# Patient Record
Sex: FEMALE | Race: WHITE | NOT HISPANIC OR LATINO | Employment: OTHER | URBAN - METROPOLITAN AREA
[De-identification: names, ages, dates, MRNs, and addresses within clinical notes are randomized per-mention and may not be internally consistent; named-entity substitution may affect disease eponyms.]

---

## 2017-03-29 ENCOUNTER — ALLSCRIPTS OFFICE VISIT (OUTPATIENT)
Dept: OTHER | Facility: OTHER | Age: 72
End: 2017-03-29

## 2017-09-19 ENCOUNTER — ALLSCRIPTS OFFICE VISIT (OUTPATIENT)
Dept: OTHER | Facility: OTHER | Age: 72
End: 2017-09-19

## 2018-01-14 VITALS
HEIGHT: 58 IN | DIASTOLIC BLOOD PRESSURE: 68 MMHG | WEIGHT: 161 LBS | BODY MASS INDEX: 33.8 KG/M2 | SYSTOLIC BLOOD PRESSURE: 138 MMHG

## 2018-01-14 VITALS
WEIGHT: 160 LBS | BODY MASS INDEX: 33.58 KG/M2 | SYSTOLIC BLOOD PRESSURE: 192 MMHG | HEIGHT: 58 IN | DIASTOLIC BLOOD PRESSURE: 80 MMHG | HEART RATE: 60 BPM

## 2018-09-19 ENCOUNTER — OFFICE VISIT (OUTPATIENT)
Dept: NEUROLOGY | Facility: CLINIC | Age: 73
End: 2018-09-19
Payer: COMMERCIAL

## 2018-09-19 VITALS
HEART RATE: 60 BPM | HEIGHT: 58 IN | BODY MASS INDEX: 32.32 KG/M2 | DIASTOLIC BLOOD PRESSURE: 78 MMHG | SYSTOLIC BLOOD PRESSURE: 170 MMHG | WEIGHT: 154 LBS

## 2018-09-19 DIAGNOSIS — Z86.73 HISTORY OF CVA (CEREBROVASCULAR ACCIDENT): ICD-10-CM

## 2018-09-19 DIAGNOSIS — M48.061 SPINAL STENOSIS OF LUMBAR REGION WITHOUT NEUROGENIC CLAUDICATION: Primary | ICD-10-CM

## 2018-09-19 PROBLEM — M48.00 SPINAL STENOSIS: Status: ACTIVE | Noted: 2018-09-19

## 2018-09-19 PROCEDURE — 99214 OFFICE O/P EST MOD 30 MIN: CPT | Performed by: PSYCHIATRY & NEUROLOGY

## 2018-09-19 RX ORDER — LOSARTAN POTASSIUM 50 MG/1
1 TABLET ORAL DAILY
COMMUNITY
Start: 2018-08-20 | End: 2020-05-12

## 2018-09-19 RX ORDER — FERROUS SULFATE 325(65) MG
325 TABLET ORAL DAILY
COMMUNITY
End: 2020-01-22

## 2018-09-19 RX ORDER — AMLODIPINE BESYLATE 5 MG/1
5 TABLET ORAL 2 TIMES DAILY
COMMUNITY
Start: 2018-09-06 | End: 2019-10-10 | Stop reason: SDUPTHER

## 2018-09-19 RX ORDER — BIOTIN 1 MG
1 TABLET ORAL EVERY OTHER DAY
COMMUNITY
End: 2020-02-07

## 2018-09-19 RX ORDER — CLOPIDOGREL BISULFATE 75 MG/1
1 TABLET ORAL DAILY
COMMUNITY
End: 2019-06-10 | Stop reason: SDUPTHER

## 2018-09-19 RX ORDER — TRAMADOL HYDROCHLORIDE 50 MG/1
50 TABLET ORAL 2 TIMES DAILY PRN
Qty: 60 TABLET | Refills: 1 | Status: SHIPPED | OUTPATIENT
Start: 2018-09-19 | End: 2019-01-30 | Stop reason: ALTCHOICE

## 2018-09-19 RX ORDER — TRAMADOL HYDROCHLORIDE 50 MG/1
1 TABLET ORAL 2 TIMES DAILY PRN
COMMUNITY
End: 2018-09-19 | Stop reason: SDUPTHER

## 2018-09-19 RX ORDER — UREA 10 %
800 LOTION (ML) TOPICAL DAILY
COMMUNITY
End: 2020-01-22

## 2018-09-19 RX ORDER — PSEUDOEPHEDRINE/ACETAMINOPHEN 30MG-500MG
TABLET ORAL 2 TIMES DAILY
COMMUNITY
End: 2020-01-22

## 2018-09-19 RX ORDER — LABETALOL 300 MG/1
600 TABLET, FILM COATED ORAL 3 TIMES DAILY
COMMUNITY
Start: 2018-05-07 | End: 2019-01-22

## 2018-09-19 NOTE — PROGRESS NOTES
Progress Note - Neurology   Emilee Camacho 72 y o  female MRN: [id number]  Unit/Bed#:  Encounter: [id number]      Subjective:   Patient is here for a follow-up visit with a history of CVA, hypertension, hyperlipidemia, and chronic low back pain secondary to lumbar spinal stenosis  She was last seen by us over 6 months ago, and since her last visit she had 1 episode of severe pain in both lower extremities with swelling in both lower extremities with gradually resolved after she discontinued the use of amlodipine  Patient denies any low back pain at this time, and uses tramadol only on a p r n  basis last refill 6 months ago  Patient is looking for another primary physician since she was restarted back by her primary physician on Norvasc and is extremely uncomfortable  Her blood pressure remains uncontrolled at this time  She denies any new neurological symptoms  ROS:   Review of Systems   Constitutional: Negative for appetite change and fever  HENT: Negative  Negative for hearing loss, tinnitus, trouble swallowing and voice change  Eyes: Negative  Negative for photophobia and pain  Respiratory: Positive for shortness of breath  Cardiovascular: Negative  Negative for palpitations  Gastrointestinal: Negative  Negative for nausea and vomiting  Endocrine: Negative  Negative for cold intolerance and heat intolerance  Genitourinary: Positive for enuresis  Negative for dysuria, frequency and urgency  Musculoskeletal: Positive for gait problem  Negative for back pain, myalgias and neck pain  Skin: Negative  Negative for rash  Neurological: Positive for speech difficulty, weakness and headaches  Negative for dizziness, tremors, seizures, syncope, facial asymmetry, light-headedness and numbness  Hematological: Negative  Does not bruise/bleed easily  Psychiatric/Behavioral: Negative  Negative for confusion, decreased concentration, hallucinations and sleep disturbance         Vitals: Vitals:    09/19/18 1122   BP: 170/78   Pulse: 60   ,Body mass index is 32 75 kg/m²  MEDS:      Current Outpatient Prescriptions:     amLODIPine (NORVASC) 5 mg tablet, Take 5 mg by mouth 2 (two) times a day, Disp: , Rfl:     Cholecalciferol (VITAMIN D3) 2000 units capsule, Take 1 capsule by mouth every morning, Disp: , Rfl:     clopidogrel (PLAVIX) 75 mg tablet, Take 1 tablet by mouth daily, Disp: , Rfl:     ferrous sulfate 325 (65 Fe) mg tablet, Take 325 mg by mouth daily, Disp: , Rfl:     folic acid (FOLVITE) 1 mg tablet, Take 1 mg by mouth daily, Disp: , Rfl:     labetalol (NORMODYNE) 300 mg tablet, Take 600 mg by mouth 3 (three) times a day, Disp: , Rfl:     losartan (COZAAR) 50 mg tablet, Take 1 tablet by mouth 2 (two) times a day, Disp: , Rfl:     metFORMIN (GLUCOPHAGE) 500 mg tablet, Take 1 tablet by mouth every 12 (twelve) hours, Disp: , Rfl:     Omega-3 Fatty Acids (SM FISH OIL) 1200 MG CAPS, Take by mouth 2 (two) times a day, Disp: , Rfl:     traMADol (ULTRAM) 50 mg tablet, Take 1 tablet (50 mg total) by mouth 2 (two) times a day as needed for moderate pain, Disp: 60 tablet, Rfl: 1  :    Physical Exam:  General appearance: alert, appears stated age and cooperative  Head: Normocephalic, without obvious abnormality, atraumatic    Neurologic:  On examination she has no evidence of any new cranial nerve, motor or sensory deficits in the upper lower extremities, there is no evidence of any calf tenderness or swelling in the lower extremities at this time, no bruits were appreciable in the neck, and her gait is normal based  Patient has no evidence of any lumbosacral tenderness  Lab Results: I have personally reviewed pertinent reports  Imaging Studies: I have personally reviewed pertinent reports  Assessment:  1  Lumbar spinal stenosis,  2  History of CVA  3  Hypertension, hyperlipidemia and diabetes mellitus      Plan:  Patient is advised to continue Plavix 75 mg daily, she may continue to use tramadol on a p r n  basis, home exercise program is encouraged, and will also refer her to Internal Medicine for further evaluation and treatment of her hypertension at the patient's request       9/19/2018,12:55 PM    Dictation voice to text software has been used in the creation of this document  Please consider this in light of any contextual or grammatical errors

## 2019-01-22 ENCOUNTER — OFFICE VISIT (OUTPATIENT)
Dept: INTERNAL MEDICINE CLINIC | Facility: CLINIC | Age: 74
End: 2019-01-22
Payer: COMMERCIAL

## 2019-01-22 VITALS
HEIGHT: 58 IN | TEMPERATURE: 98 F | OXYGEN SATURATION: 98 % | RESPIRATION RATE: 18 BRPM | HEART RATE: 98 BPM | DIASTOLIC BLOOD PRESSURE: 82 MMHG | WEIGHT: 147.8 LBS | BODY MASS INDEX: 31.02 KG/M2 | SYSTOLIC BLOOD PRESSURE: 180 MMHG

## 2019-01-22 DIAGNOSIS — Z78.9 STATIN INTOLERANCE: Primary | ICD-10-CM

## 2019-01-22 DIAGNOSIS — F17.200 TOBACCO USE DISORDER: ICD-10-CM

## 2019-01-22 DIAGNOSIS — G45.0 VERTEBROBASILAR ARTERY SYNDROME: ICD-10-CM

## 2019-01-22 DIAGNOSIS — M1A.3720 CHRONIC GOUT DUE TO RENAL IMPAIRMENT INVOLVING TOE OF LEFT FOOT WITHOUT TOPHUS: ICD-10-CM

## 2019-01-22 DIAGNOSIS — M79.605 PAIN OF LEFT LOWER EXTREMITY: ICD-10-CM

## 2019-01-22 DIAGNOSIS — I73.9 PERIPHERAL VASCULAR DISEASE (HCC): ICD-10-CM

## 2019-01-22 DIAGNOSIS — Z86.73 HISTORY OF CVA (CEREBROVASCULAR ACCIDENT): ICD-10-CM

## 2019-01-22 DIAGNOSIS — IMO0001 UNCONTROLLED TYPE 2 DIABETES MELLITUS WITHOUT COMPLICATION, WITHOUT LONG-TERM CURRENT USE OF INSULIN: ICD-10-CM

## 2019-01-22 DIAGNOSIS — E11.69 HYPERLIPIDEMIA ASSOCIATED WITH TYPE 2 DIABETES MELLITUS (HCC): ICD-10-CM

## 2019-01-22 DIAGNOSIS — I83.812 VARICOSE VEINS OF LEFT LOWER EXTREMITY WITH PAIN: ICD-10-CM

## 2019-01-22 DIAGNOSIS — I10 HYPERTENSION, UNSPECIFIED TYPE: ICD-10-CM

## 2019-01-22 DIAGNOSIS — M48.061 SPINAL STENOSIS OF LUMBAR REGION WITHOUT NEUROGENIC CLAUDICATION: ICD-10-CM

## 2019-01-22 DIAGNOSIS — G62.9 POLYNEUROPATHY: ICD-10-CM

## 2019-01-22 DIAGNOSIS — M51.26 LUMBAR DISC HERNIATION: ICD-10-CM

## 2019-01-22 DIAGNOSIS — I70.1 RENAL ARTERY STENOSIS (HCC): ICD-10-CM

## 2019-01-22 DIAGNOSIS — E78.5 HYPERLIPIDEMIA ASSOCIATED WITH TYPE 2 DIABETES MELLITUS (HCC): ICD-10-CM

## 2019-01-22 DIAGNOSIS — E55.9 VITAMIN D DEFICIENCY: ICD-10-CM

## 2019-01-22 DIAGNOSIS — I65.1 BASILAR ARTERY STENOSIS: ICD-10-CM

## 2019-01-22 DIAGNOSIS — R94.31 ABNORMAL EKG: ICD-10-CM

## 2019-01-22 PROBLEM — N63.0 BREAST MASS: Status: ACTIVE | Noted: 2017-12-04

## 2019-01-22 PROBLEM — F41.8 DEPRESSION WITH ANXIETY: Status: ACTIVE | Noted: 2017-12-04

## 2019-01-22 PROBLEM — N18.30 CKD (CHRONIC KIDNEY DISEASE), STAGE III (HCC): Status: ACTIVE | Noted: 2017-12-04

## 2019-01-22 PROBLEM — E11.59 HYPERTENSION ASSOCIATED WITH DIABETES (HCC): Status: ACTIVE | Noted: 2017-12-04

## 2019-01-22 PROBLEM — K21.9 CHRONIC GERD: Status: ACTIVE | Noted: 2017-12-04

## 2019-01-22 PROBLEM — M19.90 OSTEOARTHRITIS: Status: ACTIVE | Noted: 2017-12-04

## 2019-01-22 PROBLEM — N80.9 ENDOMETRIOSIS: Status: ACTIVE | Noted: 2017-03-29

## 2019-01-22 PROBLEM — M10.9 GOUT: Status: ACTIVE | Noted: 2017-12-04

## 2019-01-22 PROBLEM — I15.2 HYPERTENSION ASSOCIATED WITH DIABETES (HCC): Status: ACTIVE | Noted: 2017-12-04

## 2019-01-22 PROBLEM — E66.9 OBESITY (BMI 30-39.9): Status: ACTIVE | Noted: 2017-12-04

## 2019-01-22 PROCEDURE — 93000 ELECTROCARDIOGRAM COMPLETE: CPT | Performed by: INTERNAL MEDICINE

## 2019-01-22 PROCEDURE — 99205 OFFICE O/P NEW HI 60 MIN: CPT | Performed by: INTERNAL MEDICINE

## 2019-01-22 RX ORDER — LOSARTAN POTASSIUM 100 MG/1
100 TABLET ORAL EVERY MORNING
COMMUNITY
End: 2020-05-12

## 2019-01-22 RX ORDER — CHLORTHALIDONE 25 MG/1
25 TABLET ORAL DAILY
Qty: 90 TABLET | Refills: 2 | Status: SHIPPED | OUTPATIENT
Start: 2019-01-22 | End: 2019-04-02

## 2019-01-22 NOTE — PROGRESS NOTES
Assessment/Plan:    1  Abnormal EKG is asymptomatic question of old MI on EKG will get echocardiogram as well as refer to Cardiology  2  Patient with history of peripheral vascular disease will recheck vas studies  3 long history of tobacco abuse strongly recommend tobacco cessation to decrease her risk of stroke as well as other vascular problems including myocardial infarction and death not interested at this time  4  Hypertension is not controlled will add chlorthalidone 25 mg once a day check labs in 1 week  5  Hyperlipidemia is statin intolerant   6  History of peripheral vascular disease will get arterial studies is having left lower extremity pain  7  Chronic kidney disease continued follow-up with her nephrologist Dr Rossy Jessica  8  Type 2 diabetes will be getting A1c and laboratory work in 1 week will stop metformin secondary to chronic kidney disease   9  Chronic kidney disease to cyst avoid all nonsteroidal anti-inflammatories such as Motrin Aleve or ibuprofen           Diagnoses and all orders for this visit:    Statin intolerance  -     CBC and differential; Future  -     Comprehensive metabolic panel; Future  -     Hemoglobin A1C; Future  -     TSH, 3rd generation with Free T4 reflex; Future  -     Vitamin D 25 hydroxy; Future  -     Uric acid; Future  -     Vitamin B12; Future  -     Protein electrophoresis, serum; Future    History of CVA (cerebrovascular accident)  -     Ambulatory referral to Internal Medicine  -     POCT ECG; Future  -     CBC and differential; Future  -     Comprehensive metabolic panel; Future  -     Hemoglobin A1C; Future  -     TSH, 3rd generation with Free T4 reflex; Future  -     Vitamin D 25 hydroxy; Future  -     Uric acid; Future  -     Vitamin B12; Future  -     Protein electrophoresis, serum; Future    Vitamin D deficiency  -     CBC and differential; Future  -     Comprehensive metabolic panel;  Future  -     Hemoglobin A1C; Future  -     TSH, 3rd generation with Free T4 reflex; Future  -     Vitamin D 25 hydroxy; Future  -     Uric acid; Future  -     Vitamin B12; Future  -     Protein electrophoresis, serum; Future    Vertebrobasilar artery syndrome  -     CBC and differential; Future  -     Comprehensive metabolic panel; Future  -     Hemoglobin A1C; Future  -     TSH, 3rd generation with Free T4 reflex; Future  -     Vitamin D 25 hydroxy; Future  -     Uric acid; Future  -     Vitamin B12; Future  -     Protein electrophoresis, serum; Future    Uncontrolled type 2 diabetes mellitus without complication, without long-term current use of insulin (HCC)  -     CBC and differential; Future  -     Comprehensive metabolic panel; Future  -     Hemoglobin A1C; Future  -     TSH, 3rd generation with Free T4 reflex; Future  -     Vitamin D 25 hydroxy; Future  -     Uric acid; Future  -     Vitamin B12; Future  -     Protein electrophoresis, serum; Future    Tobacco use disorder  -     CBC and differential; Future  -     Comprehensive metabolic panel; Future  -     Hemoglobin A1C; Future  -     TSH, 3rd generation with Free T4 reflex; Future  -     Vitamin D 25 hydroxy; Future  -     Uric acid; Future  -     Vitamin B12; Future  -     Protein electrophoresis, serum; Future  -     CT lung screening program; Future    Renal artery stenosis (HCC)  -     CBC and differential; Future  -     Comprehensive metabolic panel; Future  -     Hemoglobin A1C; Future  -     TSH, 3rd generation with Free T4 reflex; Future  -     Vitamin D 25 hydroxy; Future  -     Uric acid; Future  -     Vitamin B12; Future  -     Protein electrophoresis, serum; Future    Polyneuropathy  -     CBC and differential; Future  -     Comprehensive metabolic panel; Future  -     Hemoglobin A1C; Future  -     TSH, 3rd generation with Free T4 reflex; Future  -     Vitamin D 25 hydroxy; Future  -     Uric acid; Future  -     Vitamin B12; Future  -     Protein electrophoresis, serum;  Future    Hyperlipidemia associated with type 2 diabetes mellitus (HCC)  -     CBC and differential; Future  -     Comprehensive metabolic panel; Future  -     Hemoglobin A1C; Future  -     TSH, 3rd generation with Free T4 reflex; Future  -     Vitamin D 25 hydroxy; Future  -     Uric acid; Future  -     Vitamin B12; Future  -     Protein electrophoresis, serum; Future    Hypertension, unspecified type  -     POCT ECG; Future  -     CBC and differential; Future  -     Comprehensive metabolic panel; Future  -     Hemoglobin A1C; Future  -     TSH, 3rd generation with Free T4 reflex; Future  -     Vitamin D 25 hydroxy; Future  -     Uric acid; Future  -     Vitamin B12; Future  -     Protein electrophoresis, serum; Future  -     chlorthalidone 25 mg tablet; Take 1 tablet (25 mg total) by mouth daily    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  -     CBC and differential; Future  -     Comprehensive metabolic panel; Future  -     Hemoglobin A1C; Future  -     TSH, 3rd generation with Free T4 reflex; Future  -     Vitamin D 25 hydroxy; Future  -     Uric acid; Future  -     Vitamin B12; Future  -     Protein electrophoresis, serum; Future    Basilar artery stenosis  -     CBC and differential; Future  -     Comprehensive metabolic panel; Future  -     Hemoglobin A1C; Future  -     TSH, 3rd generation with Free T4 reflex; Future  -     Vitamin D 25 hydroxy; Future  -     Uric acid; Future  -     Vitamin B12; Future  -     Protein electrophoresis, serum; Future    Spinal stenosis of lumbar region without neurogenic claudication  -     CBC and differential; Future  -     Comprehensive metabolic panel; Future  -     Hemoglobin A1C; Future  -     TSH, 3rd generation with Free T4 reflex; Future  -     Vitamin D 25 hydroxy; Future  -     Uric acid; Future  -     Vitamin B12; Future  -     Protein electrophoresis, serum; Future  -     Ambulatory referral to Physical Therapy;  Future  -     XR spine lumbar minimum 4 views non injury; Future    Varicose veins of left lower extremity with pain  -     CBC and differential; Future  -     Comprehensive metabolic panel; Future  -     Hemoglobin A1C; Future  -     TSH, 3rd generation with Free T4 reflex; Future  -     Vitamin D 25 hydroxy; Future  -     Uric acid; Future  -     Vitamin B12; Future  -     Protein electrophoresis, serum; Future    Pain of left lower extremity  -     CBC and differential; Future  -     Comprehensive metabolic panel; Future  -     Hemoglobin A1C; Future  -     TSH, 3rd generation with Free T4 reflex; Future  -     Vitamin D 25 hydroxy; Future  -     Uric acid; Future  -     Vitamin B12; Future  -     Protein electrophoresis, serum; Future  -     Ambulatory referral to Physical Therapy; Future  -     XR femur 1 vw left; Future  -     XR spine lumbar minimum 4 views non injury; Future    Lumbar disc herniation  -     CBC and differential; Future  -     Comprehensive metabolic panel; Future  -     Hemoglobin A1C; Future  -     TSH, 3rd generation with Free T4 reflex; Future  -     Vitamin D 25 hydroxy; Future  -     Uric acid; Future  -     Vitamin B12; Future  -     Protein electrophoresis, serum; Future  -     Ambulatory referral to Physical Therapy; Future  -     XR spine lumbar minimum 4 views non injury; Future    Abnormal EKG    Other orders  -     losartan (COZAAR) 100 MG tablet; Take 100 mg by mouth daily  -     Cancel: XR femur 1 vw left; Future        The patient was counseled regarding instructions for management, risk factor reductions, patient and family education,impressions, risks and benefits of treatment options, side effects of medications, importance of compliance with treatment  The treatment plan was reviewed with the patient/guardian and patient/guardian understands and agrees with the treatment plan              Current Outpatient Prescriptions:     amLODIPine (NORVASC) 5 mg tablet, Take 5 mg by mouth 2 (two) times a day, Disp: , Rfl:    Cholecalciferol (VITAMIN D3) 2000 units capsule, Take 1 capsule by mouth every other day  , Disp: , Rfl:     clopidogrel (PLAVIX) 75 mg tablet, Take 1 tablet by mouth daily, Disp: , Rfl:     ferrous sulfate 325 (65 Fe) mg tablet, Take 325 mg by mouth daily, Disp: , Rfl:     folic acid (FOLVITE) 1 mg tablet, Take 1 mg by mouth daily, Disp: , Rfl:     losartan (COZAAR) 100 MG tablet, Take 100 mg by mouth daily, Disp: , Rfl:     losartan (COZAAR) 50 mg tablet, Take 1 tablet by mouth daily  , Disp: , Rfl:     metFORMIN (GLUCOPHAGE) 500 mg tablet, Take 1 tablet by mouth every 12 (twelve) hours, Disp: , Rfl:     Omega-3 Fatty Acids (SM FISH OIL) 1200 MG CAPS, Take by mouth 2 (two) times a day, Disp: , Rfl:     traMADol (ULTRAM) 50 mg tablet, Take 1 tablet (50 mg total) by mouth 2 (two) times a day as needed for moderate pain, Disp: 60 tablet, Rfl: 1    chlorthalidone 25 mg tablet, Take 1 tablet (25 mg total) by mouth daily, Disp: 90 tablet, Rfl: 2    Subjective:      Patient ID: Debbie Aguayo is a 72 y o  female  Left leg pain fell 2 weeks ago pain left leg has been having a difficult time controlling her blood pressure stop the labetalol about a month ago did not have any symptoms        The following portions of the patient's history were reviewed and updated as appropriate:   She has a past medical history of Arthritis; Coronary artery disease; Diabetes mellitus ([address] ); Endometriosis; High cholesterol; Hypertension; Kidney disease, chronic, stage III (moderate, EGFR 30-59 ml/min) ([address] ); Lumbar disc herniation; Peripheral vascular disease ([address] ); Shoulder injury; Spinal stenosis; and Stroke ([address] )  ,   does not have any pertinent problems on file  ,   has a past surgical history that includes Rotator cuff repair (Left) and Tonsillectomy  ,  family history includes Arthritis in her brother; Heart defect in her father; Heart disease in her mother; Hyperlipidemia in her mother; Hypertension in her father and mother; Other in her brother; Stroke in her sister  ,   reports that she has been smoking Cigarettes  She has been smoking about 1 00 pack per day  She has never used smokeless tobacco  She reports that she does not drink alcohol or use drugs  ,  is allergic to atorvastatin; colesevelam; colestipol; ezetimibe; fenofibrate; pollen extract; rosuvastatin; and statins       Review of Systems   Constitutional: Negative for appetite change, chills, fatigue, fever and unexpected weight change  HENT: Negative for congestion, ear pain, facial swelling, hearing loss, mouth sores, nosebleeds, postnasal drip, rhinorrhea, sinus pain, sore throat, trouble swallowing and voice change  Eyes: Negative for pain, discharge, redness and visual disturbance  Respiratory: Negative for apnea, chest tightness, shortness of breath, wheezing and stridor  Cardiovascular: Negative for chest pain, palpitations and leg swelling  Gastrointestinal: Negative for abdominal distention, abdominal pain, blood in stool, constipation, diarrhea and vomiting  Endocrine: Negative for cold intolerance, heat intolerance, polydipsia, polyphagia and polyuria  Genitourinary: Negative for difficulty urinating, dysuria, flank pain, frequency, genital sores, hematuria and urgency  Musculoskeletal: Positive for arthralgias, back pain and gait problem  Skin: Negative for rash and wound  Allergic/Immunologic: Negative for environmental allergies, food allergies and immunocompromised state  Neurological: Negative for dizziness, tremors, seizures, syncope, facial asymmetry, speech difficulty, weakness, light-headedness, numbness and headaches  Hematological: Negative for adenopathy  Does not bruise/bleed easily  Psychiatric/Behavioral: Negative for agitation, behavioral problems, dysphoric mood, hallucinations, self-injury, sleep disturbance and suicidal ideas  The patient is not hyperactive            Objective:  BP (!) 180/82 (BP Location: Left arm, Patient Position: Sitting)   Pulse 98   Temp 98 °F (36 7 °C) (Tympanic)   Resp 18   Ht 4' 10" (1 473 m)   Wt 67 kg (147 lb 12 8 oz)   LMP  (LMP Unknown)   SpO2 98%   BMI 30 89 kg/m²     Lab Review  No visits with results within 6 Month(s) from this visit  Latest known visit with results is:   No results found for any previous visit  15 1 14 0   42 8 42 5   Hemoglobin   Hematocrit    CHEMISTRY ROUTINE  Component Name    Orders Only on 12/3/2018  [facility]")' href="[email or web address]  [id number] 13 401 2 7 8 [id number]/">12/3/2018   Orders Only on 9/4/2018  [facility]")' href="[email or web address]  [id number] 1 07 [phone] 4 4 2 [id number]/">0/01/8217   Orders Only on 4/11/2018  [facility]")' href="[email or web address]  2 [id number] 1 13 401 2 7 8 [id number]/">11/16/2017       12 7     14 11     5 6 5 7       56 4   23 21     1 64 1 55     31 33     99 108     4 5 4 6     10  5       101       23       141       7 7       286 294   194 185   43 45   244 321   49       Lower Extremity Arterial Doppler Segmental Pressure  Examination           Indication: Claudication          High resolution bilateral lower extremity arterial Doppler wave forms and          pressures were obtained and evaluated  Upper extremity arterial pressures          were obtained for calculation of the ankle brachial index calculation (LISA)          The study was technically adequate  No prior study available for comparison           Right Leg: Biphasic common femoral, and monophasic femoral, popliteal, and          tibial signals were noted  The thigh pressure was 200 mmHg, the calf          pressure 150 mmHg, and the highest ankle brachial index was 0 74 at the          posterior tibial (140 mm Hg)  The dorsalis pedis index was 0 68 (130)          Left Leg: Monophasic signals were noted throughout the left lower extremity            The thigh pressure was 122 mm Hg, the calf pressure 122 mm Hg, the posterior          tibial artery 110 mm Hg with an index of 0 58, and dorsalis pedis 122 Mm Hg          with an index is 0 64           IMPRESSION:          1  Moderate right lower extremity arterial compromise, with likely          femoral-popliteal artery occlusive disease          2  Moderate left lower extremity arterial compromise, with likely iliofemoral          artery occlusive disease          The moderate degree of occlusive disease described above is consistent with          vasculogenic claudication  Clinical correlation is recommended    Imaging: No results found  XR femur 1 vw left    (Results Pending)   XR spine lumbar minimum 4 views non injury    (Results Pending)   CT lung screening program    (Results Pending)     No results found for this or any previous visit  Physical Exam   Constitutional: She is oriented to person, place, and time  She appears well-developed  Over weight   HENT:   Right Ear: External ear normal    Left Ear: External ear normal    Eyes: Right eye exhibits no discharge  Left eye exhibits no discharge  No scleral icterus  Neck: Carotid bruit is not present  No tracheal deviation present  No thyroid mass and no thyromegaly present  Cardiovascular: Normal rate, regular rhythm, normal heart sounds and intact distal pulses  Exam reveals no gallop and no friction rub  No murmur heard  Pulmonary/Chest: No respiratory distress  She has no wheezes  She has no rales  Musculoskeletal: She exhibits no edema  Lymphadenopathy:     She has no cervical adenopathy  Neurological: She is alert and oriented to person, place, and time  Coordination normal    Psychiatric: She has a normal mood and affect  Her behavior is normal  Judgment and thought content normal    Nursing note and vitals reviewed

## 2019-01-22 NOTE — LETTER
January 22, 2019     Wai Evans MD  [place] [address]    Patient: Laurel Givens   YOB: 1945   Date of Visit: 1/22/2019       Dear Dr Jeremy Otto:    Thank you for referring Yesenia Mills to me for evaluation  Below are my notes for this consultation  If you have questions, please do not hesitate to call me  I look forward to following your patient along with you  Sincerely,        Syl Pettit, DO        CC: MD Syl Ellis, DO  1/22/2019 10:39 AM  Sign at close encounter  Assessment/Plan:    1  Abnormal EKG is asymptomatic question of old MI on EKG will get echocardiogram as well as refer to Cardiology  2  Patient with history of peripheral vascular disease will recheck vas studies  3 long history of tobacco abuse strongly recommend tobacco cessation to decrease her risk of stroke as well as other vascular problems including myocardial infarction and death not interested at this time  4  Hypertension is not controlled will add chlorthalidone 25 mg once a day check labs in 1 week  5  Hyperlipidemia is statin intolerant   6  History of peripheral vascular disease will get arterial studies is having left lower extremity pain  7  Chronic kidney disease continued follow-up with her nephrologist Dr Zoila Kline  8  Type 2 diabetes will be getting A1c and laboratory work in 1 week will stop metformin secondary to chronic kidney disease   9  Chronic kidney disease to cyst avoid all nonsteroidal anti-inflammatories such as Motrin Aleve or ibuprofen           Diagnoses and all orders for this visit:    Statin intolerance  -     CBC and differential; Future  -     Comprehensive metabolic panel; Future  -     Hemoglobin A1C; Future  -     TSH, 3rd generation with Free T4 reflex; Future  -     Vitamin D 25 hydroxy; Future  -     Uric acid; Future  -     Vitamin B12; Future  -     Protein electrophoresis, serum;  Future    History of CVA (cerebrovascular accident)  -     Ambulatory referral to Internal Medicine  -     POCT ECG; Future  -     CBC and differential; Future  -     Comprehensive metabolic panel; Future  -     Hemoglobin A1C; Future  -     TSH, 3rd generation with Free T4 reflex; Future  -     Vitamin D 25 hydroxy; Future  -     Uric acid; Future  -     Vitamin B12; Future  -     Protein electrophoresis, serum; Future    Vitamin D deficiency  -     CBC and differential; Future  -     Comprehensive metabolic panel; Future  -     Hemoglobin A1C; Future  -     TSH, 3rd generation with Free T4 reflex; Future  -     Vitamin D 25 hydroxy; Future  -     Uric acid; Future  -     Vitamin B12; Future  -     Protein electrophoresis, serum; Future    Vertebrobasilar artery syndrome  -     CBC and differential; Future  -     Comprehensive metabolic panel; Future  -     Hemoglobin A1C; Future  -     TSH, 3rd generation with Free T4 reflex; Future  -     Vitamin D 25 hydroxy; Future  -     Uric acid; Future  -     Vitamin B12; Future  -     Protein electrophoresis, serum; Future    Uncontrolled type 2 diabetes mellitus without complication, without long-term current use of insulin (HCC)  -     CBC and differential; Future  -     Comprehensive metabolic panel; Future  -     Hemoglobin A1C; Future  -     TSH, 3rd generation with Free T4 reflex; Future  -     Vitamin D 25 hydroxy; Future  -     Uric acid; Future  -     Vitamin B12; Future  -     Protein electrophoresis, serum; Future    Tobacco use disorder  -     CBC and differential; Future  -     Comprehensive metabolic panel; Future  -     Hemoglobin A1C; Future  -     TSH, 3rd generation with Free T4 reflex; Future  -     Vitamin D 25 hydroxy; Future  -     Uric acid; Future  -     Vitamin B12; Future  -     Protein electrophoresis, serum; Future  -     CT lung screening program; Future    Renal artery stenosis (HCC)  -     CBC and differential; Future  -     Comprehensive metabolic panel;  Future  -     Hemoglobin A1C; Future  -     TSH, 3rd generation with Free T4 reflex; Future  -     Vitamin D 25 hydroxy; Future  -     Uric acid; Future  -     Vitamin B12; Future  -     Protein electrophoresis, serum; Future    Polyneuropathy  -     CBC and differential; Future  -     Comprehensive metabolic panel; Future  -     Hemoglobin A1C; Future  -     TSH, 3rd generation with Free T4 reflex; Future  -     Vitamin D 25 hydroxy; Future  -     Uric acid; Future  -     Vitamin B12; Future  -     Protein electrophoresis, serum; Future    Hyperlipidemia associated with type 2 diabetes mellitus (HCC)  -     CBC and differential; Future  -     Comprehensive metabolic panel; Future  -     Hemoglobin A1C; Future  -     TSH, 3rd generation with Free T4 reflex; Future  -     Vitamin D 25 hydroxy; Future  -     Uric acid; Future  -     Vitamin B12; Future  -     Protein electrophoresis, serum; Future    Hypertension, unspecified type  -     POCT ECG; Future  -     CBC and differential; Future  -     Comprehensive metabolic panel; Future  -     Hemoglobin A1C; Future  -     TSH, 3rd generation with Free T4 reflex; Future  -     Vitamin D 25 hydroxy; Future  -     Uric acid; Future  -     Vitamin B12; Future  -     Protein electrophoresis, serum; Future  -     chlorthalidone 25 mg tablet; Take 1 tablet (25 mg total) by mouth daily    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  -     CBC and differential; Future  -     Comprehensive metabolic panel; Future  -     Hemoglobin A1C; Future  -     TSH, 3rd generation with Free T4 reflex; Future  -     Vitamin D 25 hydroxy; Future  -     Uric acid; Future  -     Vitamin B12; Future  -     Protein electrophoresis, serum; Future    Basilar artery stenosis  -     CBC and differential; Future  -     Comprehensive metabolic panel; Future  -     Hemoglobin A1C; Future  -     TSH, 3rd generation with Free T4 reflex; Future  -     Vitamin D 25 hydroxy;  Future  -     Uric acid; Future  -     Vitamin B12; Future  -     Protein electrophoresis, serum; Future    Spinal stenosis of lumbar region without neurogenic claudication  -     CBC and differential; Future  -     Comprehensive metabolic panel; Future  -     Hemoglobin A1C; Future  -     TSH, 3rd generation with Free T4 reflex; Future  -     Vitamin D 25 hydroxy; Future  -     Uric acid; Future  -     Vitamin B12; Future  -     Protein electrophoresis, serum; Future  -     Ambulatory referral to Physical Therapy; Future  -     XR spine lumbar minimum 4 views non injury; Future    Varicose veins of left lower extremity with pain  -     CBC and differential; Future  -     Comprehensive metabolic panel; Future  -     Hemoglobin A1C; Future  -     TSH, 3rd generation with Free T4 reflex; Future  -     Vitamin D 25 hydroxy; Future  -     Uric acid; Future  -     Vitamin B12; Future  -     Protein electrophoresis, serum; Future    Pain of left lower extremity  -     CBC and differential; Future  -     Comprehensive metabolic panel; Future  -     Hemoglobin A1C; Future  -     TSH, 3rd generation with Free T4 reflex; Future  -     Vitamin D 25 hydroxy; Future  -     Uric acid; Future  -     Vitamin B12; Future  -     Protein electrophoresis, serum; Future  -     Ambulatory referral to Physical Therapy; Future  -     XR femur 1 vw left; Future  -     XR spine lumbar minimum 4 views non injury; Future    Lumbar disc herniation  -     CBC and differential; Future  -     Comprehensive metabolic panel; Future  -     Hemoglobin A1C; Future  -     TSH, 3rd generation with Free T4 reflex; Future  -     Vitamin D 25 hydroxy; Future  -     Uric acid; Future  -     Vitamin B12; Future  -     Protein electrophoresis, serum; Future  -     Ambulatory referral to Physical Therapy; Future  -     XR spine lumbar minimum 4 views non injury; Future    Abnormal EKG    Other orders  -     losartan (COZAAR) 100 MG tablet;  Take 100 mg by mouth daily  -     Cancel: XR femur 1 vw left; Future        The patient was counseled regarding instructions for management, risk factor reductions, patient and family education,impressions, risks and benefits of treatment options, side effects of medications, importance of compliance with treatment  The treatment plan was reviewed with the patient/guardian and patient/guardian understands and agrees with the treatment plan  Current Outpatient Prescriptions:     amLODIPine (NORVASC) 5 mg tablet, Take 5 mg by mouth 2 (two) times a day, Disp: , Rfl:     Cholecalciferol (VITAMIN D3) 2000 units capsule, Take 1 capsule by mouth every other day  , Disp: , Rfl:     clopidogrel (PLAVIX) 75 mg tablet, Take 1 tablet by mouth daily, Disp: , Rfl:     ferrous sulfate 325 (65 Fe) mg tablet, Take 325 mg by mouth daily, Disp: , Rfl:     folic acid (FOLVITE) 1 mg tablet, Take 1 mg by mouth daily, Disp: , Rfl:     losartan (COZAAR) 100 MG tablet, Take 100 mg by mouth daily, Disp: , Rfl:     losartan (COZAAR) 50 mg tablet, Take 1 tablet by mouth daily  , Disp: , Rfl:     metFORMIN (GLUCOPHAGE) 500 mg tablet, Take 1 tablet by mouth every 12 (twelve) hours, Disp: , Rfl:     Omega-3 Fatty Acids (SM FISH OIL) 1200 MG CAPS, Take by mouth 2 (two) times a day, Disp: , Rfl:     traMADol (ULTRAM) 50 mg tablet, Take 1 tablet (50 mg total) by mouth 2 (two) times a day as needed for moderate pain, Disp: 60 tablet, Rfl: 1    chlorthalidone 25 mg tablet, Take 1 tablet (25 mg total) by mouth daily, Disp: 90 tablet, Rfl: 2    Subjective:      Patient ID: Gregg Schmitz is a 72 y o  female      Left leg pain fell 2 weeks ago pain left leg has been having a difficult time controlling her blood pressure stop the labetalol about a month ago did not have any symptoms        The following portions of the patient's history were reviewed and updated as appropriate:   She has a past medical history of Arthritis; Coronary artery disease; Diabetes mellitus ([address] ); Endometriosis; High cholesterol; Hypertension; Kidney disease, chronic, stage III (moderate, EGFR 30-59 ml/min) ([facility] ); Lumbar disc herniation; Peripheral vascular disease ([facility] ); Shoulder injury; Spinal stenosis; and Stroke ([facility] )  ,   does not have any pertinent problems on file  ,   has a past surgical history that includes Rotator cuff repair (Left) and Tonsillectomy  ,  family history includes Arthritis in her brother; Heart defect in her father; Heart disease in her mother; Hyperlipidemia in her mother; Hypertension in her father and mother; Other in her brother; Stroke in her sister  ,   reports that she has been smoking Cigarettes  She has been smoking about 1 00 pack per day  She has never used smokeless tobacco  She reports that she does not drink alcohol or use drugs  ,  is allergic to atorvastatin; colesevelam; colestipol; ezetimibe; fenofibrate; pollen extract; rosuvastatin; and statins       Review of Systems   Constitutional: Negative for appetite change, chills, fatigue, fever and unexpected weight change  HENT: Negative for congestion, ear pain, facial swelling, hearing loss, mouth sores, nosebleeds, postnasal drip, rhinorrhea, sinus pain, sore throat, trouble swallowing and voice change  Eyes: Negative for pain, discharge, redness and visual disturbance  Respiratory: Negative for apnea, chest tightness, shortness of breath, wheezing and stridor  Cardiovascular: Negative for chest pain, palpitations and leg swelling  Gastrointestinal: Negative for abdominal distention, abdominal pain, blood in stool, constipation, diarrhea and vomiting  Endocrine: Negative for cold intolerance, heat intolerance, polydipsia, polyphagia and polyuria  Genitourinary: Negative for difficulty urinating, dysuria, flank pain, frequency, genital sores, hematuria and urgency  Musculoskeletal: Positive for arthralgias, back pain and gait problem  Skin: Negative for rash and wound     Allergic/Immunologic: Negative for environmental allergies, food allergies and immunocompromised state  Neurological: Negative for dizziness, tremors, seizures, syncope, facial asymmetry, speech difficulty, weakness, light-headedness, numbness and headaches  Hematological: Negative for adenopathy  Does not bruise/bleed easily  Psychiatric/Behavioral: Negative for agitation, behavioral problems, dysphoric mood, hallucinations, self-injury, sleep disturbance and suicidal ideas  The patient is not hyperactive  Objective:  BP (!) 180/82 (BP Location: Left arm, Patient Position: Sitting)   Pulse 98   Temp 98 °F (36 7 °C) (Tympanic)   Resp 18   Ht 4' 10" (1 473 m)   Wt 67 kg (147 lb 12 8 oz)   LMP  (LMP Unknown)   SpO2 98%   BMI 30 89 kg/m²      Lab Review  No visits with results within 6 Month(s) from this visit  Latest known visit with results is:   No results found for any previous visit  15 1 14 0   42 8 42 5   Hemoglobin   Hematocrit    CHEMISTRY ROUTINE  Component Name    Orders Only on 12/3/2018  [address] [place] [address]")' href="[email or web address]  [id number] 1 13 401 2 7 8 [id number]/">12/3/2018   Orders Only on 9/4/2018  [address] [place] [address]")' href="[email or web address]  [id number] [phone] 4 4 2 [id number]/">2/64/5549   Orders Only on 4/11/2018  [address] [place] [address]")' href="[email or web address]  2 [id number] 1 13 401 2 7 8 [id number]/">11/16/2017       12 7     14 11     5 6 5 7       56 4   23 21     1 64 1 55     31 33     99 108     4 5 4 6     10  5       101       23       141       7 7       286 294   194 185   43 45   244 321   49       Lower Extremity Arterial Doppler Segmental Pressure  Examination           Indication: Claudication          High resolution bilateral lower extremity arterial Doppler wave forms and          pressures were obtained and evaluated   Upper extremity arterial pressures          were obtained for calculation of the ankle brachial index calculation (LISA)          The study was technically adequate  No prior study available for comparison           Right Leg: Biphasic common femoral, and monophasic femoral, popliteal, and          tibial signals were noted  The thigh pressure was 200 mmHg, the calf          pressure 150 mmHg, and the highest ankle brachial index was 0 74 at the          posterior tibial (140 mm Hg)  The dorsalis pedis index was 0 68 (130)          Left Leg: Monophasic signals were noted throughout the left lower extremity          The thigh pressure was 122 mm Hg, the calf pressure 122 mm Hg, the posterior          tibial artery 110 mm Hg with an index of 0 58, and dorsalis pedis 122 Mm Hg          with an index is 0 64           IMPRESSION:          1  Moderate right lower extremity arterial compromise, with likely          femoral-popliteal artery occlusive disease          2  Moderate left lower extremity arterial compromise, with likely iliofemoral          artery occlusive disease          The moderate degree of occlusive disease described above is consistent with          vasculogenic claudication  Clinical correlation is recommended    Imaging: No results found  XR femur 1 vw left    (Results Pending)   XR spine lumbar minimum 4 views non injury    (Results Pending)   CT lung screening program    (Results Pending)     No results found for this or any previous visit  Physical Exam   Constitutional: She is oriented to person, place, and time  She appears well-developed  Over weight   HENT:   Right Ear: External ear normal    Left Ear: External ear normal    Eyes: Right eye exhibits no discharge  Left eye exhibits no discharge  No scleral icterus  Neck: Carotid bruit is not present  No tracheal deviation present  No thyroid mass and no thyromegaly present  Cardiovascular: Normal rate, regular rhythm, normal heart sounds and intact distal pulses  Exam reveals no gallop and no friction rub      No murmur heard   Pulmonary/Chest: No respiratory distress  She has no wheezes  She has no rales  Musculoskeletal: She exhibits no edema  Lymphadenopathy:     She has no cervical adenopathy  Neurological: She is alert and oriented to person, place, and time  Coordination normal    Psychiatric: She has a normal mood and affect  Her behavior is normal  Judgment and thought content normal    Nursing note and vitals reviewed

## 2019-01-23 ENCOUNTER — PATIENT OUTREACH (OUTPATIENT)
Dept: FAMILY MEDICINE CLINIC | Facility: CLINIC | Age: 74
End: 2019-01-23

## 2019-01-23 DIAGNOSIS — Z71.89 COMPLEX CARE COORDINATION: Primary | ICD-10-CM

## 2019-01-28 ENCOUNTER — HOSPITAL ENCOUNTER (OUTPATIENT)
Dept: RADIOLOGY | Facility: HOSPITAL | Age: 74
Discharge: HOME | End: 2019-01-28
Attending: INTERNAL MEDICINE
Payer: COMMERCIAL

## 2019-01-28 DIAGNOSIS — M79.605 PAIN OF LEFT LOWER EXTREMITY: ICD-10-CM

## 2019-01-28 DIAGNOSIS — M48.061 SPINAL STENOSIS OF LUMBAR REGION WITHOUT NEUROGENIC CLAUDICATION: ICD-10-CM

## 2019-01-28 DIAGNOSIS — M51.26 LUMBAR DISC HERNIATION: ICD-10-CM

## 2019-01-28 PROCEDURE — 72110 X-RAY EXAM L-2 SPINE 4/>VWS: CPT

## 2019-01-28 PROCEDURE — 73552 X-RAY EXAM OF FEMUR 2/>: CPT

## 2019-01-29 ENCOUNTER — TELEPHONE (OUTPATIENT)
Dept: INTERNAL MEDICINE CLINIC | Facility: CLINIC | Age: 74
End: 2019-01-29

## 2019-01-29 PROBLEM — M47.816 SPONDYLOSIS OF LUMBAR REGION WITHOUT MYELOPATHY OR RADICULOPATHY: Status: ACTIVE | Noted: 2019-01-29

## 2019-01-29 NOTE — TELEPHONE ENCOUNTER
Pt informed about femur x ray results as per Dr Ksenia Mccauley  If pain persists she is to call us

## 2019-01-30 ENCOUNTER — HOSPITAL ENCOUNTER (EMERGENCY)
Facility: HOSPITAL | Age: 74
Discharge: HOME | End: 2019-01-30
Attending: EMERGENCY MEDICINE
Payer: COMMERCIAL

## 2019-01-30 ENCOUNTER — PATIENT OUTREACH (OUTPATIENT)
Dept: FAMILY MEDICINE CLINIC | Facility: CLINIC | Age: 74
End: 2019-01-30

## 2019-01-30 ENCOUNTER — TELEPHONE (OUTPATIENT)
Dept: INTERNAL MEDICINE CLINIC | Facility: CLINIC | Age: 74
End: 2019-01-30

## 2019-01-30 ENCOUNTER — APPOINTMENT (EMERGENCY)
Dept: ULTRASOUND IMAGING | Facility: HOSPITAL | Age: 74
End: 2019-01-30
Payer: COMMERCIAL

## 2019-01-30 ENCOUNTER — APPOINTMENT (EMERGENCY)
Dept: RADIOLOGY | Facility: HOSPITAL | Age: 74
End: 2019-01-30
Payer: COMMERCIAL

## 2019-01-30 VITALS
DIASTOLIC BLOOD PRESSURE: 108 MMHG | TEMPERATURE: 98.5 F | HEART RATE: 99 BPM | HEIGHT: 58 IN | BODY MASS INDEX: 30.77 KG/M2 | SYSTOLIC BLOOD PRESSURE: 207 MMHG | RESPIRATION RATE: 18 BRPM | OXYGEN SATURATION: 97 % | WEIGHT: 146.61 LBS

## 2019-01-30 DIAGNOSIS — M48.061 SPINAL STENOSIS OF LUMBAR REGION: ICD-10-CM

## 2019-01-30 DIAGNOSIS — M79.605 LEFT LEG PAIN: Primary | ICD-10-CM

## 2019-01-30 DIAGNOSIS — I73.9 PERIPHERAL ARTERIAL DISEASE (HCC): ICD-10-CM

## 2019-01-30 PROCEDURE — 93926 LOWER EXTREMITY STUDY: CPT

## 2019-01-30 PROCEDURE — 73564 X-RAY EXAM KNEE 4 OR MORE: CPT

## 2019-01-30 PROCEDURE — 93971 EXTREMITY STUDY: CPT | Performed by: SURGERY

## 2019-01-30 PROCEDURE — 99284 EMERGENCY DEPT VISIT MOD MDM: CPT

## 2019-01-30 PROCEDURE — 93922 UPR/L XTREMITY ART 2 LEVELS: CPT | Performed by: SURGERY

## 2019-01-30 PROCEDURE — 93926 LOWER EXTREMITY STUDY: CPT | Performed by: SURGERY

## 2019-01-30 PROCEDURE — 93971 EXTREMITY STUDY: CPT

## 2019-01-30 RX ORDER — OXYCODONE HYDROCHLORIDE AND ACETAMINOPHEN 5; 325 MG/1; MG/1
1 TABLET ORAL EVERY 6 HOURS PRN
Qty: 16 TABLET | Refills: 0 | Status: SHIPPED | OUTPATIENT
Start: 2019-01-30 | End: 2019-02-02

## 2019-01-30 RX ORDER — OXYCODONE HYDROCHLORIDE AND ACETAMINOPHEN 5; 325 MG/1; MG/1
1 TABLET ORAL ONCE
Status: COMPLETED | OUTPATIENT
Start: 2019-01-30 | End: 2019-01-30

## 2019-01-30 RX ORDER — ONDANSETRON 4 MG/1
4 TABLET, ORALLY DISINTEGRATING ORAL ONCE
Status: COMPLETED | OUTPATIENT
Start: 2019-01-30 | End: 2019-01-30

## 2019-01-30 RX ADMIN — ONDANSETRON 4 MG: 4 TABLET, ORALLY DISINTEGRATING ORAL at 12:39

## 2019-01-30 RX ADMIN — OXYCODONE HYDROCHLORIDE AND ACETAMINOPHEN 1 TABLET: 5; 325 TABLET ORAL at 12:39

## 2019-01-30 NOTE — ED PROVIDER NOTES
History  Chief Complaint   Patient presents with    Leg Pain     pt states to have had fall 5 weeks ago and was c/o pain in left leg  pt states to have been to PCP and stated "PCP thinks its pinched nerve" pt came to our hospital yesterday for Xrays of leg that were negative  pt states "the pain is so bad i can't take it anymore"      72year old female with past medical history significant for hypertension, stroke, CKD3 and peripheral artery disease presents to ed at recommendation of her PCP with chief complaint of left knee and thigh pain  Onset: patient reports she fell 5 weeks ago while walking dog and injured left leg  She reports pain since then which has been getting worse  Location of symptoms reported as left thigh  Quality is reported as sharp burning pain  Severity is reported as severe, listed as 9/10 on the pain scale  Associated symptoms:  Denies paralysis or weakness of leg  Positive for thigh pain  Denies rash or fevers  Modifiers: walking, weight bearing, movement exacerbates pain  Context: patient reports she was seen by her PCP for symptoms - he felt is was a pinched nerve, but when symptoms persisted ordered an xray of her femur which was reportedly negative  She reports pain was getting worsen and PCP ordered outpatient vascular arterial studies but patient was in too much pain to have them done  She called them today and they recommended she come to ED for further evaluation due to history of known significant PAD and worsening pain symptoms  Reviewed past visits via EPIC  No pror visits to this ed  Femur xray results on 1/28/2019 were reviewed, no fracture  History provided by:  Patient and relative   used: No        Prior to Admission Medications   Prescriptions Last Dose Informant Patient Reported? Taking?    Cholecalciferol (VITAMIN D3) 2000 units capsule  Self Yes No   Sig: Take 1 capsule by mouth every other day     Omega-3 Fatty Acids (SM FISH OIL) 1200 MG CAPS   Yes No   Sig: Take by mouth 2 (two) times a day   amLODIPine (NORVASC) 5 mg tablet   Yes No   Sig: Take 5 mg by mouth 2 (two) times a day   chlorthalidone 25 mg tablet   No No   Sig: Take 1 tablet (25 mg total) by mouth daily   clopidogrel (PLAVIX) 75 mg tablet   Yes No   Sig: Take 1 tablet by mouth daily   ferrous sulfate 325 (65 Fe) mg tablet   Yes No   Sig: Take 325 mg by mouth daily   folic acid (FOLVITE) 1 mg tablet   Yes No   Sig: Take 1 mg by mouth daily   losartan (COZAAR) 100 MG tablet  Self Yes No   Sig: Take 100 mg by mouth daily   losartan (COZAAR) 50 mg tablet  Self Yes No   Sig: Take 1 tablet by mouth daily     traMADol (ULTRAM) 50 mg tablet   No No   Sig: Take 1 tablet (50 mg total) by mouth 2 (two) times a day as needed for moderate pain      Facility-Administered Medications: None       Past Medical History:   Diagnosis Date    Arthritis     Coronary artery disease     Diabetes mellitus (HCC)     Endometriosis     High cholesterol     Hypertension     Kidney disease, chronic, stage III (moderate, EGFR 30-59 ml/min) ([facility])     Lumbar disc herniation     Peripheral vascular disease (HCC)     Shoulder injury     left    Spinal stenosis     Stroke [facility])        Past Surgical History:   Procedure Laterality Date    ROTATOR CUFF REPAIR Left     TONSILLECTOMY         Family History   Problem Relation Age of Onset    Hypertension Mother     Heart disease Mother         Valvular    Hyperlipidemia Mother     Hypertension Father     Heart defect Father         Cardiomegaly    Stroke Sister         Cerebrovascular Accident    Arthritis Brother     Other Brother         Back Disorder     I have reviewed and agree with the history as documented      Social History   Substance Use Topics    Smoking status: Current Every Day Smoker     Packs/day: 1 00     Types: Cigarettes    Smokeless tobacco: Never Used    Alcohol use No        Review of Systems   Constitutional: Negative for activity change, appetite change, chills, diaphoresis, fatigue and fever  HENT: Negative for congestion, dental problem, drooling, ear discharge, ear pain, facial swelling, hearing loss, mouth sores, nosebleeds, postnasal drip, rhinorrhea, sinus pain, sinus pressure, sneezing, sore throat, tinnitus, trouble swallowing and voice change  Eyes: Negative for photophobia, pain, discharge, redness and itching  Respiratory: Negative for apnea, cough, choking, chest tightness, shortness of breath, wheezing and stridor  Cardiovascular: Negative for chest pain, palpitations and leg swelling  Gastrointestinal: Negative for abdominal distention, abdominal pain, anal bleeding, blood in stool, constipation, diarrhea, nausea, rectal pain and vomiting  Endocrine: Negative for cold intolerance, heat intolerance, polydipsia, polyphagia and polyuria  Genitourinary: Negative for decreased urine volume, difficulty urinating, dysuria, flank pain, frequency, hematuria and urgency  Musculoskeletal: Positive for arthralgias and gait problem  Negative for back pain, joint swelling, myalgias, neck pain and neck stiffness  Skin: Negative for color change, pallor, rash and wound  Allergic/Immunologic: Negative for environmental allergies, food allergies and immunocompromised state  Neurological: Negative for dizziness, tremors, seizures, syncope, facial asymmetry, speech difficulty, weakness, light-headedness, numbness and headaches  Hematological: Negative for adenopathy  Does not bruise/bleed easily  Psychiatric/Behavioral: Negative for agitation, confusion, decreased concentration and hallucinations  The patient is not nervous/anxious  All other systems reviewed and are negative  Physical Exam  Physical Exam   Constitutional: She is oriented to person, place, and time  She appears well-developed and well-nourished  No distress     BP (!) 207/108 (BP Location: Right arm)   Pulse 99   Temp 98 5 °F (36 9 °C) (Oral)   Resp 18   Ht 4' 10" (1 473 m)   Wt 66 5 kg (146 lb 9 7 oz)   LMP  (LMP Unknown)   SpO2 97%   BMI 30 64 kg/m²    HENT:   Head: Normocephalic and atraumatic  Right Ear: External ear normal    Left Ear: External ear normal    Nose: Nose normal    Mouth/Throat: Oropharynx is clear and moist  No oropharyngeal exudate  Eyes: Pupils are equal, round, and reactive to light  Conjunctivae and EOM are normal  Right eye exhibits no discharge  Left eye exhibits no discharge  No scleral icterus  Neck: Normal range of motion  Neck supple  No JVD present  No tracheal deviation present  No thyromegaly present  Cardiovascular: Normal rate, regular rhythm and intact distal pulses  Pulmonary/Chest: Effort normal and breath sounds normal  No respiratory distress  She has no wheezes  She has no rales  She exhibits no tenderness  Abdominal: Soft  Bowel sounds are normal  She exhibits no distension and no mass  There is no tenderness  There is no rebound and no guarding  No hernia  Musculoskeletal: She exhibits tenderness  She exhibits no edema or deformity  Left lower extremity - normal inspection, no gross deformity, there is tenderness to palpation distal aspect of anteromedial and anterolateral thigh  Palpation of these area exacerbates pain  Left hip is normal to inspection, nontender to palpation with FROM  Left ankle is normal to inspection, nontender to palpation with FROM  No palpable cords  Or posterior calf pain  Left knee is normal to inspection,  Medial and lateral joint lines nontender to palpation  No obvious effusion  Distal neurovascular intact to left foot  Posterior tibial pulse 1/4 present  Cap refill 3 seconds  All compartments of left lower leg soft, warm, perfused  Lymphadenopathy:     She has no cervical adenopathy  Neurological: She is alert and oriented to person, place, and time  She displays normal reflexes  No cranial nerve deficit or sensory deficit   She exhibits normal muscle tone  Coordination normal    Skin: Skin is warm and dry  Capillary refill takes less than 2 seconds  No rash noted  She is not diaphoretic  No erythema  No pallor  Psychiatric: She has a normal mood and affect  Her behavior is normal  Judgment and thought content normal    Nursing note and vitals reviewed  Vital Signs  ED Triage Vitals [01/30/19 1125]   Temperature Pulse Respirations Blood Pressure SpO2   98 5 °F (36 9 °C) 99 18 (!) 207/108 97 %      Temp Source Heart Rate Source Patient Position - Orthostatic VS BP Location FiO2 (%)   Oral Monitor Sitting Right arm --      Pain Score       5           Vitals:    01/30/19 1125   BP: (!) 207/108   Pulse: 99   Patient Position - Orthostatic VS: Sitting       Visual Acuity      ED Medications  Medications   oxyCODONE-acetaminophen (PERCOCET) 5-325 mg per tablet 1 tablet (1 tablet Oral Given 1/30/19 1239)   ondansetron (ZOFRAN-ODT) dispersible tablet 4 mg (4 mg Oral Given 1/30/19 1239)       Diagnostic Studies  Results Reviewed     None                 VAS lower limb venous duplex study, unilateral/limited   Final Result by Jose Crum DO (01/30 1433)      XR knee 4+ views left injury   Final Result by Ronal Hsu MD (01/30 1317)      No acute osseous abnormality  Workstation performed: [id number]         VAS lower limb arterial duplex, limited, unilateral    (Results Pending)              Procedures  Procedures       Phone Contacts  ED Phone Contact    ED Course  ED Course as of Jan 30 1513   Wed Jan 30, 2019   1456 Patient feels improved after analgesics given in ed  Able to ambulate to bathroom in ED without difficulty  Does use cane at home but would benefit from walker for stability                                   MDM  Number of Diagnoses or Management Options  Left leg pain: new and requires workup  Peripheral arterial disease ([facility] [address] ): new and requires workup  Spinal stenosis of lumbar region: new and requires workup  Diagnosis management comments: ddx includes but is not limited to: fracture, hematoma, strain, muscle spasm, lumbar radiculopathy, DVT, claudication, PAD, ischemic limb, OA, RA, plan workup including arterial and venous studies of leg, add xrays knee and reassess  Arterial Studies done at [facility] on 5/7/2014: High resolution bilateral lower extremity arterial Doppler wave forms and          pressures were obtained and evaluated  Upper extremity arterial pressures          were obtained for calculation of the ankle brachial index calculation (LISA)          The study was technically adequate  No prior study available for comparison           Right Leg: Biphasic common femoral, and monophasic femoral, popliteal, and          tibial signals were noted  The thigh pressure was 200 mmHg, the calf          pressure 150 mmHg, and the highest ankle brachial index was 0 74 at the          posterior tibial (140 mm Hg)  The dorsalis pedis index was 0 68 (130)          Left Leg: Monophasic signals were noted throughout the left lower extremity          The thigh pressure was 122 mm Hg, the calf pressure 122 mm Hg, the posterior          tibial artery 110 mm Hg with an index of 0 58, and dorsalis pedis 122 Mm Hg          with an index is 0 64           IMPRESSION:          1  Moderate right lower extremity arterial compromise, with likely          femoral-popliteal artery occlusive disease          2  Moderate left lower extremity arterial compromise, with likely iliofemoral          artery occlusive disease          The moderate degree of occlusive disease described above is consistent with          vasculogenic claudication  Clinical correlation is recommended    Patient's arterial studies consistent with peripheral arterial disease  Will xray knee to rule out alternate etiology of thigh pain  If unremarkable consider severe claudication/arterial disease as source of leg pain symptoms    Will get arterial studies of left leg for comparison  Will add dvt studies due to recent fall/trauma and patient reported significant bruising to left thigh just after injury  Considered CTA of left leg but patient with CKD 3 GFR 31  Arterial duplex left leg results reviewed: FINDINGS:     Left                   Impression      PSV  EDV    Dist EIA               50-75%          241    8    Common Femoral Artery                  111    8    Prox Profunda                           58    2    Dist  Profunda         Not visualized              Prox SFA               50-75%          205    4    Mid SFA                                 79    4    Dist SFA                                41    2    Proximal Pop                            30    3    Distal Pop                              31    1    Prox Post Tibial                        30    4    Dist Post Tibial                        17    4    Dist  Ant  Tibial                       15    3    Dist Peroneal                           41    3             CONCLUSION:  RIGHT LOWER LIMB:  Ankle/Brachial index: 0 8 (Moderate), prior 0 7 (5/7/2014 - outside facility)  Metatarsal pressure of 148 mmHg  Great toe pressure of 144 mmHg, within healing range  PVR/ PPG tracings are dampened      LEFT LOWER LIMB:  Monophasic waveforms are noted throughout entire left lower extremity  suggestive of iliofemoral artery occlusive disease  There is a focal 50-75%  stenosis noted in the distal external iliac artery and a 50-70% focal stenosis  noted in the proximal superficial femoral artery  Ankle/Brachial index: 0 55 (Severe), prior 0 58 (5/7/2014 - outside facility)  Metatarsal pressure of 143 mmHg  Great toe pressure of 150 mmHg, within healing range  PVR/ PPG tracings are dampened      Technical findings given to Bacilio Aguilar PA-C at 1400 1/30/2019    Compared to previous study on 5/7/2014 (outside facility, report accessed  through "care everywhere", there is no significant change in the disease  process  Recommend repeat testing in 6 months as per protocol unless otherwise  Indicated  US LLE negative for DVt  Discussed all test results with patient at bedside  Patient is significantly improved after analgesics given in ed  Will upgrade analgesics  Discontinue tramadol, start oxycodone  Standard narcotic precautions given  Will prescribe walker for further stability  Patient agrees with same  Instructed to follow up with vascular surgery for further evaluation of symptoms in 1-2 days  Follow up with Dr Burleson January in 1-2 days for recheck  Follow up with neurology in 3-5 days for further evaluation of spinal stenosis  I discussed with patient I suspect that patients symptoms are multifactorial with contributions from claudication/peripheral arterial disease in addition to spinal stenosis/radiculitis as well as osteoarthritis  Discussed will treat with analgesics,  She is scheduled to start physical therapy for spinal stenosis  Compared to prior arterial studies, there is no significant change in PAD  No ischemia on evaluation today  Discussed signs and symptoms to watch for regarding claudication/peripheral arterial disease  Patient feels improved and comfortable with discharge home  Reviewed reasons to return to ed  Patient verbalized understanding of diagnosis and agreement with discharge plan of care as well as understanding of reasons to return to ed  bp is elevated however in patient with significant PAD will allow due to concern for limb ischemia if pressures were lower                     Amount and/or Complexity of Data Reviewed  Tests in the radiology section of CPT®: ordered and reviewed  Tests in the medicine section of CPT®: ordered and reviewed  Discussion of test results with the performing providers: yes  Obtain history from someone other than the patient: yes (Family member at bedside  )  Review and summarize past medical records: yes  Independent visualization of images, tracings, or specimens: yes    Patient Progress  Patient progress: improved      Disposition  Final diagnoses:   Left leg pain   Peripheral arterial disease ([facility] [address] )   Spinal stenosis of lumbar region     Time reflects when diagnosis was documented in both MDM as applicable and the Disposition within this note     Time User Action Codes Description Comment    1/30/2019  2:57 PM Donnise Blue Add [S70 283] Left leg pain     1/30/2019  2:57 PM Donnise Blue Add [I73 9] Peripheral arterial disease ([facility] [address] )     1/30/2019  2:57 PM Donnise Blue Add [T15 352] Spinal stenosis of lumbar region       ED Disposition     ED Disposition Condition Date/Time Comment    Discharge  Wed Jan 30, 2019  2:58 PM Parul Mark discharge to home/self care  Condition at discharge: Stable        Follow-up Information     Follow up With Specialties Details Why Contact Info Additional Skylar 163, DO Internal Medicine Call in 1 day for further evaluation of symptoms [address]  [address]  [facility] 34 Emergency Department Emergency Medicine Go to If symptoms worsen 34 [facility] [address]  [phone] [place] ED, [address] [place], [place], [address] [facility], MD Vascular Surgery, Radiology Call in 1 day for further evaluation of symptoms [facility] [address]  [address] [facility] [address] [facility]  [phone]             Patient's Medications   Discharge Prescriptions    OXYCODONE-ACETAMINOPHEN (PERCOCET) 5-325 MG PER TABLET    Take 1 tablet by mouth every 6 (six) hours as needed for severe pain (leg pain/PAD/radiculitis/initial rx ) for up to 3 days Label no driving no etoh  Initial rx  Dx: Max Daily Amount: 4 tablets       Start Date: 1/30/2019 End Date: 2/2/2019       Order Dose: 1 tablet       Quantity: 16 tablet    Refills: 0     No discharge procedures on file      ED Provider  Electronically Signed by           Galina Massey PA-C  01/30/19 8527

## 2019-01-30 NOTE — DISCHARGE INSTRUCTIONS
Leg Pain   WHAT YOU NEED TO KNOW:   Leg pain may be caused by a variety of health conditions  Your tests did not show any broken bones or blood clots  DISCHARGE INSTRUCTIONS:   Return to the emergency department if:   · You have a fever  · Your leg starts to swell  · Your leg pain gets worse  · You have numbness or tingling in your leg or toes  · You cannot put any weight on or move your leg  Contact your healthcare provider if:   · Your pain does not decrease, even after treatment  · You have questions or concerns about your condition or care  Medicines:   · NSAIDs , such as ibuprofen, help decrease swelling, pain, and fever  This medicine is available with or without a doctor's order  NSAIDs can cause stomach bleeding or kidney problems in certain people  If you take blood thinner medicine, always ask your healthcare provider if NSAIDs are safe for you  Always read the medicine label and follow directions  · Take your medicine as directed  Contact your healthcare provider if you think your medicine is not helping or if you have side effects  Tell him of her if you are allergic to any medicine  Keep a list of the medicines, vitamins, and herbs you take  Include the amounts, and when and why you take them  Bring the list or the pill bottles to follow-up visits  Carry your medicine list with you in case of an emergency  Follow up with your healthcare provider as directed: You may need more tests to find the cause of your leg pain  You may need to see an orthopedic specialist or a physical therapist  Write down your questions so you remember to ask them during your visits  Manage your leg pain:   · Rest  your injured leg so that it can heal  You may need an immobilizer, brace, or splint to limit the movement of your leg  You may need to avoid putting any weight on your leg for at least 48 hours  Return to normal activities as directed      · Ice  the injury for 20 minutes every 4 hours for up to 24 hours, or as directed  Use an ice pack, or put crushed ice in a plastic bag  Cover it with a towel to protect your skin  Ice helps prevent tissue damage and decreases swelling and pain  · Elevate  your injured leg above the level of your heart as often as you can  This will help decrease swelling and pain  If possible, prop your leg on pillows or blankets to keep the area elevated comfortably  · Use assistive devices as directed  You may need to use a cane or crutches  Assistive devices help decrease pain and pressure on your leg when you walk  Ask your healthcare provider for more information about assistive devices and how to use them correctly  · Maintain a healthy weight  Extra body weight can cause pressure and pain in your hip, knee, and ankle joints  Ask your healthcare provider how much you should weigh  Ask him to help you create a weight loss plan if you are overweight  © 2017 [employer] Information is for End User's use only and may not be sold, redistributed or otherwise used for commercial purposes  All illustrations and images included in CareNotes® are the copyrighted property of [employer]  or [employer]  The above information is an  only  It is not intended as medical advice for individual conditions or treatments  Talk to your doctor, nurse or pharmacist before following any medical regimen to see if it is safe and effective for you  Peripheral Artery Disease   WHAT YOU NEED TO KNOW:   Peripheral artery disease (PAD) is narrow, weak, or blocked arteries  It may affect any arteries outside of your heart and brain  PAD is usually the result of a buildup of fat and cholesterol, also called plaque, along your artery walls  Inflammation, a blood clot, or abnormal cell growth could also block your arteries  PAD prevents normal blood flow to your legs and arms   You are at risk of an amputation if poor blood flow keeps wounds from healing or causes gangrene (tissue death)  Without treatment, PAD can also cause a heart attack or stroke  DISCHARGE INSTRUCTIONS:   Call 911 for the following:   · You have any of the following signs of a heart attack:      ¨ Squeezing, pressure, or pain in your chest that lasts longer than 5 minutes or returns    ¨ Discomfort or pain in your back, neck, jaw, stomach, or arm     ¨ Trouble breathing    ¨ Nausea or vomiting    ¨ Lightheadedness or a sudden cold sweat, especially with chest pain or trouble breathing    · You have any of the following signs of a stroke:      ¨ Numbness or drooping on one side of your face     ¨ Weakness in an arm or leg    ¨ Confusion or difficulty speaking    ¨ Dizziness, a severe headache, or vision loss  Seek care immediately if:   · You have sores or wounds that will not heal      · You notice black or discolored skin on your arm or leg  · Your skin is cool to the touch  Contact your healthcare provider if:   · You have leg pain when you walk 1/8 mile (200 meters) or less, even with treatment  · Your legs are red, dry, or pale, even with treatment  · You have questions or concerns about your condition or care  Manage PAD:   · Walk for 30 to 60 minutes at least 4 times a week  Your healthcare provider may also refer you to an supervised exercise program  The program helps increase how far you can walk without pain  It also helps you stay active in normal daily activities and may prevent disability caused by PAD  · Do not smoke  Nicotine and other chemicals in cigarettes and cigars can worsen PAD  They can also increase your risk for a heart attack or stroke  Ask your healthcare provider for information if you currently smoke and need help to quit  E-cigarettes or smokeless tobacco still contain nicotine  Talk to your healthcare provider before you use these products  · Manage other health conditions    Take your medicines as directed and follow your healthcare provider's instructions if you have high blood pressure or high cholesterol  Perform foot care and check your blood sugar levels as directed if you have diabetes  · Eat heart healthy foods  Eat whole grains, fruits, and vegetables every day  Limit salt and high-fat foods  Ask your healthcare provider for more information on a heart healthy diet  Ask if you need to lose weight  Your healthcare provider can help you create a healthy weight-loss plan  Medicines:   · Antiplatelet medicine,  such as aspirin, helps prevent blood clots and reduces the risk of a heart attack or stroke  · Statin medicine  helps lower your cholesterol and prevents your PAD from getting worse  · Take your medicine as directed  Contact your healthcare provider if you think your medicine is not helping or if you have side effects  Tell him or her if you are allergic to any medicine  Keep a list of the medicines, vitamins, and herbs you take  Include the amounts, and when and why you take them  Bring the list or the pill bottles to follow-up visits  Carry your medicine list with you in case of an emergency  Follow up with your healthcare provider as directed:  Write down your questions so you remember to ask them during your visits  © 2017 [address] [facility] Information is for End User's use only and may not be sold, redistributed or otherwise used for commercial purposes  All illustrations and images included in CareNotes® are the copyrighted property of [employer] M [employer]  or [employer]  The above information is an  only  It is not intended as medical advice for individual conditions or treatments  Talk to your doctor, nurse or pharmacist before following any medical regimen to see if it is safe and effective for you  Lumbar Spinal Stenosis   WHAT YOU NEED TO KNOW:   Lumbar spinal stenosis is narrowing of the spinal canal in your lower back   Your spinal canal holds your spinal cord  The spinal cord controls your ability to move  When your spinal canal narrows, it may put pressure on your spinal cord  DISCHARGE INSTRUCTIONS:   Return to the emergency department if:   · You have pain in your leg that does not go away or gets worse  · You have trouble moving your legs  · You cannot control when you urinate or have a bowel movement  Contact your healthcare provider if:   · You have new or worsening symptoms  · Your symptoms keep you from doing your daily activities  · You have questions or concerns about your condition or care  Medicines:   · NSAIDs , such as ibuprofen, help decrease swelling, pain, and fever  NSAIDs can cause stomach bleeding or kidney problems in certain people  If you take blood thinner medicine, always ask your healthcare provider if NSAIDs are safe for you  Always read the medicine label and follow directions  · Acetaminophen  decreases pain and fever  It is available without a doctor's order  Ask how much to take and how often to take it  Follow directions  Read the labels of all other medicines you are using to see if they also contain acetaminophen, or ask your doctor or pharmacist  Acetaminophen can cause liver damage if not taken correctly  Do not use more than 4 grams (4,000 milligrams) total of acetaminophen in one day  · Prescription pain medicine  may be given  Ask how to take this medicine safely  · Muscle relaxers  help decrease pain and muscle spasms  · Take your medicine as directed  Contact your healthcare provider if you think your medicine is not helping or if you have side effects  Tell him or her if you are allergic to any medicine  Keep a list of the medicines, vitamins, and herbs you take  Include the amounts, and when and why you take them  Bring the list or the pill bottles to follow-up visits  Carry your medicine list with you in case of an emergency  Self-care:   · Rest  when you feel it is needed   Slowly start to do more each day  Return to your daily activities as directed  · Apply heat on your back for 20 to 30 minutes every 2 hours for as many days as directed  Heat helps decrease pain and muscle spasms  · Apply ice  on your back for 15 to 20 minutes every hour or as directed  Use an ice pack, or put crushed ice in a plastic bag  Cover it with a towel before you apply it to your skin  Ice helps prevent tissue damage and decreases swelling and pain  Go to physical and occupational therapy as directed:  A physical therapist teaches you exercises to help improve movement and strength, and to decrease pain  An occupational therapist teaches you skills to help with your daily activities  Follow up with your healthcare provider as directed:  Write down your questions so you remember to ask them during your visits  © 2017 [employer] [employer] Information is for End User's use only and may not be sold, redistributed or otherwise used for commercial purposes  All illustrations and images included in CareNotes® are the copyrighted property of [employer]  or [employer]  The above information is an  only  It is not intended as medical advice for individual conditions or treatments  Talk to your doctor, nurse or pharmacist before following any medical regimen to see if it is safe and effective for you

## 2019-01-30 NOTE — TELEPHONE ENCOUNTER
Pt states her left leg is so painful she is unable to walk,stand on it or sleep  Pt states she was not able to have her U/S of the leg the patient advised to to to ER for eval and Tx, F/u with Dr Ridley after  Pt is happy with Plan  Dr Ridley aware

## 2019-02-04 ENCOUNTER — OFFICE VISIT (OUTPATIENT)
Dept: INTERNAL MEDICINE CLINIC | Facility: CLINIC | Age: 74
End: 2019-02-04
Payer: COMMERCIAL

## 2019-02-04 VITALS
SYSTOLIC BLOOD PRESSURE: 162 MMHG | DIASTOLIC BLOOD PRESSURE: 80 MMHG | HEIGHT: 58 IN | BODY MASS INDEX: 30.23 KG/M2 | RESPIRATION RATE: 18 BRPM | HEART RATE: 85 BPM | TEMPERATURE: 97.8 F | WEIGHT: 144 LBS | OXYGEN SATURATION: 98 %

## 2019-02-04 DIAGNOSIS — M51.26 LUMBAR DISC HERNIATION: Primary | ICD-10-CM

## 2019-02-04 DIAGNOSIS — E11.9 TYPE 2 DIABETES MELLITUS WITHOUT COMPLICATION, WITHOUT LONG-TERM CURRENT USE OF INSULIN (HCC): ICD-10-CM

## 2019-02-04 DIAGNOSIS — I10 HYPERTENSION, UNSPECIFIED TYPE: ICD-10-CM

## 2019-02-04 DIAGNOSIS — I73.9 CLAUDICATION IN PERIPHERAL VASCULAR DISEASE (HCC): ICD-10-CM

## 2019-02-04 DIAGNOSIS — M48.061 SPINAL STENOSIS OF LUMBAR REGION WITHOUT NEUROGENIC CLAUDICATION: ICD-10-CM

## 2019-02-04 PROCEDURE — 99214 OFFICE O/P EST MOD 30 MIN: CPT | Performed by: NURSE PRACTITIONER

## 2019-02-04 PROCEDURE — 3008F BODY MASS INDEX DOCD: CPT | Performed by: NURSE PRACTITIONER

## 2019-02-04 RX ORDER — PREDNISONE 10 MG/1
TABLET ORAL
Qty: 30 TABLET | Refills: 0 | Status: SHIPPED | OUTPATIENT
Start: 2019-02-04 | End: 2019-02-25 | Stop reason: ALTCHOICE

## 2019-02-04 NOTE — PROGRESS NOTES
Assessment/Plan:    1  Lumbar radiculopathy- Start Prednisone  This medication will cause your blood sugars to temporarily rise  2  Spinal stenosis- Will be following up with Dr Brant Beard next month  3  Peripheral artery disease- Please make sure you keep your appointment with Dr Karina Peterson  4  Hypertension- Not at goal  Just started Chlorthalidone, will be checking labs soon  Follow up with me as needed and with Dr Quirino Justice as scheduled next month  Diagnoses and all orders for this visit:    Lumbar disc herniation  -     predniSONE 10 mg tablet; Take 4 tabs by mouth x 3 days, then 3 tabs by mouth x 3 days, then 2 tabs by mouth x 3 days, then 1 tab by mouth x 3 days    Spinal stenosis of lumbar region without neurogenic claudication  -     predniSONE 10 mg tablet; Take 4 tabs by mouth x 3 days, then 3 tabs by mouth x 3 days, then 2 tabs by mouth x 3 days, then 1 tab by mouth x 3 days    Claudication in peripheral vascular disease (HCC)  -     predniSONE 10 mg tablet; Take 4 tabs by mouth x 3 days, then 3 tabs by mouth x 3 days, then 2 tabs by mouth x 3 days, then 1 tab by mouth x 3 days    Type 2 diabetes mellitus without complication, without long-term current use of insulin (HCC)    Hypertension, unspecified type    The patient was counseled regarding instructions for management, risk factor reductions, patient and family education,impressions, risks and benefits of treatment options, side effects of medications, importance of compliance with treatment  The treatment plan was reviewed with the patient/guardian and patient/guardian understands and agrees with the treatment plan              Current Outpatient Prescriptions:     amLODIPine (NORVASC) 5 mg tablet, Take 5 mg by mouth 2 (two) times a day, Disp: , Rfl:     chlorthalidone 25 mg tablet, Take 1 tablet (25 mg total) by mouth daily, Disp: 90 tablet, Rfl: 2    Cholecalciferol (VITAMIN D3) 2000 units capsule, Take 1 capsule by mouth every other day  , Disp: , Rfl:     clopidogrel (PLAVIX) 75 mg tablet, Take 1 tablet by mouth daily, Disp: , Rfl:     ferrous sulfate 325 (65 Fe) mg tablet, Take 325 mg by mouth daily, Disp: , Rfl:     folic acid (FOLVITE) 1 mg tablet, Take 1 mg by mouth daily, Disp: , Rfl:     losartan (COZAAR) 100 MG tablet, Take 100 mg by mouth daily, Disp: , Rfl:     losartan (COZAAR) 50 mg tablet, Take 1 tablet by mouth daily  , Disp: , Rfl:     Omega-3 Fatty Acids (SM FISH OIL) 1200 MG CAPS, Take by mouth 2 (two) times a day, Disp: , Rfl:     predniSONE 10 mg tablet, Take 4 tabs by mouth x 3 days, then 3 tabs by mouth x 3 days, then 2 tabs by mouth x 3 days, then 1 tab by mouth x 3 days, Disp: 30 tablet, Rfl: 0    Subjective:      Patient ID: Dhruv Leslie is a 72 y o  female  Recent ER visit for LLE burning sensation and difficulty walking  Knee XR was (-)  Vascular studies revealed no evidence of DVT and no change in PAD from previous studies  She was discharged on Oxycodone, which she takes sparingly, and her pain level is a 6 now  The following portions of the patient's history were reviewed and updated as appropriate:   She has a past medical history of Arthritis; Coronary artery disease; Diabetes mellitus ([address] ); Endometriosis; High cholesterol; Hypertension; Kidney disease, chronic, stage III (moderate, EGFR 30-59 ml/min) ([address] ); Lumbar disc herniation; Peripheral vascular disease ([address] ); Shoulder injury; Spinal stenosis; and Stroke ([facility] [address] )  ,   does not have any pertinent problems on file  ,   has a past surgical history that includes Rotator cuff repair (Left) and Tonsillectomy  ,  family history includes Arthritis in her brother; Heart defect in her father; Heart disease in her mother; Hyperlipidemia in her mother; Hypertension in her father and mother; Other in her brother; Stroke in her sister  ,   reports that she has been smoking Cigarettes  She has been smoking about 1 00 pack per day   She has never used smokeless tobacco  She reports that she does not drink alcohol or use drugs  ,  is allergic to atorvastatin; colesevelam; colestipol; ezetimibe; fenofibrate; pollen extract; rosuvastatin; and statins       Review of Systems   Constitutional: Negative  Respiratory: Negative  Cardiovascular: Negative  Musculoskeletal: Positive for gait problem  LLE and L hip pain   Psychiatric/Behavioral: Negative  Objective:  /80   Pulse 85   Temp 97 8 °F (36 6 °C) (Tympanic)   Resp 18   Ht 4' 10" (1 473 m)   Wt 65 3 kg (144 lb)   LMP  (LMP Unknown)   SpO2 98%   BMI 30 10 kg/m²     Lab Review  No visits with results within 2 Month(s) from this visit  Latest known visit with results is:   No results found for any previous visit  Imaging: Xr Spine Lumbar Minimum 4 Views Non Injury    Result Date: 1/29/2019  Narrative: LUMBAR SPINE INDICATION:   M48 061: Spinal stenosis, lumbar region without neurogenic claudication M79 605: Pain in left leg M51 26: Other intervertebral disc displacement, lumbar region  COMPARISON:  None VIEWS:  XR SPINE LUMBAR MINIMUM 4 VIEWS NON INJURY Images: 5 FINDINGS: There is S-shaped scoliosis of the tracker lumbar spine There is no evidence of acute fracture or destructive osseous lesion  Anterolisthesis of L4 over 5 seen Disc space narrowing seen at L4-5, L5-S1 Multilevel facet degenerative changes at L4-5, L5-S1 and L3-4 Mineralized vasculature seen The pedicles appear intact  Soft tissues are unremarkable  Impression: Degenerative disc disease with disc space narrowing at L4-5 and L5-S1 Degenerative anterolisthesis of L4 over 5 with the multilevel facet joint disease Mild retrolisthesis of L1 over L2 No acute compression collapse of the vertebra seen Workstation performed: [id number]     Xr Femur 2 Vw Left    Result Date: 1/29/2019  Narrative: LEFT FEMUR INDICATION:   M79 605: Pain in left leg   COMPARISON:  None VIEWS:  XR FEMUR 2 VW LEFT Images: 2 FINDINGS: There is no fracture or dislocation  Mild degenerative changes seen within the left hip joint  A linear lucency seen in the mid shaft of the femur with sharp margins suggest a nutrient channel No lytic or blastic lesions are seen  Mineralized vasculature seen     Impression: Mild degenerative changes in the left hip joint No acute displaced fracture seen Workstation performed: [id number]     Xr Knee 4+ Views Left Injury    Result Date: 1/30/2019  Narrative: LEFT KNEE INDICATION:   injury  COMPARISON:  None VIEWS:  XR KNEE 4+ VW LEFT INJURY FINDINGS: There is no acute fracture or dislocation  There is no joint effusion  No significant degenerative changes  No lytic or blastic lesions are seen  Soft tissues are unremarkable  Prominent vascular calcifications  Impression: No acute osseous abnormality  Workstation performed: [id number]     Vas Lower Limb Arterial Duplex, Limited, Unilateral    Result Date: 1/30/2019  Narrative:  [facility] REPORT CLINICAL: Indications:  Left Atherosclerosis with Rest Pain [I70 229]  The patient presents with increasing left upper leg/thigh pain x approximately five weeks  She has known arterial disease  Operative History: left rotator cuff pinning right ankle Risk Factors The patient has history of  HTN, HLD and PAD  She is a current smoker  Clinical Right Pressure: 207/ mm Hg, Left Pressure: IV    FINDINGS:  Left                   Impression      PSV  EDV  Dist EIA               50-75%          241    8  Common Femoral Artery                  111    8  Prox Profunda                           58    2  Dist  Profunda         Not visualized            Prox SFA               50-75%          205    4  Mid SFA                                 67    4  Dist SFA                                41    2  Proximal Pop                            30    3  Distal Pop                              31    1  Prox Post Tibial                        30    4  Dist Post Tibial                        17    4 Dist  Ant  Tibial                       15    3  Dist Peroneal                           41    3     CONCLUSION: Impression: RIGHT LOWER LIMB: Ankle/Brachial index: 0 8 (Moderate), prior 0 7 (5/7/2014 - outside facility)  Metatarsal pressure of 148 mmHg Great toe pressure of 144 mmHg, within healing range  PVR/ PPG tracings are dampened  LEFT LOWER LIMB: Monophasic waveforms are noted throughout entire left lower extremity suggestive of iliofemoral artery occlusive disease  There is a focal 50-75% stenosis noted in the distal external iliac artery and a 50-70% focal stenosis noted in the proximal superficial femoral artery  Ankle/Brachial index: 0 55 (Severe), prior 0 58 (5/7/2014 - outside facility)  Metatarsal pressure of 143 mmHg Great toe pressure of 150 mmHg, within healing range  PVR/ PPG tracings are dampened  Technical findings given to Pita Otero PA-C at 1400 1/30/2019  Compared to previous study on 5/7/2014 (outside facility, report accessed through "care everywhere", there is no significant change in the disease process  Recommend repeat testing in 6 months as per protocol unless otherwise indicated  SIGNATURE: Electronically Signed by: Angelique Thompson MD, RPVI on 2019-01-30 03:23:40 PM    Vas Lower Limb Venous Duplex Study, Unilateral/limited    Result Date: 1/30/2019  Narrative:  [facility] REPORT CLINICAL: Indications: Left Limb Pain [M79 609]  The patient presents with constant left upper leg pain x approximately five weeks (since falling)  She has no leg swelling  Operative History: left rotator cuff pinning right ankle Risk Factors The patient has history of HTN, HLD and PAD  She has no history of DVT  FINDINGS:  Segment  Right            Left              Impression       Impression       CFV      Normal (Patent)  Normal (Patent)     CONCLUSION:  Impression: RIGHT LOWER LIMB LIMITED: Evaluation shows no evidence of thrombus in the common femoral vein   Doppler evaluation shows a normal response to augmentation maneuvers  LEFT LOWER LIMB: No evidence of acute or chronic deep vein thrombosis No evidence of superficial thrombophlebitis noted  Doppler evaluation shows a normal response to augmentation maneuvers  Popliteal, posterior tibial and peroneal arterial Doppler waveforms are monophasic  Technical findings were given to Al Villatoro PA-C at 1400 1/30/2019  SIGNATURE: Electronically Signed by: Maribel Daniels on 2019-01-30 02:33:38 PM         Physical Exam   Constitutional: She is oriented to person, place, and time  She appears well-developed and well-nourished  Cardiovascular: Normal rate, regular rhythm, normal heart sounds and intact distal pulses  Pulmonary/Chest: Effort normal and breath sounds normal    Musculoskeletal:   Ambulates with cane   Neurological: She is alert and oriented to person, place, and time  She has normal reflexes  Psychiatric: She has a normal mood and affect   Her behavior is normal  Judgment and thought content normal

## 2019-02-04 NOTE — PATIENT INSTRUCTIONS
1  Lumbar radiculopathy- Start Prednisone  This medication will cause your blood sugars to temporarily rise  2  Spinal stenosis- Will be following up with Dr Ev Kathleen next month  3  Peripheral artery disease- Please make sure you keep your appointment with Dr Caroline Coombs  4  Hypertension- Not at goal  Just started Chlorthalidone, will be checking labs soon  Follow up with me as needed and with Dr Devan Vera as scheduled next month

## 2019-02-07 ENCOUNTER — PATIENT OUTREACH (OUTPATIENT)
Dept: INTERNAL MEDICINE CLINIC | Facility: CLINIC | Age: 74
End: 2019-02-07

## 2019-02-18 ENCOUNTER — PATIENT OUTREACH (OUTPATIENT)
Dept: INTERNAL MEDICINE CLINIC | Facility: CLINIC | Age: 74
End: 2019-02-18

## 2019-02-18 NOTE — PROGRESS NOTES
Attempted to contact patient's spouse home phone since patient's listed cell phone number is not in service  No answer on home phone  Will attempt again

## 2019-02-19 ENCOUNTER — EVALUATION (OUTPATIENT)
Dept: PHYSICAL THERAPY | Facility: CLINIC | Age: 74
End: 2019-02-19
Payer: COMMERCIAL

## 2019-02-19 DIAGNOSIS — M51.26 LUMBAR DISC HERNIATION: Primary | ICD-10-CM

## 2019-02-19 DIAGNOSIS — M79.605 PAIN OF LEFT LOWER EXTREMITY: ICD-10-CM

## 2019-02-19 DIAGNOSIS — M48.061 SPINAL STENOSIS OF LUMBAR REGION WITHOUT NEUROGENIC CLAUDICATION: ICD-10-CM

## 2019-02-19 PROCEDURE — 97162 PT EVAL MOD COMPLEX 30 MIN: CPT | Performed by: PHYSICAL THERAPIST

## 2019-02-19 PROCEDURE — 97110 THERAPEUTIC EXERCISES: CPT | Performed by: PHYSICAL THERAPIST

## 2019-02-19 NOTE — PROGRESS NOTES
PT Evaluation     Today's date: 2019  Patient name: Rach Fang  : [id number]  MRN: [id number]  Referring provider: Saroj Farley DO  Dx:   Encounter Diagnosis     ICD-10-CM    1  Lumbar disc herniation M51 26 Ambulatory referral to Physical Therapy   2  Spinal stenosis of lumbar region without neurogenic claudication M48 061 Ambulatory referral to Physical Therapy   3  Pain of left lower extremity M79 605 Ambulatory referral to Physical Therapy       Start Time: 1273  Stop Time: 1645  Total time in clinic (min): 70 minutes    Assessment  Assessment details: Pt is a 73 y/o female presenting to physical therapy with chief complaint of LBP after a fall at the beginning of January  She presents with apprehension to movement and gait, however this improved following session  Pt presented with a flexion bias, as repeated flexion improved her pain and her gait pattern whereas extension did not change her pain or her gait pattern  Manual correction of lateral shift attempted, although too painful for pt to tolerate  Pt would benefit from physical therapy in order to improve posture, improve AROM, improve strength, improve gait, and return to PLOF painfree  Impairments: abnormal gait, abnormal or restricted ROM, activity intolerance, impaired balance, impaired physical strength, lacks appropriate home exercise program, pain with function and poor posture   Functional limitations: ambulation, ADLs  Symptom irritability: highUnderstanding of Dx/Px/POC: good   Prognosis: good    Goals  ST weeks  1  Pt will demonstrate independence with HEP  2  Pt will improve lumbar AROM by 10%  3  Pt will improve BLE strength by at least 1/2 grade  4  Pt will report pain no more than 5/10  5  Pt will generate proper TA contraction without cuing to show improved NM control    LT weeks  1  Pt will improve lumbar AROM to at least 80% in all directions  2  Pt will report pain no more than 2/10  3   Pt will be able to stand/sit for at least 30 minutes without pain to return to PLOF  4  Pt will be able to lift at least 10lbs from floor to waist without pain  Plan  Patient would benefit from: skilled physical therapy  Planned modality interventions: cryotherapy and thermotherapy: hydrocollator packs  Planned therapy interventions: therapeutic exercise, therapeutic activities, stretching, strengthening, patient education, neuromuscular re-education, massage, manual therapy, balance, gait training and home exercise program  Frequency: 2x week  Duration in weeks: 4  Treatment plan discussed with: patient and family        Subjective Evaluation    History of Present Illness  Mechanism of injury: trauma  Mechanism of injury: Pt was walking her dog at the beginning of January and was pulled and fell forward into the bushes  Then about 2 days later she slipped on ice and fell on her back  Pt has had LBP for a while, but since she fell the front of the thigh is bothering her the most  She describes the pain as a burning pain  Pt is currently walking with a RW since her fall and because of her pain  Pt reports she cannot vacuum, dust, or any household chores  She took a tramadol which helped the pain  Pt reports she cannot sleep and has difficulty lying down             Not a recurrent problem   Quality of life: fair    Pain  Current pain ratin  At best pain ratin  At worst pain rating: 10  Quality: burning and radiating  Relieving factors: medications and heat  Aggravating factors: lifting, stair climbing, walking, standing and sitting  Progression: no change    Patient Goals  Patient goals for therapy: decreased pain, independence with ADLs/IADLs, return to sport/leisure activities and increased motion          Objective     Postural Observations  Seated posture: poor  Standing posture: poor    Additional Postural Observation Details  R lateral shift noted in standing    Active Range of Motion     Lumbar   Flexion: WFL  Extension:  with pain Restriction level: moderate  Left lateral flexion:  with pain Restriction level: moderate  Right lateral flexion:  with pain Restriction level: moderate    Additional Active Range of Motion Details  L SG painful    Passive Range of Motion     Lumbar   Left lateral flexion:  Restriction level: moderate  Right lateral flexion:  Restriction level: moderate    Strength/Myotome Testing     Left Hip   Planes of Motion   Flexion: 4    Right Hip   Planes of Motion   Flexion: 4+    Left Knee   Flexion: 4  Extension: 4    Right Knee   Flexion: 4+  Extension: 4+    Left Ankle/Foot   Dorsiflexion: 4    Right Ankle/Foot   Dorsiflexion: 4+    Ambulation     Observational Gait     Additional Observational Gait Details  Initially: pt ambulated with RW, forward flexed posture, and step to gait pattern with decreased stance time on the L  After session: pt ambulated with no AD, step through gait pattern although step/stride length remained limited, and upright posture      General Comments:      Lumbar Comments  No pain with DKTC manual    No pain with prone lying, increased with FAISAL  Hips shifted to the R with FAISAL, no increase in pain - press ups on elbows increased thigh pain    Femoral nerve tension (-)    Hip joint mobs painful    DKTC manual improved pain and gait - step/stride length  Seated lumbar flexion further improved gait      Flowsheet Rows      Most Recent Value   PT/OT G-Codes   Current Score  20   Projected Score  47   FOTO information reviewed  Yes          Precautions: HTN, hx stroke, DM, HLD    Daily Treatment Diary     Manual  2/19            Lateral shift correct 5'                                                                    Exercise Diary  2/19            Bike             DKTC with PB             SKTC             Seated PB rollouts             Seated lumbar flexion             Child's pose stretch             PPT             TA contraction             DLS ball press Modalities

## 2019-02-21 ENCOUNTER — APPOINTMENT (OUTPATIENT)
Dept: PHYSICAL THERAPY | Facility: CLINIC | Age: 74
End: 2019-02-21
Payer: COMMERCIAL

## 2019-02-25 ENCOUNTER — CONSULT (OUTPATIENT)
Dept: VASCULAR SURGERY | Facility: CLINIC | Age: 74
End: 2019-02-25
Payer: COMMERCIAL

## 2019-02-25 VITALS
WEIGHT: 152 LBS | HEART RATE: 84 BPM | SYSTOLIC BLOOD PRESSURE: 186 MMHG | RESPIRATION RATE: 20 BRPM | HEIGHT: 58 IN | BODY MASS INDEX: 31.91 KG/M2 | DIASTOLIC BLOOD PRESSURE: 78 MMHG

## 2019-02-25 DIAGNOSIS — E11.21 TYPE 2 DIABETES MELLITUS WITH DIABETIC NEPHROPATHY, WITHOUT LONG-TERM CURRENT USE OF INSULIN (HCC): Primary | ICD-10-CM

## 2019-02-25 DIAGNOSIS — I70.0 AORTOILIAC STENOSIS, LEFT (HCC): ICD-10-CM

## 2019-02-25 DIAGNOSIS — N18.30 CKD (CHRONIC KIDNEY DISEASE), STAGE III (HCC): ICD-10-CM

## 2019-02-25 DIAGNOSIS — I73.9 CLAUDICATION IN PERIPHERAL VASCULAR DISEASE (HCC): ICD-10-CM

## 2019-02-25 DIAGNOSIS — I63.9 CEREBROVASCULAR ACCIDENT (CVA), UNSPECIFIED MECHANISM (HCC): Primary | ICD-10-CM

## 2019-02-25 PROCEDURE — 3066F NEPHROPATHY DOC TX: CPT | Performed by: SURGERY

## 2019-02-25 PROCEDURE — 99203 OFFICE O/P NEW LOW 30 MIN: CPT | Performed by: SURGERY

## 2019-02-25 RX ORDER — TRAMADOL HYDROCHLORIDE 50 MG/1
50 TABLET ORAL EVERY 6 HOURS PRN
COMMUNITY
End: 2019-03-20 | Stop reason: SDUPTHER

## 2019-02-25 NOTE — LETTER
February 25, 2019     Nathan Irby DO  [id number] [address]    Patient: Jass Painter   YOB: 1945   Date of Visit: 2/25/2019       Dear Dr Darwin Adorno: Thank you for referring Mike Roldan to me for evaluation  Below are my notes for this consultation  If you have questions, please do not hesitate to call me  I look forward to following your patient along with you  Sincerely,        Jeffy Augustin MD        CC: MD Jeffy Anglin Cha, MD  2/25/2019 10:41 PM  Sign at close encounter  Assessment/Plan:    CKD (chronic kidney disease), stage III ([facility] )  As symptoms are improving with PT and there is significant CKD, I will recommend to hold of any intervention at this time  Claudication in peripheral vascular disease [facility] )  Left thigh claudicaiton due to long standing iliac stenosis  I reviewed recent LEAD and compared to LISA performed at [facility] in 2014  The LISA is unchanged from prior at 0 56  She has significant improvement in symptoms after PT  She will continued aspirin  F/U in 3 months     Repeat LEAD and AOIL in 6 months  Diagnoses and all orders for this visit:    Cerebrovascular accident (CVA), unspecified mechanism ([facility] )  -     VAS carotid complete study; Future    CKD (chronic kidney disease), stage III ([facility])    Claudication in peripheral vascular disease ([facility])    Aortoiliac stenosis, left ([facility])  -     VAS abdominal aorta/iliacs; complete study; Future    Other orders  -     traMADol (ULTRAM) 50 mg tablet; Take 50 mg by mouth every 6 (six) hours as needed for moderate pain          Subjective:      Patient ID: Jass Painter is a 72 y o  female  Long standing left thigh claudication  She was having testing every 6 months but the numbers were stable  Left thigh pain more with walking short distances  It is burning in nature  Initially it was all the time after the fall   she could barely walk after the fall when she took her dog for a walk few weeks ago  She had gone to ED at that time  But over last couple of weeks it has improved  She has also started PT with improvement in symptoms  She continues to smoke 1 pack per day or more  She takes plavix daily  The following portions of the patient's history were reviewed and updated as appropriate: allergies, current medications, past family history, past medical history, past social history, past surgical history and problem list     Review of Systems   Constitutional: Negative  HENT: Negative  Eyes: Negative  Respiratory: Negative  Cardiovascular: Negative  Negative for leg swelling  Gastrointestinal: Negative  Endocrine: Negative  Genitourinary: Negative  Musculoskeletal: Positive for gait problem  Leg pain   Skin: Negative  Allergic/Immunologic: Negative  Hematological: Negative  Psychiatric/Behavioral: Negative  I have reviewed the ROS as entered and made changes as necessary  Objective:      BP (!) 186/78 (BP Location: Left arm, Patient Position: Sitting)   Pulse 84   Resp 20   Ht 4' 10" (1 473 m)   Wt 68 9 kg (152 lb)   LMP  (LMP Unknown)   BMI 31 77 kg/m²           Physical Exam   Constitutional: She is oriented to person, place, and time  She appears well-developed and well-nourished  No distress  obese   HENT:   Head: Normocephalic and atraumatic  Right Ear: External ear normal    Left Ear: External ear normal    Nose: Nose normal    Mouth/Throat: No oropharyngeal exudate  Eyes: Pupils are equal, round, and reactive to light  Conjunctivae are normal  Right eye exhibits no discharge  Left eye exhibits no discharge  No scleral icterus  Neck: Normal range of motion  Neck supple  No tracheal deviation present  No thyromegaly present  Cardiovascular: Normal rate, regular rhythm, S1 normal and S2 normal  Exam reveals no gallop and no friction rub  Murmur heard    Pulses:       Femoral pulses are 2+ on the right side, and 1+ on the left side  No palp distal pulses  But she has biphasic PT signals daily  Pulmonary/Chest: Effort normal and breath sounds normal  No respiratory distress  She has no wheezes  She has no rales  Abdominal: Soft  Bowel sounds are normal  She exhibits no distension, no abdominal bruit and no pulsatile midline mass  There is no tenderness  Musculoskeletal: Normal range of motion  She exhibits tenderness (left thigh)  She exhibits no edema or deformity  Neurological: She is alert and oriented to person, place, and time  Skin: Skin is warm and dry  She is not diaphoretic  Extensive bilateral varicosities scattered along bilateral legs  Psychiatric: She has a normal mood and affect  Her behavior is normal    Nursing note and vitals reviewed

## 2019-02-25 NOTE — PROGRESS NOTES
Assessment/Plan:    CKD (chronic kidney disease), stage III ([facility])  As symptoms are improving with PT and there is significant CKD, I will recommend to hold of any intervention at this time  Claudication in peripheral vascular disease ([facility] )  Left thigh claudicaiton due to long standing iliac stenosis  I reviewed recent LEAD and compared to LISA performed at [facility] in 2014  The LISA is unchanged from prior at 0 56  She has significant improvement in symptoms after PT  She will continued aspirin  F/U in 3 months     Repeat LEAD and AOIL in 6 months  Diagnoses and all orders for this visit:    Cerebrovascular accident (CVA), unspecified mechanism ([facility] )  -     VAS carotid complete study; Future    CKD (chronic kidney disease), stage III ([facility])    Claudication in peripheral vascular disease ([facility])    Aortoiliac stenosis, left ([facility])  -     VAS abdominal aorta/iliacs; complete study; Future    Other orders  -     traMADol (ULTRAM) 50 mg tablet; Take 50 mg by mouth every 6 (six) hours as needed for moderate pain          Subjective:      Patient ID: Rogers Multani is a 72 y o  female  Long standing left thigh claudication  She was having testing every 6 months but the numbers were stable  Left thigh pain more with walking short distances  It is burning in nature  Initially it was all the time after the fall  she could barely walk after the fall when she took her dog for a walk few weeks ago  She had gone to ED at that time  But over last couple of weeks it has improved  She has also started PT with improvement in symptoms  She continues to smoke 1 pack per day or more  She takes plavix daily  The following portions of the patient's history were reviewed and updated as appropriate: allergies, current medications, past family history, past medical history, past social history, past surgical history and problem list     Review of Systems   Constitutional: Negative  HENT: Negative      Eyes: Negative  Respiratory: Negative  Cardiovascular: Negative  Negative for leg swelling  Gastrointestinal: Negative  Endocrine: Negative  Genitourinary: Negative  Musculoskeletal: Positive for gait problem  Leg pain   Skin: Negative  Allergic/Immunologic: Negative  Hematological: Negative  Psychiatric/Behavioral: Negative  I have reviewed the ROS as entered and made changes as necessary  Objective:      BP (!) 186/78 (BP Location: Left arm, Patient Position: Sitting)   Pulse 84   Resp 20   Ht 4' 10" (1 473 m)   Wt 68 9 kg (152 lb)   LMP  (LMP Unknown)   BMI 31 77 kg/m²          Physical Exam   Constitutional: She is oriented to person, place, and time  She appears well-developed and well-nourished  No distress  obese   HENT:   Head: Normocephalic and atraumatic  Right Ear: External ear normal    Left Ear: External ear normal    Nose: Nose normal    Mouth/Throat: No oropharyngeal exudate  Eyes: Pupils are equal, round, and reactive to light  Conjunctivae are normal  Right eye exhibits no discharge  Left eye exhibits no discharge  No scleral icterus  Neck: Normal range of motion  Neck supple  No tracheal deviation present  No thyromegaly present  Cardiovascular: Normal rate, regular rhythm, S1 normal and S2 normal  Exam reveals no gallop and no friction rub  Murmur heard  Pulses:       Femoral pulses are 2+ on the right side, and 1+ on the left side  No palp distal pulses  But she has biphasic PT signals daily  Pulmonary/Chest: Effort normal and breath sounds normal  No respiratory distress  She has no wheezes  She has no rales  Abdominal: Soft  Bowel sounds are normal  She exhibits no distension, no abdominal bruit and no pulsatile midline mass  There is no tenderness  Musculoskeletal: Normal range of motion  She exhibits tenderness (left thigh)  She exhibits no edema or deformity  Neurological: She is alert and oriented to person, place, and time  Skin: Skin is warm and dry  She is not diaphoretic  Extensive bilateral varicosities scattered along bilateral legs  Psychiatric: She has a normal mood and affect  Her behavior is normal    Nursing note and vitals reviewed

## 2019-02-26 ENCOUNTER — OFFICE VISIT (OUTPATIENT)
Dept: PHYSICAL THERAPY | Facility: CLINIC | Age: 74
End: 2019-02-26
Payer: COMMERCIAL

## 2019-02-26 ENCOUNTER — PATIENT OUTREACH (OUTPATIENT)
Dept: INTERNAL MEDICINE CLINIC | Facility: CLINIC | Age: 74
End: 2019-02-26

## 2019-02-26 DIAGNOSIS — M79.605 PAIN OF LEFT LOWER EXTREMITY: ICD-10-CM

## 2019-02-26 DIAGNOSIS — M48.061 SPINAL STENOSIS OF LUMBAR REGION WITHOUT NEUROGENIC CLAUDICATION: Primary | ICD-10-CM

## 2019-02-26 DIAGNOSIS — M51.26 LUMBAR DISC HERNIATION: ICD-10-CM

## 2019-02-26 PROCEDURE — 97110 THERAPEUTIC EXERCISES: CPT

## 2019-02-26 NOTE — ASSESSMENT & PLAN NOTE
As symptoms are improving with PT and there is significant CKD, I will recommend to hold of any intervention at this time

## 2019-02-26 NOTE — ASSESSMENT & PLAN NOTE
Left thigh claudicaiton due to long standing iliac stenosis  I reviewed recent LEAD and compared to LISA performed at [facility] in 2014  The LISA is unchanged from prior at 0 56  She has significant improvement in symptoms after PT  She will continued aspirin  F/U in 3 months     Repeat LEAD and AOIL in 6 months

## 2019-02-26 NOTE — PROGRESS NOTES
Daily Note     Today's date: 2019  Patient name: Merrill Morgan  :   MRN: [id number]  Referring provider: Akshat Polanco DO  Dx:   Encounter Diagnosis     ICD-10-CM    1  Spinal stenosis of lumbar region without neurogenic claudication M48 061    2  Pain of left lower extremity M79 605    3  Lumbar disc herniation M51 26                   Subjective: Upon presentation patient repots burning pain in anterior portion of the L thigh  Denies back pain  Objective: See treatment diary below  Precautions: HTN, hx stroke, DM, HLD    Daily Treatment Diary     Manual             Lateral shift correct 5' np                                                                   Exercise Diary             Bike  8'           DKTC with PB  5"x20           SKTC  5"x20           Seated PB rollouts  5"x 20           Seated lumbar flexion  5" x20           Child's pose stretch  30'"x3           PPT  5" x10           TA contraction  5" x20           DLS ball press  8"N47                                                                                                                                                              Modalities                                                         Assessment: Patient demonstrating positive response to flexion based exercises  Pain reported in L glute following PPT, pain abolished with seated flexion and and pball roll out  Patient demonstrates good tolerance to TE, verbal cuing utilized to facilitate proper form and dosage  Patient denies pain and offers no complaints post session  Plan: Cont per POC

## 2019-02-27 LAB
ALBUMIN SERPL ELPH-MCNC: 3.1 G/DL (ref 2.9–4.4)
ALBUMIN/CREAT UR: 546.1 MG/G CREAT (ref 0–30)
ALBUMIN/GLOB SERPL: 1 {RATIO} (ref 0.7–1.7)
ALPHA1 GLOB SERPL ELPH-MCNC: 0.3 G/DL (ref 0–0.4)
ALPHA2 GLOB SERPL ELPH-MCNC: 1.1 G/DL (ref 0.4–1)
B-GLOBULIN SERPL ELPH-MCNC: 0.9 G/DL (ref 0.7–1.3)
CREAT UR-MCNC: 92.9 MG/DL
GAMMA GLOB SERPL ELPH-MCNC: 0.9 G/DL (ref 0.4–1.8)
GLOBULIN SER CALC-MCNC: 3.1 G/DL (ref 2.2–3.9)
LABORATORY COMMENT REPORT: ABNORMAL
M PROTEIN SERPL ELPH-MCNC: ABNORMAL G/DL
MICROALBUMIN UR-MCNC: 507.3 UG/ML
PROT SERPL-MCNC: 6.2 G/DL (ref 6–8.5)
TSH SERPL DL<=0.005 MIU/L-ACNC: 2.59 UIU/ML (ref 0.45–4.5)
URATE SERPL-MCNC: 7.5 MG/DL (ref 2.5–7.1)
VIT B12 SERPL-MCNC: 502 PG/ML (ref 232–1245)

## 2019-02-28 ENCOUNTER — TELEPHONE (OUTPATIENT)
Dept: INTERNAL MEDICINE CLINIC | Facility: CLINIC | Age: 74
End: 2019-02-28

## 2019-02-28 NOTE — TELEPHONE ENCOUNTER
----- Message from Jocy Penny DO sent at 2/28/2019  7:49 AM EST -----  Please call patient her protein in her urine is elevated with her diabetes I would like her to see Dr Felicia Perez, referral given

## 2019-03-01 ENCOUNTER — OFFICE VISIT (OUTPATIENT)
Dept: PHYSICAL THERAPY | Facility: CLINIC | Age: 74
End: 2019-03-01
Payer: COMMERCIAL

## 2019-03-01 DIAGNOSIS — M79.605 PAIN OF LEFT LOWER EXTREMITY: ICD-10-CM

## 2019-03-01 DIAGNOSIS — M51.26 LUMBAR DISC HERNIATION: ICD-10-CM

## 2019-03-01 DIAGNOSIS — M48.061 SPINAL STENOSIS OF LUMBAR REGION WITHOUT NEUROGENIC CLAUDICATION: Primary | ICD-10-CM

## 2019-03-01 PROCEDURE — 97110 THERAPEUTIC EXERCISES: CPT | Performed by: PHYSICAL THERAPIST

## 2019-03-01 PROCEDURE — 97112 NEUROMUSCULAR REEDUCATION: CPT | Performed by: PHYSICAL THERAPIST

## 2019-03-01 NOTE — PROGRESS NOTES
Daily Note     Today's date: 3/1/2019  Patient name: Benjy Catherine  : [birth year]  MRN: [id number]  Referring provider: Daniele Varela DO  Dx:   Encounter Diagnosis     ICD-10-CM    1  Spinal stenosis of lumbar region without neurogenic claudication M48 061    2  Pain of left lower extremity M79 605    3  Lumbar disc herniation M51 26        Start Time: 8587  Stop Time: 1100  Total time in clinic (min): 62 minutes    Subjective: Pt offers no new complaints today  Objective: See treatment diary below  Precautions: HTN, hx stroke, DM, HLD     Daily Treatment Diary      Manual  2/19 2/26 3/1                 Lateral shift correct 5' np np                                                                                                                       Exercise Diary  2/19 2/26 3/1                 Bike   8' 10'                 DKTC with PB   5"x20 10"x15                 SKTC   5"x20 10"x10                 Seated PB rollouts   5"x 20 20x                 Seated lumbar flexion   5" x20 5"x20                 Child's pose stretch   30'"x3 30"x3                 PPT   5" x10 5"x20                 TA contraction   5" x20 np                 DLS ball press   4"D76 5"x20                 STS     2x10                 Piriformis stretch     30"x3                 Figure 4 stretch     30"x3                                                                                                                                                                                                                       Modalities                                                                                                    Assessment: Pt able to tolerate new stretches without an increase in pain  Minimal increase in B quad soreness following session secondary to addition of STS  Improved LBP following visit  Plan: Progress as tolerated

## 2019-03-04 ENCOUNTER — APPOINTMENT (OUTPATIENT)
Dept: PHYSICAL THERAPY | Facility: CLINIC | Age: 74
End: 2019-03-04
Payer: COMMERCIAL

## 2019-03-05 ENCOUNTER — PATIENT OUTREACH (OUTPATIENT)
Dept: INTERNAL MEDICINE CLINIC | Facility: CLINIC | Age: 74
End: 2019-03-05

## 2019-03-06 ENCOUNTER — OFFICE VISIT (OUTPATIENT)
Dept: PHYSICAL THERAPY | Facility: CLINIC | Age: 74
End: 2019-03-06
Payer: COMMERCIAL

## 2019-03-06 DIAGNOSIS — M48.061 SPINAL STENOSIS OF LUMBAR REGION WITHOUT NEUROGENIC CLAUDICATION: Primary | ICD-10-CM

## 2019-03-06 DIAGNOSIS — M79.605 PAIN OF LEFT LOWER EXTREMITY: ICD-10-CM

## 2019-03-06 DIAGNOSIS — M51.26 LUMBAR DISC HERNIATION: ICD-10-CM

## 2019-03-06 PROCEDURE — 97112 NEUROMUSCULAR REEDUCATION: CPT

## 2019-03-06 PROCEDURE — 97110 THERAPEUTIC EXERCISES: CPT

## 2019-03-06 NOTE — PROGRESS NOTES
Daily Note     Today's date: 3/6/2019  Patient name: Veronica Mcnamara  : [birth year]  MRN: [id number]  Referring provider: Jocy Henao DO  Dx:   Encounter Diagnosis     ICD-10-CM    1  Spinal stenosis of lumbar region without neurogenic claudication M48 061    2  Pain of left lower extremity M79 605    3  Lumbar disc herniation M51 26        Start Time: 1028  Stop Time: 1126  Total time in clinic (min): 58 minutes    Subjective: Upon arrival to clinic, Patient reported feeling fine in L/s  Patient stated she has moderate discomfort in her anterior aspect of her L quad and L knee        Objective: See treatment diary below  Precautions: HTN, hx stroke, DM, HLD     Daily Treatment Diary      Manual  2/19 2/26 3/1  3               Lateral shift correct 5' np np  np                                                                                                                     Exercise Diary  2/19 2/26 3/1  3/6               Bike   8' 10'  10'               DKTC with PB   5"x20 10"x15  d/c               SKTC   5"x20 10"x10  d/c               Seated PB rollouts   5"x 20 20x  d/c               Seated lumbar flexion   5" x20 5"x20  d/c               Child's pose stretch   30'"x3 30"x3  d/c               PPT   5" x10 5"x20  d/c               TA contraction   5" x20 np  5"x20               DLS ball press   9"B00 5"x20  5"x20               STS     2x10  2x10               Piriformis stretch     30"x3  d/c               Figure 4 stretch     30"x3  30"x3                prone lying with pillows under abdomen        3'                prone lying        3'                FAISAL        2'                PPT        x10                bridges        x20                DLS row/ext        RTB x20                paloff press        RTBx20               Lateral walking    RTB 4 laps          standing three way        nv                     Modalities                                                                                                      Assessment: Patient showed positive response to all prone lying exercises, decreased pain symptoms in LLE during ambulation  Patient educated to perform extension based exercsies at home, stop if adverse sx occur- vc demonstrated  Patient challenged with eccentric quad control during STS  Good tolerance to progression of TE program noted  Patient denies pain post session  Patient would benefit from continued therapy to improve L/s and LLE reduction in pain symptoms to return to PLOF  Plan: Continue per plan of care

## 2019-03-07 ENCOUNTER — TELEPHONE (OUTPATIENT)
Dept: NEPHROLOGY | Facility: CLINIC | Age: 74
End: 2019-03-07

## 2019-03-07 ENCOUNTER — OFFICE VISIT (OUTPATIENT)
Dept: INTERNAL MEDICINE CLINIC | Facility: CLINIC | Age: 74
End: 2019-03-07
Payer: COMMERCIAL

## 2019-03-07 VITALS
HEART RATE: 89 BPM | HEIGHT: 58 IN | TEMPERATURE: 98.1 F | OXYGEN SATURATION: 98 % | WEIGHT: 148.4 LBS | DIASTOLIC BLOOD PRESSURE: 80 MMHG | BODY MASS INDEX: 31.15 KG/M2 | RESPIRATION RATE: 18 BRPM | SYSTOLIC BLOOD PRESSURE: 170 MMHG

## 2019-03-07 DIAGNOSIS — E87.6 HYPOKALEMIA: ICD-10-CM

## 2019-03-07 DIAGNOSIS — M51.26 LUMBAR DISC HERNIATION: ICD-10-CM

## 2019-03-07 DIAGNOSIS — M47.816 SPONDYLOSIS OF LUMBAR REGION WITHOUT MYELOPATHY OR RADICULOPATHY: Primary | ICD-10-CM

## 2019-03-07 DIAGNOSIS — I10 HYPERTENSION, UNSPECIFIED TYPE: ICD-10-CM

## 2019-03-07 DIAGNOSIS — I70.1 RENAL ARTERY STENOSIS (HCC): ICD-10-CM

## 2019-03-07 PROCEDURE — 99214 OFFICE O/P EST MOD 30 MIN: CPT | Performed by: INTERNAL MEDICINE

## 2019-03-07 RX ORDER — GABAPENTIN 300 MG/1
300 CAPSULE ORAL
Qty: 30 CAPSULE | Refills: 2 | Status: SHIPPED | OUTPATIENT
Start: 2019-03-07 | End: 2019-03-20 | Stop reason: SDDI

## 2019-03-07 RX ORDER — POTASSIUM CHLORIDE 750 MG/1
10 CAPSULE, EXTENDED RELEASE ORAL 2 TIMES DAILY
Qty: 90 CAPSULE | Refills: 2 | Status: SHIPPED | OUTPATIENT
Start: 2019-03-07 | End: 2019-04-02

## 2019-03-07 NOTE — PROGRESS NOTES
Assessment/Plan: see 3/20/19    1 patient with left upper thigh burning probably related to lumbar radiculopathy probably left L2 gabapentin order 300 mg to take at night to see if that decreases her pain will continue physical therapy  2  Hypertension difficult to control continued follow-up with Nephrology Dr Marlin Severino patient had seen Nephrology her potassium was low was told to decrease her chlorthalidone bite breaking in half which is been unable to do, will Rx potassium take 1 a day check laboratory work in 1 week  3  Hypokalemia as per history              There are no diagnoses linked to this encounter  The patient was counseled regarding instructions for management, risk factor reductions, patient and family education,impressions, risks and benefits of treatment options, side effects of medications, importance of compliance with treatment  The treatment plan was reviewed with the patient/guardian and patient/guardian understands and agrees with the treatment plan              Current Outpatient Medications:     amLODIPine (NORVASC) 5 mg tablet, Take 5 mg by mouth 2 (two) times a day, Disp: , Rfl:     chlorthalidone 25 mg tablet, Take 1 tablet (25 mg total) by mouth daily, Disp: 90 tablet, Rfl: 2    Cholecalciferol (VITAMIN D3) 2000 units capsule, Take 1 capsule by mouth every other day  , Disp: , Rfl:     clopidogrel (PLAVIX) 75 mg tablet, Take 1 tablet by mouth daily, Disp: , Rfl:     ferrous sulfate 325 (65 Fe) mg tablet, Take 325 mg by mouth daily, Disp: , Rfl:     folic acid (FOLVITE) 1 mg tablet, Take 1 mg by mouth daily, Disp: , Rfl:     losartan (COZAAR) 100 MG tablet, Take 100 mg by mouth daily, Disp: , Rfl:     losartan (COZAAR) 50 mg tablet, Take 1 tablet by mouth daily  , Disp: , Rfl:     Omega-3 Fatty Acids (SM FISH OIL) 1200 MG CAPS, Take by mouth 2 (two) times a day, Disp: , Rfl:     traMADol (ULTRAM) 50 mg tablet, Take 50 mg by mouth every 6 (six) hours as needed for moderate pain, Disp: , Rfl:     Subjective:      Patient ID: Porsche Bronson is a 72 y o  female  Left leg burning, has follow up with [facility]  2 months back much improved pain level 6      The following portions of the patient's history were reviewed and updated as appropriate:   She has a past medical history of Arthritis, Coronary artery disease, Diabetes mellitus ([facility] [address] ), Endometriosis, High cholesterol, Hypertension, Kidney disease, chronic, stage III (moderate, EGFR 30-59 ml/min) ([facility]), Lumbar disc herniation, Peripheral vascular disease ([facility] ), Shoulder injury, Spinal stenosis, and Stroke ([facility] )  ,  does not have any pertinent problems on file  ,   has a past surgical history that includes Rotator cuff repair (Left) and Tonsillectomy  ,  family history includes Arthritis in her brother; Heart defect in her father; Heart disease in her mother; Hyperlipidemia in her mother; Hypertension in her father and mother; Other in her brother; Stroke in her sister  ,   reports that she has been smoking cigarettes  She has been smoking about 1 00 pack per day  She has never used smokeless tobacco  She reports that she does not drink alcohol or use drugs  ,  is allergic to atorvastatin; colesevelam; colestipol; ezetimibe; fenofibrate; pollen extract; rosuvastatin; and statins       Review of Systems   Constitutional: Negative for appetite change, chills, fatigue, fever and unexpected weight change  HENT: Negative for congestion, ear pain, facial swelling, hearing loss, mouth sores, nosebleeds, postnasal drip, rhinorrhea, sinus pain, sore throat, trouble swallowing and voice change  Eyes: Negative for pain, discharge, redness and visual disturbance  Respiratory: Negative for apnea, chest tightness, shortness of breath, wheezing and stridor  Cardiovascular: Negative for chest pain, palpitations and leg swelling  Gastrointestinal: Negative for abdominal distention, abdominal pain, blood in stool, constipation, diarrhea and vomiting  Endocrine: Negative for cold intolerance, heat intolerance, polydipsia, polyphagia and polyuria  Genitourinary: Negative for difficulty urinating, dysuria, flank pain, frequency, genital sores, hematuria and urgency  Musculoskeletal: Negative for arthralgias and back pain  Skin: Negative for rash and wound  Allergic/Immunologic: Negative for environmental allergies, food allergies and immunocompromised state  Neurological: Negative for dizziness, tremors, seizures, syncope, facial asymmetry, speech difficulty, weakness, light-headedness, numbness and headaches  Hematological: Negative for adenopathy  Does not bruise/bleed easily  Psychiatric/Behavioral: Negative for agitation, behavioral problems, dysphoric mood, hallucinations, self-injury, sleep disturbance and suicidal ideas  The patient is not hyperactive  Objective:  Pulse 89   Temp 98 1 °F (36 7 °C) (Tympanic)   Resp 18   Ht 4' 10" (1 473 m)   Wt 67 3 kg (148 lb 6 4 oz)   LMP  (LMP Unknown)   SpO2 98%   BMI 31 02 kg/m²     Lab Review  Orders Only on 02/25/2019   Component Date Value    Protein, Total 02/25/2019 6 2     Albumin, Electrophoresis 02/25/2019 3 1     Alpha-1 Globulin 02/25/2019 0 3     Alpha-2 Globulin 02/25/2019 1 1*    Beta Globulin 02/25/2019 0 9     Gamma Globulin 02/25/2019 0 9     M-León 02/25/2019 Not Observed     Globulin 02/25/2019 3 1     Albumin/Globulin Ratio 02/25/2019 1 0     Please note 02/25/2019 Comment     Creatinine, Urine 02/25/2019 92 9     Microalbum  ,U,Random 02/25/2019 507 3     Microalb/Creat Ratio 02/25/2019 546 1*    TSH 02/25/2019 2 590     Uric Acid 02/25/2019 7 5*    Vitamin B-12 02/25/2019 502          Imaging  @[id number]@     No orders to display     No results found for this or any previous visit  Physical Exam   Constitutional: She is oriented to person, place, and time  She appears well-developed     HENT:   Right Ear: External ear normal    Left Ear: External ear normal    Eyes: Right eye exhibits no discharge  Left eye exhibits no discharge  No scleral icterus  Neck: Carotid bruit is not present  No tracheal deviation present  No thyroid mass and no thyromegaly present  Cardiovascular: Normal rate, regular rhythm, normal heart sounds and intact distal pulses  Exam reveals no gallop and no friction rub  No murmur heard  Pulmonary/Chest: No respiratory distress  She has no wheezes  She has no rales  Musculoskeletal: She exhibits no edema  No pain over the greater trochanters bilaterally   Lymphadenopathy:     She has no cervical adenopathy  Neurological: She is alert and oriented to person, place, and time  Coordination normal    Straight leg raising 45° bilaterally with some pain anterior left thigh   Psychiatric: She has a normal mood and affect  Her behavior is normal  Judgment and thought content normal    Nursing note and vitals reviewed

## 2019-03-07 NOTE — TELEPHONE ENCOUNTER
Vee from Dr Nicolasa Crawford office called stating that patient is already seeing a Nephrologist (Dr Calixto Reyes) and does not want to change

## 2019-03-07 NOTE — LETTER
March 7, 2019     Lindy Wen MD  [place] [address]    Patient: Parul Flores   YOB: 1945   Date of Visit: 3/7/2019       Dear Dr Leeanne Kaur:    Thank you for referring Steph Bass to me for evaluation  Below are my notes for this consultation  If you have questions, please do not hesitate to call me  I look forward to following your patient along with you  Sincerely,        Danny Lima DO        CC: MD Danny Amato DO  3/7/2019 12:14 PM  Sign at close encounter  Assessment/Plan: see 3/20/19    1 patient with left upper thigh burning probably related to lumbar radiculopathy probably left L2 gabapentin order 300 mg to take at night to see if that decreases her pain will continue physical therapy  2  Hypertension difficult to control continued follow-up with Nephrology Dr Cecily Closs patient had seen Nephrology her potassium was low was told to decrease her chlorthalidone bite breaking in half which is been unable to do, will Rx potassium take 1 a day check laboratory work in 1 week  3  Hypokalemia as per history              There are no diagnoses linked to this encounter  The patient was counseled regarding instructions for management, risk factor reductions, patient and family education,impressions, risks and benefits of treatment options, side effects of medications, importance of compliance with treatment  The treatment plan was reviewed with the patient/guardian and patient/guardian understands and agrees with the treatment plan              Current Outpatient Medications:     amLODIPine (NORVASC) 5 mg tablet, Take 5 mg by mouth 2 (two) times a day, Disp: , Rfl:     chlorthalidone 25 mg tablet, Take 1 tablet (25 mg total) by mouth daily, Disp: 90 tablet, Rfl: 2    Cholecalciferol (VITAMIN D3) 2000 units capsule, Take 1 capsule by mouth every other day  , Disp: , Rfl:     clopidogrel (PLAVIX) 75 mg tablet, Take 1 tablet by mouth daily, Disp: , Rfl:     ferrous sulfate 325 (65 Fe) mg tablet, Take 325 mg by mouth daily, Disp: , Rfl:     folic acid (FOLVITE) 1 mg tablet, Take 1 mg by mouth daily, Disp: , Rfl:     losartan (COZAAR) 100 MG tablet, Take 100 mg by mouth daily, Disp: , Rfl:     losartan (COZAAR) 50 mg tablet, Take 1 tablet by mouth daily  , Disp: , Rfl:     Omega-3 Fatty Acids (SM FISH OIL) 1200 MG CAPS, Take by mouth 2 (two) times a day, Disp: , Rfl:     traMADol (ULTRAM) 50 mg tablet, Take 50 mg by mouth every 6 (six) hours as needed for moderate pain, Disp: , Rfl:     Subjective:      Patient ID: Laurel Givens is a 72 y o  female  Left leg burning, has follow up with [facility]  2 months back much improved pain level 6      The following portions of the patient's history were reviewed and updated as appropriate:   She has a past medical history of Arthritis, Coronary artery disease, Diabetes mellitus ([facility] [address] ), Endometriosis, High cholesterol, Hypertension, Kidney disease, chronic, stage III (moderate, EGFR 30-59 ml/min) ([facility]), Lumbar disc herniation, Peripheral vascular disease ([address] ), Shoulder injury, Spinal stenosis, and Stroke ([facility] [address] )  ,  does not have any pertinent problems on file  ,   has a past surgical history that includes Rotator cuff repair (Left) and Tonsillectomy  ,  family history includes Arthritis in her brother; Heart defect in her father; Heart disease in her mother; Hyperlipidemia in her mother; Hypertension in her father and mother; Other in her brother; Stroke in her sister  ,   reports that she has been smoking cigarettes  She has been smoking about 1 00 pack per day  She has never used smokeless tobacco  She reports that she does not drink alcohol or use drugs  ,  is allergic to atorvastatin; colesevelam; colestipol; ezetimibe; fenofibrate; pollen extract; rosuvastatin; and statins       Review of Systems   Constitutional: Negative for appetite change, chills, fatigue, fever and unexpected weight change  HENT: Negative for congestion, ear pain, facial swelling, hearing loss, mouth sores, nosebleeds, postnasal drip, rhinorrhea, sinus pain, sore throat, trouble swallowing and voice change  Eyes: Negative for pain, discharge, redness and visual disturbance  Respiratory: Negative for apnea, chest tightness, shortness of breath, wheezing and stridor  Cardiovascular: Negative for chest pain, palpitations and leg swelling  Gastrointestinal: Negative for abdominal distention, abdominal pain, blood in stool, constipation, diarrhea and vomiting  Endocrine: Negative for cold intolerance, heat intolerance, polydipsia, polyphagia and polyuria  Genitourinary: Negative for difficulty urinating, dysuria, flank pain, frequency, genital sores, hematuria and urgency  Musculoskeletal: Negative for arthralgias and back pain  Skin: Negative for rash and wound  Allergic/Immunologic: Negative for environmental allergies, food allergies and immunocompromised state  Neurological: Negative for dizziness, tremors, seizures, syncope, facial asymmetry, speech difficulty, weakness, light-headedness, numbness and headaches  Hematological: Negative for adenopathy  Does not bruise/bleed easily  Psychiatric/Behavioral: Negative for agitation, behavioral problems, dysphoric mood, hallucinations, self-injury, sleep disturbance and suicidal ideas  The patient is not hyperactive            Objective:  Pulse 89   Temp 98 1 °F (36 7 °C) (Tympanic)   Resp 18   Ht 4' 10" (1 473 m)   Wt 67 3 kg (148 lb 6 4 oz)   LMP  (LMP Unknown)   SpO2 98%   BMI 31 02 kg/m²      Lab Review  Orders Only on 02/25/2019   Component Date Value    Protein, Total 02/25/2019 6 2     Albumin, Electrophoresis 02/25/2019 3 1     Alpha-1 Globulin 02/25/2019 0 3     Alpha-2 Globulin 02/25/2019 1 1*    Beta Globulin 02/25/2019 0 9     Gamma Globulin 02/25/2019 0 9     M-León 02/25/2019 Not Observed     Globulin 02/25/2019 3 1     Albumin/Globulin Ratio 02/25/2019 1 0     Please note 02/25/2019 Comment     Creatinine, Urine 02/25/2019 92 9     Microalbum  ,U,Random 02/25/2019 507 3     Microalb/Creat Ratio 02/25/2019 546 1*    TSH 02/25/2019 2 590     Uric Acid 02/25/2019 7 5*    Vitamin B-12 02/25/2019 502          Imaging  @[id number]@     No orders to display     No results found for this or any previous visit  Physical Exam   Constitutional: She is oriented to person, place, and time  She appears well-developed  HENT:   Right Ear: External ear normal    Left Ear: External ear normal    Eyes: Right eye exhibits no discharge  Left eye exhibits no discharge  No scleral icterus  Neck: Carotid bruit is not present  No tracheal deviation present  No thyroid mass and no thyromegaly present  Cardiovascular: Normal rate, regular rhythm, normal heart sounds and intact distal pulses  Exam reveals no gallop and no friction rub  No murmur heard  Pulmonary/Chest: No respiratory distress  She has no wheezes  She has no rales  Musculoskeletal: She exhibits no edema  No pain over the greater trochanters bilaterally   Lymphadenopathy:     She has no cervical adenopathy  Neurological: She is alert and oriented to person, place, and time  Coordination normal    Straight leg raising 45° bilaterally with some pain anterior left thigh   Psychiatric: She has a normal mood and affect  Her behavior is normal  Judgment and thought content normal    Nursing note and vitals reviewed

## 2019-03-11 ENCOUNTER — OFFICE VISIT (OUTPATIENT)
Dept: PHYSICAL THERAPY | Facility: CLINIC | Age: 74
End: 2019-03-11
Payer: COMMERCIAL

## 2019-03-11 DIAGNOSIS — M79.605 PAIN OF LEFT LOWER EXTREMITY: ICD-10-CM

## 2019-03-11 DIAGNOSIS — M48.061 SPINAL STENOSIS OF LUMBAR REGION WITHOUT NEUROGENIC CLAUDICATION: Primary | ICD-10-CM

## 2019-03-11 DIAGNOSIS — M51.26 LUMBAR DISC HERNIATION: ICD-10-CM

## 2019-03-11 PROCEDURE — 97112 NEUROMUSCULAR REEDUCATION: CPT | Performed by: PHYSICAL THERAPIST

## 2019-03-11 NOTE — PROGRESS NOTES
Daily Note     Today's date: 3/11/2019  Patient name: Marian Johnson  : [id number]  MRN: [id number]  Referring provider: Kathleen Hernandez DO  Dx:   Encounter Diagnosis     ICD-10-CM    1  Spinal stenosis of lumbar region without neurogenic claudication M48 061    2  Pain of left lower extremity M79 605    3  Lumbar disc herniation M51 26                   Subjective: Pt presents today noting that since Friday she has had signficant anterior thigh pain  She notes that the pain is so high that she is having trouble walking  She saw her MD who believes it is a "pinched nerve" and she is getting an MRI due to this  She feels that this was due to the side stepping exercise added last time  She denies any B&B changes or red flag symptoms  Objective: See treatment diary below      Assessment: Tolerated treatment well  Patient would benefit from continued PT and demonstrated a significant reduction in her symptoms post treatment  She began treatment with a 9/10 pain in her anterior thigh  AFter perofmring flexion based TE/NRE, she reported a 3/10  She was educated at length regarding SKC/DKC exercises for HEP and to avoid all other exercises until NV  Plan: Continue per plan of care  Assess response to today's visit         Precautions: HTN, hx stroke, DM, HLD     Daily Treatment Diary      Manual  2/19 2/26 3/1  3/6               Lateral shift correct 5' np np  np                                                                                                                     Exercise Diary  2/19 2/26 3/1  3/6  3/11             Bike   8' 10'  10'  held             DKTC with PB   5"x20 10"x15  d/c  10"x10 5 rounds             SKTC   5"x20 10"x10  d/c  10"x10             Seated PB rollouts   5"x 20 20x  d/c  10"x10             Seated lumbar flexion   5" x20 5"x20  d/c 10"x10             Child's pose stretch   30'"x3 30"x3  d/c  20"x4             PPT   5" x10 5"x20  d/c               TA contraction   5" x20 np  5"x20  held             DLS ball press   0"J92 5"x20  5"x20  held             STS     2x10  2x10  held             Piriformis stretch     30"x3  d/c               Figure 4 stretch     30"x3  30"x3  held              prone lying with pillows under abdomen        3'  held              prone lying        3'  held              FAISAL        2'  held              PPT        x10  held              bridges        x20  held              DLS row/ext        RTB x20  held              paloff press        RTBx20  held             Lateral walking    RTB 4 laps held         standing three way        nv  held                   Modalities

## 2019-03-12 ENCOUNTER — PATIENT OUTREACH (OUTPATIENT)
Dept: INTERNAL MEDICINE CLINIC | Facility: CLINIC | Age: 74
End: 2019-03-12

## 2019-03-12 NOTE — PROGRESS NOTES
Jay Nelson extremely pleasant and in good spirits  She stated that she lives with her  and has good emotional support with him as well as her adult son  Stated that she has PT for strengthening and it is going well  Is independent with ADLs  Does not utilize a cane or a walker  Introduced her to care management  She is in agreement  Stated that her blood sugars have been stable but blood pressure does fluctuate  She stated that its not a concern that it gets high  She stated that it runs in her family  Did discuss her meal choices with her  She stated that her goal is to eat healthier  Does binge at times with snacks  Did discuss an importance of diabetic diet as well as a heart healthy diet low in sodium, saturated fats and low cholesterol  She stated she is good at reading food labels but her problem is that she does go on food binges which she plans on no longer doing  Denies SOB, chest pain, edema  She stated that she is going to get an MRI of lumber spine due to left leg pain at times causing her to have difficulty walking at times  Order was given by PCP which she was seen in the office last week on 3/7/19  Reviewed over up coming appointment with neurologist on 3/14/19  She stated she will be attending and has no issues with transportation  She is tolerating her medications  Denied having issues obtaining medications and is not having financial hardships  Educated on good medication management and importance of having a good regime  She stated that she is good with remembering to take her medications  Has a good morning routine set  Stated she manages her own medications  Currently has no questions or concerns  Provided her with care management contact information if need be  Will f/u

## 2019-03-14 ENCOUNTER — OFFICE VISIT (OUTPATIENT)
Dept: PHYSICAL THERAPY | Facility: CLINIC | Age: 74
End: 2019-03-14
Payer: COMMERCIAL

## 2019-03-14 DIAGNOSIS — I70.0 AORTOILIAC STENOSIS, LEFT (HCC): ICD-10-CM

## 2019-03-14 PROCEDURE — 97112 NEUROMUSCULAR REEDUCATION: CPT

## 2019-03-14 NOTE — PROGRESS NOTES
Daily Note     Today's date: 3/14/2019  Patient name: Rogers Multani  : [id number]  MRN: [id number]  Referring provider: Leslie Lawson DO  Dx:   Encounter Diagnosis     ICD-10-CM    1  Aortoiliac stenosis, left (HCC) I70 0                   Subjective: Upon presentation patient reports mild pain in lateral L thigh  Objective: See treatment diary below    Precautions: HTN, hx stroke, DM, HLD     Daily Treatment Diary      Manual  2/19 2/26 3/1  3/6    3/14           Lateral shift correct 5' np np  np    EN 3'                                                                                                                 Exercise Diary  2/19 2/26 3/1  3/6  3/11  3/14           Bike   8' 10'  10'  held             DKTC with PB   5"x20 10"x15  d/c  10"x10 5 rounds  10"x10 2x           SKTC   5"x20 10"x10  d/c  10"x10  10"x10           Seated PB rollouts   5"x 20 20x  d/c  10"x10  10"x10           Seated lumbar flexion   5" x20 5"x20  d/c 10"x10  10"x10           Child's pose stretch   30'"x3 30"x3  d/c  20"x4             PPT   5" x10 5"x20  d/c               TA contraction   5" x20 np  5"x20  held  5"x20           DLS ball press   4"J77 5"x20  5"x20  held  5"x20           STS     2x10  2x10  held             Piriformis stretch     30"x3  d/c               Figure 4 stretch     30"x3  30"x3  held              prone lying with pillows under abdomen        3'  held [facility]            prone lying        3'  held  DC            FAISAL        2'  held  DC            PPT        x10  held  DC            bridges        x20  held  pain            DLS row/ext        RTB x20  held held            paloff press        RTBx20  held             Lateral walking    RTB 4 laps held         standing three way        nv  held               Assessment: Patient continues to demonstrate positive response to flexion based TE  Requires VC to maintain core contraction t/o TE  Patient educated on the importance of repeated extensions   Patient offers no complaints post session  Plan: Cont per POC, progress as able

## 2019-03-18 ENCOUNTER — OFFICE VISIT (OUTPATIENT)
Dept: PHYSICAL THERAPY | Facility: CLINIC | Age: 74
End: 2019-03-18
Payer: COMMERCIAL

## 2019-03-18 DIAGNOSIS — M48.061 SPINAL STENOSIS OF LUMBAR REGION WITHOUT NEUROGENIC CLAUDICATION: Primary | ICD-10-CM

## 2019-03-18 DIAGNOSIS — M51.26 LUMBAR DISC HERNIATION: ICD-10-CM

## 2019-03-18 DIAGNOSIS — I70.0 AORTOILIAC STENOSIS, LEFT (HCC): ICD-10-CM

## 2019-03-18 DIAGNOSIS — M79.605 PAIN OF LEFT LOWER EXTREMITY: ICD-10-CM

## 2019-03-18 PROCEDURE — 97110 THERAPEUTIC EXERCISES: CPT

## 2019-03-18 PROCEDURE — 97112 NEUROMUSCULAR REEDUCATION: CPT

## 2019-03-18 NOTE — PROGRESS NOTES
Daily Note     Today's date: 3/18/2019  Patient name: Porsche Bronson  : [id number]  MRN: [id number]  Referring provider: Kamaljit Mittal DO  Dx:   Encounter Diagnosis     ICD-10-CM    1  Spinal stenosis of lumbar region without neurogenic claudication M48 061    2  Aortoiliac stenosis, left (HCC) I70 0    3  Pain of left lower extremity M79 605    4  Lumbar disc herniation M51 26                   Subjective: Pt reports L thigh pain continues to occur when seated for prolonged period of time  Pt states flexion exercises help with pain    No L thigh pain reported upon arrival       Objective: See treatment diary below  Precautions: HTN, hx stroke, DM, HLD     Daily Treatment Diary      Manual  2/19 2/26 3/1  3/6    3/14  3/18         Lateral shift correct 5' np np  np    EN 3'                                                                                                                 Exercise Diary  2/19 2/26 3/1  3/6  3/11  3/14  3/18         Bike   8' 10'  10'  held             DKTC with PB   5"x20 10"x15  d/c  10"x10 5 rounds  10"x10 2x  10"x10         SKTC   5"x20 10"x10  d/c  10"x10  10"x10  10"x10         Seated PB rollouts   5"x 20 20x  d/c  10"x10  10"x10  5"x20         Seated lumbar flexion   5" x20 5"x20  d/c 10"x10  10"x10  5"x20 (x2)         Child's pose stretch   30'"x3 30"x3  d/c  20"x4             PPT   5" x10 5"x20  d/c               TA contraction   5" x20 np  5"x20  held  5"x20  5"x20         DLS ball press   4"Q23 5"x20  5"x20  held  5"x20  5"x20 stand         STS     2x10  2x10  held             Piriformis stretch     30"x3  d/c      30"x4         Figure 4 stretch     30"x3  30"x3  held              prone lying with pillows under abdomen        3'  held [facility]            prone lying        3'  held [facility]            FAISAL        2'  held [facility]            PPT        x10  held  DC            bridges        x20  held  pain            DLS row/ext        RTB x20  held held            paloff press        RTBx20  held             Lateral walking    RTB 4 laps held         standing three way        nv  held   abd inc pain in spine         L side glides       x10          Assessment: Pt experienced L medial thigh/groin "burning" sensation while lying supine which was relieved with seated flexion  Pt reported increased LBP after performing standing hip abduction  Patient instructed not to perform lateral trunk lean with with standing hip ABD, which did not help to resolve problem  L side glides performed followed by seated flexion which abolished pain  Will monitor response to treatment nv  Plan: Continue per plan of care

## 2019-03-20 ENCOUNTER — TELEPHONE (OUTPATIENT)
Dept: NEUROLOGY | Facility: CLINIC | Age: 74
End: 2019-03-20

## 2019-03-20 ENCOUNTER — OFFICE VISIT (OUTPATIENT)
Dept: NEUROLOGY | Facility: CLINIC | Age: 74
End: 2019-03-20
Payer: COMMERCIAL

## 2019-03-20 VITALS
WEIGHT: 147 LBS | SYSTOLIC BLOOD PRESSURE: 130 MMHG | HEART RATE: 78 BPM | HEIGHT: 58 IN | DIASTOLIC BLOOD PRESSURE: 84 MMHG | BODY MASS INDEX: 30.86 KG/M2

## 2019-03-20 DIAGNOSIS — M70.62 TROCHANTERIC BURSITIS OF LEFT HIP: ICD-10-CM

## 2019-03-20 DIAGNOSIS — M47.816 SPONDYLOSIS OF LUMBAR REGION WITHOUT MYELOPATHY OR RADICULOPATHY: Primary | ICD-10-CM

## 2019-03-20 DIAGNOSIS — G62.9 POLYNEUROPATHY: ICD-10-CM

## 2019-03-20 PROCEDURE — 99214 OFFICE O/P EST MOD 30 MIN: CPT | Performed by: PSYCHIATRY & NEUROLOGY

## 2019-03-20 RX ORDER — TRAMADOL HYDROCHLORIDE 50 MG/1
50 TABLET ORAL EVERY 8 HOURS PRN
Qty: 60 TABLET | Refills: 1 | Status: SHIPPED | OUTPATIENT
Start: 2019-03-20 | End: 2020-02-21 | Stop reason: ALTCHOICE

## 2019-03-20 NOTE — PROGRESS NOTES
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, and has been going for physical therapy on a regular basis with significant improvement however she continues to have pain along the lateral aspect of the left thigh especially when she sits for any prolonged period of time  Patient has been using tramadol on a p r n  Basis and has noted  improvement with her low back pain  She was also prescribed gabapentin which she has not used and denies any bladder bowel symptoms  X-ray of the left hip showed degenerative changes but no evidence of any fracture  ROS:   Review of Systems   Constitutional: Negative  Negative for appetite change and fever  HENT: Positive for trouble swallowing  Negative for hearing loss, tinnitus and voice change  Eyes: Negative  Negative for photophobia and pain  Respiratory: Positive for shortness of breath  Cardiovascular: Negative  Negative for chest pain and palpitations  Gastrointestinal: Negative  Negative for nausea and vomiting  Endocrine: Negative  Negative for cold intolerance and heat intolerance  Genitourinary: Negative  Negative for dysuria, frequency and urgency  Musculoskeletal: Positive for back pain and gait problem  Negative for myalgias and neck pain  Left leg pain   Skin: Negative  Negative for rash  Neurological: Positive for weakness  Negative for dizziness, tremors, seizures, syncope, facial asymmetry, speech difficulty, light-headedness, numbness and headaches          Vertigo when laying flat   Hematological: Negative  Does not bruise/bleed easily  Psychiatric/Behavioral: Positive for sleep disturbance  Negative for confusion and hallucinations  Vitals:   Vitals:    03/20/19 1109   BP: 130/84   BP Location: Left arm   Patient Position: Sitting   Cuff Size: Adult   Pulse: 78   Weight: 66 7 kg (147 lb)   Height: 4' 10 25" (1 48 m)   ,Body mass index is 30 46 kg/m²  MEDS:      Current Outpatient Medications:     amLODIPine (NORVASC) 5 mg tablet, Take 5 mg by mouth 2 (two) times a day, Disp: , Rfl:     chlorthalidone 25 mg tablet, Take 1 tablet (25 mg total) by mouth daily, Disp: 90 tablet, Rfl: 2    Cholecalciferol (VITAMIN D3) 2000 units capsule, Take 1 capsule by mouth every other day  , Disp: , Rfl:     clopidogrel (PLAVIX) 75 mg tablet, Take 1 tablet by mouth daily, Disp: , Rfl:     ferrous sulfate 325 (65 Fe) mg tablet, Take 325 mg by mouth daily, Disp: , Rfl:     folic acid (FOLVITE) 119 MCG tablet, Take 800 mcg by mouth daily , Disp: , Rfl:     losartan (COZAAR) 100 MG tablet, Take 100 mg by mouth every morning , Disp: , Rfl:     losartan (COZAAR) 50 mg tablet, Take 1 tablet by mouth daily  , Disp: , Rfl:     Omega-3 Fatty Acids (SM FISH OIL) 1200 MG CAPS, Take by mouth 2 (two) times a day, Disp: , Rfl:     potassium chloride (MICRO-K) 10 MEQ CR capsule, Take 1 capsule (10 mEq total) by mouth 2 (two) times a day, Disp: 90 capsule, Rfl: 2    traMADol (ULTRAM) 50 mg tablet, Take 50 mg by mouth every 6 (six) hours as needed for moderate pain, Disp: , Rfl:   :    Physical Exam:  General appearance: alert, appears stated age and cooperative  Head: Normocephalic, without obvious abnormality, atraumatic    On neurological examination patient has evidence of sensory loss affecting both feet, and legs with no evidence of any focal motor weakness, in the upper lower extremities, deep tendon reflexes are 1+ bilaterally, no evidence of any dysmetria and her gait is normal based    There is no evidence of any new cranial nerve deficit  Patient has evidence of tenderness along the greater trochanter of the left hip which is reproducible and there is no significant lumbosacral tenderness at this time  Lab Results: I have personally reviewed pertinent reports  Imaging Studies: I have personally reviewed pertinent reports  Assessment:  1  Lumbar spinal stenosis  2  Polyneuropathy  3  Left hip strain most likely secondary to bursitis  Plan:  Patient is to see vascular surgery in the near future, is also scheduled for an MRI of the lumbar spine would recommend that we do an MRI of the left hip instead since she has not had any real in spite of physical therapy, pain management was recommended but the patient does not wish to consider the same  She will continue tramadol on a p r n  Basis, continue with rehab and return back to see me in 3 months     3/20/2019,11:28 AM    Dictation voice to text software has been used in the creation of this document  Please consider this in light of any contextual or grammatical errors

## 2019-03-20 NOTE — TELEPHONE ENCOUNTER
Patient stated to MRI to cancel her MRI of the Lumbar Spine that you recommended the hip instead  Do you recommend doing both MRIs or just the Hip MRI?

## 2019-03-21 ENCOUNTER — APPOINTMENT (OUTPATIENT)
Dept: PHYSICAL THERAPY | Facility: CLINIC | Age: 74
End: 2019-03-21
Payer: COMMERCIAL

## 2019-03-21 LAB
ALBUMIN SERPL-MCNC: 4.5 G/DL (ref 3.5–4.8)
BUN SERPL-MCNC: 32 MG/DL (ref 8–27)
BUN/CREAT SERPL: 21 (ref 12–28)
CALCIUM SERPL-MCNC: 10.4 MG/DL (ref 8.7–10.3)
CHLORIDE SERPL-SCNC: 100 MMOL/L (ref 96–106)
CO2 SERPL-SCNC: 25 MMOL/L (ref 20–29)
CREAT SERPL-MCNC: 1.54 MG/DL (ref 0.57–1)
GLUCOSE SERPL-MCNC: 97 MG/DL (ref 65–99)
PHOSPHATE SERPL-MCNC: 3.4 MG/DL (ref 2.5–4.5)
POTASSIUM SERPL-SCNC: 4.4 MMOL/L (ref 3.5–5.2)
SL AMB EGFR AFRICAN AMERICAN: 39 ML/MIN/1.73
SL AMB EGFR NON AFRICAN AMERICAN: 34 ML/MIN/1.73
SODIUM SERPL-SCNC: 142 MMOL/L (ref 134–144)

## 2019-03-21 NOTE — TELEPHONE ENCOUNTER
Call patient to inform her to get the MRI of the hip done first and that I will send the order over to central scheduling so they can schedule the test  No answer  Left voicemail

## 2019-03-25 ENCOUNTER — OFFICE VISIT (OUTPATIENT)
Dept: PHYSICAL THERAPY | Facility: CLINIC | Age: 74
End: 2019-03-25
Payer: COMMERCIAL

## 2019-03-25 DIAGNOSIS — M48.061 SPINAL STENOSIS OF LUMBAR REGION WITHOUT NEUROGENIC CLAUDICATION: ICD-10-CM

## 2019-03-25 DIAGNOSIS — M51.26 LUMBAR DISC HERNIATION: ICD-10-CM

## 2019-03-25 DIAGNOSIS — M79.605 PAIN OF LEFT LOWER EXTREMITY: ICD-10-CM

## 2019-03-25 DIAGNOSIS — I70.0 AORTOILIAC STENOSIS, LEFT (HCC): Primary | ICD-10-CM

## 2019-03-25 PROCEDURE — 97110 THERAPEUTIC EXERCISES: CPT | Performed by: PHYSICAL THERAPIST

## 2019-03-25 PROCEDURE — 97140 MANUAL THERAPY 1/> REGIONS: CPT | Performed by: PHYSICAL THERAPIST

## 2019-03-25 NOTE — PROGRESS NOTES
Daily Note     Today's date: 3/25/2019  Patient name: Randall Gibson  :   MRN: [id number]  Referring provider: Annie Sevilla DO  Dx:   Encounter Diagnosis     ICD-10-CM    1  Aortoiliac stenosis, left (HCC) I70 0    2  Spinal stenosis of lumbar region without neurogenic claudication M48 061    3  Lumbar disc herniation M51 26    4  Pain of left lower extremity M79 605        Start Time: 1015  Stop Time: 1117  Total time in clinic (min): 62 minutes    Subjective: Pt reports she went to the doctor and he ordered an MRI for her hip        Objective: See treatment diary below  Precautions: HTN, hx stroke, DM, HLD     Daily Treatment Diary      Manual  2/19 2/26 3/1  3/6    3/14  3/18 3/25       Lateral shift correct 5' np np  np    EN 3'           Lumbar PA mobs               5'       L hip PA mobs               5'                                                             Exercise Diary  2/19 2/26 3/1  3/6  3/11  3/14  3/18 3/25       Bike   8' 10'  10'  held     10'       DKTC with PB   5"x20 10"x15  d/c  10"x10 5 rounds  10"x10 2x  10"x10 10"x10       SKTC   5"x20 10"x10  d/c 10"x10 10"x10 10"x10 10"x10       Seated PB rollouts   5"x 20 20x  d/c  10"x10  10"x10  5"x20 5"x20       Seated lumbar flexion   5" x20 5"x20  d/c 10"x10  10"x10  5"x20 (x2) 20x       Child's pose stretch   30'"x3 30"x3  d/c  20"x4             PPT   5" x10 5"x20  d/c               TA contraction   5" x20 np  5"x20  held  5"x20  5"x20         DLS ball press   9"H86 5"x20  5"x20  held  5"x20  5"x20 stand 5"x20 stand       STS     2x10  2x10  held             Piriformis stretch     30"x3  d/c      30"x4 30"x4       Figure 4 stretch     30"x3  30"x3  held              prone lying with pillows under abdomen        3'  held [facility]            prone lying        3'  held [facility]            FAISAL        2'  held [facility]            PPT        x10  held  DC            bridges        x20  held  pain            DLS row/ext        RTB x20  held held            paloff press        RTBx20  held             Lateral walking       RTB 4 laps held              standing three way        nv  held    abd inc pain in spine         L side glides             x10             Assessment: Pain with hip mobs initially, however no pain with hip mobs following lumbar mobs  No increased in pain following lumbar flexion program  Improved L hip pain following session  Plan: Progress as tolerated

## 2019-03-27 ENCOUNTER — TELEPHONE (OUTPATIENT)
Dept: NEUROLOGY | Facility: CLINIC | Age: 74
End: 2019-03-27

## 2019-03-27 ENCOUNTER — HOSPITAL ENCOUNTER (OUTPATIENT)
Dept: MRI IMAGING | Facility: CLINIC | Age: 74
Discharge: HOME | End: 2019-03-27
Payer: COMMERCIAL

## 2019-03-27 DIAGNOSIS — M70.62 TROCHANTERIC BURSITIS OF LEFT HIP: Primary | ICD-10-CM

## 2019-03-27 DIAGNOSIS — M70.62 TROCHANTERIC BURSITIS OF LEFT HIP: ICD-10-CM

## 2019-03-27 PROCEDURE — 73721 MRI JNT OF LWR EXTRE W/O DYE: CPT

## 2019-03-27 NOTE — TELEPHONE ENCOUNTER
Pt called stated she just missed a call from [facility] 75 reports that the person hung up right as she picked up, questioning if this was regarding her MRI today?

## 2019-03-28 ENCOUNTER — APPOINTMENT (OUTPATIENT)
Dept: PHYSICAL THERAPY | Facility: CLINIC | Age: 74
End: 2019-03-28
Payer: COMMERCIAL

## 2019-04-01 ENCOUNTER — PATIENT OUTREACH (OUTPATIENT)
Dept: INTERNAL MEDICINE CLINIC | Facility: CLINIC | Age: 74
End: 2019-04-01

## 2019-04-01 ENCOUNTER — APPOINTMENT (OUTPATIENT)
Dept: PHYSICAL THERAPY | Facility: CLINIC | Age: 74
End: 2019-04-01
Payer: COMMERCIAL

## 2019-04-01 ENCOUNTER — HOSPITAL ENCOUNTER (EMERGENCY)
Facility: HOSPITAL | Age: 74
Discharge: HOME | End: 2019-04-01
Attending: EMERGENCY MEDICINE | Admitting: EMERGENCY MEDICINE
Payer: COMMERCIAL

## 2019-04-01 ENCOUNTER — APPOINTMENT (EMERGENCY)
Dept: RADIOLOGY | Facility: HOSPITAL | Age: 74
End: 2019-04-01
Payer: COMMERCIAL

## 2019-04-01 VITALS
HEIGHT: 58 IN | RESPIRATION RATE: 18 BRPM | BODY MASS INDEX: 32.02 KG/M2 | TEMPERATURE: 98.4 F | DIASTOLIC BLOOD PRESSURE: 67 MMHG | OXYGEN SATURATION: 100 % | WEIGHT: 152.56 LBS | SYSTOLIC BLOOD PRESSURE: 148 MMHG | HEART RATE: 90 BPM

## 2019-04-01 DIAGNOSIS — M25.572 LEFT ANKLE PAIN: Primary | ICD-10-CM

## 2019-04-01 DIAGNOSIS — S93.402A LEFT ANKLE SPRAIN: ICD-10-CM

## 2019-04-01 DIAGNOSIS — M25.472 LEFT ANKLE SWELLING: ICD-10-CM

## 2019-04-01 PROCEDURE — 99283 EMERGENCY DEPT VISIT LOW MDM: CPT

## 2019-04-01 PROCEDURE — 73610 X-RAY EXAM OF ANKLE: CPT

## 2019-04-01 PROCEDURE — 99284 EMERGENCY DEPT VISIT MOD MDM: CPT | Performed by: EMERGENCY MEDICINE

## 2019-04-01 RX ORDER — LIDOCAINE 50 MG/G
OINTMENT TOPICAL AS NEEDED
Qty: 35.44 G | Refills: 0 | Status: SHIPPED | OUTPATIENT
Start: 2019-04-01 | End: 2019-07-09 | Stop reason: ALTCHOICE

## 2019-04-01 RX ORDER — LIDOCAINE 50 MG/G
1 PATCH TOPICAL ONCE
Status: DISCONTINUED | OUTPATIENT
Start: 2019-04-01 | End: 2019-04-01 | Stop reason: HOSPADM

## 2019-04-01 RX ORDER — ACETAMINOPHEN 325 MG/1
650 TABLET ORAL ONCE
Status: DISCONTINUED | OUTPATIENT
Start: 2019-04-01 | End: 2019-04-01 | Stop reason: HOSPADM

## 2019-04-01 RX ORDER — LIDOCAINE 50 MG/G
OINTMENT TOPICAL AS NEEDED
Qty: 35.44 G | Refills: 0 | Status: SHIPPED | OUTPATIENT
Start: 2019-04-01 | End: 2019-04-01 | Stop reason: SDUPTHER

## 2019-04-01 RX ADMIN — LIDOCAINE 1 PATCH: 50 PATCH TOPICAL at 14:32

## 2019-04-02 ENCOUNTER — OFFICE VISIT (OUTPATIENT)
Dept: INTERNAL MEDICINE CLINIC | Facility: CLINIC | Age: 74
End: 2019-04-02
Payer: COMMERCIAL

## 2019-04-02 VITALS
WEIGHT: 153.2 LBS | OXYGEN SATURATION: 98 % | HEIGHT: 58 IN | HEART RATE: 90 BPM | RESPIRATION RATE: 18 BRPM | TEMPERATURE: 98.1 F | BODY MASS INDEX: 32.16 KG/M2 | SYSTOLIC BLOOD PRESSURE: 130 MMHG | DIASTOLIC BLOOD PRESSURE: 70 MMHG

## 2019-04-02 DIAGNOSIS — I10 HYPERTENSION, UNSPECIFIED TYPE: ICD-10-CM

## 2019-04-02 DIAGNOSIS — M10.272 ACUTE DRUG-INDUCED GOUT OF LEFT FOOT: Primary | ICD-10-CM

## 2019-04-02 PROCEDURE — 3075F SYST BP GE 130 - 139MM HG: CPT | Performed by: INTERNAL MEDICINE

## 2019-04-02 PROCEDURE — 3078F DIAST BP <80 MM HG: CPT | Performed by: INTERNAL MEDICINE

## 2019-04-02 PROCEDURE — 99214 OFFICE O/P EST MOD 30 MIN: CPT | Performed by: INTERNAL MEDICINE

## 2019-04-02 RX ORDER — PREDNISONE 10 MG/1
TABLET ORAL
Qty: 27 TABLET | Refills: 0 | Status: SHIPPED | OUTPATIENT
Start: 2019-04-02 | End: 2019-04-12 | Stop reason: ALTCHOICE

## 2019-04-02 RX ORDER — LABETALOL 100 MG/1
100 TABLET, FILM COATED ORAL 2 TIMES DAILY
Qty: 60 TABLET | Refills: 2 | Status: SHIPPED | OUTPATIENT
Start: 2019-04-02 | End: 2019-07-02 | Stop reason: SDUPTHER

## 2019-04-03 ENCOUNTER — OFFICE VISIT (OUTPATIENT)
Dept: PHYSICAL THERAPY | Facility: CLINIC | Age: 74
End: 2019-04-03
Payer: COMMERCIAL

## 2019-04-03 DIAGNOSIS — M51.26 LUMBAR DISC HERNIATION: ICD-10-CM

## 2019-04-03 DIAGNOSIS — M48.061 SPINAL STENOSIS OF LUMBAR REGION WITHOUT NEUROGENIC CLAUDICATION: Primary | ICD-10-CM

## 2019-04-03 DIAGNOSIS — M79.605 PAIN OF LEFT LOWER EXTREMITY: ICD-10-CM

## 2019-04-03 PROCEDURE — 97112 NEUROMUSCULAR REEDUCATION: CPT | Performed by: PHYSICAL THERAPIST

## 2019-04-03 PROCEDURE — 97110 THERAPEUTIC EXERCISES: CPT | Performed by: PHYSICAL THERAPIST

## 2019-04-08 ENCOUNTER — OFFICE VISIT (OUTPATIENT)
Dept: PHYSICAL THERAPY | Facility: CLINIC | Age: 74
End: 2019-04-08
Payer: COMMERCIAL

## 2019-04-08 ENCOUNTER — PATIENT OUTREACH (OUTPATIENT)
Dept: INTERNAL MEDICINE CLINIC | Facility: CLINIC | Age: 74
End: 2019-04-08

## 2019-04-08 DIAGNOSIS — M48.061 SPINAL STENOSIS OF LUMBAR REGION WITHOUT NEUROGENIC CLAUDICATION: Primary | ICD-10-CM

## 2019-04-08 DIAGNOSIS — M51.26 LUMBAR DISC HERNIATION: ICD-10-CM

## 2019-04-08 DIAGNOSIS — I70.0 AORTOILIAC STENOSIS, LEFT (HCC): ICD-10-CM

## 2019-04-08 DIAGNOSIS — M79.605 PAIN OF LEFT LOWER EXTREMITY: ICD-10-CM

## 2019-04-08 PROCEDURE — 97110 THERAPEUTIC EXERCISES: CPT

## 2019-04-08 PROCEDURE — 97112 NEUROMUSCULAR REEDUCATION: CPT

## 2019-04-09 ENCOUNTER — HOSPITAL ENCOUNTER (OUTPATIENT)
Dept: MRI IMAGING | Facility: CLINIC | Age: 74
Discharge: HOME | End: 2019-04-09
Payer: COMMERCIAL

## 2019-04-09 DIAGNOSIS — M51.26 LUMBAR DISC HERNIATION: ICD-10-CM

## 2019-04-09 DIAGNOSIS — M47.816 SPONDYLOSIS OF LUMBAR REGION WITHOUT MYELOPATHY OR RADICULOPATHY: ICD-10-CM

## 2019-04-09 PROCEDURE — 72148 MRI LUMBAR SPINE W/O DYE: CPT

## 2019-04-11 ENCOUNTER — OFFICE VISIT (OUTPATIENT)
Dept: PHYSICAL THERAPY | Facility: CLINIC | Age: 74
End: 2019-04-11
Payer: COMMERCIAL

## 2019-04-11 DIAGNOSIS — M51.26 LUMBAR DISC HERNIATION: ICD-10-CM

## 2019-04-11 DIAGNOSIS — M48.061 SPINAL STENOSIS OF LUMBAR REGION WITHOUT NEUROGENIC CLAUDICATION: Primary | ICD-10-CM

## 2019-04-11 DIAGNOSIS — I70.0 AORTOILIAC STENOSIS, LEFT (HCC): ICD-10-CM

## 2019-04-11 DIAGNOSIS — M79.605 PAIN OF LEFT LOWER EXTREMITY: ICD-10-CM

## 2019-04-11 PROCEDURE — 97112 NEUROMUSCULAR REEDUCATION: CPT | Performed by: PHYSICAL THERAPIST

## 2019-04-11 PROCEDURE — 97110 THERAPEUTIC EXERCISES: CPT | Performed by: PHYSICAL THERAPIST

## 2019-04-12 ENCOUNTER — OFFICE VISIT (OUTPATIENT)
Dept: INTERNAL MEDICINE CLINIC | Facility: CLINIC | Age: 74
End: 2019-04-12
Payer: COMMERCIAL

## 2019-04-12 VITALS
RESPIRATION RATE: 18 BRPM | HEART RATE: 68 BPM | OXYGEN SATURATION: 98 % | TEMPERATURE: 97.1 F | SYSTOLIC BLOOD PRESSURE: 128 MMHG | WEIGHT: 153.8 LBS | HEIGHT: 58 IN | DIASTOLIC BLOOD PRESSURE: 62 MMHG | BODY MASS INDEX: 32.28 KG/M2

## 2019-04-12 DIAGNOSIS — E11.21 TYPE 2 DIABETES MELLITUS WITH DIABETIC NEPHROPATHY, WITHOUT LONG-TERM CURRENT USE OF INSULIN (HCC): ICD-10-CM

## 2019-04-12 DIAGNOSIS — M10.272 ACUTE DRUG-INDUCED GOUT OF LEFT FOOT: Primary | ICD-10-CM

## 2019-04-12 PROCEDURE — 99214 OFFICE O/P EST MOD 30 MIN: CPT | Performed by: NURSE PRACTITIONER

## 2019-04-15 ENCOUNTER — OFFICE VISIT (OUTPATIENT)
Dept: PHYSICAL THERAPY | Facility: CLINIC | Age: 74
End: 2019-04-15
Payer: COMMERCIAL

## 2019-04-15 ENCOUNTER — PATIENT OUTREACH (OUTPATIENT)
Dept: INTERNAL MEDICINE CLINIC | Facility: CLINIC | Age: 74
End: 2019-04-15

## 2019-04-15 DIAGNOSIS — I70.0 AORTOILIAC STENOSIS, LEFT (HCC): ICD-10-CM

## 2019-04-15 DIAGNOSIS — M79.605 PAIN OF LEFT LOWER EXTREMITY: ICD-10-CM

## 2019-04-15 DIAGNOSIS — M51.26 LUMBAR DISC HERNIATION: ICD-10-CM

## 2019-04-15 DIAGNOSIS — M48.061 SPINAL STENOSIS OF LUMBAR REGION WITHOUT NEUROGENIC CLAUDICATION: Primary | ICD-10-CM

## 2019-04-15 PROCEDURE — 97110 THERAPEUTIC EXERCISES: CPT

## 2019-04-15 PROCEDURE — 97112 NEUROMUSCULAR REEDUCATION: CPT

## 2019-04-18 ENCOUNTER — APPOINTMENT (OUTPATIENT)
Dept: PHYSICAL THERAPY | Facility: CLINIC | Age: 74
End: 2019-04-18
Payer: COMMERCIAL

## 2019-04-22 ENCOUNTER — APPOINTMENT (OUTPATIENT)
Dept: PHYSICAL THERAPY | Facility: CLINIC | Age: 74
End: 2019-04-22
Payer: COMMERCIAL

## 2019-04-25 ENCOUNTER — APPOINTMENT (OUTPATIENT)
Dept: PHYSICAL THERAPY | Facility: CLINIC | Age: 74
End: 2019-04-25
Payer: COMMERCIAL

## 2019-04-26 ENCOUNTER — APPOINTMENT (OUTPATIENT)
Dept: RADIOLOGY | Facility: CLINIC | Age: 74
End: 2019-04-26
Payer: COMMERCIAL

## 2019-04-26 ENCOUNTER — OFFICE VISIT (OUTPATIENT)
Dept: OBGYN CLINIC | Facility: CLINIC | Age: 74
End: 2019-04-26
Payer: COMMERCIAL

## 2019-04-26 VITALS
HEART RATE: 69 BPM | SYSTOLIC BLOOD PRESSURE: 176 MMHG | HEIGHT: 58 IN | WEIGHT: 159 LBS | DIASTOLIC BLOOD PRESSURE: 83 MMHG | BODY MASS INDEX: 33.37 KG/M2

## 2019-04-26 DIAGNOSIS — M25.552 LEFT HIP PAIN: Primary | ICD-10-CM

## 2019-04-26 DIAGNOSIS — M70.62 TROCHANTERIC BURSITIS OF LEFT HIP: ICD-10-CM

## 2019-04-26 DIAGNOSIS — M25.552 LEFT HIP PAIN: ICD-10-CM

## 2019-04-26 PROCEDURE — 73502 X-RAY EXAM HIP UNI 2-3 VIEWS: CPT

## 2019-04-26 PROCEDURE — 99203 OFFICE O/P NEW LOW 30 MIN: CPT | Performed by: ORTHOPAEDIC SURGERY

## 2019-04-29 ENCOUNTER — APPOINTMENT (OUTPATIENT)
Dept: PHYSICAL THERAPY | Facility: CLINIC | Age: 74
End: 2019-04-29
Payer: COMMERCIAL

## 2019-05-06 ENCOUNTER — PATIENT OUTREACH (OUTPATIENT)
Dept: INTERNAL MEDICINE CLINIC | Facility: CLINIC | Age: 74
End: 2019-05-06

## 2019-05-13 ENCOUNTER — OFFICE VISIT (OUTPATIENT)
Dept: INTERNAL MEDICINE CLINIC | Facility: CLINIC | Age: 74
End: 2019-05-13
Payer: COMMERCIAL

## 2019-05-13 VITALS
RESPIRATION RATE: 18 BRPM | OXYGEN SATURATION: 98 % | TEMPERATURE: 97.9 F | WEIGHT: 158.8 LBS | DIASTOLIC BLOOD PRESSURE: 70 MMHG | HEART RATE: 96 BPM | SYSTOLIC BLOOD PRESSURE: 130 MMHG | HEIGHT: 58 IN | BODY MASS INDEX: 33.33 KG/M2

## 2019-05-13 DIAGNOSIS — R05.9 COUGH: Primary | ICD-10-CM

## 2019-05-13 PROCEDURE — 99213 OFFICE O/P EST LOW 20 MIN: CPT | Performed by: NURSE PRACTITIONER

## 2019-05-13 PROCEDURE — 3725F SCREEN DEPRESSION PERFORMED: CPT | Performed by: NURSE PRACTITIONER

## 2019-05-13 PROCEDURE — 3008F BODY MASS INDEX DOCD: CPT | Performed by: NURSE PRACTITIONER

## 2019-05-13 PROCEDURE — 1160F RVW MEDS BY RX/DR IN RCRD: CPT | Performed by: NURSE PRACTITIONER

## 2019-05-13 RX ORDER — PREDNISONE 10 MG/1
10 TABLET ORAL DAILY
Qty: 5 TABLET | Refills: 0 | Status: SHIPPED | OUTPATIENT
Start: 2019-05-13 | End: 2019-07-09 | Stop reason: ALTCHOICE

## 2019-05-13 RX ORDER — GUAIFENESIN 600 MG
600 TABLET, EXTENDED RELEASE 12 HR ORAL EVERY 12 HOURS SCHEDULED
Qty: 10 TABLET | Refills: 0
Start: 2019-05-13 | End: 2019-07-09 | Stop reason: ALTCHOICE

## 2019-05-13 RX ORDER — BENZONATATE 100 MG/1
100 CAPSULE ORAL 3 TIMES DAILY PRN
Qty: 20 CAPSULE | Refills: 0 | Status: SHIPPED | OUTPATIENT
Start: 2019-05-13 | End: 2019-07-09 | Stop reason: ALTCHOICE

## 2019-05-15 ENCOUNTER — TELEPHONE (OUTPATIENT)
Dept: INTERNAL MEDICINE CLINIC | Facility: CLINIC | Age: 74
End: 2019-05-15

## 2019-05-15 DIAGNOSIS — J06.9 URI, ACUTE: Primary | ICD-10-CM

## 2019-05-15 LAB — HBA1C MFR BLD HPLC: 6 %

## 2019-05-15 RX ORDER — CEFUROXIME AXETIL 500 MG/1
500 TABLET ORAL EVERY 12 HOURS SCHEDULED
Qty: 20 TABLET | Refills: 0 | Status: SHIPPED | OUTPATIENT
Start: 2019-05-15 | End: 2019-05-25

## 2019-05-16 ENCOUNTER — PATIENT OUTREACH (OUTPATIENT)
Dept: INTERNAL MEDICINE CLINIC | Facility: CLINIC | Age: 74
End: 2019-05-16

## 2019-05-16 LAB
ALBUMIN SERPL-MCNC: 4.3 G/DL (ref 3.5–4.8)
ALBUMIN/GLOB SERPL: 1.7 {RATIO} (ref 1.2–2.2)
ALP SERPL-CCNC: 96 IU/L (ref 39–117)
ALT SERPL-CCNC: 13 IU/L (ref 0–32)
AST SERPL-CCNC: 14 IU/L (ref 0–40)
BASOPHILS # BLD AUTO: 0 X10E3/UL (ref 0–0.2)
BASOPHILS NFR BLD AUTO: 0 %
BILIRUB SERPL-MCNC: 0.3 MG/DL (ref 0–1.2)
BUN SERPL-MCNC: 24 MG/DL (ref 8–27)
BUN/CREAT SERPL: 17 (ref 12–28)
CALCIUM SERPL-MCNC: 9.4 MG/DL (ref 8.7–10.3)
CHLORIDE SERPL-SCNC: 103 MMOL/L (ref 96–106)
CHOLEST SERPL-MCNC: 316 MG/DL (ref 100–199)
CO2 SERPL-SCNC: 22 MMOL/L (ref 20–29)
CREAT SERPL-MCNC: 1.42 MG/DL (ref 0.57–1)
CYTOLOGY CMNT CVX/VAG CYTO-IMP: ABNORMAL
EOSINOPHIL # BLD AUTO: 0.2 X10E3/UL (ref 0–0.4)
EOSINOPHIL NFR BLD AUTO: 2 %
ERYTHROCYTE [DISTWIDTH] IN BLOOD BY AUTOMATED COUNT: 14.5 % (ref 12.3–15.4)
EST. AVERAGE GLUCOSE BLD GHB EST-MCNC: 126 MG/DL
GLOBULIN SER-MCNC: 2.5 G/DL (ref 1.5–4.5)
GLUCOSE SERPL-MCNC: 99 MG/DL (ref 65–99)
HBA1C MFR BLD: 6 % (ref 4.8–5.6)
HCT VFR BLD AUTO: 40.9 % (ref 34–46.6)
HDLC SERPL-MCNC: 52 MG/DL
HGB BLD-MCNC: 13.6 G/DL (ref 11.1–15.9)
IMM GRANULOCYTES # BLD: 0.1 X10E3/UL (ref 0–0.1)
IMM GRANULOCYTES NFR BLD: 1 %
LDLC SERPL CALC-MCNC: 231 MG/DL (ref 0–99)
LDLC SERPL DIRECT ASSAY-MCNC: 250 MG/DL (ref 0–99)
LYMPHOCYTES # BLD AUTO: 1.8 X10E3/UL (ref 0.7–3.1)
LYMPHOCYTES NFR BLD AUTO: 17 %
MCH RBC QN AUTO: 30 PG (ref 26.6–33)
MCHC RBC AUTO-ENTMCNC: 33.3 G/DL (ref 31.5–35.7)
MCV RBC AUTO: 90 FL (ref 79–97)
MONOCYTES # BLD AUTO: 1.1 X10E3/UL (ref 0.1–0.9)
MONOCYTES NFR BLD AUTO: 11 %
NEUTROPHILS # BLD AUTO: 7.1 X10E3/UL (ref 1.4–7)
NEUTROPHILS NFR BLD AUTO: 69 %
PLATELET # BLD AUTO: 225 X10E3/UL (ref 150–379)
POTASSIUM SERPL-SCNC: 4.1 MMOL/L (ref 3.5–5.2)
PROT SERPL-MCNC: 6.8 G/DL (ref 6–8.5)
RBC # BLD AUTO: 4.53 X10E6/UL (ref 3.77–5.28)
SL AMB EGFR AFRICAN AMERICAN: 43 ML/MIN/1.73
SL AMB EGFR NON AFRICAN AMERICAN: 37 ML/MIN/1.73
SL AMB VLDL CHOLESTEROL CALC: 33 MG/DL (ref 5–40)
SODIUM SERPL-SCNC: 142 MMOL/L (ref 134–144)
TRIGL SERPL-MCNC: 164 MG/DL (ref 0–149)
TSH SERPL DL<=0.005 MIU/L-ACNC: 3.98 UIU/ML (ref 0.45–4.5)
WBC # BLD AUTO: 10.2 X10E3/UL (ref 3.4–10.8)

## 2019-05-17 ENCOUNTER — TELEPHONE (OUTPATIENT)
Dept: INTERNAL MEDICINE CLINIC | Facility: CLINIC | Age: 74
End: 2019-05-17

## 2019-05-17 DIAGNOSIS — E11.69 HYPERLIPIDEMIA ASSOCIATED WITH TYPE 2 DIABETES MELLITUS (HCC): Primary | ICD-10-CM

## 2019-05-17 DIAGNOSIS — E78.5 HYPERLIPIDEMIA ASSOCIATED WITH TYPE 2 DIABETES MELLITUS (HCC): Primary | ICD-10-CM

## 2019-05-28 ENCOUNTER — PATIENT OUTREACH (OUTPATIENT)
Dept: INTERNAL MEDICINE CLINIC | Facility: CLINIC | Age: 74
End: 2019-05-28

## 2019-05-29 DIAGNOSIS — M47.816 SPONDYLOSIS OF LUMBAR REGION WITHOUT MYELOPATHY OR RADICULOPATHY: ICD-10-CM

## 2019-05-29 DIAGNOSIS — M51.26 LUMBAR DISC HERNIATION: ICD-10-CM

## 2019-05-29 RX ORDER — GABAPENTIN 300 MG/1
300 CAPSULE ORAL
Qty: 30 CAPSULE | Refills: 2 | Status: SHIPPED | OUTPATIENT
Start: 2019-05-29 | End: 2019-07-09 | Stop reason: ALTCHOICE

## 2019-06-10 DIAGNOSIS — I73.9 CLAUDICATION IN PERIPHERAL VASCULAR DISEASE (HCC): ICD-10-CM

## 2019-06-10 DIAGNOSIS — I65.1 BASILAR ARTERY STENOSIS: ICD-10-CM

## 2019-06-10 DIAGNOSIS — Z86.73 HISTORY OF CVA (CEREBROVASCULAR ACCIDENT): Primary | ICD-10-CM

## 2019-06-10 RX ORDER — CLOPIDOGREL BISULFATE 75 MG/1
75 TABLET ORAL DAILY
Qty: 90 TABLET | Refills: 1 | Status: SHIPPED | OUTPATIENT
Start: 2019-06-10 | End: 2019-10-10 | Stop reason: SDUPTHER

## 2019-06-29 DIAGNOSIS — M10.272 ACUTE DRUG-INDUCED GOUT OF LEFT FOOT: ICD-10-CM

## 2019-07-01 ENCOUNTER — HOSPITAL ENCOUNTER (OUTPATIENT)
Dept: NON INVASIVE DIAGNOSTICS | Facility: CLINIC | Age: 74
Discharge: HOME | End: 2019-07-01
Payer: COMMERCIAL

## 2019-07-01 DIAGNOSIS — I63.9 CEREBROVASCULAR ACCIDENT (CVA), UNSPECIFIED MECHANISM (HCC): ICD-10-CM

## 2019-07-01 PROCEDURE — 93880 EXTRACRANIAL BILAT STUDY: CPT

## 2019-07-02 DIAGNOSIS — M10.272 ACUTE DRUG-INDUCED GOUT OF LEFT FOOT: ICD-10-CM

## 2019-07-02 RX ORDER — LABETALOL 100 MG/1
100 TABLET, FILM COATED ORAL 2 TIMES DAILY
Qty: 60 TABLET | Refills: 2 | Status: SHIPPED | OUTPATIENT
Start: 2019-07-02 | End: 2019-07-09 | Stop reason: SDUPTHER

## 2019-07-03 RX ORDER — LABETALOL 100 MG/1
TABLET, FILM COATED ORAL
Qty: 180 TABLET | Refills: 0 | Status: SHIPPED | OUTPATIENT
Start: 2019-07-03 | End: 2019-10-10 | Stop reason: SDUPTHER

## 2019-07-04 PROCEDURE — 93880 EXTRACRANIAL BILAT STUDY: CPT | Performed by: SURGERY

## 2019-07-09 ENCOUNTER — OFFICE VISIT (OUTPATIENT)
Dept: NEUROLOGY | Facility: CLINIC | Age: 74
End: 2019-07-09
Payer: COMMERCIAL

## 2019-07-09 VITALS
HEART RATE: 62 BPM | WEIGHT: 156 LBS | SYSTOLIC BLOOD PRESSURE: 164 MMHG | HEIGHT: 58 IN | BODY MASS INDEX: 32.75 KG/M2 | DIASTOLIC BLOOD PRESSURE: 80 MMHG

## 2019-07-09 DIAGNOSIS — Z86.73 HISTORY OF CVA (CEREBROVASCULAR ACCIDENT): ICD-10-CM

## 2019-07-09 DIAGNOSIS — M51.26 LUMBAR DISC HERNIATION: Primary | ICD-10-CM

## 2019-07-09 DIAGNOSIS — M47.816 SPONDYLOSIS OF LUMBAR REGION WITHOUT MYELOPATHY OR RADICULOPATHY: ICD-10-CM

## 2019-07-09 PROCEDURE — 99213 OFFICE O/P EST LOW 20 MIN: CPT | Performed by: PSYCHIATRY & NEUROLOGY

## 2019-07-09 NOTE — PROGRESS NOTES
Progress Note - Neurology   Olga Wayne 69 y o  female MRN: [id number]  Unit/Bed#:  Encounter: [id number]      Subjective:   Patient is here for a follow-up visit with a history of CVA several years ago, remains on Plavix and blood pressure medications and has been stable  In the recent past she was also experiencing worsening low back pain and subsequently has had significant relief with the help of physical therapy and uses tramadol on a p r n  Basis  MRI of the lumbar spine showed multilevel degenerative disc disease, facet disease, neural foraminal stenosis, as well as an L1-L2 disc protrusion with possible impingement on the L2 nerve root  Patient has had significant relief of symptoms, and is on a home exercise program   She denies any new neurological symptoms  ROS:   Review of Systems   Constitutional: Negative  Negative for appetite change and fever  HENT: Positive for trouble swallowing  Negative for hearing loss, tinnitus and voice change  Eyes: Negative  Negative for photophobia and pain  Respiratory: Negative  Negative for shortness of breath  Cardiovascular: Negative  Negative for chest pain, palpitations and leg swelling  Gastrointestinal: Negative  Negative for constipation, nausea and vomiting  Endocrine: Negative  Negative for cold intolerance and heat intolerance  Genitourinary: Negative  Negative for dysuria, frequency and urgency  Musculoskeletal: Positive for back pain, gait problem and neck pain  Negative for myalgias  Leg pain   Skin: Negative  Negative for rash  Neurological: Positive for weakness and numbness  Negative for dizziness, tremors, seizures, syncope, facial asymmetry, speech difficulty, light-headedness and headaches  Hematological: Negative  Does not bruise/bleed easily  Psychiatric/Behavioral: Positive for decreased concentration  Negative for confusion, hallucinations and sleep disturbance         Vitals:   Vitals:    07/09/19 1023   BP: 164/80   BP Location: Left arm   Patient Position: Sitting   Cuff Size: Adult   Pulse: 62   Weight: 70 8 kg (156 lb)   Height: 4' 10" (1 473 m)   ,Body mass index is 32 6 kg/m²  MEDS:      Current Outpatient Medications:     amLODIPine (NORVASC) 5 mg tablet, Take 5 mg by mouth 2 (two) times a day, Disp: , Rfl:     Cholecalciferol (VITAMIN D3) 1000 units CAPS, Take 1 capsule by mouth every other day , Disp: , Rfl:     clopidogrel (PLAVIX) 75 mg tablet, Take 1 tablet (75 mg total) by mouth daily, Disp: 90 tablet, Rfl: 1    ferrous sulfate 325 (65 Fe) mg tablet, Take 325 mg by mouth daily, Disp: , Rfl:     folic acid (FOLVITE) 439 MCG tablet, Take 800 mcg by mouth daily , Disp: , Rfl:     labetalol (NORMODYNE) 100 mg tablet, TAKE 1 TABLET BY MOUTH TWICE A DAY, Disp: 180 tablet, Rfl: 0    losartan (COZAAR) 100 MG tablet, Take 100 mg by mouth every morning , Disp: , Rfl:     losartan (COZAAR) 50 mg tablet, Take 1 tablet by mouth daily  , Disp: , Rfl:     Omega-3 Fatty Acids (SM FISH OIL) 1200 MG CAPS, Take by mouth 2 (two) times a day, Disp: , Rfl:     traMADol (ULTRAM) 50 mg tablet, Take 1 tablet (50 mg total) by mouth every 8 (eight) hours as needed for moderate pain, Disp: 60 tablet, Rfl: 1  :    Physical Exam:  General appearance: alert, appears stated age and cooperative  Head: Normocephalic, without obvious abnormality, atraumatic    On examination there is no evidence of any significant hip or lumbosacral tenderness with no new deficits noted on cranial nerve, motor or sensory exam   Her gait is normal based  Lab Results: I have personally reviewed pertinent reports  Imaging Studies: I have personally reviewed pertinent reports  Assessment:  1  Chronic low back pain with lumbar degenerative disc disease  2  History of CVA  Plan:  Patient is advised to continue home exercise program, she may continue with tramadol which she uses very sparingly    She is advised to return back to see me in 6 months  Tight blood pressure control is recommended and she will continue with Plavix 75 mg daily  7/9/2019,10:35 AM    Dictation voice to text software has been used in the creation of this document  Please consider this in light of any contextual or grammatical errors

## 2019-08-26 ENCOUNTER — HOSPITAL ENCOUNTER (OUTPATIENT)
Dept: NON INVASIVE DIAGNOSTICS | Facility: CLINIC | Age: 74
Discharge: HOME | End: 2019-08-26
Payer: COMMERCIAL

## 2019-08-26 DIAGNOSIS — I70.0 AORTOILIAC STENOSIS, LEFT (HCC): ICD-10-CM

## 2019-08-26 PROCEDURE — 93979 VASCULAR STUDY: CPT

## 2019-08-26 PROCEDURE — 93923 UPR/LXTR ART STDY 3+ LVLS: CPT

## 2019-08-27 PROCEDURE — 93979 VASCULAR STUDY: CPT | Performed by: SURGERY

## 2019-09-25 DIAGNOSIS — M10.272 ACUTE DRUG-INDUCED GOUT OF LEFT FOOT: ICD-10-CM

## 2019-09-25 RX ORDER — LABETALOL 100 MG/1
TABLET, FILM COATED ORAL
Qty: 180 TABLET | Refills: 0 | Status: SHIPPED | OUTPATIENT
Start: 2019-09-25 | End: 2020-01-06

## 2019-10-10 DIAGNOSIS — I65.1 BASILAR ARTERY STENOSIS: ICD-10-CM

## 2019-10-10 DIAGNOSIS — I15.2 HYPERTENSION ASSOCIATED WITH DIABETES (HCC): Primary | ICD-10-CM

## 2019-10-10 DIAGNOSIS — I73.9 CLAUDICATION IN PERIPHERAL VASCULAR DISEASE (HCC): ICD-10-CM

## 2019-10-10 DIAGNOSIS — Z86.73 HISTORY OF CVA (CEREBROVASCULAR ACCIDENT): ICD-10-CM

## 2019-10-10 DIAGNOSIS — E11.59 HYPERTENSION ASSOCIATED WITH DIABETES (HCC): Primary | ICD-10-CM

## 2019-10-10 DIAGNOSIS — M10.272 ACUTE DRUG-INDUCED GOUT OF LEFT FOOT: ICD-10-CM

## 2019-10-10 RX ORDER — CLOPIDOGREL BISULFATE 75 MG/1
75 TABLET ORAL DAILY
Qty: 90 TABLET | Refills: 1 | Status: SHIPPED | OUTPATIENT
Start: 2019-10-10 | End: 2020-07-20 | Stop reason: SDUPTHER

## 2019-10-10 RX ORDER — AMLODIPINE BESYLATE 5 MG/1
5 TABLET ORAL 2 TIMES DAILY
Qty: 60 TABLET | Refills: 11 | Status: SHIPPED | OUTPATIENT
Start: 2019-10-10 | End: 2020-08-05 | Stop reason: SDUPTHER

## 2019-10-10 RX ORDER — LABETALOL 100 MG/1
100 TABLET, FILM COATED ORAL 2 TIMES DAILY
Qty: 180 TABLET | Refills: 0 | Status: SHIPPED | OUTPATIENT
Start: 2019-10-10 | End: 2019-10-22 | Stop reason: SDUPTHER

## 2019-10-22 ENCOUNTER — OFFICE VISIT (OUTPATIENT)
Dept: INTERNAL MEDICINE CLINIC | Facility: CLINIC | Age: 74
End: 2019-10-22
Payer: COMMERCIAL

## 2019-10-22 VITALS
DIASTOLIC BLOOD PRESSURE: 80 MMHG | HEART RATE: 85 BPM | OXYGEN SATURATION: 98 % | BODY MASS INDEX: 34.38 KG/M2 | TEMPERATURE: 97.5 F | RESPIRATION RATE: 16 BRPM | HEIGHT: 58 IN | SYSTOLIC BLOOD PRESSURE: 200 MMHG | WEIGHT: 163.8 LBS

## 2019-10-22 DIAGNOSIS — I10 HYPERTENSION, UNSPECIFIED TYPE: ICD-10-CM

## 2019-10-22 DIAGNOSIS — N18.30 CKD (CHRONIC KIDNEY DISEASE), STAGE III (HCC): ICD-10-CM

## 2019-10-22 DIAGNOSIS — F17.200 TOBACCO USE DISORDER: ICD-10-CM

## 2019-10-22 DIAGNOSIS — I73.9 CLAUDICATION IN PERIPHERAL VASCULAR DISEASE (HCC): ICD-10-CM

## 2019-10-22 DIAGNOSIS — R31.0 GROSS HEMATURIA: Primary | ICD-10-CM

## 2019-10-22 DIAGNOSIS — I65.1 BASILAR ARTERY STENOSIS: ICD-10-CM

## 2019-10-22 DIAGNOSIS — I15.2 HYPERTENSION ASSOCIATED WITH DIABETES (HCC): ICD-10-CM

## 2019-10-22 DIAGNOSIS — Z12.2 ENCOUNTER FOR SCREENING FOR LUNG CANCER: ICD-10-CM

## 2019-10-22 DIAGNOSIS — E11.59 HYPERTENSION ASSOCIATED WITH DIABETES (HCC): ICD-10-CM

## 2019-10-22 DIAGNOSIS — Z12.11 SCREEN FOR COLON CANCER: ICD-10-CM

## 2019-10-22 DIAGNOSIS — F41.8 DEPRESSION WITH ANXIETY: ICD-10-CM

## 2019-10-22 DIAGNOSIS — I70.1 ATHEROSCLEROSIS OF RENAL ARTERY (HCC): ICD-10-CM

## 2019-10-22 DIAGNOSIS — E55.9 VITAMIN D DEFICIENCY: ICD-10-CM

## 2019-10-22 DIAGNOSIS — I70.1 RENAL ARTERY STENOSIS (HCC): ICD-10-CM

## 2019-10-22 DIAGNOSIS — E87.6 HYPOKALEMIA: ICD-10-CM

## 2019-10-22 DIAGNOSIS — G62.9 POLYNEUROPATHY: ICD-10-CM

## 2019-10-22 DIAGNOSIS — M1A.3720 CHRONIC GOUT DUE TO RENAL IMPAIRMENT INVOLVING TOE OF LEFT FOOT WITHOUT TOPHUS: ICD-10-CM

## 2019-10-22 DIAGNOSIS — E11.21 TYPE 2 DIABETES MELLITUS WITH DIABETIC NEPHROPATHY, WITHOUT LONG-TERM CURRENT USE OF INSULIN (HCC): ICD-10-CM

## 2019-10-22 DIAGNOSIS — I70.0 AORTOILIAC STENOSIS, LEFT (HCC): ICD-10-CM

## 2019-10-22 DIAGNOSIS — Z86.73 HX-TIA (TRANSIENT ISCHEMIC ATTACK): ICD-10-CM

## 2019-10-22 DIAGNOSIS — M48.061 SPINAL STENOSIS OF LUMBAR REGION WITHOUT NEUROGENIC CLAUDICATION: ICD-10-CM

## 2019-10-22 DIAGNOSIS — R09.89 DECREASED PULSES IN FEET: ICD-10-CM

## 2019-10-22 DIAGNOSIS — Z28.21 REFUSED INFLUENZA VACCINE: ICD-10-CM

## 2019-10-22 PROBLEM — E83.52 HYPERCALCEMIA: Status: ACTIVE | Noted: 2019-06-25

## 2019-10-22 PROBLEM — R80.8 OTHER PROTEINURIA: Status: ACTIVE | Noted: 2019-07-01

## 2019-10-22 PROBLEM — E78.5 HYPERLIPIDEMIA, UNSPECIFIED: Status: ACTIVE | Noted: 2017-12-04

## 2019-10-22 PROBLEM — E66.9 OBESITY WITH BODY MASS INDEX 30 OR GREATER: Status: ACTIVE | Noted: 2017-12-04

## 2019-10-22 PROBLEM — M47.817 LUMBOSACRAL SPONDYLOSIS WITHOUT MYELOPATHY: Status: ACTIVE | Noted: 2019-01-29

## 2019-10-22 PROBLEM — L28.0 NEURODERMATITIS: Status: ACTIVE | Noted: 2017-12-04

## 2019-10-22 PROBLEM — M51.26 HERNIATED LUMBAR INTERVERTEBRAL DISC: Status: ACTIVE | Noted: 2019-01-22

## 2019-10-22 PROBLEM — R05.9 COUGH: Status: RESOLVED | Noted: 2019-05-13 | Resolved: 2019-10-22

## 2019-10-22 PROCEDURE — 99215 OFFICE O/P EST HI 40 MIN: CPT | Performed by: INTERNAL MEDICINE

## 2019-10-22 RX ORDER — CLONIDINE 0.2 MG/24H
1 PATCH, EXTENDED RELEASE TRANSDERMAL WEEKLY
Qty: 12 PATCH | Refills: 3 | Status: SHIPPED | OUTPATIENT
Start: 2019-10-22 | End: 2020-01-22

## 2019-10-22 NOTE — PROGRESS NOTES
Assessment/Plan:  Blood on tissue paper after urinating  1  Blood on tissue paper after urinating which is gross hematuria will refer to Urology for further evaluation will get ultrasound of the kidneys will also check urinalysis with reflex, evaluation is necessary even though she is on Plavix  2  Hypertension is not control has close follow-up with Dr [facility] will be seeing him next week will add Catapres patch once a week to her medications blood pressure goal less than 130/80 has been difficult to control for many years  3  Patient with tobacco abuse strongly recommend cessation  4  Patient with history of renal artery stenosis decreased pulses in her feet probable peripheral vascular disease will be getting vascular studies of her lower extremities renal arteries and aortic branch with known aortoiliac stenosis on the left  5  Type 2 diabetes will check A1c in 4 months last A1c was 6  6  Trochanteric bursitis not severe at this time has classic symptoms of she has more symptoms may return for injection to use ice at the end of the day  7  Had CT for lung cancer screening program ordered has not been done will reorder again encouraged have done             Diagnoses and all orders for this visit:    Gross hematuria on tissue paper after wiping  -     UA w Reflex to Microscopic w Reflex to Culture  -     Cancel: US kidney and bladder; Future  -     Ambulatory referral to Urology; Future  -     CBC and differential; Future  -     Comprehensive metabolic panel; Future  -     Hemoglobin A1C; Future  -     TSH, 3rd generation with Free T4 reflex; Future  -     Vitamin D 25 hydroxy; Future  -     Magnesium; Future  -     Uric acid; Future  -     Microalbumin / creatinine urine ratio; Future  -     UA w Reflex to Microscopic w Reflex to Culture; Future  -     US kidney and bladder; Future    Tobacco use disorder  -     CBC and differential; Future  -     Comprehensive metabolic panel;  Future  - Hemoglobin A1C; Future  -     TSH, 3rd generation with Free T4 reflex; Future  -     Vitamin D 25 hydroxy; Future  -     Magnesium; Future  -     Uric acid; Future  -     Microalbumin / creatinine urine ratio; Future  -     UA w Reflex to Microscopic w Reflex to Culture; Future    Vitamin D deficiency  -     CBC and differential; Future  -     Comprehensive metabolic panel; Future  -     Hemoglobin A1C; Future  -     TSH, 3rd generation with Free T4 reflex; Future  -     Vitamin D 25 hydroxy; Future  -     Magnesium; Future  -     Uric acid; Future  -     Microalbumin / creatinine urine ratio; Future  -     UA w Reflex to Microscopic w Reflex to Culture; Future    CKD (chronic kidney disease), stage III (HCC)  -     CBC and differential; Future  -     Comprehensive metabolic panel; Future  -     Hemoglobin A1C; Future  -     TSH, 3rd generation with Free T4 reflex; Future  -     Vitamin D 25 hydroxy; Future  -     Magnesium; Future  -     Uric acid; Future  -     Microalbumin / creatinine urine ratio; Future  -     UA w Reflex to Microscopic w Reflex to Culture; Future    Type 2 diabetes mellitus with diabetic nephropathy, without long-term current use of insulin (HCC)  -     CBC and differential; Future  -     Comprehensive metabolic panel; Future  -     Hemoglobin A1C; Future  -     TSH, 3rd generation with Free T4 reflex; Future  -     Vitamin D 25 hydroxy; Future  -     Magnesium; Future  -     Uric acid; Future  -     Microalbumin / creatinine urine ratio; Future  -     UA w Reflex to Microscopic w Reflex to Culture; Future    Hx-TIA (transient ischemic attack)  -     CBC and differential; Future  -     Comprehensive metabolic panel; Future  -     Hemoglobin A1C; Future  -     TSH, 3rd generation with Free T4 reflex; Future  -     Vitamin D 25 hydroxy; Future  -     Magnesium; Future  -     Uric acid; Future  -     Microalbumin / creatinine urine ratio;  Future  -     UA w Reflex to Microscopic w Reflex to Culture; Future    Hypertension, unspecified type  -     CBC and differential; Future  -     Comprehensive metabolic panel; Future  -     Hemoglobin A1C; Future  -     TSH, 3rd generation with Free T4 reflex; Future  -     Vitamin D 25 hydroxy; Future  -     Magnesium; Future  -     Uric acid; Future  -     Microalbumin / creatinine urine ratio; Future  -     UA w Reflex to Microscopic w Reflex to Culture; Future  -     cloNIDine (CATAPRES-TTS-2) 0 2 mg/24 hr; Place 1 patch (0 2 mg total) on the skin once a week    Hypertension associated with diabetes ([facility] [address] )  -     CBC and differential; Future  -     Comprehensive metabolic panel; Future  -     Hemoglobin A1C; Future  -     TSH, 3rd generation with Free T4 reflex; Future  -     Vitamin D 25 hydroxy; Future  -     Magnesium; Future  -     Uric acid; Future  -     Microalbumin / creatinine urine ratio; Future  -     UA w Reflex to Microscopic w Reflex to Culture; Future    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  -     CBC and differential; Future  -     Comprehensive metabolic panel; Future  -     Hemoglobin A1C; Future  -     TSH, 3rd generation with Free T4 reflex; Future  -     Vitamin D 25 hydroxy; Future  -     Magnesium; Future  -     Uric acid; Future  -     Microalbumin / creatinine urine ratio; Future  -     UA w Reflex to Microscopic w Reflex to Culture; Future    Depression with anxiety  -     CBC and differential; Future  -     Comprehensive metabolic panel; Future  -     Hemoglobin A1C; Future  -     TSH, 3rd generation with Free T4 reflex; Future  -     Vitamin D 25 hydroxy; Future  -     Magnesium; Future  -     Uric acid; Future  -     Microalbumin / creatinine urine ratio; Future  -     UA w Reflex to Microscopic w Reflex to Culture; Future    Spinal stenosis of lumbar region without neurogenic claudication  -     CBC and differential; Future  -     Comprehensive metabolic panel;  Future  -     Hemoglobin A1C; Future  -     TSH, 3rd generation with Free T4 reflex; Future  -     Vitamin D 25 hydroxy; Future  -     Magnesium; Future  -     Uric acid; Future  -     Microalbumin / creatinine urine ratio; Future  -     UA w Reflex to Microscopic w Reflex to Culture; Future    Basilar artery stenosis  -     CBC and differential; Future  -     Comprehensive metabolic panel; Future  -     Hemoglobin A1C; Future  -     TSH, 3rd generation with Free T4 reflex; Future  -     Vitamin D 25 hydroxy; Future  -     Magnesium; Future  -     Uric acid; Future  -     Microalbumin / creatinine urine ratio; Future  -     UA w Reflex to Microscopic w Reflex to Culture; Future    BMI 34 0-34 9,adult  -     CBC and differential; Future  -     Comprehensive metabolic panel; Future  -     Hemoglobin A1C; Future  -     TSH, 3rd generation with Free T4 reflex; Future  -     Vitamin D 25 hydroxy; Future  -     Magnesium; Future  -     Uric acid; Future  -     Microalbumin / creatinine urine ratio; Future  -     UA w Reflex to Microscopic w Reflex to Culture; Future    Hypokalemia  -     CBC and differential; Future  -     Comprehensive metabolic panel; Future  -     Hemoglobin A1C; Future  -     TSH, 3rd generation with Free T4 reflex; Future  -     Vitamin D 25 hydroxy; Future  -     Magnesium; Future  -     Uric acid; Future  -     Microalbumin / creatinine urine ratio; Future  -     UA w Reflex to Microscopic w Reflex to Culture; Future    Decreased pulses in feet  -     VAS lower limb arterial duplex, complete bilateral; Future    Screen for colon cancer  -     Cologuard; Future    Refused influenza vaccine    Aortoiliac stenosis, left (HCC)  -     VAS lower limb arterial duplex, complete bilateral; Future  -     US abdominal aorta; Future    Claudication in peripheral vascular disease (HCC)  -     VAS lower limb arterial duplex, complete bilateral; Future    Polyneuropathy    Renal artery stenosis ([facility] [address] )  -     US abdominal aorta;  Future  - VAS renal artery complete; Future    Atherosclerosis of renal artery ([facility] [address] )    Encounter for screening for lung cancer  -     CT lung screening program; Future      Patient's shoes and socks removed  Right Foot/Ankle   Right Foot Inspection  Skin Exam: skin normal and skin intact no dry skin, no warmth, no callus, no erythema, no maceration, no abnormal color, no pre-ulcer, no ulcer and no callus                          Toe Exam: no swelling, no tenderness, erythema and  no right toe deformity  Sensory   Vibration: intact  Proprioception: intact   Monofilament testing: intact  Vascular    The right DP pulse is 1+  The right PT pulse is 1+  Right Toe  - Comprehensive Exam  Ecchymosis: none  Swelling: none   Tenderness: none         Left Foot/Ankle  Left Foot Inspection  Skin Exam: skin normal and skin intactno dry skin, no warmth, no erythema, no maceration, normal color, no pre-ulcer, no ulcer and no callus                         Toe Exam: no swelling, no tenderness, no erythema and no left toe deformity                   Sensory   Vibration: intact  Proprioception: intact  Monofilament: intact  Vascular    The left DP pulse is 1+  The left PT pulse is 1+  Left Toe  - Comprehensive Exam  Ecchymosis: none  Swelling: none   Tenderness: none       Assign Risk Category:  No deformity present; Loss of protective sensation; Weak pulses       Risk: 0    The patient was counseled regarding instructions for management, risk factor reductions, patient and family education,impressions, risks and benefits of treatment options, side effects of medications, importance of compliance with treatment  The treatment plan was reviewed with the patient/guardian and patient/guardian understands and agrees with the treatment plan              Current Outpatient Medications:     amLODIPine (NORVASC) 5 mg tablet, Take 1 tablet (5 mg total) by mouth 2 (two) times a day, Disp: 60 tablet, Rfl: 11    Cholecalciferol (VITAMIN D3) 1000 units CAPS, Take 1 capsule by mouth every other day , Disp: , Rfl:     clopidogrel (PLAVIX) 75 mg tablet, Take 1 tablet (75 mg total) by mouth daily, Disp: 90 tablet, Rfl: 1    ferrous sulfate 325 (65 Fe) mg tablet, Take 325 mg by mouth daily, Disp: , Rfl:     folic acid (FOLVITE) 493 MCG tablet, Take 800 mcg by mouth daily , Disp: , Rfl:     labetalol (NORMODYNE) 100 mg tablet, TAKE 1 TABLET BY MOUTH TWICE A DAY, Disp: 180 tablet, Rfl: 0    losartan (COZAAR) 100 MG tablet, Take 100 mg by mouth every morning , Disp: , Rfl:     losartan (COZAAR) 50 mg tablet, Take 1 tablet by mouth daily  , Disp: , Rfl:     Omega-3 Fatty Acids (SM FISH OIL) 1200 MG CAPS, Take by mouth 2 (two) times a day, Disp: , Rfl:     traMADol (ULTRAM) 50 mg tablet, Take 1 tablet (50 mg total) by mouth every 8 (eight) hours as needed for moderate pain, Disp: 60 tablet, Rfl: 1    cloNIDine (CATAPRES-TTS-2) 0 2 mg/24 hr, Place 1 patch (0 2 mg total) on the skin once a week, Disp: 12 patch, Rfl: 3    Subjective:      Patient ID: Vane Torre is a 69 y o  female  Blood on tissue paper 1 month ago intermittent tolerating medications well has numbness on her feet      The following portions of the patient's history were reviewed and updated as appropriate:   She has a past medical history of Arthritis, Coronary artery disease, Diabetes mellitus ([address] ), Endometriosis, High cholesterol, Hypertension, Kidney disease, chronic, stage III (moderate, EGFR 30-59 ml/min) ([facility]), Lumbar disc herniation, Peripheral vascular disease ([address] ), Shoulder injury, Spinal stenosis, and Stroke ([facility] [address] )  ,  does not have any pertinent problems on file  ,   has a past surgical history that includes Rotator cuff repair (Left) and Tonsillectomy  ,  family history includes Arthritis in her brother; Heart defect in her father; Heart disease in her mother; Hyperlipidemia in her mother; Hypertension in her father and mother; Other in her brother; Stroke in her sister  ,   reports that she has been smoking cigarettes  She has been smoking about 1 00 pack per day  She has never used smokeless tobacco  She reports that she does not drink alcohol or use drugs  ,  is allergic to atorvastatin; colesevelam; colestipol; ezetimibe; fenofibrate; pollen extract; rosuvastatin; and statins       Review of Systems   Constitutional: Negative for appetite change, chills, fatigue, fever and unexpected weight change  HENT: Negative for congestion, ear pain, facial swelling, hearing loss, mouth sores, nosebleeds, postnasal drip, rhinorrhea, sinus pain, sore throat, trouble swallowing and voice change  Eyes: Negative for pain, discharge, redness and visual disturbance  Respiratory: Negative for apnea, chest tightness, shortness of breath, wheezing and stridor  Cardiovascular: Negative for chest pain, palpitations and leg swelling  Gastrointestinal: Negative for abdominal distention, abdominal pain, blood in stool, constipation, diarrhea and vomiting  Endocrine: Negative for cold intolerance, heat intolerance, polydipsia, polyphagia and polyuria  Genitourinary: Positive for hematuria  Negative for difficulty urinating, dysuria, flank pain, frequency, genital sores and urgency  Musculoskeletal: Negative for arthralgias and back pain  Skin: Negative for rash and wound  Allergic/Immunologic: Negative for environmental allergies, food allergies and immunocompromised state  Neurological: Negative for dizziness, tremors, seizures, syncope, facial asymmetry, speech difficulty, weakness, light-headedness, numbness and headaches  Hematological: Negative for adenopathy  Does not bruise/bleed easily  Psychiatric/Behavioral: Negative for agitation, behavioral problems, dysphoric mood, hallucinations, self-injury, sleep disturbance and suicidal ideas  The patient is not hyperactive            Objective:  BP (!) 200/80 (BP Location: Left arm, Patient Position: Sitting)   Pulse 85   Temp 97 5 °F (36 4 °C) (Tympanic)   Resp 16   Ht 4' 10" (1 473 m)   Wt 74 3 kg (163 lb 12 8 oz)   LMP  (LMP Unknown)   SpO2 98%   BMI 34 23 kg/m²     Lab Review  Orders Only on 05/15/2019   Component Date Value    White Blood Cell Count 05/15/2019 10 2     Red Blood Cell Count 05/15/2019 4 53     Hemoglobin 05/15/2019 13 6     HCT 05/15/2019 40 9     MCV 05/15/2019 90     MCH 05/15/2019 30 0     MCHC 05/15/2019 33 3     RDW 05/15/2019 14 5     Platelet Count 90/91/1985 225     Neutrophils 05/15/2019 69     Lymphocytes 05/15/2019 17     Monocytes 05/15/2019 11     Eosinophils 05/15/2019 2     Basophils PCT 05/15/2019 0     Neutrophils (Absolute) 05/15/2019 7 1*    Lymphocytes (Absolute) 05/15/2019 1 8     Monocytes (Absolute) 05/15/2019 1 1*    Eosinophils (Absolute) 05/15/2019 0 2     Basophils ABS 05/15/2019 0 0     Immature Granulocytes 05/15/2019 1     Immature Granulocytes (A* 05/15/2019 0 1     Glucose, Random 05/15/2019 99     BUN 05/15/2019 24     Creatinine 05/15/2019 1 42*    eGFR Non  05/15/2019 37*    eGFR  05/15/2019 43*    SL AMB BUN/CREATININE RA* 05/15/2019 17     Sodium 05/15/2019 142     Potassium 05/15/2019 4 1     Chloride 05/15/2019 103     CO2 05/15/2019 22     CALCIUM 05/15/2019 9 4     Protein, Total 05/15/2019 6 8     Albumin 05/15/2019 4 3     Globulin, Total 05/15/2019 2 5     Albumin/Globulin Ratio 05/15/2019 1 7     TOTAL BILIRUBIN 05/15/2019 0 3     Alk Phos Isoenzymes 05/15/2019 96     AST 05/15/2019 14     ALT 05/15/2019 13     Cholesterol, Total 05/15/2019 316*    Triglycerides 05/15/2019 164*    HDL 05/15/2019 52     VLDL Cholesterol Calcula* 05/15/2019 33     LDL Direct 05/15/2019 231*    Comment 05/15/2019 Comment     LDL Cholesterol 05/15/2019 250*    Hemoglobin A1C 05/15/2019 6 0*    Estimated Average Glucose 05/15/2019 126     TSH 05/15/2019 3 980    Orders Only on 05/15/2019   Component Date Value    Hemoglobin A1C 05/15/2019 6 0          Imaging  @[id number]@     VAS lower limb arterial duplex, complete bilateral    (Results Pending)   US abdominal aorta    (Results Pending)   US kidney and bladder    (Results Pending)   VAS renal artery complete    (Results Pending)   CT lung screening program    (Results Pending)     No results found for this or any previous visit  Physical Exam   Constitutional: She is oriented to person, place, and time  She appears well-developed  HENT:   Right Ear: External ear normal    Left Ear: External ear normal    Eyes: Right eye exhibits no discharge  Left eye exhibits no discharge  No scleral icterus  Neck: Carotid bruit is not present  No tracheal deviation present  No thyroid mass and no thyromegaly present  Cardiovascular: Normal rate, regular rhythm, normal heart sounds and intact distal pulses  Exam reveals no gallop and no friction rub  Pulses are weak pulses  No murmur heard  Pulses:       Dorsalis pedis pulses are 1+ on the right side, and 1+ on the left side  Posterior tibial pulses are 1+ on the right side, and 1+ on the left side  Pulmonary/Chest: No respiratory distress  She has no wheezes  She has no rales  Musculoskeletal: She exhibits no edema  Feet:   Right Foot:   Skin Integrity: Negative for ulcer, skin breakdown, erythema, warmth, callus or dry skin  Left Foot:   Skin Integrity: Negative for ulcer, skin breakdown, erythema, warmth, callus or dry skin  Lymphadenopathy:     She has no cervical adenopathy  Neurological: She is alert and oriented to person, place, and time  Coordination normal    Psychiatric: She has a normal mood and affect  Her behavior is normal  Judgment and thought content normal    Nursing note and vitals reviewed  BMI Counseling: Body mass index is 34 23 kg/m²   The BMI is above normal  Nutrition recommendations include reducing portion sizes, decreasing overall calorie intake, 3-5 servings of fruits/vegetables daily, reducing fast food intake, consuming healthier snacks, decreasing soda and/or juice intake, moderation in carbohydrate intake, increasing intake of lean protein and reducing intake of saturated fat and trans fat  Exercise recommendations include exercising 3-5 times per week

## 2019-10-22 NOTE — LETTER
October 22, 2019     Luis Cunha MD  [address] [facility]  [address]  [id number]    Patient: Kam Polanco   YOB: 1945   Date of Visit: 10/22/2019       Dear Dr Lucia Horvath: Thank you for referring Anne Ramirez to me for evaluation  Below are my notes for this consultation  If you have questions, please do not hesitate to call me  I look forward to following your patient along with you  Sincerely,        Lazaro Freitas DO        CC: No Recipients  Lazaro Freitas DO  10/22/2019 12:04 PM  Sign at close encounter  Assessment/Plan:  Blood on tissue paper after urinating  1  Blood on tissue paper after urinating which is gross hematuria will refer to Urology for further evaluation will get ultrasound of the kidneys will also check urinalysis with reflex, evaluation is necessary even though she is on Plavix  2  Hypertension is not control has close follow-up with Dr Lucia Horvath Nephrology will be seeing him next week will add Catapres patch once a week to her medications blood pressure goal less than 130/80 has been difficult to control for many years  3  Patient with tobacco abuse strongly recommend cessation  4  Patient with history of renal artery stenosis decreased pulses in her feet probable peripheral vascular disease will be getting vascular studies of her lower extremities renal arteries and aortic branch with 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             Diagnoses and all orders for this visit:    Gross hematuria on tissue paper after wiping  -     UA w Reflex to Microscopic w Reflex to Culture  -     Cancel: US kidney and bladder; Future  -     Ambulatory referral to Urology; Future  -     CBC and differential; Future  -     Comprehensive metabolic panel; Future  -     Hemoglobin A1C; Future  -     TSH, 3rd generation with Free T4 reflex; Future  -     Vitamin D 25 hydroxy; Future  -     Magnesium; Future  -     Uric acid; Future  -     Microalbumin / creatinine urine ratio; Future  -     UA w Reflex to Microscopic w Reflex to Culture; Future  -     US kidney and bladder; Future    Tobacco use disorder  -     CBC and differential; Future  -     Comprehensive metabolic panel; Future  -     Hemoglobin A1C; Future  -     TSH, 3rd generation with Free T4 reflex; Future  -     Vitamin D 25 hydroxy; Future  -     Magnesium; Future  -     Uric acid; Future  -     Microalbumin / creatinine urine ratio; Future  -     UA w Reflex to Microscopic w Reflex to Culture; Future    Vitamin D deficiency  -     CBC and differential; Future  -     Comprehensive metabolic panel; Future  -     Hemoglobin A1C; Future  -     TSH, 3rd generation with Free T4 reflex; Future  -     Vitamin D 25 hydroxy; Future  -     Magnesium; Future  -     Uric acid; Future  -     Microalbumin / creatinine urine ratio; Future  -     UA w Reflex to Microscopic w Reflex to Culture; Future    CKD (chronic kidney disease), stage III (HCC)  -     CBC and differential; Future  -     Comprehensive metabolic panel; Future  -     Hemoglobin A1C; Future  -     TSH, 3rd generation with Free T4 reflex; Future  -     Vitamin D 25 hydroxy; Future  -     Magnesium; Future  -     Uric acid; Future  -     Microalbumin / creatinine urine ratio; Future  -     UA w Reflex to Microscopic w Reflex to Culture; Future    Type 2 diabetes mellitus with diabetic nephropathy, without long-term current use of insulin (HCC)  -     CBC and differential; Future  -     Comprehensive metabolic panel; Future  -     Hemoglobin A1C; Future  -     TSH, 3rd generation with Free T4 reflex;  Future  -     Vitamin D 25 hydroxy; Future  -     Magnesium; Future  -     Uric acid; Future  -     Microalbumin / creatinine urine ratio; Future  -     UA w Reflex to Microscopic w Reflex to Culture; Future    Hx-TIA (transient ischemic attack)  -     CBC and differential; Future  -     Comprehensive metabolic panel; Future  -     Hemoglobin A1C; Future  -     TSH, 3rd generation with Free T4 reflex; Future  -     Vitamin D 25 hydroxy; Future  -     Magnesium; Future  -     Uric acid; Future  -     Microalbumin / creatinine urine ratio; Future  -     UA w Reflex to Microscopic w Reflex to Culture; Future    Hypertension, unspecified type  -     CBC and differential; Future  -     Comprehensive metabolic panel; Future  -     Hemoglobin A1C; Future  -     TSH, 3rd generation with Free T4 reflex; Future  -     Vitamin D 25 hydroxy; Future  -     Magnesium; Future  -     Uric acid; Future  -     Microalbumin / creatinine urine ratio; Future  -     UA w Reflex to Microscopic w Reflex to Culture; Future  -     cloNIDine (CATAPRES-TTS-2) 0 2 mg/24 hr; Place 1 patch (0 2 mg total) on the skin once a week    Hypertension associated with diabetes ([facility] [address] )  -     CBC and differential; Future  -     Comprehensive metabolic panel; Future  -     Hemoglobin A1C; Future  -     TSH, 3rd generation with Free T4 reflex; Future  -     Vitamin D 25 hydroxy; Future  -     Magnesium; Future  -     Uric acid; Future  -     Microalbumin / creatinine urine ratio; Future  -     UA w Reflex to Microscopic w Reflex to Culture; Future    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  -     CBC and differential; Future  -     Comprehensive metabolic panel; Future  -     Hemoglobin A1C; Future  -     TSH, 3rd generation with Free T4 reflex; Future  -     Vitamin D 25 hydroxy; Future  -     Magnesium; Future  -     Uric acid; Future  -     Microalbumin / creatinine urine ratio;  Future  -     UA w Reflex to Microscopic w Reflex to Culture; Future    Depression with anxiety  -     CBC and differential; Future  -     Comprehensive metabolic panel; Future  -     Hemoglobin A1C; Future  -     TSH, 3rd generation with Free T4 reflex; Future  -     Vitamin D 25 hydroxy; Future  -     Magnesium; Future  -     Uric acid; Future  -     Microalbumin / creatinine urine ratio; Future  -     UA w Reflex to Microscopic w Reflex to Culture; Future    Spinal stenosis of lumbar region without neurogenic claudication  -     CBC and differential; Future  -     Comprehensive metabolic panel; Future  -     Hemoglobin A1C; Future  -     TSH, 3rd generation with Free T4 reflex; Future  -     Vitamin D 25 hydroxy; Future  -     Magnesium; Future  -     Uric acid; Future  -     Microalbumin / creatinine urine ratio; Future  -     UA w Reflex to Microscopic w Reflex to Culture; Future    Basilar artery stenosis  -     CBC and differential; Future  -     Comprehensive metabolic panel; Future  -     Hemoglobin A1C; Future  -     TSH, 3rd generation with Free T4 reflex; Future  -     Vitamin D 25 hydroxy; Future  -     Magnesium; Future  -     Uric acid; Future  -     Microalbumin / creatinine urine ratio; Future  -     UA w Reflex to Microscopic w Reflex to Culture; Future    BMI 34 0-34 9,adult  -     CBC and differential; Future  -     Comprehensive metabolic panel; Future  -     Hemoglobin A1C; Future  -     TSH, 3rd generation with Free T4 reflex; Future  -     Vitamin D 25 hydroxy; Future  -     Magnesium; Future  -     Uric acid; Future  -     Microalbumin / creatinine urine ratio; Future  -     UA w Reflex to Microscopic w Reflex to Culture; Future    Hypokalemia  -     CBC and differential; Future  -     Comprehensive metabolic panel; Future  -     Hemoglobin A1C; Future  -     TSH, 3rd generation with Free T4 reflex; Future  -     Vitamin D 25 hydroxy; Future  -     Magnesium; Future  -     Uric acid; Future  -     Microalbumin / creatinine urine ratio;  Future  -     UA w Reflex to Microscopic w Reflex to Culture; Future    Decreased pulses in feet  -     VAS lower limb arterial duplex, complete bilateral; Future    Screen for colon cancer  -     Cologuard; Future    Refused influenza vaccine    Aortoiliac stenosis, left (HCC)  -     VAS lower limb arterial duplex, complete bilateral; Future  -     US abdominal aorta; Future    Claudication in peripheral vascular disease (HCC)  -     VAS lower limb arterial duplex, complete bilateral; Future    Polyneuropathy    Renal artery stenosis ([facility] [address] )  -     US abdominal aorta; Future  -     VAS renal artery complete; Future    Atherosclerosis of renal artery ([facility] [address] )    Encounter for screening for lung cancer  -     CT lung screening program; Future      Patient's shoes and socks removed  Right Foot/Ankle   Right Foot Inspection  Skin Exam: skin normal and skin intact no dry skin, no warmth, no callus, no erythema, no maceration, no abnormal color, no pre-ulcer, no ulcer and no callus                          Toe Exam: no swelling, no tenderness, erythema and  no right toe deformity  Sensory   Vibration: intact  Proprioception: intact   Monofilament testing: intact  Vascular    The right DP pulse is 1+  The right PT pulse is 1+  Right Toe  - Comprehensive Exam  Ecchymosis: none  Swelling: none   Tenderness: none         Left Foot/Ankle  Left Foot Inspection  Skin Exam: skin normal and skin intactno dry skin, no warmth, no erythema, no maceration, normal color, no pre-ulcer, no ulcer and no callus                         Toe Exam: no swelling, no tenderness, no erythema and no left toe deformity                   Sensory   Vibration: intact  Proprioception: intact  Monofilament: intact  Vascular    The left DP pulse is 1+  The left PT pulse is 1+  Left Toe  - Comprehensive Exam  Ecchymosis: none  Swelling: none   Tenderness: none       Assign Risk Category:  No deformity present;  Loss of protective sensation; Weak pulses       Risk: 0    The patient was counseled regarding instructions for management, risk factor reductions, patient and family education,impressions, risks and benefits of treatment options, side effects of medications, importance of compliance with treatment  The treatment plan was reviewed with the patient/guardian and patient/guardian understands and agrees with the treatment plan  Current Outpatient Medications:     amLODIPine (NORVASC) 5 mg tablet, Take 1 tablet (5 mg total) by mouth 2 (two) times a day, Disp: 60 tablet, Rfl: 11    Cholecalciferol (VITAMIN D3) 1000 units CAPS, Take 1 capsule by mouth every other day , Disp: , Rfl:     clopidogrel (PLAVIX) 75 mg tablet, Take 1 tablet (75 mg total) by mouth daily, Disp: 90 tablet, Rfl: 1    ferrous sulfate 325 (65 Fe) mg tablet, Take 325 mg by mouth daily, Disp: , Rfl:     folic acid (FOLVITE) 910 MCG tablet, Take 800 mcg by mouth daily , Disp: , Rfl:     labetalol (NORMODYNE) 100 mg tablet, TAKE 1 TABLET BY MOUTH TWICE A DAY, Disp: 180 tablet, Rfl: 0    losartan (COZAAR) 100 MG tablet, Take 100 mg by mouth every morning , Disp: , Rfl:     losartan (COZAAR) 50 mg tablet, Take 1 tablet by mouth daily  , Disp: , Rfl:     Omega-3 Fatty Acids (SM FISH OIL) 1200 MG CAPS, Take by mouth 2 (two) times a day, Disp: , Rfl:     traMADol (ULTRAM) 50 mg tablet, Take 1 tablet (50 mg total) by mouth every 8 (eight) hours as needed for moderate pain, Disp: 60 tablet, Rfl: 1    cloNIDine (CATAPRES-TTS-2) 0 2 mg/24 hr, Place 1 patch (0 2 mg total) on the skin once a week, Disp: 12 patch, Rfl: 3    Subjective:      Patient ID: Mercy Smith is a 69 y o  female      Blood on tissue paper 1 month ago intermittent tolerating medications well has numbness on her feet      The following portions of the patient's history were reviewed and updated as appropriate:   She has a past medical history of Arthritis, Coronary artery disease, Diabetes mellitus ([address] ), Endometriosis, High cholesterol, Hypertension, Kidney disease, chronic, stage III (moderate, EGFR 30-59 ml/min) ([facility]), Lumbar disc herniation, Peripheral vascular disease ([facility] [address] ), Shoulder injury, Spinal stenosis, and Stroke ([facility] [address] )  ,  does not have any pertinent problems on file  ,   has a past surgical history that includes Rotator cuff repair (Left) and Tonsillectomy  ,  family history includes Arthritis in her brother; Heart defect in her father; Heart disease in her mother; Hyperlipidemia in her mother; Hypertension in her father and mother; Other in her brother; Stroke in her sister  ,   reports that she has been smoking cigarettes  She has been smoking about 1 00 pack per day  She has never used smokeless tobacco  She reports that she does not drink alcohol or use drugs  ,  is allergic to atorvastatin; colesevelam; colestipol; ezetimibe; fenofibrate; pollen extract; rosuvastatin; and statins       Review of Systems   Constitutional: Negative for appetite change, chills, fatigue, fever and unexpected weight change  HENT: Negative for congestion, ear pain, facial swelling, hearing loss, mouth sores, nosebleeds, postnasal drip, rhinorrhea, sinus pain, sore throat, trouble swallowing and voice change  Eyes: Negative for pain, discharge, redness and visual disturbance  Respiratory: Negative for apnea, chest tightness, shortness of breath, wheezing and stridor  Cardiovascular: Negative for chest pain, palpitations and leg swelling  Gastrointestinal: Negative for abdominal distention, abdominal pain, blood in stool, constipation, diarrhea and vomiting  Endocrine: Negative for cold intolerance, heat intolerance, polydipsia, polyphagia and polyuria  Genitourinary: Positive for hematuria  Negative for difficulty urinating, dysuria, flank pain, frequency, genital sores and urgency  Musculoskeletal: Negative for arthralgias and back pain  Skin: Negative for rash and wound     Allergic/Immunologic: Negative for environmental allergies, food allergies and immunocompromised state  Neurological: Negative for dizziness, tremors, seizures, syncope, facial asymmetry, speech difficulty, weakness, light-headedness, numbness and headaches  Hematological: Negative for adenopathy  Does not bruise/bleed easily  Psychiatric/Behavioral: Negative for agitation, behavioral problems, dysphoric mood, hallucinations, self-injury, sleep disturbance and suicidal ideas  The patient is not hyperactive            Objective:  BP (!) 200/80 (BP Location: Left arm, Patient Position: Sitting)   Pulse 85   Temp 97 5 °F (36 4 °C) (Tympanic)   Resp 16   Ht 4' 10" (1 473 m)   Wt 74 3 kg (163 lb 12 8 oz)   LMP  (LMP Unknown)   SpO2 98%   BMI 34 23 kg/m²      Lab Review  Orders Only on 05/15/2019   Component Date Value    White Blood Cell Count 05/15/2019 10 2     Red Blood Cell Count 05/15/2019 4 53     Hemoglobin 05/15/2019 13 6     HCT 05/15/2019 40 9     MCV 05/15/2019 90     MCH 05/15/2019 30 0     MCHC 05/15/2019 33 3     RDW 05/15/2019 14 5     Platelet Count 65/16/7114 225     Neutrophils 05/15/2019 69     Lymphocytes 05/15/2019 17     Monocytes 05/15/2019 11     Eosinophils 05/15/2019 2     Basophils PCT 05/15/2019 0     Neutrophils (Absolute) 05/15/2019 7 1*    Lymphocytes (Absolute) 05/15/2019 1 8     Monocytes (Absolute) 05/15/2019 1 1*    Eosinophils (Absolute) 05/15/2019 0 2     Basophils ABS 05/15/2019 0 0     Immature Granulocytes 05/15/2019 1     Immature Granulocytes (A* 05/15/2019 0 1     Glucose, Random 05/15/2019 99     BUN 05/15/2019 24     Creatinine 05/15/2019 1 42*    eGFR Non  05/15/2019 37*    eGFR  05/15/2019 43*    SL AMB BUN/CREATININE RA* 05/15/2019 17     Sodium 05/15/2019 142     Potassium 05/15/2019 4 1     Chloride 05/15/2019 103     CO2 05/15/2019 22     CALCIUM 05/15/2019 9 4     Protein, Total 05/15/2019 6 8     Albumin 05/15/2019 4 3     Globulin, Total 05/15/2019 2 5     Albumin/Globulin Ratio 05/15/2019 1 7     TOTAL BILIRUBIN 05/15/2019 0 3     Alk Phos Isoenzymes 05/15/2019 96     AST 05/15/2019 14     ALT 05/15/2019 13     Cholesterol, Total 05/15/2019 316*    Triglycerides 05/15/2019 164*    HDL 05/15/2019 52     VLDL Cholesterol Calcula* 05/15/2019 33     LDL Direct 05/15/2019 231*    Comment 05/15/2019 Comment     LDL Cholesterol 05/15/2019 250*    Hemoglobin A1C 05/15/2019 6 0*    Estimated Average Glucose 05/15/2019 126     TSH 05/15/2019 3 980    Orders Only on 05/15/2019   Component Date Value    Hemoglobin A1C 05/15/2019 6 0          Imaging  @[id number]@     VAS lower limb arterial duplex, complete bilateral    (Results Pending)   US abdominal aorta    (Results Pending)   US kidney and bladder    (Results Pending)   VAS renal artery complete    (Results Pending)   CT lung screening program    (Results Pending)     No results found for this or any previous visit  Physical Exam   Constitutional: She is oriented to person, place, and time  She appears well-developed  HENT:   Right Ear: External ear normal    Left Ear: External ear normal    Eyes: Right eye exhibits no discharge  Left eye exhibits no discharge  No scleral icterus  Neck: Carotid bruit is not present  No tracheal deviation present  No thyroid mass and no thyromegaly present  Cardiovascular: Normal rate, regular rhythm, normal heart sounds and intact distal pulses  Exam reveals no gallop and no friction rub  Pulses are weak pulses  No murmur heard  Pulses:       Dorsalis pedis pulses are 1+ on the right side, and 1+ on the left side  Posterior tibial pulses are 1+ on the right side, and 1+ on the left side  Pulmonary/Chest: No respiratory distress  She has no wheezes  She has no rales  Musculoskeletal: She exhibits no edema  Feet:   Right Foot:   Skin Integrity: Negative for ulcer, skin breakdown, erythema, warmth, callus or dry skin     Left Foot:   Skin Integrity: Negative for ulcer, skin breakdown, erythema, warmth, callus or dry skin  Lymphadenopathy:     She has no cervical adenopathy  Neurological: She is alert and oriented to person, place, and time  Coordination normal    Psychiatric: She has a normal mood and affect  Her behavior is normal  Judgment and thought content normal    Nursing note and vitals reviewed  BMI Counseling: Body mass index is 34 23 kg/m²  The BMI is above normal  Nutrition recommendations include reducing portion sizes, decreasing overall calorie intake, 3-5 servings of fruits/vegetables daily, reducing fast food intake, consuming healthier snacks, decreasing soda and/or juice intake, moderation in carbohydrate intake, increasing intake of lean protein and reducing intake of saturated fat and trans fat  Exercise recommendations include exercising 3-5 times per week

## 2019-10-22 NOTE — PATIENT INSTRUCTIONS
Obesity   AMBULATORY CARE:   Obesity  is when your body mass index (BMI) is greater than 30  Your healthcare provider will use your height and weight to measure your BMI  The risks of obesity include  many health problems, such as injuries or physical disability  You may need tests to check for the following:  · Diabetes     · High blood pressure or high cholesterol     · Heart disease     · Gallbladder or liver disease     · Cancer of the colon, breast, prostate, liver, or kidney     · Sleep apnea     · Arthritis or gout  Seek care immediately if:   · You have a severe headache, confusion, or difficulty speaking  · You have weakness on one side of your body  · You have chest pain, sweating, or shortness of breath  Contact your healthcare provider if:   · You have symptoms of gallbladder or liver disease, such as pain in your upper abdomen  · You have knee or hip pain and discomfort while walking  · You have symptoms of diabetes, such as intense hunger and thirst, and frequent urination  · You have symptoms of sleep apnea, such as snoring or daytime sleepiness  · You have questions or concerns about your condition or care  Treatment for obesity  focuses on helping you lose weight to improve your health  Even a small decrease in BMI can reduce the risk for many health problems  Your healthcare provider will help you set a weight-loss goal   · Lifestyle changes  are the first step in treating obesity  These include making healthy food choices and getting regular physical activity  Your healthcare provider may suggest a weight-loss program that involves coaching, education, and therapy  · Medicine  may help you lose weight when it is used with a healthy diet and physical activity  · Surgery  can help you lose weight if you are very obese and have other health problems  There are several types of weight-loss surgery  Ask your healthcare provider for more information    Be successful losing weight:   · Set small, realistic goals  An example of a small goal is to walk for 20 minutes 5 days a week  Anther goal is to lose 5% of your body weight  · Tell friends, family members, and coworkers about your goals  and ask for their support  Ask a friend to lose weight with you, or join a weight-loss support group  · Identify foods or triggers that may cause you to overeat , and find ways to avoid them  Remove tempting high-calorie foods from your home and workplace  Place a bowl of fresh fruit on your kitchen counter  If stress causes you to eat, then find other ways to cope with stress  · Keep a diary to track what you eat and drink  Also write down how many minutes of physical activity you do each day  Weigh yourself once a week and record it in your diary  Eating changes: You will need to eat 500 to 1,000 fewer calories each day than you currently eat to lose 1 to 2 pounds a week  The following changes will help you cut calories:  · Eat smaller portions  Use small plates, no larger than 9 inches in diameter  Fill your plate half full of fruits and vegetables  Measure your food using measuring cups until you know what a serving size looks like  · Eat 3 meals and 1 or 2 snacks each day  Plan your meals in advance  Haley [place] and eat at home most of the time  Eat slowly  · Eat fruits and vegetables at every meal   They are low in calories and high in fiber, which makes you feel full  Do not add butter, margarine, or cream sauce to vegetables  Use herbs to season steamed vegetables  · Eat less fat and fewer fried foods  Eat more baked or grilled chicken and fish  These protein sources are lower in calories and fat than red meat  Limit fast food  Dress your salads with olive oil and vinegar instead of bottled dressing  · Limit the amount of sugar you eat  Do not drink sugary beverages  Limit alcohol  Activity changes:  Physical activity is good for your body in many ways   It helps you burn calories and build strong muscles  It decreases stress and depression, and improves your mood  It can also help you sleep better  Talk to your healthcare provider before you begin an exercise program   · Exercise for at least 30 minutes 5 days a week  Start slowly  Set aside time each day for physical activity that you enjoy and that is convenient for you  It is best to do both weight training and an activity that increases your heart rate, such as walking, bicycling, or swimming  · Find ways to be more active  Do yard work and housecleaning  Walk up the stairs instead of using elevators  Spend your leisure time going to events that require walking, such as outdoor festivals or fairs  This extra physical activity can help you lose weight and keep it off  Follow up with your healthcare provider as directed: You may need to meet with a dietitian  Write down your questions so you remember to ask them during your visits  © 2017 [facility] Information is for End User's use only and may not be sold, redistributed or otherwise used for commercial purposes  All illustrations and images included in CareNotes® are the copyrighted property of [employer]  or [employer]  The above information is an  only  It is not intended as medical advice for individual conditions or treatments  Talk to your doctor, nurse or pharmacist before following any medical regimen to see if it is safe and effective for you  Tina Dai Blood on tissue paper after urinating which is gross hematuria will refer to Urology for further evaluation will get ultrasound of the kidneys will also check urinalysis with reflex, evaluation is necessary even though she is on Plavix  2   Hypertension is not control has close follow-up with Dr Topher [facility] will be seeing him next week will add Catapres patch once a week to her medications blood pressure goal less than 130/80 has been difficult to control for many years  3  Patient with tobacco abuse strongly recommend cessation  4  Patient with history of renal artery stenosis decreased pulses in her feet probable peripheral vascular disease will be getting vascular studies of her lower extremities renal arteries and aortic branch with 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

## 2019-10-22 NOTE — LETTER
October 22, 2019     Trina Rivera MD  [place] [address]    Patient: Angelo Leon   YOB: 1945   Date of Visit: 10/22/2019       Dear Dr Tom Carvalho:    Thank you for referring Mundo Li to me for evaluation  Below are my notes for this consultation  If you have questions, please do not hesitate to call me  I look forward to following your patient along with you  Sincerely,        Pablo Hardin DO        CC: No Recipients  Pablo Hardin DO  10/22/2019 12:04 PM  Sign at close encounter  Assessment/Plan:  Blood on tissue paper after urinating  1  Blood on tissue paper after urinating which is gross hematuria will refer to Urology for further evaluation will get ultrasound of the kidneys will also check urinalysis with reflex, evaluation is necessary even though she is on Plavix  2  Hypertension is not control has close follow-up with Dr [facility] will be seeing him next week will add Catapres patch once a week to her medications blood pressure goal less than 130/80 has been difficult to control for many years  3  Patient with tobacco abuse strongly recommend cessation  4  Patient with history of renal artery stenosis decreased pulses in her feet probable peripheral vascular disease will be getting vascular studies of her lower extremities renal arteries and aortic branch with known aortoiliac stenosis on the left  5  Type 2 diabetes will check A1c in 4 months last A1c was 6  6  Trochanteric bursitis not severe at this time has classic symptoms of she has more symptoms may return for injection to use ice at the end of the day  7  Had CT for lung cancer screening program ordered has not been done will reorder again encouraged have done             Diagnoses and all orders for this visit:    Gross hematuria on tissue paper after wiping  -     UA w Reflex to Microscopic w Reflex to Culture  -     Cancel: US kidney and bladder;  Future  - Ambulatory referral to Urology; Future  -     CBC and differential; Future  -     Comprehensive metabolic panel; Future  -     Hemoglobin A1C; Future  -     TSH, 3rd generation with Free T4 reflex; Future  -     Vitamin D 25 hydroxy; Future  -     Magnesium; Future  -     Uric acid; Future  -     Microalbumin / creatinine urine ratio; Future  -     UA w Reflex to Microscopic w Reflex to Culture; Future  -     US kidney and bladder; Future    Tobacco use disorder  -     CBC and differential; Future  -     Comprehensive metabolic panel; Future  -     Hemoglobin A1C; Future  -     TSH, 3rd generation with Free T4 reflex; Future  -     Vitamin D 25 hydroxy; Future  -     Magnesium; Future  -     Uric acid; Future  -     Microalbumin / creatinine urine ratio; Future  -     UA w Reflex to Microscopic w Reflex to Culture; Future    Vitamin D deficiency  -     CBC and differential; Future  -     Comprehensive metabolic panel; Future  -     Hemoglobin A1C; Future  -     TSH, 3rd generation with Free T4 reflex; Future  -     Vitamin D 25 hydroxy; Future  -     Magnesium; Future  -     Uric acid; Future  -     Microalbumin / creatinine urine ratio; Future  -     UA w Reflex to Microscopic w Reflex to Culture; Future    CKD (chronic kidney disease), stage III (HCC)  -     CBC and differential; Future  -     Comprehensive metabolic panel; Future  -     Hemoglobin A1C; Future  -     TSH, 3rd generation with Free T4 reflex; Future  -     Vitamin D 25 hydroxy; Future  -     Magnesium; Future  -     Uric acid; Future  -     Microalbumin / creatinine urine ratio; Future  -     UA w Reflex to Microscopic w Reflex to Culture; Future    Type 2 diabetes mellitus with diabetic nephropathy, without long-term current use of insulin (HCC)  -     CBC and differential; Future  -     Comprehensive metabolic panel; Future  -     Hemoglobin A1C; Future  -     TSH, 3rd generation with Free T4 reflex; Future  -     Vitamin D 25 hydroxy;  Future  - Magnesium; Future  -     Uric acid; Future  -     Microalbumin / creatinine urine ratio; Future  -     UA w Reflex to Microscopic w Reflex to Culture; Future    Hx-TIA (transient ischemic attack)  -     CBC and differential; Future  -     Comprehensive metabolic panel; Future  -     Hemoglobin A1C; Future  -     TSH, 3rd generation with Free T4 reflex; Future  -     Vitamin D 25 hydroxy; Future  -     Magnesium; Future  -     Uric acid; Future  -     Microalbumin / creatinine urine ratio; Future  -     UA w Reflex to Microscopic w Reflex to Culture; Future    Hypertension, unspecified type  -     CBC and differential; Future  -     Comprehensive metabolic panel; Future  -     Hemoglobin A1C; Future  -     TSH, 3rd generation with Free T4 reflex; Future  -     Vitamin D 25 hydroxy; Future  -     Magnesium; Future  -     Uric acid; Future  -     Microalbumin / creatinine urine ratio; Future  -     UA w Reflex to Microscopic w Reflex to Culture; Future  -     cloNIDine (CATAPRES-TTS-2) 0 2 mg/24 hr; Place 1 patch (0 2 mg total) on the skin once a week    Hypertension associated with diabetes ([facility] [address] )  -     CBC and differential; Future  -     Comprehensive metabolic panel; Future  -     Hemoglobin A1C; Future  -     TSH, 3rd generation with Free T4 reflex; Future  -     Vitamin D 25 hydroxy; Future  -     Magnesium; Future  -     Uric acid; Future  -     Microalbumin / creatinine urine ratio; Future  -     UA w Reflex to Microscopic w Reflex to Culture; Future    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  -     CBC and differential; Future  -     Comprehensive metabolic panel; Future  -     Hemoglobin A1C; Future  -     TSH, 3rd generation with Free T4 reflex; Future  -     Vitamin D 25 hydroxy; Future  -     Magnesium; Future  -     Uric acid; Future  -     Microalbumin / creatinine urine ratio; Future  -     UA w Reflex to Microscopic w Reflex to Culture;  Future    Depression with anxiety  -     CBC and differential; Future  -     Comprehensive metabolic panel; Future  -     Hemoglobin A1C; Future  -     TSH, 3rd generation with Free T4 reflex; Future  -     Vitamin D 25 hydroxy; Future  -     Magnesium; Future  -     Uric acid; Future  -     Microalbumin / creatinine urine ratio; Future  -     UA w Reflex to Microscopic w Reflex to Culture; Future    Spinal stenosis of lumbar region without neurogenic claudication  -     CBC and differential; Future  -     Comprehensive metabolic panel; Future  -     Hemoglobin A1C; Future  -     TSH, 3rd generation with Free T4 reflex; Future  -     Vitamin D 25 hydroxy; Future  -     Magnesium; Future  -     Uric acid; Future  -     Microalbumin / creatinine urine ratio; Future  -     UA w Reflex to Microscopic w Reflex to Culture; Future    Basilar artery stenosis  -     CBC and differential; Future  -     Comprehensive metabolic panel; Future  -     Hemoglobin A1C; Future  -     TSH, 3rd generation with Free T4 reflex; Future  -     Vitamin D 25 hydroxy; Future  -     Magnesium; Future  -     Uric acid; Future  -     Microalbumin / creatinine urine ratio; Future  -     UA w Reflex to Microscopic w Reflex to Culture; Future    BMI 34 0-34 9,adult  -     CBC and differential; Future  -     Comprehensive metabolic panel; Future  -     Hemoglobin A1C; Future  -     TSH, 3rd generation with Free T4 reflex; Future  -     Vitamin D 25 hydroxy; Future  -     Magnesium; Future  -     Uric acid; Future  -     Microalbumin / creatinine urine ratio; Future  -     UA w Reflex to Microscopic w Reflex to Culture; Future    Hypokalemia  -     CBC and differential; Future  -     Comprehensive metabolic panel; Future  -     Hemoglobin A1C; Future  -     TSH, 3rd generation with Free T4 reflex; Future  -     Vitamin D 25 hydroxy; Future  -     Magnesium; Future  -     Uric acid; Future  -     Microalbumin / creatinine urine ratio;  Future  -     UA w Reflex to Microscopic w Reflex to Culture; Future    Decreased pulses in feet  -     VAS lower limb arterial duplex, complete bilateral; Future    Screen for colon cancer  -     Cologuard; Future    Refused influenza vaccine    Aortoiliac stenosis, left (HCC)  -     VAS lower limb arterial duplex, complete bilateral; Future  -     US abdominal aorta; Future    Claudication in peripheral vascular disease (HCC)  -     VAS lower limb arterial duplex, complete bilateral; Future    Polyneuropathy    Renal artery stenosis ([facility] [address] )  -     US abdominal aorta; Future  -     VAS renal artery complete; Future    Atherosclerosis of renal artery ([facility] [address] )    Encounter for screening for lung cancer  -     CT lung screening program; Future      Patient's shoes and socks removed  Right Foot/Ankle   Right Foot Inspection  Skin Exam: skin normal and skin intact no dry skin, no warmth, no callus, no erythema, no maceration, no abnormal color, no pre-ulcer, no ulcer and no callus                          Toe Exam: no swelling, no tenderness, erythema and  no right toe deformity  Sensory   Vibration: intact  Proprioception: intact   Monofilament testing: intact  Vascular    The right DP pulse is 1+  The right PT pulse is 1+  Right Toe  - Comprehensive Exam  Ecchymosis: none  Swelling: none   Tenderness: none         Left Foot/Ankle  Left Foot Inspection  Skin Exam: skin normal and skin intactno dry skin, no warmth, no erythema, no maceration, normal color, no pre-ulcer, no ulcer and no callus                         Toe Exam: no swelling, no tenderness, no erythema and no left toe deformity                   Sensory   Vibration: intact  Proprioception: intact  Monofilament: intact  Vascular    The left DP pulse is 1+  The left PT pulse is 1+  Left Toe  - Comprehensive Exam  Ecchymosis: none  Swelling: none   Tenderness: none       Assign Risk Category:  No deformity present;  Loss of protective sensation; Weak pulses       Risk: 0    The patient was counseled regarding instructions for management, risk factor reductions, patient and family education,impressions, risks and benefits of treatment options, side effects of medications, importance of compliance with treatment  The treatment plan was reviewed with the patient/guardian and patient/guardian understands and agrees with the treatment plan  Current Outpatient Medications:     amLODIPine (NORVASC) 5 mg tablet, Take 1 tablet (5 mg total) by mouth 2 (two) times a day, Disp: 60 tablet, Rfl: 11    Cholecalciferol (VITAMIN D3) 1000 units CAPS, Take 1 capsule by mouth every other day , Disp: , Rfl:     clopidogrel (PLAVIX) 75 mg tablet, Take 1 tablet (75 mg total) by mouth daily, Disp: 90 tablet, Rfl: 1    ferrous sulfate 325 (65 Fe) mg tablet, Take 325 mg by mouth daily, Disp: , Rfl:     folic acid (FOLVITE) 851 MCG tablet, Take 800 mcg by mouth daily , Disp: , Rfl:     labetalol (NORMODYNE) 100 mg tablet, TAKE 1 TABLET BY MOUTH TWICE A DAY, Disp: 180 tablet, Rfl: 0    losartan (COZAAR) 100 MG tablet, Take 100 mg by mouth every morning , Disp: , Rfl:     losartan (COZAAR) 50 mg tablet, Take 1 tablet by mouth daily  , Disp: , Rfl:     Omega-3 Fatty Acids (SM FISH OIL) 1200 MG CAPS, Take by mouth 2 (two) times a day, Disp: , Rfl:     traMADol (ULTRAM) 50 mg tablet, Take 1 tablet (50 mg total) by mouth every 8 (eight) hours as needed for moderate pain, Disp: 60 tablet, Rfl: 1    cloNIDine (CATAPRES-TTS-2) 0 2 mg/24 hr, Place 1 patch (0 2 mg total) on the skin once a week, Disp: 12 patch, Rfl: 3    Subjective:      Patient ID: Tammie Landers is a 69 y o  female      Blood on tissue paper 1 month ago intermittent tolerating medications well has numbness on her feet      The following portions of the patient's history were reviewed and updated as appropriate:   She has a past medical history of Arthritis, Coronary artery disease, Diabetes mellitus ([address] ), Endometriosis, High cholesterol, Hypertension, Kidney disease, chronic, stage III (moderate, EGFR 30-59 ml/min) ([facility]), Lumbar disc herniation, Peripheral vascular disease ([facility] [address] ), Shoulder injury, Spinal stenosis, and Stroke ([facility] [address] )  ,  does not have any pertinent problems on file  ,   has a past surgical history that includes Rotator cuff repair (Left) and Tonsillectomy  ,  family history includes Arthritis in her brother; Heart defect in her father; Heart disease in her mother; Hyperlipidemia in her mother; Hypertension in her father and mother; Other in her brother; Stroke in her sister  ,   reports that she has been smoking cigarettes  She has been smoking about 1 00 pack per day  She has never used smokeless tobacco  She reports that she does not drink alcohol or use drugs  ,  is allergic to atorvastatin; colesevelam; colestipol; ezetimibe; fenofibrate; pollen extract; rosuvastatin; and statins       Review of Systems   Constitutional: Negative for appetite change, chills, fatigue, fever and unexpected weight change  HENT: Negative for congestion, ear pain, facial swelling, hearing loss, mouth sores, nosebleeds, postnasal drip, rhinorrhea, sinus pain, sore throat, trouble swallowing and voice change  Eyes: Negative for pain, discharge, redness and visual disturbance  Respiratory: Negative for apnea, chest tightness, shortness of breath, wheezing and stridor  Cardiovascular: Negative for chest pain, palpitations and leg swelling  Gastrointestinal: Negative for abdominal distention, abdominal pain, blood in stool, constipation, diarrhea and vomiting  Endocrine: Negative for cold intolerance, heat intolerance, polydipsia, polyphagia and polyuria  Genitourinary: Positive for hematuria  Negative for difficulty urinating, dysuria, flank pain, frequency, genital sores and urgency  Musculoskeletal: Negative for arthralgias and back pain  Skin: Negative for rash and wound     Allergic/Immunologic: Negative for environmental allergies, food allergies and immunocompromised state  Neurological: Negative for dizziness, tremors, seizures, syncope, facial asymmetry, speech difficulty, weakness, light-headedness, numbness and headaches  Hematological: Negative for adenopathy  Does not bruise/bleed easily  Psychiatric/Behavioral: Negative for agitation, behavioral problems, dysphoric mood, hallucinations, self-injury, sleep disturbance and suicidal ideas  The patient is not hyperactive            Objective:  BP (!) 200/80 (BP Location: Left arm, Patient Position: Sitting)   Pulse 85   Temp 97 5 °F (36 4 °C) (Tympanic)   Resp 16   Ht 4' 10" (1 473 m)   Wt 74 3 kg (163 lb 12 8 oz)   LMP  (LMP Unknown)   SpO2 98%   BMI 34 23 kg/m²      Lab Review  Orders Only on 05/15/2019   Component Date Value    White Blood Cell Count 05/15/2019 10 2     Red Blood Cell Count 05/15/2019 4 53     Hemoglobin 05/15/2019 13 6     HCT 05/15/2019 40 9     MCV 05/15/2019 90     MCH 05/15/2019 30 0     MCHC 05/15/2019 33 3     RDW 05/15/2019 14 5     Platelet Count 95/13/3761 225     Neutrophils 05/15/2019 69     Lymphocytes 05/15/2019 17     Monocytes 05/15/2019 11     Eosinophils 05/15/2019 2     Basophils PCT 05/15/2019 0     Neutrophils (Absolute) 05/15/2019 7 1*    Lymphocytes (Absolute) 05/15/2019 1 8     Monocytes (Absolute) 05/15/2019 1 1*    Eosinophils (Absolute) 05/15/2019 0 2     Basophils ABS 05/15/2019 0 0     Immature Granulocytes 05/15/2019 1     Immature Granulocytes (A* 05/15/2019 0 1     Glucose, Random 05/15/2019 99     BUN 05/15/2019 24     Creatinine 05/15/2019 1 42*    eGFR Non  05/15/2019 37*    eGFR  05/15/2019 43*    SL AMB BUN/CREATININE RA* 05/15/2019 17     Sodium 05/15/2019 142     Potassium 05/15/2019 4 1     Chloride 05/15/2019 103     CO2 05/15/2019 22     CALCIUM 05/15/2019 9 4     Protein, Total 05/15/2019 6 8     Albumin 05/15/2019 4 3     Globulin, Total 05/15/2019 2 5     Albumin/Globulin Ratio 05/15/2019 1 7     TOTAL BILIRUBIN 05/15/2019 0 3     Alk Phos Isoenzymes 05/15/2019 96     AST 05/15/2019 14     ALT 05/15/2019 13     Cholesterol, Total 05/15/2019 316*    Triglycerides 05/15/2019 164*    HDL 05/15/2019 52     VLDL Cholesterol Calcula* 05/15/2019 33     LDL Direct 05/15/2019 231*    Comment 05/15/2019 Comment     LDL Cholesterol 05/15/2019 250*    Hemoglobin A1C 05/15/2019 6 0*    Estimated Average Glucose 05/15/2019 126     TSH 05/15/2019 3 980    Orders Only on 05/15/2019   Component Date Value    Hemoglobin A1C 05/15/2019 6 0          Imaging  @[id number]@     VAS lower limb arterial duplex, complete bilateral    (Results Pending)   US abdominal aorta    (Results Pending)   US kidney and bladder    (Results Pending)   VAS renal artery complete    (Results Pending)   CT lung screening program    (Results Pending)     No results found for this or any previous visit  Physical Exam   Constitutional: She is oriented to person, place, and time  She appears well-developed  HENT:   Right Ear: External ear normal    Left Ear: External ear normal    Eyes: Right eye exhibits no discharge  Left eye exhibits no discharge  No scleral icterus  Neck: Carotid bruit is not present  No tracheal deviation present  No thyroid mass and no thyromegaly present  Cardiovascular: Normal rate, regular rhythm, normal heart sounds and intact distal pulses  Exam reveals no gallop and no friction rub  Pulses are weak pulses  No murmur heard  Pulses:       Dorsalis pedis pulses are 1+ on the right side, and 1+ on the left side  Posterior tibial pulses are 1+ on the right side, and 1+ on the left side  Pulmonary/Chest: No respiratory distress  She has no wheezes  She has no rales  Musculoskeletal: She exhibits no edema  Feet:   Right Foot:   Skin Integrity: Negative for ulcer, skin breakdown, erythema, warmth, callus or dry skin     Left Foot:   Skin Integrity: Negative for ulcer, skin breakdown, erythema, warmth, callus or dry skin  Lymphadenopathy:     She has no cervical adenopathy  Neurological: She is alert and oriented to person, place, and time  Coordination normal    Psychiatric: She has a normal mood and affect  Her behavior is normal  Judgment and thought content normal    Nursing note and vitals reviewed  BMI Counseling: Body mass index is 34 23 kg/m²  The BMI is above normal  Nutrition recommendations include reducing portion sizes, decreasing overall calorie intake, 3-5 servings of fruits/vegetables daily, reducing fast food intake, consuming healthier snacks, decreasing soda and/or juice intake, moderation in carbohydrate intake, increasing intake of lean protein and reducing intake of saturated fat and trans fat  Exercise recommendations include exercising 3-5 times per week

## 2019-10-26 LAB
APPEARANCE UR: CLEAR
BACTERIA URNS QL MICRO: NORMAL
BILIRUB UR QL STRIP: NEGATIVE
COLOR UR: YELLOW
EPI CELLS #/AREA URNS HPF: NORMAL /HPF (ref 0–10)
GLUCOSE UR QL: NEGATIVE
HGB UR QL STRIP: ABNORMAL
KETONES UR QL STRIP: NEGATIVE
LEUKOCYTE ESTERASE UR QL STRIP: NEGATIVE
MICRO URNS: ABNORMAL
NITRITE UR QL STRIP: NEGATIVE
PH UR STRIP: 6 [PH] (ref 5–7.5)
PROT UR QL STRIP: ABNORMAL
RBC #/AREA URNS HPF: NORMAL /HPF (ref 0–2)
SL AMB URINALYSIS REFLEX: ABNORMAL
SP GR UR: 1.01 (ref 1–1.03)
UROBILINOGEN UR STRIP-ACNC: 0.2 MG/DL (ref 0.2–1)
WBC #/AREA URNS HPF: NORMAL /HPF (ref 0–5)

## 2019-10-29 ENCOUNTER — HOSPITAL ENCOUNTER (OUTPATIENT)
Dept: ULTRASOUND IMAGING | Facility: CLINIC | Age: 74
Discharge: HOME | End: 2019-10-29
Payer: COMMERCIAL

## 2019-10-29 ENCOUNTER — HOSPITAL ENCOUNTER (OUTPATIENT)
Dept: CT IMAGING | Facility: CLINIC | Age: 74
Discharge: HOME | End: 2019-10-29
Payer: COMMERCIAL

## 2019-10-29 DIAGNOSIS — Z12.2 ENCOUNTER FOR SCREENING FOR LUNG CANCER: ICD-10-CM

## 2019-10-29 DIAGNOSIS — R31.0 GROSS HEMATURIA: ICD-10-CM

## 2019-10-29 PROCEDURE — 76770 US EXAM ABDO BACK WALL COMP: CPT

## 2019-10-29 PROCEDURE — G0297 LDCT FOR LUNG CA SCREEN: HCPCS

## 2019-10-30 ENCOUNTER — TELEPHONE (OUTPATIENT)
Dept: UROLOGY | Facility: CLINIC | Age: 74
End: 2019-10-30

## 2019-10-30 NOTE — PROGRESS NOTES
Problem List Items Addressed This Visit        Genitourinary    Gross hematuria on tissue paper after wiping - Primary    Relevant Orders    POCT urine dip (Completed)    Urinalysis with microscopic    Cytology, urine    Case request operating room: TRANSURETHRAL RESECTION OF BLADDER TUMOR (TURBT), mitomycin C instillation, CYSTOSCOPY WITH RETROGRADE PYELOGRAM (Completed)    Bladder tumor    Relevant Orders    Case request operating room: TRANSURETHRAL RESECTION OF BLADDER TUMOR (TURBT), mitomycin C instillation, CYSTOSCOPY WITH RETROGRADE PYELOGRAM (Completed)            Discussion:      I had a nice visit with Latrice Elmore today, in this discussion we discussed the differential diagnosis of gross hematuria, as well as the contributing factor of her heavy smoking history  We talked about the fact that these carcinogens or filter by the kidneys and sit in the bladder causing changes in the DNA of the urothelial cells which can lead to dysplasia and tumors  I reviewed with her in real time her imaging studies which did show a bladder lesion, this is confirmed upon cystoscopy today to be a bladder tumor appearing low-grade in nature  This is at the right lateral wall/base of the bladder, she also has an area at the left lateral superior/dome portion of the bladder that appears to be carcinoma in situ versus a developing tumor  Spoke with her as well about the need for bilateral retrograde pyelography at the time of surgery, with potential ureteral stent placement and all indicated procedures, we also talked about the need for her to stop any blood thinners 1 week prior to her surgery and to check with her medical physician for fitness for surgery  She states that she has previously had a stroke without residual deficits, she also has had multiple issues with other health problems related to smoking  She denies particular chest pain, her functional status is otherwise good per her report      I then spoke with her about bladder cancer, the importance of depth of invasion and staging and differences between low-grade and high-grade bladder tumors and the potential need for repeat TURBT should her pathology show high-grade bladder cancer  We also talked about the need for stenting as above given that her tumor maybe over the area of the intramural ureter  We spoke with her about the risks of infection, bleeding, pain, damage to surrounding structures, need for additional procedures, risk of failure of the procedure, risk of anesthesia, risk of positioning complications including neurapraxia, chronic pain, and paralysis as well as risk of rhabdomyolysis and compartment syndrome  Risk of deep venous thrombosis and venous thromboembolism as well as pneumonia and other potential unpredictable complications were also discussed with the patient  I did tell her about the need for regular surveillance cystoscopy, and the fact that she will have higher than usual risks given her heavy smoking history and her comorbidities  She will undergo medical clearance and optimization as above, she provided informed consent for TURBT and all other indicated procedures as above      Assessment and plan:       Please see problem oriented charting for the assessment plan of today's urological complaints    Liv Pacheco MD      Chief Complaint     Gross hematuria  Heavy smoking history      History of Present Illness     Edison Weiner is a 69 y o  woman with a history of gross hematuria, with a potential bladder mass seen on renal bladder ultrasound, she is referred urgently by Dr Henry Bae for further urologic evaluation  A copy of today's consultation has been sent to the referring provider in the name of continuity of care  With regard to this complaint it is localized to the bladder, she has noted some blood on her toilet paper when wiping, this has been often on    The quality is described as relatively asymptomatic and the severity of this complaint is described as mild to moderate  These symptoms have been present for off and on 4 weeks to months and the timing is ongoing  Previous treatments include none and previous work-up includes evaluation by her primary care provider and appropriate referral to Urology on an urgent basis  The patient mentions potential blood thinners and nothing else in particular as aggravating and alleviating factors, respectively  The following associated signs and symptoms are mentioned:  None, apart from some anxiety regarding potential bladder cancer  The ultrasound was performed which shows a bladder lesion, and no hydronephrosis  In terms of other complaints today she has none  The following portions of the patient's history were reviewed and updated as appropriate: allergies, current medications, past family history, past medical history, past social history, past surgical history and problem list         Detailed Urologic History     - please refer to HPI    Review of Systems     Review of Systems   Constitutional: Negative  HENT: Negative  Eyes: Negative  Respiratory: Negative  Cardiovascular: Negative  Gastrointestinal: Negative  Endocrine: Negative  Genitourinary:        As per HPI   Musculoskeletal: Negative  Skin: Negative  Allergic/Immunologic: Negative  Neurological: Negative  Hematological: Negative  Psychiatric/Behavioral: Negative                Allergies     Allergies   Allergen Reactions    Atorvastatin Dizziness and Myalgia     Other reaction(s): Dizziness/Myalgia    Colesevelam      Other reaction(s): leg pains    Colestipol      Other reaction(s): Swelling, Itching    Ezetimibe      Other reaction(s): heartburn  Other reaction(s): Heart Burn    Fenofibrate      Other reaction(s): blood in urine  hx  Kidney Failure    Pollen Extract     Rosuvastatin      Other reaction(s): Leg Cramping    Statins Myalgia       Physical Exam Physical Exam   Constitutional: She is oriented to person, place, and time  She appears well-developed and well-nourished  No distress  HENT:   Head: Normocephalic and atraumatic  Nose: Nose normal    Mouth/Throat: Oropharynx is clear and moist  No oropharyngeal exudate  Eyes: Right eye exhibits no discharge  Left eye exhibits no discharge  Neck: No tracheal deviation present  Cardiovascular: Normal rate, regular rhythm and intact distal pulses  Pulmonary/Chest: Effort normal  No stridor  No respiratory distress  Abdominal: Soft  She exhibits no distension and no mass  There is no tenderness  There is no rebound and no guarding  No hernia  Genitourinary:   Genitourinary Comments: Vaginal mucosal atrophy is present, no vaginal lesions   Musculoskeletal: She exhibits no edema, tenderness or deformity  Neurological: She is alert and oriented to person, place, and time  No cranial nerve deficit  Coordination normal    Skin: Skin is warm and dry  No rash noted  She is not diaphoretic  No erythema  No pallor  Psychiatric: She has a normal mood and affect  Her behavior is normal  Judgment and thought content normal    Nursing note and vitals reviewed            Vital Signs  Vitals:    10/31/19 0804   BP: 136/70   BP Location: Left arm   Patient Position: Sitting   Cuff Size: Adult   Weight: 73 9 kg (163 lb)   Height: 4' 10" (1 473 m)         Current Medications       Current Outpatient Medications:     amLODIPine (NORVASC) 5 mg tablet, Take 1 tablet (5 mg total) by mouth 2 (two) times a day, Disp: 60 tablet, Rfl: 11    Cholecalciferol (VITAMIN D3) 1000 units CAPS, Take 1 capsule by mouth every other day , Disp: , Rfl:     cloNIDine (CATAPRES-TTS-2) 0 2 mg/24 hr, Place 1 patch (0 2 mg total) on the skin once a week, Disp: 12 patch, Rfl: 3    clopidogrel (PLAVIX) 75 mg tablet, Take 1 tablet (75 mg total) by mouth daily, Disp: 90 tablet, Rfl: 1    ferrous sulfate 325 (65 Fe) mg tablet, Take 325 mg by mouth daily, Disp: , Rfl:     folic acid (FOLVITE) 512 MCG tablet, Take 800 mcg by mouth daily , Disp: , Rfl:     labetalol (NORMODYNE) 100 mg tablet, TAKE 1 TABLET BY MOUTH TWICE A DAY, Disp: 180 tablet, Rfl: 0    losartan (COZAAR) 100 MG tablet, Take 100 mg by mouth every morning , Disp: , Rfl:     losartan (COZAAR) 50 mg tablet, Take 1 tablet by mouth daily  , Disp: , Rfl:     Omega-3 Fatty Acids (SM FISH OIL) 1200 MG CAPS, Take by mouth 2 (two) times a day, Disp: , Rfl:     traMADol (ULTRAM) 50 mg tablet, Take 1 tablet (50 mg total) by mouth every 8 (eight) hours as needed for moderate pain, Disp: 60 tablet, Rfl: 1      Active Problems     Patient Active Problem List   Diagnosis    Spinal stenosis    Basilar artery stenosis    Breast mass    C[facility]brovascular accident (CVA) ([facility]    Chronic GERD    CKD (chronic kidney disease), stage III (HCC)    Claudication in peripheral vascular disease ([facility]    Depression with anxiety    Type 2 diabetes mellitus with diabetic nephropathy (HCC)    Endometriosis    Gout    Hx-TIA (transient ischemic attack)    Hypercholesteremia    Hyperlipidemia associated with type 2 diabetes mellitus ([facility]    Hypertension    Hypertension associated with diabetes ([facility]    Osteoarthritis    Obesity (BMI 30-39  9)    Polyneuropathy    Renal artery stenosis (HCC)    Tobacco use disorder    Vertebrobasilar artery syndrome    Vitamin D deficiency    Statin intolerance    Lumbar disc herniation    Pain of left lower extremity    Abnormal EKG    Spondylosis of lumbar region without myelopathy or radiculopathy    Aortoiliac stenosis, left (HCC)    Hypokalemia    Acute drug-induced gout of left foot    Gross hematuria on tissue paper after wiping    Decreased pulses in feet    Refused influenza vaccine    Hypercalcemia    Lumbosacral spondylosis without myelopathy    Neurodermatitis    Other proteinuria    Obesity with body mass index 30 or greater    Hyperlipidemia, unspecified    Herniated lumbar intervertebral disc    Atherosclerosis of renal artery ([facility] [facility]    Bladder tumor         Past Medical History     Past Medical History:   Diagnosis Date    Arthritis     Coronary artery disease     Diabetes mellitus ([facility]     Endometriosis     High cholesterol     Hypertension     Kidney disease, chronic, stage III (moderate, EGFR 30-59 ml/min) ([facility])     Lumbar disc herniation     Peripheral vascular disease ([facility]     Shoulder injury     left    Spinal stenosis     Stroke [facility])          Surgical History     Past Surgical History:   Procedure Laterality Date    ROTATOR CUFF REPAIR Left     TONSILLECTOMY           Family History     Family History   Problem Relation Age of Onset    Hypertension Mother     Heart disease Mother         Valvular    Hyperlipidemia Mother     Hypertension Father     Heart defect Father         Cardiomegaly    Stroke Sister         Cerebrovascular Accident    Arthritis Brother     Other Brother         Back Disorder         Social History     Social History     Social History     Tobacco Use   Smoking Status Current Every Day Smoker    Packs/day: 1 00    Types: Cigarettes   Smokeless Tobacco Never Used         Pertinent Lab Values     Lab Results   Component Value Date    CREATININE 1 42 (H) 05/15/2019                 Pertinent Imaging       The patient's images were reviewed by me personally and also in real time with them in the examination room using our PACS imaging system  The imaging findings are significant for bladder lesion at the lateral/base of the bladder on the right

## 2019-10-30 NOTE — TELEPHONE ENCOUNTER
Called and spoke to patient  Patient accepted appointment for tomorrow at 8:00am for cystoscopy  Patient is aware to come with a comfortably full bladder  Patient is aware of office location  Patient denies any questions or concerns at this time

## 2019-10-30 NOTE — PATIENT INSTRUCTIONS

## 2019-10-31 ENCOUNTER — PROCEDURE VISIT (OUTPATIENT)
Dept: UROLOGY | Facility: CLINIC | Age: 74
End: 2019-10-31
Payer: COMMERCIAL

## 2019-10-31 ENCOUNTER — HOSPITAL ENCOUNTER (OUTPATIENT)
Facility: HOSPITAL | Age: 74
Setting detail: OUTPATIENT SURGERY
End: 2019-10-31
Attending: UROLOGY | Admitting: UROLOGY
Payer: COMMERCIAL

## 2019-10-31 VITALS
BODY MASS INDEX: 34.22 KG/M2 | SYSTOLIC BLOOD PRESSURE: 136 MMHG | HEIGHT: 58 IN | DIASTOLIC BLOOD PRESSURE: 70 MMHG | WEIGHT: 163 LBS

## 2019-10-31 DIAGNOSIS — R31.0 GROSS HEMATURIA: ICD-10-CM

## 2019-10-31 DIAGNOSIS — R80.9 PROTEINURIA, UNSPECIFIED TYPE: Primary | ICD-10-CM

## 2019-10-31 DIAGNOSIS — Z71.2 PERSON CONSULTING FOR EXPLANATION OF EXAMINATION OR TEST FINDING: ICD-10-CM

## 2019-10-31 DIAGNOSIS — D49.4 BLADDER TUMOR: Primary | ICD-10-CM

## 2019-10-31 LAB
BACTERIA UR QL AUTO: NORMAL /HPF
BILIRUB UR QL STRIP: NEGATIVE
CLARITY UR: CLEAR
COLOR UR: YELLOW
GLUCOSE UR STRIP-MCNC: NEGATIVE MG/DL
HGB UR QL STRIP.AUTO: ABNORMAL
HYALINE CASTS #/AREA URNS LPF: NORMAL /LPF
KETONES UR STRIP-MCNC: NEGATIVE MG/DL
LEUKOCYTE ESTERASE UR QL STRIP: NEGATIVE
NITRITE UR QL STRIP: NEGATIVE
NON-SQ EPI CELLS URNS QL MICRO: NORMAL /HPF
PH UR STRIP.AUTO: 6 [PH]
PROT UR STRIP-MCNC: ABNORMAL MG/DL
RBC #/AREA URNS AUTO: NORMAL /HPF
SL AMB  POCT GLUCOSE, UA: NORMAL
SL AMB LEUKOCYTE ESTERASE,UA: NORMAL
SL AMB POCT BILIRUBIN,UA: NORMAL
SL AMB POCT BLOOD,UA: NORMAL
SL AMB POCT CLARITY,UA: CLEAR
SL AMB POCT COLOR,UA: YELLOW
SL AMB POCT KETONES,UA: NORMAL
SL AMB POCT NITRITE,UA: NORMAL
SL AMB POCT PH,UA: 6
SL AMB POCT SPECIFIC GRAVITY,UA: 1.02
SL AMB POCT URINE PROTEIN: NORMAL
SL AMB POCT UROBILINOGEN: NORMAL
SP GR UR STRIP.AUTO: 1.02 (ref 1–1.03)
UROBILINOGEN UR QL STRIP.AUTO: 0.2 E.U./DL
WBC #/AREA URNS AUTO: NORMAL /HPF

## 2019-10-31 PROCEDURE — 52000 CYSTOURETHROSCOPY: CPT | Performed by: UROLOGY

## 2019-10-31 PROCEDURE — 88112 CYTOPATH CELL ENHANCE TECH: CPT | Performed by: PATHOLOGY

## 2019-10-31 PROCEDURE — 99204 OFFICE O/P NEW MOD 45 MIN: CPT | Performed by: UROLOGY

## 2019-10-31 PROCEDURE — 81001 URINALYSIS AUTO W/SCOPE: CPT | Performed by: UROLOGY

## 2019-10-31 PROCEDURE — 81002 URINALYSIS NONAUTO W/O SCOPE: CPT | Performed by: UROLOGY

## 2019-10-31 NOTE — PROGRESS NOTES
Staff was asked to Please call patient urinalysis shows protein as well as blood in the urine, repeat urinalysis ordered

## 2019-10-31 NOTE — LETTER
October 31, 2019     Antonio Green DO  [id number] [id number]    Patient: Luzma Edmonds   YOB: 1945   Date of Visit: 10/31/2019       Dear Dr Srinivasan President: Thank you for referring Padmaja Price to me for evaluation  Below are my notes for this consultation  If you have questions, please do not hesitate to call me  I look forward to following your patient along with you  Sincerely,        Karmen Leung MD        CC: No Recipients  Karmen Leung MD  10/31/2019  8:28 AM  Sign at close encounter  Office Cystoscopy Procedure Note    Indication:    Hematuria    Informed consent   The risks, benefits, complications, treatment options, and expected outcomes were discussed with the patient  The patient concurred with the proposed plan and provided informed consent  Anesthesia  Lidocaine jelly 2%    Antibiotic prophylaxis   None    Procedure  In the presence of a female nurse, the patient was placed in the supine frog-legged position, was prepped and draped in the usual manner using sterile technique, and 2% lidocaine jelly instilled into the urethra  Prior to this pelvic examination showed atrophic labia majora and minora, a small caliber vaginal introitus, moderate to poor quality vaginal mucosa, and showed No pelvic floor descensus and No urethral hypermobility  Upon stress maneuvers there was no stress incontinence  A 17 F flexible cystoscope was then inserted into the urethra and the urethra and bladder carefully examined    The following findings were noted:    Findings:  Urethra:  Normal  Bladder:  Abnormal, tumor measuring approximately 2 centimeters at the right lateral/base of the bladder, appearing mostly low-grade in nature, an additional area measuring approximately 1 centimeter at the left lateral dome of the bladder shows some changes concerning for potential carcinoma in situ versus developing papillary tumor  Ureteral orifices:  Normal  Other findings:  None , retroflexed view confirms    Specimens: None                 Complications:    None; patient tolerated the procedure well           Disposition: To home     Condition: Stable    Plan: Proceed to the operating room for TURBT and bilateral retrogrades of mitomycin, and all indicated procedures  Cystoscopy  Date/Time: 10/31/2019 8:28 AM  Performed by: Elisa Fairbanks MD  Authorized by: Elisa Fairbanks MD     Procedure details: cystoscopy    Patient tolerance: Patient tolerated the procedure well with no immediate complications    Additional Procedure Details: Office Cystoscopy Procedure Note    Indication:    Hematuria    Informed consent   The risks, benefits, complications, treatment options, and expected outcomes were discussed with the patient  The patient concurred with the proposed plan and provided informed consent  Anesthesia  Lidocaine jelly 2%    Antibiotic prophylaxis   None    Procedure  In the presence of a female nurse, the patient was placed in the supine frog-legged position, was prepped and draped in the usual manner using sterile technique, and 2% lidocaine jelly instilled into the urethra  Prior to this pelvic examination showed atrophic labia majora and minora, a small caliber vaginal introitus, moderate to poor quality vaginal mucosa, and showed No pelvic floor descensus and No urethral hypermobility  Upon stress maneuvers there was no stress incontinence  A 17 F flexible cystoscope was then inserted into the urethra and the urethra and bladder carefully examined    The following findings were noted:    Findings:  Urethra:  Normal  Bladder:  Abnormal, tumor measuring approximately 2 centimeters at the right lateral/base of the bladder, appearing mostly low-grade in nature, an additional area measuring approximately 1 centimeter at the left lateral dome of the bladder shows some changes concerning for potential carcinoma in situ versus developing papillary tumor  Ureteral orifices:  Normal  Other findings:  None , retroflexed view confirms    Specimens: None                 Complications:    None; patient tolerated the procedure well           Disposition: To home     Condition: Stable    Plan: Proceed to the operating room for TURBT and bilateral retrogrades of mitomycin, and all indicated procedures  Karmen Leung MD  10/31/2019  8:26 AM  Sign at close encounter       Problem List Items Addressed This Visit        Genitourinary    Gross hematuria on tissue paper after wiping - Primary    Relevant Orders    POCT urine dip (Completed)    Urinalysis with microscopic    Cytology, urine    Case request operating room: TRANSURETHRAL RESECTION OF BLADDER TUMOR (TURBT), mitomycin C instillation, CYSTOSCOPY WITH RETROGRADE PYELOGRAM (Completed)    Bladder tumor    Relevant Orders    Case request operating room: TRANSURETHRAL RESECTION OF BLADDER TUMOR (TURBT), mitomycin C instillation, CYSTOSCOPY WITH RETROGRADE PYELOGRAM (Completed)            Discussion:      I had a nice visit with Andrew Bernard today, in this discussion we discussed the differential diagnosis of gross hematuria, as well as the contributing factor of her heavy smoking history  We talked about the fact that these carcinogens or filter by the kidneys and sit in the bladder causing changes in the DNA of the urothelial cells which can lead to dysplasia and tumors  I reviewed with her in real time her imaging studies which did show a bladder lesion, this is confirmed upon cystoscopy today to be a bladder tumor appearing low-grade in nature  This is at the right lateral wall/base of the bladder, she also has an area at the left lateral superior/dome portion of the bladder that appears to be carcinoma in situ versus a developing tumor      Spoke with her as well about the need for bilateral retrograde pyelography at the time of surgery, with potential ureteral stent placement and all indicated procedures, we also talked about the need for her to stop any blood thinners 1 week prior to her surgery and to check with her medical physician for fitness for surgery  She states that she has previously had a stroke without residual deficits, she also has had multiple issues with other health problems related to smoking  She denies particular chest pain, her functional status is otherwise good per her report  I then spoke with her about bladder cancer, the importance of depth of invasion and staging and differences between low-grade and high-grade bladder tumors and the potential need for repeat TURBT should her pathology show high-grade bladder cancer  We also talked about the need for stenting as above given that her tumor maybe over the area of the intramural ureter  We spoke with her about the risks of infection, bleeding, pain, damage to surrounding structures, need for additional procedures, risk of failure of the procedure, risk of anesthesia, risk of positioning complications including neurapraxia, chronic pain, and paralysis as well as risk of rhabdomyolysis and compartment syndrome  Risk of deep venous thrombosis and venous thromboembolism as well as pneumonia and other potential unpredictable complications were also discussed with the patient  I did tell her about the need for regular surveillance cystoscopy, and the fact that she will have higher than usual risks given her heavy smoking history and her comorbidities      She will undergo medical clearance and optimization as above, she provided informed consent for TURBT and all other indicated procedures as above      Assessment and plan:       Please see problem oriented charting for the assessment plan of today's urological complaints    Patrice Khalil MD      Chief Complaint     Gross hematuria  Heavy smoking history      History of Present Illness     Giulia Aldridge is a 69 y o  woman with a history of gross hematuria, with a potential bladder mass seen on renal bladder ultrasound, she is referred urgently by Dr Alon Mckeon for further urologic evaluation  A copy of today's consultation has been sent to the referring provider in the name of continuity of care  With regard to this complaint it is localized to the bladder, she has noted some blood on her toilet paper when wiping, this has been often on  The quality is described as relatively asymptomatic and the severity of this complaint is described as mild to moderate  These symptoms have been present for off and on 4 weeks to months and the timing is ongoing  Previous treatments include none and previous work-up includes evaluation by her primary care provider and appropriate referral to Urology on an urgent basis  The patient mentions potential blood thinners and nothing else in particular as aggravating and alleviating factors, respectively  The following associated signs and symptoms are mentioned:  None, apart from some anxiety regarding potential bladder cancer  The ultrasound was performed which shows a bladder lesion, and no hydronephrosis  In terms of other complaints today she has none  The following portions of the patient's history were reviewed and updated as appropriate: allergies, current medications, past family history, past medical history, past social history, past surgical history and problem list         Detailed Urologic History     - please refer to HPI    Review of Systems     Review of Systems   Constitutional: Negative  HENT: Negative  Eyes: Negative  Respiratory: Negative  Cardiovascular: Negative  Gastrointestinal: Negative  Endocrine: Negative  Genitourinary:        As per HPI   Musculoskeletal: Negative  Skin: Negative  Allergic/Immunologic: Negative  Neurological: Negative  Hematological: Negative  Psychiatric/Behavioral: Negative                Allergies     Allergies   Allergen Reactions    Atorvastatin Dizziness and Myalgia Other reaction(s): Dizziness/Myalgia    Colesevelam      Other reaction(s): leg pains    Colestipol      Other reaction(s): Swelling, Itching    Ezetimibe      Other reaction(s): heartburn  Other reaction(s): Heart Burn    Fenofibrate      Other reaction(s): blood in urine  hx  Kidney Failure    Pollen Extract     Rosuvastatin      Other reaction(s): Leg Cramping    Statins Myalgia       Physical Exam     Physical Exam   Constitutional: She is oriented to person, place, and time  She appears well-developed and well-nourished  No distress  HENT:   Head: Normocephalic and atraumatic  Nose: Nose normal    Mouth/Throat: Oropharynx is clear and moist  No oropharyngeal exudate  Eyes: Right eye exhibits no discharge  Left eye exhibits no discharge  Neck: No tracheal deviation present  Cardiovascular: Normal rate, regular rhythm and intact distal pulses  Pulmonary/Chest: Effort normal  No stridor  No respiratory distress  Abdominal: Soft  She exhibits no distension and no mass  There is no tenderness  There is no rebound and no guarding  No hernia  Genitourinary:   Genitourinary Comments: Vaginal mucosal atrophy is present, no vaginal lesions   Musculoskeletal: She exhibits no edema, tenderness or deformity  Neurological: She is alert and oriented to person, place, and time  No cranial nerve deficit  Coordination normal    Skin: Skin is warm and dry  No rash noted  She is not diaphoretic  No erythema  No pallor  Psychiatric: She has a normal mood and affect  Her behavior is normal  Judgment and thought content normal    Nursing note and vitals reviewed            Vital Signs  Vitals:    10/31/19 0804   BP: 136/70   BP Location: Left arm   Patient Position: Sitting   Cuff Size: Adult   Weight: 73 9 kg (163 lb)   Height: 4' 10" (1 473 m)         Current Medications       Current Outpatient Medications:     amLODIPine (NORVASC) 5 mg tablet, Take 1 tablet (5 mg total) by mouth 2 (two) times a day, Disp: 60 tablet, Rfl: 11    Cholecalciferol (VITAMIN D3) 1000 units CAPS, Take 1 capsule by mouth every other day , Disp: , Rfl:     cloNIDine (CATAPRES-TTS-2) 0 2 mg/24 hr, Place 1 patch (0 2 mg total) on the skin once a week, Disp: 12 patch, Rfl: 3    clopidogrel (PLAVIX) 75 mg tablet, Take 1 tablet (75 mg total) by mouth daily, Disp: 90 tablet, Rfl: 1    ferrous sulfate 325 (65 Fe) mg tablet, Take 325 mg by mouth daily, Disp: , Rfl:     folic acid (FOLVITE) 852 MCG tablet, Take 800 mcg by mouth daily , Disp: , Rfl:     labetalol (NORMODYNE) 100 mg tablet, TAKE 1 TABLET BY MOUTH TWICE A DAY, Disp: 180 tablet, Rfl: 0    losartan (COZAAR) 100 MG tablet, Take 100 mg by mouth every morning , Disp: , Rfl:     losartan (COZAAR) 50 mg tablet, Take 1 tablet by mouth daily  , Disp: , Rfl:     Omega-3 Fatty Acids (SM FISH OIL) 1200 MG CAPS, Take by mouth 2 (two) times a day, Disp: , Rfl:     traMADol (ULTRAM) 50 mg tablet, Take 1 tablet (50 mg total) by mouth every 8 (eight) hours as needed for moderate pain, Disp: 60 tablet, Rfl: 1      Active Problems     Patient Active Problem List   Diagnosis    Spinal stenosis    Basilar artery stenosis    Breast mass    [facility]    Chronic GERD    CKD (chronic kidney disease), stage III (HCC)    Claudication in peripheral vascular disease ([facility] )    Depression with anxiety    Type 2 diabetes mellitus with diabetic nephropathy (HCC)    Endometriosis    Gout    Hx-TIA (transient ischemic attack)    Hypercholesteremia    Hyperlipidemia associated with type 2 diabetes mellitus ([facility]    Hypertension    Hypertension associated with diabetes ([facility]    Osteoarthritis    Obesity (BMI 30-39  9)    Polyneuropathy    Renal artery stenosis (HCC)    Tobacco use disorder    Vertebrobasilar artery syndrome    Vitamin D deficiency    Statin intolerance    Lumbar disc herniation    Pain of left lower extremity    Abnormal EKG    Spondylosis of lumbar region without myelopathy or radiculopathy    Aortoiliac stenosis, left (HCC)    Hypokalemia    Acute drug-induced gout of left foot    Gross hematuria on tissue paper after wiping    Decreased pulses in feet    Refused influenza vaccine    Hypercalcemia    Lumbosacral spondylosis without myelopathy    Neurodermatitis    Other proteinuria    Obesity with body mass index 30 or greater    Hyperlipidemia, unspecified    Herniated lumbar intervertebral disc    Atherosclerosis of renal artery ([facility])    Bladder tumor         Past Medical History     Past Medical History:   Diagnosis Date    Arthritis     Coronary artery disease     Diabetes mellitus ([facility]     Endometriosis     High cholesterol     Hypertension     Kidney disease, chronic, stage III (moderate, EGFR 30-59 ml/min) ([facility])     Lumbar disc herniation     Peripheral vascular disease ([facility]     Shoulder injury     left    Spinal stenosis     Stroke [facility])          Surgical History     Past Surgical History:   Procedure Laterality Date    ROTATOR CUFF REPAIR Left     TONSILLECTOMY           Family History     Family History   Problem Relation Age of Onset    Hypertension Mother     Heart disease Mother         Valvular    Hyperlipidemia Mother     Hypertension Father     Heart defect Father         Cardiomegaly    Stroke Sister         Cerebrovascular Accident    Arthritis Brother     Other Brother         Back Disorder         Social History     Social History     Social History     Tobacco Use   Smoking Status Current Every Day Smoker    Packs/day: 1 00    Types: Cigarettes   Smokeless Tobacco Never Used         Pertinent Lab Values     Lab Results   Component Value Date    CREATININE 1 42 (H) 05/15/2019                 Pertinent Imaging       The patient's images were reviewed by me personally and also in real time with them in the examination room using our PACS imaging system    The imaging findings are significant for bladder lesion at the lateral/base of the bladder on the right

## 2019-10-31 NOTE — PROGRESS NOTES
Office Cystoscopy Procedure Note    Indication:    Hematuria    Informed consent   The risks, benefits, complications, treatment options, and expected outcomes were discussed with the patient  The patient concurred with the proposed plan and provided informed consent  Anesthesia  Lidocaine jelly 2%    Antibiotic prophylaxis   None    Procedure  In the presence of a female nurse, the patient was placed in the supine frog-legged position, was prepped and draped in the usual manner using sterile technique, and 2% lidocaine jelly instilled into the urethra  Prior to this pelvic examination showed atrophic labia majora and minora, a small caliber vaginal introitus, moderate to poor quality vaginal mucosa, and showed No pelvic floor descensus and No urethral hypermobility  Upon stress maneuvers there was no stress incontinence  A 17 F flexible cystoscope was then inserted into the urethra and the urethra and bladder carefully examined  The following findings were noted:    Findings:  Urethra:  Normal  Bladder:  Abnormal, tumor measuring approximately 2 centimeters at the right lateral/base of the bladder, appearing mostly low-grade in nature, an additional area measuring approximately 1 centimeter at the left lateral dome of the bladder shows some changes concerning for potential carcinoma in situ versus developing papillary tumor  Ureteral orifices:  Normal  Other findings:  None , retroflexed view confirms    Specimens: None                 Complications:    None; patient tolerated the procedure well           Disposition: To home     Condition: Stable    Plan: Proceed to the operating room for TURBT and bilateral retrogrades of mitomycin, and all indicated procedures      Cystoscopy  Date/Time: 10/31/2019 8:28 AM  Performed by: Haven Tariq MD  Authorized by: Haven Tariq MD     Procedure details: cystoscopy    Patient tolerance: Patient tolerated the procedure well with no immediate complications Additional Procedure Details: Office Cystoscopy Procedure Note    Indication:    Hematuria    Informed consent   The risks, benefits, complications, treatment options, and expected outcomes were discussed with the patient  The patient concurred with the proposed plan and provided informed consent  Anesthesia  Lidocaine jelly 2%    Antibiotic prophylaxis   None    Procedure  In the presence of a female nurse, the patient was placed in the supine frog-legged position, was prepped and draped in the usual manner using sterile technique, and 2% lidocaine jelly instilled into the urethra  Prior to this pelvic examination showed atrophic labia majora and minora, a small caliber vaginal introitus, moderate to poor quality vaginal mucosa, and showed No pelvic floor descensus and No urethral hypermobility  Upon stress maneuvers there was no stress incontinence  A 17 F flexible cystoscope was then inserted into the urethra and the urethra and bladder carefully examined  The following findings were noted:    Findings:  Urethra:  Normal  Bladder:  Abnormal, tumor measuring approximately 2 centimeters at the right lateral/base of the bladder, appearing mostly low-grade in nature, an additional area measuring approximately 1 centimeter at the left lateral dome of the bladder shows some changes concerning for potential carcinoma in situ versus developing papillary tumor  Ureteral orifices:  Normal  Other findings:  None , retroflexed view confirms    Specimens: None                 Complications:    None; patient tolerated the procedure well           Disposition: To home     Condition: Stable    Plan: Proceed to the operating room for TURBT and bilateral retrogrades of mitomycin, and all indicated procedures

## 2019-11-01 ENCOUNTER — TELEPHONE (OUTPATIENT)
Dept: INTERNAL MEDICINE CLINIC | Facility: CLINIC | Age: 74
End: 2019-11-01

## 2019-11-04 ENCOUNTER — APPOINTMENT (OUTPATIENT)
Dept: LAB | Facility: HOSPITAL | Age: 74
End: 2019-11-04
Attending: UROLOGY
Payer: COMMERCIAL

## 2019-11-04 ENCOUNTER — OFFICE VISIT (OUTPATIENT)
Dept: LAB | Facility: HOSPITAL | Age: 74
End: 2019-11-04
Attending: UROLOGY
Payer: COMMERCIAL

## 2019-11-04 DIAGNOSIS — I10 HYPERTENSION, UNSPECIFIED TYPE: ICD-10-CM

## 2019-11-04 DIAGNOSIS — E55.9 VITAMIN D DEFICIENCY: ICD-10-CM

## 2019-11-04 DIAGNOSIS — D49.4 BLADDER TUMOR: ICD-10-CM

## 2019-11-04 DIAGNOSIS — F41.8 DEPRESSION WITH ANXIETY: ICD-10-CM

## 2019-11-04 DIAGNOSIS — F17.200 TOBACCO USE DISORDER: ICD-10-CM

## 2019-11-04 DIAGNOSIS — E11.59 HYPERTENSION ASSOCIATED WITH DIABETES (HCC): ICD-10-CM

## 2019-11-04 DIAGNOSIS — N18.30 CKD (CHRONIC KIDNEY DISEASE), STAGE III (HCC): ICD-10-CM

## 2019-11-04 DIAGNOSIS — Z86.73 HX-TIA (TRANSIENT ISCHEMIC ATTACK): ICD-10-CM

## 2019-11-04 DIAGNOSIS — I65.1 BASILAR ARTERY STENOSIS: ICD-10-CM

## 2019-11-04 DIAGNOSIS — R31.0 GROSS HEMATURIA: ICD-10-CM

## 2019-11-04 DIAGNOSIS — M1A.3720 CHRONIC GOUT DUE TO RENAL IMPAIRMENT INVOLVING TOE OF LEFT FOOT WITHOUT TOPHUS: ICD-10-CM

## 2019-11-04 DIAGNOSIS — E11.21 TYPE 2 DIABETES MELLITUS WITH DIABETIC NEPHROPATHY, WITHOUT LONG-TERM CURRENT USE OF INSULIN (HCC): ICD-10-CM

## 2019-11-04 DIAGNOSIS — M48.061 SPINAL STENOSIS OF LUMBAR REGION WITHOUT NEUROGENIC CLAUDICATION: ICD-10-CM

## 2019-11-04 DIAGNOSIS — E87.6 HYPOKALEMIA: ICD-10-CM

## 2019-11-04 DIAGNOSIS — I15.2 HYPERTENSION ASSOCIATED WITH DIABETES (HCC): ICD-10-CM

## 2019-11-04 LAB
ANION GAP SERPL CALCULATED.3IONS-SCNC: 8 MMOL/L (ref 4–13)
ATRIAL RATE: 63 BPM
BASOPHILS # BLD AUTO: 0.06 THOUSANDS/ΜL (ref 0–0.1)
BASOPHILS NFR BLD AUTO: 1 % (ref 0–1)
BUN SERPL-MCNC: 24 MG/DL (ref 5–25)
CALCIUM SERPL-MCNC: 9.9 MG/DL (ref 8.3–10.1)
CHLORIDE SERPL-SCNC: 105 MMOL/L (ref 100–108)
CO2 SERPL-SCNC: 30 MMOL/L (ref 21–32)
CREAT SERPL-MCNC: 1.56 MG/DL (ref 0.6–1.3)
CREAT UR-MCNC: 144 MG/DL
EOSINOPHIL # BLD AUTO: 0.26 THOUSAND/ΜL (ref 0–0.61)
EOSINOPHIL NFR BLD AUTO: 3 % (ref 0–6)
ERYTHROCYTE [DISTWIDTH] IN BLOOD BY AUTOMATED COUNT: 13.3 % (ref 11.6–15.1)
EST. AVERAGE GLUCOSE BLD GHB EST-MCNC: 117 MG/DL
GFR SERPL CREATININE-BSD FRML MDRD: 33 ML/MIN/1.73SQ M
GLUCOSE P FAST SERPL-MCNC: 171 MG/DL (ref 65–99)
HBA1C MFR BLD: 5.7 % (ref 4.2–6.3)
HCT VFR BLD AUTO: 46.4 % (ref 34.8–46.1)
HGB BLD-MCNC: 14.9 G/DL (ref 11.5–15.4)
IMM GRANULOCYTES # BLD AUTO: 0.05 THOUSAND/UL (ref 0–0.2)
IMM GRANULOCYTES NFR BLD AUTO: 1 % (ref 0–2)
LYMPHOCYTES # BLD AUTO: 1.86 THOUSANDS/ΜL (ref 0.6–4.47)
LYMPHOCYTES NFR BLD AUTO: 22 % (ref 14–44)
MCH RBC QN AUTO: 28.8 PG (ref 26.8–34.3)
MCHC RBC AUTO-ENTMCNC: 32.1 G/DL (ref 31.4–37.4)
MCV RBC AUTO: 90 FL (ref 82–98)
MICROALBUMIN UR-MCNC: 1140 MG/L (ref 0–20)
MICROALBUMIN/CREAT 24H UR: 792 MG/G CREATININE (ref 0–30)
MONOCYTES # BLD AUTO: 0.67 THOUSAND/ΜL (ref 0.17–1.22)
MONOCYTES NFR BLD AUTO: 8 % (ref 4–12)
NEUTROPHILS # BLD AUTO: 5.42 THOUSANDS/ΜL (ref 1.85–7.62)
NEUTS SEG NFR BLD AUTO: 65 % (ref 43–75)
NRBC BLD AUTO-RTO: 0 /100 WBCS
P AXIS: 64 DEGREES
PLATELET # BLD AUTO: 258 THOUSANDS/UL (ref 149–390)
PMV BLD AUTO: 10.7 FL (ref 8.9–12.7)
POTASSIUM SERPL-SCNC: 4.7 MMOL/L (ref 3.5–5.3)
PR INTERVAL: 174 MS
QRS AXIS: 2 DEGREES
QRSD INTERVAL: 84 MS
QT INTERVAL: 402 MS
QTC INTERVAL: 411 MS
RBC # BLD AUTO: 5.17 MILLION/UL (ref 3.81–5.12)
SODIUM SERPL-SCNC: 143 MMOL/L (ref 136–145)
T WAVE AXIS: 102 DEGREES
VENTRICULAR RATE: 63 BPM
WBC # BLD AUTO: 8.32 THOUSAND/UL (ref 4.31–10.16)

## 2019-11-04 PROCEDURE — 83036 HEMOGLOBIN GLYCOSYLATED A1C: CPT

## 2019-11-04 PROCEDURE — 36415 COLL VENOUS BLD VENIPUNCTURE: CPT

## 2019-11-04 PROCEDURE — 93005 ELECTROCARDIOGRAM TRACING: CPT

## 2019-11-04 PROCEDURE — 85025 COMPLETE CBC W/AUTO DIFF WBC: CPT

## 2019-11-04 PROCEDURE — 80048 BASIC METABOLIC PNL TOTAL CA: CPT

## 2019-11-04 PROCEDURE — 82043 UR ALBUMIN QUANTITATIVE: CPT

## 2019-11-04 PROCEDURE — 82570 ASSAY OF URINE CREATININE: CPT

## 2019-11-04 PROCEDURE — 93010 ELECTROCARDIOGRAM REPORT: CPT | Performed by: INTERNAL MEDICINE

## 2019-11-04 NOTE — PRE-PROCEDURE INSTRUCTIONS
Pre-Surgery Instructions:   Medication Instructions    amLODIPine (NORVASC) 5 mg tablet Instructed patient per Anesthesia Guidelines   Cholecalciferol (VITAMIN D3) 1000 units CAPS Instructed patient per Anesthesia Guidelines   cloNIDine (CATAPRES-TTS-2) 0 2 mg/24 hr Instructed patient per Anesthesia Guidelines   clopidogrel (PLAVIX) 75 mg tablet Pt instructed to hold one week prior to surgery per Dr Miguel Nettles folic acid (FOLVITE) 006 MCG tablet Instructed patient per Anesthesia Guidelines   labetalol (NORMODYNE) 100 mg tablet Instructed patient per Anesthesia Guidelines   losartan (COZAAR) 100 MG tablet Pt instructed to hold morning of surgery    losartan (COZAAR) 50 mg tablet Instructed patient per Anesthesia Guidelines   Omega-3 Fatty Acids (SM FISH OIL) 1200 MG CAPS Pt instructed to hold one week prior to surgery    traMADol (ULTRAM) 50 mg tablet Instructed patient per Anesthesia Guidelines

## 2019-11-05 PROBLEM — I77.1 CELIAC ARTERY STENOSIS (HCC): Status: ACTIVE | Noted: 2019-11-05

## 2019-11-07 ENCOUNTER — OFFICE VISIT (OUTPATIENT)
Dept: INTERNAL MEDICINE CLINIC | Facility: CLINIC | Age: 74
End: 2019-11-07
Payer: COMMERCIAL

## 2019-11-07 VITALS
DIASTOLIC BLOOD PRESSURE: 80 MMHG | BODY MASS INDEX: 34.22 KG/M2 | TEMPERATURE: 97.6 F | OXYGEN SATURATION: 99 % | HEIGHT: 58 IN | HEART RATE: 71 BPM | SYSTOLIC BLOOD PRESSURE: 170 MMHG | RESPIRATION RATE: 19 BRPM | WEIGHT: 163 LBS

## 2019-11-07 DIAGNOSIS — I65.1 BASILAR ARTERY STENOSIS: ICD-10-CM

## 2019-11-07 DIAGNOSIS — E11.21 TYPE 2 DIABETES MELLITUS WITH DIABETIC NEPHROPATHY, WITHOUT LONG-TERM CURRENT USE OF INSULIN (HCC): Primary | ICD-10-CM

## 2019-11-07 DIAGNOSIS — I70.1 RENAL ARTERY STENOSIS (HCC): ICD-10-CM

## 2019-11-07 DIAGNOSIS — R31.0 GROSS HEMATURIA: ICD-10-CM

## 2019-11-07 DIAGNOSIS — I77.1 CELIAC ARTERY STENOSIS (HCC): ICD-10-CM

## 2019-11-07 DIAGNOSIS — R94.31 ABNORMAL EKG: Primary | ICD-10-CM

## 2019-11-07 DIAGNOSIS — F17.200 TOBACCO USE DISORDER: ICD-10-CM

## 2019-11-07 DIAGNOSIS — R94.31 ABNORMAL EKG: ICD-10-CM

## 2019-11-07 DIAGNOSIS — D49.4 BLADDER TUMOR: ICD-10-CM

## 2019-11-07 DIAGNOSIS — R93.89 ABNORMAL CT OF THE CHEST: ICD-10-CM

## 2019-11-07 DIAGNOSIS — E55.9 VITAMIN D DEFICIENCY: ICD-10-CM

## 2019-11-07 DIAGNOSIS — M1A.3720 CHRONIC GOUT DUE TO RENAL IMPAIRMENT INVOLVING TOE OF LEFT FOOT WITHOUT TOPHUS: ICD-10-CM

## 2019-11-07 PROCEDURE — 99215 OFFICE O/P EST HI 40 MIN: CPT | Performed by: INTERNAL MEDICINE

## 2019-11-07 NOTE — LETTER
November 7, 2019     Rosie Benson MD  [address] [facility] [address]    Patient: Mal Cervantes   YOB: 1945   Date of Visit: 11/7/2019       Dear Dr Gross Army: Thank you for referring Lian Al to me for evaluation  Below are my notes for this consultation  If you have questions, please do not hesitate to call me  I look forward to following your patient along with you  Sincerely,        Namrata Grant DO        CC: MD Namrata Tidwell DO  11/7/2019  2:18 PM  Sign at close encounter   Subjective:      Mal Cervantes is a 69 y o  female who presents to the office today for a preoperative consultation at the request of surgeon Kathy Baires who plans on performing TURBT bladder tumor rescection possible stent on November 12  This consultation is requested for the specific conditions prompting preoperative evaluation-none  Planned anesthesia is general  The patient has the following known anesthesia issues: none  Patient has a bleeding risk of: no recent abnormal bleeding  Patient does not have objections to receiving blood products if needed  The following portions of the patient's history were reviewed and updated as appropriate: allergies, current medications, past family history, past medical history, past social history, past surgical history and problem list     Review of Systems  Review of Systems   Constitutional: Negative for appetite change, chills, fatigue, fever and unexpected weight change  HENT: Negative for congestion, ear pain, facial swelling, hearing loss, mouth sores, nosebleeds, postnasal drip, rhinorrhea, sinus pain, sore throat, trouble swallowing and voice change  Eyes: Negative for pain, discharge, redness and visual disturbance  Respiratory: Negative for apnea, chest tightness, shortness of breath, wheezing and stridor  Cardiovascular: Negative for chest pain, palpitations and leg swelling     Gastrointestinal: Negative for abdominal distention, abdominal pain, blood in stool, constipation, diarrhea and vomiting  Endocrine: Negative for cold intolerance, heat intolerance, polydipsia, polyphagia and polyuria  Genitourinary: Negative for difficulty urinating, dysuria, flank pain, frequency, genital sores, hematuria and urgency  Musculoskeletal: Negative for arthralgias and back pain  Skin: Negative for rash and wound  Allergic/Immunologic: Negative for environmental allergies, food allergies and immunocompromised state  Neurological: Negative for dizziness, tremors, seizures, syncope, facial asymmetry, speech difficulty, weakness, light-headedness, numbness and headaches  Hematological: Negative for adenopathy  Does not bruise/bleed easily  Psychiatric/Behavioral: Negative for agitation, behavioral problems, dysphoric mood, hallucinations, self-injury, sleep disturbance and suicidal ideas  The patient is not hyperactive              Objective:      Physical Exam  Pulse 71   Temp 97 6 °F (36 4 °C) (Tympanic)   Resp 19   Ht 4' 10" (1 473 m)   Wt 73 9 kg (163 lb)   LMP  (LMP Unknown)   SpO2 99%   BMI 34 07 kg/m²      General Appearance:    Alert, cooperative, no distress, appears stated age   Head:    Normocephalic, without obvious abnormality, atraumatic   Eyes:    PERRL, conjunctiva/corneas clear, EOM's intact, fundi     benign, both eyes   Ears:    Normal TM's and external ear canals, both ears   Nose:   Nares normal, septum midline, mucosa normal, no drainage    or sinus tenderness   Throat:   Lips, mucosa, and tongue normal; teeth and gums normal   Neck:   Supple, symmetrical, trachea midline, no adenopathy;     thyroid:  no enlargement/tenderness/nodules; no carotid    bruit or JVD   Back:     Symmetric, no curvature, ROM normal, no CVA tenderness   Lungs:     Clear to auscultation bilaterally, respirations unlabored   Chest Wall:    No tenderness or deformity    Heart:    Regular rate and rhythm, S1 and S2 normal, no murmur, rub   or gallop   Breast Exam:    No tenderness, masses, or nipple abnormality   Abdomen:     Soft, non-tender, bowel sounds active all four quadrants,     no masses, no organomegaly   Genitalia:     Rectal:     Extremities:   Extremities normal, atraumatic, no cyanosis or edema   Pulses:   Decreased in lower extremities   Skin:   Skin color, texture, turgor normal, no rashes or lesions   Lymph nodes:   Cervical, supraclavicular, and axillary nodes normal   Neurologic:   CNII-XII intact, normal strength, sensation and reflexes     throughout     Vitals:    19 1321   Pulse: 71   Resp: 19   Temp: 97 6 °F (36 4 °C)   TempSrc: Tympanic   SpO2: 99%   Weight: 73 9 kg (163 lb)   Height: 4' 10" (1 473 m)       Predictors of intubation difficulty:   Morbid obesity? no   Anatomically abnormal facies? no   Prominent incisors? no   Receding mandible? no   Short, thick neck? yes -      Neck range of motion: normal    Mouth openincm   Dentition: Missing teeth (yes)       Assessment:      69 y o  female with planned surgery as above  Known risk factors for perioperative complications: Undernutrition  tobacco abuse, difuse ASCVD    Difficulty with intubation is not anticipated  Cardiac Risk Estimation: per the Revised Cardiac Risk Index (Circ  100:1043, 1999), the patient's risk factors for cardiac complications include moderate risk  Current medications which may produce withdrawal symptoms if withheld perioperatively: fish oil, plavix iron  Plan:      1  Preoperative workup as follows cardiac stress testing to exclude undiagnosed coronary disease (  )   2  Change in medication regimen before surgery: To stop iron fish oil and Plavix before surgery which he is already stopped  3  Prophylaxis for cardiac events with perioperative beta-blockers: not indicated  4  Invasive hemodynamic monitoring perioperatively: not indicated    5  Deep vein thrombosis prophylaxis postoperatively:regimen to be chosen by surgical team   6  Other measures: Strongly recommend she stop smoking before surgery   Stopped fish oil plavix iron on 10/31/19  The patient is at No Risk and Moderate Risk for cardiac event during surgery  1 patient with diffuse atherosclerotic disease will do stress test preoperatively to rule out ischemic heart disease if she passes stress test should be cleared for surgery as long as  2  Long history of tobacco abuse will get pulmonary function test to assess pulmonary function as long as her FEV1 is greater than 1 patient be medically cleared for surgery  3  Patient with abnormal EKG will check echocardiogram to see if she had an old myocardial infarction she has no symptoms of ischemic heart disease at this time compared to her EKG from 6 months ago there was no major changes  4  Patient with bladder tumor will be medically cleared at the above are unremarkable  5  Patient with chronic hypertension chronic kidney disease which is stable for some time is followed closely by Nephrology for many years Dr Rosangela Bowen will continue medication, patient is renal artery stenosis likely strongly contributing to her difficult to control high blood pressure  6  Strongly recommend tobacco cessation patient understands that most of her medical problems are related to tobacco abuse is unwilling to consider smoking cessation at this time  7  Abnormal CT scan of the chest not strongly suspicious for tumor question of bronchiolitis will be referring patient to Pulmonary after surgery  8  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

## 2019-11-07 NOTE — PROGRESS NOTES
Subjective:      Kian Angel is a 69 y o  female who presents to the office today for a preoperative consultation at the request of surgeon Daniel Jones who plans on performing TURBT bladder tumor rescection possible stent on November 12  This consultation is requested for the specific conditions prompting preoperative evaluation-none  Planned anesthesia is general  The patient has the following known anesthesia issues: none  Patient has a bleeding risk of: no recent abnormal bleeding  Patient does not have objections to receiving blood products if needed  The following portions of the patient's history were reviewed and updated as appropriate: allergies, current medications, past family history, past medical history, past social history, past surgical history and problem list     Review of Systems  Review of Systems   Constitutional: Negative for appetite change, chills, fatigue, fever and unexpected weight change  HENT: Negative for congestion, ear pain, facial swelling, hearing loss, mouth sores, nosebleeds, postnasal drip, rhinorrhea, sinus pain, sore throat, trouble swallowing and voice change  Eyes: Negative for pain, discharge, redness and visual disturbance  Respiratory: Negative for apnea, chest tightness, shortness of breath, wheezing and stridor  Cardiovascular: Negative for chest pain, palpitations and leg swelling  Gastrointestinal: Negative for abdominal distention, abdominal pain, blood in stool, constipation, diarrhea and vomiting  Endocrine: Negative for cold intolerance, heat intolerance, polydipsia, polyphagia and polyuria  Genitourinary: Negative for difficulty urinating, dysuria, flank pain, frequency, genital sores, hematuria and urgency  Musculoskeletal: Negative for arthralgias and back pain  Skin: Negative for rash and wound  Allergic/Immunologic: Negative for environmental allergies, food allergies and immunocompromised state     Neurological: Negative for dizziness, tremors, seizures, syncope, facial asymmetry, speech difficulty, weakness, light-headedness, numbness and headaches  Hematological: Negative for adenopathy  Does not bruise/bleed easily  Psychiatric/Behavioral: Negative for agitation, behavioral problems, dysphoric mood, hallucinations, self-injury, sleep disturbance and suicidal ideas  The patient is not hyperactive              Objective:      Physical Exam  Pulse 71   Temp 97 6 °F (36 4 °C) (Tympanic)   Resp 19   Ht 4' 10" (1 473 m)   Wt 73 9 kg (163 lb)   LMP  (LMP Unknown)   SpO2 99%   BMI 34 07 kg/m²     General Appearance:    Alert, cooperative, no distress, appears stated age   Head:    Normocephalic, without obvious abnormality, atraumatic   Eyes:    PERRL, conjunctiva/corneas clear, EOM's intact, fundi     benign, both eyes   Ears:    Normal TM's and external ear canals, both ears   Nose:   Nares normal, septum midline, mucosa normal, no drainage    or sinus tenderness   Throat:   Lips, mucosa, and tongue normal; teeth and gums normal   Neck:   Supple, symmetrical, trachea midline, no adenopathy;     thyroid:  no enlargement/tenderness/nodules; no carotid    bruit or JVD   Back:     Symmetric, no curvature, ROM normal, no CVA tenderness   Lungs:     Clear to auscultation bilaterally, respirations unlabored   Chest Wall:    No tenderness or deformity    Heart:    Regular rate and rhythm, S1 and S2 normal, no murmur, rub   or gallop   Breast Exam:    No tenderness, masses, or nipple abnormality   Abdomen:     Soft, non-tender, bowel sounds active all four quadrants,     no masses, no organomegaly   Genitalia:     Rectal:     Extremities:   Extremities normal, atraumatic, no cyanosis or edema   Pulses:   Decreased in lower extremities   Skin:   Skin color, texture, turgor normal, no rashes or lesions   Lymph nodes:   Cervical, supraclavicular, and axillary nodes normal   Neurologic:   CNII-XII intact, normal strength, sensation and reflexes     throughout     Vitals:    19 1321   Pulse: 71   Resp: 19   Temp: 97 6 °F (36 4 °C)   TempSrc: Tympanic   SpO2: 99%   Weight: 73 9 kg (163 lb)   Height: 4' 10" (1 473 m)       Predictors of intubation difficulty:   Morbid obesity? no   Anatomically abnormal facies? no   Prominent incisors? no   Receding mandible? no   Short, thick neck? yes -      Neck range of motion: normal    Mouth openincm   Dentition: Missing teeth (yes)       Assessment:      69 y o  female with planned surgery as above  Known risk factors for perioperative complications: Undernutrition  tobacco abuse, difuse ASCVD    Difficulty with intubation is not anticipated  Cardiac Risk Estimation: per the Revised Cardiac Risk Index (Circ  [id number], 1999), the patient's risk factors for cardiac complications include moderate risk  Current medications which may produce withdrawal symptoms if withheld perioperatively: fish oil, plavix iron  Plan:      1  Preoperative workup as follows cardiac stress testing to exclude undiagnosed coronary disease (  )   2  Change in medication regimen before surgery: To stop iron fish oil and Plavix before surgery which he is already stopped  3  Prophylaxis for cardiac events with perioperative beta-blockers: not indicated  4  Invasive hemodynamic monitoring perioperatively: not indicated  5  Deep vein thrombosis prophylaxis postoperatively:regimen to be chosen by surgical team   6  Other measures: Strongly recommend she stop smoking before surgery   Stopped fish oil plavix iron on 10/31/19  The patient is at No Risk and Moderate Risk for cardiac event during surgery  1 patient with diffuse atherosclerotic disease will do stress test preoperatively to rule out ischemic heart disease if she passes stress test should be cleared for surgery as long as  2   Long history of tobacco abuse will get pulmonary function test to assess pulmonary function as long as her FEV1 is greater than 1 patient be medically cleared for surgery  3  Patient with abnormal EKG will check echocardiogram to see if she had an old myocardial infarction she has no symptoms of ischemic heart disease at this time compared to her EKG from 6 months ago there was no major changes  4  Patient with bladder tumor will be medically cleared at the above are unremarkable  5  Patient with chronic hypertension chronic kidney disease which is stable for some time is followed closely by Nephrology for many years Dr Nathalia Colby will continue medication, patient is renal artery stenosis likely strongly contributing to her difficult to control high blood pressure  6  Strongly recommend tobacco cessation patient understands that most of her medical problems are related to tobacco abuse is unwilling to consider smoking cessation at this time  7  Abnormal CT scan of the chest not strongly suspicious for tumor question of bronchiolitis will be referring patient to Pulmonary after surgery  8  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

## 2019-11-07 NOTE — PATIENT INSTRUCTIONS
1 patient with diffuse atherosclerotic disease will do stress test preoperatively to rule out ischemic heart disease if she passes stress test should be cleared for surgery as long as  2  Long history of tobacco abuse will get pulmonary function test to assess pulmonary function as long as her FEV1 is greater than 1 patient be medically cleared for surgery  3  Patient with abnormal EKG will check echocardiogram to see if she had an old myocardial infarction she has no symptoms of ischemic heart disease at this time compared to her EKG from 6 months ago there was no major changes  4  Patient with bladder tumor will be medically cleared at the above are unremarkable  5  Patient with chronic hypertension chronic kidney disease which is stable for some time is followed closely by Nephrology for many years Dr Jenn Brady will continue medication, patient is renal artery stenosis likely strongly contributing to her difficult to control high blood pressure  6  Strongly recommend tobacco cessation patient understands that most of her medical problems are related to tobacco abuse is unwilling to consider smoking cessation at this time  7  Abnormal CT scan of the chest not strongly suspicious for tumor question of bronchiolitis will be referring patient to Pulmonary after surgery  8  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

## 2019-11-08 ENCOUNTER — APPOINTMENT (OUTPATIENT)
Dept: LAB | Facility: HOSPITAL | Age: 74
End: 2019-11-08
Attending: UROLOGY
Payer: COMMERCIAL

## 2019-11-08 ENCOUNTER — HOSPITAL ENCOUNTER (OUTPATIENT)
Dept: NON INVASIVE DIAGNOSTICS | Facility: HOSPITAL | Age: 74
Discharge: HOME | End: 2019-11-08
Attending: INTERNAL MEDICINE
Payer: COMMERCIAL

## 2019-11-08 ENCOUNTER — HOSPITAL ENCOUNTER (OUTPATIENT)
Dept: NUCLEAR MEDICINE | Facility: HOSPITAL | Age: 74
Discharge: HOME | End: 2019-11-08
Attending: INTERNAL MEDICINE
Payer: COMMERCIAL

## 2019-11-08 ENCOUNTER — TRANSCRIBE ORDERS (OUTPATIENT)
Dept: LAB | Facility: HOSPITAL | Age: 74
End: 2019-11-08
Payer: COMMERCIAL

## 2019-11-08 DIAGNOSIS — I65.1 BASILAR ARTERY STENOSIS: ICD-10-CM

## 2019-11-08 DIAGNOSIS — R31.0 GROSS HEMATURIA: ICD-10-CM

## 2019-11-08 DIAGNOSIS — F17.200 TOBACCO USE DISORDER: ICD-10-CM

## 2019-11-08 DIAGNOSIS — I77.1 CELIAC ARTERY STENOSIS (HCC): ICD-10-CM

## 2019-11-08 DIAGNOSIS — R94.31 ABNORMAL EKG: ICD-10-CM

## 2019-11-08 DIAGNOSIS — I70.1 RENAL ARTERY STENOSIS (HCC): ICD-10-CM

## 2019-11-08 DIAGNOSIS — R31.0 GROSS HEMATURIA: Primary | ICD-10-CM

## 2019-11-08 DIAGNOSIS — E11.21 TYPE 2 DIABETES MELLITUS WITH DIABETIC NEPHROPATHY, WITHOUT LONG-TERM CURRENT USE OF INSULIN (HCC): ICD-10-CM

## 2019-11-08 LAB
BACTERIA UR QL AUTO: ABNORMAL /HPF
BILIRUB UR QL STRIP: NEGATIVE
CLARITY UR: ABNORMAL
COLOR UR: YELLOW
GLUCOSE UR STRIP-MCNC: NEGATIVE MG/DL
HGB UR QL STRIP.AUTO: ABNORMAL
KETONES UR STRIP-MCNC: NEGATIVE MG/DL
LEUKOCYTE ESTERASE UR QL STRIP: NEGATIVE
MAX DIASTOLIC BP: 84 MMHG
MAX HEART RATE: 84 BPM
MAX PREDICTED HEART RATE: 148 BPM
MAX. SYSTOLIC BP: 206 MMHG
NITRITE UR QL STRIP: NEGATIVE
NON-SQ EPI CELLS URNS QL MICRO: ABNORMAL /HPF
PH UR STRIP.AUTO: 6 [PH]
PROT UR STRIP-MCNC: ABNORMAL MG/DL
PROTOCOL NAME: NORMAL
RBC #/AREA URNS AUTO: ABNORMAL /HPF
REASON FOR TERMINATION: NORMAL
SP GR UR STRIP.AUTO: 1.01 (ref 1–1.03)
TARGET HR FORMULA: NORMAL
TEST INDICATION: NORMAL
TIME IN EXERCISE PHASE: NORMAL
UROBILINOGEN UR QL STRIP.AUTO: 0.2 E.U./DL
WBC #/AREA URNS AUTO: ABNORMAL /HPF

## 2019-11-08 PROCEDURE — 78452 HT MUSCLE IMAGE SPECT MULT: CPT

## 2019-11-08 PROCEDURE — 81001 URINALYSIS AUTO W/SCOPE: CPT | Performed by: UROLOGY

## 2019-11-08 PROCEDURE — 88112 CYTOPATH CELL ENHANCE TECH: CPT | Performed by: PATHOLOGY

## 2019-11-08 PROCEDURE — 93016 CV STRESS TEST SUPVJ ONLY: CPT

## 2019-11-08 PROCEDURE — A9502 TC99M TETROFOSMIN: HCPCS

## 2019-11-08 PROCEDURE — 87086 URINE CULTURE/COLONY COUNT: CPT

## 2019-11-08 PROCEDURE — 93018 CV STRESS TEST I&R ONLY: CPT

## 2019-11-08 PROCEDURE — 93306 TTE W/DOPPLER COMPLETE: CPT | Performed by: INTERNAL MEDICINE

## 2019-11-08 PROCEDURE — 93306 TTE W/DOPPLER COMPLETE: CPT

## 2019-11-08 PROCEDURE — 93017 CV STRESS TEST TRACING ONLY: CPT

## 2019-11-08 RX ADMIN — REGADENOSON 0.4 MG: 0.08 INJECTION, SOLUTION INTRAVENOUS at 10:30

## 2019-11-09 LAB — BACTERIA UR CULT: NORMAL

## 2019-11-11 ENCOUNTER — TELEPHONE (OUTPATIENT)
Dept: UROLOGY | Facility: MEDICAL CENTER | Age: 74
End: 2019-11-11

## 2019-11-11 NOTE — TELEPHONE ENCOUNTER
Patient of Dr Bella Palencia seen in [facility], from Dr Flor Perez office, calling to confirm that surgery has been cancelled      She can be reached at [phone]

## 2019-11-11 NOTE — TELEPHONE ENCOUNTER
Patient called Vee/Dr Laine Martinez office this AM stating she had a call (unknown from who) advising that her surgery for 11/12/19 was canceled  Patient stated she did not know her surgery was canceled or why  Vee and I looked over the chart, EKG and Stress test ordered by Dr Laine Martinez were completed 11/8/19 but the Pulmonary Function test is pending  Patient has an appointment with Pulmonary 12/4/19 to obtain testing and clearance  It appears the patient received the call from Pulmonary  Lolita Samaniego will call Pulmonary to confirm and then call patient to advise

## 2019-11-11 NOTE — TELEPHONE ENCOUNTER
Called and spoke to Radha Baugh and made her aware that surgery has been cancelled  Radha Baugh reports that she is aware that surgery was canceled she was calling for the reason why  Made her aware that message will be sent to surgery scheduler and we will have her call her back  Can you please call Radha Baugh to discuss why surgery was canceled

## 2019-11-12 ENCOUNTER — TELEPHONE (OUTPATIENT)
Dept: CARDIOLOGY CLINIC | Facility: CLINIC | Age: 74
End: 2019-11-12

## 2019-11-12 DIAGNOSIS — R94.39 POSITIVE CARDIAC STRESS TEST: Primary | ICD-10-CM

## 2019-11-12 NOTE — TELEPHONE ENCOUNTER
Spoke to Graham Godoy about the pt's surgery  She is aware that the surgery is canceled  She also said that the pt had a stress test and failed it with cardiology  She is scheduled to see cardiology and pulmonology and Graham Godoy will be discussing those appts with her  Once the pt is cleared by both cardiology and pulmonology I will reschedule her surgery

## 2019-11-12 NOTE — TELEPHONE ENCOUNTER
Alexandru Ge, called and stated that pt was suppose to have bladder tumor surgery and it was cancelled due to a positive nuclear stress test  Alexandru Ge would like to know if any of the providers are able to see pt ASAP  Please Advise   Pt will be a New PT

## 2019-11-13 ENCOUNTER — TELEPHONE (OUTPATIENT)
Dept: INTERNAL MEDICINE CLINIC | Facility: CLINIC | Age: 74
End: 2019-11-13

## 2019-11-13 NOTE — TELEPHONE ENCOUNTER
Spoke with Dr Dwaine Rueda  Left detailed message for patient ~ asked to call office back  Is she willing to go to [place] office or does she want to be seen in [place] office  [facility] Dr Dw[facility]ued[facility]  She can be added to his schedule for tomorrow  Can you please try to call patient again later today to offer appointment? Thanks

## 2019-11-13 NOTE — TELEPHONE ENCOUNTER
Dr Мария Breaux, can you see patient tomorrow? There are no other providers available tomorrow or Friday

## 2019-11-14 DIAGNOSIS — R93.89 ABNORMAL CT OF THE CHEST: Primary | ICD-10-CM

## 2019-11-18 ENCOUNTER — TELEPHONE (OUTPATIENT)
Dept: CARDIOLOGY CLINIC | Facility: CLINIC | Age: 74
End: 2019-11-18

## 2019-11-26 ENCOUNTER — CONSULT (OUTPATIENT)
Dept: CARDIOLOGY CLINIC | Facility: CLINIC | Age: 74
End: 2019-11-26
Payer: COMMERCIAL

## 2019-11-26 VITALS
SYSTOLIC BLOOD PRESSURE: 170 MMHG | DIASTOLIC BLOOD PRESSURE: 80 MMHG | HEIGHT: 58 IN | WEIGHT: 154 LBS | BODY MASS INDEX: 32.32 KG/M2 | OXYGEN SATURATION: 98 % | HEART RATE: 66 BPM

## 2019-11-26 DIAGNOSIS — R06.02 SHORTNESS OF BREATH: ICD-10-CM

## 2019-11-26 DIAGNOSIS — R94.39 POSITIVE CARDIAC STRESS TEST: ICD-10-CM

## 2019-11-26 DIAGNOSIS — N18.30 STAGE 3 CHRONIC KIDNEY DISEASE (HCC): ICD-10-CM

## 2019-11-26 DIAGNOSIS — I10 HYPERTENSION, ESSENTIAL: Primary | ICD-10-CM

## 2019-11-26 DIAGNOSIS — E78.2 HYPERLIPEMIA, MIXED: ICD-10-CM

## 2019-11-26 DIAGNOSIS — F17.200 SMOKING: ICD-10-CM

## 2019-11-26 PROCEDURE — 99204 OFFICE O/P NEW MOD 45 MIN: CPT | Performed by: INTERNAL MEDICINE

## 2019-11-26 NOTE — PROGRESS NOTES
[facility]  [address] [place] [address]  Cardiology Follow Up    Kam Polanco  1945  [id number]      1  Hypertension, essential  CBC and differential    Protime-INR   2  Positive cardiac stress test  Ambulatory referral to Cardiology    Protime-INR    Cardiac coronary angio/lhc/pci   3  Hyperlipemia, mixed     4  Smoking     5  Stage 3 chronic kidney disease (HCC)  Basic metabolic panel   6  Shortness of breath  Cardiac coronary angio/lhc/pci       Chief Complaint   Patient presents with    Consult     had stress test 11/8/19 ~ test was ordered as part of pre-op exam         Interval History:  Patient presents for cardiology evaluation and consultation  Patient had stress test as a part of preoperative cardiovascular risk assessment  Patient's stress test was abnormal   Patient does not have any history of coronary artery disease or MI in the past   Patient however does have significant risk factors for coronary artery disease including hypertension, hyperlipidemia, smoking, family history of coronary disease as well as peripheral vascular disease  Patient does have shortness of breath with exertion  Patient has not had any cardiac evaluation like cardiac catheterization in the past   Patient  does have peripheral vascular disease followed by vascular surgery  Patient also has history of chronic kidney disease followed by Dr Roberts  She states that she has been compliant with all her present medications  She has had history of uncontrolled hypertension  Patient was scheduled for a renal vascular ultrasound    Patient Active Problem List   Diagnosis    Spinal stenosis    Basilar artery stenosis    Breast mass    Cerebrovascular accident (CVA) ([address]    Chronic GERD    CKD (chronic kidney disease), stage III ([address] )    Claudication in peripheral vascular disease ([address] )    Depression with anxiety    Type 2 diabetes mellitus with diabetic nephropathy ([facility] )    Endometriosis    Gout    Hx-TIA (transient ischemic attack)    Hypercholesteremia    Hyperlipidemia associated with type 2 diabetes mellitus ([facility] )    Hypertension    Hypertension associated with diabetes ([facility] )    Osteoarthritis    Obesity (BMI 30-39  9)    Polyneuropathy    Renal artery stenosis (HCC)    Tobacco use disorder    Vertebrobasilar artery syndrome    Vitamin D deficiency    Statin intolerance    Lumbar disc herniation    Pain of left lower extremity    Abnormal EKG    Spondylosis of lumbar region without myelopathy or radiculopathy    Aortoiliac stenosis, left (HCC)    Hypokalemia    Acute drug-induced gout of left foot    Gross hematuria on tissue paper after wiping    Decreased pulses in feet    Refused influenza vaccine    Hypercalcemia    Lumbosacral spondylosis without myelopathy    Neurodermatitis    Other proteinuria    Obesity with body mass index 30 or greater    Hyperlipidemia, unspecified    Herniated lumbar intervertebral disc    Atherosclerosis of renal artery (HCC)    Bladder tumor    Celiac artery stenosis (HCC) >70% stenosis in the celiac trunk    Abnormal CT of the chest suspicious for an infectious or inflammatory bronchiolitis      Positive cardiac stress test  small, mildly severe, partially reversible myocardial perfusion defect of anterior and inferior wall      Past Medical History:   Diagnosis Date    Arthritis     Coronary artery disease     Diabetes mellitus (HCC)     Endometriosis     High cholesterol     Hypertension     Kidney disease, chronic, stage III (moderate, EGFR 30-59 ml/min) (HCC)     Lumbar disc herniation     Peripheral vascular disease (HCC)     Shoulder injury     left    Spinal stenosis     Stroke [facility])      Social History     Socioeconomic History    Marital status: /Civil Union     Spouse name: Not on file    Number of children: Not on file    Years of education: Not on file   Harshad Petit Highest education level: Not on file   Occupational History    Not on file   Social Needs    Financial resource strain: Not on file    Food insecurity:     Worry: Not on file     Inability: Not on file    Transportation needs:     Medical: Not on file     Non-medical: Not on file   Tobacco Use    Smoking status: Current Every Day Smoker     Packs/day: 1 00     Types: Cigarettes    Smokeless tobacco: Never Used   Substance and Sexual Activity    Alcohol use: No    Drug use: No    Sexual activity: Not Currently     Partners: Male   Lifestyle    Physical activity:     Days per week: Not on file     Minutes per session: Not on file    Stress: Not on file   Relationships    Social connections:     Talks on phone: Not on file     Gets together: Not on file     Attends Shinto service: Not on file     Active member of club or organization: Not on file     Attends meetings of clubs or organizations: Not on file     Relationship status: Not on file    Intimate partner violence:     Fear of current or ex partner: Not on file     Emotionally abused: Not on file     Physically abused: Not on file     Forced sexual activity: Not on file   Other Topics Concern    Not on file   Social History Narrative    Occasional Caffeine Consumption      Family History   Problem Relation Age of Onset    Hypertension Mother     Heart disease Mother         Valvular    Hyperlipidemia Mother     Hypertension Father     Heart defect Father         Cardiomegaly    Stroke Sister         Cerebrovascular Accident    Arthritis Brother     Other Brother         Back Disorder     Past Surgical History:   Procedure Laterality Date    FRACTURE SURGERY      OVARIAN CYST SURGERY      ROTATOR CUFF REPAIR Left     TONSILLECTOMY         Current Outpatient Medications:     amLODIPine (NORVASC) 5 mg tablet, Take 1 tablet (5 mg total) by mouth 2 (two) times a day, Disp: 60 tablet, Rfl: 11    cloNIDine (CATAPRES-TTS-2) 0 2 mg/24 hr, Place 1 patch (0 2 mg total) on the skin once a week, Disp: 12 patch, Rfl: 3    clopidogrel (PLAVIX) 75 mg tablet, Take 1 tablet (75 mg total) by mouth daily, Disp: 90 tablet, Rfl: 1    folic acid (FOLVITE) 274 MCG tablet, Take 800 mcg by mouth daily , Disp: , Rfl:     labetalol (NORMODYNE) 100 mg tablet, TAKE 1 TABLET BY MOUTH TWICE A DAY, Disp: 180 tablet, Rfl: 0    losartan (COZAAR) 100 MG tablet, Take 100 mg by mouth every morning , Disp: , Rfl:     losartan (COZAAR) 50 mg tablet, Take 1 tablet by mouth daily  , Disp: , Rfl:     traMADol (ULTRAM) 50 mg tablet, Take 1 tablet (50 mg total) by mouth every 8 (eight) hours as needed for moderate pain, Disp: 60 tablet, Rfl: 1    Cholecalciferol (VITAMIN D3) 1000 units CAPS, Take 1 capsule by mouth every other day , Disp: , Rfl:     ferrous sulfate 325 (65 Fe) mg tablet, Take 325 mg by mouth daily, Disp: , Rfl:     Omega-3 Fatty Acids (SM FISH OIL) 1200 MG CAPS, Take by mouth 2 (two) times a day, Disp: , Rfl:   Allergies   Allergen Reactions    Atorvastatin Dizziness and Myalgia     Other reaction(s): Dizziness/Myalgia    Colesevelam      Other reaction(s): leg pains    Colestipol      Other reaction(s): Swelling, Itching    Ezetimibe      Other reaction(s): heartburn  Other reaction(s): Heart Burn    Fenofibrate      Other reaction(s): blood in urine  hx  Kidney Failure    Pollen Extract     Rosuvastatin      Other reaction(s): Leg Cramping    Statins Myalgia       Labs:  Transcribe Orders on 11/08/2019   Component Date Value    Case Report 11/08/2019                      Value:Non-gynecologic Cytology                          Case: [id number]                                  Authorizing Provider:  Samantha Wyatt MD        Collected:           11/08/2019 1103              Ordering Location:     [address] [place] [address]      Received:            11/08/2019 1103                                     Laboratory Services Pathologist:           Annabelle Breaux MD                                                   Specimen:    Urine, Other                                                                               Final Diagnosis 11/08/2019                      Value: This result contains rich text formatting which cannot be displayed here  Corbyelyn Wheeler Gross Description 11/08/2019                      Value: This result contains rich text formatting which cannot be displayed here   Additional Information 11/08/2019                      Value: This result contains rich text formatting which cannot be displayed here  Hospital Outpatient Visit on 11/08/2019   Component Date Value    Protocol Name 11/08/2019 LEXISCAN-SIT     Time In Exercise Phase 11/08/2019 00:03:00     MAX   SYSTOLIC BP 97/06/7437 387     Max Diastolic Bp 17/32/3008 84     Max Heart Rate 11/08/2019 84     Max Predicted Heart Rate 11/08/2019 148     Reason for Termination 11/08/2019 Protocol Complete     Test Indication 11/08/2019 Abnormal ECG     Target Hr Formular 11/08/2019 (220 - Age)*85%    Office Visit on 11/04/2019   Component Date Value    Ventricular Rate 11/04/2019 63     Atrial Rate 11/04/2019 63     NJ Interval 11/04/2019 174     QRSD Interval 11/04/2019 84     QT Interval 11/04/2019 402     QTC Interval 11/04/2019 411     P Axis 11/04/2019 64     QRS Axis 11/04/2019 2     T Wave Salley 11/04/2019 102    Appointment on 11/04/2019   Component Date Value    Sodium 11/04/2019 143     Potassium 11/04/2019 4 7     Chloride 11/04/2019 105     CO2 11/04/2019 30     ANION GAP 11/04/2019 8     BUN 11/04/2019 24     Creatinine 11/04/2019 1 56*    Glucose, Fasting 11/04/2019 171*    Calcium 11/04/2019 9 9     eGFR 11/04/2019 33     WBC 11/04/2019 8 32     RBC 11/04/2019 5 17*    Hemoglobin 11/04/2019 14 9     Hematocrit 11/04/2019 46 4*    MCV 11/04/2019 90     MCH 11/04/2019 28 8     MCHC 11/04/2019 32 1     RDW 11/04/2019 13 3     MPV 11/04/2019 10 7     Platelets 39/54/2716 258     nRBC 11/04/2019 0     Neutrophils Relative 11/04/2019 65     Immat GRANS % 11/04/2019 1     Lymphocytes Relative 11/04/2019 22     Monocytes Relative 11/04/2019 8     Eosinophils Relative 11/04/2019 3     Basophils Relative 11/04/2019 1     Neutrophils Absolute 11/04/2019 5 42     Immature Grans Absolute 11/04/2019 0 05     Lymphocytes Absolute 11/04/2019 1 86     Monocytes Absolute 11/04/2019 0 67     Eosinophils Absolute 11/04/2019 0 26     Basophils Absolute 11/04/2019 0 06     Hemoglobin A1C 11/04/2019 5 7     EAG 11/04/2019 117     Urine Culture 11/08/2019 30,000-39,000 cfu/ml      Creatinine, Ur 11/04/2019 144 0     Microalbum  ,U,Random 11/04/2019 1,140 0*    Microalb Creat Ratio 11/04/2019 792*   Procedure visit on 10/31/2019   Component Date Value    LEUKOCYTE ESTERASE,UA 10/31/2019 NEG     NITRITE,UA 10/31/2019 NEG     SL AMB POCT UROBILINOGEN 10/31/2019 NEG     POCT URINE PROTEIN 10/31/2019 +++      PH,UA 10/31/2019 6     BLOOD,UA 10/31/2019 NEG     SPECIFIC GRAVITY,UA 10/31/2019 1 020     KETONES,UA 10/31/2019 Trace     BILIRUBIN,UA 10/31/2019 NEG     GLUCOSE, UA 10/31/2019 NEG      COLOR,UA 10/31/2019 Yellow     CLARITY,UA 10/31/2019 Clear     Clarity, UA 11/08/2019 Slightly Cloudy     Color, UA 11/08/2019 Yellow     Specific Gravity, UA 11/08/2019 1 010     pH, UA 11/08/2019 6 0     Glucose, UA 11/08/2019 Negative     Ketones, UA 11/08/2019 Negative     Blood, UA 11/08/2019 Large*    Protein, UA 11/08/2019 100 (2+)*    Nitrite, UA 11/08/2019 Negative     Bilirubin, UA 11/08/2019 Negative     Urobilinogen, UA 11/08/2019 0 2     Leukocytes, UA 11/08/2019 Negative     WBC, UA 11/08/2019 2-4*    RBC, UA 11/08/2019 30-50*    Bacteria, UA 11/08/2019 Occasional     Epithelial Cells 11/08/2019 Occasional     Color, UA 10/31/2019 Yellow     Clarity, UA 10/31/2019 Clear     Specific [place], UA 10/31/2019 1 020     pH, UA 10/31/2019 6 0     Leukocytes, UA 10/31/2019 Negative     Nitrite, UA 10/31/2019 Negative     Protein, UA 10/31/2019 100 (2+)*    Glucose, UA 10/31/2019 Negative     Ketones, UA 10/31/2019 Negative     Urobilinogen, UA 10/31/2019 0 2     Bilirubin, UA 10/31/2019 Negative     Blood, UA 10/31/2019 Moderate*    Case Report 10/31/2019                      Value:Non-gynecologic Cytology                          Case: [id number]                                  Authorizing Provider:  Corwin Kumar MD        Collected:           10/31/2019 1440              Ordering Location:     [facility]        Received:            10/31/2019 1440                                     [facility] [place]                                                         Pathologist:           Latesha Gill MD                                                                 Specimen:    Urine, Clean Catch                                                                         Final Diagnosis 10/31/2019                      Value: This result contains rich text formatting which cannot be displayed here  Nathan Claros Gross Description 10/31/2019                      Value: This result contains rich text formatting which cannot be displayed here   Additional Information 10/31/2019                      Value: This result contains rich text formatting which cannot be displayed here      RBC, UA 10/31/2019 None Seen     WBC, UA 10/31/2019 None Seen     Epithelial Cells 10/31/2019 None Seen     Bacteria, UA 10/31/2019 Occasional     Hyaline Casts, UA 10/31/2019 None Seen    Orders Only on 10/25/2019   Component Date Value    Specific Gravity 10/25/2019 1 013     Ph 10/25/2019 6 0     Color UA 10/25/2019 Yellow     Urine Appearance 10/25/2019 Clear     Leukocyte Esterase 10/25/2019 Negative     Protein 10/25/2019 2+*    Glucose, 24 HR Urine 10/25/2019 Negative     Ketone, Urine 10/25/2019 Negative  Blood, Urine 10/25/2019 1+*    Bilirubin, Urine 10/25/2019 Negative     Urobilinogen Urine 10/25/2019 0 2     SL AMB NITRITES URINE, Q* 10/25/2019 Negative     Microscopic Examination 10/25/2019 See below:     Urinalysis Reflex 10/25/2019 Comment     SL AMB WBC, URINE 10/25/2019 0-5     RBC, Urine 10/25/2019 0-2     Epithelial Cells (non re* 10/25/2019 0-10     Bacteria, Urine 10/25/2019 None seen      Imaging: Ct Lung Screening Program    Result Date: 11/1/2019  Narrative: CT CHEST LUNG CANCER SCREENING WITHOUT IV CONTRAST INDICATION:   Z12 2: Encounter for screening for malignant neoplasm of respiratory organs  COMPARISON: None  TECHNIQUE:  Unenhanced CT examination of the chest was performed utilizing a low dose protocol  Reformatted images were created in axial, sagittal, and coronal planes  Radiation dose length product (DLP) for this visit:  129 mGy-cm   This examination, like all CT scans performed in the [facility], was performed utilizing techniques to minimize radiation dose exposure, including the use of iterative reconstruction and automated exposure control  FINDINGS: LUNGS:  There are scattered tree-in-bud opacities noted throughout both upper and lower lobes suspicious for an infectious or inflammatory bronchiolitis  This limits evaluation for solitary pulmonary nodules  There is no focal infiltrate or consolidation  No pneumothorax or pleural effusion  There is no tracheal or endobronchial lesion  PLEURA:  Unremarkable  HEART/GREAT VESSELS:  Atherosclerotic changes are noted in thoracic aorta and coronary arteries  MEDIASTINUM AND JERMAIN:  Unremarkable  CHEST WALL AND LOWER NECK:   Unremarkable  VISUALIZED STRUCTURES IN THE UPPER ABDOMEN:  The partially visualized left kidney appears moderately atrophic  There is atherosclerotic calcification of the abdominal aorta and branches  OSSEOUS STRUCTURES:  No acute fracture or destructive osseous lesion       Impression: Diffuse upper and lower lobe scattered tree-in-bud opacities suspicious for an infectious or inflammatory bronchiolitis  This limits evaluation for solitary pulmonary nodules  Clinical correlation is recommended  A repeat CT chest in 6-8 weeks is recommended to assess for improvement/resolution and reassess for solitary pulmonary nodules  Lung-RADS:  Lung-RADS(s), clinically significant or potentially clinically significant findings (non lung cancer)  Workstation performed: [id number]     [facility] Kidney And Bladder    Result Date: 10/29/2019  Narrative: RENAL ULTRASOUND INDICATION:   R31 0: Gross hematuria  COMPARISON: None TECHNIQUE:   Ultrasound of the retroperitoneum was performed with a curvilinear transducer utilizing volumetric sweeps and still imaging techniques  FINDINGS: KIDNEYS: Symmetric and normal size  Right kidney:  10 5 cm  Left kidney:  6 5 cm  Right kidney Normal echogenicity and contour  No suspicious masses detected  No hydronephrosis  No shadowing calculi  No perinephric fluid collections  Left kidney The left kidney is echogenic and atrophic probably related to medical renal disease    No suspicious masses detected  No hydronephrosis  No shadowing calculi  No perinephric fluid collections  URETERS: Nonvisualized  BLADDER: Normally distended  There is focal thickening of the floor the bladder with a suggestion of a nodular appearance measuring up to 1 1 cm in size  Only the left ureteral jet is visualized  Impression: Nodular focus in the floor the bladder  Although inflammation is possible  A small neoplasm is also a possibility given the history  Atrophic left kidney  Cystoscopic correlation is advised   This was discussed with Dr Sukhjinder Page on 10/29/2019 Workstation performed: [id number]       Review of Systems:  Review of Systems   REVIEW OF SYSTEMS:  Constitutional:  Denies fever or chills   Eyes:  Denies change in visual acuity   HENT:  Denies nasal congestion or sore throat   Respiratory: shortness of breath   Cardiovascular:  Denies chest pain or edema   GI:  Denies abdominal pain, nausea, vomiting, bloody stools or diarrhea   :  Denies dysuria, frequency, difficulty in micturition and nocturia  Musculoskeletal:  Denies back pain or joint pain   Neurologic:  Denies headache, focal weakness or sensory changes   Endocrine:  Denies polyuria or polydipsia   Lymphatic:  Denies swollen glands   Psychiatric:  Denies depression or anxiety     Physical Exam:    /80   Pulse 66   Ht 4' 10" (1 473 m)   Wt 69 9 kg (154 lb)   LMP  (LMP Unknown)   SpO2 98%   BMI 32 19 kg/m²     Physical Exam   PHYSICAL EXAM:  General:  Patient is not in acute distress   Head: Normocephalic, Atraumatic  HEENT:  Both pupils normal-size atraumatic, normocephalic, nonicteric  Neck:  JVP not raised  Trachea central  No carotid bruit  Respiratory:  Decreased breath sounds bilateral  Cardiovascular:  Regular rate and rhythm no S3 no murmurs  GI:  Abdomen soft nontender  No organomegaly  Lymphatic:  No cervical or inguinal lymphadenopathy  Neurologic:  Patient is awake alert, oriented   Grossly nonfocal  Extremities trace edema    Discussion/Summary: This patient has multiple risk factors for coronary artery disease including hypertension, hyperlipidemia, smoking, family history of coronary artery disease as well as peripheral vascular disease  Patient had a pharmacological nuclear stress test as a part of preoperative risk assessment for bladder surgery  Patient did have reversible defects which were small  However patient has multiple risk factors for coronary artery disease  Lengthy discussion with patient and family regarding the results of the stress test as well as multiple significant risk factors for coronary artery disease which are impressive  Options of cardiac catheterization discussed with patient and family      Risks and benefits of cardiac catheterization and possible revascularization options including but not limited to risk of infection, bleeding, risk of myocardial infarction, stroke, and risk of death discussed at length with patient  Patient understands the risks and benefits and wishes to proceed  Cardiac catheterization will be scheduled   Preprocedure workup will be done  Further cardiac evaluation and management after the results of cardiac catheterization  Patient has been instructed to hold losartan the day of the procedure  Patient also will see her nephrologist to get further recommendations regarding her renal risk for cardiac catheterization  Patient will need IV hydration as per [facility] protocol  Patient is strongly counseled to quit smoking to prevent future cardiovascular events  If patient does not have any significant coronary artery disease like left main or multivessel disease, patient could be considered for her bladder surgery soon  Will wait for the results of cardiac catheterization  Patient and family had a lot of questions which were answered  Follow-up with primary care physician

## 2019-12-03 ENCOUNTER — TELEPHONE (OUTPATIENT)
Dept: CARDIOLOGY CLINIC | Facility: CLINIC | Age: 74
End: 2019-12-03

## 2019-12-03 NOTE — TELEPHONE ENCOUNTER
YAZ s/gregg pt in regards to procedure  Pt will be seeing her Nephrologist on 12/19/19 and will give us a call with her decision

## 2019-12-03 NOTE — TELEPHONE ENCOUNTER
----- Message from Mechelle De Souza MD sent at 11/26/2019  4:39 PM EST -----  [facility]  Please schedule this patient for cardiac catheterization at [facility], THE middle of December 2019  Diagnosis shortness of breath and abnormal stress test  Patient does have CKD and is going to see her nephrologist in the next 1 to 2 weeks to get his opinion  Patient to stop the losartan the day of the procedure  Patient getting blood work in the next few days  Patient will need hydration as per [facility] protocol

## 2019-12-04 ENCOUNTER — TELEPHONE (OUTPATIENT)
Dept: INTERNAL MEDICINE CLINIC | Facility: CLINIC | Age: 74
End: 2019-12-04

## 2019-12-04 ENCOUNTER — HOSPITAL ENCOUNTER (OUTPATIENT)
Dept: PULMONOLOGY | Facility: HOSPITAL | Age: 74
Discharge: HOME | End: 2019-12-04
Attending: INTERNAL MEDICINE
Payer: COMMERCIAL

## 2019-12-04 DIAGNOSIS — F17.200 TOBACCO USE DISORDER: ICD-10-CM

## 2019-12-04 PROCEDURE — 94060 EVALUATION OF WHEEZING: CPT

## 2019-12-04 PROCEDURE — 94729 DIFFUSING CAPACITY: CPT

## 2019-12-04 PROCEDURE — 94060 EVALUATION OF WHEEZING: CPT | Performed by: INTERNAL MEDICINE

## 2019-12-04 PROCEDURE — 94727 GAS DIL/WSHOT DETER LNG VOL: CPT | Performed by: INTERNAL MEDICINE

## 2019-12-04 PROCEDURE — 94729 DIFFUSING CAPACITY: CPT | Performed by: INTERNAL MEDICINE

## 2019-12-04 PROCEDURE — 94760 N-INVAS EAR/PLS OXIMETRY 1: CPT

## 2019-12-04 PROCEDURE — 94727 GAS DIL/WSHOT DETER LNG VOL: CPT

## 2019-12-04 RX ORDER — ALBUTEROL SULFATE 2.5 MG/3ML
2.5 SOLUTION RESPIRATORY (INHALATION) ONCE
Status: COMPLETED | OUTPATIENT
Start: 2019-12-04 | End: 2019-12-04

## 2019-12-04 RX ADMIN — ALBUTEROL SULFATE 2.5 MG: 2.5 SOLUTION RESPIRATORY (INHALATION) at 08:51

## 2019-12-04 NOTE — TELEPHONE ENCOUNTER
----- Message from Berto Luther DO sent at 12/3/2019 11:44 AM EST -----  Please call patient negative cologuard

## 2019-12-31 NOTE — TELEPHONE ENCOUNTER
Pt called back, stated that she can not see her nephrologist until 1/30/20 but she is leaning towards not having the procedure   INÉS

## 2020-01-01 NOTE — TELEPHONE ENCOUNTER
Please check with patient  She needs to see Nephrology before the procedure  If he wants to see any of the nephrologists in [facility], we can try to get an appointment

## 2020-01-02 NOTE — TELEPHONE ENCOUNTER
Hi Dr Agus Grace, when I spoke with the pt she stated that she was leaning towards not having the procedure whether she see's nephrology or not  I will make note to check with her again after her nephrology appt to see if she finalized her decision

## 2020-01-06 DIAGNOSIS — M10.272 ACUTE DRUG-INDUCED GOUT OF LEFT FOOT: ICD-10-CM

## 2020-01-06 RX ORDER — LABETALOL 100 MG/1
TABLET, FILM COATED ORAL
Qty: 180 TABLET | Refills: 0 | Status: SHIPPED | OUTPATIENT
Start: 2020-01-06 | End: 2020-01-22 | Stop reason: SDUPTHER

## 2020-01-07 ENCOUNTER — HOSPITAL ENCOUNTER (OUTPATIENT)
Dept: NON INVASIVE DIAGNOSTICS | Facility: CLINIC | Age: 75
Discharge: HOME | End: 2020-01-07
Payer: COMMERCIAL

## 2020-01-07 ENCOUNTER — TELEPHONE (OUTPATIENT)
Dept: INTERNAL MEDICINE CLINIC | Facility: CLINIC | Age: 75
End: 2020-01-07

## 2020-01-07 DIAGNOSIS — I70.1 RENAL ARTERY STENOSIS (HCC): ICD-10-CM

## 2020-01-07 DIAGNOSIS — I73.9 CLAUDICATION IN PERIPHERAL VASCULAR DISEASE (HCC): ICD-10-CM

## 2020-01-07 DIAGNOSIS — I70.0 AORTOILIAC STENOSIS, LEFT (HCC): ICD-10-CM

## 2020-01-07 DIAGNOSIS — R09.89 DECREASED PULSES IN FEET: ICD-10-CM

## 2020-01-07 PROCEDURE — 93925 LOWER EXTREMITY STUDY: CPT

## 2020-01-07 PROCEDURE — 93975 VASCULAR STUDY: CPT | Performed by: SURGERY

## 2020-01-07 PROCEDURE — 93922 UPR/L XTREMITY ART 2 LEVELS: CPT | Performed by: SURGERY

## 2020-01-07 PROCEDURE — 93975 VASCULAR STUDY: CPT

## 2020-01-07 PROCEDURE — 93925 LOWER EXTREMITY STUDY: CPT | Performed by: SURGERY

## 2020-01-07 PROCEDURE — 93923 UPR/LXTR ART STDY 3+ LVLS: CPT

## 2020-01-07 NOTE — TELEPHONE ENCOUNTER
Spoke with Vascular center they said patient didn't take the medicine, patient advised to take medicine and after an hour give our office a call   I also encourage ask patient to stop by our office to recheck BW

## 2020-01-14 ENCOUNTER — CONSULT (OUTPATIENT)
Dept: PULMONOLOGY | Facility: CLINIC | Age: 75
End: 2020-01-14
Payer: COMMERCIAL

## 2020-01-14 ENCOUNTER — TELEPHONE (OUTPATIENT)
Dept: INTERNAL MEDICINE CLINIC | Facility: CLINIC | Age: 75
End: 2020-01-14

## 2020-01-14 VITALS
DIASTOLIC BLOOD PRESSURE: 90 MMHG | HEART RATE: 62 BPM | OXYGEN SATURATION: 98 % | BODY MASS INDEX: 34.43 KG/M2 | HEIGHT: 58 IN | SYSTOLIC BLOOD PRESSURE: 158 MMHG | WEIGHT: 164 LBS

## 2020-01-14 DIAGNOSIS — I77.1 CELIAC ARTERY STENOSIS (HCC): ICD-10-CM

## 2020-01-14 DIAGNOSIS — F17.200 TOBACCO USE DISORDER: ICD-10-CM

## 2020-01-14 DIAGNOSIS — I73.9 CLAUDICATION IN PERIPHERAL VASCULAR DISEASE (HCC): ICD-10-CM

## 2020-01-14 DIAGNOSIS — R06.02 SOB (SHORTNESS OF BREATH): Primary | ICD-10-CM

## 2020-01-14 DIAGNOSIS — I70.201 FEMORAL ARTERY STENOSIS, RIGHT (HCC): ICD-10-CM

## 2020-01-14 DIAGNOSIS — I70.1 ATHEROSCLEROSIS OF RENAL ARTERY (HCC): ICD-10-CM

## 2020-01-14 DIAGNOSIS — R93.89 ABNORMAL CT OF THE CHEST: ICD-10-CM

## 2020-01-14 DIAGNOSIS — K55.1 SUPERIOR MESENTERIC ARTERY STENOSIS (HCC): Primary | ICD-10-CM

## 2020-01-14 DIAGNOSIS — R09.81 SINUS CONGESTION: ICD-10-CM

## 2020-01-14 PROCEDURE — 99204 OFFICE O/P NEW MOD 45 MIN: CPT | Performed by: INTERNAL MEDICINE

## 2020-01-14 NOTE — PROGRESS NOTES
Staff was informed to Please call patient she does has significant vascular disease to see Dr Andie Reid, if she develops any leg pain at rest to go to the emergency room immediately

## 2020-01-14 NOTE — PROGRESS NOTES
Assessment/Plan:     Diagnoses and all orders for this visit:    SOB (shortness of breath)  -     [facility] Allergy Panel, Adult; Future  -     Hypersensitivity pnuemonitis profile; Future    Abnormal CT of the chest  -     CT chest without contrast; Future    Tobacco use disorder    Sinus congestion  -     XR sinuses routine 3+ views; Future        Recent PFTs with no evidence of any obstructive lung disease FEV1 has been completely normal along with the DLCO also has been normal   Reviewed CT of the chest report and images with the patient  With diffuse tree-in-bud appearance is, likely infectious versus inflammatory etiology currently not having any fevers no evidence of any leukocytosis, likely all inflammation  Will also get respiratory allergy panel with IgE and hypersensitivity pneumonitis profile  Will repeat CT of the chest and follow-up  If tree-in-bud appearance still present, would need bronchoscopy and BAL cultures  Also given extensive sinus congestion for a long time, will order x-ray of the sinuses and follow-up  Long discussion with the patient with regards to smoking cessation does not want to quit currently  She has tried several times in the past and was not successful she states  Vaccinations up-to-date  Follow-up after the about testing  Return in about 4 weeks (around 2/11/2020)  All questions are answered to the patient's satisfaction and understanding  She verbalizes understanding  She is encouraged to call with any further questions or concerns  Portions of the record may have been created with voice recognition software  Occasional wrong word or "sound a like" substitutions may have occurred due to the inherent limitations of voice recognition software  Read the chart carefully and recognize, using context, where substitutions have occurred  a    Electronically Signed by Syl Lopez MD    ______________________________________________________________________    Chief Complaint:   Chief Complaint   Patient presents with    Abnormal Imaging Result        Patient ID: [facility] is a 69 y o  y o  female has a past medical history of Arthritis, Coronary artery disease, Diabetes mellitus ([address] ), Endometriosis, High cholesterol, Hypertension, Kidney disease, chronic, stage III (moderate, EGFR 30-59 ml/min) (HCC), Lumbar disc herniation, Peripheral vascular disease ([address] ), Shoulder injury, Spinal stenosis, and Stroke ([facility] [address] )  1/14/2020  Patient presents today for initial visit  Patient is a very pleasant 60-year-old lady, with extensive smoking history with greater than 60 pack year smoking history still smoking a pack a day, does not want to quit, she states she has tried several times in the past to quit and was not successful she states  She had a CT of the chest for lung cancer screening done in October of 2019 which demonstrated diffuse tree-in-bud appearance is bilaterally, for which she has been referred  She does complain of some cough mainly dry nonproductive, occasionally white mucoid sputum no hemoptysis  She does have some sinus congestion with the postnasal drip on a constant basis she states  Here for follow-up of the abnormal CT of the chest       Occupational/Exposure history:  Pets/Enviroment:dogs and cats  Travel history:  Review of Systems   Constitutional: Positive for fatigue  Negative for appetite change, chills, diaphoresis, fever and unexpected weight change  HENT: Positive for postnasal drip and sinus pressure  Negative for congestion, ear discharge, ear pain, nosebleeds, rhinorrhea, sinus pain, sore throat and voice change  Eyes: Negative for pain, discharge and visual disturbance  Respiratory: Positive for cough (Mild nonproductive cough) and shortness of breath ( shortness of breath and dyspnea on exertion especially on going up a flight of stairs not much limitation of her daily current activities she states  )   Negative for apnea, choking, chest tightness, wheezing and stridor  Cardiovascular: Negative for chest pain, palpitations and leg swelling  Gastrointestinal: Negative for abdominal pain, blood in stool, constipation, diarrhea and vomiting  Endocrine: Negative for cold intolerance, heat intolerance, polydipsia, polyphagia and polyuria  Genitourinary: Negative for difficulty urinating and dysuria  Musculoskeletal: Negative for arthralgias and neck pain  Skin: Negative for pallor and rash  Allergic/Immunologic: Negative for environmental allergies and food allergies  Neurological: Negative for dizziness, speech difficulty, weakness and light-headedness  Hematological: Negative for adenopathy  Does not bruise/bleed easily  Psychiatric/Behavioral: Negative for agitation, confusion and sleep disturbance  The patient is not nervous/anxious  Social history: She reports that she has been smoking cigarettes  She has been smoking about 1 00 pack per day  She has never used smokeless tobacco  She reports that she does not drink alcohol or use drugs      Past surgical history:   Past Surgical History:   Procedure Laterality Date    FRACTURE SURGERY      OVARIAN CYST SURGERY      ROTATOR CUFF REPAIR Left     TONSILLECTOMY       Family history:   Family History   Problem Relation Age of Onset    Hypertension Mother     Heart disease Mother         Valvular    Hyperlipidemia Mother     Hypertension Father     Heart defect Father         Cardiomegaly    Stroke Sister         Cerebrovascular Accident    Arthritis Brother     Other Brother         Back Disorder       Immunization History   Administered Date(s) Administered    Pneumococcal Conjugate 13-Valent 09/21/2016    Pneumococcal Polysaccharide PPV23 12/14/2011     Current Outpatient Medications   Medication Sig Dispense Refill    amLODIPine (NORVASC) 5 mg tablet Take 1 tablet (5 mg total) by mouth 2 (two) times a day 60 tablet 11    Cholecalciferol (VITAMIN D3) 1000 units CAPS Take 1 capsule by mouth every other day       cloNIDine (CATAPRES-TTS-2) 0 2 mg/24 hr Place 1 patch (0 2 mg total) on the skin once a week 12 patch 3    clopidogrel (PLAVIX) 75 mg tablet Take 1 tablet (75 mg total) by mouth daily 90 tablet 1    ferrous sulfate 325 (65 Fe) mg tablet Take 325 mg by mouth daily      folic acid (FOLVITE) 784 MCG tablet Take 800 mcg by mouth daily       labetalol (NORMODYNE) 100 mg tablet TAKE 1 TABLET BY MOUTH TWICE A  tablet 0    losartan (COZAAR) 100 MG tablet Take 100 mg by mouth every morning       losartan (COZAAR) 50 mg tablet Take 1 tablet by mouth daily        Omega-3 Fatty Acids (SM FISH OIL) 1200 MG CAPS Take by mouth 2 (two) times a day      traMADol (ULTRAM) 50 mg tablet Take 1 tablet (50 mg total) by mouth every 8 (eight) hours as needed for moderate pain 60 tablet 1     No current facility-administered medications for this visit  Allergies: Atorvastatin; Colesevelam; Colestipol; Ezetimibe; Fenofibrate; Pollen extract; Rosuvastatin; and Statins    Objective:  Vitals:    01/14/20 1011   BP: 158/90   Pulse: 62   SpO2: 98%   Weight: 74 4 kg (164 lb)   Height: 4' 10" (1 473 m)   Oxygen Therapy  SpO2: 98 %    Wt Readings from Last 3 Encounters:   01/14/20 74 4 kg (164 lb)   11/26/19 69 9 kg (154 lb)   11/07/19 73 9 kg (163 lb)     Body mass index is 34 28 kg/m²  Physical Exam   Constitutional: She is oriented to person, place, and time  She appears well-developed and well-nourished  HENT:   Head: Normocephalic and atraumatic  Eyes: Pupils are equal, round, and reactive to light  Conjunctivae are normal    Neck: Normal range of motion  Neck supple  No JVD present  No thyromegaly present  Cardiovascular: Normal rate, regular rhythm and normal heart sounds  Exam reveals no gallop and no friction rub  No murmur heard  Pulmonary/Chest: Effort normal and breath sounds normal  No respiratory distress  She has no wheezes  She has no rales  She exhibits no tenderness  Abdominal: Soft  Bowel sounds are normal    Musculoskeletal: Normal range of motion  She exhibits no edema, tenderness or deformity  Lymphadenopathy:     She has no cervical adenopathy  Neurological: She is alert and oriented to person, place, and time  Skin: Skin is warm and dry  Psychiatric: She has a normal mood and affect  Nursing note and vitals reviewed  Diagnostics:  I have personally reviewed pertinent reports

## 2020-01-14 NOTE — TELEPHONE ENCOUNTER
----- Message from Gwen Hunt DO sent at 1/14/2020 12:38 PM EST -----  Please call patient she does has significant vascular disease to see Dr Zain Thomas, if she develops any leg pain at rest to go to the emergency room immediately

## 2020-01-17 LAB
25(OH)D3+25(OH)D2 SERPL-MCNC: 30.9 NG/ML (ref 30–100)
ALBUMIN SERPL-MCNC: 4.5 G/DL (ref 3.5–4.8)
ALBUMIN/CREAT UR: 943.7 MG/G CREAT (ref 0–30)
ALBUMIN/GLOB SERPL: 1.8 {RATIO} (ref 1.2–2.2)
ALP SERPL-CCNC: 103 IU/L (ref 39–117)
ALT SERPL-CCNC: 16 IU/L (ref 0–32)
APPEARANCE UR: CLEAR
AST SERPL-CCNC: 16 IU/L (ref 0–40)
BACTERIA UR CULT: NORMAL
BACTERIA URNS QL MICRO: ABNORMAL
BASOPHILS # BLD AUTO: 0 X10E3/UL (ref 0–0.2)
BASOPHILS NFR BLD AUTO: 1 %
BILIRUB SERPL-MCNC: 0.3 MG/DL (ref 0–1.2)
BILIRUB UR QL STRIP: NEGATIVE
BUN SERPL-MCNC: 23 MG/DL (ref 8–27)
BUN/CREAT SERPL: 15 (ref 12–28)
CALCIUM SERPL-MCNC: 9.7 MG/DL (ref 8.7–10.3)
CHLORIDE SERPL-SCNC: 100 MMOL/L (ref 96–106)
CO2 SERPL-SCNC: 21 MMOL/L (ref 20–29)
COLOR UR: ABNORMAL
CREAT SERPL-MCNC: 1.55 MG/DL (ref 0.57–1)
CREAT UR-MCNC: 55.4 MG/DL
EOSINOPHIL # BLD AUTO: 0.3 X10E3/UL (ref 0–0.4)
EOSINOPHIL NFR BLD AUTO: 3 %
EPI CELLS #/AREA URNS HPF: ABNORMAL /HPF (ref 0–10)
ERYTHROCYTE [DISTWIDTH] IN BLOOD BY AUTOMATED COUNT: 14.1 % (ref 11.7–15.4)
GLOBULIN SER-MCNC: 2.5 G/DL (ref 1.5–4.5)
GLUCOSE SERPL-MCNC: 117 MG/DL (ref 65–99)
GLUCOSE UR QL: NEGATIVE
HBA1C MFR BLD: 5.9 % (ref 4.8–5.6)
HCT VFR BLD AUTO: 43.5 % (ref 34–46.6)
HGB BLD-MCNC: 15.2 G/DL (ref 11.1–15.9)
HGB UR QL STRIP: ABNORMAL
IMM GRANULOCYTES # BLD: 0 X10E3/UL (ref 0–0.1)
IMM GRANULOCYTES NFR BLD: 1 %
KETONES UR QL STRIP: NEGATIVE
LEUKOCYTE ESTERASE UR QL STRIP: ABNORMAL
LYMPHOCYTES # BLD AUTO: 1.9 X10E3/UL (ref 0.7–3.1)
LYMPHOCYTES NFR BLD AUTO: 24 %
Lab: NO GROWTH
MAGNESIUM SERPL-MCNC: 1.9 MG/DL (ref 1.6–2.3)
MCH RBC QN AUTO: 30 PG (ref 26.6–33)
MCHC RBC AUTO-ENTMCNC: 34.9 G/DL (ref 31.5–35.7)
MCV RBC AUTO: 86 FL (ref 79–97)
MICRO URNS: ABNORMAL
MICROALBUMIN UR-MCNC: 522.8 UG/ML
MONOCYTES # BLD AUTO: 0.7 X10E3/UL (ref 0.1–0.9)
MONOCYTES NFR BLD AUTO: 9 %
NEUTROPHILS # BLD AUTO: 5.1 X10E3/UL (ref 1.4–7)
NEUTROPHILS NFR BLD AUTO: 62 %
NITRITE UR QL STRIP: NEGATIVE
PH UR STRIP: 6.5 [PH] (ref 5–7.5)
PLATELET # BLD AUTO: 261 X10E3/UL (ref 150–450)
POTASSIUM SERPL-SCNC: 4.3 MMOL/L (ref 3.5–5.2)
PROT SERPL-MCNC: 7 G/DL (ref 6–8.5)
PROT UR QL STRIP: ABNORMAL
RBC # BLD AUTO: 5.06 X10E6/UL (ref 3.77–5.28)
RBC #/AREA URNS HPF: >30 /HPF (ref 0–2)
SL AMB EGFR AFRICAN AMERICAN: 38 ML/MIN/1.73
SL AMB EGFR NON AFRICAN AMERICAN: 33 ML/MIN/1.73
SL AMB URINALYSIS REFLEX: ABNORMAL
SODIUM SERPL-SCNC: 139 MMOL/L (ref 134–144)
SP GR UR: 1.01 (ref 1–1.03)
TSH SERPL DL<=0.005 MIU/L-ACNC: 2.88 UIU/ML (ref 0.45–4.5)
URATE SERPL-MCNC: 7.2 MG/DL (ref 2.5–7.1)
UROBILINOGEN UR STRIP-ACNC: 0.2 MG/DL (ref 0.2–1)
WBC # BLD AUTO: 8.1 X10E3/UL (ref 3.4–10.8)
WBC #/AREA URNS HPF: ABNORMAL /HPF (ref 0–5)

## 2020-01-21 LAB
A ALTERNATA IGE QN: <0.1 KU/L
A FUMIGATUS IGE QN: <0.1 KU/L
A FUMIGATUS1 AB SER QL ID: NEGATIVE
A FUMIGATUS2 AB SER QL ID: NEGATIVE
A FUMIGATUS3 AB SER QL ID: NEGATIVE
A FUMIGATUS6 AB SER QL ID: NEGATIVE
A PULLULANS AB SER QL: NEGATIVE
AMER ROACH IGE QN: <0.1 KU/L
BAHIA GRASS IGE QN: <0.1 KU/L
BERMUDA GRASS IGE QN: <0.1 KU/L
BOXELDER IGE QN: <0.1 KU/L
C HERBARUM IGE QN: <0.1 KU/L
CAT DANDER IGE QN: <0.1 KU/L
CMN PIGWEED IGE QN: <0.1 KU/L
COMMON RAGWEED IGE QN: <0.1 KU/L
D FARINAE IGE QN: <0.1 KU/L
D PTERONYSS IGE QN: <0.1 KU/L
DOG DANDER IGE QN: <0.1 KU/L
ENGL PLANTAIN IGE QN: <0.1 KU/L
JOHNSON GRASS IGE QN: <0.1 KU/L
KENT BLUE GRASS IGE QN: <0.1 KU/L
Lab: NORMAL
M RACEMOSUS IGE QN: <0.1 KU/L
MT JUNIPER IGE QN: <0.1 KU/L
NETTLE IGE QN: <0.1 KU/L
PENICILLIUM CHRYSOGENUM: <0.1 KU/L
PIGEON SERUM AB QL ID: NEGATIVE
S RECTIVIRGULA AB SER QL ID: NEGATIVE
S VIRIDIS AB SER-ACNC: NEGATIVE
SILVER BIRCH IGE QN: <0.1 KU/L
STEMPHYLIUM HERBARUM: <0.1 KU/L
T CANDIDUS AB SER QL: NEGATIVE
T VULGARIS AB SER QL ID: NEGATIVE
WHITE ELM IGE QN: <0.1 KU/L
WHITE HICKORY IGE QN: <0.1 KU/L
WHITE MULBERRY IGE QN: <0.1 KU/L
WHITE OAK IGE QN: <0.1 KU/L

## 2020-01-22 ENCOUNTER — OFFICE VISIT (OUTPATIENT)
Dept: INTERNAL MEDICINE CLINIC | Facility: CLINIC | Age: 75
End: 2020-01-22
Payer: COMMERCIAL

## 2020-01-22 VITALS
TEMPERATURE: 97.3 F | WEIGHT: 165 LBS | RESPIRATION RATE: 16 BRPM | SYSTOLIC BLOOD PRESSURE: 180 MMHG | OXYGEN SATURATION: 98 % | HEART RATE: 69 BPM | BODY MASS INDEX: 34.63 KG/M2 | HEIGHT: 58 IN | DIASTOLIC BLOOD PRESSURE: 95 MMHG

## 2020-01-22 DIAGNOSIS — F17.200 TOBACCO USE DISORDER: ICD-10-CM

## 2020-01-22 DIAGNOSIS — E78.00 HYPERCHOLESTEREMIA: ICD-10-CM

## 2020-01-22 DIAGNOSIS — I73.9 CLAUDICATION IN PERIPHERAL VASCULAR DISEASE (HCC): ICD-10-CM

## 2020-01-22 DIAGNOSIS — I63.9 CEREBROVASCULAR ACCIDENT (CVA), UNSPECIFIED MECHANISM (HCC): Primary | ICD-10-CM

## 2020-01-22 DIAGNOSIS — R09.89 DECREASED PULSES IN FEET: ICD-10-CM

## 2020-01-22 DIAGNOSIS — I15.2 HYPERTENSION ASSOCIATED WITH DIABETES (HCC): ICD-10-CM

## 2020-01-22 DIAGNOSIS — I10 HYPERTENSION, UNSPECIFIED TYPE: ICD-10-CM

## 2020-01-22 DIAGNOSIS — D49.4 BLADDER TUMOR: ICD-10-CM

## 2020-01-22 DIAGNOSIS — Z11.59 ENCOUNTER FOR HEPATITIS C SCREENING TEST FOR LOW RISK PATIENT: ICD-10-CM

## 2020-01-22 DIAGNOSIS — Z78.9 STATIN INTOLERANCE: ICD-10-CM

## 2020-01-22 DIAGNOSIS — I70.1 RIGHT RENAL ARTERY STENOSIS (HCC): ICD-10-CM

## 2020-01-22 DIAGNOSIS — N18.30 KIDNEY DISEASE, CHRONIC, STAGE III (MODERATE, EGFR 30-59 ML/MIN) (HCC): ICD-10-CM

## 2020-01-22 DIAGNOSIS — E55.9 VITAMIN D DEFICIENCY: ICD-10-CM

## 2020-01-22 DIAGNOSIS — E11.21 TYPE 2 DIABETES MELLITUS WITH DIABETIC NEPHROPATHY, WITHOUT LONG-TERM CURRENT USE OF INSULIN (HCC): ICD-10-CM

## 2020-01-22 DIAGNOSIS — I70.0 AORTOILIAC STENOSIS, LEFT (HCC): ICD-10-CM

## 2020-01-22 DIAGNOSIS — M10.272 ACUTE DRUG-INDUCED GOUT OF LEFT FOOT: ICD-10-CM

## 2020-01-22 DIAGNOSIS — E11.59 HYPERTENSION ASSOCIATED WITH DIABETES (HCC): ICD-10-CM

## 2020-01-22 DIAGNOSIS — R09.89 ABDOMINAL BRUIT: ICD-10-CM

## 2020-01-22 DIAGNOSIS — I70.1 ATHEROSCLEROSIS OF RENAL ARTERY (HCC): ICD-10-CM

## 2020-01-22 PROBLEM — Z28.21 IMMUNIZATION NOT CARRIED OUT BECAUSE OF PATIENT REFUSAL: Status: ACTIVE | Noted: 2019-10-22

## 2020-01-22 PROBLEM — I77.4 MEDIAN ARCUATE LIGAMENT SYNDROME (HCC): Status: ACTIVE | Noted: 2019-11-05

## 2020-01-22 PROCEDURE — 3008F BODY MASS INDEX DOCD: CPT | Performed by: INTERNAL MEDICINE

## 2020-01-22 PROCEDURE — 3066F NEPHROPATHY DOC TX: CPT | Performed by: INTERNAL MEDICINE

## 2020-01-22 PROCEDURE — 1160F RVW MEDS BY RX/DR IN RCRD: CPT | Performed by: INTERNAL MEDICINE

## 2020-01-22 PROCEDURE — 99215 OFFICE O/P EST HI 40 MIN: CPT | Performed by: INTERNAL MEDICINE

## 2020-01-22 RX ORDER — LABETALOL 200 MG/1
200 TABLET, FILM COATED ORAL 2 TIMES DAILY
Qty: 180 TABLET | Refills: 2 | Status: SHIPPED | OUTPATIENT
Start: 2020-01-22 | End: 2020-07-01 | Stop reason: SDUPTHER

## 2020-01-22 RX ORDER — ICOSAPENT ETHYL 1000 MG/1
CAPSULE ORAL
Qty: 360 CAPSULE | Refills: 2 | Status: SHIPPED | OUTPATIENT
Start: 2020-01-22 | End: 2020-10-27

## 2020-01-22 NOTE — LETTER
January 23, 2020     Rudy Leary MD  [address] [address]    Patient: Pascual Macdonald   YOB: 1945   Date of Visit: 1/22/2020       Dear Dr Travis Moy: Thank you for referring Rajinder Man to me for evaluation  Below are my notes for this consultation  If you have questions, please do not hesitate to call me  I look forward to following your patient along with you  Sincerely,        Nae Duarte DO        CC: No Recipients  Nae Duarte DO  1/23/2020  6:00 PM  Sign at close encounter  Assessment/Plan:  lupe  1 patient with abnormal stress test with high likelihood of significant coronary disease in light of her multiple risk factors cardiac catheterization is recommended patient has concerns of breath or chronic kidney disease which is mild to moderate did discuss with Dr Fela Houston highly recommends that she has cardiac catheterization so she can have treatment for any significant cardiac abnormalities prior to her surgery for bladder cancer  2  Chronic kidney disease will get hydration prior to cardiac catheterization as per Cardiology and Nephrology  3  Hypertension not at goal will increase labetalol to 200 mg 1 tablet twice a day  4  Patient with renal artery stenosis may have a lot to do with her difficult to control blood pressure may consider vascular intervention would has been refer to Dr Eulalia Oliver  5  Patient with type 2 diabetes is at goal  6  Tobacco abuse strongly recommend cessation  7  Severe coronary disease has statin intolerance may benefit from Repatha to be discuss with Cardiology she does have statin intolerance  8  Aortoiliac stenosis on the left will be referred to Dr Eulalia Oliver has no ulcers on her feet at this time  9  Bladder tumor if she is cleared by cardiology she can't have surgery  10   Patient with celiac artery stenosis has no food fear or no significant abdominal pain once again will refer to Dr Eulalia Oliver winter she develops abdominal pain to see emergency room as soon as possible to rule out ischemic bowel              Diagnoses and all orders for this visit:    Cerebrovascular accident (CVA), unspecified mechanism ([place] [address] )    Acute drug-induced gout of left foot  -     labetalol (NORMODYNE) 200 mg tablet; Take 1 tablet (200 mg total) by mouth 2 (two) times a day    Bladder tumor    Atherosclerosis of renal artery (HCC)    Claudication in peripheral vascular disease ([place] [address] )    Type 2 diabetes mellitus with diabetic nephropathy, without long-term current use of insulin (HCC)    Hypercholesteremia  -     Icosapent Ethyl (VASCEPA) 1 g CAPS; 2 cap bid with meals    Encounter for hepatitis C screening test for low risk patient  -     Hepatitis C antibody; Future    Hypertension, unspecified type    Hypertension associated with diabetes ([place] [address] )    Vitamin D deficiency    Statin intolerance    Aortoiliac stenosis, left (HCC)    Decreased pulses in feet    Abdominal bruit    Kidney disease, chronic, stage III (moderate, EGFR 30-59 ml/min) ([place] [address] )  -     Ambulatory referral to Nephrology; Future    Right renal artery stenosis (HCC)    Tobacco use disorder        The patient was counseled regarding instructions for management, risk factor reductions, patient and family education,impressions, risks and benefits of treatment options, side effects of medications, importance of compliance with treatment  The treatment plan was reviewed with the patient/guardian and patient/guardian understands and agrees with the treatment plan              Current Outpatient Medications:     amLODIPine (NORVASC) 5 mg tablet, Take 1 tablet (5 mg total) by mouth 2 (two) times a day, Disp: 60 tablet, Rfl: 11    Cholecalciferol (VITAMIN D3) 1000 units CAPS, Take 1 capsule by mouth every other day , Disp: , Rfl:     clopidogrel (PLAVIX) 75 mg tablet, Take 1 tablet (75 mg total) by mouth daily, Disp: 90 tablet, Rfl: 1    labetalol (NORMODYNE) 200 mg tablet, Take 1 tablet (200 mg total) by mouth 2 (two) times a day, Disp: 180 tablet, Rfl: 2    losartan (COZAAR) 100 MG tablet, Take 100 mg by mouth every morning , Disp: , Rfl:     losartan (COZAAR) 50 mg tablet, Take 1 tablet by mouth daily  , Disp: , Rfl:     traMADol (ULTRAM) 50 mg tablet, Take 1 tablet (50 mg total) by mouth every 8 (eight) hours as needed for moderate pain, Disp: 60 tablet, Rfl: 1    Icosapent Ethyl (VASCEPA) 1 g CAPS, 2 cap bid with meals, Disp: 360 capsule, Rfl: 2    Subjective:      Patient ID: Mercy Smith is a 69 y o  female  Denies any abdominal pain or chest pain or shortness of breath has been having significant blood in the urine      The following portions of the patient's history were reviewed and updated as appropriate:   She has a past medical history of Arthritis, Coronary artery disease, Diabetes mellitus ([facility] [address] ), Endometriosis, High cholesterol, Hypertension, Kidney disease, chronic, stage III (moderate, EGFR 30-59 ml/min) ([facility]), Lumbar disc herniation, Peripheral vascular disease ([facility] [address] ), Shoulder injury, Spinal stenosis, and Stroke ([facility] [address] )  ,  does not have any pertinent problems on file  ,   has a past surgical history that includes Rotator cuff repair (Left); Tonsillectomy; Ovarian cyst surgery; and Fracture surgery  ,  family history includes Arthritis in her brother; Heart defect in her father; Heart disease in her mother; Hyperlipidemia in her mother; Hypertension in her father and mother; Other in her brother; Stroke in her sister  ,   reports that she has been smoking cigarettes  She has been smoking about 1 00 pack per day  She has never used smokeless tobacco  She reports that she does not drink alcohol or use drugs  ,  is allergic to atorvastatin; colesevelam; colestipol; ezetimibe; fenofibrate; pollen extract; rosuvastatin; and statins       Review of Systems   Constitutional: Negative for appetite change, chills, fatigue, fever and unexpected weight change     HENT: Negative for congestion, ear pain, facial swelling, hearing loss, mouth sores, nosebleeds, postnasal drip, rhinorrhea, sinus pain, sore throat, trouble swallowing and voice change  Eyes: Negative for pain, discharge, redness and visual disturbance  Respiratory: Negative for apnea, chest tightness, shortness of breath, wheezing and stridor  Cardiovascular: Negative for chest pain, palpitations and leg swelling  Gastrointestinal: Negative for abdominal distention, abdominal pain, blood in stool, constipation, diarrhea and vomiting  Endocrine: Negative for cold intolerance, heat intolerance, polydipsia, polyphagia and polyuria  Genitourinary: Positive for hematuria  Negative for difficulty urinating, dysuria, flank pain, frequency, genital sores and urgency  Musculoskeletal: Negative for arthralgias and back pain  Skin: Negative for rash and wound  Allergic/Immunologic: Negative for environmental allergies, food allergies and immunocompromised state  Neurological: Negative for dizziness, tremors, seizures, syncope, facial asymmetry, speech difficulty, weakness, light-headedness, numbness and headaches  Hematological: Negative for adenopathy  Does not bruise/bleed easily  Psychiatric/Behavioral: Negative for agitation, behavioral problems, dysphoric mood, hallucinations, self-injury, sleep disturbance and suicidal ideas  The patient is not hyperactive  Objective:  BP (!) 180/95 (BP Location: Left arm, Patient Position: Sitting)   Pulse 69   Temp (!) 97 3 °F (36 3 °C)   Resp 16   Ht 4' 10" (1 473 m)   Wt 74 8 kg (165 lb)   LMP  (LMP Unknown)   SpO2 98%   BMI 34 49 kg/m²      Lab Review  Orders Only on 01/15/2020   Component Date Value    Class Description 01/15/2020 Comment     D  PTERONYSSINUS IGE 01/15/2020 <0 10     D   FARINAE 01/15/2020 <0 10     Cat Dander 01/15/2020 <0 10     DOG DANDER IGE 01/15/2020 <0 10     BERMUDA GRASS IGE 01/15/2020 <0 10     Blue Grass 01/15/2020 <0 10    Sanaz Emmanuel Grass 01/15/2020 <0 10     [address] [address] [place] 01/15/2020 <0 10     Q329-HxN Rina Crock* 01/15/2020 <0 10     PENICILLIUM CHRYSOGENUM 01/15/2020 <0 10     Cladosporium Herbarum 01/15/2020 <0 10     Aspergillus fumigatus 01/15/2020 <0 10     Mucor Racemosus 01/15/2020 <0 10     Alternaria Alternata 01/15/2020 <0 10     S  HERBARUM 01/15/2020 <0 10     Birch 01/15/2020 <0 10     T007 [place], White 01/15/2020 <0 10     [place] 01/15/2020 <0 10     MAPLE/BOX ELDER IGE 01/15/2020 <0 10     [place] 01/15/2020 <0 10     T070 White [place] 01/15/2020 <0 10     MOUNTAIN CEDAR 01/15/2020 <0 10     Short Ragwd(A jocy ) * 01/15/2020 <0 10     Plantain 01/15/2020 <0 10     COMMON PIGWEED 01/15/2020 <0 10     Nettle 01/15/2020 <0 10     Aspergillus fumigatus 3 * 01/15/2020 Negative     Aspergillus fumigatus 2 * 01/15/2020 Negative     Saccharomonospora Viridi* 01/15/2020 Negative     Thermoactinomyces Candid* 01/15/2020 Negative     A  Fumigatus #1 Abs 01/15/2020 Negative     Aspergillus fumigatus 6 * 01/15/2020 Negative     A  Pullulans Abs 01/15/2020 Negative     M  FAENI ABS 01/15/2020 Negative     [place] Serum Abs 01/15/2020 Negative     T VULGARIS#1 01/15/2020 Negative    Orders Only on 01/15/2020   Component Date Value    White Blood Cell Count 01/15/2020 8 1     Red Blood Cell Count 01/15/2020 5 06     Hemoglobin 01/15/2020 15 2     HCT 01/15/2020 43 5     MCV 01/15/2020 86     MCH 01/15/2020 30 0     MCHC 01/15/2020 34 9     RDW 01/15/2020 14 1     Platelet Count 23/52/2694 261     Neutrophils 01/15/2020 62     Lymphocytes 01/15/2020 24     Monocytes 01/15/2020 9     Eosinophils 01/15/2020 3     Basophils PCT 01/15/2020 1     Neutrophils (Absolute) 01/15/2020 5 1     Lymphocytes (Absolute) 01/15/2020 1 9     Monocytes (Absolute) 01/15/2020 0 7     Eosinophils (Absolute) 01/15/2020 0 3     Basophils ABS 01/15/2020 0 0     Immature Granulocytes 01/15/2020 1     Immature Granulocytes (A* 01/15/2020 0 0     Glucose, Random 01/15/2020 117*    BUN 01/15/2020 23     Creatinine 01/15/2020 1 55*    eGFR Non  01/15/2020 33*    eGFR  01/15/2020 38*    SL AMB BUN/CREATININE RA* 01/15/2020 15     Sodium 01/15/2020 139     Potassium 01/15/2020 4 3     Chloride 01/15/2020 100     CO2 01/15/2020 21     CALCIUM 01/15/2020 9 7     Protein, Total 01/15/2020 7 0     Albumin 01/15/2020 4 5     Globulin, Total 01/15/2020 2 5     Albumin/Globulin Ratio 01/15/2020 1 8     TOTAL BILIRUBIN 01/15/2020 0 3     Alk Phos Isoenzymes 01/15/2020 103     AST 01/15/2020 16     ALT 01/15/2020 16     Specific Gravity 01/15/2020 1 011     Ph 01/15/2020 6 5     Color UA 01/15/2020 Red*    Urine Appearance 01/15/2020 Clear     Leukocyte Esterase 01/15/2020 Trace*    Protein 01/15/2020 2+*    Glucose, 24 HR Urine 01/15/2020 Negative     Ketone, Urine 01/15/2020 Negative     Blood, Urine 01/15/2020 3+*    Bilirubin, Urine 01/15/2020 Negative     Urobilinogen Urine 01/15/2020 0 2     SL AMB NITRITES URINE, Q* 01/15/2020 Negative     Microscopic Examination 01/15/2020 See below:     Urinalysis Reflex 01/15/2020 Comment     SL AMB WBC, URINE 01/15/2020 0-5     RBC, Urine 01/15/2020 >30*    Epithelial Cells (non re* 01/15/2020 0-10     Bacteria, Urine 01/15/2020 Few     Urine Culture Result 01/15/2020 Final report     Result 1 01/15/2020 No growth     Creatinine, Urine 01/15/2020 55 4     Microalbum  ,U,Random 01/15/2020 522 8     Microalb/Creat Ratio 01/15/2020 943 7*    Hemoglobin A1C 01/15/2020 5 9*    25-HYDROXY VIT D 01/15/2020 30 9     TSH 01/15/2020 2 880     Uric Acid 01/15/2020 7 2*    Magnesium, Serum 01/15/2020 1 9    Transcribe Orders on 11/08/2019   Component Date Value    Case Report 11/08/2019                      Value:Non-gynecologic Cytology                          Case: [id number]                                  Authorizing Provider:  Renetta Samaniego MD        Collected:           11/08/2019 1103              Ordering Location:     [address] [place] [address]      Received:            11/08/2019 1103                                     Laboratory Services                                                          Pathologist:           Annabelle Breaux MD                                                   Specimen:    Urine, Other                                                                               Final Diagnosis 11/08/2019                      Value: This result contains rich text formatting which cannot be displayed here  [facility] Gross Description 11/08/2019                      Value: This result contains rich text formatting which cannot be displayed here   Additional Information 11/08/2019                      Value: This result contains rich text formatting which cannot be displayed here  Hospital Outpatient Visit on 11/08/2019   Component Date Value    Protocol Name 11/08/2019 LEXISCAN-SIT     Time In Exercise Phase 11/08/2019 00:03:00     MAX   SYSTOLIC BP 29/35/4168 022     Max Diastolic Bp 60/72/8006 84     Max Heart Rate 11/08/2019 84     Max Predicted Heart Rate 11/08/2019 148     Reason for Termination 11/08/2019 Protocol Complete     Test Indication 11/08/2019 Abnormal ECG     Target Hr Formular 11/08/2019 (220 - Age)*85%    Office Visit on 11/04/2019   Component Date Value    Ventricular Rate 11/04/2019 63     Atrial Rate 11/04/2019 63     AL Interval 11/04/2019 174     QRSD Interval 11/04/2019 84     QT Interval 11/04/2019 402     QTC Interval 11/04/2019 411     P Axis 11/04/2019 64     QRS Axis 11/04/2019 2     T Wave [place] 11/04/2019 102    Appointment on 11/04/2019   Component Date Value    Sodium 11/04/2019 143     Potassium 11/04/2019 4 7     Chloride 11/04/2019 105     CO2 11/04/2019 30     ANION GAP 11/04/2019 8     BUN 11/04/2019 24     Creatinine 11/04/2019 1 56*    Glucose, Fasting 11/04/2019 171*    Calcium 11/04/2019 9 9     eGFR 11/04/2019 33     WBC 11/04/2019 8 32     RBC 11/04/2019 5 17*    Hemoglobin 11/04/2019 14 9     Hematocrit 11/04/2019 46 4*    MCV 11/04/2019 90     MCH 11/04/2019 28 8     MCHC 11/04/2019 32 1     RDW 11/04/2019 13 3     MPV 11/04/2019 10 7     Platelets 16/85/3689 258     nRBC 11/04/2019 0     Neutrophils Relative 11/04/2019 65     Immat GRANS % 11/04/2019 1     Lymphocytes Relative 11/04/2019 22     Monocytes Relative 11/04/2019 8     Eosinophils Relative 11/04/2019 3     Basophils Relative 11/04/2019 1     Neutrophils Absolute 11/04/2019 5 42     Immature Grans Absolute 11/04/2019 0 05     Lymphocytes Absolute 11/04/2019 1 86     Monocytes Absolute 11/04/2019 0 67     Eosinophils Absolute 11/04/2019 0 26     Basophils Absolute 11/04/2019 0 06     Hemoglobin A1C 11/04/2019 5 7     EAG 11/04/2019 117     Urine Culture 11/08/2019 30,000-39,000 cfu/ml      Creatinine, Ur 11/04/2019 144 0     Microalbum  ,U,Random 11/04/2019 1,140 0*    Microalb Creat Ratio 11/04/2019 792*   Procedure visit on 10/31/2019   Component Date Value    LEUKOCYTE ESTERASE,UA 10/31/2019 NEG     NITRITE,UA 10/31/2019 NEG     SL AMB POCT UROBILINOGEN 10/31/2019 NEG     POCT URINE PROTEIN 10/31/2019 +++      PH,UA 10/31/2019 6     BLOOD,UA 10/31/2019 NEG     SPECIFIC GRAVITY,UA 10/31/2019 1 020     KETONES,UA 10/31/2019 Trace     BILIRUBIN,UA 10/31/2019 NEG     GLUCOSE, UA 10/31/2019 NEG      COLOR,UA 10/31/2019 Yellow     CLARITY,UA 10/31/2019 Clear     Clarity, UA 11/08/2019 Slightly Cloudy     Color, UA 11/08/2019 Yellow     Specific Gravity, UA 11/08/2019 1 010     pH, UA 11/08/2019 6 0     Glucose, UA 11/08/2019 Negative     Ketones, UA 11/08/2019 Negative     Blood, UA 11/08/2019 Large*    Protein, UA 11/08/2019 100 (2+)*    Nitrite, UA 11/08/2019 Negative     Bilirubin, UA 11/08/2019 Negative     Urobilinogen, UA 11/08/2019 0  2     Leukocytes, UA 11/08/2019 Negative     WBC, UA 11/08/2019 2-4*    RBC, UA 11/08/2019 30-50*    Bacteria, UA 11/08/2019 Occasional     Epithelial Cells 11/08/2019 Occasional     Color, UA 10/31/2019 Yellow     Clarity, UA 10/31/2019 Clear     Specific Gravity, UA 10/31/2019 1 020     pH, UA 10/31/2019 6 0     Leukocytes, UA 10/31/2019 Negative     Nitrite, UA 10/31/2019 Negative     Protein, UA 10/31/2019 100 (2+)*    Glucose, UA 10/31/2019 Negative     Ketones, UA 10/31/2019 Negative     Urobilinogen, UA 10/31/2019 0 2     Bilirubin, UA 10/31/2019 Negative     Blood, UA 10/31/2019 Moderate*    Case Report 10/31/2019                      Value:Non-gynecologic Cytology                          Case: [id number]                                  Authorizing Provider:  Laura Kaur MD        Collected:           10/31/2019 1440              Ordering Location:     [address] [place] [address] For        Received:            10/31/2019 1440                                     Urology [facility]                                                         Pathologist:           Cecille Mancilla MD                                                                 Specimen:    Urine, Clean Catch                                                                         Final Diagnosis 10/31/2019                      Value: This result contains rich text formatting which cannot be displayed here  [facility] Gross Description 10/31/2019                      Value: This result contains rich text formatting which cannot be displayed here   Additional Information 10/31/2019                      Value: This result contains rich text formatting which cannot be displayed here      RBC, UA 10/31/2019 None Seen     WBC, UA 10/31/2019 None Seen     Epithelial Cells 10/31/2019 None Seen     Bacteria, UA 10/31/2019 Occasional     Hyaline Casts, UA 10/31/2019 None Seen    Orders Only on 10/25/2019   Component Date Value    Specific Gravity 10/25/2019 1 013     Ph 10/25/2019 6 0     Color UA 10/25/2019 Yellow     Urine Appearance 10/25/2019 Clear     Leukocyte Esterase 10/25/2019 Negative     Protein 10/25/2019 2+*    Glucose, 24 HR Urine 10/25/2019 Negative     Ketone, Urine 10/25/2019 Negative     Blood, Urine 10/25/2019 1+*    Bilirubin, Urine 10/25/2019 Negative     Urobilinogen Urine 10/25/2019 0 2     SL AMB NITRITES URINE, Q* 10/25/2019 Negative     Microscopic Examination 10/25/2019 See below:     Urinalysis Reflex 10/25/2019 Comment     SL AMB WBC, URINE 10/25/2019 0-5     RBC, Urine 10/25/2019 0-2     Epithelial Cells (non re* 10/25/2019 0-10     Bacteria, Urine 10/25/2019 None seen          Imaging  @[email or web address]@  Recent Results (from the past 36053 hour(s))   NM myocardial perfusion spect (rx stress and/or rest)    [phone]    Rest/Stress Gated SPECT Myocardial Perfusion Imaging After Regadenoson    Patient: Chel Brennan  MR number: [id number]  Account number: [de-identified]  : 1945  Age: 69 years  Gender: Female  Status: Outpatient  Location: Stress lab  Height: 58 in  Weight: 163 lb  BP: 206/ 84 mmHg    Allergies: ATORVASTATIN, COLESEVELAM, COLESTIPOL, EZETIMIBE, FENOFIBRATE, POLLEN EXTRACT, ROSUVASTATIN, STATINS    Diagnosis: R94 31 - Abnormal electrocardiogram [ECG] [EKG]    Primary Physician:  Rachell Davila DO  Referring Physician:  Rachell Davila DO  Group:  [facility]  Other:  Neha Santos MS, CCT  Interpreting Physician:  Gama lAicia MD    INDICATIONS: Detection of coronary artery disease  Pre-operative risk assessment  HISTORY: The patient is a 69year old  female  Chest pain status: no chest pain  Coronary artery disease risk factors: dyslipidemia, hypertension, smoking, diabetes mellitus, and post-menopausal state   Cardiovascular history:  peripheral vascular occlusive disease, stroke, and transient ischemic attack  Co-morbidity: history of renal disease and obesity  GERD, renal and celiac stenosis  Medications: a beta blocker, a calcium channel blocker, an ACE  inhibitor/ARB, clopidogrel, and an antihypertensive agent  Previous test results: abnormal ECG and hyperlipidemia  PHYSICAL EXAM: Baseline physical exam screening: normal     REST ECG: Normal sinus rhythm  Nonspecific ST and T wave abnormalities were present  PROCEDURE: The study was performed in the the Stress lab  A regadenoson infusion pharmacologic stress test was performed  Gated SPECT myocardial perfusion imaging was performed after stress and at rest  Systolic blood pressure was 206  mmHg, at the start of the study  Diastolic blood pressure was 84 mmHg, at the start of the study  The heart rate was 63 bpm, at the start of the study  Regadenoson protocol:  HR bpm SBP mmHg DBP mmHg Symptoms Rhythm/conduct  Baseline 63 206 84 none NSR, no ectopy  Immediate 83 126 84 mild dyspnea, nausea NSR, no ectopy  1 min 76 -- -- none NSR, no ectopy  2 min 71 184 78 none NSR, no ectopy  Rest SpO2 98, Peak Stress SpO2 99    STRESS SUMMARY: Duration of pharmacologic stress was 3 min  Maximal heart rate during stress was 83 bpm  The rate-pressure product for the peak heart rate and blood pressure was 46288  There was no chest pain during stress  The stress test  was terminated due to protocol completion  The stress ECG was negative for ischemia and normal     ISOTOPE ADMINISTRATION:  Resting isotope administration Stress isotope administration  Agent Tetrofosmin Tetrofosmin  Dose 9 9 mCi 29 6 mCi  Date 11/08/2019 11/08/2019  Injection-image interval 30 min 30 min    The radiopharmaceutical was injected at the peak effect of pharmacologic stress  MYOCARDIAL PERFUSION IMAGING:  The image quality was good      PERFUSION DEFECTS:  -  There was a small, mildly severe, partially reversible myocardial perfusion defect of anterior and inferior wall  -  There was of the inferior and anterior wall  GATED SPECT:  The calculated left ventricular ejection fraction was 61 %  There was no left ventricular regional abnormality  SUMMARY:  -  Stress results: There was no chest pain during stress  -  ECG conclusions: The stress ECG was negative for ischemia and normal   -  Perfusion imaging: There was a small, mildly severe, partially reversible myocardial perfusion defect of anterior and inferior wall There was of the inferior and anterior wall  -  Gated SPECT: The calculated left ventricular ejection fraction was 61 %  There was no left ventricular regional abnormality  Prepared and signed by    Caitie Stark MD  Signed 2019 17:53:08     Echo complete with contrast if indicated    [address] [address]  [phone]    Transthoracic Echocardiogram  2D, M-mode, Doppler, and Color Doppler    Study date:  2019    Patient: Jonathan Ann  MR number: [id number]  Account number: [de-identified]  : 15-Juma-1945  Age: 69 years  Gender: Female  Status: Outpatient  Location: Echo lab  Height: 58 in  Weight: 163 lb  BP: 216/ 86 mmHg    Indications: Abnormal EKG, Assess left ventricular function  Diagnoses: R94 31 - Abnormal electrocardiogram [ECG] [EKG]    Sonographer:   [facility] Avani Bates  Referring Physician:  Junior Reis DO  Group:  [facility]  Interpreting Physician:  Caitie Stark MD    SUMMARY    LEFT VENTRICLE:  Systolic function was normal  Ejection fraction was estimated to be 60 %  There were no regional wall motion abnormalities  Wall thickness was mildly increased  Doppler parameters were consistent with abnormal left ventricular relaxation (grade 1 diastolic dysfunction)  Doppler parameters were consistent with elevated ventricular end-diastolic filling pressure      LEFT ATRIUM:  The atrium was mildly dilated  MITRAL VALVE:  There was mild annular calcification  There was mild regurgitation  TRICUSPID VALVE:  There was trace regurgitation  AORTA:  The root exhibited normal size and mild fibrocalcific change  HISTORY: Consistent with transient ischemic attack or stroke  PRIOR HISTORY: CKD, Risk factors: hypertension, diabetes, and hypercholesterolemia  PROCEDURE: The procedure was performed in the echo lab  This was a routine study  The transthoracic approach was used  The study included complete 2D imaging, M-mode, complete spectral Doppler, and color Doppler  The heart rate was 60 bpm,  at the start of the study  Images were obtained from the parasternal, apical, subcostal, and suprasternal notch acoustic windows  Image quality was adequate  LEFT VENTRICLE: Size was normal  Systolic function was normal  Ejection fraction was estimated to be 60 %  There were no regional wall motion abnormalities  Wall thickness was mildly increased  No evidence of apical thrombus  DOPPLER:  Doppler parameters were consistent with abnormal left ventricular relaxation (grade 1 diastolic dysfunction)  Doppler parameters were consistent with elevated ventricular end-diastolic filling pressure  RIGHT VENTRICLE: The size was normal  Systolic function was normal  Wall thickness was normal     LEFT ATRIUM: The atrium was mildly dilated  RIGHT ATRIUM: Size was normal     MITRAL VALVE: There was mild annular calcification  Valve structure was normal  There was mild-moderate calcification  There was mildly reduced leaflet separation  DOPPLER: The transmitral velocity was within the normal range  There was no  evidence for stenosis  There was mild regurgitation  AORTIC VALVE: The valve was trileaflet  Leaflets exhibited normal thickness and normal cuspal separation  DOPPLER: Transaortic velocity was within the normal range  There was no evidence for stenosis  There was no significant  regurgitation      TRICUSPID VALVE: The valve structure was normal  There was normal leaflet separation  DOPPLER: The transtricuspid velocity was within the normal range  There was no evidence for stenosis  There was trace regurgitation  PULMONIC VALVE: Leaflets exhibited normal thickness, no calcification, and normal cuspal separation  DOPPLER: The transpulmonic velocity was within the normal range  There was no significant regurgitation  PERICARDIUM: There was no pericardial effusion  The pericardium was normal in appearance  AORTA: The root exhibited normal size and mild fibrocalcific change  SYSTEMIC VEINS: IVC: The inferior vena cava was normal in size  SYSTEM MEASUREMENT TABLES    2D  %FS: 30 9 %  Ao Diam: 2 7 cm  EDV(Teich): 80 7 ml  EF(Teich): 58 9 %  ESV(Teich): 33 1 ml  IVSd: 1 1 cm  LA Area: 17 cm2  LA Diam: 3 9 cm  LVEDV MOD A4C: 115 5 ml  LVEF MOD A4C: 60 5 %  LVESV MOD A4C: 45 6 ml  LVIDd: 4 2 cm  LVIDs: 2 9 cm  LVLd A4C: 8 cm  LVLs A4C: 6 7 cm  LVPWd: 1 1 cm  RA Area: 15 1 cm2  RVIDd: 2 9 cm  SV MOD A4C: 69 9 ml  SV(Teich): 47 5 ml    MM  TAPSE: 2 1 cm    PW  E': 0 m/s  E/E': 27 9  MV A Carlos A: 1 5 m/s  MV Dec Titus: 3 4 m/s2  MV DecT: 293 8 ms  MV E Carlos A: 1 m/s  MV E/A Ratio: 0 7  MV PHT: 85 2 ms  MVA By PHT: 2 6 cm2    [facility]    Prepared and electronically signed by    Romero Edmond MD  Signed [id number] 17:32:58     ECG 12 lead   Result Value    Ventricular Rate 63    Atrial Rate 63    ID Interval 174    QRSD Interval 84    QT Interval 402    QTC Interval 411    P Axis 64    QRS Axis 2    T Wave Axis 102    Narrative    Normal sinus rhythm  Minimal voltage criteria for LVH, may be normal variant  Possible Inferior infarct , age undetermined  Anterior infarct , age undetermined  Confirmed by Sherry Nolasco ([id number]) on 11/4/2019 12:51:00 PM     No orders to display     No results found for this or any previous visit           Physical Exam   Constitutional: She is oriented to person, place, and time  She appears well-developed  HENT:   Right Ear: External ear normal    Left Ear: External ear normal    Eyes: Right eye exhibits no discharge  Left eye exhibits no discharge  No scleral icterus  Neck: Carotid bruit is not present  No tracheal deviation present  No thyroid mass and no thyromegaly present  Cardiovascular: Normal rate, regular rhythm, normal heart sounds and intact distal pulses  Exam reveals no gallop and no friction rub  No murmur heard  Pulmonary/Chest: No respiratory distress  She has no wheezes  She has no rales  Abdominal: Bowel sounds are normal  She exhibits abdominal bruit  She exhibits no distension, no pulsatile liver, no fluid wave, no ascites and no pulsatile midline mass  Musculoskeletal: She exhibits no edema  Lymphadenopathy:     She has no cervical adenopathy  Neurological: She is alert and oriented to person, place, and time  Coordination normal    Psychiatric: She has a normal mood and affect  Her behavior is normal  Judgment and thought content normal    Nursing note and vitals reviewed

## 2020-01-22 NOTE — LETTER
January 23, 2020     Severa Rondo, MD  [address]  [address]  [address]    Patient: Rubens Sears   YOB: 1945   Date of Visit: 1/22/2020       Dear Dr Fara Dickey: Thank you for referring Temo Jiang to me for evaluation  Below are my notes for this consultation  If you have questions, please do not hesitate to call me  I look forward to following your patient along with you  Sincerely,        Collette Yañez,         CC: MD Collette Rolle,   1/23/2020  6:00 PM  Sign at close encounter  Assessment/Plan:  lupe  1 patient with abnormal stress test with high likelihood of significant coronary disease in light of her multiple risk factors cardiac catheterization is recommended patient has concerns of breath or chronic kidney disease which is mild to moderate did discuss with Dr Fara Dickey highly recommends that she has cardiac catheterization so she can have treatment for any significant cardiac abnormalities prior to her surgery for bladder cancer  2  Chronic kidney disease will get hydration prior to cardiac catheterization as per Cardiology and Nephrology  3  Hypertension not at goal will increase labetalol to 200 mg 1 tablet twice a day  4  Patient with renal artery stenosis may have a lot to do with her difficult to control blood pressure may consider vascular intervention would has been refer to Dr Manny Murcia  5  Patient with type 2 diabetes is at goal  6  Tobacco abuse strongly recommend cessation  7  Severe coronary disease has statin intolerance may benefit from Repatha to be discuss with Cardiology she does have statin intolerance  8  Aortoiliac stenosis on the left will be referred to Dr Manny Murcia has no ulcers on her feet at this time  9  Bladder tumor if she is cleared by cardiology she can't have surgery  10   Patient with celiac artery stenosis has no food fear or no significant abdominal pain once again will refer to Dr Manny Murcia winter she develops abdominal pain to see emergency room as soon as possible to rule out ischemic bowel              Diagnoses and all orders for this visit:    Cerebrovascular accident (CVA), unspecified mechanism ([place] [address] )    Acute drug-induced gout of left foot  -     labetalol (NORMODYNE) 200 mg tablet; Take 1 tablet (200 mg total) by mouth 2 (two) times a day    Bladder tumor    Atherosclerosis of renal artery (HCC)    Claudication in peripheral vascular disease ([place] [address] )    Type 2 diabetes mellitus with diabetic nephropathy, without long-term current use of insulin (HCC)    Hypercholesteremia  -     Icosapent Ethyl (VASCEPA) 1 g CAPS; 2 cap bid with meals    Encounter for hepatitis C screening test for low risk patient  -     Hepatitis C antibody; Future    Hypertension, unspecified type    Hypertension associated with diabetes ([place] [address] )    Vitamin D deficiency    Statin intolerance    Aortoiliac stenosis, left (HCC)    Decreased pulses in feet    Abdominal bruit    Kidney disease, chronic, stage III (moderate, EGFR 30-59 ml/min) ([place] [address] )  -     Ambulatory referral to Nephrology; Future    Right renal artery stenosis (HCC)    Tobacco use disorder        The patient was counseled regarding instructions for management, risk factor reductions, patient and family education,impressions, risks and benefits of treatment options, side effects of medications, importance of compliance with treatment  The treatment plan was reviewed with the patient/guardian and patient/guardian understands and agrees with the treatment plan              Current Outpatient Medications:     amLODIPine (NORVASC) 5 mg tablet, Take 1 tablet (5 mg total) by mouth 2 (two) times a day, Disp: 60 tablet, Rfl: 11    Cholecalciferol (VITAMIN D3) 1000 units CAPS, Take 1 capsule by mouth every other day , Disp: , Rfl:     clopidogrel (PLAVIX) 75 mg tablet, Take 1 tablet (75 mg total) by mouth daily, Disp: 90 tablet, Rfl: 1    labetalol (NORMODYNE) 200 mg tablet, Take 1 tablet (200 mg total) by mouth 2 (two) times a day, Disp: 180 tablet, Rfl: 2    losartan (COZAAR) 100 MG tablet, Take 100 mg by mouth every morning , Disp: , Rfl:     losartan (COZAAR) 50 mg tablet, Take 1 tablet by mouth daily  , Disp: , Rfl:     traMADol (ULTRAM) 50 mg tablet, Take 1 tablet (50 mg total) by mouth every 8 (eight) hours as needed for moderate pain, Disp: 60 tablet, Rfl: 1    Icosapent Ethyl (VASCEPA) 1 g CAPS, 2 cap bid with meals, Disp: 360 capsule, Rfl: 2    Subjective:      Patient ID: Mercy Smith is a 69 y o  female  Denies any abdominal pain or chest pain or shortness of breath has been having significant blood in the urine      The following portions of the patient's history were reviewed and updated as appropriate:   She has a past medical history of Arthritis, Coronary artery disease, Diabetes mellitus ([facility] [address] ), Endometriosis, High cholesterol, Hypertension, Kidney disease, chronic, stage III (moderate, EGFR 30-59 ml/min) ([facility]), Lumbar disc herniation, Peripheral vascular disease ([facility] [address] ), Shoulder injury, Spinal stenosis, and Stroke ([facility] [address] )  ,  does not have any pertinent problems on file  ,   has a past surgical history that includes Rotator cuff repair (Left); Tonsillectomy; Ovarian cyst surgery; and Fracture surgery  ,  family history includes Arthritis in her brother; Heart defect in her father; Heart disease in her mother; Hyperlipidemia in her mother; Hypertension in her father and mother; Other in her brother; Stroke in her sister  ,   reports that she has been smoking cigarettes  She has been smoking about 1 00 pack per day  She has never used smokeless tobacco  She reports that she does not drink alcohol or use drugs  ,  is allergic to atorvastatin; colesevelam; colestipol; ezetimibe; fenofibrate; pollen extract; rosuvastatin; and statins       Review of Systems   Constitutional: Negative for appetite change, chills, fatigue, fever and unexpected weight change     HENT: Negative for congestion, ear pain, facial swelling, hearing loss, mouth sores, nosebleeds, postnasal drip, rhinorrhea, sinus pain, sore throat, trouble swallowing and voice change  Eyes: Negative for pain, discharge, redness and visual disturbance  Respiratory: Negative for apnea, chest tightness, shortness of breath, wheezing and stridor  Cardiovascular: Negative for chest pain, palpitations and leg swelling  Gastrointestinal: Negative for abdominal distention, abdominal pain, blood in stool, constipation, diarrhea and vomiting  Endocrine: Negative for cold intolerance, heat intolerance, polydipsia, polyphagia and polyuria  Genitourinary: Positive for hematuria  Negative for difficulty urinating, dysuria, flank pain, frequency, genital sores and urgency  Musculoskeletal: Negative for arthralgias and back pain  Skin: Negative for rash and wound  Allergic/Immunologic: Negative for environmental allergies, food allergies and immunocompromised state  Neurological: Negative for dizziness, tremors, seizures, syncope, facial asymmetry, speech difficulty, weakness, light-headedness, numbness and headaches  Hematological: Negative for adenopathy  Does not bruise/bleed easily  Psychiatric/Behavioral: Negative for agitation, behavioral problems, dysphoric mood, hallucinations, self-injury, sleep disturbance and suicidal ideas  The patient is not hyperactive  Objective:  BP (!) 180/95 (BP Location: Left arm, Patient Position: Sitting)   Pulse 69   Temp (!) 97 3 °F (36 3 °C)   Resp 16   Ht 4' 10" (1 473 m)   Wt 74 8 kg (165 lb)   LMP  (LMP Unknown)   SpO2 98%   BMI 34 49 kg/m²      Lab Review  Orders Only on 01/15/2020   Component Date Value    Class Description 01/15/2020 Comment     D  PTERONYSSINUS IGE 01/15/2020 <0 10     D   FARINAE 01/15/2020 <0 10     Cat Dander 01/15/2020 <0 10     DOG DANDER IGE 01/15/2020 <0 10     BERMUDA GRASS IGE 01/15/2020 <0 10     Blue Grass 01/15/2020 <0 10     Nima Grass 01/15/2020 <0 10     [place] [address] Grass 01/15/2020 <0 10     T148-YmP Kalyan Satya* 01/15/2020 <0 10     PENICILLIUM CHRYSOGENUM 01/15/2020 <0 10     Cladosporium Herbarum 01/15/2020 <0 10     Aspergillus fumigatus 01/15/2020 <0 10     Mucor Racemosus 01/15/2020 <0 10     Alternaria Alternata 01/15/2020 <0 10     S  HERBARUM 01/15/2020 <0 10     Birch 01/15/2020 <0 10     T007 Wilson, White 01/15/2020 <0 10     [place]m [place] 01/15/2020 <0 10     MAPLE/BOX ELDER IGE 01/15/2020 <0 10     [place] 01/15/2020 <0 10     T070 White [place] 01/15/2020 <0 10     MOUNTAIN CEDAR 01/15/2020 <0 10     Short Ragwd(A jocy ) * 01/15/2020 <0 10     Plantain 01/15/2020 <0 10     COMMON PIGWEED 01/15/2020 <0 10     Nettle 01/15/2020 <0 10     Aspergillus fumigatus 3 * 01/15/2020 Negative     Aspergillus fumigatus 2 * 01/15/2020 Negative     Saccharomonospora Viridi* 01/15/2020 Negative     Thermoactinomyces Candid* 01/15/2020 Negative     A  Fumigatus #1 Abs 01/15/2020 Negative     Aspergillus fumigatus 6 * 01/15/2020 Negative     A  Pullulans Abs 01/15/2020 Negative     M  FAENI ABS 01/15/2020 Negative     [place] Serum Abs 01/15/2020 Negative     T VULGARIS#1 01/15/2020 Negative    Orders Only on 01/15/2020   Component Date Value    White Blood Cell Count 01/15/2020 8 1     Red Blood Cell Count 01/15/2020 5 06     Hemoglobin 01/15/2020 15 2     HCT 01/15/2020 43 5     MCV 01/15/2020 86     MCH 01/15/2020 30 0     MCHC 01/15/2020 34 9     RDW 01/15/2020 14 1     Platelet Count 00/56/7068 261     Neutrophils 01/15/2020 62     Lymphocytes 01/15/2020 24     Monocytes 01/15/2020 9     Eosinophils 01/15/2020 3     Basophils PCT 01/15/2020 1     Neutrophils (Absolute) 01/15/2020 5 1     Lymphocytes (Absolute) 01/15/2020 1 9     Monocytes (Absolute) 01/15/2020 0 7     Eosinophils (Absolute) 01/15/2020 0 3     Basophils ABS 01/15/2020 0 0     Immature Granulocytes 01/15/2020 1  Immature Granulocytes (A* 01/15/2020 0 0     Glucose, Random 01/15/2020 117*    BUN 01/15/2020 23     Creatinine 01/15/2020 1 55*    eGFR Non  01/15/2020 33*    eGFR  01/15/2020 38*    SL AMB BUN/CREATININE RA* 01/15/2020 15     Sodium 01/15/2020 139     Potassium 01/15/2020 4 3     Chloride 01/15/2020 100     CO2 01/15/2020 21     CALCIUM 01/15/2020 9 7     Protein, Total 01/15/2020 7 0     Albumin 01/15/2020 4 5     Globulin, Total 01/15/2020 2 5     Albumin/Globulin Ratio 01/15/2020 1 8     TOTAL BILIRUBIN 01/15/2020 0 3     Alk Phos Isoenzymes 01/15/2020 103     AST 01/15/2020 16     ALT 01/15/2020 16     Specific Gravity 01/15/2020 1 011     Ph 01/15/2020 6 5     Color UA 01/15/2020 Red*    Urine Appearance 01/15/2020 Clear     Leukocyte Esterase 01/15/2020 Trace*    Protein 01/15/2020 2+*    Glucose, 24 HR Urine 01/15/2020 Negative     Ketone, Urine 01/15/2020 Negative     Blood, Urine 01/15/2020 3+*    Bilirubin, Urine 01/15/2020 Negative     Urobilinogen Urine 01/15/2020 0 2     SL AMB NITRITES URINE, Q* 01/15/2020 Negative     Microscopic Examination 01/15/2020 See below:     Urinalysis Reflex 01/15/2020 Comment     SL AMB WBC, URINE 01/15/2020 0-5     RBC, Urine 01/15/2020 >30*    Epithelial Cells (non re* 01/15/2020 0-10     Bacteria, Urine 01/15/2020 Few     Urine Culture Result 01/15/2020 Final report     Result 1 01/15/2020 No growth     Creatinine, Urine 01/15/2020 55 4     Microalbum  ,U,Random 01/15/2020 522 8     Microalb/Creat Ratio 01/15/2020 943 7*    Hemoglobin A1C 01/15/2020 5 9*    25-HYDROXY VIT D 01/15/2020 30 9     TSH 01/15/2020 2 880     Uric Acid 01/15/2020 7 2*    Magnesium, Serum 01/15/2020 1 9    Transcribe Orders on 11/08/2019   Component Date Value    Case Report 11/08/2019                      Value:Non-gynecologic Cytology                          Case: [id number] Authorizing Provider:  Pratik Oconnor MD        Collected:           11/08/2019 1103              Ordering Location:     [address] [place] [address]      Received:            11/08/2019 1103                                     Laboratory Services                                                          Pathologist:           Johnny Khan MD                                                   Specimen:    Urine, Other                                                                               Final Diagnosis 11/08/2019                      Value: This result contains rich text formatting which cannot be displayed here  Amy Crystal Gross Description 11/08/2019                      Value: This result contains rich text formatting which cannot be displayed here   Additional Information 11/08/2019                      Value: This result contains rich text formatting which cannot be displayed here  Hospital Outpatient Visit on 11/08/2019   Component Date Value    Protocol Name 11/08/2019 LEXISCAN-SIT     Time In Exercise Phase 11/08/2019 00:03:00     MAX   SYSTOLIC BP 68/27/3777 398     Max Diastolic Bp 27/62/5803 84     Max Heart Rate 11/08/2019 84     Max Predicted Heart Rate 11/08/2019 148     Reason for Termination 11/08/2019 Protocol Complete     Test Indication 11/08/2019 Abnormal ECG     Target Hr Formular 11/08/2019 (220 - Age)*85%    Office Visit on 11/04/2019   Component Date Value    Ventricular Rate 11/04/2019 63     Atrial Rate 11/04/2019 63     MO Interval 11/04/2019 174     QRSD Interval 11/04/2019 84     QT Interval 11/04/2019 402     QTC Interval 11/04/2019 411     P Axis 11/04/2019 64     QRS Axis 11/04/2019 2     T Wave [place] 11/04/2019 102    Appointment on 11/04/2019   Component Date Value    Sodium 11/04/2019 143     Potassium 11/04/2019 4 7     Chloride 11/04/2019 105     CO2 11/04/2019 30     ANION GAP 11/04/2019 8     BUN 11/04/2019 24     Creatinine 11/04/2019 1 56*    Glucose, Fasting 11/04/2019 171*    Calcium 11/04/2019 9 9     eGFR 11/04/2019 33     WBC 11/04/2019 8 32     RBC 11/04/2019 5 17*    Hemoglobin 11/04/2019 14 9     Hematocrit 11/04/2019 46 4*    MCV 11/04/2019 90     MCH 11/04/2019 28 8     MCHC 11/04/2019 32 1     RDW 11/04/2019 13 3     MPV 11/04/2019 10 7     Platelets 96/52/0601 258     nRBC 11/04/2019 0     Neutrophils Relative 11/04/2019 65     Immat GRANS % 11/04/2019 1     Lymphocytes Relative 11/04/2019 22     Monocytes Relative 11/04/2019 8     Eosinophils Relative 11/04/2019 3     Basophils Relative 11/04/2019 1     Neutrophils Absolute 11/04/2019 5 42     Immature Grans Absolute 11/04/2019 0 05     Lymphocytes Absolute 11/04/2019 1 86     Monocytes Absolute 11/04/2019 0 67     Eosinophils Absolute 11/04/2019 0 26     Basophils Absolute 11/04/2019 0 06     Hemoglobin A1C 11/04/2019 5 7     EAG 11/04/2019 117     Urine Culture 11/08/2019 30,000-39,000 cfu/ml      Creatinine, Ur 11/04/2019 144 0     Microalbum  ,U,Random 11/04/2019 1,140 0*    Microalb Creat Ratio 11/04/2019 792*   Procedure visit on 10/31/2019   Component Date Value    LEUKOCYTE ESTERASE,UA 10/31/2019 NEG     NITRITE,UA 10/31/2019 NEG     SL AMB POCT UROBILINOGEN 10/31/2019 NEG     POCT URINE PROTEIN 10/31/2019 +++      PH,UA 10/31/2019 6     BLOOD,UA 10/31/2019 NEG     SPECIFIC GRAVITY,UA 10/31/2019 1 020     KETONES,UA 10/31/2019 Trace     BILIRUBIN,UA 10/31/2019 NEG     GLUCOSE, UA 10/31/2019 NEG      COLOR,UA 10/31/2019 Yellow     CLARITY,UA 10/31/2019 Clear     Clarity, UA 11/08/2019 Slightly Cloudy     Color, UA 11/08/2019 Yellow     Specific Gravity, UA 11/08/2019 1 010     pH, UA 11/08/2019 6 0     Glucose, UA 11/08/2019 Negative     Ketones, UA 11/08/2019 Negative     Blood, UA 11/08/2019 Large*    Protein, UA 11/08/2019 100 (2+)*    Nitrite, UA 11/08/2019 Negative     Bilirubin, UA 11/08/2019 Negative     Urobilinogen, UA 11/08/2019 0 2     Leukocytes, UA 11/08/2019 Negative     WBC, UA 11/08/2019 2-4*    RBC, UA 11/08/2019 30-50*    Bacteria, UA 11/08/2019 Occasional     Epithelial Cells 11/08/2019 Occasional     Color, UA 10/31/2019 Yellow     Clarity, UA 10/31/2019 Clear     Specific Gravity, UA 10/31/2019 1 020     pH, UA 10/31/2019 6 0     Leukocytes, UA 10/31/2019 Negative     Nitrite, UA 10/31/2019 Negative     Protein, UA 10/31/2019 100 (2+)*    Glucose, UA 10/31/2019 Negative     Ketones, UA 10/31/2019 Negative     Urobilinogen, UA 10/31/2019 0 2     Bilirubin, UA 10/31/2019 Negative     Blood, UA 10/31/2019 Moderate*    Case Report 10/31/2019                      Value:Non-gynecologic Cytology                          Case: [id number]                                  Authorizing Provider:  Olga Jain MD        Collected:           10/31/2019 1440              Ordering Location:     [facility]        Received:            10/31/2019 1440                                     Urology [facility]                                                         Pathologist:           Abigail Wylie MD                                                                 Specimen:    Urine, Clean Catch                                                                         Final Diagnosis 10/31/2019                      Value: This result contains rich text formatting which cannot be displayed here  Garrison Chanel Gross Description 10/31/2019                      Value: This result contains rich text formatting which cannot be displayed here   Additional Information 10/31/2019                      Value: This result contains rich text formatting which cannot be displayed here      RBC, UA 10/31/2019 None Seen     WBC, UA 10/31/2019 None Seen     Epithelial Cells 10/31/2019 None Seen     Bacteria, UA 10/31/2019 Occasional     Hyaline Casts, UA 10/31/2019 None Seen    Orders Only on 10/25/2019   Component Date Value    Specific Gravity 10/25/2019 1 013     Ph 10/25/2019 6 0     Color UA 10/25/2019 Yellow     Urine Appearance 10/25/2019 Clear     Leukocyte Esterase 10/25/2019 Negative     Protein 10/25/2019 2+*    Glucose, 24 HR Urine 10/25/2019 Negative     Ketone, Urine 10/25/2019 Negative     Blood, Urine 10/25/2019 1+*    Bilirubin, Urine 10/25/2019 Negative     Urobilinogen Urine 10/25/2019 0 2     SL AMB NITRITES URINE, Q* 10/25/2019 Negative     Microscopic Examination 10/25/2019 See below:     Urinalysis Reflex 10/25/2019 Comment     SL AMB WBC, URINE 10/25/2019 0-5     RBC, Urine 10/25/2019 0-2     Epithelial Cells (non re* 10/25/2019 0-10     Bacteria, Urine 10/25/2019 None seen          Imaging  @[id number]@  Recent Results (from the past 63802 hour(s))   NM myocardial perfusion spect (rx stress and/or rest)    Narrative    [place] [address] [place] [address]  [phone]    Rest/Stress Gated SPECT Myocardial Perfusion Imaging After Regadenoson    Patient: Jesus Tejada  MR number: [id number]  Account number: [de-identified]  : 1945  Age: 69 years  Gender: Female  Status: Outpatient  Location: Stress lab  Height: 58 in  Weight: 163 lb  BP: 206/ 84 mmHg    Allergies: ATORVASTATIN, COLESEVELAM, COLESTIPOL, EZETIMIBE, FENOFIBRATE, POLLEN EXTRACT, ROSUVASTATIN, STATINS    Diagnosis: R94 31 - Abnormal electrocardiogram [ECG] [EKG]    Primary Physician:  Elenita Levine DO  Referring Physician:  Elenita Levine DO  Group:  [facility]  Other:  Columba Colunga MS, CCT  Interpreting Physician:  Romero Edmond MD    INDICATIONS: Detection of coronary artery disease  Pre-operative risk assessment  HISTORY: The patient is a 69year old  female  Chest pain status: no chest pain  Coronary artery disease risk factors: dyslipidemia, hypertension, smoking, diabetes mellitus, and post-menopausal state   Cardiovascular history:  peripheral vascular occlusive disease, stroke, and transient ischemic attack  Co-morbidity: history of renal disease and obesity  GERD, renal and celiac stenosis  Medications: a beta blocker, a calcium channel blocker, an ACE  inhibitor/ARB, clopidogrel, and an antihypertensive agent  Previous test results: abnormal ECG and hyperlipidemia  PHYSICAL EXAM: Baseline physical exam screening: normal     REST ECG: Normal sinus rhythm  Nonspecific ST and T wave abnormalities were present  PROCEDURE: The study was performed in the the Stress lab  A regadenoson infusion pharmacologic stress test was performed  Gated SPECT myocardial perfusion imaging was performed after stress and at rest  Systolic blood pressure was 206  mmHg, at the start of the study  Diastolic blood pressure was 84 mmHg, at the start of the study  The heart rate was 63 bpm, at the start of the study  Regadenoson protocol:  HR bpm SBP mmHg DBP mmHg Symptoms Rhythm/conduct  Baseline 63 206 84 none NSR, no ectopy  Immediate 83 126 84 mild dyspnea, nausea NSR, no ectopy  1 min 76 -- -- none NSR, no ectopy  2 min 71 184 78 none NSR, no ectopy  Rest SpO2 98, Peak Stress SpO2 99    STRESS SUMMARY: Duration of pharmacologic stress was 3 min  Maximal heart rate during stress was 83 bpm  The rate-pressure product for the peak heart rate and blood pressure was 07767  There was no chest pain during stress  The stress test  was terminated due to protocol completion  The stress ECG was negative for ischemia and normal     ISOTOPE ADMINISTRATION:  Resting isotope administration Stress isotope administration  Agent Tetrofosmin Tetrofosmin  Dose 9 9 mCi 29 6 mCi  Date 11/08/2019 11/08/2019  Injection-image interval 30 min 30 min    The radiopharmaceutical was injected at the peak effect of pharmacologic stress  MYOCARDIAL PERFUSION IMAGING:  The image quality was good      PERFUSION DEFECTS:  -  There was a small, mildly severe, partially reversible myocardial perfusion defect of anterior and inferior wall  -  There was of the inferior and anterior wall  GATED SPECT:  The calculated left ventricular ejection fraction was 61 %  There was no left ventricular regional abnormality  SUMMARY:  -  Stress results: There was no chest pain during stress  -  ECG conclusions: The stress ECG was negative for ischemia and normal   -  Perfusion imaging: There was a small, mildly severe, partially reversible myocardial perfusion defect of anterior and inferior wall There was of the inferior and anterior wall  -  Gated SPECT: The calculated left ventricular ejection fraction was 61 %  There was no left ventricular regional abnormality  Prepared and signed by    Flavio James MD  Signed 2019 17:53:08     Echo complete with contrast if indicated    Narrative    [address] [place], [place] [address] [phone]    Transthoracic Echocardiogram  2D, M-mode, Doppler, and Color Doppler    Study date:  2019    Patient: Tessie Andrews  MR number: [id number]  Account number: [de-identified]  : 15-Juma-1945  Age: 69 years  Gender: Female  Status: Outpatient  Location: Echo lab  Height: 58 in  Weight: 163 lb  BP: 216/ 86 mmHg    Indications: Abnormal EKG, Assess left ventricular function  Diagnoses: R94 31 - Abnormal electrocardiogram [ECG] [EKG]    Sonographer:   [facility] Avani Bates  Referring Physician:  Bill Burton DO  Group:  [facility]  Interpreting Physician:  Flavio James MD    SUMMARY    LEFT VENTRICLE:  Systolic function was normal  Ejection fraction was estimated to be 60 %  There were no regional wall motion abnormalities  Wall thickness was mildly increased  Doppler parameters were consistent with abnormal left ventricular relaxation (grade 1 diastolic dysfunction)  Doppler parameters were consistent with elevated ventricular end-diastolic filling pressure      LEFT ATRIUM:  The atrium was mildly dilated  MITRAL VALVE:  There was mild annular calcification  There was mild regurgitation  TRICUSPID VALVE:  There was trace regurgitation  AORTA:  The root exhibited normal size and mild fibrocalcific change  HISTORY: Consistent with transient ischemic attack or stroke  PRIOR HISTORY: CKD, Risk factors: hypertension, diabetes, and hypercholesterolemia  PROCEDURE: The procedure was performed in the echo lab  This was a routine study  The transthoracic approach was used  The study included complete 2D imaging, M-mode, complete spectral Doppler, and color Doppler  The heart rate was 60 bpm,  at the start of the study  Images were obtained from the parasternal, apical, subcostal, and suprasternal notch acoustic windows  Image quality was adequate  LEFT VENTRICLE: Size was normal  Systolic function was normal  Ejection fraction was estimated to be 60 %  There were no regional wall motion abnormalities  Wall thickness was mildly increased  No evidence of apical thrombus  DOPPLER:  Doppler parameters were consistent with abnormal left ventricular relaxation (grade 1 diastolic dysfunction)  Doppler parameters were consistent with elevated ventricular end-diastolic filling pressure  RIGHT VENTRICLE: The size was normal  Systolic function was normal  Wall thickness was normal     LEFT ATRIUM: The atrium was mildly dilated  RIGHT ATRIUM: Size was normal     MITRAL VALVE: There was mild annular calcification  Valve structure was normal  There was mild-moderate calcification  There was mildly reduced leaflet separation  DOPPLER: The transmitral velocity was within the normal range  There was no  evidence for stenosis  There was mild regurgitation  AORTIC VALVE: The valve was trileaflet  Leaflets exhibited normal thickness and normal cuspal separation  DOPPLER: Transaortic velocity was within the normal range  There was no evidence for stenosis   There was no significant  regurgitation  TRICUSPID VALVE: The valve structure was normal  There was normal leaflet separation  DOPPLER: The transtricuspid velocity was within the normal range  There was no evidence for stenosis  There was trace regurgitation  PULMONIC VALVE: Leaflets exhibited normal thickness, no calcification, and normal cuspal separation  DOPPLER: The transpulmonic velocity was within the normal range  There was no significant regurgitation  PERICARDIUM: There was no pericardial effusion  The pericardium was normal in appearance  AORTA: The root exhibited normal size and mild fibrocalcific change  SYSTEMIC VEINS: IVC: The inferior vena cava was normal in size      SYSTEM MEASUREMENT TABLES    2D  %FS: 30 9 %  Ao Diam: 2 7 cm  EDV(Teich): 80 7 ml  EF(Teich): 58 9 %  ESV(Teich): 33 1 ml  IVSd: 1 1 cm  LA Area: 17 cm2  LA Diam: 3 9 cm  LVEDV MOD A4C: 115 5 ml  LVEF MOD A4C: 60 5 %  LVESV MOD A4C: 45 6 ml  LVIDd: 4 2 cm  LVIDs: 2 9 cm  LVLd A4C: 8 cm  LVLs A4C: 6 7 cm  LVPWd: 1 1 cm  RA Area: 15 1 cm2  RVIDd: 2 9 cm  SV MOD A4C: 69 9 ml  SV(Teich): 47 5 ml    MM  TAPSE: 2 1 cm    PW  E': 0 m/s  E/E': 27 9  MV A Carlos A: 1 5 m/s  MV Dec [place]: 3 4 m/s2  MV DecT: 293 8 ms  MV E Carlos A: 1 m/s  MV E/A Ratio: 0 7  MV PHT: 85 2 ms  MVA By PHT: 2 6 cm2    [facility] Accredited [facility]ch[facility]    Prepared and electronically signed by    Araceli Harrison MD  Signed [id number] 17:32:58     ECG 12 lead   Result Value    Ventricular Rate 63    Atrial Rate 63    HI Interval 174    QRSD Interval 84    QT Interval 402    QTC Interval 411    P Axis 64    QRS Axis 2    T Wave Axis 102    Narrative    Normal sinus rhythm  Minimal voltage criteria for LVH, may be normal variant  Possible Inferior infarct , age undetermined  Anterior infarct , age undetermined  Confirmed by Jose Norris ([id number]) on 11/4/2019 12:51:00 PM     No orders to display     No results found for this or any previous visit          Physical Exam   Constitutional: She is oriented to person, place, and time  She appears well-developed  HENT:   Right Ear: External ear normal    Left Ear: External ear normal    Eyes: Right eye exhibits no discharge  Left eye exhibits no discharge  No scleral icterus  Neck: Carotid bruit is not present  No tracheal deviation present  No thyroid mass and no thyromegaly present  Cardiovascular: Normal rate, regular rhythm, normal heart sounds and intact distal pulses  Exam reveals no gallop and no friction rub  No murmur heard  Pulmonary/Chest: No respiratory distress  She has no wheezes  She has no rales  Abdominal: Bowel sounds are normal  She exhibits abdominal bruit  She exhibits no distension, no pulsatile liver, no fluid wave, no ascites and no pulsatile midline mass  Musculoskeletal: She exhibits no edema  Lymphadenopathy:     She has no cervical adenopathy  Neurological: She is alert and oriented to person, place, and time  Coordination normal    Psychiatric: She has a normal mood and affect  Her behavior is normal  Judgment and thought content normal    Nursing note and vitals reviewed

## 2020-01-22 NOTE — PROGRESS NOTES
Assessment/Plan:  lupe  1 patient with abnormal stress test with high likelihood of significant coronary disease in light of her multiple risk factors cardiac catheterization is recommended patient has concerns of breath or chronic kidney disease which is mild to moderate did discuss with Dr Fara Dickey highly recommends that she has cardiac catheterization so she can have treatment for any significant cardiac abnormalities prior to her surgery for bladder cancer  2  Chronic kidney disease will get hydration prior to cardiac catheterization as per Cardiology and Nephrology  3  Hypertension not at goal will increase labetalol to 200 mg 1 tablet twice a day  4  Patient with renal artery stenosis may have a lot to do with her difficult to control blood pressure may consider vascular intervention would has been refer to Dr Manny Murcia  5  Patient with type 2 diabetes is at goal  6  Tobacco abuse strongly recommend cessation  7  Severe coronary disease has statin intolerance may benefit from Repatha to be discuss with Cardiology she does have statin intolerance  8  Aortoiliac stenosis on the left will be referred to Dr aMnny Murcia has no ulcers on her feet at this time  9  Bladder tumor if she is cleared by cardiology she can't have surgery  10  Patient with celiac artery stenosis has no food fear or no significant abdominal pain once again will refer to Dr Manny Murcia winter she develops abdominal pain to see emergency room as soon as possible to rule out ischemic bowel              Diagnoses and all orders for this visit:    Cerebrovascular accident (CVA), unspecified mechanism ([facility] )    Acute drug-induced gout of left foot  -     labetalol (NORMODYNE) 200 mg tablet;  Take 1 tablet (200 mg total) by mouth 2 (two) times a day    Bladder tumor    Atherosclerosis of renal artery (HCC)    Claudication in peripheral vascular disease ([facility] )    Type 2 diabetes mellitus with diabetic nephropathy, without long-term current use of insulin ([place] [address] )    Hypercholesteremia  -     Icosapent Ethyl (VASCEPA) 1 g CAPS; 2 cap bid with meals    Encounter for hepatitis C screening test for low risk patient  -     Hepatitis C antibody; Future    Hypertension, unspecified type    Hypertension associated with diabetes ([place] [address] )    Vitamin D deficiency    Statin intolerance    Aortoiliac stenosis, left (HCC)    Decreased pulses in feet    Abdominal bruit    Kidney disease, chronic, stage III (moderate, EGFR 30-59 ml/min) ([place] [address] )  -     Ambulatory referral to Nephrology; Future    Right renal artery stenosis (HCC)    Tobacco use disorder        The patient was counseled regarding instructions for management, risk factor reductions, patient and family education,impressions, risks and benefits of treatment options, side effects of medications, importance of compliance with treatment  The treatment plan was reviewed with the patient/guardian and patient/guardian understands and agrees with the treatment plan  Current Outpatient Medications:     amLODIPine (NORVASC) 5 mg tablet, Take 1 tablet (5 mg total) by mouth 2 (two) times a day, Disp: 60 tablet, Rfl: 11    Cholecalciferol (VITAMIN D3) 1000 units CAPS, Take 1 capsule by mouth every other day , Disp: , Rfl:     clopidogrel (PLAVIX) 75 mg tablet, Take 1 tablet (75 mg total) by mouth daily, Disp: 90 tablet, Rfl: 1    labetalol (NORMODYNE) 200 mg tablet, Take 1 tablet (200 mg total) by mouth 2 (two) times a day, Disp: 180 tablet, Rfl: 2    losartan (COZAAR) 100 MG tablet, Take 100 mg by mouth every morning , Disp: , Rfl:     losartan (COZAAR) 50 mg tablet, Take 1 tablet by mouth daily  , Disp: , Rfl:     traMADol (ULTRAM) 50 mg tablet, Take 1 tablet (50 mg total) by mouth every 8 (eight) hours as needed for moderate pain, Disp: 60 tablet, Rfl: 1    Icosapent Ethyl (VASCEPA) 1 g CAPS, 2 cap bid with meals, Disp: 360 capsule, Rfl: 2    Subjective:      Patient ID: Pascual Macdonald is a 69 y o  female      Denies any abdominal pain or chest pain or shortness of breath has been having significant blood in the urine      The following portions of the patient's history were reviewed and updated as appropriate:   She has a past medical history of Arthritis, Coronary artery disease, Diabetes mellitus ([facility] ), Endometriosis, High cholesterol, Hypertension, Kidney disease, chronic, stage III (moderate, EGFR 30-59 ml/min) ([facility]), Lumbar disc herniation, Peripheral vascular disease ([facility] ), Shoulder injury, Spinal stenosis, and Stroke ([facility] )  ,  does not have any pertinent problems on file  ,   has a past surgical history that includes Rotator cuff repair (Left); Tonsillectomy; Ovarian cyst surgery; and Fracture surgery  ,  family history includes Arthritis in her brother; Heart defect in her father; Heart disease in her mother; Hyperlipidemia in her mother; Hypertension in her father and mother; Other in her brother; Stroke in her sister  ,   reports that she has been smoking cigarettes  She has been smoking about 1 00 pack per day  She has never used smokeless tobacco  She reports that she does not drink alcohol or use drugs  ,  is allergic to atorvastatin; colesevelam; colestipol; ezetimibe; fenofibrate; pollen extract; rosuvastatin; and statins       Review of Systems   Constitutional: Negative for appetite change, chills, fatigue, fever and unexpected weight change  HENT: Negative for congestion, ear pain, facial swelling, hearing loss, mouth sores, nosebleeds, postnasal drip, rhinorrhea, sinus pain, sore throat, trouble swallowing and voice change  Eyes: Negative for pain, discharge, redness and visual disturbance  Respiratory: Negative for apnea, chest tightness, shortness of breath, wheezing and stridor  Cardiovascular: Negative for chest pain, palpitations and leg swelling  Gastrointestinal: Negative for abdominal distention, abdominal pain, blood in stool, constipation, diarrhea and vomiting     Endocrine: Negative for cold intolerance, heat intolerance, polydipsia, polyphagia and polyuria  Genitourinary: Positive for hematuria  Negative for difficulty urinating, dysuria, flank pain, frequency, genital sores and urgency  Musculoskeletal: Negative for arthralgias and back pain  Skin: Negative for rash and wound  Allergic/Immunologic: Negative for environmental allergies, food allergies and immunocompromised state  Neurological: Negative for dizziness, tremors, seizures, syncope, facial asymmetry, speech difficulty, weakness, light-headedness, numbness and headaches  Hematological: Negative for adenopathy  Does not bruise/bleed easily  Psychiatric/Behavioral: Negative for agitation, behavioral problems, dysphoric mood, hallucinations, self-injury, sleep disturbance and suicidal ideas  The patient is not hyperactive  Objective:  BP (!) 180/95 (BP Location: Left arm, Patient Position: Sitting)   Pulse 69   Temp (!) 97 3 °F (36 3 °C)   Resp 16   Ht 4' 10" (1 473 m)   Wt 74 8 kg (165 lb)   LMP  (LMP Unknown)   SpO2 98%   BMI 34 49 kg/m²     Lab Review  Orders Only on 01/15/2020   Component Date Value    Class Description 01/15/2020 Comment     D  PTERONYSSINUS IGE 01/15/2020 <0 10     D  FARINAE 01/15/2020 <0 10     Cat Dander 01/15/2020 <0 10     DOG DANDER IGE 01/15/2020 <0 10     BERMUDA GRASS IGE 01/15/2020 <0 10     Blue Grass 01/15/2020 <0 10     Nima Grass 01/15/2020 <0 10     [place] [address] [place] [address] Grass 01/15/2020 <0 10     H124-CbV Noralyn Alexander* 01/15/2020 <0 10     PENICILLIUM CHRYSOGENUM 01/15/2020 <0 10     Cladosporium Herbarum 01/15/2020 <0 10     Aspergillus fumigatus 01/15/2020 <0 10     Mucor Racemosus 01/15/2020 <0 10     Alternaria Alternata 01/15/2020 <0 10     S  HERBARUM 01/15/2020 <0 10     Birch 01/15/2020 <0 10     T007 [place] 01/15/2020 <0 10     [place] 01/15/2020 <0 10     MAPLE/BOX ELDER IGE 01/15/2020 <0 10     [place] 01/15/2020 <0 10     T[address] [place] 01/15/2020 <0 10     MOUNTAIN CEDAR 01/15/2020 <0 10     Short Ragwd(A jocy ) * 01/15/2020 <0 10     Plantain 01/15/2020 <0 10     COMMON PIGWEED 01/15/2020 <0 10     Nettle 01/15/2020 <0 10     Aspergillus fumigatus 3 * 01/15/2020 Negative     Aspergillus fumigatus 2 * 01/15/2020 Negative     Saccharomonospora Viridi* 01/15/2020 Negative     Thermoactinomyces Candid* 01/15/2020 Negative     A  Fumigatus #1 Abs 01/15/2020 Negative     Aspergillus fumigatus 6 * 01/15/2020 Negative     A  Pullulans Abs 01/15/2020 Negative     M  FAENI ABS 01/15/2020 Negative     Ranson Serum Abs 01/15/2020 Negative     T VULGARIS#1 01/15/2020 Negative    Orders Only on 01/15/2020   Component Date Value    White Blood Cell Count 01/15/2020 8 1     Red Blood Cell Count 01/15/2020 5 06     Hemoglobin 01/15/2020 15 2     HCT 01/15/2020 43 5     MCV 01/15/2020 86     MCH 01/15/2020 30 0     MCHC 01/15/2020 34 9     RDW 01/15/2020 14 1     Platelet Count 67/65/1698 261     Neutrophils 01/15/2020 62     Lymphocytes 01/15/2020 24     Monocytes 01/15/2020 9     Eosinophils 01/15/2020 3     Basophils PCT 01/15/2020 1     Neutrophils (Absolute) 01/15/2020 5 1     Lymphocytes (Absolute) 01/15/2020 1 9     Monocytes (Absolute) 01/15/2020 0 7     Eosinophils (Absolute) 01/15/2020 0 3     Basophils ABS 01/15/2020 0 0     Immature Granulocytes 01/15/2020 1     Immature Granulocytes (A* 01/15/2020 0 0     Glucose, Random 01/15/2020 117*    BUN 01/15/2020 23     Creatinine 01/15/2020 1 55*    eGFR Non  01/15/2020 33*    eGFR  01/15/2020 38*    SL AMB BUN/CREATININE RA* 01/15/2020 15     Sodium 01/15/2020 139     Potassium 01/15/2020 4 3     Chloride 01/15/2020 100     CO2 01/15/2020 21     CALCIUM 01/15/2020 9 7     Protein, Total 01/15/2020 7 0     Albumin 01/15/2020 4 5     Globulin, Total 01/15/2020 2 5     Albumin/Globulin Ratio 01/15/2020 1 8     TOTAL BILIRUBIN 01/15/2020 0 3     Alk Phos Isoenzymes 01/15/2020 103     AST 01/15/2020 16     ALT 01/15/2020 16     Specific Gravity 01/15/2020 1 011     Ph 01/15/2020 6 5     Color UA 01/15/2020 Red*    Urine Appearance 01/15/2020 Clear     Leukocyte Esterase 01/15/2020 Trace*    Protein 01/15/2020 2+*    Glucose, 24 HR Urine 01/15/2020 Negative     Ketone, Urine 01/15/2020 Negative     Blood, Urine 01/15/2020 3+*    Bilirubin, Urine 01/15/2020 Negative     Urobilinogen Urine 01/15/2020 0 2     SL AMB NITRITES URINE, Q* 01/15/2020 Negative     Microscopic Examination 01/15/2020 See below:     Urinalysis Reflex 01/15/2020 Comment     SL AMB WBC, URINE 01/15/2020 0-5     RBC, Urine 01/15/2020 >30*    Epithelial Cells (non re* 01/15/2020 0-10     Bacteria, Urine 01/15/2020 Few     Urine Culture Result 01/15/2020 Final report     Result 1 01/15/2020 No growth     Creatinine, Urine 01/15/2020 55 4     Microalbum  ,U,Random 01/15/2020 522 8     Microalb/Creat Ratio 01/15/2020 943 7*    Hemoglobin A1C 01/15/2020 5 9*    25-HYDROXY VIT D 01/15/2020 30 9     TSH 01/15/2020 2 880     Uric Acid 01/15/2020 7 2*    Magnesium, Serum 01/15/2020 1 9    Transcribe Orders on 11/08/2019   Component Date Value    Case Report 11/08/2019                      Value:Non-gynecologic Cytology                          Case: [id number]                                  Authorizing Provider:  Angelo Gallardo MD        Collected:           11/08/2019 1103              Ordering Location:     [address] [place] [address] Road      Received:            11/08/2019 1103                                     Laboratory Services                                                          Pathologist:           Hemalatha Horan MD                                                   Specimen:    Urine, Other                                                                               Final Diagnosis 11/08/2019                      Value: This result contains rich text formatting which cannot be displayed here  Laith Ramirez Description 11/08/2019                      Value: This result contains rich text formatting which cannot be displayed here   Additional Information 11/08/2019                      Value: This result contains rich text formatting which cannot be displayed here  Hospital Outpatient Visit on 11/08/2019   Component Date Value    Protocol Name 11/08/2019 LEXISCAN-SIT     Time In Exercise Phase 11/08/2019 00:03:00     MAX   SYSTOLIC BP 34/46/9195 882     Max Diastolic Bp 07/17/2230 84     Max Heart Rate 11/08/2019 84     Max Predicted Heart Rate 11/08/2019 148     Reason for Termination 11/08/2019 Protocol Complete     Test Indication 11/08/2019 Abnormal ECG     Target Hr Formular 11/08/2019 (220 - Age)*85%    Office Visit on 11/04/2019   Component Date Value    Ventricular Rate 11/04/2019 63     Atrial Rate 11/04/2019 63     TN Interval 11/04/2019 174     QRSD Interval 11/04/2019 84     QT Interval 11/04/2019 402     QTC Interval 11/04/2019 411     P Axis 11/04/2019 64     QRS Axis 11/04/2019 2     T Wave [place] 11/04/2019 102    Appointment on 11/04/2019   Component Date Value    Sodium 11/04/2019 143     Potassium 11/04/2019 4 7     Chloride 11/04/2019 105     CO2 11/04/2019 30     ANION GAP 11/04/2019 8     BUN 11/04/2019 24     Creatinine 11/04/2019 1 56*    Glucose, Fasting 11/04/2019 171*    Calcium 11/04/2019 9 9     eGFR 11/04/2019 33     WBC 11/04/2019 8 32     RBC 11/04/2019 5 17*    Hemoglobin 11/04/2019 14 9     Hematocrit 11/04/2019 46 4*    MCV 11/04/2019 90     MCH 11/04/2019 28 8     MCHC 11/04/2019 32 1     RDW 11/04/2019 13 3     MPV 11/04/2019 10 7     Platelets 44/37/7139 258     nRBC 11/04/2019 0     Neutrophils Relative 11/04/2019 65     Immat GRANS % 11/04/2019 1     Lymphocytes Relative 11/04/2019 22     Monocytes Relative 11/04/2019 8     Eosinophils Relative 11/04/2019 3  Basophils Relative 11/04/2019 1     Neutrophils Absolute 11/04/2019 5 42     Immature Grans Absolute 11/04/2019 0 05     Lymphocytes Absolute 11/04/2019 1 86     Monocytes Absolute 11/04/2019 0 67     Eosinophils Absolute 11/04/2019 0 26     Basophils Absolute 11/04/2019 0 06     Hemoglobin A1C 11/04/2019 5 7     EAG 11/04/2019 117     Urine Culture 11/08/2019 30,000-39,000 cfu/ml      Creatinine, Ur 11/04/2019 144 0     Microalbum  ,U,Random 11/04/2019 1,140 0*    Microalb Creat Ratio 11/04/2019 792*   Procedure visit on 10/31/2019   Component Date Value    LEUKOCYTE ESTERASE,UA 10/31/2019 NEG     NITRITE,UA 10/31/2019 NEG     SL AMB POCT UROBILINOGEN 10/31/2019 NEG     POCT URINE PROTEIN 10/31/2019 +++      PH,UA 10/31/2019 6     BLOOD,UA 10/31/2019 NEG     SPECIFIC GRAVITY,UA 10/31/2019 1 020     KETONES,UA 10/31/2019 Trace     BILIRUBIN,UA 10/31/2019 NEG     GLUCOSE, UA 10/31/2019 NEG      COLOR,UA 10/31/2019 Yellow     CLARITY,UA 10/31/2019 Clear     Clarity, UA 11/08/2019 Slightly Cloudy     Color, UA 11/08/2019 Yellow     Specific Gravity, UA 11/08/2019 1 010     pH, UA 11/08/2019 6 0     Glucose, UA 11/08/2019 Negative     Ketones, UA 11/08/2019 Negative     Blood, UA 11/08/2019 Large*    Protein, UA 11/08/2019 100 (2+)*    Nitrite, UA 11/08/2019 Negative     Bilirubin, UA 11/08/2019 Negative     Urobilinogen, UA 11/08/2019 0 2     Leukocytes, UA 11/08/2019 Negative     WBC, UA 11/08/2019 2-4*    RBC, UA 11/08/2019 30-50*    Bacteria, UA 11/08/2019 Occasional     Epithelial Cells 11/08/2019 Occasional     Color, UA 10/31/2019 Yellow     Clarity, UA 10/31/2019 Clear     Specific Gravity, UA 10/31/2019 1 020     pH, UA 10/31/2019 6 0     Leukocytes, UA 10/31/2019 Negative     Nitrite, UA 10/31/2019 Negative     Protein, UA 10/31/2019 100 (2+)*    Glucose, UA 10/31/2019 Negative     Ketones, UA 10/31/2019 Negative     Urobilinogen, UA 10/31/2019 0 2     Bilirubin, UA 10/31/2019 Negative     Blood, UA 10/31/2019 Moderate*    Case Report 10/31/2019                      Value:Non-gynecologic Cytology                          Case: [id number]                                  Authorizing Provider:  Edwin Sosa MD        Collected:           10/31/2019 1440              Ordering Location:     [address] [place] [address] For        Received:            10/31/2019 1440                                     [facility]                                                         Pathologist:           Evelina Bermudez MD                                                                 Specimen:    Urine, Clean Catch                                                                         Final Diagnosis 10/31/2019                      Value: This result contains rich text formatting which cannot be displayed here  Alberts Gross Description 10/31/2019                      Value: This result contains rich text formatting which cannot be displayed here   Additional Information 10/31/2019                      Value: This result contains rich text formatting which cannot be displayed here      RBC, UA 10/31/2019 None Seen     WBC, UA 10/31/2019 None Seen     Epithelial Cells 10/31/2019 None Seen     Bacteria, UA 10/31/2019 Occasional     Hyaline Casts, UA 10/31/2019 None Seen    Orders Only on 10/25/2019   Component Date Value    Specific Gravity 10/25/2019 1 013     Ph 10/25/2019 6 0     Color UA 10/25/2019 Yellow     Urine Appearance 10/25/2019 Clear     Leukocyte Esterase 10/25/2019 Negative     Protein 10/25/2019 2+*    Glucose, 24 HR Urine 10/25/2019 Negative     Ketone, Urine 10/25/2019 Negative     Blood, Urine 10/25/2019 1+*    Bilirubin, Urine 10/25/2019 Negative     Urobilinogen Urine 10/25/2019 0 2     SL AMB NITRITES URINE, Q* 10/25/2019 Negative     Microscopic Examination 10/25/2019 See below:     Urinalysis Reflex 10/25/2019 Comment     SL AMB WBC, URINE 10/25/2019 0-5     RBC, Urine 10/25/2019 0-2     Epithelial Cells (non re* 10/25/2019 0-10     Bacteria, Urine 10/25/2019 None seen          Imaging  @[id number]@  Recent Results (from the past 75090 hour(s))   NM myocardial perfusion spect (rx stress and/or rest)    [address] [phone]    Rest/Stress Gated SPECT Myocardial Perfusion Imaging After Regadenoson    Patient: Yulia Hamilton  MR number: [id number]  Account number: [de-identified]  : 1945  Age: 69 years  Gender: Female  Status: Outpatient  Location: Stress lab  Height: 58 in  Weight: 163 lb  BP: 206/ 84 mmHg    Allergies: ATORVASTATIN, COLESEVELAM, COLESTIPOL, EZETIMIBE, FENOFIBRATE, POLLEN EXTRACT, ROSUVASTATIN, STATINS    Diagnosis: R94 31 - Abnormal electrocardiogram [ECG] [EKG]    Primary Physician:  Stacie Sheldon DO  Referring Physician:  Stacie Sheldon DO  Group:  [facility]  Other:  John Salinas MS, CCT  Interpreting Physician:  Munira Patino MD    INDICATIONS: Detection of coronary artery disease  Pre-operative risk assessment  HISTORY: The patient is a 69year old  female  Chest pain status: no chest pain  Coronary artery disease risk factors: dyslipidemia, hypertension, smoking, diabetes mellitus, and post-menopausal state  Cardiovascular history:  peripheral vascular occlusive disease, stroke, and transient ischemic attack  Co-morbidity: history of renal disease and obesity  GERD, renal and celiac stenosis  Medications: a beta blocker, a calcium channel blocker, an ACE  inhibitor/ARB, clopidogrel, and an antihypertensive agent  Previous test results: abnormal ECG and hyperlipidemia  PHYSICAL EXAM: Baseline physical exam screening: normal     REST ECG: Normal sinus rhythm  Nonspecific ST and T wave abnormalities were present  PROCEDURE: The study was performed in the the Stress lab   A regadenoson infusion pharmacologic stress test was performed  Gated SPECT myocardial perfusion imaging was performed after stress and at rest  Systolic blood pressure was 206  mmHg, at the start of the study  Diastolic blood pressure was 84 mmHg, at the start of the study  The heart rate was 63 bpm, at the start of the study  Regadenoson protocol:  HR bpm SBP mmHg DBP mmHg Symptoms Rhythm/conduct  Baseline 63 206 84 none NSR, no ectopy  Immediate 83 126 84 mild dyspnea, nausea NSR, no ectopy  1 min 76 -- -- none NSR, no ectopy  2 min 71 184 78 none NSR, no ectopy  Rest SpO2 98, Peak Stress SpO2 99    STRESS SUMMARY: Duration of pharmacologic stress was 3 min  Maximal heart rate during stress was 83 bpm  The rate-pressure product for the peak heart rate and blood pressure was 45329  There was no chest pain during stress  The stress test  was terminated due to protocol completion  The stress ECG was negative for ischemia and normal     ISOTOPE ADMINISTRATION:  Resting isotope administration Stress isotope administration  Agent Tetrofosmin Tetrofosmin  Dose 9 9 mCi 29 6 mCi  Date 11/08/2019 11/08/2019  Injection-image interval 30 min 30 min    The radiopharmaceutical was injected at the peak effect of pharmacologic stress  MYOCARDIAL PERFUSION IMAGING:  The image quality was good  PERFUSION DEFECTS:  -  There was a small, mildly severe, partially reversible myocardial perfusion defect of anterior and inferior wall  -  There was of the inferior and anterior wall  GATED SPECT:  The calculated left ventricular ejection fraction was 61 %  There was no left ventricular regional abnormality  SUMMARY:  -  Stress results: There was no chest pain during stress  -  ECG conclusions: The stress ECG was negative for ischemia and normal   -  Perfusion imaging: There was a small, mildly severe, partially reversible myocardial perfusion defect of anterior and inferior wall There was of the inferior and anterior wall    -  Gated SPECT: The calculated left ventricular ejection fraction was 61 %  There was no left ventricular regional abnormality  Prepared and signed by    Lalitha Lechuga MD  Signed 2019 17:53:08     Echo complete with contrast if indicated    Narrative    [address] [place] [id number]  [phone]    Transthoracic Echocardiogram  2D, M-mode, Doppler, and Color Doppler    Study date:  2019    Patient: Cee Zimmerman  MR number: [id number]  Account number: [de-identified]  : 15-Juma-1945  Age: 69 years  Gender: Female  Status: Outpatient  Location: Echo lab  Height: 58 in  Weight: 163 lb  BP: 216/ 86 mmHg    Indications: Abnormal EKG, Assess left ventricular function  Diagnoses: R94 31 - Abnormal electrocardiogram [ECG] [EKG]    Sonographer:   Medical Rolfe Avani Bates  Referring Physician:  Jw Nunez DO  Group:  [facility]  Interpreting Physician:  Lalitha Lechuga MD    SUMMARY    LEFT VENTRICLE:  Systolic function was normal  Ejection fraction was estimated to be 60 %  There were no regional wall motion abnormalities  Wall thickness was mildly increased  Doppler parameters were consistent with abnormal left ventricular relaxation (grade 1 diastolic dysfunction)  Doppler parameters were consistent with elevated ventricular end-diastolic filling pressure  LEFT ATRIUM:  The atrium was mildly dilated  MITRAL VALVE:  There was mild annular calcification  There was mild regurgitation  TRICUSPID VALVE:  There was trace regurgitation  AORTA:  The root exhibited normal size and mild fibrocalcific change  HISTORY: Consistent with transient ischemic attack or stroke  PRIOR HISTORY: CKD, Risk factors: hypertension, diabetes, and hypercholesterolemia  PROCEDURE: The procedure was performed in the echo lab  This was a routine study  The transthoracic approach was used   The study included complete 2D imaging, M-mode, complete spectral Doppler, and color Doppler  The heart rate was 60 bpm,  at the start of the study  Images were obtained from the parasternal, apical, subcostal, and suprasternal notch acoustic windows  Image quality was adequate  LEFT VENTRICLE: Size was normal  Systolic function was normal  Ejection fraction was estimated to be 60 %  There were no regional wall motion abnormalities  Wall thickness was mildly increased  No evidence of apical thrombus  DOPPLER:  Doppler parameters were consistent with abnormal left ventricular relaxation (grade 1 diastolic dysfunction)  Doppler parameters were consistent with elevated ventricular end-diastolic filling pressure  RIGHT VENTRICLE: The size was normal  Systolic function was normal  Wall thickness was normal     LEFT ATRIUM: The atrium was mildly dilated  RIGHT ATRIUM: Size was normal     MITRAL VALVE: There was mild annular calcification  Valve structure was normal  There was mild-moderate calcification  There was mildly reduced leaflet separation  DOPPLER: The transmitral velocity was within the normal range  There was no  evidence for stenosis  There was mild regurgitation  AORTIC VALVE: The valve was trileaflet  Leaflets exhibited normal thickness and normal cuspal separation  DOPPLER: Transaortic velocity was within the normal range  There was no evidence for stenosis  There was no significant  regurgitation  TRICUSPID VALVE: The valve structure was normal  There was normal leaflet separation  DOPPLER: The transtricuspid velocity was within the normal range  There was no evidence for stenosis  There was trace regurgitation  PULMONIC VALVE: Leaflets exhibited normal thickness, no calcification, and normal cuspal separation  DOPPLER: The transpulmonic velocity was within the normal range  There was no significant regurgitation  PERICARDIUM: There was no pericardial effusion  The pericardium was normal in appearance      AORTA: The root exhibited normal size and mild fibrocalcific change  SYSTEMIC VEINS: IVC: The inferior vena cava was normal in size  SYSTEM MEASUREMENT TABLES    2D  %FS: 30 9 %  Ao Diam: 2 7 cm  EDV(Teich): 80 7 ml  EF(Teich): 58 9 %  ESV(Teich): 33 1 ml  IVSd: 1 1 cm  LA Area: 17 cm2  LA Diam: 3 9 cm  LVEDV MOD A4C: 115 5 ml  LVEF MOD A4C: 60 5 %  LVESV MOD A4C: 45 6 ml  LVIDd: 4 2 cm  LVIDs: 2 9 cm  LVLd A4C: 8 cm  LVLs A4C: 6 7 cm  LVPWd: 1 1 cm  RA Area: 15 1 cm2  RVIDd: 2 9 cm  SV MOD A4C: 69 9 ml  SV(Teich): 47 5 ml    MM  TAPSE: 2 1 cm    PW  E': 0 m/s  E/E': 27 9  MV A Carlos A: 1 5 m/s  MV Dec Travis: 3 4 m/s2  MV DecT: 293 8 ms  MV E Carlos A: 1 m/s  MV E/A Ratio: 0 7  MV PHT: 85 2 ms  MVA By PHT: 2 6 cm2    [facility]    Prepared and electronically signed by    Franci Almaguer MD  Signed [id number] 17:32:58     ECG 12 lead   Result Value    Ventricular Rate 63    Atrial Rate 63    MN Interval 174    QRSD Interval 84    QT Interval 402    QTC Interval 411    P Axis 64    QRS Axis 2    T Wave Axis 102    Narrative    Normal sinus rhythm  Minimal voltage criteria for LVH, may be normal variant  Possible Inferior infarct , age undetermined  Anterior infarct , age undetermined  Confirmed by Natalie Salazar ([id number]) on 11/4/2019 12:51:00 PM     No orders to display     No results found for this or any previous visit  Physical Exam   Constitutional: She is oriented to person, place, and time  She appears well-developed  HENT:   Right Ear: External ear normal    Left Ear: External ear normal    Eyes: Right eye exhibits no discharge  Left eye exhibits no discharge  No scleral icterus  Neck: Carotid bruit is not present  No tracheal deviation present  No thyroid mass and no thyromegaly present  Cardiovascular: Normal rate, regular rhythm, normal heart sounds and intact distal pulses  Exam reveals no gallop and no friction rub  No murmur heard  Pulmonary/Chest: No respiratory distress  She has no wheezes  She has no rales  Abdominal: Bowel sounds are normal  She exhibits abdominal bruit  She exhibits no distension, no pulsatile liver, no fluid wave, no ascites and no pulsatile midline mass  Musculoskeletal: She exhibits no edema  Lymphadenopathy:     She has no cervical adenopathy  Neurological: She is alert and oriented to person, place, and time  Coordination normal    Psychiatric: She has a normal mood and affect  Her behavior is normal  Judgment and thought content normal    Nursing note and vitals reviewed

## 2020-01-25 ENCOUNTER — HOSPITAL ENCOUNTER (OUTPATIENT)
Dept: CT IMAGING | Facility: HOSPITAL | Age: 75
Discharge: HOME | End: 2020-01-25
Attending: INTERNAL MEDICINE
Payer: COMMERCIAL

## 2020-01-25 ENCOUNTER — HOSPITAL ENCOUNTER (OUTPATIENT)
Dept: RADIOLOGY | Facility: HOSPITAL | Age: 75
Discharge: HOME | End: 2020-01-25
Attending: INTERNAL MEDICINE
Payer: COMMERCIAL

## 2020-01-25 DIAGNOSIS — R09.81 SINUS CONGESTION: ICD-10-CM

## 2020-01-25 DIAGNOSIS — R93.89 ABNORMAL CT OF THE CHEST: ICD-10-CM

## 2020-01-25 PROCEDURE — 70220 X-RAY EXAM OF SINUSES: CPT

## 2020-01-25 PROCEDURE — 71250 CT THORAX DX C-: CPT

## 2020-01-30 ENCOUNTER — TELEPHONE (OUTPATIENT)
Dept: PULMONOLOGY | Facility: CLINIC | Age: 75
End: 2020-01-30

## 2020-02-04 ENCOUNTER — TELEPHONE (OUTPATIENT)
Dept: INTERNAL MEDICINE CLINIC | Facility: CLINIC | Age: 75
End: 2020-02-04

## 2020-02-04 NOTE — TELEPHONE ENCOUNTER
----- Message from Pablo Hardin DO sent at 1/23/2020  8:33 AM EST -----  Patient now agrees to cardiac catheterization she needs to be seen by Dr Alfie Amor for as soon as possible so she can get cardiac clearance for her bladder tumor

## 2020-02-05 ENCOUNTER — OFFICE VISIT (OUTPATIENT)
Dept: INTERNAL MEDICINE CLINIC | Facility: CLINIC | Age: 75
End: 2020-02-05
Payer: COMMERCIAL

## 2020-02-05 VITALS
WEIGHT: 168 LBS | BODY MASS INDEX: 35.26 KG/M2 | DIASTOLIC BLOOD PRESSURE: 80 MMHG | HEIGHT: 58 IN | RESPIRATION RATE: 16 BRPM | TEMPERATURE: 97.9 F | HEART RATE: 76 BPM | SYSTOLIC BLOOD PRESSURE: 160 MMHG | OXYGEN SATURATION: 96 %

## 2020-02-05 DIAGNOSIS — N18.30 KIDNEY DISEASE, CHRONIC, STAGE III (MODERATE, EGFR 30-59 ML/MIN) (HCC): ICD-10-CM

## 2020-02-05 DIAGNOSIS — I15.2 HYPERTENSION ASSOCIATED WITH DIABETES (HCC): ICD-10-CM

## 2020-02-05 DIAGNOSIS — D49.4 BLADDER TUMOR: ICD-10-CM

## 2020-02-05 DIAGNOSIS — I10 HYPERTENSION, UNSPECIFIED TYPE: ICD-10-CM

## 2020-02-05 DIAGNOSIS — I70.201 FEMORAL ARTERY STENOSIS, RIGHT (HCC): ICD-10-CM

## 2020-02-05 DIAGNOSIS — F17.200 TOBACCO USE DISORDER: ICD-10-CM

## 2020-02-05 DIAGNOSIS — Z00.00 INITIAL MEDICARE ANNUAL WELLNESS VISIT: Primary | ICD-10-CM

## 2020-02-05 DIAGNOSIS — E11.59 HYPERTENSION ASSOCIATED WITH DIABETES (HCC): ICD-10-CM

## 2020-02-05 DIAGNOSIS — K55.1 SUPERIOR MESENTERIC ARTERY STENOSIS (HCC): ICD-10-CM

## 2020-02-05 LAB
HCV AB S/CO SERPL IA: <0.1 S/CO RATIO (ref 0–0.9)
SL AMB INTERPRETATION: NORMAL

## 2020-02-05 PROCEDURE — 1125F AMNT PAIN NOTED PAIN PRSNT: CPT | Performed by: INTERNAL MEDICINE

## 2020-02-05 PROCEDURE — 1160F RVW MEDS BY RX/DR IN RCRD: CPT | Performed by: INTERNAL MEDICINE

## 2020-02-05 PROCEDURE — 3008F BODY MASS INDEX DOCD: CPT | Performed by: INTERNAL MEDICINE

## 2020-02-05 PROCEDURE — 3079F DIAST BP 80-89 MM HG: CPT | Performed by: INTERNAL MEDICINE

## 2020-02-05 PROCEDURE — 3077F SYST BP >= 140 MM HG: CPT | Performed by: INTERNAL MEDICINE

## 2020-02-05 PROCEDURE — G0438 PPPS, INITIAL VISIT: HCPCS | Performed by: INTERNAL MEDICINE

## 2020-02-05 PROCEDURE — 3066F NEPHROPATHY DOC TX: CPT | Performed by: INTERNAL MEDICINE

## 2020-02-05 PROCEDURE — 4040F PNEUMOC VAC/ADMIN/RCVD: CPT | Performed by: INTERNAL MEDICINE

## 2020-02-05 PROCEDURE — 1170F FXNL STATUS ASSESSED: CPT | Performed by: INTERNAL MEDICINE

## 2020-02-05 PROCEDURE — 99214 OFFICE O/P EST MOD 30 MIN: CPT | Performed by: INTERNAL MEDICINE

## 2020-02-05 NOTE — PROGRESS NOTES
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       Endocrine    Hypertension associated with diabetes [facility])       Cardiovascular and Mediastinum    Hypertension    Femoral artery stenosis, right (HCC) 50-75% stenosis in the common femoral artery  Genitourinary    Kidney disease, chronic, stage III (moderate, EGFR 30-59 ml/min) (HCC)    Bladder tumor       Other    Tobacco use disorder    Relevant Medications    nicotine (NICOTROL) 10 MG inhaler    Initial Medicare annual wellness visit - Primary           Preventive health issues were discussed with patient, and age appropriate screening tests were ordered as noted in patient's After Visit Summary  Personalized health advice and appropriate referrals for health education or preventive services given if needed, as noted in patient's After Visit Summary  History of Present Illness:     Patient presents for Medicare Annual Wellness visit    Patient Care Team:  Deandre Kenney DO as PCP - General (Internal Medicine)  Ronold Oppenheim, MD Lyna Galley as Nurse Practitioner (Internal Medicine)  Anisha Mari MD (Urology)     Problem List:     Patient Active Problem List   Diagnosis    Spinal stenosis    Basilar artery stenosis    Breast mass    Cerebrovascular accident (CVA) ([address] )    Chronic GERD    Kidney disease, chronic, stage III (moderate, EGFR 30-59 ml/min) ([address] )    Claudication in peripheral vascular disease ([address] )    Depression with anxiety    Type 2 diabetes mellitus with diabetic nephropathy ([address] )    Endometriosis    Gout    Hx-TIA (transient ischemic attack)    Hypercholesteremia    Hyperlipidemia associated with type 2 diabetes mellitus ([address] )    Hypertension    Hypertension associated with diabetes ([address] )    Osteoarthritis    Obesity (BMI 30-39  9)    Polyneuropathy    Right renal artery stenosis (HCC)    Tobacco use disorder    Vertebrobasilar artery syndrome    Vitamin D deficiency    Statin intolerance    Lumbar disc herniation    Pain of left lower extremity    Abnormal EKG    Spondylosis of lumbar region without myelopathy or radiculopathy    Aortoiliac stenosis, left (HCC)    Hypokalemia    Acute drug-induced gout of left foot    Gross hematuria on tissue paper after wiping    Decreased pulses in feet    Refused influenza vaccine    Hypercalcemia    Lumbosacral spondylosis without myelopathy    Neurodermatitis    Other proteinuria    Obesity with body mass index 30 or greater    Hyperlipidemia, unspecified    Herniated lumbar intervertebral disc    Atherosclerosis of renal artery ([facility])    Bladder tumor    Celiac artery stenosis ([facility]) >70% stenosis in the celiac trunk    Abnormal CT of the chest suspicious for an infectious or inflammatory bronchiolitis   Positive cardiac stress test  small, mildly severe, partially reversible myocardial perfusion defect of anterior and inferior wall     Femoral artery stenosis, right ([facility]) 50-75% stenosis in the common femoral artery      Superior mesenteric artery stenosis ([facility]) left    Immunization not carried out because of patient refusal    Median arcuate ligament syndrome ([address] )    Abdominal bruit    Initial Medicare annual wellness visit      Past Medical and Surgical History:     Past Medical History:   Diagnosis Date    Arthritis     Coronary artery disease     Diabetes mellitus ([address] )     Endometriosis     High cholesterol     Hypertension     Kidney disease, chronic, stage III (moderate, EGFR 30-59 ml/min) ([facility])     Lumbar disc herniation     Peripheral vascular disease ([address] )     Shoulder injury     left    Spinal stenosis     Stroke [facility])      Past Surgical History:   Procedure Laterality Date    FRACTURE SURGERY      OVARIAN CYST SURGERY      ROTATOR CUFF REPAIR Left     TONSILLECTOMY        Family History:     Family History   Problem Relation Age of Onset    Hypertension Mother     Heart disease Mother         Valvular    Hyperlipidemia Mother     Hypertension Father     Heart defect Father         Cardiomegaly    Stroke Sister         Cerebrovascular Accident    Arthritis Brother     Other Brother         Back Disorder      Social History:     Social History     Socioeconomic History    Marital status: /Civil Union     Spouse name: None    Number of children: None    Years of education: None    Highest education level: None   Occupational History    None   Social Needs    Financial resource strain: None    Food insecurity:     Worry: None     Inability: None    Transportation needs:     Medical: None     Non-medical: None   Tobacco Use    Smoking status: Current Every Day Smoker     Packs/day: 1 00     Types: Cigarettes    Smokeless tobacco: Never Used   Substance and Sexual Activity    Alcohol use: No    Drug use: No    Sexual activity: Not Currently     Partners: Male   Lifestyle    Physical activity:     Days per week: None     Minutes per session: None    Stress: None   Relationships    Social connections:     Talks on phone: None     Gets together: None     Attends Cheondoism service: None     Active member of club or organization: None     Attends meetings of clubs or organizations: None     Relationship status: None    Intimate partner violence:     Fear of current or ex partner: None     Emotionally abused: None     Physically abused: None     Forced sexual activity: None   Other Topics Concern    None   Social History Narrative    Occasional Caffeine Consumption       Medications and Allergies:     Current Outpatient Medications   Medication Sig Dispense Refill    amLODIPine (NORVASC) 5 mg tablet Take 1 tablet (5 mg total) by mouth 2 (two) times a day 60 tablet 11    Cholecalciferol (VITAMIN D3) 1000 units CAPS Take 1 capsule by mouth every other day       clopidogrel (PLAVIX) 75 mg tablet Take 1 tablet (75 mg total) by mouth daily 90 tablet 1    Icosapent Ethyl (VASCEPA) 1 g CAPS 2 cap bid with meals 360 capsule 2    labetalol (NORMODYNE) 200 mg tablet Take 1 tablet (200 mg total) by mouth 2 (two) times a day 180 tablet 2    losartan (COZAAR) 100 MG tablet Take 100 mg by mouth every morning       losartan (COZAAR) 50 mg tablet Take 1 tablet by mouth daily        traMADol (ULTRAM) 50 mg tablet Take 1 tablet (50 mg total) by mouth every 8 (eight) hours as needed for moderate pain 60 tablet 1    nicotine (NICOTROL) 10 MG inhaler Inhale 1 puff as needed for smoking cessation 42 each 5     No current facility-administered medications for this visit        Allergies   Allergen Reactions    Atorvastatin Dizziness and Myalgia     Other reaction(s): Dizziness/Myalgia    Colesevelam      Other reaction(s): leg pains    Colestipol      Other reaction(s): Swelling, Itching    Ezetimibe      Other reaction(s): heartburn  Other reaction(s): Heart Burn    Fenofibrate      Other reaction(s): blood in urine  hx  Kidney Failure    Pollen Extract     Rosuvastatin      Other reaction(s): Leg Cramping    Statins Myalgia      Immunizations:     Immunization History   Administered Date(s) Administered    Pneumococcal Conjugate 13-Valent 09/21/2016    Pneumococcal Polysaccharide PPV23 12/14/2011      Health Maintenance:         Topic Date Due    Hepatitis C Screening  1945    MAMMOGRAM  02/05/2021 (Originally 1945)    CRC Screening: Cologuard  11/19/2022         Topic Date Due    DTaP,Tdap,and Td Vaccines (1 - Tdap) 06/15/1958    Hepatitis B Vaccine (1 of 3 - Risk 3-dose series) 06/15/1966    Pneumococcal Vaccine: 65+ Years (2 of 2 - PPSV23) 09/21/2017      Medicare Health Risk Assessment:     /80 (BP Location: Right arm, Patient Position: Sitting)   Pulse 76   Temp 97 9 °F (36 6 °C)   Resp 16   Ht 4' 10" (1 473 m)   Wt 76 2 kg (168 lb)   LMP  (LMP Unknown)   SpO2 96%   BMI 35 11 kg/m²      Joe Zuleyma is here for her Initial Wellness visit  Last Medicare Wellness visit information reviewed, patient interviewed and updates made to the record today  Health Risk Assessment:   Patient rates overall health as good  Patient feels that their physical health rating is same  Eyesight was rated as same  Hearing was rated as same  Patient feels that their emotional and mental health rating is much better  Pain experienced in the last 7 days has been some  Patient's pain rating has been 6/10  Patient states that she has experienced no weight loss or gain in last 6 months  Depression Screening:   PHQ-2 Score: 6  PHQ-9 Score: 13      Fall Risk Screening: In the past year, patient has experienced: no history of falling in past year      Urinary Incontinence Screening:   Patient has not leaked urine accidently in the last six months  Home Safety:  Patient has trouble with stairs inside or outside of their home  Patient has working smoke alarms and has working carbon monoxide detector  Home safety hazards include: none  Nutrition:   Current diet is Regular  Medications:   Patient is currently taking over-the-counter supplements  OTC medications include: see medication list  Patient is able to manage medications  Activities of Daily Living (ADLs)/Instrumental Activities of Daily Living (IADLs):   Walk and transfer into and out of bed and chair?: Yes  Dress and groom yourself?: Yes    Bathe or shower yourself?: Yes    Feed yourself? Yes  Do your laundry/housekeeping?: Yes  Manage your money, pay your bills and track your expenses?: Yes  Make your own meals?: Yes    Do your own shopping?: Yes    Previous Hospitalizations:   Any hospitalizations or ED visits within the last 12 months?: No      Advance Care Planning:   Living will: Yes    Durable POA for healthcare:  Yes    Advanced directive: Yes    Advanced directive counseling given: Yes    Five wishes given: Yes    Patient declined ACP directive: No    End of Life Decisions reviewed with patient: Yes    Provider agrees with end of life decisions: Yes      Cognitive Screening:   Provider or family/friend/caregiver concerned regarding cognition?: No    PREVENTIVE SCREENINGS      Cardiovascular Screening:    General: Screening Not Indicated, History Lipid Disorder and Screening Current      Diabetes Screening:     General: Screening Not Indicated, History Diabetes and Screening Current      Colorectal Cancer Screening:     General: Screening Current      Breast Cancer Screening:     General: Patient Declines      Cervical Cancer Screening:    General: Screening Not Indicated      Osteoporosis Screening:    General: Patient Declines      Abdominal Aortic Aneurysm (AAA) Screening:        General: Screening Current      Lung Cancer Screening:     General: Screening Current      Hepatitis C Screening:    General: Patient Declines      Jayson Elias,

## 2020-02-05 NOTE — LETTER
February 5, 2020     Amita Brandon MD  [address]    Patient: Reji Thomas   YOB: 1945   Date of Visit: 2/5/2020       Dear Dr Jennifer Gibson: Thank you for referring Adrienne Rodarte to me for evaluation  Below are my notes for this consultation  If you have questions, please do not hesitate to call me  I look forward to following your patient along with you  Sincerely,        Isa Roman DO        CC: MD Isa Bo DO  2/5/2020  4:38 PM  Incomplete  Assessment/Plan:    1 patient with ground-glass nodule 7 mm in the right upper lobe has appoint with Dr Darryle Dasen to decide whether biopsy or follow-up is indicated  2  Patient with failed stress test is seeing Dr Kuldeep Lubin for hopefully to schedule catheterization and talk about PSK9 medications such as Repatha or Praluent  3  Tobacco abuse will use inhaler to take as needed and wean off of cigarettes and then wean off the inhaler  4  Chronic kidney disease is mild has good follow-up with Dr Jennifer Gibson  5  Diffuse vascular disease different blood pressure right left arm tobacco cessation is encouraged  6  Type 2 diabetes will see her back in 2 months  7  Chronic bronchitis continued follow-up with pulmonary       Diagnoses and all orders for this visit:    Initial Medicare annual wellness visit    Tobacco use disorder  -     nicotine (NICOTROL) 10 MG inhaler; Inhale 1 puff as needed for smoking cessation        The patient was counseled regarding instructions for management, risk factor reductions, patient and family education,impressions, risks and benefits of treatment options, side effects of medications, importance of compliance with treatment  The treatment plan was reviewed with the patient/guardian and patient/guardian understands and agrees with the treatment plan              Current Outpatient Medications:     amLODIPine (NORVASC) 5 mg tablet, Take 1 tablet (5 mg total) by mouth 2 (two) times a day, Disp: 60 tablet, Rfl: 11    Cholecalciferol (VITAMIN D3) 1000 units CAPS, Take 1 capsule by mouth every other day , Disp: , Rfl:     clopidogrel (PLAVIX) 75 mg tablet, Take 1 tablet (75 mg total) by mouth daily, Disp: 90 tablet, Rfl: 1    Icosapent Ethyl (VASCEPA) 1 g CAPS, 2 cap bid with meals, Disp: 360 capsule, Rfl: 2    labetalol (NORMODYNE) 200 mg tablet, Take 1 tablet (200 mg total) by mouth 2 (two) times a day, Disp: 180 tablet, Rfl: 2    losartan (COZAAR) 100 MG tablet, Take 100 mg by mouth every morning , Disp: , Rfl:     losartan (COZAAR) 50 mg tablet, Take 1 tablet by mouth daily  , Disp: , Rfl:     traMADol (ULTRAM) 50 mg tablet, Take 1 tablet (50 mg total) by mouth every 8 (eight) hours as needed for moderate pain, Disp: 60 tablet, Rfl: 1    nicotine (NICOTROL) 10 MG inhaler, Inhale 1 puff as needed for smoking cessation, Disp: 42 each, Rfl: 5    Subjective:      Patient ID: Mike Craig is a 69 y o  female  Chronic cough more than 5 years, sinus pressure      The following portions of the patient's history were reviewed and updated as appropriate:   She has a past medical history of Arthritis, Coronary artery disease, Diabetes mellitus ([address] ), Endometriosis, High cholesterol, Hypertension, Kidney disease, chronic, stage III (moderate, EGFR 30-59 ml/min) ([facility]), Lumbar disc herniation, Peripheral vascular disease ([address] ), Shoulder injury, Spinal stenosis, and Stroke ([address] )  ,  does not have any pertinent problems on file  ,   has a past surgical history that includes Rotator cuff repair (Left); Tonsillectomy; Ovarian cyst surgery; and Fracture surgery  ,  family history includes Arthritis in her brother; Heart defect in her father; Heart disease in her mother; Hyperlipidemia in her mother; Hypertension in her father and mother; Other in her brother; Stroke in her sister  ,   reports that she has been smoking cigarettes   She has been smoking about 1 00 pack per day  She has never used smokeless tobacco  She reports that she does not drink alcohol or use drugs  ,  is allergic to atorvastatin; colesevelam; colestipol; ezetimibe; fenofibrate; pollen extract; rosuvastatin; and statins       Review of Systems   Constitutional: Negative for appetite change, chills, fatigue, fever and unexpected weight change  HENT: Positive for postnasal drip and sinus pressure  Negative for congestion, ear pain, facial swelling, hearing loss, mouth sores, nosebleeds, rhinorrhea, sinus pain, sore throat, trouble swallowing and voice change  Eyes: Negative for pain, discharge, redness and visual disturbance  Respiratory: Positive for cough  Negative for apnea, chest tightness, shortness of breath, wheezing and stridor  Cardiovascular: Negative for chest pain, palpitations and leg swelling  Gastrointestinal: Negative for abdominal distention, abdominal pain, blood in stool, constipation, diarrhea and vomiting  Endocrine: Negative for cold intolerance, heat intolerance, polydipsia, polyphagia and polyuria  Genitourinary: Negative for difficulty urinating, dysuria, flank pain, frequency, genital sores, hematuria and urgency  Musculoskeletal: Negative for arthralgias and back pain  Skin: Negative for rash and wound  Allergic/Immunologic: Negative for environmental allergies, food allergies and immunocompromised state  Neurological: Negative for dizziness, tremors, seizures, syncope, facial asymmetry, speech difficulty, weakness, light-headedness, numbness and headaches  Hematological: Negative for adenopathy  Does not bruise/bleed easily  Psychiatric/Behavioral: Negative for agitation, behavioral problems, dysphoric mood, hallucinations, self-injury, sleep disturbance and suicidal ideas  The patient is not hyperactive            Objective:  /80 (BP Location: Right arm, Patient Position: Sitting)   Pulse 76   Temp 97 9 °F (36 6 °C)   Resp 16   Ht 4' 10" (1 473 m) Wt 76 2 kg (168 lb)   LMP  (LMP Unknown)   SpO2 96%   BMI 35 11 kg/m²      Lab Review  Orders Only on 01/15/2020   Component Date Value    Class Description 01/15/2020 Comment     D  PTERONYSSINUS IGE 01/15/2020 <0 10     D  FARINAE 01/15/2020 <0 10     Cat Dander 01/15/2020 <0 10     DOG DANDER IGE 01/15/2020 <0 10     BERMUDA GRASS IGE 01/15/2020 <0 10     Blue [place] 01/15/2020 <0 10     Nima Grass 01/15/2020 <0 10     [address] [place] 01/15/2020 <0 10     E690-TnW Aretta Hasting* 01/15/2020 <0 10     PENICILLIUM CHRYSOGENUM 01/15/2020 <0 10     Cladosporium Herbarum 01/15/2020 <0 10     Aspergillus fumigatus 01/15/2020 <0 10     Mucor Racemosus 01/15/2020 <0 10     Alternaria Alternata 01/15/2020 <0 10     S  HERBARUM 01/15/2020 <0 10     Birch 01/15/2020 <0 10     T007 [place] 01/15/2020 <0 10     [place] 01/15/2020 <0 10     MAPLE/BOX ELDER IGE 01/15/2020 <0 10     [place] 01/15/2020 <0 10     T070 [place] 01/15/2020 <0 10     MOUNTAIN CEDAR 01/15/2020 <0 10     Short Ragwd(A jocy ) * 01/15/2020 <0 10     Plantain 01/15/2020 <0 10     COMMON PIGWEED 01/15/2020 <0 10     Nettle 01/15/2020 <0 10     Aspergillus fumigatus 3 * 01/15/2020 Negative     Aspergillus fumigatus 2 * 01/15/2020 Negative     Saccharomonospora Viridi* 01/15/2020 Negative     Thermoactinomyces Candid* 01/15/2020 Negative     A  Fumigatus #1 Abs 01/15/2020 Negative     Aspergillus fumigatus 6 * 01/15/2020 Negative     A  Pullulans Abs 01/15/2020 Negative     M  FAENI ABS 01/15/2020 Negative     [place] Serum Abs 01/15/2020 Negative     T VULGARIS#1 01/15/2020 Negative    Orders Only on 01/15/2020   Component Date Value    White Blood Cell Count 01/15/2020 8 1     Red Blood Cell Count 01/15/2020 5 06     Hemoglobin 01/15/2020 15 2     HCT 01/15/2020 43 5     MCV 01/15/2020 86     MCH 01/15/2020 30 0     MCHC 01/15/2020 34 9     RDW 01/15/2020 14 1     Platelet Count 63/67/6971 261     Neutrophils 01/15/2020 62     Lymphocytes 01/15/2020 24     Monocytes 01/15/2020 9     Eosinophils 01/15/2020 3     Basophils PCT 01/15/2020 1     Neutrophils (Absolute) 01/15/2020 5 1     Lymphocytes (Absolute) 01/15/2020 1 9     Monocytes (Absolute) 01/15/2020 0 7     Eosinophils (Absolute) 01/15/2020 0 3     Basophils ABS 01/15/2020 0 0     Immature Granulocytes 01/15/2020 1     Immature Granulocytes (A* 01/15/2020 0 0     Glucose, Random 01/15/2020 117*    BUN 01/15/2020 23     Creatinine 01/15/2020 1 55*    eGFR Non  01/15/2020 33*    eGFR  01/15/2020 38*    SL AMB BUN/CREATININE RA* 01/15/2020 15     Sodium 01/15/2020 139     Potassium 01/15/2020 4 3     Chloride 01/15/2020 100     CO2 01/15/2020 21     CALCIUM 01/15/2020 9 7     Protein, Total 01/15/2020 7 0     Albumin 01/15/2020 4 5     Globulin, Total 01/15/2020 2 5     Albumin/Globulin Ratio 01/15/2020 1 8     TOTAL BILIRUBIN 01/15/2020 0 3     Alk Phos Isoenzymes 01/15/2020 103     AST 01/15/2020 16     ALT 01/15/2020 16     Specific Gravity 01/15/2020 1 011     Ph 01/15/2020 6 5     Color UA 01/15/2020 Red*    Urine Appearance 01/15/2020 Clear     Leukocyte Esterase 01/15/2020 Trace*    Protein 01/15/2020 2+*    Glucose, 24 HR Urine 01/15/2020 Negative     Ketone, Urine 01/15/2020 Negative     Blood, Urine 01/15/2020 3+*    Bilirubin, Urine 01/15/2020 Negative     Urobilinogen Urine 01/15/2020 0 2     SL AMB NITRITES URINE, Q* 01/15/2020 Negative     Microscopic Examination 01/15/2020 See below:     Urinalysis Reflex 01/15/2020 Comment     SL AMB WBC, URINE 01/15/2020 0-5     RBC, Urine 01/15/2020 >30*    Epithelial Cells (non re* 01/15/2020 0-10     Bacteria, Urine 01/15/2020 Few     Urine Culture Result 01/15/2020 Final report     Result 1 01/15/2020 No growth     Creatinine, Urine 01/15/2020 55 4     Microalbum  ,U,Random 01/15/2020 522 8     Microalb/Creat Ratio 01/15/2020 943 7*    Hemoglobin A1C 01/15/2020 5 9*    25-HYDROXY VIT D 01/15/2020 30 9     TSH 01/15/2020 2 880     Uric Acid 01/15/2020 7 2*    Magnesium, Serum 01/15/2020 1 9    Transcribe Orders on 11/08/2019   Component Date Value    Case Report 11/08/2019                      Value:Non-gynecologic Cytology                          Case: [id number]                                  Authorizing Provider:  Sylvie Trent MD        Collected:           11/08/2019 1103              Ordering Location:     [address] [place] [address]      Received:            11/08/2019 1103                                     Laboratory Services                                                          Pathologist:           Remington Torres MD                                                   Specimen:    Urine, Other                                                                               Final Diagnosis 11/08/2019                      Value: This result contains rich text formatting which cannot be displayed here  Ange Splinter Gross Description 11/08/2019                      Value: This result contains rich text formatting which cannot be displayed here   Additional Information 11/08/2019                      Value: This result contains rich text formatting which cannot be displayed here  Hospital Outpatient Visit on 11/08/2019   Component Date Value    Protocol Name 11/08/2019 LEXISCAN-SIT     Time In Exercise Phase 11/08/2019 00:03:00     MAX   SYSTOLIC BP 83/83/2758 017     Max Diastolic Bp 51/58/7661 84     Max Heart Rate 11/08/2019 84     Max Predicted Heart Rate 11/08/2019 148     Reason for Termination 11/08/2019 Protocol Complete     Test Indication 11/08/2019 Abnormal ECG     Target Hr Formular 11/08/2019 (220 - Age)*85%    Office Visit on 11/04/2019   Component Date Value    Ventricular Rate 11/04/2019 63     Atrial Rate 11/04/2019 63     AR Interval 11/04/2019 174     QRSD Interval 11/04/2019 84     QT Interval 11/04/2019 402     QTC Interval 11/04/2019 411     P Axis 11/04/2019 64     QRS Axis 11/04/2019 2     T [place] 11/04/2019 102    Appointment on 11/04/2019   Component Date Value    Sodium 11/04/2019 143     Potassium 11/04/2019 4 7     Chloride 11/04/2019 105     CO2 11/04/2019 30     ANION GAP 11/04/2019 8     BUN 11/04/2019 24     Creatinine 11/04/2019 1 56*    Glucose, Fasting 11/04/2019 171*    Calcium 11/04/2019 9 9     eGFR 11/04/2019 33     WBC 11/04/2019 8 32     RBC 11/04/2019 5 17*    Hemoglobin 11/04/2019 14 9     Hematocrit 11/04/2019 46 4*    MCV 11/04/2019 90     MCH 11/04/2019 28 8     MCHC 11/04/2019 32 1     RDW 11/04/2019 13 3     MPV 11/04/2019 10 7     Platelets 77/36/7260 258     nRBC 11/04/2019 0     Neutrophils Relative 11/04/2019 65     Immat GRANS % 11/04/2019 1     Lymphocytes Relative 11/04/2019 22     Monocytes Relative 11/04/2019 8     Eosinophils Relative 11/04/2019 3     Basophils Relative 11/04/2019 1     Neutrophils Absolute 11/04/2019 5 42     Immature Grans Absolute 11/04/2019 0 05     Lymphocytes Absolute 11/04/2019 1 86     Monocytes Absolute 11/04/2019 0 67     Eosinophils Absolute 11/04/2019 0 26     Basophils Absolute 11/04/2019 0 06     Hemoglobin A1C 11/04/2019 5 7     EAG 11/04/2019 117     Urine Culture 11/08/2019 30,000-39,000 cfu/ml      Creatinine, Ur 11/04/2019 144 0     Microalbum  ,U,Random 11/04/2019 1,140 0*    Microalb Creat Ratio 11/04/2019 792*   Procedure visit on 10/31/2019   Component Date Value    LEUKOCYTE ESTERASE,UA 10/31/2019 NEG     NITRITE,UA 10/31/2019 NEG     SL AMB POCT UROBILINOGEN 10/31/2019 NEG     POCT URINE PROTEIN 10/31/2019 +++      PH,UA 10/31/2019 6     BLOOD,UA 10/31/2019 NEG     SPECIFIC GRAVITY,UA 10/31/2019 1 020     KETONES,UA 10/31/2019 Trace     BILIRUBIN,UA 10/31/2019 NEG     GLUCOSE, UA 10/31/2019 NEG      COLOR,UA 10/31/2019 Yellow     CLARITY,UA 10/31/2019 Clear  Clarity, UA 11/08/2019 Slightly Cloudy     Color, UA 11/08/2019 Yellow     Specific Gravity, UA 11/08/2019 1 010     pH, UA 11/08/2019 6 0     Glucose, UA 11/08/2019 Negative     Ketones, UA 11/08/2019 Negative     Blood, UA 11/08/2019 Large*    Protein, UA 11/08/2019 100 (2+)*    Nitrite, UA 11/08/2019 Negative     Bilirubin, UA 11/08/2019 Negative     Urobilinogen, UA 11/08/2019 0 2     Leukocytes, UA 11/08/2019 Negative     WBC, UA 11/08/2019 2-4*    RBC, UA 11/08/2019 30-50*    Bacteria, UA 11/08/2019 Occasional     Epithelial Cells 11/08/2019 Occasional     Color, UA 10/31/2019 Yellow     Clarity, UA 10/31/2019 Clear     Specific Gravity, UA 10/31/2019 1 020     pH, UA 10/31/2019 6 0     Leukocytes, UA 10/31/2019 Negative     Nitrite, UA 10/31/2019 Negative     Protein, UA 10/31/2019 100 (2+)*    Glucose, UA 10/31/2019 Negative     Ketones, UA 10/31/2019 Negative     Urobilinogen, UA 10/31/2019 0 2     Bilirubin, UA 10/31/2019 Negative     Blood, UA 10/31/2019 Moderate*    Case Report 10/31/2019                      Value:Non-gynecologic Cytology                          Case: [id number]                                  Authorizing Provider:  Corwin Kumar MD        Collected:           10/31/2019 1440              Ordering Location:     [facility]        Received:            10/31/2019 1440                                     Urology [place]                                                         Pathologist:           Latesha Gill MD                                                                 Specimen:    Urine, Clean Catch                                                                         Final Diagnosis 10/31/2019                      Value: This result contains rich text formatting which cannot be displayed here  Nathan Flower Gross Description 10/31/2019                      Value: This result contains rich text formatting which cannot be displayed here      Additional Information 10/31/2019                      Value: This result contains rich text formatting which cannot be displayed here      RBC, UA 10/31/2019 None Seen     WBC, UA 10/31/2019 None Seen     Epithelial Cells 10/31/2019 None Seen     Bacteria, UA 10/31/2019 Occasional     Hyaline Casts, UA 10/31/2019 None Seen    Orders Only on 10/25/2019   Component Date Value    Specific Gravity 10/25/2019 1 013     Ph 10/25/2019 6 0     Color UA 10/25/2019 Yellow     Urine Appearance 10/25/2019 Clear     Leukocyte Esterase 10/25/2019 Negative     Protein 10/25/2019 2+*    Glucose, 24 HR Urine 10/25/2019 Negative     Ketone, Urine 10/25/2019 Negative     Blood, Urine 10/25/2019 1+*    Bilirubin, Urine 10/25/2019 Negative     Urobilinogen Urine 10/25/2019 0 2     SL AMB NITRITES URINE, Q* 10/25/2019 Negative     Microscopic Examination 10/25/2019 See below:     Urinalysis Reflex 10/25/2019 Comment     SL AMB WBC, URINE 10/25/2019 0-5     RBC, Urine 10/25/2019 0-2     Epithelial Cells (non re* 10/25/2019 0-10     Bacteria, Urine 10/25/2019 None seen          Imaging  @[id number]@  Recent Results (from the past 17316 hour(s))   NM myocardial perfusion spect (rx stress and/or rest)    [place] [id number]  [phone]    Rest/Stress Gated SPECT Myocardial Perfusion Imaging After Regadenoson    Patient: Chel Brennan  MR number: [id number]  Account number: [de-identified]  : 1945  Age: 69 years  Gender: Female  Status: Outpatient  Location: Stress lab  Height: 58 in  Weight: 163 lb  BP: 206/ 84 mmHg    Allergies: ATORVASTATIN, COLESEVELAM, COLESTIPOL, EZETIMIBE, FENOFIBRATE, POLLEN EXTRACT, ROSUVASTATIN, STATINS    Diagnosis: R94 31 - Abnormal electrocardiogram [ECG] [EKG]    Primary Physician:  Rachell Davila DO  Referring Physician:  Rachell Davila DO  Group:  [facility]  Other:  Sally Sport Sabrina Rain MS, CCT  Interpreting Physician:  Aftab Matamoros MD    INDICATIONS: Detection of coronary artery disease  Pre-operative risk assessment  HISTORY: The patient is a 69year old  female  Chest pain status: no chest pain  Coronary artery disease risk factors: dyslipidemia, hypertension, smoking, diabetes mellitus, and post-menopausal state  Cardiovascular history:  peripheral vascular occlusive disease, stroke, and transient ischemic attack  Co-morbidity: history of renal disease and obesity  GERD, renal and celiac stenosis  Medications: a beta blocker, a calcium channel blocker, an ACE  inhibitor/ARB, clopidogrel, and an antihypertensive agent  Previous test results: abnormal ECG and hyperlipidemia  PHYSICAL EXAM: Baseline physical exam screening: normal     REST ECG: Normal sinus rhythm  Nonspecific ST and T wave abnormalities were present  PROCEDURE: The study was performed in the the Stress lab  A regadenoson infusion pharmacologic stress test was performed  Gated SPECT myocardial perfusion imaging was performed after stress and at rest  Systolic blood pressure was 206  mmHg, at the start of the study  Diastolic blood pressure was 84 mmHg, at the start of the study  The heart rate was 63 bpm, at the start of the study  Regadenoson protocol:  HR bpm SBP mmHg DBP mmHg Symptoms Rhythm/conduct  Baseline 63 206 84 none NSR, no ectopy  Immediate 83 126 84 mild dyspnea, nausea NSR, no ectopy  1 min 76 -- -- none NSR, no ectopy  2 min 71 184 78 none NSR, no ectopy  Rest SpO2 98, Peak Stress SpO2 99    STRESS SUMMARY: Duration of pharmacologic stress was 3 min  Maximal heart rate during stress was 83 bpm  The rate-pressure product for the peak heart rate and blood pressure was 61363  There was no chest pain during stress  The stress test  was terminated due to protocol completion   The stress ECG was negative for ischemia and normal     ISOTOPE ADMINISTRATION:  Resting isotope administration Stress isotope administration  Agent Tetrofosmin Tetrofosmin  Dose 9 9 mCi 29 6 mCi  Date 2019  Injection-image interval 30 min 30 min    The radiopharmaceutical was injected at the peak effect of pharmacologic stress  MYOCARDIAL PERFUSION IMAGING:  The image quality was good  PERFUSION DEFECTS:  -  There was a small, mildly severe, partially reversible myocardial perfusion defect of anterior and inferior wall  -  There was of the inferior and anterior wall  GATED SPECT:  The calculated left ventricular ejection fraction was 61 %  There was no left ventricular regional abnormality  SUMMARY:  -  Stress results: There was no chest pain during stress  -  ECG conclusions: The stress ECG was negative for ischemia and normal   -  Perfusion imaging: There was a small, mildly severe, partially reversible myocardial perfusion defect of anterior and inferior wall There was of the inferior and anterior wall  -  Gated SPECT: The calculated left ventricular ejection fraction was 61 %  There was no left ventricular regional abnormality  Prepared and signed by    Randy Alves MD  Signed 2019 17:53:08     Echo complete with contrast if indicated    [place] [id number]  [phone]    Transthoracic Echocardiogram  2D, M-mode, Doppler, and Color Doppler    Study date:  2019    Patient: Christian Ortiz  MR number: [id number]  Account number: [de-identified]  : 15-Juma-1945  Age: 69 years  Gender: Female  Status: Outpatient  Location: Echo lab  Height: 58 in  Weight: 163 lb  BP: 216/ 86 mmHg    Indications: Abnormal EKG, Assess left ventricular function      Diagnoses: R94 31 - Abnormal electrocardiogram [ECG] [EKG]    Sonographer:   [facility] ALMAS Bates  Referring Physician:  Sandra Carty DO  Group:  [facility]  Interpreting Physician:  Randy Alves MD    SUMMARY    LEFT VENTRICLE:  Systolic function was normal  Ejection fraction was estimated to be 60 %  There were no regional wall motion abnormalities  Wall thickness was mildly increased  Doppler parameters were consistent with abnormal left ventricular relaxation (grade 1 diastolic dysfunction)  Doppler parameters were consistent with elevated ventricular end-diastolic filling pressure  LEFT ATRIUM:  The atrium was mildly dilated  MITRAL VALVE:  There was mild annular calcification  There was mild regurgitation  TRICUSPID VALVE:  There was trace regurgitation  AORTA:  The root exhibited normal size and mild fibrocalcific change  HISTORY: Consistent with transient ischemic attack or stroke  PRIOR HISTORY: CKD, Risk factors: hypertension, diabetes, and hypercholesterolemia  PROCEDURE: The procedure was performed in the echo lab  This was a routine study  The transthoracic approach was used  The study included complete 2D imaging, M-mode, complete spectral Doppler, and color Doppler  The heart rate was 60 bpm,  at the start of the study  Images were obtained from the parasternal, apical, subcostal, and suprasternal notch acoustic windows  Image quality was adequate  LEFT VENTRICLE: Size was normal  Systolic function was normal  Ejection fraction was estimated to be 60 %  There were no regional wall motion abnormalities  Wall thickness was mildly increased  No evidence of apical thrombus  DOPPLER:  Doppler parameters were consistent with abnormal left ventricular relaxation (grade 1 diastolic dysfunction)  Doppler parameters were consistent with elevated ventricular end-diastolic filling pressure  RIGHT VENTRICLE: The size was normal  Systolic function was normal  Wall thickness was normal     LEFT ATRIUM: The atrium was mildly dilated  RIGHT ATRIUM: Size was normal     MITRAL VALVE: There was mild annular calcification  Valve structure was normal  There was mild-moderate calcification   There was mildly reduced leaflet separation  DOPPLER: The transmitral velocity was within the normal range  There was no  evidence for stenosis  There was mild regurgitation  AORTIC VALVE: The valve was trileaflet  Leaflets exhibited normal thickness and normal cuspal separation  DOPPLER: Transaortic velocity was within the normal range  There was no evidence for stenosis  There was no significant  regurgitation  TRICUSPID VALVE: The valve structure was normal  There was normal leaflet separation  DOPPLER: The transtricuspid velocity was within the normal range  There was no evidence for stenosis  There was trace regurgitation  PULMONIC VALVE: Leaflets exhibited normal thickness, no calcification, and normal cuspal separation  DOPPLER: The transpulmonic velocity was within the normal range  There was no significant regurgitation  PERICARDIUM: There was no pericardial effusion  The pericardium was normal in appearance  AORTA: The root exhibited normal size and mild fibrocalcific change  SYSTEMIC VEINS: IVC: The inferior vena cava was normal in size      SYSTEM MEASUREMENT TABLES    2D  %FS: 30 9 %  Ao Diam: 2 7 cm  EDV(Teich): 80 7 ml  EF(Teich): 58 9 %  ESV(Teich): 33 1 ml  IVSd: 1 1 cm  LA Area: 17 cm2  LA Diam: 3 9 cm  LVEDV MOD A4C: 115 5 ml  LVEF MOD A4C: 60 5 %  LVESV MOD A4C: 45 6 ml  LVIDd: 4 2 cm  LVIDs: 2 9 cm  LVLd A4C: 8 cm  LVLs A4C: 6 7 cm  LVPWd: 1 1 cm  RA Area: 15 1 cm2  RVIDd: 2 9 cm  SV MOD A4C: 69 9 ml  SV(Teich): 47 5 ml    MM  TAPSE: 2 1 cm    PW  E': 0 m/s  E/E': 27 9  MV A Carlos A: 1 5 m/s  MV Dec [place]: 3 4 m/s2  MV DecT: 293 8 ms  MV E Carlos A: 1 m/s  MV E/A Ratio: 0 7  MV PHT: 85 2 ms  MVA By PHT: 2 6 cm2    [employer] Accredited Echocardiography Laboratory    Prepared and electronically signed by    Eliceo Post MD  Signed [id number] 17:32:58     ECG 12 lead   Result Value    Ventricular Rate 63    Atrial Rate 63    MS Interval 174    QRSD Interval 84    QT Interval 402    QTC Interval 411    P Axis 64    QRS Axis 2    T Wave Axis 102    Narrative    Normal sinus rhythm  Minimal voltage criteria for LVH, may be normal variant  Possible Inferior infarct , age undetermined  Anterior infarct , age undetermined  Confirmed by Flavio Rollins ([id number]) on 11/4/2019 12:51:00 PM     No orders to display     No results found for this or any previous visit  Physical Exam   Constitutional: She is oriented to person, place, and time  She appears well-developed  HENT:   Right Ear: External ear normal    Left Ear: External ear normal    Eyes: Right eye exhibits no discharge  Left eye exhibits no discharge  No scleral icterus  Neck: Carotid bruit is not present  No tracheal deviation present  No thyroid mass and no thyromegaly present  Cardiovascular: Normal rate, regular rhythm, normal heart sounds and intact distal pulses  Exam reveals no gallop and no friction rub  No murmur heard  Pulmonary/Chest: No respiratory distress  She has no wheezes  She has no rales  Musculoskeletal: She exhibits no edema  Lymphadenopathy:     She has no cervical adenopathy  Neurological: She is alert and oriented to person, place, and time  Coordination normal    Psychiatric: She has a normal mood and affect  Her behavior is normal  Judgment and thought content normal    Nursing note and vitals reviewed  Vaishali Branch DO  2/5/2020  4:37 PM  Sign at close encounter  Assessment/Plan:    1 patient with ground-glass nodule 7 mm in the right upper lobe has appoint with Dr Luis Rebollar to decide whether biopsy or follow-up is indicated  2  Patient with failed stress test is seeing Dr Ashanti Lockwood for hopefully to schedule catheterization and talk about PSK9 medications such as Repatha or Praluent  3  Tobacco abuse will use inhaler to take as needed and wean off of cigarettes and then wean off the inhaler  4  Chronic kidney disease is mild has good follow-up with Dr Mariano Cota  5   Diffuse vascular disease different blood pressure right left arm tobacco cessation is encouraged  6  Type 2 diabetes will see her back in 2 months  7  Chronic bronchitis continued follow-up with pulmonary       Diagnoses and all orders for this visit:    Initial Medicare annual wellness visit    Tobacco use disorder  -     nicotine (NICOTROL) 10 MG inhaler; Inhale 1 puff as needed for smoking cessation        The patient was counseled regarding instructions for management, risk factor reductions, patient and family education,impressions, risks and benefits of treatment options, side effects of medications, importance of compliance with treatment  The treatment plan was reviewed with the patient/guardian and patient/guardian understands and agrees with the treatment plan  Current Outpatient Medications:     amLODIPine (NORVASC) 5 mg tablet, Take 1 tablet (5 mg total) by mouth 2 (two) times a day, Disp: 60 tablet, Rfl: 11    Cholecalciferol (VITAMIN D3) 1000 units CAPS, Take 1 capsule by mouth every other day , Disp: , Rfl:     clopidogrel (PLAVIX) 75 mg tablet, Take 1 tablet (75 mg total) by mouth daily, Disp: 90 tablet, Rfl: 1    Icosapent Ethyl (VASCEPA) 1 g CAPS, 2 cap bid with meals, Disp: 360 capsule, Rfl: 2    labetalol (NORMODYNE) 200 mg tablet, Take 1 tablet (200 mg total) by mouth 2 (two) times a day, Disp: 180 tablet, Rfl: 2    losartan (COZAAR) 100 MG tablet, Take 100 mg by mouth every morning , Disp: , Rfl:     losartan (COZAAR) 50 mg tablet, Take 1 tablet by mouth daily  , Disp: , Rfl:     traMADol (ULTRAM) 50 mg tablet, Take 1 tablet (50 mg total) by mouth every 8 (eight) hours as needed for moderate pain, Disp: 60 tablet, Rfl: 1    nicotine (NICOTROL) 10 MG inhaler, Inhale 1 puff as needed for smoking cessation, Disp: 42 each, Rfl: 5    Subjective:      Patient ID: Tristan Tyler is a 69 y o  female      Chronic cough more than 5 years, sinus pressure      The following portions of the patient's history were reviewed and updated as appropriate:   She has a past medical history of Arthritis, Coronary artery disease, Diabetes mellitus ([facility] ), Endometriosis, High cholesterol, Hypertension, Kidney disease, chronic, stage III (moderate, EGFR 30-59 ml/min) ([facility]), Lumbar disc herniation, Peripheral vascular disease ([facility] ), Shoulder injury, Spinal stenosis, and Stroke [facility] )  ,  does not have any pertinent problems on file  ,   has a past surgical history that includes Rotator cuff repair (Left); Tonsillectomy; Ovarian cyst surgery; and Fracture surgery  ,  family history includes Arthritis in her brother; Heart defect in her father; Heart disease in her mother; Hyperlipidemia in her mother; Hypertension in her father and mother; Other in her brother; Stroke in her sister  ,   reports that she has been smoking cigarettes  She has been smoking about 1 00 pack per day  She has never used smokeless tobacco  She reports that she does not drink alcohol or use drugs  ,  is allergic to atorvastatin; colesevelam; colestipol; ezetimibe; fenofibrate; pollen extract; rosuvastatin; and statins       Review of Systems   Constitutional: Negative for appetite change, chills, fatigue, fever and unexpected weight change  HENT: Positive for postnasal drip and sinus pressure  Negative for congestion, ear pain, facial swelling, hearing loss, mouth sores, nosebleeds, rhinorrhea, sinus pain, sore throat, trouble swallowing and voice change  Eyes: Negative for pain, discharge, redness and visual disturbance  Respiratory: Positive for cough  Negative for apnea, chest tightness, shortness of breath, wheezing and stridor  Cardiovascular: Negative for chest pain, palpitations and leg swelling  Gastrointestinal: Negative for abdominal distention, abdominal pain, blood in stool, constipation, diarrhea and vomiting  Endocrine: Negative for cold intolerance, heat intolerance, polydipsia, polyphagia and polyuria     Genitourinary: Negative for difficulty urinating, dysuria, flank pain, frequency, genital sores, hematuria and urgency  Musculoskeletal: Negative for arthralgias and back pain  Skin: Negative for rash and wound  Allergic/Immunologic: Negative for environmental allergies, food allergies and immunocompromised state  Neurological: Negative for dizziness, tremors, seizures, syncope, facial asymmetry, speech difficulty, weakness, light-headedness, numbness and headaches  Hematological: Negative for adenopathy  Does not bruise/bleed easily  Psychiatric/Behavioral: Negative for agitation, behavioral problems, dysphoric mood, hallucinations, self-injury, sleep disturbance and suicidal ideas  The patient is not hyperactive  Objective:  /80 (BP Location: Right arm, Patient Position: Sitting)   Pulse 76   Temp 97 9 °F (36 6 °C)   Resp 16   Ht 4' 10" (1 473 m)   Wt 76 2 kg (168 lb)   LMP  (LMP Unknown)   SpO2 96%   BMI 35 11 kg/m²      Lab Review  Orders Only on 01/15/2020   Component Date Value    Class Description 01/15/2020 Comment     D  PTERONYSSINUS IGE 01/15/2020 <0 10     D  FARINAE 01/15/2020 <0 10     Cat Dander 01/15/2020 <0 10     DOG DANDER IGE 01/15/2020 <0 10     BERMUDA GRASS IGE 01/15/2020 <0 10     Blue [place] 01/15/2020 <0 10     Nima Grass 01/15/2020 <0 10     [place] [address] [place] [address] [place] 01/15/2020 <0 10     Z609-ZcM [place]* 01/15/2020 <0 10     PENICILLIUM CHRYSOGENUM 01/15/2020 <0 10     Cladosporium Herbarum 01/15/2020 <0 10     Aspergillus fumigatus 01/15/2020 <0 10     Mucor Racemosus 01/15/2020 <0 10     Alternaria Alternata 01/15/2020 <0 10     S  HERBARUM 01/15/2020 <0 10     Birch 01/15/2020 <0 10     T007 [place] 01/15/2020 <0 10     [place] 01/15/2020 <0 10     MAPLE/BOX ELDER IGE 01/15/2020 <0 10     [place] 01/15/2020 <0 10     T070 [place] 01/15/2020 <0 10     MOUNTAIN CEDAR 01/15/2020 <0 10     Short Ragwd(A jocy ) * 01/15/2020 <0 10     Plantain 01/15/2020 <0 10     COMMON PIGWEED 01/15/2020 <0 10     Nettle 01/15/2020 <0 10     Aspergillus fumigatus 3 * 01/15/2020 Negative     Aspergillus fumigatus 2 * 01/15/2020 Negative     Saccharomonospora Viridi* 01/15/2020 Negative     Thermoactinomyces Candid* 01/15/2020 Negative     A  Fumigatus #1 Abs 01/15/2020 Negative     Aspergillus fumigatus 6 * 01/15/2020 Negative     A  Pullulans Abs 01/15/2020 Negative     M  FAENI ABS 01/15/2020 Negative     [place] Serum Abs 01/15/2020 Negative     T VULGARIS#1 01/15/2020 Negative    Orders Only on 01/15/2020   Component Date Value    White Blood Cell Count 01/15/2020 8 1     Red Blood Cell Count 01/15/2020 5 06     Hemoglobin 01/15/2020 15 2     HCT 01/15/2020 43 5     MCV 01/15/2020 86     MCH 01/15/2020 30 0     MCHC 01/15/2020 34 9     RDW 01/15/2020 14 1     Platelet Count 04/82/5157 261     Neutrophils 01/15/2020 62     Lymphocytes 01/15/2020 24     Monocytes 01/15/2020 9     Eosinophils 01/15/2020 3     Basophils PCT 01/15/2020 1     Neutrophils (Absolute) 01/15/2020 5 1     Lymphocytes (Absolute) 01/15/2020 1 9     Monocytes (Absolute) 01/15/2020 0 7     Eosinophils (Absolute) 01/15/2020 0 3     Basophils ABS 01/15/2020 0 0     Immature Granulocytes 01/15/2020 1     Immature Granulocytes (A* 01/15/2020 0 0     Glucose, Random 01/15/2020 117*    BUN 01/15/2020 23     Creatinine 01/15/2020 1 55*    eGFR Non  01/15/2020 33*    eGFR  01/15/2020 38*    SL AMB BUN/CREATININE RA* 01/15/2020 15     Sodium 01/15/2020 139     Potassium 01/15/2020 4 3     Chloride 01/15/2020 100     CO2 01/15/2020 21     CALCIUM 01/15/2020 9 7     Protein, Total 01/15/2020 7 0     Albumin 01/15/2020 4 5     Globulin, Total 01/15/2020 2 5     Albumin/Globulin Ratio 01/15/2020 1 8     TOTAL BILIRUBIN 01/15/2020 0 3     Alk Phos Isoenzymes 01/15/2020 103     AST 01/15/2020 16     ALT 01/15/2020 16     Specific Gravity 01/15/2020 1 011     Ph 01/15/2020 6 5     Color UA 01/15/2020 Red*    Urine Appearance 01/15/2020 Clear     Leukocyte Esterase 01/15/2020 Trace*    Protein 01/15/2020 2+*    Glucose, 24 HR Urine 01/15/2020 Negative     Ketone, Urine 01/15/2020 Negative     Blood, Urine 01/15/2020 3+*    Bilirubin, Urine 01/15/2020 Negative     Urobilinogen Urine 01/15/2020 0 2     SL AMB NITRITES URINE, Q* 01/15/2020 Negative     Microscopic Examination 01/15/2020 See below:     Urinalysis Reflex 01/15/2020 Comment     SL AMB WBC, URINE 01/15/2020 0-5     RBC, Urine 01/15/2020 >30*    Epithelial Cells (non re* 01/15/2020 0-10     Bacteria, Urine 01/15/2020 Few     Urine Culture Result 01/15/2020 Final report     Result 1 01/15/2020 No growth     Creatinine, Urine 01/15/2020 55 4     Microalbum  ,U,Random 01/15/2020 522 8     Microalb/Creat Ratio 01/15/2020 943 7*    Hemoglobin A1C 01/15/2020 5 9*    25-HYDROXY VIT D 01/15/2020 30 9     TSH 01/15/2020 2 880     Uric Acid 01/15/2020 7 2*    Magnesium, Serum 01/15/2020 1 9    Transcribe Orders on 11/08/2019   Component Date Value    Case Report 11/08/2019                      Value:Non-gynecologic Cytology                          Case: [id number]                                  Authorizing Provider:  Jennifer Lopez MD        Collected:           11/08/2019 1103              Ordering Location:     [address] [place] [address]      Received:            11/08/2019 1103                                     Laboratory Services                                                          Pathologist:           Greg Mcarthur MD                                                   Specimen:    Urine, Other                                                                               Final Diagnosis 11/08/2019                      Value: This result contains rich text formatting which cannot be displayed here  Faisal Claros Gross Description 11/08/2019                      Value: This result contains rich text formatting which cannot be displayed here   Additional Information 11/08/2019                      Value: This result contains rich text formatting which cannot be displayed here  Hospital Outpatient Visit on 11/08/2019   Component Date Value    Protocol Name 11/08/2019 LEXISCAN-SIT     Time In Exercise Phase 11/08/2019 00:03:00     MAX   SYSTOLIC BP 11/34/0762 364     Max Diastolic Bp 27/94/6438 84     Max Heart Rate 11/08/2019 84     Max Predicted Heart Rate 11/08/2019 148     Reason for Termination 11/08/2019 Protocol Complete     Test Indication 11/08/2019 Abnormal ECG     Target Hr Formular 11/08/2019 (220 - Age)*85%    Office Visit on 11/04/2019   Component Date Value    Ventricular Rate 11/04/2019 63     Atrial Rate 11/04/2019 63     NH Interval 11/04/2019 174     QRSD Interval 11/04/2019 84     QT Interval 11/04/2019 402     QTC Interval 11/04/2019 411     P Axis 11/04/2019 64     QRS Axis 11/04/2019 2     T Wave [place] 11/04/2019 102    Appointment on 11/04/2019   Component Date Value    Sodium 11/04/2019 143     Potassium 11/04/2019 4 7     Chloride 11/04/2019 105     CO2 11/04/2019 30     ANION GAP 11/04/2019 8     BUN 11/04/2019 24     Creatinine 11/04/2019 1 56*    Glucose, Fasting 11/04/2019 171*    Calcium 11/04/2019 9 9     eGFR 11/04/2019 33     WBC 11/04/2019 8 32     RBC 11/04/2019 5 17*    Hemoglobin 11/04/2019 14 9     Hematocrit 11/04/2019 46 4*    MCV 11/04/2019 90     MCH 11/04/2019 28 8     MCHC 11/04/2019 32 1     RDW 11/04/2019 13 3     MPV 11/04/2019 10 7     Platelets 00/08/8845 258     nRBC 11/04/2019 0     Neutrophils Relative 11/04/2019 65     Immat GRANS % 11/04/2019 1     Lymphocytes Relative 11/04/2019 22     Monocytes Relative 11/04/2019 8     Eosinophils Relative 11/04/2019 3     Basophils Relative 11/04/2019 1     Neutrophils Absolute 11/04/2019 5 42     Immature Grans Absolute 11/04/2019 0 05     Lymphocytes Absolute 11/04/2019 1 86     Monocytes Absolute 11/04/2019 0 67     Eosinophils Absolute 11/04/2019 0 26     Basophils Absolute 11/04/2019 0 06     Hemoglobin A1C 11/04/2019 5 7     EAG 11/04/2019 117     Urine Culture 11/08/2019 30,000-39,000 cfu/ml      Creatinine, Ur 11/04/2019 144 0     Microalbum  ,U,Random 11/04/2019 1,140 0*    Microalb Creat Ratio 11/04/2019 792*   Procedure visit on 10/31/2019   Component Date Value    LEUKOCYTE ESTERASE,UA 10/31/2019 NEG     NITRITE,UA 10/31/2019 NEG     SL AMB POCT UROBILINOGEN 10/31/2019 NEG     POCT URINE PROTEIN 10/31/2019 +++      PH,UA 10/31/2019 6     BLOOD,UA 10/31/2019 NEG     SPECIFIC GRAVITY,UA 10/31/2019 1 020     KETONES,UA 10/31/2019 Trace     BILIRUBIN,UA 10/31/2019 NEG     GLUCOSE, UA 10/31/2019 NEG      COLOR,UA 10/31/2019 Yellow     CLARITY,UA 10/31/2019 Clear     Clarity, UA 11/08/2019 Slightly Cloudy     Color, UA 11/08/2019 Yellow     Specific Gravity, UA 11/08/2019 1 010     pH, UA 11/08/2019 6 0     Glucose, UA 11/08/2019 Negative     Ketones, UA 11/08/2019 Negative     Blood, UA 11/08/2019 Large*    Protein, UA 11/08/2019 100 (2+)*    Nitrite, UA 11/08/2019 Negative     Bilirubin, UA 11/08/2019 Negative     Urobilinogen, UA 11/08/2019 0 2     Leukocytes, UA 11/08/2019 Negative     WBC, UA 11/08/2019 2-4*    RBC, UA 11/08/2019 30-50*    Bacteria, UA 11/08/2019 Occasional     Epithelial Cells 11/08/2019 Occasional     Color, UA 10/31/2019 Yellow     Clarity, UA 10/31/2019 Clear     Specific Gravity, UA 10/31/2019 1 020     pH, UA 10/31/2019 6 0     Leukocytes, UA 10/31/2019 Negative     Nitrite, UA 10/31/2019 Negative     Protein, UA 10/31/2019 100 (2+)*    Glucose, UA 10/31/2019 Negative     Ketones, UA 10/31/2019 Negative     Urobilinogen, UA 10/31/2019 0 2     Bilirubin, UA 10/31/2019 Negative     Blood, UA 10/31/2019 Moderate*    Case Report 10/31/2019                      Value:Non-gynecologic Cytology Case: [id number]                                  Authorizing Provider:  Suellen Ortega MD        Collected:           10/31/2019 1440              Ordering Location[facility]     [facility]        Received:            10/31/2019 1440                                     [facility]                                                         Pathologist:           Debbie Diego MD                                                                 Specimen:    Urine, Clean Catch                                                                         Final Diagnosis 10/31/2019                      Value: This result contains rich text formatting which cannot be displayed here  Christos Pinto Gross Description 10/31/2019                      Value: This result contains rich text formatting which cannot be displayed here   Additional Information 10/31/2019                      Value: This result contains rich text formatting which cannot be displayed here      RBC, UA 10/31/2019 None Seen     WBC, UA 10/31/2019 None Seen     Epithelial Cells 10/31/2019 None Seen     Bacteria, UA 10/31/2019 Occasional     Hyaline Casts, UA 10/31/2019 None Seen    Orders Only on 10/25/2019   Component Date Value    Specific Gravity 10/25/2019 1 013     Ph 10/25/2019 6 0     Color UA 10/25/2019 Yellow     Urine Appearance 10/25/2019 Clear     Leukocyte Esterase 10/25/2019 Negative     Protein 10/25/2019 2+*    Glucose, 24 HR Urine 10/25/2019 Negative     Ketone, Urine 10/25/2019 Negative     Blood, Urine 10/25/2019 1+*    Bilirubin, Urine 10/25/2019 Negative     Urobilinogen Urine 10/25/2019 0 2     SL AMB NITRITES URINE, Q* 10/25/2019 Negative     Microscopic Examination 10/25/2019 See below:     Urinalysis Reflex 10/25/2019 Comment     SL AMB WBC, URINE 10/25/2019 0-5     RBC, Urine 10/25/2019 0-2     Epithelial Cells (non re* 10/25/2019 0-10     Bacteria, Urine 10/25/2019 None seen Imaging  @[email or web address]@  Recent Results (from the past 07355 hour(s))   NM myocardial perfusion spect (rx stress and/or rest)    [address] [phone]    Rest/Stress Gated SPECT Myocardial Perfusion Imaging After Regadenoson    Patient: Dalila Lundborg  MR number: [id number]  Account number: [de-identified]  : 1945  Age: 69 years  Gender: Female  Status: Outpatient  Location: Stress lab  Height: 58 in  Weight: 163 lb  BP: 206/ 84 mmHg    Allergies: ATORVASTATIN, COLESEVELAM, COLESTIPOL, EZETIMIBE, FENOFIBRATE, POLLEN EXTRACT, ROSUVASTATIN, STATINS    Diagnosis: R94 31 - Abnormal electrocardiogram [ECG] [EKG]    Primary Physician:  Yulisa Gomez DO  Referring Physician:  Yulisa Gomez DO  Group:  [facility]  Other:  Audrey Berkowitz MS, CCT  Interpreting Physician:  Lana Miles MD    INDICATIONS: Detection of coronary artery disease  Pre-operative risk assessment  HISTORY: The patient is a 69year old  female  Chest pain status: no chest pain  Coronary artery disease risk factors: dyslipidemia, hypertension, smoking, diabetes mellitus, and post-menopausal state  Cardiovascular history:  peripheral vascular occlusive disease, stroke, and transient ischemic attack  Co-morbidity: history of renal disease and obesity  GERD, renal and celiac stenosis  Medications: a beta blocker, a calcium channel blocker, an ACE  inhibitor/ARB, clopidogrel, and an antihypertensive agent  Previous test results: abnormal ECG and hyperlipidemia  PHYSICAL EXAM: Baseline physical exam screening: normal     REST ECG: Normal sinus rhythm  Nonspecific ST and T wave abnormalities were present  PROCEDURE: The study was performed in the the Stress lab  A regadenoson infusion pharmacologic stress test was performed   Gated SPECT myocardial perfusion imaging was performed after stress and at rest  Systolic blood pressure was 206  mmHg, at the start of the study  Diastolic blood pressure was 84 mmHg, at the start of the study  The heart rate was 63 bpm, at the start of the study  Regadenoson protocol:  HR bpm SBP mmHg DBP mmHg Symptoms Rhythm/conduct  Baseline 63 206 84 none NSR, no ectopy  Immediate 83 126 84 mild dyspnea, nausea NSR, no ectopy  1 min 76 -- -- none NSR, no ectopy  2 min 71 184 78 none NSR, no ectopy  Rest SpO2 98, Peak Stress SpO2 99    STRESS SUMMARY: Duration of pharmacologic stress was 3 min  Maximal heart rate during stress was 83 bpm  The rate-pressure product for the peak heart rate and blood pressure was 65110  There was no chest pain during stress  The stress test  was terminated due to protocol completion  The stress ECG was negative for ischemia and normal     ISOTOPE ADMINISTRATION:  Resting isotope administration Stress isotope administration  Agent Tetrofosmin Tetrofosmin  Dose 9 9 mCi 29 6 mCi  Date 11/08/2019 11/08/2019  Injection-image interval 30 min 30 min    The radiopharmaceutical was injected at the peak effect of pharmacologic stress  MYOCARDIAL PERFUSION IMAGING:  The image quality was good  PERFUSION DEFECTS:  -  There was a small, mildly severe, partially reversible myocardial perfusion defect of anterior and inferior wall  -  There was of the inferior and anterior wall  GATED SPECT:  The calculated left ventricular ejection fraction was 61 %  There was no left ventricular regional abnormality  SUMMARY:  -  Stress results: There was no chest pain during stress  -  ECG conclusions: The stress ECG was negative for ischemia and normal   -  Perfusion imaging: There was a small, mildly severe, partially reversible myocardial perfusion defect of anterior and inferior wall There was of the inferior and anterior wall  -  Gated SPECT: The calculated left ventricular ejection fraction was 61 %  There was no left ventricular regional abnormality      Prepared and signed by    Lalitha Lechuga MD  Signed 2019 17:53:08     Echo complete with contrast if indicated    Narrative    [address] [place] [address]  [phone]    Transthoracic Echocardiogram  2D, M-mode, Doppler, and Color Doppler    Study date:  2019    Patient: Cee Zimmerman  MR number: [id number]  Account number: [de-identified]  : 15-Juma-1945  Age: 69 years  Gender: Female  Status: Outpatient  Location: Echo lab  Height: 58 in  Weight: 163 lb  BP: 216/ 86 mmHg    Indications: Abnormal EKG, Assess left ventricular function  Diagnoses: R94 31 - Abnormal electrocardiogram [ECG] [EKG]    Sonographer:   [facility]  Referring Physician:  Marco Lomeli DO  Group:  [facility]  Interpreting Physician:  Lalitha Lechuga MD    SUMMARY    LEFT VENTRICLE:  Systolic function was normal  Ejection fraction was estimated to be 60 %  There were no regional wall motion abnormalities  Wall thickness was mildly increased  Doppler parameters were consistent with abnormal left ventricular relaxation (grade 1 diastolic dysfunction)  Doppler parameters were consistent with elevated ventricular end-diastolic filling pressure  LEFT ATRIUM:  The atrium was mildly dilated  MITRAL VALVE:  There was mild annular calcification  There was mild regurgitation  TRICUSPID VALVE:  There was trace regurgitation  AORTA:  The root exhibited normal size and mild fibrocalcific change  HISTORY: Consistent with transient ischemic attack or stroke  PRIOR HISTORY: CKD, Risk factors: hypertension, diabetes, and hypercholesterolemia  PROCEDURE: The procedure was performed in the echo lab  This was a routine study  The transthoracic approach was used  The study included complete 2D imaging, M-mode, complete spectral Doppler, and color Doppler  The heart rate was 60 bpm,  at the start of the study   Images were obtained from the parasternal, apical, subcostal, and suprasternal notch acoustic windows  Image quality was adequate  LEFT VENTRICLE: Size was normal  Systolic function was normal  Ejection fraction was estimated to be 60 %  There were no regional wall motion abnormalities  Wall thickness was mildly increased  No evidence of apical thrombus  DOPPLER:  Doppler parameters were consistent with abnormal left ventricular relaxation (grade 1 diastolic dysfunction)  Doppler parameters were consistent with elevated ventricular end-diastolic filling pressure  RIGHT VENTRICLE: The size was normal  Systolic function was normal  Wall thickness was normal     LEFT ATRIUM: The atrium was mildly dilated  RIGHT ATRIUM: Size was normal     MITRAL VALVE: There was mild annular calcification  Valve structure was normal  There was mild-moderate calcification  There was mildly reduced leaflet separation  DOPPLER: The transmitral velocity was within the normal range  There was no  evidence for stenosis  There was mild regurgitation  AORTIC VALVE: The valve was trileaflet  Leaflets exhibited normal thickness and normal cuspal separation  DOPPLER: Transaortic velocity was within the normal range  There was no evidence for stenosis  There was no significant  regurgitation  TRICUSPID VALVE: The valve structure was normal  There was normal leaflet separation  DOPPLER: The transtricuspid velocity was within the normal range  There was no evidence for stenosis  There was trace regurgitation  PULMONIC VALVE: Leaflets exhibited normal thickness, no calcification, and normal cuspal separation  DOPPLER: The transpulmonic velocity was within the normal range  There was no significant regurgitation  PERICARDIUM: There was no pericardial effusion  The pericardium was normal in appearance  AORTA: The root exhibited normal size and mild fibrocalcific change  SYSTEMIC VEINS: IVC: The inferior vena cava was normal in size      SYSTEM MEASUREMENT TABLES    2D  %FS: 30 9 %  Ao Diam: 2 7 cm  EDV(Teich): 80 7 ml  EF(Teich): 58 9 %  ESV(Teich): 33 1 ml  IVSd: 1 1 cm  LA Area: 17 cm2  LA Diam: 3 9 cm  LVEDV MOD A4C: 115 5 ml  LVEF MOD A4C: 60 5 %  LVESV MOD A4C: 45 6 ml  LVIDd: 4 2 cm  LVIDs: 2 9 cm  LVLd A4C: 8 cm  LVLs A4C: 6 7 cm  LVPWd: 1 1 cm  RA Area: 15 1 cm2  RVIDd: 2 9 cm  SV MOD A4C: 69 9 ml  SV(Teich): 47 5 ml    MM  TAPSE: 2 1 cm    PW  E': 0 m/s  E/E': 27 9  MV A Carlos A: 1 5 m/s  MV Dec [place]: 3 4 m/s2  MV DecT: 293 8 ms  MV E Carlos A: 1 m/s  MV E/A Ratio: 0 7  MV PHT: 85 2 ms  MVA By PHT: 2 6 cm2    [facility] Acc[facility]    Prepared and electronically signed by    Ciara Quick MD  Signed [id number] 17:32:58     ECG 12 lead   Result Value    Ventricular Rate 63    Atrial Rate 63    UT Interval 174    QRSD Interval 84    QT Interval 402    QTC Interval 411    P Axis 64    QRS Axis 2    T Wave Axis 102    Narrative    Normal sinus rhythm  Minimal voltage criteria for LVH, may be normal variant  Possible Inferior infarct , age undetermined  Anterior infarct , age undetermined  Confirmed by Flavio Rollins ([id number]) on 11/4/2019 12:51:00 PM     No orders to display     No results found for this or any previous visit  Physical Exam   Constitutional: She is oriented to person, place, and time  She appears well-developed  HENT:   Right Ear: External ear normal    Left Ear: External ear normal    Eyes: Right eye exhibits no discharge  Left eye exhibits no discharge  No scleral icterus  Neck: Carotid bruit is not present  No tracheal deviation present  No thyroid mass and no thyromegaly present  Cardiovascular: Normal rate, regular rhythm, normal heart sounds and intact distal pulses  Exam reveals no gallop and no friction rub  No murmur heard  Pulmonary/Chest: No respiratory distress  She has no wheezes  She has no rales  Musculoskeletal: She exhibits no edema  Lymphadenopathy:     She has no cervical adenopathy  Neurological: She is alert and oriented to person, place, and time  Coordination normal    Psychiatric: She has a normal mood and affect  Her behavior is normal  Judgment and thought content normal    Nursing note and vitals reviewed  Namrata Grant DO  2/5/2020  4:20 PM  Sign at close encounter   Assessment and Plan:     Problem List Items Addressed This Visit     None           Preventive health issues were discussed with patient, and age appropriate screening tests were ordered as noted in patient's After Visit Summary  Personalized health advice and appropriate referrals for health education or preventive services given if needed, as noted in patient's After Visit Summary  History of Present Illness:     Patient presents for Medicare Annual Wellness visit    Patient Care Team:  Namrata Grant DO as PCP - General (Internal Medicine)  MD Alexandro Tesfaye as Nurse Practitioner (Internal Medicine)  Apple Montemayor MD (Urology)     Problem List:     Patient Active Problem List   Diagnosis    Spinal stenosis    Basilar artery stenosis    Breast mass    Cerebrovascular accident (CVA) ([address] )    Chronic GERD    Kidney disease, chronic, stage III (moderate, EGFR 30-59 ml/min) ([address] )    Claudication in peripheral vascular disease ([address] )    Depression with anxiety    Type 2 diabetes mellitus with diabetic nephropathy ([address] )    Endometriosis    Gout    Hx-TIA (transient ischemic attack)    Hypercholesteremia    Hyperlipidemia associated with type 2 diabetes mellitus ([address] )    Hypertension    Hypertension associated with diabetes ([address] )    Osteoarthritis    Obesity (BMI 30-39  9)    Polyneuropathy    Right renal artery stenosis (HCC)    Tobacco use disorder    Vertebrobasilar artery syndrome    Vitamin D deficiency    Statin intolerance    Lumbar disc herniation    Pain of left lower extremity    Abnormal EKG    Spondylosis of lumbar region without myelopathy or radiculopathy    Aortoiliac stenosis, left (HCC)    Hypokalemia    Acute drug-induced gout of left foot    Gross hematuria on tissue paper after wiping    Decreased pulses in feet    Refused influenza vaccine    Hypercalcemia    Lumbosacral spondylosis without myelopathy    Neurodermatitis    Other proteinuria    Obesity with body mass index 30 or greater    Hyperlipidemia, unspecified    Herniated lumbar intervertebral disc    Atherosclerosis of renal artery (HCC)    Bladder tumor    Celiac artery stenosis (HCC) >70% stenosis in the celiac trunk    Abnormal CT of the chest suspicious for an infectious or inflammatory bronchiolitis   Positive cardiac stress test  small, mildly severe, partially reversible myocardial perfusion defect of anterior and inferior wall     Femoral artery stenosis, right (HCC) 50-75% stenosis in the common femoral artery      Superior mesenteric artery stenosis (HCC) left    Immunization not carried out because of patient refusal    Median arcuate ligament syndrome ([facility]    Abdominal bruit      Past Medical and Surgical History:     Past Medical History:   Diagnosis Date    Arthritis     Coronary artery disease     Diabetes mellitus ([facility]     Endometriosis     High cholesterol     Hypertension     Kidney disease, chronic, stage III (moderate, EGFR 30-59 ml/min) ([facility])     Lumbar disc herniation     Peripheral vascular disease ([facility])     Shoulder injury     left    Spinal stenosis     Stroke [facility])      Past Surgical History:   Procedure Laterality Date    FRACTURE SURGERY      OVARIAN CYST SURGERY      ROTATOR CUFF REPAIR Left     TONSILLECTOMY        Family History:     Family History   Problem Relation Age of Onset    Hypertension Mother     Heart disease Mother         Valvular    Hyperlipidemia Mother     Hypertension Father     Heart defect Father         Cardiomegaly    Stroke Sister         Cerebrovascular Accident    Arthritis Brother     Other Brother         Back Disorder      Social History:     Social History     Socioeconomic History    Marital status: /Civil Union     Spouse name: None    Number of children: None    Years of education: None    Highest education level: None   Occupational History    None   Social Needs    Financial resource strain: None    Food insecurity:     Worry: None     Inability: None    Transportation needs:     Medical: None     Non-medical: None   Tobacco Use    Smoking status: Current Every Day Smoker     Packs/day: 1 00     Types: Cigarettes    Smokeless tobacco: Never Used   Substance and Sexual Activity    Alcohol use: No    Drug use: No    Sexual activity: Not Currently     Partners: Male   Lifestyle    Physical activity:     Days per week: None     Minutes per session: None    Stress: None   Relationships    Social connections:     Talks on phone: None     Gets together: None     Attends Congregational service: None     Active member of club or organization: None     Attends meetings of clubs or organizations: None     Relationship status: None    Intimate partner violence:     Fear of current or ex partner: None     Emotionally abused: None     Physically abused: None     Forced sexual activity: None   Other Topics Concern    None   Social History Narrative    Occasional Caffeine Consumption       Medications and Allergies:     Current Outpatient Medications   Medication Sig Dispense Refill    amLODIPine (NORVASC) 5 mg tablet Take 1 tablet (5 mg total) by mouth 2 (two) times a day 60 tablet 11    Cholecalciferol (VITAMIN D3) 1000 units CAPS Take 1 capsule by mouth every other day       clopidogrel (PLAVIX) 75 mg tablet Take 1 tablet (75 mg total) by mouth daily 90 tablet 1    Icosapent Ethyl (VASCEPA) 1 g CAPS 2 cap bid with meals 360 capsule 2    labetalol (NORMODYNE) 200 mg tablet Take 1 tablet (200 mg total) by mouth 2 (two) times a day 180 tablet 2    losartan (COZAAR) 100 MG tablet Take 100 mg by mouth every morning       losartan (COZAAR) 50 mg tablet Take 1 tablet by mouth daily        traMADol (ULTRAM) 50 mg tablet Take 1 tablet (50 mg total) by mouth every 8 (eight) hours as needed for moderate pain 60 tablet 1     No current facility-administered medications for this visit  Allergies   Allergen Reactions    Atorvastatin Dizziness and Myalgia     Other reaction(s): Dizziness/Myalgia    Colesevelam      Other reaction(s): leg pains    Colestipol      Other reaction(s): Swelling, Itching    Ezetimibe      Other reaction(s): heartburn  Other reaction(s): Heart Burn    Fenofibrate      Other reaction(s): blood in urine  hx  Kidney Failure    Pollen Extract     Rosuvastatin      Other reaction(s): Leg Cramping    Statins Myalgia      Immunizations:     Immunization History   Administered Date(s) Administered    Pneumococcal Conjugate 13-Valent 09/21/2016    Pneumococcal Polysaccharide PPV23 12/14/2011      Health Maintenance:         Topic Date Due    Hepatitis C Screening  1945    MAMMOGRAM  02/05/2021 (Originally 1945)    CRC Screening: Cologuard  11/19/2022         Topic Date Due    DTaP,Tdap,and Td Vaccines (1 - Tdap) 06/15/1958    Hepatitis B Vaccine (1 of 3 - Risk 3-dose series) 06/15/1966    Pneumococcal Vaccine: 65+ Years (2 of 2 - PPSV23) 09/21/2017      Medicare Health Risk Assessment:     Pulse 76   Temp 97 9 °F (36 6 °C)   Resp 16   Ht 4' 10" (1 473 m)   Wt 76 2 kg (168 lb)   LMP  (LMP Unknown)   SpO2 96%   BMI 35 11 kg/m²       Estela Marsh is here for her Initial Wellness visit  Last Medicare Wellness visit information reviewed, patient interviewed and updates made to the record today  Health Risk Assessment:   Patient rates overall health as good  Patient feels that their physical health rating is same  Eyesight was rated as same  Hearing was rated as same  Patient feels that their emotional and mental health rating is much better   Pain experienced in the last 7 days has been some  Patient's pain rating has been 6/10  Patient states that she has experienced no weight loss or gain in last 6 months  Depression Screening:   PHQ-2 Score: 6  PHQ-9 Score: 13      Fall Risk Screening: In the past year, patient has experienced: no history of falling in past year      Urinary Incontinence Screening:   Patient has not leaked urine accidently in the last six months  Home Safety:  Patient has trouble with stairs inside or outside of their home  Patient has working smoke alarms and has working carbon monoxide detector  Home safety hazards include: none  Nutrition:   Current diet is Regular  Medications:   Patient is currently taking over-the-counter supplements  OTC medications include: see medication list  Patient is able to manage medications  Activities of Daily Living (ADLs)/Instrumental Activities of Daily Living (IADLs):   Walk and transfer into and out of bed and chair?: Yes  Dress and groom yourself?: Yes    Bathe or shower yourself?: Yes    Feed yourself? Yes  Do your laundry/housekeeping?: Yes  Manage your money, pay your bills and track your expenses?: Yes  Make your own meals?: Yes    Do your own shopping?: Yes    Previous Hospitalizations:   Any hospitalizations or ED visits within the last 12 months?: No      Advance Care Planning:   Living will: Yes    Durable POA for healthcare:  Yes    Advanced directive: Yes    Advanced directive counseling given: Yes    Five wishes given: Yes    Patient declined ACP directive: No    End of Life Decisions reviewed with patient: Yes    Provider agrees with end of life decisions: Yes      Cognitive Screening:   Provider or family/friend/caregiver concerned regarding cognition?: No    PREVENTIVE SCREENINGS      Cardiovascular Screening:    General: Screening Not Indicated, History Lipid Disorder and Screening Current      Diabetes Screening:     General: Screening Not Indicated, History Diabetes and Screening Current      Colorectal Cancer Screening:     General: Screening Current      Breast Cancer Screening:     General: Patient Declines      Cervical Cancer Screening:    General: Screening Not Indicated      Osteoporosis Screening:    General: Patient Declines      Abdominal Aortic Aneurysm (AAA) Screening:        General: Screening Current      Lung Cancer Screening:     General: Screening Current      Hepatitis C Screening:    General: Patient Declines      Tali Mejia DO

## 2020-02-05 NOTE — LETTER
Assessment/Plan:    1 patient with ground-glass nodule 7 mm in the right upper lobe has appoint with Dr Theodore Loaiza to decide whether biopsy or follow-up is indicated  2  Patient with failed stress test is seeing Dr Shaquille Gauthier for hopefully to schedule catheterization and talk about PSK9 medications such as Repatha or Praluent  3  Tobacco abuse will use inhaler to take as needed and wean off of cigarettes and then wean off the inhaler  4  Chronic kidney disease is mild has good follow-up with Dr Pedro Green  5  Diffuse vascular disease different blood pressure right left arm tobacco cessation is encouraged  6  Type 2 diabetes will see her back in 2 months  7  Chronic bronchitis continued follow-up with pulmonary       Diagnoses and all orders for this visit:    Initial Medicare annual wellness visit    Tobacco use disorder  -     nicotine (NICOTROL) 10 MG inhaler; Inhale 1 puff as needed for smoking cessation        The patient was counseled regarding instructions for management, risk factor reductions, patient and family education,impressions, risks and benefits of treatment options, side effects of medications, importance of compliance with treatment  The treatment plan was reviewed with the patient/guardian and patient/guardian understands and agrees with the treatment plan              Current Outpatient Medications:     amLODIPine (NORVASC) 5 mg tablet, Take 1 tablet (5 mg total) by mouth 2 (two) times a day, Disp: 60 tablet, Rfl: 11    Cholecalciferol (VITAMIN D3) 1000 units CAPS, Take 1 capsule by mouth every other day , Disp: , Rfl:     clopidogrel (PLAVIX) 75 mg tablet, Take 1 tablet (75 mg total) by mouth daily, Disp: 90 tablet, Rfl: 1    Icosapent Ethyl (VASCEPA) 1 g CAPS, 2 cap bid with meals, Disp: 360 capsule, Rfl: 2    labetalol (NORMODYNE) 200 mg tablet, Take 1 tablet (200 mg total) by mouth 2 (two) times a day, Disp: 180 tablet, Rfl: 2    losartan (COZAAR) 100 MG tablet, Take 100 mg by mouth every morning , Disp: , Rfl:     losartan (COZAAR) 50 mg tablet, Take 1 tablet by mouth daily  , Disp: , Rfl:     traMADol (ULTRAM) 50 mg tablet, Take 1 tablet (50 mg total) by mouth every 8 (eight) hours as needed for moderate pain, Disp: 60 tablet, Rfl: 1    nicotine (NICOTROL) 10 MG inhaler, Inhale 1 puff as needed for smoking cessation, Disp: 42 each, Rfl: 5    Subjective:      Patient ID: Mike Prader is a 69 y o  female  Chronic cough more than 5 years, sinus pressure      The following portions of the patient's history were reviewed and updated as appropriate:   She has a past medical history of Arthritis, Coronary artery disease, Diabetes mellitus ([facility] [address] ), Endometriosis, High cholesterol, Hypertension, Kidney disease, chronic, stage III (moderate, EGFR 30-59 ml/min) ([facility]), Lumbar disc herniation, Peripheral vascular disease ([facility] [address] ), Shoulder injury, Spinal stenosis, and Stroke ([facility] [address] )  ,  does not have any pertinent problems on file  ,   has a past surgical history that includes Rotator cuff repair (Left); Tonsillectomy; Ovarian cyst surgery; and Fracture surgery  ,  family history includes Arthritis in her brother; Heart defect in her father; Heart disease in her mother; Hyperlipidemia in her mother; Hypertension in her father and mother; Other in her brother; Stroke in her sister  ,   reports that she has been smoking cigarettes  She has been smoking about 1 00 pack per day  She has never used smokeless tobacco  She reports that she does not drink alcohol or use drugs  ,  is allergic to atorvastatin; colesevelam; colestipol; ezetimibe; fenofibrate; pollen extract; rosuvastatin; and statins       Review of Systems   Constitutional: Negative for appetite change, chills, fatigue, fever and unexpected weight change  HENT: Positive for postnasal drip and sinus pressure   Negative for congestion, ear pain, facial swelling, hearing loss, mouth sores, nosebleeds, rhinorrhea, sinus pain, sore throat, trouble swallowing and voice change  Eyes: Negative for pain, discharge, redness and visual disturbance  Respiratory: Positive for cough  Negative for apnea, chest tightness, shortness of breath, wheezing and stridor  Cardiovascular: Negative for chest pain, palpitations and leg swelling  Gastrointestinal: Negative for abdominal distention, abdominal pain, blood in stool, constipation, diarrhea and vomiting  Endocrine: Negative for cold intolerance, heat intolerance, polydipsia, polyphagia and polyuria  Genitourinary: Negative for difficulty urinating, dysuria, flank pain, frequency, genital sores, hematuria and urgency  Musculoskeletal: Negative for arthralgias and back pain  Skin: Negative for rash and wound  Allergic/Immunologic: Negative for environmental allergies, food allergies and immunocompromised state  Neurological: Negative for dizziness, tremors, seizures, syncope, facial asymmetry, speech difficulty, weakness, light-headedness, numbness and headaches  Hematological: Negative for adenopathy  Does not bruise/bleed easily  Psychiatric/Behavioral: Negative for agitation, behavioral problems, dysphoric mood, hallucinations, self-injury, sleep disturbance and suicidal ideas  The patient is not hyperactive  Objective:  /80 (BP Location: Right arm, Patient Position: Sitting)   Pulse 76   Temp 97 9 °F (36 6 °C)   Resp 16   Ht 4' 10" (1 473 m)   Wt 76 2 kg (168 lb)   LMP  (LMP Unknown)   SpO2 96%   BMI 35 11 kg/m²     Lab Review  Orders Only on 01/15/2020   Component Date Value    Class Description 01/15/2020 Comment     D  PTERONYSSINUS IGE 01/15/2020 <0 10     D   FARINAE 01/15/2020 <0 10     Cat Dander 01/15/2020 <0 10     DOG DANDER IGE 01/15/2020 <0 10     BERMUDA GRASS IGE 01/15/2020 <0 10     Blue Grass 01/15/2020 <0 10     Nima Grass 01/15/2020 <0 10     [address] [place] 01/15/2020 <0 10     D037-MhF Derrek [place]* 01/15/2020 <0 10     PENICILLIUM CHRYSOGENUM 01/15/2020 <0 10     Cladosporium Herbarum 01/15/2020 <0 10     Aspergillus fumigatus 01/15/2020 <0 10     Mucor Racemosus 01/15/2020 <0 10     Alternaria Alternata 01/15/2020 <0 10     S  HERBARUM 01/15/2020 <0 10     Birch 01/15/2020 <0 10     T007 [place], White 01/15/2020 <0 10     [place] 01/15/2020 <0 10     MAPLE/BOX ELDER IGE 01/15/2020 <0 10     [place] 01/15/2020 <0 10     T070 White [place] 01/15/2020 <0 10     MOUNTAIN CEDAR 01/15/2020 <0 10     Short Ragwd(A jocy ) * 01/15/2020 <0 10     Plantain 01/15/2020 <0 10     COMMON PIGWEED 01/15/2020 <0 10     Nettle 01/15/2020 <0 10     Aspergillus fumigatus 3 * 01/15/2020 Negative     Aspergillus fumigatus 2 * 01/15/2020 Negative     Saccharomonospora Viridi* 01/15/2020 Negative     Thermoactinomyces Candid* 01/15/2020 Negative     A  Fumigatus #1 Abs 01/15/2020 Negative     Aspergillus fumigatus 6 * 01/15/2020 Negative     A  Pullulans Abs 01/15/2020 Negative     M  FAENI ABS 01/15/2020 Negative     Hunter Serum Abs 01/15/2020 Negative     T VULGARIS#1 01/15/2020 Negative    Orders Only on 01/15/2020   Component Date Value    White Blood Cell Count 01/15/2020 8 1     Red Blood Cell Count 01/15/2020 5 06     Hemoglobin 01/15/2020 15 2     HCT 01/15/2020 43 5     MCV 01/15/2020 86     MCH 01/15/2020 30 0     MCHC 01/15/2020 34 9     RDW 01/15/2020 14 1     Platelet Count 88/95/6174 261     Neutrophils 01/15/2020 62     Lymphocytes 01/15/2020 24     Monocytes 01/15/2020 9     Eosinophils 01/15/2020 3     Basophils PCT 01/15/2020 1     Neutrophils (Absolute) 01/15/2020 5 1     Lymphocytes (Absolute) 01/15/2020 1 9     Monocytes (Absolute) 01/15/2020 0 7     Eosinophils (Absolute) 01/15/2020 0 3     Basophils ABS 01/15/2020 0 0     Immature Granulocytes 01/15/2020 1     Immature Granulocytes (A* 01/15/2020 0 0     Glucose, Random 01/15/2020 117*    BUN 01/15/2020 23     Creatinine 01/15/2020 1 55*    eGFR Non  01/15/2020 33*    eGFR  01/15/2020 38*    SL AMB BUN/CREATININE RA* 01/15/2020 15     Sodium 01/15/2020 139     Potassium 01/15/2020 4 3     Chloride 01/15/2020 100     CO2 01/15/2020 21     CALCIUM 01/15/2020 9 7     Protein, Total 01/15/2020 7 0     Albumin 01/15/2020 4 5     Globulin, Total 01/15/2020 2 5     Albumin/Globulin Ratio 01/15/2020 1 8     TOTAL BILIRUBIN 01/15/2020 0 3     Alk Phos Isoenzymes 01/15/2020 103     AST 01/15/2020 16     ALT 01/15/2020 16     Specific Gravity 01/15/2020 1 011     Ph 01/15/2020 6 5     Color UA 01/15/2020 Red*    Urine Appearance 01/15/2020 Clear     Leukocyte Esterase 01/15/2020 Trace*    Protein 01/15/2020 2+*    Glucose, 24 HR Urine 01/15/2020 Negative     Ketone, Urine 01/15/2020 Negative     Blood, Urine 01/15/2020 3+*    Bilirubin, Urine 01/15/2020 Negative     Urobilinogen Urine 01/15/2020 0 2     SL AMB NITRITES URINE, Q* 01/15/2020 Negative     Microscopic Examination 01/15/2020 See below:     Urinalysis Reflex 01/15/2020 Comment     SL AMB WBC, URINE 01/15/2020 0-5     RBC, Urine 01/15/2020 >30*    Epithelial Cells (non re* 01/15/2020 0-10     Bacteria, Urine 01/15/2020 Few     Urine Culture Result 01/15/2020 Final report     Result 1 01/15/2020 No growth     Creatinine, Urine 01/15/2020 55 4     Microalbum  ,U,Random 01/15/2020 522 8     Microalb/Creat Ratio 01/15/2020 943 7*    Hemoglobin A1C 01/15/2020 5 9*    25-HYDROXY VIT D 01/15/2020 30 9     TSH 01/15/2020 2 880     Uric Acid 01/15/2020 7 2*    Magnesium, Serum 01/15/2020 1 9    Transcribe Orders on 11/08/2019   Component Date Value    Case Report 11/08/2019                      Value:Non-gynecologic Cytology                          Case: [id number]                                  Authorizing Provider:  Samantha Wyatt MD        Collected:           11/08/2019 1103              Ordering Location:     [address] [place] [address]      Received: 11/08/2019 1103                                     Laboratory Services                                                          Pathologist:           Greg Mcarthur MD                                                   Specimen:    Urine, Other                                                                               Final Diagnosis 11/08/2019                      Value: This result contains rich text formatting which cannot be displayed here  aFisal Claros Gross Description 11/08/2019                      Value: This result contains rich text formatting which cannot be displayed here   Additional Information 11/08/2019                      Value: This result contains rich text formatting which cannot be displayed here  Hospital Outpatient Visit on 11/08/2019   Component Date Value    Protocol Name 11/08/2019 LEXISCAN-SIT     Time In Exercise Phase 11/08/2019 00:03:00     MAX   SYSTOLIC BP 66/36/7140 638     Max Diastolic Bp 43/56/8282 84     Max Heart Rate 11/08/2019 84     Max Predicted Heart Rate 11/08/2019 148     Reason for Termination 11/08/2019 Protocol Complete     Test Indication 11/08/2019 Abnormal ECG     Target Hr Formular 11/08/2019 (220 - Age)*85%    Office Visit on 11/04/2019   Component Date Value    Ventricular Rate 11/04/2019 63     Atrial Rate 11/04/2019 63     MN Interval 11/04/2019 174     QRSD Interval 11/04/2019 84     QT Interval 11/04/2019 402     QTC Interval 11/04/2019 411     P Axis 11/04/2019 64     QRS Axis 11/04/2019 2     T Wave [place] 11/04/2019 102    Appointment on 11/04/2019   Component Date Value    Sodium 11/04/2019 143     Potassium 11/04/2019 4 7     Chloride 11/04/2019 105     CO2 11/04/2019 30     ANION GAP 11/04/2019 8     BUN 11/04/2019 24     Creatinine 11/04/2019 1 56*    Glucose, Fasting 11/04/2019 171*    Calcium 11/04/2019 9 9     eGFR 11/04/2019 33     WBC 11/04/2019 8 32     RBC 11/04/2019 5 17*    Hemoglobin 11/04/2019 14 9     Hematocrit 11/04/2019 46 4*    MCV 11/04/2019 90     MCH 11/04/2019 28 8     MCHC 11/04/2019 32 1     RDW 11/04/2019 13 3     MPV 11/04/2019 10 7     Platelets 05/13/2477 258     nRBC 11/04/2019 0     Neutrophils Relative 11/04/2019 65     Immat GRANS % 11/04/2019 1     Lymphocytes Relative 11/04/2019 22     Monocytes Relative 11/04/2019 8     Eosinophils Relative 11/04/2019 3     Basophils Relative 11/04/2019 1     Neutrophils Absolute 11/04/2019 5 42     Immature Grans Absolute 11/04/2019 0 05     Lymphocytes Absolute 11/04/2019 1 86     Monocytes Absolute 11/04/2019 0 67     Eosinophils Absolute 11/04/2019 0 26     Basophils Absolute 11/04/2019 0 06     Hemoglobin A1C 11/04/2019 5 7     EAG 11/04/2019 117     Urine Culture 11/08/2019 30,000-39,000 cfu/ml      Creatinine, Ur 11/04/2019 144 0     Microalbum  ,U,Random 11/04/2019 1,140 0*    Microalb Creat Ratio 11/04/2019 792*   Procedure visit on 10/31/2019   Component Date Value    LEUKOCYTE ESTERASE,UA 10/31/2019 NEG     NITRITE,UA 10/31/2019 NEG     SL AMB POCT UROBILINOGEN 10/31/2019 NEG     POCT URINE PROTEIN 10/31/2019 +++      PH,UA 10/31/2019 6     BLOOD,UA 10/31/2019 NEG     SPECIFIC GRAVITY,UA 10/31/2019 1 020     KETONES,UA 10/31/2019 Trace     BILIRUBIN,UA 10/31/2019 NEG     GLUCOSE, UA 10/31/2019 NEG      COLOR,UA 10/31/2019 Yellow     CLARITY,UA 10/31/2019 Clear     Clarity, UA 11/08/2019 Slightly Cloudy     Color, UA 11/08/2019 Yellow     Specific Gravity, UA 11/08/2019 1 010     pH, UA 11/08/2019 6 0     Glucose, UA 11/08/2019 Negative     Ketones, UA 11/08/2019 Negative     Blood, UA 11/08/2019 Large*    Protein, UA 11/08/2019 100 (2+)*    Nitrite, UA 11/08/2019 Negative     Bilirubin, UA 11/08/2019 Negative     Urobilinogen, UA 11/08/2019 0 2     Leukocytes, UA 11/08/2019 Negative     WBC, UA 11/08/2019 2-4*    RBC, UA 11/08/2019 30-50*    Bacteria, UA 11/08/2019 Occasional     Epithelial Cells 11/08/2019 Occasional     Color, UA 10/31/2019 Yellow     Clarity, UA 10/31/2019 Clear     Specific Gravity, UA 10/31/2019 1 020     pH, UA 10/31/2019 6 0     Leukocytes, UA 10/31/2019 Negative     Nitrite, UA 10/31/2019 Negative     Protein, UA 10/31/2019 100 (2+)*    Glucose, UA 10/31/2019 Negative     Ketones, UA 10/31/2019 Negative     Urobilinogen, UA 10/31/2019 0 2     Bilirubin, UA 10/31/2019 Negative     Blood, UA 10/31/2019 Moderate*    Case Report 10/31/2019                      Value:Non-gynecologic Cytology                          Case: [id number]                                  Authorizing Provider:  Corrinne Scriver, MD        Collected:           10/31/2019 1440              Ordering Location:     [address] [place] [address] For        Received:            10/31/2019 1440                                     Urology [facility]                                                         Pathologist:           Ju Vega MD                                                                 Specimen:    Urine, Clean Catch                                                                         Final Diagnosis 10/31/2019                      Value: This result contains rich text formatting which cannot be displayed here  Emaline Folds Gross Description 10/31/2019                      Value: This result contains rich text formatting which cannot be displayed here   Additional Information 10/31/2019                      Value: This result contains rich text formatting which cannot be displayed here      RBC, UA 10/31/2019 None Seen     WBC, UA 10/31/2019 None Seen     Epithelial Cells 10/31/2019 None Seen     Bacteria, UA 10/31/2019 Occasional     Hyaline Casts, UA 10/31/2019 None Seen    Orders Only on 10/25/2019   Component Date Value    Specific Gravity 10/25/2019 1 013     Ph 10/25/2019 6 0     Color UA 10/25/2019 Yellow     Urine Appearance 10/25/2019 Clear     Leukocyte Esterase 10/25/2019 Negative     Protein 10/25/2019 2+*    Glucose, 24 HR Urine 10/25/2019 Negative     Ketone, Urine 10/25/2019 Negative     Blood, Urine 10/25/2019 1+*    Bilirubin, Urine 10/25/2019 Negative     Urobilinogen Urine 10/25/2019 0 2     SL AMB NITRITES URINE, Q* 10/25/2019 Negative     Microscopic Examination 10/25/2019 See below:     Urinalysis Reflex 10/25/2019 Comment     SL AMB WBC, URINE 10/25/2019 0-5     RBC, Urine 10/25/2019 0-2     Epithelial Cells (non re* 10/25/2019 0-10     Bacteria, Urine 10/25/2019 None seen          Imaging  @[id number]@  Recent Results (from the past 21979 hour(s))   NM myocardial perfusion spect (rx stress and/or rest)    Narrative    [address]  [facility]  [phone]    Rest/Stress Gated SPECT Myocardial Perfusion Imaging After Regadenoson    Patient: Chandu Traylor  MR number: [id number]  Account number: [de-identified]  : 1945  Age: 69 years  Gender: Female  Status: Outpatient  Location: Stress lab  Height: 58 in  Weight: 163 lb  BP: 206/ 84 mmHg    Allergies: ATORVASTATIN, COLESEVELAM, COLESTIPOL, EZETIMIBE, FENOFIBRATE, POLLEN EXTRACT, ROSUVASTATIN, STATINS    Diagnosis: R94 31 - Abnormal electrocardiogram [ECG] [EKG]    Primary Physician:  Kourtney Ortiz DO  Referring Physician:  Kourtney Ortiz DO  Group:  [facility]  Other:  Yessi Ramirez MS, CCT  Interpreting Physician:  Kala Solomon MD    INDICATIONS: Detection of coronary artery disease  Pre-operative risk assessment  HISTORY: The patient is a 69year old  female  Chest pain status: no chest pain  Coronary artery disease risk factors: dyslipidemia, hypertension, smoking, diabetes mellitus, and post-menopausal state  Cardiovascular history:  peripheral vascular occlusive disease, stroke, and transient ischemic attack  Co-morbidity: history of renal disease and obesity  GERD, renal and celiac stenosis   Medications: a beta blocker, a calcium channel blocker, an ACE  inhibitor/ARB, clopidogrel, and an antihypertensive agent  Previous test results: abnormal ECG and hyperlipidemia  PHYSICAL EXAM: Baseline physical exam screening: normal     REST ECG: Normal sinus rhythm  Nonspecific ST and T wave abnormalities were present  PROCEDURE: The study was performed in the the Stress lab  A regadenoson infusion pharmacologic stress test was performed  Gated SPECT myocardial perfusion imaging was performed after stress and at rest  Systolic blood pressure was 206  mmHg, at the start of the study  Diastolic blood pressure was 84 mmHg, at the start of the study  The heart rate was 63 bpm, at the start of the study  Regadenoson protocol:  HR bpm SBP mmHg DBP mmHg Symptoms Rhythm/conduct  Baseline 63 206 84 none NSR, no ectopy  Immediate 83 126 84 mild dyspnea, nausea NSR, no ectopy  1 min 76 -- -- none NSR, no ectopy  2 min 71 184 78 none NSR, no ectopy  Rest SpO2 98, Peak Stress SpO2 99    STRESS SUMMARY: Duration of pharmacologic stress was 3 min  Maximal heart rate during stress was 83 bpm  The rate-pressure product for the peak heart rate and blood pressure was 55076  There was no chest pain during stress  The stress test  was terminated due to protocol completion  The stress ECG was negative for ischemia and normal     ISOTOPE ADMINISTRATION:  Resting isotope administration Stress isotope administration  Agent Tetrofosmin Tetrofosmin  Dose 9 9 mCi 29 6 mCi  Date 11/08/2019 11/08/2019  Injection-image interval 30 min 30 min    The radiopharmaceutical was injected at the peak effect of pharmacologic stress  MYOCARDIAL PERFUSION IMAGING:  The image quality was good  PERFUSION DEFECTS:  -  There was a small, mildly severe, partially reversible myocardial perfusion defect of anterior and inferior wall  -  There was of the inferior and anterior wall  GATED SPECT:  The calculated left ventricular ejection fraction was 61 %   There was no left ventricular regional abnormality  SUMMARY:  -  Stress results: There was no chest pain during stress  -  ECG conclusions: The stress ECG was negative for ischemia and normal   -  Perfusion imaging: There was a small, mildly severe, partially reversible myocardial perfusion defect of anterior and inferior wall There was of the inferior and anterior wall  -  Gated SPECT: The calculated left ventricular ejection fraction was 61 %  There was no left ventricular regional abnormality  Prepared and signed by    Luz Elena Fermin MD  Signed 2019 17:53:08     Echo complete with contrast if indicated    Narrative    [address]  [place] [id number]  [phone]    Transthoracic Echocardiogram  2D, M-mode, Doppler, and Color Doppler    Study date:  2019    Patient: Freeman Lazar  MR number: [id number]  Account number: [de-identified]  : 15-Juma-1945  Age: 69 years  Gender: Female  Status: Outpatient  Location: Echo lab  Height: 58 in  Weight: 163 lb  BP: 216/ 86 mmHg    Indications: Abnormal EKG, Assess left ventricular function  Diagnoses: R94 31 - Abnormal electrocardiogram [ECG] [EKG]    Sonographer:   [facility] Avani Bates  Referring Physician:  Collette Yañez DO  Group:  [facility]  Interpreting Physician:  Luz Elena Fermin MD    SUMMARY    LEFT VENTRICLE:  Systolic function was normal  Ejection fraction was estimated to be 60 %  There were no regional wall motion abnormalities  Wall thickness was mildly increased  Doppler parameters were consistent with abnormal left ventricular relaxation (grade 1 diastolic dysfunction)  Doppler parameters were consistent with elevated ventricular end-diastolic filling pressure  LEFT ATRIUM:  The atrium was mildly dilated  MITRAL VALVE:  There was mild annular calcification  There was mild regurgitation  TRICUSPID VALVE:  There was trace regurgitation      AORTA:  The root exhibited normal size and mild fibrocalcific change  HISTORY: Consistent with transient ischemic attack or stroke  PRIOR HISTORY: CKD, Risk factors: hypertension, diabetes, and hypercholesterolemia  PROCEDURE: The procedure was performed in the echo lab  This was a routine study  The transthoracic approach was used  The study included complete 2D imaging, M-mode, complete spectral Doppler, and color Doppler  The heart rate was 60 bpm,  at the start of the study  Images were obtained from the parasternal, apical, subcostal, and suprasternal notch acoustic windows  Image quality was adequate  LEFT VENTRICLE: Size was normal  Systolic function was normal  Ejection fraction was estimated to be 60 %  There were no regional wall motion abnormalities  Wall thickness was mildly increased  No evidence of apical thrombus  DOPPLER:  Doppler parameters were consistent with abnormal left ventricular relaxation (grade 1 diastolic dysfunction)  Doppler parameters were consistent with elevated ventricular end-diastolic filling pressure  RIGHT VENTRICLE: The size was normal  Systolic function was normal  Wall thickness was normal     LEFT ATRIUM: The atrium was mildly dilated  RIGHT ATRIUM: Size was normal     MITRAL VALVE: There was mild annular calcification  Valve structure was normal  There was mild-moderate calcification  There was mildly reduced leaflet separation  DOPPLER: The transmitral velocity was within the normal range  There was no  evidence for stenosis  There was mild regurgitation  AORTIC VALVE: The valve was trileaflet  Leaflets exhibited normal thickness and normal cuspal separation  DOPPLER: Transaortic velocity was within the normal range  There was no evidence for stenosis  There was no significant  regurgitation  TRICUSPID VALVE: The valve structure was normal  There was normal leaflet separation  DOPPLER: The transtricuspid velocity was within the normal range   There was no evidence for stenosis  There was trace regurgitation  PULMONIC VALVE: Leaflets exhibited normal thickness, no calcification, and normal cuspal separation  DOPPLER: The transpulmonic velocity was within the normal range  There was no significant regurgitation  PERICARDIUM: There was no pericardial effusion  The pericardium was normal in appearance  AORTA: The root exhibited normal size and mild fibrocalcific change  SYSTEMIC VEINS: IVC: The inferior vena cava was normal in size  SYSTEM MEASUREMENT TABLES    2D  %FS: 30 9 %  Ao Diam: 2 7 cm  EDV(Teich): 80 7 ml  EF(Teich): 58 9 %  ESV(Teich): 33 1 ml  IVSd: 1 1 cm  LA Area: 17 cm2  LA Diam: 3 9 cm  LVEDV MOD A4C: 115 5 ml  LVEF MOD A4C: 60 5 %  LVESV MOD A4C: 45 6 ml  LVIDd: 4 2 cm  LVIDs: 2 9 cm  LVLd A4C: 8 cm  LVLs A4C: 6 7 cm  LVPWd: 1 1 cm  RA Area: 15 1 cm2  RVIDd: 2 9 cm  SV MOD A4C: 69 9 ml  SV(Teich): 47 5 ml    MM  TAPSE: 2 1 cm    PW  E': 0 m/s  E/E': 27 9  MV A Carlos A: 1 5 m/s  MV Dec Starr: 3 4 m/s2  MV DecT: 293 8 ms  MV E Carlos A: 1 m/s  MV E/A Ratio: 0 7  MV PHT: 85 2 ms  MVA By PHT: 2 6 cm2    [employer] [facility]redited [facility]    Prepared and electronically signed by    Munira Patino MD  Signed [id number] 17:32:58     ECG 12 lead   Result Value    Ventricular Rate 63    Atrial Rate 63    NV Interval 174    QRSD Interval 84    QT Interval 402    QTC Interval 411    P Axis 64    QRS Axis 2    T Wave Axis 102    Narrative    Normal sinus rhythm  Minimal voltage criteria for LVH, may be normal variant  Possible Inferior infarct , age undetermined  Anterior infarct , age undetermined  Confirmed by Dorthey Letters ([id number]) on 11/4/2019 12:51:00 PM     No orders to display     No results found for this or any previous visit  Physical Exam   Constitutional: She is oriented to person, place, and time  She appears well-developed     HENT:   Right Ear: External ear normal    Left Ear: External ear normal    Eyes: Right eye exhibits no discharge  Left eye exhibits no discharge  No scleral icterus  Neck: Carotid bruit is not present  No tracheal deviation present  No thyroid mass and no thyromegaly present  Cardiovascular: Normal rate, regular rhythm, normal heart sounds and intact distal pulses  Exam reveals no gallop and no friction rub  No murmur heard  Pulmonary/Chest: No respiratory distress  She has no wheezes  She has no rales  Musculoskeletal: She exhibits no edema  Lymphadenopathy:     She has no cervical adenopathy  Neurological: She is alert and oriented to person, place, and time  Coordination normal    Psychiatric: She has a normal mood and affect  Her behavior is normal  Judgment and thought content normal    Nursing note and vitals reviewed

## 2020-02-05 NOTE — PROGRESS NOTES
Assessment/Plan:    1 patient with ground-glass nodule 7 mm in the right upper lobe has appoint with Dr Flavio Shanks to decide whether biopsy or follow-up is indicated  2  Patient with failed stress test is seeing Dr Mardel Mortimer for hopefully to schedule catheterization and talk about PSK9 medications such as Repatha or Praluent  3  Tobacco abuse will use inhaler to take as needed and wean off of cigarettes and then wean off the inhaler  4  Chronic kidney disease is mild has good follow-up with Dr Monica Nelson  5  Diffuse vascular disease different blood pressure right left arm tobacco cessation is encouraged  6  Type 2 diabetes will see her back in 2 months  7  Chronic bronchitis continued follow-up with pulmonary       Diagnoses and all orders for this visit:    Initial Medicare annual wellness visit    Tobacco use disorder  -     nicotine (NICOTROL) 10 MG inhaler; Inhale 1 puff as needed for smoking cessation        The patient was counseled regarding instructions for management, risk factor reductions, patient and family education,impressions, risks and benefits of treatment options, side effects of medications, importance of compliance with treatment  The treatment plan was reviewed with the patient/guardian and patient/guardian understands and agrees with the treatment plan              Current Outpatient Medications:     amLODIPine (NORVASC) 5 mg tablet, Take 1 tablet (5 mg total) by mouth 2 (two) times a day, Disp: 60 tablet, Rfl: 11    Cholecalciferol (VITAMIN D3) 1000 units CAPS, Take 1 capsule by mouth every other day , Disp: , Rfl:     clopidogrel (PLAVIX) 75 mg tablet, Take 1 tablet (75 mg total) by mouth daily, Disp: 90 tablet, Rfl: 1    Icosapent Ethyl (VASCEPA) 1 g CAPS, 2 cap bid with meals, Disp: 360 capsule, Rfl: 2    labetalol (NORMODYNE) 200 mg tablet, Take 1 tablet (200 mg total) by mouth 2 (two) times a day, Disp: 180 tablet, Rfl: 2    losartan (COZAAR) 100 MG tablet, Take 100 mg by mouth every morning , Disp: , Rfl:     losartan (COZAAR) 50 mg tablet, Take 1 tablet by mouth daily  , Disp: , Rfl:     traMADol (ULTRAM) 50 mg tablet, Take 1 tablet (50 mg total) by mouth every 8 (eight) hours as needed for moderate pain, Disp: 60 tablet, Rfl: 1    nicotine (NICOTROL) 10 MG inhaler, Inhale 1 puff as needed for smoking cessation, Disp: 42 each, Rfl: 5    Subjective:      Patient ID: Luzma Edmonds is a 69 y o  female  Chronic cough more than 5 years, sinus pressure      The following portions of the patient's history were reviewed and updated as appropriate:   She has a past medical history of Arthritis, Coronary artery disease, Diabetes mellitus ([facility] [address] ), Endometriosis, High cholesterol, Hypertension, Kidney disease, chronic, stage III (moderate, EGFR 30-59 ml/min) ([facility]), Lumbar disc herniation, Peripheral vascular disease ([facility] [address] ), Shoulder injury, Spinal stenosis, and Stroke ([facility] [address] )  ,  does not have any pertinent problems on file  ,   has a past surgical history that includes Rotator cuff repair (Left); Tonsillectomy; Ovarian cyst surgery; and Fracture surgery  ,  family history includes Arthritis in her brother; Heart defect in her father; Heart disease in her mother; Hyperlipidemia in her mother; Hypertension in her father and mother; Other in her brother; Stroke in her sister  ,   reports that she has been smoking cigarettes  She has been smoking about 1 00 pack per day  She has never used smokeless tobacco  She reports that she does not drink alcohol or use drugs  ,  is allergic to atorvastatin; colesevelam; colestipol; ezetimibe; fenofibrate; pollen extract; rosuvastatin; and statins       Review of Systems   Constitutional: Negative for appetite change, chills, fatigue, fever and unexpected weight change  HENT: Positive for postnasal drip and sinus pressure   Negative for congestion, ear pain, facial swelling, hearing loss, mouth sores, nosebleeds, rhinorrhea, sinus pain, sore throat, trouble swallowing and voice change  Eyes: Negative for pain, discharge, redness and visual disturbance  Respiratory: Positive for cough  Negative for apnea, chest tightness, shortness of breath, wheezing and stridor  Cardiovascular: Negative for chest pain, palpitations and leg swelling  Gastrointestinal: Negative for abdominal distention, abdominal pain, blood in stool, constipation, diarrhea and vomiting  Endocrine: Negative for cold intolerance, heat intolerance, polydipsia, polyphagia and polyuria  Genitourinary: Negative for difficulty urinating, dysuria, flank pain, frequency, genital sores, hematuria and urgency  Musculoskeletal: Negative for arthralgias and back pain  Skin: Negative for rash and wound  Allergic/Immunologic: Negative for environmental allergies, food allergies and immunocompromised state  Neurological: Negative for dizziness, tremors, seizures, syncope, facial asymmetry, speech difficulty, weakness, light-headedness, numbness and headaches  Hematological: Negative for adenopathy  Does not bruise/bleed easily  Psychiatric/Behavioral: Negative for agitation, behavioral problems, dysphoric mood, hallucinations, self-injury, sleep disturbance and suicidal ideas  The patient is not hyperactive  Objective:  /80 (BP Location: Right arm, Patient Position: Sitting)   Pulse 76   Temp 97 9 °F (36 6 °C)   Resp 16   Ht 4' 10" (1 473 m)   Wt 76 2 kg (168 lb)   LMP  (LMP Unknown)   SpO2 96%   BMI 35 11 kg/m²     Lab Review  Orders Only on 01/15/2020   Component Date Value    Class Description 01/15/2020 Comment     D  PTERONYSSINUS IGE 01/15/2020 <0 10     D   FARINAE 01/15/2020 <0 10     Cat Dander 01/15/2020 <0 10     DOG DANDER IGE 01/15/2020 <0 10     BERMUDA GRASS IGE 01/15/2020 <0 10     Blue Grass 01/15/2020 <0 10     Nima Grass 01/15/2020 <0 10     [address] Grass 01/15/2020 <0 10     C752-JzG Vassie Cassie* 01/15/2020 <0 10     PENICILLIUM CHRYSOGENUM 01/15/2020 <0 10     Cladosporium Herbarum 01/15/2020 <0 10     Aspergillus fumigatus 01/15/2020 <0 10     Mucor Racemosus 01/15/2020 <0 10     Alternaria Alternata 01/15/2020 <0 10     S  HERBARUM 01/15/2020 <0 10     Birch 01/15/2020 <0 10     T007 [place] 01/15/2020 <0 10     [place] 01/15/2020 <0 10     MAPLE/BOX ELDER IGE 01/15/2020 <0 10     [place] 01/15/2020 <0 10     T070 [place] 01/15/2020 <0 10     MOUNTAIN CEDAR 01/15/2020 <0 10     Short Ragwd(A jocy ) * 01/15/2020 <0 10     Plantain 01/15/2020 <0 10     COMMON PIGWEED 01/15/2020 <0 10     Nettle 01/15/2020 <0 10     Aspergillus fumigatus 3 * 01/15/2020 Negative     Aspergillus fumigatus 2 * 01/15/2020 Negative     Saccharomonospora Viridi* 01/15/2020 Negative     Thermoactinomyces Candid* 01/15/2020 Negative     A  Fumigatus #1 Abs 01/15/2020 Negative     Aspergillus fumigatus 6 * 01/15/2020 Negative     A  Pullulans Abs 01/15/2020 Negative     M  FAENI ABS 01/15/2020 Negative     [place] Serum Abs 01/15/2020 Negative     T VULGARIS#1 01/15/2020 Negative    Orders Only on 01/15/2020   Component Date Value    White Blood Cell Count 01/15/2020 8 1     Red Blood Cell Count 01/15/2020 5 06     Hemoglobin 01/15/2020 15 2     HCT 01/15/2020 43 5     MCV 01/15/2020 86     MCH 01/15/2020 30 0     MCHC 01/15/2020 34 9     RDW 01/15/2020 14 1     Platelet Count 56/75/1615 261     Neutrophils 01/15/2020 62     Lymphocytes 01/15/2020 24     Monocytes 01/15/2020 9     Eosinophils 01/15/2020 3     Basophils PCT 01/15/2020 1     Neutrophils (Absolute) 01/15/2020 5 1     Lymphocytes (Absolute) 01/15/2020 1 9     Monocytes (Absolute) 01/15/2020 0 7     Eosinophils (Absolute) 01/15/2020 0 3     Basophils ABS 01/15/2020 0 0     Immature Granulocytes 01/15/2020 1     Immature Granulocytes (A* 01/15/2020 0 0     Glucose, Random 01/15/2020 117*    BUN 01/15/2020 23     Creatinine 01/15/2020 1 55*    eGFR Non  01/15/2020 33*    eGFR  01/15/2020 38*    SL AMB BUN/CREATININE RA* 01/15/2020 15     Sodium 01/15/2020 139     Potassium 01/15/2020 4 3     Chloride 01/15/2020 100     CO2 01/15/2020 21     CALCIUM 01/15/2020 9 7     Protein, Total 01/15/2020 7 0     Albumin 01/15/2020 4 5     Globulin, Total 01/15/2020 2 5     Albumin/Globulin Ratio 01/15/2020 1 8     TOTAL BILIRUBIN 01/15/2020 0 3     Alk Phos Isoenzymes 01/15/2020 103     AST 01/15/2020 16     ALT 01/15/2020 16     Specific Gravity 01/15/2020 1 011     Ph 01/15/2020 6 5     Color UA 01/15/2020 Red*    Urine Appearance 01/15/2020 Clear     Leukocyte Esterase 01/15/2020 Trace*    Protein 01/15/2020 2+*    Glucose, 24 HR Urine 01/15/2020 Negative     Ketone, Urine 01/15/2020 Negative     Blood, Urine 01/15/2020 3+*    Bilirubin, Urine 01/15/2020 Negative     Urobilinogen Urine 01/15/2020 0 2     SL AMB NITRITES URINE, Q* 01/15/2020 Negative     Microscopic Examination 01/15/2020 See below:     Urinalysis Reflex 01/15/2020 Comment     SL AMB WBC, URINE 01/15/2020 0-5     RBC, Urine 01/15/2020 >30*    Epithelial Cells (non re* 01/15/2020 0-10     Bacteria, Urine 01/15/2020 Few     Urine Culture Result 01/15/2020 Final report     Result 1 01/15/2020 No growth     Creatinine, Urine 01/15/2020 55 4     Microalbum  ,U,Random 01/15/2020 522 8     Microalb/Creat Ratio 01/15/2020 943 7*    Hemoglobin A1C 01/15/2020 5 9*    25-HYDROXY VIT D 01/15/2020 30 9     TSH 01/15/2020 2 880     Uric Acid 01/15/2020 7 2*    Magnesium, Serum 01/15/2020 1 9    Transcribe Orders on 11/08/2019   Component Date Value    Case Report 11/08/2019                      Value:Non-gynecologic Cytology                          Case: [id number]                                  Authorizing Provider:  Airam Thompson MD        Collected:           11/08/2019 1103              Ordering Location:     [address] [place] [address]      Received: 11/08/2019 1103                                     Laboratory Services                                                          Pathologist:           Michel Arroyo MD                                                   Specimen:    Urine, Other                                                                               Final Diagnosis 11/08/2019                      Value: This result contains rich text formatting which cannot be displayed here  [place] Gross Description 11/08/2019                      Value: This result contains rich text formatting which cannot be displayed here   Additional Information 11/08/2019                      Value: This result contains rich text formatting which cannot be displayed here  Hospital Outpatient Visit on 11/08/2019   Component Date Value    Protocol Name 11/08/2019 LEXISCAN-SIT     Time In Exercise Phase 11/08/2019 00:03:00     MAX   SYSTOLIC BP 54/16/2244 209     Max Diastolic Bp 47/34/5073 84     Max Heart Rate 11/08/2019 84     Max Predicted Heart Rate 11/08/2019 148     Reason for Termination 11/08/2019 Protocol Complete     Test Indication 11/08/2019 Abnormal ECG     Target Hr Formular 11/08/2019 (220 - Age)*85%    Office Visit on 11/04/2019   Component Date Value    Ventricular Rate 11/04/2019 63     Atrial Rate 11/04/2019 63     MI Interval 11/04/2019 174     QRSD Interval 11/04/2019 84     QT Interval 11/04/2019 402     QTC Interval 11/04/2019 411     P Axis 11/04/2019 64     QRS Axis 11/04/2019 2     T Wave [place] 11/04/2019 102    Appointment on 11/04/2019   Component Date Value    Sodium 11/04/2019 143     Potassium 11/04/2019 4 7     Chloride 11/04/2019 105     CO2 11/04/2019 30     ANION GAP 11/04/2019 8     BUN 11/04/2019 24     Creatinine 11/04/2019 1 56*    Glucose, Fasting 11/04/2019 171*    Calcium 11/04/2019 9 9     eGFR 11/04/2019 33     WBC 11/04/2019 8 32     RBC 11/04/2019 5 17*    Hemoglobin 11/04/2019 14 9     Hematocrit 11/04/2019 46 4*    MCV 11/04/2019 90     MCH 11/04/2019 28 8     MCHC 11/04/2019 32 1     RDW 11/04/2019 13 3     MPV 11/04/2019 10 7     Platelets 15/87/4851 258     nRBC 11/04/2019 0     Neutrophils Relative 11/04/2019 65     Immat GRANS % 11/04/2019 1     Lymphocytes Relative 11/04/2019 22     Monocytes Relative 11/04/2019 8     Eosinophils Relative 11/04/2019 3     Basophils Relative 11/04/2019 1     Neutrophils Absolute 11/04/2019 5 42     Immature Grans Absolute 11/04/2019 0 05     Lymphocytes Absolute 11/04/2019 1 86     Monocytes Absolute 11/04/2019 0 67     Eosinophils Absolute 11/04/2019 0 26     Basophils Absolute 11/04/2019 0 06     Hemoglobin A1C 11/04/2019 5 7     EAG 11/04/2019 117     Urine Culture 11/08/2019 30,000-39,000 cfu/ml      Creatinine, Ur 11/04/2019 144 0     Microalbum  ,U,Random 11/04/2019 1,140 0*    Microalb Creat Ratio 11/04/2019 792*   Procedure visit on 10/31/2019   Component Date Value    LEUKOCYTE ESTERASE,UA 10/31/2019 NEG     NITRITE,UA 10/31/2019 NEG     SL AMB POCT UROBILINOGEN 10/31/2019 NEG     POCT URINE PROTEIN 10/31/2019 +++      PH,UA 10/31/2019 6     BLOOD,UA 10/31/2019 NEG     SPECIFIC GRAVITY,UA 10/31/2019 1 020     KETONES,UA 10/31/2019 Trace     BILIRUBIN,UA 10/31/2019 NEG     GLUCOSE, UA 10/31/2019 NEG      COLOR,UA 10/31/2019 Yellow     CLARITY,UA 10/31/2019 Clear     Clarity, UA 11/08/2019 Slightly Cloudy     Color, UA 11/08/2019 Yellow     Specific Gravity, UA 11/08/2019 1 010     pH, UA 11/08/2019 6 0     Glucose, UA 11/08/2019 Negative     Ketones, UA 11/08/2019 Negative     Blood, UA 11/08/2019 Large*    Protein, UA 11/08/2019 100 (2+)*    Nitrite, UA 11/08/2019 Negative     Bilirubin, UA 11/08/2019 Negative     Urobilinogen, UA 11/08/2019 0 2     Leukocytes, UA 11/08/2019 Negative     WBC, UA 11/08/2019 2-4*    RBC, UA 11/08/2019 30-50*    Bacteria, UA 11/08/2019 Occasional     Epithelial Cells 11/08/2019 Occasional     Color, UA 10/31/2019 Yellow     Clarity, UA 10/31/2019 Clear     Specific Gravity, UA 10/31/2019 1 020     pH, UA 10/31/2019 6 0     Leukocytes, UA 10/31/2019 Negative     Nitrite, UA 10/31/2019 Negative     Protein, UA 10/31/2019 100 (2+)*    Glucose, UA 10/31/2019 Negative     Ketones, UA 10/31/2019 Negative     Urobilinogen, UA 10/31/2019 0 2     Bilirubin, UA 10/31/2019 Negative     Blood, UA 10/31/2019 Moderate*    Case Report 10/31/2019                      Value:Non-gynecologic Cytology                          Case: [id number]                                  Authorizing Provider:  Suellen Ortega MD        Collected:           10/31/2019 1440              Ordering Location:     [address] [place] [address] For        Received:            10/31/2019 1440                                     Urology [facility]                                                         Pathologist:           Debbie Diego MD                                                                 Specimen:    Urine, Clean Catch                                                                         Final Diagnosis 10/31/2019                      Value: This result contains rich text formatting which cannot be displayed here  Christos Pinto Gross Description 10/31/2019                      Value: This result contains rich text formatting which cannot be displayed here   Additional Information 10/31/2019                      Value: This result contains rich text formatting which cannot be displayed here      RBC, UA 10/31/2019 None Seen     WBC, UA 10/31/2019 None Seen     Epithelial Cells 10/31/2019 None Seen     Bacteria, UA 10/31/2019 Occasional     Hyaline Casts, UA 10/31/2019 None Seen    Orders Only on 10/25/2019   Component Date Value    Specific Gravity 10/25/2019 1 013     Ph 10/25/2019 6 0     Color UA 10/25/2019 Yellow     Urine Appearance 10/25/2019 Clear     Leukocyte Esterase 10/25/2019 Negative     Protein 10/25/2019 2+*    Glucose, 24 HR Urine 10/25/2019 Negative     Ketone, Urine 10/25/2019 Negative     Blood, Urine 10/25/2019 1+*    Bilirubin, Urine 10/25/2019 Negative     Urobilinogen Urine 10/25/2019 0 2     SL AMB NITRITES URINE, Q* 10/25/2019 Negative     Microscopic Examination 10/25/2019 See below:     Urinalysis Reflex 10/25/2019 Comment     SL AMB WBC, URINE 10/25/2019 0-5     RBC, Urine 10/25/2019 0-2     Epithelial Cells (non re* 10/25/2019 0-10     Bacteria, Urine 10/25/2019 None seen          Imaging  @[email or web address]@  Recent Results (from the past 20074 hour(s))   NM myocardial perfusion spect (rx stress and/or rest)    [place] [address]  [phone]    Rest/Stress Gated SPECT Myocardial Perfusion Imaging After Regadenoson    Patient: Natalie Barry  MR number: [id number]  Account number: [de-identified]  : 1945  Age: 69 years  Gender: Female  Status: Outpatient  Location: Stress lab  Height: 58 in  Weight: 163 lb  BP: 206/ 84 mmHg    Allergies: ATORVASTATIN, COLESEVELAM, COLESTIPOL, EZETIMIBE, FENOFIBRATE, POLLEN EXTRACT, ROSUVASTATIN, STATINS    Diagnosis: R94 31 - Abnormal electrocardiogram [ECG] [EKG]    Primary Physician:  Gwen Hunt DO  Referring Physician:  Gwen Hunt DO  Group:  [facility]  Other:  Claribel Hernandez MS, CCT  Interpreting Physician:  Phan Villafuerte MD    INDICATIONS: Detection of coronary artery disease  Pre-operative risk assessment  HISTORY: The patient is a 69year old  female  Chest pain status: no chest pain  Coronary artery disease risk factors: dyslipidemia, hypertension, smoking, diabetes mellitus, and post-menopausal state  Cardiovascular history:  peripheral vascular occlusive disease, stroke, and transient ischemic attack  Co-morbidity: history of renal disease and obesity  GERD, renal and celiac stenosis   Medications: a beta blocker, a calcium channel blocker, an ACE  inhibitor/ARB, clopidogrel, and an antihypertensive agent  Previous test results: abnormal ECG and hyperlipidemia  PHYSICAL EXAM: Baseline physical exam screening: normal     REST ECG: Normal sinus rhythm  Nonspecific ST and T wave abnormalities were present  PROCEDURE: The study was performed in the the Stress lab  A regadenoson infusion pharmacologic stress test was performed  Gated SPECT myocardial perfusion imaging was performed after stress and at rest  Systolic blood pressure was 206  mmHg, at the start of the study  Diastolic blood pressure was 84 mmHg, at the start of the study  The heart rate was 63 bpm, at the start of the study  Regadenoson protocol:  HR bpm SBP mmHg DBP mmHg Symptoms Rhythm/conduct  Baseline 63 206 84 none NSR, no ectopy  Immediate 83 126 84 mild dyspnea, nausea NSR, no ectopy  1 min 76 -- -- none NSR, no ectopy  2 min 71 184 78 none NSR, no ectopy  Rest SpO2 98, Peak Stress SpO2 99    STRESS SUMMARY: Duration of pharmacologic stress was 3 min  Maximal heart rate during stress was 83 bpm  The rate-pressure product for the peak heart rate and blood pressure was 66303  There was no chest pain during stress  The stress test  was terminated due to protocol completion  The stress ECG was negative for ischemia and normal     ISOTOPE ADMINISTRATION:  Resting isotope administration Stress isotope administration  Agent Tetrofosmin Tetrofosmin  Dose 9 9 mCi 29 6 mCi  Date 11/08/2019 11/08/2019  Injection-image interval 30 min 30 min    The radiopharmaceutical was injected at the peak effect of pharmacologic stress  MYOCARDIAL PERFUSION IMAGING:  The image quality was good  PERFUSION DEFECTS:  -  There was a small, mildly severe, partially reversible myocardial perfusion defect of anterior and inferior wall  -  There was of the inferior and anterior wall  GATED SPECT:  The calculated left ventricular ejection fraction was 61 %   There was no left ventricular regional abnormality  SUMMARY:  -  Stress results: There was no chest pain during stress  -  ECG conclusions: The stress ECG was negative for ischemia and normal   -  Perfusion imaging: There was a small, mildly severe, partially reversible myocardial perfusion defect of anterior and inferior wall There was of the inferior and anterior wall  -  Gated SPECT: The calculated left ventricular ejection fraction was 61 %  There was no left ventricular regional abnormality  Prepared and signed by    Munira Patino MD  Signed 2019 17:53:08     Echo complete with contrast if indicated    Narrative    [address]  [place], [place] [address]  [phone]    Transthoracic Echocardiogram  2D, M-mode, Doppler, and Color Doppler    Study date:  2019    Patient: Yulia Hamilton  MR number: [id number]  Account number: [de-identified]  : 15-Juma-1945  Age: 69 years  Gender: Female  Status: Outpatient  Location: Echo lab  Height: 58 in  Weight: 163 lb  BP: 216/ 86 mmHg    Indications: Abnormal EKG, Assess left ventricular function  Diagnoses: R94 31 - Abnormal electrocardiogram [ECG] [EKG]    Sonographer:   [facility] Avani Bates  Referring Physician:  Robbi Ross DO  Group:  [facility]  Interpreting Physician:  Munira Patino MD    SUMMARY    LEFT VENTRICLE:  Systolic function was normal  Ejection fraction was estimated to be 60 %  There were no regional wall motion abnormalities  Wall thickness was mildly increased  Doppler parameters were consistent with abnormal left ventricular relaxation (grade 1 diastolic dysfunction)  Doppler parameters were consistent with elevated ventricular end-diastolic filling pressure  LEFT ATRIUM:  The atrium was mildly dilated  MITRAL VALVE:  There was mild annular calcification  There was mild regurgitation  TRICUSPID VALVE:  There was trace regurgitation      AORTA:  The root exhibited normal size and mild fibrocalcific change  HISTORY: Consistent with transient ischemic attack or stroke  PRIOR HISTORY: CKD, Risk factors: hypertension, diabetes, and hypercholesterolemia  PROCEDURE: The procedure was performed in the echo lab  This was a routine study  The transthoracic approach was used  The study included complete 2D imaging, M-mode, complete spectral Doppler, and color Doppler  The heart rate was 60 bpm,  at the start of the study  Images were obtained from the parasternal, apical, subcostal, and suprasternal notch acoustic windows  Image quality was adequate  LEFT VENTRICLE: Size was normal  Systolic function was normal  Ejection fraction was estimated to be 60 %  There were no regional wall motion abnormalities  Wall thickness was mildly increased  No evidence of apical thrombus  DOPPLER:  Doppler parameters were consistent with abnormal left ventricular relaxation (grade 1 diastolic dysfunction)  Doppler parameters were consistent with elevated ventricular end-diastolic filling pressure  RIGHT VENTRICLE: The size was normal  Systolic function was normal  Wall thickness was normal     LEFT ATRIUM: The atrium was mildly dilated  RIGHT ATRIUM: Size was normal     MITRAL VALVE: There was mild annular calcification  Valve structure was normal  There was mild-moderate calcification  There was mildly reduced leaflet separation  DOPPLER: The transmitral velocity was within the normal range  There was no  evidence for stenosis  There was mild regurgitation  AORTIC VALVE: The valve was trileaflet  Leaflets exhibited normal thickness and normal cuspal separation  DOPPLER: Transaortic velocity was within the normal range  There was no evidence for stenosis  There was no significant  regurgitation  TRICUSPID VALVE: The valve structure was normal  There was normal leaflet separation  DOPPLER: The transtricuspid velocity was within the normal range   There was no evidence for stenosis  There was trace regurgitation  PULMONIC VALVE: Leaflets exhibited normal thickness, no calcification, and normal cuspal separation  DOPPLER: The transpulmonic velocity was within the normal range  There was no significant regurgitation  PERICARDIUM: There was no pericardial effusion  The pericardium was normal in appearance  AORTA: The root exhibited normal size and mild fibrocalcific change  SYSTEMIC VEINS: IVC: The inferior vena cava was normal in size  SYSTEM MEASUREMENT TABLES    2D  %FS: 30 9 %  Ao Diam: 2 7 cm  EDV(Teich): 80 7 ml  EF(Teich): 58 9 %  ESV(Teich): 33 1 ml  IVSd: 1 1 cm  LA Area: 17 cm2  LA Diam: 3 9 cm  LVEDV MOD A4C: 115 5 ml  LVEF MOD A4C: 60 5 %  LVESV MOD A4C: 45 6 ml  LVIDd: 4 2 cm  LVIDs: 2 9 cm  LVLd A4C: 8 cm  LVLs A4C: 6 7 cm  LVPWd: 1 1 cm  RA Area: 15 1 cm2  RVIDd: 2 9 cm  SV MOD A4C: 69 9 ml  SV(Teich): 47 5 ml    MM  TAPSE: 2 1 cm    PW  E': 0 m/s  E/E': 27 9  MV A Carlos A: 1 5 m/s  MV Dec Will: 3 4 m/s2  MV DecT: 293 8 ms  MV E Carlos A: 1 m/s  MV E/A Ratio: 0 7  MV PHT: 85 2 ms  MVA By PHT: 2 6 cm2    [employer] Accredited Echocardiography Laboratory    Prepared and electronically signed by    Gama Alicia MD  Signed [id number] 17:32:58     ECG 12 lead   Result Value    Ventricular Rate 63    Atrial Rate 63    FL Interval 174    QRSD Interval 84    QT Interval 402    QTC Interval 411    P Axis 64    QRS Axis 2    T Wave Axis 102    Narrative    Normal sinus rhythm  Minimal voltage criteria for LVH, may be normal variant  Possible Inferior infarct , age undetermined  Anterior infarct , age undetermined  Confirmed by Geovanny Epps ([id number]) on 11/4/2019 12:51:00 PM     No orders to display     No results found for this or any previous visit  Physical Exam   Constitutional: She is oriented to person, place, and time  She appears well-developed     HENT:   Right Ear: External ear normal    Left Ear: External ear normal    Eyes: Right eye exhibits no discharge  Left eye exhibits no discharge  No scleral icterus  Neck: Carotid bruit is not present  No tracheal deviation present  No thyroid mass and no thyromegaly present  Cardiovascular: Normal rate, regular rhythm, normal heart sounds and intact distal pulses  Exam reveals no gallop and no friction rub  No murmur heard  Pulmonary/Chest: No respiratory distress  She has no wheezes  She has no rales  Musculoskeletal: She exhibits no edema  Lymphadenopathy:     She has no cervical adenopathy  Neurological: She is alert and oriented to person, place, and time  Coordination normal    Psychiatric: She has a normal mood and affect  Her behavior is normal  Judgment and thought content normal    Nursing note and vitals reviewed

## 2020-02-05 NOTE — PATIENT INSTRUCTIONS
1 patient with ground-glass nodule 7 mm in the right upper lobe has appoint with Dr Calli Miller to decide whether biopsy or follow-up is indicated  2  Patient with failed stress test is seeing Dr Ed Lopez for hopefully to schedule catheterization and talk about PSK9 medications such as Repatha or Praluent  3  Tobacco abuse will use inhaler to take as needed and wean off of cigarettes and then wean off the inhaler  4  Chronic kidney disease is mild has good follow-up with Dr Reta Morrison  5  Diffuse vascular disease different blood pressure right left arm tobacco cessation is encouraged  6   Type 2 diabetes will see her back in 2 months  She can cancel her appointment Dr Corinne Hoar

## 2020-02-07 ENCOUNTER — TELEPHONE (OUTPATIENT)
Dept: CARDIOLOGY CLINIC | Facility: CLINIC | Age: 75
End: 2020-02-07

## 2020-02-07 ENCOUNTER — OFFICE VISIT (OUTPATIENT)
Dept: CARDIOLOGY CLINIC | Facility: CLINIC | Age: 75
End: 2020-02-07
Payer: COMMERCIAL

## 2020-02-07 VITALS
WEIGHT: 165 LBS | SYSTOLIC BLOOD PRESSURE: 142 MMHG | OXYGEN SATURATION: 98 % | BODY MASS INDEX: 34.63 KG/M2 | HEIGHT: 58 IN | HEART RATE: 68 BPM | DIASTOLIC BLOOD PRESSURE: 88 MMHG

## 2020-02-07 DIAGNOSIS — E78.2 HYPERLIPEMIA, MIXED: ICD-10-CM

## 2020-02-07 DIAGNOSIS — R06.02 SHORTNESS OF BREATH: ICD-10-CM

## 2020-02-07 DIAGNOSIS — F17.200 SMOKING: ICD-10-CM

## 2020-02-07 DIAGNOSIS — I10 HYPERTENSION, ESSENTIAL: Primary | ICD-10-CM

## 2020-02-07 DIAGNOSIS — N18.30 STAGE 3 CHRONIC KIDNEY DISEASE (HCC): ICD-10-CM

## 2020-02-07 PROCEDURE — 4040F PNEUMOC VAC/ADMIN/RCVD: CPT | Performed by: INTERNAL MEDICINE

## 2020-02-07 PROCEDURE — 1160F RVW MEDS BY RX/DR IN RCRD: CPT | Performed by: INTERNAL MEDICINE

## 2020-02-07 PROCEDURE — 99214 OFFICE O/P EST MOD 30 MIN: CPT | Performed by: INTERNAL MEDICINE

## 2020-02-07 PROCEDURE — 3079F DIAST BP 80-89 MM HG: CPT | Performed by: INTERNAL MEDICINE

## 2020-02-07 PROCEDURE — 3066F NEPHROPATHY DOC TX: CPT | Performed by: INTERNAL MEDICINE

## 2020-02-07 PROCEDURE — 3077F SYST BP >= 140 MM HG: CPT | Performed by: INTERNAL MEDICINE

## 2020-02-07 PROCEDURE — 1170F FXNL STATUS ASSESSED: CPT | Performed by: INTERNAL MEDICINE

## 2020-02-07 NOTE — TELEPHONE ENCOUNTER
----- Message from Dwaine Zazueta MD sent at 2/7/2020  1:25 PM EST -----  Regarding: Schedule cardiac catheterization at [facility]  Please schedule this patient for cardiac catheterization at [facility] in the next 1 to 2 weeks  She does have CKD and needs the hydration protocol  Patient should stop her losartan the night before as well as in the morning of the procedure  She is going to get repeat blood work early next week  Please let me know if you have any questions  Thank you

## 2020-02-07 NOTE — PROGRESS NOTES
[facility] [place] [id number] [place] [address]  Cardiology Follow Up    Sony Zhu  1945  [id number]      1  Hypertension, essential     2  Hyperlipemia, mixed     3  Smoking     4  Shortness of breath     5  Stage 3 chronic kidney disease [facility])         Chief Complaint   Patient presents with    Cardiac Risk Assessmnet     Bladder tumor removal w/ Dr James James No dates scheduled    Results     patient had EKG/echo/stress test 11/2019       Interval History:  Patient presents for follow-up visit  Patient was recommended to have cardiac catheterization in November of 2019  Patient wanted to see her nephrologist   She was seen by her nephrologist   Patient wishes to proceed with cardiac catheterization with clearly understanding the risks associated with the procedure including dye induced contrast nephropathy  Patient was seen by primary care physician recently  Unfortunately patient continues to smoke  Patient does have shortness of breath with exertion  Patient has multiple risk factors for coronary artery disease  She states that she has been compliant with all her present medications  Patient Active Problem List   Diagnosis    Spinal stenosis    Basilar artery stenosis    Breast mass    Cerebrovascular accident (CVA) ([address] )    Chronic GERD    Kidney disease, chronic, stage III (moderate, EGFR 30-59 ml/min) ([facility])    Claudication in peripheral vascular disease ([address] )    Depression with anxiety    Type 2 diabetes mellitus with diabetic nephropathy (HCC)    Endometriosis    Gout    Hx-TIA (transient ischemic attack)    Hypercholesteremia    Hyperlipidemia associated with type 2 diabetes mellitus ([address] )    Hypertension    Hypertension associated with diabetes ([address] )    Osteoarthritis    Obesity (BMI 30-39  9)    Polyneuropathy    Right renal artery stenosis (HCC)    Tobacco use disorder    Vertebrobasilar artery syndrome    Vitamin D deficiency    Statin intolerance    Lumbar disc herniation    Pain of left lower extremity    Abnormal EKG    Spondylosis of lumbar region without myelopathy or radiculopathy    Aortoiliac stenosis, left (HCC)    Hypokalemia    Acute drug-induced gout of left foot    Gross hematuria on tissue paper after wiping    Decreased pulses in feet    Refused influenza vaccine    Hypercalcemia    Lumbosacral spondylosis without myelopathy    Neurodermatitis    Other proteinuria    Obesity with body mass index 30 or greater    Hyperlipidemia, unspecified    Herniated lumbar intervertebral disc    Atherosclerosis of renal artery ([facility])    Bladder tumor    Celiac artery stenosis (HCC) >70% stenosis in the celiac trunk    Abnormal CT of the chest suspicious for an infectious or inflammatory bronchiolitis   Positive cardiac stress test  small, mildly severe, partially reversible myocardial perfusion defect of anterior and inferior wall     Femoral artery stenosis, right (HCC) 50-75% stenosis in the common femoral artery      Superior mesenteric artery stenosis (HCC) left    Immunization not carried out because of patient refusal    Median arcuate ligament syndrome ([facility]    Abdominal bruit    Initial Medicare annual wellness visit     Past Medical History:   Diagnosis Date    Arthritis     Coronary artery disease     Diabetes mellitus ([facility]     Endometriosis     High cholesterol     Hypertension     Kidney disease, chronic, stage III (moderate, EGFR 30-59 ml/min) ([facility])     Lumbar disc herniation     Peripheral vascular disease ([facility])     Shoulder injury     left    Spinal stenosis     Stroke [facility])      Social History     Socioeconomic History    Marital status: /Civil Union     Spouse name: Not on file    Number of children: Not on file    Years of education: Not on file    Highest education level: Not on file   Occupational History    Not on file   Social Needs    Financial resource strain: Not on file    Food insecurity:     Worry: Not on file     Inability: Not on file    Transportation needs:     Medical: Not on file     Non-medical: Not on file   Tobacco Use    Smoking status: Current Every Day Smoker     Packs/day: 1 00     Types: Cigarettes    Smokeless tobacco: Never Used   Substance and Sexual Activity    Alcohol use: No    Drug use: No    Sexual activity: Not Currently     Partners: Male   Lifestyle    Physical activity:     Days per week: Not on file     Minutes per session: Not on file    Stress: Not on file   Relationships    Social connections:     Talks on phone: Not on file     Gets together: Not on file     Attends [employer] service: Not on file     Active member of club or organization: Not on file     Attends meetings of clubs or organizations: Not on file     Relationship status: Not on file    Intimate partner violence:     Fear of current or ex partner: Not on file     Emotionally abused: Not on file     Physically abused: Not on file     Forced sexual activity: Not on file   Other Topics Concern    Not on file   Social History Narrative    Occasional Caffeine Consumption      Family History   Problem Relation Age of Onset    Hypertension Mother     Heart disease Mother         Valvular    Hyperlipidemia Mother     Hypertension Father     Heart defect Father         Cardiomegaly    Stroke Sister         Cerebrovascular Accident    Arthritis Brother     Other Brother         Back Disorder     Past Surgical History:   Procedure Laterality Date    FRACTURE SURGERY      OVARIAN CYST SURGERY      ROTATOR CUFF REPAIR Left     TONSILLECTOMY         Current Outpatient Medications:     amLODIPine (NORVASC) 5 mg tablet, Take 1 tablet (5 mg total) by mouth 2 (two) times a day, Disp: 60 tablet, Rfl: 11    clopidogrel (PLAVIX) 75 mg tablet, Take 1 tablet (75 mg total) by mouth daily, Disp: 90 tablet, Rfl: 1    Icosapent Ethyl (VASCEPA) 1 g CAPS, 2 cap bid with meals, Disp: 360 capsule, Rfl: 2    labetalol (NORMODYNE) 200 mg tablet, Take 1 tablet (200 mg total) by mouth 2 (two) times a day, Disp: 180 tablet, Rfl: 2    losartan (COZAAR) 100 MG tablet, Take 100 mg by mouth every morning , Disp: , Rfl:     losartan (COZAAR) 50 mg tablet, Take 1 tablet by mouth daily  , Disp: , Rfl:     nicotine (NICOTROL) 10 MG inhaler, Inhale 1 puff as needed for smoking cessation, Disp: 42 each, Rfl: 5    traMADol (ULTRAM) 50 mg tablet, Take 1 tablet (50 mg total) by mouth every 8 (eight) hours as needed for moderate pain, Disp: 60 tablet, Rfl: 1  Allergies   Allergen Reactions    Atorvastatin Dizziness and Myalgia     Other reaction(s): Dizziness/Myalgia    Colesevelam      Other reaction(s): leg pains    Colestipol      Other reaction(s): Swelling, Itching    Ezetimibe      Other reaction(s): heartburn  Other reaction(s): Heart Burn    Fenofibrate      Other reaction(s): blood in urine  hx  Kidney Failure    Pollen Extract     Rosuvastatin      Other reaction(s): Leg Cramping    Statins Myalgia       Labs:  Orders Only on 02/04/2020   Component Date Value    HEP C AB 02/04/2020 <0 1     Interpretation: 02/04/2020 Comment    Orders Only on 01/15/2020   Component Date Value    Class Description 01/15/2020 Comment     D  PTERONYSSINUS IGE 01/15/2020 <0 10     D  FARINAE 01/15/2020 <0 10     Cat Dander 01/15/2020 <0 10     DOG DANDER IGE 01/15/2020 <0 10     BERMUDA GRASS IGE 01/15/2020 <0 10     Blue [place] 01/15/2020 <0 10     Nima Grass 01/15/2020 <0 10     [address] [place] 01/15/2020 <0 10     M192-CvN Gabby Penning* 01/15/2020 <0 10     PENICILLIUM CHRYSOGENUM 01/15/2020 <0 10     Cladosporium Herbarum 01/15/2020 <0 10     Aspergillus fumigatus 01/15/2020 <0 10     Mucor Racemosus 01/15/2020 <0 10     Alternaria Alternata 01/15/2020 <0 10     S  HERBARUM 01/15/2020 <0 10    Cathren Nicks 01/15/2020 <0 10     T007 [place] 01/15/2020 <0 10     [place] 01/15/2020 <0 10  MAPLE/BOX ELDER IGE 01/15/2020 <0 10     [place] 01/15/2020 <0 10     T070 [place] 01/15/2020 <0 10     M[place] 01/15/2020 <0 10     Short Ragwd(A jocy ) * 01/15/2020 <0 10     Plantain 01/15/2020 <0 10     COMMON PIGWEED 01/15/2020 <0 10     [place] 01/15/2020 <0 10     Aspergillus fumigatus 3 * 01/15/2020 Negative     Aspergillus fumigatus 2 * 01/15/2020 Negative     Saccharomonospora Viridi* 01/15/2020 Negative     Thermoactinomyces Candid* 01/15/2020 Negative     A  Fumigatus #1 Abs 01/15/2020 Negative     Aspergillus fumigatus 6 * 01/15/2020 Negative     A  Pullulans Abs 01/15/2020 Negative     M  FAENI ABS 01/15/2020 Negative     [place] Serum Abs 01/15/2020 Negative     T VULGARIS#1 01/15/2020 Negative    Orders Only on 01/15/2020   Component Date Value    White Blood Cell Count 01/15/2020 8 1     Red Blood Cell Count 01/15/2020 5 06     Hemoglobin 01/15/2020 15 2     HCT 01/15/2020 43 5     MCV 01/15/2020 86     MCH 01/15/2020 30 0     MCHC 01/15/2020 34 9     RDW 01/15/2020 14 1     Platelet Count 21/80/2309 261     Neutrophils 01/15/2020 62     Lymphocytes 01/15/2020 24     Monocytes 01/15/2020 9     Eosinophils 01/15/2020 3     Basophils PCT 01/15/2020 1     Neutrophils (Absolute) 01/15/2020 5 1     Lymphocytes (Absolute) 01/15/2020 1 9     Monocytes (Absolute) 01/15/2020 0 7     Eosinophils (Absolute) 01/15/2020 0 3     Basophils ABS 01/15/2020 0 0     Immature Granulocytes 01/15/2020 1     Immature Granulocytes (A* 01/15/2020 0 0     Glucose, Random 01/15/2020 117*    BUN 01/15/2020 23     Creatinine 01/15/2020 1 55*    eGFR Non  01/15/2020 33*    eGFR  01/15/2020 38*    SL AMB BUN/CREATININE RA* 01/15/2020 15     Sodium 01/15/2020 139     Potassium 01/15/2020 4 3     Chloride 01/15/2020 100     CO2 01/15/2020 21     CALCIUM 01/15/2020 9 7     Protein, Total 01/15/2020 7 0     Albumin 01/15/2020 4 5     Globulin, Total 01/15/2020 2 5     Albumin/Globulin Ratio 01/15/2020 1 8     TOTAL BILIRUBIN 01/15/2020 0 3     Alk Phos Isoenzymes 01/15/2020 103     AST 01/15/2020 16     ALT 01/15/2020 16     Specific Gravity 01/15/2020 1 011     Ph 01/15/2020 6 5     Color UA 01/15/2020 Red*    Urine Appearance 01/15/2020 Clear     Leukocyte Esterase 01/15/2020 Trace*    Protein 01/15/2020 2+*    Glucose, 24 HR Urine 01/15/2020 Negative     Ketone, Urine 01/15/2020 Negative     Blood, Urine 01/15/2020 3+*    Bilirubin, Urine 01/15/2020 Negative     Urobilinogen Urine 01/15/2020 0 2     SL AMB NITRITES URINE, Q* 01/15/2020 Negative     Microscopic Examination 01/15/2020 See below:     Urinalysis Reflex 01/15/2020 Comment     SL AMB WBC, URINE 01/15/2020 0-5     RBC, Urine 01/15/2020 >30*    Epithelial Cells (non re* 01/15/2020 0-10     Bacteria, Urine 01/15/2020 Few     Urine Culture Result 01/15/2020 Final report     Result 1 01/15/2020 No growth     Creatinine, Urine 01/15/2020 55 4     Microalbum  ,U,Random 01/15/2020 522 8     Microalb/Creat Ratio 01/15/2020 943 7*    Hemoglobin A1C 01/15/2020 5 9*    25-HYDROXY VIT D 01/15/2020 30 9     TSH 01/15/2020 2 880     Uric Acid 01/15/2020 7 2*    Magnesium, Serum 01/15/2020 1 9      Imaging: Xr Sinuses Routine 3+ Views    Result Date: 2/1/2020  Narrative: PARANASAL SINUSES INDICATION:  R09 81: Nasal congestion  COMPARISON:  None VIEWS:  XR SINUSES ROUTINE 3+ VIEWS FINDINGS: No evidence of sinus opacification or air-fluid levels  No lytic or blastic lesions are identified  Impression: No evidence of sinusitis  Workstation performed: [id number]     Ct Chest Without Contrast    Result Date: 1/29/2020  Narrative: CT CHEST WITHOUT IV CONTRAST INDICATION:   R93 89: Abnormal findings on diagnostic imaging of other specified body structures  Extensive smoking history abnormal chest CT for lung cancer screening  Dry nonproductive cough    Intermittent sinus congestion  COMPARISON:  Chest CT from 10/29/2019  Abdomen CT from 2/10/2011 which includes the lower lungs  TECHNIQUE: CT examination of the chest was performed without intravenous contrast   Axial, sagittal, and coronal 2D reformatted images were created from the source data and submitted for interpretation  Radiation dose length product (DLP) for this visit:  195 mGy-cm   This examination, like all CT scans performed in the [facility], was performed utilizing techniques to minimize radiation dose exposure, including the use of iterative reconstruction and automated exposure control  FINDINGS: LUNGS:  Multiple 5 mm and smaller nodules within upper lung predominance, unchanged since 10/29/2019  The abdominal CT from February 2011 shows several nodules in the lower lungs  Indeterminate 7 mm groundglass opacity in the right apex, unchanged since October 2019 (3/22)  New 5 mm juxtapleural nodule in the left lower lobe since October 2019, likely a benign intrapulmonary lymph node (3/49)  6 mm juxtapleural paraspinal left lower lobe nodule, unchanged since February 2011 (3/71)  PLEURA:  Unremarkable  HEART/GREAT VESSELS:  Moderate coronary artery calcification indicating atherosclerotic heart disease  MEDIASTINUM AND JERMAIN:  Unremarkable  CHEST WALL AND LOWER NECK:   Unremarkable  VISUALIZED STRUCTURES IN THE UPPER ABDOMEN:  Unremarkable  OSSEOUS STRUCTURES:  Moderate degenerative disease in the spine  Impression: Multiple 5 mm and smaller nodules within upper lung predominance, unchanged since October 2019  Multiple nodules are visible in the mid and lower lungs on the abdominal CT from February 2011  This is likely a chronic inflammatory process  7 mm groundglass opacity in the right upper lobe, unchanged since October 2019  This could represent a slowly growing lung cancer    The guideline for groundglass nodules of this size is to continue annual low dose chest CT for lung cancer screening  The study was marked in EPIC for significant notification  Workstation performed: [id number]       Review of Systems:  Review of Systems   REVIEW OF SYSTEMS:  Constitutional:  Denies fever or chills   Eyes:  Denies change in visual acuity   HENT:  Denies nasal congestion or sore throat   Respiratory:   shortness of breath   Cardiovascular:  Denies chest pain or edema   GI:  Denies abdominal pain, nausea, vomiting, bloody stools or diarrhea   :  Denies dysuria, frequency, difficulty in micturition and nocturia  Musculoskeletal:  Denies back pain or joint pain   Neurologic:  Denies headache, focal weakness or sensory changes   Endocrine:  Denies polyuria or polydipsia   Lymphatic:  Denies swollen glands   Psychiatric:  Denies depression or anxiety     Physical Exam:    /88   Pulse 68   Ht 4' 10" (1 473 m)   Wt 74 8 kg (165 lb)   LMP  (LMP Unknown)   SpO2 98%   BMI 34 49 kg/m²     Physical Exam   PHYSICAL EXAM:  General:  Patient is not in acute distress   Head: Normocephalic, Atraumatic  HEENT:  Both pupils normal-size atraumatic, normocephalic, nonicteric  Neck:  JVP not raised  Trachea central  No carotid bruit  Respiratory:  Decreased breath sounds bilaterally  Cardiovascular:  Regular rate and rhythm no S3 no murmurs  GI:  Abdomen soft nontender  No organomegaly  Lymphatic:  No cervical or inguinal lymphadenopathy  Neurologic:  Patient is awake alert, oriented   Grossly nonfocal  Extremities no edema    Nuclear stress test    There was a small, mildly severe, partially reversible myocardial perfusion defect of anterior and inferior wall  -  There was of the inferior and anterior wall  Ejection fraction was 61%    Discussion/Summary: This patient has multiple risk factors for coronary artery disease  Patient has symptoms of shortness of breath and abnormal nuclear stress test   Patient has CKD    Patient was evaluated by her nephrologist who recommended her to proceed with cardiac catheterization with that and and risks  Risks and benefits of cardiac catheterization and possible revascularization options including but not limited to risk of infection, bleeding, risk of myocardial infarction, stroke, worsening kidney function requiring dialysis and risk of death discussed at length with patient  Patient understands the risks and benefits and wishes to proceed  Cardiac catheterization will be scheduled   Preprocedure workup will be done  Further cardiac evaluation and management after the results of cardiac catheterization  Patient will have the IV fluids prior to procedure as per the RICARDO protocol initiative  Patient instructed to hold losartan the night before and the day of the procedure  Patient and family had a lot of questions which were answered  Time 25 minutes  50% of time was spent in counseling coordination of care

## 2020-02-07 NOTE — H&P (VIEW-ONLY)
[facility] [place] [id number] [facility] [place] [id number]  Cardiology Follow Up    Airan Carney  1945  [id number]      1  Hypertension, essential     2  Hyperlipemia, mixed     3  Smoking     4  Shortness of breath     5  Stage 3 chronic kidney disease [facility])         Chief Complaint   Patient presents with    Cardiac Risk Assessmnet     Bladder tumor removal w/ Dr Steven Fernandez No dates scheduled    Results     patient had EKG/echo/stress test 11/2019       Interval History:  Patient presents for follow-up visit  Patient was recommended to have cardiac catheterization in November of 2019  Patient wanted to see her nephrologist   She was seen by her nephrologist   Patient wishes to proceed with cardiac catheterization with clearly understanding the risks associated with the procedure including dye induced contrast nephropathy  Patient was seen by primary care physician recently  Unfortunately patient continues to smoke  Patient does have shortness of breath with exertion  Patient has multiple risk factors for coronary artery disease  She states that she has been compliant with all her present medications  Patient Active Problem List   Diagnosis    Spinal stenosis    Basilar artery stenosis    Breast mass    Cerebrovascular accident (CVA) ([address] )    Chronic GERD    Kidney disease, chronic, stage III (moderate, EGFR 30-59 ml/min) ([facility])    Claudication in peripheral vascular disease ([address] )    Depression with anxiety    Type 2 diabetes mellitus with diabetic nephropathy (HCC)    Endometriosis    Gout    Hx-TIA (transient ischemic attack)    Hypercholesteremia    Hyperlipidemia associated with type 2 diabetes mellitus ([address] )    Hypertension    Hypertension associated with diabetes ([address] )    Osteoarthritis    Obesity (BMI 30-39  9)    Polyneuropathy    Right renal artery stenosis (HCC)    Tobacco use disorder    Vertebrobasilar artery syndrome    Vitamin D deficiency    Statin intolerance    Lumbar disc herniation    Pain of left lower extremity    Abnormal EKG    Spondylosis of lumbar region without myelopathy or radiculopathy    Aortoiliac stenosis, left (HCC)    Hypokalemia    Acute drug-induced gout of left foot    Gross hematuria on tissue paper after wiping    Decreased pulses in feet    Refused influenza vaccine    Hypercalcemia    Lumbosacral spondylosis without myelopathy    Neurodermatitis    Other proteinuria    Obesity with body mass index 30 or greater    Hyperlipidemia, unspecified    Herniated lumbar intervertebral disc    Atherosclerosis of renal artery ([facility])    Bladder tumor    Celiac artery stenosis (HCC) >70% stenosis in the celiac trunk    Abnormal CT of the chest suspicious for an infectious or inflammatory bronchiolitis   Positive cardiac stress test  small, mildly severe, partially reversible myocardial perfusion defect of anterior and inferior wall     Femoral artery stenosis, right (HCC) 50-75% stenosis in the common femoral artery      Superior mesenteric artery stenosis (HCC) left    Immunization not carried out because of patient refusal    Median arcuate ligament syndrome [facility]    Abdominal bruit    Initial Medicare annual wellness visit     Past Medical History:   Diagnosis Date    Arthritis     Coronary artery disease     Diabetes mellitus ([facility]     Endometriosis     High cholesterol     Hypertension     Kidney disease, chronic, stage III (moderate, EGFR 30-59 ml/min) ([facility])     Lumbar disc herniation     Peripheral vascular disease ([facility])     Shoulder injury     left    Spinal stenosis     Stroke [facility])      Social History     Socioeconomic History    Marital status: /Civil Union     Spouse name: Not on file    Number of children: Not on file    Years of education: Not on file    Highest education level: Not on file   Occupational History    Not on file   Social Needs    Financial resource strain: Not on file    Food insecurity:     Worry: Not on file     Inability: Not on file    Transportation needs:     Medical: Not on file     Non-medical: Not on file   Tobacco Use    Smoking status: Current Every Day Smoker     Packs/day: 1 00     Types: Cigarettes    Smokeless tobacco: Never Used   Substance and Sexual Activity    Alcohol use: No    Drug use: No    Sexual activity: Not Currently     Partners: Male   Lifestyle    Physical activity:     Days per week: Not on file     Minutes per session: Not on file    Stress: Not on file   Relationships    Social connections:     Talks on phone: Not on file     Gets together: Not on file     Attends [employer] service: Not on file     Active member of club or organization: Not on file     Attends meetings of clubs or organizations: Not on file     Relationship status: Not on file    Intimate partner violence:     Fear of current or ex partner: Not on file     Emotionally abused: Not on file     Physically abused: Not on file     Forced sexual activity: Not on file   Other Topics Concern    Not on file   Social History Narrative    Occasional Caffeine Consumption      Family History   Problem Relation Age of Onset    Hypertension Mother     Heart disease Mother         Valvular    Hyperlipidemia Mother     Hypertension Father     Heart defect Father         Cardiomegaly    Stroke Sister         Cerebrovascular Accident    Arthritis Brother     Other Brother         Back Disorder     Past Surgical History:   Procedure Laterality Date    FRACTURE SURGERY      OVARIAN CYST SURGERY      ROTATOR CUFF REPAIR Left     TONSILLECTOMY         Current Outpatient Medications:     amLODIPine (NORVASC) 5 mg tablet, Take 1 tablet (5 mg total) by mouth 2 (two) times a day, Disp: 60 tablet, Rfl: 11    clopidogrel (PLAVIX) 75 mg tablet, Take 1 tablet (75 mg total) by mouth daily, Disp: 90 tablet, Rfl: 1    Icosapent Ethyl (VASCEPA) 1 g CAPS, 2 cap bid with meals, Disp: 360 capsule, Rfl: 2    labetalol (NORMODYNE) 200 mg tablet, Take 1 tablet (200 mg total) by mouth 2 (two) times a day, Disp: 180 tablet, Rfl: 2    losartan (COZAAR) 100 MG tablet, Take 100 mg by mouth every morning , Disp: , Rfl:     losartan (COZAAR) 50 mg tablet, Take 1 tablet by mouth daily  , Disp: , Rfl:     nicotine (NICOTROL) 10 MG inhaler, Inhale 1 puff as needed for smoking cessation, Disp: 42 each, Rfl: 5    traMADol (ULTRAM) 50 mg tablet, Take 1 tablet (50 mg total) by mouth every 8 (eight) hours as needed for moderate pain, Disp: 60 tablet, Rfl: 1  Allergies   Allergen Reactions    Atorvastatin Dizziness and Myalgia     Other reaction(s): Dizziness/Myalgia    Colesevelam      Other reaction(s): leg pains    Colestipol      Other reaction(s): Swelling, Itching    Ezetimibe      Other reaction(s): heartburn  Other reaction(s): Heart Burn    Fenofibrate      Other reaction(s): blood in urine  hx  Kidney Failure    Pollen Extract     Rosuvastatin      Other reaction(s): Leg Cramping    Statins Myalgia       Labs:  Orders Only on 02/04/2020   Component Date Value    HEP C AB 02/04/2020 <0 1     Interpretation: 02/04/2020 Comment    Orders Only on 01/15/2020   Component Date Value    Class Description 01/15/2020 Comment     D  PTERONYSSINUS IGE 01/15/2020 <0 10     D  FARINAE 01/15/2020 <0 10     Cat Dander 01/15/2020 <0 10     DOG DANDER IGE 01/15/2020 <0 10     BERMUDA GRASS IGE 01/15/2020 <0 10     Blue Grass 01/15/2020 <0 10     Nima Grass 01/15/2020 <0 10     [address] [place] 01/15/2020 <0 10     R583-TpS [place]* 01/15/2020 <0 10     PENICILLIUM CHRYSOGENUM 01/15/2020 <0 10     Cladosporium Herbarum 01/15/2020 <0 10     Aspergillus fumigatus 01/15/2020 <0 10     Mucor Racemosus 01/15/2020 <0 10     Alternaria Alternata 01/15/2020 <0 10     S  HERBARUM 01/15/2020 <0 10    Berle Nicely 01/15/2020 <0 10     T007 [place], White 01/15/2020 <0 10     [place] 01/15/2020 <0 10  MAPLE/BOX ELDER IGE 01/15/2020 <0 10     [place] 01/15/2020 <0 10     T070 White [place] 01/15/2020 <0 10     M[place]AIN CEDAR 01/15/2020 <0 10     Short Ragwd(A jocy ) * 01/15/2020 <0 10     Plantain 01/15/2020 <0 10     COMMON PIGWEED 01/15/2020 <0 10     Nettle 01/15/2020 <0 10     Aspergillus fumigatus 3 * 01/15/2020 Negative     Aspergillus fumigatus 2 * 01/15/2020 Negative     Saccharomonospora Viridi* 01/15/2020 Negative     Thermoactinomyces Candid* 01/15/2020 Negative     A  Fumigatus #1 Abs 01/15/2020 Negative     Aspergillus fumigatus 6 * 01/15/2020 Negative     A  Pullulans Abs 01/15/2020 Negative     M  FAENI ABS 01/15/2020 Negative     [place] Serum Abs 01/15/2020 Negative     T VULGARIS#1 01/15/2020 Negative    Orders Only on 01/15/2020   Component Date Value    White Blood Cell Count 01/15/2020 8 1     Red Blood Cell Count 01/15/2020 5 06     Hemoglobin 01/15/2020 15 2     HCT 01/15/2020 43 5     MCV 01/15/2020 86     MCH 01/15/2020 30 0     MCHC 01/15/2020 34 9     RDW 01/15/2020 14 1     Platelet Count 26/26/0944 261     Neutrophils 01/15/2020 62     Lymphocytes 01/15/2020 24     Monocytes 01/15/2020 9     Eosinophils 01/15/2020 3     Basophils PCT 01/15/2020 1     Neutrophils (Absolute) 01/15/2020 5 1     Lymphocytes (Absolute) 01/15/2020 1 9     Monocytes (Absolute) 01/15/2020 0 7     Eosinophils (Absolute) 01/15/2020 0 3     Basophils ABS 01/15/2020 0 0     Immature Granulocytes 01/15/2020 1     Immature Granulocytes (A* 01/15/2020 0 0     Glucose, Random 01/15/2020 117*    BUN 01/15/2020 23     Creatinine 01/15/2020 1 55*    eGFR Non  01/15/2020 33*    eGFR  01/15/2020 38*    SL AMB BUN/CREATININE RA* 01/15/2020 15     Sodium 01/15/2020 139     Potassium 01/15/2020 4 3     Chloride 01/15/2020 100     CO2 01/15/2020 21     CALCIUM 01/15/2020 9 7     Protein, Total 01/15/2020 7 0     Albumin 01/15/2020 4 5     Globulin, Total 01/15/2020 2 5     Albumin/Globulin Ratio 01/15/2020 1 8     TOTAL BILIRUBIN 01/15/2020 0 3     Alk Phos Isoenzymes 01/15/2020 103     AST 01/15/2020 16     ALT 01/15/2020 16     Specific Gravity 01/15/2020 1 011     Ph 01/15/2020 6 5     Color UA 01/15/2020 Red*    Urine Appearance 01/15/2020 Clear     Leukocyte Esterase 01/15/2020 Trace*    Protein 01/15/2020 2+*    Glucose, 24 HR Urine 01/15/2020 Negative     Ketone, Urine 01/15/2020 Negative     Blood, Urine 01/15/2020 3+*    Bilirubin, Urine 01/15/2020 Negative     Urobilinogen Urine 01/15/2020 0 2     SL AMB NITRITES URINE, Q* 01/15/2020 Negative     Microscopic Examination 01/15/2020 See below:     Urinalysis Reflex 01/15/2020 Comment     SL AMB WBC, URINE 01/15/2020 0-5     RBC, Urine 01/15/2020 >30*    Epithelial Cells (non re* 01/15/2020 0-10     Bacteria, Urine 01/15/2020 Few     Urine Culture Result 01/15/2020 Final report     Result 1 01/15/2020 No growth     Creatinine, Urine 01/15/2020 55 4     Microalbum  ,U,Random 01/15/2020 522 8     Microalb/Creat Ratio 01/15/2020 943 7*    Hemoglobin A1C 01/15/2020 5 9*    25-HYDROXY VIT D 01/15/2020 30 9     TSH 01/15/2020 2 880     Uric Acid 01/15/2020 7 2*    Magnesium, Serum 01/15/2020 1 9      Imaging: Xr Sinuses Routine 3+ Views    Result Date: 2/1/2020  Narrative: PARANASAL SINUSES INDICATION:  R09 81: Nasal congestion  COMPARISON:  None VIEWS:  XR SINUSES ROUTINE 3+ VIEWS FINDINGS: No evidence of sinus opacification or air-fluid levels  No lytic or blastic lesions are identified  Impression: No evidence of sinusitis  Workstation performed: [id number]     Ct Chest Without Contrast    Result Date: 1/29/2020  Narrative: CT CHEST WITHOUT IV CONTRAST INDICATION:   R93 89: Abnormal findings on diagnostic imaging of other specified body structures  Extensive smoking history abnormal chest CT for lung cancer screening  Dry nonproductive cough    Intermittent sinus congestion  COMPARISON:  Chest CT from 10/29/2019  Abdomen CT from 2/10/2011 which includes the lower lungs  TECHNIQUE: CT examination of the chest was performed without intravenous contrast   Axial, sagittal, and coronal 2D reformatted images were created from the source data and submitted for interpretation  Radiation dose length product (DLP) for this visit:  195 mGy-cm   This examination, like all CT scans performed in the [facility], was performed utilizing techniques to minimize radiation dose exposure, including the use of iterative reconstruction and automated exposure control  FINDINGS: LUNGS:  Multiple 5 mm and smaller nodules within upper lung predominance, unchanged since 10/29/2019  The abdominal CT from February 2011 shows several nodules in the lower lungs  Indeterminate 7 mm groundglass opacity in the right apex, unchanged since October 2019 (3/22)  New 5 mm juxtapleural nodule in the left lower lobe since October 2019, likely a benign intrapulmonary lymph node (3/49)  6 mm juxtapleural paraspinal left lower lobe nodule, unchanged since February 2011 (3/71)  PLEURA:  Unremarkable  HEART/GREAT VESSELS:  Moderate coronary artery calcification indicating atherosclerotic heart disease  MEDIASTINUM AND JERMAIN:  Unremarkable  CHEST WALL AND LOWER NECK:   Unremarkable  VISUALIZED STRUCTURES IN THE UPPER ABDOMEN:  Unremarkable  OSSEOUS STRUCTURES:  Moderate degenerative disease in the spine  Impression: Multiple 5 mm and smaller nodules within upper lung predominance, unchanged since October 2019  Multiple nodules are visible in the mid and lower lungs on the abdominal CT from February 2011  This is likely a chronic inflammatory process  7 mm groundglass opacity in the right upper lobe, unchanged since October 2019  This could represent a slowly growing lung cancer    The guideline for groundglass nodules of this size is to continue annual low dose chest CT for lung cancer screening  The study was marked in EPIC for significant notification  Workstation performed: [id number]       Review of Systems:  Review of Systems   REVIEW OF SYSTEMS:  Constitutional:  Denies fever or chills   Eyes:  Denies change in visual acuity   HENT:  Denies nasal congestion or sore throat   Respiratory:   shortness of breath   Cardiovascular:  Denies chest pain or edema   GI:  Denies abdominal pain, nausea, vomiting, bloody stools or diarrhea   :  Denies dysuria, frequency, difficulty in micturition and nocturia  Musculoskeletal:  Denies back pain or joint pain   Neurologic:  Denies headache, focal weakness or sensory changes   Endocrine:  Denies polyuria or polydipsia   Lymphatic:  Denies swollen glands   Psychiatric:  Denies depression or anxiety     Physical Exam:    /88   Pulse 68   Ht 4' 10" (1 473 m)   Wt 74 8 kg (165 lb)   LMP  (LMP Unknown)   SpO2 98%   BMI 34 49 kg/m²     Physical Exam   PHYSICAL EXAM:  General:  Patient is not in acute distress   Head: Normocephalic, Atraumatic  HEENT:  Both pupils normal-size atraumatic, normocephalic, nonicteric  Neck:  JVP not raised  Trachea central  No carotid bruit  Respiratory:  Decreased breath sounds bilaterally  Cardiovascular:  Regular rate and rhythm no S3 no murmurs  GI:  Abdomen soft nontender  No organomegaly  Lymphatic:  No cervical or inguinal lymphadenopathy  Neurologic:  Patient is awake alert, oriented   Grossly nonfocal  Extremities no edema    Nuclear stress test    There was a small, mildly severe, partially reversible myocardial perfusion defect of anterior and inferior wall  -  There was of the inferior and anterior wall  Ejection fraction was 61%    Discussion/Summary: This patient has multiple risk factors for coronary artery disease  Patient has symptoms of shortness of breath and abnormal nuclear stress test   Patient has CKD    Patient was evaluated by her nephrologist who recommended her to proceed with cardiac catheterization with that and and risks  Risks and benefits of cardiac catheterization and possible revascularization options including but not limited to risk of infection, bleeding, risk of myocardial infarction, stroke, worsening kidney function requiring dialysis and risk of death discussed at length with patient  Patient understands the risks and benefits and wishes to proceed  Cardiac catheterization will be scheduled   Preprocedure workup will be done  Further cardiac evaluation and management after the results of cardiac catheterization  Patient will have the IV fluids prior to procedure as per the RICARDO protocol initiative  Patient instructed to hold losartan the night before and the day of the procedure  Patient and family had a lot of questions which were answered  Time 25 minutes  50% of time was spent in counseling coordination of care

## 2020-02-11 ENCOUNTER — TELEPHONE (OUTPATIENT)
Dept: INTERNAL MEDICINE CLINIC | Facility: CLINIC | Age: 75
End: 2020-02-11

## 2020-02-11 LAB
ALBUMIN SERPL-MCNC: 4.2 G/DL (ref 3.7–4.7)
ALBUMIN/GLOB SERPL: 1.8 {RATIO} (ref 1.2–2.2)
ALP SERPL-CCNC: 94 IU/L (ref 39–117)
ALT SERPL-CCNC: 12 IU/L (ref 0–32)
AST SERPL-CCNC: 13 IU/L (ref 0–40)
BASOPHILS # BLD AUTO: 0 X10E3/UL (ref 0–0.2)
BASOPHILS NFR BLD AUTO: 1 %
BILIRUB SERPL-MCNC: 0.3 MG/DL (ref 0–1.2)
BUN SERPL-MCNC: 26 MG/DL (ref 8–27)
BUN/CREAT SERPL: 17 (ref 12–28)
CALCIUM SERPL-MCNC: 9.7 MG/DL (ref 8.7–10.3)
CHLORIDE SERPL-SCNC: 101 MMOL/L (ref 96–106)
CO2 SERPL-SCNC: 21 MMOL/L (ref 20–29)
CREAT SERPL-MCNC: 1.5 MG/DL (ref 0.57–1)
EOSINOPHIL # BLD AUTO: 0.3 X10E3/UL (ref 0–0.4)
EOSINOPHIL NFR BLD AUTO: 4 %
ERYTHROCYTE [DISTWIDTH] IN BLOOD BY AUTOMATED COUNT: 13.8 % (ref 11.7–15.4)
GLOBULIN SER-MCNC: 2.4 G/DL (ref 1.5–4.5)
GLUCOSE SERPL-MCNC: 140 MG/DL (ref 65–99)
HCT VFR BLD AUTO: 42.6 % (ref 34–46.6)
HGB BLD-MCNC: 14.7 G/DL (ref 11.1–15.9)
IMM GRANULOCYTES # BLD: 0.1 X10E3/UL (ref 0–0.1)
IMM GRANULOCYTES NFR BLD: 1 %
INR PPP: 1 (ref 0.8–1.2)
LYMPHOCYTES # BLD AUTO: 1.9 X10E3/UL (ref 0.7–3.1)
LYMPHOCYTES NFR BLD AUTO: 23 %
MCH RBC QN AUTO: 29.8 PG (ref 26.6–33)
MCHC RBC AUTO-ENTMCNC: 34.5 G/DL (ref 31.5–35.7)
MCV RBC AUTO: 86 FL (ref 79–97)
MONOCYTES # BLD AUTO: 0.6 X10E3/UL (ref 0.1–0.9)
MONOCYTES NFR BLD AUTO: 7 %
NEUTROPHILS # BLD AUTO: 5.5 X10E3/UL (ref 1.4–7)
NEUTROPHILS NFR BLD AUTO: 64 %
PLATELET # BLD AUTO: 214 X10E3/UL (ref 150–450)
POTASSIUM SERPL-SCNC: 4.3 MMOL/L (ref 3.5–5.2)
PROT SERPL-MCNC: 6.6 G/DL (ref 6–8.5)
PROTHROMBIN TIME: 10.4 SEC (ref 9.1–12)
RBC # BLD AUTO: 4.94 X10E6/UL (ref 3.77–5.28)
SL AMB EGFR AFRICAN AMERICAN: 40 ML/MIN/1.73
SL AMB EGFR NON AFRICAN AMERICAN: 35 ML/MIN/1.73
SODIUM SERPL-SCNC: 139 MMOL/L (ref 134–144)
WBC # BLD AUTO: 8.5 X10E3/UL (ref 3.4–10.8)

## 2020-02-11 NOTE — TELEPHONE ENCOUNTER
----- Message from Noelle Mendoza, [address] sent at 2/10/2020 12:45 PM EST -----  Please call  I know she has a hx of bladder tumor  Is she being followed closely by urology? Her urine showed large blood   thanks

## 2020-02-13 NOTE — TELEPHONE ENCOUNTER
Pt is scheduled for a [facility] on 2/26/2020  Can we check for auth please?  Thank you

## 2020-02-21 ENCOUNTER — OFFICE VISIT (OUTPATIENT)
Dept: NEUROLOGY | Facility: CLINIC | Age: 75
End: 2020-02-21
Payer: COMMERCIAL

## 2020-02-21 VITALS
HEIGHT: 58 IN | WEIGHT: 164 LBS | DIASTOLIC BLOOD PRESSURE: 82 MMHG | SYSTOLIC BLOOD PRESSURE: 160 MMHG | HEART RATE: 78 BPM | BODY MASS INDEX: 34.43 KG/M2

## 2020-02-21 DIAGNOSIS — I63.9 CEREBROVASCULAR ACCIDENT (CVA), UNSPECIFIED MECHANISM (HCC): Primary | ICD-10-CM

## 2020-02-21 DIAGNOSIS — M47.816 SPONDYLOSIS OF LUMBAR REGION WITHOUT MYELOPATHY OR RADICULOPATHY: ICD-10-CM

## 2020-02-21 PROCEDURE — 3066F NEPHROPATHY DOC TX: CPT | Performed by: PSYCHIATRY & NEUROLOGY

## 2020-02-21 PROCEDURE — 3008F BODY MASS INDEX DOCD: CPT | Performed by: PSYCHIATRY & NEUROLOGY

## 2020-02-21 PROCEDURE — 1160F RVW MEDS BY RX/DR IN RCRD: CPT | Performed by: PSYCHIATRY & NEUROLOGY

## 2020-02-21 PROCEDURE — 3077F SYST BP >= 140 MM HG: CPT | Performed by: PSYCHIATRY & NEUROLOGY

## 2020-02-21 PROCEDURE — 99213 OFFICE O/P EST LOW 20 MIN: CPT | Performed by: PSYCHIATRY & NEUROLOGY

## 2020-02-21 PROCEDURE — 3079F DIAST BP 80-89 MM HG: CPT | Performed by: PSYCHIATRY & NEUROLOGY

## 2020-02-21 PROCEDURE — 1170F FXNL STATUS ASSESSED: CPT | Performed by: PSYCHIATRY & NEUROLOGY

## 2020-02-21 PROCEDURE — 4040F PNEUMOC VAC/ADMIN/RCVD: CPT | Performed by: PSYCHIATRY & NEUROLOGY

## 2020-02-21 RX ORDER — NICOTINE 10 MG
CARTRIDGE (EA) INHALATION
COMMUNITY
Start: 2020-02-05 | End: 2020-02-25 | Stop reason: SDUPTHER

## 2020-02-21 NOTE — PROGRESS NOTES
Progress Note - Neurology   Prince Willams 69 y o  female MRN: [id number]  Unit/Bed#:  Encounter: [id number]      Subjective:   Patient is here for a follow-up visit and since her last visit has been detected to have a bladder tumor suspected lung nodule, severe PAD, is to undergo cardiac clearance in the near future and subsequently bladder surgery  She also had a lacunar CVA in the past and suffers chronic low back pain  Her neurological symptoms have been relatively under good control, patient has stopped taking tramadol over a month ago and claims she has quit smoking  She denies any central neurological symptoms or any motor or sensory symptoms in the upper or lower extremities does experience claudicatory symptoms in both lower extremities  She is to see vascular surgery in the near future  Patient remains on Plavix 75 mg daily  ROS:   Review of Systems   Constitutional: Negative  Negative for appetite change and fever  HENT: Negative  Negative for hearing loss, tinnitus, trouble swallowing and voice change  Eyes: Negative  Negative for photophobia and pain  Respiratory: Negative  Negative for shortness of breath  Cardiovascular: Negative  Negative for palpitations  Gastrointestinal: Negative  Negative for nausea and vomiting  Endocrine: Negative  Negative for cold intolerance and heat intolerance  Genitourinary: Negative  Negative for dysuria, frequency and urgency  Musculoskeletal: Positive for arthralgias and gait problem  Negative for myalgias and neck pain  Spinal Stenosis  Pain in Legs  Falls   Skin: Negative  Negative for rash  Neurological: Negative for dizziness, tremors, seizures, syncope, facial asymmetry, speech difficulty, weakness, light-headedness, numbness and headaches  Hematological: Negative  Does not bruise/bleed easily  Psychiatric/Behavioral: Negative  Negative for confusion, hallucinations and sleep disturbance         Vitals:   Vitals: 02/21/20 1036   BP: 160/82   BP Location: Left arm   Patient Position: Sitting   Cuff Size: Adult   Pulse: 78   Weight: 74 4 kg (164 lb)   Height: 4' 10" (1 473 m)   ,Body mass index is 34 28 kg/m²  MEDS:      Current Outpatient Medications:     amLODIPine (NORVASC) 5 mg tablet, Take 1 tablet (5 mg total) by mouth 2 (two) times a day, Disp: 60 tablet, Rfl: 11    clopidogrel (PLAVIX) 75 mg tablet, Take 1 tablet (75 mg total) by mouth daily, Disp: 90 tablet, Rfl: 1    Icosapent Ethyl (VASCEPA) 1 g CAPS, 2 cap bid with meals, Disp: 360 capsule, Rfl: 2    labetalol (NORMODYNE) 200 mg tablet, Take 1 tablet (200 mg total) by mouth 2 (two) times a day, Disp: 180 tablet, Rfl: 2    losartan (COZAAR) 100 MG tablet, Take 100 mg by mouth every morning , Disp: , Rfl:     losartan (COZAAR) 50 mg tablet, Take 1 tablet by mouth daily  , Disp: , Rfl:     nicotine (NICOTROL) 10 MG inhaler, Inhale 1 puff as needed for smoking cessation, Disp: 42 each, Rfl: 5    traMADol (ULTRAM) 50 mg tablet, Take 1 tablet (50 mg total) by mouth every 8 (eight) hours as needed for moderate pain, Disp: 60 tablet, Rfl: 1    NICOTROL 10 MG inhaler, 1-2 PUFFS DAILY, Disp: , Rfl:   :    Physical Exam:  General appearance: alert, appears stated age and cooperative  Head: Normocephalic, without obvious abnormality, atraumatic    On examination patient is alert awake oriented, speech is fluent, cranial nerves 2-12 intact, and on motor and sensory exam there is no evidence of any focal weakness or sensory loss in the upper lower extremities, deep tendon reflexes are diminished at the left knee, no evidence of any dysmetria, patient ambulates with an antalgic pattern with the help of a cane and has mild lumbosacral tenderness  Lab Results: I have personally reviewed pertinent reports  Imaging Studies: I have personally reviewed pertinent reports  Assessment:  1  CVA with cerebrovascular disease    2  Chronic low back pain secondary to lumbar DJD  Plan:  Patient is advised to discontinue tramadol at this time, continue home exercise program continue Plavix 75 mg daily and will return back to see me in 6 months  She has multiple other comorbidities she is dealing with currently, and remains stable from the neurological standpoint  2/21/2020,10:43 AM    Dictation voice to text software has been used in the creation of this document  Please consider this in light of any contextual or grammatical errors

## 2020-02-24 ENCOUNTER — TELEPHONE (OUTPATIENT)
Dept: INTERVENTIONAL RADIOLOGY/VASCULAR | Facility: HOSPITAL | Age: 75
End: 2020-02-24

## 2020-02-24 ENCOUNTER — TELEPHONE (OUTPATIENT)
Dept: ADMINISTRATIVE | Facility: HOSPITAL | Age: 75
End: 2020-02-24

## 2020-02-24 RX ORDER — SODIUM CHLORIDE 9 MG/ML
223 INJECTION, SOLUTION INTRAVENOUS CONTINUOUS
Status: CANCELLED | OUTPATIENT
Start: 2020-02-24

## 2020-02-25 ENCOUNTER — OFFICE VISIT (OUTPATIENT)
Dept: PULMONOLOGY | Facility: CLINIC | Age: 75
End: 2020-02-25
Payer: COMMERCIAL

## 2020-02-25 ENCOUNTER — CONSULT (OUTPATIENT)
Dept: NEPHROLOGY | Facility: CLINIC | Age: 75
End: 2020-02-25
Payer: COMMERCIAL

## 2020-02-25 VITALS
HEART RATE: 69 BPM | DIASTOLIC BLOOD PRESSURE: 98 MMHG | HEIGHT: 59 IN | SYSTOLIC BLOOD PRESSURE: 174 MMHG | RESPIRATION RATE: 16 BRPM | BODY MASS INDEX: 33.22 KG/M2 | WEIGHT: 164.8 LBS | TEMPERATURE: 97.5 F

## 2020-02-25 VITALS
WEIGHT: 164 LBS | BODY MASS INDEX: 34.43 KG/M2 | HEART RATE: 66 BPM | HEIGHT: 58 IN | OXYGEN SATURATION: 98 % | DIASTOLIC BLOOD PRESSURE: 100 MMHG | SYSTOLIC BLOOD PRESSURE: 160 MMHG

## 2020-02-25 DIAGNOSIS — N18.30 HYPERTENSIVE KIDNEY DISEASE WITH STAGE 3 CHRONIC KIDNEY DISEASE (HCC): ICD-10-CM

## 2020-02-25 DIAGNOSIS — I12.9 HYPERTENSIVE KIDNEY DISEASE WITH STAGE 3 CHRONIC KIDNEY DISEASE (HCC): ICD-10-CM

## 2020-02-25 DIAGNOSIS — R91.8 LUNG NODULES: Primary | ICD-10-CM

## 2020-02-25 DIAGNOSIS — R80.8 OTHER PROTEINURIA: ICD-10-CM

## 2020-02-25 DIAGNOSIS — R93.89 ABNORMAL CT OF THE CHEST: ICD-10-CM

## 2020-02-25 DIAGNOSIS — R06.02 SOB (SHORTNESS OF BREATH): ICD-10-CM

## 2020-02-25 DIAGNOSIS — F17.200 TOBACCO USE DISORDER: ICD-10-CM

## 2020-02-25 DIAGNOSIS — N18.30 STAGE 3 CHRONIC KIDNEY DISEASE (HCC): Primary | ICD-10-CM

## 2020-02-25 PROCEDURE — 99204 OFFICE O/P NEW MOD 45 MIN: CPT | Performed by: INTERNAL MEDICINE

## 2020-02-25 PROCEDURE — 4040F PNEUMOC VAC/ADMIN/RCVD: CPT | Performed by: INTERNAL MEDICINE

## 2020-02-25 PROCEDURE — 3077F SYST BP >= 140 MM HG: CPT | Performed by: INTERNAL MEDICINE

## 2020-02-25 PROCEDURE — 1036F TOBACCO NON-USER: CPT | Performed by: INTERNAL MEDICINE

## 2020-02-25 PROCEDURE — 1160F RVW MEDS BY RX/DR IN RCRD: CPT | Performed by: INTERNAL MEDICINE

## 2020-02-25 PROCEDURE — 3066F NEPHROPATHY DOC TX: CPT | Performed by: INTERNAL MEDICINE

## 2020-02-25 PROCEDURE — 1170F FXNL STATUS ASSESSED: CPT | Performed by: INTERNAL MEDICINE

## 2020-02-25 PROCEDURE — 99214 OFFICE O/P EST MOD 30 MIN: CPT | Performed by: INTERNAL MEDICINE

## 2020-02-25 PROCEDURE — 3008F BODY MASS INDEX DOCD: CPT | Performed by: INTERNAL MEDICINE

## 2020-02-25 PROCEDURE — 3078F DIAST BP <80 MM HG: CPT | Performed by: INTERNAL MEDICINE

## 2020-02-25 NOTE — LETTER
February 25, 2020     Deandre Kenney DO  [id number] [facility] [address]    Patient: Kian Angel   YOB: 1945   Date of Visit: 2/25/2020       Dear Dr Gold Brian: Thank you for referring Dozier Boeck to me for evaluation  Below are my notes for this consultation  If you have questions, please do not hesitate to call me  I look forward to following your patient along with you  Sincerely,        Calixto Mccormack MD        CC: MD Calixto Alvarez MD  2/25/2020  4:01 PM  Sign at close encounter  [address] [place] [address] 69 y o  @SEX @ YOB: 1945 MRN: [id number]    Encounter: [id number] DATE: 2/25/2020    REASON FOR VISIT: Kian Angel is a 69 y  o female who was referred by Aron Bustillos for further management of chronic kidney disease /RICARDO risk reduction strategies  HPI:    This is 14-year-old female with past medical history significant for hypertension, hyperlipidemia, spinal stenosis, peripheral vascular disease, referred to Nephrology for further management of CKD stage 3/RICARDO risk reduction strategies in the light of undergoing cardiac catheterization soon  Patient's  was also present at the time of consultation and history was also obtained from him and all the questions were answered  Upon review of old medical records including records from [facility], patient is currently been followed by nephrologist at [address] [place] [address] and patient was seen by him almost 3 weeks back as a part of RICARDO risk reduction strategies  Upon review of old medical records, baseline creatinine seems to be fluctuating around 1 5-1 6 with last known creatinine of 1 5 with EGFR of 35  Patient earlier developed hematuria and as a part of workup she was also diagnosed with possible mass in urinary bladder suspicious for malignancy and needed cardiac cardiac clearance    As a part of cardiac evaluation, patient would need cardiac catheterization which is currently been schedule for tomorrow-2/26/2020  Patient has underlying hypertension and had underwent renal artery Doppler on 1/7/2020 and also found to have > 60% narrowing of right main renal artery  Left side was difficult to evaluate  Patient had underwent renal ultrasound on 10/29/2019 showed nodular density in the floor of urinary bladder  Left kidney was also atrophic  Patient said that her blood pressure is currently not under well control  Patient take amlodipine along with labetalol and losartan-100 mg in the morning and 50 at night  Patient has also quit smoking almost 2 weeks back  Patient is also currently been followed by neurologist for CVA-lacunar infarct  Patient takes all the medications as prescribed and denies use of NSAIDs  REVIEW OF SYSTEMS:    Review of Systems   Constitutional: Negative for chills and fever  HENT: Negative for nosebleeds and sore throat  Eyes: Negative for photophobia and pain  Respiratory: Negative for choking and wheezing  Cardiovascular: Negative for chest pain and palpitations  Gastrointestinal: Negative for abdominal pain and blood in stool  Endocrine: Negative for cold intolerance and heat intolerance  Genitourinary: Negative for flank pain and hematuria  Musculoskeletal: Negative for joint swelling and neck pain  Skin: Negative for color change and pallor  Allergic/Immunologic: Negative for environmental allergies and immunocompromised state  Neurological: Negative for seizures and syncope  Hematological: Negative for adenopathy  Does not bruise/bleed easily  Psychiatric/Behavioral: Negative for confusion and suicidal ideas           PAST MEDICAL HISTORY:  Past Medical History:   Diagnosis Date    Arthritis     Coronary artery disease     Diabetes mellitus ([facility] [address] )     Endometriosis     High cholesterol     Hypertension     Kidney disease, chronic, stage III (moderate, EGFR 30-59 ml/min) (HCC)     Lumbar disc herniation     Peripheral vascular disease (HCC)     Shoulder injury     left    Spinal stenosis     Stroke ([facility] [address] )        PAST SURGICAL HISTORY:  Past Surgical History:   Procedure Laterality Date    FRACTURE SURGERY      OVARIAN CYST SURGERY      ROTATOR CUFF REPAIR Left     TONSILLECTOMY         SOCIAL HISTORY:  Social History     Substance and Sexual Activity   Alcohol Use No     Social History     Substance and Sexual Activity   Drug Use No     Social History     Tobacco Use   Smoking Status Former Smoker    Packs/day: 1 00    Types: Cigarettes   Smokeless Tobacco Never Used       FAMILY HISTORY:  Family History   Problem Relation Age of Onset    Hypertension Mother     Heart disease Mother         Valvular    Hyperlipidemia Mother     Hypertension Father     Heart defect Father         Cardiomegaly    Stroke Sister         Cerebrovascular Accident    Arthritis Brother     Other Brother         Back Disorder       ALLERGY:  Allergies   Allergen Reactions    Atorvastatin Dizziness and Myalgia     Other reaction(s): Dizziness/Myalgia    Colesevelam      Other reaction(s): leg pains    Colestipol      Other reaction(s): Swelling, Itching    Ezetimibe      Other reaction(s): heartburn  Other reaction(s): Heart Burn    Fenofibrate      Other reaction(s): blood in urine  hx  Kidney Failure    Pollen Extract     Rosuvastatin      Other reaction(s): Leg Cramping    Statins Myalgia       MEDICATIONS:    Current Outpatient Medications:     amLODIPine (NORVASC) 5 mg tablet, Take 1 tablet (5 mg total) by mouth 2 (two) times a day, Disp: 60 tablet, Rfl: 11    clopidogrel (PLAVIX) 75 mg tablet, Take 1 tablet (75 mg total) by mouth daily, Disp: 90 tablet, Rfl: 1    Icosapent Ethyl (VASCEPA) 1 g CAPS, 2 cap bid with meals, Disp: 360 capsule, Rfl: 2    labetalol (NORMODYNE) 200 mg tablet, Take 1 tablet (200 mg total) by mouth 2 (two) times a day, Disp: 180 tablet, Rfl: 2    losartan (COZAAR) 100 MG tablet, Take 100 mg by mouth every morning , Disp: , Rfl:     losartan (COZAAR) 50 mg tablet, Take 1 tablet by mouth daily  , Disp: , Rfl:     nicotine (NICOTROL) 10 MG inhaler, Inhale 1 puff as needed for smoking cessation, Disp: 42 each, Rfl: 5    PHYSICAL EXAM:  Vitals:    02/25/20 1503   BP: (!) 174/98   BP Location: Right arm   Patient Position: Sitting   Cuff Size: Standard   Pulse: 69   Resp: 16   Temp: 97 5 °F (36 4 °C)   TempSrc: Tympanic   Weight: 74 8 kg (164 lb 12 8 oz)   Height: 4' 11" (1 499 m)     Body mass index is 33 29 kg/m²  Physical Exam   Constitutional: She appears well-nourished  No distress  HENT:   Head: Normocephalic and atraumatic  Eyes: No scleral icterus  Neck: Neck supple  No JVD present  Cardiovascular: Normal rate, S1 normal and S2 normal    Pulmonary/Chest: Effort normal  No accessory muscle usage  No respiratory distress  She has no wheezes  Abdominal: Soft  She exhibits no distension  Musculoskeletal: She exhibits no edema  Right ankle: She exhibits no swelling  Left ankle: She exhibits no swelling  Right hand: She exhibits no laceration  Left hand: She exhibits no laceration  Lymphadenopathy:        Right cervical: No superficial cervical adenopathy present  Left cervical: No superficial cervical adenopathy present  Neurological: She is alert  She is not disoriented  Skin: Skin is warm  No cyanosis  Psychiatric: She is not combative  She does not exhibit a depressed mood  She expresses no suicidal ideation         LAB RESULTS:  Results for orders placed or performed in visit on 02/10/20   CBC and differential   Result Value Ref Range    White Blood Cell Count 8 5 3 4 - 10 8 x10E3/uL    Red Blood Cell Count 4 94 3 77 - 5 28 x10E6/uL    Hemoglobin 14 7 11 1 - 15 9 g/dL    HCT 42 6 34 0 - 46 6 %    MCV 86 79 - 97 fL    MCH 29 8 26 6 - 33 0 pg    MCHC 34 5 31 5 - 35 7 g/dL RDW 13 8 11 7 - 15 4 %    Platelet Count 891 847 - 450 x10E3/uL    Neutrophils 64 Not Estab  %    Lymphocytes 23 Not Estab  %    Monocytes 7 Not Estab  %    Eosinophils 4 Not Estab  %    Basophils PCT 1 Not Estab  %    Neutrophils (Absolute) 5 5 1 4 - 7 0 x10E3/uL    Lymphocytes (Absolute) 1 9 0 7 - 3 1 x10E3/uL    Monocytes (Absolute) 0 6 0 1 - 0 9 x10E3/uL    Eosinophils (Absolute) 0 3 0 0 - 0 4 x10E3/uL    Basophils ABS 0 0 0 0 - 0 2 x10E3/uL    Immature Granulocytes 1 Not Estab  %    Immature Granulocytes (Absolute) 0 1 0 0 - 0 1 x10E3/uL   Comprehensive metabolic panel   Result Value Ref Range    Glucose, Random 140 (H) 65 - 99 mg/dL    BUN 26 8 - 27 mg/dL    Creatinine 1 50 (H) 0 57 - 1 00 mg/dL    eGFR Non African American 35 (L) >59 mL/min/1 73    eGFR  40 (L) >59 mL/min/1 73    SL AMB BUN/CREATININE RATIO 17 12 - 28    Sodium 139 134 - 144 mmol/L    Potassium 4 3 3 5 - 5 2 mmol/L    Chloride 101 96 - 106 mmol/L    CO2 21 20 - 29 mmol/L    CALCIUM 9 7 8 7 - 10 3 mg/dL    Protein, Total 6 6 6 0 - 8 5 g/dL    Albumin 4 2 3 7 - 4 7 g/dL    Globulin, Total 2 4 1 5 - 4 5 g/dL    Albumin/Globulin Ratio 1 8 1 2 - 2 2    TOTAL BILIRUBIN 0 3 0 0 - 1 2 mg/dL    Alk Phos Isoenzymes 94 39 - 117 IU/L    AST 13 0 - 40 IU/L    ALT 12 0 - 32 IU/L   Protime-INR   Result Value Ref Range    INR 1 0 0 8 - 1 2    Prothrombin Time 10 4 9 1 - 12 0 sec     Renal ultrasound done on 10/29/2019 showed nodular density in floor of urinary bladder suspicious for cancer/malignancy  Atrophic left kidney  Renal artery Doppler done on 1/7/2020 showed > 60% narrowing/stenosis of right main renal artery  Left side was difficult to evaluate  ASSESSMENT and PLAN:  Anupama Tyler was seen today for chronic kidney disease and consult      Diagnoses and all orders for this visit:    Stage 3 chronic kidney disease ([address] )  -     Ambulatory referral to Nephrology  -     CBC and differential; Future  -     Basic metabolic panel; Future  -     UA (URINE) with reflex to Scope; Future  -     Microalbumin / creatinine urine ratio; Future  -     Phosphorus; Future  -     Uric acid; Future  -     PTH, intact; Future  -     Vitamin D 25 hydroxy; Future    Hypertensive kidney disease with stage 3 chronic kidney disease (HCC)  -     Basic metabolic panel; Future  -     UA (URINE) with reflex to Scope; Future  -     Microalbumin / creatinine urine ratio; Future    Other proteinuria  -     UA (URINE) with reflex to Scope; Future  -     Microalbumin / creatinine urine ratio; Future      1  CKD stage 3/RICARDO risk reduction strategies/preoperative renal clearance  Patient has underlying chronic kidney disease was attributed due to hypertensive nephrosclerosis on top of atrophic left kidney and advanced age  Upon review of old medical records including records from [facility] everywhere, patient is currently been followed by nephrologist at [address] [place] [address], and was also found to have baseline creatinine between 1 5-1 6 with recent creatinine of 1 5 with EGFR of 35 which seems to be at baseline  Patient was found to have hematuria and as a part of workup was found to have possible bladder malignancy and would require cardiac clearance and scheduled to undergo cardiac catheterization tomorrow-2/26/2020  I have personally discussed the risks and benefits of the cardiac catheterization from a renal standpoint with the patient in depth, and advised the patient about the risk of RICARDO and the course of RICARDO if it occurs with the small probability of the need for renal replacement therapy in the worse case scenario  Patient voiced understanding   From a renal standpoint the patient is renally optimized  for surgery with the following recommendations:   Fluids to administer:  Patient is scheduled to received pre and post cardiac catheterization IV fluid per protocol     Medication Recommendations:   Minimize any IV contrast use (If IV contrast is used, please check BMP POD # 1)   Hold NSAIDs for at least 10 days prior to surgery   Plan to hold losartan today (since patient is scheduled to undergo procedure tomorrow)   Hemodynamic Recommendations:   Ideally, target MAPs greater than 65 mmHg in the perioperative period, and minimize operative time with MAPs < 65 mmHg   Avoid intraop hemodynamic instability to decrease risk for RICARDO occurrence   Other Recommendations:   Please check a BMP POD day # 1 and call if any questions or if Creatinine is rising or electrolyte abnormalities present      I have also discussed consideration of dialysis if GFR goes below 15 and develops uremic symptoms  Patient said that she knows family/friend were on dialysis and she absolutely does not want dialysis even though that would be life-saving procedure  Management of hypertension as mentioned below  Plan to avoid NSAIDs and recheck renal function before next visit  2  Hypertension in chronic kidney disease  Today's blood pressure was elevated and above the goal   According to patient her blood pressure is also staying little bit on the higher side lately  Advised patient to check blood pressure on regular basis and make a log for our review with the next visit  For time being monitor hypertension with amlodipine 5 mg PO BID along with labetalol 200 mg PO daily and losartan (patient is currently taking losartan 100 mg in the morning and 50 at night which is on conventional dosing and higher than recommended and in the future would consider decreasing losartan to 100 mg PO daily which is more recommended dose)  Patient had underwent renal artery Doppler on 1/7/2020 and also found to have > 60% stenosis of right main renal artery  Would recommend medical management prior to considering renal artery stenting  3  Proteinuria  Multifactorial with recent urine microalbumin to creatinine ratio of 943 mg (1/15/2020)    Need better control of hypertension in the future  For time being continue losartan  Recheck proteinuria before next visit  Returns to Nephrology office in 3 months  Future labs ordered  Portions of the record may have been created with voice recognition software  Occasional wrong word or "sound a like" substitutions may have occurred due to the inherent limitations of voice recognition software  Read the chart carefully and recognize, using context, where substitutions have occurred  If you have any questions, please contact the dictating provider

## 2020-02-25 NOTE — PROGRESS NOTES
Assessment/Plan:   Diagnoses and all orders for this visit:    Lung nodules  -     CT chest without contrast; Future    Abnormal CT of the chest    SOB (shortness of breath)    Tobacco use disorder        Recent PFTs with no evidence of any obstructive lung disease FEV1 has been completely normal along with the DLCO also has been normal   Reviewed CT of the chest report and images with the patient  With diffuse tree-in-bud appearance is, likely infectious versus inflammatory etiology currently not having any fevers no evidence of any leukocytosis, likely all inflammation  CT of the chest demonstrating multiple 5 mm and smaller nodules, and indeterminate 7 mm ground-glass opacity in the right apex which has been unchanged from the prior CT in October of 2019  Will repeat CT of the chest in in 6months  and follow-up  Long discussion with the patient with regards to smoking cessation does not want to quit currently  She has tried several times in the past and was not successful she states  Vaccinations up-to-date  Follow-up after the about testing  Return in about 6 months (around 8/25/2020)  All questions are answered to the patient's satisfaction and understanding  She verbalizes understanding  She is encouraged to call with any further questions or concerns  Portions of the record may have been created with voice recognition software  Occasional wrong word or "sound a like" substitutions may have occurred due to the inherent limitations of voice recognition software  Read the chart carefully and recognize, using context, where substitutions have occurred      Electronically Signed by Judge Elly MD    ______________________________________________________________________    Chief Complaint:   Chief Complaint   Patient presents with    Follow-up    Shortness of Breath       Patient ID: Milagros Lane is a 69 y o  y o  female has a past medical history of Arthritis, Coronary artery disease, Diabetes mellitus ([facility] [address] ), Endometriosis, High cholesterol, Hypertension, Kidney disease, chronic, stage III (moderate, EGFR 30-59 ml/min) (HCC), Lumbar disc herniation, Peripheral vascular disease ([address] ), Shoulder injury, Spinal stenosis, and Stroke ([facility] [address] )  2/25/2020  Patient presents today for follow-up visit  Patient is a very pleasant 47-year-old lady, with extensive smoking history with greater than 60 pack year smoking history still smoking a pack a day, does not want to quit, she states she has tried several times in the past to quit and was not successful she states  She had a CT of the chest for lung cancer screening done in October of 2019 which demonstrated diffuse tree-in-bud appearance is bilaterally, for which she has been referred  She does complain of some cough mainly dry nonproductive, occasionally white mucoid sputum no hemoptysis  She does have some sinus congestion with the postnasal drip on a constant basis she states  Here for follow-up of the abnormal CT of the chest      Review of Systems   Constitutional: Positive for fatigue  Negative for activity change, appetite change, chills, diaphoresis, fever and unexpected weight change  HENT: Negative for congestion, dental problem, drooling, nosebleeds, postnasal drip, rhinorrhea, sinus pressure, sore throat and voice change  Eyes: Negative for discharge, itching and visual disturbance  Respiratory: Positive for cough (clear sputum, no hemoptysis) and shortness of breath  Negative for wheezing  Cardiovascular: Negative for chest pain, palpitations and leg swelling  Endocrine: Negative for cold intolerance and heat intolerance  Allergic/Immunologic: Negative for food allergies and immunocompromised state  Neurological: Negative for dizziness, facial asymmetry, speech difficulty and weakness  Hematological: Negative for adenopathy  Psychiatric/Behavioral: Negative for agitation, confusion and sleep disturbance   The patient is not nervous/anxious  Smoking history: She reports that she has been smoking cigarettes  She has been smoking about 1 00 pack per day  She has never used smokeless tobacco     The following portions of the patient's history were reviewed and updated as appropriate: allergies, current medications, past family history, past medical history, past social history, past surgical history and problem list     Immunization History   Administered Date(s) Administered    Pneumococcal Conjugate 13-Valent 09/21/2016    Pneumococcal Polysaccharide PPV23 12/14/2011     Current Outpatient Medications   Medication Sig Dispense Refill    amLODIPine (NORVASC) 5 mg tablet Take 1 tablet (5 mg total) by mouth 2 (two) times a day 60 tablet 11    clopidogrel (PLAVIX) 75 mg tablet Take 1 tablet (75 mg total) by mouth daily 90 tablet 1    Icosapent Ethyl (VASCEPA) 1 g CAPS 2 cap bid with meals 360 capsule 2    labetalol (NORMODYNE) 200 mg tablet Take 1 tablet (200 mg total) by mouth 2 (two) times a day 180 tablet 2    losartan (COZAAR) 100 MG tablet Take 100 mg by mouth every morning       losartan (COZAAR) 50 mg tablet Take 1 tablet by mouth daily        nicotine (NICOTROL) 10 MG inhaler Inhale 1 puff as needed for smoking cessation 42 each 5     No current facility-administered medications for this visit  Facility-Administered Medications Ordered in Other Visits   Medication Dose Route Frequency Provider Last Rate Last Dose    acetaminophen (TYLENOL) tablet 650 mg  650 mg Oral Q8H PRN Yoni Flowers MD        ondansetron [facility]) injection 4 mg  4 mg Intravenous Q6H PRN Yoni Flowers MD   4 mg at 02/26/20 1205    sodium chloride 0 9 % infusion  223 mL/hr Intravenous Continuous Yoni Flowers  mL/hr at 02/26/20 0807 223 mL/hr at 02/26/20 0807     Allergies: Atorvastatin; Colesevelam; Colestipol; Ezetimibe; Fenofibrate; Pollen extract;  Rosuvastatin; and Statins    Objective:  Vitals:    02/25/20 1137   BP: 160/100   Pulse: 66   SpO2: 98%   Weight: 74 4 kg (164 lb)   Height: 4' 10" (1 473 m)   Oxygen Therapy  SpO2: 98 %    Wt Readings from Last 3 Encounters:   02/26/20 75 4 kg (166 lb 3 6 oz)   02/25/20 74 8 kg (164 lb 12 8 oz)   02/25/20 74 4 kg (164 lb)     Body mass index is 34 28 kg/m²  Physical Exam   Constitutional: She is oriented to person, place, and time  She appears well-developed and well-nourished  HENT:   Head: Normocephalic and atraumatic  Eyes: Pupils are equal, round, and reactive to light  Conjunctivae are normal    Neck: Normal range of motion  Neck supple  No JVD present  No thyromegaly present  Cardiovascular: Normal rate, regular rhythm and normal heart sounds  Exam reveals no gallop and no friction rub  No murmur heard  Pulmonary/Chest: Effort normal and breath sounds normal  No respiratory distress  She has no wheezes  She has no rales  She exhibits no tenderness  Abdominal: Soft  Bowel sounds are normal    Musculoskeletal: Normal range of motion  She exhibits no edema, tenderness or deformity  Lymphadenopathy:     She has no cervical adenopathy  Neurological: She is alert and oriented to person, place, and time  Skin: Skin is warm and dry  Psychiatric: She has a normal mood and affect  Nursing note and vitals reviewed  Diagnostics:  I have personally reviewed pertinent reports

## 2020-02-25 NOTE — PROGRESS NOTES
NEPHROLOGY OFFICE CONSULT  Lana Lane 69 y o  @SEX @ YOB: 1945 MRN: [id number]    Encounter: [id number] DATE: 2/25/2020    REASON FOR VISIT: Lana Lane is a 69 y  o female who was referred by Lindsay Calloway for further management of chronic kidney disease /RICARDO risk reduction strategies  HPI:    This is 74-year-old female with past medical history significant for hypertension, hyperlipidemia, spinal stenosis, peripheral vascular disease, referred to Nephrology for further management of CKD stage 3/RICARDO risk reduction strategies in the light of undergoing cardiac catheterization soon  Patient's  was also present at the time of consultation and history was also obtained from him and all the questions were answered  Upon review of old medical records including records from Care [facility], patient is currently been followed by nephrologist at [address] [place] [address] and patient was seen by him almost 3 weeks back as a part of RICARDO risk reduction strategies  Upon review of old medical records, baseline creatinine seems to be fluctuating around 1 5-1 6 with last known creatinine of 1 5 with EGFR of 35  Patient earlier developed hematuria and as a part of workup she was also diagnosed with possible mass in urinary bladder suspicious for malignancy and needed cardiac cardiac clearance  As a part of cardiac evaluation, patient would need cardiac catheterization which is currently been schedule for tomorrow-2/26/2020  Patient has underlying hypertension and had underwent renal artery Doppler on 1/7/2020 and also found to have > 60% narrowing of right main renal artery  Left side was difficult to evaluate  Patient had underwent renal ultrasound on 10/29/2019 showed nodular density in the floor of urinary bladder  Left kidney was also atrophic  Patient said that her blood pressure is currently not under well control    Patient take amlodipine along with labetalol and losartan-100 mg in the morning and 50 at night  Patient has also quit smoking almost 2 weeks back  Patient is also currently been followed by neurologist for CVA-lacunar infarct  Patient takes all the medications as prescribed and denies use of NSAIDs  REVIEW OF SYSTEMS:    Review of Systems   Constitutional: Negative for chills and fever  HENT: Negative for nosebleeds and sore throat  Eyes: Negative for photophobia and pain  Respiratory: Negative for choking and wheezing  Cardiovascular: Negative for chest pain and palpitations  Gastrointestinal: Negative for abdominal pain and blood in stool  Endocrine: Negative for cold intolerance and heat intolerance  Genitourinary: Negative for flank pain and hematuria  Musculoskeletal: Negative for joint swelling and neck pain  Skin: Negative for color change and pallor  Allergic/Immunologic: Negative for environmental allergies and immunocompromised state  Neurological: Negative for seizures and syncope  Hematological: Negative for adenopathy  Does not bruise/bleed easily  Psychiatric/Behavioral: Negative for confusion and suicidal ideas           PAST MEDICAL HISTORY:  Past Medical History:   Diagnosis Date    Arthritis     Coronary artery disease     Diabetes mellitus ([facility] )     Endometriosis     High cholesterol     Hypertension     Kidney disease, chronic, stage III (moderate, EGFR 30-59 ml/min) ([facility])     Lumbar disc herniation     Peripheral vascular disease ([facility])     Shoulder injury     left    Spinal stenosis     Stroke ([facility] [address] )        PAST SURGICAL HISTORY:  Past Surgical History:   Procedure Laterality Date    FRACTURE SURGERY      OVARIAN CYST SURGERY      ROTATOR CUFF REPAIR Left     TONSILLECTOMY         SOCIAL HISTORY:  Social History     Substance and Sexual Activity   Alcohol Use No     Social History     Substance and Sexual Activity   Drug Use No     Social History     Tobacco Use   Smoking Status Former Smoker    Packs/day: 1 00    Types: Cigarettes   Smokeless Tobacco Never Used       FAMILY HISTORY:  Family History   Problem Relation Age of Onset    Hypertension Mother     Heart disease Mother         Valvular    Hyperlipidemia Mother     Hypertension Father     Heart defect Father         Cardiomegaly    Stroke Sister         Cerebrovascular Accident    Arthritis Brother     Other Brother         Back Disorder       ALLERGY:  Allergies   Allergen Reactions    Atorvastatin Dizziness and Myalgia     Other reaction(s): Dizziness/Myalgia    Colesevelam      Other reaction(s): leg pains    Colestipol      Other reaction(s): Swelling, Itching    Ezetimibe      Other reaction(s): heartburn  Other reaction(s): Heart Burn    Fenofibrate      Other reaction(s): blood in urine  hx  Kidney Failure    Pollen Extract     Rosuvastatin      Other reaction(s): Leg Cramping    Statins Myalgia       MEDICATIONS:    Current Outpatient Medications:     amLODIPine (NORVASC) 5 mg tablet, Take 1 tablet (5 mg total) by mouth 2 (two) times a day, Disp: 60 tablet, Rfl: 11    clopidogrel (PLAVIX) 75 mg tablet, Take 1 tablet (75 mg total) by mouth daily, Disp: 90 tablet, Rfl: 1    Icosapent Ethyl (VASCEPA) 1 g CAPS, 2 cap bid with meals, Disp: 360 capsule, Rfl: 2    labetalol (NORMODYNE) 200 mg tablet, Take 1 tablet (200 mg total) by mouth 2 (two) times a day, Disp: 180 tablet, Rfl: 2    losartan (COZAAR) 100 MG tablet, Take 100 mg by mouth every morning , Disp: , Rfl:     losartan (COZAAR) 50 mg tablet, Take 1 tablet by mouth daily  , Disp: , Rfl:     nicotine (NICOTROL) 10 MG inhaler, Inhale 1 puff as needed for smoking cessation, Disp: 42 each, Rfl: 5    PHYSICAL EXAM:  Vitals:    02/25/20 1503   BP: (!) 174/98   BP Location: Right arm   Patient Position: Sitting   Cuff Size: Standard   Pulse: 69   Resp: 16   Temp: 97 5 °F (36 4 °C)   TempSrc: Tympanic   Weight: 74 8 kg (164 lb 12 8 oz)   Height: 4' 11" (1 499 m) Body mass index is 33 29 kg/m²  Physical Exam   Constitutional: She appears well-nourished  No distress  HENT:   Head: Normocephalic and atraumatic  Eyes: No scleral icterus  Neck: Neck supple  No JVD present  Cardiovascular: Normal rate, S1 normal and S2 normal    Pulmonary/Chest: Effort normal  No accessory muscle usage  No respiratory distress  She has no wheezes  Abdominal: Soft  She exhibits no distension  Musculoskeletal: She exhibits no edema  Right ankle: She exhibits no swelling  Left ankle: She exhibits no swelling  Right hand: She exhibits no laceration  Left hand: She exhibits no laceration  Lymphadenopathy:        Right cervical: No superficial cervical adenopathy present  Left cervical: No superficial cervical adenopathy present  Neurological: She is alert  She is not disoriented  Skin: Skin is warm  No cyanosis  Psychiatric: She is not combative  She does not exhibit a depressed mood  She expresses no suicidal ideation         LAB RESULTS:  Results for orders placed or performed in visit on 02/10/20   CBC and differential   Result Value Ref Range    White Blood Cell Count 8 5 3 4 - 10 8 x10E3/uL    Red Blood Cell Count 4 94 3 77 - 5 28 x10E6/uL    Hemoglobin 14 7 11 1 - 15 9 g/dL    HCT 42 6 34 0 - 46 6 %    MCV 86 79 - 97 fL    MCH 29 8 26 6 - 33 0 pg    MCHC 34 5 31 5 - 35 7 g/dL    RDW 13 8 11 7 - 15 4 %    Platelet Count 681 333 - 450 x10E3/uL    Neutrophils 64 Not Estab  %    Lymphocytes 23 Not Estab  %    Monocytes 7 Not Estab  %    Eosinophils 4 Not Estab  %    Basophils PCT 1 Not Estab  %    Neutrophils (Absolute) 5 5 1 4 - 7 0 x10E3/uL    Lymphocytes (Absolute) 1 9 0 7 - 3 1 x10E3/uL    Monocytes (Absolute) 0 6 0 1 - 0 9 x10E3/uL    Eosinophils (Absolute) 0 3 0 0 - 0 4 x10E3/uL    Basophils ABS 0 0 0 0 - 0 2 x10E3/uL    Immature Granulocytes 1 Not Estab  %    Immature Granulocytes (Absolute) 0 1 0 0 - 0 1 x10E3/uL   Comprehensive metabolic panel   Result Value Ref Range    Glucose, Random 140 (H) 65 - 99 mg/dL    BUN 26 8 - 27 mg/dL    Creatinine 1 50 (H) 0 57 - 1 00 mg/dL    eGFR Non African American 35 (L) >59 mL/min/1 73    eGFR  40 (L) >59 mL/min/1 73    SL AMB BUN/CREATININE RATIO 17 12 - 28    Sodium 139 134 - 144 mmol/L    Potassium 4 3 3 5 - 5 2 mmol/L    Chloride 101 96 - 106 mmol/L    CO2 21 20 - 29 mmol/L    CALCIUM 9 7 8 7 - 10 3 mg/dL    Protein, Total 6 6 6 0 - 8 5 g/dL    Albumin 4 2 3 7 - 4 7 g/dL    Globulin, Total 2 4 1 5 - 4 5 g/dL    Albumin/Globulin Ratio 1 8 1 2 - 2 2    TOTAL BILIRUBIN 0 3 0 0 - 1 2 mg/dL    Alk Phos Isoenzymes 94 39 - 117 IU/L    AST 13 0 - 40 IU/L    ALT 12 0 - 32 IU/L   Protime-INR   Result Value Ref Range    INR 1 0 0 8 - 1 2    Prothrombin Time 10 4 9 1 - 12 0 sec     Renal ultrasound done on 10/29/2019 showed nodular density in floor of urinary bladder suspicious for cancer/malignancy  Atrophic left kidney  Renal artery Doppler done on 1/7/2020 showed > 60% narrowing/stenosis of right main renal artery  Left side was difficult to evaluate  ASSESSMENT and PLAN:  Buddy Oviedo was seen today for chronic kidney disease and consult  Diagnoses and all orders for this visit:    Stage 3 chronic kidney disease ([address] )  -     Ambulatory referral to Nephrology  -     CBC and differential; Future  -     Basic metabolic panel; Future  -     UA (URINE) with reflex to Scope; Future  -     Microalbumin / creatinine urine ratio; Future  -     Phosphorus; Future  -     Uric acid; Future  -     PTH, intact; Future  -     Vitamin D 25 hydroxy; Future    Hypertensive kidney disease with stage 3 chronic kidney disease (HCC)  -     Basic metabolic panel; Future  -     UA (URINE) with reflex to Scope; Future  -     Microalbumin / creatinine urine ratio; Future    Other proteinuria  -     UA (URINE) with reflex to Scope; Future  -     Microalbumin / creatinine urine ratio; Future      1   CKD stage 3/RICARDO risk reduction strategies/preoperative renal clearance  Patient has underlying chronic kidney disease was attributed due to hypertensive nephrosclerosis on top of atrophic left kidney and advanced age  Upon review of old medical records including records from [facility] everywhere, patient is currently been followed by nephrologist at [address] [place] [address], and was also found to have baseline creatinine between 1 5-1 6 with recent creatinine of 1 5 with EGFR of 35 which seems to be at baseline  Patient was found to have hematuria and as a part of workup was found to have possible bladder malignancy and would require cardiac clearance and scheduled to undergo cardiac catheterization tomorrow-2/26/2020  I have personally discussed the risks and benefits of the cardiac catheterization from a renal standpoint with the patient in depth, and advised the patient about the risk of RICARDO and the course of RICARDO if it occurs with the small probability of the need for renal replacement therapy in the worse case scenario  Patient voiced understanding   From a renal standpoint the patient is renally optimized  for surgery with the following recommendations:   Fluids to administer:  Patient is scheduled to received pre and post cardiac catheterization IV fluid per protocol   Medication Recommendations:   Minimize any IV contrast use (If IV contrast is used, please check BMP POD # 1)   Hold NSAIDs for at least 10 days prior to surgery   Plan to hold losartan today (since patient is scheduled to undergo procedure tomorrow)   Hemodynamic Recommendations:   Ideally, target MAPs greater than 65 mmHg in the perioperative period, and minimize operative time with MAPs < 65 mmHg   Avoid intraop hemodynamic instability to decrease risk for RICARDO occurrence     Other Recommendations:   Please check a BMP POD day # 1 and call if any questions or if Creatinine is rising or electrolyte abnormalities present      I have also discussed consideration of dialysis if GFR goes below 15 and develops uremic symptoms  Patient said that she knows family/friend were on dialysis and she absolutely does not want dialysis even though that would be life-saving procedure  Management of hypertension as mentioned below  Plan to avoid NSAIDs and recheck renal function before next visit  2  Hypertension in chronic kidney disease  Today's blood pressure was elevated and above the goal   According to patient her blood pressure is also staying little bit on the higher side lately  Advised patient to check blood pressure on regular basis and make a log for our review with the next visit  For time being monitor hypertension with amlodipine 5 mg PO BID along with labetalol 200 mg PO daily and losartan (patient is currently taking losartan 100 mg in the morning and 50 at night which is on conventional dosing and higher than recommended and in the future would consider decreasing losartan to 100 mg PO daily which is more recommended dose)  Patient had underwent renal artery Doppler on 1/7/2020 and also found to have > 60% stenosis of right main renal artery  Would recommend medical management prior to considering renal artery stenting  3  Proteinuria  Multifactorial with recent urine microalbumin to creatinine ratio of 943 mg (1/15/2020)  Need better control of hypertension in the future  For time being continue losartan  Recheck proteinuria before next visit  Returns to Nephrology office in 3 months  Future labs ordered  Labs (done on 6/16/2020)  Creatinine 1 58 with EGFR of 32  Hemoglobin 13 8  Urine analysis showed dysuria consider antibiotic if patient is symptomatic  Urine microalbumin to creatinine ratio of 517 mg-> continue losartan  Uric acid 8 1-> monitor Normal PTH (86), sodium, potassium, bicarb, calcium and phosphorus      Portions of the record may have been created with voice recognition software  Occasional wrong word or "sound a like" substitutions may have occurred due to the inherent limitations of voice recognition software  Read the chart carefully and recognize, using context, where substitutions have occurred  If you have any questions, please contact the dictating provider

## 2020-02-26 ENCOUNTER — HOSPITAL ENCOUNTER (OUTPATIENT)
Dept: INTERVENTIONAL RADIOLOGY/VASCULAR | Facility: HOSPITAL | Age: 75
Discharge: HOME | End: 2020-02-26
Attending: INTERNAL MEDICINE | Admitting: INTERNAL MEDICINE
Payer: COMMERCIAL

## 2020-02-26 VITALS
HEIGHT: 58 IN | WEIGHT: 166.23 LBS | SYSTOLIC BLOOD PRESSURE: 152 MMHG | BODY MASS INDEX: 34.89 KG/M2 | HEART RATE: 71 BPM | RESPIRATION RATE: 18 BRPM | TEMPERATURE: 97.6 F | DIASTOLIC BLOOD PRESSURE: 69 MMHG | OXYGEN SATURATION: 99 %

## 2020-02-26 DIAGNOSIS — I15.2 HYPERTENSION ASSOCIATED WITH DIABETES (HCC): ICD-10-CM

## 2020-02-26 DIAGNOSIS — E11.59 HYPERTENSION ASSOCIATED WITH DIABETES (HCC): ICD-10-CM

## 2020-02-26 DIAGNOSIS — R94.39 POSITIVE CARDIAC STRESS TEST: ICD-10-CM

## 2020-02-26 DIAGNOSIS — R06.02 SHORTNESS OF BREATH: ICD-10-CM

## 2020-02-26 LAB
ANION GAP SERPL CALCULATED.3IONS-SCNC: 12 MMOL/L (ref 4–13)
BUN SERPL-MCNC: 35 MG/DL (ref 5–25)
CALCIUM SERPL-MCNC: 9.4 MG/DL (ref 8.3–10.1)
CHLORIDE SERPL-SCNC: 103 MMOL/L (ref 100–108)
CO2 SERPL-SCNC: 22 MMOL/L (ref 21–32)
CREAT SERPL-MCNC: 1.59 MG/DL (ref 0.6–1.3)
GFR SERPL CREATININE-BSD FRML MDRD: 32 ML/MIN/1.73SQ M
GLUCOSE P FAST SERPL-MCNC: 144 MG/DL (ref 65–99)
GLUCOSE SERPL-MCNC: 144 MG/DL (ref 65–140)
GLUCOSE SERPL-MCNC: 145 MG/DL (ref 65–140)
POTASSIUM SERPL-SCNC: 4.7 MMOL/L (ref 3.5–5.3)
SODIUM SERPL-SCNC: 137 MMOL/L (ref 136–145)

## 2020-02-26 PROCEDURE — C1769 GUIDE WIRE: HCPCS | Performed by: INTERNAL MEDICINE

## 2020-02-26 PROCEDURE — 80048 BASIC METABOLIC PNL TOTAL CA: CPT | Performed by: INTERNAL MEDICINE

## 2020-02-26 PROCEDURE — C1894 INTRO/SHEATH, NON-LASER: HCPCS | Performed by: INTERNAL MEDICINE

## 2020-02-26 PROCEDURE — 99152 MOD SED SAME PHYS/QHP 5/>YRS: CPT | Performed by: INTERNAL MEDICINE

## 2020-02-26 PROCEDURE — 93458 L HRT ARTERY/VENTRICLE ANGIO: CPT | Performed by: INTERNAL MEDICINE

## 2020-02-26 PROCEDURE — 82948 REAGENT STRIP/BLOOD GLUCOSE: CPT

## 2020-02-26 PROCEDURE — 99153 MOD SED SAME PHYS/QHP EA: CPT | Performed by: INTERNAL MEDICINE

## 2020-02-26 RX ORDER — FENTANYL CITRATE 50 UG/ML
INJECTION, SOLUTION INTRAMUSCULAR; INTRAVENOUS CODE/TRAUMA/SEDATION MEDICATION
Status: COMPLETED | OUTPATIENT
Start: 2020-02-26 | End: 2020-02-26

## 2020-02-26 RX ORDER — NITROGLYCERIN 20 MG/100ML
INJECTION INTRAVENOUS CODE/TRAUMA/SEDATION MEDICATION
Status: COMPLETED | OUTPATIENT
Start: 2020-02-26 | End: 2020-02-26

## 2020-02-26 RX ORDER — MIDAZOLAM HYDROCHLORIDE 2 MG/2ML
INJECTION, SOLUTION INTRAMUSCULAR; INTRAVENOUS CODE/TRAUMA/SEDATION MEDICATION
Status: COMPLETED | OUTPATIENT
Start: 2020-02-26 | End: 2020-02-26

## 2020-02-26 RX ORDER — HYDRALAZINE HYDROCHLORIDE 20 MG/ML
INJECTION INTRAMUSCULAR; INTRAVENOUS CODE/TRAUMA/SEDATION MEDICATION
Status: COMPLETED | OUTPATIENT
Start: 2020-02-26 | End: 2020-02-26

## 2020-02-26 RX ORDER — LIDOCAINE WITH 8.4% SOD BICARB 0.9%(10ML)
SYRINGE (ML) INJECTION CODE/TRAUMA/SEDATION MEDICATION
Status: COMPLETED | OUTPATIENT
Start: 2020-02-26 | End: 2020-02-26

## 2020-02-26 RX ORDER — SODIUM CHLORIDE 9 MG/ML
223 INJECTION, SOLUTION INTRAVENOUS CONTINUOUS
Status: DISCONTINUED | OUTPATIENT
Start: 2020-02-26 | End: 2020-03-01 | Stop reason: HOSPADM

## 2020-02-26 RX ORDER — SODIUM CHLORIDE 9 MG/ML
100 INJECTION, SOLUTION INTRAVENOUS CONTINUOUS
Status: DISPENSED | OUTPATIENT
Start: 2020-02-26 | End: 2020-02-26

## 2020-02-26 RX ORDER — ONDANSETRON 2 MG/ML
4 INJECTION INTRAMUSCULAR; INTRAVENOUS EVERY 6 HOURS PRN
Status: DISCONTINUED | OUTPATIENT
Start: 2020-02-26 | End: 2020-03-01 | Stop reason: HOSPADM

## 2020-02-26 RX ORDER — AMLODIPINE BESYLATE 5 MG/1
TABLET ORAL CODE/TRAUMA/SEDATION MEDICATION
Status: COMPLETED | OUTPATIENT
Start: 2020-02-26 | End: 2020-02-26

## 2020-02-26 RX ORDER — ACETAMINOPHEN 325 MG/1
650 TABLET ORAL EVERY 8 HOURS PRN
Status: DISCONTINUED | OUTPATIENT
Start: 2020-02-26 | End: 2020-03-01 | Stop reason: HOSPADM

## 2020-02-26 RX ADMIN — AMLODIPINE BESYLATE 5 MG: 5 TABLET ORAL at 11:27

## 2020-02-26 RX ADMIN — IODIXANOL 75 ML: 320 INJECTION, SOLUTION INTRAVASCULAR at 11:28

## 2020-02-26 RX ADMIN — NITROGLYCERIN 1 INCH: 20 OINTMENT TOPICAL at 11:11

## 2020-02-26 RX ADMIN — HYDRALAZINE HYDROCHLORIDE 10 MG: 20 INJECTION INTRAMUSCULAR; INTRAVENOUS at 11:01

## 2020-02-26 RX ADMIN — NITROGLYCERIN 200 MCG: 20 INJECTION INTRAVENOUS at 11:22

## 2020-02-26 RX ADMIN — Medication 2 ML: at 10:46

## 2020-02-26 RX ADMIN — MIDAZOLAM HYDROCHLORIDE 1 MG: 1 INJECTION, SOLUTION INTRAMUSCULAR; INTRAVENOUS at 10:45

## 2020-02-26 RX ADMIN — FENTANYL CITRATE 25 MCG: 50 INJECTION, SOLUTION INTRAMUSCULAR; INTRAVENOUS at 11:32

## 2020-02-26 RX ADMIN — FENTANYL CITRATE 25 MCG: 50 INJECTION, SOLUTION INTRAMUSCULAR; INTRAVENOUS at 10:45

## 2020-02-26 RX ADMIN — HYDRALAZINE HYDROCHLORIDE 10 MG: 20 INJECTION INTRAMUSCULAR; INTRAVENOUS at 11:07

## 2020-02-26 RX ADMIN — SODIUM CHLORIDE 223 ML/HR: 0.9 INJECTION, SOLUTION INTRAVENOUS at 08:07

## 2020-02-26 RX ADMIN — ONDANSETRON 4 MG: 2 INJECTION INTRAMUSCULAR; INTRAVENOUS at 12:05

## 2020-02-26 RX ADMIN — Medication 6 ML: at 10:51

## 2020-02-26 NOTE — PERIOPERATIVE NURSING NOTE
Patient nauseous and vomitng  Nitropaste removed and zofran IVP administered  Patient reports symptoms resolving

## 2020-02-26 NOTE — DISCHARGE INSTRUCTIONS
After Heart Catheterization   AMBULATORY CARE:   Call 911 for any of the following:   · You have any of the following signs of a heart attack:      ¨ Squeezing, pressure, or pain in your chest that lasts longer than 5 minutes or returns    ¨ Discomfort or pain in your back, neck, jaw, stomach, or arm     ¨ Trouble breathing    ¨ Nausea or vomiting    ¨ Lightheadedness or a sudden cold sweat, especially with chest pain or trouble breathing    · You have any of the following signs of a stroke:      ¨ Numbness or drooping on one side of your face     ¨ Weakness in an arm or leg    ¨ Confusion or difficulty speaking    ¨ Dizziness, a severe headache, or vision loss    · You feel lightheaded, short of breath, and have chest pain  · You cough up blood  · You have trouble breathing  · You cannot stop the bleeding from your wound even after you hold firm pressure for 10 minutes  Seek care immediately if:   · Blood soaks through your bandage  · Your stitches come apart  · Your arm or leg feels numb, cool, or looks pale  · Your wound gets swollen quickly  Contact your healthcare provider if:   · You have a fever or chills  · Your wound is red, swollen, or draining pus  · Your wound looks more bruised or you have new bruising on the side of your leg or arm  · You have nausea or are vomiting  · Your skin is itchy, swollen, or you have a rash  · You have questions or concerns about your condition or care  Medicines: You may need any of the following:  · Blood thinners    help prevent blood clots  Examples of blood thinners include heparin and warfarin  Clots can cause strokes, heart attacks, and death  The following are general safety guidelines to follow while you are taking a blood thinner:    ¨ Watch for bleeding and bruising while you take blood thinners  Watch for bleeding from your gums or nose  Watch for blood in your urine and bowel movements   Use a soft washcloth on your skin, and a soft toothbrush to brush your teeth  This can keep your skin and gums from bleeding  If you shave, use an electric shaver  Do not play contact sports  ¨ Tell your dentist and other healthcare providers that you take anticoagulants  Wear a bracelet or necklace that says you take this medicine  ¨ Do not start or stop any medicines unless your healthcare provider tells you to  Many medicines cannot be used with blood thinners  ¨ Tell your healthcare provider right away if you forget to take the medicine, or if you take too much  ¨ Warfarin  is a blood thinner that you may need to take  The following are things you should be aware of if you take warfarin  § Foods and medicines can affect the amount of warfarin in your blood  Do not make major changes to your diet while you take warfarin  Warfarin works best when you eat about the same amount of vitamin K every day  Vitamin K is found in green leafy vegetables and certain other foods  Ask for more information about what to eat when you are taking warfarin  § You will need to see your healthcare provider for follow-up visits when you are on warfarin  You will need regular blood tests  These tests are used to decide how much medicine you need  · Acetaminophen  helps decrease pain and fever  This medicine is available without a doctor's order  Ask how much medicine is safe to take, and how often to take it  Acetaminophen can cause liver damage if not taken correctly  · Take your medicine as directed  Contact your healthcare provider if you think your medicine is not helping or if you have side effects  Tell him or her if you are allergic to any medicine  Keep a list of the medicines, vitamins, and herbs you take  Include the amounts, and when and why you take them  Bring the list or the pill bottles to follow-up visits  Carry your medicine list with you in case of an emergency  Bathing:   You may be able to shower the day after your procedure  Remove your pressure bandage before you shower  Do not take baths or go in hot tubs or pools  Carefully wash the wound with soap and water  Pat the area dry  Care for your wound as directed:  Change your bandage when it gets wet or dirty  A small bandage can be placed on your wound after you remove the pressure bandage  Do not put powders, lotions, or creams on your wound  They may cause your wound to get infected  Monitor your wound every day for signs of infection, such as redness, swelling, or pus  Mild bruising is normal and expected  If bleeding from your wound occurs:  Apply firm, steady pressure to stop the bleeding  Apply pressure with a clean gauze or towel for 5 to 10 minutes  Call 911 if bleeding becomes heavy or does not stop  Activity:  Do not lift anything heavier than 5 pounds until directed by your healthcare provider  Heavy lifting can put stress on your wound and cause bleeding  Do not push or pull with the arm that was used for the procedure  Do not do vigorous activity for at least 48 hours  Vigorous activity may cause bleeding from your wound  Rest and do quiet activities  Short walks to the bathroom and around the house are okay  Limit your stair climbing to prevent bleeding  Ask your healthcare provider when you can return to your normal activities  Do not strain when you have a bowel movement:  Your wound may bleed if you strain to have a bowel movement  Keep your legs flat on the floor and your hips at a 90° angle  Talk to your healthcare provider if you are constipated  You may need medicine to make it easier for you to have a bowel movement and to prevent straining  Drink liquids as directed:  Liquids will help flush the contrast liquid from your body  Ask how much liquid to drink each day and which liquids are best for you  Driving:  Ask your healthcare provider when it is okay for you to drive   He may tell you to wait 48 hours before you drive to decrease your risk for bleeding  Returning to work: You may not be able to return to work for at least 2 days after your procedure if your job involves heavy lifting  Ask your healthcare provider when it is okay for you to return to work  © 2017 [employer] Information is for End User's use only and may not be sold, redistributed or otherwise used for commercial purposes  All illustrations and images included in CareNotes® are the copyrighted property of [employer]  or [employer]  The above information is an  only  It is not intended as medical advice for individual conditions or treatments  Talk to your doctor, nurse or pharmacist before following any medical regimen to see if it is safe and effective for you

## 2020-02-26 NOTE — INTERVAL H&P NOTE
Update: (This section must be completed if the H&P was completed greater than 24 hrs to procedure or admission)    H&P reviewed  After examining the patient, I find no changed to the H&P since it had been written  Patient re-evaluated  Accept as history and physical   Risks(including vascular injury, cardiac arrhythmia, heart attack and death), benefits and alternatives of cardiac catheterization including possible coronary intervention explained to the patient  Patient understands and agrees  Patient is aware about chronic kidney disease and risk of getting a KI a small risk of possible requiring hemodialysis post catheterization    She understands and willing to proceed  BP (!) 188/83   Pulse 60   Temp 97 6 °F (36 4 °C) (Oral)   Resp 18   Ht 4' 10" (1 473 m)   Wt 75 4 kg (166 lb 3 6 oz)   LMP  (LMP Unknown)   SpO2 98%   BMI 34 74 kg/m²   Chandu Devi MD/February 26, 2020/8:36 AM

## 2020-03-03 ENCOUNTER — OFFICE VISIT (OUTPATIENT)
Dept: VASCULAR SURGERY | Facility: CLINIC | Age: 75
End: 2020-03-03
Payer: COMMERCIAL

## 2020-03-03 VITALS
RESPIRATION RATE: 18 BRPM | BODY MASS INDEX: 35.26 KG/M2 | DIASTOLIC BLOOD PRESSURE: 74 MMHG | TEMPERATURE: 98.7 F | SYSTOLIC BLOOD PRESSURE: 158 MMHG | HEART RATE: 66 BPM | WEIGHT: 168 LBS | HEIGHT: 58 IN

## 2020-03-03 DIAGNOSIS — I70.1 ATHEROSCLEROSIS OF RENAL ARTERY (HCC): ICD-10-CM

## 2020-03-03 DIAGNOSIS — I70.213 ATHEROSCLEROSIS OF NATIVE ARTERIES OF EXTREMITIES WITH INTERMITTENT CLAUDICATION, BILATERAL LEGS (HCC): Primary | ICD-10-CM

## 2020-03-03 DIAGNOSIS — I73.9 CLAUDICATION IN PERIPHERAL VASCULAR DISEASE (HCC): ICD-10-CM

## 2020-03-03 DIAGNOSIS — N18.30 STAGE 3 CHRONIC KIDNEY DISEASE (HCC): ICD-10-CM

## 2020-03-03 DIAGNOSIS — E78.00 HYPERCHOLESTEREMIA: ICD-10-CM

## 2020-03-03 DIAGNOSIS — I77.1 CELIAC ARTERY STENOSIS (HCC): ICD-10-CM

## 2020-03-03 DIAGNOSIS — I70.1 RIGHT RENAL ARTERY STENOSIS (HCC): ICD-10-CM

## 2020-03-03 DIAGNOSIS — K55.1 SUPERIOR MESENTERIC ARTERY STENOSIS (HCC): ICD-10-CM

## 2020-03-03 DIAGNOSIS — I70.201 FEMORAL ARTERY STENOSIS, RIGHT (HCC): ICD-10-CM

## 2020-03-03 DIAGNOSIS — I65.23 ASYMPTOMATIC BILATERAL CAROTID ARTERY STENOSIS: ICD-10-CM

## 2020-03-03 PROCEDURE — 3078F DIAST BP <80 MM HG: CPT | Performed by: NURSE PRACTITIONER

## 2020-03-03 PROCEDURE — 3066F NEPHROPATHY DOC TX: CPT | Performed by: NURSE PRACTITIONER

## 2020-03-03 PROCEDURE — 4040F PNEUMOC VAC/ADMIN/RCVD: CPT | Performed by: NURSE PRACTITIONER

## 2020-03-03 PROCEDURE — 99214 OFFICE O/P EST MOD 30 MIN: CPT | Performed by: NURSE PRACTITIONER

## 2020-03-03 PROCEDURE — 3008F BODY MASS INDEX DOCD: CPT | Performed by: NURSE PRACTITIONER

## 2020-03-03 PROCEDURE — 1170F FXNL STATUS ASSESSED: CPT | Performed by: NURSE PRACTITIONER

## 2020-03-03 PROCEDURE — 1160F RVW MEDS BY RX/DR IN RCRD: CPT | Performed by: NURSE PRACTITIONER

## 2020-03-03 PROCEDURE — 3077F SYST BP >= 140 MM HG: CPT | Performed by: NURSE PRACTITIONER

## 2020-03-03 NOTE — ASSESSMENT & PLAN NOTE
-Labs reviewed, CKD III appears stable with Cr 1 59/GFR 32, baseline per nephro 1 5-1 6/GFR 35  -Avoid nephrotoxic agents  -Would proceed to contrast based imaging with caution

## 2020-03-03 NOTE — PATIENT INSTRUCTIONS
Peripheral arterial disease  -I reviewed the results of your recent lower extremity arterial duplex  You have peripheral arterial disease to both legs  This is what is causing the bilateral calf cramping when you're walking  -continue to remain as active as possible  You are enhancing collateral flow by remaining active  If pain were to become worse and lifestyle limiting we can always proceed with an arteriogram  -at this time you would like to pursue medical management  Continue Plavix  You are unable to tolerate statin medications, so continue diet modifications  -we will repeat an arterial duplex of the legs in 6 months  Follow up with me after repeat imaging to recheck symptoms    Renal artery stenosis and chronic kidney disease  -I reviewed the results of your recent renal artery duplex  You have right renal artery stenosis and an atrophic left kidney  Sometimes renal artery stenosis can contribute to high blood pressure  -your blood pressure in office today was acceptable  Continue all your current blood pressure medications  -continue follow-up with Dr Meaghan Leung as scheduled   -if your blood pressure becomes uncontrolled despite being on blood pressure medication notify our office    At that time we would need to consider stenting your renal arteries    Carotid artery stenosis  -this was mild on duplex imaging in July 2019  -we will plan for repeat imaging in 6 months

## 2020-03-03 NOTE — ASSESSMENT & PLAN NOTE
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

## 2020-03-03 NOTE — PROGRESS NOTES
Assessment/Plan:    68 y/o F with HTN, HLD, CKD III, DM, hx lacunar CVA, CAD s/p recent cath with multivessel disease, PAD, bladder mass pending surgical removal, and renal artery stenosis, seen for vascular evaluation  Atherosclerosis of native arteries of extremities with intermittent claudication, bilateral legs (HCC)  -LEAD-  R CFA 50-75% stenosis, SFA disease, R LISA 0 66/118/87; L prox SFA occlusion, patent AT/peroneal vessels, L LISA 0 64/116/77  -Experiencing claudication at approximately 100 feet  Due to short distance nature of symptoms recommended arteriogram   However, she does not wish to pursue intervention at this time  In the absence of rest pain or tissue loss this is ok  If her pain worsens she will reconsider intervention at that time  -Will monitor with repeat LEAD in 6 months  -Followup after repeat imaging for recheck or sooner if any change in symptoms    Mesenteric artery stenosis ([address] )  -AOIL (8/2019)- with SMA >70% stenosis, celiac patent  Prior imaging with celiac stenosis >70%  -Patient is asymptomatic  Denies any postprandial abdominal pain or weight loss  Obese with BMI 35 11  -Continue aspirin  -Will monitor with routine duplex imaging (imaged on renal duplex)  -No vascular intervention unless symptomatic    Right renal artery stenosis (HCC)  -Renal duplex (1/7/2020)- Right renal artery with >60% stenosis, left renal poorly visualized and known to be atrophic on duplex from 10/29/19  -Her blood pressure is poorly controlled with SBP >200 at time of duplex  On review of medical record appears to run SBP 170s; today /74  -She is on 3 antihypertensive agents- amlodipine, labetalol and losartan  -CKD III with stable renal function on lab review  -Recently evaluated by Dr Saeid Alvarez, nephrology  -Recommend maximizing antihypertensive regimen for better BP control    If BP remains uncontrolled can revisit possible renal intervention at that time  -Advised patient to keep a BP log at home   She states she had a BP cuff at home, but it broke recently, she will consider replacing    Stage 3 chronic kidney disease ([address] )  -Labs reviewed, CKD III appears stable with Cr 1 59/GFR 32, baseline per nephro 1 5-1 6/GFR 35  -Avoid nephrotoxic agents  -Would proceed to contrast based imaging with caution    Hypercholesteremia  -Statin intolerant  -Discussed diet and lifestyle modifications to optimize lipid management    Asymptomatic bilateral carotid artery stenosis  -CV duplex (7/2019)- B ICA less than 50% stenosis  -Continue aspirin  -She will enter our surveillance monitoring program with annual CV duplex       Diagnoses and all orders for this visit:    Atherosclerosis of native arteries of extremities with intermittent claudication, bilateral legs ([address] )    Right renal artery stenosis ([facility] [address] )    Superior mesenteric artery stenosis ([address] ) left  -     Ambulatory referral to Vascular Surgery  -     Cancel: VAS abdominal aorta/iliacs; complete study; Future    Stage 3 chronic kidney disease (HCC)    Asymptomatic bilateral carotid artery stenosis  -     VAS carotid complete study; Future    Hypercholesteremia    Celiac artery stenosis (HCC) >70% stenosis in the celiac trunk  -     Ambulatory referral to Vascular Surgery  -     Cancel: VAS abdominal aorta/iliacs; complete study; Future    Claudication in peripheral vascular disease ([address] )  -     Ambulatory referral to Vascular Surgery  -     VAS lower limb arterial duplex, complete bilateral; Future  -     Cancel: VAS abdominal aorta/iliacs; complete study; Future    Atherosclerosis of renal artery ([facility] [address] )  -     Ambulatory referral to Vascular Surgery  -     VAS renal artery complete; Future    Femoral artery stenosis, right (HCC) 50-75% stenosis in the common femoral artery  -     Ambulatory referral to Vascular Surgery          Subjective:      Patient ID: Carie Perdomo is a 69 y o  female  Patient referred by her PCP for evaluation of vascular status  She was seen by Dr Valene Homans for PAD in Feb 2019  She had renal duplex and LEAD done 1/7/2020  She complains of bilateral lower extremity calf claudication when ambulating more than 100 feet  The pain is worse in the right leg  Pain resolves relatively quickly with rest   She denies any rest pain symptoms  No tissue loss  She states that she can walk well on flat surface, but hills/inclines are challenging  She walks her dog daily, some days without pain and other with pain  She has HTN  Her BP is 158/74 today and she states it has been high for years  She states that she had a BP cuff at home that she used to monitor her BP with, but it broke, so now she only gets it checked at doctor appts  She reports that she has CKD and had been established with nephrologist at [facility], but recently saw Dr Felicita Lux, [facility] nephrology, prior to planned cardiac catheterization, and plans to remain with him as she wants all her care to be at [facility]  Chart hx of stroke  Patient denies any TIA/CVA symptoms; denies amaurosis fugax, lateralizing weakness or numbness, slurred speech or facial droop  She recently quit smoking  PT is here to review her TEA and Renal Artery Complete of 1/7/2020  Pt can walk at most 100 feet before her leg hurt and she has to rest   Pt then rests 1-2 minutes for relief  She denies any abdominal, back or flank pain  She denies any nausea, vomiting or bloating after eating  Pt advised her blood pressure is not totally under control  Pt was last seen on 2/25/2020 for lower extremity claudication and left Aortoiliac Artery Stenosis  Pt has a Hx of Mesenteric Artery Stenosis, Celiac Artery Stenosis, Renal Artery Stenting, DM Type 2, HTN, CKD Stage 3 and HLD  Pt is currently taking Plavix          The following portions of the patient's history were reviewed and updated as appropriate: allergies, current medications, past family history, past medical history, past social history, past surgical history and problem list     Review of Systems   Constitutional: Negative  HENT: Negative  Eyes: Negative  Respiratory: Negative  Cardiovascular: Negative  Gastrointestinal: Negative  Endocrine: Negative  Genitourinary: Negative  Musculoskeletal: Positive for gait problem  Leg pain w/walking   Skin: Negative  Allergic/Immunologic: Positive for environmental allergies  Neurological: Negative for dizziness, light-headedness and headaches  Hematological: Bruises/bleeds easily  Psychiatric/Behavioral: Negative  Objective:       Physical Exam   Constitutional: She is oriented to person, place, and time  No distress  HENT:   Head: Normocephalic and atraumatic  Eyes: No scleral icterus  Neck: Neck supple  No JVD present  Bilateral carotid bruits   Cardiovascular: Normal rate and regular rhythm  Pulses:       Femoral pulses are 1+ on the right side, and 1+ on the left side  Popliteal pulses are 0 on the right side, and 0 on the left side  Dorsalis pedis pulses are 0 on the right side, and 0 on the left side  Posterior tibial pulses are 0 on the right side, and 0 on the left side  Pulmonary/Chest: Effort normal and breath sounds normal    Abdominal: Soft  Bowel sounds are normal  She exhibits no distension and no mass  There is no tenderness  There is no guarding  Musculoskeletal: She exhibits no edema  Neurological: She is alert and oriented to person, place, and time  Skin: Skin is warm and dry  Psychiatric: She has a normal mood and affect  I have reviewed and made appropriate changes to the review of systems input by the medical assistant      Vitals:    03/03/20 0942   BP: 158/74   BP Location: Left arm   Patient Position: Sitting   Cuff Size: Adult   Pulse: 66   Resp: 18   Temp: 98 7 °F (37 1 °C)   TempSrc: Tympanic   Weight: 76 2 kg (168 lb)   Height: 4' 10" (1 473 m)       Patient Active Problem List   Diagnosis    Spinal stenosis    Basilar artery stenosis    Breast mass    [facility]vascular accident ([facility]A) [facility]    Chronic GERD    Stage 3 chronic kidney disease (HCC)    Claudication in peripheral vascular disease ([facility] )    Depression with anxiety    Type 2 diabetes mellitus with diabetic nephropathy (HCC)    Endometriosis    Gout    Hx-TIA (transient ischemic attack)    Hypercholesteremia    Hyperlipidemia associated with type 2 diabetes mellitus ([facility] )    Hypertension    Hypertension associated with diabetes ([facility] )    Osteoarthritis    Obesity (BMI 30-39  9)    Polyneuropathy    Right renal artery stenosis (HCC)    Tobacco use disorder    Vertebrobasilar artery syndrome    Vitamin D deficiency    Statin intolerance    Lumbar disc herniation    Pain of left lower extremity    Abnormal EKG    Spondylosis of lumbar region without myelopathy or radiculopathy    Aortoiliac stenosis, left (HCC)    Hypokalemia    Acute drug-induced gout of left foot    Gross hematuria on tissue paper after wiping    Decreased pulses in feet    Refused influenza vaccine    Hypercalcemia    Lumbosacral spondylosis without myelopathy    Neurodermatitis    Other proteinuria    Obesity with body mass index 30 or greater    Hyperlipidemia, unspecified    Herniated lumbar intervertebral disc    Atherosclerosis of renal artery (HCC)    Bladder tumor    Celiac artery stenosis (HCC) >70% stenosis in the celiac trunk    Abnormal CT of the chest suspicious for an infectious or inflammatory bronchiolitis   Positive cardiac stress test  small, mildly severe, partially reversible myocardial perfusion defect of anterior and inferior wall     Femoral artery stenosis, right (HCC) 50-75% stenosis in the common femoral artery      Mesenteric artery stenosis ([facility] )    Immunization not carried out because of patient refusal    Median arcuate ligament syndrome ([facility] )    Abdominal bruit    Initial Medicare annual wellness visit    Hypertensive kidney disease with stage 3 chronic kidney disease ([facility] [address] )    Atherosclerosis of native arteries of extremities with intermittent claudication, bilateral legs (HCC)    Asymptomatic bilateral carotid artery stenosis       Past Surgical History:   Procedure Laterality Date    FRACTURE SURGERY      OVARIAN CYST SURGERY      ROTATOR CUFF REPAIR Left     TONSILLECTOMY         Family History   Problem Relation Age of Onset    Hypertension Mother     Heart disease Mother         Valvular    Hyperlipidemia Mother     Hypertension Father     Heart defect Father         Cardiomegaly    Stroke Sister         Cerebrovascular Accident    Arthritis Brother     Other Brother         Back Disorder       Social History     Socioeconomic History    Marital status: /Civil Union     Spouse name: Not on file    Number of children: Not on file    Years of education: Not on file    Highest education level: Not on file   Occupational History    Not on file   Social Needs    Financial resource strain: Not on file    Food insecurity:     Worry: Not on file     Inability: Not on file    Transportation needs:     Medical: Not on file     Non-medical: Not on file   Tobacco Use    Smoking status: Light Tobacco Smoker     Packs/day: 1 00     Types: Cigarettes    Smokeless tobacco: Never Used   Substance and Sexual Activity    Alcohol use: No    Drug use: No    Sexual activity: Not Currently     Partners: Male   Lifestyle    Physical activity:     Days per week: Not on file     Minutes per session: Not on file    Stress: Not on file   Relationships    Social connections:     Talks on phone: Not on file     Gets together: Not on file     Attends Confucianist service: Not on file     Active member of club or organization: Not on file     Attends meetings of clubs or organizations: Not on file     Relationship status: Not on file    Intimate partner violence:     Fear of current or ex partner: Not on file Emotionally abused: Not on file     Physically abused: Not on file     Forced sexual activity: Not on file   Other Topics Concern    Not on file   Social History Narrative    Occasional Caffeine Consumption       Allergies   Allergen Reactions    Atorvastatin Dizziness and Myalgia     Other reaction(s): Dizziness/Myalgia    Colesevelam      Other reaction(s): leg pains    Colestipol      Other reaction(s): Swelling, Itching    Ezetimibe      Other reaction(s): heartburn  Other reaction(s): Heart Burn    Fenofibrate      Other reaction(s): blood in urine  hx  Kidney Failure    Pollen Extract     Rosuvastatin      Other reaction(s): Leg Cramping    Statins Myalgia         Current Outpatient Medications:     amLODIPine (NORVASC) 5 mg tablet, Take 1 tablet (5 mg total) by mouth 2 (two) times a day, Disp: 60 tablet, Rfl: 11    clopidogrel (PLAVIX) 75 mg tablet, Take 1 tablet (75 mg total) by mouth daily, Disp: 90 tablet, Rfl: 1    Icosapent Ethyl (VASCEPA) 1 g CAPS, 2 cap bid with meals, Disp: 360 capsule, Rfl: 2    labetalol (NORMODYNE) 200 mg tablet, Take 1 tablet (200 mg total) by mouth 2 (two) times a day, Disp: 180 tablet, Rfl: 2    losartan (COZAAR) 100 MG tablet, Take 100 mg by mouth every morning , Disp: , Rfl:     losartan (COZAAR) 50 mg tablet, Take 1 tablet by mouth daily  , Disp: , Rfl:     nicotine (NICOTROL) 10 MG inhaler, Inhale 1 puff as needed for smoking cessation, Disp: 42 each, Rfl: 5

## 2020-03-03 NOTE — ASSESSMENT & PLAN NOTE
-LEAD-  R CFA 50-75% stenosis, SFA disease, R LISA 0 66/118/87; L prox SFA occlusion, patent AT/peroneal vessels, L LISA 0 64/116/77  -Experiencing claudication at approximately 100 feet  Due to short distance nature of symptoms recommended arteriogram   However, she does not wish to pursue intervention at this time  In the absence of rest pain or tissue loss this is ok    If her pain worsens she will reconsider intervention at that time  -Will monitor with repeat LEAD in 6 months  -Followup after repeat imaging for recheck or sooner if any change in symptoms CC:parent

## 2020-03-03 NOTE — ASSESSMENT & PLAN NOTE
-CV duplex (7/2019)- B ICA less than 50% stenosis  -Continue aspirin  -She will enter our surveillance monitoring program with annual CV duplex

## 2020-03-03 NOTE — ASSESSMENT & PLAN NOTE
-Renal duplex (1/7/2020)- Right renal artery with >60% stenosis, left renal poorly visualized and known to be atrophic on duplex from 10/29/19  -Her blood pressure is poorly controlled with SBP >200 at time of duplex  On review of medical record appears to run SBP 170s; today /74  -She is on 3 antihypertensive agents- amlodipine, labetalol and losartan  -CKD III with stable renal function on lab review  -Recently evaluated by Dr Brien Preston, nephrology  -Recommend maximizing antihypertensive regimen for better BP control  If BP remains uncontrolled can revisit possible renal intervention at that time  -Advised patient to keep a BP log at home    She states she had a BP cuff at home, but it broke recently, she will consider replacing

## 2020-03-19 ENCOUNTER — TELEPHONE (OUTPATIENT)
Dept: UROLOGY | Facility: MEDICAL CENTER | Age: 75
End: 2020-03-19

## 2020-03-19 ENCOUNTER — PREP FOR PROCEDURE (OUTPATIENT)
Dept: UROLOGY | Facility: CLINIC | Age: 75
End: 2020-03-19

## 2020-03-19 ENCOUNTER — TELEPHONE (OUTPATIENT)
Dept: INTERNAL MEDICINE CLINIC | Facility: CLINIC | Age: 75
End: 2020-03-19

## 2020-03-19 DIAGNOSIS — R91.1 PULMONARY NODULE: Primary | ICD-10-CM

## 2020-03-19 DIAGNOSIS — D49.4 BLADDER TUMOR: Primary | ICD-10-CM

## 2020-03-19 RX ORDER — ACETAMINOPHEN 325 MG/1
975 TABLET ORAL ONCE
Status: CANCELLED | OUTPATIENT
Start: 2020-03-19 | End: 2020-03-19

## 2020-03-19 RX ORDER — GABAPENTIN 100 MG/1
300 CAPSULE ORAL ONCE
Status: CANCELLED | OUTPATIENT
Start: 2020-03-19 | End: 2020-03-19

## 2020-03-19 NOTE — PROGRESS NOTES
Staff was asked to call patient and inform her S she does have a pulmonary nodule in the right upper lobe unchanged since October 2019 but still needs follow-up with Pulmonary

## 2020-03-19 NOTE — TELEPHONE ENCOUNTER
Please see note  Patient was cancelled November 2019 per PCP request   Call PCP and ask Dr Odilia Bruno if patient can be rescheduled  Thanks

## 2020-03-19 NOTE — TELEPHONE ENCOUNTER
Spoke with Quincy Chong ( nurse ) from PCPs office , I will be faxing Medical Clearance form to be filled out to [phone]  Advised I am holding date of 4/9/20 at [facility]  I left a message for the PT to pls call in regards to confirming this date would work for her

## 2020-03-19 NOTE — TELEPHONE ENCOUNTER
----- Message from Pablo Hardin DO sent at 3/19/2020 10:17 AM EDT -----  Please call patient and ask when her surgery for her bladder tumor is scheduled and if she had her cardiac catheterization

## 2020-03-20 PROBLEM — Z78.9 HEPATITIS C ANTIBODY TEST NEGATIVE: Status: ACTIVE | Noted: 2020-03-20

## 2020-03-23 NOTE — TELEPHONE ENCOUNTER
I have spoken with patient and we have confirmed for 4/9/20 at [address] w DV for TURBT  She is aware to stop any blood thinners, aspirin containing products and multivitamins 7 days prior to procedure  Advised her PAT orders have been placed and to please go over to our [facility] to have all testing done  I have mailed her surgical packet and is aware to contact me with any questions or concerns  Please assist in scheduling patient for post op  She does live in [facility] but due to COVID-19 not sure if office hours will be reopened at this location at that time so she may have to travel to [facility] to be seen in [place] for results  Thank you

## 2020-03-24 ENCOUNTER — OFFICE VISIT (OUTPATIENT)
Dept: LAB | Facility: HOSPITAL | Age: 75
End: 2020-03-24
Attending: UROLOGY
Payer: COMMERCIAL

## 2020-03-24 ENCOUNTER — APPOINTMENT (OUTPATIENT)
Dept: LAB | Facility: HOSPITAL | Age: 75
End: 2020-03-24
Attending: UROLOGY
Payer: COMMERCIAL

## 2020-03-24 DIAGNOSIS — R31.0 GROSS HEMATURIA: ICD-10-CM

## 2020-03-24 LAB
ABO GROUP BLD: NORMAL
ANION GAP SERPL CALCULATED.3IONS-SCNC: 10 MMOL/L (ref 4–13)
APTT PPP: 34 SECONDS (ref 23–37)
ATRIAL RATE: 63 BPM
BASOPHILS # BLD AUTO: 0.06 THOUSANDS/ΜL (ref 0–0.1)
BASOPHILS NFR BLD AUTO: 1 % (ref 0–1)
BLD GP AB SCN SERPL QL: NEGATIVE
BUN SERPL-MCNC: 30 MG/DL (ref 5–25)
CALCIUM SERPL-MCNC: 9.7 MG/DL (ref 8.3–10.1)
CHLORIDE SERPL-SCNC: 106 MMOL/L (ref 100–108)
CO2 SERPL-SCNC: 27 MMOL/L (ref 21–32)
CREAT SERPL-MCNC: 1.71 MG/DL (ref 0.6–1.3)
EOSINOPHIL # BLD AUTO: 0.34 THOUSAND/ΜL (ref 0–0.61)
EOSINOPHIL NFR BLD AUTO: 5 % (ref 0–6)
ERYTHROCYTE [DISTWIDTH] IN BLOOD BY AUTOMATED COUNT: 13.2 % (ref 11.6–15.1)
GFR SERPL CREATININE-BSD FRML MDRD: 29 ML/MIN/1.73SQ M
GLUCOSE P FAST SERPL-MCNC: 148 MG/DL (ref 65–99)
HCT VFR BLD AUTO: 44.7 % (ref 34.8–46.1)
HGB BLD-MCNC: 14.1 G/DL (ref 11.5–15.4)
IMM GRANULOCYTES # BLD AUTO: 0.07 THOUSAND/UL (ref 0–0.2)
IMM GRANULOCYTES NFR BLD AUTO: 1 % (ref 0–2)
INR PPP: 0.96 (ref 0.84–1.19)
LYMPHOCYTES # BLD AUTO: 1.48 THOUSANDS/ΜL (ref 0.6–4.47)
LYMPHOCYTES NFR BLD AUTO: 21 % (ref 14–44)
MCH RBC QN AUTO: 28.5 PG (ref 26.8–34.3)
MCHC RBC AUTO-ENTMCNC: 31.5 G/DL (ref 31.4–37.4)
MCV RBC AUTO: 91 FL (ref 82–98)
MONOCYTES # BLD AUTO: 0.58 THOUSAND/ΜL (ref 0.17–1.22)
MONOCYTES NFR BLD AUTO: 8 % (ref 4–12)
NEUTROPHILS # BLD AUTO: 4.58 THOUSANDS/ΜL (ref 1.85–7.62)
NEUTS SEG NFR BLD AUTO: 64 % (ref 43–75)
NRBC BLD AUTO-RTO: 0 /100 WBCS
P AXIS: 53 DEGREES
PLATELET # BLD AUTO: 210 THOUSANDS/UL (ref 149–390)
PMV BLD AUTO: 10.4 FL (ref 8.9–12.7)
POTASSIUM SERPL-SCNC: 4.6 MMOL/L (ref 3.5–5.3)
PR INTERVAL: 170 MS
PROTHROMBIN TIME: 12.7 SECONDS (ref 11.6–14.5)
QRS AXIS: -10 DEGREES
QRSD INTERVAL: 80 MS
QT INTERVAL: 422 MS
QTC INTERVAL: 431 MS
RBC # BLD AUTO: 4.94 MILLION/UL (ref 3.81–5.12)
RH BLD: NEGATIVE
SODIUM SERPL-SCNC: 143 MMOL/L (ref 136–145)
SPECIMEN EXPIRATION DATE: NORMAL
T WAVE AXIS: 108 DEGREES
VENTRICULAR RATE: 63 BPM
WBC # BLD AUTO: 7.11 THOUSAND/UL (ref 4.31–10.16)

## 2020-03-24 PROCEDURE — 87086 URINE CULTURE/COLONY COUNT: CPT

## 2020-03-24 PROCEDURE — 93010 ELECTROCARDIOGRAM REPORT: CPT | Performed by: INTERNAL MEDICINE

## 2020-03-24 PROCEDURE — 85025 COMPLETE CBC W/AUTO DIFF WBC: CPT

## 2020-03-24 PROCEDURE — 86901 BLOOD TYPING SEROLOGIC RH(D): CPT

## 2020-03-24 PROCEDURE — 85610 PROTHROMBIN TIME: CPT

## 2020-03-24 PROCEDURE — 86850 RBC ANTIBODY SCREEN: CPT

## 2020-03-24 PROCEDURE — 93005 ELECTROCARDIOGRAM TRACING: CPT

## 2020-03-24 PROCEDURE — 86900 BLOOD TYPING SEROLOGIC ABO: CPT

## 2020-03-24 PROCEDURE — 87186 SC STD MICRODIL/AGAR DIL: CPT

## 2020-03-24 PROCEDURE — 80048 BASIC METABOLIC PNL TOTAL CA: CPT

## 2020-03-24 PROCEDURE — 87077 CULTURE AEROBIC IDENTIFY: CPT

## 2020-03-24 PROCEDURE — 36415 COLL VENOUS BLD VENIPUNCTURE: CPT

## 2020-03-24 PROCEDURE — 85730 THROMBOPLASTIN TIME PARTIAL: CPT

## 2020-03-24 NOTE — TELEPHONE ENCOUNTER
Patient's post op TURBT appointment scheduled for 4/24/20 at 1130am in the [facility] office with Dr Dieter Tyler  Please call and confirm with patient closer to surgery date

## 2020-03-25 ENCOUNTER — TELEPHONE (OUTPATIENT)
Dept: UROLOGY | Facility: CLINIC | Age: 75
End: 2020-03-25

## 2020-03-25 ENCOUNTER — ANESTHESIA EVENT (OUTPATIENT)
Dept: PERIOP | Facility: HOSPITAL | Age: 75
End: 2020-03-25
Payer: COMMERCIAL

## 2020-03-25 NOTE — TELEPHONE ENCOUNTER
Called and spoke with [employer]  She reports as order for sensitivities was generated this morning the results will be available tomorrow morning

## 2020-03-25 NOTE — TELEPHONE ENCOUNTER
Patient with insignificant bacteriuria, however given the plan for disruption of the mucosa of the bladder, we will have the microbiology lab provide identification and sensitivities for bacteriuria

## 2020-03-26 ENCOUNTER — TELEPHONE (OUTPATIENT)
Dept: UROLOGY | Facility: CLINIC | Age: 75
End: 2020-03-26

## 2020-03-26 DIAGNOSIS — R82.71 BACTERIURIA: Primary | ICD-10-CM

## 2020-03-26 LAB — BACTERIA UR CULT: ABNORMAL

## 2020-03-26 RX ORDER — CIPROFLOXACIN 250 MG/1
250 TABLET, FILM COATED ORAL EVERY 12 HOURS SCHEDULED
Qty: 10 TABLET | Refills: 0 | Status: SHIPPED | OUTPATIENT
Start: 2020-03-26 | End: 2020-03-31

## 2020-03-26 NOTE — TELEPHONE ENCOUNTER
Called and spoke to patient and informed her that Cipro was sent to her preferred pharmacy and per Dr Kenan Biggs she is instructed to take this five days prior to her surgery  Patient verbalized understanding

## 2020-03-30 ENCOUNTER — TELEPHONE (OUTPATIENT)
Dept: UROLOGY | Facility: AMBULATORY SURGERY CENTER | Age: 75
End: 2020-03-30

## 2020-03-30 DIAGNOSIS — Z91.89 AT RISK FOR OBSTRUCTIVE SLEEP APNEA: Primary | ICD-10-CM

## 2020-03-30 NOTE — TELEPHONE ENCOUNTER
Called and spoke with patient at this time  Informed her this is the only antibiotic she can take based on her kidney function  Patient amenable to taking cipro if it is necessary  Patient also reports her surgery date was changed to 4/6/20 at the [facility]  Advised she should then take medication starting tomorrow so it is 5 days prior to her surgery  Patient verbalized understanding

## 2020-03-30 NOTE — TELEPHONE ENCOUNTER
Spoke w PT and due to high rise of COVID in [facility] area we will now be performing surgery on 4/6/20 at [facility] w DV  Post op date will remain the same

## 2020-03-30 NOTE — TELEPHONE ENCOUNTER
Patient managed by Awais Andrews is calling to say she is high risk and was given antibiotic Cipro  She has surgery coming up on 4/09/20  Would like a call back [phone]    Called and spoke with patient at this time  She reports she does not want to take cipro related to side effects listed on container, most specifically related to age and rheumatoid arthritis  Patient requesting different antibiotic to be sent to her preferred pharmacy to treat bacteruria  Please review urine results and send different antibiotic

## 2020-04-01 ENCOUNTER — OFFICE VISIT (OUTPATIENT)
Dept: INTERNAL MEDICINE CLINIC | Facility: CLINIC | Age: 75
End: 2020-04-01
Payer: COMMERCIAL

## 2020-04-01 VITALS
BODY MASS INDEX: 35.26 KG/M2 | HEIGHT: 58 IN | TEMPERATURE: 97.5 F | DIASTOLIC BLOOD PRESSURE: 75 MMHG | WEIGHT: 168 LBS | OXYGEN SATURATION: 98 % | HEART RATE: 74 BPM | SYSTOLIC BLOOD PRESSURE: 135 MMHG

## 2020-04-01 DIAGNOSIS — I70.201 FEMORAL ARTERY STENOSIS, RIGHT (HCC): ICD-10-CM

## 2020-04-01 DIAGNOSIS — D49.4 BLADDER TUMOR: ICD-10-CM

## 2020-04-01 DIAGNOSIS — I77.1 CELIAC ARTERY STENOSIS (HCC): ICD-10-CM

## 2020-04-01 DIAGNOSIS — E78.5 HYPERLIPIDEMIA, UNSPECIFIED HYPERLIPIDEMIA TYPE: ICD-10-CM

## 2020-04-01 DIAGNOSIS — R91.1 PULMONARY NODULE: ICD-10-CM

## 2020-04-01 DIAGNOSIS — I25.10 STENOSIS OF LEFT ANTERIOR DESCENDING ARTERY: Primary | ICD-10-CM

## 2020-04-01 DIAGNOSIS — I10 HYPERTENSION, UNSPECIFIED TYPE: ICD-10-CM

## 2020-04-01 DIAGNOSIS — I24.0 RIGHT CORONARY ARTERY OCCLUSION (HCC): ICD-10-CM

## 2020-04-01 PROCEDURE — 3078F DIAST BP <80 MM HG: CPT | Performed by: INTERNAL MEDICINE

## 2020-04-01 PROCEDURE — 4040F PNEUMOC VAC/ADMIN/RCVD: CPT | Performed by: INTERNAL MEDICINE

## 2020-04-01 PROCEDURE — 99214 OFFICE O/P EST MOD 30 MIN: CPT | Performed by: INTERNAL MEDICINE

## 2020-04-01 PROCEDURE — 3008F BODY MASS INDEX DOCD: CPT | Performed by: INTERNAL MEDICINE

## 2020-04-01 PROCEDURE — 3066F NEPHROPATHY DOC TX: CPT | Performed by: INTERNAL MEDICINE

## 2020-04-01 PROCEDURE — 1160F RVW MEDS BY RX/DR IN RCRD: CPT | Performed by: INTERNAL MEDICINE

## 2020-04-01 PROCEDURE — 3075F SYST BP GE 130 - 139MM HG: CPT | Performed by: INTERNAL MEDICINE

## 2020-04-01 RX ORDER — MEDICAL SUPPLY, MISCELLANEOUS
EACH MISCELLANEOUS DAILY
Qty: 1 EACH | Refills: 1 | Status: SHIPPED | OUTPATIENT
Start: 2020-04-01 | End: 2020-05-12 | Stop reason: ALTCHOICE

## 2020-04-02 DIAGNOSIS — M10.272 ACUTE DRUG-INDUCED GOUT OF LEFT FOOT: ICD-10-CM

## 2020-04-02 RX ORDER — LABETALOL 100 MG/1
TABLET, FILM COATED ORAL
Qty: 180 TABLET | Refills: 0 | Status: SHIPPED | OUTPATIENT
Start: 2020-04-02 | End: 2020-04-15

## 2020-04-03 ENCOUNTER — TELEPHONE (OUTPATIENT)
Dept: CARDIOLOGY CLINIC | Facility: CLINIC | Age: 75
End: 2020-04-03

## 2020-04-06 ENCOUNTER — APPOINTMENT (OUTPATIENT)
Dept: RADIOLOGY | Facility: HOSPITAL | Age: 75
End: 2020-04-06
Payer: COMMERCIAL

## 2020-04-06 ENCOUNTER — HOSPITAL ENCOUNTER (OUTPATIENT)
Facility: HOSPITAL | Age: 75
Setting detail: OUTPATIENT SURGERY
Discharge: HOME | End: 2020-04-06
Attending: UROLOGY | Admitting: UROLOGY
Payer: COMMERCIAL

## 2020-04-06 ENCOUNTER — TELEPHONE (OUTPATIENT)
Dept: UROLOGY | Facility: CLINIC | Age: 75
End: 2020-04-06

## 2020-04-06 VITALS
BODY MASS INDEX: 35.26 KG/M2 | RESPIRATION RATE: 18 BRPM | DIASTOLIC BLOOD PRESSURE: 82 MMHG | WEIGHT: 168 LBS | OXYGEN SATURATION: 95 % | SYSTOLIC BLOOD PRESSURE: 191 MMHG | HEIGHT: 58 IN | HEART RATE: 64 BPM | TEMPERATURE: 97 F

## 2020-04-06 DIAGNOSIS — D49.4 BLADDER TUMOR: Primary | ICD-10-CM

## 2020-04-06 PROCEDURE — NC001 PR NO CHARGE: Performed by: UROLOGY

## 2020-04-06 PROCEDURE — 74420 UROGRAPHY RTRGR +-KUB: CPT

## 2020-04-06 PROCEDURE — 88307 TISSUE EXAM BY PATHOLOGIST: CPT | Performed by: PATHOLOGY

## 2020-04-06 PROCEDURE — 52332 CYSTOSCOPY AND TREATMENT: CPT | Performed by: UROLOGY

## 2020-04-06 PROCEDURE — C2617 STENT, NON-COR, TEM W/O DEL: HCPCS | Performed by: UROLOGY

## 2020-04-06 PROCEDURE — C1769 GUIDE WIRE: HCPCS | Performed by: UROLOGY

## 2020-04-06 PROCEDURE — 52235 CYSTOSCOPY AND TREATMENT: CPT | Performed by: UROLOGY

## 2020-04-06 DEVICE — STENT URETERAL 6FR 22CM INLAY OPTIMA W/NITINOL GDWR: Type: IMPLANTABLE DEVICE | Site: URETER | Status: FUNCTIONAL

## 2020-04-06 RX ORDER — ACETAMINOPHEN 325 MG/1
975 TABLET ORAL ONCE
Status: COMPLETED | OUTPATIENT
Start: 2020-04-06 | End: 2020-04-06

## 2020-04-06 RX ORDER — CEFAZOLIN SODIUM 1 G/50ML
1000 SOLUTION INTRAVENOUS ONCE
Status: COMPLETED | OUTPATIENT
Start: 2020-04-06 | End: 2020-04-06

## 2020-04-06 RX ORDER — FENTANYL CITRATE/PF 50 MCG/ML
50 SYRINGE (ML) INJECTION
Status: DISCONTINUED | OUTPATIENT
Start: 2020-04-06 | End: 2020-04-06 | Stop reason: HOSPADM

## 2020-04-06 RX ORDER — MIDAZOLAM HYDROCHLORIDE 2 MG/2ML
INJECTION, SOLUTION INTRAMUSCULAR; INTRAVENOUS AS NEEDED
Status: DISCONTINUED | OUTPATIENT
Start: 2020-04-06 | End: 2020-04-06 | Stop reason: SURG

## 2020-04-06 RX ORDER — DEXAMETHASONE SODIUM PHOSPHATE 4 MG/ML
INJECTION, SOLUTION INTRA-ARTICULAR; INTRALESIONAL; INTRAMUSCULAR; INTRAVENOUS; SOFT TISSUE AS NEEDED
Status: DISCONTINUED | OUTPATIENT
Start: 2020-04-06 | End: 2020-04-06 | Stop reason: SURG

## 2020-04-06 RX ORDER — DOCUSATE SODIUM 100 MG/1
100 CAPSULE, LIQUID FILLED ORAL 3 TIMES DAILY
Qty: 21 CAPSULE | Refills: 0 | Status: SHIPPED | OUTPATIENT
Start: 2020-04-06 | End: 2020-04-24

## 2020-04-06 RX ORDER — ONDANSETRON 2 MG/ML
4 INJECTION INTRAMUSCULAR; INTRAVENOUS ONCE AS NEEDED
Status: DISCONTINUED | OUTPATIENT
Start: 2020-04-06 | End: 2020-04-06 | Stop reason: HOSPADM

## 2020-04-06 RX ORDER — CEPHALEXIN 500 MG/1
500 CAPSULE ORAL EVERY 6 HOURS SCHEDULED
Qty: 12 CAPSULE | Refills: 0 | Status: SHIPPED | OUTPATIENT
Start: 2020-04-06 | End: 2020-04-10

## 2020-04-06 RX ORDER — SUCCINYLCHOLINE/SOD CL,ISO/PF 100 MG/5ML
SYRINGE (ML) INTRAVENOUS AS NEEDED
Status: DISCONTINUED | OUTPATIENT
Start: 2020-04-06 | End: 2020-04-06 | Stop reason: SURG

## 2020-04-06 RX ORDER — OXYCODONE HYDROCHLORIDE AND ACETAMINOPHEN 5; 325 MG/1; MG/1
2 TABLET ORAL EVERY 6 HOURS PRN
Status: DISCONTINUED | OUTPATIENT
Start: 2020-04-06 | End: 2020-04-06 | Stop reason: HOSPADM

## 2020-04-06 RX ORDER — ONDANSETRON 2 MG/ML
INJECTION INTRAMUSCULAR; INTRAVENOUS AS NEEDED
Status: DISCONTINUED | OUTPATIENT
Start: 2020-04-06 | End: 2020-04-06 | Stop reason: SURG

## 2020-04-06 RX ORDER — OXYCODONE HYDROCHLORIDE AND ACETAMINOPHEN 5; 325 MG/1; MG/1
1 TABLET ORAL EVERY 6 HOURS PRN
Qty: 8 TABLET | Refills: 0 | Status: SHIPPED | OUTPATIENT
Start: 2020-04-06 | End: 2020-04-11

## 2020-04-06 RX ORDER — ONDANSETRON 2 MG/ML
4 INJECTION INTRAMUSCULAR; INTRAVENOUS EVERY 6 HOURS PRN
Status: DISCONTINUED | OUTPATIENT
Start: 2020-04-06 | End: 2020-04-06 | Stop reason: HOSPADM

## 2020-04-06 RX ORDER — ACETAMINOPHEN 325 MG/1
650 TABLET ORAL EVERY 6 HOURS PRN
Status: DISCONTINUED | OUTPATIENT
Start: 2020-04-06 | End: 2020-04-06 | Stop reason: HOSPADM

## 2020-04-06 RX ORDER — PROPOFOL 10 MG/ML
INJECTION, EMULSION INTRAVENOUS AS NEEDED
Status: DISCONTINUED | OUTPATIENT
Start: 2020-04-06 | End: 2020-04-06 | Stop reason: SURG

## 2020-04-06 RX ORDER — GABAPENTIN 300 MG/1
300 CAPSULE ORAL ONCE
Status: COMPLETED | OUTPATIENT
Start: 2020-04-06 | End: 2020-04-06

## 2020-04-06 RX ORDER — FENTANYL CITRATE 50 UG/ML
INJECTION, SOLUTION INTRAMUSCULAR; INTRAVENOUS AS NEEDED
Status: DISCONTINUED | OUTPATIENT
Start: 2020-04-06 | End: 2020-04-06 | Stop reason: SURG

## 2020-04-06 RX ORDER — ROCURONIUM BROMIDE 10 MG/ML
INJECTION, SOLUTION INTRAVENOUS AS NEEDED
Status: DISCONTINUED | OUTPATIENT
Start: 2020-04-06 | End: 2020-04-06 | Stop reason: SURG

## 2020-04-06 RX ORDER — SODIUM CHLORIDE, SODIUM LACTATE, POTASSIUM CHLORIDE, CALCIUM CHLORIDE 600; 310; 30; 20 MG/100ML; MG/100ML; MG/100ML; MG/100ML
50 INJECTION, SOLUTION INTRAVENOUS CONTINUOUS
Status: DISCONTINUED | OUTPATIENT
Start: 2020-04-06 | End: 2020-04-06 | Stop reason: HOSPADM

## 2020-04-06 RX ORDER — DIPHENHYDRAMINE HCL 25 MG
25 TABLET ORAL EVERY 6 HOURS PRN
Status: DISCONTINUED | OUTPATIENT
Start: 2020-04-06 | End: 2020-04-06 | Stop reason: HOSPADM

## 2020-04-06 RX ORDER — LIDOCAINE HYDROCHLORIDE 10 MG/ML
INJECTION, SOLUTION EPIDURAL; INFILTRATION; INTRACAUDAL; PERINEURAL AS NEEDED
Status: DISCONTINUED | OUTPATIENT
Start: 2020-04-06 | End: 2020-04-06 | Stop reason: SURG

## 2020-04-06 RX ORDER — MAGNESIUM HYDROXIDE 1200 MG/15ML
LIQUID ORAL AS NEEDED
Status: DISCONTINUED | OUTPATIENT
Start: 2020-04-06 | End: 2020-04-06 | Stop reason: HOSPADM

## 2020-04-06 RX ADMIN — FENTANYL CITRATE 25 MCG: 50 INJECTION INTRAMUSCULAR; INTRAVENOUS at 08:53

## 2020-04-06 RX ADMIN — DEXAMETHASONE SODIUM PHOSPHATE 4 MG: 4 INJECTION, SOLUTION INTRAMUSCULAR; INTRAVENOUS at 08:48

## 2020-04-06 RX ADMIN — CEFAZOLIN SODIUM 1000 MG: 1 SOLUTION INTRAVENOUS at 08:25

## 2020-04-06 RX ADMIN — ONDANSETRON 4 MG: 2 INJECTION INTRAMUSCULAR; INTRAVENOUS at 08:25

## 2020-04-06 RX ADMIN — ACETAMINOPHEN 975 MG: 325 TABLET, FILM COATED ORAL at 07:38

## 2020-04-06 RX ADMIN — Medication 100 MG: at 08:30

## 2020-04-06 RX ADMIN — FENTANYL CITRATE 25 MCG: 50 INJECTION INTRAMUSCULAR; INTRAVENOUS at 08:30

## 2020-04-06 RX ADMIN — GABAPENTIN 300 MG: 300 CAPSULE ORAL at 07:38

## 2020-04-06 RX ADMIN — SODIUM CHLORIDE, SODIUM LACTATE, POTASSIUM CHLORIDE, AND CALCIUM CHLORIDE: .6; .31; .03; .02 INJECTION, SOLUTION INTRAVENOUS at 08:25

## 2020-04-06 RX ADMIN — MIDAZOLAM HYDROCHLORIDE 1 MG: 1 INJECTION, SOLUTION INTRAMUSCULAR; INTRAVENOUS at 08:25

## 2020-04-06 RX ADMIN — ROCURONIUM BROMIDE 5 MG: 10 SOLUTION INTRAVENOUS at 08:30

## 2020-04-06 RX ADMIN — PROPOFOL 150 MG: 10 INJECTION, EMULSION INTRAVENOUS at 08:30

## 2020-04-06 RX ADMIN — LIDOCAINE HYDROCHLORIDE 50 MG: 10 INJECTION, SOLUTION EPIDURAL; INFILTRATION; INTRACAUDAL; PERINEURAL at 08:30

## 2020-04-07 ENCOUNTER — TELEPHONE (OUTPATIENT)
Dept: INTERNAL MEDICINE CLINIC | Facility: CLINIC | Age: 75
End: 2020-04-07

## 2020-04-07 DIAGNOSIS — I73.9 CLAUDICATION IN PERIPHERAL VASCULAR DISEASE (HCC): ICD-10-CM

## 2020-04-07 DIAGNOSIS — E11.21 TYPE 2 DIABETES MELLITUS WITH DIABETIC NEPHROPATHY, WITHOUT LONG-TERM CURRENT USE OF INSULIN (HCC): ICD-10-CM

## 2020-04-07 DIAGNOSIS — I63.9 CEREBROVASCULAR ACCIDENT (CVA), UNSPECIFIED MECHANISM (HCC): ICD-10-CM

## 2020-04-07 DIAGNOSIS — E11.59 HYPERTENSION ASSOCIATED WITH DIABETES (HCC): Primary | ICD-10-CM

## 2020-04-07 DIAGNOSIS — I15.2 HYPERTENSION ASSOCIATED WITH DIABETES (HCC): Primary | ICD-10-CM

## 2020-04-07 DIAGNOSIS — I25.10 STENOSIS OF LEFT ANTERIOR DESCENDING ARTERY: Primary | ICD-10-CM

## 2020-04-07 RX ORDER — BLOOD PRESSURE TEST KIT-MEDIUM
KIT MISCELLANEOUS DAILY
Qty: 1 EACH | Refills: 1 | Status: SHIPPED | OUTPATIENT
Start: 2020-04-07 | End: 2020-05-12 | Stop reason: SDUPTHER

## 2020-04-09 ENCOUNTER — ANESTHESIA (OUTPATIENT)
Dept: PERIOP | Facility: HOSPITAL | Age: 75
End: 2020-04-09
Payer: COMMERCIAL

## 2020-04-10 ENCOUNTER — PROCEDURE VISIT (OUTPATIENT)
Dept: UROLOGY | Facility: CLINIC | Age: 75
End: 2020-04-10
Payer: COMMERCIAL

## 2020-04-10 VITALS — BODY MASS INDEX: 35.11 KG/M2 | HEIGHT: 58 IN | SYSTOLIC BLOOD PRESSURE: 162 MMHG | DIASTOLIC BLOOD PRESSURE: 66 MMHG

## 2020-04-10 DIAGNOSIS — D49.4 BLADDER TUMOR: Primary | ICD-10-CM

## 2020-04-10 PROCEDURE — 3078F DIAST BP <80 MM HG: CPT

## 2020-04-10 PROCEDURE — 99211 OFF/OP EST MAY X REQ PHY/QHP: CPT

## 2020-04-10 PROCEDURE — 1160F RVW MEDS BY RX/DR IN RCRD: CPT

## 2020-04-10 PROCEDURE — 3077F SYST BP >= 140 MM HG: CPT

## 2020-04-10 PROCEDURE — 4040F PNEUMOC VAC/ADMIN/RCVD: CPT

## 2020-04-10 PROCEDURE — 3066F NEPHROPATHY DOC TX: CPT

## 2020-04-15 ENCOUNTER — TELEPHONE (OUTPATIENT)
Dept: CARDIOLOGY CLINIC | Facility: CLINIC | Age: 75
End: 2020-04-15

## 2020-04-15 ENCOUNTER — TELEMEDICINE (OUTPATIENT)
Dept: INTERNAL MEDICINE CLINIC | Facility: CLINIC | Age: 75
End: 2020-04-15
Payer: COMMERCIAL

## 2020-04-15 DIAGNOSIS — I70.201 FEMORAL ARTERY STENOSIS, RIGHT (HCC): ICD-10-CM

## 2020-04-15 DIAGNOSIS — I25.10 STENOSIS OF LEFT ANTERIOR DESCENDING ARTERY: ICD-10-CM

## 2020-04-15 DIAGNOSIS — I70.0 AORTOILIAC STENOSIS, LEFT (HCC): ICD-10-CM

## 2020-04-15 DIAGNOSIS — E78.5 HYPERLIPIDEMIA, UNSPECIFIED HYPERLIPIDEMIA TYPE: ICD-10-CM

## 2020-04-15 DIAGNOSIS — E11.59 HYPERTENSION ASSOCIATED WITH DIABETES (HCC): Primary | ICD-10-CM

## 2020-04-15 DIAGNOSIS — M10.272 ACUTE DRUG-INDUCED GOUT OF LEFT FOOT: ICD-10-CM

## 2020-04-15 DIAGNOSIS — I70.1 ATHEROSCLEROSIS OF RENAL ARTERY (HCC): ICD-10-CM

## 2020-04-15 DIAGNOSIS — R94.39 POSITIVE CARDIAC STRESS TEST: ICD-10-CM

## 2020-04-15 DIAGNOSIS — R93.89 ABNORMAL CT OF THE CHEST: ICD-10-CM

## 2020-04-15 DIAGNOSIS — K55.1 MESENTERIC ARTERY STENOSIS (HCC): ICD-10-CM

## 2020-04-15 DIAGNOSIS — F17.200 TOBACCO USE DISORDER: ICD-10-CM

## 2020-04-15 DIAGNOSIS — I77.1 CELIAC ARTERY STENOSIS (HCC): ICD-10-CM

## 2020-04-15 DIAGNOSIS — R35.0 URINARY FREQUENCY: ICD-10-CM

## 2020-04-15 DIAGNOSIS — R91.1 PULMONARY NODULE: ICD-10-CM

## 2020-04-15 DIAGNOSIS — I65.23 ASYMPTOMATIC BILATERAL CAROTID ARTERY STENOSIS: ICD-10-CM

## 2020-04-15 DIAGNOSIS — E87.6 HYPOKALEMIA: ICD-10-CM

## 2020-04-15 DIAGNOSIS — I15.2 HYPERTENSION ASSOCIATED WITH DIABETES (HCC): Primary | ICD-10-CM

## 2020-04-15 DIAGNOSIS — I24.0 RIGHT CORONARY ARTERY OCCLUSION (HCC): ICD-10-CM

## 2020-04-15 PROBLEM — Z78.9 HEPATITIS C ANTIBODY TEST NEGATIVE: Status: RESOLVED | Noted: 2020-03-20 | Resolved: 2020-04-15

## 2020-04-15 PROCEDURE — G2012 BRIEF CHECK IN BY MD/QHP: HCPCS | Performed by: INTERNAL MEDICINE

## 2020-04-15 RX ORDER — CLONIDINE 0.2 MG/24H
1 PATCH, EXTENDED RELEASE TRANSDERMAL WEEKLY
Qty: 12 PATCH | Refills: 2 | Status: SHIPPED | OUTPATIENT
Start: 2020-04-15 | End: 2020-07-01

## 2020-04-24 ENCOUNTER — TELEMEDICINE (OUTPATIENT)
Dept: UROLOGY | Facility: CLINIC | Age: 75
End: 2020-04-24
Payer: COMMERCIAL

## 2020-04-24 DIAGNOSIS — C67.8 MALIGNANT NEOPLASM OF OVERLAPPING SITES OF BLADDER (HCC): Primary | ICD-10-CM

## 2020-04-24 PROBLEM — D49.4 BLADDER TUMOR: Status: RESOLVED | Noted: 2019-10-31 | Resolved: 2020-04-24

## 2020-04-24 PROBLEM — R31.0 GROSS HEMATURIA: Status: RESOLVED | Noted: 2019-10-22 | Resolved: 2020-04-24

## 2020-04-24 PROCEDURE — 1160F RVW MEDS BY RX/DR IN RCRD: CPT | Performed by: UROLOGY

## 2020-04-24 PROCEDURE — 3066F NEPHROPATHY DOC TX: CPT | Performed by: UROLOGY

## 2020-04-24 PROCEDURE — 3077F SYST BP >= 140 MM HG: CPT | Performed by: UROLOGY

## 2020-04-24 PROCEDURE — 3078F DIAST BP <80 MM HG: CPT | Performed by: UROLOGY

## 2020-04-24 PROCEDURE — 99442 PR PHYS/QHP TELEPHONE EVALUATION 11-20 MIN: CPT | Performed by: UROLOGY

## 2020-04-24 PROCEDURE — 4040F PNEUMOC VAC/ADMIN/RCVD: CPT | Performed by: UROLOGY

## 2020-05-01 ENCOUNTER — TELEPHONE (OUTPATIENT)
Dept: UROLOGY | Facility: CLINIC | Age: 75
End: 2020-05-01

## 2020-05-07 PROBLEM — Z46.6 ENCOUNTER FOR REMOVAL OF URETERAL STENT: Status: ACTIVE | Noted: 2020-05-07

## 2020-05-08 ENCOUNTER — PROCEDURE VISIT (OUTPATIENT)
Dept: UROLOGY | Facility: CLINIC | Age: 75
End: 2020-05-08
Payer: COMMERCIAL

## 2020-05-08 VITALS
WEIGHT: 175 LBS | BODY MASS INDEX: 36.73 KG/M2 | HEART RATE: 71 BPM | SYSTOLIC BLOOD PRESSURE: 158 MMHG | HEIGHT: 58 IN | DIASTOLIC BLOOD PRESSURE: 90 MMHG

## 2020-05-08 DIAGNOSIS — Z46.6 ENCOUNTER FOR REMOVAL OF URETERAL STENT: Primary | ICD-10-CM

## 2020-05-08 PROCEDURE — 52310 CYSTOSCOPY AND TREATMENT: CPT | Performed by: UROLOGY

## 2020-05-12 ENCOUNTER — OFFICE VISIT (OUTPATIENT)
Dept: INTERNAL MEDICINE CLINIC | Facility: CLINIC | Age: 75
End: 2020-05-12
Payer: COMMERCIAL

## 2020-05-12 VITALS
RESPIRATION RATE: 20 BRPM | OXYGEN SATURATION: 98 % | TEMPERATURE: 98 F | BODY MASS INDEX: 36.78 KG/M2 | WEIGHT: 175.2 LBS | DIASTOLIC BLOOD PRESSURE: 80 MMHG | HEART RATE: 75 BPM | HEIGHT: 58 IN | SYSTOLIC BLOOD PRESSURE: 170 MMHG

## 2020-05-12 DIAGNOSIS — I15.2 HYPERTENSION ASSOCIATED WITH DIABETES (HCC): Primary | ICD-10-CM

## 2020-05-12 DIAGNOSIS — M25.512 ACUTE PAIN OF LEFT SHOULDER: ICD-10-CM

## 2020-05-12 DIAGNOSIS — E11.21 TYPE 2 DIABETES MELLITUS WITH DIABETIC NEPHROPATHY, WITHOUT LONG-TERM CURRENT USE OF INSULIN (HCC): ICD-10-CM

## 2020-05-12 DIAGNOSIS — E11.59 HYPERTENSION ASSOCIATED WITH DIABETES (HCC): Primary | ICD-10-CM

## 2020-05-12 DIAGNOSIS — N18.30 STAGE 3 CHRONIC KIDNEY DISEASE (HCC): ICD-10-CM

## 2020-05-12 DIAGNOSIS — M54.12 CERVICAL RADICULOPATHY: ICD-10-CM

## 2020-05-12 PROCEDURE — 4010F ACE/ARB THERAPY RXD/TAKEN: CPT | Performed by: INTERNAL MEDICINE

## 2020-05-12 PROCEDURE — 1160F RVW MEDS BY RX/DR IN RCRD: CPT | Performed by: INTERNAL MEDICINE

## 2020-05-12 PROCEDURE — 3079F DIAST BP 80-89 MM HG: CPT | Performed by: INTERNAL MEDICINE

## 2020-05-12 PROCEDURE — 3008F BODY MASS INDEX DOCD: CPT | Performed by: INTERNAL MEDICINE

## 2020-05-12 PROCEDURE — 4040F PNEUMOC VAC/ADMIN/RCVD: CPT | Performed by: INTERNAL MEDICINE

## 2020-05-12 PROCEDURE — 99214 OFFICE O/P EST MOD 30 MIN: CPT | Performed by: INTERNAL MEDICINE

## 2020-05-12 PROCEDURE — 3077F SYST BP >= 140 MM HG: CPT | Performed by: INTERNAL MEDICINE

## 2020-05-12 PROCEDURE — 3066F NEPHROPATHY DOC TX: CPT | Performed by: INTERNAL MEDICINE

## 2020-05-12 RX ORDER — LOSARTAN POTASSIUM 50 MG/1
50 TABLET ORAL DAILY
Qty: 90 TABLET | Refills: 2 | Status: SHIPPED | OUTPATIENT
Start: 2020-05-12 | End: 2020-07-01 | Stop reason: DRUGHIGH

## 2020-05-12 RX ORDER — LOSARTAN POTASSIUM 100 MG/1
100 TABLET ORAL EVERY MORNING
Qty: 90 TABLET | Refills: 2 | Status: SHIPPED | OUTPATIENT
Start: 2020-05-12 | End: 2020-11-11

## 2020-05-12 RX ORDER — CLONIDINE 0.3 MG/24H
1 PATCH, EXTENDED RELEASE TRANSDERMAL WEEKLY
Qty: 12 PATCH | Refills: 2 | Status: SHIPPED | OUTPATIENT
Start: 2020-05-12 | End: 2021-01-20 | Stop reason: SDUPTHER

## 2020-05-13 DIAGNOSIS — I15.2 HYPERTENSION ASSOCIATED WITH DIABETES (HCC): ICD-10-CM

## 2020-05-13 DIAGNOSIS — E11.59 HYPERTENSION ASSOCIATED WITH DIABETES (HCC): ICD-10-CM

## 2020-05-14 ENCOUNTER — TELEPHONE (OUTPATIENT)
Dept: INTERNAL MEDICINE CLINIC | Facility: CLINIC | Age: 75
End: 2020-05-14

## 2020-05-14 ENCOUNTER — EVALUATION (OUTPATIENT)
Dept: PHYSICAL THERAPY | Facility: CLINIC | Age: 75
End: 2020-05-14
Payer: COMMERCIAL

## 2020-05-14 DIAGNOSIS — M25.511 ACUTE PAIN OF RIGHT SHOULDER: Primary | ICD-10-CM

## 2020-05-14 DIAGNOSIS — M54.2 CERVICAL PAIN (NECK): ICD-10-CM

## 2020-05-14 PROCEDURE — 97110 THERAPEUTIC EXERCISES: CPT

## 2020-05-18 ENCOUNTER — OFFICE VISIT (OUTPATIENT)
Dept: PHYSICAL THERAPY | Facility: CLINIC | Age: 75
End: 2020-05-18
Payer: COMMERCIAL

## 2020-05-18 DIAGNOSIS — M25.511 ACUTE PAIN OF RIGHT SHOULDER: Primary | ICD-10-CM

## 2020-05-18 DIAGNOSIS — M54.2 CERVICAL PAIN (NECK): ICD-10-CM

## 2020-05-18 PROCEDURE — 97110 THERAPEUTIC EXERCISES: CPT

## 2020-05-18 PROCEDURE — 97140 MANUAL THERAPY 1/> REGIONS: CPT

## 2020-05-19 PROCEDURE — 4010F ACE/ARB THERAPY RXD/TAKEN: CPT | Performed by: INTERNAL MEDICINE

## 2020-05-19 RX ORDER — TELMISARTAN 80 MG/1
TABLET ORAL
Refills: 0 | OUTPATIENT
Start: 2020-05-19

## 2020-05-21 ENCOUNTER — OFFICE VISIT (OUTPATIENT)
Dept: PHYSICAL THERAPY | Facility: CLINIC | Age: 75
End: 2020-05-21
Payer: COMMERCIAL

## 2020-05-21 DIAGNOSIS — M25.511 ACUTE PAIN OF RIGHT SHOULDER: Primary | ICD-10-CM

## 2020-05-21 DIAGNOSIS — M54.2 CERVICAL PAIN (NECK): ICD-10-CM

## 2020-05-21 PROCEDURE — 97110 THERAPEUTIC EXERCISES: CPT

## 2020-05-26 ENCOUNTER — OFFICE VISIT (OUTPATIENT)
Dept: PHYSICAL THERAPY | Facility: CLINIC | Age: 75
End: 2020-05-26
Payer: COMMERCIAL

## 2020-05-26 DIAGNOSIS — M54.2 CERVICAL PAIN (NECK): ICD-10-CM

## 2020-05-26 DIAGNOSIS — M25.511 ACUTE PAIN OF RIGHT SHOULDER: Primary | ICD-10-CM

## 2020-05-26 PROCEDURE — 97110 THERAPEUTIC EXERCISES: CPT

## 2020-05-26 PROCEDURE — 97112 NEUROMUSCULAR REEDUCATION: CPT

## 2020-05-28 ENCOUNTER — APPOINTMENT (OUTPATIENT)
Dept: PHYSICAL THERAPY | Facility: CLINIC | Age: 75
End: 2020-05-28
Payer: COMMERCIAL

## 2020-06-17 LAB
ALBUMIN SERPL-MCNC: 4.4 G/DL (ref 3.7–4.7)
ALBUMIN/CREAT UR: 488 MG/G CREAT (ref 0–29)
ALBUMIN/GLOB SERPL: 1.7 {RATIO} (ref 1.2–2.2)
ALP SERPL-CCNC: 87 IU/L
ALT SERPL-CCNC: 17 IU/L
AST SERPL-CCNC: 17 IU/L (ref 0–40)
BASOPHILS # BLD AUTO: 0.1 X10E3/UL
BASOPHILS NFR BLD AUTO: 1 %
BILIRUB SERPL-MCNC: 0.3 MG/DL (ref 0–1.2)
BUN SERPL-MCNC: 28 MG/DL
BUN/CREAT SERPL: 17
CALCIUM SERPL-MCNC: 9.6 MG/DL
CHLORIDE SERPL-SCNC: 101 MMOL/L (ref 96–106)
CHOLEST SERPL-MCNC: 299 MG/DL
CO2 SERPL-SCNC: 21 MMOL/L (ref 20–29)
CREAT SERPL-MCNC: 1.61 MG/DL
CREAT UR-MCNC: 97.1 MG/DL
CYTOLOGY CMNT CVX/VAG CYTO-IMP: ABNORMAL
EOSINOPHIL # BLD AUTO: 0.3 X10E3/UL
EOSINOPHIL NFR BLD AUTO: 4 %
ERYTHROCYTE [DISTWIDTH] IN BLOOD BY AUTOMATED COUNT: 13.2 %
GLOBULIN SER-MCNC: 2.6 G/DL (ref 1.5–4.5)
GLUCOSE SERPL-MCNC: 145 MG/DL (ref 65–99)
HCT VFR BLD AUTO: 39.6 %
HDLC SERPL-MCNC: 42 MG/DL
HGB BLD-MCNC: 13.8 G/DL
IMM GRANULOCYTES # BLD: 0.1 X10E3/UL
IMM GRANULOCYTES NFR BLD: 1 %
LDLC SERPL CALC-MCNC: 201 MG/DL
LDLC SERPL DIRECT ASSAY-MCNC: 198 MG/DL
LYMPHOCYTES # BLD AUTO: 1.8 X10E3/UL
LYMPHOCYTES NFR BLD AUTO: 26 %
MAGNESIUM SERPL-MCNC: 2 MG/DL (ref 1.6–2.3)
MCH RBC QN AUTO: 30 PG
MCHC RBC AUTO-ENTMCNC: 34.8 G/DL
MCV RBC AUTO: 86 FL
MICROALBUMIN UR-MCNC: 473.4 UG/ML
MONOCYTES # BLD AUTO: 0.6 X10E3/UL
MONOCYTES NFR BLD AUTO: 9 %
NEUTROPHILS # BLD AUTO: 4.1 X10E3/UL
NEUTROPHILS NFR BLD AUTO: 59 %
PLATELET # BLD AUTO: 224 X10E3/UL (ref 150–450)
POTASSIUM SERPL-SCNC: 4.3 MMOL/L (ref 3.5–5.2)
PROT SERPL-MCNC: 7 G/DL (ref 6–8.5)
RBC # BLD AUTO: 4.6 X10E6/UL
SL AMB EGFR AFRICAN AMERICAN: ABNORMAL ML/MIN/1.73
SL AMB EGFR NON AFRICAN AMERICAN: ABNORMAL ML/MIN/1.73
SL AMB VLDL CHOLESTEROL CALC: 56 MG/DL (ref 5–40)
SODIUM SERPL-SCNC: 140 MMOL/L (ref 134–144)
TRIGL SERPL-MCNC: 281 MG/DL
URATE SERPL-MCNC: 8.1 MG/DL
WBC # BLD AUTO: 7 X10E3/UL (ref 3.4–10.8)

## 2020-06-19 LAB
ALBUMIN/CREAT UR: 517 MG/G CREAT (ref 0–29)
APPEARANCE UR: ABNORMAL
BACTERIA URNS QL MICRO: ABNORMAL
BASOPHILS # BLD AUTO: 0.1 X10E3/UL (ref 0–0.2)
BASOPHILS NFR BLD AUTO: 1 %
BILIRUB UR QL STRIP: NEGATIVE
BUN SERPL-MCNC: 29 MG/DL (ref 8–27)
BUN/CREAT SERPL: 18 (ref 12–28)
CALCIUM SERPL-MCNC: 9.8 MG/DL (ref 8.7–10.3)
CHLORIDE SERPL-SCNC: 102 MMOL/L (ref 96–106)
CO2 SERPL-SCNC: 22 MMOL/L (ref 20–29)
COLOR UR: YELLOW
CREAT SERPL-MCNC: 1.58 MG/DL (ref 0.57–1)
CREAT UR-MCNC: 91.8 MG/DL
EOSINOPHIL # BLD AUTO: 0.3 X10E3/UL (ref 0–0.4)
EOSINOPHIL NFR BLD AUTO: 4 %
EPI CELLS #/AREA URNS HPF: ABNORMAL /HPF (ref 0–10)
ERYTHROCYTE [DISTWIDTH] IN BLOOD BY AUTOMATED COUNT: 13 % (ref 11.7–15.4)
GLUCOSE SERPL-MCNC: 146 MG/DL (ref 65–99)
GLUCOSE UR QL: NEGATIVE
HCT VFR BLD AUTO: 41.5 % (ref 34–46.6)
HGB BLD-MCNC: 13.8 G/DL (ref 11.1–15.9)
HGB UR QL STRIP: NEGATIVE
IMM GRANULOCYTES # BLD: 0.1 X10E3/UL (ref 0–0.1)
IMM GRANULOCYTES NFR BLD: 1 %
KETONES UR QL STRIP: NEGATIVE
LEUKOCYTE ESTERASE UR QL STRIP: ABNORMAL
LYMPHOCYTES # BLD AUTO: 1.8 X10E3/UL (ref 0.7–3.1)
LYMPHOCYTES NFR BLD AUTO: 25 %
MCH RBC QN AUTO: 28.9 PG (ref 26.6–33)
MCHC RBC AUTO-ENTMCNC: 33.3 G/DL (ref 31.5–35.7)
MCV RBC AUTO: 87 FL (ref 79–97)
MICRO URNS: ABNORMAL
MICROALBUMIN UR-MCNC: 474.3 UG/ML
MONOCYTES # BLD AUTO: 0.7 X10E3/UL (ref 0.1–0.9)
MONOCYTES NFR BLD AUTO: 10 %
NEUTROPHILS # BLD AUTO: 4.3 X10E3/UL (ref 1.4–7)
NEUTROPHILS NFR BLD AUTO: 59 %
NITRITE UR QL STRIP: POSITIVE
PH UR STRIP: 6 [PH] (ref 5–7.5)
PHOSPHATE SERPL-MCNC: 3.7 MG/DL (ref 3–4.3)
PLATELET # BLD AUTO: 247 X10E3/UL (ref 150–450)
POTASSIUM SERPL-SCNC: 4.7 MMOL/L (ref 3.5–5.2)
PROT UR QL STRIP: ABNORMAL
PTH-INTACT SERPL-MCNC: 86 PG/ML (ref 15–65)
RBC # BLD AUTO: 4.77 X10E6/UL (ref 3.77–5.28)
RBC #/AREA URNS HPF: ABNORMAL /HPF (ref 0–2)
SL AMB EGFR AFRICAN AMERICAN: 37 ML/MIN/1.73
SL AMB EGFR NON AFRICAN AMERICAN: 32 ML/MIN/1.73
SODIUM SERPL-SCNC: 140 MMOL/L (ref 134–144)
SP GR UR: 1.02 (ref 1–1.03)
URATE SERPL-MCNC: 8.1 MG/DL (ref 2.5–7.1)
UROBILINOGEN UR STRIP-ACNC: 0.2 MG/DL (ref 0.2–1)
WBC # BLD AUTO: 7.2 X10E3/UL (ref 3.4–10.8)
WBC #/AREA URNS HPF: ABNORMAL /HPF (ref 0–5)

## 2020-06-29 ENCOUNTER — TELEPHONE (OUTPATIENT)
Dept: NEPHROLOGY | Facility: CLINIC | Age: 75
End: 2020-06-29

## 2020-06-30 ENCOUNTER — TELEPHONE (OUTPATIENT)
Dept: NEPHROLOGY | Facility: CLINIC | Age: 75
End: 2020-06-30

## 2020-07-01 ENCOUNTER — HOSPITAL ENCOUNTER (OUTPATIENT)
Dept: ULTRASOUND IMAGING | Facility: HOSPITAL | Age: 75
Discharge: HOME | End: 2020-07-01
Attending: INTERNAL MEDICINE
Payer: COMMERCIAL

## 2020-07-01 ENCOUNTER — OFFICE VISIT (OUTPATIENT)
Dept: NEPHROLOGY | Facility: CLINIC | Age: 75
End: 2020-07-01
Payer: COMMERCIAL

## 2020-07-01 ENCOUNTER — OFFICE VISIT (OUTPATIENT)
Dept: INTERNAL MEDICINE CLINIC | Facility: CLINIC | Age: 75
End: 2020-07-01
Payer: COMMERCIAL

## 2020-07-01 ENCOUNTER — TELEPHONE (OUTPATIENT)
Dept: OTHER | Facility: OTHER | Age: 75
End: 2020-07-01

## 2020-07-01 ENCOUNTER — TELEPHONE (OUTPATIENT)
Dept: INTERNAL MEDICINE CLINIC | Facility: CLINIC | Age: 75
End: 2020-07-01

## 2020-07-01 VITALS
HEIGHT: 71 IN | SYSTOLIC BLOOD PRESSURE: 160 MMHG | RESPIRATION RATE: 18 BRPM | OXYGEN SATURATION: 98 % | WEIGHT: 179 LBS | HEART RATE: 80 BPM | TEMPERATURE: 97.8 F | DIASTOLIC BLOOD PRESSURE: 70 MMHG | BODY MASS INDEX: 25.06 KG/M2

## 2020-07-01 VITALS
SYSTOLIC BLOOD PRESSURE: 158 MMHG | RESPIRATION RATE: 16 BRPM | TEMPERATURE: 97.5 F | BODY MASS INDEX: 24.92 KG/M2 | HEIGHT: 71 IN | HEART RATE: 65 BPM | WEIGHT: 178 LBS | DIASTOLIC BLOOD PRESSURE: 88 MMHG

## 2020-07-01 DIAGNOSIS — M25.552 ACUTE HIP PAIN, LEFT: ICD-10-CM

## 2020-07-01 DIAGNOSIS — R60.0 EDEMA OF LEFT LOWER EXTREMITY: ICD-10-CM

## 2020-07-01 DIAGNOSIS — R80.8 OTHER PROTEINURIA: ICD-10-CM

## 2020-07-01 DIAGNOSIS — M79.605 PAIN OF LEFT LOWER EXTREMITY: ICD-10-CM

## 2020-07-01 DIAGNOSIS — I12.9 HYPERTENSIVE KIDNEY DISEASE WITH STAGE 3 CHRONIC KIDNEY DISEASE (HCC): ICD-10-CM

## 2020-07-01 DIAGNOSIS — M10.272 ACUTE DRUG-INDUCED GOUT OF LEFT FOOT: ICD-10-CM

## 2020-07-01 DIAGNOSIS — N18.30 STAGE 3 CHRONIC KIDNEY DISEASE (HCC): Primary | ICD-10-CM

## 2020-07-01 DIAGNOSIS — I83.892 VARICOSE VEINS OF LEFT LOWER EXTREMITY WITH EDEMA: ICD-10-CM

## 2020-07-01 DIAGNOSIS — N18.30 HYPERTENSIVE KIDNEY DISEASE WITH STAGE 3 CHRONIC KIDNEY DISEASE (HCC): ICD-10-CM

## 2020-07-01 DIAGNOSIS — I83.892 VARICOSE VEINS OF LEFT LOWER EXTREMITY WITH EDEMA: Primary | ICD-10-CM

## 2020-07-01 PROCEDURE — 3066F NEPHROPATHY DOC TX: CPT | Performed by: INTERNAL MEDICINE

## 2020-07-01 PROCEDURE — 3008F BODY MASS INDEX DOCD: CPT | Performed by: INTERNAL MEDICINE

## 2020-07-01 PROCEDURE — 3077F SYST BP >= 140 MM HG: CPT | Performed by: INTERNAL MEDICINE

## 2020-07-01 PROCEDURE — 3079F DIAST BP 80-89 MM HG: CPT | Performed by: INTERNAL MEDICINE

## 2020-07-01 PROCEDURE — 99214 OFFICE O/P EST MOD 30 MIN: CPT | Performed by: INTERNAL MEDICINE

## 2020-07-01 PROCEDURE — 4040F PNEUMOC VAC/ADMIN/RCVD: CPT | Performed by: INTERNAL MEDICINE

## 2020-07-01 PROCEDURE — 1160F RVW MEDS BY RX/DR IN RCRD: CPT | Performed by: INTERNAL MEDICINE

## 2020-07-01 PROCEDURE — 3078F DIAST BP <80 MM HG: CPT | Performed by: INTERNAL MEDICINE

## 2020-07-01 PROCEDURE — 93971 EXTREMITY STUDY: CPT | Performed by: SURGERY

## 2020-07-01 PROCEDURE — 93971 EXTREMITY STUDY: CPT

## 2020-07-01 RX ORDER — LABETALOL 300 MG/1
300 TABLET, FILM COATED ORAL 2 TIMES DAILY
Qty: 270 TABLET | Refills: 2 | Status: SHIPPED | OUTPATIENT
Start: 2020-07-01 | End: 2021-07-09

## 2020-07-01 NOTE — LETTER
July 1, 2020     Len Montana DO  [id number] [id number]    Patient: Ephraim Pacheco   YOB: 1945   Date of Visit: 7/1/2020       Dear Dr Elijah Fernandez: Thank you for referring Ashu Linn to me for evaluation  Below are my notes for this consultation  If you have questions, please do not hesitate to call me  I look forward to following your patient along with you  Sincerely,        Snehal Garcia MD        CC: No Recipients  Snehal Garcia MD  7/1/2020  9:17 AM  Sign at close encounter  [address] 70 y o  @[facility] @ YOB: 1945 MRN: [id number]    Encounter: [id number] DATE: 7/1/2020    REASON FOR VISIT: Ephraim Pacheco is a 70 y  o female returns to Nephrology office for further management of chronic kidney disease  HPI:    This is 30-year-old female with past medical history significant for hypertension, hyperlipidemia, spinal stenosis, peripheral vascular disease, bladder cancer, returns to Nephrology office for further management of CKD stage 3  Upon review of old medical records, baseline creatinine seems to be fluctuating around 1 5-1 6  Patient was also found to have bladder cancer and now been followed by urologist-Dr Terrell Louie and in the interim has underwent cystoscopy  Patient has underlying hypertension and had underwent renal artery Doppler on 1/7/2020 and also found to have > 60% narrowing of right main renal artery  Left side was difficult to evaluate  Patient said that she has been checking blood pressure at home which has been fluctuating  Patient is a ex-smoker  Patient also complains of left lower extremity swelling only for last few weeks  Patient also said that she also have trouble with her left hip  Patient is also currently been followed by neurologist for CVA-lacunar infarct  Patient takes all the medications as prescribed and denies use of NSAIDs        REVIEW OF SYSTEMS:    Review of Systems   Constitutional: Negative for chills and fever  HENT: Negative for nosebleeds and sore throat  Eyes: Negative for photophobia and pain  Respiratory: Negative for choking and wheezing  Cardiovascular: Positive for leg swelling  Negative for chest pain and palpitations  Gastrointestinal: Negative for abdominal pain and blood in stool  Endocrine: Negative for cold intolerance and heat intolerance  Genitourinary: Negative for flank pain and hematuria  Musculoskeletal: Negative for joint swelling and neck pain  Skin: Negative for color change and pallor  Allergic/Immunologic: Negative for environmental allergies and immunocompromised state  Neurological: Negative for seizures, syncope and speech difficulty  Hematological: Negative for adenopathy  Does not bruise/bleed easily  Psychiatric/Behavioral: Negative for confusion and suicidal ideas           PAST MEDICAL HISTORY:  Past Medical History:   Diagnosis Date    Arthritis     Chronic kidney disease     Coronary artery disease     Diabetes mellitus ([facility] [address] )     Patient states she is not Diabetic    Endometriosis     High cholesterol     Hypertension     Kidney disease, chronic, stage III (moderate, EGFR 30-59 ml/min) ([facility])     Lumbar disc herniation     Neuropathy     Peripheral vascular disease ([facility])     Pneumonia     Shoulder injury     left    Spinal stenosis     Stroke ([facility] [address] ) 2015    Memory loss       PAST SURGICAL HISTORY:  Past Surgical History:   Procedure Laterality Date    CARDIAC CATHETERIZATION      COLONOSCOPY      FL RETROGRADE PYELOGRAM  4/6/2020    FRACTURE SURGERY Right     ankle    OVARIAN CYST SURGERY      [place] CYSTOSCOPY,INSERT URETERAL STENT Right 4/6/2020    Procedure: INSERTION STENT URETERAL;  Surgeon: Candie Echeverria MD;  [facility];  Service: Urology    [place] CYSTOURETHROSCOPY,FULGUR 0 5-2 CM AYSE N/A 4/6/2020    Procedure: TRANSURETHRAL RESECTION OF BLADDER TUMOR (TURBT);   Surgeon: Flynn Fields MD;  [facility];  Service: Urology    CO CYSTOURETHROSCOPY,URETER CATHETER Bilateral 4/6/2020    Procedure: CYSTOSCOPY WITH RETROGRADE PYELOGRAM;  Surgeon: Flynn Fields MD;  [facility];  Service: Urology    ROTATOR CUFF REPAIR Left     TONSILLECTOMY         SOCIAL HISTORY:  Social History     Substance and Sexual Activity   Alcohol Use No     Social History     Substance and Sexual Activity   Drug Use No     Social History     Tobacco Use   Smoking Status Current Some Day Smoker    Packs/day: 0 00    Types: Cigarettes   Smokeless Tobacco Never Used   Tobacco Comment    3 cigarettes per week       FAMILY HISTORY:  Family History   Problem Relation Age of Onset    Hypertension Mother     Heart disease Mother         Valvular    Hyperlipidemia Mother     Hypertension Father     Heart defect Father         Cardiomegaly    Stroke Sister         Cerebrovascular Accident    Arthritis Brother     Other Brother         Back Disorder       ALLERGY:  Allergies   Allergen Reactions    Fenofibrate Other (See Comments)      blood in urine  hx  Kidney Failure    Atorvastatin Myalgia and Dizziness    Colesevelam Other (See Comments)      leg pains    Colestipol Itching and Other (See Comments)      Swelling lower legs    Ezetimibe GI Intolerance    Pollen Extract Allergic Rhinitis    Rosuvastatin Myalgia    Statins Myalgia       MEDICATIONS:    Current Outpatient Medications:     amLODIPine (NORVASC) 5 mg tablet, Take 1 tablet (5 mg total) by mouth 2 (two) times a day, Disp: 60 tablet, Rfl: 11    cloNIDine (CATAPRES-TTS-3) 0 3 mg/24 hr, Place 1 patch (0 3 mg total) on the skin once a week, Disp: 12 patch, Rfl: 2    clopidogrel (PLAVIX) 75 mg tablet, Take 1 tablet (75 mg total) by mouth daily, Disp: 90 tablet, Rfl: 1    Icosapent Ethyl (VASCEPA) 1 g CAPS, 2 cap bid with meals, Disp: 360 capsule, Rfl: 2    labetalol (NORMODYNE) 300 mg tablet, Take 1 tablet (300 mg total) by mouth 2 (two) times a day, Disp: 270 tablet, Rfl: 2    losartan (COZAAR) 100 MG tablet, Take 1 tablet (100 mg total) by mouth every morning, Disp: 90 tablet, Rfl: 2    cloNIDine (CATAPRES-TTS-2) 0 2 mg/24 hr, Place 1 patch (0 2 mg total) on the skin once a week (Patient not taking: Reported on 7/1/2020), Disp: 12 patch, Rfl: 2    nicotine (NICOTROL) 10 MG inhaler, Inhale 1 puff as needed for smoking cessation (Patient not taking: Reported on 7/1/2020), Disp: 42 each, Rfl: 2    PHYSICAL EXAM:  Vitals:    07/01/20 0857   BP: 158/88   BP Location: Left arm   Patient Position: Sitting   Cuff Size: Standard   Pulse: 65   Resp: 16   Temp: 97 5 °F (36 4 °C)   TempSrc: Tympanic   Weight: 80 7 kg (178 lb)   Height: 5' 11" (1 803 m)     Body mass index is 24 83 kg/m²  Physical Exam   Constitutional: She appears well-nourished  No distress  HENT:   Head: Normocephalic and atraumatic  Eyes: Conjunctivae are normal  No scleral icterus  Right eye exhibits normal extraocular motion  Left eye exhibits normal extraocular motion  Neck: Neck supple  No JVD present  Cardiovascular: Normal rate, S1 normal and S2 normal    Pulmonary/Chest: No accessory muscle usage  No respiratory distress  She has no wheezes  Abdominal: Soft  She exhibits no distension  Musculoskeletal:        Right ankle: She exhibits no swelling  Left ankle: She exhibits swelling  Right hand: She exhibits no laceration  Left hand: She exhibits no laceration  Lymphadenopathy:        Right cervical: No superficial cervical adenopathy present  Left cervical: No superficial cervical adenopathy present  Neurological: She is alert  She is not disoriented  Skin: Skin is warm  No cyanosis  Psychiatric: She is not combative  She does not exhibit a depressed mood  She is communicative         LAB RESULTS:  Results for orders placed or performed in visit on 06/17/20   CBC and differential   Result Value Ref Range    White Blood Cell Count 7 0 3 4 - 10 8 x10E3/uL    Red Blood Cell Count 4 60 x10E6/uL    Hemoglobin 13 8 g/dL    HCT 39 6 %    MCV 86 fL    MCH 30 0 pg    MCHC 34 8 g/dL    RDW 13 2 %    Platelet Count 272 427 - 450 x10E3/uL    Neutrophils 59 Not Estab  %    Lymphocytes 26 Not Estab  %    Monocytes 9 Not Estab  %    Eosinophils 4 Not Estab  %    Basophils PCT 1 Not Estab  %    Neutrophils (Absolute) 4 1 x10E3/uL    Lymphocytes (Absolute) 1 8 x10E3/uL    Monocytes (Absolute) 0 6 x10E3/uL    Eosinophils (Absolute) 0 3 x10E3/uL    Basophils ABS 0 1 x10E3/uL    Immature Granulocytes 1 Not Estab  %    Immature Granulocytes (Absolute) 0 1 x10E3/uL   Comprehensive metabolic panel   Result Value Ref Range    Glucose, Random 145 (H) 65 - 99 mg/dL    BUN 28 mg/dL    Creatinine 1 61 mg/dL    eGFR Non  CANCELED mL/min/1 73    eGFR  CANCELED mL/min/1 73    SL AMB BUN/CREATININE RATIO 17     Sodium 140 134 - 144 mmol/L    Potassium 4 3 3 5 - 5 2 mmol/L    Chloride 101 96 - 106 mmol/L    CO2 21 20 - 29 mmol/L    CALCIUM 9 6 mg/dL    Protein, Total 7 0 6 0 - 8 5 g/dL    Albumin 4 4 3 7 - 4 7 g/dL    Globulin, Total 2 6 1 5 - 4 5 g/dL    Albumin/Globulin Ratio 1 7 1 2 - 2 2    TOTAL BILIRUBIN 0 3 0 0 - 1 2 mg/dL    Alk Phos Isoenzymes 87 IU/L    AST 17 0 - 40 IU/L    ALT 17 IU/L   Lipid Panel with Direct LDL reflex   Result Value Ref Range    Cholesterol, Total 299 mg/dL    Triglycerides 281 mg/dL    HDL 42 >39 mg/dL    VLDL Cholesterol Calculated 56 (H) 5 - 40 mg/dL    LDL Calculated 201 mg/dL    Comment Comment    Microalbumin / creatinine urine ratio   Result Value Ref Range    Creatinine, Urine 97 1 Not Estab  mg/dL    Microalbum  ,U,Random 473 4 Not Estab  ug/mL    Microalb/Creat Ratio 488 (H) 0 - 29 mg/g creat   LDL cholesterol, direct   Result Value Ref Range    LDL Direct 198 mg/dL   Uric acid   Result Value Ref Range    Uric Acid 8 1 mg/dL   Magnesium   Result Value Ref Range Magnesium, Serum 2 0 1 6 - 2 3 mg/dL     Renal ultrasound done on 10/29/2019 showed nodular density in floor of urinary bladder suspicious for cancer/malignancy  Atrophic left kidney  Renal artery Doppler done on 1/7/2020 showed > 60% narrowing/stenosis of right main renal artery  Left side was difficult to evaluate  ASSESSMENT and PLAN:  Donna Ford was seen today for chronic kidney disease and follow-up  Diagnoses and all orders for this visit:    Stage 3 chronic kidney disease ([address] )  -     CBC and differential; Future  -     Basic metabolic panel; Future  -     Urinalysis with microscopic; Future  -     Microalbumin / creatinine urine ratio; Future  -     Phosphorus; Future  -     Uric acid; Future  -     PTH, intact; Future  -     Vitamin D 25 hydroxy; Future    Hypertensive kidney disease with stage 3 chronic kidney disease (HCC)  -     Basic metabolic panel; Future  -     Urinalysis with microscopic; Future  -     Microalbumin / creatinine urine ratio; Future    Other proteinuria  -     Urinalysis with microscopic; Future  -     Microalbumin / creatinine urine ratio; Future    Acute drug-induced gout of left foot  -     labetalol (NORMODYNE) 300 mg tablet; Take 1 tablet (300 mg total) by mouth 2 (two) times a day      1  CKD stage 3  Multifactorial and was suspected due to underlying hypertensive nephrosclerosis on top of atrophic left kidney and advanced age  Upon review of old medical records, previously known baseline creatinine is around 1 5-1 6 and in the interim renal function has remained stable with current creatinine of 1 58 with EGFR of 32  Patient was also recently diagnosed with bladder cancer and now also been followed by Abran Capps urologist and had underwent cystoscopy in the interim  Management of hypertension as mentioned below   Patient also have only left lower extremity selling which may be related to hip issue  Would not consider use of diuretics at this point    Patient was advised to talk to PCP for further evaluation of hip pain  Plan to avoid NSAIDs and recheck renal function before next visit  2  Hypertension in chronic kidney disease  Today's blood pressure was slightly on the higher side  Currently patient is taking multiple antihypertensive medications including losartan 100 mg in the morning and 50 at night which is unconventionally high dose and will plan to decrease losartan to 100 mg PO daily today  I would plan to increase labetalol to 300 mg PO BID and will plan to continue amlodipine 5 mg PO BID along with clonidine patch 0 3 mg per week  Patient was also advised to make a log of blood pressure reading for our review with the next visit  Patient had underwent renal artery Doppler on 1/7/2020 and also found to have > 60% stenosis of right main renal artery  Would recommend medical management prior to considering renal artery stenting  3  Proteinuria  Multifactorial, current urine microalbumin to creatinine ratio is 517 mg  Continue losartan  Monitor proteinuria  4  Hyperuricemia  Multifactorial, suspected due to underlying chronic kidney disease on top gout  Current uric acid is 8 1  Monitor uric acid level for time being without use of allopurinol  Returns to Nephrology office in 4 months  Future labs ordered  Portions of the record may have been created with voice recognition software  Occasional wrong word or "sound a like" substitutions may have occurred due to the inherent limitations of voice recognition software  Read the chart carefully and recognize, using context, where substitutions have occurred  If you have any questions, please contact the dictating provider

## 2020-07-01 NOTE — TELEPHONE ENCOUNTER
Ms Parnell called today with concerns of LT Hip and Leg swelling  Patient states as per Venkatesh Prado (Neprology) it is imperative she follows up with Primary  As she could possibly have DVT  Patient believes swelling is due to Hip injection not received for some time         Please advise     Tel# [phone]

## 2020-07-01 NOTE — PROGRESS NOTES
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  -     VAS lower limb venous duplex study, unilateral/limited; Future    Acute hip pain, left  -     CT lower extremity wo contrast left; Future    Pain of left lower extremity    Edema of left lower extremity        The patient was counseled regarding instructions for management, risk factor reductions, patient and family education,impressions, risks and benefits of treatment options, side effects of medications, importance of compliance with treatment  The treatment plan was reviewed with the patient/guardian and patient/guardian understands and agrees with the treatment plan              Current Outpatient Medications:     amLODIPine (NORVASC) 5 mg tablet, Take 1 tablet (5 mg total) by mouth 2 (two) times a day, Disp: 60 tablet, Rfl: 11    cloNIDine (CATAPRES-TTS-3) 0 3 mg/24 hr, Place 1 patch (0 3 mg total) on the skin once a week, Disp: 12 patch, Rfl: 2    clopidogrel (PLAVIX) 75 mg tablet, Take 1 tablet (75 mg total) by mouth daily, Disp: 90 tablet, Rfl: 1    Icosapent Ethyl (VASCEPA) 1 g CAPS, 2 cap bid with meals, Disp: 360 capsule, Rfl: 2    labetalol (NORMODYNE) 300 mg tablet, Take 1 tablet (300 mg total) by mouth 2 (two) times a day, Disp: 270 tablet, Rfl: 2    losartan (COZAAR) 100 MG tablet, Take 1 tablet (100 mg total) by mouth every morning, Disp: 90 tablet, Rfl: 2    nicotine (NICOTROL) 10 MG inhaler, Inhale 1 puff as needed for smoking cessation (Patient not taking: Reported on 7/1/2020), Disp: 42 each, Rfl: 2    Subjective:      Patient ID: Dianna Alicia is a 70 y o  female  Pain left hip edema left lower extremity 3 weeks      The following portions of the patient's history were reviewed and updated as appropriate:   She has a past medical history of Arthritis, Chronic kidney disease, Coronary artery disease, Diabetes mellitus ([facility] [address] ), Endometriosis, High cholesterol, Hypertension, Kidney disease, chronic, stage III (moderate, EGFR 30-59 ml/min) (HCC), Lumbar disc herniation, Neuropathy, Peripheral vascular disease ([facility] [address] ), Pneumonia, Shoulder injury, Spinal stenosis, and Stroke ([facility] [address] ) (2015)  ,  does not have any pertinent problems on file  ,   has a past surgical history that includes Rotator cuff repair (Left); Tonsillectomy; Ovarian cyst surgery; Fracture surgery (Right); Colonoscopy; Cardiac catheterization; [place] retrograde pyelogram (4/6/2020); pr cystourethroscopy,fulgur 0 5-2 cm lesn (N/A, 4/6/2020); pr cystourethroscopy,ureter catheter (Bilateral, 4/6/2020); and pr cystoscopy,insert ureteral stent (Right, 4/6/2020)  ,  family history includes Arthritis in her brother; Heart defect in her father; Heart disease in her mother; Hyperlipidemia in her mother; Hypertension in her father and mother; Other in her brother; Stroke in her sister  ,   reports that she has been smoking cigarettes  She has been smoking about 0 00 packs per day  She has never used smokeless tobacco  She reports that she does not drink alcohol or use drugs  ,  is allergic to fenofibrate; atorvastatin; colesevelam; colestipol; ezetimibe; pollen extract; rosuvastatin; and statins       Review of Systems   Constitutional: Negative for appetite change, chills, fatigue, fever and unexpected weight change  HENT: Negative for congestion, ear pain, facial swelling, hearing loss, mouth sores, nosebleeds, postnasal drip, rhinorrhea, sinus pain, sore throat, trouble swallowing and voice change  Eyes: Negative for pain, discharge, redness and visual disturbance     Respiratory: Negative for apnea, chest tightness, shortness of breath, wheezing and stridor  Cardiovascular: Positive for leg swelling  Negative for chest pain and palpitations  Gastrointestinal: Negative for abdominal distention, abdominal pain, blood in stool, constipation, diarrhea and vomiting  Endocrine: Negative for cold intolerance, heat intolerance, polydipsia, polyphagia and polyuria  Genitourinary: Negative for difficulty urinating, dysuria, flank pain, frequency, genital sores, hematuria and urgency  Musculoskeletal: Positive for gait problem  Negative for arthralgias and back pain  Skin: Negative for rash and wound  Allergic/Immunologic: Negative for environmental allergies, food allergies and immunocompromised state  Neurological: Negative for dizziness, tremors, seizures, syncope, facial asymmetry, speech difficulty, weakness, light-headedness, numbness and headaches  Hematological: Negative for adenopathy  Does not bruise/bleed easily  Psychiatric/Behavioral: Negative for agitation, behavioral problems, dysphoric mood, hallucinations, self-injury, sleep disturbance and suicidal ideas  The patient is not hyperactive            Objective:  /70 (BP Location: Right arm, Patient Position: Sitting)   Pulse 80   Temp 97 8 °F (36 6 °C) (Tympanic)   Resp 18   Ht 5' 11" (1 803 m)   Wt 81 2 kg (179 lb)   LMP  (LMP Unknown)   SpO2 98%   BMI 24 97 kg/m²     Lab Review  Orders Only on 06/17/2020   Component Date Value    White Blood Cell Count 06/17/2020 7 0     Red Blood Cell Count 06/17/2020 4 60     Hemoglobin 06/17/2020 13 8     HCT 06/17/2020 39 6     MCV 06/17/2020 86     MCH 06/17/2020 30 0     MCHC 06/17/2020 34 8     RDW 06/17/2020 13 2     Platelet Count 21/24/0617 224     Neutrophils 06/17/2020 59     Lymphocytes 06/17/2020 26     Monocytes 06/17/2020 9     Eosinophils 06/17/2020 4     Basophils PCT 06/17/2020 1     Neutrophils (Absolute) 06/17/2020 4 1     Lymphocytes (Absolute) 06/17/2020 1  8     Monocytes (Absolute) 06/17/2020 0 6     Eosinophils (Absolute) 06/17/2020 0 3     Basophils ABS 06/17/2020 0 1     Immature Granulocytes 06/17/2020 1     Immature Granulocytes (A* 06/17/2020 0 1     Glucose, Random 06/17/2020 145*    BUN 06/17/2020 28     Creatinine 06/17/2020 1 61     eGFR Non  06/17/2020 CANCELED     eGFR  06/17/2020 CANCELED     SL AMB BUN/CREATININE RA* 06/17/2020 17     Sodium 06/17/2020 140     Potassium 06/17/2020 4 3     Chloride 06/17/2020 101     CO2 06/17/2020 21     CALCIUM 06/17/2020 9 6     Protein, Total 06/17/2020 7 0     Albumin 06/17/2020 4 4     Globulin, Total 06/17/2020 2 6     Albumin/Globulin Ratio 06/17/2020 1 7     TOTAL BILIRUBIN 06/17/2020 0 3     Alk Phos Isoenzymes 06/17/2020 87     AST 06/17/2020 17     ALT 06/17/2020 17     Cholesterol, Total 06/17/2020 299     Triglycerides 06/17/2020 281     HDL 06/17/2020 42     VLDL Cholesterol Calcula* 06/17/2020 56*    LDL Calculated 06/17/2020 201     Comment 06/17/2020 Comment     Creatinine, Urine 06/17/2020 97 1     Microalbum  ,U,Random 06/17/2020 473 4     Microalb/Creat Ratio 06/17/2020 488*    LDL Direct 06/17/2020 198     Uric Acid 06/17/2020 8 1     Magnesium, Serum 06/17/2020 2 0    Orders Only on 06/16/2020   Component Date Value    PTH, Intact 06/16/2020 86*   Orders Only on 06/16/2020   Component Date Value    White Blood Cell Count 06/16/2020 7 2     Red Blood Cell Count 06/16/2020 4 77     Hemoglobin 06/16/2020 13 8     HCT 06/16/2020 41 5     MCV 06/16/2020 87     MCH 06/16/2020 28 9     MCHC 06/16/2020 33 3     RDW 06/16/2020 13 0     Platelet Count 14/36/2029 247     Neutrophils 06/16/2020 59     Lymphocytes 06/16/2020 25     Monocytes 06/16/2020 10     Eosinophils 06/16/2020 4     Basophils PCT 06/16/2020 1     Neutrophils (Absolute) 06/16/2020 4 3     Lymphocytes (Absolute) 06/16/2020 1 8     Monocytes (Absolute) 06/16/2020 0 7     Eosinophils (Absolute) 06/16/2020 0 3     Basophils ABS 06/16/2020 0 1     Immature Granulocytes 06/16/2020 1     Immature Granulocytes (A* 06/16/2020 0 1     Specific Gravity 06/16/2020 1 016     Ph 06/16/2020 6 0     Color UA 06/16/2020 Yellow     Urine Appearance 06/16/2020 Cloudy*    Leukocyte Esterase 06/16/2020 2+*    Protein 06/16/2020 2+*    Glucose, 24 HR Urine 06/16/2020 Negative     Ketone, Urine 06/16/2020 Negative     Blood, Urine 06/16/2020 Negative     Bilirubin, Urine 06/16/2020 Negative     Urobilinogen Urine 06/16/2020 0 2     SL AMB NITRITES URINE, Q* 06/16/2020 Positive*    Microscopic Examination 06/16/2020 See below:     SL AMB WBC, URINE 06/16/2020 11-30*    RBC, Urine 06/16/2020 0-2     Epithelial Cells (non re* 06/16/2020 0-10     Bacteria, Urine 06/16/2020 Few     Glucose, Random 06/16/2020 146*    BUN 06/16/2020 29*    Creatinine 06/16/2020 1 58*    eGFR Non  06/16/2020 32*    eGFR  06/16/2020 37*    SL AMB BUN/CREATININE RA* 06/16/2020 18     Sodium 06/16/2020 140     Potassium 06/16/2020 4 7     Chloride 06/16/2020 102     CO2 06/16/2020 22     CALCIUM 06/16/2020 9 8     Creatinine, Urine 06/16/2020 91 8     Microalbum  ,U,Random 06/16/2020 474 3     Microalb/Creat Ratio 06/16/2020 517*    Uric Acid 06/16/2020 8 1*    Phosphorus, Serum 06/16/2020 3 7    Admission on 04/06/2020, Discharged on 04/06/2020   Component Date Value    Case Report 04/06/2020                      Value:Surgical Pathology Report                         Case: [id number]                                   Authorizing Provider:  Magdi Meza MD        Collected:           04/06/2020 0847              Ordering Location:     [facility]        Received:            04/06/2020 [address] [employer] [facility]                                                      Pathologist:           Andreas Medina Esteban Francois MD                                                        Specimen:    Urinary Bladder, bladder tumor right trigonal                                              Final Diagnosis 04/06/2020                      Value: This result contains rich text formatting which cannot be displayed here   Additional Information 04/06/2020                      Value: This result contains rich text formatting which cannot be displayed here  [employer] Description 04/06/2020                      Value: This result contains rich text formatting which cannot be displayed here     Office Visit on 03/24/2020   Component Date Value    Ventricular Rate 03/24/2020 63     Atrial Rate 03/24/2020 63     CO Interval 03/24/2020 170     QRSD Interval 03/24/2020 80     QT Interval 03/24/2020 422     QTC Interval 03/24/2020 431     P Axis 03/24/2020 53     QRS Axis 03/24/2020 -10     T Wave Axis 03/24/2020 108    Appointment on 03/24/2020   Component Date Value    ABO Grouping 03/24/2020 A     Rh Factor 03/24/2020 Negative     Antibody Screen 03/24/2020 Negative     Specimen Expiration Date 03/24/2020 83158157     PTT 03/24/2020 34     Protime 03/24/2020 12 7     INR 03/24/2020 0 96     WBC 03/24/2020 7 11     RBC 03/24/2020 4 94     Hemoglobin 03/24/2020 14 1     Hematocrit 03/24/2020 44 7     MCV 03/24/2020 91     MCH 03/24/2020 28 5     MCHC 03/24/2020 31 5     RDW 03/24/2020 13 2     MPV 03/24/2020 10 4     Platelets 90/26/7945 210     nRBC 03/24/2020 0     Neutrophils Relative 03/24/2020 64     Immat GRANS % 03/24/2020 1     Lymphocytes Relative 03/24/2020 21     Monocytes Relative 03/24/2020 8     Eosinophils Relative 03/24/2020 5     Basophils Relative 03/24/2020 1     Neutrophils Absolute 03/24/2020 4 58     Immature Grans Absolute 03/24/2020 0 07     Lymphocytes Absolute 03/24/2020 1 48     Monocytes Absolute 03/24/2020 0 58     Eosinophils Absolute 03/24/2020 0 34     Basophils Absolute 03/24/2020 0 06     Sodium 03/24/2020 143     Potassium 03/24/2020 4 6     Chloride 03/24/2020 106     CO2 03/24/2020 27     ANION GAP 03/24/2020 10     BUN 03/24/2020 30*    Creatinine 03/24/2020 1 71*    Glucose, Fasting 03/24/2020 148*    Calcium 03/24/2020 9 7     eGFR 03/24/2020 29     Urine Culture 03/24/2020 50,000-59,000 cfu/ml [facility]   Component Date Value    Sodium 02/26/2020 137     Potassium 02/26/2020 4 7     Chloride 02/26/2020 103     CO2 02/26/2020 22     ANION GAP 02/26/2020 12     BUN 02/26/2020 35*    Creatinine 02/26/2020 1 59*    Glucose 02/26/2020 144*    Glucose, Fasting 02/26/2020 144*    Calcium 02/26/2020 9 4     eGFR 02/26/2020 32     POC Glucose 02/26/2020 145*   Orders Only on 02/10/2020   Component Date Value    White Blood Cell Count 02/10/2020 8 5     Red Blood Cell Count 02/10/2020 4 94     Hemoglobin 02/10/2020 14 7     HCT 02/10/2020 42 6     MCV 02/10/2020 86     MCH 02/10/2020 29 8     MCHC 02/10/2020 34 5     RDW 02/10/2020 13 8     Platelet Count 61/03/9632 214     Neutrophils 02/10/2020 64     Lymphocytes 02/10/2020 23     Monocytes 02/10/2020 7     Eosinophils 02/10/2020 4     Basophils PCT 02/10/2020 1     Neutrophils (Absolute) 02/10/2020 5 5     Lymphocytes (Absolute) 02/10/2020 1 9     Monocytes (Absolute) 02/10/2020 0 6     Eosinophils (Absolute) 02/10/2020 0 3     Basophils ABS 02/10/2020 0 0     Immature Granulocytes 02/10/2020 1     Immature Granulocytes (A* 02/10/2020 0 1     Glucose, Random 02/10/2020 140*    BUN 02/10/2020 26     Creatinine 02/10/2020 1 50*    eGFR Non African American 02/10/2020 35*    eGFR  02/10/2020 40*    SL AMB BUN/CREATININE RA* 02/10/2020 17     Sodium 02/10/2020 139     Potassium 02/10/2020 4 3     Chloride 02/10/2020 101     CO2 02/10/2020 21     CALCIUM 02/10/2020 9 7     Protein, Total 02/10/2020 6 6     Albumin 02/10/2020 4 2     Globulin, Total 02/10/2020 2 4     Albumin/Globulin Ratio 02/10/2020 1 8     TOTAL BILIRUBIN 02/10/2020 0 3     Alk Phos Isoenzymes 02/10/2020 94     AST 02/10/2020 13     ALT 02/10/2020 12     INR 02/10/2020 1 0     Prothrombin Time 02/10/2020 10 4    Orders Only on 02/04/2020   Component Date Value    HEP C AB 02/04/2020 <0 1     Interpretation: 02/04/2020 Comment    Orders Only on 01/15/2020   Component Date Value    Class Description 01/15/2020 Comment     D  PTERONYSSINUS IGE 01/15/2020 <0 10     D  FARINAE 01/15/2020 <0 10     Cat Dander 01/15/2020 <0 10     DOG DANDER IGE 01/15/2020 <0 10     BERMUDA GRASS IGE 01/15/2020 <0 10     Blue [place] 01/15/2020 <0 10     Nima Grass 01/15/2020 <0 10     [address] Grass 01/15/2020 <0 10     N306-BmA Phylliss [place]* 01/15/2020 <0 10     PENICILLIUM CHRYSOGENUM 01/15/2020 <0 10     Cladosporium Herbarum 01/15/2020 <0 10     Aspergillus fumigatus 01/15/2020 <0 10     Mucor Racemosus 01/15/2020 <0 10     Alternaria Alternata 01/15/2020 <0 10     S  HERBARUM 01/15/2020 <0 10     Birch 01/15/2020 <0 10     T007 [place] 01/15/2020 <0 10     [place] 01/15/2020 <0 10     MAPLE/BOX ELDER IGE 01/15/2020 <0 10     [place] 01/15/2020 <0 10     T070 [place] 01/15/2020 <0 10     MOUNTAIN CEDAR 01/15/2020 <0 10     Short Ragwd(A jocy ) * 01/15/2020 <0 10     Plantain 01/15/2020 <0 10     COMMON PIGWEED 01/15/2020 <0 10     Nettle 01/15/2020 <0 10     Aspergillus fumigatus 3 * 01/15/2020 Negative     Aspergillus fumigatus 2 * 01/15/2020 Negative     Saccharomonospora Viridi* 01/15/2020 Negative     Thermoactinomyces Candid* 01/15/2020 Negative     A  Fumigatus #1 Abs 01/15/2020 Negative     Aspergillus fumigatus 6 * 01/15/2020 Negative     A  Pullulans Abs 01/15/2020 Negative     M  FAENI ABS 01/15/2020 Negative     [place] Serum Abs 01/15/2020 Negative     T VULGARIS#1 01/15/2020 Negative    There may be more visits with results that are not included  Imaging  @[id number]@  Recent Results (from the past 21939 hour(s))   ECG 12 lead   Result Value    Ventricular Rate 63    Atrial Rate 63    FL Interval 170    QRSD Interval 80    QT Interval 422    QTC Interval 431    P Axis 53    QRS Axis -10    T Wave Axis 108    Narrative    Normal sinus rhythm  Left ventricular hypertrophy with repolarization abnormality  Anteroseptal infarct (cited on or before 2019)  Cannot rule out Inferior infarct  When compared with ECG of 2019 10:49,  No significant change was found  Confirmed by Oni Leonard ([id number]) on 3/24/2020 1:04:57 PM   NM myocardial perfusion spect (rx stress and/or rest)    Narrative    [address]  [place] [address]  [phone]    Rest/Stress Gated SPECT Myocardial Perfusion Imaging After Regadenoson    Patient: Darnell Brito  MR number: [id number]  Account number: [de-identified]  : 1945  Age: 69 years  Gender: Female  Status: Outpatient  Location: Stress lab  Height: 58 in  Weight: 163 lb  BP: 206/ 84 mmHg    Allergies: ATORVASTATIN, COLESEVELAM, COLESTIPOL, EZETIMIBE, FENOFIBRATE, POLLEN EXTRACT, ROSUVASTATIN, STATINS    Diagnosis: R94 31 - Abnormal electrocardiogram [ECG] [EKG]    Primary Physician:  Tabitha Benton DO  Referring Physician:  Tabitha Benton DO  Group:  [facility]  Other:  Rodolfo Aceves MS, CCT  Interpreting Physician:  Herminio Chong MD    INDICATIONS: Detection of coronary artery disease  Pre-operative risk assessment  HISTORY: The patient is a 69year old  female  Chest pain status: no chest pain  Coronary artery disease risk factors: dyslipidemia, hypertension, smoking, diabetes mellitus, and post-menopausal state  Cardiovascular history:  peripheral vascular occlusive disease, stroke, and transient ischemic attack  Co-morbidity: history of renal disease and obesity  GERD, renal and celiac stenosis  Medications: a beta blocker, a calcium channel blocker, an ACE  inhibitor/ARB, clopidogrel, and an antihypertensive agent  Previous test results: abnormal ECG and hyperlipidemia  PHYSICAL EXAM: Baseline physical exam screening: normal     REST ECG: Normal sinus rhythm  Nonspecific ST and T wave abnormalities were present  PROCEDURE: The study was performed in the the Stress lab  A regadenoson infusion pharmacologic stress test was performed  Gated SPECT myocardial perfusion imaging was performed after stress and at rest  Systolic blood pressure was 206  mmHg, at the start of the study  Diastolic blood pressure was 84 mmHg, at the start of the study  The heart rate was 63 bpm, at the start of the study  Regadenoson protocol:  HR bpm SBP mmHg DBP mmHg Symptoms Rhythm/conduct  Baseline 63 206 84 none NSR, no ectopy  Immediate 83 126 84 mild dyspnea, nausea NSR, no ectopy  1 min 76 -- -- none NSR, no ectopy  2 min 71 184 78 none NSR, no ectopy  Rest SpO2 98, Peak Stress SpO2 99    STRESS SUMMARY: Duration of pharmacologic stress was 3 min  Maximal heart rate during stress was 83 bpm  The rate-pressure product for the peak heart rate and blood pressure was 33699  There was no chest pain during stress  The stress test  was terminated due to protocol completion  The stress ECG was negative for ischemia and normal     ISOTOPE ADMINISTRATION:  Resting isotope administration Stress isotope administration  Agent Tetrofosmin Tetrofosmin  Dose 9 9 mCi 29 6 mCi  Date 11/08/2019 11/08/2019  Injection-image interval 30 min 30 min    The radiopharmaceutical was injected at the peak effect of pharmacologic stress  MYOCARDIAL PERFUSION IMAGING:  The image quality was good  PERFUSION DEFECTS:  -  There was a small, mildly severe, partially reversible myocardial perfusion defect of anterior and inferior wall  -  There was of the inferior and anterior wall      GATED SPECT:  The calculated left ventricular ejection fraction was 61 %  There was no left ventricular regional abnormality  SUMMARY:  -  Stress results: There was no chest pain during stress  -  ECG conclusions: The stress ECG was negative for ischemia and normal   -  Perfusion imaging: There was a small, mildly severe, partially reversible myocardial perfusion defect of anterior and inferior wall There was of the inferior and anterior wall  -  Gated SPECT: The calculated left ventricular ejection fraction was 61 %  There was no left ventricular regional abnormality  Prepared and signed by    Hannah Solis MD  Signed 2019 17:53:08     Echo complete with contrast if indicated    Narrative    [address] [place] [address]  [phone]    Transthoracic Echocardiogram  2D, M-mode, Doppler, and Color Doppler    Study date:  2019    Patient: Rahul Santos  MR number: [id number]  Account number: [de-identified]  : 15-Juma-1945  Age: 69 years  Gender: Female  Status: Outpatient  Location: Echo lab  Height: 58 in  Weight: 163 lb  BP: 216/ 86 mmHg    Indications: Abnormal EKG, Assess left ventricular function  Diagnoses: R94 31 - Abnormal electrocardiogram [ECG] [EKG]    Sonographer:   [facility] Avani Bates  Referring Physician:  Pari Elias DO  Group:  [facility]  Interpreting Physician:  Hannah Solis MD    SUMMARY    LEFT VENTRICLE:  Systolic function was normal  Ejection fraction was estimated to be 60 %  There were no regional wall motion abnormalities  Wall thickness was mildly increased  Doppler parameters were consistent with abnormal left ventricular relaxation (grade 1 diastolic dysfunction)  Doppler parameters were consistent with elevated ventricular end-diastolic filling pressure  LEFT ATRIUM:  The atrium was mildly dilated  MITRAL VALVE:  There was mild annular calcification  There was mild regurgitation      TRICUSPID VALVE:  There was trace regurgitation  AORTA:  The root exhibited normal size and mild fibrocalcific change  HISTORY: Consistent with transient ischemic attack or stroke  PRIOR HISTORY: CKD, Risk factors: hypertension, diabetes, and hypercholesterolemia  PROCEDURE: The procedure was performed in the echo lab  This was a routine study  The transthoracic approach was used  The study included complete 2D imaging, M-mode, complete spectral Doppler, and color Doppler  The heart rate was 60 bpm,  at the start of the study  Images were obtained from the parasternal, apical, subcostal, and suprasternal notch acoustic windows  Image quality was adequate  LEFT VENTRICLE: Size was normal  Systolic function was normal  Ejection fraction was estimated to be 60 %  There were no regional wall motion abnormalities  Wall thickness was mildly increased  No evidence of apical thrombus  DOPPLER:  Doppler parameters were consistent with abnormal left ventricular relaxation (grade 1 diastolic dysfunction)  Doppler parameters were consistent with elevated ventricular end-diastolic filling pressure  RIGHT VENTRICLE: The size was normal  Systolic function was normal  Wall thickness was normal     LEFT ATRIUM: The atrium was mildly dilated  RIGHT ATRIUM: Size was normal     MITRAL VALVE: There was mild annular calcification  Valve structure was normal  There was mild-moderate calcification  There was mildly reduced leaflet separation  DOPPLER: The transmitral velocity was within the normal range  There was no  evidence for stenosis  There was mild regurgitation  AORTIC VALVE: The valve was trileaflet  Leaflets exhibited normal thickness and normal cuspal separation  DOPPLER: Transaortic velocity was within the normal range  There was no evidence for stenosis  There was no significant  regurgitation  TRICUSPID VALVE: The valve structure was normal  There was normal leaflet separation   DOPPLER: The transtricuspid velocity was within the normal range  There was no evidence for stenosis  There was trace regurgitation  PULMONIC VALVE: Leaflets exhibited normal thickness, no calcification, and normal cuspal separation  DOPPLER: The transpulmonic velocity was within the normal range  There was no significant regurgitation  PERICARDIUM: There was no pericardial effusion  The pericardium was normal in appearance  AORTA: The root exhibited normal size and mild fibrocalcific change  SYSTEMIC VEINS: IVC: The inferior vena cava was normal in size  SYSTEM MEASUREMENT TABLES    2D  %FS: 30 9 %  Ao Diam: 2 7 cm  EDV(Teich): 80 7 ml  EF(Teich): 58 9 %  ESV(Teich): 33 1 ml  IVSd: 1 1 cm  LA Area: 17 cm2  LA Diam: 3 9 cm  LVEDV MOD A4C: 115 5 ml  LVEF MOD A4C: 60 5 %  LVESV MOD A4C: 45 6 ml  LVIDd: 4 2 cm  LVIDs: 2 9 cm  LVLd A4C: 8 cm  LVLs A4C: 6 7 cm  LVPWd: 1 1 cm  RA Area: 15 1 cm2  RVIDd: 2 9 cm  SV MOD A4C: 69 9 ml  SV(Teich): 47 5 ml    MM  TAPSE: 2 1 cm    PW  E': 0 m/s  E/E': 27 9  MV A Carlos A: 1 5 m/s  MV Dec [place]: 3 4 m/s2  MV DecT: 293 8 ms  MV E Carlos A: 1 m/s  MV E/A Ratio: 0 7  MV PHT: 85 2 ms  MVA By PHT: 2 6 cm2    [facility] Accredited Echocardiography Laboratory    Prepared and electronically signed by    Cesario Alexandre MD  Signed [id number] 17:32:58       VAS lower limb venous duplex study, unilateral/limited    (Results Pending)   CT lower extremity wo contrast left    (Results Pending)     No results found for this or any previous visit  Physical Exam   Constitutional: She is oriented to person, place, and time  She appears well-developed  HENT:   Right Ear: External ear normal    Left Ear: External ear normal    Eyes: Right eye exhibits no discharge  Left eye exhibits no discharge  No scleral icterus  Neck: Carotid bruit is not present  No tracheal deviation present  No thyroid mass and no thyromegaly present     Cardiovascular: Normal rate, regular rhythm, normal heart sounds and intact distal pulses  Exam reveals no gallop and no friction rub  No murmur heard  Pulmonary/Chest: No respiratory distress  She has no wheezes  She has no rales  Musculoskeletal: She exhibits edema  2 plus left   Lymphadenopathy:     She has no cervical adenopathy  Neurological: She is alert and oriented to person, place, and time  Coordination normal    Psychiatric: She has a normal mood and affect  Her behavior is normal  Judgment and thought content normal    Nursing note and vitals reviewed

## 2020-07-01 NOTE — PATIENT INSTRUCTIONS
1 patient with acute left lower extremity edema over the past few weeks will get ultrasound left lower extremity to rule out DVT  2   Patient with acute left hip pain x-ray of left hip showed just mild arthritis will get CT scan of the left hip and pelvis to rule out other etiologies will be seeing her back in August

## 2020-07-01 NOTE — PROGRESS NOTES
NEPHROLOGY OFFICE FOLLOW-UP  Rach Fang 70 y o  @SEX @ YOB: 1945 MRN: [id number]    Encounter: [id number] DATE: 7/1/2020    REASON FOR VISIT: Rach Fang is a 70 y  o female returns to Nephrology office for further management of chronic kidney disease  HPI:    This is 46-year-old female with past medical history significant for hypertension, hyperlipidemia, spinal stenosis, peripheral vascular disease, bladder cancer, returns to Nephrology office for further management of CKD stage 3  Upon review of old medical records, baseline creatinine seems to be fluctuating around 1 5-1 6  Patient was also found to have bladder cancer and now been followed by urologist-Dr Darwin Lim and in the interim has underwent cystoscopy  Patient has underlying hypertension and had underwent renal artery Doppler on 1/7/2020 and also found to have > 60% narrowing of right main renal artery  Left side was difficult to evaluate  Patient said that she has been checking blood pressure at home which has been fluctuating  Patient is a ex-smoker  Patient also complains of left lower extremity swelling only for last few weeks  Patient also said that she also have trouble with her left hip  Patient is also currently been followed by neurologist for CVA-lacunar infarct  Patient takes all the medications as prescribed and denies use of NSAIDs  REVIEW OF SYSTEMS:    Review of Systems   Constitutional: Negative for chills and fever  HENT: Negative for nosebleeds and sore throat  Eyes: Negative for photophobia and pain  Respiratory: Negative for choking and wheezing  Cardiovascular: Positive for leg swelling  Negative for chest pain and palpitations  Gastrointestinal: Negative for abdominal pain and blood in stool  Endocrine: Negative for cold intolerance and heat intolerance  Genitourinary: Negative for flank pain and hematuria     Musculoskeletal: Negative for joint swelling and neck pain    Skin: Negative for color change and pallor  Allergic/Immunologic: Negative for environmental allergies and immunocompromised state  Neurological: Negative for seizures, syncope and speech difficulty  Hematological: Negative for adenopathy  Does not bruise/bleed easily  Psychiatric/Behavioral: Negative for confusion and suicidal ideas  PAST MEDICAL HISTORY:  Past Medical History:   Diagnosis Date    Arthritis     Chronic kidney disease     Coronary artery disease     Diabetes mellitus ([facility] [address] )     Patient states she is not Diabetic    Endometriosis     High cholesterol     Hypertension     Kidney disease, chronic, stage III (moderate, EGFR 30-59 ml/min) ([facility])     Lumbar disc herniation     Neuropathy     Peripheral vascular disease ([facility])     Pneumonia     Shoulder injury     left    Spinal stenosis     [facility]    Memory loss       PAST SURGICAL HISTORY:  Past Surgical History:   Procedure Laterality Date    CARDIAC CATHETERIZATION      COLONOSCOPY      FL RETROGRADE PYELOGRAM  4/6/2020    FRACTURE SURGERY Right     ankle    OVARIAN CYST SURGERY      AZ CYSTOSCOPY,INSERT URETERAL STENT Right 4/6/2020    Procedure: INSERTION STENT URETERAL;  Surgeon: Florina Gutierrez MD;  [facility];  Service: Urology    AZ CYSTOURETHROSCOPY,FULGUR 0 5-2 CM LESN N/A 4/6/2020    Procedure: TRANSURETHRAL RESECTION OF BLADDER TUMOR (TURBT);   Surgeon: Florina Gutierrez MD;  [facility];  Service: Urology    AZ CYSTOURETHROSCOPY,URETER CATHETER Bilateral 4/6/2020    Procedure: CYSTOSCOPY WITH RETROGRADE PYELOGRAM;  Surgeon: Florina Gutierrez MD;  [facility];  Service: Urology    ROTATOR CUFF REPAIR Left     TONSILLECTOMY         SOCIAL HISTORY:  Social History     Substance and Sexual Activity   Alcohol Use No     Social History     Substance and Sexual Activity   Drug Use No     Social History     Tobacco Use   Smoking Status Current Some Day Smoker    Packs/day: 0 00    Types: Cigarettes   Smokeless Tobacco Never Used   Tobacco Comment    3 cigarettes per week       FAMILY HISTORY:  Family History   Problem Relation Age of Onset    Hypertension Mother     Heart disease Mother         Valvular    Hyperlipidemia Mother     Hypertension Father     Heart defect Father         Cardiomegaly    Stroke Sister         Cerebrovascular Accident    Arthritis Brother     Other Brother         Back Disorder       ALLERGY:  Allergies   Allergen Reactions    Fenofibrate Other (See Comments)      blood in urine  hx  Kidney Failure    Atorvastatin Myalgia and Dizziness    Colesevelam Other (See Comments)      leg pains    Colestipol Itching and Other (See Comments)      Swelling lower legs    Ezetimibe GI Intolerance    Pollen Extract Allergic Rhinitis    Rosuvastatin Myalgia    Statins Myalgia       MEDICATIONS:    Current Outpatient Medications:     amLODIPine (NORVASC) 5 mg tablet, Take 1 tablet (5 mg total) by mouth 2 (two) times a day, Disp: 60 tablet, Rfl: 11    cloNIDine (CATAPRES-TTS-3) 0 3 mg/24 hr, Place 1 patch (0 3 mg total) on the skin once a week, Disp: 12 patch, Rfl: 2    clopidogrel (PLAVIX) 75 mg tablet, Take 1 tablet (75 mg total) by mouth daily, Disp: 90 tablet, Rfl: 1    Icosapent Ethyl (VASCEPA) 1 g CAPS, 2 cap bid with meals, Disp: 360 capsule, Rfl: 2    labetalol (NORMODYNE) 300 mg tablet, Take 1 tablet (300 mg total) by mouth 2 (two) times a day, Disp: 270 tablet, Rfl: 2    losartan (COZAAR) 100 MG tablet, Take 1 tablet (100 mg total) by mouth every morning, Disp: 90 tablet, Rfl: 2    cloNIDine (CATAPRES-TTS-2) 0 2 mg/24 hr, Place 1 patch (0 2 mg total) on the skin once a week (Patient not taking: Reported on 7/1/2020), Disp: 12 patch, Rfl: 2    nicotine (NICOTROL) 10 MG inhaler, Inhale 1 puff as needed for smoking cessation (Patient not taking: Reported on 7/1/2020), Disp: 42 each, Rfl: 2    PHYSICAL EXAM:  Vitals:    07/01/20 0857 BP: 158/88   BP Location: Left arm   Patient Position: Sitting   Cuff Size: Standard   Pulse: 65   Resp: 16   Temp: 97 5 °F (36 4 °C)   TempSrc: Tympanic   Weight: 80 7 kg (178 lb)   Height: 5' 11" (1 803 m)     Body mass index is 24 83 kg/m²  Physical Exam   Constitutional: She appears well-nourished  No distress  HENT:   Head: Normocephalic and atraumatic  Eyes: Conjunctivae are normal  No scleral icterus  Right eye exhibits normal extraocular motion  Left eye exhibits normal extraocular motion  Neck: Neck supple  No JVD present  Cardiovascular: Normal rate, S1 normal and S2 normal    Pulmonary/Chest: No accessory muscle usage  No respiratory distress  She has no wheezes  Abdominal: Soft  She exhibits no distension  Musculoskeletal:        Right ankle: She exhibits no swelling  Left ankle: She exhibits swelling  Right hand: She exhibits no laceration  Left hand: She exhibits no laceration  Lymphadenopathy:        Right cervical: No superficial cervical adenopathy present  Left cervical: No superficial cervical adenopathy present  Neurological: She is alert  She is not disoriented  Skin: Skin is warm  No cyanosis  Psychiatric: She is not combative  She does not exhibit a depressed mood  She is communicative         LAB RESULTS:  Results for orders placed or performed in visit on 06/17/20   CBC and differential   Result Value Ref Range    White Blood Cell Count 7 0 3 4 - 10 8 x10E3/uL    Red Blood Cell Count 4 60 x10E6/uL    Hemoglobin 13 8 g/dL    HCT 39 6 %    MCV 86 fL    MCH 30 0 pg    MCHC 34 8 g/dL    RDW 13 2 %    Platelet Count 534 992 - 450 x10E3/uL    Neutrophils 59 Not Estab  %    Lymphocytes 26 Not Estab  %    Monocytes 9 Not Estab  %    Eosinophils 4 Not Estab  %    Basophils PCT 1 Not Estab  %    Neutrophils (Absolute) 4 1 x10E3/uL    Lymphocytes (Absolute) 1 8 x10E3/uL    Monocytes (Absolute) 0 6 x10E3/uL    Eosinophils (Absolute) 0 3 x10E3/uL Basophils ABS 0 1 x10E3/uL    Immature Granulocytes 1 Not Estab  %    Immature Granulocytes (Absolute) 0 1 x10E3/uL   Comprehensive metabolic panel   Result Value Ref Range    Glucose, Random 145 (H) 65 - 99 mg/dL    BUN 28 mg/dL    Creatinine 1 61 mg/dL    eGFR Non  CANCELED mL/min/1 73    eGFR  CANCELED mL/min/1 73    SL AMB BUN/CREATININE RATIO 17     Sodium 140 134 - 144 mmol/L    Potassium 4 3 3 5 - 5 2 mmol/L    Chloride 101 96 - 106 mmol/L    CO2 21 20 - 29 mmol/L    CALCIUM 9 6 mg/dL    Protein, Total 7 0 6 0 - 8 5 g/dL    Albumin 4 4 3 7 - 4 7 g/dL    Globulin, Total 2 6 1 5 - 4 5 g/dL    Albumin/Globulin Ratio 1 7 1 2 - 2 2    TOTAL BILIRUBIN 0 3 0 0 - 1 2 mg/dL    Alk Phos Isoenzymes 87 IU/L    AST 17 0 - 40 IU/L    ALT 17 IU/L   Lipid Panel with Direct LDL reflex   Result Value Ref Range    Cholesterol, Total 299 mg/dL    Triglycerides 281 mg/dL    HDL 42 >39 mg/dL    VLDL Cholesterol Calculated 56 (H) 5 - 40 mg/dL    LDL Calculated 201 mg/dL    Comment Comment    Microalbumin / creatinine urine ratio   Result Value Ref Range    Creatinine, Urine 97 1 Not Estab  mg/dL    Microalbum  ,U,Random 473 4 Not Estab  ug/mL    Microalb/Creat Ratio 488 (H) 0 - 29 mg/g creat   LDL cholesterol, direct   Result Value Ref Range    LDL Direct 198 mg/dL   Uric acid   Result Value Ref Range    Uric Acid 8 1 mg/dL   Magnesium   Result Value Ref Range    Magnesium, Serum 2 0 1 6 - 2 3 mg/dL     Renal ultrasound done on 10/29/2019 showed nodular density in floor of urinary bladder suspicious for cancer/malignancy  Atrophic left kidney  Renal artery Doppler done on 1/7/2020 showed > 60% narrowing/stenosis of right main renal artery  Left side was difficult to evaluate  ASSESSMENT and PLAN:  Anna Hart was seen today for chronic kidney disease and follow-up      Diagnoses and all orders for this visit:    Stage 3 chronic kidney disease ([facility] [address] [facility]  -     CBC and differential; Future  -     Basic metabolic panel; Future  -     Urinalysis with microscopic; Future  -     Microalbumin / creatinine urine ratio; Future  -     Phosphorus; Future  -     Uric acid; Future  -     PTH, intact; Future  -     Vitamin D 25 hydroxy; Future    Hypertensive kidney disease with stage 3 chronic kidney disease (HCC)  -     Basic metabolic panel; Future  -     Urinalysis with microscopic; Future  -     Microalbumin / creatinine urine ratio; Future    Other proteinuria  -     Urinalysis with microscopic; Future  -     Microalbumin / creatinine urine ratio; Future    Acute drug-induced gout of left foot  -     labetalol (NORMODYNE) 300 mg tablet; Take 1 tablet (300 mg total) by mouth 2 (two) times a day      1  CKD stage 3  Multifactorial and was suspected due to underlying hypertensive nephrosclerosis on top of atrophic left kidney and advanced age  Upon review of old medical records, previously known baseline creatinine is around 1 5-1 6 and in the interim renal function has remained stable with current creatinine of 1 58 with EGFR of 32  Patient was also recently diagnosed with bladder cancer and now also been followed by Abran Capps urologist and had underwent cystoscopy in the interim  Management of hypertension as mentioned below   Patient also have only left lower extremity selling which may be related to hip issue  Would not consider use of diuretics at this point  Patient was advised to talk to PCP for further evaluation of hip pain  Plan to avoid NSAIDs and recheck renal function before next visit  2  Hypertension in chronic kidney disease  Today's blood pressure was slightly on the higher side  Currently patient is taking multiple antihypertensive medications including losartan 100 mg in the morning and 50 at night which is unconventionally high dose and will plan to decrease losartan to 100 mg PO daily today    I would plan to increase labetalol to 300 mg PO BID and will plan to continue amlodipine 5 mg PO BID along with clonidine patch 0 3 mg per week  Patient was also advised to make a log of blood pressure reading for our review with the next visit  Patient had underwent renal artery Doppler on 1/7/2020 and also found to have > 60% stenosis of right main renal artery  Would recommend medical management prior to considering renal artery stenting  3  Proteinuria  Multifactorial, current urine microalbumin to creatinine ratio is 517 mg  Continue losartan  Monitor proteinuria  4  Hyperuricemia  Multifactorial, suspected due to underlying chronic kidney disease on top gout  Current uric acid is 8 1  Monitor uric acid level for time being without use of allopurinol  Returns to Nephrology office in 4 months  Future labs ordered  Labs (done on 9/8/2020)  Creatinine 2 02 with EGFR of 24  Hemoglobin 13 9  Urine analysis showed dysuria and urine culture showing Citrobacter  Urine microalbumin to creatinine ratio 231 mg  Hb A1c 7 3%  Normal sodium, potassium, bicarb and calcium  Labs (reviewed on 10/22/2020)  Creatinine 2 24 with EGFR of 21-> plan to refer patient to Kidney Smart class + consider stopping losartan  Hemoglobin 13 0  Urine analysis showed no dysuria  Urine microalbumin to creatinine ratio of 27 mg -> consider stopping losartan in the light of worsening renal function  Vitamin-D 22-> start cholecalciferol 2000 Units PO daily  Uric acid 10 9-> start allopurinol 100 mg PO daily  Normal PTH (48), sodium, potassium, bicarb, calcium and phosphorus  Portions of the record may have been created with voice recognition software  Occasional wrong word or "sound a like" substitutions may have occurred due to the inherent limitations of voice recognition software  Read the chart carefully and recognize, using context, where substitutions have occurred  If you have any questions, please contact the dictating provider

## 2020-07-08 ENCOUNTER — TELEPHONE (OUTPATIENT)
Dept: INTERNAL MEDICINE CLINIC | Facility: CLINIC | Age: 75
End: 2020-07-08

## 2020-07-08 ENCOUNTER — HOSPITAL ENCOUNTER (OUTPATIENT)
Dept: CT IMAGING | Facility: CLINIC | Age: 75
Discharge: HOME | End: 2020-07-08
Payer: COMMERCIAL

## 2020-07-08 DIAGNOSIS — M25.552 ACUTE HIP PAIN, LEFT: Primary | ICD-10-CM

## 2020-07-08 DIAGNOSIS — M25.552 ACUTE HIP PAIN, LEFT: ICD-10-CM

## 2020-07-08 PROCEDURE — 73700 CT LOWER EXTREMITY W/O DYE: CPT

## 2020-07-08 NOTE — TELEPHONE ENCOUNTER
----- Message from Debbie Parry DO sent at 7/8/2020 12:43 PM EDT -----  Please call patient CT scan of left hip did not show any significant pathology only minimal arthritis

## 2020-07-13 ENCOUNTER — HOSPITAL ENCOUNTER (EMERGENCY)
Facility: HOSPITAL | Age: 75
Discharge: HOME | End: 2020-07-13
Attending: EMERGENCY MEDICINE | Admitting: EMERGENCY MEDICINE
Payer: COMMERCIAL

## 2020-07-13 ENCOUNTER — TELEPHONE (OUTPATIENT)
Dept: INTERNAL MEDICINE CLINIC | Facility: CLINIC | Age: 75
End: 2020-07-13

## 2020-07-13 VITALS
TEMPERATURE: 98.7 F | SYSTOLIC BLOOD PRESSURE: 190 MMHG | DIASTOLIC BLOOD PRESSURE: 78 MMHG | RESPIRATION RATE: 17 BRPM | OXYGEN SATURATION: 100 % | BODY MASS INDEX: 26.32 KG/M2 | HEART RATE: 78 BPM | WEIGHT: 188.71 LBS

## 2020-07-13 DIAGNOSIS — M10.9 ACUTE GOUT OF RIGHT FOOT: Primary | ICD-10-CM

## 2020-07-13 PROCEDURE — 99284 EMERGENCY DEPT VISIT MOD MDM: CPT | Performed by: EMERGENCY MEDICINE

## 2020-07-13 PROCEDURE — 99283 EMERGENCY DEPT VISIT LOW MDM: CPT

## 2020-07-13 RX ORDER — TRAMADOL HYDROCHLORIDE 50 MG/1
50 TABLET ORAL EVERY 6 HOURS PRN
Qty: 15 TABLET | Refills: 0 | Status: SHIPPED | OUTPATIENT
Start: 2020-07-13 | End: 2020-07-23

## 2020-07-13 RX ORDER — TRAMADOL HYDROCHLORIDE 50 MG/1
50 TABLET ORAL ONCE
Status: COMPLETED | OUTPATIENT
Start: 2020-07-13 | End: 2020-07-13

## 2020-07-13 RX ADMIN — TRAMADOL HYDROCHLORIDE 50 MG: 50 TABLET, FILM COATED ORAL at 13:17

## 2020-07-13 NOTE — ED PROVIDER NOTES
History  Chief Complaint   Patient presents with    Leg Pain     Pt reports b/l leg pain and swelling x2 days  History provided by:  Patient and spouse  Leg Pain   Location:  Foot  Time since incident:  2 days  Injury: no    Foot location:  R foot  Pain details:     Quality:  Aching    Radiates to:  Does not radiate    Severity:  Moderate    Onset quality:  Gradual    Duration:  2 days    Timing:  Constant    Progression:  Worsening  Chronicity:  New  Foreign body present:  No foreign bodies  Tetanus status:  Up to date  Prior injury to area:  No (Has had prior pain similar to this was turned out to be gout)  Relieved by:  Nothing  Worsened by: Activity  Ineffective treatments:  None tried  Associated symptoms: no fever and no neck pain    Associated symptoms comment:  Difficulty ambulating on due to pain  , states she has swelling over bilateral lower extremities but this is chronic and unchanged from baseline  Has history neuropathy but this feels different  States this feels very similar to previous gout attacks      Prior to Admission Medications   Prescriptions Last Dose Informant Patient Reported? Taking?    Icosapent Ethyl (VASCEPA) 1 g CAPS  Self No No   Si cap bid with meals   amLODIPine (NORVASC) 5 mg tablet  Self No No   Sig: Take 1 tablet (5 mg total) by mouth 2 (two) times a day   cloNIDine (CATAPRES-TTS-3) 0 3 mg/24 hr  Self No No   Sig: Place 1 patch (0 3 mg total) on the skin once a week   clopidogrel (PLAVIX) 75 mg tablet  Self No No   Sig: Take 1 tablet (75 mg total) by mouth daily   labetalol (NORMODYNE) 300 mg tablet   No No   Sig: Take 1 tablet (300 mg total) by mouth 2 (two) times a day   losartan (COZAAR) 100 MG tablet  Self No No   Sig: Take 1 tablet (100 mg total) by mouth every morning   nicotine (NICOTROL) 10 MG inhaler  Self No No   Sig: Inhale 1 puff as needed for smoking cessation   Patient not taking: Reported on 2020      Facility-Administered Medications: None Past Medical History:   Diagnosis Date    Arthritis     Chronic kidney disease     Coronary artery disease     Diabetes mellitus ([facility] [address] )     Patient states she is not Diabetic    Endometriosis     High cholesterol     Hypertension     Kidney disease, chronic, stage III (moderate, EGFR 30-59 ml/min) ([facility])     Lumbar disc herniation     Neuropathy     Peripheral vascular disease ([facility])     Pneumonia     Shoulder injury     left    Spinal stenosis     Stroke ([facility] [address] ) 2015    Memory loss       Past Surgical History:   Procedure Laterality Date    CARDIAC CATHETERIZATION      COLONOSCOPY      FL RETROGRADE PYELOGRAM  4/6/2020    FRACTURE SURGERY Right     ankle    OVARIAN CYST SURGERY      AZ CYSTOSCOPY,INSERT URETERAL STENT Right 4/6/2020    Procedure: INSERTION STENT URETERAL;  Surgeon: Yu Carter MD;  [facility];  Service: Urology    AZ CYSTOURETHROSCOPY,FULGUR 0 5-2 CM LESN N/A 4/6/2020    Procedure: TRANSURETHRAL RESECTION OF BLADDER TUMOR (TURBT); Surgeon: Yu Carter MD;  [facility];  Service: Urology    AZ CYSTOURETHROSCOPY,URETER CATHETER Bilateral 4/6/2020    Procedure: CYSTOSCOPY WITH RETROGRADE PYELOGRAM;  Surgeon: Yu Carter MD;  [facility];  Service: Urology    ROTATOR CUFF REPAIR Left     TONSILLECTOMY         Family History   Problem Relation Age of Onset    Hypertension Mother     Heart disease Mother         Valvular    Hyperlipidemia Mother     Hypertension Father     Heart defect Father         Cardiomegaly    Stroke Sister         Cerebrovascular Accident    Arthritis Brother     Other Brother         Back Disorder     I have reviewed and agree with the history as documented      E-Cigarette/Vaping    E-Cigarette Use Never User      E-Cigarette/Vaping Substances    THC No     CBD No      Social History     Tobacco Use    Smoking status: Current Some Day Smoker     Packs/day: 0 00     Types: Cigarettes    Smokeless tobacco: Never Used    Tobacco comment: 3 cigarettes per week   Substance Use Topics    Alcohol use: No    Drug use: No       Review of Systems   Constitutional: Negative for activity change, chills, diaphoresis and fever  HENT: Negative for congestion, sinus pressure and sore throat  Eyes: Negative for pain and visual disturbance  Respiratory: Negative for cough, chest tightness, shortness of breath, wheezing and stridor  Cardiovascular: Negative for chest pain and palpitations  Gastrointestinal: Negative for abdominal distention, abdominal pain, constipation, diarrhea, nausea and vomiting  Genitourinary: Negative for dysuria and frequency  Musculoskeletal: Negative for neck pain and neck stiffness  Skin: Negative for rash  Neurological: Negative for dizziness, speech difficulty, light-headedness, numbness and headaches  Physical Exam  Physical Exam   Constitutional: She is oriented to person, place, and time  She appears well-developed  No distress  HENT:   Head: Normocephalic and atraumatic  Eyes: Pupils are equal, round, and reactive to light  Neck: Normal range of motion  Neck supple  No tracheal deviation present  Cardiovascular: Normal rate, regular rhythm, normal heart sounds and intact distal pulses  No murmur heard  Pulmonary/Chest: Effort normal and breath sounds normal  No stridor  No respiratory distress  Abdominal: Soft  She exhibits no distension  There is no tenderness  There is no rebound and no guarding  Musculoskeletal: Normal range of motion  She exhibits edema  Bilateral pitting edema of her bilateral lower extremities  Right foot:, tender to palpation with compression or with weight-bearing, tender mainly over the metatarsal region, she points mainly 2nd 3rd and 4th metatarsals  There is no overlying skin changes  There are no wounds  No injury or foreign bodies on the underside of the foot or between the toes     Neurological: She is alert and oriented to person, place, and time  Skin: Skin is warm and dry  She is not diaphoretic  No erythema  No pallor  Psychiatric: She has a normal mood and affect  Vitals reviewed  Vital Signs  ED Triage Vitals   Temperature Pulse Respirations Blood Pressure SpO2   07/13/20 1301 07/13/20 1301 07/13/20 1301 07/13/20 1301 07/13/20 1301   98 7 °F (37 1 °C) 77 18 145/79 99 %      Temp src Heart Rate Source Patient Position - Orthostatic VS BP Location FiO2 (%)   -- 07/13/20 1301 07/13/20 1317 07/13/20 1301 --    Monitor Sitting Right arm       Pain Score       07/13/20 1305       1           Vitals:    07/13/20 1301 07/13/20 1317   BP: 145/79 (!) 190/78   Pulse: 77 78   Patient Position - Orthostatic VS:  Sitting         Visual Acuity      ED Medications  Medications   traMADol (ULTRAM) tablet 50 mg (50 mg Oral Given 7/13/20 1317)       Diagnostic Studies  Results Reviewed     None                 No orders to display              Procedures  Procedures         ED Course       US AUDIT      Most Recent Value   Initial Alcohol Screen: US AUDIT-C    1  How often do you have a drink containing alcohol? 1 Filed at: 07/13/2020 1305   2  How many drinks containing alcohol do you have on a typical day you are drinking? 1 Filed at: 07/13/2020 1305   3b  FEMALE Any Age, or MALE 65+: How often do you have 4 or more drinks on one occassion? 0 Filed at: 07/13/2020 1305   Audit-C Score  2 Filed at: 07/13/2020 1305                  DALE/DAST-10      Most Recent Value   How many times in the past year have you    Used an illegal drug or used a prescription medication for non-medical reasons?   Never Filed at: 07/13/2020 1305                                MDM  Number of Diagnoses or Management Options  Acute gout of right foot: new and requires workup  Diagnosis management comments: 72-year-old female atraumatic right foot pain states feels similar to gout, likely gout, could be neuropathy but as is significantly worse with palpation or compression the gout is more likely  She does have swollen bilateral legs but this is unchanged from baseline as per patient  No difficulty breathing satting 100% on room air  , will discharge with tramadol she states this has worked well for her in the past   Patient cannot take anti-inflammatories due to kidney disease, do not want put on steroids due to borderline diabetes  Atraumatic so no indication for x-ray       Amount and/or Complexity of Data Reviewed  Decide to obtain previous medical records or to obtain history from someone other than the patient: yes  Obtain history from someone other than the patient: yes  Review and summarize past medical records: yes          Disposition  Final diagnoses:   Acute gout of right foot     Time reflects when diagnosis was documented in both MDM as applicable and the Disposition within this note     Time User Action Codes Description Comment    7/13/2020  1:11 PM Yanni Huynh Add [M10 9] Acute gout of right foot       ED Disposition     ED Disposition Condition Date/Time Comment    Discharge Stable Mon Jul 13, 2020  1:11 PM Liss Aburto discharge to home/self care              Follow-up Information     Follow up With Specialties Details Why [facility],  Internal Medicine Call in 1 day To arrange for the next available appointment, For repeat evaluation and  to ensure resolution [address]  [address] [address]  [place] [address]  [phone]            Discharge Medication List as of 7/13/2020  1:13 PM      START taking these medications    Details   traMADol (ULTRAM) 50 mg tablet Take 1 tablet (50 mg total) by mouth every 6 (six) hours as needed for moderate pain for up to 10 days Label No driving, Starting Mon 7/13/2020, Until Thu 7/23/2020, Normal         CONTINUE these medications which have NOT CHANGED    Details   amLODIPine (NORVASC) 5 mg tablet Take 1 tablet (5 mg total) by mouth 2 (two) times a day, Starting Thu 10/10/2019, Until Sun 10/4/2020, Normal      cloNIDine (CATAPRES-TTS-3) 0 3 mg/24 hr Place 1 patch (0 3 mg total) on the skin once a week, Starting Tue 5/12/2020, Normal      clopidogrel (PLAVIX) 75 mg tablet Take 1 tablet (75 mg total) by mouth daily, Starting Thu 10/10/2019, Normal      Icosapent Ethyl (VASCEPA) 1 g CAPS 2 cap bid with meals, Normal      labetalol (NORMODYNE) 300 mg tablet Take 1 tablet (300 mg total) by mouth 2 (two) times a day, Starting Wed 7/1/2020, Until Tue 9/29/2020, Normal      losartan (COZAAR) 100 MG tablet Take 1 tablet (100 mg total) by mouth every morning, Starting Tue 5/12/2020, Normal      nicotine (NICOTROL) 10 MG inhaler Inhale 1 puff as needed for smoking cessation, Starting Wed 4/15/2020, No Print           No discharge procedures on file      PDMP Review       Value Time User    PDMP Reviewed  Yes 4/6/2020  8:10 AM Laura Goss MD          ED Provider  Electronically Signed by           Esperanza Moreno DO  07/13/20 7808

## 2020-07-13 NOTE — TELEPHONE ENCOUNTER
S/w Pt advised to go to ER to eval and treat as Per [place]  Pt asked If I could order a wheelchair as she can not walk to leave house  Pt advised to call EMS to transport  Pt stated she would call them and go  Call [facility]

## 2020-07-13 NOTE — TELEPHONE ENCOUNTER
[facility] called with questions regarding Prior auth for Ct Scan of Pelvis  Scan was approved however CPT code is incorrect  Current code is 35039: Ct Lower extremity  CPT should be 16547: Ct Pelvic w/o con    Hailey Barroso is asking for call before 4pm with prior auth change  Patient's appt  Is tomorrow morning       Tel [phone]

## 2020-07-14 NOTE — TELEPHONE ENCOUNTER
Pt has w/c ,not treated for urine,tramadol is helping pain still not able to tolerated much weight bearing or movement  Not treated for gout  Has f/u apt with Dr Ridley 08/05/2020 due we need to schedule sooner apt to address ?

## 2020-07-15 ENCOUNTER — OFFICE VISIT (OUTPATIENT)
Dept: INTERNAL MEDICINE CLINIC | Facility: CLINIC | Age: 75
End: 2020-07-15
Payer: COMMERCIAL

## 2020-07-15 VITALS
HEART RATE: 69 BPM | DIASTOLIC BLOOD PRESSURE: 74 MMHG | SYSTOLIC BLOOD PRESSURE: 136 MMHG | OXYGEN SATURATION: 98 % | TEMPERATURE: 98.4 F

## 2020-07-15 DIAGNOSIS — E11.21 TYPE 2 DIABETES MELLITUS WITH DIABETIC NEPHROPATHY, WITHOUT LONG-TERM CURRENT USE OF INSULIN (HCC): ICD-10-CM

## 2020-07-15 DIAGNOSIS — R60.0 LOCALIZED EDEMA: ICD-10-CM

## 2020-07-15 DIAGNOSIS — M1A.3721 CHRONIC GOUT DUE TO RENAL IMPAIRMENT INVOLVING TOE OF LEFT FOOT WITH TOPHUS: Primary | ICD-10-CM

## 2020-07-15 DIAGNOSIS — M1A.3720 CHRONIC GOUT DUE TO RENAL IMPAIRMENT INVOLVING TOE OF LEFT FOOT WITHOUT TOPHUS: ICD-10-CM

## 2020-07-15 DIAGNOSIS — R82.998 DARK URINE: Primary | ICD-10-CM

## 2020-07-15 LAB
SL AMB  POCT GLUCOSE, UA: ABNORMAL
SL AMB LEUKOCYTE ESTERASE,UA: ABNORMAL
SL AMB POCT BILIRUBIN,UA: ABNORMAL
SL AMB POCT BLOOD,UA: ABNORMAL
SL AMB POCT CLARITY,UA: ABNORMAL
SL AMB POCT COLOR,UA: ABNORMAL
SL AMB POCT HEMOGLOBIN AIC: 6.8 (ref ?–6.5)
SL AMB POCT KETONES,UA: ABNORMAL
SL AMB POCT NITRITE,UA: ABNORMAL
SL AMB POCT PH,UA: 5
SL AMB POCT SPECIFIC GRAVITY,UA: 1.03
SL AMB POCT URINE PROTEIN: ABNORMAL
SL AMB POCT UROBILINOGEN: 0.2

## 2020-07-15 PROCEDURE — 3075F SYST BP GE 130 - 139MM HG: CPT | Performed by: NURSE PRACTITIONER

## 2020-07-15 PROCEDURE — 81001 URINALYSIS AUTO W/SCOPE: CPT | Performed by: NURSE PRACTITIONER

## 2020-07-15 PROCEDURE — 87086 URINE CULTURE/COLONY COUNT: CPT | Performed by: NURSE PRACTITIONER

## 2020-07-15 PROCEDURE — 99214 OFFICE O/P EST MOD 30 MIN: CPT | Performed by: NURSE PRACTITIONER

## 2020-07-15 PROCEDURE — 83036 HEMOGLOBIN GLYCOSYLATED A1C: CPT | Performed by: NURSE PRACTITIONER

## 2020-07-15 PROCEDURE — 3044F HG A1C LEVEL LT 7.0%: CPT | Performed by: INTERNAL MEDICINE

## 2020-07-15 PROCEDURE — 4040F PNEUMOC VAC/ADMIN/RCVD: CPT | Performed by: NURSE PRACTITIONER

## 2020-07-15 PROCEDURE — 87077 CULTURE AEROBIC IDENTIFY: CPT | Performed by: NURSE PRACTITIONER

## 2020-07-15 PROCEDURE — 87186 SC STD MICRODIL/AGAR DIL: CPT | Performed by: NURSE PRACTITIONER

## 2020-07-15 PROCEDURE — 3078F DIAST BP <80 MM HG: CPT | Performed by: NURSE PRACTITIONER

## 2020-07-15 PROCEDURE — 1160F RVW MEDS BY RX/DR IN RCRD: CPT | Performed by: NURSE PRACTITIONER

## 2020-07-15 PROCEDURE — 81002 URINALYSIS NONAUTO W/O SCOPE: CPT | Performed by: NURSE PRACTITIONER

## 2020-07-15 PROCEDURE — 3066F NEPHROPATHY DOC TX: CPT | Performed by: NURSE PRACTITIONER

## 2020-07-15 RX ORDER — PREDNISONE 10 MG/1
TABLET ORAL
Qty: 30 TABLET | Refills: 0 | Status: SHIPPED | OUTPATIENT
Start: 2020-07-15 | End: 2020-08-05

## 2020-07-15 NOTE — PROGRESS NOTES
BMI Counseling: There is no height or weight on file to calculate BMI  The BMI is above normal  Nutrition recommendations include decreasing portion sizes, encouraging healthy choices of fruits and vegetables, decreasing fast food intake, consuming healthier snacks, limiting drinks that contain sugar, moderation in carbohydrate intake, increasing intake of lean protein, reducing intake of saturated and trans fat and reducing intake of cholesterol  Exercise recommendations include moderate physical activity 150 minutes/week  No pharmacotherapy was ordered  Patient referred to PCP due to patient being overweight  Assessment/Plan:    1  B/L LE edema- Will apply compression stockings every morning, remove prior to bedtime  2  Gout of right ankle- Start tapering doses of Prednisone  This may affect your blood sugars  3  Urine is dark yellow, was previously treated for UTI, will send urine out for culture  4  Diabetes- A1c in office today was A1c 6 8- Remove all ice cream and eclairs etc from your home  Limit white potatoes, pasta, rice, bread  5  Hypertension at goal     Follow up with me as needed and with Dr Ann Hernandez as scheduled next month, labs prior  Diagnoses and all orders for this visit:    Dark urine  -     UA/M w/rflx Culture, Comp  -     predniSONE 10 mg tablet; Take 4 tabs by mouth x 3 days, then 3 tabs by mouth x 3 days, then 2 tabs by mouth x 3 days, then 1 tab by mouth x 3 days  -     Compression Stocking    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  -     UA/M w/rflx Culture, Comp  -     predniSONE 10 mg tablet; Take 4 tabs by mouth x 3 days, then 3 tabs by mouth x 3 days, then 2 tabs by mouth x 3 days, then 1 tab by mouth x 3 days  -     Compression Stocking    Localized edema  -     UA/M w/rflx Culture, Comp  -     predniSONE 10 mg tablet;  Take 4 tabs by mouth x 3 days, then 3 tabs by mouth x 3 days, then 2 tabs by mouth x 3 days, then 1 tab by mouth x 3 days  -     Compression Stocking        The patient was counseled regarding instructions for management, risk factor reductions, patient and family education,impressions, risks and benefits of treatment options, side effects of medications, importance of compliance with treatment  The treatment plan was reviewed with the patient/guardian and patient/guardian understands and agrees with the treatment plan  Current Outpatient Medications:     amLODIPine (NORVASC) 5 mg tablet, Take 1 tablet (5 mg total) by mouth 2 (two) times a day, Disp: 60 tablet, Rfl: 11    cloNIDine (CATAPRES-TTS-3) 0 3 mg/24 hr, Place 1 patch (0 3 mg total) on the skin once a week, Disp: 12 patch, Rfl: 2    clopidogrel (PLAVIX) 75 mg tablet, Take 1 tablet (75 mg total) by mouth daily, Disp: 90 tablet, Rfl: 1    Icosapent Ethyl (VASCEPA) 1 g CAPS, 2 cap bid with meals, Disp: 360 capsule, Rfl: 2    labetalol (NORMODYNE) 300 mg tablet, Take 1 tablet (300 mg total) by mouth 2 (two) times a day, Disp: 270 tablet, Rfl: 2    losartan (COZAAR) 100 MG tablet, Take 1 tablet (100 mg total) by mouth every morning, Disp: 90 tablet, Rfl: 2    traMADol (ULTRAM) 50 mg tablet, Take 1 tablet (50 mg total) by mouth every 6 (six) hours as needed for moderate pain for up to 10 days Label No driving, Disp: 15 tablet, Rfl: 0    nicotine (NICOTROL) 10 MG inhaler, Inhale 1 puff as needed for smoking cessation (Patient not taking: Reported on 7/15/2020), Disp: 42 each, Rfl: 2    predniSONE 10 mg tablet, Take 4 tabs by mouth x 3 days, then 3 tabs by mouth x 3 days, then 2 tabs by mouth x 3 days, then 1 tab by mouth x 3 days, Disp: 30 tablet, Rfl: 0    Subjective:      Patient ID: Randall Gibson is a 70 y o  female  About one week ago, woke up with pain in right ankle  Area is now red and warm  She continues to have swelling B/L feet and right thigh discomfort        The following portions of the patient's history were reviewed and updated as appropriate:   She has a past medical history of Arthritis, Chronic kidney disease, Coronary artery disease, Diabetes mellitus ([facility] [address] ), Endometriosis, High cholesterol, Hypertension, Kidney disease, chronic, stage III (moderate, EGFR 30-59 ml/min) (HCC), Lumbar disc herniation, Neuropathy, Peripheral vascular disease ([facility] [address] ), Pneumonia, Shoulder injury, Spinal stenosis, and [facility] (2015)  ,  does not have any pertinent problems on file  ,   has a past surgical history that includes Rotator cuff repair (Left); Tonsillectomy; Ovarian cyst surgery; Fracture surgery (Right); Colonoscopy; Cardiac catheterization; [place] retrograde pyelogram (4/6/2020); pr cystourethroscopy,fulgur 0 5-2 cm lesn (N/A, 4/6/2020); pr cystourethroscopy,ureter catheter (Bilateral, 4/6/2020); and pr cystoscopy,insert ureteral stent (Right, 4/6/2020)  ,  family history includes Arthritis in her brother; Heart defect in her father; Heart disease in her mother; Hyperlipidemia in her mother; Hypertension in her father and mother; Other in her brother; Stroke in her sister  ,   reports that she has been smoking cigarettes  She has been smoking about 0 00 packs per day  She has never used smokeless tobacco  She reports that she does not drink alcohol or use drugs  ,  is allergic to fenofibrate; atorvastatin; colesevelam; colestipol; ezetimibe; pollen extract; rosuvastatin; and statins       Review of Systems   Constitutional: Negative  Respiratory: Negative  Cardiovascular: Positive for leg swelling  Musculoskeletal: Negative  Skin: Positive for color change  Psychiatric/Behavioral: Negative            Objective:  /74 (BP Location: Left arm, Patient Position: Sitting, Cuff Size: Large)   Pulse 69   Temp 98 4 °F (36 9 °C)   LMP  (LMP Unknown)   SpO2 98%     Lab Review  Orders Only on 06/17/2020   Component Date Value    White Blood Cell Count 06/17/2020 7 0     Red Blood Cell Count 06/17/2020 4 60     Hemoglobin 06/17/2020 13 8     HCT 06/17/2020 39 6     MCV 06/17/2020 86     MCH 06/17/2020 30 0     MCHC 06/17/2020 34 8     RDW 06/17/2020 13 2     Platelet Count 33/53/9910 224     Neutrophils 06/17/2020 59     Lymphocytes 06/17/2020 26     Monocytes 06/17/2020 9     Eosinophils 06/17/2020 4     Basophils PCT 06/17/2020 1     Neutrophils (Absolute) 06/17/2020 4 1     Lymphocytes (Absolute) 06/17/2020 1 8     Monocytes (Absolute) 06/17/2020 0 6     Eosinophils (Absolute) 06/17/2020 0 3     Basophils ABS 06/17/2020 0 1     Immature Granulocytes 06/17/2020 1     Immature Granulocytes (A* 06/17/2020 0 1     Glucose, Random 06/17/2020 145*    BUN 06/17/2020 28     Creatinine 06/17/2020 1 61     eGFR Non  06/17/2020 CANCELED     eGFR  06/17/2020 CANCELED     SL AMB BUN/CREATININE RA* 06/17/2020 17     Sodium 06/17/2020 140     Potassium 06/17/2020 4 3     Chloride 06/17/2020 101     CO2 06/17/2020 21     CALCIUM 06/17/2020 9 6     Protein, Total 06/17/2020 7 0     Albumin 06/17/2020 4 4     Globulin, Total 06/17/2020 2 6     Albumin/Globulin Ratio 06/17/2020 1 7     TOTAL BILIRUBIN 06/17/2020 0 3     Alk Phos Isoenzymes 06/17/2020 87     AST 06/17/2020 17     ALT 06/17/2020 17     Cholesterol, Total 06/17/2020 299     Triglycerides 06/17/2020 281     HDL 06/17/2020 42     VLDL Cholesterol Calcula* 06/17/2020 56*    LDL Calculated 06/17/2020 201     Comment 06/17/2020 Comment     Creatinine, Urine 06/17/2020 97 1     Microalbum  ,U,Random 06/17/2020 473 4     Microalb/Creat Ratio 06/17/2020 488*    LDL Direct 06/17/2020 198     Uric Acid 06/17/2020 8 1     Magnesium, Serum 06/17/2020 2 0    Orders Only on 06/16/2020   Component Date Value    PTH, Intact 06/16/2020 86*   Orders Only on 06/16/2020   Component Date Value    White Blood Cell Count 06/16/2020 7 2     Red Blood Cell Count 06/16/2020 4 77     Hemoglobin 06/16/2020 13 8     HCT 06/16/2020 41 5     MCV 06/16/2020 87     MCH 06/16/2020 28 9     MCHC 06/16/2020 33 3     RDW 06/16/2020 13 0     Platelet Count 00/15/2281 247     Neutrophils 06/16/2020 59     Lymphocytes 06/16/2020 25     Monocytes 06/16/2020 10     Eosinophils 06/16/2020 4     Basophils PCT 06/16/2020 1     Neutrophils (Absolute) 06/16/2020 4 3     Lymphocytes (Absolute) 06/16/2020 1 8     Monocytes (Absolute) 06/16/2020 0 7     Eosinophils (Absolute) 06/16/2020 0 3     Basophils ABS 06/16/2020 0 1     Immature Granulocytes 06/16/2020 1     Immature Granulocytes (A* 06/16/2020 0 1     Specific Gravity 06/16/2020 1 016     Ph 06/16/2020 6 0     Color UA 06/16/2020 Yellow     Urine Appearance 06/16/2020 Cloudy*    Leukocyte Esterase 06/16/2020 2+*    Protein 06/16/2020 2+*    Glucose, 24 HR Urine 06/16/2020 Negative     Ketone, Urine 06/16/2020 Negative     Blood, Urine 06/16/2020 Negative     Bilirubin, Urine 06/16/2020 Negative     Urobilinogen Urine 06/16/2020 0 2     SL AMB NITRITES URINE, Q* 06/16/2020 Positive*    Microscopic Examination 06/16/2020 See below:     SL AMB WBC, URINE 06/16/2020 11-30*    RBC, Urine 06/16/2020 0-2     Epithelial Cells (non re* 06/16/2020 0-10     Bacteria, Urine 06/16/2020 Few     Glucose, Random 06/16/2020 146*    BUN 06/16/2020 29*    Creatinine 06/16/2020 1 58*    eGFR Non  06/16/2020 32*    eGFR  06/16/2020 37*    SL AMB BUN/CREATININE RA* 06/16/2020 18     Sodium 06/16/2020 140     Potassium 06/16/2020 4 7     Chloride 06/16/2020 102     CO2 06/16/2020 22     CALCIUM 06/16/2020 9 8     Creatinine, Urine 06/16/2020 91 8     Microalbum  ,U,Random 06/16/2020 474 3     Microalb/Creat Ratio 06/16/2020 517*    Uric Acid 06/16/2020 8 1*    Phosphorus, Serum 06/16/2020 3 7         Imaging: Vas Lower Limb Venous Duplex Study, Unilateral/limited    Result Date: 7/1/2020  Narrative:  [facility] REPORT [facility]INICAL: Indications: Left Swelling of Limb [R22 4]  Left Limb Pain [M79 609] x 2 weeks  Patient denies recent travel  Patient is on Plavix  Operative History: 2 colon polyps adhesions left rotator cuff ovarian cyst pinning right ankle  Risk Factors The patient has history of HTN, HLD and PAD  She has no history of DVT or Recent Trauma  FINDINGS:  Segment  Right            Left              Impression       Impression       CFV      Normal (Patent)  Normal (Patent)     CONCLUSION:  Impression: RIGHT LOWER LIMB LIMITED: Evaluation shows no evidence of thrombus in the common femoral vein  Doppler evaluation shows a normal response to augmentation maneuvers  LEFT LOWER LIMB: No evidence of acute or chronic deep vein thrombosis No evidence of superficial thrombophlebitis noted  Doppler evaluation shows a normal response to augmentation maneuvers  Popliteal, posterior tibial and anterior tibial arterial Doppler waveforms are monophasic  (Patient has known arterial disease )  Tech Note: There is an echogenic structure located in the inguinal region  This structure measures approximately 2 3 cm and is consistent with enlarged lymph node and channels  Technical findings were given to Dr Britt Cobb answering service at 5:35pm   [employer]: Electronically Signed by: Samuel Rodriguez on 2020-07-01 08:26:51 PM    Ct Lower Extremity Wo Contrast Left    Result Date: 7/8/2020  Narrative: CT left hip without IV contrast INDICATION: M25 552: Pain in left hip  COMPARISON: Left hip radiograph 1/20/2019, left hip radiograph 4/26/2019, left hip MRI 3/27/2019  CT abdomen pelvis 2/10/2011 TECHNIQUE: CT examination of the left hip was performed  This examination, like all CT scans performed in the [facility], was performed utilizing techniques to minimize radiation dose exposure, including the use of iterative reconstruction and automated exposure control software    Sagittal and coronal two dimensional reconstructed images were also submitted for interpretation  Rad dose  421 mGy-cm FINDINGS: OSSEOUS STRUCTURES:  No fracture, dislocation or destructive osseous lesion  10 x 16 mm sclerotic lesion in the posterior left acetabulum unchanged from 2011 CT and likely represents a bone island  Mild degenerative changes of the left hip and pubic symphysis  VISUALIZED MUSCULATURE:  Unremarkable  SOFT TISSUES:  Scattered left inguinal lymph nodes measuring up to 11 mm short axis diameter  OTHER PERTINENT FINDINGS:  Incidental note of colonic diverticulosis  There is a calcified fibroid present within the uterus  Impression: No acute osseous abnormality  Mild degenerative changes left hip  Workstation performed: [id number]      Physical Exam   Constitutional: She is oriented to person, place, and time  She appears well-developed and well-nourished  Cardiovascular: Normal rate, regular rhythm, normal heart sounds and intact distal pulses  Pulmonary/Chest: Effort normal and breath sounds normal    Musculoskeletal: She exhibits edema and tenderness  Neurological: She is alert and oriented to person, place, and time  She has normal reflexes  Skin: Skin is warm  There is erythema  medial aspect of R ankle hot to touch, erythematous, swollen  Psychiatric: She has a normal mood and affect   Her behavior is normal  Judgment and thought content normal

## 2020-07-15 NOTE — PATIENT INSTRUCTIONS
1  B/L LE edema- Will apply compression stockings every morning, remove prior to bedtime  2  Gout of right ankle- Start tapering doses of Prednisone  This may affect your blood sugars  3  Urine is dark yellow, was previously treated for UTI, will send urine out for culture  4  Diabetes- A1c in office today was A1c 6 8- Remove all ice cream and eclairs etc from your home  Limit white potatoes, pasta, rice, bread  5  Hypertension at goal     Follow up with me as needed and with Dr Ksenia Mccauley as scheduled next month, labs prior

## 2020-07-16 LAB
AMORPH URATE CRY URNS QL MICRO: ABNORMAL /HPF
BACTERIA UR QL AUTO: ABNORMAL /HPF
BILIRUB UR QL STRIP: ABNORMAL
CLARITY UR: ABNORMAL
COLOR UR: ABNORMAL
GLUCOSE UR STRIP-MCNC: NEGATIVE MG/DL
HGB UR QL STRIP.AUTO: NEGATIVE
KETONES UR STRIP-MCNC: NEGATIVE MG/DL
LEUKOCYTE ESTERASE UR QL STRIP: ABNORMAL
NITRITE UR QL STRIP: NEGATIVE
NON-SQ EPI CELLS URNS QL MICRO: ABNORMAL /HPF
PH UR STRIP.AUTO: 5.5 [PH]
PROT UR STRIP-MCNC: ABNORMAL MG/DL
RBC #/AREA URNS AUTO: ABNORMAL /HPF
SP GR UR STRIP.AUTO: 1.02 (ref 1–1.03)
UROBILINOGEN UR QL STRIP.AUTO: 0.2 E.U./DL
WBC #/AREA URNS AUTO: ABNORMAL /HPF

## 2020-07-18 DIAGNOSIS — I73.9 CLAUDICATION IN PERIPHERAL VASCULAR DISEASE (HCC): ICD-10-CM

## 2020-07-18 DIAGNOSIS — I65.1 BASILAR ARTERY STENOSIS: ICD-10-CM

## 2020-07-18 DIAGNOSIS — Z86.73 HISTORY OF CVA (CEREBROVASCULAR ACCIDENT): ICD-10-CM

## 2020-07-18 LAB — BACTERIA UR CULT: ABNORMAL

## 2020-07-20 ENCOUNTER — TELEPHONE (OUTPATIENT)
Dept: INTERNAL MEDICINE CLINIC | Facility: CLINIC | Age: 75
End: 2020-07-20

## 2020-07-20 DIAGNOSIS — N39.0 URINARY TRACT INFECTION WITH HEMATURIA, SITE UNSPECIFIED: Primary | ICD-10-CM

## 2020-07-20 DIAGNOSIS — R31.9 URINARY TRACT INFECTION WITH HEMATURIA, SITE UNSPECIFIED: Primary | ICD-10-CM

## 2020-07-20 DIAGNOSIS — N39.0 URINARY TRACT INFECTION WITHOUT HEMATURIA, SITE UNSPECIFIED: ICD-10-CM

## 2020-07-20 RX ORDER — NITROFURANTOIN 25; 75 MG/1; MG/1
100 CAPSULE ORAL 2 TIMES DAILY
Qty: 10 CAPSULE | Refills: 0 | Status: SHIPPED | OUTPATIENT
Start: 2020-07-20 | End: 2020-07-25

## 2020-07-20 RX ORDER — CLOPIDOGREL BISULFATE 75 MG/1
TABLET ORAL
Qty: 90 TABLET | Refills: 1 | Status: SHIPPED | OUTPATIENT
Start: 2020-07-20 | End: 2021-01-20 | Stop reason: SDUPTHER

## 2020-07-20 NOTE — TELEPHONE ENCOUNTER
Pt aware          ----- Message from [place], [place] sent at 7/17/2020  9:51 AM EDT -----  Please call lab   Need sensitivity thanks

## 2020-07-21 DIAGNOSIS — N30.00 ACUTE CYSTITIS WITHOUT HEMATURIA: Primary | ICD-10-CM

## 2020-07-21 NOTE — PROGRESS NOTES
Lab Results   Component Value Date    WBC 7 0 06/17/2020    HGB 13 8 06/17/2020    HCT 39 6 06/17/2020     06/17/2020    TRIG 281 06/17/2020    HDL 42 06/17/2020    LDLDIRECT 198 06/17/2020    ALT 17 06/17/2020    AST 17 06/17/2020    K 4 3 06/17/2020     06/17/2020    CREATININE 1 61 06/17/2020    BUN 28 06/17/2020    CO2 21 06/17/2020    TSH 2 880 01/15/2020    INR 0 96 03/24/2020    GLUF 148 (H) 03/24/2020    HGBA1C 6 8 (A) 07/15/2020

## 2020-08-05 ENCOUNTER — OFFICE VISIT (OUTPATIENT)
Dept: INTERNAL MEDICINE CLINIC | Facility: CLINIC | Age: 75
End: 2020-08-05
Payer: COMMERCIAL

## 2020-08-05 VITALS
BODY MASS INDEX: 35.68 KG/M2 | RESPIRATION RATE: 16 BRPM | DIASTOLIC BLOOD PRESSURE: 80 MMHG | SYSTOLIC BLOOD PRESSURE: 160 MMHG | HEIGHT: 59 IN | OXYGEN SATURATION: 99 % | HEART RATE: 67 BPM | TEMPERATURE: 98.6 F | WEIGHT: 177 LBS

## 2020-08-05 DIAGNOSIS — E11.69 HYPERLIPIDEMIA ASSOCIATED WITH TYPE 2 DIABETES MELLITUS (HCC): ICD-10-CM

## 2020-08-05 DIAGNOSIS — E78.5 HYPERLIPIDEMIA ASSOCIATED WITH TYPE 2 DIABETES MELLITUS (HCC): ICD-10-CM

## 2020-08-05 DIAGNOSIS — I15.2 HYPERTENSION ASSOCIATED WITH DIABETES (HCC): ICD-10-CM

## 2020-08-05 DIAGNOSIS — I70.213 ATHEROSCLEROSIS OF NATIVE ARTERIES OF EXTREMITIES WITH INTERMITTENT CLAUDICATION, BILATERAL LEGS (HCC): ICD-10-CM

## 2020-08-05 DIAGNOSIS — E11.21 TYPE 2 DIABETES MELLITUS WITH DIABETIC NEPHROPATHY, WITHOUT LONG-TERM CURRENT USE OF INSULIN (HCC): Primary | ICD-10-CM

## 2020-08-05 DIAGNOSIS — F41.8 DEPRESSION WITH ANXIETY: ICD-10-CM

## 2020-08-05 DIAGNOSIS — E11.59 HYPERTENSION ASSOCIATED WITH DIABETES (HCC): ICD-10-CM

## 2020-08-05 DIAGNOSIS — Z72.0 TOBACCO ABUSE: ICD-10-CM

## 2020-08-05 DIAGNOSIS — I12.9 HYPERTENSIVE KIDNEY DISEASE WITH STAGE 3 CHRONIC KIDNEY DISEASE (HCC): ICD-10-CM

## 2020-08-05 DIAGNOSIS — N18.30 HYPERTENSIVE KIDNEY DISEASE WITH STAGE 3 CHRONIC KIDNEY DISEASE (HCC): ICD-10-CM

## 2020-08-05 DIAGNOSIS — I10 HYPERTENSION, UNSPECIFIED TYPE: ICD-10-CM

## 2020-08-05 DIAGNOSIS — R60.9 DEPENDENT EDEMA: ICD-10-CM

## 2020-08-05 DIAGNOSIS — K55.1 MESENTERIC ARTERY STENOSIS (HCC): ICD-10-CM

## 2020-08-05 DIAGNOSIS — N18.30 STAGE 3 CHRONIC KIDNEY DISEASE (HCC): ICD-10-CM

## 2020-08-05 DIAGNOSIS — I63.9 CEREBROVASCULAR ACCIDENT (CVA), UNSPECIFIED MECHANISM (HCC): ICD-10-CM

## 2020-08-05 PROCEDURE — 3008F BODY MASS INDEX DOCD: CPT | Performed by: INTERNAL MEDICINE

## 2020-08-05 PROCEDURE — 3044F HG A1C LEVEL LT 7.0%: CPT | Performed by: INTERNAL MEDICINE

## 2020-08-05 PROCEDURE — 4040F PNEUMOC VAC/ADMIN/RCVD: CPT | Performed by: INTERNAL MEDICINE

## 2020-08-05 PROCEDURE — 4010F ACE/ARB THERAPY RXD/TAKEN: CPT | Performed by: INTERNAL MEDICINE

## 2020-08-05 PROCEDURE — 3077F SYST BP >= 140 MM HG: CPT | Performed by: INTERNAL MEDICINE

## 2020-08-05 PROCEDURE — 3066F NEPHROPATHY DOC TX: CPT | Performed by: INTERNAL MEDICINE

## 2020-08-05 PROCEDURE — 1036F TOBACCO NON-USER: CPT | Performed by: INTERNAL MEDICINE

## 2020-08-05 PROCEDURE — 99215 OFFICE O/P EST HI 40 MIN: CPT | Performed by: INTERNAL MEDICINE

## 2020-08-05 PROCEDURE — 3079F DIAST BP 80-89 MM HG: CPT | Performed by: INTERNAL MEDICINE

## 2020-08-05 PROCEDURE — 1160F RVW MEDS BY RX/DR IN RCRD: CPT | Performed by: INTERNAL MEDICINE

## 2020-08-05 RX ORDER — BUPROPION HYDROCHLORIDE 75 MG/1
75 TABLET ORAL 2 TIMES DAILY
Qty: 90 TABLET | Refills: 2 | Status: SHIPPED | OUTPATIENT
Start: 2020-08-05 | End: 2020-09-14 | Stop reason: ALTCHOICE

## 2020-08-05 RX ORDER — CHLORTHALIDONE 25 MG/1
25 TABLET ORAL DAILY
Qty: 90 TABLET | Refills: 2 | Status: SHIPPED | OUTPATIENT
Start: 2020-08-05 | End: 2021-01-20 | Stop reason: SDUPTHER

## 2020-08-05 RX ORDER — CILOSTAZOL 50 MG/1
50 TABLET ORAL 2 TIMES DAILY
Qty: 60 TABLET | Refills: 5 | Status: SHIPPED | OUTPATIENT
Start: 2020-08-05 | End: 2020-09-01 | Stop reason: ALTCHOICE

## 2020-08-05 RX ORDER — AMLODIPINE BESYLATE 2.5 MG/1
2.5 TABLET ORAL DAILY
Qty: 90 TABLET | Refills: 2 | Status: SHIPPED | OUTPATIENT
Start: 2020-08-05 | End: 2021-01-20 | Stop reason: SDUPTHER

## 2020-08-05 NOTE — PATIENT INSTRUCTIONS
1 type 2 diabetes is at goal  2  Patient with dependent edema denies any shortness of breath or dyspnea on exertion which has changed will decrease amlodipine to 2 5 mg once a day add chlorthalidone 25 mg a day check renal panel in 1 week will see her back in 1 month  2  Hypertension decreased blood pressure on the left higher blood pressure on the right related to vascular disease strongly recommend tobacco cessation will Rx Wellbutrin to stop smoking in 2 weeks after on Wellbutrin can you continue to use her Nicotrol inhaler as needed  3  Chronic kidney disease has improved continue following with Dr Yamile Donovan will see her back in 1 month  4  Claudication which has been chronic can only walk approximately a block will Rx Pletal continue follow-up with vascular surgery  5   History of CVA will continue Plavix

## 2020-08-05 NOTE — PROGRESS NOTES
Assessment/Plan:      1 type 2 diabetes is at goal  2  Patient with dependent edema denies any shortness of breath or dyspnea on exertion which has changed will decrease amlodipine to 2 5 mg once a day add chlorthalidone 25 mg a day check renal panel in 1 week will see her back in 1 month  2  Hypertension decreased blood pressure on the left higher blood pressure on the right related to vascular disease strongly recommend tobacco cessation will Rx Wellbutrin to stop smoking in 2 weeks after on Wellbutrin can you continue to use her Nicotrol inhaler as needed  3  Chronic kidney disease has improved continue following with Dr Marisol Schneider will see her back in 1 month  4  Claudication which has been chronic can only walk approximately a block will Rx Pletal continue follow-up with vascular surgery  5  History of CVA will continue Plavix            Diagnoses and all orders for this visit:    Type 2 diabetes mellitus with diabetic nephropathy, without long-term current use of insulin (HCC)  -     CBC and differential; Future  -     Comprehensive metabolic panel; Future  -     LDL cholesterol, direct; Future  -     Lipid Panel with Direct LDL reflex; Future  -     Magnesium; Future  -     Microalbumin / creatinine urine ratio; Future  -     Protein / creatinine ratio, urine; Future  -     UA w Reflex to Microscopic w Reflex to Culture; Future  -     TSH, 3rd generation with Free T4 reflex; Future  -     Hemoglobin A1C; Future  -     Renal function panel; Future    Stage 3 chronic kidney disease (HCC)  -     CBC and differential; Future  -     Comprehensive metabolic panel; Future  -     LDL cholesterol, direct; Future  -     Lipid Panel with Direct LDL reflex; Future  -     Magnesium; Future  -     Microalbumin / creatinine urine ratio; Future  -     Protein / creatinine ratio, urine; Future  -     UA w Reflex to Microscopic w Reflex to Culture; Future  -     TSH, 3rd generation with Free T4 reflex;  Future  -     Hemoglobin A1C; Future  -     Renal function panel; Future    Hyperlipidemia associated with type 2 diabetes mellitus (HCC)  -     CBC and differential; Future  -     Comprehensive metabolic panel; Future  -     LDL cholesterol, direct; Future  -     Lipid Panel with Direct LDL reflex; Future  -     Magnesium; Future  -     Microalbumin / creatinine urine ratio; Future  -     Protein / creatinine ratio, urine; Future  -     UA w Reflex to Microscopic w Reflex to Culture; Future  -     TSH, 3rd generation with Free T4 reflex; Future  -     Hemoglobin A1C; Future  -     Renal function panel; Future    Hypertension, unspecified type  -     CBC and differential; Future  -     Comprehensive metabolic panel; Future  -     LDL cholesterol, direct; Future  -     Lipid Panel with Direct LDL reflex; Future  -     Magnesium; Future  -     Microalbumin / creatinine urine ratio; Future  -     Protein / creatinine ratio, urine; Future  -     UA w Reflex to Microscopic w Reflex to Culture; Future  -     TSH, 3rd generation with Free T4 reflex; Future  -     Hemoglobin A1C; Future  -     chlorthalidone 25 mg tablet; Take 1 tablet (25 mg total) by mouth daily  -     Renal function panel; Future    Depression with anxiety  -     buPROPion (WELLBUTRIN) 75 mg tablet; Take 1 tablet (75 mg total) by mouth 2 (two) times a day  -     CBC and differential; Future  -     Comprehensive metabolic panel; Future  -     LDL cholesterol, direct; Future  -     Lipid Panel with Direct LDL reflex; Future  -     Magnesium; Future  -     Microalbumin / creatinine urine ratio; Future  -     Protein / creatinine ratio, urine; Future  -     UA w Reflex to Microscopic w Reflex to Culture; Future  -     TSH, 3rd generation with Free T4 reflex; Future  -     Hemoglobin A1C; Future  -     Renal function panel; Future    Tobacco abuse  -     buPROPion (WELLBUTRIN) 75 mg tablet;  Take 1 tablet (75 mg total) by mouth 2 (two) times a day  -     CBC and differential; Future  -     Comprehensive metabolic panel; Future  -     LDL cholesterol, direct; Future  -     Lipid Panel with Direct LDL reflex; Future  -     Magnesium; Future  -     Microalbumin / creatinine urine ratio; Future  -     Protein / creatinine ratio, urine; Future  -     UA w Reflex to Microscopic w Reflex to Culture; Future  -     TSH, 3rd generation with Free T4 reflex; Future  -     Hemoglobin A1C; Future  -     Renal function panel; Future    Mesenteric artery stenosis (HCC)  -     CBC and differential; Future  -     Comprehensive metabolic panel; Future  -     LDL cholesterol, direct; Future  -     Lipid Panel with Direct LDL reflex; Future  -     Magnesium; Future  -     Microalbumin / creatinine urine ratio; Future  -     Protein / creatinine ratio, urine; Future  -     UA w Reflex to Microscopic w Reflex to Culture; Future  -     TSH, 3rd generation with Free T4 reflex; Future  -     Hemoglobin A1C; Future  -     Renal function panel; Future  -     Ambulatory referral to Vascular Surgery; Future    Hypertensive kidney disease with stage 3 chronic kidney disease (HCC)  -     CBC and differential; Future  -     Comprehensive metabolic panel; Future  -     LDL cholesterol, direct; Future  -     Lipid Panel with Direct LDL reflex; Future  -     Magnesium; Future  -     Microalbumin / creatinine urine ratio; Future  -     Protein / creatinine ratio, urine; Future  -     UA w Reflex to Microscopic w Reflex to Culture; Future  -     TSH, 3rd generation with Free T4 reflex; Future  -     Hemoglobin A1C; Future  -     Renal function panel; Future    Hypertension associated with diabetes ([facility] [address] )  -     CBC and differential; Future  -     Comprehensive metabolic panel; Future  -     LDL cholesterol, direct; Future  -     Lipid Panel with Direct LDL reflex; Future  -     Magnesium; Future  -     Microalbumin / creatinine urine ratio; Future  -     Protein / creatinine ratio, urine;  Future  -     UA w Reflex to Microscopic w Reflex to Culture; Future  -     TSH, 3rd generation with Free T4 reflex; Future  -     Hemoglobin A1C; Future  -     amLODIPine (NORVASC) 2 5 mg tablet; Take 1 tablet (2 5 mg total) by mouth daily  -     Renal function panel; Future    Atherosclerosis of native arteries of extremities with intermittent claudication, bilateral legs (HCC)  -     CBC and differential; Future  -     Comprehensive metabolic panel; Future  -     LDL cholesterol, direct; Future  -     Lipid Panel with Direct LDL reflex; Future  -     Magnesium; Future  -     Microalbumin / creatinine urine ratio; Future  -     Protein / creatinine ratio, urine; Future  -     UA w Reflex to Microscopic w Reflex to Culture; Future  -     TSH, 3rd generation with Free T4 reflex; Future  -     Hemoglobin A1C; Future  -     Renal function panel; Future  -     cilostazol (PLETAL) 50 mg tablet; Take 1 tablet (50 mg total) by mouth 2 (two) times a day  -     Ambulatory referral to Vascular Surgery; Future    Dependent edema  -     CBC and differential; Future  -     Comprehensive metabolic panel; Future  -     LDL cholesterol, direct; Future  -     Lipid Panel with Direct LDL reflex; Future  -     Magnesium; Future  -     Microalbumin / creatinine urine ratio; Future  -     Protein / creatinine ratio, urine; Future  -     UA w Reflex to Microscopic w Reflex to Culture; Future  -     TSH, 3rd generation with Free T4 reflex; Future  -     Hemoglobin A1C; Future  -     chlorthalidone 25 mg tablet; Take 1 tablet (25 mg total) by mouth daily  -     Renal function panel; Future    Cerebrovascular accident (CVA), unspecified mechanism ([facility] [address] )        The patient was counseled regarding instructions for management, risk factor reductions, patient and family education,impressions, risks and benefits of treatment options, side effects of medications, importance of compliance with treatment   The treatment plan was reviewed with the patient/guardian and patient/guardian understands and agrees with the treatment plan  Patient's shoes and socks removed  Right Foot/Ankle   Right Foot Inspection  Skin Exam: skin normal and skin intact no dry skin, no warmth, no callus, no erythema, no maceration, no abnormal color, no pre-ulcer, no ulcer and no callus                          Toe Exam: no swelling, no tenderness, erythema and  no right toe deformity  Sensory   Vibration: intact  Proprioception: intact   Monofilament testing: intact  Vascular    The right DP pulse is 1+  The right PT pulse is 1+  Right Toe  - Comprehensive Exam  Ecchymosis: none  Swelling: none   Tenderness: none         Left Foot/Ankle  Left Foot Inspection  Skin Exam: skin normal and skin intactno dry skin, no warmth, no erythema, no maceration, normal color, no pre-ulcer, no ulcer and no callus                         Toe Exam: no swelling, no tenderness, no erythema and no left toe deformity                   Sensory   Vibration: diminished  Proprioception: intact  Monofilament: diminished  Vascular    The left DP pulse is 1+  The left PT pulse is 1+  Left Toe  - Comprehensive Exam  Ecchymosis: none  Swelling: none   Tenderness: none       Assign Risk Category:  No deformity present;  No loss of protective sensation; Weak pulses       Risk: 1        Current Outpatient Medications:     amLODIPine (NORVASC) 2 5 mg tablet, Take 1 tablet (2 5 mg total) by mouth daily, Disp: 90 tablet, Rfl: 2    cloNIDine (CATAPRES-TTS-3) 0 3 mg/24 hr, Place 1 patch (0 3 mg total) on the skin once a week, Disp: 12 patch, Rfl: 2    clopidogrel (PLAVIX) 75 mg tablet, TAKE 1 TABLET BY MOUTH EVERY DAY, Disp: 90 tablet, Rfl: 1    Icosapent Ethyl (VASCEPA) 1 g CAPS, 2 cap bid with meals, Disp: 360 capsule, Rfl: 2    labetalol (NORMODYNE) 300 mg tablet, Take 1 tablet (300 mg total) by mouth 2 (two) times a day, Disp: 270 tablet, Rfl: 2    losartan (COZAAR) 100 MG tablet, Take 1 tablet (100 mg total) by mouth every morning, Disp: 90 tablet, Rfl: 2    buPROPion (WELLBUTRIN) 75 mg tablet, Take 1 tablet (75 mg total) by mouth 2 (two) times a day, Disp: 90 tablet, Rfl: 2    chlorthalidone 25 mg tablet, Take 1 tablet (25 mg total) by mouth daily, Disp: 90 tablet, Rfl: 2    cilostazol (PLETAL) 50 mg tablet, Take 1 tablet (50 mg total) by mouth 2 (two) times a day, Disp: 60 tablet, Rfl: 5    Subjective:      Patient ID: Melanie Saab is a 70 y o  female  HPI    The following portions of the patient's history were reviewed and updated as appropriate:   She has a past medical history of Arthritis, Chronic kidney disease, Coronary artery disease, Diabetes mellitus ([address] ), Endometriosis, High cholesterol, Hypertension, Kidney disease, chronic, stage III (moderate, EGFR 30-59 ml/min) ([address] ), Lumbar disc herniation, Neuropathy, Peripheral vascular disease ([address] ), Pneumonia, Shoulder injury, Spinal stenosis, and Stroke ([facility] [address] ) (2015)  ,  does not have any pertinent problems on file  ,   has a past surgical history that includes Rotator cuff repair (Left); Tonsillectomy; Ovarian cyst surgery; Fracture surgery (Right); Colonoscopy; Cardiac catheterization; [place] retrograde pyelogram (4/6/2020); pr cystourethroscopy,fulgur 0 5-2 cm lesn (N/A, 4/6/2020); pr cystourethroscopy,ureter catheter (Bilateral, 4/6/2020); and pr cystoscopy,insert ureteral stent (Right, 4/6/2020)  ,  family history includes Arthritis in her brother; Heart defect in her father; Heart disease in her mother; Hyperlipidemia in her mother; Hypertension in her father and mother; Other in her brother; Stroke in her sister  ,   reports that she has been smoking cigarettes  She has been smoking about 0 00 packs per day  She has never used smokeless tobacco  She reports that she does not drink alcohol or use drugs  ,  is allergic to fenofibrate; atorvastatin; colesevelam; colestipol; ezetimibe; pollen extract; rosuvastatin; and statins       Review of Systems   Constitutional: Negative for appetite change, chills, fatigue, fever and unexpected weight change  HENT: Negative for congestion, ear pain, facial swelling, hearing loss, mouth sores, nosebleeds, postnasal drip, rhinorrhea, sinus pain, sore throat, trouble swallowing and voice change  Eyes: Negative for pain, discharge, redness and visual disturbance  Respiratory: Negative for apnea, chest tightness, shortness of breath, wheezing and stridor  Cardiovascular: Negative for chest pain, palpitations and leg swelling  Gastrointestinal: Negative for abdominal distention, abdominal pain, blood in stool, constipation, diarrhea and vomiting  Endocrine: Negative for cold intolerance, heat intolerance, polydipsia, polyphagia and polyuria  Genitourinary: Negative for difficulty urinating, dysuria, flank pain, frequency, genital sores, hematuria and urgency  Musculoskeletal: Negative for arthralgias and back pain  Skin: Negative for rash and wound  Allergic/Immunologic: Negative for environmental allergies, food allergies and immunocompromised state  Neurological: Negative for dizziness, tremors, seizures, syncope, facial asymmetry, speech difficulty, weakness, light-headedness, numbness and headaches  Hematological: Negative for adenopathy  Does not bruise/bleed easily  Psychiatric/Behavioral: Negative for agitation, behavioral problems, dysphoric mood, hallucinations, self-injury, sleep disturbance and suicidal ideas  The patient is not hyperactive            Objective:  /80 (BP Location: Right arm)   Pulse 67   Temp 98 6 °F (37 °C)   Resp 16   Ht 4' 11"   Wt 80 3 kg (177 lb)   LMP  (LMP Unknown)   SpO2 99%   BMI 35 75 kg/m²     Lab Review  Billing Encounter on 07/15/2020   Component Date Value    Color, UA 07/15/2020 Dk Yellow     Clarity, UA 07/15/2020 Cloudy     Specific Gravity, UA 07/15/2020 1 024     pH, UA 07/15/2020 5 5     Leukocytes, UA 07/15/2020 Large*    Nitrite, UA 07/15/2020 Negative     Protein, UA 07/15/2020 100 (2+)*    Glucose, UA 07/15/2020 Negative     Ketones, UA 07/15/2020 Negative     Urobilinogen, UA 07/15/2020 0 2     Bilirubin, UA 07/15/2020 Interference- unable to analyze*    Blood, UA 07/15/2020 Negative     RBC, UA 07/15/2020 None Seen     WBC, UA 07/15/2020 30-50*    Epithelial Cells 07/15/2020 Occasional     Bacteria, UA 07/15/2020 Occasional     AMORPH URATES 07/15/2020 Occasional     Urine Culture 07/15/2020 70,000-79,000 cfu/ml Citrobacter freundii*   Office Visit on 07/15/2020   Component Date Value    LEUKOCYTE ESTERASE,UA 07/15/2020 2+     NITRITE,UA 07/15/2020 nit+     SL AMB POCT UROBILINOGEN 07/15/2020 0 2     POCT URINE PROTEIN 07/15/2020 neg      PH,UA 07/15/2020 5 0     BLOOD,UA 07/15/2020 neg     SPECIFIC GRAVITY,UA 07/15/2020 1 030     KETONES,UA 07/15/2020 neg     BILIRUBIN,UA 07/15/2020 neg     GLUCOSE, UA 07/15/2020 neg      COLOR,UA 07/15/2020 dark yellow     CLARITY,UA 07/15/2020 cloudy     Hemoglobin A1C 07/15/2020 6 8*   Orders Only on 06/17/2020   Component Date Value    White Blood Cell Count 06/17/2020 7 0     Red Blood Cell Count 06/17/2020 4 60     Hemoglobin 06/17/2020 13 8     HCT 06/17/2020 39 6     MCV 06/17/2020 86     MCH 06/17/2020 30 0     MCHC 06/17/2020 34 8     RDW 06/17/2020 13 2     Platelet Count 37/86/4391 224     Neutrophils 06/17/2020 59     Lymphocytes 06/17/2020 26     Monocytes 06/17/2020 9     Eosinophils 06/17/2020 4     Basophils PCT 06/17/2020 1     Neutrophils (Absolute) 06/17/2020 4 1     Lymphocytes (Absolute) 06/17/2020 1 8     Monocytes (Absolute) 06/17/2020 0 6     Eosinophils (Absolute) 06/17/2020 0 3     Basophils ABS 06/17/2020 0 1     Immature Granulocytes 06/17/2020 1     Immature Granulocytes (A* 06/17/2020 0 1     Glucose, Random 06/17/2020 145*    BUN 06/17/2020 28     Creatinine 06/17/2020 1 61     eGFR Non  06/17/2020 CANCELED     eGFR  06/17/2020 CANCELED     SL AMB BUN/CREATININE RA* 06/17/2020 17     Sodium 06/17/2020 140     Potassium 06/17/2020 4 3     Chloride 06/17/2020 101     CO2 06/17/2020 21     CALCIUM 06/17/2020 9 6     Protein, Total 06/17/2020 7 0     Albumin 06/17/2020 4 4     Globulin, Total 06/17/2020 2 6     Albumin/Globulin Ratio 06/17/2020 1 7     TOTAL BILIRUBIN 06/17/2020 0 3     Alk Phos Isoenzymes 06/17/2020 87     AST 06/17/2020 17     ALT 06/17/2020 17     Cholesterol, Total 06/17/2020 299     Triglycerides 06/17/2020 281     HDL 06/17/2020 42     VLDL Cholesterol Calcula* 06/17/2020 56*    LDL Calculated 06/17/2020 201     Comment 06/17/2020 Comment     Creatinine, Urine 06/17/2020 97 1     Microalbum  ,U,Random 06/17/2020 473 4     Microalb/Creat Ratio 06/17/2020 488*    LDL Direct 06/17/2020 198     Uric Acid 06/17/2020 8 1     Magnesium, Serum 06/17/2020 2 0    Orders Only on 06/16/2020   Component Date Value    PTH, Intact 06/16/2020 86*   Orders Only on 06/16/2020   Component Date Value    White Blood Cell Count 06/16/2020 7 2     Red Blood Cell Count 06/16/2020 4 77     Hemoglobin 06/16/2020 13 8     HCT 06/16/2020 41 5     MCV 06/16/2020 87     MCH 06/16/2020 28 9     MCHC 06/16/2020 33 3     RDW 06/16/2020 13 0     Platelet Count 75/19/6953 247     Neutrophils 06/16/2020 59     Lymphocytes 06/16/2020 25     Monocytes 06/16/2020 10     Eosinophils 06/16/2020 4     Basophils PCT 06/16/2020 1     Neutrophils (Absolute) 06/16/2020 4 3     Lymphocytes (Absolute) 06/16/2020 1 8     Monocytes (Absolute) 06/16/2020 0 7     Eosinophils (Absolute) 06/16/2020 0 3     Basophils ABS 06/16/2020 0 1     Immature Granulocytes 06/16/2020 1     Immature Granulocytes (A* 06/16/2020 0 1     Specific Gravity 06/16/2020 1 016     Ph 06/16/2020 6 0     Color UA 06/16/2020 Yellow     Urine Appearance 06/16/2020 Cloudy*    Leukocyte Esterase 06/16/2020 2+*    Protein 06/16/2020 2+*    Glucose, 24 HR Urine 06/16/2020 Negative     Ketone, Urine 06/16/2020 Negative     Blood, Urine 06/16/2020 Negative     Bilirubin, Urine 06/16/2020 Negative     Urobilinogen Urine 06/16/2020 0 2     SL AMB NITRITES URINE, Q* 06/16/2020 Positive*    Microscopic Examination 06/16/2020 See below:     SL AMB WBC, URINE 06/16/2020 11-30*    RBC, Urine 06/16/2020 0-2     Epithelial Cells (non re* 06/16/2020 0-10     Bacteria, Urine 06/16/2020 Few     Glucose, Random 06/16/2020 146*    BUN 06/16/2020 29*    Creatinine 06/16/2020 1 58*    eGFR Non  06/16/2020 32*    eGFR  06/16/2020 37*    SL AMB BUN/CREATININE RA* 06/16/2020 18     Sodium 06/16/2020 140     Potassium 06/16/2020 4 7     Chloride 06/16/2020 102     CO2 06/16/2020 22     CALCIUM 06/16/2020 9 8     Creatinine, Urine 06/16/2020 91 8     Microalbum  ,U,Random 06/16/2020 474 3     Microalb/Creat Ratio 06/16/2020 517*    Uric Acid 06/16/2020 8 1*    Phosphorus, Serum 06/16/2020 3 7    Admission on 04/06/2020, Discharged on 04/06/2020   Component Date Value    Case Report 04/06/2020                      Value:Surgical Pathology Report                         Case: [id number]                                   Authorizing Provider:  Volodymyr Cesar MD        Collected:           04/06/2020 0847              Ordering Location:     [facility]        Received:            04/06/2020 [id number] [facility]                                                      Pathologist:           Landon Pelaez MD                                                        Specimen:    Urinary Bladder, bladder tumor right trigonal                                              Final Diagnosis 04/06/2020                      Value: This result contains rich text formatting which cannot be displayed here      Additional Information 04/06/2020 Value:This result contains rich text formatting which cannot be displayed here  [facility] Description 04/06/2020                      Value: This result contains rich text formatting which cannot be displayed here     Office Visit on 03/24/2020   Component Date Value    Ventricular Rate 03/24/2020 63     Atrial Rate 03/24/2020 63     MA Interval 03/24/2020 170     QRSD Interval 03/24/2020 80     QT Interval 03/24/2020 422     QTC Interval 03/24/2020 431     P Axis 03/24/2020 53     QRS Axis 03/24/2020 -10     T Wave Axis 03/24/2020 108    Appointment on 03/24/2020   Component Date Value    ABO Grouping 03/24/2020 A     Rh Factor 03/24/2020 Negative     Antibody Screen 03/24/2020 Negative     Specimen Expiration Date 03/24/2020 [id number]     PTT 03/24/2020 34     Protime 03/24/2020 12 7     INR 03/24/2020 0 96     WBC 03/24/2020 7 11     RBC 03/24/2020 4 94     Hemoglobin 03/24/2020 14 1     Hematocrit 03/24/2020 44 7     MCV 03/24/2020 91     MCH 03/24/2020 28 5     MCHC 03/24/2020 31 5     RDW 03/24/2020 13 2     MPV 03/24/2020 10 4     Platelets 95/00/5678 210     nRBC 03/24/2020 0     Neutrophils Relative 03/24/2020 64     Immat GRANS % 03/24/2020 1     Lymphocytes Relative 03/24/2020 21     Monocytes Relative 03/24/2020 8     Eosinophils Relative 03/24/2020 5     Basophils Relative 03/24/2020 1     Neutrophils Absolute 03/24/2020 4 58     Immature Grans Absolute 03/24/2020 0 07     Lymphocytes Absolute 03/24/2020 1 48     Monocytes Absolute 03/24/2020 0 58     Eosinophils Absolute 03/24/2020 0 34     Basophils Absolute 03/24/2020 0 06     Sodium 03/24/2020 143     Potassium 03/24/2020 4 6     Chloride 03/24/2020 106     CO2 03/24/2020 27     ANION GAP 03/24/2020 10     BUN 03/24/2020 30*    Creatinine 03/24/2020 1 71*    Glucose, Fasting 03/24/2020 148*    Calcium 03/24/2020 9 7     eGFR 03/24/2020 29     Urine Culture 03/24/2020 50,000-59,000 cfu/ml Escherichia [facility] Outpatient Visit on 02/26/2020   Component Date Value    Sodium 02/26/2020 137     Potassium 02/26/2020 4 7     Chloride 02/26/2020 103     CO2 02/26/2020 22     ANION GAP 02/26/2020 12     BUN 02/26/2020 35*    Creatinine 02/26/2020 1 59*    Glucose 02/26/2020 144*    Glucose, Fasting 02/26/2020 144*    Calcium 02/26/2020 9 4     eGFR 02/26/2020 32     POC Glucose 02/26/2020 145*   Orders Only on 02/10/2020   Component Date Value    White Blood Cell Count 02/10/2020 8 5     Red Blood Cell Count 02/10/2020 4 94     Hemoglobin 02/10/2020 14 7     HCT 02/10/2020 42 6     MCV 02/10/2020 86     MCH 02/10/2020 29 8     MCHC 02/10/2020 34 5     RDW 02/10/2020 13 8     Platelet Count 67/79/0369 214     Neutrophils 02/10/2020 64     Lymphocytes 02/10/2020 23     Monocytes 02/10/2020 7     Eosinophils 02/10/2020 4     Basophils PCT 02/10/2020 1     Neutrophils (Absolute) 02/10/2020 5 5     Lymphocytes (Absolute) 02/10/2020 1 9     Monocytes (Absolute) 02/10/2020 0 6     Eosinophils (Absolute) 02/10/2020 0 3     Basophils ABS 02/10/2020 0 0     Immature Granulocytes 02/10/2020 1     Immature Granulocytes (A* 02/10/2020 0 1     Glucose, Random 02/10/2020 140*    BUN 02/10/2020 26     Creatinine 02/10/2020 1 50*    eGFR Non African American 02/10/2020 35*    eGFR  02/10/2020 40*    SL AMB BUN/CREATININE RA* 02/10/2020 17     Sodium 02/10/2020 139     Potassium 02/10/2020 4 3     Chloride 02/10/2020 101     CO2 02/10/2020 21     CALCIUM 02/10/2020 9 7     Protein, Total 02/10/2020 6 6     Albumin 02/10/2020 4 2     Globulin, Total 02/10/2020 2 4     Albumin/Globulin Ratio 02/10/2020 1 8     TOTAL BILIRUBIN 02/10/2020 0 3     Alk Phos Isoenzymes 02/10/2020 94     AST 02/10/2020 13     ALT 02/10/2020 12     INR 02/10/2020 1 0     Prothrombin Time 02/10/2020 10 4    There may be more visits with results that are not included  Imaging  @[id number]@  Recent Results (from the past 81117 hour(s))   ECG 12 lead   Result Value    Ventricular Rate 63    Atrial Rate 63    TX Interval 170    QRSD Interval 80    QT Interval 422    QTC Interval 431    P Axis 53    QRS Axis -10    T Wave Axis 108    Narrative    Normal sinus rhythm  Left ventricular hypertrophy with repolarization abnormality  Anteroseptal infarct (cited on or before 2019)  Cannot rule out Inferior infarct  When compared with ECG of 2019 10:49,  No significant change was found  Confirmed by Patricia Harley ([id number]) on 3/24/2020 1:04:57 PM   NM myocardial perfusion spect (rx stress and/or rest)    Narrative    [address] [place] [address] 89 [phone]    Rest/Stress Gated SPECT Myocardial Perfusion Imaging After Regadenoson    Patient: Heena Thompson  MR number: [id number]  Account number: [de-identified]  : 1945  Age: 69 years  Gender: Female  Status: Outpatient  Location: Stress lab  Height: 58 in  Weight: 163 lb  BP: 206/ 84 mmHg    Allergies: ATORVASTATIN, COLESEVELAM, COLESTIPOL, EZETIMIBE, FENOFIBRATE, POLLEN EXTRACT, ROSUVASTATIN, STATINS    Diagnosis: R94 31 - Abnormal electrocardiogram [ECG] [EKG]    Primary Physician:  Hodan Fitzgerald DO  Referring Physician:  Hodan Fitzgerald DO  Group:  [facility]  Other:  Tiffany Henriquez MS, CCT  Interpreting Physician:  Pa Saleh MD    INDICATIONS: Detection of coronary artery disease  Pre-operative risk assessment  HISTORY: The patient is a 69year old  female  Chest pain status: no chest pain  Coronary artery disease risk factors: dyslipidemia, hypertension, smoking, diabetes mellitus, and post-menopausal state  Cardiovascular history:  peripheral vascular occlusive disease, stroke, and transient ischemic attack  Co-morbidity: history of renal disease and obesity  GERD, renal and celiac stenosis   Medications: a beta blocker, a calcium channel blocker, an ACE  inhibitor/ARB, clopidogrel, and an antihypertensive agent  Previous test results: abnormal ECG and hyperlipidemia  PHYSICAL EXAM: Baseline physical exam screening: normal     REST ECG: Normal sinus rhythm  Nonspecific ST and T wave abnormalities were present  PROCEDURE: The study was performed in the the Stress lab  A regadenoson infusion pharmacologic stress test was performed  Gated SPECT myocardial perfusion imaging was performed after stress and at rest  Systolic blood pressure was 206  mmHg, at the start of the study  Diastolic blood pressure was 84 mmHg, at the start of the study  The heart rate was 63 bpm, at the start of the study  Regadenoson protocol:  HR bpm SBP mmHg DBP mmHg Symptoms Rhythm/conduct  Baseline 63 206 84 none NSR, no ectopy  Immediate 83 126 84 mild dyspnea, nausea NSR, no ectopy  1 min 76 -- -- none NSR, no ectopy  2 min 71 184 78 none NSR, no ectopy  Rest SpO2 98, Peak Stress SpO2 99    STRESS SUMMARY: Duration of pharmacologic stress was 3 min  Maximal heart rate during stress was 83 bpm  The rate-pressure product for the peak heart rate and blood pressure was 50100  There was no chest pain during stress  The stress test  was terminated due to protocol completion  The stress ECG was negative for ischemia and normal     ISOTOPE ADMINISTRATION:  Resting isotope administration Stress isotope administration  Agent Tetrofosmin Tetrofosmin  Dose 9 9 mCi 29 6 mCi  Date 11/08/2019 11/08/2019  Injection-image interval 30 min 30 min    The radiopharmaceutical was injected at the peak effect of pharmacologic stress  MYOCARDIAL PERFUSION IMAGING:  The image quality was good  PERFUSION DEFECTS:  -  There was a small, mildly severe, partially reversible myocardial perfusion defect of anterior and inferior wall  -  There was of the inferior and anterior wall  GATED SPECT:  The calculated left ventricular ejection fraction was 61 %   There was no left ventricular regional abnormality  SUMMARY:  -  Stress results: There was no chest pain during stress  -  ECG conclusions: The stress ECG was negative for ischemia and normal   -  Perfusion imaging: There was a small, mildly severe, partially reversible myocardial perfusion defect of anterior and inferior wall There was of the inferior and anterior wall  -  Gated SPECT: The calculated left ventricular ejection fraction was 61 %  There was no left ventricular regional abnormality  Prepared and signed by    Tanvir Pace MD  Signed 2019 17:53:08     Echo complete with contrast if indicated    Narrative    [address]  [place], [place] [address]  [phone]    Transthoracic Echocardiogram  2D, M-mode, Doppler, and Color Doppler    Study date:  2019    Patient: Milena Pat  MR number: [id number]  Account number: [de-identified]  : 15-Juma-1945  Age: 69 years  Gender: Female  Status: Outpatient  Location: Echo lab  Height: 58 in  Weight: 163 lb  BP: 216/ 86 mmHg    Indications: Abnormal EKG, Assess left ventricular function  Diagnoses: R94 31 - Abnormal electrocardiogram [ECG] [EKG]    Sonographer:   [facility] Avani Bates  Referring Physician:  Lona Martin DO  Group:  [facility]  Interpreting Physician:  Tanvir Pace MD    SUMMARY    LEFT VENTRICLE:  Systolic function was normal  Ejection fraction was estimated to be 60 %  There were no regional wall motion abnormalities  Wall thickness was mildly increased  Doppler parameters were consistent with abnormal left ventricular relaxation (grade 1 diastolic dysfunction)  Doppler parameters were consistent with elevated ventricular end-diastolic filling pressure  LEFT ATRIUM:  The atrium was mildly dilated  MITRAL VALVE:  There was mild annular calcification  There was mild regurgitation  TRICUSPID VALVE:  There was trace regurgitation      AORTA:  The root exhibited normal size and mild fibrocalcific change  HISTORY: Consistent with transient ischemic attack or stroke  PRIOR HISTORY: CKD, Risk factors: hypertension, diabetes, and hypercholesterolemia  PROCEDURE: The procedure was performed in the echo lab  This was a routine study  The transthoracic approach was used  The study included complete 2D imaging, M-mode, complete spectral Doppler, and color Doppler  The heart rate was 60 bpm,  at the start of the study  Images were obtained from the parasternal, apical, subcostal, and suprasternal notch acoustic windows  Image quality was adequate  LEFT VENTRICLE: Size was normal  Systolic function was normal  Ejection fraction was estimated to be 60 %  There were no regional wall motion abnormalities  Wall thickness was mildly increased  No evidence of apical thrombus  DOPPLER:  Doppler parameters were consistent with abnormal left ventricular relaxation (grade 1 diastolic dysfunction)  Doppler parameters were consistent with elevated ventricular end-diastolic filling pressure  RIGHT VENTRICLE: The size was normal  Systolic function was normal  Wall thickness was normal     LEFT ATRIUM: The atrium was mildly dilated  RIGHT ATRIUM: Size was normal     MITRAL VALVE: There was mild annular calcification  Valve structure was normal  There was mild-moderate calcification  There was mildly reduced leaflet separation  DOPPLER: The transmitral velocity was within the normal range  There was no  evidence for stenosis  There was mild regurgitation  AORTIC VALVE: The valve was trileaflet  Leaflets exhibited normal thickness and normal cuspal separation  DOPPLER: Transaortic velocity was within the normal range  There was no evidence for stenosis  There was no significant  regurgitation  TRICUSPID VALVE: The valve structure was normal  There was normal leaflet separation  DOPPLER: The transtricuspid velocity was within the normal range   There was no evidence for stenosis  There was trace regurgitation  PULMONIC VALVE: Leaflets exhibited normal thickness, no calcification, and normal cuspal separation  DOPPLER: The transpulmonic velocity was within the normal range  There was no significant regurgitation  PERICARDIUM: There was no pericardial effusion  The pericardium was normal in appearance  AORTA: The root exhibited normal size and mild fibrocalcific change  SYSTEMIC VEINS: IVC: The inferior vena cava was normal in size  SYSTEM MEASUREMENT TABLES    2D  %FS: 30 9 %  Ao Diam: 2 7 cm  EDV(Teich): 80 7 ml  EF(Teich): 58 9 %  ESV(Teich): 33 1 ml  IVSd: 1 1 cm  LA Area: 17 cm2  LA Diam: 3 9 cm  LVEDV MOD A4C: 115 5 ml  LVEF MOD A4C: 60 5 %  LVESV MOD A4C: 45 6 ml  LVIDd: 4 2 cm  LVIDs: 2 9 cm  LVLd A4C: 8 cm  LVLs A4C: 6 7 cm  LVPWd: 1 1 cm  RA Area: 15 1 cm2  RVIDd: 2 9 cm  SV MOD A4C: 69 9 ml  SV(Teich): 47 5 ml    MM  TAPSE: 2 1 cm    PW  E': 0 m/s  E/E': 27 9  MV A Carlos A: 1 5 m/s  MV Dec [place]: 3 4 m/s2  MV DecT: 293 8 ms  MV E Carlos A: 1 m/s  MV E/A Ratio: 0 7  MV PHT: 85 2 ms  MVA By PHT: 2 6 cm2    [employer] Accredited Echocardi[facility]    Prepared and electronically signed by    So Spaulding MD  Signed [id number] 17:32:58       No orders to display     No results found for this or any previous visit  Physical Exam   Constitutional: She is oriented to person, place, and time  She appears well-developed  HENT:   Right Ear: External ear normal    Left Ear: External ear normal    Eyes: Right eye exhibits no discharge  Left eye exhibits no discharge  No scleral icterus  Neck: Carotid bruit is not present  No tracheal deviation present  No thyroid mass and no thyromegaly present  Cardiovascular: Normal rate, regular rhythm and normal heart sounds  Exam reveals no gallop and no friction rub  Pulses are weak pulses  No murmur heard  Pulses:       Dorsalis pedis pulses are 1+ on the right side and 1+ on the left side  Posterior tibial pulses are 1+ on the right side and 1+ on the left side  Pulmonary/Chest: No respiratory distress  She has no wheezes  She has no rales  Musculoskeletal:      Right lower leg: Edema present  Left lower leg: Edema present  Feet:   Right Foot:   Skin Integrity: Negative for ulcer, skin breakdown, erythema, warmth, callus or dry skin  Left Foot:   Skin Integrity: Negative for ulcer, skin breakdown, erythema, warmth, callus or dry skin  Lymphadenopathy:     She has no cervical adenopathy  Neurological: She is alert and oriented to person, place, and time  Coordination normal    Psychiatric: Her behavior is normal  Judgment and thought content normal    Nursing note and vitals reviewed

## 2020-08-05 NOTE — LETTER
August 5, 2020     Paulden Radha, [address] [facility]    Patient: Ephraim Pacheco   YOB: 1945   Date of Visit: 8/5/2020       Dear Dr Osmani Canchola: Thank you for referring Ashu Linn to me for evaluation  Below are my notes for this consultation  If you have questions, please do not hesitate to call me  I look forward to following your patient along with you  Sincerely,        Roberto Castro DO        CC: No Recipients  Roberto Castro DO  8/5/2020  9:45 AM  Sign when Signing Visit  Assessment/Plan:      1 type 2 diabetes is at goal  2  Patient with dependent edema denies any shortness of breath or dyspnea on exertion which has changed will decrease amlodipine to 2 5 mg once a day add chlorthalidone 25 mg a day check renal panel in 1 week will see her back in 1 month  2  Hypertension decreased blood pressure on the left higher blood pressure on the right related to vascular disease strongly recommend tobacco cessation will Rx Wellbutrin to stop smoking in 2 weeks after on Wellbutrin can you continue to use her Nicotrol inhaler as needed  3  Chronic kidney disease has improved continue following with Dr Osmani Canchola will see her back in 1 month  4  Claudication which has been chronic can only walk approximately a block will Rx Pletal continue follow-up with vascular surgery  5  History of CVA will continue Plavix            Diagnoses and all orders for this visit:    Type 2 diabetes mellitus with diabetic nephropathy, without long-term current use of insulin (HCC)  -     CBC and differential; Future  -     Comprehensive metabolic panel; Future  -     LDL cholesterol, direct; Future  -     Lipid Panel with Direct LDL reflex; Future  -     Magnesium; Future  -     Microalbumin / creatinine urine ratio; Future  -     Protein / creatinine ratio, urine; Future  -     UA w Reflex to Microscopic w Reflex to Culture;  Future  -     TSH, 3rd generation with Free T4 reflex; Future  -     Hemoglobin A1C; Future  -     Renal function panel; Future    Stage 3 chronic kidney disease (HCC)  -     CBC and differential; Future  -     Comprehensive metabolic panel; Future  -     LDL cholesterol, direct; Future  -     Lipid Panel with Direct LDL reflex; Future  -     Magnesium; Future  -     Microalbumin / creatinine urine ratio; Future  -     Protein / creatinine ratio, urine; Future  -     UA w Reflex to Microscopic w Reflex to Culture; Future  -     TSH, 3rd generation with Free T4 reflex; Future  -     Hemoglobin A1C; Future  -     Renal function panel; Future    Hyperlipidemia associated with type 2 diabetes mellitus (HCC)  -     CBC and differential; Future  -     Comprehensive metabolic panel; Future  -     LDL cholesterol, direct; Future  -     Lipid Panel with Direct LDL reflex; Future  -     Magnesium; Future  -     Microalbumin / creatinine urine ratio; Future  -     Protein / creatinine ratio, urine; Future  -     UA w Reflex to Microscopic w Reflex to Culture; Future  -     TSH, 3rd generation with Free T4 reflex; Future  -     Hemoglobin A1C; Future  -     Renal function panel; Future    Hypertension, unspecified type  -     CBC and differential; Future  -     Comprehensive metabolic panel; Future  -     LDL cholesterol, direct; Future  -     Lipid Panel with Direct LDL reflex; Future  -     Magnesium; Future  -     Microalbumin / creatinine urine ratio; Future  -     Protein / creatinine ratio, urine; Future  -     UA w Reflex to Microscopic w Reflex to Culture; Future  -     TSH, 3rd generation with Free T4 reflex; Future  -     Hemoglobin A1C; Future  -     chlorthalidone 25 mg tablet; Take 1 tablet (25 mg total) by mouth daily  -     Renal function panel; Future    Depression with anxiety  -     buPROPion (WELLBUTRIN) 75 mg tablet; Take 1 tablet (75 mg total) by mouth 2 (two) times a day  -     CBC and differential; Future  -     Comprehensive metabolic panel;  Future  - LDL cholesterol, direct; Future  -     Lipid Panel with Direct LDL reflex; Future  -     Magnesium; Future  -     Microalbumin / creatinine urine ratio; Future  -     Protein / creatinine ratio, urine; Future  -     UA w Reflex to Microscopic w Reflex to Culture; Future  -     TSH, 3rd generation with Free T4 reflex; Future  -     Hemoglobin A1C; Future  -     Renal function panel; Future    Tobacco abuse  -     buPROPion (WELLBUTRIN) 75 mg tablet; Take 1 tablet (75 mg total) by mouth 2 (two) times a day  -     CBC and differential; Future  -     Comprehensive metabolic panel; Future  -     LDL cholesterol, direct; Future  -     Lipid Panel with Direct LDL reflex; Future  -     Magnesium; Future  -     Microalbumin / creatinine urine ratio; Future  -     Protein / creatinine ratio, urine; Future  -     UA w Reflex to Microscopic w Reflex to Culture; Future  -     TSH, 3rd generation with Free T4 reflex; Future  -     Hemoglobin A1C; Future  -     Renal function panel; Future    Mesenteric artery stenosis (HCC)  -     CBC and differential; Future  -     Comprehensive metabolic panel; Future  -     LDL cholesterol, direct; Future  -     Lipid Panel with Direct LDL reflex; Future  -     Magnesium; Future  -     Microalbumin / creatinine urine ratio; Future  -     Protein / creatinine ratio, urine; Future  -     UA w Reflex to Microscopic w Reflex to Culture; Future  -     TSH, 3rd generation with Free T4 reflex; Future  -     Hemoglobin A1C; Future  -     Renal function panel; Future  -     Ambulatory referral to Vascular Surgery; Future    Hypertensive kidney disease with stage 3 chronic kidney disease (HCC)  -     CBC and differential; Future  -     Comprehensive metabolic panel; Future  -     LDL cholesterol, direct; Future  -     Lipid Panel with Direct LDL reflex; Future  -     Magnesium; Future  -     Microalbumin / creatinine urine ratio; Future  -     Protein / creatinine ratio, urine;  Future  -     UA w Reflex to Microscopic w Reflex to Culture; Future  -     TSH, 3rd generation with Free T4 reflex; Future  -     Hemoglobin A1C; Future  -     Renal function panel; Future    Hypertension associated with diabetes ([facility] [address] )  -     CBC and differential; Future  -     Comprehensive metabolic panel; Future  -     LDL cholesterol, direct; Future  -     Lipid Panel with Direct LDL reflex; Future  -     Magnesium; Future  -     Microalbumin / creatinine urine ratio; Future  -     Protein / creatinine ratio, urine; Future  -     UA w Reflex to Microscopic w Reflex to Culture; Future  -     TSH, 3rd generation with Free T4 reflex; Future  -     Hemoglobin A1C; Future  -     amLODIPine (NORVASC) 2 5 mg tablet; Take 1 tablet (2 5 mg total) by mouth daily  -     Renal function panel; Future    Atherosclerosis of native arteries of extremities with intermittent claudication, bilateral legs (HCC)  -     CBC and differential; Future  -     Comprehensive metabolic panel; Future  -     LDL cholesterol, direct; Future  -     Lipid Panel with Direct LDL reflex; Future  -     Magnesium; Future  -     Microalbumin / creatinine urine ratio; Future  -     Protein / creatinine ratio, urine; Future  -     UA w Reflex to Microscopic w Reflex to Culture; Future  -     TSH, 3rd generation with Free T4 reflex; Future  -     Hemoglobin A1C; Future  -     Renal function panel; Future  -     cilostazol (PLETAL) 50 mg tablet; Take 1 tablet (50 mg total) by mouth 2 (two) times a day  -     Ambulatory referral to Vascular Surgery; Future    Dependent edema  -     CBC and differential; Future  -     Comprehensive metabolic panel; Future  -     LDL cholesterol, direct; Future  -     Lipid Panel with Direct LDL reflex; Future  -     Magnesium; Future  -     Microalbumin / creatinine urine ratio; Future  -     Protein / creatinine ratio, urine; Future  -     UA w Reflex to Microscopic w Reflex to Culture;  Future  -     TSH, 3rd generation with Free T4 reflex; Future  -     Hemoglobin A1C; Future  -     chlorthalidone 25 mg tablet; Take 1 tablet (25 mg total) by mouth daily  -     Renal function panel; Future    Cerebrovascular accident (CVA), unspecified mechanism ([facility] [address] )        The patient was counseled regarding instructions for management, risk factor reductions, patient and family education,impressions, risks and benefits of treatment options, side effects of medications, importance of compliance with treatment  The treatment plan was reviewed with the patient/guardian and patient/guardian understands and agrees with the treatment plan  Patient's shoes and socks removed  Right Foot/Ankle   Right Foot Inspection  Skin Exam: skin normal and skin intact no dry skin, no warmth, no callus, no erythema, no maceration, no abnormal color, no pre-ulcer, no ulcer and no callus                          Toe Exam: no swelling, no tenderness, erythema and  no right toe deformity  Sensory   Vibration: intact  Proprioception: intact   Monofilament testing: intact  Vascular    The right DP pulse is 1+  The right PT pulse is 1+  Right Toe  - Comprehensive Exam  Ecchymosis: none  Swelling: none   Tenderness: none         Left Foot/Ankle  Left Foot Inspection  Skin Exam: skin normal and skin intactno dry skin, no warmth, no erythema, no maceration, normal color, no pre-ulcer, no ulcer and no callus                         Toe Exam: no swelling, no tenderness, no erythema and no left toe deformity                   Sensory   Vibration: diminished  Proprioception: intact  Monofilament: diminished  Vascular    The left DP pulse is 1+  The left PT pulse is 1+  Left Toe  - Comprehensive Exam  Ecchymosis: none  Swelling: none   Tenderness: none       Assign Risk Category:  No deformity present;  No loss of protective sensation; Weak pulses       Risk: 1        Current Outpatient Medications:     amLODIPine (NORVASC) 2 5 mg tablet, Take 1 tablet (2 5 mg total) by mouth daily, Disp: 90 tablet, Rfl: 2    cloNIDine (CATAPRES-TTS-3) 0 3 mg/24 hr, Place 1 patch (0 3 mg total) on the skin once a week, Disp: 12 patch, Rfl: 2    clopidogrel (PLAVIX) 75 mg tablet, TAKE 1 TABLET BY MOUTH EVERY DAY, Disp: 90 tablet, Rfl: 1    Icosapent Ethyl (VASCEPA) 1 g CAPS, 2 cap bid with meals, Disp: 360 capsule, Rfl: 2    labetalol (NORMODYNE) 300 mg tablet, Take 1 tablet (300 mg total) by mouth 2 (two) times a day, Disp: 270 tablet, Rfl: 2    losartan (COZAAR) 100 MG tablet, Take 1 tablet (100 mg total) by mouth every morning, Disp: 90 tablet, Rfl: 2    buPROPion (WELLBUTRIN) 75 mg tablet, Take 1 tablet (75 mg total) by mouth 2 (two) times a day, Disp: 90 tablet, Rfl: 2    chlorthalidone 25 mg tablet, Take 1 tablet (25 mg total) by mouth daily, Disp: 90 tablet, Rfl: 2    cilostazol (PLETAL) 50 mg tablet, Take 1 tablet (50 mg total) by mouth 2 (two) times a day, Disp: 60 tablet, Rfl: 5    Subjective:      Patient ID: Veronica Mcnamara is a 70 y o  female  HPI    The following portions of the patient's history were reviewed and updated as appropriate:   She has a past medical history of Arthritis, Chronic kidney disease, Coronary artery disease, Diabetes mellitus ([address] ), Endometriosis, High cholesterol, Hypertension, Kidney disease, chronic, stage III (moderate, EGFR 30-59 ml/min) ([address] ), Lumbar disc herniation, Neuropathy, Peripheral vascular disease ([address] ), Pneumonia, Shoulder injury, Spinal stenosis, and Stroke ([address] ) (2015)  ,  does not have any pertinent problems on file  ,   has a past surgical history that includes Rotator cuff repair (Left); Tonsillectomy; Ovarian cyst surgery; Fracture surgery (Right); Colonoscopy; Cardiac catheterization; [place] retrograde pyelogram (4/6/2020); pr cystourethroscopy,fulgur 0 5-2 cm lesn (N/A, 4/6/2020); pr cystourethroscopy,ureter catheter (Bilateral, 4/6/2020); and pr cystoscopy,insert ureteral stent (Right, 4/6/2020)  ,  family history includes Arthritis in her brother; Heart defect in her father; Heart disease in her mother; Hyperlipidemia in her mother; Hypertension in her father and mother; Other in her brother; Stroke in her sister  ,   reports that she has been smoking cigarettes  She has been smoking about 0 00 packs per day  She has never used smokeless tobacco  She reports that she does not drink alcohol or use drugs  ,  is allergic to fenofibrate; atorvastatin; colesevelam; colestipol; ezetimibe; pollen extract; rosuvastatin; and statins       Review of Systems   Constitutional: Negative for appetite change, chills, fatigue, fever and unexpected weight change  HENT: Negative for congestion, ear pain, facial swelling, hearing loss, mouth sores, nosebleeds, postnasal drip, rhinorrhea, sinus pain, sore throat, trouble swallowing and voice change  Eyes: Negative for pain, discharge, redness and visual disturbance  Respiratory: Negative for apnea, chest tightness, shortness of breath, wheezing and stridor  Cardiovascular: Negative for chest pain, palpitations and leg swelling  Gastrointestinal: Negative for abdominal distention, abdominal pain, blood in stool, constipation, diarrhea and vomiting  Endocrine: Negative for cold intolerance, heat intolerance, polydipsia, polyphagia and polyuria  Genitourinary: Negative for difficulty urinating, dysuria, flank pain, frequency, genital sores, hematuria and urgency  Musculoskeletal: Negative for arthralgias and back pain  Skin: Negative for rash and wound  Allergic/Immunologic: Negative for environmental allergies, food allergies and immunocompromised state  Neurological: Negative for dizziness, tremors, seizures, syncope, facial asymmetry, speech difficulty, weakness, light-headedness, numbness and headaches  Hematological: Negative for adenopathy  Does not bruise/bleed easily     Psychiatric/Behavioral: Negative for agitation, behavioral problems, dysphoric mood, hallucinations, self-injury, sleep disturbance and suicidal ideas  The patient is not hyperactive            Objective:  /80 (BP Location: Right arm)   Pulse 67   Temp 98 6 °F (37 °C)   Resp 16   Ht 4' 11"   Wt 80 3 kg (177 lb)   LMP  (LMP Unknown)   SpO2 99%   BMI 35 75 kg/m²     Lab Review  Billing Encounter on 07/15/2020   Component Date Value    Color, UA 07/15/2020 Dk Yellow     Clarity, UA 07/15/2020 Cloudy     Specific Gravity, UA 07/15/2020 1 024     pH, UA 07/15/2020 5 5     Leukocytes, UA 07/15/2020 Large*    Nitrite, UA 07/15/2020 Negative     Protein, UA 07/15/2020 100 (2+)*    Glucose, UA 07/15/2020 Negative     Ketones, UA 07/15/2020 Negative     Urobilinogen, UA 07/15/2020 0 2     Bilirubin, UA 07/15/2020 Interference- unable to analyze*    Blood, UA 07/15/2020 Negative     RBC, UA 07/15/2020 None Seen     WBC, UA 07/15/2020 30-50*    Epithelial Cells 07/15/2020 Occasional     Bacteria, UA 07/15/2020 Occasional     AMORPH URATES 07/15/2020 Occasional     Urine Culture 07/15/2020 70,000-79,000 cfu/ml Citrobacter freundii*   Office Visit on 07/15/2020   Component Date Value    LEUKOCYTE ESTERASE,UA 07/15/2020 2+     NITRITE,UA 07/15/2020 nit+     SL AMB POCT UROBILINOGEN 07/15/2020 0 2     POCT URINE PROTEIN 07/15/2020 neg      PH,UA 07/15/2020 5 0     BLOOD,UA 07/15/2020 neg     SPECIFIC GRAVITY,UA 07/15/2020 1 030     KETONES,UA 07/15/2020 neg     BILIRUBIN,UA 07/15/2020 neg     GLUCOSE, UA 07/15/2020 neg      COLOR,UA 07/15/2020 dark yellow     CLARITY,UA 07/15/2020 cloudy     Hemoglobin A1C 07/15/2020 6 8*   Orders Only on 06/17/2020   Component Date Value    White Blood Cell Count 06/17/2020 7 0     Red Blood Cell Count 06/17/2020 4 60     Hemoglobin 06/17/2020 13 8     HCT 06/17/2020 39 6     MCV 06/17/2020 86     MCH 06/17/2020 30 0     MCHC 06/17/2020 34 8     RDW 06/17/2020 13 2     Platelet Count 08/40/8761 224     Neutrophils 06/17/2020 59     Lymphocytes 06/17/2020 26     Monocytes 06/17/2020 9     Eosinophils 06/17/2020 4     Basophils PCT 06/17/2020 1     Neutrophils (Absolute) 06/17/2020 4 1     Lymphocytes (Absolute) 06/17/2020 1 8     Monocytes (Absolute) 06/17/2020 0 6     Eosinophils (Absolute) 06/17/2020 0 3     Basophils ABS 06/17/2020 0 1     Immature Granulocytes 06/17/2020 1     Immature Granulocytes (A* 06/17/2020 0 1     Glucose, Random 06/17/2020 145*    BUN 06/17/2020 28     Creatinine 06/17/2020 1 61     eGFR Non  06/17/2020 CANCELED     eGFR  06/17/2020 CANCELED     SL AMB BUN/CREATININE RA* 06/17/2020 17     Sodium 06/17/2020 140     Potassium 06/17/2020 4 3     Chloride 06/17/2020 101     CO2 06/17/2020 21     CALCIUM 06/17/2020 9 6     Protein, Total 06/17/2020 7 0     Albumin 06/17/2020 4 4     Globulin, Total 06/17/2020 2 6     Albumin/Globulin Ratio 06/17/2020 1 7     TOTAL BILIRUBIN 06/17/2020 0 3     Alk Phos Isoenzymes 06/17/2020 87     AST 06/17/2020 17     ALT 06/17/2020 17     Cholesterol, Total 06/17/2020 299     Triglycerides 06/17/2020 281     HDL 06/17/2020 42     VLDL Cholesterol Calcula* 06/17/2020 56*    LDL Calculated 06/17/2020 201     Comment 06/17/2020 Comment     Creatinine, Urine 06/17/2020 97 1     Microalbum  ,U,Random 06/17/2020 473 4     Microalb/Creat Ratio 06/17/2020 488*    LDL Direct 06/17/2020 198     Uric Acid 06/17/2020 8 1     Magnesium, Serum 06/17/2020 2 0    Orders Only on 06/16/2020   Component Date Value    PTH, Intact 06/16/2020 86*   Orders Only on 06/16/2020   Component Date Value    White Blood Cell Count 06/16/2020 7 2     Red Blood Cell Count 06/16/2020 4 77     Hemoglobin 06/16/2020 13 8     HCT 06/16/2020 41 5     MCV 06/16/2020 87     MCH 06/16/2020 28 9     MCHC 06/16/2020 33 3     RDW 06/16/2020 13 0     Platelet Count 41/49/7700 247     Neutrophils 06/16/2020 59     Lymphocytes 06/16/2020 25     Monocytes 06/16/2020 10     Eosinophils 06/16/2020 4     Basophils PCT 06/16/2020 1     Neutrophils (Absolute) 06/16/2020 4 3     Lymphocytes (Absolute) 06/16/2020 1 8     Monocytes (Absolute) 06/16/2020 0 7     Eosinophils (Absolute) 06/16/2020 0 3     Basophils ABS 06/16/2020 0 1     Immature Granulocytes 06/16/2020 1     Immature Granulocytes (A* 06/16/2020 0 1     Specific Gravity 06/16/2020 1 016     Ph 06/16/2020 6 0     Color UA 06/16/2020 Yellow     Urine Appearance 06/16/2020 Cloudy*    Leukocyte Esterase 06/16/2020 2+*    Protein 06/16/2020 2+*    Glucose, 24 HR Urine 06/16/2020 Negative     Ketone, Urine 06/16/2020 Negative     Blood, Urine 06/16/2020 Negative     Bilirubin, Urine 06/16/2020 Negative     Urobilinogen Urine 06/16/2020 0 2     SL AMB NITRITES URINE, Q* 06/16/2020 Positive*    Microscopic Examination 06/16/2020 See below:     SL AMB WBC, URINE 06/16/2020 11-30*    RBC, Urine 06/16/2020 0-2     Epithelial Cells (non re* 06/16/2020 0-10     Bacteria, Urine 06/16/2020 Few     Glucose, Random 06/16/2020 146*    BUN 06/16/2020 29*    Creatinine 06/16/2020 1 58*    eGFR Non  06/16/2020 32*    eGFR  06/16/2020 37*    SL AMB BUN/CREATININE RA* 06/16/2020 18     Sodium 06/16/2020 140     Potassium 06/16/2020 4 7     Chloride 06/16/2020 102     CO2 06/16/2020 22     CALCIUM 06/16/2020 9 8     Creatinine, Urine 06/16/2020 91 8     Microalbum  ,U,Random 06/16/2020 474 3     Microalb/Creat Ratio 06/16/2020 517*    Uric Acid 06/16/2020 8 1*    Phosphorus, Serum 06/16/2020 3 7    Admission on 04/06/2020, Discharged on 04/06/2020   Component Date Value    Case Report 04/06/2020                      Value:Surgical Pathology Report                         Case: [id number]                                   Authorizing Provider:  Nati Michele MD        Collected:           04/06/2020 0847              Ordering Location:     [facility]        Received: 04/06/2020 [address] [facility]                                                      Pathologist:           Jolie Rashid MD                                                        Specimen:    Urinary Bladder, bladder tumor right trigonal                                              Final Diagnosis 04/06/2020                      Value: This result contains rich text formatting which cannot be displayed here   Additional Information 04/06/2020                      Value: This result contains rich text formatting which cannot be displayed here  Amy Mitchell Gross Description 04/06/2020                      Value: This result contains rich text formatting which cannot be displayed here     Office Visit on 03/24/2020   Component Date Value    Ventricular Rate 03/24/2020 63     Atrial Rate 03/24/2020 63     W[place] Interval 03/24/2020 170     QRSD Interval 03/24/2020 80     QT Interval 03/24/2020 422     QTC Interval 03/24/2020 431     P Axis 03/24/2020 53     QRS Axis 03/24/2020 -10     T Wave Axis 03/24/2020 108    Appointment on 03/24/2020   Component Date Value    ABO Grouping 03/24/2020 A     Rh Factor 03/24/2020 Negative     Antibody Screen 03/24/2020 Negative     Specimen Expiration Date 03/24/2020 [id number]     PTT 03/24/2020 34     Protime 03/24/2020 12 7     INR 03/24/2020 0 96     WBC 03/24/2020 7 11     RBC 03/24/2020 4 94     Hemoglobin 03/24/2020 14 1     Hematocrit 03/24/2020 44 7     MCV 03/24/2020 91     MCH 03/24/2020 28 5     MCHC 03/24/2020 31 5     RDW 03/24/2020 13 2     MPV 03/24/2020 10 4     Platelets 88/36/0690 210     nRBC 03/24/2020 0     Neutrophils Relative 03/24/2020 64     Immat GRANS % 03/24/2020 1     Lymphocytes Relative 03/24/2020 21     Monocytes Relative 03/24/2020 8     Eosinophils Relative 03/24/2020 5     Basophils Relative 03/24/2020 1     Neutrophils Absolute 03/24/2020 4 58     Immature Grans Absolute 03/24/2020 0 07     Lymphocytes Absolute 03/24/2020 1 48     Monocytes Absolute 03/24/2020 0 58     Eosinophils Absolute 03/24/2020 0 34     Basophils Absolute 03/24/2020 0 06     Sodium 03/24/2020 143     Potassium 03/24/2020 4 6     Chloride 03/24/2020 106     CO2 03/24/2020 27     ANION GAP 03/24/2020 10     BUN 03/24/2020 30*    Creatinine 03/24/2020 1 71*    Glucose, Fasting 03/24/2020 148*    Calcium 03/24/2020 9 7     eGFR 03/24/2020 29     Urine Culture 03/24/2020 50,000-59,000 cfu/ml [facility]   Component Date Value    Sodium 02/26/2020 137     Potassium 02/26/2020 4 7     Chloride 02/26/2020 103     CO2 02/26/2020 22     ANION GAP 02/26/2020 12     BUN 02/26/2020 35*    Creatinine 02/26/2020 1 59*    Glucose 02/26/2020 144*    Glucose, Fasting 02/26/2020 144*    Calcium 02/26/2020 9 4     eGFR 02/26/2020 32     POC Glucose 02/26/2020 145*   Orders Only on 02/10/2020   Component Date Value    White Blood Cell Count 02/10/2020 8 5     Red Blood Cell Count 02/10/2020 4 94     Hemoglobin 02/10/2020 14 7     HCT 02/10/2020 42 6     MCV 02/10/2020 86     MCH 02/10/2020 29 8     MCHC 02/10/2020 34 5     RDW 02/10/2020 13 8     Platelet Count 97/38/8081 214     Neutrophils 02/10/2020 64     Lymphocytes 02/10/2020 23     Monocytes 02/10/2020 7     Eosinophils 02/10/2020 4     Basophils PCT 02/10/2020 1     Neutrophils (Absolute) 02/10/2020 5 5     Lymphocytes (Absolute) 02/10/2020 1 9     Monocytes (Absolute) 02/10/2020 0 6     Eosinophils (Absolute) 02/10/2020 0 3     Basophils ABS 02/10/2020 0 0     Immature Granulocytes 02/10/2020 1     Immature Granulocytes (A* 02/10/2020 0 1     Glucose, Random 02/10/2020 140*    BUN 02/10/2020 26     Creatinine 02/10/2020 1 50*    eGFR Non African American 02/10/2020 35*    eGFR  02/10/2020 40*    SL AMB BUN/CREATININE RA* 02/10/2020 17     Sodium 02/10/2020 139     Potassium 02/10/2020 4 3     Chloride 02/10/2020 101     CO2 02/10/2020 21     CALCIUM 02/10/2020 9 7     Protein, Total 02/10/2020 6 6     Albumin 02/10/2020 4 2     Globulin, Total 02/10/2020 2 4     Albumin/Globulin Ratio 02/10/2020 1 8     TOTAL BILIRUBIN 02/10/2020 0 3     Alk Phos Isoenzymes 02/10/2020 94     AST 02/10/2020 13     ALT 02/10/2020 12     INR 02/10/2020 1 0     Prothrombin Time 02/10/2020 10 4    There may be more visits with results that are not included           Imaging  @[email or web address]  Recent Results (from the past [id number] hour(s))   ECG 12 lead   Result Value    Ventricular Rate 63    Atrial Rate 63    MT Interval 170    QRSD Interval 80    QT Interval 422    QTC Interval 431    P Axis 53    QRS Axis -10    T Wave Axis 108    Narrative    Normal sinus rhythm  Left ventricular hypertrophy with repolarization abnormality  Anteroseptal infarct (cited on or before 2019)  Cannot rule out Inferior infarct  When compared with ECG of 2019 10:49,  No significant change was found  Confirmed by Brandon Jraamillo ([id number]) on 3/24/2020 1:04:57 PM   NM myocardial perfusion spect (rx stress and/or rest)    [address] [phone]    Rest/Stress Gated SPECT Myocardial Perfusion Imaging After Regadenoson    Patient: Jennifer Dutta  MR number: [id number]  Account number: [de-identified]  : 1945  Age: 69 years  Gender: Female  Status: Outpatient  Location: Stress lab  Height: 58 in  Weight: 163 lb  BP: 206/ 84 mmHg    Allergies: ATORVASTATIN, COLESEVELAM, COLESTIPOL, EZETIMIBE, FENOFIBRATE, POLLEN EXTRACT, ROSUVASTATIN, STATINS    Diagnosis: R94 31 - Abnormal electrocardiogram [ECG] [EKG]    Primary Physician:  Leah Morrissey DO  Referring Physician:  Leah Morrissey DO  Group:  [facility]  Other:  Marv Pretty, MS, CCT  Interpreting Physician: Ruddy Zeng MD    INDICATIONS: Detection of coronary artery disease  Pre-operative risk assessment  HISTORY: The patient is a 69year old  female  Chest pain status: no chest pain  Coronary artery disease risk factors: dyslipidemia, hypertension, smoking, diabetes mellitus, and post-menopausal state  Cardiovascular history:  peripheral vascular occlusive disease, stroke, and transient ischemic attack  Co-morbidity: history of renal disease and obesity  GERD, renal and celiac stenosis  Medications: a beta blocker, a calcium channel blocker, an ACE  inhibitor/ARB, clopidogrel, and an antihypertensive agent  Previous test results: abnormal ECG and hyperlipidemia  PHYSICAL EXAM: Baseline physical exam screening: normal     REST ECG: Normal sinus rhythm  Nonspecific ST and T wave abnormalities were present  PROCEDURE: The study was performed in the the Stress lab  A regadenoson infusion pharmacologic stress test was performed  Gated SPECT myocardial perfusion imaging was performed after stress and at rest  Systolic blood pressure was 206  mmHg, at the start of the study  Diastolic blood pressure was 84 mmHg, at the start of the study  The heart rate was 63 bpm, at the start of the study  Regadenoson protocol:  HR bpm SBP mmHg DBP mmHg Symptoms Rhythm/conduct  Baseline 63 206 84 none NSR, no ectopy  Immediate 83 126 84 mild dyspnea, nausea NSR, no ectopy  1 min 76 -- -- none NSR, no ectopy  2 min 71 184 78 none NSR, no ectopy  Rest SpO2 98, Peak Stress SpO2 99    STRESS SUMMARY: Duration of pharmacologic stress was 3 min  Maximal heart rate during stress was 83 bpm  The rate-pressure product for the peak heart rate and blood pressure was 03936  There was no chest pain during stress  The stress test  was terminated due to protocol completion   The stress ECG was negative for ischemia and normal     ISOTOPE ADMINISTRATION:  Resting isotope administration Stress isotope administration  Agent Tetrofosmin Tetrofosmin  Dose 9 9 mCi 29 6 mCi  Date 2019  Injection-image interval 30 min 30 min    The radiopharmaceutical was injected at the peak effect of pharmacologic stress  MYOCARDIAL PERFUSION IMAGING:  The image quality was good  PERFUSION DEFECTS:  -  There was a small, mildly severe, partially reversible myocardial perfusion defect of anterior and inferior wall  -  There was of the inferior and anterior wall  GATED SPECT:  The calculated left ventricular ejection fraction was 61 %  There was no left ventricular regional abnormality  SUMMARY:  -  Stress results: There was no chest pain during stress  -  ECG conclusions: The stress ECG was negative for ischemia and normal   -  Perfusion imaging: There was a small, mildly severe, partially reversible myocardial perfusion defect of anterior and inferior wall There was of the inferior and anterior wall  -  Gated SPECT: The calculated left ventricular ejection fraction was 61 %  There was no left ventricular regional abnormality  Prepared and signed by    Chet Sarah MD  Signed 2019 17:53:08     Echo complete with contrast if indicated    [place] [id number]  [phone]    Transthoracic Echocardiogram  2D, M-mode, Doppler, and Color Doppler    Study date:  2019    Patient: Olman Garcia  MR number: [id number]  Account number: [de-identified]  : 15-Juma-1945  Age: 69 years  Gender: Female  Status: Outpatient  Location: Echo lab  Height: 58 in  Weight: 163 lb  BP: 216/ 86 mmHg    Indications: Abnormal EKG, Assess left ventricular function      Diagnoses: R94 31 - Abnormal electrocardiogram [ECG] [EKG]    Sonographer:   Medical [place] , RDCS  Referring Physician:  Uyen Doyle DO  Group:  [facility]  Interpreting Physician:  Chet [place] MD    SUMMARY    LEFT VENTRICLE:  Systolic function was normal  Ejection fraction was estimated to be 60 %  There were no regional wall motion abnormalities  Wall thickness was mildly increased  Doppler parameters were consistent with abnormal left ventricular relaxation (grade 1 diastolic dysfunction)  Doppler parameters were consistent with elevated ventricular end-diastolic filling pressure  LEFT ATRIUM:  The atrium was mildly dilated  MITRAL VALVE:  There was mild annular calcification  There was mild regurgitation  TRICUSPID VALVE:  There was trace regurgitation  AORTA:  The root exhibited normal size and mild fibrocalcific change  HISTORY: Consistent with transient ischemic attack or stroke  PRIOR HISTORY: CKD, Risk factors: hypertension, diabetes, and hypercholesterolemia  PROCEDURE: The procedure was performed in the echo lab  This was a routine study  The transthoracic approach was used  The study included complete 2D imaging, M-mode, complete spectral Doppler, and color Doppler  The heart rate was 60 bpm,  at the start of the study  Images were obtained from the parasternal, apical, subcostal, and suprasternal notch acoustic windows  Image quality was adequate  LEFT VENTRICLE: Size was normal  Systolic function was normal  Ejection fraction was estimated to be 60 %  There were no regional wall motion abnormalities  Wall thickness was mildly increased  No evidence of apical thrombus  DOPPLER:  Doppler parameters were consistent with abnormal left ventricular relaxation (grade 1 diastolic dysfunction)  Doppler parameters were consistent with elevated ventricular end-diastolic filling pressure  RIGHT VENTRICLE: The size was normal  Systolic function was normal  Wall thickness was normal     LEFT ATRIUM: The atrium was mildly dilated  RIGHT ATRIUM: Size was normal     MITRAL VALVE: There was mild annular calcification  Valve structure was normal  There was mild-moderate calcification  There was mildly reduced leaflet separation   DOPPLER: The transmitral velocity was within the normal range  There was no  evidence for stenosis  There was mild regurgitation  AORTIC VALVE: The valve was trileaflet  Leaflets exhibited normal thickness and normal cuspal separation  DOPPLER: Transaortic velocity was within the normal range  There was no evidence for stenosis  There was no significant  regurgitation  TRICUSPID VALVE: The valve structure was normal  There was normal leaflet separation  DOPPLER: The transtricuspid velocity was within the normal range  There was no evidence for stenosis  There was trace regurgitation  PULMONIC VALVE: Leaflets exhibited normal thickness, no calcification, and normal cuspal separation  DOPPLER: The transpulmonic velocity was within the normal range  There was no significant regurgitation  PERICARDIUM: There was no pericardial effusion  The pericardium was normal in appearance  AORTA: The root exhibited normal size and mild fibrocalcific change  SYSTEMIC VEINS: IVC: The inferior vena cava was normal in size  SYSTEM MEASUREMENT TABLES    2D  %FS: 30 9 %  Ao Diam: 2 7 cm  EDV(Teich): 80 7 ml  EF(Teich): 58 9 %  ESV(Teich): 33 1 ml  IVSd: 1 1 cm  LA Area: 17 cm2  LA Diam: 3 9 cm  LVEDV MOD A4C: 115 5 ml  LVEF MOD A4C: 60 5 %  LVESV MOD A4C: 45 6 ml  LVIDd: 4 2 cm  LVIDs: 2 9 cm  LVLd A4C: 8 cm  LVLs A4C: 6 7 cm  LVPWd: 1 1 cm  RA Area: 15 1 cm2  RVIDd: 2 9 cm  SV MOD A4C: 69 9 ml  SV(Teich): 47 5 ml    MM  TAPSE: 2 1 cm    PW  E': 0 m/s  E/E': 27 9  MV A Carlos A: 1 5 m/s  MV Dec [place]: 3 4 m/s2  MV DecT: 293 8 ms  MV E Carlos A: 1 m/s  MV E/A Ratio: 0 7  MV PHT: 85 2 ms  MVA By PHT: 2 6 cm2    [employer] Accredited Echocardiography Laboratory    Prepared and electronically signed by    Katelynn Napoles MD  Signed [id number] 17:32:58       No orders to display     No results found for this or any previous visit  Physical Exam   Constitutional: She is oriented to person, place, and time  She appears well-developed     HENT: Right Ear: External ear normal    Left Ear: External ear normal    Eyes: Right eye exhibits no discharge  Left eye exhibits no discharge  No scleral icterus  Neck: Carotid bruit is not present  No tracheal deviation present  No thyroid mass and no thyromegaly present  Cardiovascular: Normal rate, regular rhythm and normal heart sounds  Exam reveals no gallop and no friction rub  Pulses are weak pulses  No murmur heard  Pulses:       Dorsalis pedis pulses are 1+ on the right side and 1+ on the left side  Posterior tibial pulses are 1+ on the right side and 1+ on the left side  Pulmonary/Chest: No respiratory distress  She has no wheezes  She has no rales  Musculoskeletal:      Right lower leg: Edema present  Left lower leg: Edema present  Feet:   Right Foot:   Skin Integrity: Negative for ulcer, skin breakdown, erythema, warmth, callus or dry skin  Left Foot:   Skin Integrity: Negative for ulcer, skin breakdown, erythema, warmth, callus or dry skin  Lymphadenopathy:     She has no cervical adenopathy  Neurological: She is alert and oriented to person, place, and time  Coordination normal    Psychiatric: Her behavior is normal  Judgment and thought content normal    Nursing note and vitals reviewed

## 2020-08-12 NOTE — PROGRESS NOTES
Office Cystoscopy Procedure Note    Indication:    Urothelial carcinoma of the bladder, noninvasive, low-grade bladder cancer diagnosed in April 2020    Informed consent   The risks, benefits, complications, treatment options, and expected outcomes were discussed with the patient  The patient concurred with the proposed plan and provided informed consent  Anesthesia  Lidocaine jelly 2%    Antibiotic prophylaxis   None    Procedure  In the presence of a female nurse, the patient was placed in the supine frog-legged position, was prepped and draped in the usual manner using sterile technique, and 2% lidocaine jelly instilled into the urethra  Prior to this pelvic examination showed normal labia majora and minora, a small caliber vaginal introitus, moderate quality vaginal mucosa, and showed no pelvic floor descensus and no urethral hypermobility  Upon stress maneuvers there was no stress incontinence  A 17 F flexible cystoscope was then inserted into the urethra and the urethra and bladder carefully examined    The following findings were noted:    Findings:  Urethra:  Normal, no diverticulum, no urothelial lesions  Bladder:  Normal, evidence of resection of the right ureteral orifice has healed well, no stricture here, no tumors, lesions, defects, stones, or carcinoma in situ are noted, negative surveillance cystoscopy  Ureteral orifices:  Normal, apart from previous resection of the right ureteral orifice  Other findings:  None, retroflexed view confirms    Specimens: None                 Complications:    None; patient tolerated the procedure well           Disposition: To home            Condition: Stable    Plan: Cystoscopy today shows no recurrence    Patient is due for upper tract imaging in April 2022, will be due for surveillance cystoscopies every 3 months until that time    Cystoscopy    Date/Time: 8/14/2020 9:49 AM  Performed by: Héctor Irizarry MD  Authorized by: Héctor Irizarry MD     Procedure details: cystoscopy    Patient tolerance: Patient tolerated the procedure well with no immediate complications    Additional Procedure Details: Office Cystoscopy Procedure Note    Indication:    Urothelial carcinoma of the bladder, noninvasive, low-grade bladder cancer diagnosed in April 2020    Informed consent   The risks, benefits, complications, treatment options, and expected outcomes were discussed with the patient  The patient concurred with the proposed plan and provided informed consent  Anesthesia  Lidocaine jelly 2%    Antibiotic prophylaxis   None    Procedure  In the presence of a female nurse, the patient was placed in the supine frog-legged position, was prepped and draped in the usual manner using sterile technique, and 2% lidocaine jelly instilled into the urethra  Prior to this pelvic examination showed normal labia majora and minora, a small caliber vaginal introitus, moderate quality vaginal mucosa, and showed no pelvic floor descensus and no urethral hypermobility  Upon stress maneuvers there was no stress incontinence  A 17 F flexible cystoscope was then inserted into the urethra and the urethra and bladder carefully examined    The following findings were noted:    Findings:  Urethra:  Normal, no diverticulum, no urothelial lesions  Bladder:  Normal, evidence of resection of the right ureteral orifice has healed well, no stricture here, no tumors, lesions, defects, stones, or carcinoma in situ are noted, negative surveillance cystoscopy  Ureteral orifices:  Normal, apart from previous resection of the right ureteral orifice  Other findings:  None, retroflexed view confirms    Specimens: None                 Complications:    None; patient tolerated the procedure well           Disposition: To home            Condition: Stable    Plan: Cystoscopy today shows no recurrence    Patient is due for upper tract imaging in April 2022, will be due for surveillance cystoscopies every 3 months until that time

## 2020-08-12 NOTE — PATIENT INSTRUCTIONS
Bladder Cancer   WHAT YOU NEED TO KNOW:   What is bladder cancer? Bladder cancer starts in the cells that line your bladder  What increases my risk for bladder cancer? · Smoking cigarettes    · Age older than 60    · Exposure to certain chemicals found in paint, dyes, rubber, plastic, metal, and automobile exhaust    · Exposure to arsenic in drinking water or food    · A family history of bladder cancer    · Chronic bladder irritation or inflammation from urinary catheters or frequent urinary tract infections    · Eating large amounts of high-fat foods or red meats  What are the signs and symptoms of bladder cancer? · Blood in your urine or urine that is pink or orange    · A sudden need to urinate, or urinating more often than usual    · Trouble starting the stream of urine or urinating very little    · Pain or burning when you urinate    · Pain in your abdomen or pelvis     · Unexplained weight loss    · Feeling tired or weak  How is bladder cancer diagnosed? Your healthcare provider will examine you  He may insert a gloved finger into your rectum and feel your bladder  You may need any of the following:  · A urine sample  is checked for blood, an infection, or abnormal cells  · X-ray, ultrasound, CT, or MRI  pictures may show the tumor size and location  The pictures may also show if the cancer has spread to other places in your body  You may be given contrast liquid to help the bladder, kidneys, and ureters show up better in pictures  Tell the healthcare provider if you have ever had an allergic reaction to contrast liquid  Do not enter the MRI room with anything metal  Metal can cause serious injury  Tell the healthcare provider if you have any metal in or on your body  · Cystoscopy  is a procedure used to look inside the bladder  · A biopsy  is a procedure to remove a small piece of tissue from your bladder  The tissue is sent to the lab and tested for cancer    How is bladder cancer treated? · Transurethral resection of bladder tumor (TURBT)  is a procedure used to remove the tumor  Bladder muscle near the tumor may also be removed  This procedure is done by inserting tools through your urethra and into your bladder  · Immunotherapy  is medicine given to help your immune system kill cancer cells  It is injected into your vein or directly into your bladder  · Chemotherapy  is medicine given to kill cancer cells  It may be given to you as a pill or an injection into your vein or muscle  It may also be injected directly into your bladder  · Radiation therapy  uses high-energy x-ray beams to kill cancer cells  · Surgery  may be needed to remove your bladder  Surrounding organs and lymph nodes may also be removed  What can I do to care for myself? · Do not smoke  Nicotine can damage blood vessels and make it hard to manage your bladder cancer  Smoking also increases your risk for new or returning cancer  Ask your healthcare provider for information if you currently smoke and need help to quit  E-cigarettes or smokeless tobacco still contain nicotine  Talk to your healthcare provider before you use these products  · Limit or do not drink alcohol  Alcohol may cause you to become dehydrated  Ask your oncologist if it is safe for you to drink alcohol, and how much is safe to drink  · Eat healthy foods  Healthy foods include fruit, vegetables, whole-grain breads, low-fat dairy products, beans, lean meats, and fish  Your healthcare provider may recommend that you eat less red meat  You need to eat enough calories to help prevent weight loss and increase your energy level  You also need protein to give you strength  If you do not feel hungry, eat small amounts often instead of large meals  · Drink liquids as directed  You may need to drink more liquids than usual to prevent dehydration  Ask how much liquid to drink each day and which liquids are best for you  · Exercise as directed  Exercise may help increase your energy level and appetite  Ask your healthcare provider how much exercise you need and which exercises are best for you  Where can I find more information and support? It may be difficult for you and your family to go through cancer and cancer treatments  Join a support group or talk with others who have gone through treatment  · [address]  [place] [address]  Phone: [phone]  Web Address: http://[employer]/  [employer]  · [address] [place] [address], [address] [place] [address]  Phone: [phone]  Web Address: http://[employer]/  gov  Call 911 for any of the following:   · You suddenly feel lightheaded and short of breath  · You cough up blood  When should I seek immediate care? · Your arm or leg feels warm, tender, and painful  It may look swollen and red  · You are unable to urinate  When should I contact my healthcare provider? · You have a fever  · You vomit and cannot keep any liquids or food down  · You have new or worsening pain  · Your pain gets worse or does not go away after you take pain medicine  · You have questions or concerns about your condition or care  CARE AGREEMENT:   You have the right to help plan your care  Learn about your health condition and how it may be treated  Discuss treatment options with your caregivers to decide what care you want to receive  You always have the right to refuse treatment  The above information is an  only  It is not intended as medical advice for individual conditions or treatments  Talk to your doctor, nurse or pharmacist before following any medical regimen to see if it is safe and effective for you  © 2017 [address] Information is for End User's use only and may not be sold, redistributed or otherwise used for commercial purposes   All illustrations and images included in [facility] are the copyrighted property of [employer]  or [employer]

## 2020-08-14 ENCOUNTER — PROCEDURE VISIT (OUTPATIENT)
Dept: UROLOGY | Facility: CLINIC | Age: 75
End: 2020-08-14
Payer: COMMERCIAL

## 2020-08-14 VITALS
WEIGHT: 175.2 LBS | DIASTOLIC BLOOD PRESSURE: 68 MMHG | BODY MASS INDEX: 35.32 KG/M2 | TEMPERATURE: 97.6 F | HEIGHT: 59 IN | SYSTOLIC BLOOD PRESSURE: 126 MMHG

## 2020-08-14 DIAGNOSIS — Z08 ENCOUNTER FOR FOLLOW-UP SURVEILLANCE OF UROTHELIAL CARCINOMA OF LOWER URINARY TRACT: ICD-10-CM

## 2020-08-14 DIAGNOSIS — D49.4 BLADDER TUMOR: Primary | ICD-10-CM

## 2020-08-14 DIAGNOSIS — Z85.50 ENCOUNTER FOR FOLLOW-UP SURVEILLANCE OF UROTHELIAL CARCINOMA OF LOWER URINARY TRACT: ICD-10-CM

## 2020-08-14 LAB
SL AMB  POCT GLUCOSE, UA: NORMAL
SL AMB LEUKOCYTE ESTERASE,UA: NORMAL
SL AMB POCT BILIRUBIN,UA: NORMAL
SL AMB POCT BLOOD,UA: NORMAL
SL AMB POCT CLARITY,UA: YELLOW
SL AMB POCT COLOR,UA: CLEAR
SL AMB POCT KETONES,UA: NORMAL
SL AMB POCT NITRITE,UA: NORMAL
SL AMB POCT PH,UA: 5
SL AMB POCT SPECIFIC GRAVITY,UA: 1.02
SL AMB POCT URINE PROTEIN: 30
SL AMB POCT UROBILINOGEN: NORMAL

## 2020-08-14 PROCEDURE — 81002 URINALYSIS NONAUTO W/O SCOPE: CPT | Performed by: UROLOGY

## 2020-08-14 PROCEDURE — 52000 CYSTOURETHROSCOPY: CPT | Performed by: UROLOGY

## 2020-08-14 PROCEDURE — 3061F NEG MICROALBUMINURIA REV: CPT | Performed by: INTERNAL MEDICINE

## 2020-08-14 NOTE — LETTER
August 14, 2020     Hailee Conrad,   [id number] [employer] [id number]    Patient: Virgil Rowe   YOB: 1945   Date of Visit: 8/14/2020       Dear Dr Sebas Padilla: Thank you for referring Cinda Cruz to me for evaluation  Below are my notes for this consultation  If you have questions, please do not hesitate to call me  I look forward to following your patient along with you  Sincerely,        Nati Michele MD        CC: No Recipients  Nati Michele MD  8/14/2020  9:50 AM  Sign when Signing Visit  Office Cystoscopy Procedure Note    Indication:    Urothelial carcinoma of the bladder, noninvasive, low-grade bladder cancer diagnosed in April 2020    Informed consent   The risks, benefits, complications, treatment options, and expected outcomes were discussed with the patient  The patient concurred with the proposed plan and provided informed consent  Anesthesia  Lidocaine jelly 2%    Antibiotic prophylaxis   None    Procedure  In the presence of a female nurse, the patient was placed in the supine frog-legged position, was prepped and draped in the usual manner using sterile technique, and 2% lidocaine jelly instilled into the urethra  Prior to this pelvic examination showed normal labia majora and minora, a small caliber vaginal introitus, moderate quality vaginal mucosa, and showed no pelvic floor descensus and no urethral hypermobility  Upon stress maneuvers there was no stress incontinence  A 17 F flexible cystoscope was then inserted into the urethra and the urethra and bladder carefully examined    The following findings were noted:    Findings:  Urethra:  Normal, no diverticulum, no urothelial lesions  Bladder:  Normal, evidence of resection of the right ureteral orifice has healed well, no stricture here, no tumors, lesions, defects, stones, or carcinoma in situ are noted, negative surveillance cystoscopy  Ureteral orifices:  Normal, apart from previous resection of the right ureteral orifice  Other findings:  None, retroflexed view confirms    Specimens: None                 Complications:    None; patient tolerated the procedure well           Disposition: To home            Condition: Stable    Plan: Cystoscopy today shows no recurrence    Patient is due for upper tract imaging in April 2022, will be due for surveillance cystoscopies every 3 months until that time    Cystoscopy    Date/Time: 8/14/2020 9:49 AM  Performed by: Vernon Goltz, MD  Authorized by: Vernon Goltz, MD     Procedure details: cystoscopy    Patient tolerance: Patient tolerated the procedure well with no immediate complications    Additional Procedure Details: Office Cystoscopy Procedure Note    Indication:    Urothelial carcinoma of the bladder, noninvasive, low-grade bladder cancer diagnosed in April 2020    Informed consent   The risks, benefits, complications, treatment options, and expected outcomes were discussed with the patient  The patient concurred with the proposed plan and provided informed consent  Anesthesia  Lidocaine jelly 2%    Antibiotic prophylaxis   None    Procedure  In the presence of a female nurse, the patient was placed in the supine frog-legged position, was prepped and draped in the usual manner using sterile technique, and 2% lidocaine jelly instilled into the urethra  Prior to this pelvic examination showed normal labia majora and minora, a small caliber vaginal introitus, moderate quality vaginal mucosa, and showed no pelvic floor descensus and no urethral hypermobility  Upon stress maneuvers there was no stress incontinence  A 17 F flexible cystoscope was then inserted into the urethra and the urethra and bladder carefully examined    The following findings were noted:    Findings:  Urethra:  Normal, no diverticulum, no urothelial lesions  Bladder:  Normal, evidence of resection of the right ureteral orifice has healed well, no stricture here, no tumors, lesions, defects, stones, or carcinoma in situ are noted, negative surveillance cystoscopy  Ureteral orifices:  Normal, apart from previous resection of the right ureteral orifice  Other findings:  None, retroflexed view confirms    Specimens: None                 Complications:    None; patient tolerated the procedure well           Disposition: To home            Condition: Stable    Plan: Cystoscopy today shows no recurrence    Patient is due for upper tract imaging in April 2022, will be due for surveillance cystoscopies every 3 months until that time

## 2020-08-24 ENCOUNTER — HOSPITAL ENCOUNTER (OUTPATIENT)
Dept: CT IMAGING | Facility: CLINIC | Age: 75
Discharge: HOME | End: 2020-08-24
Payer: COMMERCIAL

## 2020-08-24 DIAGNOSIS — R91.8 LUNG NODULES: ICD-10-CM

## 2020-08-24 PROCEDURE — 71250 CT THORAX DX C-: CPT

## 2020-08-27 ENCOUNTER — OFFICE VISIT (OUTPATIENT)
Dept: PULMONOLOGY | Facility: CLINIC | Age: 75
End: 2020-08-27
Payer: COMMERCIAL

## 2020-08-27 VITALS
BODY MASS INDEX: 35.28 KG/M2 | TEMPERATURE: 97.6 F | SYSTOLIC BLOOD PRESSURE: 124 MMHG | WEIGHT: 175 LBS | OXYGEN SATURATION: 97 % | HEIGHT: 59 IN | DIASTOLIC BLOOD PRESSURE: 80 MMHG | HEART RATE: 84 BPM

## 2020-08-27 DIAGNOSIS — F17.200 TOBACCO USE DISORDER: ICD-10-CM

## 2020-08-27 DIAGNOSIS — R91.8 LUNG NODULES: Primary | ICD-10-CM

## 2020-08-27 DIAGNOSIS — R93.89 ABNORMAL CT OF THE CHEST: ICD-10-CM

## 2020-08-27 DIAGNOSIS — Z12.11 SCREENING FOR COLON CANCER: ICD-10-CM

## 2020-08-27 PROCEDURE — 4040F PNEUMOC VAC/ADMIN/RCVD: CPT | Performed by: INTERNAL MEDICINE

## 2020-08-27 PROCEDURE — 3008F BODY MASS INDEX DOCD: CPT | Performed by: INTERNAL MEDICINE

## 2020-08-27 PROCEDURE — 1036F TOBACCO NON-USER: CPT | Performed by: INTERNAL MEDICINE

## 2020-08-27 PROCEDURE — 99214 OFFICE O/P EST MOD 30 MIN: CPT | Performed by: INTERNAL MEDICINE

## 2020-08-27 PROCEDURE — 1160F RVW MEDS BY RX/DR IN RCRD: CPT | Performed by: INTERNAL MEDICINE

## 2020-08-27 PROCEDURE — 3066F NEPHROPATHY DOC TX: CPT | Performed by: INTERNAL MEDICINE

## 2020-08-27 PROCEDURE — 3079F DIAST BP 80-89 MM HG: CPT | Performed by: INTERNAL MEDICINE

## 2020-08-27 PROCEDURE — 3074F SYST BP LT 130 MM HG: CPT | Performed by: INTERNAL MEDICINE

## 2020-08-27 PROCEDURE — 3044F HG A1C LEVEL LT 7.0%: CPT | Performed by: INTERNAL MEDICINE

## 2020-08-27 NOTE — PROGRESS NOTES
Assessment/Plan:   Diagnoses and all orders for this visit:    Lung nodules  -     CT chest without contrast; Future    Abnormal CT of the chest    Screening for colon cancer  -     Ambulatory referral to Gastroenterology; Future    Tobacco use disorder        Recent PFTs with no evidence of any obstructive lung disease FEV1 has been completely normal along with the DLCO also has been normal   Reviewed CT of the chest report and images with the patient   With diffuse tree-in-bud appearance is, likely infectious versus inflammatory etiology currently not having any fevers no evidence of any leukocytosis, likely all inflammation  CT of the chest demonstrating multiple 5 mm and smaller nodules, and indeterminate 7 mm ground-glass opacity in the right apex which has been unchanged from the prior CT in October of 2019  Repeat CT of the chest in August of 2020 the 7 mm ground-glass opacity has been stable  The multiple small lung nodules have remained stable  Repeat CT of the chest in 1 year for stability  Congratulated the patient on significantly cutting down the smoking in the past 4 weeks and having not smoked for the past 2 weeks  Encouraged the patient to continue to quit smoking  She also states that her  is a chain smoker and has been difficult for her to cut down the smoking but she has been trying her best she states  I have offered her the the nicotine replacement with gum as well as patch if she still has the urge which she has currently declined  Discussed regarding flu shot in the fall of 2020  I will see her back in 1 year with the CT of the chest or p r n  Earlier as needed  Gerard Cabrera in about 1 year (around 8/27/2021)  All questions are answered to the patient's satisfaction and understanding  She verbalizes understanding  She is encouraged to call with any further questions or concerns  Portions of the record may have been created with voice recognition software    Occasional wrong word or "sound a like" substitutions may have occurred due to the inherent limitations of voice recognition software  Read the chart carefully and recognize, using context, where substitutions have occurred  Electronically Signed by Luca Quintanilla MD    ______________________________________________________________________    Chief Complaint:   Chief Complaint   Patient presents with    Follow-up       Patient ID: [facility] is a 70 y o  y o  female has a past medical history of Arthritis, Chronic kidney disease, Coronary artery disease, Diabetes mellitus ([address] ), Endometriosis, High cholesterol, Hypertension, Kidney disease, chronic, stage III (moderate, EGFR 30-59 ml/min) ([address] ), Lumbar disc herniation, Neuropathy, Peripheral vascular disease ([address] ), Pneumonia, Shoulder injury, Spinal stenosis, and Stroke ([address] ) (2015)  8/27/2020  Patient presents today for follow-up visit  Patient is a very pleasant 19-year-old lady, with extensive smoking history with greater than 60 pack year smoking history still smoking a pack a day, until recently she states for the past 4 weeks has significantly cut down and for the past 2 weeks has not smoked  She had a CT of the chest for lung cancer screening done in October of 2019 which demonstrated diffuse tree-in-bud appearance is bilaterally, for which she has been referred  She does complain of some cough mainly dry nonproductive, occasionally white mucoid sputum no hemoptysis  She does have some sinus congestion with the postnasal drip on a constant basis she states  Here for follow-up of the abnormal CT of the chest      Review of Systems   Constitutional: Negative  HENT: Negative  Eyes: Negative  Respiratory: Positive for cough and shortness of breath (at baseline)  Cardiovascular: Negative  Gastrointestinal: Negative  Endocrine: Negative  Genitourinary: Negative  Musculoskeletal: Negative  Allergic/Immunologic: Negative  Neurological: Negative  Hematological: Negative  Psychiatric/Behavioral: Negative  Smoking history: She reports that she quit smoking about 4 weeks ago  Her smoking use included cigarettes  She smoked 0 00 packs per day  She has never used smokeless tobacco     The following portions of the patient's history were reviewed and updated as appropriate: allergies, current medications, past family history, past medical history, past social history, past surgical history and problem list     Immunization History   Administered Date(s) Administered    Pneumococcal Conjugate 13-Valent 09/21/2016    Pneumococcal Polysaccharide PPV23 12/14/2011     Current Outpatient Medications   Medication Sig Dispense Refill    amLODIPine (NORVASC) 2 5 mg tablet Take 1 tablet (2 5 mg total) by mouth daily 90 tablet 2    buPROPion (WELLBUTRIN) 75 mg tablet Take 1 tablet (75 mg total) by mouth 2 (two) times a day 90 tablet 2    chlorthalidone 25 mg tablet Take 1 tablet (25 mg total) by mouth daily 90 tablet 2    cilostazol (PLETAL) 50 mg tablet Take 1 tablet (50 mg total) by mouth 2 (two) times a day 60 tablet 5    cloNIDine (CATAPRES-TTS-3) 0 3 mg/24 hr Place 1 patch (0 3 mg total) on the skin once a week 12 patch 2    clopidogrel (PLAVIX) 75 mg tablet TAKE 1 TABLET BY MOUTH EVERY DAY 90 tablet 1    Icosapent Ethyl (VASCEPA) 1 g CAPS 2 cap bid with meals 360 capsule 2    labetalol (NORMODYNE) 300 mg tablet Take 1 tablet (300 mg total) by mouth 2 (two) times a day 270 tablet 2    losartan (COZAAR) 100 MG tablet Take 1 tablet (100 mg total) by mouth every morning 90 tablet 2     No current facility-administered medications for this visit  Allergies: Fenofibrate; Colesevelam; Colestipol; Ezetimibe; Pollen extract; and Statins    Objective:  Vitals:    08/27/20 1345   BP: 124/80   Pulse: 84   Temp: 97 6 °F (36 4 °C)   SpO2: 97%   Weight: 79 4 kg (175 lb)   Height: 4' 11" (1 499 m)   Oxygen Therapy  SpO2: 97 %      Wt Readings from Last 3 Encounters:   08/27/20 79 4 kg (175 lb)   08/14/20 79 5 kg (175 lb 3 2 oz)   08/05/20 80 3 kg (177 lb)     Body mass index is 35 35 kg/m²  Physical Exam  Vitals signs and nursing note reviewed  Constitutional:       Appearance: She is well-developed  HENT:      Head: Normocephalic and atraumatic  Eyes:      Conjunctiva/sclera: Conjunctivae normal       Pupils: Pupils are equal, round, and reactive to light  Neck:      Musculoskeletal: Normal range of motion and neck supple  Thyroid: No thyromegaly  Vascular: No JVD  Cardiovascular:      Rate and Rhythm: Normal rate and regular rhythm  Heart sounds: Normal heart sounds  No murmur  No friction rub  No gallop  Pulmonary:      Effort: Pulmonary effort is normal  No respiratory distress  Breath sounds: Normal breath sounds  No wheezing or rales  Chest:      Chest wall: No tenderness  Musculoskeletal: Normal range of motion  General: No tenderness or deformity  Lymphadenopathy:      Cervical: No cervical adenopathy  Skin:     General: Skin is warm and dry  Neurological:      Mental Status: She is alert and oriented to person, place, and time  Diagnostics:  I have personally reviewed pertinent films in PACS  Ct Chest Without Contrast    Result Date: 8/26/2020  Narrative: CT CHEST WITHOUT IV CONTRAST INDICATION:   R91 8: Other nonspecific abnormal finding of lung field  COMPARISON:  CT chest 1/25/2020 and 10/29/2019 TECHNIQUE: CT examination of the chest was performed without intravenous contrast   Axial, sagittal, and coronal 2D reformatted images were created from the source data and submitted for interpretation  Radiation dose length product (DLP) for this visit:  127 mGy-cm   This examination, like all CT scans performed in the [facility], was performed utilizing techniques to minimize radiation dose exposure, including the use of iterative reconstruction and automated exposure control  FINDINGS: LUNGS:  7 mm groundglass nodular opacity right upper lobe image 26 series 205, unchanged since October 2019  6 mm subpleural nodule left lower lobe image 85 series 205, unchanged since January 2020  Prior 5 mm left lower lobe subpleural nodule has resolved  Multiple additional 5 mm and smaller nodules predominantly in the upper lobes, unchanged since October 2019  No new suspicious pulmonary nodule  No airspace consolidation  Central airways are clear  PLEURA:  Unremarkable  HEART/GREAT VESSELS:  Heart is not enlarged  Minimal fluid pooling in the anterior pericardial sac  Aortic and coronary artery calcification  MEDIASTINUM AND JERMAIN:  Unremarkable  CHEST WALL AND LOWER NECK:   Unremarkable  VISUALIZED STRUCTURES IN THE UPPER ABDOMEN:  Unremarkable  OSSEOUS STRUCTURES:  No acute fracture or osseous destructive lesion identified  Degenerative changes of the spine  Multilevel thoracic spondylosis in a pattern suggestive of diffuse idiopathic skeletal hyperostosis  Impression: 7 mm groundglass nodular opacity in the right upper lobe unchanged since October 2019  Advise noncontrast chest CT follow-up in 12 months  5 mm left lower lobe subpleural nodule noted previously has resolved  Remainder of the pulmonary nodules, 6 mm and smaller unchanged since October 2019  This study demonstrates a finding requiring imaging follow-up and was documented as such in Epic   Workstation performed: [id number]

## 2020-09-01 ENCOUNTER — OFFICE VISIT (OUTPATIENT)
Dept: CARDIOLOGY CLINIC | Facility: CLINIC | Age: 75
End: 2020-09-01
Payer: COMMERCIAL

## 2020-09-01 VITALS
WEIGHT: 176.6 LBS | OXYGEN SATURATION: 98 % | BODY MASS INDEX: 35.6 KG/M2 | HEART RATE: 71 BPM | DIASTOLIC BLOOD PRESSURE: 80 MMHG | SYSTOLIC BLOOD PRESSURE: 148 MMHG | HEIGHT: 59 IN

## 2020-09-01 DIAGNOSIS — I10 HYPERTENSION, ESSENTIAL: Primary | ICD-10-CM

## 2020-09-01 DIAGNOSIS — N18.30 STAGE 3 CHRONIC KIDNEY DISEASE (HCC): ICD-10-CM

## 2020-09-01 DIAGNOSIS — E78.2 HYPERLIPEMIA, MIXED: ICD-10-CM

## 2020-09-01 DIAGNOSIS — F17.200 SMOKING: ICD-10-CM

## 2020-09-01 PROCEDURE — 99214 OFFICE O/P EST MOD 30 MIN: CPT | Performed by: INTERNAL MEDICINE

## 2020-09-01 NOTE — PROGRESS NOTES
[facility] [place]  [address] [place] [address]  Cardiology Follow Up    Rach Fang  1945  [id number]      1  Hypertension, essential     2  Hyperlipemia, mixed     3  Smoking     4  Stage 3 chronic kidney disease [facility])         Chief Complaint   Patient presents with    Follow-up     6 month follow up       Interval History:  Patient presents for follow-up visit  Patient denies any chest pain  Patient does have some shortness of breath with exertion which is stable  Patient has peripheral vascular disease and supposed to see vascular surgery soon  Patient is on Plavix as well as pletal     Patient does have history of hyperlipidemia and unable to tolerate statins  Patient did have cardiac catheterization which showed moderate LAD disease and occluded RCA with collaterals  Medical management was recommended  Patient also has CKD  Patient Active Problem List   Diagnosis    Spinal stenosis    Basilar artery stenosis    Breast mass    Cerebrovascular accident (CVA) ([address]    Chronic GERD    Stage 3 chronic kidney disease ([address] )    Claudication in peripheral vascular disease ([address] )    Depression with anxiety    Type 2 diabetes mellitus with diabetic nephropathy ([address] )    Endometriosis    Gout    Hx-TIA (transient ischemic attack)    Hypercholesteremia    Hyperlipidemia associated with type 2 diabetes mellitus ([address] )    Hypertension    Hypertension associated with diabetes ([address] )    Osteoarthritis    Obesity (BMI 30-39  9)    Polyneuropathy    Right renal artery stenosis (HCC)    Tobacco use disorder    Vertebrobasilar artery syndrome    Vitamin D deficiency    Statin intolerance    Lumbar disc herniation    Pain of left lower extremity    Abnormal EKG    Spondylosis of lumbar region without myelopathy or radiculopathy    Aortoiliac stenosis, left (HCC)    Hypokalemia    Acute drug-induced gout of left foot    Decreased pulses in feet  Refused influenza vaccine    Hypercalcemia    Lumbosacral spondylosis without myelopathy    Neurodermatitis    Other proteinuria    Obesity with body mass index 30 or greater    Hyperlipidemia, unspecified    Herniated lumbar intervertebral disc    Atherosclerosis of renal artery (HCC)    Celiac artery stenosis (HCC) >70% stenosis in the celiac trunk    Abnormal CT of the chest suspicious for an infectious or inflammatory bronchiolitis   Positive cardiac stress test  small, mildly severe, partially reversible myocardial perfusion defect of anterior and inferior wall     Femoral artery stenosis, right (HCC) 50-75% stenosis in the common femoral artery      Mesenteric artery stenosis (HCC)    Immunization not carried out because of patient refusal    Median arcuate ligament syndrome ([facility]    Abdominal bruit    Initial Medicare annual wellness visit    Hypertensive kidney disease with stage 3 chronic kidney disease ([address] )    Atherosclerosis of native arteries of extremities with intermittent claudication, bilateral legs (HCC)    Asymptomatic bilateral carotid artery stenosis    Pulmonary nodule 7 mm groundglass opacity in the right upper lobe, unchanged since October 2019    Stenosis of left anterior descending artery Mid LAD 50-60% stenosis    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    Urinary frequency    Malignant neoplasm of overlapping sites of bladder ([address]    Encounter for removal of ureteral stent    Cervical radiculopathy    Acute pain of left shoulder    Edema of left lower extremity    Dark urine    Localized edema     Past Medical History:   Diagnosis Date    Arthritis     Chronic kidney disease     Coronary artery disease     Diabetes mellitus ([address] )     Patient states she is not Diabetic    Endometriosis     High cholesterol     Hypertension     Kidney disease, chronic, stage III (moderate, EGFR 30-59 ml/min) ([address] )  Lumbar disc herniation     Neuropathy     Peripheral vascular disease (HCC)     Pneumonia     Shoulder injury     left    Spinal stenosis     Stroke ([facility] ) 2015    Memory loss     Social History     Socioeconomic History    Marital status: /Civil Union     Spouse name: Not on file    Number of children: Not on file    Years of education: Not on file    Highest education level: Not on file   Occupational History    Not on file   Social Needs    Financial resource strain: Not on file    Food insecurity     Worry: Not on file     Inability: Not on file   [employer] needs     Medical: Not on file     Non-medical: Not on file   Tobacco Use    Smoking status: Former Smoker     Packs/day: 0 00     Types: Cigarettes     Last attempt to quit: 2020     Years since quittin 0    Smokeless tobacco: Never Used    Tobacco comment: 3 cigarettes per week   Substance and Sexual Activity    Alcohol use: No    Drug use: No    Sexual activity: Not Currently     Partners: Male   Lifestyle    Physical activity     Days per week: Not on file     Minutes per session: Not on file    Stress: Not on file   Relationships    Social connections     Talks on phone: Not on file     Gets together: Not on file     Attends Orthodox service: Not on file     Active member of club or organization: Not on file     Attends meetings of clubs or organizations: Not on file     Relationship status: Not on file    Intimate partner violence     Fear of current or ex partner: Not on file     Emotionally abused: Not on file     Physically abused: Not on file     Forced sexual activity: Not on file   Other Topics Concern    Not on file   Social History Narrative    Occasional Caffeine Consumption      Family History   Problem Relation Age of Onset    Hypertension Mother     Heart disease Mother         Valvular    Hyperlipidemia Mother     Hypertension Father     Heart defect Father         Cardiomegaly    Stroke Sister         Cerebrovascular Accident    Arthritis Brother     Other Brother         Back Disorder     Past Surgical History:   Procedure Laterality Date    CARDIAC CATHETERIZATION      COLONOSCOPY      FL RETROGRADE PYELOGRAM  4/6/2020    FRACTURE SURGERY Right     ankle    OVARIAN CYST SURGERY      WY CYSTOSCOPY,INSERT URETERAL STENT Right 4/6/2020    Procedure: INSERTION STENT URETERAL;  Surgeon: Susanne Felix MD;  [facility];  Service: Urology    WY CYSTOURETHROSCOPY,FULGUR 0 5-2 CM LESN N/A 4/6/2020    Procedure: TRANSURETHRAL RESECTION OF BLADDER TUMOR (TURBT);   Surgeon: Susanne Felix MD;  [facility];  Service: Urology    WY CYSTOURETHROSCOPY,URETER CATHETER Bilateral 4/6/2020    Procedure: CYSTOSCOPY WITH RETROGRADE PYELOGRAM;  Surgeon: Susanne Felix MD;  [facility];  Service: Urology    ROTATOR CUFF REPAIR Left     TONSILLECTOMY         Current Outpatient Medications:     amLODIPine (NORVASC) 2 5 mg tablet, Take 1 tablet (2 5 mg total) by mouth daily, Disp: 90 tablet, Rfl: 2    chlorthalidone 25 mg tablet, Take 1 tablet (25 mg total) by mouth daily, Disp: 90 tablet, Rfl: 2    cilostazol (PLETAL) 50 mg tablet, Take 1 tablet (50 mg total) by mouth 2 (two) times a day, Disp: 60 tablet, Rfl: 5    cloNIDine (CATAPRES-TTS-3) 0 3 mg/24 hr, Place 1 patch (0 3 mg total) on the skin once a week, Disp: 12 patch, Rfl: 2    clopidogrel (PLAVIX) 75 mg tablet, TAKE 1 TABLET BY MOUTH EVERY DAY, Disp: 90 tablet, Rfl: 1    Icosapent Ethyl (VASCEPA) 1 g CAPS, 2 cap bid with meals, Disp: 360 capsule, Rfl: 2    labetalol (NORMODYNE) 300 mg tablet, Take 1 tablet (300 mg total) by mouth 2 (two) times a day, Disp: 270 tablet, Rfl: 2    losartan (COZAAR) 100 MG tablet, Take 1 tablet (100 mg total) by mouth every morning, Disp: 90 tablet, Rfl: 2    buPROPion (WELLBUTRIN) 75 mg tablet, Take 1 tablet (75 mg total) by mouth 2 (two) times a day (Patient not taking: Reported on 9/1/2020), Disp: 90 tablet, Rfl: 2  Allergies   Allergen Reactions    Fenofibrate Other (See Comments)      blood in urine  hx  Kidney Failure    Colesevelam Other (See Comments)      leg pains    Colestipol Itching and Other (See Comments)      Swelling lower legs    Ezetimibe GI Intolerance    Pollen Extract Allergic Rhinitis    Statins Myalgia       Labs:  Procedure visit on 08/14/2020   Component Date Value    LEUKOCYTE ESTERASE,UA 08/14/2020 NEG     NITRITE,UA 08/14/2020 NEG     SL AMB POCT UROBILINOGEN 08/14/2020 NEG     POCT URINE PROTEIN 08/14/2020 30      PH,UA 08/14/2020 5 0     BLOOD,UA 08/14/2020 NEG     SPECIFIC GRAVITY,UA 08/14/2020 1 025     KETONES,UA 08/14/2020 NEG     BILIRUBIN,UA 08/14/2020 NEG     GLUCOSE, UA 08/14/2020 NEG      COLOR,UA 08/14/2020 Clear     CLARITY,UA 08/14/2020 Yellow    Billing Encounter on 07/15/2020   Component Date Value    Color, UA 07/15/2020 Dk Yellow     Clarity, UA 07/15/2020 Cloudy     Specific Gravity, UA 07/15/2020 1 024     pH, UA 07/15/2020 5 5     Leukocytes, UA 07/15/2020 Large*    Nitrite, UA 07/15/2020 Negative     Protein, UA 07/15/2020 100 (2+)*    Glucose, UA 07/15/2020 Negative     Ketones, UA 07/15/2020 Negative     Urobilinogen, UA 07/15/2020 0 2     Bilirubin, UA 07/15/2020 Interference- unable to analyze*    Blood, UA 07/15/2020 Negative     RBC, UA 07/15/2020 None Seen     WBC, UA 07/15/2020 30-50*    Epithelial Cells 07/15/2020 Occasional     Bacteria, UA 07/15/2020 Occasional     AMORPH URATES 07/15/2020 Occasional     Urine Culture 07/15/2020 70,000-79,000 cfu/ml Citrobacter freundii*   Office Visit on 07/15/2020   Component Date Value    LEUKOCYTE ESTERASE,UA 07/15/2020 2+     NITRITE,UA 07/15/2020 nit+     SL AMB POCT UROBILINOGEN 07/15/2020 0 2     POCT URINE PROTEIN 07/15/2020 neg      PH,UA 07/15/2020 5 0     BLOOD,UA 07/15/2020 neg     SPECIFIC GRAVITY,UA 07/15/2020 1 030     Laila Doll 07/15/2020 neg     BILIRUBIN,UA 07/15/2020 neg     GLUCOSE, UA 07/15/2020 neg      COLOR,UA 07/15/2020 dark yellow     CLARITY,UA 07/15/2020 cloudy     Hemoglobin A1C 07/15/2020 6 8*     Imaging: Ct Chest Without Contrast    Result Date: 8/26/2020  Narrative: CT CHEST WITHOUT IV CONTRAST INDICATION:   R91 8: Other nonspecific abnormal finding of lung field  COMPARISON:  CT chest 1/25/2020 and 10/29/2019 TECHNIQUE: CT examination of the chest was performed without intravenous contrast   Axial, sagittal, and coronal 2D reformatted images were created from the source data and submitted for interpretation  Radiation dose length product (DLP) for this visit:  127 mGy-cm   This examination, like all CT scans performed in the [facility], was performed utilizing techniques to minimize radiation dose exposure, including the use of iterative reconstruction and automated exposure control  FINDINGS: LUNGS:  7 mm groundglass nodular opacity right upper lobe image 26 series 205, unchanged since October 2019  6 mm subpleural nodule left lower lobe image 85 series 205, unchanged since January 2020  Prior 5 mm left lower lobe subpleural nodule has resolved  Multiple additional 5 mm and smaller nodules predominantly in the upper lobes, unchanged since October 2019  No new suspicious pulmonary nodule  No airspace consolidation  Central airways are clear  PLEURA:  Unremarkable  HEART/GREAT VESSELS:  Heart is not enlarged  Minimal fluid pooling in the anterior pericardial sac  Aortic and coronary artery calcification  MEDIASTINUM AND JERMAIN:  Unremarkable  CHEST WALL AND LOWER NECK:   Unremarkable  VISUALIZED STRUCTURES IN THE UPPER ABDOMEN:  Unremarkable  OSSEOUS STRUCTURES:  No acute fracture or osseous destructive lesion identified  Degenerative changes of the spine  Multilevel thoracic spondylosis in a pattern suggestive of diffuse idiopathic skeletal hyperostosis       Impression: 7 mm groundglass nodular opacity in the right upper lobe unchanged since October 2019  Advise noncontrast chest CT follow-up in 12 months  5 mm left lower lobe subpleural nodule noted previously has resolved  Remainder of the pulmonary nodules, 6 mm and smaller unchanged since October 2019  This study demonstrates a finding requiring imaging follow-up and was documented as such in Epic  Workstation performed: [id number]       Review of Systems:  Review of Systems   REVIEW OF SYSTEMS:  Constitutional:  Denies fever or chills   Eyes:  Denies change in visual acuity   HENT:  Denies nasal congestion or sore throat   Respiratory:   shortness of breath   Cardiovascular:  Denies chest pain or edema   GI:  Denies abdominal pain, nausea, vomiting, bloody stools or diarrhea   :  Denies dysuria, frequency, difficulty in micturition and nocturia  Musculoskeletal:  Denies back pain or joint pain   Neurologic:  Denies headache, focal weakness or sensory changes   Endocrine:  Denies polyuria or polydipsia   Lymphatic:  Denies swollen glands   Psychiatric:  Denies depression or anxiety     Physical Exam:    /80   Pulse 71   Ht 4' 11" (1 499 m)   Wt 80 1 kg (176 lb 9 6 oz)   LMP  (LMP Unknown)   SpO2 98%   BMI 35 67 kg/m²     Physical Exam   PHYSICAL EXAM:  General:  Patient is not in acute distress   Head: Normocephalic, Atraumatic  HEENT:  Both pupils normal-size atraumatic, normocephalic, nonicteric  Neck:  JVP not raised  Trachea central  No carotid bruit  Respiratory:  normal breath sounds no crackles  no rhonchi  Cardiovascular:  Regular rate and rhythm no S3 no murmurs  GI:  Abdomen soft nontender  No organomegaly  Lymphatic:  No cervical or inguinal lymphadenopathy  Neurologic:  Patient is awake alert, oriented   Grossly nonfocal  Trace edema      Discussion/Summary:  Patient with multiple medical problems who seems to be doing reasonably well from cardiac standpoint  Previous studies reviewed with patient   Medications reviewed and possible side effects discussed  concepts of cardiovascular disease , signs and symptoms of heart disease  Dietary and risk factor modification reinforced  All questions answered  Safety measures reviewed  Patient advised to report any problems prompting medical attention  Patient will follow-up with vascular surgery  Patient will continue Plavix  At this time will discontinue Pletal till evaluated by vascular surgery as the combination is not recommended  Results of cardiac catheterization reviewed with the patient  Patient is not interested in PCSK9 inhibitors injectable  Follow-up in 6 months  Follow-up with primary care physician and vascular surgery  Patient is agreeable with the plan of care

## 2020-09-03 ENCOUNTER — HOSPITAL ENCOUNTER (OUTPATIENT)
Dept: NON INVASIVE DIAGNOSTICS | Facility: CLINIC | Age: 75
Discharge: HOME | End: 2020-09-03
Payer: COMMERCIAL

## 2020-09-03 DIAGNOSIS — I70.1 ATHEROSCLEROSIS OF RENAL ARTERY (HCC): ICD-10-CM

## 2020-09-03 DIAGNOSIS — I65.23 ASYMPTOMATIC BILATERAL CAROTID ARTERY STENOSIS: ICD-10-CM

## 2020-09-03 PROCEDURE — 93975 VASCULAR STUDY: CPT | Performed by: SURGERY

## 2020-09-03 PROCEDURE — 93880 EXTRACRANIAL BILAT STUDY: CPT

## 2020-09-03 PROCEDURE — 93880 EXTRACRANIAL BILAT STUDY: CPT | Performed by: SURGERY

## 2020-09-03 PROCEDURE — 93975 VASCULAR STUDY: CPT

## 2020-09-08 ENCOUNTER — HOSPITAL ENCOUNTER (OUTPATIENT)
Dept: NON INVASIVE DIAGNOSTICS | Facility: CLINIC | Age: 75
Discharge: HOME | End: 2020-09-08
Payer: COMMERCIAL

## 2020-09-08 DIAGNOSIS — I73.9 CLAUDICATION IN PERIPHERAL VASCULAR DISEASE (HCC): ICD-10-CM

## 2020-09-08 PROCEDURE — 93925 LOWER EXTREMITY STUDY: CPT

## 2020-09-08 PROCEDURE — 93925 LOWER EXTREMITY STUDY: CPT | Performed by: SURGERY

## 2020-09-08 PROCEDURE — 93922 UPR/L XTREMITY ART 2 LEVELS: CPT | Performed by: SURGERY

## 2020-09-08 PROCEDURE — 93923 UPR/LXTR ART STDY 3+ LVLS: CPT

## 2020-09-09 ENCOUNTER — TELEPHONE (OUTPATIENT)
Dept: INTERNAL MEDICINE CLINIC | Facility: CLINIC | Age: 75
End: 2020-09-09

## 2020-09-09 NOTE — TELEPHONE ENCOUNTER
----- Message from Kenia Martin DO sent at 9/8/2020  2:29 PM EDT -----  Please call patient she does have significant arterial disease of her right and left arteries be sure she has follow-up with vascular surgery

## 2020-09-09 NOTE — TELEPHONE ENCOUNTER
Pt aware and is scheduled for f/u with vascular will discuss at that time  scheduled for Monday 09/14/2020

## 2020-09-09 NOTE — TELEPHONE ENCOUNTER
----- Message from Angelito Irby DO sent at 9/8/2020  2:29 PM EDT -----  Please call patient she does have significant arterial disease of her right and left arteries be sure she has follow-up with vascular surgery

## 2020-09-10 ENCOUNTER — OFFICE VISIT (OUTPATIENT)
Dept: NEUROLOGY | Facility: CLINIC | Age: 75
End: 2020-09-10
Payer: COMMERCIAL

## 2020-09-10 VITALS
SYSTOLIC BLOOD PRESSURE: 114 MMHG | HEART RATE: 74 BPM | WEIGHT: 172.8 LBS | BODY MASS INDEX: 34.84 KG/M2 | DIASTOLIC BLOOD PRESSURE: 86 MMHG | TEMPERATURE: 98.5 F | HEIGHT: 59 IN

## 2020-09-10 DIAGNOSIS — M48.062 SPINAL STENOSIS OF LUMBAR REGION WITH NEUROGENIC CLAUDICATION: Primary | ICD-10-CM

## 2020-09-10 DIAGNOSIS — M47.816 LUMBAR FACET JOINT SYNDROME: ICD-10-CM

## 2020-09-10 DIAGNOSIS — I73.9 PAD (PERIPHERAL ARTERY DISEASE) (HCC): ICD-10-CM

## 2020-09-10 PROCEDURE — 99213 OFFICE O/P EST LOW 20 MIN: CPT | Performed by: PSYCHIATRY & NEUROLOGY

## 2020-09-10 NOTE — PROGRESS NOTES
Progress Note - Neurology   Keyona Blakely 70 y o  female MRN: [id number]  Unit/Bed#:  Encounter: [id number]      Subjective:   Patient is here for a follow-up visit with back pain radiating to the lower extremities, as well as pain in both her legs especially when she ambulates for any period of time  Patient is to see vascular surgery next week, for peripheral vascular disease has multiple occlusions bilaterally in both lower extremities with stenosis and remains on Plavix 75 mg daily  She denies any bladder bowel symptoms  Patient does complain of weakness in both her legs and generally has to sit for any relief of symptoms  Patient has quit smoking 1 month ago  ROS:   Review of Systems   Constitutional: Positive for activity change  Negative for appetite change and fever  HENT: Negative  Negative for hearing loss, tinnitus, trouble swallowing and voice change  Eyes: Negative  Negative for photophobia and pain  Respiratory: Negative  Negative for shortness of breath  Cardiovascular: Positive for leg swelling  Negative for palpitations  Gastrointestinal: Negative  Negative for nausea and vomiting  Endocrine: Negative  Negative for cold intolerance  Genitourinary: Negative  Negative for dysuria, frequency and urgency  Musculoskeletal: Positive for back pain and gait problem  Negative for myalgias and neck pain  Skin: Negative  Negative for rash  Allergic/Immunologic: Negative  Neurological: Positive for weakness  Negative for dizziness, tremors, seizures, syncope, facial asymmetry, speech difficulty, light-headedness, numbness and headaches  Hematological: Negative  Does not bruise/bleed easily  Psychiatric/Behavioral: Negative  Negative for confusion, hallucinations and sleep disturbance  MA review of systems was reviewed by myself  Vitals:   Frances Fox   Vitals:    09/10/20 0842   BP: 114/86   BP Location: Left arm   Patient Position: Sitting   Cuff Size: Adult   Pulse: 74 Temp: 98 5 °F (36 9 °C)   Weight: 78 4 kg (172 lb 12 8 oz)   Height: 4' 11" (1 499 m)       MEDS:      Current Outpatient Medications:     amLODIPine (NORVASC) 2 5 mg tablet, Take 1 tablet (2 5 mg total) by mouth daily, Disp: 90 tablet, Rfl: 2    buPROPion (WELLBUTRIN) 75 mg tablet, Take 1 tablet (75 mg total) by mouth 2 (two) times a day (Patient not taking: Reported on 9/1/2020), Disp: 90 tablet, Rfl: 2    chlorthalidone 25 mg tablet, Take 1 tablet (25 mg total) by mouth daily, Disp: 90 tablet, Rfl: 2    cloNIDine (CATAPRES-TTS-3) 0 3 mg/24 hr, Place 1 patch (0 3 mg total) on the skin once a week, Disp: 12 patch, Rfl: 2    clopidogrel (PLAVIX) 75 mg tablet, TAKE 1 TABLET BY MOUTH EVERY DAY, Disp: 90 tablet, Rfl: 1    Icosapent Ethyl (VASCEPA) 1 g CAPS, 2 cap bid with meals, Disp: 360 capsule, Rfl: 2    labetalol (NORMODYNE) 300 mg tablet, Take 1 tablet (300 mg total) by mouth 2 (two) times a day, Disp: 270 tablet, Rfl: 2    losartan (COZAAR) 100 MG tablet, Take 1 tablet (100 mg total) by mouth every morning, Disp: 90 tablet, Rfl: 2  :    Physical Exam:  General appearance: alert, appears stated age and cooperative  Head: Normocephalic, without obvious abnormality, atraumatic    On neurological examination patient is alert awake oriented, speech is fluent, cranial nerves 2-12 intact, and on motor and sensory exam there is no evidence of any focal weakness in the upper or lower extremities, she has sensory loss in the left lower extremity L4-L5 and L5-S1 dermatomal distributions to pinprick and light touch, deep tendon reflexes are diminished bilaterally, no significant lumbosacral tenderness was noted, and patient ambulates with the help of a cane  No evidence of any finger-to-nose dysmetria was noted  Lab Results: I have personally reviewed pertinent reports  Imaging Studies: I have personally reviewed pertinent reports  Assessment:  1   Lumbar facet syndrome with spinal stenosis  2  Peripheral vascular disease  3  Gait dysfunction with claudication most likely as a result of peripheral vascular disease and spinal stenosis  Plan:  Patient has an appointment next week with vascular surgery, she was also offered pain management at this time but she declines, lumbar support brace will be prescribed, and patient will return back to see me in 3 months  Patient will continue Plavix with a history CVA  9/10/2020,8:42 AM    Dictation voice to text software has been used in the creation of this document  Please consider this in light of any contextual or grammatical errors

## 2020-09-11 ENCOUNTER — TELEPHONE (OUTPATIENT)
Dept: VASCULAR SURGERY | Facility: CLINIC | Age: 75
End: 2020-09-11

## 2020-09-11 LAB
ALBUMIN SERPL-MCNC: 4.5 G/DL (ref 3.7–4.7)
ALBUMIN/CREAT UR: 231 MG/G CREAT (ref 0–29)
ALBUMIN/GLOB SERPL: 1.6 {RATIO} (ref 1.2–2.2)
ALP SERPL-CCNC: 107 IU/L (ref 39–117)
ALT SERPL-CCNC: 13 IU/L (ref 0–32)
APPEARANCE UR: CLEAR
AST SERPL-CCNC: 14 IU/L (ref 0–40)
BACTERIA UR CULT: ABNORMAL
BACTERIA URNS QL MICRO: ABNORMAL
BASOPHILS # BLD AUTO: 0.1 X10E3/UL (ref 0–0.2)
BASOPHILS NFR BLD AUTO: 1 %
BILIRUB SERPL-MCNC: 0.5 MG/DL (ref 0–1.2)
BILIRUB UR QL STRIP: NEGATIVE
BUN SERPL-MCNC: 42 MG/DL (ref 8–27)
BUN/CREAT SERPL: 21 (ref 12–28)
CALCIUM SERPL-MCNC: 9.9 MG/DL (ref 8.7–10.3)
CHLORIDE SERPL-SCNC: 97 MMOL/L (ref 96–106)
CHOLEST SERPL-MCNC: 311 MG/DL (ref 100–199)
CO2 SERPL-SCNC: 24 MMOL/L (ref 20–29)
COLOR UR: YELLOW
CREAT SERPL-MCNC: 2.02 MG/DL (ref 0.57–1)
CREAT UR-MCNC: 119.2 MG/DL
CYTOLOGY CMNT CVX/VAG CYTO-IMP: ABNORMAL
EOSINOPHIL # BLD AUTO: 0.4 X10E3/UL (ref 0–0.4)
EOSINOPHIL NFR BLD AUTO: 4 %
EPI CELLS #/AREA URNS HPF: ABNORMAL /HPF (ref 0–10)
ERYTHROCYTE [DISTWIDTH] IN BLOOD BY AUTOMATED COUNT: 13.8 % (ref 11.7–15.4)
GLOBULIN SER-MCNC: 2.8 G/DL (ref 1.5–4.5)
GLUCOSE SERPL-MCNC: 179 MG/DL (ref 65–99)
GLUCOSE UR QL: NEGATIVE
HBA1C MFR BLD: 7.3 % (ref 4.8–5.6)
HCT VFR BLD AUTO: 41 % (ref 34–46.6)
HDLC SERPL-MCNC: 36 MG/DL
HGB BLD-MCNC: 13.9 G/DL (ref 11.1–15.9)
HGB UR QL STRIP: NEGATIVE
IMM GRANULOCYTES # BLD: 0.1 X10E3/UL (ref 0–0.1)
IMM GRANULOCYTES NFR BLD: 1 %
KETONES UR QL STRIP: NEGATIVE
LDLC SERPL CALC-MCNC: 205 MG/DL (ref 0–99)
LDLC SERPL DIRECT ASSAY-MCNC: 211 MG/DL (ref 0–99)
LEUKOCYTE ESTERASE UR QL STRIP: ABNORMAL
LYMPHOCYTES # BLD AUTO: 1.5 X10E3/UL (ref 0.7–3.1)
LYMPHOCYTES NFR BLD AUTO: 18 %
Lab: ABNORMAL
MAGNESIUM SERPL-MCNC: 2.3 MG/DL (ref 1.6–2.3)
MCH RBC QN AUTO: 28.7 PG (ref 26.6–33)
MCHC RBC AUTO-ENTMCNC: 33.9 G/DL (ref 31.5–35.7)
MCV RBC AUTO: 85 FL (ref 79–97)
MICRO URNS: ABNORMAL
MICROALBUMIN UR-MCNC: 274.8 UG/ML
MONOCYTES # BLD AUTO: 0.8 X10E3/UL (ref 0.1–0.9)
MONOCYTES NFR BLD AUTO: 9 %
NEUTROPHILS # BLD AUTO: 5.7 X10E3/UL (ref 1.4–7)
NEUTROPHILS NFR BLD AUTO: 67 %
NITRITE UR QL STRIP: NEGATIVE
PH UR STRIP: 6.5 [PH] (ref 5–7.5)
PHOSPHATE SERPL-MCNC: 3.9 MG/DL (ref 3–4.3)
PLATELET # BLD AUTO: 227 X10E3/UL (ref 150–450)
POTASSIUM SERPL-SCNC: 4 MMOL/L (ref 3.5–5.2)
PROT SERPL-MCNC: 7.3 G/DL (ref 6–8.5)
PROT UR QL STRIP: ABNORMAL
RBC # BLD AUTO: 4.84 X10E6/UL (ref 3.77–5.28)
RBC #/AREA URNS HPF: ABNORMAL /HPF (ref 0–2)
SL AMB ANTIMICROBIAL SUSCEPTIBILITY: ABNORMAL
SL AMB EGFR AFRICAN AMERICAN: 28 ML/MIN/1.73
SL AMB EGFR NON AFRICAN AMERICAN: 24 ML/MIN/1.73
SL AMB URINALYSIS REFLEX: ABNORMAL
SODIUM SERPL-SCNC: 138 MMOL/L (ref 134–144)
SP GR UR: 1.02 (ref 1–1.03)
TRIGL SERPL-MCNC: 344 MG/DL (ref 0–149)
TSH SERPL DL<=0.005 MIU/L-ACNC: 3.32 UIU/ML (ref 0.45–4.5)
UROBILINOGEN UR STRIP-ACNC: 0.2 MG/DL (ref 0.2–1)
WBC # BLD AUTO: 8.5 X10E3/UL (ref 3.4–10.8)
WBC #/AREA URNS HPF: ABNORMAL /HPF (ref 0–5)

## 2020-09-11 PROCEDURE — 3051F HG A1C>EQUAL 7.0%<8.0%: CPT | Performed by: NURSE PRACTITIONER

## 2020-09-14 ENCOUNTER — CONSULT (OUTPATIENT)
Dept: VASCULAR SURGERY | Facility: CLINIC | Age: 75
End: 2020-09-14
Payer: COMMERCIAL

## 2020-09-14 VITALS
DIASTOLIC BLOOD PRESSURE: 79 MMHG | TEMPERATURE: 98.9 F | BODY MASS INDEX: 35.08 KG/M2 | RESPIRATION RATE: 18 BRPM | HEIGHT: 59 IN | HEART RATE: 66 BPM | SYSTOLIC BLOOD PRESSURE: 195 MMHG | WEIGHT: 174 LBS

## 2020-09-14 DIAGNOSIS — K55.1 MESENTERIC ARTERY STENOSIS (HCC): ICD-10-CM

## 2020-09-14 DIAGNOSIS — I70.1 ATHEROSCLEROSIS OF RENAL ARTERY (HCC): ICD-10-CM

## 2020-09-14 DIAGNOSIS — I70.201 FEMORAL ARTERY STENOSIS, RIGHT (HCC): ICD-10-CM

## 2020-09-14 DIAGNOSIS — I77.1 CELIAC ARTERY STENOSIS (HCC): ICD-10-CM

## 2020-09-14 DIAGNOSIS — I65.23 ASYMPTOMATIC BILATERAL CAROTID ARTERY STENOSIS: ICD-10-CM

## 2020-09-14 DIAGNOSIS — E11.21 TYPE 2 DIABETES MELLITUS WITH DIABETIC NEPHROPATHY, WITHOUT LONG-TERM CURRENT USE OF INSULIN (HCC): ICD-10-CM

## 2020-09-14 DIAGNOSIS — I73.9 CLAUDICATION IN PERIPHERAL VASCULAR DISEASE (HCC): ICD-10-CM

## 2020-09-14 DIAGNOSIS — I70.213 ATHEROSCLEROSIS OF NATIVE ARTERIES OF EXTREMITIES WITH INTERMITTENT CLAUDICATION, BILATERAL LEGS (HCC): ICD-10-CM

## 2020-09-14 DIAGNOSIS — I24.0 RIGHT CORONARY ARTERY OCCLUSION (HCC): Primary | ICD-10-CM

## 2020-09-14 PROCEDURE — 3066F NEPHROPATHY DOC TX: CPT | Performed by: NURSE PRACTITIONER

## 2020-09-14 PROCEDURE — 99215 OFFICE O/P EST HI 40 MIN: CPT | Performed by: PHYSICIAN ASSISTANT

## 2020-09-14 NOTE — LETTER
September 18, 2020     Jono Padilla DO  [id number] [address]    Patient: Benjy Catherine   YOB: 1945   Date of Visit: 9/14/2020     Dear Dr Ann Hernandez      Thank you for referring Maryjane Mariscal to me for evaluation  Below are the relevant portions of my assessment and plan of care  If you have questions, please do not hesitate to call me  I look forward to following Maria Teresa Garcia along with you  Sincerely,        Terrell Richmond PA-C        CC: No Recipients    Progress Notes:      Assessment/Plan:    Peripheral arterial disease with intermittent claudication  - short-distance claudication  -no wounds or rest pain  -LDL significantly elevated at 205; goal less than 70  -statin intolerant  -consider injectable cholesterol medication  -continue with Plavix  -regular walking program  -follow up TEA cardozo per protocol  -follow-up in 3 months, or sooner if wounds, increased pain with walking, rest pain or discoloration  *RIGHT renal artery stenosis with left small kidney and hypertension  -currently on 5 medicines for blood pressure (labetalol 300 bid, losartan 100, clonidine 0 3 patch, amlodipine 2 5, chlorthalidone 25)  -blood pressure in the office today in excess of 200 systolically (thinks she's nervous)  -journal blood pressures at home and bring him into the office to Nephrology  -BUN/Cr 42/2 02 9/8/2020  (28/1 61)  - ?  Consider R renal angiogram  -follow up with nephrology and Dr Preeti Louis    Celiac artery stenosis  - asymptomatic   - pt education  - follow up AOIL in 6 months      Carotid artery stenosis  -asymptomatic  -CV du 9/3/2020 R 50-69 (128/32) L 50-69%(134/32)  -reviewed symptoms of stroke for which she called 911  -continue with TLC and Plavix   -follow up carotid du in 6 months    Tobacco addictions  -recent quit in past weeks  - the importance of smoking cessation was discussed

## 2020-09-14 NOTE — PROGRESS NOTES
Assessment/Plan:    Peripheral arterial disease with intermittent claudication  - short-distance claudication  -no wounds or rest pain  -LDL significantly elevated at 205; goal less than 70  -statin intolerant  -consider injectable cholesterol medication  -continue with Plavix  -regular walking program  -follow up TEA cardozo per protocol  -follow-up in 3 months, or sooner if wounds, increased pain with walking, rest pain or discoloration  *RIGHT renal artery stenosis with left small kidney and hypertension  -currently on 5 medicines for blood pressure (labetalol 300 bid, losartan 100, clonidine 0 3 patch, amlodipine 2 5, chlorthalidone 25)  -blood pressure in the office today in excess of 319 systolically (thinks she's nervous)  -journal blood pressures at home and bring him into the office to Nephrology  -BUN/Cr 42/2 02 9/8/2020  (28/1 61)  - ? Consider R renal angiogram  -follow up with nephrology and Dr Anastasiya Braun    Celiac artery stenosis  - asymptomatic   - pt education  - follow up AOIL in 6 months      Carotid artery stenosis  -asymptomatic  -CV du 9/3/2020 R 50-69 (128/32) L 50-69%(134/32)  -reviewed symptoms of stroke for which she called 911  -continue with TLC and Plavix   -follow up carotid du in 6 months    Tobacco addictions  -recent quit in past weeks  - the importance of smoking cessation was discussed        No problem-specific Assessment & Plan notes found for this encounter  Diagnoses and all orders for this visit:    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  -     VAS carotid complete study; Future  -     VAS renal artery complete; Future  -     VAS abdominal aorta/iliacs; complete study; Future    Mesenteric artery stenosis (HCC)  -     Ambulatory referral to Vascular Surgery  -     VAS carotid complete study; Future  -     VAS renal artery complete;  Future  -     VAS abdominal aorta/iliacs; complete study; Future    Atherosclerosis of native arteries of extremities with intermittent claudication, bilateral legs (HCC)  -     Ambulatory referral to Vascular Surgery  -     VAS carotid complete study; Future  -     VAS renal artery complete; Future  -     VAS abdominal aorta/iliacs; complete study; Future    Asymptomatic bilateral carotid artery stenosis  -     VAS carotid complete study; Future  -     VAS renal artery complete; Future  -     VAS abdominal aorta/iliacs; complete study; Future    Claudication in peripheral vascular disease (HCC)  -     VAS carotid complete study; Future  -     VAS renal artery complete; Future  -     VAS abdominal aorta/iliacs; complete study; Future    Type 2 diabetes mellitus with diabetic nephropathy, without long-term current use of insulin (HCC)  -     VAS carotid complete study; Future  -     VAS renal artery complete; Future  -     VAS abdominal aorta/iliacs; complete study; Future    Atherosclerosis of renal artery (HCC)  -     VAS carotid complete study; Future  -     VAS renal artery complete; Future  -     VAS abdominal aorta/iliacs; complete study; Future    Celiac artery stenosis (HCC) >70% stenosis in the celiac trunk  -     VAS carotid complete study; Future  -     VAS renal artery complete; Future  -     VAS abdominal aorta/iliacs; complete study; Future    Femoral artery stenosis, right (HCC) 50-75% stenosis in the common femoral artery  -     VAS carotid complete study; Future  -     VAS renal artery complete; Future  -     VAS abdominal aorta/iliacs; complete study; Future        Subjective:      Patient ID: Winthrop Boas is a 70 y o  female  Patient presents in office to review the results of her renal duplex and CV done on 9/3 and her TEA done on 9/8  Patient reports not being able to walk more than a block  Patient reports she gets swelling in the at times and when she elevates her legs it goes away  Patient denies any wound or ulcers   Patient denies any TIA/CVA type symptoms  Patient is taking plavix  HPI    Rogers Multani is a 70 y o  female with HTN, HLD, CKD III, DM, hx lacunar CVA, multivessel CAD, PAD, bladder mass s/p TURBT '20, renal artery stenosis, mesenteric stenosis, carotid artery disease and continued tobacco addiction who presents for vascular follow up and to review imaging studies  Patient reports that she can't walk much  On top of short distance claudication she reports that she has "a lot of arthritis and spinal stenosis" so she can't get around much  Her claudication has been unchanged for the past months  Sometimes her anterior lower legs bother her just changing the sheets or other ADLs  She has been sleeping in a chair because she legs right leg pain if she turns a certain way  We reviewed her studies  There is some progression in her lower extremity arterial duplex study but the disease process is similar  She has not been interested in lower extremity angiogram and tells me that she doesn't want to be on dialysis  She is going to seek an order for a motor scooter from her PCP so she can get around better  We reviewed the renal duplex which shows R FÉLIX and the left kidney is small  The patient knew about these findings from previously  Her blood pressure is elevated and she is on 5 BP meds with Scr increased since we last saw her  She has no sx of mesenteric ischemia  Her carotid du is stable  She has no symptoms of TIA/stroke  She denies transient visual loss, burry vision, difficulty speaking, facial numbness/weakness and arm/leg weakness  Lipids are not at goal   with   She can't take statin so I asked her to check injectable with cardiology  Her A1C is also elevated at 7 3  Back pain to lower extremities    TEA 9/8/2020  R 0 5/96/69; diffuse fem-pop, mid SFA occlusion; 50-75% PF; occlusion of the AT and peroneal   L 0 63/68/60; diffuse fem-pop; occlusion prox to distal SFA   Tibial disease  Renal du 9/3/2020: R > 60%; 10 86 cm; L No FÉLIX 6 28 cm     > 70% celiac; SMA is patent  The following portions of the patient's history were reviewed and updated as appropriate: allergies, current medications, past family history, past medical history, past social history, past surgical history and problem list     Review of Systems   Constitutional: Negative  Negative for chills and fever  HENT: Negative  Negative for sinus pain, sneezing, sore throat and trouble swallowing  Eyes: Negative  Negative for visual disturbance  Respiratory: Positive for shortness of breath  Cardiovascular: Positive for leg swelling  Gastrointestinal: Negative  Negative for nausea and vomiting  Endocrine: Negative  Genitourinary: Negative  Musculoskeletal: Positive for back pain (spinal stenosis)  Leg pain- artery disease   Skin: Negative  Negative for wound  Allergic/Immunologic: Positive for environmental allergies  Neurological: Positive for weakness (legs)  Negative for headaches  Hematological: Negative  Psychiatric/Behavioral: Negative  Negative for self-injury  Objective:      BP (!) 195/79 (BP Location: Left arm, Patient Position: Sitting, Cuff Size: Adult)   Pulse 66   Temp 98 9 °F (37 2 °C) (Tympanic)   Resp 18   Ht 4' 11" (1 499 m)   Wt 78 9 kg (174 lb)   LMP  (LMP Unknown)   BMI 35 14 kg/m²          Physical Exam  Vitals signs and nursing note reviewed  Constitutional:       Appearance: She is well-developed  HENT:      Head: Normocephalic and atraumatic  Eyes:      Pupils: Pupils are equal, round, and reactive to light  Neck:      Musculoskeletal: Neck supple  Thyroid: No thyromegaly  Vascular: No JVD  Trachea: Trachea normal    Cardiovascular:      Rate and Rhythm: Normal rate and regular rhythm  Pulses:           Carotid pulses are 2+ on the right side and 2+ on the left side         Radial pulses are 2+ on the right side and 2+ on the left side  Femoral pulses are 2+ on the right side and 0 on the left side  Dorsalis pedis pulses are 0 on the right side and 0 on the left side  Posterior tibial pulses are 0 on the right side and 0 on the left side  Heart sounds: Normal heart sounds, S1 normal and S2 normal  No murmur  No friction rub  No gallop  Pulmonary:      Effort: Pulmonary effort is normal  No accessory muscle usage or respiratory distress  Breath sounds: Normal breath sounds  No wheezing or rales  Abdominal:      General: Bowel sounds are normal  There is no distension  Palpations: Abdomen is soft  Tenderness: There is no abdominal tenderness  Musculoskeletal: Normal range of motion  General: No deformity  Skin:     General: Skin is warm and dry  Findings: No lesion or rash  Nails: There is no clubbing  Neurological:      Mental Status: She is alert and oriented to person, place, and time  Comments: Grossly normal    Psychiatric:         Behavior: Behavior is cooperative  I have reviewed and made appropriate changes to the review of systems input by the medical assistant      Vitals:    09/14/20 0838   BP: (!) 195/79   BP Location: Left arm   Patient Position: Sitting   Cuff Size: Adult   Pulse: 66   Resp: 18   Temp: 98 9 °F (37 2 °C)   TempSrc: Tympanic   Weight: 78 9 kg (174 lb)   Height: 4' 11" (1 499 m)       Patient Active Problem List   Diagnosis    Spinal stenosis    Basilar artery stenosis    Breast mass    C[facility]vascular [facility] (CVA) [facility] [address] [facility]    Chronic GERD    Stage 3 chronic kidney disease ([facility] [address] [facility]    Claudication in peripheral vascular disease ([facility] [address] [facility]    Depression with anxiety    Type 2 diabetes mellitus with diabetic nephropathy (HCC)    Endometriosis    Gout    Hx-TIA (transient ischemic attack)    Hypercholesteremia    Hyperlipidemia associated with type 2 diabetes mellitus ([facility] [address] [facility]    Hypertension    Hypertension associated with diabetes ([facility] [address] [facility]    Osteoarthritis    Obesity (BMI 30-39  9)    Polyneuropathy    Right renal artery stenosis (HCC)    Tobacco use disorder    Vertebrobasilar artery syndrome    Vitamin D deficiency    Statin intolerance    Lumbar disc herniation    Pain of left lower extremity    Abnormal EKG    Spondylosis of lumbar region without myelopathy or radiculopathy    Aortoiliac stenosis, left (HCC)    Hypokalemia    Acute drug-induced gout of left foot    Decreased pulses in feet    Refused influenza vaccine    Hypercalcemia    Lumbosacral spondylosis without myelopathy    Neurodermatitis    Other proteinuria    Obesity with body mass index 30 or greater    Hyperlipidemia, unspecified    Herniated lumbar intervertebral disc    Atherosclerosis of renal artery (HCC)    Celiac artery stenosis (HCC) >70% stenosis in the celiac trunk    Abnormal CT of the chest suspicious for an infectious or inflammatory bronchiolitis   Positive cardiac stress test  small, mildly severe, partially reversible myocardial perfusion defect of anterior and inferior wall     Femoral artery stenosis, right (HCC) 50-75% stenosis in the common femoral artery      Mesenteric artery stenosis (HCC)    Immunization not carried out because of patient refusal    Median arcuate ligament syndrome ([facility]    Abdominal bruit    Initial Medicare annual wellness visit    Hypertensive kidney disease with stage 3 chronic kidney disease ([facility] [address] [facility]    Atherosclerosis of native arteries of extremities with intermittent claudication, bilateral legs (HCC)    Asymptomatic bilateral carotid artery stenosis    Pulmonary nodule 7 mm groundglass opacity in the right upper lobe, unchanged since October 2019    Stenosis of left anterior descending artery Mid LAD 50-60% stenosis    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    Urinary frequency    Malignant neoplasm of overlapping sites of bladder ([facility] [address] [facility]    Encounter for removal of ureteral stent    Cervical radiculopathy    Acute pain of left shoulder    Edema of left lower extremity    Dark urine    Localized edema       Past Surgical History:   Procedure Laterality Date    CARDIAC CATHETERIZATION      COLONOSCOPY      FL RETROGRADE PYELOGRAM  4/6/2020    FRACTURE SURGERY Right     ankle    OVARIAN CYST SURGERY      OK CYSTOSCOPY,INSERT URETERAL STENT Right 4/6/2020    Procedure: INSERTION STENT URETERAL;  Surgeon: Lazaro Pham MD;  [facility];  Service: Urology    OK CYSTOURETHROSCOPY,FULGUR 0 5-2 CM LESN N/A 4/6/2020    Procedure: TRANSURETHRAL RESECTION OF BLADDER TUMOR (TURBT);   Surgeon: Lazaro Pham MD;  [facility];  Service: Urology    OK CYSTOURETHROSCOPY,URETER CATHETER Bilateral 4/6/2020    Procedure: CYSTOSCOPY WITH RETROGRADE PYELOGRAM;  Surgeon: Lazaro Pham MD;  [facility];  Service: Urology    ROTATOR CUFF REPAIR Left     TONSILLECTOMY         Family History   Problem Relation Age of Onset    Hypertension Mother     Heart disease Mother         Valvular    Hyperlipidemia Mother     Hypertension Father     Heart defect Father         Cardiomegaly    Stroke Sister         Cerebrovascular Accident    Arthritis Brother     Other Brother         Back Disorder       Social History     Socioeconomic History    Marital status: /Civil Union     Spouse name: Not on file    Number of children: Not on file    Years of education: Not on file    Highest education level: Not on file   Occupational History    Not on file   Social Needs    Financial resource strain: Not on file    Food insecurity     Worry: Not on file     Inability: Not on file    Transportation needs     Medical: Not on file     Non-medical: Not on file   Tobacco Use    Smoking status: Former Smoker     Packs/day: 0 00     Types: Cigarettes     Last attempt to quit: 7/27/2020 Years since quittin 1    Smokeless tobacco: Never Used    Tobacco comment: 3 cigarettes per week   Substance and Sexual Activity    Alcohol use: No    Drug use: No    Sexual activity: Not Currently     Partners: Male   Lifestyle    Physical activity     Days per week: Not on file     Minutes per session: Not on file    Stress: Not on file   Relationships    Social connections     Talks on phone: Not on file     Gets together: Not on file     Attends Restoration service: Not on file     Active member of club or organization: Not on file     Attends meetings of clubs or organizations: Not on file     Relationship status: Not on file    Intimate partner violence     Fear of current or ex partner: Not on file     Emotionally abused: Not on file     Physically abused: Not on file     Forced sexual activity: Not on file   Other Topics Concern    Not on file   Social History Narrative    Occasional Caffeine Consumption       Allergies   Allergen Reactions    Fenofibrate Other (See Comments)      blood in urine  hx  Kidney Failure    Colesevelam Other (See Comments)      leg pains    Colestipol Itching and Other (See Comments)      Swelling lower legs    Ezetimibe GI Intolerance    Pollen Extract Allergic Rhinitis    Statins Myalgia         Current Outpatient Medications:     amLODIPine (NORVASC) 2 5 mg tablet, Take 1 tablet (2 5 mg total) by mouth daily, Disp: 90 tablet, Rfl: 2    chlorthalidone 25 mg tablet, Take 1 tablet (25 mg total) by mouth daily, Disp: 90 tablet, Rfl: 2    cloNIDine (CATAPRES-TTS-3) 0 3 mg/24 hr, Place 1 patch (0 3 mg total) on the skin once a week, Disp: 12 patch, Rfl: 2    clopidogrel (PLAVIX) 75 mg tablet, TAKE 1 TABLET BY MOUTH EVERY DAY, Disp: 90 tablet, Rfl: 1    Icosapent Ethyl (VASCEPA) 1 g CAPS, 2 cap bid with meals, Disp: 360 capsule, Rfl: 2    labetalol (NORMODYNE) 300 mg tablet, Take 1 tablet (300 mg total) by mouth 2 (two) times a day, Disp: 270 tablet, Rfl: 2   losartan (COZAAR) 100 MG tablet, Take 1 tablet (100 mg total) by mouth every morning, Disp: 90 tablet, Rfl: 2

## 2020-09-14 NOTE — PATIENT INSTRUCTIONS
Peripheral arterial disease with intermittent claudication  -no wounds or rest pain  -LDL significantly elevated at 205; goal less than 70  -consider injectable cholesterol medication  -continue with Plavix  -regular walking program  -follow-up in 3 months or sooner if wounds or increased pain      RIGHT renal artery stenosis with left small kidney and hypertension  -currently on 5 medicines for blood pressure  -blood pressure in the office today in excess of 446 systolically (thinks she's nervous)  -journal blood pressures at home and bring him into the office to Nephrology  - ? Consider R renal angiogram    Carotid artery stenosis  -asymptomatic  -reviewed symptoms of stroke for which she called 911        Symptoms of stroke:  - Unable to speak or understand speech  - Unable to move one side of the body (arm or leg)  - Visual changes  - Call 911 for any symptoms of stroke      No smoking            Peripheral Artery Disease   AMBULATORY CARE:   Peripheral artery disease (PAD)  is narrow, weak, or blocked arteries  It may affect any arteries outside of your heart and brain  PAD is usually the result of a buildup of fat and cholesterol, also called plaque, along your artery walls  Inflammation, a blood clot, or abnormal cell growth could also block your arteries  PAD prevents normal blood flow to your legs and arms  You are at risk of an amputation if poor blood flow keeps wounds from healing or causes gangrene (tissue death)  Without treatment, PAD can also cause a heart attack or stroke  Common symptoms include:  Mild PAD usually does not cause symptoms   As the disease worsens over time, you may have the following:  · Pain or cramps in your leg or hip while you walk     · A numb, weak, or heavy feeling in your legs     · Dry, scaly, red, or pale skin on your legs     · Thick or brittle nails, or hair loss on your arms and legs     · Foot sores that will not heal     · Burning or aching in your feet and toes while resting (this may be worse when you lie down)  Call 911 for the following:   · You have any of the following signs of a heart attack:      ¨ Squeezing, pressure, or pain in your chest that lasts longer than 5 minutes or returns    ¨ Discomfort or pain in your back, neck, jaw, stomach, or arm     ¨ Trouble breathing    ¨ Nausea or vomiting    ¨ Lightheadedness or a sudden cold sweat, especially with chest pain or trouble breathing    · You have any of the following signs of a stroke:      ¨ Numbness or drooping on one side of your face     ¨ Weakness in an arm or leg    ¨ Confusion or difficulty speaking    ¨ Dizziness, a severe headache, or vision loss  Seek care immediately if:   · You have sores or wounds that will not heal      · You notice black or discolored skin on your arm or leg  · Your skin is cool to the touch  Contact your healthcare provider if:   · You have leg pain when you walk 1/8 mile (200 meters) or less, even with treatment  · Your legs are red, dry, or pale, even with treatment  · You have questions or concerns about your condition or care  Treatment for PAD  can help reduce your risk of a heart attack, stroke, or amputation  You may need more than one of the following:  · Medicines  may be given to prevent blood clots and reduce the risk of a heart attack or stroke  You may be given medicine to help prevent your PAD from getting worse  · A supervised exercise program  helps you stay active in normal daily activities and may prevent disability  Healthcare providers will help you safely walk or do strength training exercises 3 times a week for 30 to 60 minutes  You will do this for several months, then transition to walking on your own  · Angioplasty  is a procedure to open your artery so blood can flow through normally  A thin tube called a catheter is used to insert a small balloon into your artery  The balloon is inflated to open your blocked artery, and then removed   A tube called a stent may be placed in your artery to hold it open  · Bypass surgery  is used to make a new connection to your artery with a vein from another part of your body, or an artificial graft  The vein or graft is attached to your artery above and below your blockage  This allows blood to flow around the blocked portion of your artery  Manage and prevent PAD:   · Walk for 30 to 60 minutes at least 4 times a week  Your healthcare provider may also refer you to an supervised exercise program  The program helps increase how far you can walk without pain  It also helps you stay active in normal daily activities and may prevent disability caused by PAD  · Do not smoke  Nicotine and other chemicals in cigarettes and cigars can worsen PAD  They can also increase your risk for a heart attack or stroke  Ask your healthcare provider for information if you currently smoke and need help to quit  E-cigarettes or smokeless tobacco still contain nicotine  Talk to your healthcare provider before you use these products  · Manage other health conditions  Take your medicines as directed and follow your healthcare provider's instructions if you have high blood pressure or high cholesterol  Perform foot care and check your blood sugar levels as directed if you have diabetes  · Eat heart healthy foods  Eat whole grains, fruits, and vegetables every day  Limit salt and high-fat foods  Ask your healthcare provider for more information on a heart healthy diet  Ask if you need to lose weight  Your healthcare provider can help you create a healthy weight-loss plan  Follow up with your healthcare provider as directed:  Write down your questions so you remember to ask them during your visits  © 2017 [employer] Information is for End User's use only and may not be sold, redistributed or otherwise used for commercial purposes   All illustrations and images included in CareNotes® are the copyrighted property of [employer]  or [employer]  The above information is an  only  It is not intended as medical advice for individual conditions or treatments  Talk to your doctor, nurse or pharmacist before following any medical regimen to see if it is safe and effective for you

## 2020-09-16 ENCOUNTER — OFFICE VISIT (OUTPATIENT)
Dept: INTERNAL MEDICINE CLINIC | Facility: CLINIC | Age: 75
End: 2020-09-16
Payer: COMMERCIAL

## 2020-09-16 VITALS
SYSTOLIC BLOOD PRESSURE: 140 MMHG | HEIGHT: 59 IN | DIASTOLIC BLOOD PRESSURE: 60 MMHG | BODY MASS INDEX: 34.88 KG/M2 | WEIGHT: 173 LBS | RESPIRATION RATE: 18 BRPM | TEMPERATURE: 97.6 F | HEART RATE: 75 BPM | OXYGEN SATURATION: 98 %

## 2020-09-16 DIAGNOSIS — I70.0 AORTOILIAC STENOSIS, LEFT (HCC): Primary | ICD-10-CM

## 2020-09-16 DIAGNOSIS — N63.0 BREAST MASS: ICD-10-CM

## 2020-09-16 DIAGNOSIS — N18.30 STAGE 3 CHRONIC KIDNEY DISEASE (HCC): ICD-10-CM

## 2020-09-16 DIAGNOSIS — E11.69 HYPERLIPIDEMIA ASSOCIATED WITH TYPE 2 DIABETES MELLITUS (HCC): ICD-10-CM

## 2020-09-16 DIAGNOSIS — E55.9 VITAMIN D DEFICIENCY: ICD-10-CM

## 2020-09-16 DIAGNOSIS — E78.5 HYPERLIPIDEMIA ASSOCIATED WITH TYPE 2 DIABETES MELLITUS (HCC): ICD-10-CM

## 2020-09-16 DIAGNOSIS — E11.21 TYPE 2 DIABETES MELLITUS WITH DIABETIC NEPHROPATHY, WITHOUT LONG-TERM CURRENT USE OF INSULIN (HCC): ICD-10-CM

## 2020-09-16 PROCEDURE — 99214 OFFICE O/P EST MOD 30 MIN: CPT | Performed by: INTERNAL MEDICINE

## 2020-09-16 RX ORDER — CILOSTAZOL 100 MG/1
100 TABLET ORAL 2 TIMES DAILY
Qty: 60 TABLET | Refills: 5 | Status: SHIPPED | OUTPATIENT
Start: 2020-09-16 | End: 2021-01-20 | Stop reason: SDUPTHER

## 2020-09-16 NOTE — PATIENT INSTRUCTIONS
1 patient with severe aortoiliac disease however she is able to walk 100 yd if she walks slowly without pain will place or implantable 100 mg twice a day patient will be getting at home exercise bike and will refer to physical therapy to see if we can increase exercise tolerance so she can avoid surgery especially since she has chronic kidney disease and this could adversely impact her kidney function, will also refer to podiatry for surveillance to decrease the risk of lower extremity wounds she should have follow-up in 3 months  2  Type 2 diabetes A1c is near goal will need follow-up in 3 months A1c at that time patient will be decreasing her milk shakes if A1c remains above 7 in 3 months she should be started on medication for her diabetes  3  Hypertension which has been difficult to control for many years is near goal  4  Patient with statin intolerance severe vascular disease she is willing to take a P SK 9 inhibitor she will be contacting her cardiologist Dr Ahmet Goldsmith for  5  Renovascular hypertension   Strongly recommend she stop smoking  6   Patient with history of breast mass will refer Dr Shayy Lunsford for further follow-up

## 2020-09-16 NOTE — LETTER
September 16, 2020     Adela Wilkes, [address] [facility]  [address]  [address]  [facility]    Patient: Rogers Multani   YOB: 1945   Date of Visit: 9/16/2020       Dear Dr Loreli Denver:    Thank you for referring Nusrat Auguste to me for evaluation  Below are my notes for this consultation  If you have questions, please do not hesitate to call me  I look forward to following your patient along with you  Sincerely,        Uyen Doyle,         CC: MD Maria G Winchester MD Charmian Main, MD Delaine Gayer,   9/16/2020  9:49 AM  Sign when Signing Visit  Assessment/Plan:  1 patient with severe aortoiliac disease however she is able to walk 100 yd if she walks slowly without pain will place or implantable 100 mg twice a day patient will be getting at home exercise bike and will refer to physical therapy to see if we can increase exercise tolerance so she can avoid surgery especially since she has chronic kidney disease and this could adversely impact her kidney function, will also refer to podiatry for surveillance to decrease the risk of lower extremity wounds she should have follow-up in 3 months  2  Type 2 diabetes A1c is near goal will need follow-up in 3 months A1c at that time patient will be decreasing her milk shakes if A1c remains above 7 in 3 months she should be started on medication for her diabetes  3  Hypertension which has been difficult to control for many years is near goal  4  Patient with statin intolerance severe vascular disease she is willing to take a P SK 9 inhibitor she will be contacting her cardiologist Dr Munir Lopez for  5  Renovascular hypertension   Strongly recommend she stop smoking  6  Patient with history of breast mass will refer Dr Griselda Bloom for further follow-up  Strongly recommend she stop smoking         Diagnoses and all orders for this visit:    Aortoiliac stenosis, left (HCC)  -     cilostazol (PLETAL) 100 mg tablet;  Take 1 tablet (100 mg total) by mouth 2 (two) times a day  -     Ambulatory referral to Physical Therapy; Future  -     Ambulatory referral to Vascular Surgery; Future  -     Ambulatory referral to Podiatry; Future    Hyperlipidemia associated with type 2 diabetes mellitus ([place] [address] )    Vitamin D deficiency    Type 2 diabetes mellitus with diabetic nephropathy, without long-term current use of insulin ([place] [address] )  -     Ambulatory referral to Podiatry; Future    Stage 3 chronic kidney disease ([place] [address] )    Breast mass  -     Ambulatory referral to Surgical Oncology; Future        The patient was counseled regarding instructions for management, risk factor reductions, patient and family education,impressions, risks and benefits of treatment options, side effects of medications, importance of compliance with treatment  The treatment plan was reviewed with the patient/guardian and patient/guardian understands and agrees with the treatment plan  Current Outpatient Medications:     amLODIPine (NORVASC) 2 5 mg tablet, Take 1 tablet (2 5 mg total) by mouth daily, Disp: 90 tablet, Rfl: 2    chlorthalidone 25 mg tablet, Take 1 tablet (25 mg total) by mouth daily, Disp: 90 tablet, Rfl: 2    cloNIDine (CATAPRES-TTS-3) 0 3 mg/24 hr, Place 1 patch (0 3 mg total) on the skin once a week, Disp: 12 patch, Rfl: 2    clopidogrel (PLAVIX) 75 mg tablet, TAKE 1 TABLET BY MOUTH EVERY DAY, Disp: 90 tablet, Rfl: 1    Icosapent Ethyl (VASCEPA) 1 g CAPS, 2 cap bid with meals, Disp: 360 capsule, Rfl: 2    labetalol (NORMODYNE) 300 mg tablet, Take 1 tablet (300 mg total) by mouth 2 (two) times a day, Disp: 270 tablet, Rfl: 2    losartan (COZAAR) 100 MG tablet, Take 1 tablet (100 mg total) by mouth every morning, Disp: 90 tablet, Rfl: 2    cilostazol (PLETAL) 100 mg tablet, Take 1 tablet (100 mg total) by mouth 2 (two) times a day, Disp: 60 tablet, Rfl: 5    Subjective:      Patient ID: Bhakti Joyner is a 70 y o  female      HPI    The following portions of the patient's history were reviewed and updated as appropriate:   She has a past medical history of Arthritis, Chronic kidney disease, Coronary artery disease, Diabetes mellitus ([facility] [address] ), Endometriosis, High cholesterol, Hypertension, Kidney disease, chronic, stage III (moderate, EGFR 30-59 ml/min) (HCC), Lumbar disc herniation, Neuropathy, Peripheral vascular disease ([facility] [address] ), Pneumonia, Shoulder injury, Spinal stenosis, and Stroke ([facility] [address] ) (2015)  ,  does not have any pertinent problems on file  ,   has a past surgical history that includes Rotator cuff repair (Left); Tonsillectomy; Ovarian cyst surgery; Fracture surgery (Right); Colonoscopy; Cardiac catheterization; [place] retrograde pyelogram (4/6/2020); pr cystourethroscopy,fulgur 0 5-2 cm lesn (N/A, 4/6/2020); pr cystourethroscopy,ureter catheter (Bilateral, 4/6/2020); and pr cystoscopy,insert ureteral stent (Right, 4/6/2020)  ,  family history includes Arthritis in her brother; Heart defect in her father; Heart disease in her mother; Hyperlipidemia in her mother; Hypertension in her father and mother; Other in her brother; Stroke in her sister  ,   reports that she quit smoking about 7 weeks ago  Her smoking use included cigarettes  She smoked 0 00 packs per day  She has never used smokeless tobacco  She reports that she does not drink alcohol or use drugs  ,  is allergic to fenofibrate; colesevelam; colestipol; ezetimibe; pollen extract; and statins       Review of Systems   Constitutional: Negative for appetite change, chills, fatigue, fever and unexpected weight change  HENT: Negative for congestion, ear pain, facial swelling, hearing loss, mouth sores, nosebleeds, postnasal drip, rhinorrhea, sinus pain, sore throat, trouble swallowing and voice change  Eyes: Negative for pain, discharge, redness and visual disturbance  Respiratory: Negative for apnea, chest tightness, shortness of breath, wheezing and stridor      Cardiovascular: Negative for chest pain, palpitations and leg swelling  Gastrointestinal: Negative for abdominal distention, abdominal pain, blood in stool, constipation, diarrhea and vomiting  Endocrine: Negative for cold intolerance, heat intolerance, polydipsia, polyphagia and polyuria  Genitourinary: Negative for difficulty urinating, dysuria, flank pain, frequency, genital sores, hematuria and urgency  Musculoskeletal: Negative for arthralgias and back pain  Skin: Negative for rash and wound  Allergic/Immunologic: Negative for environmental allergies, food allergies and immunocompromised state  Neurological: Negative for dizziness, tremors, seizures, syncope, facial asymmetry, speech difficulty, weakness, light-headedness, numbness and headaches  Hematological: Negative for adenopathy  Does not bruise/bleed easily  Psychiatric/Behavioral: Negative for agitation, behavioral problems, dysphoric mood, hallucinations, self-injury, sleep disturbance and suicidal ideas  The patient is not hyperactive            Objective:  /60 (BP Location: Left arm, Patient Position: Sitting)   Pulse 75   Temp 97 6 °F (36 4 °C)   Resp 18   Ht 4' 11" (1 499 m)   Wt 78 5 kg (173 lb)   LMP  (LMP Unknown)   SpO2 98%   BMI 34 94 kg/m²     Lab Review  Orders Only on 09/08/2020   Component Date Value    White Blood Cell Count 09/08/2020 8 5     Red Blood Cell Count 09/08/2020 4 84     Hemoglobin 09/08/2020 13 9     HCT 09/08/2020 41 0     MCV 09/08/2020 85     MCH 09/08/2020 28 7     MCHC 09/08/2020 33 9     RDW 09/08/2020 13 8     Platelet Count 16/73/1696 227     Neutrophils 09/08/2020 67     Lymphocytes 09/08/2020 18     Monocytes 09/08/2020 9     Eosinophils 09/08/2020 4     Basophils PCT 09/08/2020 1     Neutrophils (Absolute) 09/08/2020 5 7     Lymphocytes (Absolute) 09/08/2020 1 5     Monocytes (Absolute) 09/08/2020 0 8     Eosinophils (Absolute) 09/08/2020 0 4     Basophils ABS 09/08/2020 0 1     Immature Granulocytes 09/08/2020 1     Immature Granulocytes (A* 09/08/2020 0 1     Glucose, Random 09/08/2020 179*    BUN 09/08/2020 42*    Creatinine 09/08/2020 2 02*    eGFR Non  09/08/2020 24*    eGFR  09/08/2020 28*    SL AMB BUN/CREATININE RA* 09/08/2020 21     Sodium 09/08/2020 138     Potassium 09/08/2020 4 0     Chloride 09/08/2020 97     CO2 09/08/2020 24     CALCIUM 09/08/2020 9 9     Protein, Total 09/08/2020 7 3     Albumin 09/08/2020 4 5     Globulin, Total 09/08/2020 2 8     Albumin/Globulin Ratio 09/08/2020 1 6     TOTAL BILIRUBIN 09/08/2020 0 5     Alk Phos Isoenzymes 09/08/2020 107     AST 09/08/2020 14     ALT 09/08/2020 13     Specific Gravity 09/08/2020 1 020     Ph 09/08/2020 6 5     Color UA 09/08/2020 Yellow     Urine Appearance 09/08/2020 Clear     Leukocyte Esterase 09/08/2020 1+*    Protein 09/08/2020 1+*    Glucose, 24 HR Urine 09/08/2020 Negative     Ketone, Urine 09/08/2020 Negative     Blood, Urine 09/08/2020 Negative     Bilirubin, Urine 09/08/2020 Negative     Urobilinogen Urine 09/08/2020 0 2     SL AMB NITRITES URINE, Q* 09/08/2020 Negative     Microscopic Examination 09/08/2020 See below:     Urinalysis Reflex 09/08/2020 Comment     SL AMB WBC, URINE 09/08/2020 11-30*    RBC, Urine 09/08/2020 0-2     Epithelial Cells (non re* 09/08/2020 0-10     Bacteria, Urine 09/08/2020 Few     Urine Culture Result 09/08/2020 Final report*    Result 1 09/08/2020 Citrobacter freundii*    SL AMB ANTIMICROBIAL JOS* 09/08/2020 Comment     Phosphorus, Serum 09/08/2020 3 9     Cholesterol, Total 09/08/2020 311*    Triglycerides 09/08/2020 344*    HDL 09/08/2020 36*    LDL Calculated 09/08/2020 205*    Comment 09/08/2020 Comment     LDL Direct 09/08/2020 211*    Creatinine, Urine 09/08/2020 119 2     Microalbum  ,U,Random 09/08/2020 274 8     Microalb/Creat Ratio 09/08/2020 231*    Hemoglobin A1C 09/08/2020 7 3*    TSH 09/08/2020 3 320     Magnesium, Serum 09/08/2020 2 3    Procedure visit on 08/14/2020   Component Date Value    LEUKOCYTE ESTERASE,UA 08/14/2020 NEG     NITRITE,UA 08/14/2020 NEG     SL AMB POCT UROBILINOGEN 08/14/2020 NEG     POCT URINE PROTEIN 08/14/2020 30      PH,UA 08/14/2020 5 0     BLOOD,UA 08/14/2020 NEG     SPECIFIC GRAVITY,UA 08/14/2020 1 025     KETONES,UA 08/14/2020 NEG     BILIRUBIN,UA 08/14/2020 NEG     GLUCOSE, UA 08/14/2020 NEG      COLOR,UA 08/14/2020 Clear     CLARITY,UA 08/14/2020 Yellow    Billing Encounter on 07/15/2020   Component Date Value    Color, UA 07/15/2020 Dk Yellow     Clarity, UA 07/15/2020 Cloudy     Specific Gravity, UA 07/15/2020 1 024     pH, UA 07/15/2020 5 5     Leukocytes, UA 07/15/2020 Large*    Nitrite, UA 07/15/2020 Negative     Protein, UA 07/15/2020 100 (2+)*    Glucose, UA 07/15/2020 Negative     Ketones, UA 07/15/2020 Negative     Urobilinogen, UA 07/15/2020 0 2     Bilirubin, UA 07/15/2020 Interference- unable to analyze*    Blood, UA 07/15/2020 Negative     RBC, UA 07/15/2020 None Seen     WBC, UA 07/15/2020 30-50*    Epithelial Cells 07/15/2020 Occasional     Bacteria, UA 07/15/2020 Occasional     AMORPH URATES 07/15/2020 Occasional     Urine Culture 07/15/2020 70,000-79,000 cfu/ml Citrobacter freundii*   Office Visit on 07/15/2020   Component Date Value    LEUKOCYTE ESTERASE,UA 07/15/2020 2+     NITRITE,UA 07/15/2020 nit+     SL AMB POCT UROBILINOGEN 07/15/2020 0 2     POCT URINE PROTEIN 07/15/2020 neg      PH,UA 07/15/2020 5 0     BLOOD,UA 07/15/2020 neg     SPECIFIC GRAVITY,UA 07/15/2020 1 030     KETONES,UA 07/15/2020 neg     BILIRUBIN,UA 07/15/2020 neg     GLUCOSE, UA 07/15/2020 neg      COLOR,UA 07/15/2020 dark yellow     CLARITY,UA 07/15/2020 cloudy     Hemoglobin A1C 07/15/2020 6 8*   Orders Only on 06/17/2020   Component Date Value    White Blood Cell Count 06/17/2020 7 0     Red Blood Cell Count 06/17/2020 4 60     Hemoglobin 06/17/2020 13 8     HCT 06/17/2020 39 6     MCV 06/17/2020 86     MCH 06/17/2020 30 0     MCHC 06/17/2020 34 8     RDW 06/17/2020 13 2     Platelet Count 50/84/0923 224     Neutrophils 06/17/2020 59     Lymphocytes 06/17/2020 26     Monocytes 06/17/2020 9     Eosinophils 06/17/2020 4     Basophils PCT 06/17/2020 1     Neutrophils (Absolute) 06/17/2020 4 1     Lymphocytes (Absolute) 06/17/2020 1 8     Monocytes (Absolute) 06/17/2020 0 6     Eosinophils (Absolute) 06/17/2020 0 3     Basophils ABS 06/17/2020 0 1     Immature Granulocytes 06/17/2020 1     Immature Granulocytes (A* 06/17/2020 0 1     Glucose, Random 06/17/2020 145*    BUN 06/17/2020 28     Creatinine 06/17/2020 1 61     eGFR Non  06/17/2020 CANCELED     eGFR  06/17/2020 CANCELED     SL AMB BUN/CREATININE RA* 06/17/2020 17     Sodium 06/17/2020 140     Potassium 06/17/2020 4 3     Chloride 06/17/2020 101     CO2 06/17/2020 21     CALCIUM 06/17/2020 9 6     Protein, Total 06/17/2020 7 0     Albumin 06/17/2020 4 4     Globulin, Total 06/17/2020 2 6     Albumin/Globulin Ratio 06/17/2020 1 7     TOTAL BILIRUBIN 06/17/2020 0 3     Alk Phos Isoenzymes 06/17/2020 87     AST 06/17/2020 17     ALT 06/17/2020 17     Cholesterol, Total 06/17/2020 299     Triglycerides 06/17/2020 281     HDL 06/17/2020 42     VLDL Cholesterol Calcula* 06/17/2020 56*    LDL Calculated 06/17/2020 201     Comment 06/17/2020 Comment     Creatinine, Urine 06/17/2020 97 1     Microalbum  ,U,Random 06/17/2020 473 4     Microalb/Creat Ratio 06/17/2020 488*    LDL Direct 06/17/2020 198     Uric Acid 06/17/2020 8 1     Magnesium, Serum 06/17/2020 2 0    Orders Only on 06/16/2020   Component Date Value    PTH, Intact 06/16/2020 86*   Orders Only on 06/16/2020   Component Date Value    White Blood Cell Count 06/16/2020 7 2     Red Blood Cell Count 06/16/2020 4 77     Hemoglobin 06/16/2020 13 8     HCT 06/16/2020 41 5     MCV 06/16/2020 87     MCH 06/16/2020 28 9     MCHC 06/16/2020 33 3     RDW 06/16/2020 13 0     Platelet Count 86/77/3167 247     Neutrophils 06/16/2020 59     Lymphocytes 06/16/2020 25     Monocytes 06/16/2020 10     Eosinophils 06/16/2020 4     Basophils PCT 06/16/2020 1     Neutrophils (Absolute) 06/16/2020 4 3     Lymphocytes (Absolute) 06/16/2020 1 8     Monocytes (Absolute) 06/16/2020 0 7     Eosinophils (Absolute) 06/16/2020 0 3     Basophils ABS 06/16/2020 0 1     Immature Granulocytes 06/16/2020 1     Immature Granulocytes (A* 06/16/2020 0 1     Specific Gravity 06/16/2020 1 016     Ph 06/16/2020 6 0     Color UA 06/16/2020 Yellow     Urine Appearance 06/16/2020 Cloudy*    Leukocyte Esterase 06/16/2020 2+*    Protein 06/16/2020 2+*    Glucose, 24 HR Urine 06/16/2020 Negative     Ketone, Urine 06/16/2020 Negative     Blood, Urine 06/16/2020 Negative     Bilirubin, Urine 06/16/2020 Negative     Urobilinogen Urine 06/16/2020 0 2     SL AMB NITRITES URINE, Q* 06/16/2020 Positive*    Microscopic Examination 06/16/2020 See below:     SL AMB WBC, URINE 06/16/2020 11-30*    RBC, Urine 06/16/2020 0-2     Epithelial Cells (non re* 06/16/2020 0-10     Bacteria, Urine 06/16/2020 Few     Glucose, Random 06/16/2020 146*    BUN 06/16/2020 29*    Creatinine 06/16/2020 1 58*    eGFR Non  06/16/2020 32*    eGFR  06/16/2020 37*    SL AMB BUN/CREATININE RA* 06/16/2020 18     Sodium 06/16/2020 140     Potassium 06/16/2020 4 7     Chloride 06/16/2020 102     CO2 06/16/2020 22     CALCIUM 06/16/2020 9 8     Creatinine, Urine 06/16/2020 91 8     Microalbum  ,U,Random 06/16/2020 474 3     Microalb/Creat Ratio 06/16/2020 517*    Uric Acid 06/16/2020 8 1*    Phosphorus, Serum 06/16/2020 3 7    Admission on 04/06/2020, Discharged on 04/06/2020   Component Date Value    Case Report 04/06/2020                      Value:Surgical Pathology Report Case: [id number]                                   Authorizing Provider:  Marta Penn MD        Collected:           04/06/2020 0847              Ordering Location:     [facility]        Received:            04/06/2020 [address] [address]                                                      Pathologist:           [id number] Indra Cummins MD                                                        Specimen:    Urinary Bladder, bladder tumor right trigonal                                              Final Diagnosis 04/06/2020                      Value: This result contains rich text formatting which cannot be displayed here   Additional Information 04/06/2020                      Value: This result contains rich text formatting which cannot be displayed here  Addy Ratliff Gross Description 04/06/2020                      Value: This result contains rich text formatting which cannot be displayed here     Office Visit on 03/24/2020   Component Date Value    Ventricular Rate 03/24/2020 63     Atrial Rate 03/24/2020 63     OH Interval 03/24/2020 170     QRSD Interval 03/24/2020 80     QT Interval 03/24/2020 422     QTC Interval 03/24/2020 431     P Axis 03/24/2020 53     QRS Axis 03/24/2020 -10     T Wave Axis 03/24/2020 108    Appointment on 03/24/2020   Component Date Value    ABO Grouping 03/24/2020 A     Rh Factor 03/24/2020 Negative     Antibody Screen 03/24/2020 Negative     Specimen Expiration Date 03/24/2020 85616730     PTT 03/24/2020 34     Protime 03/24/2020 12 7     INR 03/24/2020 0 96     WBC 03/24/2020 7 11     RBC 03/24/2020 4 94     Hemoglobin 03/24/2020 14 1     Hematocrit 03/24/2020 44 7     MCV 03/24/2020 91     MCH 03/24/2020 28 5     MCHC 03/24/2020 31 5     RDW 03/24/2020 13 2     MPV 03/24/2020 10 4     Platelets 84/79/9956 210     nRBC 03/24/2020 0     Neutrophils Relative 03/24/2020 64     Immat GRANS % 03/24/2020 1  Lymphocytes Relative 2020 21     Monocytes Relative 2020 8     Eosinophils Relative 2020 5     Basophils Relative 2020 1     Neutrophils Absolute 2020 4 58     Immature Grans Absolute 2020 0 07     Lymphocytes Absolute 2020 1 48     Monocytes Absolute 2020 0 58     Eosinophils Absolute 2020 0 34     Basophils Absolute 2020 0 06     Sodium 2020 143     Potassium 2020 4 6     Chloride 2020 106     CO2 2020 27     ANION GAP 2020 10     BUN 2020 30*    Creatinine 2020 1 71*    Glucose, Fasting 2020 148*    Calcium 2020 9 7     eGFR 2020 29     Urine Culture 2020 50,000-59,000 cfu/ml Escherichia coli*   There may be more visits with results that are not included           Imaging  @[email or web address]  Recent Results (from the past 66985 hour(s))   ECG 12 lead   Result Value    Ventricular Rate 63    Atrial Rate 63    GA Interval 170    QRSD Interval 80    QT Interval 422    QTC Interval 431    P Axis 53    QRS Axis -10    T Wave Axis 108    Narrative    Normal sinus rhythm  Left ventricular hypertrophy with repolarization abnormality  Anteroseptal infarct (cited on or before 2019)  Cannot rule out Inferior infarct  When compared with ECG of 2019 10:49,  No significant change was found  Confirmed by Jose Monroy ([id number]) on 3/24/2020 1:04:57 PM   NM myocardial perfusion spect (rx stress and/or rest)    [address] [address] [phone]    Rest/Stress Gated SPECT Myocardial Perfusion Imaging After Regadenoson    Patient: Parag Jain  MR number: [id number]  Account number: [de-identified]  : 1945  Age: 69 years  Gender: Female  Status: Outpatient  Location: Stress lab  Height: 58 in  Weight: 163 lb  BP: 206/ 84 mmHg    Allergies: ATORVASTATIN, COLESEVELAM, COLESTIPOL, EZETIMIBE, FENOFIBRATE, POLLEN EXTRACT, ROSUVASTATIN, STATINS    Diagnosis: R94 31 - Abnormal electrocardiogram [ECG] [EKG]    Primary Physician:  Peace Colindres DO  Referring Physician:  Peace Colindres DO  Group:  [facility]  Other:  Evan Sam MS, CCT  Interpreting Physician:  Vanita Garnett MD    INDICATIONS: Detection of coronary artery disease  Pre-operative risk assessment  HISTORY: The patient is a 69year old  female  Chest pain status: no chest pain  Coronary artery disease risk factors: dyslipidemia, hypertension, smoking, diabetes mellitus, and post-menopausal state  Cardiovascular history:  peripheral vascular occlusive disease, stroke, and transient ischemic attack  Co-morbidity: history of renal disease and obesity  GERD, renal and celiac stenosis  Medications: a beta blocker, a calcium channel blocker, an ACE  inhibitor/ARB, clopidogrel, and an antihypertensive agent  Previous test results: abnormal ECG and hyperlipidemia  PHYSICAL EXAM: Baseline physical exam screening: normal     REST ECG: Normal sinus rhythm  Nonspecific ST and T wave abnormalities were present  PROCEDURE: The study was performed in the the Stress lab  A regadenoson infusion pharmacologic stress test was performed  Gated SPECT myocardial perfusion imaging was performed after stress and at rest  Systolic blood pressure was 206  mmHg, at the start of the study  Diastolic blood pressure was 84 mmHg, at the start of the study  The heart rate was 63 bpm, at the start of the study  Regadenoson protocol:  HR bpm SBP mmHg DBP mmHg Symptoms Rhythm/conduct  Baseline 63 206 84 none NSR, no ectopy  Immediate 83 126 84 mild dyspnea, nausea NSR, no ectopy  1 min 76 -- -- none NSR, no ectopy  2 min 71 184 78 none NSR, no ectopy  Rest SpO2 98, Peak Stress SpO2 99    STRESS SUMMARY: Duration of pharmacologic stress was 3 min   Maximal heart rate during stress was 83 bpm  The rate-pressure product for the peak heart rate and blood pressure was 36039  There was no chest pain during stress  The stress test  was terminated due to protocol completion  The stress ECG was negative for ischemia and normal     ISOTOPE ADMINISTRATION:  Resting isotope administration Stress isotope administration  Agent Tetrofosmin Tetrofosmin  Dose 9 9 mCi 29 6 mCi  Date 2019  Injection-image interval 30 min 30 min    The radiopharmaceutical was injected at the peak effect of pharmacologic stress  MYOCARDIAL PERFUSION IMAGING:  The image quality was good  PERFUSION DEFECTS:  -  There was a small, mildly severe, partially reversible myocardial perfusion defect of anterior and inferior wall  -  There was of the inferior and anterior wall  GATED SPECT:  The calculated left ventricular ejection fraction was 61 %  There was no left ventricular regional abnormality  SUMMARY:  -  Stress results: There was no chest pain during stress  -  ECG conclusions: The stress ECG was negative for ischemia and normal   -  Perfusion imaging: There was a small, mildly severe, partially reversible myocardial perfusion defect of anterior and inferior wall There was of the inferior and anterior wall  -  Gated SPECT: The calculated left ventricular ejection fraction was 61 %  There was no left ventricular regional abnormality  Prepared and signed by    Christos Wesley MD  Signed 2019 17:53:08     Echo complete with contrast if indicated    Narrative    [address]  [place], [place] [id number] [phone]    Transthoracic Echocardiogram  2D, M-mode, Doppler, and Color Doppler    Study date:  2019    Patient: Jorge Hernandez  MR number: [id number]  Account number: [de-identified]  : 15-Juma-1945  Age: 69 years  Gender: Female  Status: Outpatient  Location: Echo lab  Height: 58 in  Weight: 163 lb  BP: 216/ 86 mmHg    Indications: Abnormal EKG, Assess left ventricular function      Diagnoses: R94 31 - Abnormal electrocardiogram [ECG] [EKG]    Sonographer:  April [address] [facility] [address] [facility]  Referring Physician:  Miguel Bryan DO  Group:  [facility]  Interpreting Physician:  Vanita Garnett MD    SUMMARY    LEFT VENTRICLE:  Systolic function was normal  Ejection fraction was estimated to be 60 %  There were no regional wall motion abnormalities  Wall thickness was mildly increased  Doppler parameters were consistent with abnormal left ventricular relaxation (grade 1 diastolic dysfunction)  Doppler parameters were consistent with elevated ventricular end-diastolic filling pressure  LEFT ATRIUM:  The atrium was mildly dilated  MITRAL VALVE:  There was mild annular calcification  There was mild regurgitation  TRICUSPID VALVE:  There was trace regurgitation  AORTA:  The root exhibited normal size and mild fibrocalcific change  HISTORY: Consistent with transient ischemic attack or stroke  PRIOR HISTORY: CKD, Risk factors: hypertension, diabetes, and hypercholesterolemia  PROCEDURE: The procedure was performed in the echo lab  This was a routine study  The transthoracic approach was used  The study included complete 2D imaging, M-mode, complete spectral Doppler, and color Doppler  The heart rate was 60 bpm,  at the start of the study  Images were obtained from the parasternal, apical, subcostal, and suprasternal notch acoustic windows  Image quality was adequate  LEFT VENTRICLE: Size was normal  Systolic function was normal  Ejection fraction was estimated to be 60 %  There were no regional wall motion abnormalities  Wall thickness was mildly increased  No evidence of apical thrombus  DOPPLER:  Doppler parameters were consistent with abnormal left ventricular relaxation (grade 1 diastolic dysfunction)  Doppler parameters were consistent with elevated ventricular end-diastolic filling pressure      RIGHT VENTRICLE: The size was normal  Systolic function was normal  Wall thickness was normal     LEFT ATRIUM: The atrium was mildly dilated  RIGHT ATRIUM: Size was normal     MITRAL VALVE: There was mild annular calcification  Valve structure was normal  There was mild-moderate calcification  There was mildly reduced leaflet separation  DOPPLER: The transmitral velocity was within the normal range  There was no  evidence for stenosis  There was mild regurgitation  AORTIC VALVE: The valve was trileaflet  Leaflets exhibited normal thickness and normal cuspal separation  DOPPLER: Transaortic velocity was within the normal range  There was no evidence for stenosis  There was no significant  regurgitation  TRICUSPID VALVE: The valve structure was normal  There was normal leaflet separation  DOPPLER: The transtricuspid velocity was within the normal range  There was no evidence for stenosis  There was trace regurgitation  PULMONIC VALVE: Leaflets exhibited normal thickness, no calcification, and normal cuspal separation  DOPPLER: The transpulmonic velocity was within the normal range  There was no significant regurgitation  PERICARDIUM: There was no pericardial effusion  The pericardium was normal in appearance  AORTA: The root exhibited normal size and mild fibrocalcific change  SYSTEMIC VEINS: IVC: The inferior vena cava was normal in size      SYSTEM MEASUREMENT TABLES    2D  %FS: 30 9 %  Ao Diam: 2 7 cm  EDV(Teich): 80 7 ml  EF(Teich): 58 9 %  ESV(Teich): 33 1 ml  IVSd: 1 1 cm  LA Area: 17 cm2  LA Diam: 3 9 cm  LVEDV MOD A4C: 115 5 ml  LVEF MOD A4C: 60 5 %  LVESV MOD A4C: 45 6 ml  LVIDd: 4 2 cm  LVIDs: 2 9 cm  LVLd A4C: 8 cm  LVLs A4C: 6 7 cm  LVPWd: 1 1 cm  RA Area: 15 1 cm2  RVIDd: 2 9 cm  SV MOD A4C: 69 9 ml  SV(Teich): 47 5 ml    MM  TAPSE: 2 1 cm    PW  E': 0 m/s  E/E': 27 9  MV A Carlos A: 1 5 m/s  MV Dec Avery: 3 4 m/s2  MV DecT: 293 8 ms  MV E Carlos A: 1 m/s  MV E/A Ratio: 0 7  MV PHT: 85 2 ms  MVA By PHT: 2 6 cm2    [facility]    Prepared and electronically signed by    Allyson Alvarez MD  Signed [id number] 17:32:58       No orders to display     No results found for this or any previous visit           Physical Exam

## 2020-09-16 NOTE — PROGRESS NOTES
Assessment/Plan:  1 patient with severe aortoiliac disease however she is able to walk 100 yd if she walks slowly without pain will place or implantable 100 mg twice a day patient will be getting at home exercise bike and will refer to physical therapy to see if we can increase exercise tolerance so she can avoid surgery especially since she has chronic kidney disease and this could adversely impact her kidney function, will also refer to podiatry for surveillance to decrease the risk of lower extremity wounds she should have follow-up in 3 months  2  Type 2 diabetes A1c is near goal will need follow-up in 3 months A1c at that time patient will be decreasing her milk shakes if A1c remains above 7 in 3 months she should be started on medication for her diabetes  3  Hypertension which has been difficult to control for many years is near goal  4  Patient with statin intolerance severe vascular disease she is willing to take a P SK 9 inhibitor she will be contacting her cardiologist Dr Austin Campoverde for  5  Renovascular hypertension   Strongly recommend she stop smoking  6  Patient with history of breast mass will refer Dr Araseli Howard for further follow-up  Strongly recommend she stop smoking         Diagnoses and all orders for this visit:    Aortoiliac stenosis, left (HCC)  -     cilostazol (PLETAL) 100 mg tablet; Take 1 tablet (100 mg total) by mouth 2 (two) times a day  -     Ambulatory referral to Physical Therapy; Future  -     Ambulatory referral to Vascular Surgery; Future  -     Ambulatory referral to Podiatry; Future    Hyperlipidemia associated with type 2 diabetes mellitus ([facility] )    Vitamin D deficiency    Type 2 diabetes mellitus with diabetic nephropathy, without long-term current use of insulin ([facility] )  -     Ambulatory referral to Podiatry; Future    Stage 3 chronic kidney disease ([facility] )    Breast mass  -     Ambulatory referral to Surgical Oncology;  Future        The patient was counseled regarding instructions for management, risk factor reductions, patient and family education,impressions, risks and benefits of treatment options, side effects of medications, importance of compliance with treatment  The treatment plan was reviewed with the patient/guardian and patient/guardian understands and agrees with the treatment plan  Current Outpatient Medications:     amLODIPine (NORVASC) 2 5 mg tablet, Take 1 tablet (2 5 mg total) by mouth daily, Disp: 90 tablet, Rfl: 2    chlorthalidone 25 mg tablet, Take 1 tablet (25 mg total) by mouth daily, Disp: 90 tablet, Rfl: 2    cloNIDine (CATAPRES-TTS-3) 0 3 mg/24 hr, Place 1 patch (0 3 mg total) on the skin once a week, Disp: 12 patch, Rfl: 2    clopidogrel (PLAVIX) 75 mg tablet, TAKE 1 TABLET BY MOUTH EVERY DAY, Disp: 90 tablet, Rfl: 1    Icosapent Ethyl (VASCEPA) 1 g CAPS, 2 cap bid with meals, Disp: 360 capsule, Rfl: 2    labetalol (NORMODYNE) 300 mg tablet, Take 1 tablet (300 mg total) by mouth 2 (two) times a day, Disp: 270 tablet, Rfl: 2    losartan (COZAAR) 100 MG tablet, Take 1 tablet (100 mg total) by mouth every morning, Disp: 90 tablet, Rfl: 2    cilostazol (PLETAL) 100 mg tablet, Take 1 tablet (100 mg total) by mouth 2 (two) times a day, Disp: 60 tablet, Rfl: 5    Subjective:      Patient ID: Keyona Blakely is a 70 y o  female  HPI    The following portions of the patient's history were reviewed and updated as appropriate:   She has a past medical history of Arthritis, Chronic kidney disease, Coronary artery disease, Diabetes mellitus ([address] ), Endometriosis, High cholesterol, Hypertension, Kidney disease, chronic, stage III (moderate, EGFR 30-59 ml/min) ([address] ), Lumbar disc herniation, Neuropathy, Peripheral vascular disease ([address] ), Pneumonia, Shoulder injury, Spinal stenosis, and Stroke ([address] ) (2015)  ,  does not have any pertinent problems on file  ,   has a past surgical history that includes Rotator cuff repair (Left); Tonsillectomy;  Ovarian cyst surgery; Fracture surgery (Right); Colonoscopy; Cardiac catheterization; [place] retrograde pyelogram (4/6/2020); pr cystourethroscopy,fulgur 0 5-2 cm lesn (N/A, 4/6/2020); pr cystourethroscopy,ureter catheter (Bilateral, 4/6/2020); and pr cystoscopy,insert ureteral stent (Right, 4/6/2020)  ,  family history includes Arthritis in her brother; Heart defect in her father; Heart disease in her mother; Hyperlipidemia in her mother; Hypertension in her father and mother; Other in her brother; Stroke in her sister  ,   reports that she quit smoking about 7 weeks ago  Her smoking use included cigarettes  She smoked 0 00 packs per day  She has never used smokeless tobacco  She reports that she does not drink alcohol or use drugs  ,  is allergic to fenofibrate; colesevelam; colestipol; ezetimibe; pollen extract; and statins       Review of Systems   Constitutional: Negative for appetite change, chills, fatigue, fever and unexpected weight change  HENT: Negative for congestion, ear pain, facial swelling, hearing loss, mouth sores, nosebleeds, postnasal drip, rhinorrhea, sinus pain, sore throat, trouble swallowing and voice change  Eyes: Negative for pain, discharge, redness and visual disturbance  Respiratory: Negative for apnea, chest tightness, shortness of breath, wheezing and stridor  Cardiovascular: Negative for chest pain, palpitations and leg swelling  Gastrointestinal: Negative for abdominal distention, abdominal pain, blood in stool, constipation, diarrhea and vomiting  Endocrine: Negative for cold intolerance, heat intolerance, polydipsia, polyphagia and polyuria  Genitourinary: Negative for difficulty urinating, dysuria, flank pain, frequency, genital sores, hematuria and urgency  Musculoskeletal: Negative for arthralgias and back pain  Skin: Negative for rash and wound  Allergic/Immunologic: Negative for environmental allergies, food allergies and immunocompromised state     Neurological: Negative for dizziness, tremors, seizures, syncope, facial asymmetry, speech difficulty, weakness, light-headedness, numbness and headaches  Hematological: Negative for adenopathy  Does not bruise/bleed easily  Psychiatric/Behavioral: Negative for agitation, behavioral problems, dysphoric mood, hallucinations, self-injury, sleep disturbance and suicidal ideas  The patient is not hyperactive            Objective:  /60 (BP Location: Left arm, Patient Position: Sitting)   Pulse 75   Temp 97 6 °F (36 4 °C)   Resp 18   Ht 4' 11" (1 499 m)   Wt 78 5 kg (173 lb)   LMP  (LMP Unknown)   SpO2 98%   BMI 34 94 kg/m²     Lab Review  Orders Only on 09/08/2020   Component Date Value    White Blood Cell Count 09/08/2020 8 5     Red Blood Cell Count 09/08/2020 4 84     Hemoglobin 09/08/2020 13 9     HCT 09/08/2020 41 0     MCV 09/08/2020 85     MCH 09/08/2020 28 7     MCHC 09/08/2020 33 9     RDW 09/08/2020 13 8     Platelet Count 56/80/1201 227     Neutrophils 09/08/2020 67     Lymphocytes 09/08/2020 18     Monocytes 09/08/2020 9     Eosinophils 09/08/2020 4     Basophils PCT 09/08/2020 1     Neutrophils (Absolute) 09/08/2020 5 7     Lymphocytes (Absolute) 09/08/2020 1 5     Monocytes (Absolute) 09/08/2020 0 8     Eosinophils (Absolute) 09/08/2020 0 4     Basophils ABS 09/08/2020 0 1     Immature Granulocytes 09/08/2020 1     Immature Granulocytes (A* 09/08/2020 0 1     Glucose, Random 09/08/2020 179*    BUN 09/08/2020 42*    Creatinine 09/08/2020 2 02*    eGFR Non  09/08/2020 24*    eGFR  09/08/2020 28*    SL AMB BUN/CREATININE RA* 09/08/2020 21     Sodium 09/08/2020 138     Potassium 09/08/2020 4 0     Chloride 09/08/2020 97     CO2 09/08/2020 24     CALCIUM 09/08/2020 9 9     Protein, Total 09/08/2020 7 3     Albumin 09/08/2020 4 5     Globulin, Total 09/08/2020 2 8     Albumin/Globulin Ratio 09/08/2020 1 6     TOTAL BILIRUBIN 09/08/2020 0 5     Alk Phos Isoenzymes 09/08/2020 107     AST 09/08/2020 14     ALT 09/08/2020 13     Specific Gravity 09/08/2020 1 020     Ph 09/08/2020 6 5     Color UA 09/08/2020 Yellow     Urine Appearance 09/08/2020 Clear     Leukocyte Esterase 09/08/2020 1+*    Protein 09/08/2020 1+*    Glucose, 24 HR Urine 09/08/2020 Negative     Ketone, Urine 09/08/2020 Negative     Blood, Urine 09/08/2020 Negative     Bilirubin, Urine 09/08/2020 Negative     Urobilinogen Urine 09/08/2020 0 2     SL AMB NITRITES URINE, Q* 09/08/2020 Negative     Microscopic Examination 09/08/2020 See below:     Urinalysis Reflex 09/08/2020 Comment     SL AMB WBC, URINE 09/08/2020 11-30*    RBC, Urine 09/08/2020 0-2     Epithelial Cells (non re* 09/08/2020 0-10     Bacteria, Urine 09/08/2020 Few     Urine Culture Result 09/08/2020 Final report*    Result 1 09/08/2020 Citrobacter freundii*    SL AMB ANTIMICROBIAL JOS* 09/08/2020 Comment     Phosphorus, Serum 09/08/2020 3 9     Cholesterol, Total 09/08/2020 311*    Triglycerides 09/08/2020 344*    HDL 09/08/2020 36*    LDL Calculated 09/08/2020 205*    Comment 09/08/2020 Comment     LDL Direct 09/08/2020 211*    Creatinine, Urine 09/08/2020 119 2     Microalbum  ,U,Random 09/08/2020 274 8     Microalb/Creat Ratio 09/08/2020 231*    Hemoglobin A1C 09/08/2020 7 3*    TSH 09/08/2020 3 320     Magnesium, Serum 09/08/2020 2 3    Procedure visit on 08/14/2020   Component Date Value    LEUKOCYTE ESTERASE,UA 08/14/2020 NEG     NITRITE,UA 08/14/2020 NEG     SL AMB POCT UROBILINOGEN 08/14/2020 NEG     POCT URINE PROTEIN 08/14/2020 30      PH,UA 08/14/2020 5 0     BLOOD,UA 08/14/2020 NEG     SPECIFIC GRAVITY,UA 08/14/2020 1 025     KETONES,UA 08/14/2020 NEG     BILIRUBIN,UA 08/14/2020 NEG     GLUCOSE, UA 08/14/2020 NEG      COLOR,UA 08/14/2020 Clear     CLARITY,UA 08/14/2020 Yellow    Billing Encounter on 07/15/2020   Component Date Value    Color, UA 07/15/2020 Omid Yellow     Clarity, UA 07/15/2020 Cloudy     Specific Gravity, UA 07/15/2020 1 024     pH, UA 07/15/2020 5 5     Leukocytes, UA 07/15/2020 Large*    Nitrite, UA 07/15/2020 Negative     Protein, UA 07/15/2020 100 (2+)*    Glucose, UA 07/15/2020 Negative     Ketones, UA 07/15/2020 Negative     Urobilinogen, UA 07/15/2020 0 2     Bilirubin, UA 07/15/2020 Interference- unable to analyze*    Blood, UA 07/15/2020 Negative     RBC, UA 07/15/2020 None Seen     WBC, UA 07/15/2020 30-50*    Epithelial Cells 07/15/2020 Occasional     Bacteria, UA 07/15/2020 Occasional     AMORPH URATES 07/15/2020 Occasional     Urine Culture 07/15/2020 70,000-79,000 cfu/ml Citrobacter freundii*   Office Visit on 07/15/2020   Component Date Value    LEUKOCYTE ESTERASE,UA 07/15/2020 2+     NITRITE,UA 07/15/2020 nit+     SL AMB POCT UROBILINOGEN 07/15/2020 0 2     POCT URINE PROTEIN 07/15/2020 neg      PH,UA 07/15/2020 5 0     BLOOD,UA 07/15/2020 neg     SPECIFIC GRAVITY,UA 07/15/2020 1 030     KETONES,UA 07/15/2020 neg     BILIRUBIN,UA 07/15/2020 neg     GLUCOSE, UA 07/15/2020 neg      COLOR,UA 07/15/2020 dark yellow     CLARITY,UA 07/15/2020 cloudy     Hemoglobin A1C 07/15/2020 6 8*   Orders Only on 06/17/2020   Component Date Value    White Blood Cell Count 06/17/2020 7 0     Red Blood Cell Count 06/17/2020 4 60     Hemoglobin 06/17/2020 13 8     HCT 06/17/2020 39 6     MCV 06/17/2020 86     MCH 06/17/2020 30 0     MCHC 06/17/2020 34 8     RDW 06/17/2020 13 2     Platelet Count 38/86/1902 224     Neutrophils 06/17/2020 59     Lymphocytes 06/17/2020 26     Monocytes 06/17/2020 9     Eosinophils 06/17/2020 4     Basophils PCT 06/17/2020 1     Neutrophils (Absolute) 06/17/2020 4 1     Lymphocytes (Absolute) 06/17/2020 1 8     Monocytes (Absolute) 06/17/2020 0 6     Eosinophils (Absolute) 06/17/2020 0 3     Basophils ABS 06/17/2020 0 1     Immature Granulocytes 06/17/2020 1     Immature Granulocytes (A* 06/17/2020 0 1  Glucose, Random 06/17/2020 145*    BUN 06/17/2020 28     Creatinine 06/17/2020 1 61     eGFR Non  06/17/2020 CANCELED     eGFR  06/17/2020 CANCELED     SL AMB BUN/CREATININE RA* 06/17/2020 17     Sodium 06/17/2020 140     Potassium 06/17/2020 4 3     Chloride 06/17/2020 101     CO2 06/17/2020 21     CALCIUM 06/17/2020 9 6     Protein, Total 06/17/2020 7 0     Albumin 06/17/2020 4 4     Globulin, Total 06/17/2020 2 6     Albumin/Globulin Ratio 06/17/2020 1 7     TOTAL BILIRUBIN 06/17/2020 0 3     Alk Phos Isoenzymes 06/17/2020 87     AST 06/17/2020 17     ALT 06/17/2020 17     Cholesterol, Total 06/17/2020 299     Triglycerides 06/17/2020 281     HDL 06/17/2020 42     VLDL Cholesterol Calcula* 06/17/2020 56*    LDL Calculated 06/17/2020 201     Comment 06/17/2020 Comment     Creatinine, Urine 06/17/2020 97 1     Microalbum  ,U,Random 06/17/2020 473 4     Microalb/Creat Ratio 06/17/2020 488*    LDL Direct 06/17/2020 198     Uric Acid 06/17/2020 8 1     Magnesium, Serum 06/17/2020 2 0    Orders Only on 06/16/2020   Component Date Value    PTH, Intact 06/16/2020 86*   Orders Only on 06/16/2020   Component Date Value    White Blood Cell Count 06/16/2020 7 2     Red Blood Cell Count 06/16/2020 4 77     Hemoglobin 06/16/2020 13 8     HCT 06/16/2020 41 5     MCV 06/16/2020 87     MCH 06/16/2020 28 9     MCHC 06/16/2020 33 3     RDW 06/16/2020 13 0     Platelet Count 38/52/5862 247     Neutrophils 06/16/2020 59     Lymphocytes 06/16/2020 25     Monocytes 06/16/2020 10     Eosinophils 06/16/2020 4     Basophils PCT 06/16/2020 1     Neutrophils (Absolute) 06/16/2020 4 3     Lymphocytes (Absolute) 06/16/2020 1 8     Monocytes (Absolute) 06/16/2020 0 7     Eosinophils (Absolute) 06/16/2020 0 3     Basophils ABS 06/16/2020 0 1     Immature Granulocytes 06/16/2020 1     Immature Granulocytes (A* 06/16/2020 0 1     Specific [place] 06/16/2020 1 016     Ph 06/16/2020 6 0     Color UA 06/16/2020 Yellow     Urine Appearance 06/16/2020 Cloudy*    Leukocyte Esterase 06/16/2020 2+*    Protein 06/16/2020 2+*    Glucose, 24 HR Urine 06/16/2020 Negative     Ketone, Urine 06/16/2020 Negative     Blood, Urine 06/16/2020 Negative     Bilirubin, Urine 06/16/2020 Negative     Urobilinogen Urine 06/16/2020 0 2     SL AMB NITRITES URINE, Q* 06/16/2020 Positive*    Microscopic Examination 06/16/2020 See below:     SL AMB WBC, URINE 06/16/2020 11-30*    RBC, Urine 06/16/2020 0-2     Epithelial Cells (non re* 06/16/2020 0-10     Bacteria, Urine 06/16/2020 Few     Glucose, Random 06/16/2020 146*    BUN 06/16/2020 29*    Creatinine 06/16/2020 1 58*    eGFR Non  06/16/2020 32*    eGFR  06/16/2020 37*    SL AMB BUN/CREATININE RA* 06/16/2020 18     Sodium 06/16/2020 140     Potassium 06/16/2020 4 7     Chloride 06/16/2020 102     CO2 06/16/2020 22     CALCIUM 06/16/2020 9 8     Creatinine, Urine 06/16/2020 91 8     Microalbum  ,U,Random 06/16/2020 474 3     Microalb/Creat Ratio 06/16/2020 517*    Uric Acid 06/16/2020 8 1*    Phosphorus, Serum 06/16/2020 3 7    Admission on 04/06/2020, Discharged on 04/06/2020   Component Date Value    Case Report 04/06/2020                      Value:Surgical Pathology Report                         Case: [id number]                                   Authorizing Provider:  Thi Eric MD        Collected:           04/06/2020 0847              Ordering Location:     [facility]        Received:            04/06/2020 [address] [address] [place] [facility]                                                      Pathologist:           Genevieve Benton MD                                                        Specimen:    Urinary Bladder, bladder tumor right trigonal                                              Final Diagnosis 04/06/2020 Value:This result contains rich text formatting which cannot be displayed here   Additional Information 04/06/2020                      Value: This result contains rich text formatting which cannot be displayed here  [employer] Description 04/06/2020                      Value: This result contains rich text formatting which cannot be displayed here     Office Visit on 03/24/2020   Component Date Value    Ventricular Rate 03/24/2020 63     Atrial Rate 03/24/2020 63     AR Interval 03/24/2020 170     QRSD Interval 03/24/2020 80     QT Interval 03/24/2020 422     QTC Interval 03/24/2020 431     P Axis 03/24/2020 53     QRS Axis 03/24/2020 -10     T Wave Axis 03/24/2020 108    Appointment on 03/24/2020   Component Date Value    ABO Grouping 03/24/2020 A     Rh Factor 03/24/2020 Negative     Antibody Screen 03/24/2020 Negative     Specimen Expiration Date 03/24/2020 [id number]     PTT 03/24/2020 34     Protime 03/24/2020 12 7     INR 03/24/2020 0 96     WBC 03/24/2020 7 11     RBC 03/24/2020 4 94     Hemoglobin 03/24/2020 14 1     Hematocrit 03/24/2020 44 7     MCV 03/24/2020 91     MCH 03/24/2020 28 5     MCHC 03/24/2020 31 5     RDW 03/24/2020 13 2     MPV 03/24/2020 10 4     Platelets 06/06/0138 210     nRBC 03/24/2020 0     Neutrophils Relative 03/24/2020 64     Immat GRANS % 03/24/2020 1     Lymphocytes Relative 03/24/2020 21     Monocytes Relative 03/24/2020 8     Eosinophils Relative 03/24/2020 5     Basophils Relative 03/24/2020 1     Neutrophils Absolute 03/24/2020 4 58     Immature Grans Absolute 03/24/2020 0 07     Lymphocytes Absolute 03/24/2020 1 48     Monocytes Absolute 03/24/2020 0 58     Eosinophils Absolute 03/24/2020 0 34     Basophils Absolute 03/24/2020 0 06     Sodium 03/24/2020 143     Potassium 03/24/2020 4 6     Chloride 03/24/2020 106     CO2 03/24/2020 27     ANION GAP 03/24/2020 10     BUN 03/24/2020 30*    Creatinine 03/24/2020 1 71*    Glucose, Fasting 2020 148*    Calcium 2020 9 7     eGFR 2020 29     Urine Culture 2020 50,000-59,000 cfu/ml Escherichia coli*   There may be more visits with results that are not included  Imaging  [email or web address]  Recent Results (from the past 04175 hour(s))   ECG 12 lead   Result Value    Ventricular Rate 63    Atrial Rate 63    CO Interval 170    QRSD Interval 80    QT Interval 422    QTC Interval 431    P Axis 53    QRS Axis -10    T Wave Axis 108    Narrative    Normal sinus rhythm  Left ventricular hypertrophy with repolarization abnormality  Anteroseptal infarct (cited on or before 2019)  Cannot rule out Inferior infarct  When compared with ECG of 2019 10:49,  No significant change was found  Confirmed by Isabel Gunter ([id number]) on 3/24/2020 1:04:57 PM   NM myocardial perfusion spect (rx stress and/or rest)    Narrative    [address] [place] [address] 89 [phone]    Rest/Stress Gated SPECT Myocardial Perfusion Imaging After Regadenoson    Patient: Jamel Blood  MR number: [id number]  Account number: [de-identified]  : 1945  Age: 69 years  Gender: Female  Status: Outpatient  Location: Stress lab  Height: 58 in  Weight: 163 lb  BP: 206/ 84 mmHg    Allergies: ATORVASTATIN, COLESEVELAM, COLESTIPOL, EZETIMIBE, FENOFIBRATE, POLLEN EXTRACT, ROSUVASTATIN, STATINS    Diagnosis: R94 31 - Abnormal electrocardiogram [ECG] [EKG]    Primary Physician:  Miguel Bryan DO  Referring Physician:  Miguel Bryan DO  Group:  [facility]  Other:  Evan Sam MS, CCT  Interpreting Physician:  Vanita Garnett MD    INDICATIONS: Detection of coronary artery disease  Pre-operative risk assessment  HISTORY: The patient is a 69year old  female  Chest pain status: no chest pain   Coronary artery disease risk factors: dyslipidemia, hypertension, smoking, diabetes mellitus, and post-menopausal state  Cardiovascular history:  peripheral vascular occlusive disease, stroke, and transient ischemic attack  Co-morbidity: history of renal disease and obesity  GERD, renal and celiac stenosis  Medications: a beta blocker, a calcium channel blocker, an ACE  inhibitor/ARB, clopidogrel, and an antihypertensive agent  Previous test results: abnormal ECG and hyperlipidemia  PHYSICAL EXAM: Baseline physical exam screening: normal     REST ECG: Normal sinus rhythm  Nonspecific ST and T wave abnormalities were present  PROCEDURE: The study was performed in the the Stress lab  A regadenoson infusion pharmacologic stress test was performed  Gated SPECT myocardial perfusion imaging was performed after stress and at rest  Systolic blood pressure was 206  mmHg, at the start of the study  Diastolic blood pressure was 84 mmHg, at the start of the study  The heart rate was 63 bpm, at the start of the study  Regadenoson protocol:  HR bpm SBP mmHg DBP mmHg Symptoms Rhythm/conduct  Baseline 63 206 84 none NSR, no ectopy  Immediate 83 126 84 mild dyspnea, nausea NSR, no ectopy  1 min 76 -- -- none NSR, no ectopy  2 min 71 184 78 none NSR, no ectopy  Rest SpO2 98, Peak Stress SpO2 99    STRESS SUMMARY: Duration of pharmacologic stress was 3 min  Maximal heart rate during stress was 83 bpm  The rate-pressure product for the peak heart rate and blood pressure was 19300  There was no chest pain during stress  The stress test  was terminated due to protocol completion  The stress ECG was negative for ischemia and normal     ISOTOPE ADMINISTRATION:  Resting isotope administration Stress isotope administration  Agent Tetrofosmin Tetrofosmin  Dose 9 9 mCi 29 6 mCi  Date 11/08/2019 11/08/2019  Injection-image interval 30 min 30 min    The radiopharmaceutical was injected at the peak effect of pharmacologic stress  MYOCARDIAL PERFUSION IMAGING:  The image quality was good      PERFUSION DEFECTS:  -  There was a small, mildly severe, partially reversible myocardial perfusion defect of anterior and inferior wall  -  There was of the inferior and anterior wall  GATED SPECT:  The calculated left ventricular ejection fraction was 61 %  There was no left ventricular regional abnormality  SUMMARY:  -  Stress results: There was no chest pain during stress  -  ECG conclusions: The stress ECG was negative for ischemia and normal   -  Perfusion imaging: There was a small, mildly severe, partially reversible myocardial perfusion defect of anterior and inferior wall There was of the inferior and anterior wall  -  Gated SPECT: The calculated left ventricular ejection fraction was 61 %  There was no left ventricular regional abnormality  Prepared and signed by    Linda Fallon MD  Signed 2019 17:53:08     Echo complete with contrast if indicated    Narrative    [address]  [address] [address]  [phone]    Transthoracic Echocardiogram  2D, M-mode, Doppler, and Color Doppler    Study date:  2019    Patient: Parag Jain  MR number: [id number]  Account number: [de-identified]  : 15-Juma-1945  Age: 69 years  Gender: Female  Status: Outpatient  Location: Echo lab  Height: 58 in  Weight: 163 lb  BP: 216/ 86 mmHg    Indications: Abnormal EKG, Assess left ventricular function  Diagnoses: R94 31 - Abnormal electrocardiogram [ECG] [EKG]    Sonographer:  Avani Morrison  Referring Physician:  Brett Lyons DO  Group:  [facility]  Interpreting Physician:  Linda Fallon MD    SUMMARY    LEFT VENTRICLE:  Systolic function was normal  Ejection fraction was estimated to be 60 %  There were no regional wall motion abnormalities  Wall thickness was mildly increased  Doppler parameters were consistent with abnormal left ventricular relaxation (grade 1 diastolic dysfunction)    Doppler parameters were consistent with elevated ventricular end-diastolic filling pressure  LEFT ATRIUM:  The atrium was mildly dilated  MITRAL VALVE:  There was mild annular calcification  There was mild regurgitation  TRICUSPID VALVE:  There was trace regurgitation  AORTA:  The root exhibited normal size and mild fibrocalcific change  HISTORY: Consistent with transient ischemic attack or stroke  PRIOR HISTORY: CKD, Risk factors: hypertension, diabetes, and hypercholesterolemia  PROCEDURE: The procedure was performed in the echo lab  This was a routine study  The transthoracic approach was used  The study included complete 2D imaging, M-mode, complete spectral Doppler, and color Doppler  The heart rate was 60 bpm,  at the start of the study  Images were obtained from the parasternal, apical, subcostal, and suprasternal notch acoustic windows  Image quality was adequate  LEFT VENTRICLE: Size was normal  Systolic function was normal  Ejection fraction was estimated to be 60 %  There were no regional wall motion abnormalities  Wall thickness was mildly increased  No evidence of apical thrombus  DOPPLER:  Doppler parameters were consistent with abnormal left ventricular relaxation (grade 1 diastolic dysfunction)  Doppler parameters were consistent with elevated ventricular end-diastolic filling pressure  RIGHT VENTRICLE: The size was normal  Systolic function was normal  Wall thickness was normal     LEFT ATRIUM: The atrium was mildly dilated  RIGHT ATRIUM: Size was normal     MITRAL VALVE: There was mild annular calcification  Valve structure was normal  There was mild-moderate calcification  There was mildly reduced leaflet separation  DOPPLER: The transmitral velocity was within the normal range  There was no  evidence for stenosis  There was mild regurgitation  AORTIC VALVE: The valve was trileaflet  Leaflets exhibited normal thickness and normal cuspal separation  DOPPLER: Transaortic velocity was within the normal range   There was no evidence for stenosis  There was no significant  regurgitation  TRICUSPID VALVE: The valve structure was normal  There was normal leaflet separation  DOPPLER: The transtricuspid velocity was within the normal range  There was no evidence for stenosis  There was trace regurgitation  PULMONIC VALVE: Leaflets exhibited normal thickness, no calcification, and normal cuspal separation  DOPPLER: The transpulmonic velocity was within the normal range  There was no significant regurgitation  PERICARDIUM: There was no pericardial effusion  The pericardium was normal in appearance  AORTA: The root exhibited normal size and mild fibrocalcific change  SYSTEMIC VEINS: IVC: The inferior vena cava was normal in size  SYSTEM MEASUREMENT TABLES    2D  %FS: 30 9 %  Ao Diam: 2 7 cm  EDV(Teich): 80 7 ml  EF(Teich): 58 9 %  ESV(Teich): 33 1 ml  IVSd: 1 1 cm  LA Area: 17 cm2  LA Diam: 3 9 cm  LVEDV MOD A4C: 115 5 ml  LVEF MOD A4C: 60 5 %  LVESV MOD A4C: 45 6 ml  LVIDd: 4 2 cm  LVIDs: 2 9 cm  LVLd A4C: 8 cm  LVLs A4C: 6 7 cm  LVPWd: 1 1 cm  RA Area: 15 1 cm2  RVIDd: 2 9 cm  SV MOD A4C: 69 9 ml  SV(Teich): 47 5 ml    MM  TAPSE: 2 1 cm    PW  E': 0 m/s  E/E': 27 9  MV A Carlos A: 1 5 m/s  MV Dec De Baca: 3 4 m/s2  MV DecT: 293 8 ms  MV E Carlos A: 1 m/s  MV E/A Ratio: 0 7  MV PHT: 85 2 ms  MVA By PHT: 2 6 cm2    [facility] Accredited Echocardiography Laboratory    Prepared and electronically signed by    Cesario Alexandre MD  Signed [id number] 17:32:58       No orders to display     No results found for this or any previous visit           Physical Exam

## 2020-09-18 ENCOUNTER — TELEPHONE (OUTPATIENT)
Dept: INTERNAL MEDICINE CLINIC | Facility: CLINIC | Age: 75
End: 2020-09-18

## 2020-09-18 NOTE — TELEPHONE ENCOUNTER
Spoke with pt    She stated 50 years ago she had a breast lumps but they have been gone she refuses mamograms , breast exam and to see Dr Victorino Dixon           ----- Message from Roberto Castro DO sent at 9/16/2020  9:47 AM EDT -----  Patient has a problem S of breast mass please track down how that has been handled

## 2020-09-21 ENCOUNTER — TELEPHONE (OUTPATIENT)
Dept: INTERNAL MEDICINE CLINIC | Facility: CLINIC | Age: 75
End: 2020-09-21

## 2020-09-21 DIAGNOSIS — N39.0 URINARY TRACT INFECTION WITHOUT HEMATURIA, SITE UNSPECIFIED: Primary | ICD-10-CM

## 2020-09-21 RX ORDER — CIPROFLOXACIN 250 MG/1
250 TABLET, FILM COATED ORAL EVERY 12 HOURS SCHEDULED
Qty: 6 TABLET | Refills: 0 | Status: SHIPPED | OUTPATIENT
Start: 2020-09-21 | End: 2020-09-24

## 2020-09-21 NOTE — TELEPHONE ENCOUNTER
----- Message from [facility],  Lani  sent at 9/21/2020 12:54 PM EDT -----  Urinalysis shows (+) UTI   I sent rx for Cipro BID x 3 days to her pharmacy thanks

## 2020-09-28 ENCOUNTER — OFFICE VISIT (OUTPATIENT)
Dept: INTERNAL MEDICINE CLINIC | Facility: CLINIC | Age: 75
End: 2020-09-28
Payer: COMMERCIAL

## 2020-09-28 VITALS
HEIGHT: 59 IN | RESPIRATION RATE: 16 BRPM | BODY MASS INDEX: 35.08 KG/M2 | DIASTOLIC BLOOD PRESSURE: 80 MMHG | OXYGEN SATURATION: 97 % | HEART RATE: 78 BPM | SYSTOLIC BLOOD PRESSURE: 138 MMHG | TEMPERATURE: 96.5 F | WEIGHT: 174 LBS

## 2020-09-28 DIAGNOSIS — L89.311 PRESSURE INJURY OF RIGHT BUTTOCK, STAGE 1: Primary | ICD-10-CM

## 2020-09-28 PROBLEM — L89.301 PRESSURE INJURY OF BUTTOCK, STAGE 1: Status: ACTIVE | Noted: 2020-09-28

## 2020-09-28 PROCEDURE — 3075F SYST BP GE 130 - 139MM HG: CPT | Performed by: NURSE PRACTITIONER

## 2020-09-28 PROCEDURE — 3079F DIAST BP 80-89 MM HG: CPT | Performed by: NURSE PRACTITIONER

## 2020-09-28 PROCEDURE — 1160F RVW MEDS BY RX/DR IN RCRD: CPT | Performed by: NURSE PRACTITIONER

## 2020-09-28 PROCEDURE — 1036F TOBACCO NON-USER: CPT | Performed by: NURSE PRACTITIONER

## 2020-09-28 PROCEDURE — 99213 OFFICE O/P EST LOW 20 MIN: CPT | Performed by: NURSE PRACTITIONER

## 2020-09-28 NOTE — PROGRESS NOTES
Assessment/Plan:    Pressure ulcer- Continue peroxide and Vaseline to area and cover with pad as you have been doing  Please make sure you are up and about during the day  Try sleeping on the sofa or in bed if tolerated and reposition yourself as I demonstrated from left to right side every hour  Follow up as scheduled  Diagnoses and all orders for this visit:    Pressure injury of right buttock, stage 1    The patient was counseled regarding instructions for management, risk factor reductions, patient and family education,impressions, risks and benefits of treatment options, side effects of medications, importance of compliance with treatment  The treatment plan was reviewed with the patient/guardian and patient/guardian understands and agrees with the treatment plan  Current Outpatient Medications:     amLODIPine (NORVASC) 2 5 mg tablet, Take 1 tablet (2 5 mg total) by mouth daily, Disp: 90 tablet, Rfl: 2    chlorthalidone 25 mg tablet, Take 1 tablet (25 mg total) by mouth daily, Disp: 90 tablet, Rfl: 2    cilostazol (PLETAL) 100 mg tablet, Take 1 tablet (100 mg total) by mouth 2 (two) times a day, Disp: 60 tablet, Rfl: 5    cloNIDine (CATAPRES-TTS-3) 0 3 mg/24 hr, Place 1 patch (0 3 mg total) on the skin once a week, Disp: 12 patch, Rfl: 2    clopidogrel (PLAVIX) 75 mg tablet, TAKE 1 TABLET BY MOUTH EVERY DAY, Disp: 90 tablet, Rfl: 1    Icosapent Ethyl (VASCEPA) 1 g CAPS, 2 cap bid with meals, Disp: 360 capsule, Rfl: 2    labetalol (NORMODYNE) 300 mg tablet, Take 1 tablet (300 mg total) by mouth 2 (two) times a day, Disp: 270 tablet, Rfl: 2    losartan (COZAAR) 100 MG tablet, Take 1 tablet (100 mg total) by mouth every morning, Disp: 90 tablet, Rfl: 2    Subjective:      Patient ID: Rogers Multani is a 70 y o  female  Has pressure ulcer on buttocks  Has been sleeping in a chair for the past three years and spends time in the same chair during the day   She has been applying peroxide and vaseline with a cushioned pad on top  Has been sleeping on a donut without relief  The following portions of the patient's history were reviewed and updated as appropriate:   She has a past medical history of Arthritis, Chronic kidney disease, Coronary artery disease, Diabetes mellitus ([facility] [address] ), Endometriosis, High cholesterol, Hypertension, Kidney disease, chronic, stage III (moderate, EGFR 30-59 ml/min) (HCC), Lumbar disc herniation, Neuropathy, Peripheral vascular disease ([facility] [address] ), Pneumonia, Shoulder injury, Spinal stenosis, and Stroke ([facility] [address] ) (2015)  ,  does not have any pertinent problems on file  ,   has a past surgical history that includes Rotator cuff repair (Left); Tonsillectomy; Ovarian cyst surgery; Fracture surgery (Right); Colonoscopy; Cardiac catheterization; [place] retrograde pyelogram (4/6/2020); pr cystourethroscopy,fulgur 0 5-2 cm lesn (N/A, 4/6/2020); pr cystourethroscopy,ureter catheter (Bilateral, 4/6/2020); and pr cystoscopy,insert ureteral stent (Right, 4/6/2020)  ,  family history includes Arthritis in her brother; Heart defect in her father; Heart disease in her mother; Hyperlipidemia in her mother; Hypertension in her father and mother; Other in her brother; Stroke in her sister  ,   reports that she quit smoking about 2 months ago  Her smoking use included cigarettes  She smoked 0 00 packs per day  She has never used smokeless tobacco  She reports that she does not drink alcohol or use drugs  ,  is allergic to fenofibrate; colesevelam; colestipol; ezetimibe; pollen extract; and statins       Review of Systems   Constitutional: Negative  Respiratory: Negative  Cardiovascular: Negative  Musculoskeletal: Positive for gait problem  Skin: Positive for wound  Psychiatric/Behavioral: Negative            Objective:  /80 (BP Location: Left arm)   Pulse 78   Temp (!) 96 5 °F (35 8 °C)   Resp 16   Ht 4' 11" (1 499 m)   Wt 78 9 kg (174 lb)   LMP  (LMP Unknown)   SpO2 97%   BMI 35 14 kg/m²     Lab Review  Orders Only on 09/08/2020   Component Date Value    White Blood Cell Count 09/08/2020 8 5     Red Blood Cell Count 09/08/2020 4 84     Hemoglobin 09/08/2020 13 9     HCT 09/08/2020 41 0     MCV 09/08/2020 85     MCH 09/08/2020 28 7     MCHC 09/08/2020 33 9     RDW 09/08/2020 13 8     Platelet Count 99/80/4887 227     Neutrophils 09/08/2020 67     Lymphocytes 09/08/2020 18     Monocytes 09/08/2020 9     Eosinophils 09/08/2020 4     Basophils PCT 09/08/2020 1     Neutrophils (Absolute) 09/08/2020 5 7     Lymphocytes (Absolute) 09/08/2020 1 5     Monocytes (Absolute) 09/08/2020 0 8     Eosinophils (Absolute) 09/08/2020 0 4     Basophils ABS 09/08/2020 0 1     Immature Granulocytes 09/08/2020 1     Immature Granulocytes (A* 09/08/2020 0 1     Glucose, Random 09/08/2020 179*    BUN 09/08/2020 42*    Creatinine 09/08/2020 2 02*    eGFR Non  09/08/2020 24*    eGFR  09/08/2020 28*    SL AMB BUN/CREATININE RA* 09/08/2020 21     Sodium 09/08/2020 138     Potassium 09/08/2020 4 0     Chloride 09/08/2020 97     CO2 09/08/2020 24     CALCIUM 09/08/2020 9 9     Protein, Total 09/08/2020 7 3     Albumin 09/08/2020 4 5     Globulin, Total 09/08/2020 2 8     Albumin/Globulin Ratio 09/08/2020 1 6     TOTAL BILIRUBIN 09/08/2020 0 5     Alk Phos Isoenzymes 09/08/2020 107     AST 09/08/2020 14     ALT 09/08/2020 13     Specific Gravity 09/08/2020 1 020     Ph 09/08/2020 6 5     Color UA 09/08/2020 Yellow     Urine Appearance 09/08/2020 Clear     Leukocyte Esterase 09/08/2020 1+*    Protein 09/08/2020 1+*    Glucose, 24 HR Urine 09/08/2020 Negative     Ketone, Urine 09/08/2020 Negative     Blood, Urine 09/08/2020 Negative     Bilirubin, Urine 09/08/2020 Negative     Urobilinogen Urine 09/08/2020 0 2     SL AMB NITRITES URINE, Q* 09/08/2020 Negative     Microscopic Examination 09/08/2020 See below:     Urinalysis Reflex 09/08/2020 Comment     SL AMB WBC, URINE 09/08/2020 11-30*    RBC, Urine 09/08/2020 0-2     Epithelial Cells (non re* 09/08/2020 0-10     Bacteria, Urine 09/08/2020 Few     Urine Culture Result 09/08/2020 Final report*    Result 1 09/08/2020 Citrobacter freundii*    SL AMB ANTIMICROBIAL JOS* 09/08/2020 Comment     Phosphorus, Serum 09/08/2020 3 9     Cholesterol, Total 09/08/2020 311*    Triglycerides 09/08/2020 344*    HDL 09/08/2020 36*    LDL Calculated 09/08/2020 205*    Comment 09/08/2020 Comment     LDL Direct 09/08/2020 211*    Creatinine, Urine 09/08/2020 119 2     Microalbum  ,U,Random 09/08/2020 274 8     Microalb/Creat Ratio 09/08/2020 231*    Hemoglobin A1C 09/08/2020 7 3*    TSH 09/08/2020 3 320     Magnesium, Serum 09/08/2020 2 3    Procedure visit on 08/14/2020   Component Date Value    LEUKOCYTE ESTERASE,UA 08/14/2020 NEG     NITRITE,UA 08/14/2020 NEG     SL AMB POCT UROBILINOGEN 08/14/2020 NEG     POCT URINE PROTEIN 08/14/2020 30      PH,UA 08/14/2020 5 0     BLOOD,UA 08/14/2020 NEG     SPECIFIC GRAVITY,UA 08/14/2020 1 025     KETONES,UA 08/14/2020 NEG     BILIRUBIN,UA 08/14/2020 NEG     GLUCOSE, UA 08/14/2020 NEG      COLOR,UA 08/14/2020 Clear     CLARITY,UA 08/14/2020 Yellow         Imaging: Vas Carotid Complete Study    Result Date: 9/3/2020  Narrative:  [facility]PORT [facility]IN[facility]: Indications:  Yearly surveillance of carotid artery disease  Patient is asymptomatic at this time  Operative History: 2 colon polyps adhesions left rotator cuff ovarian cyst pinning right ankle Risk Factors The patient has history of Obesity, HTN, HLD, CKD, PAD, quit smoking one month ago, basilar artery stenosis, celiac stenosis, mesenteric stenosis, right renal artery stenosis, and CAD  Clinical Right Pressure:  148/80 mm Hg, Left Pressure:  148/82 mm Hg    FINDINGS:  Right        Impression  PSV  EDV (cm/s)  Direction of Flow  Ratio  Dist  ICA                 29           9 0 34  Mid  ICA                  89          22                      1 06  Prox  ICA    50 - 69%    128          32                      1 52  Dist CCA                  79          14                            Mid CCA                   84          14                      1 19  Prox CCA                  71           9                            Ext Carotid              143          27                      1 70  Prox Vert                 19           0  Antegrade                 Subclavian               141           0                             Left         Impression  PSV  EDV (cm/s)  Direction of Flow  Ratio  Dist  ICA                 40           8                      0 44  Mid  ICA                  51           9                      0 57  Prox  ICA    50 - 69%    134          32                      1 48  Dist CCA                  69          13                            Mid CCA                   91           6                      0 85  Prox CCA                 106          12                            Ext Carotid              175          14                      1 93  Prox Vert                 54           9  Antegrade                 Subclavian               172           0                               CONCLUSION:  Impression RIGHT: There is 50-69% stenosis noted in the internal carotid artery  Plaque is heterogenous and irregular  Vertebral artery flow is antegrade, however is high resistive  There is no significant subclavian artery disease  LEFT: There is 50-69% stenosis noted in the internal carotid artery  Plaque is heterogenous and irregular  Vertebral artery flow is antegrade  There is no significant subclavian artery disease  Tech note: Technically limited exam due to patient's erratic breathing  Compared to previous study on 07/01/2019, there is no significant change    Internal carotid artery stenosis determination by consensus criteria from: Alberto Oliver , et al  Carotid Artery Stenosis: Gray-Scale and Doppler US Diagnosis - Society of Radiologists in [address], Radiology 2003; [phone]  SIGNATURE: Electronically Signed by: Adam Mcdonnell on 2020-09-03 11:42:57 AM    Vas Lower Limb Arterial Duplex, Complete Bilateral    Result Date: 9/8/2020  Narrative:  [facility] REPORT CLINICAL: Indications:  PVD, Unspecified [I73 9]  6 month surveillance for progression of disease  The patient is able to walk less than a  block before getting pain  Operative History: 2 colon polyps adhesions left rotator cuff ovarian cyst pinning right ankle Risk Factors: Obesity, HTN, HLD, CKD, PAD, CAD and previous smoking (quit <1yr ago)  Clinical Right Pressure:  177/ mm Hg, Left Pressure:  154/ mm Hg  FINDINGS:  Segment                Right                        Left                                          Impression  PSV (cm/s)  EDV  Impression  PSV (cm/s)  EDV  Common Femoral Artery                     208   30                     104    0  Prox Profunda          50-75%             334   26  50-75%             369   46  Prox SFA               Occluded           103   21  Occluded             0    0  Mid SFA                Occluded             0    0  Occluded             0    0  Dist SFA                                   24    0  Occluded             0    0  Proximal Pop                               55   19                      32   10  Distal Pop                                 53   13                      50   12  Tibioperoneal                              60   16                      50   12  Dist Post Tibial                           68   21                      16    0  Dist  Ant   Tibial      Occluded             0    0                      36   11  Dist Peroneal          Occluded             0                           44   12     CONCLUSION:  Impression: RIGHT LOWER LIMB: Diffuse atherosclerotic disease in the femoral and popliteal arteries with occlusion of the proximal and mid superficial femoral artery  There is a 50-75% stenosis of the profunda femoris artery  There is an occlusion of the distal anterior tibial and peroneal arteries  Ankle/Brachial index: 0 5 which is in the ischemic disease category (Prior 0 66 ) PVR/ PPG tracings are dampened  Metatarsal pressure of 96mmHg Great toe pressure of 69mmHg, within the healing range, Prior 87mmHg  There is a well defined hypoechoic non-vascularized cystic-type structure noted in the popliteal fossa  LEFT LOWER LIMB: Diffuse atherosclerotic disease in the femoral and popliteal arteries with an occlusion of the proximal to distal superficial femoral artery  There is evidence of tibial disease  Ankle/Brachial index: 0 63 which is in the severe disease category (Prior 0 64 ) PVR/ PPG tracings are dampened  Metatarsal pressure of 68mmHg Great toe pressure of 60mmHg, within the healing range, Prior 77mmHg  There is a well defined hypoechoic non-vascularized cystic-type structure noted in the popliteal fossa  Compared to previous study on 01/07/2020, there is progression of disease bilaterally  SIGNATURE: Electronically Signed by: India Lawler on 2020-09-08 11:24:34 AM    Vas Renal Artery Complete    Result Date: 9/3/2020  Narrative:  [facility] REPORT CLINICAL: Indications:  6month surveillance of renal arteries to evaluate for progression of disease  Patient has no complaints at this time  Operative History: 2 colon polyps adhesions left rotator cuff ovarian cyst pinning right ankle Risk Factors: Obesity, HTN, HLD, CKD, PAD, quit smoking one month ago, basilar artery stenosis, celiac stenosis, mesenteric stenosis, right renal artery stenosis, and CAD  FINDINGS:  Unilateral       Impression  PSV (cm/s)  EDV (cm/s)    RI  Sup-Liz Ao                          142          20  0 86  Sup Renal Aorta                      67                    Celiac           >70%               245          51        Prox   SMA 196          22         Right          Impression  PSV (cm/s)  EDV (cm/s)   RAR    RI  Kidney (cm)  Ostial Renal                      486         128                           Prox Renal     60 - 99%           745         155  5 26  0 82               Mid Renal                          65          17  0 46  0 75               Dist Renal                        118          30  0 83  0 75               Celiac Artery                      21           8        0 60               Kidney         Normal                                                10 86  Hilum 3                            30           9        0 70               Renal          Patent                                                       Hilum                              14           6        0 56                Left           Impression  PSV (cm/s)  EDV (cm/s)   RAR    RI  Kidney (cm)  Prox Renal     Normal             173          59  1 22  0 66               Mid Renal                         108          30  0 76  0 72               Dist Renal                         65          18  0 46  0 72               Celiac Artery                      12           8        0 40               Kidney         Atrophic                                               6 28  Renal          Patent                                                       Hilum                              12           8        0 36                  CONCLUSION:  Impression The abdominal aorta appears to be patent and of normal caliber  RIGHT RENAL: There is a >60% stenosis in the main renal artery  Patent renal vein Renovascular resistive index of 0 6  Renal/Aorta Ratio: 5 26   The Kidney measures 10 86cm, Prior 11 4cm LEFT RENAL: No evidence of significant arterial occlusive disease in the main renal artery  Patent renal vein Renovascular resistive index of 0 4  Renal/Aorta Ratio: 1 22    The Kidney measures 6 28cm, Prior 7 2cm MESENTERIC: There is 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  Superior mesenteric artery is patent  Tech note: This exam was technically limited due to patient's body habitus, overlying bowel gas, and patient's inability to remain in a right lateral decubitus position for extending period of time  Compared to previous study on 01/07/2020, there is no significant change noted  SIGNATURE: Electronically Signed by: Jacque Song on 2020-09-03 11:41:45 AM         Physical Exam  Constitutional:       Appearance: She is well-developed  Cardiovascular:      Rate and Rhythm: Normal rate and regular rhythm  Heart sounds: Normal heart sounds  Pulmonary:      Effort: Pulmonary effort is normal       Breath sounds: Normal breath sounds  Musculoskeletal: Normal range of motion  Skin:     Comments: There are two stage 1 pressure sores < 1 cm in diameter on the right buttock  Neurological:      Mental Status: She is alert and oriented to person, place, and time  Deep Tendon Reflexes: Reflexes are normal and symmetric  Psychiatric:         Behavior: Behavior normal          Thought Content:  Thought content normal          Judgment: Judgment normal

## 2020-09-28 NOTE — PATIENT INSTRUCTIONS
Pressure ulcer- Continue peroxide and Vaseline to area and cover with pad as you have been doing  Please make sure you are up and about during the day  Try sleeping on the sofa or in bed if tolerated and reposition yourself as I demonstrated from left to right side every hour  Follow up as scheduled

## 2020-09-30 ENCOUNTER — TELEPHONE (OUTPATIENT)
Dept: CARDIOLOGY CLINIC | Facility: CLINIC | Age: 75
End: 2020-09-30

## 2020-09-30 DIAGNOSIS — E78.2 HYPERLIPEMIA, MIXED: Primary | ICD-10-CM

## 2020-09-30 RX ORDER — EVOLOCUMAB 140 MG/ML
140 INJECTION, SOLUTION SUBCUTANEOUS
Qty: 6 PEN | Refills: 3 | Status: CANCELLED | OUTPATIENT
Start: 2020-09-30

## 2020-09-30 RX ORDER — EVOLOCUMAB 140 MG/ML
INJECTION, SOLUTION SUBCUTANEOUS
Qty: 6 PEN | Refills: 1 | Status: SHIPPED | OUTPATIENT
Start: 2020-09-30 | End: 2020-11-10

## 2020-10-02 ENCOUNTER — TELEPHONE (OUTPATIENT)
Dept: VASCULAR SURGERY | Facility: CLINIC | Age: 75
End: 2020-10-02

## 2020-10-08 DIAGNOSIS — E11.59 HYPERTENSION ASSOCIATED WITH DIABETES (HCC): ICD-10-CM

## 2020-10-08 DIAGNOSIS — I15.2 HYPERTENSION ASSOCIATED WITH DIABETES (HCC): ICD-10-CM

## 2020-10-08 RX ORDER — AMLODIPINE BESYLATE 2.5 MG/1
2.5 TABLET ORAL DAILY
Qty: 90 TABLET | Refills: 2 | Status: SHIPPED | OUTPATIENT
Start: 2020-10-08 | End: 2020-11-10

## 2020-10-22 LAB
25(OH)D3+25(OH)D2 SERPL-MCNC: 22.4 NG/ML (ref 30–100)
ALBUMIN/CREAT UR: 97 MG/G CREAT (ref 0–29)
APPEARANCE UR: CLEAR
BACTERIA URNS QL MICRO: ABNORMAL
BASOPHILS # BLD AUTO: 0.1 X10E3/UL (ref 0–0.2)
BASOPHILS NFR BLD AUTO: 1 %
BILIRUB UR QL STRIP: NEGATIVE
BUN SERPL-MCNC: 45 MG/DL (ref 8–27)
BUN/CREAT SERPL: 20 (ref 12–28)
CALCIUM SERPL-MCNC: 10.3 MG/DL (ref 8.7–10.3)
CASTS URNS MICRO: ABNORMAL
CASTS URNS QL MICRO: PRESENT /LPF
CHLORIDE SERPL-SCNC: 98 MMOL/L (ref 96–106)
CO2 SERPL-SCNC: 23 MMOL/L (ref 20–29)
COLOR UR: YELLOW
CREAT SERPL-MCNC: 2.24 MG/DL (ref 0.57–1)
CREAT UR-MCNC: 211.7 MG/DL
EOSINOPHIL # BLD AUTO: 0.3 X10E3/UL (ref 0–0.4)
EOSINOPHIL NFR BLD AUTO: 4 %
EPI CELLS #/AREA URNS HPF: ABNORMAL /HPF (ref 0–10)
ERYTHROCYTE [DISTWIDTH] IN BLOOD BY AUTOMATED COUNT: 13.7 % (ref 11.7–15.4)
GLUCOSE SERPL-MCNC: 253 MG/DL (ref 65–99)
GLUCOSE UR QL: NEGATIVE
HCT VFR BLD AUTO: 38.9 % (ref 34–46.6)
HGB BLD-MCNC: 13 G/DL (ref 11.1–15.9)
HGB UR QL STRIP: NEGATIVE
IMM GRANULOCYTES # BLD: 0.1 X10E3/UL (ref 0–0.1)
IMM GRANULOCYTES NFR BLD: 1 %
KETONES UR QL STRIP: NEGATIVE
LEUKOCYTE ESTERASE UR QL STRIP: ABNORMAL
LYMPHOCYTES # BLD AUTO: 1.3 X10E3/UL (ref 0.7–3.1)
LYMPHOCYTES NFR BLD AUTO: 19 %
MCH RBC QN AUTO: 28.9 PG (ref 26.6–33)
MCHC RBC AUTO-ENTMCNC: 33.4 G/DL (ref 31.5–35.7)
MCV RBC AUTO: 86 FL (ref 79–97)
MICRO URNS: ABNORMAL
MICROALBUMIN UR-MCNC: 204.7 UG/ML
MONOCYTES # BLD AUTO: 0.7 X10E3/UL (ref 0.1–0.9)
MONOCYTES NFR BLD AUTO: 10 %
NEUTROPHILS # BLD AUTO: 4.6 X10E3/UL (ref 1.4–7)
NEUTROPHILS NFR BLD AUTO: 65 %
NITRITE UR QL STRIP: NEGATIVE
PH UR STRIP: 6 [PH] (ref 5–7.5)
PHOSPHATE SERPL-MCNC: 4.3 MG/DL (ref 3–4.3)
PLATELET # BLD AUTO: 237 X10E3/UL (ref 150–450)
POTASSIUM SERPL-SCNC: 3.8 MMOL/L (ref 3.5–5.2)
PROT UR QL STRIP: ABNORMAL
PTH-INTACT SERPL-MCNC: 48 PG/ML (ref 15–65)
RBC # BLD AUTO: 4.5 X10E6/UL (ref 3.77–5.28)
RBC #/AREA URNS HPF: ABNORMAL /HPF (ref 0–2)
SL AMB EGFR AFRICAN AMERICAN: 24 ML/MIN/1.73
SL AMB EGFR NON AFRICAN AMERICAN: 21 ML/MIN/1.73
SODIUM SERPL-SCNC: 138 MMOL/L (ref 134–144)
SP GR UR: 1.02 (ref 1–1.03)
URATE SERPL-MCNC: 10.9 MG/DL (ref 2.5–7.1)
UROBILINOGEN UR STRIP-ACNC: 0.2 MG/DL (ref 0.2–1)
WBC # BLD AUTO: 7 X10E3/UL (ref 3.4–10.8)
WBC #/AREA URNS HPF: ABNORMAL /HPF (ref 0–5)

## 2020-10-22 PROCEDURE — 3060F POS MICROALBUMINURIA REV: CPT | Performed by: NURSE PRACTITIONER

## 2020-10-25 DIAGNOSIS — E78.00 HYPERCHOLESTEREMIA: ICD-10-CM

## 2020-10-27 RX ORDER — ICOSAPENT ETHYL 1000 MG/1
CAPSULE ORAL
Qty: 360 CAPSULE | Refills: 2 | Status: SHIPPED | OUTPATIENT
Start: 2020-10-27 | End: 2021-01-20

## 2020-11-03 ENCOUNTER — OFFICE VISIT (OUTPATIENT)
Dept: NEPHROLOGY | Facility: CLINIC | Age: 75
End: 2020-11-03
Payer: COMMERCIAL

## 2020-11-03 VITALS
BODY MASS INDEX: 35.32 KG/M2 | RESPIRATION RATE: 16 BRPM | DIASTOLIC BLOOD PRESSURE: 84 MMHG | HEART RATE: 73 BPM | WEIGHT: 175.2 LBS | SYSTOLIC BLOOD PRESSURE: 164 MMHG | TEMPERATURE: 97.3 F | HEIGHT: 59 IN

## 2020-11-03 DIAGNOSIS — N18.4 HYPERTENSIVE KIDNEY DISEASE WITH STAGE 4 CHRONIC KIDNEY DISEASE (HCC): ICD-10-CM

## 2020-11-03 DIAGNOSIS — I12.9 HYPERTENSIVE KIDNEY DISEASE WITH STAGE 4 CHRONIC KIDNEY DISEASE (HCC): ICD-10-CM

## 2020-11-03 DIAGNOSIS — N18.4 STAGE 4 CHRONIC KIDNEY DISEASE (HCC): Primary | ICD-10-CM

## 2020-11-03 DIAGNOSIS — E79.0 HYPERURICEMIA: ICD-10-CM

## 2020-11-03 DIAGNOSIS — R80.8 OTHER PROTEINURIA: ICD-10-CM

## 2020-11-03 DIAGNOSIS — E55.9 VITAMIN D DEFICIENCY: ICD-10-CM

## 2020-11-03 PROCEDURE — 3066F NEPHROPATHY DOC TX: CPT | Performed by: INTERNAL MEDICINE

## 2020-11-03 PROCEDURE — 1036F TOBACCO NON-USER: CPT | Performed by: INTERNAL MEDICINE

## 2020-11-03 PROCEDURE — 1160F RVW MEDS BY RX/DR IN RCRD: CPT | Performed by: INTERNAL MEDICINE

## 2020-11-03 PROCEDURE — 99214 OFFICE O/P EST MOD 30 MIN: CPT | Performed by: INTERNAL MEDICINE

## 2020-11-03 RX ORDER — ALLOPURINOL 100 MG/1
100 TABLET ORAL DAILY
Qty: 90 TABLET | Refills: 2 | Status: SHIPPED | OUTPATIENT
Start: 2020-11-03 | End: 2021-07-19

## 2020-11-10 DIAGNOSIS — E11.59 HYPERTENSION ASSOCIATED WITH DIABETES (HCC): ICD-10-CM

## 2020-11-10 DIAGNOSIS — I15.2 HYPERTENSION ASSOCIATED WITH DIABETES (HCC): ICD-10-CM

## 2020-11-11 PROCEDURE — 4010F ACE/ARB THERAPY RXD/TAKEN: CPT | Performed by: INTERNAL MEDICINE

## 2020-11-11 RX ORDER — LOSARTAN POTASSIUM 100 MG/1
TABLET ORAL
Qty: 135 TABLET | Refills: 1 | Status: SHIPPED | OUTPATIENT
Start: 2020-11-11 | End: 2021-01-20 | Stop reason: SDUPTHER

## 2020-11-12 ENCOUNTER — VBI (OUTPATIENT)
Dept: ADMINISTRATIVE | Facility: OTHER | Age: 75
End: 2020-11-12

## 2020-11-13 ENCOUNTER — PROCEDURE VISIT (OUTPATIENT)
Dept: UROLOGY | Facility: CLINIC | Age: 75
End: 2020-11-13
Payer: COMMERCIAL

## 2020-11-13 VITALS
HEIGHT: 59 IN | DIASTOLIC BLOOD PRESSURE: 70 MMHG | SYSTOLIC BLOOD PRESSURE: 130 MMHG | TEMPERATURE: 97.1 F | BODY MASS INDEX: 35.28 KG/M2 | HEART RATE: 76 BPM | WEIGHT: 175 LBS

## 2020-11-13 DIAGNOSIS — C67.9 MALIGNANT NEOPLASM OF URINARY BLADDER, UNSPECIFIED SITE (HCC): Primary | ICD-10-CM

## 2020-11-13 PROCEDURE — 3008F BODY MASS INDEX DOCD: CPT | Performed by: INTERNAL MEDICINE

## 2020-11-13 PROCEDURE — 52000 CYSTOURETHROSCOPY: CPT | Performed by: UROLOGY

## 2020-12-11 ENCOUNTER — TELEMEDICINE (OUTPATIENT)
Dept: VASCULAR SURGERY | Facility: CLINIC | Age: 75
End: 2020-12-11
Payer: COMMERCIAL

## 2020-12-11 VITALS
BODY MASS INDEX: 35.48 KG/M2 | WEIGHT: 176 LBS | HEART RATE: 75 BPM | TEMPERATURE: 97.6 F | SYSTOLIC BLOOD PRESSURE: 140 MMHG | DIASTOLIC BLOOD PRESSURE: 70 MMHG | HEIGHT: 59 IN

## 2020-12-11 DIAGNOSIS — I70.213 ATHEROSCLEROSIS OF NATIVE ARTERIES OF EXTREMITIES WITH INTERMITTENT CLAUDICATION, BILATERAL LEGS (HCC): Primary | ICD-10-CM

## 2020-12-11 DIAGNOSIS — I70.1 ATHEROSCLEROSIS OF RENAL ARTERY (HCC): ICD-10-CM

## 2020-12-11 DIAGNOSIS — I65.23 ASYMPTOMATIC BILATERAL CAROTID ARTERY STENOSIS: ICD-10-CM

## 2020-12-11 DIAGNOSIS — I70.0 AORTOILIAC STENOSIS, LEFT (HCC): ICD-10-CM

## 2020-12-11 PROCEDURE — 3008F BODY MASS INDEX DOCD: CPT | Performed by: INTERNAL MEDICINE

## 2020-12-11 PROCEDURE — 99442 PR PHYS/QHP TELEPHONE EVALUATION 11-20 MIN: CPT | Performed by: SURGERY

## 2020-12-30 ENCOUNTER — LAB (OUTPATIENT)
Dept: LAB | Facility: HOSPITAL | Age: 75
End: 2020-12-30
Attending: INTERNAL MEDICINE
Payer: COMMERCIAL

## 2020-12-30 DIAGNOSIS — I12.9 HYPERTENSIVE KIDNEY DISEASE WITH STAGE 4 CHRONIC KIDNEY DISEASE (HCC): ICD-10-CM

## 2020-12-30 DIAGNOSIS — N18.4 STAGE 4 CHRONIC KIDNEY DISEASE (HCC): ICD-10-CM

## 2020-12-30 DIAGNOSIS — E79.0 HYPERURICEMIA: ICD-10-CM

## 2020-12-30 DIAGNOSIS — N18.4 HYPERTENSIVE KIDNEY DISEASE WITH STAGE 4 CHRONIC KIDNEY DISEASE (HCC): ICD-10-CM

## 2020-12-30 DIAGNOSIS — E55.9 VITAMIN D DEFICIENCY: ICD-10-CM

## 2020-12-30 DIAGNOSIS — R80.8 OTHER PROTEINURIA: ICD-10-CM

## 2020-12-30 LAB
25(OH)D3 SERPL-MCNC: 25.3 NG/ML (ref 30–100)
ANION GAP SERPL CALCULATED.3IONS-SCNC: 13 MMOL/L (ref 4–13)
BASOPHILS # BLD AUTO: 0.04 THOUSANDS/ΜL (ref 0–0.1)
BASOPHILS NFR BLD AUTO: 1 % (ref 0–1)
BUN SERPL-MCNC: 44 MG/DL (ref 5–25)
CALCIUM SERPL-MCNC: 9.9 MG/DL (ref 8.3–10.1)
CHLORIDE SERPL-SCNC: 103 MMOL/L (ref 100–108)
CO2 SERPL-SCNC: 24 MMOL/L (ref 21–32)
CREAT SERPL-MCNC: 2.14 MG/DL (ref 0.6–1.3)
EOSINOPHIL # BLD AUTO: 0.31 THOUSAND/ΜL (ref 0–0.61)
EOSINOPHIL NFR BLD AUTO: 5 % (ref 0–6)
ERYTHROCYTE [DISTWIDTH] IN BLOOD BY AUTOMATED COUNT: 14 % (ref 11.6–15.1)
GFR SERPL CREATININE-BSD FRML MDRD: 22 ML/MIN/1.73SQ M
GLUCOSE P FAST SERPL-MCNC: 222 MG/DL (ref 65–99)
HCT VFR BLD AUTO: 39.9 % (ref 34.8–46.1)
HGB BLD-MCNC: 13.3 G/DL (ref 11.5–15.4)
IMM GRANULOCYTES # BLD AUTO: 0.03 THOUSAND/UL (ref 0–0.2)
IMM GRANULOCYTES NFR BLD AUTO: 1 % (ref 0–2)
LYMPHOCYTES # BLD AUTO: 1.27 THOUSANDS/ΜL (ref 0.6–4.47)
LYMPHOCYTES NFR BLD AUTO: 19 % (ref 14–44)
MCH RBC QN AUTO: 29.3 PG (ref 26.8–34.3)
MCHC RBC AUTO-ENTMCNC: 33.3 G/DL (ref 31.4–37.4)
MCV RBC AUTO: 88 FL (ref 82–98)
MONOCYTES # BLD AUTO: 0.56 THOUSAND/ΜL (ref 0.17–1.22)
MONOCYTES NFR BLD AUTO: 9 % (ref 4–12)
NEUTROPHILS # BLD AUTO: 4.37 THOUSANDS/ΜL (ref 1.85–7.62)
NEUTS SEG NFR BLD AUTO: 65 % (ref 43–75)
NRBC BLD AUTO-RTO: 0 /100 WBCS
PHOSPHATE SERPL-MCNC: 3.5 MG/DL (ref 2.3–4.1)
PLATELET # BLD AUTO: 219 THOUSANDS/UL (ref 149–390)
PMV BLD AUTO: 10.2 FL (ref 8.9–12.7)
POTASSIUM SERPL-SCNC: 3.5 MMOL/L (ref 3.5–5.3)
PTH-INTACT SERPL-MCNC: 88 PG/ML (ref 18.4–80.1)
RBC # BLD AUTO: 4.54 MILLION/UL (ref 3.81–5.12)
SODIUM SERPL-SCNC: 140 MMOL/L (ref 136–145)
URATE SERPL-MCNC: 8.7 MG/DL (ref 2–6.8)
WBC # BLD AUTO: 6.58 THOUSAND/UL (ref 4.31–10.16)

## 2020-12-30 PROCEDURE — 3066F NEPHROPATHY DOC TX: CPT | Performed by: INTERNAL MEDICINE

## 2020-12-30 PROCEDURE — 80048 BASIC METABOLIC PNL TOTAL CA: CPT

## 2020-12-30 PROCEDURE — 82306 VITAMIN D 25 HYDROXY: CPT

## 2020-12-30 PROCEDURE — 84550 ASSAY OF BLOOD/URIC ACID: CPT

## 2020-12-30 PROCEDURE — 81001 URINALYSIS AUTO W/SCOPE: CPT

## 2020-12-30 PROCEDURE — 82570 ASSAY OF URINE CREATININE: CPT

## 2020-12-30 PROCEDURE — 84100 ASSAY OF PHOSPHORUS: CPT

## 2020-12-30 PROCEDURE — 83970 ASSAY OF PARATHORMONE: CPT

## 2020-12-30 PROCEDURE — 36415 COLL VENOUS BLD VENIPUNCTURE: CPT

## 2020-12-30 PROCEDURE — 82043 UR ALBUMIN QUANTITATIVE: CPT

## 2020-12-30 PROCEDURE — 85025 COMPLETE CBC W/AUTO DIFF WBC: CPT

## 2020-12-31 LAB
BACTERIA UR QL AUTO: ABNORMAL /HPF
BILIRUB UR QL STRIP: NEGATIVE
CLARITY UR: CLEAR
COLOR UR: YELLOW
CREAT UR-MCNC: 145 MG/DL
GLUCOSE UR STRIP-MCNC: NEGATIVE MG/DL
HGB UR QL STRIP.AUTO: NEGATIVE
KETONES UR STRIP-MCNC: NEGATIVE MG/DL
LEUKOCYTE ESTERASE UR QL STRIP: ABNORMAL
MICROALBUMIN UR-MCNC: 256 MG/L (ref 0–20)
MICROALBUMIN/CREAT 24H UR: 177 MG/G CREATININE (ref 0–30)
NITRITE UR QL STRIP: NEGATIVE
NON-SQ EPI CELLS URNS QL MICRO: ABNORMAL /HPF
PH UR STRIP.AUTO: 6 [PH]
PROT UR STRIP-MCNC: ABNORMAL MG/DL
RBC #/AREA URNS AUTO: ABNORMAL /HPF
SP GR UR STRIP.AUTO: 1.02 (ref 1–1.03)
UROBILINOGEN UR QL STRIP.AUTO: 0.2 E.U./DL
WBC #/AREA URNS AUTO: ABNORMAL /HPF

## 2020-12-31 PROCEDURE — 3060F POS MICROALBUMINURIA REV: CPT | Performed by: INTERNAL MEDICINE

## 2021-01-11 ENCOUNTER — TELEPHONE (OUTPATIENT)
Dept: NEPHROLOGY | Facility: CLINIC | Age: 76
End: 2021-01-11

## 2021-01-12 ENCOUNTER — OFFICE VISIT (OUTPATIENT)
Dept: NEPHROLOGY | Facility: CLINIC | Age: 76
End: 2021-01-12
Payer: COMMERCIAL

## 2021-01-12 VITALS
RESPIRATION RATE: 16 BRPM | HEIGHT: 59 IN | HEART RATE: 86 BPM | SYSTOLIC BLOOD PRESSURE: 130 MMHG | DIASTOLIC BLOOD PRESSURE: 74 MMHG | TEMPERATURE: 97.3 F | WEIGHT: 171.2 LBS | BODY MASS INDEX: 34.51 KG/M2

## 2021-01-12 DIAGNOSIS — N18.4 HYPERTENSIVE KIDNEY DISEASE WITH STAGE 4 CHRONIC KIDNEY DISEASE (HCC): ICD-10-CM

## 2021-01-12 DIAGNOSIS — I12.9 HYPERTENSIVE KIDNEY DISEASE WITH STAGE 4 CHRONIC KIDNEY DISEASE (HCC): ICD-10-CM

## 2021-01-12 DIAGNOSIS — E79.0 HYPERURICEMIA: ICD-10-CM

## 2021-01-12 DIAGNOSIS — N18.4 STAGE 4 CHRONIC KIDNEY DISEASE (HCC): Primary | ICD-10-CM

## 2021-01-12 DIAGNOSIS — R80.8 OTHER PROTEINURIA: ICD-10-CM

## 2021-01-12 DIAGNOSIS — E55.9 VITAMIN D DEFICIENCY: ICD-10-CM

## 2021-01-12 PROCEDURE — 3066F NEPHROPATHY DOC TX: CPT | Performed by: NURSE PRACTITIONER

## 2021-01-12 PROCEDURE — 99214 OFFICE O/P EST MOD 30 MIN: CPT | Performed by: INTERNAL MEDICINE

## 2021-01-12 NOTE — PROGRESS NOTES
NEPHROLOGY OFFICE FOLLOW-UP  Reji Thomas 70 y o  @SEX @ YOB: 1945 MRN: [id number]    Encounter: [id number] DATE: 1/12/2021    REASON FOR VISIT: Reji Thomas is a 70 y  o female returns to Nephrology office for further management of chronic kidney disease  HPI:    This is 70-year-old female with past medical history significant for hypertension, hyperlipidemia, spinal stenosis, peripheral vascular disease, bladder cancer, returns to Nephrology office for further management of CKD stage 4  Upon review of old medical records, new baseline creatinine seems to be fluctuating around 2 1-2 2  Dialysis been discussed in the past and patient has refused dialysis even though it would be life-saving procedure  Patient was also found to have bladder cancer and now been followed by urologist-Dr Milady Cabezas and had underwent cystoscopy in the past     Patient has underlying hypertension and had underwent renal artery Doppler on 1/7/2020 and also found to have > 60% narrowing of right main renal artery  Left side was difficult to evaluate  Patient said that she has been checking blood pressure at home which has been fluctuating  Patient has quit smoking in the past     Patient also have lower extremity swelling and in the interim amlodipine dose was reduced to 2 5 mg PO daily by PCP and according to patient her leg swelling has also significantly improved since that time  Patient is also currently been followed by neurologist for CVA-lacunar infarct  Patient takes all the medications as prescribed and denies use of NSAIDs  REVIEW OF SYSTEMS:    Review of Systems   Constitutional: Negative for chills and fever  HENT: Negative for nosebleeds and sore throat  Eyes: Negative for photophobia and pain  Respiratory: Negative for choking and wheezing  Cardiovascular: Negative for chest pain and palpitations  Gastrointestinal: Negative for abdominal pain and blood in stool  Endocrine: Negative for heat intolerance  Genitourinary: Negative for flank pain and hematuria  Musculoskeletal: Negative for joint swelling and neck pain  Skin: Negative for color change and pallor  Neurological: Negative for seizures and syncope  Psychiatric/Behavioral: Negative for confusion and suicidal ideas  PAST MEDICAL HISTORY:  Past Medical History:   Diagnosis Date    Arthritis     Chronic kidney disease     Coronary artery disease     Diabetes mellitus ([facility] )     Patient states she is not Diabetic    Endometriosis     High cholesterol     Hypertension     Kidney disease, chronic, stage III (moderate, EGFR 30-59 ml/min)     Lumbar disc herniation     Neuropathy     Peripheral vascular disease (HCC)     Pneumonia     Shoulder injury     left    Spinal stenosis     Stroke ([facility] ) 2015    Memory loss       PAST SURGICAL HISTORY:  Past Surgical History:   Procedure Laterality Date    CARDIAC CATHETERIZATION      COLONOSCOPY      FL RETROGRADE PYELOGRAM  4/6/2020    FRACTURE SURGERY Right     ankle    OVARIAN CYST SURGERY      MN CYSTOSCOPY,INSERT URETERAL STENT Right 4/6/2020    Procedure: INSERTION STENT URETERAL;  Surgeon: Angelo Gallardo MD;  [facility];  Service: Urology    MN CYSTOURETHROSCOPY,FULGUR 0 5-2 CM LESN N/A 4/6/2020    Procedure: TRANSURETHRAL RESECTION OF BLADDER TUMOR (TURBT);   Surgeon: Angelo Gallardo MD;  [facility];  Service: Urology    MN CYSTOURETHROSCOPY,URETER CATHETER Bilateral 4/6/2020    Procedure: CYSTOSCOPY WITH RETROGRADE PYELOGRAM;  Surgeon: Angelo Gallardo MD;  [facility];  Service: Urology    ROTATOR CUFF REPAIR Left     TONSILLECTOMY         SOCIAL HISTORY:  Social History     Substance and Sexual Activity   Alcohol Use No     Social History     Substance and Sexual Activity   Drug Use No     Social History     Tobacco Use   Smoking Status Former Smoker    Packs/day: 0 00    Types: Cigarettes    Quit date: 2020    Years since quittin 4   Smokeless Tobacco Never Used   Tobacco Comment    3 cigarettes per week       FAMILY HISTORY:  Family History   Problem Relation Age of Onset    Hypertension Mother     Heart disease Mother         Valvular    Hyperlipidemia Mother     Hypertension Father     Heart defect Father         Cardiomegaly    Stroke Sister         Cerebrovascular Accident    Arthritis Brother     Other Brother         Back Disorder       ALLERGY:  Allergies   Allergen Reactions    Fenofibrate Other (See Comments)      blood in urine  hx  Kidney Failure    Colesevelam Other (See Comments)      leg pains    Colestipol Itching and Other (See Comments)      Swelling lower legs    Ezetimibe GI Intolerance    Pollen Extract Allergic Rhinitis    Statins Myalgia       MEDICATIONS:    Current Outpatient Medications:     allopurinol (ZYLOPRIM) 100 mg tablet, Take 1 tablet (100 mg total) by mouth daily, Disp: 90 tablet, Rfl: 2    amLODIPine (NORVASC) 2 5 mg tablet, Take 1 tablet (2 5 mg total) by mouth daily, Disp: 90 tablet, Rfl: 2    chlorthalidone 25 mg tablet, Take 1 tablet (25 mg total) by mouth daily, Disp: 90 tablet, Rfl: 2    Cholecalciferol 50 MCG (2000 UT) TABS, Take 1 tablet (2,000 Units total) by mouth daily, Disp: , Rfl:     cilostazol (PLETAL) 100 mg tablet, Take 1 tablet (100 mg total) by mouth 2 (two) times a day, Disp: 60 tablet, Rfl: 5    cloNIDine (CATAPRES-TTS-3) 0 3 mg/24 hr, Place 1 patch (0 3 mg total) on the skin once a week, Disp: 12 patch, Rfl: 2    clopidogrel (PLAVIX) 75 mg tablet, TAKE 1 TABLET BY MOUTH EVERY DAY, Disp: 90 tablet, Rfl: 1    Icosapent Ethyl (Vascepa) 1 g CAPS, TAKE 2 CAPSULES BY MOUTH TWICE A DAY WITH MEALS, Disp: 360 capsule, Rfl: 2    labetalol (NORMODYNE) 300 mg tablet, Take 1 tablet (300 mg total) by mouth 2 (two) times a day, Disp: 270 tablet, Rfl: 2    losartan (COZAAR) 100 MG tablet, TAKE 1 & 1/2 TABLETS DAILY, Disp: 135 tablet, Rfl: 1    PHYSICAL EXAM:  Vitals:    01/12/21 0857   BP: 130/74   BP Location: Left arm   Patient Position: Sitting   Cuff Size: Large   Pulse: 86   Resp: 16   Temp: (!) 97 3 °F (36 3 °C)   TempSrc: Temporal   Weight: 77 7 kg (171 lb 3 2 oz)   Height: 4' 10 5" (1 486 m)     Body mass index is 35 17 kg/m²  Physical Exam  Constitutional:       General: She is not in acute distress  HENT:      Head: Normocephalic and atraumatic  Eyes:      General: No scleral icterus  Neck:      Musculoskeletal: Neck supple  Vascular: No JVD  Cardiovascular:      Rate and Rhythm: Normal rate  Heart sounds: S1 normal and S2 normal    Pulmonary:      Effort: Pulmonary effort is normal  No accessory muscle usage or respiratory distress  Breath sounds: No wheezing  Abdominal:      General: There is no distension  Palpations: Abdomen is soft  Musculoskeletal:      Right ankle: She exhibits no swelling  Left ankle: She exhibits no swelling  Comments: Moving all extremities   Skin:     General: Skin is warm  Comments: No jaundice    Neurological:      Mental Status: She is alert  She is not disoriented  Comments: Facial symmetry  Speech is clear   Psychiatric:         Speech: She is communicative  Behavior: Behavior is not combative           LAB RESULTS:  Results for orders placed or performed in visit on 12/30/20   CBC and differential   Result Value Ref Range    WBC 6 58 4 31 - 10 16 Thousand/uL    RBC 4 54 3 81 - 5 12 Million/uL    Hemoglobin 13 3 11 5 - 15 4 g/dL    Hematocrit 39 9 34 8 - 46 1 %    MCV 88 82 - 98 fL    MCH 29 3 26 8 - 34 3 pg    MCHC 33 3 31 4 - 37 4 g/dL    RDW 14 0 11 6 - 15 1 %    MPV 10 2 8 9 - 12 7 fL    Platelets 753 720 - 596 Thousands/uL    nRBC 0 /100 WBCs    Neutrophils Relative 65 43 - 75 %    Immat GRANS % 1 0 - 2 %    Lymphocytes Relative 19 14 - 44 %    Monocytes Relative 9 4 - 12 %    Eosinophils Relative 5 0 - 6 %    Basophils Relative 1 0 - 1 %    Neutrophils Absolute 4 37 1 85 - 7 62 Thousands/µL    Immature Grans Absolute 0 03 0 00 - 0 20 Thousand/uL    Lymphocytes Absolute 1 27 0 60 - 4 47 Thousands/µL    Monocytes Absolute 0 56 0 17 - 1 22 Thousand/µL    Eosinophils Absolute 0 31 0 00 - 0 61 Thousand/µL    Basophils Absolute 0 04 0 00 - 0 10 Thousands/µL   Basic metabolic panel   Result Value Ref Range    Sodium 140 136 - 145 mmol/L    Potassium 3 5 3 5 - 5 3 mmol/L    Chloride 103 100 - 108 mmol/L    CO2 24 21 - 32 mmol/L    ANION GAP 13 4 - 13 mmol/L    BUN 44 (H) 5 - 25 mg/dL    Creatinine 2 14 (H) 0 60 - 1 30 mg/dL    Glucose, Fasting 222 (H) 65 - 99 mg/dL    Calcium 9 9 8 3 - 10 1 mg/dL    eGFR 22 ml/min/1 73sq m   UA (URINE) with reflex to Scope   Result Value Ref Range    Color, UA Yellow     Clarity, UA Clear     Specific Gravity, UA 1 025 1 003 - 1 030    pH, UA 6 0 4 5, 5 0, 5 5, 6 0, 6 5, 7 0, 7 5, 8 0    Leukocytes, UA Small (A) Negative    Nitrite, UA Negative Negative    Protein, UA 30 (1+) (A) Negative mg/dl    Glucose, UA Negative Negative mg/dl    Ketones, UA Negative Negative mg/dl    Urobilinogen, UA 0 2 0 2, 1 0 E U /dl E U /dl    Bilirubin, UA Negative Negative    Blood, UA Negative Negative   Microalbumin / creatinine urine ratio   Result Value Ref Range    Creatinine, Ur 145 0 mg/dL    Microalbum  ,U,Random 256 0 (H) 0 0 - 20 0 mg/L    Microalb Creat Ratio 177 (H) 0 - 30 mg/g creatinine   Phosphorus   Result Value Ref Range    Phosphorus 3 5 2 3 - 4 1 mg/dL   Uric acid   Result Value Ref Range    Uric Acid 8 7 (H) 2 0 - 6 8 mg/dL   PTH, intact   Result Value Ref Range    PTH 88 0 (H) 18 4 - 80 1 pg/mL   Vitamin D 25 hydroxy   Result Value Ref Range    Vit D, 25-Hydroxy 25 3 (L) 30 0 - 100 0 ng/mL   Urine Microscopic   Result Value Ref Range    RBC, UA None Seen None Seen, 0-1, 1-2, 2-4, 0-5 /hpf    WBC, UA 4-10 (A) None Seen, 0-1, 1-2, 0-5, 2-4 /hpf    Epithelial Cells Moderate (A) None Seen, Occasional /hpf    Bacteria, UA Moderate (A) None Seen, Occasional /hpf     Renal ultrasound done on 10/29/2019 showed nodular density in floor of urinary bladder suspicious for cancer/malignancy  Atrophic left kidney  Renal artery Doppler done on 1/7/2020 showed > 60% narrowing/stenosis of right main renal artery  Left side was difficult to evaluate  ASSESSMENT and PLAN:  Gypsy Astorga was seen today for chronic kidney disease and follow-up  Diagnoses and all orders for this visit:    Stage 4 chronic kidney disease ([address] )  -     CBC and differential; Future  -     Basic metabolic panel; Future  -     UA (URINE) with reflex to Scope; Future  -     Microalbumin / creatinine urine ratio; Future  -     Phosphorus; Future  -     Uric acid; Future  -     PTH, intact; Future  -     Vitamin D 25 hydroxy; Future    Hypertensive kidney disease with stage 4 chronic kidney disease (HCC)  -     Basic metabolic panel; Future  -     UA (URINE) with reflex to Scope; Future  -     Microalbumin / creatinine urine ratio; Future    Other proteinuria  -     UA (URINE) with reflex to Scope; Future  -     Microalbumin / creatinine urine ratio; Future    Hyperuricemia  -     Uric acid; Future    Vitamin D deficiency  -     Vitamin D 25 hydroxy; Future  -     Cholecalciferol 50 MCG (2000 UT) TABS; Take 2 tablets (4,000 Units total) by mouth daily      1  CKD stage 4  Multifactorial and was suspected due to underlying hypertensive nephrosclerosis on top of atrophic left kidney and advanced age  Upon review of old medical records, new baseline creatinine seems to be fluctuating around 2 1-2 2 and in the interim renal function has remained relatively stable with current creatinine of 2 14 with EGFR of 22  Dialysis been discussed in the past and patient has opted against dialysis even though it would be life-saving procedure      Patient was also diagnosed with bladder cancer earlier and defer further management to urologist      Management of hypertension as mentioned below   Plan to avoid NSAIDs and recheck renal function before next visit  2  Hypertension in chronic kidney disease  Today's blood pressure was under well control and plan to monitor hypertension with losartan 100 mg PO daily, labetalol to 300 mg PO BID and will plan to continue amlodipine 2 5 mg PO daily, chlorthalidone 25 mg PO daily and clonidine patch 0 3 mg per week  Patient was also advised to make a log of blood pressure reading for our review with the next visit  Patient had underwent renal artery Doppler on 1/7/2020 and also found to have > 60% stenosis of right main renal artery  Would recommend medical management prior to considering renal artery stenting  3  Proteinuria  Multifactorial, current urine microalbumin to creatinine ratio is 177 mg  Continue losartan 100 mg PO daily  4  Hyperuricemia  Multifactorial, suspected due to underlying chronic kidney disease on top of gout  Now patient is taking allopurinol 100 mg PO daily and in the interim uric acid level has improved to 8 7  Plan to continue allopurinol at current dose and recheck uric acid level with the next visit  5  Vitamin-D deficiency  Currently patient is taking cholecalciferol 2000 Units PO daily with current vitamin-D level of 25 with PTH of 88  Plan to increase cholecalciferol to 4000 Units PO daily  Recheck vitamin-D and PTH level with the next visit  Currently calcium level is at goal     Returns to Nephrology office in 3 months  Future labs ordered  Labs ( reviewed on 4/13/2021)   Creatinine 1 9 with EGFR of 26 -> patient has opted against dialysis  Hemoglobin 13  7  hemoglobin A1c 7 5%  urine analysis showed no dysuria  Urine microalbumin : creatinine ratio of 212 mg -> on losartan   PTH 81 -> monitor   Vitamin- D 43 -> continue cholecalciferol   Uric acid 8 4 -> continue allopurinol   Normal sodium, potassium, bicarb, calcium and phosphorus         Portions of the record may have been created with voice recognition software  Occasional wrong word or "sound a like" substitutions may have occurred due to the inherent limitations of voice recognition software  Read the chart carefully and recognize, using context, where substitutions have occurred  If you have any questions, please contact the dictating provider

## 2021-01-12 NOTE — LETTER
January 12, 2021     Townsend Roche, [address] [facility] [address]  [facility] [id number]    Patient: Tristan Tyler   YOB: 1945   Date of Visit: 1/12/2021       Dear Dr HERNANDEZ [facility] CARE: Thank you for referring Lawrence Newell to me for evaluation  Below are my notes for this consultation  If you have questions, please do not hesitate to call me  I look forward to following your patient along with you  Sincerely,        Moses Abdul MD        CC: No Recipients  Moses Abdul MD  1/12/2021  9:13 AM  Sign when Signing Visit  NEPHROLOGY OFFICE FOLLOW-UP  Tristan Tyler 70 y o  @SEX @ YOB: 1945 MRN: [id number]    Encounter: [id number] DATE: 1/12/2021    REASON FOR VISIT: Tristan Tyler is a 70 y  o female returns to Nephrology office for further management of chronic kidney disease  HPI:    This is 51-year-old female with past medical history significant for hypertension, hyperlipidemia, spinal stenosis, peripheral vascular disease, bladder cancer, returns to Nephrology office for further management of CKD stage 4  Upon review of old medical records, new baseline creatinine seems to be fluctuating around 2 1-2 2  Dialysis been discussed in the past and patient has refused dialysis even though it would be life-saving procedure  Patient was also found to have bladder cancer and now been followed by urologist-Dr Alma Palumbo and had underwent cystoscopy in the past     Patient has underlying hypertension and had underwent renal artery Doppler on 1/7/2020 and also found to have > 60% narrowing of right main renal artery  Left side was difficult to evaluate  Patient said that she has been checking blood pressure at home which has been fluctuating      Patient has quit smoking in the past     Patient also have lower extremity swelling and in the interim amlodipine dose was reduced to 2 5 mg PO daily by PCP and according to patient her leg swelling has also significantly improved since that time     Patient is also currently been followed by neurologist for CVA-lacunar infarct  Patient takes all the medications as prescribed and denies use of NSAIDs  REVIEW OF SYSTEMS:    Review of Systems   Constitutional: Negative for chills and fever  HENT: Negative for nosebleeds and sore throat  Eyes: Negative for photophobia and pain  Respiratory: Negative for choking and wheezing  Cardiovascular: Negative for chest pain and palpitations  Gastrointestinal: Negative for abdominal pain and blood in stool  Endocrine: Negative for heat intolerance  Genitourinary: Negative for flank pain and hematuria  Musculoskeletal: Negative for joint swelling and neck pain  Skin: Negative for color change and pallor  Neurological: Negative for seizures and syncope  Psychiatric/Behavioral: Negative for confusion and suicidal ideas  PAST MEDICAL HISTORY:  Past Medical History:   Diagnosis Date    Arthritis     Chronic kidney disease     Coronary artery disease     Diabetes mellitus ([facility] [address] )     Patient states she is not Diabetic    Endometriosis     High cholesterol     Hypertension     Kidney disease, chronic, stage III (moderate, EGFR 30-59 ml/min)     Lumbar disc herniation     Neuropathy     Peripheral vascular disease (HCC)     Pneumonia     Shoulder injury     left    Spinal stenosis     Stroke ([facility] [address] ) 2015    Memory loss       PAST SURGICAL HISTORY:  Past Surgical History:   Procedure Laterality Date    CARDIAC CATHETERIZATION      COLONOSCOPY      FL RETROGRADE PYELOGRAM  4/6/2020    FRACTURE SURGERY Right     ankle    OVARIAN CYST SURGERY      CA CYSTOSCOPY,INSERT URETERAL STENT Right 4/6/2020    Procedure: INSERTION STENT URETERAL;  Surgeon: Apple Montemayor MD;  [facility];  Service: Urology    CA CYSTOURETHROSCOPY,FULGUR 0 5-2 CM LESN N/A 4/6/2020    Procedure: TRANSURETHRAL RESECTION OF BLADDER TUMOR (TURBT);   Surgeon: Apple Montemayor MD;  [facility];  Service: Urology    KY CYSTOURETHROSCOPY,URETER CATHETER Bilateral 2020    Procedure: CYSTOSCOPY WITH RETROGRADE PYELOGRAM;  Surgeon: Kelsey Lim MD;  [facility];  Service: Urology    ROTATOR CUFF REPAIR Left     TONSILLECTOMY         SOCIAL HISTORY:  Social History     Substance and Sexual Activity   Alcohol Use No     Social History     Substance and Sexual Activity   Drug Use No     Social History     Tobacco Use   Smoking Status Former Smoker    Packs/day: 0 00    Types: Cigarettes    Quit date: 2020    Years since quittin 4   Smokeless Tobacco Never Used   Tobacco Comment    3 cigarettes per week       FAMILY HISTORY:  Family History   Problem Relation Age of Onset    Hypertension Mother     Heart disease Mother         Valvular    Hyperlipidemia Mother     Hypertension Father     Heart defect Father         Cardiomegaly    Stroke Sister         Cerebrovascular Accident    Arthritis Brother     Other Brother         Back Disorder       ALLERGY:  Allergies   Allergen Reactions    Fenofibrate Other (See Comments)      blood in urine  hx  Kidney Failure    Colesevelam Other (See Comments)      leg pains    Colestipol Itching and Other (See Comments)      Swelling lower legs    Ezetimibe GI Intolerance    Pollen Extract Allergic Rhinitis    Statins Myalgia       MEDICATIONS:    Current Outpatient Medications:     allopurinol (ZYLOPRIM) 100 mg tablet, Take 1 tablet (100 mg total) by mouth daily, Disp: 90 tablet, Rfl: 2    amLODIPine (NORVASC) 2 5 mg tablet, Take 1 tablet (2 5 mg total) by mouth daily, Disp: 90 tablet, Rfl: 2    chlorthalidone 25 mg tablet, Take 1 tablet (25 mg total) by mouth daily, Disp: 90 tablet, Rfl: 2    Cholecalciferol 50 MCG (2000 UT) TABS, Take 1 tablet (2,000 Units total) by mouth daily, Disp: , Rfl:     cilostazol (PLETAL) 100 mg tablet, Take 1 tablet (100 mg total) by mouth 2 (two) times a day, Disp: 60 tablet, Rfl: 5   cloNIDine (CATAPRES-TTS-3) 0 3 mg/24 hr, Place 1 patch (0 3 mg total) on the skin once a week, Disp: 12 patch, Rfl: 2    clopidogrel (PLAVIX) 75 mg tablet, TAKE 1 TABLET BY MOUTH EVERY DAY, Disp: 90 tablet, Rfl: 1    Icosapent Ethyl (Vascepa) 1 g CAPS, TAKE 2 CAPSULES BY MOUTH TWICE A DAY WITH MEALS, Disp: 360 capsule, Rfl: 2    labetalol (NORMODYNE) 300 mg tablet, Take 1 tablet (300 mg total) by mouth 2 (two) times a day, Disp: 270 tablet, Rfl: 2    losartan (COZAAR) 100 MG tablet, TAKE 1 & 1/2 TABLETS DAILY, Disp: 135 tablet, Rfl: 1    PHYSICAL EXAM:  Vitals:    01/12/21 0857   BP: 130/74   BP Location: Left arm   Patient Position: Sitting   Cuff Size: Large   Pulse: 86   Resp: 16   Temp: (!) 97 3 °F (36 3 °C)   TempSrc: Temporal   Weight: 77 7 kg (171 lb 3 2 oz)   Height: 4' 10 5" (1 486 m)     Body mass index is 35 17 kg/m²  Physical Exam  Constitutional:       General: She is not in acute distress  HENT:      Head: Normocephalic and atraumatic  Eyes:      General: No scleral icterus  Neck:      Musculoskeletal: Neck supple  Vascular: No JVD  Cardiovascular:      Rate and Rhythm: Normal rate  Heart sounds: S1 normal and S2 normal    Pulmonary:      Effort: Pulmonary effort is normal  No accessory muscle usage or respiratory distress  Breath sounds: No wheezing  Abdominal:      General: There is no distension  Palpations: Abdomen is soft  Musculoskeletal:      Right ankle: She exhibits no swelling  Left ankle: She exhibits no swelling  Comments: Moving all extremities   Skin:     General: Skin is warm  Comments: No jaundice    Neurological:      Mental Status: She is alert  She is not disoriented  Comments: Facial symmetry  Speech is clear   Psychiatric:         Speech: She is communicative  Behavior: Behavior is not combative           LAB RESULTS:  Results for orders placed or performed in visit on 12/30/20   CBC and differential   Result Value Ref Range    WBC 6 58 4 31 - 10 16 Thousand/uL    RBC 4 54 3 81 - 5 12 Million/uL    Hemoglobin 13 3 11 5 - 15 4 g/dL    Hematocrit 39 9 34 8 - 46 1 %    MCV 88 82 - 98 fL    MCH 29 3 26 8 - 34 3 pg    MCHC 33 3 31 4 - 37 4 g/dL    RDW 14 0 11 6 - 15 1 %    MPV 10 2 8 9 - 12 7 fL    Platelets 810 688 - 784 Thousands/uL    nRBC 0 /100 WBCs    Neutrophils Relative 65 43 - 75 %    Immat GRANS % 1 0 - 2 %    Lymphocytes Relative 19 14 - 44 %    Monocytes Relative 9 4 - 12 %    Eosinophils Relative 5 0 - 6 %    Basophils Relative 1 0 - 1 %    Neutrophils Absolute 4 37 1 85 - 7 62 Thousands/µL    Immature Grans Absolute 0 03 0 00 - 0 20 Thousand/uL    Lymphocytes Absolute 1 27 0 60 - 4 47 Thousands/µL    Monocytes Absolute 0 56 0 17 - 1 22 Thousand/µL    Eosinophils Absolute 0 31 0 00 - 0 61 Thousand/µL    Basophils Absolute 0 04 0 00 - 0 10 Thousands/µL   Basic metabolic panel   Result Value Ref Range    Sodium 140 136 - 145 mmol/L    Potassium 3 5 3 5 - 5 3 mmol/L    Chloride 103 100 - 108 mmol/L    CO2 24 21 - 32 mmol/L    ANION GAP 13 4 - 13 mmol/L    BUN 44 (H) 5 - 25 mg/dL    Creatinine 2 14 (H) 0 60 - 1 30 mg/dL    Glucose, Fasting 222 (H) 65 - 99 mg/dL    Calcium 9 9 8 3 - 10 1 mg/dL    eGFR 22 ml/min/1 73sq m   UA (URINE) with reflex to Scope   Result Value Ref Range    Color, UA Yellow     Clarity, UA Clear     Specific Gravity, UA 1 025 1 003 - 1 030    pH, UA 6 0 4 5, 5 0, 5 5, 6 0, 6 5, 7 0, 7 5, 8 0    Leukocytes, UA Small (A) Negative    Nitrite, UA Negative Negative    Protein, UA 30 (1+) (A) Negative mg/dl    Glucose, UA Negative Negative mg/dl    Ketones, UA Negative Negative mg/dl    Urobilinogen, UA 0 2 0 2, 1 0 E U /dl E U /dl    Bilirubin, UA Negative Negative    Blood, UA Negative Negative   Microalbumin / creatinine urine ratio   Result Value Ref Range    Creatinine, Ur 145 0 mg/dL    Microalbum  ,U,Random 256 0 (H) 0 0 - 20 0 mg/L    Microalb Creat Ratio 177 (H) 0 - 30 mg/g creatinine   Phosphorus Result Value Ref Range    Phosphorus 3 5 2 3 - 4 1 mg/dL   Uric acid   Result Value Ref Range    Uric Acid 8 7 (H) 2 0 - 6 8 mg/dL   PTH, intact   Result Value Ref Range    PTH 88 0 (H) 18 4 - 80 1 pg/mL   Vitamin D 25 hydroxy   Result Value Ref Range    Vit D, 25-Hydroxy 25 3 (L) 30 0 - 100 0 ng/mL   Urine Microscopic   Result Value Ref Range    RBC, UA None Seen None Seen, 0-1, 1-2, 2-4, 0-5 /hpf    WBC, UA 4-10 (A) None Seen, 0-1, 1-2, 0-5, 2-4 /hpf    Epithelial Cells Moderate (A) None Seen, Occasional /hpf    Bacteria, UA Moderate (A) None Seen, Occasional /hpf     Renal ultrasound done on 10/29/2019 showed nodular density in floor of urinary bladder suspicious for cancer/malignancy  Atrophic left kidney  Renal artery Doppler done on 1/7/2020 showed > 60% narrowing/stenosis of right main renal artery  Left side was difficult to evaluate  ASSESSMENT and PLAN:  Jessica Porras was seen today for chronic kidney disease and follow-up  Diagnoses and all orders for this visit:    Stage 4 chronic kidney disease ([facility] [address] )  -     CBC and differential; Future  -     Basic metabolic panel; Future  -     UA (URINE) with reflex to Scope; Future  -     Microalbumin / creatinine urine ratio; Future  -     Phosphorus; Future  -     Uric acid; Future  -     PTH, intact; Future  -     Vitamin D 25 hydroxy; Future    Hypertensive kidney disease with stage 4 chronic kidney disease (HCC)  -     Basic metabolic panel; Future  -     UA (URINE) with reflex to Scope; Future  -     Microalbumin / creatinine urine ratio; Future    Other proteinuria  -     UA (URINE) with reflex to Scope; Future  -     Microalbumin / creatinine urine ratio; Future    Hyperuricemia  -     Uric acid; Future    Vitamin D deficiency  -     Vitamin D 25 hydroxy; Future  -     Cholecalciferol 50 MCG (2000 UT) TABS; Take 2 tablets (4,000 Units total) by mouth daily      1  CKD stage 4   Multifactorial and was suspected due to underlying hypertensive nephrosclerosis on top of atrophic left kidney and advanced age  Upon review of old medical records, new baseline creatinine seems to be fluctuating around 2 1-2 2 and in the interim renal function has remained relatively stable with current creatinine of 2 14 with EGFR of 22  Dialysis been discussed in the past and patient has opted against dialysis even though it would be life-saving procedure  Patient was also diagnosed with bladder cancer earlier and defer further management to urologist      Management of hypertension as mentioned below   Plan to avoid NSAIDs and recheck renal function before next visit  2  Hypertension in chronic kidney disease  Today's blood pressure was under well control and plan to monitor hypertension with losartan 100 mg PO daily, labetalol to 300 mg PO BID and will plan to continue amlodipine 2 5 mg PO daily, chlorthalidone 25 mg PO daily and clonidine patch 0 3 mg per week  Patient was also advised to make a log of blood pressure reading for our review with the next visit  Patient had underwent renal artery Doppler on 1/7/2020 and also found to have > 60% stenosis of right main renal artery  Would recommend medical management prior to considering renal artery stenting  3  Proteinuria  Multifactorial, current urine microalbumin to creatinine ratio is 177 mg  Continue losartan 100 mg PO daily  4  Hyperuricemia  Multifactorial, suspected due to underlying chronic kidney disease on top of gout  Now patient is taking allopurinol 100 mg PO daily and in the interim uric acid level has improved to 8 7  Plan to continue allopurinol at current dose and recheck uric acid level with the next visit  5  Vitamin-D deficiency  Currently patient is taking cholecalciferol 2000 Units PO daily with current vitamin-D level of 25 with PTH of 88  Plan to increase cholecalciferol to 4000 Units PO daily  Recheck vitamin-D and PTH level with the next visit    Currently calcium level is at goal     Returns to Nephrology office in 3 months  Future labs ordered  Portions of the record may have been created with voice recognition software  Occasional wrong word or "sound a like" substitutions may have occurred due to the inherent limitations of voice recognition software  Read the chart carefully and recognize, using context, where substitutions have occurred  If you have any questions, please contact the dictating provider

## 2021-01-20 ENCOUNTER — OFFICE VISIT (OUTPATIENT)
Dept: INTERNAL MEDICINE CLINIC | Facility: CLINIC | Age: 76
End: 2021-01-20
Payer: COMMERCIAL

## 2021-01-20 VITALS
RESPIRATION RATE: 16 BRPM | OXYGEN SATURATION: 97 % | HEIGHT: 59 IN | WEIGHT: 172 LBS | DIASTOLIC BLOOD PRESSURE: 70 MMHG | SYSTOLIC BLOOD PRESSURE: 120 MMHG | TEMPERATURE: 96.8 F | BODY MASS INDEX: 34.68 KG/M2 | HEART RATE: 89 BPM

## 2021-01-20 DIAGNOSIS — I10 HYPERTENSION, UNSPECIFIED TYPE: ICD-10-CM

## 2021-01-20 DIAGNOSIS — E78.5 HYPERLIPIDEMIA ASSOCIATED WITH TYPE 2 DIABETES MELLITUS (HCC): ICD-10-CM

## 2021-01-20 DIAGNOSIS — I73.9 CLAUDICATION IN PERIPHERAL VASCULAR DISEASE (HCC): ICD-10-CM

## 2021-01-20 DIAGNOSIS — Z86.73 HISTORY OF CVA (CEREBROVASCULAR ACCIDENT): ICD-10-CM

## 2021-01-20 DIAGNOSIS — I70.0 AORTOILIAC STENOSIS, LEFT (HCC): ICD-10-CM

## 2021-01-20 DIAGNOSIS — R21 RASH: Primary | ICD-10-CM

## 2021-01-20 DIAGNOSIS — I65.1 BASILAR ARTERY STENOSIS: ICD-10-CM

## 2021-01-20 DIAGNOSIS — I15.2 HYPERTENSION ASSOCIATED WITH DIABETES (HCC): ICD-10-CM

## 2021-01-20 DIAGNOSIS — R60.9 DEPENDENT EDEMA: ICD-10-CM

## 2021-01-20 DIAGNOSIS — F33.9 DEPRESSION, RECURRENT (HCC): ICD-10-CM

## 2021-01-20 DIAGNOSIS — E66.01 OBESITY, MORBID (HCC): ICD-10-CM

## 2021-01-20 DIAGNOSIS — E11.59 HYPERTENSION ASSOCIATED WITH DIABETES (HCC): ICD-10-CM

## 2021-01-20 DIAGNOSIS — C67.8 MALIGNANT NEOPLASM OF OVERLAPPING SITES OF BLADDER (HCC): ICD-10-CM

## 2021-01-20 DIAGNOSIS — E11.69 HYPERLIPIDEMIA ASSOCIATED WITH TYPE 2 DIABETES MELLITUS (HCC): ICD-10-CM

## 2021-01-20 DIAGNOSIS — I74.3 EMBOLISM AND THROMBOSIS OF ARTERIES OF THE LOWER EXTREMITIES (HCC): ICD-10-CM

## 2021-01-20 PROCEDURE — 3074F SYST BP LT 130 MM HG: CPT | Performed by: NURSE PRACTITIONER

## 2021-01-20 PROCEDURE — 3078F DIAST BP <80 MM HG: CPT | Performed by: NURSE PRACTITIONER

## 2021-01-20 PROCEDURE — 1036F TOBACCO NON-USER: CPT | Performed by: NURSE PRACTITIONER

## 2021-01-20 PROCEDURE — 1160F RVW MEDS BY RX/DR IN RCRD: CPT | Performed by: NURSE PRACTITIONER

## 2021-01-20 PROCEDURE — 3008F BODY MASS INDEX DOCD: CPT | Performed by: NURSE PRACTITIONER

## 2021-01-20 PROCEDURE — 4010F ACE/ARB THERAPY RXD/TAKEN: CPT | Performed by: NURSE PRACTITIONER

## 2021-01-20 PROCEDURE — 99213 OFFICE O/P EST LOW 20 MIN: CPT | Performed by: NURSE PRACTITIONER

## 2021-01-20 RX ORDER — CHLORTHALIDONE 25 MG/1
25 TABLET ORAL DAILY
Qty: 90 TABLET | Refills: 2 | Status: SHIPPED | OUTPATIENT
Start: 2021-01-20 | End: 2021-11-18 | Stop reason: SDUPTHER

## 2021-01-20 RX ORDER — PREDNISONE 10 MG/1
TABLET ORAL
Qty: 30 TABLET | Refills: 0 | Status: SHIPPED | OUTPATIENT
Start: 2021-01-20 | End: 2021-08-13 | Stop reason: ALTCHOICE

## 2021-01-20 RX ORDER — CILOSTAZOL 100 MG/1
100 TABLET ORAL 2 TIMES DAILY
Qty: 60 TABLET | Refills: 5 | Status: SHIPPED | OUTPATIENT
Start: 2021-01-20 | End: 2021-08-10

## 2021-01-20 RX ORDER — AMLODIPINE BESYLATE 2.5 MG/1
2.5 TABLET ORAL DAILY
Qty: 90 TABLET | Refills: 2 | Status: SHIPPED | OUTPATIENT
Start: 2021-01-20 | End: 2022-02-08

## 2021-01-20 RX ORDER — OMEGA-3-ACID ETHYL ESTERS 1 G/1
2 CAPSULE, LIQUID FILLED ORAL 2 TIMES DAILY
Qty: 360 CAPSULE | Refills: 2 | Status: SHIPPED | OUTPATIENT
Start: 2021-01-20 | End: 2021-02-08 | Stop reason: CLARIF

## 2021-01-20 RX ORDER — CLOPIDOGREL BISULFATE 75 MG/1
75 TABLET ORAL DAILY
Qty: 90 TABLET | Refills: 1 | Status: SHIPPED | OUTPATIENT
Start: 2021-01-20 | End: 2021-07-12

## 2021-01-20 RX ORDER — CLOPIDOGREL BISULFATE 75 MG/1
TABLET ORAL
Qty: 90 TABLET | Refills: 1 | OUTPATIENT
Start: 2021-01-20

## 2021-01-20 RX ORDER — CLONIDINE 0.3 MG/24H
1 PATCH, EXTENDED RELEASE TRANSDERMAL WEEKLY
Qty: 12 PATCH | Refills: 2 | Status: SHIPPED | OUTPATIENT
Start: 2021-01-20 | End: 2021-12-06

## 2021-01-20 RX ORDER — LOSARTAN POTASSIUM 100 MG/1
TABLET ORAL
Qty: 135 TABLET | Refills: 1 | Status: SHIPPED | OUTPATIENT
Start: 2021-01-20 | End: 2021-08-02

## 2021-01-20 NOTE — PATIENT INSTRUCTIONS
Rash- Start tapering doses of Prednisone and to apply Hydrocortisone cream in trouble areas  You can take Benadryl at bedtime  Please call if not better  Please have your blood work completed and we will call you with results  Follow up in three months

## 2021-01-20 NOTE — PROGRESS NOTES
BMI Counseling: Body mass index is 35 34 kg/m²  The BMI is above normal  Nutrition recommendations include decreasing portion sizes, encouraging healthy choices of fruits and vegetables, decreasing fast food intake, consuming healthier snacks, limiting drinks that contain sugar, moderation in carbohydrate intake, increasing intake of lean protein, reducing intake of saturated and trans fat and reducing intake of cholesterol  Exercise recommendations include exercising 3-5 times per week  No pharmacotherapy was ordered  Patient referred to PCP due to patient being overweight  Assessment/Plan:    Rash- Start tapering doses of Prednisone and to apply Hydrocortisone cream in trouble areas  You can take Benadryl at bedtime  Please call if not better  Please have your blood work completed and we will call you with results  Follow up in three months  Diagnoses and all orders for this visit:    Rash  -     predniSONE 10 mg tablet; Take 4 tabs po QD x 3 days then 3 tabs po QD x 3 days then 2 tabs po QD x 3 days then 1 tab po QD x 3 days then stop  -     hydrocortisone 2 5 % cream; Apply topically 2 (two) times a day  -     amLODIPine (NORVASC) 2 5 mg tablet; Take 1 tablet (2 5 mg total) by mouth daily  -     chlorthalidone 25 mg tablet; Take 1 tablet (25 mg total) by mouth daily  -     cilostazol (PLETAL) 100 mg tablet; Take 1 tablet (100 mg total) by mouth 2 (two) times a day  -     cloNIDine (CATAPRES-TTS-3) 0 3 mg/24 hr; Place 1 patch (0 3 mg total) on the skin once a week  -     clopidogrel (PLAVIX) 75 mg tablet; Take 1 tablet (75 mg total) by mouth daily  -     losartan (COZAAR) 100 MG tablet; Take 1 5 tabs daily  -     omega-3-acid ethyl esters (LOVAZA) 1 g capsule; Take 2 capsules (2 g total) by mouth 2 (two) times a day  -     CBC and differential; Future  -     Comprehensive metabolic panel; Future  -     HEMOGLOBIN A1C W/ EAG ESTIMATION; Future  -     Lipid panel;  Future  -     TSH, 3rd generation with Free T4 reflex; Future    Hypertension associated with diabetes ([address] )  -     predniSONE 10 mg tablet; Take 4 tabs po QD x 3 days then 3 tabs po QD x 3 days then 2 tabs po QD x 3 days then 1 tab po QD x 3 days then stop  -     hydrocortisone 2 5 % cream; Apply topically 2 (two) times a day  -     amLODIPine (NORVASC) 2 5 mg tablet; Take 1 tablet (2 5 mg total) by mouth daily  -     chlorthalidone 25 mg tablet; Take 1 tablet (25 mg total) by mouth daily  -     cilostazol (PLETAL) 100 mg tablet; Take 1 tablet (100 mg total) by mouth 2 (two) times a day  -     cloNIDine (CATAPRES-TTS-3) 0 3 mg/24 hr; Place 1 patch (0 3 mg total) on the skin once a week  -     clopidogrel (PLAVIX) 75 mg tablet; Take 1 tablet (75 mg total) by mouth daily  -     losartan (COZAAR) 100 MG tablet; Take 1 5 tabs daily  -     omega-3-acid ethyl esters (LOVAZA) 1 g capsule; Take 2 capsules (2 g total) by mouth 2 (two) times a day  -     CBC and differential; Future  -     Comprehensive metabolic panel; Future  -     HEMOGLOBIN A1C W/ EAG ESTIMATION; Future  -     Lipid panel; Future  -     TSH, 3rd generation with Free T4 reflex; Future    Hypertension, unspecified type  -     predniSONE 10 mg tablet; Take 4 tabs po QD x 3 days then 3 tabs po QD x 3 days then 2 tabs po QD x 3 days then 1 tab po QD x 3 days then stop  -     hydrocortisone 2 5 % cream; Apply topically 2 (two) times a day  -     amLODIPine (NORVASC) 2 5 mg tablet; Take 1 tablet (2 5 mg total) by mouth daily  -     chlorthalidone 25 mg tablet; Take 1 tablet (25 mg total) by mouth daily  -     cilostazol (PLETAL) 100 mg tablet; Take 1 tablet (100 mg total) by mouth 2 (two) times a day  -     cloNIDine (CATAPRES-TTS-3) 0 3 mg/24 hr; Place 1 patch (0 3 mg total) on the skin once a week  -     clopidogrel (PLAVIX) 75 mg tablet; Take 1 tablet (75 mg total) by mouth daily  -     losartan (COZAAR) 100 MG tablet;  Take 1 5 tabs daily  -     omega-3-acid ethyl esters (Gem Isaac) 1 g capsule; Take 2 capsules (2 g total) by mouth 2 (two) times a day  -     CBC and differential; Future  -     Comprehensive metabolic panel; Future  -     HEMOGLOBIN A1C W/ EAG ESTIMATION; Future  -     Lipid panel; Future  -     TSH, 3rd generation with Free T4 reflex; Future    Dependent edema  -     predniSONE 10 mg tablet; Take 4 tabs po QD x 3 days then 3 tabs po QD x 3 days then 2 tabs po QD x 3 days then 1 tab po QD x 3 days then stop  -     hydrocortisone 2 5 % cream; Apply topically 2 (two) times a day  -     amLODIPine (NORVASC) 2 5 mg tablet; Take 1 tablet (2 5 mg total) by mouth daily  -     chlorthalidone 25 mg tablet; Take 1 tablet (25 mg total) by mouth daily  -     cilostazol (PLETAL) 100 mg tablet; Take 1 tablet (100 mg total) by mouth 2 (two) times a day  -     cloNIDine (CATAPRES-TTS-3) 0 3 mg/24 hr; Place 1 patch (0 3 mg total) on the skin once a week  -     clopidogrel (PLAVIX) 75 mg tablet; Take 1 tablet (75 mg total) by mouth daily  -     losartan (COZAAR) 100 MG tablet; Take 1 5 tabs daily  -     omega-3-acid ethyl esters (LOVAZA) 1 g capsule; Take 2 capsules (2 g total) by mouth 2 (two) times a day  -     CBC and differential; Future  -     Comprehensive metabolic panel; Future  -     HEMOGLOBIN A1C W/ EAG ESTIMATION; Future  -     Lipid panel; Future  -     TSH, 3rd generation with Free T4 reflex; Future    Aortoiliac stenosis, left (HCC)  -     predniSONE 10 mg tablet; Take 4 tabs po QD x 3 days then 3 tabs po QD x 3 days then 2 tabs po QD x 3 days then 1 tab po QD x 3 days then stop  -     hydrocortisone 2 5 % cream; Apply topically 2 (two) times a day  -     amLODIPine (NORVASC) 2 5 mg tablet; Take 1 tablet (2 5 mg total) by mouth daily  -     chlorthalidone 25 mg tablet; Take 1 tablet (25 mg total) by mouth daily  -     cilostazol (PLETAL) 100 mg tablet;  Take 1 tablet (100 mg total) by mouth 2 (two) times a day  -     cloNIDine (CATAPRES-TTS-3) 0 3 mg/24 hr; Place 1 patch (0 3 mg total) on the skin once a week  -     clopidogrel (PLAVIX) 75 mg tablet; Take 1 tablet (75 mg total) by mouth daily  -     losartan (COZAAR) 100 MG tablet; Take 1 5 tabs daily  -     omega-3-acid ethyl esters (LOVAZA) 1 g capsule; Take 2 capsules (2 g total) by mouth 2 (two) times a day  -     CBC and differential; Future  -     Comprehensive metabolic panel; Future  -     HEMOGLOBIN A1C W/ EAG ESTIMATION; Future  -     Lipid panel; Future  -     TSH, 3rd generation with Free T4 reflex; Future    History of CVA (cerebrovascular accident)  -     predniSONE 10 mg tablet; Take 4 tabs po QD x 3 days then 3 tabs po QD x 3 days then 2 tabs po QD x 3 days then 1 tab po QD x 3 days then stop  -     hydrocortisone 2 5 % cream; Apply topically 2 (two) times a day  -     amLODIPine (NORVASC) 2 5 mg tablet; Take 1 tablet (2 5 mg total) by mouth daily  -     chlorthalidone 25 mg tablet; Take 1 tablet (25 mg total) by mouth daily  -     cilostazol (PLETAL) 100 mg tablet; Take 1 tablet (100 mg total) by mouth 2 (two) times a day  -     cloNIDine (CATAPRES-TTS-3) 0 3 mg/24 hr; Place 1 patch (0 3 mg total) on the skin once a week  -     clopidogrel (PLAVIX) 75 mg tablet; Take 1 tablet (75 mg total) by mouth daily  -     losartan (COZAAR) 100 MG tablet; Take 1 5 tabs daily  -     omega-3-acid ethyl esters (LOVAZA) 1 g capsule; Take 2 capsules (2 g total) by mouth 2 (two) times a day  -     CBC and differential; Future  -     Comprehensive metabolic panel; Future  -     HEMOGLOBIN A1C W/ EAG ESTIMATION; Future  -     Lipid panel; Future  -     TSH, 3rd generation with Free T4 reflex; Future    Claudication in peripheral vascular disease (HCC)  -     predniSONE 10 mg tablet; Take 4 tabs po QD x 3 days then 3 tabs po QD x 3 days then 2 tabs po QD x 3 days then 1 tab po QD x 3 days then stop  -     hydrocortisone 2 5 % cream; Apply topically 2 (two) times a day  -     amLODIPine (NORVASC) 2 5 mg tablet;  Take 1 tablet (2 5 mg total) by mouth daily  -     chlorthalidone 25 mg tablet; Take 1 tablet (25 mg total) by mouth daily  -     cilostazol (PLETAL) 100 mg tablet; Take 1 tablet (100 mg total) by mouth 2 (two) times a day  -     cloNIDine (CATAPRES-TTS-3) 0 3 mg/24 hr; Place 1 patch (0 3 mg total) on the skin once a week  -     clopidogrel (PLAVIX) 75 mg tablet; Take 1 tablet (75 mg total) by mouth daily  -     losartan (COZAAR) 100 MG tablet; Take 1 5 tabs daily  -     omega-3-acid ethyl esters (LOVAZA) 1 g capsule; Take 2 capsules (2 g total) by mouth 2 (two) times a day  -     CBC and differential; Future  -     Comprehensive metabolic panel; Future  -     HEMOGLOBIN A1C W/ EAG ESTIMATION; Future  -     Lipid panel; Future  -     TSH, 3rd generation with Free T4 reflex; Future    Basilar artery stenosis  -     predniSONE 10 mg tablet; Take 4 tabs po QD x 3 days then 3 tabs po QD x 3 days then 2 tabs po QD x 3 days then 1 tab po QD x 3 days then stop  -     hydrocortisone 2 5 % cream; Apply topically 2 (two) times a day  -     amLODIPine (NORVASC) 2 5 mg tablet; Take 1 tablet (2 5 mg total) by mouth daily  -     chlorthalidone 25 mg tablet; Take 1 tablet (25 mg total) by mouth daily  -     cilostazol (PLETAL) 100 mg tablet; Take 1 tablet (100 mg total) by mouth 2 (two) times a day  -     cloNIDine (CATAPRES-TTS-3) 0 3 mg/24 hr; Place 1 patch (0 3 mg total) on the skin once a week  -     clopidogrel (PLAVIX) 75 mg tablet; Take 1 tablet (75 mg total) by mouth daily  -     losartan (COZAAR) 100 MG tablet; Take 1 5 tabs daily  -     omega-3-acid ethyl esters (LOVAZA) 1 g capsule; Take 2 capsules (2 g total) by mouth 2 (two) times a day  -     CBC and differential; Future  -     Comprehensive metabolic panel; Future  -     HEMOGLOBIN A1C W/ EAG ESTIMATION; Future  -     Lipid panel; Future  -     TSH, 3rd generation with Free T4 reflex; Future    Hyperlipidemia associated with type 2 diabetes mellitus (HCC)  -     predniSONE 10 mg tablet;  Take 4 tabs po QD x 3 days then 3 tabs po QD x 3 days then 2 tabs po QD x 3 days then 1 tab po QD x 3 days then stop  -     hydrocortisone 2 5 % cream; Apply topically 2 (two) times a day  -     amLODIPine (NORVASC) 2 5 mg tablet; Take 1 tablet (2 5 mg total) by mouth daily  -     chlorthalidone 25 mg tablet; Take 1 tablet (25 mg total) by mouth daily  -     cilostazol (PLETAL) 100 mg tablet; Take 1 tablet (100 mg total) by mouth 2 (two) times a day  -     cloNIDine (CATAPRES-TTS-3) 0 3 mg/24 hr; Place 1 patch (0 3 mg total) on the skin once a week  -     clopidogrel (PLAVIX) 75 mg tablet; Take 1 tablet (75 mg total) by mouth daily  -     losartan (COZAAR) 100 MG tablet; Take 1 5 tabs daily  -     omega-3-acid ethyl esters (LOVAZA) 1 g capsule; Take 2 capsules (2 g total) by mouth 2 (two) times a day  -     CBC and differential; Future  -     Comprehensive metabolic panel; Future  -     HEMOGLOBIN A1C W/ EAG ESTIMATION; Future  -     Lipid panel; Future  -     TSH, 3rd generation with Free T4 reflex; Future    Embolism and 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)    Depression, recurrent ([facility] [address] )    Malignant neoplasm of overlapping sites of bladder ([facility] [address] )    Obesity, morbid ([facility] [address] )        The patient was counseled regarding instructions for management, risk factor reductions, patient and family education,impressions, risks and benefits of treatment options, side effects of medications, importance of compliance with treatment  The treatment plan was reviewed with the patient/guardian and patient/guardian understands and agrees with the treatment plan          Current Outpatient Medications:     allopurinol (ZYLOPRIM) 100 mg tablet, Take 1 tablet (100 mg total) by mouth daily, Disp: 90 tablet, Rfl: 2    amLODIPine (NORVASC) 2 5 mg tablet, Take 1 tablet (2 5 mg total) by mouth daily, Disp: 90 tablet, Rfl: 2    chlorthalidone 25 mg tablet, Take 1 tablet (25 mg total) by mouth daily, Disp: 90 tablet, Rfl: 2   Cholecalciferol 50 MCG (2000 UT) TABS, Take 2 tablets (4,000 Units total) by mouth daily, Disp: , Rfl:     cilostazol (PLETAL) 100 mg tablet, Take 1 tablet (100 mg total) by mouth 2 (two) times a day, Disp: 60 tablet, Rfl: 5    cloNIDine (CATAPRES-TTS-3) 0 3 mg/24 hr, Place 1 patch (0 3 mg total) on the skin once a week, Disp: 12 patch, Rfl: 2    clopidogrel (PLAVIX) 75 mg tablet, Take 1 tablet (75 mg total) by mouth daily, Disp: 90 tablet, Rfl: 1    losartan (COZAAR) 100 MG tablet, Take 1 5 tabs daily, Disp: 135 tablet, Rfl: 1    hydrocortisone 2 5 % cream, Apply topically 2 (two) times a day, Disp: 30 g, Rfl: 1    labetalol (NORMODYNE) 300 mg tablet, Take 1 tablet (300 mg total) by mouth 2 (two) times a day, Disp: 270 tablet, Rfl: 2    omega-3-acid ethyl esters (LOVAZA) 1 g capsule, Take 2 capsules (2 g total) by mouth 2 (two) times a day, Disp: 360 capsule, Rfl: 2    predniSONE 10 mg tablet, Take 4 tabs po QD x 3 days then 3 tabs po QD x 3 days then 2 tabs po QD x 3 days then 1 tab po QD x 3 days then stop, Disp: 30 tablet, Rfl: 0    Subjective:      Patient ID: Phoenix Vicente is a 70 y o  female  Here today with rash on low back, thighs, forearms, abdomen for the past three weeks  The following portions of the patient's history were reviewed and updated as appropriate:   She has a past medical history of Arthritis, Chronic kidney disease, Coronary artery disease, Diabetes mellitus ([address] ), Endometriosis, High cholesterol, Hypertension, Kidney disease, chronic, stage III (moderate, EGFR 30-59 ml/min), Lumbar disc herniation, Neuropathy, Peripheral vascular disease ([address] ), Pneumonia, Shoulder injury, Spinal stenosis, and Stroke ([address] ) (2015)  ,  does not have any pertinent problems on file  ,   has a past surgical history that includes Rotator cuff repair (Left); Tonsillectomy; Ovarian cyst surgery; Fracture surgery (Right);  Colonoscopy; Cardiac catheterization; [place] retrograde pyelogram (4/6/2020); pr cystourethroscopy,fulgur 0 5-2 cm lesn (N/A, 4/6/2020); pr cystourethroscopy,ureter catheter (Bilateral, 4/6/2020); and pr cystoscopy,insert ureteral stent (Right, 4/6/2020)  ,  family history includes Arthritis in her brother; Heart defect in her father; Heart disease in her mother; Hyperlipidemia in her mother; Hypertension in her father and mother; Other in her brother; Stroke in her sister  ,   reports that she quit smoking about 5 months ago  Her smoking use included cigarettes  She smoked 0 00 packs per day  She has never used smokeless tobacco  She reports that she does not drink alcohol or use drugs  ,  is allergic to fenofibrate; colesevelam; colestipol; ezetimibe; pollen extract; and statins       Review of Systems   Constitutional: Negative  Respiratory: Negative  Cardiovascular: Negative  Musculoskeletal: Negative  Skin: Positive for rash  Psychiatric/Behavioral: Negative            Objective:  /70 (BP Location: Left arm)   Pulse 89   Temp (!) 96 8 °F (36 °C)   Resp 16   Ht 4' 10 5" (1 486 m)   Wt 78 kg (172 lb)   LMP  (LMP Unknown)   SpO2 97%   BMI 35 34 kg/m²     Lab Review  Lab on 12/30/2020   Component Date Value    WBC 12/30/2020 6 58     RBC 12/30/2020 4 54     Hemoglobin 12/30/2020 13 3     Hematocrit 12/30/2020 39 9     MCV 12/30/2020 88     MCH 12/30/2020 29 3     MCHC 12/30/2020 33 3     RDW 12/30/2020 14 0     MPV 12/30/2020 10 2     Platelets 98/70/3283 219     nRBC 12/30/2020 0     Neutrophils Relative 12/30/2020 65     Immat GRANS % 12/30/2020 1     Lymphocytes Relative 12/30/2020 19     Monocytes Relative 12/30/2020 9     Eosinophils Relative 12/30/2020 5     Basophils Relative 12/30/2020 1     Neutrophils Absolute 12/30/2020 4 37     Immature Grans Absolute 12/30/2020 0 03     Lymphocytes Absolute 12/30/2020 1 27     Monocytes Absolute 12/30/2020 0 56     Eosinophils Absolute 12/30/2020 0 31     Basophils Absolute 12/30/2020 0 04     Sodium 12/30/2020 140     Potassium 12/30/2020 3 5     Chloride 12/30/2020 103     CO2 12/30/2020 24     ANION GAP 12/30/2020 13     BUN 12/30/2020 44*    Creatinine 12/30/2020 2 14*    Glucose, Fasting 12/30/2020 222*    Calcium 12/30/2020 9 9     eGFR 12/30/2020 22     Color, UA 12/31/2020 Yellow     Clarity, UA 12/31/2020 Clear     Specific Gravity, UA 12/31/2020 1 025     pH, UA 12/31/2020 6 0     Leukocytes, UA 12/31/2020 Small*    Nitrite, UA 12/31/2020 Negative     Protein, UA 12/31/2020 30 (1+)*    Glucose, UA 12/31/2020 Negative     Ketones, UA 12/31/2020 Negative     Urobilinogen, UA 12/31/2020 0 2     Bilirubin, UA 12/31/2020 Negative     Blood, UA 12/31/2020 Negative     Creatinine, Ur 12/31/2020 145 0     Microalbum  ,U,Random 12/31/2020 256 0*    Microalb Creat Ratio 12/31/2020 177*    Phosphorus 12/30/2020 3 5     Uric Acid 12/30/2020 8 7*    PTH 12/30/2020 88 0*    Vit D, 25-Hydroxy 12/30/2020 25 3*    RBC, UA 12/31/2020 None Seen     WBC, UA 12/31/2020 4-10*    Epithelial Cells 12/31/2020 Moderate*    Bacteria, UA 12/31/2020 Moderate*        Imaging: No results found  Physical Exam  Constitutional:       Appearance: She is well-developed  Cardiovascular:      Rate and Rhythm: Normal rate and regular rhythm  Heart sounds: Normal heart sounds  Pulmonary:      Effort: Pulmonary effort is normal       Breath sounds: Normal breath sounds  Musculoskeletal: Normal range of motion  Skin:     Findings: Bruising, erythema and rash present  Comments: (+) maculopapular rash on B/ LE, forearms, abdomen and lower back  Few open areas from scratching  B/L thighs are ecchymotic from scratching  Neurological:      Mental Status: She is alert and oriented to person, place, and time  Deep Tendon Reflexes: Reflexes are normal and symmetric  Psychiatric:         Behavior: Behavior normal          Thought Content:  Thought content normal          Judgment: Judgment normal

## 2021-02-05 ENCOUNTER — TELEPHONE (OUTPATIENT)
Dept: INTERNAL MEDICINE CLINIC | Facility: CLINIC | Age: 76
End: 2021-02-05

## 2021-02-08 DIAGNOSIS — E11.69 HYPERLIPIDEMIA ASSOCIATED WITH TYPE 2 DIABETES MELLITUS (HCC): Primary | ICD-10-CM

## 2021-02-08 DIAGNOSIS — E78.5 HYPERLIPIDEMIA ASSOCIATED WITH TYPE 2 DIABETES MELLITUS (HCC): Primary | ICD-10-CM

## 2021-02-08 RX ORDER — ICOSAPENT ETHYL 1000 MG/1
CAPSULE ORAL
Qty: 360 CAPSULE | Refills: 2 | Status: SHIPPED | OUTPATIENT
Start: 2021-02-08 | End: 2021-11-09

## 2021-02-08 NOTE — TELEPHONE ENCOUNTER
She stated her insurance company wouldn't cover the [place] but now they will  She wanted to know if this could be [facility] for [facility]

## 2021-03-03 ENCOUNTER — HOSPITAL ENCOUNTER (OUTPATIENT)
Dept: VASCULAR ULTRASOUND | Facility: HOSPITAL | Age: 76
Discharge: HOME | End: 2021-03-03
Payer: COMMERCIAL

## 2021-03-03 DIAGNOSIS — I70.201 FEMORAL ARTERY STENOSIS, RIGHT (HCC): ICD-10-CM

## 2021-03-03 DIAGNOSIS — I77.1 CELIAC ARTERY STENOSIS (HCC): ICD-10-CM

## 2021-03-03 DIAGNOSIS — I73.9 CLAUDICATION IN PERIPHERAL VASCULAR DISEASE (HCC): ICD-10-CM

## 2021-03-03 DIAGNOSIS — E11.21 TYPE 2 DIABETES MELLITUS WITH DIABETIC NEPHROPATHY, WITHOUT LONG-TERM CURRENT USE OF INSULIN (HCC): ICD-10-CM

## 2021-03-03 DIAGNOSIS — K55.1 MESENTERIC ARTERY STENOSIS (HCC): ICD-10-CM

## 2021-03-03 DIAGNOSIS — I70.213 ATHEROSCLEROSIS OF NATIVE ARTERIES OF EXTREMITIES WITH INTERMITTENT CLAUDICATION, BILATERAL LEGS (HCC): ICD-10-CM

## 2021-03-03 DIAGNOSIS — I70.1 ATHEROSCLEROSIS OF RENAL ARTERY (HCC): ICD-10-CM

## 2021-03-03 DIAGNOSIS — I24.0 RIGHT CORONARY ARTERY OCCLUSION (HCC): ICD-10-CM

## 2021-03-03 DIAGNOSIS — I65.23 ASYMPTOMATIC BILATERAL CAROTID ARTERY STENOSIS: ICD-10-CM

## 2021-03-03 PROCEDURE — 93978 VASCULAR STUDY: CPT

## 2021-03-03 PROCEDURE — 93880 EXTRACRANIAL BILAT STUDY: CPT

## 2021-03-03 PROCEDURE — 93975 VASCULAR STUDY: CPT

## 2021-03-03 PROCEDURE — 93923 UPR/LXTR ART STDY 3+ LVLS: CPT

## 2021-03-03 PROCEDURE — 3066F NEPHROPATHY DOC TX: CPT | Performed by: SURGERY

## 2021-03-04 PROCEDURE — 93880 EXTRACRANIAL BILAT STUDY: CPT | Performed by: SURGERY

## 2021-03-04 PROCEDURE — 93978 VASCULAR STUDY: CPT | Performed by: SURGERY

## 2021-03-04 PROCEDURE — 93975 VASCULAR STUDY: CPT | Performed by: SURGERY

## 2021-03-15 ENCOUNTER — TELEPHONE (OUTPATIENT)
Dept: ADMINISTRATIVE | Facility: HOSPITAL | Age: 76
End: 2021-03-15

## 2021-03-15 ENCOUNTER — TELEMEDICINE (OUTPATIENT)
Dept: VASCULAR SURGERY | Facility: CLINIC | Age: 76
End: 2021-03-15
Payer: COMMERCIAL

## 2021-03-15 VITALS
HEIGHT: 59 IN | DIASTOLIC BLOOD PRESSURE: 72 MMHG | SYSTOLIC BLOOD PRESSURE: 136 MMHG | HEART RATE: 80 BPM | BODY MASS INDEX: 35.88 KG/M2 | WEIGHT: 178 LBS

## 2021-03-15 DIAGNOSIS — I70.213 ATHEROSCLEROSIS OF NATIVE ARTERIES OF EXTREMITIES WITH INTERMITTENT CLAUDICATION, BILATERAL LEGS (HCC): Primary | ICD-10-CM

## 2021-03-15 DIAGNOSIS — I70.201 FEMORAL ARTERY STENOSIS, RIGHT (HCC): ICD-10-CM

## 2021-03-15 PROCEDURE — 1036F TOBACCO NON-USER: CPT | Performed by: SURGERY

## 2021-03-15 PROCEDURE — 1160F RVW MEDS BY RX/DR IN RCRD: CPT | Performed by: SURGERY

## 2021-03-15 PROCEDURE — 3078F DIAST BP <80 MM HG: CPT | Performed by: SURGERY

## 2021-03-15 PROCEDURE — 3008F BODY MASS INDEX DOCD: CPT | Performed by: SURGERY

## 2021-03-15 PROCEDURE — 99215 OFFICE O/P EST HI 40 MIN: CPT | Performed by: SURGERY

## 2021-03-15 PROCEDURE — 3075F SYST BP GE 130 - 139MM HG: CPT | Performed by: SURGERY

## 2021-03-15 NOTE — ASSESSMENT & PLAN NOTE
81yo Female with CKD4, HTN, HLD   Presents after repeat Ultrasound studies  LEAD-  R CFA 50-75% stenosis, SFA disease, R LISA 0 51 (0 66),  L prox SFA occlusion, patent AT/peroneal vessels, L LISA 0 50 (0 61)     -Experiencing disabling claudication at approximately 50 feet L>R  Has been worsening since three months ago  Mainly in the hip/thigh/ Also has underlying spinal stenosis and chronic back pain  Given drop in LISA and symptoms consistent with disabling PAD will proceed with angiography  AOIL was limited given body habitus and limited visualizing of iliacs  Considered work-up with CTA however given ckd4 and previous worsening of renal functioning with contrasted study will proceed directly to angiography  Will obtain nephrology reccs for periprocedural hydration  Had an extensive risks/benefits alternatives discussion  Including risk of bleeding, clotting, worsening blood flow, worsening renal function  and the patient consented to proceed    I have spent 30 minutes with Patient  today in which greater than 50% of this time was spent in counseling/coordination of care regarding Diagnostic results, Prognosis, Risks and benefits of tx options, Intructions for management, Patient and family education, Importance of tx compliance, Risk factor reductions and Impressions

## 2021-03-15 NOTE — TELEPHONE ENCOUNTER
Check out staff: Tra Cordero: Under Reason For Call, comments MUST be formatted as:   (Surgeon's Initials) / (Procedure)    PHYSICIAN / HOSPITAL: [facility] (NPI: [id number]) / [facility] (Tax: [id number] / NPI: [id number])    PROCEDURE: AORTAGRAM WITH RUN-OFF    LEVEL: 2    REP: No    ASSISTANT SURGEON: No    ALLERGIES: Fenofibrate, Colesevelam, Colestipol, Ezetimibe, Pollen extract, and Statins    INSTRUCTIONS GIVEN: AGRAM INSTRUCTIONS    BLOOD THINNERS / MED HOLD: Do not hold Rx - Plavix (Clopidogrel)   HOLD PLETAL 3 DAYS    HYDRATION REQUIRED: Yes, PER NEPHROLOGY-DR ZHU  DIALYSIS: Patient is not on dialysis  *Please ROUTE this encounter to [facility] Surgery Coordinator   AND   Send COMPLETE CONSENT to [facility] Surgery Schedulers e-mail group*    Consent will be signed day of procedure  *Please ROUTE this encounter to [facility] Clearance Pool   AND   Send CLEARANCE FORM(S) to [facility] Nursing e-mail group*    Patient requires Nephrology clearance  Spoke withANN, at [address]  Patient's appointment with DR Candido Grubbs  has been scheduled for   at   [facility] Office contact information P: [phone] - F: [phone]     PATIENT WAS SEEN IN January  ANN WILL SEE IF PATIENT CAN BE CLEARED OFF OF THAT VISIT

## 2021-03-15 NOTE — PROGRESS NOTES
Virtual Regular Visit      Assessment/Plan:    Problem List Items Addressed This Visit        Cardiovascular and Mediastinum    Femoral artery stenosis, right (HCC) 50-75% stenosis in the common femoral artery  Atherosclerosis of native arteries of extremities with intermittent claudication, bilateral legs ([address] ) - Primary     79yo Female with CKD4, HTN, HLD   Presents after repeat Ultrasound studies  LEAD-  R CFA 50-75% stenosis, SFA disease, R LISA 0 51 (0 66),  L prox SFA occlusion, patent AT/peroneal vessels, L LISA 0 50 (0 61)     -Experiencing disabling claudication at approximately 50 feet L>R  Has been worsening since three months ago  Mainly in the hip/thigh/ Also has underlying spinal stenosis and chronic back pain  Given drop in LISA and symptoms consistent with disabling PAD will proceed with angiography  AOIL was limited given body habitus and limited visualizing of iliacs  Considered work-up with CTA however given ckd4 and previous worsening of renal functioning with contrasted study will proceed directly to angiography  Will obtain nephrology reccs for periprocedural hydration  Had an extensive risks/benefits alternatives discussion  Including risk of bleeding, clotting, worsening blood flow, worsening renal function  and the patient consented to proceed    I have spent 30 minutes with Patient  today in which greater than 50% of this time was spent in counseling/coordination of care regarding Diagnostic results, Prognosis, Risks and benefits of tx options, Intructions for management, Patient and family education, Importance of tx compliance, Risk factor reductions and Impressions                   Relevant Orders    IR aortagram with run-off    Ambulatory referral to Nephrology               Reason for visit is   Chief Complaint   Patient presents with    Virtual Regular Visit    Virtual Regular Visit        Encounter provider Trish Hanley MD    Provider located at [address] [place] [address] [address] [place] [address] [address]  [address]  [facility] [address]  [phone]      Recent Visits  No visits were found meeting these conditions  Showing recent visits within past 7 days and meeting all other requirements     Today's Visits  Date Type Provider Dept   03/15/21 Telemedicine Massimo Laboy MD [facility] [facility] today's visits and meeting all other requirements     Future Appointments  No visits were found meeting these conditions  Showing future appointments within next 150 days and meeting all other requirements        The patient was identified by name and date of birth  Maxi Lui was informed that this is a telemedicine visit and that the visit is being conducted through [employer] and patient was informed that this is a secure, HIPAA-compliant platform  She agrees to proceed     My office door was closed  No one else was in the room  She acknowledged consent and understanding of privacy and security of the video platform  The patient has agreed to participate and understands they can discontinue the visit at any time  Patient is aware this is a billable service       Subjective  Maxi Lui is a 70 y o  female with multiple comorbidity and worsening symptomatic PAD        Past Medical History:   Diagnosis Date    Arthritis     Chronic kidney disease     Coronary artery disease     Diabetes mellitus ([address] )     Patient states she is not Diabetic    Endometriosis     High cholesterol     Hypertension     Kidney disease, chronic, stage III (moderate, EGFR 30-59 ml/min)     Lumbar disc herniation     Neuropathy     Peripheral vascular disease ([address] )     Pneumonia     Shoulder injury     left    Spinal stenosis     Stroke ([address] ) 2015    Memory loss       Past Surgical History:   Procedure Laterality Date    CARDIAC CATHETERIZATION      COLONOSCOPY      FL RETROGRADE PYELOGRAM  4/6/2020    FRACTURE SURGERY Right     ankle    OVARIAN CYST SURGERY      AK CYSTOSCOPY,INSERT URETERAL STENT Right 4/6/2020    Procedure: INSERTION STENT URETERAL;  Surgeon: Corrinne Scriver, MD;  [facility];  Service: Urology    AK CYSTOURETHROSCOPY,FULGUR 0 5-2 CM LESN N/A 4/6/2020    Procedure: TRANSURETHRAL RESECTION OF BLADDER TUMOR (TURBT); Surgeon: Corrinne Scriver, MD;  [facility];  Service: Urology    AK CYSTOURETHROSCOPY,URETER CATHETER Bilateral 4/6/2020    Procedure: CYSTOSCOPY WITH RETROGRADE PYELOGRAM;  Surgeon: Corrinne Scriver, MD;  [facility];  Service: Urology    ROTATOR CUFF REPAIR Left     TONSILLECTOMY         Current Outpatient Medications   Medication Sig Dispense Refill    allopurinol (ZYLOPRIM) 100 mg tablet Take 1 tablet (100 mg total) by mouth daily 90 tablet 2    amLODIPine (NORVASC) 2 5 mg tablet Take 1 tablet (2 5 mg total) by mouth daily 90 tablet 2    chlorthalidone 25 mg tablet Take 1 tablet (25 mg total) by mouth daily 90 tablet 2    Cholecalciferol 50 MCG (2000 UT) TABS Take 2 tablets (4,000 Units total) by mouth daily      cilostazol (PLETAL) 100 mg tablet Take 1 tablet (100 mg total) by mouth 2 (two) times a day 60 tablet 5    cloNIDine (CATAPRES-TTS-3) 0 3 mg/24 hr Place 1 patch (0 3 mg total) on the skin once a week 12 patch 2    clopidogrel (PLAVIX) 75 mg tablet Take 1 tablet (75 mg total) by mouth daily 90 tablet 1    hydrocortisone 2 5 % cream Apply topically 2 (two) times a day 30 g 1    Icosapent Ethyl 1 g CAPS 2 cap twice a day with food 360 capsule 2    labetalol (NORMODYNE) 300 mg tablet Take 1 tablet (300 mg total) by mouth 2 (two) times a day 270 tablet 2    losartan (COZAAR) 100 MG tablet Take 1 5 tabs daily 135 tablet 1    predniSONE 10 mg tablet Take 4 tabs po QD x 3 days then 3 tabs po QD x 3 days then 2 tabs po QD x 3 days then 1 tab po QD x 3 days then stop 30 tablet 0     No current facility-administered medications for this visit           Allergies   Allergen Reactions    Fenofibrate Other (See Comments)      blood in urine  hx  Kidney Failure    Colesevelam Other (See Comments)      leg pains    Colestipol Itching and Other (See Comments)      Swelling lower legs    Ezetimibe GI Intolerance    Pollen Extract Allergic Rhinitis    Statins Myalgia       Review of Systems   Constitutional: Negative  Eyes: Negative  Respiratory: Negative  Cardiovascular: Negative  Musculoskeletal: Positive for arthralgias and back pain  Skin: Negative  Neurological: Positive for seizures, weakness, light-headedness and numbness  Hematological: Negative  Psychiatric/Behavioral: Negative  Video Exam    Vitals:    03/15/21 1311   BP: 136/72   BP Location: Right arm   Patient Position: Sitting   Pulse: 80   Weight: 80 7 kg (178 lb)   Height: 4' 10 5" (1 486 m)       Physical Exam  Constitutional:       Appearance: Normal appearance  Neurological:      General: No focal deficit present  Mental Status: She is alert and oriented to person, place, and time  Psychiatric:         Mood and Affect: Mood normal          Behavior: Behavior normal          Thought Content: Thought content normal          Judgment: Judgment normal           I spent 30 minutes directly with the patient during this visit      [address] acknowledges that she has consented to an online visit or consultation  She understands that the online visit is based solely on information provided by her, and that, in the absence of a face-to-face physical evaluation by the physician, the diagnosis she receives is both limited and provisional in terms of accuracy and completeness  This is not intended to replace a full medical face-to-face evaluation by the physician  Kian Angel understands and accepts these terms

## 2021-03-18 NOTE — TELEPHONE ENCOUNTER
Called nephrolog s/w Sneha De Oliveira, Dr Leighton Aguayo is not in the office this week, when he returns next week she will ask him to complete clearance form

## 2021-03-22 ENCOUNTER — DOCUMENTATION (OUTPATIENT)
Dept: NEPHROLOGY | Facility: CLINIC | Age: 76
End: 2021-03-22

## 2021-03-22 NOTE — PROGRESS NOTES
Patient was earlier seen by vascular surgeon for has order CTA of lower extremity  I have received pre procedural renal clearance from vascular surgeon- Dr Moni Tejeda  I have called and discussed RICARDO risk with patient today  Patient told me today that she does not want to proceed to CTA and does not want to do dialysis either   Patient told me that she will live with the pain but absolutely does not want to proceed to CTA     I have discussed RICARDO risk reduction strategies with patient but patient is still refusing to proceed to CTA   Will contact vascular surgeon regarding patient's wishes       Faith Jc MD  Nephrology  3/22/2021

## 2021-03-22 NOTE — TELEPHONE ENCOUNTER
I spoke to patient and she had hydration for her cardiac cath and she still wnet from Stage III to Stage IV renal failure and she refuses dialysis so she will put up with the claudication and not have aortogram   Sent message to dr Mir Eric

## 2021-03-22 NOTE — TELEPHONE ENCOUNTER
Received clearance form from nephrology and Dr Gisell Leon did not approve CTA , comments were pateint is refusing to go for CTA  Will task dr Chandrakant Matthews

## 2021-04-10 ENCOUNTER — IMMUNIZATIONS (OUTPATIENT)
Dept: FAMILY MEDICINE CLINIC | Facility: HOSPITAL | Age: 76
End: 2021-04-10

## 2021-04-10 DIAGNOSIS — Z23 ENCOUNTER FOR IMMUNIZATION: Primary | ICD-10-CM

## 2021-04-10 PROCEDURE — 0001A SARS-COV-2 / COVID-19 MRNA VACCINE (PFIZER-BIONTECH) 30 MCG: CPT

## 2021-04-10 PROCEDURE — 91300 SARS-COV-2 / COVID-19 MRNA VACCINE (PFIZER-BIONTECH) 30 MCG: CPT

## 2021-04-12 ENCOUNTER — APPOINTMENT (OUTPATIENT)
Dept: LAB | Facility: HOSPITAL | Age: 76
End: 2021-04-12
Attending: INTERNAL MEDICINE
Payer: COMMERCIAL

## 2021-04-12 DIAGNOSIS — N18.4 HYPERTENSIVE KIDNEY DISEASE WITH STAGE 4 CHRONIC KIDNEY DISEASE (HCC): ICD-10-CM

## 2021-04-12 DIAGNOSIS — I70.0 AORTOILIAC STENOSIS, LEFT (HCC): ICD-10-CM

## 2021-04-12 DIAGNOSIS — N18.4 STAGE 4 CHRONIC KIDNEY DISEASE (HCC): ICD-10-CM

## 2021-04-12 DIAGNOSIS — I10 HYPERTENSION, UNSPECIFIED TYPE: ICD-10-CM

## 2021-04-12 DIAGNOSIS — I73.9 CLAUDICATION IN PERIPHERAL VASCULAR DISEASE (HCC): ICD-10-CM

## 2021-04-12 DIAGNOSIS — E11.69 HYPERLIPIDEMIA ASSOCIATED WITH TYPE 2 DIABETES MELLITUS (HCC): ICD-10-CM

## 2021-04-12 DIAGNOSIS — E11.59 HYPERTENSION ASSOCIATED WITH DIABETES (HCC): ICD-10-CM

## 2021-04-12 DIAGNOSIS — R21 RASH: ICD-10-CM

## 2021-04-12 DIAGNOSIS — I12.9 HYPERTENSIVE KIDNEY DISEASE WITH STAGE 4 CHRONIC KIDNEY DISEASE (HCC): ICD-10-CM

## 2021-04-12 DIAGNOSIS — E79.0 HYPERURICEMIA: ICD-10-CM

## 2021-04-12 DIAGNOSIS — E55.9 VITAMIN D DEFICIENCY: ICD-10-CM

## 2021-04-12 DIAGNOSIS — Z86.73 HISTORY OF CVA (CEREBROVASCULAR ACCIDENT): ICD-10-CM

## 2021-04-12 DIAGNOSIS — R80.8 OTHER PROTEINURIA: ICD-10-CM

## 2021-04-12 DIAGNOSIS — I15.2 HYPERTENSION ASSOCIATED WITH DIABETES (HCC): ICD-10-CM

## 2021-04-12 DIAGNOSIS — E78.5 HYPERLIPIDEMIA ASSOCIATED WITH TYPE 2 DIABETES MELLITUS (HCC): ICD-10-CM

## 2021-04-12 DIAGNOSIS — R60.9 DEPENDENT EDEMA: ICD-10-CM

## 2021-04-12 DIAGNOSIS — I65.1 BASILAR ARTERY STENOSIS: ICD-10-CM

## 2021-04-12 LAB
25(OH)D3 SERPL-MCNC: 43.1 NG/ML (ref 30–100)
ALBUMIN SERPL BCP-MCNC: 3.9 G/DL (ref 3.5–5)
ALP SERPL-CCNC: 88 U/L (ref 46–116)
ALT SERPL W P-5'-P-CCNC: 24 U/L (ref 12–78)
ANION GAP SERPL CALCULATED.3IONS-SCNC: 12 MMOL/L (ref 4–13)
AST SERPL W P-5'-P-CCNC: 13 U/L (ref 5–45)
BACTERIA UR QL AUTO: ABNORMAL /HPF
BASOPHILS # BLD AUTO: 0.04 THOUSANDS/ΜL (ref 0–0.1)
BASOPHILS NFR BLD AUTO: 1 % (ref 0–1)
BILIRUB SERPL-MCNC: 0.69 MG/DL (ref 0.2–1)
BILIRUB UR QL STRIP: NEGATIVE
BUN SERPL-MCNC: 35 MG/DL (ref 5–25)
CALCIUM SERPL-MCNC: 9.3 MG/DL (ref 8.3–10.1)
CHLORIDE SERPL-SCNC: 103 MMOL/L (ref 100–108)
CHOLEST SERPL-MCNC: 317 MG/DL (ref 50–200)
CLARITY UR: CLEAR
CO2 SERPL-SCNC: 24 MMOL/L (ref 21–32)
COLOR UR: YELLOW
CREAT SERPL-MCNC: 1.9 MG/DL (ref 0.6–1.3)
CREAT UR-MCNC: 118 MG/DL
EOSINOPHIL # BLD AUTO: 0.31 THOUSAND/ΜL (ref 0–0.61)
EOSINOPHIL NFR BLD AUTO: 5 % (ref 0–6)
ERYTHROCYTE [DISTWIDTH] IN BLOOD BY AUTOMATED COUNT: 13.4 % (ref 11.6–15.1)
EST. AVERAGE GLUCOSE BLD GHB EST-MCNC: 169 MG/DL
GFR SERPL CREATININE-BSD FRML MDRD: 26 ML/MIN/1.73SQ M
GLUCOSE P FAST SERPL-MCNC: 190 MG/DL (ref 65–99)
GLUCOSE UR STRIP-MCNC: NEGATIVE MG/DL
HBA1C MFR BLD: 7.5 %
HCT VFR BLD AUTO: 41.1 % (ref 34.8–46.1)
HDLC SERPL-MCNC: 46 MG/DL
HGB BLD-MCNC: 13.7 G/DL (ref 11.5–15.4)
HGB UR QL STRIP.AUTO: NEGATIVE
HYALINE CASTS #/AREA URNS LPF: ABNORMAL /LPF
IMM GRANULOCYTES # BLD AUTO: 0.04 THOUSAND/UL (ref 0–0.2)
IMM GRANULOCYTES NFR BLD AUTO: 1 % (ref 0–2)
KETONES UR STRIP-MCNC: NEGATIVE MG/DL
LDLC SERPL CALC-MCNC: 229 MG/DL (ref 0–100)
LEUKOCYTE ESTERASE UR QL STRIP: NEGATIVE
LYMPHOCYTES # BLD AUTO: 1.38 THOUSANDS/ΜL (ref 0.6–4.47)
LYMPHOCYTES NFR BLD AUTO: 22 % (ref 14–44)
MCH RBC QN AUTO: 29.7 PG (ref 26.8–34.3)
MCHC RBC AUTO-ENTMCNC: 33.3 G/DL (ref 31.4–37.4)
MCV RBC AUTO: 89 FL (ref 82–98)
MICROALBUMIN UR-MCNC: 250 MG/L (ref 0–20)
MICROALBUMIN/CREAT 24H UR: 212 MG/G CREATININE (ref 0–30)
MONOCYTES # BLD AUTO: 0.61 THOUSAND/ΜL (ref 0.17–1.22)
MONOCYTES NFR BLD AUTO: 10 % (ref 4–12)
NEUTROPHILS # BLD AUTO: 3.78 THOUSANDS/ΜL (ref 1.85–7.62)
NEUTS SEG NFR BLD AUTO: 61 % (ref 43–75)
NITRITE UR QL STRIP: NEGATIVE
NON-SQ EPI CELLS URNS QL MICRO: ABNORMAL /HPF
NONHDLC SERPL-MCNC: 271 MG/DL
NRBC BLD AUTO-RTO: 0 /100 WBCS
PH UR STRIP.AUTO: 5.5 [PH]
PHOSPHATE SERPL-MCNC: 3.3 MG/DL (ref 2.3–4.1)
PLATELET # BLD AUTO: 211 THOUSANDS/UL (ref 149–390)
PMV BLD AUTO: 10.1 FL (ref 8.9–12.7)
POTASSIUM SERPL-SCNC: 3.6 MMOL/L (ref 3.5–5.3)
PROT SERPL-MCNC: 7.4 G/DL (ref 6.4–8.2)
PROT UR STRIP-MCNC: ABNORMAL MG/DL
PTH-INTACT SERPL-MCNC: 81.3 PG/ML (ref 18.4–80.1)
RBC # BLD AUTO: 4.61 MILLION/UL (ref 3.81–5.12)
RBC #/AREA URNS AUTO: ABNORMAL /HPF
SODIUM SERPL-SCNC: 139 MMOL/L (ref 136–145)
SP GR UR STRIP.AUTO: 1.02 (ref 1–1.03)
T4 FREE SERPL-MCNC: 1.07 NG/DL (ref 0.76–1.46)
TRIGL SERPL-MCNC: 211 MG/DL
TSH SERPL DL<=0.05 MIU/L-ACNC: 3.86 UIU/ML (ref 0.36–3.74)
URATE SERPL-MCNC: 8.4 MG/DL (ref 2–6.8)
UROBILINOGEN UR QL STRIP.AUTO: 0.2 E.U./DL
WBC # BLD AUTO: 6.16 THOUSAND/UL (ref 4.31–10.16)
WBC #/AREA URNS AUTO: ABNORMAL /HPF

## 2021-04-12 PROCEDURE — 84100 ASSAY OF PHOSPHORUS: CPT

## 2021-04-12 PROCEDURE — 84439 ASSAY OF FREE THYROXINE: CPT

## 2021-04-12 PROCEDURE — 84550 ASSAY OF BLOOD/URIC ACID: CPT

## 2021-04-12 PROCEDURE — 82043 UR ALBUMIN QUANTITATIVE: CPT

## 2021-04-12 PROCEDURE — 82570 ASSAY OF URINE CREATININE: CPT

## 2021-04-12 PROCEDURE — 80053 COMPREHEN METABOLIC PANEL: CPT

## 2021-04-12 PROCEDURE — 3066F NEPHROPATHY DOC TX: CPT | Performed by: NURSE PRACTITIONER

## 2021-04-12 PROCEDURE — 84443 ASSAY THYROID STIM HORMONE: CPT

## 2021-04-12 PROCEDURE — 83036 HEMOGLOBIN GLYCOSYLATED A1C: CPT

## 2021-04-12 PROCEDURE — 81001 URINALYSIS AUTO W/SCOPE: CPT

## 2021-04-12 PROCEDURE — 80061 LIPID PANEL: CPT

## 2021-04-12 PROCEDURE — 85025 COMPLETE CBC W/AUTO DIFF WBC: CPT

## 2021-04-12 PROCEDURE — 83970 ASSAY OF PARATHORMONE: CPT

## 2021-04-12 PROCEDURE — 36415 COLL VENOUS BLD VENIPUNCTURE: CPT

## 2021-04-12 PROCEDURE — 3060F POS MICROALBUMINURIA REV: CPT | Performed by: NURSE PRACTITIONER

## 2021-04-12 PROCEDURE — 82306 VITAMIN D 25 HYDROXY: CPT

## 2021-04-12 PROCEDURE — 3051F HG A1C>EQUAL 7.0%<8.0%: CPT | Performed by: NURSE PRACTITIONER

## 2021-04-13 NOTE — PROGRESS NOTES
Problem List Items Addressed This Visit        Genitourinary    Malignant neoplasm of overlapping sites of bladder [facility]) - Primary       Other    Person consulting for explanation of examination or test finding            Discussion:    Carlitos Sotelo Is doing well, ECOG performance status of 0, no lower urinary tract symptoms at this time, other than mild urinary frequency, her surveillance cystoscopy today is negative, as per the updated NCCN guidelines for low risk bladder cancer she does not require a surveillance cystoscopy for 1 year, she will return in 1 year for surveillance purposes unless otherwise for cause prior to that      Assessment and plan:     Please see problem oriented charting for the assessment plan of today's urological complaints      Ninoska Ni MD      Chief Complaint     Chief Complaint   Patient presents with    Malignant neoplasm of urinary bladder, unspecified site [facility]    Cystoscopy         History of Present Illness     Rajiv Balbuena is a 70 y o  woman with history of low grade bladder cancer, surveillance cystoscopy up to this point  Cystoscopy today performed and shows  No evidence of recurrence, please see separate procedure note for details    New complaints include  No new urologic complaints, she did have some severe for right is some weeks ago, and some small bruising on the legs from excessive scratching, this is resolving, had this worked up with her primary care provider, unclear cause    The following portions of the patient's history were reviewed and updated as appropriate: allergies, current medications, past family history, past medical history, past social history, past surgical history and problem list     Detailed Urologic History     - please refer to HPI    Review of Systems     Review of Systems   Constitutional: Negative  HENT: Negative  Eyes: Negative  Respiratory: Negative  Cardiovascular: Negative  Gastrointestinal: Negative      Endocrine: Negative  Genitourinary: Negative  Musculoskeletal: Negative  Skin: Negative  Allergic/Immunologic: Negative  Neurological: Negative  Hematological: Negative  Psychiatric/Behavioral: Negative  Allergies     Allergies   Allergen Reactions    Fenofibrate Other (See Comments)      blood in urine  hx  Kidney Failure    Colesevelam Other (See Comments)      leg pains    Colestipol Itching and Other (See Comments)      Swelling lower legs    Ezetimibe GI Intolerance    Statins Myalgia       Physical Exam     Physical Exam  Vitals signs reviewed  Constitutional:       General: She is not in acute distress  Appearance: Normal appearance  She is obese  She is not ill-appearing, toxic-appearing or diaphoretic  HENT:      Head: Normocephalic and atraumatic  Eyes:      General: No scleral icterus  Right eye: No discharge  Left eye: No discharge  Cardiovascular:      Pulses: Normal pulses  Pulmonary:      Effort: Pulmonary effort is normal    Abdominal:      General: There is no distension  Palpations: There is no mass  Genitourinary:     Comments: Chaperone present for examination, normal, age-related changes to the vulva, no vaginal lesions, patent urethral meatus, no urethral lesions  Musculoskeletal:         General: No tenderness  Skin:     General: Skin is warm  Neurological:      General: No focal deficit present  Mental Status: She is alert and oriented to person, place, and time  Cranial Nerves: No cranial nerve deficit  Psychiatric:         Mood and Affect: Mood normal          Behavior: Behavior normal          Thought Content:  Thought content normal          Judgment: Judgment normal              Vital Signs  Vitals:    04/14/21 1024   BP: 130/84   Pulse: 83   Weight: 80 3 kg (177 lb)   Height: 4' 10" (1 473 m)         Current Medications       Current Outpatient Medications:     amLODIPine (NORVASC) 2 5 mg tablet, Take 1 tablet (2 5 mg total) by mouth daily, Disp: 90 tablet, Rfl: 2    chlorthalidone 25 mg tablet, Take 1 tablet (25 mg total) by mouth daily, Disp: 90 tablet, Rfl: 2    Cholecalciferol 50 MCG (2000 UT) TABS, Take 2 tablets (4,000 Units total) by mouth daily, Disp: , Rfl:     cilostazol (PLETAL) 100 mg tablet, Take 1 tablet (100 mg total) by mouth 2 (two) times a day, Disp: 60 tablet, Rfl: 5    cloNIDine (CATAPRES-TTS-3) 0 3 mg/24 hr, Place 1 patch (0 3 mg total) on the skin once a week, Disp: 12 patch, Rfl: 2    clopidogrel (PLAVIX) 75 mg tablet, Take 1 tablet (75 mg total) by mouth daily, Disp: 90 tablet, Rfl: 1    hydrocortisone 2 5 % cream, Apply topically 2 (two) times a day, Disp: 30 g, Rfl: 1    Icosapent Ethyl 1 g CAPS, 2 cap twice a day with food, Disp: 360 capsule, Rfl: 2    losartan (COZAAR) 100 MG tablet, Take 1 5 tabs daily, Disp: 135 tablet, Rfl: 1    predniSONE 10 mg tablet, Take 4 tabs po QD x 3 days then 3 tabs po QD x 3 days then 2 tabs po QD x 3 days then 1 tab po QD x 3 days then stop, Disp: 30 tablet, Rfl: 0    allopurinol (ZYLOPRIM) 100 mg tablet, Take 1 tablet (100 mg total) by mouth daily, Disp: 90 tablet, Rfl: 2    labetalol (NORMODYNE) 300 mg tablet, Take 1 tablet (300 mg total) by mouth 2 (two) times a day, Disp: 270 tablet, Rfl: 2      Active Problems     Patient Active Problem List   Diagnosis    Spinal stenosis    Basilar artery stenosis    Breast mass    [facility]vascular accident ([facility]    Chronic GERD    Stage 3 chronic kidney disease    Claudication in peripheral vascular disease ([facility] )    Type 2 diabetes mellitus with diabetic nephropathy (HCC)    Endometriosis    Gout    Hx-TIA (transient ischemic attack)    Hypercholesteremia    Hyperlipidemia associated with type 2 diabetes mellitus ([facility] )    Hypertension    Hypertension associated with diabetes ([facility] )    Osteoarthritis    Obesity (BMI 30-39  9)    Polyneuropathy    Right renal artery stenosis (HCC)    Tobacco use disorder    Vertebrobasilar artery syndrome    Vitamin D deficiency    Statin intolerance    Lumbar disc herniation    Pain of left lower extremity    Abnormal EKG    Spondylosis of lumbar region without myelopathy or radiculopathy    Aortoiliac stenosis, left (HCC)    Hypokalemia    Acute drug-induced gout of left foot    Decreased pulses in feet    Refused influenza vaccine    Hypercalcemia    Lumbosacral spondylosis without myelopathy    Neurodermatitis    Other proteinuria    Obesity with body mass index 30 or greater    Hyperlipidemia, unspecified    Herniated lumbar intervertebral disc    Atherosclerosis of renal artery (HCC)    Celiac artery stenosis (HCC) >70% stenosis in the celiac trunk    Abnormal CT of the chest suspicious for an infectious or inflammatory bronchiolitis   Positive cardiac stress test  small, mildly severe, partially reversible myocardial perfusion defect of anterior and inferior wall     Femoral artery stenosis, right (HCC) 50-75% stenosis in the common femoral artery      Mesenteric artery stenosis (HCC)    Immunization not carried out because of patient refusal    Median arcuate ligament syndrome [facility]    Abdominal bruit    Initial Medicare annual wellness visit    Hypertensive kidney disease with stage 3 chronic kidney disease    Atherosclerosis of native arteries of extremities with intermittent claudication, bilateral legs (HCC)    Asymptomatic bilateral carotid artery stenosis    Pulmonary nodule 7 mm groundglass opacity in the right upper lobe, unchanged since October 2019    Stenosis of left anterior descending artery Mid LAD 50-60% stenosis    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    Urinary frequency    Malignant neoplasm of overlapping sites of bladder ([facility] )    Encounter for removal of ureteral stent    Cervical radiculopathy    Acute pain of left shoulder    Edema of left lower extremity    Dark urine    Localized edema    Pressure injury of buttock, stage 1    Stage 4 chronic kidney disease (HCC)    Hypertensive kidney disease with stage 4 chronic kidney disease (HCC)    Hyperuricemia    Rash    Embolism and 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)    Depression, recurrent (HCC)    Obesity, morbid [facility] )    Person consulting for explanation of examination or test finding         Past Medical History     Past Medical History:   Diagnosis Date    Arthritis     Chronic kidney disease     Endometriosis     High cholesterol     Hypertension     Kidney disease, chronic, stage III (moderate, EGFR 30-59 ml/min)     Lumbar disc herniation     Neuropathy     Peripheral vascular disease ([address] )     Pneumonia     Shoulder injury     left    Spinal stenosis     Stroke ([facility] ) 2015    Memory loss         Surgical History     Past Surgical History:   Procedure Laterality Date    CARDIAC CATHETERIZATION      COLONOSCOPY      FL RETROGRADE PYELOGRAM  4/6/2020    FRACTURE SURGERY Right     ankle    OVARIAN CYST SURGERY      NE CYSTOSCOPY,INSERT URETERAL STENT Right 4/6/2020    Procedure: INSERTION STENT URETERAL;  Surgeon: Grayson King MD;  [facility];  Service: Urology    NE CYSTOURETHROSCOPY,FULGUR 0 5-2 CM LESN N/A 4/6/2020    Procedure: TRANSURETHRAL RESECTION OF BLADDER TUMOR (TURBT);   Surgeon: Grayson King MD;  [facility];  Service: Urology    NE CYSTOURETHROSCOPY,URETER CATHETER Bilateral 4/6/2020    Procedure: CYSTOSCOPY WITH RETROGRADE PYELOGRAM;  Surgeon: Grayson King MD;  [facility];  Service: Urology    ROTATOR CUFF REPAIR Left     TONSILLECTOMY           Family History     Family History   Problem Relation Age of Onset    Hypertension Mother     Heart disease Mother         Valvular    Hyperlipidemia Mother     Hypertension Father     Heart defect Father         Cardiomegaly    Stroke Sister Cerebrovascular Accident    Arthritis Brother     Other Brother         Back Disorder         Social History     Social History     Social History     Tobacco Use   Smoking Status Former Smoker    Packs/day: 0 00    Types: Cigarettes    Quit date: 2020    Years since quittin 7   Smokeless Tobacco Never Used         Pertinent Lab Values     Lab Results   Component Value Date    CREATININE 1 90 (H) 2021       Final Diagnosis   A  Urinary Bladder, right trigone, TURBT:  - Noninvasive low-grade papillary urothelial carcinoma  - No lamina propria invasion is identified   - Muscularis propria, negative for carcinoma     Electronically signed by Sherryle Sessions, MD on 2020 at  3:06 PM           Pertinent Imaging       No new imaging for my review

## 2021-04-14 ENCOUNTER — PROCEDURE VISIT (OUTPATIENT)
Dept: UROLOGY | Facility: CLINIC | Age: 76
End: 2021-04-14
Payer: COMMERCIAL

## 2021-04-14 VITALS
HEIGHT: 58 IN | SYSTOLIC BLOOD PRESSURE: 130 MMHG | HEART RATE: 83 BPM | BODY MASS INDEX: 37.16 KG/M2 | DIASTOLIC BLOOD PRESSURE: 84 MMHG | WEIGHT: 177 LBS

## 2021-04-14 DIAGNOSIS — C67.8 MALIGNANT NEOPLASM OF OVERLAPPING SITES OF BLADDER (HCC): Primary | ICD-10-CM

## 2021-04-14 DIAGNOSIS — Z71.2 PERSON CONSULTING FOR EXPLANATION OF EXAMINATION OR TEST FINDING: ICD-10-CM

## 2021-04-14 PROCEDURE — 3079F DIAST BP 80-89 MM HG: CPT | Performed by: UROLOGY

## 2021-04-14 PROCEDURE — 99213 OFFICE O/P EST LOW 20 MIN: CPT | Performed by: UROLOGY

## 2021-04-14 PROCEDURE — 3075F SYST BP GE 130 - 139MM HG: CPT | Performed by: UROLOGY

## 2021-04-14 NOTE — PROGRESS NOTES
Office Cystoscopy Procedure Note    Indication:    Urothelial carcinoma of the bladder low risk, surveillance    Informed consent   The risks, benefits, complications, treatment options, and expected outcomes were discussed with the patient  The patient concurred with the proposed plan and provided informed consent  Anesthesia  Lidocaine jelly 2%    Antibiotic prophylaxis   None    Procedure  In the presence of a female nurse, the patient was placed in the supine frog-legged position, was prepped and draped in the usual manner using sterile technique, and 2% lidocaine jelly instilled into the urethra  Prior to this pelvic examination showed  normal labia majora and minora, a  Normal caliber vaginal introitus,  Moderate to poor quality vaginal mucosa, and showed  Slight anterior wall pelvic floor descensus and  no urethral hypermobility  Upon stress maneuvers there was  no stress incontinence  A 17 F flexible cystoscope was then inserted into the urethra and the urethra and bladder carefully examined    The following findings were noted:    Findings:  Urethra:  Normal  Bladder:  Normal, no evidence of recurrence or papillary tumors or CIS  Ureteral orifices:  Normal  Other findings:  None     Specimens: None                 Complications:    None; patient tolerated the procedure well           Disposition: To home       Condition: Stable    Plan: Negative surveillance cystoscopy, return in 1 year given low risk disease category

## 2021-04-16 ENCOUNTER — VBI (OUTPATIENT)
Dept: ADMINISTRATIVE | Facility: OTHER | Age: 76
End: 2021-04-16

## 2021-04-22 ENCOUNTER — RA CDI HCC (OUTPATIENT)
Dept: OTHER | Facility: HOSPITAL | Age: 76
End: 2021-04-22

## 2021-04-22 NOTE — PROGRESS NOTES
[facility] coding opportunities             Chart reviewed, (number of) suggestions sent to provider: 2     Problem listed updated  Provider Accepted, (number of) suggestions accepted: 2   DX E11 51 used     Patients insurance company: [address]  (Medicare Advantage and [employer])     Visit status: Patient arrived for their scheduled appointment        [facility] coding opportunities             Chart reviewed, (number of) suggestions sent to provider: 2     Problem listed updated   Provider Accepted, (number of) suggestions accepted: 2        Patients insurance company: [address]  (Medicare Advantage and [employer])           [facility] coding opportunities             Chart reviewed, (number of) suggestions sent to provider: 2      DX; E11 22 Type 2 diabetes mellitus with diabetic chronic kidney disease  DX: E11 51 Type 2 diabetes mellitus with diabetic peripheral angiopathy without gangrene       Patients insurance company: [address]  (Medicare Advantage and [employer])

## 2021-04-27 PROBLEM — E11.22 TYPE 2 DIABETES MELLITUS WITH CHRONIC KIDNEY DISEASE (HCC): Status: ACTIVE | Noted: 2021-04-27

## 2021-04-27 PROBLEM — E11.51 TYPE 2 DIABETES MELLITUS WITH DIABETIC PERIPHERAL ANGIOPATHY WITHOUT GANGRENE (HCC): Status: ACTIVE | Noted: 2021-04-27

## 2021-04-28 ENCOUNTER — OFFICE VISIT (OUTPATIENT)
Dept: INTERNAL MEDICINE CLINIC | Facility: CLINIC | Age: 76
End: 2021-04-28
Payer: COMMERCIAL

## 2021-04-28 VITALS
OXYGEN SATURATION: 99 % | TEMPERATURE: 98 F | BODY MASS INDEX: 37.36 KG/M2 | DIASTOLIC BLOOD PRESSURE: 80 MMHG | RESPIRATION RATE: 16 BRPM | HEIGHT: 58 IN | SYSTOLIC BLOOD PRESSURE: 138 MMHG | WEIGHT: 178 LBS | HEART RATE: 86 BPM

## 2021-04-28 DIAGNOSIS — E11.69 HYPERLIPIDEMIA ASSOCIATED WITH TYPE 2 DIABETES MELLITUS (HCC): ICD-10-CM

## 2021-04-28 DIAGNOSIS — I70.213 ATHEROSCLEROSIS OF NATIVE ARTERIES OF EXTREMITIES WITH INTERMITTENT CLAUDICATION, BILATERAL LEGS (HCC): ICD-10-CM

## 2021-04-28 DIAGNOSIS — I10 HYPERTENSION, UNSPECIFIED TYPE: ICD-10-CM

## 2021-04-28 DIAGNOSIS — F33.9 DEPRESSION, RECURRENT (HCC): ICD-10-CM

## 2021-04-28 DIAGNOSIS — M1A.3720 CHRONIC GOUT DUE TO RENAL IMPAIRMENT INVOLVING TOE OF LEFT FOOT WITHOUT TOPHUS: ICD-10-CM

## 2021-04-28 DIAGNOSIS — K21.9 CHRONIC GERD: Primary | ICD-10-CM

## 2021-04-28 DIAGNOSIS — E78.5 HYPERLIPIDEMIA ASSOCIATED WITH TYPE 2 DIABETES MELLITUS (HCC): ICD-10-CM

## 2021-04-28 DIAGNOSIS — F17.200 TOBACCO USE DISORDER: ICD-10-CM

## 2021-04-28 DIAGNOSIS — E11.51 TYPE 2 DIABETES MELLITUS WITH DIABETIC PERIPHERAL ANGIOPATHY WITHOUT GANGRENE, WITHOUT LONG-TERM CURRENT USE OF INSULIN (HCC): ICD-10-CM

## 2021-04-28 DIAGNOSIS — N18.4 STAGE 4 CHRONIC KIDNEY DISEASE (HCC): ICD-10-CM

## 2021-04-28 PROBLEM — L89.301 PRESSURE INJURY OF BUTTOCK, STAGE 1: Status: RESOLVED | Noted: 2020-09-28 | Resolved: 2021-04-28

## 2021-04-28 PROBLEM — R21 RASH: Status: RESOLVED | Noted: 2021-01-20 | Resolved: 2021-04-28

## 2021-04-28 PROCEDURE — 3288F FALL RISK ASSESSMENT DOCD: CPT | Performed by: NURSE PRACTITIONER

## 2021-04-28 PROCEDURE — 1170F FXNL STATUS ASSESSED: CPT | Performed by: NURSE PRACTITIONER

## 2021-04-28 PROCEDURE — G0439 PPPS, SUBSEQ VISIT: HCPCS | Performed by: NURSE PRACTITIONER

## 2021-04-28 PROCEDURE — 99214 OFFICE O/P EST MOD 30 MIN: CPT | Performed by: NURSE PRACTITIONER

## 2021-04-28 PROCEDURE — 1160F RVW MEDS BY RX/DR IN RCRD: CPT | Performed by: NURSE PRACTITIONER

## 2021-04-28 PROCEDURE — 3725F SCREEN DEPRESSION PERFORMED: CPT | Performed by: NURSE PRACTITIONER

## 2021-04-28 PROCEDURE — 1125F AMNT PAIN NOTED PAIN PRSNT: CPT | Performed by: NURSE PRACTITIONER

## 2021-04-28 PROCEDURE — 1036F TOBACCO NON-USER: CPT | Performed by: NURSE PRACTITIONER

## 2021-04-28 PROCEDURE — 3008F BODY MASS INDEX DOCD: CPT | Performed by: NURSE PRACTITIONER

## 2021-04-28 NOTE — PROGRESS NOTES
Assessment/Plan:    1  Medicare wellness completed- Declines mammogram   2  Hypertension- At gfoal  Continue present medicaiotn  3  Hyperlipidemia- Not at goal  LDL markedly elevated  Followed by cardiology, who recommended Repatha, patient not interested at this time  4  Peripheral artery disease- Followed by Dr Josi Kohler, vascular surgeon  5  Chronic Kidney Disease- Followed by nephrology  6  Gout- Stable on Allopurinol  7  Abnormal CT chest in the past- Followed by pulmonology  8  Diabetes- Not at goal  A1c 7 5  Medications discussed, she is not interested at this time  Limit white pasta/bread/rice/potatoes and sweets such as milkshakes and ice cream and recheck A1c in four months  If A1c remains elevated, advise initiation of medication  Follow up in four months, labs prior  Diagnoses and all orders for this visit:    Chronic GERD    Type 2 diabetes mellitus with diabetic peripheral angiopathy without gangrene, without long-term current use of insulin (HCC)  -     HEMOGLOBIN A1C W/ EAG ESTIMATION; Future    Hypertension, unspecified type  -     CBC and differential; Future  -     Comprehensive metabolic panel; Future  -     TSH, 3rd generation with Free T4 reflex; Future  -     Magnesium; Future  -     Phosphorus; Future    Stage 4 chronic kidney disease (HCC)    Tobacco use disorder    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  -     Uric acid; Future    Depression, recurrent ([address] )    Atherosclerosis of native arteries of extremities with intermittent claudication, bilateral legs (HCC)    Hyperlipidemia associated with type 2 diabetes mellitus ([address] )  -     Lipid panel; Future        The patient was counseled regarding instructions for management, risk factor reductions, patient and family education,impressions, risks and benefits of treatment options, side effects of medications, importance of compliance with treatment   The treatment plan was reviewed with the patient/guardian and patient/guardian understands and agrees with the treatment plan  Current Outpatient Medications:     allopurinol (ZYLOPRIM) 100 mg tablet, Take 1 tablet (100 mg total) by mouth daily, Disp: 90 tablet, Rfl: 2    amLODIPine (NORVASC) 2 5 mg tablet, Take 1 tablet (2 5 mg total) by mouth daily, Disp: 90 tablet, Rfl: 2    chlorthalidone 25 mg tablet, Take 1 tablet (25 mg total) by mouth daily, Disp: 90 tablet, Rfl: 2    Cholecalciferol 50 MCG (2000 UT) TABS, Take 2 tablets (4,000 Units total) by mouth daily, Disp: , Rfl:     cilostazol (PLETAL) 100 mg tablet, Take 1 tablet (100 mg total) by mouth 2 (two) times a day, Disp: 60 tablet, Rfl: 5    cloNIDine (CATAPRES-TTS-3) 0 3 mg/24 hr, Place 1 patch (0 3 mg total) on the skin once a week, Disp: 12 patch, Rfl: 2    clopidogrel (PLAVIX) 75 mg tablet, Take 1 tablet (75 mg total) by mouth daily, Disp: 90 tablet, Rfl: 1    hydrocortisone 2 5 % cream, Apply topically 2 (two) times a day, Disp: 30 g, Rfl: 1    Icosapent Ethyl 1 g CAPS, 2 cap twice a day with food, Disp: 360 capsule, Rfl: 2    losartan (COZAAR) 100 MG tablet, Take 1 5 tabs daily, Disp: 135 tablet, Rfl: 1    predniSONE 10 mg tablet, Take 4 tabs po QD x 3 days then 3 tabs po QD x 3 days then 2 tabs po QD x 3 days then 1 tab po QD x 3 days then stop, Disp: 30 tablet, Rfl: 0    labetalol (NORMODYNE) 300 mg tablet, Take 1 tablet (300 mg total) by mouth 2 (two) times a day, Disp: 270 tablet, Rfl: 2    Subjective:      Patient ID: Jass Painter is a 70 y o  female  Here for follow up  No complaints today  Frustrated with number of doctor appointments and tests she has been having  Had 1st Covid vaccine, second is scheduled for Saturday this week        The following portions of the patient's history were reviewed and updated as appropriate:   She has a past medical history of Arthritis, Chronic kidney disease, Endometriosis, High cholesterol, Hypertension, Kidney disease, chronic, stage III (moderate, EGFR 30-59 ml/min) (HCC), Lumbar disc herniation, Neuropathy, Peripheral vascular disease ([address] ), Pneumonia, Shoulder injury, Spinal stenosis, and Stroke ([facility] ) (2015)  ,  does not have any pertinent problems on file  ,   has a past surgical history that includes Rotator cuff repair (Left); Tonsillectomy; Ovarian cyst surgery; Fracture surgery (Right); Colonoscopy; Cardiac catheterization; FL retrograde pyelogram (4/6/2020); pr cystourethroscopy,fulgur 0 5-2 cm lesn (N/A, 4/6/2020); pr cystourethroscopy,ureter catheter (Bilateral, 4/6/2020); and pr cystoscopy,insert ureteral stent (Right, 4/6/2020)  ,  family history includes Arthritis in her brother; Heart defect in her father; Heart disease in her mother; Hyperlipidemia in her mother; Hypertension in her father and mother; Other in her brother; Stroke in her sister  ,   reports that she quit smoking about 9 months ago  Her smoking use included cigarettes  She smoked 0 00 packs per day  She has never used smokeless tobacco  She reports that she does not drink alcohol or use drugs  ,  is allergic to fenofibrate; colesevelam; colestipol; ezetimibe; and statins       Review of Systems   Constitutional: Negative  Respiratory: Negative  Cardiovascular: Negative  Musculoskeletal: Positive for gait problem  Psychiatric/Behavioral: Negative            Objective:  /80   Pulse 86   Temp 98 °F (36 7 °C) (Tympanic)   Resp 16   Ht 4' 10" (1 473 m)   Wt 80 7 kg (178 lb)   LMP  (LMP Unknown)   SpO2 99%   BMI 37 20 kg/m²     Lab Review  Appointment on 04/12/2021   Component Date Value    WBC 04/12/2021 6 16     RBC 04/12/2021 4 61     Hemoglobin 04/12/2021 13 7     Hematocrit 04/12/2021 41 1     MCV 04/12/2021 89     MCH 04/12/2021 29 7     MCHC 04/12/2021 33 3     RDW 04/12/2021 13 4     MPV 04/12/2021 10 1     Platelets 71/88/3746 211     nRBC 04/12/2021 0     Neutrophils Relative 04/12/2021 61     Immat GRANS % 04/12/2021 1     Lymphocytes Relative 04/12/2021 22     Monocytes Relative 04/12/2021 10     Eosinophils Relative 04/12/2021 5     Basophils Relative 04/12/2021 1     Neutrophils Absolute 04/12/2021 3 78     Immature Grans Absolute 04/12/2021 0 04     Lymphocytes Absolute 04/12/2021 1 38     Monocytes Absolute 04/12/2021 0 61     Eosinophils Absolute 04/12/2021 0 31     Basophils Absolute 04/12/2021 0 04     Color, UA 04/12/2021 Yellow     Clarity, UA 04/12/2021 Clear     Specific Gravity, UA 04/12/2021 1 025     pH, UA 04/12/2021 5 5     Leukocytes, UA 04/12/2021 Negative     Nitrite, UA 04/12/2021 Negative     Protein, UA 04/12/2021 Trace*    Glucose, UA 04/12/2021 Negative     Ketones, UA 04/12/2021 Negative     Urobilinogen, UA 04/12/2021 0 2     Bilirubin, UA 04/12/2021 Negative     Blood, UA 04/12/2021 Negative     Creatinine, Ur 04/12/2021 118 0     Microalbum  ,U,Random 04/12/2021 250 0*    Microalb Creat Ratio 04/12/2021 212*    Phosphorus 04/12/2021 3 3     Uric Acid 04/12/2021 8 4*    PTH 04/12/2021 81 3*    Vit D, 25-Hydroxy 04/12/2021 43 1     Sodium 04/12/2021 139     Potassium 04/12/2021 3 6     Chloride 04/12/2021 103     CO2 04/12/2021 24     ANION GAP 04/12/2021 12     BUN 04/12/2021 35*    Creatinine 04/12/2021 1 90*    Glucose, Fasting 04/12/2021 190*    Calcium 04/12/2021 9 3     AST 04/12/2021 13     ALT 04/12/2021 24     Alkaline Phosphatase 04/12/2021 88     Total Protein 04/12/2021 7 4     Albumin 04/12/2021 3 9     Total Bilirubin 04/12/2021 0 69     eGFR 04/12/2021 26     Hemoglobin A1C 04/12/2021 7 5*    EAG 04/12/2021 169     Cholesterol 04/12/2021 317*    Triglycerides 04/12/2021 211*    HDL, Direct 04/12/2021 46     LDL Calculated 04/12/2021 229*    Non-HDL-Chol (CHOL-HDL) 04/12/2021 271     TSH 3RD GENERATON 04/12/2021 3 856*    RBC, UA 04/12/2021 None Seen     WBC, UA 04/12/2021 None Seen     Epithelial Cells 04/12/2021 Occasional     Bacteria, UA 04/12/2021 Occasional     Hyaline Casts, UA 04/12/2021 0-1*    Free T4 04/12/2021 1 07         Imaging: No results found  Physical Exam  Constitutional:       Appearance: She is well-developed  Cardiovascular:      Rate and Rhythm: Normal rate and regular rhythm  Heart sounds: Normal heart sounds  Pulmonary:      Effort: Pulmonary effort is normal       Breath sounds: Normal breath sounds  Musculoskeletal:      Comments: Ambulates with a can   Neurological:      Mental Status: She is alert and oriented to person, place, and time  Deep Tendon Reflexes: Reflexes are normal and symmetric  Psychiatric:         Behavior: Behavior normal          Thought Content:  Thought content normal          Judgment: Judgment normal

## 2021-04-28 NOTE — PROGRESS NOTES
Assessment and Plan:     Problem List Items Addressed This Visit     None           Preventive health issues were discussed with patient, and age appropriate screening tests were ordered as noted in patient's After Visit Summary  Personalized health advice and appropriate referrals for health education or preventive services given if needed, as noted in patient's After Visit Summary  History of Present Illness:     Patient presents for Medicare Annual Wellness visit    Patient Care Team:  Carolina Ramirez as PCP - General (Internal Medicine)  Felisha Lyman MD as Neurologist  Carolina Ramirez as Nurse Practitioner (Internal Medicine)  Ginna Harman MD (Urology)  Judith Serra MD as Cardiologist (Cardiology)     Problem List:     Patient Active Problem List   Diagnosis    Spinal stenosis    Basilar artery stenosis    Breast mass    Cerebrovascular accident (CVA) [facility] )    Chronic GERD    Stage 3 chronic kidney disease ([facility] )    Claudication in peripheral vascular disease ([facility] )    Type 2 diabetes mellitus with diabetic nephropathy ([facility] )    Endometriosis    Gout    Hx-TIA (transient ischemic attack)    Hypercholesteremia    Hyperlipidemia associated with type 2 diabetes mellitus ([facility] )    Hypertension    Hypertension associated with diabetes ([facility] )    Osteoarthritis    Obesity (BMI 30-39  9)    Polyneuropathy    Right renal artery stenosis (HCC)    Tobacco use disorder    Vertebrobasilar artery syndrome    Vitamin D deficiency    Statin intolerance    Lumbar disc herniation    Pain of left lower extremity    Abnormal EKG    Spondylosis of lumbar region without myelopathy or radiculopathy    Aortoiliac stenosis, left (HCC)    Hypokalemia    Acute drug-induced gout of left foot    Decreased pulses in feet    Refused influenza vaccine    Hypercalcemia    Lumbosacral spondylosis without myelopathy    Neurodermatitis    Other proteinuria    Obesity with body mass index 30 or greater    Hyperlipidemia, unspecified    Herniated lumbar intervertebral disc    Atherosclerosis of renal artery (HCC)    Celiac artery stenosis (HCC) >70% stenosis in the celiac trunk    Abnormal CT of the chest suspicious for an infectious or inflammatory bronchiolitis   Positive cardiac stress test  small, mildly severe, partially reversible myocardial perfusion defect of anterior and inferior wall     Femoral artery stenosis, right (HCC) 50-75% stenosis in the common femoral artery      Mesenteric artery stenosis (HCC)    Immunization not carried out because of patient refusal    Median arcuate ligament syndrome ([facility] [address] )    Abdominal bruit    Initial Medicare annual wellness visit    Hypertensive kidney disease with stage 3 chronic kidney disease ([address] )    Atherosclerosis of native arteries of extremities with intermittent claudication, bilateral legs (HCC)    Asymptomatic bilateral carotid artery stenosis    Pulmonary nodule 7 mm groundglass opacity in the right upper lobe, unchanged since October 2019    Stenosis of left anterior descending artery Mid LAD 50-60% stenosis    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    Urinary frequency    Malignant neoplasm of overlapping sites of bladder ([address] )    Encounter for removal of ureteral stent    Cervical radiculopathy    Acute pain of left shoulder    Edema of left lower extremity    Dark urine    Localized edema    Pressure injury of buttock, stage 1    Stage 4 chronic kidney disease ([address] )    Hypertensive kidney disease with stage 4 chronic kidney disease (HCC)    Hyperuricemia    Rash    Embolism and 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)    Depression, recurrent (HCC)    Obesity, morbid ([address] )   [facility] Person consulting for explanation of examination or test finding    Type 2 diabetes mellitus with chronic kidney disease ([address] )    Type 2 diabetes mellitus with diabetic peripheral angiopathy without gangrene [facility])      Past Medical and Surgical History:     Past Medical History:   Diagnosis Date    Arthritis     Chronic kidney disease     Endometriosis     High cholesterol     Hypertension     Kidney disease, chronic, stage III (moderate, EGFR 30-59 ml/min) (HCC)     Lumbar disc herniation     Neuropathy     Peripheral vascular disease (HCC)     Pneumonia     Shoulder injury     left    Spinal stenosis     Stroke ([facility] [address] ) 2015    Memory loss     Past Surgical History:   Procedure Laterality Date    CARDIAC CATHETERIZATION      COLONOSCOPY      FL RETROGRADE PYELOGRAM  4/6/2020    FRACTURE SURGERY Right     ankle    OVARIAN CYST SURGERY      OH CYSTOSCOPY,INSERT URETERAL STENT Right 4/6/2020    Procedure: INSERTION STENT URETERAL;  Surgeon: Lazaro Pham MD;  [facility];  Service: Urology    OH CYSTOURETHROSCOPY,FULGUR 0 5-2 CM LESN N/A 4/6/2020    Procedure: TRANSURETHRAL RESECTION OF BLADDER TUMOR (TURBT);   Surgeon: Lazaro Pham MD;  [facility];  Service: Urology    OH CYSTOURETHROSCOPY,URETER CATHETER Bilateral 4/6/2020    Procedure: CYSTOSCOPY WITH RETROGRADE PYELOGRAM;  Surgeon: Lazaro Pham MD;  [facility];  Service: Urology    ROTATOR CUFF REPAIR Left     TONSILLECTOMY        Family History:     Family History   Problem Relation Age of Onset    Hypertension Mother     Heart disease Mother         Valvular    Hyperlipidemia Mother     Hypertension Father     Heart defect Father         Cardiomegaly    Stroke Sister         Cerebrovascular Accident    Arthritis Brother     Other Brother         Back Disorder      Social History:     E-Cigarette/Vaping    E-Cigarette Use Never User      E-Cigarette/Vaping Substances    Nicotine No     THC No     CBD No     Flavoring No     Other No     Unknown No      Social History     Socioeconomic History    Marital status: /Civil Union     Spouse name: None  Number of children: None    Years of education: None    Highest education level: None   Occupational History    None   Social Needs    Financial resource strain: None    Food insecurity     Worry: None     Inability: None    Transportation needs     Medical: None     Non-medical: None   Tobacco Use    Smoking status: Former Smoker     Packs/day: 0 00     Types: Cigarettes     Quit date: 2020     Years since quittin 7    Smokeless tobacco: Never Used   Substance and Sexual Activity    Alcohol use: No    Drug use: No    Sexual activity: Not Currently     Partners: Male   Lifestyle    Physical activity     Days per week: None     Minutes per session: None    Stress: None   Relationships    Social connections     Talks on phone: None     Gets together: None     Attends Gnosticist service: None     Active member of club or organization: None     Attends meetings of clubs or organizations: None     Relationship status: None    Intimate partner violence     Fear of current or ex partner: None     Emotionally abused: None     Physically abused: None     Forced sexual activity: None   Other Topics Concern    None   Social History Narrative    Occasional Caffeine Consumption      Medications and Allergies:     Current Outpatient Medications   Medication Sig Dispense Refill    allopurinol (ZYLOPRIM) 100 mg tablet Take 1 tablet (100 mg total) by mouth daily 90 tablet 2    amLODIPine (NORVASC) 2 5 mg tablet Take 1 tablet (2 5 mg total) by mouth daily 90 tablet 2    chlorthalidone 25 mg tablet Take 1 tablet (25 mg total) by mouth daily 90 tablet 2    Cholecalciferol 50 MCG (2000 UT) TABS Take 2 tablets (4,000 Units total) by mouth daily      cilostazol (PLETAL) 100 mg tablet Take 1 tablet (100 mg total) by mouth 2 (two) times a day 60 tablet 5    cloNIDine (CATAPRES-TTS-3) 0 3 mg/24 hr Place 1 patch (0 3 mg total) on the skin once a week 12 patch 2    clopidogrel (PLAVIX) 75 mg tablet Take 1 tablet (75 mg total) by mouth daily 90 tablet 1    hydrocortisone 2 5 % cream Apply topically 2 (two) times a day 30 g 1    Icosapent Ethyl 1 g CAPS 2 cap twice a day with food 360 capsule 2    losartan (COZAAR) 100 MG tablet Take 1 5 tabs daily 135 tablet 1    predniSONE 10 mg tablet Take 4 tabs po QD x 3 days then 3 tabs po QD x 3 days then 2 tabs po QD x 3 days then 1 tab po QD x 3 days then stop 30 tablet 0    labetalol (NORMODYNE) 300 mg tablet Take 1 tablet (300 mg total) by mouth 2 (two) times a day 270 tablet 2     No current facility-administered medications for this visit  Allergies   Allergen Reactions    Fenofibrate Other (See Comments)      blood in urine  hx  Kidney Failure    Colesevelam Other (See Comments)      leg pains    Colestipol Itching and Other (See Comments)      Swelling lower legs    Ezetimibe GI Intolerance    Statins Myalgia      Immunizations:     Immunization History   Administered Date(s) Administered    Pneumococcal Conjugate 13-Valent 09/21/2016    Pneumococcal Polysaccharide PPV23 12/14/2011    SARS-CoV-2 / COVID-19 mRNA IM (Pfizer-V Wave) 04/10/2021      Health Maintenance:         Topic Date Due    MAMMOGRAM  07/15[birth year]21 (Originally 1945)    Colorectal Cancer Screening  11/19/2022    Hepatitis C Screening  Completed         Topic Date Due    DTaP,Tdap,and Td Vaccines (1 - Tdap) Never done    Pneumococcal Vaccine: 65+ Years (2 of 2 - PPSV23) 09/21/2017    COVID-19 Vaccine (2 - Pfizer 2-dose series) 05/01/2021      Medicare Health Risk Assessment:     Pulse 86   Temp 98 °F (36 7 °C) (Tympanic)   Resp 16   Ht 4' 10" (1 473 m)   Wt 80 7 kg (178 lb)   LMP  (LMP Unknown)   SpO2 99%   BMI 37 20 kg/m²      Truman is here for her Subsequent Wellness visit  Health Risk Assessment:   Patient rates overall health as poor  Patient feels that their physical health rating is slightly worse  Patient is dissatisfied with their life   Eyesight was rated as same  Hearing was rated as same  Patient feels that their emotional and mental health rating is slightly worse  Patients states they are never, rarely angry  Patient states they are sometimes unusually tired/fatigued  Pain experienced in the last 7 days has been none  Patient states that she has experienced no weight loss or gain in last 6 months  Depression Screening:   PHQ-2 Score: 2  PHQ-9 Score: 2      Fall Risk Screening: In the past year, patient has experienced: no history of falling in past year      Urinary Incontinence Screening:   Patient has not leaked urine accidently in the last six months  Home Safety:  Patient does not have trouble with stairs inside or outside of their home  Patient has working smoke alarms and has working carbon monoxide detector  Home safety hazards include: none  Nutrition:   Current diet is Regular  Medications:   Patient is currently taking over-the-counter supplements  OTC medications include: see medication list  Patient is able to manage medications  Activities of Daily Living (ADLs)/Instrumental Activities of Daily Living (IADLs):   Walk and transfer into and out of bed and chair?: Yes  Dress and groom yourself?: Yes    Bathe or shower yourself?: Yes    Feed yourself? Yes  Do your laundry/housekeeping?: Yes  Manage your money, pay your bills and track your expenses?: Yes  Make your own meals?: Yes    Do your own shopping?: Yes    Previous Hospitalizations:   Any hospitalizations or ED visits within the last 12 months?: No      Advance Care Planning:   Living will: Yes    Durable POA for healthcare:  Yes    Advanced directive: Yes      PREVENTIVE SCREENINGS      Cardiovascular Screening:    General: History Lipid Disorder      Diabetes Screening:     General: Screening Not Indicated and History Diabetes      Colorectal Cancer Screening:     General: Screening Current      Breast Cancer Screening:     General: Risks and Benefits Discussed      Cervical Cancer Screening:    General: Screening Not Indicated      Osteoporosis Screening:    General: Risks and Benefits Discussed      Abdominal Aortic Aneurysm (AAA) Screening:        General: Risks and Benefits Discussed      Lung Cancer Screening:     General: Screening Not Indicated      Hepatitis C Screening:    General: Screening Current    Screening, Brief Intervention, and Referral to Treatment (SBIRT)    Screening      Single Item Drug Screening:  How often have you used an illegal drug (including marijuana) or a prescription medication for non-medical reasons in the past year? never    Single Item Drug Screen Score: 0  Interpretation: Negative screen for possible drug use disorder      GRACIELA Martinez

## 2021-04-28 NOTE — PATIENT INSTRUCTIONS
Medicare Preventive Visit Patient Instructions  Thank you for completing your Welcome to Medicare Visit or Medicare Annual Wellness Visit today  Your next wellness visit will be due in one year (4/29/2022)  The screening/preventive services that you may require over the next 5-10 years are detailed below  Some tests may not apply to you based off risk factors and/or age  Screening tests ordered at today's visit but not completed yet may show as past due  Also, please note that scanned in results may not display below  Preventive Screenings:  Service Recommendations Previous Testing/Comments   Colorectal Cancer Screening  * Colonoscopy    * Fecal Occult Blood Test (FOBT)/Fecal Immunochemical Test (FIT)  * Fecal DNA/Cologuard Test  * Flexible Sigmoidoscopy Age: 54-65 years old   Colonoscopy: every 10 years (may be performed more frequently if at higher risk)  OR  FOBT/FIT: every 1 year  OR  Cologuard: every 3 years  OR  Sigmoidoscopy: every 5 years  Screening may be recommended earlier than age 48 if at higher risk for colorectal cancer  Also, an individualized decision between you and your healthcare provider will decide whether screening between the ages of 74-80 would be appropriate  Colonoscopy: Not on file  FOBT/FIT: Not on file  Cologuard: 11/19/2019  Sigmoidoscopy: Not on file    Screening Current     Breast Cancer Screening Age: 36 years old  Frequency: every 1-2 years  Not required if history of left and right mastectomy Mammogram: Not on file        Cervical Cancer Screening Between the ages of 21-29, pap smear recommended once every 3 years  Between the ages of 33-67, can perform pap smear with HPV co-testing every 5 years     Recommendations may differ for women with a history of total hysterectomy, cervical cancer, or abnormal pap smears in past  Pap Smear: Not on file    Screening Not Indicated   Hepatitis C Screening Once for adults born between 1945 and 1965  More frequently in patients at high risk for Hepatitis C Hep C Antibody: 02/04/2020    Screening Current   Diabetes Screening 1-2 times per year if you're at risk for diabetes or have pre-diabetes Fasting glucose: 190 mg/dL   A1C: 7 5 %    Screening Not Indicated  History Diabetes   Cholesterol Screening Once every 5 years if you don't have a lipid disorder  May order more often based on risk factors  Lipid panel: 04/12/2021    Screening Not Indicated  History Lipid Disorder     Other Preventive Screenings Covered by Medicare:  1  Abdominal Aortic Aneurysm (AAA) Screening: covered once if your at risk  You're considered to be at risk if you have a family history of AAA  2  Lung Cancer Screening: covers low dose CT scan once per year if you meet all of the following conditions: (1) Age 50-69; (2) No signs or symptoms of lung cancer; (3) Current smoker or have quit smoking within the last 15 years; (4) You have a tobacco smoking history of at least 30 pack years (packs per day multiplied by number of years you smoked); (5) You get a written order from a healthcare provider  3  Glaucoma Screening: covered annually if you're considered high risk: (1) You have diabetes OR (2) Family history of glaucoma OR (3)  aged 48 and older OR (3)  American aged 72 and older  3  Osteoporosis Screening: covered every 2 years if you meet one of the following conditions: (1) You're estrogen deficient and at risk for osteoporosis based off medical history and other findings; (2) Have a vertebral abnormality; (3) On glucocorticoid therapy for more than 3 months; (4) Have primary hyperparathyroidism; (5) On osteoporosis medications and need to assess response to drug therapy  · Last bone density test (DXA Scan): Not on file  5  HIV Screening: covered annually if you're between the age of 12-76  Also covered annually if you are younger than 13 and older than 72 with risk factors for HIV infection   For pregnant patients, it is covered up to 3 times per pregnancy  Immunizations:  Immunization Recommendations   Influenza Vaccine Annual influenza vaccination during flu season is recommended for all persons aged >= 6 months who do not have contraindications   Pneumococcal Vaccine (Prevnar and Pneumovax)  * Prevnar = PCV13  * Pneumovax = PPSV23   Adults 25-60 years old: 1-3 doses may be recommended based on certain risk factors  Adults 72 years old: Prevnar (PCV13) vaccine recommended followed by Pneumovax (PPSV23) vaccine  If already received PPSV23 since turning 65, then PCV13 recommended at least one year after PPSV23 dose  Hepatitis B Vaccine 3 dose series if at intermediate or high risk (ex: diabetes, end stage renal disease, liver disease)   Tetanus (Td) Vaccine - COST NOT COVERED BY MEDICARE PART B Following completion of primary series, a booster dose should be given every 10 years to maintain immunity against tetanus  Td may also be given as tetanus wound prophylaxis  Tdap Vaccine - COST NOT COVERED BY MEDICARE PART B Recommended at least once for all adults  For pregnant patients, recommended with each pregnancy  Shingles Vaccine (Shingrix) - COST NOT COVERED BY MEDICARE PART B  2 shot series recommended in those aged 48 and above     Health Maintenance Due:      Topic Date Due    MAMMOGRAM  07/15/2021 (Originally 1945)    Colorectal Cancer Screening  11/19/2022    Hepatitis C Screening  Completed     Immunizations Due:      Topic Date Due    DTaP,Tdap,and Td Vaccines (1 - Tdap) Never done    Pneumococcal Vaccine: 65+ Years (2 of 2 - PPSV23) 09/21/2017    COVID-19 Vaccine (2 - Pfizer 2-dose series) 05/01/2021     Advance Directives   What are advance directives? Advance directives are legal documents that state your wishes and plans for medical care  These plans are made ahead of time in case you lose your ability to make decisions for yourself   Advance directives can apply to any medical decision, such as the treatments you want, and if you want to donate organs  What are the types of advance directives? There are many types of advance directives, and each state has rules about how to use them  You may choose a combination of any of the following:  · Living will: This is a written record of the treatment you want  You can also choose which treatments you do not want, which to limit, and which to stop at a certain time  This includes surgery, medicine, IV fluid, and tube feedings  · Durable power of  for healthcare [facility]): This is a written record that states who you want to make healthcare choices for you when you are unable to make them for yourself  This person, called a proxy, is usually a family member or a friend  You may choose more than 1 proxy  · Do not resuscitate (DNR) order:  A DNR order is used in case your heart stops beating or you stop breathing  It is a request not to have certain forms of treatment, such as CPR  A DNR order may be included in other types of advance directives  · Medical directive: This covers the care that you want if you are in a coma, near death, or unable to make decisions for yourself  You can list the treatments you want for each condition  Treatment may include pain medicine, surgery, blood transfusions, dialysis, IV or tube feedings, and a ventilator (breathing machine)  · Values history: This document has questions about your views, beliefs, and how you feel and think about life  This information can help others choose the care that you would choose  Why are advance directives important? An advance directive helps you control your care  Although spoken wishes may be used, it is better to have your wishes written down  Spoken wishes can be misunderstood, or not followed  Treatments may be given even if you do not want them  An advance directive may make it easier for your family to make difficult choices about your care     Weight Management   Why it is important to manage your weight:  Being overweight increases your risk of health conditions such as heart disease, high blood pressure, type 2 diabetes, and certain types of cancer  It can also increase your risk for osteoarthritis, sleep apnea, and other respiratory problems  Aim for a slow, steady weight loss  Even a small amount of weight loss can lower your risk of health problems  How to lose weight safely:  A safe and healthy way to lose weight is to eat fewer calories and get regular exercise  You can lose up about 1 pound a week by decreasing the number of calories you eat by 500 calories each day  Healthy meal plan for weight management:  A healthy meal plan includes a variety of foods, contains fewer calories, and helps you stay healthy  A healthy meal plan includes the following:  · Eat whole-grain foods more often  A healthy meal plan should contain fiber  Fiber is the part of grains, fruits, and vegetables that is not broken down by your body  Whole-grain foods are healthy and provide extra fiber in your diet  Some examples of whole-grain foods are whole-wheat breads and pastas, oatmeal, brown rice, and bulgur  · Eat a variety of vegetables every day  Include dark, leafy greens such as spinach, kale, harrison greens, and mustard greens  Eat yellow and orange vegetables such as carrots, sweet potatoes, and winter squash  · Eat a variety of fruits every day  Choose fresh or canned fruit (canned in its own juice or light syrup) instead of juice  Fruit juice has very little or no fiber  · Eat low-fat dairy foods  Drink fat-free (skim) milk or 1% milk  Eat fat-free yogurt and low-fat cottage cheese  Try low-fat cheeses such as mozzarella and other reduced-fat cheeses  · Choose meat and other protein foods that are low in fat  Choose beans or other legumes such as split peas or lentils  Choose fish, skinless poultry (chicken or turkey), or lean cuts of red meat (beef or pork)   Before you cook meat or poultry, cut off any visible fat  · Use less fat and oil  Try baking foods instead of frying them  Add less fat, such as margarine, sour cream, regular salad dressing and mayonnaise to foods  Eat fewer high-fat foods  Some examples of high-fat foods include french fries, doughnuts, ice cream, and cakes  · Eat fewer sweets  Limit foods and drinks that are high in sugar  This includes candy, cookies, regular soda, and sweetened drinks  Exercise:  Exercise at least 30 minutes per day on most days of the week  Some examples of exercise include walking, biking, dancing, and swimming  You can also fit in more physical activity by taking the stairs instead of the elevator or parking farther away from stores  Ask your healthcare provider about the best exercise plan for you  © Copyright [employer] 2018 Information is for End User's use only and may not be sold, redistributed or otherwise used for commercial purposes  All illustrations and images included in CareNotes® are the copyrighted property of [employer]  or [employer]     1  Medicare wellness completed- Declines mammogram   2  Hypertension- At gfoal  Continue present medicaiotn  3  Hyperlipidemia- Not at goal  LDL markedly elevated  Followed by cardiology, who recommended Repatha, patient not interested at this time  4  Peripheral artery disease- Followed by Dr Kimberly Urias, vascular surgeon  5  Chronic Kidney Disease- Followed by nephrology  6  Gout- Stable on Allopurinol  7  Abnormal CT chest in the past- Followed by pulmonology  8  Diabetes- Not at goal  A1c 7 5  Medications discussed, she is not interested at this time  Limit white pasta/bread/rice/potatoes and sweets such as milkshakes and ice cream and recheck A1c in four months  If A1c remains elevated, advise initiation of medication  Follow up in four months, labs prior

## 2021-04-29 ENCOUNTER — VBI (OUTPATIENT)
Dept: ADMINISTRATIVE | Facility: OTHER | Age: 76
End: 2021-04-29

## 2021-05-01 ENCOUNTER — IMMUNIZATIONS (OUTPATIENT)
Dept: FAMILY MEDICINE CLINIC | Facility: HOSPITAL | Age: 76
End: 2021-05-01

## 2021-05-01 DIAGNOSIS — Z23 ENCOUNTER FOR IMMUNIZATION: Primary | ICD-10-CM

## 2021-05-01 PROCEDURE — 91300 SARS-COV-2 / COVID-19 MRNA VACCINE (PFIZER-BIONTECH) 30 MCG: CPT

## 2021-05-01 PROCEDURE — 0002A SARS-COV-2 / COVID-19 MRNA VACCINE (PFIZER-BIONTECH) 30 MCG: CPT

## 2021-06-04 ENCOUNTER — TELEPHONE (OUTPATIENT)
Dept: NEPHROLOGY | Facility: CLINIC | Age: 76
End: 2021-06-04

## 2021-06-04 NOTE — TELEPHONE ENCOUNTER
Spoke with the pt about doing labs prior to 06/09/21 appt  Pt's last labs are from 04/12  Pt was not eager of doing more labs  Informed pt that I will ask Dr Anthony Rogers if more labs required for her appt and will call back  Pt agreed to it  Please advise if more labs needed

## 2021-06-04 NOTE — TELEPHONE ENCOUNTER
Called pt to inform her that no more blood work is required for her next appt  Pt understood and was ok with it

## 2021-06-09 ENCOUNTER — OFFICE VISIT (OUTPATIENT)
Dept: NEPHROLOGY | Facility: CLINIC | Age: 76
End: 2021-06-09
Payer: COMMERCIAL

## 2021-06-09 VITALS
SYSTOLIC BLOOD PRESSURE: 160 MMHG | HEIGHT: 59 IN | HEART RATE: 91 BPM | RESPIRATION RATE: 16 BRPM | BODY MASS INDEX: 35.68 KG/M2 | TEMPERATURE: 97.6 F | DIASTOLIC BLOOD PRESSURE: 94 MMHG | WEIGHT: 177 LBS

## 2021-06-09 DIAGNOSIS — N18.4 HYPERTENSIVE KIDNEY DISEASE WITH STAGE 4 CHRONIC KIDNEY DISEASE (HCC): ICD-10-CM

## 2021-06-09 DIAGNOSIS — I12.9 HYPERTENSIVE KIDNEY DISEASE WITH STAGE 4 CHRONIC KIDNEY DISEASE (HCC): ICD-10-CM

## 2021-06-09 DIAGNOSIS — E55.9 VITAMIN D DEFICIENCY: ICD-10-CM

## 2021-06-09 DIAGNOSIS — E79.0 HYPERURICEMIA: ICD-10-CM

## 2021-06-09 DIAGNOSIS — N18.4 STAGE 4 CHRONIC KIDNEY DISEASE (HCC): Primary | ICD-10-CM

## 2021-06-09 PROCEDURE — 3077F SYST BP >= 140 MM HG: CPT | Performed by: INTERNAL MEDICINE

## 2021-06-09 PROCEDURE — 3080F DIAST BP >= 90 MM HG: CPT | Performed by: INTERNAL MEDICINE

## 2021-06-09 PROCEDURE — 1036F TOBACCO NON-USER: CPT | Performed by: INTERNAL MEDICINE

## 2021-06-09 PROCEDURE — 3008F BODY MASS INDEX DOCD: CPT | Performed by: INTERNAL MEDICINE

## 2021-06-09 PROCEDURE — 99214 OFFICE O/P EST MOD 30 MIN: CPT | Performed by: INTERNAL MEDICINE

## 2021-06-09 PROCEDURE — 1160F RVW MEDS BY RX/DR IN RCRD: CPT | Performed by: INTERNAL MEDICINE

## 2021-06-09 PROCEDURE — 3066F NEPHROPATHY DOC TX: CPT | Performed by: INTERNAL MEDICINE

## 2021-06-09 RX ORDER — DIPHENHYDRAMINE HCL 25 MG
25 TABLET ORAL AS NEEDED
COMMUNITY
End: 2022-01-10 | Stop reason: ALTCHOICE

## 2021-06-09 NOTE — PROGRESS NOTES
NEPHROLOGY OFFICE FOLLOW-UP  Rogers Multani 70 y o  Female YOB: 1945 MRN: [id number]    Encounter: [id number] DATE: 6/9/2021    REASON FOR VISIT: Rogers Multani is a 70 y  o female returns to Nephrology office for further management of chronic kidney disease  HPI:    This is 72-year-old female with past medical history significant for hypertension, hyperlipidemia, spinal stenosis, peripheral vascular disease, bladder cancer, returns to Nephrology office for further management of CKD stage 4  Upon review of old medical records, new baseline creatinine seems to be fluctuating around 2 1-2 2  Dialysis been discussed in the past and patient has refused dialysis even though it would be life-saving procedure  Patient was also found to have bladder cancer and now been followed by urologist-Dr Saira Mcfarlane and had underwent cystoscopy in the past     Patient has underlying hypertension and had underwent renal artery Doppler on 1/7/2020 and also found to have > 60% narrowing of right main renal artery  Left side was difficult to evaluate  Patient said that she has been checking blood pressure at home which has been fluctuating  Patient has quit smoking in the past     Patient also have lower extremity swelling  Amlodipine dose was reduced to 2 5 mg PO daily in the past and leg swelling has also improved   Patient is also currently been followed by neurologist for CVA-lacunar infarct  Patient also have underlying vitamin- D deficiency and currently also taking cholecalciferol  Patient also have underlying hyperuricemia and currently also tolerating allopurinol   Patient takes all the medications as prescribed and denies use of NSAIDs  REVIEW OF SYSTEMS:    Review of Systems   Constitutional: Negative for chills and fever  HENT: Negative for nosebleeds and sore throat  Eyes: Negative for photophobia and pain  Respiratory: Negative for choking and wheezing      Cardiovascular: Negative for chest pain and palpitations  Gastrointestinal: Negative for abdominal pain and blood in stool  Endocrine: Negative for heat intolerance  Genitourinary: Negative for flank pain and hematuria  Musculoskeletal: Negative for joint swelling and neck pain  Skin: Negative for color change and pallor  Neurological: Negative for seizures and syncope  Psychiatric/Behavioral: Negative for confusion and suicidal ideas  PAST MEDICAL HISTORY:  Past Medical History:   Diagnosis Date    Arthritis     Chronic kidney disease     Endometriosis     High cholesterol     Hypertension     Kidney disease, chronic, stage III (moderate, EGFR 30-59 ml/min) ([facility])     Lumbar disc herniation     Neuropathy     Peripheral vascular disease (HCC)     Pneumonia     Shoulder injury     left    Spinal stenosis     Stroke ([facility] [address] ) 2015    Memory loss       PAST SURGICAL HISTORY:  Past Surgical History:   Procedure Laterality Date    CARDIAC CATHETERIZATION      COLONOSCOPY      FL RETROGRADE PYELOGRAM  4/6/2020    FRACTURE SURGERY Right     ankle    OVARIAN CYST SURGERY      WA CYSTOSCOPY,INSERT URETERAL STENT Right 4/6/2020    Procedure: INSERTION STENT URETERAL;  Surgeon: Thi Eric MD;  [facility];  Service: Urology    WA CYSTOURETHROSCOPY,FULGUR 0 5-2 CM LESN N/A 4/6/2020    Procedure: TRANSURETHRAL RESECTION OF BLADDER TUMOR (TURBT);   Surgeon: Thi Eric MD;  [facility];  Service: Urology    WA CYSTOURETHROSCOPY,URETER CATHETER Bilateral 4/6/2020    Procedure: CYSTOSCOPY WITH RETROGRADE PYELOGRAM;  Surgeon: Thi Eric MD;  [facility];  Service: Urology    ROTATOR CUFF REPAIR Left     TONSILLECTOMY         SOCIAL HISTORY:  Social History     Substance and Sexual Activity   Alcohol Use No     Social History     Substance and Sexual Activity   Drug Use No     Social History     Tobacco Use   Smoking Status Former Smoker    Packs/day: 0 00    Types: Cigarettes    Quit date: 2020    Years since quittin 8   Smokeless Tobacco Never Used       FAMILY HISTORY:  Family History   Problem Relation Age of Onset    Hypertension Mother     Heart disease Mother         Valvular    Hyperlipidemia Mother     Hypertension Father     Heart defect Father         Cardiomegaly    Stroke Sister         Cerebrovascular Accident    Arthritis Brother     Other Brother         Back Disorder       ALLERGY:  Allergies   Allergen Reactions    Fenofibrate Other (See Comments)      blood in urine  hx  Kidney Failure    Colesevelam Other (See Comments)      leg pains    Colestipol Itching and Other (See Comments)      Swelling lower legs    Ezetimibe GI Intolerance    Statins Myalgia       MEDICATIONS:    Current Outpatient Medications:     allopurinol (ZYLOPRIM) 100 mg tablet, Take 1 tablet (100 mg total) by mouth daily, Disp: 90 tablet, Rfl: 2    amLODIPine (NORVASC) 2 5 mg tablet, Take 1 tablet (2 5 mg total) by mouth daily, Disp: 90 tablet, Rfl: 2    chlorthalidone 25 mg tablet, Take 1 tablet (25 mg total) by mouth daily, Disp: 90 tablet, Rfl: 2    Cholecalciferol 50 MCG (2000 UT) TABS, Take 2 tablets (4,000 Units total) by mouth daily, Disp: , Rfl:     cilostazol (PLETAL) 100 mg tablet, Take 1 tablet (100 mg total) by mouth 2 (two) times a day, Disp: 60 tablet, Rfl: 5    cloNIDine (CATAPRES-TTS-3) 0 3 mg/24 hr, Place 1 patch (0 3 mg total) on the skin once a week, Disp: 12 patch, Rfl: 2    clopidogrel (PLAVIX) 75 mg tablet, Take 1 tablet (75 mg total) by mouth daily, Disp: 90 tablet, Rfl: 1    diphenhydrAMINE (BENADRYL) 25 mg tablet, Take 25 mg by mouth as needed 2 pills as needed, Disp: , Rfl:     hydrocortisone 2 5 % cream, Apply topically 2 (two) times a day, Disp: 30 g, Rfl: 1    Icosapent Ethyl 1 g CAPS, 2 cap twice a day with food, Disp: 360 capsule, Rfl: 2    labetalol (NORMODYNE) 300 mg tablet, Take 1 tablet (300 mg total) by mouth 2 (two) times a day, Disp: 270 tablet, Rfl: 2    losartan (COZAAR) 100 MG tablet, Take 1 5 tabs daily, Disp: 135 tablet, Rfl: 1    predniSONE 10 mg tablet, Take 4 tabs po QD x 3 days then 3 tabs po QD x 3 days then 2 tabs po QD x 3 days then 1 tab po QD x 3 days then stop (Patient not taking: Reported on 6/9/2021), Disp: 30 tablet, Rfl: 0    PHYSICAL EXAM:  Vitals:    06/09/21 1114   BP: 160/94   BP Location: Left arm   Patient Position: Sitting   Cuff Size: Standard   Pulse: 91   Resp: 16   Temp: 97 6 °F (36 4 °C)   TempSrc: Temporal   Weight: 80 3 kg (177 lb)   Height: 4' 11" (1 499 m)     Body mass index is 35 75 kg/m²  Physical Exam  Constitutional:       General: She is not in acute distress  HENT:      Head: Normocephalic and atraumatic  Eyes:      General: No scleral icterus  Neck:      Musculoskeletal: Neck supple  Vascular: No JVD  Cardiovascular:      Rate and Rhythm: Normal rate  Pulmonary:      Effort: No accessory muscle usage or respiratory distress  Abdominal:      General: There is no distension  Palpations: Abdomen is soft  Musculoskeletal:      Right hand: She exhibits no laceration  Left hand: She exhibits no laceration  Skin:     General: Skin is warm  Comments: No Jaundice    Psychiatric:         Behavior: Behavior is not combative           LAB RESULTS:  Results for orders placed or performed in visit on 04/12/21   CBC and differential   Result Value Ref Range    WBC 6 16 4 31 - 10 16 Thousand/uL    RBC 4 61 3 81 - 5 12 Million/uL    Hemoglobin 13 7 11 5 - 15 4 g/dL    Hematocrit 41 1 34 8 - 46 1 %    MCV 89 82 - 98 fL    MCH 29 7 26 8 - 34 3 pg    MCHC 33 3 31 4 - 37 4 g/dL    RDW 13 4 11 6 - 15 1 %    MPV 10 1 8 9 - 12 7 fL    Platelets 829 698 - 471 Thousands/uL    nRBC 0 /100 WBCs    Neutrophils Relative 61 43 - 75 %    Immat GRANS % 1 0 - 2 %    Lymphocytes Relative 22 14 - 44 %    Monocytes Relative 10 4 - 12 %    Eosinophils Relative 5 0 - 6 % Basophils Relative 1 0 - 1 %    Neutrophils Absolute 3 78 1 85 - 7 62 Thousands/µL    Immature Grans Absolute 0 04 0 00 - 0 20 Thousand/uL    Lymphocytes Absolute 1 38 0 60 - 4 47 Thousands/µL    Monocytes Absolute 0 61 0 17 - 1 22 Thousand/µL    Eosinophils Absolute 0 31 0 00 - 0 61 Thousand/µL    Basophils Absolute 0 04 0 00 - 0 10 Thousands/µL   UA (URINE) with reflex to Scope   Result Value Ref Range    Color, UA Yellow     Clarity, UA Clear     Specific Gravity, UA 1 025 1 003 - 1 030    pH, UA 5 5 4 5, 5 0, 5 5, 6 0, 6 5, 7 0, 7 5, 8 0    Leukocytes, UA Negative Negative    Nitrite, UA Negative Negative    Protein, UA Trace (A) Negative mg/dl    Glucose, UA Negative Negative mg/dl    Ketones, UA Negative Negative mg/dl    Urobilinogen, UA 0 2 0 2, 1 0 E U /dl E U /dl    Bilirubin, UA Negative Negative    Blood, UA Negative Negative   Microalbumin / creatinine urine ratio   Result Value Ref Range    Creatinine, Ur 118 0 mg/dL    Microalbum  ,U,Random 250 0 (H) 0 0 - 20 0 mg/L    Microalb Creat Ratio 212 (H) 0 - 30 mg/g creatinine   Phosphorus   Result Value Ref Range    Phosphorus 3 3 2 3 - 4 1 mg/dL   Uric acid   Result Value Ref Range    Uric Acid 8 4 (H) 2 0 - 6 8 mg/dL   PTH, intact   Result Value Ref Range    PTH 81 3 (H) 18 4 - 80 1 pg/mL   Vitamin D 25 hydroxy   Result Value Ref Range    Vit D, 25-Hydroxy 43 1 30 0 - 100 0 ng/mL   Comprehensive metabolic panel   Result Value Ref Range    Sodium 139 136 - 145 mmol/L    Potassium 3 6 3 5 - 5 3 mmol/L    Chloride 103 100 - 108 mmol/L    CO2 24 21 - 32 mmol/L    ANION GAP 12 4 - 13 mmol/L    BUN 35 (H) 5 - 25 mg/dL    Creatinine 1 90 (H) 0 60 - 1 30 mg/dL    Glucose, Fasting 190 (H) 65 - 99 mg/dL    Calcium 9 3 8 3 - 10 1 mg/dL    AST 13 5 - 45 U/L    ALT 24 12 - 78 U/L    Alkaline Phosphatase 88 46 - 116 U/L    Total Protein 7 4 6 4 - 8 2 g/dL    Albumin 3 9 3 5 - 5 0 g/dL    Total Bilirubin 0 69 0 20 - 1 00 mg/dL    eGFR 26 ml/min/1 73sq m   HEMOGLOBIN A1C W/ EAG ESTIMATION   Result Value Ref Range    Hemoglobin A1C 7 5 (H) Normal 3 8-5 6%; PreDiabetic 5 7-6 4%; Diabetic >=6 5%; Glycemic control for adults with diabetes <7 0% %     mg/dl   Lipid panel   Result Value Ref Range    Cholesterol 317 (H) 50 - 200 mg/dL    Triglycerides 211 (H) <=150 mg/dL    HDL, Direct 46 >=40 mg/dL    LDL Calculated 229 (H) 0 - 100 mg/dL    Non-HDL-Chol (CHOL-HDL) 271 mg/dl   TSH, 3rd generation with Free T4 reflex   Result Value Ref Range    TSH 3RD GENERATON 3 856 (H) 0 358 - 3 740 uIU/mL   Urine Microscopic   Result Value Ref Range    RBC, UA None Seen None Seen, 0-1, 1-2, 2-4, 0-5 /hpf    WBC, UA None Seen None Seen, 0-1, 1-2, 0-5, 2-4 /hpf    Epithelial Cells Occasional None Seen, Occasional /hpf    Bacteria, UA Occasional None Seen, Occasional /hpf    Hyaline Casts, UA 0-1 (A) (none) /lpf   T4, free   Result Value Ref Range    Free T4 1 07 0 76 - 1 46 ng/dL     Renal ultrasound done on 10/29/2019 showed nodular density in floor of urinary bladder suspicious for cancer/malignancy  Atrophic left kidney  Renal artery Doppler done on 1/7/2020 showed > 60% narrowing/stenosis of right main renal artery  Left side was difficult to evaluate  ASSESSMENT and PLAN:  Majo Sidhu was seen today for chronic kidney disease and follow-up  Diagnoses and all orders for this visit:    Stage 4 chronic kidney disease ([address] )  -     CBC and differential; Future  -     Basic metabolic panel; Future  -     Urinalysis with microscopic; Future  -     Microalbumin / creatinine urine ratio; Future  -     Phosphorus; Future  -     Uric acid; Future  -     PTH, intact; Future  -     Vitamin D 25 hydroxy; Future    Hypertensive kidney disease with stage 4 chronic kidney disease (HCC)  -     Basic metabolic panel; Future  -     Urinalysis with microscopic; Future  -     Microalbumin / creatinine urine ratio; Future    Vitamin D deficiency  -     Vitamin D 25 hydroxy;  Future    Hyperuricemia  -     Uric acid; Future      1  CKD stage 4  Multifactorial and was suspected due to underlying hypertensive nephrosclerosis on top of atrophic left kidney and advanced age  Upon review of old medical records, new baseline creatinine seems to be fluctuating around 2 1-2 2  And in the interim renal function has remained stable with current creatinine of 1 9 with EGFR of 26   Dialysis been discussed in the past and patient has opted against dialysis even though it would be life-saving procedure  Patient was also diagnosed with bladder cancer earlier and defer further management to urologist      Management of hypertension as mentioned below   Plan to avoid NSAIDs and recheck renal function before next visit  2  Hypertension in chronic kidney disease  Today's blood pressure was slightly on the higher side  Patient also complains of left leg pain which can be the contributing factor for high blood pressure  According to the patient, her blood pressure is under well control at home and for the time being monitor hypertension with losartan 100 mg PO daily, labetalol to 300 mg PO BID and will plan to continue amlodipine 2 5 mg PO daily, chlorthalidone 25 mg PO daily and clonidine patch 0 3 mg per week  Patient was also advised to make a log of blood pressure reading for our review with the next visit  Patient had underwent renal artery Doppler on 1/7/2020 and also found to have > 60% stenosis of right main renal artery  Would recommend medical management prior to considering renal artery stenting  3  Proteinuria  Multifactorial, current urine microalbumin to creatinine ratio is 212 mg  Continue losartan 100 mg PO daily  4  Hyperuricemia  Multifactorial, suspected due to underlying chronic kidney disease on top of gout  Currently patient is taking allopurinol 100 mg PO daily   Current uric acid level is 8 4 which is better than before    Plan to continue allopurinol at current dose and recheck uric acid level with the next visit   5  Vitamin-D deficiency  Currently patient is taking cholecalciferol 4000 Units PO daily  Current vitamin-D level is 43   Calcium level is at goal   Plan to continue cholecalciferol at current dose   Plan to recheck vitamin- D level with the next visit   6  Secondary hyperparathyroidism of renal origin   Current PTH level is 81   Calcium level is at goal  Monitor PTH level without initiation of calcitriol for time being   Returns to Nephrology office in 4 months  Future labs ordered  Labs ( done on 11/22/2021 )   Creatinine 1 94 with EGFR of 25   Hemoglobin 15  1  hemoglobin A1c 7 6%   Urine analysis showed dysuria -> been prescribed nitrofurantoin by PCP   Urine microalbumin : creatinine ratio of 248 mg  Corrected calcium 10 4+ vitamin- D 52 -> decrease the dose of cholecalciferol   Normal PTH ( 65 ), uric acid ( 7 8 ), sodium, potassium, bicarb and phosphorus   Portions of the record may have been created with voice recognition software  Occasional wrong word or "sound a like" substitutions may have occurred due to the inherent limitations of voice recognition software  Read the chart carefully and recognize, using context, where substitutions have occurred  If you have any questions, please contact the dictating provider

## 2021-06-09 NOTE — LETTER
June 9, 2021     Shantel De Leon, [address] [facility] [address]  [id number]    Patient: Parul Flores   YOB: 1945   Date of Visit: 6/9/2021       Dear Dr Myrna Hu: Thank you for referring Steph Bass to me for evaluation  Below are my notes for this consultation  If you have questions, please do not hesitate to call me  I look forward to following your patient along with you  Sincerely,        Nedra Cerrato MD        CC: No Recipients  Nedra Cerrato MD  6/9/2021 11:28 AM  Sign when Signing Visit  NEPHROLOGY OFFICE FOLLOW-UP  Parul Flores 70 y o  @SEX @ YOB: 1945 MRN: [id number]    Encounter: [id number] DATE: 6/9/2021    REASON FOR VISIT: Parul Flores is a 70 y  o female returns to Nephrology office for further management of chronic kidney disease  HPI:    This is 14-year-old female with past medical history significant for hypertension, hyperlipidemia, spinal stenosis, peripheral vascular disease, bladder cancer, returns to Nephrology office for further management of CKD stage 4  Upon review of old medical records, new baseline creatinine seems to be fluctuating around 2 1-2 2  Dialysis been discussed in the past and patient has refused dialysis even though it would be life-saving procedure  Patient was also found to have bladder cancer and now been followed by urologist-Dr Bird Forde and had underwent cystoscopy in the past     Patient has underlying hypertension and had underwent renal artery Doppler on 1/7/2020 and also found to have > 60% narrowing of right main renal artery  Left side was difficult to evaluate  Patient said that she has been checking blood pressure at home which has been fluctuating  Patient has quit smoking in the past     Patient also have lower extremity swelling  Amlodipine dose was reduced to 2 5 mg PO daily in the past and leg swelling has also improved       Patient is also currently been followed by neurologist for CVA-lacunar infarct  Patient also have underlying vitamin- D deficiency and currently also taking cholecalciferol  Patient also have underlying hyperuricemia and currently also tolerating allopurinol   Patient takes all the medications as prescribed and denies use of NSAIDs  REVIEW OF SYSTEMS:    Review of Systems   Constitutional: Negative for chills and fever  HENT: Negative for nosebleeds and sore throat  Eyes: Negative for photophobia and pain  Respiratory: Negative for choking and wheezing  Cardiovascular: Negative for chest pain and palpitations  Gastrointestinal: Negative for abdominal pain and blood in stool  Endocrine: Negative for heat intolerance  Genitourinary: Negative for flank pain and hematuria  Musculoskeletal: Negative for joint swelling and neck pain  Skin: Negative for color change and pallor  Neurological: Negative for seizures and syncope  Psychiatric/Behavioral: Negative for confusion and suicidal ideas  PAST MEDICAL HISTORY:  Past Medical History:   Diagnosis Date    Arthritis     Chronic kidney disease     Endometriosis     High cholesterol     Hypertension     Kidney disease, chronic, stage III (moderate, EGFR 30-59 ml/min) (HCC)     Lumbar disc herniation     Neuropathy     [facility] vascular disease ([facility]     Pneumonia     Shoulder injury     left    Spinal stenosis     Stroke ([facility] [address] ) 2015    Memory loss       PAST SURGICAL HISTORY:  Past Surgical History:   Procedure Laterality Date    CARDIAC CATHETERIZATION      COLONOSCOPY      FL RETROGRADE PYELOGRAM  4/6/2020    FRACTURE SURGERY Right     ankle    OVARIAN CYST SURGERY      IN CYSTOSCOPY,INSERT URETERAL STENT Right 4/6/2020    Procedure: INSERTION STENT URETERAL;  Surgeon: Em Gonsalves MD;  [facility];  Service: Urology    IN CYSTOURETHROSCOPY,FULGUR 0 5-2 CM LESN N/A 4/6/2020    Procedure: TRANSURETHRAL RESECTION OF BLADDER TUMOR (TURBT);   Surgeon: Samantha Brody Lola Osuna MD;  [facility];  Service: Urology    MO CYSTOURETHROSCOPY,URETER CATHETER Bilateral 2020    Procedure: CYSTOSCOPY WITH RETROGRADE PYELOGRAM;  Surgeon: Bony Flores MD;  [facility];  Service: Urology    ROTATOR CUFF REPAIR Left     TONSILLECTOMY         SOCIAL HISTORY:  Social History     Substance and Sexual Activity   Alcohol Use No     Social History     Substance and Sexual Activity   Drug Use No     Social History     Tobacco Use   Smoking Status Former Smoker    Packs/day: 0 00    Types: Cigarettes    Quit date: 2020    Years since quittin 8   Smokeless Tobacco Never Used       FAMILY HISTORY:  Family History   Problem Relation Age of Onset    Hypertension Mother     Heart disease Mother         Valvular    Hyperlipidemia Mother     Hypertension Father     Heart defect Father         Cardiomegaly    Stroke Sister         Cerebrovascular Accident    Arthritis Brother     Other Brother         Back Disorder       ALLERGY:  Allergies   Allergen Reactions    Fenofibrate Other (See Comments)      blood in urine  hx  Kidney Failure    Colesevelam Other (See Comments)      leg pains    Colestipol Itching and Other (See Comments)      Swelling lower legs    Ezetimibe GI Intolerance    Statins Myalgia       MEDICATIONS:    Current Outpatient Medications:     allopurinol (ZYLOPRIM) 100 mg tablet, Take 1 tablet (100 mg total) by mouth daily, Disp: 90 tablet, Rfl: 2    amLODIPine (NORVASC) 2 5 mg tablet, Take 1 tablet (2 5 mg total) by mouth daily, Disp: 90 tablet, Rfl: 2    chlorthalidone 25 mg tablet, Take 1 tablet (25 mg total) by mouth daily, Disp: 90 tablet, Rfl: 2    Cholecalciferol 50 MCG (2000 UT) TABS, Take 2 tablets (4,000 Units total) by mouth daily, Disp: , Rfl:     cilostazol (PLETAL) 100 mg tablet, Take 1 tablet (100 mg total) by mouth 2 (two) times a day, Disp: 60 tablet, Rfl: 5    cloNIDine (CATAPRES-TTS-3) 0 3 mg/24 hr, Place 1 patch (0 3 mg total) on the skin once a week, Disp: 12 patch, Rfl: 2    clopidogrel (PLAVIX) 75 mg tablet, Take 1 tablet (75 mg total) by mouth daily, Disp: 90 tablet, Rfl: 1    diphenhydrAMINE (BENADRYL) 25 mg tablet, Take 25 mg by mouth as needed 2 pills as needed, Disp: , Rfl:     hydrocortisone 2 5 % cream, Apply topically 2 (two) times a day, Disp: 30 g, Rfl: 1    Icosapent Ethyl 1 g CAPS, 2 cap twice a day with food, Disp: 360 capsule, Rfl: 2    labetalol (NORMODYNE) 300 mg tablet, Take 1 tablet (300 mg total) by mouth 2 (two) times a day, Disp: 270 tablet, Rfl: 2    losartan (COZAAR) 100 MG tablet, Take 1 5 tabs daily, Disp: 135 tablet, Rfl: 1    predniSONE 10 mg tablet, Take 4 tabs po QD x 3 days then 3 tabs po QD x 3 days then 2 tabs po QD x 3 days then 1 tab po QD x 3 days then stop (Patient not taking: Reported on 6/9/2021), Disp: 30 tablet, Rfl: 0    PHYSICAL EXAM:  Vitals:    06/09/21 1114   BP: 160/94   BP Location: Left arm   Patient Position: Sitting   Cuff Size: Standard   Pulse: 91   Resp: 16   Temp: 97 6 °F (36 4 °C)   TempSrc: Temporal   Weight: 80 3 kg (177 lb)   Height: 4' 11" (1 499 m)     Body mass index is 35 75 kg/m²  Physical Exam  Constitutional:       General: She is not in acute distress  HENT:      Head: Normocephalic and atraumatic  Eyes:      General: No scleral icterus  Neck:      Musculoskeletal: Neck supple  Vascular: No JVD  Cardiovascular:      Rate and Rhythm: Normal rate  Pulmonary:      Effort: No accessory muscle usage or respiratory distress  Abdominal:      General: There is no distension  Palpations: Abdomen is soft  Musculoskeletal:      Right hand: She exhibits no laceration  Left hand: She exhibits no laceration  Skin:     General: Skin is warm  Comments: No Jaundice    Psychiatric:         Behavior: Behavior is not combative           LAB RESULTS:  Results for orders placed or performed in visit on 04/12/21   CBC and differential Result Value Ref Range    WBC 6 16 4 31 - 10 16 Thousand/uL    RBC 4 61 3 81 - 5 12 Million/uL    Hemoglobin 13 7 11 5 - 15 4 g/dL    Hematocrit 41 1 34 8 - 46 1 %    MCV 89 82 - 98 fL    MCH 29 7 26 8 - 34 3 pg    MCHC 33 3 31 4 - 37 4 g/dL    RDW 13 4 11 6 - 15 1 %    MPV 10 1 8 9 - 12 7 fL    Platelets 478 879 - 890 Thousands/uL    nRBC 0 /100 WBCs    Neutrophils Relative 61 43 - 75 %    Immat GRANS % 1 0 - 2 %    Lymphocytes Relative 22 14 - 44 %    Monocytes Relative 10 4 - 12 %    Eosinophils Relative 5 0 - 6 %    Basophils Relative 1 0 - 1 %    Neutrophils Absolute 3 78 1 85 - 7 62 Thousands/µL    Immature Grans Absolute 0 04 0 00 - 0 20 Thousand/uL    Lymphocytes Absolute 1 38 0 60 - 4 47 Thousands/µL    Monocytes Absolute 0 61 0 17 - 1 22 Thousand/µL    Eosinophils Absolute 0 31 0 00 - 0 61 Thousand/µL    Basophils Absolute 0 04 0 00 - 0 10 Thousands/µL   UA (URINE) with reflex to Scope   Result Value Ref Range    Color, UA Yellow     Clarity, UA Clear     Specific Gravity, UA 1 025 1 003 - 1 030    pH, UA 5 5 4 5, 5 0, 5 5, 6 0, 6 5, 7 0, 7 5, 8 0    Leukocytes, UA Negative Negative    Nitrite, UA Negative Negative    Protein, UA Trace (A) Negative mg/dl    Glucose, UA Negative Negative mg/dl    Ketones, UA Negative Negative mg/dl    Urobilinogen, UA 0 2 0 2, 1 0 E U /dl E U /dl    Bilirubin, UA Negative Negative    Blood, UA Negative Negative   Microalbumin / creatinine urine ratio   Result Value Ref Range    Creatinine, Ur 118 0 mg/dL    Microalbum  ,U,Random 250 0 (H) 0 0 - 20 0 mg/L    Microalb Creat Ratio 212 (H) 0 - 30 mg/g creatinine   Phosphorus   Result Value Ref Range    Phosphorus 3 3 2 3 - 4 1 mg/dL   Uric acid   Result Value Ref Range    Uric Acid 8 4 (H) 2 0 - 6 8 mg/dL   PTH, intact   Result Value Ref Range    PTH 81 3 (H) 18 4 - 80 1 pg/mL   Vitamin D 25 hydroxy   Result Value Ref Range    Vit D, 25-Hydroxy 43 1 30 0 - 100 0 ng/mL   Comprehensive metabolic panel   Result Value Ref Range Sodium 139 136 - 145 mmol/L    Potassium 3 6 3 5 - 5 3 mmol/L    Chloride 103 100 - 108 mmol/L    CO2 24 21 - 32 mmol/L    ANION GAP 12 4 - 13 mmol/L    BUN 35 (H) 5 - 25 mg/dL    Creatinine 1 90 (H) 0 60 - 1 30 mg/dL    Glucose, Fasting 190 (H) 65 - 99 mg/dL    Calcium 9 3 8 3 - 10 1 mg/dL    AST 13 5 - 45 U/L    ALT 24 12 - 78 U/L    Alkaline Phosphatase 88 46 - 116 U/L    Total Protein 7 4 6 4 - 8 2 g/dL    Albumin 3 9 3 5 - 5 0 g/dL    Total Bilirubin 0 69 0 20 - 1 00 mg/dL    eGFR 26 ml/min/1 73sq m   HEMOGLOBIN A1C W/ EAG ESTIMATION   Result Value Ref Range    Hemoglobin A1C 7 5 (H) Normal 3 8-5 6%; PreDiabetic 5 7-6 4%; Diabetic >=6 5%; Glycemic control for adults with diabetes <7 0% %     mg/dl   Lipid panel   Result Value Ref Range    Cholesterol 317 (H) 50 - 200 mg/dL    Triglycerides 211 (H) <=150 mg/dL    HDL, Direct 46 >=40 mg/dL    LDL Calculated 229 (H) 0 - 100 mg/dL    Non-HDL-Chol (CHOL-HDL) 271 mg/dl   TSH, 3rd generation with Free T4 reflex   Result Value Ref Range    TSH 3RD GENERATON 3 856 (H) 0 358 - 3 740 uIU/mL   Urine Microscopic   Result Value Ref Range    RBC, UA None Seen None Seen, 0-1, 1-2, 2-4, 0-5 /hpf    WBC, UA None Seen None Seen, 0-1, 1-2, 0-5, 2-4 /hpf    Epithelial Cells Occasional None Seen, Occasional /hpf    Bacteria, UA Occasional None Seen, Occasional /hpf    Hyaline Casts, UA 0-1 (A) (none) /lpf   T4, free   Result Value Ref Range    Free T4 1 07 0 76 - 1 46 ng/dL     Renal ultrasound done on 10/29/2019 showed nodular density in floor of urinary bladder suspicious for cancer/malignancy  Atrophic left kidney  Renal artery Doppler done on 1/7/2020 showed > 60% narrowing/stenosis of right main renal artery  Left side was difficult to evaluate  ASSESSMENT and PLAN:  Adela Collins was seen today for chronic kidney disease and follow-up      Diagnoses and all orders for this visit:    Stage 4 chronic kidney disease ([facility] [place] [address] )  -     CBC and differential; Future  -     Basic metabolic panel; Future  -     Urinalysis with microscopic; Future  -     Microalbumin / creatinine urine ratio; Future  -     Phosphorus; Future  -     Uric acid; Future  -     PTH, intact; Future  -     Vitamin D 25 hydroxy; Future    Hypertensive kidney disease with stage 4 chronic kidney disease (HCC)  -     Basic metabolic panel; Future  -     Urinalysis with microscopic; Future  -     Microalbumin / creatinine urine ratio; Future    Vitamin D deficiency  -     Vitamin D 25 hydroxy; Future    Hyperuricemia  -     Uric acid; Future      1  CKD stage 4  Multifactorial and was suspected due to underlying hypertensive nephrosclerosis on top of atrophic left kidney and advanced age  Upon review of old medical records, new baseline creatinine seems to be fluctuating around 2 1-2 2  And in the interim renal function has remained stable with current creatinine of 1 9 with EGFR of 26   Dialysis been discussed in the past and patient has opted against dialysis even though it would be life-saving procedure  Patient was also diagnosed with bladder cancer earlier and defer further management to urologist      Management of hypertension as mentioned below   Plan to avoid NSAIDs and recheck renal function before next visit  2  Hypertension in chronic kidney disease  Today's blood pressure was slightly on the higher side  Patient also complains of left leg pain which can be the contributing factor for high blood pressure  According to the patient, her blood pressure is under well control at home and for the time being monitor hypertension with losartan 100 mg PO daily, labetalol to 300 mg PO BID and will plan to continue amlodipine 2 5 mg PO daily, chlorthalidone 25 mg PO daily and clonidine patch 0 3 mg per week  Patient was also advised to make a log of blood pressure reading for our review with the next visit      Patient had underwent renal artery Doppler on 1/7/2020 and also found to have > 60% stenosis of right main renal artery  Would recommend medical management prior to considering renal artery stenting  3  Proteinuria  Multifactorial, current urine microalbumin to creatinine ratio is 212 mg  Continue losartan 100 mg PO daily  4  Hyperuricemia  Multifactorial, suspected due to underlying chronic kidney disease on top of gout  Currently patient is taking allopurinol 100 mg PO daily   Current uric acid level is 8 4 which is better than before   Plan to continue allopurinol at current dose and recheck uric acid level with the next visit   5  Vitamin-D deficiency  Currently patient is taking cholecalciferol 4000 Units PO daily  Current vitamin-D level is 43   Calcium level is at goal   Plan to continue cholecalciferol at current dose   Plan to recheck vitamin- D level with the next visit   6  Secondary hyperparathyroidism of renal origin   Current PTH level is 81   Calcium level is at goal  Monitor PTH level without initiation of calcitriol for time being   Returns to Nephrology office in 4 months  Future labs ordered  Portions of the record may have been created with voice recognition software  Occasional wrong word or "sound a like" substitutions may have occurred due to the inherent limitations of voice recognition software  Read the chart carefully and recognize, using context, where substitutions have occurred  If you have any questions, please contact the dictating provider

## 2021-06-11 ENCOUNTER — VBI (OUTPATIENT)
Dept: ADMINISTRATIVE | Facility: OTHER | Age: 76
End: 2021-06-11

## 2021-07-09 DIAGNOSIS — M10.272 ACUTE DRUG-INDUCED GOUT OF LEFT FOOT: ICD-10-CM

## 2021-07-09 RX ORDER — LABETALOL 300 MG/1
TABLET, FILM COATED ORAL
Qty: 180 TABLET | Refills: 4 | Status: SHIPPED | OUTPATIENT
Start: 2021-07-09

## 2021-07-12 DIAGNOSIS — I10 HYPERTENSION, UNSPECIFIED TYPE: ICD-10-CM

## 2021-07-12 DIAGNOSIS — E78.5 HYPERLIPIDEMIA ASSOCIATED WITH TYPE 2 DIABETES MELLITUS (HCC): ICD-10-CM

## 2021-07-12 DIAGNOSIS — E11.59 HYPERTENSION ASSOCIATED WITH DIABETES (HCC): ICD-10-CM

## 2021-07-12 DIAGNOSIS — I65.1 BASILAR ARTERY STENOSIS: ICD-10-CM

## 2021-07-12 DIAGNOSIS — Z86.73 HISTORY OF CVA (CEREBROVASCULAR ACCIDENT): ICD-10-CM

## 2021-07-12 DIAGNOSIS — R21 RASH: ICD-10-CM

## 2021-07-12 DIAGNOSIS — E11.69 HYPERLIPIDEMIA ASSOCIATED WITH TYPE 2 DIABETES MELLITUS (HCC): ICD-10-CM

## 2021-07-12 DIAGNOSIS — I73.9 CLAUDICATION IN PERIPHERAL VASCULAR DISEASE (HCC): ICD-10-CM

## 2021-07-12 DIAGNOSIS — R60.9 DEPENDENT EDEMA: ICD-10-CM

## 2021-07-12 DIAGNOSIS — I15.2 HYPERTENSION ASSOCIATED WITH DIABETES (HCC): ICD-10-CM

## 2021-07-12 DIAGNOSIS — I70.0 AORTOILIAC STENOSIS, LEFT (HCC): ICD-10-CM

## 2021-07-12 RX ORDER — CLOPIDOGREL BISULFATE 75 MG/1
TABLET ORAL
Qty: 90 TABLET | Refills: 1 | Status: SHIPPED | OUTPATIENT
Start: 2021-07-12 | End: 2022-01-04

## 2021-07-13 ENCOUNTER — VBI (OUTPATIENT)
Dept: ADMINISTRATIVE | Facility: OTHER | Age: 76
End: 2021-07-13

## 2021-07-15 ENCOUNTER — HOSPITAL ENCOUNTER (EMERGENCY)
Facility: HOSPITAL | Age: 76
Discharge: HOME | End: 2021-07-15
Attending: EMERGENCY MEDICINE | Admitting: EMERGENCY MEDICINE
Payer: COMMERCIAL

## 2021-07-15 ENCOUNTER — APPOINTMENT (EMERGENCY)
Dept: CT IMAGING | Facility: HOSPITAL | Age: 76
End: 2021-07-15
Payer: COMMERCIAL

## 2021-07-15 VITALS
HEART RATE: 74 BPM | SYSTOLIC BLOOD PRESSURE: 179 MMHG | TEMPERATURE: 98.1 F | BODY MASS INDEX: 35.76 KG/M2 | RESPIRATION RATE: 17 BRPM | OXYGEN SATURATION: 96 % | WEIGHT: 177.03 LBS | DIASTOLIC BLOOD PRESSURE: 79 MMHG

## 2021-07-15 DIAGNOSIS — R22.31 LOCALIZED SWELLING ON RIGHT HAND: Primary | ICD-10-CM

## 2021-07-15 LAB
ALBUMIN SERPL BCP-MCNC: 3.6 G/DL (ref 3.5–5)
ALP SERPL-CCNC: 95 U/L (ref 46–116)
ALT SERPL W P-5'-P-CCNC: 17 U/L (ref 12–78)
ANION GAP SERPL CALCULATED.3IONS-SCNC: 13 MMOL/L (ref 4–13)
AST SERPL W P-5'-P-CCNC: 10 U/L (ref 5–45)
BASOPHILS # BLD AUTO: 0.04 THOUSANDS/ΜL (ref 0–0.1)
BASOPHILS NFR BLD AUTO: 0 % (ref 0–1)
BILIRUB SERPL-MCNC: 0.75 MG/DL (ref 0.2–1)
BUN SERPL-MCNC: 55 MG/DL (ref 5–25)
CALCIUM SERPL-MCNC: 9.9 MG/DL (ref 8.3–10.1)
CHLORIDE SERPL-SCNC: 102 MMOL/L (ref 100–108)
CO2 SERPL-SCNC: 25 MMOL/L (ref 21–32)
CREAT SERPL-MCNC: 2.45 MG/DL (ref 0.6–1.3)
EOSINOPHIL # BLD AUTO: 0.14 THOUSAND/ΜL (ref 0–0.61)
EOSINOPHIL NFR BLD AUTO: 1 % (ref 0–6)
ERYTHROCYTE [DISTWIDTH] IN BLOOD BY AUTOMATED COUNT: 13.5 % (ref 11.6–15.1)
GFR SERPL CREATININE-BSD FRML MDRD: 19 ML/MIN/1.73SQ M
GLUCOSE SERPL-MCNC: 334 MG/DL (ref 65–140)
HCT VFR BLD AUTO: 38.8 % (ref 34.8–46.1)
HGB BLD-MCNC: 13.1 G/DL (ref 11.5–15.4)
IMM GRANULOCYTES # BLD AUTO: 0.08 THOUSAND/UL (ref 0–0.2)
IMM GRANULOCYTES NFR BLD AUTO: 1 % (ref 0–2)
LACTATE SERPL-SCNC: 1.9 MMOL/L (ref 0.5–2)
LYMPHOCYTES # BLD AUTO: 1.09 THOUSANDS/ΜL (ref 0.6–4.47)
LYMPHOCYTES NFR BLD AUTO: 11 % (ref 14–44)
MCH RBC QN AUTO: 29.9 PG (ref 26.8–34.3)
MCHC RBC AUTO-ENTMCNC: 33.8 G/DL (ref 31.4–37.4)
MCV RBC AUTO: 89 FL (ref 82–98)
MONOCYTES # BLD AUTO: 0.93 THOUSAND/ΜL (ref 0.17–1.22)
MONOCYTES NFR BLD AUTO: 9 % (ref 4–12)
NEUTROPHILS # BLD AUTO: 7.94 THOUSANDS/ΜL (ref 1.85–7.62)
NEUTS SEG NFR BLD AUTO: 78 % (ref 43–75)
NRBC BLD AUTO-RTO: 0 /100 WBCS
PLATELET # BLD AUTO: 210 THOUSANDS/UL (ref 149–390)
PMV BLD AUTO: 10.5 FL (ref 8.9–12.7)
POTASSIUM SERPL-SCNC: 3.8 MMOL/L (ref 3.5–5.3)
PROT SERPL-MCNC: 7.8 G/DL (ref 6.4–8.2)
RBC # BLD AUTO: 4.38 MILLION/UL (ref 3.81–5.12)
SODIUM SERPL-SCNC: 140 MMOL/L (ref 136–145)
URATE SERPL-MCNC: 8.8 MG/DL (ref 2–6.8)
WBC # BLD AUTO: 10.22 THOUSAND/UL (ref 4.31–10.16)

## 2021-07-15 PROCEDURE — 99284 EMERGENCY DEPT VISIT MOD MDM: CPT

## 2021-07-15 PROCEDURE — 80053 COMPREHEN METABOLIC PANEL: CPT | Performed by: PHYSICIAN ASSISTANT

## 2021-07-15 PROCEDURE — 83605 ASSAY OF LACTIC ACID: CPT | Performed by: PHYSICIAN ASSISTANT

## 2021-07-15 PROCEDURE — 84550 ASSAY OF BLOOD/URIC ACID: CPT | Performed by: PHYSICIAN ASSISTANT

## 2021-07-15 PROCEDURE — 85025 COMPLETE CBC W/AUTO DIFF WBC: CPT | Performed by: PHYSICIAN ASSISTANT

## 2021-07-15 PROCEDURE — 36415 COLL VENOUS BLD VENIPUNCTURE: CPT | Performed by: PHYSICIAN ASSISTANT

## 2021-07-15 PROCEDURE — 99284 EMERGENCY DEPT VISIT MOD MDM: CPT | Performed by: PHYSICIAN ASSISTANT

## 2021-07-15 PROCEDURE — 73200 CT UPPER EXTREMITY W/O DYE: CPT

## 2021-07-15 RX ORDER — PREDNISONE 20 MG/1
40 TABLET ORAL DAILY
Qty: 8 TABLET | Refills: 0 | Status: SHIPPED | OUTPATIENT
Start: 2021-07-16 | End: 2021-07-20

## 2021-07-15 RX ORDER — CEPHALEXIN 250 MG/1
500 CAPSULE ORAL ONCE
Status: COMPLETED | OUTPATIENT
Start: 2021-07-15 | End: 2021-07-15

## 2021-07-15 RX ORDER — CEPHALEXIN 500 MG/1
500 CAPSULE ORAL EVERY 6 HOURS SCHEDULED
Qty: 27 CAPSULE | Refills: 0 | Status: SHIPPED | OUTPATIENT
Start: 2021-07-15 | End: 2021-07-22

## 2021-07-15 RX ORDER — PREDNISONE 20 MG/1
40 TABLET ORAL ONCE
Status: COMPLETED | OUTPATIENT
Start: 2021-07-15 | End: 2021-07-15

## 2021-07-15 RX ADMIN — PREDNISONE 40 MG: 20 TABLET ORAL at 09:32

## 2021-07-15 RX ADMIN — CEPHALEXIN 500 MG: 250 CAPSULE ORAL at 09:32

## 2021-07-15 NOTE — ED PROVIDER NOTES
History  Chief Complaint   Patient presents with    Wrist Pain     pt c/o right wrist pain for the past few days  denies any acute injury but believes that she may have twisted it whilevusing her cane     68yo female with a history of CKD, hypertension, hyperlipidemia, and peripheral vascular disease presenting for evaluation of right wrist pain over the past several days  She denies any known trauma to the area  Pain started shortly after gardening outside but she does not believe that she had any poison ivy exposure  Patient reports pain in the dorsum of the right hand with associated erythema and swelling  Patient has a history of gout but has only ever had episodes involving her great toe  Patient has been wrapping her hand in Ace wrap and applying ice for comfort  Her swelling has gradually been worsening and she decided to seek medical attention  She is otherwise asymptomatic and denies any fevers, chills, paresthesias, weakness  No prior history of MRSA  History provided by:  Patient   used: No    Hand Pain  Location:  Dorsum of right hand  Quality:  Throbbing  Severity:  Moderate  Onset quality:  Gradual  Timing:  Constant  Progression:  Worsening  Chronicity:  New  Context:  No trauma  Started after gardening  Hx of gout  Relieved by:  Nothing  Worsened by: Movement  Associated symptoms: no abdominal pain, no chest pain, no diarrhea, no fatigue, no fever, no loss of consciousness, no nausea, no rash, no shortness of breath, no sore throat and no vomiting        Prior to Admission Medications   Prescriptions Last Dose Informant Patient Reported? Taking?    Cholecalciferol 50 MCG (2000 UT) TABS  Self No No   Sig: Take 2 tablets (4,000 Units total) by mouth daily   Icosapent Ethyl 1 g CAPS  Self No No   Si cap twice a day with food   allopurinol (ZYLOPRIM) 100 mg tablet  Self No No   Sig: Take 1 tablet (100 mg total) by mouth daily   amLODIPine (NORVASC) 2 5 mg tablet  Self No No Sig: Take 1 tablet (2 5 mg total) by mouth daily   chlorthalidone 25 mg tablet  Self No No   Sig: Take 1 tablet (25 mg total) by mouth daily   cilostazol (PLETAL) 100 mg tablet  Self No No   Sig: Take 1 tablet (100 mg total) by mouth 2 (two) times a day   cloNIDine (CATAPRES-TTS-3) 0 3 mg/24 hr  Self No No   Sig: Place 1 patch (0 3 mg total) on the skin once a week   clopidogrel (PLAVIX) 75 mg tablet   No No   Sig: TAKE 1 TABLET BY MOUTH EVERY DAY   diphenhydrAMINE (BENADRYL) 25 mg tablet  Self Yes No   Sig: Take 25 mg by mouth as needed 2 pills as needed   hydrocortisone 2 5 % cream  Self No No   Sig: Apply topically 2 (two) times a day   labetalol (NORMODYNE) 300 mg tablet   No No   Sig: TAKE 1 TABLET BY MOUTH TWICE A DAY   losartan (COZAAR) 100 MG tablet  Self No No   Sig: Take 1 5 tabs daily   predniSONE 10 mg tablet  Self No No   Sig: Take 4 tabs po QD x 3 days then 3 tabs po QD x 3 days then 2 tabs po QD x 3 days then 1 tab po QD x 3 days then stop   Patient not taking: Reported on 6/9/2021      Facility-Administered Medications: None       Past Medical History:   Diagnosis Date    Arthritis     Chronic kidney disease     Endometriosis     High cholesterol     Hypertension     Kidney disease, chronic, stage III (moderate, EGFR 30-59 ml/min) ([facility])     Lumbar disc herniation     Neuropathy     Peripheral vascular disease ([facility])     Pneumonia     Shoulder injury     left    Spinal stenosis     Stroke ([facility] [address] ) 2015    Memory loss       Past Surgical History:   Procedure Laterality Date    CARDIAC CATHETERIZATION      COLONOSCOPY      FL RETROGRADE PYELOGRAM  4/6/2020    FRACTURE SURGERY Right     ankle    OVARIAN CYST SURGERY      MT CYSTOSCOPY,INSERT URETERAL STENT Right 4/6/2020    Procedure: INSERTION STENT URETERAL;  Surgeon: Gold Patterson MD;  [facility];  Service: Urology    MT CYSTOURETHROSCOPY,FULGUR 0 5-2 CM AYSE N/A 4/6/2020    Procedure: TRANSURETHRAL RESECTION OF BLADDER TUMOR (TURBT); Surgeon: Yamile Menard MD;  [facility];  Service: Urology    NH CYSTOURETHROSCOPY,URETER CATHETER Bilateral 2020    Procedure: CYSTOSCOPY WITH RETROGRADE PYELOGRAM;  Surgeon: Yamile Menard MD;  [facility];  Service: Urology    ROTATOR CUFF REPAIR Left     TONSILLECTOMY         Family History   Problem Relation Age of Onset    Hypertension Mother     Heart disease Mother         Valvular    Hyperlipidemia Mother     Hypertension Father     Heart defect Father         Cardiomegaly    Stroke Sister         Cerebrovascular Accident    Arthritis Brother     Other Brother         Back Disorder     I have reviewed and agree with the history as documented  E-Cigarette/Vaping    E-Cigarette Use Never User      E-Cigarette/Vaping Substances    Nicotine No     THC No     CBD No     Flavoring No     Other No     Unknown No      Social History     Tobacco Use    Smoking status: Former Smoker     Packs/day: 0 00     Types: Cigarettes     Quit date: 2020     Years since quittin 9    Smokeless tobacco: Never Used   Vaping Use    Vaping Use: Never used   Substance Use Topics    Alcohol use: No    Drug use: No       Review of Systems   Constitutional: Negative for chills, fatigue and fever  HENT: Negative for drooling, sore throat and voice change  Eyes: Negative for discharge and redness  Respiratory: Negative for shortness of breath and stridor  Cardiovascular: Negative for chest pain and leg swelling  Gastrointestinal: Negative for abdominal pain, diarrhea, nausea and vomiting  Musculoskeletal: Positive for arthralgias and joint swelling  Negative for neck pain and neck stiffness  Skin: Positive for color change  Negative for pallor, rash and wound  Neurological: Negative for seizures, loss of consciousness and syncope  Psychiatric/Behavioral: Negative for confusion  The patient is not nervous/anxious      All other systems reviewed and are negative  Physical Exam  Physical Exam  Vitals and nursing note reviewed  Constitutional:       General: She is not in acute distress  Appearance: She is well-developed  She is not diaphoretic  Comments: Non-toxic appearing   HENT:      Head: Normocephalic and atraumatic  Right Ear: External ear normal       Left Ear: External ear normal       Nose: Nose normal    Eyes:      General: No scleral icterus  Right eye: No discharge  Left eye: No discharge  Conjunctiva/sclera: Conjunctivae normal    Cardiovascular:      Rate and Rhythm: Normal rate and regular rhythm  Heart sounds: Normal heart sounds  No murmur heard  Pulmonary:      Effort: Pulmonary effort is normal  No respiratory distress  Breath sounds: Normal breath sounds  No stridor  No wheezing or rales  Musculoskeletal:         General: No deformity  Right hand: Swelling and tenderness present  Decreased range of motion  Cervical back: Normal range of motion and neck supple  Comments: Right hand: There is a moderate amount of erythema, swelling, and warmth at the dorsum of the hand  No open wounds  She has significant pain with palpation and ROM  There is some swelling present to the 4th PIP joint as well  No fusiform swelling of digit  No pain with passive extension  Lymphadenopathy:      Cervical: No cervical adenopathy  Skin:     General: Skin is warm and dry  Neurological:      General: No focal deficit present  Mental Status: She is alert  She is not disoriented  GCS: GCS eye subscore is 4  GCS verbal subscore is 5  GCS motor subscore is 6     Psychiatric:         Behavior: Behavior normal          Vital Signs  ED Triage Vitals [07/15/21 0734]   Temperature Pulse Respirations Blood Pressure SpO2   98 1 °F (36 7 °C) 80 19 (!) 181/76 97 %      Temp Source Heart Rate Source Patient Position - Orthostatic VS BP Location FiO2 (%)   Oral Monitor Sitting Left arm -- Pain Score       Worst Possible Pain           Vitals:    07/15/21 0734 07/15/21 0932   BP: (!) 181/76 (!) 179/79   Pulse: 80 74   Patient Position - Orthostatic VS: Sitting Sitting         Visual Acuity      ED Medications  Medications   predniSONE tablet 40 mg (40 mg Oral Given 7/15/21 0932)   cephalexin (KEFLEX) capsule 500 mg (500 mg Oral Given 7/15/21 0932)       Diagnostic Studies  Results Reviewed     Procedure Component Value Units Date/Time    Lactic acid [[id number]]  (Normal) Collected: 07/15/21 0825    Lab Status: Final result Specimen: Blood from Arm, Left Updated: 07/15/21 0908     LACTIC ACID 1 9 mmol/L     Narrative:      Result may be elevated if tourniquet was used during collection      Comprehensive metabolic panel [[id number]]  (Abnormal) Collected: 07/15/21 0825    Lab Status: Final result Specimen: Blood from Arm, Left Updated: 07/15/21 0859     Sodium 140 mmol/L      Potassium 3 8 mmol/L      Chloride 102 mmol/L      CO2 25 mmol/L      ANION GAP 13 mmol/L      BUN 55 mg/dL      Creatinine 2 45 mg/dL      Glucose 334 mg/dL      Calcium 9 9 mg/dL      AST 10 U/L      ALT 17 U/L      Alkaline Phosphatase 95 U/L      Total Protein 7 8 g/dL      Albumin 3 6 g/dL      Total Bilirubin 0 75 mg/dL      eGFR 19 ml/min/1 73sq m     Narrative:      National Kidney Disease Foundation guidelines for Chronic Kidney Disease (CKD):     Stage 1 with normal or high GFR (GFR > 90 mL/min/1 73 square meters)    Stage 2 Mild CKD (GFR = 60-89 mL/min/1 73 square meters)    Stage 3A Moderate CKD (GFR = 45-59 mL/min/1 73 square meters)    Stage 3B Moderate CKD (GFR = 30-44 mL/min/1 73 square meters)    Stage 4 Severe CKD (GFR = 15-29 mL/min/1 73 square meters)    Stage 5 End Stage CKD (GFR <15 mL/min/1 73 square meters)  Note: GFR calculation is accurate only with a steady state creatinine    Uric acid [[id number]]  (Abnormal) Collected: 07/15/21 0825    Lab Status: Final result Specimen: Blood from Arm, Left Updated: 07/15/21 0853     Uric Acid 8 8 mg/dL     CBC and differential [[id number]]  (Abnormal) Collected: 07/15/21 0825    Lab Status: Final result Specimen: Blood from Arm, Left Updated: 07/15/21 0832     WBC 10 22 Thousand/uL      RBC 4 38 Million/uL      Hemoglobin 13 1 g/dL      Hematocrit 38 8 %      MCV 89 fL      MCH 29 9 pg      MCHC 33 8 g/dL      RDW 13 5 %      MPV 10 5 fL      Platelets 859 Thousands/uL      nRBC 0 /100 WBCs      Neutrophils Relative 78 %      Immat GRANS % 1 %      Lymphocytes Relative 11 %      Monocytes Relative 9 %      Eosinophils Relative 1 %      Basophils Relative 0 %      Neutrophils Absolute 7 94 Thousands/µL      Immature Grans Absolute 0 08 Thousand/uL      Lymphocytes Absolute 1 09 Thousands/µL      Monocytes Absolute 0 93 Thousand/µL      Eosinophils Absolute 0 14 Thousand/µL      Basophils Absolute 0 04 Thousands/µL                  CT upper extremity wo contrast right   Final Result by Nuvia Neff MD (07/15 0845)      1  No evidence of deep infection on this unenhanced CT  No evidence of osteomyelitis  2   Degenerative changes throughout the wrist and fingers  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 Yes Filed at: 07/15/2021 0739   Initial Alcohol Screen: US AUDIT-C    1  How often do you have a drink containing alcohol?   1 Filed at: 07/15/2021 0739   2  How many drinks containing alcohol do you have on a typical day you are drinking? 0 Filed at: 07/15/2021 0739   3b  FEMALE Any Age, or MALE 65+: How often do you have 4 or more drinks on one occassion? 0 Filed at: 07/15/2021 0739   Audit-C Score  1 Filed at: 07/15/2021 4158   DALE: How many times in the past year have you    Used an illegal drug or used a prescription medication for non-medical reasons? Never Filed at: 07/15/2021 0739                    MDM  Number of Diagnoses or Management Options  Localized swelling on right hand: new and requires workup  Diagnosis management comments: 70yo female presenting for right hand pain and swelling for the past several days  Started after gardening  No known trauma  No f/c  Patient is afebrile and hemodynamically stable  On exam, she has a moderate amount of erythema/warmth/swelling to the dorsum of the hand and the 4th PIP joint  No fusiform swelling of digit or pain with passive extension  No clinical signs of flexor tenosynovitis  Patient has significant tenderness with ROM and palpation  Differential diagnosis includes but is not limited to: cellulitis, gout, arthritis, fracture, osteomyelitis, deep infection    Initial ED plan: Check CBC, CMP, uric acid, lactate, and CT upper extremity  Unable to perform contrast study given underlying CKD  Final assessment: Labs reveal a very mild leukocytosis with a WBC of 10 2  Lactate normal, no signs of sepsis  Uric acid level elevated  Creatinine 2 45 which is slightly above her baseline of 2 1-2 2  CT upper extremity is negative for osteomyelitis or evidence of deep infection  No fractures  There is evidence of degenerative changes consistent with CPPD arthropathy  No indication for admission at this time  Difficult to ascertain whether erythema/swelling represents gout or cellulitis  Will cover with both Keflex and prednisone  Advised close PCP and hand surgery follow-up   Strict ED return precautions discussed including fevers, chills, spreading erythema  Patient expressed understanding and is agreeable to plan  Patient discharged in stable condition  Amount and/or Complexity of Data Reviewed  Clinical lab tests: ordered and reviewed  Tests in the radiology section of CPT®: ordered and reviewed  Review and summarize past medical records: yes  Independent visualization of images, tracings, or specimens: yes    Risk of Complications, Morbidity, and/or Mortality  Presenting problems: moderate  Diagnostic procedures: moderate  Management options: moderate    Patient Progress  Patient progress: stable      Disposition  Final diagnoses:   Localized swelling on right hand     Time reflects when diagnosis was documented in both MDM as applicable and the Disposition within this note     Time User Action Codes Description Comment    7/15/2021  9:29 AM Scarlett Piper Add [R22 31] Localized swelling on right hand       ED Disposition     ED Disposition Condition Date/Time Comment    Discharge Stable u Jul 15, 2021  9:29 AM Shellye List discharge to home/self care              Follow-up Information     Follow up With Specialties Details Why Contact Info Additional [address], 10 [facility] Internal Medicine, Nurse Practitioner Schedule an appointment as soon as possible for a visit   [facility] [phone]       Lawyer Manuel MD Orthopedic Surgery, Hand Surgery, Orthopedics Schedule an appointment as soon as possible for a visit   [address] [place] [address] [phone]       [address] [facility] Emergency Department Emergency Medicine  If symptoms worsen 34 [facility] 109 [facility] Emergency Department, [address] [place] [address], [id number]          Discharge Medication List as of 7/15/2021  9:34 AM      START taking these medications    Details   cephalexin (KEFLEX) 500 mg capsule Take 1 capsule (500 mg total) by mouth every 6 (six) hours for 7 days, Starting Thu 7/15/2021, Until Thu 7/22/2021, Normal      !! predniSONE 20 mg tablet Take 2 tablets (40 mg total) by mouth daily for 4 days, Starting Fri 7/16/2021, Until Tue 7/20/2021, Normal       !! - Potential duplicate medications found  Please discuss with provider  CONTINUE these medications which have NOT CHANGED    Details   allopurinol (ZYLOPRIM) 100 mg tablet Take 1 tablet (100 mg total) by mouth daily, Starting Tue 11/3/2020, Until Wed 6/9/2021, Normal      amLODIPine (NORVASC) 2 5 mg tablet Take 1 tablet (2 5 mg total) by mouth daily, Starting Wed 1/20/2021, Until Sat 1/15/2022, Normal      chlorthalidone 25 mg tablet Take 1 tablet (25 mg total) by mouth daily, Starting Wed 1/20/2021, Normal      Cholecalciferol 50 MCG (2000 UT) TABS Take 2 tablets (4,000 Units total) by mouth daily, Starting Tue 1/12/2021, No Print      cilostazol (PLETAL) 100 mg tablet Take 1 tablet (100 mg total) by mouth 2 (two) times a day, Starting Wed 1/20/2021, Normal      cloNIDine (CATAPRES-TTS-3) 0 3 mg/24 hr Place 1 patch (0 3 mg total) on the skin once a week, Starting Wed 1/20/2021, Normal      clopidogrel (PLAVIX) 75 mg tablet TAKE 1 TABLET BY MOUTH EVERY DAY, Normal      diphenhydrAMINE (BENADRYL) 25 mg tablet Take 25 mg by mouth as needed 2 pills as needed, Historical Med      hydrocortisone 2 5 % cream Apply topically 2 (two) times a day, Starting Wed 1/20/2021, Normal      Icosapent Ethyl 1 g CAPS 2 cap twice a day with food, Normal      labetalol (NORMODYNE) 300 mg tablet TAKE 1 TABLET BY MOUTH TWICE A DAY, Normal      losartan (COZAAR) 100 MG tablet Take 1 5 tabs daily, Normal      !! predniSONE 10 mg tablet Take 4 tabs po QD x 3 days then 3 tabs po QD x 3 days then 2 tabs po QD x 3 days then 1 tab po QD x 3 days then stop, Normal       !! - Potential duplicate medications found  Please discuss with provider          No discharge procedures on file      PDMP Review       Value Time User    PDMP Reviewed  Yes 4/6/2020  8:10 AM Kelin Soto MD          ED Provider  Electronically Signed by           Dima Best PA-C  07/16/21 2173

## 2021-07-15 NOTE — DISCHARGE INSTRUCTIONS
Take antibiotic and prednisone as prescribed  Take Tylenol 650mg every 6 hours as needed for pain  Elevate your arm and apply ice  Please follow-up with your family doctor in the next 1-2 days  You should also follow-up with the hand specialist next week  Return to the ER with any worsening symptoms, spreading redness, fevers, chills

## 2021-07-16 ENCOUNTER — TELEPHONE (OUTPATIENT)
Dept: FAMILY MEDICINE CLINIC | Facility: CLINIC | Age: 76
End: 2021-07-16

## 2021-07-19 ENCOUNTER — OFFICE VISIT (OUTPATIENT)
Dept: INTERNAL MEDICINE CLINIC | Facility: CLINIC | Age: 76
End: 2021-07-19
Payer: COMMERCIAL

## 2021-07-19 VITALS
BODY MASS INDEX: 35.28 KG/M2 | WEIGHT: 175 LBS | DIASTOLIC BLOOD PRESSURE: 74 MMHG | TEMPERATURE: 97.8 F | HEART RATE: 84 BPM | SYSTOLIC BLOOD PRESSURE: 132 MMHG | OXYGEN SATURATION: 97 % | HEIGHT: 59 IN

## 2021-07-19 DIAGNOSIS — E79.0 HYPERURICEMIA: ICD-10-CM

## 2021-07-19 DIAGNOSIS — M79.641 RIGHT HAND PAIN: Primary | ICD-10-CM

## 2021-07-19 PROCEDURE — 3066F NEPHROPATHY DOC TX: CPT | Performed by: NURSE PRACTITIONER

## 2021-07-19 PROCEDURE — 1036F TOBACCO NON-USER: CPT | Performed by: NURSE PRACTITIONER

## 2021-07-19 PROCEDURE — 1160F RVW MEDS BY RX/DR IN RCRD: CPT | Performed by: NURSE PRACTITIONER

## 2021-07-19 PROCEDURE — 99213 OFFICE O/P EST LOW 20 MIN: CPT | Performed by: NURSE PRACTITIONER

## 2021-07-19 PROCEDURE — 3078F DIAST BP <80 MM HG: CPT | Performed by: NURSE PRACTITIONER

## 2021-07-19 PROCEDURE — 3008F BODY MASS INDEX DOCD: CPT | Performed by: NURSE PRACTITIONER

## 2021-07-19 PROCEDURE — 3075F SYST BP GE 130 - 139MM HG: CPT | Performed by: NURSE PRACTITIONER

## 2021-07-19 RX ORDER — ALLOPURINOL 100 MG/1
TABLET ORAL
Qty: 90 TABLET | Refills: 2 | Status: SHIPPED | OUTPATIENT
Start: 2021-07-19 | End: 2022-04-22

## 2021-07-19 NOTE — PROGRESS NOTES
Assessment/Plan:    Right hand swelling- Right ring finger swollen, improved per patient  Pain has resolved  Continue antibiotic  If pain returns, will refer to hand specialist     Follow up in September as scheduled  Diagnoses and all orders for this visit:    Right hand pain        The patient was counseled regarding instructions for management, risk factor reductions, patient and family education,impressions, risks and benefits of treatment options, side effects of medications, importance of compliance with treatment  The treatment plan was reviewed with the patient/guardian and patient/guardian understands and agrees with the treatment plan              Current Outpatient Medications:     allopurinol (ZYLOPRIM) 100 mg tablet, TAKE 1 TABLET BY MOUTH EVERY DAY, Disp: 90 tablet, Rfl: 2    amLODIPine (NORVASC) 2 5 mg tablet, Take 1 tablet (2 5 mg total) by mouth daily, Disp: 90 tablet, Rfl: 2    cephalexin (KEFLEX) 500 mg capsule, Take 1 capsule (500 mg total) by mouth every 6 (six) hours for 7 days, Disp: 27 capsule, Rfl: 0    chlorthalidone 25 mg tablet, Take 1 tablet (25 mg total) by mouth daily, Disp: 90 tablet, Rfl: 2    Cholecalciferol 50 MCG (2000 UT) TABS, Take 2 tablets (4,000 Units total) by mouth daily, Disp: , Rfl:     cilostazol (PLETAL) 100 mg tablet, Take 1 tablet (100 mg total) by mouth 2 (two) times a day, Disp: 60 tablet, Rfl: 5    cloNIDine (CATAPRES-TTS-3) 0 3 mg/24 hr, Place 1 patch (0 3 mg total) on the skin once a week, Disp: 12 patch, Rfl: 2    clopidogrel (PLAVIX) 75 mg tablet, TAKE 1 TABLET BY MOUTH EVERY DAY, Disp: 90 tablet, Rfl: 1    diphenhydrAMINE (BENADRYL) 25 mg tablet, Take 25 mg by mouth as needed 2 pills as needed, Disp: , Rfl:     hydrocortisone 2 5 % cream, Apply topically 2 (two) times a day, Disp: 30 g, Rfl: 1    Icosapent Ethyl 1 g CAPS, 2 cap twice a day with food, Disp: 360 capsule, Rfl: 2    labetalol (NORMODYNE) 300 mg tablet, TAKE 1 TABLET BY MOUTH TWICE A DAY, Disp: 180 tablet, Rfl: 4    losartan (COZAAR) 100 MG tablet, Take 1 5 tabs daily, Disp: 135 tablet, Rfl: 1    predniSONE 10 mg tablet, Take 4 tabs po QD x 3 days then 3 tabs po QD x 3 days then 2 tabs po QD x 3 days then 1 tab po QD x 3 days then stop (Patient not taking: Reported on 6/9/2021), Disp: 30 tablet, Rfl: 0    predniSONE 20 mg tablet, Take 2 tablets (40 mg total) by mouth daily for 4 days (Patient not taking: Reported on 7/19/2021), Disp: 8 tablet, Rfl: 0    Subjective:      Patient ID: Maral Torres is a 78 y o  female  ER follow up for right hand pain, was prescribed antibiotic and steroid  CT right hand showed degenerative changes, no sign of infection noted  She is feeling better now, denies pain today  The following portions of the patient's history were reviewed and updated as appropriate:   She has a past medical history of Arthritis, Chronic kidney disease, Endometriosis, High cholesterol, Hypertension, Kidney disease, chronic, stage III (moderate, EGFR 30-59 ml/min) (HCC), Lumbar disc herniation, Neuropathy, Peripheral vascular disease ([facility] [address] ), Pneumonia, Shoulder injury, Spinal stenosis, and Stroke ([facility] [address] ) (2015)  ,  does not have any pertinent problems on file  ,   has a past surgical history that includes Rotator cuff repair (Left); Tonsillectomy; Ovarian cyst surgery; Fracture surgery (Right); Colonoscopy; Cardiac catheterization; [place] retrograde pyelogram (4/6/2020); pr cystourethroscopy,fulgur 0 5-2 cm lesn (N/A, 4/6/2020); pr cystourethroscopy,ureter catheter (Bilateral, 4/6/2020); and pr cystoscopy,insert ureteral stent (Right, 4/6/2020)  ,  family history includes Arthritis in her brother; Heart defect in her father; Heart disease in her mother; Hyperlipidemia in her mother; Hypertension in her father and mother; Other in her brother; Stroke in her sister  ,   reports that she quit smoking about a year ago  Her smoking use included cigarettes   She smoked 0 00 packs per day  She has never used smokeless tobacco  She reports that she does not drink alcohol and does not use drugs  ,  is allergic to fenofibrate, colesevelam, colestipol, ezetimibe, and statins       Review of Systems   Constitutional: Negative  Respiratory: Negative  Cardiovascular: Negative  Musculoskeletal: Negative  Psychiatric/Behavioral: Negative            Objective:  /74   Pulse 84   Temp 97 8 °F (36 6 °C) (Temporal)   Ht 4' 11" (1 499 m)   Wt 79 4 kg (175 lb)   LMP  (LMP Unknown)   SpO2 97%   BMI 35 35 kg/m²     Lab Review  Admission on 07/15/2021, Discharged on 07/15/2021   Component Date Value    WBC 07/15/2021 10 22*    RBC 07/15/2021 4 38     Hemoglobin 07/15/2021 13 1     Hematocrit 07/15/2021 38 8     MCV 07/15/2021 89     MCH 07/15/2021 29 9     MCHC 07/15/2021 33 8     RDW 07/15/2021 13 5     MPV 07/15/2021 10 5     Platelets 21/80/2072 210     nRBC 07/15/2021 0     Neutrophils Relative 07/15/2021 78*    Immat GRANS % 07/15/2021 1     Lymphocytes Relative 07/15/2021 11*    Monocytes Relative 07/15/2021 9     Eosinophils Relative 07/15/2021 1     Basophils Relative 07/15/2021 0     Neutrophils Absolute 07/15/2021 7 94*    Immature Grans Absolute 07/15/2021 0 08     Lymphocytes Absolute 07/15/2021 1 09     Monocytes Absolute 07/15/2021 0 93     Eosinophils Absolute 07/15/2021 0 14     Basophils Absolute 07/15/2021 0 04     Sodium 07/15/2021 140     Potassium 07/15/2021 3 8     Chloride 07/15/2021 102     CO2 07/15/2021 25     ANION GAP 07/15/2021 13     BUN 07/15/2021 55*    Creatinine 07/15/2021 2 45*    Glucose 07/15/2021 334*    Calcium 07/15/2021 9 9     AST 07/15/2021 10     ALT 07/15/2021 17     Alkaline Phosphatase 07/15/2021 95     Total Protein 07/15/2021 7 8     Albumin 07/15/2021 3 6     Total Bilirubin 07/15/2021 0 75     eGFR 07/15/2021 19     LACTIC ACID 07/15/2021 1 9     Uric Acid 07/15/2021 8 8*        Imaging: CT upper extremity wo contrast right    Result Date: 7/15/2021  Narrative: CT right hand/wrist without IV contrast INDICATION: Pain, swelling over dorsum of right wrist and hand, eval for deep infection  Possible injury while using cane COMPARISON: None  TECHNIQUE: CT examination of the right hand/wrist was performed  This examination, like all CT scans performed in the [facility], was performed utilizing techniques to minimize radiation dose exposure, including the use of iterative reconstruction and automated exposure control software  Sagittal and coronal two dimensional reconstructed images were also submitted for interpretation  Rad dose  691 mGy-cm FINDINGS: OSSEOUS STRUCTURES:  Moderate/severe triscaphe, and base of thumb osteoarthritis Osteochondral bodies present adjacent to the base of thumb Scattered degenerative changes throughout the interphalangeal joints, with soft tissue mineralization surrounding the 4th PIP Joint space narrowing in the 3rd and 4th MCPs Subcortical cyst within the ulnar styloid No irregular osseous erosion Effusion within the DRUJ and wrist VISUALIZED MUSCULATURE:  Unremarkable  SOFT TISSUES:  Chondrocalcinosis is present throughout the wrist OTHER PERTINENT FINDINGS:  None  Impression: 1  No evidence of deep infection on this unenhanced CT  No evidence of osteomyelitis  2   Degenerative changes throughout the wrist and fingers  Degenerative changes in the wrist are consistent with CPPD arthropathy  Workstation performed: [id number]      Physical Exam  Constitutional:       Appearance: She is well-developed  Cardiovascular:      Rate and Rhythm: Normal rate and regular rhythm  Heart sounds: Normal heart sounds  Pulmonary:      Effort: Pulmonary effort is normal       Breath sounds: Normal breath sounds  Musculoskeletal:         General: Swelling and tenderness present  Comments: Right ring finer- swelling to MCP joint     Neurological:      Mental Status: She is alert and oriented to person, place, and time  Deep Tendon Reflexes: Reflexes are normal and symmetric  Psychiatric:         Behavior: Behavior normal          Thought Content:  Thought content normal          Judgment: Judgment normal

## 2021-07-19 NOTE — PATIENT INSTRUCTIONS
Right hand swelling- Right ring finger swollen, improved per patient  Pain has resolved  Continue antibiotic  If pain returns, will refer to hand specialist     Follow up in September as scheduled

## 2021-08-02 DIAGNOSIS — I70.0 AORTOILIAC STENOSIS, LEFT (HCC): ICD-10-CM

## 2021-08-02 DIAGNOSIS — E11.59 HYPERTENSION ASSOCIATED WITH DIABETES (HCC): ICD-10-CM

## 2021-08-02 DIAGNOSIS — R21 RASH: ICD-10-CM

## 2021-08-02 DIAGNOSIS — R60.9 DEPENDENT EDEMA: ICD-10-CM

## 2021-08-02 DIAGNOSIS — E78.5 HYPERLIPIDEMIA ASSOCIATED WITH TYPE 2 DIABETES MELLITUS (HCC): ICD-10-CM

## 2021-08-02 DIAGNOSIS — I10 HYPERTENSION, UNSPECIFIED TYPE: ICD-10-CM

## 2021-08-02 DIAGNOSIS — E11.69 HYPERLIPIDEMIA ASSOCIATED WITH TYPE 2 DIABETES MELLITUS (HCC): ICD-10-CM

## 2021-08-02 DIAGNOSIS — I65.1 BASILAR ARTERY STENOSIS: ICD-10-CM

## 2021-08-02 DIAGNOSIS — I15.2 HYPERTENSION ASSOCIATED WITH DIABETES (HCC): ICD-10-CM

## 2021-08-02 DIAGNOSIS — Z86.73 HISTORY OF CVA (CEREBROVASCULAR ACCIDENT): ICD-10-CM

## 2021-08-02 DIAGNOSIS — I73.9 CLAUDICATION IN PERIPHERAL VASCULAR DISEASE (HCC): ICD-10-CM

## 2021-08-02 PROCEDURE — 4010F ACE/ARB THERAPY RXD/TAKEN: CPT | Performed by: NURSE PRACTITIONER

## 2021-08-02 RX ORDER — LOSARTAN POTASSIUM 100 MG/1
TABLET ORAL
Qty: 135 TABLET | Refills: 1 | Status: SHIPPED | OUTPATIENT
Start: 2021-08-02 | End: 2022-01-25 | Stop reason: HOSPADM

## 2021-08-10 DIAGNOSIS — R21 RASH: ICD-10-CM

## 2021-08-10 DIAGNOSIS — E78.5 HYPERLIPIDEMIA ASSOCIATED WITH TYPE 2 DIABETES MELLITUS (HCC): ICD-10-CM

## 2021-08-10 DIAGNOSIS — I73.9 CLAUDICATION IN PERIPHERAL VASCULAR DISEASE (HCC): ICD-10-CM

## 2021-08-10 DIAGNOSIS — I70.0 AORTOILIAC STENOSIS, LEFT (HCC): ICD-10-CM

## 2021-08-10 DIAGNOSIS — E11.69 HYPERLIPIDEMIA ASSOCIATED WITH TYPE 2 DIABETES MELLITUS (HCC): ICD-10-CM

## 2021-08-10 DIAGNOSIS — I15.2 HYPERTENSION ASSOCIATED WITH DIABETES (HCC): ICD-10-CM

## 2021-08-10 DIAGNOSIS — I10 HYPERTENSION, UNSPECIFIED TYPE: ICD-10-CM

## 2021-08-10 DIAGNOSIS — I65.1 BASILAR ARTERY STENOSIS: ICD-10-CM

## 2021-08-10 DIAGNOSIS — R60.9 DEPENDENT EDEMA: ICD-10-CM

## 2021-08-10 DIAGNOSIS — Z86.73 HISTORY OF CVA (CEREBROVASCULAR ACCIDENT): ICD-10-CM

## 2021-08-10 DIAGNOSIS — E11.59 HYPERTENSION ASSOCIATED WITH DIABETES (HCC): ICD-10-CM

## 2021-08-10 RX ORDER — CILOSTAZOL 100 MG/1
TABLET ORAL
Qty: 180 TABLET | Refills: 1 | Status: SHIPPED | OUTPATIENT
Start: 2021-08-10 | End: 2022-01-10 | Stop reason: ALTCHOICE

## 2021-08-13 ENCOUNTER — OFFICE VISIT (OUTPATIENT)
Dept: NEUROLOGY | Facility: CLINIC | Age: 76
End: 2021-08-13
Payer: COMMERCIAL

## 2021-08-13 VITALS
RESPIRATION RATE: 16 BRPM | BODY MASS INDEX: 35.28 KG/M2 | SYSTOLIC BLOOD PRESSURE: 138 MMHG | HEART RATE: 78 BPM | HEIGHT: 59 IN | TEMPERATURE: 97.4 F | DIASTOLIC BLOOD PRESSURE: 84 MMHG | WEIGHT: 175 LBS

## 2021-08-13 DIAGNOSIS — M47.816 SPONDYLOSIS OF LUMBAR REGION WITHOUT MYELOPATHY OR RADICULOPATHY: Primary | ICD-10-CM

## 2021-08-13 DIAGNOSIS — R26.9 NEUROLOGIC GAIT DYSFUNCTION: ICD-10-CM

## 2021-08-13 DIAGNOSIS — Z86.73 HISTORY OF CVA (CEREBROVASCULAR ACCIDENT): ICD-10-CM

## 2021-08-13 PROCEDURE — 3008F BODY MASS INDEX DOCD: CPT | Performed by: PSYCHIATRY & NEUROLOGY

## 2021-08-13 PROCEDURE — 1160F RVW MEDS BY RX/DR IN RCRD: CPT | Performed by: PSYCHIATRY & NEUROLOGY

## 2021-08-13 PROCEDURE — 3075F SYST BP GE 130 - 139MM HG: CPT | Performed by: PSYCHIATRY & NEUROLOGY

## 2021-08-13 PROCEDURE — 1036F TOBACCO NON-USER: CPT | Performed by: PSYCHIATRY & NEUROLOGY

## 2021-08-13 PROCEDURE — 3079F DIAST BP 80-89 MM HG: CPT | Performed by: PSYCHIATRY & NEUROLOGY

## 2021-08-13 PROCEDURE — 99214 OFFICE O/P EST MOD 30 MIN: CPT | Performed by: PSYCHIATRY & NEUROLOGY

## 2021-08-13 RX ORDER — LIDOCAINE 50 MG/G
1 PATCH TOPICAL DAILY
Qty: 30 PATCH | Refills: 6 | Status: SHIPPED | OUTPATIENT
Start: 2021-08-13 | End: 2021-09-02

## 2021-08-13 NOTE — PROGRESS NOTES
Progress Note - Neurology   Damian Posadas 78 y o  female MRN: [id number]  Unit/Bed#:  Encounter: [id number]      Subjective:    patient is here for a follow-up visit having suffered a CVA in the past, chronic low back pain, peripheral arterial disease, cerebrovascular disease with gait dysfunction  She describes claudicatory symptoms in the lower extremities worsening in the recent past, with difficulty ambulating for any period of time  She also describes left hip pain, left shoulder pain, and has CKD, refused hemodialysis  Recently also treated for bladder cancer  Patient's blood sugars are not under complete control yet, and also suffers from hypertension  Her major symptoms  described today are worsening gait dysfunction and pain in her hip and her shoulder  ROS:   Review of Systems   Constitutional: Negative  Negative for appetite change and fever  HENT: Negative  Negative for hearing loss, tinnitus, trouble swallowing and voice change  Eyes: Negative  Negative for photophobia and pain  Respiratory: Negative  Negative for shortness of breath  Cardiovascular: Negative  Negative for palpitations  Gastrointestinal: Negative  Negative for nausea and vomiting  Endocrine: Negative  Negative for cold intolerance  Genitourinary: Negative  Negative for dysuria, frequency and urgency  Musculoskeletal: Positive for back pain and gait problem  Negative for myalgias and neck pain  Skin: Negative  Negative for rash  Neurological: Positive for weakness  Negative for dizziness, tremors, seizures, syncope, facial asymmetry, speech difficulty, light-headedness, numbness and headaches  Hematological: Negative  Does not bruise/bleed easily  Psychiatric/Behavioral: Negative  Negative for confusion, hallucinations and sleep disturbance  MA review of systems was reviewed by myself      Vitals:   Vitals:    08/13/21 1030   BP: 138/84   BP Location: Left arm   Patient Position: Sitting Cuff Size: Standard   Pulse: 78   Resp: 16   Temp: (!) 97 4 °F (36 3 °C)   TempSrc: Temporal   Weight: 79 4 kg (175 lb)   Height: 4' 11" (1 499 m)   ,Body mass index is 35 35 kg/m²      MEDS:      Current Outpatient Medications:     allopurinol (ZYLOPRIM) 100 mg tablet, TAKE 1 TABLET BY MOUTH EVERY DAY, Disp: 90 tablet, Rfl: 2    amLODIPine (NORVASC) 2 5 mg tablet, Take 1 tablet (2 5 mg total) by mouth daily, Disp: 90 tablet, Rfl: 2    chlorthalidone 25 mg tablet, Take 1 tablet (25 mg total) by mouth daily, Disp: 90 tablet, Rfl: 2    Cholecalciferol 50 MCG (2000 UT) TABS, Take 2 tablets (4,000 Units total) by mouth daily, Disp: , Rfl:     cilostazol (PLETAL) 100 mg tablet, TAKE 1 TABLET BY MOUTH TWICE A DAY, Disp: 180 tablet, Rfl: 1    cloNIDine (CATAPRES-TTS-3) 0 3 mg/24 hr, Place 1 patch (0 3 mg total) on the skin once a week, Disp: 12 patch, Rfl: 2    clopidogrel (PLAVIX) 75 mg tablet, TAKE 1 TABLET BY MOUTH EVERY DAY, Disp: 90 tablet, Rfl: 1    diphenhydrAMINE (BENADRYL) 25 mg tablet, Take 25 mg by mouth as needed 2 pills as needed, Disp: , Rfl:     hydrocortisone 2 5 % cream, Apply topically 2 (two) times a day, Disp: 30 g, Rfl: 1    Icosapent Ethyl 1 g CAPS, 2 cap twice a day with food, Disp: 360 capsule, Rfl: 2    labetalol (NORMODYNE) 300 mg tablet, TAKE 1 TABLET BY MOUTH TWICE A DAY, Disp: 180 tablet, Rfl: 4    losartan (COZAAR) 100 MG tablet, TAKE 1 5 TABLETS BY MOUTH DAILY, Disp: 135 tablet, Rfl: 1  :    Physical Exam:  General appearance: alert, appears stated age and cooperative  Head: Normocephalic, without obvious abnormality, atraumatic      On neurological examination patient is alert awake oriented, speech is fluent, cranial nerves 2-12 intact, and on motor and sensory exam there is no evidence of any focal weakness or proximal muscle tenderness, deep tendon reflexes absent bilaterally, she has sensory loss in both feet, no evidence of any dysmetria was noted and patient ambulates with the help of a walker  Patient has lumbosacral tenderness as well as left hip tenderness  Lab Results: I have personally reviewed pertinent reports  Imaging Studies: I have personally reviewed pertinent reports  Assessment:  1  Worsening gait dysfunction, multifactorial, with chronic low back pain, lumbar spondylosis, peripheral arterial disease with  Claudication  2  History of CVA  Plan:    Patient is advised tight blood pressure and blood sugar control, she refuses hemodialysis at this time, and will prescribe her Lidoderm patches for her chronic low back pain and hip pain, at this time she will also discuss with her PCP regarding possibly using a wheelchair when she is out doors due to her worsening gait difficulty  Patient will return back to see me in 6 months    Counseling / Coordination of Care  Total time spent today 25 minutes  Greater than 50% of total time was spent with the patient and / or family counseling and / or coordination of care  8/13/2021,10:33 AM    Dictation voice to text software has been used in the creation of this document  Please consider this in light of any contextual or grammatical errors

## 2021-08-25 ENCOUNTER — HOSPITAL ENCOUNTER (OUTPATIENT)
Dept: CT IMAGING | Facility: CLINIC | Age: 76
Discharge: HOME | End: 2021-08-25
Payer: COMMERCIAL

## 2021-08-25 DIAGNOSIS — R91.8 LUNG NODULES: ICD-10-CM

## 2021-08-25 PROCEDURE — 71250 CT THORAX DX C-: CPT

## 2021-08-25 PROCEDURE — G1004 CDSM NDSC: HCPCS

## 2021-08-31 ENCOUNTER — DOCUMENTATION (OUTPATIENT)
Dept: INTERNAL MEDICINE CLINIC | Facility: CLINIC | Age: 76
End: 2021-08-31

## 2021-08-31 ENCOUNTER — TELEPHONE (OUTPATIENT)
Dept: PULMONOLOGY | Facility: CLINIC | Age: 76
End: 2021-08-31

## 2021-08-31 ENCOUNTER — TELEPHONE (OUTPATIENT)
Dept: INTERNAL MEDICINE CLINIC | Facility: CLINIC | Age: 76
End: 2021-08-31

## 2021-08-31 NOTE — PROGRESS NOTES
I spoke with Dr Carlito Alcala in regards to CT chest findings  There is a 2 9 x 2 4 x 3 1 cm nodular soft tissue density along the lesser curvature of the stomach ? Possibility of gastric ulcer or mass such as GIST tumor is raised  It is recommended that she have a CT A/P with oral and IV contrast  Patient does have h/o CKD stage 4  I have reached out to radiology to determine if there is another study that can be preformed  If not I will speak to nephrology to come up with a plan  I spoke with Dr Marry Steve from radiology who recommended an EUS given CKD  Radiology had recommended EUS  GI consult placed  Will have staff call the patient

## 2021-08-31 NOTE — TELEPHONE ENCOUNTER
----- Message from Nola Harvey MD sent at 8/31/2021  1:30 PM EDT -----  Anamaria's imaging revealed a soft mass on the lining of her stomach that has been there since 2019  The radiologist has recommended additional testing  She should see GI for a possible EUS  Consult has been placed

## 2021-09-01 ENCOUNTER — TELEPHONE (OUTPATIENT)
Dept: INTERNAL MEDICINE CLINIC | Facility: CLINIC | Age: 76
End: 2021-09-01

## 2021-09-01 NOTE — TELEPHONE ENCOUNTER
----- Message from Laurence Angel MD sent at 8/31/2021  8:08 AM EDT -----  Imaging revealing soft tissue structure of stomach lining that needs more dedicated testing  Patient cannot have IV contrast given kidney function  Tigist can discuss this study with the patient and nephrologist at her next visit on 9/13

## 2021-09-02 DIAGNOSIS — M47.816 SPONDYLOSIS OF LUMBAR REGION WITHOUT MYELOPATHY OR RADICULOPATHY: ICD-10-CM

## 2021-09-02 RX ORDER — LIDOCAINE 50 MG/G
1 PATCH TOPICAL DAILY
Qty: 30 PATCH | Refills: 6 | Status: SHIPPED | OUTPATIENT
Start: 2021-09-02

## 2021-10-12 ENCOUNTER — VBI (OUTPATIENT)
Dept: ADMINISTRATIVE | Facility: OTHER | Age: 76
End: 2021-10-12

## 2021-10-27 ENCOUNTER — TELEPHONE (OUTPATIENT)
Dept: INTERNAL MEDICINE CLINIC | Facility: CLINIC | Age: 76
End: 2021-10-27

## 2021-11-09 DIAGNOSIS — E78.5 HYPERLIPIDEMIA ASSOCIATED WITH TYPE 2 DIABETES MELLITUS (HCC): ICD-10-CM

## 2021-11-09 DIAGNOSIS — E11.69 HYPERLIPIDEMIA ASSOCIATED WITH TYPE 2 DIABETES MELLITUS (HCC): ICD-10-CM

## 2021-11-09 RX ORDER — ICOSAPENT ETHYL 1000 MG/1
CAPSULE ORAL
Qty: 360 CAPSULE | Refills: 2 | Status: SHIPPED | OUTPATIENT
Start: 2021-11-09 | End: 2022-01-31 | Stop reason: SDUPTHER

## 2021-11-18 DIAGNOSIS — E11.69 HYPERLIPIDEMIA ASSOCIATED WITH TYPE 2 DIABETES MELLITUS (HCC): ICD-10-CM

## 2021-11-18 DIAGNOSIS — I73.9 CLAUDICATION IN PERIPHERAL VASCULAR DISEASE (HCC): ICD-10-CM

## 2021-11-18 DIAGNOSIS — I10 HYPERTENSION, UNSPECIFIED TYPE: ICD-10-CM

## 2021-11-18 DIAGNOSIS — E78.5 HYPERLIPIDEMIA ASSOCIATED WITH TYPE 2 DIABETES MELLITUS (HCC): ICD-10-CM

## 2021-11-18 DIAGNOSIS — E11.59 HYPERTENSION ASSOCIATED WITH DIABETES (HCC): ICD-10-CM

## 2021-11-18 DIAGNOSIS — R60.9 DEPENDENT EDEMA: ICD-10-CM

## 2021-11-18 DIAGNOSIS — I70.0 AORTOILIAC STENOSIS, LEFT (HCC): ICD-10-CM

## 2021-11-18 DIAGNOSIS — I65.1 BASILAR ARTERY STENOSIS: ICD-10-CM

## 2021-11-18 DIAGNOSIS — R21 RASH: ICD-10-CM

## 2021-11-18 DIAGNOSIS — Z86.73 HISTORY OF CVA (CEREBROVASCULAR ACCIDENT): ICD-10-CM

## 2021-11-18 DIAGNOSIS — I15.2 HYPERTENSION ASSOCIATED WITH DIABETES (HCC): ICD-10-CM

## 2021-11-18 RX ORDER — CHLORTHALIDONE 25 MG/1
25 TABLET ORAL DAILY
Qty: 90 TABLET | Refills: 1 | Status: SHIPPED | OUTPATIENT
Start: 2021-11-18 | End: 2022-01-25 | Stop reason: HOSPADM

## 2021-11-22 ENCOUNTER — APPOINTMENT (OUTPATIENT)
Dept: LAB | Facility: HOSPITAL | Age: 76
End: 2021-11-22
Payer: COMMERCIAL

## 2021-11-22 DIAGNOSIS — N18.4 HYPERTENSIVE KIDNEY DISEASE WITH STAGE 4 CHRONIC KIDNEY DISEASE (HCC): ICD-10-CM

## 2021-11-22 DIAGNOSIS — N18.4 STAGE 4 CHRONIC KIDNEY DISEASE (HCC): ICD-10-CM

## 2021-11-22 DIAGNOSIS — E79.0 HYPERURICEMIA: ICD-10-CM

## 2021-11-22 DIAGNOSIS — E11.51 TYPE 2 DIABETES MELLITUS WITH DIABETIC PERIPHERAL ANGIOPATHY WITHOUT GANGRENE, WITHOUT LONG-TERM CURRENT USE OF INSULIN (HCC): ICD-10-CM

## 2021-11-22 DIAGNOSIS — I10 HYPERTENSION, UNSPECIFIED TYPE: ICD-10-CM

## 2021-11-22 DIAGNOSIS — M1A.3720 CHRONIC GOUT DUE TO RENAL IMPAIRMENT INVOLVING TOE OF LEFT FOOT WITHOUT TOPHUS: ICD-10-CM

## 2021-11-22 DIAGNOSIS — E11.69 HYPERLIPIDEMIA ASSOCIATED WITH TYPE 2 DIABETES MELLITUS (HCC): ICD-10-CM

## 2021-11-22 DIAGNOSIS — E78.5 HYPERLIPIDEMIA ASSOCIATED WITH TYPE 2 DIABETES MELLITUS (HCC): ICD-10-CM

## 2021-11-22 DIAGNOSIS — I12.9 HYPERTENSIVE KIDNEY DISEASE WITH STAGE 4 CHRONIC KIDNEY DISEASE (HCC): ICD-10-CM

## 2021-11-22 DIAGNOSIS — E55.9 VITAMIN D DEFICIENCY: ICD-10-CM

## 2021-11-22 LAB
25(OH)D3 SERPL-MCNC: 52.9 NG/ML (ref 30–100)
ALBUMIN SERPL BCP-MCNC: 4.2 G/DL (ref 3.5–5)
ALP SERPL-CCNC: 110 U/L (ref 46–116)
ALT SERPL W P-5'-P-CCNC: 31 U/L (ref 12–78)
ANION GAP SERPL CALCULATED.3IONS-SCNC: 11 MMOL/L (ref 4–13)
AST SERPL W P-5'-P-CCNC: 18 U/L (ref 5–45)
BACTERIA UR QL AUTO: ABNORMAL /HPF
BASOPHILS # BLD AUTO: 0.08 THOUSANDS/ΜL (ref 0–0.1)
BASOPHILS NFR BLD AUTO: 1 % (ref 0–1)
BILIRUB SERPL-MCNC: 0.52 MG/DL (ref 0.2–1)
BILIRUB UR QL STRIP: NEGATIVE
BUN SERPL-MCNC: 27 MG/DL (ref 5–25)
CALCIUM SERPL-MCNC: 10.4 MG/DL (ref 8.3–10.1)
CHLORIDE SERPL-SCNC: 102 MMOL/L (ref 100–108)
CHOLEST SERPL-MCNC: 362 MG/DL
CLARITY UR: CLEAR
CO2 SERPL-SCNC: 28 MMOL/L (ref 21–32)
COLOR UR: YELLOW
CREAT SERPL-MCNC: 1.94 MG/DL (ref 0.6–1.3)
CREAT UR-MCNC: 67.7 MG/DL
EOSINOPHIL # BLD AUTO: 0.28 THOUSAND/ΜL (ref 0–0.61)
EOSINOPHIL NFR BLD AUTO: 4 % (ref 0–6)
ERYTHROCYTE [DISTWIDTH] IN BLOOD BY AUTOMATED COUNT: 13.5 % (ref 11.6–15.1)
EST. AVERAGE GLUCOSE BLD GHB EST-MCNC: 171 MG/DL
GFR SERPL CREATININE-BSD FRML MDRD: 25 ML/MIN/1.73SQ M
GLUCOSE P FAST SERPL-MCNC: 198 MG/DL (ref 65–99)
GLUCOSE UR STRIP-MCNC: NEGATIVE MG/DL
HBA1C MFR BLD: 7.6 %
HCT VFR BLD AUTO: 45.8 % (ref 34.8–46.1)
HDLC SERPL-MCNC: 46 MG/DL
HGB BLD-MCNC: 15.1 G/DL (ref 11.5–15.4)
HGB UR QL STRIP.AUTO: NEGATIVE
IMM GRANULOCYTES # BLD AUTO: 0.1 THOUSAND/UL (ref 0–0.2)
IMM GRANULOCYTES NFR BLD AUTO: 1 % (ref 0–2)
KETONES UR STRIP-MCNC: NEGATIVE MG/DL
LDLC SERPL CALC-MCNC: 258 MG/DL (ref 0–100)
LEUKOCYTE ESTERASE UR QL STRIP: ABNORMAL
LYMPHOCYTES # BLD AUTO: 1.68 THOUSANDS/ΜL (ref 0.6–4.47)
LYMPHOCYTES NFR BLD AUTO: 21 % (ref 14–44)
MAGNESIUM SERPL-MCNC: 1.7 MG/DL (ref 1.6–2.6)
MCH RBC QN AUTO: 29.7 PG (ref 26.8–34.3)
MCHC RBC AUTO-ENTMCNC: 33 G/DL (ref 31.4–37.4)
MCV RBC AUTO: 90 FL (ref 82–98)
MICROALBUMIN UR-MCNC: 168 MG/L (ref 0–20)
MICROALBUMIN/CREAT 24H UR: 248 MG/G CREATININE (ref 0–30)
MONOCYTES # BLD AUTO: 0.64 THOUSAND/ΜL (ref 0.17–1.22)
MONOCYTES NFR BLD AUTO: 8 % (ref 4–12)
NEUTROPHILS # BLD AUTO: 5.13 THOUSANDS/ΜL (ref 1.85–7.62)
NEUTS SEG NFR BLD AUTO: 65 % (ref 43–75)
NITRITE UR QL STRIP: POSITIVE
NON-SQ EPI CELLS URNS QL MICRO: ABNORMAL /HPF
NONHDLC SERPL-MCNC: 316 MG/DL
NRBC BLD AUTO-RTO: 0 /100 WBCS
PH UR STRIP.AUTO: 5.5 [PH]
PHOSPHATE SERPL-MCNC: 3.8 MG/DL (ref 2.3–4.1)
PLATELET # BLD AUTO: 264 THOUSANDS/UL (ref 149–390)
PMV BLD AUTO: 10.1 FL (ref 8.9–12.7)
POTASSIUM SERPL-SCNC: 4.7 MMOL/L (ref 3.5–5.3)
PROT SERPL-MCNC: 8.3 G/DL (ref 6.4–8.2)
PROT UR STRIP-MCNC: NEGATIVE MG/DL
PTH-INTACT SERPL-MCNC: 65.9 PG/ML (ref 18.4–80.1)
RBC # BLD AUTO: 5.08 MILLION/UL (ref 3.81–5.12)
RBC #/AREA URNS AUTO: ABNORMAL /HPF
SODIUM SERPL-SCNC: 141 MMOL/L (ref 136–145)
SP GR UR STRIP.AUTO: 1.02 (ref 1–1.03)
TRIGL SERPL-MCNC: 291 MG/DL
TSH SERPL DL<=0.05 MIU/L-ACNC: 3.43 UIU/ML (ref 0.36–3.74)
URATE SERPL-MCNC: 7.8 MG/DL (ref 2–6.8)
UROBILINOGEN UR QL STRIP.AUTO: 0.2 E.U./DL
WBC # BLD AUTO: 7.91 THOUSAND/UL (ref 4.31–10.16)
WBC #/AREA URNS AUTO: ABNORMAL /HPF
WBC CLUMPS # UR AUTO: PRESENT /UL

## 2021-11-22 PROCEDURE — 84100 ASSAY OF PHOSPHORUS: CPT

## 2021-11-22 PROCEDURE — 36415 COLL VENOUS BLD VENIPUNCTURE: CPT

## 2021-11-22 PROCEDURE — 82570 ASSAY OF URINE CREATININE: CPT

## 2021-11-22 PROCEDURE — 83735 ASSAY OF MAGNESIUM: CPT

## 2021-11-22 PROCEDURE — 82043 UR ALBUMIN QUANTITATIVE: CPT

## 2021-11-22 PROCEDURE — 85025 COMPLETE CBC W/AUTO DIFF WBC: CPT

## 2021-11-22 PROCEDURE — 80061 LIPID PANEL: CPT

## 2021-11-22 PROCEDURE — 84443 ASSAY THYROID STIM HORMONE: CPT

## 2021-11-22 PROCEDURE — 83970 ASSAY OF PARATHORMONE: CPT

## 2021-11-22 PROCEDURE — 84550 ASSAY OF BLOOD/URIC ACID: CPT

## 2021-11-22 PROCEDURE — 80053 COMPREHEN METABOLIC PANEL: CPT

## 2021-11-22 PROCEDURE — 82306 VITAMIN D 25 HYDROXY: CPT

## 2021-11-22 PROCEDURE — 81001 URINALYSIS AUTO W/SCOPE: CPT

## 2021-11-22 PROCEDURE — 83036 HEMOGLOBIN GLYCOSYLATED A1C: CPT

## 2021-11-29 ENCOUNTER — OFFICE VISIT (OUTPATIENT)
Dept: PULMONOLOGY | Facility: CLINIC | Age: 76
End: 2021-11-29
Payer: COMMERCIAL

## 2021-11-29 VITALS
DIASTOLIC BLOOD PRESSURE: 84 MMHG | TEMPERATURE: 96.3 F | HEART RATE: 71 BPM | OXYGEN SATURATION: 97 % | HEIGHT: 58 IN | SYSTOLIC BLOOD PRESSURE: 120 MMHG | WEIGHT: 174 LBS | BODY MASS INDEX: 36.53 KG/M2

## 2021-11-29 DIAGNOSIS — R93.3 ABNORMAL FINDING ON GI TRACT IMAGING: ICD-10-CM

## 2021-11-29 DIAGNOSIS — F17.200 TOBACCO USE DISORDER: ICD-10-CM

## 2021-11-29 DIAGNOSIS — R91.8 LUNG NODULES: Primary | ICD-10-CM

## 2021-11-29 DIAGNOSIS — R93.89 ABNORMAL CT OF THE CHEST: ICD-10-CM

## 2021-11-29 PROCEDURE — 99214 OFFICE O/P EST MOD 30 MIN: CPT | Performed by: INTERNAL MEDICINE

## 2021-12-01 ENCOUNTER — OFFICE VISIT (OUTPATIENT)
Dept: INTERNAL MEDICINE CLINIC | Facility: CLINIC | Age: 76
End: 2021-12-01
Payer: COMMERCIAL

## 2021-12-01 VITALS
HEIGHT: 58 IN | BODY MASS INDEX: 36.53 KG/M2 | WEIGHT: 174 LBS | OXYGEN SATURATION: 99 % | DIASTOLIC BLOOD PRESSURE: 80 MMHG | SYSTOLIC BLOOD PRESSURE: 132 MMHG | HEART RATE: 73 BPM

## 2021-12-01 DIAGNOSIS — K21.9 CHRONIC GERD: Primary | ICD-10-CM

## 2021-12-01 DIAGNOSIS — I10 HYPERTENSION, UNSPECIFIED TYPE: ICD-10-CM

## 2021-12-01 DIAGNOSIS — E78.5 HYPERLIPIDEMIA, UNSPECIFIED HYPERLIPIDEMIA TYPE: ICD-10-CM

## 2021-12-01 DIAGNOSIS — I24.0 RIGHT CORONARY ARTERY OCCLUSION (HCC): ICD-10-CM

## 2021-12-01 DIAGNOSIS — F17.200 TOBACCO USE DISORDER: ICD-10-CM

## 2021-12-01 DIAGNOSIS — E11.21 TYPE 2 DIABETES MELLITUS WITH DIABETIC NEPHROPATHY, WITHOUT LONG-TERM CURRENT USE OF INSULIN (HCC): ICD-10-CM

## 2021-12-01 PROBLEM — R82.998 DARK URINE: Status: RESOLVED | Noted: 2020-07-15 | Resolved: 2021-12-01

## 2021-12-01 PROBLEM — M79.641 RIGHT HAND PAIN: Status: RESOLVED | Noted: 2021-07-19 | Resolved: 2021-12-01

## 2021-12-01 PROBLEM — R60.0 LOCALIZED EDEMA: Status: RESOLVED | Noted: 2020-07-15 | Resolved: 2021-12-01

## 2021-12-01 PROBLEM — M25.512 ACUTE PAIN OF LEFT SHOULDER: Status: RESOLVED | Noted: 2020-05-12 | Resolved: 2021-12-01

## 2021-12-01 PROCEDURE — 3066F NEPHROPATHY DOC TX: CPT | Performed by: INTERNAL MEDICINE

## 2021-12-01 PROCEDURE — 99214 OFFICE O/P EST MOD 30 MIN: CPT | Performed by: NURSE PRACTITIONER

## 2021-12-01 PROCEDURE — 3075F SYST BP GE 130 - 139MM HG: CPT | Performed by: NURSE PRACTITIONER

## 2021-12-01 PROCEDURE — 3079F DIAST BP 80-89 MM HG: CPT | Performed by: NURSE PRACTITIONER

## 2021-12-06 ENCOUNTER — TELEPHONE (OUTPATIENT)
Dept: NEPHROLOGY | Facility: CLINIC | Age: 76
End: 2021-12-06

## 2021-12-06 DIAGNOSIS — I65.1 BASILAR ARTERY STENOSIS: ICD-10-CM

## 2021-12-06 DIAGNOSIS — E11.69 HYPERLIPIDEMIA ASSOCIATED WITH TYPE 2 DIABETES MELLITUS (HCC): ICD-10-CM

## 2021-12-06 DIAGNOSIS — I73.9 CLAUDICATION IN PERIPHERAL VASCULAR DISEASE (HCC): ICD-10-CM

## 2021-12-06 DIAGNOSIS — I70.0 AORTOILIAC STENOSIS, LEFT (HCC): ICD-10-CM

## 2021-12-06 DIAGNOSIS — E11.59 HYPERTENSION ASSOCIATED WITH DIABETES (HCC): ICD-10-CM

## 2021-12-06 DIAGNOSIS — R21 RASH: ICD-10-CM

## 2021-12-06 DIAGNOSIS — R60.9 DEPENDENT EDEMA: ICD-10-CM

## 2021-12-06 DIAGNOSIS — E78.5 HYPERLIPIDEMIA ASSOCIATED WITH TYPE 2 DIABETES MELLITUS (HCC): ICD-10-CM

## 2021-12-06 DIAGNOSIS — I10 HYPERTENSION, UNSPECIFIED TYPE: ICD-10-CM

## 2021-12-06 DIAGNOSIS — Z86.73 HISTORY OF CVA (CEREBROVASCULAR ACCIDENT): ICD-10-CM

## 2021-12-06 DIAGNOSIS — I15.2 HYPERTENSION ASSOCIATED WITH DIABETES (HCC): ICD-10-CM

## 2021-12-06 RX ORDER — CLONIDINE 0.3 MG/24H
1 PATCH, EXTENDED RELEASE TRANSDERMAL WEEKLY
Qty: 12 PATCH | Refills: 2 | Status: SHIPPED | OUTPATIENT
Start: 2021-12-06

## 2021-12-08 ENCOUNTER — OFFICE VISIT (OUTPATIENT)
Dept: NEPHROLOGY | Facility: CLINIC | Age: 76
End: 2021-12-08
Payer: COMMERCIAL

## 2021-12-08 VITALS
RESPIRATION RATE: 16 BRPM | HEIGHT: 59 IN | TEMPERATURE: 96.4 F | SYSTOLIC BLOOD PRESSURE: 134 MMHG | HEART RATE: 73 BPM | WEIGHT: 173.8 LBS | DIASTOLIC BLOOD PRESSURE: 70 MMHG | BODY MASS INDEX: 35.04 KG/M2

## 2021-12-08 DIAGNOSIS — I12.9 HYPERTENSIVE KIDNEY DISEASE WITH STAGE 4 CHRONIC KIDNEY DISEASE (HCC): ICD-10-CM

## 2021-12-08 DIAGNOSIS — R80.8 OTHER PROTEINURIA: ICD-10-CM

## 2021-12-08 DIAGNOSIS — E55.9 VITAMIN D DEFICIENCY: ICD-10-CM

## 2021-12-08 DIAGNOSIS — N18.4 STAGE 4 CHRONIC KIDNEY DISEASE (HCC): Primary | ICD-10-CM

## 2021-12-08 DIAGNOSIS — N18.4 HYPERTENSIVE KIDNEY DISEASE WITH STAGE 4 CHRONIC KIDNEY DISEASE (HCC): ICD-10-CM

## 2021-12-08 PROCEDURE — 99214 OFFICE O/P EST MOD 30 MIN: CPT | Performed by: INTERNAL MEDICINE

## 2021-12-08 PROCEDURE — 1160F RVW MEDS BY RX/DR IN RCRD: CPT | Performed by: INTERNAL MEDICINE

## 2021-12-08 PROCEDURE — 3008F BODY MASS INDEX DOCD: CPT | Performed by: INTERNAL MEDICINE

## 2021-12-08 PROCEDURE — 1036F TOBACCO NON-USER: CPT | Performed by: INTERNAL MEDICINE

## 2022-01-04 DIAGNOSIS — R60.9 DEPENDENT EDEMA: ICD-10-CM

## 2022-01-04 DIAGNOSIS — E11.59 HYPERTENSION ASSOCIATED WITH DIABETES (HCC): ICD-10-CM

## 2022-01-04 DIAGNOSIS — I65.1 BASILAR ARTERY STENOSIS: ICD-10-CM

## 2022-01-04 DIAGNOSIS — E78.5 HYPERLIPIDEMIA ASSOCIATED WITH TYPE 2 DIABETES MELLITUS (HCC): ICD-10-CM

## 2022-01-04 DIAGNOSIS — Z86.73 HISTORY OF CVA (CEREBROVASCULAR ACCIDENT): ICD-10-CM

## 2022-01-04 DIAGNOSIS — I10 HYPERTENSION, UNSPECIFIED TYPE: ICD-10-CM

## 2022-01-04 DIAGNOSIS — I73.9 CLAUDICATION IN PERIPHERAL VASCULAR DISEASE (HCC): ICD-10-CM

## 2022-01-04 DIAGNOSIS — I70.0 AORTOILIAC STENOSIS, LEFT (HCC): ICD-10-CM

## 2022-01-04 DIAGNOSIS — R21 RASH: ICD-10-CM

## 2022-01-04 DIAGNOSIS — E11.69 HYPERLIPIDEMIA ASSOCIATED WITH TYPE 2 DIABETES MELLITUS (HCC): ICD-10-CM

## 2022-01-04 DIAGNOSIS — I15.2 HYPERTENSION ASSOCIATED WITH DIABETES (HCC): ICD-10-CM

## 2022-01-04 RX ORDER — CLOPIDOGREL BISULFATE 75 MG/1
TABLET ORAL
Qty: 90 TABLET | Refills: 1 | Status: SHIPPED | OUTPATIENT
Start: 2022-01-04 | End: 2022-08-01 | Stop reason: SDUPTHER

## 2022-01-10 ENCOUNTER — OFFICE VISIT (OUTPATIENT)
Dept: CARDIOLOGY CLINIC | Facility: CLINIC | Age: 77
End: 2022-01-10
Payer: COMMERCIAL

## 2022-01-10 VITALS
DIASTOLIC BLOOD PRESSURE: 78 MMHG | HEART RATE: 68 BPM | SYSTOLIC BLOOD PRESSURE: 138 MMHG | WEIGHT: 174 LBS | HEIGHT: 59 IN | OXYGEN SATURATION: 98 % | RESPIRATION RATE: 16 BRPM | BODY MASS INDEX: 35.08 KG/M2

## 2022-01-10 DIAGNOSIS — E78.5 HYPERLIPIDEMIA ASSOCIATED WITH TYPE 2 DIABETES MELLITUS (HCC): ICD-10-CM

## 2022-01-10 DIAGNOSIS — E11.69 HYPERLIPIDEMIA ASSOCIATED WITH TYPE 2 DIABETES MELLITUS (HCC): ICD-10-CM

## 2022-01-10 DIAGNOSIS — I15.2 HYPERTENSION ASSOCIATED WITH DIABETES (HCC): ICD-10-CM

## 2022-01-10 DIAGNOSIS — I24.0 RIGHT CORONARY ARTERY OCCLUSION (HCC): Primary | ICD-10-CM

## 2022-01-10 DIAGNOSIS — E11.59 HYPERTENSION ASSOCIATED WITH DIABETES (HCC): ICD-10-CM

## 2022-01-10 DIAGNOSIS — N18.4 STAGE 4 CHRONIC KIDNEY DISEASE (HCC): ICD-10-CM

## 2022-01-10 PROCEDURE — 1160F RVW MEDS BY RX/DR IN RCRD: CPT | Performed by: INTERNAL MEDICINE

## 2022-01-10 PROCEDURE — 1036F TOBACCO NON-USER: CPT | Performed by: INTERNAL MEDICINE

## 2022-01-10 PROCEDURE — 3066F NEPHROPATHY DOC TX: CPT | Performed by: INTERNAL MEDICINE

## 2022-01-10 PROCEDURE — 99214 OFFICE O/P EST MOD 30 MIN: CPT | Performed by: INTERNAL MEDICINE

## 2022-01-10 NOTE — PROGRESS NOTES
[facility] [address] [place] [address]  Cardiology Office Note    Dianna Alicia 78 y o  female MRN: [id number]    01/10/22          Assessment/Plan:  1  CAD  - Patient denies chest pain  - Cardiac catheterization 02/26/2020 showed 50-60% stenosis of mid LAD after origin of D2, 90% stenosis of proximal D2 and chronic total occlusion of proximal RCA  - continue Plavix, labetalol 300 mg daily  - patient unable to tolerate statins, had hematuria with fewer fibrate, PCSK9 inhibitor injections discussed with patient  Patient does not want initiate this therapy at this time however will look into medication and call office if she changes mind  2  Hypertension  - BP well controlled 138/78  -continue amlodipine 2 5 mg daily, chlorthalidone 25 mg daily, clonidine 0 3 mg patch weekly, losartan 100 mg daily and labetalol 300 mg daily  3  Hyperlipidemia  - patient unable to tolerate statins, had hematuria with fewer fibrate, PCSK9 inhibitor injections discussed with patient  Patient does not want initiate this therapy at this time however will look into medication and call office if she changes mind  4  CKD stage 4  - creatinine 1 94, patient deferring any procedures requiring contrast at this time  5  Diabetes type 2  - hemoglobin A1c 7 6    6  Atherosclerosis bilateral lower extremities  - patient on Plavix, patient follows with vascular surgery    7  Atherosclerosis of the renal artery  - patient on Plavix, monitor renal function  Follow up: 1 year or sooner as needed    Recommend aggressive risk factor modification and therapeutic lifestyle changes  Low-salt, low-calorie, low-fat, low-cholesterol diet with regular exercise and to optimize weight  Discussed concepts of atherosclerosis, including signs and symptoms of cardiac disease  Previous studies were reviewed  Safety measures were reviewed    All questions and concerns addressed  Patient was advised to report any problems requiring medical attention  1  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  Ambulatory referral to Cardiology   2  Hyperlipidemia associated with type 2 diabetes mellitus ([facility] )     3  Hypertension associated with diabetes ([facility] )     4  Stage 4 chronic kidney disease (HCC)         HPI: Shen Breen is a 78y o  year old female with PMH of CAD, hypertension, hyperlipidemia , stage III CKD, atherosclerosis of renal artery, atherosclerosis of lower extremities with intermittent claudication, type 2 diabetes, tobacco use who presents for follow-up  Patient states that she is overall feeling well  Patient denies any acute complaints at this time  She has chronic intermittent claudication  She was evaluated by vascular surgery but he does not want any surgery at this time due to contrast and her impaired renal function  Patient is on Plavix  BP appears to be well controlled  Some chest discomfort when eating  Discomfort correlates with meals and does not occur during exertion  No chest pain or heaviness or shortness of breath  Patient denies any lightheadedness, dizziness, or syncopal/presyncopal episodes  Patient endorses a balanced diet  She uses Splenda and sugar substitutes when being  She also denies adding salt when cooking or eating food  Patient denies any regular exercise due to intermittent claudication  Patient's lipid panel remains elevated  Discussed PCSK9 inhibitor injections since she is unable to tolerate statins and had hematuria with fenofibrate  Patient does not want to start the process of medication approval at this time however she states she will think about it and will let the office know if she changes her mind       The patient denies chest pain, dyspnea on exertion or rest, lower extremity edema, or palpitations and was instructed to call  the office or seek medical attention if any such symptoms develop  All medications reviewed and patient is tolerating medications without side effects  Family History: Mother- HTN, valvular heart disease, hyperlipidemia  Father- HTN, cardiomegaly  Sister-CVA  Brother- Arthritis    Social history: Former smoker, stopped 3 years ago  No ETOH  EKG- 3/24/20 Normal sinus rhythm  Left ventricular hypertrophy with repolarization abnormality  Anteroseptal infarct (cited on or before 04-NOV-2019)    Cardiac catheterization 02/26/2020  CORONARY CIRCULATION:  The coronary circulation is right dominant  Left main: The vessel was large sized  Angiography showed minor luminal irregularities  LAD: The vessel was large sized  Angiography showed moderate atherosclerosis  Mid LAD has 50-60% stenosis after origin of D2  D1 is a large caliber long vessel with moderate atherosclerosis without significant stenosis  D2 is small vessel with proximal 90% stenosis  Circumflex: The vessel was medium sized tortous and mildly calcified  Angiography showed moderate atherosclerosis without significant stenosis  OM1 is large caliber tortous vessel with luminal irregularities  RCA: There is chronic total occlusion of proximal RCA   Faint visualization of distal RCA filling seen from right to right and left to right collaterals  Patient Active Problem List   Diagnosis    Spinal stenosis    Basilar artery stenosis    Breast mass    Cerebrovascular accident (CVA) ([address] )    Chronic GERD    Stage 3 chronic kidney disease ([address] )    Claudication in peripheral vascular disease ([address] )    Type 2 diabetes mellitus with diabetic nephropathy ([address] )    Endometriosis    Gout    Hx-TIA (transient ischemic attack)    Hypercholesteremia    Hyperlipidemia associated with type 2 diabetes mellitus ([address] )    Hypertension    Hypertension associated with diabetes ([address] )    Osteoarthritis    Obesity (BMI 30-39  9)    Polyneuropathy    Right renal artery stenosis ([address] )    Tobacco use disorder    Vertebrobasilar artery syndrome    Vitamin D deficiency    Statin intolerance    Lumbar disc herniation    Pain of left lower extremity    Abnormal EKG    Spondylosis of lumbar region without myelopathy or radiculopathy    Aortoiliac stenosis, left (HCC)    Hypokalemia    Acute drug-induced gout of left foot    Decreased pulses in feet    Refused influenza vaccine    Hypercalcemia    Lumbosacral spondylosis without myelopathy    Neurodermatitis    Other proteinuria    Obesity with body mass index 30 or greater    Hyperlipidemia, unspecified    Herniated lumbar intervertebral disc    Atherosclerosis of renal artery (HCC)    Celiac artery stenosis (HCC) >70% stenosis in the celiac trunk    Abnormal CT of the chest suspicious for an infectious or inflammatory bronchiolitis   Positive cardiac stress test  small, mildly severe, partially reversible myocardial perfusion defect of anterior and inferior wall     Femoral artery stenosis, right (HCC) 50-75% stenosis in the common femoral artery      Mesenteric artery stenosis (HCC)    Immunization not carried out because of patient refusal    Median arcuate ligament syndrome ([facility] )    Abdominal bruit    Initial Medicare annual wellness visit    Hypertensive kidney disease with stage 3 chronic kidney disease ([address] )    Atherosclerosis of native arteries of extremities with intermittent claudication, bilateral legs (HCC)    Asymptomatic bilateral carotid artery stenosis    Pulmonary nodule 7 mm groundglass opacity in the right upper lobe, unchanged since October 2019    Stenosis of left anterior descending artery Mid LAD 50-60% stenosis    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    Urinary frequency    Malignant neoplasm of overlapping sites of bladder ([address] )    Encounter for removal of ureteral stent    Cervical radiculopathy    Edema of left lower extremity    Stage 4 chronic kidney disease ([facility] )    Hypertensive kidney disease with stage 4 chronic kidney disease (HCC)    Hyperuricemia    Embolism and 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 ([facility])    Depression, recurrent ([place] [address] )    Obesity, morbid ([place] [address] )   [facility] Person consulting for explanation of examination or test finding    Type 2 diabetes mellitus with chronic kidney disease ([facility] )    Type 2 diabetes mellitus with diabetic peripheral angiopathy without gangrene ([facility])       Allergies   Allergen Reactions    Fenofibrate Other (See Comments)      blood in urine  hx  Kidney Failure    Colesevelam Other (See Comments)      leg pains    Colestipol Itching and Other (See Comments)      Swelling lower legs    Ezetimibe GI Intolerance    Statins Myalgia         Current Outpatient Medications:     allopurinol (ZYLOPRIM) 100 mg tablet, TAKE 1 TABLET BY MOUTH EVERY DAY, Disp: 90 tablet, Rfl: 2    amLODIPine (NORVASC) 2 5 mg tablet, Take 1 tablet (2 5 mg total) by mouth daily, Disp: 90 tablet, Rfl: 2    chlorthalidone 25 mg tablet, Take 1 tablet (25 mg total) by mouth daily, Disp: 90 tablet, Rfl: 1    Cholecalciferol 50 MCG (2000 UT) TABS, Take 1 tablet (2,000 Units total) by mouth daily, Disp: , Rfl:     cloNIDine (CATAPRES-TTS-3) 0 3 mg/24 hr, PLACE 1 PATCH (0 3 MG TOTAL) ON THE SKIN ONCE A WEEK, Disp: 12 patch, Rfl: 2    clopidogrel (PLAVIX) 75 mg tablet, TAKE 1 TABLET BY MOUTH EVERY DAY, Disp: 90 tablet, Rfl: 1    Icosapent Ethyl (Vascepa) 1 g CAPS, 2 CAP TWICE A DAY WITH FOOD, Disp: 360 capsule, Rfl: 2    labetalol (NORMODYNE) 300 mg tablet, TAKE 1 TABLET BY MOUTH TWICE A DAY, Disp: 180 tablet, Rfl: 4    lidocaine (LIDODERM) 5 %, APPLY 1 PATCH TOPICALLY DAILY REMOVE & DISCARD PATCH WITHIN 12 HOURS OR AS DIRECTED BY MD, Disp: 30 patch, Rfl: 6    losartan (COZAAR) 100 MG tablet, TAKE 1 5 TABLETS BY MOUTH DAILY, Disp: 135 tablet, Rfl: 1    Past Medical History:   Diagnosis Date    Arthritis  Chronic kidney disease     Endometriosis     High cholesterol     Hypertension     Kidney disease, chronic, stage III (moderate, EGFR 30-59 ml/min) ([facility])     Lumbar disc herniation     Neuropathy     Peripheral vascular disease ([facility])     Pneumonia     Shoulder injury     left    Spinal stenosis     Stroke ([facility] [facility] ) 2015    Memory loss       Family History   Problem Relation Age of Onset    Hypertension Mother     Heart disease Mother         Valvular    Hyperlipidemia Mother     Hypertension Father     Heart defect Father         Cardiomegaly    Stroke Sister         Cerebrovascular Accident    Arthritis Brother     Other Brother         Back Disorder       Past Surgical History:   Procedure Laterality Date    CARDIAC CATHETERIZATION      COLONOSCOPY      FL RETROGRADE PYELOGRAM  4/6/2020    FRACTURE SURGERY Right     ankle    OVARIAN CYST SURGERY      KS CYSTOSCOPY,INSERT URETERAL STENT Right 4/6/2020    Procedure: INSERTION STENT URETERAL;  Surgeon: Laura Goss MD;  [facility];  Service: Urology    KS CYSTOURETHROSCOPY,FULGUR 0 5-2 CM LESN N/A 4/6/2020    Procedure: TRANSURETHRAL RESECTION OF BLADDER TUMOR (TURBT);   Surgeon: Laura Goss MD;  [facility];  Service: Urology    KS CYSTOURETHROSCOPY,URETER CATHETER Bilateral 4/6/2020    Procedure: CYSTOSCOPY WITH RETROGRADE PYELOGRAM;  Surgeon: Laura Goss MD;  [facility];  Service: Urology    ROTATOR CUFF REPAIR Left     TONSILLECTOMY         Social History     Socioeconomic History    Marital status: /Civil Union     Spouse name: Not on file    Number of children: Not on file    Years of education: Not on file    Highest education level: Not on file   Occupational History    Occupation: retired   Tobacco Use    Smoking status: Former Smoker     Packs/day: 2 00     Years: 40 00     Pack years: 80 00     Types: Cigarettes     Start date: 1966     Quit date: 7/27/2020     Years since quittin 4    Smokeless tobacco: Never Used   Vaping Use    Vaping Use: Never used   Substance and Sexual Activity    Alcohol use: No    Drug use: No    Sexual activity: Not Currently     Partners: Male   Other Topics Concern    Not on file   Social History Narrative    Occasional Caffeine Consumption     Social Determinants of Health     Financial Resource Strain: Not on file   Food Insecurity: Not on file   Transportation Needs: Not on file   Physical Activity: Not on file   Stress: Not on file   Social Connections: Not on file   Intimate Partner Violence: Not on file   Housing Stability: Not on file       Review of symptoms:   Review of Systems   Constitutional: Negative for chills, diaphoresis and fever  Respiratory: Negative for cough, chest tightness and shortness of breath  Cardiovascular: Negative for chest pain, palpitations and leg swelling  Gastrointestinal: Negative for abdominal distention, blood in stool, nausea and vomiting  Genitourinary: Negative for difficulty urinating  Musculoskeletal: Positive for arthralgias  Negative for back pain  Neurological: Negative for dizziness, syncope, light-headedness and headaches  Psychiatric/Behavioral: Negative for agitation and confusion  The patient is not nervous/anxious  Vitals: /78 (BP Location: Left arm, Patient Position: Sitting, Cuff Size: Standard)   Pulse 68   Resp 16   Ht 4' 11" (1 499 m)   Wt 78 9 kg (174 lb)   LMP  (LMP Unknown)   SpO2 98%   BMI 35 14 kg/m²         Physical Exam:     Physical Exam  Vitals and nursing note reviewed  Constitutional:       General: She is not in acute distress  Appearance: She is well-developed  She is obese  HENT:      Head: Normocephalic and atraumatic  Eyes:      Conjunctiva/sclera: Conjunctivae normal    Cardiovascular:      Rate and Rhythm: Normal rate and regular rhythm        Heart sounds: Normal heart sounds, S1 normal and S2 normal  No murmur heard       Pulmonary:      Effort: Pulmonary effort is normal  No respiratory distress  Breath sounds: Normal breath sounds  Abdominal:      Palpations: Abdomen is soft  Tenderness: There is no abdominal tenderness  Musculoskeletal:      Cervical back: Neck supple  Right lower leg: No edema  Left lower leg: No edema  Skin:     General: Skin is warm and dry  Coloration: Skin is pale  Neurological:      Mental Status: She is alert and oriented to person, place, and time  Gait: Gait abnormal       Comments: Uses cane to ambulate   Psychiatric:         Mood and Affect: Mood normal          Behavior: Behavior normal             Thank you for allowing me to participate in the care and evaluation of your patient  Should you have any questions, please feel free to contact me  I personally spent over half of a total 35 minutes in counseling and discussion with the patient and coordination of care as described above

## 2022-01-17 ENCOUNTER — RA CDI HCC (OUTPATIENT)
Dept: OTHER | Facility: HOSPITAL | Age: 77
End: 2022-01-17

## 2022-01-17 ENCOUNTER — TELEMEDICINE (OUTPATIENT)
Dept: INTERNAL MEDICINE CLINIC | Facility: CLINIC | Age: 77
End: 2022-01-17
Payer: COMMERCIAL

## 2022-01-17 DIAGNOSIS — B34.9 VIRAL INFECTION, UNSPECIFIED: Primary | ICD-10-CM

## 2022-01-17 PROCEDURE — 99442 PR PHYS/QHP TELEPHONE EVALUATION 11-20 MIN: CPT | Performed by: NURSE PRACTITIONER

## 2022-01-17 NOTE — PATIENT INSTRUCTIONS
Viral symptoms- Come to office tomorrow for Covid test  Ok to manage symptoms with OTC medication such as Tylenol  We will call you with results

## 2022-01-17 NOTE — PROGRESS NOTES
COVID-19 Outpatient Progress Note    Assessment/Plan:    Problem List Items Addressed This Visit     None      Visit Diagnoses     Viral infection, unspecified    -  Primary    Relevant Orders    COVID Only - Office Collect         Viral symptoms- Come to office tomorrow for Covid test  Ok to manage symptoms with OTC medication such as Tylenol  We will call you with results  Disposition:     Recommended patient to come to the office to test for COVID-19  Patient is fully vaccinated and I recommended self quarantine for 5 days followed by strict mask use for an additional 5 days  If patient were to develop symptoms, they should immediately self isolate and call our office for further guidance  I have spent 15 minutes directly with the patient  Greater than 50% of this time was spent in counseling/coordination of care regarding: instructions for management, patient and family education and impressions  Encounter provider Chrissy Del Toro, [facility]    Provider located at [address] [place] [address] [address] [place] [address] 2-3  [address] [facility] [id number] [phone]    Recent Visits  No visits were found meeting these conditions  Showing recent visits within past 7 days and meeting all other requirements  Today's Visits  Date Type Provider Dept   01/17/22 Telemedicine Chrissy Del Toro, [facility] [address] today's visits and meeting all other requirements  Future Appointments  No visits were found meeting these conditions  Showing future appointments within next 150 days and meeting all other requirements     This virtual check-in was done via telephone and she agrees to proceed  Patient agrees to participate in a virtual check in via telephone or video visit instead of presenting to the office to address urgent/immediate medical needs  Patient is aware this is a billable service      After connecting through Telephone, the patient was identified by name and date of birth  Elisabeth Hull was informed that this was a telemedicine visit and that the exam was being conducted confidentially over secure lines  My office door was closed  No one else was in the room  Elisabeth Hull acknowledged consent and understanding of privacy and security of the telemedicine visit  I informed the patient that I have reviewed her record in Epic and presented the opportunity for her to ask any questions regarding the visit today  The patient agreed to participate  It was my intent to perform this visit via video technology but the patient was not able to do a video connection so the visit was completed via audio telephone only  Verification of patient location:  Patient is located in the following state in which I hold an active license: [place]    Subjective:   Elisabeth Hull is a 78 y o  female who is concerned about COVID-19  Patient's symptoms include fatigue, nasal congestion, rhinorrhea, cough, chest tightness, diarrhea and myalgias       - Date of symptom onset: 1/10/2022      COVID-19 vaccination status: Fully vaccinated (primary series)    Exposure:   Contact with a person who is under investigation (PUI) for or who is positive for COVID-19 within the last 14 days?: No    Hospitalized recently for fever and/or lower respiratory symptoms?: No      Currently a healthcare worker that is involved in direct patient care?: No      Works in a special setting where the risk of COVID-19 transmission may be high? (this may include long-term care, correctional and FPC facilities; homeless shelters; assisted-living facilities and group homes ): No      Resident in a special setting where the risk of COVID-19 transmission may be high? (this may include long-term care, correctional and FPC facilities; homeless shelters; assisted-living facilities and group homes ): No      No results found for: [address] [facility], [facility], [address] [facility], [facility] 116, 350 [facility]  Past Medical History: Diagnosis Date    Arthritis     Chronic kidney disease     Endometriosis     High cholesterol     Hypertension     Kidney disease, chronic, stage III (moderate, EGFR 30-59 ml/min) (HCC)     Lumbar disc herniation     Neuropathy     Peripheral vascular disease (HCC)     Pneumonia     Shoulder injury     left    Spinal stenosis     St[facility] ([facility] ) 2015    Memory loss     Past Surgical History:   Procedure Laterality Date    CARDIAC CATHETERIZATION      COLONOSCOPY      FL RETROGRADE PYELOGRAM  4/6/2020    FRACTURE SURGERY Right     ankle    OVARIAN CYST SURGERY      IL CYSTOSCOPY,INSERT URETERAL STENT Right 4/6/2020    Procedure: INSERTION STENT URETERAL;  Surgeon: Marta Penn MD;  [facility];  Service: Urology    IL CYSTOURETHROSCOPY,FULGUR 0 5-2 CM LESN N/A 4/6/2020    Procedure: TRANSURETHRAL RESECTION OF BLADDER TUMOR (TURBT);   Surgeon: Marta Penn MD;  [facility];  Service: Urology    IL CYSTOURETHROSCOPY,URETER CATHETER Bilateral 4/6/2020    Procedure: CYSTOSCOPY WITH RETROGRADE PYELOGRAM;  Surgeon: Marta Penn MD;  [facility];  Service: Urology    ROTATOR CUFF REPAIR Left     TONSILLECTOMY       Current Outpatient Medications   Medication Sig Dispense Refill    allopurinol (ZYLOPRIM) 100 mg tablet TAKE 1 TABLET BY MOUTH EVERY DAY 90 tablet 2    amLODIPine (NORVASC) 2 5 mg tablet Take 1 tablet (2 5 mg total) by mouth daily 90 tablet 2    chlorthalidone 25 mg tablet Take 1 tablet (25 mg total) by mouth daily 90 tablet 1    Cholecalciferol 50 MCG (2000 UT) TABS Take 1 tablet (2,000 Units total) by mouth daily      cloNIDine (CATAPRES-TTS-3) 0 3 mg/24 hr PLACE 1 PATCH (0 3 MG TOTAL) ON THE SKIN ONCE A WEEK 12 patch 2    clopidogrel (PLAVIX) 75 mg tablet TAKE 1 TABLET BY MOUTH EVERY DAY 90 tablet 1    Icosapent Ethyl (Vascepa) 1 g CAPS 2 CAP TWICE A DAY WITH FOOD 360 capsule 2    labetalol (NORMODYNE) 300 mg tablet TAKE 1 TABLET BY MOUTH TWICE A  tablet 4    lidocaine (LIDODERM) 5 % APPLY 1 PATCH TOPICALLY DAILY REMOVE & DISCARD PATCH WITHIN 12 HOURS OR AS DIRECTED BY MD 30 patch 6    losartan (COZAAR) 100 MG tablet TAKE 1 5 TABLETS BY MOUTH DAILY 135 tablet 1     No current facility-administered medications for this visit  Allergies   Allergen Reactions    Fenofibrate Other (See Comments)      blood in urine  hx  Kidney Failure    Colesevelam Other (See Comments)      leg pains    Colestipol Itching and Other (See Comments)      Swelling lower legs    Ezetimibe GI Intolerance    Statins Myalgia       Review of Systems   Constitutional: Positive for fatigue  HENT: Positive for congestion and rhinorrhea  Respiratory: Positive for cough and chest tightness  Cardiovascular: Negative  Gastrointestinal: Positive for diarrhea  Musculoskeletal: Positive for myalgias  Psychiatric/Behavioral: Negative  Objective: There were no vitals filed for this visit  Physical Exam  Constitutional:       Appearance: Normal appearance  Neurological:      Mental Status: She is alert and oriented to person, place, and time  Psychiatric:         Behavior: Behavior normal          VIRTUAL VISIT [address] verbally agrees to participate in [employer]  Pt is aware that [employer] could be limited without vital signs or the ability to perform a full hands-on physical Miles Brow understands she or the provider may request at any time to terminate the video visit and request the patient to seek care or treatment in person

## 2022-01-17 NOTE — PROGRESS NOTES
[facility]  coding opportunities        [address] 22, [address] 51, [address] 9 and [address] 81     Chart Reviewed * (Number of) Inbasket suggestions sent to Provider: 4                  Patients insurance company: [address]  (Medicare Advantage and Commercial)

## 2022-01-18 PROCEDURE — U0005 INFEC AGEN DETEC AMPLI PROBE: HCPCS | Performed by: NURSE PRACTITIONER

## 2022-01-18 PROCEDURE — U0003 INFECTIOUS AGENT DETECTION BY NUCLEIC ACID (DNA OR RNA); SEVERE ACUTE RESPIRATORY SYNDROME CORONAVIRUS 2 (SARS-COV-2) (CORONAVIRUS DISEASE [COVID-19]), AMPLIFIED PROBE TECHNIQUE, MAKING USE OF HIGH THROUGHPUT TECHNOLOGIES AS DESCRIBED BY CMS-2020-01-R: HCPCS | Performed by: NURSE PRACTITIONER

## 2022-01-19 ENCOUNTER — TELEPHONE (OUTPATIENT)
Dept: INTERNAL MEDICINE CLINIC | Facility: CLINIC | Age: 77
End: 2022-01-19

## 2022-01-19 ENCOUNTER — TELEMEDICINE (OUTPATIENT)
Dept: INTERNAL MEDICINE CLINIC | Facility: CLINIC | Age: 77
End: 2022-01-19
Payer: COMMERCIAL

## 2022-01-19 VITALS — HEIGHT: 59 IN | BODY MASS INDEX: 35.08 KG/M2 | WEIGHT: 174 LBS

## 2022-01-19 DIAGNOSIS — U07.1 COVID: Primary | ICD-10-CM

## 2022-01-19 LAB — SARS-COV-2 RNA RESP QL NAA+PROBE: POSITIVE

## 2022-01-19 PROCEDURE — 99441 PR PHYS/QHP TELEPHONE EVALUATION 5-10 MIN: CPT | Performed by: INTERNAL MEDICINE

## 2022-01-19 NOTE — TELEPHONE ENCOUNTER
[place] [facility] called -  [id number]      [facility]    They wanted to confirm we received 11 pages of stuff from them on this pt    Also they wanted a call back from a nurse to discuss things needed in notes for her to get durable medical equipment at an appnt that was cancelled for now ( due to covid)

## 2022-01-19 NOTE — PROGRESS NOTES
COVID-19 Outpatient Progress Note    Assessment/Plan:    Pt is Covid +; + test yesterday with s/s beginning on 1/10  She states her symptoms have improved but still poor appetite, not drinking enough  Trying to drink water  Her son is worried  No fever, chills  No cough  No need for ABT  Recommended Gatorade or Pedialyte  Soups and jello's  Plenty of rest     Problem List Items Addressed This Visit     None      Visit Diagnoses     COVID    -  Primary         Disposition:     Patient has COVID-19 infection  Based off CDC guidelines, they were recommended to isolate for 5 days from the date of the positive test  If they remain asymptomatic, isolation may be ended followed by 5 days of wearing a mask when around othes to minimize risk of infecting others  If they have a fever, continue to stay home until fever resolves for at least 24 hours  I recommended continued isolation until at least 24 hours have passed since recovery defined as resolution of fever without the use of fever-reducing medications AND improvement in COVID symptoms AND 10 days have passed since onset of symptoms (or 10 days have passed since date of first positive viral diagnostic test for asymptomatic patients)  I have spent 10 minutes directly with the patient  Greater than 50% of this time was spent in counseling/coordination of care regarding: instructions for management        Encounter provider Andreas Santana MD    Provider located at [address] [place] [address]  [address]  [address] [place] [id number] [phone]    Recent Visits  Date Type Provider Dept   01/17/22 Telemedicine Andrade Abad, [address] [place] [address] [facility] Med [facility]   Showing recent visits within past 7 days and meeting all other requirements  Today's Visits  Date Type Provider Dept   01/19/22 Telemedicine Andreas Santana MD  [facility]   01/19/22 Telephone [id number] [facility] Internal Med [facility] Showing today's visits and meeting all other requirements  Future Appointments  No visits were found meeting these conditions  Showing future appointments within next 150 days and meeting all other requirements     This virtual check-in was done via telephone and she agrees to proceed  Patient agrees to participate in a virtual check in via telephone or video visit instead of presenting to the office to address urgent/immediate medical needs  Patient is aware this is a billable service  After connecting through Telephone, the patient was identified by name and date of birth  Liss Aburto was informed that this was a telemedicine visit and that the exam was being conducted confidentially over secure lines  My office door was closed  No one else was in the room  Liss Aburto acknowledged consent and understanding of privacy and security of the telemedicine visit  I informed the patient that I have reviewed her record in Epic and presented the opportunity for her to ask any questions regarding the visit today  The patient agreed to participate  It was my intent to perform this visit via video technology but the patient was not able to do a video connection so the visit was completed via audio telephone only  Verification of patient location:  Patient is located in the following state in which I hold an active license: [place]    Subjective:   Liss Aburto is a 78 y o  female who has been screened for COVID-19  Symptom change since last report: improving  Patient's symptoms include fatigue, malaise, nasal congestion and cough  - Date of symptom onset: 1/10/2022  - Date of positive COVID-19 test: 1/18/2022  Type of test: PCR  COVID-19 vaccination status: Fully vaccinated (primary series)    Marco Santos has been staying home and has isolated themselves in her home   She is taking care to not share personal items and is cleaning all surfaces that are touched often, like counters, tabletops, and doorknobs using household cleaning sprays or wipes  She is wearing a mask when she leaves her room  Lab Results   Component Value Date    SARSCOV2 Positive (A) 01/18/2022     Past Medical History:   Diagnosis Date    Arthritis     Chronic kidney disease     Endometriosis     High cholesterol     Hypertension     Kidney disease, chronic, stage III (moderate, EGFR 30-59 ml/min) (HCC)     Lumbar disc herniation     Neuropathy     Peripheral vascular disease (HCC)     Pneumonia     Shoulder injury     left    Spinal stenosis     Stroke ([facility] [address] ) 2015    Memory loss     Past Surgical History:   Procedure Laterality Date    CARDIAC CATHETERIZATION      COLONOSCOPY      FL RETROGRADE PYELOGRAM  4/6/2020    FRACTURE SURGERY Right     ankle    OVARIAN CYST SURGERY      NV CYSTOSCOPY,INSERT URETERAL STENT Right 4/6/2020    Procedure: INSERTION STENT URETERAL;  Surgeon: Vernon Goltz, MD;  [facility];  Service: Urology    NV CYSTOURETHROSCOPY,FULGUR 0 5-2 CM LESN N/A 4/6/2020    Procedure: TRANSURETHRAL RESECTION OF BLADDER TUMOR (TURBT);   Surgeon: Vernon Goltz, MD;  [facility];  Service: Urology    NV CYSTOURETHROSCOPY,URETER CATHETER Bilateral 4/6/2020    Procedure: CYSTOSCOPY WITH RETROGRADE PYELOGRAM;  Surgeon: Vernon Goltz, MD;  [facility];  Service: Urology    ROTATOR CUFF REPAIR Left     TONSILLECTOMY       Current Outpatient Medications   Medication Sig Dispense Refill    allopurinol (ZYLOPRIM) 100 mg tablet TAKE 1 TABLET BY MOUTH EVERY DAY 90 tablet 2    amLODIPine (NORVASC) 2 5 mg tablet Take 1 tablet (2 5 mg total) by mouth daily 90 tablet 2    chlorthalidone 25 mg tablet Take 1 tablet (25 mg total) by mouth daily 90 tablet 1    Cholecalciferol 50 MCG (2000 UT) TABS Take 1 tablet (2,000 Units total) by mouth daily      cloNIDine (CATAPRES-TTS-3) 0 3 mg/24 hr PLACE 1 PATCH (0 3 MG TOTAL) ON THE SKIN ONCE A WEEK 12 patch 2    clopidogrel (PLAVIX) 75 mg tablet TAKE 1 TABLET BY MOUTH EVERY DAY 90 tablet 1    Icosapent Ethyl (Vascepa) 1 g CAPS 2 CAP TWICE A DAY WITH FOOD 360 capsule 2    labetalol (NORMODYNE) 300 mg tablet TAKE 1 TABLET BY MOUTH TWICE A  tablet 4    lidocaine (LIDODERM) 5 % APPLY 1 PATCH TOPICALLY DAILY REMOVE & DISCARD PATCH WITHIN 12 HOURS OR AS DIRECTED BY MD 30 patch 6    losartan (COZAAR) 100 MG tablet TAKE 1 5 TABLETS BY MOUTH DAILY 135 tablet 1     No current facility-administered medications for this visit  Allergies   Allergen Reactions    Fenofibrate Other (See Comments)      blood in urine  hx  Kidney Failure    Colesevelam Other (See Comments)      leg pains    Colestipol Itching and Other (See Comments)      Swelling lower legs    Ezetimibe GI Intolerance    Statins Myalgia       Review of Systems   Constitutional: Positive for appetite change and fatigue  Decreased appetite   HENT: Positive for congestion  Respiratory: Positive for cough  All other systems reviewed and are negative  Objective:    Vitals:    01/19/22 1555   Weight: 78 9 kg (174 lb)   Height: 4' 11" (1 499 m)       Physical Exam  Vitals reviewed  Pulmonary:      Effort: Pulmonary effort is normal    Neurological:      Mental Status: She is alert and oriented to person, place, and time  Psychiatric:         Mood and Affect: Mood normal          VIRTUAL VISIT DISCLAIMER    Liss Aburto verbally agrees to participate in [employer]  Pt is aware that [employer] could be limited without vital signs or the ability to perform a full hands-on physical Wyatt Ashford understands she or the provider may request at any time to terminate the video visit and request the patient to seek care or treatment in person

## 2022-01-20 ENCOUNTER — TRANSCRIBE ORDERS (OUTPATIENT)
Dept: VASCULAR SURGERY | Facility: CLINIC | Age: 77
End: 2022-01-20

## 2022-01-20 ENCOUNTER — TELEPHONE (OUTPATIENT)
Dept: VASCULAR SURGERY | Facility: CLINIC | Age: 77
End: 2022-01-20

## 2022-01-20 DIAGNOSIS — I65.23 ASYMPTOMATIC BILATERAL CAROTID ARTERY STENOSIS: Primary | ICD-10-CM

## 2022-01-20 NOTE — TELEPHONE ENCOUNTER
Attempted to contact patient to schedule appointment(s) listed below  Requested patient call [phone] option 3 to schedule appointment(s)  Patient's appointment(s) are past due, expected ASAP      Dopplers  [] Abdominal Aorta Iliac (AOIL)  [x] Carotid (CV)   [] Celiac and/or Mesenteric  [] Endovascular Aortic Repair (EVAR)   [] Hemodialysis Access (HD)   [] Lower Limb Arterial (TEA)  [] Lower Limb Venous Duplex with Reflux (LEVDR)  [] Renal Artery  [] Upper Limb (UEA)    Advanced Imaging   [] CTA abdomen    [] CTA abdomen & pelvis    [] CT abdomen with/ without contrast  [] CT abdomen with contrast  [] CT abdomen without contrast    [] CT abdomen & pelvis with/ without contrast  [] CT abdomen & pelvis with contrast  [] CT abdomen & pelvis without contrast    Office Visit   [] New patient, patient last seen over 3 years ago  [] Risk factor modification (RFM)   [] Follow up

## 2022-01-21 ENCOUNTER — TELEPHONE (OUTPATIENT)
Dept: INTERNAL MEDICINE CLINIC | Facility: CLINIC | Age: 77
End: 2022-01-21

## 2022-01-21 NOTE — TELEPHONE ENCOUNTER
Pt has a lot of bed sores but being covid bed ridden lately    Using aloe and saline solution and a cushion to help but she has open sore bed sores and is in pain from this? Amaya Darden - son      Is a physical appt to be made for her at this point?   She should be off covid quarentine by now they state    [facility]

## 2022-01-22 ENCOUNTER — APPOINTMENT (EMERGENCY)
Dept: RADIOLOGY | Facility: HOSPITAL | Age: 77
DRG: 682 | End: 2022-01-22
Payer: COMMERCIAL

## 2022-01-22 ENCOUNTER — HOSPITAL ENCOUNTER (INPATIENT)
Facility: HOSPITAL | Age: 77
LOS: 3 days | Discharge: HOME HEALTH | DRG: 682 | End: 2022-01-25
Attending: EMERGENCY MEDICINE | Admitting: INTERNAL MEDICINE
Payer: COMMERCIAL

## 2022-01-22 ENCOUNTER — APPOINTMENT (EMERGENCY)
Dept: CT IMAGING | Facility: HOSPITAL | Age: 77
DRG: 682 | End: 2022-01-22
Payer: COMMERCIAL

## 2022-01-22 DIAGNOSIS — N17.9 AKI (ACUTE KIDNEY INJURY) (HCC): ICD-10-CM

## 2022-01-22 DIAGNOSIS — R62.7 FAILURE TO THRIVE IN ADULT: ICD-10-CM

## 2022-01-22 DIAGNOSIS — K31.89 GASTRIC MASS: ICD-10-CM

## 2022-01-22 DIAGNOSIS — C49.A0 GASTROINTESTINAL STROMAL TUMOR (GIST) (HCC): ICD-10-CM

## 2022-01-22 DIAGNOSIS — L89.309 DECUBITUS ULCER OF BUTTOCK: Primary | ICD-10-CM

## 2022-01-22 PROBLEM — L98.429 SACRAL ULCER (HCC): Status: ACTIVE | Noted: 2022-01-22

## 2022-01-22 PROBLEM — N39.0 UTI (URINARY TRACT INFECTION): Status: ACTIVE | Noted: 2022-01-22

## 2022-01-22 PROBLEM — U07.1 COVID-19: Status: ACTIVE | Noted: 2022-01-22

## 2022-01-22 LAB
2HR DELTA HS TROPONIN: -1 NG/L
4HR DELTA HS TROPONIN: -1 NG/L
ALBUMIN SERPL BCP-MCNC: 2.9 G/DL (ref 3.5–5)
ALP SERPL-CCNC: 103 U/L (ref 46–116)
ALT SERPL W P-5'-P-CCNC: 15 U/L (ref 12–78)
ANION GAP SERPL CALCULATED.3IONS-SCNC: 14 MMOL/L (ref 4–13)
APTT PPP: 29 SECONDS (ref 23–37)
AST SERPL W P-5'-P-CCNC: 22 U/L (ref 5–45)
ATRIAL RATE: 61 BPM
BASE EX.OXY STD BLDV CALC-SCNC: 88.5 % (ref 60–80)
BASE EXCESS BLDV CALC-SCNC: -3.2 MMOL/L
BASOPHILS # BLD AUTO: 0.03 THOUSANDS/ΜL (ref 0–0.1)
BASOPHILS NFR BLD AUTO: 0 % (ref 0–1)
BILIRUB SERPL-MCNC: 0.54 MG/DL (ref 0.2–1)
BUN SERPL-MCNC: 132 MG/DL (ref 5–25)
CALCIUM ALBUM COR SERPL-MCNC: 10.1 MG/DL (ref 8.3–10.1)
CALCIUM SERPL-MCNC: 9.2 MG/DL (ref 8.3–10.1)
CARDIAC TROPONIN I PNL SERPL HS: 12 NG/L
CARDIAC TROPONIN I PNL SERPL HS: 12 NG/L
CARDIAC TROPONIN I PNL SERPL HS: 13 NG/L
CHLORIDE SERPL-SCNC: 99 MMOL/L (ref 100–108)
CO2 SERPL-SCNC: 22 MMOL/L (ref 21–32)
CREAT SERPL-MCNC: 2.84 MG/DL (ref 0.6–1.3)
EOSINOPHIL # BLD AUTO: 0.06 THOUSAND/ΜL (ref 0–0.61)
EOSINOPHIL NFR BLD AUTO: 1 % (ref 0–6)
ERYTHROCYTE [DISTWIDTH] IN BLOOD BY AUTOMATED COUNT: 13.1 % (ref 11.6–15.1)
GFR SERPL CREATININE-BSD FRML MDRD: 15 ML/MIN/1.73SQ M
GLUCOSE SERPL-MCNC: 261 MG/DL (ref 65–140)
GLUCOSE SERPL-MCNC: 360 MG/DL (ref 65–140)
HCO3 BLDV-SCNC: 21 MMOL/L (ref 24–30)
HCT VFR BLD AUTO: 27.8 % (ref 34.8–46.1)
HGB BLD-MCNC: 9.6 G/DL (ref 11.5–15.4)
IMM GRANULOCYTES # BLD AUTO: 0.12 THOUSAND/UL (ref 0–0.2)
IMM GRANULOCYTES NFR BLD AUTO: 2 % (ref 0–2)
INR PPP: 1.07 (ref 0.84–1.19)
LACTATE SERPL-SCNC: 1.6 MMOL/L (ref 0.5–2)
LIPASE SERPL-CCNC: 118 U/L (ref 73–393)
LYMPHOCYTES # BLD AUTO: 0.81 THOUSANDS/ΜL (ref 0.6–4.47)
LYMPHOCYTES NFR BLD AUTO: 12 % (ref 14–44)
MAGNESIUM SERPL-MCNC: 2.2 MG/DL (ref 1.6–2.6)
MCH RBC QN AUTO: 29.8 PG (ref 26.8–34.3)
MCHC RBC AUTO-ENTMCNC: 34.5 G/DL (ref 31.4–37.4)
MCV RBC AUTO: 86 FL (ref 82–98)
MONOCYTES # BLD AUTO: 0.8 THOUSAND/ΜL (ref 0.17–1.22)
MONOCYTES NFR BLD AUTO: 12 % (ref 4–12)
NEUTROPHILS # BLD AUTO: 4.93 THOUSANDS/ΜL (ref 1.85–7.62)
NEUTS SEG NFR BLD AUTO: 73 % (ref 43–75)
NRBC BLD AUTO-RTO: 0 /100 WBCS
NT-PROBNP SERPL-MCNC: 1302 PG/ML
O2 CT BLDV-SCNC: 13.1 ML/DL
P AXIS: 84 DEGREES
PCO2 BLDV: 34.6 MM HG (ref 42–50)
PH BLDV: 7.4 [PH] (ref 7.3–7.4)
PLATELET # BLD AUTO: 237 THOUSANDS/UL (ref 149–390)
PMV BLD AUTO: 10.8 FL (ref 8.9–12.7)
PO2 BLDV: 56.9 MM HG (ref 35–45)
POTASSIUM SERPL-SCNC: 3.9 MMOL/L (ref 3.5–5.3)
PR INTERVAL: 228 MS
PROCALCITONIN SERPL-MCNC: 0.59 NG/ML
PROT SERPL-MCNC: 7.1 G/DL (ref 6.4–8.2)
PROTHROMBIN TIME: 13.5 SECONDS (ref 11.6–14.5)
QRS AXIS: -14 DEGREES
QRSD INTERVAL: 98 MS
QT INTERVAL: 452 MS
QTC INTERVAL: 455 MS
RBC # BLD AUTO: 3.22 MILLION/UL (ref 3.81–5.12)
SODIUM SERPL-SCNC: 135 MMOL/L (ref 136–145)
T WAVE AXIS: 88 DEGREES
VENTRICULAR RATE: 61 BPM
WBC # BLD AUTO: 6.75 THOUSAND/UL (ref 4.31–10.16)

## 2022-01-22 PROCEDURE — 85730 THROMBOPLASTIN TIME PARTIAL: CPT | Performed by: EMERGENCY MEDICINE

## 2022-01-22 PROCEDURE — 80053 COMPREHEN METABOLIC PANEL: CPT | Performed by: EMERGENCY MEDICINE

## 2022-01-22 PROCEDURE — 82948 REAGENT STRIP/BLOOD GLUCOSE: CPT

## 2022-01-22 PROCEDURE — 87040 BLOOD CULTURE FOR BACTERIA: CPT | Performed by: EMERGENCY MEDICINE

## 2022-01-22 PROCEDURE — 74176 CT ABD & PELVIS W/O CONTRAST: CPT

## 2022-01-22 PROCEDURE — 99285 EMERGENCY DEPT VISIT HI MDM: CPT

## 2022-01-22 PROCEDURE — 36415 COLL VENOUS BLD VENIPUNCTURE: CPT | Performed by: EMERGENCY MEDICINE

## 2022-01-22 PROCEDURE — 84484 ASSAY OF TROPONIN QUANT: CPT | Performed by: EMERGENCY MEDICINE

## 2022-01-22 PROCEDURE — 85610 PROTHROMBIN TIME: CPT | Performed by: EMERGENCY MEDICINE

## 2022-01-22 PROCEDURE — 83690 ASSAY OF LIPASE: CPT | Performed by: EMERGENCY MEDICINE

## 2022-01-22 PROCEDURE — 71045 X-RAY EXAM CHEST 1 VIEW: CPT

## 2022-01-22 PROCEDURE — 93005 ELECTROCARDIOGRAM TRACING: CPT

## 2022-01-22 PROCEDURE — 82805 BLOOD GASES W/O2 SATURATION: CPT | Performed by: EMERGENCY MEDICINE

## 2022-01-22 PROCEDURE — 85025 COMPLETE CBC W/AUTO DIFF WBC: CPT | Performed by: EMERGENCY MEDICINE

## 2022-01-22 PROCEDURE — 99285 EMERGENCY DEPT VISIT HI MDM: CPT | Performed by: EMERGENCY MEDICINE

## 2022-01-22 PROCEDURE — 99223 1ST HOSP IP/OBS HIGH 75: CPT | Performed by: INTERNAL MEDICINE

## 2022-01-22 PROCEDURE — 83880 ASSAY OF NATRIURETIC PEPTIDE: CPT | Performed by: EMERGENCY MEDICINE

## 2022-01-22 PROCEDURE — 83735 ASSAY OF MAGNESIUM: CPT | Performed by: EMERGENCY MEDICINE

## 2022-01-22 PROCEDURE — 93010 ELECTROCARDIOGRAM REPORT: CPT | Performed by: INTERNAL MEDICINE

## 2022-01-22 PROCEDURE — G1004 CDSM NDSC: HCPCS

## 2022-01-22 PROCEDURE — 83605 ASSAY OF LACTIC ACID: CPT | Performed by: EMERGENCY MEDICINE

## 2022-01-22 PROCEDURE — 84145 PROCALCITONIN (PCT): CPT | Performed by: EMERGENCY MEDICINE

## 2022-01-22 RX ORDER — CLOPIDOGREL BISULFATE 75 MG/1
75 TABLET ORAL DAILY
Status: DISCONTINUED | OUTPATIENT
Start: 2022-01-23 | End: 2022-01-25 | Stop reason: HOSPADM

## 2022-01-22 RX ORDER — ONDANSETRON 2 MG/ML
4 INJECTION INTRAMUSCULAR; INTRAVENOUS EVERY 6 HOURS PRN
Status: DISCONTINUED | OUTPATIENT
Start: 2022-01-22 | End: 2022-01-25 | Stop reason: HOSPADM

## 2022-01-22 RX ORDER — PANTOPRAZOLE SODIUM 40 MG/1
40 TABLET, DELAYED RELEASE ORAL
Status: DISCONTINUED | OUTPATIENT
Start: 2022-01-23 | End: 2022-01-25 | Stop reason: HOSPADM

## 2022-01-22 RX ORDER — HEPARIN SODIUM 5000 [USP'U]/ML
5000 INJECTION, SOLUTION INTRAVENOUS; SUBCUTANEOUS EVERY 8 HOURS SCHEDULED
Status: DISCONTINUED | OUTPATIENT
Start: 2022-01-22 | End: 2022-01-25 | Stop reason: HOSPADM

## 2022-01-22 RX ORDER — LOSARTAN POTASSIUM 50 MG/1
100 TABLET ORAL DAILY
Status: DISCONTINUED | OUTPATIENT
Start: 2022-01-23 | End: 2022-01-22

## 2022-01-22 RX ORDER — MAGNESIUM HYDROXIDE/ALUMINUM HYDROXICE/SIMETHICONE 120; 1200; 1200 MG/30ML; MG/30ML; MG/30ML
30 SUSPENSION ORAL EVERY 6 HOURS PRN
Status: DISCONTINUED | OUTPATIENT
Start: 2022-01-22 | End: 2022-01-25 | Stop reason: HOSPADM

## 2022-01-22 RX ORDER — CLONIDINE 0.3 MG/24H
1 PATCH, EXTENDED RELEASE TRANSDERMAL WEEKLY
Status: DISCONTINUED | OUTPATIENT
Start: 2022-01-22 | End: 2022-01-25 | Stop reason: HOSPADM

## 2022-01-22 RX ORDER — POLYETHYLENE GLYCOL 3350 17 G/17G
17 POWDER, FOR SOLUTION ORAL DAILY PRN
Status: DISCONTINUED | OUTPATIENT
Start: 2022-01-22 | End: 2022-01-25 | Stop reason: HOSPADM

## 2022-01-22 RX ORDER — SIMETHICONE 80 MG
80 TABLET,CHEWABLE ORAL 4 TIMES DAILY PRN
Status: DISCONTINUED | OUTPATIENT
Start: 2022-01-22 | End: 2022-01-25 | Stop reason: HOSPADM

## 2022-01-22 RX ORDER — CHLORTHALIDONE 25 MG/1
25 TABLET ORAL DAILY
Status: DISCONTINUED | OUTPATIENT
Start: 2022-01-23 | End: 2022-01-24

## 2022-01-22 RX ORDER — ALLOPURINOL 100 MG/1
100 TABLET ORAL DAILY
Status: DISCONTINUED | OUTPATIENT
Start: 2022-01-23 | End: 2022-01-25 | Stop reason: HOSPADM

## 2022-01-22 RX ORDER — LABETALOL 100 MG/1
300 TABLET, FILM COATED ORAL 2 TIMES DAILY
Status: DISCONTINUED | OUTPATIENT
Start: 2022-01-22 | End: 2022-01-25 | Stop reason: HOSPADM

## 2022-01-22 RX ORDER — AMLODIPINE BESYLATE 2.5 MG/1
2.5 TABLET ORAL DAILY
Status: DISCONTINUED | OUTPATIENT
Start: 2022-01-23 | End: 2022-01-25 | Stop reason: HOSPADM

## 2022-01-22 RX ORDER — SODIUM CHLORIDE 9 MG/ML
50 INJECTION, SOLUTION INTRAVENOUS ONCE
Status: COMPLETED | OUTPATIENT
Start: 2022-01-22 | End: 2022-01-22

## 2022-01-22 RX ADMIN — SODIUM CHLORIDE 50 ML/HR: 9 INJECTION, SOLUTION INTRAVENOUS at 22:51

## 2022-01-22 RX ADMIN — LABETALOL HYDROCHLORIDE 300 MG: 100 TABLET, FILM COATED ORAL at 22:59

## 2022-01-22 RX ADMIN — CLONIDINE 0.3 MG: 0.3 PATCH TRANSDERMAL at 22:53

## 2022-01-22 NOTE — H&P
[facility]  1945, 78 y o  female MRN: [id number]  Unit/Bed#: ED 26 Encounter: [id number]  Primary Care Provider: Nadine Linn   Date and time admitted to hospital: 1/22/2022  1:42 PM    Failure to thrive in adult  Assessment & Plan  · Likely due to previous COVID infection, and possibly UTI  · Will get PT OT consult    UTI (urinary tract infection)  Assessment & Plan  · Positive UA, patient complaining malaise and fatigue  · Will start ceftriaxone  · Follow-up urine cultures    Sacral ulcer ([place] [address] )  Assessment & Plan  · From patient being hospitalized and prolonged bedrest  · Son is providing local wound care, does not look infected  · Will get wound care eval    RICARDO (acute kidney injury) ([place] [address] )  Assessment & Plan  · Creatinine 2 8  · Hold home losartan  · Encourage p o  Intake  · IV hydration  · Monitor BMP    Gastrointestinal stromal tumor (GIST) ([place] [address] )  Assessment & Plan  · Found on outpatient imaging  · Has appointment with outpatient GI for follow-up  · Family requesting GI evaluation here  · Will get GI consult    COVID-19  Assessment & Plan  · Positive for COVID-19 on 01/18  · Asymptomatic, now requiring supplemental oxygen  Hemodynamically stable  · Will not treat this time, continue to observe    VTE Pharmacologic Prophylaxis: VTE Score: 2 Moderate Risk (Score 3-4) - Pharmacological DVT Prophylaxis Ordered: heparin  Code Status: Level 1 - Full Code   Discussion with family: Updated  (son) at bedside  Anticipated Length of Stay: Patient will be admitted on an inpatient basis with an anticipated length of stay of greater than 2 midnights secondary to Failure to thrive  Total Time for Visit, including Counseling / Coordination of Care:     Greater than 50% of this total time spent on direct patient counseling and coordination of care      Chief Complaint:  Weakness    History of Present Illness:  Gregg Schmitz is a 78 y o  female with a PMH of multiple comorbidities and recent COVID infection who presents with weakness  Family patient states that since she had COVID, she has been very weak and bedbound  They state that she has difficulty going to bed and is always fatigued  Patient also complaining of pain of her sacral ulcer by her buttocks that her son has been taking care of  The son states he cleans it with saline  Son is concerned that when he goes back to work he will not be able to help his mother  Patient denies any fever, chills, cough, shortness of breath, leg swelling, nausea vomiting diarrhea  Review of Systems:  Review of Systems   Constitutional: Positive for activity change and fatigue  Negative for chills and fever  HENT: Negative for ear pain and sore throat  Eyes: Negative for pain and visual disturbance  Respiratory: Negative for cough and shortness of breath  Cardiovascular: Negative for chest pain and palpitations  Gastrointestinal: Negative for abdominal pain and vomiting  Genitourinary: Negative for dysuria and hematuria  Musculoskeletal: Positive for arthralgias, back pain, gait problem and myalgias  Skin: Negative for color change and rash  Neurological: Negative for seizures and syncope  All other systems reviewed and are negative        Past Medical and Surgical History:   Past Medical History:   Diagnosis Date    Arthritis     Chronic kidney disease     Endometriosis     High cholesterol     Hypertension     Kidney disease, chronic, stage III (moderate, EGFR 30-59 ml/min) ([facility])     Lumbar disc herniation     Neuropathy     Peripheral vascular disease ([facility])     Pneumonia     Shoulder injury     left    Spinal stenosis     Stroke ([facility] [address] ) 2015    Memory loss       Past Surgical History:   Procedure Laterality Date    CARDIAC CATHETERIZATION      COLONOSCOPY      FL RETROGRADE PYELOGRAM  4/6/2020    FRACTURE SURGERY Right     ankle    OVARIAN CYST SURGERY      CO CYSTOSCOPY,INSERT URETERAL STENT Right 4/6/2020    Procedure: INSERTION STENT URETERAL;  Surgeon: Leigh Ledezma MD;  [facility];  Service: Urology    GA CYSTOURETHROSCOPY,FULGUR 0 5-2 CM LESN N/A 4/6/2020    Procedure: TRANSURETHRAL RESECTION OF BLADDER TUMOR (TURBT); Surgeon: Leigh Ledezma MD;  [facility];  Service: Urology    GA CYSTOURETHROSCOPY,URETER CATHETER Bilateral 4/6/2020    Procedure: CYSTOSCOPY WITH RETROGRADE PYELOGRAM;  Surgeon: Leigh Ledezma MD;  [facility];  Service: Urology    ROTATOR CUFF REPAIR Left     TONSILLECTOMY         Meds/Allergies:  Prior to Admission medications    Medication Sig Start Date End Date Taking? Authorizing Provider   allopurinol (ZYLOPRIM) 100 mg tablet TAKE 1 TABLET BY MOUTH EVERY DAY 7/19/21   Wai Miller MD   amLODIPine (NORVASC) 2 5 mg tablet Take 1 tablet (2 5 mg total) by mouth daily 1/20/21 1/19/22  GRACIELA Fang   chlorthalidone 25 mg tablet Take 1 tablet (25 mg total) by mouth daily 11/18/21   GRACIELA Fang   Cholecalciferol 50 MCG (2000 UT) TABS Take 1 tablet (2,000 Units total) by mouth daily 12/8/21   Wai Miller MD   cloNIDine (CATAPRES-TTS-3) 0 3 mg/24 hr PLACE 1 PATCH (0 3 MG TOTAL) ON THE SKIN ONCE A WEEK 12/6/21   GRACIELA Fang   clopidogrel (PLAVIX) 75 mg tablet TAKE 1 TABLET BY MOUTH EVERY DAY 1/4/22   Erica Valero MD   Icosapent Ethyl (Vascepa) 1 g CAPS 2 CAP TWICE A DAY WITH FOOD 11/9/21   Erica Valero MD   labetalol (NORMODYNE) 300 mg tablet TAKE 1 TABLET BY MOUTH TWICE A DAY 7/9/21   Wai Miller MD   lidocaine (LIDODERM) 5 % APPLY 1 PATCH TOPICALLY DAILY REMOVE & DISCARD PATCH WITHIN 12 HOURS OR AS DIRECTED BY MD 9/2/21   Tuan Robison MD   losartan (COZAAR) 100 MG tablet TAKE 1 5 TABLETS BY MOUTH DAILY 8/2/21   GRACIELA Fang     I have reviewed home medications using recent Epic encounter  Allergies:    Allergies   Allergen Reactions    Fenofibrate Other (See Comments)      blood in urine  hx  Kidney Failure    Colesevelam Other (See Comments)      leg pains    Colestipol Itching and Other (See Comments)      Swelling lower legs    Ezetimibe GI Intolerance    Statins Myalgia       Social History:  Marital Status: /Civil Union   Occupation:   Patient Pre-hospital Living Situation: Home  Patient Pre-hospital Level of Mobility: unable to be assessed at time of evaluation  Patient Pre-hospital Diet Restrictions:   Substance Use History:   Social History     Substance and Sexual Activity   Alcohol Use No     Social History     Tobacco Use   Smoking Status Former Smoker    Packs/day: 2 00    Years: 40 00    Pack years: 80 00    Types: Cigarettes    Start date:     Quit date: 2020    Years since quittin 4   Smokeless Tobacco Never Used     Social History     Substance and Sexual Activity   Drug Use No       Family History:  Family History   Problem Relation Age of Onset    Hypertension Mother     Heart disease Mother         Valvular    Hyperlipidemia Mother     Hypertension Father     Heart defect Father         Cardiomegaly    Stroke Sister         Cerebrovascular Accident    Arthritis Brother     Other Brother         Back Disorder       Physical Exam:     Vitals:   Blood Pressure: 154/63 (22 181)  Pulse: 63 (22)  Temperature: 97 7 °F (36 5 °C) (22 1351)  Temp Source: Oral (22 1351)  Respirations: 16 (22)  SpO2: 95 % (22)    Physical Exam  Vitals and nursing note reviewed  Constitutional:       General: She is not in acute distress  Appearance: She is well-developed  HENT:      Head: Normocephalic and atraumatic  Eyes:      Conjunctiva/sclera: Conjunctivae normal    Cardiovascular:      Rate and Rhythm: Normal rate and regular rhythm  Heart sounds: No murmur heard  Pulmonary:      Effort: Pulmonary effort is normal  No respiratory distress        Breath sounds: Normal breath sounds  Abdominal:      Palpations: Abdomen is soft  Tenderness: There is no abdominal tenderness  Musculoskeletal:      Cervical back: Neck supple  Comments: Sacral ulcer, does not appear infected   Skin:     General: Skin is warm and dry  Neurological:      Mental Status: She is alert  Additional Data:     Lab Results:  Results from last 7 days   Lab Units 01/22/22  1358   WBC Thousand/uL 6 75   HEMOGLOBIN g/dL 9 6*   HEMATOCRIT % 27 8*   PLATELETS Thousands/uL 237   NEUTROS PCT % 73   LYMPHS PCT % 12*   MONOS PCT % 12   EOS PCT % 1     Results from last 7 days   Lab Units 01/22/22  1358   SODIUM mmol/L 135*   POTASSIUM mmol/L 3 9   CHLORIDE mmol/L 99*   CO2 mmol/L 22   BUN mg/dL 132*   CREATININE mg/dL 2 84*   ANION GAP mmol/L 14*   CALCIUM mg/dL 9 2   ALBUMIN g/dL 2 9*   TOTAL BILIRUBIN mg/dL 0 54   ALK PHOS U/L 103   ALT U/L 15   AST U/L 22   GLUCOSE RANDOM mg/dL 261*     Results from last 7 days   Lab Units 01/22/22  1358   INR  1 07             Results from last 7 days   Lab Units 01/22/22  1406 01/22/22  1358   LACTIC ACID mmol/L  --  1 6   PROCALCITONIN ng/ml 0 59*  --        Imaging: Reviewed radiology reports from this admission including: chest xray  CT abdomen pelvis wo contrast   Final Result by Naresh Mcfarlane MD (01/22 1857)      I do not see any abnormality in the area of clinical concern regarding buttocks ulceration  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  Left renal atrophy  Mild Covid pneumonia      Mild fatty infiltration of liver      Uncomplicated small fat-containing umbilical hernia      Calcified uterine leiomyoma      The study was marked in EPIC for immediate notification  Workstation performed: [id number]         XR chest portable    (Results Pending)       EKG and Other Studies Reviewed on Admission:   · EKG:        ** Please Note: This note has been constructed using a voice recognition system   **

## 2022-01-22 NOTE — ASSESSMENT & PLAN NOTE
· Positive for COVID-19 on 01/18  · Asymptomatic, now requiring supplemental oxygen    Hemodynamically stable  · Will not treat this time, continue to observe

## 2022-01-22 NOTE — ASSESSMENT & PLAN NOTE
· Positive UA, patient complaining malaise and fatigue  · Will start ceftriaxone  · Follow-up urine cultures

## 2022-01-22 NOTE — ASSESSMENT & PLAN NOTE
· From patient being hospitalized and prolonged bedrest  · Son is providing local wound care, does not look infected  · Will get wound care eval

## 2022-01-22 NOTE — ASSESSMENT & PLAN NOTE
· Found on outpatient imaging  · Has appointment with outpatient GI for follow-up  · Family requesting GI evaluation here  · Will get GI consult

## 2022-01-22 NOTE — ED NOTES
Labs drawn by SHILA Luna RN, assessment completed by same, documented under RG in error        Justyna Luna, ZULEYMA  01/22/22 6338

## 2022-01-23 LAB
ANION GAP SERPL CALCULATED.3IONS-SCNC: 12 MMOL/L (ref 4–13)
BASOPHILS # BLD AUTO: 0.01 THOUSANDS/ΜL (ref 0–0.1)
BASOPHILS NFR BLD AUTO: 0 % (ref 0–1)
BUN SERPL-MCNC: 107 MG/DL (ref 5–25)
CALCIUM SERPL-MCNC: 9 MG/DL (ref 8.3–10.1)
CHLORIDE SERPL-SCNC: 102 MMOL/L (ref 100–108)
CO2 SERPL-SCNC: 24 MMOL/L (ref 21–32)
CREAT SERPL-MCNC: 2.34 MG/DL (ref 0.6–1.3)
EOSINOPHIL # BLD AUTO: 0.1 THOUSAND/ΜL (ref 0–0.61)
EOSINOPHIL NFR BLD AUTO: 2 % (ref 0–6)
ERYTHROCYTE [DISTWIDTH] IN BLOOD BY AUTOMATED COUNT: 13 % (ref 11.6–15.1)
GFR SERPL CREATININE-BSD FRML MDRD: 19 ML/MIN/1.73SQ M
GLUCOSE SERPL-MCNC: 242 MG/DL (ref 65–140)
GLUCOSE SERPL-MCNC: 243 MG/DL (ref 65–140)
GLUCOSE SERPL-MCNC: 246 MG/DL (ref 65–140)
GLUCOSE SERPL-MCNC: 251 MG/DL (ref 65–140)
GLUCOSE SERPL-MCNC: 263 MG/DL (ref 65–140)
HCT VFR BLD AUTO: 26.8 % (ref 34.8–46.1)
HGB BLD-MCNC: 9.3 G/DL (ref 11.5–15.4)
IMM GRANULOCYTES # BLD AUTO: 0.12 THOUSAND/UL (ref 0–0.2)
IMM GRANULOCYTES NFR BLD AUTO: 2 % (ref 0–2)
LYMPHOCYTES # BLD AUTO: 0.78 THOUSANDS/ΜL (ref 0.6–4.47)
LYMPHOCYTES NFR BLD AUTO: 12 % (ref 14–44)
MAGNESIUM SERPL-MCNC: 2.2 MG/DL (ref 1.6–2.6)
MCH RBC QN AUTO: 30.2 PG (ref 26.8–34.3)
MCHC RBC AUTO-ENTMCNC: 34.7 G/DL (ref 31.4–37.4)
MCV RBC AUTO: 87 FL (ref 82–98)
MONOCYTES # BLD AUTO: 0.79 THOUSAND/ΜL (ref 0.17–1.22)
MONOCYTES NFR BLD AUTO: 12 % (ref 4–12)
NEUTROPHILS # BLD AUTO: 4.82 THOUSANDS/ΜL (ref 1.85–7.62)
NEUTS SEG NFR BLD AUTO: 72 % (ref 43–75)
NRBC BLD AUTO-RTO: 0 /100 WBCS
PHOSPHATE SERPL-MCNC: 3.2 MG/DL (ref 2.3–4.1)
PLATELET # BLD AUTO: 254 THOUSANDS/UL (ref 149–390)
PMV BLD AUTO: 10.8 FL (ref 8.9–12.7)
POTASSIUM SERPL-SCNC: 3.4 MMOL/L (ref 3.5–5.3)
PROCALCITONIN SERPL-MCNC: 0.42 NG/ML
RBC # BLD AUTO: 3.08 MILLION/UL (ref 3.81–5.12)
SODIUM SERPL-SCNC: 138 MMOL/L (ref 136–145)
WBC # BLD AUTO: 6.62 THOUSAND/UL (ref 4.31–10.16)

## 2022-01-23 PROCEDURE — 99223 1ST HOSP IP/OBS HIGH 75: CPT | Performed by: INTERNAL MEDICINE

## 2022-01-23 PROCEDURE — 84100 ASSAY OF PHOSPHORUS: CPT | Performed by: INTERNAL MEDICINE

## 2022-01-23 PROCEDURE — 80048 BASIC METABOLIC PNL TOTAL CA: CPT | Performed by: INTERNAL MEDICINE

## 2022-01-23 PROCEDURE — 99232 SBSQ HOSP IP/OBS MODERATE 35: CPT | Performed by: INTERNAL MEDICINE

## 2022-01-23 PROCEDURE — 85025 COMPLETE CBC W/AUTO DIFF WBC: CPT | Performed by: INTERNAL MEDICINE

## 2022-01-23 PROCEDURE — 84145 PROCALCITONIN (PCT): CPT | Performed by: INTERNAL MEDICINE

## 2022-01-23 PROCEDURE — 83735 ASSAY OF MAGNESIUM: CPT | Performed by: INTERNAL MEDICINE

## 2022-01-23 PROCEDURE — 82948 REAGENT STRIP/BLOOD GLUCOSE: CPT

## 2022-01-23 RX ORDER — SODIUM CHLORIDE 9 MG/ML
100 INJECTION, SOLUTION INTRAVENOUS CONTINUOUS
Status: DISCONTINUED | OUTPATIENT
Start: 2022-01-23 | End: 2022-01-25 | Stop reason: HOSPADM

## 2022-01-23 RX ADMIN — CHLORTHALIDONE 25 MG: 25 TABLET ORAL at 13:10

## 2022-01-23 RX ADMIN — CEFTRIAXONE SODIUM 1000 MG: 10 INJECTION, POWDER, FOR SOLUTION INTRAVENOUS at 23:02

## 2022-01-23 RX ADMIN — HEPARIN SODIUM 5000 UNITS: 5000 INJECTION INTRAVENOUS; SUBCUTANEOUS at 23:01

## 2022-01-23 RX ADMIN — LABETALOL HYDROCHLORIDE 300 MG: 100 TABLET, FILM COATED ORAL at 07:38

## 2022-01-23 RX ADMIN — ALLOPURINOL 100 MG: 100 TABLET ORAL at 07:38

## 2022-01-23 RX ADMIN — SODIUM CHLORIDE 75 ML/HR: 0.9 INJECTION, SOLUTION INTRAVENOUS at 18:45

## 2022-01-23 RX ADMIN — LABETALOL HYDROCHLORIDE 300 MG: 100 TABLET, FILM COATED ORAL at 17:21

## 2022-01-23 RX ADMIN — AMLODIPINE BESYLATE 2.5 MG: 2.5 TABLET ORAL at 07:38

## 2022-01-23 RX ADMIN — CLOPIDOGREL BISULFATE 75 MG: 75 TABLET ORAL at 07:38

## 2022-01-23 RX ADMIN — INSULIN LISPRO 3 UNITS: 100 INJECTION, SOLUTION INTRAVENOUS; SUBCUTANEOUS at 07:39

## 2022-01-23 RX ADMIN — INSULIN LISPRO 3 UNITS: 100 INJECTION, SOLUTION INTRAVENOUS; SUBCUTANEOUS at 17:22

## 2022-01-23 RX ADMIN — PANTOPRAZOLE SODIUM 40 MG: 40 TABLET, DELAYED RELEASE ORAL at 06:14

## 2022-01-23 RX ADMIN — INSULIN LISPRO 2 UNITS: 100 INJECTION, SOLUTION INTRAVENOUS; SUBCUTANEOUS at 23:02

## 2022-01-23 RX ADMIN — INSULIN LISPRO 3 UNITS: 100 INJECTION, SOLUTION INTRAVENOUS; SUBCUTANEOUS at 13:10

## 2022-01-23 RX ADMIN — CEFTRIAXONE SODIUM 1000 MG: 10 INJECTION, POWDER, FOR SOLUTION INTRAVENOUS at 00:07

## 2022-01-23 NOTE — PLAN OF CARE
Problem: Potential for Falls  Goal: Patient will remain free of falls  Description: INTERVENTIONS:  - Educate patient/family on patient safety including physical limitations  - Instruct patient to call for assistance with activity   - Consult OT/PT to assist with strengthening/mobility   - Keep Call bell within reach  - Keep bed low and locked with side rails adjusted as appropriate  - Keep care items and personal belongings within reach  - Initiate and maintain comfort rounds  - Make Fall Risk Sign visible to staff  - Offer Toileting every  Hours, in advance of need  - Initiate/Maintain alarm  - Obtain necessary fall risk management equipment:   - Apply yellow socks and bracelet for high fall risk patients  - Consider moving patient to room near nurses station  Outcome: Progressing     Problem: MOBILITY - ADULT  Goal: Maintain or return to baseline ADL function  Description: INTERVENTIONS:  -  Assess patient's ability to carry out ADLs; assess patient's baseline for ADL function and identify physical deficits which impact ability to perform ADLs (bathing, care of mouth/teeth, toileting, grooming, dressing, etc )  - Assess/evaluate cause of self-care deficits   - Assess range of motion  - Assess patient's mobility; develop plan if impaired  - Assess patient's need for assistive devices and provide as appropriate  - Encourage maximum independence but intervene and supervise when necessary  - Involve family in performance of ADLs  - Assess for home care needs following discharge   - Consider OT consult to assist with ADL evaluation and planning for discharge  - Provide patient education as appropriate  Outcome: Progressing  Goal: Maintains/Returns to pre admission functional level  Description: INTERVENTIONS:  - Perform BMAT or MOVE assessment daily    - Set and communicate daily mobility goal to care team and patient/family/caregiver     - Collaborate with rehabilitation services on mobility goals if consulted  - Perform Range of Motion  times a day  - Reposition patient every  hours    - Dangle patient  times a day  - Stand pat times a day  - Ambulate patient  times a day  - Out of bed to chair  times a day   - Out of bed for meals  times a day  - Out of bed for toileting  - Record patient progress and toleration of activity level   Outcome: Progressing     Problem: Prexisting or High Potential for Compromised Skin Integrity  Goal: Skin integrity is maintained or improved  Description: INTERVENTIONS:  - Identify patients at risk for skin breakdown  - Assess and monitor skin integrity  - Assess and monitor nutrition and hydration status  - Monitor labs   - Assess for incontinence   - Turn and reposition patient  - Assist with mobility/ambulation  - Relieve pressure over bony prominences  - Avoid friction and shearing  - Provide appropriate hygiene as needed including keeping skin clean and dry  - Evaluate need for skin moisturizer/barrier cream  - Collaborate with interdisciplinary team   - Patient/family teaching  - Consider wound care consult   Outcome: Progressing

## 2022-01-23 NOTE — ASSESSMENT & PLAN NOTE
· From patient being hospitalized and prolonged bedrest  · Son is providing local wound care, does not look infected  · Wound consult

## 2022-01-23 NOTE — ASSESSMENT & PLAN NOTE
· Positive UA, patient complaining malaise and fatigue; elevated procal  · Cont IV ceftriaxone  · Follow-up urine culture unfortunately was not obtained prior to abx

## 2022-01-23 NOTE — UTILIZATION REVIEW
Initial Clinical Review    Admission: Date/Time/Statement:   Admission Orders (From admission, onward)     Ordered        01/22/22 1635  Inpatient Admission  Once                      Orders Placed This Encounter   Procedures    Inpatient Admission     Standing Status:   Standing     Number of Occurrences:   1     Order Specific Question:   Level of Care     Answer:   Med Surg [16]     Order Specific Question:   Estimated length of stay     Answer:   More than 2 Midnights     Order Specific Question:   Certification     Answer:   I certify that inpatient services are medically necessary for this patient for a duration of greater than two midnights  See H&P and MD Progress Notes for additional information about the patient's course of treatment  ED Arrival Information     Expected Arrival Acuity    - 1/22/2022 13:42 Urgent         Means of arrival Escorted by Service Admission type    Ambulance [address] Urgent         Arrival complaint    SOB Chest pain        Chief Complaint   Patient presents with    Weakness - Generalized     Pt arrives to the ED, generalized/increase in weakness and diarrhea today  Initial Presentation: 79 yo female to ED from home via EMS  w/ weakness  Tested + COVID   C/o pain sacral ulcer by her buttock son has been taking care of it   Admitted IP status for UTI , RICARDO and failure to thrive  Plan to start ceftriaxone, f/u ua cx , PT OT eval   Cr 2 8 hold home losartan , enc po , IVF and monitor BMP   GI stromal tumor get GI consult     Date:  1/23  Day 2: c/o sore throat   Cont IV abx for UTI   F/u ua cx  Wearing 2 l O2 for comfort only   1/23  GI Consult   Abnormal CT suggesting GIST  Plan for EGD in 4 week   Will likely require EUS   Plan as OP        ED Triage Vitals   Temperature Pulse Respirations Blood Pressure SpO2   01/22/22 1351 01/22/22 1345 01/22/22 1345 01/22/22 1345 01/22/22 1345   97 7 °F (36 5 °C) 57 20 (!) 180/68 95 %      Temp Source Heart Rate Source Patient Position - Orthostatic VS BP Location FiO2 (%)   01/22/22 1351 01/22/22 1345 01/22/22 1345 01/22/22 1345 --   Oral Monitor Lying Right arm       Pain Score       01/22/22 1415       10 - Worst Possible Pain          Wt Readings from Last 1 Encounters:   01/22/22 63 9 kg (140 lb 14 oz)     Additional Vital Signs:   01/23/22 07:16:58 97 7 °F (36 5 °C) 64 20 184/63 Abnormal  103 94 % 28 2 L/min Nasal cannula --   01/23/22 0014 97 7 °F (36 5 °C) 60 18 163/68 108 94 % -- -- -- --   01/22/22 2230 -- -- -- -- -- 94 % -- -- None (Room air) --   01/22/22 20:13:35 97 8 °F (36 6 °C) 67 16 165/89 114 94 % -- -- -- Sitting   01/22/22 1819 -- 63 16 154/63 -- 95 % -- -- None (Room air) Sitting   01/22/22 1715 -- 62 18 161/72 104 94 % -- -- None (Room air) Sitting   01/22/22 1630 -- 63 17 178/75 Abnormal  108 96 % -- -- -- --   01/22/22 1523 -- 60 20 147/63 -- 94 % -- -- None (Room air) Sitting   01/22/22 1415 -- 60 16 176/71 Abnormal  -- 94 % -- -- None (Room air) Sitting   01/22/22 1351 97 7 °F (36 5 °C) -- -- -- -- -- --          Pertinent Labs/Diagnostic Test Results:   1/22 CT abd   I do not see any abnormality in the area of clinical concern regarding buttocks ulceration        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        Left renal atrophy        Mild Covid pneumonia       Mild fatty infiltration of liver       Uncomplicated small fat-containing umbilical hernia       Calcified uterine leiomyoma   1/23 EKG Sinus rhythm with 1st degree A-V block  Left ventricular hypertrophy with repolarization abnormality    Results from last 7 days   Lab Units 01/18/22  1357   SARS-COV-2  Positive*     Results from last 7 days   Lab Units 01/23/22  0518 01/22/22  1358   WBC Thousand/uL 6 62 6 75   HEMOGLOBIN g/dL 9 3* 9 6*   HEMATOCRIT % 26 8* 27 8*   PLATELETS Thousands/uL 254 237   NEUTROS ABS Thousands/µL 4 82 4 93     Results from last 7 days   Lab Units 01/23/22  0518 01/22/22  1358   SODIUM mmol/L 138 135*   POTASSIUM mmol/L 3 4* 3 9   CHLORIDE mmol/L 102 99*   CO2 mmol/L 24 22   ANION GAP mmol/L 12 14*   BUN mg/dL 107* 132*   CREATININE mg/dL 2 34* 2 84*   EGFR ml/min/1 73sq m 19 15   CALCIUM mg/dL 9 0 9 2   MAGNESIUM mg/dL 2 2 2 2   PHOSPHORUS mg/dL 3 2  --      Results from last 7 days   Lab Units 01/22/22  1358   AST U/L 22   ALT U/L 15   ALK PHOS U/L 103   TOTAL PROTEIN g/dL 7 1   ALBUMIN g/dL 2 9*   TOTAL BILIRUBIN mg/dL 0 54     Results from last 7 days   Lab Units 01/23/22  1126 01/23/22  0607 01/22/22  2154   POC GLUCOSE mg/dl 251* 242* 360*     Results from last 7 days   Lab Units 01/23/22  0518 01/22/22  1358   GLUCOSE RANDOM mg/dL 243* 261*     Results from last 7 days   Lab Units 01/22/22  1406   PH PAM  7 402*   PCO2 PAM mm Hg 34 6*   PO2 PAM mm Hg 56 9*   HCO3 PAM mmol/L 21 0*   BASE EXC PAM mmol/L -3 2   O2 CONTENT PAM ml/dL 13 1   O2 HGB, VENOUS % 88 5*     Results from last 7 days   Lab Units 01/22/22  1819 01/22/22  1633 01/22/22  1358   HS TNI 0HR ng/L  --   --  13   HS TNI 2HR ng/L  --  12  --    HSTNI D2 ng/L  --  -1  --    HS TNI 4HR ng/L 12  --   --    HSTNI D4 ng/L -1  --   --      Results from last 7 days   Lab Units 01/22/22  1358   PROTIME seconds 13 5   INR  1 07   PTT seconds 29     Results from last 7 days   Lab Units 01/23/22  0518 01/22/22  1406   PROCALCITONIN ng/ml 0 42* 0 59*     Results from last 7 days   Lab Units 01/22/22  1358   LACTIC ACID mmol/L 1 6     Results from last 7 days   Lab Units 01/22/22  1358   NT-PRO BNP pg/mL 1,302*     Results from last 7 days   Lab Units 01/22/22  1358   LIPASE u/L 118       Results from last 7 days   Lab Units 01/22/22  1413 01/22/22  1358   BLOOD CULTURE  Received in Microbiology Lab  Culture in Progress  Received in Microbiology Lab  Culture in Progress       ED Treatment:   Medication Administration from 01/22/2022 1342 to 01/22/2022 2005     None        Past Medical History:   Diagnosis Date    Arthritis     Chronic kidney disease     Endometriosis     High cholesterol     Hypertension     Kidney disease, chronic, stage III (moderate, EGFR 30-59 ml/min) (HCC)     Lumbar disc herniation     Neuropathy     Peripheral vascular disease (HCC)     Pneumonia     Shoulder injury     left    Spinal stenosis     Stroke ([facility] [address] ) 2015    Memory loss     Present on Admission:  **None**      Admitting Diagnosis: SOB (shortness of breath) [R06 02]  Decubitus ulcer of buttock [L89 309]  Failure to thrive in adult [R62 7]  Gastric mass [K31 89]  Age/Sex: 78 y o  female  Admission Orders:  Scheduled Medications:  allopurinol, 100 mg, Oral, Daily  amLODIPine, 2 5 mg, Oral, Daily  cefTRIAXone, 1,000 mg, Intravenous, Q24H  chlorthalidone, 25 mg, Oral, Daily  cloNIDine, 1 patch, Transdermal, Weekly  clopidogrel, 75 mg, Oral, Daily  heparin (porcine), 5,000 Units, Subcutaneous, Q8H JAVIER  insulin lispro, 1-5 Units, Subcutaneous, HS  insulin lispro, 1-6 Units, Subcutaneous, TID AC  labetalol, 300 mg, Oral, BID  pantoprazole, 40 mg, Oral, Early Morning      Continuous IV Infusions:     PRN Meds:  aluminum-magnesium hydroxide-simethicone, 30 mL, Oral, Q6H PRN  ondansetron, 4 mg, Intravenous, Q6H PRN  polyethylene glycol, 17 g, Oral, Daily PRN  simethicone, 80 mg, Oral, 4x Daily PRN    Fingerstick ac and hs   PT OT eval   Reg diet     IP CONSULT TO GASTROENTEROLOGY  IP CONSULT TO NUTRITION SERVICES    Network Utilization Review Department  ATTENTION: Please call with any questions or concerns to [phone] and carefully listen to the prompts so that you are directed to the right person  All voicemails are confidential   [facility] all requests for admission clinical reviews, approved or denied determinations and any other requests to dedicated fax number below belonging to the campus where the patient is receiving treatment   List of dedicated fax numbers for the Facilities:  FACILITY NAME UR FAX NUMBER   ADMISSION DENIALS (Administrative/Medical Necessity) [phone]   [address] (Maternity/NICU/Pediatrics) 261 [facility] [facility] 40 125 [facility]  [phone]   Stacy Martin [address] [place] [address] [id number] [place] [address] [address] [facility] [address] [facility] [phone]

## 2022-01-23 NOTE — ASSESSMENT & PLAN NOTE
· Positive for COVID-19 on 01/18  · CXR with b/l infiltrates  · POX stable, O2 independent, only placed for comfort and per pt's preference  · Will not treat this time, continue to observe

## 2022-01-23 NOTE — PLAN OF CARE
Problem: Potential for Falls  Goal: Patient will remain free of falls  Description: INTERVENTIONS:  - Educate patient/family on patient safety including physical limitations  - Instruct patient to call for assistance with activity   - Consult OT/PT to assist with strengthening/mobility   - Keep Call bell within reach  - Keep bed low and locked with side rails adjusted as appropriate  - Keep care items and personal belongings within reach  - Initiate and maintain comfort rounds  - Make Fall Risk Sign visible to staff  - Apply yellow socks and bracelet for high fall risk patients  - Consider moving patient to room near nurses station  Outcome: Progressing     Problem: MOBILITY - ADULT  Goal: Maintain or return to baseline ADL function  Description: INTERVENTIONS:  -  Assess patient's ability to carry out ADLs; assess patient's baseline for ADL function and identify physical deficits which impact ability to perform ADLs (bathing, care of mouth/teeth, toileting, grooming, dressing, etc )  - Assess/evaluate cause of self-care deficits   - Assess range of motion  - Assess patient's mobility; develop plan if impaired  - Assess patient's need for assistive devices and provide as appropriate  - Encourage maximum independence but intervene and supervise when necessary  - Involve family in performance of ADLs  - Assess for home care needs following discharge   - Consider OT consult to assist with ADL evaluation and planning for discharge  - Provide patient education as appropriate  Outcome: Progressing

## 2022-01-23 NOTE — ASSESSMENT & PLAN NOTE
· Found on outpatient imaging was planned on outpt eval however pt's family insistent on GI consult while admitted  · S/p GI consult, plans on egd/eus upon resolve of covid

## 2022-01-23 NOTE — PROGRESS NOTES
[address] [facility]  Progress Note - Han Salinas 1945, 78 y o  female MRN: [id number]  Unit/Bed#: -01 Encounter: [id number]  Primary Care Provider: GRACIELA Melo   Date and time admitted to hospital: 1/22/2022  1:42 PM    * COVID-19  Assessment & Plan  · Positive for COVID-19 on 01/18  · CXR with b/l infiltrates  · POX stable, O2 independent, only placed for comfort and per pt's preference  · Will not treat this time, continue to observe    Failure to thrive in adult  Assessment & Plan  · Likely due to previous COVID infection, and possibly UTI  · PT/OT    RICARDO (acute kidney injury) ([facility] )  Assessment & Plan  · On CKD stg III   · Hold home losartan  · Encourage p o  Intake  · Cont IVF hydration  · Cont to monitor    UTI (urinary tract infection)  Assessment & Plan  · Positive UA, patient complaining malaise and fatigue; elevated procal  · Cont IV ceftriaxone  · Follow-up urine culture unfortunately was not obtained prior to abx    Gastrointestinal stromal tumor (GIST) ([facility] )  Assessment & Plan  · Found on outpatient imaging was planned on outpt eval however pt's family insistent on GI consult while admitted  · S/p GI consult, plans on egd/eus upon resolve of covid    Sacral ulcer ([facility] )  Assessment & Plan  · From patient being hospitalized and prolonged bedrest  · Son is providing local wound care, does not look infected  · Wound consult      VTE Pharmacologic Prophylaxis:   Pharmacologic: Heparin  Mechanical VTE Prophylaxis in Place: No    Patient Centered Rounds: I have performed bedside rounds with nursing staff today  Discussions with Specialists or Other Care Team Provider:     Education and Discussions with Family / Patient: Patient;called pt's , no answer left msg    Time Spent for Care: 30 minutes  More than 50% of total time spent on counseling and coordination of care as described above      Current Length of Stay: 1 day(s)    Current Patient Status: Inpatient Certification Statement: The patient will continue to require additional inpatient hospital stay due to acute illness    Discharge Plan:     Code Status: Level 1 - Full Code      Subjective:   No acute events  Only c/o sorethroat  Denies dyspnea  Only has o2 for comfort    Objective:     Vitals:   Temp (24hrs), Av 7 °F (36 5 °C), Min:97 7 °F (36 5 °C), Max:97 8 °F (36 6 °C)    Temp:  [97 7 °F (36 5 °C)-97 8 °F (36 6 °C)] 97 7 °F (36 5 °C)  HR:  [60-67] 65  Resp:  [16-20] 17  BP: (148-184)/(63-97) 148/97  SpO2:  [94 %-97 %] 97 %  Body mass index is 28 45 kg/m²  Input and Output Summary (last 24 hours): Intake/Output Summary (Last 24 hours) at 2022 1756  Last data filed at 2022 1727  Gross per 24 hour   Intake 1030 ml   Output 1450 ml   Net -420 ml       Physical Exam:     Physical Exam  Cardiovascular:      Rate and Rhythm: Normal rate and regular rhythm  Pulses: Normal pulses  Heart sounds: Normal heart sounds  No murmur heard  Pulmonary:      Effort: Pulmonary effort is normal  No respiratory distress  Breath sounds: Normal breath sounds  No wheezing or rales  Abdominal:      General: Abdomen is flat  Bowel sounds are normal  There is no distension  Palpations: Abdomen is soft  Tenderness: There is no abdominal tenderness  There is no guarding  Musculoskeletal:         General: Normal range of motion  Cervical back: Normal range of motion and neck supple  Right lower leg: No edema  Left lower leg: No edema  Skin:     General: Skin is warm and dry  Neurological:      General: No focal deficit present  Mental Status: She is alert  Mental status is at baseline  Cranial Nerves: No cranial nerve deficit  Motor: No weakness             Additional Data:     Labs:    Results from last 7 days   Lab Units 22  0518   WBC Thousand/uL 6 62   HEMOGLOBIN g/dL 9 3*   HEMATOCRIT % 26 8*   PLATELETS Thousands/uL 254   NEUTROS PCT % 72 LYMPHS PCT % 12*   MONOS PCT % 12   EOS PCT % 2     Results from last 7 days   Lab Units 01/23/22  0518 01/22/22  1358 01/22/22  1358   SODIUM mmol/L 138   < > 135*   POTASSIUM mmol/L 3 4*   < > 3 9   CHLORIDE mmol/L 102   < > 99*   CO2 mmol/L 24   < > 22   BUN mg/dL 107*   < > 132*   CREATININE mg/dL 2 34*   < > 2 84*   ANION GAP mmol/L 12   < > 14*   CALCIUM mg/dL 9 0   < > 9 2   ALBUMIN g/dL  --   --  2 9*   TOTAL BILIRUBIN mg/dL  --   --  0 54   ALK PHOS U/L  --   --  103   ALT U/L  --   --  15   AST U/L  --   --  22   GLUCOSE RANDOM mg/dL 243*   < > 261*    < > = values in this interval not displayed  Results from last 7 days   Lab Units 01/22/22  1358   INR  1 07     Results from last 7 days   Lab Units 01/23/22  1530 01/23/22  1126 01/23/22  0607 01/22/22  2154   POC GLUCOSE mg/dl 263* 251* 242* 360*         Results from last 7 days   Lab Units 01/23/22  0518 01/22/22  1406 01/22/22  1358   LACTIC ACID mmol/L  --   --  1 6   PROCALCITONIN ng/ml 0 42* 0 59*  --            * I Have Reviewed All Lab Data Listed Above  * Additional Pertinent Lab Tests Reviewed: All Labs Within Last 24 Hours Reviewed    Imaging:    Imaging Reports Reviewed Today Include:   Imaging Personally Reviewed by Myself Includes:      Recent Cultures (last 7 days):     Results from last 7 days   Lab Units 01/22/22  1413 01/22/22  1358   BLOOD CULTURE  Received in Microbiology Lab  Culture in Progress  Received in Microbiology Lab  Culture in Progress         Last 24 Hours Medication List:   Current Facility-Administered Medications   Medication Dose Route Frequency Provider Last Rate    allopurinol  100 mg Oral Daily Adele Odonnell MD      aluminum-magnesium hydroxide-simethicone  30 mL Oral Q6H PRN Adele Odonnell MD      amLODIPine  2 5 mg Oral Daily Adele Odonnell MD      cefTRIAXone  1,000 mg Intravenous V32S Adele Odonnell MD 1,504 mg (01/23/22 0007)    chlorthalidone  25 mg Oral Daily Felix Pendleton MD      cloNIDine  1 patch Transdermal Weekly Felix Pendleton MD      clopidogrel  75 mg Oral Daily Felix Pendleton MD      heparin (porcine)  5,000 Units Subcutaneous [facility] Felix Pendleton MD      insulin lispro  1-5 Units Subcutaneous HS Felix Pendleton MD      insulin lispro  1-6 Units Subcutaneous TID [facility] Felix Pendleton MD      labetalol  300 mg Oral BID Felix Pendleton MD      ondansetron  4 mg Intravenous Q6H PRN Felix Pendleton MD      pantoprazole  40 mg Oral Early Morning Felix Pendleton MD      polyethylene glycol  17 g Oral Daily PRN Feilx Pendleton MD      simethicone  80 mg Oral 4x Daily PRN Felix Pendleton MD      sodium chloride  75 mL/hr Intravenous Continuous Kaylee Contreras DO          Today, Patient Was Seen By: Kaylee Contreras DO    ** Please Note: Dictation voice to text software may have been used in the creation of this document   **

## 2022-01-23 NOTE — ASSESSMENT & PLAN NOTE
· On CKD stg III   · Hold home losartan  · Encourage p o   Intake  · Cont IVF hydration  · Cont to monitor

## 2022-01-23 NOTE — CONSULTS
Consultation - [facility]  Rj Steele 78 y o  female MRN: [id number]  Unit/Bed#: -01 Encounter: [id number]        Consults    Reason for Consult / Principal Problem: Abnormal CT suggesting GIST of stomach    HPI:  61-year-old female diagnosed with COVID 23 on January 18th he was brought to [facility] by her family secondary to severe fatigue and weakness  She was admitted with adult failure to thrive  She is noted to be in acute renal failure  She also has a known sacral ulcer  In August of this past year she had a CT of the chest which commented on a 2 9 x 2 4 x 3 1 cm nodular soft tissue density in the lesser curvature of the stomach  There is a comment made that it was unchanged from previous chest CT  Although additional comment is made that it was not clearly evident on abdominal CT in February 2011  The possibility of gastric ulcer or mass such as a gist tumor was raised  She had an outpatient GI consult scheduled for January 27th for further evaluation of this  Given her admission the family has asked that we evaluate her while here  She had a CT of the abdomen and pelvis without contrast on January 22nd of this year  This showed a subserosal mostly exophytic mass arising from the lesser curvature of the stomach measuring 2 5 x 3 4 cm  The patient denies any abdominal pain  She denies nausea or vomiting  She has not been eating well over the past few days presumably due to her COVID infection  She denies any recent unexpected weight loss  She has had no hematemesis or melena  She does not believe she has ever had an upper endoscopy in the past         REVIEW OF SYSTEMS:    CONSTITUTIONAL: Denies any fever, chills, or rigors  Good appetite, and no recent weight loss  HEENT: No earache or tinnitus  Denies hearing loss or visual disturbances  CARDIOVASCULAR: No chest pain or palpitations     RESPIRATORY: Denies any cough, hemoptysis, shortness of breath or dyspnea on exertion  GASTROINTESTINAL: As noted in the History of Present Illness  GENITOURINARY: No problems with urination  Denies any hematuria or dysuria  NEUROLOGIC: No dizziness or vertigo, denies headaches  MUSCULOSKELETAL: Denies any muscle or joint pain  SKIN: Denies skin rashes or itching  ENDOCRINE: Denies excessive thirst  Denies intolerance to heat or cold  PSYCHOSOCIAL: Denies depression or anxiety  Denies any recent memory loss  Historical Information   Past Medical History:   Diagnosis Date    Arthritis     Chronic kidney disease     Endometriosis     High cholesterol     Hypertension     Kidney disease, chronic, stage III (moderate, EGFR 30-59 ml/min) ([facility])     Lumbar disc herniation     Neuropathy     Peripheral vascular disease ([facility])     Pneumonia     Shoulder injury     left    Spinal stenosis     Stroke ([facility] [address] ) 2015    Memory loss     Past Surgical History:   Procedure Laterality Date    CARDIAC CATHETERIZATION      COLONOSCOPY      FL RETROGRADE PYELOGRAM  4/6/2020    FRACTURE SURGERY Right     ankle    OVARIAN CYST SURGERY      DE CYSTOSCOPY,INSERT URETERAL STENT Right 4/6/2020    Procedure: INSERTION STENT URETERAL;  Surgeon: Ninoska Ni MD;  [facility];  Service: Urology    DE CYSTOURETHROSCOPY,FULGUR 0 5-2 CM LESN N/A 4/6/2020    Procedure: TRANSURETHRAL RESECTION OF BLADDER TUMOR (TURBT);   Surgeon: Ninoska Ni MD;  [facility];  Service: Urology    DE CYSTOURETHROSCOPY,URETER CATHETER Bilateral 4/6/2020    Procedure: Dorota Williamson;  Surgeon: Ninoska Ni MD;  [facility];  Service: Urology    ROTATOR CUFF REPAIR Left     TONSILLECTOMY       Social History   Social History     Substance and Sexual Activity   Alcohol Use Never     Social History     Substance and Sexual Activity   Drug Use Never     Social History     Tobacco Use   Smoking Status Former Smoker    Packs/day: 2 00    Years: 40 00    Pack years: 80 00    Types: Cigarettes    Start date: 1    Quit date: 2020    Years since quittin 4   Smokeless Tobacco Never Used     Family History   Problem Relation Age of Onset    Hypertension Mother     Heart disease Mother         Valvular    Hyperlipidemia Mother     Hypertension Father     Heart defect Father         Cardiomegaly    Stroke Sister         Cerebrovascular Accident    Arthritis Brother     Other Brother         Back Disorder       Meds/Allergies     Medications Prior to Admission   Medication    allopurinol (ZYLOPRIM) 100 mg tablet    chlorthalidone 25 mg tablet    Cholecalciferol 50 MCG ( UT) TABS    cloNIDine (CATAPRES-TTS-3) 0 3 mg/24 hr    clopidogrel (PLAVIX) 75 mg tablet    Icosapent Ethyl (Vascepa) 1 g CAPS    labetalol (NORMODYNE) 300 mg tablet    losartan (COZAAR) 100 MG tablet    amLODIPine (NORVASC) 2 5 mg tablet    lidocaine (LIDODERM) 5 %     Current Facility-Administered Medications   Medication Dose Route Frequency    allopurinol (ZYLOPRIM) tablet 100 mg  100 mg Oral Daily    aluminum-magnesium hydroxide-simethicone (MYLANTA) oral suspension 30 mL  30 mL Oral Q6H PRN    amLODIPine (NORVASC) tablet 2 5 mg  2 5 mg Oral Daily    ceftriaxone (ROCEPHIN) 1 g/50 mL in dextrose IVPB  1,000 mg Intravenous Q24H    chlorthalidone tablet 25 mg  25 mg Oral Daily    cloNIDine (CATAPRES-TTS-3) 0 3 mg/24 hr TD weekly patch  1 patch Transdermal Weekly    clopidogrel (PLAVIX) tablet 75 mg  75 mg Oral Daily    heparin (porcine) subcutaneous injection 5,000 Units  5,000 Units Subcutaneous Q8H [facility]    insulin lispro (HumaLOG) 100 units/mL subcutaneous injection 1-5 Units  1-5 Units Subcutaneous HS    insulin lispro (HumaLOG) 100 units/mL subcutaneous injection 1-6 Units  1-6 Units Subcutaneous TID AC    labetalol (NORMODYNE) tablet 300 mg  300 mg Oral BID    ondansetron (ZOFRAN) injection 4 mg  4 mg Intravenous Q6H PRN    pantoprazole (PROTONIX) EC tablet 40 mg  40 mg Oral Early Morning    polyethylene glycol (MIRALAX) packet 17 g  17 g Oral Daily PRN    simethicone (MYLICON) chewable tablet 80 mg  80 mg Oral 4x Daily PRN       Allergies   Allergen Reactions    Fenofibrate Other (See Comments)      blood in urine  hx  Kidney Failure    Colesevelam Other (See Comments)      leg pains    Colestipol Itching and Other (See Comments)      Swelling lower legs    Ezetimibe GI Intolerance    Statins Myalgia           Objective     Blood pressure (!) 184/63, pulse 64, temperature 97 7 °F (36 5 °C), resp  rate 20, height 4' 11" (1 499 m), weight 63 9 kg (140 lb 14 oz), SpO2 94 %  Intake/Output Summary (Last 24 hours) at 1/23/2022 8102  Last data filed at 1/23/2022 0849  Gross per 24 hour   Intake 250 ml   Output 650 ml   Net -400 ml         PHYSICAL EXAM:      General Appearance:   Alert, cooperative, no distress, appears stated age    HEENT:   Normocephalic, atraumatic, anicteric      Neck:  Supple, symmetrical, trachea midline, no adenopathy;    thyroid: no enlargement/tenderness/nodules; no carotid  bruit or JVD    Lungs:   Clear to auscultation bilaterally; no rales, rhonchi or wheezing; respirations unlabored    Heart[de-identified]   S1 and S2 normal; regular rate and rhythm; no murmur, rub, or gallop     Abdomen:   Soft, non-tender, non-distended; normal bowel sounds; no masses, no organomegaly    Genitalia:   Deferred    Rectal:   Deferred    Extremities:  No cyanosis, clubbing or edema    Pulses:  2+ and symmetric all extremities    Skin:  Skin color, texture, turgor normal, no rashes or lesions    Lymph nodes:  No palpable cervical, axillary or inguinal lymphadenopathy        Lab Results:   Results from last 7 days   Lab Units 01/23/22  0518   WBC Thousand/uL 6 62   HEMOGLOBIN g/dL 9 3*   HEMATOCRIT % 26 8*   PLATELETS Thousands/uL 254   NEUTROS PCT % 72   LYMPHS PCT % 12*   MONOS PCT % 12   EOS PCT % 2     Results from last 7 days   Lab Units 01/23/22  0518 01/22/22  1358 01/22/22  1358   POTASSIUM mmol/L 3 4*   < > 3 9   CHLORIDE mmol/L 102   < > 99*   CO2 mmol/L 24   < > 22   BUN mg/dL 107*   < > 132*   CREATININE mg/dL 2 34*   < > 2 84*   CALCIUM mg/dL 9 0   < > 9 2   ALK PHOS U/L  --   --  103   ALT U/L  --   --  15   AST U/L  --   --  22    < > = values in this interval not displayed  Results from last 7 days   Lab Units 01/22/22  1358   INR  1 07     Results from last 7 days   Lab Units 01/22/22  1358   LIPASE u/L 118       Imaging Studies: I have personally reviewed pertinent imaging studies  CT abdomen pelvis wo contrast    Result Date: 1/22/2022  Impression: I do not see any abnormality in the area of clinical concern regarding buttocks ulceration  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  Left renal atrophy  Mild Covid pneumonia Mild fatty infiltration of liver Uncomplicated small fat-containing umbilical hernia Calcified uterine leiomyoma The study was marked in EPIC for immediate notification  Workstation performed: [id number]       ASSESSMENT and PLAN:      Abnormal CT suggesting GIST    - First noted incidentally on CT Chest 8/2021 with similar appearance on CT abdomen 1/22/22   - Patient is currently asymptomatic with an active COVID-19 infection   - Will plan routine EGD in approximately 4 weeks for further evaluation      - She will likely require EUS   - Will sign off and f/u patient as planned in the outpatient setting     - I left a message for patient's  to discuss the plan      Patient was seen and examined by Dr Nola Boothe  All jennings medical decisions were made by Dr Nola Boothe  Thank you for allowing us to participate in the care of this present patient  We will follow-up with you closely

## 2022-01-24 ENCOUNTER — APPOINTMENT (INPATIENT)
Dept: VASCULAR ULTRASOUND | Facility: HOSPITAL | Age: 77
DRG: 682 | End: 2022-01-24
Payer: COMMERCIAL

## 2022-01-24 PROBLEM — L89.302 PRESSURE INJURY OF BUTTOCK, STAGE 2 (HCC): Status: ACTIVE | Noted: 2022-01-24

## 2022-01-24 LAB
ANION GAP SERPL CALCULATED.3IONS-SCNC: 12 MMOL/L (ref 4–13)
BUN SERPL-MCNC: 87 MG/DL (ref 5–25)
CALCIUM SERPL-MCNC: 9.4 MG/DL (ref 8.3–10.1)
CHLORIDE SERPL-SCNC: 103 MMOL/L (ref 100–108)
CO2 SERPL-SCNC: 25 MMOL/L (ref 21–32)
CREAT SERPL-MCNC: 2.15 MG/DL (ref 0.6–1.3)
D DIMER PPP FEU-MCNC: 3.23 UG/ML FEU
ERYTHROCYTE [DISTWIDTH] IN BLOOD BY AUTOMATED COUNT: 13.1 % (ref 11.6–15.1)
GFR SERPL CREATININE-BSD FRML MDRD: 22 ML/MIN/1.73SQ M
GLUCOSE SERPL-MCNC: 196 MG/DL (ref 65–140)
GLUCOSE SERPL-MCNC: 221 MG/DL (ref 65–140)
GLUCOSE SERPL-MCNC: 259 MG/DL (ref 65–140)
GLUCOSE SERPL-MCNC: 328 MG/DL (ref 65–140)
HCT VFR BLD AUTO: 27.5 % (ref 34.8–46.1)
HGB BLD-MCNC: 9.4 G/DL (ref 11.5–15.4)
MCH RBC QN AUTO: 30 PG (ref 26.8–34.3)
MCHC RBC AUTO-ENTMCNC: 34.2 G/DL (ref 31.4–37.4)
MCV RBC AUTO: 88 FL (ref 82–98)
PLATELET # BLD AUTO: 310 THOUSANDS/UL (ref 149–390)
PMV BLD AUTO: 10.1 FL (ref 8.9–12.7)
POTASSIUM SERPL-SCNC: 3.6 MMOL/L (ref 3.5–5.3)
RBC # BLD AUTO: 3.13 MILLION/UL (ref 3.81–5.12)
SODIUM SERPL-SCNC: 140 MMOL/L (ref 136–145)
WBC # BLD AUTO: 7.41 THOUSAND/UL (ref 4.31–10.16)

## 2022-01-24 PROCEDURE — 97166 OT EVAL MOD COMPLEX 45 MIN: CPT

## 2022-01-24 PROCEDURE — 85027 COMPLETE CBC AUTOMATED: CPT | Performed by: INTERNAL MEDICINE

## 2022-01-24 PROCEDURE — 85379 FIBRIN DEGRADATION QUANT: CPT | Performed by: INTERNAL MEDICINE

## 2022-01-24 PROCEDURE — 82948 REAGENT STRIP/BLOOD GLUCOSE: CPT

## 2022-01-24 PROCEDURE — 97163 PT EVAL HIGH COMPLEX 45 MIN: CPT

## 2022-01-24 PROCEDURE — 97110 THERAPEUTIC EXERCISES: CPT

## 2022-01-24 PROCEDURE — 80048 BASIC METABOLIC PNL TOTAL CA: CPT | Performed by: INTERNAL MEDICINE

## 2022-01-24 PROCEDURE — 93970 EXTREMITY STUDY: CPT

## 2022-01-24 PROCEDURE — 99232 SBSQ HOSP IP/OBS MODERATE 35: CPT | Performed by: INTERNAL MEDICINE

## 2022-01-24 RX ADMIN — AMLODIPINE BESYLATE 2.5 MG: 2.5 TABLET ORAL at 09:31

## 2022-01-24 RX ADMIN — CHLORTHALIDONE 25 MG: 25 TABLET ORAL at 09:31

## 2022-01-24 RX ADMIN — LABETALOL HYDROCHLORIDE 300 MG: 100 TABLET, FILM COATED ORAL at 17:16

## 2022-01-24 RX ADMIN — SODIUM CHLORIDE 75 ML/HR: 0.9 INJECTION, SOLUTION INTRAVENOUS at 09:29

## 2022-01-24 RX ADMIN — LABETALOL HYDROCHLORIDE 300 MG: 100 TABLET, FILM COATED ORAL at 09:30

## 2022-01-24 RX ADMIN — INSULIN LISPRO 5 UNITS: 100 INJECTION, SOLUTION INTRAVENOUS; SUBCUTANEOUS at 07:42

## 2022-01-24 RX ADMIN — CEFTRIAXONE SODIUM 1000 MG: 10 INJECTION, POWDER, FOR SOLUTION INTRAVENOUS at 23:33

## 2022-01-24 RX ADMIN — INSULIN LISPRO 2 UNITS: 100 INJECTION, SOLUTION INTRAVENOUS; SUBCUTANEOUS at 17:15

## 2022-01-24 RX ADMIN — INSULIN LISPRO 2 UNITS: 100 INJECTION, SOLUTION INTRAVENOUS; SUBCUTANEOUS at 11:59

## 2022-01-24 RX ADMIN — HEPARIN SODIUM 5000 UNITS: 5000 INJECTION INTRAVENOUS; SUBCUTANEOUS at 23:19

## 2022-01-24 RX ADMIN — INSULIN LISPRO 1 UNITS: 100 INJECTION, SOLUTION INTRAVENOUS; SUBCUTANEOUS at 23:19

## 2022-01-24 RX ADMIN — ALLOPURINOL 100 MG: 100 TABLET ORAL at 09:29

## 2022-01-24 RX ADMIN — HEPARIN SODIUM 5000 UNITS: 5000 INJECTION INTRAVENOUS; SUBCUTANEOUS at 14:39

## 2022-01-24 RX ADMIN — PANTOPRAZOLE SODIUM 40 MG: 40 TABLET, DELAYED RELEASE ORAL at 05:10

## 2022-01-24 RX ADMIN — HEPARIN SODIUM 5000 UNITS: 5000 INJECTION INTRAVENOUS; SUBCUTANEOUS at 05:10

## 2022-01-24 RX ADMIN — CLOPIDOGREL BISULFATE 75 MG: 75 TABLET ORAL at 09:29

## 2022-01-24 NOTE — ED PROVIDER NOTES
History  Chief Complaint   Patient presents with    Weakness - Generalized     Pt arrives to the ED, generalized/increase in weakness and diarrhea today  78year old female patient presents emergency department for evaluation of generalized malaise, fatigue, buttock pain which is progressively worsening  On physical exam the patient does have a decubitus ulcer on the buttocks, she states that she is normally up and around been able to care for herself but currently she is not able to do so plan of the family is very caring and helpful they were unable to get her up and out of bed because she is too weak  The patient is known to be COVID positive, she has no immediate respiratory complaints just generalized malaise fatigue and decubitus ulcer  History provided by:  Patient   used: No    Medical Problem  Severity:  Mild  Onset quality:  Gradual  Timing:  Constant  Progression:  Worsening  Chronicity:  New  Associated symptoms: no congestion, no diarrhea, no ear pain and no fever        Prior to Admission Medications   Prescriptions Last Dose Informant Patient Reported? Taking?    Cholecalciferol 50 MCG (2000 UT) TABS Past Week at Unknown time Self No Yes   Sig: Take 1 tablet (2,000 Units total) by mouth daily   Icosapent Ethyl (Vascepa) 1 g CAPS Past Week at Unknown time Self No Yes   Si CAP TWICE A DAY WITH FOOD   allopurinol (ZYLOPRIM) 100 mg tablet Past Week at Unknown time Self No Yes   Sig: TAKE 1 TABLET BY MOUTH EVERY DAY   amLODIPine (NORVASC) 2 5 mg tablet  Self No No   Sig: Take 1 tablet (2 5 mg total) by mouth daily   chlorthalidone 25 mg tablet Past Week at Unknown time Self No Yes   Sig: Take 1 tablet (25 mg total) by mouth daily   cloNIDine (CATAPRES-TTS-3) 0 3 mg/24 hr 2022 at Unknown time Self No Yes   Sig: PLACE 1 PATCH (0 3 MG TOTAL) ON THE SKIN ONCE A WEEK   clopidogrel (PLAVIX) 75 mg tablet Past Week at Unknown time Self No Yes   Sig: TAKE 1 TABLET BY MOUTH EVERY DAY   labetalol (NORMODYNE) 300 mg tablet 1/22/2022 at Unknown time Self No Yes   Sig: TAKE 1 TABLET BY MOUTH TWICE A DAY   lidocaine (LIDODERM) 5 % Unknown at Unknown time Self No No   Sig: APPLY 1 PATCH TOPICALLY DAILY REMOVE & DISCARD PATCH WITHIN 12 HOURS OR AS DIRECTED BY MD   losartan (COZAAR) 100 MG tablet Past Week at Unknown time Self No Yes   Sig: TAKE 1 5 TABLETS BY MOUTH DAILY      Facility-Administered Medications: None       Past Medical History:   Diagnosis Date    Arthritis     Chronic kidney disease     Endometriosis     High cholesterol     Hypertension     Kidney disease, chronic, stage III (moderate, EGFR 30-59 ml/min) ([facility])     Lumbar disc herniation     Neuropathy     Peripheral vascular disease ([facility])     Pneumonia     Shoulder injury     left    Spinal stenosis     Stroke ([facility] ) 2015    Memory loss       Past Surgical History:   Procedure Laterality Date    CARDIAC CATHETERIZATION      COLONOSCOPY      FL RETROGRADE PYELOGRAM  4/6/2020    FRACTURE SURGERY Right     ankle    OVARIAN CYST SURGERY      NH CYSTOSCOPY,INSERT URETERAL STENT Right 4/6/2020    Procedure: INSERTION STENT URETERAL;  Surgeon: Vernon Goltz, MD;  [facility];  Service: Urology    NH CYSTOURETHROSCOPY,FULGUR 0 5-2 CM LESN N/A 4/6/2020    Procedure: TRANSURETHRAL RESECTION OF BLADDER TUMOR (TURBT);   Surgeon: Vernon Goltz, MD;  [facility];  Service: Urology    NH CYSTOURETHROSCOPY,URETER CATHETER Bilateral 4/6/2020    Procedure: CYSTOSCOPY WITH RETROGRADE PYELOGRAM;  Surgeon: Vernon Goltz, MD;  [facility];  Service: Urology    ROTATOR CUFF REPAIR Left     TONSILLECTOMY         Family History   Problem Relation Age of Onset    Hypertension Mother     Heart disease Mother         Valvular    Hyperlipidemia Mother     Hypertension Father     Heart defect Father         Cardiomegaly    Stroke Sister         Cerebrovascular Accident    Arthritis Brother     Other Brother         Back Disorder     I have reviewed and agree with the history as documented  E-Cigarette/Vaping    E-Cigarette Use Never User      E-Cigarette/Vaping Substances    Nicotine No     THC No     CBD No     Flavoring No     Other No     Unknown No      Social History     Tobacco Use    Smoking status: Former Smoker     Packs/day: 2 00     Years: 40 00     Pack years: 80 00     Types: Cigarettes     Start date:      Quit date: 2020     Years since quittin 4    Smokeless tobacco: Never Used   Vaping Use    Vaping Use: Never used   Substance Use Topics    Alcohol use: Never    Drug use: Never       Review of Systems   Constitutional: Negative for fever  HENT: Negative for congestion and ear pain  Gastrointestinal: Negative for diarrhea  All other systems reviewed and are negative  Physical Exam  Physical Exam  Vitals and nursing note reviewed  Constitutional:       Appearance: She is well-developed  She is ill-appearing  HENT:      Head: Normocephalic and atraumatic  Right Ear: External ear normal       Left Ear: External ear normal    Eyes:      Conjunctiva/sclera: Conjunctivae normal    Neck:      Thyroid: No thyromegaly  Vascular: No JVD  Trachea: No tracheal deviation  Cardiovascular:      Rate and Rhythm: Normal rate  Pulmonary:      Effort: Pulmonary effort is normal       Breath sounds: Normal breath sounds  No stridor  Abdominal:      General: There is no distension  Palpations: Abdomen is soft  There is no mass  Tenderness: There is no abdominal tenderness  There is no guarding  Hernia: No hernia is present  Musculoskeletal:         General: No tenderness or deformity  Normal range of motion  Lymphadenopathy:      Cervical: No cervical adenopathy  Skin:     General: Skin is warm  Coloration: Skin is not pale  Findings: No erythema or rash            Neurological:      Mental Status: She is alert and oriented to person, place, and time     Psychiatric:         Behavior: Behavior normal          Vital Signs  ED Triage Vitals   Temperature Pulse Respirations Blood Pressure SpO2   01/22/22 1351 01/22/22 1345 01/22/22 1345 01/22/22 1345 01/22/22 1345   97 7 °F (36 5 °C) 57 20 (!) 180/68 95 %      Temp Source Heart Rate Source Patient Position - Orthostatic VS BP Location FiO2 (%)   01/22/22 1351 01/22/22 1345 01/22/22 1345 01/22/22 1345 --   Oral Monitor Lying Right arm       Pain Score       01/22/22 1415       10 - Worst Possible Pain           Vitals:    01/24/22 0718 01/24/22 0930 01/24/22 0935 01/24/22 1516   BP: (!) 191/52 (!) 137/103 (!) 137/103 152/86   Pulse: 71 76 75 72   Patient Position - Orthostatic VS:             Visual Acuity      ED Medications  Medications   simethicone (MYLICON) chewable tablet 80 mg (has no administration in time range)   aluminum-magnesium hydroxide-simethicone (MYLANTA) oral suspension 30 mL (has no administration in time range)   ondansetron (ZOFRAN) injection 4 mg (has no administration in time range)   polyethylene glycol (MIRALAX) packet 17 g (has no administration in time range)   insulin lispro (HumaLOG) 100 units/mL subcutaneous injection 1-6 Units (2 Units Subcutaneous Given 1/24/22 1159)   insulin lispro (HumaLOG) 100 units/mL subcutaneous injection 1-5 Units (2 Units Subcutaneous Given 1/23/22 2302)   ceftriaxone (ROCEPHIN) 1 g/50 mL in dextrose IVPB (1,000 mg Intravenous New Bag 1/23/22 2302)   allopurinol (ZYLOPRIM) tablet 100 mg (100 mg Oral Given 1/24/22 0929)   amLODIPine (NORVASC) tablet 2 5 mg (2 5 mg Oral Given 1/24/22 0931)   chlorthalidone tablet 25 mg (25 mg Oral Given 1/24/22 0931)   cloNIDine (CATAPRES-TTS-3) 0 3 mg/24 hr TD weekly patch (0 3 mg Transdermal Medication Applied 1/22/22 2253)   clopidogrel (PLAVIX) tablet 75 mg (75 mg Oral Given 1/24/22 2981)   labetalol (NORMODYNE) tablet 300 mg (300 mg Oral Given 1/24/22 0098)   pantoprazole (PROTONIX) EC tablet 40 mg (40 mg Oral Given 1/24/22 0510)   heparin (porcine) subcutaneous injection 5,000 Units (5,000 Units Subcutaneous Given 1/24/22 1439)   sodium chloride 0 9 % infusion (75 mL/hr Intravenous New Bag 1/24/22 0929)   sodium chloride 0 9 % infusion (50 mL/hr Intravenous New Bag 1/22/22 2251)       Diagnostic Studies  Results Reviewed     Procedure Component Value Units Date/Time    Blood culture [[id number]] Collected: 01/22/22 1358    Lab Status: Preliminary result Specimen: Blood from Arm, Left Updated: 01/24/22 0101     Blood Culture No Growth at 24 hrs  Blood culture [[id number]] Collected: 01/22/22 1413    Lab Status: Preliminary result Specimen: Blood from Arm, Left Updated: 01/24/22 0101     Blood Culture No Growth at 24 hrs      Procalcitonin Reflex [[id number]]  (Abnormal) Collected: 01/23/22 0518    Lab Status: Final result Specimen: Blood from Arm, Right Updated: 01/23/22 0722     Procalcitonin 0 42 ng/ml     Basic metabolic panel [[id number]]  (Abnormal) Collected: 01/23/22 0518    Lab Status: Final result Specimen: Blood from Arm, Right Updated: 01/23/22 0604     Sodium 138 mmol/L      Potassium 3 4 mmol/L      Chloride 102 mmol/L      CO2 24 mmol/L      ANION GAP 12 mmol/L       mg/dL      Creatinine 2 34 mg/dL      Glucose 243 mg/dL      Calcium 9 0 mg/dL      eGFR 19 ml/min/1 73sq m     Narrative:      [place] guidelines for Chronic Kidney Disease (CKD):     Stage 1 with normal or high GFR (GFR > 90 mL/min/1 73 square meters)    Stage 2 Mild CKD (GFR = 60-89 mL/min/1 73 square meters)    Stage 3A Moderate CKD (GFR = 45-59 mL/min/1 73 square meters)    Stage 3B Moderate CKD (GFR = 30-44 mL/min/1 73 square meters)    Stage 4 Severe CKD (GFR = 15-29 mL/min/1 73 square meters)    Stage 5 End Stage CKD (GFR <15 mL/min/1 73 square meters)  Note: GFR calculation is accurate only with a steady state creatinine    Magnesium [[id number]]  (Normal) Collected: 01/23/22 0518    Lab Status: Final result Specimen: Blood from Arm, Right Updated: 01/23/22 0604     Magnesium 2 2 mg/dL     Phosphorus [[id number]]  (Normal) Collected: 01/23/22 0518    Lab Status: Final result Specimen: Blood from Arm, Right Updated: 01/23/22 0604     Phosphorus 3 2 mg/dL     CBC and differential [[id number]]  (Abnormal) Collected: 01/23/22 0518    Lab Status: Final result Specimen: Blood from Arm, Right Updated: 01/23/22 0540     WBC 6 62 Thousand/uL      RBC 3 08 Million/uL      Hemoglobin 9 3 g/dL      Hematocrit 26 8 %      MCV 87 fL      MCH 30 2 pg      MCHC 34 7 g/dL      RDW 13 0 %      MPV 10 8 fL      Platelets 275 Thousands/uL      nRBC 0 /100 WBCs      Neutrophils Relative 72 %      Immat GRANS % 2 %      Lymphocytes Relative 12 %      Monocytes Relative 12 %      Eosinophils Relative 2 %      Basophils Relative 0 %      Neutrophils Absolute 4 82 Thousands/µL      Immature Grans Absolute 0 12 Thousand/uL      Lymphocytes Absolute 0 78 Thousands/µL      Monocytes Absolute 0 79 Thousand/µL      Eosinophils Absolute 0 10 Thousand/µL      Basophils Absolute 0 01 Thousands/µL     HS Troponin I 4hr [[id number]] Collected: 01/22/22 1819    Lab Status: Final result Specimen: Blood from Arm, Left Updated: 01/22/22 1852     hs TnI 4hr 12 ng/L      Delta 4hr hsTnI -1 ng/L     Platelet count [[id number]]     Lab Status: No result Specimen: Blood     HS Troponin I 2hr [[id number]] Collected: 01/22/22 1633    Lab Status: Final result Specimen: Blood from Arm, Left Updated: 01/22/22 1701     hs TnI 2hr 12 ng/L      Delta 2hr hsTnI -1 ng/L     Procalcitonin [[id number]]  (Abnormal) Collected: 01/22/22 1406    Lab Status: Final result Specimen: Blood from Arm, Left Updated: 01/22/22 1446     Procalcitonin 0 59 ng/ml     HS Troponin 0hr (reflex protocol) [[id number]] Collected: 01/22/22 1358    Lab Status: Final result Specimen: Blood from Arm, Left Updated: 01/22/22 1445     hs TnI 0hr 13 ng/L     B-type natriuretic peptide [[id number]]  (Abnormal) Collected: 01/22/22 1358    Lab Status: Final result Specimen: Blood from Arm, Left Updated: 01/22/22 1440     NT-proBNP 1,302 pg/mL     Lipase [[id number]]  (Normal) Collected: 01/22/22 1358    Lab Status: Final result Specimen: Blood from Arm, Left Updated: 01/22/22 1440     Lipase 118 u/L     Magnesium [[id number]]  (Normal) Collected: 01/22/22 1358    Lab Status: Final result Specimen: Blood from Arm, Left Updated: 01/22/22 1440     Magnesium 2 2 mg/dL     Lactate Blood [[id number]]  (Normal) Collected: 01/22/22 1358    Lab Status: Final result Specimen: Blood from Arm, Left Updated: 01/22/22 1437     LACTIC ACID 1 6 mmol/L     Narrative:      Result may be elevated if tourniquet was used during collection      Comprehensive metabolic panel [[id number]]  (Abnormal) Collected: 01/22/22 1358    Lab Status: Final result Specimen: Blood from Arm, Left Updated: 01/22/22 1432     Sodium 135 mmol/L      Potassium 3 9 mmol/L      Chloride 99 mmol/L      CO2 22 mmol/L      ANION GAP 14 mmol/L       mg/dL      Creatinine 2 84 mg/dL      Glucose 261 mg/dL      Calcium 9 2 mg/dL      Corrected Calcium 10 1 mg/dL      AST 22 U/L      ALT 15 U/L      Alkaline Phosphatase 103 U/L      Total Protein 7 1 g/dL      Albumin 2 9 g/dL      Total Bilirubin 0 54 mg/dL      eGFR 15 ml/min/1 73sq m     Narrative:      [employer] guidelines for Chronic Kidney Disease (CKD):     Stage 1 with normal or high GFR (GFR > 90 mL/min/1 73 square meters)    Stage 2 Mild CKD (GFR = 60-89 mL/min/1 73 square meters)    Stage 3A Moderate CKD (GFR = 45-59 mL/min/1 73 square meters)    Stage 3B Moderate CKD (GFR = 30-44 mL/min/1 73 square meters)    Stage 4 Severe CKD (GFR = 15-29 mL/min/1 73 square meters)    Stage 5 End Stage CKD (GFR <15 mL/min/1 73 square meters)  Note: GFR calculation is accurate only with a steady state creatinine    Protime-INR [[id number]]  (Normal) Collected: 01/22/22 1358    Lab Status: Final result Specimen: Blood from Arm, Left Updated: 01/22/22 1430     Protime 13 5 seconds      INR 1 07    APTT [[id number]]  (Normal) Collected: 01/22/22 1358    Lab Status: Final result Specimen: Blood from Arm, Left Updated: 01/22/22 1430     PTT 29 seconds     Blood gas, venous [[id number]]  (Abnormal) Collected: 01/22/22 1406    Lab Status: Final result Specimen: Blood from Arm, Left Updated: 01/22/22 1420     pH, Fei 7 402     pCO2, Fei 34 6 mm Hg      pO2, Fei 56 9 mm Hg      HCO3, Fei 21 0 mmol/L      Base Excess, Fei -3 2 mmol/L      O2 Content, Fei 13 1 ml/dL      O2 HGB, VENOUS 88 5 %     CBC and differential [[id number]]  (Abnormal) Collected: 01/22/22 1358    Lab Status: Final result Specimen: Blood from Arm, Left Updated: 01/22/22 1419     WBC 6 75 Thousand/uL      RBC 3 22 Million/uL      Hemoglobin 9 6 g/dL      Hematocrit 27 8 %      MCV 86 fL      MCH 29 8 pg      MCHC 34 5 g/dL      RDW 13 1 %      MPV 10 8 fL      Platelets 836 Thousands/uL      nRBC 0 /100 WBCs      Neutrophils Relative 73 %      Immat GRANS % 2 %      Lymphocytes Relative 12 %      Monocytes Relative 12 %      Eosinophils Relative 1 %      Basophils Relative 0 %      Neutrophils Absolute 4 93 Thousands/µL      Immature Grans Absolute 0 12 Thousand/uL      Lymphocytes Absolute 0 81 Thousands/µL      Monocytes Absolute 0 80 Thousand/µL      Eosinophils Absolute 0 06 Thousand/µL      Basophils Absolute 0 03 Thousands/µL     Urinalysis with culture and sensitivity reflex [[id number]]     Lab Status: No result Specimen: Urine                  CT abdomen pelvis wo contrast   Final Result by Brandy Cortes MD (01/22 4517)      I do not see any abnormality in the area of clinical concern regarding buttocks ulceration  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  Left renal atrophy        Mild Covid pneumonia      Mild fatty infiltration of liver Uncomplicated small fat-containing umbilical hernia      Calcified uterine leiomyoma      The study was marked in EPIC for immediate notification  Workstation performed: [id number]         XR chest portable   Final Result by Rachel Hale MD (01/23 8319)      Bilateral infiltrates compatible with confirmed COVID 19 infection  Workstation performed: [id number]                    Procedures  Procedures         ED Course                                             MDM  Number of Diagnoses or Management Options  Decubitus ulcer of buttock: new and requires workup  Gastric mass: new and requires workup     Amount and/or Complexity of Data Reviewed  Clinical lab tests: ordered and reviewed  Tests in the radiology section of CPT®: ordered and reviewed  Decide to obtain previous medical records or to obtain history from someone other than the patient: yes  Review and summarize past medical records: yes    Patient Progress  Patient progress: stable      Disposition  Final diagnoses:   Decubitus ulcer of buttock   Gastric mass     Time reflects when diagnosis was documented in both MDM as applicable and the Disposition within this note     Time User Action Codes Description Comment    1/22/2022  4:34 PM Tessa German Add [X16 [id number]] Decubitus ulcer of buttock     1/22/2022  4:34 PM Tessa German Add [K31 89] Gastric mass     3/28/6014  4:83 PM Reshma Sampson Add [H09 9] Failure to thrive in adult       ED Disposition     ED Disposition Condition Date/Time Comment    Admit Stable Sat Jan 22, 2022  4:34 PM Case was discussed with Dr Bryan Torres and the patient's admission status was agreed to be Admission Status: inpatient status to the service of Dr Bryan Torres           Follow-up Information    None         Current Discharge Medication List      CONTINUE these medications which have NOT CHANGED    Details   allopurinol (ZYLOPRIM) 100 mg tablet TAKE 1 TABLET BY MOUTH EVERY DAY  Qty: 90 tablet, Refills: 2    Associated Diagnoses: Hyperuricemia      chlorthalidone 25 mg tablet Take 1 tablet (25 mg total) by mouth daily  Qty: 90 tablet, Refills: 1    Associated Diagnoses: Hypertension, unspecified type; Dependent edema; Rash; Hypertension associated with diabetes ([facility] ); [facility]iliac stenosis, [facility] ([facility] ); History of CVA (cerebrovascular accident); Claudication in peripheral vascular disease ([facility] ); Basilar artery stenosis; Hyperlipidemia associated with type 2 diabetes mellitus ([facility])      Cholecalciferol 50 MCG (2000 UT) TABS Take 1 tablet (2,000 Units total) by mouth daily    Associated Diagnoses: Vitamin D deficiency      cloNIDine (CATAPRES-TTS-3) 0 3 mg/24 hr PLACE 1 PATCH (0 3 MG TOTAL) ON THE SKIN ONCE A WEEK  Qty: 12 patch, Refills: 2    Associated Diagnoses: Hypertension associated with diabetes ([facility] ); Rash; Hypertension, unspecified type; Dependent edema; Aortoiliac stenosis, left ([facility] ); History of CVA (cerebrovascular accident); Claudication in peripheral vascular disease ([facility] ); Basilar artery stenosis; Hyperlipidemia associated with type 2 diabetes mellitus ([facility])      clopidogrel (PLAVIX) 75 mg tablet TAKE 1 TABLET BY MOUTH EVERY DAY  Qty: 90 tablet, Refills: 1    Associated Diagnoses: History of CVA (cerebrovascular accident); Claudication in peripheral vascular disease ([facility] ); Basilar artery stenosis; Rash; Hypertension associated with diabetes ([facility] ); Hypertension, unspecified type; Dependent edema; Aortoiliac stenosis, left ([facility] );  Hyperlipidemia associated with type 2 diabetes mellitus ([facility])      Icosapent Ethyl (Vascepa) 1 g CAPS 2 CAP TWICE A DAY WITH FOOD  Qty: 360 capsule, Refills: 2    Associated Diagnoses: Hyperlipidemia associated with type 2 diabetes mellitus ([facility])      labetalol (NORMODYNE) 300 mg tablet TAKE 1 TABLET BY MOUTH TWICE A DAY  Qty: 180 tablet, Refills: 4    Associated Diagnoses: Acute drug-induced gout of left foot      losartan (COZAAR) 100 MG tablet TAKE 1 5 TABLETS BY MOUTH DAILY  Qty: 135 tablet, Refills: 1    Associated Diagnoses: Hypertension associated with diabetes ([facility] ); Rash; Hypertension, unspecified type; Dependent edema; Aortoiliac stenosis, left ([facility] ); History of CVA (cerebrovascular accident); Claudication in peripheral vascular disease ([facility] ); Basilar artery stenosis; Hyperlipidemia associated with type 2 diabetes mellitus ([facility])      amLODIPine (NORVASC) 2 5 mg tablet Take 1 tablet (2 5 mg total) by mouth daily  Qty: 90 tablet, Refills: 2    Associated Diagnoses: Hypertension associated with diabetes ([facility] ); Rash; Hypertension, unspecified type; Dependent edema; Aortoiliac stenosis, left ([facility] ); History of CVA (cerebrovascular accident); Claudication in peripheral vascular disease ([facility] ); Basilar artery stenosis; Hyperlipidemia associated with type 2 diabetes mellitus ([facility])      lidocaine (LIDODERM) 5 % APPLY 1 PATCH TOPICALLY DAILY REMOVE & DISCARD PATCH WITHIN 12 HOURS OR AS DIRECTED BY MD  Qty: 30 patch, Refills: 6    Associated Diagnoses: Spondylosis of lumbar region without myelopathy or radiculopathy             No discharge procedures on file      PDMP Review       Value Time User    PDMP Reviewed  Yes 4/6/2020  8:10 AM Charisma Kathleen MD          ED Provider  Electronically Signed by           Rachelle Otero DO  01/24/22 7656

## 2022-01-24 NOTE — ASSESSMENT & PLAN NOTE
· Positive for COVID-19 on 01/18  · CXR with b/l infiltrates  · POX stable, Remains O2 independent  · No indication for treatment  · Elevated d-dimer in setting of covid pna and possible underlying malignancy  No sign of hypoxemia  Pt not candidate for CTA chest given CKD  Will obtain VAS LE doppler

## 2022-01-24 NOTE — PROGRESS NOTES
[facility]  Progress Note - Dianna Alicia 1945, 78 y o  female MRN: [id number]  Unit/Bed#: -01 Encounter: [id number]  Primary Care Provider: GRACIELA Blancas   Date and time admitted to hospital: 1/22/2022  1:42 PM    * COVID-19  Assessment & Plan  · Positive for COVID-19 on 01/18  · CXR with b/l infiltrates  · POX stable, Remains O2 independent  · No indication for treatment  · Elevated d-dimer in setting of covid pna and possible underlying malignancy  No sign of hypoxemia  Pt not candidate for CTA chest given CKD  Will obtain VAS LE doppler  Failure to thrive in adult  Assessment & Plan  · Likely due to previous COVID infection, and possibly UTI  · S/p PT/OT recommending [facility]    RICARDO (acute kidney injury) ([facility] )  Assessment & Plan  · On CKD stg III; baseline cr of 1 5-1 9  · Hold home losartan, chlorthalidone  · Cont IVF hydration  · Cont to monitor    UTI (urinary tract infection)  Assessment & Plan  · Positive UA, patient complaining malaise and fatigue; elevated procal  · Cont IV ceftriaxone  · Urine culture unfortunately was not obtained prior to abx    Gastrointestinal stromal tumor (GIST) ([facility] )  Assessment & Plan  · Found on outpatient imaging was planned on outpt eval however pt's family insistent on GI consult while admitted  · S/p GI consult, plans on egd/eus upon resolve of covid    Sacral ulcer ([facility] )  Assessment & Plan  · POA, no sign of acute infection  · Wound consult; cont wound care    VTE Pharmacologic Prophylaxis:   Pharmacologic: Heparin  Mechanical VTE Prophylaxis in Place: No    Patient Centered Rounds: I have performed bedside rounds with nursing staff today  Discussions with Specialists or Other Care Team Provider:     Education and Discussions with Family / Patient:  Patient  In called her son Pablo Gipson    Time Spent for Care: 30 minutes  More than 50% of total time spent on counseling and coordination of care as described above      Current Length of Stay: 2 day(s)    Current Patient Status: Inpatient   Certification Statement: The patient will continue to require additional inpatient hospital stay due to [address]    Discharge Plan:  DC planning to home with home care services possibly tomorrow    Code Status: Level 1 - Full Code      Subjective:   Sitting up in chair  No sign of resp distress  Remains oxygen-dependent  Denies dyspnea or chest pain  Afebrile, nontoxic appearing    Objective:     Vitals:   Temp (24hrs), Av 1 °F (36 7 °C), Min:98 °F (36 7 °C), Max:98 1 °F (36 7 °C)    Temp:  [98 °F (36 7 °C)-98 1 °F (36 7 °C)] 98 1 °F (36 7 °C)  HR:  [65-76] 67  Resp:  [16-19] 16  BP: (137-191)/() 163/68  SpO2:  [94 %-96 %] 96 %  Body mass index is 28 45 kg/m²  Input and Output Summary (last 24 hours): Intake/Output Summary (Last 24 hours) at 2022 1742  Last data filed at 2022 1300  Gross per 24 hour   Intake 540 ml   Output --   Net 540 ml       Physical Exam:     Physical Exam  Cardiovascular:      Rate and Rhythm: Normal rate and regular rhythm  Pulses: Normal pulses  Heart sounds: Normal heart sounds  No murmur heard  Pulmonary:      Effort: Pulmonary effort is normal  No respiratory distress  Breath sounds: Normal breath sounds  No wheezing or rales  Abdominal:      General: Abdomen is flat  Bowel sounds are normal  There is no distension  Palpations: Abdomen is soft  Tenderness: There is no abdominal tenderness  There is no guarding  Musculoskeletal:         General: Normal range of motion  Cervical back: Normal range of motion and neck supple  Right lower leg: No edema  Left lower leg: No edema  Skin:     General: Skin is warm and dry  Neurological:      General: No focal deficit present  Mental Status: She is alert and oriented to person, place, and time  Mental status is at baseline  Cranial Nerves: No cranial nerve deficit  Motor: No weakness  Additional Data:     Labs:    Results from last 7 days   Lab Units 01/24/22  0510 01/23/22  0518 01/23/22  0518   WBC Thousand/uL 7 41   < > 6 62   HEMOGLOBIN g/dL 9 4*   < > 9 3*   HEMATOCRIT % 27 5*   < > 26 8*   PLATELETS Thousands/uL 310   < > 254   NEUTROS PCT %  --   --  72   LYMPHS PCT %  --   --  12*   MONOS PCT %  --   --  12   EOS PCT %  --   --  2    < > = values in this interval not displayed  Results from last 7 days   Lab Units 01/24/22  0510 01/23/22  0518 01/22/22  1358   SODIUM mmol/L 140   < > 135*   POTASSIUM mmol/L 3 6   < > 3 9   CHLORIDE mmol/L 103   < > 99*   CO2 mmol/L 25   < > 22   BUN mg/dL 87*   < > 132*   CREATININE mg/dL 2 15*   < > 2 84*   ANION GAP mmol/L 12   < > 14*   CALCIUM mg/dL 9 4   < > 9 2   ALBUMIN g/dL  --   --  2 9*   TOTAL BILIRUBIN mg/dL  --   --  0 54   ALK PHOS U/L  --   --  103   ALT U/L  --   --  15   AST U/L  --   --  22   GLUCOSE RANDOM mg/dL 259*   < > 261*    < > = values in this interval not displayed  Results from last 7 days   Lab Units 01/22/22  1358   INR  1 07     Results from last 7 days   Lab Units 01/24/22  1110 01/24/22  0630 01/23/22  2045 01/23/22  1530 01/23/22  1126 01/23/22  0607 01/22/22  2154   POC GLUCOSE mg/dl 221* 328* 246* 263* 251* 242* 360*         Results from last 7 days   Lab Units 01/23/22  0518 01/22/22  1406 01/22/22  1358   LACTIC ACID mmol/L  --   --  1 6   PROCALCITONIN ng/ml 0 42* 0 59*  --            * I Have Reviewed All Lab Data Listed Above  * Additional Pertinent Lab Tests Reviewed: All Labs Within Last 24 Hours Reviewed    Imaging:    Imaging Reports Reviewed Today Include:   Imaging Personally Reviewed by Myself Includes:      Recent Cultures (last 7 days):     Results from last 7 days   Lab Units 01/22/22  1413 01/22/22  1358   BLOOD CULTURE  No Growth at 24 hrs  No Growth at 24 hrs         Last 24 Hours Medication List:   Current Facility-Administered Medications   Medication Dose Route Frequency Provider Last Rate    allopurinol  100 mg Oral Daily Sriram Chen MD      aluminum-magnesium hydroxide-simethicone  30 mL Oral Q6H PRN Sriram Chen MD      amLODIPine  2 5 mg Oral Daily Sriram Chen MD      cefTRIAXone  1,000 mg Intravenous H70W Sriram Chen MD 6,611 mg (01/23/22 2309)    chlorthalidone  25 mg Oral Daily Sriram Chen MD      cloNIDine  1 patch Transdermal Weekly Sriram Chen MD      clopidogrel  75 mg Oral Daily Sriram Chen MD      heparin (porcine)  5,000 Units Subcutaneous [facility] Sriram Chen MD      insulin lispro  1-5 Units Subcutaneous HS Sriram Chen MD      insulin lispro  1-6 Units Subcutaneous TID [facility] Sriram Chen MD      labetalol  300 mg Oral BID Sriram Chen MD      ondansetron  4 mg Intravenous Q6H PRN Sriram Chen MD      pantoprazole  40 mg Oral Early Morning Sriram Chen MD      polyethylene glycol  17 g Oral Daily PRN Sriram Chen MD      simethicone  80 mg Oral 4x Daily PRN Sriram Chen MD      sodium chloride  75 mL/hr Intravenous Continuous Brennan Suh DO 75 mL/hr (01/24/22 2872)        Today, Patient Was Seen By: Brennan Suh DO    ** Please Note: Dictation voice to text software may have been used in the creation of this document   **

## 2022-01-24 NOTE — OCCUPATIONAL THERAPY NOTE
Occupational Therapy Evaluation Note        Patient Name: Liss Aburto  FFFLQ'D Date: 1/24/2022 01/24/22 0840   OT Last Visit   OT Visit Date 01/24/22   Note Type   Note type Evaluation   Restrictions/Precautions   Weight Bearing Precautions Per Order No   Braces or Orthoses Other (Comment)  (none per pt)   Other Precautions Contact/isolation; Airborne/isolation; Chair Alarm; Bed Alarm;Multiple lines; Fall Risk  (COVID-19 confirmed)   Pain Assessment   Pain Assessment Tool 0-10   Pain Score   (none currently, just "sore")   Pain Location/Orientation Location: Buttocks   Pain Onset/Description Descriptor: Sore   Home Living   Type of [address] [place] [address] One level;Performs ADLs on one level;Ramped entrance   Bathroom Shower/Tub Walk-in shower   Bathroom Toilet Standard   Bathroom Equipment Shower chair   [facility]; Wheelchair-manual;Quad cane   Additional Comments patient ambulatory with QC at baseline   Prior Function   Level of Carter Independent with ADLs and functional mobility   Lives With Spouse; Son   Ricky Help From Family   ADL Assistance Independent   IADLs Independent   Falls in the last 6 months 0   Vocational Retired   Comments (-) drives, son provides transportation   Lifestyle   Autonomy Per patient report, she lives with her spouse and son in a one-story home with a ramped entrance  At baseline, patient reports that she is independent in both ADLs and IADLs and is ambulatory with a quad cane  Reciprocal Relationships Supportive family, son works during the night   Service to Others Retired- , Emy   Intrinsic Gratification Gardening, outdoor work   Psychosocial   Psychosocial ([address] [employer] [facility]) [address]  6  Modified independent   [address] [place] [address] 5  Supervision/Setup   Grooming Deficit Increased time to complete;Supervision/safety; Teeth care  (Standing at sink level)   UB Bathing Assistance 5  Supervision/Setup LB Bathing Assistance 4  Minimal Assistance   UB Dressing Assistance 5  Supervision/Setup   LB Dressing Assistance 4  Minimal Assistance   Toileting Assistance  5  Supervision/Setup   Functional Assistance 4  Minimal Assistance   Additional Comments ADL assist levels based on pt's functional performance during OT evaluation   Bed Mobility   Additional Comments Patient received seated OOB to recliner chair upon OT arrival; at end of session: pt returned seated to recliner chair left in care of PT   Transfers   Sit to Stand 4  Minimal assistance   Additional items Assist x 1; Increased time required;Verbal cues;Armrests   Stand to Sit 4  Minimal assistance   Additional items Assist x 1; Armrests; Increased time required;Verbal cues   Toilet transfer 4  Minimal assistance   Additional items Assist x 1; Increased time required;Verbal cues; Commode  (BSC frame over standard toilet)   Additional Comments Performed functional transfers using RW   Functional Mobility   Functional Mobility 4  Minimal assistance   Additional Comments CGA x1; Ambulation to and from bathroom with no overt LOB   Additional items Rolling walker   Balance   Static Sitting Good   Dynamic Sitting Fair +   Static Standing Fair   Dynamic Standing Fair -   Ambulatory Fair -   Activity Tolerance   Activity Tolerance Patient limited by fatigue   Medical Staff Made Aware PT Cori Mckenzie   Nurse Made Aware RN Franklyn Lau confirmed pt appropriate for therapy   RUE Assessment   RUE Assessment X  (AROM WFL; strength grossly 4-/5 proximally, 3+/5 distally)   LUE Assessment   LUE Assessment X  (AROM WFL; strength grossly 4-/5 proximally, 3+/5 distally)   Hand Function   Gross Motor Coordination Functional   Fine Motor Coordination Functional   Sensation   Light Touch No apparent deficits  (BUEs)   Vision-Basic Assessment   Current Vision Wears glasses all the time  ("most of the time")   Cognition   Overall Cognitive Status [facility]   Arousal/Participation Alert; Responsive; Cooperative   Attention Within functional limits   Orientation Level Oriented X4   Memory Within functional limits   Following Commands Follows all commands and directions without difficulty   Comments Patient agreeable to OT evaluation   Assessment   Limitation Decreased ADL status; Decreased UE strength;Decreased endurance;Decreased self-care trans;Decreased high-level ADLs   Prognosis Good   Assessment Patient is a 78 y o  female seen for OT evaluation s/p admit to [address] on 1/22/2022 w/COVID-19  Commorbidities affecting patient's functional performance at time of assessment include: failure to thrive in adult, RICARDO, UTI, GIST, and sacral ulcer  Patient  has a past medical history of Arthritis, Chronic kidney disease, Endometriosis, High cholesterol, Hypertension, Kidney disease, chronic, stage III (moderate, EGFR 30-59 ml/min) (HCC), Lumbar disc herniation, Neuropathy, Peripheral vascular disease ([facility] ), Pneumonia, Shoulder injury, Spinal stenosis, and Stroke ([facility] ) (2015)  Orders placed for OT evaluation and treatment  Performed at least two patient identifiers during session including name and wristband  Prior to admission, patient was living with her spouse and son in a one-story home with a ramped entrance  At baseline, patient reports that she is independent in both ADLs and IADLs and is ambulatory with a quad cane  Personal factors affecting patient at time of initial evaluation include: difficulty performing ADLs and difficulty performing IADLs  Upon evaluation, patient requires supervision and set up assist for UB ADLs, minimal  assist for LB ADLs, transfers and functional ambulation in room and bathroom with contact guard and minimal  assist, with the use of [address]  Patient is alert and oriented x 4   Occupational performance is affected by the following deficits: decreased muscle strength, dynamic sit/ stand balance deficit with poor standing tolerance time for self care and functional mobility and decreased activity tolerance, and decreased physical endurance and stamina  Patient to benefit from continued Occupational Therapy treatment while in the hospital to address deficits as defined above and maximize level of functional independence with ADLs and functional mobility  Occupational Performance areas to address include: grooming , bathing/ shower, dressing, toilet hygiene, transfer to all surfaces, functional mobility, health maintenance, IADLS: Household maintenance, IADLs: safety procedures and Leisure Participation  From OT standpoint, recommendation at time of d/c would be Home with family support and Home OT  Goals   Patient Goals to get stronger; get back to doing yardwork   Plan   Treatment Interventions ADL retraining;Functional transfer training;UE strengthening/ROM; Endurance training;Patient/family training; Compensatory technique education; Energy conservation; Activityengagement;Continued evaluation   Goal Expiration Date 02/03/22   OT Treatment Day 0   OT Frequency 2-3x/wk   Recommendation   OT Discharge Recommendation Home with home health rehabilitation   Additional Comments  The patient's raw score on the AM-PAC Daily Activity inpatient short form is 19, standardized score is 40 22, greater than 39 4  Patients at this level are likely to benefit from discharge to home  Please refer to the recommendation of the Occupational Therapist for safe discharge planning      AM-PAC Daily Activity Inpatient   Lower Body Dressing 3   Bathing 3   Toileting 3   Upper Body Dressing 3   Grooming 3   Eating 4   Daily Activity Raw Score 19   Daily Activity Standardized Score (Calc for Raw Score >=11) 40 22   AM-[facility] Applied Cognition Inpatient   Following a Speech/Presentation 4   Understanding Ordinary Conversation 4   Taking Medications 4   Remembering Where Things Are Placed or Put Away 4   Remembering List of 4-5 Errands 4   Taking Care of Complicated Tasks 4   Applied Cognition Raw Score 24   Applied Cognition Standardized Score 62 21   Barthel Index   Feeding 10   Bathing 0   Grooming Score 5   Dressing Score 5   Bladder Score 10   Bowels Score 10   Toilet Use Score 5   Transfers (Bed/Chair) Score 10   Mobility (Level Surface) Score 0   Stairs Score 0   Barthel Index Score 55   Modified Morrison Scale   Modified Maggy Scale 4     Occupational Therapy Goals to be completed in 7-10 Days:    1 - Patient will verbalize and demonstrate use of energy conservation/ deep breathing technique and work simplification skills during functional activity with no verbal cues  2 - Patient will verbalize and demonstrate good body mechanics and joint protection techniques during  ADLs/ IADLs with no verbal cues  3 - Patient will increase OOB/ sitting tolerance to 4-6 hours per day for increased participation in self care and leisure tasks with no s/s of exertion  4 - Patient will increase standing tolerance time to 10 minutes with unilateral UE support to complete sink level ADLs@ mod I level  5 - Patient will increase sitting tolerance at edge of bed to 30 minutes to complete UB ADLs @ set up assist level  6 - Patient will transfer bed to Chair / toilet at Mod I assist level with AD as indicated  7 - Patient will complete UB ADLs with Mod I assist      8 - Patient will complete LB ADLs with supervision/setup assist with the use of adaptive equipment as indicated  9 - Patient will complete toileting hygiene with Mod I assist/ supervision for thoroughness  8 - Patient/ Family will demonstrate competency with UE Home Exercise Program      11- Patient will identify s/s of exertion during ADL and functional mobility with no verbal cues  12- Patient will verbalize/ demonstrate compensatory strategies to recover from exertion with no verbal cues       [place] Dose, OTR/L

## 2022-01-24 NOTE — DISCHARGE INSTR - OTHER ORDERS
Skin care Plan:  1-Cleanse sacro-buttocks with soap and water  Apply layer of Xeroform over open wound beds and cover with Allevyn foam  Fabián with T for treatment  Change every other day and PRN  Peel back and check skin daily  2-Turn/reposition q2h for pressure re-distribution on skin   3-Elevate heels to offload pressure  4-Moisturize skin daily with skin nourishing cream  5-Ehob cushion in chair when out of bed  6-Hydraguard to bilateral heels BID and PRN

## 2022-01-24 NOTE — PLAN OF CARE
Problem: OCCUPATIONAL THERAPY ADULT  Goal: Performs self-care activities at highest level of function for planned discharge setting  See evaluation for individualized goals  Description: Treatment Interventions: ADL retraining,Functional transfer training,UE strengthening/ROM,Endurance training,Patient/family training,Compensatory technique education,Energy conservation,Activityengagement,Continued evaluation          See flowsheet documentation for full assessment, interventions and recommendations  Note: Limitation: Decreased ADL status,Decreased UE strength,Decreased endurance,Decreased self-care trans,Decreased high-level ADLs  Prognosis: Good  Assessment: Patient is a 78 y o  female seen for OT evaluation s/p admit to [address] [place] [address] on 1/22/2022 w/COVID-19  Commorbidities affecting patient's functional performance at time of assessment include: failure to thrive in adult, RICARDO, UTI, GIST, and sacral ulcer  Patient  has a past medical history of Arthritis, Chronic kidney disease, Endometriosis, High cholesterol, Hypertension, Kidney disease, chronic, stage III (moderate, EGFR 30-59 ml/min) (HCC), Lumbar disc herniation, Neuropathy, Peripheral vascular disease ([facility] ), Pneumonia, Shoulder injury, Spinal stenosis, and Stroke ([facility] ) (2015)  Orders placed for OT evaluation and treatment  Performed at least two patient identifiers during session including name and wristband  Prior to admission, patient was living with her spouse and son in a one-story home with a ramped entrance  At baseline, patient reports that she is independent in both ADLs and IADLs and is ambulatory with a quad cane  Personal factors affecting patient at time of initial evaluation include: difficulty performing ADLs and difficulty performing IADLs   Upon evaluation, patient requires supervision and set up assist for UB ADLs, minimal  assist for LB ADLs, transfers and functional ambulation in room and bathroom with contact guard and minimal  assist, with the use of Rolling Walker  Patient is alert and oriented x 4  Occupational performance is affected by the following deficits: decreased muscle strength, dynamic sit/ stand balance deficit with poor standing tolerance time for self care and functional mobility and decreased activity tolerance, and decreased physical endurance and stamina  Patient to benefit from continued Occupational Therapy treatment while in the hospital to address deficits as defined above and maximize level of functional independence with ADLs and functional mobility  Occupational Performance areas to address include: grooming , bathing/ shower, dressing, toilet hygiene, transfer to all surfaces, functional mobility, health maintenance, IADLS: Household maintenance, IADLs: safety procedures and Leisure Participation  From OT standpoint, recommendation at time of d/c would be Home with family support and Home OT         OT Discharge Recommendation: Home with home health rehabilitation

## 2022-01-24 NOTE — PHYSICAL THERAPY NOTE
Physical Therapy Evaluation   Time in: 813  Time out: 835  Total evaluation time: 22 minutes    Patient's Name: Parul Flores    Admitting Diagnosis  SOB (shortness of breath) [R06 02]  Decubitus ulcer of buttock [L89 309]  Failure to thrive in adult [R62 7]  Gastric mass [K31 89]    Problem List  Patient Active Problem List   Diagnosis    Spinal stenosis    Basilar artery stenosis    Breast mass    Cerebrovascular accident (CVA) ([facility] )    Chronic GERD    Stage 3 chronic kidney disease ([facility] )    Claudication in peripheral vascular disease ([facility] )    Type 2 diabetes mellitus with diabetic nephropathy ([facility] )    Endometriosis    Gout    Hx-TIA (transient ischemic attack)    Hypercholesteremia    Hyperlipidemia associated with type 2 diabetes mellitus ([facility] )    Hypertension    Hypertension associated with diabetes ([facility] )    Osteoarthritis    Obesity (BMI 30-39  9)    Polyneuropathy    Right renal artery stenosis (HCC)    Tobacco use disorder    Vertebrobasilar artery syndrome    Vitamin D deficiency    Statin intolerance    Lumbar disc herniation    Pain of left lower extremity    Abnormal EKG    Spondylosis of lumbar region without myelopathy or radiculopathy    Aortoiliac stenosis, left (HCC)    Hypokalemia    Acute drug-induced gout of left foot    Decreased pulses in feet    Refused influenza vaccine    Hypercalcemia    Lumbosacral spondylosis without myelopathy    Neurodermatitis    Other proteinuria    Obesity with body mass index 30 or greater    Hyperlipidemia, unspecified    Herniated lumbar intervertebral disc    Atherosclerosis of renal artery (HCC)    Celiac artery stenosis (HCC) >70% stenosis in the celiac trunk    Abnormal CT of the chest suspicious for an infectious or inflammatory bronchiolitis      Positive cardiac stress test  small, mildly severe, partially reversible myocardial perfusion defect of anterior and inferior wall     Femoral artery stenosis, right ([facility] ) 50-75% stenosis in the common femoral artery      Mesenteric artery stenosis (HCC)    Immunization not carried out because of patient refusal    Median arcuate ligament syndrome ([facility] )    Abdominal bruit    Initial Medicare annual wellness visit    Hypertensive kidney disease with stage 3 chronic kidney disease ([facility] )    Atherosclerosis of native arteries of extremities with intermittent claudication, bilateral legs (HCC)    Asymptomatic bilateral carotid artery stenosis    Pulmonary nodule 7 mm groundglass opacity in the right upper lobe, unchanged since October 2019    Stenosis of left anterior descending artery Mid LAD 50-60% stenosis    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    Urinary frequency    Malignant neoplasm of overlapping sites of bladder ([facility] )    Encounter for removal of ureteral stent    Cervical radiculopathy    Edema of left lower extremity    Stage 4 chronic kidney disease ([facility] )    Hypertensive kidney disease with stage 4 chronic kidney disease ([facility] )    Hyperuricemia    Embolism and 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 ([facility])    Depression, recurrent ([facility] )    Obesity, morbid ([facility] )   Jarett Rdz Person consulting for explanation of examination or test finding    Type 2 diabetes mellitus with chronic kidney disease ([facility] )    Type 2 diabetes mellitus with diabetic peripheral angiopathy without gangrene ([facility] )    Failure to thrive in adult    COVID-19    Sacral ulcer ([facility] )    UTI (urinary tract infection)    RICARDO (acute kidney injury) ([facility] )    Gastrointestinal stromal tumor (GIST) ([facility] )       Past Medical History  Past Medical History:   Diagnosis Date    Arthritis     Chronic kidney disease     Endometriosis     High cholesterol     Hypertension     Kidney disease, chronic, stage III (moderate, EGFR 30-59 ml/min) ([facility])     Lumbar disc herniation     Neuropathy     Peripheral vascular disease ([address] )     Pneumonia     Shoulder injury     left    Spinal stenosis     Stroke [facility]) 2015    Memory loss       Past Surgical History  Past Surgical History:   Procedure Laterality Date    CARDIAC CATHETERIZATION      COLONOSCOPY      FL RETROGRADE PYELOGRAM  4/6/2020    FRACTURE SURGERY Right     ankle    OVARIAN CYST SURGERY      [place] CYSTOSCOPY,INSERT URETERAL STENT Right 4/6/2020    Procedure: INSERTION STENT URETERAL;  Surgeon: Tarun Ellington MD;  [facility];  Service: Urology    IL CYSTOURETHROSCOPY,FULGUR 0 5-2 CM LESN N/A 4/6/2020    Procedure: TRANSURETHRAL RESECTION OF BLADDER TUMOR (TURBT); Surgeon: Tarun Ellington MD;  [facility];  Service: Urology    IL CYSTOURETHROSCOPY,URETER CATHETER Bilateral 4/6/2020    Procedure: CYSTOSCOPY WITH RETROGRADE PYELOGRAM;  Surgeon: Tarun Ellington MD;  [facility];  Service: Urology    ROTATOR CUFF REPAIR Left     TONSILLECTOMY         PT performed at least 2 patient identifiers during session: Name and wristband  01/24/22 0813   PT Last Visit   PT Visit Date 01/24/22   Note Type   Note type Evaluation   Pain Assessment   Pain Assessment Tool 0-10   Pain Score   (none currently, just "sore")   Pain Location/Orientation Location: Buttocks   Pain Onset/Description Descriptor: Sore   Restrictions/Precautions   Weight Bearing Precautions Per Order No   Braces or Orthoses   (none per pt)   Other Precautions Contact/isolation; Airborne/isolation; Chair Alarm; Bed Alarm;O2;Fall Risk;Multiple lines  (+COVID19)   Home Living   Type of [address] [place] [address] One level;Ramped entrance   Bathroom Shower/Tub Walk-in shower   [address] [place] [address] Dr chair   [address] cane;Walker; Wheelchair-manual   Additional Comments pt uses QC at baseline   Prior Function   Level of [place] Independent with ADLs and functional mobility   Lives With Spouse; Son   Moni Gains Help From Family   ADL Assistance Independent  (per pt)   IADLs Independent   Falls in the last 6 months 0   Vocational Retired  (, [address])   Comments (-) , son A c transportation   General   Family/Caregiver Present No   Cognition   Overall Cognitive Status WFL   Arousal/Participation Alert   Orientation Level Oriented X4   Memory Within functional limits   Following Commands Follows all commands and directions without difficulty   Comments pt agreeable to PT eval   RUE Assessment   RUE Assessment   (defer to OT eval for comments)   LUE Assessment   LUE Assessment   (defer to OT eval for comments)   Vision-Basic Assessment   Current Vision Wears glasses all the time  ("most of the time")   Coordination   Sensation [facility]   Light Touch   RLE Light Touch Grossly intact   LLE Light Touch Grossly intact   Wound 01/22/22 Buttocks Medial;Inner   Date First Assessed/Time First Assessed: 01/22/22 1420   Location: Buttocks  Wound Location Orientation: Medial;Inner   Wound Description MARIS   Bed Mobility   Additional Comments pt received OOB to recliner upon arrival   Transfers   Sit to Stand 4  Minimal assistance   Additional items Assist x 1; Armrests; Increased time required;Verbal cues   Stand to Sit 4  Minimal assistance   Additional items Assist x 1; Armrests; Increased time required;Verbal cues   Ambulation/Elevation   Gait pattern Decreased foot clearance; Excessively slow; Short stride; Step to   Gait Assistance 4  Minimal assist   Additional items Assist x 1;Verbal cues   Assistive Device Rolling walker   Distance 18' x 2   Balance   Static Sitting Good   Dynamic Sitting Fair +   Static Standing Fair   Dynamic Standing Fair -   Ambulatory Fair -   Endurance Deficit   Endurance Deficit Yes   Activity Tolerance   Activity Tolerance Patient limited by fatigue   Medical Staff Made Aware care coordination with OT Mak Gomez   Nurse Made Aware ZULEYMA Huizar   Assessment   Prognosis Good   Problem List Decreased strength;Decreased endurance; Impaired balance;Decreased mobility;Pain;Decreased skin integrity   Assessment Pt is 78 y o  female seen for high-complexity PT evaluation on 1/24/2022 s/p admit to [facility] on 1/22/2022 w/ COVID-19  PT was consulted to assess pt's functional mobility and d/c needs  Order placed for PT eval and tx, w/ up w/ A order  PTA, pt resides c spouse and son in [facility] c ramped entrance, typically ambulatory with QC  At time of eval, pt performing all phases of mobility at min A level c use of RW for household distance gait trial  Upon evaluation, pt presenting with impaired functional mobility d/t decreased strength, decreased endurance, impaired balance, decreased mobility, pain and activity intolerance  Pertinent PMHx and current co-morbidities affecting pt's physical performance at time of assessment include: COVID19, failure to thrive in adult, sacral ulcer, UTI, RICARDO, GIST, spinal stenosis, basilar artery stenosis, CVA, chronic GERD, gout, HTN, OA, obesity, polyneuropathy, tobacco use disorder, lumbar disc herniation, hypercalcemia, neurodermatitis, cervical radiculopathy  Personal factors affecting pt at time of eval include: ambulating w/ assistive device and inability to navigate community distances  The following objective measures performed on IE also reveal limitations: Barthel Index: 50/100, Modified [place]: 3 (moderate disability) and AM-PAC 6-Clicks: 45/72  Pt's clinical presentation is currently unstable/unpredictable seen in pt's presentation of abnormal lab value(s), need for input for task focus and mobility technique and ongoing medical assessment  Overall, pt's rehab potential and prognosis to return to PLOF is good as impacted by objective findings, warranting pt to receive further skilled PT interventions to address identified impairments, activity limitation(s), and participation restriction(s)   Pt to benefit from continued PT tx to address deficits as defined above and maximize level of functional independent mobility and consistency  From PT/mobility standpoint, recommendation at time of d/c would be home with home health rehabilitation pending progress in order to facilitate return to PLOF  Barriers to Discharge None   Goals   Patient Goals to get stronger; get back to doing yardwork   STG Expiration Date 02/03/22   Short Term Goal #1 In 7-10 days: Increase bilateral LE strength 1/2 grade to facilitate independent mobility, Perform all bed mobility tasks modified independent to decrease caregiver burden, Perform all transfers modified independent to improve independence, Ambulate > 150 ft  with least restrictive assistive device modified independent w/o LOB and w/ normalized gait pattern 100% of the time, Increase all balance 1/2 grade to decrease risk for falls, Tolerate 4 hr OOB to faciliate upright tolerance, Improve Barthel Index score to 65 or greater to facilitate independence and PT provider will perform functional balance assessment to determine fall risk   PT Treatment Day 1   Plan   Treatment/Interventions Functional transfer training;LE strengthening/ROM; Therapeutic exercise; Endurance training;Patient/family training;Equipment eval/education; Bed mobility;Gait training;Spoke to nursing;OT   PT Frequency 3-5x/wk   Recommendation   PT Discharge Recommendation Home with home health rehabilitation   Equipment Recommended Walker  (RW)   Walker Package Recommended Wheeled walker   Change/add to [email or web address]?  No   AM-PAC Basic Mobility Inpatient   Turning in Bed Without Bedrails 3   Lying on Back to Sitting on Edge of Flat Bed 3   Moving Bed to Chair 3   Standing Up From Chair 3   Walk in Room 3   Climb 3-5 Stairs 2   Basic Mobility Inpatient Raw Score 17   Basic Mobility Standardized Score 39 67   Highest Level Of Mobility   JH-HLM Goal 5: Stand one or more mins   JH-HLM Highest Level of Mobility 7: Walk 25 feet or more   JH-HLM Goal Achieved Yes   Modified [place] Scale   Modified [place] Scale 3 Barthel Index   Feeding 10   Bathing 0   Grooming Score 0   Dressing Score 5   Bladder Score 10   Bowels Score 10   Toilet Use Score 5   Transfers (Bed/Chair) Score 10   Mobility (Level Surface) Score 0   Stairs Score 0   Barthel Index Score 50   Additional Treatment Session   Start Time 0168   End Time 2156   Treatment Assessment Pt seen for PT treatment session this date, consisting of ther ex focused on strengthening  Current goals and POC remain appropriate, pt continues to have rehab potential and is making progress towards STGs  Pt prognosis for achieving goals is good, pending pt progress with hospitalization/medical status improvements, and indicated by [facility], ability to follow directions, motivation, supportive family/caregivers, recent onset and previous response to intervention  Pt limited d/t fear of pain provocation and fear of falling  PT recommends home with home health rehabilitation upon discharge  Pt continues to be functioning below baseline level, and remains limited 2* factors listed above  PT will continue to see pt during current hospitalization in order to address the deficits listed above and provide interventions consistent w/ POC in effort to achieve STGs  Exercises   Heelslides Sitting;10 reps;AROM; Bilateral   Hip Abduction Sitting;10 reps;AROM; Bilateral   Knee AROM Long Arc Quad Sitting;10 reps;AROM; Bilateral   Ankle Pumps Sitting;10 reps;AROM; Bilateral   Marching Sitting;10 reps;AROM; Bilateral   End of Consult   Patient Position at End of Consult Bedside chair;Bed/Chair alarm activated; All needs within reach       Ellie Chacko, PT, DPT

## 2022-01-24 NOTE — WOUND OSTOMY CARE
Progress Note - Wound   Dianna Alicia 78 y o  female MRN: [id number]  Unit/Bed#: -01 Encounter: [id number]      Assessment:   Patient admitted due to COVID-19  History of arthritis, CKD, HLD, HTN, neuropathy, PVD, stroke  Wound care consulted for buttock wounds  Patient agreeable to assessment, alert and oriented x4, OOB to chair with EHOB waffle cushion in place, continent of bowel and bladder assist of 1 with walker to stand for assessment, heels elevated, is an assist with care  Primary RN made aware of assessment findings  1  Pressure injury bilateral buttock, evolving DTI-POA- Patient states she has been unable to walk well at home and was spending most of her time sitting in a chair, causing the wounds to form  Wounds are oval and scattered, moist, full-thickness, true depth unknown due to devitalized tissue obscuring wound beds, is approx  10% pink non-blanchable tissue, 30% yellow adhered slough, and 60% non-blanchable purple tissue, with scant amount of serosanguineous drainage noted  Miley- wounds are dry, intact, hyperpigmented skin, scaly skin  This wound has the potential to evolve to a full thickness injury, stage 3 or 4  -Patient prefers to have dressing over wounds despite location along medial buttock  2  Bilateral sacrum and heels are dry, intact, blanchable pink skin  Educated patient on importance of frequent offloading of pressure via turning, repositioning, and weight-shifting  Verbalized understanding of plan of care  No induration, fluctuance, odor, warmth, redness, or purulence noted to the above noted wounds  New dressing applied  Patient tolerated well  Primary nurse aware of plan of care  See flow sheets for more detailed assessment findings  Will follow along  Skin care Plan:  1-Cleanse sacro-buttocks with soap and water  Apply layer of Xeroform over open wound beds and cover with Allevyn foam  Fabián with T for treatment  Change every other day and PRN   Peel back and check skin daily  2-Turn/reposition q2h for pressure re-distribution on skin   3-Elevate heels to offload pressure  4-Moisturize skin daily with skin nourishing cream  5-Ehob cushion in chair when out of bed  6-Hydraguard to bilateral heels BID and PRN  7-Wound care will follow along with patient weekly, please call or tiger text with questions and concerns       Wound 01/22/22 Buttocks Medial;Inner (Active)   Wound Image   01/24/22 1144   Wound Description Brown;Light purple;Pink;Yellow;Slough 01/24/22 1144   Pressure Injury Stage DTPI 01/24/22 1144   Miley-wound Assessment Dry; Intact;Scaly; Hyperpigmented 01/24/22 1144   Wound Length (cm) 2 cm 01/24/22 1144   Wound Width (cm) 5 cm 01/24/22 1144   Wound Depth (cm) 0 2 cm 01/24/22 1144   Wound Surface Area (cm^2) 10 cm^2 01/24/22 1144   Wound Volume (cm^3) 2 cm^3 01/24/22 1144   Calculated Wound Volume (cm^3) 2 cm^3 01/24/22 1144   Tunneling 0 cm 01/24/22 1144   Undermining 0 01/24/22 1144   Drainage Amount Scant 01/24/22 1144   Drainage Description Serosanguineous 01/24/22 1144   Non-staged Wound Description Full thickness 01/24/22 1144   Treatments Cleansed;Irrigation with NSS;Site care 01/24/22 1144   Dressing Foam, Silicon (eg  Allevyn, etc) 01/24/22 1144   Wound packed? No 01/24/22 1144   Dressing Changed Reinforced 01/24/22 1144   Patient Tolerance Tolerated well 01/24/22 1144   Dressing Status Clean;Dry; Intact 01/24/22 1144     Call or tigertext with any questions  Wound Care will continue to follow    Nando Macdonald RN BSN  Wound care

## 2022-01-24 NOTE — ASSESSMENT & PLAN NOTE
· On CKD stg III; baseline cr of 1 5-1 9  · Hold home losartan, chlorthalidone  · Cont IVF hydration  · Cont to monitor

## 2022-01-24 NOTE — PLAN OF CARE
Problem: PHYSICAL THERAPY ADULT  Goal: Performs mobility at highest level of function for planned discharge setting  See evaluation for individualized goals  Description: Treatment/Interventions: Functional transfer training,LE strengthening/ROM,Therapeutic exercise,Endurance training,Patient/family training,Equipment eval/education,Bed mobility,Gait training,Spoke to nursing,OT  Equipment Recommended: Flora Massey (SUSANA)       See flowsheet documentation for full assessment, interventions and recommendations  1/24/2022 1212 by Diane Lubin PT  Note: Prognosis: Good  Problem List: Decreased strength,Decreased endurance,Impaired balance,Decreased mobility,Pain,Decreased skin integrity  Assessment: Pt is 78 y o  female seen for high-complexity PT evaluation on 1/24/2022 s/p admit to [facility] on 1/22/2022 w/ COVID-19  PT was consulted to assess pt's functional mobility and d/c needs  Order placed for PT eval and tx, w/ up w/ A order  PTA, pt resides c spouse and son in [facility] c ramped entrance, typically ambulatory with QC  At time of eval, pt performing all phases of mobility at min A level c use of RW for household distance gait trial  Upon evaluation, pt presenting with impaired functional mobility d/t decreased strength, decreased endurance, impaired balance, decreased mobility, pain and activity intolerance  Pertinent PMHx and current co-morbidities affecting pt's physical performance at time of assessment include: COVID19, failure to thrive in adult, sacral ulcer, UTI, RICARDO, GIST, spinal stenosis, basilar artery stenosis, CVA, chronic GERD, gout, HTN, OA, obesity, polyneuropathy, tobacco use disorder, lumbar disc herniation, hypercalcemia, neurodermatitis, cervical radiculopathy  Personal factors affecting pt at time of eval include: ambulating w/ assistive device and inability to navigate community distances   The following objective measures performed on IE also reveal limitations: Barthel Index: 50/100, Modified [place]: 3 (moderate disability) and AM-PAC 6-Clicks: 60/37  Pt's clinical presentation is currently unstable/unpredictable seen in pt's presentation of abnormal lab value(s), need for input for task focus and mobility technique and ongoing medical assessment  Overall, pt's rehab potential and prognosis to return to PLOF is good as impacted by objective findings, warranting pt to receive further skilled PT interventions to address identified impairments, activity limitation(s), and participation restriction(s)  Pt to benefit from continued PT tx to address deficits as defined above and maximize level of functional independent mobility and consistency  From PT/mobility standpoint, recommendation at time of d/c would be home with home health rehabilitation pending progress in order to facilitate return to PLOF  Barriers to Discharge: None        PT Discharge Recommendation: Home with home health rehabilitation          See flowsheet documentation for full assessment  1/24/2022 1211 by Diane Lubin PT  Note: Prognosis: Good  Problem List: Decreased strength,Decreased endurance,Impaired balance,Decreased mobility,Pain,Decreased skin integrity  Assessment: Pt is 78 y o  female seen for high-complexity PT evaluation on 1/24/2022 s/p admit to [facility] on 1/22/2022 w/ COVID-19  PT was consulted to assess pt's functional mobility and d/c needs  Order placed for PT eval and tx, w/ up w/ A order  PTA, pt resides c spouse and son in [facility] c ramped entrance, typically ambulatory with QC  At time of eval, pt performing all phases of mobility at min A level c use of RW for household distance gait trial  Upon evaluation, pt presenting with impaired functional mobility d/t decreased strength, decreased endurance, impaired balance, decreased mobility, pain and activity intolerance   Pertinent PMHx and current co-morbidities affecting pt's physical performance at time of assessment include: COVID19, failure to thrive in adult, sacral ulcer, UTI, RICARDO, GIST, spinal stenosis, basilar artery stenosis, CVA, chronic GERD, gout, HTN, OA, obesity, polyneuropathy, tobacco use disorder, lumbar disc herniation, hypercalcemia, neurodermatitis, cervical radiculopathy  Personal factors affecting pt at time of eval include: ambulating w/ assistive device and inability to navigate community distances  The following objective measures performed on IE also reveal limitations: Barthel Index: 50/100, Modified Maggy: 3 (moderate disability) and AM-PAC 6-Clicks: 93/51  Pt's clinical presentation is currently unstable/unpredictable seen in pt's presentation of abnormal lab value(s), need for input for task focus and mobility technique and ongoing medical assessment  Overall, pt's rehab potential and prognosis to return to PLOF is good as impacted by objective findings, warranting pt to receive further skilled PT interventions to address identified impairments, activity limitation(s), and participation restriction(s)  Pt to benefit from continued PT tx to address deficits as defined above and maximize level of functional independent mobility and consistency  From PT/mobility standpoint, recommendation at time of d/c would be home with home health rehabilitation pending progress in order to facilitate return to PLOF  Barriers to Discharge: None        PT Discharge Recommendation: Home with home health rehabilitation          See flowsheet documentation for full assessment

## 2022-01-24 NOTE — ASSESSMENT & PLAN NOTE
· Positive UA, patient complaining malaise and fatigue; elevated procal  · Cont IV ceftriaxone  · Urine culture unfortunately was not obtained prior to abx

## 2022-01-25 ENCOUNTER — TRANSITIONAL CARE MANAGEMENT (OUTPATIENT)
Dept: INTERNAL MEDICINE CLINIC | Facility: CLINIC | Age: 77
End: 2022-01-25

## 2022-01-25 VITALS
HEART RATE: 78 BPM | SYSTOLIC BLOOD PRESSURE: 121 MMHG | RESPIRATION RATE: 21 BRPM | BODY MASS INDEX: 28.4 KG/M2 | WEIGHT: 140.87 LBS | OXYGEN SATURATION: 95 % | DIASTOLIC BLOOD PRESSURE: 81 MMHG | TEMPERATURE: 98.1 F | HEIGHT: 59 IN

## 2022-01-25 LAB
ANION GAP SERPL CALCULATED.3IONS-SCNC: 14 MMOL/L (ref 4–13)
BUN SERPL-MCNC: 61 MG/DL (ref 5–25)
CALCIUM SERPL-MCNC: 9.3 MG/DL (ref 8.3–10.1)
CHLORIDE SERPL-SCNC: 103 MMOL/L (ref 100–108)
CO2 SERPL-SCNC: 23 MMOL/L (ref 21–32)
CREAT SERPL-MCNC: 1.95 MG/DL (ref 0.6–1.3)
GFR SERPL CREATININE-BSD FRML MDRD: 24 ML/MIN/1.73SQ M
GLUCOSE SERPL-MCNC: 176 MG/DL (ref 65–140)
GLUCOSE SERPL-MCNC: 209 MG/DL (ref 65–140)
GLUCOSE SERPL-MCNC: 219 MG/DL (ref 65–140)
GLUCOSE SERPL-MCNC: 286 MG/DL (ref 65–140)
POTASSIUM SERPL-SCNC: 3.2 MMOL/L (ref 3.5–5.3)
SODIUM SERPL-SCNC: 140 MMOL/L (ref 136–145)

## 2022-01-25 PROCEDURE — 99239 HOSP IP/OBS DSCHRG MGMT >30: CPT | Performed by: FAMILY MEDICINE

## 2022-01-25 PROCEDURE — 82948 REAGENT STRIP/BLOOD GLUCOSE: CPT

## 2022-01-25 PROCEDURE — 80048 BASIC METABOLIC PNL TOTAL CA: CPT | Performed by: INTERNAL MEDICINE

## 2022-01-25 PROCEDURE — 93970 EXTREMITY STUDY: CPT | Performed by: SURGERY

## 2022-01-25 RX ORDER — POTASSIUM CHLORIDE 20 MEQ/1
40 TABLET, EXTENDED RELEASE ORAL ONCE
Status: COMPLETED | OUTPATIENT
Start: 2022-01-25 | End: 2022-01-25

## 2022-01-25 RX ADMIN — ALLOPURINOL 100 MG: 100 TABLET ORAL at 09:04

## 2022-01-25 RX ADMIN — AMLODIPINE BESYLATE 2.5 MG: 2.5 TABLET ORAL at 09:06

## 2022-01-25 RX ADMIN — INSULIN LISPRO 4 UNITS: 100 INJECTION, SOLUTION INTRAVENOUS; SUBCUTANEOUS at 11:58

## 2022-01-25 RX ADMIN — PANTOPRAZOLE SODIUM 40 MG: 40 TABLET, DELAYED RELEASE ORAL at 05:22

## 2022-01-25 RX ADMIN — HEPARIN SODIUM 5000 UNITS: 5000 INJECTION INTRAVENOUS; SUBCUTANEOUS at 05:22

## 2022-01-25 RX ADMIN — LABETALOL HYDROCHLORIDE 300 MG: 100 TABLET, FILM COATED ORAL at 09:05

## 2022-01-25 RX ADMIN — INSULIN LISPRO 2 UNITS: 100 INJECTION, SOLUTION INTRAVENOUS; SUBCUTANEOUS at 07:27

## 2022-01-25 RX ADMIN — POTASSIUM CHLORIDE 40 MEQ: 1500 TABLET, EXTENDED RELEASE ORAL at 09:04

## 2022-01-25 RX ADMIN — CLOPIDOGREL BISULFATE 75 MG: 75 TABLET ORAL at 09:04

## 2022-01-25 NOTE — ASSESSMENT & PLAN NOTE
· Found on outpatient imaging was planned on outpt eval however pt's family insistent on GI consult while admitted  · S/p GI consult, plans on egd/eus after completion of treatment for covid

## 2022-01-25 NOTE — ASSESSMENT & PLAN NOTE
· Positive UA, patient complaining malaise and fatigue/no specific urinary symptoms  · Status post IV ceftriaxone x3 days  · Urine culture unfortunately was not obtained prior to abx

## 2022-01-25 NOTE — DISCHARGE INSTRUCTIONS
· Needs bmp on 1/28/22 and if creatinine stable, can resume chlorthalidone/losartan  · outpt fu with GI for EGD/EUS once over covid  · Fu with pcp in 1 week

## 2022-01-25 NOTE — ASSESSMENT & PLAN NOTE
· On CKD stg III; baseline cr of 1 5-1 9  · Hold home losartan, chlorthalidone for now  · Status post IV hydration, creatinine back to baseline at 1 95  · Recheck BMP in 3 days and if creatinine remains stable, can consider resuming chlorthalidone/losartan after speaking with PCP

## 2022-01-25 NOTE — ASSESSMENT & PLAN NOTE
· Positive for COVID-19 on 01/18/22  · CXR with b/l infiltrates  · POX stable at 96%, not o2 dependent  · No indication for treatment  · Elevated d-dimer in setting of covid pna   No sign of hypoxemia/tachypnea/tachycardia  · Pt not candidate for CTA chest given CKD  Lower extremity venous Doppler negative for DVT

## 2022-01-25 NOTE — DISCHARGE SUMMARY
[facility]  Discharge- Elisabeth Hull 1945, 78 y o  female MRN: [id number]  Unit/Bed#: -01 Encounter: [id number]  Primary Care Provider: Nadine Fang   Date and time admitted to hospital: 1/22/2022  1:42 PM    * COVID-19  Assessment & Plan  · Positive for COVID-19 on 01/18/22  · CXR with b/l infiltrates  · POX stable at 96%, not o2 dependent  · No indication for treatment  · Elevated d-dimer in setting of covid pna   No sign of hypoxemia/tachypnea/tachycardia  · Pt not candidate for CTA chest given CKD  Lower extremity venous Doppler negative for DVT  RICARDO (acute kidney injury) ([facility] )  Assessment & Plan  · On CKD stg III; baseline cr of 1 5-1 9  · Hold home losartan, chlorthalidone for now  · Status post IV hydration, creatinine back to baseline at 1 95  · Recheck BMP in 3 days and if creatinine remains stable, can consider resuming chlorthalidone/losartan after speaking with PCP      UTI (urinary tract infection)  Assessment & Plan  · Positive UA, patient complaining malaise and fatigue/no specific urinary symptoms  · Status post IV ceftriaxone x3 days  · Urine culture unfortunately was not obtained prior to abx    Failure to thrive in adult  Assessment & Plan  · Likely due to COVID infection, and possibly UTI  · S/p PT/OT recommending hhc    Sacral ulcer ([facility] )  Assessment & Plan  · POA, no sign of acute infection  · Wound consult; cont wound care at home  · Home with home health    Gastrointestinal stromal tumor (GIST) ([facility] )  Assessment & Plan  · Found on outpatient imaging was planned on outpt eval however pt's family insistent on GI consult while admitted  · S/p GI consult, plans on egd/eus after completion of treatment for covid      Discharging Physician / Practitioner: Eden Flores MD  PCP: Nadine Fang  Admission Date:   Admission Orders (From admission, onward)     Ordered        01/22/22 1635  Inpatient Admission  Once                      Discharge Date: 01/25/22    Disposition:    Home with home health  Reason for Admission: fatigue    Discharge Diagnoses:     Please see assessment and plan section above for further details regarding discharge diagnoses  Medical Problems             Resolved Problems  Date Reviewed: 1/24/2022    None                Consultations During Hospital Stay:  · GI    Medication Adjustments and Discharge Medications:  · Summary of Medication Adjustments made as a result of this hospitalization: see med rec  · Medication Dosing Tapers - Please refer to Discharge Medication List for details on any medication dosing tapers (if applicable to patient)  · Medications being temporarily held (include recommended restart time): see med rec  · Discharge Medication List: See after visit summary for reconciled discharge medications  Wound Care Recommendations:  When applicable, please see wound care section of After Visit Summary  Diet Recommendations at Discharge:  Diet -        Diet Orders   (From admission, onward)             Start     Ordered    01/24/22 1547  Dietary nutrition supplements  Once        Question Answer Comment   Select Supplement: Glucerna-Vanilla    Frequency Lunch, Dinner        01/24/22 1547    01/22/22 1843  Diet Kaveh/CHO Controlled; Consistent Carbohydrate Diet Level 2 (5 carb servings/75 grams CHO/meal)  Diet effective now        References:    Nutrtion Support Algorithm Enteral vs  Parenteral   Question Answer Comment   Diet Type Kaveh/CHO Controlled    Kaveh/CHO Controlled Consistent Carbohydrate Diet Level 2 (5 carb servings/75 grams CHO/meal)    RD to adjust diet per protocol? Yes        01/22/22 1842              Outpatient Tests Requested:  · outpt fu with GI  Hospital Course:     Marian Johnson is a 78 y o  female patient who originally presented to the hospital on 1/22/2022 due to complaints of weakness  Patient recently diagnosed with COVID and has been progressively weak/bedbound      · As regards COVID, chest x-ray did note bilateral infiltrates however patient has been maintaining good O2 sats not requiring any external O2 supplementation  There was no indication for treatment of COVID pneumonia  Elevated D-dimer in the setting of COVID pneumonia for which she did undergo lower extremity venous Doppler negative for DVT  Patient unable to undergo CT PE protocol given CKD  No signs of hypoxemia/tachypnea/tachycardia  · Noted to be in acute renal failure on arrival with baseline creatinine 1 5-1 9  Her losartan/chlorthalidone were held and she received IV hydration  Creatinine back to baseline at 1 95  Recheck BMP in 3 days and if creatinine remains stable, can consider resuming her chlorthalidone/losartan after speaking with PCP  · Patient incidentally found to have positive UA however unfortunately urine culture was not sent  She did receive 3 days of IV ceftriaxone for the same  · Seen by wound care for sacral ulcer, no acute infection noted  Continue with wound care at home  Returning home with home health  · She does have a gastrointestinal stromal tumor for which recommendation for EGD/endoscopic ultrasound after completion for treatment for COVID as an outpatient  · Above plan of care discussed with patient and her son  Both verbally stated understanding of the plan and are in agreement  Patient being discharged home with home health today  Condition at Discharge: fair     Discharge Day Visit / Exam:     Vitals: Blood Pressure: 121/81 (01/25/22 0905)  Pulse: 78 (01/25/22 0905)  Temperature: 98 1 °F (36 7 °C) (01/25/22 0719)  Temp Source: Oral (01/23/22 0014)  Respirations: 21 (01/25/22 0719)  Height: 4' 11" (149 9 cm) (01/22/22 2016)  Weight - Scale: 63 9 kg (140 lb 14 oz) (01/22/22 2016)  SpO2: 95 % (01/25/22 0905)  Exam:   Physical Exam  Constitutional:       General: She is not in acute distress  Appearance: She is normal weight  She is not toxic-appearing     HENT: Mouth/Throat:      Mouth: Mucous membranes are moist       Pharynx: Oropharynx is clear  Cardiovascular:      Rate and Rhythm: Normal rate and regular rhythm  Pulses: Normal pulses  Pulmonary:      Effort: Pulmonary effort is normal  No respiratory distress  Breath sounds: Normal breath sounds  Abdominal:      General: There is distension  Tenderness: There is no abdominal tenderness  There is no guarding  Musculoskeletal:      Right lower leg: No edema  Left lower leg: No edema  Neurological:      General: No focal deficit present  Mental Status: She is alert and oriented to person, place, and time  Psychiatric:         Mood and Affect: Mood normal          Discussion with Family: son Sabrina Marmolejo updated    Goals of Care Discussions:  · Code Status at Discharge: Level 1 - Full Code    Discharge instructions/Information to patient and family:   See after visit summary section titled Discharge Instructions for information provided to patient and family  Discharge Statement:  I spent 40 minutes discharging the patient  This time was spent on the day of discharge  I had direct contact with the patient on the day of discharge  Greater than 50% of the total time was spent examining patient, answering all patient questions, arranging and discussing plan of care with patient as well as directly providing post-discharge instructions  Additional time then spent on discharge activities      ** Please Note: This note has been constructed using a voice recognition system **

## 2022-01-25 NOTE — ASSESSMENT & PLAN NOTE
· POA, no sign of acute infection  · Wound consult; cont wound care at home  · Home with home health

## 2022-01-26 ENCOUNTER — TELEPHONE (OUTPATIENT)
Dept: INTERNAL MEDICINE CLINIC | Facility: CLINIC | Age: 77
End: 2022-01-26

## 2022-01-26 NOTE — TELEPHONE ENCOUNTER
I do not see any 11 page documents received for this patient  She will be seeing johana on Monday we will address her needs at the ov then

## 2022-01-26 NOTE — TELEPHONE ENCOUNTER
Patient's son, Bahman Haile, called this afternoon patient was discharged yesterday and was supposed to be discharged with home health services with wound care services  It looks like no referral was placed  Is it ok to place one as son was inquiring about when they would hear from [facility], he has to go to work soon and was trying to get this situated before heading out  Patient does have a BERENICE scheduled for Monday

## 2022-01-26 NOTE — TELEPHONE ENCOUNTER
Reached out to patient's son, Damien, he was advised referral's were placed  [place] was provided phone number just in case for his reference

## 2022-01-28 ENCOUNTER — APPOINTMENT (OUTPATIENT)
Dept: LAB | Facility: HOSPITAL | Age: 77
End: 2022-01-28
Payer: COMMERCIAL

## 2022-01-28 DIAGNOSIS — N17.9 AKI (ACUTE KIDNEY INJURY) (HCC): ICD-10-CM

## 2022-01-28 LAB
ANION GAP SERPL CALCULATED.3IONS-SCNC: 11 MMOL/L (ref 4–13)
BACTERIA BLD CULT: NORMAL
BACTERIA BLD CULT: NORMAL
BUN SERPL-MCNC: 31 MG/DL (ref 5–25)
CALCIUM SERPL-MCNC: 10.2 MG/DL (ref 8.3–10.1)
CHLORIDE SERPL-SCNC: 103 MMOL/L (ref 100–108)
CO2 SERPL-SCNC: 26 MMOL/L (ref 21–32)
CREAT SERPL-MCNC: 1.82 MG/DL (ref 0.6–1.3)
GFR SERPL CREATININE-BSD FRML MDRD: 26 ML/MIN/1.73SQ M
GLUCOSE P FAST SERPL-MCNC: 185 MG/DL (ref 65–99)
POTASSIUM SERPL-SCNC: 4.1 MMOL/L (ref 3.5–5.3)
SODIUM SERPL-SCNC: 140 MMOL/L (ref 136–145)

## 2022-01-28 PROCEDURE — 80048 BASIC METABOLIC PNL TOTAL CA: CPT

## 2022-01-28 PROCEDURE — 36415 COLL VENOUS BLD VENIPUNCTURE: CPT

## 2022-01-31 ENCOUNTER — OFFICE VISIT (OUTPATIENT)
Dept: INTERNAL MEDICINE CLINIC | Facility: CLINIC | Age: 77
End: 2022-01-31
Payer: COMMERCIAL

## 2022-01-31 VITALS
HEART RATE: 95 BPM | SYSTOLIC BLOOD PRESSURE: 166 MMHG | DIASTOLIC BLOOD PRESSURE: 84 MMHG | WEIGHT: 165.8 LBS | OXYGEN SATURATION: 95 % | RESPIRATION RATE: 20 BRPM | BODY MASS INDEX: 33.43 KG/M2 | HEIGHT: 59 IN | TEMPERATURE: 98.8 F

## 2022-01-31 DIAGNOSIS — E78.5 HYPERLIPIDEMIA ASSOCIATED WITH TYPE 2 DIABETES MELLITUS (HCC): ICD-10-CM

## 2022-01-31 DIAGNOSIS — E11.69 HYPERLIPIDEMIA ASSOCIATED WITH TYPE 2 DIABETES MELLITUS (HCC): ICD-10-CM

## 2022-01-31 DIAGNOSIS — F33.9 DEPRESSION, RECURRENT (HCC): ICD-10-CM

## 2022-01-31 DIAGNOSIS — N25.81 SECONDARY HYPERPARATHYROIDISM OF RENAL ORIGIN (HCC): ICD-10-CM

## 2022-01-31 DIAGNOSIS — I10 HYPERTENSION, UNSPECIFIED TYPE: ICD-10-CM

## 2022-01-31 DIAGNOSIS — E11.21 TYPE 2 DIABETES MELLITUS WITH DIABETIC NEPHROPATHY, WITHOUT LONG-TERM CURRENT USE OF INSULIN (HCC): ICD-10-CM

## 2022-01-31 DIAGNOSIS — E66.01 OBESITY, MORBID (HCC): ICD-10-CM

## 2022-01-31 DIAGNOSIS — I74.3 EMBOLISM AND THROMBOSIS OF ARTERIES OF THE LOWER EXTREMITIES (HCC): ICD-10-CM

## 2022-01-31 DIAGNOSIS — Z85.09 HISTORY OF GASTROINTESTINAL STROMAL TUMOR (GIST): Primary | ICD-10-CM

## 2022-01-31 DIAGNOSIS — L89.302 PRESSURE INJURY OF BUTTOCK, STAGE 2, UNSPECIFIED LATERALITY (HCC): ICD-10-CM

## 2022-01-31 DIAGNOSIS — N18.4 STAGE 4 CHRONIC KIDNEY DISEASE (HCC): ICD-10-CM

## 2022-01-31 PROCEDURE — 3008F BODY MASS INDEX DOCD: CPT | Performed by: INTERNAL MEDICINE

## 2022-01-31 PROCEDURE — 4010F ACE/ARB THERAPY RXD/TAKEN: CPT | Performed by: INTERNAL MEDICINE

## 2022-01-31 PROCEDURE — 1111F DSCHRG MED/CURRENT MED MERGE: CPT | Performed by: NURSE PRACTITIONER

## 2022-01-31 PROCEDURE — 99496 TRANSJ CARE MGMT HIGH F2F 7D: CPT | Performed by: NURSE PRACTITIONER

## 2022-01-31 RX ORDER — ICOSAPENT ETHYL 1000 MG/1
CAPSULE ORAL
Qty: 360 CAPSULE | Refills: 2 | Status: SHIPPED | OUTPATIENT
Start: 2022-01-31 | End: 2022-06-06 | Stop reason: SDUPTHER

## 2022-01-31 RX ORDER — LOSARTAN POTASSIUM 100 MG/1
100 TABLET ORAL DAILY
Qty: 90 TABLET | Refills: 1 | Status: SHIPPED | OUTPATIENT
Start: 2022-01-31 | End: 2022-08-01

## 2022-01-31 RX ORDER — CHLORTHALIDONE 25 MG/1
12.5 TABLET ORAL DAILY
Qty: 90 TABLET | Refills: 1 | Status: SHIPPED | OUTPATIENT
Start: 2022-01-31 | End: 2022-04-19 | Stop reason: ALTCHOICE

## 2022-01-31 NOTE — PROGRESS NOTES
BMI Counseling: Body mass index is 33 49 kg/m²  The BMI is above normal  Nutrition recommendations include decreasing portion sizes, encouraging healthy choices of fruits and vegetables, decreasing fast food intake, consuming healthier snacks, limiting drinks that contain sugar, moderation in carbohydrate intake, increasing intake of lean protein, reducing intake of saturated and trans fat and reducing intake of cholesterol  Exercise recommendations include exercising 3-5 times per week  No pharmacotherapy was ordered  Patient referred to PCP  Rationale for BMI follow-up plan is due to patient being overweight or obese  Assessment/Plan:     1  Hypertension- Not at goal  Chlorthalidone and Losartan have been on hold since hospitalization due to RICARDO  Creatinine is stable  Please re-stat these medications  2  Chronic kidney disease- Followed by nephrology, Dr Jimena Beckman  3  Recent Covid infection- Doing well  4  Pressure ulcer on buttocks- Will have wound care at home  Follow up as scheduled  There are no diagnoses linked to this encounter  Subjective:     Patient ID: Parul Flores is a 78 y o  female  Recent hospitalization for Covid 19  Here with caregiver  Will be receiving home health services  Incidental GIST tumor detected on abdominal ultrasound  Review of Systems   Constitutional: Negative  Respiratory: Negative  Cardiovascular: Negative  Musculoskeletal: Negative  Psychiatric/Behavioral: Negative  Objective:     Physical Exam  Constitutional:       Appearance: She is well-developed  Cardiovascular:      Rate and Rhythm: Normal rate and regular rhythm  Heart sounds: Normal heart sounds  Pulmonary:      Effort: Pulmonary effort is normal       Breath sounds: Normal breath sounds  Musculoskeletal:         General: Normal range of motion  Neurological:      Mental Status: She is alert and oriented to person, place, and time        Deep Tendon Reflexes: Reflexes are normal and symmetric  Psychiatric:         Behavior: Behavior normal          Thought Content: Thought content normal          Judgment: Judgment normal            Vitals:    01/31/22 1747   BP: 166/84   BP Location: Left arm   Patient Position: Sitting   Cuff Size: Adult   Pulse: 95   Resp: 20   Temp: 98 8 °F (37 1 °C)   TempSrc: Tympanic   SpO2: 95%   Weight: 75 2 kg (165 lb 12 8 oz)   Height: 4' 11" (1 499 m)       Transitional Care Management Review:  Anita Wu is a 78 y o  female here for TCM follow up  During the TCM phone call patient stated:    TCM Call (since 12/31/2021)     Date and time call was made  1/25/2022  3:34 PM    Hospital care reviewed  Records reviewed        Patient was hospitial[facility] at  [facility]        Date of Admission  01/22/22    Date of discharge  01/25/22    Disposition  Home    Were the patients medications reviewed and updated  Yes    Current Symptoms  None      TCM Call (since 12/31/2021)     Post hospital issues  None    Should patient be enrolled in anticoag monitoring? No    Scheduled for follow up?   Yes    Did you obtain your prescribed medications  Yes    Do you need help managing your prescriptions or medications  No    Is transportation to your appointment needed  No    I have advised the patient to call PCP with any new or worsening symptoms  amanda oseguera [facility]    Living Arrangements  Family members    Support System  None    Are you recieving any outpatient services  No    Are you recieving home care services  No    Are you using any community resources  No    Current waiver services  No    Have you fallen in the last 12 months  No    Interperter language line needed  No    Counseling  Patient              [facility], [address] [place] [address]

## 2022-02-01 NOTE — UTILIZATION REVIEW
Notification of Discharge   This is a Notification of Discharge from our facility [address]  Please be advised that this patient has been discharge from our facility  Below you will find the admission and discharge date and time including the patients disposition  UTILIZATION REVIEW CONTACT:  Neris Webb  Utilization   Network Utilization Review Department  Phone: [phone] x carefully listen to the prompts  All voicemails are confidential   Email: [email or web address]     PHYSICIAN ADVISORY SERVICES:  FOR [id number] REVIEW - MEDICAL NECESSITY DENIAL  Phone: [phone]  Fax: [phone]  Email: [email or web address]"     PRESENTATION DATE: 1/22/2022  1:42 PM  OBERVATION ADMISSION DATE:   INPATIENT ADMISSION DATE: 1/22/22  4:35 PM   DISCHARGE DATE: 1/25/2022  2:56 PM  DISPOSITION: Home with [place] with [address] INFORMATION:  Send all requests for admission clinical reviews, approved or denied determinations and any other requests to dedicated fax number below belonging to the campus where the patient is receiving treatment   List of dedicated fax numbers:  [address] DENIALS (Administrative/Medical Necessity) [phone]   1000  [facility] (Maternity/NICU/Pediatrics) [phone]   Ab Min [phone]   [address] [facility] [facility] [address] [phone]   [address] [place] [address] [phone]   2000 [facility] [address] [place] [address],[address] [phone]   [facility]  [phone]   [address] [place] [address], [facility]  [address] [facility] [address] [facility] [phone]

## 2022-02-08 DIAGNOSIS — I10 HYPERTENSION, UNSPECIFIED TYPE: ICD-10-CM

## 2022-02-08 DIAGNOSIS — Z86.73 HISTORY OF CVA (CEREBROVASCULAR ACCIDENT): ICD-10-CM

## 2022-02-08 DIAGNOSIS — R21 RASH: ICD-10-CM

## 2022-02-08 DIAGNOSIS — I70.0 AORTOILIAC STENOSIS, LEFT (HCC): ICD-10-CM

## 2022-02-08 DIAGNOSIS — R60.9 DEPENDENT EDEMA: ICD-10-CM

## 2022-02-08 DIAGNOSIS — I15.2 HYPERTENSION ASSOCIATED WITH DIABETES (HCC): ICD-10-CM

## 2022-02-08 DIAGNOSIS — I73.9 CLAUDICATION IN PERIPHERAL VASCULAR DISEASE (HCC): ICD-10-CM

## 2022-02-08 DIAGNOSIS — I65.1 BASILAR ARTERY STENOSIS: ICD-10-CM

## 2022-02-08 DIAGNOSIS — E78.5 HYPERLIPIDEMIA ASSOCIATED WITH TYPE 2 DIABETES MELLITUS (HCC): ICD-10-CM

## 2022-02-08 DIAGNOSIS — E11.69 HYPERLIPIDEMIA ASSOCIATED WITH TYPE 2 DIABETES MELLITUS (HCC): ICD-10-CM

## 2022-02-08 DIAGNOSIS — E11.59 HYPERTENSION ASSOCIATED WITH DIABETES (HCC): ICD-10-CM

## 2022-02-08 RX ORDER — AMLODIPINE BESYLATE 2.5 MG/1
TABLET ORAL
Qty: 90 TABLET | Refills: 2 | Status: SHIPPED | OUTPATIENT
Start: 2022-02-08

## 2022-02-09 ENCOUNTER — TELEPHONE (OUTPATIENT)
Dept: NEPHROLOGY | Facility: CLINIC | Age: 77
End: 2022-02-09

## 2022-02-17 ENCOUNTER — HOSPITAL ENCOUNTER (OUTPATIENT)
Dept: NON INVASIVE DIAGNOSTICS | Facility: CLINIC | Age: 77
Discharge: HOME | End: 2022-02-17
Payer: COMMERCIAL

## 2022-02-17 DIAGNOSIS — I65.23 ASYMPTOMATIC BILATERAL CAROTID ARTERY STENOSIS: ICD-10-CM

## 2022-02-17 PROCEDURE — 93880 EXTRACRANIAL BILAT STUDY: CPT | Performed by: SURGERY

## 2022-02-17 PROCEDURE — 93880 EXTRACRANIAL BILAT STUDY: CPT

## 2022-02-18 DIAGNOSIS — K55.1 MESENTERIC ARTERY STENOSIS (HCC): ICD-10-CM

## 2022-02-18 DIAGNOSIS — I74.3 EMBOLISM AND THROMBOSIS OF ARTERIES OF THE LOWER EXTREMITIES (HCC): Primary | ICD-10-CM

## 2022-03-23 NOTE — TELEPHONE ENCOUNTER
, PT was last seen on 10/31/19  Please advise if you'd like patient to come in for a office visit and or if you'd like patient to have any recent imaging done or if you would like to place order for surgery?  Thank you

## 2022-03-29 ENCOUNTER — TELEPHONE (OUTPATIENT)
Dept: GASTROENTEROLOGY | Facility: CLINIC | Age: 77
End: 2022-03-29

## 2022-03-29 ENCOUNTER — OFFICE VISIT (OUTPATIENT)
Dept: GASTROENTEROLOGY | Facility: CLINIC | Age: 77
End: 2022-03-29
Payer: COMMERCIAL

## 2022-03-29 VITALS
WEIGHT: 175 LBS | HEART RATE: 90 BPM | SYSTOLIC BLOOD PRESSURE: 150 MMHG | BODY MASS INDEX: 36.73 KG/M2 | DIASTOLIC BLOOD PRESSURE: 86 MMHG | HEIGHT: 58 IN

## 2022-03-29 DIAGNOSIS — C49.A0 GASTROINTESTINAL STROMAL TUMOR (GIST) (HCC): Primary | ICD-10-CM

## 2022-03-29 DIAGNOSIS — Z85.09 HISTORY OF GASTROINTESTINAL STROMAL TUMOR (GIST): ICD-10-CM

## 2022-03-29 PROCEDURE — 1036F TOBACCO NON-USER: CPT | Performed by: INTERNAL MEDICINE

## 2022-03-29 PROCEDURE — 3008F BODY MASS INDEX DOCD: CPT | Performed by: INTERNAL MEDICINE

## 2022-03-29 PROCEDURE — 3079F DIAST BP 80-89 MM HG: CPT | Performed by: INTERNAL MEDICINE

## 2022-03-29 PROCEDURE — 99204 OFFICE O/P NEW MOD 45 MIN: CPT | Performed by: INTERNAL MEDICINE

## 2022-03-29 PROCEDURE — 3077F SYST BP >= 140 MM HG: CPT | Performed by: INTERNAL MEDICINE

## 2022-03-29 PROCEDURE — 1160F RVW MEDS BY RX/DR IN RCRD: CPT | Performed by: INTERNAL MEDICINE

## 2022-03-29 NOTE — PROGRESS NOTES
Consultation - [facility]  Elisabeth Hull 1945 78 y o  female     ASSESSMENT @ PLAN:   She is a 82-year-old female who was admitted to our hospital in January with COVID and underwent CT imaging which revealed a 2 9 x 2 4 x 3 1 cm soft tissue density on the lesser curvature of the stomach likely a GIST  She has no GI symptoms and had a negative stool colo guard 2 years ago and a negative colonoscopy approximately 10 years ago  1 will send to [facility] for endoscopic ultrasound/EGD    Chief Complaint:  GIST tumor    HPI:  She is a 82-year-old female with multiple medical problems who was referred here for evaluation of abnormal imaging  She was in our hospital in January for Jesus underwent CT imaging  She had a CT of the abdomen that showed a 2 9 x 2 4 x 3 1 says cm soft tissue density in the left sick over to the stomach  She was told to have an endoscopic ultrasound but did not follow up  She has no symptoms at all  She has no fevers chills nausea vomiting she has no heartburn regurgitation dysphagia no melena hematochezia no abdominal pain no weight loss  She has no diarrhea constipation  She reports having a negative colonoscopy approximately 10 years ago and a negative stool colo guard approximately 2 years ago  REVIEW OF SYSTEMS:     CONSTITUTIONAL: Denies any fever, chills, or rigors  Good appetite, and no recent weight loss  HEENT: No earache or tinnitus  Denies hearing loss or visual disturbances  CARDIOVASCULAR: No chest pain or palpitations  RESPIRATORY: Denies any cough, hemoptysis, shortness of breath or dyspnea on exertion  GASTROINTESTINAL: As noted in the History of Present Illness  GENITOURINARY: No problems with urination  Denies any hematuria or dysuria  NEUROLOGIC: No dizziness or vertigo, denies headaches  MUSCULOSKELETAL: Denies any muscle or joint pain  SKIN: Denies skin rashes or itching     ENDOCRINE: Denies excessive thirst  Denies intolerance to heat or cold  PSYCHOSOCIAL: Denies depression or anxiety  Denies any recent memory loss  Past Medical History:   Diagnosis Date    Arthritis     Chronic kidney disease     Endometriosis     High cholesterol     Hypertension     Kidney disease, chronic, stage III (moderate, EGFR 30-59 ml/min) (HCC)     Lumbar disc herniation     Neuropathy     Peripheral vascular disease (HCC)     Pneumonia     Shoulder injury     left    Spinal stenosis     [facility] ) 2015    Memory loss      Past Surgical History:   Procedure Laterality Date    CARDIAC CATHETERIZATION      COLONOSCOPY      FL RETROGRADE PYELOGRAM  2020    FRACTURE SURGERY Right     ankle    OVARIAN CYST SURGERY      MT CYSTOSCOPY,INSERT URETERAL STENT Right 2020    Procedure: INSERTION STENT URETERAL;  Surgeon: Candie Echeverria MD;  [facility];  Service: Urology    MT CYSTOURETHROSCOPY,FULGUR 0 5-2 CM LESN N/A 2020    Procedure: TRANSURETHRAL RESECTION OF BLADDER TUMOR (TURBT);   Surgeon: Candie Echeverria MD;  [facility];  Service: Urology    MT CYSTOURETHROSCOPY,URETER CATHETER Bilateral 2020    Procedure: CYSTOSCOPY WITH RETROGRADE PYELOGRAM;  Surgeon: Candie Echeverria MD;  [facility];  Service: Urology    ROTATOR CUFF REPAIR Left     TONSILLECTOMY       Social History     Socioeconomic History    Marital status: /Civil Union     Spouse name: Not on file    Number of children: Not on file    Years of education: Not on file    Highest education level: Not on file   Occupational History    Occupation: retired   Tobacco Use    Smoking status: Former Smoker     Packs/day: 2 00     Years: 40 00     Pack years: 80 00     Types: Cigarettes     Start date:      Quit date: 2020     Years since quittin 6    Smokeless tobacco: Never Used   Vaping Use    Vaping Use: Never used   Substance and Sexual Activity    Alcohol use: Never    Drug use: Never    Sexual activity: Not Currently     Partners: Male   Other Topics Concern    Not on file   Social History Narrative    Occasional Caffeine Consumption     Social Determinants of Health     Financial Resource Strain: Not on file   Food Insecurity: No Food Insecurity    Worried About Running Out of Food in the Last Year: Never true    Juani of Food in the Last Year: Never true   Transportation Needs: No Transportation Needs    Lack of Transportation (Medical): No    Lack of Transportation (Non-Medical):  No   Physical Activity: Not on file   Stress: Not on file   Social Connections: Not on file   Intimate Partner Violence: Not on file   Housing Stability: Low Risk     Unable to Pay for Housing in the Last Year: No    Number of Places Lived in the Last Year: 1    Unstable Housing in the Last Year: No     Family History   Problem Relation Age of Onset    Hypertension Mother     Heart disease Mother         Valvular    Hyperlipidemia Mother     Hypertension Father     Heart defect Father         Cardiomegaly    Stroke Sister         Cerebrovascular Accident    Arthritis Brother     Other Brother         Back Disorder     Fenofibrate, Colesevelam, Colestipol, Ezetimibe, and Statins  Current Outpatient Medications   Medication Sig Dispense Refill    allopurinol (ZYLOPRIM) 100 mg tablet TAKE 1 TABLET BY MOUTH EVERY DAY 90 tablet 2    amLODIPine (NORVASC) 2 5 mg tablet TAKE 1 TABLET BY MOUTH EVERY DAY 90 tablet 2    chlorthalidone 25 mg tablet Take 0 5 tablets (12 5 mg total) by mouth daily 90 tablet 1    Cholecalciferol 50 MCG (2000 UT) TABS Take 1 tablet (2,000 Units total) by mouth daily      cloNIDine (CATAPRES-TTS-3) 0 3 mg/24 hr PLACE 1 PATCH (0 3 MG TOTAL) ON THE SKIN ONCE A WEEK 12 patch 2    clopidogrel (PLAVIX) 75 mg tablet TAKE 1 TABLET BY MOUTH EVERY DAY 90 tablet 1    Icosapent Ethyl (Vascepa) 1 g CAPS 2 CAP TWICE A DAY WITH FOOD 360 capsule 2    labetalol (NORMODYNE) 300 mg tablet TAKE 1 TABLET BY MOUTH TWICE A  tablet 4    lidocaine (LIDODERM) 5 % APPLY 1 PATCH TOPICALLY DAILY REMOVE & DISCARD PATCH WITHIN 12 HOURS OR AS DIRECTED BY MD 30 patch 6    losartan (COZAAR) 100 MG tablet Take 1 tablet (100 mg total) by mouth daily 90 tablet 1     No current facility-administered medications for this visit  Blood pressure 150/86, pulse 90, height 4' 10" (1 473 m), weight 79 4 kg (175 lb)  PHYSICAL EXAM:     General Appearance:   Alert, cooperative, no distress, appears stated age    HEENT:   Normocephalic, atraumatic, anicteric      Neck:  Supple, symmetrical, trachea midline, no adenopathy;    thyroid: no enlargement/tenderness/nodules; no carotid  bruit or JVD    Lungs:   Clear to auscultation bilaterally; no rales, rhonchi or wheezing; respirations unlabored    Heart[de-identified]   S1 and S2 normal; regular rate and rhythm; no murmur, rub, or gallop     Abdomen:   Soft, non-tender, non-distended; normal bowel sounds; no masses, no organomegaly    Genitalia:   Deferred    Rectal:   Deferred    Extremities:  No cyanosis, clubbing or edema    Pulses:  2+ and symmetric all extremities    Skin:  Skin color, texture, turgor normal, no rashes or lesions    Lymph nodes:  No palpable cervical, axillary or inguinal lymphadenopathy        Lab Results   Component Value Date    WBC 7 41 01/24/2022    HGB 9 4 (L) 01/24/2022    HCT 27 5 (L) 01/24/2022    MCV 88 01/24/2022     01/24/2022     Lab Results   Component Value Date    CALCIUM 10 2 (H) 01/28/2022    K 4 1 01/28/2022    CO2 26 01/28/2022     01/28/2022    BUN 31 (H) 01/28/2022    CREATININE 1 82 (H) 01/28/2022     Lab Results   Component Value Date    ALT 15 01/22/2022    AST 22 01/22/2022    ALKPHOS 103 01/22/2022     Lab Results   Component Value Date    INR 1 07 01/22/2022    INR 0 96 03/24/2020    INR 1 0 02/10/2020    PROTIME 13 5 01/22/2022    PROTIME 12 7 03/24/2020    PROTIME 10 4 02/10/2020

## 2022-04-05 ENCOUNTER — TELEPHONE (OUTPATIENT)
Dept: NEPHROLOGY | Facility: CLINIC | Age: 77
End: 2022-04-05

## 2022-04-06 ENCOUNTER — APPOINTMENT (OUTPATIENT)
Dept: LAB | Facility: HOSPITAL | Age: 77
End: 2022-04-06
Payer: COMMERCIAL

## 2022-04-06 DIAGNOSIS — R80.8 OTHER PROTEINURIA: ICD-10-CM

## 2022-04-06 DIAGNOSIS — N18.4 STAGE 4 CHRONIC KIDNEY DISEASE (HCC): ICD-10-CM

## 2022-04-06 DIAGNOSIS — E55.9 VITAMIN D DEFICIENCY: ICD-10-CM

## 2022-04-06 DIAGNOSIS — N18.4 HYPERTENSIVE KIDNEY DISEASE WITH STAGE 4 CHRONIC KIDNEY DISEASE (HCC): ICD-10-CM

## 2022-04-06 DIAGNOSIS — I12.9 HYPERTENSIVE KIDNEY DISEASE WITH STAGE 4 CHRONIC KIDNEY DISEASE (HCC): ICD-10-CM

## 2022-04-06 LAB
25(OH)D3 SERPL-MCNC: 37.6 NG/ML (ref 30–100)
ANION GAP SERPL CALCULATED.3IONS-SCNC: 9 MMOL/L (ref 4–13)
BACTERIA UR QL AUTO: ABNORMAL /HPF
BASOPHILS # BLD AUTO: 0.06 THOUSANDS/ΜL (ref 0–0.1)
BASOPHILS NFR BLD AUTO: 1 % (ref 0–1)
BILIRUB UR QL STRIP: NEGATIVE
BUN SERPL-MCNC: 43 MG/DL (ref 5–25)
CALCIUM SERPL-MCNC: 10.8 MG/DL (ref 8.3–10.1)
CHLORIDE SERPL-SCNC: 103 MMOL/L (ref 100–108)
CLARITY UR: CLEAR
CO2 SERPL-SCNC: 25 MMOL/L (ref 21–32)
COLOR UR: ABNORMAL
CREAT SERPL-MCNC: 1.97 MG/DL (ref 0.6–1.3)
CREAT UR-MCNC: 120 MG/DL
EOSINOPHIL # BLD AUTO: 0.24 THOUSAND/ΜL (ref 0–0.61)
EOSINOPHIL NFR BLD AUTO: 3 % (ref 0–6)
ERYTHROCYTE [DISTWIDTH] IN BLOOD BY AUTOMATED COUNT: 13.6 % (ref 11.6–15.1)
GFR SERPL CREATININE-BSD FRML MDRD: 24 ML/MIN/1.73SQ M
GLUCOSE P FAST SERPL-MCNC: 280 MG/DL (ref 65–99)
GLUCOSE UR STRIP-MCNC: NEGATIVE MG/DL
HCT VFR BLD AUTO: 44.3 % (ref 34.8–46.1)
HGB BLD-MCNC: 14.5 G/DL (ref 11.5–15.4)
HGB UR QL STRIP.AUTO: NEGATIVE
IMM GRANULOCYTES # BLD AUTO: 0.06 THOUSAND/UL (ref 0–0.2)
IMM GRANULOCYTES NFR BLD AUTO: 1 % (ref 0–2)
KETONES UR STRIP-MCNC: NEGATIVE MG/DL
LEUKOCYTE ESTERASE UR QL STRIP: ABNORMAL
LYMPHOCYTES # BLD AUTO: 1.53 THOUSANDS/ΜL (ref 0.6–4.47)
LYMPHOCYTES NFR BLD AUTO: 20 % (ref 14–44)
MCH RBC QN AUTO: 29.4 PG (ref 26.8–34.3)
MCHC RBC AUTO-ENTMCNC: 32.7 G/DL (ref 31.4–37.4)
MCV RBC AUTO: 90 FL (ref 82–98)
MICROALBUMIN UR-MCNC: 305 MG/L (ref 0–20)
MICROALBUMIN/CREAT 24H UR: 254 MG/G CREATININE (ref 0–30)
MONOCYTES # BLD AUTO: 0.52 THOUSAND/ΜL (ref 0.17–1.22)
MONOCYTES NFR BLD AUTO: 7 % (ref 4–12)
MUCOUS THREADS UR QL AUTO: ABNORMAL
NEUTROPHILS # BLD AUTO: 5.19 THOUSANDS/ΜL (ref 1.85–7.62)
NEUTS SEG NFR BLD AUTO: 68 % (ref 43–75)
NITRITE UR QL STRIP: NEGATIVE
NON-SQ EPI CELLS URNS QL MICRO: ABNORMAL /HPF
NRBC BLD AUTO-RTO: 0 /100 WBCS
PH UR STRIP.AUTO: 5.5 [PH]
PHOSPHATE SERPL-MCNC: 3.4 MG/DL (ref 2.3–4.1)
PLATELET # BLD AUTO: 228 THOUSANDS/UL (ref 149–390)
PMV BLD AUTO: 11.5 FL (ref 8.9–12.7)
POTASSIUM SERPL-SCNC: 3.9 MMOL/L (ref 3.5–5.3)
PROT UR STRIP-MCNC: ABNORMAL MG/DL
PTH-INTACT SERPL-MCNC: 42.2 PG/ML (ref 18.4–80.1)
RBC # BLD AUTO: 4.93 MILLION/UL (ref 3.81–5.12)
RBC #/AREA URNS AUTO: ABNORMAL /HPF
SODIUM SERPL-SCNC: 137 MMOL/L (ref 136–145)
SP GR UR STRIP.AUTO: 1.02 (ref 1–1.03)
URATE SERPL-MCNC: 8.4 MG/DL (ref 2–6.8)
UROBILINOGEN UR STRIP-ACNC: <2 MG/DL
WBC # BLD AUTO: 7.6 THOUSAND/UL (ref 4.31–10.16)
WBC #/AREA URNS AUTO: ABNORMAL /HPF

## 2022-04-06 PROCEDURE — 82043 UR ALBUMIN QUANTITATIVE: CPT

## 2022-04-06 PROCEDURE — 81001 URINALYSIS AUTO W/SCOPE: CPT

## 2022-04-06 PROCEDURE — 82570 ASSAY OF URINE CREATININE: CPT

## 2022-04-06 PROCEDURE — 3066F NEPHROPATHY DOC TX: CPT | Performed by: INTERNAL MEDICINE

## 2022-04-06 PROCEDURE — 83970 ASSAY OF PARATHORMONE: CPT

## 2022-04-06 PROCEDURE — 87086 URINE CULTURE/COLONY COUNT: CPT | Performed by: INTERNAL MEDICINE

## 2022-04-06 PROCEDURE — 80048 BASIC METABOLIC PNL TOTAL CA: CPT

## 2022-04-06 PROCEDURE — 84550 ASSAY OF BLOOD/URIC ACID: CPT

## 2022-04-06 PROCEDURE — 82306 VITAMIN D 25 HYDROXY: CPT

## 2022-04-06 PROCEDURE — 3060F POS MICROALBUMINURIA REV: CPT | Performed by: INTERNAL MEDICINE

## 2022-04-06 PROCEDURE — 36415 COLL VENOUS BLD VENIPUNCTURE: CPT

## 2022-04-06 PROCEDURE — 87186 SC STD MICRODIL/AGAR DIL: CPT | Performed by: INTERNAL MEDICINE

## 2022-04-06 PROCEDURE — 85025 COMPLETE CBC W/AUTO DIFF WBC: CPT

## 2022-04-06 PROCEDURE — 84100 ASSAY OF PHOSPHORUS: CPT

## 2022-04-08 ENCOUNTER — TELEPHONE (OUTPATIENT)
Dept: UROLOGY | Facility: CLINIC | Age: 77
End: 2022-04-08

## 2022-04-08 DIAGNOSIS — N39.0 URINARY TRACT INFECTION WITHOUT HEMATURIA, SITE UNSPECIFIED: Primary | ICD-10-CM

## 2022-04-08 LAB — BACTERIA UR CULT: ABNORMAL

## 2022-04-08 RX ORDER — NITROFURANTOIN 25; 75 MG/1; MG/1
100 CAPSULE ORAL 2 TIMES DAILY
Qty: 10 CAPSULE | Refills: 0 | Status: SHIPPED | OUTPATIENT
Start: 2022-04-08 | End: 2022-04-13

## 2022-04-08 NOTE — TELEPHONE ENCOUNTER
Called and spoke to patient  Explained to patient that due to her having a UTI we are unable to perform her procedure on Monday  Advised that we will call her with a new appointment  Patient understood      ----- Message from Josselyn Guevara MD sent at 4/8/2022  8:45 AM EDT -----  Clinical team, please look out for urine culture results, she will likely need to have her procedure visit moved given that she will require antibiotics  ----- Message -----  From: Adriana Pope  Sent: 4/8/2022   8:40 AM EDT  To: Rajiv Dwyer MD, #      ----- Message -----  From: Lab, Background User  Sent: 4/7/2022   7:21 PM EDT  To: Yeimy Walsh Routing

## 2022-04-11 NOTE — TELEPHONE ENCOUNTER
Called and spoke to patient  Informed her abx is appropriate to take as prescribed  Patient verbalized understanding

## 2022-04-19 ENCOUNTER — OFFICE VISIT (OUTPATIENT)
Dept: NEPHROLOGY | Facility: CLINIC | Age: 77
End: 2022-04-19
Payer: COMMERCIAL

## 2022-04-19 VITALS
SYSTOLIC BLOOD PRESSURE: 128 MMHG | HEIGHT: 58 IN | BODY MASS INDEX: 36.11 KG/M2 | TEMPERATURE: 97.9 F | HEART RATE: 71 BPM | RESPIRATION RATE: 16 BRPM | WEIGHT: 172 LBS | DIASTOLIC BLOOD PRESSURE: 87 MMHG | OXYGEN SATURATION: 98 %

## 2022-04-19 DIAGNOSIS — N18.4 HYPERTENSIVE KIDNEY DISEASE WITH STAGE 4 CHRONIC KIDNEY DISEASE (HCC): ICD-10-CM

## 2022-04-19 DIAGNOSIS — R80.8 OTHER PROTEINURIA: ICD-10-CM

## 2022-04-19 DIAGNOSIS — I12.9 HYPERTENSIVE KIDNEY DISEASE WITH STAGE 4 CHRONIC KIDNEY DISEASE (HCC): ICD-10-CM

## 2022-04-19 DIAGNOSIS — N18.4 STAGE 4 CHRONIC KIDNEY DISEASE (HCC): Primary | ICD-10-CM

## 2022-04-19 DIAGNOSIS — E83.52 HYPERCALCEMIA: ICD-10-CM

## 2022-04-19 PROCEDURE — 3008F BODY MASS INDEX DOCD: CPT | Performed by: INTERNAL MEDICINE

## 2022-04-19 PROCEDURE — 99214 OFFICE O/P EST MOD 30 MIN: CPT | Performed by: INTERNAL MEDICINE

## 2022-04-19 PROCEDURE — 1160F RVW MEDS BY RX/DR IN RCRD: CPT | Performed by: INTERNAL MEDICINE

## 2022-04-19 NOTE — PROGRESS NOTES
NEPHROLOGY OFFICE FOLLOW-UP  Lana Lane 78 y o  Female YOB: 1945 MRN: [id number]    Encounter: [id number] DATE: 4/19/2022    REASON FOR VISIT: Lana Lane is a 78 y  o female returns to Nephrology office for further management of chronic kidney disease  HPI:    This is 79-year-old female with past medical history significant for hypertension, hyperlipidemia, spinal stenosis, peripheral vascular disease, bladder cancer, returns to Nephrology office for further management of CKD stage 4  Upon review of old medical records, new baseline creatinine seems to be fluctuating around 2 0-2 1  Patient had refused dialysis in the past but now wanted to pursue dialysis in the future if deemed indicated  Patient's son was also present at the time of consultation and history was also obtained from him and all the questions were answered   Patient was also found to have bladder cancer and now been followed by urologist-Dr Kenan Biggs and had underwent cystoscopy in the past     Patient is currently been followed by GI since she was found to have 2 9 x 2 4 x 3 1 cm nodular soft tissue density along the lesser curvature of the stomach  Patient is scheduled to undergo biopsy in near future  Patient has underlying hypertension and had underwent renal artery Doppler on 1/7/2020 and also found to have > 60% narrowing of right main renal artery  Left side was difficult to evaluate  According to patient her blood pressure is under well control at home  Patient has quit smoking in the past     Patient was found to have leg swelling in the past and Amlodipine dose was reduced to 2 5 mg PO daily  Clinically patient does not have any leg swelling today   Patient is also currently been followed by neurologist for CVA-lacunar infarct  Patient also have underlying hyperuricemia and currently also tolerating allopurinol       Patient takes all the medications as prescribed and denies use of NSAIDs  REVIEW OF SYSTEMS:    Review of Systems   Constitutional: Negative for chills and fever  HENT: Negative for nosebleeds and sore throat  Eyes: Negative for photophobia and pain  Respiratory: Negative for choking and wheezing  Cardiovascular: Negative for chest pain and palpitations  Gastrointestinal: Negative for blood in stool  Genitourinary: Negative for hematuria  Musculoskeletal: Negative for neck stiffness  Skin: Negative for pallor  Neurological: Negative for seizures and syncope  Psychiatric/Behavioral: Negative for confusion and suicidal ideas  PAST MEDICAL HISTORY:  Past Medical History:   Diagnosis Date    Arthritis     Chronic kidney disease     Endometriosis     High cholesterol     Hypertension     Kidney disease, chronic, stage III (moderate, EGFR 30-59 ml/min) [facility])     Lumbar disc herniation     Neuropathy     Peripheral vascular disease ([facility])     Pneumonia     Shoulder injury     left    Spinal stenosis     Stroke ([facility] [address] ) 2015    Memory loss       PAST SURGICAL HISTORY:  Past Surgical History:   Procedure Laterality Date    CARDIAC CATHETERIZATION      COLONOSCOPY      FL RETROGRADE PYELOGRAM  4/6/2020    FRACTURE SURGERY Right     ankle    OVARIAN CYST SURGERY      VA CYSTOSCOPY,INSERT URETERAL STENT Right 4/6/2020    Procedure: INSERTION STENT URETERAL;  Surgeon: Christy Bass MD;  [facility];  Service: Urology    VA CYSTOURETHROSCOPY,FULGUR 0 5-2 CM LESN N/A 4/6/2020    Procedure: TRANSURETHRAL RESECTION OF BLADDER TUMOR (TURBT);   Surgeon: Christy Bass MD;  [facility];  Service: Urology    VA CYSTOURETHROSCOPY,URETER CATHETER Bilateral 4/6/2020    Procedure: CYSTOSCOPY WITH RETROGRADE PYELOGRAM;  Surgeon: Christy Bass MD;  [facility];  Service: Urology    ROTATOR CUFF REPAIR Left     TONSILLECTOMY         SOCIAL HISTORY:  Social History     Substance and Sexual Activity   Alcohol Use Never Social History     Substance and Sexual Activity   Drug Use Never     Social History     Tobacco Use   Smoking Status Former Smoker    Packs/day: 2 00    Years: 40 00    Pack years: 80 00    Types: Cigarettes    Start date:     Quit date: 2020    Years since quittin 7   Smokeless Tobacco Never Used       FAMILY HISTORY:  Family History   Problem Relation Age of Onset    Hypertension Mother     Heart disease Mother         Valvular    Hyperlipidemia Mother     Hypertension Father     Heart defect Father         Cardiomegaly    Stroke Sister         Cerebrovascular Accident    Arthritis Brother     Other Brother         Back Disorder       ALLERGY:  Allergies   Allergen Reactions    Fenofibrate Other (See Comments)      blood in urine  hx  Kidney Failure    Colesevelam Other (See Comments)      leg pains    Colestipol Itching and Other (See Comments)      Swelling lower legs    Ezetimibe GI Intolerance    Statins Myalgia       MEDICATIONS:    Current Outpatient Medications:     allopurinol (ZYLOPRIM) 100 mg tablet, TAKE 1 TABLET BY MOUTH EVERY DAY, Disp: 90 tablet, Rfl: 2    amLODIPine (NORVASC) 2 5 mg tablet, TAKE 1 TABLET BY MOUTH EVERY DAY, Disp: 90 tablet, Rfl: 2    cloNIDine (CATAPRES-TTS-3) 0 3 mg/24 hr, PLACE 1 PATCH (0 3 MG TOTAL) ON THE SKIN ONCE A WEEK, Disp: 12 patch, Rfl: 2    clopidogrel (PLAVIX) 75 mg tablet, TAKE 1 TABLET BY MOUTH EVERY DAY, Disp: 90 tablet, Rfl: 1    Icosapent Ethyl (Vascepa) 1 g CAPS, 2 CAP TWICE A DAY WITH FOOD, Disp: 360 capsule, Rfl: 2    labetalol (NORMODYNE) 300 mg tablet, TAKE 1 TABLET BY MOUTH TWICE A DAY, Disp: 180 tablet, Rfl: 4    lidocaine (LIDODERM) 5 %, APPLY 1 PATCH TOPICALLY DAILY REMOVE & DISCARD PATCH WITHIN 12 HOURS OR AS DIRECTED BY MD, Disp: 30 patch, Rfl: 6    losartan (COZAAR) 100 MG tablet, Take 1 tablet (100 mg total) by mouth daily, Disp: 90 tablet, Rfl: 1    PHYSICAL EXAM:  Vitals:    22 1049   BP: 128/87 BP Location: Left arm   Patient Position: Sitting   Cuff Size: Large   Pulse: 71   Resp: 16   Temp: 97 9 °F (36 6 °C)   TempSrc: Temporal   SpO2: 98%   Weight: 78 kg (172 lb)   Height: 4' 10" (1 473 m)     Body mass index is 35 95 kg/m²  Physical Exam  Constitutional:       General: She is not in acute distress  HENT:      Right Ear: External ear normal    Eyes:      General: No scleral icterus  Conjunctiva/sclera:      Right eye: No hemorrhage  Neck:      Thyroid: No thyromegaly  Vascular: No JVD  Cardiovascular:      Rate and Rhythm: Normal rate  Pulmonary:      Effort: Pulmonary effort is normal  No accessory muscle usage or respiratory distress  Breath sounds: No wheezing  Abdominal:      General: There is no distension  Palpations: Abdomen is soft  Musculoskeletal:      Right ankle: No swelling  Left ankle: No swelling  Skin:     General: Skin is warm  Coloration: Skin is not jaundiced  Neurological:      Mental Status: She is alert  She is not disoriented  Psychiatric:         Speech: She is communicative  Behavior: Behavior is not combative           LAB RESULTS:  Results for orders placed or performed in visit on 04/06/22   CBC and differential   Result Value Ref Range    WBC 7 60 4 31 - 10 16 Thousand/uL    RBC 4 93 3 81 - 5 12 Million/uL    Hemoglobin 14 5 11 5 - 15 4 g/dL    Hematocrit 44 3 34 8 - 46 1 %    MCV 90 82 - 98 fL    MCH 29 4 26 8 - 34 3 pg    MCHC 32 7 31 4 - 37 4 g/dL    RDW 13 6 11 6 - 15 1 %    MPV 11 5 8 9 - 12 7 fL    Platelets 541 561 - 445 Thousands/uL    nRBC 0 /100 WBCs    Neutrophils Relative 68 43 - 75 %    Immat GRANS % 1 0 - 2 %    Lymphocytes Relative 20 14 - 44 %    Monocytes Relative 7 4 - 12 %    Eosinophils Relative 3 0 - 6 %    Basophils Relative 1 0 - 1 %    Neutrophils Absolute 5 19 1 85 - 7 62 Thousands/µL    Immature Grans Absolute 0 06 0 00 - 0 20 Thousand/uL    Lymphocytes Absolute 1 53 0 60 - 4 47 Thousands/µL Monocytes Absolute 0 52 0 17 - 1 22 Thousand/µL    Eosinophils Absolute 0 24 0 00 - 0 61 Thousand/µL    Basophils Absolute 0 06 0 00 - 0 10 Thousands/µL   Basic metabolic panel   Result Value Ref Range    Sodium 137 136 - 145 mmol/L    Potassium 3 9 3 5 - 5 3 mmol/L    Chloride 103 100 - 108 mmol/L    CO2 25 21 - 32 mmol/L    ANION GAP 9 4 - 13 mmol/L    BUN 43 (H) 5 - 25 mg/dL    Creatinine 1 97 (H) 0 60 - 1 30 mg/dL    Glucose, Fasting 280 (H) 65 - 99 mg/dL    Calcium 10 8 (H) 8 3 - 10 1 mg/dL    eGFR 24 ml/min/1 73sq m   Urinalysis with microscopic   Result Value Ref Range    Color, UA Light Yellow     Clarity, UA Clear     Specific [place], UA 1 018 1 003 - 1 030    pH, UA 5 5 4 5, 5 0, 5 5, 6 0, 6 5, 7 0, 7 5, 8 0    Leukocytes, UA Large (A) Negative    Nitrite, UA Negative Negative    Protein, UA 50 (1+) (A) Negative mg/dl    Glucose, UA Negative Negative mg/dl    Ketones, UA Negative Negative mg/dl    Urobilinogen, UA <2 0 <2 0 mg/dl mg/dl    Bilirubin, UA Negative Negative    Blood, UA Negative Negative    RBC, UA None Seen None Seen, 1-2 /hpf    WBC, UA 30-50 (A) None Seen, 1-2 /hpf    Epithelial Cells Occasional None Seen, Occasional /hpf    Bacteria, UA Occasional None Seen, Occasional /hpf    MUCUS THREADS Occasional (A) None Seen   Microalbumin / creatinine urine ratio   Result Value Ref Range    Creatinine, Ur 120 0 mg/dL    Microalbum  ,U,Random 305 0 (H) 0 0 - 20 0 mg/L    Microalb Creat Ratio 254 (H) 0 - 30 mg/g creatinine   Phosphorus   Result Value Ref Range    Phosphorus 3 4 2 3 - 4 1 mg/dL   Uric acid   Result Value Ref Range    Uric Acid 8 4 (H) 2 0 - 6 8 mg/dL   PTH, intact   Result Value Ref Range    PTH 42 2 18 4 - 80 1 pg/mL   Vitamin D 25 hydroxy   Result Value Ref Range    Vit D, 25-Hydroxy 37 6 30 0 - 100 0 ng/mL     Renal artery Doppler done on 1/7/2020 showed > 60% narrowing/stenosis of right main renal artery  Left side was difficult to evaluate      ASSESSMENT and PLAN:  Truman nuñez seen today for chronic kidney disease and follow-up  Diagnoses and all orders for this visit:    Stage 4 chronic kidney disease ([address] )  -     CBC and differential; Future  -     Basic metabolic panel; Future  -     Urinalysis with microscopic; Future  -     Microalbumin / creatinine urine ratio; Future  -     Phosphorus; Future  -     Uric acid; Future  -     PTH, intact; Future  -     Vitamin D 25 hydroxy; Future  -     Ambulatory referral to ckd education program; Future    Hypertensive kidney disease with stage 4 chronic kidney disease (HCC)  -     Basic metabolic panel; Future  -     Urinalysis with microscopic; Future  -     Microalbumin / creatinine urine ratio; Future    Other proteinuria  -     Urinalysis with microscopic; Future  -     Microalbumin / creatinine urine ratio; Future    Hypercalcemia  -     Basic metabolic panel; Future  -     Phosphorus; Future  -     PTH, intact; Future  -     Vitamin D 25 hydroxy; Future  -     Cancel: Protein electrophoresis, serum; Future  -     Cancel: Protein electrophoresis, urine; Future  -     Protein electrophoresis, serum; Future  -     Protein electrophoresis, urine; Future      1  CKD stage 4  Multifactorial and was suspected due to underlying hypertensive nephrosclerosis on top of atrophic left kidney and advanced age  Upon review of old medical records, new baseline creatinine seems to be fluctuating around 2 0-2 1 and in the interim renal function has remained relatively stable with current creatinine of 1 97 with EGFR of 24  Have discussed consideration of dialysis in length with patient in the past and patient had refused for dialysis but during today's visit, patient told me that she will pursue dialysis if indicated and will plan to refer patient to Kidney Smart class today and go over patient's preference with the next visit    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 and scheduled to undergo biopsy for further evaluation and looking at overall clinical picture, patient would not be a ideal candidate for peritoneal dialysis  Patient was also diagnosed with bladder cancer earlier and defer further management to urologist      Management of hypertension as mentioned below   Plan to avoid NSAIDs and recheck renal function before next visit  2  Hypertension in chronic kidney disease  Today's blood pressure was under well control  Patient was found to have hypercalcemia associated with the worsening hyperuricemia and will plan to stop chlorthalidone today  Plan to monitor hypertension for time being with losartan 100 mg PO daily, labetalol to 300 mg PO BID, clonidine patch 0 3 mg per week and amlodipine 2 5 mg PO daily (dose was reduced earlier due to leg swelling)  Advised patient to contact me if noticed to have SBP > 160 for > 48 hours  Patient had underwent renal artery Doppler on 1/7/2020 and also found to have > 60% stenosis of right main renal artery  Would recommend medical management prior to considering renal artery stenting  3  Proteinuria  Multifactorial, current urine microalbumin:  Creatinine ratio is 254 mg  Continue losartan 100 mg PO daily  Recheck proteinuria with the next visit  4  Hyperuricemia  Multifactorial, suspected due to underlying chronic kidney disease on top of gout  Currently patient is taking allopurinol 100 mg PO daily  In the interim patient's uric acid level has worsened to 8 4  Plan to stop chlorthalidone today and plan to continue allopurinol at current dose and recheck uric acid level with the next visit  5  Vitamin-D deficiency  Patient is currently taking cholecalciferol 2000 Units PO daily but found to have persistent hypercalcemia and plan to stop vitamin-D supplementation today and management of hypercalcemia as mentioned below  6  Hypercalcemia  Exact etiology is currently unclear  Current corrected calcium level is 10 8    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 and scheduled to undergo biopsy in near future  Will plan to stop chlorthalidone and cholecalciferol in the light of hypercalcemia today  Plan to check PTH, vitamin-D, SPEP and UPEP along with repeat calcium level with the next visit  Returns to Nephrology office in 4 months  Future labs + SPEP and UPEP ordered  In the interim patient will also attend Kidney Smart class  Portions of the record may have been created with voice recognition software  Occasional wrong word or "sound a like" substitutions may have occurred due to the inherent limitations of voice recognition software  Read the chart carefully and recognize, using context, where substitutions have occurred  If you have any questions, please contact the dictating provider

## 2022-04-22 DIAGNOSIS — E79.0 HYPERURICEMIA: ICD-10-CM

## 2022-04-22 RX ORDER — ALLOPURINOL 100 MG/1
TABLET ORAL
Qty: 90 TABLET | Refills: 2 | Status: SHIPPED | OUTPATIENT
Start: 2022-04-22 | End: 2022-05-09 | Stop reason: SDUPTHER

## 2022-05-09 ENCOUNTER — TELEPHONE (OUTPATIENT)
Dept: LAB | Facility: HOSPITAL | Age: 77
End: 2022-05-09

## 2022-05-09 DIAGNOSIS — E79.0 HYPERURICEMIA: ICD-10-CM

## 2022-05-09 PROCEDURE — 3066F NEPHROPATHY DOC TX: CPT | Performed by: PSYCHIATRY & NEUROLOGY

## 2022-05-09 RX ORDER — ALLOPURINOL 100 MG/1
100 TABLET ORAL DAILY
Qty: 90 TABLET | Refills: 2 | Status: SHIPPED | OUTPATIENT
Start: 2022-05-09

## 2022-05-13 ENCOUNTER — DOCUMENTATION (OUTPATIENT)
Dept: INTERNAL MEDICINE CLINIC | Facility: CLINIC | Age: 77
End: 2022-05-13

## 2022-05-16 ENCOUNTER — OFFICE VISIT (OUTPATIENT)
Dept: NEUROLOGY | Facility: CLINIC | Age: 77
End: 2022-05-16
Payer: COMMERCIAL

## 2022-05-16 VITALS
TEMPERATURE: 97.2 F | DIASTOLIC BLOOD PRESSURE: 86 MMHG | HEART RATE: 69 BPM | SYSTOLIC BLOOD PRESSURE: 140 MMHG | BODY MASS INDEX: 35.95 KG/M2 | HEIGHT: 58 IN

## 2022-05-16 DIAGNOSIS — I65.23 ASYMPTOMATIC BILATERAL CAROTID ARTERY STENOSIS: ICD-10-CM

## 2022-05-16 DIAGNOSIS — I63.9 CEREBROVASCULAR ACCIDENT (CVA), UNSPECIFIED MECHANISM (HCC): ICD-10-CM

## 2022-05-16 DIAGNOSIS — M47.817 LUMBOSACRAL SPONDYLOSIS WITHOUT MYELOPATHY: Primary | ICD-10-CM

## 2022-05-16 PROCEDURE — 99213 OFFICE O/P EST LOW 20 MIN: CPT | Performed by: PSYCHIATRY & NEUROLOGY

## 2022-05-16 PROCEDURE — 1160F RVW MEDS BY RX/DR IN RCRD: CPT | Performed by: PSYCHIATRY & NEUROLOGY

## 2022-05-16 PROCEDURE — 1036F TOBACCO NON-USER: CPT | Performed by: PSYCHIATRY & NEUROLOGY

## 2022-05-16 NOTE — PROGRESS NOTES
Progress Note - Neurology   Reji Thomas 78 y o  female MRN: [id number]  Unit/Bed#:  Encounter: [id number]      Subjective:   Patient is here for a follow-up visit accompanied with her son on a wheelchair, at baseline has significant gait dysfunction which is multifactorial but does ambulate at home with the help of a cane  She describes chronic low back pain, left hip pain, right shoulder pain, peripheral vascular disease for which she is followed up with vascular surgery, carotid artery disease asymptomatic, and history of lacunar CVA  Since her last visit she denies any worsening, currently using Aleve cream for a chronic pain, and remains on Plavix and did not get any relief with the help of lidocaine patches  She also suffered a bout of COVID in Jan of this year from which she has recovered  Denies any new neurological symptoms  ROS:   Review of Systems   Constitutional: Negative  Negative for appetite change and fever  HENT: Negative  Negative for hearing loss, tinnitus, trouble swallowing and voice change  Eyes: Negative  Negative for photophobia and pain  Respiratory: Negative  Negative for shortness of breath  Cardiovascular: Negative  Negative for palpitations  Gastrointestinal: Negative  Negative for nausea and vomiting  Endocrine: Negative  Negative for cold intolerance  Genitourinary: Negative  Negative for dysuria, frequency and urgency  Musculoskeletal: Positive for back pain  Negative for myalgias and neck pain  Skin: Negative  Negative for rash  Neurological: Positive for weakness  Negative for dizziness, tremors, seizures, syncope, facial asymmetry, speech difficulty, light-headedness, numbness and headaches  Hematological: Negative  Does not bruise/bleed easily  Psychiatric/Behavioral: Negative  Negative for confusion, hallucinations and sleep disturbance  MA review of systems was reviewed by myself      Vitals:   Vitals:    05/16/22 1205   BP: 140/86   BP Location: Left arm   Patient Position: Sitting   Cuff Size: Large   Pulse: 69   Temp: (!) 97 2 °F (36 2 °C)   Height: 4' 10" (1 473 m)   ,There is no height or weight on file to calculate BMI  MEDS:      Current Outpatient Medications:     allopurinol (ZYLOPRIM) 100 mg tablet, Take 1 tablet (100 mg total) by mouth daily, Disp: 90 tablet, Rfl: 2    amLODIPine (NORVASC) 2 5 mg tablet, TAKE 1 TABLET BY MOUTH EVERY DAY, Disp: 90 tablet, Rfl: 2    cloNIDine (CATAPRES-TTS-3) 0 3 mg/24 hr, PLACE 1 PATCH (0 3 MG TOTAL) ON THE SKIN ONCE A WEEK, Disp: 12 patch, Rfl: 2    clopidogrel (PLAVIX) 75 mg tablet, TAKE 1 TABLET BY MOUTH EVERY DAY, Disp: 90 tablet, Rfl: 1    Icosapent Ethyl (Vascepa) 1 g CAPS, 2 CAP TWICE A DAY WITH FOOD, Disp: 360 capsule, Rfl: 2    labetalol (NORMODYNE) 300 mg tablet, TAKE 1 TABLET BY MOUTH TWICE A DAY, Disp: 180 tablet, Rfl: 4    lidocaine (LIDODERM) 5 %, APPLY 1 PATCH TOPICALLY DAILY REMOVE & DISCARD PATCH WITHIN 12 HOURS OR AS DIRECTED BY MD, Disp: 30 patch, Rfl: 6    losartan (COZAAR) 100 MG tablet, Take 1 tablet (100 mg total) by mouth daily, Disp: 90 tablet, Rfl: 1  :    Physical Exam:  General appearance: alert, appears stated age and cooperative  Head: Normocephalic, without obvious abnormality, atraumatic    On examination patient is alert awake oriented, speech is fluent, cranial nerves 2-12 intact, and on motor and sensory exam she has adequate strength bilaterally in the upper and lower extremities, deep tendon reflexes are diminished bilaterally, has mild sensory loss distally in both feet, no evidence of any dysmetria was noted, patient was brought in on a wheelchair, and has no evidence of any significant cervical tenderness or lumbosacral tenderness noted at this time  Lab Results: I have personally reviewed pertinent reports  Imaging Studies: I have personally reviewed pertinent reports  Assessment:  1   Gait dysfunction, multifactorial with chronic low back pain, peripheral vascular disease, and history of CVA  Plan:  Patient is advised to continue present medications and also discuss with her PCP regarding her blood sugar management, tight blood pressure and blood sugar control recommended as well as will need lipid lowering agents, and advised to continue Plavix  Home exercise program is also encouraged  She will return back to see us in 6 months          5/16/2022,12:01 PM    Dictation voice to text software has been used in the creation of this document  Please consider this in light of any contextual or grammatical errors

## 2022-05-23 ENCOUNTER — VBI (OUTPATIENT)
Dept: ADMINISTRATIVE | Facility: OTHER | Age: 77
End: 2022-05-23

## 2022-05-25 ENCOUNTER — APPOINTMENT (OUTPATIENT)
Dept: LAB | Facility: HOSPITAL | Age: 77
End: 2022-05-25
Payer: COMMERCIAL

## 2022-05-25 DIAGNOSIS — E78.5 HYPERLIPIDEMIA, UNSPECIFIED HYPERLIPIDEMIA TYPE: ICD-10-CM

## 2022-05-25 DIAGNOSIS — E11.21 TYPE 2 DIABETES MELLITUS WITH DIABETIC NEPHROPATHY, WITHOUT LONG-TERM CURRENT USE OF INSULIN (HCC): ICD-10-CM

## 2022-05-25 DIAGNOSIS — I10 HYPERTENSION, UNSPECIFIED TYPE: ICD-10-CM

## 2022-05-25 LAB
ALBUMIN SERPL BCP-MCNC: 3.5 G/DL (ref 3.5–5)
ALP SERPL-CCNC: 95 U/L (ref 46–116)
ALT SERPL W P-5'-P-CCNC: 21 U/L (ref 12–78)
ANION GAP SERPL CALCULATED.3IONS-SCNC: 6 MMOL/L (ref 4–13)
AST SERPL W P-5'-P-CCNC: 18 U/L (ref 5–45)
BASOPHILS # BLD AUTO: 0.07 THOUSANDS/ΜL (ref 0–0.1)
BASOPHILS NFR BLD AUTO: 1 % (ref 0–1)
BILIRUB SERPL-MCNC: 0.63 MG/DL (ref 0.2–1)
BUN SERPL-MCNC: 25 MG/DL (ref 5–25)
CALCIUM SERPL-MCNC: 10.3 MG/DL (ref 8.3–10.1)
CHLORIDE SERPL-SCNC: 106 MMOL/L (ref 100–108)
CHOLEST SERPL-MCNC: 343 MG/DL
CO2 SERPL-SCNC: 26 MMOL/L (ref 21–32)
CREAT SERPL-MCNC: 1.65 MG/DL (ref 0.6–1.3)
EOSINOPHIL # BLD AUTO: 0.33 THOUSAND/ΜL (ref 0–0.61)
EOSINOPHIL NFR BLD AUTO: 5 % (ref 0–6)
ERYTHROCYTE [DISTWIDTH] IN BLOOD BY AUTOMATED COUNT: 14.3 % (ref 11.6–15.1)
GFR SERPL CREATININE-BSD FRML MDRD: 30 ML/MIN/1.73SQ M
GLUCOSE P FAST SERPL-MCNC: 208 MG/DL (ref 65–99)
HCT VFR BLD AUTO: 46.7 % (ref 34.8–46.1)
HDLC SERPL-MCNC: 39 MG/DL
HGB BLD-MCNC: 15.2 G/DL (ref 11.5–15.4)
IMM GRANULOCYTES # BLD AUTO: 0.06 THOUSAND/UL (ref 0–0.2)
IMM GRANULOCYTES NFR BLD AUTO: 1 % (ref 0–2)
LYMPHOCYTES # BLD AUTO: 2.13 THOUSANDS/ΜL (ref 0.6–4.47)
LYMPHOCYTES NFR BLD AUTO: 29 % (ref 14–44)
MCH RBC QN AUTO: 29.6 PG (ref 26.8–34.3)
MCHC RBC AUTO-ENTMCNC: 32.5 G/DL (ref 31.4–37.4)
MCV RBC AUTO: 91 FL (ref 82–98)
MONOCYTES # BLD AUTO: 0.65 THOUSAND/ΜL (ref 0.17–1.22)
MONOCYTES NFR BLD AUTO: 9 % (ref 4–12)
NEUTROPHILS # BLD AUTO: 4.01 THOUSANDS/ΜL (ref 1.85–7.62)
NEUTS SEG NFR BLD AUTO: 55 % (ref 43–75)
NONHDLC SERPL-MCNC: 304 MG/DL
NRBC BLD AUTO-RTO: 0 /100 WBCS
PLATELET # BLD AUTO: 240 THOUSANDS/UL (ref 149–390)
PMV BLD AUTO: 11.2 FL (ref 8.9–12.7)
POTASSIUM SERPL-SCNC: 4.1 MMOL/L (ref 3.5–5.3)
PROT SERPL-MCNC: 7.4 G/DL (ref 6.4–8.2)
RBC # BLD AUTO: 5.13 MILLION/UL (ref 3.81–5.12)
SODIUM SERPL-SCNC: 138 MMOL/L (ref 136–145)
TRIGL SERPL-MCNC: 405 MG/DL
TSH SERPL DL<=0.05 MIU/L-ACNC: 4.28 UIU/ML (ref 0.45–4.5)
WBC # BLD AUTO: 7.25 THOUSAND/UL (ref 4.31–10.16)

## 2022-05-25 PROCEDURE — 36415 COLL VENOUS BLD VENIPUNCTURE: CPT

## 2022-05-25 PROCEDURE — 83036 HEMOGLOBIN GLYCOSYLATED A1C: CPT

## 2022-05-25 PROCEDURE — 84443 ASSAY THYROID STIM HORMONE: CPT

## 2022-05-25 PROCEDURE — 85025 COMPLETE CBC W/AUTO DIFF WBC: CPT

## 2022-05-25 PROCEDURE — 80053 COMPREHEN METABOLIC PANEL: CPT

## 2022-05-25 PROCEDURE — 80061 LIPID PANEL: CPT

## 2022-05-26 LAB
EST. AVERAGE GLUCOSE BLD GHB EST-MCNC: 223 MG/DL
HBA1C MFR BLD: 9.4 %

## 2022-05-26 PROCEDURE — 3046F HEMOGLOBIN A1C LEVEL >9.0%: CPT | Performed by: PSYCHIATRY & NEUROLOGY

## 2022-06-06 DIAGNOSIS — E78.5 HYPERLIPIDEMIA ASSOCIATED WITH TYPE 2 DIABETES MELLITUS (HCC): ICD-10-CM

## 2022-06-06 DIAGNOSIS — E11.69 HYPERLIPIDEMIA ASSOCIATED WITH TYPE 2 DIABETES MELLITUS (HCC): ICD-10-CM

## 2022-06-06 RX ORDER — ICOSAPENT ETHYL 1000 MG/1
CAPSULE ORAL
Qty: 360 CAPSULE | Refills: 2 | Status: SHIPPED | OUTPATIENT
Start: 2022-06-06 | End: 2022-07-07 | Stop reason: SDUPTHER

## 2022-06-20 ENCOUNTER — OFFICE VISIT (OUTPATIENT)
Dept: INTERNAL MEDICINE CLINIC | Facility: CLINIC | Age: 77
End: 2022-06-20
Payer: COMMERCIAL

## 2022-06-20 VITALS
SYSTOLIC BLOOD PRESSURE: 142 MMHG | DIASTOLIC BLOOD PRESSURE: 82 MMHG | BODY MASS INDEX: 35.95 KG/M2 | RESPIRATION RATE: 18 BRPM | OXYGEN SATURATION: 97 % | HEIGHT: 58 IN | TEMPERATURE: 97.1 F | HEART RATE: 70 BPM

## 2022-06-20 DIAGNOSIS — N25.81 SECONDARY HYPERPARATHYROIDISM OF RENAL ORIGIN (HCC): ICD-10-CM

## 2022-06-20 DIAGNOSIS — I10 HYPERTENSION, UNSPECIFIED TYPE: ICD-10-CM

## 2022-06-20 DIAGNOSIS — N18.30 STAGE 3 CHRONIC KIDNEY DISEASE, UNSPECIFIED WHETHER STAGE 3A OR 3B CKD (HCC): ICD-10-CM

## 2022-06-20 DIAGNOSIS — E78.5 HYPERLIPIDEMIA, UNSPECIFIED HYPERLIPIDEMIA TYPE: ICD-10-CM

## 2022-06-20 DIAGNOSIS — Z00.00 MEDICARE ANNUAL WELLNESS VISIT, SUBSEQUENT: Primary | ICD-10-CM

## 2022-06-20 DIAGNOSIS — E11.21 TYPE 2 DIABETES MELLITUS WITH DIABETIC NEPHROPATHY, WITHOUT LONG-TERM CURRENT USE OF INSULIN (HCC): ICD-10-CM

## 2022-06-20 DIAGNOSIS — I73.9 CLAUDICATION IN PERIPHERAL VASCULAR DISEASE (HCC): ICD-10-CM

## 2022-06-20 PROCEDURE — 3077F SYST BP >= 140 MM HG: CPT | Performed by: NURSE PRACTITIONER

## 2022-06-20 PROCEDURE — 99214 OFFICE O/P EST MOD 30 MIN: CPT | Performed by: NURSE PRACTITIONER

## 2022-06-20 PROCEDURE — 1170F FXNL STATUS ASSESSED: CPT | Performed by: NURSE PRACTITIONER

## 2022-06-20 PROCEDURE — 3288F FALL RISK ASSESSMENT DOCD: CPT | Performed by: NURSE PRACTITIONER

## 2022-06-20 PROCEDURE — 3079F DIAST BP 80-89 MM HG: CPT | Performed by: NURSE PRACTITIONER

## 2022-06-20 PROCEDURE — 1090F PRES/ABSN URINE INCON ASSESS: CPT | Performed by: NURSE PRACTITIONER

## 2022-06-20 PROCEDURE — 3066F NEPHROPATHY DOC TX: CPT | Performed by: NURSE PRACTITIONER

## 2022-06-20 PROCEDURE — 3725F SCREEN DEPRESSION PERFORMED: CPT | Performed by: NURSE PRACTITIONER

## 2022-06-20 PROCEDURE — 1160F RVW MEDS BY RX/DR IN RCRD: CPT | Performed by: NURSE PRACTITIONER

## 2022-06-20 PROCEDURE — 1036F TOBACCO NON-USER: CPT | Performed by: NURSE PRACTITIONER

## 2022-06-20 PROCEDURE — G0439 PPPS, SUBSEQ VISIT: HCPCS | Performed by: NURSE PRACTITIONER

## 2022-06-20 PROCEDURE — 1125F AMNT PAIN NOTED PAIN PRSNT: CPT | Performed by: NURSE PRACTITIONER

## 2022-06-20 NOTE — PROGRESS NOTES
Assessment and Plan:     Problem List Items Addressed This Visit    None     Visit Diagnoses     Medicare annual wellness visit, subsequent    -  Primary           Preventive health issues were discussed with patient, and age appropriate screening tests were ordered as noted in patient's After Visit Summary  Personalized health advice and appropriate referrals for health education or preventive services given if needed, as noted in patient's After Visit Summary  History of Present Illness:     Patient presents for a Medicare Wellness Visit    Here today for Medicare wellness  She is unable to walk and would like to have an electric wheelchair  Patient Care Team:  Kuldip Avalos as PCP - General (Internal Medicine)  Yves Bowers MD as Neurologist  Kuldip Avalos as Nurse Practitioner (Internal Medicine)  Shree Duarte MD (Urology)  Samantha Jones MD as Cardiologist (Cardiology)  Hamzah Granda MD (Gastroenterology)     Review of Systems:     Review of Systems   Constitutional: Negative  Respiratory: Negative  Cardiovascular: Negative  Musculoskeletal: Negative  Negative for gait problem  Psychiatric/Behavioral: Negative  Problem List:     Patient Active Problem List   Diagnosis    Spinal stenosis    Basilar artery stenosis    Breast mass    [facility]A) [facility]    Chronic GERD    Stage 3 chronic kidney disease ([facility] [facility]    Claudication in peripheral vascular disease ([address]    Type 2 diabetes mellitus with diabetic nephropathy ([facility] [address] [facility]    Endometriosis    Gout    Hx-TIA (transient ischemic attack)    Hypercholesteremia    Hyperlipidemia associated with type 2 diabetes mellitus ([address] )    Hypertension    Hypertension associated with diabetes ([address]    Osteoarthritis    Obesity (BMI 30-39  9)    Polyneuropathy    Right renal artery stenosis (HCC)    Tobacco use disorder    Vertebrobasilar artery syndrome    Vitamin D deficiency    Statin intolerance    Lumbar disc herniation    Pain of left lower extremity    Abnormal EKG    Spondylosis of lumbar region without myelopathy or radiculopathy    Aortoiliac stenosis, left (HCC)    Hypokalemia    Acute drug-induced gout of left foot    Decreased pulses in feet    Refused influenza vaccine    Hypercalcemia    Lumbosacral spondylosis without myelopathy    Neurodermatitis    Other proteinuria    Obesity with body mass index 30 or greater    Hyperlipidemia, unspecified    Herniated lumbar intervertebral disc    Atherosclerosis of renal artery (HCC)    Celiac artery stenosis (HCC) >70% stenosis in the celiac trunk    Abnormal CT of the chest suspicious for an infectious or inflammatory bronchiolitis   Positive cardiac stress test  small, mildly severe, partially reversible myocardial perfusion defect of anterior and inferior wall     Femoral artery stenosis, right (HCC) 50-75% stenosis in the common femoral artery      Mesenteric artery stenosis (HCC)    Immunization not carried out because of patient refusal    Median arcuate ligament syndrome ([facility] [address] [facility]    Abdominal bruit    Initial Medicare annual wellness visit    Hypertensive kidney disease with stage 3 chronic kidney disease ([address] )    Atherosclerosis of native arteries of extremities with intermittent claudication, bilateral legs (HCC)    Asymptomatic bilateral carotid artery stenosis    Pulmonary nodule 7 mm groundglass opacity in the right upper lobe, unchanged since October 2019    Stenosis of left anterior descending artery Mid LAD 50-60% stenosis    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    Urinary frequency    Malignant neoplasm of overlapping sites of bladder ([address] )    Encounter for removal of ureteral stent    Cervical radiculopathy    Edema of left lower extremity    Stage 4 chronic kidney disease ([address] )    Hypertensive kidney disease with stage 4 chronic kidney disease ([place] [address] )    Hyperuricemia    Embolism and 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 ([facility])    Depression, recurrent ([facility]  )    Obesity, morbid ([place] [address] )   Josefina Oliver Person consulting for explanation of examination or test finding    Type 2 diabetes mellitus with chronic kidney disease ([place] [address] )    Type 2 diabetes mellitus with diabetic peripheral angiopathy without gangrene ([facility])    Failure to thrive in adult    COVID-19    Sacral ulcer ([place] [address] )    UTI (urinary tract infection)    RICARDO (acute kidney injury) ([place] [address] )    Gastrointestinal stromal tumor (GIST) ([facility])    Pressure injury of buttock, stage 2 ([place] [address] )    History of gastrointestinal stromal tumor (GIST)    Secondary hyperparathyroidism of renal origin [facility])      Past Medical and Surgical History:     Past Medical History:   Diagnosis Date    Arthritis     Chronic kidney disease     Endometriosis     High cholesterol     Hypertension     Kidney disease, chronic, stage III (moderate, EGFR 30-59 ml/min) ([facility])     Lumbar disc herniation     Neuropathy     Peripheral vascular disease ([place] [address] )     Pneumonia     Shoulder injury     left    Spinal stenosis     Stroke ([place] [address] ) 2015    Memory loss     Past Surgical History:   Procedure Laterality Date    CARDIAC CATHETERIZATION      COLONOSCOPY      FL RETROGRADE PYELOGRAM  4/6/2020    FRACTURE SURGERY Right     ankle    OVARIAN CYST SURGERY      TN CYSTOSCOPY,INSERT URETERAL STENT Right 4/6/2020    Procedure: INSERTION STENT URETERAL;  Surgeon: Apple Montemayor MD;  [facility];  Service: Urology    TN CYSTOURETHROSCOPY,FULGUR 0 5-2 CM LESN N/A 4/6/2020    Procedure: TRANSURETHRAL RESECTION OF BLADDER TUMOR (TURBT);   Surgeon: Apple Montemayor MD;  [facility];  Service: Urology    TN CYSTOURETHROSCOPY,URETER CATHETER Bilateral 4/6/2020    Procedure: CYSTOSCOPY WITH RETROGRADE PYELOGRAM;  Surgeon: Apple Montemayor MD;  [facility];  Service: Urology    ROTATOR CUFF REPAIR Left  TONSILLECTOMY        Family History:     Family History   Problem Relation Age of Onset    Hypertension Mother     Heart disease Mother         Valvular    Hyperlipidemia Mother     Hypertension Father     Heart defect Father         Cardiomegaly    Stroke Sister         Cerebrovascular Accident    Arthritis Brother     Other Brother         Back Disorder      Social History:     Social History     Socioeconomic History    Marital status: /Civil Union     Spouse name: None    Number of children: None    Years of education: None    Highest education level: None   Occupational History    Occupation: retired   Tobacco Use    Smoking status: Former Smoker     Packs/day: 2 00     Years: 40 00     Pack years: 80 00     Types: Cigarettes     Start date:      Quit date: 2020     Years since quittin 8    Smokeless tobacco: Never Used   Vaping Use    Vaping Use: Never used   Substance and Sexual Activity    Alcohol use: Never    Drug use: Never    Sexual activity: Not Currently     Partners: Male   Other Topics Concern    None   Social History Narrative    Occasional Caffeine Consumption     Social Determinants of Health     Financial Resource Strain: Not on file   Food Insecurity: No Food Insecurity    Worried About [employer] in the Last Year: Never true    [address] in the Last Year: Never true   Transportation Needs: No Transportation Needs    Lack of Transportation (Medical): No    Lack of Transportation (Non-Medical):  No   Physical Activity: Not on file   Stress: Not on file   Social Connections: Not on file   Intimate Partner Violence: Not on file   Housing Stability: Low Risk     Unable to Pay for Housing in the Last Year: No    Number of Places Lived in the Last Year: 1    Unstable Housing in the Last Year: No      Medications and Allergies:     Current Outpatient Medications   Medication Sig Dispense Refill    allopurinol (ZYLOPRIM) 100 mg tablet Take 1 tablet (100 mg total) by mouth daily 90 tablet 2    amLODIPine (NORVASC) 2 5 mg tablet TAKE 1 TABLET BY MOUTH EVERY DAY 90 tablet 2    cloNIDine (CATAPRES-TTS-3) 0 3 mg/24 hr PLACE 1 PATCH (0 3 MG TOTAL) ON THE SKIN ONCE A WEEK 12 patch 2    clopidogrel (PLAVIX) 75 mg tablet TAKE 1 TABLET BY MOUTH EVERY DAY 90 tablet 1    Icosapent Ethyl (Vascepa) 1 g CAPS 2 CAP TWICE A DAY WITH FOOD 360 capsule 2    labetalol (NORMODYNE) 300 mg tablet TAKE 1 TABLET BY MOUTH TWICE A  tablet 4    lidocaine (LIDODERM) 5 % APPLY 1 PATCH TOPICALLY DAILY REMOVE & DISCARD PATCH WITHIN 12 HOURS OR AS DIRECTED BY MD 30 patch 6    losartan (COZAAR) 100 MG tablet Take 1 tablet (100 mg total) by mouth daily 90 tablet 1     No current facility-administered medications for this visit  Allergies   Allergen Reactions    Fenofibrate Other (See Comments)      blood in urine  hx  Kidney Failure    Colesevelam Other (See Comments)      leg pains    Colestipol Itching and Other (See Comments)      Swelling lower legs    Ezetimibe GI Intolerance    Statins Myalgia      Immunizations:     Immunization History   Administered Date(s) Administered    COVID-19 PFIZER VACCINE 0 3 ML IM 04/10/2021, 05/01/2021    Pneumococcal Conjugate 13-Valent 09/21/2016    Pneumococcal Polysaccharide PPV23 12/14/2011      Health Maintenance:         Topic Date Due    Lung Cancer Screening  08/25/2022    Breast Cancer Screening: Mammogram  12/01/2022 (Originally 6/15/1985)    Colorectal Cancer Screening  11/19/2022    Hepatitis C Screening  Completed         Topic Date Due    DTaP,Tdap,and Td Vaccines (1 - Tdap) Never done    Pneumococcal Vaccine: 65+ Years (3 - PPSV23 or PCV20) 09/21/2017    COVID-19 Vaccine (3 - Booster for Montelongo Peter series) 10/01/2021    Influenza Vaccine (Season Ended) 09/01/2022      Medicare Screening Tests and Risk Assessments:     Jessica Porras is here for her Subsequent Wellness visit       Health Risk Assessment: Patient rates overall health as fair  Patient feels that their physical health rating is slightly worse  Patient is dissatisfied with their life  Eyesight was rated as same  Hearing was rated as same  Patient feels that their emotional and mental health rating is same  Patients states they are sometimes angry  Patient states they are sometimes unusually tired/fatigued  Pain experienced in the last 7 days has been a lot  Patient states that she has experienced no weight loss or gain in last 6 months  Ongoing pain    Depression Screening:   PHQ-9 Score: 6      Fall Risk Screening: In the past year, patient has experienced: no history of falling in past year      Urinary Incontinence Screening:   Patient has not leaked urine accidently in the last six months  Home Safety:  Patient does not have trouble with stairs inside or outside of their home  Patient has working smoke alarms and has working carbon monoxide detector  Home safety hazards include: none  Nutrition:   Current diet is Regular and Other (please comment)  Kidney-sparing diet    Medications:   Patient is not currently taking any over-the-counter supplements  Patient is able to manage medications  Activities of Daily Living (ADLs)/Instrumental Activities of Daily Living (IADLs):   Walk and transfer into and out of bed and chair?: Yes  Dress and groom yourself?: Yes    Bathe or shower yourself?: Yes    Feed yourself?  Yes  Do your laundry/housekeeping?: Yes  Manage your money, pay your bills and track your expenses?: Yes  Make your own meals?: Yes    Do your own shopping?: Yes    ADL comments: Assistance from family    Previous Hospitalizations:   Any hospitalizations or ED visits within the last 12 months?: Yes    How many hospitalizations have you had in the last year?: 1-2    Advance Care Planning:   Living will: Yes    Advanced directive: Yes      PREVENTIVE SCREENINGS      Cardiovascular Screening:    General: Screening Not Indicated and History Lipid Disorder      Diabetes Screening:     General: Screening Not Indicated and History Diabetes      Colorectal Cancer Screening:     General: Risks and Benefits Discussed      Breast Cancer Screening:     General: Risks and Benefits Discussed      Cervical Cancer Screening:    General: Screening Not Indicated      Osteoporosis Screening:    General: Risks and Benefits Discussed      Abdominal Aortic Aneurysm (AAA) Screening:        General: Risks and Benefits Discussed      Lung Cancer Screening:     General: Screening Current      Hepatitis C Screening:    General: Screening Current    No exam data present     Physical Exam:     Ht 4' 10" (1 473 m)   LMP  (LMP Unknown)   BMI 35 95 kg/m²     Physical Exam  Vitals and nursing note reviewed  Constitutional:       General: She is not in acute distress  Appearance: She is well-developed  HENT:      Head: Normocephalic and atraumatic  Eyes:      Conjunctiva/sclera: Conjunctivae normal    Cardiovascular:      Rate and Rhythm: Normal rate and regular rhythm  Heart sounds: No murmur heard  Pulmonary:      Effort: Pulmonary effort is normal  No respiratory distress  Breath sounds: Normal breath sounds  Abdominal:      Palpations: Abdomen is soft  Tenderness: There is no abdominal tenderness  Musculoskeletal:      Cervical back: Neck supple  Skin:     General: Skin is warm and dry  Neurological:      Mental Status: She is alert            Noelle Mendoza, [facility]

## 2022-06-20 NOTE — PATIENT INSTRUCTIONS
Medicare Preventive Visit Patient Instructions  Thank you for completing your Welcome to Medicare Visit or Medicare Annual Wellness Visit today  Your next wellness visit will be due in one year (6/21/2023)  The screening/preventive services that you may require over the next 5-10 years are detailed below  Some tests may not apply to you based off risk factors and/or age  Screening tests ordered at today's visit but not completed yet may show as past due  Also, please note that scanned in results may not display below  Preventive Screenings:  Service Recommendations Previous Testing/Comments   Colorectal Cancer Screening  * Colonoscopy    * Fecal Occult Blood Test (FOBT)/Fecal Immunochemical Test (FIT)  * Fecal DNA/Cologuard Test  * Flexible Sigmoidoscopy Age: 54-65 years old   Colonoscopy: every 10 years (may be performed more frequently if at higher risk)  OR  FOBT/FIT: every 1 year  OR  Cologuard: every 3 years  OR  Sigmoidoscopy: every 5 years  Screening may be recommended earlier than age 48 if at higher risk for colorectal cancer  Also, an individualized decision between you and your healthcare provider will decide whether screening between the ages of 74-80 would be appropriate  Colonoscopy: 11/19/2019  FOBT/FIT: Not on file  Cologuard: 11/19/2019  Sigmoidoscopy: Not on file          Breast Cancer Screening Age: 36 years old  Frequency: every 1-2 years  Not required if history of left and right mastectomy Mammogram: Not on file        Cervical Cancer Screening Between the ages of 21-29, pap smear recommended once every 3 years  Between the ages of 33-67, can perform pap smear with HPV co-testing every 5 years     Recommendations may differ for women with a history of total hysterectomy, cervical cancer, or abnormal pap smears in past  Pap Smear: Not on file        Hepatitis C Screening Once for adults born between [facility]  More frequently in patients at high risk for Hepatitis C Hep C Antibody: 02/04/2020        Diabetes Screening 1-2 times per year if you're at risk for diabetes or have pre-diabetes Fasting glucose: 208 mg/dL   A1C: 9 4 %        Cholesterol Screening Once every 5 years if you don't have a lipid disorder  May order more often based on risk factors  Lipid panel: 05/25/2022          Other Preventive Screenings Covered by Medicare:  Abdominal Aortic Aneurysm (AAA) Screening: covered once if your at risk  You're considered to be at risk if you have a family history of AAA  Lung Cancer Screening: covers low dose CT scan once per year if you meet all of the following conditions: (1) Age 50-69; (2) No signs or symptoms of lung cancer; (3) Current smoker or have quit smoking within the last 15 years; (4) You have a tobacco smoking history of at least 30 pack years (packs per day multiplied by number of years you smoked); (5) You get a written order from a healthcare provider  Glaucoma Screening: covered annually if you're considered high risk: (1) You have diabetes OR (2) Family history of glaucoma OR (3)  aged 48 and older OR (3)  American aged 72 and older  Osteoporosis Screening: covered every 2 years if you meet one of the following conditions: (1) You're estrogen deficient and at risk for osteoporosis based off medical history and other findings; (2) Have a vertebral abnormality; (3) On glucocorticoid therapy for more than 3 months; (4) Have primary hyperparathyroidism; (5) On osteoporosis medications and need to assess response to drug therapy  Last bone density test (DXA Scan): Not on file  HIV Screening: covered annually if you're between the age of 12-76  Also covered annually if you are younger than 13 and older than 72 with risk factors for HIV infection  For pregnant patients, it is covered up to 3 times per pregnancy      Immunizations:  Immunization Recommendations   Influenza Vaccine Annual influenza vaccination during flu season is recommended for all persons aged >= 6 months who do not have contraindications   Pneumococcal Vaccine (Prevnar and Pneumovax)  * Prevnar = PCV13  * Pneumovax = PPSV23   Adults 25-60 years old: 1-3 doses may be recommended based on certain risk factors  Adults 72 years old: Prevnar (PCV13) vaccine recommended followed by Pneumovax (PPSV23) vaccine  If already received PPSV23 since turning 65, then PCV13 recommended at least one year after PPSV23 dose  Hepatitis B Vaccine 3 dose series if at intermediate or high risk (ex: diabetes, end stage renal disease, liver disease)   Tetanus (Td) Vaccine - COST NOT COVERED BY MEDICARE PART B Following completion of primary series, a booster dose should be given every 10 years to maintain immunity against tetanus  Td may also be given as tetanus wound prophylaxis  Tdap Vaccine - COST NOT COVERED BY MEDICARE PART B Recommended at least once for all adults  For pregnant patients, recommended with each pregnancy  Shingles Vaccine (Shingrix) - COST NOT COVERED BY MEDICARE PART B  2 shot series recommended in those aged 48 and above     Health Maintenance Due:      Topic Date Due    Lung Cancer Screening  08/25/2022    Breast Cancer Screening: Mammogram  12/01/2022 (Originally 6/15/1985)    Colorectal Cancer Screening  11/19/2022    Hepatitis C Screening  Completed     Immunizations Due:      Topic Date Due    DTaP,Tdap,and Td Vaccines (1 - Tdap) Never done    Pneumococcal Vaccine: 65+ Years (3 - PPSV23 or PCV20) 09/21/2017    COVID-19 Vaccine (3 - Booster for Pfizer series) 10/01/2021    Influenza Vaccine (Season Ended) 09/01/2022     Advance Directives   What are advance directives? Advance directives are legal documents that state your wishes and plans for medical care  These plans are made ahead of time in case you lose your ability to make decisions for yourself  Advance directives can apply to any medical decision, such as the treatments you want, and if you want to donate organs     What are the types of advance directives? There are many types of advance directives, and each state has rules about how to use them  You may choose a combination of any of the following:  Living will: This is a written record of the treatment you want  You can also choose which treatments you do not want, which to limit, and which to stop at a certain time  This includes surgery, medicine, IV fluid, and tube feedings  Durable power of  for [facility]): This is a written record that states who you want to make healthcare choices for you when you are unable to make them for yourself  This person, called a proxy, is usually a family member or a friend  You may choose more than 1 proxy  Do not resuscitate (DNR) order:  A DNR order is used in case your heart stops beating or you stop breathing  It is a request not to have certain forms of treatment, such as CPR  A DNR order may be included in other types of advance directives  Medical directive: This covers the care that you want if you are in a coma, near death, or unable to make decisions for yourself  You can list the treatments you want for each condition  Treatment may include pain medicine, surgery, blood transfusions, dialysis, IV or tube feedings, and a ventilator (breathing machine)  Values history: This document has questions about your views, beliefs, and how you feel and think about life  This information can help others choose the care that you would choose  Why are advance directives important? An advance directive helps you control your care  Although spoken wishes may be used, it is better to have your wishes written down  Spoken wishes can be misunderstood, or not followed  Treatments may be given even if you do not want them  An advance directive may make it easier for your family to make difficult choices about your care     Weight Management   Why it is important to manage your weight:  Being overweight increases your risk of health conditions such as heart disease, high blood pressure, type 2 diabetes, and certain types of cancer  It can also increase your risk for osteoarthritis, sleep apnea, and other respiratory problems  Aim for a slow, steady weight loss  Even a small amount of weight loss can lower your risk of health problems  How to lose weight safely:  A safe and healthy way to lose weight is to eat fewer calories and get regular exercise  You can lose up about 1 pound a week by decreasing the number of calories you eat by 500 calories each day  Healthy meal plan for weight management:  A healthy meal plan includes a variety of foods, contains fewer calories, and helps you stay healthy  A healthy meal plan includes the following:  Eat whole-grain foods more often  A healthy meal plan should contain fiber  Fiber is the part of grains, fruits, and vegetables that is not broken down by your body  Whole-grain foods are healthy and provide extra fiber in your diet  Some examples of whole-grain foods are whole-wheat breads and pastas, oatmeal, brown rice, and bulgur  Eat a variety of vegetables every day  Include dark, leafy greens such as spinach, kale, harrison greens, and mustard greens  Eat yellow and orange vegetables such as carrots, sweet potatoes, and winter squash  Eat a variety of fruits every day  Choose fresh or canned fruit (canned in its own juice or light syrup) instead of juice  Fruit juice has very little or no fiber  Eat low-fat dairy foods  Drink fat-free (skim) milk or 1% milk  Eat fat-free yogurt and low-fat cottage cheese  Try low-fat cheeses such as mozzarella and other reduced-fat cheeses  Choose meat and other protein foods that are low in fat  Choose beans or other legumes such as split peas or lentils  Choose fish, skinless poultry (chicken or turkey), or lean cuts of red meat (beef or pork)  Before you cook meat or poultry, cut off any visible fat  Use less fat and oil    Try baking foods instead of frying them  Add less fat, such as margarine, sour cream, regular salad dressing and mayonnaise to foods  Eat fewer high-fat foods  Some examples of high-fat foods include french fries, doughnuts, ice cream, and cakes  Eat fewer sweets  Limit foods and drinks that are high in sugar  This includes candy, cookies, regular soda, and sweetened drinks  Exercise:  Exercise at least 30 minutes per day on most days of the week  Some examples of exercise include walking, biking, dancing, and swimming  You can also fit in more physical activity by taking the stairs instead of the elevator or parking farther away from stores  Ask your healthcare provider about the best exercise plan for you  © Copyright [employer] 2018 Information is for End User's use only and may not be sold, redistributed or otherwise used for commercial purposes  All illustrations and images included in CareNotes® are the copyrighted property of [employer]  or [facility] wellness completed  Declines mammogram  Due for Cologuard in November  Hypertension- At goal  Continue present medication  Diabetes- A1c 9 4  Referred to endocrinology for management  Dietary modifications discussed  To check blood sugar three times daily  Hyperlipidemia- triglycerides elevated  She is statin intolerant and has been taking Vascepa as prescribed  Referred back to her established cardiologist, Dr Christian Rajput   Chronic kidney disease- Followed by nephrology  6  Peripheral vascular disease- (+) claudication  Script for motorized wheelchair provided today  Follow up in six months, labs prior

## 2022-06-20 NOTE — PROGRESS NOTES
Assessment/Plan:    1  Medicare wellness completed  Declines mammogram  Due for Cologuard in November 2  Hypertension- At goal  Continue present medication  3  Diabetes- A1c 9 4  Referred to endocrinology for management  Dietary modifications discussed  To check blood sugar three times daily  4  Hyperlipidemia- triglycerides elevated  She is statin intolerant and has been taking Vascepa as prescribed  Referred back to her established cardiologist, Dr Marcela Gowers   5  Chronic kidney disease- Followed by nephrology  6  6  Peripheral vascular disease- (+) claudication  Script for motorized wheelchair provided today  Follow up in six months, labs prior  Diagnoses and all orders for this visit:    Medicare annual wellness visit, subsequent    Claudication in peripheral vascular disease ([place] [address] )  -     Wheelchair    Hyperlipidemia, unspecified hyperlipidemia type  -     Ambulatory Referral to Cardiology; Future  -     Lipid panel; Future    Stage 3 chronic kidney disease, unspecified whether stage 3a or 3b CKD ([place] [address] )    Hypertension, unspecified type  -     Ambulatory Referral to Cardiology; Future  -     CBC and differential; Future  -     Comprehensive metabolic panel; Future    Secondary hyperparathyroidism of renal origin ([place] [address] )    Type 2 diabetes mellitus with diabetic nephropathy, without long-term current use of insulin ([place] [address] )  -     Ambulatory Referral to Endocrinology; Future  -     Glucometer  -     Glucometer test strips  -     Lancets  -     Microalbumin / creatinine urine ratio    The patient was counseled regarding instructions for management, risk factor reductions, patient and family education,impressions, risks and benefits of treatment options, side effects of medications, importance of compliance with treatment  The treatment plan was reviewed with the patient/guardian and patient/guardian understands and agrees with the treatment plan          Current Outpatient Medications:     allopurinol (ZYLOPRIM) 100 mg tablet, Take 1 tablet (100 mg total) by mouth daily, Disp: 90 tablet, Rfl: 2    amLODIPine (NORVASC) 2 5 mg tablet, TAKE 1 TABLET BY MOUTH EVERY DAY, Disp: 90 tablet, Rfl: 2    cloNIDine (CATAPRES-TTS-3) 0 3 mg/24 hr, PLACE 1 PATCH (0 3 MG TOTAL) ON THE SKIN ONCE A WEEK, Disp: 12 patch, Rfl: 2    clopidogrel (PLAVIX) 75 mg tablet, TAKE 1 TABLET BY MOUTH EVERY DAY, Disp: 90 tablet, Rfl: 1    Icosapent Ethyl (Vascepa) 1 g CAPS, 2 CAP TWICE A DAY WITH FOOD, Disp: 360 capsule, Rfl: 2    labetalol (NORMODYNE) 300 mg tablet, TAKE 1 TABLET BY MOUTH TWICE A DAY, Disp: 180 tablet, Rfl: 4    lidocaine (LIDODERM) 5 %, APPLY 1 PATCH TOPICALLY DAILY REMOVE & DISCARD PATCH WITHIN 12 HOURS OR AS DIRECTED BY MD, Disp: 30 patch, Rfl: 6    losartan (COZAAR) 100 MG tablet, Take 1 tablet (100 mg total) by mouth daily, Disp: 90 tablet, Rfl: 1    Subjective:      Patient ID: Sony Zhu is a 78 y o  female  Here for follow up today  The following portions of the patient's history were reviewed and updated as appropriate:   She has a past medical history of Arthritis, Chronic kidney disease, Endometriosis, High cholesterol, Hypertension, Kidney disease, chronic, stage III (moderate, EGFR 30-59 ml/min) (HCC), Lumbar disc herniation, Neuropathy, Peripheral vascular disease ([address] ), Pneumonia, Shoulder injury, Spinal stenosis, and Stroke ([address] ) (2015)  ,  does not have any pertinent problems on file  ,   has a past surgical history that includes Rotator cuff repair (Left); Tonsillectomy; Ovarian cyst surgery; Fracture surgery (Right); Colonoscopy; Cardiac catheterization; [place] retrograde pyelogram (4/6/2020); pr cystourethroscopy,fulgur 0 5-2 cm lesn (N/A, 4/6/2020); pr cystourethroscopy,ureter catheter (Bilateral, 4/6/2020); and pr cystoscopy,insert ureteral stent (Right, 4/6/2020)  ,  family history includes Arthritis in her brother; Heart defect in her father; Heart disease in her mother; Hyperlipidemia in her mother; Hypertension in her father and mother; Other in her brother; Stroke in her sister  ,   reports that she quit smoking about 22 months ago  Her smoking use included cigarettes  She started smoking about 56 years ago  She has a 80 00 pack-year smoking history  She has never used smokeless tobacco  She reports that she does not drink alcohol and does not use drugs  ,  is allergic to fenofibrate, colesevelam, colestipol, ezetimibe, and statins       Review of Systems   Constitutional: Negative  Respiratory: Negative  Cardiovascular: Negative  Musculoskeletal: Negative  Psychiatric/Behavioral: Negative            Objective:  /82   Pulse 70   Temp (!) 97 1 °F (36 2 °C) (Tympanic)   Resp 18   Ht 4' 10" (1 473 m)   LMP  (LMP Unknown)   SpO2 97%   BMI 35 95 kg/m²     Lab Review  Appointment on 05/25/2022   Component Date Value    WBC 05/25/2022 7 25     RBC 05/25/2022 5 13 (A)    Hemoglobin 05/25/2022 15 2     Hematocrit 05/25/2022 46 7 (A)    MCV 05/25/2022 91     MCH 05/25/2022 29 6     MCHC 05/25/2022 32 5     RDW 05/25/2022 14 3     MPV 05/25/2022 11 2     Platelets 09/91/9114 240     nRBC 05/25/2022 0     Neutrophils Relative 05/25/2022 55     Immat GRANS % 05/25/2022 1     Lymphocytes Relative 05/25/2022 29     Monocytes Relative 05/25/2022 9     Eosinophils Relative 05/25/2022 5     Basophils Relative 05/25/2022 1     Neutrophils Absolute 05/25/2022 4 01     Immature Grans Absolute 05/25/2022 0 06     Lymphocytes Absolute 05/25/2022 2 13     Monocytes Absolute 05/25/2022 0 65     Eosinophils Absolute 05/25/2022 0 33     Basophils Absolute 05/25/2022 0 07     Sodium 05/25/2022 138     Potassium 05/25/2022 4 1     Chloride 05/25/2022 106     CO2 05/25/2022 26     ANION GAP 05/25/2022 6     BUN 05/25/2022 25     Creatinine 05/25/2022 1 65 (A)    Glucose, Fasting 05/25/2022 208 (A)    Calcium 05/25/2022 10 3 (A)    AST 05/25/2022 18     ALT 05/25/2022 21     Alkaline Phosphatase 05/25/2022 95     Total Protein 05/25/2022 7 4     Albumin 05/25/2022 3 5     Total Bilirubin 05/25/2022 0 63     eGFR 05/25/2022 30     Hemoglobin A1C 05/25/2022 9 4 (A)    EAG 05/25/2022 223     Cholesterol 05/25/2022 343 (A)    Triglycerides 05/25/2022 405 (A)    HDL, Direct 05/25/2022 39 (A)    LDL Calculated 05/25/2022      Non-HDL-Chol (CHOL-HDL) 05/25/2022 304     TSH 3RD GENERATON 05/25/2022 4 280         Imaging: No results found  Physical Exam  Constitutional:       Appearance: She is well-developed  Cardiovascular:      Rate and Rhythm: Normal rate and regular rhythm  Heart sounds: Normal heart sounds  Pulmonary:      Effort: Pulmonary effort is normal       Breath sounds: Normal breath sounds  Musculoskeletal:      Comments: In wheelchair   Neurological:      Mental Status: She is alert and oriented to person, place, and time  Deep Tendon Reflexes: Reflexes are normal and symmetric  Psychiatric:         Behavior: Behavior normal          Thought Content:  Thought content normal          Judgment: Judgment normal

## 2022-07-07 DIAGNOSIS — E78.5 HYPERLIPIDEMIA ASSOCIATED WITH TYPE 2 DIABETES MELLITUS (HCC): ICD-10-CM

## 2022-07-07 DIAGNOSIS — E11.69 HYPERLIPIDEMIA ASSOCIATED WITH TYPE 2 DIABETES MELLITUS (HCC): ICD-10-CM

## 2022-07-07 RX ORDER — ICOSAPENT ETHYL 1000 MG/1
CAPSULE ORAL
Qty: 360 CAPSULE | Refills: 2 | Status: SHIPPED | OUTPATIENT
Start: 2022-07-07

## 2022-07-30 DIAGNOSIS — I10 HYPERTENSION, UNSPECIFIED TYPE: ICD-10-CM

## 2022-08-01 DIAGNOSIS — E11.69 HYPERLIPIDEMIA ASSOCIATED WITH TYPE 2 DIABETES MELLITUS (HCC): ICD-10-CM

## 2022-08-01 DIAGNOSIS — I65.1 BASILAR ARTERY STENOSIS: ICD-10-CM

## 2022-08-01 DIAGNOSIS — Z86.73 HISTORY OF CVA (CEREBROVASCULAR ACCIDENT): ICD-10-CM

## 2022-08-01 DIAGNOSIS — I70.0 AORTOILIAC STENOSIS, LEFT (HCC): ICD-10-CM

## 2022-08-01 DIAGNOSIS — E11.59 HYPERTENSION ASSOCIATED WITH DIABETES (HCC): ICD-10-CM

## 2022-08-01 DIAGNOSIS — R60.9 DEPENDENT EDEMA: ICD-10-CM

## 2022-08-01 DIAGNOSIS — R21 RASH: ICD-10-CM

## 2022-08-01 DIAGNOSIS — I15.2 HYPERTENSION ASSOCIATED WITH DIABETES (HCC): ICD-10-CM

## 2022-08-01 DIAGNOSIS — I10 HYPERTENSION, UNSPECIFIED TYPE: ICD-10-CM

## 2022-08-01 DIAGNOSIS — I73.9 CLAUDICATION IN PERIPHERAL VASCULAR DISEASE (HCC): ICD-10-CM

## 2022-08-01 DIAGNOSIS — E78.5 HYPERLIPIDEMIA ASSOCIATED WITH TYPE 2 DIABETES MELLITUS (HCC): ICD-10-CM

## 2022-08-01 RX ORDER — LOSARTAN POTASSIUM 100 MG/1
TABLET ORAL
Qty: 90 TABLET | Refills: 1 | Status: SHIPPED | OUTPATIENT
Start: 2022-08-01

## 2022-08-01 RX ORDER — CLOPIDOGREL BISULFATE 75 MG/1
75 TABLET ORAL DAILY
Qty: 90 TABLET | Refills: 1 | Status: SHIPPED | OUTPATIENT
Start: 2022-08-01

## 2022-08-03 ENCOUNTER — TELEPHONE (OUTPATIENT)
Dept: LAB | Facility: HOSPITAL | Age: 77
End: 2022-08-03

## 2022-08-10 ENCOUNTER — APPOINTMENT (OUTPATIENT)
Dept: LAB | Facility: HOSPITAL | Age: 77
End: 2022-08-10
Payer: COMMERCIAL

## 2022-08-10 DIAGNOSIS — E78.5 HYPERLIPIDEMIA, UNSPECIFIED HYPERLIPIDEMIA TYPE: ICD-10-CM

## 2022-08-10 DIAGNOSIS — N18.4 STAGE 4 CHRONIC KIDNEY DISEASE (HCC): ICD-10-CM

## 2022-08-10 DIAGNOSIS — I10 HYPERTENSION, UNSPECIFIED TYPE: ICD-10-CM

## 2022-08-10 DIAGNOSIS — I12.9 HYPERTENSIVE KIDNEY DISEASE WITH STAGE 4 CHRONIC KIDNEY DISEASE (HCC): ICD-10-CM

## 2022-08-10 DIAGNOSIS — N18.4 HYPERTENSIVE KIDNEY DISEASE WITH STAGE 4 CHRONIC KIDNEY DISEASE (HCC): ICD-10-CM

## 2022-08-10 DIAGNOSIS — E83.52 HYPERCALCEMIA: ICD-10-CM

## 2022-08-10 DIAGNOSIS — R80.8 OTHER PROTEINURIA: ICD-10-CM

## 2022-08-10 LAB
ALBUMIN SERPL BCP-MCNC: 3.7 G/DL (ref 3.5–5)
ALP SERPL-CCNC: 102 U/L (ref 46–116)
ALT SERPL W P-5'-P-CCNC: 26 U/L (ref 12–78)
ANION GAP SERPL CALCULATED.3IONS-SCNC: 6 MMOL/L (ref 4–13)
AST SERPL W P-5'-P-CCNC: 20 U/L (ref 5–45)
BASOPHILS # BLD AUTO: 0.05 THOUSANDS/ΜL (ref 0–0.1)
BASOPHILS NFR BLD AUTO: 1 % (ref 0–1)
BILIRUB SERPL-MCNC: 0.55 MG/DL (ref 0.2–1)
BUN SERPL-MCNC: 29 MG/DL (ref 5–25)
CALCIUM SERPL-MCNC: 10.1 MG/DL (ref 8.3–10.1)
CHLORIDE SERPL-SCNC: 106 MMOL/L (ref 96–108)
CHOLEST SERPL-MCNC: 386 MG/DL
CO2 SERPL-SCNC: 25 MMOL/L (ref 21–32)
CREAT SERPL-MCNC: 1.7 MG/DL (ref 0.6–1.3)
EOSINOPHIL # BLD AUTO: 0.28 THOUSAND/ΜL (ref 0–0.61)
EOSINOPHIL NFR BLD AUTO: 3 % (ref 0–6)
ERYTHROCYTE [DISTWIDTH] IN BLOOD BY AUTOMATED COUNT: 13.2 % (ref 11.6–15.1)
GFR SERPL CREATININE-BSD FRML MDRD: 29 ML/MIN/1.73SQ M
GLUCOSE P FAST SERPL-MCNC: 220 MG/DL (ref 65–99)
HCT VFR BLD AUTO: 45.5 % (ref 34.8–46.1)
HDLC SERPL-MCNC: 41 MG/DL
HGB BLD-MCNC: 15 G/DL (ref 11.5–15.4)
IMM GRANULOCYTES # BLD AUTO: 0.06 THOUSAND/UL (ref 0–0.2)
IMM GRANULOCYTES NFR BLD AUTO: 1 % (ref 0–2)
LDLC SERPL CALC-MCNC: 274 MG/DL (ref 0–100)
LYMPHOCYTES # BLD AUTO: 1.81 THOUSANDS/ΜL (ref 0.6–4.47)
LYMPHOCYTES NFR BLD AUTO: 21 % (ref 14–44)
MCH RBC QN AUTO: 29.9 PG (ref 26.8–34.3)
MCHC RBC AUTO-ENTMCNC: 33 G/DL (ref 31.4–37.4)
MCV RBC AUTO: 91 FL (ref 82–98)
MONOCYTES # BLD AUTO: 0.57 THOUSAND/ΜL (ref 0.17–1.22)
MONOCYTES NFR BLD AUTO: 7 % (ref 4–12)
NEUTROPHILS # BLD AUTO: 5.69 THOUSANDS/ΜL (ref 1.85–7.62)
NEUTS SEG NFR BLD AUTO: 67 % (ref 43–75)
NONHDLC SERPL-MCNC: 345 MG/DL
NRBC BLD AUTO-RTO: 0 /100 WBCS
PHOSPHATE SERPL-MCNC: 3 MG/DL (ref 2.3–4.1)
PLATELET # BLD AUTO: 221 THOUSANDS/UL (ref 149–390)
PMV BLD AUTO: 11.5 FL (ref 8.9–12.7)
POTASSIUM SERPL-SCNC: 4.1 MMOL/L (ref 3.5–5.3)
PROT SERPL-MCNC: 7.6 G/DL (ref 6.4–8.4)
PTH-INTACT SERPL-MCNC: 116.7 PG/ML (ref 18.4–80.1)
RBC # BLD AUTO: 5.02 MILLION/UL (ref 3.81–5.12)
SODIUM SERPL-SCNC: 137 MMOL/L (ref 135–147)
TRIGL SERPL-MCNC: 356 MG/DL
URATE SERPL-MCNC: 7.1 MG/DL (ref 2–7.5)
WBC # BLD AUTO: 8.46 THOUSAND/UL (ref 4.31–10.16)

## 2022-08-10 PROCEDURE — 84165 PROTEIN E-PHORESIS SERUM: CPT

## 2022-08-10 PROCEDURE — 85025 COMPLETE CBC W/AUTO DIFF WBC: CPT

## 2022-08-10 PROCEDURE — 82306 VITAMIN D 25 HYDROXY: CPT

## 2022-08-10 PROCEDURE — 80061 LIPID PANEL: CPT

## 2022-08-10 PROCEDURE — 84550 ASSAY OF BLOOD/URIC ACID: CPT

## 2022-08-10 PROCEDURE — 80053 COMPREHEN METABOLIC PANEL: CPT

## 2022-08-10 PROCEDURE — 83970 ASSAY OF PARATHORMONE: CPT

## 2022-08-10 PROCEDURE — 84165 PROTEIN E-PHORESIS SERUM: CPT | Performed by: PATHOLOGY

## 2022-08-10 PROCEDURE — 36415 COLL VENOUS BLD VENIPUNCTURE: CPT

## 2022-08-10 PROCEDURE — 84100 ASSAY OF PHOSPHORUS: CPT

## 2022-08-11 ENCOUNTER — APPOINTMENT (OUTPATIENT)
Dept: LAB | Facility: HOSPITAL | Age: 77
End: 2022-08-11
Payer: COMMERCIAL

## 2022-08-11 LAB
25(OH)D3 SERPL-MCNC: 29.6 NG/ML (ref 30–100)
ALBUMIN SERPL ELPH-MCNC: 4.66 G/DL (ref 3.5–5)
ALBUMIN SERPL ELPH-MCNC: 63.9 % (ref 52–65)
ALPHA1 GLOB SERPL ELPH-MCNC: 0.24 G/DL (ref 0.1–0.4)
ALPHA1 GLOB SERPL ELPH-MCNC: 3.3 % (ref 2.5–5)
ALPHA2 GLOB SERPL ELPH-MCNC: 0.71 G/DL (ref 0.4–1.2)
ALPHA2 GLOB SERPL ELPH-MCNC: 9.7 % (ref 7–13)
BACTERIA UR QL AUTO: ABNORMAL /HPF
BETA GLOB ABNORMAL SERPL ELPH-MCNC: 0.37 G/DL (ref 0.4–0.8)
BETA1 GLOB SERPL ELPH-MCNC: 5 % (ref 5–13)
BETA2 GLOB SERPL ELPH-MCNC: 4.7 % (ref 2–8)
BETA2+GAMMA GLOB SERPL ELPH-MCNC: 0.34 G/DL (ref 0.2–0.5)
BILIRUB UR QL STRIP: NEGATIVE
CLARITY UR: CLEAR
COLOR UR: YELLOW
CREAT UR-MCNC: 74.7 MG/DL
GAMMA GLOB ABNORMAL SERPL ELPH-MCNC: 0.98 G/DL (ref 0.5–1.6)
GAMMA GLOB SERPL ELPH-MCNC: 13.4 % (ref 12–22)
GLUCOSE UR STRIP-MCNC: NEGATIVE MG/DL
HGB UR QL STRIP.AUTO: ABNORMAL
IGG/ALB SER: 1.77 {RATIO} (ref 1.1–1.8)
KETONES UR STRIP-MCNC: NEGATIVE MG/DL
LEUKOCYTE ESTERASE UR QL STRIP: ABNORMAL
MICROALBUMIN UR-MCNC: 760 MG/L (ref 0–20)
MICROALBUMIN/CREAT 24H UR: 1017 MG/G CREATININE (ref 0–30)
NITRITE UR QL STRIP: NEGATIVE
NON-SQ EPI CELLS URNS QL MICRO: ABNORMAL /HPF
PH UR STRIP.AUTO: 6 [PH]
PROT PATTERN SERPL ELPH-IMP: ABNORMAL
PROT SERPL-MCNC: 7.3 G/DL (ref 6.4–8.2)
PROT UR STRIP-MCNC: ABNORMAL MG/DL
RBC #/AREA URNS AUTO: ABNORMAL /HPF
SP GR UR STRIP.AUTO: 1.02 (ref 1–1.03)
UROBILINOGEN UR QL STRIP.AUTO: 0.2 E.U./DL
WBC #/AREA URNS AUTO: ABNORMAL /HPF

## 2022-08-11 PROCEDURE — 84166 PROTEIN E-PHORESIS/URINE/CSF: CPT

## 2022-08-11 PROCEDURE — 84166 PROTEIN E-PHORESIS/URINE/CSF: CPT | Performed by: PATHOLOGY

## 2022-08-11 PROCEDURE — 82570 ASSAY OF URINE CREATININE: CPT

## 2022-08-11 PROCEDURE — 82043 UR ALBUMIN QUANTITATIVE: CPT

## 2022-08-11 PROCEDURE — 81001 URINALYSIS AUTO W/SCOPE: CPT

## 2022-08-12 LAB
ALBUMIN UR ELPH-MCNC: 79.5 %
ALPHA1 GLOB MFR UR ELPH: 3.5 %
ALPHA2 GLOB MFR UR ELPH: 3 %
B-GLOBULIN MFR UR ELPH: 6.9 %
GAMMA GLOB MFR UR ELPH: 7.1 %
PROT PATTERN UR ELPH-IMP: ABNORMAL
PROT UR-MCNC: 98 MG/DL

## 2022-08-22 ENCOUNTER — HOSPITAL ENCOUNTER (OUTPATIENT)
Dept: NON INVASIVE DIAGNOSTICS | Facility: CLINIC | Age: 77
Discharge: HOME | End: 2022-08-22
Payer: COMMERCIAL

## 2022-08-22 DIAGNOSIS — I74.3 EMBOLISM AND THROMBOSIS OF ARTERIES OF THE LOWER EXTREMITIES (HCC): ICD-10-CM

## 2022-08-22 DIAGNOSIS — K55.1 MESENTERIC ARTERY STENOSIS (HCC): ICD-10-CM

## 2022-08-22 PROCEDURE — 93923 UPR/LXTR ART STDY 3+ LVLS: CPT

## 2022-08-22 PROCEDURE — 93978 VASCULAR STUDY: CPT

## 2022-08-22 PROCEDURE — 93925 LOWER EXTREMITY STUDY: CPT | Performed by: SURGERY

## 2022-08-22 PROCEDURE — 93925 LOWER EXTREMITY STUDY: CPT

## 2022-08-22 PROCEDURE — 93978 VASCULAR STUDY: CPT | Performed by: SURGERY

## 2022-08-22 PROCEDURE — 93922 UPR/L XTREMITY ART 2 LEVELS: CPT | Performed by: SURGERY

## 2022-08-23 ENCOUNTER — TRANSCRIBE ORDERS (OUTPATIENT)
Dept: VASCULAR SURGERY | Facility: CLINIC | Age: 77
End: 2022-08-23

## 2022-08-23 ENCOUNTER — TELEPHONE (OUTPATIENT)
Dept: NEPHROLOGY | Facility: CLINIC | Age: 77
End: 2022-08-23

## 2022-08-23 DIAGNOSIS — K55.1 MESENTERIC ARTERY STENOSIS (HCC): Primary | ICD-10-CM

## 2022-08-23 DIAGNOSIS — I74.3 EMBOLISM AND THROMBOSIS OF ARTERIES OF THE LOWER EXTREMITIES (HCC): Primary | ICD-10-CM

## 2022-08-23 DIAGNOSIS — I70.0 AORTOILIAC STENOSIS, LEFT (HCC): ICD-10-CM

## 2022-08-23 DIAGNOSIS — I70.1 ATHEROSCLEROSIS OF RENAL ARTERY (HCC): ICD-10-CM

## 2022-08-23 DIAGNOSIS — I70.0 AORTOILIAC STENOSIS, LEFT (HCC): Primary | ICD-10-CM

## 2022-08-23 DIAGNOSIS — I70.213 ATHEROSCLEROSIS OF NATIVE ARTERIES OF EXTREMITIES WITH INTERMITTENT CLAUDICATION, BILATERAL LEGS (HCC): Primary | ICD-10-CM

## 2022-09-08 ENCOUNTER — TELEPHONE (OUTPATIENT)
Dept: NEPHROLOGY | Facility: CLINIC | Age: 77
End: 2022-09-08

## 2022-09-08 NOTE — TELEPHONE ENCOUNTER
Patient is on the recall list  Called patient to schedule a 4 months Nephrology appointment with Dr Yamile Donovan as per patient she is unable to schedule appointment due to her  has dementia and she doesn't drive  Patient will call back when she is available   Remove patient from recall list

## 2022-09-12 PROBLEM — Z71.2 ENCOUNTER TO DISCUSS TEST RESULTS: Status: ACTIVE | Noted: 2022-09-12

## 2022-09-12 NOTE — PROGRESS NOTES
Problem List Items Addressed This Visit        Genitourinary    Malignant neoplasm of overlapping sites of bladder ([facility] [address] ) - Primary       Other    Encounter to discuss test results            Discussion:    Kami Li has been going through various stressors at home including worsening lower extremity discomfort and pain, her  has required a stay in various rehabilitation institutions as well and his dementia is worsening  From a bladder cancer perspective she has no recurrence on cystoscopy today  She will continue yearly surveillance cystoscopy going forward  She does mention having stomach cancer, 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  She states that in terms of goals of care she does not want aggressive management there of at this time  All questions and concerns answered and addressed  I will see her back in 1 year for her surveillance cystoscopy for history of low risk bladder cancer    Assessment and plan:       Please see problem oriented charting for the assessment plan of today's urological complaints      Papi Hsu MD      Chief Complaint     Chief Complaint   Patient presents with    Cystoscopy         History of Present Illness     Tony Shown is a 78 y o  woman with bladder cancer resected in 2020, showing noninvasive low-grade cancer  She is here today for surveillance cystoscopy  Her E[employer] performance status is 0 new complaints include worsening leg pain and stresses regarding looking after her  as his dementia worsens      The following portions of the patient's history were reviewed and updated as appropriate: allergies, current medications, past family history, past medical history, past social history, past surgical history and problem list        Detailed Urologic History     - please refer to HPI    Review of Systems     Review of Systems   Constitutional: Negative  HENT: Negative  Eyes: Negative  Respiratory: Negative  Cardiovascular: Negative  Gastrointestinal: Negative  Endocrine: Negative  Genitourinary: Negative  Musculoskeletal: Positive for arthralgias, back pain and myalgias  Skin: Negative  Allergic/Immunologic: Negative  Neurological: Negative  Hematological: Negative  Psychiatric/Behavioral: Negative  Allergies     Allergies   Allergen Reactions    Fenofibrate Other (See Comments)      blood in urine  hx  Kidney Failure    Colesevelam Other (See Comments)      leg pains    Colestipol Itching and Other (See Comments)      Swelling lower legs    Ezetimibe GI Intolerance    Statins Myalgia       Physical Exam     Physical Exam  Vitals reviewed  Exam conducted with a chaperone present  Constitutional:       General: She is not in acute distress  Appearance: Normal appearance  She is not ill-appearing, toxic-appearing or diaphoretic  HENT:      Head: Normocephalic and atraumatic  Eyes:      General: No scleral icterus  Right eye: No discharge  Left eye: No discharge  Cardiovascular:      Pulses: Normal pulses  Pulmonary:      Effort: Pulmonary effort is normal    Abdominal:      General: There is no distension  Palpations: There is no mass  Genitourinary:     General: Normal vulva  Comments: Age related vaginal changes present    Musculoskeletal:         General: No swelling  Skin:     General: Skin is warm  Neurological:      General: No focal deficit present  Mental Status: She is alert and oriented to person, place, and time  Cranial Nerves: No cranial nerve deficit  Psychiatric:         Mood and Affect: Mood normal          Behavior: Behavior normal          Thought Content:  Thought content normal          Judgment: Judgment normal              Vital Signs  Vitals:    09/13/22 1052   BP: 130/70   BP Location: Left arm   Patient Position: Sitting   Cuff Size: Large   Pulse: 78   Resp: 20   SpO2: 99%   Weight: 77 kg (169 lb 12 8 oz)   Height: 4' 10" (1 473 m)         Current Medications       Current Outpatient Medications:     allopurinol (ZYLOPRIM) 100 mg tablet, Take 1 tablet (100 mg total) by mouth daily, Disp: 90 tablet, Rfl: 2    amLODIPine (NORVASC) 2 5 mg tablet, TAKE 1 TABLET BY MOUTH EVERY DAY, Disp: 90 tablet, Rfl: 2    cloNIDine (CATAPRES-TTS-3) 0 3 mg/24 hr, PLACE 1 PATCH (0 3 MG TOTAL) ON THE SKIN ONCE A WEEK, Disp: 12 patch, Rfl: 2    clopidogrel (PLAVIX) 75 mg tablet, Take 1 tablet (75 mg total) by mouth daily, Disp: 90 tablet, Rfl: 1    Icosapent Ethyl (Vascepa) 1 g CAPS, 2 CAP TWICE A DAY WITH FOOD, Disp: 360 capsule, Rfl: 2    labetalol (NORMODYNE) 300 mg tablet, TAKE 1 TABLET BY MOUTH TWICE A DAY, Disp: 180 tablet, Rfl: 4    lidocaine (LIDODERM) 5 %, APPLY 1 PATCH TOPICALLY DAILY REMOVE & DISCARD PATCH WITHIN 12 HOURS OR AS DIRECTED BY MD, Disp: 30 patch, Rfl: 6    losartan (COZAAR) 100 MG tablet, TAKE 1 TABLET BY MOUTH EVERY DAY, Disp: 90 tablet, Rfl: 1      Active Problems     Patient Active Problem List   Diagnosis    Spinal stenosis    Basilar artery stenosis    Breast mass    Cerebrovascular accident (CVA) ([facility] [address] )    Chronic GERD    Stage 3 chronic kidney disease ([facility] [address] )    Claudication in peripheral vascular disease ([facility] [address] )    Type 2 diabetes mellitus with diabetic nephropathy (HCC)    Endometriosis    Gout    Hx-TIA (transient ischemic attack)    Hypercholesteremia    Hyperlipidemia associated with type 2 diabetes mellitus ([facility] [address] )    Hypertension    Hypertension associated with diabetes (HCC)    Osteoarthritis    Obesity (BMI 30-39  9)    Polyneuropathy    Right renal artery stenosis (HCC)    Vertebrobasilar artery syndrome    Vitamin D deficiency    Statin intolerance    Lumbar disc herniation    Pain of left lower extremity    Abnormal EKG    Spondylosis of lumbar region without myelopathy or radiculopathy    Aortoiliac stenosis, left (HCC)    Hypokalemia    Acute drug-induced gout of left foot    Decreased pulses in feet    Refused influenza vaccine    Hypercalcemia    Lumbosacral spondylosis without myelopathy    Neurodermatitis    Other proteinuria    Obesity with body mass index 30 or greater    Hyperlipidemia, unspecified    Herniated lumbar intervertebral disc    Atherosclerosis of renal artery (HCC)    Celiac artery stenosis (HCC) >70% stenosis in the celiac trunk    Abnormal CT of the chest suspicious for an infectious or inflammatory bronchiolitis   Positive cardiac stress test  small, mildly severe, partially reversible myocardial perfusion defect of anterior and inferior wall     Femoral artery stenosis, right (HCC) 50-75% stenosis in the common femoral artery      Mesenteric artery stenosis (HCC)    Immunization not carried out because of patient refusal    Median arcuate ligament syndrome ([facility] [address] )    Abdominal bruit    Initial Medicare annual wellness visit    Hypertensive kidney disease with stage 3 chronic kidney disease ([address] )    Atherosclerosis of native arteries of extremities with intermittent claudication, bilateral legs (HCC)    Asymptomatic bilateral carotid artery stenosis    Pulmonary nodule 7 mm groundglass opacity in the right upper lobe, unchanged since October 2019    Stenosis of left anterior descending artery Mid LAD 50-60% stenosis    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    Urinary frequency    Malignant neoplasm of overlapping sites of bladder ([address] )    Cervical radiculopathy    Edema of left lower extremity    Stage 4 chronic kidney disease ([address]    Hypertensive kidney disease with stage 4 chronic kidney disease (HCC)    Hyperuricemia    Embolism and 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)    Depression, recurrent (HCC)    Obesity, morbid ([address]    Type 2 diabetes mellitus with chronic kidney disease ([address] )    Type 2 diabetes mellitus with diabetic peripheral angiopathy without gangrene (HCC)    Failure to thrive in adult    COVID-19    Sacral ulcer ([facility] [address] )    RICARDO (acute kidney injury) ([facility] [address] )    Gastrointestinal stromal tumor (GIST) (HCC)    Pressure injury of buttock, stage 2 ([facility] [address] )    History of gastrointestinal stromal tumor (GIST)    Secondary hyperparathyroidism of renal origin ([facility] [address] )    Encounter to discuss test results         Past Medical History     Past Medical History:   Diagnosis Date    Arthritis     Chronic kidney disease     Endometriosis     High cholesterol     Hypertension     Kidney disease, chronic, stage III (moderate, EGFR 30-59 ml/min) ([facility])     Lumbar disc herniation     Neuropathy     Peripheral vascular disease ([facility] [address] )     Pneumonia     Shoulder injury     left    Spinal stenosis     Stroke ([facility] [address] ) 2015    Memory loss         Surgical History     Past Surgical History:   Procedure Laterality Date    CARDIAC CATHETERIZATION      COLONOSCOPY      FL RETROGRADE PYELOGRAM  4/6/2020    FRACTURE SURGERY Right     ankle    OVARIAN CYST SURGERY      AZ CYSTOSCOPY,INSERT URETERAL STENT Right 4/6/2020    Procedure: INSERTION STENT URETERAL;  Surgeon: Vernon Goltz, MD;  [facility];  Service: Urology    AZ CYSTOURETHROSCOPY,FULGUR 0 5-2 CM LESN N/A 4/6/2020    Procedure: TRANSURETHRAL RESECTION OF BLADDER TUMOR (TURBT);   Surgeon: Vernon Goltz, MD;  [facility];  Service: Urology    AZ CYSTOURETHROSCOPY,URETER CATHETER Bilateral 4/6/2020    Procedure: CYSTOSCOPY WITH RETROGRADE PYELOGRAM;  Surgeon: Vernon Goltz, MD;  [facility];  Service: Urology    ROTATOR CUFF REPAIR Left     TONSILLECTOMY           Family History     Family History   Problem Relation Age of Onset    Hypertension Mother     Heart disease Mother         Valvular    Hyperlipidemia Mother     Hypertension Father     Heart defect Father         Cardiomegaly    Stroke Sister         Cerebrovascular Accident    Arthritis Brother  Other Brother         Back Disorder         Social History     Social History     Social History     Tobacco Use   Smoking Status Former Smoker    Packs/day: 2 00    Years: 40 00    Pack years: 80 00    Types: Cigarettes    Start date:     Quit date: 2020    Years since quittin 1   Smokeless Tobacco Never Used         Pertinent Lab Values     Lab Results   Component Value Date    CREATININE 1 70 (H) 08/10/2022                 Pertinent Imaging      No recent urologic imaging for my review

## 2022-09-13 ENCOUNTER — PROCEDURE VISIT (OUTPATIENT)
Dept: UROLOGY | Facility: CLINIC | Age: 77
End: 2022-09-13
Payer: COMMERCIAL

## 2022-09-13 VITALS
DIASTOLIC BLOOD PRESSURE: 70 MMHG | WEIGHT: 169.8 LBS | OXYGEN SATURATION: 99 % | HEART RATE: 78 BPM | BODY MASS INDEX: 35.64 KG/M2 | SYSTOLIC BLOOD PRESSURE: 130 MMHG | HEIGHT: 58 IN | RESPIRATION RATE: 20 BRPM

## 2022-09-13 DIAGNOSIS — Z71.2 ENCOUNTER TO DISCUSS TEST RESULTS: ICD-10-CM

## 2022-09-13 DIAGNOSIS — C67.8 MALIGNANT NEOPLASM OF OVERLAPPING SITES OF BLADDER (HCC): Primary | ICD-10-CM

## 2022-09-13 PROCEDURE — 3075F SYST BP GE 130 - 139MM HG: CPT | Performed by: UROLOGY

## 2022-09-13 PROCEDURE — 3078F DIAST BP <80 MM HG: CPT | Performed by: UROLOGY

## 2022-09-13 PROCEDURE — 99213 OFFICE O/P EST LOW 20 MIN: CPT | Performed by: UROLOGY

## 2022-09-13 PROCEDURE — 1160F RVW MEDS BY RX/DR IN RCRD: CPT | Performed by: UROLOGY

## 2022-09-13 PROCEDURE — 52000 CYSTOURETHROSCOPY: CPT | Performed by: UROLOGY

## 2022-09-13 NOTE — LETTER
September 13, 2022     Shantel De Leon, [address] [facility] [address]    Patient: Parul Flores   YOB: 1945   Date of Visit: 9/13/2022       Dear Dr Myrna Hu: Thank you for referring Steph Bass to me for evaluation  Below are my notes for this consultation  If you have questions, please do not hesitate to call me  I look forward to following your patient along with you  Sincerely,        Marta Penn MD        CC: No Recipients  Marta Penn MD  9/13/2022 11:16 AM  Sign when Signing Visit  Office Cystoscopy Procedure Note    Indication:    Urothelial carcinoma of the bladder, surveillance      Informed consent   The risks, benefits, complications, treatment options, and expected outcomes were discussed with the patient  The patient concurred with the proposed plan and provided informed consent  Anesthesia  Lidocaine jelly 2%    Antibiotic prophylaxis   None    Procedure  In the presence of a female nurse, the patient was placed in the supine frog-legged position, was prepped and draped in the usual manner using sterile technique, and 2% lidocaine jelly instilled into the urethra  Prior to this pelvic examination showed atrophic labia majora and minora, a normal vaginal introitus, moderate quality vaginal mucosa, and showed no pelvic floor descensus and no urethral hypermobility  Upon stress maneuvers there was no stress incontinence  A 17 F flexible cystoscope was then inserted into the urethra and the urethra and bladder carefully examined    The following findings were noted:    Findings:  Urethra:  Normal  Bladder:  Normal  Ureteral orifices:  Normal  Other findings:  None, a retroflex view confirms    Specimens: None                 Complications:    None; patient tolerated the procedure well           Disposition: To home            Condition: Stable    Plan: Negative for recurrence of low risk bladder cancer       Cystoscopy     Date/Time 9/13/2022 11:15 AM Performed by  Zia Huerta MD     Authorized by Zia Huerta MD      Universal Protocol:  Consent: Verbal consent obtained  Written consent obtained  Risks and benefits: risks, benefits and alternatives were discussed  Consent given by: patient  Patient understanding: patient states understanding of the procedure being performed  Patient consent: the patient's understanding of the procedure matches consent given  Procedure consent: procedure consent matches procedure scheduled  Relevant documents: relevant documents present and verified  Test results: test results available and properly labeled  Site marked: the operative site was not marked  Radiology Images displayed and confirmed  If images not available, report reviewed: imaging studies available  Required items: required blood products, implants, devices, and special equipment available  Patient identity confirmed: verbally with patient and provided demographic data        Procedure Details:  Procedure type: cystoscopy    Patient tolerance: Patient tolerated the procedure well with no immediate complications    Additional Procedure Details: Office Cystoscopy Procedure Note    Indication:    Urothelial carcinoma of the bladder, surveillance      Informed consent   The risks, benefits, complications, treatment options, and expected outcomes were discussed with the patient  The patient concurred with the proposed plan and provided informed consent  Anesthesia  Lidocaine jelly 2%    Antibiotic prophylaxis   None    Procedure  In the presence of a female nurse, the patient was placed in the supine frog-legged position, was prepped and draped in the usual manner using sterile technique, and 2% lidocaine jelly instilled into the urethra  Prior to this pelvic examination showed atrophic labia majora and minora, a normal vaginal introitus, moderate quality vaginal mucosa, and showed no pelvic floor descensus and no urethral hypermobility   Upon stress maneuvers there was no stress incontinence  A 17 F flexible cystoscope was then inserted into the urethra and the urethra and bladder carefully examined  The following findings were noted:    Findings:  Urethra:  Normal  Bladder:  Normal  Ureteral orifices:  Normal  Other findings:  None, a retroflex view confirms    Specimens: None                 Complications:    None; patient tolerated the procedure well           Disposition: To home            Condition: Stable    Plan: Negative for recurrence of low risk bladder cancer            Imtiaz Roberts MD  9/13/2022 11:14 AM  Sign when Signing Visit       Problem List Items Addressed This Visit        Genitourinary    Malignant neoplasm of overlapping sites of bladder ([facility] [address] ) - Primary       Other    Encounter to discuss test results            Discussion:    Martha Shoemaker has been going through various stressors at home including worsening lower extremity discomfort and pain, her  has required a stay in various rehabilitation institutions as well and his dementia is worsening  From a bladder cancer perspective she has no recurrence on cystoscopy today  She will continue yearly surveillance cystoscopy going forward  She does mention having stomach cancer, 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  She states that in terms of goals of care she does not want aggressive management there of at this time  All questions and concerns answered and addressed  I will see her back in 1 year for her surveillance cystoscopy for history of low risk bladder cancer    Assessment and plan:       Please see problem oriented charting for the assessment plan of today's urological complaints      Imtiaz Roberts MD      Chief Complaint     Chief Complaint   Patient presents with    Cystoscopy         History of Present Illness     Dhruv Leslie is a 78 y o  woman with bladder cancer resected in 2020, showing noninvasive low-grade cancer    She is here today for surveillance cystoscopy  Her ECOG performance status is 0 new complaints include worsening leg pain and stresses regarding looking after her  as his dementia worsens      The following portions of the patient's history were reviewed and updated as appropriate: allergies, current medications, past family history, past medical history, past social history, past surgical history and problem list        Detailed Urologic History     - please refer to HPI    Review of Systems     Review of Systems   Constitutional: Negative  HENT: Negative  Eyes: Negative  Respiratory: Negative  Cardiovascular: Negative  Gastrointestinal: Negative  Endocrine: Negative  Genitourinary: Negative  Musculoskeletal: Positive for arthralgias, back pain and myalgias  Skin: Negative  Allergic/Immunologic: Negative  Neurological: Negative  Hematological: Negative  Psychiatric/Behavioral: Negative  Allergies     Allergies   Allergen Reactions    Fenofibrate Other (See Comments)      blood in urine  hx  Kidney Failure    Colesevelam Other (See Comments)      leg pains    Colestipol Itching and Other (See Comments)      Swelling lower legs    Ezetimibe GI Intolerance    Statins Myalgia       Physical Exam     Physical Exam  Vitals reviewed  Exam conducted with a chaperone present  Constitutional:       General: She is not in acute distress  Appearance: Normal appearance  She is not ill-appearing, toxic-appearing or diaphoretic  HENT:      Head: Normocephalic and atraumatic  Eyes:      General: No scleral icterus  Right eye: No discharge  Left eye: No discharge  Cardiovascular:      Pulses: Normal pulses  Pulmonary:      Effort: Pulmonary effort is normal    Abdominal:      General: There is no distension  Palpations: There is no mass  Genitourinary:     General: Normal vulva        Comments: Age related vaginal changes present    Musculoskeletal: General: No swelling  Skin:     General: Skin is warm  Neurological:      General: No focal deficit present  Mental Status: She is alert and oriented to person, place, and time  Cranial Nerves: No cranial nerve deficit  Psychiatric:         Mood and Affect: Mood normal          Behavior: Behavior normal          Thought Content:  Thought content normal          Judgment: Judgment normal              Vital Signs  Vitals:    09/13/22 1052   BP: 130/70   BP Location: Left arm   Patient Position: Sitting   Cuff Size: Large   Pulse: 78   Resp: 20   SpO2: 99%   Weight: 77 kg (169 lb 12 8 oz)   Height: 4' 10" (1 473 m)         Current Medications       Current Outpatient Medications:     allopurinol (ZYLOPRIM) 100 mg tablet, Take 1 tablet (100 mg total) by mouth daily, Disp: 90 tablet, Rfl: 2    amLODIPine (NORVASC) 2 5 mg tablet, TAKE 1 TABLET BY MOUTH EVERY DAY, Disp: 90 tablet, Rfl: 2    cloNIDine (CATAPRES-TTS-3) 0 3 mg/24 hr, PLACE 1 PATCH (0 3 MG TOTAL) ON THE SKIN ONCE A WEEK, Disp: 12 patch, Rfl: 2    clopidogrel (PLAVIX) 75 mg tablet, Take 1 tablet (75 mg total) by mouth daily, Disp: 90 tablet, Rfl: 1    Icosapent Ethyl (Vascepa) 1 g CAPS, 2 CAP TWICE A DAY WITH FOOD, Disp: 360 capsule, Rfl: 2    labetalol (NORMODYNE) 300 mg tablet, TAKE 1 TABLET BY MOUTH TWICE A DAY, Disp: 180 tablet, Rfl: 4    lidocaine (LIDODERM) 5 %, APPLY 1 PATCH TOPICALLY DAILY REMOVE & DISCARD PATCH WITHIN 12 HOURS OR AS DIRECTED BY MD, Disp: 30 patch, Rfl: 6    losartan (COZAAR) 100 MG tablet, TAKE 1 TABLET BY MOUTH EVERY DAY, Disp: 90 tablet, Rfl: 1      Active Problems     Patient Active Problem List   Diagnosis    Spinal stenosis    Basilar artery stenosis    Breast mass    C[facility]vascular accident (CVA) ([facility] [address] )    Chronic GERD    Stage 3 chronic kidney disease ([address] )    Claudication in peripheral vascular disease (HCC)    Type 2 diabetes mellitus with diabetic nephropathy (HCC)    Endometriosis    Gout  Hx-TIA (transient ischemic attack)    Hypercholesteremia    Hyperlipidemia associated with type 2 diabetes mellitus ([address] )    Hypertension    Hypertension associated with diabetes ([address] )    Osteoarthritis    Obesity (BMI 30-39  9)    Polyneuropathy    Right renal artery stenosis (HCC)    Vertebrobasilar artery syndrome    Vitamin D deficiency    Statin intolerance    Lumbar disc herniation    Pain of left lower extremity    Abnormal EKG    Spondylosis of lumbar region without myelopathy or radiculopathy    Aortoiliac stenosis, left (HCC)    Hypokalemia    Acute drug-induced gout of left foot    Decreased pulses in feet    Refused influenza vaccine    Hypercalcemia    Lumbosacral spondylosis without myelopathy    Neurodermatitis    Other proteinuria    Obesity with body mass index 30 or greater    Hyperlipidemia, unspecified    Herniated lumbar intervertebral disc    Atherosclerosis of renal artery (HCC)    Celiac artery stenosis (HCC) >70% stenosis in the celiac trunk    Abnormal CT of the chest suspicious for an infectious or inflammatory bronchiolitis   Positive cardiac stress test  small, mildly severe, partially reversible myocardial perfusion defect of anterior and inferior wall     Femoral artery stenosis, right (HCC) 50-75% stenosis in the common femoral artery      Mesenteric artery stenosis (HCC)    Immunization not carried out because of patient refusal    Median arcuate ligament syndrome ([facility] [address] )    Abdominal bruit    Initial Medicare annual wellness visit    Hypertensive kidney disease with stage 3 chronic kidney disease ([address] )    Atherosclerosis of native arteries of extremities with intermittent claudication, bilateral legs (HCC)    Asymptomatic bilateral carotid artery stenosis    Pulmonary nodule 7 mm groundglass opacity in the right upper lobe, unchanged since October 2019    Stenosis of left anterior descending artery Mid LAD 50-60% stenosis    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    Urinary frequency    Malignant neoplasm of overlapping sites of bladder (HCC)    Cervical radiculopathy    Edema of left lower extremity    Stage 4 chronic kidney disease (HCC)    Hypertensive kidney disease with stage 4 chronic kidney disease (HCC)    Hyperuricemia    Embolism and 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)    Depression, recurrent (HCC)    Obesity, morbid ([address] )    Type 2 diabetes mellitus with chronic kidney disease (HCC)    Type 2 diabetes mellitus with diabetic peripheral angiopathy without gangrene (HCC)    Failure to thrive in adult    COVID-19    Sacral ulcer ([facility] [address] )    RICARDO (acute kidney injury) ([facility] [address] )    Gastrointestinal stromal tumor (GIST) ([facility] [address] )    Pressure injury of buttock, stage 2 ([address] )    History of gastrointestinal stromal tumor (GIST)    Secondary hyperparathyroidism of renal origin ([facility] [address] )    Encounter to discuss test results         Past Medical History     Past Medical History:   Diagnosis Date    Arthritis     Chronic kidney disease     Endometriosis     High cholesterol     Hypertension     Kidney disease, chronic, stage III (moderate, EGFR 30-59 ml/min) (HCC)     Lumbar disc herniation     Neuropathy     Peripheral vascular disease ([address] )     Pneumonia     Shoulder injury     left    Spinal stenosis     Stroke ([address] ) 2015    Memory loss         Surgical History     Past Surgical History:   Procedure Laterality Date    CARDIAC CATHETERIZATION      COLONOSCOPY      FL RETROGRADE PYELOGRAM  4/6/2020    FRACTURE SURGERY Right     ankle    OVARIAN CYST SURGERY      OK CYSTOSCOPY,INSERT URETERAL STENT Right 4/6/2020    Procedure: INSERTION STENT URETERAL;  Surgeon: Papi Hsu MD;  [facility];  Service: Urology    OK CYSTOURETHROSCOPY,FULGUR 0 5-2 CM AYSE N/A 4/6/2020    Procedure: TRANSURETHRAL RESECTION OF BLADDER TUMOR (TURBT);   Surgeon: Arthur Porras MD;  [facility];  Service: Urology    MT CYSTOURETHROSCOPY,URETER CATHETER Bilateral 2020    Procedure: CYSTOSCOPY WITH RETROGRADE PYELOGRAM;  Surgeon: Arthur Porras MD;  [facility];  Service: Urology    ROTATOR CUFF REPAIR Left     TONSILLECTOMY           Family History     Family History   Problem Relation Age of Onset    Hypertension Mother     Heart disease Mother         Valvular    Hyperlipidemia Mother     Hypertension Father     Heart defect Father         Cardiomegaly    Stroke Sister         Cerebrovascular Accident    Arthritis Brother     Other Brother         Back Disorder         Social History     Social History     Social History     Tobacco Use   Smoking Status Former Smoker    Packs/day: 2 00    Years: 40 00    Pack years: 80 00    Types: Cigarettes    Start date:     Quit date: 2020    Years since quittin 1   Smokeless Tobacco Never Used         Pertinent Lab Values     Lab Results   Component Value Date    CREATININE 1 70 (H) 08/10/2022                 Pertinent Imaging      No recent urologic imaging for my review

## 2022-09-13 NOTE — PROGRESS NOTES
Office Cystoscopy Procedure Note    Indication:    Urothelial carcinoma of the bladder, surveillance      Informed consent   The risks, benefits, complications, treatment options, and expected outcomes were discussed with the patient  The patient concurred with the proposed plan and provided informed consent  Anesthesia  Lidocaine jelly 2%    Antibiotic prophylaxis   None    Procedure  In the presence of a female nurse, the patient was placed in the supine frog-legged position, was prepped and draped in the usual manner using sterile technique, and 2% lidocaine jelly instilled into the urethra  Prior to this pelvic examination showed atrophic labia majora and minora, a normal vaginal introitus, moderate quality vaginal mucosa, and showed no pelvic floor descensus and no urethral hypermobility  Upon stress maneuvers there was no stress incontinence  A 17 F flexible cystoscope was then inserted into the urethra and the urethra and bladder carefully examined  The following findings were noted:    Findings:  Urethra:  Normal  Bladder:  Normal  Ureteral orifices:  Normal  Other findings:  None, a retroflex view confirms    Specimens: None                 Complications:    None; patient tolerated the procedure well           Disposition: To home            Condition: Stable    Plan: Negative for recurrence of low risk bladder cancer       Cystoscopy     Date/Time 9/13/2022 11:15 AM     Performed by  Vernon Goltz, MD     Authorized by Vernon Goltz, MD      Universal Protocol:  Consent: Verbal consent obtained  Written consent obtained    Risks and benefits: risks, benefits and alternatives were discussed  Consent given by: patient  Patient understanding: patient states understanding of the procedure being performed  Patient consent: the patient's understanding of the procedure matches consent given  Procedure consent: procedure consent matches procedure scheduled  Relevant documents: relevant documents present and verified  Test results: test results available and properly labeled  Site marked: the operative site was not marked  Radiology Images displayed and confirmed  If images not available, report reviewed: imaging studies available  Required items: required blood products, implants, devices, and special equipment available  Patient identity confirmed: verbally with patient and provided demographic data        Procedure Details:  Procedure type: cystoscopy    Patient tolerance: Patient tolerated the procedure well with no immediate complications    Additional Procedure Details: Office Cystoscopy Procedure Note    Indication:    Urothelial carcinoma of the bladder, surveillance      Informed consent   The risks, benefits, complications, treatment options, and expected outcomes were discussed with the patient  The patient concurred with the proposed plan and provided informed consent  Anesthesia  Lidocaine jelly 2%    Antibiotic prophylaxis   None    Procedure  In the presence of a female nurse, the patient was placed in the supine frog-legged position, was prepped and draped in the usual manner using sterile technique, and 2% lidocaine jelly instilled into the urethra  Prior to this pelvic examination showed atrophic labia majora and minora, a normal vaginal introitus, moderate quality vaginal mucosa, and showed no pelvic floor descensus and no urethral hypermobility  Upon stress maneuvers there was no stress incontinence  A 17 F flexible cystoscope was then inserted into the urethra and the urethra and bladder carefully examined    The following findings were noted:    Findings:  Urethra:  Normal  Bladder:  Normal  Ureteral orifices:  Normal  Other findings:  None, a retroflex view confirms    Specimens: None                 Complications:    None; patient tolerated the procedure well           Disposition: To home            Condition: Stable    Plan: Negative for recurrence of low risk bladder cancer

## 2022-09-26 PROCEDURE — 3066F NEPHROPATHY DOC TX: CPT | Performed by: UROLOGY

## 2022-10-04 ENCOUNTER — HOSPITAL ENCOUNTER (OUTPATIENT)
Dept: NON INVASIVE DIAGNOSTICS | Facility: CLINIC | Age: 77
Discharge: HOME | End: 2022-10-04
Payer: COMMERCIAL

## 2022-10-04 DIAGNOSIS — K55.1 MESENTERIC ARTERY STENOSIS (HCC): ICD-10-CM

## 2022-10-04 DIAGNOSIS — I70.1 ATHEROSCLEROSIS OF RENAL ARTERY (HCC): ICD-10-CM

## 2022-10-04 PROCEDURE — 93975 VASCULAR STUDY: CPT

## 2022-10-06 PROCEDURE — 93975 VASCULAR STUDY: CPT | Performed by: SURGERY

## 2022-10-11 ENCOUNTER — OFFICE VISIT (OUTPATIENT)
Dept: VASCULAR SURGERY | Facility: CLINIC | Age: 77
End: 2022-10-11
Payer: COMMERCIAL

## 2022-10-11 VITALS
TEMPERATURE: 98.2 F | BODY MASS INDEX: 35.48 KG/M2 | SYSTOLIC BLOOD PRESSURE: 164 MMHG | WEIGHT: 169 LBS | HEIGHT: 58 IN | HEART RATE: 72 BPM | DIASTOLIC BLOOD PRESSURE: 104 MMHG

## 2022-10-11 DIAGNOSIS — I65.23 ASYMPTOMATIC BILATERAL CAROTID ARTERY STENOSIS: ICD-10-CM

## 2022-10-11 DIAGNOSIS — I70.213 ATHEROSCLEROSIS OF NATIVE ARTERIES OF EXTREMITIES WITH INTERMITTENT CLAUDICATION, BILATERAL LEGS (HCC): ICD-10-CM

## 2022-10-11 DIAGNOSIS — N18.4 HYPERTENSIVE KIDNEY DISEASE WITH STAGE 4 CHRONIC KIDNEY DISEASE (HCC): ICD-10-CM

## 2022-10-11 DIAGNOSIS — I74.09 AORTOILIAC OCCLUSIVE DISEASE (HCC): ICD-10-CM

## 2022-10-11 DIAGNOSIS — E78.5 HYPERLIPIDEMIA, UNSPECIFIED HYPERLIPIDEMIA TYPE: ICD-10-CM

## 2022-10-11 DIAGNOSIS — I12.9 HYPERTENSIVE KIDNEY DISEASE WITH STAGE 4 CHRONIC KIDNEY DISEASE (HCC): ICD-10-CM

## 2022-10-11 DIAGNOSIS — E11.21 TYPE 2 DIABETES MELLITUS WITH DIABETIC NEPHROPATHY, WITHOUT LONG-TERM CURRENT USE OF INSULIN (HCC): Primary | ICD-10-CM

## 2022-10-11 DIAGNOSIS — I77.1 CELIAC ARTERY STENOSIS (HCC): ICD-10-CM

## 2022-10-11 DIAGNOSIS — I70.1 ATHEROSCLEROSIS OF RENAL ARTERY (HCC): ICD-10-CM

## 2022-10-11 DIAGNOSIS — I10 HYPERTENSION, UNSPECIFIED TYPE: ICD-10-CM

## 2022-10-11 PROCEDURE — 99214 OFFICE O/P EST MOD 30 MIN: CPT | Performed by: PHYSICIAN ASSISTANT

## 2022-10-11 NOTE — PROGRESS NOTES
Assessment/Plan: Aortoiliac occlusive disease  Atherosclerosis of native arteries of extremities with intermittent claudication, bilateral legs (HCC)  -chronic leg claudication with mixed spinal stenosis  -mostly L>R Hip pain  -no ischemic rest pain or wounds  -severe B PAD  -offered LE angiogram by Dr Ge Trevizo but declined study    -AOIL/ TEA  8/22/22:    R 0 4/75/45; severe, diffuse fem-popltieal disease with occlusion mid to distal SFA; 50-75% CFA  Atresia of the CFA and SFA  Anterior tibial occluded  L 0 51/108/54; severe, diffuse fem-popl disease with occlusion mid to distal SFA; > 75% PFA  Atresia of the CFA and SFA  Distal PTA occluded  Plan:  -Severe bilateral fem-popliteal disease with SFA occlusions and CFA disease  -No accelerating symptoms, ischemic rest pain or wounds  -Walking as tolerated; try to build up to walking 20-30 minutes a day  -Continue with clopidogrel 75 mg daily  -Unable to tolerate statin therapy and was unable to obtain injectable cholesterol medicine  -Talk to PCP regarding diabetes and blood pressure control  -Continue with smoking cessation  -Where good protective footwear at all times  -Check feet daily for any wounds or changes  -Follow-up in 3 months for re-evaluation      Renal artery stenosis  Hypertensive kidney disease with stage 4 chronic kidney disease (HCC)  -     VAS renal artery complete; Future  -Hypertension and no flow to the L kidney    -Renal duplex 10/4/22:     R > 60% renal artery stenosis; kidney 10 99 cm (10 9 cm)    L renal not visualized   No flow into the L kidney; kidney 6 9 cm    Plan:  -BP elevated in the office today, but didn't take BP pills  -BUN/creat 29/1 70 (107/2 34 earlier this year)  -follow up with neprology  -continue to optimize blood pressure control    Asymptomatic bilateral carotid artery stenosis  -Bilateral 50-69% carotid stenosis (R 171/37, ratio 1 82; L 198/41, ratio 1 43)  -CV duplex q 6 months  -patient education regarding sx of stroke and reasons to go to ED    Uncontrolled diabetes (A1c 9 4)  -we discussed the importance of diabetes control  -follow up with PCP regarding diabetes management       Additional diagnoses:    Celiac artery stenosis (HCC) >70% stenosis in the celiac trunk    Hypertension, unspecified type    Hyperlipidemia, unspecified hyperlipidemia type      Subjective:      Patient ID: Debbie Aguayo is a 78 y o  female  Patient presents today to review TEA, AOIL and renal artery duplex  HPI    Debbie Aguayo is a 78 y o  female with HTN, HLD, CKD III, DM, hx lacunar CVA, multivessel CAD, PAD, bladder mass s/p TURBT '20, renal artery stenosis, mesenteric stenosis, carotid artery disease and continued tobacco addiction followed by vascular for multiple vascular problems  Patient with chronic, stable short distance claudication, as well as "a lot of arthritis and spinal stenosis" so she can't get around much  She can have pain with ADL's (such as housework) for which she has to rest  She also describes R leg pain related to position when in bed  She was offered angiogram in the past but declined since she ultimately does not want to go on dialysis  She has no sx of mesenteric ischemia  No symptoms of TIA/stroke  She is going to seek an order for a motor scooter from her PCP so she can get around better  She also has known R FÉLIX and small left kidney  Lipids are not at goal   with   She was asked to check with cardiology regarding possibility of injectable cholesterol Rx  Her A1C is also elevated at 7 3      10/11/22: Patient presents for annual office visit  She was last seen by Dr Luis Daniel Phan in '20 and '21  Prior to that she was seen by me in 09/2020  She was offered LE angiogram and potential intervention by Dr Luis Daniel Phan but declined in part due to renal function since she will not go on dialysis if her renal function further deteriorates       Patient reports daily symptoms of bilateral leg pain mostly in the L >R hips after walking 5-10 minutes for which she has to rest for up to 5 minutes and then can walk more  Pain has not been worsening  She reports that the legs are good in the morning but as the day goes on they get more tired  She used to be able to do more work, but now it takes her longer to get things done  She isn't particularly troubled by this by her functional status  She has obtained a wheelchair  She has stable diabetic neuropathy  No ischemic rest pain or wounds  Fortunately, she quit smoking a couple of years ago  She was encouraged to continue with smoking cessation for her overall health  She has no sx of TIA/stroke  BP elevated today but she states that she didn't take her medications  She is advised to check BP at home and review with PCP or nephrology  She has declined statins, insulin, angiogram, and in her chart it is noted that she declined EGD to evaluate gastric tumor  We reviewed her recent labs and testing   Tg 356 H 41      -L>R  Hip pain  -Back/ hip pain / spinal stenosis           AOIL 8/22/22:  Operative History:  2020-02-26 Cardiac catheterization  Risk Factors  The patient has history of Obesity, HTN, Diabetes (NIDDM (Diet),  Hyperlipidemia, CKD, PAD, CAD and previous smoking (quit <1yr ago)  Clinical  Right Pressure:  198/ mm Hg, Left Pressure:  191/ mm Hg  FINDINGS:     Unilateral     Impression      PSV (cm/s)  EDV (cm/s)    Sup-Liz Ao                             49           0    Px Inf-David Ao  Not visualized                            Ds Inf-David Ao                         123          30    Celiac         >70%                   276          58    Prox  SMA                              76          13       Right       Impression      PSV (cm/s)  EDV (cm/s)    Prox JACI    Not visualized                            Dist JACI    Not visualized                            Prox   EIA                          131          23    Dist EIA 176          36    Prox Renal  Not Visualized                               Left        Impression      PSV (cm/s)  EDV (cm/s)    Prox JACI                           103          20    Dist JACI                            84          17    Prox  EIA                          130          17    Dist EIA                            95          11    Prox Renal  Not Visualized                                     CONCLUSION:  Impression  The visualized portions of the abdominal aorta are patent and normal in caliber  with diffuse disease  Findings suggestive of a >70% stenosis in the celiac trunk  The right common iliac, superior mesenteric, and bilateral renal arteries were  not visualized  The external iliac arteries are patent and normal in caliber bilaterally with  diffuse disease visualized throughout  RIGHT LOWER LIMB:  Ankle/Brachial index: 0 4 which is in the ischemic disease category (Prior  0 51)  PVR/ PPG tracings are dampened  Metatarsal pressure of 75mmHg  Great toe pressure of 45mmHg, within the healing range, Prior 74mmHg  LEFT LOWER LIMB:  Ankle/Brachial index: 0 51 which is in the severe disease category (Prior 0 50)  PVR/ PPG tracings are dampened  Metatarsal pressure of 108mmHg  Great toe pressure of 54mmHg, within the healing range, Prior 66mmHg  Technically difficult/limited study due to overlying bowel gas and patient body  habitus  Compared to previous study on 03/03/2021, there is no significant change noted        TEA 8/22/22  FINDINGS:     Segment                Right                            Left                                            Impression      PSV (cm/s)  EDV  Impression  PSV (cm/s)    Common Femoral Artery  50-75%                 238                           67    Prox Profunda          Not visualized                   >75%               429    Prox SFA                                       62    6  Occluded             0    Mid SFA Occluded                 0       Occluded             0    Dist SFA               Occluded                 0       Occluded             0    Proximal Pop                                   43                           32    Distal Pop                                     37                           33    Prox Post Tibial                                                            36    Dist Post Tibial                               33       Occluded             0    Dist  Ant  Tibial      Occluded                 0                           22    Dist Peroneal                                                               31             CONCLUSION:  Impression:  RIGHT LOWER LIMB:  Severe diffuse disease in the femoral and popliteal arteries with an occlusion  of the mid to distal superficial femoral artery  There is a 50-75% stenosis of the common femoral artery  There is arterial atresia noted throughout the common and superficial femoral  arteries  The anterior tibial artery is occluded  Ankle/Brachial index: 0 4 which is in the ischemic disease category (Prior  0 51)  PVR/ PPG tracings are dampened  Metatarsal pressure of 75mmHg  Great toe pressure of 45mmHg, within the healing range, Prior 74mmHg  LEFT LOWER LIMB:  Severe diffuse disease in the femoral and popliteal arteries with an occlusion  of the proximal to distal superficial femoral artery  There is a >75% stenosis of the profunda femoris artery  There is arterial atresia noted throughout the common and superficial femoral  arteries  The distal posterior tibial artery is occluded  Ankle/Brachial index: 0 51 which is in the severe disease category (Prior 0 50)  PVR/ PPG tracings are dampened  Metatarsal pressure of 108mmHg  Great toe pressure of 54mmHg, within the healing range, Prior 66mmHg  Compared to previous study on 09/08/2020, there is an occlusion of the right  distal SFA              Operative History:  2020-02-26 Cardiac catheterization  Risk Factors  The patient has history of Obesity, HTN, Diabetes (NIDDM (Diet)),  Hyperlipidemia, CKD, PAD, CAD and previous smoking (quit <1yr ago)  Clinical  Right Pressure:  142/82 mm Hg, Left Pressure:  142/82 mm Hg  FINDINGS:     Unilateral     Impression  PSV (cm/s)  EDV (cm/s)    RI    Sup-Liz Ao                         68           0  1 00    Celiac         >70%               240         125  0 48    Px Inf-David Ao                      70           0  1 00    Ds Inf-Daivd Ao                     101          30  0 70       Right          Impression  PSV (cm/s)  EDV (cm/s)   RAR    RI  Kidney (cm)    Ostial Renal   60 - 99%           216          39  3 18  0 82                 Prox Renal                        182          42  2 68  0 82                 Mid Renal                         135          23  1 98  0 83                 Dist Renal     60 - 99%           494          56  7 27  0 89                 Celiac Artery                      49          12        0 75                 Kidney                                                               10 90       Left           Kidney (cm)    Kidney                6 90             CONCLUSION:     Impression  The abdominal aorta is widely patent and normal caliber in the visualized  segment    Diffuse disease is visualized throughout  RIGHT RENAL:  There is a >60% stenosis of the renal artery  Patent renal vein  Renovascular resistive index of 0 75  Renal/Aorta Ratio: 7 27  The Kidney measures 10 99cm, Prior 10 9 m     LEFT RENAL:  The main renal artery was not visualized  No flow noted in the left kidney  Renal/Aorta Ratio: main renal artery not visualized   The Kidney measures  6 9cm, Prior 6 8cm  MESENTERIC:  >70% stenosis noted in the celiac artery  The superior mesenteric artery not visualized  Technically difficult/limited study due to body habitus and heavy overlying  bowel gas       Compared to previous study on 03/03/2021, there is no significant change noted  The following portions of the patient's history were reviewed and updated as appropriate: allergies, current medications, past family history, past medical history, past social history, past surgical history and problem list     Review of Systems   Constitutional: Negative  HENT: Negative  Eyes: Negative  Respiratory: Positive for shortness of breath  Cardiovascular: Negative  Gastrointestinal: Negative  Endocrine: Negative  Genitourinary: Negative  Musculoskeletal: Positive for arthralgias, back pain and gait problem  Skin: Negative  Allergic/Immunologic: Negative  Neurological: Positive for weakness  Hematological: Negative  Psychiatric/Behavioral: Negative  Objective:    BP (!) 164/104 (BP Location: Right arm, Patient Position: Sitting)   Pulse 72   Temp 98 2 °F (36 8 °C) (Tympanic)   Ht 4' 10" (1 473 m)   Wt 76 7 kg (169 lb)   LMP  (LMP Unknown)   BMI 35 32 kg/m²     Morbid obesity    Abdomen is obese  No bruits appreciated  Non-tender, non-palpable aorta    No leg edema  No palpable pulses in the feet which are otherwise warm and free of wounds  Cap refill is about 2 seconds  Physical Exam  Vitals and nursing note reviewed  Constitutional:       Appearance: She is well-developed  She is obese  HENT:      Head: Normocephalic and atraumatic  Eyes:      Pupils: Pupils are equal, round, and reactive to light  Neck:      Thyroid: No thyromegaly  Vascular: No JVD  Trachea: Trachea normal    Cardiovascular:      Rate and Rhythm: Normal rate and regular rhythm  Pulses:           Carotid pulses are 2+ on the right side and 2+ on the left side  Radial pulses are 2+ on the right side and 2+ on the left side  Dorsalis pedis pulses are detected w/ Doppler on the right side and detected w/ Doppler on the left side        Heart sounds: Normal heart sounds, S1 normal and S2 normal  No murmur heard  No friction rub  No gallop  Comments: Unable to appreciate femoral pulses but difficult exam due to body habitus  Pulmonary:      Effort: Pulmonary effort is normal  No accessory muscle usage or respiratory distress  Breath sounds: Normal breath sounds  No wheezing or rales  Abdominal:      General: Bowel sounds are normal  There is no distension  Palpations: Abdomen is soft  Tenderness: There is no abdominal tenderness  Musculoskeletal:         General: No deformity  Normal range of motion  Cervical back: Neck supple  Right lower leg: No edema  Left lower leg: No edema  Skin:     General: Skin is warm and dry  Capillary Refill: Capillary refill takes less than 2 seconds  Findings: No lesion or rash  Nails: There is no clubbing  Neurological:      Mental Status: She is alert and oriented to person, place, and time  Comments: Grossly normal    Psychiatric:         Behavior: Behavior is cooperative  I have reviewed and made appropriate changes to the review of systems input by the medical assistant  Vitals:    10/11/22 1112   BP: (!) 164/104   BP Location: Right arm   Patient Position: Sitting   Pulse: 72   Temp: 98 2 °F (36 8 °C)   TempSrc: Tympanic   Weight: 76 7 kg (169 lb)   Height: 4' 10" (1 473 m)       Patient Active Problem List   Diagnosis   • Spinal stenosis   • Basilar artery stenosis   • Breast mass   • Cerebrovascular accident (CVA) ([facility] [address] )   • Chronic GERD   • Stage 3 chronic kidney disease (HCC)   • Claudication in peripheral vascular disease (HCC)   • Type 2 diabetes mellitus with diabetic nephropathy (HCC)   • Endometriosis   • Gout   • Hx-TIA (transient ischemic attack)   • Hypercholesteremia   • Hyperlipidemia associated with type 2 diabetes mellitus ([address] )   • Hypertension   • Hypertension associated with diabetes ([address] )   • Osteoarthritis   • Obesity (BMI 30-39  9)   • Polyneuropathy   • Right renal artery stenosis (HCC)   • Vertebrobasilar artery syndrome   • Vitamin D deficiency   • Statin intolerance   • Lumbar disc herniation   • Pain of left lower extremity   • Abnormal EKG   • Spondylosis of lumbar region without myelopathy or radiculopathy   • Aortoiliac stenosis, left (HCC)   • Hypokalemia   • Acute drug-induced gout of left foot   • Decreased pulses in feet   • Refused influenza vaccine   • Hypercalcemia   • Lumbosacral spondylosis without myelopathy   • Neurodermatitis   • Other proteinuria   • Obesity with body mass index 30 or greater   • Hyperlipidemia, unspecified   • Herniated lumbar intervertebral disc   • Atherosclerosis of renal artery (HCC)   • Celiac artery stenosis (HCC) >70% stenosis in the celiac trunk   • Abnormal CT of the chest suspicious for an infectious or inflammatory bronchiolitis  • Positive cardiac stress test  small, mildly severe, partially reversible myocardial perfusion defect of anterior and inferior wall    • Femoral artery stenosis, right (HCC) 50-75% stenosis in the common femoral artery     • Mesenteric artery stenosis (HCC)   • Immunization not carried out because of patient refusal   • Median arcuate ligament syndrome (HCC)   • Abdominal bruit   • Initial Medicare annual wellness visit   • Hypertensive kidney disease with stage 3 chronic kidney disease ([facility] [address] )   • Atherosclerosis of native arteries of extremities with intermittent claudication, bilateral legs (HCC)   • Asymptomatic bilateral carotid artery stenosis   • Pulmonary nodule 7 mm groundglass opacity in the right upper lobe, unchanged since October 2019   • Stenosis of left anterior descending artery Mid LAD 50-60% stenosis   • 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   • Urinary frequency   • Malignant neoplasm of overlapping sites of bladder [facility])   • Cervical radiculopathy   • Edema of left lower extremity   • Stage 4 chronic kidney disease ([facility] [address] )   • Hypertensive kidney disease with stage 4 chronic kidney disease (HCC)   • Hyperuricemia   • Embolism and 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)   • Depression, recurrent (HCC)   • Obesity, morbid ([facility] [address] )   • Type 2 diabetes mellitus with chronic kidney disease (HCC)   • Type 2 diabetes mellitus with diabetic peripheral angiopathy without gangrene ([facility] [address] )   • Failure to thrive in adult   • COVID-19   • Sacral ulcer (HCC)   • RICARDO (acute kidney injury) ([facility] [address] )   • Gastrointestinal stromal tumor (GIST) (HCC)   • Pressure injury of buttock, stage 2 (HCC)   • History of gastrointestinal stromal tumor (GIST)   • Secondary hyperparathyroidism of renal origin ([facility] [address] )   • Encounter to discuss test results       Past Surgical History:   Procedure Laterality Date   • CARDIAC CATHETERIZATION     • COLONOSCOPY     • FL RETROGRADE PYELOGRAM  4/6/2020   • FRACTURE SURGERY Right     ankle   • OVARIAN CYST SURGERY     • GA CYSTOSCOPY,INSERT URETERAL STENT Right 4/6/2020    Procedure: INSERTION STENT URETERAL;  Surgeon: Saji Hayes MD;  [facility];  Service: Urology   • GA CYSTOURETHROSCOPY,FULGUR 0 5-2 CM LESN N/A 4/6/2020    Procedure: TRANSURETHRAL RESECTION OF BLADDER TUMOR (TURBT);   Surgeon: Saji Hayes MD;  [facility];  Service: Urology   • GA CYSTOURETHROSCOPY,URETER CATHETER Bilateral 4/6/2020    Procedure: CYSTOSCOPY WITH RETROGRADE PYELOGRAM;  Surgeon: Saji Hayes MD;  [facility];  Service: Urology   • ROTATOR CUFF REPAIR Left    • TONSILLECTOMY         Family History   Problem Relation Age of Onset   • Hypertension Mother    • Heart disease Mother         Valvular   • Hyperlipidemia Mother    • Hypertension Father    • Heart defect Father         Cardiomegaly   • Stroke Sister         Cerebrovascular Accident   • Arthritis Brother    • Other Brother         Back Disorder       Social History     Socioeconomic History   • Marital status: /Civil Union     Spouse name: Not on file   • Number of children: Not on file   • Years of education: Not on file   • Highest education level: Not on file   Occupational History   • Occupation: retired   Tobacco Use   • Smoking status: Former Smoker     Packs/day: 2 00     Years: 40 00     Pack years: 80 00     Types: Cigarettes     Start date:      Quit date: 2020     Years since quittin 2   • Smokeless tobacco: Never Used   Vaping Use   • Vaping Use: Never used   Substance and Sexual Activity   • Alcohol use: Never   • Drug use: Never   • Sexual activity: Not Currently     Partners: Male   Other Topics Concern   • Not on file   Social History Narrative    Occasional Caffeine Consumption     Social Determinants of Health     Financial Resource Strain: Not on file   Food Insecurity: No Food Insecurity   • Worried About Running Out of Food in the Last Year: Never true   • Ran Out of Food in the Last Year: Never true   Transportation Needs: No Transportation Needs   • Lack of Transportation (Medical): No   • Lack of Transportation (Non-Medical):  No   Physical Activity: Not on file   Stress: Not on file   Social Connections: Not on file   Intimate Partner Violence: Not on file   Housing Stability: Low Risk    • Unable to Pay for Housing in the Last Year: No   • Number of Places Lived in the Last Year: 1   • Unstable Housing in the Last Year: No       Allergies   Allergen Reactions   • Fenofibrate Other (See Comments)      blood in urine  hx  Kidney Failure   • Colesevelam Other (See Comments)      leg pains   • Colestipol Itching and Other (See Comments)      Swelling lower legs   • Ezetimibe GI Intolerance   • Statins Myalgia         Current Outpatient Medications:   •  allopurinol (ZYLOPRIM) 100 mg tablet, Take 1 tablet (100 mg total) by mouth daily, Disp: 90 tablet, Rfl: 2  •  amLODIPine (NORVASC) 2 5 mg tablet, TAKE 1 TABLET BY MOUTH EVERY DAY, Disp: 90 tablet, Rfl: 2  •  cloNIDine (CATAPRES-TTS-3) 0 3 mg/24 hr, PLACE 1 PATCH (0 3 MG TOTAL) ON THE SKIN ONCE A WEEK, Disp: 12 patch, Rfl: 2  •  clopidogrel (PLAVIX) 75 mg tablet, Take 1 tablet (75 mg total) by mouth daily, Disp: 90 tablet, Rfl: 1  •  Icosapent Ethyl (Vascepa) 1 g CAPS, 2 CAP TWICE A DAY WITH FOOD, Disp: 360 capsule, Rfl: 2  •  labetalol (NORMODYNE) 300 mg tablet, TAKE 1 TABLET BY MOUTH TWICE A DAY, Disp: 180 tablet, Rfl: 2  •  losartan (COZAAR) 100 MG tablet, TAKE 1 TABLET BY MOUTH EVERY DAY, Disp: 90 tablet, Rfl: 1  •  lidocaine (LIDODERM) 5 %, APPLY 1 PATCH TOPICALLY DAILY REMOVE & DISCARD PATCH WITHIN 12 HOURS OR AS DIRECTED BY MD (Patient not taking: Reported on 10/11/2022), Disp: 30 patch, Rfl: 6

## 2022-10-11 NOTE — PATIENT INSTRUCTIONS
Aortoiliac occlusive disease  Severe peripheral arterial disease  High cholesterol   Uncontrolled diabetes (A1c 9 4)  Renal artery stenosis      Plan:  -continue with smoking cessation  -optimize blood pressure, cholesterol and glucose control  -talk to PCP regarding diabetes and blood pressure  -walking as tolerated   -try to build up to walking 20-30 minutes a day  -continue with clopidogrel 75 mg daily  -unable to tolerate statin therapy and was unable to obtain injectable cholesterol medicine  -where good protective footwear at all times  -check feet daily for any wounds or changes  -follow-up in 3 months for re-evaluation

## 2022-10-13 ENCOUNTER — TELEPHONE (OUTPATIENT)
Dept: NEUROLOGY | Facility: CLINIC | Age: 77
End: 2022-10-13

## 2022-10-13 NOTE — TELEPHONE ENCOUNTER
Daniel Chris had an appt on 11/21/22 with [facility] at the [place] office but cancelled due to having to many thing going on right and son being her transportation   She asked to just cancel and shew ill call back to reschedule at another time        Thank you,     Christine Goldsmith Stable from renal perspective  Nephrology to see in formal follow up tomorrow  Next HD Tues, 11/9  Patient is now ESRD

## 2022-11-21 ENCOUNTER — VBI (OUTPATIENT)
Dept: ADMINISTRATIVE | Facility: OTHER | Age: 77
End: 2022-11-21

## 2022-11-21 NOTE — TELEPHONE ENCOUNTER
11/21/22 2:19 PM     [employer] SmartForm used to document caregap status      [facility] Aleksandra

## 2022-12-17 DIAGNOSIS — E11.59 HYPERTENSION ASSOCIATED WITH DIABETES (HCC): ICD-10-CM

## 2022-12-17 DIAGNOSIS — E78.5 HYPERLIPIDEMIA ASSOCIATED WITH TYPE 2 DIABETES MELLITUS (HCC): ICD-10-CM

## 2022-12-17 DIAGNOSIS — I65.1 BASILAR ARTERY STENOSIS: ICD-10-CM

## 2022-12-17 DIAGNOSIS — I73.9 CLAUDICATION IN PERIPHERAL VASCULAR DISEASE (HCC): ICD-10-CM

## 2022-12-17 DIAGNOSIS — I70.0 AORTOILIAC STENOSIS, LEFT (HCC): ICD-10-CM

## 2022-12-17 DIAGNOSIS — I15.2 HYPERTENSION ASSOCIATED WITH DIABETES (HCC): ICD-10-CM

## 2022-12-17 DIAGNOSIS — E11.69 HYPERLIPIDEMIA ASSOCIATED WITH TYPE 2 DIABETES MELLITUS (HCC): ICD-10-CM

## 2022-12-17 DIAGNOSIS — R21 RASH: ICD-10-CM

## 2022-12-17 DIAGNOSIS — Z86.73 HISTORY OF CVA (CEREBROVASCULAR ACCIDENT): ICD-10-CM

## 2022-12-17 DIAGNOSIS — R60.9 DEPENDENT EDEMA: ICD-10-CM

## 2022-12-17 DIAGNOSIS — I10 HYPERTENSION, UNSPECIFIED TYPE: ICD-10-CM

## 2022-12-19 RX ORDER — CLONIDINE 0.3 MG/24H
1 PATCH, EXTENDED RELEASE TRANSDERMAL WEEKLY
Qty: 12 PATCH | Refills: 2 | Status: SHIPPED | OUTPATIENT
Start: 2022-12-19

## 2023-01-17 DIAGNOSIS — I65.1 BASILAR ARTERY STENOSIS: ICD-10-CM

## 2023-01-17 DIAGNOSIS — E11.69 HYPERLIPIDEMIA ASSOCIATED WITH TYPE 2 DIABETES MELLITUS (HCC): ICD-10-CM

## 2023-01-17 DIAGNOSIS — R21 RASH: ICD-10-CM

## 2023-01-17 DIAGNOSIS — I10 HYPERTENSION, UNSPECIFIED TYPE: ICD-10-CM

## 2023-01-17 DIAGNOSIS — E11.59 HYPERTENSION ASSOCIATED WITH DIABETES (HCC): ICD-10-CM

## 2023-01-17 DIAGNOSIS — I73.9 CLAUDICATION IN PERIPHERAL VASCULAR DISEASE (HCC): ICD-10-CM

## 2023-01-17 DIAGNOSIS — I15.2 HYPERTENSION ASSOCIATED WITH DIABETES (HCC): ICD-10-CM

## 2023-01-17 DIAGNOSIS — I70.0 AORTOILIAC STENOSIS, LEFT (HCC): ICD-10-CM

## 2023-01-17 DIAGNOSIS — Z86.73 HISTORY OF CVA (CEREBROVASCULAR ACCIDENT): ICD-10-CM

## 2023-01-17 DIAGNOSIS — R60.9 DEPENDENT EDEMA: ICD-10-CM

## 2023-01-17 DIAGNOSIS — E78.5 HYPERLIPIDEMIA ASSOCIATED WITH TYPE 2 DIABETES MELLITUS (HCC): ICD-10-CM

## 2023-01-18 RX ORDER — AMLODIPINE BESYLATE 2.5 MG/1
TABLET ORAL
Qty: 90 TABLET | Refills: 0 | Status: SHIPPED | OUTPATIENT
Start: 2023-01-18

## 2023-01-18 RX ORDER — LOSARTAN POTASSIUM 100 MG/1
TABLET ORAL
Qty: 90 TABLET | Refills: 0 | Status: SHIPPED | OUTPATIENT
Start: 2023-01-18

## 2023-01-21 DIAGNOSIS — E79.0 HYPERURICEMIA: ICD-10-CM

## 2023-01-23 RX ORDER — ALLOPURINOL 100 MG/1
TABLET ORAL
Qty: 90 TABLET | Refills: 2 | Status: SHIPPED | OUTPATIENT
Start: 2023-01-23

## 2023-01-30 ENCOUNTER — OFFICE VISIT (OUTPATIENT)
Dept: INTERNAL MEDICINE CLINIC | Facility: CLINIC | Age: 78
End: 2023-01-30

## 2023-01-30 VITALS
RESPIRATION RATE: 16 BRPM | SYSTOLIC BLOOD PRESSURE: 160 MMHG | BODY MASS INDEX: 35.89 KG/M2 | DIASTOLIC BLOOD PRESSURE: 80 MMHG | HEIGHT: 58 IN | WEIGHT: 171 LBS

## 2023-01-30 DIAGNOSIS — E11.21 TYPE 2 DIABETES MELLITUS WITH DIABETIC NEPHROPATHY, WITHOUT LONG-TERM CURRENT USE OF INSULIN (HCC): Primary | ICD-10-CM

## 2023-01-30 DIAGNOSIS — I12.9 HYPERTENSIVE KIDNEY DISEASE WITH STAGE 4 CHRONIC KIDNEY DISEASE (HCC): ICD-10-CM

## 2023-01-30 DIAGNOSIS — E11.69 HYPERLIPIDEMIA ASSOCIATED WITH TYPE 2 DIABETES MELLITUS (HCC): ICD-10-CM

## 2023-01-30 DIAGNOSIS — F33.9 DEPRESSION, RECURRENT (HCC): ICD-10-CM

## 2023-01-30 DIAGNOSIS — N18.4 HYPERTENSIVE KIDNEY DISEASE WITH STAGE 4 CHRONIC KIDNEY DISEASE (HCC): ICD-10-CM

## 2023-01-30 DIAGNOSIS — I65.1 BASILAR ARTERY STENOSIS: ICD-10-CM

## 2023-01-30 DIAGNOSIS — I73.9 CLAUDICATION IN PERIPHERAL VASCULAR DISEASE (HCC): ICD-10-CM

## 2023-01-30 DIAGNOSIS — N25.81 SECONDARY HYPERPARATHYROIDISM OF RENAL ORIGIN (HCC): ICD-10-CM

## 2023-01-30 DIAGNOSIS — E78.5 HYPERLIPIDEMIA ASSOCIATED WITH TYPE 2 DIABETES MELLITUS (HCC): ICD-10-CM

## 2023-01-30 DIAGNOSIS — E11.59 HYPERTENSION ASSOCIATED WITH DIABETES (HCC): ICD-10-CM

## 2023-01-30 DIAGNOSIS — I74.09 AORTOILIAC OCCLUSIVE DISEASE (HCC): ICD-10-CM

## 2023-01-30 DIAGNOSIS — I70.0 AORTOILIAC STENOSIS, LEFT (HCC): ICD-10-CM

## 2023-01-30 DIAGNOSIS — I15.2 HYPERTENSION ASSOCIATED WITH DIABETES (HCC): ICD-10-CM

## 2023-01-30 PROBLEM — L98.429 SACRAL ULCER (HCC): Status: RESOLVED | Noted: 2022-01-22 | Resolved: 2023-01-30

## 2023-01-30 PROBLEM — R62.7 FAILURE TO THRIVE IN ADULT: Status: RESOLVED | Noted: 2022-01-22 | Resolved: 2023-01-30

## 2023-01-30 PROBLEM — E87.6 HYPOKALEMIA: Status: RESOLVED | Noted: 2019-03-07 | Resolved: 2023-01-30

## 2023-01-30 PROBLEM — L89.302 PRESSURE INJURY OF BUTTOCK, STAGE 2 (HCC): Status: RESOLVED | Noted: 2022-01-24 | Resolved: 2023-01-30

## 2023-01-30 PROBLEM — E79.0 HYPERURICEMIA: Status: RESOLVED | Noted: 2020-11-03 | Resolved: 2023-01-30

## 2023-01-30 PROBLEM — M10.272 ACUTE DRUG-INDUCED GOUT OF LEFT FOOT: Status: RESOLVED | Noted: 2019-04-02 | Resolved: 2023-01-30

## 2023-01-30 PROBLEM — R60.0 EDEMA OF LEFT LOWER EXTREMITY: Status: RESOLVED | Noted: 2020-07-01 | Resolved: 2023-01-30

## 2023-01-30 PROBLEM — U07.1 COVID-19: Status: RESOLVED | Noted: 2022-01-22 | Resolved: 2023-01-30

## 2023-01-30 PROBLEM — Z28.21 REFUSED INFLUENZA VACCINE: Status: RESOLVED | Noted: 2019-10-22 | Resolved: 2023-01-30

## 2023-01-30 PROBLEM — R35.0 URINARY FREQUENCY: Status: RESOLVED | Noted: 2020-04-15 | Resolved: 2023-01-30

## 2023-01-30 PROBLEM — E83.52 HYPERCALCEMIA: Status: RESOLVED | Noted: 2019-06-25 | Resolved: 2023-01-30

## 2023-01-30 LAB — SL AMB POCT HEMOGLOBIN AIC: 10.1 (ref ?–6.5)

## 2023-01-30 RX ORDER — AMLODIPINE BESYLATE 5 MG/1
5 TABLET ORAL DAILY
Qty: 90 TABLET | Refills: 0 | Status: SHIPPED | OUTPATIENT
Start: 2023-01-30 | End: 2023-01-30

## 2023-01-30 RX ORDER — AMLODIPINE BESYLATE 5 MG/1
5 TABLET ORAL DAILY
Qty: 90 TABLET | Refills: 3 | Status: SHIPPED | OUTPATIENT
Start: 2023-01-30 | End: 2023-04-30

## 2023-01-30 NOTE — PROGRESS NOTES
Assessment/Plan:     [facility] is here to establish care; former johana pt; she has a PMH for vascular disease including FÉLIX, PVD, h/o uncontrolled DM, uncontrolled HTN, uncontrolled HLD  H/o CVA on Plavix  a1c is 10 1; refusing insulin; will send in [address] and see if it is covered; it is kidney protective; if GFR < 30 will not give this medication; she has not seen endocrine despite referrals  HTN is not controlled; on clonidine and labetalol; on small dose of amlodipine; will increase to 5 mg  She cannot tolerate statin; she is on vascepa;    Need updated labs  Quality Measures:     BMI Counseling: There is no height or weight on file to calculate BMI  The BMI is above normal  Nutrition recommendations include decreasing portion sizes and encouraging healthy choices of fruits and vegetables  Exercise recommendations include moderate physical activity 150 minutes/week  Rationale for BMI follow-up plan is due to patient being overweight or obese  Return in about 3 months (around 4/30/2023) for regular and medicare  No problem-specific Assessment & Plan notes found for this encounter  Diagnoses and all orders for this visit:    Type 2 diabetes mellitus with diabetic nephropathy, without long-term current use of insulin (HCC)  -     POCT hemoglobin A1c  -     CBC and differential; Future  -     Comprehensive metabolic panel; Future  -     dapagliflozin (Farxiga) 10 MG tablet; Take 1 tablet (10 mg total) by mouth in the morning    Aortoiliac occlusive disease (HCC)    Depression, recurrent (HCC)    Hypertensive kidney disease with stage 4 chronic kidney disease (HCC)  -     TSH, 3rd generation with Free T4 reflex; Future  -     Lipid Panel with Direct LDL reflex; Future    Secondary hyperparathyroidism of renal origin ([facility] )    Hypertension associated with diabetes ([facility] )  -     Discontinue: amLODIPine (NORVASC) 5 mg tablet;  Take 1 tablet (5 mg total) by mouth daily  -     amLODIPine (NORVASC) 5 mg tablet; Take 1 tablet (5 mg total) by mouth daily    Aortoiliac stenosis, left (HCC)  -     Discontinue: amLODIPine (NORVASC) 5 mg tablet; Take 1 tablet (5 mg total) by mouth daily  -     amLODIPine (NORVASC) 5 mg tablet; Take 1 tablet (5 mg total) by mouth daily    Claudication in peripheral vascular disease (HCC)  -     Discontinue: amLODIPine (NORVASC) 5 mg tablet; Take 1 tablet (5 mg total) by mouth daily  -     amLODIPine (NORVASC) 5 mg tablet; Take 1 tablet (5 mg total) by mouth daily    Basilar artery stenosis  -     Discontinue: amLODIPine (NORVASC) 5 mg tablet; Take 1 tablet (5 mg total) by mouth daily  -     amLODIPine (NORVASC) 5 mg tablet; Take 1 tablet (5 mg total) by mouth daily    Hyperlipidemia associated with type 2 diabetes mellitus ([facility] )  -     Discontinue: amLODIPine (NORVASC) 5 mg tablet; Take 1 tablet (5 mg total) by mouth daily  -     amLODIPine (NORVASC) 5 mg tablet; Take 1 tablet (5 mg total) by mouth daily          Subjective:      Patient ID: Trina Ochoa is a 78 y o  female  Here to establish care; former [facility] pt        ALLERGIES:  Allergies   Allergen Reactions   • Fenofibrate Other (See Comments)      blood in urine  hx  Kidney Failure   • Colesevelam Other (See Comments)      leg pains   • Colestipol Itching and Other (See Comments)      Swelling lower legs   • Ezetimibe GI Intolerance   • Statins Myalgia       CURRENT MEDICATIONS:    Current Outpatient Medications:   •  allopurinol (ZYLOPRIM) 100 mg tablet, TAKE 1 TABLET BY MOUTH EVERY DAY, Disp: 90 tablet, Rfl: 2  •  amLODIPine (NORVASC) 5 mg tablet, Take 1 tablet (5 mg total) by mouth daily, Disp: 90 tablet, Rfl: 3  •  cloNIDine (CATAPRES-TTS-3) 0 3 mg/24 hr, PLACE 1 PATCH (0 3 MG TOTAL) ON THE SKIN ONCE A WEEK, Disp: 12 patch, Rfl: 2  •  clopidogrel (PLAVIX) 75 mg tablet, Take 1 tablet (75 mg total) by mouth daily, Disp: 90 tablet, Rfl: 1  •  dapagliflozin (Farxiga) 10 MG tablet, Take 1 tablet (10 mg total) by mouth in the morning, Disp: 90 tablet, Rfl: 1  •  Icosapent Ethyl (Vascepa) 1 g CAPS, 2 CAP TWICE A DAY WITH FOOD, Disp: 360 capsule, Rfl: 2  •  labetalol (NORMODYNE) 300 mg tablet, TAKE 1 TABLET BY MOUTH TWICE A DAY, Disp: 180 tablet, Rfl: 2  •  losartan (COZAAR) 100 MG tablet, TAKE 1 TABLET BY MOUTH EVERY DAY, Disp: 90 tablet, Rfl: 0    ACTIVE PROBLEM LIST:  Patient Active Problem List   Diagnosis   • Spinal stenosis   • Basilar artery stenosis   • Breast mass   • Cerebrovascular accident (CVA) ([facility] )   • Chronic GERD   • Stage 3 chronic kidney disease (HCC)   • Claudication in peripheral vascular disease (HCC)   • Type 2 diabetes mellitus with diabetic nephropathy (HCC)   • Endometriosis   • Gout   • Hx-TIA (transient ischemic attack)   • Hypercholesteremia   • Hyperlipidemia associated with type 2 diabetes mellitus ([facility] )   • Hypertension   • Hypertension associated with diabetes ([place] [address] )   • Osteoarthritis   • Obesity (BMI 30-39  9)   • Polyneuropathy   • Right renal artery stenosis (HCC)   • Vertebrobasilar artery syndrome   • Vitamin D deficiency   • Statin intolerance   • Lumbar disc herniation   • Pain of left lower extremity   • Abnormal EKG   • Spondylosis of lumbar region without myelopathy or radiculopathy   • Aortoiliac stenosis, left (HCC)   • Decreased pulses in feet   • Lumbosacral spondylosis without myelopathy   • Neurodermatitis   • Other proteinuria   • Obesity with body mass index 30 or greater   • Hyperlipidemia, unspecified   • Herniated lumbar intervertebral disc   • Atherosclerosis of renal artery (HCC)   • Celiac artery stenosis (HCC) >70% stenosis in the celiac trunk   • Abnormal CT of the chest suspicious for an infectious or inflammatory bronchiolitis  • Positive cardiac stress test  small, mildly severe, partially reversible myocardial perfusion defect of anterior and inferior wall    • Femoral artery stenosis, right (HCC) 50-75% stenosis in the common femoral artery     • Mesenteric artery stenosis ([facility] )   • Immunization not carried out because of patient refusal   • Median arcuate ligament syndrome ([facility] )   • Abdominal bruit   • Initial Medicare annual wellness visit   • Hypertensive kidney disease with stage 3 chronic kidney disease ([facility] )   • Atherosclerosis of native arteries of extremities with intermittent claudication, bilateral legs ([facility])   • Asymptomatic bilateral carotid artery stenosis   • Pulmonary nodule 7 mm groundglass opacity in the right upper lobe, unchanged since October 2019   • Stenosis of left anterior descending artery Mid LAD 50-60% stenosis   • Right coronary artery occlusion ([facility])total occlusion of proximal RCA with collateral circ   • Malignant neoplasm of overlapping sites of bladder [facility])   • Cervical radiculopathy   • Stage 4 chronic kidney disease ([facility])   • Hypertensive kidney disease with stage 4 chronic kidney disease ([facility])   • Embolism and 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 ([facility])   • Depression, recurrent ([facility])   • Obesity, morbid ([place] [address] )   • Type 2 diabetes mellitus with chronic kidney disease ([facility] )   • Type 2 diabetes mellitus with diabetic peripheral angiopathy without gangrene ([facility] )   • RICARDO (acute kidney injury) ([facility] )   • Gastrointestinal stromal tumor (GIST) ([place] [address] )   • History of gastrointestinal stromal tumor (GIST)   • Secondary hyperparathyroidism of renal origin ([facility] )   • Encounter to discuss test results   • Aortoiliac occlusive disease ([facility] )       PAST MEDICAL HISTORY:  Past Medical History:   Diagnosis Date   • Arthritis    • Chronic kidney disease    • Endometriosis    • High cholesterol    • Hypertension    • Kidney disease, chronic, stage III (moderate, EGFR 30-59 ml/min) ([facility])    • Lumbar disc herniation    • Neuropathy    • Peripheral vascular disease ([facility] )    • Pneumonia    • Shoulder injury     left   • Spinal stenosis    • Stroke ([facility] ) 2015    Memory loss       PAST SURGICAL HISTORY:  Past Surgical History:   Procedure Laterality Date   • CARDIAC CATHETERIZATION     • COLONOSCOPY     • FL RETROGRADE PYELOGRAM  2020   • FRACTURE SURGERY Right     ankle   • OVARIAN CYST SURGERY     • FL CYSTO BLADDER W/URETERAL CATHETERIZATION Bilateral 2020    Procedure: CYSTOSCOPY WITH RETROGRADE PYELOGRAM;  Surgeon: Macey Moreno MD;  [facility];  Service: Urology   • FL CYSTO W/INSERT URETERAL STENT Right 2020    Procedure: INSERTION STENT URETERAL;  Surgeon: Macey Moreno MD;  [facility];  Service: Urology   • FL CYSTO W/REMOVAL OF TUMORS SMALL N/A 2020    Procedure: TRANSURETHRAL RESECTION OF BLADDER TUMOR (TURBT);   Surgeon: Macey Moreno MD;  [facility];  Service: Urology   • ROTATOR CUFF REPAIR Left    • TONSILLECTOMY         FAMILY HISTORY:  Family History   Problem Relation Age of Onset   • Hypertension Mother    • Heart disease Mother         Valvular   • Hyperlipidemia Mother    • Hypertension Father    • Heart defect Father         Cardiomegaly   • Stroke Sister         Cerebrovascular Accident   • Arthritis Brother    • Other Brother         Back Disorder       SOCIAL HISTORY:  Social History     Socioeconomic History   • Marital status: /Civil Union     Spouse name: Not on file   • Number of children: Not on file   • Years of education: Not on file   • Highest education level: Not on file   Occupational History   • Occupation: retired   Tobacco Use   • Smoking status: Former     Packs/day: 2 00     Years: 40 00     Pack years: 80 00     Types: Cigarettes     Start date:      Quit date: 2020     Years since quittin 5   • Smokeless tobacco: Never   Vaping Use   • Vaping Use: Never used   Substance and Sexual Activity   • Alcohol use: Never   • Drug use: Never   • Sexual activity: Not Currently     Partners: Male   Other Topics Concern   • Not on file   Social History Narrative    Occasional Caffeine Consumption     Social Determinants of Health     Financial Resource Strain: Not on file   Food Insecurity: Not on file   Transportation Needs: Not on file   Physical Activity: Not on file   Stress: Not on file   Social Connections: Not on file   Intimate Partner Violence: Not on file   Housing Stability: Not on file       Review of Systems   Musculoskeletal: Positive for arthralgias and gait problem  All other systems reviewed and are negative  Objective:  Vitals:    01/30/23 1510 01/30/23 1525   BP: (!) 184/100 160/80   BP Location: Left arm    Patient Position: Sitting    Resp: 16    Weight: 77 6 kg (171 lb)    Height: 4' 10" (1 473 m)      Body mass index is 35 74 kg/m²  Physical Exam  Vitals and nursing note reviewed  Constitutional:       Appearance: Normal appearance  She is obese  HENT:      Head: Normocephalic and atraumatic  Cardiovascular:      Rate and Rhythm: Normal rate and regular rhythm  Heart sounds: Normal heart sounds  Pulmonary:      Effort: Pulmonary effort is normal       Breath sounds: Normal breath sounds  Musculoskeletal:         General: Normal range of motion  Cervical back: Normal range of motion  Right lower leg: No edema  Left lower leg: No edema  Lymphadenopathy:      Cervical: No cervical adenopathy  Skin:     General: Skin is warm and dry  Neurological:      General: No focal deficit present  Mental Status: She is alert and oriented to person, place, and time  Mental status is at baseline  Gait: Gait abnormal    Psychiatric:         Mood and Affect: Mood normal            RESULTS:    Recent Results (from the past 1008 hour(s))   POCT hemoglobin A1c    Collection Time: 01/30/23  4:12 PM   Result Value Ref Range    Hemoglobin A1C 10 1 (A) 6 5       This note was created with voice recognition software  Phonic, grammatical and spelling errors may be present within the note as a result

## 2023-02-06 ENCOUNTER — TELEPHONE (OUTPATIENT)
Dept: LAB | Facility: HOSPITAL | Age: 78
End: 2023-02-06

## 2023-02-06 DIAGNOSIS — I15.2 HYPERTENSION ASSOCIATED WITH DIABETES (HCC): ICD-10-CM

## 2023-02-06 DIAGNOSIS — E11.69 HYPERLIPIDEMIA ASSOCIATED WITH TYPE 2 DIABETES MELLITUS (HCC): ICD-10-CM

## 2023-02-06 DIAGNOSIS — R60.9 DEPENDENT EDEMA: ICD-10-CM

## 2023-02-06 DIAGNOSIS — E78.5 HYPERLIPIDEMIA ASSOCIATED WITH TYPE 2 DIABETES MELLITUS (HCC): ICD-10-CM

## 2023-02-06 DIAGNOSIS — I73.9 CLAUDICATION IN PERIPHERAL VASCULAR DISEASE (HCC): ICD-10-CM

## 2023-02-06 DIAGNOSIS — E11.59 HYPERTENSION ASSOCIATED WITH DIABETES (HCC): ICD-10-CM

## 2023-02-06 DIAGNOSIS — R21 RASH: ICD-10-CM

## 2023-02-06 DIAGNOSIS — I10 HYPERTENSION, UNSPECIFIED TYPE: ICD-10-CM

## 2023-02-06 DIAGNOSIS — I70.0 AORTOILIAC STENOSIS, LEFT (HCC): ICD-10-CM

## 2023-02-06 DIAGNOSIS — I65.1 BASILAR ARTERY STENOSIS: ICD-10-CM

## 2023-02-06 DIAGNOSIS — Z86.73 HISTORY OF CVA (CEREBROVASCULAR ACCIDENT): ICD-10-CM

## 2023-02-06 RX ORDER — CLOPIDOGREL BISULFATE 75 MG/1
TABLET ORAL
Qty: 90 TABLET | Refills: 1 | Status: SHIPPED | OUTPATIENT
Start: 2023-02-06

## 2023-02-10 ENCOUNTER — VBI (OUTPATIENT)
Dept: ADMINISTRATIVE | Facility: OTHER | Age: 78
End: 2023-02-10

## 2023-02-15 ENCOUNTER — APPOINTMENT (OUTPATIENT)
Dept: LAB | Facility: HOSPITAL | Age: 78
End: 2023-02-15

## 2023-02-15 DIAGNOSIS — I12.9 HYPERTENSIVE KIDNEY DISEASE WITH STAGE 4 CHRONIC KIDNEY DISEASE (HCC): ICD-10-CM

## 2023-02-15 DIAGNOSIS — E11.21 TYPE 2 DIABETES MELLITUS WITH DIABETIC NEPHROPATHY, WITHOUT LONG-TERM CURRENT USE OF INSULIN (HCC): ICD-10-CM

## 2023-02-15 DIAGNOSIS — N18.4 HYPERTENSIVE KIDNEY DISEASE WITH STAGE 4 CHRONIC KIDNEY DISEASE (HCC): ICD-10-CM

## 2023-02-15 LAB
ALBUMIN SERPL BCP-MCNC: 3.7 G/DL (ref 3.5–5)
ALP SERPL-CCNC: 129 U/L (ref 46–116)
ALT SERPL W P-5'-P-CCNC: 20 U/L (ref 12–78)
ANION GAP SERPL CALCULATED.3IONS-SCNC: 7 MMOL/L (ref 4–13)
AST SERPL W P-5'-P-CCNC: 12 U/L (ref 5–45)
BASOPHILS # BLD AUTO: 0.08 THOUSANDS/ÂΜL (ref 0–0.1)
BASOPHILS NFR BLD AUTO: 1 % (ref 0–1)
BILIRUB SERPL-MCNC: 0.62 MG/DL (ref 0.2–1)
BUN SERPL-MCNC: 27 MG/DL (ref 5–25)
CALCIUM SERPL-MCNC: 10.2 MG/DL (ref 8.3–10.1)
CHLORIDE SERPL-SCNC: 103 MMOL/L (ref 96–108)
CHOLEST SERPL-MCNC: 368 MG/DL
CO2 SERPL-SCNC: 25 MMOL/L (ref 21–32)
CREAT SERPL-MCNC: 1.82 MG/DL (ref 0.6–1.3)
EOSINOPHIL # BLD AUTO: 0.29 THOUSAND/ÂΜL (ref 0–0.61)
EOSINOPHIL NFR BLD AUTO: 4 % (ref 0–6)
ERYTHROCYTE [DISTWIDTH] IN BLOOD BY AUTOMATED COUNT: 12.9 % (ref 11.6–15.1)
GFR SERPL CREATININE-BSD FRML MDRD: 26 ML/MIN/1.73SQ M
GLUCOSE P FAST SERPL-MCNC: 301 MG/DL (ref 65–99)
HCT VFR BLD AUTO: 44.4 % (ref 34.8–46.1)
HDLC SERPL-MCNC: 43 MG/DL
HGB BLD-MCNC: 15.3 G/DL (ref 11.5–15.4)
IMM GRANULOCYTES # BLD AUTO: 0.08 THOUSAND/UL (ref 0–0.2)
IMM GRANULOCYTES NFR BLD AUTO: 1 % (ref 0–2)
LDLC SERPL CALC-MCNC: 257 MG/DL (ref 0–100)
LYMPHOCYTES # BLD AUTO: 2.06 THOUSANDS/ÂΜL (ref 0.6–4.47)
LYMPHOCYTES NFR BLD AUTO: 28 % (ref 14–44)
MCH RBC QN AUTO: 30.4 PG (ref 26.8–34.3)
MCHC RBC AUTO-ENTMCNC: 34.5 G/DL (ref 31.4–37.4)
MCV RBC AUTO: 88 FL (ref 82–98)
MONOCYTES # BLD AUTO: 0.63 THOUSAND/ÂΜL (ref 0.17–1.22)
MONOCYTES NFR BLD AUTO: 9 % (ref 4–12)
NEUTROPHILS # BLD AUTO: 4.13 THOUSANDS/ÂΜL (ref 1.85–7.62)
NEUTS SEG NFR BLD AUTO: 57 % (ref 43–75)
NRBC BLD AUTO-RTO: 0 /100 WBCS
PLATELET # BLD AUTO: 238 THOUSANDS/UL (ref 149–390)
PMV BLD AUTO: 11 FL (ref 8.9–12.7)
POTASSIUM SERPL-SCNC: 4 MMOL/L (ref 3.5–5.3)
PROT SERPL-MCNC: 7.6 G/DL (ref 6.4–8.4)
RBC # BLD AUTO: 5.04 MILLION/UL (ref 3.81–5.12)
SODIUM SERPL-SCNC: 135 MMOL/L (ref 135–147)
TRIGL SERPL-MCNC: 338 MG/DL
TSH SERPL DL<=0.05 MIU/L-ACNC: 2.69 UIU/ML (ref 0.45–4.5)
WBC # BLD AUTO: 7.27 THOUSAND/UL (ref 4.31–10.16)

## 2023-03-05 DIAGNOSIS — E78.5 HYPERLIPIDEMIA ASSOCIATED WITH TYPE 2 DIABETES MELLITUS (HCC): ICD-10-CM

## 2023-03-05 DIAGNOSIS — I73.9 CLAUDICATION IN PERIPHERAL VASCULAR DISEASE (HCC): ICD-10-CM

## 2023-03-05 DIAGNOSIS — Z86.73 HISTORY OF CVA (CEREBROVASCULAR ACCIDENT): ICD-10-CM

## 2023-03-05 DIAGNOSIS — I65.1 BASILAR ARTERY STENOSIS: ICD-10-CM

## 2023-03-05 DIAGNOSIS — R60.9 DEPENDENT EDEMA: ICD-10-CM

## 2023-03-05 DIAGNOSIS — R21 RASH: ICD-10-CM

## 2023-03-05 DIAGNOSIS — E11.69 HYPERLIPIDEMIA ASSOCIATED WITH TYPE 2 DIABETES MELLITUS (HCC): ICD-10-CM

## 2023-03-05 DIAGNOSIS — I10 HYPERTENSION, UNSPECIFIED TYPE: ICD-10-CM

## 2023-03-05 DIAGNOSIS — I15.2 HYPERTENSION ASSOCIATED WITH DIABETES (HCC): ICD-10-CM

## 2023-03-05 DIAGNOSIS — I70.0 AORTOILIAC STENOSIS, LEFT (HCC): ICD-10-CM

## 2023-03-05 DIAGNOSIS — E11.59 HYPERTENSION ASSOCIATED WITH DIABETES (HCC): ICD-10-CM

## 2023-03-06 RX ORDER — CLONIDINE 0.3 MG/24H
1 PATCH, EXTENDED RELEASE TRANSDERMAL WEEKLY
Qty: 12 PATCH | Refills: 3 | Status: SHIPPED | OUTPATIENT
Start: 2023-03-06

## 2023-04-18 PROBLEM — Z71.2 ENCOUNTER TO DISCUSS TEST RESULTS: Status: RESOLVED | Noted: 2022-09-12 | Resolved: 2023-04-18

## 2023-04-18 PROBLEM — R94.31 ABNORMAL EKG: Status: RESOLVED | Noted: 2019-01-22 | Resolved: 2023-04-18

## 2023-04-30 DIAGNOSIS — I15.2 HYPERTENSION ASSOCIATED WITH DIABETES (HCC): ICD-10-CM

## 2023-04-30 DIAGNOSIS — R21 RASH: ICD-10-CM

## 2023-04-30 DIAGNOSIS — I73.9 CLAUDICATION IN PERIPHERAL VASCULAR DISEASE (HCC): ICD-10-CM

## 2023-04-30 DIAGNOSIS — I10 HYPERTENSION, UNSPECIFIED TYPE: ICD-10-CM

## 2023-04-30 DIAGNOSIS — E78.5 HYPERLIPIDEMIA ASSOCIATED WITH TYPE 2 DIABETES MELLITUS (HCC): ICD-10-CM

## 2023-04-30 DIAGNOSIS — E11.69 HYPERLIPIDEMIA ASSOCIATED WITH TYPE 2 DIABETES MELLITUS (HCC): ICD-10-CM

## 2023-04-30 DIAGNOSIS — Z86.73 HISTORY OF CVA (CEREBROVASCULAR ACCIDENT): ICD-10-CM

## 2023-04-30 DIAGNOSIS — E11.59 HYPERTENSION ASSOCIATED WITH DIABETES (HCC): ICD-10-CM

## 2023-04-30 DIAGNOSIS — I70.0 AORTOILIAC STENOSIS, LEFT (HCC): ICD-10-CM

## 2023-04-30 DIAGNOSIS — R60.9 DEPENDENT EDEMA: ICD-10-CM

## 2023-04-30 DIAGNOSIS — I65.1 BASILAR ARTERY STENOSIS: ICD-10-CM

## 2023-05-01 RX ORDER — CLONIDINE 0.3 MG/24H
1 PATCH, EXTENDED RELEASE TRANSDERMAL WEEKLY
Qty: 12 PATCH | Refills: 1 | Status: SHIPPED | OUTPATIENT
Start: 2023-05-01

## 2023-05-04 DIAGNOSIS — I70.0 AORTOILIAC STENOSIS, LEFT (HCC): ICD-10-CM

## 2023-05-04 DIAGNOSIS — I73.9 CLAUDICATION IN PERIPHERAL VASCULAR DISEASE (HCC): ICD-10-CM

## 2023-05-04 DIAGNOSIS — E11.59 HYPERTENSION ASSOCIATED WITH DIABETES (HCC): ICD-10-CM

## 2023-05-04 DIAGNOSIS — E11.69 HYPERLIPIDEMIA ASSOCIATED WITH TYPE 2 DIABETES MELLITUS (HCC): ICD-10-CM

## 2023-05-04 DIAGNOSIS — I65.1 BASILAR ARTERY STENOSIS: ICD-10-CM

## 2023-05-04 DIAGNOSIS — I15.2 HYPERTENSION ASSOCIATED WITH DIABETES (HCC): ICD-10-CM

## 2023-05-04 DIAGNOSIS — E78.5 HYPERLIPIDEMIA ASSOCIATED WITH TYPE 2 DIABETES MELLITUS (HCC): ICD-10-CM

## 2023-05-04 RX ORDER — AMLODIPINE BESYLATE 10 MG/1
10 TABLET ORAL DAILY
Qty: 90 TABLET | Refills: 1 | Status: SHIPPED | OUTPATIENT
Start: 2023-05-04 | End: 2023-08-02

## 2023-05-08 ENCOUNTER — TELEPHONE (OUTPATIENT)
Dept: WOUND CARE | Facility: HOSPITAL | Age: 78
End: 2023-05-08

## 2023-05-08 NOTE — TELEPHONE ENCOUNTER
Patient arrived for wounds on b/l buttocks, upon assessment the patient has no open wounds  Recommend use of Remedy Hydraguard and obtain a gel cushion online  The patient and family verbalized understanding and thanks

## 2023-05-09 NOTE — PATIENT INSTRUCTIONS
1  Hypertension- Not at goal  Chlorthalidone and Losartan have been on hold since hospitalization due to RICARDO  Creatinine is stable  Please re-stat these medications  2  Chronic kidney disease- Followed by nephrology, Dr Javon Lake  3  Recent Covid infection- Doing well  4  Pressure ulcer on buttocks- Will have wound care at home  Follow up as scheduled  No excercise/No heavy lifting/No sports/gym

## 2023-05-15 DIAGNOSIS — I73.9 CLAUDICATION IN PERIPHERAL VASCULAR DISEASE (HCC): ICD-10-CM

## 2023-05-15 DIAGNOSIS — E11.69 HYPERLIPIDEMIA ASSOCIATED WITH TYPE 2 DIABETES MELLITUS (HCC): ICD-10-CM

## 2023-05-15 DIAGNOSIS — E78.5 HYPERLIPIDEMIA ASSOCIATED WITH TYPE 2 DIABETES MELLITUS (HCC): ICD-10-CM

## 2023-05-15 DIAGNOSIS — I15.2 HYPERTENSION ASSOCIATED WITH DIABETES (HCC): ICD-10-CM

## 2023-05-15 DIAGNOSIS — I70.0 AORTOILIAC STENOSIS, LEFT (HCC): ICD-10-CM

## 2023-05-15 DIAGNOSIS — I65.1 BASILAR ARTERY STENOSIS: ICD-10-CM

## 2023-05-15 DIAGNOSIS — E11.59 HYPERTENSION ASSOCIATED WITH DIABETES (HCC): ICD-10-CM

## 2023-05-15 DIAGNOSIS — M10.272 ACUTE DRUG-INDUCED GOUT OF LEFT FOOT: ICD-10-CM

## 2023-05-15 RX ORDER — LABETALOL 300 MG/1
300 TABLET, FILM COATED ORAL 2 TIMES DAILY
Qty: 180 TABLET | Refills: 1 | Status: SHIPPED | OUTPATIENT
Start: 2023-05-15

## 2023-05-15 RX ORDER — AMLODIPINE BESYLATE 10 MG/1
10 TABLET ORAL DAILY
Qty: 90 TABLET | Refills: 1 | Status: SHIPPED | OUTPATIENT
Start: 2023-05-15

## 2023-05-30 ENCOUNTER — TELEPHONE (OUTPATIENT)
Dept: LAB | Facility: HOSPITAL | Age: 78
End: 2023-05-30

## 2023-06-14 ENCOUNTER — APPOINTMENT (OUTPATIENT)
Dept: LAB | Facility: HOSPITAL | Age: 78
End: 2023-06-14
Payer: COMMERCIAL

## 2023-06-14 DIAGNOSIS — E78.5 HYPERLIPIDEMIA ASSOCIATED WITH TYPE 2 DIABETES MELLITUS (HCC): ICD-10-CM

## 2023-06-14 DIAGNOSIS — E11.21 TYPE 2 DIABETES MELLITUS WITH DIABETIC NEPHROPATHY, WITHOUT LONG-TERM CURRENT USE OF INSULIN (HCC): ICD-10-CM

## 2023-06-14 DIAGNOSIS — I15.2 HYPERTENSION ASSOCIATED WITH DIABETES (HCC): ICD-10-CM

## 2023-06-14 DIAGNOSIS — E11.59 HYPERTENSION ASSOCIATED WITH DIABETES (HCC): ICD-10-CM

## 2023-06-14 DIAGNOSIS — E11.69 HYPERLIPIDEMIA ASSOCIATED WITH TYPE 2 DIABETES MELLITUS (HCC): ICD-10-CM

## 2023-06-14 LAB
ALBUMIN SERPL BCP-MCNC: 3.5 G/DL (ref 3.5–5)
ALP SERPL-CCNC: 98 U/L (ref 46–116)
ALT SERPL W P-5'-P-CCNC: 21 U/L (ref 12–78)
ANION GAP SERPL CALCULATED.3IONS-SCNC: 4 MMOL/L (ref 4–13)
AST SERPL W P-5'-P-CCNC: 11 U/L (ref 5–45)
BASOPHILS # BLD AUTO: 0.05 THOUSANDS/ÂΜL (ref 0–0.1)
BASOPHILS NFR BLD AUTO: 1 % (ref 0–1)
BILIRUB SERPL-MCNC: 0.46 MG/DL (ref 0.2–1)
BUN SERPL-MCNC: 29 MG/DL (ref 5–25)
CALCIUM SERPL-MCNC: 9.9 MG/DL (ref 8.3–10.1)
CHLORIDE SERPL-SCNC: 109 MMOL/L (ref 96–108)
CHOLEST SERPL-MCNC: 345 MG/DL
CO2 SERPL-SCNC: 26 MMOL/L (ref 21–32)
CREAT SERPL-MCNC: 1.69 MG/DL (ref 0.6–1.3)
EOSINOPHIL # BLD AUTO: 0.3 THOUSAND/ÂΜL (ref 0–0.61)
EOSINOPHIL NFR BLD AUTO: 5 % (ref 0–6)
ERYTHROCYTE [DISTWIDTH] IN BLOOD BY AUTOMATED COUNT: 13.1 % (ref 11.6–15.1)
GFR SERPL CREATININE-BSD FRML MDRD: 29 ML/MIN/1.73SQ M
GLUCOSE P FAST SERPL-MCNC: 236 MG/DL (ref 65–99)
HCT VFR BLD AUTO: 44.3 % (ref 34.8–46.1)
HDLC SERPL-MCNC: 39 MG/DL
HGB BLD-MCNC: 14.6 G/DL (ref 11.5–15.4)
IMM GRANULOCYTES # BLD AUTO: 0.06 THOUSAND/UL (ref 0–0.2)
IMM GRANULOCYTES NFR BLD AUTO: 1 % (ref 0–2)
LDLC SERPL CALC-MCNC: 248 MG/DL (ref 0–100)
LYMPHOCYTES # BLD AUTO: 1.73 THOUSANDS/ÂΜL (ref 0.6–4.47)
LYMPHOCYTES NFR BLD AUTO: 27 % (ref 14–44)
MCH RBC QN AUTO: 29.4 PG (ref 26.8–34.3)
MCHC RBC AUTO-ENTMCNC: 33 G/DL (ref 31.4–37.4)
MCV RBC AUTO: 89 FL (ref 82–98)
MONOCYTES # BLD AUTO: 0.6 THOUSAND/ÂΜL (ref 0.17–1.22)
MONOCYTES NFR BLD AUTO: 9 % (ref 4–12)
NEUTROPHILS # BLD AUTO: 3.65 THOUSANDS/ÂΜL (ref 1.85–7.62)
NEUTS SEG NFR BLD AUTO: 57 % (ref 43–75)
NRBC BLD AUTO-RTO: 0 /100 WBCS
PLATELET # BLD AUTO: 226 THOUSANDS/UL (ref 149–390)
PMV BLD AUTO: 11.1 FL (ref 8.9–12.7)
POTASSIUM SERPL-SCNC: 4 MMOL/L (ref 3.5–5.3)
PROT SERPL-MCNC: 7.2 G/DL (ref 6.4–8.4)
RBC # BLD AUTO: 4.96 MILLION/UL (ref 3.81–5.12)
SODIUM SERPL-SCNC: 139 MMOL/L (ref 135–147)
TRIGL SERPL-MCNC: 291 MG/DL
TSH SERPL DL<=0.05 MIU/L-ACNC: 3.16 UIU/ML (ref 0.45–4.5)
WBC # BLD AUTO: 6.39 THOUSAND/UL (ref 4.31–10.16)

## 2023-06-14 PROCEDURE — 83036 HEMOGLOBIN GLYCOSYLATED A1C: CPT

## 2023-06-14 PROCEDURE — 80061 LIPID PANEL: CPT

## 2023-06-14 PROCEDURE — 84443 ASSAY THYROID STIM HORMONE: CPT

## 2023-06-14 PROCEDURE — 80053 COMPREHEN METABOLIC PANEL: CPT

## 2023-06-14 PROCEDURE — 36415 COLL VENOUS BLD VENIPUNCTURE: CPT

## 2023-06-14 PROCEDURE — 85025 COMPLETE CBC W/AUTO DIFF WBC: CPT

## 2023-06-15 LAB
EST. AVERAGE GLUCOSE BLD GHB EST-MCNC: 249 MG/DL
HBA1C MFR BLD: 10.3 %

## 2023-06-26 ENCOUNTER — OFFICE VISIT (OUTPATIENT)
Dept: INTERNAL MEDICINE CLINIC | Facility: CLINIC | Age: 78
End: 2023-06-26
Payer: COMMERCIAL

## 2023-06-26 VITALS
WEIGHT: 165.8 LBS | TEMPERATURE: 99.2 F | RESPIRATION RATE: 16 BRPM | SYSTOLIC BLOOD PRESSURE: 144 MMHG | HEART RATE: 75 BPM | DIASTOLIC BLOOD PRESSURE: 86 MMHG | BODY MASS INDEX: 34.8 KG/M2 | HEIGHT: 58 IN | OXYGEN SATURATION: 98 %

## 2023-06-26 DIAGNOSIS — M51.26 LUMBAR DISC HERNIATION: ICD-10-CM

## 2023-06-26 DIAGNOSIS — I24.0 RIGHT CORONARY ARTERY OCCLUSION (HCC): ICD-10-CM

## 2023-06-26 DIAGNOSIS — I74.09 AORTOILIAC OCCLUSIVE DISEASE (HCC): ICD-10-CM

## 2023-06-26 DIAGNOSIS — E11.69 HYPERLIPIDEMIA ASSOCIATED WITH TYPE 2 DIABETES MELLITUS (HCC): ICD-10-CM

## 2023-06-26 DIAGNOSIS — I25.10 STENOSIS OF LEFT ANTERIOR DESCENDING ARTERY: ICD-10-CM

## 2023-06-26 DIAGNOSIS — Z00.00 MEDICARE ANNUAL WELLNESS VISIT, SUBSEQUENT: Primary | ICD-10-CM

## 2023-06-26 DIAGNOSIS — E78.5 HYPERLIPIDEMIA ASSOCIATED WITH TYPE 2 DIABETES MELLITUS (HCC): ICD-10-CM

## 2023-06-26 DIAGNOSIS — E11.21 TYPE 2 DIABETES MELLITUS WITH DIABETIC NEPHROPATHY, WITHOUT LONG-TERM CURRENT USE OF INSULIN (HCC): ICD-10-CM

## 2023-06-26 DIAGNOSIS — K55.1 MESENTERIC ARTERY STENOSIS (HCC): ICD-10-CM

## 2023-06-26 DIAGNOSIS — I10 PRIMARY HYPERTENSION: ICD-10-CM

## 2023-06-26 DIAGNOSIS — I63.9 CEREBROVASCULAR ACCIDENT (CVA), UNSPECIFIED MECHANISM (HCC): ICD-10-CM

## 2023-06-26 DIAGNOSIS — K21.9 CHRONIC GERD: ICD-10-CM

## 2023-06-26 DIAGNOSIS — S31.809A WOUND OF GLUTEAL CLEFT, UNSPECIFIED LATERALITY, INITIAL ENCOUNTER: ICD-10-CM

## 2023-06-26 DIAGNOSIS — C67.8 MALIGNANT NEOPLASM OF OVERLAPPING SITES OF BLADDER (HCC): ICD-10-CM

## 2023-06-26 DIAGNOSIS — C49.A0 GASTROINTESTINAL STROMAL TUMOR (GIST) (HCC): ICD-10-CM

## 2023-06-26 PROBLEM — R09.89 ABDOMINAL BRUIT: Status: RESOLVED | Noted: 2020-01-22 | Resolved: 2023-06-26

## 2023-06-26 PROBLEM — N80.9 ENDOMETRIOSIS: Status: RESOLVED | Noted: 2017-03-29 | Resolved: 2023-06-26

## 2023-06-26 PROBLEM — Z28.21 IMMUNIZATION NOT CARRIED OUT BECAUSE OF PATIENT REFUSAL: Status: RESOLVED | Noted: 2019-10-22 | Resolved: 2023-06-26

## 2023-06-26 PROBLEM — N17.9 AKI (ACUTE KIDNEY INJURY) (HCC): Status: RESOLVED | Noted: 2022-01-22 | Resolved: 2023-06-26

## 2023-06-26 PROBLEM — R80.8 OTHER PROTEINURIA: Status: RESOLVED | Noted: 2019-07-01 | Resolved: 2023-06-26

## 2023-06-26 PROCEDURE — G0439 PPPS, SUBSEQ VISIT: HCPCS | Performed by: INTERNAL MEDICINE

## 2023-06-26 PROCEDURE — 99214 OFFICE O/P EST MOD 30 MIN: CPT | Performed by: INTERNAL MEDICINE

## 2023-06-26 RX ORDER — ZINC OXIDE 20 %
OINTMENT (GRAM) TOPICAL AS NEEDED
Qty: 56.7 G | Refills: 0 | Status: SHIPPED | OUTPATIENT
Start: 2023-06-26

## 2023-06-26 NOTE — PATIENT INSTRUCTIONS
Medicare Preventive Visit Patient Instructions  Thank you for completing your Welcome to Medicare Visit or Medicare Annual Wellness Visit today  Your next wellness visit will be due in one year (6/26/2024)  The screening/preventive services that you may require over the next 5-10 years are detailed below  Some tests may not apply to you based off risk factors and/or age  Screening tests ordered at today's visit but not completed yet may show as past due  Also, please note that scanned in results may not display below  Preventive Screenings:  Service Recommendations Previous Testing/Comments   Colorectal Cancer Screening  * Colonoscopy    * Fecal Occult Blood Test (FOBT)/Fecal Immunochemical Test (FIT)  * Fecal DNA/Cologuard Test  * Flexible Sigmoidoscopy Age: 39-70 years old   Colonoscopy: every 10 years (may be performed more frequently if at higher risk)  OR  FOBT/FIT: every 1 year  OR  Cologuard: every 3 years  OR  Sigmoidoscopy: every 5 years  Screening may be recommended earlier than age 39 if at higher risk for colorectal cancer  Also, an individualized decision between you and your healthcare provider will decide whether screening between the ages of 74-80 would be appropriate  Colonoscopy: 11/19/2019  FOBT/FIT: Not on file  Cologuard: Not on file  Sigmoidoscopy: Not on file          Breast Cancer Screening Age: 36 years old  Frequency: every 1-2 years  Not required if history of left and right mastectomy Mammogram: Not on file        Cervical Cancer Screening Between the ages of 21-29, pap smear recommended once every 3 years  Between the ages of 33-67, can perform pap smear with HPV co-testing every 5 years     Recommendations may differ for women with a history of total hysterectomy, cervical cancer, or abnormal pap smears in past  Pap Smear: Not on file    Screening Not Indicated   Hepatitis C Screening Once for adults born between [facility]  More frequently in patients at high risk for Hepatitis C Hep C Antibody: 02/04/2020    Screening Current   Diabetes Screening 1-2 times per year if you're at risk for diabetes or have pre-diabetes Fasting glucose: 236 mg/dL (6/14/2023)  A1C: 10 3 % (6/14/2023)  Screening Not Indicated  History Diabetes   Cholesterol Screening Once every 5 years if you don't have a lipid disorder  May order more often based on risk factors  Lipid panel: 06/14/2023    Screening Not Indicated  History Lipid Disorder     Other Preventive Screenings Covered by Medicare:  1  Abdominal Aortic Aneurysm (AAA) Screening: covered once if your at risk  You're considered to be at risk if you have a family history of AAA  2  Lung Cancer Screening: covers low dose CT scan once per year if you meet all of the following conditions: (1) Age 50-69; (2) No signs or symptoms of lung cancer; (3) Current smoker or have quit smoking within the last 15 years; (4) You have a tobacco smoking history of at least 20 pack years (packs per day multiplied by number of years you smoked); (5) You get a written order from a healthcare provider  3  Glaucoma Screening: covered annually if you're considered high risk: (1) You have diabetes OR (2) Family history of glaucoma OR (3)  aged 48 and older OR (3)  American aged 72 and older  3  Osteoporosis Screening: covered every 2 years if you meet one of the following conditions: (1) You're estrogen deficient and at risk for osteoporosis based off medical history and other findings; (2) Have a vertebral abnormality; (3) On glucocorticoid therapy for more than 3 months; (4) Have primary hyperparathyroidism; (5) On osteoporosis medications and need to assess response to drug therapy  · Last bone density test (DXA Scan): Not on file  5  HIV Screening: covered annually if you're between the age of 12-76  Also covered annually if you are younger than 13 and older than 72 with risk factors for HIV infection   For pregnant patients, it is covered up to 3 times per pregnancy  Immunizations:  Immunization Recommendations   Influenza Vaccine Annual influenza vaccination during flu season is recommended for all persons aged >= 6 months who do not have contraindications   Pneumococcal Vaccine   * Pneumococcal conjugate vaccine = PCV13 (Prevnar 13), PCV15 (Vaxneuvance), PCV20 (Prevnar 20)  * Pneumococcal polysaccharide vaccine = PPSV23 (Pneumovax) Adults 25-60 years old: 1-3 doses may be recommended based on certain risk factors  Adults 72 years old: 1-2 doses may be recommended based off what pneumonia vaccine you previously received   Hepatitis B Vaccine 3 dose series if at intermediate or high risk (ex: diabetes, end stage renal disease, liver disease)   Tetanus (Td) Vaccine - COST NOT COVERED BY MEDICARE PART B Following completion of primary series, a booster dose should be given every 10 years to maintain immunity against tetanus  Td may also be given as tetanus wound prophylaxis  Tdap Vaccine - COST NOT COVERED BY MEDICARE PART B Recommended at least once for all adults  For pregnant patients, recommended with each pregnancy  Shingles Vaccine (Shingrix) - COST NOT COVERED BY MEDICARE PART B  2 shot series recommended in those aged 48 and above     Health Maintenance Due:      Topic Date Due   • Lung Cancer Screening  08/25/2022   • Breast Cancer Screening: Mammogram  01/27/2024 (Originally 6/15/1985)   • Hepatitis C Screening  Completed   • Colorectal Cancer Screening  Discontinued     Immunizations Due:      Topic Date Due   • COVID-19 Vaccine (3 - Pfizer series) 06/26/2021   • Influenza Vaccine (Season Ended) 09/01/2023     Advance Directives   What are advance directives? Advance directives are legal documents that state your wishes and plans for medical care  These plans are made ahead of time in case you lose your ability to make decisions for yourself   Advance directives can apply to any medical decision, such as the treatments you want, and if you want to donate organs  What are the types of advance directives? There are many types of advance directives, and each state has rules about how to use them  You may choose a combination of any of the following:  · Living will: This is a written record of the treatment you want  You can also choose which treatments you do not want, which to limit, and which to stop at a certain time  This includes surgery, medicine, IV fluid, and tube feedings  · Durable power of  for healthcare [facility]): This is a written record that states who you want to make healthcare choices for you when you are unable to make them for yourself  This person, called a proxy, is usually a family member or a friend  You may choose more than 1 proxy  · Do not resuscitate (DNR) order:  A DNR order is used in case your heart stops beating or you stop breathing  It is a request not to have certain forms of treatment, such as CPR  A DNR order may be included in other types of advance directives  · Medical directive: This covers the care that you want if you are in a coma, near death, or unable to make decisions for yourself  You can list the treatments you want for each condition  Treatment may include pain medicine, surgery, blood transfusions, dialysis, IV or tube feedings, and a ventilator (breathing machine)  · Values history: This document has questions about your views, beliefs, and how you feel and think about life  This information can help others choose the care that you would choose  Why are advance directives important? An advance directive helps you control your care  Although spoken wishes may be used, it is better to have your wishes written down  Spoken wishes can be misunderstood, or not followed  Treatments may be given even if you do not want them  An advance directive may make it easier for your family to make difficult choices about your care     Weight Management   Why it is important to manage your weight:  Being overweight increases your risk of health conditions such as heart disease, high blood pressure, type 2 diabetes, and certain types of cancer  It can also increase your risk for osteoarthritis, sleep apnea, and other respiratory problems  Aim for a slow, steady weight loss  Even a small amount of weight loss can lower your risk of health problems  How to lose weight safely:  A safe and healthy way to lose weight is to eat fewer calories and get regular exercise  You can lose up about 1 pound a week by decreasing the number of calories you eat by 500 calories each day  Healthy meal plan for weight management:  A healthy meal plan includes a variety of foods, contains fewer calories, and helps you stay healthy  A healthy meal plan includes the following:  · Eat whole-grain foods more often  A healthy meal plan should contain fiber  Fiber is the part of grains, fruits, and vegetables that is not broken down by your body  Whole-grain foods are healthy and provide extra fiber in your diet  Some examples of whole-grain foods are whole-wheat breads and pastas, oatmeal, brown rice, and bulgur  · Eat a variety of vegetables every day  Include dark, leafy greens such as spinach, kale, harrison greens, and mustard greens  Eat yellow and orange vegetables such as carrots, sweet potatoes, and winter squash  · Eat a variety of fruits every day  Choose fresh or canned fruit (canned in its own juice or light syrup) instead of juice  Fruit juice has very little or no fiber  · Eat low-fat dairy foods  Drink fat-free (skim) milk or 1% milk  Eat fat-free yogurt and low-fat cottage cheese  Try low-fat cheeses such as mozzarella and other reduced-fat cheeses  · Choose meat and other protein foods that are low in fat  Choose beans or other legumes such as split peas or lentils  Choose fish, skinless poultry (chicken or turkey), or lean cuts of red meat (beef or pork)   Before you cook meat or poultry, cut off any visible fat  · Use less fat and oil  Try baking foods instead of frying them  Add less fat, such as margarine, sour cream, regular salad dressing and mayonnaise to foods  Eat fewer high-fat foods  Some examples of high-fat foods include french fries, doughnuts, ice cream, and cakes  · Eat fewer sweets  Limit foods and drinks that are high in sugar  This includes candy, cookies, regular soda, and sweetened drinks  Exercise:  Exercise at least 30 minutes per day on most days of the week  Some examples of exercise include walking, biking, dancing, and swimming  You can also fit in more physical activity by taking the stairs instead of the elevator or parking farther away from stores  Ask your healthcare provider about the best exercise plan for you  © Copyright [employer] 2018 Information is for End User's use only and may not be sold, redistributed or otherwise used for commercial purposes   All illustrations and images included in CareNotes® are the copyrighted property of [employer]  or [address] [place] [address]

## 2023-06-26 NOTE — PROGRESS NOTES
Assessment and Plan:     Problem List Items Addressed This Visit        Digestive    Chronic GERD    Mesenteric artery stenosis (HCC)    Gastrointestinal stromal tumor (GIST) ([facility] )       Endocrine    Type 2 diabetes mellitus with diabetic nephropathy (HCC)    Relevant Orders    CBC and differential    Albumin / creatinine urine ratio    Comprehensive metabolic panel    Hemoglobin A1C    Hyperlipidemia associated with type 2 diabetes mellitus ([facility] )    Relevant Orders    Lipid Panel with Direct LDL reflex       Cardiovascular and Mediastinum    Cerebrovascular accident (CVA) ([facility] )    Hypertension    Relevant Orders    Comprehensive metabolic panel    Hemoglobin A1C    Stenosis of left anterior descending artery Mid LAD 50-60% stenosis    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    Aortoiliac occlusive disease (HCC)       Musculoskeletal and Integument    Lumbar disc herniation       Genitourinary    Malignant neoplasm of overlapping sites of bladder ([facility] )   Other Visit Diagnoses     Medicare annual wellness visit, subsequent    -  Primary    Wound of gluteal cleft, unspecified laterality, initial encounter        Relevant Medications    zinc oxide 20 % ointment        BMI Counseling: Body mass index is 34 65 kg/m²  The BMI is above normal  Nutrition recommendations include decreasing portion sizes and encouraging healthy choices of fruits and vegetables  Exercise recommendations include moderate physical activity 150 minutes/week  Rationale for BMI follow-up plan is due to patient being overweight or obese  Preventive health issues were discussed with patient, and age appropriate screening tests were ordered as noted in patient's After Visit Summary  Personalized health advice and appropriate referrals for health education or preventive services given if needed, as noted in patient's After Visit Summary       History of Present Illness:     Patient presents for a Medicare Wellness Visit    Vishnu Youngblood is here with her son for AWV; reports she is in a lot of pain r/t stenoses, does not want to take insulin or try any PO med's for her DM; says she is 70 and ready to die when she is ready to; not interested in nightly lantus; is oriented and understands that she is increasing her risks; she is taking her other medication for her BP; taking Plavix; Will plan on seeing each other in 3 months; refusing endocrinology  Patient Care Team:  Ezequiel Mckeon MD as PCP - General (Internal Medicine)  Rod Hyde MD as Neurologist  Boone Plata MD (Urology)  Kyler Aden MD as Cardiologist (Cardiology)  Adele Etienne MD (Gastroenterology)     Review of Systems:     Review of Systems   Constitutional: Negative for chills and fever  HENT: Negative for ear pain and sore throat  Eyes: Negative for pain and visual disturbance  Respiratory: Negative for cough and shortness of breath  Cardiovascular: Negative for chest pain and palpitations  Gastrointestinal: Negative for abdominal pain and vomiting  Genitourinary: Negative for dysuria and hematuria  Musculoskeletal: Positive for arthralgias, back pain, gait problem and myalgias  Skin: Negative for color change and rash  Neurological: Negative for seizures and syncope  Psychiatric/Behavioral: Positive for decreased concentration and dysphoric mood  The patient is nervous/anxious  All other systems reviewed and are negative         Problem List:     Patient Active Problem List   Diagnosis   • Spinal stenosis   • Basilar artery stenosis   • Breast mass   • Cerebrovascular accident (CVA) ([facility] )   • Chronic GERD   • Stage 3 chronic kidney disease ([facility] )   • Claudication in peripheral vascular disease ([facility] )   • Type 2 diabetes mellitus with diabetic nephropathy (HCC)   • Gout   • Hx-TIA (transient ischemic attack)   • Hypercholesteremia   • Hyperlipidemia associated with type 2 diabetes mellitus ([facility] ) • Hypertension   • Hypertension associated with diabetes ([facility] )   • Osteoarthritis   • Obesity (BMI 30-39  9)   • Polyneuropathy   • Right renal artery stenosis (HCC)   • Vertebrobasilar artery syndrome   • Vitamin D deficiency   • Statin intolerance   • Lumbar disc herniation   • Pain of left lower extremity   • Spondylosis of lumbar region without myelopathy or radiculopathy   • Aortoiliac stenosis, left (HCC)   • Decreased pulses in feet   • Lumbosacral spondylosis without myelopathy   • Neurodermatitis   • Obesity with body mass index 30 or greater   • Hyperlipidemia, unspecified   • Herniated lumbar intervertebral disc   • Atherosclerosis of renal artery (HCC)   • Celiac artery stenosis (HCC) >70% stenosis in the celiac trunk   • Abnormal CT of the chest suspicious for an infectious or inflammatory bronchiolitis  • Positive cardiac stress test  small, mildly severe, partially reversible myocardial perfusion defect of anterior and inferior wall    • Femoral artery stenosis, right (HCC) 50-75% stenosis in the common femoral artery     • Mesenteric artery stenosis (HCC)   • Median arcuate ligament syndrome (HCC)   • Hypertensive kidney disease with stage 3 chronic kidney disease (HCC)   • Atherosclerosis of native arteries of extremities with intermittent claudication, bilateral legs (HCC)   • Asymptomatic bilateral carotid artery stenosis   • Pulmonary nodule 7 mm groundglass opacity in the right upper lobe, unchanged since October 2019   • Stenosis of left anterior descending artery Mid LAD 50-60% stenosis   • 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   • Malignant neoplasm of overlapping sites of bladder [facility])   • Cervical radiculopathy   • Stage 4 chronic kidney disease (HCC)   • Hypertensive kidney disease with stage 4 chronic kidney disease (HCC)   • Embolism and 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)   • Depression, recurrent (HCC)   • Obesity, morbid ([place] [address] )   • Type 2 diabetes mellitus with chronic kidney disease ([place] [address] )   • Type 2 diabetes mellitus with diabetic peripheral angiopathy without gangrene ([place] [address] )   • Gastrointestinal stromal tumor (GIST) ([place] [address] )   • History of gastrointestinal stromal tumor (GIST)   • Secondary hyperparathyroidism of renal origin [facility])   • Aortoiliac occlusive disease ([place] [address] )      Past Medical and Surgical History:     Past Medical History:   Diagnosis Date   • Arthritis    • Chronic kidney disease    • Endometriosis    • High cholesterol    • Hypertension    • Kidney disease, chronic, stage III (moderate, EGFR 30-59 ml/min) ([facility])    • Lumbar disc herniation    • Neuropathy    • Peripheral vascular disease ([facility])    • Pneumonia    • Shoulder injury     left   • Spinal stenosis    • Stroke ([place] [address] ) 2015    Memory loss     Past Surgical History:   Procedure Laterality Date   • CARDIAC CATHETERIZATION     • COLONOSCOPY     • FL RETROGRADE PYELOGRAM  4/6/2020   • FRACTURE SURGERY Right     ankle   • OVARIAN CYST SURGERY     • GA CYSTO BLADDER W/URETERAL CATHETERIZATION Bilateral 4/6/2020    Procedure: CYSTOSCOPY WITH RETROGRADE PYELOGRAM;  Surgeon: Julia Gee MD;  [facility];  Service: Urology   • GA CYSTO W/INSERT URETERAL STENT Right 4/6/2020    Procedure: INSERTION STENT URETERAL;  Surgeon: Julia Gee MD;  [facility];  Service: Urology   • GA CYSTO W/REMOVAL OF TUMORS SMALL N/A 4/6/2020    Procedure: TRANSURETHRAL RESECTION OF BLADDER TUMOR (TURBT);   Surgeon: Julia Gee MD;  [facility];  Service: Urology   • ROTATOR CUFF REPAIR Left    • TONSILLECTOMY        Family History:     Family History   Problem Relation Age of Onset   • Hypertension Mother    • Heart disease Mother         Valvular   • Hyperlipidemia Mother    • Hypertension Father    • Heart defect Father         Cardiomegaly   • Stroke Sister         Cerebrovascular Accident   • Arthritis Brother    • Other Brother         Back Disorder      Social History:     Social History     Socioeconomic History   • Marital status: /Civil Union     Spouse name: None   • Number of children: None   • Years of education: None   • Highest education level: None   Occupational History   • Occupation: retired   Tobacco Use   • Smoking status: Former     Packs/day: 2 00     Years: 40 00     Total pack years: 80 00     Types: Cigarettes     Start date:      Quit date: 2020     Years since quittin 9   • Smokeless tobacco: Never   Vaping Use   • Vaping Use: Never used   Substance and Sexual Activity   • Alcohol use: Never   • Drug use: Never   • Sexual activity: Not Currently     Partners: Male   Other Topics Concern   • None   Social History Narrative    Occasional Caffeine Consumption     Social Determinants of Health     Financial Resource Strain: Not on file   Food Insecurity: No Food Insecurity (2022)    Hunger Vital Sign    • Worried About Running Out of Food in the Last Year: Never true    • Ran Out of Food in the Last Year: Never true   Transportation Needs: No Transportation Needs (2022)    PRAPARE - Transportation    • Lack of Transportation (Medical): No    • Lack of Transportation (Non-Medical):  No   Physical Activity: Not on file   Stress: Not on file   Social Connections: Not on file   Intimate Partner Violence: Not on file   Housing Stability: Low Risk  (2022)    Housing Stability Vital Sign    • Unable to Pay for Housing in the Last Year: No    • Number of Places Lived in the Last Year: 1    • Unstable Housing in the Last Year: No      Medications and Allergies:     Current Outpatient Medications   Medication Sig Dispense Refill   • allopurinol (ZYLOPRIM) 100 mg tablet TAKE 1 TABLET BY MOUTH EVERY DAY 90 tablet 2   • amLODIPine (NORVASC) 10 mg tablet Take 1 tablet (10 mg total) by mouth daily 90 tablet 1   • cloNIDine (CATAPRES-TTS-3) 0 3 mg/24 hr PLACE 1 PATCH (0 3 MG TOTAL) ON THE SKIN ONCE A WEEK 12 patch 1 • clopidogrel (PLAVIX) 75 mg tablet TAKE 1 TABLET BY MOUTH EVERY DAY 90 tablet 1   • Icosapent Ethyl (Vascepa) 1 g CAPS TAKE 2 CAPS BY MOUTH TWICE DAILY WITH FOOD 360 capsule 2   • labetalol (NORMODYNE) 300 mg tablet Take 1 tablet (300 mg total) by mouth 2 (two) times a day 180 tablet 1   • losartan (COZAAR) 100 MG tablet Take 1 tablet (100 mg total) by mouth daily 90 tablet 0   • zinc oxide 20 % ointment Apply topically as needed for irritation 56 7 g 0     No current facility-administered medications for this visit  Allergies   Allergen Reactions   • Fenofibrate Other (See Comments)      blood in urine  hx  Kidney Failure   • Colesevelam Other (See Comments)      leg pains   • Colestipol Itching and Other (See Comments)      Swelling lower legs   • Ezetimibe GI Intolerance   • Statins Myalgia      Immunizations:     Immunization History   Administered Date(s) Administered   • COVID-19 PFIZER VACCINE 0 3 ML IM 04/10/2021, 05/01/2021   • Pneumococcal Conjugate 13-Valent 09/21/2016   • Pneumococcal Polysaccharide PPV23 12/14/2011      Health Maintenance:         Topic Date Due   • Lung Cancer Screening  08/25/2022   • Breast Cancer Screening: Mammogram  01/27/2024 (Originally 6/15/1985)   • Hepatitis C Screening  Completed   • Colorectal Cancer Screening  Discontinued         Topic Date Due   • COVID-19 Vaccine (3 - Pfizer series) 06/26/2021   • Influenza Vaccine (Season Ended) 09/01/2023      Medicare Screening Tests and Risk Assessments:     Gena Leonard is here for her Subsequent Wellness visit  Last Medicare Wellness visit information reviewed, patient interviewed and updates made to the record today  Health Risk Assessment:   Patient rates overall health as fair  Patient feels that their physical health rating is slightly worse  Patient is dissatisfied with their life  Eyesight was rated as same  Hearing was rated as same  Patient feels that their emotional and mental health rating is same   Patients states they are sometimes angry  Patient states they are sometimes unusually tired/fatigued  Pain experienced in the last 7 days has been a lot  Patient's pain rating has been 6/10  Depression Screening:   PHQ-9 Score: 0      Fall Risk Screening: In the past year, patient has experienced: no history of falling in past year      Urinary Incontinence Screening:   Patient has not leaked urine accidently in the last six months  Home Safety:  Patient has trouble with stairs inside or outside of their home  Patient has working smoke alarms and has working carbon monoxide detector  Home safety hazards include: none  Nutrition:   Current diet is No Added Salt, Limited junk food, Regular and Low Saturated Fat  Medications:   Patient is currently taking over-the-counter supplements  OTC medications include: see medication list  Patient is able to manage medications  Activities of Daily Living (ADLs)/Instrumental Activities of Daily Living (IADLs):   Walk and transfer into and out of bed and chair?: Yes  Dress and groom yourself?: Yes    Bathe or shower yourself?: Yes    Feed yourself? Yes  Do your laundry/housekeeping?: Yes  Manage your money, pay your bills and track your expenses?: Yes  Make your own meals?: Yes    Do your own shopping?: Yes    ADL comments: Pt gets help from son and daughter in law       Previous Hospitalizations:   Any hospitalizations or ED visits within the last 12 months?: No      Advance Care Planning:   Living will: Yes    Advanced directive: Yes      Cognitive Screening:   Provider or family/friend/caregiver concerned regarding cognition?: No    PREVENTIVE SCREENINGS      Cardiovascular Screening:    General: History Lipid Disorder and Screening Current      Diabetes Screening:     General: History Diabetes and Screening Current      Colorectal Cancer Screening:     General: Screening Current and Screening Not Indicated      Breast Cancer Screening:     General: Screening Not "Indicated      Cervical Cancer Screening:    General: Screening Not Indicated      Osteoporosis Screening:    General: Screening Not Indicated      Abdominal Aortic Aneurysm (AAA) Screening:        General: Screening Not Indicated      Lung Cancer Screening:     General: Risks and Benefits Discussed, Screening Current and Patient Declines      Hepatitis C Screening:    General: Screening Current    Screening, Brief Intervention, and Referral to Treatment (SBIRT)    Screening  Typical number of drinks in a day: 0  Typical number of drinks in a week: 0  Interpretation: Low risk drinking behavior  AUDIT-C Screenin) How often did you have a drink containing alcohol in the past year? never  2) How many drinks did you have on a typical day when you were drinking in the past year? 0  3) How often did you have 6 or more drinks on one occasion in the past year? never    AUDIT-C Score: 0  Interpretation: Score 0-2 (female): Negative screen for alcohol misuse    Single Item Drug Screening:  How often have you used an illegal drug (including marijuana) or a prescription medication for non-medical reasons in the past year? never    Single Item Drug Screen Score: 0  Interpretation: Negative screen for possible drug use disorder    Brief Intervention  Alcohol & drug use screenings were reviewed  No concerns regarding substance use disorder identified  Other Counseling Topics:   Car/seat belt/driving safety, skin self-exam, sunscreen and calcium and vitamin D intake and regular weightbearing exercise  No results found  Physical Exam:     /86 (BP Location: Left arm, Patient Position: Sitting, Cuff Size: Adult)   Pulse 75   Temp 99 2 °F (37 3 °C) (Tympanic)   Resp 16   Ht 4' 10\" (1 473 m)   Wt 75 2 kg (165 lb 12 8 oz)   LMP  (LMP Unknown)   SpO2 98%   BMI 34 65 kg/m²     Physical Exam  Vitals and nursing note reviewed  Constitutional:       General: She is not in acute distress       Appearance: " Normal appearance  She is well-developed  She is obese  HENT:      Head: Normocephalic and atraumatic  Eyes:      Conjunctiva/sclera: Conjunctivae normal       Comments: Wearing glasses   Cardiovascular:      Rate and Rhythm: Normal rate and regular rhythm  Heart sounds: Normal heart sounds  No murmur heard  Pulmonary:      Effort: Pulmonary effort is normal  No respiratory distress  Breath sounds: Normal breath sounds  Abdominal:      Palpations: Abdomen is soft  Tenderness: There is no abdominal tenderness  Musculoskeletal:         General: No swelling  Normal range of motion  Cervical back: Normal range of motion and neck supple  Right lower leg: No edema  Left lower leg: No edema  Lymphadenopathy:      Cervical: No cervical adenopathy  Skin:     General: Skin is warm and dry  Capillary Refill: Capillary refill takes less than 2 seconds  Neurological:      General: No focal deficit present  Mental Status: She is alert and oriented to person, place, and time  Mental status is at baseline  Gait: Abnormal gait: using a walker     Psychiatric:         Mood and Affect: Mood normal           Denisha Palacio MD

## 2023-07-17 DIAGNOSIS — I10 HYPERTENSION, UNSPECIFIED TYPE: ICD-10-CM

## 2023-07-17 RX ORDER — LOSARTAN POTASSIUM 100 MG/1
100 TABLET ORAL DAILY
Qty: 90 TABLET | Refills: 1 | Status: SHIPPED | OUTPATIENT
Start: 2023-07-17

## 2023-07-18 ENCOUNTER — TELEPHONE (OUTPATIENT)
Dept: NEPHROLOGY | Facility: CLINIC | Age: 78
End: 2023-07-18

## 2023-07-18 NOTE — TELEPHONE ENCOUNTER
I called and left a message on machine for patient to return our call about rescheduling her 10/3/2023 nephrology follow up appointment with Dr. Jean Pierre Haley, because of Dr. Jean Pierre Haley on hospital call.

## 2023-07-18 NOTE — TELEPHONE ENCOUNTER
Birth Control Pills Counseling: Birth Control Pill Counseling: I discussed with the patient the potential side effects of OCPs including but not limited to increased risk of stroke, heart attack, thrombophlebitis, deep venous thrombosis, hepatic adenomas, breast changes, GI upset, headaches, and depression.  The patient verbalized understanding of the proper use and possible adverse effects of OCPs. All of the patient's questions and concerns were addressed. Patient of Dr Salcedo Agent seen at [facility], practice administrator at patients PCP, calling to advise patient has been cleared for surgery  Would like to discuss      Korin Gamezts can be reached at [phone] opt 1 Sarecycline Counseling: Patient advised regarding possible photosensitivity and discoloration of the teeth, skin, lips, tongue and gums.  Patient instructed to avoid sunlight, if possible.  When exposed to sunlight, patients should wear protective clothing, sunglasses, and sunscreen.  The patient was instructed to call the office immediately if the following severe adverse effects occur:  hearing changes, easy bruising/bleeding, severe headache, or vision changes.  The patient verbalized understanding of the proper use and possible adverse effects of sarecycline.  All of the patient's questions and concerns were addressed. Isotretinoin Pregnancy And Lactation Text: This medication is Pregnancy Category X and is considered extremely dangerous during pregnancy. It is unknown if it is excreted in breast milk. Topical Retinoid Pregnancy And Lactation Text: This medication is Pregnancy Category C. It is unknown if this medication is excreted in breast milk. Azithromycin Counseling:  I discussed with the patient the risks of azithromycin including but not limited to GI upset, allergic reaction, drug rash, diarrhea, and yeast infections. Doxycycline Pregnancy And Lactation Text: This medication is Pregnancy Category D and not consider safe during pregnancy. It is also excreted in breast milk but is considered safe for shorter treatment courses. Tetracycline Pregnancy And Lactation Text: This medication is Pregnancy Category D and not consider safe during pregnancy. It is also excreted in breast milk. Detail Level: Zone Topical Sulfur Applications Counseling: Topical Sulfur Counseling: Patient counseled that this medication may cause skin irritation or allergic reactions.  In the event of skin irritation, the patient was advised to reduce the amount of the drug applied or use it less frequently.   The patient verbalized understanding of the proper use and possible adverse effects of topical sulfur application.  All of the patient's questions and concerns were addressed. High Dose Vitamin A Counseling: Side effects reviewed, pt to contact office should one occur. Tazorac Counseling:  Patient advised that medication is irritating and drying.  Patient may need to apply sparingly and wash off after an hour before eventually leaving it on overnight.  The patient verbalized understanding of the proper use and possible adverse effects of tazorac.  All of the patient's questions and concerns were addressed. Erythromycin Counseling:  I discussed with the patient the risks of erythromycin including but not limited to GI upset, allergic reaction, drug rash, diarrhea, increase in liver enzymes, and yeast infections. Topical Sulfur Applications Pregnancy And Lactation Text: This medication is Pregnancy Category C and has an unknown safety profile during pregnancy. It is unknown if this topical medication is excreted in breast milk. Birth Control Pills Pregnancy And Lactation Text: This medication should be avoided if pregnant and for the first 30 days post-partum. Azithromycin Pregnancy And Lactation Text: This medication is considered safe during pregnancy and is also secreted in breast milk. High Dose Vitamin A Pregnancy And Lactation Text: High dose vitamin A therapy is contraindicated during pregnancy and breast feeding. Benzoyl Peroxide Counseling: Patient counseled that medicine may cause skin irritation and bleach clothing.  In the event of skin irritation, the patient was advised to reduce the amount of the drug applied or use it less frequently.   The patient verbalized understanding of the proper use and possible adverse effects of benzoyl peroxide.  All of the patient's questions and concerns were addressed. Patient Specific Counseling (Will Not Stick From Patient To Patient): Patient not a candidate for oral Spironolactone due to being prone to kidney stones. Patient does not wish to do Accutane. Dapsone Counseling: I discussed with the patient the risks of dapsone including but not limited to hemolytic anemia, agranulocytosis, rashes, methemoglobinemia, kidney failure, peripheral neuropathy, headaches, GI upset, and liver toxicity.  Patients who start dapsone require monitoring including baseline LFTs and weekly CBCs for the first month, then every month thereafter.  The patient verbalized understanding of the proper use and possible adverse effects of dapsone.  All of the patient's questions and concerns were addressed. Spironolactone Counseling: Patient advised regarding risks of diarrhea, abdominal pain, hyperkalemia, birth defects (for female patients), liver toxicity and renal toxicity. The patient may need blood work to monitor liver and kidney function and potassium levels while on therapy. The patient verbalized understanding of the proper use and possible adverse effects of spironolactone.  All of the patient's questions and concerns were addressed. Tazorac Pregnancy And Lactation Text: This medication is not safe during pregnancy. It is unknown if this medication is excreted in breast milk. Erythromycin Pregnancy And Lactation Text: This medication is Pregnancy Category B and is considered safe during pregnancy. It is also excreted in breast milk. Benzoyl Peroxide Pregnancy And Lactation Text: This medication is Pregnancy Category C. It is unknown if benzoyl peroxide is excreted in breast milk. Dapsone Pregnancy And Lactation Text: This medication is Pregnancy Category C and is not considered safe during pregnancy or breast feeding. Bactrim Counseling:  I discussed with the patient the risks of sulfa antibiotics including but not limited to GI upset, allergic reaction, drug rash, diarrhea, dizziness, photosensitivity, and yeast infections.  Rarely, more serious reactions can occur including but not limited to aplastic anemia, agranulocytosis, methemoglobinemia, blood dyscrasias, liver or kidney failure, lung infiltrates or desquamative/blistering drug rashes. Minocycline Counseling: Patient advised regarding possible photosensitivity and discoloration of the teeth, skin, lips, tongue and gums.  Patient instructed to avoid sunlight, if possible.  When exposed to sunlight, patients should wear protective clothing, sunglasses, and sunscreen.  The patient was instructed to call the office immediately if the following severe adverse effects occur:  hearing changes, easy bruising/bleeding, severe headache, or vision changes.  The patient verbalized understanding of the proper use and possible adverse effects of minocycline.  All of the patient's questions and concerns were addressed. Topical Clindamycin Counseling: Patient counseled that this medication may cause skin irritation or allergic reactions.  In the event of skin irritation, the patient was advised to reduce the amount of the drug applied or use it less frequently.   The patient verbalized understanding of the proper use and possible adverse effects of clindamycin.  All of the patient's questions and concerns were addressed. Isotretinoin Counseling: Patient should get monthly blood tests, not donate blood, not drive at night if vision affected, not share medication, and not undergo elective surgery for 6 months after tx completed. Side effects reviewed, pt to contact office should one occur. Topical Retinoid counseling:  Patient advised to apply a pea-sized amount only at bedtime and wait 30 minutes after washing their face before applying.  If too drying, patient may add a non-comedogenic moisturizer. The patient verbalized understanding of the proper use and possible adverse effects of retinoids.  All of the patient's questions and concerns were addressed. Spironolactone Pregnancy And Lactation Text: This medication can cause feminization of the male fetus and should be avoided during pregnancy. The active metabolite is also found in breast milk. Doxycycline Counseling:  Patient counseled regarding possible photosensitivity and increased risk for sunburn.  Patient instructed to avoid sunlight, if possible.  When exposed to sunlight, patients should wear protective clothing, sunglasses, and sunscreen.  The patient was instructed to call the office immediately if the following severe adverse effects occur:  hearing changes, easy bruising/bleeding, severe headache, or vision changes.  The patient verbalized understanding of the proper use and possible adverse effects of doxycycline.  All of the patient's questions and concerns were addressed. Bactrim Pregnancy And Lactation Text: This medication is Pregnancy Category D and is known to cause fetal risk.  It is also excreted in breast milk. Topical Clindamycin Pregnancy And Lactation Text: This medication is Pregnancy Category B and is considered safe during pregnancy. It is unknown if it is excreted in breast milk. Use Enhanced Medication Counseling?: No Tetracycline Counseling: Patient counseled regarding possible photosensitivity and increased risk for sunburn.  Patient instructed to avoid sunlight, if possible.  When exposed to sunlight, patients should wear protective clothing, sunglasses, and sunscreen.  The patient was instructed to call the office immediately if the following severe adverse effects occur:  hearing changes, easy bruising/bleeding, severe headache, or vision changes.  The patient verbalized understanding of the proper use and possible adverse effects of tetracycline.  All of the patient's questions and concerns were addressed. Patient understands to avoid pregnancy while on therapy due to potential birth defects. Azelaic Acid Counseling: Patient counseled that medicine may cause skin irritation and to avoid applying near the eyes.  In the event of skin irritation, the patient was advised to reduce the amount of the drug applied or use it less frequently.   The patient verbalized understanding of the proper use and possible adverse effects of azelaic acid.  All of the patient's questions and concerns were addressed. Winlevi Counseling:  I discussed with the patient the risks of topical clascoterone including but not limited to erythema, scaling, itching, and stinging. Patient voiced their understanding. Aklief counseling:  Patient advised to apply a pea-sized amount only at bedtime and wait 30 minutes after washing their face before applying.  If too drying, patient may add a non-comedogenic moisturizer.  The most commonly reported side effects including irritation, redness, scaling, dryness, stinging, burning, itching, and increased risk of sunburn.  The patient verbalized understanding of the proper use and possible adverse effects of retinoids.  All of the patient's questions and concerns were addressed. Aklief Pregnancy And Lactation Text: It is unknown if this medication is safe to use during pregnancy.  It is unknown if this medication is excreted in breast milk.  Breastfeeding women should use the topical cream on the smallest area of the skin for the shortest time needed while breastfeeding.  Do not apply to nipple and areola. Azelaic Acid Pregnancy And Lactation Text: This medication is considered safe during pregnancy and breast feeding. Winlevi Pregnancy And Lactation Text: This medication is considered safe during pregnancy and breastfeeding.

## 2023-07-27 ENCOUNTER — APPOINTMENT (EMERGENCY)
Dept: CT IMAGING | Facility: HOSPITAL | Age: 78
DRG: 066 | End: 2023-07-27
Payer: COMMERCIAL

## 2023-07-27 ENCOUNTER — HOSPITAL ENCOUNTER (INPATIENT)
Facility: HOSPITAL | Age: 78
LOS: 2 days | Discharge: HOME HEALTH | DRG: 066 | End: 2023-07-29
Attending: EMERGENCY MEDICINE | Admitting: STUDENT IN AN ORGANIZED HEALTH CARE EDUCATION/TRAINING PROGRAM
Payer: COMMERCIAL

## 2023-07-27 ENCOUNTER — APPOINTMENT (INPATIENT)
Dept: MRI IMAGING | Facility: HOSPITAL | Age: 78
DRG: 066 | End: 2023-07-27
Payer: COMMERCIAL

## 2023-07-27 DIAGNOSIS — R47.1 DYSARTHRIA: ICD-10-CM

## 2023-07-27 DIAGNOSIS — E11.21 TYPE 2 DIABETES MELLITUS WITH DIABETIC NEPHROPATHY, WITHOUT LONG-TERM CURRENT USE OF INSULIN (HCC): ICD-10-CM

## 2023-07-27 DIAGNOSIS — Z01.810 PREOP CARDIOVASCULAR EXAM: ICD-10-CM

## 2023-07-27 DIAGNOSIS — E78.5 HYPERLIPIDEMIA ASSOCIATED WITH TYPE 2 DIABETES MELLITUS (HCC): ICD-10-CM

## 2023-07-27 DIAGNOSIS — R29.898 WEAKNESS OF RIGHT ARM: ICD-10-CM

## 2023-07-27 DIAGNOSIS — I63.9 CVA (CEREBRAL VASCULAR ACCIDENT) (HCC): Primary | ICD-10-CM

## 2023-07-27 DIAGNOSIS — I65.22 SYMPTOMATIC CAROTID ARTERY STENOSIS, LEFT: ICD-10-CM

## 2023-07-27 DIAGNOSIS — I24.0 RIGHT CORONARY ARTERY OCCLUSION (HCC): ICD-10-CM

## 2023-07-27 DIAGNOSIS — R47.01 APHASIA: ICD-10-CM

## 2023-07-27 DIAGNOSIS — R20.8 DECREASED SENSATION OF HAND AND ARM: ICD-10-CM

## 2023-07-27 DIAGNOSIS — E11.69 HYPERLIPIDEMIA ASSOCIATED WITH TYPE 2 DIABETES MELLITUS (HCC): ICD-10-CM

## 2023-07-27 PROBLEM — E11.9 DIABETES (HCC): Status: ACTIVE | Noted: 2021-04-27

## 2023-07-27 PROBLEM — I16.0 HYPERTENSIVE URGENCY: Status: ACTIVE | Noted: 2023-07-27

## 2023-07-27 LAB
2HR DELTA HS TROPONIN: 1 NG/L
4HR DELTA HS TROPONIN: 0 NG/L
ANION GAP SERPL CALCULATED.3IONS-SCNC: 10 MMOL/L
BASOPHILS # BLD AUTO: 0.05 THOUSANDS/ÂΜL (ref 0–0.1)
BASOPHILS NFR BLD AUTO: 1 % (ref 0–1)
BUN SERPL-MCNC: 19 MG/DL (ref 5–25)
CALCIUM SERPL-MCNC: 10.7 MG/DL (ref 8.4–10.2)
CARDIAC TROPONIN I PNL SERPL HS: 7 NG/L
CARDIAC TROPONIN I PNL SERPL HS: 7 NG/L
CARDIAC TROPONIN I PNL SERPL HS: 8 NG/L
CHLORIDE SERPL-SCNC: 103 MMOL/L (ref 96–108)
CHOLEST SERPL-MCNC: 369 MG/DL
CO2 SERPL-SCNC: 25 MMOL/L (ref 21–32)
CREAT SERPL-MCNC: 1.54 MG/DL (ref 0.6–1.3)
EOSINOPHIL # BLD AUTO: 0.26 THOUSAND/ÂΜL (ref 0–0.61)
EOSINOPHIL NFR BLD AUTO: 3 % (ref 0–6)
ERYTHROCYTE [DISTWIDTH] IN BLOOD BY AUTOMATED COUNT: 12.8 % (ref 11.6–15.1)
EST. AVERAGE GLUCOSE BLD GHB EST-MCNC: 214 MG/DL
GFR SERPL CREATININE-BSD FRML MDRD: 32 ML/MIN/1.73SQ M
GLUCOSE SERPL-MCNC: 215 MG/DL (ref 65–140)
GLUCOSE SERPL-MCNC: 228 MG/DL (ref 65–140)
GLUCOSE SERPL-MCNC: 267 MG/DL (ref 65–140)
HBA1C MFR BLD: 9.1 %
HCT VFR BLD AUTO: 42.4 % (ref 34.8–46.1)
HDLC SERPL-MCNC: 44 MG/DL
HGB BLD-MCNC: 14.9 G/DL (ref 11.5–15.4)
IMM GRANULOCYTES # BLD AUTO: 0.05 THOUSAND/UL (ref 0–0.2)
IMM GRANULOCYTES NFR BLD AUTO: 1 % (ref 0–2)
LDLC SERPL CALC-MCNC: 258 MG/DL (ref 0–100)
LYMPHOCYTES # BLD AUTO: 1.66 THOUSANDS/ÂΜL (ref 0.6–4.47)
LYMPHOCYTES NFR BLD AUTO: 20 % (ref 14–44)
MCH RBC QN AUTO: 30.1 PG (ref 26.8–34.3)
MCHC RBC AUTO-ENTMCNC: 35.1 G/DL (ref 31.4–37.4)
MCV RBC AUTO: 86 FL (ref 82–98)
MONOCYTES # BLD AUTO: 0.57 THOUSAND/ÂΜL (ref 0.17–1.22)
MONOCYTES NFR BLD AUTO: 7 % (ref 4–12)
NEUTROPHILS # BLD AUTO: 5.93 THOUSANDS/ÂΜL (ref 1.85–7.62)
NEUTS SEG NFR BLD AUTO: 68 % (ref 43–75)
NRBC BLD AUTO-RTO: 0 /100 WBCS
PLATELET # BLD AUTO: 249 THOUSANDS/UL (ref 149–390)
PMV BLD AUTO: 9.7 FL (ref 8.9–12.7)
POTASSIUM SERPL-SCNC: 3.9 MMOL/L (ref 3.5–5.3)
RBC # BLD AUTO: 4.95 MILLION/UL (ref 3.81–5.12)
SODIUM SERPL-SCNC: 138 MMOL/L (ref 135–147)
TRIGL SERPL-MCNC: 334 MG/DL
WBC # BLD AUTO: 8.52 THOUSAND/UL (ref 4.31–10.16)

## 2023-07-27 PROCEDURE — G1004 CDSM NDSC: HCPCS

## 2023-07-27 PROCEDURE — 84484 ASSAY OF TROPONIN QUANT: CPT | Performed by: EMERGENCY MEDICINE

## 2023-07-27 PROCEDURE — 83036 HEMOGLOBIN GLYCOSYLATED A1C: CPT | Performed by: EMERGENCY MEDICINE

## 2023-07-27 PROCEDURE — 80048 BASIC METABOLIC PNL TOTAL CA: CPT | Performed by: EMERGENCY MEDICINE

## 2023-07-27 PROCEDURE — 70498 CT ANGIOGRAPHY NECK: CPT

## 2023-07-27 PROCEDURE — 99223 1ST HOSP IP/OBS HIGH 75: CPT | Performed by: STUDENT IN AN ORGANIZED HEALTH CARE EDUCATION/TRAINING PROGRAM

## 2023-07-27 PROCEDURE — 70551 MRI BRAIN STEM W/O DYE: CPT

## 2023-07-27 PROCEDURE — 82948 REAGENT STRIP/BLOOD GLUCOSE: CPT

## 2023-07-27 PROCEDURE — 93005 ELECTROCARDIOGRAM TRACING: CPT

## 2023-07-27 PROCEDURE — 80061 LIPID PANEL: CPT | Performed by: EMERGENCY MEDICINE

## 2023-07-27 PROCEDURE — 99285 EMERGENCY DEPT VISIT HI MDM: CPT | Performed by: EMERGENCY MEDICINE

## 2023-07-27 PROCEDURE — 83036 HEMOGLOBIN GLYCOSYLATED A1C: CPT | Performed by: STUDENT IN AN ORGANIZED HEALTH CARE EDUCATION/TRAINING PROGRAM

## 2023-07-27 PROCEDURE — 99285 EMERGENCY DEPT VISIT HI MDM: CPT

## 2023-07-27 PROCEDURE — 85025 COMPLETE CBC W/AUTO DIFF WBC: CPT | Performed by: EMERGENCY MEDICINE

## 2023-07-27 PROCEDURE — 36415 COLL VENOUS BLD VENIPUNCTURE: CPT | Performed by: EMERGENCY MEDICINE

## 2023-07-27 PROCEDURE — 70496 CT ANGIOGRAPHY HEAD: CPT

## 2023-07-27 RX ORDER — ASPIRIN 81 MG/1
81 TABLET, CHEWABLE ORAL DAILY
Status: DISCONTINUED | OUTPATIENT
Start: 2023-07-28 | End: 2023-07-27

## 2023-07-27 RX ORDER — CLOPIDOGREL BISULFATE 75 MG/1
75 TABLET ORAL DAILY
Status: DISCONTINUED | OUTPATIENT
Start: 2023-07-28 | End: 2023-07-28

## 2023-07-27 RX ORDER — LOSARTAN POTASSIUM 50 MG/1
100 TABLET ORAL DAILY
Status: DISCONTINUED | OUTPATIENT
Start: 2023-07-27 | End: 2023-07-29 | Stop reason: HOSPADM

## 2023-07-27 RX ORDER — AMLODIPINE BESYLATE 10 MG/1
10 TABLET ORAL DAILY
Status: DISCONTINUED | OUTPATIENT
Start: 2023-07-28 | End: 2023-07-29 | Stop reason: HOSPADM

## 2023-07-27 RX ORDER — LABETALOL HYDROCHLORIDE 5 MG/ML
10 INJECTION, SOLUTION INTRAVENOUS EVERY 6 HOURS PRN
Status: DISCONTINUED | OUTPATIENT
Start: 2023-07-27 | End: 2023-07-29 | Stop reason: HOSPADM

## 2023-07-27 RX ORDER — INSULIN LISPRO 100 [IU]/ML
2-12 INJECTION, SOLUTION INTRAVENOUS; SUBCUTANEOUS
Status: DISCONTINUED | OUTPATIENT
Start: 2023-07-27 | End: 2023-07-29 | Stop reason: HOSPADM

## 2023-07-27 RX ORDER — LOSARTAN POTASSIUM 50 MG/1
100 TABLET ORAL DAILY
Status: DISCONTINUED | OUTPATIENT
Start: 2023-07-28 | End: 2023-07-27

## 2023-07-27 RX ORDER — HEPARIN SODIUM 5000 [USP'U]/ML
5000 INJECTION, SOLUTION INTRAVENOUS; SUBCUTANEOUS EVERY 8 HOURS SCHEDULED
Status: DISCONTINUED | OUTPATIENT
Start: 2023-07-27 | End: 2023-07-29 | Stop reason: HOSPADM

## 2023-07-27 RX ORDER — LABETALOL 100 MG/1
300 TABLET, FILM COATED ORAL 2 TIMES DAILY
Status: DISCONTINUED | OUTPATIENT
Start: 2023-07-27 | End: 2023-07-29 | Stop reason: HOSPADM

## 2023-07-27 RX ORDER — CHLORAL HYDRATE 500 MG
1000 CAPSULE ORAL DAILY
Status: DISCONTINUED | OUTPATIENT
Start: 2023-07-27 | End: 2023-07-29 | Stop reason: HOSPADM

## 2023-07-27 RX ORDER — CLONIDINE 0.3 MG/24H
1 PATCH, EXTENDED RELEASE TRANSDERMAL WEEKLY
Status: DISCONTINUED | OUTPATIENT
Start: 2023-07-27 | End: 2023-07-29 | Stop reason: HOSPADM

## 2023-07-27 RX ADMIN — LOSARTAN POTASSIUM 100 MG: 50 TABLET, FILM COATED ORAL at 17:39

## 2023-07-27 RX ADMIN — INSULIN LISPRO 4 UNITS: 100 INJECTION, SOLUTION INTRAVENOUS; SUBCUTANEOUS at 21:47

## 2023-07-27 RX ADMIN — HEPARIN SODIUM 5000 UNITS: 5000 INJECTION INTRAVENOUS; SUBCUTANEOUS at 17:42

## 2023-07-27 RX ADMIN — LABETALOL HYDROCHLORIDE 300 MG: 100 TABLET, FILM COATED ORAL at 17:40

## 2023-07-27 RX ADMIN — Medication 10 MG: at 22:04

## 2023-07-27 RX ADMIN — INSULIN LISPRO 4 UNITS: 100 INJECTION, SOLUTION INTRAVENOUS; SUBCUTANEOUS at 17:44

## 2023-07-27 RX ADMIN — IOHEXOL 85 ML: 350 INJECTION, SOLUTION INTRAVENOUS at 13:00

## 2023-07-27 RX ADMIN — HEPARIN SODIUM 5000 UNITS: 5000 INJECTION INTRAVENOUS; SUBCUTANEOUS at 21:47

## 2023-07-27 NOTE — ASSESSMENT & PLAN NOTE
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

## 2023-07-27 NOTE — ED PROVIDER NOTES
History  Chief Complaint   Patient presents with   • Speech Problem     Speech problem since sat; family noticed aphasia and garbled speech, also R arm numbness x days; headaches, pt voices that she is a DNR      44-year-old female history of diabetes, hypertension on Plavix, and stroke presenting with speech difficulty and right arm difficulty. Patient reports symptoms began on Saturday beginning with speech difficulty including slurred speech and word find difficulty. Patient also reports decreased sensation to the dorsal aspect of her right arm as well as right hand clumsiness. Denies any chest pain shortness of breath. Reports intermittent left-sided headache. Denies abdominal pain nausea vomiting diarrhea. Denies any other complaints. Chart reviewed. Past Medical History:  No date: Arthritis  No date: Chronic kidney disease  No date: Endometriosis  No date: High cholesterol  No date: Hypertension  No date: Kidney disease, chronic, stage III (moderate, EGFR 30-59 ml/  min) ([facility])  No date: Lumbar disc herniation  No date: Neuropathy  No date: Peripheral vascular disease ([facility])  No date: Pneumonia  No date: Shoulder injury      Comment:  left  No date: Spinal stenosis  2015: Stroke ([address])      Comment:  Memory loss  Family History: non-contributory  Social History            Prior to Admission Medications   Prescriptions Last Dose Informant Patient Reported? Taking?    Icosapent Ethyl (Vascepa) 1 g CAPS   No No   Sig: TAKE 2 CAPS BY MOUTH TWICE DAILY WITH FOOD   allopurinol (ZYLOPRIM) 100 mg tablet   No No   Sig: TAKE 1 TABLET BY MOUTH EVERY DAY   amLODIPine (NORVASC) 10 mg tablet   No No   Sig: Take 1 tablet (10 mg total) by mouth daily   cloNIDine (CATAPRES-TTS-3) 0.3 mg/24 hr   No No   Sig: PLACE 1 PATCH (0.3 MG TOTAL) ON THE SKIN ONCE A WEEK   clopidogrel (PLAVIX) 75 mg tablet   No No   Sig: TAKE 1 TABLET BY MOUTH EVERY DAY   labetalol (NORMODYNE) 300 mg tablet   No No   Sig: Take 1 tablet (300 mg total) by mouth 2 (two) times a day   losartan (COZAAR) 100 MG tablet   No No   Sig: Take 1 tablet (100 mg total) by mouth daily   zinc oxide 20 % ointment   No No   Sig: Apply topically as needed for irritation      Facility-Administered Medications: None       Past Medical History:   Diagnosis Date   • Arthritis    • Chronic kidney disease    • Endometriosis    • High cholesterol    • Hypertension    • Kidney disease, chronic, stage III (moderate, EGFR 30-59 ml/min) ([facility])    • Lumbar disc herniation    • Neuropathy    • Peripheral vascular disease ([facility])    • Pneumonia    • Shoulder injury     left   • Spinal stenosis    • Stroke ([address] ) 2015    Memory loss       Past Surgical History:   Procedure Laterality Date   • CARDIAC CATHETERIZATION     • COLONOSCOPY     • FL RETROGRADE PYELOGRAM  4/6/2020   • FRACTURE SURGERY Right     ankle   • OVARIAN CYST SURGERY     • HI CYSTO BLADDER W/URETERAL CATHETERIZATION Bilateral 4/6/2020    Procedure: CYSTOSCOPY WITH RETROGRADE PYELOGRAM;  Surgeon: Can Braden MD;  [facility];  Service: Urology   • HI CYSTO W/INSERT URETERAL STENT Right 4/6/2020    Procedure: INSERTION STENT URETERAL;  Surgeon: Can Braden MD;  [facility];  Service: Urology   • HI CYSTO W/REMOVAL OF TUMORS SMALL N/A 4/6/2020    Procedure: TRANSURETHRAL RESECTION OF BLADDER TUMOR (TURBT); Surgeon: Can Braden MD;  [facility];  Service: Urology   • ROTATOR CUFF REPAIR Left    • TONSILLECTOMY         Family History   Problem Relation Age of Onset   • Hypertension Mother    • Heart disease Mother         Valvular   • Hyperlipidemia Mother    • Hypertension Father    • Heart defect Father         Cardiomegaly   • Stroke Sister         Cerebrovascular Accident   • Arthritis Brother    • Other Brother         Back Disorder     I have reviewed and agree with the history as documented.     E-Cigarette/Vaping   • E-Cigarette Use Never User      E-Cigarette/Vaping Substances   • Nicotine No    • THC No    • CBD No    • Flavoring No    • Other No    • Unknown No      Social History     Tobacco Use   • Smoking status: Former     Packs/day: 2.00     Years: 40.00     Total pack years: 80.00     Types: Cigarettes     Start date: 1966     Quit date: 7/27/2020     Years since quitting: 3.0   • Smokeless tobacco: Never   Vaping Use   • Vaping Use: Never used   Substance Use Topics   • Alcohol use: Never   • Drug use: Never       Review of Systems   Constitutional: Negative for appetite change, chills, diaphoresis, fever and unexpected weight change. HENT: Negative for congestion and rhinorrhea. Eyes: Negative for photophobia and visual disturbance. Respiratory: Negative for cough, chest tightness and shortness of breath. Cardiovascular: Negative for chest pain, palpitations and leg swelling. Gastrointestinal: Negative for abdominal distention, abdominal pain, blood in stool, constipation, diarrhea, nausea and vomiting. Genitourinary: Negative for dysuria and hematuria. Musculoskeletal: Negative for back pain, joint swelling, neck pain and neck stiffness. Skin: Negative for color change, pallor, rash and wound. Neurological: Positive for speech difficulty, numbness and headaches. Negative for dizziness, syncope, weakness and light-headedness. Psychiatric/Behavioral: Negative for agitation. All other systems reviewed and are negative. Physical Exam  Physical Exam  Vitals and nursing note reviewed. Constitutional:       General: She is not in acute distress. Appearance: Normal appearance. She is well-developed. She is not ill-appearing, toxic-appearing or diaphoretic. HENT:      Head: Normocephalic and atraumatic. Nose: Nose normal. No congestion or rhinorrhea. Mouth/Throat:      Mouth: Mucous membranes are moist.      Pharynx: Oropharynx is clear. No oropharyngeal exudate or posterior oropharyngeal erythema. Eyes:      General: No scleral icterus. Right eye: No discharge. Left eye: No discharge. Extraocular Movements: Extraocular movements intact. Conjunctiva/sclera: Conjunctivae normal.      Pupils: Pupils are equal, round, and reactive to light. Neck:      Vascular: No JVD. Trachea: No tracheal deviation. Comments: Supple. Normal range of motion. Cardiovascular:      Rate and Rhythm: Normal rate and regular rhythm. Heart sounds: Normal heart sounds. No murmur heard. No friction rub. No gallop. Comments: Normal rate and regular rhythm  Pulmonary:      Effort: Pulmonary effort is normal. No respiratory distress. Breath sounds: Normal breath sounds. No stridor. No wheezing or rales. Comments: Clear to auscultation bilaterally  Chest:      Chest wall: No tenderness. Abdominal:      General: Bowel sounds are normal. There is no distension. Palpations: Abdomen is soft. Tenderness: There is no abdominal tenderness. There is no right CVA tenderness, left CVA tenderness, guarding or rebound. Comments: Soft, nontender, nondistended. Normal bowel sounds throughout   Musculoskeletal:         General: No swelling, tenderness, deformity or signs of injury. Normal range of motion. Cervical back: Normal range of motion and neck supple. No rigidity. No muscular tenderness. Right lower leg: No edema. Left lower leg: No edema. Lymphadenopathy:      Cervical: No cervical adenopathy. Skin:     General: Skin is warm and dry. Coloration: Skin is not pale. Findings: No erythema or rash. Neurological:      Mental Status: She is alert and oriented to person, place, and time. Cranial Nerves: Cranial nerve deficit present. Sensory: Sensory deficit present. Motor: No weakness or abnormal muscle tone. Coordination: Coordination abnormal.      Gait: Gait normal.      Comments: A&Ox3 to person, place, and time. Dysarthria. CN 2-12 otherwise intact.   Decreased  strength and 4 out of 5 strength right upper extremity. Decree sensation dorsal aspect right arm. Strength otherwise 5/5 throughout. Sensation otherwise intact throughout. Cerebellar exam including gait intact. Psychiatric:         Behavior: Behavior normal.         Thought Content: Thought content normal.         Vital Signs  ED Triage Vitals [07/27/23 1137]   Temperature Pulse Respirations Blood Pressure SpO2   98 °F (36.7 °C) 74 16 (!) 195/80 97 %      Temp src Heart Rate Source Patient Position - Orthostatic VS BP Location FiO2 (%)   -- -- -- -- --      Pain Score       --           Vitals:    07/27/23 1200 07/27/23 1230 07/27/23 1315 07/27/23 1345   BP: 156/65 153/77 (!) 198/77 (!) 172/77   Pulse: 70 75 76 73         Visual Acuity  Visual Acuity    Flowsheet Row Most Recent Value   L Pupil Size (mm) 3   R Pupil Size (mm) 3          ED Medications  Medications   iohexol (OMNIPAQUE) 350 MG/ML injection (SINGLE-DOSE) 85 mL (85 mL Intravenous Given 7/27/23 1300)       Diagnostic Studies  Results Reviewed     Procedure Component Value Units Date/Time    HS Troponin I 2hr [[id number]] Collected: 07/27/23 1515    Lab Status:  In process Specimen: Blood from Arm, Right Updated: 07/27/23 1518    HS Troponin I 4hr [[id number]]     Lab Status: No result Specimen: Blood     HS Troponin 0hr (reflex protocol) [[id number]]  (Normal) Collected: 07/27/23 1217    Lab Status: Final result Specimen: Blood from Arm, Right Updated: 07/27/23 1246     hs TnI 0hr 7 ng/L     Lipid Panel with Direct LDL reflex [[id number]]  (Abnormal) Collected: 07/27/23 1217    Lab Status: Final result Specimen: Blood from Arm, Right Updated: 07/27/23 1238     Cholesterol 369 mg/dL      Triglycerides 334 mg/dL      HDL, Direct 44 mg/dL      LDL Calculated 258 mg/dL     Basic metabolic panel [[id number]]  (Abnormal) Collected: 07/27/23 1217    Lab Status: Final result Specimen: Blood from Arm, Right Updated: 07/27/23 1238     Sodium 138 mmol/L Potassium 3.9 mmol/L      Chloride 103 mmol/L      CO2 25 mmol/L      ANION GAP 10 mmol/L      BUN 19 mg/dL      Creatinine 1.54 mg/dL      Glucose 267 mg/dL      Calcium 10.7 mg/dL      eGFR 32 ml/min/1.73sq m     Narrative:      [facility] guidelines for Chronic Kidney Disease (CKD):   •  Stage 1 with normal or high GFR (GFR > 90 mL/min/1.73 square meters)  •  Stage 2 Mild CKD (GFR = 60-89 mL/min/1.73 square meters)  •  Stage 3A Moderate CKD (GFR = 45-59 mL/min/1.73 square meters)  •  Stage 3B Moderate CKD (GFR = 30-44 mL/min/1.73 square meters)  •  Stage 4 Severe CKD (GFR = 15-29 mL/min/1.73 square meters)  •  Stage 5 End Stage CKD (GFR <15 mL/min/1.73 square meters)  Note: GFR calculation is accurate only with a steady state creatinine    CBC and differential [[id number]] Collected: 07/27/23 1217    Lab Status: Final result Specimen: Blood from Arm, Right Updated: 07/27/23 1224     WBC 8.52 Thousand/uL      RBC 4.95 Million/uL      Hemoglobin 14.9 g/dL      Hematocrit 42.4 %      MCV 86 fL      MCH 30.1 pg      MCHC 35.1 g/dL      RDW 12.8 %      MPV 9.7 fL      Platelets 837 Thousands/uL      nRBC 0 /100 WBCs      Neutrophils Relative 68 %      Immat GRANS % 1 %      Lymphocytes Relative 20 %      Monocytes Relative 7 %      Eosinophils Relative 3 %      Basophils Relative 1 %      Neutrophils Absolute 5.93 Thousands/µL      Immature Grans Absolute 0.05 Thousand/uL      Lymphocytes Absolute 1.66 Thousands/µL      Monocytes Absolute 0.57 Thousand/µL      Eosinophils Absolute 0.26 Thousand/µL      Basophils Absolute 0.05 Thousands/µL     Hemoglobin A1C [[id number]] Collected: 07/27/23 1217    Lab Status: In process Specimen: Blood from Arm, Right Updated: 07/27/23 1221                 CTA head and neck with and without contrast   Final Result by Betty Yoon MD (07/27 1442)      1.   Hypodensity and loss of gray-white matter differentiation involving left occipital and inferior parietal lobe concerning for evolving acute or subacute infarct. 2.  Chronic microangiopathic change. 3.  High-grade stenosis of the left PCA at proximal P2 segment. Occluded versus hypoplastic left P1 segment with patent P-comm perfusing left PCA. 4.  Severe left and moderate right bilateral supraclinoid ICA stenosis. 5.  Severe stenosis of the left vertebral artery origin. Patent narrow caliber left vertebral artery in the cervical and pre-PICA intracranial segments with multifocal severe stenosis. Post PICA segment is occluded. 6.  Multifocal high-grade stenosis of the right intracranial vertebral artery. 7.  Advanced atherosclerotic change of bilateral cervical carotid arteries. Estimated 85-90% left and 80% right proximal ICA stenosis. 8.  Atherosclerotic disease of the aortic arch with linear defect at the origin of left subclavian artery. Differentials include ulcerated plaque versus focal dissection flap (likely chronic). I personally discussed this study with Loraine Collazo on 7/27/2023 2:22PM.            Workstation performed: [id number]                    Procedures  Procedures         ED Course             HEART Risk Score    Flowsheet Row Most Recent Value   Heart Score Risk Calculator    History 0 Filed at: 07/27/2023 1300   ECG 1 Filed at: 07/27/2023 1300   Age 2 Filed at: 07/27/2023 1300   Risk Factors 2 Filed at: 07/27/2023 1300   Troponin 0 Filed at: 07/27/2023 1300   HEART Score 5 Filed at: 07/27/2023 1300        Identification of Seniors at [facility] Most Recent Value   (ISAR) Identification of Seniors at Risk    Before the illness or injury that brought you to the Emergency, did you need someone to help you on a regular basis? 0 Filed at: 07/27/2023 1138   In the last 24 hours, have you needed more help than usual? 0 Filed at: 07/27/2023 1138   Have you been hospitalized for one or more nights during the past 6 months?  0 Filed at: 07/27/2023 1138   In general, do you see well? 0 Filed at: 07/27/2023 1138   In general, do you have serious problems with your memory? 0 Filed at: 07/27/2023 1138   Do you take more than three different medications every day? 0 Filed at: 07/27/2023 1138   ISAR Score 0 Filed at: 07/27/2023 1138                      SBIRT 20yo+    Flowsheet Row Most Recent Value   Initial Alcohol Screen: US AUDIT-C     1. How often do you have a drink containing alcohol? 0 Filed at: 07/27/2023 1138   2. How many drinks containing alcohol do you have on a typical day you are drinking? 0 Filed at: 07/27/2023 1138   3a. Male UNDER 65: How often do you have five or more drinks on one occasion? 0 Filed at: 07/27/2023 1138   3b. FEMALE Any Age, or MALE 65+: How often do you have 4 or more drinks on one occassion? 0 Filed at: 07/27/2023 1138   Audit-C Score 0 Filed at: 07/27/2023 1138   DALE: How many times in the past year have you. .. Used an illegal drug or used a prescription medication for non-medical reasons? Never Filed at: 07/27/2023 1138                    Medical Decision Making  72-year-old female history of diabetes, hypertension on Plavix, and stroke presenting with speech difficulty and right arm difficulty. Symptoms concerning for stroke. Patient outside window given symptoms began on Saturday. Plan for CT imaging and labs plus cardiac evaluation including EKG and troponin. Basic labs. Reassess. EKG interpreted by me with normal sinus rhythm and nonspecific ST abnormality. Labs interpreted by me notable for elevated cholesterol. Imaging notable for subacute infarct and high-grade stenosis. Plan for admission for stroke pathway. Admitted. Amount and/or Complexity of Data Reviewed  Labs: ordered. Radiology: ordered. Risk  Prescription drug management. Decision regarding hospitalization.           Disposition  Final diagnoses:   CVA (cerebral vascular accident) ([address])   Dysarthria   Aphasia   Weakness of right arm   Decreased sensation of hand and arm     Time reflects when diagnosis was documented in both MDM as applicable and the Disposition within this note     Time User Action Codes Description Comment    7/27/2023  3:22 PM Jerrald Millers Add [I63.9] CVA (cerebral vascular accident) ([address])     7/27/2023  3:22 PM Jerrald Millers Add [R47.1] Dysarthria     7/27/2023  3:22 PM Jerrald Millers Add [R47.01] Aphasia     7/27/2023  3:29 PM Jerrald Millers Add [R29.898] Weakness of right arm     7/27/2023  3:29 PM Jerrald Millers Add [R20.8] Decreased sensation of hand and arm       ED Disposition     ED Disposition   Admit    Condition   Stable    Date/Time   Thu Jul 27, 2023  3:25 PM    Comment   Case was discussed with NICOLLE and the patient's admission status was agreed to be Admission Status: observation status to the service of Dr. Carlos Lujan . Follow-up Information    None         Patient's Medications   Discharge Prescriptions    No medications on file       No discharge procedures on file.     PDMP Review       Value Time User    PDMP Reviewed  Yes 4/6/2020  8:10 AM Nima Andrews MD          ED Provider  Electronically Signed by           Sathya Adair MD  07/27/23 0119

## 2023-07-27 NOTE — ASSESSMENT & PLAN NOTE
Lab Results   Component Value Date    HGBA1C 10.3 (H) 06/14/2023       No results for input(s): "POCGLU" in the last 72 hours.     Blood Sugar Average: Last 72 hrs:  · Poorly controlled diabetes  · Start sliding scale algorithm 4  · Monitor glucose levels  · Titrate as tolerated

## 2023-07-27 NOTE — ASSESSMENT & PLAN NOTE
· Present on admission  · Out of TNK window due to last known well  · Out of permissive hypertension window  · Resume home meds  · Add labetalol PRN IV   · Monitor blood pressure

## 2023-07-27 NOTE — H&P
[address]  H&P  Name: Suzi Alfredo 70 y.o. female I MRN: [id number]  Unit/Bed#: ED 29 I Date of Admission: 7/27/2023   Date of Service: 7/27/2023 I Hospital Day: 0      Assessment/Plan   * CVA (cerebral vascular accident) [facility])  Assessment & Plan  · CTA head/neck showing acute to subacute infarct  · Likely from atherosclerotic disease/microvascular disease  · On plavix, allergic to statin, start fish oil   · Start stroke pathway, consult neurology  · Monitor on telemetry     Hypertensive urgency  Assessment & Plan  · Present on admission  · Out of TNK window due to last known well  · Out of permissive hypertension window  · Resume home meds  · Add labetalol PRN IV   · Monitor blood pressure     Diabetes (HCC)  Assessment & Plan  Lab Results   Component Value Date    HGBA1C 10.3 (H) 06/14/2023       No results for input(s): "POCGLU" in the last 72 hours. Blood Sugar Average: Last 72 hrs:  · Poorly controlled diabetes  · Start sliding scale algorithm 4  · Monitor glucose levels  · Titrate as tolerated        VTE Pharmacologic Prophylaxis:   Moderate Risk (Score 3-4) - Pharmacological DVT Prophylaxis Ordered: heparin. Code Status: Level 1 - Full Code   Discussion with family: Updated  (son) at bedside. Anticipated Length of Stay: Patient will be admitted on an inpatient basis with an anticipated length of stay of greater than 2 midnights secondary to stroke. Total Time Spent on Date of Encounter in care of patient: 40 minutes This time was spent on one or more of the following: performing physical exam; counseling and coordination of care; obtaining or reviewing history; documenting in the medical record; reviewing/ordering tests, medications or procedures; communicating with other healthcare professionals and discussing with patient's family/caregivers.     Chief Complaint: stroke like symptoms     History of Present Illness:  Suzi Alfredo is a 70 y.o. female with a PMH of htn, hld, dm who presents with constellation of symptoms including dysarthria, weakness, reduced short term memory that's been progressing since symptoms were noticed on Saturday. Her symptoms were not improving so she came to the ED for further evaluation. In the ED, she was found to have an acute to subacute infarct and admitted to [facility] for further management. Review of Systems:  Review of Systems   Constitutional: Negative for chills and fever. HENT: Negative for ear pain and sore throat. Eyes: Negative for pain and visual disturbance. Respiratory: Negative for cough and shortness of breath. Cardiovascular: Negative for chest pain and palpitations. Gastrointestinal: Negative for abdominal pain and vomiting. Genitourinary: Negative for dysuria and hematuria. Musculoskeletal: Negative for arthralgias and back pain. Skin: Negative for color change and rash. Neurological: Positive for speech difficulty and weakness. Negative for seizures and syncope. All other systems reviewed and are negative.       Past Medical and Surgical History:   Past Medical History:   Diagnosis Date   • Arthritis    • Chronic kidney disease    • Endometriosis    • High cholesterol    • Hypertension    • Kidney disease, chronic, stage III (moderate, EGFR 30-59 ml/min) ([facility])    • Lumbar disc herniation    • Neuropathy    • Peripheral vascular disease ([facility])    • Pneumonia    • Shoulder injury     left   • Spinal stenosis    • Stroke ([address]) 2015    Memory loss       Past Surgical History:   Procedure Laterality Date   • CARDIAC CATHETERIZATION     • COLONOSCOPY     • FL RETROGRADE PYELOGRAM  4/6/2020   • FRACTURE SURGERY Right     ankle   • OVARIAN CYST SURGERY     • OK CYSTO BLADDER W/URETERAL CATHETERIZATION Bilateral 4/6/2020    Procedure: CYSTOSCOPY WITH RETROGRADE PYELOGRAM;  Surgeon: Maria R James MD;  [facility];  Service: Urology   • OK CYSTO W/INSERT URETERAL STENT Right 4/6/2020 Procedure: INSERTION STENT URETERAL;  Surgeon: Mary Oh MD;  [facility];  Service: Urology   • WI CYSTO W/REMOVAL OF TUMORS SMALL N/A 4/6/2020    Procedure: TRANSURETHRAL RESECTION OF BLADDER TUMOR (TURBT); Surgeon: Mary Oh MD;  [facility];  Service: Urology   • ROTATOR CUFF REPAIR Left    • TONSILLECTOMY         Meds/Allergies:  Prior to Admission medications    Medication Sig Start Date End Date Taking? Authorizing Provider   allopurinol (ZYLOPRIM) 100 mg tablet TAKE 1 TABLET BY MOUTH EVERY DAY 1/23/23   Vanna Woodruff MD   amLODIPine (NORVASC) 10 mg tablet Take 1 tablet (10 mg total) by mouth daily 5/15/23   Ovidio Pepper MD   cloNIDine (CATAPRES-TTS-3) 0.3 mg/24 hr PLACE 1 PATCH (0.3 MG TOTAL) ON THE SKIN ONCE A WEEK 5/1/23   Ovidio Pepper MD   clopidogrel (PLAVIX) 75 mg tablet TAKE 1 TABLET BY MOUTH EVERY DAY 2/6/23   Ovidio Pepper MD   Icosapent Ethyl (Vascepa) 1 g CAPS TAKE 2 CAPS BY MOUTH TWICE DAILY WITH FOOD 4/12/23   Ovidio Pepper MD   labetalol (NORMODYNE) 300 mg tablet Take 1 tablet (300 mg total) by mouth 2 (two) times a day 5/15/23   Ovidio Pepper MD   losartan (COZAAR) 100 MG tablet Take 1 tablet (100 mg total) by mouth daily 7/17/23   Ovidio Pepper MD   zinc oxide 20 % ointment Apply topically as needed for irritation 6/26/23   Ovidio Pepper MD     I have reviewed home medications using recent Epic encounter. Allergies:    Allergies   Allergen Reactions   • Fenofibrate Other (See Comments)      blood in urine  hx  Kidney Failure   • Colesevelam Other (See Comments)      leg pains   • Colestipol Itching and Other (See Comments)      Swelling lower legs   • Ezetimibe GI Intolerance   • Statins Myalgia       Social History:  Marital Status: /Civil Union   Occupation: na  Patient Pre-hospital Living Situation: Home  Patient Pre-hospital Level of Mobility: walks  Patient Pre-hospital Diet Restrictions: na  Substance Use History:   Social History     Substance and Sexual Activity   Alcohol Use Never     Social History     Tobacco Use   Smoking Status Former   • Packs/day: 2.00   • Years: 40.00   • Total pack years: 80.00   • Types: Cigarettes   • Start date: 1966   • Quit date: 7/27/2020   • Years since quitting: 3.0   Smokeless Tobacco Never     Social History     Substance and Sexual Activity   Drug Use Never       Family History:  Family History   Problem Relation Age of Onset   • Hypertension Mother    • Heart disease Mother         Valvular   • Hyperlipidemia Mother    • Hypertension Father    • Heart defect Father         Cardiomegaly   • Stroke Sister         Cerebrovascular Accident   • Arthritis Brother    • Other Brother         Back Disorder       Physical Exam:     Vitals:   Blood Pressure: (!) 200/84 (07/27/23 1700)  Pulse: 65 (07/27/23 1700)  Temperature: 98 °F (36.7 °C) (07/27/23 1137)  Respirations: 18 (07/27/23 1700)  SpO2: 98 % (07/27/23 1700)    Physical Exam  Constitutional:       General: She is not in acute distress. Appearance: Normal appearance. She is not toxic-appearing. Cardiovascular:      Rate and Rhythm: Normal rate and regular rhythm. Heart sounds: Normal heart sounds. No murmur heard. Pulmonary:      Effort: Pulmonary effort is normal. No respiratory distress. Breath sounds: Normal breath sounds. No wheezing. Abdominal:      General: Abdomen is flat. There is no distension. Palpations: Abdomen is soft. Tenderness: There is no abdominal tenderness. Neurological:      General: No focal deficit present. Mental Status: She is alert and oriented to person, place, and time. Motor: No weakness.           Additional Data:     Lab Results:  Results from last 7 days   Lab Units 07/27/23  1217   WBC Thousand/uL 8.52   HEMOGLOBIN g/dL 14.9   HEMATOCRIT % 42.4   PLATELETS Thousands/uL 249   NEUTROS PCT % 68   LYMPHS PCT % 20   MONOS PCT % 7   EOS PCT % 3     Results from last 7 days   Lab Units 07/27/23  1217   SODIUM mmol/L 138   POTASSIUM mmol/L 3.9   CHLORIDE mmol/L 103   CO2 mmol/L 25   BUN mg/dL 19   CREATININE mg/dL 1.54*   ANION GAP mmol/L 10   CALCIUM mg/dL 10.7*   GLUCOSE RANDOM mg/dL 267*                       Lines/Drains:  Invasive Devices     Peripheral Intravenous Line  Duration           Peripheral IV 07/27/23 Right Antecubital <1 day                    Imaging: Reviewed radiology reports from this admission including: CT head  CTA head and neck with and without contrast   Final Result by Graham Calderon MD (07/27 1442)      1. Hypodensity and loss of gray-white matter differentiation involving left occipital and inferior parietal lobe concerning for evolving acute or subacute infarct. 2.  Chronic microangiopathic change. 3.  High-grade stenosis of the left PCA at proximal P2 segment. Occluded versus hypoplastic left P1 segment with patent P-comm perfusing left PCA. 4.  Severe left and moderate right bilateral supraclinoid ICA stenosis. 5.  Severe stenosis of the left vertebral artery origin. Patent narrow caliber left vertebral artery in the cervical and pre-PICA intracranial segments with multifocal severe stenosis. Post PICA segment is occluded. 6.  Multifocal high-grade stenosis of the right intracranial vertebral artery. 7.  Advanced atherosclerotic change of bilateral cervical carotid arteries. Estimated 85-90% left and 80% right proximal ICA stenosis. 8.  Atherosclerotic disease of the aortic arch with linear defect at the origin of left subclavian artery. Differentials include ulcerated plaque versus focal dissection flap (likely chronic). I personally discussed this study with Ana Laura Davidson on 7/27/2023 2:22PM.            Workstation performed: [id number]         MRI Inpatient Order    (Results Pending)       EKG and Other Studies Reviewed on Admission:   · EKG: pending scan into epic chart.     ** Please Note: This note has been constructed using a voice recognition system.  **

## 2023-07-28 ENCOUNTER — TELEPHONE (OUTPATIENT)
Dept: INTERNAL MEDICINE CLINIC | Facility: CLINIC | Age: 78
End: 2023-07-28

## 2023-07-28 ENCOUNTER — APPOINTMENT (INPATIENT)
Dept: NON INVASIVE DIAGNOSTICS | Facility: HOSPITAL | Age: 78
DRG: 066 | End: 2023-07-28
Payer: COMMERCIAL

## 2023-07-28 ENCOUNTER — APPOINTMENT (INPATIENT)
Dept: VASCULAR ULTRASOUND | Facility: HOSPITAL | Age: 78
DRG: 066 | End: 2023-07-28
Payer: COMMERCIAL

## 2023-07-28 ENCOUNTER — TELEPHONE (OUTPATIENT)
Dept: CARDIOLOGY CLINIC | Facility: CLINIC | Age: 78
End: 2023-07-28

## 2023-07-28 ENCOUNTER — TELEPHONE (OUTPATIENT)
Dept: VASCULAR SURGERY | Facility: CLINIC | Age: 78
End: 2023-07-28

## 2023-07-28 PROBLEM — I65.22 SYMPTOMATIC CAROTID ARTERY STENOSIS, LEFT: Status: ACTIVE | Noted: 2020-03-03

## 2023-07-28 LAB
ANION GAP SERPL CALCULATED.3IONS-SCNC: 9 MMOL/L
ATRIAL RATE: 77 BPM
BUN SERPL-MCNC: 18 MG/DL (ref 5–25)
CALCIUM SERPL-MCNC: 10.6 MG/DL (ref 8.4–10.2)
CHLORIDE SERPL-SCNC: 105 MMOL/L (ref 96–108)
CHOLEST SERPL-MCNC: 368 MG/DL
CO2 SERPL-SCNC: 24 MMOL/L (ref 21–32)
CREAT SERPL-MCNC: 1.41 MG/DL (ref 0.6–1.3)
ERYTHROCYTE [DISTWIDTH] IN BLOOD BY AUTOMATED COUNT: 12.8 % (ref 11.6–15.1)
EST. AVERAGE GLUCOSE BLD GHB EST-MCNC: 217 MG/DL
GFR SERPL CREATININE-BSD FRML MDRD: 36 ML/MIN/1.73SQ M
GLUCOSE SERPL-MCNC: 146 MG/DL (ref 65–140)
GLUCOSE SERPL-MCNC: 173 MG/DL (ref 65–140)
GLUCOSE SERPL-MCNC: 174 MG/DL (ref 65–140)
GLUCOSE SERPL-MCNC: 234 MG/DL (ref 65–140)
GLUCOSE SERPL-MCNC: 263 MG/DL (ref 65–140)
HBA1C MFR BLD: 9.2 %
HCT VFR BLD AUTO: 43.2 % (ref 34.8–46.1)
HDLC SERPL-MCNC: 42 MG/DL
HGB BLD-MCNC: 15.2 G/DL (ref 11.5–15.4)
LDLC SERPL CALC-MCNC: 260 MG/DL (ref 0–100)
MAGNESIUM SERPL-MCNC: 1.8 MG/DL (ref 1.9–2.7)
MCH RBC QN AUTO: 30 PG (ref 26.8–34.3)
MCHC RBC AUTO-ENTMCNC: 35.2 G/DL (ref 31.4–37.4)
MCV RBC AUTO: 85 FL (ref 82–98)
P AXIS: 66 DEGREES
PLATELET # BLD AUTO: 251 THOUSANDS/UL (ref 149–390)
PMV BLD AUTO: 9.8 FL (ref 8.9–12.7)
POTASSIUM SERPL-SCNC: 3.6 MMOL/L (ref 3.5–5.3)
PR INTERVAL: 196 MS
QRS AXIS: -34 DEGREES
QRSD INTERVAL: 82 MS
QT INTERVAL: 392 MS
QTC INTERVAL: 443 MS
RBC # BLD AUTO: 5.06 MILLION/UL (ref 3.81–5.12)
SODIUM SERPL-SCNC: 138 MMOL/L (ref 135–147)
T WAVE AXIS: 97 DEGREES
TRIGL SERPL-MCNC: 329 MG/DL
VENTRICULAR RATE: 77 BPM
WBC # BLD AUTO: 9.46 THOUSAND/UL (ref 4.31–10.16)

## 2023-07-28 PROCEDURE — 93880 EXTRACRANIAL BILAT STUDY: CPT | Performed by: SURGERY

## 2023-07-28 PROCEDURE — 82948 REAGENT STRIP/BLOOD GLUCOSE: CPT

## 2023-07-28 PROCEDURE — 83735 ASSAY OF MAGNESIUM: CPT | Performed by: STUDENT IN AN ORGANIZED HEALTH CARE EDUCATION/TRAINING PROGRAM

## 2023-07-28 PROCEDURE — 99223 1ST HOSP IP/OBS HIGH 75: CPT | Performed by: PSYCHIATRY & NEUROLOGY

## 2023-07-28 PROCEDURE — 99232 SBSQ HOSP IP/OBS MODERATE 35: CPT | Performed by: NURSE PRACTITIONER

## 2023-07-28 PROCEDURE — 99223 1ST HOSP IP/OBS HIGH 75: CPT | Performed by: SURGERY

## 2023-07-28 PROCEDURE — 80048 BASIC METABOLIC PNL TOTAL CA: CPT | Performed by: STUDENT IN AN ORGANIZED HEALTH CARE EDUCATION/TRAINING PROGRAM

## 2023-07-28 PROCEDURE — 97163 PT EVAL HIGH COMPLEX 45 MIN: CPT

## 2023-07-28 PROCEDURE — 85027 COMPLETE CBC AUTOMATED: CPT | Performed by: STUDENT IN AN ORGANIZED HEALTH CARE EDUCATION/TRAINING PROGRAM

## 2023-07-28 PROCEDURE — 80061 LIPID PANEL: CPT | Performed by: STUDENT IN AN ORGANIZED HEALTH CARE EDUCATION/TRAINING PROGRAM

## 2023-07-28 PROCEDURE — 93010 ELECTROCARDIOGRAM REPORT: CPT | Performed by: INTERNAL MEDICINE

## 2023-07-28 PROCEDURE — 99223 1ST HOSP IP/OBS HIGH 75: CPT | Performed by: INTERNAL MEDICINE

## 2023-07-28 PROCEDURE — 93880 EXTRACRANIAL BILAT STUDY: CPT

## 2023-07-28 PROCEDURE — 92610 EVALUATE SWALLOWING FUNCTION: CPT

## 2023-07-28 PROCEDURE — 97167 OT EVAL HIGH COMPLEX 60 MIN: CPT

## 2023-07-28 PROCEDURE — 3046F HEMOGLOBIN A1C LEVEL >9.0%: CPT | Performed by: INTERNAL MEDICINE

## 2023-07-28 RX ORDER — LABETALOL HYDROCHLORIDE 5 MG/ML
20 INJECTION, SOLUTION INTRAVENOUS ONCE
Status: COMPLETED | OUTPATIENT
Start: 2023-07-28 | End: 2023-07-28

## 2023-07-28 RX ORDER — ASPIRIN 81 MG/1
81 TABLET, CHEWABLE ORAL DAILY
Status: DISCONTINUED | OUTPATIENT
Start: 2023-07-29 | End: 2023-07-29 | Stop reason: HOSPADM

## 2023-07-28 RX ORDER — PRAVASTATIN SODIUM 80 MG/1
80 TABLET ORAL
Status: DISCONTINUED | OUTPATIENT
Start: 2023-07-28 | End: 2023-07-29 | Stop reason: HOSPADM

## 2023-07-28 RX ORDER — ASPIRIN 325 MG
325 TABLET ORAL ONCE
Status: COMPLETED | OUTPATIENT
Start: 2023-07-28 | End: 2023-07-28

## 2023-07-28 RX ADMIN — TICAGRELOR 90 MG: 90 TABLET ORAL at 21:16

## 2023-07-28 RX ADMIN — INSULIN LISPRO 4 UNITS: 100 INJECTION, SOLUTION INTRAVENOUS; SUBCUTANEOUS at 21:16

## 2023-07-28 RX ADMIN — HEPARIN SODIUM 5000 UNITS: 5000 INJECTION INTRAVENOUS; SUBCUTANEOUS at 15:00

## 2023-07-28 RX ADMIN — LABETALOL HYDROCHLORIDE 300 MG: 100 TABLET, FILM COATED ORAL at 09:40

## 2023-07-28 RX ADMIN — CLOPIDOGREL BISULFATE 75 MG: 75 TABLET ORAL at 09:40

## 2023-07-28 RX ADMIN — INSULIN LISPRO 6 UNITS: 100 INJECTION, SOLUTION INTRAVENOUS; SUBCUTANEOUS at 12:50

## 2023-07-28 RX ADMIN — AMLODIPINE BESYLATE 10 MG: 10 TABLET ORAL at 09:40

## 2023-07-28 RX ADMIN — LOSARTAN POTASSIUM 100 MG: 50 TABLET, FILM COATED ORAL at 09:40

## 2023-07-28 RX ADMIN — OMEGA-3 FATTY ACIDS CAP 1000 MG 1000 MG: 1000 CAP at 09:40

## 2023-07-28 RX ADMIN — ASPIRIN 325 MG ORAL TABLET 325 MG: 325 PILL ORAL at 10:39

## 2023-07-28 RX ADMIN — HEPARIN SODIUM 5000 UNITS: 5000 INJECTION INTRAVENOUS; SUBCUTANEOUS at 05:55

## 2023-07-28 RX ADMIN — TICAGRELOR 180 MG: 90 TABLET ORAL at 10:40

## 2023-07-28 RX ADMIN — Medication 20 MG: at 02:02

## 2023-07-28 RX ADMIN — PRAVASTATIN SODIUM 80 MG: 80 TABLET ORAL at 17:00

## 2023-07-28 RX ADMIN — HEPARIN SODIUM 5000 UNITS: 5000 INJECTION INTRAVENOUS; SUBCUTANEOUS at 21:16

## 2023-07-28 RX ADMIN — LABETALOL HYDROCHLORIDE 300 MG: 100 TABLET, FILM COATED ORAL at 18:18

## 2023-07-28 NOTE — TELEPHONE ENCOUNTER
----- Message from Mukul Logan, Jose [facility] sent at 7/28/2023  2:47 PM EDT -----  Regarding: Hospital Follow-up  Cardiology Follow-up:    Patient clinical visit in 2 week (next available) at the [address] location: [facility] office. .    Schedule visit with Cardio [facility] Providers: Devan Webb. Type of Visit: VISIT TYPE: in-person office visit. Test ordered: Cardiac tests: stress test.    Additional Details: Preoperative evaluation for TCAR. Outpatient pharmacologic MPI ordered.

## 2023-07-28 NOTE — ASSESSMENT & PLAN NOTE
Lab Results   Component Value Date    HGBA1C 9.1 (H) 07/27/2023       Recent Labs     07/27/23  1734 07/27/23 2054 07/28/23  0701   POCGLU 215* 228* 173*       Blood Sugar Average: Last 72 hrs:  · (P) [id number] controlled diabetes  · Start sliding scale algorithm 4  · Monitor glucose levels  · Titrate as tolerated

## 2023-07-28 NOTE — PLAN OF CARE
Problem: PHYSICAL THERAPY ADULT  Goal: Performs mobility at highest level of function for planned discharge setting. See evaluation for individualized goals. Description: Treatment/Interventions: LE strengthening/ROM, Functional transfer training, Endurance training, Cognitive reorientation, Equipment eval/education, Bed mobility, Gait training, Spoke to nursing, Therapeutic exercise, Patient/family training  Equipment Recommended: Sheree Beard (continue RW use)       See flowsheet documentation for full assessment, interventions and recommendations. Note: Prognosis: Good  Problem List: Decreased strength, Impaired balance, Decreased endurance, Decreased mobility, Impaired sensation, Decreased cognition  Assessment: Pt is a 70year old presenting [facility] with CVA. PT orders were received for eval and treat. Current co-morbidities consist of: diabetes, HTN, spinal stenosis, basilar artery stenosis, stage 3 CKD, gout, and recurrent depression. Personal factors affecting subsequent therapy include: advanced age, chronic pre-existing disease, decreased caregiver support, and h/o depression. PTA, pt was I with ADLs and functional mobility. Pt with increased frustation with aphasia and was unable to verbalize comprehensive home set up and PLOF. Available information obtained from previous PT note on 1/24/2023. Please follow up with CM for current information. Impairments at time of eval consist of decreased strength, decreased endurance, impaired balance, decreased mobility, decreased cognition, and impaired sensation. Impairments listed require Min Ax1 for transfers, and MinAx1 for ambulation of 80 ft with RW and formal MMT showing symmetrical functional strength in LEs. Values can be referenced in the flowsheet. Objective measures taken at this time include AM-PAC score =17/24, JH-HLM=7/8, Modified Maggy=4, and Barthel Index=50/100. Upon arrival pt was pleasant and seated EOB.  Pt would benefit from continued skilled PT to address the above impairments to maximize functional independence and to facilitate return to PLOF. From a PT/mobility standpoint, d/c recommendation at this time would be to post acute rehab services. Barriers to Discharge: Decreased caregiver support     PT Discharge Recommendation: Post acute rehabilitation services    See flowsheet documentation for full assessment.

## 2023-07-28 NOTE — UTILIZATION REVIEW
Initial Clinical Review    Admission: Date/Time/Statement:   Admission Orders (From admission, onward)     Ordered        07/27/23 1555  Inpatient Admission  Once                      Orders Placed This Encounter   Procedures   • Inpatient Admission     Standing Status:   Standing     Number of Occurrences:   1     Order Specific Question:   Level of Care     Answer:   Level 1 Stepdown [13]     Order Specific Question:   Estimated length of stay     Answer:   More than 2 Midnights     Order Specific Question:   Certification     Answer:   I certify that inpatient services are medically necessary for this patient for a duration of greater than two midnights. See H&P and MD Progress Notes for additional information about the patient's course of treatment. ED Arrival Information     Expected   -    Arrival   7/27/2023 11:32    Acuity   Emergent            Means of arrival   Wheelchair    Escorted by   Family Member    Service   Hospitalist    Admission type   Emergency            Arrival complaint   AMS           Chief Complaint   Patient presents with   • Speech Problem     Speech problem since sat; family noticed aphasia and garbled speech, also R arm numbness x days; headaches, pt voices that she is a DNR        Initial Presentation: 70 y.o. female to ED from home w/ dysarthria, weakness, reduced short term memory that's been progressing since symptoms were noticed on Saturday. PMH of htn, hld, dm . IN Ed found to have acute to subacute infarct . Admitted IP status for CVA plan for neuro consult , plavix, tele . Out of TNK window , out of permissive HTN window. Resume home meds for HTN , add labetalol prn and monitor. DM SSI and monitor . 7/28 Neuro consult   MRI completed . Vascular consult p ending . DC plavix and start brilinta . Cont asa. freq neuro checks. Cont tele .      7/28 Vascular Consult   Severe bilateral ICA stenosis with large acute to subacute left PCA and small acute to subacute left frontal and parietal/temporal infarct. Continues with mild word finding difficulties, improving. Has significant improvement in right upper extremity weakness. witched to [facility] [address] . Cont asa. Started on pravachol . would benefit from left carotid revascularization, L TCAR. Echo pending . Cardiology consulted . Date: 7/28  Day 2: awaiting clearances for OP vascular surgery . Cardiology consulted for clearance for OP sx. Resume home BP meds and monitor .      ED Triage Vitals   Temperature Pulse Respirations Blood Pressure SpO2   07/27/23 1137 07/27/23 1137 07/27/23 1137 07/27/23 1137 07/27/23 1137   98 °F (36.7 °C) 74 16 (!) 195/80 97 %      Temp Source Heart Rate Source Patient Position - Orthostatic VS BP Location FiO2 (%)   07/27/23 1930 07/27/23 1732 07/27/23 1732 07/27/23 1732 --   Oral Monitor Lying Right arm       Pain Score       07/27/23 1732       No Pain          Wt Readings from Last 1 Encounters:   07/27/23 74.8 kg (165 lb)     Additional Vital Signs:   07/28/23 07:01:33 97.7 °F (36.5 °C) 80 -- 165/80 108 97 % -- --   07/28/23 0500 -- 84 -- 164/81 109 96 % -- --   07/28/23 04:56:57 -- 84 18 164/81 109 95 % -- --   07/28/23 0300 -- 63 18 153/81 105 96 % None (Room air) Lying   07/28/23 0100 -- 67 18 172/90 Abnormal  117 92 % None (Room air) Lying   07/27/23 2300 -- 64 18 187/95 Abnormal  126 95 % None (Room air) Lying   07/27/23 2130 97.9 °F (36.6 °C) 64 18 193/89 Abnormal  124 97 % -- Lying   07/27/23 2030 98.1 °F (36.7 °C) 66 18 166/93 117 97 % None (Room air) Lying   07/27/23 1930 97.8 °F (36.6 °C) 75 18 166/86 113 98 % -- Sitting   07/27/23 18:37:33 98 °F (36.7 °C) 72 -- 143/93 110 97 % -- --   07/27/23 1732 -- 69 18 207/81 Abnormal  116 97 % None (Room air) Lying   07/27/23 1700 -- 65 18 200/84 Abnormal  120 98 % -- --   07/27/23 1530 -- 75 18 190/77 Abnormal  110 99 % -- --   07/27/23 1345 -- 73 18 172/77 Abnormal  110 99 % -- --   07/27/23 1315 -- 76 19 198/77 Abnormal  110 98 % -- --   07/27/23 1230 -- 75 18 153/77 103 99 % -- --   07/27/23 1200 -- 70 18 156/65 93 98 % -- --       Pertinent Labs/Diagnostic Test Results:   7/28 Carotids RIGHT:  There is 70+% stenosis noted in the internal carotid artery. Plaque is  heterogenous/homogenous and irregular/smooth. Vertebral artery flow is antegrade. There is no significant subclavian artery  disease. LEFT:  There is 70+% stenosis noted in the internal carotid artery. Plaque is  heterogenous/homogenous and irregular/smooth. Vertebral artery flow is antegrade. There is no significant subclavian artery  disease. 7/27 Echo pending   7/27 EKG   Ventricular Rate BPM 77  61  63   63    Atrial Rate BPM 77  61  63   63    NH Interval ms 196  228  170  00:03:00 R  174    QRSD Interval ms 82  98  80   84    QT Interval ms 392  452  422   402    QTC Interval ms 443  455  431   411    P Axis degrees 66  84  53   64    QRS [place] degrees -34  -14  -10   2    T Wave Axis degrees 97  88  108   102        MRI brain wo contrast   Final Result by Ly Ozuna MD (07/27 1363)         1. Large area of acute to subacute ischemia left PCA distribution occipital lobe. 2. Small foci of acute to subacute ischemia in the left frontal and left parietal/temporal lobes MCA distribution. Findings were marked as "immediate"in Epic and will now be related to the ordering physician or covering clinical team by the radiology liaison. Workstation performed: [id number]         CTA head and neck with and without contrast   Final Result by Luisa Woods MD (07/27 3534)      1. Hypodensity and loss of gray-white matter differentiation involving left occipital and inferior parietal lobe concerning for evolving acute or subacute infarct. 2.  Chronic microangiopathic change. 3.  High-grade stenosis of the left PCA at proximal P2 segment. Occluded versus hypoplastic left P1 segment with patent P-comm perfusing left PCA.    4.  Severe left and moderate right bilateral supraclinoid ICA stenosis. 5.  Severe stenosis of the left vertebral artery origin. Patent narrow caliber left vertebral artery in the cervical and pre-PICA intracranial segments with multifocal severe stenosis. Post PICA segment is occluded. 6.  Multifocal high-grade stenosis of the right intracranial vertebral artery. 7.  Advanced atherosclerotic change of bilateral cervical carotid arteries. Estimated 85-90% left and 80% right proximal ICA stenosis. 8.  Atherosclerotic disease of the aortic arch with linear defect at the origin of left subclavian artery. Differentials include ulcerated plaque versus focal dissection flap (likely chronic).                            I personally discussed this study with Kingsley Mandujano on 7/27/2023 2:22PM.            Workstation performed: [id number]               Results from last 7 days   Lab Units 07/28/23  0455 07/27/23  1217   WBC Thousand/uL 9.46 8.52   HEMOGLOBIN g/dL 15.2 14.9   HEMATOCRIT % 43.2 42.4   PLATELETS Thousands/uL 251 249   NEUTROS ABS Thousands/µL  --  5.93     Results from last 7 days   Lab Units 07/28/23  0455 07/27/23  1217   SODIUM mmol/L 138 138   POTASSIUM mmol/L 3.6 3.9   CHLORIDE mmol/L 105 103   CO2 mmol/L 24 25   ANION GAP mmol/L 9 10   BUN mg/dL 18 19   CREATININE mg/dL 1.41* 1.54*   EGFR ml/min/1.73sq m 36 32   CALCIUM mg/dL 10.6* 10.7*   MAGNESIUM mg/dL 1.8*  --      Results from last 7 days   Lab Units 07/28/23  0701 07/27/23  2054 07/27/23  1734   POC GLUCOSE mg/dl 173* 228* 215*     Results from last 7 days   Lab Units 07/28/23  0455 07/27/23  1217   GLUCOSE RANDOM mg/dL 174* 267*     Results from last 7 days   Lab Units 07/27/23  1217   HEMOGLOBIN A1C % 9.1*   EAG mg/dl 214       Results from last 7 days   Lab Units 07/27/23  1739 07/27/23  1515 07/27/23  1217   HS TNI 0HR ng/L  --   --  7   HS TNI 2HR ng/L  --  8  --    HSTNI D2 ng/L  --  1  --    HS TNI 4HR ng/L 7  --   --    HSTNI D4 ng/L 0  --   --      ED Treatment:   Medication Administration from 07/27/2023 1132 to 07/27/2023 1831       Date/Time Order Dose Route Action     07/27/2023 1740 EDT labetalol (NORMODYNE) tablet 300 mg 300 mg Oral Given     07/27/2023 1744 EDT insulin lispro (HumaLOG) 100 units/mL subcutaneous injection 2-12 Units 4 Units Subcutaneous Given     07/27/2023 1739 EDT losartan (COZAAR) tablet 100 mg 100 mg Oral Given     07/27/2023 1742 EDT heparin (porcine) subcutaneous injection 5,000 Units 5,000 Units Subcutaneous Given        Past Medical History:   Diagnosis Date   • Arthritis    • Chronic kidney disease    • Endometriosis    • High cholesterol    • Hypertension    • Kidney disease, chronic, stage III (moderate, EGFR 30-59 ml/min) ([facility])    • Lumbar disc herniation    • Neuropathy    • Peripheral vascular disease ([facility])    • Pneumonia    • Shoulder injury     left   • Spinal stenosis    • Stroke ([address]) 2015    Memory loss     Present on Admission:  • Hypertensive urgency  • Diabetes ([address])      Admitting Diagnosis: Aphasia [R47.01]  CVA (cerebral vascular accident) ([address]) [I63.9]  Weakness of right arm [R29.898]  Dysarthria [R47.1]  Decreased sensation of hand and arm [R20.8]  AMS (altered mental status) [R41.82]  Age/Sex: 70 y.o. female  Admission Orders:  Scheduled Medications:  amLODIPine, 10 mg, Oral, Daily  cloNIDine, 1 patch, Transdermal, Weekly  clopidogrel, 75 mg, Oral, Daily  fish oil, 1,000 mg, Oral, Daily  heparin (porcine), 5,000 Units, Subcutaneous, Q8H [address]  insulin lispro, 2-12 Units, Subcutaneous, TID With Meals  insulin lispro, 2-12 Units, Subcutaneous, HS  labetalol, 300 mg, Oral, BID  losartan, 100 mg, Oral, Daily      Continuous IV Infusions:     PRN Meds:  labetalol, 10 mg, Intravenous, Q6H PRN  7/27 x1    Fingerstick ac and hs   PT OT ST   Tele   Neuro checks   Up and OOB     IP CONSULT TO NEUROLOGY  IP CONSULT TO CASE MANAGEMENT  IP CONSULT TO NUTRITION SERVICES    Network Utilization Review Department  ATTENTION: Please call with any questions or concerns to [phone] and carefully listen to the prompts so that you are directed to the right person. All voicemails are confidential.  Heidi Kirby all requests for admission clinical reviews, approved or denied determinations and any other requests to dedicated fax number below belonging to the campus where the patient is receiving treatment.  List of dedicated fax numbers for the Facilities:  [place] DENIALS (Administrative/Medical Necessity) [phone] [address] [facility] (Maternity/NICU/Pediatrics) [phone]   [address] [place] [address] [phone]   [facility] [address] [facility] [phone]   [facility] [address] [place] [address] [phone]   [address] [address] [facility] [address] [phone]

## 2023-07-28 NOTE — PLAN OF CARE
Problem: Potential for Falls  Goal: Patient will remain free of falls  Description: INTERVENTIONS:  - Educate patient/family on patient safety including physical limitations  - Instruct patient to call for assistance with activity   - Consult OT/PT to assist with strengthening/mobility   - Keep Call bell within reach  - Keep bed low and locked with side rails adjusted as appropriate  - Keep care items and personal belongings within reach  - Initiate and maintain comfort rounds  - Make Fall Risk Sign visible to staff  - Offer Toileting every    Hours, in advance of need  - Initiate/Maintain   alarm  - Obtain necessary fall risk management equipment:     - Apply yellow socks and bracelet for high fall risk patients  - Consider moving patient to room near nurses station  Outcome: Progressing     Problem: MOBILITY - ADULT  Goal: Maintain or return to baseline ADL function  Description: INTERVENTIONS:  -  Assess patient's ability to carry out ADLs; assess patient's baseline for ADL function and identify physical deficits which impact ability to perform ADLs (bathing, care of mouth/teeth, toileting, grooming, dressing, etc.)  - Assess/evaluate cause of self-care deficits   - Assess range of motion  - Assess patient's mobility; develop plan if impaired  - Assess patient's need for assistive devices and provide as appropriate  - Encourage maximum independence but intervene and supervise when necessary  - Involve family in performance of ADLs  - Assess for home care needs following discharge   - Consider OT consult to assist with ADL evaluation and planning for discharge  - Provide patient education as appropriate  Outcome: Progressing  Goal: Maintains/Returns to pre admission functional level  Description: INTERVENTIONS:  - Perform BMAT or MOVE assessment daily.   - Set and communicate daily mobility goal to care team and patient/family/caregiver.    - Collaborate with rehabilitation services on mobility goals if consulted  - Perform Range of Motion    times a day. - Reposition patient every    hours. - Dangle patient    times a day  - Stand patient    times a day  - Ambulate patient    times a day  - Out of bed to chair    times a day   - Out of bed for meals  times a day  - Out of bed for toileting  - Record patient progress and toleration of activity level   Outcome: Progressing     Problem: Nutrition/Hydration-ADULT  Goal: Nutrient/Hydration intake appropriate for improving, restoring or maintaining nutritional needs  Description: Monitor and assess patient's nutrition/hydration status for malnutrition. Collaborate with interdisciplinary team and initiate plan and interventions as ordered. Monitor patient's weight and dietary intake as ordered or per policy. Utilize nutrition screening tool and intervene as necessary. Determine patient's food preferences and provide high-protein, high-caloric foods as appropriate.      INTERVENTIONS:  - Monitor oral intake, urinary output, labs, and treatment plans  - Assess nutrition and hydration status and recommend course of action  - Evaluate amount of meals eaten  - Assist patient with eating if necessary   - Allow adequate time for meals  - Recommend/ encourage appropriate diets, oral nutritional supplements, and vitamin/mineral supplements  - Order, calculate, and assess calorie counts as needed  - Recommend, monitor, and adjust tube feedings and TPN/PPN based on assessed needs  - Assess need for intravenous fluids  - Provide specific nutrition/hydration education as appropriate  - Include patient/family/caregiver in decisions related to nutrition  Outcome: Progressing

## 2023-07-28 NOTE — ASSESSMENT & PLAN NOTE
Lab Results   Component Value Date    HGBA1C 9.1 (H) 07/27/2023    HGBA1C 9.2 (H) 07/27/2023       Recent Labs     07/27/23  1734 07/27/23 2054 07/28/23  0701 07/28/23  1121   POCGLU 215* 228* 173* 263*       Blood Sugar Average: Last 72 hrs:  (P) 219.75       -Optimize glucose control  -Per SLIM

## 2023-07-28 NOTE — ASSESSMENT & PLAN NOTE
70 y.o. right handed female with HLD, HTN, diabetes, GIST, obesity, aortoiliac stenosis, PVD, basilar artery stenosis, prior stroke who presents with speech difficulty, RUE decreased sensation and R hand clumsiness since 7/22/23. BP on arrival 195/80. CTA head and neck without contrast in ED with severe multivessel stenosis, occlusion versus hypoplastic left P1 segment, and hypodensity involving left occipital and left parietal. MRI brain wo contrast confirmed stroke. Pt on Plavix PTA. Suspect L MCA/PCA infarcts due to symptomatic L ICA disease. Imaging  - MRI brain wo contrast 7/27/23:  "1. Large area of acute to subacute ischemia left PCA distribution occipital lobe. 2. Small foci of acute to subacute ischemia in the left frontal and left parietal/temporal lobes MCA distribution. "  - CTA H/N wwo contrast 7/27/23:  "1. Hypodensity and loss of gray-white matter differentiation involving left occipital and inferior parietal lobe concerning for evolving acute or subacute infarct. 2.  Chronic microangiopathic change. 3.  High-grade stenosis of the left PCA at proximal P2 segment. Occluded versus hypoplastic left P1 segment with patent P-comm perfusing left PCA. 4.  Severe left and moderate right bilateral supraclinoid ICA stenosis. 5.  Severe stenosis of the left vertebral artery origin. Patent narrow caliber left vertebral artery in the cervical and pre-PICA intracranial segments with multifocal severe stenosis. Post PICA segment is occluded. 6.  Multifocal high-grade stenosis of the right intracranial vertebral artery. 7.  Advanced atherosclerotic change of bilateral cervical carotid arteries. Estimated 85-90% left and 80% right proximal ICA stenosis. 8.  Atherosclerotic disease of the aortic arch with linear defect at the origin of left subclavian artery. Differentials include ulcerated plaque versus focal dissection flap (likely chronic). "    Plan  - Stroke pathway  • MRI brain completed, see above • Echo as outpatient as per Cards  • Carotid ultrasound with >70% stenosis bilaterally; progressed from last CUS  • Vascular surgery consult - plan for TCAR in 2-4 weeks; discussed personally with Dr. Kristin Mendoza  • Labs  o Lipid Panel: Total cholesterol 368, triglycerides 329,   o Hemoglobin A1c 9.1  • D/c'd home Plavix 7/28/23  • Given  mg load x 1 and Brilinta 180 mg load x 1 on 7/28/23  • Start Brilinta 90 mg BID at 2100 on 7/28/23  • Start ASA 81 mg daily on 7/29/23  • Statins listed as allergy for patient (side effect of myalgia). Counseled pt at length with improtance of statin or initiation of PCSK9 inhibitor. Pt willing to try pravastatin 80 mg daily on 7/28/23 however agree with Vascular Surgery that a PCSK9 inhibitor would be ideal  o Per primary team, Ambrosio Sandifer is covered but requires prior auth - ok to continue current statin until she is able to obtain this as an outpatient  • Goal normotension as symptoms greater than 24 hours from onset  • Euglycemic, normothermic goal  • Continue telemetry  • PT/OT/ST  • Stroke education  • Frequent neuro checks. Continue to monitor and notify neurology with any changes. • STAT CT head for any acute change in neuro exam  • No further inpatient recommendations at this time; call with questions - will follow in stroke clinic as outpatient  - Medical management and supportive care per primary team. Correction of any metabolic or infectious disturbances.

## 2023-07-28 NOTE — PLAN OF CARE
Problem: OCCUPATIONAL THERAPY ADULT  Goal: Performs self-care activities at highest level of function for planned discharge setting. See evaluation for individualized goals. Description: Treatment Interventions: ADL retraining, Visual perceptual retraining, Functional transfer training, UE strengthening/ROM, Endurance training, Patient/family training          See flowsheet documentation for full assessment, interventions and recommendations. Outcome: Progressing  Note: Limitation: Decreased endurance, Visual deficit, Decreased sensation, Decreased fine motor control, Decreased self-care trans, Decreased high-level ADLs     Assessment: Pt is a 70 y.o. female seen for OT evaluation s/p admit to [facility] [place]27/[facility]23 w/ CVA (cerebral vascular accident) ([address]). Comorbidities affecting pt's functional performance at time of assessment include: DM, HTN, obesity, limited vision, previous surgery and CKD, expressive aphasia, hx of CVA. Personal factors affecting pt at time of IE include:limited home support, difficulty performing ADLS, difficulty performing IADLS , decreased initiation and engagement , health management , environment and inability to call for assisance in an emergency. Pt with difficulty describing PLOF due to expressive aphasia, but reports she was using a RW vs a w/c for mobility, and is primarily sedentary. Able to perform ADLs with independence. Upon evaluation: Pt requires supervision during ADLs and functional mobility with RW 2* the following deficits impacting occupational performance: decreased balance, decreased tolerance, impaired sensation, impaired memory, impaired problem solving and impaired vision. Pt to benefit from continued skilled OT tx while in the hospital to address deficits as defined above and maximize level of functional independence w ADL's and functional mobility.  Occupational Performance areas to address include: grooming, bathing/shower, toilet hygiene, dressing, health maintenance, functional mobility and community mobility. From OT standpoint, recommendation at time of d/c would be inpatient rehab vs home with [place] [facility] and nearly 24/7 supervision. Safety is a concern due to the pt's expressive aphasia/visual impairment and is unable to use phone at this time.      OT Discharge Recommendation: Post acute rehabilitation services (vs home with [place] [facility] and nearly 24/7 supervision)        Killian Hernandez OTR/L

## 2023-07-28 NOTE — CONSULTS
Consultation - Cardiology   Gilbert Lu 70 y.o. female MRN: [id number]  Unit/Bed#: -01 Encounter: [id number]  07/28/23  1:52 PM    Assessment/ Plan:  1. Preop risk assessment  • Patient being evaluated for possible TCAR  • Patient had acute CVA this admission. • Patient denies any chest pain or anginal equivalent however she notes she is not very mobile. • Patient has multivessel CAD with known  of RCA and moderate mid LAD stenosis. • Echo ordered. • Plan for outpatient pharmacologic MPI. • Further risk assessment once echo and pharmacologic MPI are completed. • Will arrange follow-up with primary cardiologist    2. CAD with known  RCA and moderate mid LAD stenosis  • Patient denies chest pain or anginal equivalent. • Continue aspirin, Brilinta, labetalol. • Patient started on pravastatin per vascular surgery. Patient has history of intolerance to statins. • Patient offered PCSK9 inhibitor in the past and has a history of hematuria on fenofibrate. 3. Diabetes  • A1c 9.2    4. Hypertension  • Continue amlodipine, clonidine, labetalol, and losartan    5. Hyperlipidemia  • Restarted on pravastatin 80 mg daily per vascular surgery    6. CVA  · Likely from atherosclerotic disease  · Vascular surgery following for eventual TCAR. History of Present Illness   Physician Requesting Consult: Darío Pascual MD    Reason for Consult / Principal Problem: CVA,    HPI: Gilbert Lu is a 70y.o. year old female who presented with CVA. Patient presented with speech difficulty and right arm weakness. Patient also reports decreased sensation to the dorsal aspect of her right arm as well as right hand clumsiness. She has been seen by vascular in the past and did not want interventions for claudication due to concern with contrast nephropathy     High-sensitivity troponins negative. Patient denies any chest pain or anginal:. Patient is not very active. Patient uses scooter to ambulate. Patient was tearful during conversation as she said that she used to be more mobile and able to care for self but she is very frustrated right now with her inability to express what she wants to. PMH:  CAD, HTN, HLD, DM, CKD, PAD, Tobacco use    - Cardiac catheterization 02/26/2020 showed 50-60% stenosis of mid LAD after origin of D2, 90% stenosis of proximal D2 and chronic total occlusion of proximal RCA. - MRI brain wo contrast 7/27/23:  "1. Large area of acute to subacute ischemia left PCA distribution occipital lobe. 2. Small foci of acute to subacute ischemia in the left frontal and left parietal/temporal lobes MCA distribution. "  - CTA H/N wwo contrast 7/27/23:  "1.  Hypodensity and loss of gray-white matter differentiation involving left occipital and inferior parietal lobe concerning for evolving acute or subacute infarct. 2.  Chronic microangiopathic change. 3.  High-grade stenosis of the left PCA at proximal P2 segment. Occluded versus hypoplastic left P1 segment with patent P-comm perfusing left PCA. 4.  Severe left and moderate right bilateral supraclinoid ICA stenosis. 5.  Severe stenosis of the left vertebral artery origin. Patent narrow caliber left vertebral artery in the cervical and pre-PICA intracranial segments with multifocal severe stenosis. Post PICA segment is occluded. 6.  Multifocal high-grade stenosis of the right intracranial vertebral artery. 7.  Advanced atherosclerotic change of bilateral cervical carotid arteries. Estimated 85-90% left and 80% right proximal ICA stenosis. 8.  Atherosclerotic disease of the aortic arch with linear defect at the origin of left subclavian artery. Differentials include ulcerated plaque versus focal dissection flap (likely chronic). "      Inpatient consult to Cardiology  Consult performed by:  GRACIELA Maldonado  Consult ordered by: GRACIELA Robertson          EKG: Normal sinus rhythm, left axis deviation, left ventricular hypertrophy with repolarization abnormality, inferior infarct, anterior infarct    Family History: Mother- HTN, valvular heart disease, hyperlipidemia  Father- HTN, cardiomegaly  Sister-CVA  Brother- Arthritis     Social history: Former smoker, stopped 4 years ago. No ETOH    Review of Systems   Constitutional: Negative for chills, diaphoresis and fever. Respiratory: Negative for cough, chest tightness and shortness of breath. Cardiovascular: Negative for chest pain, palpitations and leg swelling. Gastrointestinal: Negative for abdominal distention, blood in stool, nausea and vomiting. Genitourinary: Negative for difficulty urinating. Musculoskeletal: Positive for gait problem. Negative for arthralgias and back pain. Neurological: Positive for speech difficulty. Negative for dizziness, syncope, light-headedness and headaches. Psychiatric/Behavioral: Negative for agitation and confusion. The patient is not nervous/anxious.         Historical Information   Past Medical History:   Diagnosis Date   • Arthritis    • Chronic kidney disease    • Endometriosis    • High cholesterol    • Hypertension    • Kidney disease, chronic, stage III (moderate, EGFR 30-59 ml/min) ([facility])    • Lumbar disc herniation    • Neuropathy    • Peripheral vascular disease ([facility])    • Pneumonia    • Shoulder injury     left   • Spinal stenosis    • Stroke ([address]) 2015    Memory loss     Past Surgical History:   Procedure Laterality Date   • CARDIAC CATHETERIZATION     • COLONOSCOPY     • FL RETROGRADE PYELOGRAM  4/6/2020   • FRACTURE SURGERY Right     ankle   • OVARIAN CYST SURGERY     • WY CYSTO BLADDER W/URETERAL CATHETERIZATION Bilateral 4/6/2020    Procedure: CYSTOSCOPY WITH RETROGRADE PYELOGRAM;  Surgeon: Can Braden MD;  [facility];  Service: Urology   • WY CYSTO W/INSERT URETERAL STENT Right 4/6/2020    Procedure: INSERTION STENT URETERAL;  Surgeon: Can Braden MD;  [facility];  Service: Urology   • WY CYSTO W/REMOVAL OF TUMORS SMALL N/A 4/6/2020    Procedure: TRANSURETHRAL RESECTION OF BLADDER TUMOR (TURBT);   Surgeon: Irena Burnham MD;  [facility];  Service: Urology   • ROTATOR CUFF REPAIR Left    • TONSILLECTOMY       Social History     Substance and Sexual Activity   Alcohol Use Never     Social History     Substance and Sexual Activity   Drug Use Never     Social History     Tobacco Use   Smoking Status Former   • Packs/day: 2.00   • Years: 40.00   • Total pack years: 80.00   • Types: Cigarettes   • Start date: 1   • Quit date: 7/27/2020   • Years since quitting: 3.0   Smokeless Tobacco Never       Family History:   Family History   Problem Relation Age of Onset   • Hypertension Mother    • Heart disease Mother         Valvular   • Hyperlipidemia Mother    • Hypertension Father    • Heart defect Father         Cardiomegaly   • Stroke Sister         Cerebrovascular Accident   • Arthritis Brother    • Other Brother         Back Disorder       Meds/Allergies   all current active meds have been reviewed and current meds:   Current Facility-Administered Medications   Medication Dose Route Frequency   • amLODIPine (NORVASC) tablet 10 mg  10 mg Oral Daily   • [START ON 7/29/2023] aspirin chewable tablet 81 mg  81 mg Oral Daily   • cloNIDine (CATAPRES-TTS-3) 0.3 mg/24 hr TD weekly patch  1 patch Transdermal Weekly   • fish oil capsule 1,000 mg  1,000 mg Oral Daily   • heparin (porcine) subcutaneous injection 5,000 Units  5,000 Units Subcutaneous Q8H [facility]   • insulin lispro (HumaLOG) 100 units/mL subcutaneous injection 2-12 Units  2-12 Units Subcutaneous TID With Meals   • insulin lispro (HumaLOG) 100 units/mL subcutaneous injection 2-12 Units  2-12 Units Subcutaneous HS   • labetalol (NORMODYNE) injection 10 mg  10 mg Intravenous Q6H PRN   • labetalol (NORMODYNE) tablet 300 mg  300 mg Oral BID   • losartan (COZAAR) tablet 100 mg  100 mg Oral Daily   • pravastatin (PRAVACHOL) tablet 80 mg  80 mg Oral Daily With Dinner   • ticagrelor (BRILINTA) tablet 90 mg  90 mg Oral Q12H [address]     Allergies   Allergen Reactions   • Fenofibrate Other (See Comments)      blood in urine  hx  Kidney Failure   • Colesevelam Other (See Comments)      leg pains   • Colestipol Itching and Other (See Comments)      Swelling lower legs   • Ezetimibe GI Intolerance   • Statins Myalgia       Objective   Vitals: Blood pressure 157/58, pulse 68, temperature 97.6 °F (36.4 °C), resp. rate 18, SpO2 97 %. , There is no height or weight on file to calculate BMI.,   Orthostatic Blood Pressures    Flowsheet Row Most Recent Value   Blood Pressure 157/58 filed at 07/28/2023 1121   Patient Position - Orthostatic VS Lying filed at 07/28/2023 0161          Systolic (71JLR), FGS:410 , Min:143 , KCB:213     Diastolic (78TCI), QHV:13, Min:58, Max:95      No intake or output data in the 24 hours ending 07/28/23 1352    Invasive Devices     Peripheral Intravenous Line  Duration           Peripheral IV 07/27/23 Right Antecubital 1 day                    Physical Exam:  Physical Exam  Vitals and nursing note reviewed. Constitutional:       General: She is not in acute distress. Appearance: She is well-developed. HENT:      Head: Normocephalic and atraumatic. Eyes:      Conjunctiva/sclera: Conjunctivae normal.   Neck:      Vascular: No carotid bruit. Cardiovascular:      Rate and Rhythm: Normal rate and regular rhythm. Heart sounds: Normal heart sounds. No murmur heard. Pulmonary:      Effort: Pulmonary effort is normal. No respiratory distress. Breath sounds: Normal breath sounds. Abdominal:      Palpations: Abdomen is soft. Tenderness: There is no abdominal tenderness. Musculoskeletal:         General: No swelling. Cervical back: Neck supple. Right lower leg: No edema. Left lower leg: No edema. Skin:     General: Skin is warm and dry. Capillary Refill: Capillary refill takes less than 2 seconds.    Neurological: Mental Status: She is alert and oriented to person, place, and time. Psychiatric:         Mood and Affect: Mood normal. Affect is tearful.           Lab Results:     Cardiac Profile:   Results from last 7 days   Lab Units 07/27/23  1739 07/27/23  1515 07/27/23  1217   HS TNI 0HR ng/L  --   --  7   HS TNI 2HR ng/L  --  8  --    HSTNI D2 ng/L  --  1  --    HS TNI 4HR ng/L 7  --   --    HSTNI D4 ng/L 0  --   --        CBC with diff:   Results from last 7 days   Lab Units 07/28/23  0455 07/27/23  1217   WBC Thousand/uL 9.46 8.52   HEMOGLOBIN g/dL 15.2 14.9   HEMATOCRIT % 43.2 42.4   MCV fL 85 86   PLATELETS Thousands/uL 251 249   RBC Million/uL 5.06 4.95   MCH pg 30.0 30.1   MCHC g/dL 35.2 35.1   RDW % 12.8 12.8   MPV fL 9.8 9.7   NRBC AUTO /100 WBCs  --  0         CMP:   Results from last 7 days   Lab Units 07/28/23  0455 07/27/23  1217   POTASSIUM mmol/L 3.6 3.9   CHLORIDE mmol/L 105 103   CO2 mmol/L 24 25   BUN mg/dL 18 19   CREATININE mg/dL 1.41* 1.54*   CALCIUM mg/dL 10.6* 10.7*   EGFR ml/min/1.73sq m 36 32

## 2023-07-28 NOTE — ASSESSMENT & PLAN NOTE
-Presented with word finding difficulties, right-sided weakness  -Stroke pathway  -MRI of the brain with a large area of acute/subacute ischemia left PCA distribution occipital lobe and left acute/subacute frontal and parietal infarct  -Started on Brilinta  -Intolerant to statins. Due to severe myalgias. Started on pravastatin  -Neurology following   -Significant bilateral carotid stenosis.   Planning left TCAR

## 2023-07-28 NOTE — ASSESSMENT & PLAN NOTE
· Present on admission  · Resumed home meds  · Still requiring multiple doses of IV PRNs overnight 7/27/2023  · Monitor blood pressure

## 2023-07-28 NOTE — CONSULTS
Consultation - Neurology   Myriam Winter 70 y.o. female MRN: [id number]  Unit/Bed#: -01 Encounter: [id number]      Assessment/Plan     * CVA (cerebral vascular accident) [facility])  Assessment & Plan  70 y.o. right handed female with HLD, HTN, diabetes, GIST, obesity, aortoiliac stenosis, PVD, basilar artery stenosis, prior stroke who presents with speech difficulty, RUE decreased sensation and R hand clumsiness since 7/22/23. BP on arrival 195/80. CTA head and neck without contrast in ED with severe multivessel stenosis, occlusion versus hypoplastic left P1 segment, and hypodensity involving left occipital and left parietal. MRI brain wo contrast confirmed stroke. Pt on Plavix PTA. Imaging  - MRI brain wo contrast 7/27/23:  "1. Large area of acute to subacute ischemia left PCA distribution occipital lobe. 2. Small foci of acute to subacute ischemia in the left frontal and left parietal/temporal lobes MCA distribution. "  - CTA H/N wwo contrast 7/27/23:  "1. Hypodensity and loss of gray-white matter differentiation involving left occipital and inferior parietal lobe concerning for evolving acute or subacute infarct. 2.  Chronic microangiopathic change. 3.  High-grade stenosis of the left PCA at proximal P2 segment. Occluded versus hypoplastic left P1 segment with patent P-comm perfusing left PCA. 4.  Severe left and moderate right bilateral supraclinoid ICA stenosis. 5.  Severe stenosis of the left vertebral artery origin. Patent narrow caliber left vertebral artery in the cervical and pre-PICA intracranial segments with multifocal severe stenosis. Post PICA segment is occluded. 6.  Multifocal high-grade stenosis of the right intracranial vertebral artery. 7.  Advanced atherosclerotic change of bilateral cervical carotid arteries. Estimated 85-90% left and 80% right proximal ICA stenosis. 8.  Atherosclerotic disease of the aortic arch with linear defect at the origin of left subclavian artery.  Differentials include ulcerated plaque versus focal dissection flap (likely chronic). "    Plan  - Stroke pathway  • MRI brain completed, see above   • Echo pending   • Carotid ultrasound pending  • Vascular surgery consult pending  • Labs  o Lipid Panel: Total cholesterol 368, triglycerides 329,   o Hemoglobin A1c 9.1  • D/c'd home Plavix 7/28/23  • Given  mg load x 1 and Brilinta 180 mg load x 1 on 7/28/23  • Start Brilinta 90 mg BID at 2100 on 7/28/23  • Start ASA 81 mg daily on 7/29/23  • Statins listed as allergy for patient (side effect of myalgia). Counseled pt at length with improtance of statin or initiation of PCSK9 inhibitor. Pt willing to try pravastatin 80 mg daily on 7/28/23  • Goal normotension as symptoms greater than 24 hours from onset  • Euglycemic, normothermic goal  • Continue telemetry  • PT/OT/ST  • Stroke education  • Frequent neuro checks. Continue to monitor and notify neurology with any changes. • STAT CT head for any acute change in neuro exam  - Medical management and supportive care per primary team. Correction of any metabolic or infectious disturbances. Kin Johnson will need follow up in in 6 weeks with neurovascular attending or advance practitioner. She will not require outpatient neurological testing. Case and plan discussed with attending neurologist.  Please see attending attestation for any further recommendations and/or changes to plan. History of Present Illness     Reason for Consult / Principal Problem: stroke  Hx and PE limited by: N/A; pt with expressive aphasia but pt's son in room and able to supplement history  HPI: Kin Johnson is a 70 y.o. right handed female with HLD, HTN, diabetes, GIST, obesity, aortoiliac stenosis, PVD, basilar artery stenosis, prior stroke who presents with speech difficulty, RUE decreased sensation and R hand clumsiness since 7/22/23. BP on arrival 195/80.   CTA head and neck without contrast in ED with severe multivessel stenosis, occlusion versus hypoplastic left P1 segment, and hypodensity involving left occipital and left parietal. MRI brain wo contrast confirmed stroke. Pt on Plavix PTA. Patient and patient's unable to provide history. Patient's son reports he noticed patient with difficulty with speech and right handed clumsiness starting on 7/22/2023. Today, patient reports continuing with expressive aphasia. Patient also reports some difficulty with using right hand. Patient notes she was able to feed herself toast for breakfast with her right hand. Patient reports she has been able to ambulate to the restroom in the room with rolling walker. Pt denies headache. Pt denies SOB. Pt denies chest pain. Patient lives with her son at home. Patient manages her own medications. When patient asked if she has missed any doses of home Plavix, patient states "not on purpose."  When tried to ask further details regarding patient's answer, patient with difficulty explaining any missed doses of Plavix due to expressive aphasia. Patient reports she tried taking statins multiple years ago, estimated about 20 years ago. Patient reports when she took statins in the past, she had muscle aches and pains. Patient reports she is not taking statin since. At baseline, patient able to ambulate without assistive device. Patient does not drive. Inpatient consult to Neurology  Consult performed by: Katie Morelos PA-C  Consult ordered by: Maritza Perales MD          Review of Systems   Reason unable to perform ROS: somewhat limited due to expressive aphasia    HENT: Negative for congestion and trouble swallowing. Eyes: Positive for visual disturbance (per pt's son ). Negative for photophobia. Respiratory: Negative for shortness of breath. Cardiovascular: Negative for chest pain. Musculoskeletal: Positive for gait problem (ambulating to and from bathroom in room with RW).    Neurological: Positive for speech difficulty and weakness (difficulty with dexterity in R hand ). Negative for headaches. Historical Information   Past Medical History:   Diagnosis Date   • Arthritis    • Chronic kidney disease    • Endometriosis    • High cholesterol    • Hypertension    • Kidney disease, chronic, stage III (moderate, EGFR 30-59 ml/min) ([facility])    • Lumbar disc herniation    • Neuropathy    • Peripheral vascular disease ([facility])    • Pneumonia    • Shoulder injury     left   • Spinal stenosis    • Stroke ([address]) 2015    Memory loss     Past Surgical History:   Procedure Laterality Date   • CARDIAC CATHETERIZATION     • COLONOSCOPY     • FL RETROGRADE PYELOGRAM  4/6/2020   • FRACTURE SURGERY Right     ankle   • OVARIAN CYST SURGERY     • OR CYSTO BLADDER W/URETERAL CATHETERIZATION Bilateral 4/6/2020    Procedure: CYSTOSCOPY WITH RETROGRADE PYELOGRAM;  Surgeon: Ismael Dent MD;  [facility];  Service: Urology   • OR CYSTO W/INSERT URETERAL STENT Right 4/6/2020    Procedure: INSERTION STENT URETERAL;  Surgeon: Ismael Dent MD;  [facility];  Service: Urology   • OR CYSTO W/REMOVAL OF TUMORS SMALL N/A 4/6/2020    Procedure: TRANSURETHRAL RESECTION OF BLADDER TUMOR (TURBT);   Surgeon: Ismael Dent MD;  [facility];  Service: Urology   • ROTATOR CUFF REPAIR Left    • TONSILLECTOMY       Social History   Social History     Substance and Sexual Activity   Alcohol Use Never     Social History     Substance and Sexual Activity   Drug Use Never     E-Cigarette/Vaping   • E-Cigarette Use Never User      E-Cigarette/Vaping Substances   • Nicotine No    • THC No    • CBD No    • Flavoring No    • Other No    • Unknown No      Social History     Tobacco Use   Smoking Status Former   • Packs/day: 2.00   • Years: 40.00   • Total pack years: 80.00   • Types: Cigarettes   • Start date: 1966   • Quit date: 7/27/2020   • Years since quitting: 3.0   Smokeless Tobacco Never     Family History:   Family History Problem Relation Age of Onset   • Hypertension Mother    • Heart disease Mother         Valvular   • Hyperlipidemia Mother    • Hypertension Father    • Heart defect Father         Cardiomegaly   • Stroke Sister         Cerebrovascular Accident   • Arthritis Brother    • Other Brother         Back Disorder       Review of previous medical records was  completed. Meds/Allergies   all current active meds have been reviewed, current meds:   Current Facility-Administered Medications   Medication Dose Route Frequency   • amLODIPine (NORVASC) tablet 10 mg  10 mg Oral Daily   • [START ON 7/29/2023] aspirin chewable tablet 81 mg  81 mg Oral Daily   • cloNIDine (CATAPRES-TTS-3) 0.3 mg/24 hr TD weekly patch  1 patch Transdermal Weekly   • fish oil capsule 1,000 mg  1,000 mg Oral Daily   • heparin (porcine) subcutaneous injection 5,000 Units  5,000 Units Subcutaneous Q8H [address]   • insulin lispro (HumaLOG) 100 units/mL subcutaneous injection 2-12 Units  2-12 Units Subcutaneous TID With Meals   • insulin lispro (HumaLOG) 100 units/mL subcutaneous injection 2-12 Units  2-12 Units Subcutaneous HS   • labetalol (NORMODYNE) injection 10 mg  10 mg Intravenous Q6H PRN   • labetalol (NORMODYNE) tablet 300 mg  300 mg Oral BID   • losartan (COZAAR) tablet 100 mg  100 mg Oral Daily   • pravastatin (PRAVACHOL) tablet 80 mg  80 mg Oral Daily With Dinner   • ticagrelor (BRILINTA) tablet 90 mg  90 mg Oral Q12H [address]    and PTA meds:   Prior to Admission Medications   Prescriptions Last Dose Informant Patient Reported? Taking?    Icosapent Ethyl (Vascepa) 1 g CAPS   No No   Sig: TAKE 2 CAPS BY MOUTH TWICE DAILY WITH FOOD   allopurinol (ZYLOPRIM) 100 mg tablet   No No   Sig: TAKE 1 TABLET BY MOUTH EVERY DAY   amLODIPine (NORVASC) 10 mg tablet   No No   Sig: Take 1 tablet (10 mg total) by mouth daily   cloNIDine (CATAPRES-TTS-3) 0.3 mg/24 hr   No No   Sig: PLACE 1 PATCH (0.3 MG TOTAL) ON THE SKIN ONCE A WEEK   clopidogrel (PLAVIX) 75 mg tablet No No   Sig: TAKE 1 TABLET BY MOUTH EVERY DAY   labetalol (NORMODYNE) 300 mg tablet   No No   Sig: Take 1 tablet (300 mg total) by mouth 2 (two) times a day   losartan (COZAAR) 100 MG tablet   No No   Sig: Take 1 tablet (100 mg total) by mouth daily   zinc oxide 20 % ointment   No No   Sig: Apply topically as needed for irritation      Facility-Administered Medications: None       Allergies   Allergen Reactions   • Fenofibrate Other (See Comments)      blood in urine  hx  Kidney Failure   • Colesevelam Other (See Comments)      leg pains   • Colestipol Itching and Other (See Comments)      Swelling lower legs   • Ezetimibe GI Intolerance   • Statins Myalgia       Objective   Vitals:Blood pressure 157/58, pulse 68, temperature 97.6 °F (36.4 °C), resp. rate 18, SpO2 97 %. ,There is no height or weight on file to calculate BMI. No intake or output data in the 24 hours ending 07/28/23 1228    Invasive Devices: Invasive Devices     Peripheral Intravenous Line  Duration           Peripheral IV 07/27/23 Right Antecubital 1 day                Physical Exam  Vitals and nursing note reviewed. Exam conducted with a chaperone present (pt's son). Constitutional:       General: She is not in acute distress. Appearance: She is not diaphoretic. HENT:      Head: Normocephalic and atraumatic. Mouth/Throat:      Mouth: Mucous membranes are moist.   Eyes:      General: No scleral icterus. Right eye: No discharge. Left eye: No discharge. Extraocular Movements: Extraocular movements intact and EOM normal.      Conjunctiva/sclera: Conjunctivae normal.   Cardiovascular:      Rate and Rhythm: Normal rate. Pulmonary:      Effort: Pulmonary effort is normal.   Neurological:      Mental Status: She is alert.       Coordination: Finger-Nose-Finger Test normal.   Psychiatric:      Comments: Pleasant and cooperative during exam        Neurologic Exam     Mental Status   Follows 1 step (unable to follow 2 step cross body commands ) commands. Attention: appropriately attends to provider  Concentration: no redirection required during examination    Speech: (No dysarthria appreciated on exam. Expressive aphasia appreciated on exam )  Level of consciousness: alert  Unable to name object: able to name gloves, glasses, unable to identify watch  Able to read. Unable to repeat: able to repeat "They heard him speak on the radio last night." Unable to repeat "traffic conditions are expected to be heavy today."  Abnormal comprehension. -oriented to self   -oriented to month, day of week, and year   -Oriented to place "[facility]"     Cranial Nerves     CN III, IV, VI   Extraocular motions are normal.   Nystagmus: none   Conjugate gaze: present    CN V   Facial sensation intact. Right facial sensation deficit: none  Left facial sensation deficit: none    CN VII   Facial expression full, symmetric.    Right facial weakness: none  Left facial weakness: none    CN VIII   Hearing impaired: grossly intact     CN XI   Right trapezius strength: normal  Left trapezius strength: normal    CN XII   CN XII normal.   Tongue: not atrophic  Fasciculations: absent  Tongue deviation: none  - R homonomous hemianopsia      Motor Exam   Muscle bulk: normal  Overall muscle tone: normal Strength to confrontation testing:  -Bilateral hand  5/5   -bilateral elbow flexion and extension 5/5   -Bilateral shoulder abduction 5/5   -Bilateral plantar flexion and dorsiflexion 5/5   -bilateral knee flexion and extension 5/5   -L hip flexion 4+/5  - R hip flexion 5-/5     Sensory Exam   Light touch normal.   Right arm light touch: normal  Left arm light touch: normal  Right leg light touch: normal  Left leg light touch: normal  Pinprick normal.   Right arm pinprick: normal  Left arm pinprick: normal  Right leg pinprick: normal  Left leg pinprick: normal    Gait, Coordination, and Reflexes     Gait  Gait: (deferred for patient safety)    Coordination   Finger to nose coordination: normal      Lab Results:   I have personally reviewed pertinent reports. , CBC:   Results from last 7 days   Lab Units 07/28/23  0455 07/27/23  1217   WBC Thousand/uL 9.46 8.52   RBC Million/uL 5.06 4.95   HEMOGLOBIN g/dL 15.2 14.9   HEMATOCRIT % 43.2 42.4   MCV fL 85 86   PLATELETS Thousands/uL 251 249   , BMP/CMP:   Results from last 7 days   Lab Units 07/28/23  0455 07/27/23  1217   SODIUM mmol/L 138 138   POTASSIUM mmol/L 3.6 3.9   CHLORIDE mmol/L 105 103   CO2 mmol/L 24 25   BUN mg/dL 18 19   CREATININE mg/dL 1.41* 1.54*   CALCIUM mg/dL 10.6* 10.7*   EGFR ml/min/1.73sq m 36 32   , Vitamin B12:   , HgBA1C:   Results from last 7 days   Lab Units 07/27/23  1217   HEMOGLOBIN A1C % 9.2*  9.1*   , TSH:   , Coagulation:   , Lipid Profile:   Results from last 7 days   Lab Units 07/28/23  0455   HDL mg/dL 42*   LDL CALC mg/dL 260*   TRIGLYCERIDES mg/dL 329*   , Ammonia:   , Urinalysis:       Invalid input(s): "URIBILINOGEN", Drug Screen:   , Medication Drug Levels:       Invalid input(s): "CARBAMAZEPINE", "LACOSAMIDE", "OXCARBAZEPINE"  Imaging Studies: I have personally reviewed pertinent reports. and I have personally reviewed pertinent films in PACS MRI brain without contrast 7/27/2023, CTA contrast with and without contrast 7/27/2023  EKG, Pathology, and Other Studies: I have personally reviewed pertinent reports.     VTE Prophylaxis: Heparin    Code Status: Level 1 - Full Code  Advance Directive and Living Will:      Power of :    POLST:      Counseling / Coordination of Care  I have spent a total time of 60 minutes on 07/28/23 in caring for this patient including Diagnostic results, Prognosis, Risks and benefits of tx options, Instructions for management, Patient and family education, Importance of tx compliance, Risk factor reductions, Impressions, Counseling / Coordination of care, Documenting in the medical record, Reviewing / ordering tests, medicine, procedures  , Obtaining or reviewing history   and Communicating with other healthcare professionals . This note was completed in part utilizing [address] [facility]. Grammatical errors, random word insertions, spelling mistakes, and incomplete sentences may be an occasional consequence of this system secondary to software limitations, ambient noise, and hardware issues. If you have any questions or concerns about the content, text, or information contained within the body of this dictation, please contact the provider for clarification.

## 2023-07-28 NOTE — TELEPHONE ENCOUNTER
From: Veronica Adames   Sent: Friday, July 28, 2023 2:34 PM  To: Theresa Began <Braxton [email or web address]; Chung Abad <Darryn [email or web address]; Nely Murillo <Jen [email or web address]; Kayla Alvarado <Perry [email or web address]  Cc: Brenda SIM <Mayur [email or web address]  Subject: RE: [facility] follow up and surgical planning     Surgery being held for Dr. Larae Ahumada 8/17/23. Ruben Laboy      From: Theresa Began <Braxton [email or web address]   Sent: Friday, July 28, 2023 1:26 PM  To: Chung Abad <Darryn [email or web address]; Nely Murillo <Jen [email or web address]; Kayla Alvarado <Perry [email or web address]  Cc: Veronica Adames <[email or web address]; Brenda SIM <Mayur [email or web address]  Subject: [facility] follow up and surgical planning     Hello,     I saw a consult on Nathaly Almonte 6/15/47 for symptomatic L ICA stenosis. Recommended L  TCAR in the next 4-6 weeks. Can you set patient up with an appointment with a vascular surgeon in the next 2 weeks and possibly hold a surgical date in the next 4-6 weeks. She will see cardiology inpatient for clearance.      Thank you,   Olman Frazier

## 2023-07-28 NOTE — OCCUPATIONAL THERAPY NOTE
Occupational Therapy Evaluation     Patient Name: Imtiaz Auguste  TJFSU'D Date: 7/28/2023      Problem List  Principal Problem:    CVA (cerebral vascular accident) [facility])  Active Problems:    Symptomatic carotid artery stenosis, left    Diabetes ([address] )    Hypertensive urgency    Past Medical History  Past Medical History:   Diagnosis Date    Arthritis     Chronic kidney disease     Endometriosis     High cholesterol     Hypertension     Kidney disease, chronic, stage III (moderate, EGFR 30-59 ml/min) ([facility])     Lumbar disc herniation     Neuropathy     Peripheral vascular disease ([facility])     Pneumonia     Shoulder injury     left    Spinal stenosis     Stroke ([address]) 2015    Memory loss     Past Surgical History  Past Surgical History:   Procedure Laterality Date    CARDIAC CATHETERIZATION      COLONOSCOPY      FL RETROGRADE PYELOGRAM  4/6/2020    FRACTURE SURGERY Right     ankle    OVARIAN CYST SURGERY      AL CYSTO BLADDER W/URETERAL CATHETERIZATION Bilateral 4/6/2020    Procedure: CYSTOSCOPY WITH RETROGRADE PYELOGRAM;  Surgeon: Ab Bianchi MD;  [facility];  Service: Urology    AL CYSTO W/INSERT URETERAL STENT Right 4/6/2020    Procedure: INSERTION STENT URETERAL;  Surgeon: Ab Bianchi MD;  [facility];  Service: Urology    AL CYSTO W/REMOVAL OF TUMORS SMALL N/A 4/6/2020    Procedure: TRANSURETHRAL RESECTION OF BLADDER TUMOR (TURBT); Surgeon: Ab Bianchi MD;  [facility];  Service: Urology    ROTATOR CUFF REPAIR Left     TONSILLECTOMY           07/28/23 1425   OT Last Visit   OT Visit Date 07/28/23   Note Type   Note type Evaluation   Pain Assessment   Pain Assessment Tool 0-10   Pain Score No Pain   Restrictions/Precautions   Weight Bearing Precautions Per Order No   Other Precautions Chair Alarm; Bed Alarm; Fall Risk;Visual impairment   Home Living   Type of [address] [place] [address] Dr One level;Ramped entrance  (?)   Bathroom Shower/Tub Walk-in shower   Bathroom Toilet Standard   Bathroom Equipment Toilet raiser   Bathroom Accessibility Accessible   Home Equipment Walker;Cane;Wheelchair-manual  (difficult to determine which device pt primarily uses, appears to be RW)   Additional Comments Pt with difficulty verbalizing PLOF 2/2 expressive aphasia. Would be helpful to have confirmation from pt's son for PLOF, home set up, and assistance available   Prior Function   Level of [place] Independent with ADLs; Independent with functional mobility   Lives With Sushant Brown Help From Family   IADLs Family/Friend/Other provides transportation; Family/Friend/Other provides meals; Family/Friend/Other provides medication management  (son assists as needed per pt. Pt reports indep at times, and then states she needs assistance with all of the above)   Falls in the last 6 months 0  (pt denies)   Vocational Retired   Lifestyle   Reciprocal Relationships Pt reports have a strong relationship with her son   Intrinsic Gratification Pt unable to identify at this time   General   Family/Caregiver Present No   Subjective   Subjective "I feel like I dummy. I can't see, and what I'm thinking doesn't come out." -pt very frustrated with expressive aphasia   ADL   Where Assessed Other (Comment)  (Assist levels for some self care tasks are based on functional assessment of performance skills and deficits observed during session.)   Eating Assistance 5  Supervision/Setup   Eating Deficit Setup   Grooming Assistance 5  Supervision/Setup   Grooming Deficit Setup;Supervision/safety; Increased time to complete;Standing with assistive device   LB Dressing Assistance 5  Supervision/Setup   LB Dressing Deficit Setup;Supervision/safety; Increased time to complete; Don/doff R shoe;Don/doff L shoe  (leaning forward at EOB)   Toileting Assistance  5  Supervision/Setup   Toileting Deficit Setup;Supervison/safety; Increased time to complete   Bed Mobility   Supine to Sit 6  Modified independent   Additional items HOB elevated; Bedrails   Transfers   Sit to Stand 5  Supervision   Additional items Bedrails  (with RW)   Stand to Sit 5  Supervision   Additional items Armrests   Stand pivot 5  Supervision   Additional items   (with RW)   Toilet transfer 5  Supervision   Additional items Increased time required;Standard toilet  (grab bar)   Functional Mobility   Functional Mobility 5  Supervision   Additional Comments assist x1   Additional items Rolling walker   Activity Tolerance   Activity Tolerance Patient limited by fatigue   Medical Staff Made Aware Yesenia Gutierrez PT   RUE Assessment   RUE Assessment WFL  (grossly 4/5 throughout, equal to LUE. Pt reports feeling "funny" in RUE)   LUE Assessment   LUE Assessment   (Grossly 4/5 throughout)   Hand Function   Gross Motor Coordination   (mildly impaired RUE, dysmetria noted with FTN testing)   Fine Motor Coordination   (impaired R hand)   Sensation   Light Touch Partial deficits in the RUE  (pt unable to describe, but reports RUE "feels funny")   Vision-Basic Assessment   Current Vision Wears glasses only for reading; pt able to read clock on the wall, able to read white board, unable to read phone   Visual History   (pt reports hx of visual impairment (possibly was describing a R visual field cut?), unsure if this is new with onset of recent symptoms or from previous CVA)   Patient Visual Report   (Pt reports difficulty seeing objects on R side during environmental navigation (hitting right door jamb, difficulty managing RW on the R). Able to self correct with increased time. Unable to see cell phone)   Psychosocial   Psychosocial (WDL) X   Patient Behaviors/Mood Depressed; Tearful  (Pt became very frustrated with expressive aphasia. Became tearful when speaking with MD, stating she feels "like a dummy" and "I don't want to live this way." Pt did also expressed that she continues on for her son, and plans on future procedure.  Pt would likely benefit from pastoral or counseling services vs stroke support group for additional emotional support)   Cognition   Overall Cognitive Status WFL   Arousal/Participation Alert; Cooperative   Attention Within functional limits   Orientation Level Oriented to person;Oriented to place;Oriented to situation  (pt reports she does not keep track of the date, did not ask month 2/2 frustration with aphasia)   Memory Decreased recall of biographical information   Following Commands Follows multistep commands without difficulty  (pt followed 2 step commands readily)   Comments Cognition appears grossly WFL, pt able to indicate that her speech and her vision are her primary deficits at this point. Able to relate some of her medical hx. However much difficulty describing home set up. Will continue to assess. Assessment   Limitation Decreased endurance;Visual deficit; Decreased sensation;Decreased fine motor control;Decreased self-care trans;Decreased high-level ADLs   Assessment Pt is a 70 y.o. female seen for OT evaluation s/p admit to [facility] on 7/27/2023 w/ CVA (cerebral vascular accident) ([address] Comorbidities affecting pt's functional performance at time of assessment include: DM, HTN, obesity, limited vision, previous surgery and CKD, expressive aphasia, hx of CVA. Personal factors affecting pt at time of IE include:limited home support, difficulty performing ADLS, difficulty performing IADLS , decreased initiation and engagement , health management , environment and inability to call for assisance in an emergency. Pt with difficulty describing PLOF due to expressive aphasia, but reports she was using a RW vs a w/c for mobility, and is primarily sedentary. Able to perform ADLs with independence. Upon evaluation: Pt requires supervision during ADLs and functional mobility with RW 2* the following deficits impacting occupational performance: decreased balance, decreased tolerance, impaired sensation, impaired memory, impaired problem solving and impaired vision.  Pt to benefit from continued skilled OT tx while in the hospital to address deficits as defined above and maximize level of functional independence w ADL's and functional mobility. Occupational Performance areas to address include: grooming, bathing/shower, toilet hygiene, dressing, health maintenance, functional mobility and community mobility. From OT standpoint, recommendation at time of d/c would be inpatient rehab vs home with [facility] and nearly 24/7 supervision. Safety is a concern due to the pt's expressive aphasia/visual impairment and is unable to use phone at this time. Plan   Treatment Interventions ADL retraining;Visual perceptual retraining;Functional transfer training;UE strengthening/ROM; Endurance training;Patient/family training   Goal Expiration Date 08/11/23   OT Treatment Day 0   OT Frequency 3-5x/wk   Recommendation   OT Discharge Recommendation Post acute rehabilitation services  (vs home with [facility] and nearly 24/7 supervision)   Additional Comments  The patient's raw score on the AM-PAC Daily Activity Inpatient Short Form is 21. A raw score of greater than or equal to 19 suggests the patient may benefit from discharge to home. Please refer to the recommendation of the Occupational Therapist for safe discharge planning.    AM-PAC Daily Activity Inpatient   Lower Body Dressing 3   Bathing 3   Toileting 3   Upper Body Dressing 4   Grooming 4   Eating 4   Daily Activity Raw Score 21   Daily Activity Standardized Score (Calc for Raw Score >=11) 44.27   AM-PAC Applied Cognition Inpatient   Following a Speech/Presentation 3   Understanding Ordinary Conversation 3   Taking Medications 1   Remembering Where Things Are Placed or Put Away 2   Remembering List of 4-5 Errands 2   Taking Care of Complicated Tasks 2   Applied Cognition Raw Score 13   Applied Cognition Standardized Score 30.46       Goals: to be met by 8/11/23    Patient will perform functional transfers with modified independence using LRAD in preparation for ADL tasks, with good safety awareness  Patient will perform UB dressing task with modified independence while seated, with set up  Patient will perform LB dressing task with .modified independence  Patient will perform toilet transfer with modified independence  Patient will perform toileting with modified independence, including hygiene and clothing management   Patient will increase BUE strength by 1 MM grade in preparation to increase ability to participate in ADL tasks  Patient will tolerate HEP fine motor activity to increase gross and fine motor control in preparation for ADL tasks.   Patient will improve activity tolerance by participating in 30 minutes of session at a time in preparation for participation in ADL tasks  Patient will identify 3 potential fall hazards and identify compensatory techniques to decrease fall risk in the home environment  Patient will attend to 100% of cognitive task during session          Joe Duggan, OTR/L

## 2023-07-28 NOTE — ASSESSMENT & PLAN NOTE
· CTA head/neck showing acute to subacute infarct  · MRI-Large area of acute to subacute ischemia left PCA distribution occipital lobe.    · Likely from atherosclerotic disease/microvascular disease  · Neurology evaluated  · Incision from Plavix to Brilinta  · Continue fish oil  · Noted on pravastatin  · PT/OT consulted  · Awaiting recommendations  · Vascular surgery consulted for significant ICA disease  · Recommending [facility] in 2 to 4 weeks outpatient setting up appointments for this now  · Also recommending cardiac work-up for clearance given significant history  · Cardiology consulted  · Awaiting recommendations for clearance for outpatient surgery

## 2023-07-28 NOTE — PHYSICAL THERAPY NOTE
Physical Therapy Evaluation     07/28/23 0759   PT Last Visit   PT Visit Date 07/28/23   Note Type   Note type Evaluation  (pt received seated EOB with no complaints of pain)   Pain Assessment   Pain Assessment Tool 0-10   Pain Score No Pain   Restrictions/Precautions   Weight Bearing Precautions Per Order No   Other Precautions Chair Alarm; Bed Alarm; Fall Risk;Cognitive;Telemetry   Home Living   Type of [facility] One level;Ramped entrance  (ramp to get onto deck to enter home)   Bathroom Shower/Tub Walk-in shower   [facility] Other (Comment)  (none reported at baseline)   [address] Walker;Cane;Wheelchair-manual  (pt reports owning all 3 devices; unable to determine at this time what pt uses at baseline)   Additional Comments Pt unable to verbalize home set up and PLOF at this time secondary to aphasia. Information obtained from PT eval 1/24/2022. Please follow up with CM for updated home set up and PLOF   Prior Function   Level of [place] Independent with ADLs; Independent with functional mobility   Lives With [facility] Help From Family   IADLs Family/Friend/Other provides transportation; Family/Friend/Other provides medication management; Family/Friend/Other provides meals  (son helps per pt at time of eval)   Falls in the last 6 months 0   Vocational Retired   General   Family/Caregiver Present No   Cognition   Overall Cognitive Status Impaired   Arousal/Participation Alert   Orientation Level Oriented to person;Oriented to situation;Disoriented to time;Oriented to place  ("i don't know the date, I do not care what it is so I dont keep track")   Memory Decreased recall of recent events;Decreased short term memory   Following Commands Follows one step commands with increased time or repetition   Comments pt agreeable to PT eval   Subjective   Subjective "The right side is still feeling a little goofy"   RLE Assessment RLE Assessment X   Strength RLE   R Hip Flexion 4-/5   R Knee Extension 4/5   R Ankle Dorsiflexion 5/5   LLE Assessment   LLE Assessment X   Strength LLE   L Hip Flexion 4-/5   L Knee Extension 4/5   L Ankle Dorsiflexion 4+/5   Vision-Basic Assessment   Current Vision   (pt had glasses in the room but did not wear them at time of eval)   Coordination   Sensation X  (continues to report R side is still numb, but better than when sx came on)   Bed Mobility   Additional Comments pt received seated EOB   Transfers   Sit to Stand 4  Minimal assistance   Additional items Assist x 1;Verbal cues   Stand to Sit 4  Minimal assistance   Additional items Assist x 1;Verbal cues   Additional Comments pt denied dizziness/lightheadedness upon standing   Ambulation/Elevation   Gait pattern Improper Weight shift;Decreased foot clearance; Short stride; Excessively slow;Decreased hip extension;Decreased heel strike;Decreased toe off;Shuffling   Gait Assistance 4  Minimal assist   Additional items Assist x 1;Verbal cues   Assistive Device Rolling walker   Distance 80 feet   Stair Management Assistance Not tested   Balance   Static Sitting Fair   Dynamic Sitting Fair -   Static Standing Fair -   Dynamic Standing Poor +   Ambulatory Poor +   Endurance Deficit   Endurance Deficit Yes   Endurance Deficit Description decreased activity tolerance   Activity Tolerance   Activity Tolerance Patient limited by fatigue   Medical Staff Made Aware PT [place]   Assessment   Prognosis Good   Problem List Decreased strength; Impaired balance;Decreased endurance;Decreased mobility; Impaired sensation;Decreased cognition   Assessment Pt is a 70year old presenting to [facility] with CVA. PT orders were received for eval and treat. Current co-morbidities consist of: diabetes, HTN, spinal stenosis, basilar artery stenosis, stage 3 CKD, gout, and recurrent depression.  Personal factors affecting subsequent therapy include: advanced age, chronic pre-existing disease, decreased caregiver support, and h/o depression. PTA, pt was I with ADLs and functional mobility. Pt with increased frustation with aphasia and was unable to verbalize comprehensive home set up and PLOF. Available information obtained from previous PT note on 1/24/2023. Please follow up with CM for current information. Impairments at time of eval consist of decreased strength, decreased endurance, impaired balance, decreased mobility, decreased cognition, and impaired sensation. Impairments listed require Min Ax1 for transfers, and MinAx1 for ambulation of 80 ft with RW and formal MMT showing symmetrical functional strength in LEs. Values can be referenced in the flowsheet. Objective measures taken at this time include AM-PAC score =17/24, JH-HLM=7/8, Modified Maggy=4, and Barthel Index=50/100. Upon arrival pt was pleasant and seated EOB. Pt would benefit from continued skilled PT to address the above impairments to maximize functional independence and to facilitate return to PLOF. From a PT/mobility standpoint, d/c recommendation at this time would be to post acute rehab services. Barriers to Discharge Decreased caregiver support   Goals   Patient Goals to feel better   [facility] Expiration Date 08/07/23   Short Term Goal #1 In 7-10 days, pt will improve strength by 1/2 to facilitate functional strength, demonstrate I for bed mobility, I for transfers, Mod I for ambulation with the least restrictive device for 250' to promote functional mobility consistent with PLOF and decrease caregiver burden, and improve balance score by 1/2 to reduce risk of falls. Stair negotiation goal to be made pending further mobility assessment. PT Treatment Day 0   Plan   Treatment/Interventions LE strengthening/ROM; Functional transfer training; Endurance training;Cognitive reorientation;Equipment eval/education; Bed mobility;Gait training;Spoke to nursing; Therapeutic exercise;Patient/family training   PT Frequency 3-5x/wk   Recommendation   PT Discharge Recommendation Post acute rehabilitation services   Equipment Recommended Walker  (continue RW use)   AM-PAC Basic Mobility Inpatient   Turning in Flat Bed Without Bedrails 3   Lying on Back to Sitting on Edge of Flat Bed Without Bedrails 3   Moving Bed to Chair 3   Standing Up From Chair Using Arms 3   Walk in Room 3   Climb 3-5 Stairs With Railing 2   Basic Mobility Inpatient Raw Score 17   Basic Mobility Standardized Score 39.67   Highest Level Of Mobility   -HLM Goal 5: Stand one or more mins   -HLM Achieved 7: Walk 25 feet or more   Modified Maggy Scale   Modified Jasper Scale 4   Barthel Index   Feeding 10   Bathing 0   Grooming Score 0   Dressing Score 5   Bladder Score 10   Bowels Score 10   Toilet Use Score 5   Transfers (Bed/Chair) Score 10   Mobility (Level Surface) Score 0   Stairs Score 0   Barthel Index Score 50   End of Consult   Patient Position at End of Consult Bedside chair;Bed/Chair alarm activated; All needs within reach   Signed by Sonja Blackwell, SPT

## 2023-07-28 NOTE — CASE MANAGEMENT
CASE MANAGEMENT ASSESSMENT AND DISCHARGE Plan  Advance Directives  Does patient have a Health Care POA? Yes   Does patient have Advance Directives? Yes   Advance Directives Living will   Primary Contact inder mcfarland (Son) [phone] (Mobile) inder mcfarland (Son)   ASSESSMENT   27 Day Readmission No    Patient Information  Admitted from: Home   Mental Status Alert    During Assessment patient was accompanied by Not accompanied during assessment Assessment information provided by: Patient   Primary [address] of Residence [address] [place] [address] do you live in? [place], [place]   Home entry access options. Select all that apply. Ramp    Type of Current Residence [place]  In the last 12 months, was there a time when you were not able to pay the mortgage or rent on time? No  In the last 12 months, how many places have you lived? 1   In the last 12 months, was there a time when you did not have a steady place to sleep or slept in a shelter (including now)? No Homeless/housing insecurity resource given? N/A   Living Arrangements Lives w/ Son Lives w/ Son  Is patient a ? No   Activities of Daily Living Prior to Admission  Functional Status Independent    Completes ADLs independently? Yes   Ambulates independently? Yes    Does patient use assisted devices? Yes   Assisted Devices (DME) used [employer]/danish   Does patient currently own DME? Yes    What DME does the patient currently own? Kerrie Conrad; Bedside Commode; Shower Chair   Does patient have a history of Outpatient Therapy (PT/OT)? Yes   Does the patient have a history of Short-Term Rehab? No   Does patient have a history of HHC? Yes    Does patient currently have [address]? No    Patient Information Continued  Income Source Other (Comment)  The comment is SSI plus pension. Does patient have prescription coverage?  Yes Within the past 12 months, you worried that your food would run out before you got the money to buy more. Never true   Within the past 12 months, the food you bought just didn't last and you didn't have money to get more. Never true Never true  Food insecurity resource given? N/A    Does patient receive dialysis treatments? No   Does patient have a history of substance abuse? No  Current Status:  Does patient have a history of Mental Health Diagnosis? No   The comment is pt says she is feeling depressed over current s/s, but has not been dx'd in the past and does not take meds or have counseling. Taken on 7/28/23 0062   PHQ 2/9 Screening   Reviewed PHQ 2/9 Depression Screening Score? -- --   Means of Transportation  Means of Transport to Appts: Family transport Family transport   In the past 12 months, has lack of transportation kept you from medical appointments or from getting medications? No In the past 12 months, has lack of transportation kept you from meetings, work, or from getting things needed for daily living? No   Was application for public transport provided? N/A   Discharge planning  Discharge planning discussed with:pt  Freedom of ChoiceYes  Comments - Freedom of Choice  Met w pt, introduced self and explained role of CM, including arranging DME, STR, home health, out pt tx. Advised pt that CM will make appropriate referrals based on treatment team recommendations and MD orders, and she can consider recommended plan of care and choose from available vendors. Discussed tx team recommendation for STR; pt declined to consider. Reports she would be agreeable to home health. [place] referrals made; awaiting responses. CM contacted family/caregiver? No- see comments  Comments:  alert and oriented adult  Were Treatment Team discharge recommendations reviewed with patient/caregiver? Yes  Did patient/caregiver verbalize understanding of patient care needs? Yes  Were patient/caregiver advised of the risks associated with not following Treatment Team discharge recommendations?  Yes  Patient Contacts inder mcfarland (Son) [phone] (Mobile)  Relationship to Patient:  [address]      Is the patient interested in [address] at discharge? Yes  Home Health Discipline requested:  Nursing; Occupational Therapy; Physical Therapy; Speech Language PathologySelect the discipline needed. Home Health Agency Name: (No Value)TBD  Home Health Follow-Up Provider: PCP  Home Health Services Needed:  Evaluate Functional Status and Safety; Gait/ADL Training; Strengthening/Theraputic Exercises to Improve Function; Other (comment)speech therapy  Homebound Criteria Met:  Uses an Assist Device (i.e. cane, walker, etc); Requires the Assistance of Another Person for Safe Ambulation or to Leave the Home  Supporting Clincal Findings:  Fatigues [place] in [employer]; Limited Endurance  Referral made for DME? No  Referral Comments  Tx team recommending STR placement for pt; AM-PAC at 21. Pt declines STR; agreeable to [facility]. [place] referrals made; awaiting responses.   Discharge Destination & Transportation Plan     Treatment Team Recommendation  Short Term Rehab  Discharge Destination Plan:  Home with [address] at Discharge   Family

## 2023-07-28 NOTE — PROGRESS NOTES
[address]  Progress Note  Name: Neris Emmanuel  MRN: [id number]  Unit/Bed#: -01 I Date of Admission: 7/27/2023   Date of Service: 7/28/2023 I Hospital Day: 1    Assessment/Plan   * CVA (cerebral vascular accident) [facility])  Assessment & Plan  · CTA head/neck showing acute to subacute infarct  · MRI-Large area of acute to subacute ischemia left PCA distribution occipital lobe. · Likely from atherosclerotic disease/microvascular disease  · Neurology evaluated  · Incision from Plavix to Brilinta  · Continue fish oil  · Noted on pravastatin  · PT/OT consulted  · Awaiting recommendations  · Vascular surgery consulted for significant ICA disease  · Recommending TCAR in 2 to 4 weeks outpatient setting up appointments for this now  · Also recommending cardiac work-up for clearance given significant history  · Cardiology consulted  · Awaiting recommendations for clearance for outpatient surgery     Hypertensive urgency  Assessment & Plan  · Present on admission  · Resumed home meds  · Still requiring multiple doses of IV PRNs overnight 7/27/2023  · Monitor blood pressure     Diabetes ([address])  Assessment & Plan  Lab Results   Component Value Date    HGBA1C 9.1 (H) 07/27/2023       Recent Labs     07/27/23  1734 07/27/23  2054 07/28/23  0701   POCGLU 215* 228* 173*       Blood Sugar Average: Last 72 hrs:  · (P) [id number] controlled diabetes  · Start sliding scale algorithm 4  · Monitor glucose levels  · Titrate as tolerated     Symptomatic carotid artery stenosis, left  Assessment & Plan  Noted on CTA head and neck  Recommending TCAR in 2 to 4 weeks  Awaiting cardiac clearance           VTE Pharmacologic Prophylaxis:   Moderate Risk (Score 3-4) - Pharmacological DVT Prophylaxis Ordered: heparin. Patient Centered Rounds: I performed bedside rounds with nursing staff today.   Discussions with Specialists or Other Care Team Provider: Reviewed previous providers notes discussed with vascular discussed with neurology    Education and Discussions with Family / Patient: Discussed with patient and family    Total Time Spent on Date of Encounter in care of patient: 35 minutes This time was spent on one or more of the following: performing physical exam; counseling and coordination of care; obtaining or reviewing history; documenting in the medical record; reviewing/ordering tests, medications or procedures; communicating with other healthcare professionals and discussing with patient's family/caregivers. Current Length of Stay: 1 day(s)  Current Patient Status: Inpatient   Certification Statement: The patient will continue to require additional inpatient hospital stay due to Already had clearance for outpatient vascular surgery  Discharge Plan: Anticipate discharge tomorrow to home. Code Status: Level 1 - Full Code    Subjective:   Patient feels well denies any numbness or weakness is ambulating in her room denies any speech difficulties is eating without difficulty understands that she will need outpatient vascular procedure and is agreeable denies any chest pain chest tightness shortness of breath or difficulty breathing    Objective:     Vitals:   Temp (24hrs), Av.9 °F (36.6 °C), Min:97.6 °F (36.4 °C), Max:98.1 °F (36.7 °C)    Temp:  [97.6 °F (36.4 °C)-98.1 °F (36.7 °C)] 97.6 °F (36.4 °C)  HR:  [63-84] 68  Resp:  [18] 18  BP: (143-207)/(58-95) 157/58  SpO2:  [92 %-99 %] 97 %  There is no height or weight on file to calculate BMI. Input and Output Summary (last 24 hours):   No intake or output data in the 24 hours ending 23 1349    Physical Exam:   Physical Exam  Vitals reviewed. Constitutional:       General: She is not in acute distress. Appearance: She is obese. She is not ill-appearing. Cardiovascular:      Rate and Rhythm: Normal rate. Pulmonary:      Effort: No respiratory distress. Abdominal:      Palpations: Abdomen is soft.    Skin:     General: Skin is warm.      Coloration: Skin is pale. Neurological:      Mental Status: She is alert. Mental status is at baseline.    Psychiatric:         Mood and Affect: Mood normal.        Additional Data:     Labs:  Results from last 7 days   Lab Units 07/28/23  0455 07/27/23  1217   WBC Thousand/uL 9.46 8.52   HEMOGLOBIN g/dL 15.2 14.9   HEMATOCRIT % 43.2 42.4   PLATELETS Thousands/uL 251 249   NEUTROS PCT %  --  68   LYMPHS PCT %  --  20   MONOS PCT %  --  7   EOS PCT %  --  3     Results from last 7 days   Lab Units 07/28/23  0455   SODIUM mmol/L 138   POTASSIUM mmol/L 3.6   CHLORIDE mmol/L 105   CO2 mmol/L 24   BUN mg/dL 18   CREATININE mg/dL 1.41*   ANION GAP mmol/L 9   CALCIUM mg/dL 10.6*   GLUCOSE RANDOM mg/dL 174*         Results from last 7 days   Lab Units 07/28/23  1121 07/28/23  0701 07/27/23  2054 07/27/23  1734   POC GLUCOSE mg/dl 263* 173* 228* 215*     Results from last 7 days   Lab Units 07/27/23  1217   HEMOGLOBIN A1C % 9.2*  9.1*           Lines/Drains:  Invasive Devices     Peripheral Intravenous Line  Duration           Peripheral IV 07/27/23 Right Antecubital 1 day                  Recent Cultures (last 7 days):         Last 24 Hours Medication List:   Current Facility-Administered Medications   Medication Dose Route Frequency Provider Last Rate   • amLODIPine  10 mg Oral Daily Monet Macdonald MD     • [START ON 7/29/2023] aspirin  81 mg Oral Daily Maria Antonia Bautista PA-C     • cloNIDine  1 patch Transdermal Weekly Monet Macdonald MD     • fish oil  1,000 mg Oral Daily Monet Macdonald MD     • heparin (porcine)  5,000 Units Subcutaneous [facility] Monet Macdonald MD     • insulin lispro  2-12 Units Subcutaneous TID With Meals Monet Macdonald MD     • insulin lispro  2-12 Units Subcutaneous HS Monet Macdonald MD     • labetalol  10 mg Intravenous Q6H PRN Monet Macdonald MD     • labetalol  300 mg Oral BID Monet Macdonald MD     • losartan  100 mg Oral Daily Monet Macdonald MD     • pravastatin  80 mg Oral Daily With [employer], [place]     • ticagrelor  90 mg Oral Q12H [address] [facility] [place], [place]          Today, Patient Was Seen By: GRACIELA Gipson    **Please Note: This note may have been constructed using a voice recognition system. **

## 2023-07-28 NOTE — TELEPHONE ENCOUNTER
Beny Arguelles dropped off [place] papers to be filled out for him to help to take care of his mother. Did discuss this with Dr. Louann Coppola at last visit. Call Beny Arguelles when complete and he will  at [phone].     Placed in MA's bin/In big yellow envelope

## 2023-07-28 NOTE — SPEECH THERAPY NOTE
Speech-Language Pathology Bedside Swallow Evaluation        Patient Name: Prem Carter    CXCSX'M Date: 7/28/2023     Problem List  Principal Problem:    CVA (cerebral vascular accident) [facility])  Active Problems:    Diabetes ([address])    Hypertensive urgency         Past Medical History  Past Medical History:   Diagnosis Date   • Arthritis    • Chronic kidney disease    • Endometriosis    • High cholesterol    • Hypertension    • Kidney disease, chronic, stage III (moderate, EGFR 30-59 ml/min) ([facility])    • Lumbar disc herniation    • Neuropathy    • Peripheral vascular disease ([facility])    • Pneumonia    • Shoulder injury     left   • Spinal stenosis    • Stroke ([address]) 2015    Memory loss       Past Surgical History  Past Surgical History:   Procedure Laterality Date   • CARDIAC CATHETERIZATION     • COLONOSCOPY     • FL RETROGRADE PYELOGRAM  4/6/2020   • FRACTURE SURGERY Right     ankle   • OVARIAN CYST SURGERY     • UT CYSTO BLADDER W/URETERAL CATHETERIZATION Bilateral 4/6/2020    Procedure: CYSTOSCOPY WITH RETROGRADE PYELOGRAM;  Surgeon: Cameron Holder MD;  [facility];  Service: Urology   • UT CYSTO W/INSERT URETERAL STENT Right 4/6/2020    Procedure: INSERTION STENT URETERAL;  Surgeon: Cameron Holder MD;  [facility];  Service: Urology   • UT CYSTO W/REMOVAL OF TUMORS SMALL N/A 4/6/2020    Procedure: TRANSURETHRAL RESECTION OF BLADDER TUMOR (TURBT); Surgeon: Cameron Holder MD;  [facility];  Service: Urology   • ROTATOR CUFF REPAIR Left    • TONSILLECTOMY         Summary/Impressions:   Bedside observations support grossly intact oropharyngeal swallow function across all consistencies tested. Self-fed solid and liquid trials with no s/s of dysphagia or distress. Patient denies difficulties or changes from baseline function. Informal evaluation suggestive of expressive aphasia; pt exhibits a fair amount of frustration as a result of this problem. Onset unclear.   May benefit from home health services following discharge. Will plan to follow up for further evaluation as patient status and scheduling permits while in the acute care setting. Recommendations:   Diet: regular diet and thin liquids   Meds: whole with liquid   Feeding assistance: independent   Frequent Oral care: 2-4x/day  Aspiration precautions and compensatory swallowing strategies: upright posture  Other Recommendations/ considerations: No further ST follow up re: swallow function; pt may benefit from language evaluation as signs of aphasia noted in conversational speech      Current Medical Status  Pt is a 70 y.o. female who presented to [facility] with constellation of symptoms including dysarthria, weakness, reduced short term memory that's been progressing since symptoms were noticed on Saturday. Her symptoms were not improving so she came to the ED for further evaluation. In the ED, she was found to have an acute to subacute infarct and admitted to [facility] for further management. Past medical history:  Please see H&P for details    Special Studies:  MRI brain 7/27/23:  1. Large area of acute to subacute ischemia left PCA distribution occipital lobe. 2. Small foci of acute to subacute ischemia in the left frontal and left parietal/temporal lobes MCA distribution.     Findings were marked as "immediate"in Epic and will now be related to the ordering physician or covering clinical team by the radiology liaison.     Social/Education/Vocational Hx:  Pt lives with family    Swallow Information   Current Risks for Dysphagia & Aspiration: CVA  Current Symptoms/Concerns: CVA  Current Diet: regular diet and thin liquids   Baseline Diet: regular diet and thin liquids    Baseline Assessment   Behavior/Cognition: alert  Speech/Language Status: able to participate in basic conversation  Patient Positioning: upright in bed    Swallow Mechanism Exam   Facial: symmetrical  Labial: decreased ROM right side  Lingual: WFL  Velum: symmetrical  Mandible: adequate ROM  Dentition: adequate  Vocal quality:clear/adequate   Volitional Cough: strong/productive   Respiratory: RA    Consistencies Assessed and Performance   Consistencies Administered: thin liquids, puree and hard solids    Oral Stage: WFL. Patient presents with adequate bolus acceptance, containment and manipulation. Mastication judged to be efficient and complete. No oral residue. Pharyngeal Stage: Appears functional.  Laryngeal rise noted upon palpation. Swallow initiation appears timely. No overt s/s of aspiration or distress. Vocal quality remains clear and dry. Esophageal Concerns: none reported     Plan  No additional follow-up at this time re swallow function. Please re-order should pt exhibit change in status or concerns arise. Will plan for follow up re: expressive aphasia ? receptive     Results Reviewed with: RN and MD       Speech Therapy Prognosis   Prognosis: fair  Prognosis Considerations: therapeutic potential      Vu Record, [address] [facility], [facility] [address] [facility]  Speech-Language Pathologist  [place] #[id number]  [place] #[id number]

## 2023-07-28 NOTE — ASSESSMENT & PLAN NOTE
60-year-old female, right-hand-dominant with HTN, HLD, DM, CKD, history of lacunar CVA, former smoker, multivessel CAD, AIOD/PAD w/ short distance claudication, renal artery stenosis, mesenteric stenosis, bladder mass s/p TURBT '20, bilateral carotid stenosis presents with right hand weakness, speech difficulties. Stroke pathway. CTA of the neck showed significant bilateral ICA stenosis, intracranial disease, vertebral artery stenosis. Vascular consulted for concern for intracranial extracranial symptomatic rotted stenosis    Diagnostics:  -CTA head and neck 7/27/23 : Hypodensity and loss of gray-white matter differentiation involving left occipital and inferior parietal lobe concerning for evolving acute or subacute infarct. Chronic microangiopathic change. High-grade stenosis of the left PCA at proximal P2 segment. Occluded versus hypoplastic left P1 segment with patent P-comm perfusing left PCA. Severe left and moderate right bilateral supraclinoid ICA stenosis. Severe stenosis of the left vertebral artery origin. Patent narrow caliber left vertebral artery in the cervical and pre-PICA intracranial segments with multifocal severe stenosis. Post PICA segment is occluded. Multifocal high-grade stenosis of the right intracranial vertebral artery. Advanced atherosclerotic change of bilateral cervical carotid arteries. Estimated 85-90% left and 80% right proximal ICA stenosis. Atherosclerotic disease of the aortic arch with linear defect at the origin of left subclavian artery. Differentials include ulcerated plaque versus focal dissection flap (likely chronic). -MRI of the brain 7/27/23 : Large area of acute to subacute ischemia left PCA distribution occipital lobe.  Small foci of acute to subacute ischemia in the left frontal and left parietal/temporal lobes MCA distribution   -CV duplex 7/28/23 : Bilateral ICA greater than 70% stenosis, right ICA velocities 301/88, ratio 4.9 and left ICA velocities 571/152, ratio 8.68  -Echo pending to be done 7/28/23    Recommendations   -Severe bilateral ICA stenosis with large acute to subacute left PCA and small acute to subacute left frontal and parietal/temporal infarct  -Continues with mild word finding difficulties, improving. Has significant improvement in right upper extremity weakness  -Patient would benefit from left carotid revascularization, L TCAR  -Echo pending. Will consult cardiology for preoperative risk stratification. History of multivessel CAD  -Would appreciate neurology input for timing of carotid revascularization. Considering 4-6 weeks for surgery to allow for recovery from current stroke  -Was on Plavix prior to admission. Switched to Danial Schein. Also on aspirin 81 mg  -Intolerant to statin therapy, on fish oil.   Started on Pravachol  -Will arrange for outpatient follow up with vascular surgeon in 2 weeks of discharge to discuss TCAR   -Discussed with SLIM   -Discussed with Dr Carol Moura

## 2023-07-29 VITALS
SYSTOLIC BLOOD PRESSURE: 127 MMHG | OXYGEN SATURATION: 97 % | DIASTOLIC BLOOD PRESSURE: 83 MMHG | HEART RATE: 71 BPM | RESPIRATION RATE: 19 BRPM | TEMPERATURE: 97.8 F

## 2023-07-29 LAB
GLUCOSE SERPL-MCNC: 208 MG/DL (ref 65–140)
GLUCOSE SERPL-MCNC: 226 MG/DL (ref 65–140)

## 2023-07-29 PROCEDURE — 82948 REAGENT STRIP/BLOOD GLUCOSE: CPT

## 2023-07-29 PROCEDURE — 99232 SBSQ HOSP IP/OBS MODERATE 35: CPT | Performed by: PSYCHIATRY & NEUROLOGY

## 2023-07-29 PROCEDURE — 99239 HOSP IP/OBS DSCHRG MGMT >30: CPT | Performed by: NURSE PRACTITIONER

## 2023-07-29 RX ORDER — LANCETS 33 GAUGE
EACH MISCELLANEOUS
Qty: 200 EACH | Refills: 0 | Status: SHIPPED | OUTPATIENT
Start: 2023-07-29

## 2023-07-29 RX ORDER — CHLORAL HYDRATE 500 MG
1000 CAPSULE ORAL DAILY
Qty: 30 CAPSULE | Refills: 0 | Status: SHIPPED | OUTPATIENT
Start: 2023-07-30 | End: 2023-08-29

## 2023-07-29 RX ORDER — ASPIRIN 81 MG/1
81 TABLET, CHEWABLE ORAL DAILY
Qty: 30 TABLET | Refills: 0 | Status: ON HOLD | OUTPATIENT
Start: 2023-07-30 | End: 2023-08-19 | Stop reason: SDUPTHER

## 2023-07-29 RX ORDER — INSULIN GLARGINE 100 [IU]/ML
5 INJECTION, SOLUTION SUBCUTANEOUS
Status: DISCONTINUED | OUTPATIENT
Start: 2023-07-29 | End: 2023-07-29 | Stop reason: HOSPADM

## 2023-07-29 RX ORDER — GLUCOSAMINE HCL/CHONDROITIN SU 500-400 MG
CAPSULE ORAL
Qty: 200 EACH | Refills: 0 | Status: SHIPPED | OUTPATIENT
Start: 2023-07-29

## 2023-07-29 RX ORDER — PRAVASTATIN SODIUM 80 MG/1
80 TABLET ORAL
Qty: 14 TABLET | Refills: 0 | Status: SHIPPED | OUTPATIENT
Start: 2023-07-29 | End: 2023-08-01 | Stop reason: SDUPTHER

## 2023-07-29 RX ORDER — BLOOD-GLUCOSE METER
KIT MISCELLANEOUS
Qty: 1 KIT | Refills: 0 | Status: SHIPPED | OUTPATIENT
Start: 2023-07-29

## 2023-07-29 RX ORDER — ACETAMINOPHEN 325 MG/1
650 TABLET ORAL EVERY 6 HOURS PRN
Status: DISCONTINUED | OUTPATIENT
Start: 2023-07-29 | End: 2023-07-29 | Stop reason: HOSPADM

## 2023-07-29 RX ORDER — BLOOD SUGAR DIAGNOSTIC
STRIP MISCELLANEOUS
Qty: 200 EACH | Refills: 0 | Status: SHIPPED | OUTPATIENT
Start: 2023-07-29

## 2023-07-29 RX ADMIN — LABETALOL HYDROCHLORIDE 300 MG: 100 TABLET, FILM COATED ORAL at 09:37

## 2023-07-29 RX ADMIN — ACETAMINOPHEN 650 MG: 325 TABLET, FILM COATED ORAL at 03:17

## 2023-07-29 RX ADMIN — INSULIN LISPRO 4 UNITS: 100 INJECTION, SOLUTION INTRAVENOUS; SUBCUTANEOUS at 08:00

## 2023-07-29 RX ADMIN — HEPARIN SODIUM 5000 UNITS: 5000 INJECTION INTRAVENOUS; SUBCUTANEOUS at 05:07

## 2023-07-29 RX ADMIN — AMLODIPINE BESYLATE 10 MG: 10 TABLET ORAL at 09:36

## 2023-07-29 RX ADMIN — OMEGA-3 FATTY ACIDS CAP 1000 MG 1000 MG: 1000 CAP at 09:36

## 2023-07-29 RX ADMIN — LOSARTAN POTASSIUM 100 MG: 50 TABLET, FILM COATED ORAL at 09:37

## 2023-07-29 RX ADMIN — ASPIRIN 81 MG: 81 TABLET, CHEWABLE ORAL at 09:36

## 2023-07-29 RX ADMIN — TICAGRELOR 90 MG: 90 TABLET ORAL at 09:36

## 2023-07-29 RX ADMIN — INSULIN LISPRO 4 UNITS: 100 INJECTION, SOLUTION INTRAVENOUS; SUBCUTANEOUS at 12:42

## 2023-07-29 NOTE — ASSESSMENT & PLAN NOTE
· Patient presented to the ED with dysarthria, weakness, reduced short memory which had been progressing x 5 days  · CTA Head/Neck (7/27/23): Acute to subacute infarct  · Neurology consult, appreciate input  · MRI Brain (7/27/23): 1. Large area of acute to subacute ischemia left PCA distribution occipital lobe. 2. Small foci of acute to subacute ischemia in the left frontal and left parietal/temporal lobes MCA distribution. · She is at high risk for recurrence of stroke  · Cotinue Aspirin, Brilinta [newly started]  · Intolerance to statins in the past; however, PCSK9 inhibitors non-formulary; agreeable to starting Pravastatin. · PT/OT recommending STR; patient refusing, wants to go home. · Patient with worsening carotid stenosis; has worsened from 50% to >80% in approx 1 year. · Vascular surgery consult, appreciate input  · Will need to start medication such as Repatha or Praluent  · Will eventually need to wait approx.  4 wks before carotid intervention  · Recommending Cardiac clearance  · Noted to be at a moderate risk; planning for an outpatient stress test/echocardiogram within the next week

## 2023-07-29 NOTE — DISCHARGE INSTR - AVS FIRST PAGE
Praluent has been ordered to lower high cholesterol levels.   It has been ordered, but will need prior auth and may not be available to you right away  Continue Pravastatin until medication is secured  Follow up with PCP within 1 week  Follow up with Cardiology for echocardiogram/stress test - schedule these asap  Vascular follow-up

## 2023-07-29 NOTE — ASSESSMENT & PLAN NOTE
Lab Results   Component Value Date    HGBA1C 9.1 (H) 07/27/2023    HGBA1C 9.2 (H) 07/27/2023       Recent Labs     07/28/23  1121 07/28/23  1659 07/28/23 2059 07/29/23  0837   POCGLU 263* 146* 234* 208*       • Chronic, uncontrolled, as evidenced by a hemoglobin A1C of 9.1  • Continue patient on insulin sliding scale; will add low dose lantus at bedtime; titrate as needed  • Diabetes supplies sent home with patient; instructed to check blood glucose levels AC/HS and keep a log  • Follow up with PCP regarding oral medication vs insulin  • [facility] diet

## 2023-07-29 NOTE — UTILIZATION REVIEW
7/29/23  Day 3: Has surpassed a 2nd midnight with active treatments and services, which included Cardiology consult for pre-op risk assessment. See initial review completed by Nba Andarde RN on 7/28/23.

## 2023-07-29 NOTE — PROGRESS NOTES
Progress Note - Neurology   [facility] 70 y.[facility]. female MRN: [id number]  Unit/Bed#: -01 Encounter: [id number]      Assessment/Plan     * CVA (cerebral vascular accident) [facility])  Assessment & Plan  70 y.o. right handed female with HLD, HTN, diabetes, GIST, obesity, aortoiliac stenosis, PVD, basilar artery stenosis, prior stroke who presents with speech difficulty, RUE decreased sensation and R hand clumsiness since 7/22/23. BP on arrival 195/80. CTA head and neck without contrast in ED with severe multivessel stenosis, occlusion versus hypoplastic left P1 segment, and hypodensity involving left occipital and left parietal. MRI brain wo contrast confirmed stroke. Pt on Plavix PTA. Suspect L MCA/PCA infarcts due to symptomatic L ICA disease. Imaging  - MRI brain wo contrast 7/27/23:  "1. Large area of acute to subacute ischemia left PCA distribution occipital lobe. 2. Small foci of acute to subacute ischemia in the left frontal and left parietal/temporal lobes MCA distribution. "  - CTA H/N wwo contrast 7/27/23:  "1. Hypodensity and loss of gray-white matter differentiation involving left occipital and inferior parietal lobe concerning for evolving acute or subacute infarct. 2.  Chronic microangiopathic change. 3.  High-grade stenosis of the left PCA at proximal P2 segment. Occluded versus hypoplastic left P1 segment with patent P-comm perfusing left PCA. 4.  Severe left and moderate right bilateral supraclinoid ICA stenosis. 5.  Severe stenosis of the left vertebral artery origin. Patent narrow caliber left vertebral artery in the cervical and pre-PICA intracranial segments with multifocal severe stenosis. Post PICA segment is occluded. 6.  Multifocal high-grade stenosis of the right intracranial vertebral artery. 7.  Advanced atherosclerotic change of bilateral cervical carotid arteries. Estimated 85-90% left and 80% right proximal ICA stenosis.  8.  Atherosclerotic disease of the aortic arch with linear defect at the origin of left subclavian artery. Differentials include ulcerated plaque versus focal dissection flap (likely chronic). "    Plan  - Stroke pathway  • MRI brain completed, see above   • Echo as outpatient as per Cards  • Carotid ultrasound with >70% stenosis bilaterally; progressed from last CUS  • Vascular surgery consult - plan for TCAR in 2-4 weeks; discussed personally with Dr. Marci Katz  • Labs  o Lipid Panel: Total cholesterol 368, triglycerides 329,   o Hemoglobin A1c 9.1  • D/c'd home Plavix 7/28/23  • Given  mg load x 1 and Brilinta 180 mg load x 1 on 7/28/23  • Start Brilinta 90 mg BID at 2100 on 7/28/23  • Start ASA 81 mg daily on 7/29/23  • Statins listed as allergy for patient (side effect of myalgia). Counseled pt at length with improtance of statin or initiation of PCSK9 inhibitor. Pt willing to try pravastatin 80 mg daily on 7/28/23 however agree with Vascular Surgery that a PCSK9 inhibitor would be ideal  o Per primary team, Kailyn Fagan is covered but requires prior auth - ok to continue current statin until she is able to obtain this as an outpatient  • Goal normotension as symptoms greater than 24 hours from onset  • Euglycemic, normothermic goal  • Continue telemetry  • PT/OT/ST  • Stroke education  • Frequent neuro checks. Continue to monitor and notify neurology with any changes. • STAT CT head for any acute change in neuro exam  • No further inpatient recommendations at this time; call with questions - will follow in stroke clinic as outpatient  - Medical management and supportive care per primary team. Correction of any metabolic or infectious disturbances. [place] will need follow up in in 6 weeks with neurovascular attending. She will not require outpatient neurological testing. Subjective:   No new complaints. Is doing well. Feels that her speech is much better today.  Vascular Surgery and Cardiology evaluated yesterday - planning for carotid revascularization within 2-4 weeks and Cardiology clearance as outpatient this next week. ROS:  Visual changes, speech changes, otherwise negative per review of all systems. Vitals: Blood pressure 124/80, pulse 71, temperature 97.5 °F (36.4 °C), temperature source Oral, resp. rate 19, SpO2 97 %. ,There is no height or weight on file to calculate BMI. Physical Exam: /80 (BP Location: Right arm)   Pulse 71   Temp 97.5 °F (36.4 °C) (Oral)   Resp 19   LMP  (LMP Unknown)   SpO2 97%   General appearance: alert and oriented, in no acute distress  Head: Normocephalic, without obvious abnormality, atraumatic  Eyes: conjunctivae/corneas clear. PERRL, EOM's intact. Fundi benign. Neck: no adenopathy, no carotid bruit, no JVD and supple, symmetrical, trachea midline  Lungs: clear to auscultation bilaterally  Heart: regular rate and rhythm, S1, S2 normal, no murmur, click, rub or gallop  Abdomen: soft, non-tender; bowel sounds normal; no masses,  no organomegaly  Extremities: extremities normal, warm and well-perfused; no cyanosis, clubbing, or edema     Neurologic exam:  Awake and alert with appropriate mental status and language function. No evidence of aphasia on exam today. No EOM or facial deficit. R homonymous hemianopsia. Motor testing reveals 5/5 strength in the bilateral upper and lower extremities with no drift. Sensation is intact to light touch in the bilateral upper and lower extremities. Coordination testing is unremarkable.     Lab, Imaging and other studies:   CBC:   Results from last 7 days   Lab Units 07/28/23  0455 07/27/23  1217   WBC Thousand/uL 9.46 8.52   RBC Million/uL 5.06 4.95   HEMOGLOBIN g/dL 15.2 14.9   HEMATOCRIT % 43.2 42.4   MCV fL 85 86   PLATELETS Thousands/uL 251 249   , BMP/CMP:   Results from last 7 days   Lab Units 07/28/23  0455 07/27/23  1217   SODIUM mmol/L 138 138   POTASSIUM mmol/L 3.6 3.9   CHLORIDE mmol/L 105 103   CO2 mmol/L 24 25   BUN mg/dL 18 19   CREATININE mg/dL 1.41* 1.54*   CALCIUM mg/dL 10.6* 10.7*   EGFR ml/min/1.73sq m 36 32   , HgBA1C:   Results from last 7 days   Lab Units 07/27/23  1217   HEMOGLOBIN A1C % 9.2*  9.1*   , Coagulation:   , Lipid Profile:   Results from last 7 days   Lab Units 07/28/23  0455   HDL mg/dL 42*   LDL CALC mg/dL 260*   TRIGLYCERIDES mg/dL 329*     VTE Prophylaxis: Heparin

## 2023-07-29 NOTE — DISCHARGE SUMMARY
[address]  Discharge- Cynthia Gonzalez 1945, 70 y.o. female MRN: [id number]  Unit/Bed#: [facility] Encounter: [id number]  Primary Care Provider: Krystyna Anaya MD   Date and time admitted to hospital: 7/27/2023 11:34 AM    * CVA (cerebral vascular accident) [facility])  Assessment & Plan  · Patient presented to the ED with dysarthria, weakness, reduced short memory which had been progressing x 5 days  · CTA Head/Neck (7/27/23): Acute to subacute infarct  · Neurology consult, appreciate input  · MRI Brain (7/27/23): 1. Large area of acute to subacute ischemia left PCA distribution occipital lobe. 2. Small foci of acute to subacute ischemia in the left frontal and left parietal/temporal lobes MCA distribution. · She is at high risk for recurrence of stroke  · Cotinue Aspirin, Brilinta [newly started]  · Intolerance to statins in the past; however, PCSK9 inhibitors non-formulary; agreeable to starting Pravastatin. · PT/OT recommending STR; patient refusing, wants to go home. · Patient with worsening carotid stenosis; has worsened from 50% to >80% in approx 1 year. · Vascular surgery consult, appreciate input  · Will need to start medication such as Repatha or Praluent  · Will eventually need to wait approx.  4 wks before carotid intervention  · Recommending Cardiac clearance  · Noted to be at a moderate risk; planning for an outpatient stress test/echocardiogram within the next week    Hypertensive urgency  Assessment & Plan  · Present on admission  · Resumed home meds  · Has resolved; 159/83    Diabetes [facility])  Assessment & Plan  Lab Results   Component Value Date    HGBA1C 9.1 (H) 07/27/2023    HGBA1C 9.2 (H) 07/27/2023       Recent Labs     07/28/23  1121 07/28/23  1659 07/28/23  2059 07/29/23  0837   POCGLU 263* 146* 234* 208*       • Chronic, uncontrolled, as evidenced by a hemoglobin A1C of 9.1  • Continue patient on insulin sliding scale; will add low dose lantus at bedtime; titrate as needed  • Diabetes supplies sent home with patient; instructed to check blood glucose levels AC/HS and keep a log  • Follow up with PCP regarding oral medication vs insulin  • [facility] diet    Symptomatic carotid artery stenosis, left  Assessment & Plan  · Noted on CTA head and neck  · Recommending TCAR in 4 weeks  · See full note above    Medical Problems     Resolved Problems  Date Reviewed: 7/29/2023   None       Discharging Physician / Practitioner: GRACIELA Cheung  PCP: Grayling Cushing, MD  Admission Date:   Admission Orders (From admission, onward)     Ordered        07/27/23 1555  Inpatient Admission  Once                      Discharge Date: 07/29/23    Consultations During Hospital Stay:  [address]  IP CONSULT TO CASE MANAGEMENT  IP CONSULT TO NUTRITION SERVICES  IP CONSULT TO VASCULAR SURGERY  IP CONSULT TO CARDIOLOGY    Procedures Performed:   · None    Significant Findings / Test Results:   · CTA Head/Neck (7/27/23): 1. Hypodensity and loss of gray-white matter differentiation involving left occipital and inferior parietal lobe concerning for evolving acute or subacute infarct. 2.  Chronic microangiopathic change. 3.  High-grade stenosis of the left PCA at proximal P2 segment. Occluded versus hypoplastic left P1 segment with patent P-comm perfusing left PCA. 4.  Severe left and moderate right bilateral supraclinoid ICA stenosis. 5.  Severe stenosis of the left vertebral artery origin. Patent narrow caliber left vertebral artery in the cervical and pre-PICA intracranial segments with multifocal severe stenosis. Post PICA segment is occluded. 6.  Multifocal high-grade stenosis of the right intracranial vertebral artery. 7. Advanced atherosclerotic change of bilateral cervical carotid arteries. Estimated 85-90% left and 80% right proximal ICA stenosis. 8.  Atherosclerotic disease of the aortic arch with linear defect at the origin of left subclavian artery.  Differentials include ulcerated plaque versus focal dissection flap (likely chronic). · MRI Brain (7/27/23): 1. Large area of acute to subacute ischemia left PCA distribution occipital lobe. 2. Small foci of acute to subacute ischemia in the left frontal and left parietal/temporal lobes MCA distribution. · VAS Carotid Duplex (7/28/23): RIGHT: There is 70+% stenosis noted in the internal carotid artery. Plaque is heterogenous/homogenous and irregular/smooth. Vertebral artery flow is antegrade. There is no significant subclavian artery disease. LEFT: There is 70+% stenosis noted in the internal carotid artery. Plaque is heterogenous/homogenous and irregular/smooth. Vertebral artery flow is antegrade. There is no significant subclavian artery disease. Incidental Findings:   · None    Test Results Pending at Discharge (will require follow up): · None     Outpatient Tests Requested:  · Follow up with PCP within 1 week  · Follow up with Cardiology for echo / stress test  · Follow up with Vascular surgery for TCAR work - up  · Follow up with Neurology    Complications:  None    Reason for Admission: CVA    Hospital Course:   Augusto Toure is a 70 y.o. female patient with past medical history of hypertension, hyperlipidemia, diabetes mellitus who originally presented to the hospital on 7/27/2023 due to dysarthria, weakness and reduced short term memory for approx 5 days before coming into the hospital.  [place] completed on admission which was concerning for a CVA and an MRI was completed. Neurology consulted. She was maintained on the stroke pathway, lipid panel completed which was still uncontrolled, however, patient has been unable to tolerate statins in the past.  She had an US of her carotid arteries which were also concerning for worsening build up and vascular was consulted. They are recommending patient to go home with PCSK9, which required prior auth.   She will continue to take Pravastatin daily until she is able to get the Praluent, she is okay with this plan and Neurology okay'ed plan as well. Vital signs have improved. She will plan to go for TCAR in approx 4 weeks but will require echo and stress test for cardiac clearance first.  She has been cleared for discharge. She had been given a STR recommendation but is refusing at this time. Please see above list of diagnoses and related plan for additional information. Condition at Discharge: stable    Discharge Day Visit / Exam:   Subjective:  Patient resting in bed, son at bedside. Denies complaints of chest pain, shortness of breath, lightheadedness or worsening visual changes. Vitals: Blood Pressure: 124/80 (07/29/23 0845)  Pulse: 71 (07/29/23 0845)  Temperature: 97.5 °F (36.4 °C) (07/29/23 0845)  Temp Source: Oral (07/29/23 0845)  Respirations: 19 (07/29/23 0845)  SpO2: 97 % (07/29/23 0845)     Exam:   Physical Exam  Vitals and nursing note reviewed. Constitutional:       General: She is not in acute distress. Appearance: She is well-developed. She is obese. She is ill-appearing. Eyes:      General: Visual field deficit present. Cardiovascular:      Rate and Rhythm: Normal rate and regular rhythm. Pulmonary:      Effort: Pulmonary effort is normal.      Breath sounds: Normal breath sounds. Abdominal:      Palpations: Abdomen is soft. Musculoskeletal:         General: No swelling. Skin:     General: Skin is warm and dry. Coloration: Skin is pale. Neurological:      Mental Status: She is alert and oriented to person, place, and time. Cranial Nerves: No dysarthria. Psychiatric:         Mood and Affect: Mood normal.          Discussion with Family: Updated  (son) at bedside. Discharge instructions/Information to patient and family:   See after visit summary for information provided to patient and family.       Provisions for Follow-Up Care:  See after visit summary for information related to follow-up care and any pertinent home health orders. Disposition:   Home with VNA Services (Reminder: Complete face to face encounter)    Planned Readmission: None     Discharge Statement:  I spent 35 minutes discharging the patient. This time was spent on the day of discharge. I had direct contact with the patient on the day of discharge. Greater than 50% of the total time was spent examining patient, answering all patient questions, arranging and discussing plan of care with patient as well as directly providing post-discharge instructions. Additional time then spent on discharge activities. Discharge Medications:  See after visit summary for reconciled discharge medications provided to patient and/or family.       **Please Note: This note may have been constructed using a voice recognition system**

## 2023-07-29 NOTE — CASE MANAGEMENT
Case Management Discharge Planning Note    Patient name Anne Rey  Location /-96 MRN [id number]  : 1945 Date 2023       Current Admission Date: 2023  Current Admission Diagnosis:CVA (cerebral vascular accident) [facility])   Patient Active Problem List    Diagnosis Date Noted   • Hypertensive urgency 2023   • Aortoiliac occlusive disease ([address]) 10/11/2022   • History of gastrointestinal stromal tumor (GIST) 2022   • Secondary hyperparathyroidism of renal origin ([address]) 2022   • 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   • Diabetes ([address]) 2021   • Type 2 diabetes mellitus with diabetic peripheral angiopathy without gangrene ([address]) 2021   • Embolism and 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 ([address]) 2021   • Depression, recurrent ([address]) 2021   • Obesity, morbid ([address]) 2021   • Stage 4 chronic kidney disease ([address]) 2020   • Hypertensive kidney disease with stage 4 chronic kidney disease ([address]) 2020   • Cervical radiculopathy 2020   • Malignant neoplasm of overlapping sites of bladder ([address]) 2020   • Stenosis of left anterior descending artery Mid LAD 50-60% stenosis 2020   • 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 2020   • Pulmonary nodule 7 mm groundglass opacity in the right upper lobe, unchanged since 2020   • Atherosclerosis of native arteries of extremities with intermittent claudication, bilateral legs ([address]) 2020   • Symptomatic carotid artery stenosis, left 2020   • Hypertensive kidney disease with stage 3 chronic kidney disease ([address]) 2020   • Femoral artery stenosis, right (HCC) 50-75% stenosis in the common femoral artery.  2020   • Mesenteric artery stenosis (HCC) 2020   • Positive cardiac stress test  small, mildly severe, partially reversible myocardial perfusion defect of anterior and inferior wall  11/12/2019   • Abnormal CT of the chest suspicious for an infectious or inflammatory bronchiolitis.  11/07/2019   • Celiac artery stenosis (HCC) >70% stenosis in the celiac trunk 11/05/2019   • Median arcuate ligament syndrome ([address]) 11/05/2019   • Decreased pulses in feet 10/22/2019   • Atherosclerosis of renal artery ([address]) 07/01/2019   • Aortoiliac stenosis, left ([address]) 02/25/2019   • Spondylosis of lumbar region without myelopathy or radiculopathy 01/29/2019   • Lumbosacral spondylosis without myelopathy 01/29/2019   • Statin intolerance 01/22/2019   • Lumbar disc herniation 01/22/2019   • Pain of left lower extremity 01/22/2019   • Herniated lumbar intervertebral disc 01/22/2019   • Spinal stenosis 09/19/2018   • Breast mass 12/04/2017   • Chronic GERD 12/04/2017   • Stage 3 chronic kidney disease ([address]) 12/04/2017   • Claudication in peripheral vascular disease ([address]) 12/04/2017   • Gout 12/04/2017   • Hx-TIA (transient ischemic attack) 12/04/2017   • Hyperlipidemia associated with type 2 diabetes mellitus ([address]) 12/04/2017   • Hypertension associated with diabetes ([address]) 12/04/2017   • Osteoarthritis 12/04/2017   • Obesity (BMI 30-39.9) 12/04/2017   • Right renal artery stenosis ([address]) 12/04/2017   • 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   • Neurodermatitis 12/04/2017   • Obesity with body mass index 30 or greater 12/04/2017   • Hyperlipidemia, unspecified 12/04/2017   • Vitamin D deficiency 09/08/2016   • Basilar artery stenosis 06/22/2016   • Type 2 diabetes mellitus with diabetic nephropathy ([address]) 12/19/2015   • Hypercholesteremia 12/19/2015   • Hypertension 12/19/2015   • Polyneuropathy 12/19/2015   • CVA (cerebral vascular accident) ([address]) 11/09/2015      LOS (days): 2  Geometric Mean LOS (GMLOS) (days): 2.90  Days to GMLOS:1.1     OBJECTIVE:  Risk of Unplanned Readmission Score: 13.32         Current admission status: Inpatient   Preferred Pharmacy:   [address], [address], [place] - [address]  [address]  [address] [address]  Phone: [phone] Fax: [phone]    [facility]/pharmacy [id number] - [address] [facility] [address] [place] [address]  Phone: [phone] Fax: [phone]    Primary Care Provider: Ezequiel Torres MD    Primary Insurance: [facility]  Secondary Insurance:     DISCHARGE DETAILS:    Discharge planning discussed with[de-identified] patient and son at bedside  Freedom of Choice: Yes  Comments - Freedom of Choice: [facility]  CM contacted family/caregiver?: Yes  Were Treatment Team discharge recommendations reviewed with patient/caregiver?: Yes  Did patient/caregiver verbalize understanding of patient care needs?: Yes  Were patient/caregiver advised of the risks associated with not following Treatment Team discharge recommendations?: Yes    Contacts  Patient Contacts: Chandrika Hampton (son)  Relationship to Patient[de-identified] Family  Contact Method: In Person  Reason/Outcome: Continuity of Care, Referral, Emergency Contact, Discharge [facility]         Is the patient interested in [facility] at discharge?: Yes  [facility] requested[de-identified] Occupational Therapy, Nursing, Physical [address] [facility] Name[de-identified] [address]  Provider[de-identified] PCP  Home Health Services Needed[de-identified] Evaluate Functional Status and Safety, Gait/ADL Training, Strengthening/Theraputic Exercises to Improve Function  Homebound Criteria Met[de-identified] Uses an Assist Device (i.e. cane, walker, etc), Requires the Assistance of Another Person for Safe Ambulation or to Leave the Home  Supporting Clincal Findings[de-identified] Fatigues Easliy in [employer]    DME Referral Provided  Referral made for DME?: No    Other Referral/Resources/Interventions Provided:  Interventions: [facility]  Referral Comments: CM met with patient and son at bedside to review only accepting [address].  Patient and son both agreeable- reserved in [address] and added to follow up providers. Both aware agency will be in touch in 24-48hrs to schedule initial appointment. CM to upload AVS and [address] orders to [address] as able. CM also provided patient with Brilinta discount card. No further CM needs anticipated at this time.     Would you like to participate in our [facility] service program?  : No - Declined    Treatment Team Recommendation: Short Term Rehab  Discharge Destination Plan[de-identified] Home with [facility] at Discharge : Family      IMM Given (Date):: 07/28/23  IMM Given to[de-identified] Patient

## 2023-07-31 ENCOUNTER — TRANSITIONAL CARE MANAGEMENT (OUTPATIENT)
Dept: INTERNAL MEDICINE CLINIC | Facility: CLINIC | Age: 78
End: 2023-07-31

## 2023-07-31 NOTE — UTILIZATION REVIEW
NOTIFICATION OF ADMISSION DISCHARGE   This is a Notification of Discharge from [address] [place] [address]. Please be advised that this patient has been discharge from our facility. Below you will find the admission and discharge date and time including the patient’s disposition. UTILIZATION REVIEW CONTACT:  Silvano Busch  Utilization   Network Utilization Review Department  Phone: [phone] x carefully listen to the prompts. All voicemails are confidential.  Email: [email or web address]     ADMISSION INFORMATION  PRESENTATION DATE: 7/27/2023 11:34 AM  OBERVATION ADMISSION DATE:   INPATIENT ADMISSION DATE: 7/27/23  3:55 PM   DISCHARGE DATE: 7/29/2023  1:06 PM   DISPOSITION:Home with Home Health Care    IMPORTANT INFORMATION:  Send all requests for admission clinical reviews, approved or denied determinations and any other requests to dedicated fax number below belonging to the campus where the patient is receiving treatment.  List of dedicated fax numbers:  [place] DENIALS (Administrative/Medical Necessity) [phone] [facility] (Maternity/NICU/Pediatrics) [phone]   [facility] [phone]   [facility] [phone] [facility] [phone] [facility] [phone]   [facility] [phone]   [facility] [phone]   [address] [facility] [phone] 3[facility] [phone] [facility] [address] [facility] [phone]

## 2023-08-01 ENCOUNTER — OFFICE VISIT (OUTPATIENT)
Dept: VASCULAR SURGERY | Facility: CLINIC | Age: 78
End: 2023-08-01
Payer: COMMERCIAL

## 2023-08-01 ENCOUNTER — TELEPHONE (OUTPATIENT)
Dept: VASCULAR SURGERY | Facility: CLINIC | Age: 78
End: 2023-08-01

## 2023-08-01 ENCOUNTER — TELEPHONE (OUTPATIENT)
Dept: NEPHROLOGY | Facility: CLINIC | Age: 78
End: 2023-08-01

## 2023-08-01 VITALS
HEIGHT: 58 IN | HEART RATE: 68 BPM | SYSTOLIC BLOOD PRESSURE: 128 MMHG | DIASTOLIC BLOOD PRESSURE: 80 MMHG | BODY MASS INDEX: 34.49 KG/M2

## 2023-08-01 DIAGNOSIS — K55.1 MESENTERIC ARTERY STENOSIS (HCC): ICD-10-CM

## 2023-08-01 DIAGNOSIS — N18.4 STAGE 4 CHRONIC KIDNEY DISEASE (HCC): ICD-10-CM

## 2023-08-01 DIAGNOSIS — E78.5 HYPERLIPIDEMIA ASSOCIATED WITH TYPE 2 DIABETES MELLITUS (HCC): ICD-10-CM

## 2023-08-01 DIAGNOSIS — Z78.9 STATIN INTOLERANCE: ICD-10-CM

## 2023-08-01 DIAGNOSIS — E78.2 MIXED HYPERLIPIDEMIA: ICD-10-CM

## 2023-08-01 DIAGNOSIS — I73.9 CLAUDICATION IN PERIPHERAL VASCULAR DISEASE (HCC): ICD-10-CM

## 2023-08-01 DIAGNOSIS — I65.22 SYMPTOMATIC CAROTID ARTERY STENOSIS, LEFT: Primary | ICD-10-CM

## 2023-08-01 DIAGNOSIS — I77.1 CELIAC ARTERY STENOSIS (HCC): ICD-10-CM

## 2023-08-01 DIAGNOSIS — I63.9 CVA (CEREBRAL VASCULAR ACCIDENT) (HCC): ICD-10-CM

## 2023-08-01 DIAGNOSIS — E11.69 HYPERLIPIDEMIA ASSOCIATED WITH TYPE 2 DIABETES MELLITUS (HCC): ICD-10-CM

## 2023-08-01 PROCEDURE — 99215 OFFICE O/P EST HI 40 MIN: CPT | Performed by: SURGERY

## 2023-08-01 RX ORDER — PRAVASTATIN SODIUM 80 MG/1
80 TABLET ORAL
Qty: 90 TABLET | Refills: 1 | Status: SHIPPED | OUTPATIENT
Start: 2023-08-01 | End: 2023-08-15

## 2023-08-01 RX ORDER — CHLORHEXIDINE GLUCONATE 0.12 MG/ML
15 RINSE ORAL ONCE
OUTPATIENT
Start: 2023-08-01 | End: 2023-08-01

## 2023-08-01 RX ORDER — SODIUM CHLORIDE 9 MG/ML
125 INJECTION, SOLUTION INTRAVENOUS CONTINUOUS
OUTPATIENT
Start: 2023-08-01 | End: 2023-08-01

## 2023-08-01 NOTE — LETTER
23    Re: Nephrology Clearance    Patient Name: Augusto Toure   Patient : 1945   Patient MRN: [id number]   Patient Phone: [phone]     Dr. Dmitriy Valdovinos MD is requesting clearance for above patient, prior to proceeding with transcarotid artery revascularization (TCAR) . Patient's procedure will be scheduled at [address] [place] [address] once clearance has been obtained. Patient will be given  TBD . We spoke with your office today regarding clearance request.  PLEASE CALL PATIENT AND SCHEDULE ASAP. Please fax your recommendations, including any medication recommendations, to [phone], attention nursing. Or route your recommendations to [facility] pool [facility] Clearance Pool [[id number]]. Please reach out with any questions or concerns.     Sincerely,    [facility]

## 2023-08-01 NOTE — TELEPHONE ENCOUNTER
Good Afternoon      Dr. Bony Colby from [facility] called the office in regards of patient.  As per Liz Reyes patient will be in need of surgery  Soon and is going to needs an appointment for pre op clearance    If you need any more information please contact Liz Reyes at [phone]    Thank you

## 2023-08-01 NOTE — PROGRESS NOTES
Assessment/Plan:    Symptomatic carotid artery stenosis, left  High grade left ICA stenosis. Lesion is at C1 - C2 level and she is very high risk for carotid endarterectomy due to multivessel CAD and CKD4. Best option would be TCAR. There is a curvature in the ICA beyond lesion so that could pose a challenge. Risks and benefits discussed with patient and son today. Risks of stroke discussed. Continue brillanta and aspirin. She is now tolerating pravastatin     Operative Scheduling Information:    Hospital:  [facility]    Physician:  Merrick Alexander    Surgery: Left TCAR    Urgency:  Standard    Level:  Level 3: Outpatients to be scheduled for elective surgery than can be delayed up to 4 weeks without reasonable expectation of detriment to patient    Case Length:  Normal    Post-op Bed:  Stepdown    OR Table:  Discovery    Equipment Needs:  Rep: [address]    Medication Instructions:  Aspirin:   Continue (do not hold)  Other: Brillanta  Continue (do not hold)    Hydration:  100 cc/hr for 2 hour prior and 4 hours after procedure    Contrast Allergy:  no      Statin intolerance  Tolerating statin therapy with pravachol, continue with this one. Stage 4 chronic kidney disease [facility])  Lab Results   Component Value Date    EGFR 36 07/28/2023    EGFR 32 07/27/2023    EGFR 29 06/14/2023    CREATININE 1.41 (H) 07/28/2023    CREATININE 1.54 (H) 07/27/2023    CREATININE 1.69 (H) 06/14/2023     Will give iv hydration prior to surgery and contrast    Celiac artery stenosis (HCC) >70% stenosis in the celiac trunk  No abdominal pain. No follow up at this time. Diagnoses and all orders for this visit:    Symptomatic carotid artery stenosis, left  -     Case request operating room: LEFT TCAR; Standing  -     Ambulatory referral to Cardiology;  Future  -     XR chest pa & lateral; Future  -     Case request operating room: LEFT TCAR    Mesenteric artery stenosis ([address])  -     Ambulatory Referral to Vascular Surgery    Mixed hyperlipidemia    Claudication in peripheral vascular disease (HCC)    Statin intolerance    Stage 4 chronic kidney disease ([address])  -     Case request operating room: LEFT TCAR; Standing  -     Case request operating room: LEFT TCAR    Celiac artery stenosis (HCC) >70% stenosis in the celiac trunk    Other orders  -     Diet NPO; Sips with meds; Standing  -     Void on call to OR; Standing  -     Insert peripheral IV; Standing  -     Nursing Communication CHG bath, have staff wash entire body (neck down) per pre op bathing protocol. Routine, evening prior to, and day of surgery.; Standing  -     Nursing Communication Swab both nares with Povidone-Iodine solution, EXCLUDE if patient has shellfish/Iodine allergy, and replace with nasal alcohol swabstick. Routine, day of surgery, on call to OR.; Standing  -     chlorhexidine (PERIDEX) 0.12 % oral rinse 15 mL  -     Shower/scrub; Standing  -     Place sequential compression device; Standing  -     sodium chloride 0.9 % infusion          Subjective:      Patient ID: Jacy Saha is a 70 y.o. female. New patient, presents as an ED follow-up to discuss TCAR. Patient reports residual effects since CVA. Patient reports R sided- weakness. HPI  Right arm weakness, speech and vision abnormalities persist but improved. She is walking and is now taking shower on her own. History obtained from patient and son. The following portions of the patient's history were reviewed and updated as appropriate: allergies, current medications, past family history, past medical history, past social history, past surgical history and problem list.    Review of Systems   Constitutional: Negative. HENT: Negative. Eyes: Positive for visual disturbance. Respiratory: Negative. Cardiovascular: Negative. Gastrointestinal: Negative. Endocrine: Negative. Genitourinary: Negative. Musculoskeletal: Positive for gait problem. Skin: Negative. Allergic/Immunologic: Negative. Neurological: Positive for weakness. Hematological: Negative. Psychiatric/Behavioral: Negative. I have reviewed the ROS as entered and made changes as necessary. Objective:      /80 (BP Location: Left arm, Patient Position: Sitting, Cuff Size: Standard)   Pulse 68   Ht 4' 10" (1.473 m)   LMP  (LMP Unknown)   BMI 34.49 kg/m²          Physical Exam  Vitals and nursing note reviewed. Constitutional:       Appearance: Normal appearance. HENT:      Head: Normocephalic and atraumatic. Neck:      Vascular: Carotid bruit present. Cardiovascular:      Rate and Rhythm: Normal rate and regular rhythm. Heart sounds: Normal heart sounds. Pulmonary:      Effort: Pulmonary effort is normal.      Breath sounds: Normal breath sounds. Abdominal:      Palpations: Abdomen is soft. Musculoskeletal:      Cervical back: Normal range of motion and neck supple. No tenderness. Right lower leg: Edema present. Left lower leg: Edema present. Skin:     Capillary Refill: Capillary refill takes less than 2 seconds. Neurological:      General: No focal deficit present. Mental Status: She is alert and oriented to person, place, and time.    Psychiatric:         Mood and Affect: Mood normal.         Behavior: Behavior normal.

## 2023-08-01 NOTE — PATIENT INSTRUCTIONS
Plan for left carotid stent procedure with neck cutdown. TCAR. Continue aspirin and brillanta and pravachol.

## 2023-08-01 NOTE — TELEPHONE ENCOUNTER
REMINDER: Under Reason For Call, comments MUST be formatted as:   (Surgeon's Initials) / (Procedure)      Special Instructions / FYI:     Procedure: Left TCAR    Level: 3 - Route clearance(s) to [facility] Clearance Pool     Allergies: Fenofibrate, Colesevelam, Colestipol, Ezetimibe, and Statins    Instructions Given: NO Bowel Prep General Instructions     Dialysis: Patient is not on dialysis. Return Visit Required Prior to Procedure: No.    Consent: I certify that patient has signed, printed, timed, and dated their surgery consent. I certify that the patient's LEGAL NAME and DATE OF BIRTH are written in the upper left corner on BOTH sides of the consent. I certify that BOTH sides of the completed surgery consent have been scanned into the patient's Epic chart by myself on 8/1/2023. Yes, I have LABELED the consent in Epic as Consent for Vascular Procedure. For Surgical Clearances     Levels   1-3   ROUTE this encounter to [facility] Clearance Pool (AND)   [facility] Surgery Coordinator Pool     Level   4   ROUTE this encounter to [facility] Surgery Coordinator Pool       HYDRATION CLEARANCES   ONLY ROUTE TO  [facility] Clearance Pool     (2) TWO CLEARANCES NEEDED - Cardiology  Clearance // Clearing Provider's Name (Ankit Kerr): DR [id number] //  Spoke with: Angélica Fontenot  //  Office Contact Information P: [phone] - F: EPIC POOL  (AND)   Nephrology Clearance // Clearing Provider's Name (Ankit Kerr): DR Bryan Monroe //  Spoke with: LEFT MESSAGE  //  Office Contact Information P: [phone] - F: EPIC POOL    Yes, I have ROUTED this encounter to [facility] Surgery Coordinator and/or [facility] Clearance Pool.

## 2023-08-01 NOTE — LETTER
23    Re: Cardiology  Clearance    Patient Name: Emiliano Elmore   Patient : 1945   Patient MRN: [id number]   Patient Phone: [phone]     DR ENNIS ,    . Alvarado Jerez MD is requesting clearance for above patient, prior to proceeding with transcarotid artery revascularization (TCAR) . Patient's procedure will be scheduled at [address] [place] [address] once clearance has been obtained. Patient will be given  TBD . We spoke with your office today regarding clearance request.   Dottie Pham has scheduled patient's clearance appointment for 23 at 11:20  AM in your [facility] office. Please fax your recommendations, including any medication recommendations, to [phone], attention nursing. Or route your recommendations to [facility] pool [facility] Clearance Pool [[id number]]. Please reach out with any questions or concerns.     Sincerely,    [facility]

## 2023-08-02 ENCOUNTER — TELEPHONE (OUTPATIENT)
Dept: LAB | Facility: HOSPITAL | Age: 78
End: 2023-08-02

## 2023-08-02 ENCOUNTER — TELEPHONE (OUTPATIENT)
Dept: NEPHROLOGY | Facility: CLINIC | Age: 78
End: 2023-08-02

## 2023-08-02 DIAGNOSIS — N18.4 STAGE 4 CHRONIC KIDNEY DISEASE (HCC): ICD-10-CM

## 2023-08-02 DIAGNOSIS — E79.0 HYPERURICEMIA: Primary | ICD-10-CM

## 2023-08-03 ENCOUNTER — APPOINTMENT (OUTPATIENT)
Dept: LAB | Facility: HOSPITAL | Age: 78
End: 2023-08-03
Attending: INTERNAL MEDICINE
Payer: COMMERCIAL

## 2023-08-03 ENCOUNTER — TELEPHONE (OUTPATIENT)
Dept: INTERNAL MEDICINE CLINIC | Facility: CLINIC | Age: 78
End: 2023-08-03

## 2023-08-03 DIAGNOSIS — N18.4 STAGE 4 CHRONIC KIDNEY DISEASE (HCC): ICD-10-CM

## 2023-08-03 DIAGNOSIS — E79.0 HYPERURICEMIA: ICD-10-CM

## 2023-08-03 LAB
25(OH)D3 SERPL-MCNC: 24.5 NG/ML (ref 30–100)
ANION GAP SERPL CALCULATED.3IONS-SCNC: 10 MMOL/L
BASOPHILS # BLD AUTO: 0.05 THOUSANDS/ÂΜL (ref 0–0.1)
BASOPHILS NFR BLD AUTO: 1 % (ref 0–1)
BUN SERPL-MCNC: 21 MG/DL (ref 5–25)
CALCIUM SERPL-MCNC: 10.5 MG/DL (ref 8.4–10.2)
CHLORIDE SERPL-SCNC: 103 MMOL/L (ref 96–108)
CO2 SERPL-SCNC: 23 MMOL/L (ref 21–32)
CREAT SERPL-MCNC: 1.8 MG/DL (ref 0.6–1.3)
EOSINOPHIL # BLD AUTO: 0.3 THOUSAND/ÂΜL (ref 0–0.61)
EOSINOPHIL NFR BLD AUTO: 5 % (ref 0–6)
ERYTHROCYTE [DISTWIDTH] IN BLOOD BY AUTOMATED COUNT: 13.1 % (ref 11.6–15.1)
GFR SERPL CREATININE-BSD FRML MDRD: 26 ML/MIN/1.73SQ M
GLUCOSE P FAST SERPL-MCNC: 271 MG/DL (ref 65–99)
HCT VFR BLD AUTO: 40.4 % (ref 34.8–46.1)
HGB BLD-MCNC: 13.9 G/DL (ref 11.5–15.4)
IMM GRANULOCYTES # BLD AUTO: 0.06 THOUSAND/UL (ref 0–0.2)
IMM GRANULOCYTES NFR BLD AUTO: 1 % (ref 0–2)
LYMPHOCYTES # BLD AUTO: 1.12 THOUSANDS/ÂΜL (ref 0.6–4.47)
LYMPHOCYTES NFR BLD AUTO: 19 % (ref 14–44)
MCH RBC QN AUTO: 29.8 PG (ref 26.8–34.3)
MCHC RBC AUTO-ENTMCNC: 34.4 G/DL (ref 31.4–37.4)
MCV RBC AUTO: 87 FL (ref 82–98)
MONOCYTES # BLD AUTO: 0.62 THOUSAND/ÂΜL (ref 0.17–1.22)
MONOCYTES NFR BLD AUTO: 10 % (ref 4–12)
NEUTROPHILS # BLD AUTO: 3.89 THOUSANDS/ÂΜL (ref 1.85–7.62)
NEUTS SEG NFR BLD AUTO: 64 % (ref 43–75)
NRBC BLD AUTO-RTO: 0 /100 WBCS
PHOSPHATE SERPL-MCNC: 3.3 MG/DL (ref 2.3–4.1)
PLATELET # BLD AUTO: 259 THOUSANDS/UL (ref 149–390)
PMV BLD AUTO: 10.5 FL (ref 8.9–12.7)
POTASSIUM SERPL-SCNC: 4.2 MMOL/L (ref 3.5–5.3)
PTH-INTACT SERPL-MCNC: 111.9 PG/ML (ref 12–88)
RBC # BLD AUTO: 4.66 MILLION/UL (ref 3.81–5.12)
SODIUM SERPL-SCNC: 136 MMOL/L (ref 135–147)
URATE SERPL-MCNC: 6.2 MG/DL (ref 2–7.5)
WBC # BLD AUTO: 6.04 THOUSAND/UL (ref 4.31–10.16)

## 2023-08-03 PROCEDURE — 84100 ASSAY OF PHOSPHORUS: CPT

## 2023-08-03 PROCEDURE — 84550 ASSAY OF BLOOD/URIC ACID: CPT

## 2023-08-03 PROCEDURE — 80048 BASIC METABOLIC PNL TOTAL CA: CPT

## 2023-08-03 PROCEDURE — 83970 ASSAY OF PARATHORMONE: CPT

## 2023-08-03 PROCEDURE — 85025 COMPLETE CBC W/AUTO DIFF WBC: CPT

## 2023-08-03 PROCEDURE — 84165 PROTEIN E-PHORESIS SERUM: CPT

## 2023-08-03 PROCEDURE — 36415 COLL VENOUS BLD VENIPUNCTURE: CPT

## 2023-08-03 PROCEDURE — 82306 VITAMIN D 25 HYDROXY: CPT

## 2023-08-03 PROCEDURE — 84165 PROTEIN E-PHORESIS SERUM: CPT | Performed by: STUDENT IN AN ORGANIZED HEALTH CARE EDUCATION/TRAINING PROGRAM

## 2023-08-03 NOTE — TELEPHONE ENCOUNTER
FYI: Home health evaluation yesterday. Right sided intention. Patient will be seen twice a weeks for five weeks and  Once a week for three weeks.

## 2023-08-04 ENCOUNTER — TELEPHONE (OUTPATIENT)
Dept: NEPHROLOGY | Facility: CLINIC | Age: 78
End: 2023-08-04

## 2023-08-04 ENCOUNTER — APPOINTMENT (OUTPATIENT)
Dept: LAB | Facility: HOSPITAL | Age: 78
End: 2023-08-04

## 2023-08-04 NOTE — TELEPHONE ENCOUNTER
I called and left message on patients answering machine confirming appt for Monday 08/07/2023 with Dr Damien Martínez

## 2023-08-05 ENCOUNTER — APPOINTMENT (OUTPATIENT)
Dept: LAB | Facility: HOSPITAL | Age: 78
End: 2023-08-05
Payer: COMMERCIAL

## 2023-08-05 LAB
ALBUMIN SERPL ELPH-MCNC: 4.16 G/DL (ref 3.2–5.1)
ALBUMIN SERPL ELPH-MCNC: 58.6 % (ref 48–70)
ALPHA1 GLOB SERPL ELPH-MCNC: 0.33 G/DL (ref 0.15–0.47)
ALPHA1 GLOB SERPL ELPH-MCNC: 4.6 % (ref 1.8–7)
ALPHA2 GLOB SERPL ELPH-MCNC: 0.76 G/DL (ref 0.42–1.04)
ALPHA2 GLOB SERPL ELPH-MCNC: 10.7 % (ref 5.9–14.9)
BACTERIA UR QL AUTO: ABNORMAL /HPF
BETA GLOB ABNORMAL SERPL ELPH-MCNC: 0.38 G/DL (ref 0.31–0.57)
BETA1 GLOB SERPL ELPH-MCNC: 5.4 % (ref 4.7–7.7)
BETA2 GLOB SERPL ELPH-MCNC: 6.2 % (ref 3.1–7.9)
BETA2+GAMMA GLOB SERPL ELPH-MCNC: 0.44 G/DL (ref 0.2–0.58)
BILIRUB UR QL STRIP: NEGATIVE
CLARITY UR: ABNORMAL
COLOR UR: YELLOW
GAMMA GLOB ABNORMAL SERPL ELPH-MCNC: 1.03 G/DL (ref 0.4–1.66)
GAMMA GLOB SERPL ELPH-MCNC: 14.5 % (ref 6.9–22.3)
GLUCOSE UR STRIP-MCNC: NEGATIVE MG/DL
HGB UR QL STRIP.AUTO: NEGATIVE
IGG/ALB SER: 1.42 {RATIO} (ref 1.1–1.8)
KETONES UR STRIP-MCNC: NEGATIVE MG/DL
LEUKOCYTE ESTERASE UR QL STRIP: ABNORMAL
MUCOUS THREADS UR QL AUTO: ABNORMAL
NITRITE UR QL STRIP: NEGATIVE
NON-SQ EPI CELLS URNS QL MICRO: ABNORMAL /HPF
PH UR STRIP.AUTO: 5.5 [PH]
PROT PATTERN SERPL ELPH-IMP: NORMAL
PROT SERPL-MCNC: 7.1 G/DL (ref 6.4–8.2)
PROT UR STRIP-MCNC: ABNORMAL MG/DL
RBC #/AREA URNS AUTO: ABNORMAL /HPF
SP GR UR STRIP.AUTO: 1.02 (ref 1–1.03)
UROBILINOGEN UR STRIP-ACNC: <2 MG/DL
WBC #/AREA URNS AUTO: ABNORMAL /HPF

## 2023-08-05 PROCEDURE — 81001 URINALYSIS AUTO W/SCOPE: CPT

## 2023-08-05 PROCEDURE — 86335 IMMUNFIX E-PHORSIS/URINE/CSF: CPT

## 2023-08-05 PROCEDURE — 82043 UR ALBUMIN QUANTITATIVE: CPT

## 2023-08-05 PROCEDURE — 82570 ASSAY OF URINE CREATININE: CPT

## 2023-08-05 PROCEDURE — 84166 PROTEIN E-PHORESIS/URINE/CSF: CPT

## 2023-08-07 ENCOUNTER — OFFICE VISIT (OUTPATIENT)
Dept: NEPHROLOGY | Facility: CLINIC | Age: 78
End: 2023-08-07

## 2023-08-07 VITALS
WEIGHT: 160 LBS | HEART RATE: 71 BPM | HEIGHT: 58 IN | OXYGEN SATURATION: 99 % | DIASTOLIC BLOOD PRESSURE: 66 MMHG | RESPIRATION RATE: 16 BRPM | SYSTOLIC BLOOD PRESSURE: 110 MMHG | TEMPERATURE: 97.5 F | BODY MASS INDEX: 33.58 KG/M2

## 2023-08-07 DIAGNOSIS — N18.4 HYPERTENSIVE KIDNEY DISEASE WITH STAGE 4 CHRONIC KIDNEY DISEASE (HCC): ICD-10-CM

## 2023-08-07 DIAGNOSIS — I12.9 HYPERTENSIVE KIDNEY DISEASE WITH STAGE 4 CHRONIC KIDNEY DISEASE (HCC): ICD-10-CM

## 2023-08-07 DIAGNOSIS — N18.4 STAGE 4 CHRONIC KIDNEY DISEASE (HCC): Primary | ICD-10-CM

## 2023-08-07 DIAGNOSIS — R77.8 ABNORMAL SERUM PROTEIN ELECTROPHORESIS: ICD-10-CM

## 2023-08-07 DIAGNOSIS — N25.81 SECONDARY HYPERPARATHYROIDISM OF RENAL ORIGIN (HCC): ICD-10-CM

## 2023-08-07 DIAGNOSIS — E83.52 HYPERCALCEMIA: ICD-10-CM

## 2023-08-07 RX ORDER — CINACALCET 30 MG/1
30 TABLET, FILM COATED ORAL EVERY OTHER DAY
Qty: 45 TABLET | Refills: 2 | Status: SHIPPED | OUTPATIENT
Start: 2023-08-07 | End: 2024-05-03

## 2023-08-07 RX ORDER — CALCITRIOL 0.25 UG/1
0.25 CAPSULE, LIQUID FILLED ORAL EVERY OTHER DAY
Qty: 45 CAPSULE | Refills: 2 | Status: SHIPPED | OUTPATIENT
Start: 2023-08-07 | End: 2023-08-07 | Stop reason: ALTCHOICE

## 2023-08-07 NOTE — PROGRESS NOTES
NEPHROLOGY OFFICE FOLLOW-UP  Teresita Tracy 70 y.o. Female YOB: 1945 MRN: [id number]    Encounter: [id number] DATE: 8/7/2023    REASON FOR VISIT: Teresita Tracy is a 70 y. o.female returns to Nephrology office for further management of chronic kidney disease. HPI:    This is 70-year-old female with past medical history significant for hypertension, hyperlipidemia, spinal stenosis, CVA, peripheral vascular disease, bladder cancer, returns to Nephrology office for further management of CKD stage 4. Patient was last seen in nephrology office on 4/19/2022. Upon review of old medical record from picture review, patient's new baseline creatinine seems to me fluctuating around 1.8. Patient told me during office visit today that she does not want to do dialysis even though it can be life-saving procedure in the future. Patient's son was also present at the time of consultation and history was also obtained from him and all the questions were answered . Patient was also found to have bladder cancer and was seen by urologist-Dr. Marie Garcia and had underwent cystoscopy in the past.    Patient is currently been followed by GI since she was found to have 2.9 x 2.4 x 3.1 cm nodular soft tissue density along the lesser curvature of the stomach. Patient has underlying hypertension and had underwent renal artery Doppler on 1/7/2020 and also found to have > 60% narrowing of right main renal artery. Left side was difficult to evaluate. According to patient her blood pressure is under well control at home. In July 2023, patient was admitted to [facility] with CVA and was diagnosed with severe left carotid artery stenosis. Patient was seen by vascular surgical team on 8/1/2023 and was deemed high-grade left internal carotid artery stenosis and would need TCAR near future.     Patient has quit smoking in the past.    Patient also have underlying hyperuricemia and currently also tolerating allopurinol . Patient was found to have hypercalcemia in the past and no longer taking chlorthalidone or cholecalciferol. Patient takes all the medications as prescribed and denies use of NSAIDs. REVIEW OF SYSTEMS:    Review of Systems   Constitutional: Negative for chills and fever. HENT: Negative for nosebleeds and sore throat. Eyes: Negative for photophobia and pain. Respiratory: Negative for choking and wheezing. Cardiovascular: Negative for chest pain and palpitations. Gastrointestinal: Negative for blood in stool. Genitourinary: Negative for hematuria. Musculoskeletal: Negative for neck stiffness. Skin: Negative for pallor. Neurological: Negative for seizures and syncope. Psychiatric/Behavioral: Negative for confusion and suicidal ideas. PAST MEDICAL HISTORY:  Past Medical History:   Diagnosis Date   • Arthritis    • Chronic kidney disease    • Endometriosis    • High cholesterol    • Hypertension    • Kidney disease, chronic, stage III (moderate, EGFR 30-59 ml/min) ([facility])    • Lumbar disc herniation    • Neuropathy    • Peripheral vascular disease ([facility])    • Pneumonia    • Shoulder injury     left   • Spinal stenosis    • Stroke ([address]) 2015    Memory loss       PAST SURGICAL HISTORY:  Past Surgical History:   Procedure Laterality Date   • CARDIAC CATHETERIZATION     • COLONOSCOPY     • FL RETROGRADE PYELOGRAM  4/6/2020   • FRACTURE SURGERY Right     ankle   • OVARIAN CYST SURGERY     • CT CYSTO BLADDER W/URETERAL CATHETERIZATION Bilateral 4/6/2020    Procedure: CYSTOSCOPY WITH RETROGRADE PYELOGRAM;  Surgeon: Rajni Luong MD;  [facility];  Service: Urology   • CT CYSTO W/INSERT URETERAL STENT Right 4/6/2020    Procedure: INSERTION STENT URETERAL;  Surgeon: Rajni Luong MD;  [facility];  Service: Urology   • CT CYSTO W/REMOVAL OF TUMORS SMALL N/A 4/6/2020    Procedure: TRANSURETHRAL RESECTION OF BLADDER TUMOR (TURBT);   Surgeon: Rajni Luong MD;  [facility];  Service: Urology   • ROTATOR CUFF REPAIR Left    • TONSILLECTOMY         SOCIAL HISTORY:  Social History     Substance and Sexual Activity   Alcohol Use Never     Social History     Substance and Sexual Activity   Drug Use Never     Social History     Tobacco Use   Smoking Status Former   • Packs/day: 2.00   • Years: 40.00   • Total pack years: 80.00   • Types: Cigarettes   • Start date: 1966   • Quit date: 7/27/2020   • Years since quitting: 3.0   Smokeless Tobacco Never       FAMILY HISTORY:  Family History   Problem Relation Age of Onset   • Hypertension Mother    • Heart disease Mother         Valvular   • Hyperlipidemia Mother    • Hypertension Father    • Heart defect Father         Cardiomegaly   • Stroke Sister         Cerebrovascular Accident   • Arthritis Brother    • Other Brother         Back Disorder       ALLERGY:  Allergies   Allergen Reactions   • Fenofibrate Other (See Comments)      blood in urine  hx  Kidney Failure   • Colesevelam Other (See Comments)      leg pains   • Colestipol Itching and Other (See Comments)      Swelling lower legs   • Ezetimibe GI Intolerance   • Statins Myalgia       MEDICATIONS:    Current Outpatient Medications:   •  Alcohol Swabs 70 % PADS, May substitute brand based on insurance coverage. Check glucose ACHS. , Disp: 200 each, Rfl: 0  •  Alirocumab 75 MG/ML SOAJ, Inject 75 mg under the skin every 14 (fourteen) days, Disp: 1 mL, Rfl: 0  •  allopurinol (ZYLOPRIM) 100 mg tablet, TAKE 1 TABLET BY MOUTH EVERY DAY, Disp: 90 tablet, Rfl: 2  •  amLODIPine (NORVASC) 10 mg tablet, Take 1 tablet (10 mg total) by mouth daily, Disp: 90 tablet, Rfl: 1  •  aspirin 81 mg chewable tablet, Chew 1 tablet (81 mg total) daily Do not start before July 30, 2023., Disp: 30 tablet, Rfl: 0  •  Blood Glucose Monitoring Suppl (OneTouch Verio Reflect) w/Device KIT, May substitute brand based on insurance coverage. Check glucose ACHS. , Disp: 1 kit, Rfl: 0  •  cinacalcet (SENSIPAR) 30 mg tablet, Take 1 tablet (30 mg total) by mouth every other day, Disp: 45 tablet, Rfl: 2  •  cloNIDine (CATAPRES-TTS-3) 0.3 mg/24 hr, PLACE 1 PATCH (0.3 MG TOTAL) ON THE SKIN ONCE A WEEK, Disp: 12 patch, Rfl: 1  •  glucose blood (OneTouch Verio) test strip, May substitute brand based on insurance coverage. Check glucose ACHS. , Disp: 200 each, Rfl: 0  •  labetalol (NORMODYNE) 300 mg tablet, Take 1 tablet (300 mg total) by mouth 2 (two) times a day, Disp: 180 tablet, Rfl: 1  •  losartan (COZAAR) 100 MG tablet, Take 1 tablet (100 mg total) by mouth daily, Disp: 90 tablet, Rfl: 1  •  Omega-3 Fatty Acids (fish oil) 1,000 mg, Take 1 capsule (1,000 mg total) by mouth daily Do not start before July 30, 2023., Disp: 30 capsule, Rfl: 0  •  OneTouch Delica Lancets 55U MISC, May substitute brand based on insurance coverage. Check glucose ACHS. , Disp: 200 each, Rfl: 0  •  pravastatin (PRAVACHOL) 80 mg tablet, Take 1 tablet (80 mg total) by mouth daily with dinner for 14 days, Disp: 90 tablet, Rfl: 1  •  ticagrelor (BRILINTA) 90 MG, Take 1 tablet (90 mg total) by mouth every 12 (twelve) hours, Disp: 60 tablet, Rfl: 1  •  zinc oxide 20 % ointment, Apply topically as needed for irritation, Disp: 56.7 g, Rfl: 0    PHYSICAL EXAM:  Vitals:    08/07/23 1352   BP: 110/66   BP Location: Left arm   Patient Position: Sitting   Cuff Size: Standard   Pulse: 71   Resp: 16   Temp: 97.5 °F (36.4 °C)   TempSrc: Temporal   SpO2: 99%   Weight: 72.6 kg (160 lb)   Height: 4' 10" (1.473 m)     Body mass index is 33.44 kg/m². Physical Exam  Constitutional:       General: She is not in acute distress. HENT:      Right Ear: External ear normal.   Eyes:      Conjunctiva/sclera:      Right eye: No hemorrhage. Neck:      Thyroid: No thyromegaly. Pulmonary:      Effort: No accessory muscle usage or respiratory distress. Abdominal:      General: There is no distension. Skin:     General: Skin is warm.       Coloration: Skin is not jaundiced. Psychiatric:         Behavior: Behavior is not combative.          LAB RESULTS:  Results for orders placed or performed in visit on 30/22/40   Basic metabolic panel   Result Value Ref Range    Sodium 136 135 - 147 mmol/L    Potassium 4.2 3.5 - 5.3 mmol/L    Chloride 103 96 - 108 mmol/L    CO2 23 21 - 32 mmol/L    ANION GAP 10 mmol/L    BUN 21 5 - 25 mg/dL    Creatinine 1.80 (H) 0.60 - 1.30 mg/dL    Glucose, Fasting 271 (H) 65 - 99 mg/dL    Calcium 10.5 (H) 8.4 - 10.2 mg/dL    eGFR 26 ml/min/1.73sq m   CBC and differential   Result Value Ref Range    WBC 6.04 4.31 - 10.16 Thousand/uL    RBC 4.66 3.81 - 5.12 Million/uL    Hemoglobin 13.9 11.5 - 15.4 g/dL    Hematocrit 40.4 34.8 - 46.1 %    MCV 87 82 - 98 fL    MCH 29.8 26.8 - 34.3 pg    MCHC 34.4 31.4 - 37.4 g/dL    RDW 13.1 11.6 - 15.1 %    MPV 10.5 8.9 - 12.7 fL    Platelets 141 305 - 541 Thousands/uL    nRBC 0 /100 WBCs    Neutrophils Relative 64 43 - 75 %    Immat GRANS % 1 0 - 2 %    Lymphocytes Relative 19 14 - 44 %    Monocytes Relative 10 4 - 12 %    Eosinophils Relative 5 0 - 6 %    Basophils Relative 1 0 - 1 %    Neutrophils Absolute 3.89 1.85 - 7.62 Thousands/µL    Immature Grans Absolute 0.06 0.00 - 0.20 Thousand/uL    Lymphocytes Absolute 1.12 0.60 - 4.47 Thousands/µL    Monocytes Absolute 0.62 0.17 - 1.22 Thousand/µL    Eosinophils Absolute 0.30 0.00 - 0.61 Thousand/µL    Basophils Absolute 0.05 0.00 - 0.10 Thousands/µL   Phosphorus   Result Value Ref Range    Phosphorus 3.3 2.3 - 4.1 mg/dL   PTH, intact   Result Value Ref Range    .9 (H) 12.0 - 88.0 pg/mL   Uric acid   Result Value Ref Range    Uric Acid 6.2 2.0 - 7.5 mg/dL   Vitamin D 25 hydroxy   Result Value Ref Range    Vit D, 25-Hydroxy 24.5 (L) 30.0 - 100.0 ng/mL   Protein electrophoresis, serum   Result Value Ref Range    A/G Ratio 1.42 1.10 - 1.80    Albumin Electrophoresis 58.6 48.0 - 70.0 %    Albumin CONC 4.16 3.20 - 5.10 g/dl    Alpha 1 4.6 1.8 - 7.0 %    ALPHA 1 CONC 0.33 0.15 - 0.47 g/dL    Alpha 2 10.7 5.9 - 14.9 %    ALPHA 2 CONC 0.76 0.42 - 1.04 g/dL    Beta-1 5.4 4.7 - 7.7 %    BETA 1 CONC 0.38 0.31 - 0.57 g/dL    Beta-2 6.2 3.1 - 7.9 %    BETA 2 CONC 0.44 0.20 - 0.58 g/dL    Gamma Globulin 14.5 6.9 - 22.3 %    GAMMA CONC 1.03 0.40 - 1.66 g/dL    Total Protein 7.1 6.4 - 8.2 g/dL    SPEP Interpretation See Comment      Renal artery Doppler done on 1/7/2020 showed > 60% narrowing/stenosis of right main renal artery. Left side was difficult to evaluate. ASSESSMENT and PLAN:  Talat Bailey was seen today for chronic kidney disease and follow-up. Diagnoses and all orders for this visit:    Stage 4 chronic kidney disease ([address])  -     CBC and differential; Future  -     Basic metabolic panel; Future  -     UA (URINE) with reflex to Scope; Future  -     Albumin / creatinine urine ratio; Future  -     Phosphorus; Future  -     Uric acid; Future  -     PTH, intact; Future  -     Vitamin D 25 hydroxy; Future  -     Discontinue: calcitriol (ROCALTROL) 0.25 mcg capsule; Take 1 capsule (0.25 mcg total) by mouth every other day  -     cinacalcet (SENSIPAR) 30 mg tablet; Take 1 tablet (30 mg total) by mouth every other day    Hypertensive kidney disease with stage 4 chronic kidney disease ([address])  -     Basic metabolic panel; Future  -     UA (URINE) with reflex to Scope; Future  -     Albumin / creatinine urine ratio; Future    Secondary hyperparathyroidism of renal origin [facility])  -     Basic metabolic panel; Future  -     Phosphorus; Future  -     PTH, intact; Future  -     Vitamin D 25 hydroxy; Future  -     Discontinue: calcitriol (ROCALTROL) 0.25 mcg capsule; Take 1 capsule (0.25 mcg total) by mouth every other day  -     cinacalcet (SENSIPAR) 30 mg tablet; Take 1 tablet (30 mg total) by mouth every other day    Hypercalcemia  -     Basic metabolic panel; Future  -     Phosphorus; Future  -     PTH, intact; Future  -     Vitamin D 25 hydroxy;  Future  -     Discontinue: calcitriol (ROCALTROL) 0.25 mcg capsule; Take 1 capsule (0.25 mcg total) by mouth every other day  -     cinacalcet (SENSIPAR) 30 mg tablet; Take 1 tablet (30 mg total) by mouth every other day    Abnormal serum protein electrophoresis  -     Ambulatory Referral to Hematology / Oncology; Future      1. CKD stage 4. Multifactorial and was suspected due to underlying hypertensive nephrosclerosis on top of atrophic left kidney and advanced age. Upon review of old medical records, new baseline creatinine seems to be fluctuating around 1.8 with current creatinine of 1.8 with a EGFR of 26    Dialysis been discussed in length with patient today. Patient told me today that she does not like to pursue dialysis even though it can be life-saving procedure. Patient's son was also present during office visit today. Patient also told me today that she is DNR and would like to be comfortable without doing dialysis if her renal function worsen in the future. Advance care planning was also discussed with patient and patient told me that she has living will and she does not want aggressive management in the future. 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    Patient was also diagnosed with bladder cancer earlier and defer further management to urologist.     Recent urine analysis showed dysuric picture but patient denies any symptomatic dysuria and would not consider use of antibiotic at this point. Management of hypertension as mentioned below . Plan to avoid NSAIDs and recheck renal function before next visit. 2. Hypertension in chronic kidney disease. Today's blood pressures under well control and plan to monitor hypertension with amlodipine 10 mg p.o. daily, 0.3 mg clonidine patch, labetalol 300 mg p.o. twice daily and losartan 100 mg p.o. daily today. Also encourage patient to follow low-salt diet.     Patient had underwent renal artery Doppler on 1/7/2020 and also found to have > 60% stenosis of right main renal artery. Would recommend medical management prior to considering renal artery stenting. 3. Proteinuria. Multifactorial, urine analysis showed 2+ proteinuria and pending urine microalbumin to creatinine ratio. Continue losartan 100 mg p.o. daily for time being and recheck proteinuria with the next visit    4. Hyperuricemia. Multifactorial, suspected due to underlying chronic kidney disease on top of gout. Currently patient is taking allopurinol 100 mg PO daily. Current uric acid level is 7.1 which is at goal and plan to continue allopurinol at current dose and recheck uric acid level with the next visit    5. Secondary hyperparathyroidism of renal origin + hypercalcemia. In the past due to hypercalcemia, chlorthalidone and cholecalciferol was discontinued. Patient's current PTH level is 111 with vitamin D level of 24 and calcium level of 10.5. Cholecalciferol and chlorthalidone was discontinued in the past due to hypercalcemia  Patient has calcitriol on the medication list but according to patient's son, she is not taking at this point. Discussed management of hypercalcemia in length with patient today which included proceeding to nuclear medicine parathyroid scan with SPECT but patient does not want to pursue imaging at this point. In current clinical setting, we will plan to start patient on Sensipar 30 mg p.o. every other day. Plan to check calcium and PTH level with the next visit. SPEP showed monoclonal band and pending UPEP and immunofixation. Discussed overall management in length with patient and son and they are agreeable with the referring patient to hematologist for further evaluation of abnormal SPEP. 6.  Preoperative renal clearance/renal optimization strategy prior to undergoing surgical procedure.     Patient was recently admitted to [facility] with CVA and now diagnosed with high-grade right internal carotid artery stenosis and seen by vascular surgeon on 8/1/2023 and would need to undergo TCAR near future. I have personally discussed the risks and benefits of the surgery from a renal standpoint with the patient in depth, and advised the patient about the risk of RICARDO and the course of RICARDO if it occurs with the small probability of the need for renal replacement therapy in the worse case scenario. Patient voiced understanding. • From a renal standpoint the patient is renally optimized for surgery with the following recommendations:  • Fluids to administer: Normal Saline 500 cc total over 2 hours   • Medication Recommendations:  • Minimize any IV contrast use (If IV contrast is used, please check BMP POD # 1)  • Hold NSAIDs for at least 10 days prior to surgery  • Plan to hold losartan 24 hours prior to surgical procedure  • Hemodynamic Recommendations:  • Ideally, target MAPs greater than 65 mmHg in the perioperative period, and minimize operative time with MAPs < 65 mmHg. • Avoid intraop hemodynamic instability to decrease risk for RICARDO occurrence. • Other Recommendations:  • Please consult the Nephrology team when the patient is admitted and Please check a BMP POD day # 1 and call if any questions or if Creatinine is rising or electrolyte abnormalities present    Returns to Nephrology office in 3 months with [place]. Future labs ordered. Portions of the record may have been created with voice recognition software. Occasional wrong word or "sound a like" substitutions may have occurred due to the inherent limitations of voice recognition software. Read the chart carefully and recognize, using context, where substitutions have occurred. If you have any questions, please contact the dictating provider.

## 2023-08-07 NOTE — TELEPHONE ENCOUNTER
Received nephrology recommendations. See 8/7/23 OV note from Dr. Jayce Alexander:    6. Preoperative renal clearance/renal optimization strategy prior to undergoing surgical procedure.     Patient was recently admitted to [facility] with CVA and now diagnosed with high-grade right internal carotid artery stenosis and seen by vascular surgeon on 8/1/2023 and would need to undergo TCAR near future.     I have personally discussed the risks and benefits of the surgery from a renal standpoint with the patient in depth, and advised the patient about the risk of RICARDO and the course of RICARDO if it occurs with the small probability of the need for renal replacement therapy in the worse case scenario. Patient voiced understanding.     • From a renal standpoint the patient is renally optimized for surgery with the following recommendations:  • Fluids to administer: Normal Saline 500 cc total over 2 hours   • Medication Recommendations:  • Minimize any IV contrast use (If IV contrast is used, please check BMP POD # 1)  • Hold NSAIDs for at least 10 days prior to surgery  • Plan to hold losartan 24 hours prior to surgical procedure  • Hemodynamic Recommendations:  • Ideally, target MAPs greater than 65 mmHg in the perioperative period, and minimize operative time with MAPs < 65 mmHg. • Avoid intraop hemodynamic instability to decrease risk for RICARDO occurrence.   • Other Recommendations:  • Please consult the Nephrology team when the patient is admitted and Please check a BMP POD day # 1 and call if any questions or if Creatinine is rising or electrolyte abnormalities present

## 2023-08-07 NOTE — LETTER
August 7, 2023     Cayetano Holly MD  [address]  [address] [place] [id number]    Patient: Kelly Ozuna   YOB: 1945   Date of Visit: 8/7/2023       Dear Dr. Mena Marrow: Thank you for referring Lisa Rashid to me for evaluation. Below are my notes for this consultation. If you have questions, please do not hesitate to call me. I look forward to following your patient along with you. Sincerely,        Jerod Arriaga MD        CC: No Recipients    Jerod Arriaga MD  8/7/2023  4:03 PM  Sign when Signing Visit  NEPHROLOGY OFFICE FOLLOW-UP  Kelly Ozuna 70 y.o. Female YOB: 1945 MRN: [id number]    Encounter: [id number] DATE: 8/7/2023    REASON FOR VISIT: Kelly Ozuna is a 70 y. o.female returns to Nephrology office for further management of chronic kidney disease. HPI:    This is 65-year-old female with past medical history significant for hypertension, hyperlipidemia, spinal stenosis, CVA, peripheral vascular disease, bladder cancer, returns to Nephrology office for further management of CKD stage 4. Patient was last seen in nephrology office on 4/19/2022. Upon review of old medical record from picture review, patient's new baseline creatinine seems to me fluctuating around 1.8. Patient told me during office visit today that she does not want to do dialysis even though it can be life-saving procedure in the future. Patient's son was also present at the time of consultation and history was also obtained from him and all the questions were answered . Patient was also found to have bladder cancer and was seen by urologist-Dr. Maxime Benavidez and had underwent cystoscopy in the past.    Patient is currently been followed by GI since she was found to have 2.9 x 2.4 x 3.1 cm nodular soft tissue density along the lesser curvature of the stomach.       Patient has underlying hypertension and had underwent renal artery Doppler on 1/7/2020 and also found to have > 60% narrowing of right main renal artery. Left side was difficult to evaluate. According to patient her blood pressure is under well control at home. In July 2023, patient was admitted to [facility] with CVA and was diagnosed with severe left carotid artery stenosis. Patient was seen by vascular surgical team on 8/1/2023 and was deemed high-grade left internal carotid artery stenosis and would need TCAR near future. Patient has quit smoking in the past.    Patient also have underlying hyperuricemia and currently also tolerating allopurinol . Patient was found to have hypercalcemia in the past and no longer taking chlorthalidone or cholecalciferol. Patient takes all the medications as prescribed and denies use of NSAIDs. REVIEW OF SYSTEMS:    Review of Systems   Constitutional: Negative for chills and fever. HENT: Negative for nosebleeds and sore throat. Eyes: Negative for photophobia and pain. Respiratory: Negative for choking and wheezing. Cardiovascular: Negative for chest pain and palpitations. Gastrointestinal: Negative for blood in stool. Genitourinary: Negative for hematuria. Musculoskeletal: Negative for neck stiffness. Skin: Negative for pallor. Neurological: Negative for seizures and syncope. Psychiatric/Behavioral: Negative for confusion and suicidal ideas.          PAST MEDICAL HISTORY:  Past Medical History:   Diagnosis Date   • Arthritis    • Chronic kidney disease    • Endometriosis    • High cholesterol    • Hypertension    • Kidney disease, chronic, stage III (moderate, EGFR 30-59 ml/min) ([facility])    • Lumbar disc herniation    • Neuropathy    • Peripheral vascular disease ([address])    • Pneumonia    • Shoulder injury     left   • Spinal stenosis    • Stroke ([address]) 2015    Memory loss       PAST SURGICAL HISTORY:  Past Surgical History:   Procedure Laterality Date   • CARDIAC CATHETERIZATION     • COLONOSCOPY     • FL RETROGRADE PYELOGRAM  4/6/2020 • FRACTURE SURGERY Right     ankle   • OVARIAN CYST SURGERY     • RI CYSTO BLADDER W/URETERAL CATHETERIZATION Bilateral 4/6/2020    Procedure: CYSTOSCOPY WITH RETROGRADE PYELOGRAM;  Surgeon: Macey Thomas MD;  [facility];  Service: Urology   • RI CYSTO W/INSERT URETERAL STENT Right 4/6/2020    Procedure: INSERTION STENT URETERAL;  Surgeon: Macey Thomas MD;  [facility];  Service: Urology   • RI CYSTO W/REMOVAL OF TUMORS SMALL N/A 4/6/2020    Procedure: TRANSURETHRAL RESECTION OF BLADDER TUMOR (TURBT); Surgeon: Macey Thomas MD;  [facility];  Service: Urology   • ROTATOR CUFF REPAIR Left    • TONSILLECTOMY         SOCIAL HISTORY:  Social History     Substance and Sexual Activity   Alcohol Use Never     Social History     Substance and Sexual Activity   Drug Use Never     Social History     Tobacco Use   Smoking Status Former   • Packs/day: 2.00   • Years: 40.00   • Total pack years: 80.00   • Types: Cigarettes   • Start date: 1966   • Quit date: 7/27/2020   • Years since quitting: 3.0   Smokeless Tobacco Never       FAMILY HISTORY:  Family History   Problem Relation Age of Onset   • Hypertension Mother    • Heart disease Mother         Valvular   • Hyperlipidemia Mother    • Hypertension Father    • Heart defect Father         Cardiomegaly   • Stroke Sister         Cerebrovascular Accident   • Arthritis Brother    • Other Brother         Back Disorder       ALLERGY:  Allergies   Allergen Reactions   • Fenofibrate Other (See Comments)      blood in urine  hx  Kidney Failure   • Colesevelam Other (See Comments)      leg pains   • Colestipol Itching and Other (See Comments)      Swelling lower legs   • Ezetimibe GI Intolerance   • Statins Myalgia       MEDICATIONS:    Current Outpatient Medications:   •  Alcohol Swabs 70 % PADS, May substitute brand based on insurance coverage. Check glucose ACHS. , Disp: 200 each, Rfl: 0  •  Alirocumab 75 MG/ML SOAJ, Inject 75 mg under the skin every 14 (fourteen) days, Disp: 1 mL, Rfl: 0  •  allopurinol (ZYLOPRIM) 100 mg tablet, TAKE 1 TABLET BY MOUTH EVERY DAY, Disp: 90 tablet, Rfl: 2  •  amLODIPine (NORVASC) 10 mg tablet, Take 1 tablet (10 mg total) by mouth daily, Disp: 90 tablet, Rfl: 1  •  aspirin 81 mg chewable tablet, Chew 1 tablet (81 mg total) daily Do not start before July 30, 2023., Disp: 30 tablet, Rfl: 0  •  Blood Glucose Monitoring Suppl (OneTouch Verio Reflect) w/Device KIT, May substitute brand based on insurance coverage. Check glucose ACHS. , Disp: 1 kit, Rfl: 0  •  cinacalcet (SENSIPAR) 30 mg tablet, Take 1 tablet (30 mg total) by mouth every other day, Disp: 45 tablet, Rfl: 2  •  cloNIDine (CATAPRES-TTS-3) 0.3 mg/24 hr, PLACE 1 PATCH (0.3 MG TOTAL) ON THE SKIN ONCE A WEEK, Disp: 12 patch, Rfl: 1  •  glucose blood (OneTouch Verio) test strip, May substitute brand based on insurance coverage. Check glucose ACHS. , Disp: 200 each, Rfl: 0  •  labetalol (NORMODYNE) 300 mg tablet, Take 1 tablet (300 mg total) by mouth 2 (two) times a day, Disp: 180 tablet, Rfl: 1  •  losartan (COZAAR) 100 MG tablet, Take 1 tablet (100 mg total) by mouth daily, Disp: 90 tablet, Rfl: 1  •  Omega-3 Fatty Acids (fish oil) 1,000 mg, Take 1 capsule (1,000 mg total) by mouth daily Do not start before July 30, 2023., Disp: 30 capsule, Rfl: 0  •  OneTouch Delica Lancets 93D MISC, May substitute brand based on insurance coverage. Check glucose ACHS. , Disp: 200 each, Rfl: 0  •  pravastatin (PRAVACHOL) 80 mg tablet, Take 1 tablet (80 mg total) by mouth daily with dinner for 14 days, Disp: 90 tablet, Rfl: 1  •  ticagrelor (BRILINTA) 90 MG, Take 1 tablet (90 mg total) by mouth every 12 (twelve) hours, Disp: 60 tablet, Rfl: 1  •  zinc oxide 20 % ointment, Apply topically as needed for irritation, Disp: 56.7 g, Rfl: 0    PHYSICAL EXAM:  Vitals:    08/07/23 1352   BP: 110/66   BP Location: Left arm   Patient Position: Sitting   Cuff Size: Standard   Pulse: 71   Resp: 16 Temp: 97.5 °F (36.4 °C)   TempSrc: Temporal   SpO2: 99%   Weight: 72.6 kg (160 lb)   Height: 4' 10" (1.473 m)     Body mass index is 33.44 kg/m². Physical Exam  Constitutional:       General: She is not in acute distress. HENT:      Right Ear: External ear normal.   Eyes:      Conjunctiva/sclera:      Right eye: No hemorrhage. Neck:      Thyroid: No thyromegaly. Pulmonary:      Effort: No accessory muscle usage or respiratory distress. Abdominal:      General: There is no distension. Skin:     General: Skin is warm. Coloration: Skin is not jaundiced. Psychiatric:         Behavior: Behavior is not combative.          LAB RESULTS:  Results for orders placed or performed in visit on 85/80/31   Basic metabolic panel   Result Value Ref Range    Sodium 136 135 - 147 mmol/L    Potassium 4.2 3.5 - 5.3 mmol/L    Chloride 103 96 - 108 mmol/L    CO2 23 21 - 32 mmol/L    ANION GAP 10 mmol/L    BUN 21 5 - 25 mg/dL    Creatinine 1.80 (H) 0.60 - 1.30 mg/dL    Glucose, Fasting 271 (H) 65 - 99 mg/dL    Calcium 10.5 (H) 8.4 - 10.2 mg/dL    eGFR 26 ml/min/1.73sq m   CBC and differential   Result Value Ref Range    WBC 6.04 4.31 - 10.16 Thousand/uL    RBC 4.66 3.81 - 5.12 Million/uL    Hemoglobin 13.9 11.5 - 15.4 g/dL    Hematocrit 40.4 34.8 - 46.1 %    MCV 87 82 - 98 fL    MCH 29.8 26.8 - 34.3 pg    MCHC 34.4 31.4 - 37.4 g/dL    RDW 13.1 11.6 - 15.1 %    MPV 10.5 8.9 - 12.7 fL    Platelets 615 144 - 778 Thousands/uL    nRBC 0 /100 WBCs    Neutrophils Relative 64 43 - 75 %    Immat GRANS % 1 0 - 2 %    Lymphocytes Relative 19 14 - 44 %    Monocytes Relative 10 4 - 12 %    Eosinophils Relative 5 0 - 6 %    Basophils Relative 1 0 - 1 %    Neutrophils Absolute 3.89 1.85 - 7.62 Thousands/µL    Immature Grans Absolute 0.06 0.00 - 0.20 Thousand/uL    Lymphocytes Absolute 1.12 0.60 - 4.47 Thousands/µL    Monocytes Absolute 0.62 0.17 - 1.22 Thousand/µL    Eosinophils Absolute 0.30 0.00 - 0.61 Thousand/µL    Basophils Absolute 0.05 0.00 - 0.10 Thousands/µL   Phosphorus   Result Value Ref Range    Phosphorus 3.3 2.3 - 4.1 mg/dL   PTH, intact   Result Value Ref Range    .9 (H) 12.0 - 88.0 pg/mL   Uric acid   Result Value Ref Range    Uric Acid 6.2 2.0 - 7.5 mg/dL   Vitamin D 25 hydroxy   Result Value Ref Range    Vit D, 25-Hydroxy 24.5 (L) 30.0 - 100.0 ng/mL   Protein electrophoresis, serum   Result Value Ref Range    A/G Ratio 1.42 1.10 - 1.80    Albumin Electrophoresis 58.6 48.0 - 70.0 %    Albumin CONC 4.16 3.20 - 5.10 g/dl    Alpha 1 4.6 1.8 - 7.0 %    ALPHA 1 CONC 0.33 0.15 - 0.47 g/dL    Alpha 2 10.7 5.9 - 14.9 %    ALPHA 2 CONC 0.76 0.42 - 1.04 g/dL    Beta-1 5.4 4.7 - 7.7 %    BETA 1 CONC 0.38 0.31 - 0.57 g/dL    Beta-2 6.2 3.1 - 7.9 %    BETA 2 CONC 0.44 0.20 - 0.58 g/dL    Gamma Globulin 14.5 6.9 - 22.3 %    GAMMA CONC 1.03 0.40 - 1.66 g/dL    Total Protein 7.1 6.4 - 8.2 g/dL    SPEP Interpretation See Comment      Renal artery Doppler done on 1/7/2020 showed > 60% narrowing/stenosis of right main renal artery. Left side was difficult to evaluate. ASSESSMENT and PLAN:  Vani Reyes was seen today for chronic kidney disease and follow-up. Diagnoses and all orders for this visit:    Stage 4 chronic kidney disease ([address])  -     CBC and differential; Future  -     Basic metabolic panel; Future  -     UA (URINE) with reflex to Scope; Future  -     Albumin / creatinine urine ratio; Future  -     Phosphorus; Future  -     Uric acid; Future  -     PTH, intact; Future  -     Vitamin D 25 hydroxy; Future  -     Discontinue: calcitriol (ROCALTROL) 0.25 mcg capsule; Take 1 capsule (0.25 mcg total) by mouth every other day  -     cinacalcet (SENSIPAR) 30 mg tablet; Take 1 tablet (30 mg total) by mouth every other day    Hypertensive kidney disease with stage 4 chronic kidney disease ([address])  -     Basic metabolic panel; Future  -     UA (URINE) with reflex to Scope;  Future  -     Albumin / creatinine urine ratio; Future    Secondary hyperparathyroidism of renal origin [facility])  -     Basic metabolic panel; Future  -     Phosphorus; Future  -     PTH, intact; Future  -     Vitamin D 25 hydroxy; Future  -     Discontinue: calcitriol (ROCALTROL) 0.25 mcg capsule; Take 1 capsule (0.25 mcg total) by mouth every other day  -     cinacalcet (SENSIPAR) 30 mg tablet; Take 1 tablet (30 mg total) by mouth every other day    Hypercalcemia  -     Basic metabolic panel; Future  -     Phosphorus; Future  -     PTH, intact; Future  -     Vitamin D 25 hydroxy; Future  -     Discontinue: calcitriol (ROCALTROL) 0.25 mcg capsule; Take 1 capsule (0.25 mcg total) by mouth every other day  -     cinacalcet (SENSIPAR) 30 mg tablet; Take 1 tablet (30 mg total) by mouth every other day    Abnormal serum protein electrophoresis  -     Ambulatory Referral to Hematology / Oncology; Future      1. CKD stage 4. Multifactorial and was suspected due to underlying hypertensive nephrosclerosis on top of atrophic left kidney and advanced age. Upon review of old medical records, new baseline creatinine seems to be fluctuating around 1.8 with current creatinine of 1.8 with a EGFR of 26    Dialysis been discussed in length with patient today. Patient told me today that she does not like to pursue dialysis even though it can be life-saving procedure. Patient's son was also present during office visit today. Patient also told me today that she is DNR and would like to be comfortable without doing dialysis if her renal function worsen in the future. Advance care planning was also discussed with patient and patient told me that she has living will and she does not want aggressive management in the future.     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    Patient was also diagnosed with bladder cancer earlier and defer further management to urologist.     Recent urine analysis showed dysuric picture but patient denies any symptomatic dysuria and would not consider use of antibiotic at this point. Management of hypertension as mentioned below . Plan to avoid NSAIDs and recheck renal function before next visit. 2. Hypertension in chronic kidney disease. Today's blood pressures under well control and plan to monitor hypertension with amlodipine 10 mg p.o. daily, 0.3 mg clonidine patch, labetalol 300 mg p.o. twice daily and losartan 100 mg p.o. daily today. Also encourage patient to follow low-salt diet. Patient had underwent renal artery Doppler on 1/7/2020 and also found to have > 60% stenosis of right main renal artery. Would recommend medical management prior to considering renal artery stenting. 3. Proteinuria. Multifactorial, urine analysis showed 2+ proteinuria and pending urine microalbumin to creatinine ratio. Continue losartan 100 mg p.o. daily for time being and recheck proteinuria with the next visit    4. Hyperuricemia. Multifactorial, suspected due to underlying chronic kidney disease on top of gout. Currently patient is taking allopurinol 100 mg PO daily. Current uric acid level is 7.1 which is at goal and plan to continue allopurinol at current dose and recheck uric acid level with the next visit    5. Secondary hyperparathyroidism of renal origin + hypercalcemia. In the past due to hypercalcemia, chlorthalidone and cholecalciferol was discontinued. Patient's current PTH level is 111 with vitamin D level of 24 and calcium level of 10.5. Cholecalciferol and chlorthalidone was discontinued in the past due to hypercalcemia  Patient has calcitriol on the medication list but according to patient's son, she is not taking at this point. Discussed management of hypercalcemia in length with patient today which included proceeding to nuclear medicine parathyroid scan with SPECT but patient does not want to pursue imaging at this point.   In current clinical setting, we will plan to start patient on Sensipar 30 mg p.o. every other day. Plan to check calcium and PTH level with the next visit. SPEP showed monoclonal band and pending UPEP and immunofixation. Discussed overall management in length with patient and son and they are agreeable with the referring patient to hematologist for further evaluation of abnormal SPEP. 6.  Preoperative renal clearance/renal optimization strategy prior to undergoing surgical procedure. Patient was recently admitted to [facility] with CVA and now diagnosed with high-grade right internal carotid artery stenosis and seen by vascular surgeon on 8/1/2023 and would need to undergo TCAR near future. I have personally discussed the risks and benefits of the surgery from a renal standpoint with the patient in depth, and advised the patient about the risk of RICARDO and the course of RICARDO if it occurs with the small probability of the need for renal replacement therapy in the worse case scenario. Patient voiced understanding. From a renal standpoint the patient is renally optimized for surgery with the following recommendations:  Fluids to administer: Normal Saline 500 cc total over 2 hours   Medication Recommendations:  Minimize any IV contrast use (If IV contrast is used, please check BMP POD # 1)  Hold NSAIDs for at least 10 days prior to surgery  Plan to hold losartan 24 hours prior to surgical procedure  Hemodynamic Recommendations:  Ideally, target MAPs greater than 65 mmHg in the perioperative period, and minimize operative time with MAPs < 65 mmHg. Avoid intraop hemodynamic instability to decrease risk for RICARDO occurrence. Other Recommendations:  Please consult the Nephrology team when the patient is admitted and Please check a BMP POD day # 1 and call if any questions or if Creatinine is rising or electrolyte abnormalities present    Returns to Nephrology office in 3 months with [place].   Future labs ordered. Portions of the record may have been created with voice recognition software. Occasional wrong word or "sound a like" substitutions may have occurred due to the inherent limitations of voice recognition software. Read the chart carefully and recognize, using context, where substitutions have occurred. If you have any questions, please contact the dictating provider.

## 2023-08-08 ENCOUNTER — TELEPHONE (OUTPATIENT)
Dept: CARDIOLOGY CLINIC | Facility: CLINIC | Age: 78
End: 2023-08-08

## 2023-08-08 LAB
ALBUMIN UR ELPH-MCNC: 81.5 %
ALPHA1 GLOB MFR UR ELPH: 2.7 %
ALPHA2 GLOB MFR UR ELPH: 3 %
B-GLOBULIN MFR UR ELPH: 5.7 %
GAMMA GLOB MFR UR ELPH: 7.1 %
INTERPRETATION UR IFE-IMP: NORMAL
PROT PATTERN UR ELPH-IMP: NORMAL
PROT UR-MCNC: 171 MG/DL

## 2023-08-08 PROCEDURE — 84166 PROTEIN E-PHORESIS/URINE/CSF: CPT | Performed by: STUDENT IN AN ORGANIZED HEALTH CARE EDUCATION/TRAINING PROGRAM

## 2023-08-08 PROCEDURE — 86335 IMMUNFIX E-PHORSIS/URINE/CSF: CPT | Performed by: STUDENT IN AN ORGANIZED HEALTH CARE EDUCATION/TRAINING PROGRAM

## 2023-08-08 NOTE — TELEPHONE ENCOUNTER
Pt is pamela on 8/17/23 to see Dr Yulisa Medel  Pt's testing is pamela on 8/17/23  Pt is having sx but no date as of yet  When can pt f/up w/ Dr Yulisa Medel for the results & for clearance?

## 2023-08-08 NOTE — TELEPHONE ENCOUNTER
Called patient and [facility] central scheduling   number so she can get her appt for echo and stress test

## 2023-08-09 ENCOUNTER — TELEPHONE (OUTPATIENT)
Dept: INTERNAL MEDICINE CLINIC | Facility: CLINIC | Age: 78
End: 2023-08-09

## 2023-08-09 ENCOUNTER — OFFICE VISIT (OUTPATIENT)
Dept: INTERNAL MEDICINE CLINIC | Facility: CLINIC | Age: 78
End: 2023-08-09
Payer: COMMERCIAL

## 2023-08-09 VITALS
OXYGEN SATURATION: 96 % | BODY MASS INDEX: 33.58 KG/M2 | HEART RATE: 79 BPM | DIASTOLIC BLOOD PRESSURE: 78 MMHG | SYSTOLIC BLOOD PRESSURE: 118 MMHG | WEIGHT: 160 LBS | HEIGHT: 58 IN

## 2023-08-09 DIAGNOSIS — G45.0 VERTEBROBASILAR ARTERY SYNDROME: ICD-10-CM

## 2023-08-09 DIAGNOSIS — E11.21 TYPE 2 DIABETES MELLITUS WITH DIABETIC NEPHROPATHY, WITHOUT LONG-TERM CURRENT USE OF INSULIN (HCC): ICD-10-CM

## 2023-08-09 DIAGNOSIS — E66.01 OBESITY, MORBID (HCC): ICD-10-CM

## 2023-08-09 DIAGNOSIS — Z86.73 HX-TIA (TRANSIENT ISCHEMIC ATTACK): ICD-10-CM

## 2023-08-09 DIAGNOSIS — C49.A0 GASTROINTESTINAL STROMAL TUMOR (GIST) (HCC): Primary | ICD-10-CM

## 2023-08-09 DIAGNOSIS — I63.9 CEREBROVASCULAR ACCIDENT (CVA), UNSPECIFIED MECHANISM (HCC): ICD-10-CM

## 2023-08-09 DIAGNOSIS — C67.8 MALIGNANT NEOPLASM OF OVERLAPPING SITES OF BLADDER (HCC): ICD-10-CM

## 2023-08-09 DIAGNOSIS — I10 PRIMARY HYPERTENSION: ICD-10-CM

## 2023-08-09 DIAGNOSIS — R47.01 APHASIA: ICD-10-CM

## 2023-08-09 DIAGNOSIS — I74.09 AORTOILIAC OCCLUSIVE DISEASE (HCC): ICD-10-CM

## 2023-08-09 PROBLEM — E83.52 HYPERCALCEMIA: Status: RESOLVED | Noted: 2023-08-07 | Resolved: 2023-08-09

## 2023-08-09 PROBLEM — M79.605 PAIN OF LEFT LOWER EXTREMITY: Status: RESOLVED | Noted: 2019-01-22 | Resolved: 2023-08-09

## 2023-08-09 PROBLEM — I12.9 HYPERTENSIVE KIDNEY DISEASE WITH STAGE 3 CHRONIC KIDNEY DISEASE (HCC): Status: RESOLVED | Noted: 2020-02-25 | Resolved: 2023-08-09

## 2023-08-09 PROBLEM — L28.0 NEURODERMATITIS: Status: RESOLVED | Noted: 2017-12-04 | Resolved: 2023-08-09

## 2023-08-09 PROBLEM — E66.9 OBESITY WITH BODY MASS INDEX 30 OR GREATER: Status: RESOLVED | Noted: 2017-12-04 | Resolved: 2023-08-09

## 2023-08-09 PROBLEM — N63.0 BREAST MASS: Status: RESOLVED | Noted: 2017-12-04 | Resolved: 2023-08-09

## 2023-08-09 PROBLEM — N18.30 HYPERTENSIVE KIDNEY DISEASE WITH STAGE 3 CHRONIC KIDNEY DISEASE (HCC): Status: RESOLVED | Noted: 2020-02-25 | Resolved: 2023-08-09

## 2023-08-09 PROBLEM — M48.00 SPINAL STENOSIS: Status: RESOLVED | Noted: 2018-09-19 | Resolved: 2023-08-09

## 2023-08-09 PROBLEM — N18.30 STAGE 3 CHRONIC KIDNEY DISEASE (HCC): Status: RESOLVED | Noted: 2017-12-04 | Resolved: 2023-08-09

## 2023-08-09 PROBLEM — R09.89 DECREASED PULSES IN FEET: Status: RESOLVED | Noted: 2019-10-22 | Resolved: 2023-08-09

## 2023-08-09 PROCEDURE — 99496 TRANSJ CARE MGMT HIGH F2F 7D: CPT | Performed by: INTERNAL MEDICINE

## 2023-08-09 RX ORDER — GLIPIZIDE 5 MG/1
5 TABLET ORAL
Qty: 30 TABLET | Refills: 5 | Status: SHIPPED | OUTPATIENT
Start: 2023-08-09

## 2023-08-09 NOTE — TELEPHONE ENCOUNTER
Jesica Leblanc called to say they were not able to go in today. .. Couldn't reach pt    occupational therapy.      Hoping to do next week

## 2023-08-09 NOTE — PROGRESS NOTES
Assessment & Plan     1. Gastrointestinal stromal tumor (GIST) ([address])    2. Cerebrovascular accident (CVA), unspecified mechanism ([address])    3. Primary hypertension    4. Vertebrobasilar artery syndrome    5. Aortoiliac occlusive disease ([address])    6. Malignant neoplasm of overlapping sites of bladder ([address])    7. Hx-TIA (transient ischemic attack)    8. Type 2 diabetes mellitus with diabetic nephropathy, without long-term current use of insulin (HCC)  -     glipiZIDE (GLUCOTROL) 5 mg tablet; Take 1 tablet (5 mg total) by mouth daily with breakfast  -     Ambulatory Referral to Endocrinology; Future    9. Obesity, morbid ([address])    10. Aphasia      BMI Counseling: There is no height or weight on file to calculate BMI. The BMI is above normal. Nutrition recommendations include decreasing portion sizes and encouraging healthy choices of fruits and vegetables. Exercise recommendations include moderate physical activity 150 minutes/week. Rationale for BMI follow-up plan is due to patient being overweight or obese. Subjective     Transitional Care Management Review:   Myriam Winter is a 70 y.o. female here for TCM follow up. During the TCM phone call patient stated:  TCM Call     Date and time call was made  7/31/2023  7:41 AM    Hospital care reviewed  Records reviewed    Patient was hospitialized at  [facility]    Date of Admission  07/27/23    Date of discharge  07/29/23    Diagnosis  CVA    Disposition  Home    Were the patients medications reviewed and updated  Yes    Current Symptoms  None      TCM Call     Post hospital issues  None    Should patient be enrolled in anticoag monitoring? No    Scheduled for follow up?   Yes    Did you obtain your prescribed medications  Yes    Do you need help managing your prescriptions or medications  No    Is transportation to your appointment needed  No    I have advised the patient to call PCP with any new or worsening symptoms  Doc Flavors, Practice Coordinator    Living Arrangements  Family members    Support System  None    Are you recieving any outpatient services  No    Are you recieving home care services  No    Are you using any community resources  No    Current waiver services  No    Have you fallen in the last 12 months  No    Interperter language line needed  No    Counseling  Patient        Evlyn Dakins is here for [facility] visit; we discussed recent hospitalization, reviewed dc summary and reviewed dc instructions;    Briefly, Evlyn Dakins presented to the hospital with dysarthria, weakness and reduced short term memory for approx 5 days before coming into the hospital.  Sheridan Martínez completed on admission was concerning for a CVA and an MRI did reveal acute to subacute CVA to occipital/parietal/frontal lobes;  US of her carotid arteries was also concerning for worsening disease; vascular and neurology consulted; now on pravastatin; planning for TCAR in approx 4 weeks but will require echo and stress test from cardiology first; she did refused rehab placement;    Patient is now willing to go on medication therapy for her diabetes. She agreed to start glipizide. Refusing insulin. Will also like her to see endocrine. She is tolerating statin okay. She has difficulty with her aphasia. He is frustrated with this. She has her son here who is helping her. She has all her follow-up scheduled. Also will be seeing hematology for her GIST dx. Review of Systems   Constitutional: Negative for chills and fever. HENT: Negative for ear pain and sore throat. Eyes: Positive for visual disturbance. Negative for pain. Respiratory: Negative for cough and shortness of breath. Cardiovascular: Negative for chest pain and palpitations. Gastrointestinal: Negative for abdominal pain and vomiting. Genitourinary: Negative for dysuria and hematuria. Musculoskeletal: Positive for arthralgias, back pain and gait problem. Skin: Negative for color change and rash. Neurological: Negative for seizures and syncope. All other systems reviewed and are negative. Objective     /78 (BP Location: Left arm, Patient Position: Sitting, Cuff Size: Adult)   Pulse 79   Ht 4' 10" (1.473 m)   Wt 72.6 kg (160 lb)   LMP  (LMP Unknown)   SpO2 96%   BMI 33.44 kg/m²      Physical Exam  Vitals and nursing note reviewed. Constitutional:       General: She is not in acute distress. Appearance: She is well-developed. HENT:      Head: Normocephalic and atraumatic. Eyes:      Conjunctiva/sclera: Conjunctivae normal.   Cardiovascular:      Rate and Rhythm: Normal rate and regular rhythm. Heart sounds: No murmur heard. Pulmonary:      Effort: Pulmonary effort is normal. No respiratory distress. Breath sounds: Normal breath sounds. Abdominal:      Palpations: Abdomen is soft. Tenderness: There is no abdominal tenderness. Musculoskeletal:         General: No swelling. Cervical back: Neck supple. Skin:     General: Skin is warm and dry. Capillary Refill: Capillary refill takes less than 2 seconds. Neurological:      Mental Status: She is alert.    Psychiatric:         Mood and Affect: Mood normal.       Medications have been reviewed by provider in current encounter    Loy Manrique MD

## 2023-08-09 NOTE — TELEPHONE ENCOUNTER
Please reschedule this patient's office appointment to August 24.    That will help to have the results of her cardiac testing available (scheduled for August 17th)

## 2023-08-15 ENCOUNTER — HOSPITAL ENCOUNTER (INPATIENT)
Facility: HOSPITAL | Age: 78
LOS: 3 days | DRG: 065 | End: 2023-08-19
Attending: EMERGENCY MEDICINE | Admitting: INTERNAL MEDICINE
Payer: COMMERCIAL

## 2023-08-15 ENCOUNTER — APPOINTMENT (EMERGENCY)
Dept: CT IMAGING | Facility: HOSPITAL | Age: 78
DRG: 065 | End: 2023-08-15
Payer: COMMERCIAL

## 2023-08-15 ENCOUNTER — TELEPHONE (OUTPATIENT)
Dept: HEMATOLOGY ONCOLOGY | Facility: CLINIC | Age: 78
End: 2023-08-15

## 2023-08-15 ENCOUNTER — TELEPHONE (OUTPATIENT)
Dept: VASCULAR SURGERY | Facility: CLINIC | Age: 78
End: 2023-08-15

## 2023-08-15 DIAGNOSIS — E78.5 HYPERLIPIDEMIA ASSOCIATED WITH TYPE 2 DIABETES MELLITUS (HCC): ICD-10-CM

## 2023-08-15 DIAGNOSIS — R47.01 APHASIA: Primary | ICD-10-CM

## 2023-08-15 DIAGNOSIS — E11.51 TYPE 2 DIABETES MELLITUS WITH DIABETIC PERIPHERAL ANGIOPATHY WITHOUT GANGRENE, WITHOUT LONG-TERM CURRENT USE OF INSULIN (HCC): ICD-10-CM

## 2023-08-15 DIAGNOSIS — R29.90 STROKE-LIKE SYMPTOMS: ICD-10-CM

## 2023-08-15 DIAGNOSIS — I65.29 CAROTID STENOSIS: ICD-10-CM

## 2023-08-15 DIAGNOSIS — I63.9 CVA (CEREBRAL VASCULAR ACCIDENT) (HCC): ICD-10-CM

## 2023-08-15 DIAGNOSIS — F33.9 DEPRESSION, RECURRENT (HCC): ICD-10-CM

## 2023-08-15 DIAGNOSIS — E11.69 HYPERLIPIDEMIA ASSOCIATED WITH TYPE 2 DIABETES MELLITUS (HCC): ICD-10-CM

## 2023-08-15 DIAGNOSIS — R47.1 DYSARTHRIA: ICD-10-CM

## 2023-08-15 LAB
2HR DELTA HS TROPONIN: 0 NG/L
4HR DELTA HS TROPONIN: -1 NG/L
ALBUMIN SERPL BCP-MCNC: 4.3 G/DL (ref 3.5–5)
ALP SERPL-CCNC: 87 U/L (ref 34–104)
ALT SERPL W P-5'-P-CCNC: 15 U/L (ref 7–52)
ANION GAP SERPL CALCULATED.3IONS-SCNC: 11 MMOL/L
AST SERPL W P-5'-P-CCNC: 14 U/L (ref 13–39)
BASOPHILS # BLD AUTO: 0.07 THOUSANDS/ÂΜL (ref 0–0.1)
BASOPHILS NFR BLD AUTO: 1 % (ref 0–1)
BILIRUB SERPL-MCNC: 0.71 MG/DL (ref 0.2–1)
BUN SERPL-MCNC: 26 MG/DL (ref 5–25)
CALCIUM SERPL-MCNC: 9.8 MG/DL (ref 8.4–10.2)
CARDIAC TROPONIN I PNL SERPL HS: 4 NG/L
CARDIAC TROPONIN I PNL SERPL HS: 5 NG/L
CARDIAC TROPONIN I PNL SERPL HS: 5 NG/L
CHLORIDE SERPL-SCNC: 106 MMOL/L (ref 96–108)
CO2 SERPL-SCNC: 21 MMOL/L (ref 21–32)
CREAT SERPL-MCNC: 1.89 MG/DL (ref 0.6–1.3)
EOSINOPHIL # BLD AUTO: 0.32 THOUSAND/ÂΜL (ref 0–0.61)
EOSINOPHIL NFR BLD AUTO: 4 % (ref 0–6)
ERYTHROCYTE [DISTWIDTH] IN BLOOD BY AUTOMATED COUNT: 14.1 % (ref 11.6–15.1)
GFR SERPL CREATININE-BSD FRML MDRD: 25 ML/MIN/1.73SQ M
GLUCOSE SERPL-MCNC: 138 MG/DL (ref 65–140)
GLUCOSE SERPL-MCNC: 230 MG/DL (ref 65–140)
GLUCOSE SERPL-MCNC: 263 MG/DL (ref 65–140)
GLUCOSE SERPL-MCNC: 267 MG/DL (ref 65–140)
HCT VFR BLD AUTO: 40.8 % (ref 34.8–46.1)
HGB BLD-MCNC: 14.4 G/DL (ref 11.5–15.4)
IMM GRANULOCYTES # BLD AUTO: 0.08 THOUSAND/UL (ref 0–0.2)
IMM GRANULOCYTES NFR BLD AUTO: 1 % (ref 0–2)
LYMPHOCYTES # BLD AUTO: 1.33 THOUSANDS/ÂΜL (ref 0.6–4.47)
LYMPHOCYTES NFR BLD AUTO: 15 % (ref 14–44)
MCH RBC QN AUTO: 30.9 PG (ref 26.8–34.3)
MCHC RBC AUTO-ENTMCNC: 35.3 G/DL (ref 31.4–37.4)
MCV RBC AUTO: 88 FL (ref 82–98)
MONOCYTES # BLD AUTO: 0.63 THOUSAND/ÂΜL (ref 0.17–1.22)
MONOCYTES NFR BLD AUTO: 7 % (ref 4–12)
NEUTROPHILS # BLD AUTO: 6.55 THOUSANDS/ÂΜL (ref 1.85–7.62)
NEUTS SEG NFR BLD AUTO: 72 % (ref 43–75)
NRBC BLD AUTO-RTO: 0 /100 WBCS
PLATELET # BLD AUTO: 242 THOUSANDS/UL (ref 149–390)
PLATELET # BLD AUTO: 286 THOUSANDS/UL (ref 149–390)
PMV BLD AUTO: 10.1 FL (ref 8.9–12.7)
PMV BLD AUTO: 10.2 FL (ref 8.9–12.7)
POTASSIUM SERPL-SCNC: 3.6 MMOL/L (ref 3.5–5.3)
PROT SERPL-MCNC: 7.5 G/DL (ref 6.4–8.4)
RBC # BLD AUTO: 4.66 MILLION/UL (ref 3.81–5.12)
SODIUM SERPL-SCNC: 138 MMOL/L (ref 135–147)
WBC # BLD AUTO: 8.98 THOUSAND/UL (ref 4.31–10.16)

## 2023-08-15 PROCEDURE — 82948 REAGENT STRIP/BLOOD GLUCOSE: CPT

## 2023-08-15 PROCEDURE — 99223 1ST HOSP IP/OBS HIGH 75: CPT | Performed by: FAMILY MEDICINE

## 2023-08-15 PROCEDURE — 80053 COMPREHEN METABOLIC PANEL: CPT | Performed by: EMERGENCY MEDICINE

## 2023-08-15 PROCEDURE — 99285 EMERGENCY DEPT VISIT HI MDM: CPT | Performed by: EMERGENCY MEDICINE

## 2023-08-15 PROCEDURE — G1004 CDSM NDSC: HCPCS

## 2023-08-15 PROCEDURE — 84484 ASSAY OF TROPONIN QUANT: CPT | Performed by: EMERGENCY MEDICINE

## 2023-08-15 PROCEDURE — 36415 COLL VENOUS BLD VENIPUNCTURE: CPT | Performed by: EMERGENCY MEDICINE

## 2023-08-15 PROCEDURE — 85025 COMPLETE CBC W/AUTO DIFF WBC: CPT | Performed by: EMERGENCY MEDICINE

## 2023-08-15 PROCEDURE — 93005 ELECTROCARDIOGRAM TRACING: CPT

## 2023-08-15 PROCEDURE — 99285 EMERGENCY DEPT VISIT HI MDM: CPT

## 2023-08-15 PROCEDURE — 85049 AUTOMATED PLATELET COUNT: CPT | Performed by: FAMILY MEDICINE

## 2023-08-15 PROCEDURE — 70450 CT HEAD/BRAIN W/O DYE: CPT

## 2023-08-15 PROCEDURE — 84484 ASSAY OF TROPONIN QUANT: CPT | Performed by: FAMILY MEDICINE

## 2023-08-15 RX ORDER — HEPARIN SODIUM 5000 [USP'U]/ML
5000 INJECTION, SOLUTION INTRAVENOUS; SUBCUTANEOUS EVERY 8 HOURS SCHEDULED
Status: DISCONTINUED | OUTPATIENT
Start: 2023-08-15 | End: 2023-08-19 | Stop reason: HOSPADM

## 2023-08-15 RX ORDER — CLONIDINE 0.3 MG/24H
1 PATCH, EXTENDED RELEASE TRANSDERMAL WEEKLY
Status: DISCONTINUED | OUTPATIENT
Start: 2023-08-15 | End: 2023-08-19 | Stop reason: HOSPADM

## 2023-08-15 RX ORDER — INSULIN LISPRO 100 [IU]/ML
1-5 INJECTION, SOLUTION INTRAVENOUS; SUBCUTANEOUS
Status: DISCONTINUED | OUTPATIENT
Start: 2023-08-15 | End: 2023-08-19 | Stop reason: HOSPADM

## 2023-08-15 RX ORDER — LABETALOL 100 MG/1
300 TABLET, FILM COATED ORAL 2 TIMES DAILY
Status: DISCONTINUED | OUTPATIENT
Start: 2023-08-15 | End: 2023-08-19 | Stop reason: HOSPADM

## 2023-08-15 RX ORDER — AMLODIPINE BESYLATE 10 MG/1
10 TABLET ORAL DAILY
Status: DISCONTINUED | OUTPATIENT
Start: 2023-08-15 | End: 2023-08-19 | Stop reason: HOSPADM

## 2023-08-15 RX ORDER — ASPIRIN 81 MG/1
81 TABLET, CHEWABLE ORAL DAILY
Status: DISCONTINUED | OUTPATIENT
Start: 2023-08-15 | End: 2023-08-19 | Stop reason: HOSPADM

## 2023-08-15 RX ORDER — CHLORAL HYDRATE 500 MG
1000 CAPSULE ORAL DAILY
Status: DISCONTINUED | OUTPATIENT
Start: 2023-08-15 | End: 2023-08-17

## 2023-08-15 RX ORDER — CINACALCET 30 MG/1
30 TABLET, FILM COATED ORAL EVERY OTHER DAY
Status: DISCONTINUED | OUTPATIENT
Start: 2023-08-15 | End: 2023-08-19 | Stop reason: HOSPADM

## 2023-08-15 RX ORDER — HYDRALAZINE HYDROCHLORIDE 20 MG/ML
5 INJECTION INTRAMUSCULAR; INTRAVENOUS EVERY 6 HOURS PRN
Status: DISCONTINUED | OUTPATIENT
Start: 2023-08-15 | End: 2023-08-19 | Stop reason: HOSPADM

## 2023-08-15 RX ORDER — PRAVASTATIN SODIUM 80 MG/1
80 TABLET ORAL
Status: DISCONTINUED | OUTPATIENT
Start: 2023-08-15 | End: 2023-08-19 | Stop reason: HOSPADM

## 2023-08-15 RX ORDER — ALLOPURINOL 100 MG/1
100 TABLET ORAL DAILY
Status: DISCONTINUED | OUTPATIENT
Start: 2023-08-15 | End: 2023-08-19 | Stop reason: HOSPADM

## 2023-08-15 RX ADMIN — ALLOPURINOL 100 MG: 100 TABLET ORAL at 17:36

## 2023-08-15 RX ADMIN — OMEGA-3 FATTY ACIDS CAP 1000 MG 1000 MG: 1000 CAP at 17:35

## 2023-08-15 RX ADMIN — AMLODIPINE BESYLATE 10 MG: 10 TABLET ORAL at 17:36

## 2023-08-15 RX ADMIN — HYDRALAZINE HYDROCHLORIDE 5 MG: 20 INJECTION INTRAMUSCULAR; INTRAVENOUS at 18:59

## 2023-08-15 RX ADMIN — LABETALOL HYDROCHLORIDE 300 MG: 100 TABLET, FILM COATED ORAL at 17:36

## 2023-08-15 RX ADMIN — TICAGRELOR 90 MG: 90 TABLET ORAL at 22:23

## 2023-08-15 RX ADMIN — ASPIRIN 81 MG: 81 TABLET, CHEWABLE ORAL at 17:36

## 2023-08-15 RX ADMIN — CINACALCET 30 MG: 30 TABLET, FILM COATED ORAL at 17:36

## 2023-08-15 RX ADMIN — PRAVASTATIN SODIUM 80 MG: 80 TABLET ORAL at 17:35

## 2023-08-15 RX ADMIN — INSULIN LISPRO 2 UNITS: 100 INJECTION, SOLUTION INTRAVENOUS; SUBCUTANEOUS at 18:08

## 2023-08-15 RX ADMIN — HEPARIN SODIUM 5000 UNITS: 5000 INJECTION INTRAVENOUS; SUBCUTANEOUS at 17:36

## 2023-08-15 RX ADMIN — HEPARIN SODIUM 5000 UNITS: 5000 INJECTION INTRAVENOUS; SUBCUTANEOUS at 22:23

## 2023-08-15 RX ADMIN — CLONIDINE 0.3 MG: 0.3 PATCH TRANSDERMAL at 18:08

## 2023-08-15 NOTE — TELEPHONE ENCOUNTER
Pt's son Carito Bethea called. He is concerned his mother may have had another stroke. He feels the past few days "her legs and speech have gone backwards". She had had slight improvements after d/c but feels now her symptoms are much worse. Pt currently on clearance board pending cardiac clearance for L TCAR w/ Dr. Sameera Mahoney. She is scheduled for stress and echo 8/17 and f/u cardiology visit 8/24. Carito Bethea states he wanted to take pt to hospital last evening but she refused. PT and speech therapy have been seeing pt in home. Speech was there once last week and are coming back tomorrow. Pt has difficulty finding words to communicate and this is now worse. At times she has difficulty ambulating and standing from sofa. Her speech is not slurred. Carito Bethea states since her stroke she has difficulty seeing out of R eye, this is unchanged. 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 Her speech is not slurred and he denies she has facial drooping. He requested I s/w his mother, pt had difficulty communicating, she states she can see the words in her mind but cannot say them. She states her L side is ok however she continues to have numbness in her R arm and leg. Advised pt and son if they feel symptoms are worsening she go to ED for eval.  Pt declined. Advised I would send message to our provider for recommendations and we would get back to them.

## 2023-08-15 NOTE — ED PROVIDER NOTES
History  Chief Complaint   Patient presents with   • Aphasia     Aphasia x 3 days, recent stroke      79-year-old female history of hypertension, diabetes, recent stroke on Brilinta presenting with new aphasia. Patient recently seen and evaluated a few weeks ago for right upper extremity motor deficits and hand clumsiness well as dysphagia. Was admitted for stroke and recommended short-term rehab but patient declined and went home. Patient reports new aphasia beginning a few days ago. Initially gradual but has been constant for the past 1 to 2 days. Patient able to say some words normally but overall having difficulty with word finding. Denies any new motor or sensory deficits. Denies any chest pain shortness of breath. Denies abdominal pain nausea vomiting diarrhea. Denies any falls or head strike. Denies any other complaints. Chart reviewed. Past Medical History:  No date: Arthritis  No date: Chronic kidney disease  No date: Endometriosis  No date: High cholesterol  No date: Hypertension  No date: Kidney disease, chronic, stage III (moderate, EGFR 30-59 ml/  min) (Formerly [facility])  No date: Lumbar disc herniation  No date: Neuropathy  No date: Peripheral vascular disease (Formerly [facility])  No date: Pneumonia  No date: Shoulder injury      Comment:  left  No date: Spinal stenosis  2015: Stroke ([address])      Comment:  Memory loss  Family History: non-contributory  Social History            Prior to Admission Medications   Prescriptions Last Dose Informant Patient Reported? Taking? Alcohol Swabs 70 % PADS Unknown Self, Child No No   Sig: May substitute brand based on insurance coverage. Check glucose ACHS. Blood Glucose Monitoring Suppl (OneTouch Verio Reflect) w/Device KIT Unknown Self, Child No No   Sig: May substitute brand based on insurance coverage. Check glucose ACHS.    Omega-3 Fatty Acids (fish oil) 1,000 mg 8/15/2023 Self, Child No Yes   Sig: Take 1 capsule (1,000 mg total) by mouth daily Do not start before July 30, 4152Patrisha Aminta Collins 43T MISC Unknown Self, Child No No   Sig: May substitute brand based on insurance coverage. Check glucose ACHS. allopurinol (ZYLOPRIM) 100 mg tablet 8/15/2023 Self, Child No Yes   Sig: TAKE 1 TABLET BY MOUTH EVERY DAY   amLODIPine (NORVASC) 10 mg tablet 8/15/2023 Self, Child No Yes   Sig: Take 1 tablet (10 mg total) by mouth daily   aspirin 81 mg chewable tablet 8/15/2023 Self, Child No Yes   Sig: Chew 1 tablet (81 mg total) daily Do not start before July 30, 2023. cinacalcet (SENSIPAR) 30 mg tablet 8/15/2023  No Yes   Sig: Take 1 tablet (30 mg total) by mouth every other day   cloNIDine (CATAPRES-TTS-3) 0.3 mg/24 hr 8/15/2023 Self, Child No Yes   Sig: PLACE 1 PATCH (0.3 MG TOTAL) ON THE SKIN ONCE A WEEK   glipiZIDE (GLUCOTROL) 5 mg tablet Unknown  No No   Sig: Take 1 tablet (5 mg total) by mouth daily with breakfast   glucose blood (OneTouch Verio) test strip Unknown Self, Child No No   Sig: May substitute brand based on insurance coverage. Check glucose ACHS.    labetalol (NORMODYNE) 300 mg tablet 8/15/2023 Self, Child No Yes   Sig: Take 1 tablet (300 mg total) by mouth 2 (two) times a day   losartan (COZAAR) 100 MG tablet Unknown Self, Child No No   Sig: Take 1 tablet (100 mg total) by mouth daily   pravastatin (PRAVACHOL) 80 mg tablet 8/15/2023 Self, Child No Yes   Sig: Take 1 tablet (80 mg total) by mouth daily with dinner for 14 days   ticagrelor (BRILINTA) 90 MG 8/14/2023 Self, Child No Yes   Sig: Take 1 tablet (90 mg total) by mouth every 12 (twelve) hours   zinc oxide 20 % ointment Unknown Self, Child No No   Sig: Apply topically as needed for irritation      Facility-Administered Medications: None       Past Medical History:   Diagnosis Date   • Arthritis    • Chronic kidney disease    • Endometriosis    • High cholesterol    • Hypertension    • Kidney disease, chronic, stage III (moderate, EGFR 30-59 ml/min) (Formerly [facility])    • Lumbar disc herniation    • Neuropathy    • Peripheral vascular disease ([address] )    • Pneumonia    • Shoulder injury     left   • Spinal stenosis    • Stroke [facility]) 2015    Memory loss       Past Surgical History:   Procedure Laterality Date   • CARDIAC CATHETERIZATION     • COLONOSCOPY     • FL RETROGRADE PYELOGRAM  4/6/2020   • FRACTURE SURGERY Right     ankle   • OVARIAN CYST SURGERY     • LA CYSTO BLADDER W/URETERAL CATHETERIZATION Bilateral 4/6/2020    Procedure: CYSTOSCOPY WITH RETROGRADE PYELOGRAM;  Surgeon: Nahum Arizmendi MD;  [facility]: [facility];  Service: Urology   • LA CYSTO W/INSERT URETERAL STENT Right 4/6/2020    Procedure: INSERTION STENT URETERAL;  Surgeon: Nahum Arizmendi MD;  [facility];  Service: Urology   • LA CYSTO W/REMOVAL OF TUMORS SMALL N/A 4/6/2020    Procedure: TRANSURETHRAL RESECTION OF BLADDER TUMOR (TURBT); Surgeon: Nahum Arizmendi MD;  Location[facility];  Service: Urology   • ROTATOR CUFF REPAIR Left    • TONSILLECTOMY         Family History   Problem Relation Age of Onset   • Hypertension Mother    • Heart disease Mother         Valvular   • Hyperlipidemia Mother    • Hypertension Father    • Heart defect Father         Cardiomegaly   • Stroke Sister         Cerebrovascular Accident   • Arthritis Brother    • Other Brother         Back Disorder     I have reviewed and agree with the history as documented.     E-Cigarette/Vaping   • E-Cigarette Use Never User      E-Cigarette/Vaping Substances   • Nicotine No    • THC No    • CBD No    • Flavoring No    • Other No    • Unknown No      Social History     Tobacco Use   • Smoking status: Former     Packs/day: 2.00     Years: 40.00     Total pack years: 80.00     Types: Cigarettes     Start date: 1966     Quit date: 7/27/2020     Years since quitting: 3.0   • Smokeless tobacco: Never   Vaping Use   • Vaping Use: Never used   Substance Use Topics   • Alcohol use: Never   • Drug use: Never       Review of Systems   Constitutional: Negative for appetite change, chills, diaphoresis, fever and unexpected weight change. HENT: Negative for congestion and rhinorrhea. Eyes: Negative for photophobia and visual disturbance. Respiratory: Negative for cough, chest tightness and shortness of breath. Cardiovascular: Negative for chest pain, palpitations and leg swelling. Gastrointestinal: Negative for abdominal distention, abdominal pain, blood in stool, constipation, diarrhea, nausea and vomiting. Genitourinary: Negative for dysuria and hematuria. Musculoskeletal: Negative for back pain, joint swelling, neck pain and neck stiffness. Skin: Negative for color change, pallor, rash and wound. Neurological: Positive for speech difficulty. Negative for dizziness, syncope, weakness, light-headedness and headaches. Psychiatric/Behavioral: Negative for agitation. All other systems reviewed and are negative. Physical Exam  Physical Exam  Vitals and nursing note reviewed. Constitutional:       General: She is not in acute distress. Appearance: Normal appearance. She is well-developed. She is not ill-appearing, toxic-appearing or diaphoretic. HENT:      Head: Normocephalic and atraumatic. Nose: Nose normal. No congestion or rhinorrhea. Mouth/Throat:      Mouth: Mucous membranes are moist.      Pharynx: Oropharynx is clear. No oropharyngeal exudate or posterior oropharyngeal erythema. Eyes:      General: No scleral icterus. Right eye: No discharge. Left eye: No discharge. Extraocular Movements: Extraocular movements intact. Conjunctiva/sclera: Conjunctivae normal.      Pupils: Pupils are equal, round, and reactive to light. Neck:      Vascular: No JVD. Trachea: No tracheal deviation. Comments: Supple. Normal range of motion. Cardiovascular:      Rate and Rhythm: Normal rate and regular rhythm. Heart sounds: Normal heart sounds. No murmur heard. No friction rub. No gallop.       Comments: Normal rate and regular rhythm  Pulmonary:      Effort: Pulmonary effort is normal. No respiratory distress. Breath sounds: Normal breath sounds. No stridor. No wheezing or rales. Comments: Clear to auscultation bilaterally  Chest:      Chest wall: No tenderness. Abdominal:      General: Bowel sounds are normal. There is no distension. Palpations: Abdomen is soft. Tenderness: There is no abdominal tenderness. There is no right CVA tenderness, left CVA tenderness, guarding or rebound. Comments: Soft, nontender, nondistended. Normal bowel sounds throughout   Musculoskeletal:         General: No swelling, tenderness, deformity or signs of injury. Normal range of motion. Cervical back: Normal range of motion and neck supple. No rigidity. No muscular tenderness. Right lower leg: No edema. Left lower leg: No edema. Lymphadenopathy:      Cervical: No cervical adenopathy. Skin:     General: Skin is warm and dry. Coloration: Skin is not pale. Findings: No erythema or rash. Neurological:      Mental Status: She is alert and oriented to person, place, and time. Cranial Nerves: Cranial nerve deficit present. Sensory: No sensory deficit. Motor: Weakness present. No abnormal muscle tone. Coordination: Coordination normal.      Gait: Gait normal.      Comments: A&Ox3 to person, place, and time. Moderate aphasia. CN 2-12 otherwise intact. Mildly decreased right  strength. Strength otherwise 5/5 throughout. Sensation intact throughout. Cerebellar exam including gait intact. Psychiatric:         Behavior: Behavior normal.         Thought Content:  Thought content normal.         Vital Signs  ED Triage Vitals [08/15/23 1110]   Temperature Pulse Respirations Blood Pressure SpO2   97.7 °F (36.5 °C) 67 16 135/55 98 %      Temp Source Heart Rate Source Patient Position - Orthostatic VS BP Location FiO2 (%)   Oral Monitor Lying Right arm --      Pain Score       No Pain           Vitals:    08/15/23 1330 08/15/23 1430 08/15/23 1730 08/15/23 1830   BP: 135/77 164/66 (!) 180/67 (!) 173/74   Pulse: 70 65 65 63   Patient Position - Orthostatic VS: Lying Lying Sitting Sitting         Visual Acuity  Visual Acuity    Flowsheet Row Most Recent Value   L Pupil Size (mm) 3   R Pupil Size (mm) 2   L Pupil Shape Round   R Pupil Shape Round          ED Medications  Medications   aspirin chewable tablet 81 mg (81 mg Oral Given 8/15/23 1736)   cinacalcet (SENSIPAR) tablet 30 mg (30 mg Oral Given 8/15/23 1736)   amLODIPine (NORVASC) tablet 10 mg (10 mg Oral Given 8/15/23 1736)   allopurinol (ZYLOPRIM) tablet 100 mg (100 mg Oral Given 8/15/23 1736)   cloNIDine (CATAPRES-TTS-3) 0.3 mg/24 hr TD weekly patch (0.3 mg Transdermal Medication Applied 8/15/23 1808)   labetalol (NORMODYNE) tablet 300 mg (300 mg Oral Given 8/15/23 1736)   fish oil capsule 1,000 mg (1,000 mg Oral Given 8/15/23 1735)   pravastatin (PRAVACHOL) tablet 80 mg (80 mg Oral Given 8/15/23 1735)   ticagrelor (BRILINTA) tablet 90 mg (has no administration in time range)   heparin (porcine) subcutaneous injection 5,000 Units (5,000 Units Subcutaneous Given 8/15/23 1736)   insulin lispro (HumaLOG) 100 units/mL subcutaneous injection 1-5 Units (2 Units Subcutaneous Given 8/15/23 1808)   insulin lispro (HumaLOG) 100 units/mL subcutaneous injection 1-5 Units (has no administration in time range)   hydrALAZINE (APRESOLINE) injection 5 mg (5 mg Intravenous Given 8/15/23 1859)       Diagnostic Studies  Results Reviewed     Procedure Component Value Units Date/Time    Fingerstick Glucose (POCT) [[id number]]  (Abnormal) Collected: 08/15/23 1657    Lab Status: Final result Updated: 08/15/23 1659     POC Glucose 230 mg/dl     HS Troponin I 4hr [[id number]]  (Normal) Collected: 08/15/23 1526    Lab Status: Final result Specimen: Blood from Arm, Left Updated: 08/15/23 1634     hs TnI 4hr 4 ng/L      Delta 4hr hsTnI -1 ng/L     Platelet count [[id number]]  (Normal) Collected: 08/15/23 1557    Lab Status: Final result Specimen: Blood from Arm, Left Updated: 08/15/23 1602     Platelets 058 Thousands/uL      MPV 10.1 fL     HS Troponin I 2hr [[id number]]  (Normal) Collected: 08/15/23 1326    Lab Status: Final result Specimen: Blood from Arm, Left Updated: 08/15/23 1402     hs TnI 2hr 5 ng/L      Delta 2hr hsTnI 0 ng/L     Comprehensive metabolic panel [[id number]]  (Abnormal) Collected: 08/15/23 1126    Lab Status: Final result Specimen: Blood from Arm, Left Updated: 08/15/23 1158     Sodium 138 mmol/L      Potassium 3.6 mmol/L      Chloride 106 mmol/L      CO2 21 mmol/L      ANION GAP 11 mmol/L      BUN 26 mg/dL      Creatinine 1.89 mg/dL      Glucose 267 mg/dL      Calcium 9.8 mg/dL      AST 14 U/L      ALT 15 U/L      Alkaline Phosphatase 87 U/L      Total Protein 7.5 g/dL      Albumin 4.3 g/dL      Total Bilirubin 0.71 mg/dL      eGFR 25 ml/min/1.73sq m     Narrative:      [place] guidelines for Chronic Kidney Disease (CKD):   •  Stage 1 with normal or high GFR (GFR > 90 mL/min/1.73 square meters)  •  Stage 2 Mild CKD (GFR = 60-89 mL/min/1.73 square meters)  •  Stage 3A Moderate CKD (GFR = 45-59 mL/min/1.73 square meters)  •  Stage 3B Moderate CKD (GFR = 30-44 mL/min/1.73 square meters)  •  Stage 4 Severe CKD (GFR = 15-29 mL/min/1.73 square meters)  •  Stage 5 End Stage CKD (GFR <15 mL/min/1.73 square meters)  Note: GFR calculation is accurate only with a steady state creatinine    HS Troponin 0hr (reflex protocol) [[id number]]  (Normal) Collected: 08/15/23 1126    Lab Status: Final result Specimen: Blood from Arm, Left Updated: 08/15/23 1157     hs TnI 0hr 5 ng/L     CBC and differential [[id number]] Collected: 08/15/23 1126    Lab Status: Final result Specimen: Blood from Arm, Left Updated: 08/15/23 1136     WBC 8.98 Thousand/uL      RBC 4.66 Million/uL      Hemoglobin 14.4 g/dL      Hematocrit 40.8 %      MCV 88 fL      MCH 30.9 pg      MCHC 35.3 g/dL      RDW 14.1 %      MPV 10.2 fL      Platelets 394 Thousands/uL      nRBC 0 /100 WBCs      Neutrophils Relative 72 %      Immat GRANS % 1 %      Lymphocytes Relative 15 %      Monocytes Relative 7 %      Eosinophils Relative 4 %      Basophils Relative 1 %      Neutrophils Absolute 6.55 Thousands/µL      Immature Grans Absolute 0.08 Thousand/uL      Lymphocytes Absolute 1.33 Thousands/µL      Monocytes Absolute 0.63 Thousand/µL      Eosinophils Absolute 0.32 Thousand/µL      Basophils Absolute 0.07 Thousands/µL     Fingerstick Glucose (POCT) [[id number]]  (Abnormal) Collected: 08/15/23 1102    Lab Status: Final result Updated: 08/15/23 1103     POC Glucose 263 mg/dl                  CT head wo contrast   Final Result by Gisselle Ortiz MD (08/15 1313)      Evolving subacute left nonhemorrhagic posterior cerebral artery distribution infarction.          Workstation performed: [id number]                    Procedures  Procedures         ED Course             HEART Risk Score    Flowsheet Row Most Recent Value   Heart Score Risk Calculator    History 0 Filed at: 08/15/2023 1300   ECG 1 Filed at: 08/15/2023 1300   Age 2 Filed at: 08/15/2023 1300   Risk Factors 2 Filed at: 08/15/2023 1300   Troponin 0 Filed at: 08/15/2023 1300   HEART Score 5 Filed at: 08/15/2023 1300           Stroke Assessment     Row Name 08/15/23 1119             NIH Stroke Scale    Interval Baseline      Level of Consciousness (1a.) 0      LOC Questions (1b.) 0      LOC Commands (1c.) 0      Best Gaze (2.) 0      Visual (3.) 0      Facial Palsy (4.) 0      Motor Arm, Left (5a.) 0      Motor Arm, Right (5b.) 1      Motor Leg, Left (6a.) 0      Motor Leg, Right (6b.) 0      Limb Ataxia (7.) 0      Sensory (8.) 0      Best Language (9.) 1      Dysarthria (10.) 0      Extinction and Inattention (11.) (Formerly Neglect) 0      Total 2              Flowsheet Row Most Recent Value   Thrombolytic Decision Options    Thrombolytic Decision Patient not a candidate. Patient is not a candidate options Unclear time of onset outside appropriate time window. SBIRT 22yo+    Flowsheet Row Most Recent Value   Initial Alcohol Screen: US AUDIT-C     1. How often do you have a drink containing alcohol? 0 Filed at: 08/15/2023 1102   2. How many drinks containing alcohol do you have on a typical day you are drinking? 0 Filed at: 08/15/2023 1102   3a. Male UNDER 65: How often do you have five or more drinks on one occasion? 0 Filed at: 08/15/2023 1102   3b. FEMALE Any Age, or MALE 65+: How often do you have 4 or more drinks on one occassion? 0 Filed at: 08/15/2023 1102   Audit-C Score 0 Filed at: 08/15/2023 1102   DALE: How many times in the past year have you. .. Used an illegal drug or used a prescription medication for non-medical reasons? Never Filed at: 08/15/2023 1102                    Medical Decision Making  68-year-old female history of hypertension, diabetes, recent stroke on Brilinta presenting with new aphasia. No aphasia in setting of recent stroke concerning for possible recurrent stroke or hemorrhagic conversion. Patient outside window. Plan for CT imaging. Also plan for basic labs. Cardiac evaluation with EKG and troponin. Reassess. EKG interpreted by me with normal sinus rhythm and nonspecific ST abnormality. Labs interpreted by me notable for creatinine of 1.89 which is slightly elevated from baseline per chart review. Also notable for troponin of 5 with normal delta. Unable to scan with contrast given worsening kidney function. Her symptoms remain unchanged. CT with progression of subacute infarct. Patient amenable to short-term rehab. Admitted. Amount and/or Complexity of Data Reviewed  Labs: ordered. Radiology: ordered. Risk  Decision regarding hospitalization.           Disposition  Final diagnoses:   Aphasia   Stroke-like symptoms     Time reflects when diagnosis was documented in both MDM as applicable and the Disposition within this note     Time User Action Codes Description Comment    8/15/2023  1:49 PM Yoan Rios Add [R47.01] Aphasia     8/15/2023  1:49 PM Yoan Rios Add [R29.90] Stroke-like symptoms     8/15/2023  3:27 PM Pool Carlita N Add [R47.1] Dysarthria     8/15/2023  3:27 PM Des Gibbons Add [E11.51] Type 2 diabetes mellitus with diabetic peripheral angiopathy without gangrene, without long-term current use of insulin ([address])     8/15/2023  5:47 PM Oneita Butts Add [I65.29] Carotid stenosis       ED Disposition     ED Disposition   Admit    Condition   Stable    Date/Time   Tue Aug 15, 2023  1:49 PM    Comment   Case was discussed with NICOLLE and the patient's admission status was agreed to be Admission Status: observation status to the service of Dr. Xiao Mendoza . Follow-up Information    None         Current Discharge Medication List      CONTINUE these medications which have NOT CHANGED    Details   allopurinol (ZYLOPRIM) 100 mg tablet TAKE 1 TABLET BY MOUTH EVERY DAY  Qty: 90 tablet, Refills: 2    Comments: DX Code Needed  . Associated Diagnoses: Hyperuricemia      amLODIPine (NORVASC) 10 mg tablet Take 1 tablet (10 mg total) by mouth daily  Qty: 90 tablet, Refills: 1    Comments: Hypertension associated with diabetes (HCC) (E11.59 , I15.2)  Associated Diagnoses: Hyperlipidemia associated with type 2 diabetes mellitus ([address]); Hypertension associated with diabetes ([address]); Aortoiliac stenosis, left ([address]); Claudication in peripheral vascular disease ([address]); Basilar artery stenosis      aspirin 81 mg chewable tablet Chew 1 tablet (81 mg total) daily Do not start before July 30, 2023. Qty: 30 tablet, Refills: 0    Associated Diagnoses: CVA (cerebral vascular accident) ([address]);  Hyperlipidemia associated with type 2 diabetes mellitus (HCC)      cinacalcet (SENSIPAR) 30 mg tablet Take 1 tablet (30 mg total) by mouth every other day  Qty: 45 tablet, Refills: 2    Associated Diagnoses: Stage 4 chronic kidney disease ([address]); Secondary hyperparathyroidism of renal origin ([address]); Hypercalcemia      cloNIDine (CATAPRES-TTS-3) 0.3 mg/24 hr PLACE 1 PATCH (0.3 MG TOTAL) ON THE SKIN ONCE A WEEK  Qty: 12 patch, Refills: 1    Comments: Hypertension associated with diabetes ([address]) (E11.59 , I15.2)  Associated Diagnoses: Hypertension associated with diabetes ([address]); Rash; Hypertension, unspecified type; Dependent edema; Aortoiliac stenosis, left ([address]); History of CVA (cerebrovascular accident); Claudication in peripheral vascular disease ([address]); Basilar artery stenosis; Hyperlipidemia associated with type 2 diabetes mellitus (HCC)      labetalol (NORMODYNE) 300 mg tablet Take 1 tablet (300 mg total) by mouth 2 (two) times a day  Qty: 180 tablet, Refills: 1    Comments: Acute drug-induced gout of left foot (B28.314)  Associated Diagnoses: Acute drug-induced gout of left foot      Omega-3 Fatty Acids (fish oil) 1,000 mg Take 1 capsule (1,000 mg total) by mouth daily Do not start before July 30, 2023. Qty: 30 capsule, Refills: 0    Associated Diagnoses: CVA (cerebral vascular accident) ([address]); Hyperlipidemia associated with type 2 diabetes mellitus (HCC)      pravastatin (PRAVACHOL) 80 mg tablet Take 1 tablet (80 mg total) by mouth daily with dinner for 14 days  Qty: 90 tablet, Refills: 1    Associated Diagnoses: Hyperlipidemia associated with type 2 diabetes mellitus (HCC)      ticagrelor (BRILINTA) 90 MG Take 1 tablet (90 mg total) by mouth every 12 (twelve) hours  Qty: 60 tablet, Refills: 1    Associated Diagnoses: CVA (cerebral vascular accident) ([address])      Alcohol Swabs 70 % PADS May substitute brand based on insurance coverage. Check glucose ACHS.   Qty: 200 each, Refills: 0    Associated Diagnoses: Type 2 diabetes mellitus with diabetic nephropathy, without long-term current use of insulin (HCC)      Blood Glucose Monitoring Suppl (OneTouch Verio Reflect) w/Device KIT May substitute brand based on insurance coverage. Check glucose ACHS. Qty: 1 kit, Refills: 0    Associated Diagnoses: Type 2 diabetes mellitus with diabetic nephropathy, without long-term current use of insulin ([facility])      glipiZIDE (GLUCOTROL) 5 mg tablet Take 1 tablet (5 mg total) by mouth daily with breakfast  Qty: 30 tablet, Refills: 5    Associated Diagnoses: Type 2 diabetes mellitus with diabetic nephropathy, without long-term current use of insulin ([facility])      glucose blood (OneTouch Verio) test strip May substitute brand based on insurance coverage. Check glucose ACHS. Qty: 200 each, Refills: 0    Associated Diagnoses: Type 2 diabetes mellitus with diabetic nephropathy, without long-term current use of insulin ([facility])      losartan (COZAAR) 100 MG tablet Take 1 tablet (100 mg total) by mouth daily  Qty: 90 tablet, Refills: 1    Comments: Hypertension, unspecified type [I10]  Associated Diagnoses: Hypertension, unspecified type      OneTouch Delica Lancets 37L MISC May substitute brand based on insurance coverage. Check glucose ACHS. Qty: 200 each, Refills: 0    Associated Diagnoses: Type 2 diabetes mellitus with diabetic nephropathy, without long-term current use of insulin (HCC)      zinc oxide 20 % ointment Apply topically as needed for irritation  Qty: 56.7 g, Refills: 0    Associated Diagnoses: Wound of gluteal cleft, unspecified laterality, initial encounter             No discharge procedures on file.     PDMP Review       Value Time User    PDMP Reviewed  Yes 4/6/2020  8:10 AM Bossman Moreno MD          ED Provider  Electronically Signed by           Marv Hernandez MD  08/15/23 [id number]

## 2023-08-15 NOTE — PLAN OF CARE
Problem: MOBILITY - ADULT  Goal: Maintain or return to baseline ADL function  Description: INTERVENTIONS:  -  Assess patient's ability to carry out ADLs; assess patient's baseline for ADL function and identify physical deficits which impact ability to perform ADLs (bathing, care of mouth/teeth, toileting, grooming, dressing, etc.)  - Assess/evaluate cause of self-care deficits   - Assess range of motion  - Assess patient's mobility; develop plan if impaired  - Assess patient's need for assistive devices and provide as appropriate  - Encourage maximum independence but intervene and supervise when necessary  - Involve family in performance of ADLs  - Assess for home care needs following discharge   - Consider OT consult to assist with ADL evaluation and planning for discharge  - Provide patient education as appropriate  Outcome: Progressing  Goal: Maintains/Returns to pre admission functional level  Description: INTERVENTIONS:  - Perform BMAT or MOVE assessment daily.   - Set and communicate daily mobility goal to care team and patient/family/caregiver. - Collaborate with rehabilitation services on mobility goals if consulted  - Perform Range of Motion 3 times a day. - Reposition patient every 2 hours.   - Dangle patient 3 times a day  - Stand patient 3 times a day  - Ambulate patient 3 times a day  - Out of bed to chair 3 times a day   - Out of bed for meals 3 times a day  - Out of bed for toileting  - Record patient progress and toleration of activity level   Outcome: Progressing     Problem: PAIN - ADULT  Goal: Verbalizes/displays adequate comfort level or baseline comfort level  Description: Interventions:  - Encourage patient to monitor pain and request assistance  - Assess pain using appropriate pain scale  - Administer analgesics based on type and severity of pain and evaluate response  - Implement non-pharmacological measures as appropriate and evaluate response  - Consider cultural and social influences on pain and pain management  - Notify physician/advanced practitioner if interventions unsuccessful or patient reports new pain  Outcome: Progressing     Problem: INFECTION - ADULT  Goal: Absence or prevention of progression during hospitalization  Description: INTERVENTIONS:  - Assess and monitor for signs and symptoms of infection  - Monitor lab/diagnostic results  - Monitor all insertion sites, i.e. indwelling lines, tubes, and drains  - Monitor endotracheal if appropriate and nasal secretions for changes in amount and color  - [place] appropriate cooling/warming therapies per order  - Administer medications as ordered  - Instruct and encourage patient and family to use good hand hygiene technique  - Identify and instruct in appropriate isolation precautions for identified infection/condition  Outcome: Progressing  Goal: Absence of fever/infection during neutropenic period  Description: INTERVENTIONS:  - Monitor WBC    Outcome: Progressing     Problem: SAFETY ADULT  Goal: Maintain or return to baseline ADL function  Description: INTERVENTIONS:  -  Assess patient's ability to carry out ADLs; assess patient's baseline for ADL function and identify physical deficits which impact ability to perform ADLs (bathing, care of mouth/teeth, toileting, grooming, dressing, etc.)  - Assess/evaluate cause of self-care deficits   - Assess range of motion  - Assess patient's mobility; develop plan if impaired  - Assess patient's need for assistive devices and provide as appropriate  - Encourage maximum independence but intervene and supervise when necessary  - Involve family in performance of ADLs  - Assess for home care needs following discharge   - Consider OT consult to assist with ADL evaluation and planning for discharge  - Provide patient education as appropriate  Outcome: Progressing  Goal: Maintains/Returns to pre admission functional level  Description: INTERVENTIONS:  - Perform BMAT or MOVE assessment daily.   - Set and communicate daily mobility goal to care team and patient/family/caregiver. - Collaborate with rehabilitation services on mobility goals if consulted  - Perform Range of Motion 3 times a day. - Reposition patient every 2 hours.   - Dangle patient 3 times a day  - Stand patient 3 times a day  - Ambulate patient 3 times a day  - Out of bed to chair 3 times a day   - Out of bed for meals 3 times a day  - Out of bed for toileting  - Record patient progress and toleration of activity level   Outcome: Progressing  Goal: Patient will remain free of falls  Description: INTERVENTIONS:  - Educate patient/family on patient safety including physical limitations  - Instruct patient to call for assistance with activity   - Consult OT/PT to assist with strengthening/mobility   - Keep Call bell within reach  - Keep bed low and locked with side rails adjusted as appropriate  - Keep care items and personal belongings within reach  - Initiate and maintain comfort rounds  - Make Fall Risk Sign visible to staff  - Offer Toileting every 2 Hours, in advance of need  - Initiate/Maintain bed alarm  - Obtain necessary fall risk management equipment  - Apply yellow socks and bracelet for high fall risk patients  - Consider moving patient to room near nurses station  Outcome: Progressing     Problem: DISCHARGE PLANNING  Goal: Discharge to home or other facility with appropriate resources  Description: INTERVENTIONS:  - Identify barriers to discharge w/patient and caregiver  - Arrange for needed discharge resources and transportation as appropriate  - Identify discharge learning needs (meds, wound care, etc.)  - Arrange for interpretive services to assist at discharge as needed  - Refer to Case Management Department for coordinating discharge planning if the patient needs post-hospital services based on physician/advanced practitioner order or complex needs related to functional status, cognitive ability, or social support system  Outcome: Progressing     Problem: Knowledge Deficit  Goal: Patient/family/caregiver demonstrates understanding of disease process, treatment plan, medications, and discharge instructions  Description: Complete learning assessment and assess knowledge base.   Interventions:  - Provide teaching at level of understanding  - Provide teaching via preferred learning methods  Outcome: Progressing

## 2023-08-15 NOTE — ASSESSMENT & PLAN NOTE
Lab Results   Component Value Date    EGFR 25 08/15/2023    EGFR 26 08/03/2023    EGFR 36 07/28/2023    CREATININE 1.89 (H) 08/15/2023    CREATININE 1.80 (H) 08/03/2023    CREATININE 1.41 (H) 07/28/2023   · Stable.   The patient has been anywhere from 1.4-1.8

## 2023-08-15 NOTE — TELEPHONE ENCOUNTER
Spoke with Huang Ashton and made him aware of provider's response. Huang Ashton stated that pt is now agreeable to being evaluated in the ED. Huang Ashton will be taking her shortly.

## 2023-08-15 NOTE — H&P
[address]  H&P  Name: Rosio Lawson 70 y.o. female I MRN: [id number]  Unit/Bed#: ED 15 I Date of Admission: 8/15/2023   Date of Service: 8/15/2023 I Hospital Day: 0      Assessment/Plan   Chronic kidney disease  Assessment & Plan  Lab Results   Component Value Date    EGFR 25 08/15/2023    EGFR 26 08/03/2023    EGFR 36 07/28/2023    CREATININE 1.89 (H) 08/15/2023    CREATININE 1.80 (H) 08/03/2023    CREATININE 1.41 (H) 07/28/2023   · Stable. The patient has been anywhere from 1.4-1.8    Hypertension  Assessment & Plan  · Avoid hypotension    Claudication in peripheral vascular disease ([address])  Assessment & Plan  · Continue home medications    Chronic GERD  Assessment & Plan  · Continue PPI    CVA (cerebral vascular accident) ([address])  Assessment & Plan  · Continue aspirin and Brilinta  · We will consult neurology with the recent large stroke and the worsening symptoms    Basilar artery stenosis  Assessment & Plan  · Continue antithrombotics    * Dysarthria  Assessment & Plan  · Patient with worsening dysarthria with recent large stroke. · Likely just evolution of her previous stroke  · Continue with the Brilinta. · I will put the patient on neurochecks. · Given worsening dysarthria and the stroke I need to put the patient on the pathway although I do not think any further imaging at this point is necessary unless neurology feels we may need to repeat an MRI  · To consider vascular surgery consultation          VTE Prophylaxis: Heparin  / sequential compression device   Code Status: Full code  POLST: POLST is not applicable to this patient  Discussion with family: I left a message for the son over the phone    Anticipated Length of Stay:  Patient will be admitted on an Observation basis with an anticipated length of stay of less than 2 midnights.    Justification for Hospital Stay: Denies pain since 2 midnights but would have a low threshold of changing the patient to inpatient tomorrow the patient needs further hospitalization with ongoing symptoms    Total Time for Visit, including Counseling / Coordination of Care: 60 minutes. Greater than 50% of this total time spent on direct patient counseling and coordination of care. Chief Complaint: Difficulty getting words out    History of Present Illness:    Keyona Segura is a 70 y.o. female who presents with difficulty getting words out. The patient had a stroke a few weeks ago and was sent home on Brilinta and aspirin. At that time did the did offer short-term rehab but the patient was adamant about going home. The patient comes in because she is worsening at home. Patient has been having difficulty getting her words out and states her left side is good but her right side is no good. According to the notes from last time the patient had some clumsiness in the right extremity as well as difficulty with word finding. The patient's word finding ability has worsened but the patient currently is a poor historian and has difficulty explaining why she is here. I did try to call the son for more information and left a voicemail for him. Patient denies any other symptoms. Denies any chest pain or shortness of breath. .    Review of Systems:    Review of Systems   Constitutional: Positive for activity change. Neurological: Positive for dizziness, speech difficulty and weakness. All other systems reviewed and are negative.       Past Medical and Surgical History:     Past Medical History:   Diagnosis Date   • Arthritis    • Chronic kidney disease    • Endometriosis    • High cholesterol    • Hypertension    • Kidney disease, chronic, stage III (moderate, EGFR 30-59 ml/min) ([facility])    • Lumbar disc herniation    • Neuropathy    • Peripheral vascular disease ([address])    • Pneumonia    • Shoulder injury     left   • Spinal stenosis    • Stroke ([address]) 2015    Memory loss       Past Surgical History:   Procedure Laterality Date   • CARDIAC CATHETERIZATION • COLONOSCOPY     • FL RETROGRADE PYELOGRAM  4/6/2020   • FRACTURE SURGERY Right     ankle   • OVARIAN CYST SURGERY     • NC CYSTO BLADDER W/URETERAL CATHETERIZATION Bilateral 4/6/2020    Procedure: CYSTOSCOPY WITH RETROGRADE PYELOGRAM;  Surgeon: Ji Ceja MD;  [facility];  Service: Urology   • NC CYSTO W/INSERT URETERAL STENT Right 4/6/2020    Procedure: INSERTION STENT URETERAL;  Surgeon: Ji Ceja MD;  [facility];  Service: Urology   • NC CYSTO W/REMOVAL OF TUMORS SMALL N/A 4/6/2020    Procedure: TRANSURETHRAL RESECTION OF BLADDER TUMOR (TURBT); Surgeon: Ji Ceja MD;  Location[facility];  Service: Urology   • ROTATOR CUFF REPAIR Left    • TONSILLECTOMY         Meds/Allergies:    Prior to Admission medications    Medication Sig Start Date End Date Taking? Authorizing Provider   Alcohol Swabs 70 % PADS May substitute brand based on insurance coverage. Check glucose ACHS. 7/29/23   GRACIELA Ponce   allopurinol (ZYLOPRIM) 100 mg tablet TAKE 1 TABLET BY MOUTH EVERY DAY 1/23/23   Hailey Atkinson MD   amLODIPine (NORVASC) 10 mg tablet Take 1 tablet (10 mg total) by mouth daily 5/15/23   Cody Mon MD   aspirin 81 mg chewable tablet Chew 1 tablet (81 mg total) daily Do not start before July 30, 2023. 7/30/23 8/29/23  GRACIELA Ponce   Blood Glucose Monitoring Suppl (OneTouch Verio Reflect) w/Device KIT May substitute brand based on insurance coverage. Check glucose ACHS.  7/29/23   GRACIELA Ponce   cinacalcet (SENSIPAR) 30 mg tablet Take 1 tablet (30 mg total) by mouth every other day 8/7/23 5/3/24  Hailey Atkinson MD   cloNIDine (CATAPRES-TTS-3) 0.3 mg/24 hr PLACE 1 PATCH (0.3 MG TOTAL) ON THE SKIN ONCE A WEEK 5/1/23   Cody Mon MD   glipiZIDE (GLUCOTROL) 5 mg tablet Take 1 tablet (5 mg total) by mouth daily with breakfast 8/9/23   Cody Mon MD   glucose blood (OneTouch Verio) test strip May substitute brand based on insurance coverage. Check glucose ACHS. 7/29/23   Meredeth Sandifer, CRNP   labetalol (NORMODYNE) 300 mg tablet Take 1 tablet (300 mg total) by mouth 2 (two) times a day 5/15/23   David Ron MD   losartan (COZAAR) 100 MG tablet Take 1 tablet (100 mg total) by mouth daily 7/17/23   David Ron MD   Omega-3 Fatty Acids (fish oil) 1,000 mg Take 1 capsule (1,000 mg total) by mouth daily Do not start before July 30, 2023. 7/30/23 8/29/23  Meredeth Sandifer, CRNP   OneTouch Delica Lancets 40A MISC May substitute brand based on insurance coverage. Check glucose ACHS. 7/29/23   Meredeth Sandifer, CRNP   pravastatin (PRAVACHOL) 80 mg tablet Take 1 tablet (80 mg total) by mouth daily with dinner for 14 days 8/1/23 8/15/23  Anabela Edmondson MD   ticagrelor (BRILINTA) 90 MG Take 1 tablet (90 mg total) by mouth every 12 (twelve) hours 8/1/23 8/31/23  Anabela Edmondson MD   zinc oxide 20 % ointment Apply topically as needed for irritation 6/26/23   David Ron MD     I have reviewed home medications with a medical source (PCP, Pharmacy, other). Allergies:    Allergies   Allergen Reactions   • Fenofibrate Other (See Comments)      blood in urine  hx  Kidney Failure   • Colesevelam Other (See Comments)      leg pains   • Colestipol Itching and Other (See Comments)      Swelling lower legs   • Ezetimibe GI Intolerance   • Statins Myalgia       Social History:     Marital Status: /Civil Union     Patient Pre-hospital Living Situation: Lives with her son  Patient Pre-hospital Level of Mobility: Independent  Patient Pre-hospital Diet Restrictions: None  Substance Use History:   Social History     Substance and Sexual Activity   Alcohol Use Never     Social History     Tobacco Use   Smoking Status Former   • Packs/day: 2.00   • Years: 40.00   • Total pack years: 80.00   • Types: Cigarettes   • Start date: 1966   • Quit date: 7/27/2020   • Years since quitting: 3.0   Smokeless Tobacco Never Social History     Substance and Sexual Activity   Drug Use Never       Family History:    Family History   Problem Relation Age of Onset   • Hypertension Mother    • Heart disease Mother         Valvular   • Hyperlipidemia Mother    • Hypertension Father    • Heart defect Father         Cardiomegaly   • Stroke Sister         Cerebrovascular Accident   • Arthritis Brother    • Other Brother         Back Disorder       Physical Exam:     Vitals:   Blood Pressure: 135/77 (08/15/23 1330)  Pulse: 70 (08/15/23 1330)  Temperature: 97.7 °F (36.5 °C) (08/15/23 1110)  Temp Source: Oral (08/15/23 1110)  Respirations: 14 (08/15/23 1330)  SpO2: 100 % (08/15/23 1330)    Physical Exam    (   General Appearance:    Alert, cooperative, no distress, appears stated age   Head:    Normocephalic, without obvious abnormality, atraumatic   Eyes:    PERRL, conjunctiva/corneas clear, EOM's intact,             Nose:   Nares normal, septum midline, mucosa normal   Throat:   Lips, mucosa, and tongue normal; teeth and gums normal   Neck:   Supple, symmetrical, no adenopathy;        thyroid:  No enlargement/tenderness/nodules; no carotid    bruit or JVD   Back:     Symmetric, no curvature, ROM normal, no CVA tenderness   Lungs:     Clear to auscultation bilaterally, respirations unlabored       Heart:    Regular rate and rhythm, S1 and S2 normal, no murmur, rub    or gallop   Abdomen:     Soft, non-tender, bowel sounds active all four quadrants,     no masses, no organomegaly           Extremities:   Extremities normal, atraumatic, no cyanosis or edema   Pulses:   2+ and symmetric all extremities   Skin:   Skin color, texture, turgor normal, no rashes or lesions   Lymph nodes:   Cervical, supraclavicular, and axillary nodes normal   Neurologic:  Mild right upper extremity weakness         Additional Data:     Lab Results: I have personally reviewed pertinent reports.       Results from last 7 days   Lab Units 08/15/23  1126   WBC Thousand/uL 8.98   HEMOGLOBIN g/dL 14.4   HEMATOCRIT % 40.8   PLATELETS Thousands/uL 286   NEUTROS PCT % 72   LYMPHS PCT % 15   MONOS PCT % 7   EOS PCT % 4     Results from last 7 days   Lab Units 08/15/23  1126   SODIUM mmol/L 138   POTASSIUM mmol/L 3.6   CHLORIDE mmol/L 106   CO2 mmol/L 21   BUN mg/dL 26*   CREATININE mg/dL 1.89*   ANION GAP mmol/L 11   CALCIUM mg/dL 9.8   ALBUMIN g/dL 4.3   TOTAL BILIRUBIN mg/dL 0.71   ALK PHOS U/L 87   ALT U/L 15   AST U/L 14   GLUCOSE RANDOM mg/dL 267*         Results from last 7 days   Lab Units 08/15/23  1102   POC GLUCOSE mg/dl 263*               Imaging: I have personally reviewed pertinent reports. CT head wo contrast   Final Result by Vani Mclaughlin MD (08/15 1313)      Evolving subacute left nonhemorrhagic posterior cerebral artery distribution infarction. Workstation performed: [id number]             Allscripts / Epic Records Reviewed: Yes     ** Please Note: This note has been constructed using a voice recognition system.  **

## 2023-08-15 NOTE — ASSESSMENT & PLAN NOTE
· Patient with worsening dysarthria with recent large stroke. · Likely just evolution of her previous stroke  · Continue with the Brilinta. · I will put the patient on neurochecks.   · Given worsening dysarthria and the stroke I need to put the patient on the pathway although I do not think any further imaging at this point is necessary unless neurology feels we may need to repeat an MRI  · To consider vascular surgery consultation

## 2023-08-15 NOTE — ASSESSMENT & PLAN NOTE
· Continue aspirin and Brilinta  · We will consult neurology with the recent large stroke and the worsening symptoms

## 2023-08-15 NOTE — CONSULTS
Consultation - Neurology   Hipolito Farley 70 y.o. female MRN: [id number]  Unit/Bed#: -01 Encounter: [id number]      Assessment/Plan     Aphasia as late effect of cerebrovascular accident (CVA)  Assessment & Plan  Hipolito Farley is a 70 y.o. right handed female with DM,HTN, HLD, CKD3, PVD, basilar artery stenosis and recent left PCA/MCA territory stroke on asa/brilinta/statin who presented to the ED 8/15/23 with reported worsening aphasia. Initial /55, . [facility] without acute findings. She was admitted for further work-up with neurology consult    Work-up:  Imagin/15/23 CTH: Evolving subacute left nonhemorrhagic posterior cerebral artery distribution infarction. Prior imagin23 carotid US: Impression  RIGHT:  There is 70+% stenosis noted in the internal carotid artery. Plaque is  heterogenous/homogenous and irregular/smooth. Vertebral artery flow is antegrade. There is no significant subclavian artery  disease. LEFT:  There is 70+% stenosis noted in the internal carotid artery. Plaque is  heterogenous/homogenous and irregular/smooth. Vertebral artery flow is antegrade. There is no significant subclavian artery  disease. Compared to previous study on 22 , there is an increase in disease in the  bilateral ICAs  23 MRI: 1. Large area of acute to subacute ischemia left PCA distribution occipital lobe. 2. Small foci of acute to subacute ischemia in the left frontal and left parietal/temporal lobes MCA distribution. 23 CTA head and neck:    1. Hypodensity and loss of gray-white matter differentiation involving left occipital and inferior parietal lobe concerning for evolving acute or subacute infarct. 2.  Chronic microangiopathic change. 3.  High-grade stenosis of the left PCA at proximal P2 segment. Occluded versus hypoplastic left P1 segment with patent P-comm perfusing left PCA. 4.  Severe left and moderate right bilateral supraclinoid ICA stenosis.   5.  Severe stenosis of the left vertebral artery origin. Patent narrow caliber left vertebral artery in the cervical and pre-PICA intracranial segments with multifocal severe stenosis. Post PICA segment is occluded. 6.  Multifocal high-grade stenosis of the right intracranial vertebral artery. 7.  Advanced atherosclerotic change of bilateral cervical carotid arteries. Estimated 85-90% left and 80% right proximal ICA stenosis. 8.  Atherosclerotic disease of the aortic arch with linear defect at the origin of left subclavian artery. Differentials include ulcerated plaque versus focal dissection flap (likely chronic). Pertinent labs:  8/16/23 Lipid panel: Total cholesterol 200, triglycerides 378, LDL 79  7/28/23 Lipid Panel: Total cholesterol 368, triglycerides 329,   7/27/23 A1c 9.1  Relevant home meds:  Asa 81mg daily started 7/29/23  brilinta 90mg bid started 7/28/23  Pravastatin 80mg daily (Statins listed as allergy for patient (side effect of myalgia). Pt was agreeable to start pravastatin 80 mg daily on 7/28/23 with eventual consideration to switch to a PCSK9 inhibitor   Recommendations: Worsening aphasia; etiology unclear. Possible recrudescence secondary to dehydration/infection/underlying cognitive dysfunction  - Stroke pathway  • MRI brain ordered  • Echo   • Hemoglobin A1c pending  • Continue home Aspirin 81 mg daily and brilinta 90mg bid   • Continue home Pravastatin 80 mg daily  • Normotension, avoid hypotension  • Euglycemic, normothermic goal  • Continue telemetry  • PT/OT/ST  • Stroke education  • Continue to monitor and notify neurology with any changes. • STAT CT head for any acute change in neuro exam  - Medical management and correction of any metabolic or infectious disturbances per primary service. Angelito Todd will need follow up in in 6 weeks with neurovascular attending.  She will not require outpatient neurological testing.      Reason for Consult / Principal Problem: "aphasia"  Hx and PE limited by: aphasia  HPI: Dariana Medrano is a 70 y.o. right handed female with DM,HTN, HLD, CKD3, PVD, basilar artery stenosis and recent left PCA/MCA territory stroke on asa/brilinta/statin who presented to the ED 8/15/23 with reported worsening aphasia. Initial /55, . [address] without acute findings. She was admitted for further work-up with neurology consult    Of note, she was recently hospitalized 7/27/23-7/29/23 with speech difficulty, RUE decreased sensation and right hand clumsiness since 7/22/23. CTA head and neck showed severe multivessel stenosis, occlusion versus hypoplastic left P1 segment, and hypodensity involving left occipital and left parietal. MRI brain wo contrast confirmed stroke. Pt on Plavix PTA therefore she was loaded with asa 325mg and brilinta 180mg x1 7/28/23 and home plavix d/cd. Suspected etiology of L MCA/PCA infarcts felt to be symptomatic L ICA disease. Vascular surgery planned for [facility] in 2-4 weeks at that time. Rehab was recommended at the time of discharge, however patient declined. According to her son, prior to her stroke in July she was having headaches and occasional forgetfulness. Following her stroke her headaches resolved and she had some improvement of her right sided visual deficit, rue and rle motor deficit, and difficulty with her speech. Three days ago she developed worsening word finding difficulty with no new weakness. Her BP has been running high and he reports Increased stressors.      Inpatient consult to Neurology  Consult performed by: GRACIELA Wagoner  Consult ordered by: Gisell Linn MD        Review of Systems   See HPI    Historical Information   Past Medical History:   Diagnosis Date   • Arthritis    • Chronic kidney disease    • Endometriosis    • High cholesterol    • Hypertension    • Kidney disease, chronic, stage III (moderate, EGFR 30-59 ml/min) ([facility])    • Lumbar disc herniation    • Neuropathy    • Peripheral vascular disease ([address] [facility])    • Pneumonia    • Shoulder injury     left   • Spinal stenosis    • Stroke [facility]) 2015    Memory loss     Past Surgical History:   Procedure Laterality Date   • CARDIAC CATHETERIZATION     • COLONOSCOPY     • FL RETROGRADE PYELOGRAM  4/6/2020   • FRACTURE SURGERY Right     ankle   • OVARIAN CYST SURGERY     • CO CYSTO BLADDER W/URETERAL CATHETERIZATION Bilateral 4/6/2020    Procedure: CYSTOSCOPY WITH RETROGRADE PYELOGRAM;  Surgeon: Umang Llanos MD;  [facility];  Service: Urology   • CO CYSTO W/INSERT URETERAL STENT Right 4/6/2020    Procedure: INSERTION STENT URETERAL;  Surgeon: Umang Llanos MD;  [facility];  Service: Urology   • CO CYSTO W/REMOVAL OF TUMORS SMALL N/A 4/6/2020    Procedure: TRANSURETHRAL RESECTION OF BLADDER TUMOR (TURBT); Surgeon: Umang Llanos MD;  [facility];  Service: Urology   • ROTATOR CUFF REPAIR Left    • TONSILLECTOMY       Social History   Social History     Substance and Sexual Activity   Alcohol Use Never     Social History     Substance and Sexual Activity   Drug Use Never     E-Cigarette/Vaping   • E-Cigarette Use Never User      E-Cigarette/Vaping Substances   • Nicotine No    • THC No    • CBD No    • Flavoring No    • Other No    • Unknown No      Social History     Tobacco Use   Smoking Status Former   • Packs/day: 2.00   • Years: 40.00   • Total pack years: 80.00   • Types: Cigarettes   • Start date: 1966   • Quit date: 7/27/2020   • Years since quitting: 3.0   Smokeless Tobacco Never     Family History:   Family History   Problem Relation Age of Onset   • Hypertension Mother    • Heart disease Mother         Valvular   • Hyperlipidemia Mother    • Hypertension Father    • Heart defect Father         Cardiomegaly   • Stroke Sister         Cerebrovascular Accident   • Arthritis Brother    • Other Brother         Back Disorder       Review of previous medical records was completed.      Meds/Allergies   current meds: Current Facility-Administered Medications   Medication Dose Route Frequency   • allopurinol (ZYLOPRIM) tablet 100 mg  100 mg Oral Daily   • amLODIPine (NORVASC) tablet 10 mg  10 mg Oral Daily   • aspirin chewable tablet 81 mg  81 mg Oral Daily   • cinacalcet (SENSIPAR) tablet 30 mg  30 mg Oral Every Other Day   • cloNIDine (CATAPRES-TTS-3) 0.3 mg/24 hr TD weekly patch  1 patch Transdermal Weekly   • fish oil capsule 1,000 mg  1,000 mg Oral Daily   • heparin (porcine) subcutaneous injection 5,000 Units  5,000 Units Subcutaneous Q8H [address]   • hydrALAZINE (APRESOLINE) injection 5 mg  5 mg Intravenous Q6H PRN   • insulin lispro (HumaLOG) 100 units/mL subcutaneous injection 1-5 Units  1-5 Units Subcutaneous TID AC   • insulin lispro (HumaLOG) 100 units/mL subcutaneous injection 1-5 Units  1-5 Units Subcutaneous HS   • labetalol (NORMODYNE) tablet 300 mg  300 mg Oral BID   • pravastatin (PRAVACHOL) tablet 80 mg  80 mg Oral Daily With Dinner   • ticagrelor (BRILINTA) tablet 90 mg  90 mg Oral Q12H [address]    and PTA meds:   Prior to Admission Medications   Prescriptions Last Dose Informant Patient Reported? Taking? Alcohol Swabs 70 % PADS Unknown Self, Child No No   Sig: May substitute brand based on insurance coverage. Check glucose ACHS. Blood Glucose Monitoring Suppl (OneTouch Verio Reflect) w/Device KIT Unknown Self, Child No No   Sig: May substitute brand based on insurance coverage. Check glucose ACHS. Omega-3 Fatty Acids (fish oil) 1,000 mg 8/15/2023 Self, Child No Yes   Sig: Take 1 capsule (1,000 mg total) by mouth daily Do not start before July 30, 2023. OneTouch Delica Lancets 11F MISC Unknown Self, Child No No   Sig: May substitute brand based on insurance coverage. Check glucose ACHS.    allopurinol (ZYLOPRIM) 100 mg tablet 8/15/2023 Self, Child No Yes   Sig: TAKE 1 TABLET BY MOUTH EVERY DAY   amLODIPine (NORVASC) 10 mg tablet 8/15/2023 Self, Child No Yes   Sig: Take 1 tablet (10 mg total) by mouth daily aspirin 81 mg chewable tablet 8/15/2023 Self, Child No Yes   Sig: Chew 1 tablet (81 mg total) daily Do not start before July 30, 2023. cinacalcet (SENSIPAR) 30 mg tablet 8/15/2023  No Yes   Sig: Take 1 tablet (30 mg total) by mouth every other day   cloNIDine (CATAPRES-TTS-3) 0.3 mg/24 hr 8/15/2023 Self, Child No Yes   Sig: PLACE 1 PATCH (0.3 MG TOTAL) ON THE SKIN ONCE A WEEK   glipiZIDE (GLUCOTROL) 5 mg tablet Unknown  No No   Sig: Take 1 tablet (5 mg total) by mouth daily with breakfast   glucose blood (OneTouch Verio) test strip Unknown Self, Child No No   Sig: May substitute brand based on insurance coverage. Check glucose ACHS. labetalol (NORMODYNE) 300 mg tablet 8/15/2023 Self, Child No Yes   Sig: Take 1 tablet (300 mg total) by mouth 2 (two) times a day   losartan (COZAAR) 100 MG tablet Unknown Self, Child No No   Sig: Take 1 tablet (100 mg total) by mouth daily   pravastatin (PRAVACHOL) 80 mg tablet 8/15/2023 Self, Child No Yes   Sig: Take 1 tablet (80 mg total) by mouth daily with dinner for 14 days   ticagrelor (BRILINTA) 90 MG 8/14/2023 Self, Child No Yes   Sig: Take 1 tablet (90 mg total) by mouth every 12 (twelve) hours   zinc oxide 20 % ointment Unknown Self, Child No No   Sig: Apply topically as needed for irritation      Facility-Administered Medications: None       Allergies   Allergen Reactions   • Fenofibrate Other (See Comments)      blood in urine  hx  Kidney Failure   • Colesevelam Other (See Comments)      leg pains   • Colestipol Itching and Other (See Comments)      Swelling lower legs   • Ezetimibe GI Intolerance   • Statins Myalgia       Objective   Vitals:Blood pressure 134/70, pulse 85, temperature 98 °F (36.7 °C), temperature source Oral, resp. rate 18, height 4' 10" (1.473 m), weight 72.6 kg (160 lb), SpO2 96 %. ,Body mass index is 33.44 kg/m². No intake or output data in the 24 hours ending 08/16/23 1232    Invasive Devices:    Invasive Devices     Peripheral Intravenous Line Duration           Peripheral IV 08/15/23 Left Antecubital 1 day                Physical Exam  Vitals reviewed. Constitutional:       General: She is in acute distress. Eyes:      Extraocular Movements: EOM normal.      Pupils: Pupils are equal, round, and reactive to light. Pulmonary:      Effort: Pulmonary effort is normal.   Skin:     General: Skin is warm and dry. Neurological:      Mental Status: She is alert. Coordination: Finger-Nose-Finger Test normal.      Deep Tendon Reflexes:      Reflex Scores:       Bicep reflexes are 2+ on the right side and 2+ on the left side. Patellar reflexes are 1+ on the right side and 1+ on the left side. Psychiatric:         Speech: Speech normal.       Neurologic Exam     Mental Status   Follows 2 step commands. Speech: speech is normal   Level of consciousness: alert  Able to name object. Able to repeat. Awake and alert. Confused a little bit. Disoriented to date and time of day; stated thurs afternoon, not Wednesday am. She is oriented to person and place; was able to name her son and his age. Trouble with word finding and recall. Unable to name 10 fruits, "I can hear them in my  head but can't say them"  Speech is clear and  Fluent with some perseveration       Cranial Nerves     CN III, IV, VI   Pupils are equal, round, and reactive to light.   Extraocular motions are normal.   CN III: no CN III palsy  CN VI: no CN VI palsy  Nystagmus: none   Conjugate gaze: present    CN V   Right facial sensation deficit: none  Left facial sensation deficit: none    CN VII   Right facial weakness: none  Left facial weakness: none    CN XII   Tongue deviation: none  Right homonomous hemianopsia     Motor Exam RUE pronation 5-/5  LUE 5/5  BLE 5/5     Sensory Exam   Chronic BLE paresthesias appear uncchanged     Gait, Coordination, and Reflexes     Coordination   Finger to nose coordination: normal    Reflexes   Right biceps: 2+  Left biceps: 2+  Right patellar: 1+  Left patellar: 1+  Right plantar: normal  Left plantar: normal      Lab Results:   CBC:   Results from last 7 days   Lab Units 08/15/23  1557 08/15/23  1126   WBC Thousand/uL  --  8.98   RBC Million/uL  --  4.66   HEMOGLOBIN g/dL  --  14.4   HEMATOCRIT %  --  40.8   MCV fL  --  88   PLATELETS Thousands/uL 242 286   , BMP/CMP:   Results from last 7 days   Lab Units 08/15/23  1126   SODIUM mmol/L 138   POTASSIUM mmol/L 3.6   CHLORIDE mmol/L 106   CO2 mmol/L 21   BUN mg/dL 26*   CREATININE mg/dL 1.89*   CALCIUM mg/dL 9.8   AST U/L 14   ALT U/L 15   ALK PHOS U/L 87   EGFR ml/min/1.73sq m 25   , Vitamin B12:   , HgBA1C:   , TSH:   , Coagulation:   , Lipid Profile:   Results from last 7 days   Lab Units 08/16/23  0544   HDL mg/dL 45*   LDL CALC mg/dL 79   TRIGLYCERIDES mg/dL 378*   , Ammonia:   , Urinalysis:       Invalid input(s): "URIBILINOGEN", Drug Screen:   , Medication Drug Levels:       Invalid input(s): "CARBAMAZEPINE", "LACOSAMIDE", "OXCARBAZEPINE"     Imaging Studies: I have personally reviewed pertinent reports. and I have personally reviewed pertinent films in PACS     EKG, Pathology, and Other Studies: I have personally reviewed pertinent reports. Code Status: Level 1 - Full Code    Counseling / Coordination of Care  Assessment performed by Dr. Davon Sheth. Images and plan reviewed with Dr. Davon Sheth.  Plan discussed with patient, son at bedside and primary service

## 2023-08-15 NOTE — TELEPHONE ENCOUNTER
Reviewed. Patient has high grade bilateral ICA stenosis putting her at high risk for stroke. If there are concerns for change in symptoms and possible CVA, her best course of action would be to go to the ED for further work up and potentially move up date of surgery, if warranted.

## 2023-08-15 NOTE — QUICK NOTE
I updated the son over the phone. The patient was doing okay for a week or 2 after discharge for the past 4 to 5 days she is being coming increasingly confused and not as coherent. Patient initially was trying to take care of herself when she got home knowing that she had a large stroke and was try to get better but now she just keeps saying that her right side is not good but she is not a great historian at this time so this is a big change. Son was also concerned that she was supposed to get a cardiac work-up this week for the carotid stenosis but given that the patient has some worsening symptoms we may need to have vascular surgery see the patient sooner than later for operative management.   If they do want to pursue earlier intervention then we we will get cardiology to see the patient inpatient

## 2023-08-16 ENCOUNTER — TELEPHONE (OUTPATIENT)
Dept: HEMATOLOGY ONCOLOGY | Facility: CLINIC | Age: 78
End: 2023-08-16

## 2023-08-16 ENCOUNTER — APPOINTMENT (INPATIENT)
Dept: MRI IMAGING | Facility: HOSPITAL | Age: 78
DRG: 065 | End: 2023-08-16
Payer: COMMERCIAL

## 2023-08-16 ENCOUNTER — TELEPHONE (OUTPATIENT)
Dept: INTERNAL MEDICINE CLINIC | Facility: CLINIC | Age: 78
End: 2023-08-16

## 2023-08-16 PROBLEM — I69.320 APHASIA AS LATE EFFECT OF CEREBROVASCULAR ACCIDENT (CVA): Status: ACTIVE | Noted: 2023-08-16

## 2023-08-16 LAB
ATRIAL RATE: 69 BPM
CHOLEST SERPL-MCNC: 200 MG/DL
EST. AVERAGE GLUCOSE BLD GHB EST-MCNC: 197 MG/DL
GLUCOSE SERPL-MCNC: 138 MG/DL (ref 65–140)
GLUCOSE SERPL-MCNC: 184 MG/DL (ref 65–140)
GLUCOSE SERPL-MCNC: 205 MG/DL (ref 65–140)
GLUCOSE SERPL-MCNC: 258 MG/DL (ref 65–140)
HBA1C MFR BLD: 8.5 %
HDLC SERPL-MCNC: 45 MG/DL
LDLC SERPL CALC-MCNC: 79 MG/DL (ref 0–100)
P AXIS: 43 DEGREES
PR INTERVAL: 220 MS
QRS AXIS: -3 DEGREES
QRSD INTERVAL: 92 MS
QT INTERVAL: 416 MS
QTC INTERVAL: 445 MS
T WAVE AXIS: 125 DEGREES
TRIGL SERPL-MCNC: 378 MG/DL
VENTRICULAR RATE: 69 BPM

## 2023-08-16 PROCEDURE — 97167 OT EVAL HIGH COMPLEX 60 MIN: CPT

## 2023-08-16 PROCEDURE — 92523 SPEECH SOUND LANG COMPREHEN: CPT

## 2023-08-16 PROCEDURE — 99233 SBSQ HOSP IP/OBS HIGH 50: CPT | Performed by: INTERNAL MEDICINE

## 2023-08-16 PROCEDURE — 97163 PT EVAL HIGH COMPLEX 45 MIN: CPT

## 2023-08-16 PROCEDURE — 83036 HEMOGLOBIN GLYCOSYLATED A1C: CPT | Performed by: FAMILY MEDICINE

## 2023-08-16 PROCEDURE — 99223 1ST HOSP IP/OBS HIGH 75: CPT | Performed by: SURGERY

## 2023-08-16 PROCEDURE — 70551 MRI BRAIN STEM W/O DYE: CPT

## 2023-08-16 PROCEDURE — 82948 REAGENT STRIP/BLOOD GLUCOSE: CPT

## 2023-08-16 PROCEDURE — 80061 LIPID PANEL: CPT | Performed by: FAMILY MEDICINE

## 2023-08-16 PROCEDURE — 93010 ELECTROCARDIOGRAM REPORT: CPT | Performed by: INTERNAL MEDICINE

## 2023-08-16 PROCEDURE — 99222 1ST HOSP IP/OBS MODERATE 55: CPT | Performed by: PHYSICAL MEDICINE & REHABILITATION

## 2023-08-16 PROCEDURE — 99222 1ST HOSP IP/OBS MODERATE 55: CPT | Performed by: PSYCHIATRY & NEUROLOGY

## 2023-08-16 PROCEDURE — 97110 THERAPEUTIC EXERCISES: CPT

## 2023-08-16 RX ADMIN — PRAVASTATIN SODIUM 80 MG: 80 TABLET ORAL at 17:30

## 2023-08-16 RX ADMIN — INSULIN LISPRO 1 UNITS: 100 INJECTION, SOLUTION INTRAVENOUS; SUBCUTANEOUS at 12:00

## 2023-08-16 RX ADMIN — HEPARIN SODIUM 5000 UNITS: 5000 INJECTION INTRAVENOUS; SUBCUTANEOUS at 05:03

## 2023-08-16 RX ADMIN — INSULIN LISPRO 1 UNITS: 100 INJECTION, SOLUTION INTRAVENOUS; SUBCUTANEOUS at 08:00

## 2023-08-16 RX ADMIN — TICAGRELOR 90 MG: 90 TABLET ORAL at 22:06

## 2023-08-16 RX ADMIN — LABETALOL HYDROCHLORIDE 300 MG: 100 TABLET, FILM COATED ORAL at 09:37

## 2023-08-16 RX ADMIN — TICAGRELOR 90 MG: 90 TABLET ORAL at 09:37

## 2023-08-16 RX ADMIN — AMLODIPINE BESYLATE 10 MG: 10 TABLET ORAL at 09:37

## 2023-08-16 RX ADMIN — ALLOPURINOL 100 MG: 100 TABLET ORAL at 09:37

## 2023-08-16 RX ADMIN — ASPIRIN 81 MG: 81 TABLET, CHEWABLE ORAL at 09:37

## 2023-08-16 RX ADMIN — HEPARIN SODIUM 5000 UNITS: 5000 INJECTION INTRAVENOUS; SUBCUTANEOUS at 22:06

## 2023-08-16 RX ADMIN — INSULIN LISPRO 2 UNITS: 100 INJECTION, SOLUTION INTRAVENOUS; SUBCUTANEOUS at 17:00

## 2023-08-16 RX ADMIN — LABETALOL HYDROCHLORIDE 300 MG: 100 TABLET, FILM COATED ORAL at 17:31

## 2023-08-16 RX ADMIN — OMEGA-3 FATTY ACIDS CAP 1000 MG 1000 MG: 1000 CAP at 09:37

## 2023-08-16 RX ADMIN — HEPARIN SODIUM 5000 UNITS: 5000 INJECTION INTRAVENOUS; SUBCUTANEOUS at 15:00

## 2023-08-16 NOTE — PHYSICAL THERAPY NOTE
Physical Therapy Evaluation     Patient's Name: Luis Waggoner    Admitting Diagnosis  Aphasia [R47.01]  Dysarthria [R47.1]  Stroke-like symptoms [R29.90]  Type 2 diabetes mellitus with diabetic peripheral angiopathy without gangrene, without long-term current use of insulin ([address]) [E11.51]    Problem List  Patient Active Problem List   Diagnosis    Basilar artery stenosis    CVA (cerebral vascular accident) ([address])    Chronic GERD    Claudication in peripheral vascular disease ([address])    Type 2 diabetes mellitus with diabetic nephropathy ([address])    Gout    Hx-TIA (transient ischemic attack)    Hypercholesteremia    Hyperlipidemia associated with type 2 diabetes mellitus ([address])    Hypertension    Hypertension associated with diabetes ([address])    Osteoarthritis    Obesity (BMI 30-39. 9)    Polyneuropathy    Right renal artery stenosis (HCC)    Vertebrobasilar artery syndrome    Vitamin D deficiency    Statin intolerance    Lumbar disc herniation    Spondylosis of lumbar region without myelopathy or radiculopathy    Aortoiliac stenosis, left (HCC)    Lumbosacral spondylosis without myelopathy    Hyperlipidemia, unspecified    Herniated lumbar intervertebral disc    Atherosclerosis of renal artery (HCC)    Celiac artery stenosis (HCC) >70% stenosis in the celiac trunk    Abnormal CT of the chest suspicious for an infectious or inflammatory bronchiolitis. Positive cardiac stress test  small, mildly severe, partially reversible myocardial perfusion defect of anterior and inferior wall     Femoral artery stenosis, right (HCC) 50-75% stenosis in the common femoral artery.     Mesenteric artery stenosis (HCC)    Median arcuate ligament syndrome (HCC)    Atherosclerosis of native arteries of extremities with intermittent claudication, bilateral legs (HCC)    Symptomatic carotid artery stenosis, left    Pulmonary nodule 7 mm groundglass opacity in the right upper lobe, unchanged since October 2019    Stenosis of left anterior descending artery Mid LAD 50-60% stenosis    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    Malignant neoplasm of overlapping sites of bladder (HCC)    Cervical radiculopathy    Stage 4 chronic kidney disease (HCC)    Hypertensive kidney disease with stage 4 chronic kidney disease (HCC)    Embolism and 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)    Depression, recurrent (HCC)    Obesity, morbid (HCC)    Diabetes ([address])    Type 2 diabetes mellitus with diabetic peripheral angiopathy without gangrene ([address])    Gastrointestinal stromal tumor (GIST) ([address])    History of gastrointestinal stromal tumor (GIST)    Secondary hyperparathyroidism of renal origin ([address])    Aortoiliac occlusive disease ([address])    Hypertensive urgency    Dysarthria    Chronic kidney disease     Past Medical History  Past Medical History:   Diagnosis Date    Arthritis     Chronic kidney disease     Endometriosis     High cholesterol     Hypertension     Kidney disease, chronic, stage III (moderate, EGFR 30-59 ml/min) (HCC)     Lumbar disc herniation     Neuropathy     Peripheral vascular disease (HCC)     Pneumonia     Shoulder injury     left    Spinal stenosis     Stroke ([address]) 2015    Memory loss     Past Surgical History  Past Surgical History:   Procedure Laterality Date    CARDIAC CATHETERIZATION      COLONOSCOPY      FL RETROGRADE PYELOGRAM  4/6/2020    FRACTURE SURGERY Right     ankle    OVARIAN CYST SURGERY      OK CYSTO BLADDER W/URETERAL CATHETERIZATION Bilateral 4/6/2020    Procedure: CYSTOSCOPY WITH RETROGRADE PYELOGRAM;  Surgeon: Arizona Lombard, MD;  [facility];  Service: Urology    OK CYSTO W/INSERT URETERAL STENT Right 4/6/2020    Procedure: INSERTION STENT URETERAL;  Surgeon: Arizona Lombard, MD;  [facility];  Service: Urology    OK CYSTO W/REMOVAL OF TUMORS SMALL N/A 4/6/2020    Procedure: TRANSURETHRAL RESECTION OF BLADDER TUMOR (TURBT);   Surgeon: Elena Jorge MD Stephen;  [facility];  Service: Urology    ROTATOR CUFF REPAIR Left     TONSILLECTOMY        08/16/23 0839   PT Last Visit   PT Visit Date 08/16/23   Note Type   Note type Evaluation and Treatment   Pain Assessment   Pain Assessment Tool 0-10   Pain Score No Pain   Restrictions/Precautions   Weight Bearing Precautions Per Order No   Other Precautions Chair Alarm; Bed Alarm;Cognitive;Telemetry; Fall Risk   Home Living   Type of [address] [place] [address] One level; Able to live on main level with bedroom/bathroom; Performs ADLs on one level;Ramped entrance   Bathroom Shower/Tub Walk-in shower   Bathroom Toilet Standard   Bathroom Equipment Built-in shower seat;Commode   [address] Walker;Cane;Wheelchair-manual   Additional Comments Pt ambulates with a cane. Prior Function   Level of [place] Independent with functional mobility; Independent with ADLs; Needs assistance with IADLS  (per patient and her son; pt has been requiring increased assistance at home)   Lives With Stef Pancake Help From Family   IADLs Family/Friend/Other provides transportation; Family/Friend/Other provides meals; Family/Friend/Other provides medication management   Falls in the last 6 months 0   Vocational Retired   General   Family/Caregiver Present Yes  (pt's son arrived during session)   Cognition   Overall Cognitive Status Impaired   Arousal/Participation Alert   Attention Attends with cues to redirect   Orientation Level Oriented to person;Oriented to place; Disoriented to time;Disoriented to situation  (oriented to year; not month)   Memory Decreased recall of recent events;Decreased short term memory   Following Commands Follows one step commands with increased time or repetition   Comments Pt agreeable to PT.    Subjective   Subjective "I'm weaker."   RLE Assessment   RLE Assessment X   Strength RLE   RLE Overall Strength 4-/5   LLE Assessment   LLE Assessment X   Strength LLE   LLE Overall Strength 4-/5   Light Touch   RLE Light Touch Grossly intact   LLE Light Touch Grossly intact   Bed Mobility   Additional Comments Pt was received seated at EOB in NAD. Transfers   Sit to Stand 4  Minimal assistance   Additional items Assist x 1; Increased time required;Verbal cues   Stand to Sit 4  Minimal assistance   Additional items Assist x 1; Armrests; Increased time required;Verbal cues   Ambulation/Elevation   Gait pattern Decreased toe off;Decreased heel strike;Decreased hip extension; Excessively slow; Short stride; Shuffling   Gait Assistance 4  Minimal assist   Additional items Assist x 1;Verbal cues   Assistive Device Rolling walker   Distance 15 feet   Balance   Static Sitting Fair +   Dynamic Sitting Fair   Static Standing Fair -   Dynamic Standing Poor +   Ambulatory Poor +   Endurance Deficit   Endurance Deficit Yes   Endurance Deficit Description decreased activity tolerance   Activity Tolerance   Activity Tolerance Patient tolerated treatment well   Medical Staff Made Aware OT Cynthia Duque  (Co-evaluation performed with OT secondary to complex medical condition of patient and regression of functional status from baseline. PT/OT goals were addressed separately.)   Nurse Made Aware RN aware   Assessment   Prognosis Good   Problem List Decreased strength;Decreased endurance; Impaired balance;Decreased mobility; Decreased cognition   Assessment Pt is 70year old female seen for PT evaluation s/p admit to [id number] [facility] on 8/15/2023 with Dysarthria. PT consulted to assess pt's functional mobility and discharge needs. Order placed for PT evaluation and treatment, with up and out of bed as tolerated order. Comorbidities affecting pt's physical performance at time of assessment include basilar artery stenosis, CVA, chronic GERD, claudication in peripheral vascular disease, hypertension, and CKD. Prior to hospitalization, pt was independent with all functional mobility with a cane.  Pt ambulates unrestricted distances on all terrain and elevations. Pt resides with her son, in a one level house with a ramped entrance. Personal factors affecting pt at time of initial evaluation include ambulating with an assistive device, inability to ambulate community distances, inability to navigate level surfaces without external assistance, unable to perform dynamic tasks in the community, inability to live alone, difficulty performing ADLs, and inability to perform IADLs. Please find objective findings from PT assessment regarding body systems outlined above with impairments and limitations including weakness, impaired balance, decreased endurance, gait deviations, decreased activity tolerance, decreased functional mobility tolerance, fall risk, and decreased cognition. The following objective measures were performed on initial evaluation Barthel Index: 50/100, Modified [place]: 4 (moderate/severe disability) and AM-PAC 6-Clicks: 16/24. Pt's clinical presentation is currently unstable/unpredictable seen in pt's presentation of need for ongoing medical management/monitoring, pt is a fall risk, pt requires use of RW for safe ambulation, and pt requires cues and assist for safety with functional mobility. Pt to benefit from continued PT treatment to address deficits as defined above and maximize pt's level of function and independence with mobility. From a PT standpoint, recommendation at time of discharge would be STR pending pt's progress in order to facilitate return to prior level of function.    Barriers to Discharge Other (Comment)  (decline in functional mobility)   Goals   STG Expiration Date 08/26/23   Short Term Goal #1 In 10 days: Increase bilateral LE strength 1/2 grade to facilitate independent mobility, Perform all bed mobility tasks with close supervision to decrease caregiver burden, Perform all transfers with close supervision to improve independence, Ambulate > 150 ft. with least restrictive assistive device with close supervision w/o LOB and w/ normalized gait pattern 100% of the time and Increase all balance 1/2 grade to decrease risk for falls   PT Treatment Day 1   Plan   Treatment/Interventions Functional transfer training;LE strengthening/ROM; Therapeutic exercise; Endurance training;Cognitive reorientation;Patient/family training;Bed mobility;Gait training;Spoke to nursing;OT;Family   PT Frequency 3-5x/wk   Recommendation   PT Discharge Recommendation Post acute rehabilitation services   AM-PAC Basic Mobility Inpatient   Turning in Flat Bed Without Bedrails 3   Lying on Back to Sitting on Edge of Flat Bed Without Bedrails 3   Moving Bed to Chair 3   Standing Up From Chair Using Arms 3   Walk in Room 3   Climb 3-5 Stairs With Railing 1   Basic Mobility Inpatient Raw Score 16   Basic Mobility Standardized Score 38.32   Highest Level Of Mobility   -[facility] Goal 5: Stand one or more mins   -HLM Achieved 6: Walk 10 steps or more   Modified [place] Scale   Modified [place] Scale 4   Barthel Index   Feeding 10   Bathing 0   Grooming Score 0   Dressing Score 5   Bladder Score 10   Bowels Score 10   Toilet Use Score 5   Transfers (Bed/Chair) Score 10   Mobility (Level Surface) Score 0   Stairs Score 0   Barthel Index Score 50   Additional Treatment Session   Start Time 0299   End Time 0839   Treatment Assessment Pt agreeable to PT treatment session following PT evaluation. Pt performed seated therapeutic exercise as indicated below. Pt required verbal cues and occasional tactile cues for correct technique and form. Pt tolerated therapeutic exercise well without complaints of pain. Pt continues to exhibit decreased lower extremity strength, impaired balance, decreased endurance, gait deviations, and decreased functional mobility. PT to continue to recommend STR. PT to continue to follow and treat as appropriate. Exercises   Hip Flexion Sitting;20 reps;AROM; Bilateral   Hip Abduction Sitting;20 reps;AROM; Bilateral  (long sitting)   Knee AROM Long Arc Quad Sitting;20 reps;AROM; Bilateral   Ankle Pumps Sitting;20 reps;AROM; Bilateral   End of Consult   Patient Position at End of Consult Bedside chair;Bed/Chair alarm activated; All needs within reach     PT Evaluation Time: 0813-0828    PT Treatment Time: 5417-5312  10 minutes    Vu Hernandez, PT, DPT

## 2023-08-16 NOTE — ASSESSMENT & PLAN NOTE
70year old female, former smoker, admitted 8/15/23 for expressive aphasia and persistent right-sided weakness. Patient was previously admitted 7/27/23 for similar symptoms and was found to have L MCA territory infarct, as well as acute to subacute ischemia of left PCA distribution. Evaluation at that time revealed severe bilateral carotid artery stenosis (Estimated 85-90% left and 80% right proximal ICA stenosis). Vascular surgery recommended DAPT, aggressive management of dyslipidemia with PCSK9 inhibitors, and intervention in 4 weeks. PCSK9 inhibitors unavailable, therefore patient was initiated on high intensity pravastatin. Neurology is following and believes worsening symptoms to be related to recrudescence of previous infarcts. Imaging:  CT head wo contrast 8/15/23:  Evolving subacute left nonhemorrhagic posterior cerebral artery distribution infarction    MRI brain wo contrast 8/16/23  Acute punctate infarcts in the left occipital lobe superimposed on the previously demonstrated subacute to early chronic infarct. Punctate acute infarcts in the left MCA territory. Plan:  -Patient will benefit from L TCAR. Tentatively scheduled for 8/28/23.  -Cardiology consult pending, awaiting cardiac risk stratification and recommendations.  -Continue medical optimization with aspirin, ticagrelor, and pravastatin. -RICARDO on admission, improving  -Continue with clinical monitoring  -Appreciate neurology input   -PT/OT/speech; Case management coordinating disposition to acute rehab facility.  -Continue management of multiple comorbidities per SLIM  -Will d/w Dr. Cayetano Norris.

## 2023-08-16 NOTE — PROGRESS NOTES
[address]  Progress Note  Name: Neetu Patiño  MRN: [id number]  Unit/Bed#: -01 I Date of Admission: 8/15/2023   Date of Service: 8/16/2023 I Hospital Day: 0    Assessment/Plan   * Dysarthria  Assessment & Plan  · Patient with worsening dysarthria with recent large stroke. · Potentially just evolution of her previous stroke  · Recent CTA done shows, among other lesions, Advanced atherosclerotic change of bilateral cervical carotid arteries. Estimated 85-90% left and 80% right proximal ICA stenosis. · Continue aspirin and Brilinta for now  · PT/OT/SLP  · Vascular surgery consult - may need TCAR, timing to be determined, will need cardiac clearance as per vascular so will consult cardiology  · Monitor neurological exam      CVA (cerebral vascular accident) [facility])  Assessment & Plan  · Continue aspirin and Brilinta for now  · Neurology on board, input will be appreciated, MRI pending    Chronic kidney disease  Assessment & Plan  Lab Results   Component Value Date    EGFR 25 08/15/2023    EGFR 26 08/03/2023    EGFR 36 07/28/2023    CREATININE 1.89 (H) 08/15/2023    CREATININE 1.80 (H) 08/03/2023    CREATININE 1.41 (H) 07/28/2023   · Stable. · The patient has been anywhere from 1.4-1.8  · Monitor and avoid nephrotoxins    Hypertension  Assessment & Plan  · Avoid hypotension    Claudication in peripheral vascular disease (HCC)  Assessment & Plan  · Continue home medications    Chronic GERD  Assessment & Plan  · Continue PPI    Basilar artery stenosis  Assessment & Plan  · Continue antithrombotics           VTE Pharmacologic Prophylaxis:   Pharmacologic: Heparin  Mechanical VTE Prophylaxis in Place: Yes    Patient Centered Rounds: I have performed bedside rounds with nursing staff today.     Discussions with Specialists or Other Care Team Provider: Discussed with care management team    Education and Discussions with Family / Patient: Patient, family/son at the bedside    Time Spent for Care: 1 hour. More than 50% of total time spent on counseling and coordination of care as described above. Current Length of Stay: 0 day(s)    Current Patient Status: Observation   Certification Statement: The patient will continue to require additional inpatient hospital stay due to need for neurological workup    Discharge Plan: Once stable - 24-48h    Code Status: Level 1 - Full Code      Subjective:     Patient related this morning. She does mention still having difficulties with speech although her comprehension is preserved, she does appear to have some degree of dysarthria. Objective:     Vitals:   Temp (24hrs), Av.9 °F (36.6 °C), Min:97.5 °F (36.4 °C), Max:98.1 °F (36.7 °C)    Temp:  [97.5 °F (36.4 °C)-98.1 °F (36.7 °C)] 98 °F (36.7 °C)  HR:  [59-85] 85  Resp:  [14-18] 18  BP: (134-189)/(55-77) 134/70  SpO2:  [96 %-100 %] 96 %  Body mass index is 33.44 kg/m². Input and Output Summary (last 24 hours):     No intake or output data in the 24 hours ending 23 1220    Physical Exam:     Physical Exam  Vitals and nursing note reviewed. Constitutional:       Appearance: Normal appearance. She is normal weight. HENT:      Head: Normocephalic and atraumatic. Right Ear: External ear normal.      Left Ear: External ear normal.      Nose: Nose normal. No congestion. Mouth/Throat:      Mouth: Mucous membranes are moist.      Pharynx: Oropharynx is clear. No oropharyngeal exudate or posterior oropharyngeal erythema. Eyes:      General: No scleral icterus. Right eye: No discharge. Left eye: No discharge. Extraocular Movements: Extraocular movements intact. Conjunctiva/sclera: Conjunctivae normal.      Pupils: Pupils are equal, round, and reactive to light. Cardiovascular:      Rate and Rhythm: Normal rate and regular rhythm. Pulses: Normal pulses. Heart sounds: Normal heart sounds. No murmur heard. No friction rub. No gallop.    Pulmonary: Effort: Pulmonary effort is normal. No respiratory distress. Breath sounds: Normal breath sounds. No stridor. No wheezing, rhonchi or rales. Chest:      Chest wall: No tenderness. Abdominal:      General: Abdomen is flat. Bowel sounds are normal. There is no distension. Palpations: Abdomen is soft. There is no mass. Tenderness: There is no abdominal tenderness. There is no guarding or rebound. Musculoskeletal:         General: No swelling, tenderness, deformity or signs of injury. Normal range of motion. Cervical back: Normal range of motion and neck supple. No rigidity. No muscular tenderness. Skin:     General: Skin is warm and dry. Capillary Refill: Capillary refill takes less than 2 seconds. Coloration: Skin is not jaundiced or pale. Findings: No bruising, erythema, lesion or rash. Neurological:      General: No focal deficit present. Mental Status: She is alert and oriented to person, place, and time. Mental status is at baseline. Cranial Nerves: No cranial nerve deficit. Sensory: No sensory deficit. Motor: No weakness. Coordination: Coordination normal.      Comments: Patient with dysarthria on exam but comprehension is preserved  AAOx4, GCS15  Strength 5/5 in upper and lower extremities on my exam  Sensitivity preserved   Psychiatric:         Mood and Affect: Mood normal.         Behavior: Behavior normal.         Thought Content:  Thought content normal.         Judgment: Judgment normal.           Additional Data:     Labs:    Results from last 7 days   Lab Units 08/15/23  1557 08/15/23  1126   WBC Thousand/uL  --  8.98   HEMOGLOBIN g/dL  --  14.4   HEMATOCRIT %  --  40.8   PLATELETS Thousands/uL 242 286   NEUTROS PCT %  --  72   LYMPHS PCT %  --  15   MONOS PCT %  --  7   EOS PCT %  --  4     Results from last 7 days   Lab Units 08/15/23  1126   SODIUM mmol/L 138   POTASSIUM mmol/L 3.6   CHLORIDE mmol/L 106   CO2 mmol/L 21   BUN mg/dL 26*   CREATININE mg/dL 1.89*   ANION GAP mmol/L 11   CALCIUM mg/dL 9.8   ALBUMIN g/dL 4.3   TOTAL BILIRUBIN mg/dL 0.71   ALK PHOS U/L 87   ALT U/L 15   AST U/L 14   GLUCOSE RANDOM mg/dL 267*         Results from last 7 days   Lab Units 08/16/23  1058 08/16/23  0712 08/15/23  2042 08/15/23  1657 08/15/23  1102   POC GLUCOSE mg/dl 205* 184* 138 230* 263*                   * I Have Reviewed All Lab Data Listed Above. * Additional Pertinent Lab Tests Reviewed: [address] [facility] Admission Reviewed      Recent Cultures (last 7 days):           Last 24 Hours Medication List:   Current Facility-Administered Medications   Medication Dose Route Frequency Provider Last Rate   • allopurinol  100 mg Oral Daily Des Fuchs MD     • amLODIPine  10 mg Oral Daily Des Fuchs MD     • aspirin  81 mg Oral Daily Des Fuchs MD     • cinacalcet  30 mg Oral Every Other Day Des Fuchs MD     • cloNIDine  1 patch Transdermal Weekly Des Fuchs MD     • fish oil  1,000 mg Oral Daily Des Fuchs MD     • heparin (porcine)  5,000 Units Subcutaneous Q8H [facility] Des Fuchs MD     • hydrALAZINE  5 mg Intravenous Q6H PRN Des Fuchs MD     • insulin lispro  1-5 Units Subcutaneous TID AC Des Fuchs MD     • insulin lispro  1-5 Units Subcutaneous HS Des Fuchs MD     • labetalol  300 mg Oral BID Des Fuchs MD     • pravastatin  80 mg Oral Daily With Giovanna Fink MD     • ticagrelor  90 mg Oral Q12H Nikole Moore MD          Today, Patient Was Seen By: Forrest Cox MD    ** Please Note: Dictation voice to text software may have been used in the creation of this document.  **

## 2023-08-16 NOTE — PLAN OF CARE
Problem: PHYSICAL THERAPY ADULT  Goal: Performs mobility at highest level of function for planned discharge setting. See evaluation for individualized goals. Description: Treatment/Interventions: Functional transfer training, LE strengthening/ROM, Therapeutic exercise, Endurance training, Cognitive reorientation, Patient/family training, Bed mobility, Gait training, Spoke to nursing, OT, Family          See flowsheet documentation for full assessment, interventions and recommendations. Note: Prognosis: Good  Problem List: Decreased strength, Decreased endurance, Impaired balance, Decreased mobility, Decreased cognition  Assessment: Pt is 70year old female seen for PT evaluation s/p admit to [facility] on 8/15/2023 with Dysarthria. PT consulted to assess pt's functional mobility and discharge needs. Order placed for PT evaluation and treatment, with up and out of bed as tolerated order. Comorbidities affecting pt's physical performance at time of assessment include basilar artery stenosis, CVA, chronic GERD, claudication in peripheral vascular disease, hypertension, and CKD. Prior to hospitalization, pt was independent with all functional mobility with a cane. Pt ambulates unrestricted distances on all terrain and elevations. Pt resides with her son, in a one level house with a ramped entrance. Personal factors affecting pt at time of initial evaluation include ambulating with an assistive device, inability to ambulate community distances, inability to navigate level surfaces without external assistance, unable to perform dynamic tasks in the community, inability to live alone, difficulty performing ADLs, and inability to perform IADLs.  Please find objective findings from PT assessment regarding body systems outlined above with impairments and limitations including weakness, impaired balance, decreased endurance, gait deviations, decreased activity tolerance, decreased functional mobility tolerance, fall risk, and decreased cognition. The following objective measures were performed on initial evaluation Barthel Index: 50/100, Modified [place]: 4 (moderate/severe disability) and AM-PAC 6-Clicks: 16/76. Pt's clinical presentation is currently unstable/unpredictable seen in pt's presentation of need for ongoing medical management/monitoring, pt is a fall risk, pt requires use of RW for safe ambulation, and pt requires cues and assist for safety with functional mobility. Pt to benefit from continued PT treatment to address deficits as defined above and maximize pt's level of function and independence with mobility. From a PT standpoint, recommendation at time of discharge would be STR pending pt's progress in order to facilitate return to prior level of function. Barriers to Discharge: Other (Comment) (decline in functional mobility)     PT Discharge Recommendation: Post acute rehabilitation services    See flowsheet documentation for full assessment.

## 2023-08-16 NOTE — ASSESSMENT & PLAN NOTE
· Continue aspirin and Brilinta for now  · Neurology on board, input will be appreciated, MRI pending

## 2023-08-16 NOTE — PLAN OF CARE
Problem: MOBILITY - ADULT  Goal: Maintain or return to baseline ADL function  Description: INTERVENTIONS:  -  Assess patient's ability to carry out ADLs; assess patient's baseline for ADL function and identify physical deficits which impact ability to perform ADLs (bathing, care of mouth/teeth, toileting, grooming, dressing, etc.)  - Assess/evaluate cause of self-care deficits   - Assess range of motion  - Assess patient's mobility; develop plan if impaired  - Assess patient's need for assistive devices and provide as appropriate  - Encourage maximum independence but intervene and supervise when necessary  - Involve family in performance of ADLs  - Assess for home care needs following discharge   - Consider OT consult to assist with ADL evaluation and planning for discharge  - Provide patient education as appropriate  Outcome: Progressing  Goal: Maintains/Returns to pre admission functional level  Description: INTERVENTIONS:  - Perform BMAT or MOVE assessment daily.   - Set and communicate daily mobility goal to care team and patient/family/caregiver. - Collaborate with rehabilitation services on mobility goals if consulted  - Perform Range of Motion    times a day. - Reposition patient every    hours.   - Dangle patient    times a day  - Stand patient    times a day  - Ambulate patient      times a day  - Out of bed to chair    times a day   - Out of bed for meals    times a day  - Out of bed for toileting  - Record patient progress and toleration of activity level   Outcome: Progressing     Problem: PAIN - ADULT  Goal: Verbalizes/displays adequate comfort level or baseline comfort level  Description: Interventions:  - Encourage patient to monitor pain and request assistance  - Assess pain using appropriate pain scale  - Administer analgesics based on type and severity of pain and evaluate response  - Implement non-pharmacological measures as appropriate and evaluate response  - Consider cultural and social influences on pain and pain management  - Notify physician/advanced practitioner if interventions unsuccessful or patient reports new pain  Outcome: Progressing     Problem: INFECTION - ADULT  Goal: Absence or prevention of progression during hospitalization  Description: INTERVENTIONS:  - Assess and monitor for signs and symptoms of infection  - Monitor lab/diagnostic results  - Monitor all insertion sites, i.e. indwelling lines, tubes, and drains  - Monitor endotracheal if appropriate and nasal secretions for changes in amount and color  - [place] appropriate cooling/warming therapies per order  - Administer medications as ordered  - Instruct and encourage patient and family to use good hand hygiene technique  - Identify and instruct in appropriate isolation precautions for identified infection/condition  Outcome: Progressing  Goal: Absence of fever/infection during neutropenic period  Description: INTERVENTIONS:  - Monitor WBC    Outcome: Progressing     Problem: SAFETY ADULT  Goal: Maintain or return to baseline ADL function  Description: INTERVENTIONS:  -  Assess patient's ability to carry out ADLs; assess patient's baseline for ADL function and identify physical deficits which impact ability to perform ADLs (bathing, care of mouth/teeth, toileting, grooming, dressing, etc.)  - Assess/evaluate cause of self-care deficits   - Assess range of motion  - Assess patient's mobility; develop plan if impaired  - Assess patient's need for assistive devices and provide as appropriate  - Encourage maximum independence but intervene and supervise when necessary  - Involve family in performance of ADLs  - Assess for home care needs following discharge   - Consider OT consult to assist with ADL evaluation and planning for discharge  - Provide patient education as appropriate  Outcome: Progressing  Goal: Maintains/Returns to pre admission functional level  Description: INTERVENTIONS:  - Perform BMAT or MOVE assessment daily.   - Set and communicate daily mobility goal to care team and patient/family/caregiver. - Collaborate with rehabilitation services on mobility goals if consulted  - Perform Range of Motion    times a day. - Reposition patient every    hours.   - Dangle patient    times a day  - Stand patient    times a day  - Ambulate patient    times a day  - Out of bed to chair      times a day   - Out of bed for meals    times a day  - Out of bed for toileting  - Record patient progress and toleration of activity level   Outcome: Progressing  Goal: Patient will remain free of falls  Description: INTERVENTIONS:  - Educate patient/family on patient safety including physical limitations  - Instruct patient to call for assistance with activity   - Consult OT/PT to assist with strengthening/mobility   - Keep Call bell within reach  - Keep bed low and locked with side rails adjusted as appropriate  - Keep care items and personal belongings within reach  - Initiate and maintain comfort rounds  - Make Fall Risk Sign visible to staff  - Offer Toileting every    Hours, in advance of need  - Initiate/Maintain   alarm  - Obtain necessary fall risk management equipment:   - Apply yellow socks and bracelet for high fall risk patients  - Consider moving patient to room near nurses station  Outcome: Progressing     Problem: DISCHARGE PLANNING  Goal: Discharge to home or other facility with appropriate resources  Description: INTERVENTIONS:  - Identify barriers to discharge w/patient and caregiver  - Arrange for needed discharge resources and transportation as appropriate  - Identify discharge learning needs (meds, wound care, etc.)  - Arrange for interpretive services to assist at discharge as needed  - Refer to Case Management Department for coordinating discharge planning if the patient needs post-hospital services based on physician/advanced practitioner order or complex needs related to functional status, cognitive ability, or social support system  Outcome: Progressing     Problem: Knowledge Deficit  Goal: Patient/family/caregiver demonstrates understanding of disease process, treatment plan, medications, and discharge instructions  Description: Complete learning assessment and assess knowledge base.   Interventions:  - Provide teaching at level of understanding  - Provide teaching via preferred learning methods  Outcome: Progressing

## 2023-08-16 NOTE — ASSESSMENT & PLAN NOTE
Lab Results   Component Value Date    EGFR 25 08/15/2023    EGFR 26 08/03/2023    EGFR 36 07/28/2023    CREATININE 1.89 (H) 08/15/2023    CREATININE 1.80 (H) 08/03/2023    CREATININE 1.41 (H) 07/28/2023   · Stable.     · The patient has been anywhere from 1.4-1.8  · Monitor and avoid nephrotoxins

## 2023-08-16 NOTE — CONSULTS
[address]  Progress Note  Name: Alvin Peters  MRN: [id number]  Unit/Bed#: -01 I Date of Admission: 8/15/2023   Date of Service: 8/16/2023 I Hospital Day: 0    Assessment/Plan   CVA (cerebral vascular accident) [facility])  Assessment & Plan  Recent L stroke with speech difficulties and right-sided weakness   Bilateral carotid artery stenosis with plan for L TCAR    -Patient returns with increased weakness and expressive difficulties    -CT brain w/o contrast 8/15/23:  Evolving subacute left nonhemorrhagic posterior cerebral artery distribution infarction. -MRI brain w/o contrast 7/27/23: Large acute to subacute ischemia in the L PCA distribution occipital lobe. Small acute to sub acute L frontal and L parietal/temporal lobes of MCA distribution.     -Carotid duplex 7/28/23: B DAVID > 70% (R 301/88, ratio 4.9; L 571/152, ratio 8.68)    -BUN/creat 26/1.89 which is a bit higher than baseline    Plan:  -She may benefit from L TCAR when medically stable; will await neurology work up and evaluation and recommendations on timing for TCAR  -Reviewed recent imaging  -May need additional imaging - ? Repeat MRI, defer to neurology  -Continue with clinical monitoring  -On examination, she has moderate expressive difficulties  -Waiting for neurology consult   -PT/OT/speech; may benefit from rehab of which she is now agreeable  -Continue with DAPT (aspirn/ticagrelo), fish oil and statin therapy  -Continue to optimize medical therapy per SLIM  -Follow up A1c  -Consider cardiology consult for cardiac risk assessment for vascular surgery  -Will review with Dr. Garett Wilson and further rec's forthcoming         Additional diagnoses:  Diabetes, uncontrolled  Hypercholesterolemia, uncontrolled  Chronic kidney disease IV  GI stromal tumor      Requesting Service: SLIM    Reason for consultation: Recent stroke and carotid artery disease    HPI: Milly Michael is a 70 y.o. female with hypertension, hyperlipidemia, CKD, aortoiliac atherosclerotic occlusive disease, renal artery stenosis, peripheral arterial disease and carotid artery disease who recently suffered left occipital stroke with right hand weakness and some speech difficulties. She was seen in the hospital 7/27 where imagining shows L acute to subacute ischemia in the L PCA. She was found to have worsening carotid artery stenosis from 50% to >80% in one year. Carotid intervention has been recommended with L TCAR by Dr. Gayle Banks in short interval (approxiamately 2-4 weeks) after placement on optimal medical therapy. She had not been taking her aspirin and was intolerant to statins. On discharge she was apparently agreeable to aspirin, ticagrelor and pravastatin. She was also prescribed PCSK9 inhibitor which she received but has not been started. Patient was discharged with plan for TCAR. She had declined recommended rehab. At home she initially seemed to be improving but developed increased confusion for several days, two falls and worsening dysarthria for which son brought her into the hospital. She is moving all extremities and speaking but difficulty finding or forming words. She is scheduled for outpatient cardiac testing tomorrow.      A1c pending (9.1 on 7/28/23)  LDL 79 (260 on 7/28/23)  Tg 378 H45 L 79         Review of Systems:  Functionally limited at baseline with severe PAD    General: negative  Cardiovascular: No chest pain or SOB  Respiratory: no cough, shortness of breath, or wheezing  Gastrointestinal: no abdominal pain, change in bowel habits, or black or bloody stools  Genitourinary ROS: no dysuria, trouble voiding, or hematuria  Musculoskeletal ROS: negative  Neurological ROS: as above under HPI  Hematological and Lymphatic ROS: negative  Dermatological ROS: negative  Psychological ROS: negative  Ophthalmic ROS: negative  ENT ROS: negative    Past Medical History:  Past Medical History:   Diagnosis Date   • Arthritis    • Chronic kidney disease    • Endometriosis    • High cholesterol    • Hypertension    • Kidney disease, chronic, stage III (moderate, EGFR 30-59 ml/min) ([facility])    • Lumbar disc herniation    • Neuropathy    • Peripheral vascular disease ([facility])    • Pneumonia    • Shoulder injury     left   • Spinal stenosis    • Stroke ([address] ) 2015    Memory loss       Past Surgical History:  Past Surgical History:   Procedure Laterality Date   • CARDIAC CATHETERIZATION     • COLONOSCOPY     • FL RETROGRADE PYELOGRAM  4/6/2020   • FRACTURE SURGERY Right     ankle   • OVARIAN CYST SURGERY     • MI CYSTO BLADDER W/URETERAL CATHETERIZATION Bilateral 4/6/2020    Procedure: CYSTOSCOPY WITH RETROGRADE PYELOGRAM;  Surgeon: Chandrakant Alvarado MD;  [facility];  Service: Urology   • MI CYSTO W/INSERT URETERAL STENT Right 4/6/2020    Procedure: INSERTION STENT URETERAL;  Surgeon: Chandrakant Alvarado MD;  [facility];  Service: Urology   • MI CYSTO W/REMOVAL OF TUMORS SMALL N/A 4/6/2020    Procedure: TRANSURETHRAL RESECTION OF BLADDER TUMOR (TURBT); Surgeon: Chandrakant Alvarado MD;  [facility];  Service: Urology   • ROTATOR CUFF REPAIR Left    • TONSILLECTOMY         Social History:  Social History     Substance and Sexual Activity   Alcohol Use Never     Social History     Substance and Sexual Activity   Drug Use Never     Social History     Tobacco Use   Smoking Status Former   • Packs/day: 2.00   • Years: 40.00   • Total pack years: 80.00   • Types: Cigarettes   • Start date: 1966   • Quit date: 7/27/2020   • Years since quitting: 3.0   Smokeless Tobacco Never       Family History:  Family History   Problem Relation Age of Onset   • Hypertension Mother    • Heart disease Mother         Valvular   • Hyperlipidemia Mother    • Hypertension Father    • Heart defect Father         Cardiomegaly   • Stroke Sister         Cerebrovascular Accident   • Arthritis Brother    • Other Brother         Back Disorder       Allergies:   Allergies   Allergen Reactions   • Fenofibrate Other (See Comments)      blood in urine  hx  Kidney Failure   • Colesevelam Other (See Comments)      leg pains   • Colestipol Itching and Other (See Comments)      Swelling lower legs   • Ezetimibe GI Intolerance   • Statins Myalgia       Medications:  Current Facility-Administered Medications   Medication Dose Route Frequency   • allopurinol (ZYLOPRIM) tablet 100 mg  100 mg Oral Daily   • amLODIPine (NORVASC) tablet 10 mg  10 mg Oral Daily   • aspirin chewable tablet 81 mg  81 mg Oral Daily   • cinacalcet (SENSIPAR) tablet 30 mg  30 mg Oral Every Other Day   • cloNIDine (CATAPRES-TTS-3) 0.3 mg/24 hr TD weekly patch  1 patch Transdermal Weekly   • fish oil capsule 1,000 mg  1,000 mg Oral Daily   • heparin (porcine) subcutaneous injection 5,000 Units  5,000 Units Subcutaneous Q8H [address]   • hydrALAZINE (APRESOLINE) injection 5 mg  5 mg Intravenous Q6H PRN   • insulin lispro (HumaLOG) 100 units/mL subcutaneous injection 1-5 Units  1-5 Units Subcutaneous TID AC   • insulin lispro (HumaLOG) 100 units/mL subcutaneous injection 1-5 Units  1-5 Units Subcutaneous HS   • labetalol (NORMODYNE) tablet 300 mg  300 mg Oral BID   • pravastatin (PRAVACHOL) tablet 80 mg  80 mg Oral Daily With Dinner   • ticagrelor (BRILINTA) tablet 90 mg  90 mg Oral Q12H [address]       Vitals:  /70 (BP Location: Right arm)   Pulse 85   Temp 98 °F (36.7 °C) (Oral)   Resp 18   Ht 4' 10" (1.473 m)   Wt 72.6 kg (160 lb)   LMP  (LMP Unknown)   SpO2 96%   BMI 33.44 kg/m²     I/Os:  No intake/output data recorded.     Lab Results and Cultures:   Lab Results   Component Value Date    WBC 8.98 08/15/2023    HGB 14.4 08/15/2023    HCT 40.8 08/15/2023    MCV 88 08/15/2023     08/15/2023     Lab Results   Component Value Date    CALCIUM 9.8 08/15/2023    K 3.6 08/15/2023    CO2 21 08/15/2023     08/15/2023    BUN 26 (H) 08/15/2023    CREATININE 1.89 (H) 08/15/2023     Lab Results   Component Value Date    INR 1.07 01/22/2022    INR 0.96 03/24/2020    INR 1.0 02/10/2020    PROTIME 13.5 01/22/2022    PROTIME 12.7 03/24/2020    PROTIME 10.4 02/10/2020       Lipid Panel: No results found for: "CHOL",     Blood Culture:   Lab Results   Component Value Date    BLOODCX No Growth After 5 Days. 01/22/2022   ,   Urinalysis:   Lab Results   Component Value Date    COLORU Yellow 08/05/2023    CLARITYU Turbid 08/05/2023    SPECGRAV 1.018 08/05/2023    PHUR 5.5 08/05/2023    LEUKOCYTESUR Large (A) 08/05/2023    NITRITE Negative 08/05/2023    GLUCOSEU Negative 08/05/2023    KETONESU Negative 08/05/2023    BILIRUBINUR Negative 08/05/2023    BLOODU Negative 08/05/2023   ,   Urine Culture:   Lab Results   Component Value Date    URINECX >100,000 cfu/ml - Presumptive Escherichia coli (A) 04/06/2022   ,   Wound Culure: No results found for: "WOUNDCULT"    Imaging:      CT BRAIN - WITHOUT CONTRAST 8/15/23     INDICATION:   Neuro deficit, acute, stroke suspected  Recent stroke, new aphasia beginning 3 days ago.     COMPARISON: Multiple prior examinations including most recent brain MRI performed July 27, 2023     TECHNIQUE:  CT examination of the brain was performed. Multiplanar 2D reformatted images were created from the source data.     Radiation dose length product (DLP) for this visit:  676 mGy-cm . This examination, like all CT scans performed in the [facility], was performed utilizing techniques to minimize radiation dose exposure, including the use of iterative   reconstruction and automated exposure control.     IMAGE QUALITY:  Diagnostic.     FINDINGS:     PARENCHYMA: Decreasing density with loss of gray-white junction and sulcal effacement in the medial left parieto-occipital lobe consistent with evolving infarction. No acute parenchymal hemorrhage.     Advanced microangiopathic changes throughout supratentorial white matter. No other segments of acute large vessel infarction. No intracranial mass.  No midline shift.     VENTRICLES AND EXTRA-AXIAL SPACES: Minimal effacement of occipital horn of left lateral ventricle related to cytotoxic edema. No hydrocephalus.     VISUALIZED ORBITS: Normal visualized orbits.     PARANASAL SINUSES: Mild mucosal thickening of the visualized paranasal sinuses.     CALVARIUM AND EXTRACRANIAL SOFT TISSUES:  Normal.     IMPRESSION:     Evolving subacute left nonhemorrhagic posterior cerebral artery distribution infarction. Carotid duplex 7/28/23  FINDINGS:     Right        Impression  PSV  EDV (cm/s)  Direction of Flow  Ratio    Dist. ICA                 61          13                      1.00    Mid. ICA                 106          18                      1.73    Prox. ICA    70%+        301          88                      4.90    Dist CCA                  68          12                              Mid CCA                   61          11                      1.48    Prox CCA                  42           7                      0.54    Ext Carotid               79           5                      1.29    Prox Vert                 27           8  Antegrade                   Subclavian               166           0                              Innominate                77           7                                 Left         Impression  PSV  EDV (cm/s)  Direction of Flow  Ratio    Dist. ICA                 15           4                      0.23    Mid. ICA                  22           7                      0.33    Prox.  ICA    70%+        571         152                      8.68    Dist CCA                  36           6                              Mid CCA                   66           0                      1.02    Prox CCA                  65           0                              Ext Carotid               45           0                      0.69    Prox Vert                 43           5  Antegrade                   Subclavian               106           0 CONCLUSION:  Impression  RIGHT:  There is 70+% stenosis noted in the internal carotid artery. Plaque is  heterogenous/homogenous and irregular/smooth. Vertebral artery flow is antegrade. There is no significant subclavian artery  disease. LEFT:  There is 70+% stenosis noted in the internal carotid artery. Plaque is  heterogenous/homogenous and irregular/smooth. Vertebral artery flow is antegrade. There is no significant subclavian artery  disease. Compared to previous study on 2/17/22 , there is an increase in disease in the  bilateral ICAs  Recommend repeat testing in 6 months as per protocol unless otherwise  Indicated. MRI BRAIN WITHOUT CONTRAST 7/27/23     INDICATION: stroke.     COMPARISON:   Same date CTA     TECHNIQUE:  Multiplanar, multisequence imaging of the brain was performed.        IMAGE QUALITY:  Diagnostic.     FINDINGS:     BRAIN PARENCHYMA:  There is no discrete mass, mass effect or midline shift. There is no intracranial hemorrhage.     Large area of diffusion signal abnormality in the left occipital lobe PCA distribution with long TR signal abnormality smaller foci of cortical diffusion signal abnormality in the left frontal and left parietal and posterior left temporal consistent with   acute to subacute ischemia. Small scattered hyperintensities on T2/FLAIR imaging are noted in the periventricular and subcortical white matter demonstrating an appearance that is statistically most likely to represent advanced microangiopathic change.     VENTRICLES:  Normal for the patient's age.     SELLA AND PITUITARY GLAND:  Normal.     ORBITS:  Normal.     PARANASAL SINUSES:  Normal.     VASCULATURE:  Evaluation of the major intracranial vasculature demonstrates appropriate flow voids.     CALVARIUM AND SKULL BASE:  Normal.     EXTRACRANIAL SOFT TISSUES:  Normal.     IMPRESSION:        1.  Large area of acute to subacute ischemia left PCA distribution occipital lobe.  2. Small foci of acute to subacute ischemia in the left frontal and left parietal/temporal lobes MCA distribution. Physical Exam:    Dysarthria with expressive aphasia. Good B  strength. General appearance: alert and oriented, in no acute distress  Skin: Skin color, texture, turgor normal. No rashes or lesions  Neurologic: dysarthria and expressive difficulties  Head: Normocephalic, without obvious abnormality, atraumatic  Eyes: conjunctivae/corneas clear. PERRL, EOM's intact. Fundi benign. Neck: + carotid artery bruits. No JVD  Back: symmetric, no curvature. ROM normal. No CVA tenderness.   Lungs: clear to auscultation bilaterally  Chest wall: no tenderness  Heart: RRR S1S2 2/6 sm  Abdomen: soft, non-tender; bowel sounds normal; no masses,  no organomegaly  Extremities: extremities normal, warm and well-perfused; no cyanosis, clubbing, or edema    Pulse exam:  Radial: Right: 1+ Left[de-identified] 1+  DP: Right: non-palpable Left: non-palpable  PT: Right: non-palpable Left: non-palpable      Joselo Landrum PA-C  8/16/2023  [facility]

## 2023-08-16 NOTE — PLAN OF CARE
Problem: MOBILITY - ADULT  Goal: Maintain or return to baseline ADL function  Description: INTERVENTIONS:  -  Assess patient's ability to carry out ADLs; assess patient's baseline for ADL function and identify physical deficits which impact ability to perform ADLs (bathing, care of mouth/teeth, toileting, grooming, dressing, etc.)  - Assess/evaluate cause of self-care deficits   - Assess range of motion  - Assess patient's mobility; develop plan if impaired  - Assess patient's need for assistive devices and provide as appropriate  - Encourage maximum independence but intervene and supervise when necessary  - Involve family in performance of ADLs  - Assess for home care needs following discharge   - Consider OT consult to assist with ADL evaluation and planning for discharge  - Provide patient education as appropriate  Outcome: Progressing  Goal: Maintains/Returns to pre admission functional level  Description: INTERVENTIONS:  - Perform BMAT or MOVE assessment daily.   - Set and communicate daily mobility goal to care team and patient/family/caregiver. - Collaborate with rehabilitation services on mobility goals if consulted  - Perform Range of Motion  times a day. - Reposition patient every  hours.   - Dangle patient  times a day  - Stand patient  times a day  - Ambulate patient  times a day  - Out of bed to chair  times a day   - Out of bed for meals  times a day  - Out of bed for toileting  - Record patient progress and toleration of activity level   Outcome: Progressing     Problem: PAIN - ADULT  Goal: Verbalizes/displays adequate comfort level or baseline comfort level  Description: Interventions:  - Encourage patient to monitor pain and request assistance  - Assess pain using appropriate pain scale  - Administer analgesics based on type and severity of pain and evaluate response  - Implement non-pharmacological measures as appropriate and evaluate response  - Consider cultural and social influences on pain and pain management  - Notify physician/advanced practitioner if interventions unsuccessful or patient reports new pain  Outcome: Progressing     Problem: INFECTION - ADULT  Goal: Absence or prevention of progression during hospitalization  Description: INTERVENTIONS:  - Assess and monitor for signs and symptoms of infection  - Monitor lab/diagnostic results  - Monitor all insertion sites, i.e. indwelling lines, tubes, and drains  - Monitor endotracheal if appropriate and nasal secretions for changes in amount and color  - [place] appropriate cooling/warming therapies per order  - Administer medications as ordered  - Instruct and encourage patient and family to use good hand hygiene technique  - Identify and instruct in appropriate isolation precautions for identified infection/condition  Outcome: Progressing  Goal: Absence of fever/infection during neutropenic period  Description: INTERVENTIONS:  - Monitor WBC    Outcome: Progressing     Problem: SAFETY ADULT  Goal: Maintain or return to baseline ADL function  Description: INTERVENTIONS:  -  Assess patient's ability to carry out ADLs; assess patient's baseline for ADL function and identify physical deficits which impact ability to perform ADLs (bathing, care of mouth/teeth, toileting, grooming, dressing, etc.)  - Assess/evaluate cause of self-care deficits   - Assess range of motion  - Assess patient's mobility; develop plan if impaired  - Assess patient's need for assistive devices and provide as appropriate  - Encourage maximum independence but intervene and supervise when necessary  - Involve family in performance of ADLs  - Assess for home care needs following discharge   - Consider OT consult to assist with ADL evaluation and planning for discharge  - Provide patient education as appropriate  Outcome: Progressing  Goal: Maintains/Returns to pre admission functional level  Description: INTERVENTIONS:  - Perform BMAT or MOVE assessment daily.   - Set and communicate daily mobility goal to care team and patient/family/caregiver. - Collaborate with rehabilitation services on mobility goals if consulted  - Perform Range of Motion  times a day. - Reposition patient fabiano hours.   - Dangle patient times a day  - Stand patient  times a day  - Ambulate patient  times a day  - Out of bed to chair  times a day   - Out of bed for meals  times a day  - Out of bed for toileting  - Record patient progress and toleration of activity level   Outcome: Progressing  Goal: Patient will remain free of falls  Description: INTERVENTIONS:  - Educate patient/family on patient safety including physical limitations  - Instruct patient to call for assistance with activity   - Consult OT/PT to assist with strengthening/mobility   - Keep Call bell within reach  - Keep bed low and locked with side rails adjusted as appropriate  - Keep care items and personal belongings within reach  - Initiate and maintain comfort rounds  - Make Fall Risk Sign visible to staff  - Offer Toileting every  Hours, in advance of need  - Initiate/Maintain alarm  - Obtain necessary fall risk management equipment:   - Apply yellow socks and bracelet for high fall risk patients  - Consider moving patient to room near nurses station  Outcome: Progressing     Problem: DISCHARGE PLANNING  Goal: Discharge to home or other facility with appropriate resources  Description: INTERVENTIONS:  - Identify barriers to discharge w/patient and caregiver  - Arrange for needed discharge resources and transportation as appropriate  - Identify discharge learning needs (meds, wound care, etc.)  - Arrange for interpretive services to assist at discharge as needed  - Refer to Case Management Department for coordinating discharge planning if the patient needs post-hospital services based on physician/advanced practitioner order or complex needs related to functional status, cognitive ability, or social support system  Outcome: Progressing Problem: Knowledge Deficit  Goal: Patient/family/caregiver demonstrates understanding of disease process, treatment plan, medications, and discharge instructions  Description: Complete learning assessment and assess knowledge base.   Interventions:  - Provide teaching at level of understanding  - Provide teaching via preferred learning methods  Outcome: Progressing

## 2023-08-16 NOTE — ARC ADMISSION
Referral received for patient consideration of ARC placement for rehab. Will review with ARC physician and update CM with determination when known.

## 2023-08-16 NOTE — CASE MANAGEMENT
Case Management Assessment & Discharge Planning Note    Patient name Louise Dorsey  Location /-91 MRN [id number]  : 1945 Date 2023       Current Admission Date: 8/15/2023  Current Admission [address]   Patient Active Problem List    Diagnosis Date Noted   • Aphasia as late effect of cerebrovascular accident (CVA) 2023   • Dysarthria 08/15/2023   • Chronic kidney disease    • Hypertensive urgency 2023   • Aortoiliac occlusive disease ([address]) 10/11/2022   • History of gastrointestinal stromal tumor (GIST) 2022   • Secondary hyperparathyroidism of renal origin ([address]) 2022   • 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   • Diabetes ([address]) 2021   • Type 2 diabetes mellitus with diabetic peripheral angiopathy without gangrene ([address]) 2021   • Embolism and 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 ([address]) 2021   • Depression, recurrent ([address]) 2021   • Obesity, morbid ([address]) 2021   • Stage 4 chronic kidney disease ([address]) 2020   • Hypertensive kidney disease with stage 4 chronic kidney disease ([address]) 2020   • Cervical radiculopathy 2020   • Malignant neoplasm of overlapping sites of bladder ([address]) 2020   • Stenosis of left anterior descending artery Mid LAD 50-60% stenosis 2020   • 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  2020   • Mesenteric artery stenosis ([address]) 2020   • Positive cardiac stress test  small, mildly severe, partially reversible myocardial perfusion defect of anterior and inferior wall  11/12/2019   • Abnormal CT of the chest suspicious for an infectious or inflammatory bronchiolitis. 11/07/2019   • Celiac artery stenosis (HCC) >70% stenosis in the celiac trunk 11/05/2019   • Median arcuate ligament syndrome ([address]) 11/05/2019   • Atherosclerosis of renal artery ([address]) 07/01/2019   • Aortoiliac stenosis, left (HCC) 02/25/2019   • Spondylosis of lumbar region without myelopathy or radiculopathy 01/29/2019   • Lumbosacral spondylosis without myelopathy 01/29/2019   • Statin intolerance 01/22/2019   • Lumbar disc herniation 01/22/2019   • Herniated lumbar intervertebral disc 01/22/2019   • Chronic GERD 12/04/2017   • Claudication in peripheral vascular disease ([address]) 12/04/2017   • Gout 12/04/2017   • Hx-TIA (transient ischemic attack) 12/04/2017   • Hyperlipidemia associated with type 2 diabetes mellitus ([address]) 12/04/2017   • Hypertension associated with diabetes ([address]) 12/04/2017   • Osteoarthritis 12/04/2017   • Obesity (BMI 30-39.9) 12/04/2017   • Right renal artery stenosis ([address]) 12/04/2017   • 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   • Hyperlipidemia, unspecified 12/04/2017   • Vitamin D deficiency 09/08/2016   • Basilar artery stenosis 06/22/2016   • Type 2 diabetes mellitus with diabetic nephropathy ([address]) 12/19/2015   • Hypercholesteremia 12/19/2015   • Hypertension 12/19/2015   • Polyneuropathy 12/19/2015   • CVA (cerebral vascular accident) ([address]) 11/09/2015      LOS (days): 0  Geometric Mean LOS (GMLOS) (days):   Days to GMLOS:     OBJECTIVE:  PATIENT READMITTED TO HOSPITAL            Current admission status: Inpatient       Preferred Pharmacy:   [facility] [address]  [address]  [address] [address]  Phone: [phone] Fax: [phone]    [id number] - [address] [place], [place] [address] [address]  Phone: [phone] Fax: [phone]    Primary Care Provider: Maile Vilchis MD    Primary Insurance: [facility] HOSPITAL REP  Secondary Insurance:     ASSESSMENT:  [address], [address] Floors Representative - Spouse   Primary Phone: [phone] (Mobile)               Advance Directives  Does patient have a [address]?: No  Was patient offered paperwork?: Yes (provided)  Does patient have Advance Directives?: No  Was patient offered paperwork?: Yes (provided)  Primary Contact: inder mcfarland (Son)   [phone] (Mobile)         Readmission Root Cause  30 Day Readmission: Yes  Who directed you to return to the hospital?: Family  During previous admission, was a post-acute recommendation made?: Yes  What post-acute resources were offered?: STR, Offered, but declined  Patient was readmitted due to: dysarthria, evolving infarction on CT  Action Plan: STR    Patient Information  Admitted from[de-identified] Home  Mental Status: Alert  During Assessment patient was accompanied by: Not accompanied during assessment  Assessment information provided by[de-identified] Kris  Primary Caregiver: Child  Caregiver's Name[de-identified] inder mcfarland (Son)   [phone] (Mobile)  Caregiver's Relationship to Patient[de-identified] Family Member  Caregiver's Telephone Number[de-identified] inder mcfarland (Kris)   [phone] (Mobile)  Support Systems: Kris, [address] of Residence: [address] [place] [address] do you live in?: [place], [address] [place] [address] entry access options.  Select all that apply.: Ramp  Type of Current Residence: [place]  Upon entering residence, is there a bedroom on the main floor (no further steps)?: Yes  Upon entering residence, is there a bathroom on the main floor (no further steps)?: Yes  In the last 12 months, was there a time when you were not able to pay the mortgage or rent on time?: No  In the last 12 months, how many places have you lived?: 1  In the last 12 months, was there a time when you did not have a steady place to sleep or slept in a shelter (including now)?: No  Homeless/housing insecurity resource given?: N/A  Living Arrangements: Lives w/ Son  Is patient a ?: No    Activities of Daily Living Prior to Admission  Functional Status: Assistance  Completes ADLs independently?: No (son says pt's capabilities were dependent upon how she feels that particular day;  son says pt does 90% of ADLs independently, 10% w assistance)  Ambulates independently?: Yes (son says pt has been having falls at home)  Does patient use assisted devices?: Yes  Assisted Devices (DME) used: Electric wheelchair, Clevester Ramal  Does patient currently own DME?: Yes  What DME does the patient currently own?: Bedside Commode, Straight Amber Albino, Electric Wheelchair, Shower Chair, Other (Comment) (stool for shower, glucometer, adjustable bed)  Does patient have a history of Outpatient Therapy (PT/OT)?: No  Does the patient have a history of Short-Term Rehab?: No  Does patient have a history of HHC?: Yes  Does patient currently have [facility]?: Yes    Current Home Health Care  Type of Current Home Care Services: Home PT, Home OT, Home ST, Nurse visit  [facility] Provider[de-identified] PCP    Patient Information Continued  Income Source:  Other (Comment) (pension + SSI)  Does patient have prescription coverage?: Yes  Within the past 12 months, you worried that your food would run out before you got the money to buy more.: Never true  Within the past 12 months, the food you bought just didn't last and you didn't have money to get more.: Never true  Food insecurity resource given?: N/A  Does patient receive dialysis treatments?: No  Does patient have a history of substance abuse?: No  Does patient have a history of Mental Health Diagnosis?: No         Means of Transportation  Means of Transport to [facility] [place][de-identified] Family transport  In the past 12 months, has lack of transportation kept you from medical appointments or from getting medications?: No  In the past 12 months, has lack of transportation kept you from meetings, work, or from getting things needed for daily living?: No  Was application for public transport provided?: N/A        DISCHARGE DETAILS:    Discharge planning discussed with[de-identified] areli Murphy Landon of Choice: Yes  Comments - Freedom of Choice: S/W son Enriqueta Arian via T/C. Introduced self and explained role of CM, including arranging DME, STR, home health, out pt tx. Advised family that CM will make appropriate referrals based on treatment team recommendations and MD orders, and he/pt can consider recommended plan of care and choose from available vendors. Son says after speaking w several physicians yesterday and today, he wants pt to go to acute rehab. Explained acute and subacute rehab programs, including admissions processes. Son agreeable to referrals to acute and subacute programs once medically cleared. Pt may have needed CEA done this admission;  vascular following, awaiting MRI. CM contacted family/caregiver?: Yes  Were Treatment Team discharge recommendations reviewed with patient/caregiver?: Yes  Did patient/caregiver verbalize understanding of patient care needs?: Yes  Were patient/caregiver advised of the risks associated with not following Treatment Team discharge recommendations?: Yes    Contacts  Patient Contacts: inder mcfarland (Son)   [phone] (Mobile)  Relationship to Patient[de-identified] Family  Contact Method: Phone  Phone Number: inder mcfarland (Son)   [phone] (Mobile)  Reason/Outcome: Continuity of Care, Discharge [address]         Is the patient interested in [facility] at discharge?: No (pt and son requesting acute rehab placement)    DME Referral Provided  Referral made for DME?: No    Other Referral/Resources/Interventions Provided:  Referral Comments: Son says after speaking w several physicians yesterday and today, he wants pt to go to acute rehab.   Explained acute and subacute rehab programs, including admissions processes. Son agreeable to referrals to acute and subacute programs once medically cleared. Pt may have needed CEA done this admission;  vascular following, awaiting MRI.          Treatment Team Recommendation: Short Term Rehab  Discharge Destination Plan[de-identified] Short Term Rehab  Transport at Discharge : Family

## 2023-08-16 NOTE — UTILIZATION REVIEW
Initial Clinical Review    OBSERVATION WRITTEN 8/15/23 @ 1349 CONVERTED TO INPATIENT ADMISSION 8/16/23 @ 1222 DUE TO FURTHER DIAGNOSTIC WORKUP REQUIRED FOR DYSARTHRIA, REQUIRING AT LEAST A 2 MIDNIGHT STAY. Admission: Date/Time/Statement:   Admission Orders (From admission, onward)     Ordered        08/16/23 1222  Inpatient Admission  Once            08/15/23 1349  Place in Observation  Once                      Orders Placed This Encounter   Procedures   • Inpatient Admission     Standing Status:   Standing     Number of Occurrences:   1     Order Specific Question:   Level of Care     Answer:   Med Surg [16]     Order Specific Question:   Estimated length of stay     Answer:   More than 2 Midnights     Order Specific Question:   Certification     Answer:   I certify that inpatient services are medically necessary for this patient for a duration of greater than two midnights. See H&P and MD Progress Notes for additional information about the patient's course of treatment. ED Arrival Information     Expected   -    Arrival   8/15/2023 10:54    Acuity   Emergent            Means of arrival   Wheelchair    Escorted by   Family Member    Service   Hospitalist    Admission type   Emergency            Arrival complaint   SLURRED SPEECH           Chief Complaint   Patient presents with   • Aphasia     Aphasia x 3 days, recent stroke        Initial Presentation: 70 y.o. female who presented to [address] [facility] ED initially admitted Observation status then converted to Inpatient due to Dysarthia. Presented due to worsening word finding ability, also stating right side is "no good". CVA few weeks ago at which time STR was recommended but pt refused and wanted to go home. PMH: CVA, DKD, HLD, HTN, kidney disease stage III, PVD Plan:  med surg telemetry, Neurology consult, neuro checks, consider vascular sx consult, continue home ASA and Brilinta.      8/16/2023 Inpatient Admission:  Per Neurology:  Aphasia as late effect of CVA: Worsening aphasia; etiology unclear. Possible recrudescence secondary to dehydration/infection/underlying cognitive dysfunction: order MRI brain and echo, fu on A1c, continue home ASA, Brilinta and statin, continue telemetry, PT OT ST eval, stat CTH for any decline. Date:  8/17    Day 2:    Pt continues with expressive aphasia and difficulty forming sentences. Reports continued improvement in right upper extremity weakness. Patient will benefit from L TCAR, CM following for dispo planning. Cardiology to eval pt, continue medical optimization with aspirin, ticagrelor, and pravastatin. RICARDO improving, continue to monitor. Date:  8/17    Day 3: Has surpassed a 2nd midnight with active treatments and services, which include CM working on placement for acute rehab, continue to monitor neuro status and labs.      ED Triage Vitals [08/15/23 1110]   Temperature Pulse Respirations Blood Pressure SpO2   97.7 °F (36.5 °C) 67 16 135/55 98 %      Temp Source Heart Rate Source Patient Position - Orthostatic VS BP Location FiO2 (%)   Oral Monitor Lying Right arm --      Pain Score       No Pain          Wt Readings from Last 1 Encounters:   08/15/23 72.6 kg (160 lb)     Additional Vital Signs:   Date/Time Temp Pulse Resp BP MAP (mmHg) SpO2 O2 Device Patient Position - Orthostatic VS   08/17/23 1100 97.6 °F (36.4 °C) 79 18 154/78 100 94 % None (Room air) Sitting   08/17/23 0700 97.4 °F (36.3 °C) Abnormal  78 18 157/78 104 93 % None (Room air) Sitting   08/17/23 0300 97.6 °F (36.4 °C) 62 18 164/58 93 94 % None (Room air) Lying   08/16/23 2300 97.4 °F (36.3 °C) Abnormal  -- 18 144/53 92 97 % None (Room air) Lying   08/16/23 2025 -- -- -- -- -- -- None (Room air) --   08/16/23 1900 98.1 °F (36.7 °C) 79 19 140/69 93 96 % None (Room air) Sitting   08/16/23 1500 98.1 °F (36.7 °C) 85 18 135/68 92 98 % None (Room air) Sitting   08/16/23 1100 98 °F (36.7 °C) 85 18 134/70 91 96 % None (Room air) Sitting   08/16/23 0700 97.8 °F (36.6 °C) 72 18 147/68 94 98 % None (Room air) Sitting   08/16/23 0430 98.1 °F (36.7 °C) -- 18 152/72 99 -- None (Room air) Sitting   08/16/23 02:31:54 98 °F (36.7 °C) 59 18 162/65 97 96 % None (Room air) Lying   08/16/23 0030 98.1 °F (36.7 °C) 67 18 152/59 90 96 % None (Room air) Lying   08/15/23 2328 -- -- -- -- -- -- None (Room air) --   08/15/23 23:15:28 98.1 °F (36.7 °C) 62 18 146/55 85 96 % None (Room air) Lying   08/15/23 2230 98 °F (36.7 °C) 73 -- 170/65 100 98 %  -- Sitting   SpO2: Simultaneous filing. User may not have seen previous data. at 08/15/23 2230   08/15/23 22:29:27 -- -- 18 -- -- -- -- Sitting   08/15/23 21:28:14 -- 68 -- 150/66 94 97 % -- --   08/15/23 2030 98 °F (36.7 °C) 63 18 189/58 Abnormal  102 98 % -- Sitting   08/15/23 1930 97.6 °F (36.4 °C) 60 18 173/73 Abnormal  106 -- -- Sitting   08/15/23 1830 -- 63 18 173/74 Abnormal  107 98 % None (Room air) Sitting   08/15/23 1730 97.5 °F (36.4 °C) 65 14 180/67 Abnormal  105 98 % None (Room air) Sitting   08/15/23 1430 -- 65 14 164/66 95 99 % None (Room air) Lying   08/15/23 1330 -- 70 14 135/77 97 100 % None (Room air) Lying   08/15/23 1231 -- 70 -- 142/61 -- 99 % None (Room air) Lying   08/15/23 1200 -- 72 -- 137/64 -- 99 % None (Room air) --   08/15/23 1110 97.7 °F (36.5 °C) 67 16 135/55 -- 98 % None (Room air) Lying     Pertinent Labs/Diagnostic Test Results:   8/15 EKG: Sinus rhythm with 1st degree A-V block  Left ventricular hypertrophy with repolarization abnormality  Inferior infarct (cited on or before 04-NOV-2019)  Anteroseptal infarct (cited on or before 04-NOV-2019)  Abnormal ECG    8/17 Echo and stress test pending. MRI brain wo contrast   Final Result by Zohaib Downing MD (08/16 2017)         1. Acute punctate infarcts in the left occipital lobe superimposed on the previously demonstrated subacute to early chronic infarct. 2. Punctate acute infarcts in the left MCA territory.       The study was marked in [facility] for immediate notification. Workstation performed: [id number]         CT head wo contrast   Final Result by Viridiana Munguia MD (08/15 1313)      Evolving subacute left nonhemorrhagic posterior cerebral artery distribution infarction.          Workstation performed: [id number]               Results from last 7 days   Lab Units 08/17/23  0531 08/15/23  1557 08/15/23  1126   WBC Thousand/uL 7.74  --  8.98   HEMOGLOBIN g/dL 13.3  --  14.4   HEMATOCRIT % 37.7  --  40.8   PLATELETS Thousands/uL 243 242 286   NEUTROS ABS Thousands/µL 5.13  --  6.55         Results from last 7 days   Lab Units 08/17/23  0531 08/15/23  1126   SODIUM mmol/L 138 138   POTASSIUM mmol/L 3.4* 3.6   CHLORIDE mmol/L 108 106   CO2 mmol/L 20* 21   ANION GAP mmol/L 10 11   BUN mg/dL 26* 26*   CREATININE mg/dL 1.69* 1.89*   EGFR ml/min/1.73sq m 29 25   CALCIUM mg/dL 9.1 9.8     Results from last 7 days   Lab Units 08/15/23  1126   AST U/L 14   ALT U/L 15   ALK PHOS U/L 87   TOTAL PROTEIN g/dL 7.5   ALBUMIN g/dL 4.3   TOTAL BILIRUBIN mg/dL 0.71     Results from last 7 days   Lab Units 08/17/23  1118 08/17/23  0704 08/16/23  2029 08/16/23  1633 08/16/23  1058 08/16/23  0712 08/15/23  2042 08/15/23  1657 08/15/23  1102   POC GLUCOSE mg/dl 184* 196* 138 258* 205* 184* 138 230* 263*     Results from last 7 days   Lab Units 08/17/23  0531 08/15/23  1126   GLUCOSE RANDOM mg/dL 187* 267*         Results from last 7 days   Lab Units 08/16/23  0544   HEMOGLOBIN A1C % 8.5*   EAG mg/dl 197     Results from last 7 days   Lab Units 08/15/23  1526 08/15/23  1326 08/15/23  1126   HS TNI 0HR ng/L  --   --  5   HS TNI 2HR ng/L  --  5  --    HSTNI D2 ng/L  --  0  --    HS TNI 4HR ng/L 4  --   --    HSTNI D4 ng/L -1  --   --        Past Medical History:   Diagnosis Date   • Arthritis    • Chronic kidney disease    • Endometriosis    • High cholesterol    • Hypertension    • Kidney disease, chronic, stage III (moderate, EGFR 30-59 ml/min) ([facility])    • Lumbar disc herniation    • Neuropathy    • Peripheral vascular disease ([address] [facility])    • Pneumonia    • Shoulder injury     left   • Spinal stenosis    • Stroke [facility]) 2015    Memory loss     Present on Admission:  • Dysarthria  • Basilar artery stenosis  • CVA (cerebral vascular accident) ([address] [facility])  • Chronic GERD  • Claudication in peripheral vascular disease (HCC)  • Hypertension      Admitting Diagnosis: Aphasia [R47.01]  Dysarthria [R47.1]  Stroke-like symptoms [R29.90]  Type 2 diabetes mellitus with diabetic peripheral angiopathy without gangrene, without long-term current use of insulin ([address] [facility]) [E11.51]  Age/Sex: 70 y.o. female  Admission Orders:  Scheduled Medications:  allopurinol, 100 mg, Oral, Daily  ALPRAZolam, 0.25 mg, Oral, Once  amLODIPine, 10 mg, Oral, Daily  aspirin, 81 mg, Oral, Daily  cinacalcet, 30 mg, Oral, Every Other Day  cloNIDine, 1 patch, Transdermal, Weekly  fish oil, 1,000 mg, Oral, TID  heparin (porcine), 5,000 Units, Subcutaneous, Q8H [facility]  insulin lispro, 1-5 Units, Subcutaneous, TID AC  insulin lispro, 1-5 Units, Subcutaneous, HS  labetalol, 300 mg, Oral, BID  pravastatin, 80 mg, Oral, Daily With Dinner  ticagrelor, 90 mg, Oral, Q12H [facility]      Continuous IV Infusions: none     PRN Meds:  hydrALAZINE, 5 mg, Intravenous, Q6H PRN      scd  Inc spirometry    IP CONSULT TO PHYSICAL MEDICINE REHAB  IP CONSULT TO NEUROLOGY  IP CONSULT TO CASE MANAGEMENT  IP CONSULT TO NUTRITION SERVICES  IP CONSULT TO VASCULAR SURGERY  IP CONSULT TO CARDIOLOGY    Network Utilization Review Department  ATTENTION: Please call with any questions or concerns to [phone] and carefully listen to the prompts so that you are directed to the right person. All voicemails are confidential.  Millicent Castaneda all requests for admission clinical reviews, approved or denied determinations and any other requests to dedicated fax number below belonging to the campus where the patient is receiving treatment.  List of dedicated fax numbers for the Facilities:  FACILITY NAME UR FAX NUMBER   ADMISSION DENIALS (Administrative/Medical Necessity) [phone] [address] (Maternity/NICU/Pediatrics) [phone]   [address] 1000 [facility] [phone] [address] 5220 [facility] [address] [address] [facility] [address] [phone]

## 2023-08-16 NOTE — CONSULTS
PHYSICAL MEDICINE AND REHABILITATION CONSULT NOTE  Emigdio Cantor 70 y.o. female MRN: [id number]  Unit/Bed#: -01 Encounter: [id number]    Requested by (Physician/Service): Ricardo Richmond,*  Reason for Consultation:  Assessment of rehabilitation needs  Reason for Hospitalization:  Aphasia [R47.01]  Dysarthria [R47.1]  Stroke-like symptoms [R29.90]  Type 2 diabetes mellitus with diabetic peripheral angiopathy without gangrene, without long-term current use of insulin ([address]) [E11.51]      History of Present Illness:  Emigdio Cantor is a 70 y.o. female who  has a past medical history of Arthritis, Chronic kidney disease, Endometriosis, High cholesterol, Hypertension, Kidney disease, chronic, stage III (moderate, EGFR 30-59 ml/min) (HCC), Lumbar disc herniation, Neuropathy, Peripheral vascular disease ([address]), Pneumonia, Shoulder injury, Spinal stenosis, and Stroke ([address]) (2015). who presented to the [address] [place] [address] with right side weakness and speech difficulties. Vascular consulted secondary to bilateral carotid artery stenosis with plan for L TCAR. PM&R consulted for rehabilitation recommendations. Restrictions include:  none    Hospital Complications/Comorbidities:   Complications: As above  Comorbidities: As above    Morbid Obesity (BMI > 40) no     Last Weight Last BMI   Wt Readings from Last 1 Encounters:   08/15/23 72.6 kg (160 lb)    Body mass index is 33.44 kg/m². Functional History:     Prior to Admission: Independent with functional mobility,needs assist with ADLS and IADLS. Present:  Physical Therapy Occupational Therapy Speech Therapy   Minimal assist with bed mobility. Supervision with eating,grooming,minimal assist with UB and LB bathing. Minial assist with toileting. Expressive aphasia.      Past Medical History:   Past Surgical History:   Family History:     Past Medical History:   Diagnosis Date   • Arthritis    • Chronic kidney disease    • Endometriosis • High cholesterol    • Hypertension    • Kidney disease, chronic, stage III (moderate, EGFR 30-59 ml/min) ([facility])    • Lumbar disc herniation    • Neuropathy    • Peripheral vascular disease ([facility])    • Pneumonia    • Shoulder injury     left   • Spinal stenosis    • Stroke ([address]) 2015    Memory loss    Past Surgical History:   Procedure Laterality Date   • CARDIAC CATHETERIZATION     • COLONOSCOPY     • FL RETROGRADE PYELOGRAM  4/6/2020   • FRACTURE SURGERY Right     ankle   • OVARIAN CYST SURGERY     • WA CYSTO BLADDER W/URETERAL CATHETERIZATION Bilateral 4/6/2020    Procedure: CYSTOSCOPY WITH RETROGRADE PYELOGRAM;  Surgeon: Ute Whipple MD;  [facility];  Service: Urology   • WA CYSTO W/INSERT URETERAL STENT Right 4/6/2020    Procedure: INSERTION STENT URETERAL;  Surgeon: Ute Whipple MD;  [facility];  Service: Urology   • WA CYSTO W/REMOVAL OF TUMORS SMALL N/A 4/6/2020    Procedure: TRANSURETHRAL RESECTION OF BLADDER TUMOR (TURBT); Surgeon: Ute Whipple MD;  [facility];  Service: Urology   • ROTATOR CUFF REPAIR Left    • TONSILLECTOMY       Family History   Problem Relation Age of Onset   • Hypertension Mother    • Heart disease Mother         Valvular   • Hyperlipidemia Mother    • Hypertension Father    • Heart defect Father         Cardiomegaly   • Stroke Sister         Cerebrovascular Accident   • Arthritis Brother    • Other Brother         Back Disorder     - No family history of neurologic diseases.       Medications:    Current Facility-Administered Medications:   •  allopurinol (ZYLOPRIM) tablet 100 mg, 100 mg, Oral, Daily, Des Ortiz MD, 100 mg at 08/16/23 5584  •  amLODIPine (NORVASC) tablet 10 mg, 10 mg, Oral, Daily, Des Ortiz MD, 10 mg at 08/16/23 0726  •  aspirin chewable tablet 81 mg, 81 mg, Oral, Daily, Des Ortiz MD, 81 mg at 08/16/23 0351  •  cinacalcet (SENSIPAR) tablet 30 mg, 30 mg, Oral, Every Other Day, Des Ortiz MD, 30 mg at 08/15/23 1736  •  cloNIDine (CATAPRES-TTS-3) 0.3 mg/24 hr TD weekly patch, 1 patch, Transdermal, Weekly, Des Barksdale MD, 0.3 mg at 08/15/23 1808  •  fish oil capsule 1,000 mg, 1,000 mg, Oral, Daily, Des Barksdale MD, 1,000 mg at 08/16/23 5303  •  heparin (porcine) subcutaneous injection 5,000 Units, 5,000 Units, Subcutaneous, Q8H [facility], 5,000 Units at 08/16/23 1500 **AND** [COMPLETED] Platelet count, , , Once, Des Barksdale MD  •  hydrALAZINE (APRESOLINE) injection 5 mg, 5 mg, Intravenous, Q6H PRN, Des Barksdale MD, 5 mg at 08/15/23 1859  •  insulin lispro (HumaLOG) 100 units/mL subcutaneous injection 1-5 Units, 1-5 Units, Subcutaneous, TID AC, 1 Units at 08/16/23 1200 **AND** Fingerstick Glucose (POCT), , , TID AC, Des Barksdale MD  •  insulin lispro (HumaLOG) 100 units/mL subcutaneous injection 1-5 Units, 1-5 Units, Subcutaneous, HS, Des Barksdale MD  •  labetalol (NORMODYNE) tablet 300 mg, 300 mg, Oral, BID, Des Barksdale MD, 300 mg at 08/16/23 1059  •  pravastatin (PRAVACHOL) tablet 80 mg, 80 mg, Oral, Daily With Ino Frazier MD, 80 mg at 08/15/23 1735  •  ticagrelor (BRILINTA) tablet 90 mg, 90 mg, Oral, Q12H [facility], Des Barksdale MD, 90 mg at 08/16/23 2292    Allergies:    Allergies   Allergen Reactions   • Fenofibrate Other (See Comments)      blood in urine  hx  Kidney Failure   • Colesevelam Other (See Comments)      leg pains   • Colestipol Itching and Other (See Comments)      Swelling lower legs   • Ezetimibe GI Intolerance   • Statins Myalgia        Social History:    Social History     Socioeconomic History   • Marital status: /Civil Union     Spouse name: Not on file   • Number of children: Not on file   • Years of education: Not on file   • Highest education level: Not on file   Occupational History   • Occupation: retired   Tobacco Use   • Smoking status: Former     Packs/day: 2.00     Years: 40.00     Total pack years: 80.00 Types: Cigarettes     Start date: 1     Quit date: 7/27/2020     Years since quitting: 3.0   • Smokeless tobacco: Never   Vaping Use   • Vaping Use: Never used   Substance and Sexual Activity   • Alcohol use: Never   • Drug use: Never   • Sexual activity: Not Currently     Partners: Male   Other Topics Concern   • Not on file   Social History Narrative    Occasional Caffeine Consumption     Social Determinants of Health     Financial Resource Strain: Not on file   Food Insecurity: No Food Insecurity (8/16/2023)    Hunger Vital Sign    • Worried About Running Out of Food in the Last Year: Never true    • Ran Out of Food in the Last Year: Never true   Transportation Needs: No Transportation Needs (8/16/2023)    PRAPARE - Transportation    • Lack of Transportation (Medical): No    • Lack of Transportation (Non-Medical): No   Physical Activity: Not on file   Stress: Not on file   Social Connections: Not on file   Intimate Partner Violence: Not on file   Housing Stability: Low Risk  (8/16/2023)    Housing Stability Vital Sign    • Unable to Pay for Housing in the Last Year: No    • Number of Places Lived in the Last Year: 1    • Unstable Housing in the Last Year: No        Luis Waggoner lives in a one level house with a ramp. Review of Systems: A 10-point review of systems was performed. Negative except as listed above.      Physical Exam:  Vital Signs:      Temp:  [97.5 °F (36.4 °C)-98.1 °F (36.7 °C)] 98.1 °F (36.7 °C)  HR:  [59-85] 85  Resp:  [14-18] 18  BP: (134-189)/(55-74) 135/68 No intake or output data in the 24 hours ending 08/16/23 1521     Laboratory:      Lab Results   Component Value Date    HGB 14.4 08/15/2023    HCT 40.8 08/15/2023    WBC 8.98 08/15/2023     Lab Results   Component Value Date    BUN 26 (H) 08/15/2023    BUN 45 (H) 10/20/2020    K 3.6 08/15/2023    K 3.8 10/20/2020     08/15/2023    CL 98 10/20/2020    CREATININE 1.89 (H) 08/15/2023     Lab Results   Component Value Date PROTIME 13.5 01/22/2022    INR 1.07 01/22/2022        General:NAD  Head: Normal, normocephalic, atraumatic. Eye: Normal external eye, conjunctiva, lids   Ears: Normal external ears  Nose: Normal external nose, mucus membranes. Pharynx: Dental Hygiene adequate. Normal buccal mucosa. Normal pharynx. Neck / Thyroid: Supple, no masses, nodes, nodules or enlargement. Abdomen: soft. Skin/Extremity:warm and dry. No edema. Neurologic: follows commands. Speech is normal.F_T_N is intact . DTRs2+ and symmetric in bilateral biceps,1+ at the knees. N II-XII are intact except right homonymous hemianopsia. Psych: Appropriate affect, alert and disoriented to  date and time . Musculoskeletal - Strength:   Right  Left  Site  Right  Left  Site    5 5  S Ab: Shoulder Abductors  5  5  HF: Hip Flexors    5 5  EF: Elbow Flexors  5 5 KF: Knee Flexors    5  5  EE: Elbow Extensors  5  5  KE: Knee Extensors    5  5  WE: Wrist Extensors  5  5  DR: Dorsi Flexors    5  5  FF: Finger Flexors  5  5  PF: Plantar Flexors    5  5  HI: Hand Intrinsics  5  5  EHL: Extensor Hallucis Longus         Imaging: Reviewed  CT head wo contrast    Result Date: 8/15/2023  Impression: Evolving subacute left nonhemorrhagic posterior cerebral artery distribution infarction. Workstation performed: [id number]       Assessment and Recommendations: . 74 y old female with worsening speech difficulties secondary to acute L PCA CVA. Impairments:  Impaired functional mobility and ability to perform ADL's  Impaired  speech    Recommendations:  - Chart reviewed  - Imaging reviewed   - Continue PT/OT/SLP while inpatient.       - Pt is unable to safely return home until she is at the supervision/contact-guard functional level (25% assistance level with or without a device)  modified-independent functional level (0% assistance with a device) independent functional level (0% assistance without a device)      -Based on the patient's prior and current functional status, and ability to tolerate 3 hrs of therapy per day, we believe she is a good acute inpatient rehab candidate (3 hours of therapy a day, medical and nursing care, highest level of rehab). - In addition to therapies, she will require medical management of HTN,CKD,GERD and basilar artery stenosis.    - Disposition unclear at this point in time but inpatient rehab a possibility in the near future pending achievement of clinically stability, potential for functional progress. Thank you for allowing the PM&R service to participate in the care of this patient. We will continue to follow Neo Parnell's progress with you. Please do not hesitate to call with questions or concerns    ** Please Note: Fluency Direct voice to text software may have been used in the creation of this document.  **

## 2023-08-16 NOTE — SPEECH THERAPY NOTE
Speech Language/Pathology      SLP orders placed as per stroke pathway. At present pt denies difficulties or changes in swallow function. Dysphagia screening completed and patient w/ no signs of difficulty following intake of thin liquids, regular solids . Need for formal evaluation is not indicated at this time. Will follow up for aphasia intervention.       Sapphire Herrera, [address] [facility], [facility] [address] [facility]  Speech-Language Pathologist  [place] #[id number]  [place] #[id number]

## 2023-08-16 NOTE — ASSESSMENT & PLAN NOTE
Recent L stroke with speech difficulties and right-sided weakness   Bilateral carotid artery stenosis with plan for L TCAR    -Patient returns with increased weakness and expressive difficulties    -CT brain w/o contrast 8/15/23:  Evolving subacute left nonhemorrhagic posterior cerebral artery distribution infarction. -MRI brain w/o contrast 7/27/23: Large acute to subacute ischemia in the L PCA distribution occipital lobe.  Small acute to sub acute L frontal and L parietal/temporal lobes of MCA distribution.     -Carotid duplex 7/28/23: B DAVID > 70% (R 301/88, ratio 4.9; L 571/152, ratio 8.68)    -BUN/creat 26/1.89 which is a bit higher than baseline    Plan:  -She may benefit from L TCAR when medically stable; will await neurology work up and evaluation and recommendations on timing for TCAR  -Reviewed recent imaging  -Continue with clinical monitoring  -On examination, she has moderate expressive difficulties  -Waiting for neurology consult   -PT/OT/speech; may benefit from rehab of which she is now agreeable  -Continue with DAPT (aspirn/ticagrelo), fish oil and statin therapy  -Continue to optimize medical therapy per SLIM  -Follow up A1c  -Consider cardiology consult for cardiac risk assessment for vascular surgery  -Will review with Dr. Lo Beltran and further rec's forthcoming

## 2023-08-16 NOTE — TELEPHONE ENCOUNTER
Gerri (OT) with [facility] called - She went to patient's home yesterday for initial evaluation but patient was taken to the hospital. Patient will be going to short term rehab upon discharge from hospital.  If questions, she can be reached at [phone].

## 2023-08-16 NOTE — CASE MANAGEMENT
Case Management Discharge Planning Note    Patient name Hipolito Farley  Location [address] [facility] [address] 234/-58 MRN [id number]  : 1945 Date 2023       Current Admission Date: 8/15/2023  Current Admission [address]   Patient Active Problem List    Diagnosis Date Noted   • Aphasia as late effect of cerebrovascular accident (CVA) 2023   • Dysarthria 08/15/2023   • Chronic kidney disease    • Hypertensive urgency 2023   • Aortoiliac occlusive disease ([address]) 10/11/2022   • History of gastrointestinal stromal tumor (GIST) 2022   • Secondary hyperparathyroidism of renal origin ([address]) 2022   • 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   • Diabetes ([address]) 2021   • Type 2 diabetes mellitus with diabetic peripheral angiopathy without gangrene ([address]) 2021   • Embolism and 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 ([address]) 2021   • Depression, recurrent ([address]) 2021   • Obesity, morbid ([address]) 2021   • Stage 4 chronic kidney disease ([address]) 2020   • Hypertensive kidney disease with stage 4 chronic kidney disease ([address]) 2020   • Cervical radiculopathy 2020   • Malignant neoplasm of overlapping sites of bladder ([address]) 2020   • Stenosis of left anterior descending artery Mid LAD 50-60% stenosis 2020   • 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  2020   • Mesenteric artery stenosis (HCC) 2020   • Positive cardiac stress test  small, mildly severe, partially reversible myocardial perfusion defect of anterior and inferior wall  11/12/2019   • Abnormal CT of the chest suspicious for an infectious or inflammatory bronchiolitis. 11/07/2019   • Celiac artery stenosis (HCC) >70% stenosis in the celiac trunk 11/05/2019   • Median arcuate ligament syndrome ([address]) 11/05/2019   • Atherosclerosis of renal artery ([address]) 07/01/2019   • Aortoiliac stenosis, left (HCC) 02/25/2019   • Spondylosis of lumbar region without myelopathy or radiculopathy 01/29/2019   • Lumbosacral spondylosis without myelopathy 01/29/2019   • Statin intolerance 01/22/2019   • Lumbar disc herniation 01/22/2019   • Herniated lumbar intervertebral disc 01/22/2019   • Chronic GERD 12/04/2017   • Claudication in peripheral vascular disease ([address]) 12/04/2017   • Gout 12/04/2017   • Hx-TIA (transient ischemic attack) 12/04/2017   • Hyperlipidemia associated with type 2 diabetes mellitus ([address]) 12/04/2017   • Hypertension associated with diabetes ([address]) 12/04/2017   • Osteoarthritis 12/04/2017   • Obesity (BMI 30-39.9) 12/04/2017   • Right renal artery stenosis ([address]) 12/04/2017   • 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   • Hyperlipidemia, unspecified 12/04/2017   • Vitamin D deficiency 09/08/2016   • Basilar artery stenosis 06/22/2016   • Type 2 diabetes mellitus with diabetic nephropathy ([address]) 12/19/2015   • Hypercholesteremia 12/19/2015   • Hypertension 12/19/2015   • Polyneuropathy 12/19/2015   • CVA (cerebral vascular accident) ([address]) 11/09/2015      LOS (days): 0  Geometric Mean LOS (GMLOS) (days):   Days to GMLOS:     OBJECTIVE:            Current admission status: Inpatient   Preferred Pharmacy:   [address] [place] [address]  [address] [place] [address]  Phone: [phone] Fax: [phone]    [id number] [place] - [address]   [place] [address]  Phone: [phone] Fax: [phone]    Primary Care Provider: Rodolfo Shell MD    Primary Insurance: Ashlyn Ortiz [facility] REP  Secondary Insurance:     DISCHARGE DETAILS:    Discharge planning discussed with[de-identified] son Chemo Brooks of Choice: Yes  Comments - Freedom of Choice: S/W son Vivi Field via T/C. Introduced self and explained role of CM, including arranging DME, STR, home health, out pt tx. Advised family that CM will make appropriate referrals based on treatment team recommendations and MD orders, and he/pt can consider recommended plan of care and choose from available vendors. Son says after speaking w several physicians yesterday and today, he wants pt to go to acute rehab. Explained acute and subacute rehab programs, including admissions processes. Son agreeable to referrals to acute and subacute programs once medically cleared. Pt may have needed CEA done this admission;  vascular following, awaiting MRI. CM contacted family/caregiver?: Yes  Were Treatment Team discharge recommendations reviewed with patient/caregiver?: Yes  Did patient/caregiver verbalize understanding of patient care needs?: Yes  Were patient/caregiver advised of the risks associated with not following Treatment Team discharge recommendations?: Yes    Contacts  Patient Contacts: inder mcfarland (Son)   [phone] (Mobile)  Relationship to Patient[de-identified] Family  Contact Method: Phone  Phone Number: inder mcfarland (Son)   [phone] (Mobile)  Reason/Outcome: Continuity of Care, Discharge [address]         Is the patient interested in [facility] at discharge?: No (pt and son requesting acute rehab placement)    DME Referral Provided  Referral made for DME?: No    Other Referral/Resources/Interventions Provided:  Referral Comments: [place] referrals sent to acute and subacute rehab;  awaiting responses.          Treatment Team Recommendation: Short Term Rehab  Discharge Destination Plan[de-identified] Short Term Rehab  Transport at Discharge : Family

## 2023-08-16 NOTE — ASSESSMENT & PLAN NOTE
· Patient with worsening dysarthria with recent large stroke. · Potentially just evolution of her previous stroke  · Recent CTA done shows, among other lesions, Advanced atherosclerotic change of bilateral cervical carotid arteries. Estimated 85-90% left and 80% right proximal ICA stenosis.    · Continue aspirin and Brilinta for now  · PT/OT/SLP  · Vascular surgery consult - may need TCAR, timing to be determined, will need cardiac clearance as per vascular so will consult cardiology  · Monitor neurological exam

## 2023-08-16 NOTE — OCCUPATIONAL THERAPY NOTE
Occupational Therapy Evaluation      Uziel Crowder    8/16/2023    Patient Active Problem List   Diagnosis    Basilar artery stenosis    CVA (cerebral vascular accident) ([address])    Chronic GERD    Claudication in peripheral vascular disease ([address])    Type 2 diabetes mellitus with diabetic nephropathy ([address])    Gout    Hx-TIA (transient ischemic attack)    Hypercholesteremia    Hyperlipidemia associated with type 2 diabetes mellitus ([address])    Hypertension    Hypertension associated with diabetes ([address])    Osteoarthritis    Obesity (BMI 30-39. 9)    Polyneuropathy    Right renal artery stenosis (HCC)    Vertebrobasilar artery syndrome    Vitamin D deficiency    Statin intolerance    Lumbar disc herniation    Spondylosis of lumbar region without myelopathy or radiculopathy    Aortoiliac stenosis, left (HCC)    Lumbosacral spondylosis without myelopathy    Hyperlipidemia, unspecified    Herniated lumbar intervertebral disc    Atherosclerosis of renal artery (HCC)    Celiac artery stenosis (HCC) >70% stenosis in the celiac trunk    Abnormal CT of the chest suspicious for an infectious or inflammatory bronchiolitis. Positive cardiac stress test  small, mildly severe, partially reversible myocardial perfusion defect of anterior and inferior wall     Femoral artery stenosis, right (HCC) 50-75% stenosis in the common femoral artery.     Mesenteric artery stenosis (HCC)    Median arcuate ligament syndrome (HCC)    Atherosclerosis of native arteries of extremities with intermittent claudication, bilateral legs (HCC)    Symptomatic carotid artery stenosis, left    Pulmonary nodule 7 mm groundglass opacity in the right upper lobe, unchanged since October 2019    Stenosis of left anterior descending artery Mid LAD 50-60% stenosis    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    Malignant neoplasm of overlapping sites of bladder (HCC)    Cervical radiculopathy    Stage 4 chronic kidney disease ([address])    Hypertensive kidney disease with stage 4 chronic kidney disease (HCC)    Embolism and 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)    Depression, recurrent (HCC)    Obesity, morbid ([address])    Diabetes ([address])    Type 2 diabetes mellitus with diabetic peripheral angiopathy without gangrene ([address])    Gastrointestinal stromal tumor (GIST) ([address])    History of gastrointestinal stromal tumor (GIST)    Secondary hyperparathyroidism of renal origin ([address])    Aortoiliac occlusive disease ([address])    Hypertensive urgency    Dysarthria    Chronic kidney disease       Past Medical History:   Diagnosis Date    Arthritis     Chronic kidney disease     Endometriosis     High cholesterol     Hypertension     Kidney disease, chronic, stage III (moderate, EGFR 30-59 ml/min) (HCC)     Lumbar disc herniation     Neuropathy     Peripheral vascular disease (HCC)     Pneumonia     Shoulder injury     left    Spinal stenosis     Stroke ([address] [facility]) 2015    Memory loss       Past Surgical History:   Procedure Laterality Date    CARDIAC CATHETERIZATION      COLONOSCOPY      FL RETROGRADE PYELOGRAM  4/6/2020    FRACTURE SURGERY Right     ankle    OVARIAN CYST SURGERY      WI CYSTO BLADDER W/URETERAL CATHETERIZATION Bilateral 4/6/2020    Procedure: CYSTOSCOPY WITH RETROGRADE PYELOGRAM;  Surgeon: Stalin Cedillo MD;  [facility];  Service: Urology    WI CYSTO W/INSERT URETERAL STENT Right 4/6/2020    Procedure: INSERTION STENT URETERAL;  Surgeon: Stalin Cedillo MD;  [facility];  Service: Urology    WI CYSTO W/REMOVAL OF TUMORS SMALL N/A 4/6/2020    Procedure: TRANSURETHRAL RESECTION OF BLADDER TUMOR (TURBT);   Surgeon: Stalin Cedillo MD;  [facility];  Service: Urology    ROTATOR CUFF REPAIR Left     TONSILLECTOMY          08/16/23 0813   OT Last Visit   OT Visit Date 08/16/23   Note Type   Note type Evaluation   Additional Comments Pt agreeable to OT eval. Upon arrival pt standing at EOB attempting to complete UB dressing. Pain Assessment   Pain Assessment Tool 0-10   Pain Score No Pain   Restrictions/Precautions   Weight Bearing Precautions Per Order No   Other Precautions Chair Alarm; Bed Alarm;Cognitive; Fall Risk;Telemetry   Home Living   Type of [address] One level;Performs ADLs on one level; Able to live on main level with bedroom/bathroom; Ramped entrance   Critical access hospital  ([facility])   Bathroom Equipment Built-in shower seat;Commode   [address] Walker;Cane;Wheelchair-manual  (pt reports utilizing all 3 devices in house intermittenly)   Prior Function   Level of [place] Independent with ADLs; Independent with functional mobility; Needs assistance with IADLS   Lives With Son  (& his girlfriend)   Receives Help From Family   IADLs Family/Friend/Other provides meals; Family/Friend/Other provides transportation; Family/Friend/Other provides medication management   Falls in the last 6 months 0   Vocational Retired   Lifestyle   Autonomy Patient reporting being independent with ADLs and lives with son in a one level house   Reciprocal Relationships Son   Service to Others Retired   [address]  [address] [facility] [address]  [address] 3  Moderate Assistance   [address]; Increased time to complete;Pull over head;Pull down in back  (Pt donned overhead T-shirt while standing)   LB Dressing Assistance 3  Moderate Assistance   Toileting Assistance  4  Minimal Assistance   Additional Comments ADL levels based on functional performance during OT eval   Bed Mobility   Additional Comments DNT bed mobility: pt standing upon arrival and seated in recliner at end of session   Transfers   Sit to Stand 4  Minimal assistance   Additional items Assist x 1;Bedrails; Increased time required;Verbal cues   Stand to Sit 4  Minimal assistance   Additional items Assist x 1; Armrests; Increased time required;Verbal cues   Additional Comments Pt requires verbal cues for safety, sequencing, and task focus. Functional Mobility   Functional Mobility 4  Minimal assistance   Additional Comments Pt ambulated in room with no overt SOB. Pt requires cues for environment navigation d/t visual impairment. Pt requires assistance to manage RW. Pt reports that walking is "hard"   Additional items Rolling walker   Balance   Static Sitting Fair +   Dynamic Sitting Fair   Static Standing Fair -   Dynamic Standing Poor +   Activity Tolerance   Activity Tolerance Patient limited by fatigue   Medical Staff Made Aware Pt seen as a co-eval with PT due to the patient's co-morbidities and clinically unstable presentation indicated by chart review. RUE Assessment   RUE Assessment WFL  (full AROM, 4-/5 MMT)   LUE Assessment   LUE Assessment WFL  (full AROM, 4-/5 MMT)   Hand Function   Gross Motor Coordination Impaired   Fine Motor Coordination Impaired   Sensation   Light Touch No apparent deficits  (BUE tested)   Vision-Basic Assessment   Current Vision Wears glasses all the time   Patient Visual Report Past pointing/reaching;Balance difficulty  (Pt noted to be unable to see out of R eye. Unble to close eye, visually track, or read out of R eye.)   Vision - Complex Assessment   Acuity Able to read employee name badge without difficulty; Able to read clock/calendar on wall without difficulty  (c both eyes open)   Cognition   Overall Cognitive Status Impaired   Arousal/Participation Alert; Responsive; Cooperative   Attention Attends with cues to redirect   Orientation Level Oriented to person;Disoriented to time;Disoriented to situation;Oriented to place  (Oriented to year but disoriented to month)   Memory Decreased recall of recent events;Decreased recall of precautions   Following Commands Follows one step commands with increased time or repetition   Comments Pt demonstrates impulsivity c transfers and decreased safety awareness throughout all functional mobility. Patient requires significantly increased time for processing and repetition of directions. Unclear cognitive baseline. Recommend pt be alarmed at all times to prevent falls. Plan to complete formal cognitive assessment to assist with safe discharge planning. Assessment   Limitation Decreased ADL status; Decreased UE strength;Decreased Safe judgement during ADL;Decreased cognition;Decreased endurance;Decreased self-care trans;Decreased high-level ADLs; Decreased fine motor control;Visual deficit   Prognosis Good   Assessment Patient is a 70 y.o. female seen for OT evaluation s/p admit to [facility]  on 8/15/2023 w/Dysarthria. Commorbidities affecting patient's functional performance at time of assessment include: basilar artery stenosis, CVA, GERD, PVD, HTN, and CKD. Orders placed for OT evaluation and treatment and up and OOB as tolerated . Performed at least two patient identifiers during session including name and wristband. Prior to admission, Patient reporting being independent with ADLs and lives with son in a one level house. Personal factors affecting patient at time of initial evaluation include: limited insight into deficits, difficulty performing ADLs and difficulty performing IADLs. Upon evaluation, patient requires minimal  and moderate assist for UB ADLs, minimal  and moderate assist for LB ADLs, transfers and functional ambulation in room and bathroom with minimal assist, with the use of Rolling Walker. Patient is oriented to name,, oriented to place,, disoriented to time, and disoriented to situation, and presents with impaired judgement, inability to make safe decisions.   Occupational performance is affected by the following deficits: visual impairment, orientation, decreased muscle strength, dynamic sit/ stand balance deficit with poor standing tolerance time for self care and functional mobility, decreased activity tolerance, impaired memory, impaired problem solving, decreased safety awareness and delayed righting and equilibrium reactions. Patient to benefit from continued Occupational Therapy treatment while in the hospital to address deficits as defined above and maximize level of functional independence with ADLs and functional mobility. Occupational Performance areas to address include: grooming , bathing/ shower, dressing, toilet hygiene, transfer to all surfaces and functional ambulation. From OT standpoint, recommendation at time of d/c would be Post acute rehabilitation services. Goals   Patient Goals to get better   Plan   Treatment Interventions Visual perceptual retraining;ADL retraining;Functional transfer training;UE strengthening/ROM; Endurance training;Cognitive reorientation;Patient/family training;Equipment evaluation/education; Fine motor coordination activities; Compensatory technique education; Activityengagement   Goal Expiration Date 08/31/23   OT Treatment Day 0   OT Frequency 3-5x/wk   Recommendation   OT Discharge Recommendation Post acute rehabilitation services   AM-PAC Daily Activity Inpatient   Lower Body Dressing 2   Bathing 2   Toileting 3   Upper Body Dressing 3   Grooming 3   Eating 3   Daily Activity Raw Score 16   Daily Activity Standardized Score (Calc for Raw Score >=11) 35.96   AM-PAC Applied Cognition Inpatient   Following a Speech/Presentation 2   Understanding Ordinary Conversation 3   Taking Medications 2   Remembering Where Things Are Placed or Put Away 1   Remembering List of 4-5 Errands 1   Taking Care of Complicated Tasks 2   Applied Cognition Raw Score 11   Applied Cognition Standardized Score 27.03   Modified [place] Scale   Modified [place] Scale 4   End of Consult   Patient Position at End of Consult Bedside chair;Bed/Chair alarm activated; All needs within reach   Nurse Communication Nurse aware of consult     GOALS:    *ADL transfers with (I) for inc'd independence with ADLs/purposeful tasks    *UB ADL with (I) for inc'd independence with self cares    *LB ADL with (S) using AE prn for inc'd independence with self cares    *Toileting with (S) for clothing management and hygiene for return to PLOF with personal care    *Increase stand tolerance x 5  m for inc'd tolerance with standing purposeful tasks    *Participate in 10m UE therex to increase overall stamina/activity tolerance for purposeful tasks    *Bed mobility- (I) for inc'd independence to manage own comfort and initiate EOB & OOB purposeful tasks    *Patient will verbalize 3 safety awareness/ principles to prevent falls in the home setting. *Patient will increase OOB/sitting tolerance to 2-4 hours per day to increase participation in self-care and leisure tasks with no s/s of exertion. *Patient will engage in ongoing cognitive assessment to assist with safe discharge planning/recommendations.      Angelo Mcclelland, LESLIE, OTR/L

## 2023-08-16 NOTE — PLAN OF CARE
Problem: OCCUPATIONAL THERAPY ADULT  Goal: Performs self-care activities at highest level of function for planned discharge setting. See evaluation for individualized goals. Description: Treatment Interventions: Visual perceptual retraining, ADL retraining, Functional transfer training, UE strengthening/ROM, Endurance training, Cognitive reorientation, Patient/family training, Equipment evaluation/education, Fine motor coordination activities, Compensatory technique education, Activityengagement          See flowsheet documentation for full assessment, interventions and recommendations. Note: Limitation: Decreased ADL status, Decreased UE strength, Decreased Safe judgement during ADL, Decreased cognition, Decreased endurance, Decreased self-care trans, Decreased high-level ADLs, Decreased fine motor control, Visual deficit  Prognosis: Good  Assessment: Patient is a 70 y.o. female seen for OT evaluation s/p admit to [facility]  on 8/15/2023 w/Dysarthria. Commorbidities affecting patient's functional performance at time of assessment include: basilar artery stenosis, CVA, GERD, PVD, HTN, and CKD. Orders placed for OT evaluation and treatment and up and OOB as tolerated . Performed at least two patient identifiers during session including name and wristband. Prior to admission, Patient reporting being independent with ADLs and lives with son in a one level house. Personal factors affecting patient at time of initial evaluation include: limited insight into deficits, difficulty performing ADLs and difficulty performing IADLs. Upon evaluation, patient requires minimal  and moderate assist for UB ADLs, minimal  and moderate assist for LB ADLs, transfers and functional ambulation in room and bathroom with minimal assist, with the use of Rolling Walker.   Patient is oriented to name,, oriented to place,, disoriented to time, and disoriented to situation, and presents with impaired judgement, inability to make safe decisions. Occupational performance is affected by the following deficits: visual impairment, orientation, decreased muscle strength, dynamic sit/ stand balance deficit with poor standing tolerance time for self care and functional mobility, decreased activity tolerance, impaired memory, impaired problem solving, decreased safety awareness and delayed righting and equilibrium reactions. Patient to benefit from continued Occupational Therapy treatment while in the hospital to address deficits as defined above and maximize level of functional independence with ADLs and functional mobility. Occupational Performance areas to address include: grooming , bathing/ shower, dressing, toilet hygiene, transfer to all surfaces and functional ambulation. From OT standpoint, recommendation at time of d/c would be Post acute rehabilitation services.      OT Discharge Recommendation: Post acute rehabilitation services     [facility] OTD, OTR/L

## 2023-08-16 NOTE — SPEECH THERAPY NOTE
Speech/Language Assessment    Patient Name: Cecilia Barahona 70 y.o. Today's Date: 8/16/2023      Problem List  Principal Problem:    Dysarthria  Active Problems:    Basilar artery stenosis    CVA (cerebral vascular accident) ([address])    Chronic GERD    Claudication in peripheral vascular disease ([address])    Hypertension    Chronic kidney disease    Aphasia as late effect of cerebrovascular accident (CVA)    Past Medical History  Past Medical History:   Diagnosis Date   • Arthritis    • Chronic kidney disease    • Endometriosis    • High cholesterol    • Hypertension    • Kidney disease, chronic, stage III (moderate, EGFR 30-59 ml/min) ([facility])    • Lumbar disc herniation    • Neuropathy    • Peripheral vascular disease ([facility])    • Pneumonia    • Shoulder injury     left   • Spinal stenosis    • Stroke ([address]) 2015    Memory loss     Past Surgical History  Past Surgical History:   Procedure Laterality Date   • CARDIAC CATHETERIZATION     • COLONOSCOPY     • FL RETROGRADE PYELOGRAM  4/6/2020   • FRACTURE SURGERY Right     ankle   • OVARIAN CYST SURGERY     • WV CYSTO BLADDER W/URETERAL CATHETERIZATION Bilateral 4/6/2020    Procedure: CYSTOSCOPY WITH RETROGRADE PYELOGRAM;  Surgeon: Olga Kenny MD;  [facility];  Service: Urology   • WV CYSTO W/INSERT URETERAL STENT Right 4/6/2020    Procedure: INSERTION STENT URETERAL;  Surgeon: Olga Kenny MD;  [facility];  Service: Urology   • WV CYSTO W/REMOVAL OF TUMORS SMALL N/A 4/6/2020    Procedure: TRANSURETHRAL RESECTION OF BLADDER TUMOR (TURBT);   Surgeon: Olga Kenny MD;  [facility];  Service: Urology   • ROTATOR CUFF REPAIR Left    • TONSILLECTOMY         CURRENT MEDICAL:  Cecilia Barahona is a 70 y.o. female with hypertension, hyperlipidemia, CKD, aortoiliac atherosclerotic occlusive disease, renal artery stenosis, peripheral arterial disease and carotid artery disease who recently suffered left occipital stroke with right hand weakness and some speech difficulties. She was seen in the hospital  where imagining shows L acute to subacute ischemia in the L PCA. She was found to have worsening carotid artery stenosis from 50% to >80% in one year. Carotid intervention has been recommended with L TCAR by Dr. Bea Lopez in short interval (approxiamately 2-4 weeks) after placement on optimal medical therapy. She had not been taking her aspirin and was intolerant to statins. On discharge she was apparently agreeable to aspirin, ticagrelor and pravastatin. She was also prescribed PCSK9 inhibitor which she received but has not been started. Patient was discharged with plan for TCAR. She had declined recommended rehab. At home she initially seemed to be improving but developed increased confusion for several days, two falls and worsening dysarthria for which son brought her into the hospital. She is moving all extremities and speaking but difficulty finding or forming words. AUDITORY COMPREHENSION:  Body part ID: 5/5  Object/Picture ID: 4/5  Simple yes/no ? 's: 5/5  Complex y/n ?'s: 4/5  One step commands: 5/5  Two step commands: 4/5  Complex or 3 step commands:1/5 - poor   Paragraph Comprehension: Appears functional/adequate     READING COMPREHENSION:  Not assessed at this time    VERBAL EXPRESSION/EXPR LANGUAGE:  Auto Sequences:   Counting 1-10: 10/10   Days of Week:    Months of Year: 10/12  Word Repetition:  5/5  Phrase Completion: 5/5  Confrontation Namin/5  Responsive Namin/5  Picture Description: 3/10- poor   Ability to make needs known: adequate - poor  Conversation: poor    WRITTEN EXPRESSION:  Not asessed     ORAL MOTOR/SPEECH MECHANISM EXAM:    MOTOR SPEECH:  Dysarthria:Breath support: WFL   Volume: WFL   Articulatory Precision: WFL   Rate: hesitations noted.  Suspected to be 2/2 word retrieval v oral motor impairment    Nasality: WFL     Apraxia:   Oral: not suspected   Verbal: not susepected  Needs further motor speech evaluation: Not at this time     Cognitive -linguistic skills:  ?able impairment   Further evaluation suggested  *Assessment limited by pt emotion and increased frustration     Symptoms noted:  Distractable  Gaze preference to the - left; pt c/o visual field cut (R>L)  Phonemic paraphasias  Semantic paraphasias  Perseveration  Tangential  Verbose  Aware aware of deficits: pt demonstrates increased frustration and further perseverates on responses      Summary/Impression:  Pt presents with at least mild-moderate expressive aphasia. Severity noted to worsen with connected speaking tasks, especially at the conversation level. Word finding difficulties noted resulting in hesitations, perseveration, semantic and phonemic paraphasias. Perseveration increases in frequency w/ frustration, specifically when pt is unable to relay her message. Expressive language mostly telegraphic in nature with impaired syntax when attempting to increase phrases length. Pt appears to respond best when encouraged to use key words to respond v. complex sentences and phrases. Consider rephrasing questions to meet this need (ie yes/no, 1-2 word answers needed) and reduce increased frustration for the patient. Receptive language appears mostly intact based off of limited assessment tasks today. However, an increased processing time to comprehend the speaker is suspected, in conjunction with impulsivity, which appears to further exacerbate pt's level of frustration when attempting to provide a response. Consider rephrasing questions to contain key words, short in nature, and provide increased time for the patient comprehed and formulate a response.            Treatment Recommended and Frequency:  Will follow up in the acute care setting 1-3x   Discharge: post acute rehab services     Impression and Recommendations reviewed with:  MD, Patient     Treatment Goals:  Patient will verbalize understanding and demonstrate use of word retrieval strategies in conversational speech with models and cues as need x1-3    Education Initiated with:  Patient; understanding verbalized but will benefit from further reinforcement     Therapy Prognosis: fair-good  Prognosis considerations: pt motivation for participation in tx     Divina Moreno, [address] [facility] Pathologist  [place] #[id number]  [place] #[id number]

## 2023-08-16 NOTE — PLAN OF CARE
Summary/Impression:  Pt presents with at least mild-moderate expressive aphasia. Severity noted to worsen with connected speaking tasks, especially at the conversation level. Word finding difficulties noted resulting in hesitations, perseveration, semantic and phonemic paraphasias. Perseveration increases in frequency w/ frustration, specifically when pt is unable to relay her message. Expressive language mostly telegraphic in nature with impaired syntax when attempting to increase phrases length. Pt appears to respond best when encouraged to use key words to respond v. complex sentences and phrases. Consider rephrasing questions to meet this need (ie yes/no, 1-2 word answers needed) and reduce increased frustration for the patient. Receptive language appears mostly intact based off of limited assessment tasks today. However, an increased processing time to comprehend the speaker is suspected, in conjunction with impulsivity, which appears to further exacerbate pt's level of frustration when attempting to provide a response. Consider rephrasing questions to contain key words, short in nature, and provide increased time for the patient comprehed and formulate a response.

## 2023-08-17 ENCOUNTER — APPOINTMENT (INPATIENT)
Dept: NUCLEAR MEDICINE | Facility: HOSPITAL | Age: 78
DRG: 065 | End: 2023-08-17
Payer: COMMERCIAL

## 2023-08-17 ENCOUNTER — APPOINTMENT (INPATIENT)
Dept: NON INVASIVE DIAGNOSTICS | Facility: HOSPITAL | Age: 78
DRG: 065 | End: 2023-08-17
Payer: COMMERCIAL

## 2023-08-17 LAB
ANION GAP SERPL CALCULATED.3IONS-SCNC: 10 MMOL/L
BASOPHILS # BLD AUTO: 0.06 THOUSANDS/ÂΜL (ref 0–0.1)
BASOPHILS NFR BLD AUTO: 1 % (ref 0–1)
BUN SERPL-MCNC: 26 MG/DL (ref 5–25)
CALCIUM SERPL-MCNC: 9.1 MG/DL (ref 8.4–10.2)
CHLORIDE SERPL-SCNC: 108 MMOL/L (ref 96–108)
CO2 SERPL-SCNC: 20 MMOL/L (ref 21–32)
CREAT SERPL-MCNC: 1.69 MG/DL (ref 0.6–1.3)
EOSINOPHIL # BLD AUTO: 0.32 THOUSAND/ÂΜL (ref 0–0.61)
EOSINOPHIL NFR BLD AUTO: 4 % (ref 0–6)
ERYTHROCYTE [DISTWIDTH] IN BLOOD BY AUTOMATED COUNT: 14 % (ref 11.6–15.1)
GFR SERPL CREATININE-BSD FRML MDRD: 29 ML/MIN/1.73SQ M
GLUCOSE SERPL-MCNC: 149 MG/DL (ref 65–140)
GLUCOSE SERPL-MCNC: 184 MG/DL (ref 65–140)
GLUCOSE SERPL-MCNC: 187 MG/DL (ref 65–140)
GLUCOSE SERPL-MCNC: 196 MG/DL (ref 65–140)
GLUCOSE SERPL-MCNC: 209 MG/DL (ref 65–140)
HCT VFR BLD AUTO: 37.7 % (ref 34.8–46.1)
HGB BLD-MCNC: 13.3 G/DL (ref 11.5–15.4)
IMM GRANULOCYTES # BLD AUTO: 0.05 THOUSAND/UL (ref 0–0.2)
IMM GRANULOCYTES NFR BLD AUTO: 1 % (ref 0–2)
LYMPHOCYTES # BLD AUTO: 1.44 THOUSANDS/ÂΜL (ref 0.6–4.47)
LYMPHOCYTES NFR BLD AUTO: 19 % (ref 14–44)
MCH RBC QN AUTO: 30 PG (ref 26.8–34.3)
MCHC RBC AUTO-ENTMCNC: 35.3 G/DL (ref 31.4–37.4)
MCV RBC AUTO: 85 FL (ref 82–98)
MONOCYTES # BLD AUTO: 0.74 THOUSAND/ÂΜL (ref 0.17–1.22)
MONOCYTES NFR BLD AUTO: 10 % (ref 4–12)
NEUTROPHILS # BLD AUTO: 5.13 THOUSANDS/ÂΜL (ref 1.85–7.62)
NEUTS SEG NFR BLD AUTO: 65 % (ref 43–75)
NRBC BLD AUTO-RTO: 0 /100 WBCS
PLATELET # BLD AUTO: 243 THOUSANDS/UL (ref 149–390)
PMV BLD AUTO: 10.4 FL (ref 8.9–12.7)
POTASSIUM SERPL-SCNC: 3.4 MMOL/L (ref 3.5–5.3)
RBC # BLD AUTO: 4.43 MILLION/UL (ref 3.81–5.12)
SODIUM SERPL-SCNC: 138 MMOL/L (ref 135–147)
WBC # BLD AUTO: 7.74 THOUSAND/UL (ref 4.31–10.16)

## 2023-08-17 PROCEDURE — 85025 COMPLETE CBC W/AUTO DIFF WBC: CPT | Performed by: INTERNAL MEDICINE

## 2023-08-17 PROCEDURE — 99232 SBSQ HOSP IP/OBS MODERATE 35: CPT | Performed by: PSYCHIATRY & NEUROLOGY

## 2023-08-17 PROCEDURE — 82948 REAGENT STRIP/BLOOD GLUCOSE: CPT

## 2023-08-17 PROCEDURE — 99232 SBSQ HOSP IP/OBS MODERATE 35: CPT

## 2023-08-17 PROCEDURE — 99223 1ST HOSP IP/OBS HIGH 75: CPT | Performed by: INTERNAL MEDICINE

## 2023-08-17 PROCEDURE — 80048 BASIC METABOLIC PNL TOTAL CA: CPT | Performed by: INTERNAL MEDICINE

## 2023-08-17 PROCEDURE — 99233 SBSQ HOSP IP/OBS HIGH 50: CPT | Performed by: INTERNAL MEDICINE

## 2023-08-17 RX ORDER — CHLORAL HYDRATE 500 MG
1000 CAPSULE ORAL 3 TIMES DAILY
Status: DISCONTINUED | OUTPATIENT
Start: 2023-08-17 | End: 2023-08-19 | Stop reason: HOSPADM

## 2023-08-17 RX ORDER — ALPRAZOLAM 0.25 MG/1
0.25 TABLET ORAL ONCE
Status: COMPLETED | OUTPATIENT
Start: 2023-08-17 | End: 2023-08-17

## 2023-08-17 RX ORDER — POTASSIUM CHLORIDE 20MEQ/15ML
40 LIQUID (ML) ORAL ONCE
Status: COMPLETED | OUTPATIENT
Start: 2023-08-17 | End: 2023-08-17

## 2023-08-17 RX ADMIN — OMEGA-3 FATTY ACIDS CAP 1000 MG 1000 MG: 1000 CAP at 17:00

## 2023-08-17 RX ADMIN — TICAGRELOR 90 MG: 90 TABLET ORAL at 08:49

## 2023-08-17 RX ADMIN — POTASSIUM CHLORIDE 40 MEQ: 1.5 SOLUTION ORAL at 08:51

## 2023-08-17 RX ADMIN — ASPIRIN 81 MG: 81 TABLET, CHEWABLE ORAL at 08:49

## 2023-08-17 RX ADMIN — ALPRAZOLAM 0.25 MG: 0.25 TABLET ORAL at 12:20

## 2023-08-17 RX ADMIN — PRAVASTATIN SODIUM 80 MG: 80 TABLET ORAL at 17:30

## 2023-08-17 RX ADMIN — OMEGA-3 FATTY ACIDS CAP 1000 MG 1000 MG: 1000 CAP at 08:49

## 2023-08-17 RX ADMIN — HEPARIN SODIUM 5000 UNITS: 5000 INJECTION INTRAVENOUS; SUBCUTANEOUS at 05:32

## 2023-08-17 RX ADMIN — LABETALOL HYDROCHLORIDE 300 MG: 100 TABLET, FILM COATED ORAL at 17:30

## 2023-08-17 RX ADMIN — INSULIN LISPRO 1 UNITS: 100 INJECTION, SOLUTION INTRAVENOUS; SUBCUTANEOUS at 08:00

## 2023-08-17 RX ADMIN — OMEGA-3 FATTY ACIDS CAP 1000 MG 1000 MG: 1000 CAP at 21:28

## 2023-08-17 RX ADMIN — HEPARIN SODIUM 5000 UNITS: 5000 INJECTION INTRAVENOUS; SUBCUTANEOUS at 13:12

## 2023-08-17 RX ADMIN — HEPARIN SODIUM 5000 UNITS: 5000 INJECTION INTRAVENOUS; SUBCUTANEOUS at 21:28

## 2023-08-17 RX ADMIN — CINACALCET 30 MG: 30 TABLET, FILM COATED ORAL at 08:49

## 2023-08-17 RX ADMIN — TICAGRELOR 90 MG: 90 TABLET ORAL at 21:28

## 2023-08-17 RX ADMIN — ALLOPURINOL 100 MG: 100 TABLET ORAL at 08:49

## 2023-08-17 RX ADMIN — INSULIN LISPRO 1 UNITS: 100 INJECTION, SOLUTION INTRAVENOUS; SUBCUTANEOUS at 12:00

## 2023-08-17 RX ADMIN — LABETALOL HYDROCHLORIDE 300 MG: 100 TABLET, FILM COATED ORAL at 08:49

## 2023-08-17 RX ADMIN — AMLODIPINE BESYLATE 10 MG: 10 TABLET ORAL at 08:49

## 2023-08-17 RX ADMIN — INSULIN LISPRO 1 UNITS: 100 INJECTION, SOLUTION INTRAVENOUS; SUBCUTANEOUS at 17:00

## 2023-08-17 NOTE — ARC ADMISSION
Reviewed patient case with [facility] physician this morning. Patient has been approved for [facility] pending medical stability, insurance auth and bed availability. CM has been updated.

## 2023-08-17 NOTE — CASE MANAGEMENT
Case Management Discharge Planning Note    Patient name Brijesh Mckeon  Location [address] [facility] [id number] MRN [id number]  : 1945 Date 2023       Current Admission Date: 8/15/2023  Current Admission [address]   Patient Active Problem List    Diagnosis Date Noted   • Aphasia as late effect of cerebrovascular accident (CVA) 2023   • Dysarthria 08/15/2023   • Chronic kidney disease    • Hypertensive urgency 2023   • Aortoiliac occlusive disease ([address]) 10/11/2022   • History of gastrointestinal stromal tumor (GIST) 2022   • Secondary hyperparathyroidism of renal origin ([address]) 2022   • 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   • Diabetes ([address]) 2021   • Type 2 diabetes mellitus with diabetic peripheral angiopathy without gangrene ([address]) 2021   • Embolism and 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 ([address]) 2021   • Depression, recurrent ([address]) 2021   • Obesity, morbid ([address]) 2021   • Stage 4 chronic kidney disease ([address]) 2020   • Hypertensive kidney disease with stage 4 chronic kidney disease ([address]) 2020   • Cervical radiculopathy 2020   • Malignant neoplasm of overlapping sites of bladder ([address]) 2020   • Stenosis of left anterior descending artery Mid LAD 50-60% stenosis 2020   • 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  2020   • Mesenteric artery stenosis (HCC) 2020   • Positive cardiac stress test  small, mildly severe, partially reversible myocardial perfusion defect of anterior and inferior wall  11/12/2019   • Abnormal CT of the chest suspicious for an infectious or inflammatory bronchiolitis. 11/07/2019   • Celiac artery stenosis (HCC) >70% stenosis in the celiac trunk 11/05/2019   • Median arcuate ligament syndrome ([address]) 11/05/2019   • Atherosclerosis of renal artery ([address]) 07/01/2019   • Aortoiliac stenosis, left (HCC) 02/25/2019   • Spondylosis of lumbar region without myelopathy or radiculopathy 01/29/2019   • Lumbosacral spondylosis without myelopathy 01/29/2019   • Statin intolerance 01/22/2019   • Lumbar disc herniation 01/22/2019   • Herniated lumbar intervertebral disc 01/22/2019   • Chronic GERD 12/04/2017   • Claudication in peripheral vascular disease ([address]) 12/04/2017   • Gout 12/04/2017   • Hx-TIA (transient ischemic attack) 12/04/2017   • Hyperlipidemia associated with type 2 diabetes mellitus ([address]) 12/04/2017   • Hypertension associated with diabetes ([address]) 12/04/2017   • Osteoarthritis 12/04/2017   • Obesity (BMI 30-39.9) 12/04/2017   • Right renal artery stenosis ([address]) 12/04/2017   • 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   • Hyperlipidemia, unspecified 12/04/2017   • Vitamin D deficiency 09/08/2016   • Basilar artery stenosis 06/22/2016   • Type 2 diabetes mellitus with diabetic nephropathy ([address]) 12/19/2015   • Hypercholesteremia 12/19/2015   • Hypertension 12/19/2015   • Polyneuropathy 12/19/2015   • CVA (cerebral vascular accident) ([address]) 11/09/2015      LOS (days): 1  Geometric Mean LOS (GMLOS) (days): 2.90  Days to GMLOS:1.8     OBJECTIVE:  Risk of Unplanned Readmission Score: 18.77         Current admission status: Inpatient   Preferred Pharmacy:   [address] [place] [id number]  [address] [address] [place] [address]  Phone: [phone] Fax: [phone]    [facility]/pharmacy #[id number] [address] [place] - [address] [facility] [place] [address]  Phone: [phone] Fax: [phone]    Primary Care Provider: Hero Tripp MD    Primary Insurance: [facility] H[facility]PITAL REP  Secondary Insurance:     DISCHARGE DETAILS:                                          Other Referral/Resources/Interventions Provided:  Referral Comments: Pt w bed offers from [facility] and [facility]. Visited her at bedside and made her aware; also informed her that facilities want to know plan for possible carotid surgery. T/C to son Alisson Stewart to update;  made him aware that AD/LW packet is at pt's bedside, also provider choice list printed from [facility]. Son reports he was called by both facilities and his preference is for [facility]. Agency reserved in [facility]. Per son, pt is having carotid surgery on 8/28;  he is not sure of the approach to be used but says pt is scheduled for [employer]. Made him aware that acute rehab will not allow pt to have procedure during rehab stay. TT to Dr. Amador Brizuela. He reports pt needs cardiac clearance prior to scheduling of CEA/TCAR;  pt for cardiac stress test, echo, loop recorder. MD says if pt completes w/u tomorrow and it is WNL, she might be cleared for d/c Fri or Sat. Message sent to Indra Aguirre via [facility];  requested NPIs and MD name in order to initiate referral w [facility]. Also asked if facility would accept pt over w/e if she is cleared;  awaiting response.          Treatment Team Recommendation: Short Term Rehab  Discharge Destination Plan[de-identified] Short Term Rehab  Transport at Discharge : Family, Wheelchair Trish

## 2023-08-17 NOTE — PROGRESS NOTES
[address]  Progress Note  Name: Mallorie Huerta  MRN: [id number]  Unit/Bed#: -01 I Date of Admission: 8/15/2023   Date of Service: 8/17/2023 I Hospital Day: 1    Assessment/Plan   CVA (cerebral vascular accident) [facility])  Assessment & Plan  70year old female, former smoker, admitted 8/15/23 for expressive aphasia and persistent right-sided weakness. Patient was previously admitted 7/27/23 for similar symptoms and was found to have L MCA territory infarct, as well as acute to subacute ischemia of left PCA distribution. Evaluation at that time revealed severe bilateral carotid artery stenosis (Estimated 85-90% left and 80% right proximal ICA stenosis). Vascular surgery recommended DAPT, aggressive management of dyslipidemia with PCSK9 inhibitors, and intervention in 4 weeks. PCSK9 inhibitors unavailable, therefore patient was initiated on high intensity pravastatin. Neurology is following and believes worsening symptoms to be related to recrudescence of previous infarcts. Imaging:  CT head wo contrast 8/15/23:  Evolving subacute left nonhemorrhagic posterior cerebral artery distribution infarction    MRI brain wo contrast 8/16/23  Acute punctate infarcts in the left occipital lobe superimposed on the previously demonstrated subacute to early chronic infarct. Punctate acute infarcts in the left MCA territory. Plan:  -Patient will benefit from L TCAR. Tentatively scheduled for 8/28/23.  -Cardiology consult pending, awaiting cardiac risk stratification and recommendations.  -Continue medical optimization with aspirin, ticagrelor, and pravastatin. -RICARDO on admission, improving  -Continue with clinical monitoring  -Appreciate neurology input   -PT/OT/speech; Case management coordinating disposition to acute rehab facility.  -Continue management of multiple comorbidities per SLIM  -Will d/w Dr. Ramesh Sage.     Addendum: After discussion with neurology, they are requesting that L TCAR be completed ASAP due to new infarcts noted on MRI. Patient is scheduled for L TCAR 8/22/23 at [facility] pending cardiac clearance    Subjective:  Patient resting comfortably in bed. Denying any concerns at this time. She continues with expressive aphasia and difficulty forming sentences. Reports continued improvement in right upper extremity weakness. Vitals:  /78 (BP Location: Left arm)   Pulse 78   Temp (!) 97.4 °F (36.3 °C) (Oral)   Resp 18   Ht 4' 10" (1.473 m)   Wt 72.6 kg (160 lb)   LMP  (LMP Unknown)   SpO2 93%   BMI 33.44 kg/m²     I/Os:  No intake/output data recorded. No intake/output data recorded. Lab Results and Cultures:   CBC with diff:   Lab Results   Component Value Date    WBC 7.74 08/17/2023    HGB 13.3 08/17/2023    HCT 37.7 08/17/2023    MCV 85 08/17/2023     08/17/2023    RBC 4.43 08/17/2023    MCH 30.0 08/17/2023    MCHC 35.3 08/17/2023    RDW 14.0 08/17/2023    MPV 10.4 08/17/2023    NRBC 0 08/17/2023   ,   BMP/CMP:  Lab Results   Component Value Date    SODIUM 138 08/17/2023    SODIUM 138 10/20/2020    K 3.4 (L) 08/17/2023    K 3.8 10/20/2020     08/17/2023    CL 98 10/20/2020    CO2 20 (L) 08/17/2023    CO2 23 10/20/2020    BUN 26 (H) 08/17/2023    BUN 45 (H) 10/20/2020    CREATININE 1.69 (H) 08/17/2023    CALCIUM 9.1 08/17/2023    AST 14 08/15/2023    AST 14 09/08/2020    ALT 15 08/15/2023    ALT 13 09/08/2020    ALKPHOS 87 08/15/2023    EGFR 29 08/17/2023   ,   Lipid Panel: No results found for: "CHOL",   Coags:   Lab Results   Component Value Date    PTT 29 01/22/2022    INR 1.07 01/22/2022   ,     Blood Culture:   Lab Results   Component Value Date    BLOODCX No Growth After 5 Days.  01/22/2022   ,   Urinalysis:   Lab Results   Component Value Date    COLORU Yellow 08/05/2023    CLARITYU Turbid 08/05/2023    SPECGRAV 1.018 08/05/2023    PHUR 5.5 08/05/2023    LEUKOCYTESUR Large (A) 08/05/2023    NITRITE Negative 08/05/2023    GLUCOSEU Negative 08/05/2023 KETONESU Negative 08/05/2023    BILIRUBINUR Negative 08/05/2023    BLOODU Negative 08/05/2023   ,   Urine Culture:   Lab Results   Component Value Date    URINECX >100,000 cfu/ml - Presumptive Escherichia coli (A) 04/06/2022   ,   Wound Culure: No results found for: "WOUNDCULT"    Medications:  Current Facility-Administered Medications   Medication Dose Route Frequency   • allopurinol (ZYLOPRIM) tablet 100 mg  100 mg Oral Daily   • amLODIPine (NORVASC) tablet 10 mg  10 mg Oral Daily   • aspirin chewable tablet 81 mg  81 mg Oral Daily   • cinacalcet (SENSIPAR) tablet 30 mg  30 mg Oral Every Other Day   • cloNIDine (CATAPRES-TTS-3) 0.3 mg/24 hr TD weekly patch  1 patch Transdermal Weekly   • fish oil capsule 1,000 mg  1,000 mg Oral TID   • heparin (porcine) subcutaneous injection 5,000 Units  5,000 Units Subcutaneous Q8H [address]   • hydrALAZINE (APRESOLINE) injection 5 mg  5 mg Intravenous Q6H PRN   • insulin lispro (HumaLOG) 100 units/mL subcutaneous injection 1-5 Units  1-5 Units Subcutaneous TID AC   • insulin lispro (HumaLOG) 100 units/mL subcutaneous injection 1-5 Units  1-5 Units Subcutaneous HS   • labetalol (NORMODYNE) tablet 300 mg  300 mg Oral BID   • pravastatin (PRAVACHOL) tablet 80 mg  80 mg Oral Daily With Dinner   • ticagrelor (BRILINTA) tablet 90 mg  90 mg Oral Q12H [address]       Physical Exam:    General: NAD  CV: regular rate and rhythm, + carotid bruit bilaterally  Respiratory: CTA, normal effort  Abdominal: soft, nontender, nondistended  Extremities: warm and well perfused, RUE 4/5 strength, LUE 5/5 strength  Neurologic: alert and oriented, expressive aphasia.  Trachea is midline, no facial droop noted, no dysphagia      Pulse exam:  Radial: Right: 2+ Left: 2+  Mardella Peon, CRNP  8/17/2023

## 2023-08-17 NOTE — PROGRESS NOTES
Progress Note - Neurology   Hosea Sky 70 y.o. female [id number]  Unit/Bed#: /-01    Assessment/Plan:  CVA (cerebral vascular accident) [facility]  [address] is a 70 y.o. right handed female with DM,HTN, HLD, CKD3, PVD, basilar artery stenosis and recent left PCA/MCA territory stroke on asa/brilinta/statin who presented to the ED 8/15/23 with reported worsening aphasia. Initial /55, . [facility] without acute findings. She was admitted for further work-up with neurology consult    MRI showed new acute left occipital lobe infarcts. No new or worsening symptoms    Relevant home meds:  Asa 81mg daily started 23  brilinta 90mg bid started 23  Pravastatin 80mg daily (Statins listed as allergy for patient (side effect of myalgia). Pt was agreeable to start pravastatin 80 mg daily on 23 with eventual consideration to switch to a PCSK9 inhibitor   Work-up:  Imagin/15/23 CTH: Evolving subacute left nonhemorrhagic posterior cerebral artery distribution infarction. 23 MRI brain:   1. Acute punctate infarcts in the left occipital lobe superimposed on the previously demonstrated subacute to early chronic infarct. 2. Punctate acute infarcts in the left MCA territory  Prior imagin23 carotid US: Impression  RIGHT:  There is 70+% stenosis noted in the internal carotid artery. Plaque is  heterogenous/homogenous and irregular/smooth. Vertebral artery flow is antegrade. There is no significant subclavian artery  disease. LEFT:  There is 70+% stenosis noted in the internal carotid artery. Plaque is  heterogenous/homogenous and irregular/smooth. Vertebral artery flow is antegrade. There is no significant subclavian artery  disease. Compared to previous study on 22 , there is an increase in disease in the  bilateral ICAs  23 MRI: 1. Large area of acute to subacute ischemia left PCA distribution occipital lobe.   2. Small foci of acute to subacute ischemia in the left frontal and left parietal/temporal lobes MCA distribution. 7/27/23 CTA head and neck:    1. Hypodensity and loss of gray-white matter differentiation involving left occipital and inferior parietal lobe concerning for evolving acute or subacute infarct. 2.  Chronic microangiopathic change. 3.  High-grade stenosis of the left PCA at proximal P2 segment. Occluded versus hypoplastic left P1 segment with patent P-comm perfusing left PCA. 4.  Severe left and moderate right bilateral supraclinoid ICA stenosis. 5.  Severe stenosis of the left vertebral artery origin. Patent narrow caliber left vertebral artery in the cervical and pre-PICA intracranial segments with multifocal severe stenosis. Post PICA segment is occluded. 6.  Multifocal high-grade stenosis of the right intracranial vertebral artery. 7.  Advanced atherosclerotic change of bilateral cervical carotid arteries. Estimated 85-90% left and 80% right proximal ICA stenosis. 8.  Atherosclerotic disease of the aortic arch with linear defect at the origin of left subclavian artery. Differentials include ulcerated plaque versus focal dissection flap (likely chronic). Pertinent labs:  Lipid panel: Total cholesterol 200, triglycerides 378, LDL 79 (7/28/23 Total cholesterol 368, triglycerides 329, )  A1c 8.5 (7/27/23 A1c 9.1)    Recommendations:  New embolic appearing left occipital lobe infarcts  - Stroke pathway  • Echo with bubble, NM stress pending  • Continue home Aspirin 81 mg daily and brilinta 90mg bid   • Continue home Pravastatin 80 mg daily  • Normotension, avoid hypotension  • Euglycemic  • Continue telemetry; consider outpatient cardiac event monitor  • PT/OT/ST  • Stroke education  • Continue to monitor and notify neurology with any changes.   • STAT CT head for any acute change in neuro exam  • Pending vascular surgery intervention with TCAR  • Xanax 0.25mg x1 for significant anxiety this morning  - Medical management and correction of any metabolic or infectious disturbances per primary service. Aphasia as late effect of cerebrovascular accident (CVA)  Assessment & Plan        Ev Proper will need follow up in 4-6 weeks with neurovascular attending. She will not require outpatient neurological testing. Subjective:   Patient is very anxious this morning. She is afraid she is going to die    Past Medical History:   Diagnosis Date   • Arthritis    • Chronic kidney disease    • Endometriosis    • High cholesterol    • Hypertension    • Kidney disease, chronic, stage III (moderate, EGFR 30-59 ml/min) ([facility])    • Lumbar disc herniation    • Neuropathy    • Peripheral vascular disease ([facility])    • Pneumonia    • Shoulder injury     left   • Spinal stenosis    • Stroke ([address]) 2015    Memory loss     Past Surgical History:   Procedure Laterality Date   • CARDIAC CATHETERIZATION     • COLONOSCOPY     • FL RETROGRADE PYELOGRAM  4/6/2020   • FRACTURE SURGERY Right     ankle   • OVARIAN CYST SURGERY     • AZ CYSTO BLADDER W/URETERAL CATHETERIZATION Bilateral 4/6/2020    Procedure: CYSTOSCOPY WITH RETROGRADE PYELOGRAM;  Surgeon: Destiney Green MD;  [facility];  Service: Urology   • AZ CYSTO W/INSERT URETERAL STENT Right 4/6/2020    Procedure: INSERTION STENT URETERAL;  Surgeon: Destiney Green MD;  [facility];  Service: Urology   • AZ CYSTO W/REMOVAL OF TUMORS SMALL N/A 4/6/2020    Procedure: TRANSURETHRAL RESECTION OF BLADDER TUMOR (TURBT);   Surgeon: Destiney Green MD;  [facility];  Service: Urology   • ROTATOR CUFF REPAIR Left    • TONSILLECTOMY       Family History   Problem Relation Age of Onset   • Hypertension Mother    • Heart disease Mother         Valvular   • Hyperlipidemia Mother    • Hypertension Father    • Heart defect Father         Cardiomegaly   • Stroke Sister         Cerebrovascular Accident   • Arthritis Brother    • Other Brother         Back Disorder     Social History Socioeconomic History   • Marital status: /Civil Union     Spouse name: Not on file   • Number of children: Not on file   • Years of education: Not on file   • Highest education level: Not on file   Occupational History   • Occupation: retired   Tobacco Use   • Smoking status: Former     Packs/day: 2.00     Years: 40.00     Total pack years: 80.00     Types: Cigarettes     Start date: 1966     Quit date: 7/27/2020     Years since quitting: 3.0   • Smokeless tobacco: Never   Vaping Use   • Vaping Use: Never used   Substance and Sexual Activity   • Alcohol use: Never   • Drug use: Never   • Sexual activity: Not Currently     Partners: Male   Other Topics Concern   • Not on file   Social History Narrative    Occasional Caffeine Consumption     Social Determinants of Health     Financial Resource Strain: Not on file   Food Insecurity: No Food Insecurity (8/16/2023)    Hunger Vital Sign    • Worried About Running Out of Food in the Last Year: Never true    • Ran Out of Food in the Last Year: Never true   Transportation Needs: No Transportation Needs (8/16/2023)    PRAPARE - Transportation    • Lack of Transportation (Medical): No    • Lack of Transportation (Non-Medical): No   Physical Activity: Not on file   Stress: Not on file   Social Connections: Not on file   Intimate Partner Violence: Not on file   Housing Stability: Low Risk  (8/16/2023)    Housing Stability Vital Sign    • Unable to Pay for Housing in the Last Year: No    • Number of Places Lived in the Last Year: 1    • Unstable Housing in the Last Year: No     E-Cigarette/Vaping   • E-Cigarette Use Never User      E-Cigarette/Vaping Substances   • Nicotine No    • THC No    • CBD No    • Flavoring No    • Other No    • Unknown No          Medications:   All current active meds have been reviewed and current meds:  Scheduled Meds:  Current Facility-Administered Medications   Medication Dose Route Frequency Provider Last Rate   • allopurinol  100 mg Oral Daily Des Salazar MD     • amLODIPine  10 mg Oral Daily Des Salazar MD     • aspirin  81 mg Oral Daily Des Salazar MD     • cinacalcet  30 mg Oral Every Other Day Des Salazar MD     • cloNIDine  1 patch Transdermal Weekly Des Salazar MD     • fish oil  1,000 mg Oral TID Merrill Hylton MD     • heparin (porcine)  5,000 Units Subcutaneous [facility] Des Salazar MD     • hydrALAZINE  5 mg Intravenous Q6H PRN Des Salazar MD     • insulin lispro  1-5 Units Subcutaneous TID AC Des Salazar MD     • insulin lispro  1-5 Units Subcutaneous HS Des Salazar MD     • labetalol  300 mg Oral BID Des Salazar MD     • pravastatin  80 mg Oral Daily With Funmilayo Tariq MD     • ticagrelor  90 mg Oral Q12H [address] Des Salazar MD       Continuous Infusions:   PRN Meds:.•  hydrALAZINE       ROS:   Review of Systems    Vitals:   /78 (BP Location: Left arm)   Pulse 79   Temp 97.6 °F (36.4 °C) (Oral)   Resp 18   Ht 4' 10" (1.473 m)   Wt 72.6 kg (160 lb)   LMP  (LMP Unknown)   SpO2 94%   BMI 33.44 kg/m²     Physical Exam:   Physical Exam  Vitals reviewed. HENT:      Head: Normocephalic and atraumatic. Pulmonary:      Effort: Pulmonary effort is normal.   Skin:     General: Skin is warm and dry. Neurological:      Mental Status: She is alert. Motor: Motor strength is normal.     Coordination: Finger-Nose-Finger Test normal.       Neurologic Exam     Mental Status   Follows 2 step commands. Level of consciousness: alert  Able to repeat. speech is clear with ongoing expressive aphasia     Cranial Nerves   Right homonomous hemianopsia at baseline     Motor Exam   Right arm pronator drift: present    Strength   Strength 5/5 throughout. Slight RUE drift     Gait, Coordination, and Reflexes     Coordination   Finger to nose coordination: normal          Labs: I have personally reviewed pertinent reports.    Recent Results (from the past 24 hour(s))   Fingerstick Glucose (POCT)    Collection Time: 08/16/23  4:33 PM   Result Value Ref Range    POC Glucose 258 (H) 65 - 140 mg/dl   Fingerstick Glucose (POCT)    Collection Time: 08/16/23  8:29 PM   Result Value Ref Range    POC Glucose 138 65 - 140 mg/dl   CBC and differential    Collection Time: 08/17/23  5:31 AM   Result Value Ref Range    WBC 7.74 4.31 - 10.16 Thousand/uL    RBC 4.43 3.81 - 5.12 Million/uL    Hemoglobin 13.3 11.5 - 15.4 g/dL    Hematocrit 37.7 34.8 - 46.1 %    MCV 85 82 - 98 fL    MCH 30.0 26.8 - 34.3 pg    MCHC 35.3 31.4 - 37.4 g/dL    RDW 14.0 11.6 - 15.1 %    MPV 10.4 8.9 - 12.7 fL    Platelets 773 853 - 234 Thousands/uL    nRBC 0 /100 WBCs    Neutrophils Relative 65 43 - 75 %    Immat GRANS % 1 0 - 2 %    Lymphocytes Relative 19 14 - 44 %    Monocytes Relative 10 4 - 12 %    Eosinophils Relative 4 0 - 6 %    Basophils Relative 1 0 - 1 %    Neutrophils Absolute 5.13 1.85 - 7.62 Thousands/µL    Immature Grans Absolute 0.05 0.00 - 0.20 Thousand/uL    Lymphocytes Absolute 1.44 0.60 - 4.47 Thousands/µL    Monocytes Absolute 0.74 0.17 - 1.22 Thousand/µL    Eosinophils Absolute 0.32 0.00 - 0.61 Thousand/µL    Basophils Absolute 0.06 0.00 - 0.10 Thousands/µL   Basic metabolic panel    Collection Time: 08/17/23  5:31 AM   Result Value Ref Range    Sodium 138 135 - 147 mmol/L    Potassium 3.4 (L) 3.5 - 5.3 mmol/L    Chloride 108 96 - 108 mmol/L    CO2 20 (L) 21 - 32 mmol/L    ANION GAP 10 mmol/L    BUN 26 (H) 5 - 25 mg/dL    Creatinine 1.69 (H) 0.60 - 1.30 mg/dL    Glucose 187 (H) 65 - 140 mg/dL    Calcium 9.1 8.4 - 10.2 mg/dL    eGFR 29 ml/min/1.73sq m   Fingerstick Glucose (POCT)    Collection Time: 08/17/23  7:04 AM   Result Value Ref Range    POC Glucose 196 (H) 65 - 140 mg/dl   Fingerstick Glucose (POCT)    Collection Time: 08/17/23 11:18 AM   Result Value Ref Range    POC Glucose 184 (H) 65 - 140 mg/dl       Imaging: I have personally reviewed pertinent imaging in PACS and I have personally reviewed PACS reports. EKG, Pathology, and Other Studies: I have personally reviewed pertinent reports. Counseling / Coordination of Care  I have spent a total time of 28 minutes on 08/17/23 in caring for this patient including Diagnostic results, Instructions for management, Patient and family education, Impressions, Documenting in the medical record, Obtaining or reviewing history   and Communicating with other healthcare professionals . Assessment performed by Dr. Catracho Wilkins. Images and plan reviewed with DR. Matthews. Plan discussed with patient and primary service.  Case also discussed with cardiology

## 2023-08-17 NOTE — PLAN OF CARE
Problem: MOBILITY - ADULT  Goal: Maintain or return to baseline ADL function  Description: INTERVENTIONS:  -  Assess patient's ability to carry out ADLs; assess patient's baseline for ADL function and identify physical deficits which impact ability to perform ADLs (bathing, care of mouth/teeth, toileting, grooming, dressing, etc.)  - Assess/evaluate cause of self-care deficits   - Assess range of motion  - Assess patient's mobility; develop plan if impaired  - Assess patient's need for assistive devices and provide as appropriate  - Encourage maximum independence but intervene and supervise when necessary  - Involve family in performance of ADLs  - Assess for home care needs following discharge   - Consider OT consult to assist with ADL evaluation and planning for discharge  - Provide patient education as appropriate  Outcome: Progressing  Goal: Maintains/Returns to pre admission functional level  Description: INTERVENTIONS:  - Perform BMAT or MOVE assessment daily.   - Set and communicate daily mobility goal to care team and patient/family/caregiver. - Collaborate with rehabilitation services on mobility goals if consulted  - Perform Range of Motion  times a day. - Reposition patient every  hours.   - Dangle patient  times a day  - Stand patient  times a day  - Ambulate patient  times a day  - Out of bed to chair  times a day   - Out of bed for meals  times a day  - Out of bed for toileting  - Record patient progress and toleration of activity level   Outcome: Progressing     Problem: PAIN - ADULT  Goal: Verbalizes/displays adequate comfort level or baseline comfort level  Description: Interventions:  - Encourage patient to monitor pain and request assistance  - Assess pain using appropriate pain scale  - Administer analgesics based on type and severity of pain and evaluate response  - Implement non-pharmacological measures as appropriate and evaluate response  - Consider cultural and social influences on pain and pain management  - Notify physician/advanced practitioner if interventions unsuccessful or patient reports new pain  Outcome: Progressing     Problem: INFECTION - ADULT  Goal: Absence or prevention of progression during hospitalization  Description: INTERVENTIONS:  - Assess and monitor for signs and symptoms of infection  - Monitor lab/diagnostic results  - Monitor all insertion sites, i.e. indwelling lines, tubes, and drains  - Monitor endotracheal if appropriate and nasal secretions for changes in amount and color  - [place] appropriate cooling/warming therapies per order  - Administer medications as ordered  - Instruct and encourage patient and family to use good hand hygiene technique  - Identify and instruct in appropriate isolation precautions for identified infection/condition  Outcome: Progressing  Goal: Absence of fever/infection during neutropenic period  Description: INTERVENTIONS:  - Monitor WBC    Outcome: Progressing     Problem: SAFETY ADULT  Goal: Maintain or return to baseline ADL function  Description: INTERVENTIONS:  -  Assess patient's ability to carry out ADLs; assess patient's baseline for ADL function and identify physical deficits which impact ability to perform ADLs (bathing, care of mouth/teeth, toileting, grooming, dressing, etc.)  - Assess/evaluate cause of self-care deficits   - Assess range of motion  - Assess patient's mobility; develop plan if impaired  - Assess patient's need for assistive devices and provide as appropriate  - Encourage maximum independence but intervene and supervise when necessary  - Involve family in performance of ADLs  - Assess for home care needs following discharge   - Consider OT consult to assist with ADL evaluation and planning for discharge  - Provide patient education as appropriate  Outcome: Progressing  Goal: Maintains/Returns to pre admission functional level  Description: INTERVENTIONS:  - Perform BMAT or MOVE assessment daily.   - Set and communicate daily mobility goal to care team and patient/family/caregiver. - Collaborate with rehabilitation services on mobility goals if consulted  - Perform Range of Motion  times a day. - Reposition patient every hours.   - Dangle patient  times a day  - Stand patient  times a day  - Ambulate patient times a day  - Out of bed to chair  times a day   - Out of bed for meals  times a day  - Out of bed for toileting  - Record patient progress and toleration of activity level   Outcome: Progressing  Goal: Patient will remain free of falls  Description: INTERVENTIONS:  - Educate patient/family on patient safety including physical limitations  - Instruct patient to call for assistance with activity   - Consult OT/PT to assist with strengthening/mobility   - Keep Call bell within reach  - Keep bed low and locked with side rails adjusted as appropriate  - Keep care items and personal belongings within reach  - Initiate and maintain comfort rounds  - Make Fall Risk Sign visible to staff  - Offer Toileting every  Hours, in advance of need  - Initiate/Maintainalarm  - Obtain necessary fall risk management equipment:   - Apply yellow socks and bracelet for high fall risk patients  - Consider moving patient to room near nurses station  Outcome: Progressing     Problem: DISCHARGE PLANNING  Goal: Discharge to home or other facility with appropriate resources  Description: INTERVENTIONS:  - Identify barriers to discharge w/patient and caregiver  - Arrange for needed discharge resources and transportation as appropriate  - Identify discharge learning needs (meds, wound care, etc.)  - Arrange for interpretive services to assist at discharge as needed  - Refer to Case Management Department for coordinating discharge planning if the patient needs post-hospital services based on physician/advanced practitioner order or complex needs related to functional status, cognitive ability, or social support system  Outcome: Progressing Problem: Knowledge Deficit  Goal: Patient/family/caregiver demonstrates understanding of disease process, treatment plan, medications, and discharge instructions  Description: Complete learning assessment and assess knowledge base. Interventions:  - Provide teaching at level of understanding  - Provide teaching via preferred learning methods  Outcome: Progressing     Problem: Nutrition/Hydration-ADULT  Goal: Nutrient/Hydration intake appropriate for improving, restoring or maintaining nutritional needs  Description: Monitor and assess patient's nutrition/hydration status for malnutrition. Collaborate with interdisciplinary team and initiate plan and interventions as ordered. Monitor patient's weight and dietary intake as ordered or per policy. Utilize nutrition screening tool and intervene as necessary. Determine patient's food preferences and provide high-protein, high-caloric foods as appropriate.      INTERVENTIONS:  - Monitor oral intake, urinary output, labs, and treatment plans  - Assess nutrition and hydration status and recommend course of action  - Evaluate amount of meals eaten  - Assist patient with eating if necessary   - Allow adequate time for meals  - Recommend/ encourage appropriate diets, oral nutritional supplements, and vitamin/mineral supplements  - Order, calculate, and assess calorie counts as needed  - Recommend, monitor, and adjust tube feedings and TPN/PPN based on assessed needs  - Assess need for intravenous fluids  - Provide specific nutrition/hydration education as appropriate  - Include patient/family/caregiver in decisions related to nutrition  Outcome: Progressing

## 2023-08-17 NOTE — ASSESSMENT & PLAN NOTE
Ade Michelle is a 70 y.o. right handed female with DM,HTN, HLD, CKD3, PVD, basilar artery stenosis and recent left PCA/MCA territory stroke on asa/brilinta/statin who presented to the ED 8/15/23 with reported worsening aphasia. Initial /55, . [facility] without acute findings. She was admitted for further work-up with neurology consult    MRI showed new acute left occipital lobe infarcts. No new or worsening symptoms    Relevant home meds:  Asa 81mg daily started 23  Brilinta 90mg bid started 23  Pravastatin 80mg daily (Statins listed as allergy for patient (side effect of myalgia). Pt was agreeable to start pravastatin 80 mg daily on 23 with eventual consideration to switch to a PCSK9 inhibitor. Work-up:  Imagin/15/23 CTH: Evolving subacute left nonhemorrhagic posterior cerebral artery distribution infarction. 23 MRI brain:   1. Acute punctate infarcts in the left occipital lobe superimposed on the previously demonstrated subacute to early chronic infarct. 2. Punctate acute infarcts in the left MCA territory  Prior imagin23 carotid US: Impression  RIGHT:  There is 70+% stenosis noted in the internal carotid artery. Plaque is  heterogenous/homogenous and irregular/smooth. Vertebral artery flow is antegrade. There is no significant subclavian artery  disease. LEFT:  There is 70+% stenosis noted in the internal carotid artery. Plaque is  heterogenous/homogenous and irregular/smooth. Vertebral artery flow is antegrade. There is no significant subclavian artery  disease. Compared to previous study on 22 , there is an increase in disease in the  bilateral ICAs  23 MRI: 1. Large area of acute to subacute ischemia left PCA distribution occipital lobe. 2. Small foci of acute to subacute ischemia in the left frontal and left parietal/temporal lobes MCA distribution. 23 CTA head and neck:    1.   Hypodensity and loss of gray-white matter differentiation involving left occipital and inferior parietal lobe concerning for evolving acute or subacute infarct. 2.  Chronic microangiopathic change. 3.  High-grade stenosis of the left PCA at proximal P2 segment. Occluded versus hypoplastic left P1 segment with patent P-comm perfusing left PCA. 4.  Severe left and moderate right bilateral supraclinoid ICA stenosis. 5.  Severe stenosis of the left vertebral artery origin. Patent narrow caliber left vertebral artery in the cervical and pre-PICA intracranial segments with multifocal severe stenosis. Post PICA segment is occluded. 6.  Multifocal high-grade stenosis of the right intracranial vertebral artery. 7.  Advanced atherosclerotic change of bilateral cervical carotid arteries. Estimated 85-90% left and 80% right proximal ICA stenosis. 8.  Atherosclerotic disease of the aortic arch with linear defect at the origin of left subclavian artery. Differentials include ulcerated plaque versus focal dissection flap (likely chronic). Pertinent labs:  Lipid panel: Total cholesterol 200, triglycerides 378, LDL 79 (7/28/23 Total cholesterol 368, triglycerides 329, )  A1c 8.5 (7/27/23 A1c 9.1)    Recommendations:  New embolic appearing left occipital lobe infarcts  - Stroke pathway  • Echo with bubble, NM stress pending  • Continue home Aspirin 81 mg daily and brilinta 90mg bid   • Continue home Pravastatin 80 mg daily  • Normotension, avoid hypotension  • Euglycemic  • Continue telemetry; consider outpatient cardiac event monitor  • PT/OT/ST  • Stroke education  • Continue to monitor and notify neurology with any changes. • STAT CT head for any acute change in neuro exam  • Vascular surgery team following with plan for intervention with TCAR tentatively scheduled for 8/22/23 pending cardiac clearance. - Medical management and correction of any metabolic or infectious disturbances per primary service.    - Patient will need outpatient follow-up with neurovascular team.

## 2023-08-17 NOTE — ASSESSMENT & PLAN NOTE
· Continue aspirin and Brilinta for now  · Repeat MRI did reveal significant Acute punctate infarcts in the left occipital lobe superimposed on the previously demonstrated subacute to early chronic infarct.  /  Punctate acute infarcts in the left MCA territory. · Neurology on board - continue aspirin, ticagrelor, pravastatin, fish oil.  Follow TTE, will need loop recorder

## 2023-08-17 NOTE — ASSESSMENT & PLAN NOTE
· Patient with worsening dysarthria with recent large stroke. · Potentially just evolution of her previous stroke  · Recent CTA done shows, among other lesions, Advanced atherosclerotic change of bilateral cervical carotid arteries. Estimated 85-90% left and 80% right proximal ICA stenosis.    · Continue aspirin and Brilinta for now  · PT/OT/SLP -recommending rehab, case management on board  · Vascular surgery consult - may need TCAR, timing to be determined, will need cardiac clearance as per vascular so will consult cardiology - pending echo and stress test  · Monitor neurological exam

## 2023-08-17 NOTE — CONSULTS
Consultation - Cardiology   Branden Coates 70 y.o. female MRN: [id number]  Unit/Bed#: -01 Encounter: [id number]  08/17/23  1:26 PM    Assessment/ Plan:  1. Preoperative cardiac risk assessment to undergo TCAR. Given patient's history of CAD and multiple risk factors will arrange for echocardiogram and stress test to assess for ischemia. Patient's preoperative risk assessment to be determined after these test have been done. Continue medications. 2.  CVA, acute punctate infarct in left occipital lobe superimposed on previously demonstrated subacute to early chronic infarct, punctate acute infarcts in the left MCA territory as seen on MRI. Follow with stroke pathway. Management per [facility]/neurology. Continue aspirin, Norvasc, Catapres, pravastatin, Brilinta. 3.  Carotid artery stenosis, bilateral 70% stenosis. Per vascular surgery plan for TCAR. Continue Norvasc, aspirin, Catapres, pravastatin, Brilinta. 4. CAD (cardiac cath in 2020 showed 50 to 60% stenosis of mid LAD after origin of D2, 90% stenosis of proximal D2,  proximal RCA). Continue aspirin, Brilinta, pravastatin. Echocardiogram and stress test pending. 5.  Hypertension, elevated on admission now stable. 154/78. Continue Norvasc, Catapres    6. Hyperlipidemia on pravastatin    7. Hypokalemia, potassium 3.4. Continue with replacement. Continue to monitor. 8.  CKD, creatinine today 1.6 on admission 1.8. Continue to follow      History of Present Illness   Physician Requesting Consult: Janusz Muller,*    Reason for Consult / Principal Problem: preop tcar/stroke    HPI: Branden Coates is a 70y.o. year old female who presents with occulta getting words out. Patient states she had a stroke a few weeks ago and was sent home on Brilinta and aspirin. At that time she was offered to go to short-term rehab but she was adamant about going home. Patient states she was getting worse while at home.   She is having difficulty getting her words out and states her left side was good but the right side was no good. Also noted clumisness in the right extremity. Pt denies chest pain and shortness of breath. PMH: Hypertension, hyperlipidemia, CKD, strokes in the past, PVD, CAD (cardiac cath in 2020 showed 50 to 60% stenosis of mid LAD after origin of D2, 90% stenosis of proximal D2,  proximal RCA)    Consults    EKG: Sinus rhythm with first-degree AV block, LVH with repolarization abnormality, inferior infarct, anterior septal infarct, QRS shifted right      Review of Systems   Constitutional: Negative. Respiratory: Negative. Cardiovascular: Negative. Neurological: Positive for speech difficulty and weakness. Hematological: Negative. Psychiatric/Behavioral: Negative. All other systems reviewed and are negative. Historical Information   Past Medical History:   Diagnosis Date   • Arthritis    • Chronic kidney disease    • Endometriosis    • High cholesterol    • Hypertension    • Kidney disease, chronic, stage III (moderate, EGFR 30-59 ml/min) ([facility])    • Lumbar disc herniation    • Neuropathy    • Peripheral vascular disease ([facility])    • Pneumonia    • Shoulder injury     left   • Spinal stenosis    • Stroke ([address]) 2015    Memory loss     Past Surgical History:   Procedure Laterality Date   • CARDIAC CATHETERIZATION     • COLONOSCOPY     • FL RETROGRADE PYELOGRAM  4/6/2020   • FRACTURE SURGERY Right     ankle   • OVARIAN CYST SURGERY     • IN CYSTO BLADDER W/URETERAL CATHETERIZATION Bilateral 4/6/2020    Procedure: CYSTOSCOPY WITH RETROGRADE PYELOGRAM;  Surgeon: Le Campbell MD;  [facility];  Service: Urology   • IN CYSTO W/INSERT URETERAL STENT Right 4/6/2020    Procedure: INSERTION STENT URETERAL;  Surgeon: Le Campbell MD;  [facility];  Service: Urology   • IN CYSTO W/REMOVAL OF TUMORS SMALL N/A 4/6/2020    Procedure: TRANSURETHRAL RESECTION OF BLADDER TUMOR (TURBT);   Surgeon: Any Ellis MD;  [facility];  Service: Urology   • ROTATOR CUFF REPAIR Left    • TONSILLECTOMY       Social History     Substance and Sexual Activity   Alcohol Use Never     Social History     Substance and Sexual Activity   Drug Use Never     Social History     Tobacco Use   Smoking Status Former   • Packs/day: 2.00   • Years: 40.00   • Total pack years: 80.00   • Types: Cigarettes   • Start date: 1   • Quit date: 7/27/2020   • Years since quitting: 3.0   Smokeless Tobacco Never       Family History:   Family History   Problem Relation Age of Onset   • Hypertension Mother    • Heart disease Mother         Valvular   • Hyperlipidemia Mother    • Hypertension Father    • Heart defect Father         Cardiomegaly   • Stroke Sister         Cerebrovascular Accident   • Arthritis Brother    • Other Brother         Back Disorder       Meds/Allergies   all current active meds have been reviewed and current meds:   Current Facility-Administered Medications   Medication Dose Route Frequency   • allopurinol (ZYLOPRIM) tablet 100 mg  100 mg Oral Daily   • amLODIPine (NORVASC) tablet 10 mg  10 mg Oral Daily   • aspirin chewable tablet 81 mg  81 mg Oral Daily   • cinacalcet (SENSIPAR) tablet 30 mg  30 mg Oral Every Other Day   • cloNIDine (CATAPRES-TTS-3) 0.3 mg/24 hr TD weekly patch  1 patch Transdermal Weekly   • fish oil capsule 1,000 mg  1,000 mg Oral TID   • heparin (porcine) subcutaneous injection 5,000 Units  5,000 Units Subcutaneous Q8H [address]   • hydrALAZINE (APRESOLINE) injection 5 mg  5 mg Intravenous Q6H PRN   • insulin lispro (HumaLOG) 100 units/mL subcutaneous injection 1-5 Units  1-5 Units Subcutaneous TID AC   • insulin lispro (HumaLOG) 100 units/mL subcutaneous injection 1-5 Units  1-5 Units Subcutaneous HS   • labetalol (NORMODYNE) tablet 300 mg  300 mg Oral BID   • pravastatin (PRAVACHOL) tablet 80 mg  80 mg Oral Daily With Dinner   • ticagrelor (BRILINTA) tablet 90 mg  90 mg Oral Q12H [address] Allergies   Allergen Reactions   • Fenofibrate Other (See Comments)      blood in urine  hx  Kidney Failure   • Colesevelam Other (See Comments)      leg pains   • Colestipol Itching and Other (See Comments)      Swelling lower legs   • Ezetimibe GI Intolerance   • Statins Myalgia       Objective   Vitals: Blood pressure 154/78, pulse 79, temperature 97.6 °F (36.4 °C), temperature source Oral, resp. rate 18, height 4' 10" (1.473 m), weight 72.6 kg (160 lb), SpO2 94 %. , Body mass index is 33.44 kg/m².,   Orthostatic Blood Pressures    Flowsheet Row Most Recent Value   Blood Pressure 154/78 filed at 08/17/2023 1100   Patient Position - Orthostatic VS Sitting filed at 08/17/2023 6657          Systolic (69UFM), RYJ:729 , Min:135 , OES:716     Diastolic (41MMT), ONT:44, Min:53, Max:78      No intake or output data in the 24 hours ending 08/17/23 1326    Invasive Devices     Peripheral Intravenous Line  Duration           Peripheral IV 08/15/23 Left Antecubital 2 days                    Physical Exam:  GEN: Alert and oriented in no acute distress. Well appearing and well nourished. HEENT: Sclera anicteric, conjunctivae pink, mucous membranes moist. Oropharynx clear. NECK: Supple, no carotid bruits, no significant JVD. Trachea midline, no thyromegaly. HEART: Regular rhythm, normal S1 and S2, no murmurs, clicks, gallops or rubs. PMI nondisplaced, no thrills. LUNGS: Clear to auscultation bilaterally; no wheezes, rales, or rhonchi. No increased work of breathing or signs of respiratory distress. ABDOMEN: Soft, nontender, nondistended, normoactive bowel sounds. EXTREMITIES: Skin warm and well perfused, no clubbing, cyanosis, or edema. NEURO: Slurred speech. Mood and affect normal.   SKIN: Normal without suspicious lesions on exposed skin.       Lab Results:     Troponins:   Results from last 7 days   Lab Units 08/15/23  1526 08/15/23  1326 08/15/23  1126   HS TNI 0HR ng/L  --   --  5   HS TNI 2HR ng/L  --  5 --    HS TNI 4HR ng/L 4  --   --    HSTNI D4 ng/L -1  --   --        CBC with diff:   Results from last 7 days   Lab Units 08/17/23  0531 08/15/23  1557 08/15/23  1126   WBC Thousand/uL 7.74  --  8.98   HEMOGLOBIN g/dL 13.3  --  14.4   HEMATOCRIT % 37.7  --  40.8   MCV fL 85  --  88   PLATELETS Thousands/uL 243 242 286   RBC Million/uL 4.43  --  4.66   MCH pg 30.0  --  30.9   MCHC g/dL 35.3  --  35.3   RDW % 14.0  --  14.1   MPV fL 10.4 10.1 10.2   NRBC AUTO /100 WBCs 0  --  0         CMP:   Results from last 7 days   Lab Units 08/17/23  0531 08/15/23  1126   POTASSIUM mmol/L 3.4* 3.6   CHLORIDE mmol/L 108 106   CO2 mmol/L 20* 21   BUN mg/dL 26* 26*   CREATININE mg/dL 1.69* 1.89*   CALCIUM mg/dL 9.1 9.8   AST U/L  --  14   ALT U/L  --  15   ALK PHOS U/L  --  87   EGFR ml/min/1.73sq m 29 25

## 2023-08-17 NOTE — ASSESSMENT & PLAN NOTE
Lab Results   Component Value Date    EGFR 29 08/17/2023    EGFR 25 08/15/2023    EGFR 26 08/03/2023    CREATININE 1.69 (H) 08/17/2023    CREATININE 1.89 (H) 08/15/2023    CREATININE 1.80 (H) 08/03/2023   · Stable.     · The patient has been anywhere from 1.4-1.8  · Monitor and avoid nephrotoxins

## 2023-08-17 NOTE — PROGRESS NOTES
[address]  Progress Note  Name: Odette Castaneda  MRN: [id number]  Unit/Bed#: -01 I Date of Admission: 8/15/2023   Date of Service: 8/17/2023 I Hospital Day: 1    Assessment/Plan   * Dysarthria  Assessment & Plan  · Patient with worsening dysarthria with recent large stroke. · Potentially just evolution of her previous stroke  · Recent CTA done shows, among other lesions, Advanced atherosclerotic change of bilateral cervical carotid arteries. Estimated 85-90% left and 80% right proximal ICA stenosis. · Continue aspirin and Brilinta for now  · PT/OT/SLP -recommending rehab, case management on board  · Vascular surgery consult - may need TCAR, timing to be determined, will need cardiac clearance as per vascular so will consult cardiology - pending echo and stress test  · Monitor neurological exam      CVA (cerebral vascular accident) [facility])  Assessment & Plan  · Continue aspirin and Brilinta for now  · Repeat MRI did reveal significant Acute punctate infarcts in the left occipital lobe superimposed on the previously demonstrated subacute to early chronic infarct.  /  Punctate acute infarcts in the left MCA territory. · Neurology on board - continue aspirin, ticagrelor, pravastatin, fish oil. Follow TTE, will need loop recorder    Chronic kidney disease  Assessment & Plan  Lab Results   Component Value Date    EGFR 29 08/17/2023    EGFR 25 08/15/2023    EGFR 26 08/03/2023    CREATININE 1.69 (H) 08/17/2023    CREATININE 1.89 (H) 08/15/2023    CREATININE 1.80 (H) 08/03/2023   · Stable.     · The patient has been anywhere from 1.4-1.8  · Monitor and avoid nephrotoxins    Hypertension  Assessment & Plan  · Avoid hypotension    Claudication in peripheral vascular disease (HCC)  Assessment & Plan  · Continue home medications    Chronic GERD  Assessment & Plan  · Continue PPI    Basilar artery stenosis  Assessment & Plan  · Continue antithrombotics         VTE Pharmacologic Prophylaxis: Pharmacologic: Heparin  Mechanical VTE Prophylaxis in Place: Yes    Patient Centered Rounds: I have performed bedside rounds with nursing staff today. Discussions with Specialists or Other Care Team Provider: Discussed with care management    Education and Discussions with Family / Patient: Patient and son at the bedside    Time Spent for Care: 1 hour. More than 50% of total time spent on counseling and coordination of care as described above. Current Length of Stay: 1 day(s)    Current Patient Status: Inpatient   Certification Statement: The patient will continue to require additional inpatient hospital stay due to need for cardiac work-up    Discharge Plan: Once cardiac work-up completed    Code Status: Level 1 - Full Code      Subjective:     Patient evaluated this morning. She does still have some dysarthria. Denies any nausea or vomiting. Objective:     Vitals:   Temp (24hrs), Av.7 °F (36.5 °C), Min:97.4 °F (36.3 °C), Max:98.1 °F (36.7 °C)    Temp:  [97.4 °F (36.3 °C)-98.1 °F (36.7 °C)] 97.6 °F (36.4 °C)  HR:  [62-85] 79  Resp:  [18-19] 18  BP: (135-164)/(53-78) 154/78  SpO2:  [93 %-98 %] 94 %  Body mass index is 33.44 kg/m². Input and Output Summary (last 24 hours):     No intake or output data in the 24 hours ending 23 1438    Physical Exam:     Physical Exam  Vitals and nursing note reviewed. Constitutional:       Appearance: Normal appearance. She is normal weight. Comments: Female in bed, awake   HENT:      Head: Normocephalic and atraumatic. Right Ear: External ear normal.      Left Ear: External ear normal.      Nose: Nose normal. No congestion. Mouth/Throat:      Mouth: Mucous membranes are moist.      Pharynx: Oropharynx is clear. No oropharyngeal exudate or posterior oropharyngeal erythema. Eyes:      General: No scleral icterus. Right eye: No discharge. Left eye: No discharge. Extraocular Movements: Extraocular movements intact. Conjunctiva/sclera: Conjunctivae normal.      Pupils: Pupils are equal, round, and reactive to light. Cardiovascular:      Rate and Rhythm: Normal rate and regular rhythm. Pulses: Normal pulses. Heart sounds: Normal heart sounds. No murmur heard. No friction rub. No gallop. Pulmonary:      Effort: Pulmonary effort is normal. No respiratory distress. Breath sounds: Normal breath sounds. No stridor. No wheezing, rhonchi or rales. Chest:      Chest wall: No tenderness. Abdominal:      General: Abdomen is flat. Bowel sounds are normal. There is no distension. Palpations: Abdomen is soft. There is no mass. Tenderness: There is no abdominal tenderness. There is no guarding or rebound. Musculoskeletal:         General: No swelling, tenderness, deformity or signs of injury. Normal range of motion. Cervical back: Normal range of motion and neck supple. No rigidity. No muscular tenderness. Skin:     General: Skin is warm and dry. Capillary Refill: Capillary refill takes less than 2 seconds. Coloration: Skin is not jaundiced or pale. Findings: No bruising, erythema, lesion or rash. Neurological:      General: No focal deficit present. Mental Status: She is alert and oriented to person, place, and time. Mental status is at baseline. Cranial Nerves: No cranial nerve deficit. Sensory: No sensory deficit. Motor: No weakness. Coordination: Coordination normal.      Comments: Dysarthria noted on exam   Psychiatric:         Mood and Affect: Mood normal.         Behavior: Behavior normal.         Thought Content:  Thought content normal.         Judgment: Judgment normal.           Additional Data:     Labs:    Results from last 7 days   Lab Units 08/17/23  0531   WBC Thousand/uL 7.74   HEMOGLOBIN g/dL 13.3   HEMATOCRIT % 37.7   PLATELETS Thousands/uL 243   NEUTROS PCT % 65   LYMPHS PCT % 19   MONOS PCT % 10   EOS PCT % 4     Results from last 7 days   Lab Units 08/17/23  0531 08/15/23  1126   SODIUM mmol/L 138 138   POTASSIUM mmol/L 3.4* 3.6   CHLORIDE mmol/L 108 106   CO2 mmol/L 20* 21   BUN mg/dL 26* 26*   CREATININE mg/dL 1.69* 1.89*   ANION GAP mmol/L 10 11   CALCIUM mg/dL 9.1 9.8   ALBUMIN g/dL  --  4.3   TOTAL BILIRUBIN mg/dL  --  0.71   ALK PHOS U/L  --  87   ALT U/L  --  15   AST U/L  --  14   GLUCOSE RANDOM mg/dL 187* 267*         Results from last 7 days   Lab Units 08/17/23  1118 08/17/23  0704 08/16/23  2029 08/16/23  1633 08/16/23  1058 08/16/23  0712 08/15/23  2042 08/15/23  1657 08/15/23  1102   POC GLUCOSE mg/dl 184* 196* 138 258* 205* 184* 138 230* 263*     Results from last 7 days   Lab Units 08/16/23  0544   HEMOGLOBIN A1C % 8.5*               * I Have Reviewed All Lab Data Listed Above. * Additional Pertinent Lab Tests Reviewed:  [address] Admission Reviewed      Recent Cultures (last 7 days):           Last 24 Hours Medication List:   Current Facility-Administered Medications   Medication Dose Route Frequency Provider Last Rate   • allopurinol  100 mg Oral Daily Des Edgar MD     • amLODIPine  10 mg Oral Daily Des Edgar MD     • aspirin  81 mg Oral Daily Des Edgar MD     • cinacalcet  30 mg Oral Every Other Day Des Edgar MD     • cloNIDine  1 patch Transdermal Weekly Des Edgar MD     • fish oil  1,000 mg Oral TID Darya Laguerre MD     • heparin (porcine)  5,000 Units Subcutaneous [facility] Des Edgar MD     • hydrALAZINE  5 mg Intravenous Q6H PRN Des Edgar MD     • insulin lispro  1-5 Units Subcutaneous TID AC Des Edgar MD     • insulin lispro  1-5 Units Subcutaneous HS Des dEgar MD     • labetalol  300 mg Oral BID Des Edgar MD     • pravastatin  80 mg Oral Daily With Christiano Santiago MD     • ticagrelor  90 mg Oral Q12H [facility] Des Edgar MD          Today, Patient Was Seen By: Darya Laguerre, MD    ** Please Note: Dictation voice to text software may have been used in the creation of this document.  **

## 2023-08-17 NOTE — CASE MANAGEMENT
Case Management Discharge Planning Note    Patient name Louise Dorsey  Location [address] [facility] [place] 234/-41 MRN [id number]  : 1945 Date 2023       Current Admission Date: 8/15/2023  Current Admission 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   • Diabetes ([address]) 2021   • Type 2 diabetes mellitus with diabetic peripheral angiopathy without gangrene ([address]) 2021   • Embolism and 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 ([address]) 2021   • Depression, recurrent ([address]) 2021   • Obesity, morbid ([address]) 2021   • Stage 4 chronic kidney disease ([address]) 2020   • Hypertensive kidney disease with stage 4 chronic kidney disease ([address]) 2020   • Cervical radiculopathy 2020   • Malignant neoplasm of overlapping sites of bladder ([address]) 2020   • Stenosis of left anterior descending artery Mid LAD 50-60% stenosis 2020   • 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  2020   • Mesenteric artery stenosis (HCC) 2020   • Positive cardiac stress test  small, mildly severe, partially reversible myocardial perfusion defect of anterior and inferior wall  11/12/2019   • Abnormal CT of the chest suspicious for an infectious or inflammatory bronchiolitis. 11/07/2019   • Celiac artery stenosis (HCC) >70% stenosis in the celiac trunk 11/05/2019   • Median arcuate ligament syndrome ([address]) 11/05/2019   • Atherosclerosis of renal artery ([address]) 07/01/2019   • Aortoiliac stenosis, left (HCC) 02/25/2019   • Spondylosis of lumbar region without myelopathy or radiculopathy 01/29/2019   • Lumbosacral spondylosis without myelopathy 01/29/2019   • Statin intolerance 01/22/2019   • Lumbar disc herniation 01/22/2019   • Herniated lumbar intervertebral disc 01/22/2019   • Chronic GERD 12/04/2017   • Claudication in peripheral vascular disease ([address]) 12/04/2017   • Gout 12/04/2017   • Hx-TIA (transient ischemic attack) 12/04/2017   • Hyperlipidemia associated with type 2 diabetes mellitus ([address]) 12/04/2017   • Hypertension associated with diabetes ([address]) 12/04/2017   • Osteoarthritis 12/04/2017   • Obesity (BMI 30-39.9) 12/04/2017   • Right renal artery stenosis ([address]) 12/04/2017   • 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   • Hyperlipidemia, unspecified 12/04/2017   • Vitamin D deficiency 09/08/2016   • Basilar artery stenosis 06/22/2016   • Type 2 diabetes mellitus with diabetic nephropathy ([address]) 12/19/2015   • Hypercholesteremia 12/19/2015   • Hypertension 12/19/2015   • Polyneuropathy 12/19/2015   • CVA (cerebral vascular accident) ([address]) 11/09/2015      LOS (days): 1  Geometric Mean LOS (GMLOS) (days): 2.90  Days to GMLOS:1.8     OBJECTIVE:  Risk of Unplanned Readmission Score: 18.77         Current admission status: Inpatient   Preferred Pharmacy:   [address] [place] [address]  [address] [place] [address]  Phone: [phone] Fax: [phone]    [id number] [place] - [address] [facility] [place] [address]  Phone: [phone] Fax: [phone]    Primary Care Provider: Tiara Frazier MD    Primary Insurance: Josse KILLIAN  Secondary Insurance:     DISCHARGE DETAILS:                                          Other Referral/Resources/Interventions Provided:  Referral Comments: Response from Indra Aguirre as follows:  "Keli Garrettraheem at [facility]  If we have an available bed, we could accept over the weekend. NPI for Pk Mueller is [id number]. Our MD is Dr. Lilia Dyer, NPI is [id number]. Address for  is [address] [place] [address], [address] [place] [address]. Phone for the office is [phone], fax is [phone]. Thanks, Geri Orona"  Request to initiate auth made via AIDIN to  D/C Support Team;  receipt confirmed.

## 2023-08-17 NOTE — CASE MANAGEMENT
[facility] received request for authorization from Care Manager.   Authorization request for [address] Name: Rosa Isela Jimenez   NNV:[id number]  Facility MD: Dr. Barrera Crooks    NPI: [id number]  Authorization initiated by contacting insurance: Roxana Hung  Via: Mary Travis  Pending Reference #:[id number]  Clinicals submitted via: Mary Travis

## 2023-08-17 NOTE — PLAN OF CARE
Problem: MOBILITY - ADULT  Goal: Maintain or return to baseline ADL function  Description: INTERVENTIONS:  -  Assess patient's ability to carry out ADLs; assess patient's baseline for ADL function and identify physical deficits which impact ability to perform ADLs (bathing, care of mouth/teeth, toileting, grooming, dressing, etc.)  - Assess/evaluate cause of self-care deficits   - Assess range of motion  - Assess patient's mobility; develop plan if impaired  - Assess patient's need for assistive devices and provide as appropriate  - Encourage maximum independence but intervene and supervise when necessary  - Involve family in performance of ADLs  - Assess for home care needs following discharge   - Consider OT consult to assist with ADL evaluation and planning for discharge  - Provide patient education as appropriate  Outcome: Progressing  Goal: Maintains/Returns to pre admission functional level  Description: INTERVENTIONS:  - Perform BMAT or MOVE assessment daily.   - Set and communicate daily mobility goal to care team and patient/family/caregiver. - Collaborate with rehabilitation services on mobility goals if consulted  - Perform Range of Motion    times a day. - Reposition patient every    hours.   - Dangle patient      times a day  - Stand patient    times a day  - Ambulate patient    times a day  - Out of bed to chair    times a day   - Out of bed for meals    times a day  - Out of bed for toileting  - Record patient progress and toleration of activity level   Outcome: Progressing     Problem: PAIN - ADULT  Goal: Verbalizes/displays adequate comfort level or baseline comfort level  Description: Interventions:  - Encourage patient to monitor pain and request assistance  - Assess pain using appropriate pain scale  - Administer analgesics based on type and severity of pain and evaluate response  - Implement non-pharmacological measures as appropriate and evaluate response  - Consider cultural and social influences on pain and pain management  - Notify physician/advanced practitioner if interventions unsuccessful or patient reports new pain  Outcome: Progressing     Problem: INFECTION - ADULT  Goal: Absence or prevention of progression during hospitalization  Description: INTERVENTIONS:  - Assess and monitor for signs and symptoms of infection  - Monitor lab/diagnostic results  - Monitor all insertion sites, i.e. indwelling lines, tubes, and drains  - Monitor endotracheal if appropriate and nasal secretions for changes in amount and color  - [place] appropriate cooling/warming therapies per order  - Administer medications as ordered  - Instruct and encourage patient and family to use good hand hygiene technique  - Identify and instruct in appropriate isolation precautions for identified infection/condition  Outcome: Progressing  Goal: Absence of fever/infection during neutropenic period  Description: INTERVENTIONS:  - Monitor WBC    Outcome: Progressing     Problem: SAFETY ADULT  Goal: Maintain or return to baseline ADL function  Description: INTERVENTIONS:  -  Assess patient's ability to carry out ADLs; assess patient's baseline for ADL function and identify physical deficits which impact ability to perform ADLs (bathing, care of mouth/teeth, toileting, grooming, dressing, etc.)  - Assess/evaluate cause of self-care deficits   - Assess range of motion  - Assess patient's mobility; develop plan if impaired  - Assess patient's need for assistive devices and provide as appropriate  - Encourage maximum independence but intervene and supervise when necessary  - Involve family in performance of ADLs  - Assess for home care needs following discharge   - Consider OT consult to assist with ADL evaluation and planning for discharge  - Provide patient education as appropriate  Outcome: Progressing  Goal: Maintains/Returns to pre admission functional level  Description: INTERVENTIONS:  - Perform BMAT or MOVE assessment daily.   - Set and communicate daily mobility goal to care team and patient/family/caregiver. - Collaborate with rehabilitation services on mobility goals if consulted  - Perform Range of Motion      times a day. - Reposition patient every    hours.   - Dangle patient    times a day  - Stand patient    times a day  - Ambulate patient    times a day  - Out of bed to chair      times a day   - Out of bed for meals    times a day  - Out of bed for toileting  - Record patient progress and toleration of activity level   Outcome: Progressing  Goal: Patient will remain free of falls  Description: INTERVENTIONS:  - Educate patient/family on patient safety including physical limitations  - Instruct patient to call for assistance with activity   - Consult OT/PT to assist with strengthening/mobility   - Keep Call bell within reach  - Keep bed low and locked with side rails adjusted as appropriate  - Keep care items and personal belongings within reach  - Initiate and maintain comfort rounds  - Make Fall Risk Sign visible to staff  - Offer Toileting every    Hours, in advance of need  - Initiate/Maintain   alarm  - Obtain necessary fall risk management equipment:  - Apply yellow socks and bracelet for high fall risk patients  - Consider moving patient to room near nurses station  Outcome: Progressing     Problem: DISCHARGE PLANNING  Goal: Discharge to home or other facility with appropriate resources  Description: INTERVENTIONS:  - Identify barriers to discharge w/patient and caregiver  - Arrange for needed discharge resources and transportation as appropriate  - Identify discharge learning needs (meds, wound care, etc.)  - Arrange for interpretive services to assist at discharge as needed  - Refer to Case Management Department for coordinating discharge planning if the patient needs post-hospital services based on physician/advanced practitioner order or complex needs related to functional status, cognitive ability, or social support system  Outcome: Progressing     Problem: Knowledge Deficit  Goal: Patient/family/caregiver demonstrates understanding of disease process, treatment plan, medications, and discharge instructions  Description: Complete learning assessment and assess knowledge base. Interventions:  - Provide teaching at level of understanding  - Provide teaching via preferred learning methods  Outcome: Progressing     Problem: Nutrition/Hydration-ADULT  Goal: Nutrient/Hydration intake appropriate for improving, restoring or maintaining nutritional needs  Description: Monitor and assess patient's nutrition/hydration status for malnutrition. Collaborate with interdisciplinary team and initiate plan and interventions as ordered. Monitor patient's weight and dietary intake as ordered or per policy. Utilize nutrition screening tool and intervene as necessary. Determine patient's food preferences and provide high-protein, high-caloric foods as appropriate.      INTERVENTIONS:  - Monitor oral intake, urinary output, labs, and treatment plans  - Assess nutrition and hydration status and recommend course of action  - Evaluate amount of meals eaten  - Assist patient with eating if necessary   - Allow adequate time for meals  - Recommend/ encourage appropriate diets, oral nutritional supplements, and vitamin/mineral supplements  - Order, calculate, and assess calorie counts as needed  - Recommend, monitor, and adjust tube feedings and TPN/PPN based on assessed needs  - Assess need for intravenous fluids  - Provide specific nutrition/hydration education as appropriate  - Include patient/family/caregiver in decisions related to nutrition  Outcome: Progressing

## 2023-08-18 ENCOUNTER — APPOINTMENT (INPATIENT)
Dept: NUCLEAR MEDICINE | Facility: HOSPITAL | Age: 78
DRG: 065 | End: 2023-08-18
Payer: COMMERCIAL

## 2023-08-18 ENCOUNTER — APPOINTMENT (INPATIENT)
Dept: NON INVASIVE DIAGNOSTICS | Facility: HOSPITAL | Age: 78
DRG: 065 | End: 2023-08-18
Payer: COMMERCIAL

## 2023-08-18 ENCOUNTER — TELEPHONE (OUTPATIENT)
Dept: CARDIOLOGY CLINIC | Facility: CLINIC | Age: 78
End: 2023-08-18

## 2023-08-18 LAB
ANION GAP SERPL CALCULATED.3IONS-SCNC: 10 MMOL/L
AORTIC ROOT: 3 CM
APICAL FOUR CHAMBER EJECTION FRACTION: 62 %
ASCENDING AORTA: 3.4 CM
BASOPHILS # BLD AUTO: 0.06 THOUSANDS/ÂΜL (ref 0–0.1)
BASOPHILS NFR BLD AUTO: 1 % (ref 0–1)
BUN SERPL-MCNC: 23 MG/DL (ref 5–25)
CALCIUM SERPL-MCNC: 9.1 MG/DL (ref 8.4–10.2)
CHEST PAIN STATEMENT: NORMAL
CHLORIDE SERPL-SCNC: 108 MMOL/L (ref 96–108)
CO2 SERPL-SCNC: 19 MMOL/L (ref 21–32)
CREAT SERPL-MCNC: 1.6 MG/DL (ref 0.6–1.3)
E WAVE DECELERATION TIME: 211 MS
EOSINOPHIL # BLD AUTO: 0.35 THOUSAND/ÂΜL (ref 0–0.61)
EOSINOPHIL NFR BLD AUTO: 5 % (ref 0–6)
ERYTHROCYTE [DISTWIDTH] IN BLOOD BY AUTOMATED COUNT: 14 % (ref 11.6–15.1)
FRACTIONAL SHORTENING: 39 % (ref 28–44)
GFR SERPL CREATININE-BSD FRML MDRD: 31 ML/MIN/1.73SQ M
GLUCOSE SERPL-MCNC: 137 MG/DL (ref 65–140)
GLUCOSE SERPL-MCNC: 138 MG/DL (ref 65–140)
GLUCOSE SERPL-MCNC: 163 MG/DL (ref 65–140)
GLUCOSE SERPL-MCNC: 171 MG/DL (ref 65–140)
GLUCOSE SERPL-MCNC: 260 MG/DL (ref 65–140)
HCT VFR BLD AUTO: 37.5 % (ref 34.8–46.1)
HGB BLD-MCNC: 13.2 G/DL (ref 11.5–15.4)
IMM GRANULOCYTES # BLD AUTO: 0.06 THOUSAND/UL (ref 0–0.2)
IMM GRANULOCYTES NFR BLD AUTO: 1 % (ref 0–2)
INTERVENTRICULAR SEPTUM IN DIASTOLE (PARASTERNAL SHORT AXIS VIEW): 1.3 CM
INTERVENTRICULAR SEPTUM: 1.3 CM (ref 0.6–1.1)
LA/AORTA RATIO 2D: 1.07
LAAS-AP2: 14.9 CM2
LAAS-AP4: 17.5 CM2
LEFT ATRIUM SIZE: 3.2 CM
LEFT ATRIUM VOLUME (MOD BIPLANE): 44 ML
LEFT INTERNAL DIMENSION IN SYSTOLE: 2.2 CM (ref 2.1–4)
LEFT VENTRICULAR INTERNAL DIMENSION IN DIASTOLE: 3.6 CM (ref 3.5–6)
LEFT VENTRICULAR POSTERIOR WALL IN END DIASTOLE: 1.3 CM
LEFT VENTRICULAR STROKE VOLUME: 38 ML
LVSV (TEICH): 38 ML
LYMPHOCYTES # BLD AUTO: 1.8 THOUSANDS/ÂΜL (ref 0.6–4.47)
LYMPHOCYTES NFR BLD AUTO: 23 % (ref 14–44)
MAX DIASTOLIC BP: 79 MMHG
MAX DIASTOLIC BP: 83 MMHG
MAX HEART RATE: 75 BPM
MAX HEART RATE: 97 BPM
MAX PREDICTED HEART RATE: 144 BPM
MAX PREDICTED HEART RATE: 144 BPM
MAX. SYSTOLIC BP: 174 MMHG
MAX. SYSTOLIC BP: 194 MMHG
MCH RBC QN AUTO: 30.3 PG (ref 26.8–34.3)
MCHC RBC AUTO-ENTMCNC: 35.2 G/DL (ref 31.4–37.4)
MCV RBC AUTO: 86 FL (ref 82–98)
MONOCYTES # BLD AUTO: 0.74 THOUSAND/ÂΜL (ref 0.17–1.22)
MONOCYTES NFR BLD AUTO: 10 % (ref 4–12)
MV E'TISSUE VEL-SEP: 6 CM/S
MV PEAK A VEL: 1.46 M/S
MV PEAK E VEL: 75 CM/S
MV STENOSIS PRESSURE HALF TIME: 62 MS
MV VALVE AREA P 1/2 METHOD: 3.55 CM2
NEUTROPHILS # BLD AUTO: 4.71 THOUSANDS/ÂΜL (ref 1.85–7.62)
NEUTS SEG NFR BLD AUTO: 60 % (ref 43–75)
NRBC BLD AUTO-RTO: 0 /100 WBCS
NUC STRESS EJECTION FRACTION: 58 %
PLATELET # BLD AUTO: 246 THOUSANDS/UL (ref 149–390)
PMV BLD AUTO: 10.4 FL (ref 8.9–12.7)
POTASSIUM SERPL-SCNC: 3.6 MMOL/L (ref 3.5–5.3)
PROTOCOL NAME: NORMAL
PROTOCOL NAME: NORMAL
RATE PRESSURE PRODUCT: NORMAL
RBC # BLD AUTO: 4.36 MILLION/UL (ref 3.81–5.12)
REASON FOR TERMINATION: NORMAL
SL CV PED ECHO LEFT VENTRICLE DIASTOLIC VOLUME (MOD BIPLANE) 2D: 54 ML
SL CV PED ECHO LEFT VENTRICLE SYSTOLIC VOLUME (MOD BIPLANE) 2D: 16 ML
SL CV REST NUCLEAR ISOTOPE DOSE: 11 MCI
SL CV STRESS NUCLEAR ISOTOPE DOSE: 28.2 MCI
SL CV STRESS RECOVERY BP: NORMAL MMHG
SL CV STRESS RECOVERY HR: 81 BPM
SL CV STRESS RECOVERY O2 SAT: 100 %
SODIUM SERPL-SCNC: 137 MMOL/L (ref 135–147)
STRESS ANGINA INDEX: 1
STRESS BASELINE BP: NORMAL MMHG
STRESS BASELINE HR: 75 BPM
STRESS O2 SAT REST: 99 %
STRESS PEAK HR: 97 BPM
STRESS POST O2 SAT PEAK: 100 %
STRESS POST PEAK BP: 194 MMHG
TARGET HR FORMULA: NORMAL
TARGET HR FORMULA: NORMAL
TEST INDICATION: NORMAL
TEST INDICATION: NORMAL
TIME IN EXERCISE PHASE: NORMAL
TIME IN EXERCISE PHASE: NORMAL
TRICUSPID ANNULAR PLANE SYSTOLIC EXCURSION: 2.4 CM
WBC # BLD AUTO: 7.72 THOUSAND/UL (ref 4.31–10.16)

## 2023-08-18 PROCEDURE — 82948 REAGENT STRIP/BLOOD GLUCOSE: CPT

## 2023-08-18 PROCEDURE — 93306 TTE W/DOPPLER COMPLETE: CPT | Performed by: INTERNAL MEDICINE

## 2023-08-18 PROCEDURE — 99232 SBSQ HOSP IP/OBS MODERATE 35: CPT | Performed by: PSYCHIATRY & NEUROLOGY

## 2023-08-18 PROCEDURE — 99233 SBSQ HOSP IP/OBS HIGH 50: CPT | Performed by: INTERNAL MEDICINE

## 2023-08-18 PROCEDURE — 93018 CV STRESS TEST I&R ONLY: CPT | Performed by: INTERNAL MEDICINE

## 2023-08-18 PROCEDURE — 85025 COMPLETE CBC W/AUTO DIFF WBC: CPT | Performed by: INTERNAL MEDICINE

## 2023-08-18 PROCEDURE — 99232 SBSQ HOSP IP/OBS MODERATE 35: CPT | Performed by: INTERNAL MEDICINE

## 2023-08-18 PROCEDURE — G1004 CDSM NDSC: HCPCS

## 2023-08-18 PROCEDURE — 97116 GAIT TRAINING THERAPY: CPT

## 2023-08-18 PROCEDURE — 93016 CV STRESS TEST SUPVJ ONLY: CPT | Performed by: INTERNAL MEDICINE

## 2023-08-18 PROCEDURE — A9502 TC99M TETROFOSMIN: HCPCS

## 2023-08-18 PROCEDURE — 99233 SBSQ HOSP IP/OBS HIGH 50: CPT | Performed by: NURSE PRACTITIONER

## 2023-08-18 PROCEDURE — 80048 BASIC METABOLIC PNL TOTAL CA: CPT | Performed by: INTERNAL MEDICINE

## 2023-08-18 PROCEDURE — 93017 CV STRESS TEST TRACING ONLY: CPT

## 2023-08-18 PROCEDURE — 92507 TX SP LANG VOICE COMM INDIV: CPT

## 2023-08-18 PROCEDURE — 78452 HT MUSCLE IMAGE SPECT MULT: CPT

## 2023-08-18 PROCEDURE — 97530 THERAPEUTIC ACTIVITIES: CPT

## 2023-08-18 PROCEDURE — 93306 TTE W/DOPPLER COMPLETE: CPT

## 2023-08-18 PROCEDURE — 78452 HT MUSCLE IMAGE SPECT MULT: CPT | Performed by: INTERNAL MEDICINE

## 2023-08-18 RX ORDER — REGADENOSON 0.08 MG/ML
0.4 INJECTION, SOLUTION INTRAVENOUS ONCE
Status: COMPLETED | OUTPATIENT
Start: 2023-08-18 | End: 2023-08-18

## 2023-08-18 RX ADMIN — REGADENOSON 0.4 MG: 0.08 INJECTION, SOLUTION INTRAVENOUS at 08:56

## 2023-08-18 RX ADMIN — OMEGA-3 FATTY ACIDS CAP 1000 MG 1000 MG: 1000 CAP at 09:00

## 2023-08-18 RX ADMIN — HEPARIN SODIUM 5000 UNITS: 5000 INJECTION INTRAVENOUS; SUBCUTANEOUS at 14:22

## 2023-08-18 RX ADMIN — ASPIRIN 81 MG: 81 TABLET, CHEWABLE ORAL at 09:00

## 2023-08-18 RX ADMIN — INSULIN LISPRO 2 UNITS: 100 INJECTION, SOLUTION INTRAVENOUS; SUBCUTANEOUS at 12:00

## 2023-08-18 RX ADMIN — LABETALOL HYDROCHLORIDE 300 MG: 100 TABLET, FILM COATED ORAL at 18:05

## 2023-08-18 RX ADMIN — INSULIN LISPRO 1 UNITS: 100 INJECTION, SOLUTION INTRAVENOUS; SUBCUTANEOUS at 08:00

## 2023-08-18 RX ADMIN — ALLOPURINOL 100 MG: 100 TABLET ORAL at 09:00

## 2023-08-18 RX ADMIN — TICAGRELOR 90 MG: 90 TABLET ORAL at 09:00

## 2023-08-18 RX ADMIN — HEPARIN SODIUM 5000 UNITS: 5000 INJECTION INTRAVENOUS; SUBCUTANEOUS at 21:31

## 2023-08-18 RX ADMIN — AMLODIPINE BESYLATE 10 MG: 10 TABLET ORAL at 09:00

## 2023-08-18 RX ADMIN — PRAVASTATIN SODIUM 80 MG: 80 TABLET ORAL at 16:49

## 2023-08-18 RX ADMIN — HEPARIN SODIUM 5000 UNITS: 5000 INJECTION INTRAVENOUS; SUBCUTANEOUS at 05:33

## 2023-08-18 RX ADMIN — LABETALOL HYDROCHLORIDE 300 MG: 100 TABLET, FILM COATED ORAL at 09:00

## 2023-08-18 RX ADMIN — TICAGRELOR 90 MG: 90 TABLET ORAL at 21:31

## 2023-08-18 RX ADMIN — OMEGA-3 FATTY ACIDS CAP 1000 MG 1000 MG: 1000 CAP at 21:31

## 2023-08-18 RX ADMIN — OMEGA-3 FATTY ACIDS CAP 1000 MG 1000 MG: 1000 CAP at 16:49

## 2023-08-18 NOTE — PLAN OF CARE
Problem: MOBILITY - ADULT  Goal: Maintain or return to baseline ADL function  Description: INTERVENTIONS:  -  Assess patient's ability to carry out ADLs; assess patient's baseline for ADL function and identify physical deficits which impact ability to perform ADLs (bathing, care of mouth/teeth, toileting, grooming, dressing, etc.)  - Assess/evaluate cause of self-care deficits   - Assess range of motion  - Assess patient's mobility; develop plan if impaired  - Assess patient's need for assistive devices and provide as appropriate  - Encourage maximum independence but intervene and supervise when necessary  - Involve family in performance of ADLs  - Assess for home care needs following discharge   - Consider OT consult to assist with ADL evaluation and planning for discharge  - Provide patient education as appropriate  Outcome: Progressing  Goal: Maintains/Returns to pre admission functional level  Description: INTERVENTIONS:  - Perform BMAT or MOVE assessment daily.   - Set and communicate daily mobility goal to care team and patient/family/caregiver. - Collaborate with rehabilitation services on mobility goals if consulted  - Perform Range of Motion 3 times a day. - Reposition patient every 2 hours.   - Dangle patient 3 times a day  - Stand patient 3 times a day  - Ambulate patient 3 times a day  - Out of bed to chair 3 times a day   - Out of bed for meals 3 times a day  - Out of bed for toileting  - Record patient progress and toleration of activity level   Outcome: Progressing     Problem: PAIN - ADULT  Goal: Verbalizes/displays adequate comfort level or baseline comfort level  Description: Interventions:  - Encourage patient to monitor pain and request assistance  - Assess pain using appropriate pain scale  - Administer analgesics based on type and severity of pain and evaluate response  - Implement non-pharmacological measures as appropriate and evaluate response  - Consider cultural and social influences on pain and pain management  - Notify physician/advanced practitioner if interventions unsuccessful or patient reports new pain  Outcome: Progressing     Problem: INFECTION - ADULT  Goal: Absence or prevention of progression during hospitalization  Description: INTERVENTIONS:  - Assess and monitor for signs and symptoms of infection  - Monitor lab/diagnostic results  - Monitor all insertion sites, i.e. indwelling lines, tubes, and drains  - Monitor endotracheal if appropriate and nasal secretions for changes in amount and color  - Linch appropriate cooling/warming therapies per order  - Administer medications as ordered  - Instruct and encourage patient and family to use good hand hygiene technique  - Identify and instruct in appropriate isolation precautions for identified infection/condition  Outcome: Progressing  Goal: Absence of fever/infection during neutropenic period  Description: INTERVENTIONS:  - Monitor WBC    Outcome: Progressing     Problem: SAFETY ADULT  Goal: Maintain or return to baseline ADL function  Description: INTERVENTIONS:  -  Assess patient's ability to carry out ADLs; assess patient's baseline for ADL function and identify physical deficits which impact ability to perform ADLs (bathing, care of mouth/teeth, toileting, grooming, dressing, etc.)  - Assess/evaluate cause of self-care deficits   - Assess range of motion  - Assess patient's mobility; develop plan if impaired  - Assess patient's need for assistive devices and provide as appropriate  - Encourage maximum independence but intervene and supervise when necessary  - Involve family in performance of ADLs  - Assess for home care needs following discharge   - Consider OT consult to assist with ADL evaluation and planning for discharge  - Provide patient education as appropriate  Outcome: Progressing  Goal: Maintains/Returns to pre admission functional level  Description: INTERVENTIONS:  - Perform BMAT or MOVE assessment daily.   - Set and communicate daily mobility goal to care team and patient/family/caregiver. - Collaborate with rehabilitation services on mobility goals if consulted  - Perform Range of Motion 3 times a day. - Reposition patient every 2 hours.   - Dangle patient 3 times a day  - Stand patient 3 times a day  - Ambulate patient 3 times a day  - Out of bed to chair 3 times a day   - Out of bed for meals 3 times a day  - Out of bed for toileting  - Record patient progress and toleration of activity level   Outcome: Progressing  Goal: Patient will remain free of falls  Description: INTERVENTIONS:  - Educate patient/family on patient safety including physical limitations  - Instruct patient to call for assistance with activity   - Consult OT/PT to assist with strengthening/mobility   - Keep Call bell within reach  - Keep bed low and locked with side rails adjusted as appropriate  - Keep care items and personal belongings within reach  - Initiate and maintain comfort rounds  - Make Fall Risk Sign visible to staff  - Offer Toileting every 2 Hours, in advance of need  - Initiate/Maintain bed alarm  - Obtain necessary fall risk management equipment  - Apply yellow socks and bracelet for high fall risk patients  - Consider moving patient to room near nurses station  Outcome: Progressing     Problem: DISCHARGE PLANNING  Goal: Discharge to home or other facility with appropriate resources  Description: INTERVENTIONS:  - Identify barriers to discharge w/patient and caregiver  - Arrange for needed discharge resources and transportation as appropriate  - Identify discharge learning needs (meds, wound care, etc.)  - Arrange for interpretive services to assist at discharge as needed  - Refer to Case Management Department for coordinating discharge planning if the patient needs post-hospital services based on physician/advanced practitioner order or complex needs related to functional status, cognitive ability, or social support system  Outcome: Progressing     Problem: Knowledge Deficit  Goal: Patient/family/caregiver demonstrates understanding of disease process, treatment plan, medications, and discharge instructions  Description: Complete learning assessment and assess knowledge base. Interventions:  - Provide teaching at level of understanding  - Provide teaching via preferred learning methods  Outcome: Progressing     Problem: Nutrition/Hydration-ADULT  Goal: Nutrient/Hydration intake appropriate for improving, restoring or maintaining nutritional needs  Description: Monitor and assess patient's nutrition/hydration status for malnutrition. Collaborate with interdisciplinary team and initiate plan and interventions as ordered. Monitor patient's weight and dietary intake as ordered or per policy. Utilize nutrition screening tool and intervene as necessary. Determine patient's food preferences and provide high-protein, high-caloric foods as appropriate.      INTERVENTIONS:  - Monitor oral intake, urinary output, labs, and treatment plans  - Assess nutrition and hydration status and recommend course of action  - Evaluate amount of meals eaten  - Assist patient with eating if necessary   - Allow adequate time for meals  - Recommend/ encourage appropriate diets, oral nutritional supplements, and vitamin/mineral supplements  - Order, calculate, and assess calorie counts as needed  - Recommend, monitor, and adjust tube feedings and TPN/PPN based on assessed needs  - Assess need for intravenous fluids  - Provide specific nutrition/hydration education as appropriate  - Include patient/family/caregiver in decisions related to nutrition  Outcome: Progressing

## 2023-08-18 NOTE — PHYSICAL THERAPY NOTE
PHYSICAL THERAPY TREATMENT  NAME:  Milly Michael  DATE: 08/18/23    AGE:   70 y.o. Mrn:   [id number]  ADMIT DX:  Aphasia [R47.01]  Dysarthria [R47.1]  Stroke-like symptoms [R29.90]  Type 2 diabetes mellitus with diabetic peripheral angiopathy without gangrene, without long-term current use of insulin (HCC) [E11.51]  Problem List:   Patient Active Problem List   Diagnosis    Basilar artery stenosis    CVA (cerebral vascular accident) ([address])    Chronic GERD    Claudication in peripheral vascular disease ([address])    Type 2 diabetes mellitus with diabetic nephropathy (HCC)    Gout    Hx-TIA (transient ischemic attack)    Hypercholesteremia    Hyperlipidemia associated with type 2 diabetes mellitus ([address])    Hypertension    Hypertension associated with diabetes ([address])    Osteoarthritis    Obesity (BMI 30-39. 9)    Polyneuropathy    Right renal artery stenosis (HCC)    Vertebrobasilar artery syndrome    Vitamin D deficiency    Statin intolerance    Lumbar disc herniation    Spondylosis of lumbar region without myelopathy or radiculopathy    Aortoiliac stenosis, left (HCC)    Lumbosacral spondylosis without myelopathy    Hyperlipidemia, unspecified    Herniated lumbar intervertebral disc    Atherosclerosis of renal artery (HCC)    Celiac artery stenosis (HCC) >70% stenosis in the celiac trunk    Abnormal CT of the chest suspicious for an infectious or inflammatory bronchiolitis. Positive cardiac stress test  small, mildly severe, partially reversible myocardial perfusion defect of anterior and inferior wall     Femoral artery stenosis, right (HCC) 50-75% stenosis in the common femoral artery.     Mesenteric artery stenosis (HCC)    Median arcuate ligament syndrome (HCC)    Atherosclerosis of native arteries of extremities with intermittent claudication, bilateral legs (HCC)    Symptomatic carotid artery stenosis, left    Pulmonary nodule 7 mm groundglass opacity in the right upper lobe, unchanged since October 2019 Stenosis of left anterior descending artery Mid LAD 50-60% stenosis    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    Malignant neoplasm of overlapping sites of bladder (HCC)    Cervical radiculopathy    Stage 4 chronic kidney disease (HCC)    Hypertensive kidney disease with stage 4 chronic kidney disease (HCC)    Embolism and 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)    Depression, recurrent (HCC)    Obesity, morbid ([address])    Diabetes ([address])    Type 2 diabetes mellitus with diabetic peripheral angiopathy without gangrene ([address])    Gastrointestinal stromal tumor (GIST) ([address])    History of gastrointestinal stromal tumor (GIST)    Secondary hyperparathyroidism of renal origin ([address])    Aortoiliac occlusive disease ([address])    Hypertensive urgency    Dysarthria    Chronic kidney disease    Aphasia as late effect of cerebrovascular accident (CVA)       Past Medical History  Past Medical History:   Diagnosis Date    Arthritis     Chronic kidney disease     Endometriosis     High cholesterol     Hypertension     Kidney disease, chronic, stage III (moderate, EGFR 30-59 ml/min) (HCC)     Lumbar disc herniation     Neuropathy     Peripheral vascular disease (HCC)     Pneumonia     Shoulder injury     left    Spinal stenosis     Stroke ([address]) 2015    Memory loss       Past Surgical History  Past Surgical History:   Procedure Laterality Date    CARDIAC CATHETERIZATION      COLONOSCOPY      FL RETROGRADE PYELOGRAM  4/6/2020    FRACTURE SURGERY Right     ankle    OVARIAN CYST SURGERY      NV CYSTO BLADDER W/URETERAL CATHETERIZATION Bilateral 4/6/2020    Procedure: CYSTOSCOPY WITH RETROGRADE PYELOGRAM;  Surgeon: Umang Llanos MD;  Location: [facility];  Service: Urology    NV CYSTO W/INSERT URETERAL STENT Right 4/6/2020    Procedure: INSERTION STENT URETERAL;  Surgeon: Umang Llanos MD;  Location: [facility];  Service: Urology    NV CYSTO W/REMOVAL OF TUMORS SMALL N/A 4/6/2020    Procedure: TRANSURETHRAL RESECTION OF BLADDER TUMOR (TURBT); Surgeon: Malou Joshua MD;  Location: [facility];  Service: Urology    ROTATOR CUFF REPAIR Left     TONSILLECTOMY         Length Of Stay: 2  Performed at least 2 patient identifiers during session: Name and Birthday       08/18/23 1344   PT Last Visit   PT Visit Date 08/18/23   Note Type   Note Type Treatment   Pain Assessment   Pain Assessment Tool 0-10   Pain Score No Pain   Restrictions/Precautions   Weight Bearing Precautions Per Order No   Other Precautions Chair Alarm; Bed Alarm; Fall Risk;Telemetry;Multiple lines   General   Chart Reviewed Yes   Response to Previous Treatment Patient with no complaints from previous session. Family/Caregiver Present No   Cognition   Overall Cognitive Status Impaired   Arousal/Participation Alert; Responsive; Cooperative   Attention Attends with cues to redirect   Memory Decreased recall of recent events   Following Commands Follows one step commands without difficulty   Comments Pt agreeable to PT session   Subjective   Subjective "no pain thank goodness"   Bed Mobility   Supine to Sit 5  Supervision   Additional items Assist x 1;HOB elevated; Increased time required;Verbal cues   Sit to Supine   (CGA)   Additional items Assist x 1;HOB elevated; Increased time required;Verbal cues   Additional Comments Vitals at start of session BP: 145/82, SPo2: 99%, HR: 76bpm   Transfers   Sit to Stand 4  Minimal assistance   Additional items Assist x 1; Increased time required;Verbal cues   Stand to Sit 4  Minimal assistance   Additional items Assist x 1; Increased time required;Verbal cues   Stand pivot 4  Minimal assistance   Additional items Assist x 1; Increased time required;Verbal cues   Additional Comments RW used during transfers   Ambulation/Elevation   Gait pattern Decreased foot clearance; Forward Flexion; Redundant gait at times; Inconsistent kt; Short stride; Improper Weight shift   Gait Assistance 4 Minimal assist   Additional items Assist x 1;Verbal cues   Assistive Device Rolling walker   Distance 50'   Stair Management Assistance Not tested   Ambulation/Elevation Additional Comments moderate verbal cues to maintain walker close to self   Balance   Static Sitting Fair +   Dynamic Sitting Fair   Static Standing Fair -   Dynamic Standing Poor +   Ambulatory Poor +   Activity Tolerance   Activity Tolerance Patient tolerated treatment well   Assessment   Prognosis Good   Problem List Decreased strength;Decreased endurance; Impaired balance;Decreased mobility; Decreased safety awareness   Assessment Pt seen for PT treatment session this date, consisting of ther act focused on transfer training and bed mobility and gt training on level surfaces to improve pt safety in household environment. Since previous session, pt has made good progress in terms of ambulation tolerance; however, continues to require cues for safety awareness and proper use of RW. Pt greeted sitting up in bed and agreeable to PT session reporting 0/10 pain. Throughout session, pt did express and appear frustration in presence of expressive aphasia. PT assessed pts bed mobility this date with pt completed sit > sup CGA, sup > sit supervision with HOB elevated and increased time to complete. Pt required Thania for STS to RW and verbal cues for proper UE placement. Pt with increased tolerance to ambulation, tolerating up to 50' with RW Thania + moderate verbal cues to maintain walker close to self and assist in navigating obstacles in hallway. Pertinent barriers during this session include expressive aphasia, fatigue. Current goals and POC remain appropriate, pt continues to have rehab potential and is making good progress towards STGs. Pt prognosis for achieving goals is good, pending pt progress with hospitalization/medical status improvements, and indicated by orientation, ability to follow directions, motivation and supportive family/caregivers.  Pt limited d/t fatigue , safety awarenss. PT recommends post acute rehabilitation services upon discharge. Pt continues to be functioning below baseline level, and remains limited 2* factors listed above. PT will continue to see pt during current hospitalization in order to address the deficits listed above and provide interventions consistent w/ POC in effort to achieve STGs. Goals   Patient Goals to go to rehab   STG Expiration Date 08/26/23   PT Treatment Day 2   Plan   Treatment/Interventions LE strengthening/ROM; Functional transfer training; Therapeutic exercise;Patient/family training;Bed mobility;Gait training; Endurance training   Progress Progressing toward goals   PT Frequency 3-5x/wk   Recommendation   PT Discharge Recommendation Post acute rehabilitation services   AM-PAC Basic Mobility Inpatient   Turning in Flat Bed Without Bedrails 3   Lying on Back to Sitting on Edge of Flat Bed Without Bedrails 3   Moving Bed to Chair 3   Standing Up From Chair Using Arms 3   Walk in Room 3   Climb 3-5 Stairs With Railing 1   Basic Mobility Inpatient Raw Score 16   Basic Mobility Standardized Score 38.32   Highest Level Of Mobility   JH-HLM Goal 5: Stand one or more mins   JH-HLM Achieved 7: Walk 25 feet or more   End of Consult   Patient Position at End of Consult All needs within reach;Bed/Chair alarm activated; Bedside chair       Time In: 1344  Time Out: 1408  Total Treatment Minutes: 24    J Luis Baires, PT

## 2023-08-18 NOTE — PROGRESS NOTES
Progress Note - Neurology   [facility] 70 [facility] MRN: [id number]  Unit/Bed#: -01 Encounter: [id number]      Assessment/Plan     CVA (cerebral vascular accident) [facility]  [address] is a 70 y.o. right handed female with DM,HTN, HLD, CKD3, PVD, basilar artery stenosis and recent left PCA/MCA territory stroke on asa/brilinta/statin who presented to the ED 8/15/23 with reported worsening aphasia. Initial /55, . [address] without acute findings. She was admitted for further work-up with neurology consult    MRI showed new acute left occipital lobe infarcts. No new or worsening symptoms    Relevant home meds:  Asa 81mg daily started 23  Brilinta 90mg bid started 23  Pravastatin 80mg daily (Statins listed as allergy for patient (side effect of myalgia). Pt was agreeable to start pravastatin 80 mg daily on 23 with eventual consideration to switch to a PCSK9 inhibitor. Work-up:  Imagin/15/23 CTH: Evolving subacute left nonhemorrhagic posterior cerebral artery distribution infarction. 23 MRI brain:   1. Acute punctate infarcts in the left occipital lobe superimposed on the previously demonstrated subacute to early chronic infarct. 2. Punctate acute infarcts in the left MCA territory  Prior imagin23 carotid US: Impression  RIGHT:  There is 70+% stenosis noted in the internal carotid artery. Plaque is  heterogenous/homogenous and irregular/smooth. Vertebral artery flow is antegrade. There is no significant subclavian artery  disease. LEFT:  There is 70+% stenosis noted in the internal carotid artery. Plaque is  heterogenous/homogenous and irregular/smooth. Vertebral artery flow is antegrade. There is no significant subclavian artery  disease. Compared to previous study on 22 , there is an increase in disease in the  bilateral ICAs  23 MRI: 1. Large area of acute to subacute ischemia left PCA distribution occipital lobe.   2. Small foci of acute to subacute ischemia in the left frontal and left parietal/temporal lobes MCA distribution. 7/27/23 CTA head and neck:    1. Hypodensity and loss of gray-white matter differentiation involving left occipital and inferior parietal lobe concerning for evolving acute or subacute infarct. 2.  Chronic microangiopathic change. 3.  High-grade stenosis of the left PCA at proximal P2 segment. Occluded versus hypoplastic left P1 segment with patent P-comm perfusing left PCA. 4.  Severe left and moderate right bilateral supraclinoid ICA stenosis. 5.  Severe stenosis of the left vertebral artery origin. Patent narrow caliber left vertebral artery in the cervical and pre-PICA intracranial segments with multifocal severe stenosis. Post PICA segment is occluded. 6.  Multifocal high-grade stenosis of the right intracranial vertebral artery. 7.  Advanced atherosclerotic change of bilateral cervical carotid arteries. Estimated 85-90% left and 80% right proximal ICA stenosis. 8.  Atherosclerotic disease of the aortic arch with linear defect at the origin of left subclavian artery. Differentials include ulcerated plaque versus focal dissection flap (likely chronic). Pertinent labs:  Lipid panel: Total cholesterol 200, triglycerides 378, LDL 79 (7/28/23 Total cholesterol 368, triglycerides 329, )  A1c 8.5 (7/27/23 A1c 9.1)    Recommendations:  New embolic appearing left occipital lobe infarcts  - Stroke pathway  • Echo with bubble, NM stress pending  • Continue home Aspirin 81 mg daily and brilinta 90mg bid   • Continue home Pravastatin 80 mg daily  • Normotension, avoid hypotension  • Euglycemic  • Continue telemetry; consider outpatient cardiac event monitor  • PT/OT/ST  • Stroke education  • Continue to monitor and notify neurology with any changes.   • STAT CT head for any acute change in neuro exam  • Vascular surgery team following with plan for intervention with TCAR tentatively scheduled for 8/22/23 pending cardiac clearance. - Medical management and correction of any metabolic or infectious disturbances per primary service. - Patient will need outpatient follow-up with neurovascular team.           Aphasia as late effect of cerebrovascular accident (CVA)  Assessment & Plan  - Continue with speech therapy. Melanie Hendrickson will need follow up in in 6 weeks with neurovascular attending. She will not require outpatient neurological testing. Subjective:   Patient reports she is feeling better this morning. She notes she continues to have speech difficulty but denies any new or worsening symptoms. Medications  Scheduled Meds:  Current Facility-Administered Medications   Medication Dose Route Frequency Provider Last Rate   • allopurinol  100 mg Oral Daily Des Stephens MD     • amLODIPine  10 mg Oral Daily Des Stephens MD     • aspirin  81 mg Oral Daily Des Stephens MD     • cinacalcet  30 mg Oral Every Other Day Des Stephens MD     • cloNIDine  1 patch Transdermal Weekly Des Stephens MD     • fish oil  1,000 mg Oral TID Miles Castillo MD     • heparin (porcine)  5,000 Units Subcutaneous [facility] Des Stephens MD     • hydrALAZINE  5 mg Intravenous Q6H PRN Des Stephens MD     • insulin lispro  1-5 Units Subcutaneous TID AC Des Stephens MD     • insulin lispro  1-5 Units Subcutaneous HS Des Stephens MD     • labetalol  300 mg Oral BID Des Stephens MD     • pravastatin  80 mg Oral Daily With Noelle Dyer MD     • ticagrelor  90 mg Oral Q12H [facility] Des Stephens MD       Continuous Infusions:   PRN Meds:.•  hydrALAZINE    Vitals: Blood pressure (!) 174/79, pulse 75, temperature 97.9 °F (36.6 °C), temperature source Oral, resp. rate 18, height 4' 10" (1.473 m), weight 72.6 kg (160 lb), SpO2 99 %. ,Body mass index is 33.44 kg/m².     Physical Exam: BP (!) 174/79   Pulse 75   Temp 97.9 °F (36.6 °C) (Oral)   Resp 18   Ht 4' 10" (1.473 m)   Wt 72.6 kg (160 lb)   LMP  (LMP Unknown)   SpO2 99%   BMI 33.44 kg/m²   General appearance: alert and oriented, in no acute distress and speech is clear without evidence of dysarthria. Expressive aphasia noted. Head: Normocephalic, without obvious abnormality, atraumatic  Neurologic: Mental status: Alert, oriented, thought content appropriate, expressive aphasia noted  Cranial nerves: II: visual field homonymous hemianopsia on the right  Motor: Moving all extremities well and follows commands.      Labs   Recent Results (from the past 24 hour(s))   Fingerstick Glucose (POCT)    Collection Time: 08/17/23  4:22 PM   Result Value Ref Range    POC Glucose 209 (H) 65 - 140 mg/dl   Fingerstick Glucose (POCT)    Collection Time: 08/17/23  8:46 PM   Result Value Ref Range    POC Glucose 149 (H) 65 - 140 mg/dl   Basic metabolic panel    Collection Time: 08/18/23  4:36 AM   Result Value Ref Range    Sodium 137 135 - 147 mmol/L    Potassium 3.6 3.5 - 5.3 mmol/L    Chloride 108 96 - 108 mmol/L    CO2 19 (L) 21 - 32 mmol/L    ANION GAP 10 mmol/L    BUN 23 5 - 25 mg/dL    Creatinine 1.60 (H) 0.60 - 1.30 mg/dL    Glucose 171 (H) 65 - 140 mg/dL    Calcium 9.1 8.4 - 10.2 mg/dL    eGFR 31 ml/min/1.73sq m   CBC and differential    Collection Time: 08/18/23  4:36 AM   Result Value Ref Range    WBC 7.72 4.31 - 10.16 Thousand/uL    RBC 4.36 3.81 - 5.12 Million/uL    Hemoglobin 13.2 11.5 - 15.4 g/dL    Hematocrit 37.5 34.8 - 46.1 %    MCV 86 82 - 98 fL    MCH 30.3 26.8 - 34.3 pg    MCHC 35.2 31.4 - 37.4 g/dL    RDW 14.0 11.6 - 15.1 %    MPV 10.4 8.9 - 12.7 fL    Platelets 556 364 - 303 Thousands/uL    nRBC 0 /100 WBCs    Neutrophils Relative 60 43 - 75 %    Immat GRANS % 1 0 - 2 %    Lymphocytes Relative 23 14 - 44 %    Monocytes Relative 10 4 - 12 %    Eosinophils Relative 5 0 - 6 %    Basophils Relative 1 0 - 1 %    Neutrophils Absolute 4.71 1.85 - 7.62 Thousands/µL    Immature Grans Absolute 0.06 0.00 - 0.20 Thousand/uL    Lymphocytes Absolute 1.80 0.60 - 4.47 Thousands/µL    Monocytes Absolute 0.74 0.17 - 1.22 Thousand/µL    Eosinophils Absolute 0.35 0.00 - 0.61 Thousand/µL    Basophils Absolute 0.06 0.00 - 0.10 Thousands/µL   Fingerstick Glucose (POCT)    Collection Time: 08/18/23  7:24 AM   Result Value Ref Range    POC Glucose 163 (H) 65 - 140 mg/dl   Stress strip    Collection Time: 08/18/23  8:40 AM   Result Value Ref Range    Protocol Name LEXISCAN-SIT     Time In Exercise Phase 00:00:00     MAX. SYSTOLIC  mmHg    Max Diastolic Bp 79 mmHg    Max Heart Rate 75 BPM    Max Predicted Heart Rate 144 BPM    Reason for Termination      Test Indication Pre op eval for TCAR     Target Hr Formular (220 - Age)*85%     Arrhy During Ex      ECG Interp Before Ex      ECG Interp during Ex      Ex Summary Comment      Overall Hr Response To Exercise      Overall BP Response To Exercise     Stress strip    Collection Time: 08/18/23  8:49 AM   Result Value Ref Range    Protocol Name LEXISCAN-SIT     Time In Exercise Phase 00:03:00     MAX.  SYSTOLIC  mmHg    Max Diastolic Bp 83 mmHg    Max Heart Rate 97 BPM    Max Predicted Heart Rate 144 BPM    Reason for Termination Protocol Complete     Test Indication Pre op eval for TCAR     Target Hr Formular (220 - Age)*85%     Arrhy During Ex      ECG Interp Before Ex      ECG Interp during Ex      Ex Summary Comment      Chest Pain Statement non-limiting     Overall Hr Response To Exercise      Overall BP Response To Exercise     NM myocardial perfusion spect (rx stress and/or rest)    Collection Time: 08/18/23 10:25 AM   Result Value Ref Range    Baseline HR 75 bpm    Baseline /79 mmHg    O2 sat rest 99 %    Stress peak HR 97 bpm    Post peak  mmHg    Rate Pressure Product 18,818.0     O2 sat peak 100 %    Recovery HR 81 bpm    Recovery /79 mmHg    O2 sat recovery 100 %    Angina Index 1     Rest Nuclear Isotope Dose 11.00 mCi    Stress Nuclear Isotope Dose 28.20 mCi   Fingerstick Glucose (POCT)    Collection Time: 08/18/23 11:14 AM   Result Value Ref Range    POC Glucose 260 (H) 65 - 140 mg/dl   Echo complete w/ contrast if indicated    Collection Time: 08/18/23 12:00 PM   Result Value Ref Range    LA/Ao Ratio 2D 1.07     LA size 3.2 cm    LVPWd 1.30 cm    Left Atrium Area-systolic Apical Two Chamber 14.9 cm2    Left Atrium Area-systolic Four Chamber 78.7 cm2    MV E' Tissue Velocity Septal 6 cm/s    Tricuspid annular plane systolic excursion 6.51 cm    IVSd 2.25 cm    LV DIASTOLIC VOLUME (MOD BIPLANE) 2D 54 mL    LEFT VENTRICLE SYSTOLIC VOLUME (MOD BIPLANE) 2D 16 mL    Left ventricular stroke volume (2D) 38.00 mL    A4C EF 62 %    LVIDd 3.60 cm    IVS 1.3 cm    LVIDS 2.20 cm    FS 39 28 - 44 %    LA volume (BP) 44 mL    Asc Ao 3.4 cm    Ao root 3.00 cm    MV valve area p 1/2 method 3.55 cm2    E wave deceleration time 211 ms    MV Peak E Carlos A 75 cm/s    MV Peak A Carlos A 1.46 m/s    MV stenosis pressure 1/2 time 62 ms    LVSV, 2D 38 mL     Imaging   No new neuro imaging available for review. VTE Prophylaxis: Heparin    Counseling / Coordination of Care  Total time spent today 30 minutes. Greater than 50% of total time was spent with the patient and / or family counseling and / or coordination of care. A description of the counseling / coordination of care: Time spent discussing plan of care with patient at bedside and also with vascular surgery team. Patient is currently having cardiac work-up to risk stratify for possible TCAR on 8/22/23.

## 2023-08-18 NOTE — ASSESSMENT & PLAN NOTE
70year old female, former smoker, admitted 8/15/23 for expressive aphasia and persistent right-sided weakness. Patient was previously admitted 7/27/23 for similar symptoms and was found to have L MCA territory infarct, as well as acute to subacute ischemia of left PCA distribution. Evaluation at that time revealed severe bilateral carotid artery stenosis (Estimated 85-90% left and 80% right proximal ICA stenosis). Vascular surgery recommended DAPT, aggressive management of dyslipidemia with PCSK9 inhibitors, and intervention in 4 weeks. PCSK9 inhibitors unavailable, therefore patient was initiated on high intensity pravastatin. Neurology is following and believes worsening symptoms to be related to recrudescence of previous infarcts. Imaging:  CT head wo contrast 8/15/23:  Evolving subacute left nonhemorrhagic posterior cerebral artery distribution infarction    MRI brain wo contrast 8/16/23  Acute punctate infarcts in the left occipital lobe superimposed on the previously demonstrated subacute to early chronic infarct. Punctate acute infarcts in the left MCA territory. Plan:  -Patient will benefit from L TCAR. Scheduling moved up to to 8/22 at [facility] with Dr. Corinne Favor. Pending cardiac clearance  -Echo and stress this AM.  Awaiting forma cardiac risk starification and recommendations.  -Continue medical optimization with aspirin, ticagrelor, and pravastatin. -RICARDO on admission, improving  -Continue with clinical monitoring  -Discussed with neurology  -Discussed with slim  -PT/OT/speech; Case management coordinating disposition to acute rehab facility. Surgery scheduled 8/22 may need to discharge home in care of family, return for surgery and then discharged to acute rehab.   Awaiting input from case management.  -Continue management of multiple comorbidities per SLIM  -Will d/w Dr. Brandee Ventura

## 2023-08-18 NOTE — CASE MANAGEMENT
Case Management Discharge Planning Note    Patient name Libby Mckeon  Location [address] [facility] [place] 234/-71 MRN [id number]  : 1945 Date 2023       Current Admission Date: 8/15/2023  Current Admission [address]   Patient Active Problem List    Diagnosis Date Noted   • Aphasia as late effect of cerebrovascular accident (CVA) 2023   • Dysarthria 08/15/2023   • Chronic kidney disease    • Hypertensive urgency 2023   • Aortoiliac occlusive disease ([address]) 10/11/2022   • History of gastrointestinal stromal tumor (GIST) 2022   • Secondary hyperparathyroidism of renal origin ([address]) 2022   • 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   • Diabetes ([address]) 2021   • Type 2 diabetes mellitus with diabetic peripheral angiopathy without gangrene ([address]) 2021   • Embolism and 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 ([address]) 2021   • Depression, recurrent ([address]) 2021   • Obesity, morbid ([address]) 2021   • Stage 4 chronic kidney disease ([address]) 2020   • Hypertensive kidney disease with stage 4 chronic kidney disease ([address]) 2020   • Cervical radiculopathy 2020   • Malignant neoplasm of overlapping sites of bladder ([address]) 2020   • Stenosis of left anterior descending artery Mid LAD 50-60% stenosis 2020   • 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  2020   • Mesenteric artery stenosis (HCC) 2020   • Positive cardiac stress test  small, mildly severe, partially reversible myocardial perfusion defect of anterior and inferior wall  11/12/2019   • Abnormal CT of the chest suspicious for an infectious or inflammatory bronchiolitis. 11/07/2019   • Celiac artery stenosis (HCC) >70% stenosis in the celiac trunk 11/05/2019   • Median arcuate ligament syndrome ([address]) 11/05/2019   • Atherosclerosis of renal artery ([address]) 07/01/2019   • Aortoiliac stenosis, left (HCC) 02/25/2019   • Spondylosis of lumbar region without myelopathy or radiculopathy 01/29/2019   • Lumbosacral spondylosis without myelopathy 01/29/2019   • Statin intolerance 01/22/2019   • Lumbar disc herniation 01/22/2019   • Herniated lumbar intervertebral disc 01/22/2019   • Chronic GERD 12/04/2017   • Claudication in peripheral vascular disease ([address]) 12/04/2017   • Gout 12/04/2017   • Hx-TIA (transient ischemic attack) 12/04/2017   • Hyperlipidemia associated with type 2 diabetes mellitus ([address]) 12/04/2017   • Hypertension associated with diabetes ([address]) 12/04/2017   • Osteoarthritis 12/04/2017   • Obesity (BMI 30-39.9) 12/04/2017   • Right renal artery stenosis ([address]) 12/04/2017   • 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   • Hyperlipidemia, unspecified 12/04/2017   • Vitamin D deficiency 09/08/2016   • Basilar artery stenosis 06/22/2016   • Type 2 diabetes mellitus with diabetic nephropathy ([address]) 12/19/2015   • Hypercholesteremia 12/19/2015   • Hypertension 12/19/2015   • Polyneuropathy 12/19/2015   • CVA (cerebral vascular accident) ([address]) 11/09/2015      LOS (days): 2  Geometric Mean LOS (GMLOS) (days): 2.90  Days to GMLOS:0.8     OBJECTIVE:  Risk of Unplanned Readmission Score: 16.92         Current admission status: Inpatient   Preferred Pharmacy:   [address] [place] [id number]  [address] [place] [address]  Phone: [phone] Fax: [phone]    [facility]/pharmacy #[id number] [address] - [address] [place] [address]  Phone: [phone] Fax: [phone]    Primary Care Provider: Jayjay Freire MD    Primary Insurance: [employer] KILLIAN  Secondary Insurance:     DISCHARGE DETAILS:                                          Other Referral/Resources/Interventions Provided:  Referral Comments: Per SLIM rounds, pt has completed echo and stress test, w plans for loop recorder as outpt. Dr. Radha Aponte reports he is waiting for reports of testing; pt may be cleared for d/c as soon as this afternoon. Also made aware that pt is having her carotid procedure on 8/22 and will have an inpt stay of approx 2 days at [facility] for the surgery. Auth process has been initiated for placement at [facility], however, facility will not accept pt for such a minimal stay of several days. Pt also needs authorization. S/W son and explained new surgical date and effect on rehab placement. Son agreeable to pt d/c over weekend to home, then bringing pt to [facility] for surgery on Tues, 8/22. Son reports he is off on S/S/M so he is available to assist pt at home. He plans to take off on Tues for pt's procedure.          Treatment Team Recommendation: Short Term Rehab  Discharge Destination Plan[de-identified] Home  Transport at Discharge : Family                             IMM Given (Date):: 08/18/23  IMM Given to[de-identified] Patient  Family notified[de-identified] explained IMM over t/c w son Andrew Schroeder;  copy left at bedside

## 2023-08-18 NOTE — PLAN OF CARE
Problem: MOBILITY - ADULT  Goal: Maintain or return to baseline ADL function  Description: INTERVENTIONS:  -  Assess patient's ability to carry out ADLs; assess patient's baseline for ADL function and identify physical deficits which impact ability to perform ADLs (bathing, care of mouth/teeth, toileting, grooming, dressing, etc.)  - Assess/evaluate cause of self-care deficits   - Assess range of motion  - Assess patient's mobility; develop plan if impaired  - Assess patient's need for assistive devices and provide as appropriate  - Encourage maximum independence but intervene and supervise when necessary  - Involve family in performance of ADLs  - Assess for home care needs following discharge   - Consider OT consult to assist with ADL evaluation and planning for discharge  - Provide patient education as appropriate  Outcome: Progressing  Goal: Maintains/Returns to pre admission functional level  Description: INTERVENTIONS:  - Perform BMAT or MOVE assessment daily.   - Set and communicate daily mobility goal to care team and patient/family/caregiver. - Collaborate with rehabilitation services on mobility goals if consulted  - Perform Range of Motion    times a day. - Reposition patient every    hours.   - Dangle patient    times a day  - Stand patient    times a day  - Ambulate patient      times a day  - Out of bed to chair    times a day   - Out of bed for meals    times a day  - Out of bed for toileting  - Record patient progress and toleration of activity level   Outcome: Progressing     Problem: PAIN - ADULT  Goal: Verbalizes/displays adequate comfort level or baseline comfort level  Description: Interventions:  - Encourage patient to monitor pain and request assistance  - Assess pain using appropriate pain scale  - Administer analgesics based on type and severity of pain and evaluate response  - Implement non-pharmacological measures as appropriate and evaluate response  - Consider cultural and social influences on pain and pain management  - Notify physician/advanced practitioner if interventions unsuccessful or patient reports new pain  Outcome: Progressing     Problem: INFECTION - ADULT  Goal: Absence or prevention of progression during hospitalization  Description: INTERVENTIONS:  - Assess and monitor for signs and symptoms of infection  - Monitor lab/diagnostic results  - Monitor all insertion sites, i.e. indwelling lines, tubes, and drains  - Monitor endotracheal if appropriate and nasal secretions for changes in amount and color  - [place] appropriate cooling/warming therapies per order  - Administer medications as ordered  - Instruct and encourage patient and family to use good hand hygiene technique  - Identify and instruct in appropriate isolation precautions for identified infection/condition  Outcome: Progressing  Goal: Absence of fever/infection during neutropenic period  Description: INTERVENTIONS:  - Monitor WBC    Outcome: Progressing     Problem: SAFETY ADULT  Goal: Maintain or return to baseline ADL function  Description: INTERVENTIONS:  -  Assess patient's ability to carry out ADLs; assess patient's baseline for ADL function and identify physical deficits which impact ability to perform ADLs (bathing, care of mouth/teeth, toileting, grooming, dressing, etc.)  - Assess/evaluate cause of self-care deficits   - Assess range of motion  - Assess patient's mobility; develop plan if impaired  - Assess patient's need for assistive devices and provide as appropriate  - Encourage maximum independence but intervene and supervise when necessary  - Involve family in performance of ADLs  - Assess for home care needs following discharge   - Consider OT consult to assist with ADL evaluation and planning for discharge  - Provide patient education as appropriate  Outcome: Progressing  Goal: Maintains/Returns to pre admission functional level  Description: INTERVENTIONS:  - Perform BMAT or MOVE assessment daily.   - Set and communicate daily mobility goal to care team and patient/family/caregiver. - Collaborate with rehabilitation services on mobility goals if consulted  - Perform Range of Motion    times a day. - Reposition patient every      hours.   - Dangle patient    times a day  - Stand patient    times a day  - Ambulate patient    times a day  - Out of bed to chair      times a day   - Out of bed for meals    times a day  - Out of bed for toileting  - Record patient progress and toleration of activity level   Outcome: Progressing  Goal: Patient will remain free of falls  Description: INTERVENTIONS:  - Educate patient/family on patient safety including physical limitations  - Instruct patient to call for assistance with activity   - Consult OT/PT to assist with strengthening/mobility   - Keep Call bell within reach  - Keep bed low and locked with side rails adjusted as appropriate  - Keep care items and personal belongings within reach  - Initiate and maintain comfort rounds  - Make Fall Risk Sign visible to staff  - Offer Toileting every    Hours, in advance of need  - Initiate/Maintain   alarm  - Obtain necessary fall risk management equipment:   - Apply yellow socks and bracelet for high fall risk patients  - Consider moving patient to room near nurses station  Outcome: Progressing     Problem: DISCHARGE PLANNING  Goal: Discharge to home or other facility with appropriate resources  Description: INTERVENTIONS:  - Identify barriers to discharge w/patient and caregiver  - Arrange for needed discharge resources and transportation as appropriate  - Identify discharge learning needs (meds, wound care, etc.)  - Arrange for interpretive services to assist at discharge as needed  - Refer to Case Management Department for coordinating discharge planning if the patient needs post-hospital services based on physician/advanced practitioner order or complex needs related to functional status, cognitive ability, or social support system  Outcome: Progressing     Problem: Knowledge Deficit  Goal: Patient/family/caregiver demonstrates understanding of disease process, treatment plan, medications, and discharge instructions  Description: Complete learning assessment and assess knowledge base. Interventions:  - Provide teaching at level of understanding  - Provide teaching via preferred learning methods  Outcome: Progressing     Problem: Nutrition/Hydration-ADULT  Goal: Nutrient/Hydration intake appropriate for improving, restoring or maintaining nutritional needs  Description: Monitor and assess patient's nutrition/hydration status for malnutrition. Collaborate with interdisciplinary team and initiate plan and interventions as ordered. Monitor patient's weight and dietary intake as ordered or per policy. Utilize nutrition screening tool and intervene as necessary. Determine patient's food preferences and provide high-protein, high-caloric foods as appropriate.      INTERVENTIONS:  - Monitor oral intake, urinary output, labs, and treatment plans  - Assess nutrition and hydration status and recommend course of action  - Evaluate amount of meals eaten  - Assist patient with eating if necessary   - Allow adequate time for meals  - Recommend/ encourage appropriate diets, oral nutritional supplements, and vitamin/mineral supplements  - Order, calculate, and assess calorie counts as needed  - Recommend, monitor, and adjust tube feedings and TPN/PPN based on assessed needs  - Assess need for intravenous fluids  - Provide specific nutrition/hydration education as appropriate  - Include patient/family/caregiver in decisions related to nutrition  Outcome: Progressing

## 2023-08-18 NOTE — CASE MANAGEMENT
[facility] has received approved authorization from insurance: Pili Haines in by Rep: Luh Preston P# [phone]  Authorization received for: Acute Rehab  Facility: [address] #: [id number]  Start of Care: 8/18  Next Review Date: 8/23  Continued Stay Care Coordinator: Rachael EDGAR#: [phone]  Submit next review to: fax# [phone]  Care Manager notified: Jeremy Davenport    Per Karo Shoemaker was supposed to be for Acute Rehab. CM stated patient is now probably discharging home so no need to re-submit auth as acute.

## 2023-08-18 NOTE — PROGRESS NOTES
Cardiology Progress Note - Cecilia Barahona 70 y.o. female MRN: [id number]    Unit/Bed#: -01 Encounter: [id number]      Assessment/Plan:  1. Preoperative cardiac risk assessment to undergo TCAR. Given patient's history of CAD/multiple risk factors echocardiogram and stress test pending. Patient's risk assessment to be determined after these test have been done. Continue medications. 2.  CVA, acute punctate infarct in left occipital lobe superimposed on previously demonstrated subacute to early chronic infarct, punctate infarcts in the left MCA territory as seen on MRI. Follow stroke pathway. Management per [facility]/neurology. Continue aspirin, Norvasc, Catapres, pravastatin, Brilinta    3. Carotid artery stenosis, bilateral 70% stenosis. Per vascular surgery plan for TCAR next Tuesday. Continue Norvasc, aspirin, Catapres, pravastatin, Brilinta    4. CAD, cardiac cath in 2020 showed 50 to 60% stenosis of mid LAD after origin of D2, 90% status of proximal D2,  proximal RCA. Continue aspirin, Brilinta, pravastatin. Echocardiogram and stress test pending. 5.  Hypertension initially elevated now stable. Continue on medications    6. Hyperlipidemia on pravastatin    7. Hypokalemia, currently resolved. Continue to monitor    8. CKD, creatinine today 1.6. Continue to monitor    ADDENDUM: Stress test done; no ischemia, Echo done EF normal. MODERATE RISK for MACE from cardiac standpoint, ok to proceed with TCAR. Patient is stable from a cardiac standpoint, will sign off. Call if needed. Subjective:   Patient seen and examined. No significant events overnight. Im feeling good. Denies cp. Pt getting echo currently. Objective:     Vitals: Blood pressure 158/81, pulse 84, temperature 97.9 °F (36.6 °C), temperature source Oral, resp. rate 18, height 4' 10" (1.473 m), weight 72.6 kg (160 lb), SpO2 99 %. , Body mass index is 33.44 kg/m².,   Orthostatic Blood Pressures    Flowsheet Row Most Recent Value   Blood Pressure 158/81 filed at 08/18/2023 1438   Patient Position - Orthostatic VS Lying filed at 08/18/2023 0700            Intake/Output Summary (Last 24 hours) at 8/18/2023 1503  Last data filed at 8/17/2023 1910  Gross per 24 hour   Intake 300 ml   Output --   Net 300 ml         Physical Exam:  GEN: Alert and oriented, in no acute distress. Well appearing and well nourished. HEENT: Sclera anicteric, conjunctivae pink, mucous membranes moist. Oropharynx clear. NECK: Supple, no carotid bruits, no significant JVD. Trachea midline, no thyromegaly. HEART: Regular rhythm, normal S1 and S2, no murmurs, clicks, gallops or rubs. PMI nondisplaced, no thrills. LUNGS: Clear to auscultation bilaterally; no wheezes, rales, or rhonchi. No increased work of breathing or signs of respiratory distress. ABDOMEN: Soft, nontender, nondistended, normoactive bowel sounds. EXTREMITIES: Skin warm and well perfused, no clubbing, cyanosis, or edema. NEURO: Mild slurred speech. Mood and affect normal.   SKIN: Normal without suspicious lesions on exposed skin.       Medications:      Current Facility-Administered Medications:   •  allopurinol (ZYLOPRIM) tablet 100 mg, 100 mg, Oral, Daily, Des Ying MD, 100 mg at 08/18/23 0900  •  amLODIPine (NORVASC) tablet 10 mg, 10 mg, Oral, Daily, Des Ying MD, 10 mg at 08/18/23 0900  •  aspirin chewable tablet 81 mg, 81 mg, Oral, Daily, Des Ying MD, 81 mg at 08/18/23 0900  •  cinacalcet (SENSIPAR) tablet 30 mg, 30 mg, Oral, Every Other Day, Des Ying MD, 30 mg at 08/17/23 2211  •  cloNIDine (CATAPRES-TTS-3) 0.3 mg/24 hr TD weekly patch, 1 patch, Transdermal, Weekly, Des Ying MD, 0.3 mg at 08/15/23 0661  •  fish oil capsule 1,000 mg, 1,000 mg, Oral, TID, Daren Damon MD, 1,000 mg at 08/18/23 0900  •  heparin (porcine) subcutaneous injection 5,000 Units, 5,000 Units, Subcutaneous, Q8H [facility], 5,000 Units at 08/18/23 1422 **AND** [COMPLETED] Platelet count, , , Once, Des Barth MD  •  hydrALAZINE (APRESOLINE) injection 5 mg, 5 mg, Intravenous, Q6H PRN, Des Barth MD, 5 mg at 08/15/23 1859  •  insulin lispro (HumaLOG) 100 units/mL subcutaneous injection 1-5 Units, 1-5 Units, Subcutaneous, TID AC, 2 Units at 08/18/23 1200 **AND** Fingerstick Glucose (POCT), , , TID AC, Des Barth MD  •  insulin lispro (HumaLOG) 100 units/mL subcutaneous injection 1-5 Units, 1-5 Units, Subcutaneous, HS, Des Barth MD  •  labetalol (NORMODYNE) tablet 300 mg, 300 mg, Oral, BID, Des Barth MD, 300 mg at 08/18/23 0900  •  pravastatin (PRAVACHOL) tablet 80 mg, 80 mg, Oral, Daily With Garo Quintana MD, 80 mg at 08/17/23 1730  •  ticagrelor (BRILINTA) tablet 90 mg, 90 mg, Oral, Q12H [facility], Des Barth MD, 90 mg at 08/18/23 0900     Labs & Results:    Results from last 7 days   Lab Units 08/15/23  1526 08/15/23  1326 08/15/23  1126   HS TNI 0HR ng/L  --   --  5   HS TNI 2HR ng/L  --  5  --    HS TNI 4HR ng/L 4  --   --    HSTNI D4 ng/L -1  --   --      Results from last 7 days   Lab Units 08/18/23  0436 08/17/23  0531 08/15/23  1557 08/15/23  1126   WBC Thousand/uL 7.72 7.74  --  8.98   HEMOGLOBIN g/dL 13.2 13.3  --  14.4   HEMATOCRIT % 37.5 37.7  --  40.8   PLATELETS Thousands/uL 246 243 242 286     Results from last 7 days   Lab Units 08/16/23  0544   TRIGLYCERIDES mg/dL 378*   HDL mg/dL 45*     Results from last 7 days   Lab Units 08/18/23  0436 08/17/23  0531 08/15/23  1126   POTASSIUM mmol/L 3.6 3.4* 3.6   CHLORIDE mmol/L 108 108 106   CO2 mmol/L 19* 20* 21   BUN mg/dL 23 26* 26*   CREATININE mg/dL 1.60* 1.69* 1.89*   CALCIUM mg/dL 9.1 9.1 9.8   ALK PHOS U/L  --   --  87   ALT U/L  --   --  15   AST U/L  --   --  14

## 2023-08-18 NOTE — PLAN OF CARE
Problem: PHYSICAL THERAPY ADULT  Goal: Performs mobility at highest level of function for planned discharge setting. See evaluation for individualized goals. Description: Treatment/Interventions: Functional transfer training, LE strengthening/ROM, Therapeutic exercise, Endurance training, Cognitive reorientation, Patient/family training, Bed mobility, Gait training, Spoke to nursing, OT, Family          See flowsheet documentation for full assessment, interventions and recommendations. Outcome: Progressing  Note: Prognosis: Good  Problem List: Decreased strength, Decreased endurance, Impaired balance, Decreased mobility, Decreased safety awareness  Assessment: Pt seen for PT treatment session this date, consisting of ther act focused on transfer training and bed mobility and gt training on level surfaces to improve pt safety in household environment. Since previous session, pt has made good progress in terms of ambulation tolerance; however, continues to require cues for safety awareness and proper use of RW. Pt greeted sitting up in bed and agreeable to PT session reporting 0/10 pain. Throughout session, pt did express and appear frustration in presence of expressive aphasia. PT assessed pts bed mobility this date with pt completed sit > sup CGA, sup > sit supervision with HOB elevated and increased time to complete. Pt required Thania for STS to RW and verbal cues for proper UE placement. Pt with increased tolerance to ambulation, tolerating up to 50' with RW Thania + moderate verbal cues to maintain walker close to self and assist in navigating obstacles in hallway. Pertinent barriers during this session include expressive aphasia, fatigue. Current goals and POC remain appropriate, pt continues to have rehab potential and is making good progress towards STGs.  Pt prognosis for achieving goals is good, pending pt progress with hospitalization/medical status improvements, and indicated by orientation, ability to follow directions, motivation and supportive family/caregivers. Pt limited d/t fatigue , safety awarenss. PT recommends post acute rehabilitation services upon discharge. Pt continues to be functioning below baseline level, and remains limited 2* factors listed above. PT will continue to see pt during current hospitalization in order to address the deficits listed above and provide interventions consistent w/ POC in effort to achieve STGs. Barriers to Discharge: Other (Comment) (decline in functional mobility)     PT Discharge Recommendation: Post acute rehabilitation services    See flowsheet documentation for full assessment.       Rock Story; PT, DPT

## 2023-08-18 NOTE — SPEECH THERAPY NOTE
Speech Language/Pathology     Speech/Language Pathology Progress Note     Patient Name: Caroline BURROWS'X Date: 2023     Problem List  Principal Problem:    Dysarthria  Active Problems:    Basilar artery stenosis    CVA (cerebral vascular accident) ([address])    Chronic GERD    Claudication in peripheral vascular disease ([address])    Hypertension    Chronic kidney disease    Aphasia as late effect of cerebrovascular accident (CVA)     Subjective:  Patient received awake and alert, seated at edge of bed. Objective:  Patient participated in the following:  Confrontational namin% (same)  Object description/short phrases: ~50% w/ prompt and cues including phonemic cues and phrase completion strategy   Engaged in lengthy conversational discourse w/ noted signs of perseveration, hesitations, word retrieval difficulties and subsequent frustration. Paraphasias appear to have decreased, though remain evident on occasion. Reviewed strategies of using key words, taking a 7 second 'andres' to regain thought, categorization and slowing rate of speech. Pt verbalizes understanding. Family present and level of cueing/ strategies to facilitate word retrieval provided. Understanding verbalized      Assessment:  Mild-moderate expressive aphasia characterized by word retrieval difficulties w/ increased phrase length and at the conversational level. A significant amount of frustration is evident as a result.        Plan:  ST therapy x1-3 while in the acute care setting  Recommend follow up at the next level of care; post acute rehab services      Colby Panda, [id number] [facility], [address] [place] [address] Pathologist  [place] #[id number]  [place] #[id number]

## 2023-08-18 NOTE — PROGRESS NOTES
[address]  Progress Note  Name: Kobe Gil  MRN: [id number]  Unit/Bed#: -01 I Date of Admission: 8/15/2023   Date of Service: 8/18/2023 I Hospital Day: 2    Assessment/Plan   CVA (cerebral vascular accident) [facility])  Assessment & Plan  70year old female, former smoker, admitted 8/15/23 for expressive aphasia and persistent right-sided weakness. Patient was previously admitted 7/27/23 for similar symptoms and was found to have L MCA territory infarct, as well as acute to subacute ischemia of left PCA distribution. Evaluation at that time revealed severe bilateral carotid artery stenosis (Estimated 85-90% left and 80% right proximal ICA stenosis). Vascular surgery recommended DAPT, aggressive management of dyslipidemia with PCSK9 inhibitors, and intervention in 4 weeks. PCSK9 inhibitors unavailable, therefore patient was initiated on high intensity pravastatin. Neurology is following and believes worsening symptoms to be related to recrudescence of previous infarcts. Imaging:  CT head wo contrast 8/15/23:  Evolving subacute left nonhemorrhagic posterior cerebral artery distribution infarction    MRI brain wo contrast 8/16/23  Acute punctate infarcts in the left occipital lobe superimposed on the previously demonstrated subacute to early chronic infarct. Punctate acute infarcts in the left MCA territory. Plan:  -Patient will benefit from L TCAR. Scheduling moved up to to 8/22 at [facility] with Dr. Kenney Hernandez. Pending cardiac clearance  -Echo and stress this AM.  Awaiting forma cardiac risk starification and recommendations.  -Continue medical optimization with aspirin, ticagrelor, and pravastatin. -RICARDO on admission, improving  -Continue with clinical monitoring  -Discussed with neurology  -Discussed with slim  -PT/OT/speech; Case management coordinating disposition to acute rehab facility.  Surgery scheduled 8/22 may need to discharge home in care of family, return for surgery and then discharged to acute rehab. Awaiting input from case management.  -Continue management of multiple comorbidities per SLIM  -Will d/w Dr. Jeoffrey Eisenmenger         Subjective:  Patient sitting up in bed. No acute distress. No new focal neurological events. Had cardiac testing this morning    Vitals:  BP (!) 174/79   Pulse 75   Temp 97.9 °F (36.6 °C) (Oral)   Resp 18   Ht 4' 10" (1.473 m)   Wt 72.6 kg (160 lb)   LMP  (LMP Unknown)   SpO2 99%   BMI 33.44 kg/m²     I/Os:  I/O last 3 completed shifts: In: 300 [P.O.:300]  Out: -   No intake/output data recorded. Lab Results and Cultures:   Lab Results   Component Value Date    WBC 7.72 08/18/2023    HGB 13.2 08/18/2023    HCT 37.5 08/18/2023    MCV 86 08/18/2023     08/18/2023     Lab Results   Component Value Date    CALCIUM 9.1 08/18/2023    K 3.6 08/18/2023    CO2 19 (L) 08/18/2023     08/18/2023    BUN 23 08/18/2023    CREATININE 1.60 (H) 08/18/2023     Lab Results   Component Value Date    INR 1.07 01/22/2022    INR 0.96 03/24/2020    INR 1.0 02/10/2020    PROTIME 13.5 01/22/2022    PROTIME 12.7 03/24/2020    PROTIME 10.4 02/10/2020        Blood Culture:   Lab Results   Component Value Date    BLOODCX No Growth After 5 Days.  01/22/2022   ,   Urinalysis:   Lab Results   Component Value Date    COLORU Yellow 08/05/2023    CLARITYU Turbid 08/05/2023    SPECGRAV 1.018 08/05/2023    PHUR 5.5 08/05/2023    LEUKOCYTESUR Large (A) 08/05/2023    NITRITE Negative 08/05/2023    GLUCOSEU Negative 08/05/2023    KETONESU Negative 08/05/2023    BILIRUBINUR Negative 08/05/2023    BLOODU Negative 08/05/2023   ,   Urine Culture:   Lab Results   Component Value Date    URINECX >100,000 cfu/ml - Presumptive Escherichia coli (A) 04/06/2022   ,   Wound Culure: No results found for: "WOUNDCULT"    Medications:  Current Facility-Administered Medications   Medication Dose Route Frequency   • allopurinol (ZYLOPRIM) tablet 100 mg  100 mg Oral Daily   • amLODIPine (NORVASC) tablet 10 mg  10 mg Oral Daily   • aspirin chewable tablet 81 mg  81 mg Oral Daily   • cinacalcet (SENSIPAR) tablet 30 mg  30 mg Oral Every Other Day   • cloNIDine (CATAPRES-TTS-3) 0.3 mg/24 hr TD weekly patch  1 patch Transdermal Weekly   • fish oil capsule 1,000 mg  1,000 mg Oral TID   • heparin (porcine) subcutaneous injection 5,000 Units  5,000 Units Subcutaneous Q8H [address]   • hydrALAZINE (APRESOLINE) injection 5 mg  5 mg Intravenous Q6H PRN   • insulin lispro (HumaLOG) 100 units/mL subcutaneous injection 1-5 Units  1-5 Units Subcutaneous TID AC   • insulin lispro (HumaLOG) 100 units/mL subcutaneous injection 1-5 Units  1-5 Units Subcutaneous HS   • labetalol (NORMODYNE) tablet 300 mg  300 mg Oral BID   • pravastatin (PRAVACHOL) tablet 80 mg  80 mg Oral Daily With Dinner   • ticagrelor (BRILINTA) tablet 90 mg  90 mg Oral Q12H [address]       Imaging:  MRI brain wo contrast  Narrative: MRI BRAIN WITHOUT CONTRAST    INDICATION: Acute stroke. COMPARISON:   7/27/2023 and 8/15/2023    TECHNIQUE:  Multiplanar, multisequence imaging of the brain was performed. IMAGE QUALITY:  Diagnostic. FINDINGS:    BRAIN PARENCHYMA: Foci of restricted diffusion and T2/FLAIR hyperintensity in the left occipital lobe. Underlying late subacute to early chronic infarct    Few punctate foci of restricted diffusion in the left posterior frontal parietal region. Periventricular and subcortical T2/FLAIR hyperintense foci consistent with microangiopathic disease. No intracranial hemorrhage or mass effect. .    VENTRICLES: No hydrocephalus or extra-axial collection. SELLA AND PITUITARY GLAND: No sellar enlargement. ORBITS: Intact globes and orbits. PARANASAL SINUSES: Clear. VASCULATURE:  Evaluation of the major intracranial vasculature demonstrates appropriate flow voids. CALVARIUM AND SKULL BASE: No osseous lesion.     EXTRACRANIAL SOFT TISSUES: Fat-containing right posterior temporal scalp 3 cm lesion, possibly a sebaceous cyst or lipoma. Impression: 1. Acute punctate infarcts in the left occipital lobe superimposed on the previously demonstrated subacute to early chronic infarct. 2. Punctate acute infarcts in the left MCA territory. The study was marked in [facility] for immediate notification. Workstation performed: [id number]      Physical Exam:    General appearance: alert and oriented, in no acute distress  Skin: Skin color, texture, turgor normal. No rashes or lesions  Neurologic: Alert and oriented. Moving all extremities. Face symmetrical.  Expressive aphasia.   Lungs: Respirations regular, nonlabored  Heart: regular rate and rhythm  Extremities: extremities normal, warm and well-perfused; no cyanosis, clubbing, or edema    Pulse exam:  DP: Right: non-palpable Left: non-palpable  PT: Right: non-palpable Left: non-palpable      Herveolome GRACIELA King  8/18/2023  [facility]  [phone]

## 2023-08-18 NOTE — ASSESSMENT & PLAN NOTE
· Patient with worsening dysarthria with recent large stroke. · Potentially just evolution of her previous stroke  · Recent CTA done shows, among other lesions, Advanced atherosclerotic change of bilateral cervical carotid arteries. Estimated 85-90% left and 80% right proximal ICA stenosis.    · Continue aspirin and Brilinta for now  · PT/OT/SLP - recommending rehab, case management on board -patient may consider going home prior to going for procedure  · Vascular surgery consult - may need TCAR, timing to be determined but tentative for 8/22  · Monitor neurological exam

## 2023-08-18 NOTE — TELEPHONE ENCOUNTER
----- Message from Dustin Landers PA-C sent at 8/18/2023  3:51 PM EDT -----  Regarding: Isma Guillermo  Cardiology Follow-up:    Patient clinical visit in 6 week at the [address] [place] [address] location: [facility] office. .    Schedule visit with Card[facility] Providers: Hira Marks or first available provider. Type of Visit: VISIT TYPE: in-person office visit. Test ordered: Cardiac tests: .     Additional Details: pt has appt with dr randhawa on 8/24, currently in house and planned for carotid surgery on 8/22, pls cancel and reschedule for 6 weeks

## 2023-08-18 NOTE — PROGRESS NOTES
[address]  Progress Note  Name: Odette Castaneda  MRN: [id number]  Unit/Bed#: -01 I Date of Admission: 8/15/2023   Date of Service: 8/18/2023 I Hospital Day: 2    Assessment/Plan   * Dysarthria  Assessment & Plan  · Patient with worsening dysarthria with recent large stroke. · Potentially just evolution of her previous stroke  · Recent CTA done shows, among other lesions, Advanced atherosclerotic change of bilateral cervical carotid arteries. Estimated 85-90% left and 80% right proximal ICA stenosis. · Continue aspirin and Brilinta for now  · PT/OT/SLP - recommending rehab, case management on board -patient may consider going home prior to going for procedure  · Vascular surgery consult - may need TCAR, timing to be determined but tentative for 8/22  · Monitor neurological exam      CVA (cerebral vascular accident) [facility])  Assessment & Plan  · Continue aspirin and Brilinta for now  · Repeat MRI did reveal significant Acute punctate infarcts in the left occipital lobe superimposed on the previously demonstrated subacute to early chronic infarct.  /  Punctate acute infarcts in the left MCA territory. · Neurology on board - continue aspirin, ticagrelor, pravastatin, fish oil. · Follow TTE results    Aphasia as late effect of cerebrovascular accident (CVA)  Assessment & Plan  · Monitor  · SLP    Hypertension  Assessment & Plan  · Avoid hypotension         VTE Pharmacologic Prophylaxis:   Pharmacologic: Heparin  Mechanical VTE Prophylaxis in Place: Yes    Patient Centered Rounds: I have performed bedside rounds with nursing staff today. Discussions with Specialists or Other Care Team Provider: Discussed with care management team    Education and Discussions with Family / Patient: Patient    Time Spent for Care: 1 hour. More than 50% of total time spent on counseling and coordination of care as described above.     Current Length of Stay: 2 day(s)    Current Patient Status: Inpatient   Certification Statement: The patient will continue to require additional inpatient hospital stay due to need for placement, PT/OT re-eval    Discharge Plan: Once stable, 24h? Code Status: Level 1 - Full Code      Subjective:     Female in bed, awake  Dysarthria is slightly improved although still present    Objective:     Vitals:   Temp (24hrs), Av.8 °F (36.6 °C), Min:97.4 °F (36.3 °C), Max:98 °F (36.7 °C)    Temp:  [97.4 °F (36.3 °C)-98 °F (36.7 °C)] 97.9 °F (36.6 °C)  HR:  [65-84] 84  Resp:  [18] 18  BP: (117-174)/(55-84) 158/81  SpO2:  [95 %-99 %] 99 %  Body mass index is 33.44 kg/m². Input and Output Summary (last 24 hours): Intake/Output Summary (Last 24 hours) at 2023 1459  Last data filed at 2023 1910  Gross per 24 hour   Intake 300 ml   Output --   Net 300 ml       Physical Exam:     Physical Exam  Vitals and nursing note reviewed. Constitutional:       Appearance: Normal appearance. She is normal weight. Comments: Female in bed, awake   HENT:      Head: Normocephalic and atraumatic. Right Ear: External ear normal.      Left Ear: External ear normal.      Nose: Nose normal. No congestion. Mouth/Throat:      Mouth: Mucous membranes are moist.      Pharynx: Oropharynx is clear. No oropharyngeal exudate or posterior oropharyngeal erythema. Eyes:      General: No scleral icterus. Right eye: No discharge. Left eye: No discharge. Extraocular Movements: Extraocular movements intact. Conjunctiva/sclera: Conjunctivae normal.      Pupils: Pupils are equal, round, and reactive to light. Cardiovascular:      Rate and Rhythm: Normal rate and regular rhythm. Pulses: Normal pulses. Heart sounds: Normal heart sounds. No murmur heard. No friction rub. No gallop. Pulmonary:      Effort: Pulmonary effort is normal. No respiratory distress. Breath sounds: Normal breath sounds. No stridor. No wheezing, rhonchi or rales. Chest:      Chest wall: No tenderness. Abdominal:      General: Abdomen is flat. Bowel sounds are normal. There is no distension. Palpations: Abdomen is soft. There is no mass. Tenderness: There is no abdominal tenderness. There is no guarding or rebound. Musculoskeletal:         General: No swelling, tenderness, deformity or signs of injury. Normal range of motion. Cervical back: Normal range of motion and neck supple. No rigidity. No muscular tenderness. Skin:     General: Skin is warm and dry. Capillary Refill: Capillary refill takes less than 2 seconds. Coloration: Skin is not jaundiced or pale. Findings: No bruising, erythema, lesion or rash. Neurological:      General: No focal deficit present. Mental Status: She is alert and oriented to person, place, and time. Mental status is at baseline. Cranial Nerves: No cranial nerve deficit. Sensory: No sensory deficit. Motor: No weakness. Coordination: Coordination normal.      Comments: Patient still dysarthric although able to complete longer sentences. Still has occasional word finding difficulties   Psychiatric:         Mood and Affect: Mood normal.         Behavior: Behavior normal.         Thought Content:  Thought content normal.         Judgment: Judgment normal.           Additional Data:     Labs:    Results from last 7 days   Lab Units 08/18/23  0436   WBC Thousand/uL 7.72   HEMOGLOBIN g/dL 13.2   HEMATOCRIT % 37.5   PLATELETS Thousands/uL 246   NEUTROS PCT % 60   LYMPHS PCT % 23   MONOS PCT % 10   EOS PCT % 5     Results from last 7 days   Lab Units 08/18/23  0436 08/17/23  0531 08/15/23  1126   SODIUM mmol/L 137   < > 138   POTASSIUM mmol/L 3.6   < > 3.6   CHLORIDE mmol/L 108   < > 106   CO2 mmol/L 19*   < > 21   BUN mg/dL 23   < > 26*   CREATININE mg/dL 1.60*   < > 1.89*   ANION GAP mmol/L 10   < > 11   CALCIUM mg/dL 9.1   < > 9.8   ALBUMIN g/dL  --   --  4.3   TOTAL BILIRUBIN mg/dL  -- --  0.71   ALK PHOS U/L  --   --  87   ALT U/L  --   --  15   AST U/L  --   --  14   GLUCOSE RANDOM mg/dL 171*   < > 267*    < > = values in this interval not displayed. Results from last 7 days   Lab Units 08/18/23  1114 08/18/23  0724 08/17/23  2046 08/17/23  1622 08/17/23  1118 08/17/23  0704 08/16/23  2029 08/16/23  1633 08/16/23  1058 08/16/23  0712 08/15/23  2042 08/15/23  1657   POC GLUCOSE mg/dl 260* 163* 149* 209* 184* 196* 138 258* 205* 184* 138 230*     Results from last 7 days   Lab Units 08/16/23  0544   HEMOGLOBIN A1C % 8.5*               * I Have Reviewed All Lab Data Listed Above. * Additional Pertinent Lab Tests Reviewed: [facility] Admission Reviewed      Recent Cultures (last 7 days):           Last 24 Hours Medication List:   Current Facility-Administered Medications   Medication Dose Route Frequency Provider Last Rate   • allopurinol  100 mg Oral Daily Des Bahena MD     • amLODIPine  10 mg Oral Daily Des Bahena MD     • aspirin  81 mg Oral Daily Des Bahena MD     • cinacalcet  30 mg Oral Every Other Day Des Bahena MD     • cloNIDine  1 patch Transdermal Weekly Des Bahena MD     • fish oil  1,000 mg Oral TID Josh Ordaz MD     • heparin (porcine)  5,000 Units Subcutaneous Q8H [address] Des Bahena MD     • hydrALAZINE  5 mg Intravenous Q6H PRN Des Bahena MD     • insulin lispro  1-5 Units Subcutaneous TID AC Des Bahena MD     • insulin lispro  1-5 Units Subcutaneous HS Des Bahena MD     • labetalol  300 mg Oral BID Des Bahena MD     • pravastatin  80 mg Oral Daily With Promise Adkins MD     • ticagrelor  90 mg Oral Q12H Marion Thomas MD          Today, Patient Was Seen By: Josh Ordaz MD    ** Please Note: Dictation voice to text software may have been used in the creation of this document.  **

## 2023-08-18 NOTE — ASSESSMENT & PLAN NOTE
· Continue aspirin and Brilinta for now  · Repeat MRI did reveal significant Acute punctate infarcts in the left occipital lobe superimposed on the previously demonstrated subacute to early chronic infarct.  /  Punctate acute infarcts in the left MCA territory. · Neurology on board - continue aspirin, ticagrelor, pravastatin, fish oil.    · Follow TTE results

## 2023-08-19 VITALS
SYSTOLIC BLOOD PRESSURE: 175 MMHG | WEIGHT: 160 LBS | HEIGHT: 58 IN | BODY MASS INDEX: 33.58 KG/M2 | RESPIRATION RATE: 20 BRPM | OXYGEN SATURATION: 99 % | DIASTOLIC BLOOD PRESSURE: 84 MMHG | HEART RATE: 77 BPM | TEMPERATURE: 97.4 F

## 2023-08-19 LAB
GLUCOSE SERPL-MCNC: 196 MG/DL (ref 65–140)
GLUCOSE SERPL-MCNC: 254 MG/DL (ref 65–140)

## 2023-08-19 PROCEDURE — 99239 HOSP IP/OBS DSCHRG MGMT >30: CPT | Performed by: INTERNAL MEDICINE

## 2023-08-19 PROCEDURE — 82948 REAGENT STRIP/BLOOD GLUCOSE: CPT

## 2023-08-19 RX ORDER — TRAZODONE HYDROCHLORIDE 50 MG/1
50 TABLET ORAL
Qty: 30 TABLET | Refills: 0 | Status: ON HOLD | OUTPATIENT
Start: 2023-08-19

## 2023-08-19 RX ORDER — PRAVASTATIN SODIUM 80 MG/1
80 TABLET ORAL
Qty: 30 TABLET | Refills: 0 | Status: ON HOLD | OUTPATIENT
Start: 2023-08-19

## 2023-08-19 RX ORDER — ASPIRIN 81 MG/1
81 TABLET, CHEWABLE ORAL DAILY
Qty: 30 TABLET | Refills: 0 | Status: ON HOLD | OUTPATIENT
Start: 2023-08-19 | End: 2023-09-03

## 2023-08-19 RX ADMIN — INSULIN LISPRO 1 UNITS: 100 INJECTION, SOLUTION INTRAVENOUS; SUBCUTANEOUS at 09:09

## 2023-08-19 RX ADMIN — ASPIRIN 81 MG: 81 TABLET, CHEWABLE ORAL at 09:08

## 2023-08-19 RX ADMIN — AMLODIPINE BESYLATE 10 MG: 10 TABLET ORAL at 09:08

## 2023-08-19 RX ADMIN — ALLOPURINOL 100 MG: 100 TABLET ORAL at 09:08

## 2023-08-19 RX ADMIN — CINACALCET 30 MG: 30 TABLET, FILM COATED ORAL at 09:08

## 2023-08-19 RX ADMIN — LABETALOL HYDROCHLORIDE 300 MG: 100 TABLET, FILM COATED ORAL at 09:08

## 2023-08-19 RX ADMIN — OMEGA-3 FATTY ACIDS CAP 1000 MG 1000 MG: 1000 CAP at 09:08

## 2023-08-19 RX ADMIN — HEPARIN SODIUM 5000 UNITS: 5000 INJECTION INTRAVENOUS; SUBCUTANEOUS at 05:16

## 2023-08-19 RX ADMIN — TICAGRELOR 90 MG: 90 TABLET ORAL at 09:08

## 2023-08-19 RX ADMIN — INSULIN LISPRO 2 UNITS: 100 INJECTION, SOLUTION INTRAVENOUS; SUBCUTANEOUS at 11:55

## 2023-08-19 NOTE — DISCHARGE SUMMARY
[address]  Discharge- Cecilia Barahona 1945, 70 y.o. female MRN: [id number]  Unit/Bed#: MS 234Yosef Encounter: [id number]  Primary Care Provider: Ruddy Richards MD   Date and time admitted to hospital: 8/15/2023 10:55 AM    Discharge Diagnosis:    Stroke  Carotid stenosis  Dysarthria  Hypertension  Hyperlipidemia  Obesity    Discharging Physician / Practitioner: Aleyda Anderson MD  PCP: Ruddy Richards MD  Admission Date:   Admission Orders (From admission, onward)     Ordered        08/16/23 1222  Inpatient Admission  Once            08/15/23 1349  Place in Observation  Once                      Discharge Date: 08/19/23      Consultations During Hospital Stay:  · Neurology  · Cardiology  · [facility]  · Vascular     Outpatient follow up Requested:  · PCP  · Vascular surgery  · Neurology    Complications:  None    Reason for Admission: Dysarthria    HPI:  Cecilia Barahona is a 70 y.o. female who presents with difficulty getting words out. The patient had a stroke a few weeks ago and was sent home on Brilinta and aspirin. At that time did the did offer short-term rehab but the patient was adamant about going home. The patient comes in because she is worsening at home. Patient has been having difficulty getting her words out and states her left side is good but her right side is no good. According to the notes from last time the patient had some clumsiness in the right extremity as well as difficulty with word finding. The patient's word finding ability has worsened but the patient currently is a poor historian and has difficulty explaining why she is here. I did try to call the son for more information and left a voicemail for him. Patient denies any other symptoms. Denies any chest pain or shortness of breath. [facility] Course: The patient was hospitalized  MRI did confirm new strokes. Her symptoms are primarily related to expressive aphasia and dysarthria.   The patient was recommended being started on aspirin and Brilinta as per neurology which she is tolerating well. She was continued on pravastatin given previous intolerance to other statins. She was also continued on fish oil given hypertriglyceridemia. She was seen by vascular surgery and recommended TCAR on 8/22, patient and family aware of the schedule. Patient otherwise stable and motivated to get better. She wishes to do rehab after being discharged from [address] [place] [address] but for the meantime she wants to go home. The patient was seen by cardiology underwent cardiac work-up prior to procedure. Please refer to notes  She is being discharged in stable condition to home. Discussed with son he is aware of discharge planning. Condition at Discharge: good     Discharge Day Visit / Exam:     Subjective:    Patient related this morning. Still has some dysarthria. Denies any chest pain nausea or vomiting. Vitals: Blood Pressure: (!) 175/84 (08/19/23 1428)  Pulse: 77 (08/19/23 1100)  Temperature: (!) 97.4 °F (36.3 °C) (08/19/23 1100)  Temp Source: Oral (08/19/23 1100)  Respirations: 20 (08/19/23 0700)  Height: 4' 10" (147.3 cm) (08/18/23 1145)  Weight - Scale: 72.6 kg (160 lb) (08/18/23 1145)  SpO2: 99 % (08/19/23 1100)     Exam:   Physical Exam  Vitals and nursing note reviewed. Constitutional:       Appearance: Normal appearance. She is normal weight. Comments: Female in bed, awake   HENT:      Head: Normocephalic and atraumatic. Right Ear: External ear normal.      Left Ear: External ear normal.      Nose: Nose normal. No congestion. Mouth/Throat:      Mouth: Mucous membranes are moist.      Pharynx: Oropharynx is clear. No oropharyngeal exudate or posterior oropharyngeal erythema. Eyes:      General: No scleral icterus. Right eye: No discharge. Left eye: No discharge. Extraocular Movements: Extraocular movements intact.       Conjunctiva/sclera: Conjunctivae normal.      Pupils: Pupils are equal, round, and reactive to light. Cardiovascular:      Rate and Rhythm: Normal rate and regular rhythm. Pulses: Normal pulses. Heart sounds: Normal heart sounds. No murmur heard. No friction rub. No gallop. Pulmonary:      Effort: Pulmonary effort is normal. No respiratory distress. Breath sounds: Normal breath sounds. No stridor. No wheezing, rhonchi or rales. Chest:      Chest wall: No tenderness. Abdominal:      General: Abdomen is flat. Bowel sounds are normal. There is no distension. Palpations: Abdomen is soft. There is no mass. Tenderness: There is no abdominal tenderness. There is no guarding or rebound. Musculoskeletal:         General: No swelling, tenderness, deformity or signs of injury. Normal range of motion. Cervical back: Normal range of motion and neck supple. No rigidity. No muscular tenderness. Skin:     General: Skin is warm and dry. Capillary Refill: Capillary refill takes less than 2 seconds. Coloration: Skin is not jaundiced or pale. Findings: No bruising, erythema, lesion or rash. Neurological:      General: No focal deficit present. Mental Status: She is alert and oriented to person, place, and time. Mental status is at baseline. Cranial Nerves: No cranial nerve deficit. Sensory: No sensory deficit. Motor: No weakness. Coordination: Coordination normal.      Comments: Patient still dysarthric although able to complete longer sentences today compared to yesterday   Psychiatric:         Mood and Affect: Mood normal.         Behavior: Behavior normal.         Thought Content: Thought content normal.         Judgment: Judgment normal.         Discussion with Family: Discussed with son over the phone in regards to discharge planning    Discharge instructions/Information to patient and family:   See after visit summary for information provided to patient and family.       Provisions for Follow-Up Care:  See after visit summary for information related to follow-up care and any pertinent home health orders. Disposition:     Home    For Discharges to [facility] SNF:   · Not Applicable to this Patient - Not Applicable to this Patient    Planned Readmission: No     Discharge Statement:  I spent 91 minutes discharging the patient. This time was spent on the day of discharge. I had direct contact with the patient on the day of discharge. Greater than 50% of the total time was spent examining patient, answering all patient questions, arranging and discussing plan of care with patient as well as directly providing post-discharge instructions. Additional time then spent on discharge activities. Discharge Medications:  See after visit summary for reconciled discharge medications provided to patient and family.       ** Please Note: This note has been constructed using a voice recognition system **

## 2023-08-19 NOTE — PLAN OF CARE
Problem: INFECTION - ADULT  Goal: Absence or prevention of progression during hospitalization  Description: INTERVENTIONS:  - Assess and monitor for signs and symptoms of infection  - Monitor lab/diagnostic results  - Monitor all insertion sites, i.e. indwelling lines, tubes, and drains  - Monitor endotracheal if appropriate and nasal secretions for changes in amount and color  - [place] appropriate cooling/warming therapies per order  - Administer medications as ordered  - Instruct and encourage patient and family to use good hand hygiene technique  - Identify and instruct in appropriate isolation precautions for identified infection/condition  Outcome: Progressing

## 2023-08-19 NOTE — PLAN OF CARE
Paper fax received from [facility] Problem: MOBILITY - ADULT  Goal: Maintain or return to baseline ADL function  Description: INTERVENTIONS:  -  Assess patient's ability to carry out ADLs; assess patient's baseline for ADL function and identify physical deficits which impact ability to perform ADLs (bathing, care of mouth/teeth, toileting, grooming, dressing, etc.)  - Assess/evaluate cause of self-care deficits   - Assess range of motion  - Assess patient's mobility; develop plan if impaired  - Assess patient's need for assistive devices and provide as appropriate  - Encourage maximum independence but intervene and supervise when necessary  - Involve family in performance of ADLs  - Assess for home care needs following discharge   - Consider OT consult to assist with ADL evaluation and planning for discharge  - Provide patient education as appropriate  Outcome: Progressing  Goal: Maintains/Returns to pre admission functional level  Description: INTERVENTIONS:  - Perform BMAT or MOVE assessment daily.   - Set and communicate daily mobility goal to care team and patient/family/caregiver. - Collaborate with rehabilitation services on mobility goals if consulted  - Perform Range of Motion  times a day. - Reposition patient every hours.   - Dangle patient  times a day  - Stand patient  times a day  - Ambulate patient  times a day  - Out of bed to chair  times a day   - Out of bed for meals  times a day  - Out of bed for toileting  - Record patient progress and toleration of activity level   Outcome: Progressing

## 2023-08-21 ENCOUNTER — TELEPHONE (OUTPATIENT)
Dept: VASCULAR SURGERY | Facility: CLINIC | Age: 78
End: 2023-08-21

## 2023-08-21 ENCOUNTER — TRANSITIONAL CARE MANAGEMENT (OUTPATIENT)
Dept: FAMILY MEDICINE CLINIC | Facility: CLINIC | Age: 78
End: 2023-08-21

## 2023-08-21 ENCOUNTER — HOSPITAL ENCOUNTER (INPATIENT)
Facility: HOSPITAL | Age: 78
LOS: 3 days | Discharge: NURSING, REHAB OR LONG-TERM CARE | End: 2023-08-25
Attending: EMERGENCY MEDICINE | Admitting: FAMILY MEDICINE
Payer: COMMERCIAL

## 2023-08-21 ENCOUNTER — TELEPHONE (OUTPATIENT)
Dept: HEMATOLOGY ONCOLOGY | Facility: CLINIC | Age: 78
End: 2023-08-21

## 2023-08-21 ENCOUNTER — TELEPHONE (OUTPATIENT)
Dept: INTERNAL MEDICINE CLINIC | Facility: CLINIC | Age: 78
End: 2023-08-21

## 2023-08-21 DIAGNOSIS — N30.00 ACUTE CYSTITIS: ICD-10-CM

## 2023-08-21 DIAGNOSIS — I69.320 APHASIA AS LATE EFFECT OF CEREBROVASCULAR ACCIDENT (CVA): Primary | ICD-10-CM

## 2023-08-21 DIAGNOSIS — R53.1 WEAKNESS: ICD-10-CM

## 2023-08-21 LAB
ALBUMIN SERPL BCP-MCNC: 4.5 G/DL (ref 3.5–5)
ALP SERPL-CCNC: 100 U/L (ref 34–104)
ALT SERPL W P-5'-P-CCNC: 27 U/L (ref 7–52)
ANION GAP SERPL CALCULATED.3IONS-SCNC: 10 MMOL/L
AST SERPL W P-5'-P-CCNC: 24 U/L (ref 13–39)
BASOPHILS # BLD AUTO: 0.07 THOUSANDS/ÂΜL (ref 0–0.1)
BASOPHILS NFR BLD AUTO: 1 % (ref 0–1)
BILIRUB SERPL-MCNC: 0.72 MG/DL (ref 0.2–1)
BUN SERPL-MCNC: 28 MG/DL (ref 5–25)
CALCIUM SERPL-MCNC: 10.1 MG/DL (ref 8.4–10.2)
CHLORIDE SERPL-SCNC: 104 MMOL/L (ref 96–108)
CO2 SERPL-SCNC: 22 MMOL/L (ref 21–32)
CREAT SERPL-MCNC: 1.91 MG/DL (ref 0.6–1.3)
EOSINOPHIL # BLD AUTO: 0.32 THOUSAND/ÂΜL (ref 0–0.61)
EOSINOPHIL NFR BLD AUTO: 4 % (ref 0–6)
ERYTHROCYTE [DISTWIDTH] IN BLOOD BY AUTOMATED COUNT: 14.6 % (ref 11.6–15.1)
GFR SERPL CREATININE-BSD FRML MDRD: 25 ML/MIN/1.73SQ M
GLUCOSE SERPL-MCNC: 158 MG/DL (ref 65–140)
HCT VFR BLD AUTO: 39.6 % (ref 34.8–46.1)
HGB BLD-MCNC: 13.9 G/DL (ref 11.5–15.4)
IMM GRANULOCYTES # BLD AUTO: 0.09 THOUSAND/UL (ref 0–0.2)
IMM GRANULOCYTES NFR BLD AUTO: 1 % (ref 0–2)
LYMPHOCYTES # BLD AUTO: 1.76 THOUSANDS/ÂΜL (ref 0.6–4.47)
LYMPHOCYTES NFR BLD AUTO: 21 % (ref 14–44)
MCH RBC QN AUTO: 31 PG (ref 26.8–34.3)
MCHC RBC AUTO-ENTMCNC: 35.1 G/DL (ref 31.4–37.4)
MCV RBC AUTO: 88 FL (ref 82–98)
MONOCYTES # BLD AUTO: 0.79 THOUSAND/ÂΜL (ref 0.17–1.22)
MONOCYTES NFR BLD AUTO: 9 % (ref 4–12)
NEUTROPHILS # BLD AUTO: 5.4 THOUSANDS/ÂΜL (ref 1.85–7.62)
NEUTS SEG NFR BLD AUTO: 64 % (ref 43–75)
NRBC BLD AUTO-RTO: 0 /100 WBCS
PLATELET # BLD AUTO: 275 THOUSANDS/UL (ref 149–390)
PMV BLD AUTO: 10.2 FL (ref 8.9–12.7)
POTASSIUM SERPL-SCNC: 4.3 MMOL/L (ref 3.5–5.3)
PROT SERPL-MCNC: 7.8 G/DL (ref 6.4–8.4)
RBC # BLD AUTO: 4.48 MILLION/UL (ref 3.81–5.12)
SODIUM SERPL-SCNC: 136 MMOL/L (ref 135–147)
WBC # BLD AUTO: 8.43 THOUSAND/UL (ref 4.31–10.16)

## 2023-08-21 PROCEDURE — 85025 COMPLETE CBC W/AUTO DIFF WBC: CPT | Performed by: EMERGENCY MEDICINE

## 2023-08-21 PROCEDURE — 36415 COLL VENOUS BLD VENIPUNCTURE: CPT | Performed by: EMERGENCY MEDICINE

## 2023-08-21 PROCEDURE — 99285 EMERGENCY DEPT VISIT HI MDM: CPT

## 2023-08-21 PROCEDURE — 96360 HYDRATION IV INFUSION INIT: CPT

## 2023-08-21 PROCEDURE — 93005 ELECTROCARDIOGRAM TRACING: CPT

## 2023-08-21 PROCEDURE — 99222 1ST HOSP IP/OBS MODERATE 55: CPT

## 2023-08-21 PROCEDURE — 80053 COMPREHEN METABOLIC PANEL: CPT | Performed by: EMERGENCY MEDICINE

## 2023-08-21 RX ADMIN — SODIUM CHLORIDE 1000 ML: 0.9 INJECTION, SOLUTION INTRAVENOUS at 22:37

## 2023-08-21 NOTE — TELEPHONE ENCOUNTER
Appointment Schedule   Who are you speaking with? Patient   If it is not the patient, are they listed on an active communication consent form? Yes   Which provider is the appointment scheduled with? Lili Jensen PA-C   At which location is the appointment scheduled for? [facility]   When is the appointment scheduled? Please list date and time 9/25/23   What is the reason for this appointment? Rescheduling consult from Dr. Arlene Echevarria. Patient just got out of the hospital and is getting surgery soon. Did patient voice understanding of the details of this appointment? Yes   Was the no show policy reviewed with patient?  Yes

## 2023-08-21 NOTE — TELEPHONE ENCOUNTER
Jerman Rush [facility]) called to let us know that she could not complete the home visit. Patient is going back into the hospital tomorrow for surgery on the carotid artery. Rehab at [facility] has already been arranged for when the patient is discharged. Jerman Rush can be reached at [phone], if needed.

## 2023-08-21 NOTE — TELEPHONE ENCOUNTER
Verified patient's insurance   CONFIRMED - Patient's insurance is Adolonur Jayd  Is patient requesting a call when authorization has been obtained? Patient did not request a call. Surgery Date: 8-22-23  Primary Surgeon: [facility] // Hallie Dandy (NPI: [id number])  Assisting Surgeon: Not Applicable (N/A)  Facility: [facility] (Tax: [id number] / NPI: [id number])  Inpatient / Outpatient: Inpatient  Level: 2    Clearance Received: Yes, Cardiology . Consent Received: Consent will be signed day of procedure. Medication Hold / Last Dose: Not Applicable (N/A)  IR Notified: Not Applicable (N/A)  Rep. Notified: Yes  Equipment Needs: Not Applicable (N/A)  Vas Lab Requested: Not Applicable (N/A)  Patient Contacted: 8-21-23    Diagnosis:  I65.22  Procedure/ CPT Code(s): (TCAR)  Transcarotid Artery Revascularization // CPT: 63144    For varicose vein related procedures:   Last LEVDR: Not Applicable (N/A)  CEAP Classification: Not Applicable (N/A)  VCSS: Not Applicable (N/A)    Post Operative Date/ Time: TBD     *Please review medication hold(s), PATs, and check H&P with patient. *  PATIENT WAS MAILED SURGERY/SHOWERING/DISCHARGE/COVID INSTRUCTIONS AFTER REVIEWING WITH THEM VIA PHONE CALL.      Spoke to patients son about surgery tomorrow LLF

## 2023-08-21 NOTE — TELEPHONE ENCOUNTER
Authorization requirements reviewed. Please refer to Gila Campuzano / Dana Gomez number [id number] for case updates.

## 2023-08-21 NOTE — TELEPHONE ENCOUNTER
Spoke to patients son to let him know that we have canceled his mom's surgery for tomorrow and advised him to take her to the ER at [facility] to be evaluated.

## 2023-08-22 LAB
ANION GAP SERPL CALCULATED.3IONS-SCNC: 11 MMOL/L
BACTERIA UR QL AUTO: ABNORMAL /HPF
BASOPHILS # BLD AUTO: 0.06 THOUSANDS/ÂΜL (ref 0–0.1)
BASOPHILS NFR BLD AUTO: 1 % (ref 0–1)
BILIRUB UR QL STRIP: NEGATIVE
BUN SERPL-MCNC: 26 MG/DL (ref 5–25)
CALCIUM SERPL-MCNC: 9.4 MG/DL (ref 8.4–10.2)
CHEST PAIN STATEMENT: NORMAL
CHLORIDE SERPL-SCNC: 106 MMOL/L (ref 96–108)
CLARITY UR: ABNORMAL
CO2 SERPL-SCNC: 21 MMOL/L (ref 21–32)
COLOR UR: ABNORMAL
CREAT SERPL-MCNC: 1.79 MG/DL (ref 0.6–1.3)
EOSINOPHIL # BLD AUTO: 0.39 THOUSAND/ÂΜL (ref 0–0.61)
EOSINOPHIL NFR BLD AUTO: 4 % (ref 0–6)
ERYTHROCYTE [DISTWIDTH] IN BLOOD BY AUTOMATED COUNT: 14.6 % (ref 11.6–15.1)
GFR SERPL CREATININE-BSD FRML MDRD: 27 ML/MIN/1.73SQ M
GLUCOSE P FAST SERPL-MCNC: 166 MG/DL (ref 65–99)
GLUCOSE SERPL-MCNC: 138 MG/DL (ref 65–140)
GLUCOSE SERPL-MCNC: 145 MG/DL (ref 65–140)
GLUCOSE SERPL-MCNC: 153 MG/DL (ref 65–140)
GLUCOSE SERPL-MCNC: 165 MG/DL (ref 65–140)
GLUCOSE SERPL-MCNC: 166 MG/DL (ref 65–140)
GLUCOSE SERPL-MCNC: 95 MG/DL (ref 65–140)
GLUCOSE UR STRIP-MCNC: NEGATIVE MG/DL
HCT VFR BLD AUTO: 38.4 % (ref 34.8–46.1)
HGB BLD-MCNC: 13.2 G/DL (ref 11.5–15.4)
HGB UR QL STRIP.AUTO: NEGATIVE
IMM GRANULOCYTES # BLD AUTO: 0.12 THOUSAND/UL (ref 0–0.2)
IMM GRANULOCYTES NFR BLD AUTO: 1 % (ref 0–2)
KETONES UR STRIP-MCNC: NEGATIVE MG/DL
LEUKOCYTE ESTERASE UR QL STRIP: ABNORMAL
LYMPHOCYTES # BLD AUTO: 1.58 THOUSANDS/ÂΜL (ref 0.6–4.47)
LYMPHOCYTES NFR BLD AUTO: 16 % (ref 14–44)
MAX DIASTOLIC BP: 83 MMHG
MAX HEART RATE: 97 BPM
MAX PREDICTED HEART RATE: 144 BPM
MAX. SYSTOLIC BP: 194 MMHG
MCH RBC QN AUTO: 30.8 PG (ref 26.8–34.3)
MCHC RBC AUTO-ENTMCNC: 34.4 G/DL (ref 31.4–37.4)
MCV RBC AUTO: 90 FL (ref 82–98)
MONOCYTES # BLD AUTO: 0.83 THOUSAND/ÂΜL (ref 0.17–1.22)
MONOCYTES NFR BLD AUTO: 9 % (ref 4–12)
NEUTROPHILS # BLD AUTO: 6.66 THOUSANDS/ÂΜL (ref 1.85–7.62)
NEUTS SEG NFR BLD AUTO: 69 % (ref 43–75)
NITRITE UR QL STRIP: NEGATIVE
NON-SQ EPI CELLS URNS QL MICRO: ABNORMAL /HPF
NRBC BLD AUTO-RTO: 0 /100 WBCS
PH UR STRIP.AUTO: 5.5 [PH]
PLATELET # BLD AUTO: 248 THOUSANDS/UL (ref 149–390)
PMV BLD AUTO: 10.3 FL (ref 8.9–12.7)
POTASSIUM SERPL-SCNC: 4.2 MMOL/L (ref 3.5–5.3)
PROT UR STRIP-MCNC: ABNORMAL MG/DL
PROTOCOL NAME: NORMAL
RBC # BLD AUTO: 4.29 MILLION/UL (ref 3.81–5.12)
RBC #/AREA URNS AUTO: ABNORMAL /HPF
REASON FOR TERMINATION: NORMAL
SODIUM SERPL-SCNC: 138 MMOL/L (ref 135–147)
SP GR UR STRIP.AUTO: 1.01 (ref 1–1.03)
TARGET HR FORMULA: NORMAL
TEST INDICATION: NORMAL
TIME IN EXERCISE PHASE: NORMAL
UROBILINOGEN UR STRIP-ACNC: <2 MG/DL
WBC # BLD AUTO: 9.64 THOUSAND/UL (ref 4.31–10.16)
WBC #/AREA URNS AUTO: ABNORMAL /HPF

## 2023-08-22 PROCEDURE — 87186 SC STD MICRODIL/AGAR DIL: CPT | Performed by: EMERGENCY MEDICINE

## 2023-08-22 PROCEDURE — 81001 URINALYSIS AUTO W/SCOPE: CPT | Performed by: EMERGENCY MEDICINE

## 2023-08-22 PROCEDURE — 87086 URINE CULTURE/COLONY COUNT: CPT | Performed by: EMERGENCY MEDICINE

## 2023-08-22 PROCEDURE — 97167 OT EVAL HIGH COMPLEX 60 MIN: CPT

## 2023-08-22 PROCEDURE — 85025 COMPLETE CBC W/AUTO DIFF WBC: CPT

## 2023-08-22 PROCEDURE — 80048 BASIC METABOLIC PNL TOTAL CA: CPT

## 2023-08-22 PROCEDURE — 97163 PT EVAL HIGH COMPLEX 45 MIN: CPT

## 2023-08-22 PROCEDURE — 99285 EMERGENCY DEPT VISIT HI MDM: CPT | Performed by: EMERGENCY MEDICINE

## 2023-08-22 PROCEDURE — 97110 THERAPEUTIC EXERCISES: CPT

## 2023-08-22 PROCEDURE — 99233 SBSQ HOSP IP/OBS HIGH 50: CPT | Performed by: FAMILY MEDICINE

## 2023-08-22 PROCEDURE — 36415 COLL VENOUS BLD VENIPUNCTURE: CPT

## 2023-08-22 PROCEDURE — 82948 REAGENT STRIP/BLOOD GLUCOSE: CPT

## 2023-08-22 PROCEDURE — 87077 CULTURE AEROBIC IDENTIFY: CPT | Performed by: EMERGENCY MEDICINE

## 2023-08-22 RX ORDER — ALLOPURINOL 100 MG/1
100 TABLET ORAL DAILY
Status: DISCONTINUED | OUTPATIENT
Start: 2023-08-22 | End: 2023-08-25 | Stop reason: HOSPADM

## 2023-08-22 RX ORDER — TRAZODONE HYDROCHLORIDE 50 MG/1
50 TABLET ORAL
Status: DISCONTINUED | OUTPATIENT
Start: 2023-08-22 | End: 2023-08-25 | Stop reason: HOSPADM

## 2023-08-22 RX ORDER — ASPIRIN 81 MG/1
81 TABLET, CHEWABLE ORAL DAILY
Status: DISCONTINUED | OUTPATIENT
Start: 2023-08-22 | End: 2023-08-25 | Stop reason: HOSPADM

## 2023-08-22 RX ORDER — CLONIDINE 0.3 MG/24H
1 PATCH, EXTENDED RELEASE TRANSDERMAL WEEKLY
Status: DISCONTINUED | OUTPATIENT
Start: 2023-08-28 | End: 2023-08-25 | Stop reason: HOSPADM

## 2023-08-22 RX ORDER — CINACALCET 30 MG/1
30 TABLET, FILM COATED ORAL EVERY OTHER DAY
Status: DISCONTINUED | OUTPATIENT
Start: 2023-08-23 | End: 2023-08-25 | Stop reason: HOSPADM

## 2023-08-22 RX ORDER — INSULIN LISPRO 100 [IU]/ML
1-6 INJECTION, SOLUTION INTRAVENOUS; SUBCUTANEOUS
Status: DISCONTINUED | OUTPATIENT
Start: 2023-08-22 | End: 2023-08-25 | Stop reason: HOSPADM

## 2023-08-22 RX ORDER — PRAVASTATIN SODIUM 80 MG/1
80 TABLET ORAL
Status: DISCONTINUED | OUTPATIENT
Start: 2023-08-22 | End: 2023-08-25 | Stop reason: HOSPADM

## 2023-08-22 RX ORDER — LABETALOL 100 MG/1
300 TABLET, FILM COATED ORAL 2 TIMES DAILY
Status: DISCONTINUED | OUTPATIENT
Start: 2023-08-22 | End: 2023-08-25 | Stop reason: HOSPADM

## 2023-08-22 RX ORDER — HEPARIN SODIUM 5000 [USP'U]/ML
5000 INJECTION, SOLUTION INTRAVENOUS; SUBCUTANEOUS EVERY 8 HOURS SCHEDULED
Status: DISCONTINUED | OUTPATIENT
Start: 2023-08-22 | End: 2023-08-25 | Stop reason: HOSPADM

## 2023-08-22 RX ORDER — AMLODIPINE BESYLATE 10 MG/1
10 TABLET ORAL DAILY
Status: DISCONTINUED | OUTPATIENT
Start: 2023-08-22 | End: 2023-08-25 | Stop reason: HOSPADM

## 2023-08-22 RX ADMIN — LABETALOL HYDROCHLORIDE 300 MG: 100 TABLET, FILM COATED ORAL at 17:22

## 2023-08-22 RX ADMIN — TICAGRELOR 90 MG: 90 TABLET ORAL at 00:53

## 2023-08-22 RX ADMIN — ALLOPURINOL 100 MG: 100 TABLET ORAL at 12:35

## 2023-08-22 RX ADMIN — HEPARIN SODIUM 5000 UNITS: 5000 INJECTION INTRAVENOUS; SUBCUTANEOUS at 13:24

## 2023-08-22 RX ADMIN — TICAGRELOR 90 MG: 90 TABLET ORAL at 12:35

## 2023-08-22 RX ADMIN — HEPARIN SODIUM 5000 UNITS: 5000 INJECTION INTRAVENOUS; SUBCUTANEOUS at 22:57

## 2023-08-22 RX ADMIN — LABETALOL HYDROCHLORIDE 300 MG: 100 TABLET, FILM COATED ORAL at 12:35

## 2023-08-22 RX ADMIN — AMLODIPINE BESYLATE 10 MG: 10 TABLET ORAL at 12:35

## 2023-08-22 RX ADMIN — HEPARIN SODIUM 5000 UNITS: 5000 INJECTION INTRAVENOUS; SUBCUTANEOUS at 05:51

## 2023-08-22 RX ADMIN — TRAZODONE HYDROCHLORIDE 50 MG: 50 TABLET ORAL at 00:53

## 2023-08-22 RX ADMIN — PRAVASTATIN SODIUM 80 MG: 80 TABLET ORAL at 17:15

## 2023-08-22 RX ADMIN — TICAGRELOR 90 MG: 90 TABLET ORAL at 22:57

## 2023-08-22 RX ADMIN — ASPIRIN 81 MG: 81 TABLET, CHEWABLE ORAL at 12:35

## 2023-08-22 RX ADMIN — INSULIN LISPRO 1 UNITS: 100 INJECTION, SOLUTION INTRAVENOUS; SUBCUTANEOUS at 07:28

## 2023-08-22 RX ADMIN — TRAZODONE HYDROCHLORIDE 50 MG: 50 TABLET ORAL at 22:57

## 2023-08-22 NOTE — ASSESSMENT & PLAN NOTE
· Recent discharge s/p acute punctate stroke in the left occipital and left MCA territory, cont DAPT  · Unable to care for herself at home/ADLs  · Family and patient wanting placement  · No new neuro symptoms  · Regular diet as per speech  · Previous hospitalizations imaging reviewed: 70% ICA stenosis b/l. OP vascular followup

## 2023-08-22 NOTE — PROGRESS NOTES
[address]  Progress Note  Name: Rosalva Clarke  MRN: [id number]  Unit/Bed#: FT 05 I Date of Admission: 8/21/2023   Date of Service: 8/22/2023 I Hospital Day: 0    Assessment/Plan   * Aphasia as late effect of cerebrovascular accident (CVA)  Assessment & Plan  · Recent discharge s/p acute punctate stroke in the left occipital and left MCA territory, cont DAPT  · Unable to care for herself at home/ADLs  · Family and patient wanting placement  · No new neuro symptoms  · Regular diet as per speech  · Previous hospitalizations imaging reviewed: 70% ICA stenosis b/l. OP vascular followup    Stage 3b chronic kidney disease [facility])  Assessment & Plan  Lab Results   Component Value Date    EGFR 27 08/22/2023    EGFR 25 08/21/2023    EGFR 31 08/18/2023    CREATININE 1.79 (H) 08/22/2023    CREATININE 1.91 (H) 08/21/2023    CREATININE 1.60 (H) 08/18/2023     · Improved  · Baseline around 1.6-1.8  · Avoid nephrotoxic agents  · Holding home losartan  · On gentle IV hydration  · AM BMP     Type 2 diabetes mellitus with diabetic peripheral angiopathy without gangrene [facility])  Assessment & Plan    Lab Results   Component Value Date    HGBA1C 8.5 (H) 08/16/2023     · 153 this AM, acceptable control  · Hold home oral antihyperglycemics   · Correctional SSI  · Hypoglycemia protocol  · Diabetic diet     Obesity, morbid ([address])  Assessment & Plan  · Recommend weight loss via healthy diet and exercise   · Counseled regarding this for >3 m    Hypertension  Assessment & Plan  · BP (!) 179/72   Pulse 75   Temp 97.6 °F (36.4 °C) (Tympanic)   Resp 13   Ht 4' 10" (1.473 m)   Wt 74.8 kg (165 lb)   LMP  (LMP Unknown)   SpO2 95%   BMI 34.49 kg/m²   · Uncontrolled currently]  · Cont current meds             VTE Pharmacologic Prophylaxis: VTE Score: 9 High Risk (Score >/= 5) - Pharmacological DVT Prophylaxis Ordered: heparin. Sequential Compression Devices Ordered.     Patient Centered Rounds: I performed bedside rounds with nursing staff today. Discussions with Specialists or Other Care Team Provider: yes PT OT speech    Education and Discussions with Family / Patient: pt updated    Total Time Spent on Date of Encounter in care of patient: 25 minutes This time was spent on one or more of the following: performing physical exam; counseling and coordination of care; obtaining or reviewing history; documenting in the medical record; reviewing/ordering tests, medications or procedures; communicating with other healthcare professionals and discussing with patient's family/caregivers. Current Length of Stay: 0 day(s)  Current Patient Status: Observation   Certification Statement: The patient will continue to require additional inpatient hospital stay due to pending placement  Discharge Plan: pending placement    Code Status: Level 1 - Full Code    Subjective:   Seen and examined at bedside  No new complaints   Continues to have aphasia    Objective:     Vitals:   Temp (24hrs), Av.8 °F (36.6 °C), Min:97.6 °F (36.4 °C), Max:97.9 °F (36.6 °C)    Temp:  [97.6 °F (36.4 °C)-97.9 °F (36.6 °C)] 97.9 °F (36.6 °C)  HR:  [65-86] 85  Resp:  [13-22] 14  BP: (147-183)/(67-74) 151/72  SpO2:  [95 %-99 %] 99 %  Body mass index is 34.49 kg/m². Input and Output Summary (last 24 hours):      Intake/Output Summary (Last 24 hours) at 2023 1312  Last data filed at 2023 0037  Gross per 24 hour   Intake 1000 ml   Output --   Net 1000 ml       Physical Exam:   Physical Exam General- Awake, alert and oriented x 3, looks comfortable  HEENT- Normocephalic, atraumatic, oral mucosa- moist  Neck- Supple, No carotid bruit, no JVD  CVS- Normal S1/ S2, Regular rate and rhythm, No murmur, No edema  Respiratory system- B/L clear breath sounds, no wheezing  Abdomen- Soft, Non distended, no tenderness, Bowel sound- present 4 quads  Genitourinary- No suprapubic tenderness, No CVA tenderness  Skin- No new bruise or rash  Musculoskeletal- No gross deformity  Psych- No acute psychosis  CNS- right sided weakness noted 4/5, left side 5/5  CN II- XII grossly intact, No acute focal neurologic deficit noted      Additional Data:     Labs:  Results from last 7 days   Lab Units 08/22/23  0552   WBC Thousand/uL 9.64   HEMOGLOBIN g/dL 13.2   HEMATOCRIT % 38.4   PLATELETS Thousands/uL 248   NEUTROS PCT % 69   LYMPHS PCT % 16   MONOS PCT % 9   EOS PCT % 4     Results from last 7 days   Lab Units 08/22/23  0552 08/21/23  2140   SODIUM mmol/L 138 136   POTASSIUM mmol/L 4.2 4.3   CHLORIDE mmol/L 106 104   CO2 mmol/L 21 22   BUN mg/dL 26* 28*   CREATININE mg/dL 1.79* 1.91*   ANION GAP mmol/L 11 10   CALCIUM mg/dL 9.4 10.1   ALBUMIN g/dL  --  4.5   TOTAL BILIRUBIN mg/dL  --  0.72   ALK PHOS U/L  --  100   ALT U/L  --  27   AST U/L  --  24   GLUCOSE RANDOM mg/dL 166* 158*         Results from last 7 days   Lab Units 08/22/23  1057 08/22/23  0711 08/19/23  1056 08/19/23  0718 08/18/23  1959 08/18/23  1642 08/18/23  1114 08/18/23  0724 08/17/23  2046 08/17/23  1622 08/17/23  1118 08/17/23  0704   POC GLUCOSE mg/dl 138 153* 254* 196* 137 138 260* 163* 149* 209* 184* 196*     Results from last 7 days   Lab Units 08/16/23  0544   HEMOGLOBIN A1C % 8.5*           Lines/Drains:  Invasive Devices     Peripheral Intravenous Line  Duration           Peripheral IV 08/21/23 Right Antecubital <1 day                      Imaging: No pertinent imaging reviewed.     Recent Cultures (last 7 days):         Last 24 Hours Medication List:   Current Facility-Administered Medications   Medication Dose Route Frequency Provider Last Rate   • allopurinol  100 mg Oral Daily Fariba Hawkins PA-C     • amLODIPine  10 mg Oral Daily Fransisca Deutsch     • aspirin  81 mg Oral Daily Fransisca Deutsch     • [place] ON 8/23/2023] cinacalcet  30 mg Oral Every Other Day Fariba Hawkins PA-C     • [START ON 8/28/2023] cloNIDine  1 patch Transdermal Weekly Fariba Hawkins PA-C     • heparin (porcine) 5,000 Units Subcutaneous [place], [place]     • insulin lispro  1-6 Units Subcutaneous TID [place], [place]     • labetalol  300 mg Oral BID [place], [place]     • pravastatin  80 mg Oral Daily With [place], [place]     • ticagrelor  90 mg Oral Q12H [place], [place]     • traZODone  50 mg Oral  [place], [place]          Today, Patient Was Seen By: Vicky Serrano MD    **Please Note: This note may have been constructed using a voice recognition system. **

## 2023-08-22 NOTE — OCCUPATIONAL THERAPY NOTE
Occupational Therapy Evaluation        Patient Name: Ev SARAVIAXZS'Z Date: 8/22/2023 08/22/23 0746   OT Last Visit   OT Visit Date 08/22/23   Note Type   Note type Evaluation   Pain Assessment   Pain Assessment Tool 0-10   Pain Score No Pain   Restrictions/Precautions   Weight Bearing Precautions Per Order No   Braces or Orthoses Other (Comment)  (none at baseline)   Other Precautions Chair Alarm; Bed Alarm; Fall Risk;Pain;Visual impairment  (decreased vsion on right eye)   Home Living   Type of [address] [place] [address] Dr One level;Performs ADLs on one level; Able to live on main level with bedroom/bathroom; Ramped entrance   New Deepali shower seat;Commode   [address]; Wheelchair-manual;Cane   Additional Comments ambulatory with walker or cane PRN, also uses w/c intermittently. Prior Function   Level of [place] Independent with ADLs; Needs assistance with IADLS   Lives With [facility] Help From Family   IADLs Family/Friend/Other provides transportation; Family/Friend/Other provides meals; Family/Friend/Other provides medication management   Falls in the last 6 months 0   Vocational Retired   Lifestyle   Autonomy Patient lives with her Son and girlfriend in a one story house, with ramped entrance. patient ambulates with a walker or cane, uses the W/C intermittently for locomotion.    Reciprocal Relationships Supportive Family   Service to Others Retired   General   Family/Caregiver Present No   ADL   Eating Assistance 5  [address] 3  Moderate Assistance    N [facility] 2  Maximal Assistance   [address] 3  Moderate Assistance   LB Dressing Assistance 2  Maximal [address] [place]  2  Maximal Assistance   Functional Assistance 2  Maximal Assistance   Bed Mobility   Supine to Sit 4  Minimal assistance   Additional items Assist x 1;HOB elevated; Increased time required;Verbal cues;LE management   Sit to Supine 4  Minimal assistance   Additional items Assist x 1; Increased time required;Verbal cues;LE management   Transfers   Sit to Stand 4  Minimal assistance   Additional items Assist x 2; Increased time required;Verbal cues   Stand to Sit 4  Minimal assistance   Additional items Assist x 2; Increased time required;Verbal cues   Functional Mobility   Functional Mobility 4  Minimal assistance   Additional Comments assist of 2 with RW/ difficulty advancing right leg and maintaining base support. Additional items Rolling walker   Balance   Static Sitting Fair +   Dynamic Sitting Fair   Static Standing Poor   Dynamic Standing Poor -   Activity Tolerance   Activity Tolerance Patient limited by fatigue;Patient limited by pain   Medical Staff Made Aware Pt seen as a co-eval with PT due to the patient's co-morbidities, clinically unstable presentation, and present impairments which are a regression from the patient's baseline. RUE Assessment   RUE Assessment X  (limited overhead movements, strength deficit, bilaterally R > L)   RUE Strength   RUE Overall Strength Deficits  (3+/5)   LUE Assessment   LUE Assessment X  (limited overhead movements, strength deficit, bilaterally R > L)   LUE Strength   LUE Overall Strength Deficits  (3+/5)   Hand Function   Gross Motor Coordination Impaired   Fine Motor Coordination Impaired   Sensation   Light Touch No apparent deficits  (BUEs)   Vision-Basic Assessment   Current Vision Wears glasses all the time   Vision - Complex Assessment   Tracking Intact   Visual Fields Difficulty detecting stimulus with OD RUQ; Difficulty detecting stimulus with OD RLQ   Visual Screen Results Visual processing deficits   Psychosocial   Psychosocial (WDL) WDL   Cognition   Overall Cognitive Status WFL   Arousal/Participation Alert; Responsive; Cooperative Attention Within functional limits   Orientation Level Oriented X4   Memory Within functional limits   Following Commands Follows all commands and directions without difficulty   Assessment   Limitation Decreased ADL status; Decreased UE ROM; Decreased UE strength;Decreased endurance;Decreased self-care trans;Decreased high-level ADLs   Prognosis Good   Assessment Patient is a 70 y.o. female seen for OT evaluation s/p admit to [facility] w/Aphasia as late effect of cerebrovascular accident (CVA). Commorbidities affecting patient's functional performance at time of assessment include: CKD, Type 2 DM, Obesity, HTN, h/o of recent CVA with aphasia. Orders placed for OT evaluation and treatment. Performed at least two patient identifiers during session including name and wristband. Prior to admission, Patient lives with her Son and girlfriend in a one story house, with ramped entrance. patient ambulates with a walker or cane, uses the W/C intermittently for locomotion. Personal factors affecting patient at time of initial evaluation include: limited caregiver support, limited insight into deficits, decreased initiation and engagement, difficulty performing ADLs and difficulty performing IADLs. Upon evaluation, patient requires minimal  assist for UB ADLs, maximal assist for LB ADLs. Occupational performance is affected by the following deficits: decreased functional use of BUEs, limited active ROM, decreased muscle strength, degenerative arthritic joint changes, impaired gross motor coordination, dynamic sit/ stand balance deficit with poor standing tolerance time for self care and functional mobility, decreased activity tolerance, impaired depth perception, decreased safety awareness, increased pain and postural control and postural alignment deficit, requiring external assistance to complete transitional movements.   Patient to benefit from continued Occupational Therapy treatment while in the hospital to address deficits as defined above and maximize level of functional independence with ADLs and functional mobility. Occupational Performance areas to address include: bathing/ shower, dressing, toilet hygiene, transfer to all surfaces, functional mobility, emergency response, IADLs: safety procedures and Leisure Participation. From OT standpoint, recommendation at time of d/c would be Short Term Rehab. Plan   Treatment Interventions ADL retraining;Functional transfer training;UE strengthening/ROM; Endurance training;Cognitive reorientation;Patient/family training;Fine motor coordination activities; Compensatory technique education;Continued evaluation; Energy conservation; Activityengagement   Goal Expiration Date 09/05/23   OT Frequency 3-5x/wk   Recommendation   OT Discharge Recommendation Post acute rehabilitation services   AM-PAC Daily Activity Inpatient   Lower Body Dressing 2   Bathing 2   Toileting 2   Upper Body Dressing 2   Grooming 3   Eating 4   Daily Activity Raw Score 15   Daily Activity Standardized Score (Calc for Raw Score >=11) 34.69   AM-PAC Applied Cognition Inpatient   Following a Speech/Presentation 4   Understanding Ordinary Conversation 3   Taking Medications 2   Remembering Where Things Are Placed or Put Away 2   Remembering List of 4-5 Errands 2   Taking Care of Complicated Tasks 1   Applied Cognition Raw Score 14   Applied Cognition Standardized Score 32.02   Barthel Index   Feeding 5   Bathing 0   Grooming Score 0   Dressing Score 5   Bladder Score 5   Bowels Score 10   Toilet Use Score 5   Transfers (Bed/Chair) Score 10   Mobility (Level Surface) Score 0   Stairs Score 0   Barthel Index Score 40         1 - Patient will verbalize and demonstrate use of energy conservation/ deep breathing technique and work simplification skills during functional activity with no verbal cues.     2 - Patient will verbalize and demonstrate good body mechanics and joint protection techniques during  ADLs/ IADLs with no verbal cues. 3 - Patient will increase OOB/ sitting tolerance to 2-4 hours per day for increased participation in self care and leisure tasks with no s/s of exertion. 4 - Patient will increase standing tolerance time to 5  minutes with unilateral UE support to complete sink level ADLs@ mod I level. 5 - Patient will increase sitting tolerance at edge of bed to 20 minutes to complete UB ADLs @ set up assist level. 6 - Patient will transfer bed to Chair / toilet at Set up assist level with AD as indicated. 7 - Patient will complete UB ADLs with set up assist.     8 - Patient will complete LB ADLs with min assist with the use of adaptive equipment.      9 - Patient will complete toileting hygiene with set up assist/ supervision for thoroughness    10 - Patient/ Family  will demonstrate competency with UE Home Exercise Program.

## 2023-08-22 NOTE — PHYSICAL THERAPY NOTE
Physical Therapy Evaluation     Patient's Name: Cecilia Barahona    Admitting Diagnosis  Weakness generalized [R53.1]    Problem List  Patient Active Problem List   Diagnosis    Basilar artery stenosis    CVA (cerebral vascular accident) ([address])    Chronic GERD    Stage 3 chronic kidney disease ([address])    Claudication in peripheral vascular disease ([address])    Type 2 diabetes mellitus with diabetic nephropathy (HCC)    Gout    Hx-TIA (transient ischemic attack)    Hypercholesteremia    Hyperlipidemia associated with type 2 diabetes mellitus ([address])    Hypertension    Hypertension associated with diabetes ([address])    Osteoarthritis    Obesity (BMI 30-39. 9)    Polyneuropathy    Right renal artery stenosis (HCC)    Vertebrobasilar artery syndrome    Vitamin D deficiency    Statin intolerance    Lumbar disc herniation    Spondylosis of lumbar region without myelopathy or radiculopathy    Aortoiliac stenosis, left (HCC)    Lumbosacral spondylosis without myelopathy    Hyperlipidemia, unspecified    Herniated lumbar intervertebral disc    Atherosclerosis of renal artery (HCC)    Celiac artery stenosis (HCC) >70% stenosis in the celiac trunk    Abnormal CT of the chest suspicious for an infectious or inflammatory bronchiolitis. Positive cardiac stress test  small, mildly severe, partially reversible myocardial perfusion defect of anterior and inferior wall     Femoral artery stenosis, right (HCC) 50-75% stenosis in the common femoral artery.     Mesenteric artery stenosis (HCC)    Median arcuate ligament syndrome (HCC)    Atherosclerosis of native arteries of extremities with intermittent claudication, bilateral legs (HCC)    Symptomatic carotid artery stenosis, left    Pulmonary nodule 7 mm groundglass opacity in the right upper lobe, unchanged since October 2019    Stenosis of left anterior descending artery Mid LAD 50-60% stenosis    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 Malignant neoplasm of overlapping sites of bladder [facility])    Cervical radiculopathy    Stage 4 chronic kidney disease (HCC)    Hypertensive kidney disease with stage 4 chronic kidney disease (HCC)    Embolism and 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)    Depression, recurrent (HCC)    Obesity, morbid (HCC)    Diabetes ([address])    Type 2 diabetes mellitus with diabetic peripheral angiopathy without gangrene ([address])    Gastrointestinal stromal tumor (GIST) ([address])    History of gastrointestinal stromal tumor (GIST)    Secondary hyperparathyroidism of renal origin ([address])    Aortoiliac occlusive disease ([address])    Hypertensive urgency    Dysarthria    Stage 3b chronic kidney disease ([address])    Aphasia as late effect of cerebrovascular accident (CVA)     Past Medical History  Past Medical History:   Diagnosis Date    Arthritis     Chronic kidney disease     Diabetes mellitus ([address])     Endometriosis     High cholesterol     Hypertension     Kidney disease, chronic, stage III (moderate, EGFR 30-59 ml/min) (HCC)     Lumbar disc herniation     Neuropathy     Peripheral vascular disease (HCC)     Pneumonia     Shoulder injury     left    Spinal stenosis     Stroke ([address]) 2015    Memory loss     Past Surgical History  Past Surgical History:   Procedure Laterality Date    CARDIAC CATHETERIZATION      COLONOSCOPY      FL RETROGRADE PYELOGRAM  4/6/2020    FRACTURE SURGERY Right     ankle    OVARIAN CYST SURGERY      MT CYSTO BLADDER W/URETERAL CATHETERIZATION Bilateral 4/6/2020    Procedure: CYSTOSCOPY WITH RETROGRADE PYELOGRAM;  Surgeon: Jose Antonio Monteiro MD;  [facility];  Service: Urology    MT CYSTO W/INSERT URETERAL STENT Right 4/6/2020    Procedure: INSERTION STENT URETERAL;  Surgeon: Jose Antonio Monteiro MD;  [facility];  Service: Urology    MT CYSTO W/REMOVAL OF TUMORS SMALL N/A 4/6/2020    Procedure: TRANSURETHRAL RESECTION OF BLADDER TUMOR (TURBT);   Surgeon: Jose Antonio Monteiro MD;  [facility]; Service: Urology    ROTATOR CUFF REPAIR Left     TONSILLECTOMY        08/22/23 0745   PT Last Visit   PT Visit Date 08/22/23   Note Type   Note type Evaluation and Treatment   Pain Assessment   Pain Assessment Tool 0-10   Pain Score No Pain   Restrictions/Precautions   Weight Bearing Precautions Per Order No   Braces or Orthoses Other (Comment)  (none per patient)   Other Precautions Chair Alarm; Bed Alarm;Cognitive;Multiple lines;Telemetry; Fall Risk;Visual impairment  (impaired vision R eye)   Home Living   Type of [address] [place] [address] One level; Able to live on main level with bedroom/bathroom; Performs ADLs on one level;Ramped entrance   Bathroom Shower/Tub Walk-in shower   Bathroom Toilet Standard   Bathroom Equipment Built-in shower seat;Commode   [address] Walker;Cane;Wheelchair-manual   Additional Comments Pt ambulates with a cane. Prior Function   Level of [place] Independent with functional mobility; Independent with ADLs; Needs assistance with IADLS  (pt has been recently requiring increased assistance at home)   Lives With [place] Help From Family   IADLs Family/Friend/Other provides transportation; Family/Friend/Other provides meals; Family/Friend/Other provides medication management   Falls in the last 6 months 0   Vocational Retired   General   Family/Caregiver Present No   Cognition   Overall Cognitive Status Impaired   Arousal/Participation Alert   Attention Attends with cues to redirect   Orientation Level Oriented to person;Oriented to place; Disoriented to time  (inconsistent to situation)   Memory Decreased recall of recent events   Following Commands Follows one step commands with increased time or repetition   Comments Pt with aphasia, but agreeable to PT.    Subjective   Subjective "Weak."   RLE Assessment   RLE Assessment X   Strength RLE   R Hip Flexion 3/5   R Knee Extension 3+/5   R Ankle Dorsiflexion 3-/5   LLE Assessment   LLE Assessment X   Strength LLE   L Hip Flexion 4-/5   L Knee Extension 4-/5   L Ankle Dorsiflexion 4-/5   Vision-Basic Assessment   Current Vision Other (Comment)  (impaired right eye vision)   Light Touch   RLE Light Touch Impaired   RLE Light Touch Comments decreased compared to L LE   LLE Light Touch Grossly intact   Bed Mobility   Supine to Sit 4  Minimal assistance   Additional items Assist x 1;HOB elevated; Increased time required;Verbal cues;LE management   Sit to Supine 4  Minimal assistance   Additional items Assist x 1; Increased time required;Verbal cues;LE management   Transfers   Sit to Stand 4  Minimal assistance   Additional items Assist x 2; Increased time required;Verbal cues   Stand to Sit 4  Minimal assistance   Additional items Assist x 2; Increased time required;Verbal cues   Ambulation/Elevation   Gait pattern Decreased toe off;Decreased heel strike;Decreased hip extension; Excessively slow; Step to;Short stride; Shuffling;Decreased R stance;Decreased foot clearance; Improper Weight shift  (decreased right foot clearance)   Gait Assistance 3  Moderate assist   Additional items Assist x 2;Verbal cues   Assistive Device Rolling walker   Distance 3 side steps at EOB; 8 feet x 1 trial   Balance   Static Sitting Fair +   Dynamic Sitting Fair   Static Standing Poor   Dynamic Standing Poor -   Ambulatory Poor -   Endurance Deficit   Endurance Deficit Yes   Endurance Deficit Description decreased activity tolerance   Activity Tolerance   Activity Tolerance Patient tolerated treatment well;Patient limited by fatigue   Medical Staff Made Aware OT Sayda Strong  (Co-evaluation performed with OT secondary to complex medical condition of patient and regression of functional status from baseline. PT/OT goals were addressed separately.)   Assessment   Prognosis Good   Problem List Decreased strength;Decreased endurance; Impaired balance;Decreased mobility; Decreased cognition; Impaired vision; Impaired sensation   Assessment Pt is 70year old female seen for PT evaluation s/p admit to [id number] on 8/21/2023 with Aphasia as late effect of cerebrovascular accident (CVA). PT consulted to assess pt's functional mobility and discharge needs. Order placed for PT evaluation and treatment, with up and out of bed as tolerated order. Comorbidities affecting pt's physical performance at time of assessment include hypertension, obesity, type 2 DM, and stage 3b CKD. Prior to hospitalization, pt was independent with all functional mobility with a cane. Pt has recently been requiring increased assistance. Pt ambulates household and community distances. Pt resides with her son, in a one level house, with no steps to enter. Personal factors affecting pt at time of initial evaluation include ambulating with an assistive device, inability to ambulate household distances, inability to ambulate community distances, inability to navigate level surfaces without external assistance, unable to perform dynamic tasks in the community, inability to live alone, difficulty performing ADLs, inability to perform IADLs, and visual impairments. Please find objective findings from PT assessment regarding body systems outlined above with impairments and limitations including weakness, impaired balance, decreased endurance, gait deviations, decreased activity tolerance, decreased functional mobility tolerance, altered sensation, fall risk, and decreased cognition. The following objective measures were performed on initial evaluation Barthel Index: 35/100, Modified [place]: 4 (moderate/severe disability) and -PAC 6-Clicks: 47/48. Pt's clinical presentation is currently unstable/unpredictable seen in pt's presentation of need for ongoing medical management/monitoring, pt is a fall risk, pt requires use of RW for safe ambulation, and pt requires cues and assist for safety with functional mobility.  Pt to benefit from continued PT treatment to address deficits as defined above and maximize pt's level of function and independence with mobility. From a PT standpoint, recommendation at time of discharge would be STR pending pt's progress in order to facilitate return to prior level of function. Barriers to Discharge Inaccessible home environment;Decreased caregiver support   Goals   Patient Goals "get stronger."   [facility] Expiration Date 09/01/23   Short Term Goal #1 In 10 days: Increase bilateral LE strength 1/2 grade to facilitate independent mobility, Perform all bed mobility tasks modified independent to decrease caregiver burden, Perform all transfers modified independent to improve independence, Ambulate > 100 ft. with RW with CG assist w/o LOB and w/ normalized gait pattern 100% of the time, Navigate 4 stairs with mod A of 1 with unilateral handrail to facilitate return to previous living environment and Increase all balance 1/2 grade to decrease risk for falls   PT Treatment Day 1   Plan   Treatment/Interventions Functional transfer training;LE strengthening/ROM; Elevations; Therapeutic exercise; Endurance training;Cognitive reorientation;Patient/family training;Bed mobility;Gait training;Spoke to nursing;OT   PT Frequency 3-5x/wk   Recommendation   PT Discharge Recommendation Post acute rehabilitation services   AM-PAC Basic Mobility Inpatient   Turning in Flat Bed Without Bedrails 3   Lying on Back to Sitting on Edge of Flat Bed Without Bedrails 3   Moving Bed to Chair 1   Standing Up From Chair Using Arms 2   Walk in Room 1   Climb 3-5 Stairs With Railing 1   Basic Mobility Inpatient Raw Score 11   Basic Mobility Standardized Score 30.25   Highest Level Of Mobility   -[facility] Goal 4: Move to chair/commode   JH-HLM Achieved 6: Walk 10 steps or more   Modified Hendricks Scale   Modified Maggy Scale 4   Barthel Index   Feeding 5   Bathing 0   Grooming Score 0   Dressing Score 5   Bladder Score 5   Bowels Score 10   Toilet Use Score 5   Transfers (Bed/Chair) Score 5   Mobility (Level Surface) Score 0   Stairs Score 0   Barthel Index Score 35   Additional Treatment Session   Start Time 0801   End Time 4225   Treatment Assessment Pt agreeable to PT treatment session following PT evaluation. Pt performed seated and supine therapeutic exercise as indicated below. Pt required verbal cues and minimal tactile cues for correct technique and form. Pt tolerated therapeutic exercise well without complaints of pain. Pt continues to exhibit decreased lower extremity strength, impaired balance, decreased endurance, gait deviations, and decreased functional mobility. PT to continue to recommend STR. PT to continue to follow and treat as appropriate. Exercises   Hip Flexion Sitting;15 reps;AROM; Bilateral   Hip Abduction Supine;15 reps;AROM; Bilateral   Hip Adduction Sitting;15 reps;AROM; Bilateral   Knee AROM Long Arc Quad Sitting;20 reps;AROM; Bilateral   Ankle Pumps Sitting;20 reps;AAROM; Right;AROM; Left   End of Consult   Patient Position at End of Consult Supine; All needs within reach     PT Evaluation Time: 9727-9719    PT Treatment Time: 5915-2071  10 minutes  Anthony Smith, PT, DPT

## 2023-08-22 NOTE — ED PROVIDER NOTES
History  Chief Complaint   Patient presents with   • Weakness - Generalized     Patient recently d/c patient was here for stroke rule out and the senastions/problems she was having were residual form the stroke she had 3 weeks ago. Patient d/c Saturday was walking and moving. About 24 hours ago she became weaker and was unable to walk. Patient has difficutly with finding words post stroke 3 weeks ago. Patient alert to self and able to follow directions. 53-year-old female presenting to the emergency department for evaluation of generalized weakness. Patient was recently seen here and discharged after having a stroke. She has some aphasia and generalized weakness from this. There was a plan for her to be discharged to get a vascular procedure and then from there to go to rehab afterwards, however because of her poorly controlled diabetes the vascular procedure had to be canceled, she has had increasing generalized fatigue and difficulty performing ADLs at home and was recommended to come back to the hospital for placement to rehab. She has no fevers or chills. She has no chest pain palpitations or shortness of breath. She has no abdominal pain nausea or vomiting. She has no changes in bowel or bladder habits. Prior to Admission Medications   Prescriptions Last Dose Informant Patient Reported? Taking? Alcohol Swabs 70 % PADS  Self, Child No No   Sig: May substitute brand based on insurance coverage. Check glucose ACHS. Blood Glucose Monitoring Suppl (OneTouch Verio Reflect) w/Device KIT  Self, Child No No   Sig: May substitute brand based on insurance coverage. Check glucose ACHS. Omega-3 Fatty Acids (fish oil) 1,000 mg  Self, Child No No   Sig: Take 1 capsule (1,000 mg total) by mouth daily Do not start before July 30, 2023. OneTouch Delica Lancets 64C MISC  Self, Child No No   Sig: May substitute brand based on insurance coverage. Check glucose ACHS.    allopurinol (ZYLOPRIM) 100 mg tablet  Self, Child No No   Sig: TAKE 1 TABLET BY MOUTH EVERY DAY   amLODIPine (NORVASC) 10 mg tablet  Self, Child No No   Sig: Take 1 tablet (10 mg total) by mouth daily   aspirin 81 mg chewable tablet   No No   Sig: Chew 1 tablet (81 mg total) daily   cinacalcet (SENSIPAR) 30 mg tablet   No No   Sig: Take 1 tablet (30 mg total) by mouth every other day   cloNIDine (CATAPRES-TTS-3) 0.3 mg/24 hr  Self, Child No No   Sig: PLACE 1 PATCH (0.3 MG TOTAL) ON THE SKIN ONCE A WEEK   glipiZIDE (GLUCOTROL) 5 mg tablet   No No   Sig: Take 1 tablet (5 mg total) by mouth daily with breakfast   glucose blood (OneTouch Verio) test strip  Self, Child No No   Sig: May substitute brand based on insurance coverage. Check glucose ACHS.    labetalol (NORMODYNE) 300 mg tablet  Self, Child No No   Sig: Take 1 tablet (300 mg total) by mouth 2 (two) times a day   losartan (COZAAR) 100 MG tablet  Self, Child No No   Sig: Take 1 tablet (100 mg total) by mouth daily   pravastatin (PRAVACHOL) 80 mg tablet   No No   Sig: Take 1 tablet (80 mg total) by mouth daily with dinner   ticagrelor (BRILINTA) 90 MG   No No   Sig: Take 1 tablet (90 mg total) by mouth every 12 (twelve) hours   traZODone (DESYREL) 50 mg tablet   No No   Sig: Take 1 tablet (50 mg total) by mouth daily at bedtime   zinc oxide 20 % ointment  Self, Child No No   Sig: Apply topically as needed for irritation      Facility-Administered Medications: None       Past Medical History:   Diagnosis Date   • Arthritis    • Chronic kidney disease    • Diabetes mellitus ([address])    • Endometriosis    • High cholesterol    • Hypertension    • Kidney disease, chronic, stage III (moderate, EGFR 30-59 ml/min) ([facility])    • Lumbar disc herniation    • Neuropathy    • Peripheral vascular disease ([facility])    • Pneumonia    • Shoulder injury     left   • Spinal stenosis    • Stroke ([address]) 2015    Memory loss       Past Surgical History:   Procedure Laterality Date   • CARDIAC CATHETERIZATION     • COLONOSCOPY • FL RETROGRADE PYELOGRAM  4/6/2020   • FRACTURE SURGERY Right     ankle   • OVARIAN CYST SURGERY     • AZ CYSTO BLADDER W/URETERAL CATHETERIZATION Bilateral 4/6/2020    Procedure: CYSTOSCOPY WITH RETROGRADE PYELOGRAM;  Surgeon: Ciara Aquino MD;  [facility];  Service: Urology   • AZ CYSTO W/INSERT URETERAL STENT Right 4/6/2020    Procedure: INSERTION STENT URETERAL;  Surgeon: Ciara Aquino MD;  [facility];  Service: Urology   • AZ CYSTO W/REMOVAL OF TUMORS SMALL N/A 4/6/2020    Procedure: TRANSURETHRAL RESECTION OF BLADDER TUMOR (TURBT); Surgeon: Ciara Aquino MD;  [facility];  Service: Urology   • ROTATOR CUFF REPAIR Left    • TONSILLECTOMY         Family History   Problem Relation Age of Onset   • Hypertension Mother    • Heart disease Mother         Valvular   • Hyperlipidemia Mother    • Hypertension Father    • Heart defect Father         Cardiomegaly   • Stroke Sister         Cerebrovascular Accident   • Arthritis Brother    • Other Brother         Back Disorder     I have reviewed and agree with the history as documented. E-Cigarette/Vaping   • E-Cigarette Use Never User      E-Cigarette/Vaping Substances   • Nicotine No    • THC No    • CBD No    • Flavoring No    • Other No    • Unknown No      Social History     Tobacco Use   • Smoking status: Former     Packs/day: 2.00     Years: 40.00     Total pack years: 80.00     Types: Cigarettes     Start date: 1966     Quit date: 7/27/2020     Years since quitting: 3.0   • Smokeless tobacco: Never   Vaping Use   • Vaping Use: Never used   Substance Use Topics   • Alcohol use: Never   • Drug use: Never       Review of Systems   Constitutional: Positive for fatigue. Negative for appetite change, chills and fever. HENT: Negative for sneezing and sore throat. Eyes: Negative for visual disturbance. Respiratory: Negative for cough, choking, chest tightness, shortness of breath and wheezing.     Cardiovascular: Negative for chest pain and palpitations. Gastrointestinal: Negative for abdominal pain, constipation, diarrhea, nausea and vomiting. Genitourinary: Negative for difficulty urinating and dysuria. Neurological: Positive for weakness. Negative for dizziness, light-headedness, numbness and headaches. All other systems reviewed and are negative. Physical Exam  Physical Exam  Vitals and nursing note reviewed. Constitutional:       General: She is not in acute distress. Appearance: She is well-developed. She is not diaphoretic. HENT:      Head: Normocephalic and atraumatic. Eyes:      Pupils: Pupils are equal, round, and reactive to light. Neck:      Vascular: No JVD. Trachea: No tracheal deviation. Cardiovascular:      Rate and Rhythm: Normal rate and regular rhythm. Heart sounds: Normal heart sounds. No murmur heard. No friction rub. No gallop. Pulmonary:      Effort: Pulmonary effort is normal. No respiratory distress. Breath sounds: Normal breath sounds. No wheezing or rales. Abdominal:      General: Bowel sounds are normal. There is no distension. Palpations: Abdomen is soft. Tenderness: There is no abdominal tenderness. There is no guarding or rebound. Skin:     General: Skin is warm and dry. Coloration: Skin is not pale. Neurological:      Mental Status: She is alert and oriented to person, place, and time. Cranial Nerves: Dysarthria present. No cranial nerve deficit. Motor: No abnormal muscle tone. Comments: Patient has some dysarthria and aphasia which is consistent from recent stroke.       Psychiatric:         Behavior: Behavior normal.         Vital Signs  ED Triage Vitals [08/21/23 2001]   Temperature Pulse Respirations Blood Pressure SpO2   97.6 °F (36.4 °C) 86 18 147/67 98 %      Temp Source Heart Rate Source Patient Position - Orthostatic VS BP Location FiO2 (%)   Tympanic Monitor Sitting Left arm --      Pain Score       -- Vitals:    08/21/23 2001 08/21/23 2230 08/21/23 2300   BP: 147/67 151/67 155/69   Pulse: 86 68 68   Patient Position - Orthostatic VS: Sitting Sitting Sitting         Visual Acuity      ED Medications  Medications   allopurinol (ZYLOPRIM) tablet 100 mg (has no administration in time range)   amLODIPine (NORVASC) tablet 10 mg (has no administration in time range)   aspirin chewable tablet 81 mg (has no administration in time range)   cinacalcet (SENSIPAR) tablet 30 mg (has no administration in time range)   cloNIDine (CATAPRES-TTS-3) 0.3 mg/24 hr TD weekly patch (has no administration in time range)   labetalol (NORMODYNE) tablet 300 mg (has no administration in time range)   pravastatin (PRAVACHOL) tablet 80 mg (has no administration in time range)   ticagrelor (BRILINTA) tablet 90 mg (90 mg Oral Given 8/22/23 0053)   traZODone (DESYREL) tablet 50 mg (50 mg Oral Given 8/22/23 0053)   heparin (porcine) subcutaneous injection 5,000 Units (has no administration in time range)   insulin lispro (HumaLOG) 100 units/mL subcutaneous injection 1-6 Units (has no administration in time range)   sodium chloride 0.9 % bolus 1,000 mL (1,000 mL Intravenous New Bag 8/21/23 2237)       Diagnostic Studies  Results Reviewed     Procedure Component Value Units Date/Time    Comprehensive metabolic panel [[id number]]  (Abnormal) Collected: 08/21/23 2140    Lab Status: Final result Specimen: Blood from Arm, Right Updated: 08/21/23 2210     Sodium 136 mmol/L      Potassium 4.3 mmol/L      Chloride 104 mmol/L      CO2 22 mmol/L      ANION GAP 10 mmol/L      BUN 28 mg/dL      Creatinine 1.91 mg/dL      Glucose 158 mg/dL      Calcium 10.1 mg/dL      AST 24 U/L      ALT 27 U/L      Alkaline Phosphatase 100 U/L      Total Protein 7.8 g/dL      Albumin 4.5 g/dL      Total Bilirubin 0.72 mg/dL      eGFR 25 ml/min/1.73sq m     Narrative:      [facility] (CKD):   •  Stage 1 with normal or high GFR (GFR > 90 mL/min/1.73 square meters)  •  Stage 2 Mild CKD (GFR = 60-89 mL/min/1.73 square meters)  •  Stage 3A Moderate CKD (GFR = 45-59 mL/min/1.73 square meters)  •  Stage 3B Moderate CKD (GFR = 30-44 mL/min/1.73 square meters)  •  Stage 4 Severe CKD (GFR = 15-29 mL/min/1.73 square meters)  •  Stage 5 End Stage CKD (GFR <15 mL/min/1.73 square meters)  Note: GFR calculation is accurate only with a steady state creatinine    CBC and differential [[id number]] Collected: 08/21/23 2140    Lab Status: Final result Specimen: Blood from Arm, Right Updated: 08/21/23 2150     WBC 8.43 Thousand/uL      RBC 4.48 Million/uL      Hemoglobin 13.9 g/dL      Hematocrit 39.6 %      MCV 88 fL      MCH 31.0 pg      MCHC 35.1 g/dL      RDW 14.6 %      MPV 10.2 fL      Platelets 307 Thousands/uL      nRBC 0 /100 WBCs      Neutrophils Relative 64 %      Immat GRANS % 1 %      Lymphocytes Relative 21 %      Monocytes Relative 9 %      Eosinophils Relative 4 %      Basophils Relative 1 %      Neutrophils Absolute 5.40 Thousands/µL      Immature Grans Absolute 0.09 Thousand/uL      Lymphocytes Absolute 1.76 Thousands/µL      Monocytes Absolute 0.79 Thousand/µL      Eosinophils Absolute 0.32 Thousand/µL      Basophils Absolute 0.07 Thousands/µL     UA w Reflex to Microscopic w Reflex to Culture [[id number]]     Lab Status: No result Specimen: Urine                  No orders to display              Procedures  Procedures         ED Course               Identification of Seniors at Risk    Flowsheet Row Most Recent Value   (ISAR) Identification of Seniors at Risk    Before the illness or injury that brought you to the Emergency, did you need someone to help you on a regular basis? 1 Filed at: 08/21/2023 2006   In the last 24 hours, have you needed more help than usual? 1 Filed at: 08/21/2023 2006   Have you been hospitalized for one or more nights during the past 6 months?  1 Filed at: 08/21/2023 2006   In general, do you see well? 0 Filed at: 08/21/2023 2006   In general, do you have serious problems with your memory? 0 Filed at: 08/21/2023 2006   Do you take more than three different medications every day? 1 Filed at: 08/21/2023 2006   ISAR Score 4 Filed at: 08/21/2023 2006                      SBIRT 20yo+    Flowsheet Row Most Recent Value   Initial Alcohol Screen: US AUDIT-C     1. How often do you have a drink containing alcohol? 1 Filed at: 08/21/2023 2006   2. How many drinks containing alcohol do you have on a typical day you are drinking? 1 Filed at: 08/21/2023 2006   3b. FEMALE Any Age, or MALE 65+: How often do you have 4 or more drinks on one occassion? 0 Filed at: 08/21/2023 2006   Audit-C Score 2 Filed at: 08/21/2023 2006   DALE: How many times in the past year have you. .. Used an illegal drug or used a prescription medication for non-medical reasons? Never Filed at: 08/21/2023 2006                    Medical Decision Making  22-year-old female with worsening fatigue and weakness difficulty performing ADLs, would likely benefit from rehab as discussed on recent admission, will check some labs here and discuss admission for placement in rehab with medicine. Amount and/or Complexity of Data Reviewed  Labs: ordered. Risk  Decision regarding hospitalization. Disposition  Final diagnoses:   Weakness     Time reflects when diagnosis was documented in both MDM as applicable and the Disposition within this note     Time User Action Codes Description Comment    8/21/2023 11:50 PM Alex Younger Add [R53.1] Weakness     8/22/2023 12:12 AM Aubrey Arthur Add [I69.320] Aphasia as late effect of cerebrovascular accident (CVA)       ED Disposition     ED Disposition   Admit    Condition   Stable    Date/Time   Mon Aug 21, 2023 11:50 PM    Comment   Case was discussed with NICOLLE and the patient's admission status was agreed to be Admission Status: observation status to the service of Dr. Juni Hahn.            Follow-up Information    None         Patient's Medications   Discharge Prescriptions    No medications on file       No discharge procedures on file.     PDMP Review       Value Time User    PDMP Reviewed  Yes 4/6/2020  8:10 AM Nahum Arizmendi MD          ED Provider  Electronically Signed by           Philip Perez MD  08/22/23 2558

## 2023-08-22 NOTE — ASSESSMENT & PLAN NOTE
Lab Results   Component Value Date    EGFR 27 08/22/2023    EGFR 25 08/21/2023    EGFR 31 08/18/2023    CREATININE 1.79 (H) 08/22/2023    CREATININE 1.91 (H) 08/21/2023    CREATININE 1.60 (H) 08/18/2023     · Improved  · Baseline around 1.6-1.8  · Avoid nephrotoxic agents  · Holding home losartan  · On gentle IV hydration  · AM BMP

## 2023-08-22 NOTE — ASSESSMENT & PLAN NOTE
Lab Results   Component Value Date    HGBA1C 8.5 (H) 08/16/2023     · 153 this AM, acceptable control  · Hold home oral antihyperglycemics   · Correctional SSI  · Hypoglycemia protocol  · Diabetic diet

## 2023-08-22 NOTE — PLAN OF CARE
Problem: OCCUPATIONAL THERAPY ADULT  Goal: Performs self-care activities at highest level of function for planned discharge setting. See evaluation for individualized goals. Description: Treatment Interventions: ADL retraining, Functional transfer training, UE strengthening/ROM, Endurance training, Cognitive reorientation, Patient/family training, Fine motor coordination activities, Compensatory technique education, Continued evaluation, Energy conservation, Activityengagement          See flowsheet documentation for full assessment, interventions and recommendations. Note: Limitation: Decreased ADL status, Decreased UE ROM, Decreased UE strength, Decreased endurance, Decreased self-care trans, Decreased high-level ADLs  Prognosis: Good  Assessment: Patient is a 70 y.o. female seen for OT evaluation s/p admit to [facility] w/Aphasia as late effect of cerebrovascular accident (CVA). Commorbidities affecting patient's functional performance at time of assessment include: CKD, Type 2 DM, Obesity, HTN, h/o of recent CVA with aphasia. Orders placed for OT evaluation and treatment. Performed at least two patient identifiers during session including name and wristband. Prior to admission, Patient lives with her Son and girlfriend in a one story house, with ramped entrance. patient ambulates with a walker or cane, uses the W/C intermittently for locomotion. Personal factors affecting patient at time of initial evaluation include: limited caregiver support, limited insight into deficits, decreased initiation and engagement, difficulty performing ADLs and difficulty performing IADLs. Upon evaluation, patient requires minimal  assist for UB ADLs, maximal assist for LB ADLs.   Occupational performance is affected by the following deficits: decreased functional use of BUEs, limited active ROM, decreased muscle strength, degenerative arthritic joint changes, impaired gross motor coordination, dynamic sit/ stand balance deficit with poor standing tolerance time for self care and functional mobility, decreased activity tolerance, impaired depth perception, decreased safety awareness, increased pain and postural control and postural alignment deficit, requiring external assistance to complete transitional movements. Patient to benefit from continued Occupational Therapy treatment while in the hospital to address deficits as defined above and maximize level of functional independence with ADLs and functional mobility. Occupational Performance areas to address include: bathing/ shower, dressing, toilet hygiene, transfer to all surfaces, functional mobility, emergency response, IADLs: safety procedures and Leisure Participation. From OT standpoint, recommendation at time of d/c would be Short Term Rehab.      OT Discharge Recommendation: Post acute rehabilitation services

## 2023-08-22 NOTE — CASE MANAGEMENT
Case Management Assessment & Discharge Planning Note    Patient name Toy Mearhona  Location FT /FT  MRN [id number]  : 1945 Date 2023       Current Admission Date: 2023  Current Admission Dana Lugo as late effect of cerebrovascular accident (CVA)   Patient Active Problem List    Diagnosis Date Noted   • Aphasia as late effect of cerebrovascular accident (CVA) 2023   • Dysarthria 08/15/2023   • Stage 3b chronic kidney disease ([address])    • Hypertensive urgency 2023   • Aortoiliac occlusive disease ([address]) 10/11/2022   • History of gastrointestinal stromal tumor (GIST) 2022   • Secondary hyperparathyroidism of renal origin ([address]) 2022   • 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   • Diabetes ([address]) 2021   • Type 2 diabetes mellitus with diabetic peripheral angiopathy without gangrene ([address]) 2021   • Embolism and 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 ([address]) 2021   • Depression, recurrent ([address]) 2021   • Obesity, morbid ([address]) 2021   • Stage 4 chronic kidney disease ([address]) 2020   • Hypertensive kidney disease with stage 4 chronic kidney disease ([address]) 2020   • Cervical radiculopathy 2020   • Malignant neoplasm of overlapping sites of bladder ([address]) 2020   • Stenosis of left anterior descending artery Mid LAD 50-60% stenosis 2020   • 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  2020   • Mesenteric artery stenosis (HCC) 2020   • Positive cardiac stress test small, mildly severe, partially reversible myocardial perfusion defect of anterior and inferior wall  11/12/2019   • Abnormal CT of the chest suspicious for an infectious or inflammatory bronchiolitis. 11/07/2019   • Celiac artery stenosis (HCC) >70% stenosis in the celiac trunk 11/05/2019   • Median arcuate ligament syndrome ([address]) 11/05/2019   • Atherosclerosis of renal artery ([address]) 07/01/2019   • Aortoiliac stenosis, left (HCC) 02/25/2019   • Spondylosis of lumbar region without myelopathy or radiculopathy 01/29/2019   • Lumbosacral spondylosis without myelopathy 01/29/2019   • Statin intolerance 01/22/2019   • Lumbar disc herniation 01/22/2019   • Herniated lumbar intervertebral disc 01/22/2019   • Chronic GERD 12/04/2017   • Stage 3 chronic kidney disease ([address]) 12/04/2017   • Claudication in peripheral vascular disease ([address]) 12/04/2017   • Gout 12/04/2017   • Hx-TIA (transient ischemic attack) 12/04/2017   • Hyperlipidemia associated with type 2 diabetes mellitus ([address]) 12/04/2017   • Hypertension associated with diabetes ([address]) 12/04/2017   • Osteoarthritis 12/04/2017   • Obesity (BMI 30-39.9) 12/04/2017   • Right renal artery stenosis ([address]) 12/04/2017   • 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   • Hyperlipidemia, unspecified 12/04/2017   • Vitamin D deficiency 09/08/2016   • Basilar artery stenosis 06/22/2016   • Type 2 diabetes mellitus with diabetic nephropathy ([address]) 12/19/2015   • Hypercholesteremia 12/19/2015   • Hypertension 12/19/2015   • Polyneuropathy 12/19/2015   • CVA (cerebral vascular accident) ([address]) 11/09/2015      LOS (days): 0  Geometric Mean LOS (GMLOS) (days):   Days to GMLOS:     OBJECTIVE:      Current admission status: Observation  Referral Reason: Acute Rehab    Preferred Pharmacy:    [address] - [address] [id number]  [address] [address]  Phone: [phone] Fax: [phone]    [facility]/pharmacy #[id number]- [facility] [address] - [address] [facility] [address]  Phone: [phone] Fax: [phone]    Primary Care Provider: Gideon Jose MD    Primary Insurance: [facility]  Secondary Insurance:     ASSESSMENT:  Daniel Proxies    There are no active Health Care Proxies on file. Advance Directives  Does patient have a [address]?: No  Was patient offered paperwork?: Yes (Provided to patient last admission)  Does patient currently have a Health Care decision maker?: No  Does patient have Advance Directives?: No  Was patient offered paperwork?: Yes (Provided to patient last admission)  Primary Contact: inder mcfarland (Son)   [phone] (Mobile)    Readmission Root Cause  30 Day Readmission:  (ONLY an OBS)    Patient Information  Admitted from[de-identified] Home  Mental Status: Alert  During Assessment patient was accompanied by: Not accompanied during assessment  Assessment information provided by[de-identified] Patient, Son  Primary Caregiver: Self  Support Systems: Self, Son, Family members  [place] of Residence: [address] [place] [address] do you live in?: [address] [place] [address] Road entry access options.  Select all that apply.: Ramp  Type of Current Residence: [place]  In the last 12 months, was there a time when you were not able to pay the mortgage or rent on time?: No  In the last 12 months, how many places have you lived?: 1  In the last 12 months, was there a time when you did not have a steady place to sleep or slept in a shelter (including now)?: No  Homeless/housing insecurity resource given?: N/A  Living Arrangements: Lives w/ Son  Is patient a ?: No    Activities of Daily Living Prior to Admission  Functional Status: Assistance  Completes ADLs independently?: No  Level of ADL dependence: Assistance  Ambulates independently?: Yes  Does patient use assisted devices?: Yes  Assisted Devices (DME) used: Good Low wheelchair  Does patient currently own DME?: Yes  What DME does the patient currently own?: Bedside Commode, Straight Cane, Walker, Shower Chair, Electric Wheelchair, Other (Comment) (Shower chair, glucometer, Electric bed)  Does patient have a history of Outpatient Therapy (PT/OT)?: No  Does the patient have a history of Short-Term Rehab?: No  Does patient have a history of HHC?: Yes Elfida Ranks)  Does patient currently have [address]?: Yes    Current Home Health Care  Type of Current Home Care Services: Home PT, Home OT, Home ST, Nurse visit  [facility]de[facility]identified[facility] [place]de-identified] PCP    Patient Information Continued  Income Source: SSI/SSD  Does patient have prescription coverage?: Yes  Within the past 12 months, you worried that your food would run out before you got the money to buy more.: Never true  Within the past 12 months, the food you bought just didn't last and you didn't have money to get more.: Never true  Food insecurity resource given?: N/A  Does patient receive dialysis treatments?: No  Does patient have a history of substance abuse?: No  Does patient have a history of Mental Health Diagnosis?: No    Means of Transportation  Means of Transport to Appts[de-identified] Family transport  In the past 12 months, has lack of transportation kept you from medical appointments or from getting medications?: No  In the past 12 months, has lack of transportation kept you from meetings, work, or from getting things needed for daily living?: No    DISCHARGE DETAILS:    Discharge planning discussed with[de-identified] Patient and son Varun Summers of Choice: Yes  Comments - Freedom of Choice: Discussed therapy evals and recs for rehab. Both patient and son would like [facility]. Agreeable to STR referrals as back up plan .   Referral made via Aidin  CM contacted family/caregiver?: Yes  Were Treatment Team discharge recommendations reviewed with patient/caregiver?: Yes  Did patient/caregiver verbalize understanding of patient care needs?: Yes  Were patient/caregiver advised of the risks associated with not following Treatment Team discharge recommendations?: Yes    Contacts  Patient Contacts: inder mcfarland (Son)   [phone] (Mobile)  Relationship to Patient[de-identified] Family  Contact Method: Phone  Phone Number: inder mcfarland (Son)   [phone] (Mobile)  Reason/Outcome: Continuity of Care, Discharge [address]         Is the patient interested in [facility] at discharge?: No    Other Referral/Resources/Interventions Provided:  Interventions: Acute Rehab     Additional Comments: Message from Roshni Mandujano that patient is cleared for discharge and needs rehab. Met with patient at bedside in ED to discuss discharge planning. Patient was just [facility] 8/19 to home. Last admission rehab was recommended. Patient decided on home since she was scheduled today 8/22 for a TCAR)  [facility] Rev[facility] at [facility]. Discussed with patient need for rehab and would like [facility]. Call to areli Lambert and discussed same. Per Mily Lambert patient did well at home until yesterday and then could not get up off sofa. Mily Lambert would like [facility] however agreeable to STR as a secondary plan. Refrrals made via Aidin to [facility] also made STR referrals as back up plan. Call recived from Jessica Avila [phone] at [place]. Shamar Alfonso asking why auth for rehab was cancelled updated on same and that patient is back as an OBS 8/21. Per Shamar Alfonso will need to submit for new rehab auth.   CM department following thru discharge

## 2023-08-22 NOTE — ASSESSMENT & PLAN NOTE
· BP (!) 179/72   Pulse 75   Temp 97.6 °F (36.4 °C) (Tympanic)   Resp 13   Ht 4' 10" (1.473 m)   Wt 74.8 kg (165 lb)   LMP  (LMP Unknown)   SpO2 95%   BMI 34.49 kg/m²   · Uncontrolled currently]  · Cont current meds

## 2023-08-22 NOTE — PLAN OF CARE
Problem: PHYSICAL THERAPY ADULT  Goal: Performs mobility at highest level of function for planned discharge setting. See evaluation for individualized goals. Description: Treatment/Interventions: Functional transfer training, LE strengthening/ROM, Elevations, Therapeutic exercise, Endurance training, Cognitive reorientation, Patient/family training, Bed mobility, Gait training, Spoke to nursing, OT          See flowsheet documentation for full assessment, interventions and recommendations. Note: Prognosis: Good  Problem List: Decreased strength, Decreased endurance, Impaired balance, Decreased mobility, Decreased cognition, Impaired vision, Impaired sensation  Assessment: Pt is 70year old female seen for PT evaluation s/p admit to [id number] [place] [address] on 8/21/2023 with Aphasia as late effect of cerebrovascular accident (CVA). PT consulted to assess pt's functional mobility and discharge needs. Order placed for PT evaluation and treatment, with up and out of bed as tolerated order. Comorbidities affecting pt's physical performance at time of assessment include hypertension, obesity, type 2 DM, and stage 3b CKD. Prior to hospitalization, pt was independent with all functional mobility with a cane. Pt has recently been requiring increased assistance. Pt ambulates household and community distances. Pt resides with her son, in a one level house, with no steps to enter. Personal factors affecting pt at time of initial evaluation include ambulating with an assistive device, inability to ambulate household distances, inability to ambulate community distances, inability to navigate level surfaces without external assistance, unable to perform dynamic tasks in the community, inability to live alone, difficulty performing ADLs, inability to perform IADLs, and visual impairments.  Please find objective findings from PT assessment regarding body systems outlined above with impairments and limitations including weakness, impaired balance, decreased endurance, gait deviations, decreased activity tolerance, decreased functional mobility tolerance, altered sensation, fall risk, and decreased cognition. The following objective measures were performed on initial evaluation Barthel Index: 35/100, Modified [place]: 4 (moderate/severe disability) and AM-PAC 6-Clicks: 78/25. Pt's clinical presentation is currently unstable/unpredictable seen in pt's presentation of need for ongoing medical management/monitoring, pt is a fall risk, pt requires use of RW for safe ambulation, and pt requires cues and assist for safety with functional mobility. Pt to benefit from continued PT treatment to address deficits as defined above and maximize pt's level of function and independence with mobility. From a PT standpoint, recommendation at time of discharge would be STR pending pt's progress in order to facilitate return to prior level of function. Barriers to Discharge: Inaccessible home environment, Decreased caregiver support     PT Discharge Recommendation: Post acute rehabilitation services    See flowsheet documentation for full assessment.

## 2023-08-22 NOTE — H&P
[address]  H&P  Name: Ev Washington 70 y.o. female I MRN: [id number]  Unit/Bed#: ED 22 I Date of Admission: 8/21/2023   Date of Service: 8/22/2023 I Hospital Day: 0      Assessment/Plan   * Aphasia as late effect of cerebrovascular accident (CVA)  Assessment & Plan  Patient with recent discharge on 08/19 following a stay for acute CVA and resulting dysarthria/aphasia. She was discharged home with plan for outpatient vascular follow-up. However patient has had increasing difficulty caring for herself, and performing ADLs. This prompted repeat ED visit, with patient and family requesting outpatient rehab placement. Denies other acute symptom/complaint at this time.     /69 (BP Location: Left arm)   Pulse 68   Temp 97.6 °F (36.4 °C) (Tympanic)   Resp 22   Ht 4' 10" (1.473 m)   Wt 74.8 kg (165 lb)   LMP  (LMP Unknown)   SpO2 98%   BMI 34.49 kg/m²     · Recently discharged 08/19 following stay for acute CVA and resulting dysarthria/aphasia w/plan for outpatient vascular follow up  · Aphasia symptoms have persisted after discharge   · Patient with increasing difficulty caring for self and performing ADL's at home  · Presenting today with request for outpatient rehab placement   · Continue home DAPT and statin  · Case management consulted for placement assistance    Chronic kidney disease  Assessment & Plan  Lab Results   Component Value Date    EGFR 25 08/21/2023    EGFR 31 08/18/2023    EGFR 29 08/17/2023    CREATININE 1.91 (H) 08/21/2023    CREATININE 1.60 (H) 08/18/2023    CREATININE 1.69 (H) 08/17/2023     · Creatinine 1.91; baseline around 1.6   · Avoid nephrotoxic agents  · Holding home losartan  · Gentle IVF hydration overnight  · AM BMP     Type 2 diabetes mellitus with diabetic peripheral angiopathy without gangrene [facility])  Assessment & Plan    Lab Results   Component Value Date    HGBA1C 8.5 (H) 08/16/2023     · Hold home oral antihyperglycemics   · Correctional SSI  · Hypoglycemia protocol  · Diabetic diet     Obesity, morbid (HCC)  Assessment & Plan  · Recommend weight loss via healthy diet and exercise     Hypertension  Assessment & Plan  · /69  · Continue home amlodipine, clonidine, and labetalol  · Holding home losartan in setting of elevated creatinine  · Monitor BP per unit protocol     VTE Pharmacologic Prophylaxis: VTE Score: 9 High Risk (Score >/= 5) - Pharmacological DVT Prophylaxis Ordered: heparin. Sequential Compression Devices Ordered. Code Status: Level 1 - Full Code   Discussion with family: update in Am. Anticipated Length of Stay: Patient will be admitted on an observation basis with an anticipated length of stay of less than 2 midnights secondary to recent stroke and failure to thrive . Chief Complaint: difficulty performing ADL's    History of Present Illness:  John Swain is a 70 y.o. female with a PMH of CVA, T2DM, CKD, HTN, and HLD who presents with difficulty performing ADL's. Patient with recent discharge on 08/19 following a stay for acute CVA and resulting dysarthria/aphasia. She was discharged home with plan for outpatient vascular follow-up. However patient has had increasing difficulty caring for herself, and performing ADLs. This prompted repeat ED visit, with patient and family requesting outpatient rehab placement. Denies other acute symptom/complaint at this time. All questions answered at the bedside to the best of my ability. Review of Systems:  Review of Systems   Constitutional: Positive for activity change (decreased ) and fatigue. HENT: Negative for congestion, nosebleeds and trouble swallowing. Eyes: Negative for visual disturbance. Respiratory: Negative for cough, shortness of breath and wheezing. Cardiovascular: Negative for chest pain and leg swelling. Gastrointestinal: Negative for abdominal distention, abdominal pain, blood in stool, diarrhea, nausea and vomiting.    Genitourinary: Negative for difficulty urinating, dysuria, flank pain, hematuria and urgency. Musculoskeletal: Positive for gait problem. Negative for neck pain and neck stiffness. Skin: Negative for rash and wound. Neurological: Positive for speech difficulty (post-stroke) and weakness (subjective generalized ). Negative for dizziness, light-headedness and numbness. Past Medical and Surgical History:   Past Medical History:   Diagnosis Date   • Arthritis    • Chronic kidney disease    • Diabetes mellitus ([address])    • Endometriosis    • High cholesterol    • Hypertension    • Kidney disease, chronic, stage III (moderate, EGFR 30-59 ml/min) ([facility])    • Lumbar disc herniation    • Neuropathy    • Peripheral vascular disease ([facility])    • Pneumonia    • Shoulder injury     left   • Spinal stenosis    • Stroke ([address]) 2015    Memory loss       Past Surgical History:   Procedure Laterality Date   • CARDIAC CATHETERIZATION     • COLONOSCOPY     • FL RETROGRADE PYELOGRAM  4/6/2020   • FRACTURE SURGERY Right     ankle   • OVARIAN CYST SURGERY     • AZ CYSTO BLADDER W/URETERAL CATHETERIZATION Bilateral 4/6/2020    Procedure: CYSTOSCOPY WITH RETROGRADE PYELOGRAM;  Surgeon: Ciara Aquino MD;  [facility];  Service: Urology   • AZ CYSTO W/INSERT URETERAL STENT Right 4/6/2020    Procedure: INSERTION STENT URETERAL;  Surgeon: Ciara Aquino MD;  [facility];  Service: Urology   • AZ CYSTO W/REMOVAL OF TUMORS SMALL N/A 4/6/2020    Procedure: TRANSURETHRAL RESECTION OF BLADDER TUMOR (TURBT); Surgeon: Ciara Aquino MD;  [facility];  Service: Urology   • ROTATOR CUFF REPAIR Left    • TONSILLECTOMY         Meds/Allergies:  Prior to Admission medications    Medication Sig Start Date End Date Taking? Authorizing Provider   Alcohol Swabs 70 % PADS May substitute brand based on insurance coverage. Check glucose ACHS.  7/29/23   GRACIELA Cain   allopurinol (ZYLOPRIM) 100 mg tablet TAKE 1 TABLET BY MOUTH EVERY DAY 1/23/23   Haley Allen MD   amLODIPine (NORVASC) 10 mg tablet Take 1 tablet (10 mg total) by mouth daily 5/15/23   Hero Tripp MD   aspirin 81 mg chewable tablet Chew 1 tablet (81 mg total) daily 8/19/23 9/18/23  Phuong Flores MD   Blood Glucose Monitoring Suppl (OneTouch Verio Reflect) w/Device KIT May substitute brand based on insurance coverage. Check glucose ACHS. 7/29/23   GRACIELA Craft   cinacalcet (SENSIPAR) 30 mg tablet Take 1 tablet (30 mg total) by mouth every other day 8/7/23 5/3/24  Haley Allen MD   cloNIDine (CATAPRES-TTS-3) 0.3 mg/24 hr PLACE 1 PATCH (0.3 MG TOTAL) ON THE SKIN ONCE A WEEK 5/1/23   Hero Tripp MD   glipiZIDE (GLUCOTROL) 5 mg tablet Take 1 tablet (5 mg total) by mouth daily with breakfast 8/9/23   Hero Tripp MD   glucose blood (OneTouch Verio) test strip May substitute brand based on insurance coverage. Check glucose ACHS. 7/29/23   GRACIELA Craft   labetalol (NORMODYNE) 300 mg tablet Take 1 tablet (300 mg total) by mouth 2 (two) times a day 5/15/23   Hero Tripp MD   losartan (COZAAR) 100 MG tablet Take 1 tablet (100 mg total) by mouth daily 7/17/23   Hero Tripp MD   Omega-3 Fatty Acids (fish oil) 1,000 mg Take 1 capsule (1,000 mg total) by mouth daily Do not start before July 30, 2023. 7/30/23 8/29/23  GRACIELA Craft   OneTouch Delica Lancets 82E MISC May substitute brand based on insurance coverage. Check glucose ACHS.  7/29/23   GRACIELA Craft   pravastatin (PRAVACHOL) 80 mg tablet Take 1 tablet (80 mg total) by mouth daily with dinner 8/19/23   Phuong Flores MD   [facility]) 90 MG Take 1 tablet (90 mg total) by mouth every 12 (twelve) hours 8/19/23 9/18/23  Phuong Flores MD   traZODone (DESYREL) 50 mg tablet Take 1 tablet (50 mg total) by mouth daily at bedtime 8/19/23   Phuong Flores MD   zinc oxide 20 % ointment Apply topically as needed for irritation 6/26/23   Pranay Weston MD     I have reviewed home medications using recent Epic encounter. Allergies: Allergies   Allergen Reactions   • Fenofibrate Other (See Comments)      blood in urine  hx  Kidney Failure   • Colesevelam Other (See Comments)      leg pains   • Colestipol Itching and Other (See Comments)      Swelling lower legs   • Ezetimibe GI Intolerance   • Statins Myalgia       Social History:  Marital Status: /Civil Union   Occupation: N/A  Patient Pre-hospital Living Situation: Home  Substance Use History:   Social History     Substance and Sexual Activity   Alcohol Use Never     Social History     Tobacco Use   Smoking Status Former   • Packs/day: 2.00   • Years: 40.00   • Total pack years: 80.00   • Types: Cigarettes   • Start date: 1966   • Quit date: 7/27/2020   • Years since quitting: 3.0   Smokeless Tobacco Never     Social History     Substance and Sexual Activity   Drug Use Never       Family History:  Family History   Problem Relation Age of Onset   • Hypertension Mother    • Heart disease Mother         Valvular   • Hyperlipidemia Mother    • Hypertension Father    • Heart defect Father         Cardiomegaly   • Stroke Sister         Cerebrovascular Accident   • Arthritis Brother    • Other Brother         Back Disorder       Physical Exam:     Vitals:   Blood Pressure: 155/69 (08/21/23 2300)  Pulse: 68 (08/21/23 2300)  Temperature: 97.6 °F (36.4 °C) (08/21/23 2001)  Temp Source: Tympanic (08/21/23 2001)  Respirations: 22 (08/21/23 2300)  Height: 4' 10" (147.3 cm) (08/21/23 2001)  Weight - Scale: 74.8 kg (165 lb) (08/21/23 2001)  SpO2: 98 % (08/21/23 2300)    Physical Exam  Constitutional:       Appearance: She is obese. She is ill-appearing (chronic ). HENT:      Head: Normocephalic and atraumatic. Mouth/Throat:      Mouth: Mucous membranes are dry. Eyes:      Extraocular Movements: Extraocular movements intact.       Pupils: Pupils are equal, round, and reactive to light. Cardiovascular:      Rate and Rhythm: Normal rate and regular rhythm. Pulses: Normal pulses. Heart sounds: Normal heart sounds. Pulmonary:      Effort: Pulmonary effort is normal. No respiratory distress. Breath sounds: Normal breath sounds. Abdominal:      General: Bowel sounds are normal. There is no distension. Palpations: Abdomen is soft. Tenderness: There is no abdominal tenderness. There is no guarding. Musculoskeletal:      Right lower leg: No edema. Left lower leg: No edema. Skin:     General: Skin is warm and dry. Neurological:      Mental Status: Mental status is at baseline. Additional Data:     Lab Results:  Results from last 7 days   Lab Units 08/21/23  2140   WBC Thousand/uL 8.43   HEMOGLOBIN g/dL 13.9   HEMATOCRIT % 39.6   PLATELETS Thousands/uL 275   NEUTROS PCT % 64   LYMPHS PCT % 21   MONOS PCT % 9   EOS PCT % 4     Results from last 7 days   Lab Units 08/21/23  2140   SODIUM mmol/L 136   POTASSIUM mmol/L 4.3   CHLORIDE mmol/L 104   CO2 mmol/L 22   BUN mg/dL 28*   CREATININE mg/dL 1.91*   ANION GAP mmol/L 10   CALCIUM mg/dL 10.1   ALBUMIN g/dL 4.5   TOTAL BILIRUBIN mg/dL 0.72   ALK PHOS U/L 100   ALT U/L 27   AST U/L 24   GLUCOSE RANDOM mg/dL 158*         Results from last 7 days   Lab Units 08/19/23  1056 08/19/23  0718 08/18/23  1959 08/18/23  1642 08/18/23  1114 08/18/23  0724 08/17/23  2046 08/17/23  1622 08/17/23  1118 08/17/23  0704 08/16/23  2029 08/16/23  1633   POC GLUCOSE mg/dl 254* 196* 137 138 260* 163* 149* 209* 184* 196* 138 258*     Results from last 7 days   Lab Units 08/16/23  0544   HEMOGLOBIN A1C % 8.5*           Lines/Drains:  Invasive Devices     Peripheral Intravenous Line  Duration           Peripheral IV 08/21/23 Right Antecubital <1 day                    Imaging: No pertinent imaging reviewed. No orders to display       EKG and Other Studies Reviewed on Admission:   · EKG: NSR.  HR 73.    ** Please Note: This note has been constructed using a voice recognition system.  **

## 2023-08-22 NOTE — ASSESSMENT & PLAN NOTE
Patient with recent discharge on 08/19 following a stay for acute CVA and resulting dysarthria/aphasia. She was discharged home with plan for outpatient vascular follow-up. However patient has had increasing difficulty caring for herself, and performing ADLs. This prompted repeat ED visit, with patient and family requesting outpatient rehab placement. Denies other acute symptom/complaint at this time.     /69 (BP Location: Left arm)   Pulse 68   Temp 97.6 °F (36.4 °C) (Tympanic)   Resp 22   Ht 4' 10" (1.473 m)   Wt 74.8 kg (165 lb)   LMP  (LMP Unknown)   SpO2 98%   BMI 34.49 kg/m²     · Recently discharged 08/19 following stay for acute CVA and resulting dysarthria/aphasia w/plan for outpatient vascular follow up  · Aphasia symptoms have persisted after discharge   · Patient with increasing difficulty caring for self and performing ADL's at home  · Presenting today with request for outpatient rehab placement   · Continue home DAPT and statin  · Case management consulted for placement assistance

## 2023-08-22 NOTE — UTILIZATION REVIEW
Initial Clinical Review    WAS OBSERVATION 8/21/23 @ 2352 CONVERTED TO INPATIENT ADMISSION 8/22/23 @ 1520 DUE TO CONTINUED STAY REQUIRED TO CARE FOR PATIENT WITH DX: APHASIA AS LATE EFFECT OF CVA. Admission: Date/Time/Statement:   Admission Orders (From admission, onward)     Ordered        08/22/23 1520  Inpatient Admission  Once            08/21/23 2352  Place in Observation  Once                      Orders Placed This Encounter   Procedures   • Inpatient Admission     Standing Status:   Standing     Number of Occurrences:   1     Order Specific Question:   Level of Care     Answer:   Med Surg [16]     Order Specific Question:   Estimated length of stay     Answer:   More than 2 Midnights     Order Specific Question:   Certification     Answer:   I certify that inpatient services are medically necessary for this patient for a duration of greater than two midnights. See H&P and MD Progress Notes for additional information about the patient's course of treatment. ED Arrival Information     Expected   -    Arrival   8/21/2023 19:52    Acuity   Emergent            Means of arrival   Wheelchair    Escorted by   Family Member    Service   Hospitalist    Admission type   Emergency            Arrival complaint   Arm and Leg Numbness            Chief Complaint   Patient presents with   • Weakness - Generalized     Patient recently d/c patient was here for stroke rule out and the senastions/problems she was having were residual form the stroke she had 3 weeks ago. Patient d/c Saturday was walking and moving. About 24 hours ago she became weaker and was unable to walk. Patient has difficutly with finding words post stroke 3 weeks ago. Patient alert to self and able to follow directions. Initial Presentation: 70 y.o. female to ED via [facility] from home  Present to ED with difficulty performing ADL's. Patient with recent discharge on 08/19 following a stay for acute CVA and resulting dysarthria/aphasia.  She was discharged home with plan for outpatient vascular follow-up. However patient has had increasing difficulty caring for herself, and performing ADLs. patient and family requesting inpatient rehab placement  PMHX CVA, T2DM, CKD, HTN, and HLD   Admitted to OBS with DX: Aphasia as late effect of cerebrovascular accident   on exam: hypertensive; Cr 1.91 (baseline 1.6)  PLAN: cont ivf; CM consult for placement; PT / OT eval - tx; cont home DAPT and statin; monitor BP's     Date: 8/22/23 - CHANGED TO INPATIENT   Continues to have aphasia; Hypertension - uncontrolled; RICARDO improving - Cr 1.79; PT / OT recom rehab   Plan: cont ivf; CM consult for placement; cont home DAPT and statin; monitor BP's       ED Triage Vitals   Temperature Pulse Respirations Blood Pressure SpO2   08/21/23 2001 08/21/23 2001 08/21/23 2001 08/21/23 2001 08/21/23 2001   97.6 °F (36.4 °C) 86 18 147/67 98 %      Temp Source Heart Rate Source Patient Position - Orthostatic VS BP Location FiO2 (%)   08/21/23 2001 08/21/23 2001 08/21/23 2001 08/21/23 2001 --   Tympanic Monitor Sitting Left arm       Pain Score       08/22/23 0745       No Pain          Wt Readings from Last 1 Encounters:   08/21/23 74.8 kg (165 lb)       Additional Vital Signs:   Date/Time Temp Pulse Resp BP MAP (mmHg) SpO2 O2 Device Patient Position - Orthostatic VS   08/22/23 0900 -- 65 13 165/70 -- 99 % None (Room air) --   08/22/23 0800 -- 72 18 173/73 Abnormal  -- 95 % None (Room air) --   08/22/23 0700 -- 75 13 179/72 Abnormal  -- 95 % None (Room air) --   08/22/23 0500 -- 69 13 183/74 Abnormal  107 95 % None (Room air) Lying   08/22/23 0330 -- 79 16 160/73 105 99 % None (Room air) Lying   08/21/23 2300 -- 68 22 155/69 99 98 % None (Room air) Sitting   08/21/23 2230 -- 68 19 151/67 97 95 % None (Room air) Sitting   08/21/23 2001 97.6 °F (36.4 °C) 86 18 147/67 -- 98 % None (Room air) Sitting           EKG:  NSR. HR 73.       Results from last 7 days   Lab Units 08/22/23  0552 08/21/23 2140 08/18/23 0436 08/17/23  0531   WBC Thousand/uL 9.64 8.43 7.72 7.74   HEMOGLOBIN g/dL 13.2 13.9 13.2 13.3   HEMATOCRIT % 38.4 39.6 37.5 37.7   PLATELETS Thousands/uL 248 275 246 243   NEUTROS ABS Thousands/µL 6.66 5.40 4.71 5.13         Results from last 7 days   Lab Units 08/22/23  0552 08/21/23 2140 08/18/23 0436 08/17/23  0531   SODIUM mmol/L 138 136 137 138   POTASSIUM mmol/L 4.2 4.3 3.6 3.4*   CHLORIDE mmol/L 106 104 108 108   CO2 mmol/L 21 22 19* 20*   ANION GAP mmol/L 11 10 10 10   BUN mg/dL 26* 28* 23 26*   CREATININE mg/dL 1.79* 1.91* 1.60* 1.69*   EGFR ml/min/1.73sq m 27 25 31 29   CALCIUM mg/dL 9.4 10.1 9.1 9.1     Results from last 7 days   Lab Units 08/21/23 2140   AST U/L 24   ALT U/L 27   ALK PHOS U/L 100   TOTAL PROTEIN g/dL 7.8   ALBUMIN g/dL 4.5   TOTAL BILIRUBIN mg/dL 0.72     Results from last 7 days   Lab Units 08/22/23  1535 08/22/23  1057 08/22/23  0711 08/19/23  1056 08/19/23  0718 08/18/23  1959 08/18/23  1642 08/18/23  1114 08/18/23  0724 08/17/23  2046 08/17/23  1622 08/17/23  1118   POC GLUCOSE mg/dl 145* 138 153* 254* 196* 137 138 260* 163* 149* 209* 184*     Results from last 7 days   Lab Units 08/22/23  0552 08/21/23 2140 08/18/23 0436 08/17/23  0531   GLUCOSE RANDOM mg/dL 166* 158* 171* 187*         Results from last 7 days   Lab Units 08/16/23  0544   HEMOGLOBIN A1C % 8.5*   EAG mg/dl 197             Results from last 7 days   Lab Units 08/22/23  0552   CLARITY UA  Turbid   COLOR UA  Light Yellow   SPEC GRAV UA  1.012   PH UA  5.5   GLUCOSE UA mg/dl Negative   KETONES UA mg/dl Negative   BLOOD UA  Negative   PROTEIN UA mg/dl 70 (1+)*   NITRITE UA  Negative   BILIRUBIN UA  Negative   UROBILINOGEN UA (BE) mg/dl <2.0   LEUKOCYTES UA  Large*   WBC UA /hpf 20-30*   RBC UA /hpf 2-4*   BACTERIA UA /hpf Occasional   EPITHELIAL CELLS WET PREP /hpf Occasional       ED Treatment:   Medication Administration from 08/21/2023 1952 to 08/22/2023 1004       Date/Time Order Dose Route Action     08/21/2023 2237 EDT sodium chloride 0.9 % bolus 1,000 mL 1,000 mL Intravenous New Bag     08/22/2023 0053 EDT ticagrelor (BRILINTA) tablet 90 mg 90 mg Oral Given     08/22/2023 0053 EDT traZODone (DESYREL) tablet 50 mg 50 mg Oral Given     08/22/2023 0551 EDT heparin (porcine) subcutaneous injection 5,000 Units 5,000 Units Subcutaneous Given     08/22/2023 0728 EDT insulin lispro (HumaLOG) 100 units/mL subcutaneous injection 1-6 Units 1 Units Subcutaneous Given          Present on Admission:  • Stage 3b chronic kidney disease ([address])  • Type 2 diabetes mellitus with diabetic peripheral angiopathy without gangrene ([address])  • Obesity, morbid ([address])  • Hypertension      Admitting Diagnosis: Weakness generalized [R53.1]     Age/Sex: 70 y.o. female     Admission Orders: SCDs; Consistent Carbohydrate Diet. Blood glucose checks ACHS. Scheduled Medications:  allopurinol, 100 mg, Oral, Daily  amLODIPine, 10 mg, Oral, Daily  aspirin, 81 mg, Oral, Daily  heparin (porcine), 5,000 Units, Subcutaneous, Q8H JAVIER  insulin lispro, 1-6 Units, Subcutaneous, TID AC  labetalol, 300 mg, Oral, BID  pravastatin, 80 mg, Oral, Daily With Dinner  ticagrelor, 90 mg, Oral, Q12H JAVIER  traZODone, 50 mg, Oral, HS      Continuous IV Infusions: none     PRN Meds: none       IP CONSULT TO CASE MANAGEMENT    Network Utilization Review Department  ATTENTION: Please call with any questions or concerns to [phone] and carefully listen to the prompts so that you are directed to the right person. All voicemails are confidential.  Millicent Castaneda all requests for admission clinical reviews, approved or denied determinations and any other requests to dedicated fax number below belonging to the campus where the patient is receiving treatment.  List of dedicated fax numbers for the Facilities:  [place] DENIALS (Administrative/Medical Necessity) [phone] 230[address] [facility] (Maternity/NICU/Pediatrics) [phone] [address] [facility] [phone] 1505 [facility] [address] [address] [facility] [address] [place] [address] [address]  [phone]

## 2023-08-22 NOTE — ASSESSMENT & PLAN NOTE
Lab Results   Component Value Date    HGBA1C 8.5 (H) 08/16/2023     · Hold home oral antihyperglycemics   · Correctional SSI  · Hypoglycemia protocol  · Diabetic diet

## 2023-08-22 NOTE — ASSESSMENT & PLAN NOTE
· /69  · Continue home amlodipine, clonidine, and labetalol  · Holding home losartan in setting of elevated creatinine  · Monitor BP per unit protocol

## 2023-08-22 NOTE — ASSESSMENT & PLAN NOTE
Lab Results   Component Value Date    EGFR 25 08/21/2023    EGFR 31 08/18/2023    EGFR 29 08/17/2023    CREATININE 1.91 (H) 08/21/2023    CREATININE 1.60 (H) 08/18/2023    CREATININE 1.69 (H) 08/17/2023     · Creatinine 1.91; baseline around 1.6   · Avoid nephrotoxic agents  · Holding home losartan  · Gentle IVF hydration overnight  · AM BMP

## 2023-08-22 NOTE — SPEECH THERAPY NOTE
Speech Language/Pathology   Patient passed nursing dysphagia screen; presently tolerating oral diet. Known to this dept from prev admissions, no hx of dysphagia. No need for formal dysphagia evaluation at this time. Of note, speech/language function evaluated during the previous admission (8/16/23) w/ subsequent treatment for expressive language skills. Pt & son deny changes in deficit since that time. Will benefit from rehab services following discharge for tx of expressive aphasia. See note per this writer for additional information. Given the above, no further intervention while in the acute care setting pending placement.  D/w RN/MD.         Rebecca Salinas, [address] [facility], [address] [facility]  Speech-Language Pathologist  [place] #[id number]  [place] #[id number]

## 2023-08-23 PROBLEM — N30.00 ACUTE CYSTITIS: Status: ACTIVE | Noted: 2023-08-23

## 2023-08-23 LAB
ANION GAP SERPL CALCULATED.3IONS-SCNC: 9 MMOL/L
BASOPHILS # BLD AUTO: 0.05 THOUSANDS/ÂΜL (ref 0–0.1)
BASOPHILS NFR BLD AUTO: 1 % (ref 0–1)
BUN SERPL-MCNC: 25 MG/DL (ref 5–25)
CALCIUM SERPL-MCNC: 9.2 MG/DL (ref 8.4–10.2)
CHLORIDE SERPL-SCNC: 107 MMOL/L (ref 96–108)
CO2 SERPL-SCNC: 22 MMOL/L (ref 21–32)
CREAT SERPL-MCNC: 1.65 MG/DL (ref 0.6–1.3)
EOSINOPHIL # BLD AUTO: 0.36 THOUSAND/ÂΜL (ref 0–0.61)
EOSINOPHIL NFR BLD AUTO: 5 % (ref 0–6)
ERYTHROCYTE [DISTWIDTH] IN BLOOD BY AUTOMATED COUNT: 14.4 % (ref 11.6–15.1)
GFR SERPL CREATININE-BSD FRML MDRD: 29 ML/MIN/1.73SQ M
GLUCOSE SERPL-MCNC: 110 MG/DL (ref 65–140)
GLUCOSE SERPL-MCNC: 117 MG/DL (ref 65–140)
GLUCOSE SERPL-MCNC: 153 MG/DL (ref 65–140)
GLUCOSE SERPL-MCNC: 209 MG/DL (ref 65–140)
GLUCOSE SERPL-MCNC: 216 MG/DL (ref 65–140)
HCT VFR BLD AUTO: 36.6 % (ref 34.8–46.1)
HGB BLD-MCNC: 12.7 G/DL (ref 11.5–15.4)
IMM GRANULOCYTES # BLD AUTO: 0.05 THOUSAND/UL (ref 0–0.2)
IMM GRANULOCYTES NFR BLD AUTO: 1 % (ref 0–2)
LYMPHOCYTES # BLD AUTO: 1.88 THOUSANDS/ÂΜL (ref 0.6–4.47)
LYMPHOCYTES NFR BLD AUTO: 26 % (ref 14–44)
MCH RBC QN AUTO: 30.7 PG (ref 26.8–34.3)
MCHC RBC AUTO-ENTMCNC: 34.7 G/DL (ref 31.4–37.4)
MCV RBC AUTO: 88 FL (ref 82–98)
MONOCYTES # BLD AUTO: 0.85 THOUSAND/ÂΜL (ref 0.17–1.22)
MONOCYTES NFR BLD AUTO: 12 % (ref 4–12)
NEUTROPHILS # BLD AUTO: 3.97 THOUSANDS/ÂΜL (ref 1.85–7.62)
NEUTS SEG NFR BLD AUTO: 55 % (ref 43–75)
NRBC BLD AUTO-RTO: 0 /100 WBCS
PLATELET # BLD AUTO: 231 THOUSANDS/UL (ref 149–390)
PMV BLD AUTO: 9.9 FL (ref 8.9–12.7)
POTASSIUM SERPL-SCNC: 3.8 MMOL/L (ref 3.5–5.3)
RBC # BLD AUTO: 4.14 MILLION/UL (ref 3.81–5.12)
SODIUM SERPL-SCNC: 138 MMOL/L (ref 135–147)
WBC # BLD AUTO: 7.16 THOUSAND/UL (ref 4.31–10.16)

## 2023-08-23 PROCEDURE — 80048 BASIC METABOLIC PNL TOTAL CA: CPT | Performed by: FAMILY MEDICINE

## 2023-08-23 PROCEDURE — 99233 SBSQ HOSP IP/OBS HIGH 50: CPT | Performed by: FAMILY MEDICINE

## 2023-08-23 PROCEDURE — 85025 COMPLETE CBC W/AUTO DIFF WBC: CPT | Performed by: FAMILY MEDICINE

## 2023-08-23 PROCEDURE — 82948 REAGENT STRIP/BLOOD GLUCOSE: CPT

## 2023-08-23 RX ORDER — SACCHAROMYCES BOULARDII 250 MG
250 CAPSULE ORAL 2 TIMES DAILY
Status: DISCONTINUED | OUTPATIENT
Start: 2023-08-23 | End: 2023-08-25 | Stop reason: HOSPADM

## 2023-08-23 RX ORDER — CEPHALEXIN 500 MG/1
500 CAPSULE ORAL EVERY 8 HOURS SCHEDULED
Status: DISCONTINUED | OUTPATIENT
Start: 2023-08-23 | End: 2023-08-25 | Stop reason: HOSPADM

## 2023-08-23 RX ADMIN — ASPIRIN 81 MG: 81 TABLET, CHEWABLE ORAL at 10:03

## 2023-08-23 RX ADMIN — AMLODIPINE BESYLATE 10 MG: 10 TABLET ORAL at 10:03

## 2023-08-23 RX ADMIN — CEPHALEXIN 500 MG: 500 CAPSULE ORAL at 14:20

## 2023-08-23 RX ADMIN — HEPARIN SODIUM 5000 UNITS: 5000 INJECTION INTRAVENOUS; SUBCUTANEOUS at 05:07

## 2023-08-23 RX ADMIN — INSULIN LISPRO 1 UNITS: 100 INJECTION, SOLUTION INTRAVENOUS; SUBCUTANEOUS at 17:50

## 2023-08-23 RX ADMIN — CEPHALEXIN 500 MG: 500 CAPSULE ORAL at 21:40

## 2023-08-23 RX ADMIN — HEPARIN SODIUM 5000 UNITS: 5000 INJECTION INTRAVENOUS; SUBCUTANEOUS at 14:19

## 2023-08-23 RX ADMIN — HEPARIN SODIUM 5000 UNITS: 5000 INJECTION INTRAVENOUS; SUBCUTANEOUS at 21:40

## 2023-08-23 RX ADMIN — PRAVASTATIN SODIUM 80 MG: 80 TABLET ORAL at 17:47

## 2023-08-23 RX ADMIN — ALLOPURINOL 100 MG: 100 TABLET ORAL at 10:03

## 2023-08-23 RX ADMIN — INSULIN LISPRO 2 UNITS: 100 INJECTION, SOLUTION INTRAVENOUS; SUBCUTANEOUS at 14:20

## 2023-08-23 RX ADMIN — LABETALOL HYDROCHLORIDE 300 MG: 100 TABLET, FILM COATED ORAL at 17:46

## 2023-08-23 RX ADMIN — Medication 250 MG: at 14:20

## 2023-08-23 RX ADMIN — TICAGRELOR 90 MG: 90 TABLET ORAL at 21:40

## 2023-08-23 RX ADMIN — TRAZODONE HYDROCHLORIDE 50 MG: 50 TABLET ORAL at 21:40

## 2023-08-23 RX ADMIN — TICAGRELOR 90 MG: 90 TABLET ORAL at 10:03

## 2023-08-23 RX ADMIN — Medication 250 MG: at 17:49

## 2023-08-23 RX ADMIN — INSULIN LISPRO 2 UNITS: 100 INJECTION, SOLUTION INTRAVENOUS; SUBCUTANEOUS at 11:02

## 2023-08-23 RX ADMIN — LABETALOL HYDROCHLORIDE 300 MG: 100 TABLET, FILM COATED ORAL at 10:03

## 2023-08-23 RX ADMIN — CINACALCET 30 MG: 30 TABLET, FILM COATED ORAL at 10:03

## 2023-08-23 NOTE — ASSESSMENT & PLAN NOTE
Lab Results   Component Value Date    HGBA1C 8.5 (H) 08/16/2023     · 117 this AM, well controlled today  · Hold home oral antihyperglycemics   · Correctional SSI  · Hypoglycemia protocol  · Diabetic diet

## 2023-08-23 NOTE — PLAN OF CARE
Problem: Potential for Falls  Goal: Patient will remain free of falls  Description: INTERVENTIONS:  - Educate patient/family on patient safety including physical limitations  - Instruct patient to call for assistance with activity   - Consult OT/PT to assist with strengthening/mobility   - Keep Call bell within reach  - Keep bed low and locked with side rails adjusted as appropriate  - Keep care items and personal belongings within reach  - Initiate and maintain comfort rounds  - Make Fall Risk Sign visible to staff  - Offer Toileting every  Hours, in advance of need  - Initiate/Maintain alarm  - Obtain necessary fall risk management equipment:   - Apply yellow socks and bracelet for high fall risk patients  - Consider moving patient to room near nurses station  Outcome: Progressing     Problem: MOBILITY - ADULT  Goal: Maintain or return to baseline ADL function  Description: INTERVENTIONS:  -  Assess patient's ability to carry out ADLs; assess patient's baseline for ADL function and identify physical deficits which impact ability to perform ADLs (bathing, care of mouth/teeth, toileting, grooming, dressing, etc.)  - Assess/evaluate cause of self-care deficits   - Assess range of motion  - Assess patient's mobility; develop plan if impaired  - Assess patient's need for assistive devices and provide as appropriate  - Encourage maximum independence but intervene and supervise when necessary  - Involve family in performance of ADLs  - Assess for home care needs following discharge   - Consider OT consult to assist with ADL evaluation and planning for discharge  - Provide patient education as appropriate  Outcome: Progressing  Goal: Maintains/Returns to pre admission functional level  Description: INTERVENTIONS:  - Perform BMAT or MOVE assessment daily.   - Set and communicate daily mobility goal to care team and patient/family/caregiver.    - Collaborate with rehabilitation services on mobility goals if consulted  - Perform Range of Motion times a day. - Reposition patient every  hours.   - Dangle patient  times a day  - Stand patient  times a day  - Ambulate patient  times a day  - Out of bed to chair  times a day   - Out of bed for meal times a day  - Out of bed for toileting  - Record patient progress and toleration of activity level   Outcome: Progressing     Problem: Prexisting or High Potential for Compromised Skin Integrity  Goal: Skin integrity is maintained or improved  Description: INTERVENTIONS:  - Identify patients at risk for skin breakdown  - Assess and monitor skin integrity  - Assess and monitor nutrition and hydration status  - Monitor labs   - Assess for incontinence   - Turn and reposition patient  - Assist with mobility/ambulation  - Relieve pressure over bony prominences  - Avoid friction and shearing  - Provide appropriate hygiene as needed including keeping skin clean and dry  - Evaluate need for skin moisturizer/barrier cream  - Collaborate with interdisciplinary team   - Patient/family teaching  - Consider wound care consult   Outcome: Progressing

## 2023-08-23 NOTE — ASSESSMENT & PLAN NOTE
· Recent discharge s/p acute punctate stroke in the left occipital and left MCA territory, cont DAPT  · Unable to care for herself at home/ADLs  · Family and patient wanting placement  · No new neuro symptoms  · Regular diet as per speech  · Previous hospitalizations imaging reviewed: 70% ICA stenosis b/l. OP vascular followup (discussed with the vascular AP Kya, will schedule OP followup for the patient )

## 2023-08-23 NOTE — WOUND OSTOMY CARE
Progress Note - Wound   Angelito Rosewood 70 y.o. female MRN: [id number]  Unit/Bed#: MS Buenrostro-Gabrielle Encounter: [id number]      Assessment:   Patient admitted due to aphasia as late inset of CVA. History of CKD, arthritis, diabetes, HTN, PVD, stroke. Wound care consulted for bilateral buttock wounds. Patient agreeable to assessment, alert and oriented x3, difficulty with word finding, continent of bowel and bladder, OOB to chair with EHOB waffle cushion in place, heels elevated, is an assist of 1 with walker to stand for assessment. Primary RN made aware of assessment. 1. Pressure injury bilateral buttock, unstageable-POA- patient states she has had these wounds come and go for the past 4 years. Wounds are oval in shape, approx. 30% dry brown adhered scabbing scattered throughout and 70% non-blanchable pink intact skin, no drainage noted. Miley-wound is dry, intact, blanchable pink and hyperpigmented skin. 2. Bilateral sacrum, hips, elbows, and heels- skin is dry, intact, blanchable. Educated patient on importance of frequent offloading of pressure via turning, repositioning, and weight-shifting. No induration, fluctuance, odor, warmth, redness, or purulence noted to the above noted wound. New dressing applied. Patient tolerated well, reports tenderness to the wound. Primary nurse aware of plan of care. See flow sheets for more detailed assessment findings. Will follow along. Skin care Plan:  1-Cleanse sacro-buttocks with soap and water. Apply small clover Allevyn foam dressing over left buttock wound and another on right buttock wound. Fabián with T for treatment. Change every three days and PRN. 2-Turn/reposition q2h for pressure re-distribution on skin . 3-Elevate heels to offload pressure  4-Moisturize skin daily with skin nourishing cream  5-Ehob cushion in chair when out of bed. 6-Hydraguard to bilateral heels BID and PRN. 7-Apply accu-max pump to mattress.       Wound 08/23/23 Pressure Injury Buttocks Bilateral (Active)   Wound Image   08/23/23 1537   Wound Description Dry;Brown;Non-blanchable erythema 08/23/23 1537   Pressure Injury Stage U 08/23/23 1537   Miley-wound Assessment Dry; Intact; Hyperpigmented 08/23/23 1537   Wound Length (cm) 2 cm 08/23/23 1537   Wound Width (cm) 4 cm 08/23/23 1537   Wound Depth (cm) 0 cm 08/23/23 1537   Wound Surface Area (cm^2) 8 cm^2 08/23/23 1537   Wound Volume (cm^3) 0 cm^3 08/23/23 1537   Calculated Wound Volume (cm^3) 0 cm^3 08/23/23 1537   Tunneling 0 cm 08/23/23 1537   Undermining 0 08/23/23 1537   Drainage Amount None 08/23/23 1537   Non-staged Wound Description Not applicable 83/14/41 7930   Treatments Cleansed;Site care 08/23/23 1537   Dressing Foam, Silicon (eg. Allevyn, etc) 08/23/23 1537   Wound packed? No 08/23/23 1537   Dressing Changed Changed 08/23/23 1537   Patient Tolerance Tolerated well 08/23/23 1537   Dressing Status Clean;Dry; Intact 08/23/23 1537       Call or tigertext with any questions  Wound Care will continue to follow    Cyndi TIJERINAN RN CWON  Wound and Ostomy care

## 2023-08-23 NOTE — ASSESSMENT & PLAN NOTE
/72   Pulse 74   Temp (!) 97.3 °F (36.3 °C)   Resp 18   Ht 4' 10" (1.473 m)   Wt 74.8 kg (165 lb)   LMP  (LMP Unknown)   SpO2 97%   BMI 34.49 kg/m²   · stable  · Cont current meds

## 2023-08-23 NOTE — UTILIZATION REVIEW
Continued Stay Review    Date: 8/23                      Current Patient Class: IP   Current Level of Care: MS    HPI:78 y.o. female initially admitted on  8/22 as IP     Assessment/Plan:     Date: 8/23    Day 3: Has surpassed a 2nd midnight with active treatments and services, which include cont w/ expressive aphasia . CR 1.65 at baseline . Cont gentle IVF , BMP in am . Keflex for acute cystitis .          Vital Signs:   08/23/23 07:40:05 97.3 °F (36.3 °C) Abnormal  74 -- 124/72 89 97 % -- --   08/23/23 06:15:26 98.2 °F (36.8 °C) 76 -- 124/72 89 97 %           Pertinent Labs/Diagnostic Results:       Results from last 7 days   Lab Units 08/23/23 0441 08/22/23 0552 08/21/23 2140 08/18/23 0436 08/17/23  0531   WBC Thousand/uL 7.16 9.64 8.43 7.72 7.74   HEMOGLOBIN g/dL 12.7 13.2 13.9 13.2 13.3   HEMATOCRIT % 36.6 38.4 39.6 37.5 37.7   PLATELETS Thousands/uL 231 248 275 246 243   NEUTROS ABS Thousands/µL 3.97 6.66 5.40 4.71 5.13         Results from last 7 days   Lab Units 08/23/23 0441 08/22/23 0552 08/21/23 2140 08/18/23 0436 08/17/23  0531   SODIUM mmol/L 138 138 136 137 138   POTASSIUM mmol/L 3.8 4.2 4.3 3.6 3.4*   CHLORIDE mmol/L 107 106 104 108 108   CO2 mmol/L 22 21 22 19* 20*   ANION GAP mmol/L 9 11 10 10 10   BUN mg/dL 25 26* 28* 23 26*   CREATININE mg/dL 1.65* 1.79* 1.91* 1.60* 1.69*   EGFR ml/min/1.73sq m 29 27 25 31 29   CALCIUM mg/dL 9.2 9.4 10.1 9.1 9.1     Results from last 7 days   Lab Units 08/21/23 2140   AST U/L 24   ALT U/L 27   ALK PHOS U/L 100   TOTAL PROTEIN g/dL 7.8   ALBUMIN g/dL 4.5   TOTAL BILIRUBIN mg/dL 0.72     Results from last 7 days   Lab Units 08/23/23  0738 08/22/23  2048 08/22/23  1755 08/22/23  1535 08/22/23  1057 08/22/23  0711 08/19/23  1056 08/19/23  0718 08/18/23  1959 08/18/23  1642 08/18/23  1114 08/18/23  0724   POC GLUCOSE mg/dl 209* 165* 95 145* 138 153* 254* 196* 137 138 260* 163*     Results from last 7 days   Lab Units 08/23/23  0441 08/22/23  0552 08/21/23  2140 08/18/23  0436 08/17/23  0531   GLUCOSE RANDOM mg/dL 117 166* 158* 171* 187*     Results from last 7 days   Lab Units 08/22/23  0552   CLARITY UA  Turbid   COLOR UA  Light Yellow   SPEC GRAV UA  1.012   PH UA  5.5   GLUCOSE UA mg/dl Negative   KETONES UA mg/dl Negative   BLOOD UA  Negative   PROTEIN UA mg/dl 70 (1+)*   NITRITE UA  Negative   BILIRUBIN UA  Negative   UROBILINOGEN UA (BE) mg/dl <2.0   LEUKOCYTES UA  Large*   WBC UA /hpf 20-30*   RBC UA /hpf 2-4*   BACTERIA UA /hpf Occasional   EPITHELIAL CELLS WET PREP /hpf Occasional       Medications:   Scheduled Medications:  allopurinol, 100 mg, Oral, Daily  amLODIPine, 10 mg, Oral, Daily  aspirin, 81 mg, Oral, Daily  cinacalcet, 30 mg, Oral, Every Other Day  [START ON 8/28/2023] cloNIDine, 1 patch, Transdermal, Weekly  heparin (porcine), 5,000 Units, Subcutaneous, Q8H JAVIER  insulin lispro, 1-6 Units, Subcutaneous, TID AC  labetalol, 300 mg, Oral, BID  pravastatin, 80 mg, Oral, Daily With Dinner  ticagrelor, 90 mg, Oral, Q12H JAVIER  traZODone, 50 mg, Oral, HS      Continuous IV Infusions:     PRN Meds:       Discharge Plan: D     Network Utilization Review Department  ATTENTION: Please call with any questions or concerns to [phone] and carefully listen to the prompts so that you are directed to the right person. All voicemails are confidential.  Jessica Mcconnell all requests for admission clinical reviews, approved or denied determinations and any other requests to dedicated fax number below belonging to the campus where the patient is receiving treatment.  List of dedicated fax numbers for the Facilities:  [place] DENIALS (Administrative/Medical Necessity) [phone] [facility] [facility] [phone]   [address] [address] [place] [address] [phone] 1505 [facility] [phone] [address] [facility] [address] [address] [facility] [address] [phone]

## 2023-08-23 NOTE — ASSESSMENT & PLAN NOTE
Lab Results   Component Value Date    EGFR 29 08/23/2023    EGFR 27 08/22/2023    EGFR 25 08/21/2023    CREATININE 1.65 (H) 08/23/2023    CREATININE 1.79 (H) 08/22/2023    CREATININE 1.91 (H) 08/21/2023     · stable  · Baseline around 1.6-1.8  · Avoid nephrotoxic agents  · Holding home losartan  · On gentle IV hydration  · AM BMP

## 2023-08-23 NOTE — PROGRESS NOTES
[address]  Progress Note  Name: Abdirahman Zhao  MRN: [id number]  Unit/Bed#: -01 I Date of Admission: 8/21/2023   Date of Service: 8/23/2023 I Hospital Day: 1    Assessment/Plan   * Aphasia as late effect of cerebrovascular accident (CVA)  Assessment & Plan  · Recent discharge s/p acute punctate stroke in the left occipital and left MCA territory, cont DAPT  · Unable to care for herself at home/ADLs  · Family and patient wanting placement  · No new neuro symptoms  · Regular diet as per speech  · Previous hospitalizations imaging reviewed: 70% ICA stenosis b/l. OP vascular followup (discussed with the vascular AP Kya, will schedule OP followup for the patient )    Acute cystitis  Assessment & Plan  · UC growing GNR enteric like, likely ecoli  · Will treat with keflex    Stage 3b chronic kidney disease [facility])  Assessment & Plan  Lab Results   Component Value Date    EGFR 29 08/23/2023    EGFR 27 08/22/2023    EGFR 25 08/21/2023    CREATININE 1.65 (H) 08/23/2023    CREATININE 1.79 (H) 08/22/2023    CREATININE 1.91 (H) 08/21/2023     · stable  · Baseline around 1.6-1.8  · Avoid nephrotoxic agents  · Holding home losartan  · On gentle IV hydration  · AM BMP     Type 2 diabetes mellitus with diabetic peripheral angiopathy without gangrene [facility])  Assessment & Plan    Lab Results   Component Value Date    HGBA1C 8.5 (H) 08/16/2023     · 117 this AM, well controlled today  · Hold home oral antihyperglycemics   · Correctional SSI  · Hypoglycemia protocol  · Diabetic diet     Hypertension  Assessment & Plan  /72   Pulse 74   Temp (!) 97.3 °F (36.3 °C)   Resp 18   Ht 4' 10" (1.473 m)   Wt 74.8 kg (165 lb)   LMP  (LMP Unknown)   SpO2 97%   BMI 34.49 kg/m²   · stable  · Cont current meds         VTE Pharmacologic Prophylaxis: VTE Score: 9 High Risk (Score >/= 5) - Pharmacological DVT Prophylaxis Ordered: heparin. Sequential Compression Devices Ordered.     Patient Centered Rounds: I performed bedside rounds with nursing staff today. Discussions with Specialists or Other Care Team Provider: CM    Education and Discussions with Family / Patient: Updated  (son) via phone. Total Time Spent on Date of Encounter in care of patient: 35 minutes This time was spent on one or more of the following: performing physical exam; counseling and coordination of care; obtaining or reviewing history; documenting in the medical record; reviewing/ordering tests, medications or procedures; communicating with other healthcare professionals and discussing with patient's family/caregivers. Current Length of Stay: 1 day(s)  Current Patient Status: Inpatient   Certification Statement: The patient will continue to require additional inpatient hospital stay due to pending placement  Discharge Plan: pending placement    Code Status: Level 1 - Full Code    Subjective:   Seen and examined at bedside  Continues to have expressive aphasia  No new symptoms    Objective:     Vitals:   Temp (24hrs), Av.8 °F (36.6 °C), Min:97.3 °F (36.3 °C), Max:98.2 °F (36.8 °C)    Temp:  [97.3 °F (36.3 °C)-98.2 °F (36.8 °C)] 97.3 °F (36.3 °C)  HR:  [73-90] 74  Resp:  [18-20] 18  BP: (124-145)/(60-72) 124/72  SpO2:  [97 %-100 %] 100 %  Body mass index is 34.49 kg/m².      Input and Output Summary (last 24 hours):   No intake or output data in the 24 hours ending 23 1244    Physical Exam:   Physical Exam General- Awake, alert and oriented x 3, looks comfortable  HEENT- Normocephalic, atraumatic, oral mucosa- moist  Neck- Supple, No carotid bruit, no JVD  CVS- Normal S1/ S2, Regular rate and rhythm, No murmur, No edema  Respiratory system- B/L clear breath sounds, no wheezing  Abdomen- Soft, Non distended, no tenderness, Bowel sound- present 4 quads  Genitourinary- No suprapubic tenderness, No CVA tenderness  Skin- No new bruise or rash  Musculoskeletal- No gross deformity  Psych- No acute psychosis  CNS- expressive aphasia, CN II- XII grossly intact, No acute focal neurologic deficit noted      Additional Data:     Labs:  Results from last 7 days   Lab Units 08/23/23  0441   WBC Thousand/uL 7.16   HEMOGLOBIN g/dL 12.7   HEMATOCRIT % 36.6   PLATELETS Thousands/uL 231   NEUTROS PCT % 55   LYMPHS PCT % 26   MONOS PCT % 12   EOS PCT % 5     Results from last 7 days   Lab Units 08/23/23  0441 08/22/23  0552 08/21/23  2140   SODIUM mmol/L 138   < > 136   POTASSIUM mmol/L 3.8   < > 4.3   CHLORIDE mmol/L 107   < > 104   CO2 mmol/L 22   < > 22   BUN mg/dL 25   < > 28*   CREATININE mg/dL 1.65*   < > 1.91*   ANION GAP mmol/L 9   < > 10   CALCIUM mg/dL 9.2   < > 10.1   ALBUMIN g/dL  --   --  4.5   TOTAL BILIRUBIN mg/dL  --   --  0.72   ALK PHOS U/L  --   --  100   ALT U/L  --   --  27   AST U/L  --   --  24   GLUCOSE RANDOM mg/dL 117   < > 158*    < > = values in this interval not displayed. Results from last 7 days   Lab Units 08/23/23  1134 08/23/23  0738 08/22/23  2048 08/22/23  1755 08/22/23  1535 08/22/23  1057 08/22/23  0711 08/19/23  1056 08/19/23  0718 08/18/23  1959 08/18/23  1642 08/18/23  1114   POC GLUCOSE mg/dl 216* 209* 165* 95 145* 138 153* 254* 196* 137 138 260*               Lines/Drains:  Invasive Devices     Peripheral Intravenous Line  Duration           Peripheral IV 08/21/23 Right Antecubital 1 day                      Imaging: No pertinent imaging reviewed.     Recent Cultures (last 7 days):   Results from last 7 days   Lab Units 08/22/23  0552   URINE CULTURE  >100,000 cfu/ml Gram Negative Miguel Enteric Like*       Last 24 Hours Medication List:   Current Facility-Administered Medications   Medication Dose Route Frequency Provider Last Rate   • allopurinol  100 mg Oral Daily Sha Llanes PA-C     • amLODIPine  10 mg Oral Daily Fransisca Taylor     • aspirin  81 mg Oral Daily Fransisca Taylor     • cinacalcet  30 mg Oral Every Other Day Sha Llanes PA-C     • [START ON 8/28/2023] cloNIDine  1 patch Transdermal Weekly [address], PA-C     • heparin (porcine)  5,000 Units Subcutaneous [place], [place]     • insulin lispro  1-6 Units Subcutaneous TID [facility] BERNARDINO Thomas     • labetalol  300 mg Oral  [place], [place]     • pravastatin  80 mg Oral Daily With [address] [place], [place]     • ticagrelor  90 mg Oral Q12H [place], [place]     • traZODone  50 mg Oral  [address], PA-C          Today, Patient Was Seen By: Leopoldo Rondo, MD    **Please Note: This note may have been constructed using a voice recognition system. **

## 2023-08-24 LAB
ATRIAL RATE: 73 BPM
ATRIAL RATE: 80 BPM
BACTERIA UR CULT: ABNORMAL
GLUCOSE SERPL-MCNC: 168 MG/DL (ref 65–140)
GLUCOSE SERPL-MCNC: 169 MG/DL (ref 65–140)
GLUCOSE SERPL-MCNC: 176 MG/DL (ref 65–140)
GLUCOSE SERPL-MCNC: 177 MG/DL (ref 65–140)
P AXIS: 37 DEGREES
P AXIS: 85 DEGREES
PR INTERVAL: 202 MS
PR INTERVAL: 208 MS
QRS AXIS: -19 DEGREES
QRS AXIS: 1 DEGREES
QRSD INTERVAL: 90 MS
QRSD INTERVAL: 92 MS
QT INTERVAL: 394 MS
QT INTERVAL: 400 MS
QTC INTERVAL: 440 MS
QTC INTERVAL: 454 MS
T WAVE AXIS: 118 DEGREES
T WAVE AXIS: 124 DEGREES
VENTRICULAR RATE: 73 BPM
VENTRICULAR RATE: 80 BPM

## 2023-08-24 PROCEDURE — 97110 THERAPEUTIC EXERCISES: CPT

## 2023-08-24 PROCEDURE — 99233 SBSQ HOSP IP/OBS HIGH 50: CPT | Performed by: FAMILY MEDICINE

## 2023-08-24 PROCEDURE — 97116 GAIT TRAINING THERAPY: CPT

## 2023-08-24 PROCEDURE — 93010 ELECTROCARDIOGRAM REPORT: CPT | Performed by: INTERNAL MEDICINE

## 2023-08-24 PROCEDURE — 82948 REAGENT STRIP/BLOOD GLUCOSE: CPT

## 2023-08-24 RX ADMIN — PRAVASTATIN SODIUM 80 MG: 80 TABLET ORAL at 18:17

## 2023-08-24 RX ADMIN — AMLODIPINE BESYLATE 10 MG: 10 TABLET ORAL at 09:00

## 2023-08-24 RX ADMIN — ALLOPURINOL 100 MG: 100 TABLET ORAL at 09:00

## 2023-08-24 RX ADMIN — LABETALOL HYDROCHLORIDE 300 MG: 100 TABLET, FILM COATED ORAL at 18:17

## 2023-08-24 RX ADMIN — TRAZODONE HYDROCHLORIDE 50 MG: 50 TABLET ORAL at 23:55

## 2023-08-24 RX ADMIN — HEPARIN SODIUM 5000 UNITS: 5000 INJECTION INTRAVENOUS; SUBCUTANEOUS at 05:23

## 2023-08-24 RX ADMIN — LABETALOL HYDROCHLORIDE 300 MG: 100 TABLET, FILM COATED ORAL at 09:00

## 2023-08-24 RX ADMIN — CEPHALEXIN 500 MG: 500 CAPSULE ORAL at 05:23

## 2023-08-24 RX ADMIN — Medication 250 MG: at 18:17

## 2023-08-24 RX ADMIN — HEPARIN SODIUM 5000 UNITS: 5000 INJECTION INTRAVENOUS; SUBCUTANEOUS at 23:55

## 2023-08-24 RX ADMIN — ASPIRIN 81 MG: 81 TABLET, CHEWABLE ORAL at 09:00

## 2023-08-24 RX ADMIN — INSULIN LISPRO 1 UNITS: 100 INJECTION, SOLUTION INTRAVENOUS; SUBCUTANEOUS at 18:19

## 2023-08-24 RX ADMIN — INSULIN LISPRO 1 UNITS: 100 INJECTION, SOLUTION INTRAVENOUS; SUBCUTANEOUS at 09:03

## 2023-08-24 RX ADMIN — CEPHALEXIN 500 MG: 500 CAPSULE ORAL at 23:55

## 2023-08-24 RX ADMIN — Medication 250 MG: at 09:00

## 2023-08-24 RX ADMIN — HEPARIN SODIUM 5000 UNITS: 5000 INJECTION INTRAVENOUS; SUBCUTANEOUS at 15:33

## 2023-08-24 RX ADMIN — TICAGRELOR 90 MG: 90 TABLET ORAL at 09:00

## 2023-08-24 RX ADMIN — TICAGRELOR 90 MG: 90 TABLET ORAL at 23:55

## 2023-08-24 RX ADMIN — CEPHALEXIN 500 MG: 500 CAPSULE ORAL at 15:33

## 2023-08-24 NOTE — PLAN OF CARE
Problem: Potential for Falls  Goal: Patient will remain free of falls  Description: INTERVENTIONS:  - Educate patient/family on patient safety including physical limitations  - Instruct patient to call for assistance with activity   - Consult OT/PT to assist with strengthening/mobility   - Keep Call bell within reach  - Keep bed low and locked with side rails adjusted as appropriate  - Keep care items and personal belongings within reach  - Initiate and maintain comfort rounds  - Make Fall Risk Sign visible to [facility] every  Hours, in advance of need  - Initiate/Maintainalarm  - Obtain necessary fall risk management equipment:   - Apply yellow socks and bracelet for high fall risk patients  - Consider moving patient to room near nurses station  Outcome: Progressing

## 2023-08-24 NOTE — ASSESSMENT & PLAN NOTE
· Stable, ongoing issue  · Recent discharge s/p acute punctate stroke in the left occipital and left MCA territory, cont DAPT  · Unable to care for herself at home/ADLs  · Family and patient wanting placement  · No new neuro symptoms  · Regular diet as per speech  · Previous hospitalizations imaging reviewed: 70% ICA stenosis b/l. OP vascular followup (discussed with the vascular AP Kya, will schedule OP followup for the patient )

## 2023-08-24 NOTE — CASE MANAGEMENT
Case Management Progress Note    Patient name Keyona Segura  Location /-01 MRN [id number]  : 1945 Date 2023       LOS (days): 1  Geometric Mean LOS (GMLOS) (days): 2.60  Days to GMLOS:1.4        OBJECTIVE:        Current admission status: Inpatient  Preferred Pharmacy:   [address] [place] - [address]  [address]  [address] [address]  Phone: [phone] Fax: [phone]    [id number] [place] [facility] [address] [facility] [address]  Phone: [phone] Fax: [phone]    Primary Care Provider: Gideon Jose MD    Primary Insurance: [facility]  Secondary Insurance:     PROGRESS NOTE:    CM contacted Gigi Osborn via [address] and calls to Admission Dept and Liaison. Santos Santos were   covering  Referral throughout the day. Shaina Curry is still pending- their Danne Newington submitted yesterday. Son updated to status. CM discussed the accepting STR Providers to date as a backup plan should Flo Wolff be denied. Plan- Awaiting Shaina Curry decision for [facility].

## 2023-08-24 NOTE — PROGRESS NOTES
[address]  Progress Note  Name: Abdirahman Zhao  MRN: [id number]  Unit/Bed#: -01 I Date of Admission: 8/21/2023   Date of Service: 8/24/2023 I Hospital Day: 2    Assessment/Plan   * Aphasia as late effect of cerebrovascular accident (CVA)  Assessment & Plan  · Stable, ongoing issue  · Recent discharge s/p acute punctate stroke in the left occipital and left MCA territory, cont DAPT  · Unable to care for herself at home/ADLs  · Family and patient wanting placement  · No new neuro symptoms  · Regular diet as per speech  · Previous hospitalizations imaging reviewed: 70% ICA stenosis b/l. OP vascular followup (discussed with the vascular AP Kya, will schedule OP followup for the patient )    Acute cystitis  Assessment & Plan  · UC growing GNR enteric like, likely ecoli  · Will treat with keflex    Stage 3b chronic kidney disease [facility])  Assessment & Plan  Lab Results   Component Value Date    EGFR 29 08/23/2023    EGFR 27 08/22/2023    EGFR 25 08/21/2023    CREATININE 1.65 (H) 08/23/2023    CREATININE 1.79 (H) 08/22/2023    CREATININE 1.91 (H) 08/21/2023     · stable  · Baseline around 1.6-1.8  · Avoid nephrotoxic agents  · Holding home losartan  · On gentle IV hydration  · AM BMP     Type 2 diabetes mellitus with diabetic peripheral angiopathy without gangrene [facility])  Assessment & Plan    Lab Results   Component Value Date    HGBA1C 8.5 (H) 08/16/2023     · 169 this AM  · Hold home oral antihyperglycemics   · Correctional SSI  · Hypoglycemia protocol  · Diabetic diet     Obesity, morbid ([address])  Assessment & Plan  · Recommend weight loss via healthy diet and exercise   · Counseled regarding this for >3 m    Hypertension  Assessment & Plan  /75   Pulse 83   Temp 97.5 °F (36.4 °C)   Resp 18   Ht 4' 10" (1.473 m)   Wt 74.8 kg (165 lb)   LMP  (LMP Unknown)   SpO2 97%   BMI 34.49 kg/m²   · Stable, well contolled  · Cont current meds             VTE Pharmacologic Prophylaxis: VTE Score: 9 High Risk (Score >/= 5) - Pharmacological DVT Prophylaxis Ordered: heparin. Sequential Compression Devices Ordered. Patient Centered Rounds: I performed bedside rounds with nursing staff today. Discussions with Specialists or Other Care Team Provider: Yes, neurology, case management    Education and Discussions with Family / Patient: Patient's son has been kept updated. Total Time Spent on Date of Encounter in care of patient: 25 minutes This time was spent on one or more of the following: performing physical exam; counseling and coordination of care; obtaining or reviewing history; documenting in the medical record; reviewing/ordering tests, medications or procedures; communicating with other healthcare professionals and discussing with patient's family/caregivers. Current Length of Stay: 2 day(s)  Current Patient Status: Inpatient   Certification Statement: The patient will continue to require additional inpatient hospital stay due to Pending placement  Discharge Plan: Pending placement    Code Status: Level 1 - Full Code    Subjective:   Seen and examined at bedside  No new complaints    Objective:     Vitals:   Temp (24hrs), Av.6 °F (36.4 °C), Min:97.3 °F (36.3 °C), Max:97.9 °F (36.6 °C)    Temp:  [97.3 °F (36.3 °C)-97.9 °F (36.6 °C)] 97.5 °F (36.4 °C)  HR:  [67-83] 83  Resp:  [18] 18  BP: (125-130)/(72-75) 127/75  SpO2:  [97 %-98 %] 97 %  Body mass index is 34.49 kg/m².      Input and Output Summary (last 24 hours):   No intake or output data in the 24 hours ending 23 1330    Physical Exam:   Physical Exam continues to have expressive aphasia  S1-S2 audible, regular  Lungs are to auscultation bilaterally  No new focal neurodeficits  Alert and oriented & person    Additional Data:     Labs:  Results from last 7 days   Lab Units 23  0441   WBC Thousand/uL 7.16   HEMOGLOBIN g/dL 12.7   HEMATOCRIT % 36.6   PLATELETS Thousands/uL 231   NEUTROS PCT % 55   LYMPHS PCT % 26   MONOS PCT % 12   EOS PCT % 5     Results from last 7 days   Lab Units 08/23/23  0441 08/22/23  0552 08/21/23  2140   SODIUM mmol/L 138   < > 136   POTASSIUM mmol/L 3.8   < > 4.3   CHLORIDE mmol/L 107   < > 104   CO2 mmol/L 22   < > 22   BUN mg/dL 25   < > 28*   CREATININE mg/dL 1.65*   < > 1.91*   ANION GAP mmol/L 9   < > 10   CALCIUM mg/dL 9.2   < > 10.1   ALBUMIN g/dL  --   --  4.5   TOTAL BILIRUBIN mg/dL  --   --  0.72   ALK PHOS U/L  --   --  100   ALT U/L  --   --  27   AST U/L  --   --  24   GLUCOSE RANDOM mg/dL 117   < > 158*    < > = values in this interval not displayed. Results from last 7 days   Lab Units 08/24/23  1126 08/24/23  0739 08/23/23  2054 08/23/23  1600 08/23/23  1134 08/23/23  0738 08/22/23  2048 08/22/23  1755 08/22/23  1535 08/22/23  1057 08/22/23  0711 08/19/23  1056   POC GLUCOSE mg/dl 168* 169* 110 153* 216* 209* 165* 95 145* 138 153* 254*               Lines/Drains:  Invasive Devices     Peripheral Intravenous Line  Duration           Peripheral IV 08/21/23 Right Antecubital 2 days                      Imaging: No pertinent imaging reviewed.     Recent Cultures (last 7 days):   Results from last 7 days   Lab Units 08/22/23  0552   URINE CULTURE  >100,000 cfu/ml Escherichia coli*       Last 24 Hours Medication List:   Current Facility-Administered Medications   Medication Dose Route Frequency Provider Last Rate   • allopurinol  100 mg Oral Daily Mona BERNARDINO Pacheco     • amLODIPine  10 mg Oral Daily [facility] [place]     • aspirin  81 mg Oral Daily Caroline Gottron [place], [place]     • cephalexin  500 mg Oral [facility] Kellen Moise MD     • cinacalcet  30 mg Oral Every Other Day Mona BERNARDINO Pacheco     • [START ON 8/28/2023] cloNIDine  1 patch Transdermal Weekly Mona ShowBERNARDINO rodney     • heparin (porcine)  5,000 Units Subcutaneous [facility] Caroline Gottron [place], [place]     • insulin lispro  1-6 Units Subcutaneous TID [facility] Caroline Gottron [place], PA-C     • labetalol  300 mg Oral BID Mona Showers, BERNARDINO     • pravastatin  80 mg Oral Daily With [address] PA-C     • saccharomyces boulardii  250 mg Oral BID Hulan Spurling, MD     • ticagrelor  90 mg Oral [place], [place]     • traZODone  50 mg Oral HS [place], [place]          Today, Patient Was Seen By: Hulan Spurling, MD    **Please Note: This note may have been constructed using a voice recognition system. **

## 2023-08-24 NOTE — ASSESSMENT & PLAN NOTE
/75   Pulse 83   Temp 97.5 °F (36.4 °C)   Resp 18   Ht 4' 10" (1.473 m)   Wt 74.8 kg (165 lb)   LMP  (LMP Unknown)   SpO2 97%   BMI 34.49 kg/m²   · Stable, well contolled  · Cont current meds

## 2023-08-24 NOTE — PLAN OF CARE
Problem: PHYSICAL THERAPY ADULT  Goal: Performs mobility at highest level of function for planned discharge setting. See evaluation for individualized goals. Description: Treatment/Interventions: Functional transfer training, LE strengthening/ROM, Elevations, Therapeutic exercise, Endurance training, Cognitive reorientation, Patient/family training, Bed mobility, Gait training, Spoke to nursing, OT          See flowsheet documentation for full assessment, interventions and recommendations. Outcome: Progressing  Note: Prognosis: Good  Problem List: Decreased strength, Decreased endurance, Impaired balance, Decreased mobility, Decreased coordination, Decreased cognition, Impaired vision, Impaired sensation  Assessment: Chart reviewed. Patient was received seated at EOB in NAD and agreeable to PT session. Today's PT treatment session consisted of therapeutic activity for facilitation of transitional movements and safe performance of correct technique for sit to stand transfers, therapeutic exercise to increase lower extremity muscle strength, and gait training to promote safe and functional ambulation on level surfaces. In comparison to the previous session the patient has made progress as evident by ability to perform all functional mobility with assist of one. Pt was able to ambulate an increased distance with use of RW. When ambulating pt exhibits decreased right stance time, decreased right foot clearance, decreased kt, and decreased stride length. Pt requires verbal cues to stay inside the walker when ambulating and requires assist for walker management secondary to impaired vision. Pt tolerated therapeutic exercise well without complaints. Overall, patient tolerated today's session well and continues to be making progress towards achieving her STG's. Patient's prognosis for achieving their STG's is good as evident by pt's motivation and good family support.  PT intervention continues to be appropriate as the patient continues to be limited by decreased lower extremity strength, impaired balance, decreased endurance, gait deviations, and decreased functional mobility. Continue to recommend STR. PT to continue to see patient in order to address the deficits listed above and provide interventions consistent with the POC in order to achieve STG's and optimize the patient's independence with functional mobility. Barriers to Discharge: Inaccessible home environment, Decreased caregiver support     PT Discharge Recommendation: Post acute rehabilitation services    See flowsheet documentation for full assessment.

## 2023-08-24 NOTE — ASSESSMENT & PLAN NOTE
Lab Results   Component Value Date    HGBA1C 8.5 (H) 08/16/2023     · 169 this AM  · Hold home oral antihyperglycemics   · Correctional SSI  · Hypoglycemia protocol  · Diabetic diet

## 2023-08-24 NOTE — PHYSICAL THERAPY NOTE
Physical Therapy Treatment Note    Patient Name: Kaylee Hawk    Diagnosis: Weakness generalized [R53.1]  Weakness [R53.1]  Aphasia as late effect of cerebrovascular accident (CVA) [I69.320]     08/24/23 1038   PT Last Visit   PT Visit Date 08/24/23   Note Type   Note Type Treatment for insurance authorization   Pain Assessment   Pain Assessment Tool 0-10   Pain Score No Pain   Restrictions/Precautions   Weight Bearing Precautions Per Order No   Other Precautions Cognitive; Chair Alarm; Bed Alarm; Fall Risk;Visual impairment  (impaired vision R eye)   General   Chart Reviewed Yes   Response to Previous Treatment Patient with no complaints from previous session. Family/Caregiver Present No   Cognition   Overall Cognitive Status Impaired   Arousal/Participation Alert; Responsive; Cooperative   Attention Attends with cues to redirect   Orientation Level Oriented to person;Oriented to situation  (inconsistent to time and year with cues)   Memory Decreased recall of recent events   Following Commands Follows one step commands with increased time or repetition   Comments Pt agreeable to PT. Subjective   Subjective "Good."   Bed Mobility   Additional Comments Pt was received seated at EOB in NAD. Transfers   Sit to Stand 3  Moderate assistance   Additional items Assist x 1; Increased time required;Verbal cues   Stand to Sit 3  Moderate assistance   Additional items Assist x 1; Armrests; Increased time required;Verbal cues   Ambulation/Elevation   Gait pattern Decreased toe off;Decreased heel strike;Decreased hip extension; Excessively slow; Step to;Short stride;Decreased R stance; Improper Weight shift   Gait Assistance 3  Moderate assist   Additional items Assist x 1;Verbal cues   Assistive Device Rolling walker   Distance 15 feet   Balance   Static Sitting Fair +   Dynamic Sitting Fair   Static Standing Poor +   Dynamic Standing Poor   Ambulatory Poor   Endurance Deficit   Endurance Deficit Yes   Endurance Deficit Description decreased activity tolerance   Activity Tolerance   Activity Tolerance Patient tolerated treatment well   Nurse Made Aware ZULEYMA Metz made aware of session outcomes   Exercises   Hip Flexion Sitting;10 reps;AAROM; Right;AROM; Left   Hip Abduction Sitting;10 reps;AAROM; Right;AROM; Left  (long sitting)   Knee AROM Long Arc Quad Sitting;10 reps;AROM; Left;AAROM; Right   Ankle Pumps Sitting;20 reps;AROM; Bilateral   Assessment   Prognosis Good   Problem List Decreased strength;Decreased endurance; Impaired balance;Decreased mobility; Decreased coordination;Decreased cognition; Impaired vision; Impaired sensation   Assessment Chart reviewed. Patient was received seated at EOB in NAD and agreeable to PT session. Today's PT treatment session consisted of therapeutic activity for facilitation of transitional movements and safe performance of correct technique for sit to stand transfers, therapeutic exercise to increase lower extremity muscle strength, and gait training to promote safe and functional ambulation on level surfaces. In comparison to the previous session the patient has made progress as evident by ability to perform all functional mobility with assist of one. Pt was able to ambulate an increased distance with use of RW. When ambulating pt exhibits decreased right stance time, decreased right foot clearance, decreased kt, and decreased stride length. Pt requires verbal cues to stay inside the walker when ambulating and requires assist for walker management secondary to impaired vision. Pt tolerated therapeutic exercise well without complaints. Overall, patient tolerated today's session well and continues to be making progress towards achieving her STG's. Patient's prognosis for achieving their STG's is good as evident by pt's motivation and good family support.  PT intervention continues to be appropriate as the patient continues to be limited by decreased lower extremity strength, impaired balance, decreased endurance, gait deviations, and decreased functional mobility. Continue to recommend STR. PT to continue to see patient in order to address the deficits listed above and provide interventions consistent with the POC in order to achieve STG's and optimize the patient's independence with functional mobility. Barriers to Discharge Inaccessible home environment;Decreased caregiver support   Goals   STG Expiration Date 09/01/23   PT Treatment Day 2   Plan   Treatment/Interventions LE strengthening/ROM; Functional transfer training; Therapeutic exercise; Endurance training;Cognitive reorientation;Patient/family training;Bed mobility;Gait training;Spoke to nursing;OT   Progress Progressing toward goals   PT Frequency 3-5x/wk   Recommendation   PT Discharge Recommendation Post acute rehabilitation services   AM-PAC Basic Mobility Inpatient   Turning in Flat Bed Without Bedrails 3   Lying on Back to Sitting on Edge of Flat Bed Without Bedrails 3   Moving Bed to Chair 2   Standing Up From Chair Using Arms 2   Walk in Room 2   Climb 3-5 Stairs With Railing 1   Basic Mobility Inpatient Raw Score 13   Basic Mobility Standardized Score 33.99   Highest Level Of Mobility   JH-HLM Goal 4: Move to chair/commode   JH-HLM Achieved 6: Walk 10 steps or more   Education   Education Provided Mobility training;Home exercise program;Assistive device   Patient Reinforcement needed   End of Consult   Patient Position at End of Consult Bedside chair;Bed/Chair alarm activated; All needs within reach  (RN aware)     Anthony Smith PT, DPT    Time of PT treatment session: [id number]  25 minutes

## 2023-08-24 NOTE — PROGRESS NOTES
-- Patient:  -- MRN: [id number]  -- Aidin Request ID: [id number]  -- Level of care reserved:  -- Partner Reserved:  -- Clinical needs requested:  -- Geography searched: 40 miles around [phone]  -- Start of Service:  -- Request sent: 2:20pm EDT on 8/16/2023 by Zach Dennis  -- Partner reserved:  -- Choice list shared:

## 2023-08-25 VITALS
TEMPERATURE: 97.5 F | RESPIRATION RATE: 18 BRPM | HEIGHT: 58 IN | HEART RATE: 74 BPM | OXYGEN SATURATION: 97 % | WEIGHT: 165 LBS | BODY MASS INDEX: 34.63 KG/M2 | SYSTOLIC BLOOD PRESSURE: 130 MMHG | DIASTOLIC BLOOD PRESSURE: 78 MMHG

## 2023-08-25 LAB
GLUCOSE SERPL-MCNC: 157 MG/DL (ref 65–140)
GLUCOSE SERPL-MCNC: 180 MG/DL (ref 65–140)

## 2023-08-25 PROCEDURE — 99239 HOSP IP/OBS DSCHRG MGMT >30: CPT | Performed by: FAMILY MEDICINE

## 2023-08-25 PROCEDURE — 82948 REAGENT STRIP/BLOOD GLUCOSE: CPT

## 2023-08-25 RX ORDER — SACCHAROMYCES BOULARDII 250 MG
250 CAPSULE ORAL 2 TIMES DAILY
Qty: 6 CAPSULE | Refills: 0
Start: 2023-08-25 | End: 2023-08-28

## 2023-08-25 RX ORDER — CEPHALEXIN 500 MG/1
500 CAPSULE ORAL EVERY 8 HOURS SCHEDULED
Qty: 6 CAPSULE | Refills: 0
Start: 2023-08-25 | End: 2023-08-27

## 2023-08-25 RX ADMIN — Medication 250 MG: at 08:52

## 2023-08-25 RX ADMIN — HEPARIN SODIUM 5000 UNITS: 5000 INJECTION INTRAVENOUS; SUBCUTANEOUS at 05:00

## 2023-08-25 RX ADMIN — AMLODIPINE BESYLATE 10 MG: 10 TABLET ORAL at 08:52

## 2023-08-25 RX ADMIN — TICAGRELOR 90 MG: 90 TABLET ORAL at 08:52

## 2023-08-25 RX ADMIN — ALLOPURINOL 100 MG: 100 TABLET ORAL at 08:52

## 2023-08-25 RX ADMIN — LABETALOL HYDROCHLORIDE 300 MG: 100 TABLET, FILM COATED ORAL at 08:52

## 2023-08-25 RX ADMIN — CEPHALEXIN 500 MG: 500 CAPSULE ORAL at 05:00

## 2023-08-25 RX ADMIN — ASPIRIN 81 MG: 81 TABLET, CHEWABLE ORAL at 08:52

## 2023-08-25 RX ADMIN — CINACALCET 30 MG: 30 TABLET, FILM COATED ORAL at 08:52

## 2023-08-25 NOTE — PROGRESS NOTES
-- Patient:  -- MRN: [id number]  -- Aidin Request ID: [id number]  -- Level of care reserved:  -- Partner Reserved:  -- Clinical needs requested:  -- Geography searched: 20 miles around [phone]  -- Start of Service:  -- Request sent: 3:06pm EDT on 8/22/2023 by Cheri Crespo  -- Partner reserved:  -- Choice list shared: 2:04pm EDT on 8/23/2023 by Kendy Tena

## 2023-08-25 NOTE — ASSESSMENT & PLAN NOTE
Lab Results   Component Value Date    HGBA1C 8.5 (H) 08/16/2023     · Stable   · Hold home oral antihyperglycemics   · Correctional SSI  · Hypoglycemia protocol  · Diabetic diet

## 2023-08-25 NOTE — DISCHARGE SUMMARY
[address]  Discharge- Brijesh Mckeon 1945, 70 y.o. female MRN: [id number]  Unit/Bed#: [facility] Encounter: [id number]  Primary Care Provider: David Ron MD   Date and time admitted to hospital: 8/21/2023  9:03 PM    * Aphasia as late effect of cerebrovascular accident (CVA)  Assessment & Plan  · [facility], cont DAPT  · Recent discharge s/p acute punctate stroke in the left occipital and left MCA territory, cont DAPT  · Unable to care for herself at home/ADLs  · Family and patient wanting placement  · No new neuro symptoms  · Regular diet as per speech  · Previous hospitalizations imaging reviewed: 70% ICA stenosis b/l. OP vascular followup (discussed with the vascular AP Governor Bong, will schedule OP followup for the patient )    Acute cystitis  Assessment & Plan  · 2 more days of antibiotics  · UC growing GNR enteric like, likely ecoli  · Will treat with keflex    Stage 3b chronic kidney disease [facility])  Assessment & Plan  Lab Results   Component Value Date    EGFR 29 08/23/2023    EGFR 27 08/22/2023    EGFR 25 08/21/2023    CREATININE 1.65 (H) 08/23/2023    CREATININE 1.79 (H) 08/22/2023    CREATININE 1.91 (H) 08/21/2023     · stable  · Baseline around 1.6-1.8  · Avoid nephrotoxic agents  · Resume losartan at DC  · OP nephrology referral    Type 2 diabetes mellitus with diabetic peripheral angiopathy without gangrene [facility])  Assessment & Plan    Lab Results   Component Value Date    HGBA1C 8.5 (H) 08/16/2023     · Stable   · Hold home oral antihyperglycemics   · Correctional SSI  · Hypoglycemia protocol  · Diabetic diet     Obesity, morbid (HCC)  Assessment & Plan  · Recommend weight loss via healthy diet and exercise   · Counseled regarding this for >3 m    Hypertension  Assessment & Plan  /78   Pulse 74   Temp 97.5 °F (36.4 °C)   Resp 18   Ht 4' 10" (1.473 m)   Wt 74.8 kg (165 lb)   LMP  (LMP Unknown)   SpO2 97%   BMI 34.49 kg/m²   · Well controlled  · Resume losartan at DC  · Cont current meds      Medical Problems     Resolved Problems  Date Reviewed: 8/25/2023   None       Discharging Physician / Practitioner: Monet Escobar MD  PCP: Gideon Jose MD  Admission Date:   Admission Orders (From admission, onward)     Ordered        08/22/23 1520  Inpatient Admission  Once            08/21/23 2352  Place in Observation  Once                      Discharge Date: 08/25/23    Consultations During Hospital Stay:  · IP CONSULT TO CASE MANAGEMENT    Procedures Performed:   · none    Significant Findings / Test Results:   · none    Incidental Findings:   · none   Test Results Pending at Discharge (will require follow up):   · none     Outpatient Tests Requested:  · bmp    Complications:  none    Reason for Admission:  Aphasia and worsening weakness at home    Hospital Course:   Keyona Segura is a 70 y.o. female patient who originally presented to the hospital on 8/21/2023. Patient was recently hospitalized secondary to stroke and was recommended postacute rehab but patient refused and went home. At home she realized that she was weak and was not able to carry out her activities of daily living. As per her son she was so weak that she almost had a fall. She came back to the hospital and case management team was consulted for further placement. PT OT saw the patient. Vascular team was also curb sided as she was scheduled to have a procedure [place] for her carotid artery stenosis. As per my discussion with the vascular team because she is too close to the stroke they would postpone her intervention to the future. This has been relayed to the patient son. Plan to continue aspirin and Brilinta at discharge. Patient's creatinine has been chronically elevated, continue losartan at discharge was found to have acute cystitis with E. coli, continue Keflex for 2 more days at discharge to finish 5 days total.  Continue probiotics at discharge.   Patient continues to have expressive aphasia at discharge. Please see above list of diagnoses and related plan for additional information. Condition at Discharge: stable    Discharge Day Visit / Exam:   Subjective:  "upset with aphasia"  Vitals: Blood Pressure: 130/78 (08/25/23 0612)  Pulse: 74 (08/25/23 0612)  Temperature: 97.5 °F (36.4 °C) (08/25/23 0612)  Temp Source: Oral (08/22/23 2141)  Respirations: 18 (08/24/23 2250)  Height: 4' 10" (147.3 cm) (08/21/23 2001)  Weight - Scale: 74.8 kg (165 lb) (08/21/23 2001)  SpO2: 97 % (08/25/23 0612)  Exam:   Physical Exam General- Awake, alert and oriented x 3, looks comfortable  HEENT- Normocephalic, atraumatic, oral mucosa- moist  Neck- Supple, No carotid bruit, no JVD  CVS- Normal S1/ S2, Regular rate and rhythm, No murmur, No edema  Respiratory system- B/L clear breath sounds, no wheezing  Abdomen- Soft, Non distended, no tenderness, Bowel sound- present 4 quads  Genitourinary- No suprapubic tenderness, No CVA tenderness  Skin- No new bruise or rash  Musculoskeletal- No gross deformity  Psych- No acute psychosis  CNS- expressive aphasia noted, generalized weakness, no new focal neuro deficit    Discussion with Family: pts son has been kept updated. Discharge instructions/Information to patient and family:   See after visit summary for information provided to patient and family. Provisions for Follow-Up Care:  See after visit summary for information related to follow-up care and any pertinent home health orders. Disposition:   Other: [facility]    Planned Readmission: no     Discharge Statement:  I spent 45 minutes discharging the patient. This time was spent on the day of discharge. I had direct contact with the patient on the day of discharge. Greater than 50% of the total time was spent examining patient, answering all patient questions, arranging and discussing plan of care with patient as well as directly providing post-discharge instructions. Additional time then spent on discharge activities. Discharge Medications:  See after visit summary for reconciled discharge medications provided to patient and/or family.       **Please Note: This note may have been constructed using a voice recognition system**

## 2023-08-25 NOTE — CASE MANAGEMENT
Case Management Progress Note    Patient name Caroline Davis  Location /-01 MRN [id number]  : 1945 Date 2023       LOS (days): 2  Geometric Mean LOS (GMLOS) (days): 2.60  Days to GMLOS:0.4        OBJECTIVE:        Current admission status: Inpatient  Preferred Pharmacy:   [id number])  [address] [address]  Phone: [phone] Fax: [phone]    [id number] [address] [facility] [address] [facility] [facility] [place] [address]  Phone: [phone] Fax: [phone]    Primary Care Provider: Gillian Guerrero MD    Primary Insurance: [facility]  Secondary Insurance:     PROGRESS NOTE:    [facility] received Rosendo Lovett late day. [facility] [phone] was planning to update son to the details for admission. CM coordinated a 12 noon transport to [facility] for tomorrow . IDANIA Salomon updated to plan. Facility contacts in chart for Nursing report.

## 2023-08-25 NOTE — CASE MANAGEMENT
Case Management Discharge Planning Note    Patient name Ade Michelle  Location /-01 MRN [id number]  : 1945 Date 2023       Current Admission Date: 2023  Current Admission [facility] as late effect of cerebrovascular accident (CVA)   Patient Active Problem List    Diagnosis Date Noted   • Acute cystitis 2023   • Aphasia as late effect of cerebrovascular accident (CVA) 2023   • Dysarthria 08/15/2023   • Stage 3b chronic kidney disease ([address])    • Hypertensive urgency 2023   • Aortoiliac occlusive disease ([address]) 10/11/2022   • History of gastrointestinal stromal tumor (GIST) 2022   • Secondary hyperparathyroidism of renal origin ([address]) 2022   • 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   • Diabetes ([address]) 2021   • Type 2 diabetes mellitus with diabetic peripheral angiopathy without gangrene ([address]) 2021   • Embolism and 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 ([address]) 2021   • Depression, recurrent ([address]) 2021   • Obesity, morbid ([address]) 2021   • Stage 4 chronic kidney disease ([address]) 2020   • Hypertensive kidney disease with stage 4 chronic kidney disease ([address]) 2020   • Cervical radiculopathy 2020   • Malignant neoplasm of overlapping sites of bladder ([address]) 2020   • Stenosis of left anterior descending artery Mid LAD 50-60% stenosis 2020   • 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  2020   • Mesenteric artery stenosis ([address]) 2020   • Positive cardiac stress test  small, mildly severe, partially reversible myocardial perfusion defect of anterior and inferior wall  11/12/2019   • Abnormal CT of the chest suspicious for an infectious or inflammatory bronchiolitis.  11/07/2019   • Celiac artery stenosis (HCC) >70% stenosis in the celiac trunk 11/05/2019   • Median arcuate ligament syndrome ([address]) 11/05/2019   • Atherosclerosis of renal artery ([address]) 07/01/2019   • Aortoiliac stenosis, left (HCC) 02/25/2019   • Spondylosis of lumbar region without myelopathy or radiculopathy 01/29/2019   • Lumbosacral spondylosis without myelopathy 01/29/2019   • Statin intolerance 01/22/2019   • Lumbar disc herniation 01/22/2019   • Herniated lumbar intervertebral disc 01/22/2019   • Chronic GERD 12/04/2017   • Stage 3 chronic kidney disease ([address]) 12/04/2017   • Claudication in peripheral vascular disease ([address]) 12/04/2017   • Gout 12/04/2017   • Hx-TIA (transient ischemic attack) 12/04/2017   • Hyperlipidemia associated with type 2 diabetes mellitus ([address]) 12/04/2017   • Hypertension associated with diabetes ([address]) 12/04/2017   • Osteoarthritis 12/04/2017   • Obesity (BMI 30-39.9) 12/04/2017   • Right renal artery stenosis ([address]) 12/04/2017   • 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   • Hyperlipidemia, unspecified 12/04/2017   • Vitamin D deficiency 09/08/2016   • Basilar artery stenosis 06/22/2016   • Type 2 diabetes mellitus with diabetic nephropathy ([address]) 12/19/2015   • Hypercholesteremia 12/19/2015   • Hypertension 12/19/2015   • Polyneuropathy 12/19/2015   • CVA (cerebral vascular accident) ([address]) 11/09/2015      LOS (days): 3  Geometric Mean LOS (GMLOS) (days): 2.60  Days to GMLOS:-0.2     OBJECTIVE:  Risk of Unplanned Readmission Score: 20.14      Current admission status: Inpatient   Preferred Pharmacy:   [address] [place] - [phone] [facility])  [address] [place] [address]  Phone: [phone] Fax: [phone]    [id number] - [facility] [address] - [address] [facility] [place] [address]  Phone: [phone] Fax: [phone]    Primary Care Provider: Hany Patterson MD    Primary Insurance: [facility]  Secondary Insurance:     DISCHARGE DETAILS:    Discharge planning discussed with[de-identified] Patient at bedside. Freedom of Choice: Yes  Comments - Freedom of Choice: FOC maintained - DCP for today reviewed. Patient in agreement with plan. Glad to be moving to next level of care. CM contacted family/caregiver?: Yes (Son, Ailyn Zheng, vis phone.)  Were Treatment Team discharge recommendations reviewed with patient/caregiver?: Yes (As it pertains to d/c planning and CM role.)  Did patient/caregiver verbalize understanding of patient care needs?: Yes (As it pertains to d/c planning and CM role.)  Were patient/caregiver advised of the risks associated with not following Treatment Team discharge recommendations?: Yes (As it pertains to d/c planning and CM role.)    Contacts  Patient Contacts: Ailyn Zheng (son)  Relationship to Patient[de-identified] Family  Contact Method: Phone  Phone Number: [phone]  Reason/Outcome: Continuity of Care, Discharge [facility]         Is the patient interested in [facility] at discharge?: No    DME Referral Provided  Referral made for DME?: No    Other Referral/Resources/Interventions Provided:  Interventions: Acute Rehab, Transportation  Referral Comments: CM confirmed [facility] admission today and [facility] transport. Would you like to participate in our [facility] service program?  : No - Declined    Treatment Team Recommendation: Acute Rehab  Discharge Destination Plan[de-identified] Acute Rehab  Transport at Discharge : [facility]  Dispatcher Contacted: Yes  Number/Name of Dispatcher: Blaze Moseley [phone]  Transported by [employer] and Unit #):  ZEESHAN [phone]  ETA of Transport (Date): 08/25/23  ETA of Transport (Time): 1200     Transfer Mode: Stretcher  Accompanied by: EMS personnel     IMM Given (Date):: 08/25/23  IMM Given to[de-identified] Patient     Additional Comments: CM reviewed IMM and Medicare rights as they pertain to d/c planning. Patient in agreement with current DCP. Reports understanding of right with no additional questions or concerns. Original to medical records. Patient copy provided at bedside. Accepting Facility Name, [address] [place] [address] : [address] [place] [address], [facility]  Receiving Facility/Agency Phone Number: Phone: [phone]  Facility/Agency Fax Number: Fax: [phone]       Phone for report is [phone], fax is [phone] - facility is requesting call with report [place] [facility]

## 2023-08-25 NOTE — PLAN OF CARE
Problem: Potential for Falls  Goal: Patient will rmain free of falls  Description: INTERVENTIONS:  - Educate patient/family on patient safety including physical limitations  - Instruct patient to call for assistance with activity   - Consult OT/PT to assist with strengthening/mobility   - Keep Call bell within reach  - Keep bed low and locked with side rails adjusted as appropriate  - Keep care items and personal belongings within reach  - Initiate and maintain comfort rounds  - Make Fall Risk Sign visible to staff  - Offer Toileting every  Hours, in advance of need  - Initiate/Maintain alarm  - Obtain necessary fall risk management equipment  - Apply yellow socks and bracelet for high fall risk patients  - Consider moving patient to room near nurses station  Outcome: Progressing

## 2023-08-25 NOTE — ASSESSMENT & PLAN NOTE
/78   Pulse 74   Temp 97.5 °F (36.4 °C)   Resp 18   Ht 4' 10" (1.473 m)   Wt 74.8 kg (165 lb)   LMP  (LMP Unknown)   SpO2 97%   BMI 34.49 kg/m²   · Well controlled  · Resume losartan at DC  · Cont current meds

## 2023-08-25 NOTE — ASSESSMENT & PLAN NOTE
Lab Results   Component Value Date    EGFR 29 08/23/2023    EGFR 27 08/22/2023    EGFR 25 08/21/2023    CREATININE 1.65 (H) 08/23/2023    CREATININE 1.79 (H) 08/22/2023    CREATININE 1.91 (H) 08/21/2023     · stable  · Baseline around 1.6-1.8  · Avoid nephrotoxic agents  · Resume losartan at DC  · OP nephrology referral

## 2023-08-25 NOTE — ASSESSMENT & PLAN NOTE
· For DC today [facility], cont DAPT  · Recent discharge s/p acute punctate stroke in the left occipital and left MCA territory, cont DAPT  · Unable to care for herself at home/ADLs  · Family and patient wanting placement  · No new neuro symptoms  · Regular diet as per speech  · Previous hospitalizations imaging reviewed: 70% ICA stenosis b/l. OP vascular followup (discussed with the vascular AP Kya, will schedule OP followup for the patient )

## 2023-09-02 DIAGNOSIS — E11.21 TYPE 2 DIABETES MELLITUS WITH DIABETIC NEPHROPATHY, WITHOUT LONG-TERM CURRENT USE OF INSULIN (HCC): ICD-10-CM

## 2023-09-03 ENCOUNTER — APPOINTMENT (OUTPATIENT)
Dept: RADIOLOGY | Facility: HOSPITAL | Age: 78
DRG: 034 | End: 2023-09-03
Payer: COMMERCIAL

## 2023-09-03 ENCOUNTER — HOSPITAL ENCOUNTER (INPATIENT)
Facility: HOSPITAL | Age: 78
LOS: 6 days | DRG: 034 | End: 2023-09-09
Attending: EMERGENCY MEDICINE | Admitting: INTERNAL MEDICINE
Payer: COMMERCIAL

## 2023-09-03 DIAGNOSIS — I63.9 ACUTE CVA (CEREBROVASCULAR ACCIDENT) (HCC): ICD-10-CM

## 2023-09-03 DIAGNOSIS — R29.90 STROKE-LIKE SYMPTOMS: ICD-10-CM

## 2023-09-03 DIAGNOSIS — I65.23 INTERNAL CAROTID ARTERY STENOSIS, BILATERAL: ICD-10-CM

## 2023-09-03 DIAGNOSIS — I69.320 APHASIA AS LATE EFFECT OF CEREBROVASCULAR ACCIDENT (CVA): ICD-10-CM

## 2023-09-03 DIAGNOSIS — I63.9 CEREBROVASCULAR ACCIDENT (CVA), UNSPECIFIED MECHANISM (HCC): ICD-10-CM

## 2023-09-03 DIAGNOSIS — N18.4 STAGE 4 CHRONIC KIDNEY DISEASE (HCC): ICD-10-CM

## 2023-09-03 DIAGNOSIS — M10.272 ACUTE DRUG-INDUCED GOUT OF LEFT FOOT: ICD-10-CM

## 2023-09-03 DIAGNOSIS — E11.65 TYPE 2 DIABETES MELLITUS WITH HYPERGLYCEMIA, WITHOUT LONG-TERM CURRENT USE OF INSULIN (HCC): ICD-10-CM

## 2023-09-03 DIAGNOSIS — I10 PRIMARY HYPERTENSION: Primary | ICD-10-CM

## 2023-09-03 DIAGNOSIS — I63.232 CEREBROVASCULAR ACCIDENT (CVA) DUE TO STENOSIS OF LEFT CAROTID ARTERY (HCC): ICD-10-CM

## 2023-09-03 LAB
2HR DELTA HS TROPONIN: 0 NG/L
4HR DELTA HS TROPONIN: 1 NG/L
ANION GAP SERPL CALCULATED.3IONS-SCNC: 6 MMOL/L
APTT PPP: 34 SECONDS (ref 23–37)
ATRIAL RATE: 82 BPM
BUN SERPL-MCNC: 65 MG/DL (ref 5–25)
CALCIUM SERPL-MCNC: 8.9 MG/DL (ref 8.4–10.2)
CARDIAC TROPONIN I PNL SERPL HS: 6 NG/L
CARDIAC TROPONIN I PNL SERPL HS: 6 NG/L
CARDIAC TROPONIN I PNL SERPL HS: 7 NG/L
CHLORIDE SERPL-SCNC: 100 MMOL/L (ref 96–108)
CHOLEST SERPL-MCNC: 144 MG/DL
CO2 SERPL-SCNC: 24 MMOL/L (ref 21–32)
CREAT SERPL-MCNC: 1.93 MG/DL (ref 0.6–1.3)
ERYTHROCYTE [DISTWIDTH] IN BLOOD BY AUTOMATED COUNT: 15.4 % (ref 11.6–15.1)
GFR SERPL CREATININE-BSD FRML MDRD: 24 ML/MIN/1.73SQ M
GLUCOSE SERPL-MCNC: 119 MG/DL (ref 65–140)
GLUCOSE SERPL-MCNC: 134 MG/DL (ref 65–140)
GLUCOSE SERPL-MCNC: 135 MG/DL (ref 65–140)
GLUCOSE SERPL-MCNC: 137 MG/DL (ref 65–140)
GLUCOSE SERPL-MCNC: 194 MG/DL (ref 65–140)
HCT VFR BLD AUTO: 34.7 % (ref 34.8–46.1)
HDLC SERPL-MCNC: 52 MG/DL
HGB BLD-MCNC: 11.7 G/DL (ref 11.5–15.4)
INR PPP: 0.98 (ref 0.84–1.19)
LDLC SERPL CALC-MCNC: 59 MG/DL (ref 0–100)
MCH RBC QN AUTO: 31.1 PG (ref 26.8–34.3)
MCHC RBC AUTO-ENTMCNC: 33.7 G/DL (ref 31.4–37.4)
MCV RBC AUTO: 92 FL (ref 82–98)
NONHDLC SERPL-MCNC: 92 MG/DL
P AXIS: 78 DEGREES
PLATELET # BLD AUTO: 267 THOUSANDS/UL (ref 149–390)
PLATELET # BLD AUTO: 287 THOUSANDS/UL (ref 149–390)
PMV BLD AUTO: 10.2 FL (ref 8.9–12.7)
PMV BLD AUTO: 10.4 FL (ref 8.9–12.7)
POTASSIUM SERPL-SCNC: 4.2 MMOL/L (ref 3.5–5.3)
PR INTERVAL: 206 MS
PROTHROMBIN TIME: 13.2 SECONDS (ref 11.6–14.5)
QRS AXIS: -16 DEGREES
QRSD INTERVAL: 88 MS
QT INTERVAL: 378 MS
QTC INTERVAL: 427 MS
RBC # BLD AUTO: 3.76 MILLION/UL (ref 3.81–5.12)
SODIUM SERPL-SCNC: 130 MMOL/L (ref 135–147)
T WAVE AXIS: 113 DEGREES
TRIGL SERPL-MCNC: 164 MG/DL
VENTRICULAR RATE: 77 BPM
WBC # BLD AUTO: 6.7 THOUSAND/UL (ref 4.31–10.16)

## 2023-09-03 PROCEDURE — 87081 CULTURE SCREEN ONLY: CPT | Performed by: INTERNAL MEDICINE

## 2023-09-03 PROCEDURE — 85049 AUTOMATED PLATELET COUNT: CPT | Performed by: INTERNAL MEDICINE

## 2023-09-03 PROCEDURE — 99285 EMERGENCY DEPT VISIT HI MDM: CPT

## 2023-09-03 PROCEDURE — 84484 ASSAY OF TROPONIN QUANT: CPT | Performed by: EMERGENCY MEDICINE

## 2023-09-03 PROCEDURE — 80061 LIPID PANEL: CPT | Performed by: INTERNAL MEDICINE

## 2023-09-03 PROCEDURE — 80048 BASIC METABOLIC PNL TOTAL CA: CPT | Performed by: EMERGENCY MEDICINE

## 2023-09-03 PROCEDURE — 99223 1ST HOSP IP/OBS HIGH 75: CPT | Performed by: INTERNAL MEDICINE

## 2023-09-03 PROCEDURE — 82948 REAGENT STRIP/BLOOD GLUCOSE: CPT

## 2023-09-03 PROCEDURE — 83036 HEMOGLOBIN GLYCOSYLATED A1C: CPT | Performed by: INTERNAL MEDICINE

## 2023-09-03 PROCEDURE — 85027 COMPLETE CBC AUTOMATED: CPT | Performed by: EMERGENCY MEDICINE

## 2023-09-03 PROCEDURE — 99291 CRITICAL CARE FIRST HOUR: CPT | Performed by: PSYCHIATRY & NEUROLOGY

## 2023-09-03 PROCEDURE — 99285 EMERGENCY DEPT VISIT HI MDM: CPT | Performed by: EMERGENCY MEDICINE

## 2023-09-03 PROCEDURE — 70496 CT ANGIOGRAPHY HEAD: CPT

## 2023-09-03 PROCEDURE — 93005 ELECTROCARDIOGRAM TRACING: CPT

## 2023-09-03 PROCEDURE — 70498 CT ANGIOGRAPHY NECK: CPT

## 2023-09-03 PROCEDURE — 85610 PROTHROMBIN TIME: CPT | Performed by: EMERGENCY MEDICINE

## 2023-09-03 PROCEDURE — 93010 ELECTROCARDIOGRAM REPORT: CPT | Performed by: INTERNAL MEDICINE

## 2023-09-03 PROCEDURE — 85730 THROMBOPLASTIN TIME PARTIAL: CPT | Performed by: EMERGENCY MEDICINE

## 2023-09-03 PROCEDURE — 36415 COLL VENOUS BLD VENIPUNCTURE: CPT | Performed by: EMERGENCY MEDICINE

## 2023-09-03 RX ORDER — ACETAMINOPHEN 500 MG
650 TABLET ORAL EVERY 6 HOURS PRN
COMMUNITY

## 2023-09-03 RX ORDER — ONDANSETRON 2 MG/ML
4 INJECTION INTRAMUSCULAR; INTRAVENOUS EVERY 6 HOURS PRN
Status: DISCONTINUED | OUTPATIENT
Start: 2023-09-03 | End: 2023-09-09 | Stop reason: HOSPADM

## 2023-09-03 RX ORDER — HEPARIN SODIUM 5000 [USP'U]/ML
5000 INJECTION, SOLUTION INTRAVENOUS; SUBCUTANEOUS EVERY 8 HOURS SCHEDULED
Status: DISCONTINUED | OUTPATIENT
Start: 2023-09-03 | End: 2023-09-03

## 2023-09-03 RX ORDER — ALLOPURINOL 100 MG/1
100 TABLET ORAL DAILY
Status: DISCONTINUED | OUTPATIENT
Start: 2023-09-04 | End: 2023-09-07

## 2023-09-03 RX ORDER — PRAVASTATIN SODIUM 80 MG/1
80 TABLET ORAL
Status: DISCONTINUED | OUTPATIENT
Start: 2023-09-03 | End: 2023-09-09 | Stop reason: HOSPADM

## 2023-09-03 RX ORDER — CINACALCET 30 MG/1
30 TABLET, FILM COATED ORAL EVERY OTHER DAY
Status: DISCONTINUED | OUTPATIENT
Start: 2023-09-04 | End: 2023-09-09 | Stop reason: HOSPADM

## 2023-09-03 RX ORDER — CLONIDINE 0.3 MG/24H
1 PATCH, EXTENDED RELEASE TRANSDERMAL WEEKLY
Status: DISCONTINUED | OUTPATIENT
Start: 2023-09-09 | End: 2023-09-07

## 2023-09-03 RX ORDER — ASPIRIN 81 MG/1
81 TABLET, CHEWABLE ORAL DAILY
COMMUNITY

## 2023-09-03 RX ORDER — AMLODIPINE BESYLATE 2.5 MG/1
2.5 TABLET ORAL DAILY
Status: DISCONTINUED | OUTPATIENT
Start: 2023-09-04 | End: 2023-09-03

## 2023-09-03 RX ORDER — INSULIN LISPRO 100 [IU]/ML
INJECTION, SOLUTION INTRAVENOUS; SUBCUTANEOUS
COMMUNITY

## 2023-09-03 RX ORDER — HEPARIN SODIUM 5000 [USP'U]/ML
5000 INJECTION, SOLUTION INTRAVENOUS; SUBCUTANEOUS EVERY 8 HOURS SCHEDULED
COMMUNITY
End: 2023-09-09

## 2023-09-03 RX ORDER — POLYETHYLENE GLYCOL 3350 17 G/17G
17 POWDER, FOR SOLUTION ORAL DAILY
Status: DISCONTINUED | OUTPATIENT
Start: 2023-09-04 | End: 2023-09-09 | Stop reason: HOSPADM

## 2023-09-03 RX ORDER — HEPARIN SODIUM 5000 [USP'U]/ML
5000 INJECTION, SOLUTION INTRAVENOUS; SUBCUTANEOUS EVERY 8 HOURS SCHEDULED
Status: DISCONTINUED | OUTPATIENT
Start: 2023-09-03 | End: 2023-09-09 | Stop reason: HOSPADM

## 2023-09-03 RX ORDER — INSULIN LISPRO 100 [IU]/ML
3 INJECTION, SOLUTION INTRAVENOUS; SUBCUTANEOUS
Status: DISCONTINUED | OUTPATIENT
Start: 2023-09-03 | End: 2023-09-09

## 2023-09-03 RX ORDER — LABETALOL 100 MG/1
100 TABLET, FILM COATED ORAL 2 TIMES DAILY
Status: DISCONTINUED | OUTPATIENT
Start: 2023-09-03 | End: 2023-09-03

## 2023-09-03 RX ORDER — ASPIRIN 81 MG/1
81 TABLET, CHEWABLE ORAL DAILY
Status: DISCONTINUED | OUTPATIENT
Start: 2023-09-04 | End: 2023-09-03

## 2023-09-03 RX ORDER — ASPIRIN 81 MG/1
81 TABLET, CHEWABLE ORAL DAILY
Status: DISCONTINUED | OUTPATIENT
Start: 2023-09-04 | End: 2023-09-09 | Stop reason: HOSPADM

## 2023-09-03 RX ORDER — MAGNESIUM HYDROXIDE/ALUMINUM HYDROXICE/SIMETHICONE 120; 1200; 1200 MG/30ML; MG/30ML; MG/30ML
30 SUSPENSION ORAL EVERY 6 HOURS PRN
Status: DISCONTINUED | OUTPATIENT
Start: 2023-09-03 | End: 2023-09-09 | Stop reason: HOSPADM

## 2023-09-03 RX ORDER — LIDOCAINE 50 MG/G
1 PATCH TOPICAL DAILY
COMMUNITY

## 2023-09-03 RX ORDER — LIDOCAINE 50 MG/G
2 PATCH TOPICAL DAILY
Status: DISCONTINUED | OUTPATIENT
Start: 2023-09-03 | End: 2023-09-09 | Stop reason: HOSPADM

## 2023-09-03 RX ORDER — INSULIN GLARGINE 100 [IU]/ML
12 INJECTION, SOLUTION SUBCUTANEOUS
Status: DISCONTINUED | OUTPATIENT
Start: 2023-09-03 | End: 2023-09-05

## 2023-09-03 RX ORDER — TRAZODONE HYDROCHLORIDE 50 MG/1
50 TABLET ORAL
Status: DISCONTINUED | OUTPATIENT
Start: 2023-09-03 | End: 2023-09-09 | Stop reason: HOSPADM

## 2023-09-03 RX ORDER — ZINC OXIDE
OINTMENT (GRAM) TOPICAL 2 TIMES DAILY PRN
Status: DISCONTINUED | OUTPATIENT
Start: 2023-09-03 | End: 2023-09-03 | Stop reason: RX

## 2023-09-03 RX ADMIN — LIDOCAINE 2 PATCH: 700 PATCH TOPICAL at 13:35

## 2023-09-03 RX ADMIN — INSULIN GLARGINE 12 UNITS: 100 INJECTION, SOLUTION SUBCUTANEOUS at 22:10

## 2023-09-03 RX ADMIN — INSULIN LISPRO 3 UNITS: 100 INJECTION, SOLUTION INTRAVENOUS; SUBCUTANEOUS at 19:11

## 2023-09-03 RX ADMIN — TRAZODONE HYDROCHLORIDE 50 MG: 50 TABLET ORAL at 22:09

## 2023-09-03 RX ADMIN — PRAVASTATIN SODIUM 80 MG: 80 TABLET ORAL at 17:51

## 2023-09-03 RX ADMIN — HEPARIN SODIUM 5000 UNITS: 5000 INJECTION INTRAVENOUS; SUBCUTANEOUS at 22:10

## 2023-09-03 RX ADMIN — TICAGRELOR 90 MG: 90 TABLET ORAL at 22:09

## 2023-09-03 RX ADMIN — IOHEXOL 85 ML: 350 INJECTION, SOLUTION INTRAVENOUS at 13:07

## 2023-09-03 RX ADMIN — HEPARIN SODIUM 5000 UNITS: 5000 INJECTION INTRAVENOUS; SUBCUTANEOUS at 17:51

## 2023-09-03 NOTE — ASSESSMENT & PLAN NOTE
Lab Results   Component Value Date    EGFR 24 09/03/2023    EGFR 29 08/23/2023    EGFR 27 08/22/2023    CREATININE 1.93 (H) 09/03/2023    CREATININE 1.65 (H) 08/23/2023    CREATININE 1.79 (H) 08/22/2023     Monitor kidney function  Avoid nephrotoxins

## 2023-09-03 NOTE — CONSULTS
Consultation - Stroke   John Swain 70 y.o. female MRN: [id number]  Unit/Bed#: ED 19 Encounter: [id number]      Assessment/Plan     Stroke-like symptoms  Assessment & Plan  79YO R-handed female with history of HTN, HLD, CKD, recent acute CVA of L occipital lobe and L MCA territory with residual expressive aphasia and R hemianopia (8/15/2023), advanced atherosclerotic disease affecting multiple neurovascular structures who presented as a stroke alert on 9/3/2023 for acute RLE and RUE weakness. This occurred while she was working with PT at [employer]. Denied headache, dizziness, numbness/tingling. Initial NIHSS 8 - inability to accurately state month and age, R hemianopia, RLE weakness, RLE decreased sensation, moderate expressive aphasia. Patient has been on ASA/Brilinta since 7/29/2023 for CVA. No TNK administered given complete resolution of acute symptoms. CTH showed stable subacute infarcts within the L occipital and parietal lobes. CTA showed stable advanced atherosclerotic disease throughout cervical and intracranial vasculature with multiple areas of severe stenosis most prominent within the ICA bulb bilaterally; no significant change compared with prior examination. Patient was recently scheduled for TCAR with vascular surgery but this was canceled due to elevated A1c level. Admitted on stroke pathway. Prior work-up:  -Echo, 8/18/23: EF 62%. G1DD, LA normal size, no major valvular dysfunction. Lab Results   Component Value Date    HGBA1C 8.5 (H) 08/16/2023    LDLCALC 79 08/16/2023       Impression: Concern for recrudescence while working with PT (possible orthostatic hypotension or fatigue) vs TIA/CVA. Plan, Stroke Pathway:  · Discussed with attending Dr. Juan Prater  · MRI brain   · Continue home ASA/Brilinta  · Continue home Pravastatin 80mg daily with dinner  · Permissive hypertension for 24-48 hours <220/110  · Normoglycemia, normothermia.   · Monitor on telemetry  · Bedside swallow eval  · Stat CT Head for change in neuro status. · DVT ppx, SCDs  • PT/OT/PM&R evaluations   • Medical management as per primary team appreciated. Thrombolytic Decision: Patient not a candidate. Symptoms resolved/clearly non disabling. Juice Mcclellan will need follow up in in 6 weeks with neurovascular attending or advance practitioner. She will not require outpatient neurological testing. History of Present Illness     Reason for Consult / Principal Problem: RUE and RLE weakness  Hx and PE limited by: expressive aphasia  Patient last known well: 9/3/2023 10:30am  Stroke alert called: 12:35pm  Neurology time of arrival: 12:36pm  HPI: Juice Mcclellan is a 70 y.o. right handed female with HTN, HLD, CKD, recent acute CVA of L occipital lobe and L MCA territory with residual expressive aphasia on 8/15/2023, advanced atherosclerotic disease affecting multiple neurovascular structures who presents from [address] as a stroke alert for acute worsening of RUE and RLE. LKW 9/3/2023 10:30am. POA /94, glucose 176. Currently on ASA/Brilinta (since 7/29/23 Brilinta, 7/28/23 ASA). She was working with PT when she began experiencing acute weakening of RUE and RLE. Denied any other symptoms including headache, dizziness, nausea, worsening of speech, numbness/tingling. Initial NIHSS 8 for unable to accurately state month and age, R hemianopia, RLE weakness, RLE decreased sensation, moderate aphasia. CTH demonstrated stable subacute infarcts within the L occipital and parietal lobes, no mass effect or hemorrhagic transformation, no hemorrhage.  CTA showed stable advanced atherosclerotic disease throughout the cervical and intracranial vasculature with multiple areas of severe stenosis most prominent within the internal carotid artery bulb bilaterally; advanced atherosclerotic disease of the distal left vertebral artery with occlusion proximal to the vertebrobasilar junction and severe high-grade stenosis of the proximal basilar. Additional areas of moderate to advanced atherosclerotic disease scattered throughout the cervical and intracranial vasculature. Overall no significant change when compared with prior examination. Patient was recently scheduled for TCAR with vascular surgery but was canceled due to concern for elevated A1c level. Patient demonstrated rapid improvement of symptoms and by the time she arrived in ED room after leaving CT scanner, patient was back to her baseline, which was confirmed by her son at bedtime. Given complete resolution of her symptoms, no TNK was administered. She will be placed on stroke pathway and Neurology team will continue to follow her care. Inpatient consult to Neurology  Consult performed by: Tanja Nunes DO  Consult ordered by: Bing Levine MD          Review of Systems   Constitutional: Negative for appetite change, chills, diaphoresis, fatigue and fever. HENT: Negative for trouble swallowing. Respiratory: Negative for shortness of breath. Cardiovascular: Negative for chest pain. Gastrointestinal: Negative for abdominal pain. Musculoskeletal: Positive for back pain. Neurological: Positive for speech difficulty and weakness. Negative for dizziness, seizures, facial asymmetry, light-headedness, numbness and headaches. All other systems reviewed and are negative.       Historical Information   Past Medical History:   Diagnosis Date   • Arthritis    • Chronic kidney disease    • Diabetes mellitus ([address])    • Endometriosis    • High cholesterol    • Hypertension    • Kidney disease, chronic, stage III (moderate, EGFR 30-59 ml/min) ([facility])    • Lumbar disc herniation    • Neuropathy    • Peripheral vascular disease ([address])    • Pneumonia    • Shoulder injury     left   • Spinal stenosis    • Stroke ([address]) 2015    Memory loss     Past Surgical History:   Procedure Laterality Date   • CARDIAC CATHETERIZATION     • COLONOSCOPY     • FL RETROGRADE PYELOGRAM  4/6/2020   • FRACTURE SURGERY Right     ankle   • OVARIAN CYST SURGERY     • WV CYSTO BLADDER W/URETERAL CATHETERIZATION Bilateral 4/6/2020    Procedure: CYSTOSCOPY WITH RETROGRADE PYELOGRAM;  Surgeon: Guerrero Alexander MD;  [facility];  Service: Urology   • WV CYSTO W/INSERT URETERAL STENT Right 4/6/2020    Procedure: INSERTION STENT URETERAL;  Surgeon: Guerrero Alexander MD;  [facility];  Service: Urology   • WV CYSTO W/REMOVAL OF TUMORS SMALL N/A 4/6/2020    Procedure: TRANSURETHRAL RESECTION OF BLADDER TUMOR (TURBT); Surgeon: Guerrero Alexander MD;  [facility];  Service: Urology   • ROTATOR CUFF REPAIR Left    • TONSILLECTOMY       Social History   Social History     Substance and Sexual Activity   Alcohol Use Never     Social History     Substance and Sexual Activity   Drug Use Never     E-Cigarette/Vaping   • E-Cigarette Use Never User      E-Cigarette/Vaping Substances   • Nicotine No    • THC No    • CBD No    • Flavoring No    • Other No    • Unknown No      Social History     Tobacco Use   Smoking Status Former   • Packs/day: 2.00   • Years: 40.00   • Total pack years: 80.00   • Types: Cigarettes   • Start date: 1966   • Quit date: 7/27/2020   • Years since quitting: 3.1   Smokeless Tobacco Never     Family History:   Family History   Problem Relation Age of Onset   • Hypertension Mother    • Heart disease Mother         Valvular   • Hyperlipidemia Mother    • Hypertension Father    • Heart defect Father         Cardiomegaly   • Stroke Sister         Cerebrovascular Accident   • Arthritis Brother    • Other Brother         Back Disorder       Review of previous medical records was completed. Meds/Allergies   PTA meds:   Prior to Admission Medications   Prescriptions Last Dose Informant Patient Reported? Taking? Alcohol Swabs 70 % PADS  Self, Child No No   Sig: May substitute brand based on insurance coverage. Check glucose ACHS.    Patient not taking: Reported on 8/22/2023   Blood Glucose Monitoring Suppl (OneTouch Verio Reflect) w/Device KIT  Self, Child No No   Sig: May substitute brand based on insurance coverage. Check glucose ACHS. Omega-3 Fatty Acids (fish oil) 1,000 mg  Self, Child No No   Sig: Take 1 capsule (1,000 mg total) by mouth daily Do not start before July 30, 2023. OneTouch Delica Lancets 30Y MISC  Self, Child No No   Sig: May substitute brand based on insurance coverage. Check glucose ACHS. allopurinol (ZYLOPRIM) 100 mg tablet  Self, Child No No   Sig: TAKE 1 TABLET BY MOUTH EVERY DAY   amLODIPine (NORVASC) 10 mg tablet  Self, Child No No   Sig: Take 1 tablet (10 mg total) by mouth daily   aspirin 81 mg chewable tablet   No No   Sig: Chew 1 tablet (81 mg total) daily   cinacalcet (SENSIPAR) 30 mg tablet   No No   Sig: Take 1 tablet (30 mg total) by mouth every other day   cloNIDine (CATAPRES-TTS-3) 0.3 mg/24 hr  Self, Child No No   Sig: PLACE 1 PATCH (0.3 MG TOTAL) ON THE SKIN ONCE A WEEK   glipiZIDE (GLUCOTROL) 5 mg tablet   No No   Sig: Take 1 tablet (5 mg total) by mouth daily with breakfast   glucose blood (OneTouch Verio) test strip  Self, Child No No   Sig: May substitute brand based on insurance coverage. Check glucose ACHS.    labetalol (NORMODYNE) 300 mg tablet  Self, Child No No   Sig: Take 1 tablet (300 mg total) by mouth 2 (two) times a day   losartan (COZAAR) 100 MG tablet  Self, Child No No   Sig: Take 1 tablet (100 mg total) by mouth daily   pravastatin (PRAVACHOL) 80 mg tablet   No No   Sig: Take 1 tablet (80 mg total) by mouth daily with dinner   ticagrelor (BRILINTA) 90 MG   No No   Sig: Take 1 tablet (90 mg total) by mouth every 12 (twelve) hours   traZODone (DESYREL) 50 mg tablet   No No   Sig: Take 1 tablet (50 mg total) by mouth daily at bedtime   zinc oxide 20 % ointment  Self, Child No No   Sig: Apply topically as needed for irritation      Facility-Administered Medications: None       Allergies   Allergen Reactions • Fenofibrate Other (See Comments)      blood in urine  hx  Kidney Failure   • Colesevelam Other (See Comments)      leg pains   • Colestipol Itching and Other (See Comments)      Swelling lower legs   • Ezetimibe GI Intolerance   • Statins Myalgia       Objective   Vitals:Blood pressure (!) 192/79, pulse 68, temperature 98.5 °F (36.9 °C), temperature source Oral, resp. rate 14, weight 71.9 kg (158 lb 6.4 oz), SpO2 99 %. ,Body mass index is 33.11 kg/m². No intake or output data in the 24 hours ending 09/03/23 1546    Invasive Devices: Invasive Devices     Peripheral Intravenous Line  Duration           Peripheral IV 08/21/23 Right Antecubital 12 days    Peripheral IV 09/03/23 Left Antecubital <1 day    Peripheral IV 09/03/23 Right Wrist <1 day                Physical Exam  Vitals reviewed. Constitutional:       General: She is not in acute distress. Appearance: She is not ill-appearing, toxic-appearing or diaphoretic. HENT:      Head: Normocephalic and atraumatic. Right Ear: External ear normal.      Left Ear: External ear normal.      Nose: Nose normal.      Mouth/Throat:      Mouth: Mucous membranes are moist.   Eyes:      General:         Right eye: No discharge. Left eye: No discharge. Extraocular Movements: Extraocular movements intact and EOM normal.      Conjunctiva/sclera: Conjunctivae normal.      Pupils: Pupils are equal, round, and reactive to light. Cardiovascular:      Rate and Rhythm: Normal rate. Pulmonary:      Effort: Pulmonary effort is normal. No respiratory distress. Musculoskeletal:         General: Tenderness (L thoracic paraspinal) present. Normal range of motion. Right lower leg: No edema. Left lower leg: No edema. Skin:     General: Skin is warm and dry. Neurological:      Mental Status: She is alert and oriented to person, place, and time.       Deep Tendon Reflexes:      Reflex Scores:       Tricep reflexes are 1+ on the right side and 1+ on the left side. Bicep reflexes are 1+ on the right side and 1+ on the left side. Brachioradialis reflexes are 1+ on the right side and 1+ on the left side. Patellar reflexes are 0 on the right side and 1+ on the left side. Achilles reflexes are 1+ on the right side and 1+ on the left side. Psychiatric:         Mood and Affect: Mood normal.       Neurologic Exam     Mental Status   Oriented to person, place, and time. Attention: normal. Concentration: normal.   Speech: (Expressive aphasia)  Able to name object. Normal comprehension. Cranial Nerves     CN II   Visual fields full to confrontation. CN III, IV, VI   Pupils are equal, round, and reactive to light. Extraocular motions are normal.     CN V   Facial sensation intact. CN VII   Facial expression full, symmetric. CN VIII   CN VIII normal.     CN IX, X   CN IX normal.   CN X normal.     CN XI   CN XI normal.     CN XII   CN XII normal.     Motor Exam   Muscle bulk: normal  Overall muscle tone: normal  Able to hold RUE, LUE, LLE antigravity. Unable to hold RLE against gravity. Sensory Exam   Light touch normal.   Right arm pinprick: normal  Left arm pinprick: normal  Right leg pinprick: decreased from knee  Left leg pinprick: normal    Gait, Coordination, and Reflexes     Reflexes   Right brachioradialis: 1+  Left brachioradialis: 1+  Right biceps: 1+  Left biceps: 1+  Right triceps: 1+  Left triceps: 1+  Right patellar: 0  Left patellar: 1+  Right achilles: 1+  Left achilles: 1+  Right plantar: normal  Left plantar: normal  L FNF intact. NIHSS:  1a.Level of Consciousness: 0 = Alert   1b. LOC Questions: 2 = Answers neither correctly   1c. LOC Commands: 0 = Obeys both correctly   2. Best Gaze: 0 = Normal   3. Visual: 1 = Partial hemianopia   4. Facial Palsy: 0=Normal symmetric movement   5a. Motor Right Arm: 0=No drift, limb holds 90 (or 45) degrees for full 10 seconds   5b.  Motor Left Arm: 0=No drift, limb holds 90 (or 45) degrees for full 10 seconds   6a. Motor Right Leg: 3=No effort against gravity, limb falls   6b. Motor Left Le=No drift, limb holds 90 (or 45) degrees for full 10 seconds   7. Limb Ataxia:  0=Absent   8. Sensory: 1=Mild to moderate sensory loss; patient feels pinprick is less sharp or is dull on the affected side; there is a loss of superficial pain with pinprick but patient is aware She is being touched   9. Best Language:  1=Mild to moderate aphasia; some obvious loss of fluency or facility of comprehension without significant limitation on ideas expressed or form of expression. 10. Dysarthria: 0=Normal articulation   11. Extinction and Inattention (formerly Neglect): 0=No abnormality   Total Score: 8     Time NIHSS was completed: 12:45    Modified Arroyo Score:  4 (Moderately severe disability; unable to walk and attend to bodily needs without assistance)    Lab Results:   CBC:   Results from last 7 days   Lab Units 23  1314   WBC Thousand/uL 6.70   RBC Million/uL 3.76*   HEMOGLOBIN g/dL 11.7   HEMATOCRIT % 34.7*   MCV fL 92   PLATELETS Thousands/uL 267   , BMP/CMP:   Results from last 7 days   Lab Units 23  1314   SODIUM mmol/L 130*   POTASSIUM mmol/L 4.2   CHLORIDE mmol/L 100   CO2 mmol/L 24   BUN mg/dL 65*   CREATININE mg/dL 1.93*   CALCIUM mg/dL 8.9   EGFR ml/min/1.73sq m 24   , HgBA1C:   , TSH:   , Lipid Profile:     Imaging Studies: I have personally reviewed pertinent films in PACS  EKG, Pathology, and Other Studies: I have personally reviewed pertinent reports. VTE Prophylaxis: Sequential compression device (Venodyne) ; pharmaceutical DVT defer to primary team    Code Status: Prior  Advance Directive and Living Will:      Power of :    POLST:      Counseling / Coordination of Care  Total Critical Care time spent 30 minutes excluding procedures, teaching and family updates.

## 2023-09-03 NOTE — ASSESSMENT & PLAN NOTE
Patient presents with right upper and lower extremity weakness while at [facility] undergoing physical therapy. She was a stroke alert and was eval by neurology in the ED. Patient with significant carotid and intracranial arteries disease  Patient is symptomatically improving  Per neurology recrudescence of previous stroke likely due to orthostasis while working with physical therapy given significant carotid artery and intracranial artery disease  She is placed on stroke pathway  Continue aspirin, Brilinta  Tolerates pravastatin  Follow-up on imaging studies  Neurology recommends permissive hypertension goal blood pressure 160 - 220 -hold losartan amlodipine.   Continue clonidine due to concerns for rebound hypertension if discontinued  Monitor blood pressures, avoid hypotension  Neurology following

## 2023-09-03 NOTE — ASSESSMENT & PLAN NOTE
Patient with left carotid artery disease stroke  CTA head and neck reveals extensive intracranial atherosclerotic disease stenosis extensive disease bilateral carotid arteries  Continue aspirin statin Brilinta  Patient is waiting for well-controlled diabetic state for operative intervention with vascular surgery  Outpatient vascular surgery follow-up

## 2023-09-03 NOTE — ASSESSMENT & PLAN NOTE
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 This occurred while she was working with PT at [employer]. Denied headache, dizziness, numbness/tingling. Initial NIHSS 8 - inability to accurately state month and age, R hemianopia, RLE weakness, RLE decreased sensation, moderate expressive aphasia. Patient has been on ASA/Brilinta since 7/29/2023 for CVA. No TNK administered given complete resolution of acute symptoms. CTH showed stable subacute infarcts within the L occipital and parietal lobes. CTA showed stable advanced atherosclerotic disease throughout cervical and intracranial vasculature with multiple areas of severe stenosis most prominent within the ICA bulb bilaterally; no significant change compared with prior examination. Patient was recently scheduled for TCAR with vascular surgery but this was canceled due to elevated A1c level. Admitted on stroke pathway. MRI brain concerning for acute stroke, etiology most likely associated with severe ICA stenosis. Vascular surgery team consulted for intervention. Prior work-up:  -Echo, 8/18/23: EF 62%. G1DD, LA normal size, no major valvular dysfunction. -MRI brain wo contrast, 9/5/23: 1. Scattered multifocal infarctions predominantly in the left occipital lobe as well as in the left frontal and parietal lobes in the watershed territory, slightly increased in size and number from the prior study indicative of trace crescendo   infarctions/progressive infarctions associated with previously present infarctions in these region. 2. Trace T1 high signal in the left occipital lobe is worrisome for petechial microhemorrhage. No significant mass effect.  Recommend follow-up repeat head CT in 24 hours to assess for stability. 3. Moderate, chronic microangiopathy. Lab Results   Component Value Date    HGBA1C 8.5 (H) 08/16/2023    LDLCALC 79 08/16/2023       Impression: MRI brain demonstrated multifocal acute infarcts in the left occipital, left frontal, left parietal regions and watershed territories. Etiology suspected to be secondary to hypoperfusion in the setting of severe ICA stenosis. Plan, Stroke Pathway:  · Discussed with attending Dr. Kellen Hamilton  · Continue home ASA/Brilinta  · Continue home Pravastatin 80mg daily with dinner  · SBP goal 120-160  · Vascular surgery consulted for urgent intervention of ICA stenosis. · Given concerns for poststroke depression/anxiety, started Lexapro 10 mg daily. · Normoglycemia, normothermia. · Monitor on telemetry  · Bedside swallow eval  · Stat CT Head for change in neuro status. · DVT ppx, SCDs  • PT/OT/PM&R evaluations   • Medical management as per primary team appreciated.

## 2023-09-03 NOTE — ED PROVIDER NOTES
History  Chief Complaint   Patient presents with   • STROKE Alert     70-year-old female is presenting from her acute care rehab facility with concerns for new onset right-sided weakness and flaccidity. Patient was reportedly last seen well at 10:30 AM by staff. They report that she has been [place] from a prior stroke. She did have ongoing concerns for aphasia even before the events today. Patient was reportedly in the middle of physical therapy whenever she suddenly could not lift her right arm or move her right leg. Patient was sent in via EMS where EMS reports a blood glucose in the 140s. They reported that in route to the hospital patient began moving her right arm and right leg again. At time of my examination, patient is able to keep her right arm up in the air when I lift it for her, and she is moving the toes on her right leg but is unable to lift against gravity currently. She does seem to have some bilateral hemianopsia as well. She does have occasional trouble with word finding but otherwise all symptoms seem chronic at this time. She does have an improving examination throughout initial evaluation. Prior to Admission Medications   Prescriptions Last Dose Informant Patient Reported? Taking?    Glucagon 1 MG/0.2ML SOAJ Unknown  Yes No   Sig: Inject 1 mg under the skin if needed (low blood sugar)   Heparin Sodium, Porcine, (heparin, porcine,) 5,000 units/mL   Yes Yes   Sig: Inject 5,000 Units under the skin every 8 (eight) hours   acetaminophen (TYLENOL) 500 mg tablet   Yes Yes   Sig: Take 650 mg by mouth every 6 (six) hours as needed for mild pain   allopurinol (ZYLOPRIM) 100 mg tablet 9/3/2023 Self, Child No Yes   Sig: TAKE 1 TABLET BY MOUTH EVERY DAY   amLODIPine (NORVASC) 10 mg tablet 9/3/2023 Self, Child No Yes   Sig: Take 1 tablet (10 mg total) by mouth daily   aspirin 81 mg chewable tablet 9/3/2023  Yes Yes   Sig: Chew 81 mg daily   cinacalcet (SENSIPAR) 30 mg tablet 9/3/2023  No Yes   Sig: Take 1 tablet (30 mg total) by mouth every other day   cloNIDine (CATAPRES-TTS-3) 0.3 mg/24 hr 9/2/2023 Self, Child No Yes   Sig: PLACE 1 PATCH (0.3 MG TOTAL) ON THE SKIN ONCE A WEEK   glipiZIDE (GLUCOTROL) 5 mg tablet 9/3/2023  No Yes   Sig: Take 1 tablet (5 mg total) by mouth daily with breakfast   insulin lispro (HumaLOG) 100 units/mL injection   Yes Yes   Sig: Inject under the skin   labetalol (NORMODYNE) 300 mg tablet  Self, Child No No   Sig: Take 1 tablet (300 mg total) by mouth 2 (two) times a day   Patient taking differently: Take 400 mg by mouth 2 (two) times a day   lidocaine (LIDODERM) 5 % 9/3/2023  Yes Yes   Sig: Apply 1 patch topically daily Remove & Discard patch within 12 hours or as directed by MD   losartan (COZAAR) 100 MG tablet  Self, Child No No   Sig: Take 1 tablet (100 mg total) by mouth daily   pravastatin (PRAVACHOL) 80 mg tablet 9/2/2023  No Yes   Sig: Take 1 tablet (80 mg total) by mouth daily with dinner   ticagrelor (BRILINTA) 90 MG 9/3/2023  No Yes   Sig: Take 1 tablet (90 mg total) by mouth every 12 (twelve) hours   traZODone (DESYREL) 50 mg tablet 9/2/2023  No Yes   Sig: Take 1 tablet (50 mg total) by mouth daily at bedtime      Facility-Administered Medications: None       Past Medical History:   Diagnosis Date   • Arthritis    • Chronic kidney disease    • Diabetes mellitus ([address])    • Endometriosis    • High cholesterol    • Hypertension    • Kidney disease, chronic, stage III (moderate, EGFR 30-59 ml/min) ([facility])    • Lumbar disc herniation    • Neuropathy    • Peripheral vascular disease ([facility])    • Pneumonia    • Shoulder injury     left   • Spinal stenosis    • Stroke ([address]) 2015    Memory loss       Past Surgical History:   Procedure Laterality Date   • CARDIAC CATHETERIZATION     • COLONOSCOPY     • FL RETROGRADE PYELOGRAM  4/6/2020   • FRACTURE SURGERY Right     ankle   • OVARIAN CYST SURGERY     • AK CYSTO BLADDER W/URETERAL CATHETERIZATION Bilateral 4/6/2020 Procedure: CYSTOSCOPY WITH RETROGRADE PYELOGRAM;  Surgeon: Breanna Nick MD;  [facility];  Service: Urology   • ME CYSTO W/INSERT URETERAL STENT Right 4/6/2020    Procedure: INSERTION STENT URETERAL;  Surgeon: Breanna Nick MD;  [facility];  Service: Urology   • ME CYSTO W/REMOVAL OF TUMORS SMALL N/A 4/6/2020    Procedure: TRANSURETHRAL RESECTION OF BLADDER TUMOR (TURBT); Surgeon: Breanna Nick MD;  [facility];  Service: Urology   • ROTATOR CUFF REPAIR Left    • TONSILLECTOMY         Family History   Problem Relation Age of Onset   • Hypertension Mother    • Heart disease Mother         Valvular   • Hyperlipidemia Mother    • Hypertension Father    • Heart defect Father         Cardiomegaly   • Stroke Sister         Cerebrovascular Accident   • Arthritis Brother    • Other Brother         Back Disorder     I have reviewed and agree with the history as documented. E-Cigarette/Vaping   • E-Cigarette Use Never User      E-Cigarette/Vaping Substances   • Nicotine No    • THC No    • CBD No    • Flavoring No    • Other No    • Unknown No      Social History     Tobacco Use   • Smoking status: Former     Packs/day: 2.00     Years: 40.00     Total pack years: 80.00     Types: Cigarettes     Start date: 1966     Quit date: 7/27/2020     Years since quitting: 3.1   • Smokeless tobacco: Never   Vaping Use   • Vaping Use: Never used   Substance Use Topics   • Alcohol use: Never   • Drug use: Never        Review of Systems   Constitutional: Positive for fatigue. Negative for chills and fever. HENT: Negative for ear pain and sore throat. Eyes: Negative for pain and visual disturbance. Respiratory: Negative for cough and shortness of breath. Cardiovascular: Negative for chest pain and palpitations. Gastrointestinal: Negative for abdominal pain and vomiting. Genitourinary: Negative for dysuria and hematuria. Musculoskeletal: Negative for arthralgias and back pain.    Skin: Negative for color change and rash. Neurological: Positive for dizziness, weakness and light-headedness. Negative for seizures and syncope. All other systems reviewed and are negative. Physical Exam  ED Triage Vitals   Temperature Pulse Respirations Blood Pressure SpO2   09/03/23 1315 09/03/23 1255 09/03/23 1250 09/03/23 1250 09/03/23 1315   98.5 °F (36.9 °C) 66 20 (!) 186/94 99 %      Temp Source Heart Rate Source Patient Position - Orthostatic VS BP Location FiO2 (%)   09/03/23 1315 09/03/23 1255 09/03/23 1315 09/03/23 1600 --   Oral Monitor Lying Right arm       Pain Score       09/03/23 1520       No Pain             Orthostatic Vital Signs  Vitals:    09/03/23 2010 09/03/23 2014 09/03/23 2200 09/04/23 0037   BP: (!) 184/59  (!) 182/61 (!) 174/76   Pulse: 72 80 71 85   Patient Position - Orthostatic VS:           Physical Exam  Vitals and nursing note reviewed. Constitutional:       General: She is not in acute distress. Appearance: She is well-developed. She is obese. Comments: Chronically frail appearing   HENT:      Head: Normocephalic and atraumatic. Right Ear: External ear normal.      Left Ear: External ear normal.      Nose: Nose normal. No congestion or rhinorrhea. Mouth/Throat:      Mouth: Mucous membranes are moist.      Pharynx: Oropharynx is clear. No oropharyngeal exudate or posterior oropharyngeal erythema. Eyes:      General: No scleral icterus. Extraocular Movements: Extraocular movements intact. Conjunctiva/sclera: Conjunctivae normal.      Pupils: Pupils are equal, round, and reactive to light. Comments: Bilateral lateral hemianopsia   Cardiovascular:      Rate and Rhythm: Normal rate and regular rhythm. Pulses: Normal pulses. Heart sounds: Normal heart sounds. No murmur heard. Pulmonary:      Effort: Pulmonary effort is normal. No respiratory distress. Breath sounds: Normal breath sounds. No wheezing or rhonchi.    Abdominal: General: Abdomen is flat. There is no distension. Palpations: Abdomen is soft. Tenderness: There is no abdominal tenderness. There is no guarding. Musculoskeletal:         General: No swelling. Cervical back: Neck supple. No rigidity. Right lower leg: No edema. Left lower leg: No edema. Lymphadenopathy:      Cervical: No cervical adenopathy. Skin:     General: Skin is warm and dry. Capillary Refill: Capillary refill takes less than 2 seconds. Coloration: Skin is not jaundiced. Findings: No rash. Neurological:      Mental Status: She is alert. Sensory: Sensory deficit present. Motor: Weakness (Patient able to keep right arm up against gravity however does seem weaker on right compared to left in the upper extremity. For left lower extremity full 5 out of 5 strength, right lower extremity is 1 out of 5 strength) present. Gait: Gait abnormal.      Comments: Difficult to assess secondary to aphasia, however patient is able to tell me her age, date of birth, and name and location.          ED Medications  Medications   lidocaine (LIDODERM) 5 % patch 2 patch (2 patches Topical Patch Removed 9/4/23 0052)   aspirin chewable tablet 81 mg (has no administration in time range)   ticagrelor (BRILINTA) tablet 90 mg (90 mg Oral Given 9/3/23 2209)   pravastatin (PRAVACHOL) tablet 80 mg (80 mg Oral Given 9/3/23 1751)   allopurinol (ZYLOPRIM) tablet 100 mg (has no administration in time range)   cloNIDine (CATAPRES-TTS-3) 0.3 mg/24 hr TD weekly patch (has no administration in time range)   cinacalcet (SENSIPAR) tablet 30 mg (has no administration in time range)   traZODone (DESYREL) tablet 50 mg (50 mg Oral Given 9/3/23 2209)   polyethylene glycol (MIRALAX) packet 17 g (has no administration in time range)   ondansetron (ZOFRAN) injection 4 mg (has no administration in time range)   aluminum-magnesium hydroxide-simethicone (MAALOX) oral suspension 30 mL (has no administration in time range)   heparin (porcine) subcutaneous injection 5,000 Units (5,000 Units Subcutaneous Given 9/3/23 2210)   insulin glargine (LANTUS) subcutaneous injection 12 Units 0.12 mL (12 Units Subcutaneous Given 9/3/23 2210)   insulin lispro (HumaLOG) 100 units/mL subcutaneous injection 3 Units (3 Units Subcutaneous Given 9/3/23 1911)   iohexol (OMNIPAQUE) 350 MG/ML injection (MULTI-DOSE) 85 mL (85 mL Intravenous Given 9/3/23 1307)       Diagnostic Studies  Results Reviewed     Procedure Component Value Units Date/Time    HS Troponin I 4hr [[id number]]  (Normal) Collected: 09/03/23 1804    Lab Status: Final result Specimen: Blood from Arm, Right Updated: 09/03/23 1853     hs TnI 4hr 7 ng/L      Delta 4hr hsTnI 1 ng/L     Lipid panel [[id number]]  (Abnormal) Collected: 09/03/23 1314    Lab Status: Final result Specimen: Blood from Arm, Left Updated: 09/03/23 1738     Cholesterol 144 mg/dL      Triglycerides 164 mg/dL      HDL, Direct 52 mg/dL      LDL Calculated 59 mg/dL      Non-HDL-Chol (CHOL-HDL) 92 mg/dl     Hemoglobin A1C [[id number]] Collected: 09/03/23 1314    Lab Status:  In process Specimen: Blood from Arm, Left Updated: 09/03/23 1737    HS Troponin I 2hr [[id number]]  (Normal) Collected: 09/03/23 1504    Lab Status: Final result Specimen: Blood from Arm, Left Updated: 09/03/23 1544     hs TnI 2hr 6 ng/L      Delta 2hr hsTnI 0 ng/L     HS Troponin 0hr (reflex protocol) [[id number]]  (Normal) Collected: 09/03/23 1314    Lab Status: Final result Specimen: Blood from Arm, Left Updated: 09/03/23 1349     hs TnI 0hr 6 ng/L     Basic metabolic panel [[id number]]  (Abnormal) Collected: 09/03/23 1314    Lab Status: Final result Specimen: Blood from Arm, Left Updated: 09/03/23 1345     Sodium 130 mmol/L      Potassium 4.2 mmol/L      Chloride 100 mmol/L      CO2 24 mmol/L      ANION GAP 6 mmol/L      BUN 65 mg/dL      Creatinine 1.93 mg/dL      Glucose 135 mg/dL      Calcium 8.9 mg/dL      eGFR 24 ml/min/1.73sq m     Narrative:      [facility] guidelines for Chronic Kidney Disease (CKD):   •  Stage 1 with normal or high GFR (GFR > 90 mL/min/1.73 square meters)  •  Stage 2 Mild CKD (GFR = 60-89 mL/min/1.73 square meters)  •  Stage 3A Moderate CKD (GFR = 45-59 mL/min/1.73 square meters)  •  Stage 3B Moderate CKD (GFR = 30-44 mL/min/1.73 square meters)  •  Stage 4 Severe CKD (GFR = 15-29 mL/min/1.73 square meters)  •  Stage 5 End Stage CKD (GFR <15 mL/min/1.73 square meters)  Note: GFR calculation is accurate only with a steady state creatinine    Protime-INR [[id number]]  (Normal) Collected: 09/03/23 1314    Lab Status: Final result Specimen: Blood from Arm, Left Updated: 09/03/23 1337     Protime 13.2 seconds      INR 0.98    APTT [[id number]]  (Normal) Collected: 09/03/23 1314    Lab Status: Final result Specimen: Blood from Arm, Left Updated: 09/03/23 1337     PTT 34 seconds     CBC and Platelet [[id number]]  (Abnormal) Collected: 09/03/23 1314    Lab Status: Final result Specimen: Blood from Arm, Left Updated: 09/03/23 1327     WBC 6.70 Thousand/uL      RBC 3.76 Million/uL      Hemoglobin 11.7 g/dL      Hematocrit 34.7 %      MCV 92 fL      MCH 31.1 pg      MCHC 33.7 g/dL      RDW 15.4 %      Platelets 088 Thousands/uL      MPV 10.2 fL     Fingerstick Glucose (POCT) [[id number]]  (Normal) Collected: 09/03/23 1308    Lab Status: Final result Updated: 09/03/23 1312     POC Glucose 134 mg/dl     Fingerstick Glucose (POCT) [[id number]]  (Normal) Collected: 09/03/23 1249    Lab Status: Final result Updated: 09/03/23 1253     POC Glucose 137 mg/dl                  CTA stroke alert (head/neck)   Final Result by Dada Arellano DO (09/03 1325)      Stable advanced atherosclerotic disease throughout the cervical and intracranial vasculature with multiple areas of severe stenosis most prominent within the internal carotid artery bulb bilaterally.       Advanced atherosclerotic disease of the distal left vertebral artery with occlusion proximal to the vertebrobasilar junction and severe high-grade stenosis of the proximal basilar. Additional areas of moderate to advanced atherosclerotic disease    scattered throughout the cervical and intracranial vasculature. Overall no significant change when compared with the prior examination. Findings were directly discussed with Elvia Quesada  at 1:20 p.m. Workstation performed: [id number]         CT stroke alert brain   Final Result by Dada Roldan DO (09/03 1326)      Stable subacute infarcts within the left occipital and parietal lobes. No mass effect or hemorrhagic transformation. Additional moderate periventricular white matter change consistent with chronic microangiopathy. No hemorrhage. Findings were directly discussed with Elvia Quesada  at 1:20 p.m. Workstation performed: [id number]         XR spine lumbar 2 or 3 views injury    (Results Pending)   MRI brain wo contrast    (Results Pending)         Procedures  Procedures      ED Course                  Stroke Assessment     Row Name 09/03/23 1523 09/03/23 1752          NIH Stroke Scale    Interval Baseline Baseline     Level of Consciousness (1a.) -- 0     LOC Questions (1b.) -- 0     LOC Commands (1c.) -- 0     Best Gaze (2.) -- 0     Visual (3.) -- 0     Facial Palsy (4.) -- 0     Motor Arm, Left (5a.) -- 0     Motor Arm, Right (5b.) -- 1     Motor Leg, Left (6a.) -- 0     Motor Leg, Right (6b.) -- 3     Limb Ataxia (7.) -- 1     Sensory (8.) -- 1     Best Language (9.) -- 2     Dysarthria (10.) -- 1     Extinction and Inattention (11.) (Formerly Neglect) -- 0     Total -- 9             Flowsheet Row Most Recent Value   Thrombolytic Decision Options    Thrombolytic Decision Patient not a candidate. Patient is not a candidate options Symptoms resolved/clearly non disabling.                             Medical Decision Making  DDX: recrudescence of prior stroke, new CVA, TIA, Drew's paralysis, metabolic disturbance, anemia, hypoglycemia    I did review patient's medical history and recent admissions and summarized above as appropriate    After initial evaluation by neurology, patient's symptoms were significantly improved. NIH is improving as well. At this time, patient's symptoms were thought to be most likely from recrudescence of old stroke territory. Will admit for ongoing monitoring and trending neuro examination. Neurology is recommending continuation of DAPT. Will admit to medicine team with neurology consult. Amount and/or Complexity of Data Reviewed  Labs: ordered. Radiology: ordered. Risk  Prescription drug management. Decision regarding hospitalization. Disposition  Final diagnoses:   Primary hypertension   Stroke-like symptoms     Time reflects when diagnosis was documented in both MDM as applicable and the Disposition within this note     Time User Action Codes Description Comment    9/3/2023 12:39 PM Kaycee Bernal Add [I10] Primary hypertension     9/3/2023  3:19 PM Belva Homans Add [R29.90] Stroke-like symptoms       ED Disposition     ED Disposition   Admit    Condition   Stable    Date/Time   Sun Sep 3, 2023  3:19 PM    Comment   Case was discussed with Dr. Jose Elias Tompkins and the patient's admission status was agreed to be Admission Status: inpatient status to the service of Dr. Jose Elias Tompkins . Follow-up Information    None         Current Discharge Medication List      CONTINUE these medications which have NOT CHANGED    Details   acetaminophen (TYLENOL) 500 mg tablet Take 650 mg by mouth every 6 (six) hours as needed for mild pain      allopurinol (ZYLOPRIM) 100 mg tablet TAKE 1 TABLET BY MOUTH EVERY DAY  Qty: 90 tablet, Refills: 2    Comments: DX Code Needed  .   Associated Diagnoses: Hyperuricemia      amLODIPine (NORVASC) 10 mg tablet Take 1 tablet (10 mg total) by mouth daily  Qty: 90 tablet, Refills: 1    Comments: Hypertension associated with diabetes ([address]) (E11.59 , I15.2)  Associated Diagnoses: Hyperlipidemia associated with type 2 diabetes mellitus ([address]); Hypertension associated with diabetes ([address]); Aortoiliac stenosis, left ([address]); Claudication in peripheral vascular disease ([address]); Basilar artery stenosis      aspirin 81 mg chewable tablet Chew 81 mg daily      cinacalcet (SENSIPAR) 30 mg tablet Take 1 tablet (30 mg total) by mouth every other day  Qty: 45 tablet, Refills: 2    Associated Diagnoses: Stage 4 chronic kidney disease ([address]); Secondary hyperparathyroidism of renal origin ([address]); Hypercalcemia      cloNIDine (CATAPRES-TTS-3) 0.3 mg/24 hr PLACE 1 PATCH (0.3 MG TOTAL) ON THE SKIN ONCE A WEEK  Qty: 12 patch, Refills: 1    Comments: Hypertension associated with diabetes ([address]) (E11.59 , I15.2)  Associated Diagnoses: Hypertension associated with diabetes ([address]); Rash; Hypertension, unspecified type; Dependent edema; Aortoiliac stenosis, left ([address]); History of CVA (cerebrovascular accident); Claudication in peripheral vascular disease ([address]); Basilar artery stenosis;  Hyperlipidemia associated with type 2 diabetes mellitus (HCC)      glipiZIDE (GLUCOTROL) 5 mg tablet Take 1 tablet (5 mg total) by mouth daily with breakfast  Qty: 30 tablet, Refills: 5    Associated Diagnoses: Type 2 diabetes mellitus with diabetic nephropathy, without long-term current use of insulin (HCC)      Heparin Sodium, Porcine, (heparin, porcine,) 5,000 units/mL Inject 5,000 Units under the skin every 8 (eight) hours      insulin lispro (HumaLOG) 100 units/mL injection Inject under the skin      lidocaine (LIDODERM) 5 % Apply 1 patch topically daily Remove & Discard patch within 12 hours or as directed by MD      pravastatin (PRAVACHOL) 80 mg tablet Take 1 tablet (80 mg total) by mouth daily with dinner  Qty: 30 tablet, Refills: 0    Associated Diagnoses: Hyperlipidemia associated with type 2 diabetes mellitus ([address]) ticagrelor (BRILINTA) 90 MG Take 1 tablet (90 mg total) by mouth every 12 (twelve) hours  Qty: 60 tablet, Refills: 0    Associated Diagnoses: CVA (cerebral vascular accident) ([address])      traZODone (DESYREL) 50 mg tablet Take 1 tablet (50 mg total) by mouth daily at bedtime  Qty: 30 tablet, Refills: 0    Associated Diagnoses: Depression, recurrent (HCC)      Glucagon 1 MG/0.2ML SOAJ Inject 1 mg under the skin if needed (low blood sugar)      labetalol (NORMODYNE) 300 mg tablet Take 1 tablet (300 mg total) by mouth 2 (two) times a day  Qty: 180 tablet, Refills: 1    Comments: Acute drug-induced gout of left foot (S91.033)  Associated Diagnoses: Acute drug-induced gout of left foot      losartan (COZAAR) 100 MG tablet Take 1 tablet (100 mg total) by mouth daily  Qty: 90 tablet, Refills: 1    Comments: Hypertension, unspecified type [I10]  Associated Diagnoses: Hypertension, unspecified type           No discharge procedures on file. PDMP Review       Value Time User    PDMP Reviewed  Yes 4/6/2020  8:10 AM Malou Joshua MD           ED Provider  Attending physically available and evaluated Rosemary Dalton. I managed the patient along with the ED Attending.     Electronically Signed by         Nichelle Ayon MD  09/04/23 0111

## 2023-09-03 NOTE — H&P
[address] [facility] [place]  H&P  Name: Juice Mcclellan 70 y.o. female I MRN: [id number]  [facility] I Date of Admission: 9/3/2023   Date of Service: 9/3/2023 I Hospital Day: 0      Assessment/Plan   * Stroke-like symptoms  Assessment & Plan  Patient presents with right upper and lower extremity weakness while at [facility] undergoing physical therapy. She was a stroke alert and was eval by neurology in the ED. Patient with significant carotid and intracranial arteries disease  Patient is symptomatically improving  Per neurology recrudescence of previous stroke likely due to orthostasis while working with physical therapy given significant carotid artery and intracranial artery disease  She is placed on stroke pathway  Continue aspirin, Brilinta  Tolerates pravastatin  Follow-up on imaging studies  Neurology recommends permissive hypertension goal blood pressure 160 - 220 -hold losartan amlodipine.   Continue clonidine due to concerns for rebound hypertension if discontinued  Monitor blood pressures, avoid hypotension  Neurology following    Aphasia as late effect of cerebrovascular accident (CVA)  Assessment & Plan  History of stroke with aphasia  Supportive cares    Aortoiliac occlusive disease ([address])  Assessment & Plan  Continue aspirin statin  Outpatient vascular surgery follow-up    Diabetes [facility])  Assessment & Plan  Lab Results   Component Value Date    HGBA1C 8.5 (H) 08/16/2023       Recent Labs     09/03/23  1249 09/03/23  1308   POCGLU 137 134       Blood Sugar Average: Last 72 hrs:  (P) 135.5     Diabetes mellitus type 2 uncontrolled  Hold glipizide while inpatient  We will have her on Lantus at bedtime  Monitor Accu-Cheks  Avoid hypoglycemia  Hypoglycemia protocol in place    Depression, recurrent (HCC)  Assessment & Plan  Continue trazodone    Symptomatic carotid artery stenosis, left  Assessment & Plan  Patient with left carotid artery disease stroke  CTA head and neck reveals extensive intracranial atherosclerotic disease stenosis extensive disease bilateral carotid arteries  Continue aspirin statin Brilinta  Patient is waiting for well-controlled diabetic state for operative intervention with vascular surgery  Outpatient vascular surgery follow-up    Hyperlipidemia, unspecified  Assessment & Plan  History of myalgias to statin  Patient on pravastatin and tolerating  Continue pravastatin    Obesity (BMI 30-39. 9)  Assessment & Plan  Therapeutic lifestyle modification encouraged    Stage 3 chronic kidney disease [facility])  Assessment & Plan  Lab Results   Component Value Date    EGFR 24 09/03/2023    EGFR 29 08/23/2023    EGFR 27 08/22/2023    CREATININE 1.93 (H) 09/03/2023    CREATININE 1.65 (H) 08/23/2023    CREATININE 1.79 (H) 08/22/2023     Monitor kidney function  Avoid nephrotoxins           VTE Pharmacologic Prophylaxis: VTE Score: 11 High Risk (Score >/= 5) - Pharmacological DVT Prophylaxis Ordered: heparin. Sequential Compression Devices Ordered. Code Status: Level 3 - DNAR and DNI     Discussion with family: Discussed with the patient, updated son Liss Salcido questions answered. Anticipated Length of Stay: Patient will be admitted on an inpatient basis with an anticipated length of stay of greater than 2 midnights secondary to Strokelike symptoms patient is placed on stroke pathway follow-up on imaging studies work-up as outlined. Chief Complaint:     Strokelike symptoms    History of Present Illness:  Estevan Gibson is a 70 y.o. female with a PMH of stroke, extensive intracranial, carotid artery disease, uncontrolled diabetes mellitus type 2, hypertension, dyslipidemia, aphasia, obesity, arthritis, chronic kidney disease, history of statin intolerance causing myalgia tolerates pravastatin    Patient presents with right-sided weakness while she was working with physical therapy at [facility].   She was a stroke alert, she was eval by neurology in the ED, she has been placed on stroke pathway. Right-sided weakness is slowly improving, per neurology this could be due to orthostatic hypotension. Patient is a poor historian due to history of aphasia. History is obtained from review of records from SNF, review of chart as well as from son Marlyn Breen. Prior hospitalization outpatient notes reviewed in epic    Review of Systems:  Review of Systems   Unable to perform ROS: Other   History of aphasia    Past Medical and Surgical History:   Past Medical History:   Diagnosis Date   • Arthritis    • Chronic kidney disease    • Diabetes mellitus ([address])    • Endometriosis    • High cholesterol    • Hypertension    • Kidney disease, chronic, stage III (moderate, EGFR 30-59 ml/min) ([facility])    • Lumbar disc herniation    • Neuropathy    • Peripheral vascular disease ([address])    • Pneumonia    • Shoulder injury     left   • Spinal stenosis    • Stroke ([address]) 2015    Memory loss       Past Surgical History:   Procedure Laterality Date   • CARDIAC CATHETERIZATION     • COLONOSCOPY     • FL RETROGRADE PYELOGRAM  4/6/2020   • FRACTURE SURGERY Right     ankle   • OVARIAN CYST SURGERY     • [place] CYSTO BLADDER W/URETERAL CATHETERIZATION Bilateral 4/6/2020    Procedure: CYSTOSCOPY WITH RETROGRADE PYELOGRAM;  Surgeon: Ciara Aquino MD;  [facility];  Service: Urology   • KY CYSTO W/INSERT URETERAL STENT Right 4/6/2020    Procedure: INSERTION STENT URETERAL;  Surgeon: Ciara Aquino MD;  [facility];  Service: Urology   • [place] CYSTO W/REMOVAL OF TUMORS SMALL N/A 4/6/2020    Procedure: TRANSURETHRAL RESECTION OF BLADDER TUMOR (TURBT); Surgeon: Ciara Aquino MD;  [facility];  Service: Urology   • ROTATOR CUFF REPAIR Left    • TONSILLECTOMY         Meds/Allergies:  Prior to Admission medications    Medication Sig Start Date End Date Taking? Authorizing Provider   Alcohol Swabs 70 % PADS May substitute brand based on insurance coverage. Check glucose ACHS.   Patient not taking: Reported on 8/22/2023 7/29/23   GRACIELA Nguyen   allopurinol (ZYLOPRIM) 100 mg tablet TAKE 1 TABLET BY MOUTH EVERY DAY 1/23/23   Tony Song MD   amLODIPine (NORVASC) 10 mg tablet Take 1 tablet (10 mg total) by mouth daily 5/15/23   Gideon Jose MD   aspirin 81 mg chewable tablet Chew 1 tablet (81 mg total) daily 8/19/23 9/18/23  Maxime Jurado MD   Blood Glucose Monitoring Suppl (OneTouch Verio Reflect) w/Device KIT May substitute brand based on insurance coverage. Check glucose ACHS. 7/29/23   GRACIELA Nguyen   cinacalcet (SENSIPAR) 30 mg tablet Take 1 tablet (30 mg total) by mouth every other day 8/7/23 5/3/24  Tony Song MD   cloNIDine (CATAPRES-TTS-3) 0.3 mg/24 hr PLACE 1 PATCH (0.3 MG TOTAL) ON THE SKIN ONCE A WEEK 5/1/23   Gideon Jose MD   glipiZIDE (GLUCOTROL) 5 mg tablet Take 1 tablet (5 mg total) by mouth daily with breakfast 8/9/23   Gideon Jose MD   glucose blood (OneTouch Verio) test strip May substitute brand based on insurance coverage. Check glucose ACHS. 7/29/23   GRACIELA Nguyen   labetalol (NORMODYNE) 300 mg tablet Take 1 tablet (300 mg total) by mouth 2 (two) times a day 5/15/23   Gideon Jose MD   losartan (COZAAR) 100 MG tablet Take 1 tablet (100 mg total) by mouth daily 7/17/23   Gideon Jose MD   Omega-3 Fatty Acids (fish oil) 1,000 mg Take 1 capsule (1,000 mg total) by mouth daily Do not start before July 30, 2023. 7/30/23 8/29/23  GRACIELA NguyenTouch Delica Lancets 36V MISC May substitute brand based on insurance coverage. Check glucose ACHS.  7/29/23   GRACIELA Nguyen   pravastatin (PRAVACHOL) 80 mg tablet Take 1 tablet (80 mg total) by mouth daily with dinner 8/19/23   Rolinda Milch, MD   [facility]) 90 MG Take 1 tablet (90 mg total) by mouth every 12 (twelve) hours 8/19/23 9/18/23  Maxime Jurado MD   traZODone (DESYREL) 50 mg tablet Take 1 tablet (50 mg total) by mouth daily at bedtime 8/19/23   Josh Ordaz MD   zinc oxide 20 % ointment Apply topically as needed for irritation 6/26/23   Brady Garcia MD     I have reveiwed home medications using records provided by [facility]. Allergies:    Allergies   Allergen Reactions   • Fenofibrate Other (See Comments)      blood in urine  hx  Kidney Failure   • Colesevelam Other (See Comments)      leg pains   • Colestipol Itching and Other (See Comments)      Swelling lower legs   • Ezetimibe GI Intolerance   • Statins Myalgia       Social History:  Marital Status: /Civil Union   Occupation:   Patient Pre-hospital Living Situation: [facility]  Patient Pre-hospital Level of Mobility: History of stroke ambulatory dysfunction  Patient Pre-hospital Diet Restrictions: no  Substance Use History:   Social History     Substance and Sexual Activity   Alcohol Use Never     Social History     Tobacco Use   Smoking Status Former   • Packs/day: 2.00   • Years: 40.00   • Total pack years: 80.00   • Types: Cigarettes   • Start date: 1966   • Quit date: 7/27/2020   • Years since quitting: 3.1   Smokeless Tobacco Never     Social History     Substance and Sexual Activity   Drug Use Never       Family History:  Family History   Problem Relation Age of Onset   • Hypertension Mother    • Heart disease Mother         Valvular   • Hyperlipidemia Mother    • Hypertension Father    • Heart defect Father         Cardiomegaly   • Stroke Sister         Cerebrovascular Accident   • Arthritis Brother    • Other Brother         Back Disorder       Physical Exam:     Vitals:   Blood Pressure: 169/70 (09/03/23 1700)  Pulse: 86 (09/03/23 1700)  Temperature: 97.9 °F (36.6 °C) (09/03/23 1700)  Temp Source: Oral (09/03/23 1700)  Respirations: 20 (09/03/23 1700)  Weight - Scale: 71.9 kg (158 lb 6.4 oz) (09/03/23 1250)  SpO2: 97 % (09/03/23 1700)    Physical Exam     Comfortably in bed  Obese  Short thick neck  Lungs diminished breath sounds bilateral  Vesicular breath sounds  Heart sounds S1-S2 noted  Heart sounds are distant  Abdomen soft nontender  Abdominal obesity noted  Awake  Aphasia noted  Right-sided weakness noted  No pedal edema  No rash    Additional Data:     Lab Results:  Results from last 7 days   Lab Units 09/03/23  1314   WBC Thousand/uL 6.70   HEMOGLOBIN g/dL 11.7   HEMATOCRIT % 34.7*   PLATELETS Thousands/uL 267     Results from last 7 days   Lab Units 09/03/23  1314   SODIUM mmol/L 130*   POTASSIUM mmol/L 4.2   CHLORIDE mmol/L 100   CO2 mmol/L 24   BUN mg/dL 65*   CREATININE mg/dL 1.93*   ANION GAP mmol/L 6   CALCIUM mg/dL 8.9   GLUCOSE RANDOM mg/dL 135     Results from last 7 days   Lab Units 09/03/23  1314   INR  0.98     Results from last 7 days   Lab Units 09/03/23  1308 09/03/23  1249   POC GLUCOSE mg/dl 134 137               Lines/Drains:  Invasive Devices     Peripheral Intravenous Line  Duration           Peripheral IV 08/21/23 Right Antecubital 12 days    Peripheral IV 09/03/23 Left Antecubital <1 day    Peripheral IV 09/03/23 Right Wrist <1 day                    Imaging: Reviewed radiology reports from this admission including: CT head and ECHO  CTA stroke alert (head/neck)   Final Result by Dada Ram DO (09/03 1325)      Stable advanced atherosclerotic disease throughout the cervical and intracranial vasculature with multiple areas of severe stenosis most prominent within the internal carotid artery bulb bilaterally. Advanced atherosclerotic disease of the distal left vertebral artery with occlusion proximal to the vertebrobasilar junction and severe high-grade stenosis of the proximal basilar. Additional areas of moderate to advanced atherosclerotic disease    scattered throughout the cervical and intracranial vasculature. Overall no significant change when compared with the prior examination. Findings were directly discussed with Elvia Quesada  at 1:20 p.m. Workstation performed: [id number]         CT stroke alert brain   Final Result by Dada Garcia DO (09/03 1326)      Stable subacute infarcts within the left occipital and parietal lobes. No mass effect or hemorrhagic transformation. Additional moderate periventricular white matter change consistent with chronic microangiopathy. No hemorrhage. Findings were directly discussed with Elvia Quesada  at 1:20 p.m. Workstation performed: [id number]         MRI Inpatient Order    (Results Pending)   XR spine lumbar 2 or 3 views injury    (Results Pending)       EKG and Other Studies Reviewed on Admission:   · EKG: Sinus rhythm. ** Please Note: This note has been constructed using a voice recognition system.  **

## 2023-09-04 ENCOUNTER — APPOINTMENT (INPATIENT)
Dept: RADIOLOGY | Facility: HOSPITAL | Age: 78
DRG: 034 | End: 2023-09-04
Payer: COMMERCIAL

## 2023-09-04 LAB
ANION GAP SERPL CALCULATED.3IONS-SCNC: 11 MMOL/L
BASOPHILS # BLD AUTO: 0.05 THOUSANDS/ÂΜL (ref 0–0.1)
BASOPHILS NFR BLD AUTO: 1 % (ref 0–1)
BUN SERPL-MCNC: 51 MG/DL (ref 5–25)
CALCIUM SERPL-MCNC: 9.8 MG/DL (ref 8.4–10.2)
CHLORIDE SERPL-SCNC: 104 MMOL/L (ref 96–108)
CO2 SERPL-SCNC: 21 MMOL/L (ref 21–32)
CREAT SERPL-MCNC: 1.73 MG/DL (ref 0.6–1.3)
EOSINOPHIL # BLD AUTO: 0.33 THOUSAND/ÂΜL (ref 0–0.61)
EOSINOPHIL NFR BLD AUTO: 5 % (ref 0–6)
ERYTHROCYTE [DISTWIDTH] IN BLOOD BY AUTOMATED COUNT: 15.2 % (ref 11.6–15.1)
GFR SERPL CREATININE-BSD FRML MDRD: 28 ML/MIN/1.73SQ M
GLUCOSE SERPL-MCNC: 109 MG/DL (ref 65–140)
GLUCOSE SERPL-MCNC: 122 MG/DL (ref 65–140)
GLUCOSE SERPL-MCNC: 284 MG/DL (ref 65–140)
GLUCOSE SERPL-MCNC: 84 MG/DL (ref 65–140)
GLUCOSE SERPL-MCNC: 99 MG/DL (ref 65–140)
HCT VFR BLD AUTO: 37.4 % (ref 34.8–46.1)
HGB BLD-MCNC: 13.2 G/DL (ref 11.5–15.4)
IMM GRANULOCYTES # BLD AUTO: 0.1 THOUSAND/UL (ref 0–0.2)
IMM GRANULOCYTES NFR BLD AUTO: 1 % (ref 0–2)
LYMPHOCYTES # BLD AUTO: 1.52 THOUSANDS/ÂΜL (ref 0.6–4.47)
LYMPHOCYTES NFR BLD AUTO: 21 % (ref 14–44)
MCH RBC QN AUTO: 31.5 PG (ref 26.8–34.3)
MCHC RBC AUTO-ENTMCNC: 35.3 G/DL (ref 31.4–37.4)
MCV RBC AUTO: 89 FL (ref 82–98)
MONOCYTES # BLD AUTO: 0.83 THOUSAND/ÂΜL (ref 0.17–1.22)
MONOCYTES NFR BLD AUTO: 12 % (ref 4–12)
NEUTROPHILS # BLD AUTO: 4.3 THOUSANDS/ÂΜL (ref 1.85–7.62)
NEUTS SEG NFR BLD AUTO: 60 % (ref 43–75)
NRBC BLD AUTO-RTO: 0 /100 WBCS
PLATELET # BLD AUTO: 274 THOUSANDS/UL (ref 149–390)
PMV BLD AUTO: 10.2 FL (ref 8.9–12.7)
POTASSIUM SERPL-SCNC: 4.1 MMOL/L (ref 3.5–5.3)
RBC # BLD AUTO: 4.19 MILLION/UL (ref 3.81–5.12)
SODIUM SERPL-SCNC: 136 MMOL/L (ref 135–147)
WBC # BLD AUTO: 7.13 THOUSAND/UL (ref 4.31–10.16)

## 2023-09-04 PROCEDURE — 80048 BASIC METABOLIC PNL TOTAL CA: CPT | Performed by: INTERNAL MEDICINE

## 2023-09-04 PROCEDURE — 72100 X-RAY EXAM L-S SPINE 2/3 VWS: CPT

## 2023-09-04 PROCEDURE — 97163 PT EVAL HIGH COMPLEX 45 MIN: CPT

## 2023-09-04 PROCEDURE — 97167 OT EVAL HIGH COMPLEX 60 MIN: CPT

## 2023-09-04 PROCEDURE — 97530 THERAPEUTIC ACTIVITIES: CPT

## 2023-09-04 PROCEDURE — 82948 REAGENT STRIP/BLOOD GLUCOSE: CPT

## 2023-09-04 PROCEDURE — 85025 COMPLETE CBC W/AUTO DIFF WBC: CPT | Performed by: INTERNAL MEDICINE

## 2023-09-04 PROCEDURE — 99232 SBSQ HOSP IP/OBS MODERATE 35: CPT | Performed by: PHYSICIAN ASSISTANT

## 2023-09-04 PROCEDURE — 92523 SPEECH SOUND LANG COMPREHEN: CPT

## 2023-09-04 RX ADMIN — CINACALCET 30 MG: 30 TABLET, FILM COATED ORAL at 08:01

## 2023-09-04 RX ADMIN — HEPARIN SODIUM 5000 UNITS: 5000 INJECTION INTRAVENOUS; SUBCUTANEOUS at 05:34

## 2023-09-04 RX ADMIN — ASPIRIN 81 MG CHEWABLE TABLET 81 MG: 81 TABLET CHEWABLE at 08:01

## 2023-09-04 RX ADMIN — INSULIN LISPRO 3 UNITS: 100 INJECTION, SOLUTION INTRAVENOUS; SUBCUTANEOUS at 08:00

## 2023-09-04 RX ADMIN — INSULIN LISPRO 3 UNITS: 100 INJECTION, SOLUTION INTRAVENOUS; SUBCUTANEOUS at 12:22

## 2023-09-04 RX ADMIN — TRAZODONE HYDROCHLORIDE 50 MG: 50 TABLET ORAL at 21:51

## 2023-09-04 RX ADMIN — ALLOPURINOL 100 MG: 100 TABLET ORAL at 08:01

## 2023-09-04 RX ADMIN — TICAGRELOR 90 MG: 90 TABLET ORAL at 21:51

## 2023-09-04 RX ADMIN — LIDOCAINE 2 PATCH: 700 PATCH TOPICAL at 08:01

## 2023-09-04 RX ADMIN — POLYETHYLENE GLYCOL 3350 17 G: 17 POWDER, FOR SOLUTION ORAL at 08:01

## 2023-09-04 RX ADMIN — INSULIN LISPRO 3 UNITS: 100 INJECTION, SOLUTION INTRAVENOUS; SUBCUTANEOUS at 17:28

## 2023-09-04 RX ADMIN — HEPARIN SODIUM 5000 UNITS: 5000 INJECTION INTRAVENOUS; SUBCUTANEOUS at 21:51

## 2023-09-04 RX ADMIN — PRAVASTATIN SODIUM 80 MG: 80 TABLET ORAL at 17:27

## 2023-09-04 RX ADMIN — HEPARIN SODIUM 5000 UNITS: 5000 INJECTION INTRAVENOUS; SUBCUTANEOUS at 14:43

## 2023-09-04 RX ADMIN — INSULIN GLARGINE 12 UNITS: 100 INJECTION, SOLUTION SUBCUTANEOUS at 21:51

## 2023-09-04 RX ADMIN — TICAGRELOR 90 MG: 90 TABLET ORAL at 08:01

## 2023-09-04 NOTE — ASSESSMENT & PLAN NOTE
Initially recommended permissive hypertension per neurology   · Home amlodipine and losartan currently on hold   · Continued on home dose clonidine   · Monitor routinely

## 2023-09-04 NOTE — ASSESSMENT & PLAN NOTE
Lab Results   Component Value Date    EGFR 28 09/04/2023    EGFR 24 09/03/2023    EGFR 29 08/23/2023    CREATININE 1.73 (H) 09/04/2023    CREATININE 1.93 (H) 09/03/2023    CREATININE 1.65 (H) 08/23/2023     · Kidney function stable within baseline   · Avoid nephrotoxins and hypotension  · Monitor BMP intermittently

## 2023-09-04 NOTE — OCCUPATIONAL THERAPY NOTE
Occupational Therapy Evaluation      Emigdio Cantor    9/4/2023    Principal Problem:    Stroke-like symptoms  Active Problems:    Stage 3 chronic kidney disease (HCC)    Hypertension    Obesity (BMI 30-39. 9)    Hyperlipidemia, unspecified    Symptomatic carotid artery stenosis, left    Depression, recurrent (HCC)    Type 2 diabetes mellitus, without long-term current use of insulin (HCC)    Aortoiliac occlusive disease (HCC)    Aphasia as late effect of cerebrovascular accident (CVA)      Past Medical History:   Diagnosis Date    Arthritis     Chronic kidney disease     Diabetes mellitus (HCC)     Endometriosis     High cholesterol     Hypertension     Kidney disease, chronic, stage III (moderate, EGFR 30-59 ml/min) (HCC)     Lumbar disc herniation     Neuropathy     Peripheral vascular disease (HCC)     Pneumonia     Shoulder injury     left    Spinal stenosis     Stroke ([address]) 2015    Memory loss       Past Surgical History:   Procedure Laterality Date    CARDIAC CATHETERIZATION      COLONOSCOPY      FL RETROGRADE PYELOGRAM  4/6/2020    FRACTURE SURGERY Right     ankle    OVARIAN CYST SURGERY      VA CYSTO BLADDER W/URETERAL CATHETERIZATION Bilateral 4/6/2020    Procedure: CYSTOSCOPY WITH RETROGRADE PYELOGRAM;  Surgeon: Le Campbell MD;  [facility];  Service: Urology    VA CYSTO W/INSERT URETERAL STENT Right 4/6/2020    Procedure: INSERTION STENT URETERAL;  Surgeon: Le Campbell MD;  [facility];  Service: Urology    VA CYSTO W/REMOVAL OF TUMORS SMALL N/A 4/6/2020    Procedure: TRANSURETHRAL RESECTION OF BLADDER TUMOR (TURBT);   Surgeon: Le Campbell MD;  [facility];  Service: Urology    ROTATOR CUFF REPAIR Left     TONSILLECTOMY          09/04/23 1005   OT Last Visit   OT Visit Date 09/04/23   Note Type   Note type Evaluation   Pain Assessment   Pain Assessment Tool 0-10   Pain Score No Pain   Restrictions/Precautions   Weight Bearing Precautions Per Order No   Other Precautions Cognitive; Chair Alarm; Bed Alarm;Telemetry;Multiple lines; Fall Risk;Pain  (aphasic)   Home Living   Type of [address] One level;Performs ADLs on one level;Ramped entrance   Bathroom Shower/Tub Walk-in shower   Bathroom Toilet Raised   [place] shower seat;Commode   [address]; Wheelchair-manual;Cane   Prior Function   Level of Monitor Independent with ADLs; Independent with functional mobility   [facility] With [facility] Help From Family   IADLs Independent with driving; Independent with meal prep; Independent with medication management   Vocational Retired   Lifestyle   Autonomy Prior to recent CVA, Pt was fully IND w/ all ADLS   Reciprocal Relationships Per prior therapy notes, Pt lives w/ her son. Service to Others Pt is retired   Intrinsic Gratification None stated   General   Additional General Comments Pt admitted to [facility] from [facility] where she was receiving therapy services   ADL   Where Assessed Chair   Eating Assistance 7  Independent   Grooming Assistance 5  Supervision/Setup   UB Bathing Assistance 3  Moderate Assistance   LB Bathing Assistance 2  Maximal Assistance   UB Dressing Assistance 3  Moderate Assistance   LB Dressing Assistance 2  Maximal [address] [place]  2  Maximal Assistance   Bed Mobility   Supine to Sit 3  Moderate assistance   Additional items Assist x 1; Increased time required;LE management;Verbal cues; Bedrails;HOB elevated   Sit to Supine 3  Moderate assistance   Additional items Assist x 2; Increased time required;LE management;Verbal cues; Bedrails   Additional Comments Pt laying supine in bed upon OT arrival. Pt laying supine on stretcher for xray at end of session   Transfers   Sit to Stand 3  Moderate assistance   Additional items Increased time required;Assist x 1;Verbal cues;Armrests   Stand to Sit 3  Moderate assistance   Additional items Assist x 1; Increased time required;Verbal cues;Armrests   Additional Comments Tranfers w/ RW   Functional Mobility   Functional Mobility 3  Moderate assistance  (x2)   Additional Comments Pt demonstrated short distance household mobility in room w/ HHAx2 and RW - Pt having difficulty using RW. Additional items Hand hold assistance   Balance   Static Sitting Poor +   Dynamic Sitting Poor   Static Standing Poor -   Dynamic Standing Poor -   Ambulatory Poor -   Activity Tolerance   Activity Tolerance Patient limited by fatigue;Treatment limited secondary to agitation   Medical Staff Made Aware PT Kelsea Doyle; Pt seen as a co-session due to the patient's co-morbidities, clinically unstable presentation, and present impairments which are a regression from the patient's baseline. Nurse Made Aware RN cleared Pt for OT session   RUE Assessment   RUE Assessment X  (decreased AROM and strength)   LUE Assessment   LUE Assessment WFL   Hand Function   Gross Motor Coordination Impaired  (R>L)   Fine Motor Coordination Impaired  (R>L)   Psychosocial   Psychosocial (WDL) X   Patient Behaviors/Mood Tearful   Cognition   Overall Cognitive Status Impaired   Arousal/Participation Alert; Cooperative   Attention Attends with cues to redirect   Orientation Level Oriented to person   Memory Decreased recall of precautions;Decreased recall of recent events;Decreased short term memory   Following Commands Follows one step commands with increased time or repetition   Comments Pt is pleasant and cooperative for therapy today. Pt w/ decreased safety awareness and decreased insight into her deficits. Pt w/ expressive aphasia, however at times presents w/ global aphasia, having difficulty w/ sequencing, understanding simple commands. Assessment   Limitation Decreased ADL status; Decreased UE strength;Decreased cognition;Decreased Safe judgement during ADL;Decreased UE ROM; Decreased endurance;Decreased fine motor control;Decreased high-level ADLs   Prognosis Fair Assessment Pt is a 69 yo female seen for OT eval s/p adm to [facility] on 9/3/23 w/ R sided weakness while working with therapy at [facility] dx'd w/ stroke-like symptoms. "Per neurology, recrudescence of previous stroke likely due to orthostasis while working with physical therapy given significant carotid artery and intracranial artery disease." MRI brain ordered and pending. Pt  has a past medical history of Arthritis, Chronic kidney disease, Diabetes mellitus ([address]), Endometriosis, High cholesterol, Hypertension, Kidney disease, chronic, stage III (moderate, EGFR 30-59 ml/min) ([facility]), Lumbar disc herniation, Neuropathy, Peripheral vascular disease ([address]), Pneumonia, Shoulder injury, Spinal stenosis, and Stroke ([address]) (2015). Pt with active OT orders and activity as tolerated orders. Pt is retired and resides w/ her son in [facility] w/ ramped entrance. Pt admitted to [facility] from [facility] after having recent stroke. Prior to stroke, Pt was I w/ ADLS and IADLS, , & required no use of DME PTA. Pt is currently demonstrating the following occupational deficits: Mod A UB self care, Max A LB self care, Max A toileting, Mod A transfers, Mod Ax2 functional mobility w/ RW vs HHA. These deficits that are impacting pt's baseline areas of occupation are a result of the following impairments: pain, endurance, activity tolerance, functional mobility, forward functional reach, balance, functional standing tolerance, unsupportive home environment, decreased I w/ ADLS/IADLS, strength, ROM, aphasia, cognitive impairments, decreased safety awareness, decreased insight into deficits, ataxia, impaired fine motor skills and coordination deficits. The following Occupational Performance Areas to address include: grooming, bathing/shower, toilet hygiene, dressing, health maintenance, functional mobility, community mobility and clothing management.   Based on the aforementioned OT evaluation, functional performance deficits, and assessments, pt has been identified as a high complexity evaluation. Recommend inpatient rehab  upon D/C. The patient's raw score on the AM-PAC Daily Activity Inpatient Short Form is 14. A raw score of less than 19 suggests the patient may benefit from discharge to post-acute rehabilitation services. Please refer to the recommendation of the Occupational Therapist for safe discharge planning. Pt to continue to benefit from acute immediate OT services to address the following goals 3-5x/week to  w/in 10-14 days:   Goals   Patient Goals To walk   LTG Time Frame 10-14   Long Term Goal #1 Refer to goals below   Plan   Treatment Interventions ADL retraining;Functional transfer training;UE strengthening/ROM; Endurance training;Cognitive reorientation;Patient/family training;Equipment evaluation/education; Compensatory technique education; Activityengagement; Energy conservation   Goal Expiration Date 23   OT Frequency 2-3x/wk   Recommendation   OT Discharge Recommendation Post acute rehabilitation services  (return to rehab)   [facility] Daily Activity Inpatient   Lower Body Dressing 2   Bathing 2   Toileting 2   Upper Body Dressing 2   Grooming 3   Eating 3   Daily Activity Raw Score 14   Daily Activity Standardized Score (Calc for Raw Score >=11) 33.39   -PAC Applied Cognition Inpatient   Following a Speech/Presentation 2   Understanding Ordinary Conversation 3   Taking Medications 2   Remembering Where Things Are Placed or Put Away 2   Remembering List of 4-5 Errands 2   Taking Care of Complicated Tasks 2   Applied Cognition Raw Score 13   Applied Cognition Standardized Score 30.46       GOALS    1) Pt will imrpove activity tolerance to G for min 30 min txment sessions  2) Pt will complete ADLs/self care w/ mod I  3) Pt will complete toileting w/ mod I w/ G hygiene/thoroughness using DME PRN  4) Pt will improve fx'l tfers on/off all surfaces using dME PRN w/ G balance/safety including toileting w/ mod I  5) Pt will improve fx'l mobility during I/ADl/leisure tasks using DME PRN w/ g balance/safety w/ mod I  6) Pt will engage in ongoing cognitive assessment w/ G participation w/ mod I to A w/ safe d/c planning/recommendations  7) Pt will demonstrate G carryover of pt/caregiver education and training as appropriate w/ mod I w/o cues w/ G tolerance  8) Pt will engage in depression screen/leisure interest checklist w/ mod I and G participation to monitor s/s depression and ID 3 positive coping strategies to A w/ emotional regulation and management  9) Pt will improve UB fx'l use/ROM to [facility] and strength 1/2 MMT via AROM/AAROM/PROM in all planes as tolerated s/p skilled education of HEP w/ mod I w/o cues for carryover  11) Pt will engage in ongoing  assessments, screens, and activities t/o fx'l I/ADL/leisure tasks w/ G participation and mod I to A w/ adaptation and accomodations or rule out visual perceptual impairments  12) Pt will follow 100% simple one step verbal commands and be A/Ox4 consistently t/o use of external environmental cues w/ mod I  13) Pt will demonstrate 100% carryover of one handed dressing techniques w/ mod I s/p skilled education w/o cues            Barbara Styles MS, OTR/L

## 2023-09-04 NOTE — ASSESSMENT & PLAN NOTE
Lab Results   Component Value Date    HGBA1C 8.5 (H) 08/16/2023       Recent Labs     09/03/23  1308 09/03/23  1759 09/03/23  2103 09/04/23  0627   POCGLU 134 194* 119 109       Blood Sugar Average: Last 72 hrs:  (P) 138.6     Uncontrolled  · Hold glipizide while inpatient  · Continue Lantus at bedtime + SSI   · Monitor Accu-Cheks and adjust as needed   · Avoid hypoglycemia  · Hypoglycemia protocol in place

## 2023-09-04 NOTE — ASSESSMENT & PLAN NOTE
Patient presents with acute onset right upper and lower extremity weakness while at [facility] undergoing physical therapy for recent stroke in mid-August. She was a stroke alert and was evaluated by neurology in the ED. Patient symptomatically improving at time of admission. · Per neurology, recrudescence of previous stroke likely due to orthostasis while working with physical therapy given significant carotid artery and intracranial artery disease  · Continue aspirin, Brilinta, pravastatin  · MRI brain ordered and pending   · Neurology recommends permissive hypertension goal blood pressure 160 - 220  · Hold losartan amlodipine.   Continue clonidine due to concerns for rebound hypertension if discontinued  · Appreciate ongoing neurology recommendations   · PT/OT evaluations

## 2023-09-04 NOTE — PLAN OF CARE
Problem: PHYSICAL THERAPY ADULT  Goal: Performs mobility at highest level of function for planned discharge setting. See evaluation for individualized goals. Description: Treatment/Interventions: ADL retraining, Functional transfer training, LE strengthening/ROM, Elevations, Therapeutic exercise, Endurance training, Patient/family training, Cognitive reorientation, Equipment eval/education, Bed mobility, Gait training, Compensatory technique education, Spoke to nursing, OT, Spoke to case management, Spoke to advanced practitioner          See flowsheet documentation for full assessment, interventions and recommendations. 9/4/2023 1535 by Lesley Martínez, ARIANA  Note: Prognosis: Good  Problem List: Decreased strength, Decreased range of motion, Decreased endurance, Impaired balance, Decreased mobility, Decreased coordination, Decreased cognition, Impaired judgement, Decreased safety awareness, Obesity           PT Discharge Recommendation: Post acute rehabilitation services (return to [facility] for completion of rehab/ comprehensive rehab program post recent CVA)    See flowsheet documentation for full assessment. 9/4/2023 1535 by Lesley Martínez, ARIANA  Note: Prognosis: Good  Problem List: Decreased strength, Decreased range of motion, Decreased endurance, Impaired balance, Decreased mobility, Decreased coordination, Decreased cognition, Impaired judgement, Decreased safety awareness, Obesity      Pt is 70 y.o. female seen for PT evaluation s/p admit to [facility] on 9/3/2023. Pt presenting  From [facility] w/ R sided weakness while working with therapy at [facility] dx'd w/ stroke-like symptoms. "Per neurology, recrudescence of previous stroke likely due to orthostasis while working with physical therapy given significant carotid artery and intracranial artery disease." MRI brain ordered and pending.  Pt  has a past medical history of Arthritis, Chronic kidney disease, Diabetes mellitus ([address]), Endometriosis, High cholesterol, Hypertension, Kidney disease, chronic, stage III (moderate, EGFR 30-59 ml/min) (HCC), Lumbar disc herniation, Neuropathy, Peripheral vascular disease ([address]), Pneumonia, Shoulder injury, Spinal stenosis, and Stroke ([address]) (2015). PT orders for eval and tx for mobility and to assist w/ d/c planning recommendations. Due to acute medical issues, ongoing medical workup for primary dx; pain, fall risk, increased reliance on more restrictive AD compared to baseline;  decreased activity tolerance compared to baseline, increased assistance needed from caregiver at current time, continuous telemetry monitoring, multiple lines, decline in overall functional mobility status; health management issues; note unstable clinical picture (high complexity)   Prior to admission, pt was receiving rehab at [facility] following recent admission for CVA w/ aphasia and R hemiparesis. At baseline tp lives [facility] w/ ramp entry and was living w/ son and was MI/ I w/ all mobility w/o use of AD and indep w/ ADL's/ IADL's  Currently pt  is requiring modA A for bed skills; Darina Closs for functional transfers and modAx2 for ambulation w/ HHA and w/ trial of RW w/ severly ataxic gait R hemiparesis and knee buckling. Pt presents functioning below baseline and currently w/ overall mobility deficits 2* to: decreased LE strength/AROM; R hemiparesis; aphasia;  limited flexibility;  generalized weakness/ deconditioning; decreased endurance; decreased activity tolerance; decreased coordination; impaired balance; gait deviations; ataxia; R sideded coordination/ motor planning deficits  decreased safety awareness; SOB/MICHAEL; fatigue; impaired safety and judgement; limited insight into current deficits; bed/ chair alarms; multiple linesPt currently at risk for falls.   (Please find additional objective findings from PT assessment regarding body systems outlined above.) Pt will continue to benefit from skilled PT interventions to address stated impairments; to maximize functional potential; for ongoing pt/ family training; and DME needs. PT is currently recommending d/c back  to inpatient rehab setting when medically cleared for safety and to maximize functional potential prior to d/c home. Pt/ family agreeable to plan and goals as stated on evaluation. PT Discharge Recommendation: Post acute rehabilitation services (return to [facility] for completion of rehab/ comprehensive rehab program post recent CVA)    See flowsheet documentation for full assessment.

## 2023-09-04 NOTE — PLAN OF CARE
Problem: INFECTION - ADULT  Goal: Absence or prevention of progression during hospitalization  Description: INTERVENTIONS:  - Assess and monitor for signs and symptoms of infection  - Monitor lab/diagnostic results  - Monitor all insertion sites, i.e. indwelling lines, tubes, and drains  - Monitor endotracheal if appropriate and nasal secretions for changes in amount and color  - [place] appropriate cooling/warming therapies per order  - Administer medications as ordered  - Instruct and encourage patient and family to use good hand hygiene technique  - Identify and instruct in appropriate isolation precautions for identified infection/condition  Outcome: Progressing  Goal: Absence of fever/infection during neutropenic period  Description: INTERVENTIONS:  - Monitor WBC    Outcome: Progressing

## 2023-09-04 NOTE — ASSESSMENT & PLAN NOTE
Patient with left carotid artery disease stroke  · CTA head and neck reveals extensive intracranial atherosclerotic disease stenosis extensive disease bilateral carotid arteries  · Continue aspirin statin Brilinta  · Patient is waiting for well-controlled diabetic state for operative intervention with vascular surgery  · Outpatient vascular surgery follow-up

## 2023-09-04 NOTE — SPEECH THERAPY NOTE
Speech Language/Pathology  Speech-Language Pathology Bedside Swallow Evaluation      Patient Name: Branden Coates    [id number]'[id number] Date: 9/4/2023     Problem List  Principal Problem:    Stroke-like symptoms  Active Problems:    Stage 3 chronic kidney disease ([facility])    Obesity (BMI 30-39. 9)    Hyperlipidemia, unspecified    Symptomatic carotid artery stenosis, left    Depression, recurrent (HCC)    Diabetes ([address])    Aortoiliac occlusive disease ([address])    Aphasia as late effect of cerebrovascular accident (CVA)      Past Medical History  Past Medical History:   Diagnosis Date   • Arthritis    • Chronic kidney disease    • Diabetes mellitus ([address])    • Endometriosis    • High cholesterol    • Hypertension    • Kidney disease, chronic, stage III (moderate, EGFR 30-59 ml/min) ([facility])    • Lumbar disc herniation    • Neuropathy    • Peripheral vascular disease ([facility])    • Pneumonia    • Shoulder injury     left   • Spinal stenosis    • Stroke ([address]) 2015    Memory loss       Past Surgical History  Past Surgical History:   Procedure Laterality Date   • CARDIAC CATHETERIZATION     • COLONOSCOPY     • FL RETROGRADE PYELOGRAM  4/6/2020   • FRACTURE SURGERY Right     ankle   • OVARIAN CYST SURGERY     • DC CYSTO BLADDER W/URETERAL CATHETERIZATION Bilateral 4/6/2020    Procedure: CYSTOSCOPY WITH RETROGRADE PYELOGRAM;  Surgeon: Radha Lopez MD;  [facility];  Service: Urology   • DC CYSTO W/INSERT URETERAL STENT Right 4/6/2020    Procedure: INSERTION STENT URETERAL;  Surgeon: Radha Lopez MD;  [facility];  Service: Urology   • DC CYSTO W/REMOVAL OF TUMORS SMALL N/A 4/6/2020    Procedure: TRANSURETHRAL RESECTION OF BLADDER TUMOR (TURBT); Surgeon: Radha Lopez MD;  [facility];  Service: Urology   • ROTATOR CUFF REPAIR Left    • TONSILLECTOMY       Current Medical:  16-year-old female is presenting from her acute care rehab facility with concerns for new onset right-sided weakness and flaccidity. Patient was reportedly last seen well at 10:30 AM by staff. They report that she has been [place] from a prior stroke. She did have ongoing concerns for aphasia even before the events today. Patient was reportedly in the middle of physical therapy whenever she suddenly could not lift her right arm or move her right leg. Patient was sent in via EMS where EMS reports a blood glucose in the 140s. They reported that in route to the hospital patient began moving her right arm and right leg again. At time of my examination, patient is able to keep her right arm up in the air when I lift it for her, and she is moving the toes on her right leg but is unable to lift against gravity currently. She does seem to have some bilateral hemianopsia as well. She does have occasional trouble with word finding but otherwise all symptoms seem chronic at this time. She does have an improving examination throughout initial evaluation. CTA head/neck  IMPRESSION:  Stable advanced atherosclerotic disease throughout the cervical and intracranial vasculature with multiple areas of severe stenosis most prominent within the internal carotid artery bulb bilaterally.   Advanced atherosclerotic disease of the distal left vertebral artery with occlusion proximal to the vertebrobasilar junction and severe high-grade stenosis of the proximal basilar. Additional areas of moderate to advanced atherosclerotic disease   scattered throughout the cervical and intracranial vasculature. Overall no significant change when compared with the prior examination.       Current Cognitive Status/Orientation:  Awake/alert  Person: able to state name  Place: able to state hospital with choice of two  Time/Date: -  Situation: -    Pt was assessed with the Western Aphasia Battery Revised Bedside with the following findings/results:      Spontaneous Speech: Content Score: 1/10  How are you today? 1/1  What is your full address? 0/2  Why are you here? 0/2  Tell me what is happening in this picture? 0/5    Spontaneous Speech: Fluency Score2/10    10 = Normal Speech 5: Halting, paraphasic, but more complete sentences; significant word-finding difficulty    9 = Some hesitations and word-finding difficulty  4: Agrammatic, effortful; verb-noun phrases, but only one or two propositional sentences    8 = Circumlocutory, fluent speech w/ semantic paraphasia and word-finding difficulty 3: Mostly unintelligible, low-volume mumbling; some single words    7 = Fluent phonemic jargon, semblance to [place] syntax and phonology  2: Single words, often paraphasias, effortful and hesitant    6 = Logopenic but normal syntax; few, if any, paraphasias; significant word-finding difficulty 1: Recurrent, stereotypic utterances w/ meaningful intonation     0: No words or short, meaningless utterances         Auditory Verbal Comprehension: Yes/No Questions: 7/10  Simple:5  Complex:2    Sequential Commands: 0/10    Repetition: 8/10    Object Naming: /10  Incomplete today    Reading:   Reading Score: **/10  Comprehension: **/5    Writing: **/10  Name: **/1  Address: **/2  Sentence to Dictation: **/2  Sentence Formulation: **/5      Bedside Aphasia Score:   (Sum the Content, Fluency, Auditory Verbal Comprehension, Sequential Commands, Repetition, and Object Naming scores. Divide the sum by 6; then multiply by 10 to obtain.)     Bedside Language Score:   (Sum the Content, Fluency, Auditory Verbal Comprehension, Sequential Commands, Repetition, Object Naming, Reading and Writing scores.  Divide the sum by 8; then multiply by 10 to obtain.)    Bedside Aphasia Classification Criteria:   (Compare the patient's Fluency, Auditory Verbal Comprehension, and Repetition scores to the the three scores associated with each aphasia type)      Aphasia Type Scores    Fluency Auditory Verbal Comprehension Repetition    Global <5 <4 <5   Broca's <5 >3 <8   Isolation <5 <4 >4   Transcortical Motor <5 >3 >7 Wernicke's >4 <7 <8   Transcortical Sensory >4 <7 >7   Conduction >4 >6 <7   Anomic  >4 >6 >6       MOTOR SPEECH:  Oral Mechanism Exam: fair    Dysarthria:              Imprecise artic: not noted              Rate: slow               Nasality: not noted              Breath support: wfl              Volume: wfl     Symptoms noted:  Phonemic paraphasias to no approximations and jargon at times  Aware/not aware/partially aware of deficits:       Summary/Impression:   Speech language process begun, WAB begun with pt. Pt was taken for a test in the middle of it. Highly  Suspected severe expressive and mildly receptive deficits w/ ability to name in isolation for simple items, use carrier phrase with other attempts at naming and during attempts at conversation. She has difficulty following command verbally w/o modeled cue. I am not certain how far from baseline this is from the previous stroke she had. Treatment Recommended and Frequency:      Therapy Prognosis: fair  Prognosis considerations: motivation        Goals:    for now continue with WAB for scoring.     Education Initiated with:   Pt

## 2023-09-04 NOTE — PHYSICAL THERAPY NOTE
PHYSICAL THERAPY EVALUATION  NAME:  Ade Michelle  DATE: 09/04/23    AGE:   70 y.o. Mrn:   [id number]  ADMIT DX:  Primary hypertension [I10]  CVA (cerebral vascular accident) ([address]) [I63.9]  Stroke-like symptoms [R29.90]    Past Medical History:   Diagnosis Date    Arthritis     Chronic kidney disease     Diabetes mellitus ([address])     Endometriosis     High cholesterol     Hypertension     Kidney disease, chronic, stage III (moderate, EGFR 30-59 ml/min) (HCC)     Lumbar disc herniation     Neuropathy     Peripheral vascular disease (HCC)     Pneumonia     Shoulder injury     left    Spinal stenosis     Stroke ([address]) 2015    Memory loss     Past Surgical History:   Procedure Laterality Date    CARDIAC CATHETERIZATION      COLONOSCOPY      FL RETROGRADE PYELOGRAM  4/6/2020    FRACTURE SURGERY Right     ankle    OVARIAN CYST SURGERY      GA CYSTO BLADDER W/URETERAL CATHETERIZATION Bilateral 4/6/2020    Procedure: CYSTOSCOPY WITH RETROGRADE PYELOGRAM;  Surgeon: Beatriz Goyal MD;  [facility];  Service: Urology    GA CYSTO W/INSERT URETERAL STENT Right 4/6/2020    Procedure: INSERTION STENT URETERAL;  Surgeon: Beatriz Goyal MD;  [facility];  Service: Urology    GA CYSTO W/REMOVAL OF TUMORS SMALL N/A 4/6/2020    Procedure: TRANSURETHRAL RESECTION OF BLADDER TUMOR (TURBT); Surgeon: Beatriz Goyal MD;  [facility];  Service: Urology    ROTATOR CUFF REPAIR Left     TONSILLECTOMY         Length Of Stay: 1  PHYSICAL THERAPY EVALUATION :    09/04/23 0955   PT Last Visit   PT Visit Date 09/04/23   Note Type   Note type Evaluation   Pain Assessment   Pain Assessment Tool 0-10   Pain Score No Pain   Restrictions/Precautions   Weight Bearing Precautions Per Order No   Braces or Orthoses   (none)   Other Precautions Impulsive;Cognitive; Chair Alarm; Bed Alarm; Fall Risk  (R hemiparesis/ coordination + motor planning deficits; aphasia)   Home Living   Type of [address] [place] [address] Dr One level;Ramped entrance   [place] Shower/Tub Walk-in shower   Bathroom Toilet Raised   [id number]; Wheelchair-manual   Prior Function   Level of [place] Independent with ADLs; Independent with functional mobility   Lives With Family  (son)   Jagjit Alegre Help From Family   IADLs Independent with driving; Independent with meal prep; Independent with medication management   Vocational Retired   Comments Prior to admission, pt was receiving rehab at [facility] following recent admission for CVA w/ aphasia and R hemiparesis. At baseline tp lives [facility] w/ ramp entry and was living w/ son and was MI/ I w/ all mobility w/o use of AD and indep w/ ADL's/ IADL's   Cognition   Overall Cognitive Status Impaired   Attention Attends with cues to redirect   Orientation Level Oriented to person   Memory Decreased recall of precautions;Decreased recall of recent events;Decreased short term memory   Following Commands Follows one step commands with increased time or repetition   RUE Assessment   RUE Assessment X  (decreased AROM and strength)   LUE Assessment   LUE Assessment WFL   RLE Assessment   RLE Assessment X   Strength RLE   RLE Overall Strength 3+/5   LLE Assessment   LLE Assessment WFL  (5/5)   Coordination   Movements are Fluid and Coordinated 0   Coordination and Movement Description severely ataxic gait w/ impaired foot placement; nbarrow NALINI > occasional scissoring. (+) knee buckling   Finger to Nose & Finger to Finger  Impaired  (R)   Heel to [place] Impaired  (R)   Light Touch   RLE Light Touch   (difficulty assessing 2* aphasia/ inconsistent)   Proprioception   RLE Proprioception Impaired   Bed Mobility   Supine to Sit 3  Moderate assistance   Additional items Assist x 1; Increased time required; Impulsive   Sit to Supine 3  Moderate assistance   Additional items Assist x 2; Increased time required;Verbal cues   Additional Comments supine + repositioning onto stretcher for testing   Transfers   Sit to Stand 3  Moderate assistance   Additional items Assist x 1; Increased time required;Verbal cues   Stand to Sit 3  Moderate assistance   Additional items Assist x 1; Increased time required;Verbal cues   Ambulation/Elevation   Gait pattern R Foot drag;R Hemiparesis;R Knee Damian; Improper Weight shift;Narrow NALINI; Decreased foot clearance;Decreased R stance;Scissoring;Shuffling; Inconsistent kt; Excessively slow   Gait Assistance 3  Moderate assist   Additional items Assist x 1;Assist x 2   Assistive Device Rolling walker;None   Distance 4' w/ RW and modA x2- poor  + coordination on R; transitioned to HHA x2 w/ pt requiring modA for advance + placement of R LE; stabilization of R knee during stance phase 2* buckling. total of 15' to stretcher in [place] completed- pt fatigues easily and becomes tearful w/ performance and deficits- PT offering reassurances which pt was calmed and appreciative of. - left in care of transport aides for testing   Balance   Static Sitting Poor +   Dynamic Sitting Poor   Static Standing Poor -   Dynamic Standing Poor -   Ambulatory Poor -   Endurance Deficit   Endurance Deficit Yes   Activity Tolerance   Activity Tolerance Patient limited by fatigue;Treatment limited secondary to agitation   Medical Staff Made Aware OT for medical complexity Gracie   Nurse Made Aware yes   Assessment    Pt is 70 y.o. female seen for PT evaluation s/p admit to [address] [place] [address] on 9/3/2023. Pt presenting  From [facility] w/ R sided weakness while working with therapy at [facility] dx'd w/ stroke-like symptoms. "Per neurology, recrudescence of previous stroke likely due to orthostasis while working with physical therapy given significant carotid artery and intracranial artery disease." MRI brain ordered and pending.      Pt  has a past medical history of Arthritis, Chronic kidney disease, Diabetes mellitus ([address]), Endometriosis, High cholesterol, Hypertension, Kidney disease, chronic, stage III (moderate, EGFR 30-59 ml/min) (HCC), Lumbar disc herniation, Neuropathy, Peripheral vascular disease ([address]), Pneumonia, Shoulder injury, Spinal stenosis, and Stroke ([address]) (2015). PT orders for eval and tx for mobility and to assist w/ d/c planning recommendations. Due to acute medical issues, ongoing medical workup for primary dx; pain, fall risk, increased reliance on more restrictive AD compared to baseline;  decreased activity tolerance compared to baseline, increased assistance needed from caregiver at current time, continuous telemetry monitoring, multiple lines, decline in overall functional mobility status; health management issues; note unstable clinical picture (high complexity)       Prior to admission, pt was receiving rehab at [facility] following recent admission for CVA w/ aphasia and R hemiparesis. At baseline tp lives [facility] w/ ramp entry and was living w/ son and was MI/ I w/ all mobility w/o use of AD and indep w/ ADL's/ IADL's  Currently pt  is requiring modA A for bed skills; Lugenia Petties for functional transfers and modAx2 for ambulation w/ HHA and w/ trial of RW w/ severly ataxic gait R hemiparesis and knee buckling. Pt presents functioning below baseline and currently w/ overall mobility deficits 2* to: decreased LE strength/AROM; R hemiparesis; aphasia;  limited flexibility;  generalized weakness/ deconditioning; decreased endurance; decreased activity tolerance; decreased coordination; impaired balance; gait deviations; ataxia; R sideded coordination/ motor planning deficits  decreased safety awareness; SOB/MICHAEL; fatigue; impaired safety and judgement; limited insight into current deficits; bed/ chair alarms; multiple linesPt currently at risk for falls.   (Please find additional objective findings from PT assessment regarding body systems outlined above.) Pt will continue to benefit from skilled PT interventions to address stated impairments; to maximize functional potential; for ongoing pt/ family training; and DME needs. PT is currently recommending d/c back  to inpatient rehab setting when medically cleared for safety and to maximize functional potential prior to d/c home. Pt/ family agreeable to plan and goals as stated on evaluation. Prognosis Good   Problem List Decreased strength;Decreased range of motion;Decreased endurance; Impaired balance;Decreased mobility; Decreased coordination;Decreased cognition; Impaired judgement;Decreased safety awareness; Obesity   Goals   Patient Goals walk- get better   [facility] Expiration Date 09/18/23   Short Term Goal #1 In 14 days pt will complete: 1) Bed mobility skills with minAx1 to facilitate safe return to previous living environment and decrease burden on caregivers. 2) Functional transfers with minAx1  to facilitate safe return to previous living environment  3) Ambulation with least restrictive AD minAx1 25'x 25' ' without LOB and stable vitals for safe ambulation in home/ community environment. 4) Stair training up/ down 2 step/s with  rail/s  and modA for safe access to previous living environment and to increase community access. 5) Improve balance by 1 grade in order to decrease fall risk. 6) Improve R LE strength grades by 1 to increase independence w/ all functional mobility, transfers and gait. 7) PT for ongoing pt and family education; DME needs and D/C planning to promote highest level of function in least restrictive environment. Plan   Treatment/Interventions ADL retraining;Functional transfer training;LE strengthening/ROM; Elevations; Therapeutic exercise; Endurance training;Patient/family training;Cognitive reorientation;Equipment eval/education; Bed mobility;Gait training; Compensatory technique education;Spoke to nursing;OT;Spoke to case management;Spoke to advanced practitioner   PT Frequency 3-5x/wk   Recommendation   PT Discharge Recommendation Post acute rehabilitation services  (return to [facility] for completion of rehab/ comprehensive rehab program post recent CVA)   [facility] Mobility Inpatient   Turning in Flat Bed Without Bedrails 2   Lying on Back to Sitting on Edge of Flat Bed Without Bedrails 2   Moving Bed to Chair 2   Standing Up From Chair Using Arms 2   Walk in Room 2   Climb 3-5 Stairs With Railing 1   Basic Mobility Inpatient Raw Score 11   Basic Mobility Standardized Score 30.25   Highest Level Of Mobility   -[facility] Goal 4: Move to chair/commode   -HLM Achieved 6: Walk 10 steps or more     The patient's AM-PAC Basic Mobility Inpatient Short Form Raw Score is 11. A Raw score of less than or equal to 16 suggests the patient may benefit from discharge to post-acute rehabilitation services. Please also refer to the recommendation of the Physical Therapist for safe discharge planning.

## 2023-09-04 NOTE — PHYSICAL THERAPY NOTE
PHYSICAL THERAPY TREATMENT  NAME:  Rocky Mcarthur  DATE: 09/04/23    AGE:   70 y.o. Mrn:   [id number]  ADMIT DX:  Primary hypertension [I10]  CVA (cerebral vascular accident) ([address]) [I63.9]  Stroke-like symptoms [R29.90]    Past Medical History:   Diagnosis Date    Arthritis     Chronic kidney disease     Diabetes mellitus ([address])     Endometriosis     High cholesterol     Hypertension     Kidney disease, chronic, stage III (moderate, EGFR 30-59 ml/min) (HCC)     Lumbar disc herniation     Neuropathy     Peripheral vascular disease (HCC)     Pneumonia     Shoulder injury     left    Spinal stenosis     Stroke ([address]) 2015    Memory loss     Past Surgical History:   Procedure Laterality Date    CARDIAC CATHETERIZATION      COLONOSCOPY      FL RETROGRADE PYELOGRAM  4/6/2020    FRACTURE SURGERY Right     ankle    OVARIAN CYST SURGERY      LA CYSTO BLADDER W/URETERAL CATHETERIZATION Bilateral 4/6/2020    Procedure: CYSTOSCOPY WITH RETROGRADE PYELOGRAM;  Surgeon: Afia Samaniego MD;  [facility];  Service: Urology    LA CYSTO W/INSERT URETERAL STENT Right 4/6/2020    Procedure: INSERTION STENT URETERAL;  Surgeon: Afia Samaniego MD;  [facility];  Service: Urology    LA CYSTO W/REMOVAL OF TUMORS SMALL N/A 4/6/2020    Procedure: TRANSURETHRAL RESECTION OF BLADDER TUMOR (TURBT); Surgeon: Afia Samaniego MD;  [facility];  Service: Urology    ROTATOR CUFF REPAIR Left     TONSILLECTOMY         Length Of Stay: 1     09/04/23 1115   PT Last Visit   PT Visit Date 09/04/23   Note Type   Note Type Treatment   Pain Assessment   Pain Assessment Tool 0-10   Pain Score No Pain   Restrictions/Precautions   Weight Bearing Precautions Per Order No   Other Precautions Impulsive;Cognitive; Chair Alarm; Bed Alarm;Multiple lines; Fall Risk  (aphasia)   General   Chart Reviewed Yes   Cognition   Overall Cognitive Status Impaired   Arousal/Participation Alert; Cooperative   Attention Attends with cues to redirect Orientation Level Oriented to person   Memory Decreased recall of precautions;Decreased recall of recent events   Following Commands Follows one step commands with increased time or repetition   Subjective   Subjective PT initiated tx session w/ pt waving from room and indicated via gestures + eventually words "bathroom") that she required use of toilet. Transfers   Stand pivot 3  Moderate assistance   Additional items Assist x 1   Toilet transfer 3  Moderate assistance   Additional items Assist x 1  (to from commode for urination/ R hemiparesis)   Additional Comments xfer to from commode via SPV w/ max cues for technique and hand placement R knee blocked   Balance   Static Sitting Fair -   Static Standing Poor   Dynamic Standing Poor   Ambulatory Poor   Endurance Deficit   Endurance Deficit Yes   Exercises   Balance training  seated + standing w/ HHA   Assessment   Problem List Decreased strength;Decreased range of motion;Decreased endurance; Impaired balance;Decreased mobility; Decreased coordination;Decreased cognition; Impaired judgement;Decreased safety awareness; Impaired sensation;Obesity   Assessment PT initiated tx session w/ pt requesing / motioning to transfer to toilet (+) call bell answered. Pt required modA x1 for SPV xfer to and from commode for urination. Pt aphasic w/ difficulty work finding but does gesture and use some words for effective communication. Pt very frustrated adn at times tearful - easiliy redirected to task. states "thank you" following assist for toileting. up in recliner w/ all needs in reach adn alarm intact post session. pt will benefit from return to rehab on d/c   Goals   Patient Goals to get better   [facility] Expiration Date 09/18/23   Plan   Treatment/Interventions ADL retraining;Functional transfer training;LE strengthening/ROM; Elevations; Therapeutic exercise; Endurance training;Cognitive reorientation;Patient/family training;Equipment eval/education; Bed mobility;Gait training; Compensatory technique education;Spoke to nursing;Spoke to case management;Spoke to advanced practitioner;OT   Progress Progressing toward goals   PT Frequency 3-5x/wk   Recommendation   PT Discharge Recommendation Post acute rehabilitation services   AM-PAC Basic Mobility Inpatient   Turning in Flat Bed Without Bedrails 2   Lying on Back to Sitting on Edge of Flat Bed Without Bedrails 2   Moving Bed to Chair 2   Standing Up From Chair Using Arms 2   Walk in Room 2   Climb 3-5 Stairs With Railing 1   Basic Mobility Inpatient Raw Score 11   Basic Mobility Standardized Score 30.25   Highest Level Of Mobility   JH-HLM Goal 4: Move to chair/commode   JH-HLM Achieved 4: Move to chair/commode       The patient's AM-PAC Basic Mobility Inpatient Short Form Raw Score is 11. A Raw score of less than or equal to 16 suggests the patient may benefit from discharge to post-acute rehabilitation services. Please also refer to the recommendation of the Physical Therapist for safe discharge planning.             Ana Lilia Zuniga, PT

## 2023-09-04 NOTE — PLAN OF CARE
Problem: PHYSICAL THERAPY ADULT  Goal: Performs mobility at highest level of function for planned discharge setting. See evaluation for individualized goals. Description: Treatment/Interventions: ADL retraining, Functional transfer training, LE strengthening/ROM, Elevations, Therapeutic exercise, Endurance training, Patient/family training, Cognitive reorientation, Equipment eval/education, Bed mobility, Gait training, Compensatory technique education, Spoke to nursing, OT, Spoke to case management, Spoke to advanced practitioner          See flowsheet documentation for full assessment, interventions and recommendations. Outcome: Progressing  Note: Prognosis: Good  Problem List: Decreased strength, Decreased range of motion, Decreased endurance, Impaired balance, Decreased mobility, Decreased coordination, Decreased cognition, Impaired judgement, Decreased safety awareness, Impaired sensation, Obesity  Assessment: PT initiated tx session w/ pt requesing / motioning to transfer to toilet (+) call bell answered. Pt required modA x1 for SPV xfer to and from commode for urination. Pt aphasic w/ difficulty work finding but does gesture and use some words for effective communication. Pt very frustrated adn at times tearful - easiliy redirected to task. states "thank you" following assist for toileting. up in recliner w/ all needs in reach adn alarm intact post session. pt will benefit from return to rehab on d/c        PT Discharge Recommendation: Post acute rehabilitation services    See flowsheet documentation for full assessment.

## 2023-09-04 NOTE — PLAN OF CARE
Problem: OCCUPATIONAL THERAPY ADULT  Goal: Performs self-care activities at highest level of function for planned discharge setting. See evaluation for individualized goals. Description: Treatment Interventions: ADL retraining, Functional transfer training, UE strengthening/ROM, Endurance training, Cognitive reorientation, Patient/family training, Equipment evaluation/education, Compensatory technique education, Activityengagement, Energy conservation          See flowsheet documentation for full assessment, interventions and recommendations. Note: Limitation: Decreased ADL status, Decreased UE strength, Decreased cognition, Decreased Safe judgement during ADL, Decreased UE ROM, Decreased endurance, Decreased fine motor control, Decreased high-level ADLs  Prognosis: Fair  Assessment: Pt is a 71 yo female seen for OT eval s/p adm to \[facility]\"" on 9/3/23 w/ R sided weakness while working with therapy at [facility] dx'd w/ stroke-like symptoms. "Per neurology, recrudescence of previous stroke likely due to orthostasis while working with physical therapy given significant carotid artery and intracranial artery disease." MRI brain ordered and pending. Pt  has a past medical history of Arthritis, Chronic kidney disease, Diabetes mellitus ([address]), Endometriosis, High cholesterol, Hypertension, Kidney disease, chronic, stage III (moderate, EGFR 30-59 ml/min) (HCC), Lumbar disc herniation, Neuropathy, Peripheral vascular disease ([address]), Pneumonia, Shoulder injury, Spinal stenosis, and Stroke ([address]) (2015). Pt with active OT orders and activity as tolerated orders. Pt is retired and resides w/ her son in [facility] w/ ramped entrance. Pt admitted to \[facility]\"" from [facility] after having recent stroke. Prior to stroke, Pt was I w/ ADLS and IADLS, , & required no use of DME PTA. Pt is currently demonstrating the following occupational deficits:  Mod A UB self care, Max A LB self care, Max A toileting, Mod A transfers, Mod Ax2 functional mobility w/ RW vs HHA. These deficits that are impacting pt's baseline areas of occupation are a result of the following impairments: pain, endurance, activity tolerance, functional mobility, forward functional reach, balance, functional standing tolerance, unsupportive home environment, decreased I w/ ADLS/IADLS, strength, ROM, aphasia, cognitive impairments, decreased safety awareness, decreased insight into deficits, ataxia, impaired fine motor skills and coordination deficits. The following Occupational Performance Areas to address include: grooming, bathing/shower, toilet hygiene, dressing, health maintenance, functional mobility, community mobility and clothing management. Based on the aforementioned OT evaluation, functional performance deficits, and assessments, pt has been identified as a high complexity evaluation. Recommend inpatient rehab  upon D/C. The patient's raw score on the AM-PAC Daily Activity Inpatient Short Form is 14. A raw score of less than 19 suggests the patient may benefit from discharge to post-acute rehabilitation services. Please refer to the recommendation of the Occupational Therapist for safe discharge planning.  Pt to continue to benefit from acute immediate OT services to address the following goals 3-5x/week to  w/in 10-14 days:     OT Discharge Recommendation: Post acute rehabilitation services (return to rehab)

## 2023-09-05 ENCOUNTER — APPOINTMENT (OUTPATIENT)
Dept: RADIOLOGY | Facility: HOSPITAL | Age: 78
DRG: 034 | End: 2023-09-05
Payer: COMMERCIAL

## 2023-09-05 ENCOUNTER — TELEPHONE (OUTPATIENT)
Dept: VASCULAR SURGERY | Facility: CLINIC | Age: 78
End: 2023-09-05

## 2023-09-05 PROBLEM — I63.9 ACUTE CVA (CEREBROVASCULAR ACCIDENT) (HCC): Status: ACTIVE | Noted: 2023-09-03

## 2023-09-05 PROBLEM — I10 PRIMARY HYPERTENSION: Status: ACTIVE | Noted: 2023-09-05

## 2023-09-05 LAB
GLUCOSE SERPL-MCNC: 123 MG/DL (ref 65–140)
GLUCOSE SERPL-MCNC: 127 MG/DL (ref 65–140)
GLUCOSE SERPL-MCNC: 169 MG/DL (ref 65–140)
GLUCOSE SERPL-MCNC: 78 MG/DL (ref 65–140)
MRSA NOSE QL CULT: NORMAL

## 2023-09-05 PROCEDURE — 97116 GAIT TRAINING THERAPY: CPT

## 2023-09-05 PROCEDURE — 70551 MRI BRAIN STEM W/O DYE: CPT

## 2023-09-05 PROCEDURE — 99223 1ST HOSP IP/OBS HIGH 75: CPT | Performed by: PHYSICIAN ASSISTANT

## 2023-09-05 PROCEDURE — 97530 THERAPEUTIC ACTIVITIES: CPT

## 2023-09-05 PROCEDURE — 99233 SBSQ HOSP IP/OBS HIGH 50: CPT | Performed by: INTERNAL MEDICINE

## 2023-09-05 PROCEDURE — 82948 REAGENT STRIP/BLOOD GLUCOSE: CPT

## 2023-09-05 PROCEDURE — 99232 SBSQ HOSP IP/OBS MODERATE 35: CPT | Performed by: PSYCHIATRY & NEUROLOGY

## 2023-09-05 RX ORDER — ESCITALOPRAM OXALATE 10 MG/1
10 TABLET ORAL DAILY
Status: DISCONTINUED | OUTPATIENT
Start: 2023-09-05 | End: 2023-09-09 | Stop reason: HOSPADM

## 2023-09-05 RX ORDER — ACETAMINOPHEN 325 MG/1
650 TABLET ORAL EVERY 6 HOURS PRN
Status: DISCONTINUED | OUTPATIENT
Start: 2023-09-05 | End: 2023-09-09 | Stop reason: HOSPADM

## 2023-09-05 RX ORDER — GLIPIZIDE 5 MG/1
5 TABLET ORAL
Qty: 90 TABLET | Refills: 2 | Status: SHIPPED | OUTPATIENT
Start: 2023-09-05 | End: 2023-09-09

## 2023-09-05 RX ORDER — LOSARTAN POTASSIUM 50 MG/1
100 TABLET ORAL DAILY
Status: DISCONTINUED | OUTPATIENT
Start: 2023-09-05 | End: 2023-09-06

## 2023-09-05 RX ORDER — AMLODIPINE BESYLATE 5 MG/1
5 TABLET ORAL DAILY
Status: DISCONTINUED | OUTPATIENT
Start: 2023-09-05 | End: 2023-09-07

## 2023-09-05 RX ORDER — INSULIN GLARGINE 100 [IU]/ML
10 INJECTION, SOLUTION SUBCUTANEOUS
Status: DISCONTINUED | OUTPATIENT
Start: 2023-09-05 | End: 2023-09-09

## 2023-09-05 RX ADMIN — ASPIRIN 81 MG CHEWABLE TABLET 81 MG: 81 TABLET CHEWABLE at 08:30

## 2023-09-05 RX ADMIN — ALLOPURINOL 100 MG: 100 TABLET ORAL at 08:30

## 2023-09-05 RX ADMIN — LIDOCAINE 2 PATCH: 700 PATCH TOPICAL at 08:30

## 2023-09-05 RX ADMIN — INSULIN LISPRO 3 UNITS: 100 INJECTION, SOLUTION INTRAVENOUS; SUBCUTANEOUS at 08:30

## 2023-09-05 RX ADMIN — HEPARIN SODIUM 5000 UNITS: 5000 INJECTION INTRAVENOUS; SUBCUTANEOUS at 22:56

## 2023-09-05 RX ADMIN — AMLODIPINE BESYLATE 5 MG: 5 TABLET ORAL at 10:38

## 2023-09-05 RX ADMIN — INSULIN LISPRO 3 UNITS: 100 INJECTION, SOLUTION INTRAVENOUS; SUBCUTANEOUS at 12:48

## 2023-09-05 RX ADMIN — HEPARIN SODIUM 5000 UNITS: 5000 INJECTION INTRAVENOUS; SUBCUTANEOUS at 06:40

## 2023-09-05 RX ADMIN — ESCITALOPRAM OXALATE 10 MG: 10 TABLET ORAL at 14:53

## 2023-09-05 RX ADMIN — POLYETHYLENE GLYCOL 3350 17 G: 17 POWDER, FOR SOLUTION ORAL at 08:30

## 2023-09-05 RX ADMIN — LABETALOL HYDROCHLORIDE 300 MG: 200 TABLET, FILM COATED ORAL at 22:56

## 2023-09-05 RX ADMIN — PRAVASTATIN SODIUM 80 MG: 80 TABLET ORAL at 16:47

## 2023-09-05 RX ADMIN — TICAGRELOR 90 MG: 90 TABLET ORAL at 22:57

## 2023-09-05 RX ADMIN — ACETAMINOPHEN 650 MG: 325 TABLET, FILM COATED ORAL at 02:48

## 2023-09-05 RX ADMIN — HEPARIN SODIUM 5000 UNITS: 5000 INJECTION INTRAVENOUS; SUBCUTANEOUS at 14:53

## 2023-09-05 RX ADMIN — LABETALOL HYDROCHLORIDE 300 MG: 200 TABLET, FILM COATED ORAL at 10:38

## 2023-09-05 RX ADMIN — LOSARTAN POTASSIUM 100 MG: 50 TABLET, FILM COATED ORAL at 08:47

## 2023-09-05 RX ADMIN — INSULIN GLARGINE 10 UNITS: 100 INJECTION, SOLUTION SUBCUTANEOUS at 23:21

## 2023-09-05 RX ADMIN — TRAZODONE HYDROCHLORIDE 50 MG: 50 TABLET ORAL at 22:56

## 2023-09-05 RX ADMIN — TICAGRELOR 90 MG: 90 TABLET ORAL at 08:31

## 2023-09-05 NOTE — ASSESSMENT & PLAN NOTE
Patient with left carotid artery disease stroke  · CTA head and neck reveals extensive intracranial atherosclerotic disease stenosis extensive disease bilateral carotid arteries  · Continue aspirin statin Brilinta  · Vascular surgery consulted

## 2023-09-05 NOTE — ASSESSMENT & PLAN NOTE
Lab Results   Component Value Date    HGBA1C 8.5 (H) 08/16/2023       Recent Labs     09/04/23  1216 09/04/23  1727 09/04/23 2058 09/05/23  0603   POCGLU 284* 122 99 123       Blood Sugar Average: Last 72 hrs:  (P) [id number]     Uncontrolled  · Hold glipizide while inpatient  · Continue Lantus at bedtime + SSI   · Monitor Accu-Cheks and adjust as needed   · Avoid hypoglycemia  · Hypoglycemia protocol in place

## 2023-09-05 NOTE — PROGRESS NOTES
Attempted to visit patient when rounding on the unit. Patient needs help for go to bathroom. Called nurse for helping. Patient thanked for stop by her room. 09/05/23 1100   Clinical Encounter Type   Visited With Patient; Health care provider   Referral To Nurse

## 2023-09-05 NOTE — UTILIZATION REVIEW
Initial Clinical Review    Admission: Date/Time/Statement:   Admission Orders (From admission, onward)     Ordered        09/03/23 1520  INPATIENT ADMISSION  Once                      Orders Placed This Encounter   Procedures   • INPATIENT ADMISSION     Standing Status:   Standing     Number of Occurrences:   1     Order Specific Question:   Level of Care     Answer:   Med Surg [16]     Order Specific Question:   Estimated length of stay     Answer:   More than 2 Midnights     Order Specific Question:   Certification     Answer:   I certify that inpatient services are medically necessary for this patient for a duration of greater than two midnights. See H&P and MD Progress Notes for additional information about the patient's course of treatment. ED Arrival Information     Expected   -    Arrival   9/3/2023 12:47    Acuity   Immediate            Means of arrival   Ambulance    Escorted by   [address] [place] [address] EMS    Service   Hospitalist    Admission type   Emergency            Arrival complaint   stroke alert           Chief Complaint   Patient presents with   • STROKE Alert       Initial Presentation: 70 y.o. female to ED presents for  right-sided weakness while she was working with physical therapy at [facility]. Pt was Stroke Alert. Evaluated by Neurology in the ED, Placed on Stroke Pathway. Right-sided weakness is slowly improving, per neurology this could be due to orthostatic hypotension. PMH for Stroke, extensive intracranial, carotid artery disease, uncontrolled diabetes mellitus type 2, hypertension, dyslipidemia, aphasia, obesity, arthritis, chronic kidney disease, history of statin intolerance causing myalgia tolerates pravastatin. Admit Inpatient level for Stroke-like symptoms, Left symptomatic carotid artery stenosis. Stroke Pathway. Continue aspirin, Brilinta. Neurology consult. Permissive hypertension goal -220. Hold losartan amlodipine.  Continue clonidine due to concerns for rebound hypertension if d/c. Monitor blood pressures, avoid hypotension. CTA head and neck reveals extensive intracranial atherosclerotic disease stenosis extensive disease bilateral carotid arteries    9/3  Neurology cons; Stroke-like symptoms. Concern for recrudescence while working with PT (possible orthostatic hypotension or fatigue) vs TIA/CVA. MRI Brain. Continue home ASA/ Brilinta/ statin. Permissive hypertension for 24-48 hts <220/110. Tele monitoring. Bedside swallow eval.     Date: 9/4   Day 2:   Progress notes; MRI Brain. Monitor BMP intermittently. On exam; weakness (mild RUE weakness with  strength 4/5). Date: 9/5   Day 3: Has surpassed a 2nd midnight with active treatments and services, which include Acute CVA/ Management of stroke. Vascular surgery consult. MRI Brain - 1. Scattered multifocal infarctions predominantly in the left occipital lobe as well as in the left frontal and parietal lobes in the watershed territory, slightly increased in size and number from the prior study indicative of trace crescendo   infarctions/progressive infarctions associated with previously present infarctions in these region. 2. Trace T1 high signal in the left occipital lobe is worrisome for petechial microhemorrhage. No significant mass effect. Recommend follow-up repeat head CT in 24 hours to assess for stability.   3. Moderate, chronic microangiopathy.       ED Triage Vitals   Temperature Pulse Respirations Blood Pressure SpO2   09/03/23 1315 09/03/23 1255 09/03/23 1250 09/03/23 1250 09/03/23 1315   98.5 °F (36.9 °C) 66 20 (!) 186/94 99 %      Temp Source Heart Rate Source Patient Position - Orthostatic VS BP Location FiO2 (%)   09/03/23 1315 09/03/23 1255 09/03/23 1315 09/03/23 1600 --   Oral Monitor Lying Right arm       Pain Score       09/03/23 1520       No Pain          Wt Readings from Last 1 Encounters:   09/03/23 71.9 kg (158 lb 6.4 oz)     Additional Vital Signs:   09/04/23 14:42:54 97.9 °F (36.6 °C) 78 18 172/80   Abnormal  111 98 % -- --   09/04/23 1200 -- -- -- -- -- -- None (Room air) --   09/04/23 07:32:20 97.9 °F (36.6 °C) 85 16 182/97  Abnormal   125 97 % None (Room air) Lying   BP: nurse notified at 09/04/23 0732   09/04/23 04:26:06 98 °F (36.7 °C) 81 -- 189/98   Abnormal  128 96 % -- --   09/04/23 04:25:14 -- 80 -- 189/98  Abnormal  128 97 % -- --     09/03/23 18:00:33 97.7 °F (36.5 °C) 86 -- 185/77   Abnormal  113 96 % -- --   09/03/23 1800 97.7 °F (36.5 °C) 81 18 185/77   Abnormal  113 96 % -- --   09/03/23 1700 97.9 °F (36.6 °C) 86 20 169/70 109 97 % None (Room air) Lying   09/03/23 1600 -- 80 20 195/80   Abnormal  -- -- -- Sitting   09/03/23 1530 -- 72 -- 194/81   Abnormal  116 100 % -- --   09/03/23 1520 -- 68 -- 192/79   Abnormal  113 99 % None (Room air) --     Pertinent Labs/Diagnostic Test Results:   MRI brain wo contrast   Final Result by Benjamin Cantor MD (09/05 0825)         1. Scattered multifocal infarctions predominantly in the left occipital lobe as well as in the left frontal and parietal lobes in the watershed territory, slightly increased in size and number from the prior study indicative of trace crescendo    infarctions/progressive infarctions associated with previously present infarctions in these region. 2. Trace T1 high signal in the left occipital lobe is worrisome for petechial microhemorrhage. No significant mass effect. Recommend follow-up repeat head CT in 24 hours to assess for stability. 3. Moderate, chronic microangiopathy. Workstation performed: [id number]         CTA stroke alert (head/neck)   Final Result by Dada Martin DO (09/03 1325)      Stable advanced atherosclerotic disease throughout the cervical and intracranial vasculature with multiple areas of severe stenosis most prominent within the internal carotid artery bulb bilaterally.       Advanced atherosclerotic disease of the distal left vertebral artery with occlusion proximal to the vertebrobasilar junction and severe high-grade stenosis of the proximal basilar. Additional areas of moderate to advanced atherosclerotic disease    scattered throughout the cervical and intracranial vasculature. Overall no significant change when compared with the prior examination. Findings were directly discussed with Elvia Quesada  at 1:20 p.m. Workstation performed: [id number]         CT stroke alert brain   Final Result by Dada Quintana DO (09/03 1326)      Stable subacute infarcts within the left occipital and parietal lobes. No mass effect or hemorrhagic transformation. Additional moderate periventricular white matter change consistent with chronic microangiopathy. No hemorrhage. Findings were directly discussed with Elvia Quesada  at 1:20 p.m.       Workstation performed: [id number]         XR spine lumbar 2 or 3 views injury    (Results Pending)         Results from last 7 days   Lab Units 09/04/23  0528 09/03/23  1804 09/03/23  1314   WBC Thousand/uL 7.13  --  6.70   HEMOGLOBIN g/dL 13.2  --  11.7   HEMATOCRIT % 37.4  --  34.7*   PLATELETS Thousands/uL 274 287 267   NEUTROS ABS Thousands/µL 4.30  --   --          Results from last 7 days   Lab Units 09/04/23  0528 09/03/23  1314   SODIUM mmol/L 136 130*   POTASSIUM mmol/L 4.1 4.2   CHLORIDE mmol/L 104 100   CO2 mmol/L 21 24   ANION GAP mmol/L 11 6   BUN mg/dL 51* 65*   CREATININE mg/dL 1.73* 1.93*   EGFR ml/min/1.73sq m 28 24   CALCIUM mg/dL 9.8 8.9         Results from last 7 days   Lab Units 09/05/23  1224 09/05/23  0603 09/04/23  2058 09/04/23  1727 09/04/23  1216 09/04/23  0627 09/03/23  2103 09/03/23  1759 09/03/23  1308 09/03/23  1249   POC GLUCOSE mg/dl 169* 123 99 122 284* 109 119 194* 134 137     Results from last 7 days   Lab Units 09/04/23  0528 09/03/23  1314   GLUCOSE RANDOM mg/dL 84 135       Results from last 7 days   Lab Units 09/03/23  1804 09/03/23  1504 09/03/23  1314   HS TNI 0HR ng/L  --   --  6   HS TNI 2HR ng/L  --  6  --    HSTNI D2 ng/L  --  0  --    HS TNI 4HR ng/L 7  --   --    HSTNI D4 ng/L 1  --   --          Results from last 7 days   Lab Units 09/03/23  1314   PROTIME seconds 13.2   INR  0.98   PTT seconds 34       ED Treatment:   Medication Administration from 09/03/2023 1233 to 09/03/2023 1632       Date/Time Order Dose Route Action     09/03/2023 1307 EDT iohexol (OMNIPAQUE) 350 MG/ML injection (MULTI-DOSE) 85 mL 85 mL Intravenous Given     09/03/2023 1335 EDT lidocaine (LIDODERM) 5 % patch 2 patch 2 patch Topical Medication Applied        Past Medical History:   Diagnosis Date   • Arthritis    • Chronic kidney disease    • Diabetes mellitus ([address])    • Endometriosis    • High cholesterol    • Hypertension    • Kidney disease, chronic, stage III (moderate, EGFR 30-59 ml/min) ([facility])    • Lumbar disc herniation    • Neuropathy    • Peripheral vascular disease ([facility])    • Pneumonia    • Shoulder injury     left   • Spinal stenosis    • Stroke ([address]) 2015    Memory loss     Present on Admission:  • Aortoiliac occlusive disease ([address])  • Depression, recurrent ([address])  • Type 2 diabetes mellitus, without long-term current use of insulin ([address])  • Hyperlipidemia, unspecified  • Obesity (BMI 30-39. 9)  • Stage 3 chronic kidney disease ([facility])  • Symptomatic carotid artery stenosis, left  • Hypertension      Admitting Diagnosis: Primary hypertension [I10]  CVA (cerebral vascular accident) ([address]) [I63.9]  Stroke-like symptoms [R29.90]  Age/Sex: 70 y.o. female     Admission Orders:  Scheduled Medications:  allopurinol, 100 mg, Oral, Daily  amLODIPine, 5 mg, Oral, Daily  aspirin, 81 mg, Oral, Daily  cinacalcet, 30 mg, Oral, Every Other Day  [START ON 9/9/2023] cloNIDine, 1 patch, Transdermal, Weekly  heparin (porcine), 5,000 Units, Subcutaneous, Q8H [address]  insulin glargine, 10 Units, Subcutaneous, HS  insulin lispro, 3 Units, Subcutaneous, TID With Meals  labetalol, 300 mg, Oral, Q12H [address]  lidocaine, 2 patch, Topical, Daily  losartan, 100 mg, Oral, Daily  polyethylene glycol, 17 g, Oral, Daily  pravastatin, 80 mg, Oral, Daily With Dinner  ticagrelor, 90 mg, Oral, Q12H [address]  traZODone, 50 mg, Oral, HS      Continuous IV Infusions: None     PRN Meds:  acetaminophen, 650 mg, Oral, Q6H PRN   aluminum-magnesium hydroxide-simethicone, 30 mL, Oral, Q6H PRN  ondansetron, 4 mg, Intravenous, Q6H PRN        IP CONSULT TO NEUROLOGY  IP CONSULT TO CASE MANAGEMENT  IP CONSULT TO NUTRITION SERVICES  IP CONSULT TO VASCULAR SURGERY    Network Utilization Review Department  ATTENTION: Please call with any questions or concerns to [phone] and carefully listen to the prompts so that you are directed to the right person. All voicemails are confidential.  [place] [place] all requests for admission clinical reviews, approved or denied determinations and any other requests to dedicated fax number below belonging to the campus where the patient is receiving treatment.  List of dedicated fax numbers for the Facilities:  [place] DENIALS (Administrative/Medical Necessity) [phone] [address] [facility] (Maternity/NICU/Pediatrics) [phone]   [address] [place] [address] Drive [phone]   [facility] [address] [facility] [phone]   [facility] [address] [facility] [id number] [place] [address] [phone] [address] [facility] 1300 [facility] [facility] [phone]

## 2023-09-05 NOTE — CONSULTS
Consultation - Vascular Surgery   Brijesh Mckeon 70 y.o. female MRN: [id number]  Unit/Bed#: [facility] 710-01 Encounter: [id number]      Assessment/ Plan:    B/L Carotid stenosis-- L symptomatic  L CVA (occipital, frontal, parietal lobes in watershed territory)  --plan L TCAR this admission-- Thurs, 9/7 w/ Dr. Clementina Simental  --[facility] mod risk  --cont DAPT (ASA, Brilinta)  --cont statin      Obesity- BMI 33.11  DM- uncontrolled w/ hyperglycemia (A1c 8.5)  --insulin  --Endo consult  HTN  --Norvasc  --Catapres  --Labetalol  --Cozaar- will need to be held preop for RICARDO risk reduction  Depression  --Desyrel          *d/w Dr. Jairo Vera  _______________________________________________________________  Physician Requesting Consult: Melissa Nguyen DO    Additional consultants: Neurology    Reason for Consult / Principal Problem:     HPI: Brijesh Mckeon is a 70y.o. year old female with history of remote tobacco abuse, hypertension, hyperlipidemia, CKD 3, depression, obesity, and known bilateral carotid stenosis who suffered a left occipital/left MCA territory CVA July/ August 2023. She has been maintained on aspirin and Brilinta. She was planned for elective left TCAR however due to uncontrolled diabetes with an A1c of 8.5, this was canceled for improved glycemic control and attempt to decrease infection risk. Kaye Wheeler has been convalescing in a good [employer]. Today, she presented due to concern for right sided weakness while working with physical therapy. There was some concern for orthostatic blood pressure. Stroke alert was called. Imaging was performed to include the following:  · 9/3 CT brain- stable suba infarcts L occipital & parietal lobes. Mod periventriculat white matter change c/w chr microangiopathy. · 9/3 CTA- stable adv'd atheroscl dz w/ sev stenosis ICA b/l. Adv'd atheroscl dz distal L vert w/ occl prox to VB jxn & sev stenosis of prox basilar.    · 9/5 MRI- scattered multifocal infarcts in L occipital, L frontal, L parietal lobes in watershed territory indicative of trace crescendo infarcts/ progressive infarcts. Trace T1 high signal in L occipital lobe worrisome for petechial micohemorrhage. Mod, chr microangiopath  Based on history, physical exam, and imaging, consultation has been placed to vascular surgery for evaluation of symptomatic left carotid stenosis. In chart review, it is noted that Sarina Alston presented with hemianopsia, expressive aphasia and right-sided weakness with some improvement in her symptoms shortly after arrival.  From a neurology standpoint, there was potential reactivation of prior stroke symptoms in the setting of orthostatics that now improved when supine and hypertensive. Sarina Alston has expressive aphasia and is therefore somewhat difficult to obtain history. She does seem to affirm word finding and speech difficulties but seems to understand conversation consistent with appropriate reception. She does note visual change in the right lateral visual field consistent with hemianopsia. On my evaluation, she is able to grossly move all extremities equally.     Historical Information   Past Medical History:   Diagnosis Date   • Arthritis    • Chronic kidney disease    • Diabetes mellitus ([address])    • Endometriosis    • High cholesterol    • Hypertension    • Kidney disease, chronic, stage III (moderate, EGFR 30-59 ml/min) ([facility])    • Lumbar disc herniation    • Neuropathy    • Peripheral vascular disease ([facility])    • Pneumonia    • Shoulder injury     left   • Spinal stenosis    • Stroke ([address]) 2015    Memory loss     Past Surgical History:   Procedure Laterality Date   • CARDIAC CATHETERIZATION     • COLONOSCOPY     • FL RETROGRADE PYELOGRAM  4/6/2020   • FRACTURE SURGERY Right     ankle   • OVARIAN CYST SURGERY     • KS CYSTO BLADDER W/URETERAL CATHETERIZATION Bilateral 4/6/2020    Procedure: CYSTOSCOPY WITH RETROGRADE PYELOGRAM;  Surgeon: Topher Morelos MD; [facility];  Service: Urology   • GA CYSTO W/INSERT URETERAL STENT Right 4/6/2020    Procedure: INSERTION STENT URETERAL;  Surgeon: Hoda Zhu MD;  [facility];  Service: Urology   • GA CYSTO W/REMOVAL OF TUMORS SMALL N/A 4/6/2020    Procedure: TRANSURETHRAL RESECTION OF BLADDER TUMOR (TURBT); Surgeon: Hoda Zhu MD;  [facility];  Service: Urology   • ROTATOR CUFF REPAIR Left    • TONSILLECTOMY       Social History   Social History     Substance and Sexual Activity   Alcohol Use Never     Social History     Substance and Sexual Activity   Drug Use Never     Social History     Tobacco Use   Smoking Status Former   • Packs/day: 2.00   • Years: 40.00   • Total pack years: 80.00   • Types: Cigarettes   • Start date: 1966   • Quit date: 7/27/2020   • Years since quitting: 3.1   Smokeless Tobacco Never     Family History:   Family History   Problem Relation Age of Onset   • Hypertension Mother    • Heart disease Mother         Valvular   • Hyperlipidemia Mother    • Hypertension Father    • Heart defect Father         Cardiomegaly   • Stroke Sister         Cerebrovascular Accident   • Arthritis Brother    • Other Brother         Back Disorder   }    Meds/Allergies   Home meds:   Prior to Admission medications    Medication Sig Start Date End Date Taking?  Authorizing Provider   acetaminophen (TYLENOL) 500 mg tablet Take 650 mg by mouth every 6 (six) hours as needed for mild pain   Yes Historical Provider, MD   allopurinol (ZYLOPRIM) 100 mg tablet TAKE 1 TABLET BY MOUTH EVERY DAY 1/23/23  Yes Mallorie Richards MD   amLODIPine (NORVASC) 10 mg tablet Take 1 tablet (10 mg total) by mouth daily 5/15/23  Yes Maile Vilchis MD   aspirin 81 mg chewable tablet Chew 81 mg daily   Yes Historical Provider, MD   cinacalcet (SENSIPAR) 30 mg tablet Take 1 tablet (30 mg total) by mouth every other day 8/7/23 5/3/24 Yes Mallorie Richards MD   cloNIDine (CATAPRES-TTS-3) 0.3 mg/24 hr PLACE 1 PATCH (0.3 MG TOTAL) ON THE SKIN ONCE A WEEK 5/1/23  Yes Jennifer Zaidi MD   Heparin Sodium, Porcine, (heparin, porcine,) 5,000 units/mL Inject 5,000 Units under the skin every 8 (eight) hours   Yes Historical Provider, MD   insulin lispro (HumaLOG) 100 units/mL injection Inject under the skin   Yes Historical Provider, MD   lidocaine (LIDODERM) 5 % Apply 1 patch topically daily Remove & Discard patch within 12 hours or as directed by MD   Yes Historical Provider, MD   pravastatin (PRAVACHOL) 80 mg tablet Take 1 tablet (80 mg total) by mouth daily with dinner 8/19/23  Yes Kyra Quesada MD   ticagrelor (BRILINTA) 90 MG Take 1 tablet (90 mg total) by mouth every 12 (twelve) hours 8/19/23 9/18/23 Yes Kyra Quesada MD   traZODone (DESYREL) 50 mg tablet Take 1 tablet (50 mg total) by mouth daily at bedtime 8/19/23  Yes Kyra Quesada MD   glipiZIDE (GLUCOTROL) 5 mg tablet Take 1 tablet (5 mg total) by mouth daily with breakfast 8/9/23 9/5/23 Yes Jennifer Zaidi MD   glipiZIDE (GLUCOTROL) 5 mg tablet TAKE 1 TABLET BY MOUTH EVERY DAY WITH BREAKFAST 9/5/23   Jennifer Zaidi MD   Glucagon 1 MG/0.2ML SOAJ Inject 1 mg under the skin if needed (low blood sugar)    Historical Provider, MD   labetalol (NORMODYNE) 300 mg tablet Take 1 tablet (300 mg total) by mouth 2 (two) times a day  Patient taking differently: Take 400 mg by mouth 2 (two) times a day 5/15/23   Jennifer Zaidi MD   losartan (COZAAR) 100 MG tablet Take 1 tablet (100 mg total) by mouth daily 7/17/23   Jennifer Zaidi MD     Scheduled Meds:  Current Facility-Administered Medications   Medication Dose Route Frequency Provider Last Rate   • acetaminophen  650 mg Oral Q6H PRN Fabio Powell PA-C     • allopurinol  100 mg Oral Daily Geroldkendra Kc MD     • aluminum-magnesium hydroxide-simethicone  30 mL Oral Q6H PRN Raymundo Kc MD     • amLODIPine  5 mg Oral Daily Nba Justin DO     • aspirin  81 mg Oral Daily Lillian Laws DO     • cinacalcet  30 mg Oral Every Other Day Paolo Silva MD     • [START ON 9/9/2023] cloNIDine  1 patch Transdermal Weekly Paolo Silva MD     • escitalopram  10 mg Oral Daily Lillian Laws DO     • heparin (porcine)  5,000 Units Subcutaneous Q8H [address] Paolo Silva MD     • insulin glargine  10 Units Subcutaneous HS Luisito Field DO     • insulin lispro  3 Units Subcutaneous TID With Meals Paolo Silva MD     • labetalol  300 mg Oral Q12H [address] Luisito Field DO     • lidocaine  2 patch Topical Daily Lillian Laws DO     • losartan  100 mg Oral Daily Lillian Laws DO     • ondansetron  4 mg Intravenous Q6H PRN Paolo Silva MD     • polyethylene glycol  17 g Oral Daily Paolo Silva MD     • pravastatin  80 mg Oral Daily With Rosamaria Dong DO     • ticagrelor  90 mg Oral Q12H [address] Lillian Laws DO     • traZODone  50 mg Oral HS Paolo Silva MD       Continuous Infusions:   PRN Meds:  •  acetaminophen  •  aluminum-magnesium hydroxide-simethicone  •  ondansetron    ALLERGIES:   Allergies   Allergen Reactions   • Fenofibrate Other (See Comments)      blood in urine  hx  Kidney Failure   • Colesevelam Other (See Comments)      leg pains   • Colestipol Itching and Other (See Comments)      Swelling lower legs   • Ezetimibe GI Intolerance   • Statins Myalgia       Review of Systems:  General: positive for  -as noted in HPI  Cardiovascular: no chest pain or dyspnea on exertion  Respiratory: no cough, shortness of breath, or wheezing  Gastrointestinal: no abdominal pain, change in bowel habits, or black or bloody stools  Genitourinary: no dysuria, trouble voiding, or hematuria  Musculoskeletal: positive for - back pain  Neurological: positive for - as noted in HPI  Hematological and Lymphatic: negative  Dermatological : negative  Psychological: negative  Ophthalmic: positive for - as noted in HPI  ENT: negative      Objective Vitals:  Blood pressure (!) 172/81, pulse 71, temperature 98 °F (36.7 °C), resp. rate 18, height 4' 10" (1.473 m), weight 71.9 kg (158 lb 6.4 oz), SpO2 98 %. Body mass index is 33.11 kg/m². I/Os:  I/O       09/03 0701 09/04 0700 09/04 0701  09/05 0700 09/05 0701  09/06 0700           Unmeasured Urine Occurrence 1 x 1 x           Invasive Lines/Tubes:  Invasive Devices     Peripheral Intravenous Line  Duration           Peripheral IV 09/04/23 Distal;Dorsal (posterior); Right Forearm 1 day                Physical Exam  General appearance: alert, appears stated age and cooperative. Expressive aphasia  Head: Normocephalic, without obvious abnormality, atraumatic  Eyes: conjunctivae/corneas clear. PERRL, EOM's intact. Throat: lips, mucosa, and tongue normal; teeth and gums normal  Neck: no adenopathy, no JVD and supple, symmetrical, trachea midline. L carotid bruit  Back: symmetric, no curvature. ROM normal. No CVA tenderness. Lungs: clear to auscultation bilaterally  Chest wall: no tenderness  Heart[de-identified] regular rate and rhythm, S1, S2 normal. Soft stolic murmur (Mild MR, Tr TR by echo)  Abdomen: soft, non-tender; bowel sounds normal; no masses,  no organomegaly  Genitalia: deferred  Rectal: deferred  Extremities: extremities normal, atraumatic, no cyanosis or edema  Skin: Skin color, texture, turgor normal. No rashes or lesions  Neurologic: Grossly normal--defer to complete neuro service assessment. / resistive strength equal. Facial symmetry. Tongue midline.        Pulse exam:  Radial: Right: 2+               Left: 2+  Femoral: Right: 1+                   Left: 1+   --pt sitting in bedside chair. + Pannus    DP: Right: 1+          Left: 1+  PT: Right: 1+         Left: 1+    Lab Results and Cultures:   COVID:   Last COVID19 Screening Values     None         CBC:   Results from last 7 days   Lab Units 09/04/23  0528 09/03/23  1804 09/03/23  1314   WBC Thousand/uL 7.13  --  6.70   HEMOGLOBIN g/dL 13.2  -- 11. 7   HEMATOCRIT % 37.4  --  34.7*   PLATELETS Thousands/uL 274 287 267     BMP/CMP:  Results from last 7 days   Lab Units 09/04/23  0528 09/03/23  1314   POTASSIUM mmol/L 4.1 4.2   CHLORIDE mmol/L 104 100   CO2 mmol/L 21 24   BUN mg/dL 51* 65*   CREATININE mg/dL 1.73* 1.93*   CALCIUM mg/dL 9.8 8.9     Coags:   Results from last 7 days   Lab Units 09/03/23  1314   INR  0.98   PTT seconds 34     Lipid panel:   Results from last 7 days   Lab Units 09/03/23  1314   TRIGLYCERIDES mg/dL 164*   HDL mg/dL 52     HgbA1c:   Lab Results   Component Value Date    HGBA1C 8.5 (H) 08/16/2023    HGBA1C 9.1 (H) 07/27/2023    HGBA1C 9.2 (H) 07/27/2023    HGBA1C 10.3 (H) 06/14/2023    HGBA1C 10.1 (A) 01/30/2023       Urinalysis:   Lab Results   Component Value Date    COLORU Light Yellow 08/22/2023    CLARITYU Turbid 08/22/2023    SPECGRAV 1.012 08/22/2023    PHUR 5.5 08/22/2023    LEUKOCYTESUR Large (A) 08/22/2023    NITRITE Negative 08/22/2023    GLUCOSEU Negative 08/22/2023    KETONESU Negative 08/22/2023    BILIRUBINUR Negative 08/22/2023    BLOODU Negative 08/22/2023   ,   Urine Culture:   Lab Results   Component Value Date    URINECX >100,000 cfu/ml Escherichia coli (A) 08/22/2023     Imaging Studies:    7/28 Carotid duplex-   -R 70%+. Antegr vet. (-)SCL  -L: 70%+. Antegr vert. (-) SCL  8/18 Echo- (-) wma. Mild MR. Tr TR. Sm peric eff ant to heart. 9/3 CT brain- stable suba infarcts L occipital & parietal lobes. Mod periventriculat white matter change c/w chr microangiopathy. 9/3 CTA- stable adv'd atheroscl dz w/ sev stenosis ICA b/l. Adv'd atheroscl dz distal L vert w/ occl prox to VB jxn & sev stenosis of prox basilar. 9/5 MRI- scattered multifocal infarcts in L occipital, L frontal, L parietal lobes in watershed territory indicative of trace crescendo infarcts/ progressive infarcts. Trace T1 high signal in L occipital lobe worrisome for petechial micohemorrhage.  Mod, chr microangiopathy    EKG, Pathology, and Other Studies:    VTE Prophylaxis: Heparin- SQ     Code Status: Level 3 - DNAR and DNI  Advance Directive and Living Will:      Power of :    POLST:          iWlver Yung PA-C  9/5/2023

## 2023-09-05 NOTE — CASE MANAGEMENT
Case Management Assessment & Discharge Planning Note    Patient name Keyona Segura  [facility] [id number]  : 1945 Date 2023       Current Admission Date: 9/3/2023  Current Admission Diagnosis:Acute CVA (cerebrovascular accident) [facility])   Patient Active Problem List    Diagnosis Date Noted   • Primary hypertension 2023   • Acute CVA (cerebrovascular accident) ([address]) 2023   • Acute cystitis 2023   • Aphasia as late effect of cerebrovascular accident (CVA) 2023   • Dysarthria 08/15/2023   • Stage 3b chronic kidney disease ([address])    • Hypertensive urgency 2023   • Aortoiliac occlusive disease ([address]) 10/11/2022   • History of gastrointestinal stromal tumor (GIST) 2022   • Secondary hyperparathyroidism of renal origin ([address]) 2022   • 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   • Type 2 diabetes mellitus, without long-term current use of insulin ([address]) 2021   • Type 2 diabetes mellitus with diabetic peripheral angiopathy without gangrene ([address]) 2021   • Embolism and 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 ([address]) 2021   • Depression, recurrent ([address]) 2021   • Obesity, morbid ([address]) 2021   • Stage 4 chronic kidney disease ([address]) 2020   • Hypertensive kidney disease with stage 4 chronic kidney disease ([address]) 2020   • Cervical radiculopathy 2020   • Malignant neoplasm of overlapping sites of bladder ([address]) 2020   • Stenosis of left anterior descending artery Mid LAD 50-60% stenosis 2020   • 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 01/14/2020   • Mesenteric artery stenosis (HCC) 01/14/2020   • Positive cardiac stress test  small, mildly severe, partially reversible myocardial perfusion defect of anterior and inferior wall  11/12/2019   • Abnormal CT of the chest suspicious for an infectious or inflammatory bronchiolitis.  11/07/2019   • Celiac artery stenosis (HCC) >70% stenosis in the celiac trunk 11/05/2019   • Median arcuate ligament syndrome ([address]) 11/05/2019   • Atherosclerosis of renal artery ([address]) 07/01/2019   • Aortoiliac stenosis, left ([address]) 02/25/2019   • Spondylosis of lumbar region without myelopathy or radiculopathy 01/29/2019   • Lumbosacral spondylosis without myelopathy 01/29/2019   • Statin intolerance 01/22/2019   • Lumbar disc herniation 01/22/2019   • Herniated lumbar intervertebral disc 01/22/2019   • Chronic GERD 12/04/2017   • Stage 3 chronic kidney disease ([address]) 12/04/2017   • Claudication in peripheral vascular disease ([address]) 12/04/2017   • Gout 12/04/2017   • Hx-TIA (transient ischemic attack) 12/04/2017   • Hyperlipidemia associated with type 2 diabetes mellitus ([address]) 12/04/2017   • Hypertension associated with diabetes ([address]) 12/04/2017   • Osteoarthritis 12/04/2017   • Obesity (BMI 30-39.9) 12/04/2017   • Right renal artery stenosis ([address]) 12/04/2017   • 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   • Hyperlipidemia, unspecified 12/04/2017   • Vitamin D deficiency 09/08/2016   • Basilar artery stenosis 06/22/2016   • Type 2 diabetes mellitus with diabetic nephropathy ([address]) 12/19/2015   • Hypercholesteremia 12/19/2015   • Hypertension 12/19/2015   • Polyneuropathy 12/19/2015   • CVA (cerebral vascular accident) ([address]) 11/09/2015      LOS (days): 2  Geometric Mean LOS (GMLOS) (days):   Days to GMLOS:     OBJECTIVE:  PATIENT READMITTED TO HOSPITAL  Risk of Unplanned Readmission Score: 26.22         Current admission status: Inpatient Preferred Pharmacy:   [facility] [id number])  [address] [address]  Phone: [phone] Fax: [phone]    [id number] [address] [facility] [address]  Phone: [phone] Fax: [phone]    Primary Care Provider: David Ron MD    Primary Insurance: [facility]  Secondary Insurance:     ASSESSMENT:  [address], [address] Representative - Son   Primary Phone: [phone] (Mobile)                  Readmission Root Cause  30 Day Readmission: Yes  Who directed you to return to the hospital?: Other (comment) (PT admitted from [facility])  Did you understand whom to contact if you had questions or problems?: Yes  Did you get your prescriptions before you left the hospital?: No  Were you able to get your prescriptions filled when you left the hospital?: Yes  Did you take your medications as prescribed?: Yes  During previous admission, was a post-acute recommendation made?: Yes  What post-acute resources were offered?: STR    Patient Information  Admitted from[de-identified] Facility  Mental Status: Alert  During Assessment patient was accompanied by: Not accompanied during assessment  Assessment information provided by[de-identified] Patient  Primary Caregiver: Self  Support Systems: Self, Son, Family members  [place] of Residence: [address] [place] [address] do you live in?: [address] [place] [address] entry access options.  Select all that apply.: No steps to enter home  Type of Current Residence: [place]  In the last 12 months, was there a time when you were not able to pay the mortgage or rent on time?: No  In the last 12 months, how many places have you lived?: 1  Homeless/housing insecurity resource given?: N/A  Is patient a ?: No    Activities of Daily Living Prior to Admission  Functional Status: Independent  Completes ADLs independently?: Yes  Ambulates independently?: Yes  Does patient use assisted devices?: Yes  Assisted Devices (DME) used: Stacie Gilbert  Does patient have a history of Outpatient Therapy (PT/OT)?: No  Does the patient have a history of Short-Term Rehab?: Yes ([facility])         Patient Information Continued  Income Source: Pension/USP  Does patient have prescription coverage?: Yes  Within the past 12 months, you worried that your food would run out before you got the money to buy more.: Never true  Within the past 12 months, the food you bought just didn't last and you didn't have money to get more.: Never true  Food insecurity resource given?: N/A  Does patient receive dialysis treatments?: No  Does patient have a history of substance abuse?: No  Does patient have a history of Mental Health Diagnosis?: No         Means of Transportation  Means of Transport to Appts[de-identified] Family transport  In the past 12 months, has lack of transportation kept you from medical appointments or from getting medications?: No  In the past 12 months, has lack of transportation kept you from meetings, work, or from getting things needed for daily living?: No        DISCHARGE DETAILS:    Discharge planning discussed with[de-identified] Patient     Comments - Freedom of Choice: FOC discussed. Pt would like to return to [facility] when medically cleared       Other Referral/Resources/Interventions Provided:  Interventions: Short Term Rehab  Referral Comments: Recs for STR. Pt admitted from [facility], she would like to return when medically cleared. Referral sent in Aidin for return to rehab when medically cleared. CM reviewed d/c planning process including the following: identifying help at home, patient preference for d/c planning needs, [facility] L[facility], availability of treatment team to discuss questions or concerns patient and/or family may have regarding understanding medications and recognizing signs and symptoms once discharged.   CM also encouraged patient to follow up with all recommended appointments after discharge. Patient advised of importance for patient and family to participate in managing patient’s medical well being. Information:  Info obtained from pt. This CM met with pt at bedside. Introduced myself and explained my role. Pt stated she was at [facility] prior to admit and would like to return when medically stable. Referral sent in Aidin.

## 2023-09-05 NOTE — PROGRESS NOTES
NEUROLOGY RESIDENCY PROGRESS NOTE     Name: Carolina Moore   Age & Sex: 70 y.o. female   MRN: [id number]  Unit/Bed#: [facility] 710-01   Encounter: [id number]    Recommendations for outpatient neurological follow up have yet to be determined. Pending for discharge: Vascular surgery intervention - likely end of this week    ASSESSMENT & PLAN     * Acute CVA (cerebrovascular accident) [facility])  2 [facility] R-handed female with history of HTN, HLD, CKD, recent acute CVA of L occipital lobe and L MCA territory with residual expressive aphasia and R hemianopia (8/15/2023), advanced atherosclerotic disease affecting multiple neurovascular structures who presented as a stroke alert on 9/3/2023 for acute RLE and RUE weakness. This occurred while she was working with PT at [employer]. Denied headache, dizziness, numbness/tingling. Initial NIHSS 8 - inability to accurately state month and age, R hemianopia, RLE weakness, RLE decreased sensation, moderate expressive aphasia. Patient has been on ASA/Brilinta since 7/29/2023 for CVA. No TNK administered given complete resolution of acute symptoms. CTH showed stable subacute infarcts within the L occipital and parietal lobes. CTA showed stable advanced atherosclerotic disease throughout cervical and intracranial vasculature with multiple areas of severe stenosis most prominent within the ICA bulb bilaterally; no significant change compared with prior examination. Patient was recently scheduled for TCAR with vascular surgery but this was canceled due to elevated A1c level. Admitted on stroke pathway. MRI brain concerning for acute stroke, etiology most likely associated with severe ICA stenosis. Vascular surgery team consulted for intervention. Prior work-up:  -Echo, 8/18/23: EF 62%. G1DD, LA normal size, no major valvular dysfunction. -MRI brain wo contrast, 9/5/23: 1.  Scattered multifocal infarctions predominantly in the left occipital lobe as well as in the left frontal and parietal lobes in the watershed territory, slightly increased in size and number from the prior study indicative of trace crescendo   infarctions/progressive infarctions associated with previously present infarctions in these region. 2. Trace T1 high signal in the left occipital lobe is worrisome for petechial microhemorrhage. No significant mass effect. Recommend follow-up repeat head CT in 24 hours to assess for stability. 3. Moderate, chronic microangiopathy. Lab Results   Component Value Date    HGBA1C 8.5 (H) 08/16/2023    LDLCALC 79 08/16/2023       Impression: MRI brain demonstrated multifocal acute infarcts in the left occipital, left frontal, left parietal regions and watershed territories. Etiology suspected to be secondary to hypoperfusion in the setting of severe ICA stenosis. Plan, Stroke Pathway:  · Discussed with attending Dr. Britt Marte  · Continue home ASA/Brilinta  · Continue home Pravastatin 80mg daily with dinner  · SBP goal 120-160  · Vascular surgery consulted for urgent intervention of ICA stenosis. · Given concerns for poststroke depression/anxiety, started Lexapro 10 mg daily. · Normoglycemia, normothermia. · Monitor on telemetry  · Bedside swallow eval  · Stat CT Head for change in neuro status. · DVT ppx, SCDs  • PT/OT/PM&R evaluations   • Medical management as per primary team appreciated. SUBJECTIVE     Patient was seen and examined. No acute events overnight. Patient reports she currently feels at baseline with no acute concerns at this time. After receiving news about her MRI findings, she was understandably very upset. All questions and concerns answered to the best of our ability. Son was also updated as per patient's request.    Review of Systems   Constitutional: Negative for appetite change, chills, diaphoresis, fatigue and fever. HENT: Negative for trouble swallowing. Eyes: Negative for visual disturbance. Neurological: Positive for speech difficulty (chronic, no acute worsening). Negative for dizziness, weakness, light-headedness and headaches. Psychiatric/Behavioral: Positive for dysphoric mood. The patient is nervous/anxious. All other systems reviewed and are negative. OBJECTIVE     Patient ID: Bozena Cristina is a 70 y.o. female. Vitals:    23 0741 23 0842 23 1030 23 1551   BP: (!) 175/76 (!) 175/76 169/79 (!) 172/81   Pulse: 94  78 71   Resp:       Temp: 97.9 °F (36.6 °C)   98 °F (36.7 °C)   TempSrc:       SpO2: 96%  97% 98%   Weight:       Height:          Temperature:   Temp (24hrs), Av °F (36.7 °C), Min:97.9 °F (36.6 °C), Max:98 °F (36.7 °C)    Temperature: 98 °F (36.7 °C)      Physical Exam  Vitals and nursing note reviewed. Constitutional:       General: She is not in acute distress. Appearance: She is not ill-appearing, toxic-appearing or diaphoretic. HENT:      Head: Normocephalic and atraumatic. Right Ear: External ear normal.      Left Ear: External ear normal.      Nose: Nose normal.      Mouth/Throat:      Mouth: Mucous membranes are moist.   Eyes:      General:         Right eye: No discharge. Left eye: No discharge. Extraocular Movements: Extraocular movements intact and EOM normal.      Conjunctiva/sclera: Conjunctivae normal.      Pupils: Pupils are equal, round, and reactive to light. Cardiovascular:      Rate and Rhythm: Normal rate. Pulmonary:      Effort: Pulmonary effort is normal. No respiratory distress. Musculoskeletal:         General: Normal range of motion. Cervical back: Normal range of motion. Right lower leg: No edema. Left lower leg: No edema. Skin:     General: Skin is warm and dry. Neurological:      Mental Status: She is alert and oriented to person, place, and time. Mental status is at baseline.       Motor: Motor strength is normal.     Deep Tendon Reflexes:      Reflex Scores: Tricep reflexes are 1+ on the right side and 1+ on the left side. Bicep reflexes are 1+ on the right side and 1+ on the left side. Brachioradialis reflexes are 1+ on the right side and 1+ on the left side. Patellar reflexes are 1+ on the right side and 1+ on the left side. Achilles reflexes are 1+ on the right side and 1+ on the left side. Psychiatric:      Comments: Cheerful prior to learning MRI results. Understandably very tearful and upset after learning MRI results. Neurologic Exam     Mental Status   Oriented to person, place, and time. Attention: normal. Concentration: normal.   Speech: (Expressive aphasia. No dysarthria appreciated.)  Level of consciousness: alert  Able to name object (sometimes able to accurately name; sometimes inaccurate 2/2 expressive aphasia). Abnormal comprehension (mild receptive aphasia). Cranial Nerves     CN II   Right visual field deficit: upper temporal and lower temporal quadrant(s)    CN III, IV, VI   Pupils are equal, round, and reactive to light. Extraocular motions are normal.     CN V   Facial sensation intact. CN VII   Facial expression full, symmetric. CN VIII   CN VIII normal.     CN IX, X   CN IX normal.   CN X normal.     CN XI   CN XI normal.     CN XII   CN XII normal.     Motor Exam   Muscle bulk: normal  Overall muscle tone: normal    Strength   Strength 5/5 throughout. Sensory Exam   Light touch normal.     Gait, Coordination, and Reflexes     Reflexes   Right brachioradialis: 1+  Left brachioradialis: 1+  Right biceps: 1+  Left biceps: 1+  Right triceps: 1+  Left triceps: 1+  Right patellar: 1+  Left patellar: 1+  Right achilles: 1+  Left achilles: 1+  L FNF normal  Unable to do R FNF secondary to either receptive aphasia (confusion) vs R hemineglect. LABORATORY DATA     Labs: I have personally reviewed pertinent reports.     Results from last 7 days   Lab Units 09/04/23  0528 09/03/23  4853 09/03/23  1314   WBC Thousand/uL 7.13  --  6.70   HEMOGLOBIN g/dL 13.2  --  11.7   HEMATOCRIT % 37.4  --  34.7*   PLATELETS Thousands/uL 274 287 267   NEUTROS PCT % 60  --   --    MONOS PCT % 12  --   --    EOS PCT % 5  --   --       Results from last 7 days   Lab Units 09/04/23  0528 09/03/23  1314   SODIUM mmol/L 136 130*   POTASSIUM mmol/L 4.1 4.2   CHLORIDE mmol/L 104 100   CO2 mmol/L 21 24   BUN mg/dL 51* 65*   CREATININE mg/dL 1.73* 1.93*   CALCIUM mg/dL 9.8 8.9              Results from last 7 days   Lab Units 09/03/23  1314   INR  0.98   PTT seconds 34               IMAGING & DIAGNOSTIC TESTING     Radiology Results: I have personally reviewed pertinent films in PACS    MRI brain wo contrast   Final Result by Ana M Duque MD (09/05 0825)         1. Scattered multifocal infarctions predominantly in the left occipital lobe as well as in the left frontal and parietal lobes in the watershed territory, slightly increased in size and number from the prior study indicative of trace crescendo    infarctions/progressive infarctions associated with previously present infarctions in these region. 2. Trace T1 high signal in the left occipital lobe is worrisome for petechial microhemorrhage. No significant mass effect. Recommend follow-up repeat head CT in 24 hours to assess for stability. 3. Moderate, chronic microangiopathy.       Workstation performed: [id number]         XR spine lumbar 2 or 3 views injury   Final Result by Ly Young MD (09/05 2925)      Transitional lumbosacral anatomy   Degenerative disease seen at the L2-3   Degenerative disease seen at L4-5   Sacralization of the L5 vertebra   No acute compression collapse of the vetebra      Workstation performed: [id number]         CTA stroke alert (head/neck)   Final Result by Dada Rodriguez DO (09/03 1325)      Stable advanced atherosclerotic disease throughout the cervical and intracranial vasculature with multiple areas of severe stenosis most prominent within the internal carotid artery bulb bilaterally. Advanced atherosclerotic disease of the distal left vertebral artery with occlusion proximal to the vertebrobasilar junction and severe high-grade stenosis of the proximal basilar. Additional areas of moderate to advanced atherosclerotic disease    scattered throughout the cervical and intracranial vasculature. Overall no significant change when compared with the prior examination. Findings were directly discussed with Elvia Quesada  at 1:20 p.m. Workstation performed: [id number]         CT stroke alert brain   Final Result by Dada Penn DO (09/03 1326)      Stable subacute infarcts within the left occipital and parietal lobes. No mass effect or hemorrhagic transformation. Additional moderate periventricular white matter change consistent with chronic microangiopathy. No hemorrhage. Findings were directly discussed with Elvia Quesada  at 1:20 p.m. Workstation performed: [id number]             Other Diagnostic Testing: I have personally reviewed pertinent reports.       ACTIVE MEDICATIONS     Current Facility-Administered Medications   Medication Dose Route Frequency   • acetaminophen (TYLENOL) tablet 650 mg  650 mg Oral Q6H PRN   • allopurinol (ZYLOPRIM) tablet 100 mg  100 mg Oral Daily   • aluminum-magnesium hydroxide-simethicone (MAALOX) oral suspension 30 mL  30 mL Oral Q6H PRN   • amLODIPine (NORVASC) tablet 5 mg  5 mg Oral Daily   • aspirin chewable tablet 81 mg  81 mg Oral Daily   • cinacalcet (SENSIPAR) tablet 30 mg  30 mg Oral Every Other Day   • [START ON 9/9/2023] cloNIDine (CATAPRES-TTS-3) 0.3 mg/24 hr TD weekly patch  1 patch Transdermal Weekly   • escitalopram (LEXAPRO) tablet 10 mg  10 mg Oral Daily   • heparin (porcine) subcutaneous injection 5,000 Units  5,000 Units Subcutaneous Q8H [address]   • insulin glargine (LANTUS) subcutaneous injection 10 Units 0.1 mL  10 Units Subcutaneous HS   • insulin lispro (HumaLOG) 100 units/mL subcutaneous injection 3 Units  3 Units Subcutaneous TID With Meals   • labetalol (NORMODYNE) tablet 300 mg  300 mg Oral Q12H JAVIER   • lidocaine (LIDODERM) 5 % patch 2 patch  2 patch Topical Daily   • losartan (COZAAR) tablet 100 mg  100 mg Oral Daily   • ondansetron (ZOFRAN) injection 4 mg  4 mg Intravenous Q6H PRN   • polyethylene glycol (MIRALAX) packet 17 g  17 g Oral Daily   • pravastatin (PRAVACHOL) tablet 80 mg  80 mg Oral Daily With Dinner   • ticagrelor (BRILINTA) tablet 90 mg  90 mg Oral Q12H JAVIER   • traZODone (DESYREL) tablet 50 mg  50 mg Oral HS       Prior to Admission medications    Medication Sig Start Date End Date Taking?  Authorizing Provider   acetaminophen (TYLENOL) 500 mg tablet Take 650 mg by mouth every 6 (six) hours as needed for mild pain   Yes Historical Provider, MD   allopurinol (ZYLOPRIM) 100 mg tablet TAKE 1 TABLET BY MOUTH EVERY DAY 1/23/23  Yes Mallorie Richards MD   amLODIPine (NORVASC) 10 mg tablet Take 1 tablet (10 mg total) by mouth daily 5/15/23  Yes Maile Vilchis MD   aspirin 81 mg chewable tablet Chew 81 mg daily   Yes Historical Provider, MD   cinacalcet (SENSIPAR) 30 mg tablet Take 1 tablet (30 mg total) by mouth every other day 8/7/23 5/3/24 Yes Mallorie Richards MD   cloNIDine (CATAPRES-TTS-3) 0.3 mg/24 hr PLACE 1 PATCH (0.3 MG TOTAL) ON THE SKIN ONCE A WEEK 5/1/23  Yes Maile Vilchis MD   Heparin Sodium, Porcine, (heparin, porcine,) 5,000 units/mL Inject 5,000 Units under the skin every 8 (eight) hours   Yes Historical Provider, MD   insulin lispro (HumaLOG) 100 units/mL injection Inject under the skin   Yes Historical Provider, MD   lidocaine (LIDODERM) 5 % Apply 1 patch topically daily Remove & Discard patch within 12 hours or as directed by MD   Yes Historical Provider, MD   pravastatin (PRAVACHOL) 80 mg tablet Take 1 tablet (80 mg total) by mouth daily with dinner 8/19/23  Yes Beth Chandler Korey Gamez MD   [facility]) 90 MG Take 1 tablet (90 mg total) by mouth every 12 (twelve) hours 8/19/23 9/18/23 Yes Chan Lutz MD   traZODone (DESYREL) 50 mg tablet Take 1 tablet (50 mg total) by mouth daily at bedtime 8/19/23  Yes Chan Lutz MD   glipiZIDE (GLUCOTROL) 5 mg tablet Take 1 tablet (5 mg total) by mouth daily with breakfast 8/9/23 9/5/23 Yes David Ron MD   glipiZIDE (GLUCOTROL) 5 mg tablet TAKE 1 TABLET BY MOUTH EVERY DAY WITH BREAKFAST 9/5/23   David Ron MD   Glucagon 1 MG/0.2ML SOAJ Inject 1 mg under the skin if needed (low blood sugar)    Historical Provider, MD   labetalol (NORMODYNE) 300 mg tablet Take 1 tablet (300 mg total) by mouth 2 (two) times a day  Patient taking differently: Take 400 mg by mouth 2 (two) times a day 5/15/23   David Ron MD   losartan (COZAAR) 100 MG tablet Take 1 tablet (100 mg total) by mouth daily 7/17/23   David Ron MD         VTE Pharmacologic Prophylaxis: Heparin  VTE Mechanical Prophylaxis: sequential compression device    ======    I have discussed the patient's history, physical exam findings, assessment, and plan in detail with attending, Dr. Nena Trujillo    Thank you for allowing me to participate in the care of your patient, Brijesh Mckeon.     DO [facility], PGY-2

## 2023-09-05 NOTE — TELEPHONE ENCOUNTER
Pt's son, Enriqueta Don, called. He requested a return call directly from Dr. Jean Saunders to discuss his mother's case. Austin's phone number is [phone]. Routed to provider to make him aware.

## 2023-09-05 NOTE — ASSESSMENT & PLAN NOTE
Patient presents with acute onset right upper and lower extremity weakness while at [facility] undergoing physical therapy for recent stroke in mid-August. She was a stroke alert and was evaluated by neurology in the ED. Patient symptomatically improving at time of admission. · Per neurology, recrudescence of previous stroke likely due to orthostasis while working with physical therapy given significant carotid artery and intracranial artery disease  · However brain MRI with scattered multifocal infarctions predominantly in the left occipital lobe as well as in the left frontal and parietal lobes in the watershed territory, slightly increased in size and number from the prior study indicative of trace crescendo infarctions/progressive infarctions associated with previously present infarctions in these region.   · Per neurology suspect acute stroke secondary to symptomatic ICA stenosis and recommending vascular surgery evaluation   · Continue aspirin, Brilinta, pravastatin  · Vascular surgery consulted  · Monitor

## 2023-09-05 NOTE — PLAN OF CARE
Problem: MOBILITY - ADULT  Goal: Maintain or return to baseline ADL function  Description: INTERVENTIONS:  -  Assess patient's ability to carry out ADLs; assess patient's baseline for ADL function and identify physical deficits which impact ability to perform ADLs (bathing, care of mouth/teeth, toileting, grooming, dressing, etc.)  - Assess/evaluate cause of self-care deficits   - Assess range of motion  - Assess patient's mobility; develop plan if impaired  - Assess patient's need for assistive devices and provide as appropriate  - Encourage maximum independence but intervene and supervise when necessary  - Involve family in performance of ADLs  - Assess for home care needs following discharge   - Consider OT consult to assist with ADL evaluation and planning for discharge  - Provide patient education as appropriate  Outcome: Progressing  Goal: Maintains/Returns to pre admission functional level  Description: INTERVENTIONS:  - Perform BMAT or MOVE assessment daily.   - Set and communicate daily mobility goal to care team and patient/family/caregiver.    - Collaborate with rehabilitation services on mobility goals if consulte  - Out of bed for toileting  - Record patient progress and toleration of activity level   Outcome: Progressing     Problem: PAIN - ADULT  Goal: Verbalizes/displays adequate comfort level or baseline comfort level  Description: Interventions:  - Encourage patient to monitor pain and request assistance  - Assess pain using appropriate pain scale  - Administer analgesics based on type and severity of pain and evaluate response  - Implement non-pharmacological measures as appropriate and evaluate response  - Consider cultural and social influences on pain and pain management  - Notify physician/advanced practitioner if interventions unsuccessful or patient reports new pain  Outcome: Progressing     Problem: INFECTION - ADULT  Goal: Absence or prevention of progression during hospitalization  Description: INTERVENTIONS:  - Assess and monitor for signs and symptoms of infection  - Monitor lab/diagnostic results  - Monitor all insertion sites, i.e. indwelling lines, tubes, and drains  - Monitor endotracheal if appropriate and nasal secretions for changes in amount and color  - [place] appropriate cooling/warming therapies per order  - Administer medications as ordered  - Instruct and encourage patient and family to use good hand hygiene technique  - Identify and instruct in appropriate isolation precautions for identified infection/condition  Outcome: Progressing  Goal: Absence of fever/infection during neutropenic period  Description: INTERVENTIONS:  - Monitor WBC    Outcome: Progressing     Problem: SAFETY ADULT  Goal: Maintain or return to baseline ADL function  Description: INTERVENTIONS:  -  Assess patient's ability to carry out ADLs; assess patient's baseline for ADL function and identify physical deficits which impact ability to perform ADLs (bathing, care of mouth/teeth, toileting, grooming, dressing, etc.)  - Assess/evaluate cause of self-care deficits   - Assess range of motion  - Assess patient's mobility; develop plan if impaired  - Assess patient's need for assistive devices and provide as appropriate  - Encourage maximum independence but intervene and supervise when necessary  - Involve family in performance of ADLs  - Assess for home care needs following discharge   - Consider OT consult to assist with ADL evaluation and planning for discharge  - Provide patient education as appropriate  Outcome: Progressing  Goal: Maintains/Returns to pre admission functional level  Description: INTERVENTIONS:  - Perform BMAT or MOVE assessment daily.   - Set and communicate daily mobility goal to care team and patient/family/caregiver.    - Collaborate with rehabilitation services on mobility goals if consulted  - Ambulate patient 3 times a day    - Out of bed for toileting  - Record patient progress and toleration of activity level   Outcome: Progressing  Goal: Patient will remain free of falls  Description: INTERVENTIONS:  - Educate patient/family on patient safety including physical limitations  - Instruct patient to call for assistance with activity   - Consult OT/PT to assist with strengthening/mobility   - Keep Call bell within reach  - Keep bed low and locked with side rails adjusted as appropriate  - Keep care items and personal belongings within reach  - Initiate and maintain comfort rounds  - Make Fall Risk Sign visible to staff  - Offer Toileting every 2 Hours, in advance of need  - Initiate/Maintain bed alarm  - Obtain necessary fall risk management equipment:   - Apply yellow socks and bracelet for high fall risk patients  - Consider moving patient to room near nurses station  Outcome: Progressing     Problem: DISCHARGE PLANNING  Goal: Discharge to home or other facility with appropriate resources  Description: INTERVENTIONS:  - Identify barriers to discharge w/patient and caregiver  - Arrange for needed discharge resources and transportation as appropriate  - Identify discharge learning needs (meds, wound care, etc.)  - Arrange for interpretive services to assist at discharge as needed  - Refer to Case Management Department for coordinating discharge planning if the patient needs post-hospital services based on physician/advanced practitioner order or complex needs related to functional status, cognitive ability, or social support system  Outcome: Progressing     Problem: Knowledge Deficit  Goal: Patient/family/caregiver demonstrates understanding of disease process, treatment plan, medications, and discharge instructions  Description: Complete learning assessment and assess knowledge base.   Interventions:  - Provide teaching at level of understanding  - Provide teaching via preferred learning methods  Outcome: Progressing     Problem: Prexisting or High Potential for Compromised Skin Integrity  Goal: Skin integrity is maintained or improved  Description: INTERVENTIONS:  - Identify patients at risk for skin breakdown  - Assess and monitor skin integrity  - Assess and monitor nutrition and hydration status  - Monitor labs   - Assess for incontinence   - Turn and reposition patient  - Assist with mobility/ambulation  - Relieve pressure over bony prominences  - Avoid friction and shearing  - Provide appropriate hygiene as needed including keeping skin clean and dry  - Evaluate need for skin moisturizer/barrier cream  - Collaborate with interdisciplinary team   - Patient/family teaching  - Consider wound care consult   Outcome: Progressing     Problem: NEUROSENSORY - ADULT  Goal: Achieves stable or improved neurological status  Description: INTERVENTIONS  - Monitor and report changes in neurological status  - Monitor vital signs such as temperature, blood pressure, glucose, and any other labs ordered   - Initiate measures to prevent increased intracranial pressure  - Monitor for seizure activity and implement precautions if appropriate      Outcome: Progressing     Problem: CARDIOVASCULAR - ADULT  Goal: Maintains optimal cardiac output and hemodynamic stability  Description: INTERVENTIONS:  - Monitor I/O, vital signs and rhythm  - Monitor for S/S and trends of decreased cardiac output  - Administer and titrate ordered vasoactive medications to optimize hemodynamic stability  - Assess quality of pulses, skin color and temperature  - Assess for signs of decreased coronary artery perfusion  - Instruct patient to report change in severity of symptoms  Outcome: Progressing     Problem: Nutrition/Hydration-ADULT  Goal: Nutrient/Hydration intake appropriate for improving, restoring or maintaining nutritional needs  Description: Monitor and assess patient's nutrition/hydration status for malnutrition. Collaborate with interdisciplinary team and initiate plan and interventions as ordered.   Monitor patient's weight and dietary intake as ordered or per policy. Utilize nutrition screening tool and intervene as necessary. Determine patient's food preferences and provide high-protein, high-caloric foods as appropriate.      INTERVENTIONS:  - Monitor oral intake, urinary output, labs, and treatment plans  - Assess nutrition and hydration status and recommend course of action  - Evaluate amount of meals eaten  - Assist patient with eating if necessary   - Allow adequate time for meals  - Recommend/ encourage appropriate diets, oral nutritional supplements, and vitamin/mineral supplements  - Order, calculate, and assess calorie counts as needed  - Recommend, monitor, and adjust tube feedings and TPN/PPN based on assessed needs  - Assess need for intravenous fluids  - Provide specific nutrition/hydration education as appropriate  - Include patient/family/caregiver in decisions related to nutrition  Outcome: Progressing

## 2023-09-05 NOTE — PHYSICAL THERAPY NOTE
Physical Therapy Progress Note     09/05/23 0930   PT Last Visit   PT Visit Date 09/05/23   Note Type   Note Type Treatment   Pain Assessment   Pain Assessment Tool 0-10   Pain Score No Pain   Restrictions/Precautions   Other Precautions Cognitive; Chair Alarm; Bed Alarm; Fall Risk   Subjective   Subjective The patient is eager to continue working with therapy to regain her independence. Transfers   Sit to Stand 4  Minimal assistance   Additional items Assist x 1; Increased time required   Stand to Sit 4  Minimal assistance   Additional items Assist x 1; Increased time required   Ambulation/Elevation   Gait pattern Excessively slow; Short stride; Inconsistent kt;Retropulsion; Improper Weight shift;Decreased R stance   Gait Assistance 4  Minimal assist   Additional items Assist x 1;Verbal cues; Tactile cues   Assistive Device Rolling walker   Distance 80 feet, 50 feet. Balance   Static Sitting Good   Dynamic Sitting Fair   Static Standing Fair -   Ambulatory Poor +   Activity Tolerance   Activity Tolerance Patient tolerated treatment well;Patient limited by fatigue   Nurse Made Aware Yes. Assessment   Prognosis Good   Problem List Decreased strength;Decreased range of motion;Decreased endurance; Impaired balance;Decreased mobility; Decreased coordination;Decreased cognition; Impaired judgement;Decreased safety awareness; Impaired sensation;Obesity   Assessment The patient is demonstrating progress today with improved ambulatory distance. She did have two losses of balance, one posteriorly and one laterally to the right, that necessitated assistance to correct. She is very eager to work with therapy to regain her independence. She fatigues with distances beyond household, but she recovers with rests. Continued deficits are noted in her right hand, but once she has grasped the walker she is able to maintain it.    Barriers to Discharge Inaccessible home environment;Decreased caregiver support   Goals   Patient Goals To regain her independence. STG Expiration Date 09/18/23   PT Treatment Day 1   Plan   Treatment/Interventions Functional transfer training;LE strengthening/ROM; Therapeutic exercise; Endurance training;Patient/family training;Bed mobility;Gait training;Elevations   Progress Progressing toward goals   PT Frequency 3-5x/wk   Recommendation   PT Discharge Recommendation Post acute rehabilitation services   AM-PAC Basic Mobility Inpatient   Turning in Flat Bed Without Bedrails 3   Lying on Back to Sitting on Edge of Flat Bed Without Bedrails 2   Moving Bed to Chair 3   Standing Up From Chair Using Arms 3   Walk in Room 3   Climb 3-5 Stairs With Railing 2   Basic Mobility Inpatient Raw Score 16   Basic Mobility Standardized Score 38.32   Highest Level Of Mobility   JH-HLM Goal 5: Stand one or more mins   JH-HLM Achieved 7: Walk 25 feet or more         An AM-PAC Basic Mobility raw score less than 16 suggests the patient may benefit from discharge to post-acute rehab services.     James Yoder, PTA

## 2023-09-05 NOTE — PLAN OF CARE
Problem: PHYSICAL THERAPY ADULT  Goal: Performs mobility at highest level of function for planned discharge setting. See evaluation for individualized goals. Description: Treatment/Interventions: ADL retraining, Functional transfer training, LE strengthening/ROM, Elevations, Therapeutic exercise, Endurance training, Patient/family training, Cognitive reorientation, Equipment eval/education, Bed mobility, Gait training, Compensatory technique education, Spoke to nursing, OT, Spoke to case management, Spoke to advanced practitioner          See flowsheet documentation for full assessment, interventions and recommendations. Outcome: Progressing  Note: Prognosis: Good  Problem List: Decreased strength, Decreased range of motion, Decreased endurance, Impaired balance, Decreased mobility, Decreased coordination, Decreased cognition, Impaired judgement, Decreased safety awareness, Impaired sensation, Obesity  Assessment: The patient is demonstrating progress today with improved ambulatory distance. She did have two losses of balance, one posteriorly and one laterally to the right, that necessitated assistance to correct. She is very eager to work with therapy to regain her independence. She fatigues with distances beyond household, but she recovers with rests. Continued deficits are noted in her right hand, but once she has grasped the walker she is able to maintain it. Barriers to Discharge: Inaccessible home environment, Decreased caregiver support     PT Discharge Recommendation: Post acute rehabilitation services    See flowsheet documentation for full assessment.

## 2023-09-05 NOTE — PROGRESS NOTES
[address] [facility]  Progress Note  Name: Cynthia Kwong  MRN: [id number]  Unit/Bed#: [facility] 710-01 I Date of Admission: 9/3/2023   Date of Service: 9/5/2023 I Hospital Day: 2    Assessment/Plan   * Acute CVA (cerebrovascular accident) [facility])  Assessment & Plan  Patient presents with acute onset right upper and lower extremity weakness while at SNF undergoing physical therapy for recent stroke in mid-August. She was a stroke alert and was evaluated by neurology in the ED. Patient symptomatically improving at time of admission. · Per neurology, recrudescence of previous stroke likely due to orthostasis while working with physical therapy given significant carotid artery and intracranial artery disease  · However brain MRI with scattered multifocal infarctions predominantly in the left occipital lobe as well as in the left frontal and parietal lobes in the watershed territory, slightly increased in size and number from the prior study indicative of trace crescendo infarctions/progressive infarctions associated with previously present infarctions in these region.   · Per neurology suspect acute stroke secondary to symptomatic ICA stenosis and recommending vascular surgery evaluation   · Continue aspirin, Brilinta, pravastatin  · Vascular surgery consulted  · Monitor    Symptomatic carotid artery stenosis, left  Assessment & Plan  Patient with left carotid artery disease stroke  · CTA head and neck reveals extensive intracranial atherosclerotic disease stenosis extensive disease bilateral carotid arteries  · Continue aspirin statin Brilinta  · Vascular surgery consulted    Aphasia as late effect of cerebrovascular accident (CVA)  Assessment & Plan  · Supportive cares    Primary hypertension  Assessment & Plan  Labetalol and amlodipine  Monitor    Aortoiliac occlusive disease (HCC)  Assessment & Plan  · Continue aspirin statin  · Outpatient vascular surgery follow-up    Type 2 diabetes mellitus, without long-term current use of insulin [facility])  Assessment & Plan  Lab Results   Component Value Date    HGBA1C 8.5 (H) 08/16/2023       Recent Labs     09/04/23  1216 09/04/23  1727 09/04/23  2058 09/05/23  0603   POCGLU 284* 122 99 123       Blood Sugar Average: Last 72 hrs:  (P) [id number]     Uncontrolled  · Hold glipizide while inpatient  · Continue Lantus at bedtime + SSI   · Monitor Accu-Cheks and adjust as needed   · Avoid hypoglycemia  · Hypoglycemia protocol in place    Depression, recurrent (HCC)  Assessment & Plan  · Continue trazodone    Hyperlipidemia, unspecified  Assessment & Plan  History of myalgias to statin  · Patient on pravastatin and tolerating, continue same     Obesity (BMI 30-39. 9)  Assessment & Plan  · Therapeutic diet and lifestyle modification encouraged    Hypertension  Assessment & Plan  Initially recommended permissive hypertension per neurology   · Home amlodipine and losartan currently on hold   · Continued on home dose clonidine   · Monitor routinely     Stage 3 chronic kidney disease [facility])  Assessment & Plan  Lab Results   Component Value Date    EGFR 28 09/04/2023    EGFR 24 09/03/2023    EGFR 29 08/23/2023    CREATININE 1.73 (H) 09/04/2023    CREATININE 1.93 (H) 09/03/2023    CREATININE 1.65 (H) 08/23/2023     · Kidney function stable within baseline   · Avoid nephrotoxins and hypotension  · Monitor BMP intermittently            VTE Pharmacologic Prophylaxis: VTE Score: 11 High Risk (Score >/= 5) - Pharmacological DVT Prophylaxis Ordered: heparin. Sequential Compression Devices Ordered. Patient Centered Rounds: I performed bedside rounds with nursing staff today. Discussions with Specialists or Other Care Team Provider: Neurology    Education and Discussions with Family / Patient: Patient.      Total Time Spent on Date of Encounter in care of patient: 45 minutes This time was spent on one or more of the following: performing physical exam; counseling and coordination of care; obtaining or reviewing history; documenting in the medical record; reviewing/ordering tests, medications or procedures; communicating with other healthcare professionals and discussing with patient's family/caregivers. Current Length of Stay: 2 day(s)  Current Patient Status: Inpatient   Certification Statement: The patient will continue to require additional inpatient hospital stay due to Management of stroke  Discharge Plan: Anticipate discharge in 24-48 hrs to rehab facility. Code Status: Level 3 - DNAR and DNI    Subjective:   Patient seen and examined  Comfortable sitting in chair  No chest pain or shortness of breath  No event overnight    Objective:     Vitals:   Temp (24hrs), Av.9 °F (36.6 °C), Min:97.9 °F (36.6 °C), Max:97.9 °F (36.6 °C)    Temp:  [97.9 °F (36.6 °C)] 97.9 °F (36.6 °C)  HR:  [78-96] 78  Resp:  [18] 18  BP: (169-175)/(76-80) 169/79  SpO2:  [96 %-98 %] 97 %  Body mass index is 33.11 kg/m².      Input and Output Summary (last 24 hours):   No intake or output data in the 24 hours ending 23 1237    Physical Exam:   Physical Exam   Patient is awake alert no acute distress l  Baseline expressive aphasia  Lung clear to auscultation bilateral  Heart positive S1-S2 no murmur  Abdomen soft nontender  Lower extremities no edema      Additional Data:     Labs:  Results from last 7 days   Lab Units 23  0528   WBC Thousand/uL 7.13   HEMOGLOBIN g/dL 13.2   HEMATOCRIT % 37.4   PLATELETS Thousands/uL 274   NEUTROS PCT % 60   LYMPHS PCT % 21   MONOS PCT % 12   EOS PCT % 5     Results from last 7 days   Lab Units 23  0528   SODIUM mmol/L 136   POTASSIUM mmol/L 4.1   CHLORIDE mmol/L 104   CO2 mmol/L 21   BUN mg/dL 51*   CREATININE mg/dL 1.73*   ANION GAP mmol/L 11   CALCIUM mg/dL 9.8   GLUCOSE RANDOM mg/dL 84     Results from last 7 days   Lab Units 23  1314   INR  0.98     Results from last 7 days   Lab Units 23  1224 23  0603 23  1576 09/04/23  1727 09/04/23  1216 09/04/23  0627 09/03/23  2103 09/03/23  1759 09/03/23  1308 09/03/23  1249   POC GLUCOSE mg/dl 169* 123 99 122 284* 109 119 194* 134 137               Lines/Drains:  Invasive Devices     Peripheral Intravenous Line  Duration           Peripheral IV 09/04/23 Distal;Dorsal (posterior); Right Forearm 1 day                      Imaging: Brain MRI reviewed    Recent Cultures (last 7 days):         Last 24 Hours Medication List:   Current Facility-Administered Medications   Medication Dose Route Frequency Provider Last Rate   • acetaminophen  650 mg Oral Q6H PRN Catrina Abdalla PA-C     • allopurinol  100 mg Oral Daily Reynaldo Santos MD     • aluminum-magnesium hydroxide-simethicone  30 mL Oral Q6H PRN Reynaldo Santos MD     • amLODIPine  5 mg Oral Daily Shira Whittaker DO     • aspirin  81 mg Oral Daily Izzy Catherine DO     • cinacalcet  30 mg Oral Every Other Day Reynaldo Santos MD     • [START ON 9/9/2023] cloNIDine  1 patch Transdermal Weekly Reynaldo Santos MD     • heparin (porcine)  5,000 Units Subcutaneous Q8H [facility] Reynaldo Santos MD     • insulin glargine  10 Units Subcutaneous HS Shira Whittaker DO     • insulin lispro  3 Units Subcutaneous TID With Meals Reynaldo Santos MD     • labetalol  300 mg Oral Q12H [facility] Shira Whittaker DO     • lidocaine  2 patch Topical Daily Izzy Catherine DO     • losartan  100 mg Oral Daily Izzy Catherine DO     • ondansetron  4 mg Intravenous Q6H PRN Reynaldo Santos MD     • polyethylene glycol  17 g Oral Daily Reynaldo Santos MD     • pravastatin  80 mg Oral Daily With Lili Massed, DO     • ticagrelor  90 mg Oral Q12H [address] [facility] [address], DO     • traZODone  50 mg Oral HS Reynaldo Santos MD          Today, Patient Was Seen By: Shira Whittaker DO    **Please Note: This note may have been constructed using a voice recognition system. **

## 2023-09-06 ENCOUNTER — ANESTHESIA EVENT (INPATIENT)
Dept: PERIOP | Facility: HOSPITAL | Age: 78
DRG: 034 | End: 2023-09-06
Payer: COMMERCIAL

## 2023-09-06 ENCOUNTER — APPOINTMENT (INPATIENT)
Dept: RADIOLOGY | Facility: HOSPITAL | Age: 78
DRG: 034 | End: 2023-09-06
Payer: COMMERCIAL

## 2023-09-06 LAB
ABO GROUP BLD: NORMAL
ANION GAP SERPL CALCULATED.3IONS-SCNC: 9 MMOL/L
BACTERIA UR QL AUTO: ABNORMAL /HPF
BILIRUB UR QL STRIP: NEGATIVE
BLD GP AB SCN SERPL QL: NEGATIVE
BUN SERPL-MCNC: 47 MG/DL (ref 5–25)
CALCIUM SERPL-MCNC: 9.7 MG/DL (ref 8.4–10.2)
CHLORIDE SERPL-SCNC: 102 MMOL/L (ref 96–108)
CLARITY UR: ABNORMAL
CO2 SERPL-SCNC: 23 MMOL/L (ref 21–32)
COLOR UR: YELLOW
CREAT SERPL-MCNC: 2.26 MG/DL (ref 0.6–1.3)
ERYTHROCYTE [DISTWIDTH] IN BLOOD BY AUTOMATED COUNT: 15.4 % (ref 11.6–15.1)
EST. AVERAGE GLUCOSE BLD GHB EST-MCNC: 163 MG/DL
GFR SERPL CREATININE-BSD FRML MDRD: 20 ML/MIN/1.73SQ M
GLUCOSE SERPL-MCNC: 129 MG/DL (ref 65–140)
GLUCOSE SERPL-MCNC: 148 MG/DL (ref 65–140)
GLUCOSE SERPL-MCNC: 189 MG/DL (ref 65–140)
GLUCOSE SERPL-MCNC: 96 MG/DL (ref 65–140)
GLUCOSE UR STRIP-MCNC: NEGATIVE MG/DL
HBA1C MFR BLD: 7.3 %
HCT VFR BLD AUTO: 35.6 % (ref 34.8–46.1)
HGB BLD-MCNC: 12.4 G/DL (ref 11.5–15.4)
HGB UR QL STRIP.AUTO: NEGATIVE
HYALINE CASTS #/AREA URNS LPF: ABNORMAL /LPF
KETONES UR STRIP-MCNC: NEGATIVE MG/DL
LEUKOCYTE ESTERASE UR QL STRIP: ABNORMAL
MCH RBC QN AUTO: 31.6 PG (ref 26.8–34.3)
MCHC RBC AUTO-ENTMCNC: 34.8 G/DL (ref 31.4–37.4)
MCV RBC AUTO: 91 FL (ref 82–98)
NITRITE UR QL STRIP: NEGATIVE
NON-SQ EPI CELLS URNS QL MICRO: ABNORMAL /HPF
PH UR STRIP.AUTO: 6 [PH]
PLATELET # BLD AUTO: 282 THOUSANDS/UL (ref 149–390)
PMV BLD AUTO: 10.6 FL (ref 8.9–12.7)
POTASSIUM SERPL-SCNC: 4.3 MMOL/L (ref 3.5–5.3)
PROT UR STRIP-MCNC: ABNORMAL MG/DL
RBC # BLD AUTO: 3.93 MILLION/UL (ref 3.81–5.12)
RBC #/AREA URNS AUTO: ABNORMAL /HPF
RH BLD: NEGATIVE
SODIUM SERPL-SCNC: 134 MMOL/L (ref 135–147)
SP GR UR STRIP.AUTO: 1.02 (ref 1–1.03)
SPECIMEN EXPIRATION DATE: NORMAL
UROBILINOGEN UR STRIP-ACNC: <2 MG/DL
WBC # BLD AUTO: 8.27 THOUSAND/UL (ref 4.31–10.16)
WBC #/AREA URNS AUTO: ABNORMAL /HPF

## 2023-09-06 PROCEDURE — 99222 1ST HOSP IP/OBS MODERATE 55: CPT | Performed by: INTERNAL MEDICINE

## 2023-09-06 PROCEDURE — 99223 1ST HOSP IP/OBS HIGH 75: CPT | Performed by: INTERNAL MEDICINE

## 2023-09-06 PROCEDURE — 85027 COMPLETE CBC AUTOMATED: CPT | Performed by: PHYSICIAN ASSISTANT

## 2023-09-06 PROCEDURE — 97116 GAIT TRAINING THERAPY: CPT

## 2023-09-06 PROCEDURE — 86850 RBC ANTIBODY SCREEN: CPT | Performed by: PHYSICIAN ASSISTANT

## 2023-09-06 PROCEDURE — 86901 BLOOD TYPING SEROLOGIC RH(D): CPT | Performed by: PHYSICIAN ASSISTANT

## 2023-09-06 PROCEDURE — G1004 CDSM NDSC: HCPCS

## 2023-09-06 PROCEDURE — 86900 BLOOD TYPING SEROLOGIC ABO: CPT | Performed by: PHYSICIAN ASSISTANT

## 2023-09-06 PROCEDURE — 97112 NEUROMUSCULAR REEDUCATION: CPT

## 2023-09-06 PROCEDURE — 81001 URINALYSIS AUTO W/SCOPE: CPT | Performed by: INTERNAL MEDICINE

## 2023-09-06 PROCEDURE — 80048 BASIC METABOLIC PNL TOTAL CA: CPT | Performed by: PHYSICIAN ASSISTANT

## 2023-09-06 PROCEDURE — 70450 CT HEAD/BRAIN W/O DYE: CPT

## 2023-09-06 PROCEDURE — 99232 SBSQ HOSP IP/OBS MODERATE 35: CPT | Performed by: PSYCHIATRY & NEUROLOGY

## 2023-09-06 PROCEDURE — 76775 US EXAM ABDO BACK WALL LIM: CPT

## 2023-09-06 PROCEDURE — 99233 SBSQ HOSP IP/OBS HIGH 50: CPT | Performed by: INTERNAL MEDICINE

## 2023-09-06 PROCEDURE — 92507 TX SP LANG VOICE COMM INDIV: CPT

## 2023-09-06 PROCEDURE — 82948 REAGENT STRIP/BLOOD GLUCOSE: CPT

## 2023-09-06 RX ORDER — LABETALOL HYDROCHLORIDE 5 MG/ML
10 INJECTION, SOLUTION INTRAVENOUS
Status: DISCONTINUED | OUTPATIENT
Start: 2023-09-06 | End: 2023-09-09 | Stop reason: HOSPADM

## 2023-09-06 RX ORDER — CHLORHEXIDINE GLUCONATE ORAL RINSE 1.2 MG/ML
15 SOLUTION DENTAL ONCE
Status: DISCONTINUED | OUTPATIENT
Start: 2023-09-06 | End: 2023-09-07 | Stop reason: HOSPADM

## 2023-09-06 RX ORDER — SODIUM CHLORIDE, SODIUM GLUCONATE, SODIUM ACETATE, POTASSIUM CHLORIDE, MAGNESIUM CHLORIDE, SODIUM PHOSPHATE, DIBASIC, AND POTASSIUM PHOSPHATE .53; .5; .37; .037; .03; .012; .00082 G/100ML; G/100ML; G/100ML; G/100ML; G/100ML; G/100ML; G/100ML
50 INJECTION, SOLUTION INTRAVENOUS CONTINUOUS
Status: DISCONTINUED | OUTPATIENT
Start: 2023-09-06 | End: 2023-09-07

## 2023-09-06 RX ORDER — CEFAZOLIN SODIUM 1 G/50ML
1000 SOLUTION INTRAVENOUS ONCE
Status: CANCELLED | OUTPATIENT
Start: 2023-09-06 | End: 2023-09-06

## 2023-09-06 RX ORDER — INSULIN LISPRO 100 [IU]/ML
1-5 INJECTION, SOLUTION INTRAVENOUS; SUBCUTANEOUS
Status: DISCONTINUED | OUTPATIENT
Start: 2023-09-06 | End: 2023-09-09 | Stop reason: HOSPADM

## 2023-09-06 RX ADMIN — ALLOPURINOL 100 MG: 100 TABLET ORAL at 08:56

## 2023-09-06 RX ADMIN — ESCITALOPRAM OXALATE 10 MG: 10 TABLET ORAL at 08:56

## 2023-09-06 RX ADMIN — LABETALOL HYDROCHLORIDE 300 MG: 200 TABLET, FILM COATED ORAL at 22:21

## 2023-09-06 RX ADMIN — INSULIN LISPRO 3 UNITS: 100 INJECTION, SOLUTION INTRAVENOUS; SUBCUTANEOUS at 12:31

## 2023-09-06 RX ADMIN — LIDOCAINE 2 PATCH: 700 PATCH TOPICAL at 08:56

## 2023-09-06 RX ADMIN — LOSARTAN POTASSIUM 100 MG: 50 TABLET, FILM COATED ORAL at 08:55

## 2023-09-06 RX ADMIN — LABETALOL HYDROCHLORIDE 300 MG: 200 TABLET, FILM COATED ORAL at 08:55

## 2023-09-06 RX ADMIN — TICAGRELOR 90 MG: 90 TABLET ORAL at 08:57

## 2023-09-06 RX ADMIN — CINACALCET 30 MG: 30 TABLET, FILM COATED ORAL at 08:55

## 2023-09-06 RX ADMIN — INSULIN GLARGINE 10 UNITS: 100 INJECTION, SOLUTION SUBCUTANEOUS at 22:21

## 2023-09-06 RX ADMIN — SODIUM CHLORIDE 1000 ML: 0.9 INJECTION, SOLUTION INTRAVENOUS at 12:32

## 2023-09-06 RX ADMIN — HEPARIN SODIUM 5000 UNITS: 5000 INJECTION INTRAVENOUS; SUBCUTANEOUS at 22:20

## 2023-09-06 RX ADMIN — HEPARIN SODIUM 5000 UNITS: 5000 INJECTION INTRAVENOUS; SUBCUTANEOUS at 13:41

## 2023-09-06 RX ADMIN — POLYETHYLENE GLYCOL 3350 17 G: 17 POWDER, FOR SOLUTION ORAL at 08:56

## 2023-09-06 RX ADMIN — TICAGRELOR 90 MG: 90 TABLET ORAL at 22:26

## 2023-09-06 RX ADMIN — INSULIN LISPRO 3 UNITS: 100 INJECTION, SOLUTION INTRAVENOUS; SUBCUTANEOUS at 17:30

## 2023-09-06 RX ADMIN — SODIUM CHLORIDE, SODIUM GLUCONATE, SODIUM ACETATE, POTASSIUM CHLORIDE, MAGNESIUM CHLORIDE, SODIUM PHOSPHATE, DIBASIC, AND POTASSIUM PHOSPHATE 50 ML/HR: .53; .5; .37; .037; .03; .012; .00082 INJECTION, SOLUTION INTRAVENOUS at 13:43

## 2023-09-06 RX ADMIN — PRAVASTATIN SODIUM 80 MG: 80 TABLET ORAL at 17:30

## 2023-09-06 RX ADMIN — ASPIRIN 81 MG CHEWABLE TABLET 81 MG: 81 TABLET CHEWABLE at 08:56

## 2023-09-06 RX ADMIN — AMLODIPINE BESYLATE 5 MG: 5 TABLET ORAL at 08:55

## 2023-09-06 RX ADMIN — TRAZODONE HYDROCHLORIDE 50 MG: 50 TABLET ORAL at 22:22

## 2023-09-06 RX ADMIN — INSULIN LISPRO 3 UNITS: 100 INJECTION, SOLUTION INTRAVENOUS; SUBCUTANEOUS at 08:57

## 2023-09-06 NOTE — CONSULTS
Endocrinology Consultation  Hipolito Farley 70 y.o. female MRN: [id number]  Unit/Bed#: [facility] [place] 710-01 Encounter: [id number]      Assessment/Plan     Assessment: This is a 70y.o.-year-old female with  type 2 diabetes, pending TCAR in the setting setting of recent CVA/recurrent TIA-like symptoms. Diabetic control has improved remarkably from June, A1c 10.3 ->7.3 via combination of dietary changes and glipizide. Plan:  --No endocrine contraindication to upcoming TCAR  --Hold home glipizide  --Continue Lantus 10 units only, lispro 3 units 3 times daily before every meal Algorithm 1 correction scale  --Hypoglycemia protocol  --Will continue to follow and make adjustments as appropriate      CC: Diabetes Consult    History of Present Illness     HPI: Hipolito Farley is a 70y.o. year old female with type 2 diabetes for proximately 10 years, on oral therapy, with complication of neuropathy, PVD, CKD 4 CVA. Recent A1c 7.3 this admission. Also with history of GIST, CVA/TIA intermittent aphasia, HTN, bladder cancer, obesity, carotid artery stenosis, hypercalcemia, secondary hyperparathyroidism, former smoker. Presented to ED in 9/3 with new right-sided weakness admitted as a stroke elevation. TCAR is planned for 9/7. Has been initiated on insulin this admission, current treatment Lantus 10u nightly, lispro 3 times a day before every meal    Patient was diagnosed with diabetes years ago. Distantly on metformin, which was discontinued due to advancement of CKD around 2019, recently not on anything. Diabetes uncontrolled this past year, peaking at A1c 10.3 this June. After CVA admission in July 2023, was willing to start glipizide 5 mg daily. Current A1c 7.3. She has been in and out of rehab since July, reports diet much different than previous.     Son at bedside    Inpatient consult to Endocrinology  Consult performed by: Benja Diamond DO  Consult ordered by: Liya Massey PA-C          Review of Systems    Historical Information   Past Medical History:   Diagnosis Date   • Arthritis    • Chronic kidney disease    • Diabetes mellitus ([address])    • Endometriosis    • High cholesterol    • Hypertension    • Kidney disease, chronic, stage III (moderate, EGFR 30-59 ml/min) ([facility])    • Lumbar disc herniation    • Neuropathy    • Peripheral vascular disease ([facility])    • Pneumonia    • Shoulder injury     left   • Spinal stenosis    • Stroke ([address]) 2015    Memory loss     Past Surgical History:   Procedure Laterality Date   • CARDIAC CATHETERIZATION     • COLONOSCOPY     • FL RETROGRADE PYELOGRAM  4/6/2020   • FRACTURE SURGERY Right     ankle   • OVARIAN CYST SURGERY     • SC CYSTO BLADDER W/URETERAL CATHETERIZATION Bilateral 4/6/2020    Procedure: CYSTOSCOPY WITH RETROGRADE PYELOGRAM;  Surgeon: Beatriz Goyal MD;  Location: [facility];  Service: Urology   • SC CYSTO W/INSERT URETERAL STENT Right 4/6/2020    Procedure: INSERTION STENT URETERAL;  Surgeon: Beatriz Goyal MD;  [facility];  Service: Urology   • SC CYSTO W/REMOVAL OF TUMORS SMALL N/A 4/6/2020    Procedure: TRANSURETHRAL RESECTION OF BLADDER TUMOR (TURBT);   Surgeon: Beatriz Goyal MD;  [facility];  Service: Urology   • ROTATOR CUFF REPAIR Left    • TONSILLECTOMY       Social History   Social History     Substance and Sexual Activity   Alcohol Use Never     Social History     Substance and Sexual Activity   Drug Use Never     Social History     Tobacco Use   Smoking Status Former   • Packs/day: 2.00   • Years: 40.00   • Total pack years: 80.00   • Types: Cigarettes   • Start date: 1966   • Quit date: 7/27/2020   • Years since quitting: 3.1   Smokeless Tobacco Never     Family History:   Family History   Problem Relation Age of Onset   • Hypertension Mother    • Heart disease Mother         Valvular   • Hyperlipidemia Mother    • Hypertension Father    • Heart defect Father         Cardiomegaly   • Stroke Sister         Cerebrovascular Accident   • Arthritis Brother    • Other Brother         Back Disorder       Meds/Allergies   Current Facility-Administered Medications   Medication Dose Route Frequency Provider Last Rate Last Admin   • acetaminophen (TYLENOL) tablet 650 mg  650 mg Oral Q6H PRN Cinthya Maloney PA-C   650 mg at 09/05/23 0248   • allopurinol (ZYLOPRIM) tablet 100 mg  100 mg Oral Daily Guanakito Mitchell MD   100 mg at 09/06/23 0856   • aluminum-magnesium hydroxide-simethicone (MAALOX) oral suspension 30 mL  30 mL Oral Q6H PRN Guanakito Mitchell MD       • amLODIPine (NORVASC) tablet 5 mg  5 mg Oral Daily Arnoldo Guidry DO   5 mg at 09/06/23 4045   • aspirin chewable tablet 81 mg  81 mg Oral Daily Linda Barragan DO   81 mg at 09/06/23 0856   • chlorhexidine (PERIDEX) 0.12 % oral rinse 15 mL  15 mL Swish & Spit Once Zoila Gardner PA-C       • cinacalcet (SENSIPAR) tablet 30 mg  30 mg Oral Every Other Day Guanakito Mitchell MD   30 mg at 09/06/23 0855   • [START ON 9/9/2023] cloNIDine (CATAPRES-TTS-3) 0.3 mg/24 hr TD weekly patch  1 patch Transdermal Weekly Guanakito Mitchell MD       • escitalopram (LEXAPRO) tablet 10 mg  10 mg Oral Daily Linda Barragan DO   10 mg at 09/06/23 0856   • heparin (porcine) subcutaneous injection 5,000 Units  5,000 Units Subcutaneous [facility] Guanakito Mitchell MD   5,000 Units at 09/06/23 1341   • insulin glargine (LANTUS) subcutaneous injection 10 Units 0.1 mL  10 Units Subcutaneous HS Arnoldo Guidry DO   10 Units at 09/05/23 2321   • insulin lispro (HumaLOG) 100 units/mL subcutaneous injection 1-5 Units  1-5 Units Subcutaneous TID HASEEB Rivas DO       • insulin lispro (HumaLOG) 100 units/mL subcutaneous injection 3 Units  3 Units Subcutaneous TID With Meals Guanakito Mitchell MD   3 Units at 09/06/23 1231   • labetalol (NORMODYNE) injection 10 mg  10 mg Intravenous Q10 Min PRN Linda Barragan DO       • labetalol (NORMODYNE) tablet 300 mg  300 mg Oral Q12H [address] Johnathon, DO   300 mg at 09/06/23 0855   • lidocaine (LIDODERM) 5 % patch 2 patch  2 patch Topical Daily Malloryvicky Gomez, DO   2 patch at 09/06/23 0856   • multi-electrolyte (PLASMALYTE-A/ISOLYTE-S PH 7.4) IV solution  50 mL/hr Intravenous Continuous Evelina Ray MD 50 mL/hr at 09/06/23 1343 50 mL/hr at 09/06/23 1343   • ondansetron (ZOFRAN) injection 4 mg  4 mg Intravenous Q6H PRN Yandel Saleh MD       • polyethylene glycol (MIRALAX) packet 17 g  17 g Oral Daily Yandel Saleh MD   17 g at 09/06/23 0856   • pravastatin (PRAVACHOL) tablet 80 mg  80 mg Oral Daily With [employer], DO   80 mg at 09/05/23 1647   • ticagrelor (BRILINTA) tablet 90 mg  90 mg Oral [facility] Mallory Gomez, DO   90 mg at 09/06/23 5622   • traZODone (DESYREL) tablet 50 mg  50 mg Oral HS Yandel Saleh MD   50 mg at 09/05/23 2256     Allergies   Allergen Reactions   • Fenofibrate Other (See Comments)      blood in urine  hx  Kidney Failure   • Colesevelam Other (See Comments)      leg pains   • Colestipol Itching and Other (See Comments)      Swelling lower legs   • Ezetimibe GI Intolerance   • Statins Myalgia       Objective   Vitals: Blood pressure 147/63, pulse 65, temperature 98 °F (36.7 °C), resp. rate 16, height 4' 10" (1.473 m), weight 71.9 kg (158 lb 6.4 oz), SpO2 97 %. No intake or output data in the 24 hours ending 09/06/23 1644  Invasive Devices     Peripheral Intravenous Line  Duration           Peripheral IV 09/04/23 Distal;Dorsal (posterior); Right Forearm 2 days                Physical Exam  Constitutional:       General: She is not in acute distress. Appearance: Normal appearance. She is obese. She is not ill-appearing, toxic-appearing or diaphoretic. HENT:      Head: Normocephalic and atraumatic. Nose: Nose normal.   Eyes:      Extraocular Movements: Extraocular movements intact. Conjunctiva/sclera: Conjunctivae normal.      Pupils: Pupils are equal, round, and reactive to light. Pulmonary:      Effort: Pulmonary effort is normal. No respiratory distress. Abdominal:      General: There is no distension. Musculoskeletal:         General: No deformity. Right lower leg: No edema. Left lower leg: No edema. Skin:     General: Skin is warm and dry. Neurological:      Mental Status: She is alert. Mental status is at baseline. Comments: Intermittent aphasia   Psychiatric:         Mood and Affect: Mood normal.         Behavior: Behavior normal.         Thought Content: Thought content normal.         The history was obtained from the review of the chart, patient and family. Lab Results:   Results from last 7 days   Lab Units 09/03/23  1314   HEMOGLOBIN A1C % 7.3*     Lab Results   Component Value Date    WBC 8.27 09/06/2023    HGB 12.4 09/06/2023    HCT 35.6 09/06/2023    MCV 91 09/06/2023     09/06/2023     Lab Results   Component Value Date/Time    BUN 47 (H) 09/06/2023 05:11 AM    BUN 45 (H) 10/20/2020 07:57 AM    K 4.3 09/06/2023 05:11 AM    K 3.8 10/20/2020 07:57 AM     09/06/2023 05:11 AM    CL 98 10/20/2020 07:57 AM    CO2 23 09/06/2023 05:11 AM    CO2 23 10/20/2020 07:57 AM    CREATININE 2.26 (H) 09/06/2023 05:11 AM    AST 24 08/21/2023 09:40 PM    AST 14 09/08/2020 09:21 AM    ALT 27 08/21/2023 09:40 PM    ALT 13 09/08/2020 09:21 AM    TP 7.8 08/21/2023 09:40 PM    TP 7.3 09/08/2020 09:21 AM    ALB 4.5 08/21/2023 09:40 PM    GLOB 2.8 09/08/2020 09:21 AM    GLOB 3.1 02/25/2019 09:00 AM     No results for input(s): "CHOL", "HDL", "LDL", "TRIG", "VLDL" in the last 72 hours. No results found for: "Terryann Kuster", "[id number]"  POC Glucose (mg/dl)   Date Value   09/06/2023 96   09/06/2023 148 (H)   09/06/2023 189 (H)   09/05/2023 127   09/05/2023 78   09/05/2023 169 (H)   09/05/2023 123   09/04/2023 99   09/04/2023 122   09/04/2023 284 (H)       Imaging Studies: I have personally reviewed pertinent reports.       Cornell Dent  Endocrinology PGY-4    Please Tigertext questions to the physician covering the "[employer]" Role. Thank you.

## 2023-09-06 NOTE — RESTORATIVE TECHNICIAN NOTE
Restorative Technician Note      Patient Name: Bozena Cristina     Note Type: Mobility  Patient Position Upon Consult: Bedside chair  Activity Performed: Ambulated; Dangled; Stood  Assistive Device: Roller walker; Other (Comment) (Assist x2)  Education Provided: Yes  Patient Position at End of Consult: Bedside chair;  All needs within reach; Bed/Chair alarm activated    Britni TIJERINA, Restorative Technician, [employer]

## 2023-09-06 NOTE — ASSESSMENT & PLAN NOTE
Lab Results   Component Value Date    EGFR 20 09/06/2023    EGFR 28 09/04/2023    EGFR 24 09/03/2023    CREATININE 2.26 (H) 09/06/2023    CREATININE 1.73 (H) 09/04/2023    CREATININE 1.93 (H) 09/03/2023     · Kidney function stable within baseline   · Over mildly elevated creatinine today  · Avoid nephrotoxins and hypotension  · Continue to monitor

## 2023-09-06 NOTE — RESTORATIVE TECHNICIAN NOTE
Restorative Technician Note      Patient Name: Uziel Crowder     Note Type: Mobility  Patient Position Upon Consult: Bedside chair  Activity Performed: Ambulated; Dangled; Stood  Assistive Device: Other (Comment) (Assist x2 with chair follow. Assisted PT Cally Turcios.)  Education Provided: Yes  Patient Position at End of Consult: Bedside chair;  All needs within reach; Bed/Chair alarm activated    Marcia TIJERINA, Restorative Technician, [employer]

## 2023-09-06 NOTE — PROGRESS NOTES
NEUROLOGY RESIDENCY PROGRESS NOTE     Name: Caroilna Moore   Age & Sex: 70 y.o. female   MRN: [id number]  Unit/Bed#: [facility] 710-01   Encounter: [id number]    Recommendations for outpatient neurological follow up have yet to be determined. Pending for discharge: Vascular surgery intervention - plan for 9/7. ASSESSMENT & PLAN     * Acute CVA (cerebrovascular accident) [facility])  Assessment & Plan  68YO R-handed female with history of HTN, HLD, CKD, recent acute CVA of L occipital lobe and L MCA territory with residual expressive aphasia and R hemianopia (8/15/2023), advanced atherosclerotic disease affecting multiple neurovascular structures who presented as a stroke alert on 9/3/2023 for acute RLE and RUE weakness. This occurred while she was working with PT at [facility]. Denied headache, dizziness, numbness/tingling. Initial NIHSS 8 - inability to accurately state month and age, R hemianopia, RLE weakness, RLE decreased sensation, moderate expressive aphasia. Patient has been on ASA/Brilinta since 7/29/2023 for CVA. No TNK administered given complete resolution of acute symptoms. CTH showed stable subacute infarcts within the L occipital and parietal lobes. CTA showed stable advanced atherosclerotic disease throughout cervical and intracranial vasculature with multiple areas of severe stenosis most prominent within the ICA bulb bilaterally; no significant change compared with prior examination. Patient was recently scheduled for TCAR with vascular surgery but this was canceled due to elevated A1c level. Admitted on stroke pathway. MRI brain concerning for acute stroke, etiology most likely associated with severe ICA stenosis. Vascular surgery team consulted for intervention. Prior work-up:  -Echo, 8/18/23: EF 62%. G1DD, LA normal size, no major valvular dysfunction. -MRI brain wo contrast, 9/5/23: 1.  Scattered multifocal infarctions predominantly in the left occipital lobe as well as in the left frontal and parietal lobes in the watershed territory, slightly increased in size and number from the prior study indicative of trace crescendo   infarctions/progressive infarctions associated with previously present infarctions in these region. 2. Trace T1 high signal in the left occipital lobe is worrisome for petechial microhemorrhage. No significant mass effect. Recommend follow-up repeat head CT in 24 hours to assess for stability. 3. Moderate, chronic microangiopathy. Lab Results   Component Value Date    HGBA1C 7.3 (H) 09/03/2023    LDLCALC 59 09/03/2023       Impression: MRI brain demonstrated multifocal acute infarcts in the left occipital, left frontal, left parietal regions and watershed territories. Etiology suspected to be secondary to hypoperfusion in the setting of severe ICA stenosis. Plan, Stroke Pathway:  · Discussed with attending Dr. Diogo Holloway  · Continue home ASA/Brilinta  · Continue home Pravastatin 80mg daily with dinner  · SBP <160 but low threshold for further fluid boluses if symptomatic with lower SBPs  · Vascular surgery consulted for urgent intervention of ICA stenosis -plan for intervention on Thursday 9/7. However would need to monitor for hemorrhage as there is concern for high risk of hemorrhagic conversion. Will repeat CT head without contrast this afternoon. · Given concerns for poststroke depression/anxiety, started Lexapro 10 mg daily. · Normoglycemia, normothermia. · Monitor on telemetry  · Bedside swallow eval  · Stat CT Head for change in neuro status. · DVT ppx, SCDs  • PT/OT/PM&R evaluations   • Medical management as per primary team appreciated. SUBJECTIVE     Patient was seen and examined. No acute events overnight. Patient remains in acute emotional distress due to inability to communicate as she is limited by expressive aphasia and states she cannot write it out.      Review of Systems   Unable to perform ROS: Other (emotional distress; mixed expressive and receptive aphasia)   Neurological: Positive for speech difficulty. OBJECTIVE     Patient ID: Milly Michael is a 70 y.o. female. Vitals:    23 0713 23 1219 23 1245 23 1315   BP: (!) 192/85 132/57 157/64 162/52   Pulse: 92 75 67 67   Resp: 16      Temp:       TempSrc:       SpO2: 98% 97% 96% 97%   Weight:       Height:          Temperature:   Temp (24hrs), Av °F (36.7 °C), Min:98 °F (36.7 °C), Max:98 °F (36.7 °C)    Temperature: 98 °F (36.7 °C)    Physical examination and neurological examinations were deferred this AM as patient was in emotional distress. However able to appreciate the patient was alert and appropriately responsive. Patient able to hold LUE and LLE against gravity, but in setting of receptive aphasia and emotional distress, she did not partake in resistance. LABORATORY DATA     Labs: I have personally reviewed pertinent reports. Results from last 7 days   Lab Units 23  0511 23  0528 23  1804 23  1314   WBC Thousand/uL 8.27 7.13  --  6.70   HEMOGLOBIN g/dL 12.4 13.2  --  11.7   HEMATOCRIT % 35.6 37.4  --  34.7*   PLATELETS Thousands/uL 282 274 287 267   NEUTROS PCT %  --  60  --   --    MONOS PCT %  --  12  --   --    EOS PCT %  --  5  --   --       Results from last 7 days   Lab Units 23  0511 23  0528 23  1314   SODIUM mmol/L 134* 136 130*   POTASSIUM mmol/L 4.3 4.1 4.2   CHLORIDE mmol/L 102 104 100   CO2 mmol/L 23 21 24   BUN mg/dL 47* 51* 65*   CREATININE mg/dL 2.26* 1.73* 1.93*   CALCIUM mg/dL 9.7 9.8 8.9              Results from last 7 days   Lab Units 23  1314   INR  0.98   PTT seconds 34               IMAGING & DIAGNOSTIC TESTING     Radiology Results: I have personally reviewed pertinent films in PACS    MRI brain wo contrast   Final Result by Rajani Giles MD ([id number])         1.  Scattered multifocal infarctions predominantly in the left occipital lobe as well as in the left frontal and parietal lobes in the watershed territory, slightly increased in size and number from the prior study indicative of trace crescendo    infarctions/progressive infarctions associated with previously present infarctions in these region. 2. Trace T1 high signal in the left occipital lobe is worrisome for petechial microhemorrhage. No significant mass effect. Recommend follow-up repeat head CT in 24 hours to assess for stability. 3. Moderate, chronic microangiopathy. Workstation performed: [id number]         XR spine lumbar 2 or 3 views injury   Final Result by Penny Cordova MD (09/05 1237)      Transitional lumbosacral anatomy   Degenerative disease seen at the L2-3   Degenerative disease seen at L4-5   Sacralization of the L5 vertebra   No acute compression collapse of the vetebra      Workstation performed: [id number]         CTA stroke alert (head/neck)   Final Result by Dada Martin DO (09/03 1325)      Stable advanced atherosclerotic disease throughout the cervical and intracranial vasculature with multiple areas of severe stenosis most prominent within the internal carotid artery bulb bilaterally. Advanced atherosclerotic disease of the distal left vertebral artery with occlusion proximal to the vertebrobasilar junction and severe high-grade stenosis of the proximal basilar. Additional areas of moderate to advanced atherosclerotic disease    scattered throughout the cervical and intracranial vasculature. Overall no significant change when compared with the prior examination. Findings were directly discussed with Elvia Quesada  at 1:20 p.m. Workstation performed: [id number]         CT stroke alert brain   Final Result by Dada Martin DO (09/03 1326)      Stable subacute infarcts within the left occipital and parietal lobes. No mass effect or hemorrhagic transformation.       Additional moderate periventricular white matter change consistent with chronic microangiopathy. No hemorrhage. Findings were directly discussed with Elvia Quesada  at 1:20 p.m. Workstation performed: [id number]         CT head wo contrast    (Results Pending)   US kidney and bladder    (Results Pending)       Other Diagnostic Testing: I have personally reviewed pertinent reports.       ACTIVE MEDICATIONS     Current Facility-Administered Medications   Medication Dose Route Frequency   • acetaminophen (TYLENOL) tablet 650 mg  650 mg Oral Q6H PRN   • allopurinol (ZYLOPRIM) tablet 100 mg  100 mg Oral Daily   • aluminum-magnesium hydroxide-simethicone (MAALOX) oral suspension 30 mL  30 mL Oral Q6H PRN   • amLODIPine (NORVASC) tablet 5 mg  5 mg Oral Daily   • aspirin chewable tablet 81 mg  81 mg Oral Daily   • chlorhexidine (PERIDEX) 0.12 % oral rinse 15 mL  15 mL Swish & Spit Once   • cinacalcet (SENSIPAR) tablet 30 mg  30 mg Oral Every Other Day   • [START ON 9/9/2023] cloNIDine (CATAPRES-TTS-3) 0.3 mg/24 hr TD weekly patch  1 patch Transdermal Weekly   • escitalopram (LEXAPRO) tablet 10 mg  10 mg Oral Daily   • heparin (porcine) subcutaneous injection 5,000 Units  5,000 Units Subcutaneous Q8H [facility]   • insulin glargine (LANTUS) subcutaneous injection 10 Units 0.1 mL  10 Units Subcutaneous HS   • insulin lispro (HumaLOG) 100 units/mL subcutaneous injection 1-5 Units  1-5 Units Subcutaneous TID AC   • insulin lispro (HumaLOG) 100 units/mL subcutaneous injection 3 Units  3 Units Subcutaneous TID With Meals   • labetalol (NORMODYNE) tablet 300 mg  300 mg Oral Q12H JAVIER   • lidocaine (LIDODERM) 5 % patch 2 patch  2 patch Topical Daily   • multi-electrolyte (PLASMALYTE-A/ISOLYTE-S PH 7.4) IV solution  50 mL/hr Intravenous Continuous   • ondansetron (ZOFRAN) injection 4 mg  4 mg Intravenous Q6H PRN   • polyethylene glycol (MIRALAX) packet 17 g  17 g Oral Daily   • pravastatin (PRAVACHOL) tablet 80 mg  80 mg Oral Daily With [email or web address] • ticagrelor (BRILINTA) tablet 90 mg  90 mg Oral Q12H [address]   • traZODone (DESYREL) tablet 50 mg  50 mg Oral HS       Prior to Admission medications    Medication Sig Start Date End Date Taking?  Authorizing Provider   acetaminophen (TYLENOL) 500 mg tablet Take 650 mg by mouth every 6 (six) hours as needed for mild pain   Yes Historical Provider, MD   allopurinol (ZYLOPRIM) 100 mg tablet TAKE 1 TABLET BY MOUTH EVERY DAY 1/23/23  Yes Kianna Russo MD   amLODIPine (NORVASC) 10 mg tablet Take 1 tablet (10 mg total) by mouth daily 5/15/23  Yes Laurann Goodell, MD   aspirin 81 mg chewable tablet Chew 81 mg daily   Yes Historical Provider, MD   cinacalcet (SENSIPAR) 30 mg tablet Take 1 tablet (30 mg total) by mouth every other day 8/7/23 5/3/24 Yes Kianna Russo MD   cloNIDine (CATAPRES-TTS-3) 0.3 mg/24 hr PLACE 1 PATCH (0.3 MG TOTAL) ON THE SKIN ONCE A WEEK 5/1/23  Yes Laurann Goodell, MD   Heparin Sodium, Porcine, (heparin, porcine,) 5,000 units/mL Inject 5,000 Units under the skin every 8 (eight) hours   Yes Historical Provider, MD   insulin lispro (HumaLOG) 100 units/mL injection Inject under the skin   Yes Historical Provider, MD   lidocaine (LIDODERM) 5 % Apply 1 patch topically daily Remove & Discard patch within 12 hours or as directed by MD   Yes Historical Provider, MD   pravastatin (PRAVACHOL) 80 mg tablet Take 1 tablet (80 mg total) by mouth daily with dinner 8/19/23  Yes Braulio Duncan MD   ticagrelor (BRILINTA) 90 MG Take 1 tablet (90 mg total) by mouth every 12 (twelve) hours 8/19/23 9/18/23 Yes Braulio Duncan MD   traZODone (DESYREL) 50 mg tablet Take 1 tablet (50 mg total) by mouth daily at bedtime 8/19/23  Yes Braulio Ducnan MD   glipiZIDE (GLUCOTROL) 5 mg tablet Take 1 tablet (5 mg total) by mouth daily with breakfast 8/9/23 9/5/23 Yes Laurann Goodell, MD   glipiZIDE (GLUCOTROL) 5 mg tablet TAKE 1 TABLET BY MOUTH EVERY DAY WITH BREAKFAST 9/5/23   Noni SESAY Bill Leslie MD   Glucagon 1 MG/0.2ML SOAJ Inject 1 mg under the skin if needed (low blood sugar)    Historical Provider, MD   labetalol (NORMODYNE) 300 mg tablet Take 1 tablet (300 mg total) by mouth 2 (two) times a day  Patient taking differently: Take 400 mg by mouth 2 (two) times a day 5/15/23   Gillian Guerrero MD   losartan (COZAAR) 100 MG tablet Take 1 tablet (100 mg total) by mouth daily 7/17/23   Gillian Guerrero MD         VTE Pharmacologic Prophylaxis: Heparin  VTE Mechanical Prophylaxis: sequential compression device    ======    I have discussed the patient's history, physical exam findings, assessment, and plan in detail with attending, Dr. So Ma    Thank you for allowing me to participate in the care of your patient, Caroline Davis.     Mallory Gomez,   [facility] Neurology Residency, PGY-2

## 2023-09-06 NOTE — PLAN OF CARE
Problem: PHYSICAL THERAPY ADULT  Goal: Performs mobility at highest level of function for planned discharge setting. See evaluation for individualized goals. Description: Treatment/Interventions: ADL retraining, Functional transfer training, LE strengthening/ROM, Elevations, Therapeutic exercise, Endurance training, Patient/family training, Cognitive reorientation, Equipment eval/education, Bed mobility, Gait training, Compensatory technique education, Spoke to nursing, OT, Spoke to case management, Spoke to advanced practitioner          See flowsheet documentation for full assessment, interventions and recommendations. Outcome: Progressing  Note: Prognosis: Fair  Problem List: Decreased strength, Decreased endurance, Impaired balance, Decreased mobility, Decreased coordination, Decreased cognition, Impaired judgement, Decreased safety awareness, Obesity  Assessment: Pt agreeable to participate in PT session. Pt performed functional mobility and therex as outlined above. Upon first ambulation attempt with RW, pt with decreased R foot clearance and inability to keep R foot inside RW. Trial of pregait training of WS and forward/backward stepping with RLE. Pt with difficulty coordinating despite all cues and attempts. Opted for ambulating along L HR with A at trunk and A of 2nd person at RLE for step length, foot placement (pt tends to supinate), and R knee block. Pt with very poor anterior lateral weight shift bilaterally. Will continue to address same in future sessions. Son very happy with gait quality at HR. Pt left seated in chair with chair alarm, call bell, phone, and all personal needs within reach. Pt will continue to benefit from skilled acute care PT to further address their functional mobility limitations. D/C recommendations remain rehab.   Barriers to Discharge: Inaccessible home environment, Decreased caregiver support     PT Discharge Recommendation: Post acute rehabilitation services (return to [facility])    See flowsheet documentation for full assessment.

## 2023-09-06 NOTE — ASSESSMENT & PLAN NOTE
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 This occurred while she was working with PT at [facility]. Denied headache, dizziness, numbness/tingling. Initial NIHSS 8 - inability to accurately state month and age, R hemianopia, RLE weakness, RLE decreased sensation, moderate expressive aphasia. Patient has been on ASA/Brilinta since 7/29/2023 for CVA. No TNK administered given complete resolution of acute symptoms. CTH showed stable subacute infarcts within the L occipital and parietal lobes. CTA showed stable advanced atherosclerotic disease throughout cervical and intracranial vasculature with multiple areas of severe stenosis most prominent within the ICA bulb bilaterally; no significant change compared with prior examination. Patient was recently scheduled for [facility] with vascular surgery but this was canceled due to elevated A1c level. Admitted on stroke pathway. MRI brain concerning for acute stroke, etiology most likely associated with severe ICA stenosis. Vascular surgery team consulted for intervention. Prior work-up:  -Echo, 8/18/23: EF 62%. G1DD, LA normal size, no major valvular dysfunction. -MRI brain wo contrast, 9/5/23: 1. Scattered multifocal infarctions predominantly in the left occipital lobe as well as in the left frontal and parietal lobes in the watershed territory, slightly increased in size and number from the prior study indicative of trace crescendo   infarctions/progressive infarctions associated with previously present infarctions in these region. 2. Trace T1 high signal in the left occipital lobe is worrisome for petechial microhemorrhage. No significant mass effect.  Recommend follow-up repeat head CT in 24 hours to assess for stability. 3. Moderate, chronic microangiopathy. Lab Results   Component Value Date    HGBA1C 7.3 (H) 09/03/2023    LDLCALC 59 09/03/2023       Impression: MRI brain demonstrated multifocal acute infarcts in the left occipital, left frontal, left parietal regions and watershed territories. Etiology suspected to be secondary to hypoperfusion in the setting of severe ICA stenosis. Plan, Stroke Pathway:  · Discussed with attending Dr. Donal Fabry  · Continue home ASA/Brilinta  · Continue home Pravastatin 80mg daily with dinner  · SBP <160 but low threshold for further fluid boluses if symptomatic with lower SBPs  · Vascular surgery consulted for urgent intervention of ICA stenosis -plan for intervention on Thursday 9/7. However would need to monitor for hemorrhage as there is concern for high risk of hemorrhagic conversion. Will repeat CT head without contrast this afternoon. · Given concerns for poststroke depression/anxiety, started Lexapro 10 mg daily. · Normoglycemia, normothermia. · Monitor on telemetry  · Bedside swallow eval  · Stat CT Head for change in neuro status. · DVT ppx, SCDs  • PT/OT/PM&R evaluations   • Medical management as per primary team appreciated.

## 2023-09-06 NOTE — PHYSICAL THERAPY NOTE
PHYSICAL THERAPY NOTE          Patient Name: Christina Mcclure  BPYXL'G Date: 9/6/2023 09/06/23 1141   PT Last Visit   PT Visit Date 09/06/23   Note Type   Note Type Treatment   Pain Assessment   Pain Assessment Tool 0-10   Pain Score No Pain   Restrictions/Precautions   Weight Bearing Precautions Per Order No   Other Precautions Cognitive; Chair Alarm; Bed Alarm;Multiple lines;Telemetry; Fall Risk  (expressive aphasia, R sided weakness)   General   Chart Reviewed Yes   Response to Previous Treatment Patient with no complaints from previous session. Family/Caregiver Present Yes  (son)   Cognition   Overall Cognitive Status Impaired   Arousal/Participation Cooperative   Attention Difficulty attending to directions   Orientation Level Oriented to person;Oriented to place;Oriented to situation;Disoriented to time   Following Commands Follows one step commands with increased time or repetition   Comments very pleasant to work with   Subjective   Subjective agreeable   Bed Mobility   Supine to Sit Unable to assess   Sit to Supine Unable to assess   Transfers   Sit to Stand 4  Minimal assistance   Additional items Assist x 1; Increased time required;Verbal cues   Stand to Sit 4  Minimal assistance   Additional items Assist x 1; Increased time required;Verbal cues   Additional Comments c RW vs L HR; x6 STS Throughout   Ambulation/Elevation   Gait pattern Improper Weight shift;Decreased foot clearance;R Foot drag;R Knee Damian;R Hemiparesis; Decreased R stance; Short stride; Step to;Excessively slow; Ataxia   Gait Assistance 3  Moderate assist   Additional items Assist x 2;Verbal cues  (+chair follow of 3rd)   Assistive Device Rolling walker  (vs L HR)   Distance 5' with RW+15'x2 at L HR   Ambulation/Elevation Additional Comments A required at RLE (modAx1) for foot placement and knee block; minAx1 at trunk for upright posture+ chair follow of 3rd person   Balance   Static Sitting Fair   Dynamic Sitting Fair -   Static Standing Poor +   Dynamic Standing Poor   Ambulatory Poor -   Endurance Deficit   Endurance Deficit Yes   Endurance Deficit Description fatigue, R hemiparesis   Activity Tolerance   Activity Tolerance Patient limited by fatigue   Medical Staff Made Aware cathi knight [facility]   Nurse Made Aware yes-cleared   Exercises   Neuro re-ed at L HR with modAx1: lateral WS x8 reps L and R each, fwd backward stepping x5 reps RLE only   Assessment   Prognosis Fair   Problem List Decreased strength;Decreased endurance; Impaired balance;Decreased mobility; Decreased coordination;Decreased cognition; Impaired judgement;Decreased safety awareness; Obesity   Assessment Pt agreeable to participate in PT session. Pt performed functional mobility and therex as outlined above. Upon first ambulation attempt with RW, pt with decreased R foot clearance and inability to keep R foot inside RW. Trial of pregait training of WS and forward/backward stepping with RLE. Pt with difficulty coordinating despite all cues and attempts. Opted for ambulating along L HR with A at trunk and A of 2nd person at RLE for step length, foot placement (pt tends to supinate), and R knee block. Pt with very poor anterior lateral weight shift bilaterally. Will continue to address same in future sessions. Son very happy with gait quality at HR. Pt left seated in chair with chair alarm, call bell, phone, and all personal needs within reach. Pt will continue to benefit from skilled acute care PT to further address their functional mobility limitations. D/C recommendations remain rehab. Barriers to Discharge Inaccessible home environment;Decreased caregiver support   Goals   Patient Goals to improve   STG Expiration Date 09/18/23   PT Treatment Day 2   Plan   Treatment/Interventions Functional transfer training;LE strengthening/ROM; Therapeutic exercise; Endurance training;Patient/family training;Cognitive reorientation; Bed mobility; Equipment eval/education;Gait training;Spoke to nursing;Spoke to case management;OT;Family;ST   Progress Progressing toward goals   PT Frequency 3-5x/wk   Recommendation   PT Discharge Recommendation Post acute rehabilitation services  (return to [facility])   [address]   Turning in Flat Bed Without Bedrails 3   Lying on Back to Sitting on Edge of Flat Bed Without Bedrails 2   Moving Bed to Chair 3   Standing Up From Chair Using Arms 2   Walk in Room 2   Climb 3-5 Stairs With Railing 1   Basic Mobility Inpatient Raw Score 13   Basic Mobility Standardized Score 33.99   Highest Level Of Mobility   JH-HLM Goal 4: Move to chair/commode   JH-HLM Achieved 7: Walk 25 feet or more     Marcos Sanchez, PT, DPT

## 2023-09-06 NOTE — CONSULTS
[address] [place] [address]  70 y.o. female MRN: [id number]  Unit/Bed#: [facility] 710-01 Encounter: [id number]    ASSESSMENT and PLAN:  Dariana Medrano is a 70 y.o. female who was admitted to [facility] after presenting with right-sided weakness. A renal consultation is requested today for assistance in the management of RICARDO on CKD.     # Acute kidney injury  - Baseline creatinine appears to be around 1.5-1.8  - Creatinine on admission 1.93, creatinine today 2.26  - Etiology of RICARDO most likely ATN due to contrast associated nephropathy for contrast administered 09/03/2023  - Check urinalysis with microscopy  - Check kidney ultrasound to rule out obstructive etiology  - Hold losartan  - Patient currently has relative hypotension and is receiving IV fluid bolus per neurology  - After IV fluid bolus, start gentle IVF with Isolyte 50 cc/h  - Extensive discussion with patient with her son present at bedside regarding course of RICARDO and need for another contrast study with vascular surgery  - Patient is very clear and would not want dialysis in case she develops worsening RICARDO  - We will discuss more regarding risk for RICARDO and mitigation of that risk on following visits  - Continue IV fluids as above and trend BMP    # Chronic kidney disease stage IV  - Follows with Dr. Zelda Porras  - Baseline creatinine appears to be around 1.5-1.8  - Patient has previously wished to not pursue dialysis in future if needed    # Hypertension  - Home medications include amlodipine 10 mg daily, clonidine 0.3 mg patch, labetalol 300 mg twice daily, losartan 100 mg daily  - Current medications amlodipine 5 mg daily, clonidine 0.3 mg patch, labetalol 300 mg twice daily and losartan 100 mg daily  - Discontinue losartan in setting of RICARDO  - Use hold parameters for other antihypertensive medications  - If more blood pressure control needed, increase amlodipine to 10 mg daily or add Cardura 2 mg at bedtime  - She currently has relative hypotension and receiving IV fluid bolus and this will be followed by gentle hydration    # Mild hyponatremia  - This is most likely in setting of acute kidney injury  - Monitor serum sodium with IV fluids    # Multifocal infarctions predominantly in left occipital lobe and left frontal and parietal lobes  # Petechial microhemorrhage in left occipital lobe  # Moderate chronic microangiopathy  # Bilateral carotid artery stenosis in setting of left CVA  - Vascular surgery has evaluated the patient and recommending left carotid endarterectomy    Discussed with internal medicine team.  After discussion, we agreed with holding losartan and giving IV fluids as above. Discussed with neurology team.  After discussion, we agreed with IV fluid bolus followed by gentle hydration. HISTORY OF PRESENT ILLNESS:  Requesting Physician: Moe Prater DO  Reason for Consult: RICARDO on CKD    Juice Mcclellan is a 70 y.o. female who was admitted to [address] [place] [address] after presenting with right-sided weakness. A renal consultation is requested today for assistance in the management of RICARDO on CKD. CTA on admission showed advanced atherosclerotic disease. MRI brain showed multifocal infarcts of left occipital, left frontal and left parietal lobes. She has history of chronic kidney disease stage IV and follows with Dr. Viktor Ireland. She currently feels well. She denies dyspnea. She denies leg swelling.     PAST MEDICAL HISTORY:  Past Medical History:   Diagnosis Date   • Arthritis    • Chronic kidney disease    • Diabetes mellitus ([address])    • Endometriosis    • High cholesterol    • Hypertension    • Kidney disease, chronic, stage III (moderate, EGFR 30-59 ml/min) ([facility])    • Lumbar disc herniation    • Neuropathy    • Peripheral vascular disease ([address])    • Pneumonia    • Shoulder injury     left   • Spinal stenosis    • Stroke ([address]) 2015    Memory loss       PAST SURGICAL HISTORY:  Past Surgical History:   Procedure Laterality Date   • CARDIAC CATHETERIZATION     • COLONOSCOPY     • FL RETROGRADE PYELOGRAM  4/6/2020   • FRACTURE SURGERY Right     ankle   • OVARIAN CYST SURGERY     • SC CYSTO BLADDER W/URETERAL CATHETERIZATION Bilateral 4/6/2020    Procedure: CYSTOSCOPY WITH RETROGRADE PYELOGRAM;  Surgeon: Anisa Alejandro MD;  [facility];  Service: Urology   • SC CYSTO W/INSERT URETERAL STENT Right 4/6/2020    Procedure: INSERTION STENT URETERAL;  Surgeon: Anisa Alejandro MD;  [facility];  Service: Urology   • SC CYSTO W/REMOVAL OF TUMORS SMALL N/A 4/6/2020    Procedure: TRANSURETHRAL RESECTION OF BLADDER TUMOR (TURBT);   Surgeon: Anisa Alejandro MD;  [facility];  Service: Urology   • ROTATOR CUFF REPAIR Left    • TONSILLECTOMY         ALLERGIES:  Allergies   Allergen Reactions   • Fenofibrate Other (See Comments)      blood in urine  hx  Kidney Failure   • Colesevelam Other (See Comments)      leg pains   • Colestipol Itching and Other (See Comments)      Swelling lower legs   • Ezetimibe GI Intolerance   • Statins Myalgia       SOCIAL HISTORY:  Social History     Substance and Sexual Activity   Alcohol Use Never     Social History     Substance and Sexual Activity   Drug Use Never     Social History     Tobacco Use   Smoking Status Former   • Packs/day: 2.00   • Years: 40.00   • Total pack years: 80.00   • Types: Cigarettes   • Start date: 1966   • Quit date: 7/27/2020   • Years since quitting: 3.1   Smokeless Tobacco Never       FAMILY HISTORY:  Family History   Problem Relation Age of Onset   • Hypertension Mother    • Heart disease Mother         Valvular   • Hyperlipidemia Mother    • Hypertension Father    • Heart defect Father         Cardiomegaly   • Stroke Sister         Cerebrovascular Accident   • Arthritis Brother    • Other Brother         Back Disorder       MEDICATIONS:    Current Facility-Administered Medications:   •  acetaminophen (TYLENOL) tablet 650 mg, 650 mg, Oral, Q6H PRN, Fiordaliza Rivas PA-C, 650 mg at 09/05/23 9215  •  allopurinol (ZYLOPRIM) tablet 100 mg, 100 mg, Oral, Daily, Tsering Castro MD, 100 mg at 09/06/23 0856  •  aluminum-magnesium hydroxide-simethicone (MAALOX) oral suspension 30 mL, 30 mL, Oral, Q6H PRN, Tsering Castro MD  •  amLODIPine (NORVASC) tablet 5 mg, 5 mg, Oral, Daily, Rupa Alfonso DO, 5 mg at 09/06/23 9168  •  aspirin chewable tablet 81 mg, 81 mg, Oral, Daily, Elder Johnson DO, 81 mg at 09/06/23 0856  •  cinacalcet (SENSIPAR) tablet 30 mg, 30 mg, Oral, Every Other Day, Tsering Castro MD, 30 mg at 09/06/23 0855  •  [START ON 9/9/2023] cloNIDine (CATAPRES-TTS-3) 0.3 mg/24 hr TD weekly patch, 1 patch, Transdermal, Weekly, Tsering Castro MD  •  escitalopram (LEXAPRO) tablet 10 mg, 10 mg, Oral, Daily, Elder Johnson DO, 10 mg at 09/06/23 0856  •  heparin (porcine) subcutaneous injection 5,000 Units, 5,000 Units, Subcutaneous, Q8H [facility], 5,000 Units at 09/05/23 2256 **AND** [COMPLETED] Platelet count, , , Once, Tsering Castro MD  •  insulin glargine (LANTUS) subcutaneous injection 10 Units 0.1 mL, 10 Units, Subcutaneous, HS, Rupa Alfonso DO, 10 Units at 09/05/23 2321  •  insulin lispro (HumaLOG) 100 units/mL subcutaneous injection 3 Units, 3 Units, Subcutaneous, TID With Meals, Tsering Castro MD, 3 Units at 09/06/23 0857  •  labetalol (NORMODYNE) tablet 300 mg, 300 mg, Oral, Q12H [facility], Rupa Alfonso DO, 300 mg at 09/06/23 0855  •  lidocaine (LIDODERM) 5 % patch 2 patch, 2 patch, Topical, Daily, Elder Johnson DO, 2 patch at 09/06/23 0856  •  ondansetron (ZOFRAN) injection 4 mg, 4 mg, Intravenous, Q6H PRN, Tsering Castro MD  •  polyethylene glycol (MIRALAX) packet 17 g, 17 g, Oral, Daily, Tsering Castro MD, 17 g at 09/06/23 0856  •  pravastatin (PRAVACHOL) tablet 80 mg, 80 mg, Oral, Daily With Prem Retana DO, 80 mg at 09/05/23 1647  •  ticagrelor (BRILINTA) tablet 90 mg, 90 mg, Oral, Q12H [facility], Mitchel Castanon DO Nik, 90 mg at 09/06/23 2648  •  traZODone (DESYREL) tablet 50 mg, 50 mg, Oral, HS, Yandel Saleh MD, 50 mg at 09/05/23 3191    REVIEW OF SYSTEMS:  Review of Systems   Constitutional: Negative for chills and fever. HENT: Negative for ear pain and sore throat. Eyes: Negative for pain and visual disturbance. Respiratory: Negative for cough and shortness of breath. Cardiovascular: Negative for chest pain and palpitations. Gastrointestinal: Negative for abdominal pain and vomiting. Genitourinary: Negative for dysuria and hematuria. Musculoskeletal: Negative for arthralgias and back pain. Skin: Negative for color change and rash. Neurological: Negative for seizures and syncope. All other systems reviewed and are negative. PHYSICAL EXAM:  Current Weight: Weight - Scale: 71.9 kg (158 lb 6.4 oz)  First Weight: Weight - Scale: 71.9 kg (158 lb 6.4 oz)  Vitals:    09/05/23 1551 09/05/23 2215 09/05/23 2216 09/06/23 0713   BP: (!) 172/81 162/87 162/87 (!) 192/85   Pulse: 71 87 81 92   Resp:  18 18 16   Temp: 98 °F (36.7 °C)      TempSrc:       SpO2: 98% 95% 98% 98%   Weight:       Height:         No intake or output data in the 24 hours ending 09/06/23 1215  Physical Exam  Constitutional:       Appearance: Normal appearance. HENT:      Head: Normocephalic and atraumatic. Mouth/Throat:      Mouth: Mucous membranes are moist.      Pharynx: Oropharynx is clear. Cardiovascular:      Rate and Rhythm: Normal rate and regular rhythm. Pulses: Normal pulses. Heart sounds: Normal heart sounds. Pulmonary:      Effort: Pulmonary effort is normal.      Breath sounds: Normal breath sounds. Abdominal:      General: Bowel sounds are normal.      Palpations: Abdomen is soft. Musculoskeletal:         General: Normal range of motion. Right lower leg: No edema. Left lower leg: No edema. Skin:     General: Skin is warm and dry.    Neurological:      General: No focal deficit present. Mental Status: She is alert and oriented to person, place, and time. Mental status is at baseline. Psychiatric:         Mood and Affect: Mood normal.         Behavior: Behavior normal.         Thought Content: Thought content normal.         Judgment: Judgment normal.       Lab Results:   Results from last 7 days   Lab Units 09/06/23  0511 09/04/23  0528 09/03/23  1804 09/03/23  1314   WBC Thousand/uL 8.27 7.13  --  6.70   HEMOGLOBIN g/dL 12.4 13.2  --  11.7   HEMATOCRIT % 35.6 37.4  --  34.7*   PLATELETS Thousands/uL 282 274 287 267   POTASSIUM mmol/L 4.3 4.1  --  4.2   CHLORIDE mmol/L 102 104  --  100   CO2 mmol/L 23 21  --  24   BUN mg/dL 47* 51*  --  65*   CREATININE mg/dL 2.26* 1.73*  --  1.93*   CALCIUM mg/dL 9.7 9.8  --  8.9     Portions of the record may have been created with voice recognition software. Occasional wrong word or "sound a like" substitutions may have occurred due to the inherent limitations of voice recognition software. Read the chart carefully and recognize, using context, where substitutions have occurred. If you have any questions, please contact the dictating provider.

## 2023-09-06 NOTE — ANESTHESIA PREPROCEDURE EVALUATION
Procedure:  L TCAR (Left: Neck)    Relevant Problems   ANESTHESIA (within normal limits)      CARDIO   (+) Aortoiliac occlusive disease (HCC)   (+) Celiac artery stenosis (HCC) >70% stenosis in the celiac trunk   (+) Embolism and 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)   (+) Femoral artery stenosis, right (HCC) 50-75% stenosis in the common femoral artery.    (+) Hypercholesteremia   (+) Hyperlipidemia associated with type 2 diabetes mellitus (HCC)   (+) Hyperlipidemia, unspecified   (+) Hypertension   (+) Hypertension associated with diabetes ([address])   (+) Hypertensive urgency   (+) Mesenteric artery stenosis (HCC)   (+) Primary hypertension   (+) Right renal artery stenosis (HCC)   (+) Symptomatic carotid artery stenosis, left   (+) Type 2 diabetes mellitus with diabetic peripheral angiopathy without gangrene (HCC)      ENDO   (+) Secondary hyperparathyroidism of renal origin ([address])   (+) Type 2 diabetes mellitus with diabetic nephropathy (HCC)   (+) Type 2 diabetes mellitus with diabetic peripheral angiopathy without gangrene (HCC)   (+) Type 2 diabetes mellitus, without long-term current use of insulin (HCC)      GI/HEPATIC   (+) Chronic GERD      /RENAL   (+) Hypertensive kidney disease with stage 4 chronic kidney disease (HCC)   (+) Stage 3 chronic kidney disease (HCC)   (+) Stage 3b chronic kidney disease (HCC)   (+) Stage 4 chronic kidney disease ([address])      HEMATOLOGY (within normal limits)      MUSCULOSKELETAL   (+) Gout   (+) Lumbosacral spondylosis without myelopathy   (+) Osteoarthritis   (+) Spondylosis of lumbar region without myelopathy or radiculopathy      NEURO/PSYCH   (+) Acute CVA (cerebrovascular accident) ([address])   (+) Aphasia as late effect of cerebrovascular accident (CVA)   (+) Atherosclerosis of native arteries of extremities with intermittent claudication, bilateral legs (HCC)   (+) CVA (cerebral vascular accident) ([address])   (+) Claudication in peripheral vascular disease ([address]) (+) Depression, recurrent ([address])      PULMONARY   (+) Smoking      Respiratory   (+) Pulmonary nodule 7 mm groundglass opacity in the right upper lobe, unchanged since October 2019      Digestive   (+) Gastrointestinal stromal tumor (GIST) ([address])      Other   (+) Aortoiliac stenosis, left (HCC)   (+) Hx-TIA (transient ischemic attack)   (+) Obesity (BMI 30-39.9)   (+) Obesity, morbid (HCC)   (+) Positive cardiac stress test  small, mildly severe, partially reversible myocardial perfusion defect of anterior and inferior wall       MRI Brain 9/5/2023:  1. Scattered multifocal infarctions predominantly in the left occipital lobe as well as in the left frontal and parietal lobes in the watershed territory, slightly increased in size and number from the prior study indicative of trace crescendo   infarctions/progressive infarctions associated with previously present infarctions in these region. 2. Trace T1 high signal in the left occipital lobe is worrisome for petechial microhemorrhage. No significant mass effect. Recommend follow-up repeat head CT in 24 hours to assess for stability. 3. Moderate, chronic microangiopathy. EKG 9/3/2023:  Normal sinus rhythm  Left ventricular hypertrophy with repolarization abnormality  Anteroseptal infarct (cited on or before 04-NOV-2019)  Abnormal ECG  When compared with ECG of 21-AUG-2023 21:45,  Questionable change in initial forces of Septal leads  T wave inversion more evident in Lateral leads    TTE 8/18/2023:  •  Left Ventricle: Left ventricular cavity size is normal. Wall thickness is increased. There is mild to moderate concentric hypertrophy. Systolic function is normal. Although no diagnostic regional wall motion abnormality was identified, this possibility cannot be completely excluded on the basis of this study. Diastolic function is mildly abnormal, consistent with grade I (abnormal) relaxation. •  Mitral Valve: There is moderate annular calcification.  There is mild regurgitation. •  Pericardium: There is a small pericardial effusion anterior to the heart. There is no echocardiographic evidence of tamponade. Nuclear Stress 8/18/2023:  •  Stress ECG: No ST deviation is noted. The ECG was negative for ischemia. •  Perfusion: There are no perfusion defects. •  Stress Function: Left ventricular function post-stress is normal. Stress ejection fraction is 58 %. •  Stress Combined Conclusion: The ECG and SPECT imaging portions of the stress study are concordant with no evidence of stress induced myocardial ischemia. Left ventricular perfusion is normal.    Cardiac Cath 2/2020:  Mid LAD 50-60% stenosis  D2 small vessel with proximal 90% stenosis  Moderate atherosclerosis of LCX  Proximal RCA chronic total occlusion with left to right and right to right collaterals  Systemic hypertension      Lab Results   Component Value Date    WBC 8.27 09/06/2023    HGB 12.4 09/06/2023    HCT 35.6 09/06/2023    MCV 91 09/06/2023     09/06/2023     Lab Results   Component Value Date    SODIUM 134 (L) 09/06/2023    K 4.3 09/06/2023     09/06/2023    CO2 23 09/06/2023    BUN 47 (H) 09/06/2023    CREATININE 2.26 (H) 09/06/2023    GLUC 129 09/06/2023    CALCIUM 9.7 09/06/2023     Lab Results   Component Value Date    INR 0.98 09/03/2023    INR 1.07 01/22/2022    INR 0.96 03/24/2020    PROTIME 13.2 09/03/2023    PROTIME 13.5 01/22/2022    PROTIME 12.7 03/24/2020     Lab Results   Component Value Date    HGBA1C 7.3 (H) 09/03/2023          Physical Exam    Airway    Mallampati score: II  TM Distance: >3 FB  Neck ROM: full     Dental       Cardiovascular  Cardiovascular exam normal    Pulmonary  Pulmonary exam normal     Other Findings        Anesthesia Plan  ASA Score- 4     Anesthesia Type- IV sedation with anesthesia with ASA Monitors. Additional Monitors: arterial line. Airway Plan:           Plan Factors-    Chart reviewed. EKG reviewed. Imaging results reviewed.  Existing labs reviewed. Patient summary reviewed. Induction- intravenous. Postoperative Plan-     Informed Consent- Anesthetic plan and risks discussed with patient. I personally reviewed this patient with the CRNA. Discussed and agreed on the Anesthesia Plan with the CRNA. Juan Colón

## 2023-09-06 NOTE — ASSESSMENT & PLAN NOTE
Patient presents with acute onset right upper and lower extremity weakness while at SNF undergoing physical therapy for recent stroke in mid-August. She was a stroke alert and was evaluated by neurology in the ED. Patient symptomatically improving at time of admission. · Per neurology, recrudescence of previous stroke likely due to orthostasis while working with physical therapy given significant carotid artery and intracranial artery disease  · However brain MRI with scattered multifocal infarctions predominantly in the left occipital lobe as well as in the left frontal and parietal lobes in the watershed territory, slightly increased in size and number from the prior study indicative of trace crescendo infarctions/progressive infarctions associated with previously present infarctions in these region.   · Per neurology suspect acute stroke secondary to symptomatic ICA stenosis and recommending vascular surgery evaluation   · Continue aspirin, Brilinta, pravastatin  · Vascular surgery is following with plan for intervention  · Monitor

## 2023-09-06 NOTE — QUICK NOTE
· Nursing contacted at 12:20pm given concerns for worsening R-sided weakness s/p working with PT. Specifically, 4/5 LUE and LLE but then with concerns of patient dragging her RLE. · Evaluated by Dr. Stephanie Roa and myself. Agree with 4/5 LUE and LLE strength; compared to 5/5 strength on 9/5 evaluation. Could not evaluate during my examination earlier this AM as patient was too emotionally distressed with decreased attention and was not agreeable to following commands. · Noting SBP 130s compared to 190s this morning around 7am.  · Patient placed in Trendelenburg position with rapid improvement of symptom. LUE 5/5 with arm flexion; patient with receptive aphasia and did not participate in further evaluation. Impression: Etiology due to hypotension associated with orthostasis and excessive physical exertion while working with PT in the setting of severe ICA stenosis. Plan:  · Ordered 1L NSS bolus over 1 hour. · SBP <160 but may require further fluid boluses if symptomatic with lower SBPs  · Repeat CTH wo contrast this afternoon to monitor MRI demonstration of petechial hemorrhage.   · Plan for L TCAR on 9/7/23

## 2023-09-06 NOTE — ASSESSMENT & PLAN NOTE
Patient with left carotid artery disease stroke  · CTA head and neck reveals extensive intracranial atherosclerotic disease stenosis extensive disease bilateral carotid arteries  · Evaluated by vascular surgery, the patient is at high risk for hemorrhagic conversion given brain MRI finding of petechial microhemorrhage  · Continue aspirin statin Brilinta  · Awaiting further vascular input,  plan for left TCAR this admission tomorrow ?

## 2023-09-06 NOTE — PROGRESS NOTES
[address]  Progress Note  Name: Dedrick Osborn  MRN: [id number]  Unit/Bed#: [facility]PHP 710-01 I Date of Admission: 9/3/2023   Date of Service: 9/6/2023 I Hospital Day: 3    Assessment/Plan   * Acute CVA (cerebrovascular accident) [facility])  Assessment & Plan  Patient presents with acute onset right upper and lower extremity weakness while at SNF undergoing physical therapy for recent stroke in mid-August. She was a stroke alert and was evaluated by neurology in the ED. Patient symptomatically improving at time of admission. · Per neurology, recrudescence of previous stroke likely due to orthostasis while working with physical therapy given significant carotid artery and intracranial artery disease  · However brain MRI with scattered multifocal infarctions predominantly in the left occipital lobe as well as in the left frontal and parietal lobes in the watershed territory, slightly increased in size and number from the prior study indicative of trace crescendo infarctions/progressive infarctions associated with previously present infarctions in these region. · Per neurology suspect acute stroke secondary to symptomatic ICA stenosis and recommending vascular surgery evaluation   · Continue aspirin, Brilinta, pravastatin  · Vascular surgery is following with plan for intervention  · Monitor    Symptomatic carotid artery stenosis, left  Assessment & Plan  Patient with left carotid artery disease stroke  · CTA head and neck reveals extensive intracranial atherosclerotic disease stenosis extensive disease bilateral carotid arteries  · Evaluated by vascular surgery, the patient is at high risk for hemorrhagic conversion given brain MRI finding of petechial microhemorrhage  · Continue aspirin statin Brilinta  · Awaiting further vascular input,  plan for left TCAR this admission tomorrow ?     Aphasia as late effect of cerebrovascular accident (CVA)  Assessment & Plan  · Supportive cares    Primary hypertension  Assessment & Plan  Restarted on amlodipine, labetalol and losartan  Continue clonidine  Monitor    Aortoiliac occlusive disease (HCC)  Assessment & Plan  · Continue aspirin statin  · Outpatient vascular surgery follow-up    Type 2 diabetes mellitus, without long-term current use of insulin [facility])  Assessment & Plan  Lab Results   Component Value Date    HGBA1C 7.3 (H) 09/03/2023       Recent Labs     09/05/23  1224 09/05/23  1621 09/05/23  2117 09/06/23  0718   POCGLU 169* 78 127 189*       Blood Sugar Average: Last 72 hrs:  (P) [id number]     Uncontrolled  · Hold glipizide while inpatient  · Continue Lantus at bedtime and Humalog with meals  · Monitor Accu-Cheks and adjust as needed   · Avoid hypoglycemia  · Hypoglycemia protocol in place    Depression, recurrent (HCC)  Assessment & Plan  · Continue trazodone  · Started on Lexapro by neurology    Hyperlipidemia, unspecified  Assessment & Plan  History of myalgias to statin  · Patient on pravastatin and tolerating, continue same     Obesity (BMI 30-39. 9)  Assessment & Plan  · Therapeutic diet and lifestyle modification encouraged    Stage 3 chronic kidney disease [facility])  Assessment & Plan  Lab Results   Component Value Date    EGFR 20 09/06/2023    EGFR 28 09/04/2023    EGFR 24 09/03/2023    CREATININE 2.26 (H) 09/06/2023    CREATININE 1.73 (H) 09/04/2023    CREATININE 1.93 (H) 09/03/2023     · Kidney function stable within baseline   · Over mildly elevated creatinine today  · Avoid nephrotoxins and hypotension  · Continue to monitor           VTE Pharmacologic Prophylaxis: VTE Score: 11 High Risk (Score >/= 5) - Pharmacological DVT Prophylaxis Ordered: heparin. Sequential Compression Devices Ordered. Patient Centered Rounds: I performed bedside rounds with nursing staff today.   Discussions with Specialists or Other Care Team Provider: CM, neurology    Education and Discussions with Family / Patient: Updated  (son) via phone. Total Time Spent on Date of Encounter in care of patient: 55 minutes This time was spent on one or more of the following: performing physical exam; counseling and coordination of care; obtaining or reviewing history; documenting in the medical record; reviewing/ordering tests, medications or procedures; communicating with other healthcare professionals and discussing with patient's family/caregivers. Current Length of Stay: 3 day(s)  Current Patient Status: Inpatient   Certification Statement: The patient will continue to require additional inpatient hospital stay due to Management of acute CVA  Discharge Plan: Anticipate discharge in 48 hrs to rehab facility. Code Status: Level 3 - DNAR and DNI    Subjective:   Patient seen and examined   Comfortable sitting in chair   no event overnight  No nausea vomiting or diarrhea      Objective:     Vitals:   Temp (24hrs), Av °F (36.7 °C), Min:98 °F (36.7 °C), Max:98 °F (36.7 °C)    Temp:  [98 °F (36.7 °C)] 98 °F (36.7 °C)  HR:  [71-92] 92  Resp:  [16-18] 16  BP: (162-192)/(81-87) 192/85  SpO2:  [95 %-98 %] 98 %  Body mass index is 33.11 kg/m².      Input and Output Summary (last 24 hours):   No intake or output data in the 24 hours ending 23 1120    Physical Exam:   Physical Exam   Patient is awake alert no acute distress   Baseline expressive aphasia  Lung clear to auscultation bilateral  Heart positive S1-S2 no murmur  Abdomen soft nontender  Lower extremities no edema    Additional Data:     Labs:  Results from last 7 days   Lab Units 23  0511 23  0528   WBC Thousand/uL 8.27 7.13   HEMOGLOBIN g/dL 12.4 13.2   HEMATOCRIT % 35.6 37.4   PLATELETS Thousands/uL 282 274   NEUTROS PCT %  --  60   LYMPHS PCT %  --  21   MONOS PCT %  --  12   EOS PCT %  --  5     Results from last 7 days   Lab Units 23  0511   SODIUM mmol/L 134*   POTASSIUM mmol/L 4.3   CHLORIDE mmol/L 102   CO2 mmol/L 23   BUN mg/dL 47*   CREATININE mg/dL 2.26* ANION GAP mmol/L 9   CALCIUM mg/dL 9.7   GLUCOSE RANDOM mg/dL 129     Results from last 7 days   Lab Units 09/03/23  1314   INR  0.98     Results from last 7 days   Lab Units 09/06/23  0718 09/05/23  2117 09/05/23  1621 09/05/23  1224 09/05/23  0603 09/04/23  2058 09/04/23  1727 09/04/23  1216 09/04/23  0627 09/03/23  2103 09/03/23  1759 09/03/23  1308   POC GLUCOSE mg/dl 189* 127 78 169* 123 99 122 284* 109 119 194* 134     Results from last 7 days   Lab Units 09/03/23  1314   HEMOGLOBIN A1C % 7.3*           Lines/Drains:  Invasive Devices     Peripheral Intravenous Line  Duration           Peripheral IV 09/04/23 Distal;Dorsal (posterior); Right Forearm 2 days                      Imaging: No pertinent imaging reviewed.     Recent Cultures (last 7 days):         Last 24 Hours Medication List:   Current Facility-Administered Medications   Medication Dose Route Frequency Provider Last Rate   • acetaminophen  650 mg Oral Q6H PRN Jaclyn Burden PA-C     • allopurinol  100 mg Oral Daily Mohan Ingram MD     • aluminum-magnesium hydroxide-simethicone  30 mL Oral Q6H PRN Mohan Ingram MD     • amLODIPine  5 mg Oral Daily Moe Prater, DO     • aspirin  81 mg Oral Daily Sharri Roa, DO     • cinacalcet  30 mg Oral Every Other Day Mohan Ingram MD     • [START ON 9/9/2023] cloNIDine  1 patch Transdermal Weekly Mohan Ingram MD     • escitalopram  10 mg Oral Daily Sharri Roa, DO     • heparin (porcine)  5,000 Units Subcutaneous Q8H [facility] Mohan Ingram MD     • insulin glargine  10 Units Subcutaneous HS Moe Prater, DO     • insulin lispro  3 Units Subcutaneous TID With Meals Mohan Ingram MD     • labetalol  300 mg Oral Q12H [facility] Moe Prater DO     • lidocaine  2 patch Topical Daily Sharri Janina, DO     • losartan  100 mg Oral Daily Sharri Janina, DO     • ondansetron  4 mg Intravenous Q6H PRN Mohan Ingram MD     • polyethylene glycol  17 g Oral Daily Kj Buitrago MD     • pravastatin  80 mg Oral Daily With Shalom Davidson DO     • ticagrelor  90 mg Oral Q12H [address] [facility], DO     • traZODone  50 mg Oral HS Kj Buitrago MD          Today, Patient Was Seen By: Marichuy Hair DO    **Please Note: This note may have been constructed using a voice recognition system. **

## 2023-09-06 NOTE — ASSESSMENT & PLAN NOTE
Lab Results   Component Value Date    HGBA1C 7.3 (H) 09/03/2023       Recent Labs     09/05/23  1224 09/05/23  1621 09/05/23  2117 09/06/23  0718   POCGLU 169* 78 127 189*       Blood Sugar Average: Last 72 hrs:  (P) [id number]     Uncontrolled  · Hold glipizide while inpatient  · Continue Lantus at bedtime and Humalog with meals  · Monitor Accu-Cheks and adjust as needed   · Avoid hypoglycemia  · Hypoglycemia protocol in place

## 2023-09-06 NOTE — SPEECH THERAPY NOTE
Speech Language/Pathology  Speech-Language Pathology Progress Note      Patient Name: Kaylee HERNANDEZ Date: 9/6/2023      Subjective:  Pt was awake and alert. She was sitting up in chair at bedside. Objective:  Pt was seen today for speech/lang therapy. Completed the WAB bedside, the pt scored as global aphasia. She has improved completion of simple command tasks, and the use of a carrier phrase assists in work recall. Informally assessed automatic speech- 8/10 numbers w/ some repetition. Attempted melodic intonation w/ 3 songs- increased approximations of words within for 70%  Pt then completed phrase completion-7/10 with max cues an needed carrier phrases to complete. Unable to write her name  Able to read 5/5 single words correctly in isolation. She was unable to read a paragraph or three words combined. Assessment:  Pt w/ ongoing word finding and sentence creation deficits. Able to recall words w/ carrier phrase and some phonemic cues. Improved output with melodic intonation for simple songs. Plan:  Continue with ongoing speech services as able. Will benefit from rehab post hospital stay.

## 2023-09-06 NOTE — PLAN OF CARE
Problem: MOBILITY - ADULT  Goal: Maintain or return to baseline ADL function  Description: INTERVENTIONS:  -  Assess patient's ability to carry out ADLs; assess patient's baseline for ADL function and identify physical deficits which impact ability to perform ADLs (bathing, care of mouth/teeth, toileting, grooming, dressing, etc.)  - Assess/evaluate cause of self-care deficits   - Assess range of motion  - Assess patient's mobility; develop plan if impaired  - Assess patient's need for assistive devices and provide as appropriate  - Encourage maximum independence but intervene and supervise when necessary  - Involve family in performance of ADLs  - Assess for home care needs following discharge   - Consider OT consult to assist with ADL evaluation and planning for discharge  - Provide patient education as appropriate  Outcome: Progressing  Goal: Maintains/Returns to pre admission functional level  Description: INTERVENTIONS:  - Perform BMAT or MOVE assessment daily.   - Set and communicate daily mobility goal to care team and patient/family/caregiver.    - Collaborate with rehabilitation services on mobility goals if consulted  - Out of bed for toileting  - Record patient progress and toleration of activity level   Outcome: Progressing     Problem: PAIN - ADULT  Goal: Verbalizes/displays adequate comfort level or baseline comfort level  Description: Interventions:  - Encourage patient to monitor pain and request assistance  - Assess pain using appropriate pain scale  - Administer analgesics based on type and severity of pain and evaluate response  - Implement non-pharmacological measures as appropriate and evaluate response  - Consider cultural and social influences on pain and pain management  - Notify physician/advanced practitioner if interventions unsuccessful or patient reports new pain  Outcome: Progressing     Problem: INFECTION - ADULT  Goal: Absence or prevention of progression during hospitalization  Description: INTERVENTIONS:  - Assess and monitor for signs and symptoms of infection  - Monitor lab/diagnostic results  - Monitor all insertion sites, i.e. indwelling lines, tubes, and drains  - Monitor endotracheal if appropriate and nasal secretions for changes in amount and color  - [place] appropriate cooling/warming therapies per order  - Administer medications as ordered  - Instruct and encourage patient and family to use good hand hygiene technique  - Identify and instruct in appropriate isolation precautions for identified infection/condition  Outcome: Progressing  Goal: Absence of fever/infection during neutropenic period  Description: INTERVENTIONS:  - Monitor WBC    Outcome: Progressing     Problem: SAFETY ADULT  Goal: Maintain or return to baseline ADL function  Description: INTERVENTIONS:  -  Assess patient's ability to carry out ADLs; assess patient's baseline for ADL function and identify physical deficits which impact ability to perform ADLs (bathing, care of mouth/teeth, toileting, grooming, dressing, etc.)  - Assess/evaluate cause of self-care deficits   - Assess range of motion  - Assess patient's mobility; develop plan if impaired  - Assess patient's need for assistive devices and provide as appropriate  - Encourage maximum independence but intervene and supervise when necessary  - Involve family in performance of ADLs  - Assess for home care needs following discharge   - Consider OT consult to assist with ADL evaluation and planning for discharge  - Provide patient education as appropriate  Outcome: Progressing  Goal: Maintains/Returns to pre admission functional level  Description: INTERVENTIONS:  - Perform BMAT or MOVE assessment daily.   - Set and communicate daily mobility goal to care team and patient/family/caregiver.    - Collaborate with rehabilitation services on mobility goals if consulted  - Ambulate patient 3 times a day    - Out of bed for toileting  - Record patient progress and toleration of activity level   Outcome: Progressing  Goal: Patient will remain free of falls  Description: INTERVENTIONS:  - Educate patient/family on patient safety including physical limitations  - Instruct patient to call for assistance with activity   - Consult OT/PT to assist with strengthening/mobility   - Keep Call bell within reach  - Keep bed low and locked with side rails adjusted as appropriate  - Keep care items and personal belongings within reach  - Initiate and maintain comfort rounds  - Make Fall Risk Sign visible to staff  - Offer Toileting every 2 Hours, in advance of need  - Initiate/Maintain bed alarm  - Obtain necessary fall risk management equipment:   - Apply yellow socks and bracelet for high fall risk patients  - Consider moving patient to room near nurses station  Outcome: Progressing     Problem: DISCHARGE PLANNING  Goal: Discharge to home or other facility with appropriate resources  Description: INTERVENTIONS:  - Identify barriers to discharge w/patient and caregiver  - Arrange for needed discharge resources and transportation as appropriate  - Identify discharge learning needs (meds, wound care, etc.)  - Arrange for interpretive services to assist at discharge as needed  - Refer to Case Management Department for coordinating discharge planning if the patient needs post-hospital services based on physician/advanced practitioner order or complex needs related to functional status, cognitive ability, or social support system  Outcome: Progressing     Problem: Knowledge Deficit  Goal: Patient/family/caregiver demonstrates understanding of disease process, treatment plan, medications, and discharge instructions  Description: Complete learning assessment and assess knowledge base.   Interventions:  - Provide teaching at level of understanding  - Provide teaching via preferred learning methods  Outcome: Progressing     Problem: Prexisting or High Potential for Compromised Skin Integrity  Goal: Skin integrity is maintained or improved  Description: INTERVENTIONS:  - Identify patients at risk for skin breakdown  - Assess and monitor skin integrity  - Assess and monitor nutrition and hydration status  - Monitor labs   - Assess for incontinence   - Turn and reposition patient  - Assist with mobility/ambulation  - Relieve pressure over bony prominences  - Avoid friction and shearing  - Provide appropriate hygiene as needed including keeping skin clean and dry  - Evaluate need for skin moisturizer/barrier cream  - Collaborate with interdisciplinary team   - Patient/family teaching  - Consider wound care consult   Outcome: Progressing     Problem: NEUROSENSORY - ADULT  Goal: Achieves stable or improved neurological status  Description: INTERVENTIONS  - Monitor and report changes in neurological status  - Monitor vital signs such as temperature, blood pressure, glucose, and any other labs ordered   - Initiate measures to prevent increased intracranial pressure  - Monitor for seizure activity and implement precautions if appropriate      Outcome: Progressing     Problem: CARDIOVASCULAR - ADULT  Goal: Maintains optimal cardiac output and hemodynamic stability  Description: INTERVENTIONS:  - Monitor I/O, vital signs and rhythm  - Monitor for S/S and trends of decreased cardiac output  - Administer and titrate ordered vasoactive medications to optimize hemodynamic stability  - Assess quality of pulses, skin color and temperature  - Assess for signs of decreased coronary artery perfusion  - Instruct patient to report change in severity of symptoms  Outcome: Progressing     Problem: Nutrition/Hydration-ADULT  Goal: Nutrient/Hydration intake appropriate for improving, restoring or maintaining nutritional needs  Description: Monitor and assess patient's nutrition/hydration status for malnutrition. Collaborate with interdisciplinary team and initiate plan and interventions as ordered.   Monitor patient's weight and dietary intake as ordered or per policy. Utilize nutrition screening tool and intervene as necessary. Determine patient's food preferences and provide high-protein, high-caloric foods as appropriate.      INTERVENTIONS:  - Monitor oral intake, urinary output, labs, and treatment plans  - Assess nutrition and hydration status and recommend course of action  - Evaluate amount of meals eaten  - Assist patient with eating if necessary   - Allow adequate time for meals  - Recommend/ encourage appropriate diets, oral nutritional supplements, and vitamin/mineral supplements  - Order, calculate, and assess calorie counts as needed  - Recommend, monitor, and adjust tube feedings and TPN/PPN based on assessed needs  - Assess need for intravenous fluids  - Provide specific nutrition/hydration education as appropriate  - Include patient/family/caregiver in decisions related to nutrition  Outcome: Progressing

## 2023-09-07 ENCOUNTER — APPOINTMENT (INPATIENT)
Dept: RADIOLOGY | Facility: HOSPITAL | Age: 78
DRG: 034 | End: 2023-09-07
Payer: COMMERCIAL

## 2023-09-07 ENCOUNTER — ANESTHESIA (INPATIENT)
Dept: PERIOP | Facility: HOSPITAL | Age: 78
DRG: 034 | End: 2023-09-07
Payer: COMMERCIAL

## 2023-09-07 PROBLEM — F17.200 SMOKING: Status: ACTIVE | Noted: 2023-09-07

## 2023-09-07 PROBLEM — N28.1 RENAL CYST: Status: ACTIVE | Noted: 2023-09-07

## 2023-09-07 LAB
ANION GAP SERPL CALCULATED.3IONS-SCNC: 16 MMOL/L
BASE EXCESS BLDA CALC-SCNC: -5 MMOL/L (ref -2–3)
BUN SERPL-MCNC: 34 MG/DL (ref 5–25)
CA-I BLD-SCNC: 1.14 MMOL/L (ref 1.12–1.32)
CALCIUM SERPL-MCNC: 6.9 MG/DL (ref 8.4–10.2)
CHLORIDE SERPL-SCNC: 103 MMOL/L (ref 96–108)
CO2 SERPL-SCNC: 19 MMOL/L (ref 21–32)
CREAT SERPL-MCNC: 1.67 MG/DL (ref 0.6–1.3)
GFR SERPL CREATININE-BSD FRML MDRD: 29 ML/MIN/1.73SQ M
GLUCOSE SERPL-MCNC: 102 MG/DL (ref 65–140)
GLUCOSE SERPL-MCNC: 111 MG/DL (ref 65–140)
GLUCOSE SERPL-MCNC: 118 MG/DL (ref 65–140)
GLUCOSE SERPL-MCNC: 130 MG/DL (ref 65–140)
GLUCOSE SERPL-MCNC: 148 MG/DL (ref 65–140)
GLUCOSE SERPL-MCNC: 82 MG/DL (ref 65–140)
HCO3 BLDA-SCNC: 21.1 MMOL/L (ref 22–28)
HCT VFR BLD CALC: 41 % (ref 34.8–46.1)
HGB BLDA-MCNC: 13.9 G/DL (ref 11.5–15.4)
KCT BLD-ACNC: 266 SEC (ref 89–137)
PCO2 BLD: 22 MMOL/L (ref 21–32)
PCO2 BLD: 41.5 MM HG (ref 36–44)
PH BLD: 7.31 [PH] (ref 7.35–7.45)
PO2 BLD: 369 MM HG (ref 75–129)
POTASSIUM BLD-SCNC: 4.1 MMOL/L (ref 3.5–5.3)
POTASSIUM SERPL-SCNC: 4.3 MMOL/L (ref 3.5–5.3)
SAO2 % BLD FROM PO2: 100 % (ref 60–85)
SODIUM BLD-SCNC: 140 MMOL/L (ref 136–145)
SODIUM SERPL-SCNC: 138 MMOL/L (ref 135–147)
SPECIMEN SOURCE: ABNORMAL
SPECIMEN SOURCE: ABNORMAL

## 2023-09-07 PROCEDURE — 84132 ASSAY OF SERUM POTASSIUM: CPT

## 2023-09-07 PROCEDURE — C1876 STENT, NON-COA/NON-COV W/DEL: HCPCS | Performed by: SURGERY

## 2023-09-07 PROCEDURE — C1769 GUIDE WIRE: HCPCS | Performed by: SURGERY

## 2023-09-07 PROCEDURE — 99233 SBSQ HOSP IP/OBS HIGH 50: CPT | Performed by: INTERNAL MEDICINE

## 2023-09-07 PROCEDURE — 99232 SBSQ HOSP IP/OBS MODERATE 35: CPT | Performed by: PSYCHIATRY & NEUROLOGY

## 2023-09-07 PROCEDURE — 82948 REAGENT STRIP/BLOOD GLUCOSE: CPT

## 2023-09-07 PROCEDURE — 85347 COAGULATION TIME ACTIVATED: CPT

## 2023-09-07 PROCEDURE — 99232 SBSQ HOSP IP/OBS MODERATE 35: CPT | Performed by: INTERNAL MEDICINE

## 2023-09-07 PROCEDURE — X2AJ336 CEREBRAL EMBOLIC FILTRATION, EXTRACORPOREAL FLOW REVERSAL CIRCUIT FROM LEFT COMMON CAROTID ARTERY, PERCUTANEOUS APPROACH, NEW TECHNOLOGY GROUP 6: ICD-10-PCS | Performed by: SURGERY

## 2023-09-07 PROCEDURE — 76937 US GUIDE VASCULAR ACCESS: CPT

## 2023-09-07 PROCEDURE — 82330 ASSAY OF CALCIUM: CPT

## 2023-09-07 PROCEDURE — 82803 BLOOD GASES ANY COMBINATION: CPT

## 2023-09-07 PROCEDURE — 85014 HEMATOCRIT: CPT

## 2023-09-07 PROCEDURE — 37215 TRANSCATH STENT CCA W/EPS: CPT | Performed by: SURGERY

## 2023-09-07 PROCEDURE — 82947 ASSAY GLUCOSE BLOOD QUANT: CPT

## 2023-09-07 PROCEDURE — 037L3DZ DILATION OF LEFT INTERNAL CAROTID ARTERY WITH INTRALUMINAL DEVICE, PERCUTANEOUS APPROACH: ICD-10-PCS | Performed by: SURGERY

## 2023-09-07 PROCEDURE — 99024 POSTOP FOLLOW-UP VISIT: CPT | Performed by: SURGERY

## 2023-09-07 PROCEDURE — 80048 BASIC METABOLIC PNL TOTAL CA: CPT | Performed by: INTERNAL MEDICINE

## 2023-09-07 PROCEDURE — C1725 CATH, TRANSLUMIN NON-LASER: HCPCS | Performed by: SURGERY

## 2023-09-07 PROCEDURE — 84295 ASSAY OF SERUM SODIUM: CPT

## 2023-09-07 PROCEDURE — 97535 SELF CARE MNGMENT TRAINING: CPT

## 2023-09-07 PROCEDURE — C1894 INTRO/SHEATH, NON-LASER: HCPCS | Performed by: SURGERY

## 2023-09-07 DEVICE — 10 MM X 40 MM
Type: IMPLANTABLE DEVICE | Site: CAROTID | Status: FUNCTIONAL
Brand: ENROUTE TRANSCAROTID STENT

## 2023-09-07 RX ORDER — PHENYLEPHRINE HCL IN 0.9% NACL 1 MG/10 ML
SYRINGE (ML) INTRAVENOUS AS NEEDED
Status: DISCONTINUED | OUTPATIENT
Start: 2023-09-07 | End: 2023-09-07

## 2023-09-07 RX ORDER — HYDRALAZINE HYDROCHLORIDE 20 MG/ML
15 INJECTION INTRAMUSCULAR; INTRAVENOUS
Status: DISCONTINUED | OUTPATIENT
Start: 2023-09-07 | End: 2023-09-08

## 2023-09-07 RX ORDER — ROCURONIUM BROMIDE 10 MG/ML
INJECTION, SOLUTION INTRAVENOUS AS NEEDED
Status: DISCONTINUED | OUTPATIENT
Start: 2023-09-07 | End: 2023-09-07

## 2023-09-07 RX ORDER — AMLODIPINE BESYLATE 5 MG/1
5 TABLET ORAL DAILY
Status: DISCONTINUED | OUTPATIENT
Start: 2023-09-08 | End: 2023-09-09 | Stop reason: HOSPADM

## 2023-09-07 RX ORDER — PROTAMINE SULFATE 10 MG/ML
INJECTION, SOLUTION INTRAVENOUS AS NEEDED
Status: DISCONTINUED | OUTPATIENT
Start: 2023-09-07 | End: 2023-09-07

## 2023-09-07 RX ORDER — CLONIDINE 0.3 MG/24H
1 PATCH, EXTENDED RELEASE TRANSDERMAL WEEKLY
Status: DISCONTINUED | OUTPATIENT
Start: 2023-09-09 | End: 2023-09-09 | Stop reason: HOSPADM

## 2023-09-07 RX ORDER — LIDOCAINE HYDROCHLORIDE 20 MG/ML
INJECTION, SOLUTION EPIDURAL; INFILTRATION; INTRACAUDAL; PERINEURAL AS NEEDED
Status: DISCONTINUED | OUTPATIENT
Start: 2023-09-07 | End: 2023-09-07

## 2023-09-07 RX ORDER — SODIUM CHLORIDE, SODIUM GLUCONATE, SODIUM ACETATE, POTASSIUM CHLORIDE, MAGNESIUM CHLORIDE, SODIUM PHOSPHATE, DIBASIC, AND POTASSIUM PHOSPHATE .53; .5; .37; .037; .03; .012; .00082 G/100ML; G/100ML; G/100ML; G/100ML; G/100ML; G/100ML; G/100ML
50 INJECTION, SOLUTION INTRAVENOUS CONTINUOUS
Status: DISCONTINUED | OUTPATIENT
Start: 2023-09-07 | End: 2023-09-07

## 2023-09-07 RX ORDER — LABETALOL HYDROCHLORIDE 5 MG/ML
INJECTION, SOLUTION INTRAVENOUS AS NEEDED
Status: DISCONTINUED | OUTPATIENT
Start: 2023-09-07 | End: 2023-09-07

## 2023-09-07 RX ORDER — PROPOFOL 10 MG/ML
INJECTION, EMULSION INTRAVENOUS AS NEEDED
Status: DISCONTINUED | OUTPATIENT
Start: 2023-09-07 | End: 2023-09-07

## 2023-09-07 RX ORDER — GLYCOPYRROLATE 0.2 MG/ML
INJECTION INTRAMUSCULAR; INTRAVENOUS AS NEEDED
Status: DISCONTINUED | OUTPATIENT
Start: 2023-09-07 | End: 2023-09-07

## 2023-09-07 RX ORDER — ACETAMINOPHEN 325 MG/1
975 TABLET ORAL EVERY 8 HOURS SCHEDULED
Status: DISCONTINUED | OUTPATIENT
Start: 2023-09-07 | End: 2023-09-09 | Stop reason: HOSPADM

## 2023-09-07 RX ORDER — SODIUM CHLORIDE, SODIUM LACTATE, POTASSIUM CHLORIDE, CALCIUM CHLORIDE 600; 310; 30; 20 MG/100ML; MG/100ML; MG/100ML; MG/100ML
75 INJECTION, SOLUTION INTRAVENOUS CONTINUOUS
Status: DISCONTINUED | OUTPATIENT
Start: 2023-09-07 | End: 2023-09-08

## 2023-09-07 RX ORDER — ONDANSETRON 2 MG/ML
INJECTION INTRAMUSCULAR; INTRAVENOUS AS NEEDED
Status: DISCONTINUED | OUTPATIENT
Start: 2023-09-07 | End: 2023-09-07

## 2023-09-07 RX ORDER — OXYCODONE HYDROCHLORIDE 5 MG/1
5 TABLET ORAL EVERY 8 HOURS PRN
Status: DISCONTINUED | OUTPATIENT
Start: 2023-09-07 | End: 2023-09-09 | Stop reason: HOSPADM

## 2023-09-07 RX ORDER — HEPARIN SODIUM 1000 [USP'U]/ML
INJECTION, SOLUTION INTRAVENOUS; SUBCUTANEOUS AS NEEDED
Status: DISCONTINUED | OUTPATIENT
Start: 2023-09-07 | End: 2023-09-07

## 2023-09-07 RX ORDER — FENTANYL CITRATE 50 UG/ML
INJECTION, SOLUTION INTRAMUSCULAR; INTRAVENOUS AS NEEDED
Status: DISCONTINUED | OUTPATIENT
Start: 2023-09-07 | End: 2023-09-07

## 2023-09-07 RX ORDER — SODIUM CHLORIDE 9 MG/ML
INJECTION, SOLUTION INTRAVENOUS CONTINUOUS PRN
Status: DISCONTINUED | OUTPATIENT
Start: 2023-09-07 | End: 2023-09-07

## 2023-09-07 RX ORDER — CEFAZOLIN SODIUM 2 G/50ML
SOLUTION INTRAVENOUS AS NEEDED
Status: DISCONTINUED | OUTPATIENT
Start: 2023-09-07 | End: 2023-09-07

## 2023-09-07 RX ORDER — LABETALOL HYDROCHLORIDE 5 MG/ML
5 INJECTION, SOLUTION INTRAVENOUS
Status: COMPLETED | OUTPATIENT
Start: 2023-09-07 | End: 2023-09-07

## 2023-09-07 RX ORDER — FENTANYL CITRATE/PF 50 MCG/ML
25 SYRINGE (ML) INJECTION
Status: DISCONTINUED | OUTPATIENT
Start: 2023-09-07 | End: 2023-09-07 | Stop reason: HOSPADM

## 2023-09-07 RX ORDER — SENNOSIDES 8.6 MG
1 TABLET ORAL DAILY
Status: DISCONTINUED | OUTPATIENT
Start: 2023-09-07 | End: 2023-09-09 | Stop reason: HOSPADM

## 2023-09-07 RX ORDER — IODIXANOL 320 MG/ML
INJECTION, SOLUTION INTRAVASCULAR AS NEEDED
Status: DISCONTINUED | OUTPATIENT
Start: 2023-09-07 | End: 2023-09-07 | Stop reason: HOSPADM

## 2023-09-07 RX ORDER — HEPARIN SODIUM 200 [USP'U]/100ML
INJECTION, SOLUTION INTRAVENOUS
Status: COMPLETED | OUTPATIENT
Start: 2023-09-07 | End: 2023-09-07

## 2023-09-07 RX ADMIN — AMLODIPINE BESYLATE 5 MG: 5 TABLET ORAL at 09:04

## 2023-09-07 RX ADMIN — TICAGRELOR 90 MG: 90 TABLET ORAL at 20:33

## 2023-09-07 RX ADMIN — ACETAMINOPHEN 975 MG: 325 TABLET, FILM COATED ORAL at 23:13

## 2023-09-07 RX ADMIN — CEFAZOLIN SODIUM 2000 MG: 2 SOLUTION INTRAVENOUS at 13:35

## 2023-09-07 RX ADMIN — ROCURONIUM BROMIDE 50 MG: 10 INJECTION, SOLUTION INTRAVENOUS at 13:23

## 2023-09-07 RX ADMIN — ROCURONIUM BROMIDE 20 MG: 10 INJECTION, SOLUTION INTRAVENOUS at 14:24

## 2023-09-07 RX ADMIN — NICARDIPINE HYDROCHLORIDE 200 MCG: 2.5 INJECTION, SOLUTION INTRAVENOUS at 15:28

## 2023-09-07 RX ADMIN — LABETALOL HYDROCHLORIDE 300 MG: 200 TABLET, FILM COATED ORAL at 09:04

## 2023-09-07 RX ADMIN — FENTANYL CITRATE 50 MCG: 50 INJECTION, SOLUTION INTRAMUSCULAR; INTRAVENOUS at 13:13

## 2023-09-07 RX ADMIN — SUGAMMADEX 50 MG: 100 INJECTION, SOLUTION INTRAVENOUS at 14:51

## 2023-09-07 RX ADMIN — SODIUM CHLORIDE: 0.9 INJECTION, SOLUTION INTRAVENOUS at 12:54

## 2023-09-07 RX ADMIN — LABETALOL HYDROCHLORIDE 5 MG: 5 INJECTION, SOLUTION INTRAVENOUS at 13:15

## 2023-09-07 RX ADMIN — PROPOFOL 30 MG: 10 INJECTION, EMULSION INTRAVENOUS at 14:57

## 2023-09-07 RX ADMIN — TRAZODONE HYDROCHLORIDE 50 MG: 50 TABLET ORAL at 23:13

## 2023-09-07 RX ADMIN — GLYCOPYRROLATE 0.2 MG: 0.2 INJECTION, SOLUTION INTRAMUSCULAR; INTRAVENOUS at 13:58

## 2023-09-07 RX ADMIN — LIDOCAINE HYDROCHLORIDE 40 MG: 20 INJECTION, SOLUTION EPIDURAL; INFILTRATION; INTRACAUDAL; PERINEURAL at 13:23

## 2023-09-07 RX ADMIN — PROPOFOL 120 MG: 10 INJECTION, EMULSION INTRAVENOUS at 13:23

## 2023-09-07 RX ADMIN — LABETALOL HYDROCHLORIDE 5 MG: 5 INJECTION, SOLUTION INTRAVENOUS at 13:16

## 2023-09-07 RX ADMIN — LABETALOL HYDROCHLORIDE 5 MG: 5 INJECTION, SOLUTION INTRAVENOUS at 16:17

## 2023-09-07 RX ADMIN — NICARDIPINE HYDROCHLORIDE 100 MCG: 2.5 INJECTION, SOLUTION INTRAVENOUS at 14:04

## 2023-09-07 RX ADMIN — LABETALOL HYDROCHLORIDE 300 MG: 200 TABLET, FILM COATED ORAL at 20:33

## 2023-09-07 RX ADMIN — NICARDIPINE HYDROCHLORIDE 100 MCG: 2.5 INJECTION, SOLUTION INTRAVENOUS at 14:21

## 2023-09-07 RX ADMIN — Medication 200 MCG: at 13:23

## 2023-09-07 RX ADMIN — FENTANYL CITRATE 50 MCG: 50 INJECTION, SOLUTION INTRAMUSCULAR; INTRAVENOUS at 13:23

## 2023-09-07 RX ADMIN — HYDRALAZINE HYDROCHLORIDE 15 MG: 20 INJECTION, SOLUTION INTRAMUSCULAR; INTRAVENOUS at 16:27

## 2023-09-07 RX ADMIN — ASPIRIN 81 MG CHEWABLE TABLET 81 MG: 81 TABLET CHEWABLE at 09:08

## 2023-09-07 RX ADMIN — SUGAMMADEX 50 MG: 100 INJECTION, SOLUTION INTRAVENOUS at 14:46

## 2023-09-07 RX ADMIN — ONDANSETRON 4 MG: 2 INJECTION INTRAMUSCULAR; INTRAVENOUS at 14:39

## 2023-09-07 RX ADMIN — SODIUM CHLORIDE, SODIUM LACTATE, POTASSIUM CHLORIDE, AND CALCIUM CHLORIDE 75 ML/HR: .6; .31; .03; .02 INJECTION, SOLUTION INTRAVENOUS at 17:24

## 2023-09-07 RX ADMIN — OXYCODONE HYDROCHLORIDE 5 MG: 5 TABLET ORAL at 20:32

## 2023-09-07 RX ADMIN — HEPARIN SODIUM 5000 UNITS: 5000 INJECTION INTRAVENOUS; SUBCUTANEOUS at 23:12

## 2023-09-07 RX ADMIN — SODIUM CHLORIDE: 0.9 INJECTION, SOLUTION INTRAVENOUS at 14:19

## 2023-09-07 RX ADMIN — SODIUM CHLORIDE, SODIUM GLUCONATE, SODIUM ACETATE, POTASSIUM CHLORIDE, MAGNESIUM CHLORIDE, SODIUM PHOSPHATE, DIBASIC, AND POTASSIUM PHOSPHATE 50 ML/HR: .53; .5; .37; .037; .03; .012; .00082 INJECTION, SOLUTION INTRAVENOUS at 09:36

## 2023-09-07 RX ADMIN — ALLOPURINOL 100 MG: 100 TABLET ORAL at 09:05

## 2023-09-07 RX ADMIN — HYDRALAZINE HYDROCHLORIDE 15 MG: 20 INJECTION, SOLUTION INTRAMUSCULAR; INTRAVENOUS at 20:44

## 2023-09-07 RX ADMIN — LIDOCAINE 2 PATCH: 700 PATCH TOPICAL at 09:09

## 2023-09-07 RX ADMIN — ESCITALOPRAM OXALATE 10 MG: 10 TABLET ORAL at 09:05

## 2023-09-07 RX ADMIN — NICARDIPINE HYDROCHLORIDE 200 MCG: 2.5 INJECTION, SOLUTION INTRAVENOUS at 15:03

## 2023-09-07 RX ADMIN — PROTAMINE SULFATE 30 MG: 10 INJECTION, SOLUTION INTRAVENOUS at 14:37

## 2023-09-07 RX ADMIN — INSULIN GLARGINE 10 UNITS: 100 INJECTION, SOLUTION SUBCUTANEOUS at 23:13

## 2023-09-07 RX ADMIN — PHENYLEPHRINE HYDROCHLORIDE 30 MCG/MIN: 10 INJECTION INTRAVENOUS at 13:27

## 2023-09-07 RX ADMIN — HEPARIN SODIUM 6000 UNITS: 1000 INJECTION INTRAVENOUS; SUBCUTANEOUS at 13:58

## 2023-09-07 RX ADMIN — SODIUM CHLORIDE: 9 INJECTION, SOLUTION INTRAVENOUS at 13:30

## 2023-09-07 RX ADMIN — TICAGRELOR 90 MG: 90 TABLET ORAL at 09:10

## 2023-09-07 RX ADMIN — NICARDIPINE HYDROCHLORIDE 100 MCG: 2.5 INJECTION, SOLUTION INTRAVENOUS at 15:24

## 2023-09-07 RX ADMIN — LIDOCAINE HYDROCHLORIDE 60 MG: 20 INJECTION, SOLUTION EPIDURAL; INFILTRATION; INTRACAUDAL; PERINEURAL at 13:16

## 2023-09-07 RX ADMIN — LABETALOL HYDROCHLORIDE 5 MG: 5 INJECTION, SOLUTION INTRAVENOUS at 16:00

## 2023-09-07 RX ADMIN — NICARDIPINE HYDROCHLORIDE 5 MG/HR: 25 INJECTION, SOLUTION INTRAVENOUS at 20:55

## 2023-09-07 NOTE — ASSESSMENT & PLAN NOTE
Patient with left carotid artery disease stroke  · CTA head and neck reveals extensive intracranial atherosclerotic disease stenosis extensive disease bilateral carotid arteries  · Evaluated by vascular surgery, the patient is at high risk for hemorrhagic conversion given brain MRI finding of petechial microhemorrhage  · Continue aspirin statin Brilinta  · Plan for left transcarotid artery revascularization today by vascular

## 2023-09-07 NOTE — PROGRESS NOTES
[address] [place] [address]  Progress Note  Name: Romana Dates  MRN: [id number]  Unit/Bed#: [facility] 710-01 I Date of Admission: 9/3/2023   Date of Service: 9/7/2023 I Hospital Day: 4    Assessment/Plan   * Acute CVA (cerebrovascular accident) [facility])  Assessment & Plan  69 y/o F w/ symptomatic LICA stenosis w/ recurrent ischemic CVA (Aphasia persistent/R-sided weakness improved)    Plan: To OR today for Left TCAR w/ Dr. Janneth Zuniga ASA, Brilinta, statin  NPO  Nephrology following, RICARDO on CKD likely related to contrast, Cr 1.6 today within baseline and improved from 2.3 yesterday w/ IVF  Neurology following, ok to proceed with surgery today, no evidence of increased petechial hemorrhage on repeat CTH, benefits outweigh the risk in setting of recurrent ischemic events                   Subjective:  Pt remains aphasic, awake and alert on exam    Vitals:  /70   Pulse 73   Temp 98 °F (36.7 °C)   Resp 16   Ht 4' 10" (1.473 m)   Wt 71.9 kg (158 lb 6.4 oz)   LMP  (LMP Unknown)   SpO2 98%   BMI 33.11 kg/m²     I/Os:  I/O last 3 completed shifts:  In: -   Out: 250 [Urine:250]  No intake/output data recorded. Lab Results and Cultures:   Lab Results   Component Value Date    WBC 8.27 09/06/2023    HGB 12.4 09/06/2023    HCT 35.6 09/06/2023    MCV 91 09/06/2023     09/06/2023     Lab Results   Component Value Date    CALCIUM 6.9 (L) 09/07/2023    K 4.3 09/07/2023    CO2 19 (L) 09/07/2023     09/07/2023    BUN 34 (H) 09/07/2023    CREATININE 1.67 (H) 09/07/2023     Lab Results   Component Value Date    INR 0.98 09/03/2023    INR 1.07 01/22/2022    INR 0.96 03/24/2020    PROTIME 13.2 09/03/2023    PROTIME 13.5 01/22/2022    PROTIME 12.7 03/24/2020        Blood Culture:   Lab Results   Component Value Date    BLOODCX No Growth After 5 Days.  01/22/2022   ,   Urinalysis:   Lab Results   Component Value Date    COLORU Yellow 09/06/2023    CLARITYU Turbid 09/06/2023    SPECGRAV 1.016 09/06/2023    PHUR 6.0 09/06/2023    LEUKOCYTESUR Large (A) 09/06/2023    NITRITE Negative 09/06/2023    GLUCOSEU Negative 09/06/2023    KETONESU Negative 09/06/2023    BILIRUBINUR Negative 09/06/2023    BLOODU Negative 09/06/2023   ,   Urine Culture:   Lab Results   Component Value Date    URINECX >100,000 cfu/ml Escherichia coli (A) 08/22/2023   ,   Wound Culure: No results found for: "WOUNDCULT"    Medications:  Current Facility-Administered Medications   Medication Dose Route Frequency   • acetaminophen (TYLENOL) tablet 650 mg  650 mg Oral Q6H PRN   • allopurinol (ZYLOPRIM) tablet 100 mg  100 mg Oral Daily   • aluminum-magnesium hydroxide-simethicone (MAALOX) oral suspension 30 mL  30 mL Oral Q6H PRN   • amLODIPine (NORVASC) tablet 5 mg  5 mg Oral Daily   • aspirin chewable tablet 81 mg  81 mg Oral Daily   • chlorhexidine (PERIDEX) 0.12 % oral rinse 15 mL  15 mL Swish & Spit Once   • cinacalcet (SENSIPAR) tablet 30 mg  30 mg Oral Every Other Day   • [START ON 9/9/2023] cloNIDine (CATAPRES-TTS-3) 0.3 mg/24 hr TD weekly patch  1 patch Transdermal Weekly   • escitalopram (LEXAPRO) tablet 10 mg  10 mg Oral Daily   • heparin (porcine) subcutaneous injection 5,000 Units  5,000 Units Subcutaneous Q8H [facility]   • insulin glargine (LANTUS) subcutaneous injection 10 Units 0.1 mL  10 Units Subcutaneous HS   • insulin lispro (HumaLOG) 100 units/mL subcutaneous injection 1-5 Units  1-5 Units Subcutaneous TID AC   • insulin lispro (HumaLOG) 100 units/mL subcutaneous injection 3 Units  3 Units Subcutaneous TID With Meals   • labetalol (NORMODYNE) injection 10 mg  10 mg Intravenous Q10 Min PRN   • labetalol (NORMODYNE) tablet 300 mg  300 mg Oral Q12H JAVIER   • lidocaine (LIDODERM) 5 % patch 2 patch  2 patch Topical Daily   • multi-electrolyte (PLASMALYTE-A/ISOLYTE-S PH 7.4) IV solution  50 mL/hr Intravenous Continuous   • ondansetron (ZOFRAN) injection 4 mg  4 mg Intravenous Q6H PRN   • polyethylene glycol (MIRALAX) packet 17 g 17 g Oral Daily   • pravastatin (PRAVACHOL) tablet 80 mg  80 mg Oral Daily With Dinner   • ticagrelor (BRILINTA) tablet 90 mg  90 mg Oral Q12H [address]   • traZODone (DESYREL) tablet 50 mg  50 mg Oral HS         Physical Exam:    General appearance: alert  Neurologic: expressive aphasia, M/S and CN grossly intact  Lungs: clear to auscultation bilaterally  Heart: S1, S2 normal  Abdomen: soft, non-tender; bowel sounds normal; no masses,  no organomegaly  Extremities: extremities normal, warm and well-perfused; no cyanosis, clubbing, or edema          Bishop Lul PA-C  9/7/2023

## 2023-09-07 NOTE — PLAN OF CARE
Problem: MOBILITY - ADULT  Goal: Maintain or return to baseline ADL function  Description: INTERVENTIONS:  -  Assess patient's ability to carry out ADLs; assess patient's baseline for ADL function and identify physical deficits which impact ability to perform ADLs (bathing, care of mouth/teeth, toileting, grooming, dressing, etc.)  - Assess/evaluate cause of self-care deficits   - Assess range of motion  - Assess patient's mobility; develop plan if impaired  - Assess patient's need for assistive devices and provide as appropriate  - Encourage maximum independence but intervene and supervise when necessary  - Involve family in performance of ADLs  - Assess for home care needs following discharge   - Consider OT consult to assist with ADL evaluation and planning for discharge  - Provide patient education as appropriate  Outcome: Progressing  Goal: Maintains/Returns to pre admission functional level  Description: INTERVENTIONS:  - Perform BMAT or MOVE assessment daily.   - Set and communicate daily mobility goal to care team and patient/family/caregiver. - Collaborate with rehabilitation services on mobility goals if consulted  - Perform Range of Motion 3 times a day. - Reposition patient every 3 hours.   - Dangle patient 3 times a day  - Stand patient 3 times a day  - Ambulate patient 3 times a day  - Out of bed to chair 3 times a day   - Out of bed for meals 3 times a day  - Out of bed for toileting  - Record patient progress and toleration of activity level   Outcome: Progressing     Problem: PAIN - ADULT  Goal: Verbalizes/displays adequate comfort level or baseline comfort level  Description: Interventions:  - Encourage patient to monitor pain and request assistance  - Assess pain using appropriate pain scale  - Administer analgesics based on type and severity of pain and evaluate response  - Implement non-pharmacological measures as appropriate and evaluate response  - Consider cultural and social influences on pain and pain management  - Notify physician/advanced practitioner if interventions unsuccessful or patient reports new pain  Outcome: Progressing     Problem: INFECTION - ADULT  Goal: Absence or prevention of progression during hospitalization  Description: INTERVENTIONS:  - Assess and monitor for signs and symptoms of infection  - Monitor lab/diagnostic results  - Monitor all insertion sites, i.e. indwelling lines, tubes, and drains  - Monitor endotracheal if appropriate and nasal secretions for changes in amount and color  - [place] appropriate cooling/warming therapies per order  - Administer medications as ordered  - Instruct and encourage patient and family to use good hand hygiene technique  - Identify and instruct in appropriate isolation precautions for identified infection/condition  Outcome: Progressing  Goal: Absence of fever/infection during neutropenic period  Description: INTERVENTIONS:  - Monitor WBC    Outcome: Progressing     Problem: SAFETY ADULT  Goal: Maintain or return to baseline ADL function  Description: INTERVENTIONS:  -  Assess patient's ability to carry out ADLs; assess patient's baseline for ADL function and identify physical deficits which impact ability to perform ADLs (bathing, care of mouth/teeth, toileting, grooming, dressing, etc.)  - Assess/evaluate cause of self-care deficits   - Assess range of motion  - Assess patient's mobility; develop plan if impaired  - Assess patient's need for assistive devices and provide as appropriate  - Encourage maximum independence but intervene and supervise when necessary  - Involve family in performance of ADLs  - Assess for home care needs following discharge   - Consider OT consult to assist with ADL evaluation and planning for discharge  - Provide patient education as appropriate  Outcome: Progressing  Goal: Maintains/Returns to pre admission functional level  Description: INTERVENTIONS:  - Perform BMAT or MOVE assessment daily.   - Set and communicate daily mobility goal to care team and patient/family/caregiver. - Collaborate with rehabilitation services on mobility goals if consulted  - Perform Range of Motion 3 times a day. - Reposition patient every 3 hours.   - Dangle patient 3 times a day  - Stand patient 3 times a day  - Ambulate patient 3 times a day  - Out of bed to chair 3 times a day   - Out of bed for meals 3 times a day  - Out of bed for toileting  - Record patient progress and toleration of activity level   Outcome: Progressing  Goal: Patient will remain free of falls  Description: INTERVENTIONS:  - Educate patient/family on patient safety including physical limitations  - Instruct patient to call for assistance with activity   - Consult OT/PT to assist with strengthening/mobility   - Keep Call bell within reach  - Keep bed low and locked with side rails adjusted as appropriate  - Keep care items and personal belongings within reach  - Initiate and maintain comfort rounds  - Make Fall Risk Sign visible to staff  - Offer Toileting every 2-4  Hours, in advance of need  - Initiate/Maintain bed and chair alarm  - Obtain necessary fall risk management equipment: adequate lighting, nonskid socks and orientation to environment   - Apply yellow socks and bracelet for high fall risk patients  - Consider moving patient to room near nurses station  Outcome: Progressing     Problem: DISCHARGE PLANNING  Goal: Discharge to home or other facility with appropriate resources  Description: INTERVENTIONS:  - Identify barriers to discharge w/patient and caregiver  - Arrange for needed discharge resources and transportation as appropriate  - Identify discharge learning needs (meds, wound care, etc.)  - Arrange for interpretive services to assist at discharge as needed  - Refer to Case Management Department for coordinating discharge planning if the patient needs post-hospital services based on physician/advanced practitioner order or complex needs related to functional status, cognitive ability, or social support system  Outcome: Progressing     Problem: Knowledge Deficit  Goal: Patient/family/caregiver demonstrates understanding of disease process, treatment plan, medications, and discharge instructions  Description: Complete learning assessment and assess knowledge base.   Interventions:  - Provide teaching at level of understanding  - Provide teaching via preferred learning methods  Outcome: Progressing     Problem: Prexisting or High Potential for Compromised Skin Integrity  Goal: Skin integrity is maintained or improved  Description: INTERVENTIONS:  - Identify patients at risk for skin breakdown  - Assess and monitor skin integrity  - Assess and monitor nutrition and hydration status  - Monitor labs   - Assess for incontinence   - Turn and reposition patient  - Assist with mobility/ambulation  - Relieve pressure over bony prominences  - Avoid friction and shearing  - Provide appropriate hygiene as needed including keeping skin clean and dry  - Evaluate need for skin moisturizer/barrier cream  - Collaborate with interdisciplinary team   - Patient/family teaching  - Consider wound care consult   Outcome: Progressing     Problem: NEUROSENSORY - ADULT  Goal: Achieves stable or improved neurological status  Description: INTERVENTIONS  - Monitor and report changes in neurological status  - Monitor vital signs such as temperature, blood pressure, glucose, and any other labs ordered   - Initiate measures to prevent increased intracranial pressure  - Monitor for seizure activity and implement precautions if appropriate      Outcome: Progressing     Problem: CARDIOVASCULAR - ADULT  Goal: Maintains optimal cardiac output and hemodynamic stability  Description: INTERVENTIONS:  - Monitor I/O, vital signs and rhythm  - Monitor for S/S and trends of decreased cardiac output  - Administer and titrate ordered vasoactive medications to optimize hemodynamic stability  - Assess quality of pulses, skin color and temperature  - Assess for signs of decreased coronary artery perfusion  - Instruct patient to report change in severity of symptoms  Outcome: Progressing     Problem: Nutrition/Hydration-ADULT  Goal: Nutrient/Hydration intake appropriate for improving, restoring or maintaining nutritional needs  Description: Monitor and assess patient's nutrition/hydration status for malnutrition. Collaborate with interdisciplinary team and initiate plan and interventions as ordered. Monitor patient's weight and dietary intake as ordered or per policy. Utilize nutrition screening tool and intervene as necessary. Determine patient's food preferences and provide high-protein, high-caloric foods as appropriate.      INTERVENTIONS:  - Monitor oral intake, urinary output, labs, and treatment plans  - Assess nutrition and hydration status and recommend course of action  - Evaluate amount of meals eaten  - Assist patient with eating if necessary   - Allow adequate time for meals  - Recommend/ encourage appropriate diets, oral nutritional supplements, and vitamin/mineral supplements  - Order, calculate, and assess calorie counts as needed  - Recommend, monitor, and adjust tube feedings and TPN/PPN based on assessed needs  - Assess need for intravenous fluids  - Provide specific nutrition/hydration education as appropriate  - Include patient/family/caregiver in decisions related to nutrition  Outcome: Progressing

## 2023-09-07 NOTE — ASSESSMENT & PLAN NOTE
Lab Results   Component Value Date    EGFR 29 09/07/2023    EGFR 20 09/06/2023    EGFR 28 09/04/2023    CREATININE 1.67 (H) 09/07/2023    CREATININE 2.26 (H) 09/06/2023    CREATININE 1.73 (H) 09/04/2023     · Kidney function stable within baseline   · Nephrology is following  · Avoid nephrotoxins and hypotension  · Continue to monitor

## 2023-09-07 NOTE — PLAN OF CARE
Problem: OCCUPATIONAL THERAPY ADULT  Goal: Performs self-care activities at highest level of function for planned discharge setting. See evaluation for individualized goals. Description: Treatment Interventions: ADL retraining, Functional transfer training, UE strengthening/ROM, Endurance training, Cognitive reorientation, Patient/family training, Equipment evaluation/education, Compensatory technique education, Activityengagement, Energy conservation          See flowsheet documentation for full assessment, interventions and recommendations. Outcome: Progressing  Note: Limitation: Decreased ADL status, Decreased UE strength, Decreased cognition, Decreased Safe judgement during ADL, Decreased UE ROM, Decreased endurance, Decreased fine motor control, Decreased high-level ADLs  Prognosis: Fair  Assessment: Pt. participated in a skilled OT session focusing on activity tolerance, standing and sitting dynamic balance, strength, coordination, and energy conservation techniques for increased participation in ADLs and self-care tasks. Pt. found seated in chair awake and alert. Pt. cognitive status was noted to be impaired this session as she required verbal cues to initiate tasks but presented pleasant and cooperative throughout the therapy session. Pt. performed functional transfers with assist X2 *2 decreased strength. Pt. performed grooming / hygiene tasks with assist 2* coordination deficits. Pt. did not state her therapy goal at this time 2* aphasia. Pt will benefit from continued OT services for ADL retraining, endurance training, and transfer training. OT recommends post acute rehab services upon discharge to continue to address the deficits noted above which currently impair pt. ADL and occupational performance. Upon conclusion of OT session pt. returned seated in chair with activated chair alarm and all needs within reach.      OT Discharge Recommendation: Post acute rehabilitation services

## 2023-09-07 NOTE — QUICK NOTE
Post-Op Check - Vascular Surgery   Pennie Sage 70 y.o. female MRN: [id number]  Unit/Bed#: [facility] 502-01 Encounter: [id number]    ASSESMENT:  81yoF s/p L TCAR    PLAN:  - Incentive spirometry  - Diet as tolerated  - Pain control PRN  - Strict BP control 110-160; was given labetolol and hydralazine per protocol in PACU, to maintain blood pressure goals, follow protocol Cardene gtt as needed   - I/Os  - ASA/Brillinta  - Frequent Neurovascular checks    Subjective: Patient without chest pain, paresthesias, headache, weakness. Mouth feels dry. Vitals:  /55   Pulse 80   Temp (!) 97.2 °F (36.2 °C)   Resp 16   Ht 4' 10" (1.473 m)   Wt 71.9 kg (158 lb 6.4 oz)   LMP  (LMP Unknown)   SpO2 99%   BMI 33.11 kg/m²     Physical Exam:  General appearance: A&Ox3 in no acute distress  Neurologic: Patient responding to questions and commands appropriately. No noted facial droop or tongue deviation. Gross symmetrical sensation sense. Grossly symmetrical UE and LE motor function  Neck: supple, symmetrical, trachea midline  Lungs: In no acute respiratory distress, nasal cannula in place. \  Heart: Regular rate, grossly warm and well-perfused  Abdomen: soft, rounded non-tender abdomen R groin femoral access clean and dry, soft without hematoma, non-tender to palpation  Extremities: Bilateral UE and LE M/S intact. Wound/Incision:  Incision site clean, dry and intact with noted exofin    Pulse exam:  Femoral: Right: 2+ Left: 2+    I/Os:  I/O last 3 completed shifts:  In: -   Out: 250 [Urine:250]  I/O this shift:  In: 3540.8 [P.O.:75; I.V.:3465.8]  Out: 275 [Urine:175; Blood:100]    Invasive Lines/Tubes:  Invasive Devices     Peripheral Intravenous Line  Duration           Peripheral IV 09/04/23 Distal;Dorsal (posterior); Right Forearm 3 days    Peripheral IV 09/07/23 Left Wrist <1 day          Arterial Line  Duration           Arterial Line 09/07/23 Right Radial <1 day              VTE Prophylaxis: Heparin    Jonathan Hernandez Judit Cai [place]  9/7/2023

## 2023-09-07 NOTE — ED ATTENDING ATTESTATION
9/3/2023  IBing MD, saw and evaluated the patient. I have discussed the patient with the resident/non-physician practitioner and agree with the resident's/non-physician practitioner's findings, Plan of Care, and MDM as documented in the resident's/non-physician practitioner's note, except where noted. All available labs and Radiology studies were reviewed. I was present for key portions of any procedure(s) performed by the resident/non-physician practitioner and I was immediately available to provide assistance. At this point I agree with the current assessment done in the Emergency Department. I have conducted an independent evaluation of this patient a history and physical is as follows:    OA: 69 y/o m presents form rehab s/p recent CVA with concern for new right sided weakness. Pt was seen earlier today at approximately 10:30 and found to be at her baseline. Then reported to be in PT and noted to have R sided weakness. 911 called. On arrival, pt with improvement of sxms rapidly. Baseline deficits of word finding unchanged per records. No report of falls/trauma/fevers/chills/  cp/sob/n/v/lightheadedness. PE, chronoically ill appearing, HTN, NC/AT, PERRL, b/l lateral hemianopsia, RUE 3-4/5 LUE 5/5, RLE2/5, LLE 5/5, no dysarthria, questionable intermittent aphasia, RR, lungs CTAB, abd soft, +BS, -edema. A/p stroke alert, Neuro to ED. Recent stroke and on medications. Labs, imaging.  Treat per neuro recs, admit  ED Course         Critical Care Time  Procedures

## 2023-09-07 NOTE — PROGRESS NOTES
Progress Note - General Surgery   Milly Michael 70 y.o. female MRN: [id number]  Unit/Bed#: [facility] 710-01 Encounter: [id number]    Assessment:  70y.o. year old female with history of remote tobacco abuse, hypertension, hyperlipidemia, CKD 3, depression, obesity, and known bilateral carotid stenosis who suffered a left occipital/left MCA territory CVA July/ August 2023. She has been maintained on aspirin and Brilinta. A/f she presented due to concern for right sided weakness while working with physical therapy and found to have  Small areas of ischemic stroke on the L parietal region on MRI     Plan:  -- patient will benefit from  L TCAR, will discuss timing  --cont statin    Subjective/Objective     Subjective: No acute events overnight. Objective:     Blood pressure 151/70, pulse 73, temperature 98 °F (36.7 °C), resp. rate 16, height 4' 10" (1.473 m), weight 71.9 kg (158 lb 6.4 oz), SpO2 98 %. ,Body mass index is 33.11 kg/m². Intake/Output Summary (Last 24 hours) at 9/7/2023 0704  Last data filed at 9/7/2023 0103  Gross per 24 hour   Intake --   Output 250 ml   Net -250 ml       Invasive Devices     Peripheral Intravenous Line  Duration           Peripheral IV 09/04/23 Distal;Dorsal (posterior); Right Forearm 3 days                Physical Exam: deferred     Lab, Imaging and other studies:  CBC: No results found for: "WBC", "HGB", "HCT", "MCV", "PLT", "ADJUSTEDWBC", "RBC", "MCH", "MCHC", "RDW", "MPV", "NRBC", CMP:   Lab Results   Component Value Date    SODIUM 138 09/07/2023    K 4.3 09/07/2023     09/07/2023    CO2 19 (L) 09/07/2023    BUN 34 (H) 09/07/2023    CREATININE 1.67 (H) 09/07/2023    CALCIUM 6.9 (L) 09/07/2023    EGFR 29 09/07/2023   , Coagulation: No results found for: "PT", "INR", "APTT"  VTE Pharmacologic Prophylaxis: Sequential compression device (Venodyne)   VTE Mechanical Prophylaxis: sequential compression device

## 2023-09-07 NOTE — ASSESSMENT & PLAN NOTE
Patient presents with acute onset right upper and lower extremity weakness while at SNF undergoing physical therapy for recent stroke in mid-August. She was a stroke alert and was evaluated by neurology in the ED. Patient symptomatically improving at time of admission. Brain MRI with scattered multifocal infarctions predominantly in the left occipital lobe as well as in the left frontal and parietal lobes in the watershed territory, slightly increased in size and number from the prior study indicative of trace crescendo infarctions/progressive infarctions associated with previously present infarctions in these region.   · Per neurology suspect acute stroke secondary to symptomatic ICA stenosis and recommending vascular surgery evaluation   · Continue aspirin, Brilinta, pravastatin  · Plan for left TCAR today  · Monitor

## 2023-09-07 NOTE — ASSESSMENT & PLAN NOTE
71 y/o F w/ symptomatic LICA stenosis w/ recurrent ischemic CVA (Aphasia persistent/R-sided weakness improved)    Plan:   To OR today for Left TCAR w/ Dr. Luigi Anaya ASA, Brilinta, statin  NPO  Nephrology following, RICARDO on CKD likely related to contrast, Cr 1.6 today within baseline and improved from 2.3 yesterday w/ IVF  Neurology following, ok to proceed with surgery today, no evidence of increased petechial hemorrhage on repeat CTH, benefits outweigh the risk in setting of recurrent ischemic events

## 2023-09-07 NOTE — ASSESSMENT & PLAN NOTE
Lab Results   Component Value Date    HGBA1C 7.3 (H) 09/03/2023       Recent Labs     09/06/23  1124 09/06/23  1551 09/07/23  0618 09/07/23  1044   POCGLU 148* 96 102 148*       Blood Sugar Average: Last 72 hrs:  (P) 138     Uncontrolled  · Hold glipizide while inpatient  · Continue Lantus at bedtime and Humalog with meals  · Monitor Accu-Cheks and adjust as needed   · Avoid hypoglycemia  · Hypoglycemia protocol in place

## 2023-09-07 NOTE — ASSESSMENT & PLAN NOTE
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 This occurred while she was working with PT at [facility]. Denied headache, dizziness, numbness/tingling. Initial NIHSS 8 - inability to accurately state month and age, R hemianopia, RLE weakness, RLE decreased sensation, moderate expressive aphasia. Patient has been on ASA/Brilinta since 7/29/2023 for CVA. No TNK administered given complete resolution of acute symptoms. CTH showed stable subacute infarcts within the L occipital and parietal lobes. CTA showed stable advanced atherosclerotic disease throughout cervical and intracranial vasculature with multiple areas of severe stenosis most prominent within the ICA bulb bilaterally; no significant change compared with prior examination. Patient was recently scheduled for TCAR with vascular surgery but this was canceled due to elevated A1c level. Admitted on stroke pathway. MRI brain concerning for acute stroke, etiology most likely associated with severe ICA stenosis. Vascular surgery team consulted for intervention. Prior work-up:  -Echo, 8/18/23: EF 62%. G1DD, LA normal size, no major valvular dysfunction. -MRI brain wo contrast, 9/5/23: 1. Scattered multifocal infarctions predominantly in the left occipital lobe as well as in the left frontal and parietal lobes in the watershed territory, slightly increased in size and number from the prior study indicative of trace crescendo   infarctions/progressive infarctions associated with previously present infarctions in these region. 2. Trace T1 high signal in the left occipital lobe is worrisome for petechial microhemorrhage. No significant mass effect.  Recommend follow-up repeat head CT in 24 hours to assess for stability. 3. Moderate, chronic microangiopathy. Lab Results   Component Value Date    HGBA1C 7.3 (H) 09/03/2023    LDLCALC 59 09/03/2023       Impression: MRI brain demonstrated multifocal acute infarcts in the left occipital, left frontal, left parietal regions and watershed territories. Etiology suspected to be secondary to hypoperfusion in the setting of severe ICA stenosis. Plan, Stroke Pathway:  · Discussed with attending Dr. Diogo Holloway  · Continue home ASA/Brilinta  · Continue home Pravastatin 80mg daily with dinner  · SBP <160 but low threshold for further fluid boluses if symptomatic with lower SBPs. Recommend normotensive goal after TCAR procedure. · Plan for L TCAR with vascular surgery team today. Will defer antiplatelet plan to vascular surgery team. Currently on ASA/Brilinta, original plan for 90 days, followed by ASA monotherapy. · Given concerns for poststroke depression/anxiety, started Lexapro 10 mg daily. · Normoglycemia, normothermia. · Monitor on telemetry  · Bedside swallow eval  · Stat CT Head for change in neuro status. · DVT ppx, SCDs  • PT/OT/PM&R evaluations   • Medical management as per primary team appreciated.

## 2023-09-07 NOTE — ASSESSMENT & PLAN NOTE
Cystic lesion in the right kidney on renal ultrasound  Patient will need a contrast-enhanced CT scan or MRI using renal mass protocol  at some point for further assessment

## 2023-09-07 NOTE — ANESTHESIA POSTPROCEDURE EVALUATION
Post-Op Assessment Note    CV Status:  Stable  Pain Score: 0    Pain management: adequate     Mental Status:  Alert and awake   Hydration Status:  Euvolemic   PONV Controlled:  Controlled   Airway Patency:  Patent      Post Op Vitals Reviewed: Yes      Staff: CRNA         No notable events documented.     BP   158/58   Temp   96.9   Pulse  84   Resp   19   SpO2   99

## 2023-09-07 NOTE — DISCHARGE INSTR - OTHER ORDERS
Incisional care:   Wash incision daily w/ soap and water. Pat dry thoroughly. Allow skin glue to slough    Skin care plans:  1-Hydraguard to bilateral sacrum, buttock and heels BID and PRN  2-Elevate heels to offload pressure. 3-Ehob cushion in chair when out of bed. 4-Moisturize skin daily with skin nourishing cream.  5-Turn/reposition q2h or when medically stable for pressure re-distribution on skin.

## 2023-09-07 NOTE — PROGRESS NOTES
[facility] [facility]  Progress Note  Name: Gilberto Hobbs  MRN: [id number]  Unit/Bed#: OR POOL I Date of Admission: 9/3/2023   Date of Service: 9/7/2023 I Hospital Day: 4    Assessment/Plan   * Acute CVA (cerebrovascular accident) [facility])  Assessment & Plan  Patient presents with acute onset right upper and lower extremity weakness while at SNF undergoing physical therapy for recent stroke in mid-August. She was a stroke alert and was evaluated by neurology in the ED. Patient symptomatically improving at time of admission. Brain MRI with scattered multifocal infarctions predominantly in the left occipital lobe as well as in the left frontal and parietal lobes in the watershed territory, slightly increased in size and number from the prior study indicative of trace crescendo infarctions/progressive infarctions associated with previously present infarctions in these region.   · Per neurology suspect acute stroke secondary to symptomatic ICA stenosis and recommending vascular surgery evaluation   · Continue aspirin, Brilinta, pravastatin  · Plan for left TCAR today  · Monitor    Symptomatic carotid artery stenosis, left  Assessment & Plan  Patient with left carotid artery disease stroke  · CTA head and neck reveals extensive intracranial atherosclerotic disease stenosis extensive disease bilateral carotid arteries  · Evaluated by vascular surgery, the patient is at high risk for hemorrhagic conversion given brain MRI finding of petechial microhemorrhage  · Continue aspirin statin Brilinta  · Plan for left transcarotid artery revascularization today by vascular    Aphasia as late effect of cerebrovascular accident (CVA)  Assessment & Plan  · Supportive cares    Renal cyst  Assessment & Plan  Cystic lesion in the right kidney on renal ultrasound  Patient will need a contrast-enhanced CT scan or MRI using renal mass protocol  at some point for further assessment    Primary hypertension  Assessment & Plan  Continue amlodipine, clonidine and  labetalol   Losartan on hold  Monitor    Aortoiliac occlusive disease (HCC)  Assessment & Plan  · Continue aspirin statin  · Outpatient vascular surgery follow-up    Type 2 diabetes mellitus, without long-term current use of insulin [facility])  Assessment & Plan  Lab Results   Component Value Date    HGBA1C 7.3 (H) 09/03/2023       Recent Labs     09/06/23  1124 09/06/23  1551 09/07/23  0618 09/07/23  1044   POCGLU 148* 96 102 148*       Blood Sugar Average: Last 72 hrs:  (P) 138     Uncontrolled  · Hold glipizide while inpatient  · Continue Lantus at bedtime and Humalog with meals  · Monitor Accu-Cheks and adjust as needed   · Avoid hypoglycemia  · Hypoglycemia protocol in place    Depression, recurrent (HCC)  Assessment & Plan  · Continue trazodone  · Started on Lexapro by neurology    Hyperlipidemia, unspecified  Assessment & Plan  History of myalgias to statin  · Patient on pravastatin and tolerating, continue same     Obesity (BMI 30-39. 9)  Assessment & Plan  · Therapeutic diet and lifestyle modification encouraged    Stage 3 chronic kidney disease [facility])  Assessment & Plan  Lab Results   Component Value Date    EGFR 29 09/07/2023    EGFR 20 09/06/2023    EGFR 28 09/04/2023    CREATININE 1.67 (H) 09/07/2023    CREATININE 2.26 (H) 09/06/2023    CREATININE 1.73 (H) 09/04/2023     · Kidney function stable within baseline   · Nephrology is following  · Avoid nephrotoxins and hypotension  · Continue to monitor           VTE Pharmacologic Prophylaxis: VTE Score: 11 High Risk (Score >/= 5) - Pharmacological DVT Prophylaxis Ordered: heparin. Sequential Compression Devices Ordered. Patient Centered Rounds: I performed bedside rounds with nursing staff today. Discussions with Specialists or Other Care Team Provider: Nephrology    Education and Discussions with Family / Patient: Updated  (son) via phone.     Total Time Spent on Date of Encounter in care of patient: 45 minutes This time was spent on one or more of the following: performing physical exam; counseling and coordination of care; obtaining or reviewing history; documenting in the medical record; reviewing/ordering tests, medications or procedures; communicating with other healthcare professionals and discussing with patient's family/caregivers. Current Length of Stay: 4 day(s)  Current Patient Status: Inpatient   Certification Statement: The patient will continue to require additional inpatient hospital stay due to Management of carotid artery stenosis  Discharge Plan: Anticipate discharge in 48 hrs to rehab facility. Code Status: Level 3 - DNAR and DNI    Subjective:     Patient seen and examined earlier today  Comfortable sitting in chair  No event overnight  Currently n.p.o. for carotid surgery  Chest pain or shortness of breath    Objective:     Vitals:   Temp (24hrs), Av.8 °F (36.6 °C), Min:97.8 °F (36.6 °C), Max:97.8 °F (36.6 °C)    Temp:  [97.8 °F (36.6 °C)] 97.8 °F (36.6 °C)  HR:  [63-73] 67  Resp:  [18] 18  BP: (145-153)/(63-71) 153/71  SpO2:  [96 %-98 %] 96 %  Body mass index is 33.11 kg/m². Input and Output Summary (last 24 hours):      Intake/Output Summary (Last 24 hours) at 2023 1413  Last data filed at 2023 0936  Gross per 24 hour   Intake 1040.83 ml   Output 425 ml   Net 615.83 ml       Physical Exam:   Physical Exam   Patient is awake alert no acute distress   Comfortable in chair  Baseline expressive aphasia  Lung clear to auscultation bilateral  Heart positive S1-S2 no murmur  Abdomen soft nontender  Lower extremities no edema    Additional Data:     Labs:  Results from last 7 days   Lab Units 23  0511 23  0528   WBC Thousand/uL 8.27 7.13   HEMOGLOBIN g/dL 12.4 13.2   HEMATOCRIT % 35.6 37.4   PLATELETS Thousands/uL 282 274   NEUTROS PCT %  --  60   LYMPHS PCT %  --  21   MONOS PCT %  --  12   EOS PCT %  --  5     Results from last 7 days   Lab Units 09/07/23  0459   SODIUM mmol/L 138   POTASSIUM mmol/L 4.3   CHLORIDE mmol/L 103   CO2 mmol/L 19*   BUN mg/dL 34*   CREATININE mg/dL 1.67*   ANION GAP mmol/L 16   CALCIUM mg/dL 6.9*   GLUCOSE RANDOM mg/dL 82     Results from last 7 days   Lab Units 09/03/23  1314   INR  0.98     Results from last 7 days   Lab Units 09/07/23  1044 09/07/23  0618 09/06/23  1551 09/06/23  1124 09/06/23  0718 09/05/23  2117 09/05/23  1621 09/05/23  1224 09/05/23  0603 09/04/23  2058 09/04/23  1727 09/04/23  1216   POC GLUCOSE mg/dl 148* 102 96 148* 189* 127 78 169* 123 99 122 284*     Results from last 7 days   Lab Units 09/03/23  1314   HEMOGLOBIN A1C % 7.3*           Lines/Drains:  Invasive Devices     Peripheral Intravenous Line  Duration           Peripheral IV 09/04/23 Distal;Dorsal (posterior); Right Forearm 3 days    Peripheral IV 09/07/23 Left Wrist <1 day          Arterial Line  Duration           Arterial Line 09/07/23 Right Radial <1 day          Airway  Duration           ETT  Cuffed;Oral 7 mm <1 day                      Imaging: Renal ultrasound with cystic lesion    Recent Cultures (last 7 days):         Last 24 Hours Medication List:   Current Facility-Administered Medications   Medication Dose Route Frequency Provider Last Rate   • [MAR Hold] acetaminophen  650 mg Oral Q6H PRN Fabio Powell PA-C     • [MAR Hold] allopurinol  100 mg Oral Daily Raymundo Kc MD     • [facility] Hold] aluminum-magnesium hydroxide-simethicone  30 mL Oral Q6H PRN Raymundo Kc MD     • [facility] Hold] amLODIPine  5 mg Oral Daily Nba Justin DO     • [facility] Hold] aspirin  81 mg Oral Daily Tushar Ortiz DO     • chlorhexidine  15 mL Swish & Spit Once Aryan Regalado PA-C     • [facility] Hold] cinacalcet  30 mg Oral Every Other Day Raymundo Kc MD     • [facility] Hold] cloNIDine  1 patch Transdermal Weekly Raymundo Kc MD     • [facility] Hold] escitalopram  10 mg Oral Daily Tushar Ortiz DO     • [facility] Hold] heparin (porcine)  5,000 Units Subcutaneous [facility] Steve Pardo MD     • [facility] insulin glargine  10 Units Subcutaneous HS Radha Sánchez, DO     • [facility] Hold] insulin lispro  1-5 Units Subcutaneous TID AC Nyla Duong, DO     • [MAR Hold] insulin lispro  3 Units Subcutaneous TID With Meals Steve Pardo MD     • [facility] Hold] labetalol  10 mg Intravenous Q10 Min PRN Zac Ruano, DO     • [facility] Hold] labetalol  300 mg Oral Q12H [address] Radha Sánchez, DO     • [facility] Hold] lidocaine  2 patch Topical Daily Zac Ruano DO     • multi-electrolyte  50 mL/hr Intravenous Continuous Radha Sánchez DO 50 mL/hr (09/07/23 1241)   • [MAR Hold] ondansetron  4 mg Intravenous Q6H PRN Steve Pardo MD     • [MAR Hold] polyethylene glycol  17 g Oral Daily Steve Pardo MD     • [facility] Hold] pravastatin  80 mg Oral Daily With Amelia Davila, DO     • [facility] Hold] ticagrelor  90 mg Oral Q12H [address] [facility] [place], DO     • [facility] Hold] traZODone  50 mg Oral HS Steve Pardo MD       Facility-Administered Medications Ordered in Other Encounters   Medication Dose Route Frequency Provider Last Rate   • ceFAZolin   Intravenous PRN Christina Love CRNA     • fentanyl citrate (PF)   Intravenous PRN Christina Love, ELBA     • glycopyrrolate   Intravenous PRN Christina Love, ELBA     • heparin (porcine)   Intravenous PRN Christina Love, CRNA     • labetalol   Intravenous PRN Christina Love, CRNA     • lidocaine (PF)   Intravenous PRN Christina Love, ELBA     • niCARdipine (CARDENE) 25 mg (STANDARD CONCENTRATION) in sodium chloride 0.9% 250 mL   Intravenous Continuous PRN Christina Love, CRNA     • phenylephrine (STACEY-SYNEPHRINE) 50 mg (STANDARD CONCENTRATION) in sodium chloride 0.9% 250 mL   Intravenous Continuous PRN Christina Hides, CRNA 70 mcg/min (09/07/23 1344)   • phenylephrine   Intravenous PRN Christina Love CRNA     • propofol   Intravenous PRN Christina Love CRNA     • ROCuronium   Intravenous PRN Gem Neri CRNA     • sodium chloride   Intravenous Continuous PRN Gem Neri CRNA          Today, Patient Was Seen By: Sun Garcia DO    **Please Note: This note may have been constructed using a voice recognition system. **

## 2023-09-07 NOTE — ANESTHESIA PROCEDURE NOTES
Arterial Line Insertion    Performed by: Jenn Zapata CRNA  Authorized by: Narciso Magana MD  Consent: Verbal consent obtained. Written consent obtained. Risks and benefits: risks, benefits and alternatives were discussed  Consent given by: patient  Patient understanding: patient states understanding of the procedure being performed  Patient consent: the patient's understanding of the procedure matches consent given  Procedure consent: procedure consent matches procedure scheduled  Relevant documents: relevant documents present and verified  Required items: required blood products, implants, devices, and special equipment available  Patient identity confirmed: verbally with patient and arm band  Preparation: Patient was prepped and draped in the usual sterile fashion. Indications: hemodynamic monitoring  Orientation:  Right  Location: radial artery  Procedure Details:  Needle gauge: 5.0 Fr Micropuncture kit utilized with US guidance.   Seldinger technique: Seldinger technique used  Number of attempts: 1    Post-procedure:  Post-procedure: dressing applied  Waveform: good waveform and waveform confirmed  Patient tolerance: Patient tolerated the procedure well with no immediate complications  Comments: US guidance

## 2023-09-07 NOTE — OP NOTE
OPERATIVE REPORT  PATIENT NAME: Ade Michelle    :    MRN: [id number]  Pt Location: [facility] OR ROOM 02    SURGERY DATE: 2023    Surgeon(s) and Role:     * Lolita Buenrostro MD - Primary     * Elijah Vital DO - Assisting    Preop Diagnosis:  Internal carotid artery stenosis, bilateral [I65.23]  Aphasia as late effect of cerebrovascular accident (CVA) [I69.320]  Acute CVA (cerebrovascular accident) ([address]) [I63.9]    Post-Op Diagnosis Codes:     * Internal carotid artery stenosis, bilateral [I65.23]     * Aphasia as late effect of cerebrovascular accident (CVA) [I69.320]     * Acute CVA (cerebrovascular accident) ([address]) [I63.9]    Procedure(s):  Left - L TCAR    Specimen(s):  * No specimens in log *    Estimated Blood Loss:   100 mL    Drains:  * No LDAs found *    Anesthesia Type:   General ET    Operative Indications:  Internal carotid artery stenosis, bilateral [I65.23]  Aphasia as late effect of cerebrovascular accident (CVA) [I69.320]  Acute CVA (cerebrovascular accident) ([address]) [I63.9]  69 yo F with hx of CAD, CKD, HLD, HTN, tobacco use who was originally seen by vascular in July/2023 due to asymptomatic left carotid artery stenosis causing a stroke within the left occipital/parietal/frontal lobes. Given the extent of the patient's stroke burden decision was made to show patient was medically optimized prior to intervention as there is concerned she could have hemorrhagic conversion of her stroke with reperfusion. Patient was placed on aspirin and Brilinta. Lipids were elevated and was placed on pravastatin 80. Her A1c in August was 8.5 and with glucose correction her most recent A1c is down to 7.3.         Operative Findings:  High grade left ICA stenosis successfully treated with 10x40 stent  The Common carotid artery is heavily calcified but patent  The common carotid artery is deep seated due to body habitus    Total contrast = 88UX    Complications:   None    Procedure and Technique:      After informed consent was obtained, the patient was brought to the hybrid OR and placed in the supine position. IV sedation was administered and an arterial line monitor was placed. The neck was extended and rotated to expose the left neck. The carotid artery was imaged via ultrasound and the carotid bifurcation was identified proximally 6 cm cephalad to the clavicle. The left neck was then prepped and draped in the regular sterile fashion as was the right groin. A timeout was performed. Local anesthetic was injected just above the clavicle over the sternocleidomastoid muscle. A 2-3 centimeter incision was made in a transverse fashion. Electrocautery was used to dissect through the soft tissue and the platysma was divided. The sternocleidomastoid muscle was identified and the 2 heads were . The internal jugular vein was identified and dissected free longitudinally over a 2-3 centimeters segment. The jugular vein was retracted laterally exposing the common carotid artery. The vagus nerve was identified posterior to the common carotid artery and was preserved throughout dissection. The anterior surface of the common carotid artery was exposed over a 2-3 centimeter segment and encircled inferiorly with umbilical tape. IV heparin was administered. An ACT level was obtained. Further heparin was administered to maintain a therapeutic level     A pursestring suture was placed on the anterior surface of the common carotid artery with a 5-0 Prolene suture and left untied. The right femoral vein was then imaged via ultrasound and cannulated with a micropuncture kit and a 5 Armenian sheath was placed. The left common carotid artery was cannulated with a micropuncture system through the pursestring suture. The dilator was then placed and images were obtained of the carotid bifurcation in multiple obliquities.  These showed patency of the external carotid artery and a critical stenosis of approximately 90 % in the internal carotid artery over a 1 cm segment. A stiff wire was placed short of the bifurcation. The reversal of flow sheath was then passed over this wire and positioned in the common carotid artery. The dilator and wire were removed. The sheath was then secured with silk suture. The reversal of flow venous sheath was then placed and the sheaths were connected with the filter which was prepped appropriately to avoid any air in the system. There was noted to be good flow retrograde through the filter. At this point, the common carotid artery was occluded with a profunda clamp. A roadmap image was obtained and a [address] wire was used to traverse the internal carotid artery stenosis. A 4 x 30 mm balloon was then positioned across the stenosis and inflated. The patient remained hemodynamically stable during this inflation. . A 10 x 40 mm self-expanding stent was then positioned across the stenosis and deployed. Completion imaging showed no significant residual stenosis in 2 separate obliquities. The wire was then removed and the profunda clamp was released. Completion imaging showed rapid flow through the internal carotid artery without significant residual stenosis. The reversal of flow tubing and filter were then disconnected, drained into the venous system, and then removed. The reversal of flow arterial sheath was then removed and the puncture site closed with the previously placed 5-0 Prolene pursestring suture. 30 mg of protamine were administered    No bleeding points were identified. The neck wound was then irrigated with antibiotic saline solution. The platysma was reapproximated with a running 3-0 vicryl suture. The skin was closed with a running 4-0 monocryl suture and dressed with histoacryl glue. The femoral sheath was removed and manual compression was held until hemostasis was achieved.      The patient remained neurologically intact after the procedure with unchaged prior neuro deficits from recent strokes. I was present for the entire procedure. Patient Disposition:  PACU       Vascular Quality Initiative - Carotid Artery Stent    Functional Status: Capable of only limited self-care, confined to bed or chair 50% or more of waking hours. High Risk Factors For CEA: Medical risk Age >=75, CAD>=2 vessel  and CKD, Creat>2.5 mg/di    Refused for Surgery: Yes    Pre-op Imaging is CT/CTA: Yes      Lesion Calcification: <=25% circumference    Arch Atherosclerosis: Mild    Urgency: Urgent      ASA: IV  Anesthesia:  IV Sedation with Anesthesia    Indication: Symptomatic Stenosis    Skin Prep: Chlor + Alcohol     Arch Type: Type I    Bovine Arch: no     Approach: Carotid open     Medication Loading: None    Prophylactic Anti-bradyarrhythmic yes    Anticoagulant: Heparin    Antiplatelet IIb/IIIa: no    Bradyarrhythmia Requiring Tx: No    Fluoro Time: 6.4 min    Distinct Lesions Treated: 1      If Distinct Lesions Treated is 1, Second Stenosis (Not Treated): No    Lesion Type: Atherosclerosis  Lesion Side: left    Lesion Location: ICA     ICA Distal Tortuosity: Moderate    Lesion Length: 10 mm    Lesion Stenosis:90%    Protection Device Used: Yes, successful      Protection Device Type: Flow reversal     Flow Reversal Type: [address] ENROUTE      Flow Reversal Time: 13 minutes    Pre-dilate Before Protection Device: yes  Angioplasty required in order to cross lesion with protection device. For TCAR procedures answer yes if vessel pre-dilated prior to stent placement, or no if not pre-dilated before stent placement. Pre-dilate Lesion: yes  Pre-dilate Lesion: Angioplasty required in order to cross lesion for stent placement. Required even if there is a technical failure in stent deployment.      Technical Failure: No       Number of Stents: 1    Post- dilate: No      Neurologic Change: No    Intra-Cranial Completion Angiogram: No    Total Procedure Time:   Event Time In Procedure Start 09/07/2023  1:45 PM   Procedure Closing 09/07/2023  2:40 PM   Procedure Finish            SIGNATURE: Christiano Gonzalez MD  DATE: September 7, 2023  TIME: 3:10 PM

## 2023-09-07 NOTE — OCCUPATIONAL THERAPY NOTE
Occupational Therapy Treatment Note       09/07/23 0846   OT Last Visit   OT Visit Date 09/07/23   Note Type   Note Type Treatment   Pain Assessment   Pain Assessment Tool FLACC   Pain Location/Orientation Location: Back   Pain Rating: FLACC (Rest) - Face 0   Pain Rating: FLACC (Rest) - Legs 0   Pain Rating: FLACC (Rest) - Activity 0   Pain Rating: FLACC (Rest) - Cry 0   Pain Rating: FLACC (Rest) - Consolability 0   Score: FLACC (Rest) 0   Pain Rating: FLACC (Activity) - Face 0   Pain Rating: FLACC (Activity) - Legs 0   Pain Rating: FLACC (Activity) - Activity 0   Pain Rating: FLACC (Activity) - Cry 0   Pain Rating: FLACC (Activity) - Consolability 0   Score: FLACC (Activity) 0   Restrictions/Precautions   Weight Bearing Precautions Per Order No   Other Precautions Cognitive; Chair Alarm;Multiple lines;Telemetry; Fall Risk   Lifestyle   Autonomy Prior to recent CVA, Pt was fully IND w all ADLS   Reciprocal Relationships Per prior therapy notes, Pt lives w/ her son. Service to Others Pt is retired   Intrinsic Gratification None stated   ADL   Where Assessed Standing at sink   Grooming Assistance 4  Minimal Assistance   Grooming Deficit Verbal cueing; Increased time to complete; Teeth care;Brushing hair   UB Dressing Assistance 3  Moderate Assistance   UB Dressing Deficit Verbal cueing; Increased time to complete; Thread RUE; Thread LUE;Pull around back;Pull down in back   Bed Mobility   Supine to Sit Unable to assess   Sit to Supine Unable to assess   Transfers   Sit to Stand 3  Moderate assistance   Additional items Assist x 2; Increased time required;Verbal cues   Stand to Sit 3  Moderate assistance   Additional items Assist x 2; Increased time required;Verbal cues   Stand pivot 3  Moderate assistance   Functional Mobility   Functional Mobility 3  Moderate assistance   Additional items Rolling walker   Subjective   Subjective Pt. stated something about "the delay".    Cognition   Overall Cognitive Status Impaired Arousal/Participation Alert; Cooperative   Attention Attends with cues to redirect   Orientation Level Unable to assess   Memory Decreased short term memory;Decreased recall of recent events   Following Commands Follows one step commands with increased time or repetition   Activity Tolerance   Activity Tolerance Patient tolerated treatment well   Assessment   Assessment Pt. participated in a skilled OT session focusing on activity tolerance, standing and sitting dynamic balance, strength, coordination, and energy conservation techniques for increased participation in ADLs and self-care tasks. Pt. found seated in chair awake and alert. Pt. cognitive status was noted to be impaired this session as she required verbal cues to initiate tasks but presented pleasant and cooperative throughout the therapy session. Pt. performed functional transfers with assist X2 *2 decreased strength. Pt. performed grooming / hygiene tasks with assist 2* coordination deficits. Pt. did not state her therapy goal at this time 2* aphasia. Pt will benefit from continued OT services for ADL retraining, endurance training, and transfer training. OT recommends post acute rehab services upon discharge to continue to address the deficits noted above which currently impair pt. ADL and occupational performance. Upon conclusion of OT session pt. returned seated in chair with activated chair alarm and all needs within reach. Plan   Treatment Interventions ADL retraining;Functional transfer training;UE strengthening/ROM; Endurance training;Energy conservation; Activityengagement   Goal Expiration Date 09/14/23   OT Frequency 2-3x/wk   Recommendation   OT Discharge Recommendation Post acute rehabilitation services   AM-PAC Daily Activity Inpatient   Lower Body Dressing 2   Bathing 2   Toileting 2   Upper Body Dressing 2   Grooming 3   Eating 3   Daily Activity Raw Score 14   Daily Activity Standardized Score (Calc for Raw Score >=11) 33.39   AM-PAC Applied Cognition Inpatient   Following a Speech/Presentation 2   Understanding Ordinary Conversation 3   Taking Medications 2   Remembering Where Things Are Placed or Put Away 2   Remembering List of 4-5 Errands 2   Taking Care of Complicated Tasks 2   Applied Cognition Raw Score 13   Applied Cognition Standardized Score 30.46   Barthel Index   Grooming Score 0   Dressing Score 5   Toilet Use Score 5   Transfers (Bed/Chair) Score 10     Evita HA

## 2023-09-07 NOTE — WOUND OSTOMY CARE
Consult Note - Wound   Papito Celestin 70 y.o. female MRN: [id number]  Unit/Bed#: OR POOL Encounter: [id number]      Assessment Findings:   Patient seen today for wound care consult for sacral/buttocks wound. Sacral/buttocks with intact scabs and blanchable hyperpigmentation, applied hydraguard. B/L heels intact and blanching. Orders listed below and wound care will sign off, call or tiger text with questions. Bedside nurse updated of findings and orders. Flowsheets below with pictures and measurements. Skin care plans:  1-Hydraguard to bilateral sacrum, buttock and heels BID and PRN  2-Elevate heels to offload pressure. 3-Ehob cushion in chair when out of bed. 4-Moisturize skin daily with skin nourishing cream.  5-Turn/reposition q2h or when medically stable for pressure re-distribution on skin. Wounds:  Wound 08/23/23 Pressure Injury Buttocks Bilateral (Active)   Wound Image   09/07/23 0922   Wound Description Beefy red;Epithelialization;Fragile 09/07/23 0922   Pressure Injury Stage 2 09/04/23 0800   Miley-wound Assessment Fragile; Hyperpigmented 09/07/23 0922   Wound Length (cm) 0 cm 09/07/23 0922   Wound Width (cm) 0 cm 09/07/23 0922   Wound Depth (cm) 0 cm 09/07/23 0922   Wound Surface Area (cm^2) 0 cm^2 09/07/23 0922   Wound Volume (cm^3) 0 cm^3 09/07/23 0922   Calculated Wound Volume (cm^3) 0 cm^3 09/07/23 0922   Tunneling 0 cm 09/07/23 0922   Tunneling in depth located at 0 09/07/23 0922   Undermining 0 09/07/23 0922   Undermining is depth extending from 0 09/07/23 0922   Wound Site Closure MARIS 09/07/23 0922   Drainage Amount None 09/07/23 0922   Non-staged Wound Description Partial thickness 09/07/23 0922   Treatments Cleansed 09/07/23 0922   Dressing Moisture barrier 09/07/23 0922   Wound packed?  No 09/07/23 0922   Packing- # removed 0 09/07/23 0922   Packing- # inserted 0 09/07/23 0922   Dressing Changed New 09/07/23 0922   Patient Tolerance Tolerated well 09/07/23 0922   Dressing Status Clean;Dry; Intact 09/07/23 3553           Marquita Verdugo BSN, RN, [facility]

## 2023-09-07 NOTE — PROGRESS NOTES
NEUROLOGY RESIDENCY PROGRESS NOTE     Name: Alisson Abarca   Age & Sex: 70 y.o. female   MRN: [id number]  Unit/Bed#: [facility] 710-01   Encounter: [id number]    Outpatient follow-up with neurovascular attending/REAGAN/resident in 6 to 8 weeks. No further outpatient neurological testing indicated at this time. Pending for discharge: L TCAR on 9/7    ASSESSMENT & PLAN     * Acute CVA (cerebrovascular accident) [facility])  Assessment & Plan  68YO R-handed female with history of HTN, HLD, CKD, recent acute CVA of L occipital lobe and L MCA territory with residual expressive aphasia and R hemianopia (8/15/2023), advanced atherosclerotic disease affecting multiple neurovascular structures who presented as a stroke alert on 9/3/2023 for acute RLE and RUE weakness. This occurred while she was working with PT at [employer]. Denied headache, dizziness, numbness/tingling. Initial NIHSS 8 - inability to accurately state month and age, R hemianopia, RLE weakness, RLE decreased sensation, moderate expressive aphasia. Patient has been on ASA/Brilinta since 7/29/2023 for CVA. No TNK administered given complete resolution of acute symptoms. CTH showed stable subacute infarcts within the L occipital and parietal lobes. CTA showed stable advanced atherosclerotic disease throughout cervical and intracranial vasculature with multiple areas of severe stenosis most prominent within the ICA bulb bilaterally; no significant change compared with prior examination. Patient was recently scheduled for TCAR with vascular surgery but this was canceled due to elevated A1c level. Admitted on stroke pathway. MRI brain concerning for acute stroke, etiology most likely associated with severe ICA stenosis. Vascular surgery team consulted for intervention. Prior work-up:  -Echo, 8/18/23: EF 62%. G1DD, LA normal size, no major valvular dysfunction. -MRI brain wo contrast, 9/5/23: 1.  Scattered multifocal infarctions predominantly in the left occipital lobe as well as in the left frontal and parietal lobes in the watershed territory, slightly increased in size and number from the prior study indicative of trace crescendo   infarctions/progressive infarctions associated with previously present infarctions in these region. 2. Trace T1 high signal in the left occipital lobe is worrisome for petechial microhemorrhage. No significant mass effect. Recommend follow-up repeat head CT in 24 hours to assess for stability. 3. Moderate, chronic microangiopathy. Lab Results   Component Value Date    HGBA1C 7.3 (H) 09/03/2023    LDLCALC 59 09/03/2023       Impression: MRI brain demonstrated multifocal acute infarcts in the left occipital, left frontal, left parietal regions and watershed territories. Etiology suspected to be secondary to hypoperfusion in the setting of severe ICA stenosis. Plan, Stroke Pathway:  · Discussed with attending Dr. Nena Trujillo  · Continue home ASA/Brilinta  · Continue home Pravastatin 80mg daily with dinner  · SBP <160 but low threshold for further fluid boluses if symptomatic with lower SBPs. Recommend normotensive goal after TCAR procedure. · Plan for L TCAR with vascular surgery team today. Will defer antiplatelet plan to vascular surgery team. Currently on ASA/Brilinta, original plan for 90 days, followed by ASA monotherapy. · Given concerns for poststroke depression/anxiety, started Lexapro 10 mg daily. · Normoglycemia, normothermia. · Monitor on telemetry  · Bedside swallow eval  · Stat CT Head for change in neuro status. · DVT ppx, SCDs  • PT/OT/PM&R evaluations   • Medical management as per primary team appreciated. SUBJECTIVE     Patient was seen and examined. No acute events overnight. Patient reports her strength is normal and she has no further acute concerns at this time. She is nervous about the vascular surgery. But appears hopeful.     Review of Systems   Constitutional: Negative for appetite change, chills, diaphoresis, fatigue and fever. HENT: Negative for trouble swallowing. Eyes: Negative for visual disturbance. Gastrointestinal: Negative for nausea and vomiting. Genitourinary: Negative for difficulty urinating. Musculoskeletal: Negative for back pain. Neurological: Positive for speech difficulty. Negative for dizziness, weakness, numbness and headaches. Psychiatric/Behavioral: The patient is nervous/anxious. All other systems reviewed and are negative. OBJECTIVE     Patient ID: Precious Neff is a 70 y.o. female. Vitals:    23 1445 23 1736 23 2221 23 0726   BP: 147/63 146/71 151/70 153/71   Pulse: 65 68 73 67   Resp:    18   Temp:    97.8 °F (36.6 °C)   TempSrc:       SpO2:  98%  96%   Weight:       Height:          Temperature:   Temp (24hrs), Av.8 °F (36.6 °C), Min:97.8 °F (36.6 °C), Max:97.8 °F (36.6 °C)    Temperature: 97.8 °F (36.6 °C)      Physical Exam  Vitals reviewed. Constitutional:       General: She is not in acute distress. Appearance: She is not ill-appearing, toxic-appearing or diaphoretic. HENT:      Head: Normocephalic and atraumatic. Right Ear: External ear normal.      Left Ear: External ear normal.      Nose: Nose normal.      Mouth/Throat:      Mouth: Mucous membranes are moist.   Eyes:      General:         Right eye: No discharge. Left eye: No discharge. Extraocular Movements: Extraocular movements intact and EOM normal.      Conjunctiva/sclera: Conjunctivae normal.      Pupils: Pupils are equal, round, and reactive to light. Cardiovascular:      Rate and Rhythm: Normal rate. Pulmonary:      Effort: Pulmonary effort is normal. No respiratory distress. Musculoskeletal:      Right lower leg: No edema. Left lower leg: No edema. Skin:     General: Skin is warm and dry. Neurological:      Mental Status: She is alert and oriented to person, place, and time.  Mental status is at baseline. Cranial Nerves: Cranial nerves 2-12 are intact. Sensory: No sensory deficit. Motor: Motor strength is normal.     Coordination: Finger-Nose-Finger Test normal.      Deep Tendon Reflexes:      Reflex Scores:       Tricep reflexes are 1+ on the right side and 1+ on the left side. Bicep reflexes are 1+ on the right side and 1+ on the left side. Brachioradialis reflexes are 1+ on the right side and 1+ on the left side. Patellar reflexes are 1+ on the right side and 1+ on the left side. Achilles reflexes are 1+ on the right side and 1+ on the left side. Neurologic Exam     Mental Status   Oriented to person, place, and time. Speech: (Mixed expressive > receptive aphasia)  Able to name object. Able to repeat. Abnormal comprehension. Cranial Nerves   Cranial nerves II through XII intact. CN II   Visual fields full to confrontation. CN III, IV, VI   Pupils are equal, round, and reactive to light. Extraocular motions are normal.     CN V   Facial sensation intact. CN VII   Facial expression full, symmetric. CN VIII   CN VIII normal.     CN IX, X   CN IX normal.   CN X normal.     CN XI   CN XI normal.     CN XII   CN XII normal.     Motor Exam   Muscle bulk: normal  Overall muscle tone: normal    Strength   Strength 5/5 throughout. Sensory Exam   Light touch normal.     Gait, Coordination, and Reflexes     Coordination   Finger to nose coordination: normal    Reflexes   Right brachioradialis: 1+  Left brachioradialis: 1+  Right biceps: 1+  Left biceps: 1+  Right triceps: 1+  Left triceps: 1+  Right patellar: 1+  Left patellar: 1+  Right achilles: 1+  Left achilles: 1+           LABORATORY DATA     Labs: I have personally reviewed pertinent reports.     Results from last 7 days   Lab Units 09/06/23  0511 09/04/23  0528 09/03/23  1804 09/03/23  1314   WBC Thousand/uL 8.27 7.13  --  6.70   HEMOGLOBIN g/dL 12.4 13.2  --  11.7   HEMATOCRIT % 35.6 37. 4  --  34.7*   PLATELETS Thousands/uL 282 274 287 267   NEUTROS PCT %  --  60  --   --    MONOS PCT %  --  12  --   --    EOS PCT %  --  5  --   --       Results from last 7 days   Lab Units 09/07/23  0459 09/06/23  0511 09/04/23  0528   SODIUM mmol/L 138 134* 136   POTASSIUM mmol/L 4.3 4.3 4.1   CHLORIDE mmol/L 103 102 104   CO2 mmol/L 19* 23 21   BUN mg/dL 34* 47* 51*   CREATININE mg/dL 1.67* 2.26* 1.73*   CALCIUM mg/dL 6.9* 9.7 9.8              Results from last 7 days   Lab Units 09/03/23  1314   INR  0.98   PTT seconds 34               IMAGING & DIAGNOSTIC TESTING     Radiology Results: I have personally reviewed pertinent reports. and I have personally reviewed pertinent films in PACS    US kidney and bladder   Final Result by Thelma Weaver MD (09/06 2216)      No hydronephrosis. Cystic lesion in the right kidney which appears to have a thickened, perceptible wall. Recommend further evaluation of this finding with either a contrast-enhanced CT scan or MRI using renal mass protocol when the patient's renal function recovers. Workstation performed: [id number]         CT head wo contrast   Final Result by Denys Paniagua MD (09/06 1616)      Known smaller acute and subacute infarcts throughout the left cerebral hemisphere involving left frontal, left parietal, and left temporal lobes are better evaluated on MRI brain without contrast 9/5/2023 given background moderate-to-severe chronic    microangiopathy. Chronic infarct in left medial parieto-occipital lobe with slightly increased cortical density, which may be due to cortical laminar necrosis over petechial cortical hemorrhage. No new acute intracranial abnormality. Workstation performed: [id number]         MRI brain wo contrast   Final Result by Demetria Washington MD (09/05 1825)         1.  Scattered multifocal infarctions predominantly in the left occipital lobe as well as in the left frontal and parietal lobes in the watershed territory, slightly increased in size and number from the prior study indicative of trace crescendo    infarctions/progressive infarctions associated with previously present infarctions in these region. 2. Trace T1 high signal in the left occipital lobe is worrisome for petechial microhemorrhage. No significant mass effect. Recommend follow-up repeat head CT in 24 hours to assess for stability. 3. Moderate, chronic microangiopathy. Workstation performed: [id number]         XR spine lumbar 2 or 3 views injury   Final Result by Rufino Bragg MD (09/05 1237)      Transitional lumbosacral anatomy   Degenerative disease seen at the L2-3   Degenerative disease seen at L4-5   Sacralization of the L5 vertebra   No acute compression collapse of the vetebra      Workstation performed: [id number]         CTA stroke alert (head/neck)   Final Result by Dada Arellano DO (09/03 1325)      Stable advanced atherosclerotic disease throughout the cervical and intracranial vasculature with multiple areas of severe stenosis most prominent within the internal carotid artery bulb bilaterally. Advanced atherosclerotic disease of the distal left vertebral artery with occlusion proximal to the vertebrobasilar junction and severe high-grade stenosis of the proximal basilar. Additional areas of moderate to advanced atherosclerotic disease    scattered throughout the cervical and intracranial vasculature. Overall no significant change when compared with the prior examination. Findings were directly discussed with Elvia Quesada  at 1:20 p.m. Workstation performed: [id number]         CT stroke alert brain   Final Result by Dada Arellano DO (09/03 1326)      Stable subacute infarcts within the left occipital and parietal lobes. No mass effect or hemorrhagic transformation.       Additional moderate periventricular white matter change consistent with chronic microangiopathy. No hemorrhage. Findings were directly discussed with Elvia Quesada  at 1:20 p.m. Workstation performed: [id number]             Other Diagnostic Testing: I have personally reviewed pertinent reports.       ACTIVE MEDICATIONS     Current Facility-Administered Medications   Medication Dose Route Frequency   • acetaminophen (TYLENOL) tablet 650 mg  650 mg Oral Q6H PRN   • allopurinol (ZYLOPRIM) tablet 100 mg  100 mg Oral Daily   • aluminum-magnesium hydroxide-simethicone (MAALOX) oral suspension 30 mL  30 mL Oral Q6H PRN   • amLODIPine (NORVASC) tablet 5 mg  5 mg Oral Daily   • aspirin chewable tablet 81 mg  81 mg Oral Daily   • chlorhexidine (PERIDEX) 0.12 % oral rinse 15 mL  15 mL Swish & Spit Once   • cinacalcet (SENSIPAR) tablet 30 mg  30 mg Oral Every Other Day   • [START ON 9/9/2023] cloNIDine (CATAPRES-TTS-3) 0.3 mg/24 hr TD weekly patch  1 patch Transdermal Weekly   • escitalopram (LEXAPRO) tablet 10 mg  10 mg Oral Daily   • heparin (porcine) subcutaneous injection 5,000 Units  5,000 Units Subcutaneous Q8H [facility]   • insulin glargine (LANTUS) subcutaneous injection 10 Units 0.1 mL  10 Units Subcutaneous HS   • insulin lispro (HumaLOG) 100 units/mL subcutaneous injection 1-5 Units  1-5 Units Subcutaneous TID AC   • insulin lispro (HumaLOG) 100 units/mL subcutaneous injection 3 Units  3 Units Subcutaneous TID With Meals   • labetalol (NORMODYNE) injection 10 mg  10 mg Intravenous Q10 Min PRN   • labetalol (NORMODYNE) tablet 300 mg  300 mg Oral Q12H JAVIER   • lidocaine (LIDODERM) 5 % patch 2 patch  2 patch Topical Daily   • multi-electrolyte (PLASMALYTE-A/ISOLYTE-S PH 7.4) IV solution  50 mL/hr Intravenous Continuous   • ondansetron (ZOFRAN) injection 4 mg  4 mg Intravenous Q6H PRN   • polyethylene glycol (MIRALAX) packet 17 g  17 g Oral Daily   • pravastatin (PRAVACHOL) tablet 80 mg  80 mg Oral Daily With Dinner   • ticagrelor (BRILINTA) tablet 90 mg  90 mg Oral Q12H [address]   • traZODone (DESYREL) tablet 50 mg  50 mg Oral HS       Prior to Admission medications    Medication Sig Start Date End Date Taking?  Authorizing Provider   acetaminophen (TYLENOL) 500 mg tablet Take 650 mg by mouth every 6 (six) hours as needed for mild pain   Yes Historical Provider, MD   allopurinol (ZYLOPRIM) 100 mg tablet TAKE 1 TABLET BY MOUTH EVERY DAY 1/23/23  Yes Haley Allen MD   amLODIPine (NORVASC) 10 mg tablet Take 1 tablet (10 mg total) by mouth daily 5/15/23  Yes Hero Tripp MD   aspirin 81 mg chewable tablet Chew 81 mg daily   Yes Historical Provider, MD   cinacalcet (SENSIPAR) 30 mg tablet Take 1 tablet (30 mg total) by mouth every other day 8/7/23 5/3/24 Yes Haley Allen MD   cloNIDine (CATAPRES-TTS-3) 0.3 mg/24 hr PLACE 1 PATCH (0.3 MG TOTAL) ON THE SKIN ONCE A WEEK 5/1/23  Yes Hero Tripp MD   Heparin Sodium, Porcine, (heparin, porcine,) 5,000 units/mL Inject 5,000 Units under the skin every 8 (eight) hours   Yes Historical Provider, MD   insulin lispro (HumaLOG) 100 units/mL injection Inject under the skin   Yes Historical Provider, MD   lidocaine (LIDODERM) 5 % Apply 1 patch topically daily Remove & Discard patch within 12 hours or as directed by MD   Yes Historical Provider, MD   pravastatin (PRAVACHOL) 80 mg tablet Take 1 tablet (80 mg total) by mouth daily with dinner 8/19/23  Yes Phuong Flores MD   ticagror [facility]) 90 MG Take 1 tablet (90 mg total) by mouth every 12 (twelve) hours 8/19/23 9/18/23 Yes Phuong Flores MD   traZODone (DESYREL) 50 mg tablet Take 1 tablet (50 mg total) by mouth daily at bedtime 8/19/23  Yes Phuong Flores MD   glipiZIDE (GLUCOTROL) 5 mg tablet TAKE 1 TABLET BY MOUTH EVERY DAY WITH BREAKFAST 9/5/23   Hero Tripp MD   Glucagon 1 MG/0.2ML SOAJ Inject 1 mg under the skin if needed (low blood sugar)    Historical Provider, MD   labetalol (NORMODYNE) 300 mg tablet Take 1 tablet (300 mg total) by mouth 2 (two) times a day  Patient taking differently: Take 400 mg by mouth 2 (two) times a day 5/15/23   Antoine Orozco MD   losartan (COZAAR) 100 MG tablet Take 1 tablet (100 mg total) by mouth daily 7/17/23   Antoine Orozco MD         VTE Pharmacologic Prophylaxis: Heparin  VTE Mechanical Prophylaxis: sequential compression device    ======    I have discussed the patient's history, physical exam findings, assessment, and plan in detail with attending, Dr. Anna Emerson    Thank you for allowing me to participate in the care of your patient, Tristan Henley.     DO Juan Nguyen [facility], PGY-2

## 2023-09-07 NOTE — PROGRESS NOTES
[facility] [place] [facility]   Ev Proper 70 y.o. female MRN: [id number]  Unit/Bed#: [facility] 710-01 Encounter: [id number]  Reason for Consult: RICARDO     ASSESSMENT and PLAN:    1. Acute kidney injury. Baseline creatinine appears to be around 1.5-1.8. Creatinine on admission 1.19, creatinine on consultation 2.2609/06, creatinine today 1.6. Etiology of RICARDO most likely ATN due to contrast associated nephropathy for contrast administered 09/03. Urinalysis shows 2+ protein, 2-4 RBC, innumerable WBC and moderate bacteria without symptoms. Kidney ultrasound with left renal atrophy, no hydronephrosis. Kidney function currently improved with holding losartan and giving IV fluids. She is going for vascular surgery procedure that would involve administration of iodinated contrast.  I have discussed the risk with the patient and she is aware of the risk of RICARDO with need for dialysis and worst-case scenario. She is clear that she does not want dialysis. We will continue hydration with Isolyte 50 cc/h before the procedure and 12 hours after the procedure. Trend BMP. 2. Cystic lesion in right kidney. She needs dedicated imaging for this and can be done at a later date. Would recommend doing MRI with [facility].    3. Hypertension. Currently on amlodipine 5 mg daily, clonidine 0.3 mg patch, labetalol 300 mg twice daily. Losartan has been on hold. If further blood pressure control is required can increase amlodipine to 10 mg daily. 4. Mild hyponatremia. This was in setting of acute kidney injury which is currently resolved. 5. 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 Petechial microhemorrhage in left occipital lobe. Moderate chronic microangiopathy. Bilateral carotid artery stenosis in setting of left CVA. She is scheduled for left carotid endarterectomy today.     Discussed with internal medicine team.  After discussion, we agreed that kidney function is back to baseline and to continue with gentle hydration before and 12 hours after the procedure. SUBJECTIVE / 24H INTERVAL HISTORY:  Patient is n.p.o. this morning for vascular surgery procedure. Denies dyspnea. Denies leg swelling. Review of Systems   Constitutional: Negative for chills and fever. HENT: Negative for ear pain and sore throat. Eyes: Negative for pain and visual disturbance. Respiratory: Negative for cough and shortness of breath. Cardiovascular: Negative for chest pain and palpitations. Gastrointestinal: Negative for abdominal pain and vomiting. Genitourinary: Negative for dysuria and hematuria. Musculoskeletal: Negative for arthralgias and back pain. Skin: Negative for color change and rash. Neurological: Negative for seizures and syncope. All other systems reviewed and are negative. OBJECTIVE:  Current Weight: Weight - Scale: 71.9 kg (158 lb 6.4 oz)  Vitals:    09/06/23 1445 09/06/23 1736 09/06/23 2221 09/07/23 0726   BP: 147/63 146/71 151/70 153/71   Pulse: 65 68 73 67   Resp:    18   Temp:    97.8 °F (36.6 °C)   TempSrc:       SpO2:  98%  96%   Weight:       Height:           Intake/Output Summary (Last 24 hours) at 9/7/2023 0853  Last data filed at 9/7/2023 0103  Gross per 24 hour   Intake --   Output 250 ml   Net -250 ml     Physical Exam  Vitals and nursing note reviewed. Constitutional:       General: She is not in acute distress. Appearance: She is well-developed. HENT:      Head: Normocephalic and atraumatic. Eyes:      Conjunctiva/sclera: Conjunctivae normal.   Cardiovascular:      Rate and Rhythm: Normal rate and regular rhythm. Pulses: Normal pulses. Heart sounds: Normal heart sounds. No murmur heard. Pulmonary:      Effort: Pulmonary effort is normal. No respiratory distress. Breath sounds: Normal breath sounds. Abdominal:      Palpations: Abdomen is soft. Tenderness: There is no abdominal tenderness. Musculoskeletal:         General: No swelling. Cervical back: Neck supple. Right lower leg: No edema. Left lower leg: No edema. Skin:     General: Skin is warm and dry. Capillary Refill: Capillary refill takes less than 2 seconds. Neurological:      Mental Status: She is alert.    Psychiatric:         Mood and Affect: Mood normal.         Medications:    Current Facility-Administered Medications:   •  acetaminophen (TYLENOL) tablet 650 mg, 650 mg, Oral, Q6H PRN, Meaghan Medina PA-C, 650 mg at 09/05/23 0248  •  allopurinol (ZYLOPRIM) tablet 100 mg, 100 mg, Oral, Daily, Zeus Rueda MD, 100 mg at 09/06/23 0856  •  aluminum-magnesium hydroxide-simethicone (MAALOX) oral suspension 30 mL, 30 mL, Oral, Q6H PRN, Zeus Rueda MD  •  amLODIPine (NORVASC) tablet 5 mg, 5 mg, Oral, Daily, Jr King DO, 5 mg at 09/06/23 4884  •  aspirin chewable tablet 81 mg, 81 mg, Oral, Daily, Maria Elena Hockey, DO, 81 mg at 09/06/23 0856  •  chlorhexidine (PERIDEX) 0.12 % oral rinse 15 mL, 15 mL, Swish & Spit, Once, Nereida Johnson PA-C  •  cinacalcet (SENSIPAR) tablet 30 mg, 30 mg, Oral, Every Other Day, Zeus Rueda MD, 30 mg at 09/06/23 0855  •  [START ON 9/9/2023] cloNIDine (CATAPRES-TTS-3) 0.3 mg/24 hr TD weekly patch, 1 patch, Transdermal, Weekly, Zeus Rueda MD  •  escitalopram (LEXAPRO) tablet 10 mg, 10 mg, Oral, Daily, Maria Elena Hockey, DO, 10 mg at 09/06/23 0856  •  heparin (porcine) subcutaneous injection 5,000 Units, 5,000 Units, Subcutaneous, Q8H [address], 5,000 Units at 09/06/23 2220 **AND** [COMPLETED] Platelet count, , , Once, Zeus Rueda MD  •  insulin glargine (LANTUS) subcutaneous injection 10 Units 0.1 mL, 10 Units, Subcutaneous, HS, Jr King DO, 10 Units at 09/06/23 2221  •  insulin lispro (HumaLOG) 100 units/mL subcutaneous injection 1-5 Units, 1-5 Units, Subcutaneous, TID AC **AND** Fingerstick Glucose (POCT), , , TID AC, Rebecca Calderon, DO  •  insulin lispro (HumaLOG) 100 units/mL subcutaneous injection 3 Units, 3 Units, Subcutaneous, TID With Meals, Escobar Bal MD, 3 Units at 09/06/23 1730  •  labetalol (NORMODYNE) injection 10 mg, 10 mg, Intravenous, Q10 Min PRN, [facility]  •  labetalol (NORMODYNE) tablet 300 mg, 300 mg, Oral, Q12H [address], An Clore, DO, 300 mg at 09/06/23 2221  •  lidocaine (LIDODERM) 5 % patch 2 patch, 2 patch, Topical, Daily, [facility], 2 patch at 09/06/23 0856  •  multi-electrolyte (PLASMALYTE-A/ISOLYTE-S PH 7.4) IV solution, 50 mL/hr, Intravenous, Continuous, Olu White MD, Last Rate: 50 mL/hr at 09/06/23 1343, 50 mL/hr at 09/06/23 1343  •  ondansetron (ZOFRAN) injection 4 mg, 4 mg, Intravenous, Q6H PRN, Escobar Bal MD  •  polyethylene glycol (MIRALAX) packet 17 g, 17 g, Oral, Daily, Escobar Bal MD, 17 g at 09/06/23 3841  •  pravastatin (PRAVACHOL) tablet 80 mg, 80 mg, Oral, Daily With Dar Zee DO, 80 mg at 09/06/23 1730  •  ticagrelor (BRILINTA) tablet 90 mg, 90 mg, Oral, Q12H [address], [facility], , 90 mg at 09/06/23 2226  •  traZODone (DESYREL) tablet 50 mg, 50 mg, Oral, HS, Escobar Bal MD, 50 mg at 09/06/23 2222    Laboratory Results:  Results from last 7 days   Lab Units 09/07/23  0459 09/06/23  0511 09/04/23  0528 09/03/23  1804 09/03/23  1314   WBC Thousand/uL  --  8.27 7.13  --  6.70   HEMOGLOBIN g/dL  --  12.4 13.2  --  11.7   HEMATOCRIT %  --  35.6 37.4  --  34.7*   PLATELETS Thousands/uL  --  282 274 287 267   POTASSIUM mmol/L 4.3 4.3 4.1  --  4.2   CHLORIDE mmol/L 103 102 104  --  100   CO2 mmol/L 19* 23 21  --  24   BUN mg/dL 34* 47* 51*  --  65*   CREATININE mg/dL 1.67* 2.26* 1.73*  --  1.93*   CALCIUM mg/dL 6.9* 9.7 9.8  --  8.9     Portions of the record may have been created with voice recognition software. Occasional wrong word or "sound a like" substitutions may have occurred due to the inherent limitations of voice recognition software. Read the chart carefully and recognize, using context, where substitutions have occurred. If you have any questions, please contact the dictating provider.

## 2023-09-07 NOTE — DISCHARGE INSTR - AVS FIRST PAGE
DISCHARGE INSTRUCTIONS  CAROTID ARTERIOGRAM/STENT    ACTIVITY:  Limit your physical activity to walking for the first week and then increase your activity as tolerated. Walking up steps and normal activities may be resumed as you feel ready. Avoid strenuous activity such as vigorous exercise. Avoid heavy lifting (do not lift more than 15 pounds) for the first four weeks after surgery. You should not drive a car for at least one week following discharge from the hospital and you are off all narcotic pain medication. You may ride in a car. If you have had a stroke or mini-stroke, please consult with your neurologist prior to driving. DO NOT START OR RESUME ANY PREVIOUSLY PLANNED THERAPY (PHYSICAL, CARDIAC, REHAB, ETC…) UNTIL YOU DISCUSS WITH YOUR SURGEON AND THEY FEEL IT IS SAFE TO ENGAGE IN THERAPY. DIET:  Resume your normal diet. Good nutrition is important for healing of your incision. Drink more water than usual for the next 24 hours. You can expect to have a sore throat and trouble swallowing after surgery which should improve quickly. If you feel like you are choking, please call your doctor. RECURRENT SYMPTOMS: If you develop any new numbness, weakness, vision changes, drooping of the face, or difficulty with speech after discharge, CALL 911 or go to the nearest Emergency department immediately. INCISION/PROCEDURE SITE: You have an incision site at your neck and a procedure site in your groin. You may have surgical glue at these sites. There are stitches present under the skin which will absorb on their own. The glue is used to cover the access, assist in closure, and prevent contamination. This adhesive will darken and peel away on its own within one to two weeks. Do not pick at it. If you have a bandage over either site, please remove this on the second day after surgery. You should shower daily.   Wash incision daily with soap and water, but do not rub or scrub the incision; rinse thoroughly and pat dry. Do not bathe in a tub or swim for the first 4 weeks following surgery or if you have any open wounds. It is normal to have some bruising, swelling or discoloration around the incision. IF increasing redness, pain, bleeding or a bulge develops, call our office immediately. If you notice any active bleeding at the site, apply pressure to the site and call our office ([phone]) or [phone]. FOLLOW UP STUDIES:  Doppler ultrasound studies are very important to your post-procedure care. Your Physician will arrange for them at your first post-procedure visit. The first study will be 6 weeks after surgery. FOLLOW UP APPOINTMENTS:  Making and keeping follow up appointments and ultrasound tests are important to your recovery. If you have difficulty making it to or keeping your follow up appointments, call the office. If you have increased pain, fever >101.5, increased drainage, redness or a bad smell at your surgery site, new coldness/numbness of your arm or leg, please call us immediately and GO directly to the ER. Appt w/ GRACIELA Dewey: 9/21/2023 at 1:30pm, [place] ([place] office      PLEASE CALL THE OFFICE IF [place]  [phone]  [phone]  [address] [facility] [address], [place] ([place]), [address] [facility]  [address] [facility],[address], [address] [facility] [address]  [address], [facility], [address]  [address] [facility], [address] [facility], [address]  [address] [facility],Sixth Floor, 1st Floor, [place], [address]  [address], [address], [address], [place], [address] [facility] [address]  [address], [address], [place] ([place]), [address] [facility]  [address] [facility], [address] [facility], [address] [place] [address] [address]  [address], [place]  [address] [place] [address] [facility]

## 2023-09-08 ENCOUNTER — DOCUMENTATION (OUTPATIENT)
Dept: VASCULAR SURGERY | Facility: CLINIC | Age: 78
End: 2023-09-08

## 2023-09-08 PROBLEM — N17.9 ACUTE RENAL FAILURE SUPERIMPOSED ON STAGE 3 CHRONIC KIDNEY DISEASE (HCC): Status: ACTIVE | Noted: 2017-12-04

## 2023-09-08 LAB
ANION GAP SERPL CALCULATED.3IONS-SCNC: 10 MMOL/L
APTT PPP: 20 SECONDS (ref 23–37)
BUN SERPL-MCNC: 30 MG/DL (ref 5–25)
CALCIUM SERPL-MCNC: 8.3 MG/DL (ref 8.4–10.2)
CHLORIDE SERPL-SCNC: 108 MMOL/L (ref 96–108)
CO2 SERPL-SCNC: 19 MMOL/L (ref 21–32)
CREAT SERPL-MCNC: 1.66 MG/DL (ref 0.6–1.3)
ERYTHROCYTE [DISTWIDTH] IN BLOOD BY AUTOMATED COUNT: 15.8 % (ref 11.6–15.1)
GFR SERPL CREATININE-BSD FRML MDRD: 29 ML/MIN/1.73SQ M
GLUCOSE SERPL-MCNC: 105 MG/DL (ref 65–140)
GLUCOSE SERPL-MCNC: 113 MG/DL (ref 65–140)
GLUCOSE SERPL-MCNC: 116 MG/DL (ref 65–140)
GLUCOSE SERPL-MCNC: 128 MG/DL (ref 65–140)
GLUCOSE SERPL-MCNC: 132 MG/DL (ref 65–140)
HCT VFR BLD AUTO: 32.3 % (ref 34.8–46.1)
HGB BLD-MCNC: 11.1 G/DL (ref 11.5–15.4)
INR PPP: 0.96 (ref 0.84–1.19)
MCH RBC QN AUTO: 32.1 PG (ref 26.8–34.3)
MCHC RBC AUTO-ENTMCNC: 34.4 G/DL (ref 31.4–37.4)
MCV RBC AUTO: 93 FL (ref 82–98)
PLATELET # BLD AUTO: 228 THOUSANDS/UL (ref 149–390)
PMV BLD AUTO: 10.3 FL (ref 8.9–12.7)
POTASSIUM SERPL-SCNC: 4.2 MMOL/L (ref 3.5–5.3)
PROTHROMBIN TIME: 13 SECONDS (ref 11.6–14.5)
RBC # BLD AUTO: 3.46 MILLION/UL (ref 3.81–5.12)
SODIUM SERPL-SCNC: 137 MMOL/L (ref 135–147)
WBC # BLD AUTO: 8.41 THOUSAND/UL (ref 4.31–10.16)

## 2023-09-08 PROCEDURE — 80048 BASIC METABOLIC PNL TOTAL CA: CPT | Performed by: STUDENT IN AN ORGANIZED HEALTH CARE EDUCATION/TRAINING PROGRAM

## 2023-09-08 PROCEDURE — 97530 THERAPEUTIC ACTIVITIES: CPT

## 2023-09-08 PROCEDURE — 97535 SELF CARE MNGMENT TRAINING: CPT

## 2023-09-08 PROCEDURE — 99232 SBSQ HOSP IP/OBS MODERATE 35: CPT | Performed by: INTERNAL MEDICINE

## 2023-09-08 PROCEDURE — 85027 COMPLETE CBC AUTOMATED: CPT | Performed by: STUDENT IN AN ORGANIZED HEALTH CARE EDUCATION/TRAINING PROGRAM

## 2023-09-08 PROCEDURE — 97112 NEUROMUSCULAR REEDUCATION: CPT

## 2023-09-08 PROCEDURE — 82948 REAGENT STRIP/BLOOD GLUCOSE: CPT

## 2023-09-08 PROCEDURE — 85730 THROMBOPLASTIN TIME PARTIAL: CPT | Performed by: STUDENT IN AN ORGANIZED HEALTH CARE EDUCATION/TRAINING PROGRAM

## 2023-09-08 PROCEDURE — 99024 POSTOP FOLLOW-UP VISIT: CPT | Performed by: SURGERY

## 2023-09-08 PROCEDURE — 85610 PROTHROMBIN TIME: CPT | Performed by: STUDENT IN AN ORGANIZED HEALTH CARE EDUCATION/TRAINING PROGRAM

## 2023-09-08 PROCEDURE — 99232 SBSQ HOSP IP/OBS MODERATE 35: CPT | Performed by: PSYCHIATRY & NEUROLOGY

## 2023-09-08 RX ORDER — SODIUM BICARBONATE 650 MG/1
650 TABLET ORAL
Status: DISCONTINUED | OUTPATIENT
Start: 2023-09-08 | End: 2023-09-09 | Stop reason: HOSPADM

## 2023-09-08 RX ADMIN — SODIUM BICARBONATE 650 MG TABLET 650 MG: at 11:32

## 2023-09-08 RX ADMIN — TICAGRELOR 90 MG: 90 TABLET ORAL at 08:11

## 2023-09-08 RX ADMIN — ACETAMINOPHEN 975 MG: 325 TABLET, FILM COATED ORAL at 21:27

## 2023-09-08 RX ADMIN — ACETAMINOPHEN 975 MG: 325 TABLET, FILM COATED ORAL at 13:50

## 2023-09-08 RX ADMIN — SODIUM BICARBONATE 650 MG TABLET 650 MG: at 16:40

## 2023-09-08 RX ADMIN — LABETALOL HYDROCHLORIDE 300 MG: 200 TABLET, FILM COATED ORAL at 08:09

## 2023-09-08 RX ADMIN — CINACALCET 30 MG: 30 TABLET, FILM COATED ORAL at 08:09

## 2023-09-08 RX ADMIN — HEPARIN SODIUM 5000 UNITS: 5000 INJECTION INTRAVENOUS; SUBCUTANEOUS at 05:24

## 2023-09-08 RX ADMIN — ESCITALOPRAM OXALATE 10 MG: 10 TABLET ORAL at 08:10

## 2023-09-08 RX ADMIN — HEPARIN SODIUM 5000 UNITS: 5000 INJECTION INTRAVENOUS; SUBCUTANEOUS at 22:23

## 2023-09-08 RX ADMIN — LABETALOL HYDROCHLORIDE 10 MG: 5 INJECTION, SOLUTION INTRAVENOUS at 16:40

## 2023-09-08 RX ADMIN — HEPARIN SODIUM 5000 UNITS: 5000 INJECTION INTRAVENOUS; SUBCUTANEOUS at 13:49

## 2023-09-08 RX ADMIN — TICAGRELOR 90 MG: 90 TABLET ORAL at 21:28

## 2023-09-08 RX ADMIN — LABETALOL HYDROCHLORIDE 300 MG: 200 TABLET, FILM COATED ORAL at 21:27

## 2023-09-08 RX ADMIN — ACETAMINOPHEN 975 MG: 325 TABLET, FILM COATED ORAL at 05:24

## 2023-09-08 RX ADMIN — POLYETHYLENE GLYCOL 3350 17 G: 17 POWDER, FOR SOLUTION ORAL at 08:11

## 2023-09-08 RX ADMIN — NICARDIPINE HYDROCHLORIDE 2.5 MG/HR: 25 INJECTION, SOLUTION INTRAVENOUS at 05:40

## 2023-09-08 RX ADMIN — HYDRALAZINE HYDROCHLORIDE 15 MG: 20 INJECTION, SOLUTION INTRAMUSCULAR; INTRAVENOUS at 06:56

## 2023-09-08 RX ADMIN — AMLODIPINE BESYLATE 5 MG: 5 TABLET ORAL at 08:10

## 2023-09-08 RX ADMIN — SENNOSIDES 8.6 MG: 8.6 TABLET, FILM COATED ORAL at 08:10

## 2023-09-08 RX ADMIN — PRAVASTATIN SODIUM 80 MG: 80 TABLET ORAL at 16:40

## 2023-09-08 RX ADMIN — INSULIN GLARGINE 10 UNITS: 100 INJECTION, SOLUTION SUBCUTANEOUS at 21:28

## 2023-09-08 RX ADMIN — TRAZODONE HYDROCHLORIDE 50 MG: 50 TABLET ORAL at 21:28

## 2023-09-08 RX ADMIN — ASPIRIN 81 MG CHEWABLE TABLET 81 MG: 81 TABLET CHEWABLE at 08:10

## 2023-09-08 NOTE — PROGRESS NOTES
Vascular Nurse Navigator Post Op Education    Met with patient at bedside to introduce myself as Vascular Nurse Navigator and explained my role. Patient is appropriate and accepting to education. Patient was educated with Review of written materials provided, Teachback, Explanation, Demonstration and Question & Answer on expectations of post op care and recovery on Left TCAR. Patient is a former smoker, quit 3 years ago, as such Smoking effects on the lungs, tobacco triggers and Smoking cessation was reviewed. Education provided to patient on infection prevention, activity limitations, when to call the office, importance of follow up, and incisional care. Discharge instruction handout provided to patient to review.

## 2023-09-08 NOTE — PLAN OF CARE
Problem: MOBILITY - ADULT  Goal: Maintain or return to baseline ADL function  Description: INTERVENTIONS:  -  Assess patient's ability to carry out ADLs; assess patient's baseline for ADL function and identify physical deficits which impact ability to perform ADLs (bathing, care of mouth/teeth, toileting, grooming, dressing, etc.)  - Assess/evaluate cause of self-care deficits   - Assess range of motion  - Assess patient's mobility; develop plan if impaired  - Assess patient's need for assistive devices and provide as appropriate  - Encourage maximum independence but intervene and supervise when necessary  - Involve family in performance of ADLs  - Assess for home care needs following discharge   - Consider OT consult to assist with ADL evaluation and planning for discharge  - Provide patient education as appropriate  Outcome: Progressing  Goal: Maintains/Returns to pre admission functional level  Description: INTERVENTIONS:  - Perform BMAT or MOVE assessment daily.   - Set and communicate daily mobility goal to care team and patient/family/caregiver. - Collaborate with rehabilitation services on mobility goals if consulted  - Perform Range of Motion  times a day. - Reposition patient every  hours.   - Dangle patient  times a day  - Stand patient times a day  - Ambulate patient  times a day  - Out of bed to chair  times a day   - Out of bed for meals  times a day  - Out of bed for toileting  - Record patient progress and toleration of activity level   Outcome: Progressing     Problem: PAIN - ADULT  Goal: Verbalizes/displays adequate comfort level or baseline comfort level  Description: Interventions:  - Encourage patient to monitor pain and request assistance  - Assess pain using appropriate pain scale  - Administer analgesics based on type and severity of pain and evaluate response  - Implement non-pharmacological measures as appropriate and evaluate response  - Consider cultural and social influences on pain and pain management  - Notify physician/advanced practitioner if interventions unsuccessful or patient reports new pain  Outcome: Progressing     Problem: INFECTION - ADULT  Goal: Absence or prevention of progression during hospitalization  Description: INTERVENTIONS:  - Assess and monitor for signs and symptoms of infection  - Monitor lab/diagnostic results  - Monitor all insertion sites, i.e. indwelling lines, tubes, and drains  - Monitor endotracheal if appropriate and nasal secretions for changes in amount and color  - Industry appropriate cooling/warming therapies per order  - Administer medications as ordered  - Instruct and encourage patient and family to use good hand hygiene technique  - Identify and instruct in appropriate isolation precautions for identified infection/condition  Outcome: Progressing  Goal: Absence of fever/infection during neutropenic period  Description: INTERVENTIONS:  - Monitor WBC    Outcome: Progressing     Problem: SAFETY ADULT  Goal: Maintain or return to baseline ADL function  Description: INTERVENTIONS:  -  Assess patient's ability to carry out ADLs; assess patient's baseline for ADL function and identify physical deficits which impact ability to perform ADLs (bathing, care of mouth/teeth, toileting, grooming, dressing, etc.)  - Assess/evaluate cause of self-care deficits   - Assess range of motion  - Assess patient's mobility; develop plan if impaired  - Assess patient's need for assistive devices and provide as appropriate  - Encourage maximum independence but intervene and supervise when necessary  - Involve family in performance of ADLs  - Assess for home care needs following discharge   - Consider OT consult to assist with ADL evaluation and planning for discharge  - Provide patient education as appropriate  Outcome: Progressing  Goal: Maintains/Returns to pre admission functional level  Description: INTERVENTIONS:  - Perform BMAT or MOVE assessment daily.   - Set and communicate daily mobility goal to care team and patient/family/caregiver. - Collaborate with rehabilitation services on mobility goals if consulted  - Perform Range of Motion  times a day. - Reposition patient every  hours.   - Dangle patient  times a day  - Stand patient  times a day  - Ambulate patient  times a day  - Out of bed to keyur times a day   - Out of bed for meals times a day  - Out of bed for toileting  - Record patient progress and toleration of activity level   Outcome: Progressing  Goal: Patient will remain free of falls  Description: INTERVENTIONS:  - Educate patient/family on patient safety including physical limitations  - Instruct patient to call for assistance with activity   - Consult OT/PT to assist with strengthening/mobility   - Keep Call bell within reach  - Keep bed low and locked with side rails adjusted as appropriate  - Keep care items and personal belongings within reach  - Initiate and maintain comfort rounds  - Make Fall Risk Sign visible to staff  - Offer Toileting every  Hours, in advance of need  - Initiate/Maintain alarm  - Obtain necessary fall risk management equipment:   - Apply yellow socks and bracelet for high fall risk patients  - Consider moving patient to room near nurses station  Outcome: Progressing     Problem: CARDIOVASCULAR - ADULT  Goal: Maintains optimal cardiac output and hemodynamic stability  Description: INTERVENTIONS:  - Monitor I/O, vital signs and rhythm  - Monitor for S/S and trends of decreased cardiac output  - Administer and titrate ordered vasoactive medications to optimize hemodynamic stability  - Assess quality of pulses, skin color and temperature  - Assess for signs of decreased coronary artery perfusion  - Instruct patient to report change in severity of symptoms  Outcome: Progressing     Problem: NEUROSENSORY - ADULT  Goal: Achieves stable or improved neurological status  Description: INTERVENTIONS  - Monitor and report changes in neurological status  - Monitor vital signs such as temperature, blood pressure, glucose, and any other labs ordered   - Initiate measures to prevent increased intracranial pressure  - Monitor for seizure activity and implement precautions if appropriate      Outcome: Progressing     Problem: Prexisting or High Potential for Compromised Skin Integrity  Goal: Skin integrity is maintained or improved  Description: INTERVENTIONS:  - Identify patients at risk for skin breakdown  - Assess and monitor skin integrity  - Assess and monitor nutrition and hydration status  - Monitor labs   - Assess for incontinence   - Turn and reposition patient  - Assist with mobility/ambulation  - Relieve pressure over bony prominences  - Avoid friction and shearing  - Provide appropriate hygiene as needed including keeping skin clean and dry  - Evaluate need for skin moisturizer/barrier cream  - Collaborate with interdisciplinary team   - Patient/family teaching  - Consider wound care consult   Outcome: Progressing     Problem: Knowledge Deficit  Goal: Patient/family/caregiver demonstrates understanding of disease process, treatment plan, medications, and discharge instructions  Description: Complete learning assessment and assess knowledge base.   Interventions:  - Provide teaching at level of understanding  - Provide teaching via preferred learning methods  Outcome: Progressing     Problem: DISCHARGE PLANNING  Goal: Discharge to home or other facility with appropriate resources  Description: INTERVENTIONS:  - Identify barriers to discharge w/patient and caregiver  - Arrange for needed discharge resources and transportation as appropriate  - Identify discharge learning needs (meds, wound care, etc.)  - Arrange for interpretive services to assist at discharge as needed  - Refer to Case Management Department for coordinating discharge planning if the patient needs post-hospital services based on physician/advanced practitioner order or complex needs related to functional status, cognitive ability, or social support system  Outcome: Progressing

## 2023-09-08 NOTE — ASSESSMENT & PLAN NOTE
Cystic lesion in the right kidney on renal ultrasound    Patient will need a contrast-enhanced CT scan or MRI using renal mass protocol  at some point when she is stable for further assessment

## 2023-09-08 NOTE — PLAN OF CARE
Problem: OCCUPATIONAL THERAPY ADULT  Goal: Performs self-care activities at highest level of function for planned discharge setting. See evaluation for individualized goals. Description: Treatment Interventions: ADL retraining, Functional transfer training, UE strengthening/ROM, Endurance training, Cognitive reorientation, Patient/family training, Equipment evaluation/education, Compensatory technique education, Activityengagement, Energy conservation          See flowsheet documentation for full assessment, interventions and recommendations. Outcome: Progressing  Note: Limitation: Decreased ADL status, Decreased UE strength, Decreased cognition, Decreased Safe judgement during ADL, Decreased UE ROM, Decreased endurance, Decreased fine motor control, Decreased high-level ADLs  Prognosis: Fair  Assessment: Pt seen on this date for OT session focusing on ADL retraining, cognitive reorientation, body mechanics, transfer retraining, RUE ROM, strength, coordination retraining, increasing activity tolerance/endurance to increase ability to participate in ADL/functional tasks. Pt was found in bed and was left in chair w/ all needs within reach. Pt completed bed mob and STS w mod Ax1 c HHA, FM with mod Ax2 from EOB>Chair. Stood about 10 mins during RUE coordination activities. While in stance, pt requires mod A for maintaining midline in stance, and becomes mildly retropulsive when performing dynamic standing tasks. She also requires R knee block. Pt engaged in RUE coordination task, including reaching outside of NALINI to obtain cups, then supinating/pronating R forearm to place cup across midline. Pt also engaged in cog retraining, including sequencing numbers on cups, holding cup and then transferring across midline, stacking cups in chronological order. Pt then sat in chair and engaged in grooming task of brushing teeth. Support at R elbow for successful task completion.  Pt w/ improvements in activity tolerance, transfer ability, ADL Task completion, however is still limited 2* decreased ADL/High-level ADL status, decreased activity tolerance/endurance, decreased cognition, decreased self-care trans, decreased safety awareness and insight to condition. The patient's raw score on the AM-PAC Daily Activity Inpatient Short Form is 15. A raw score of less than 19 suggests the patient may benefit from discharge to post-acute rehabilitation services. Please refer to the recommendation of the Occupational Therapist for safe discharge planning. Recommending pt D/C to STR when medically stable. Pt will continue to benefit from acute OT services to meet goals.      OT Discharge Recommendation: Post acute rehabilitation services

## 2023-09-08 NOTE — PROGRESS NOTES
[facility] [facility]  Progress Note  Name: Jarocho Rodriguez  MRN: [id number]  Unit/Bed#: PPHP 776-57 I Date of Admission: 9/3/2023   Date of Service: 9/8/2023 I Hospital Day: 5    Assessment/Plan   * Acute CVA (cerebrovascular accident) [facility])  Assessment & Plan  Patient presents with acute onset right upper and lower extremity weakness while at SNF undergoing physical therapy for recent stroke in mid-August. She was a stroke alert and was evaluated by neurology in the ED. Patient symptomatically improving at time of admission. Brain MRI with scattered multifocal infarctions predominantly in the left occipital lobe as well as in the left frontal and parietal lobes in the watershed territory, slightly increased in size and number from the prior study indicative of trace crescendo infarctions/progressive infarctions associated with previously present infarctions in these region.   · Per neurology suspect acute stroke secondary to symptomatic ICA stenosis and recommending vascular surgery evaluation   · Continue aspirin, Brilinta, pravastatin  · S/p  left TCAR on 9/ 7  · Monitor  · PT OT eval    Symptomatic carotid artery stenosis, left  Assessment & Plan  Patient with left carotid artery disease stroke  · CTA head and neck reveals extensive intracranial atherosclerotic disease stenosis extensive disease bilateral carotid arteries  · Evaluated by vascular surgery, the patient is at high risk for hemorrhagic conversion given brain MRI finding of petechial microhemorrhage  · Continue aspirin statin Brilinta  · Status post left TCAR without complication on 9/7  · Management per primary vascular  · Plan for left transcarotid artery revascularization today by vascular    Aphasia as late effect of cerebrovascular accident (CVA)  Assessment & Plan  · Supportive cares    Acute renal failure superimposed on stage 3 chronic kidney disease [facility])  Assessment & Plan  Lab Results   Component Value Date EGFR 29 09/08/2023    EGFR 29 09/07/2023    EGFR 20 09/06/2023    CREATININE 1.66 (H) 09/08/2023    CREATININE 1.67 (H) 09/07/2023    CREATININE 2.26 (H) 09/06/2023     · Nephrology is following baseline creatinine 1.5-1.8  · Stable  · DC IV fluid  · Monitor    Renal cyst  Assessment & Plan  Cystic lesion in the right kidney on renal ultrasound    Patient will need a contrast-enhanced CT scan or MRI using renal mass protocol  at some point when she is stable for further assessment    Aortoiliac occlusive disease ([address])  Assessment & Plan  · Continue aspirin statin  · Outpatient vascular surgery follow-up    Type 2 diabetes mellitus, without long-term current use of insulin [facility])  Assessment & Plan  Lab Results   Component Value Date    HGBA1C 7.3 (H) 09/03/2023       Recent Labs     09/07/23  1044 09/07/23  1532 09/07/23  2057 09/08/23  0609   POCGLU 148* 118 111 128       Blood Sugar Average: Last 72 hrs:  (P) [id number]     Uncontrolled  · Hold glipizide while inpatient  · Continue Lantus at bedtime and Humalog with meals  · Monitor Accu-Cheks and adjust as needed   · Avoid hypoglycemia  · Hypoglycemia protocol in place    Depression, recurrent (HCC)  Assessment & Plan  · Continue trazodone  · Started on Lexapro by neurology    Hyperlipidemia, unspecified  Assessment & Plan  History of myalgias to statin  · Patient on pravastatin and tolerating, continue same     Obesity (BMI 30-39. 9)  Assessment & Plan  · Therapeutic diet and lifestyle modification encouraged    Hypertension  Assessment & Plan  Initially recommended permissive hypertension per neurology   · Currently on amlodipine, clonidine patch and labetalol  · Losartan still on hold  · Monitor routinely              VTE Pharmacologic Prophylaxis: VTE Score: 11 High Risk (Score >/= 5) - Pharmacological DVT Prophylaxis Ordered: heparin. Sequential Compression Devices Ordered.     Patient Centered Rounds: I performed bedside rounds with nursing staff today.  Discussions with Specialists or Other Care Team Provider: Nephrology    Education and Discussions with Family / Patient: Patient. Total Time Spent on Date of Encounter in care of patient: 35 minutes This time was spent on one or more of the following: performing physical exam; counseling and coordination of care; obtaining or reviewing history; documenting in the medical record; reviewing/ordering tests, medications or procedures; communicating with other healthcare professionals and discussing with patient's family/caregivers. Current Length of Stay: 5 day(s)  Current Patient Status: Inpatient   Certification Statement: Per primary      Code Status: Level 3 - DNAR and DNI    Subjective:   Seen and examined  Comfortable in bed  No chest pain or shortness of breath  Status post left TCAR yesterday    Objective:     Vitals:   Temp (24hrs), Av.2 °F (36.2 °C), Min:96.9 °F (36.1 °C), Max:97.4 °F (36.3 °C)    Temp:  [96.9 °F (36.1 °C)-97.4 °F (36.3 °C)] 97.4 °F (36.3 °C)  HR:  [63-98] 63  Resp:  [12-20] 16  BP: ()/(51-89) 144/69  SpO2:  [98 %-99 %] 98 %  Body mass index is 33.11 kg/m². Input and Output Summary (last 24 hours):      Intake/Output Summary (Last 24 hours) at 2023 1139  Last data filed at 2023 1828  Gross per 24 hour   Intake 3096.17 ml   Output 1447 ml   Net 1649.17 ml       Physical Exam:   Physical Exam   Patient is awake alert no acute distress   Comfortable in chair  Baseline expressive aphasia  Lung clear to auscultation bilateral  Heart positive S1-S2 no murmur  Abdomen soft nontender  Lower extremities no edema    Additional Data:     Labs:  Results from last 7 days   Lab Units 23  0500 23  0511 23  0528   WBC Thousand/uL 8.41   < > 7.13   HEMOGLOBIN g/dL 11.1*   < > 13.2   I STAT HEMOGLOBIN   --    < >  --    HEMATOCRIT % 32.3*   < > 37.4   HEMATOCRIT, ISTAT   --    < >  --    PLATELETS Thousands/uL 228   < > 274   NEUTROS PCT %  --   --  60 LYMPHS PCT %  --   --  21   MONOS PCT %  --   --  12   EOS PCT %  --   --  5    < > = values in this interval not displayed. Results from last 7 days   Lab Units 09/08/23  0500   SODIUM mmol/L 137   POTASSIUM mmol/L 4.2   CHLORIDE mmol/L 108   CO2 mmol/L 19*   BUN mg/dL 30*   CREATININE mg/dL 1.66*   ANION GAP mmol/L 10   CALCIUM mg/dL 8.3*   GLUCOSE RANDOM mg/dL 116     Results from last 7 days   Lab Units 09/08/23  0500   INR  0.96     Results from last 7 days   Lab Units 09/08/23  1100 09/08/23  0609 09/07/23  2057 09/07/23  1532 09/07/23  1044 09/07/23  0618 09/06/23  1551 09/06/23  1124 09/06/23  0718 09/05/23  2117 09/05/23  1621 09/05/23  1224   POC GLUCOSE mg/dl 132 128 111 118 148* 102 96 148* 189* 127 78 169*     Results from last 7 days   Lab Units 09/03/23  1314   HEMOGLOBIN A1C % 7.3*           Lines/Drains:  Invasive Devices     Peripheral Intravenous Line  Duration           Peripheral IV 09/07/23 Left Wrist <1 day          Arterial Line  Duration           Arterial Line 09/07/23 Right Radial <1 day          Drain  Duration           External Urinary Catheter <1 day                      Imaging: No pertinent imaging reviewed.     Recent Cultures (last 7 days):         Last 24 Hours Medication List:   Current Facility-Administered Medications   Medication Dose Route Frequency Provider Last Rate   • acetaminophen  650 mg Oral Q6H PRN Harvey Raygoza, DO     • acetaminophen  975 mg Oral Q8H [address] Harvey Rey, DO     • aluminum-magnesium hydroxide-simethicone  30 mL Oral Q6H PRN Harvey Rey, DO     • amLODIPine  5 mg Oral Daily Harvey Rey, DO     • aspirin  81 mg Oral Daily Harvey Rey, DO     • cinacalcet  30 mg Oral Every Other Day Chary Avendaño, DO     • [START ON 9/9/2023] cloNIDine  1 patch Transdermal Weekly Harvey Raygoza, DO     • escitalopram  10 mg Oral Daily Harvey Rey, DO     • heparin (porcine)  5,000 Units Subcutaneous Q8H [address] Harvey Rey, DO     • hydrALAZINE  15 mg Intravenous Q15 Min PRN Millicent Valle, DO      And   • niCARdipine  1-15 mg/hr Intravenous Continuous PRN Harvey Martinez DO Stopped (09/08/23 9304)   • insulin glargine  10 Units Subcutaneous HS Harvey Rey DO     • insulin lispro  1-5 Units Subcutaneous TID AC Harvey Rey DO     • insulin lispro  3 Units Subcutaneous TID With Meals Harvey Martinez, DO     • labetalol  10 mg Intravenous Q10 Min PRN Harvey Martinez, DO     • labetalol  300 mg Oral Q12H [address] Harvey Martinez, DO     • lidocaine  2 patch Topical Daily Harvey Rey, DO     • ondansetron  4 mg Intravenous Q6H PRN Harvey Rey DO     • oxyCODONE  5 mg Oral Q8H PRN Harvey Rey DO     • oxyCODONE  2.5 mg Oral Q8H PRN Harvey Rey DO     • sodium chloride  500 mL Intravenous Once PRN Harvey Rey DO      Followed by   • phenylephine   mcg/min Intravenous Continuous PRN Harvey Rey DO     • polyethylene glycol  17 g Oral Daily Harvey Rey DO     • pravastatin  80 mg Oral Daily With Dinner Millicent Valle, DO     • senna  1 tablet Oral Daily Harvey Rey DO     • sodium bicarbonate  650 mg Oral TID after meals Latha Webb MD     • ticagrelor  90 mg Oral Q12H [facility], DO     • traZODone  50 mg Oral HS Millicent Valle DO          Today, Patient Was Seen By: Topher Dowling DO    **Please Note: This note may have been constructed using a voice recognition system. **

## 2023-09-08 NOTE — CASE MANAGEMENT
Case Management Progress Note    Patient name Melanie Hendrickson  Location [address] [address]/[facility] [id number] MRN [id number]  : 1945 Date 2023       LOS (days): 5  Geometric Mean LOS (GMLOS) (days): 4.50  Days to GMLOS:-0.5        OBJECTIVE:        Current admission status: Inpatient  Preferred Pharmacy:    [address], [address], [address] [address] [employer] ([phone] [id number] [employer] ([id number])  [address] [address]  Phone: [phone] Fax: [phone]    [id number] [address]ORE - [address] [place] [address] [place] [address]  Phone: [phone] Fax: [phone]    Primary Care Provider: Edgardo Marina MD    Primary Insurance: [facility] REP  Secondary Insurance:     PROGRESS NOTE: Auth remains pending for [address] [place] [address].

## 2023-09-08 NOTE — ASSESSMENT & PLAN NOTE
68YO R-handed female with history of HTN, HLD, CKD, recent acute CVA of L occipital lobe and L MCA territory with residual expressive aphasia and R hemianopia (8/15/2023), advanced atherosclerotic disease affecting multiple neurovascular structures who presented as a stroke alert on 9/3/2023 for acute RLE and RUE weakness. This occurred while she was working with PT at [employer]. Denied headache, dizziness, numbness/tingling. Initial NIHSS 8 - inability to accurately state month and age, R hemianopia, RLE weakness, RLE decreased sensation, moderate expressive aphasia. Patient has been on ASA/Brilinta since 7/29/2023 for CVA. No TNK administered given complete resolution of acute symptoms. CTH showed stable subacute infarcts within the L occipital and parietal lobes. CTA showed stable advanced atherosclerotic disease throughout cervical and intracranial vasculature with multiple areas of severe stenosis most prominent within the ICA bulb bilaterally; no significant change compared with prior examination. Patient was recently scheduled for TCAR with vascular surgery but this was canceled due to elevated A1c level. Admitted on stroke pathway. MRI brain concerning for acute stroke, etiology most likely associated with severe ICA stenosis. Vascular surgery team consulted for intervention. Prior work-up:  -Echo, 8/18/23: EF 62%. G1DD, LA normal size, no major valvular dysfunction. -MRI brain wo contrast, 9/5/23: 1. Scattered multifocal infarctions predominantly in the left occipital lobe as well as in the left frontal and parietal lobes in the watershed territory, slightly increased in size and number from the prior study indicative of trace crescendo   infarctions/progressive infarctions associated with previously present infarctions in these region. 2. Trace T1 high signal in the left occipital lobe is worrisome for petechial microhemorrhage. No significant mass effect.  Recommend follow-up repeat head CT in 24 hours to assess for stability. 3. Moderate, chronic microangiopathy. Lab Results   Component Value Date    HGBA1C 7.3 (H) 09/03/2023    LDLCALC 59 09/03/2023       Impression: MRI brain demonstrated multifocal acute infarcts in the left occipital, left frontal, left parietal regions and watershed territories. Etiology suspected to be secondary to hypoperfusion in the setting of severe ICA stenosis. Plan, Stroke Pathway:  · Discussed with attending Dr. Fernando Rojas  · Continue home ASA/Brilinta  · Continue home Pravastatin 80mg daily with dinner  · SBP <160 but low threshold for further fluid boluses if symptomatic with lower SBPs. Recommend normotensive goal after TCAR procedure. · Discussed with vascular surgery team. Plan for ASA/Brilinta x 3 months - continue DAPT until seen by vascular surgery OP. · Given concerns for poststroke depression/anxiety, continue Lexapro 10 mg daily. · Normoglycemia, normothermia. · Monitor on telemetry  · Bedside swallow eval  · Stat CT Head for change in neuro status. · DVT ppx, SCDs  • PT/OT/PM&R evaluations   • Medical management as per primary team appreciated.

## 2023-09-08 NOTE — RESTORATIVE TECHNICIAN NOTE
Restorative Technician Note      Patient Name: Rocky Mcarthur     Note Type: Mobility  Patient Position Upon Consult: Bedside chair  Activity Performed: Transferred; Other (Comment) (Com[employer])  Assistive Device: Roller walker  Patient Position at End of Consult: Bedside chair;  All needs within reach

## 2023-09-08 NOTE — ASSESSMENT & PLAN NOTE
Initially recommended permissive hypertension per neurology   · Currently on amlodipine, clonidine patch and labetalol  · Losartan still on hold  · Monitor routinely

## 2023-09-08 NOTE — ASSESSMENT & PLAN NOTE
Lab Results   Component Value Date    HGBA1C 7.3 (H) 09/03/2023       Recent Labs     09/07/23  1044 09/07/23  1532 09/07/23 2057 09/08/23  0609   POCGLU 148* 118 111 128       Blood Sugar Average: Last 72 hrs:  (P) [id number]     Uncontrolled  · Hold glipizide while inpatient  · Continue Lantus at bedtime and Humalog with meals  · Monitor Accu-Cheks and adjust as needed   · Avoid hypoglycemia  · Hypoglycemia protocol in place

## 2023-09-08 NOTE — ASSESSMENT & PLAN NOTE
Patient with left carotid artery disease stroke  · CTA head and neck reveals extensive intracranial atherosclerotic disease stenosis extensive disease bilateral carotid arteries  · Evaluated by vascular surgery, the patient is at high risk for hemorrhagic conversion given brain MRI finding of petechial microhemorrhage  · Continue aspirin statin Brilinta  · Status post left TCAR without complication on 9/7  · Management per primary vascular  · Plan for left transcarotid artery revascularization today by vascular

## 2023-09-08 NOTE — ASSESSMENT & PLAN NOTE
Lab Results   Component Value Date    EGFR 29 09/08/2023    EGFR 29 09/07/2023    EGFR 20 09/06/2023    CREATININE 1.66 (H) 09/08/2023    CREATININE 1.67 (H) 09/07/2023    CREATININE 2.26 (H) 09/06/2023     · Nephrology is following baseline creatinine 1.5-1.8  · Stable  · DC IV fluid  · Monitor

## 2023-09-08 NOTE — ASSESSMENT & PLAN NOTE
Patient presents with acute onset right upper and lower extremity weakness while at SNF undergoing physical therapy for recent stroke in mid-August. She was a stroke alert and was evaluated by neurology in the ED. Patient symptomatically improving at time of admission. Brain MRI with scattered multifocal infarctions predominantly in the left occipital lobe as well as in the left frontal and parietal lobes in the watershed territory, slightly increased in size and number from the prior study indicative of trace crescendo infarctions/progressive infarctions associated with previously present infarctions in these region.   · Per neurology suspect acute stroke secondary to symptomatic ICA stenosis and recommending vascular surgery evaluation   · Continue aspirin, Brilinta, pravastatin  · S/p  left TCAR on 9/ 7  · Monitor  · PT OT eval

## 2023-09-08 NOTE — PROGRESS NOTES
[address] [facility] 70 y.o. female MRN: [id number]  Unit/Bed#: [facility] 502-01 Encounter: [id number]  Reason for Consult: RICARDO     ASSESSMENT and PLAN:    1. Acute kidney injury. Baseline creatinine appears to be around 1.5-1.8. Creatinine on admission 1.19, creatinine on consultation 2.26 09/06, creatinine today 1.66.  Etiology of RICARDO most likely ATN due to contrast associated nephropathy from contrast administered 09/03. Urinalysis shows 2+ protein, 2-4 RBC, innumerable WBC and moderate bacteria without symptoms. Kidney ultrasound with left renal atrophy, no hydronephrosis. Kidney function currently improved with holding losartan and giving IV fluids. She had iodinated contrast administration with TCAR on 09/07. She did receive pre and post TCAR hydration to minimize the risk for contrast associated nephropathy. Kidney function currently remains stable at baseline. Monitor for worsening kidney function in setting of iodinated contrast administration x2 during this admission. Okay to stop IV fluids from nephrology perspective. Continue to hold losartan for now. Trend BMP. 2. Cystic lesion in right kidney. She needs dedicated imaging for this and can be done at a later date. Would recommend doing MRI with [facility] once she recovers from the acute issues. 3. Hypertension. Pressure is currently acceptable. Currently on amlodipine 5 mg daily, clonidine 0.3 mg patch, labetalol 300 mg twice daily. Losartan remains on hold for now. If further blood pressure control is required, can increase amlodipine to 10 mg daily. 4. Mild hyponatremia. This was in setting of acute kidney injury which is currently resolved. 5. 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 Petechial microhemorrhage in left occipital lobe. Moderate chronic microangiopathy. Bilateral carotid artery stenosis in setting of left CVA.   Status post TCAR and is postop day 1. Discussed with internal medicine team.  After discussion, we agreed that kidney function remains stable at baseline and to continue to monitor for contrast associated nephropathy. He also agreed to keep losartan on hold for now. SUBJECTIVE / 24H INTERVAL HISTORY:  Patient is postop day 1 after TCAR. Currently feeling well. Denies dyspnea. Denies leg swelling. Review of Systems   Constitutional: Negative for chills and fever. HENT: Negative for ear pain and sore throat. Eyes: Negative for pain and visual disturbance. Respiratory: Negative for cough and shortness of breath. Cardiovascular: Negative for chest pain and palpitations. Gastrointestinal: Negative for abdominal pain and vomiting. Genitourinary: Negative for dysuria and hematuria. Musculoskeletal: Negative for arthralgias and back pain. Skin: Negative for color change and rash. Neurological: Negative for seizures and syncope. All other systems reviewed and are negative. OBJECTIVE:  Current Weight: Weight - Scale: 71.9 kg (158 lb 6.4 oz)  Vitals:    09/08/23 0656 09/08/23 0700 09/08/23 0743 09/08/23 0809   BP: 165/69 124/57 124/57 161/68   BP Location:       Pulse:   69 98   Resp:   17    Temp:       TempSrc:       SpO2:   99%    Weight:       Height:           Intake/Output Summary (Last 24 hours) at 9/8/2023 1026  Last data filed at 9/8/2023 0823  Gross per 24 hour   Intake 3096.17 ml   Output 1447 ml   Net 1649.17 ml     Physical Exam  Vitals and nursing note reviewed. Constitutional:       General: She is not in acute distress. Appearance: She is well-developed. HENT:      Head: Normocephalic and atraumatic. Eyes:      Conjunctiva/sclera: Conjunctivae normal.   Cardiovascular:      Rate and Rhythm: Normal rate and regular rhythm. Pulses: Normal pulses. Heart sounds: Normal heart sounds. No murmur heard.   Pulmonary:      Effort: Pulmonary effort is normal. No respiratory distress. Breath sounds: Normal breath sounds. Abdominal:      Palpations: Abdomen is soft. Tenderness: There is no abdominal tenderness. Musculoskeletal:         General: No swelling. Cervical back: Neck supple. Right lower leg: No edema. Left lower leg: No edema. Skin:     General: Skin is warm and dry. Capillary Refill: Capillary refill takes less than 2 seconds. Neurological:      Mental Status: She is alert.    Psychiatric:         Mood and Affect: Mood normal.         Medications:    Current Facility-Administered Medications:   •  acetaminophen (TYLENOL) tablet 650 mg, 650 mg, Oral, Q6H PRN, Harvey Rey DO, 650 mg at 09/05/23 0248  •  acetaminophen (TYLENOL) tablet 975 mg, 975 mg, Oral, Q8H [address], Harvey Rey DO, 975 mg at 09/08/23 0524  •  aluminum-magnesium hydroxide-simethicone (MAALOX) oral suspension 30 mL, 30 mL, Oral, Q6H PRN, Harvey Rey DO  •  amLODIPine (NORVASC) tablet 5 mg, 5 mg, Oral, Daily, Harvey Rey DO, 5 mg at 09/08/23 3654  •  aspirin chewable tablet 81 mg, 81 mg, Oral, Daily, Harvey Rey DO, 81 mg at 09/08/23 0810  •  cinacalcet (SENSIPAR) tablet 30 mg, 30 mg, Oral, Every Other Day, Harvey Rey DO, 30 mg at 09/08/23 0809  •  [START ON 9/9/2023] cloNIDine (CATAPRES-TTS-3) 0.3 mg/24 hr TD weekly patch, 1 patch, Transdermal, Weekly, Harvey Rey DO  •  escitalopram (LEXAPRO) tablet 10 mg, 10 mg, Oral, Daily, Harvey Rey DO, 10 mg at 09/08/23 0810  •  heparin (porcine) subcutaneous injection 5,000 Units, 5,000 Units, Subcutaneous, Q8H [address], 5,000 Units at 09/08/23 0524 **AND** [COMPLETED] Platelet count, , , Once, Jennie Tidwell MD  •  [COMPLETED] labetalol (NORMODYNE) injection 5 mg, 5 mg, Intravenous, Q15 Min PRN, 5 mg at 09/07/23 1617 **AND** hydrALAZINE (APRESOLINE) injection 15 mg, 15 mg, Intravenous, Q15 Min PRN, 15 mg at 09/08/23 0656 **AND** niCARdipine (CARDENE) 25 mg (STANDARD CONCENTRATION) in sodium chloride 0.9% 250 mL, 1-15 mg/hr, Intravenous, Continuous PRN, Simone Brannon DO, Stopped at 09/08/23 1051  •  insulin glargine (LANTUS) subcutaneous injection 10 Units 0.1 mL, 10 Units, Subcutaneous, HS, Harvey Rey DO, 10 Units at 09/07/23 2313  •  insulin lispro (HumaLOG) 100 units/mL subcutaneous injection 1-5 Units, 1-5 Units, Subcutaneous, TID AC **AND** Fingerstick Glucose (POCT), , , TID AC, Harvey Rey DO  •  insulin lispro (HumaLOG) 100 units/mL subcutaneous injection 3 Units, 3 Units, Subcutaneous, TID With Meals, Simone Brannon, DO, 3 Units at 09/06/23 1730  •  labetalol (NORMODYNE) injection 10 mg, 10 mg, Intravenous, Q10 Min PRN, Harvey Rey DO  •  labetalol (NORMODYNE) tablet 300 mg, 300 mg, Oral, Q12H [facility], Harvey Rey DO, 300 mg at 09/08/23 0809  •  lactated ringers infusion, 75 mL/hr, Intravenous, Continuous, Harvey Rey DO, Stopped at 09/08/23 0533  •  lidocaine (LIDODERM) 5 % patch 2 patch, 2 patch, Topical, Daily, Harvey Rey DO, 2 patch at 09/07/23 0909  •  ondansetron (ZOFRAN) injection 4 mg, 4 mg, Intravenous, Q6H PRN, Harvey Rey DO  •  oxyCODONE (ROXICODONE) IR tablet 5 mg, 5 mg, Oral, Q8H PRN, Harvey Rey DO, 5 mg at 09/07/23 2032  •  oxyCODONE (ROXICODONE) split tablet 2.5 mg, 2.5 mg, Oral, Q8H PRN, Harvey Rey DO  •  sodium chloride 0.9 % bolus 500 mL, 500 mL, Intravenous, Once PRN **FOLLOWED BY** phenylephrine (STACEY-SYNEPHRINE) 50 mg (STANDARD CONCENTRATION) in sodium chloride 0.9% 250 mL,  mcg/min, Intravenous, Continuous PRN, Harvey Rey DO  •  polyethylene glycol (MIRALAX) packet 17 g, 17 g, Oral, Daily, Harvey Rey DO, 17 g at 09/08/23 2815  •  pravastatin (PRAVACHOL) tablet 80 mg, 80 mg, Oral, Daily With Dinner, Simone Brannon DO, 80 mg at 09/06/23 1730  •  senna (SENOKOT) tablet 8.6 mg, 1 tablet, Oral, Daily, Harvey Rey DO, 8.6 mg at 09/08/23 0810  •  ticagrelor (BRILINTA) tablet 90 mg, 90 mg, Oral, Q12H [facility], Harvey Rey DO, 90 mg at 09/08/23 0811  •  traZODone (DESYREL) tablet 50 mg, 50 mg, Oral, HS, Harvey Rey, DO, 50 mg at 09/07/23 2313    Laboratory Results:  Results from last 7 days   Lab Units 09/08/23  0500 09/07/23  1421 09/07/23  0459 09/06/23  0511 09/04/23  0528 09/03/23  1804 09/03/23  1314   WBC Thousand/uL 8.41  --   --  8.27 7.13  --  6.70   HEMOGLOBIN g/dL 11.1*  --   --  12.4 13.2  --  11.7   I STAT HEMOGLOBIN g/dl  --  13.9  --   --   --   --   --    HEMATOCRIT % 32.3*  --   --  35.6 37.4  --  34.7*   HEMATOCRIT, ISTAT %  --  41  --   --   --   --   --    PLATELETS Thousands/uL 228  --   --  282 274 287 267   POTASSIUM mmol/L 4.2  --  4.3 4.3 4.1  --  4.2   CHLORIDE mmol/L 108  --  103 102 104  --  100   CO2 mmol/L 19*  --  19* 23 21  --  24   CO2, I-STAT mmol/L  --  22  --   --   --   --   --    BUN mg/dL 30*  --  34* 47* 51*  --  65*   CREATININE mg/dL 1.66*  --  1.67* 2.26* 1.73*  --  1.93*   CALCIUM mg/dL 8.3*  --  6.9* 9.7 9.8  --  8.9   GLUCOSE, ISTAT mg/dl  --  130  --   --   --   --   --      Portions of the record may have been created with voice recognition software. Occasional wrong word or "sound a like" substitutions may have occurred due to the inherent limitations of voice recognition software. Read the chart carefully and recognize, using context, where substitutions have occurred. If you have any questions, please contact the dictating provider.

## 2023-09-08 NOTE — OCCUPATIONAL THERAPY NOTE
Occupational Therapy Progress Note     Patient Name: Libby Mckeon  KRTVZ'H Date: 9/8/2023  Problem List  Principal Problem:    Acute CVA (cerebrovascular accident) [facility])  Active Problems:    Acute renal failure superimposed on stage 3 chronic kidney disease (HCC)    Hypertension    Obesity (BMI 30-39. 9)    Hyperlipidemia, unspecified    Symptomatic carotid artery stenosis, left    Depression, recurrent (HCC)    Type 2 diabetes mellitus, without long-term current use of insulin ([facility])    Aortoiliac occlusive disease ([address])    Aphasia as late effect of cerebrovascular accident (CVA)    Smoking    Renal cyst        09/08/23 1410   OT Last Visit   OT Visit Date 09/08/23   Note Type   Note Type Treatment   Pain Assessment   Pain Assessment Tool 0-10   Pain Score No Pain   Restrictions/Precautions   Weight Bearing Precautions Per Order No   Other Precautions Cognitive; Chair Alarm; Bed Alarm;Multiple lines; Fall Risk  (RUE weaker than L, pt aphasic)   ADL   Where Assessed Edge of bed   Grooming Assistance 4  Minimal Assistance   Grooming Deficit Setup; Increased time to complete; Teeth care   Grooming Comments pt rq Native A for opening objects for teeth brushing tasks. Pt also required support at R elbow to successfully brush teeth, but was able to maintain grasp on toothbrush. UB Dressing Assistance 4  Minimal Assistance   UB Dressing Deficit Increased time to complete; Thread RUE; Thread LUE;Supervision/safety;Verbal cueing   UB Dressing Comments vc for threading RUE, Native A for using R hand to pull gown over LUE   Functional Standing Tolerance   Time 10 min   Activity standing for RUE coordination/ neuromusc re-ed. Comments pt stood w mod A from theapist with RLE knee block (occasional) and assist at hips for maintaining upright posture. Bed Mobility   Supine to Sit 3  Moderate assistance   Additional items Assist x 1; Increased time required;Verbal cues;LE management   Additional Comments found in bed, left in chair w all needs in reach and alarm on   Transfers   Sit to Stand 3  Moderate assistance   Additional items Assist x 1; Increased time required;Verbal cues   Stand to Sit 3  Moderate assistance   Additional items Assist x 1; Increased time required;Verbal cues   Additional Comments c HHA   Functional Mobility   Functional Mobility 3  Moderate assistance   Additional Comments ax2, EOB>chair. Additional items Hand hold assistance   Neuromuscular Education   Weight Bearing Technique Yes   RUE Weight Bearing Extended arm standing   Coordination   Gross Motor pt engaged in RUE re-ed training. Included reaching for objects outside of NALINI, using various grasp patterns to then stack objects. Pt required occasional assistance at elbow to successfully reach objects. tolerated well overall. Cognition   Overall Cognitive Status Impaired   Arousal/Participation Responsive; Alert; Cooperative   Attention Attends with cues to redirect   Orientation Level Oriented to person;Oriented to place;Oriented to situation   Memory Decreased short term memory;Decreased recall of recent events   Following Commands Follows one step commands with increased time or repetition   Comments pt pleasant and cooperative, aphasic but with improvements in speech as compared to previous sessions. Pt continues to require inc time to follow one step commands, and benefits from demonstration prior to completion of tasks. Also with exec function deficits   Activity Tolerance   Activity Tolerance Patient tolerated treatment well   Medical Staff Made Aware DPT Héctor Mclaughlin, PT/OT goals addressed separately during session   Assessment   Assessment Pt seen on this date for OT session focusing on ADL retraining, cognitive reorientation, body mechanics, transfer retraining, RUE ROM, strength, coordination retraining, increasing activity tolerance/endurance to increase ability to participate in ADL/functional tasks.  Pt was found in bed and was left in chair w/ all needs within reach. Pt completed bed mob and STS w mod Ax1 c HHA, FM with mod Ax2 from EOB>Chair. Stood about 10 mins during RUE coordination activities. While in stance, pt requires mod A for maintaining midline in stance, and becomes mildly retropulsive when performing dynamic standing tasks. She also requires R knee block. Pt engaged in RUE coordination task, including reaching outside of NALINI to obtain cups, then supinating/pronating R forearm to place cup across midline. Pt also engaged in cog retraining, including sequencing numbers on cups, holding cup and then transferring across midline, stacking cups in chronological order. Pt then sat in chair and engaged in grooming task of brushing teeth. Support at R elbow for successful task completion. Pt w/ improvements in activity tolerance, transfer ability, ADL Task completion, however is still limited 2* decreased ADL/High-level ADL status, decreased activity tolerance/endurance, decreased cognition, decreased self-care trans, decreased safety awareness and insight to condition. The patient's raw score on the AM-PAC Daily Activity Inpatient Short Form is 15. A raw score of less than 19 suggests the patient may benefit from discharge to post-acute rehabilitation services. Please refer to the recommendation of the Occupational Therapist for safe discharge planning. Recommending pt D/C to STR when medically stable. Pt will continue to benefit from acute OT services to meet goals. Plan   Treatment Interventions ADL retraining;Functional transfer training;UE strengthening/ROM; Endurance training;Cognitive reorientation;Patient/family training; Activityengagement; Energy conservation; Neuromuscular reeducation   Goal Expiration Date 09/14/23   OT Treatment Day 2   OT Frequency 2-3x/wk   Recommendation   OT Discharge Recommendation Post acute rehabilitation services   [facility] Daily Activity Inpatient   Lower Body Dressing 2   Bathing 2   Toileting 2   Upper Body Dressing 3   Grooming 3   Eating 3   Daily Activity Raw Score 15   Daily Activity Standardized Score (Calc for Raw Score >=11) 34.69   AM-PAC Applied Cognition Inpatient   Following a Speech/Presentation 3   Understanding Ordinary Conversation 3   Taking Medications 2   Remembering Where Things Are Placed or Put Away 2   Remembering List of 4-5 Errands 2   Taking Care of Complicated Tasks 2   Applied Cognition Raw Score 14   Applied Cognition Standardized Score 32.02   Modified Maggy Scale   Modified [place] Scale 4   End of Consult   Education Provided Yes   Patient Position at End of Consult Bedside chair; All needs within reach   Nurse Communication Nurse aware of consult       Bright TRENT Bright, OTR/L

## 2023-09-08 NOTE — PROGRESS NOTES
NEUROLOGY RESIDENCY PROGRESS NOTE     Name: Branden Coates   Age & Sex: 70 y.o. female   MRN: [id number]  Unit/Bed#: [facility] 502-01   Encounter: [id number]    Outpatient follow-up with neurovascular attending/REAGAN/resident in 6 to 8 weeks. No further outpatient neurological testing indicated at this time. Stable for discharge from neurology standpoint. Thank you for allowing us to be a part of her care. Please reach out to us with any further questions or concerns. ASSESSMENT & PLAN     * Acute CVA (cerebrovascular accident) [facility])  Assessment & Plan  68YO R-handed female with history of HTN, HLD, CKD, recent acute CVA of L occipital lobe and L MCA territory with residual expressive aphasia and R hemianopia (8/15/2023), advanced atherosclerotic disease affecting multiple neurovascular structures who presented as a stroke alert on 9/3/2023 for acute RLE and RUE weakness. This occurred while she was working with PT at [employer]. Denied headache, dizziness, numbness/tingling. Initial NIHSS 8 - inability to accurately state month and age, R hemianopia, RLE weakness, RLE decreased sensation, moderate expressive aphasia. Patient has been on ASA/Brilinta since 7/29/2023 for CVA. No TNK administered given complete resolution of acute symptoms. CTH showed stable subacute infarcts within the L occipital and parietal lobes. CTA showed stable advanced atherosclerotic disease throughout cervical and intracranial vasculature with multiple areas of severe stenosis most prominent within the ICA bulb bilaterally; no significant change compared with prior examination. Patient was recently scheduled for TCAR with vascular surgery but this was canceled due to elevated A1c level. Admitted on stroke pathway. MRI brain concerning for acute stroke, etiology most likely associated with severe ICA stenosis. Vascular surgery team consulted for intervention. Prior work-up:  -Echo, 8/18/23: EF 62%.  G1DD, LA normal size, no major valvular dysfunction. -MRI brain wo contrast, 9/5/23: 1. Scattered multifocal infarctions predominantly in the left occipital lobe as well as in the left frontal and parietal lobes in the watershed territory, slightly increased in size and number from the prior study indicative of trace crescendo   infarctions/progressive infarctions associated with previously present infarctions in these region. 2. Trace T1 high signal in the left occipital lobe is worrisome for petechial microhemorrhage. No significant mass effect. Recommend follow-up repeat head CT in 24 hours to assess for stability. 3. Moderate, chronic microangiopathy. Lab Results   Component Value Date    HGBA1C 7.3 (H) 09/03/2023    LDLCALC 59 09/03/2023       Impression: MRI brain demonstrated multifocal acute infarcts in the left occipital, left frontal, left parietal regions and watershed territories. Etiology suspected to be secondary to hypoperfusion in the setting of severe ICA stenosis. Plan, Stroke Pathway:  · Discussed with attending Dr. Ryan Adhikari  · Continue home ASA/Brilinta  · Continue home Pravastatin 80mg daily with dinner  · SBP <160 but low threshold for further fluid boluses if symptomatic with lower SBPs. Recommend normotensive goal after TCAR procedure. · Discussed with vascular surgery team. Plan for ASA/Brilinta x 3 months - continue DAPT until seen by vascular surgery OP. · Given concerns for poststroke depression/anxiety, continue Lexapro 10 mg daily. · Normoglycemia, normothermia. · Monitor on telemetry  · Bedside swallow eval  · Stat CT Head for change in neuro status. · DVT ppx, SCDs  • PT/OT/PM&R evaluations   • Medical management as per primary team appreciated. SUBJECTIVE     Patient was seen and examined. No acute events overnight. Patient is very cheerful today. She reports that she did very well after surgery. She has no acute concerns at this time.   She feels she has full strength in all her extremities. Review of Systems   Constitutional: Negative for appetite change, chills, diaphoresis, fatigue and fever. HENT: Negative for trouble swallowing. Eyes: Negative for visual disturbance. Gastrointestinal: Negative for nausea and vomiting. Genitourinary: Negative for difficulty urinating. Musculoskeletal: Negative for back pain. Neurological: Positive for speech difficulty. Negative for dizziness, weakness, numbness and headaches. Psychiatric/Behavioral: The patient is not nervous/anxious. All other systems reviewed and are negative. OBJECTIVE     Patient ID: Precious Neff is a 70 y.o. female. Vitals:    23 1717 23 1730 23 1745 23 1851   BP: (!) 152/108 118/63 137/59 151/73   BP Location:       Pulse: 73 62 70 78   Resp:    18   Temp:    (!) 97.2 °F (36.2 °C)   TempSrc:       SpO2: 100% 100% 100% 99%   Weight:       Height:          Temperature:   Temp (24hrs), Av.3 °F (36.3 °C), Min:97.1 °F (36.2 °C), Max:97.5 °F (36.4 °C)    Temperature: (!) 97.2 °F (36.2 °C)      Physical Exam  Vitals reviewed. Constitutional:       General: She is not in acute distress. Appearance: She is not ill-appearing, toxic-appearing or diaphoretic. HENT:      Head: Normocephalic and atraumatic. Right Ear: External ear normal.      Left Ear: External ear normal.      Nose: Nose normal.      Mouth/Throat:      Mouth: Mucous membranes are moist.   Eyes:      General:         Right eye: No discharge. Left eye: No discharge. Extraocular Movements: Extraocular movements intact and EOM normal.      Conjunctiva/sclera: Conjunctivae normal.      Pupils: Pupils are equal, round, and reactive to light. Cardiovascular:      Rate and Rhythm: Normal rate. Pulmonary:      Effort: Pulmonary effort is normal. No respiratory distress. Musculoskeletal:      Right lower leg: No edema. Left lower leg: No edema.    Skin:     General: Skin is warm and dry. Neurological:      Mental Status: She is alert and oriented to person, place, and time. Mental status is at baseline. Cranial Nerves: Cranial nerves 2-12 are intact. Sensory: No sensory deficit. Motor: Motor strength is normal.     Coordination: Finger-Nose-Finger Test normal.      Deep Tendon Reflexes:      Reflex Scores:       Tricep reflexes are 1+ on the right side and 1+ on the left side. Bicep reflexes are 1+ on the right side and 1+ on the left side. Brachioradialis reflexes are 1+ on the right side and 1+ on the left side. Patellar reflexes are 1+ on the right side and 1+ on the left side. Achilles reflexes are 1+ on the right side and 1+ on the left side. Neurologic Exam     Mental Status   Oriented to person, place, and time. Follows commands: confused; receptive aphasia. Speech: (Mixed expressive > receptive aphasia)  Able to name object. Able to read. Able to repeat. Abnormal comprehension. No dysarthria appreciated. Cranial Nerves   Cranial nerves II through XII intact. CN II   Visual fields full to confrontation. CN III, IV, VI   Pupils are equal, round, and reactive to light. Extraocular motions are normal.     CN V   Facial sensation intact. CN VII   Facial expression full, symmetric. CN VIII   CN VIII normal.     CN IX, X   CN IX normal.   CN X normal.     CN XI   CN XI normal.     CN XII   CN XII normal.     Motor Exam   Muscle bulk: normal  Overall muscle tone: normal    Strength   Strength 5/5 throughout. Sensory Exam   Light touch normal.     Gait, Coordination, and Reflexes     Coordination   Finger to nose coordination: normal    Reflexes   Right brachioradialis: 1+  Left brachioradialis: 1+  Right biceps: 1+  Left biceps: 1+  Right triceps: 1+  Left triceps: 1+  Right patellar: 1+  Left patellar: 1+  Right achilles: 1+  Left achilles: 1+           LABORATORY DATA     Labs:  I have personally reviewed pertinent reports. Results from last 7 days   Lab Units 09/08/23  0500 09/07/23  1421 09/06/23  0511 09/04/23  0528   WBC Thousand/uL 8.41  --  8.27 7.13   HEMOGLOBIN g/dL 11.1*  --  12.4 13.2   I STAT HEMOGLOBIN g/dl  --  13.9  --   --    HEMATOCRIT % 32.3*  --  35.6 37.4   HEMATOCRIT, ISTAT %  --  41  --   --    PLATELETS Thousands/uL 228  --  282 274   NEUTROS PCT %  --   --   --  60   MONOS PCT %  --   --   --  12   EOS PCT %  --   --   --  5      Results from last 7 days   Lab Units 09/08/23  0500 09/07/23  1421 09/07/23  0459 09/06/23  0511   SODIUM mmol/L 137  --  138 134*   POTASSIUM mmol/L 4.2  --  4.3 4.3   CHLORIDE mmol/L 108  --  103 102   CO2 mmol/L 19*  --  19* 23   CO2, I-STAT mmol/L  --  22  --   --    BUN mg/dL 30*  --  34* 47*   CREATININE mg/dL 1.66*  --  1.67* 2.26*   CALCIUM mg/dL 8.3*  --  6.9* 9.7   GLUCOSE, ISTAT mg/dl  --  130  --   --               Results from last 7 days   Lab Units 09/08/23  0500 09/03/23  1314   INR  0.96 0.98   PTT seconds 20* 34               IMAGING & DIAGNOSTIC TESTING     Radiology Results: I have personally reviewed pertinent reports. and I have personally reviewed pertinent films in PACS    US kidney and bladder   Final Result by Mihir Pineda MD (09/06 2216)      No hydronephrosis. Cystic lesion in the right kidney which appears to have a thickened, perceptible wall. Recommend further evaluation of this finding with either a contrast-enhanced CT scan or MRI using renal mass protocol when the patient's renal function recovers. Workstation performed: [id number]         CT head wo contrast   Final Result by Andrew Kenney MD (09/06 0216)      Known smaller acute and subacute infarcts throughout the left cerebral hemisphere involving left frontal, left parietal, and left temporal lobes are better evaluated on MRI brain without contrast 9/5/2023 given background moderate-to-severe chronic    microangiopathy.       Chronic infarct in left medial parieto-occipital lobe with slightly increased cortical density, which may be due to cortical laminar necrosis over petechial cortical hemorrhage. No new acute intracranial abnormality. Workstation performed: [id number]         MRI brain wo contrast   Final Result by Lakshmi Dean MD (09/05 0825)         1. Scattered multifocal infarctions predominantly in the left occipital lobe as well as in the left frontal and parietal lobes in the watershed territory, slightly increased in size and number from the prior study indicative of trace crescendo    infarctions/progressive infarctions associated with previously present infarctions in these region. 2. Trace T1 high signal in the left occipital lobe is worrisome for petechial microhemorrhage. No significant mass effect. Recommend follow-up repeat head CT in 24 hours to assess for stability. 3. Moderate, chronic microangiopathy. Workstation performed: [id number]         XR spine lumbar 2 or 3 views injury   Final Result by Miley Etienne MD (09/05 1237)      Transitional lumbosacral anatomy   Degenerative disease seen at the L2-3   Degenerative disease seen at L4-5   Sacralization of the L5 vertebra   No acute compression collapse of the vetebra      Workstation performed: [id number]         CTA stroke alert (head/neck)   Final Result by Dada Orourke DO (09/03 1325)      Stable advanced atherosclerotic disease throughout the cervical and intracranial vasculature with multiple areas of severe stenosis most prominent within the internal carotid artery bulb bilaterally. Advanced atherosclerotic disease of the distal left vertebral artery with occlusion proximal to the vertebrobasilar junction and severe high-grade stenosis of the proximal basilar. Additional areas of moderate to advanced atherosclerotic disease    scattered throughout the cervical and intracranial vasculature.  Overall no significant change when compared with the prior examination. Findings were directly discussed with Elvia Quesada  at 1:20 p.m. Workstation performed: [id number]         CT stroke alert brain   Final Result by Dada Nava DO (09/03 1326)      Stable subacute infarcts within the left occipital and parietal lobes. No mass effect or hemorrhagic transformation. Additional moderate periventricular white matter change consistent with chronic microangiopathy. No hemorrhage. Findings were directly discussed with Elvia Quesada  at 1:20 p.m. Workstation performed: [id number]         IR carotid stent    (Results Pending)       Other Diagnostic Testing: I have personally reviewed pertinent reports.       ACTIVE MEDICATIONS     Current Facility-Administered Medications   Medication Dose Route Frequency   • acetaminophen (TYLENOL) tablet 650 mg  650 mg Oral Q6H PRN   • acetaminophen (TYLENOL) tablet 975 mg  975 mg Oral Q8H [address]   • aluminum-magnesium hydroxide-simethicone (MAALOX) oral suspension 30 mL  30 mL Oral Q6H PRN   • amLODIPine (NORVASC) tablet 5 mg  5 mg Oral Daily   • aspirin chewable tablet 81 mg  81 mg Oral Daily   • cinacalcet (SENSIPAR) tablet 30 mg  30 mg Oral Every Other Day   • [START ON 9/9/2023] cloNIDine (CATAPRES-TTS-3) 0.3 mg/24 hr TD weekly patch  1 patch Transdermal Weekly   • escitalopram (LEXAPRO) tablet 10 mg  10 mg Oral Daily   • heparin (porcine) subcutaneous injection 5,000 Units  5,000 Units Subcutaneous Q8H [address]   • insulin glargine (LANTUS) subcutaneous injection 10 Units 0.1 mL  10 Units Subcutaneous HS   • insulin lispro (HumaLOG) 100 units/mL subcutaneous injection 1-5 Units  1-5 Units Subcutaneous TID AC   • insulin lispro (HumaLOG) 100 units/mL subcutaneous injection 3 Units  3 Units Subcutaneous TID With Meals   • labetalol (NORMODYNE) injection 10 mg  10 mg Intravenous Q10 Min PRN   • labetalol (NORMODYNE) tablet 300 mg 300 mg Oral Q12H [facility]   • lidocaine (LIDODERM) 5 % patch 2 patch  2 patch Topical Daily   • ondansetron (ZOFRAN) injection 4 mg  4 mg Intravenous Q6H PRN   • oxyCODONE (ROXICODONE) IR tablet 5 mg  5 mg Oral Q8H PRN   • oxyCODONE (ROXICODONE) split tablet 2.5 mg  2.5 mg Oral Q8H PRN   • polyethylene glycol (MIRALAX) packet 17 g  17 g Oral Daily   • pravastatin (PRAVACHOL) tablet 80 mg  80 mg Oral Daily With Dinner   • senna (SENOKOT) tablet 8.6 mg  1 tablet Oral Daily   • sodium bicarbonate tablet 650 mg  650 mg Oral TID after meals   • ticagrelor (BRILINTA) tablet 90 mg  90 mg Oral Q12H [facility]   • traZODone (DESYREL) tablet 50 mg  50 mg Oral HS       Prior to Admission medications    Medication Sig Start Date End Date Taking?  Authorizing Provider   acetaminophen (TYLENOL) 500 mg tablet Take 650 mg by mouth every 6 (six) hours as needed for mild pain   Yes Historical Provider, MD   allopurinol (ZYLOPRIM) 100 mg tablet TAKE 1 TABLET BY MOUTH EVERY DAY 1/23/23  Yes Mindi Palacio MD   amLODIPine (NORVASC) 10 mg tablet Take 1 tablet (10 mg total) by mouth daily 5/15/23  Yes Omar Kemp MD   aspirin 81 mg chewable tablet Chew 81 mg daily   Yes Historical Provider, MD   cinacalcet (SENSIPAR) 30 mg tablet Take 1 tablet (30 mg total) by mouth every other day 8/7/23 5/3/24 Yes Mindi Palacio MD   cloNIDine (CATAPRES-TTS-3) 0.3 mg/24 hr PLACE 1 PATCH (0.3 MG TOTAL) ON THE SKIN ONCE A WEEK 5/1/23  Yes Omar Kemp MD   Heparin Sodium, Porcine, (heparin, porcine,) 5,000 units/mL Inject 5,000 Units under the skin every 8 (eight) hours   Yes Historical Provider, MD   insulin lispro (HumaLOG) 100 units/mL injection Inject under the skin   Yes Historical Provider, MD   lidocaine (LIDODERM) 5 % Apply 1 patch topically daily Remove & Discard patch within 12 hours or as directed by MD   Yes Historical Provider, MD   pravastatin (PRAVACHOL) 80 mg tablet Take 1 tablet (80 mg total) by mouth daily with dinner 8/19/23  Yes Frankey Co, MD   ticagrelor [facility]) 90 MG Take 1 tablet (90 mg total) by mouth every 12 (twelve) hours 8/19/23 9/18/23 Yes Frankey Co, MD   traZODone (DESYREL) 50 mg tablet Take 1 tablet (50 mg total) by mouth daily at bedtime 8/19/23  Yes Frankey Co, MD   glipiZIDE (GLUCOTROL) 5 mg tablet TAKE 1 TABLET BY MOUTH EVERY DAY WITH BREAKFAST 9/5/23   Jayjay Freire MD   Glucagon 1 MG/0.2ML SOAJ Inject 1 mg under the skin if needed (low blood sugar)    Historical Provider, MD   labetalol (NORMODYNE) 300 mg tablet Take 1 tablet (300 mg total) by mouth 2 (two) times a day  Patient taking differently: Take 400 mg by mouth 2 (two) times a day 5/15/23   Jayjay Freire MD   losartan (COZAAR) 100 MG tablet Take 1 tablet (100 mg total) by mouth daily 7/17/23   Jayjay Freire MD         VTE Pharmacologic Prophylaxis: Heparin  VTE Mechanical Prophylaxis: sequential compression device    ======    I have discussed the patient's history, physical exam findings, assessment, and plan in detail with attending, Dr. Jake Blakely    Thank you for allowing me to participate in the care of your patient, Jd Mckay.     DO [facility], PGY-2

## 2023-09-08 NOTE — PROGRESS NOTES
[address] [place] [address]  Progress Note  Name: Jessa Almanza  MRN: [id number]  Unit/Bed#: [facility] 334-68 I Date of Admission: 9/3/2023   Date of Service: 9/8/2023 I Hospital Day: 5    Assessment/Plan       71 y/o F w/ symptomatic LICA stenosis w/ recurrent ischemic CVA (Aphasia persistent/R-sided weakness improved) now s/p left TCAR, uncomplicated, neurologically intact post-op. · Post-op course without significant issue, neurologically shows improvement, expressive aphasia has improved and no motor/sensory deficits in either upper or lower extremities, continue frequent neurovasc checks   • BP control with SBP goal 110-160  ? A-line has been unreliable and without waveform this AM, plan to discontinue and monitor off cuff pressures   ? Requiring cardene gtt overnight however weaned off this AM based on cuff pressures   ? Cont PRN antihypertensives   • Cont neurovascular checks, currently without significant deficits   • Cont. Aspirin, Brillinta, and statin   • Neurology input appreciated   • Monitor Cr as given contrast administration, given IVF post-contrast   • Nephrology input appreciated   • PRN pain medication and anti-emetics  • Encourage ambulation  • DVT ppx  • Incentive spirometry 10 times/hour while awake  • Home medications as prescribed     Subjective:  No events overnight, homa not functional, off cardene gtt, no other complaints. Vitals:  /57   Pulse 69   Temp (!) 97.1 °F (36.2 °C) (Oral)   Resp 17   Ht 4' 10" (1.473 m)   Wt 71.9 kg (158 lb 6.4 oz)   LMP  (LMP Unknown)   SpO2 99%   BMI 33.11 kg/m²     I/Os:  I/O last 3 completed shifts: In: 4017 [P.O.:75; I.V.:3942]  Out: 7993 [Urine:1547; Blood:100]  No intake/output data recorded.     Lab Results and Cultures:   Lab Results   Component Value Date    WBC 8.41 09/08/2023    HGB 11.1 (L) 09/08/2023    HCT 32.3 (L) 09/08/2023    MCV 93 09/08/2023     09/08/2023     Lab Results   Component Value Date    GLUCOSE 130 09/07/2023    CALCIUM 8.3 (L) 09/08/2023    K 4.2 09/08/2023    CO2 19 (L) 09/08/2023     09/08/2023    BUN 30 (H) 09/08/2023    CREATININE 1.66 (H) 09/08/2023     Lab Results   Component Value Date    INR 0.96 09/08/2023    INR 0.98 09/03/2023    INR 1.07 01/22/2022    PROTIME 13.0 09/08/2023    PROTIME 13.2 09/03/2023    PROTIME 13.5 01/22/2022        Blood Culture:   Lab Results   Component Value Date    BLOODCX No Growth After 5 Days.  01/22/2022   ,   Urinalysis:   Lab Results   Component Value Date    COLORU Yellow 09/06/2023    CLARITYU Turbid 09/06/2023    SPECGRAV 1.016 09/06/2023    PHUR 6.0 09/06/2023    LEUKOCYTESUR Large (A) 09/06/2023    NITRITE Negative 09/06/2023    GLUCOSEU Negative 09/06/2023    KETONESU Negative 09/06/2023    BILIRUBINUR Negative 09/06/2023    BLOODU Negative 09/06/2023   ,   Urine Culture:   Lab Results   Component Value Date    URINECX >100,000 cfu/ml Escherichia coli (A) 08/22/2023   ,   Wound Culure: No results found for: "WOUNDCULT"    Medications:  Current Facility-Administered Medications   Medication Dose Route Frequency   • acetaminophen (TYLENOL) tablet 650 mg  650 mg Oral Q6H PRN   • acetaminophen (TYLENOL) tablet 975 mg  975 mg Oral Q8H [facility]   • aluminum-magnesium hydroxide-simethicone (MAALOX) oral suspension 30 mL  30 mL Oral Q6H PRN   • amLODIPine (NORVASC) tablet 5 mg  5 mg Oral Daily   • aspirin chewable tablet 81 mg  81 mg Oral Daily   • cinacalcet (SENSIPAR) tablet 30 mg  30 mg Oral Every Other Day   • [START ON 9/9/2023] cloNIDine (CATAPRES-TTS-3) 0.3 mg/24 hr TD weekly patch  1 patch Transdermal Weekly   • escitalopram (LEXAPRO) tablet 10 mg  10 mg Oral Daily   • heparin (porcine) subcutaneous injection 5,000 Units  5,000 Units Subcutaneous Q8H [facility]   • hydrALAZINE (APRESOLINE) injection 15 mg  15 mg Intravenous Q15 Min PRN    And   • niCARdipine (CARDENE) 25 mg (STANDARD CONCENTRATION) in sodium chloride 0.9% 250 mL  1-15 mg/hr Intravenous Continuous PRN   • insulin glargine (LANTUS) subcutaneous injection 10 Units 0.1 mL  10 Units Subcutaneous HS   • insulin lispro (HumaLOG) 100 units/mL subcutaneous injection 1-5 Units  1-5 Units Subcutaneous TID AC   • insulin lispro (HumaLOG) 100 units/mL subcutaneous injection 3 Units  3 Units Subcutaneous TID With Meals   • labetalol (NORMODYNE) injection 10 mg  10 mg Intravenous Q10 Min PRN   • labetalol (NORMODYNE) tablet 300 mg  300 mg Oral Q12H [address]   • lactated ringers infusion  75 mL/hr Intravenous Continuous   • lidocaine (LIDODERM) 5 % patch 2 patch  2 patch Topical Daily   • ondansetron (ZOFRAN) injection 4 mg  4 mg Intravenous Q6H PRN   • oxyCODONE (ROXICODONE) IR tablet 5 mg  5 mg Oral Q8H PRN   • oxyCODONE (ROXICODONE) split tablet 2.5 mg  2.5 mg Oral Q8H PRN   • sodium chloride 0.9 % bolus 500 mL  500 mL Intravenous Once PRN    Followed by   • phenylephrine (STACEY-SYNEPHRINE) 50 mg (STANDARD CONCENTRATION) in sodium chloride 0.9% 250 mL   mcg/min Intravenous Continuous PRN   • polyethylene glycol (MIRALAX) packet 17 g  17 g Oral Daily   • pravastatin (PRAVACHOL) tablet 80 mg  80 mg Oral Daily With Dinner   • senna (SENOKOT) tablet 8.6 mg  1 tablet Oral Daily   • ticagrelor (BRILINTA) tablet 90 mg  90 mg Oral Q12H [address]   • traZODone (DESYREL) tablet 50 mg  50 mg Oral HS         Physical Exam:    General appearance: alert  Neurologic: expressive aphasia, M/S and CN grossly intact, left neck incision C/D/I, soft  Lungs: clear to auscultation bilaterally  Heart: S1, S2 normal  Abdomen: soft, non-tender; bowel sounds normal; no masses,  no organomegaly, abdominal ecchymosis  Extremities: extremities normal, warm and well-perfused; no cyanosis, clubbing, or edema, left groin soft          Edilma Youngblood, DO  9/8/2023

## 2023-09-08 NOTE — PHYSICAL THERAPY NOTE
PHYSICAL THERAPY TREATMENT  NAME:  Papito Celestin  DATE: 09/08/23    AGE:   70 y.o. Mrn:   [id number]  ADMIT DX:  Primary hypertension [I10]  CVA (cerebral vascular accident) ([address]) [I63.9]  Stroke-like symptoms [R29.90]    Past Medical History:   Diagnosis Date    Arthritis     Chronic kidney disease     Diabetes mellitus ([address])     Endometriosis     High cholesterol     Hypertension     Kidney disease, chronic, stage III (moderate, EGFR 30-59 ml/min) ([facility])     Lumbar disc herniation     Neuropathy     Peripheral vascular disease ([facility])     Pneumonia     Shoulder injury     left    Spinal stenosis     Stroke ([address]) 2015    Memory loss     Past Surgical History:   Procedure Laterality Date    CARDIAC CATHETERIZATION      CAROTID STENT Left 9/7/2023    Procedure: Sandeep Asa;  Surgeon: Dorota Barksdale MD;  [facility];  Service: Vascular    COLONOSCOPY      FL RETROGRADE PYELOGRAM  4/6/2020    FRACTURE SURGERY Right     ankle    OVARIAN CYST SURGERY      NE CYSTO BLADDER W/URETERAL CATHETERIZATION Bilateral 4/6/2020    Procedure: CYSTOSCOPY WITH RETROGRADE PYELOGRAM;  Surgeon: Andrzej Dent MD;  Location[facility];  Service: Urology    NE CYSTO W/INSERT URETERAL STENT Right 4/6/2020    Procedure: INSERTION STENT URETERAL;  Surgeon: Andrzej Dent MD;  [facility];  Service: Urology    NE CYSTO W/REMOVAL OF TUMORS SMALL N/A 4/6/2020    Procedure: TRANSURETHRAL RESECTION OF BLADDER TUMOR (TURBT); Surgeon: Andrzej Dent MD;  [facility];  Service: Urology    ROTATOR CUFF REPAIR Left     TONSILLECTOMY         Length Of Stay: 5     09/08/23 1355   PT Last Visit   PT Visit Date 09/08/23   Note Type   Note Type Treatment   End of Consult   Patient Position at End of Consult Bedside chair; All needs within reach;Bed/Chair alarm activated   Pain Assessment   Pain Assessment Tool 0-10   Pain Score No Pain   Restrictions/Precautions   Weight Bearing Precautions Per Order No   Other Precautions Impulsive;Cognitive; Chair Alarm; Bed Alarm;Multiple lines; Fall Risk;Pain;O2  (aphasia;)   General   Chart Reviewed Yes   Additional Pertinent History pt s/p TCAR yesterday 9/7/23   Family/Caregiver Present No   Cognition   Overall Cognitive Status Impaired   Arousal/Participation Alert; Responsive; Cooperative   Attention Within functional limits   Orientation Level Oriented to person;Oriented to place;Oriented to situation   Memory Decreased recall of precautions;Decreased recall of recent events;Decreased short term memory   Following Commands Follows one step commands with increased time or repetition   Comments pleasant and cooperative; highly motivated- improvements in speech noted- continued frustration w/ aphasia- required inc time + demonstration for processing   Bed Mobility   Supine to Sit 3  Moderate assistance   Additional items Assist x 1; Increased time required;Verbal cues;LE management   Additional Comments seated balance EOB F- stable vitals   Transfers   Sit to Stand 3  Moderate assistance   Additional items Assist x 1; Increased time required;Verbal cues   Stand to Sit 3  Moderate assistance   Additional items Assist x 1; Increased time required;Verbal cues   Ambulation/Elevation   Gait pattern R Hemiparesis; Improper Weight shift; Forward Flexion;Decreased foot clearance   Gait Assistance 3  Moderate assist   Additional items Assist x 1   Distance 4' assist for advancement of R LE; knee block for stabilization in stance   Balance   Static Sitting Fair   Dynamic Sitting Fair -   Static Standing Poor +   Dynamic Standing Poor   Ambulatory Poor   Endurance Deficit   Endurance Deficit Yes   Endurance Deficit Description fatigue R hemiparesis w/ inc R LE instability fatigue   Activity Tolerance   Activity Tolerance Patient limited by fatigue   Medical Staff Made Aware OT/ CM for care coordiantion   Nurse Made Aware yes   Exercises   Neuro re-ed standing dynamic balance at table w/ functional reaching- PT working on trunk and R pelvic / knee stability w/ verbal and tactile cues. standing toleracne 4 min 22 secs prior to requiring rest break. Assessment   Prognosis Good   Problem List Decreased strength;Decreased range of motion;Decreased endurance; Impaired balance;Decreased mobility; Decreased coordination;Decreased cognition; Impaired judgement;Decreased safety awareness; Impaired sensation; Impaired tone;Obesity   Assessment Pt seen for PT tx session s/p TCAR 9/7/23. Pt w/ improved transfers and gait quality from prior sessions; significant improved standing tolerance during functional tasks/ reaching; improved speech and aphasia from prior session. Pt highly motivated and w/ excellent potential to progress and achieve PTgoals w/ ongoing skilled comprehensive rehab program.   Goals   Patient Goals to get better   STG Expiration Date 09/18/23   PT Treatment Day 1   Plan   Treatment/Interventions ADL retraining;Functional transfer training;LE strengthening/ROM; Elevations; Therapeutic exercise; Endurance training;Cognitive reorientation;Patient/family training;Equipment eval/education; Bed mobility;Gait training; Compensatory technique education;Spoke to nursing;Spoke to case management;Spoke to advanced practitioner;OT   Progress Progressing toward goals   PT Frequency 3-5x/wk   Recommendation   PT Discharge Recommendation Post acute rehabilitation services   AM-PAC Basic Mobility Inpatient   Turning in Flat Bed Without Bedrails 3   Lying on Back to Sitting on Edge of Flat Bed Without Bedrails 2   Moving Bed to Chair 2   Standing Up From Chair Using Arms 2   Walk in Room 2   Climb 3-5 Stairs With Railing 1   Basic Mobility Inpatient Raw Score 12   Basic Mobility Standardized Score 32.23   Highest Level Of Mobility   -[facility] Goal 4: Move to chair/commode   JH-HLM Achieved 5: Stand (1 or more minutes)       The patient's AM-PAC Basic Mobility Inpatient Short Form Raw Score is 12.  A Raw score of less than or equal to 16 suggests the patient may benefit from discharge to post-acute rehabilitation services. Please also refer to the recommendation of the Physical Therapist for safe discharge planning.             Gloria Block, PT

## 2023-09-09 VITALS
RESPIRATION RATE: 22 BRPM | DIASTOLIC BLOOD PRESSURE: 89 MMHG | HEART RATE: 85 BPM | HEIGHT: 58 IN | OXYGEN SATURATION: 98 % | SYSTOLIC BLOOD PRESSURE: 183 MMHG | WEIGHT: 158.4 LBS | TEMPERATURE: 97.6 F | BODY MASS INDEX: 33.25 KG/M2

## 2023-09-09 LAB
ANION GAP SERPL CALCULATED.3IONS-SCNC: 8 MMOL/L
BUN SERPL-MCNC: 43 MG/DL (ref 5–25)
CALCIUM SERPL-MCNC: 8.4 MG/DL (ref 8.4–10.2)
CHLORIDE SERPL-SCNC: 107 MMOL/L (ref 96–108)
CO2 SERPL-SCNC: 24 MMOL/L (ref 21–32)
CREAT SERPL-MCNC: 1.82 MG/DL (ref 0.6–1.3)
GFR SERPL CREATININE-BSD FRML MDRD: 26 ML/MIN/1.73SQ M
GLUCOSE SERPL-MCNC: 100 MG/DL (ref 65–140)
GLUCOSE SERPL-MCNC: 104 MG/DL (ref 65–140)
GLUCOSE SERPL-MCNC: 120 MG/DL (ref 65–140)
GLUCOSE SERPL-MCNC: 94 MG/DL (ref 65–140)
POTASSIUM SERPL-SCNC: 3.9 MMOL/L (ref 3.5–5.3)
SODIUM SERPL-SCNC: 139 MMOL/L (ref 135–147)

## 2023-09-09 PROCEDURE — 99239 HOSP IP/OBS DSCHRG MGMT >30: CPT | Performed by: INTERNAL MEDICINE

## 2023-09-09 PROCEDURE — 99232 SBSQ HOSP IP/OBS MODERATE 35: CPT | Performed by: INTERNAL MEDICINE

## 2023-09-09 PROCEDURE — 82948 REAGENT STRIP/BLOOD GLUCOSE: CPT

## 2023-09-09 PROCEDURE — 80048 BASIC METABOLIC PNL TOTAL CA: CPT | Performed by: INTERNAL MEDICINE

## 2023-09-09 RX ORDER — OXYCODONE HYDROCHLORIDE 5 MG/1
2.5 TABLET ORAL EVERY 8 HOURS PRN
Refills: 0
Start: 2023-09-09 | End: 2023-09-19

## 2023-09-09 RX ORDER — OXYCODONE HYDROCHLORIDE 5 MG/1
5 TABLET ORAL EVERY 8 HOURS PRN
Refills: 0
Start: 2023-09-09 | End: 2023-09-19

## 2023-09-09 RX ORDER — INSULIN GLARGINE 100 [IU]/ML
8 INJECTION, SOLUTION SUBCUTANEOUS
Qty: 10 ML | Refills: 0
Start: 2023-09-09

## 2023-09-09 RX ORDER — SENNOSIDES 8.6 MG
8.6 TABLET ORAL DAILY
Refills: 0
Start: 2023-09-10

## 2023-09-09 RX ORDER — ESCITALOPRAM OXALATE 10 MG/1
10 TABLET ORAL DAILY
Refills: 0
Start: 2023-09-10

## 2023-09-09 RX ORDER — LABETALOL 300 MG/1
300 TABLET, FILM COATED ORAL EVERY 12 HOURS SCHEDULED
Refills: 0
Start: 2023-09-09

## 2023-09-09 RX ORDER — INSULIN GLARGINE 100 [IU]/ML
8 INJECTION, SOLUTION SUBCUTANEOUS
Status: DISCONTINUED | OUTPATIENT
Start: 2023-09-09 | End: 2023-09-09 | Stop reason: HOSPADM

## 2023-09-09 RX ORDER — POLYETHYLENE GLYCOL 3350 17 G/17G
17 POWDER, FOR SOLUTION ORAL DAILY
Refills: 0
Start: 2023-09-10

## 2023-09-09 RX ADMIN — CLONIDINE 0.3 MG: 0.3 PATCH TRANSDERMAL at 08:30

## 2023-09-09 RX ADMIN — ACETAMINOPHEN 975 MG: 325 TABLET, FILM COATED ORAL at 14:35

## 2023-09-09 RX ADMIN — SODIUM BICARBONATE 650 MG TABLET 650 MG: at 12:23

## 2023-09-09 RX ADMIN — SENNOSIDES 8.6 MG: 8.6 TABLET, FILM COATED ORAL at 08:32

## 2023-09-09 RX ADMIN — POLYETHYLENE GLYCOL 3350 17 G: 17 POWDER, FOR SOLUTION ORAL at 08:31

## 2023-09-09 RX ADMIN — ACETAMINOPHEN 975 MG: 325 TABLET, FILM COATED ORAL at 05:33

## 2023-09-09 RX ADMIN — SODIUM BICARBONATE 650 MG TABLET 650 MG: at 08:32

## 2023-09-09 RX ADMIN — HEPARIN SODIUM 5000 UNITS: 5000 INJECTION INTRAVENOUS; SUBCUTANEOUS at 05:34

## 2023-09-09 RX ADMIN — LABETALOL HYDROCHLORIDE 300 MG: 200 TABLET, FILM COATED ORAL at 08:32

## 2023-09-09 RX ADMIN — TICAGRELOR 90 MG: 90 TABLET ORAL at 08:32

## 2023-09-09 RX ADMIN — AMLODIPINE BESYLATE 5 MG: 5 TABLET ORAL at 08:32

## 2023-09-09 RX ADMIN — INSULIN LISPRO 3 UNITS: 100 INJECTION, SOLUTION INTRAVENOUS; SUBCUTANEOUS at 08:34

## 2023-09-09 RX ADMIN — ESCITALOPRAM OXALATE 10 MG: 10 TABLET ORAL at 08:32

## 2023-09-09 RX ADMIN — ASPIRIN 81 MG CHEWABLE TABLET 81 MG: 81 TABLET CHEWABLE at 08:32

## 2023-09-09 RX ADMIN — HEPARIN SODIUM 5000 UNITS: 5000 INJECTION INTRAVENOUS; SUBCUTANEOUS at 14:35

## 2023-09-09 NOTE — ASSESSMENT & PLAN NOTE
Patient with left carotid artery disease stroke  · CTA head and neck reveals extensive intracranial atherosclerotic disease stenosis extensive disease bilateral carotid arteries  · Evaluated by vascular surgery, the patient is at high risk for hemorrhagic conversion given brain MRI finding of petechial microhemorrhage  · Status post left TCAR without complication on 9/7  · Continue aspirin, Brilinta and statin

## 2023-09-09 NOTE — INCIDENTAL FINDINGS
The following findings require follow up:  Radiographic finding   Finding: Right kidney cystic lesion   Follow up required: Contrast-enhanced CT scan or MRI using renal mass protocol   Follow up should be done within 4-8 week(s)    Please notify the following clinician to assist with the follow up:   Violetta Rosenthal

## 2023-09-09 NOTE — ASSESSMENT & PLAN NOTE
Lab Results   Component Value Date    EGFR 29 09/08/2023    EGFR 29 09/07/2023    EGFR 20 09/06/2023    CREATININE 1.66 (H) 09/08/2023    CREATININE 1.67 (H) 09/07/2023    CREATININE 2.26 (H) 09/06/2023     · Nephrology is following, baseline creatinine 1.5-1.8  · Stable  · Monitor

## 2023-09-09 NOTE — ASSESSMENT & PLAN NOTE
Initially recommended permissive hypertension per neurology   · Currently on amlodipine, clonidine patch and labetalol  · Restart losartan when cleared by nephrology  · Monitor routinely

## 2023-09-09 NOTE — NURSING NOTE
Report given to [facility]. IV removed. Pt transported in wheelchair by transporter. Pt transported with packet including AVS, progress notes, and radiology discs x3.

## 2023-09-09 NOTE — ASSESSMENT & PLAN NOTE
Patient presents with acute onset right upper and lower extremity weakness while at SNF undergoing physical therapy for recent stroke in mid-August. She was a stroke alert and was evaluated by neurology in the ED. Patient symptomatically improving at time of admission. Brain MRI with scattered multifocal infarctions predominantly in the left occipital lobe as well as in the left frontal and parietal lobes in the watershed territory, slightly increased in size and number from the prior study indicative of trace crescendo infarctions/progressive infarctions associated with previously present infarctions in these region.   · Per neurology suspect acute stroke secondary to symptomatic ICA stenosis and recommending vascular surgery evaluation   · Continue aspirin, Brilinta, pravastatin  · S/p  left TCAR on 9/ 7  · Monitor  · PT OT recommending rehab  · [facility] today

## 2023-09-09 NOTE — ASSESSMENT & PLAN NOTE
Patient presents with acute onset right upper and lower extremity weakness while at SNF undergoing physical therapy for recent stroke in mid-August. She was a stroke alert and was evaluated by neurology in the ED. Patient symptomatically improving at time of admission. Brain MRI with scattered multifocal infarctions predominantly in the left occipital lobe as well as in the left frontal and parietal lobes in the watershed territory, slightly increased in size and number from the prior study indicative of trace crescendo infarctions/progressive infarctions associated with previously present infarctions in these region.   · Per neurology suspect acute stroke secondary to symptomatic ICA stenosis and recommending vascular surgery evaluation   · Continue aspirin, Brilinta, pravastatin  · S/p  left TCAR on 9/ 7  · Monitor  · PT OT recommending rehab

## 2023-09-09 NOTE — ASSESSMENT & PLAN NOTE
Initially recommended permissive hypertension per neurology   · Currently on amlodipine, clonidine patch and labetalol  · Restart losartan  · Monitor routinely

## 2023-09-09 NOTE — QUICK NOTE
Renal function remains stable. [facility] for d/c from renal perspective. May consider restarting losartan if BP remains elevated. Have discussed with [facility] attending regarding stability for discharge.

## 2023-09-09 NOTE — ASSESSMENT & PLAN NOTE
Cystic lesion in the right kidney on renal ultrasound    Patient will need a contrast-enhanced CT scan or MRI using renal mass protocol  at some point when she is stable   Likely outpatient

## 2023-09-09 NOTE — PROGRESS NOTES
[facility]  Progress Note  Name: Shiv Rush  MRN: [id number]  [facility] Date of Admission: 9/3/2023   Date of Service: 9/9/2023 I Hospital Day: 6    Assessment/Plan   * Acute CVA (cerebrovascular accident) [facility])  Assessment & Plan  Patient presents with acute onset right upper and lower extremity weakness while at SNF undergoing physical therapy for recent stroke in mid-August. She was a stroke alert and was evaluated by neurology in the ED. Patient symptomatically improving at time of admission. Brain MRI with scattered multifocal infarctions predominantly in the left occipital lobe as well as in the left frontal and parietal lobes in the watershed territory, slightly increased in size and number from the prior study indicative of trace crescendo infarctions/progressive infarctions associated with previously present infarctions in these region.   · Per neurology suspect acute stroke secondary to symptomatic ICA stenosis and recommending vascular surgery evaluation   · Continue aspirin, Brilinta, pravastatin  · S/p  left TCAR on 9/ 7  · Monitor  · PT OT recommending rehab    Symptomatic carotid artery stenosis, left  Assessment & Plan  Patient with left carotid artery disease stroke  · CTA head and neck reveals extensive intracranial atherosclerotic disease stenosis extensive disease bilateral carotid arteries  · Evaluated by vascular surgery, the patient is at high risk for hemorrhagic conversion given brain MRI finding of petechial microhemorrhage  · Status post left TCAR without complication on 9/7  · Continue aspirin, Brilinta and statin    Aphasia as late effect of cerebrovascular accident (CVA)  Assessment & Plan  · Supportive cares    Acute renal failure superimposed on stage 3 chronic kidney disease [facility])  Assessment & Plan  Lab Results   Component Value Date    EGFR 29 09/08/2023    EGFR 29 09/07/2023    EGFR 20 09/06/2023    CREATININE 1.66 (H) 09/08/2023    CREATININE 1.67 (H) 09/07/2023    CREATININE 2.26 (H) 09/06/2023     · Nephrology is following, baseline creatinine 1.5-1.8  · Stable  · Monitor    Renal cyst  Assessment & Plan  Cystic lesion in the right kidney on renal ultrasound    Patient will need a contrast-enhanced CT scan or MRI using renal mass protocol  at some point when she is stable   Likely outpatient    Aortoiliac occlusive disease ([address])  Assessment & Plan  · Continue aspirin statin  · Outpatient vascular surgery follow-up    Type 2 diabetes mellitus, without long-term current use of insulin [facility])  Assessment & Plan  Lab Results   Component Value Date    HGBA1C 7.3 (H) 09/03/2023       Recent Labs     09/08/23  1550 09/08/23  2102 09/09/23  0640 09/09/23  0702   POCGLU 113 105 100 94       Blood Sugar Average: Last 72 hrs:  (P) [id number]     Uncontrolled  · Hold glipizide while inpatient  · Continue Lantus at bedtime and Humalog with meals  · Monitor Accu-Cheks and adjust as needed   · Avoid hypoglycemia  · Hypoglycemia protocol in place    Depression, recurrent (HCC)  Assessment & Plan  · Continue trazodone  · Started on Lexapro by neurology    Hyperlipidemia, unspecified  Assessment & Plan  History of myalgias to statin  · Patient on pravastatin and tolerating, continue same     Obesity (BMI 30-39. 9)  Assessment & Plan  · Therapeutic diet and lifestyle modification encouraged    Hypertension  Assessment & Plan  Initially recommended permissive hypertension per neurology   · Currently on amlodipine, clonidine patch and labetalol  · Restart losartan when cleared by nephrology  · Monitor routinely            VTE Pharmacologic Prophylaxis: VTE Score: 11 High Risk (Score >/= 5) - Pharmacological DVT Prophylaxis Ordered: heparin. Sequential Compression Devices Ordered. Patient Centered Rounds: I performed bedside rounds with nursing staff today.   Discussions with Specialists or Other Care Team Provider:     Education and Discussions with Family / Patient: Attempted to update  (son) via phone. Unable to contact. Total Time Spent on Date of Encounter in care of patient: 35 minutes This time was spent on one or more of the following: performing physical exam; counseling and coordination of care; obtaining or reviewing history; documenting in the medical record; reviewing/ordering tests, medications or procedures; communicating with other healthcare professionals and discussing with patient's family/caregivers. Current Length of Stay: 6 day(s)  Current Patient Status: Inpatient   Certification Statement: The patient will continue to require additional inpatient hospital stay due to Awaiting rehab  Discharge Plan: Awaiting rehab placement    Code Status: Level 3 - DNAR and DNI    Subjective:   Patient seen and examined  Comfortable in chair  No event overnight  No nausea vomiting or diarrhea    Objective:     Vitals:   Temp (24hrs), Av.5 °F (36.4 °C), Min:97.2 °F (36.2 °C), Max:97.7 °F (36.5 °C)    Temp:  [97.2 °F (36.2 °C)-97.7 °F (36.5 °C)] 97.6 °F (36.4 °C)  HR:  [57-85] 85  Resp:  [14-18] 16  BP: (118-187)/() 183/89  SpO2:  [95 %-100 %] 98 %  Body mass index is 33.11 kg/m². Input and Output Summary (last 24 hours):      Intake/Output Summary (Last 24 hours) at 2023 1014  Last data filed at 2023 0533  Gross per 24 hour   Intake 1020 ml   Output 310 ml   Net 710 ml       Physical Exam:   Physical Exam   Patient is awake alert no acute distress   Comfortable in chair  Baseline expressive aphasia  Lung clear to auscultation bilateral  Heart positive S1-S2 no murmur  Abdomen soft nontender  Lower extremities no edema    Additional Data:     Labs:  Results from last 7 days   Lab Units 23  0500 23  0511 23  0528   WBC Thousand/uL 8.41   < > 7.13   HEMOGLOBIN g/dL 11.1*   < > 13.2   I STAT HEMOGLOBIN   --    < >  --    HEMATOCRIT % 32.3*   < > 37.4   HEMATOCRIT, ISTAT   --    < >  -- PLATELETS Thousands/uL 228   < > 274   NEUTROS PCT %  --   --  60   LYMPHS PCT %  --   --  21   MONOS PCT %  --   --  12   EOS PCT %  --   --  5    < > = values in this interval not displayed. Results from last 7 days   Lab Units 09/08/23  0500   SODIUM mmol/L 137   POTASSIUM mmol/L 4.2   CHLORIDE mmol/L 108   CO2 mmol/L 19*   BUN mg/dL 30*   CREATININE mg/dL 1.66*   ANION GAP mmol/L 10   CALCIUM mg/dL 8.3*   GLUCOSE RANDOM mg/dL 116     Results from last 7 days   Lab Units 09/08/23  0500   INR  0.96     Results from last 7 days   Lab Units 09/09/23  0702 09/09/23  0640 09/08/23  2102 09/08/23  1550 09/08/23  1100 09/08/23  0609 09/07/23  2057 09/07/23  1532 09/07/23  1044 09/07/23  0618 09/06/23  1551 09/06/23  1124   POC GLUCOSE mg/dl 94 100 105 113 132 128 111 118 148* 102 96 148*     Results from last 7 days   Lab Units 09/03/23  1314   HEMOGLOBIN A1C % 7.3*           Lines/Drains:  Invasive Devices     Peripheral Intravenous Line  Duration           Peripheral IV 09/07/23 Left Wrist 1 day                      Imaging: No pertinent imaging reviewed.     Recent Cultures (last 7 days):         Last 24 Hours Medication List:   Current Facility-Administered Medications   Medication Dose Route Frequency Provider Last Rate   • acetaminophen  650 mg Oral Q6H PRN Harvey Munoz, DO     • acetaminophen  975 mg Oral Q8H [address] Harvey Rey DO     • aluminum-magnesium hydroxide-simethicone  30 mL Oral Q6H PRN Harvey Rey DO     • amLODIPine  5 mg Oral Daily Harvey Rey DO     • aspirin  81 mg Oral Daily Harvey Rey DO     • cinacalcet  30 mg Oral Every Other Day Sacha Baker, DO     • cloNIDine  1 patch Transdermal Weekly Harvey Rey DO     • escitalopram  10 mg Oral Daily Harvey Rey DO     • heparin (porcine)  5,000 Units Subcutaneous Q8H [address] Harvey Rey, DO     • insulin glargine  10 Units Subcutaneous HS Harvey Rey DO     • insulin lispro  1-5 Units Subcutaneous TID  Harvey Rey DO     • insulin lispro  3 Units Subcutaneous TID With Meals Harvey Milton, DO     • labetalol  10 mg Intravenous Q10 Min PRN Harvey Milton, DO     • labetalol  300 mg Oral Q12H [address] Harvey Milton, DO     • lidocaine  2 patch Topical Daily Harvey Rey, DO     • ondansetron  4 mg Intravenous Q6H PRN Harvey Rey DO     • oxyCODONE  5 mg Oral Q8H PRN Harvey Rey DO     • oxyCODONE  2.5 mg Oral Q8H PRN Harvey Rey DO     • polyethylene glycol  17 g Oral Daily Harvey Rey, DO     • pravastatin  80 mg Oral Daily With Dinner Junious Mo, DO     • senna  1 tablet Oral Daily Harvey Rey DO     • sodium bicarbonate  650 mg Oral TID after meals Julia Howard MD     • ticagrelor  90 mg Oral Q12H [address] Harvey Milton, DO     • traZODone  50 mg Oral HS Junious Mo, DO          Today, Patient Was Seen By: Gonzalez Mac DO    **Please Note: This note may have been constructed using a voice recognition system. **

## 2023-09-09 NOTE — PLAN OF CARE
Problem: MOBILITY - ADULT  Goal: Maintain or return to baseline ADL function  Description: INTERVENTIONS:  -  Assess patient's ability to carry out ADLs; assess patient's baseline for ADL function and identify physical deficits which impact ability to perform ADLs (bathing, care of mouth/teeth, toileting, grooming, dressing, etc.)  - Assess/evaluate cause of self-care deficits   - Assess range of motion  - Assess patient's mobility; develop plan if impaired  - Assess patient's need for assistive devices and provide as appropriate  - Encourage maximum independence but intervene and supervise when necessary  - Involve family in performance of ADLs  - Assess for home care needs following discharge   - Consider OT consult to assist with ADL evaluation and planning for discharge  - Provide patient education as appropriate  Outcome: Progressing  Goal: Maintains/Returns to pre admission functional level  Description: INTERVENTIONS:  - Perform BMAT or MOVE assessment daily.   - Set and communicate daily mobility goal to care team and patient/family/caregiver. - Collaborate with rehabilitation services on mobility goals if consulted  - Perform Range of Motion  times a day. - Reposition patient every  hours.   - Dangle patient  times a day  - Stand patient  times a day  - Ambulate patient  times a day  - Out of bed to chair  times a day   - Out of bed for meals  times a day  - Out of bed for toileting  - Record patient progress and toleration of activity level   Outcome: Progressing     Problem: PAIN - ADULT  Goal: Verbalizes/displays adequate comfort level or baseline comfort level  Description: Interventions:  - Encourage patient to monitor pain and request assistance  - Assess pain using appropriate pain scale  - Administer analgesics based on type and severity of pain and evaluate response  - Implement non-pharmacological measures as appropriate and evaluate response  - Consider cultural and social influences on pain and pain management  - Notify physician/advanced practitioner if interventions unsuccessful or patient reports new pain  Outcome: Progressing     Problem: INFECTION - ADULT  Goal: Absence or prevention of progression during hospitalization  Description: INTERVENTIONS:  - Assess and monitor for signs and symptoms of infection  - Monitor lab/diagnostic results  - Monitor all insertion sites, i.e. indwelling lines, tubes, and drains  - Monitor endotracheal if appropriate and nasal secretions for changes in amount and color  - [place] appropriate cooling/warming therapies per order  - Administer medications as ordered  - Instruct and encourage patient and family to use good hand hygiene technique  - Identify and instruct in appropriate isolation precautions for identified infection/condition  Outcome: Progressing  Goal: Absence of fever/infection during neutropenic period  Description: INTERVENTIONS:  - Monitor WBC    Outcome: Progressing     Problem: SAFETY ADULT  Goal: Maintain or return to baseline ADL function  Description: INTERVENTIONS:  -  Assess patient's ability to carry out ADLs; assess patient's baseline for ADL function and identify physical deficits which impact ability to perform ADLs (bathing, care of mouth/teeth, toileting, grooming, dressing, etc.)  - Assess/evaluate cause of self-care deficits   - Assess range of motion  - Assess patient's mobility; develop plan if impaired  - Assess patient's need for assistive devices and provide as appropriate  - Encourage maximum independence but intervene and supervise when necessary  - Involve family in performance of ADLs  - Assess for home care needs following discharge   - Consider OT consult to assist with ADL evaluation and planning for discharge  - Provide patient education as appropriate  Outcome: Progressing  Goal: Maintains/Returns to pre admission functional level  Description: INTERVENTIONS:  - Perform BMAT or MOVE assessment daily.   - Set and communicate daily mobility goal to care team and patient/family/caregiver. - Collaborate with rehabilitation services on mobility goals if consulted  - Perform Range of Motion  times a day. - Reposition patient every  hours.   - Dangle patient  times a day  - Stand patient  times a day  - Ambulate patient  times a day  - Out of bed to chair  times a day   - Out of bed for meals  times a day  - Out of bed for toileting  - Record patient progress and toleration of activity level   Outcome: Progressing  Goal: Patient will remain free of falls  Description: INTERVENTIONS:  - Educate patient/family on patient safety including physical limitations  - Instruct patient to call for assistance with activity   - Consult OT/PT to assist with strengthening/mobility   - Keep Call bell within reach  - Keep bed low and locked with side rails adjusted as appropriate  - Keep care items and personal belongings within reach  - Initiate and maintain comfort rounds  - Make Fall Risk Sign visible to staff  - Offer Toileting every  Hours, in advance of need  - Initiate/Maintain alarm  - Obtain necessary fall risk management equipment:   - Apply yellow socks and bracelet for high fall risk patients  - Consider moving patient to room near nurses station  Outcome: Progressing     Problem: DISCHARGE PLANNING  Goal: Discharge to home or other facility with appropriate resources  Description: INTERVENTIONS:  - Identify barriers to discharge w/patient and caregiver  - Arrange for needed discharge resources and transportation as appropriate  - Identify discharge learning needs (meds, wound care, etc.)  - Arrange for interpretive services to assist at discharge as needed  - Refer to Case Management Department for coordinating discharge planning if the patient needs post-hospital services based on physician/advanced practitioner order or complex needs related to functional status, cognitive ability, or social support system  Outcome: Progressing Problem: Knowledge Deficit  Goal: Patient/family/caregiver demonstrates understanding of disease process, treatment plan, medications, and discharge instructions  Description: Complete learning assessment and assess knowledge base. Interventions:  - Provide teaching at level of understanding  - Provide teaching via preferred learning methods  Outcome: Progressing     Problem: Prexisting or High Potential for Compromised Skin Integrity  Goal: Skin integrity is maintained or improved  Description: INTERVENTIONS:  - Identify patients at risk for skin breakdown  - Assess and monitor skin integrity  - Assess and monitor nutrition and hydration status  - Monitor labs   - Assess for incontinence   - Turn and reposition patient  - Assist with mobility/ambulation  - Relieve pressure over bony prominences  - Avoid friction and shearing  - Provide appropriate hygiene as needed including keeping skin clean and dry  - Evaluate need for skin moisturizer/barrier cream  - Collaborate with interdisciplinary team   - Patient/family teaching  - Consider wound care consult   Outcome: Progressing     Problem: NEUROSENSORY - ADULT  Goal: Achieves stable or improved neurological status  Description: INTERVENTIONS  - Monitor and report changes in neurological status  - Monitor vital signs such as temperature, blood pressure, glucose, and any other labs ordered   - Initiate measures to prevent increased intracranial pressure  - Monitor for seizure activity and implement precautions if appropriate      Outcome: Progressing     Problem: Nutrition/Hydration-ADULT  Goal: Nutrient/Hydration intake appropriate for improving, restoring or maintaining nutritional needs  Description: Monitor and assess patient's nutrition/hydration status for malnutrition. Collaborate with interdisciplinary team and initiate plan and interventions as ordered. Monitor patient's weight and dietary intake as ordered or per policy.  Utilize nutrition screening tool and intervene as necessary. Determine patient's food preferences and provide high-protein, high-caloric foods as appropriate.      INTERVENTIONS:  - Monitor oral intake, urinary output, labs, and treatment plans  - Assess nutrition and hydration status and recommend course of action  - Evaluate amount of meals eaten  - Assist patient with eating if necessary   - Allow adequate time for meals  - Recommend/ encourage appropriate diets, oral nutritional supplements, and vitamin/mineral supplements  - Order, calculate, and assess calorie counts as needed  - Recommend, monitor, and adjust tube feedings and TPN/PPN based on assessed needs  - Assess need for intravenous fluids  - Provide specific nutrition/hydration education as appropriate  - Include patient/family/caregiver in decisions related to nutrition  Outcome: Progressing

## 2023-09-09 NOTE — PROGRESS NOTES
Progress Note - Hosea Sky 70 y.o. female MRN: [id number]    Unit/Bed#: [facility] 502-01 Encounter: [id number]      CC: diabetes f/u    Subjective:   Hosea Sky is a 70y.o. year old female with type 2 diabetes melitis, admitted for recurrent ischemic CVA, status post TCAR on 9/7. Endocrinology consulted for the management of type 2 diabetes mellitus. Feels well. No complaints. No hypoglycemia. Objective:     Vitals: Blood pressure (!) 183/89, pulse 85, temperature 97.6 °F (36.4 °C), temperature source Oral, resp. rate 22, height 4' 10" (1.473 m), weight 71.9 kg (158 lb 6.4 oz), SpO2 98 %. ,Body mass index is 33.11 kg/m². Intake/Output Summary (Last 24 hours) at 9/9/2023 1056  Last data filed at 9/9/2023 0800  Gross per 24 hour   Intake 1350 ml   Output 460 ml   Net 890 ml       Physical Exam:  General Appearance: awake, appears stated age and cooperative  Head: Normocephalic, without obvious abnormality, atraumatic  Extremities: moves all extremities  Skin: Skin color and temperature normal.   Pulm: no labored breathing    Lab, Imaging and other studies: I have personally reviewed pertinent reports. POC Glucose (mg/dl)   Date Value   09/09/2023 94   09/09/2023 100   09/08/2023 105   09/08/2023 113   09/08/2023 132   09/08/2023 128   09/07/2023 111   09/07/2023 118   09/07/2023 148 (H)   09/07/2023 102       Assessment and plan:  Type 2 diabetes mellitus  HbA1c 7.3  Home regimen: Glipizide 5 mg daily  Inpatient regimen: Lantus 10 units at bedtime, Humalog 3 units with meals    Recommendations:  - Given that the patient has below target blood glucose for inpatient, would recommend reducing Lantus to 8 units from 10 units at bedtime  - We will discontinue Humalog with meals as patient has not been receiving it and has been maintaining her blood sugars  - Continue with correctional scale algorithm 1 with meals  -We will continue to monitor blood glucose and make adjustments as needed.   - Monitor for hypoglycemia and treat according to the protocol      Portions of the record may have been created with voice recognition software. Please Tigertext questions to the clinician covering the "[id number]" Role. Thank you.

## 2023-09-09 NOTE — DISCHARGE SUMMARY
[facility]  Discharge- Cezar Palm 1945, 70 y.o. female MRN: [id number]  Unit/Bed#: [facility] 502-01 Encounter: [id number]  Primary Care Provider: Thomas Hanks MD   Date and time admitted to hospital: 9/3/2023 12:47 PM    * Acute CVA (cerebrovascular accident) [facility])  Assessment & Plan  Patient presents with acute onset right upper and lower extremity weakness while at SNF undergoing physical therapy for recent stroke in mid-August. She was a stroke alert and was evaluated by neurology in the ED. Patient symptomatically improving at time of admission. Brain MRI with scattered multifocal infarctions predominantly in the left occipital lobe as well as in the left frontal and parietal lobes in the watershed territory, slightly increased in size and number from the prior study indicative of trace crescendo infarctions/progressive infarctions associated with previously present infarctions in these region.   · Per neurology suspect acute stroke secondary to symptomatic ICA stenosis and recommending vascular surgery evaluation   · Continue aspirin, Brilinta, pravastatin  · S/p  left TCAR on 9/ 7  · Monitor  · PT OT recommending rehab  · [facility] today    Symptomatic carotid artery stenosis, left  Assessment & Plan  Patient with left carotid artery disease stroke  · CTA head and neck reveals extensive intracranial atherosclerotic disease stenosis extensive disease bilateral carotid arteries  · Evaluated by vascular surgery, the patient is at high risk for hemorrhagic conversion given brain MRI finding of petechial microhemorrhage  · Status post left TCAR without complication on 9/7  · Continue aspirin, Brilinta and statin  · Discharge to rehab today    Aphasia as late effect of cerebrovascular accident (CVA)  Assessment & Plan  · Supportive cares    Acute renal failure superimposed on stage 3 chronic kidney disease [facility])  Assessment & Plan  Lab Results   Component Value Date EGFR 26 09/09/2023    EGFR 29 09/08/2023    EGFR 29 09/07/2023    CREATININE 1.82 (H) 09/09/2023    CREATININE 1.66 (H) 09/08/2023    CREATININE 1.67 (H) 09/07/2023     · Nephrology is following, baseline creatinine 1.5-1.8  · Stable  · Monitor    Renal cyst  Assessment & Plan  Cystic lesion in the right kidney on renal ultrasound    Patient will need a contrast-enhanced CT scan or MRI using renal mass protocol  at some point when she is stable   Likely outpatient    Aortoiliac occlusive disease ([address])  Assessment & Plan  · Continue aspirin statin  · Outpatient vascular surgery follow-up    Type 2 diabetes mellitus, without long-term current use of insulin [facility])  Assessment & Plan  Lab Results   Component Value Date    HGBA1C 7.3 (H) 09/03/2023       Recent Labs     09/08/23  2102 09/09/23  0640 09/09/23  0702 09/09/23  1040   POCGLU 105 100 94 120       Blood Sugar Average: Last 72 hrs:  (P) 121.7588390257260194     Uncontrolled  · Hold glipizide while inpatient  · Continue Lantus at bedtime   · Monitor Accu-Cheks and adjust as needed   · Avoid hypoglycemia  · Hypoglycemia protocol in place    Depression, recurrent (HCC)  Assessment & Plan  · Continue trazodone  · Started on Lexapro by neurology    Hyperlipidemia, unspecified  Assessment & Plan  History of myalgias to statin  · Patient on pravastatin and tolerating, continue same     Obesity (BMI 30-39. 9)  Assessment & Plan  · Therapeutic diet and lifestyle modification encouraged    Hypertension  Assessment & Plan  Initially recommended permissive hypertension per neurology   · Currently on amlodipine, clonidine patch and labetalol  · Restart losartan  · Monitor routinely       Medical Problems     Resolved Problems  Date Reviewed: 9/9/2023   None       Discharging Physician / Practitioner: Myrna Holm DO  PCP: Wei Evans MD  Admission Date:   Admission Orders (From admission, onward)     Ordered        09/03/23 1443 [address]  Once Discharge Date: 09/09/23    Consultations During Hospital Stay:  · Vascular surgery  · Neurology  · Nephrology  · Endocrine    Procedures Performed:   · Brain MRI scattered multifocal infarction  · Left TCAR    Significant Findings / Test Results:   · As above    Incidental Findings:   · Cystic renal lesion    Test Results Pending at Discharge (will require follow up):   · none     Outpatient Tests Requested:  · Contrast-enhanced CT scan or MRI using renal mass protocol    Complications:  none    Reason for Admission:   Acute CVA    Hospital Course:   Melanie Hendrickson is a 70 y.o. female patient who originally presented to the hospital on 9/3/2023 due to expressive aphasia and right-sided weakness  With recent acute CVA of left occipital lobe and left MCA territory with residual expressive aphasia and right hemianopsia  CTA showed multiple areas of severe stenosis most prominent within the ICA bulb bilaterally  Brain MRI consistent with a scattered multifocal infarctions  Patient was evaluated by vascular surgery, neurology, nephrology and endocrine  She underwent left carotid artery  stenting without complication    Currently she is in stable condition, cleared by vascular to be discharged to [facility]    Please see above list of diagnoses and related plan for additional information. Condition at Discharge: stable    Discharge Day Visit / Exam:   * Please refer to separate progress note for these details *    Discussion with Family: per CM. Discharge instructions/Information to patient and family:   See after visit summary for information provided to patient and family. Provisions for Follow-Up Care:  See after visit summary for information related to follow-up care and any pertinent home health orders. Disposition:   Acute Rehab at [facility]    Planned Readmission: no     Discharge Statement:  I spent 40 minutes discharging the patient. This time was spent on the day of discharge.  I had direct contact with the patient on the day of discharge. Greater than 50% of the total time was spent examining patient, answering all patient questions, arranging and discussing plan of care with patient as well as directly providing post-discharge instructions. Additional time then spent on discharge activities. Discharge Medications:  See after visit summary for reconciled discharge medications provided to patient and/or family.       **Please Note: This note may have been constructed using a voice recognition system**

## 2023-09-09 NOTE — CASE MANAGEMENT
Case Management Discharge Planning Note    Patient name Hosea Sky  Location [address] [place] [address] [address] [address] MRN [id number]  : 1945 Date 2023       Current Admission Date: 9/3/2023  Current Admission Diagnosis:Acute CVA (cerebrovascular accident) [facility])   Patient Active Problem List    Diagnosis Date Noted   • Smoking 2023   • Renal cyst 2023   • Primary hypertension 2023   • Acute CVA (cerebrovascular accident) ([address]) 2023   • Acute cystitis 2023   • Aphasia as late effect of cerebrovascular accident (CVA) 2023   • Dysarthria 08/15/2023   • Stage 3b chronic kidney disease ([address])    • Hypertensive urgency 2023   • Aortoiliac occlusive disease ([address]) 10/11/2022   • History of gastrointestinal stromal tumor (GIST) 2022   • Secondary hyperparathyroidism of renal origin ([address]) 2022   • 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   • Type 2 diabetes mellitus, without long-term current use of insulin ([address]) 2021   • Type 2 diabetes mellitus with diabetic peripheral angiopathy without gangrene ([address]) 2021   • Embolism and 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 ([address]) 2021   • Depression, recurrent ([address]) 2021   • Obesity, morbid ([address]) 2021   • Stage 4 chronic kidney disease ([address]) 2020   • Hypertensive kidney disease with stage 4 chronic kidney disease ([address]) 2020   • Cervical radiculopathy 2020   • Malignant neoplasm of overlapping sites of bladder ([address]) 2020   • Stenosis of left anterior descending artery Mid LAD 50-60% stenosis 2020   • 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 01/14/2020   • Mesenteric artery stenosis (HCC) 01/14/2020   • Positive cardiac stress test  small, mildly severe, partially reversible myocardial perfusion defect of anterior and inferior wall  11/12/2019   • Abnormal CT of the chest suspicious for an infectious or inflammatory bronchiolitis.  11/07/2019   • Celiac artery stenosis (HCC) >70% stenosis in the celiac trunk 11/05/2019   • Median arcuate ligament syndrome ([address]) 11/05/2019   • Atherosclerosis of renal artery ([address]) 07/01/2019   • Aortoiliac stenosis, left (HCC) 02/25/2019   • Spondylosis of lumbar region without myelopathy or radiculopathy 01/29/2019   • Lumbosacral spondylosis without myelopathy 01/29/2019   • Statin intolerance 01/22/2019   • Lumbar disc herniation 01/22/2019   • Herniated lumbar intervertebral disc 01/22/2019   • Chronic GERD 12/04/2017   • Acute renal failure superimposed on stage 3 chronic kidney disease ([address]) 12/04/2017   • Claudication in peripheral vascular disease ([address]) 12/04/2017   • Gout 12/04/2017   • Hx-TIA (transient ischemic attack) 12/04/2017   • Hyperlipidemia associated with type 2 diabetes mellitus ([address]) 12/04/2017   • Hypertension associated with diabetes ([address]) 12/04/2017   • Osteoarthritis 12/04/2017   • Obesity (BMI 30-39.9) 12/04/2017   • Right renal artery stenosis ([address]) 12/04/2017   • 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   • Hyperlipidemia, unspecified 12/04/2017   • Vitamin D deficiency 09/08/2016   • Basilar artery stenosis 06/22/2016   • Type 2 diabetes mellitus with diabetic nephropathy ([address]) 12/19/2015   • Hypercholesteremia 12/19/2015   • Hypertension 12/19/2015   • Polyneuropathy 12/19/2015   • CVA (cerebral vascular accident) ([address]) 11/09/2015      LOS (days): 6  Geometric Mean LOS (GMLOS) (days): 4.50  Days to GMLOS:-1.5     OBJECTIVE:  Risk of Unplanned Readmission Score: 32.51 Current admission status: Inpatient   Preferred Pharmacy:   [address]  [address]  [address] [address]  Phone: [phone] Fax: [phone]    [id number] [address] [facility] [address] [facility] [place] [address]  Phone: [phone] Fax: [phone]    Primary Care Provider: Hero Tripp MD    Primary Insurance: [facility] REP  Secondary Insurance:     DISCHARGE DETAILS:    Treatment Team Recommendation: Short Term Rehab  Discharge Destination Plan[de-identified] Short Term Rehab  Transport at Discharge : Wheelchair van  Dispatcher Contacted: Yes  Transported by [employer] and Unit #): Pol[place]germán  ETA of Transport (Date): 09/09/23  ETA of Transport (Time): 1330  Transport Service Arrived: Yes  Transfer Mode: [address] [place] [address] Name, [address] [place] [address] Street : [address] [place] [address] ,  [address]  Receiving Facility/Agency Phone Number: [phone]  Facility/Agency Fax Number: [phone]     Pt, medical team, and [facility]) aware of p/u time.

## 2023-09-09 NOTE — QUICK NOTE
Delvis Pardo is a 72-year-old female who originally admitted for left CVA now postop day 2 from a TCAR. Patient is doing well from a vascular standpoint. Examined this morning neurological symptoms seem to be improving. PE: Surgical wound is clean dry and intact. Still some dysphagia no neurological deficits.     Plan:    Okay for discharge from a vascular standpoint  We will sign off at this time please reach out with any questions or concerns

## 2023-09-09 NOTE — ASSESSMENT & PLAN NOTE
Lab Results   Component Value Date    EGFR 26 09/09/2023    EGFR 29 09/08/2023    EGFR 29 09/07/2023    CREATININE 1.82 (H) 09/09/2023    CREATININE 1.66 (H) 09/08/2023    CREATININE 1.67 (H) 09/07/2023     · Nephrology is following, baseline creatinine 1.5-1.8  · Stable  · Monitor

## 2023-09-09 NOTE — ASSESSMENT & PLAN NOTE
Lab Results   Component Value Date    HGBA1C 7.3 (H) 09/03/2023       Recent Labs     09/08/23  2102 09/09/23  0640 09/09/23  0702 09/09/23  1040   POCGLU 105 100 94 120       Blood Sugar Average: Last 72 hrs:  (P) [id number]     Uncontrolled  · Hold glipizide while inpatient  · Continue Lantus at bedtime   · Monitor Accu-Cheks and adjust as needed   · Avoid hypoglycemia  · Hypoglycemia protocol in place

## 2023-09-09 NOTE — ASSESSMENT & PLAN NOTE
Lab Results   Component Value Date    HGBA1C 7.3 (H) 09/03/2023       Recent Labs     09/08/23  1550 09/08/23  2102 09/09/23  0640 09/09/23  0702   POCGLU 113 105 100 94       Blood Sugar Average: Last 72 hrs:  (P) [id number]     Uncontrolled  · Hold glipizide while inpatient  · Continue Lantus at bedtime and Humalog with meals  · Monitor Accu-Cheks and adjust as needed   · Avoid hypoglycemia  · Hypoglycemia protocol in place

## 2023-09-11 NOTE — UTILIZATION REVIEW
NOTIFICATION OF ADMISSION DISCHARGE   This is a Notification of Discharge from [address] [place] [address]. Please be advised that this patient has been discharge from our facility. Below you will find the admission and discharge date and time including the patient’s disposition. UTILIZATION REVIEW CONTACT:  Ida Rodríguez  Utilization   Network Utilization Review Department  Phone: [phone] x carefully listen to the prompts. All voicemails are confidential.  Email: [email or web address]     ADMISSION INFORMATION  PRESENTATION DATE: 9/3/2023 12:47 PM  OBERVATION ADMISSION DATE:   INPATIENT ADMISSION DATE: 9/3/23  3:20 PM   DISCHARGE DATE: 9/9/2023  3:30 PM   DISPOSITION:Non [facility] Acute Rehab    IMPORTANT INFORMATION:  Send all requests for admission clinical reviews, approved or denied determinations and any other requests to dedicated fax number below belonging to the campus where the patient is receiving treatment.  List of dedicated fax numbers:  [place] DENIALS (Administrative/Medical Necessity) [phone] 2303 [facility] (Maternity/NICU/Pediatrics) [phone]   [facility] [phone]   [facility] [phone] 1636 [facility] [phone]   [address] [place] [address] [phone]   [facility] [phone]   [address] [place] [address] [address] [place] [address] [phone]   506 [facility] [phone]   3445 [facility] [phone] 2720 [facility] 3000 [address] [phone]

## 2023-09-13 ENCOUNTER — TELEPHONE (OUTPATIENT)
Dept: VASCULAR SURGERY | Facility: CLINIC | Age: 78
End: 2023-09-13

## 2023-09-13 NOTE — TELEPHONE ENCOUNTER
Vascular Nurse Navigator Post Op Call    Procedure: Left - L TCAR    Date of Procedure: 9/7/23    Surgeon:    Gina Manzano MD - Primary     * Natalia Short DO - Assisting    Discharge Date: 9/9/23    Discharge Disposition:  [facility]     Change in Vision?: No    Change in Speech?: No    Weakness?: No    Uncontrolled Pain?: No    Hoarseness?: No    Trouble Swallowing?: No    Incision Concerns?: No    Anticoagulation pt was discharged on post op?: Aspirin and Ticagrelor (Brillanta)    Statin pt was discharged on post op?: Pravachol (pravastatin)    Bleeding?: No    Fever/chills?: No      Reviewed discharge instructions and incision care with patient. NEXT OFFICE VISIT SCHEDULED: 9/21/23 at 2:15 pm with Hulen Cooks at [facility] Available?: Yes - facility to arrange transportation      Any further questions/concerns? Spoke with Lily Mendoza, nurse at [facility], in regards to above. She stated patient is doing good since discharge. She stated that her expressive aphasia is improving. Reviewed incision care with her - wash daily with soap and water. Reviewed discharge medications - Aspirin and Brillinta. Lily Mendoza stated that patient has an anticipated discharge date of 9/25/23 from rehab. All questions answered. No concerns expressed at this time.

## 2023-09-14 ENCOUNTER — TELEPHONE (OUTPATIENT)
Dept: NEPHROLOGY | Facility: CLINIC | Age: 78
End: 2023-09-14

## 2023-09-14 DIAGNOSIS — N18.4 STAGE 4 CHRONIC KIDNEY DISEASE (HCC): Primary | ICD-10-CM

## 2023-09-14 NOTE — TELEPHONE ENCOUNTER
Dr. Guillermina Lesches called the office to push patients appointment  For later due to patient is still in rehab

## 2023-09-14 NOTE — TELEPHONE ENCOUNTER
----- Message from Hanna Srinivasan MD sent at 9/14/2023  8:31 AM EDT -----  Regarding: RE: Kidney cyst follow-up  Please schedule with Kelli with next available slot for hospital follow-up    Please order BMP CKD labs      ----- Message -----  From: Sonia our: 9/14/2023   8:14 AM EDT  To: Hanna Srinivasan MD  Subject: FW: Kidney cyst follow-up                        Hi ,  Can you please review patient's chart and advise how soon we can add patient to your schedule date and time. Please Advise  ----- Message -----  From: Dl Castaneda MD  Sent: 9/13/2023   5:28 PM EDT  To: Hanna Srinivasan MD; #  Subject: Kidney cyst follow-up                            Hello Dr. Paige Ritchie, just wanted to make you aware that Molly Dias was seen at [address] [place] [address] when she presented with strokelike symptoms. She was seen for acute kidney injury post administration of iodinated contrast.  Peak creatinine was 2.4 and was at baseline at time of discharge. She had kidney imaging in the hospital that showed a complex cystic lesion for which she would need further imaging preferably an MRI. Just wanted to make you aware of this finding. Let know if you have any questions regarding her hospitalization. Thank you. Clinical team, can you please arrange a follow-up BMP. Thank you.

## 2023-09-14 NOTE — TELEPHONE ENCOUNTER
Called spoke with Eric Martino patient's nurse at [facility], advised pateint has been scheduled with [facility], [address] [place] [address]. for hospital fu on 09/20/23 @ 4:30pm, also advised Eric Martino patient will need BMP labs done prior to scheduled appt. Eric Martino understood and is okay with it.

## 2023-09-22 ENCOUNTER — RA CDI HCC (OUTPATIENT)
Dept: OTHER | Facility: HOSPITAL | Age: 78
End: 2023-09-22

## 2023-09-22 ENCOUNTER — TELEPHONE (OUTPATIENT)
Dept: NEUROLOGY | Facility: CLINIC | Age: 78
End: 2023-09-22

## 2023-09-22 NOTE — TELEPHONE ENCOUNTER
Post CVA Discharge Follow Up  Hospitalization: 9/3/23-9/9/23    According to chart, patient discharged to [facility]. Called facility. Spoke with Rafal Palacios, unit clerk. Offered to schedule stroke HFU. Rafal Palacios declined and requested for our office to contact the patient directly to schedule since she will be discharged from [facility] to home tomorrow on 9/23/23. Spoke with the patient's nurse, Elzbieta Jade. She reports the patient is doing well and has noticed much improvement. Since discharge, patient has not experienced any new or worsening stroke-like symptoms. Ambulation / ADLs:  Patient mobilizing via walker as a contact guard. She continues to require assistance with ADLs. Patient maintained on a regular consistency diet. She remains continent at this time. Medication Review:  Reviewed medications with them. There have not been any medication changes since discharge from the hospital. No reported missed doses, medication side effects, or signs of bleeding. Last reported /67  Last reported blood glucose 92 (before lunch today)    She denies any questions or concerns at this time. Routing message to scheduling staff to reach out to patient on Monday 9/25/23 to schedule stroke HFU.

## 2023-09-22 NOTE — PROGRESS NOTES
E11.51, E11.69  [facility] coding opportunities          Chart Reviewed number of suggestions sent to Provider: 2     Patients Insurance     Medicare Insurance: [facility]

## 2023-09-26 ENCOUNTER — OFFICE VISIT (OUTPATIENT)
Dept: VASCULAR SURGERY | Facility: CLINIC | Age: 78
End: 2023-09-26

## 2023-09-26 VITALS
HEIGHT: 58 IN | DIASTOLIC BLOOD PRESSURE: 60 MMHG | WEIGHT: 151.2 LBS | SYSTOLIC BLOOD PRESSURE: 146 MMHG | OXYGEN SATURATION: 99 % | BODY MASS INDEX: 31.74 KG/M2 | HEART RATE: 69 BPM

## 2023-09-26 DIAGNOSIS — I65.22 SYMPTOMATIC CAROTID ARTERY STENOSIS, LEFT: Primary | ICD-10-CM

## 2023-09-26 DIAGNOSIS — I73.9 CLAUDICATION IN PERIPHERAL VASCULAR DISEASE (HCC): ICD-10-CM

## 2023-09-26 PROCEDURE — 99024 POSTOP FOLLOW-UP VISIT: CPT

## 2023-09-26 RX ORDER — AMLODIPINE BESYLATE 5 MG/1
TABLET ORAL
COMMUNITY
Start: 2023-09-22

## 2023-09-26 NOTE — PROGRESS NOTES
Assessment/Plan:    Symptomatic carotid artery stenosis, left  Patient is reporting continued a prior improvement in her stroke symptoms postoperatively. She notes that her dysarthria is significantly improved and she is no longer experiencing right-sided weakness. Patient is currently denying any new stroke symptoms including vision loss, facial droop, dysphagia, or unilateral weakness. She is also denying any fever, chills, redness, warmth, or drainage from incisions    Plan:  -Continued improvement in residual right-sided weakness and dysarthria.  -Left neck incision is clean, dry, intact without signs of infection or hematoma.  -Continue medical optimization with aspirin, Brilinta, and pravastatin. -Repeat carotid duplex in 4 weeks, 6 weeks postoperatively  -Patient instructed to report to the emergency department for any new CVA/TIA symptoms  -Follow-up with Dr. Jose Linn after repeat carotid duplex. Claudication in peripheral vascular disease ([address])  Bilateral lower extremity claudication, right greater than left, occurring after ambulating 80 to 100 feet. Patient last LEAD completed in August 2022 notes severe multilevel disease bilaterally with ABIs in the severe to ischemic range. Plan:  -We discussed the pathophysiology of peripheral arterial disease as well as contributing lifestyle factors.  -We discussed the results of last LEAD and AOIL at length.  -Continue medical optimization with aspirin, Brilinta, and pravastatin.  -Will repeat LEAD and AOIL at the same time as carotid duplex.  -Instructed patient to notify office of any worsening claudication, rest pain, or tissue loss. -Instructed patient to report to the ED for any symptoms of acute limb ischemia (color/temperature change, motor/sensory loss, tissue loss). Diagnoses and all orders for this visit:    Symptomatic carotid artery stenosis, left  -     VAS carotid complete study;  Future    Claudication in peripheral vascular disease ([address])  -     VAS lower limb arterial duplex, complete bilateral; Future  -     Cancel: VAS ABDOMINAL AORTA/ILIACS; WITH LISA'S; Future  -     VAS ABDOMINAL AORTA/ILIACS; WITH LISA'S; Future    Other orders  -     amLODIPine (NORVASC) 5 mg tablet          Subjective:      Patient ID: Araseli Ingram is a 70 y.o. female. Patient is a 70year old female, former smoker, with PMH type II DM, HLD, HTN, CAD, aortoiliac occlusive disease, PAD, CKD 4, obesity, and L MCA and PCA CVA. Patient is presenting to the vascular surgery office for initial post-op evaluation s/p L TCAR 9/7/23 Raffy Zaira). Accompanied by her son. Patient is reporting continued a prior improvement in her stroke symptoms postoperatively. She notes that her dysarthria is significantly improved and she is no longer experiencing right-sided weakness. Patient is currently denying any new stroke symptoms including vision loss, facial droop, dysphagia, or unilateral weakness. She is also denying any fever, chills, redness, warmth, or drainage from incisions. Of note, patient is inquiring about lower extremity arterial duplex. It appears that she has had studies ordered within the last year however these have not been completed due to her recent CVA. She reports continued bilateral lower extremity pain with ambulating 80 to 100 feet, worse on the right. She is currently denying any motor/sensory loss, color/temperature change, rest pain, or tissue loss. The following portions of the patient's history were reviewed and updated as appropriate: allergies, current medications, past family history, past medical history, past social history, past surgical history and problem list.    Review of Systems   Constitutional: Negative. HENT: Negative. Eyes: Positive for visual disturbance. Respiratory: Negative. Cardiovascular: Negative. Gastrointestinal: Negative. Endocrine: Negative. Genitourinary: Negative.     Musculoskeletal: Positive for arthralgias. Skin: Negative. Allergic/Immunologic: Negative. Neurological: Negative. Hematological: Negative. Psychiatric/Behavioral: Negative. Objective:    /60 (BP Location: Right arm, Patient Position: Sitting, Cuff Size: Large)   Pulse 69   Ht 4' 10" (1.473 m)   Wt 68.6 kg (151 lb 3.2 oz)   LMP  (LMP Unknown)   SpO2 99%   BMI 31.60 kg/m²        Physical Exam  Vitals reviewed. Constitutional:       General: She is not in acute distress. Appearance: Normal appearance. HENT:      Head: Normocephalic and atraumatic. Neck:      Vascular: Carotid bruit present. Cardiovascular:      Rate and Rhythm: Normal rate and regular rhythm. Pulses:           Carotid pulses are 2+ on the right side and 2+ on the left side. Radial pulses are 2+ on the right side and 2+ on the left side. Dorsalis pedis pulses are detected w/ Doppler on the right side and detected w/ Doppler on the left side. Posterior tibial pulses are detected w/ Doppler on the right side and detected w/ Doppler on the left side. Heart sounds: Normal heart sounds. No murmur heard. Pulmonary:      Effort: Pulmonary effort is normal. No respiratory distress. Breath sounds: Normal breath sounds. Musculoskeletal:         General: No swelling or tenderness. Cervical back: Normal range of motion and neck supple. No tenderness. Right lower leg: No edema. Left lower leg: No edema. Skin:     General: Skin is warm and dry. Capillary Refill: Capillary refill takes less than 2 seconds. Neurological:      General: No focal deficit present. Mental Status: She is alert and oriented to person, place, and time. Cranial Nerves: No cranial nerve deficit, dysarthria or facial asymmetry. Sensory: Sensation is intact. Motor: Motor function is intact. No weakness.    Psychiatric:         Mood and Affect: Mood normal.         Behavior: Behavior normal.       I have reviewed and made appropriate changes to the review of systems input by the medical assistant. Vitals:    09/26/23 1006 09/26/23 1013   BP: 100/50 146/60   BP Location: Left arm Right arm   Patient Position: Sitting Sitting   Cuff Size: Large Large   Pulse: 69    SpO2: 99%    Weight: 68.6 kg (151 lb 3.2 oz)    Height: 4' 10" (1.473 m)        Patient Active Problem List   Diagnosis   • Basilar artery stenosis   • CVA (cerebral vascular accident) ([address])   • Chronic GERD   • Acute renal failure superimposed on stage 3 chronic kidney disease (HCC)   • Claudication in peripheral vascular disease (HCC)   • Type 2 diabetes mellitus with diabetic nephropathy (HCC)   • Gout   • Hx-TIA (transient ischemic attack)   • Hypercholesteremia   • Hyperlipidemia associated with type 2 diabetes mellitus ([address])   • Hypertension   • Hypertension associated with diabetes ([address])   • Osteoarthritis   • Obesity (BMI 30-39. 9)   • Polyneuropathy   • Right renal artery stenosis (HCC)   • Vertebrobasilar artery syndrome   • Vitamin D deficiency   • Statin intolerance   • Lumbar disc herniation   • Spondylosis of lumbar region without myelopathy or radiculopathy   • Aortoiliac stenosis, left (HCC)   • Lumbosacral spondylosis without myelopathy   • Hyperlipidemia, unspecified   • Herniated lumbar intervertebral disc   • Atherosclerosis of renal artery (HCC)   • Celiac artery stenosis (HCC) >70% stenosis in the celiac trunk   • Abnormal CT of the chest suspicious for an infectious or inflammatory bronchiolitis. • Positive cardiac stress test  small, mildly severe, partially reversible myocardial perfusion defect of anterior and inferior wall    • Femoral artery stenosis, right (HCC) 50-75% stenosis in the common femoral artery.    • Mesenteric artery stenosis (HCC)   • Median arcuate ligament syndrome (HCC)   • Atherosclerosis of native arteries of extremities with intermittent claudication, bilateral legs (HCC)   • Symptomatic carotid artery stenosis, left   • Pulmonary nodule 7 mm groundglass opacity in the right upper lobe, unchanged since October 2019   • Stenosis of left anterior descending artery Mid LAD 50-60% stenosis   • 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   • Malignant neoplasm of overlapping sites of bladder [facility])   • Cervical radiculopathy   • Stage 4 chronic kidney disease (HCC)   • Hypertensive kidney disease with stage 4 chronic kidney disease (HCC)   • Embolism and 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)   • Depression, recurrent (HCC)   • Obesity, morbid ([address])   • Type 2 diabetes mellitus, without long-term current use of insulin (HCC)   • Type 2 diabetes mellitus with diabetic peripheral angiopathy without gangrene (HCC)   • Gastrointestinal stromal tumor (GIST) ([address])   • History of gastrointestinal stromal tumor (GIST)   • Secondary hyperparathyroidism of renal origin ([address])   • Aortoiliac occlusive disease ([address])   • Hypertensive urgency   • Dysarthria   • Stage 3b chronic kidney disease ([address])   • Aphasia as late effect of cerebrovascular accident (CVA)   • Acute cystitis   • Acute CVA (cerebrovascular accident) ([address])   • Primary hypertension   • Smoking   • Renal cyst       Past Surgical History:   Procedure Laterality Date   • CARDIAC CATHETERIZATION     • CAROTID STENT Left 9/7/2023    Procedure: L TCAR;  Surgeon: Alanna Mtz MD;  [facility]: [facility];  Service: Vascular   • COLONOSCOPY     • FL RETROGRADE PYELOGRAM  4/6/2020   • FRACTURE SURGERY Right     ankle   • IR CAROTID STENT  9/7/2023   • OVARIAN CYST SURGERY     • WV CYSTO BLADDER W/URETERAL CATHETERIZATION Bilateral 4/6/2020    Procedure: CYSTOSCOPY WITH RETROGRADE PYELOGRAM;  Surgeon: Fidelia Yin MD;  Location[facility];  Service: Urology   • WV CYSTO W/INSERT URETERAL STENT Right 4/6/2020    Procedure: INSERTION STENT URETERAL;  Surgeon: Fidelia Yin MD;  [facility]; Service: Urology   • AR CYSTO W/REMOVAL OF TUMORS SMALL N/A 4/6/2020    Procedure: TRANSURETHRAL RESECTION OF BLADDER TUMOR (TURBT); Surgeon: Ute Whipple MD;  [facility];  Service: Urology   • ROTATOR CUFF REPAIR Left    • TONSILLECTOMY         Family History   Problem Relation Age of Onset   • Hypertension Mother    • Heart disease Mother         Valvular   • Hyperlipidemia Mother    • Hypertension Father    • Heart defect Father         Cardiomegaly   • Stroke Sister         Cerebrovascular Accident   • Arthritis Brother    • Other Brother         Back Disorder       Social History     Socioeconomic History   • Marital status: /Civil Union     Spouse name: Not on file   • Number of children: Not on file   • Years of education: Not on file   • Highest education level: Not on file   Occupational History   • Occupation: retired   Tobacco Use   • Smoking status: Former     Packs/day: 2.00     Years: 40.00     Total pack years: 80.00     Types: Cigarettes     Start date: 1966     Quit date: 7/27/2020     Years since quitting: 3.1   • Smokeless tobacco: Never   Vaping Use   • Vaping Use: Never used   Substance and Sexual Activity   • Alcohol use: Never   • Drug use: Never   • Sexual activity: Not Currently     Partners: Male   Other Topics Concern   • Not on file   Social History Narrative    Occasional Caffeine Consumption     Social Determinants of Health     Financial Resource Strain: Not on file   Food Insecurity: No Food Insecurity (9/5/2023)    Hunger Vital Sign    • Worried About Running Out of Food in the Last Year: Never true    • Ran Out of Food in the Last Year: Never true   Transportation Needs: No Transportation Needs (9/5/2023)    PRAPARE - Transportation    • Lack of Transportation (Medical): No    • Lack of Transportation (Non-Medical):  No   Physical Activity: Not on file   Stress: Not on file   Social Connections: Not on file   Intimate Partner Violence: Not on file   Housing Stability: Low Risk  (9/5/2023)    Housing Stability Vital Sign    • Unable to Pay for Housing in the Last Year: No    • Number of Places Lived in the Last Year: 1    • Unstable Housing in the Last Year: No       Allergies   Allergen Reactions   • Fenofibrate Other (See Comments)      blood in urine  hx  Kidney Failure   • Colesevelam Other (See Comments)      leg pains   • Colestipol Itching and Other (See Comments)      Swelling lower legs   • Ezetimibe GI Intolerance   • Statins Myalgia         Current Outpatient Medications:   •  acetaminophen (TYLENOL) 500 mg tablet, Take 650 mg by mouth every 6 (six) hours as needed for mild pain, Disp: , Rfl:   •  allopurinol (ZYLOPRIM) 100 mg tablet, TAKE 1 TABLET BY MOUTH EVERY DAY, Disp: 90 tablet, Rfl: 2  •  amLODIPine (NORVASC) 5 mg tablet, , Disp: , Rfl:   •  aspirin 81 mg chewable tablet, Chew 81 mg daily, Disp: , Rfl:   •  cinacalcet (SENSIPAR) 30 mg tablet, Take 1 tablet (30 mg total) by mouth every other day, Disp: 45 tablet, Rfl: 2  •  cloNIDine (CATAPRES-TTS-3) 0.3 mg/24 hr, PLACE 1 PATCH (0.3 MG TOTAL) ON THE SKIN ONCE A WEEK, Disp: 12 patch, Rfl: 1  •  escitalopram (LEXAPRO) 10 mg tablet, Take 1 tablet (10 mg total) by mouth daily Do not start before September 10, 2023., Disp: , Rfl: 0  •  Glucagon 1 MG/0.2ML SOAJ, Inject 1 mg under the skin if needed (low blood sugar), Disp: , Rfl:   •  insulin glargine (LANTUS) 100 units/mL subcutaneous injection, Inject 8 Units under the skin daily at bedtime, Disp: 10 mL, Rfl: 0  •  insulin lispro (HumaLOG) 100 units/mL injection, Inject under the skin, Disp: , Rfl:   •  labetalol (NORMODYNE) 300 mg tablet, Take 1 tablet (300 mg total) by mouth every 12 (twelve) hours, Disp: , Rfl: 0  •  lidocaine (LIDODERM) 5 %, Apply 1 patch topically daily Remove & Discard patch within 12 hours or as directed by MD, Disp: , Rfl:   •  losartan (COZAAR) 100 MG tablet, Take 1 tablet (100 mg total) by mouth daily, Disp: 90 tablet, Rfl: 1  • pravastatin (PRAVACHOL) 80 mg tablet, Take 1 tablet (80 mg total) by mouth daily with dinner, Disp: 30 tablet, Rfl: 0  •  ticagrelor (BRILINTA) 90 MG, Take 1 tablet (90 mg total) by mouth every 12 (twelve) hours, Disp: 60 tablet, Rfl: 0  •  traZODone (DESYREL) 50 mg tablet, Take 1 tablet (50 mg total) by mouth daily at bedtime, Disp: 30 tablet, Rfl: 0    I have spent a total time of 30 minutes on 09/26/23 in caring for this patient including Diagnostic results, Prognosis, Risks and benefits of tx options, Instructions for management, Patient and family education, Importance of tx compliance, Risk factor reductions, Impressions, Counseling / Coordination of care, Documenting in the medical record, Reviewing / ordering tests, medicine, procedures   and Obtaining or reviewing history  .

## 2023-09-26 NOTE — ASSESSMENT & PLAN NOTE
Patient is reporting continued a prior improvement in her stroke symptoms postoperatively. She notes that her dysarthria is significantly improved and she is no longer experiencing right-sided weakness. Patient is currently denying any new stroke symptoms including vision loss, facial droop, dysphagia, or unilateral weakness. She is also denying any fever, chills, redness, warmth, or drainage from incisions    Plan:  -Continued improvement in residual right-sided weakness and dysarthria.  -Left neck incision is clean, dry, intact without signs of infection or hematoma.  -Continue medical optimization with aspirin, Brilinta, and pravastatin. -Repeat carotid duplex in 4 weeks, 6 weeks postoperatively  -Patient instructed to report to the emergency department for any new CVA/TIA symptoms  -Follow-up with Dr. Jeoffrey Eisenmenger after repeat carotid duplex.

## 2023-09-26 NOTE — TELEPHONE ENCOUNTER
2nd Attempt,     Called pt no answer, LMOM ON BOTH MOBILE AND OTHER PHONE LISTED IN CHART. SENDING A Zul[employer] MESSAGE AS WELL. Letter Sent. Thank you,     Bora Llanes         HFU/ [facility]/ ACUTE CVA    DC-9-9-23 TO FACILITY   PER NOTES DC FROM  FACILITY NOW HOME     Outpatient follow-up with neurovascular attending/REAGAN/resident in 6 to 8 weeks. No further outpatient neurological testing indicated at this time.

## 2023-09-26 NOTE — ASSESSMENT & PLAN NOTE
Bilateral lower extremity claudication, right greater than left, occurring after ambulating 80 to 100 feet. Patient last LEAD completed in August 2022 notes severe multilevel disease bilaterally with ABIs in the severe to ischemic range. Plan:  -We discussed the pathophysiology of peripheral arterial disease as well as contributing lifestyle factors.  -We discussed the results of last LEAD and AOIL at length.  -Continue medical optimization with aspirin, Brilinta, and pravastatin.  -Will repeat LEAD and AOIL at the same time as carotid duplex.  -Instructed patient to notify office of any worsening claudication, rest pain, or tissue loss. -Instructed patient to report to the ED for any symptoms of acute limb ischemia (color/temperature change, motor/sensory loss, tissue loss).

## 2023-09-28 ENCOUNTER — NEW PATIENT COMPREHENSIVE (OUTPATIENT)
Dept: URBAN - METROPOLITAN AREA CLINIC 6 | Facility: CLINIC | Age: 78
End: 2023-09-28

## 2023-09-28 DIAGNOSIS — Z79.4: ICD-10-CM

## 2023-09-28 DIAGNOSIS — H25.813: ICD-10-CM

## 2023-09-28 DIAGNOSIS — H43.813: ICD-10-CM

## 2023-09-28 DIAGNOSIS — H04.123: ICD-10-CM

## 2023-09-28 DIAGNOSIS — E11.311: ICD-10-CM

## 2023-09-28 LAB
LEFT EYE DIABETIC RETINOPATHY: POSITIVE
RIGHT EYE DIABETIC RETINOPATHY: NORMAL

## 2023-09-28 PROCEDURE — 92004 COMPRE OPH EXAM NEW PT 1/>: CPT

## 2023-09-28 PROCEDURE — 92134 CPTRZ OPH DX IMG PST SGM RTA: CPT

## 2023-09-28 ASSESSMENT — VISUAL ACUITY
OS_GLARE: 20/200
OU_CC: J2
OD_GLARE: 20/60
OD_CC: 20/40-1
OS_CC: 20/400

## 2023-09-28 ASSESSMENT — TONOMETRY
OD_IOP_MMHG: 14
OS_IOP_MMHG: 13

## 2023-09-29 ENCOUNTER — TELEPHONE (OUTPATIENT)
Dept: INTERNAL MEDICINE CLINIC | Facility: CLINIC | Age: 78
End: 2023-09-29

## 2023-09-29 DIAGNOSIS — E11.65 TYPE 2 DIABETES MELLITUS WITH HYPERGLYCEMIA, WITH LONG-TERM CURRENT USE OF INSULIN (HCC): Primary | ICD-10-CM

## 2023-09-29 DIAGNOSIS — Z79.4 TYPE 2 DIABETES MELLITUS WITH HYPERGLYCEMIA, WITH LONG-TERM CURRENT USE OF INSULIN (HCC): Primary | ICD-10-CM

## 2023-09-29 RX ORDER — PEN NEEDLE, DIABETIC 31 GX5/16"
NEEDLE, DISPOSABLE MISCELLANEOUS 2 TIMES DAILY
Qty: 30 EACH | Refills: 1 | Status: SHIPPED | OUTPATIENT
Start: 2023-09-29

## 2023-09-29 NOTE — TELEPHONE ENCOUNTER
Patient's son Margy Le) called - patient was in the hospital earlier this month and was put on Lantus but was not given pen needles. The pharmacist gave her some but will not have enough until appt with doctor on Monday 10/2. Please send in a script for pen needles to [address]

## 2023-10-02 ENCOUNTER — OFFICE VISIT (OUTPATIENT)
Dept: INTERNAL MEDICINE CLINIC | Facility: CLINIC | Age: 78
End: 2023-10-02
Payer: COMMERCIAL

## 2023-10-02 VITALS
WEIGHT: 150 LBS | HEIGHT: 58 IN | DIASTOLIC BLOOD PRESSURE: 68 MMHG | SYSTOLIC BLOOD PRESSURE: 138 MMHG | BODY MASS INDEX: 31.49 KG/M2 | OXYGEN SATURATION: 98 % | HEART RATE: 69 BPM

## 2023-10-02 DIAGNOSIS — E11.65 TYPE 2 DIABETES MELLITUS WITH HYPERGLYCEMIA, WITH LONG-TERM CURRENT USE OF INSULIN (HCC): Primary | ICD-10-CM

## 2023-10-02 DIAGNOSIS — I74.09 AORTOILIAC OCCLUSIVE DISEASE (HCC): ICD-10-CM

## 2023-10-02 DIAGNOSIS — Z86.73 HX-TIA (TRANSIENT ISCHEMIC ATTACK): ICD-10-CM

## 2023-10-02 DIAGNOSIS — Z79.4 TYPE 2 DIABETES MELLITUS WITH HYPERGLYCEMIA, WITH LONG-TERM CURRENT USE OF INSULIN (HCC): Primary | ICD-10-CM

## 2023-10-02 DIAGNOSIS — R47.01 APHASIA: ICD-10-CM

## 2023-10-02 PROCEDURE — 99214 OFFICE O/P EST MOD 30 MIN: CPT | Performed by: INTERNAL MEDICINE

## 2023-10-02 RX ORDER — INSULIN GLARGINE 100 [IU]/ML
INJECTION, SOLUTION SUBCUTANEOUS
COMMUNITY
Start: 2023-09-24

## 2023-10-02 NOTE — PROGRESS NOTES
Assessment/Plan:     Patient is here for hospital follow up, she has a PMH type II DM, HLD, HTN, CAD, aortoiliac occlusive disease, PAD, CKD 4, obesity, and L MCA and PCA CVA. She is accompanied by her son; recently discharged from the rehab center, feels like she is improving; has PT and OT few times a week; still with residual right sided weakness; she is s/p L TCAR 9/7/23  she continues to take aspirin, brilinta, and her statin; she continues to struggle with aphasia; Has some right outer eye bruising that she is following with ophthalmology for, will need records; she thinks it is r/t the testing they did at their office; Will be seeing endocrine in December, last a1c 7.3; continue lantus (can administer at 3:30 am, this is when her son wakes up as he works the night shift at his job); lateral wrist redness noted;    Quality Measures:     BMI Counseling: There is no height or weight on file to calculate BMI. The BMI is above normal. Nutrition recommendations include decreasing portion sizes and encouraging healthy choices of fruits and vegetables. Exercise recommendations include moderate physical activity 150 minutes/week. Rationale for BMI follow-up plan is due to patient being overweight or obese. Return in about 3 months (around 1/2/2024). No problem-specific Assessment & Plan notes found for this encounter. Diagnoses and all orders for this visit:    Type 2 diabetes mellitus with hyperglycemia, with long-term current use of insulin (HCC)  -     CBC and differential; Future  -     TSH, 3rd generation with Free T4 reflex; Future  -     Comprehensive metabolic panel; Future    Aphasia    Hx-TIA (transient ischemic attack)    Aortoiliac occlusive disease (HCC)  -     CBC and differential; Future  -     TSH, 3rd generation with Free T4 reflex; Future  -     Comprehensive metabolic panel;  Future    Other orders  -     Lantus SoloStar 100 units/mL SOPN; 9 UNITS SUBCUTANEOUS AT BEDTIME  - Zinc Oxide, Topical, 16 & 40 % KIT; Apply topically 2 (two) times a day Pressure Ulcer          Subjective:      Patient ID: Florian Hale is a 70 y.o. female. Here for hospital follow-up.       ALLERGIES:  Allergies   Allergen Reactions   • Fenofibrate Other (See Comments)      blood in urine  hx  Kidney Failure   • Colesevelam Other (See Comments)      leg pains   • Colestipol Itching and Other (See Comments)      Swelling lower legs   • Ezetimibe GI Intolerance   • Statins Myalgia       CURRENT MEDICATIONS:    Current Outpatient Medications:   •  acetaminophen (TYLENOL) 500 mg tablet, Take 650 mg by mouth every 6 (six) hours as needed for mild pain, Disp: , Rfl:   •  allopurinol (ZYLOPRIM) 100 mg tablet, TAKE 1 TABLET BY MOUTH EVERY DAY, Disp: 90 tablet, Rfl: 2  •  amLODIPine (NORVASC) 5 mg tablet, , Disp: , Rfl:   •  aspirin 81 mg chewable tablet, Chew 81 mg daily, Disp: , Rfl:   •  cinacalcet (SENSIPAR) 30 mg tablet, Take 1 tablet (30 mg total) by mouth every other day, Disp: 45 tablet, Rfl: 2  •  cloNIDine (CATAPRES-TTS-3) 0.3 mg/24 hr, PLACE 1 PATCH (0.3 MG TOTAL) ON THE SKIN ONCE A WEEK, Disp: 12 patch, Rfl: 1  •  escitalopram (LEXAPRO) 10 mg tablet, Take 1 tablet (10 mg total) by mouth daily Do not start before September 10, 2023., Disp: , Rfl: 0  •  Glucagon 1 MG/0.2ML SOAJ, Inject 1 mg under the skin if needed (low blood sugar), Disp: , Rfl:   •  insulin glargine (LANTUS) 100 units/mL subcutaneous injection, Inject 8 Units under the skin daily at bedtime, Disp: 10 mL, Rfl: 0  •  insulin lispro (HumaLOG) 100 units/mL injection, Inject under the skin, Disp: , Rfl:   •  Insulin Pen Needle (PEN NEEDLES 31GX5/16") 31G X 8 MM MISC, Use 2 (two) times a day, Disp: 30 each, Rfl: 1  •  labetalol (NORMODYNE) 300 mg tablet, Take 1 tablet (300 mg total) by mouth every 12 (twelve) hours, Disp: , Rfl: 0  •  Lantus SoloStar 100 units/mL SOPN, 9 UNITS SUBCUTANEOUS AT BEDTIME, Disp: , Rfl:   •  lidocaine (LIDODERM) 5 %, Apply 1 patch topically daily Remove & Discard patch within 12 hours or as directed by MD, Disp: , Rfl:   •  losartan (COZAAR) 100 MG tablet, Take 1 tablet (100 mg total) by mouth daily, Disp: 90 tablet, Rfl: 1  •  pravastatin (PRAVACHOL) 80 mg tablet, Take 1 tablet (80 mg total) by mouth daily with dinner, Disp: 30 tablet, Rfl: 0  •  ticagrelor (BRILINTA) 90 MG, Take 1 tablet (90 mg total) by mouth every 12 (twelve) hours, Disp: 60 tablet, Rfl: 0  •  traZODone (DESYREL) 50 mg tablet, Take 1 tablet (50 mg total) by mouth daily at bedtime, Disp: 30 tablet, Rfl: 0  •  Zinc Oxide, Topical, 16 & 40 % KIT, Apply topically 2 (two) times a day Pressure Ulcer, Disp: , Rfl:     ACTIVE PROBLEM LIST:  Patient Active Problem List   Diagnosis   • Basilar artery stenosis   • CVA (cerebral vascular accident) ([address])   • Chronic GERD   • Acute renal failure superimposed on stage 3 chronic kidney disease (HCC)   • Claudication in peripheral vascular disease (HCC)   • Type 2 diabetes mellitus with diabetic nephropathy (HCC)   • Gout   • Hx-TIA (transient ischemic attack)   • Hypercholesteremia   • Hyperlipidemia associated with type 2 diabetes mellitus    • Hypertension   • Hypertension associated with diabetes    • Osteoarthritis   • Obesity (BMI 30-39. 9)   • Polyneuropathy   • Right renal artery stenosis (HCC)   • Vertebrobasilar artery syndrome   • Vitamin D deficiency   • Statin intolerance   • Lumbar disc herniation   • Spondylosis of lumbar region without myelopathy or radiculopathy   • Aortoiliac stenosis, left (HCC)   • Lumbosacral spondylosis without myelopathy   • Hyperlipidemia, unspecified   • Herniated lumbar intervertebral disc   • Atherosclerosis of renal artery (HCC)   • Celiac artery stenosis (HCC) >70% stenosis in the celiac trunk   • Abnormal CT of the chest suspicious for an infectious or inflammatory bronchiolitis.    • Positive cardiac stress test  small, mildly severe, partially reversible myocardial perfusion defect of anterior and inferior wall    • Femoral artery stenosis, right (HCC) 50-75% stenosis in the common femoral artery.    • Mesenteric artery stenosis (HCC)   • Median arcuate ligament syndrome (HCC)   • Atherosclerosis of native arteries of extremities with intermittent claudication, bilateral legs ([facility])   • Symptomatic carotid artery stenosis, left   • Pulmonary nodule 7 mm groundglass opacity in the right upper lobe, unchanged since October 2019   • Stenosis of left anterior descending artery Mid LAD 50-60% stenosis   • 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   • Malignant neoplasm of overlapping sites of bladder [facility])   • Cervical radiculopathy   • Stage 4 chronic kidney disease ([facility])   • Hypertensive kidney disease with stage 4 chronic kidney disease ([facility])   • Embolism and 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 ([facility])   • Depression, recurrent ([facility])   • Obesity, morbid ([address])   • Type 2 diabetes mellitus, without long-term current use of insulin ([address])   • Type 2 diabetes mellitus with diabetic peripheral angiopathy without gangrene ([address])   • Gastrointestinal stromal tumor (GIST) ([address])   • History of gastrointestinal stromal tumor (GIST)   • Secondary hyperparathyroidism of renal origin ([address])   • Aortoiliac occlusive disease ([address])   • Hypertensive urgency   • Dysarthria   • Stage 3b chronic kidney disease ([address])   • Aphasia as late effect of cerebrovascular accident (CVA)   • Acute cystitis   • Acute CVA (cerebrovascular accident) ([address])   • Primary hypertension   • Smoking   • Renal cyst       PAST MEDICAL HISTORY:  Past Medical History:   Diagnosis Date   • Arthritis    • Chronic kidney disease    • Diabetes mellitus ([address])    • Endometriosis    • High cholesterol    • Hypertension    • Kidney disease, chronic, stage III (moderate, EGFR 30-59 ml/min) ([facility])    • Lumbar disc herniation    • Neuropathy    • Peripheral vascular disease ([facility])    • Pneumonia • Shoulder injury     left   • Spinal stenosis    • Stroke ([facility]) 2015    Memory loss       PAST SURGICAL HISTORY:  Past Surgical History:   Procedure Laterality Date   • CARDIAC CATHETERIZATION     • CAROTID STENT Left 9/7/2023    Procedure: Garret Bah;  Surgeon: Marianne Cassidy MD;  Location: [facility];  Service: Vascular   • COLONOSCOPY     • FL RETROGRADE PYELOGRAM  4/6/2020   • FRACTURE SURGERY Right     ankle   • IR CAROTID STENT  9/7/2023   • OVARIAN CYST SURGERY     • DE CYSTO BLADDER W/URETERAL CATHETERIZATION Bilateral 4/6/2020    Procedure: CYSTOSCOPY WITH RETROGRADE PYELOGRAM;  Surgeon: Gerardo Knox MD;  Location[facility];  Service: Urology   • DE CYSTO W/INSERT URETERAL STENT Right 4/6/2020    Procedure: INSERTION STENT URETERAL;  Surgeon: Gerardo Knox MD;  [facility];  Service: Urology   • DE CYSTO W/REMOVAL OF TUMORS SMALL N/A 4/6/2020    Procedure: TRANSURETHRAL RESECTION OF BLADDER TUMOR (TURBT);   Surgeon: Gerardo Knox MD;  [facility];  Service: Urology   • ROTATOR CUFF REPAIR Left    • TONSILLECTOMY         FAMILY HISTORY:  Family History   Problem Relation Age of Onset   • Hypertension Mother    • Heart disease Mother         Valvular   • Hyperlipidemia Mother    • Hypertension Father    • Heart defect Father         Cardiomegaly   • Stroke Sister         Cerebrovascular Accident   • Arthritis Brother    • Other Brother         Back Disorder       SOCIAL HISTORY:  Social History     Socioeconomic History   • Marital status: /Civil Union     Spouse name: Not on file   • Number of children: Not on file   • Years of education: Not on file   • Highest education level: Not on file   Occupational History   • Occupation: retired   Tobacco Use   • Smoking status: Former     Packs/day: 2.00     Years: 40.00     Total pack years: 80.00     Types: Cigarettes     Start date: 1966     Quit date: 7/27/2020     Years since quitting: 3.1   • Smokeless tobacco: Never Vaping Use   • Vaping Use: Never used   Substance and Sexual Activity   • Alcohol use: Never   • Drug use: Never   • Sexual activity: Not Currently     Partners: Male   Other Topics Concern   • Not on file   Social History Narrative    Occasional Caffeine Consumption     Social Determinants of Health     Financial Resource Strain: Not on file   Food Insecurity: No Food Insecurity (9/5/2023)    Hunger Vital Sign    • Worried About Running Out of Food in the Last Year: Never true    • Ran Out of Food in the Last Year: Never true   Transportation Needs: No Transportation Needs (9/5/2023)    PRAPARE - Transportation    • Lack of Transportation (Medical): No    • Lack of Transportation (Non-Medical): No   Physical Activity: Not on file   Stress: Not on file   Social Connections: Not on file   Intimate Partner Violence: Not on file   Housing Stability: Low Risk  (9/5/2023)    Housing Stability Vital Sign    • Unable to Pay for Housing in the Last Year: No    • Number of Places Lived in the Last Year: 1    • Unstable Housing in the Last Year: No       Review of Systems   Constitutional: Negative for chills and fever. HENT: Negative for ear pain and sore throat. Eyes: Positive for pain and visual disturbance. Respiratory: Negative for cough and shortness of breath. Cardiovascular: Negative for chest pain and palpitations. Gastrointestinal: Negative for abdominal pain and vomiting. Genitourinary: Negative for dysuria and hematuria. Musculoskeletal: Positive for arthralgias, back pain and gait problem. Skin: Negative for color change and rash. Neurological: Negative for seizures and syncope. All other systems reviewed and are negative. Objective:  Vitals:    10/02/23 0943   BP: 138/68   BP Location: Left arm   Patient Position: Sitting   Cuff Size: Adult   Pulse: 69   SpO2: 98%   Weight: 68 kg (150 lb)   Height: 4' 10" (1.473 m)     Body mass index is 31.35 kg/m².      Physical Exam  Vitals and nursing note reviewed. Constitutional:       Appearance: Normal appearance. She is obese. HENT:      Head: Normocephalic and atraumatic. Eyes:      Comments: Right eye bruise  Left eye cataract   Cardiovascular:      Rate and Rhythm: Normal rate and regular rhythm. Heart sounds: Normal heart sounds. Pulmonary:      Effort: Pulmonary effort is normal.      Breath sounds: Normal breath sounds. Musculoskeletal:         General: Normal range of motion. Cervical back: Normal range of motion. Right lower leg: No edema. Left lower leg: No edema. Skin:     General: Skin is warm and dry. Neurological:      General: No focal deficit present. Mental Status: She is alert and oriented to person, place, and time. Mental status is at baseline. Psychiatric:         Mood and Affect: Mood normal.           RESULTS:  Hemoglobin A1C   Date/Time Value Ref Range Status   09/03/2023 01:14 PM 7.3 (H) Normal 4.0-5.6%; PreDiabetic 5.7-6.4%;  Diabetic >=6.5%; Glycemic control for adults with diabetes <7.0% % Final   09/08/2020 09:21 AM 7.3 (H) 4.8 - 5.6 % Final     Comment:              Prediabetes: 5.7 - 6.4           Diabetes: >6.4           Glycemic control for adults with diabetes: <7.0       Cholesterol   Date/Time Value Ref Range Status   09/03/2023 01:14  See Comment mg/dL Final     Comment:     Cholesterol:         Pediatric <18 Years        Desirable          <170 mg/dL      Borderline High    170-199 mg/dL      High               >=200 mg/dL        Adult >=18 Years            Desirable        <200 mg/dL      Borderline High  200-239 mg/dL      High             >239 mg/dL       Cholesterol, Total   Date/Time Value Ref Range Status   09/08/2020 09:21  (H) 100 - 199 mg/dL Final     Triglycerides   Date/Time Value Ref Range Status   09/03/2023 01:14  (H) See Comment mg/dL Final     Comment:     Triglyceride:     0-9Y            <75mg/dL     10Y-17Y         <90 mg/dL       >=18Y Normal          <150 mg/dL     Borderline High 150-199 mg/dL     High            200-499 mg/dL        Very High       >499 mg/dL    Specimen collection should occur prior to Metamizole administration due to the potential for falsely depressed results. 09/08/2020 09:21  (H) 0 - 149 mg/dL Final     HDL   Date/Time Value Ref Range Status   09/08/2020 09:21 AM 36 (L) >39 mg/dL Final     HDL, Direct   Date/Time Value Ref Range Status   09/03/2023 01:14 PM 52 >=50 mg/dL Final     LDL Calculated   Date/Time Value Ref Range Status   09/03/2023 01:14 PM 59 0 - 100 mg/dL Final     Comment:     LDL Cholesterol:     Optimal           <100 mg/dl     Near Optimal      100-129 mg/dl     Above Optimal       Borderline High 130-159 mg/dl       High            160-189 mg/dl       Very High       >189 mg/dl         This screening LDL is a calculated result. It does not have the accuracy of the Direct Measured LDL in the monitoring of patients with hyperlipidemia and/or statin therapy. Direct Measure LDL ([id number]) must be ordered separately in these patients. 09/08/2020 09:21  (H) 0 - 99 mg/dL Final     Hemoglobin   Date/Time Value Ref Range Status   09/08/2023 05:00 AM 11.1 (L) 11.5 - 15.4 g/dL Final     Hematocrit   Date/Time Value Ref Range Status   09/08/2023 05:00 AM 32.3 (L) 34.8 - 46.1 % Final     Platelets   Date/Time Value Ref Range Status   09/08/2023 05:00  149 - 390 Thousands/uL Final     TSH 3RD GENERATON   Date/Time Value Ref Range Status   06/14/2023 06:52 AM 3.160 0.450 - 4.500 uIU/mL Final     Comment:     The recommended reference ranges for TSH during pregnancy are as follows:   First trimester 0.1 to 2.5 uIU/mL   Second trimester  0.2 to 3.0 uIU/mL   Third trimester 0.3 to 3.0 uIU/m    Note: Normal ranges may not apply to patients who are transgender, non-binary, or whose legal sex, sex at birth, and gender identity differ.   Adult TSH (3rd generation) reference range follows the recommended guidelines of the American Thyroid Association, January, 2020. Free T4   Date/Time Value Ref Range Status   04/12/2021 09:59 AM 1.07 0.76 - 1.46 ng/dL Final     Comment:     Specimen collection should occur prior to Sulfasalazine administration due to the potential for falsely elevated results. Sodium   Date/Time Value Ref Range Status   09/09/2023 10:09  135 - 147 mmol/L Final   10/20/2020 07:57  134 - 144 mmol/L Final     BUN   Date/Time Value Ref Range Status   09/09/2023 10:09 AM 43 (H) 5 - 25 mg/dL Final   10/20/2020 07:57 AM 45 (H) 8 - 27 mg/dL Final     Creatinine   Date/Time Value Ref Range Status   09/09/2023 10:09 AM 1.82 (H) 0.60 - 1.30 mg/dL Final     Comment:     Standardized to IDMS reference method      In chart    This note was created with voice recognition software. Phonic, grammatical and spelling errors may be present within the note as a result.

## 2023-10-06 ENCOUNTER — NEW PATIENT (OUTPATIENT)
Dept: URBAN - METROPOLITAN AREA CLINIC 6 | Facility: CLINIC | Age: 78
End: 2023-10-06

## 2023-10-06 DIAGNOSIS — H43.813: ICD-10-CM

## 2023-10-06 DIAGNOSIS — Z79.4: ICD-10-CM

## 2023-10-06 DIAGNOSIS — E11.3212: ICD-10-CM

## 2023-10-06 PROCEDURE — 92250 FUNDUS PHOTOGRAPHY W/I&R: CPT

## 2023-10-06 PROCEDURE — 92134 CPTRZ OPH DX IMG PST SGM RTA: CPT | Mod: NC

## 2023-10-06 PROCEDURE — 92014 COMPRE OPH EXAM EST PT 1/>: CPT

## 2023-10-06 PROCEDURE — 92202 OPSCPY EXTND ON/MAC DRAW: CPT | Mod: NC

## 2023-10-06 ASSESSMENT — VISUAL ACUITY
OD_CC: 20/25-1
OS_CC: 20/80+1

## 2023-10-06 ASSESSMENT — TONOMETRY
OD_IOP_MMHG: 13
OS_IOP_MMHG: 16

## 2023-10-09 ENCOUNTER — TELEPHONE (OUTPATIENT)
Dept: NEUROLOGY | Facility: CLINIC | Age: 78
End: 2023-10-09

## 2023-10-09 NOTE — TELEPHONE ENCOUNTER
Post CVA Discharge Follow Up  Hospitalization: 9/3/23-9/9/23    Called patient and spoke with her son, Agustin Frias. He reports she is doing well at home. He denies any new or worsening stroke-like signs/symptoms. She is mobilizing with a walker and is independent with ADLs. Agustin Frias helps arrange her appointments. Reviewed appointments - patient successfully followed up with PCP. Offered to schedule stroke HFU. Scheduled appointment. Provided date/time/location/provider. Patient receives PT/OT twice weekly with a nursing aide [facility]. Reviewed medications with her. Reports she is taking as prescribed with no medication side effects or signs of bleeding. During this call, we reviewed stroke type, symptoms, personal risk factors and management, medications, and resources. Reports they are monitoring her BP and BS at home. Reports her BP today was 140/80s. They will continue to monitor. He reports her BS is typically in the 120-140s daily. All of his questions were addressed. At the conclusion of the conversation, he denies having any further questions or concerns.

## 2023-10-10 ENCOUNTER — PRE-OP CATARACT MEASUREMENTS (OUTPATIENT)
Dept: URBAN - METROPOLITAN AREA CLINIC 6 | Facility: CLINIC | Age: 78
End: 2023-10-10

## 2023-10-10 DIAGNOSIS — H25.813: ICD-10-CM

## 2023-10-10 PROCEDURE — 92012 INTRM OPH EXAM EST PATIENT: CPT

## 2023-10-10 PROCEDURE — 92136 OPHTHALMIC BIOMETRY: CPT

## 2023-10-10 ASSESSMENT — KERATOMETRY
OS_AXISANGLE2_DEGREES: 103
OD_AXISANGLE2_DEGREES: 84
OS_K1POWER_DIOPTERS: 44.50
OS_K2POWER_DIOPTERS: 45.25
OD_AXISANGLE_DEGREES: 174
OD_K2POWER_DIOPTERS: 45.25
OS_AXISANGLE_DEGREES: 13
OD_K1POWER_DIOPTERS: 43.75

## 2023-10-10 ASSESSMENT — VISUAL ACUITY
OD_PH: 20/40
OD_GLARE: 20/80
OD_CC: 20/50
OS_CC: 20/150

## 2023-10-10 ASSESSMENT — TONOMETRY
OD_IOP_MMHG: 10
OS_IOP_MMHG: 13

## 2023-10-12 ENCOUNTER — TELEPHONE (OUTPATIENT)
Dept: INTERNAL MEDICINE CLINIC | Facility: CLINIC | Age: 78
End: 2023-10-12

## 2023-10-12 NOTE — TELEPHONE ENCOUNTER
Called and [facility] for Jose Antonio Cooper to check on her, told her to give office a call to let us know how she is doing.

## 2023-10-12 NOTE — TELEPHONE ENCOUNTER
Nirali Lopez called to report the fall patient had this morning at 11:30 am. She was getting up from her bed and had pain in her right knee and it caused her to fall. No injuries found. Any questions Nirali Lopez can be reached at [phone].

## 2023-10-12 NOTE — TELEPHONE ENCOUNTER
Hi, my name's Mariya Hunter. I work with [employer]. I'm calling regarding a patient of Doctor Elena Madrid. Her name is Paula Durham. Birthday is June 15th of 1945. I'm calling to report a fall this morning. She has no new injuries or any new onset of pain on her son assisted her from the floor. But I just wanted to make Doctor Mariano Lyon and know If you need to contact me my number is [phone].  Thanks, bye.

## 2023-10-15 NOTE — ASSESSMENT & PLAN NOTE
Patient with left carotid artery disease stroke  · CTA head and neck reveals extensive intracranial atherosclerotic disease stenosis extensive disease bilateral carotid arteries  · Evaluated by vascular surgery, the patient is at high risk for hemorrhagic conversion given brain MRI finding of petechial microhemorrhage  · Status post left TCAR without complication on 9/7  · Continue aspirin, Brilinta and statin  · Discharge to rehab today Right leg swelling

## 2023-10-16 ENCOUNTER — TELEPHONE (OUTPATIENT)
Dept: INTERNAL MEDICINE CLINIC | Facility: CLINIC | Age: 78
End: 2023-10-16

## 2023-10-16 ENCOUNTER — OFFICE VISIT (OUTPATIENT)
Dept: CARDIOLOGY CLINIC | Facility: CLINIC | Age: 78
End: 2023-10-16
Payer: COMMERCIAL

## 2023-10-16 VITALS
SYSTOLIC BLOOD PRESSURE: 128 MMHG | RESPIRATION RATE: 16 BRPM | HEIGHT: 58 IN | BODY MASS INDEX: 30.23 KG/M2 | DIASTOLIC BLOOD PRESSURE: 68 MMHG | HEART RATE: 77 BPM | OXYGEN SATURATION: 98 % | WEIGHT: 144 LBS

## 2023-10-16 DIAGNOSIS — E78.2 MIXED HYPERLIPIDEMIA: ICD-10-CM

## 2023-10-16 DIAGNOSIS — Z51.81 ENCOUNTER FOR MONITORING ANTIPLATELET THERAPY: ICD-10-CM

## 2023-10-16 DIAGNOSIS — I10 PRIMARY HYPERTENSION: Primary | ICD-10-CM

## 2023-10-16 DIAGNOSIS — Z79.02 ENCOUNTER FOR MONITORING ANTIPLATELET THERAPY: ICD-10-CM

## 2023-10-16 DIAGNOSIS — I25.10 CORONARY ARTERY DISEASE INVOLVING NATIVE CORONARY ARTERY OF NATIVE HEART WITHOUT ANGINA PECTORIS: ICD-10-CM

## 2023-10-16 DIAGNOSIS — I63.9 CVA (CEREBRAL VASCULAR ACCIDENT) (HCC): ICD-10-CM

## 2023-10-16 DIAGNOSIS — I65.22 SYMPTOMATIC CAROTID ARTERY STENOSIS, LEFT: ICD-10-CM

## 2023-10-16 PROCEDURE — 99214 OFFICE O/P EST MOD 30 MIN: CPT | Performed by: INTERNAL MEDICINE

## 2023-10-16 NOTE — PROGRESS NOTES
[facility]  [address] [place] [address]  Cardiology Follow Up    Ade Michelle  1945  [id number]      1. Primary hypertension        2. Symptomatic carotid artery stenosis, left  Ambulatory referral to Cardiology      3. Mixed hyperlipidemia        4. CVA (cerebral vascular accident) ([address])  ticagrelor (BRILINTA) 90 MG      5. Encounter for monitoring antiplatelet therapy            Chief Complaint   Patient presents with    Follow-up       Interval History: Patient presents for follow-up visit. Patient denies any history of chest pain shortness of breath. Patient denies any history of leg edema or orthopnea PND. No history of presyncope syncope. Patient states compliance with the present list of medications. Patient scheduled for cataract surgery later this month. Patient had left carotid surgery. Patient has been on Brilinta through vascular surgery and neurology. Cardiac catheterization in 2020 showed chronic total occlusion of proximal RCA with moderate LAD disease treated with medical therapy. Patient Active Problem List   Diagnosis    Basilar artery stenosis    CVA (cerebral vascular accident) ([address])    Chronic GERD    Acute renal failure superimposed on stage 3 chronic kidney disease ([address])    Claudication in peripheral vascular disease ([address])    Type 2 diabetes mellitus with diabetic nephropathy (HCC)    Gout    Hx-TIA (transient ischemic attack)    Hypercholesteremia    Hyperlipidemia associated with type 2 diabetes mellitus     Hypertension    Hypertension associated with diabetes     Osteoarthritis    Obesity (BMI 30-39. 9)    Polyneuropathy    Right renal artery stenosis (HCC)    Vertebrobasilar artery syndrome    Vitamin D deficiency    Statin intolerance    Lumbar disc herniation    Spondylosis of lumbar region without myelopathy or radiculopathy    Aortoiliac stenosis, left (HCC)    Lumbosacral spondylosis without myelopathy Hyperlipidemia, unspecified    Herniated lumbar intervertebral disc    Atherosclerosis of renal artery (HCC)    Celiac artery stenosis (HCC) >70% stenosis in the celiac trunk    Abnormal CT of the chest suspicious for an infectious or inflammatory bronchiolitis. Positive cardiac stress test  small, mildly severe, partially reversible myocardial perfusion defect of anterior and inferior wall     Femoral artery stenosis, right (HCC) 50-75% stenosis in the common femoral artery.     Mesenteric artery stenosis (HCC)    Median arcuate ligament syndrome (HCC)    Atherosclerosis of native arteries of extremities with intermittent claudication, bilateral legs (HCC)    Symptomatic carotid artery stenosis, left    Pulmonary nodule 7 mm groundglass opacity in the right upper lobe, unchanged since October 2019    Stenosis of left anterior descending artery Mid LAD 50-60% stenosis    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    Malignant neoplasm of overlapping sites of bladder (HCC)    Cervical radiculopathy    Stage 4 chronic kidney disease (HCC)    Hypertensive kidney disease with stage 4 chronic kidney disease (HCC)    Embolism and 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)    Depression, recurrent (HCC)    Obesity, morbid ([address])    Type 2 diabetes mellitus, without long-term current use of insulin ([address])    Type 2 diabetes mellitus with diabetic peripheral angiopathy without gangrene ([address])    Gastrointestinal stromal tumor (GIST) ([address])    History of gastrointestinal stromal tumor (GIST)    Secondary hyperparathyroidism of renal origin ([address])    Aortoiliac occlusive disease ([address])    Hypertensive urgency    Dysarthria    Stage 3b chronic kidney disease ([address])    Aphasia as late effect of cerebrovascular accident (CVA)    Acute cystitis    Acute CVA (cerebrovascular accident) ([address])    Primary hypertension    Smoking    Renal cyst     Past Medical History:   Diagnosis Date    Arthritis     Chronic kidney disease     Diabetes mellitus ([facility])     Endometriosis     High cholesterol     Hypertension     Kidney disease, chronic, stage III (moderate, EGFR 30-59 ml/min) ([facility])     Lumbar disc herniation     Neuropathy     Peripheral vascular disease (HCC)     Pneumonia     Shoulder injury     left    Spinal stenosis     Stroke ([address] [facility]) 2015    Memory loss     Social History     Socioeconomic History    Marital status: /Civil Union     Spouse name: Not on file    Number of children: Not on file    Years of education: Not on file    Highest education level: Not on file   Occupational History    Occupation: retired   Tobacco Use    Smoking status: Former     Packs/day: 2.00     Years: 40.00     Total pack years: 80.00     Types: Cigarettes     Start date: 1966     Quit date: 7/27/2020     Years since quitting: 3.2    Smokeless tobacco: Never   Vaping Use    Vaping Use: Never used   Substance and Sexual Activity    Alcohol use: Never    Drug use: Never    Sexual activity: Not Currently     Partners: Male   Other Topics Concern    Not on file   Social History Narrative    Occasional Caffeine Consumption     Social Determinants of Health     Financial Resource Strain: Not on file   Food Insecurity: No Food Insecurity (9/5/2023)    Hunger Vital Sign     Worried About Running Out of Food in the Last Year: Never true     Ran Out of Food in the Last Year: Never true   Transportation Needs: No Transportation Needs (9/5/2023)    PRAPARE - Transportation     Lack of Transportation (Medical): No     Lack of Transportation (Non-Medical):  No   Physical Activity: Not on file   Stress: Not on file   Social Connections: Not on file   Intimate Partner Violence: Not on file   Housing Stability: Low Risk  (9/5/2023)    Housing Stability Vital Sign     Unable to Pay for Housing in the Last Year: No     Number of Places Lived in the Last Year: 1     Unstable Housing in the Last Year: No      Family History Problem Relation Age of Onset    Hypertension Mother     Heart disease Mother         Valvular    Hyperlipidemia Mother     Hypertension Father     Heart defect Father         Cardiomegaly    Stroke Sister         Cerebrovascular Accident    Arthritis Brother     Other Brother         Back Disorder     Past Surgical History:   Procedure Laterality Date    CARDIAC CATHETERIZATION      CAROTID STENT Left 9/7/2023    Procedure: Marques Murrell;  Surgeon: Tanja Mcknight MD;  [facility]: [facility];  Service: Vascular    COLONOSCOPY      FL RETROGRADE PYELOGRAM  4/6/2020    FRACTURE SURGERY Right     ankle    IR CAROTID STENT  9/7/2023    OVARIAN CYST SURGERY      WI CYSTO BLADDER W/URETERAL CATHETERIZATION Bilateral 4/6/2020    Procedure: CYSTOSCOPY WITH RETROGRADE PYELOGRAM;  Surgeon: Nahum Arizmendi MD;  Location[facility];  Service: Urology    WI CYSTO W/INSERT URETERAL STENT Right 4/6/2020    Procedure: INSERTION STENT URETERAL;  Surgeon: Nahum Arizmendi MD;  [facility];  Service: Urology    WI CYSTO W/REMOVAL OF TUMORS SMALL N/A 4/6/2020    Procedure: TRANSURETHRAL RESECTION OF BLADDER TUMOR (TURBT);   Surgeon: Nahum Arizmendi MD;  [facility];  Service: Urology    ROTATOR CUFF REPAIR Left     TONSILLECTOMY         Current Outpatient Medications:     acetaminophen (TYLENOL) 500 mg tablet, Take 650 mg by mouth every 6 (six) hours as needed for mild pain, Disp: , Rfl:     allopurinol (ZYLOPRIM) 100 mg tablet, TAKE 1 TABLET BY MOUTH EVERY DAY, Disp: 90 tablet, Rfl: 2    amLODIPine (NORVASC) 5 mg tablet, , Disp: , Rfl:     aspirin 81 mg chewable tablet, Chew 81 mg daily, Disp: , Rfl:     cinacalcet (SENSIPAR) 30 mg tablet, Take 1 tablet (30 mg total) by mouth every other day, Disp: 45 tablet, Rfl: 2    cloNIDine (CATAPRES-TTS-3) 0.3 mg/24 hr, PLACE 1 PATCH (0.3 MG TOTAL) ON THE SKIN ONCE A WEEK, Disp: 12 patch, Rfl: 1    escitalopram (LEXAPRO) 10 mg tablet, Take 1 tablet (10 mg total) by mouth daily Do not start before September 10, 2023., Disp: , Rfl: 0    Glucagon 1 MG/0.2ML SOAJ, Inject 1 mg under the skin if needed (low blood sugar), Disp: , Rfl:     insulin glargine (LANTUS) 100 units/mL subcutaneous injection, Inject 8 Units under the skin daily at bedtime, Disp: 10 mL, Rfl: 0    insulin lispro (HumaLOG) 100 units/mL injection, Inject under the skin, Disp: , Rfl:     Insulin Pen Needle (PEN NEEDLES 31GX5/16") 31G X 8 MM MISC, Use 2 (two) times a day, Disp: 30 each, Rfl: 1    labetalol (NORMODYNE) 300 mg tablet, Take 1 tablet (300 mg total) by mouth every 12 (twelve) hours, Disp: , Rfl: 0    Lantus SoloStar 100 units/mL SOPN, 9 UNITS SUBCUTANEOUS AT BEDTIME, Disp: , Rfl:     lidocaine (LIDODERM) 5 %, Apply 1 patch topically daily Remove & Discard patch within 12 hours or as directed by MD, Disp: , Rfl:     losartan (COZAAR) 100 MG tablet, Take 1 tablet (100 mg total) by mouth daily, Disp: 90 tablet, Rfl: 1    pravastatin (PRAVACHOL) 80 mg tablet, Take 1 tablet (80 mg total) by mouth daily with dinner, Disp: 30 tablet, Rfl: 0    ticagrelor (BRILINTA) 90 MG, Take 1 tablet (90 mg total) by mouth every 12 (twelve) hours, Disp: 60 tablet, Rfl: 5    traZODone (DESYREL) 50 mg tablet, Take 1 tablet (50 mg total) by mouth daily at bedtime, Disp: 30 tablet, Rfl: 0    Zinc Oxide, Topical, 16 & 40 % KIT, Apply topically 2 (two) times a day Pressure Ulcer, Disp: , Rfl:   Allergies   Allergen Reactions    Fenofibrate Other (See Comments)      blood in urine  hx  Kidney Failure    Colesevelam Other (See Comments)      leg pains    Colestipol Itching and Other (See Comments)      Swelling lower legs    Ezetimibe GI Intolerance    Statins Myalgia       Labs:  No results displayed because visit has over 200 results.       Admission on 08/21/2023, Discharged on 08/25/2023   Component Date Value    WBC 08/21/2023 8.43     RBC 08/21/2023 4.48     Hemoglobin 08/21/2023 13.9     Hematocrit 08/21/2023 39.6     MCV 08/21/2023 88 MCH 08/21/2023 31.0     MCHC 08/21/2023 35.1     RDW 08/21/2023 14.6     MPV 08/21/2023 10.2     Platelets 41/94/5294 275     nRBC 08/21/2023 0     Neutrophils Relative 08/21/2023 64     Immat GRANS % 08/21/2023 1     Lymphocytes Relative 08/21/2023 21     Monocytes Relative 08/21/2023 9     Eosinophils Relative 08/21/2023 4     Basophils Relative 08/21/2023 1     Neutrophils Absolute 08/21/2023 5.40     Immature Grans Absolute 08/21/2023 0.09     Lymphocytes Absolute 08/21/2023 1.76     Monocytes Absolute 08/21/2023 0.79     Eosinophils Absolute 08/21/2023 0.32     Basophils Absolute 08/21/2023 0.07     Sodium 08/21/2023 136     Potassium 08/21/2023 4.3     Chloride 08/21/2023 104     CO2 08/21/2023 22     ANION GAP 08/21/2023 10     BUN 08/21/2023 28 (H)     Creatinine 08/21/2023 1.91 (H)     Glucose 08/21/2023 158 (H)     Calcium 08/21/2023 10.1     AST 08/21/2023 24     ALT 08/21/2023 27     Alkaline Phosphatase 08/21/2023 100     Total Protein 08/21/2023 7.8     Albumin 08/21/2023 4.5     Total Bilirubin 08/21/2023 0.72     eGFR 08/21/2023 25     Color, UA 08/22/2023 Light Yellow     Clarity, UA 08/22/2023 Turbid     Specific Gravity, UA 08/22/2023 1.012     pH, UA 08/22/2023 5.5     Leukocytes, UA 08/22/2023 Large (A)     Nitrite, UA 08/22/2023 Negative     Protein, UA 08/22/2023 70 (1+) (A)     Glucose, UA 08/22/2023 Negative     Ketones, UA 08/22/2023 Negative     Urobilinogen, UA 08/22/2023 <2.0     Bilirubin, UA 08/22/2023 Negative     Occult Blood, UA 08/22/2023 Negative     Sodium 08/22/2023 138     Potassium 08/22/2023 4.2     Chloride 08/22/2023 106     CO2 08/22/2023 21     ANION GAP 08/22/2023 11     BUN 08/22/2023 26 (H)     Creatinine 08/22/2023 1.79 (H)     Glucose 08/22/2023 166 (H)     Glucose, Fasting 08/22/2023 166 (H)     Calcium 08/22/2023 9.4     eGFR 08/22/2023 27     WBC 08/22/2023 9.64     RBC 08/22/2023 4.29     Hemoglobin 08/22/2023 13.2     Hematocrit 08/22/2023 38.4     MCV 08/22/2023 90     MCH 08/22/2023 30.8     MCHC 08/22/2023 34.4     RDW 08/22/2023 14.6     MPV 08/22/2023 10.3     Platelets 69/66/2593 248     nRBC 08/22/2023 0     Neutrophils Relative 08/22/2023 69     Immat GRANS % 08/22/2023 1     Lymphocytes Relative 08/22/2023 16     Monocytes Relative 08/22/2023 9     Eosinophils Relative 08/22/2023 4     Basophils Relative 08/22/2023 1     Neutrophils Absolute 08/22/2023 6.66     Immature Grans Absolute 08/22/2023 0.12     Lymphocytes Absolute 08/22/2023 1.58     Monocytes Absolute 08/22/2023 0.83     Eosinophils Absolute 08/22/2023 0.39     Basophils Absolute 08/22/2023 0.06     RBC, UA 08/22/2023 2-4 (A)     WBC, UA 08/22/2023 20-30 (A)     Epithelial Cells 08/22/2023 Occasional     Bacteria, UA 08/22/2023 Occasional     Urine Culture 08/22/2023 >100,000 cfu/ml Escherichia coli (A)     POC Glucose 08/22/2023 153 (H)     POC Glucose 08/22/2023 138     POC Glucose 08/22/2023 145 (H)     POC Glucose 08/22/2023 95     POC Glucose 08/22/2023 165 (H)     WBC 08/23/2023 7.16     RBC 08/23/2023 4.14     Hemoglobin 08/23/2023 12.7     Hematocrit 08/23/2023 36.6     MCV 08/23/2023 88     MCH 08/23/2023 30.7     MCHC 08/23/2023 34.7     RDW 08/23/2023 14.4     MPV 08/23/2023 9.9     Platelets 29/85/6160 231     nRBC 08/23/2023 0     Neutrophils Relative 08/23/2023 55     Immat GRANS % 08/23/2023 1     Lymphocytes Relative 08/23/2023 26     Monocytes Relative 08/23/2023 12     Eosinophils Relative 08/23/2023 5     Basophils Relative 08/23/2023 1     Neutrophils Absolute 08/23/2023 3.97     Immature Grans Absolute 08/23/2023 0.05     Lymphocytes Absolute 08/23/2023 1.88     Monocytes Absolute 08/23/2023 0.85     Eosinophils Absolute 08/23/2023 0.36     Basophils Absolute 08/23/2023 0.05     Sodium 08/23/2023 138     Potassium 08/23/2023 3.8     Chloride 08/23/2023 107     CO2 08/23/2023 22     ANION GAP 08/23/2023 9     BUN 08/23/2023 25     Creatinine 08/23/2023 1.65 (H)     Glucose 08/23/2023 117     Calcium 08/23/2023 9.2     eGFR 08/23/2023 29     POC Glucose 08/23/2023 209 (H)     POC Glucose 08/23/2023 216 (H)     POC Glucose 08/23/2023 153 (H)     POC Glucose 08/23/2023 110     POC Glucose 08/24/2023 169 (H)     Ventricular Rate 08/21/2023 73     Atrial Rate 08/21/2023 73     MT Interval 08/21/2023 208     QRSD Interval 08/21/2023 90     QT Interval 08/21/2023 400     QTC Interval 08/21/2023 440     P Axis 08/21/2023 85     QRS Axis 08/21/2023 1     T Wave [place] 08/21/2023 124     Ventricular Rate 08/21/2023 80     Atrial Rate 08/21/2023 80     MT Interval 08/21/2023 202     QRSD Interval 08/21/2023 92     QT Interval 08/21/2023 394     QTC Interval 08/21/2023 454     P Axis 08/21/2023 37     QRS [place] 08/21/2023 -19     T Wave [place] 08/21/2023 118     POC Glucose 08/24/2023 168 (H)     POC Glucose 08/24/2023 176 (H)     POC Glucose 08/24/2023 177 (H)     POC Glucose 08/25/2023 157 (H)     POC Glucose 08/25/2023 180 (H)    No results displayed because visit has over 200 results. Imaging: No results found.     Review of Systems:  Review of Systems  REVIEW OF SYSTEMS:  Constitutional:  Denies fever or chills   Eyes: For cataract surgery  HENT:  Denies nasal congestion or sore throat   Respiratory:  Denies cough or shortness of breath   Cardiovascular:  Denies chest pain or edema   GI:  Denies abdominal pain, nausea, vomiting, bloody stools or diarrhea   :  Denies dysuria, frequency, difficulty in micturition and nocturia  Musculoskeletal:  Denies back pain or joint pain   Neurologic: History of stroke  Endocrine:  Denies polyuria or polydipsia   Lymphatic:  Denies swollen glands   Psychiatric:  Denies depression or anxiety    Physical Exam:    /68 (BP Location: Left arm, Patient Position: Sitting, Cuff Size: Standard)   Pulse 77   Resp 16   Ht 4' 10" (1.473 m)   Wt 65.3 kg (144 lb)   LMP  (LMP Unknown)   SpO2 98%   BMI 30.10 kg/m²     Physical Exam  PHYSICAL EXAM:  General:  Patient is not in acute distress   Head: Normocephalic, Atraumatic. HEENT:  normocephalic, nonicteric  Neck:  JVP not raised. Trachea central. No carotid bruit  Respiratory:  normal breath sounds no crackles. no rhonchi  Cardiovascular:  Regular rate and rhythm no S3 no murmurs  GI:  Abdomen soft nontender. No organomegaly. Lymphatic:  No cervical or inguinal lymphadenopathy  Neurologic:  Patient is awake alert, oriented . Extremities no edema    Discussion/Summary:    Patient with multiple medical problems who seems to be doing reasonably well from cardiac standpoint. Previous studies reviewed with patient. Medications reviewed and possible side effects discussed. concepts of cardiovascular disease , signs and symptoms of heart disease. Dietary and risk factor modification reinforced. All questions answered. Safety measures reviewed. Patient advised to report any problems prompting medical attention. Patient has no specific contraindication from cardiac standpoint to proceed with the planned cataract surgery. Patient will be considered low to moderate cardiac risk. This has been discussed with patient and family. Patient and family has been counseled to discuss with vascular surgery and neurology regarding Brilinta as antiplatelet therapy as it looks like she is unable to afford long-term. Patient to report any bleeding issues. Follow-up in 6 months or earlier as needed. Patient is agreeable with the plan of care.

## 2023-10-16 NOTE — TELEPHONE ENCOUNTER
Patient is having surgery cataract surgery Oct 30. It is being done by [facility], Dr.Anthony Smith. She was here on 10/02 for a visit. Her son dropped of clearance papers for her to be filled out. Please advise. .    Call Alan Parikh when complete and he will pick them up. They are in a yellow envelope. Call back #[phone].     Placed in MA's bin

## 2023-10-17 PROBLEM — Z71.2 ENCOUNTER TO DISCUSS TEST RESULTS: Status: ACTIVE | Noted: 2023-10-17

## 2023-10-17 NOTE — PROGRESS NOTES
Problem List Items Addressed This Visit          Genitourinary    Malignant neoplasm of overlapping sites of bladder ([address])    Complex renal cyst    Relevant Orders    US kidney and bladder with pvr       Other    Encounter to discuss test results - Primary    Encounter for follow-up surveillance of urothelial carcinoma of lower urinary tract         Discussion:      History of low risk bladder cancer s/p TURBT, negative cystoscopy today for recurrence, follow up 1 year for cystoscopy. Her kidney function is too low for contrast enhanced imaging at this time. I will follow her renal cyst with renal u/s at this time. She is to undergo cataract surgery soon. She does have a history of 3 strokes and she previously smoked but she has not done so in some time. Portions of the above record have been created with voice recognition software. Occasional wrong word or "sound alike" substitution may have occurred due to the inherent limitations of voice recognition software. Read the chart carefully and recognize, using context, where substitution may have occurred. Assessment and plan:       Please see problem oriented charting for the assessment plan of today's urological complaints      Margareth Lubin MD      Chief Complaint     As listed above      History of Present Illness     Caroline Davis is a 70 y.o. woman with low risk bladder cancer here for follow up and surveillance cystoscopy    ECOG is 0    New complaints include some poor vision due to cataracts    She also has a complex renal cyst which we are following    The following portions of the patient's history were reviewed and updated as appropriate: allergies, current medications, past family history, past medical history, past social history, past surgical history and problem list.    Detailed Urologic History     - please refer to HPI    Review of Systems     Review of Systems   Constitutional: Negative. HENT: Negative.      Eyes:  Positive for visual disturbance. Respiratory: Negative. Cardiovascular: Negative. Gastrointestinal: Negative. Endocrine: Negative. Genitourinary: Negative. Musculoskeletal: Negative. Skin: Negative. Allergic/Immunologic: Negative. Neurological: Negative. Psychiatric/Behavioral: Negative. Allergies     Allergies   Allergen Reactions    Fenofibrate Other (See Comments)      blood in urine  hx  Kidney Failure    Colesevelam Other (See Comments)      leg pains    Colestipol Itching and Other (See Comments)      Swelling lower legs    Ezetimibe GI Intolerance    Statins Myalgia       Physical Exam     Physical Exam  Vitals reviewed. Exam conducted with a chaperone present. Constitutional:       General: She is not in acute distress. Appearance: Normal appearance. She is not ill-appearing, toxic-appearing or diaphoretic. HENT:      Head: Normocephalic and atraumatic. Comments: Some bruising about the right orbit, attributes this to eye testing recently  Genitourinary:     Comments: Atrophic vaginal mucosa present  Musculoskeletal:         General: No swelling. Skin:     Coloration: Skin is not jaundiced. Neurological:      General: No focal deficit present. Mental Status: She is alert and oriented to person, place, and time. Cranial Nerves: No cranial nerve deficit. Psychiatric:         Mood and Affect: Mood normal.         Behavior: Behavior normal.         Thought Content:  Thought content normal.         Judgment: Judgment normal.             Vital Signs  Vitals:    10/18/23 1104   BP: 122/80   Pulse: 71   SpO2: 99%   Height: 4' 10" (1.473 m)         Current Medications       Current Outpatient Medications:     acetaminophen (TYLENOL) 500 mg tablet, Take 650 mg by mouth every 6 (six) hours as needed for mild pain, Disp: , Rfl:     allopurinol (ZYLOPRIM) 100 mg tablet, TAKE 1 TABLET BY MOUTH EVERY DAY, Disp: 90 tablet, Rfl: 2    amLODIPine (NORVASC) 5 mg tablet, , Disp: , Rfl:     aspirin 81 mg chewable tablet, Chew 81 mg daily, Disp: , Rfl:     cinacalcet (SENSIPAR) 30 mg tablet, Take 1 tablet (30 mg total) by mouth every other day, Disp: 45 tablet, Rfl: 2    cloNIDine (CATAPRES-TTS-3) 0.3 mg/24 hr, PLACE 1 PATCH (0.3 MG TOTAL) ON THE SKIN ONCE A WEEK, Disp: 12 patch, Rfl: 1    escitalopram (LEXAPRO) 10 mg tablet, Take 1 tablet (10 mg total) by mouth daily Do not start before September 10, 2023., Disp: , Rfl: 0    Glucagon 1 MG/0.2ML SOAJ, Inject 1 mg under the skin if needed (low blood sugar), Disp: , Rfl:     insulin glargine (LANTUS) 100 units/mL subcutaneous injection, Inject 8 Units under the skin daily at bedtime, Disp: 10 mL, Rfl: 0    insulin lispro (HumaLOG) 100 units/mL injection, Inject under the skin, Disp: , Rfl:     Insulin Pen Needle (PEN NEEDLES 31GX5/16") 31G X 8 MM MISC, Use 2 (two) times a day, Disp: 30 each, Rfl: 1    labetalol (NORMODYNE) 300 mg tablet, Take 1 tablet (300 mg total) by mouth every 12 (twelve) hours, Disp: , Rfl: 0    Lantus SoloStar 100 units/mL SOPN, 9 UNITS SUBCUTANEOUS AT BEDTIME, Disp: , Rfl:     lidocaine (LIDODERM) 5 %, Apply 1 patch topically daily Remove & Discard patch within 12 hours or as directed by MD, Disp: , Rfl:     losartan (COZAAR) 100 MG tablet, Take 1 tablet (100 mg total) by mouth daily, Disp: 90 tablet, Rfl: 1    pravastatin (PRAVACHOL) 80 mg tablet, Take 1 tablet (80 mg total) by mouth daily with dinner, Disp: 30 tablet, Rfl: 0    ticagrelor (BRILINTA) 90 MG, Take 1 tablet (90 mg total) by mouth every 12 (twelve) hours, Disp: 60 tablet, Rfl: 5    traZODone (DESYREL) 50 mg tablet, Take 1 tablet (50 mg total) by mouth daily at bedtime, Disp: 30 tablet, Rfl: 0    Zinc Oxide, Topical, 16 & 40 % KIT, Apply topically 2 (two) times a day Pressure Ulcer, Disp: , Rfl:       Active Problems     Patient Active Problem List   Diagnosis    Basilar artery stenosis    CVA (cerebral vascular accident) ([address] )    Chronic GERD Acute renal failure superimposed on stage 3 chronic kidney disease (HCC)    Claudication in peripheral vascular disease (HCC)    Type 2 diabetes mellitus with diabetic nephropathy (HCC)    Gout    Hx-TIA (transient ischemic attack)    Hypercholesteremia    Hyperlipidemia associated with type 2 diabetes mellitus     Hypertension    Hypertension associated with diabetes     Osteoarthritis    Obesity (BMI 30-39. 9)    Polyneuropathy    Right renal artery stenosis (HCC)    Vertebrobasilar artery syndrome    Vitamin D deficiency    Statin intolerance    Lumbar disc herniation    Spondylosis of lumbar region without myelopathy or radiculopathy    Aortoiliac stenosis, left (HCC)    Lumbosacral spondylosis without myelopathy    Hyperlipidemia, unspecified    Herniated lumbar intervertebral disc    Atherosclerosis of renal artery (HCC)    Celiac artery stenosis (HCC) >70% stenosis in the celiac trunk    Abnormal CT of the chest suspicious for an infectious or inflammatory bronchiolitis. Positive cardiac stress test  small, mildly severe, partially reversible myocardial perfusion defect of anterior and inferior wall     Femoral artery stenosis, right (HCC) 50-75% stenosis in the common femoral artery.     Mesenteric artery stenosis (HCC)    Median arcuate ligament syndrome (HCC)    Atherosclerosis of native arteries of extremities with intermittent claudication, bilateral legs (HCC)    Symptomatic carotid artery stenosis, left    Pulmonary nodule 7 mm groundglass opacity in the right upper lobe, unchanged since October 2019    Stenosis of left anterior descending artery Mid LAD 50-60% stenosis    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    Malignant neoplasm of overlapping sites of bladder (HCC)    Cervical radiculopathy    Stage 4 chronic kidney disease (HCC)    Hypertensive kidney disease with stage 4 chronic kidney disease (HCC)    Embolism and 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)    Depression, recurrent ([address])    Obesity, morbid ([address])    Type 2 diabetes mellitus, without long-term current use of insulin ([address])    Type 2 diabetes mellitus with diabetic peripheral angiopathy without gangrene ([address])    Gastrointestinal stromal tumor (GIST) ([address])    History of gastrointestinal stromal tumor (GIST)    Secondary hyperparathyroidism of renal origin ([address])    Aortoiliac occlusive disease ([address])    Hypertensive urgency    Dysarthria    Stage 3b chronic kidney disease ([address])    Aphasia as late effect of cerebrovascular accident (CVA)    Acute CVA (cerebrovascular accident) ([address])    Primary hypertension    Smoking    Renal cyst    Encounter to discuss test results    Complex renal cyst    Encounter for follow-up surveillance of urothelial carcinoma of lower urinary tract         Past Medical History     Past Medical History:   Diagnosis Date    Arthritis     Chronic kidney disease     Diabetes mellitus ([address])     Endometriosis     High cholesterol     Hypertension     Kidney disease, chronic, stage III (moderate, EGFR 30-59 ml/min) (HCC)     Lumbar disc herniation     Neuropathy     Peripheral vascular disease (HCC)     Pneumonia     Shoulder injury     left    Spinal stenosis     Stroke ([address]) 2015    Memory loss         Surgical History     Past Surgical History:   Procedure Laterality Date    CARDIAC CATHETERIZATION      CAROTID STENT Left 9/7/2023    Procedure: Jass Dove;  Surgeon: Frederick Forbes MD;  Location: [facility];  Service: Vascular    COLONOSCOPY      FL RETROGRADE PYELOGRAM  4/6/2020    FRACTURE SURGERY Right     ankle    IR CAROTID STENT  9/7/2023    OVARIAN CYST SURGERY      ND CYSTO BLADDER W/URETERAL CATHETERIZATION Bilateral 4/6/2020    Procedure: CYSTOSCOPY WITH RETROGRADE PYELOGRAM;  Surgeon: Nilsa Garcia MD;  Location: [facility];  Service: Urology    ND CYSTO W/INSERT URETERAL STENT Right 4/6/2020    Procedure: INSERTION STENT URETERAL;  Surgeon: Savanna Hatch MD Stephen;  Location: [facility];  Service: Urology    MA CYSTO W/REMOVAL OF TUMORS SMALL N/A 4/6/2020    Procedure: TRANSURETHRAL RESECTION OF BLADDER TUMOR (TURBT); Surgeon: Destiney Green MD;  Location: [facility];  Service: Urology    ROTATOR CUFF REPAIR Left     TONSILLECTOMY           Family History     Family History   Problem Relation Age of Onset    Hypertension Mother     Heart disease Mother         Valvular    Hyperlipidemia Mother     Hypertension Father     Heart defect Father         Cardiomegaly    Stroke Sister         Cerebrovascular Accident    Arthritis Brother     Other Brother         Back Disorder         Social History     Social History     Social History     Tobacco Use   Smoking Status Former    Packs/day: 2.00    Years: 40.00    Total pack years: 80.00    Types: Cigarettes    Start date: 1966    Quit date: 7/27/2020    Years since quitting: 3.2   Smokeless Tobacco Never         Pertinent Lab Values     Lab Results   Component Value Date    CREATININE 1.82 (H) 09/09/2023       Urine cytology:  10/2019 - atypical cells  11/2019 - negative for UC    Pathology:  4/2020 - non invasive low grade papillary urothelial carcinoma        Pertinent Imaging       The patient's images were reviewed by me personally and also in real time with them in the examination room using our PACS imaging system. The imaging findings are significant for no hydronephrosis, .

## 2023-10-18 ENCOUNTER — PROCEDURE VISIT (OUTPATIENT)
Dept: UROLOGY | Facility: CLINIC | Age: 78
End: 2023-10-18
Payer: COMMERCIAL

## 2023-10-18 VITALS
SYSTOLIC BLOOD PRESSURE: 122 MMHG | OXYGEN SATURATION: 99 % | BODY MASS INDEX: 30.1 KG/M2 | HEART RATE: 71 BPM | DIASTOLIC BLOOD PRESSURE: 80 MMHG | HEIGHT: 58 IN

## 2023-10-18 DIAGNOSIS — Z08 ENCOUNTER FOR FOLLOW-UP SURVEILLANCE OF UROTHELIAL CARCINOMA OF LOWER URINARY TRACT: Primary | ICD-10-CM

## 2023-10-18 DIAGNOSIS — N28.1 COMPLEX RENAL CYST: ICD-10-CM

## 2023-10-18 DIAGNOSIS — Z71.2 ENCOUNTER TO DISCUSS TEST RESULTS: ICD-10-CM

## 2023-10-18 DIAGNOSIS — Z85.50 ENCOUNTER FOR FOLLOW-UP SURVEILLANCE OF UROTHELIAL CARCINOMA OF LOWER URINARY TRACT: Primary | ICD-10-CM

## 2023-10-18 DIAGNOSIS — C67.8 MALIGNANT NEOPLASM OF OVERLAPPING SITES OF BLADDER (HCC): ICD-10-CM

## 2023-10-18 PROCEDURE — 52000 CYSTOURETHROSCOPY: CPT | Performed by: UROLOGY

## 2023-10-18 PROCEDURE — 99214 OFFICE O/P EST MOD 30 MIN: CPT | Performed by: UROLOGY

## 2023-10-18 NOTE — PROGRESS NOTES
Office Cystoscopy Procedure Note    Indication:    Urothelial carcinoma of the bladder    Informed consent   The risks, benefits, complications, treatment options, and expected outcomes were discussed with the patient. The patient concurred with the proposed plan and provided informed consent. Anesthesia  Lidocaine jelly 2%    Antibiotic prophylaxis   None    Procedure  In the presence of a female nurse, the patient was placed in the supine frog-legged position, was prepped and draped in the usual manner using sterile technique, and 2% lidocaine jelly instilled into the urethra. Prior to this pelvic examination showed atrophic labia majora and minora, a small vaginal introitus, poor quality vaginal mucosa, and showed no pelvic floor descensus and no urethral hypermobility. Upon stress maneuvers there was no stress incontinence. A 17 F flexible cystoscope was then inserted into the urethra and the urethra and bladder carefully examined. The following findings were noted:    Findings:  Urethra:  Normal  Bladder:  Normal, no CIS, no recurrence  Ureteral orifices:  Normal  Other findings:  None, retroflexed view confirms    Specimens: None                 Complications:    None; patient tolerated the procedure well           Disposition: To home            Condition: Stable    Plan: Normal surveillance cystoscopy, no recurrence, RTC 1 year for cystoscopy for history of low risk bladder cancer       Cystoscopy     Date/Time  10/18/2023 11:00 AM     Performed by  Destiney Green MD   Authorized by  Destiney Green MD     Universal Protocol:  Consent: Verbal consent obtained. Written consent obtained.   Risks and benefits: risks, benefits and alternatives were discussed  Consent given by: patient  Patient understanding: patient states understanding of the procedure being performed  Patient consent: the patient's understanding of the procedure matches consent given  Procedure consent: procedure consent matches procedure scheduled  Relevant documents: relevant documents present and verified  Test results: test results available and properly labeled  Site marked: the operative site was not marked  Radiology Images displayed and confirmed. If images not available, report reviewed: imaging studies available  Required items: required blood products, implants, devices, and special equipment available  Patient identity confirmed: verbally with patient and provided demographic data      Procedure Details:  Procedure type: cystoscopy    Patient tolerance: Patient tolerated the procedure well with no immediate complications    Additional Procedure Details: Office Cystoscopy Procedure Note    Indication:    Urothelial carcinoma of the bladder    Informed consent   The risks, benefits, complications, treatment options, and expected outcomes were discussed with the patient. The patient concurred with the proposed plan and provided informed consent. Anesthesia  Lidocaine jelly 2%    Antibiotic prophylaxis   None    Procedure  In the presence of a female nurse, the patient was placed in the supine frog-legged position, was prepped and draped in the usual manner using sterile technique, and 2% lidocaine jelly instilled into the urethra. Prior to this pelvic examination showed atrophic labia majora and minora, a small vaginal introitus, poor quality vaginal mucosa, and showed no pelvic floor descensus and no urethral hypermobility. Upon stress maneuvers there was no stress incontinence. A 17 F flexible cystoscope was then inserted into the urethra and the urethra and bladder carefully examined.   The following findings were noted:    Findings:  Urethra:  Normal  Bladder:  Normal, no CIS, no recurrence  Ureteral orifices:  Normal  Other findings:  None, retroflexed view confirms    Specimens: None                 Complications:    None; patient tolerated the procedure well           Disposition: To home            Condition: Stable    Plan: Normal surveillance cystoscopy, no recurrence, RTC 1 year for cystoscopy for history of low risk bladder cancer

## 2023-10-19 DIAGNOSIS — E79.0 HYPERURICEMIA: ICD-10-CM

## 2023-10-19 RX ORDER — ALLOPURINOL 100 MG/1
TABLET ORAL
Qty: 90 TABLET | Refills: 2 | Status: SHIPPED | OUTPATIENT
Start: 2023-10-19

## 2023-10-20 ENCOUNTER — TELEPHONE (OUTPATIENT)
Dept: INTERNAL MEDICINE CLINIC | Facility: CLINIC | Age: 78
End: 2023-10-20

## 2023-10-20 NOTE — TELEPHONE ENCOUNTER
Spoke to [place] per Dr. Bill Leslie "  Only use zinc oxide on the wound  Can use sunburn around the wound" She will call son and let him know

## 2023-10-20 NOTE — TELEPHONE ENCOUNTER
Patient has a stage one pressure ulcer at the crease of her buttock right in the middle. Patient was advised to Zinc Oxide on pressure wound after wash with soap and water. Patient is doing this every day. The skin is in attack. Patient said that is really painful for her. Can any medicated formula be sent in for the patient?     Please advise [place] (0) independent

## 2023-10-23 ENCOUNTER — TELEPHONE (OUTPATIENT)
Dept: NEUROLOGY | Facility: CLINIC | Age: 78
End: 2023-10-23

## 2023-10-23 DIAGNOSIS — I63.9 ACUTE CVA (CEREBROVASCULAR ACCIDENT) (HCC): ICD-10-CM

## 2023-10-23 DIAGNOSIS — I10 PRIMARY HYPERTENSION: Primary | ICD-10-CM

## 2023-10-23 RX ORDER — AMLODIPINE BESYLATE 5 MG/1
5 TABLET ORAL DAILY
Qty: 90 TABLET | Refills: 0 | Status: SHIPPED | OUTPATIENT
Start: 2023-10-23 | End: 2024-01-21

## 2023-10-23 RX ORDER — ESCITALOPRAM OXALATE 10 MG/1
10 TABLET ORAL DAILY
Qty: 90 TABLET | Refills: 0 | Status: SHIPPED | OUTPATIENT
Start: 2023-10-23 | End: 2024-01-21

## 2023-10-23 NOTE — TELEPHONE ENCOUNTER
Left voice message for patient to call and confirm her appt for 10/25 at 4 pm with Dr. Theron Fuentes.

## 2023-10-25 ENCOUNTER — OFFICE VISIT (OUTPATIENT)
Dept: NEUROLOGY | Facility: CLINIC | Age: 78
End: 2023-10-25
Payer: COMMERCIAL

## 2023-10-25 VITALS
HEART RATE: 72 BPM | DIASTOLIC BLOOD PRESSURE: 70 MMHG | WEIGHT: 148 LBS | SYSTOLIC BLOOD PRESSURE: 110 MMHG | HEIGHT: 58 IN | BODY MASS INDEX: 31.07 KG/M2

## 2023-10-25 DIAGNOSIS — I10 PRIMARY HYPERTENSION: Primary | ICD-10-CM

## 2023-10-25 DIAGNOSIS — I63.9 CVA (CEREBRAL VASCULAR ACCIDENT) (HCC): ICD-10-CM

## 2023-10-25 DIAGNOSIS — I65.22 SYMPTOMATIC CAROTID ARTERY STENOSIS, LEFT: ICD-10-CM

## 2023-10-25 DIAGNOSIS — Z98.890 HISTORY OF LEFT COMMON CAROTID ARTERY STENT PLACEMENT: ICD-10-CM

## 2023-10-25 DIAGNOSIS — Z95.828 HISTORY OF LEFT COMMON CAROTID ARTERY STENT PLACEMENT: ICD-10-CM

## 2023-10-25 DIAGNOSIS — I65.21 CAROTID STENOSIS, ASYMPTOMATIC, RIGHT: ICD-10-CM

## 2023-10-25 PROCEDURE — 99215 OFFICE O/P EST HI 40 MIN: CPT | Performed by: PSYCHIATRY & NEUROLOGY

## 2023-10-25 NOTE — PROGRESS NOTES
Patient ID: Ade Michelle is a 70 y.o. female. Assessment/Plan: This is a 69 y/o Female with hx of CKD, DM, hyperlipidemia, basilar artery stenosis, left MCA CVA who is here as a hospital follow up. She has multiple left MCA and left PCA watershed infarcts, which is likely 2/2 left ICA stenosis. PLAN:        Diagnoses and all orders for this visit:    Primary hypertension  -BP goal <130/80. Symptomatic carotid artery stenosis, left  -s/p left ICA stent    CVA (cerebral vascular accident) ([address])  -for stroke prevention continue with combination of aspirin, Brilinta and pravastatin  -BP goal < 130/80, BP is at goal in the office. -LDL goal <70  -I advised patient to avoid using NSAIDs for headaches or other pain and to stick to tylenol if needed  -Recommend mediterranean diet & regular exercise regimen atleast 4-5 times a week for 20-30 minutes. -I educated patient/family regarding medication compliance    Carotid stenosis, asymptomatic, right  -follows vascular surgery, plan for getting this     History of left common carotid artery stent placement  -patient is on Aspirin, Brilinta and pravastatin  -follows vascular surgery       Follow up in 4 months. I would be happy to see the patient sooner if any new questions/concerns arise. Patient/Guardian was advised to the call the office if they have any questions and concerns in the meantime. Patient/Guardian does understand that if they have any new stroke like symptoms such as facial droop on one side, weakness/paralysis on either side, speech trouble, numbness on one side, balance issues, any vision changes, extreme dizziness or any new headache, to call 9-1-1 immediately or to proceed to the nearest ER immediately.       I have spent a total time of 40 minutes on 10/25/23 in caring for this patient including Risks and benefits of tx options, Instructions for management, Patient and family education, Importance of tx compliance, Counseling / Coordination of care, Documenting in the medical record, and Reviewing / ordering tests, medicine, procedures  . Subjective:    HPI    70-year-old female history of hypertension, hyperlipidemia, CKD, recent CVA of left occipital lobe, left MCA stroke from 8/15/2023 who is here as a hospital follow-up. He was in the hospital 7/28/2023 for strokelike symptoms. At that time MRI showed left PCA and left MCA strokes. Patient also had a CTA at that time which showed wrists 85 to 90% stenosis in the proximal left ICA and 80% stenosis of the proximal right ICA. Etiology of the strokes was thought to be secondary to the left ICA stenosis. Her surgery was consulted. Patient was started on aspirin and Brilinta. He was on aspirin and Plavix prior to 7/28/2023. It was then treated with a stent for the left ICA stenosis. She then re-presented 8/16/2023 with strokelike symptoms at this time. He had work-up which consisted of CT head CTA head and neck showed the left ICA stent, right ICA stenosis greater than 80%. And MRI brain show any evidence of any stroke. Is being followed by vascular surgery for the right carotid stenosis well. The following portions of the patient's history were reviewed and updated as appropriate: She  has a past medical history of Arthritis, Chronic kidney disease, Diabetes mellitus ([address]), Endometriosis, High cholesterol, Hypertension, Kidney disease, chronic, stage III (moderate, EGFR 30-59 ml/min) (HCC), Lumbar disc herniation, Neuropathy, Peripheral vascular disease ([address]), Pneumonia, Shoulder injury, Spinal stenosis, and Stroke ([address]) (2015).   She   Patient Active Problem List    Diagnosis Date Noted    Carotid stenosis, asymptomatic, right 10/27/2023    History of left common carotid artery stent placement 10/27/2023    Complex renal cyst 10/18/2023    Encounter for follow-up surveillance of urothelial carcinoma of lower urinary tract 10/18/2023    Encounter to discuss test results 10/17/2023    Smoking 09/07/2023    Renal cyst 09/07/2023    Primary hypertension 09/05/2023    Acute CVA (cerebrovascular accident) ([address]) 09/03/2023    Aphasia as late effect of cerebrovascular accident (CVA) 08/16/2023    Dysarthria 08/15/2023    Stage 3b chronic kidney disease ([address])     Hypertensive urgency 07/27/2023    Aortoiliac occlusive disease ([address]) 10/11/2022    History of gastrointestinal stromal tumor (GIST) 01/31/2022    Secondary hyperparathyroidism of renal origin ([address]) 01/31/2022    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    Type 2 diabetes mellitus, without long-term current use of insulin ([address]) 04/27/2021    Type 2 diabetes mellitus with diabetic peripheral angiopathy without gangrene ([address]) 04/27/2021    Embolism and 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 ([address]) 01/20/2021    Depression, recurrent ([address]) 01/20/2021    Obesity, morbid ([address]) 01/20/2021    Stage 4 chronic kidney disease ([address]) 11/03/2020    Hypertensive kidney disease with stage 4 chronic kidney disease ([address]) 11/03/2020    Cervical radiculopathy 05/12/2020    Malignant neoplasm of overlapping sites of bladder ([address]) 04/24/2020    Stenosis of left anterior descending artery Mid LAD 50-60% stenosis 04/01/2020    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 04/01/2020    Pulmonary nodule 7 mm groundglass opacity in the right upper lobe, unchanged since October 2019 03/19/2020    Atherosclerosis of native arteries of extremities with intermittent claudication, bilateral legs ([address]) 03/03/2020    Symptomatic carotid artery stenosis, left 03/03/2020    Femoral artery stenosis, right (HCC) 50-75% stenosis in the common femoral artery.  01/14/2020    Mesenteric artery stenosis (HCC) 01/14/2020    Positive cardiac stress test  small, mildly severe, partially reversible myocardial perfusion defect of anterior and inferior wall  11/12/2019    Abnormal CT of the chest suspicious for an infectious or inflammatory bronchiolitis. 11/07/2019    Celiac artery stenosis (HCC) >70% stenosis in the celiac trunk 11/05/2019    Median arcuate ligament syndrome ([address]) 11/05/2019    Atherosclerosis of renal artery ([address]) 07/01/2019    Aortoiliac stenosis, left ([address]) 02/25/2019    Spondylosis of lumbar region without myelopathy or radiculopathy 01/29/2019    Lumbosacral spondylosis without myelopathy 01/29/2019    Statin intolerance 01/22/2019    Lumbar disc herniation 01/22/2019    Herniated lumbar intervertebral disc 01/22/2019    Chronic GERD 12/04/2017    Acute renal failure superimposed on stage 3 chronic kidney disease ([address]) 12/04/2017    Claudication in peripheral vascular disease ([address]) 12/04/2017    Gout 12/04/2017    Hx-TIA (transient ischemic attack) 12/04/2017    Hyperlipidemia associated with type 2 diabetes mellitus  12/04/2017    Hypertension associated with diabetes  12/04/2017    Osteoarthritis 12/04/2017    Obesity (BMI 30-39.9) 12/04/2017    Right renal artery stenosis ([address]) 12/04/2017    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    Hyperlipidemia, unspecified 12/04/2017    Vitamin D deficiency 09/08/2016    Basilar artery stenosis 06/22/2016    Type 2 diabetes mellitus with diabetic nephropathy ([address]) 12/19/2015    Hypercholesteremia 12/19/2015    Hypertension 12/19/2015    Polyneuropathy 12/19/2015    CVA (cerebral vascular accident) ([address]) 11/09/2015     She  has a past surgical history that includes Rotator cuff repair (Left); Tonsillectomy; Ovarian cyst surgery; Fracture surgery (Right); Colonoscopy; Cardiac catheterization; [place] retrograde pyelogram (4/6/2020); pr cysto w/removal of tumors small (N/A, 4/6/2020); pr cysto bladder w/ureteral catheterization (Bilateral, 4/6/2020); pr cysto w/insert ureteral stent (Right, 4/6/2020); Carotid stent (Left, 9/7/2023); and IR carotid stent (9/7/2023).   Her family history includes Arthritis in her brother; Heart defect in her father; Heart disease in her mother; Hyperlipidemia in her mother; Hypertension in her father and mother; Other in her brother; Stroke in her sister. She  reports that she quit smoking about 3 years ago. Her smoking use included cigarettes. She started smoking about 57 years ago. She has a 80.00 pack-year smoking history. She has never used smokeless tobacco. She reports that she does not drink alcohol and does not use drugs.   Current Outpatient Medications   Medication Sig Dispense Refill    acetaminophen (TYLENOL) 500 mg tablet Take 650 mg by mouth every 6 (six) hours as needed for mild pain      allopurinol (ZYLOPRIM) 100 mg tablet TAKE 1 TABLET BY MOUTH EVERY DAY 90 tablet 2    amLODIPine (NORVASC) 5 mg tablet Take 1 tablet (5 mg total) by mouth daily 90 tablet 0    aspirin 81 mg chewable tablet Chew 81 mg daily      cinacalcet (SENSIPAR) 30 mg tablet Take 1 tablet (30 mg total) by mouth every other day 45 tablet 2    cloNIDine (CATAPRES-TTS-3) 0.3 mg/24 hr PLACE 1 PATCH (0.3 MG TOTAL) ON THE SKIN ONCE A WEEK 12 patch 1    escitalopram (LEXAPRO) 10 mg tablet Take 1 tablet (10 mg total) by mouth daily 90 tablet 0    Glucagon 1 MG/0.2ML SOAJ Inject 1 mg under the skin if needed (low blood sugar)      insulin glargine (LANTUS) 100 units/mL subcutaneous injection Inject 8 Units under the skin daily at bedtime 10 mL 0    insulin lispro (HumaLOG) 100 units/mL injection Inject under the skin      Insulin Pen Needle (PEN NEEDLES 31GX5/16") 31G X 8 MM MISC Use 2 (two) times a day 30 each 1    labetalol (NORMODYNE) 300 mg tablet Take 1 tablet (300 mg total) by mouth every 12 (twelve) hours  0    Lantus SoloStar 100 units/mL SOPN 9 UNITS SUBCUTANEOUS AT BEDTIME      lidocaine (LIDODERM) 5 % Apply 1 patch topically daily Remove & Discard patch within 12 hours or as directed by MD      losartan (COZAAR) 100 MG tablet Take 1 tablet (100 mg total) by mouth daily 90 tablet 1    pravastatin (PRAVACHOL) 80 mg tablet Take 1 tablet (80 mg total) by mouth daily with dinner 30 tablet 0    ticagrelor (BRILINTA) 90 MG Take 1 tablet (90 mg total) by mouth every 12 (twelve) hours 60 tablet 5    traZODone (DESYREL) 50 mg tablet Take 1 tablet (50 mg total) by mouth daily at bedtime 30 tablet 0    Zinc Oxide, Topical, 16 & 40 % KIT Apply topically 2 (two) times a day Pressure Ulcer       No current facility-administered medications for this visit.      Current Outpatient Medications on File Prior to Visit   Medication Sig    acetaminophen (TYLENOL) 500 mg tablet Take 650 mg by mouth every 6 (six) hours as needed for mild pain    allopurinol (ZYLOPRIM) 100 mg tablet TAKE 1 TABLET BY MOUTH EVERY DAY    amLODIPine (NORVASC) 5 mg tablet Take 1 tablet (5 mg total) by mouth daily    aspirin 81 mg chewable tablet Chew 81 mg daily    cinacalcet (SENSIPAR) 30 mg tablet Take 1 tablet (30 mg total) by mouth every other day    cloNIDine (CATAPRES-TTS-3) 0.3 mg/24 hr PLACE 1 PATCH (0.3 MG TOTAL) ON THE SKIN ONCE A WEEK    escitalopram (LEXAPRO) 10 mg tablet Take 1 tablet (10 mg total) by mouth daily    Glucagon 1 MG/0.2ML SOAJ Inject 1 mg under the skin if needed (low blood sugar)    insulin glargine (LANTUS) 100 units/mL subcutaneous injection Inject 8 Units under the skin daily at bedtime    insulin lispro (HumaLOG) 100 units/mL injection Inject under the skin    Insulin Pen Needle (PEN NEEDLES 31GX5/16") 31G X 8 MM MISC Use 2 (two) times a day    labetalol (NORMODYNE) 300 mg tablet Take 1 tablet (300 mg total) by mouth every 12 (twelve) hours    Lantus SoloStar 100 units/mL SOPN 9 UNITS SUBCUTANEOUS AT BEDTIME    lidocaine (LIDODERM) 5 % Apply 1 patch topically daily Remove & Discard patch within 12 hours or as directed by MD    losartan (COZAAR) 100 MG tablet Take 1 tablet (100 mg total) by mouth daily    pravastatin (PRAVACHOL) 80 mg tablet Take 1 tablet (80 mg total) by mouth daily with dinner    ticagrelor (BRILINTA) 90 MG Take 1 tablet (90 mg total) by mouth every 12 (twelve) hours    traZODone (DESYREL) 50 mg tablet Take 1 tablet (50 mg total) by mouth daily at bedtime    Zinc Oxide, Topical, 16 & 40 % KIT Apply topically 2 (two) times a day Pressure Ulcer     No current facility-administered medications on file prior to visit. She is allergic to fenofibrate, colesevelam, colestipol, ezetimibe, and statins. .         Objective:    Blood pressure 110/70, pulse 72, height 4' 10" (1.473 m), weight 67.1 kg (148 lb). Physical Exam    Neurological Exam      ROS:    Review of Systems   Constitutional:  Negative for appetite change, fatigue and fever. HENT: Negative. Negative for hearing loss, tinnitus, trouble swallowing and voice change. Eyes: Negative. Negative for photophobia, pain and visual disturbance (right side). Respiratory: Negative. Negative for shortness of breath. Cardiovascular: Negative. Negative for palpitations. Gastrointestinal: Negative. Negative for nausea and vomiting. Endocrine: Negative. Negative for cold intolerance. Genitourinary: Negative. Negative for dysuria, frequency and urgency. Musculoskeletal:  Positive for gait problem. Negative for back pain, myalgias and neck pain. Skin: Negative. Negative for rash. Allergic/Immunologic: Negative. Neurological:  Negative for dizziness, tremors, seizures, syncope, facial asymmetry, speech difficulty, weakness (Right side), light-headedness, numbness and headaches. Hematological: Negative. Does not bruise/bleed easily. Psychiatric/Behavioral: Negative. Negative for confusion, hallucinations and sleep disturbance.

## 2023-10-27 PROBLEM — I65.21 CAROTID STENOSIS, ASYMPTOMATIC, RIGHT: Status: ACTIVE | Noted: 2023-10-27

## 2023-10-27 PROBLEM — Z95.828 HISTORY OF LEFT COMMON CAROTID ARTERY STENT PLACEMENT: Status: ACTIVE | Noted: 2023-10-27

## 2023-10-27 PROBLEM — Z98.890 HISTORY OF LEFT COMMON CAROTID ARTERY STENT PLACEMENT: Status: ACTIVE | Noted: 2023-10-27

## 2023-10-30 ENCOUNTER — SURGERY/PROCEDURE (OUTPATIENT)
Dept: URBAN - METROPOLITAN AREA SURGICAL CENTER 6 | Facility: SURGICAL CENTER | Age: 78
End: 2023-10-30

## 2023-10-30 DIAGNOSIS — H25.812: ICD-10-CM

## 2023-10-30 PROCEDURE — 66984 XCAPSL CTRC RMVL W/O ECP: CPT

## 2023-10-31 ENCOUNTER — 1 DAY POST-OP (OUTPATIENT)
Dept: URBAN - METROPOLITAN AREA CLINIC 6 | Facility: CLINIC | Age: 78
End: 2023-10-31

## 2023-10-31 ENCOUNTER — TELEPHONE (OUTPATIENT)
Dept: INTERNAL MEDICINE CLINIC | Facility: CLINIC | Age: 78
End: 2023-10-31

## 2023-10-31 DIAGNOSIS — H25.811: ICD-10-CM

## 2023-10-31 DIAGNOSIS — Z96.1: ICD-10-CM

## 2023-10-31 PROCEDURE — 99024 POSTOP FOLLOW-UP VISIT: CPT

## 2023-10-31 ASSESSMENT — VISUAL ACUITY
OD_SC: 20/80
OS_PH: 20/60-1
OS_SC: 20/80-1
OS_OTHER: 1 DAY POST OP

## 2023-10-31 ASSESSMENT — TONOMETRY
OD_IOP_MMHG: 12
OS_IOP_MMHG: 22

## 2023-10-31 ASSESSMENT — KERATOMETRY
OD_K2POWER_DIOPTERS: 45.25
OS_AXISANGLE2_DEGREES: 103
OD_AXISANGLE2_DEGREES: 84
OD_AXISANGLE_DEGREES: 174
OD_AXISANGLE_DEGREES: 174
OS_K1POWER_DIOPTERS: 44.50
OS_K2POWER_DIOPTERS: 45.25
OD_K1POWER_DIOPTERS: 43.75
OS_K2POWER_DIOPTERS: 45.25
OD_K1POWER_DIOPTERS: 43.75
OS_AXISANGLE_DEGREES: 13
OS_K1POWER_DIOPTERS: 44.50
OS_AXISANGLE2_DEGREES: 103
OD_AXISANGLE2_DEGREES: 84
OD_K2POWER_DIOPTERS: 45.25
OS_AXISANGLE_DEGREES: 13

## 2023-10-31 NOTE — TELEPHONE ENCOUNTER
Phys therapist called from [facility]     Pt is requesting to hold phys therapy for 2 weeks due to cataract surg.

## 2023-11-01 ENCOUNTER — TELEPHONE (OUTPATIENT)
Dept: NEPHROLOGY | Facility: CLINIC | Age: 78
End: 2023-11-01

## 2023-11-01 NOTE — TELEPHONE ENCOUNTER
Called left  to remind patient to get labs done prior to 11/07/23 hospital fu with GRACIELA Laurent.

## 2023-11-02 ENCOUNTER — APPOINTMENT (OUTPATIENT)
Dept: LAB | Facility: HOSPITAL | Age: 78
End: 2023-11-02
Payer: COMMERCIAL

## 2023-11-02 DIAGNOSIS — N18.4 STAGE 4 CHRONIC KIDNEY DISEASE (HCC): ICD-10-CM

## 2023-11-02 LAB
ANION GAP SERPL CALCULATED.3IONS-SCNC: 11 MMOL/L
BUN SERPL-MCNC: 19 MG/DL (ref 5–25)
CALCIUM SERPL-MCNC: 9.5 MG/DL (ref 8.4–10.2)
CHLORIDE SERPL-SCNC: 104 MMOL/L (ref 96–108)
CO2 SERPL-SCNC: 24 MMOL/L (ref 21–32)
CREAT SERPL-MCNC: 1.75 MG/DL (ref 0.6–1.3)
GFR SERPL CREATININE-BSD FRML MDRD: 27 ML/MIN/1.73SQ M
GLUCOSE P FAST SERPL-MCNC: 125 MG/DL (ref 65–99)
POTASSIUM SERPL-SCNC: 3.7 MMOL/L (ref 3.5–5.3)
SODIUM SERPL-SCNC: 139 MMOL/L (ref 135–147)

## 2023-11-02 PROCEDURE — 36415 COLL VENOUS BLD VENIPUNCTURE: CPT

## 2023-11-02 PROCEDURE — 80048 BASIC METABOLIC PNL TOTAL CA: CPT

## 2023-11-06 ENCOUNTER — TELEPHONE (OUTPATIENT)
Dept: NEPHROLOGY | Facility: CLINIC | Age: 78
End: 2023-11-06

## 2023-11-06 ENCOUNTER — 1 WEEK POST-OP (OUTPATIENT)
Dept: URBAN - METROPOLITAN AREA CLINIC 6 | Facility: CLINIC | Age: 78
End: 2023-11-06

## 2023-11-06 DIAGNOSIS — Z96.1: ICD-10-CM

## 2023-11-06 DIAGNOSIS — H25.811: ICD-10-CM

## 2023-11-06 PROCEDURE — 99024 POSTOP FOLLOW-UP VISIT: CPT

## 2023-11-06 ASSESSMENT — KERATOMETRY
OS_K1POWER_DIOPTERS: 44.50
OD_K1POWER_DIOPTERS: 43.75
OD_AXISANGLE_DEGREES: 174
OS_K2POWER_DIOPTERS: 45.25
OS_AXISANGLE_DEGREES: 13
OD_K2POWER_DIOPTERS: 45.25
OS_AXISANGLE2_DEGREES: 103
OD_AXISANGLE2_DEGREES: 84

## 2023-11-06 ASSESSMENT — VISUAL ACUITY
OS_SC: 20/60
OD_CC: 20/50+1

## 2023-11-06 ASSESSMENT — TONOMETRY
OS_IOP_MMHG: 12
OD_IOP_MMHG: 13

## 2023-11-06 NOTE — TELEPHONE ENCOUNTER
I called and left message on patients answering machine confirming appt for tomorrow 11/07/2023 with Tevin

## 2023-11-06 NOTE — PROGRESS NOTES
NEPHROLOGY OFFICE NOTE    Patient: Uziel Crowder               Sex: female           YOB: 1945        Age:  70 y.o.       11/7/2023      BACKGROUND     I had the pleasure of seeing Мария Perdomo in the nephrology office today for hospital follow-up. She has a past medical history significant for GERD, mesenteric artery stenosis, type 2 diabetes with diabetic nephropathy, type 2 diabetes, hypertension, renal artery stenosis, prior CVA, CKD stage IV, complex renal cyst, vitamin D deficiency, and hyperlipidemia. He was hospitalized at [address] [place] [address] from 9/3 to 9/9 presenting with onset of right upper and lower extremity weakness while at skilled nursing facility and found to have acute CVA. She was followed by neurology and actively maintained on aspirin, Brilinta, and pravastatin. She has underlying symptomatic ICA stenosis and underwent left TCAR on 9/7/2023 during hospitalization, felt to be etiology of above stroke. Currently follows with Dr. Therese Cortez for CKD management and was last seen in the office on 8/7/2023. She has underlying CKD stage IIIb/IV, after review of the medical record it does appear that her baseline creatinine fluctuates around 1.5-1.8. She has a known cystic lesion in the right kidney that was present on renal ultrasound, advised follow-up with contrast-enhanced CT scan or MRI using renal mass protocol once renal function stable. He has underlying hypertension, actively maintained on amlodipine, clonidine patch, losartan, and labetalol. She underwent renal artery Doppler on 1/7/2020 and was found to have a greater than 60% narrowing of the right main renal artery with left side difficult to evaluate. Most recent labs were obtained on 11/2/2023, which we have reviewed together. SUBJECTIVE     She currently has no complaints at this time and is feeling well. Patient denies any chest pain, shortness of breath, or swelling.     REVIEW OF SYSTEMS Review of Systems   Constitutional:  Positive for activity change and fatigue. Negative for chills and fever. HENT:  Negative for trouble swallowing. Respiratory:  Negative for shortness of breath. Cardiovascular:  Negative for leg swelling. Gastrointestinal:  Negative for nausea and vomiting. Genitourinary:  Negative for difficulty urinating, dysuria, frequency and hematuria. Musculoskeletal:  Negative for back pain. Skin:  Negative for pallor. Neurological:  Positive for weakness. Negative for dizziness, syncope and light-headedness. Psychiatric/Behavioral:  Negative for sleep disturbance. The patient is not nervous/anxious. OBJECTIVE     Current Weight: Weight - Scale: 64.9 kg (143 lb)  Vitals:    11/07/23 1514   BP: 164/98   Pulse: 61   Resp: 16   Temp: (!) 97.1 °F (36.2 °C)   SpO2: 97%     Body mass index is 27.93 kg/m².     CURRENT MEDICATIONS       Current Outpatient Medications:     acetaminophen (TYLENOL) 500 mg tablet, Take 650 mg by mouth every 6 (six) hours as needed for mild pain, Disp: , Rfl:     allopurinol (ZYLOPRIM) 100 mg tablet, TAKE 1 TABLET BY MOUTH EVERY DAY, Disp: 90 tablet, Rfl: 2    amLODIPine (NORVASC) 5 mg tablet, Take 1 tablet (5 mg total) by mouth daily, Disp: 90 tablet, Rfl: 0    aspirin 81 mg chewable tablet, Chew 81 mg daily, Disp: , Rfl:     cinacalcet (SENSIPAR) 30 mg tablet, Take 1 tablet (30 mg total) by mouth every other day, Disp: 45 tablet, Rfl: 2    cloNIDine (CATAPRES-TTS-3) 0.3 mg/24 hr, PLACE 1 PATCH (0.3 MG TOTAL) ON THE SKIN ONCE A WEEK, Disp: 12 patch, Rfl: 1    escitalopram (LEXAPRO) 10 mg tablet, Take 1 tablet (10 mg total) by mouth daily, Disp: 90 tablet, Rfl: 0    Glucagon 1 MG/0.2ML SOAJ, Inject 1 mg under the skin if needed (low blood sugar), Disp: , Rfl:     insulin glargine (LANTUS) 100 units/mL subcutaneous injection, Inject 8 Units under the skin daily at bedtime, Disp: 10 mL, Rfl: 0    insulin lispro (HumaLOG) 100 units/mL injection, Inject under the skin, Disp: , Rfl:     Insulin Pen Needle (PEN NEEDLES 31GX5/16") 31G X 8 MM MISC, Use 2 (two) times a day, Disp: 30 each, Rfl: 1    labetalol (NORMODYNE) 300 mg tablet, Take 1 tablet (300 mg total) by mouth every 12 (twelve) hours, Disp: , Rfl: 0    Lantus SoloStar 100 units/mL SOPN, 9 UNITS SUBCUTANEOUS AT BEDTIME, Disp: , Rfl:     lidocaine (LIDODERM) 5 %, Apply 1 patch topically daily Remove & Discard patch within 12 hours or as directed by MD, Disp: , Rfl:     losartan (COZAAR) 100 MG tablet, Take 1 tablet (100 mg total) by mouth daily, Disp: 90 tablet, Rfl: 1    pravastatin (PRAVACHOL) 80 mg tablet, Take 1 tablet (80 mg total) by mouth daily with dinner, Disp: 30 tablet, Rfl: 0    ticagrelor (BRILINTA) 90 MG, Take 1 tablet (90 mg total) by mouth every 12 (twelve) hours, Disp: 60 tablet, Rfl: 5    traZODone (DESYREL) 50 mg tablet, Take 1 tablet (50 mg total) by mouth daily at bedtime, Disp: 30 tablet, Rfl: 0    Zinc Oxide, Topical, 16 & 40 % KIT, Apply topically 2 (two) times a day Pressure Ulcer, Disp: , Rfl:     PHYSICAL EXAMINATION     Physical Exam  Vitals reviewed. Constitutional:       General: She is not in acute distress. HENT:      Head: Normocephalic. Mouth/Throat:      Lips: Pink. Mouth: Mucous membranes are moist.   Eyes:      General: Lids are normal. No scleral icterus. Cardiovascular:      Rate and Rhythm: Normal rate and regular rhythm. Heart sounds: S1 normal and S2 normal.   Pulmonary:      Effort: Pulmonary effort is normal. No accessory muscle usage or respiratory distress. Breath sounds: Normal breath sounds. Abdominal:      General: There is no distension. Tenderness: There is no abdominal tenderness. Musculoskeletal:      Cervical back: Normal range of motion and neck supple. No tenderness. Right lower leg: No edema. Left lower leg: No edema. Skin:     General: Skin is warm.       Coloration: Skin is not cyanotic or jaundiced. Neurological:      General: No focal deficit present. Mental Status: She is alert and oriented to person, place, and time. Psychiatric:         Attention and Perception: Attention normal.         Speech: Speech normal.         Behavior: Behavior is cooperative. LAB RESULTS     Results from last 7 days   Lab Units 11/02/23  1139   SODIUM mmol/L 139   POTASSIUM mmol/L 3.7   CHLORIDE mmol/L 104   CO2 mmol/L 24   BUN mg/dL 19   CREATININE mg/dL 1.75*   EGFR ml/min/1.73sq m 27   CALCIUM mg/dL 9.5         RADIOLOGY RESULTS      Results for orders placed during the hospital encounter of 01/22/22    XR chest portable    Narrative  CHEST    INDICATION:   cough. Patient has confirmed COVID-19. COMPARISON:  None    EXAM PERFORMED/VIEWS:  XR CHEST PORTABLE      FINDINGS:    Cardiomediastinal silhouette appears unremarkable. Mild bilateral infiltrates. No pneumothorax or pleural effusion. Osseous structures appear within normal limits for patient age. Impression  Bilateral infiltrates compatible with confirmed COVID 19 infection. Workstation performed: [id number]    Ultrasound of the kidney and bladder 9/6/2023:  Knee symmetric and normal in size. Right kidney with a 1.4 x 1.7 x 1.5 cm cyst.  Cystic lesion in the right kidney which appears to have thickened, perceptible wall. Recommend further evaluation of this finding with either contrast-enhanced CT or MRI using renal mass protocol on patient's renal function recovers. ASSESSMENT/PLAN     Chronic kidney disease stage IV:  Hospitalization with RICARDO and peak creatinine of 2.26, etiology felt secondary to ATN in setting of contrast nephropathy. After review of the medical record, it appears that current creatinine levels have been fluctuating from 1.6-1.9. Current creatinine level is 1.75 with an EGFR of 27, placing patient in CKD stage IV category.     Her office visit with Dr. Pio Frederick on 8/7/2023, dialysis was discussed in length with the patient. At that time, she stated that she would not like to pursue any form of dialysis even though can be a life-saving procedure for progressive chronic kidney disease. Stated that she would like to be DNR and maintain comfort without dialysis in the event of decline in renal function. I had a discussion again with the patient and her son today and she verified that she does not want to pursue any form of dialysis. Plan for ACP visit with next follow-up. Complex renal cyst:  On most recent renal ultrasound in September 2023, right kidney with a 1.4 x 1.7 x 1.5 cm cyst which appears to have a thickened perceivable wall. Patient was recommended to undergo further evaluation with contrast-enhanced CT or MRI using renal mass protocol. In light of EGFR below 30%, patient is not a candidate for MRI with gadolinium. Given recent RICARDO in the setting of contrast-induced nephropathy, advised monitoring for stability of renal function over the next few months prior to proceeding with additional CT imaging with administration of IV contrast.  Patient will require IV hydration once test is scheduled. Patient and son do have concerns regarding any test with IV contrast due to her recurrent history with developing acute kidney injuries. Hypertension:  Currently maintained on amlodipine 5 mg daily, clonidine patch 0.3 mg weekly, labetalol 300 mg twice daily, and losartan 100 mg daily. Blood pressure appears to be elevated on visit today. Advise she begin monitoring home blood pressure readings with a goal blood pressure 130/80 and contact our office if consistently elevated. Undergone renal artery Doppler January 2020 which was shown to have greater than 60% stenosis of the right main renal artery currently undergoing medical management and blood pressure control. Hyperuricemia:  Etiology likely multifactorial and suspected secondary to underlying CKD and gout.   She is actively maintained on allopurinol 100 mg daily. No updated uric acid levels prior to visit today, advised updated labs prior to follow-up. Secondary hyperparathyroidism of renal origin:  In the past, patient was diagnosed with hypercalcemia and chlorthalidone as well as cholecalciferol were discontinued around that time. She was advised to undergo nuclear medicine parathyroid scan with SPECT, but at the time patient declined imaging. Actively maintained on Sensipar 30 mg every other day. Most recent BMP showed calcium level within normal limits at 9.5. Advised updated PTH and vitamin D levels prior to follow-up. Proteinuria:  Recent urinalysis in September 2023 showed 2+ protein, no updated microalbumin to creatinine ratio prior to visit. Prior SPEP on 8/3/2023 showed M spike UPEP showed nonselective proteinuria, no M spike on immunofixation. Was referred to hematology on last office visit for further evaluation, has not made appointment to date and encouraged to follow-up. Type 2 diabetes: Actively maintained on Lantus and Humalog insulin. Recent hemoglobin A1c was 7.3, discussed importance of good glycemic control in the setting of known CKD and proteinuria and recommend a goal hemoglobin A1c of 7.0 or less. Recommend follow-up with in 3 to 4 months with updated CKD labs. Olya Bell, [facility]  Nephrology  11/7/2023      Portions of the record may have been created with voice recognition software. Occasional wrong word or "sound a like" substitutions may have occurred due to the inherent limitations of voice recognition software. Read the chart carefully and recognize, using context, where substitutions have occurred.

## 2023-11-07 ENCOUNTER — TELEPHONE (OUTPATIENT)
Dept: NEPHROLOGY | Facility: CLINIC | Age: 78
End: 2023-11-07

## 2023-11-07 ENCOUNTER — TELEPHONE (OUTPATIENT)
Dept: HEMATOLOGY ONCOLOGY | Facility: CLINIC | Age: 78
End: 2023-11-07

## 2023-11-07 ENCOUNTER — OFFICE VISIT (OUTPATIENT)
Dept: NEPHROLOGY | Facility: CLINIC | Age: 78
End: 2023-11-07
Payer: COMMERCIAL

## 2023-11-07 ENCOUNTER — HOSPITAL ENCOUNTER (OUTPATIENT)
Dept: NON INVASIVE DIAGNOSTICS | Facility: CLINIC | Age: 78
Discharge: HOME | End: 2023-11-07
Payer: COMMERCIAL

## 2023-11-07 VITALS
HEART RATE: 61 BPM | BODY MASS INDEX: 28.07 KG/M2 | HEIGHT: 60 IN | WEIGHT: 143 LBS | OXYGEN SATURATION: 97 % | RESPIRATION RATE: 16 BRPM | DIASTOLIC BLOOD PRESSURE: 98 MMHG | TEMPERATURE: 97.1 F | SYSTOLIC BLOOD PRESSURE: 164 MMHG

## 2023-11-07 DIAGNOSIS — N28.1 COMPLEX RENAL CYST: ICD-10-CM

## 2023-11-07 DIAGNOSIS — E79.0 HYPERURICEMIA: ICD-10-CM

## 2023-11-07 DIAGNOSIS — I12.9 HYPERTENSIVE KIDNEY DISEASE WITH STAGE 4 CHRONIC KIDNEY DISEASE (HCC): ICD-10-CM

## 2023-11-07 DIAGNOSIS — N18.4 HYPERTENSIVE KIDNEY DISEASE WITH STAGE 4 CHRONIC KIDNEY DISEASE (HCC): ICD-10-CM

## 2023-11-07 DIAGNOSIS — I65.22 SYMPTOMATIC CAROTID ARTERY STENOSIS, LEFT: ICD-10-CM

## 2023-11-07 DIAGNOSIS — R80.9 PROTEINURIA, UNSPECIFIED TYPE: ICD-10-CM

## 2023-11-07 DIAGNOSIS — I73.9 CLAUDICATION IN PERIPHERAL VASCULAR DISEASE (HCC): ICD-10-CM

## 2023-11-07 DIAGNOSIS — N18.4 STAGE 4 CHRONIC KIDNEY DISEASE (HCC): Primary | ICD-10-CM

## 2023-11-07 DIAGNOSIS — N25.81 SECONDARY HYPERPARATHYROIDISM OF RENAL ORIGIN (HCC): ICD-10-CM

## 2023-11-07 PROCEDURE — 93923 UPR/LXTR ART STDY 3+ LVLS: CPT

## 2023-11-07 PROCEDURE — 93978 VASCULAR STUDY: CPT

## 2023-11-07 PROCEDURE — 99214 OFFICE O/P EST MOD 30 MIN: CPT

## 2023-11-07 NOTE — TELEPHONE ENCOUNTER
Appointment Change  Cancel, Reschedule, Change to Virtual      Who are you speaking with? Patient   If it is not the patient, is the caller listed on the communication consent form? Yes   Which provider is the appointment scheduled with? Lili Jensen PA-C   When was the original appointment scheduled? Please list date and time 9/25   At which location is the appointment scheduled to take place? [place] ([facility])   Was the appointment rescheduled? Was the appointment changed from an in person visit to a virtual visit? If so, please list the details of the change. 1/17 1:40pm keisha   What is the reason for the appointment change? Hospitalizations - stroke       Was STAR transport scheduled? No   Does STAR transport need to be scheduled for the new visit (if applicable) No   Does the patient need an infusion appointment rescheduled? No   Does the patient have an upcoming infusion appointment scheduled? If so, when? No   Is the patient undergoing chemotherapy? No   For appointments cancelled with less than 24 hours:  Was the no-show policy reviewed?  Yes

## 2023-11-07 NOTE — LETTER
November 7, 2023     [facility], [address]    Patient: Anayeli Kaur   YOB: 1945   Date of Visit: 11/7/2023       Dear Dr. Erasmo Vargas: Thank you for referring Deedee Gunter to me for evaluation. Below are my notes for this consultation. If you have questions, please do not hesitate to call me. I look forward to following your patient along with you. Sincerely,        GRACIELA Garcia        CC: No Recipients    Anders Infante, [address] [place] [address]  11/7/2023  5:01 PM  Sign when Signing Visit  NEPHROLOGY OFFICE NOTE    Patient: Anayeli Kaur               Sex: female           YOB: 1945        Age:  70 y.o.       11/7/2023      BACKGROUND     I had the pleasure of seeing Deedee Gunter in the nephrology office today for hospital follow-up. She has a past medical history significant for GERD, mesenteric artery stenosis, type 2 diabetes with diabetic nephropathy, type 2 diabetes, hypertension, renal artery stenosis, prior CVA, CKD stage IV, complex renal cyst, vitamin D deficiency, and hyperlipidemia. He was hospitalized at [address] [place] [address] from 9/3 to 9/9 presenting with onset of right upper and lower extremity weakness while at skilled nursing facility and found to have acute CVA. She was followed by neurology and actively maintained on aspirin, Brilinta, and pravastatin. She has underlying symptomatic ICA stenosis and underwent left TCAR on 9/7/2023 during hospitalization, felt to be etiology of above stroke. Currently follows with Dr. Kenia Barrera for CKD management and was last seen in the office on 8/7/2023. She has underlying CKD stage IIIb/IV, after review of the medical record it does appear that her baseline creatinine fluctuates around 1.5-1.8.   She has a known cystic lesion in the right kidney that was present on renal ultrasound, advised follow-up with contrast-enhanced CT scan or MRI using renal mass protocol once renal function stable. He has underlying hypertension, actively maintained on amlodipine, clonidine patch, losartan, and labetalol. She underwent renal artery Doppler on 1/7/2020 and was found to have a greater than 60% narrowing of the right main renal artery with left side difficult to evaluate. Most recent labs were obtained on 11/2/2023, which we have reviewed together. SUBJECTIVE     She currently has no complaints at this time and is feeling well. Patient denies any chest pain, shortness of breath, or swelling. REVIEW OF SYSTEMS     Review of Systems   Constitutional:  Positive for activity change and fatigue. Negative for chills and fever. HENT:  Negative for trouble swallowing. Respiratory:  Negative for shortness of breath. Cardiovascular:  Negative for leg swelling. Gastrointestinal:  Negative for nausea and vomiting. Genitourinary:  Negative for difficulty urinating, dysuria, frequency and hematuria. Musculoskeletal:  Negative for back pain. Skin:  Negative for pallor. Neurological:  Positive for weakness. Negative for dizziness, syncope and light-headedness. Psychiatric/Behavioral:  Negative for sleep disturbance. The patient is not nervous/anxious. OBJECTIVE     Current Weight: Weight - Scale: 64.9 kg (143 lb)  Vitals:    11/07/23 1514   BP: 164/98   Pulse: 61   Resp: 16   Temp: (!) 97.1 °F (36.2 °C)   SpO2: 97%     Body mass index is 27.93 kg/m².     CURRENT MEDICATIONS       Current Outpatient Medications:   •  acetaminophen (TYLENOL) 500 mg tablet, Take 650 mg by mouth every 6 (six) hours as needed for mild pain, Disp: , Rfl:   •  allopurinol (ZYLOPRIM) 100 mg tablet, TAKE 1 TABLET BY MOUTH EVERY DAY, Disp: 90 tablet, Rfl: 2  •  amLODIPine (NORVASC) 5 mg tablet, Take 1 tablet (5 mg total) by mouth daily, Disp: 90 tablet, Rfl: 0  •  aspirin 81 mg chewable tablet, Chew 81 mg daily, Disp: , Rfl:   •  cinacalcet (SENSIPAR) 30 mg tablet, Take 1 tablet (30 mg total) by mouth every other day, Disp: 45 tablet, Rfl: 2  •  cloNIDine (CATAPRES-TTS-3) 0.3 mg/24 hr, PLACE 1 PATCH (0.3 MG TOTAL) ON THE SKIN ONCE A WEEK, Disp: 12 patch, Rfl: 1  •  escitalopram (LEXAPRO) 10 mg tablet, Take 1 tablet (10 mg total) by mouth daily, Disp: 90 tablet, Rfl: 0  •  Glucagon 1 MG/0.2ML SOAJ, Inject 1 mg under the skin if needed (low blood sugar), Disp: , Rfl:   •  insulin glargine (LANTUS) 100 units/mL subcutaneous injection, Inject 8 Units under the skin daily at bedtime, Disp: 10 mL, Rfl: 0  •  insulin lispro (HumaLOG) 100 units/mL injection, Inject under the skin, Disp: , Rfl:   •  Insulin Pen Needle (PEN NEEDLES 31GX5/16") 31G X 8 MM MISC, Use 2 (two) times a day, Disp: 30 each, Rfl: 1  •  labetalol (NORMODYNE) 300 mg tablet, Take 1 tablet (300 mg total) by mouth every 12 (twelve) hours, Disp: , Rfl: 0  •  Lantus SoloStar 100 units/mL SOPN, 9 UNITS SUBCUTANEOUS AT BEDTIME, Disp: , Rfl:   •  lidocaine (LIDODERM) 5 %, Apply 1 patch topically daily Remove & Discard patch within 12 hours or as directed by MD, Disp: , Rfl:   •  losartan (COZAAR) 100 MG tablet, Take 1 tablet (100 mg total) by mouth daily, Disp: 90 tablet, Rfl: 1  •  pravastatin (PRAVACHOL) 80 mg tablet, Take 1 tablet (80 mg total) by mouth daily with dinner, Disp: 30 tablet, Rfl: 0  •  ticagrelor (BRILINTA) 90 MG, Take 1 tablet (90 mg total) by mouth every 12 (twelve) hours, Disp: 60 tablet, Rfl: 5  •  traZODone (DESYREL) 50 mg tablet, Take 1 tablet (50 mg total) by mouth daily at bedtime, Disp: 30 tablet, Rfl: 0  •  Zinc Oxide, Topical, 16 & 40 % KIT, Apply topically 2 (two) times a day Pressure Ulcer, Disp: , Rfl:     PHYSICAL EXAMINATION     Physical Exam  Vitals reviewed. Constitutional:       General: She is not in acute distress. HENT:      Head: Normocephalic. Mouth/Throat:      Lips: Pink.       Mouth: Mucous membranes are moist.   Eyes:      General: Lids are normal. No scleral icterus. Cardiovascular:      Rate and Rhythm: Normal rate and regular rhythm. Heart sounds: S1 normal and S2 normal.   Pulmonary:      Effort: Pulmonary effort is normal. No accessory muscle usage or respiratory distress. Breath sounds: Normal breath sounds. Abdominal:      General: There is no distension. Tenderness: There is no abdominal tenderness. Musculoskeletal:      Cervical back: Normal range of motion and neck supple. No tenderness. Right lower leg: No edema. Left lower leg: No edema. Skin:     General: Skin is warm. Coloration: Skin is not cyanotic or jaundiced. Neurological:      General: No focal deficit present. Mental Status: She is alert and oriented to person, place, and time. Psychiatric:         Attention and Perception: Attention normal.         Speech: Speech normal.         Behavior: Behavior is cooperative. LAB RESULTS     Results from last 7 days   Lab Units 11/02/23  1139   SODIUM mmol/L 139   POTASSIUM mmol/L 3.7   CHLORIDE mmol/L 104   CO2 mmol/L 24   BUN mg/dL 19   CREATININE mg/dL 1.75*   EGFR ml/min/1.73sq m 27   CALCIUM mg/dL 9.5         RADIOLOGY RESULTS      Results for orders placed during the hospital encounter of 01/22/22    XR chest portable    Narrative  CHEST    INDICATION:   cough. Patient has confirmed COVID-19. COMPARISON:  None    EXAM PERFORMED/VIEWS:  XR CHEST PORTABLE      FINDINGS:    Cardiomediastinal silhouette appears unremarkable. Mild bilateral infiltrates. No pneumothorax or pleural effusion. Osseous structures appear within normal limits for patient age. Impression  Bilateral infiltrates compatible with confirmed COVID 19 infection. Workstation performed: [id number]    Ultrasound of the kidney and bladder 9/6/2023:  Knee symmetric and normal in size. Right kidney with a 1.4 x 1.7 x 1.5 cm cyst.  Cystic lesion in the right kidney which appears to have thickened, perceptible wall. Recommend further evaluation of this finding with either contrast-enhanced CT or MRI using renal mass protocol on patient's renal function recovers. ASSESSMENT/PLAN     Chronic kidney disease stage IV:  Hospitalization with RICARDO and peak creatinine of 2.26, etiology felt secondary to ATN in setting of contrast nephropathy. After review of the medical record, it appears that current creatinine levels have been fluctuating from 1.6-1.9. Current creatinine level is 1.75 with an EGFR of 27, placing patient in CKD stage IV category. Her office visit with Dr. Haley Campbell on 8/7/2023, dialysis was discussed in length with the patient. At that time, she stated that she would not like to pursue any form of dialysis even though can be a life-saving procedure for progressive chronic kidney disease. Stated that she would like to be DNR and maintain comfort without dialysis in the event of decline in renal function. I had a discussion again with the patient and her son today and she verified that she does not want to pursue any form of dialysis. Plan for ACP visit with next follow-up. Complex renal cyst:  On most recent renal ultrasound in September 2023, right kidney with a 1.4 x 1.7 x 1.5 cm cyst which appears to have a thickened perceivable wall. Patient was recommended to undergo further evaluation with contrast-enhanced CT or MRI using renal mass protocol. In light of EGFR below 30%, patient is not a candidate for MRI with gadolinium. Given recent RICARDO in the setting of contrast-induced nephropathy, advised monitoring for stability of renal function over the next few months prior to proceeding with additional CT imaging with administration of IV contrast.  Patient will require IV hydration once test is scheduled. Patient and son do have concerns regarding any test with IV contrast due to her recurrent history with developing acute kidney injuries.     Hypertension:  Currently maintained on amlodipine 5 mg daily, clonidine patch 0.3 mg weekly, labetalol 300 mg twice daily, and losartan 100 mg daily. Blood pressure appears to be elevated on visit today. Advise she begin monitoring home blood pressure readings with a goal blood pressure 130/80 and contact our office if consistently elevated. Undergone renal artery Doppler January 2020 which was shown to have greater than 60% stenosis of the right main renal artery currently undergoing medical management and blood pressure control. Hyperuricemia:  Etiology likely multifactorial and suspected secondary to underlying CKD and gout. She is actively maintained on allopurinol 100 mg daily. No updated uric acid levels prior to visit today, advised updated labs prior to follow-up. Secondary hyperparathyroidism of renal origin:  In the past, patient was diagnosed with hypercalcemia and chlorthalidone as well as cholecalciferol were discontinued around that time. She was advised to undergo nuclear medicine parathyroid scan with SPECT, but at the time patient declined imaging. Actively maintained on Sensipar 30 mg every other day. Most recent BMP showed calcium level within normal limits at 9.5. Advised updated PTH and vitamin D levels prior to follow-up. Proteinuria:  Recent urinalysis in September 2023 showed 2+ protein, no updated microalbumin to creatinine ratio prior to visit. Prior SPEP on 8/3/2023 showed M spike UPEP showed nonselective proteinuria, no M spike on immunofixation. Was referred to hematology on last office visit for further evaluation, has not made appointment to date and encouraged to follow-up. Type 2 diabetes: Actively maintained on Lantus and Humalog insulin. Recent hemoglobin A1c was 7.3, discussed importance of good glycemic control in the setting of known CKD and proteinuria and recommend a goal hemoglobin A1c of 7.0 or less. Recommend follow-up with in 3 to 4 months with updated CKD labs.     [place] Danica Jin, [address] [facility]  Nephrology  11/7/2023      Portions of the record may have been created with voice recognition software. Occasional wrong word or "sound a like" substitutions may have occurred due to the inherent limitations of voice recognition software. Read the chart carefully and recognize, using context, where substitutions have occurred.

## 2023-11-07 NOTE — PATIENT INSTRUCTIONS
Start checking your blood pressure at home, goal average 130/80     Chronic Kidney Disease   AMBULATORY CARE:   Chronic kidney disease (CKD)  is the gradual and permanent loss of kidney function. Normally, the kidneys remove fluid, chemicals, and waste from your blood. These wastes are turned into urine by your kidneys. CKD may worsen over time and lead to kidney failure. Common signs and symptoms include the following:   Changes in how often you need to urinate    Swelling in your arms, legs, or feet    Shortness of breath    Fatigue or weakness    Bad or bitter taste in your mouth    Nausea, vomiting, or loss of appetite    Call your local emergency number (911 in the [address]) if:   You have a seizure. You have shortness of breath. Seek care immediately if:   You are confused and very drowsy. Call your doctor or nephrologist if:   You suddenly gain or lose more weight than your healthcare provider has told you is okay. You have itchy skin or a rash. You urinate more or less than you normally do. You have blood in your urine. You have nausea and are vomiting. You have fatigue or muscle weakness. You have hiccups that will not stop. You have questions or concerns about your condition or care. How CKD is diagnosed:  CKD has 5 stages. Your healthcare provider will use results from the following tests to find the stage of CKD you have:  Blood and urine tests  show how well your kidneys are working. They may also show the cause of your CKD. Ultrasound, CT scan, or MRI  pictures may be used to check your kidneys. You may be given contrast liquid to help your kidneys show up better in the pictures. Tell the healthcare provider if you have ever had an allergic reaction to contrast liquid. Do not enter the MRI room with anything metal. Metal can cause serious injury. Tell the healthcare provider if you have any metal in or on your body.     A biopsy  is a procedure to remove a small piece of tissue from your kidney. It is done to find the cause of your CKD. Treatment  can help control signs and symptoms, and prevent a worse stage of CKD. Your care team may include specialists, such as a dietitian or a heart specialist. This depends on the stage of your CKD and if you have other health conditions to manage. Healthcare providers will work with you to create a plan based on your decisions for treatment. Your treatment plan may include any of the following:  Medicines  may be given to decrease your blood pressure and get rid of extra fluid. You may also receive medicine to manage health conditions that may occur with CKD, such as anemia, diabetes, and heart disease. Dialysis  is a treatment to remove chemicals and waste from your blood when your kidneys can no longer do this. Surgery  may be needed to create an arteriovenous fistula (AVF) in your arm or insert a catheter into your abdomen. This is done so you can receive dialysis. A kidney transplant  may be done if your CKD becomes severe. What you can do to manage CKD: Management may include making some lifestyle changes. Tell your healthcare provider if you have any concerns about being able to make changes. He or she can help you find solutions, including working with specialists. Ask for help creating a plan to break large goals into smaller steps. Your plan may include any of the following:  Manage other health conditions. Your healthcare provider will work with you to make a care plan that meets your needs. You will be checked regularly for heart disease or other conditions that can make CKD worse, such as diabetes. Your blood pressure will be closely monitored. You will also get a target blood pressure and help making a plan to reach your target. This may include taking your blood pressure at home. Maintain a healthy weight. Your weight and body mass index (BMI) will be checked regularly.  BMI helps find if your weight is healthy for your height. Your healthcare provider will use other tests to check your muscle and protein levels. Extra weight can strain your kidneys. A low weight or low muscle mass can make you feel more tired. You may have trouble doing your daily activities. Ask your provider what a healthy weight is for you. He or she can help you create a plan to lose or gain weight safely, if needed. The plan may include keeping a food diary. This is a list of foods and liquids you have each day. Your provider will use the diary to help you make changes, if needed. Changes are based on your health and any other conditions you have, such as diabetes. Create an exercise plan. Regular exercise can help you manage CKD, high blood pressure, and diabetes. Exercise also helps control weight. Your provider can help you create exercise goals and a plan to reach those goals. For example, your goal may be to exercise for 30 minutes in a day. Your plan can include breaking exercise into 10 minute sessions, 3 times during the day. Create a healthy eating plan. Your provider may tell you to eat food low in potassium, phosphorus, or protein. Your provider may also recommend vitamin or mineral supplements. Do not take any supplements without talking to your provider. A dietitian can help you plan meals if needed. Ask how much liquid to drink each day and which liquids are best for you. Limit sodium (salt) as directed. You may need to limit sodium to less than 2,300 milligrams (mg) each day. Ask your dietitian or healthcare provider how much sodium you can have each day. The amount depends on your stage of kidney disease. Table salt, canned foods, soups, salted snacks, and processed meats, like deli meats and sausage, are high in sodium. Your provider or a dietitian can show you how to read food labels for sodium. Limit alcohol as directed. Alcohol can cause fluid retention and can affect your kidneys.  Ask how much alcohol is safe for you. A drink of alcohol is 12 ounces of beer, 5 ounces of wine, or 1½ ounces of liquor. Do not smoke. Nicotine and other chemicals in cigarettes and cigars can cause kidney damage. Ask your provider for information if you currently smoke and need help to quit. E-cigarettes or smokeless tobacco still contain nicotine. Talk to your provider before you use these products. Ask about over-the-counter medicines. Medicines such as NSAIDs and laxatives may harm your kidneys. Some cough and cold medicines can raise your blood pressure. Always ask if a medicine is safe before you take it. Ask about vaccines you may need. CKD can increase your risk for infections such as pneumonia, influenza, and hepatitis. Vaccines lower your risk for infection. Your healthcare provider will tell you which vaccines you need and when to get them. Follow up with your doctor or nephrologist as directed: You will need to return for tests to monitor your kidney and nerve function, and your parathyroid hormone level. Your medicines may be changed, based on certain test results. Write down your questions so you remember to ask them during your visits. © Copyright Loletta Gosselin 2023 Information is for End User's use only and may not be sold, redistributed or otherwise used for commercial purposes. The above information is an  only. It is not intended as medical advice for individual conditions or treatments. Talk to your doctor, nurse or pharmacist before following any medical regimen to see if it is safe and effective for you.

## 2023-11-09 PROCEDURE — 93978 VASCULAR STUDY: CPT | Performed by: SURGERY

## 2023-11-09 PROCEDURE — 93922 UPR/L XTREMITY ART 2 LEVELS: CPT | Performed by: SURGERY

## 2023-11-15 ENCOUNTER — HOSPITAL ENCOUNTER (OUTPATIENT)
Dept: NON INVASIVE DIAGNOSTICS | Facility: CLINIC | Age: 78
Discharge: HOME | End: 2023-11-15
Payer: COMMERCIAL

## 2023-11-15 DIAGNOSIS — I65.22 SYMPTOMATIC CAROTID ARTERY STENOSIS, LEFT: ICD-10-CM

## 2023-11-15 DIAGNOSIS — I73.9 CLAUDICATION IN PERIPHERAL VASCULAR DISEASE (HCC): ICD-10-CM

## 2023-11-15 PROCEDURE — 93925 LOWER EXTREMITY STUDY: CPT

## 2023-11-15 PROCEDURE — 93923 UPR/LXTR ART STDY 3+ LVLS: CPT

## 2023-11-15 PROCEDURE — 93880 EXTRACRANIAL BILAT STUDY: CPT

## 2023-11-20 DIAGNOSIS — F33.9 DEPRESSION, RECURRENT (HCC): ICD-10-CM

## 2023-11-20 DIAGNOSIS — I10 PRIMARY HYPERTENSION: ICD-10-CM

## 2023-11-20 PROCEDURE — 93925 LOWER EXTREMITY STUDY: CPT | Performed by: SURGERY

## 2023-11-20 PROCEDURE — 93880 EXTRACRANIAL BILAT STUDY: CPT | Performed by: SURGERY

## 2023-11-20 PROCEDURE — 93922 UPR/L XTREMITY ART 2 LEVELS: CPT | Performed by: SURGERY

## 2023-11-20 RX ORDER — AMLODIPINE BESYLATE 5 MG/1
5 TABLET ORAL DAILY
Qty: 90 TABLET | Refills: 0 | Status: SHIPPED | OUTPATIENT
Start: 2023-11-20 | End: 2024-02-18

## 2023-11-20 RX ORDER — TRAZODONE HYDROCHLORIDE 50 MG/1
50 TABLET ORAL
Qty: 30 TABLET | Refills: 0 | Status: SHIPPED | OUTPATIENT
Start: 2023-11-20

## 2023-11-20 NOTE — TELEPHONE ENCOUNTER
Patient is requesting a refill on amlodipine 5 mg tablet, take 1 tablet  (5 mg total) by mouth daily Rhoda Stall, son, [facility] pharmacy dispensed 30 tablets); trazodone 50 mg tablet, take 1 tablet (50 mg total) by mouth daily at bedtime.  90 day supply    [facility]    Call back #[phone]

## 2023-11-21 ENCOUNTER — OFFICE VISIT (OUTPATIENT)
Dept: VASCULAR SURGERY | Facility: CLINIC | Age: 78
End: 2023-11-21

## 2023-11-21 VITALS
HEART RATE: 69 BPM | DIASTOLIC BLOOD PRESSURE: 78 MMHG | BODY MASS INDEX: 28.07 KG/M2 | WEIGHT: 143 LBS | HEIGHT: 60 IN | SYSTOLIC BLOOD PRESSURE: 126 MMHG

## 2023-11-21 DIAGNOSIS — I65.21 CAROTID STENOSIS, ASYMPTOMATIC, RIGHT: ICD-10-CM

## 2023-11-21 DIAGNOSIS — I70.201 FEMORAL ARTERY STENOSIS, RIGHT (HCC): ICD-10-CM

## 2023-11-21 DIAGNOSIS — I70.1 ATHEROSCLEROSIS OF RENAL ARTERY (HCC): ICD-10-CM

## 2023-11-21 DIAGNOSIS — I70.221 ATHEROSCLEROSIS OF NATIVE ARTERIES OF EXTREMITIES WITH REST PAIN, RIGHT LEG (HCC): Primary | ICD-10-CM

## 2023-11-21 DIAGNOSIS — I70.1 RIGHT RENAL ARTERY STENOSIS (HCC): ICD-10-CM

## 2023-11-21 DIAGNOSIS — K55.1 MESENTERIC ARTERY STENOSIS (HCC): ICD-10-CM

## 2023-11-21 DIAGNOSIS — I63.9 ACUTE CVA (CEREBROVASCULAR ACCIDENT) (HCC): ICD-10-CM

## 2023-11-21 DIAGNOSIS — Z78.9 STATIN INTOLERANCE: ICD-10-CM

## 2023-11-21 DIAGNOSIS — I70.0 AORTOILIAC STENOSIS, LEFT (HCC): ICD-10-CM

## 2023-11-21 DIAGNOSIS — I74.09 AORTOILIAC OCCLUSIVE DISEASE (HCC): ICD-10-CM

## 2023-11-21 DIAGNOSIS — N18.4 STAGE 4 CHRONIC KIDNEY DISEASE (HCC): ICD-10-CM

## 2023-11-21 DIAGNOSIS — I65.22 SYMPTOMATIC CAROTID ARTERY STENOSIS, LEFT: ICD-10-CM

## 2023-11-21 PROBLEM — E66.01 OBESITY, MORBID (HCC): Status: RESOLVED | Noted: 2021-01-20 | Resolved: 2023-11-21

## 2023-11-21 PROBLEM — E66.9 OBESITY (BMI 30-39.9): Status: RESOLVED | Noted: 2017-12-04 | Resolved: 2023-11-21

## 2023-11-21 PROBLEM — I69.320 APHASIA AS LATE EFFECT OF CEREBROVASCULAR ACCIDENT (CVA): Status: RESOLVED | Noted: 2023-08-16 | Resolved: 2023-11-21

## 2023-11-21 PROBLEM — I74.3 EMBOLISM AND THROMBOSIS OF ARTERIES OF THE LOWER EXTREMITIES (HCC): Status: RESOLVED | Noted: 2021-01-20 | Resolved: 2023-11-21

## 2023-11-21 PROCEDURE — 99024 POSTOP FOLLOW-UP VISIT: CPT | Performed by: SURGERY

## 2023-11-21 NOTE — ASSESSMENT & PLAN NOTE
Reviewed CT scan noncontrast abdomen pelvis from 2021 in detail, extensively atherosclerotic plaque along the entire aortoiliac segment especially in the right common/external iliac artery. Treatment would be significantly challenging especially considering chronic kidney disease stage III    Patient's symptoms have worsened with short distance right leg claudication and occasional rest pain with a drop in LISA of 0.3.

## 2023-11-21 NOTE — ASSESSMENT & PLAN NOTE
Underwent left TCAR with good results. Neck incision has healed well and she remains neurologically stable. Recent Doppler also shows wide patency of the stent. Follow-up Doppler in 6 months.

## 2023-11-21 NOTE — PROGRESS NOTES
Assessment/Plan:    Acute CVA (cerebrovascular accident) ([address])  Significant improvement in symptoms, right hand symptoms/weakness have resolved, aphasia is nearly resolved. Patient underwent left TCAR for high-grade carotid stenosis currently on Brilinta. Aortoiliac occlusive disease ([address])  Reviewed CT scan noncontrast abdomen pelvis from 2021 in detail, extensively atherosclerotic plaque along the entire aortoiliac segment especially in the right common/external iliac artery. Treatment would be significantly challenging especially considering chronic kidney disease stage III    Patient's symptoms have worsened with short distance right leg claudication and occasional rest pain with a drop in LISA of 0.3. Atherosclerosis of native arteries of extremities with rest pain, right leg [facility])  68-year-old lady with chronic kidney disease stage IV, hypertension, hyperlipidemia with extensive cardiovascular history including recent stroke and left carotid stenting. Her right leg symptoms have worsened over the past few months in spite of being on maximal medical therapy. Looking at previous CT scans which are noncontrast I can see heavily calcified aortoiliac plaque which is nearly occlusive in the right common/external iliac segment. There is also high-grade stenosis of right common femoral with calcified plaque. The right SFA is occluded. Her LISA on the right side is reduced from 0.5-0. 29. Unfortunately she requires further work-up with CT angiogram which would involve administration of contrast.  In the past patient has had a decline in the renal function after contrast administration. She absolutely refuses dialysis. She has stated with a calm voice that in case she requires dialysis she would prefer to die. I will have further discussions with her nephrologist prior to deciding on next course of action.   At this point as her symptoms are mainly short distance claudication and occasional rest pain with no tissue loss we can continue to watch this. I have given her and her son instructions on warning signs which would include any new wound or worsening rest pain. In that case we may have to proceed to the next which would be a CT angiogram with IV hydration and further steps based on that. Extensive discussion today discussing the risks and benefits of approaches, limited endovascular options are available due to the nature and location of the disease process. Complexity of endovascular and or hybrid approach such as femoral endarterectomy and its detrimental effect on renal function were discussed. I have spent a total time of 60 minutes on 11/21/23 in caring for this patient including Diagnostic results, Risks and benefits of tx options, Counseling / Coordination of care, Reviewing / ordering tests, medicine, procedures  , Obtaining or reviewing history  , and Communicating with other healthcare professionals . Carotid stenosis, asymptomatic, right  High-grade asymptomatic right carotid artery stenosis  Recently had left TCAR for symptomatic carotid stenosis    Continue medical management of asymptomatic right side stenosis. In spite of previous known statin allergy patient is now able to tolerate Pravachol 80 mg/day. Symptomatic carotid artery stenosis, left  Underwent left TCAR with good results. Neck incision has healed well and she remains neurologically stable. Recent Doppler also shows wide patency of the stent. Follow-up Doppler in 6 months. Statin intolerance  Patient is now tolerating Pravachol 80 mg. Stage 4 chronic kidney disease [facility])  Lab Results   Component Value Date    EGFR 27 11/02/2023    EGFR 26 09/09/2023    EGFR 29 09/08/2023    CREATININE 1.75 (H) 11/02/2023    CREATININE 1.82 (H) 09/09/2023    CREATININE 1.66 (H) 09/08/2023   High risk of progression into dialysis with any contrast based intervention or significant surgical bleeding.     Right renal artery stenosis ([address])  Heavily calcified aorta, high risk intervention,    Mesenteric artery stenosis ([address])  Denies any symptoms, patient remains asymptomatic continue with routine antiplatelet and statin. Diagnoses and all orders for this visit:    Atherosclerosis of native arteries of extremities with rest pain, right leg (HCC)    Atherosclerosis of renal artery (HCC)    Aortoiliac stenosis, left (HCC)    Aortoiliac occlusive disease ([address])    Femoral artery stenosis, right (HCC) 50-75% stenosis in the common femoral artery. Symptomatic carotid artery stenosis, left  -     VAS carotid complete study; Future    Acute CVA (cerebrovascular accident) ([address])    Carotid stenosis, asymptomatic, right    Statin intolerance    Stage 4 chronic kidney disease ([address])    Right renal artery stenosis (HCC)    Mesenteric artery stenosis (HCC)          Subjective:      Patient ID: Uziel Crowder is a 70 y.o. female. Patient presents to review CV, AOIL, & TEA. Patient is s/p L TCAR 9/27/23 . HPI  Complain of right leg/foot pain with walking few feet. Mostly in wheelchair. Occasionally has pain in the foot without walking but no pain at night. She is able to have a good night sleep without any issue. The following portions of the patient's history were reviewed and updated as appropriate: allergies, current medications, past family history, past medical history, past social history, past surgical history, and problem list.    Review of Systems   Constitutional: Negative. HENT: Negative. Eyes: Negative. Respiratory: Negative. Cardiovascular: Negative. Gastrointestinal: Negative. Endocrine: Negative. Genitourinary: Negative. Musculoskeletal: Negative. Skin: Negative. Allergic/Immunologic: Negative. Neurological: Negative. Hematological: Negative. Psychiatric/Behavioral: Negative.       I have reviewed the review of systems as entered and made appropriate changes as necessary    Objective:      /78 (BP Location: Left arm, Patient Position: Sitting, Cuff Size: Standard)   Pulse 69   Ht 5' (1.524 m)   Wt 64.9 kg (143 lb)   LMP  (LMP Unknown)   BMI 27.93 kg/m²          Physical Exam  Vitals and nursing note reviewed. Constitutional:       Appearance: Normal appearance. Cardiovascular:      Rate and Rhythm: Normal rate and regular rhythm. Comments: Monophasic right peroneal signal    Biphasic left peroneal signal  Neurological:      Mental Status: She is alert.

## 2023-11-21 NOTE — ASSESSMENT & PLAN NOTE
High-grade asymptomatic right carotid artery stenosis  Recently had left TCAR for symptomatic carotid stenosis    Continue medical management of asymptomatic right side stenosis. In spite of previous known statin allergy patient is now able to tolerate Pravachol 80 mg/day.

## 2023-11-21 NOTE — ASSESSMENT & PLAN NOTE
Lab Results   Component Value Date    EGFR 27 11/02/2023    EGFR 26 09/09/2023    EGFR 29 09/08/2023    CREATININE 1.75 (H) 11/02/2023    CREATININE 1.82 (H) 09/09/2023    CREATININE 1.66 (H) 09/08/2023   High risk of progression into dialysis with any contrast based intervention or significant surgical bleeding.

## 2023-11-21 NOTE — ASSESSMENT & PLAN NOTE
82-year-old lady with chronic kidney disease stage IV, hypertension, hyperlipidemia with extensive cardiovascular history including recent stroke and left carotid stenting. Her right leg symptoms have worsened over the past few months in spite of being on maximal medical therapy. Looking at previous CT scans which are noncontrast I can see heavily calcified aortoiliac plaque which is nearly occlusive in the right common/external iliac segment. There is also high-grade stenosis of right common femoral with calcified plaque. The right SFA is occluded. Her LISA on the right side is reduced from 0.5-0. 29. Unfortunately she requires further work-up with CT angiogram which would involve administration of contrast.  In the past patient has had a decline in the renal function after contrast administration. She absolutely refuses dialysis. She has stated with a calm voice that in case she requires dialysis she would prefer to die. I will have further discussions with her nephrologist prior to deciding on next course of action. At this point as her symptoms are mainly short distance claudication and occasional rest pain with no tissue loss we can continue to watch this. I have given her and her son instructions on warning signs which would include any new wound or worsening rest pain. In that case we may have to proceed to the next which would be a CT angiogram with IV hydration and further steps based on that. Extensive discussion today discussing the risks and benefits of approaches, limited endovascular options are available due to the nature and location of the disease process. Complexity of endovascular and or hybrid approach such as femoral endarterectomy and its detrimental effect on renal function were discussed.     I have spent a total time of 60 minutes on 11/21/23 in caring for this patient including Diagnostic results, Risks and benefits of tx options, Counseling / Coordination of care, Reviewing / ordering tests, medicine, procedures  , Obtaining or reviewing history  , and Communicating with other healthcare professionals .

## 2023-11-21 NOTE — ASSESSMENT & PLAN NOTE
Significant improvement in symptoms, right hand symptoms/weakness have resolved, aphasia is nearly resolved. Patient underwent left TCAR for high-grade carotid stenosis currently on Brilinta.

## 2023-11-30 ENCOUNTER — TELEPHONE (OUTPATIENT)
Dept: INTERNAL MEDICINE CLINIC | Facility: CLINIC | Age: 78
End: 2023-11-30

## 2023-11-30 DIAGNOSIS — E11.65 TYPE 2 DIABETES MELLITUS WITH HYPERGLYCEMIA, WITH LONG-TERM CURRENT USE OF INSULIN (HCC): ICD-10-CM

## 2023-11-30 DIAGNOSIS — Z79.4 TYPE 2 DIABETES MELLITUS WITH HYPERGLYCEMIA, WITH LONG-TERM CURRENT USE OF INSULIN (HCC): ICD-10-CM

## 2023-11-30 RX ORDER — PEN NEEDLE, DIABETIC 31 GX5/16"
NEEDLE, DISPOSABLE MISCELLANEOUS 2 TIMES DAILY
Qty: 30 EACH | Refills: 1 | Status: SHIPPED | OUTPATIENT
Start: 2023-11-30

## 2023-11-30 NOTE — TELEPHONE ENCOUNTER
Insulin Pen Needle 31G X 8 MM-use two times a day    [facility]    Patient only has two left

## 2023-12-07 ENCOUNTER — 1 MONTH POST-OP (OUTPATIENT)
Dept: URBAN - METROPOLITAN AREA CLINIC 6 | Facility: CLINIC | Age: 78
End: 2023-12-07

## 2023-12-07 DIAGNOSIS — Z96.1: ICD-10-CM

## 2023-12-07 DIAGNOSIS — H25.811: ICD-10-CM

## 2023-12-07 DIAGNOSIS — Z79.4: ICD-10-CM

## 2023-12-07 DIAGNOSIS — E11.3212: ICD-10-CM

## 2023-12-07 DIAGNOSIS — H04.123: ICD-10-CM

## 2023-12-07 DIAGNOSIS — H26.492: ICD-10-CM

## 2023-12-07 PROCEDURE — 92134 CPTRZ OPH DX IMG PST SGM RTA: CPT

## 2023-12-07 PROCEDURE — 99024 POSTOP FOLLOW-UP VISIT: CPT

## 2023-12-07 PROCEDURE — 92015 DETERMINE REFRACTIVE STATE: CPT | Mod: NC

## 2023-12-07 ASSESSMENT — TONOMETRY
OS_IOP_MMHG: 14
OD_IOP_MMHG: 14

## 2023-12-07 ASSESSMENT — KERATOMETRY
OD_K2POWER_DIOPTERS: 45.25
OS_AXISANGLE_DEGREES: 13
OD_AXISANGLE_DEGREES: 174
OD_K1POWER_DIOPTERS: 43.75
OS_K2POWER_DIOPTERS: 45.25
OD_AXISANGLE2_DEGREES: 84
OS_K1POWER_DIOPTERS: 44.50
OS_AXISANGLE2_DEGREES: 103

## 2023-12-07 ASSESSMENT — VISUAL ACUITY
OD_CC: 20/60
OS_SC: 20/400

## 2023-12-17 DIAGNOSIS — I10 PRIMARY HYPERTENSION: ICD-10-CM

## 2023-12-17 DIAGNOSIS — F33.9 DEPRESSION, RECURRENT (HCC): ICD-10-CM

## 2023-12-18 RX ORDER — AMLODIPINE BESYLATE 5 MG/1
5 TABLET ORAL DAILY
Qty: 90 TABLET | Refills: 1 | Status: SHIPPED | OUTPATIENT
Start: 2023-12-18

## 2023-12-18 RX ORDER — TRAZODONE HYDROCHLORIDE 50 MG/1
50 TABLET ORAL
Qty: 90 TABLET | Refills: 1 | Status: SHIPPED | OUTPATIENT
Start: 2023-12-18

## 2023-12-30 DIAGNOSIS — I10 PRIMARY HYPERTENSION: ICD-10-CM

## 2024-01-01 ENCOUNTER — APPOINTMENT (EMERGENCY)
Dept: RADIOLOGY | Facility: HOSPITAL | Age: 79
DRG: 871 | End: 2024-01-01
Payer: COMMERCIAL

## 2024-01-01 ENCOUNTER — HOSPITAL ENCOUNTER (INPATIENT)
Facility: HOSPITAL | Age: 79
LOS: 6 days | DRG: 871 | End: 2024-08-20
Attending: EMERGENCY MEDICINE | Admitting: INTERNAL MEDICINE
Payer: COMMERCIAL

## 2024-01-01 ENCOUNTER — TRANSITIONAL CARE MANAGEMENT (OUTPATIENT)
Dept: INTERNAL MEDICINE CLINIC | Facility: CLINIC | Age: 79
End: 2024-01-01

## 2024-01-01 VITALS
OXYGEN SATURATION: 97 % | TEMPERATURE: 97 F | RESPIRATION RATE: 16 BRPM | WEIGHT: 124.34 LBS | BODY MASS INDEX: 25.07 KG/M2 | DIASTOLIC BLOOD PRESSURE: 58 MMHG | SYSTOLIC BLOOD PRESSURE: 111 MMHG | HEIGHT: 59 IN | HEART RATE: 95 BPM

## 2024-01-01 DIAGNOSIS — S91.309A OPEN WOUND OF FOOT: ICD-10-CM

## 2024-01-01 DIAGNOSIS — L97.511 DIABETIC ULCER OF TOE OF RIGHT FOOT ASSOCIATED WITH DIABETES MELLITUS DUE TO UNDERLYING CONDITION, LIMITED TO BREAKDOWN OF SKIN (HCC): ICD-10-CM

## 2024-01-01 DIAGNOSIS — I65.21 CAROTID STENOSIS, ASYMPTOMATIC, RIGHT: ICD-10-CM

## 2024-01-01 DIAGNOSIS — I12.9 CKD STAGE 4 SECONDARY TO HYPERTENSION (HCC): ICD-10-CM

## 2024-01-01 DIAGNOSIS — A41.9 SEVERE SEPSIS (HCC): ICD-10-CM

## 2024-01-01 DIAGNOSIS — K31.89 MASS OF STOMACH: ICD-10-CM

## 2024-01-01 DIAGNOSIS — E08.621 DIABETIC ULCER OF TOE OF RIGHT FOOT ASSOCIATED WITH DIABETES MELLITUS DUE TO UNDERLYING CONDITION, LIMITED TO BREAKDOWN OF SKIN (HCC): ICD-10-CM

## 2024-01-01 DIAGNOSIS — T14.8XXA WOUND DRAINAGE: ICD-10-CM

## 2024-01-01 DIAGNOSIS — N18.4 CKD STAGE 4 SECONDARY TO HYPERTENSION (HCC): ICD-10-CM

## 2024-01-01 DIAGNOSIS — I50.22 CHRONIC HFREF (HEART FAILURE WITH REDUCED EJECTION FRACTION) (HCC): ICD-10-CM

## 2024-01-01 DIAGNOSIS — J18.9 PNEUMONIA: ICD-10-CM

## 2024-01-01 DIAGNOSIS — I73.9 CLAUDICATION IN PERIPHERAL VASCULAR DISEASE (HCC): Primary | ICD-10-CM

## 2024-01-01 DIAGNOSIS — I74.09 AORTOILIAC OCCLUSIVE DISEASE (HCC): ICD-10-CM

## 2024-01-01 DIAGNOSIS — E87.0 HYPERNATREMIA: ICD-10-CM

## 2024-01-01 DIAGNOSIS — R65.20 SEVERE SEPSIS (HCC): ICD-10-CM

## 2024-01-01 DIAGNOSIS — C49.A0 GASTROINTESTINAL STROMAL TUMOR (GIST) (HCC): ICD-10-CM

## 2024-01-01 DIAGNOSIS — G93.41 ACUTE METABOLIC ENCEPHALOPATHY: ICD-10-CM

## 2024-01-01 DIAGNOSIS — Z51.5 HOSPICE CARE: ICD-10-CM

## 2024-01-01 LAB
2HR DELTA HS TROPONIN: -69 NG/L
4HR DELTA HS TROPONIN: -9 NG/L
ALBUMIN SERPL BCG-MCNC: 2.6 G/DL (ref 3.5–5)
ALBUMIN SERPL BCG-MCNC: 2.9 G/DL (ref 3.5–5)
ALP SERPL-CCNC: 86 U/L (ref 34–104)
ALP SERPL-CCNC: 94 U/L (ref 34–104)
ALT SERPL W P-5'-P-CCNC: 10 U/L (ref 7–52)
ALT SERPL W P-5'-P-CCNC: 9 U/L (ref 7–52)
AMMONIA PLAS-SCNC: 18 UMOL/L (ref 18–72)
ANION GAP SERPL CALCULATED.3IONS-SCNC: 11 MMOL/L (ref 4–13)
ANION GAP SERPL CALCULATED.3IONS-SCNC: 12 MMOL/L (ref 4–13)
ANION GAP SERPL CALCULATED.3IONS-SCNC: 13 MMOL/L (ref 4–13)
ANION GAP SERPL CALCULATED.3IONS-SCNC: 14 MMOL/L (ref 4–13)
ANION GAP SERPL CALCULATED.3IONS-SCNC: 9 MMOL/L (ref 4–13)
ANISOCYTOSIS BLD QL SMEAR: PRESENT
APTT PPP: 30 SECONDS (ref 23–34)
APTT PPP: 31 SECONDS (ref 23–34)
AST SERPL W P-5'-P-CCNC: 13 U/L (ref 13–39)
AST SERPL W P-5'-P-CCNC: 16 U/L (ref 13–39)
ATRIAL RATE: 70 BPM
BACTERIA BLD CULT: NORMAL
BACTERIA BLD CULT: NORMAL
BACTERIA UR QL AUTO: NORMAL /HPF
BASOPHILS # BLD AUTO: 0.01 THOUSANDS/ÂΜL (ref 0–0.1)
BASOPHILS # BLD AUTO: 0.02 THOUSANDS/ÂΜL (ref 0–0.1)
BASOPHILS # BLD MANUAL: 0 THOUSAND/UL (ref 0–0.1)
BASOPHILS NFR BLD AUTO: 0 % (ref 0–1)
BASOPHILS NFR BLD AUTO: 0 % (ref 0–1)
BASOPHILS NFR MAR MANUAL: 0 % (ref 0–1)
BILIRUB SERPL-MCNC: 1.65 MG/DL (ref 0.2–1)
BILIRUB SERPL-MCNC: 1.69 MG/DL (ref 0.2–1)
BILIRUB UR QL STRIP: NEGATIVE
BUN SERPL-MCNC: 42 MG/DL (ref 5–25)
BUN SERPL-MCNC: 47 MG/DL (ref 5–25)
BUN SERPL-MCNC: 47 MG/DL (ref 5–25)
BUN SERPL-MCNC: 48 MG/DL (ref 5–25)
BUN SERPL-MCNC: 51 MG/DL (ref 5–25)
CALCIUM ALBUM COR SERPL-MCNC: 8.6 MG/DL (ref 8.3–10.1)
CALCIUM ALBUM COR SERPL-MCNC: 8.7 MG/DL (ref 8.3–10.1)
CALCIUM SERPL-MCNC: 7.5 MG/DL (ref 8.4–10.2)
CALCIUM SERPL-MCNC: 7.7 MG/DL (ref 8.4–10.2)
CALCIUM SERPL-MCNC: 7.8 MG/DL (ref 8.4–10.2)
CALCIUM SERPL-MCNC: 7.8 MG/DL (ref 8.4–10.2)
CALCIUM SERPL-MCNC: 8 MG/DL (ref 8.4–10.2)
CARDIAC TROPONIN I PNL SERPL HS: 149 NG/L
CARDIAC TROPONIN I PNL SERPL HS: 209 NG/L
CARDIAC TROPONIN I PNL SERPL HS: 218 NG/L
CHLORIDE SERPL-SCNC: 101 MMOL/L (ref 96–108)
CHLORIDE SERPL-SCNC: 103 MMOL/L (ref 96–108)
CHLORIDE SERPL-SCNC: 104 MMOL/L (ref 96–108)
CHLORIDE SERPL-SCNC: 104 MMOL/L (ref 96–108)
CHLORIDE SERPL-SCNC: 108 MMOL/L (ref 96–108)
CLARITY UR: CLEAR
CO2 SERPL-SCNC: 28 MMOL/L (ref 21–32)
CO2 SERPL-SCNC: 30 MMOL/L (ref 21–32)
CO2 SERPL-SCNC: 31 MMOL/L (ref 21–32)
CO2 SERPL-SCNC: 33 MMOL/L (ref 21–32)
CO2 SERPL-SCNC: 34 MMOL/L (ref 21–32)
COLOR UR: ABNORMAL
CREAT SERPL-MCNC: 1.49 MG/DL (ref 0.6–1.3)
CREAT SERPL-MCNC: 1.51 MG/DL (ref 0.6–1.3)
CREAT SERPL-MCNC: 1.59 MG/DL (ref 0.6–1.3)
CREAT SERPL-MCNC: 1.73 MG/DL (ref 0.6–1.3)
CREAT SERPL-MCNC: 1.79 MG/DL (ref 0.6–1.3)
EOSINOPHIL # BLD AUTO: 0.06 THOUSAND/ÂΜL (ref 0–0.61)
EOSINOPHIL # BLD AUTO: 0.09 THOUSAND/ÂΜL (ref 0–0.61)
EOSINOPHIL # BLD MANUAL: 0 THOUSAND/UL (ref 0–0.4)
EOSINOPHIL NFR BLD AUTO: 0 % (ref 0–6)
EOSINOPHIL NFR BLD AUTO: 1 % (ref 0–6)
EOSINOPHIL NFR BLD MANUAL: 0 % (ref 0–6)
ERYTHROCYTE [DISTWIDTH] IN BLOOD BY AUTOMATED COUNT: 20.1 % (ref 11.6–15.1)
ERYTHROCYTE [DISTWIDTH] IN BLOOD BY AUTOMATED COUNT: 20.2 % (ref 11.6–15.1)
ERYTHROCYTE [DISTWIDTH] IN BLOOD BY AUTOMATED COUNT: 21.2 % (ref 11.6–15.1)
ERYTHROCYTE [DISTWIDTH] IN BLOOD BY AUTOMATED COUNT: 21.7 % (ref 11.6–15.1)
ERYTHROCYTE [DISTWIDTH] IN BLOOD BY AUTOMATED COUNT: 22.1 % (ref 11.6–15.1)
GFR SERPL CREATININE-BSD FRML MDRD: 26 ML/MIN/1.73SQ M
GFR SERPL CREATININE-BSD FRML MDRD: 28 ML/MIN/1.73SQ M
GFR SERPL CREATININE-BSD FRML MDRD: 31 ML/MIN/1.73SQ M
GFR SERPL CREATININE-BSD FRML MDRD: 33 ML/MIN/1.73SQ M
GFR SERPL CREATININE-BSD FRML MDRD: 33 ML/MIN/1.73SQ M
GLUCOSE SERPL-MCNC: 151 MG/DL (ref 65–140)
GLUCOSE SERPL-MCNC: 162 MG/DL (ref 65–140)
GLUCOSE SERPL-MCNC: 164 MG/DL (ref 65–140)
GLUCOSE SERPL-MCNC: 165 MG/DL (ref 65–140)
GLUCOSE SERPL-MCNC: 167 MG/DL (ref 65–140)
GLUCOSE SERPL-MCNC: 173 MG/DL (ref 65–140)
GLUCOSE SERPL-MCNC: 176 MG/DL (ref 65–140)
GLUCOSE SERPL-MCNC: 193 MG/DL (ref 65–140)
GLUCOSE SERPL-MCNC: 194 MG/DL (ref 65–140)
GLUCOSE SERPL-MCNC: 198 MG/DL (ref 65–140)
GLUCOSE SERPL-MCNC: 202 MG/DL (ref 65–140)
GLUCOSE SERPL-MCNC: 214 MG/DL (ref 65–140)
GLUCOSE SERPL-MCNC: 215 MG/DL (ref 65–140)
GLUCOSE SERPL-MCNC: 221 MG/DL (ref 65–140)
GLUCOSE SERPL-MCNC: 222 MG/DL (ref 65–140)
GLUCOSE SERPL-MCNC: 222 MG/DL (ref 65–140)
GLUCOSE SERPL-MCNC: 223 MG/DL (ref 65–140)
GLUCOSE SERPL-MCNC: 231 MG/DL (ref 65–140)
GLUCOSE SERPL-MCNC: 232 MG/DL (ref 65–140)
GLUCOSE SERPL-MCNC: 233 MG/DL (ref 65–140)
GLUCOSE SERPL-MCNC: 234 MG/DL (ref 65–140)
GLUCOSE SERPL-MCNC: 236 MG/DL (ref 65–140)
GLUCOSE SERPL-MCNC: 243 MG/DL (ref 65–140)
GLUCOSE SERPL-MCNC: 250 MG/DL (ref 65–140)
GLUCOSE SERPL-MCNC: 253 MG/DL (ref 65–140)
GLUCOSE SERPL-MCNC: 258 MG/DL (ref 65–140)
GLUCOSE UR STRIP-MCNC: NEGATIVE MG/DL
HCT VFR BLD AUTO: 25.1 % (ref 34.8–46.1)
HCT VFR BLD AUTO: 25.6 % (ref 34.8–46.1)
HCT VFR BLD AUTO: 26.9 % (ref 34.8–46.1)
HCT VFR BLD AUTO: 27.2 % (ref 34.8–46.1)
HCT VFR BLD AUTO: 30 % (ref 34.8–46.1)
HGB BLD-MCNC: 7.1 G/DL (ref 11.5–15.4)
HGB BLD-MCNC: 7.2 G/DL (ref 11.5–15.4)
HGB BLD-MCNC: 7.3 G/DL (ref 11.5–15.4)
HGB BLD-MCNC: 7.6 G/DL (ref 11.5–15.4)
HGB BLD-MCNC: 8.4 G/DL (ref 11.5–15.4)
HGB UR QL STRIP.AUTO: NEGATIVE
HYPERCHROMIA BLD QL SMEAR: PRESENT
IMM GRANULOCYTES # BLD AUTO: 0.23 THOUSAND/UL (ref 0–0.2)
IMM GRANULOCYTES # BLD AUTO: 0.35 THOUSAND/UL (ref 0–0.2)
IMM GRANULOCYTES NFR BLD AUTO: 1 % (ref 0–2)
IMM GRANULOCYTES NFR BLD AUTO: 2 % (ref 0–2)
INR PPP: 2.21 (ref 0.85–1.19)
INR PPP: 2.56 (ref 0.85–1.19)
KETONES UR STRIP-MCNC: NEGATIVE MG/DL
LACTATE SERPL-SCNC: 2.1 MMOL/L (ref 0.5–2)
LACTATE SERPL-SCNC: 2.1 MMOL/L (ref 0.5–2)
LEUKOCYTE ESTERASE UR QL STRIP: NEGATIVE
LYMPHOCYTES # BLD AUTO: 0.25 THOUSAND/UL (ref 0.6–4.47)
LYMPHOCYTES # BLD AUTO: 0.83 THOUSANDS/ÂΜL (ref 0.6–4.47)
LYMPHOCYTES # BLD AUTO: 0.97 THOUSANDS/ÂΜL (ref 0.6–4.47)
LYMPHOCYTES # BLD AUTO: 1 % (ref 14–44)
LYMPHOCYTES NFR BLD AUTO: 4 % (ref 14–44)
LYMPHOCYTES NFR BLD AUTO: 6 % (ref 14–44)
MACROCYTES BLD QL AUTO: PRESENT
MAGNESIUM SERPL-MCNC: 2 MG/DL (ref 1.9–2.7)
MAGNESIUM SERPL-MCNC: 2 MG/DL (ref 1.9–2.7)
MAGNESIUM SERPL-MCNC: 2.1 MG/DL (ref 1.9–2.7)
MCH RBC QN AUTO: 27.5 PG (ref 26.8–34.3)
MCH RBC QN AUTO: 27.8 PG (ref 26.8–34.3)
MCH RBC QN AUTO: 28 PG (ref 26.8–34.3)
MCH RBC QN AUTO: 28.6 PG (ref 26.8–34.3)
MCH RBC QN AUTO: 28.8 PG (ref 26.8–34.3)
MCHC RBC AUTO-ENTMCNC: 27.1 G/DL (ref 31.4–37.4)
MCHC RBC AUTO-ENTMCNC: 27.9 G/DL (ref 31.4–37.4)
MCHC RBC AUTO-ENTMCNC: 28 G/DL (ref 31.4–37.4)
MCHC RBC AUTO-ENTMCNC: 28.1 G/DL (ref 31.4–37.4)
MCHC RBC AUTO-ENTMCNC: 28.3 G/DL (ref 31.4–37.4)
MCV RBC AUTO: 100 FL (ref 82–98)
MCV RBC AUTO: 101 FL (ref 82–98)
MCV RBC AUTO: 102 FL (ref 82–98)
MCV RBC AUTO: 103 FL (ref 82–98)
MCV RBC AUTO: 99 FL (ref 82–98)
MONOCYTES # BLD AUTO: 0.25 THOUSAND/UL (ref 0–1.22)
MONOCYTES # BLD AUTO: 0.72 THOUSAND/ÂΜL (ref 0.17–1.22)
MONOCYTES # BLD AUTO: 1.13 THOUSAND/ÂΜL (ref 0.17–1.22)
MONOCYTES NFR BLD AUTO: 4 % (ref 4–12)
MONOCYTES NFR BLD AUTO: 6 % (ref 4–12)
MONOCYTES NFR BLD: 1 % (ref 4–12)
MRSA NOSE QL CULT: NORMAL
NEUTROPHILS # BLD AUTO: 14.46 THOUSANDS/ÂΜL (ref 1.85–7.62)
NEUTROPHILS # BLD AUTO: 18.11 THOUSANDS/ÂΜL (ref 1.85–7.62)
NEUTROPHILS # BLD MANUAL: 24.95 THOUSAND/UL (ref 1.85–7.62)
NEUTS SEG NFR BLD AUTO: 88 % (ref 43–75)
NEUTS SEG NFR BLD AUTO: 88 % (ref 43–75)
NEUTS SEG NFR BLD AUTO: 98 % (ref 43–75)
NITRITE UR QL STRIP: NEGATIVE
NON-SQ EPI CELLS URNS QL MICRO: NORMAL /HPF
NRBC BLD AUTO-RTO: 1 /100 WBCS
NRBC BLD AUTO-RTO: 1 /100 WBCS
P AXIS: -42 DEGREES
PH UR STRIP.AUTO: 6 [PH]
PHOSPHATE SERPL-MCNC: 3.6 MG/DL (ref 2.3–4.1)
PLATELET # BLD AUTO: 213 THOUSANDS/UL (ref 149–390)
PLATELET # BLD AUTO: 219 THOUSANDS/UL (ref 149–390)
PLATELET # BLD AUTO: 221 THOUSANDS/UL (ref 149–390)
PLATELET # BLD AUTO: 228 THOUSANDS/UL (ref 149–390)
PLATELET # BLD AUTO: 242 THOUSANDS/UL (ref 149–390)
PLATELET BLD QL SMEAR: ADEQUATE
PMV BLD AUTO: 11.4 FL (ref 8.9–12.7)
PMV BLD AUTO: 11.6 FL (ref 8.9–12.7)
PMV BLD AUTO: 11.6 FL (ref 8.9–12.7)
PMV BLD AUTO: 11.7 FL (ref 8.9–12.7)
PMV BLD AUTO: 11.8 FL (ref 8.9–12.7)
POIKILOCYTOSIS BLD QL SMEAR: PRESENT
POLYCHROMASIA BLD QL SMEAR: PRESENT
POTASSIUM SERPL-SCNC: 3.1 MMOL/L (ref 3.5–5.3)
POTASSIUM SERPL-SCNC: 3.2 MMOL/L (ref 3.5–5.3)
POTASSIUM SERPL-SCNC: 3.2 MMOL/L (ref 3.5–5.3)
POTASSIUM SERPL-SCNC: 3.3 MMOL/L (ref 3.5–5.3)
POTASSIUM SERPL-SCNC: 3.6 MMOL/L (ref 3.5–5.3)
PR INTERVAL: 170 MS
PROCALCITONIN SERPL-MCNC: 0.43 NG/ML
PROCALCITONIN SERPL-MCNC: 0.52 NG/ML
PROCALCITONIN SERPL-MCNC: 0.59 NG/ML
PROT SERPL-MCNC: 5 G/DL (ref 6.4–8.4)
PROT SERPL-MCNC: 5.4 G/DL (ref 6.4–8.4)
PROT UR STRIP-MCNC: ABNORMAL MG/DL
PROTHROMBIN TIME: 24.5 SECONDS (ref 12.3–15)
PROTHROMBIN TIME: 27.4 SECONDS (ref 12.3–15)
QRS AXIS: -54 DEGREES
QRSD INTERVAL: 164 MS
QT INTERVAL: 500 MS
QTC INTERVAL: 540 MS
RBC # BLD AUTO: 2.48 MILLION/UL (ref 3.81–5.12)
RBC # BLD AUTO: 2.57 MILLION/UL (ref 3.81–5.12)
RBC # BLD AUTO: 2.63 MILLION/UL (ref 3.81–5.12)
RBC # BLD AUTO: 2.76 MILLION/UL (ref 3.81–5.12)
RBC # BLD AUTO: 2.92 MILLION/UL (ref 3.81–5.12)
RBC #/AREA URNS AUTO: NORMAL /HPF
RBC MORPH BLD: PRESENT
SODIUM SERPL-SCNC: 142 MMOL/L (ref 135–147)
SODIUM SERPL-SCNC: 145 MMOL/L (ref 135–147)
SODIUM SERPL-SCNC: 147 MMOL/L (ref 135–147)
SODIUM SERPL-SCNC: 148 MMOL/L (ref 135–147)
SODIUM SERPL-SCNC: 153 MMOL/L (ref 135–147)
SP GR UR STRIP.AUTO: 1.04 (ref 1–1.03)
T WAVE AXIS: 142 DEGREES
UROBILINOGEN UR STRIP-ACNC: <2 MG/DL
VENTRICULAR RATE: 70 BPM
WBC # BLD AUTO: 15.81 THOUSAND/UL (ref 4.31–10.16)
WBC # BLD AUTO: 16.48 THOUSAND/UL (ref 4.31–10.16)
WBC # BLD AUTO: 20.5 THOUSAND/UL (ref 4.31–10.16)
WBC # BLD AUTO: 20.72 THOUSAND/UL (ref 4.31–10.16)
WBC # BLD AUTO: 25.46 THOUSAND/UL (ref 4.31–10.16)
WBC #/AREA URNS AUTO: NORMAL /HPF

## 2024-01-01 PROCEDURE — 85027 COMPLETE CBC AUTOMATED: CPT | Performed by: PHYSICIAN ASSISTANT

## 2024-01-01 PROCEDURE — 80048 BASIC METABOLIC PNL TOTAL CA: CPT | Performed by: PHYSICIAN ASSISTANT

## 2024-01-01 PROCEDURE — 99233 SBSQ HOSP IP/OBS HIGH 50: CPT | Performed by: PHYSICIAN ASSISTANT

## 2024-01-01 PROCEDURE — 80053 COMPREHEN METABOLIC PANEL: CPT | Performed by: INTERNAL MEDICINE

## 2024-01-01 PROCEDURE — 99024 POSTOP FOLLOW-UP VISIT: CPT | Performed by: PODIATRIST

## 2024-01-01 PROCEDURE — 93005 ELECTROCARDIOGRAM TRACING: CPT

## 2024-01-01 PROCEDURE — 99233 SBSQ HOSP IP/OBS HIGH 50: CPT | Performed by: STUDENT IN AN ORGANIZED HEALTH CARE EDUCATION/TRAINING PROGRAM

## 2024-01-01 PROCEDURE — 82948 REAGENT STRIP/BLOOD GLUCOSE: CPT

## 2024-01-01 PROCEDURE — 84484 ASSAY OF TROPONIN QUANT: CPT

## 2024-01-01 PROCEDURE — 99232 SBSQ HOSP IP/OBS MODERATE 35: CPT | Performed by: PHYSICIAN ASSISTANT

## 2024-01-01 PROCEDURE — 99232 SBSQ HOSP IP/OBS MODERATE 35: CPT | Performed by: STUDENT IN AN ORGANIZED HEALTH CARE EDUCATION/TRAINING PROGRAM

## 2024-01-01 PROCEDURE — 85007 BL SMEAR W/DIFF WBC COUNT: CPT | Performed by: STUDENT IN AN ORGANIZED HEALTH CARE EDUCATION/TRAINING PROGRAM

## 2024-01-01 PROCEDURE — 92610 EVALUATE SWALLOWING FUNCTION: CPT

## 2024-01-01 PROCEDURE — 71045 X-RAY EXAM CHEST 1 VIEW: CPT

## 2024-01-01 PROCEDURE — 85610 PROTHROMBIN TIME: CPT | Performed by: INTERNAL MEDICINE

## 2024-01-01 PROCEDURE — 83735 ASSAY OF MAGNESIUM: CPT | Performed by: PHYSICIAN ASSISTANT

## 2024-01-01 PROCEDURE — 99223 1ST HOSP IP/OBS HIGH 75: CPT | Performed by: STUDENT IN AN ORGANIZED HEALTH CARE EDUCATION/TRAINING PROGRAM

## 2024-01-01 PROCEDURE — 87081 CULTURE SCREEN ONLY: CPT | Performed by: INTERNAL MEDICINE

## 2024-01-01 PROCEDURE — 83735 ASSAY OF MAGNESIUM: CPT

## 2024-01-01 PROCEDURE — 96368 THER/DIAG CONCURRENT INF: CPT

## 2024-01-01 PROCEDURE — 85730 THROMBOPLASTIN TIME PARTIAL: CPT

## 2024-01-01 PROCEDURE — RECHECK: Performed by: PHYSICIAN ASSISTANT

## 2024-01-01 PROCEDURE — 85027 COMPLETE CBC AUTOMATED: CPT | Performed by: STUDENT IN AN ORGANIZED HEALTH CARE EDUCATION/TRAINING PROGRAM

## 2024-01-01 PROCEDURE — 99238 HOSP IP/OBS DSCHRG MGMT 30/<: CPT | Performed by: PHYSICIAN ASSISTANT

## 2024-01-01 PROCEDURE — 85610 PROTHROMBIN TIME: CPT

## 2024-01-01 PROCEDURE — 81001 URINALYSIS AUTO W/SCOPE: CPT

## 2024-01-01 PROCEDURE — 99232 SBSQ HOSP IP/OBS MODERATE 35: CPT | Performed by: INTERNAL MEDICINE

## 2024-01-01 PROCEDURE — 74174 CTA ABD&PLVS W/CONTRAST: CPT

## 2024-01-01 PROCEDURE — NC001 PR NO CHARGE: Performed by: PODIATRIST

## 2024-01-01 PROCEDURE — 84145 PROCALCITONIN (PCT): CPT | Performed by: PHYSICIAN ASSISTANT

## 2024-01-01 PROCEDURE — 84145 PROCALCITONIN (PCT): CPT

## 2024-01-01 PROCEDURE — 87040 BLOOD CULTURE FOR BACTERIA: CPT

## 2024-01-01 PROCEDURE — NC001 PR NO CHARGE: Performed by: SURGERY

## 2024-01-01 PROCEDURE — 82140 ASSAY OF AMMONIA: CPT

## 2024-01-01 PROCEDURE — 99285 EMERGENCY DEPT VISIT HI MDM: CPT | Performed by: EMERGENCY MEDICINE

## 2024-01-01 PROCEDURE — 85025 COMPLETE CBC W/AUTO DIFF WBC: CPT

## 2024-01-01 PROCEDURE — 80053 COMPREHEN METABOLIC PANEL: CPT

## 2024-01-01 PROCEDURE — 93010 ELECTROCARDIOGRAM REPORT: CPT | Performed by: INTERNAL MEDICINE

## 2024-01-01 PROCEDURE — 83735 ASSAY OF MAGNESIUM: CPT | Performed by: INTERNAL MEDICINE

## 2024-01-01 PROCEDURE — 99024 POSTOP FOLLOW-UP VISIT: CPT | Performed by: SURGERY

## 2024-01-01 PROCEDURE — 70450 CT HEAD/BRAIN W/O DYE: CPT

## 2024-01-01 PROCEDURE — 96365 THER/PROPH/DIAG IV INF INIT: CPT

## 2024-01-01 PROCEDURE — 99285 EMERGENCY DEPT VISIT HI MDM: CPT

## 2024-01-01 PROCEDURE — 99223 1ST HOSP IP/OBS HIGH 75: CPT | Performed by: INTERNAL MEDICINE

## 2024-01-01 PROCEDURE — 85025 COMPLETE CBC W/AUTO DIFF WBC: CPT | Performed by: INTERNAL MEDICINE

## 2024-01-01 PROCEDURE — 71275 CT ANGIOGRAPHY CHEST: CPT

## 2024-01-01 PROCEDURE — 83605 ASSAY OF LACTIC ACID: CPT

## 2024-01-01 PROCEDURE — 92526 ORAL FUNCTION THERAPY: CPT

## 2024-01-01 PROCEDURE — 80048 BASIC METABOLIC PNL TOTAL CA: CPT | Performed by: STUDENT IN AN ORGANIZED HEALTH CARE EDUCATION/TRAINING PROGRAM

## 2024-01-01 PROCEDURE — 36415 COLL VENOUS BLD VENIPUNCTURE: CPT | Performed by: INTERNAL MEDICINE

## 2024-01-01 PROCEDURE — 84100 ASSAY OF PHOSPHORUS: CPT

## 2024-01-01 PROCEDURE — 85730 THROMBOPLASTIN TIME PARTIAL: CPT | Performed by: INTERNAL MEDICINE

## 2024-01-01 PROCEDURE — 36415 COLL VENOUS BLD VENIPUNCTURE: CPT

## 2024-01-01 RX ORDER — INSULIN LISPRO 100 [IU]/ML
1-5 INJECTION, SOLUTION INTRAVENOUS; SUBCUTANEOUS
Status: DISCONTINUED | OUTPATIENT
Start: 2024-01-01 | End: 2024-01-01 | Stop reason: HOSPADM

## 2024-01-01 RX ORDER — ESCITALOPRAM OXALATE 20 MG/1
20 TABLET ORAL DAILY
Status: DISCONTINUED | OUTPATIENT
Start: 2024-01-01 | End: 2024-01-01 | Stop reason: HOSPADM

## 2024-01-01 RX ORDER — DEXTROSE MONOHYDRATE 50 MG/ML
50 INJECTION, SOLUTION INTRAVENOUS CONTINUOUS
Status: DISCONTINUED | OUTPATIENT
Start: 2024-01-01 | End: 2024-01-01 | Stop reason: HOSPADM

## 2024-01-01 RX ORDER — POTASSIUM CHLORIDE 14.9 MG/ML
20 INJECTION INTRAVENOUS
Status: COMPLETED | OUTPATIENT
Start: 2024-01-01 | End: 2024-01-01

## 2024-01-01 RX ORDER — INSULIN LISPRO 100 [IU]/ML
1-5 INJECTION, SOLUTION INTRAVENOUS; SUBCUTANEOUS
Status: DISCONTINUED | OUTPATIENT
Start: 2024-01-01 | End: 2024-01-01

## 2024-01-01 RX ORDER — ALLOPURINOL 100 MG/1
100 TABLET ORAL DAILY
Status: DISCONTINUED | OUTPATIENT
Start: 2024-01-01 | End: 2024-01-01 | Stop reason: HOSPADM

## 2024-01-01 RX ORDER — PANTOPRAZOLE SODIUM 40 MG/1
40 TABLET, DELAYED RELEASE ORAL 2 TIMES DAILY
Status: DISCONTINUED | OUTPATIENT
Start: 2024-01-01 | End: 2024-01-01 | Stop reason: HOSPADM

## 2024-01-01 RX ORDER — LIDOCAINE 50 MG/G
1 PATCH TOPICAL DAILY
Status: DISCONTINUED | OUTPATIENT
Start: 2024-01-01 | End: 2024-01-01 | Stop reason: HOSPADM

## 2024-01-01 RX ORDER — SODIUM CHLORIDE, SODIUM GLUCONATE, SODIUM ACETATE, POTASSIUM CHLORIDE, MAGNESIUM CHLORIDE, SODIUM PHOSPHATE, DIBASIC, AND POTASSIUM PHOSPHATE .53; .5; .37; .037; .03; .012; .00082 G/100ML; G/100ML; G/100ML; G/100ML; G/100ML; G/100ML; G/100ML
500 INJECTION, SOLUTION INTRAVENOUS ONCE
Status: COMPLETED | OUTPATIENT
Start: 2024-01-01 | End: 2024-01-01

## 2024-01-01 RX ORDER — DEXTROSE MONOHYDRATE 50 MG/ML
50 INJECTION, SOLUTION INTRAVENOUS CONTINUOUS
Status: DISCONTINUED | OUTPATIENT
Start: 2024-01-01 | End: 2024-01-01

## 2024-01-01 RX ORDER — HYDROMORPHONE HCL IN WATER/PF 6 MG/30 ML
0.2 PATIENT CONTROLLED ANALGESIA SYRINGE INTRAVENOUS ONCE
Status: COMPLETED | OUTPATIENT
Start: 2024-01-01 | End: 2024-01-01

## 2024-01-01 RX ORDER — OXYCODONE HCL 20 MG/ML
6 CONCENTRATE, ORAL ORAL EVERY 2 HOUR PRN
Qty: 15 ML | Refills: 0 | Status: SHIPPED | OUTPATIENT
Start: 2024-01-01 | End: 2024-08-21

## 2024-01-01 RX ORDER — LORAZEPAM 2 MG/ML
0.5 CONCENTRATE ORAL EVERY 4 HOURS PRN
Qty: 30 ML | Refills: 0 | Status: SHIPPED | OUTPATIENT
Start: 2024-01-01 | End: 2024-08-21

## 2024-01-01 RX ORDER — SODIUM BICARBONATE 650 MG/1
650 TABLET ORAL
Status: DISCONTINUED | OUTPATIENT
Start: 2024-01-01 | End: 2024-01-01

## 2024-01-01 RX ORDER — METOPROLOL SUCCINATE 25 MG/1
25 TABLET, EXTENDED RELEASE ORAL DAILY
Status: DISCONTINUED | OUTPATIENT
Start: 2024-01-01 | End: 2024-01-01 | Stop reason: HOSPADM

## 2024-01-01 RX ORDER — LORAZEPAM 2 MG/ML
0.2 INJECTION INTRAMUSCULAR ONCE
Status: DISCONTINUED | OUTPATIENT
Start: 2024-01-01 | End: 2024-01-01

## 2024-01-01 RX ORDER — POTASSIUM CHLORIDE 14.9 MG/ML
20 INJECTION INTRAVENOUS ONCE
Status: COMPLETED | OUTPATIENT
Start: 2024-01-01 | End: 2024-01-01

## 2024-01-01 RX ORDER — HALOPERIDOL 2 MG/ML
0.5 SOLUTION ORAL EVERY 4 HOURS PRN
Qty: 30 ML | Refills: 0 | Status: SHIPPED | OUTPATIENT
Start: 2024-01-01 | End: 2024-08-21

## 2024-01-01 RX ORDER — HYDROMORPHONE HCL IN WATER/PF 6 MG/30 ML
0.2 PATIENT CONTROLLED ANALGESIA SYRINGE INTRAVENOUS EVERY 4 HOURS PRN
Status: DISCONTINUED | OUTPATIENT
Start: 2024-01-01 | End: 2024-01-01 | Stop reason: HOSPADM

## 2024-01-01 RX ORDER — ACETAMINOPHEN 325 MG/1
650 TABLET ORAL EVERY 6 HOURS PRN
Status: DISCONTINUED | OUTPATIENT
Start: 2024-01-01 | End: 2024-01-01 | Stop reason: HOSPADM

## 2024-01-01 RX ORDER — INSULIN LISPRO 100 [IU]/ML
1-5 INJECTION, SOLUTION INTRAVENOUS; SUBCUTANEOUS EVERY 6 HOURS SCHEDULED
Status: DISCONTINUED | OUTPATIENT
Start: 2024-01-01 | End: 2024-01-01

## 2024-01-01 RX ORDER — PRAVASTATIN SODIUM 80 MG/1
80 TABLET ORAL
Status: DISCONTINUED | OUTPATIENT
Start: 2024-01-01 | End: 2024-01-01 | Stop reason: HOSPADM

## 2024-01-01 RX ADMIN — PIPERACILLIN SODIUM AND TAZOBACTAM SODIUM 4.5 G: 36; 4.5 INJECTION, POWDER, LYOPHILIZED, FOR SOLUTION INTRAVENOUS at 04:45

## 2024-01-01 RX ADMIN — INSULIN LISPRO 2 UNITS: 100 INJECTION, SOLUTION INTRAVENOUS; SUBCUTANEOUS at 22:26

## 2024-01-01 RX ADMIN — SODIUM CHLORIDE, SODIUM GLUCONATE, SODIUM ACETATE, POTASSIUM CHLORIDE, MAGNESIUM CHLORIDE, SODIUM PHOSPHATE, DIBASIC, AND POTASSIUM PHOSPHATE 500 ML: .53; .5; .37; .037; .03; .012; .00082 INJECTION, SOLUTION INTRAVENOUS at 21:32

## 2024-01-01 RX ADMIN — ALLOPURINOL 100 MG: 100 TABLET ORAL at 11:30

## 2024-01-01 RX ADMIN — HYDROMORPHONE HYDROCHLORIDE 0.2 MG: 0.2 INJECTION, SOLUTION INTRAMUSCULAR; INTRAVENOUS; SUBCUTANEOUS at 06:27

## 2024-01-01 RX ADMIN — INSULIN LISPRO 1 UNITS: 100 INJECTION, SOLUTION INTRAVENOUS; SUBCUTANEOUS at 00:29

## 2024-01-01 RX ADMIN — DEXTROSE 80 ML/HR: 5 SOLUTION INTRAVENOUS at 11:07

## 2024-01-01 RX ADMIN — PRAVASTATIN SODIUM 80 MG: 80 TABLET ORAL at 17:18

## 2024-01-01 RX ADMIN — TICAGRELOR 90 MG: 90 TABLET ORAL at 22:20

## 2024-01-01 RX ADMIN — INSULIN LISPRO 1 UNITS: 100 INJECTION, SOLUTION INTRAVENOUS; SUBCUTANEOUS at 23:01

## 2024-01-01 RX ADMIN — METOPROLOL SUCCINATE 25 MG: 25 TABLET, EXTENDED RELEASE ORAL at 11:30

## 2024-01-01 RX ADMIN — APIXABAN 5 MG: 5 TABLET, FILM COATED ORAL at 17:37

## 2024-01-01 RX ADMIN — METOPROLOL SUCCINATE 25 MG: 25 TABLET, EXTENDED RELEASE ORAL at 09:57

## 2024-01-01 RX ADMIN — HYDROMORPHONE HYDROCHLORIDE 0.2 MG: 0.2 INJECTION, SOLUTION INTRAMUSCULAR; INTRAVENOUS; SUBCUTANEOUS at 12:39

## 2024-01-01 RX ADMIN — POTASSIUM CHLORIDE 20 MEQ: 14.9 INJECTION, SOLUTION INTRAVENOUS at 09:03

## 2024-01-01 RX ADMIN — PRAVASTATIN SODIUM 80 MG: 80 TABLET ORAL at 17:37

## 2024-01-01 RX ADMIN — APIXABAN 5 MG: 5 TABLET, FILM COATED ORAL at 17:18

## 2024-01-01 RX ADMIN — ESCITALOPRAM OXALATE 20 MG: 20 TABLET ORAL at 10:47

## 2024-01-01 RX ADMIN — TICAGRELOR 90 MG: 90 TABLET ORAL at 22:22

## 2024-01-01 RX ADMIN — INSULIN LISPRO 2 UNITS: 100 INJECTION, SOLUTION INTRAVENOUS; SUBCUTANEOUS at 12:40

## 2024-01-01 RX ADMIN — APIXABAN 5 MG: 5 TABLET, FILM COATED ORAL at 09:56

## 2024-01-01 RX ADMIN — INSULIN LISPRO 2 UNITS: 100 INJECTION, SOLUTION INTRAVENOUS; SUBCUTANEOUS at 17:36

## 2024-01-01 RX ADMIN — PANTOPRAZOLE SODIUM 40 MG: 40 TABLET, DELAYED RELEASE ORAL at 11:30

## 2024-01-01 RX ADMIN — INSULIN LISPRO 2 UNITS: 100 INJECTION, SOLUTION INTRAVENOUS; SUBCUTANEOUS at 17:24

## 2024-01-01 RX ADMIN — POTASSIUM CHLORIDE 20 MEQ: 14.9 INJECTION, SOLUTION INTRAVENOUS at 11:31

## 2024-01-01 RX ADMIN — LIDOCAINE 5% 1 PATCH: 700 PATCH TOPICAL at 14:40

## 2024-01-01 RX ADMIN — SODIUM BICARBONATE 650 MG TABLET 650 MG: at 11:30

## 2024-01-01 RX ADMIN — INSULIN LISPRO 2 UNITS: 100 INJECTION, SOLUTION INTRAVENOUS; SUBCUTANEOUS at 08:02

## 2024-01-01 RX ADMIN — LIDOCAINE 5% 1 PATCH: 700 PATCH TOPICAL at 10:02

## 2024-01-01 RX ADMIN — APIXABAN 5 MG: 5 TABLET, FILM COATED ORAL at 10:47

## 2024-01-01 RX ADMIN — HYDROMORPHONE HYDROCHLORIDE 0.2 MG: 0.2 INJECTION, SOLUTION INTRAMUSCULAR; INTRAVENOUS; SUBCUTANEOUS at 05:07

## 2024-01-01 RX ADMIN — POTASSIUM CHLORIDE 20 MEQ: 14.9 INJECTION, SOLUTION INTRAVENOUS at 17:42

## 2024-01-01 RX ADMIN — HYDROMORPHONE HYDROCHLORIDE 0.2 MG: 0.2 INJECTION, SOLUTION INTRAMUSCULAR; INTRAVENOUS; SUBCUTANEOUS at 11:56

## 2024-01-01 RX ADMIN — INSULIN LISPRO 2 UNITS: 100 INJECTION, SOLUTION INTRAVENOUS; SUBCUTANEOUS at 17:18

## 2024-01-01 RX ADMIN — HYDROMORPHONE HYDROCHLORIDE 0.2 MG: 0.2 INJECTION, SOLUTION INTRAMUSCULAR; INTRAVENOUS; SUBCUTANEOUS at 22:21

## 2024-01-01 RX ADMIN — IOHEXOL 100 ML: 350 INJECTION, SOLUTION INTRAVENOUS at 20:38

## 2024-01-01 RX ADMIN — LIDOCAINE 5% 1 PATCH: 700 PATCH TOPICAL at 10:47

## 2024-01-01 RX ADMIN — INSULIN LISPRO 2 UNITS: 100 INJECTION, SOLUTION INTRAVENOUS; SUBCUTANEOUS at 17:07

## 2024-01-01 RX ADMIN — DEXTROSE 50 ML/HR: 5 SOLUTION INTRAVENOUS at 14:46

## 2024-01-01 RX ADMIN — HYDROMORPHONE HYDROCHLORIDE 0.2 MG: 0.2 INJECTION, SOLUTION INTRAMUSCULAR; INTRAVENOUS; SUBCUTANEOUS at 11:07

## 2024-01-01 RX ADMIN — INSULIN LISPRO 2 UNITS: 100 INJECTION, SOLUTION INTRAVENOUS; SUBCUTANEOUS at 13:01

## 2024-01-01 RX ADMIN — HYDROMORPHONE HYDROCHLORIDE 0.2 MG: 0.2 INJECTION, SOLUTION INTRAMUSCULAR; INTRAVENOUS; SUBCUTANEOUS at 21:01

## 2024-01-01 RX ADMIN — SILVER NITRATE APPLICATORS 1 APPLICATOR: 25; 75 STICK TOPICAL at 06:24

## 2024-01-01 RX ADMIN — HYDROMORPHONE HYDROCHLORIDE 0.2 MG: 0.2 INJECTION, SOLUTION INTRAMUSCULAR; INTRAVENOUS; SUBCUTANEOUS at 18:18

## 2024-01-01 RX ADMIN — ESCITALOPRAM OXALATE 20 MG: 20 TABLET ORAL at 09:56

## 2024-01-01 RX ADMIN — PIPERACILLIN SODIUM AND TAZOBACTAM SODIUM 4.5 G: 36; 4.5 INJECTION, POWDER, LYOPHILIZED, FOR SOLUTION INTRAVENOUS at 23:44

## 2024-01-01 RX ADMIN — CEFTRIAXONE SODIUM 1000 MG: 10 INJECTION, POWDER, FOR SOLUTION INTRAVENOUS at 18:11

## 2024-01-01 RX ADMIN — PANTOPRAZOLE SODIUM 40 MG: 40 TABLET, DELAYED RELEASE ORAL at 09:57

## 2024-01-01 RX ADMIN — METOPROLOL SUCCINATE 25 MG: 25 TABLET, EXTENDED RELEASE ORAL at 10:47

## 2024-01-01 RX ADMIN — INSULIN LISPRO 1 UNITS: 100 INJECTION, SOLUTION INTRAVENOUS; SUBCUTANEOUS at 05:11

## 2024-01-01 RX ADMIN — DEXTROSE 50 ML/HR: 5 SOLUTION INTRAVENOUS at 08:25

## 2024-01-01 RX ADMIN — DEXTROSE 50 ML/HR: 5 SOLUTION INTRAVENOUS at 19:34

## 2024-01-01 RX ADMIN — APIXABAN 5 MG: 5 TABLET, FILM COATED ORAL at 17:07

## 2024-01-01 RX ADMIN — APIXABAN 5 MG: 5 TABLET, FILM COATED ORAL at 11:30

## 2024-01-01 RX ADMIN — TICAGRELOR 90 MG: 90 TABLET ORAL at 20:50

## 2024-01-01 RX ADMIN — ALLOPURINOL 100 MG: 100 TABLET ORAL at 09:57

## 2024-01-01 RX ADMIN — HYDROMORPHONE HYDROCHLORIDE 0.2 MG: 0.2 INJECTION, SOLUTION INTRAMUSCULAR; INTRAVENOUS; SUBCUTANEOUS at 04:18

## 2024-01-01 RX ADMIN — TICAGRELOR 90 MG: 90 TABLET ORAL at 11:30

## 2024-01-01 RX ADMIN — TICAGRELOR 90 MG: 90 TABLET ORAL at 09:57

## 2024-01-01 RX ADMIN — DEXTROSE 80 ML/HR: 5 SOLUTION INTRAVENOUS at 20:39

## 2024-01-01 RX ADMIN — ALLOPURINOL 100 MG: 100 TABLET ORAL at 10:47

## 2024-01-01 RX ADMIN — PIPERACILLIN SODIUM AND TAZOBACTAM SODIUM 4.5 G: 36; 4.5 INJECTION, POWDER, LYOPHILIZED, FOR SOLUTION INTRAVENOUS at 21:28

## 2024-01-01 RX ADMIN — INSULIN LISPRO 2 UNITS: 100 INJECTION, SOLUTION INTRAVENOUS; SUBCUTANEOUS at 10:03

## 2024-01-01 RX ADMIN — TICAGRELOR 90 MG: 90 TABLET ORAL at 10:55

## 2024-01-01 RX ADMIN — ACETAMINOPHEN 650 MG: 325 TABLET ORAL at 14:40

## 2024-01-01 RX ADMIN — INSULIN LISPRO 2 UNITS: 100 INJECTION, SOLUTION INTRAVENOUS; SUBCUTANEOUS at 12:02

## 2024-01-01 RX ADMIN — INSULIN LISPRO 1 UNITS: 100 INJECTION, SOLUTION INTRAVENOUS; SUBCUTANEOUS at 12:47

## 2024-01-01 RX ADMIN — PANTOPRAZOLE SODIUM 40 MG: 40 TABLET, DELAYED RELEASE ORAL at 10:47

## 2024-01-01 RX ADMIN — PANTOPRAZOLE SODIUM 40 MG: 40 TABLET, DELAYED RELEASE ORAL at 17:18

## 2024-01-01 RX ADMIN — PRAVASTATIN SODIUM 80 MG: 80 TABLET ORAL at 17:07

## 2024-01-01 RX ADMIN — ACETAMINOPHEN 650 MG: 325 TABLET ORAL at 11:00

## 2024-01-01 RX ADMIN — ESCITALOPRAM OXALATE 20 MG: 20 TABLET ORAL at 11:30

## 2024-01-01 RX ADMIN — POTASSIUM CHLORIDE 20 MEQ: 14.9 INJECTION, SOLUTION INTRAVENOUS at 14:55

## 2024-01-02 RX ORDER — AMLODIPINE BESYLATE 5 MG/1
5 TABLET ORAL DAILY
Qty: 90 TABLET | Refills: 0 | Status: SHIPPED | OUTPATIENT
Start: 2024-01-02

## 2024-01-06 ENCOUNTER — APPOINTMENT (OUTPATIENT)
Dept: LAB | Facility: HOSPITAL | Age: 79
End: 2024-01-06
Payer: COMMERCIAL

## 2024-01-06 DIAGNOSIS — N18.4 STAGE 4 CHRONIC KIDNEY DISEASE (HCC): ICD-10-CM

## 2024-01-06 DIAGNOSIS — R80.9 PROTEINURIA, UNSPECIFIED TYPE: ICD-10-CM

## 2024-01-06 DIAGNOSIS — E11.21 TYPE 2 DIABETES MELLITUS WITH DIABETIC NEPHROPATHY, WITHOUT LONG-TERM CURRENT USE OF INSULIN (HCC): ICD-10-CM

## 2024-01-06 DIAGNOSIS — I10 PRIMARY HYPERTENSION: ICD-10-CM

## 2024-01-06 DIAGNOSIS — Z78.9 STATIN INTOLERANCE: ICD-10-CM

## 2024-01-06 DIAGNOSIS — Z79.4 TYPE 2 DIABETES MELLITUS WITH HYPERGLYCEMIA, WITH LONG-TERM CURRENT USE OF INSULIN (HCC): ICD-10-CM

## 2024-01-06 DIAGNOSIS — I74.09 AORTOILIAC OCCLUSIVE DISEASE (HCC): ICD-10-CM

## 2024-01-06 DIAGNOSIS — I65.22 SYMPTOMATIC CAROTID ARTERY STENOSIS, LEFT: ICD-10-CM

## 2024-01-06 DIAGNOSIS — E78.5 HYPERLIPIDEMIA ASSOCIATED WITH TYPE 2 DIABETES MELLITUS: ICD-10-CM

## 2024-01-06 DIAGNOSIS — N18.4 HYPERTENSIVE KIDNEY DISEASE WITH STAGE 4 CHRONIC KIDNEY DISEASE (HCC): ICD-10-CM

## 2024-01-06 DIAGNOSIS — E79.0 HYPERURICEMIA: ICD-10-CM

## 2024-01-06 DIAGNOSIS — I12.9 HYPERTENSIVE KIDNEY DISEASE WITH STAGE 4 CHRONIC KIDNEY DISEASE (HCC): ICD-10-CM

## 2024-01-06 DIAGNOSIS — E11.65 TYPE 2 DIABETES MELLITUS WITH HYPERGLYCEMIA, WITH LONG-TERM CURRENT USE OF INSULIN (HCC): ICD-10-CM

## 2024-01-06 DIAGNOSIS — E83.52 HYPERCALCEMIA: ICD-10-CM

## 2024-01-06 DIAGNOSIS — N25.81 SECONDARY HYPERPARATHYROIDISM OF RENAL ORIGIN (HCC): ICD-10-CM

## 2024-01-06 DIAGNOSIS — E11.69 HYPERLIPIDEMIA ASSOCIATED WITH TYPE 2 DIABETES MELLITUS: ICD-10-CM

## 2024-01-06 LAB
ALBUMIN SERPL BCP-MCNC: 3.9 G/DL (ref 3.5–5)
ALP SERPL-CCNC: 111 U/L (ref 34–104)
ALT SERPL W P-5'-P-CCNC: 7 U/L (ref 7–52)
ANION GAP SERPL CALCULATED.3IONS-SCNC: 8 MMOL/L
AST SERPL W P-5'-P-CCNC: 11 U/L (ref 13–39)
BASOPHILS # BLD AUTO: 0.06 THOUSANDS/ÂΜL (ref 0–0.1)
BASOPHILS NFR BLD AUTO: 1 % (ref 0–1)
BILIRUB SERPL-MCNC: 0.6 MG/DL (ref 0.2–1)
BUN SERPL-MCNC: 20 MG/DL (ref 5–25)
CALCIUM SERPL-MCNC: 9.1 MG/DL (ref 8.4–10.2)
CHLORIDE SERPL-SCNC: 110 MMOL/L (ref 96–108)
CHOLEST SERPL-MCNC: 196 MG/DL
CO2 SERPL-SCNC: 24 MMOL/L (ref 21–32)
CREAT SERPL-MCNC: 1.39 MG/DL (ref 0.6–1.3)
EOSINOPHIL # BLD AUTO: 0.31 THOUSAND/ÂΜL (ref 0–0.61)
EOSINOPHIL NFR BLD AUTO: 4 % (ref 0–6)
ERYTHROCYTE [DISTWIDTH] IN BLOOD BY AUTOMATED COUNT: 14.4 % (ref 11.6–15.1)
GFR SERPL CREATININE-BSD FRML MDRD: 36 ML/MIN/1.73SQ M
GLUCOSE P FAST SERPL-MCNC: 118 MG/DL (ref 65–99)
HCT VFR BLD AUTO: 36.2 % (ref 34.8–46.1)
HDLC SERPL-MCNC: 49 MG/DL
HGB BLD-MCNC: 12.1 G/DL (ref 11.5–15.4)
IMM GRANULOCYTES # BLD AUTO: 0.05 THOUSAND/UL (ref 0–0.2)
IMM GRANULOCYTES NFR BLD AUTO: 1 % (ref 0–2)
LDLC SERPL CALC-MCNC: 115 MG/DL (ref 0–100)
LYMPHOCYTES # BLD AUTO: 0.97 THOUSANDS/ÂΜL (ref 0.6–4.47)
LYMPHOCYTES NFR BLD AUTO: 12 % (ref 14–44)
MCH RBC QN AUTO: 30.6 PG (ref 26.8–34.3)
MCHC RBC AUTO-ENTMCNC: 33.4 G/DL (ref 31.4–37.4)
MCV RBC AUTO: 91 FL (ref 82–98)
MONOCYTES # BLD AUTO: 0.63 THOUSAND/ÂΜL (ref 0.17–1.22)
MONOCYTES NFR BLD AUTO: 8 % (ref 4–12)
NEUTROPHILS # BLD AUTO: 6.16 THOUSANDS/ÂΜL (ref 1.85–7.62)
NEUTS SEG NFR BLD AUTO: 74 % (ref 43–75)
NRBC BLD AUTO-RTO: 0 /100 WBCS
PLATELET # BLD AUTO: 323 THOUSANDS/UL (ref 149–390)
PMV BLD AUTO: 9.8 FL (ref 8.9–12.7)
POTASSIUM SERPL-SCNC: 4.3 MMOL/L (ref 3.5–5.3)
PROT SERPL-MCNC: 7.1 G/DL (ref 6.4–8.4)
RBC # BLD AUTO: 3.96 MILLION/UL (ref 3.81–5.12)
SODIUM SERPL-SCNC: 142 MMOL/L (ref 135–147)
TRIGL SERPL-MCNC: 161 MG/DL
TSH SERPL DL<=0.05 MIU/L-ACNC: 1.86 UIU/ML (ref 0.45–4.5)
WBC # BLD AUTO: 8.18 THOUSAND/UL (ref 4.31–10.16)

## 2024-01-06 PROCEDURE — 80053 COMPREHEN METABOLIC PANEL: CPT

## 2024-01-06 PROCEDURE — 80061 LIPID PANEL: CPT

## 2024-01-06 PROCEDURE — 83036 HEMOGLOBIN GLYCOSYLATED A1C: CPT

## 2024-01-06 PROCEDURE — 36415 COLL VENOUS BLD VENIPUNCTURE: CPT

## 2024-01-06 PROCEDURE — 85025 COMPLETE CBC W/AUTO DIFF WBC: CPT

## 2024-01-06 PROCEDURE — 84443 ASSAY THYROID STIM HORMONE: CPT

## 2024-01-07 LAB
EST. AVERAGE GLUCOSE BLD GHB EST-MCNC: 117 MG/DL
HBA1C MFR BLD: 5.7 %

## 2024-01-08 ENCOUNTER — OFFICE VISIT (OUTPATIENT)
Dept: INTERNAL MEDICINE CLINIC | Facility: CLINIC | Age: 79
End: 2024-01-08
Payer: COMMERCIAL

## 2024-01-08 VITALS
BODY MASS INDEX: 26.23 KG/M2 | SYSTOLIC BLOOD PRESSURE: 122 MMHG | HEIGHT: 60 IN | HEART RATE: 65 BPM | WEIGHT: 133.6 LBS | OXYGEN SATURATION: 97 % | DIASTOLIC BLOOD PRESSURE: 76 MMHG

## 2024-01-08 DIAGNOSIS — N18.31 TYPE 2 DIABETES MELLITUS WITH STAGE 3A CHRONIC KIDNEY DISEASE, WITHOUT LONG-TERM CURRENT USE OF INSULIN (HCC): ICD-10-CM

## 2024-01-08 DIAGNOSIS — C67.8 MALIGNANT NEOPLASM OF OVERLAPPING SITES OF BLADDER (HCC): ICD-10-CM

## 2024-01-08 DIAGNOSIS — I12.9 HYPERTENSIVE KIDNEY DISEASE WITH STAGE 4 CHRONIC KIDNEY DISEASE (HCC): ICD-10-CM

## 2024-01-08 DIAGNOSIS — E11.22 TYPE 2 DIABETES MELLITUS WITH STAGE 3A CHRONIC KIDNEY DISEASE, WITHOUT LONG-TERM CURRENT USE OF INSULIN (HCC): ICD-10-CM

## 2024-01-08 DIAGNOSIS — N25.81 SECONDARY HYPERPARATHYROIDISM OF RENAL ORIGIN (HCC): ICD-10-CM

## 2024-01-08 DIAGNOSIS — I74.09 AORTOILIAC OCCLUSIVE DISEASE (HCC): ICD-10-CM

## 2024-01-08 DIAGNOSIS — C49.A0 GASTROINTESTINAL STROMAL TUMOR, UNSPECIFIED SITE (HCC): Primary | ICD-10-CM

## 2024-01-08 DIAGNOSIS — M54.50 ACUTE MIDLINE LOW BACK PAIN WITHOUT SCIATICA: ICD-10-CM

## 2024-01-08 DIAGNOSIS — N18.4 HYPERTENSIVE KIDNEY DISEASE WITH STAGE 4 CHRONIC KIDNEY DISEASE (HCC): ICD-10-CM

## 2024-01-08 PROBLEM — I12.0 HYPERTENSIVE CHRONIC KIDNEY DISEASE WITH STAGE 5 CHRONIC KIDNEY DISEASE OR END STAGE RENAL DISEASE (HCC): Status: ACTIVE | Noted: 2024-01-08

## 2024-01-08 PROCEDURE — 99214 OFFICE O/P EST MOD 30 MIN: CPT | Performed by: INTERNAL MEDICINE

## 2024-01-08 NOTE — PROGRESS NOTES
Assessment/Plan:     Patient is here for routine follow up, reviewed chronic medical problems, ordered labs for next visit including CBC CMP TSH A1C LIPID. Reviewed labs for this visit.    H/o type 2 DM, following with endocrine, continue current regimen.    H/o L MCA and PCA CV, s/p L TCAR on 9/2023, she continues to take aspirin, Brilinta, and statin, aphasia is better; right residual weakness is better, declining further PT and OT, speech therapy.    H/o HTN, continue current regimen.     Continue f/u with nephrology, h/o CKD stage 4; continue sensipar, vascular surgery will discuss with nephrology about upcoming scan needing contrast administration, she is firmly against dialysis, even temporary, she explains today. Reports continued leg pain, foot pain, numbness.    Low back pain, right lower back, non radiating, started after sleeping on the recliner with her cat. No red flag symptoms, x 3 weeks; obtain xray.    10 pound weight loss, check weight in 1-2 weeks;     Quality Measures:       Depression Screening and Follow-up Plan: Patient was screened for depression during today's encounter. They screened negative with a PHQ-9 score of 0.         Return in about 3 months (around 4/8/2024).    No problem-specific Assessment & Plan notes found for this encounter.       Diagnoses and all orders for this visit:    Gastrointestinal stromal tumor, unspecified site (HCC)    Aortoiliac occlusive disease (HCC)    Malignant neoplasm of overlapping sites of bladder (HCC)    Type 2 diabetes mellitus with stage 3a chronic kidney disease, without long-term current use of insulin (HCC)    Secondary hyperparathyroidism of renal origin (HCC)    Acute midline low back pain without sciatica  -     XR spine lumbar minimum 4 views non injury; Future    Hypertensive kidney disease with stage 4 chronic kidney disease (HCC)          Subjective:      Patient ID: Anamaria Parnell is a 78 y.o. female.    Here for routine  "f/u.        ALLERGIES:  Allergies   Allergen Reactions    Fenofibrate Other (See Comments)      blood in urine  hx  Kidney Failure    Colesevelam Other (See Comments)      leg pains    Colestipol Itching and Other (See Comments)      Swelling lower legs    Ezetimibe GI Intolerance    Statins Myalgia       CURRENT MEDICATIONS:    Current Outpatient Medications:     acetaminophen (TYLENOL) 500 mg tablet, Take 650 mg by mouth every 6 (six) hours as needed for mild pain, Disp: , Rfl:     allopurinol (ZYLOPRIM) 100 mg tablet, TAKE 1 TABLET BY MOUTH EVERY DAY, Disp: 90 tablet, Rfl: 2    amLODIPine (NORVASC) 5 mg tablet, Take 1 tablet (5 mg total) by mouth daily Primary hypertension [I10], Disp: 90 tablet, Rfl: 0    aspirin 81 mg chewable tablet, Chew 81 mg daily, Disp: , Rfl:     cinacalcet (SENSIPAR) 30 mg tablet, Take 1 tablet (30 mg total) by mouth every other day, Disp: 45 tablet, Rfl: 2    cloNIDine (CATAPRES-TTS-3) 0.3 mg/24 hr, PLACE 1 PATCH (0.3 MG TOTAL) ON THE SKIN ONCE A WEEK, Disp: 12 patch, Rfl: 1    escitalopram (LEXAPRO) 10 mg tablet, Take 1 tablet (10 mg total) by mouth daily, Disp: 90 tablet, Rfl: 0    Glucagon 1 MG/0.2ML SOAJ, Inject 1 mg under the skin if needed (low blood sugar), Disp: , Rfl:     insulin glargine (LANTUS) 100 units/mL subcutaneous injection, Inject 8 Units under the skin daily at bedtime, Disp: 10 mL, Rfl: 0    insulin lispro (HumaLOG) 100 units/mL injection, Inject under the skin, Disp: , Rfl:     Insulin Pen Needle (PEN NEEDLES 31GX5/16\") 31G X 8 MM MISC, Use 2 (two) times a day, Disp: 30 each, Rfl: 1    labetalol (NORMODYNE) 300 mg tablet, Take 1 tablet (300 mg total) by mouth every 12 (twelve) hours, Disp: , Rfl: 0    Lantus SoloStar 100 units/mL SOPN, 9 UNITS SUBCUTANEOUS AT BEDTIME, Disp: , Rfl:     lidocaine (LIDODERM) 5 %, Apply 1 patch topically daily Remove & Discard patch within 12 hours or as directed by MD, Disp: , Rfl:     losartan (COZAAR) 100 MG tablet, Take 1 tablet " (100 mg total) by mouth daily, Disp: 90 tablet, Rfl: 1    pravastatin (PRAVACHOL) 80 mg tablet, Take 1 tablet (80 mg total) by mouth daily with dinner, Disp: 30 tablet, Rfl: 0    ticagrelor (BRILINTA) 90 MG, Take 1 tablet (90 mg total) by mouth every 12 (twelve) hours, Disp: 60 tablet, Rfl: 5    traZODone (DESYREL) 50 mg tablet, TAKE 1 TABLET BY MOUTH DAILY AT BEDTIME, Disp: 90 tablet, Rfl: 1    Zinc Oxide, Topical, 16 & 40 % KIT, Apply topically 2 (two) times a day Pressure Ulcer, Disp: , Rfl:     ACTIVE PROBLEM LIST:  Patient Active Problem List   Diagnosis    Basilar artery stenosis    CVA (cerebral vascular accident) ([facility])    Chronic GERD    Acute renal failure superimposed on stage 3 chronic kidney disease (HCC)    Claudication in peripheral vascular disease (HCC)    Type 2 diabetes mellitus with diabetic nephropathy (HCC)    Gout    Hx-TIA (transient ischemic attack)    Hypercholesteremia    Hyperlipidemia associated with type 2 diabetes mellitus     Hypertension    Hypertension associated with diabetes     Osteoarthritis    Polyneuropathy    Right renal artery stenosis (HCC)    Vertebrobasilar artery syndrome    Vitamin D deficiency    Statin intolerance    Lumbar disc herniation    Spondylosis of lumbar region without myelopathy or radiculopathy    Aortoiliac stenosis, left (HCC)    Lumbosacral spondylosis without myelopathy    Hyperlipidemia, unspecified    Herniated lumbar intervertebral disc    Atherosclerosis of renal artery (HCC)    Celiac artery stenosis (HCC) >70% stenosis in the celiac trunk    Abnormal CT of the chest suspicious for an infectious or inflammatory bronchiolitis.    Positive cardiac stress test  small, mildly severe, partially reversible myocardial perfusion defect of anterior and inferior wall     Femoral artery stenosis, right (HCC) 50-75% stenosis in the common femoral artery.    Mesenteric artery stenosis (HCC)    Median arcuate ligament syndrome (HCC)    Atherosclerosis of native  arteries of extremities with rest pain, right leg (HCC)    Symptomatic carotid artery stenosis, left    Pulmonary nodule 7 mm groundglass opacity in the right upper lobe, unchanged since October 2019    Stenosis of left anterior descending artery Mid LAD 50-60% stenosis    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    Malignant neoplasm of overlapping sites of bladder (HCC)    Cervical radiculopathy    Stage 4 chronic kidney disease (HCC)    Hypertensive kidney disease with stage 4 chronic kidney disease (HCC)    Depression, recurrent (HCC)    Type 2 diabetes mellitus, without long-term current use of insulin (HCC)    Type 2 diabetes mellitus with diabetic peripheral angiopathy without gangrene (HCC)    Gastrointestinal stromal tumor (GIST) (HCC)    History of gastrointestinal stromal tumor (GIST)    Secondary hyperparathyroidism of renal origin (HCC)    Aortoiliac occlusive disease (HCC)    Hypertensive urgency    Dysarthria    Stage 3b chronic kidney disease (HCC)    Acute CVA (cerebrovascular accident) (HCC)    Primary hypertension    Smoking    Renal cyst    Encounter to discuss test results    Complex renal cyst    Encounter for follow-up surveillance of urothelial carcinoma of lower urinary tract    Carotid stenosis, asymptomatic, right    History of left common carotid artery stent placement    Hypertensive chronic kidney disease with stage 5 chronic kidney disease or end stage renal disease (HCC)       PAST MEDICAL HISTORY:  Past Medical History:   Diagnosis Date    Aphasia as late effect of cerebrovascular accident (CVA) 08/16/2023    Arthritis     Chronic kidney disease     Diabetes mellitus (HCC)     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/20/2021    Endometriosis     High cholesterol     History of left common carotid artery stent placement 10/27/2023    Hypertension     Kidney disease, chronic, stage III (moderate, EGFR 30-59  ml/min) ([facility])     Lumbar disc herniation     Neuropathy     Obesity (BMI 30-39.9) 12/04/2017    Obesity, morbid ([facility]) 01/20/2021    Peripheral vascular disease ([facility])     Pneumonia     Shoulder injury     left    Spinal stenosis     Stroke ([facility]) 2015    Memory loss       PAST SURGICAL HISTORY:  Past Surgical History:   Procedure Laterality Date    CARDIAC CATHETERIZATION      CAROTID STENT Left 9/7/2023    Procedure: L TCAR;  Surgeon: Nicole Gallo MD;  Location: [facility];  Service: Vascular    COLONOSCOPY      FL RETROGRADE PYELOGRAM  4/6/2020    FRACTURE SURGERY Right     ankle    IR CAROTID STENT  9/7/2023    OVARIAN CYST SURGERY      NH CYSTO BLADDER W/URETERAL CATHETERIZATION Bilateral 4/6/2020    Procedure: CYSTOSCOPY WITH RETROGRADE PYELOGRAM;  Surgeon: Robert Mitchell MD;  Location: [facility];  Service: Urology    NH CYSTO W/INSERT URETERAL STENT Right 4/6/2020    Procedure: INSERTION STENT URETERAL;  Surgeon: Robert Mitchell MD;  [facility];  Service: Urology    NH CYSTO W/REMOVAL OF TUMORS SMALL N/A 4/6/2020    Procedure: TRANSURETHRAL RESECTION OF BLADDER TUMOR (TURBT);  Surgeon: Robert Mitchell MD;  Location[facility];  Service: Urology    ROTATOR CUFF REPAIR Left     TONSILLECTOMY         FAMILY HISTORY:  Family History   Problem Relation Age of Onset    Hypertension Mother     Heart disease Mother         Valvular    Hyperlipidemia Mother     Hypertension Father     Heart defect Father         Cardiomegaly    Stroke Sister         Cerebrovascular Accident    Arthritis Brother     Other Brother         Back Disorder       SOCIAL HISTORY:  Social History     Socioeconomic History    Marital status: /Civil Union     Spouse name: Not on file    Number of children: Not on file    Years of education: Not on file    Highest education level: Not on file   Occupational History    Occupation: retired   Tobacco Use    Smoking status: Former     Current packs/day: 0.00     Average  packs/day: 2.0 packs/day for 54.6 years (109.1 ttl pk-yrs)     Types: Cigarettes     Start date: 1966     Quit date: 7/27/2020     Years since quitting: 3.4    Smokeless tobacco: Never   Vaping Use    Vaping status: Never Used   Substance and Sexual Activity    Alcohol use: Never    Drug use: Never    Sexual activity: Not Currently     Partners: Male   Other Topics Concern    Not on file   Social History Narrative    Occasional Caffeine Consumption     Social Determinants of Health     Financial Resource Strain: Not on file   Food Insecurity: No Food Insecurity (9/5/2023)    Hunger Vital Sign     Worried About Running Out of Food in the Last Year: Never true     Ran Out of Food in the Last Year: Never true   Transportation Needs: No Transportation Needs (9/5/2023)    PRAPARE - Transportation     Lack of Transportation (Medical): No     Lack of Transportation (Non-Medical): No   Physical Activity: Not on file   Stress: Not on file   Social Connections: Not on file   Intimate Partner Violence: Not on file   Housing Stability: Low Risk  (9/5/2023)    Housing Stability Vital Sign     Unable to Pay for Housing in the Last Year: No     Number of Places Lived in the Last Year: 1     Unstable Housing in the Last Year: No       Review of Systems   Constitutional:  Negative for chills and fever.   HENT:  Negative for ear pain and sore throat.    Eyes:  Negative for pain and visual disturbance.   Respiratory:  Negative for cough and shortness of breath.    Cardiovascular:  Negative for chest pain and palpitations.   Gastrointestinal:  Negative for abdominal pain and vomiting.   Genitourinary:  Negative for dysuria and hematuria.   Musculoskeletal:  Positive for arthralgias, back pain and gait problem.   Skin:  Negative for color change and rash.   Neurological:  Negative for seizures and syncope.   All other systems reviewed and are negative.        Objective:  Vitals:    01/08/24 1005   BP: 122/76   BP Location: Left arm    Patient Position: Sitting   Cuff Size: Adult   Pulse: 65   SpO2: 97%   Weight: 60.6 kg (133 lb 9.6 oz)   Height: 5' (1.524 m)     Body mass index is 26.09 kg/m².     Physical Exam  Vitals and nursing note reviewed.   Constitutional:       Appearance: Normal appearance.   HENT:      Head: Normocephalic and atraumatic.   Cardiovascular:      Rate and Rhythm: Normal rate and regular rhythm.      Heart sounds: Normal heart sounds.   Pulmonary:      Effort: Pulmonary effort is normal.      Breath sounds: Normal breath sounds.   Musculoskeletal:         General: Normal range of motion.      Cervical back: Normal range of motion.      Right lower leg: No edema.      Left lower leg: No edema.   Skin:     General: Skin is warm and dry.   Neurological:      General: No focal deficit present.      Mental Status: She is alert and oriented to person, place, and time. Mental status is at baseline.      Motor: Weakness present.      Gait: Gait abnormal.   Psychiatric:         Mood and Affect: Mood normal.           RESULTS:  Hemoglobin A1C   Date/Time Value Ref Range Status   01/06/2024 11:42 AM 5.7 (H) Normal 4.0-5.6%; PreDiabetic 5.7-6.4%; Diabetic >=6.5%; Glycemic control for adults with diabetes <7.0% % Final   09/08/2020 09:21 AM 7.3 (H) 4.8 - 5.6 % Final     Comment:              Prediabetes: 5.7 - 6.4           Diabetes: >6.4           Glycemic control for adults with diabetes: <7.0       Cholesterol   Date/Time Value Ref Range Status   01/06/2024 11:42  See Comment mg/dL Final     Comment:     Cholesterol:         Pediatric <18 Years        Desirable          <170 mg/dL      Borderline High    170-199 mg/dL      High               >=200 mg/dL        Adult >=18 Years            Desirable        <200 mg/dL      Borderline High  200-239 mg/dL      High             >239 mg/dL       Cholesterol, Total   Date/Time Value Ref Range Status   09/08/2020 09:21  (H) 100 - 199 mg/dL Final     Triglycerides   Date/Time  Value Ref Range Status   01/06/2024 11:42  (H) See Comment mg/dL Final     Comment:     Triglyceride:     0-9Y            <75mg/dL     10Y-17Y         <90 mg/dL       >=18Y     Normal          <150 mg/dL     Borderline High 150-199 mg/dL     High            200-499 mg/dL        Very High       >499 mg/dL    Specimen collection should occur prior to Metamizole administration due to the potential for falsely depressed results.   09/08/2020 09:21  (H) 0 - 149 mg/dL Final     HDL   Date/Time Value Ref Range Status   09/08/2020 09:21 AM 36 (L) >39 mg/dL Final     HDL, Direct   Date/Time Value Ref Range Status   01/06/2024 11:42 AM 49 (L) >=50 mg/dL Final     LDL Calculated   Date/Time Value Ref Range Status   01/06/2024 11:42  (H) 0 - 100 mg/dL Final     Comment:     LDL Cholesterol:     Optimal           <100 mg/dl     Near Optimal      100-129 mg/dl     Above Optimal       Borderline High 130-159 mg/dl       High            160-189 mg/dl       Very High       >189 mg/dl         This screening LDL is a calculated result.   It does not have the accuracy of the Direct Measured LDL in the monitoring of patients with hyperlipidemia and/or statin therapy.   Direct Measure LDL (CLL719) must be ordered separately in these patients.   09/08/2020 09:21  (H) 0 - 99 mg/dL Final     Hemoglobin   Date/Time Value Ref Range Status   01/06/2024 11:42 AM 12.1 11.5 - 15.4 g/dL Final     Hematocrit   Date/Time Value Ref Range Status   01/06/2024 11:42 AM 36.2 34.8 - 46.1 % Final     Platelets   Date/Time Value Ref Range Status   01/06/2024 11:42  149 - 390 Thousands/uL Final     TSH 3RD GENERATON   Date/Time Value Ref Range Status   01/06/2024 11:42 AM 1.856 0.450 - 4.500 uIU/mL Final     Comment:     The recommended reference ranges for TSH during pregnancy are as follows:   First trimester 0.100 to 2.500 uIU/mL   Second trimester  0.200 to 3.000 uIU/mL   Third trimester 0.300 to 3.000 uIU/m    Note: Normal  ranges may not apply to patients who are transgender, non-binary, or whose legal sex, sex at birth, and gender identity differ.  Adult TSH (3rd generation) reference range follows the recommended guidelines of the [employer], January, 2020.     Free T4   Date/Time Value Ref Range Status   04/12/2021 09:59 AM 1.07 0.76 - 1.46 ng/dL Final     Comment:     Specimen collection should occur prior to Sulfasalazine administration due to the potential for falsely elevated results.     Sodium   Date/Time Value Ref Range Status   01/06/2024 11:42  135 - 147 mmol/L Final   10/20/2020 07:57  134 - 144 mmol/L Final     BUN   Date/Time Value Ref Range Status   01/06/2024 11:42 AM 20 5 - 25 mg/dL Final   10/20/2020 07:57 AM 45 (H) 8 - 27 mg/dL Final     Creatinine   Date/Time Value Ref Range Status   01/06/2024 11:42 AM 1.39 (H) 0.60 - 1.30 mg/dL Final     Comment:     Standardized to IDMS reference method      In chart    This note was created with voice recognition software.  Phonic, grammatical and spelling errors may be present within the note as a result.

## 2024-01-14 DIAGNOSIS — E11.65 TYPE 2 DIABETES MELLITUS WITH HYPERGLYCEMIA, WITH LONG-TERM CURRENT USE OF INSULIN (HCC): ICD-10-CM

## 2024-01-14 DIAGNOSIS — Z79.4 TYPE 2 DIABETES MELLITUS WITH HYPERGLYCEMIA, WITH LONG-TERM CURRENT USE OF INSULIN (HCC): ICD-10-CM

## 2024-01-15 DIAGNOSIS — I10 HYPERTENSION, UNSPECIFIED TYPE: ICD-10-CM

## 2024-01-15 RX ORDER — PEN NEEDLE, DIABETIC 31 GX5/16"
NEEDLE, DISPOSABLE MISCELLANEOUS 2 TIMES DAILY
Qty: 180 EACH | Refills: 2 | Status: SHIPPED | OUTPATIENT
Start: 2024-01-15

## 2024-01-15 RX ORDER — LOSARTAN POTASSIUM 100 MG/1
100 TABLET ORAL DAILY
Qty: 90 TABLET | Refills: 1 | Status: SHIPPED | OUTPATIENT
Start: 2024-01-15

## 2024-01-17 ENCOUNTER — CONSULT (OUTPATIENT)
Dept: HEMATOLOGY ONCOLOGY | Facility: CLINIC | Age: 79
End: 2024-01-17
Payer: COMMERCIAL

## 2024-01-17 VITALS
TEMPERATURE: 97.6 F | BODY MASS INDEX: 26.09 KG/M2 | SYSTOLIC BLOOD PRESSURE: 122 MMHG | RESPIRATION RATE: 18 BRPM | OXYGEN SATURATION: 98 % | HEIGHT: 60 IN | HEART RATE: 82 BPM | DIASTOLIC BLOOD PRESSURE: 78 MMHG

## 2024-01-17 DIAGNOSIS — I12.0 HYPERTENSIVE CHRONIC KIDNEY DISEASE WITH STAGE 5 CHRONIC KIDNEY DISEASE OR END STAGE RENAL DISEASE (HCC): ICD-10-CM

## 2024-01-17 DIAGNOSIS — D47.2 MGUS (MONOCLONAL GAMMOPATHY OF UNKNOWN SIGNIFICANCE): Primary | ICD-10-CM

## 2024-01-17 DIAGNOSIS — R77.8 ABNORMAL SERUM PROTEIN ELECTROPHORESIS: ICD-10-CM

## 2024-01-17 PROCEDURE — 99204 OFFICE O/P NEW MOD 45 MIN: CPT | Performed by: INTERNAL MEDICINE

## 2024-01-17 NOTE — PROGRESS NOTES
Oncology Outpatient Consult Note  Anamaria Parnell 78 y.o. female MRN: @ Encounter: [id number]        Date:  1/17/2024        Assessment/ Plan:    1 question of MGUS  2 coronary artery disease  3 renal insufficiency CKD 3B  4 osteoarthritis  5 history of type 2 diabetes mellitus  Plan: We will have the patient undergo repeat protein levels she will get an SPEP serum immunoglobulins serum free light chains.  She will get a repeat CBC and serum levels as well she will return here for follow-up in 4 to 6 weeks time    Labs and imaging studies are reviewed by ordering provider once results are available. If there are findings that need immediate attention, you will be contacted when results available.   Discussing results and the implication on your healthcare is best discussed in person at your follow-up visit.       CC: Here for an evaluation with a history of a probable MGUS.      HPI:  Anamaria Parnell is seen for initial consultation 1/17/2024.  78-year-old female who in August as part of her workup for bone abnormalities had a SPEP which showed a monoclonal gammopathy.  It was not characterized further.  She had serum free light chains were negative.  Apparently she also has a mass in the lesser curvature of the stomach.  It was 2.9 x 2.4 x 3.1 soft tissue density.  She has no symptomatology from it.  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  He apparently refused and does not want to go.  Being that the lesion is over 2 cm in size with having this evaluated and done.  She was strongly encouraged to discuss with her family doctor and gastroenterology to consider going ahead and having further evaluation of this.  She has underlying history of osteoarthritis disease with low back pain right lower.  She does not have a definitive bone abnormalities.  She has not had repeat labs.  Her creatinine in 1/6/2024 was 1.39 which was an  "improvement over previous.  Her CBC shows normal white count normal platelet count hemoglobin 12.1 and hematocrit of 36.2      Cancer Staging:  Cancer Staging   No matching staging information was found for the patient.      Molecular Testing:     Previous Hematologic/ Oncologic History:    Oncology History    No history exists.           Test Results:    Imaging: .No results found.    Labs:   Lab Results   Component Value Date    WBC 8.18 01/06/2024    HGB 12.1 01/06/2024    HCT 36.2 01/06/2024    MCV 91 01/06/2024     01/06/2024     Lab Results   Component Value Date    K 4.3 01/06/2024     (H) 01/06/2024    CO2 24 01/06/2024    BUN 20 01/06/2024    CREATININE 1.39 (H) 01/06/2024    GLUCOSE 130 09/07/2023    GLUF 118 (H) 01/06/2024    CALCIUM 9.1 01/06/2024    CORRECTEDCA 10.1 01/22/2022    AST 11 (L) 01/06/2024    ALT 7 01/06/2024    ALKPHOS 111 (H) 01/06/2024    EGFR 36 01/06/2024         Lab Results   Component Value Date    SPEP See Comment 08/03/2023    UPEP See Comment 08/05/2023       No results found for: \"PSA\"    No results found for: \"CEA\"    No results found for: \"\"    No results found for: \"AFP\"    No results found for: \"IRON\", \"TIBC\", \"FERRITIN\"    Lab Results   Component Value Date    BRKMQFFR76 502 02/25/2019           ROS: Review of Systems   Constitutional: Negative.    HENT:  Negative.     Respiratory: Negative.     Cardiovascular: Negative.    Gastrointestinal: Negative.    Endocrine: Negative.    Genitourinary: Negative.     Musculoskeletal:  Positive for arthralgias.   Neurological: Negative.    Hematological: Negative.    All other systems reviewed and are negative.           Active Problems:   Patient Active Problem List   Diagnosis    Basilar artery stenosis    CVA (cerebral vascular accident) (HCC)    Chronic GERD    Acute renal failure superimposed on stage 3 chronic kidney disease (HCC)    Claudication in peripheral vascular disease (HCC)    Type 2 diabetes mellitus " with diabetic nephropathy (HCC)    Gout    Hx-TIA (transient ischemic attack)    Hypercholesteremia    Hyperlipidemia associated with type 2 diabetes mellitus     Hypertension    Hypertension associated with diabetes     Osteoarthritis    Polyneuropathy    Right renal artery stenosis (HCC)    Vertebrobasilar artery syndrome    Vitamin D deficiency    Statin intolerance    Lumbar disc herniation    Spondylosis of lumbar region without myelopathy or radiculopathy    Aortoiliac stenosis, left (HCC)    Lumbosacral spondylosis without myelopathy    Hyperlipidemia, unspecified    Herniated lumbar intervertebral disc    Atherosclerosis of renal artery (HCC)    Celiac artery stenosis (HCC) >70% stenosis in the celiac trunk    Abnormal CT of the chest suspicious for an infectious or inflammatory bronchiolitis.    Positive cardiac stress test  small, mildly severe, partially reversible myocardial perfusion defect of anterior and inferior wall     Femoral artery stenosis, right (HCC) 50-75% stenosis in the common femoral artery.    Mesenteric artery stenosis (HCC)    Median arcuate ligament syndrome (HCC)    Atherosclerosis of native arteries of extremities with rest pain, right leg (HCC)    Symptomatic carotid artery stenosis, left    Pulmonary nodule 7 mm groundglass opacity in the right upper lobe, unchanged since October 2019    Stenosis of left anterior descending artery Mid LAD 50-60% stenosis    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    Malignant neoplasm of overlapping sites of bladder (HCC)    Cervical radiculopathy    Stage 4 chronic kidney disease (HCC)    Hypertensive kidney disease with stage 4 chronic kidney disease (HCC)    Depression, recurrent (HCC)    Type 2 diabetes mellitus, without long-term current use of insulin (HCC)    Type 2 diabetes mellitus with diabetic peripheral angiopathy without gangrene (HCC)    Gastrointestinal stromal tumor (GIST) (HCC)     History of gastrointestinal stromal tumor (GIST)    Secondary hyperparathyroidism of renal origin (HCC)    Aortoiliac occlusive disease (HCC)    Hypertensive urgency    Dysarthria    Stage 3b chronic kidney disease (HCC)    Acute CVA (cerebrovascular accident) (HCC)    Primary hypertension    Smoking    Renal cyst    Encounter to discuss test results    Complex renal cyst    Encounter for follow-up surveillance of urothelial carcinoma of lower urinary tract    Carotid stenosis, asymptomatic, right    History of left common carotid artery stent placement    Hypertensive chronic kidney disease with stage 5 chronic kidney disease or end stage renal disease (HCC)       Past Medical History:   Past Medical History:   Diagnosis Date    Aphasia as late effect of cerebrovascular accident (CVA) 08/16/2023    Arthritis     Chronic kidney disease     Diabetes mellitus (HCC)     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/20/2021    Endometriosis     High cholesterol     History of left common carotid artery stent placement 10/27/2023    Hypertension     Kidney disease, chronic, stage III (moderate, EGFR 30-59 ml/min) (HCC)     Lumbar disc herniation     Neuropathy     Obesity (BMI 30-39.9) 12/04/2017    Obesity, morbid (HCC) 01/20/2021    Peripheral vascular disease (HCC)     Pneumonia     Shoulder injury     left    Spinal stenosis     Stroke (HCC) 2015    Memory loss       Surgical History:   Past Surgical History:   Procedure Laterality Date    CARDIAC CATHETERIZATION      CAROTID STENT Left 9/7/2023    Procedure: L TCAR;  Surgeon: Nicole Gallo MD;  [facility]: [facility];  Service: Vascular    COLONOSCOPY      FL RETROGRADE PYELOGRAM  4/6/2020    FRACTURE SURGERY Right     ankle    IR CAROTID STENT  9/7/2023    OVARIAN CYST SURGERY      TX CYSTO BLADDER W/URETERAL CATHETERIZATION Bilateral 4/6/2020    Procedure: CYSTOSCOPY WITH RETROGRADE PYELOGRAM;  Surgeon: Robert Mitchell MD;  Location:  [facility];  Service: Urology    KS CYSTO W/INSERT URETERAL STENT Right 4/6/2020    Procedure: INSERTION STENT URETERAL;  Surgeon: Robert Mitchell MD;  [facility];  Service: Urology    KS CYSTO W/REMOVAL OF TUMORS SMALL N/A 4/6/2020    Procedure: TRANSURETHRAL RESECTION OF BLADDER TUMOR (TURBT);  Surgeon: Robert Mitchell MD;  [facility];  Service: Urology    ROTATOR CUFF REPAIR Left     TONSILLECTOMY         Family History:    Family History   Problem Relation Age of Onset    Hypertension Mother     Heart disease Mother         Valvular    Hyperlipidemia Mother     Hypertension Father     Heart defect Father         Cardiomegaly    Stroke Sister         Cerebrovascular Accident    Arthritis Brother     Other Brother         Back Disorder       Cancer-related family history is not on file.    Social History:   Social History     Socioeconomic History    Marital status: /Civil Union     Spouse name: Not on file    Number of children: Not on file    Years of education: Not on file    Highest education level: Not on file   Occupational History    Occupation: retired   Tobacco Use    Smoking status: Former     Current packs/day: 0.00     Average packs/day: 2.0 packs/day for 54.6 years (109.1 ttl pk-yrs)     Types: Cigarettes     Start date: 1966     Quit date: 7/27/2020     Years since quitting: 3.4    Smokeless tobacco: Never   Vaping Use    Vaping status: Never Used   Substance and Sexual Activity    Alcohol use: Never    Drug use: Never    Sexual activity: Not Currently     Partners: Male   Other Topics Concern    Not on file   Social History Narrative    Occasional Caffeine Consumption     Social Determinants of Health     Financial Resource Strain: Not on file   Food Insecurity: No Food Insecurity (9/5/2023)    Hunger Vital Sign     Worried About Running Out of Food in the Last Year: Never true     Ran Out of Food in the Last Year: Never true   Transportation Needs: No Transportation  Needs (9/5/2023)    PRAPARE - Transportation     Lack of Transportation (Medical): No     Lack of Transportation (Non-Medical): No   Physical Activity: Not on file   Stress: Not on file   Social Connections: Not on file   Intimate Partner Violence: Not on file   Housing Stability: Low Risk  (9/5/2023)    Housing Stability Vital Sign     Unable to Pay for Housing in the Last Year: No     Number of Places Lived in the Last Year: 1     Unstable Housing in the Last Year: No       Current Medications:   Current Outpatient Medications   Medication Sig Dispense Refill    acetaminophen (TYLENOL) 500 mg tablet Take 650 mg by mouth every 6 (six) hours as needed for mild pain      allopurinol (ZYLOPRIM) 100 mg tablet TAKE 1 TABLET BY MOUTH EVERY DAY 90 tablet 2    amLODIPine (NORVASC) 5 mg tablet Take 1 tablet (5 mg total) by mouth daily Primary hypertension [I10] 90 tablet 0    aspirin 81 mg chewable tablet Chew 81 mg daily      B-D ULTRAFINE III SHORT PEN 31G X 8 MM MISC USE TWICE A  each 2    cinacalcet (SENSIPAR) 30 mg tablet Take 1 tablet (30 mg total) by mouth every other day 45 tablet 2    cloNIDine (CATAPRES-TTS-3) 0.3 mg/24 hr PLACE 1 PATCH (0.3 MG TOTAL) ON THE SKIN ONCE A WEEK 12 patch 1    escitalopram (LEXAPRO) 10 mg tablet Take 1 tablet (10 mg total) by mouth daily 90 tablet 0    Glucagon 1 MG/0.2ML SOAJ Inject 1 mg under the skin if needed (low blood sugar)      insulin glargine (LANTUS) 100 units/mL subcutaneous injection Inject 8 Units under the skin daily at bedtime 10 mL 0    insulin lispro (HumaLOG) 100 units/mL injection Inject under the skin      labetalol (NORMODYNE) 300 mg tablet Take 1 tablet (300 mg total) by mouth every 12 (twelve) hours  0    Lantus SoloStar 100 units/mL SOPN 9 UNITS SUBCUTANEOUS AT BEDTIME      lidocaine (LIDODERM) 5 % Apply 1 patch topically daily Remove & Discard patch within 12 hours or as directed by MD      losartan (COZAAR) 100 MG tablet Take 1 tablet (100 mg total) by  mouth daily 90 tablet 1    pravastatin (PRAVACHOL) 80 mg tablet Take 1 tablet (80 mg total) by mouth daily with dinner 30 tablet 0    traZODone (DESYREL) 50 mg tablet TAKE 1 TABLET BY MOUTH DAILY AT BEDTIME 90 tablet 1    Zinc Oxide, Topical, 16 & 40 % KIT Apply topically 2 (two) times a day Pressure Ulcer      ticagrelor (BRILINTA) 90 MG Take 1 tablet (90 mg total) by mouth every 12 (twelve) hours 60 tablet 5     No current facility-administered medications for this visit.       Allergies:   Allergies   Allergen Reactions    Fenofibrate Other (See Comments)      blood in urine  hx  Kidney Failure    Colesevelam Other (See Comments)      leg pains    Colestipol Itching and Other (See Comments)      Swelling lower legs    Ezetimibe GI Intolerance    Statins Myalgia         Physical Exam:    Body surface area is 1.57 meters squared.    Wt Readings from Last 3 Encounters:   01/08/24 60.6 kg (133 lb 9.6 oz)   11/21/23 64.9 kg (143 lb)   11/07/23 64.9 kg (143 lb)        Temp Readings from Last 3 Encounters:   01/17/24 97.6 °F (36.4 °C) (Temporal)   11/07/23 (!) 97.1 °F (36.2 °C) (Temporal)   09/09/23 97.6 °F (36.4 °C) (Oral)        BP Readings from Last 3 Encounters:   01/17/24 122/78   01/08/24 122/76   11/21/23 126/78         Pulse Readings from Last 3 Encounters:   01/17/24 82   01/08/24 65   11/21/23 69     @LASTSAO2(3)@    Physical Exam  Constitutional:       Appearance: Normal appearance. She is normal weight.   HENT:      Head: Normocephalic and atraumatic.   Eyes:      Extraocular Movements: Extraocular movements intact.      Conjunctiva/sclera: Conjunctivae normal.      Pupils: Pupils are equal, round, and reactive to light.   Cardiovascular:      Rate and Rhythm: Normal rate and regular rhythm.      Heart sounds: Normal heart sounds.   Pulmonary:      Effort: Pulmonary effort is normal.      Breath sounds: Normal breath sounds.   Abdominal:      General: Abdomen is flat. Bowel sounds are normal.       Palpations: Abdomen is soft.   Musculoskeletal:         General: Normal range of motion.      Cervical back: Normal range of motion and neck supple.   Skin:     General: Skin is warm and dry.   Neurological:      General: No focal deficit present.      Mental Status: She is alert and oriented to person, place, and time. Mental status is at baseline.     She does have limited mobility complaining of pain discomfort in her ankles.  She can move her arms and legs symmetrically though at this time.      Goals and Barriers:  Current Goal: Prolong Survival from Cancer.   Barriers: None.      Patient's Capacity to Self Care:  Patient is able to self care.      Emergency Contacts:    [unfilled], ,   *No Contact Specified*, ,     Code Status: @[facility]@  Advance Directive and Living Will:      Power of :    POLST:

## 2024-01-17 NOTE — TELEPHONE ENCOUNTER
How Severe Are They?: mild Patient's sensitivities came back  Please review and advise if appropriate abx was given   Thank you Is This A New Presentation, Or A Follow-Up?: Follow Up [facility] Additional History: Pt finished 2 PDT tx. \\nLast tx was 1/4/24

## 2024-01-19 DIAGNOSIS — I63.9 ACUTE CVA (CEREBROVASCULAR ACCIDENT) (HCC): ICD-10-CM

## 2024-01-19 RX ORDER — ESCITALOPRAM OXALATE 10 MG/1
10 TABLET ORAL DAILY
Qty: 90 TABLET | Refills: 0 | Status: SHIPPED | OUTPATIENT
Start: 2024-01-19 | End: 2024-04-18

## 2024-01-26 ENCOUNTER — POST-OP CHECK (OUTPATIENT)
Dept: URBAN - METROPOLITAN AREA CLINIC 6 | Facility: CLINIC | Age: 79
End: 2024-01-26

## 2024-01-26 DIAGNOSIS — H25.811: ICD-10-CM

## 2024-01-26 DIAGNOSIS — H43.813: ICD-10-CM

## 2024-01-26 DIAGNOSIS — Z79.4: ICD-10-CM

## 2024-01-26 DIAGNOSIS — H04.123: ICD-10-CM

## 2024-01-26 DIAGNOSIS — E11.3212: ICD-10-CM

## 2024-01-26 DIAGNOSIS — H26.492: ICD-10-CM

## 2024-01-26 DIAGNOSIS — Z96.1: ICD-10-CM

## 2024-01-26 PROCEDURE — 99024 POSTOP FOLLOW-UP VISIT: CPT

## 2024-01-26 PROCEDURE — 92015 DETERMINE REFRACTIVE STATE: CPT

## 2024-01-26 PROCEDURE — 92134 CPTRZ OPH DX IMG PST SGM RTA: CPT

## 2024-01-26 ASSESSMENT — TONOMETRY
OS_IOP_MMHG: 13
OD_IOP_MMHG: 11

## 2024-01-26 ASSESSMENT — KERATOMETRY
OS_AXISANGLE2_DEGREES: 103
OD_K1POWER_DIOPTERS: 43.75
OD_AXISANGLE_DEGREES: 174
OD_AXISANGLE2_DEGREES: 84
OD_K2POWER_DIOPTERS: 45.25
OS_AXISANGLE_DEGREES: 13
OS_K2POWER_DIOPTERS: 45.25
OS_K1POWER_DIOPTERS: 44.50

## 2024-01-26 ASSESSMENT — VISUAL ACUITY
OS_SC: 20/200
OD_SC: 20/200

## 2024-02-03 DIAGNOSIS — E83.52 HYPERCALCEMIA: ICD-10-CM

## 2024-02-03 DIAGNOSIS — N25.81 SECONDARY HYPERPARATHYROIDISM OF RENAL ORIGIN (HCC): ICD-10-CM

## 2024-02-03 DIAGNOSIS — N18.4 STAGE 4 CHRONIC KIDNEY DISEASE (HCC): ICD-10-CM

## 2024-02-05 RX ORDER — CINACALCET 30 MG/1
30 TABLET, FILM COATED ORAL EVERY OTHER DAY
Qty: 45 TABLET | Refills: 2 | Status: SHIPPED | OUTPATIENT
Start: 2024-02-05 | End: 2024-11-01

## 2024-02-07 ENCOUNTER — TELEPHONE (OUTPATIENT)
Dept: LAB | Facility: HOSPITAL | Age: 79
End: 2024-02-07

## 2024-02-07 DIAGNOSIS — I63.9 ACUTE CVA (CEREBROVASCULAR ACCIDENT) (HCC): ICD-10-CM

## 2024-02-07 RX ORDER — LABETALOL 300 MG/1
300 TABLET, FILM COATED ORAL EVERY 12 HOURS SCHEDULED
Qty: 180 TABLET | Refills: 1 | Status: SHIPPED | OUTPATIENT
Start: 2024-02-07

## 2024-02-07 NOTE — TELEPHONE ENCOUNTER
Asking for refills of this med,  wasn't given by us last time?     Possibly given by hospital at one point?     [facility] if possible

## 2024-02-09 ENCOUNTER — ESTABLISHED COMPREHENSIVE EXAM (OUTPATIENT)
Dept: URBAN - METROPOLITAN AREA CLINIC 6 | Facility: CLINIC | Age: 79
End: 2024-02-09

## 2024-02-09 DIAGNOSIS — H43.813: ICD-10-CM

## 2024-02-09 DIAGNOSIS — E11.3212: ICD-10-CM

## 2024-02-09 DIAGNOSIS — Z96.1: ICD-10-CM

## 2024-02-09 DIAGNOSIS — H25.811: ICD-10-CM

## 2024-02-09 DIAGNOSIS — H26.492: ICD-10-CM

## 2024-02-09 DIAGNOSIS — H04.123: ICD-10-CM

## 2024-02-09 DIAGNOSIS — Z79.4: ICD-10-CM

## 2024-02-09 PROCEDURE — 92014 COMPRE OPH EXAM EST PT 1/>: CPT

## 2024-02-09 PROCEDURE — 92202 OPSCPY EXTND ON/MAC DRAW: CPT

## 2024-02-09 PROCEDURE — 92134 CPTRZ OPH DX IMG PST SGM RTA: CPT

## 2024-02-09 ASSESSMENT — VISUAL ACUITY
OS_CC: 20/150
OD_CC: 20/50

## 2024-02-09 ASSESSMENT — TONOMETRY
OD_IOP_MMHG: 9
OS_IOP_MMHG: 10

## 2024-02-15 ENCOUNTER — APPOINTMENT (OUTPATIENT)
Dept: LAB | Facility: HOSPITAL | Age: 79
End: 2024-02-15
Attending: INTERNAL MEDICINE
Payer: COMMERCIAL

## 2024-02-15 DIAGNOSIS — R77.8 ELEVATED TOTAL PROTEIN: Primary | ICD-10-CM

## 2024-02-15 DIAGNOSIS — D47.2 MGUS (MONOCLONAL GAMMOPATHY OF UNKNOWN SIGNIFICANCE): ICD-10-CM

## 2024-02-15 DIAGNOSIS — I12.0 HYPERTENSIVE CHRONIC KIDNEY DISEASE WITH STAGE 5 CHRONIC KIDNEY DISEASE OR END STAGE RENAL DISEASE (HCC): ICD-10-CM

## 2024-02-15 DIAGNOSIS — R77.8 ABNORMAL SERUM PROTEIN ELECTROPHORESIS: ICD-10-CM

## 2024-02-15 LAB
25(OH)D3 SERPL-MCNC: 28.6 NG/ML (ref 30–100)
ALBUMIN SERPL BCP-MCNC: 4 G/DL (ref 3.5–5)
ALP SERPL-CCNC: 96 U/L (ref 34–104)
ALT SERPL W P-5'-P-CCNC: 9 U/L (ref 7–52)
ANION GAP SERPL CALCULATED.3IONS-SCNC: 11 MMOL/L
AST SERPL W P-5'-P-CCNC: 10 U/L (ref 13–39)
BASOPHILS # BLD AUTO: 0.06 THOUSANDS/ÂΜL (ref 0–0.1)
BASOPHILS NFR BLD AUTO: 1 % (ref 0–1)
BILIRUB DIRECT SERPL-MCNC: 0.18 MG/DL (ref 0–0.2)
BILIRUB SERPL-MCNC: 0.5 MG/DL (ref 0.2–1)
BUN SERPL-MCNC: 26 MG/DL (ref 5–25)
CALCIUM SERPL-MCNC: 9 MG/DL (ref 8.4–10.2)
CHLORIDE SERPL-SCNC: 106 MMOL/L (ref 96–108)
CO2 SERPL-SCNC: 24 MMOL/L (ref 21–32)
CREAT SERPL-MCNC: 1.61 MG/DL (ref 0.6–1.3)
EOSINOPHIL # BLD AUTO: 0.31 THOUSAND/ÂΜL (ref 0–0.61)
EOSINOPHIL NFR BLD AUTO: 4 % (ref 0–6)
ERYTHROCYTE [DISTWIDTH] IN BLOOD BY AUTOMATED COUNT: 14.5 % (ref 11.6–15.1)
GFR SERPL CREATININE-BSD FRML MDRD: 30 ML/MIN/1.73SQ M
GLUCOSE P FAST SERPL-MCNC: 95 MG/DL (ref 65–99)
HCT VFR BLD AUTO: 37.9 % (ref 34.8–46.1)
HGB BLD-MCNC: 12.4 G/DL (ref 11.5–15.4)
IGA SERPL-MCNC: 156 MG/DL (ref 66–433)
IGG SERPL-MCNC: 763 MG/DL (ref 635–1741)
IGM SERPL-MCNC: 672 MG/DL (ref 45–281)
IMM GRANULOCYTES # BLD AUTO: 0.04 THOUSAND/UL (ref 0–0.2)
IMM GRANULOCYTES NFR BLD AUTO: 1 % (ref 0–2)
LYMPHOCYTES # BLD AUTO: 1.04 THOUSANDS/ÂΜL (ref 0.6–4.47)
LYMPHOCYTES NFR BLD AUTO: 15 % (ref 14–44)
MCH RBC QN AUTO: 30.4 PG (ref 26.8–34.3)
MCHC RBC AUTO-ENTMCNC: 32.7 G/DL (ref 31.4–37.4)
MCV RBC AUTO: 93 FL (ref 82–98)
MONOCYTES # BLD AUTO: 0.49 THOUSAND/ÂΜL (ref 0.17–1.22)
MONOCYTES NFR BLD AUTO: 7 % (ref 4–12)
NEUTROPHILS # BLD AUTO: 5.19 THOUSANDS/ÂΜL (ref 1.85–7.62)
NEUTS SEG NFR BLD AUTO: 72 % (ref 43–75)
NRBC BLD AUTO-RTO: 0 /100 WBCS
PLATELET # BLD AUTO: 278 THOUSANDS/UL (ref 149–390)
PMV BLD AUTO: 11.1 FL (ref 8.9–12.7)
POTASSIUM SERPL-SCNC: 4.1 MMOL/L (ref 3.5–5.3)
PROT SERPL-MCNC: 7 G/DL (ref 6.4–8.4)
PTH-INTACT SERPL-MCNC: 88.4 PG/ML (ref 12–88)
RBC # BLD AUTO: 4.08 MILLION/UL (ref 3.81–5.12)
SODIUM SERPL-SCNC: 141 MMOL/L (ref 135–147)
URATE SERPL-MCNC: 5.5 MG/DL (ref 2–7.5)
WBC # BLD AUTO: 7.13 THOUSAND/UL (ref 4.31–10.16)

## 2024-02-15 PROCEDURE — 36415 COLL VENOUS BLD VENIPUNCTURE: CPT

## 2024-02-15 PROCEDURE — 84550 ASSAY OF BLOOD/URIC ACID: CPT

## 2024-02-15 PROCEDURE — 80076 HEPATIC FUNCTION PANEL: CPT

## 2024-02-15 PROCEDURE — 84165 PROTEIN E-PHORESIS SERUM: CPT

## 2024-02-15 PROCEDURE — 83521 IG LIGHT CHAINS FREE EACH: CPT

## 2024-02-15 PROCEDURE — 82306 VITAMIN D 25 HYDROXY: CPT

## 2024-02-15 PROCEDURE — 83970 ASSAY OF PARATHORMONE: CPT

## 2024-02-15 PROCEDURE — 82784 ASSAY IGA/IGD/IGG/IGM EACH: CPT

## 2024-02-15 PROCEDURE — 85025 COMPLETE CBC W/AUTO DIFF WBC: CPT

## 2024-02-15 PROCEDURE — 80048 BASIC METABOLIC PNL TOTAL CA: CPT

## 2024-02-16 LAB
KAPPA LC FREE SER-MCNC: 45.5 MG/L (ref 3.3–19.4)
KAPPA LC FREE/LAMBDA FREE SER: 1.63 {RATIO} (ref 0.26–1.65)
LAMBDA LC FREE SERPL-MCNC: 28 MG/L (ref 5.7–26.3)

## 2024-02-17 LAB
ALBUMIN SERPL ELPH-MCNC: 3.89 G/DL (ref 3.2–5.1)
ALBUMIN SERPL ELPH-MCNC: 59 % (ref 48–70)
ALPHA1 GLOB SERPL ELPH-MCNC: 0.28 G/DL (ref 0.15–0.47)
ALPHA1 GLOB SERPL ELPH-MCNC: 4.2 % (ref 1.8–7)
ALPHA2 GLOB SERPL ELPH-MCNC: 0.69 G/DL (ref 0.42–1.04)
ALPHA2 GLOB SERPL ELPH-MCNC: 10.4 % (ref 5.9–14.9)
BETA GLOB ABNORMAL SERPL ELPH-MCNC: 0.38 G/DL (ref 0.31–0.57)
BETA1 GLOB SERPL ELPH-MCNC: 5.8 % (ref 4.7–7.7)
BETA2 GLOB SERPL ELPH-MCNC: 5.2 % (ref 3.1–7.9)
BETA2+GAMMA GLOB SERPL ELPH-MCNC: 0.34 G/DL (ref 0.2–0.58)
GAMMA GLOB ABNORMAL SERPL ELPH-MCNC: 1.02 G/DL (ref 0.4–1.66)
GAMMA GLOB SERPL ELPH-MCNC: 15.4 % (ref 6.9–22.3)
IGG/ALB SER: 1.44 {RATIO} (ref 1.1–1.8)
PROT PATTERN SERPL ELPH-IMP: NORMAL
PROT SERPL-MCNC: 6.6 G/DL (ref 6.4–8.2)

## 2024-02-17 PROCEDURE — 84165 PROTEIN E-PHORESIS SERUM: CPT | Performed by: PATHOLOGY

## 2024-02-19 ENCOUNTER — TELEPHONE (OUTPATIENT)
Dept: HEMATOLOGY ONCOLOGY | Facility: CLINIC | Age: 79
End: 2024-02-19

## 2024-02-19 NOTE — TELEPHONE ENCOUNTER
Appointment Change  Cancel, Reschedule, Change to Virtual      Who are you speaking with? Patient   If it is not the patient, is the caller listed on the communication consent form? Yes   Which provider is the appointment scheduled with? Dr Cruz   When was the original appointment scheduled?    Please list date and time 2/19/24   At which location is the appointment scheduled to take place? [facility]   Was the appointment rescheduled?     Was the appointment changed from an in person visit to a virtual visit?    If so, please list the details of the change. 3/27/24   What is the reason for the appointment change? Not feeling well        Was STAR transport scheduled? No   Does STAR transport need to be scheduled for the new visit (if applicable) No   Does the patient need an infusion appointment rescheduled? No   Does the patient have an upcoming infusion appointment scheduled? If so, when? No   Is the patient undergoing chemotherapy? No   For appointments cancelled with less than 24 hours:  Was the no-show policy reviewed? Yes          Reason For Visit  MARICARMEN SHAIKH is a(n) 89 year old established patient here today for follow-up management of . Pt is here for a follow up from last visit. He is accompanied by no one.        Referred By    Dr. Shantel Lilly      [facility]    Adult Wellness CI height documented, not using alcohol and no tobacco use.      History of Present Illness  pt has bloating and has wt 125 and waist 36. like that most of life/   pt having abd pain and has had for yrs and not new. his wt is bout same for past yr. has good appetite. abd pain seems to worsen and more diarrhea. he is on a diet for 30 yrs and ? need to change  he uses gas x and using peptobismol 1 tbsp daily and tums daily  does not have heartburn. has new drug omeprazole and not start yet  pain can be 6/10 and may sweat with the pain.   no blood in stool and has diarrhea 2/day. and not new but slight worse. can have solid bm 2/wk.      Review of Systems  refer to the 11 point review of systems form completed by patient that we reviewed together and is scanned into the EMR         Current Meds   1. Accu-Chek Compact Plus In Vitro Strip; TEST BLOOD SUGAR TWO TIMES A DAY;   Therapy: 09Oct2014 to (Evaluate:84Jsj4943)  Requested for: 13Cse8387; Last   Rx:[id number]; Status: ACTIVE - Transmit to Pharmacy - Awaiting Verification Ordered   2. Accu-Chek Compact STRP; TEST TWO TIMES A DAY AS DIRECTED;   Therapy: 98Mpj8595 to (Evaluate:20Jan2014)  Requested for: 98Ynl1137; Last   Rx:[id number] Ordered   3. Accu-Chek Softclix Lancets; CHECK BLOOD SUGAR EVERY DAY;   Therapy: 21Jan2009 to (Evaluate:71Obg7119)  Requested for: 32Idj4493; Last   Rx:[id number] Ordered   4. ALPRAZolam 0.5 MG Oral Tablet; TAKE ONE TABLET BY MOUTH AT BEDTIME AS   NEEDED;   Therapy: 21Jan2009 to (Evaluate:51Ddw4843)  Requested for: 07Tkb1374; Last   Rx:[id number] Ordered   5. Furosemide 40 MG Oral Tablet; TAKE 1 TABLET BY MOUTH DAILY;   Therapy: 08Oct2013 to (Evaluate:63Tqc3850)  Requested for: 67Qyl9853;  Last   Rx:[id number] Ordered   6. GlipiZIDE ER 2.5 MG Oral Tablet Extended Release 24 Hour; take one tablet by mouth   one time daily;   Therapy: 26Zsz9573 to (Evaluate:25Suc6649)  Requested for: 24Fwe9592; Last   Rx:[id number] Ordered   7. Klor-Con 10 10 MEQ Oral Tablet Extended Release; TAKE ONE TABLET BY MOUTH   EVERY DAY AS NEEDED;   Therapy: 08Oct2013 to (Evaluate:50Wcb9733)  Requested for: 09Ldq2816; Last   Rx:[id number]; Status: ACTIVE - Transmit to Pharmacy - Awaiting Verification Ordered   8. Latanoprost 0.005 % Ophthalmic Solution; INSTILL 1 DROP IN BOTH EYES EVERY   NIGHT AT BEDTIME;   Therapy: 88Ikb6145 to (Evaluate:13Jun2014)  Requested for: 14Jan2014; Last   Rx:[id number] Ordered   9. Omeprazole 40 MG Oral Capsule Delayed Release; TAKE 1 CAPSULE BY MOUTH   DAILY;   Therapy: 22Oct2014 to (Evaluate:16Zok2675)  Requested for: 29Oct2018; Last   Rx:[id number] Ordered   10. Timolol Maleate 0.5 % Ophthalmic Solution; INSTILL 1 DROP INTO BOTH EYES ONCE    DAILY;    Therapy: 23Jun2009 to (Evaluate:46Wvh2376)  Requested for: 10Nov2016; Last    Rx:[id number] Ordered    Active Problems  Abdominal pain of multiple sites (R10.9)  Abnormal weight loss (R63.4)  Acid reflux (K21.9)  Allergic rhinitis (J30.9)  Anxiety (F41.9)  Arthritis of lumbar spine (M47.816)  Asymptomatic varicose veins (I83.90)  Benign enlargement of prostate (N40.0)  Benign hypertensive kidney disease (I12.9)  Bilateral hydrocele (N43.3)  Bilateral leg edema (R60.0)  Chest pain (R07.9)  Chronic antral gastritis (K29.50)  Diabetic glaucoma (E11.39,H42)  Diabetic nephropathy (E11.21)  Diabetic peripheral neuropathy (E11.42)  Diverticulosis (K57.90)  Dyslipidemia (E78.5)  Encounter for general health examination (Z00.00)  Glaucoma (H40.9)  Hiatal hernia (K44.9)  Hypoalbuminemia due to protein-calorie malnutrition (E46)  Inguinal hernia (K40.90)  Memory loss (R41.3)  Need for immunization against influenza (Z23)  Need for influenza vaccination (Z23)  Need  for pneumococcal vaccination (Z23)  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)  Stage 2 chronic kidney disease (N18.2)  Syncope, unspecified syncope type (R55)    Past Medical History  History of Abdominal tenderness, right upper quadrant (R10.811)  History of Abnormal blood chemistry (R79.9)  History of Ankle Joint Pain  History of Bloating (R14.0)  History of Closed Fracture Of The Left Distal Fibula  History of Complex Bladder Cystometrogram  History of abdominal pain (Z87.898)  History of chronic otitis media (Z86.69)  History of edema (Z87.898)  History of Hydrocele of testis (N43.3)  History of Ileus (K56.7)  History of Leg swelling (M79.89)  Need for immunization against influenza (Z23)  Need for pneumococcal vaccination (Z23)  History of Pre-operative examination (Z01.818)  History of Testicular swelling (N50.89)    Surgical History  History of Transurethral Resection Of Prostate (TURP)   1992    Family History  Family history of Reported Family History Of Cancer    Social History  Former smoker (Z87.891)  Never a smoker  Never Drank Alcohol  Never smoker    Vitals  Vital Signs    Recorded: 05Nov2018 09:40AM   Height 5 ft 5 in   Weight 127 lb 13.92 oz   BMI Calculated 21.28   BSA Calculated 1.64   Systolic 114   Diastolic 73   Heart Rate 79     Physical Exam  Constitutional: alert, in no acute distress and current vital signs reviewed.   ENT: normal appearing outer ear. normal lips. oral mucosa pink and moist.   Pulmonary: no respiratory distress and normal respiratory rate and effort. breath sounds clear to auscultation bilaterally.   Cardiovascular: normal rate, no murmurs were heard and regular rhythm.   Abdomen: soft, nondistended and no abdominal mass . right inquinal hernia at scar and easy reducible and size of golf ball. mild periumb tender.   Musculoskeletal: normal gait.   Neurologic: cranial nerves grossly intact and the peripheral nerves grossly intact.   Psychiatric: alert and awake,  interactive and mood/affect were appropriate.      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 sx could be r/t the pneumatosis or even partial sbo but pt does not want surgery  will give trial on probiotic and check abd xrays   consider ct enterography if worsens.   stay on ensure daily and glucose seems stable around 80  trying to avoid surgery but if hernia worsens may need redo  can consider antibiotic therapy based on xray results and sx   will consider metronidazole or ampicillin for up to 3 mos and also elemental type diet for 2 wks       Signatures   Electronically signed by : Trang Guerra CMA; Nov 5 2018  9:45AM CST    Electronically signed by : CHE NEWTON MD; Nov 5 2018  1:59PM CST

## 2024-02-20 ENCOUNTER — TELEMEDICINE (OUTPATIENT)
Dept: VASCULAR SURGERY | Facility: CLINIC | Age: 79
End: 2024-02-20
Payer: COMMERCIAL

## 2024-02-20 VITALS — BODY MASS INDEX: 26.09 KG/M2 | HEIGHT: 60 IN

## 2024-02-20 DIAGNOSIS — I70.1 ATHEROSCLEROSIS OF RENAL ARTERY (HCC): ICD-10-CM

## 2024-02-20 DIAGNOSIS — I77.4 MEDIAN ARCUATE LIGAMENT SYNDROME (HCC): ICD-10-CM

## 2024-02-20 DIAGNOSIS — I63.9 CEREBROVASCULAR ACCIDENT (CVA), UNSPECIFIED MECHANISM (HCC): ICD-10-CM

## 2024-02-20 DIAGNOSIS — I70.201 FEMORAL ARTERY STENOSIS, RIGHT (HCC): ICD-10-CM

## 2024-02-20 DIAGNOSIS — I65.22 SYMPTOMATIC CAROTID ARTERY STENOSIS, LEFT: ICD-10-CM

## 2024-02-20 DIAGNOSIS — I70.1 RIGHT RENAL ARTERY STENOSIS (HCC): ICD-10-CM

## 2024-02-20 DIAGNOSIS — I70.221 ATHEROSCLEROSIS OF NATIVE ARTERIES OF EXTREMITIES WITH REST PAIN, RIGHT LEG (HCC): Primary | ICD-10-CM

## 2024-02-20 DIAGNOSIS — I65.21 CAROTID STENOSIS, ASYMPTOMATIC, RIGHT: ICD-10-CM

## 2024-02-20 PROCEDURE — 99443 PR PHYS/QHP TELEPHONE EVALUATION 21-30 MIN: CPT | Performed by: SURGERY

## 2024-02-20 RX ORDER — PREDNISOLONE ACETATE 10 MG/ML
SUSPENSION/ DROPS OPHTHALMIC
COMMUNITY
Start: 2024-02-14

## 2024-02-20 NOTE — ASSESSMENT & PLAN NOTE
Calcified aorta, CT scan noncontrast from 2021 was reviewed.  No intervention recommended at this time.

## 2024-02-20 NOTE — ASSESSMENT & PLAN NOTE
High-grade asymptomatic right carotid stenosis, continue to monitor and managed with medical treatment with Pravachol and Brilinta.  Do not recommend intervention as this is high risk.

## 2024-02-20 NOTE — ASSESSMENT & PLAN NOTE
Does have right lower extremity symptoms, chronic short distance claudication.  No ischemic rest pain or wounds.  We had a long discussion regarding risks and benefits of extensive procedures such as femoral endarterectomy and iliac stenting versus continued observation.  Based on her age and kidney disease she is a very high risk candidate and so at this point recommend to continue with medical management with antiplatelet and statin therapy.

## 2024-02-20 NOTE — PROGRESS NOTES
Virtual Brief Visit    This Visit is being completed by telephone. The Patient is located at Home and in the following state in which I hold an active license [place]    The patient was identified by name and date of birth. Anamaria Parnell was informed that this is a telemedicine visit and that the visit is being conducted through Telephone.  My office door was closed. No one else was in the room.  She acknowledged consent and understanding of privacy and security of the video platform. The patient has agreed to participate and understands they can discontinue the visit at any time.    Patient is aware this is a billable service.       Assessment/Plan:    Problem List Items Addressed This Visit        Cardiovascular and Mediastinum    CVA (cerebral vascular accident) (HCC)    Right renal artery stenosis (HCC)     Calcified aorta, CT scan noncontrast from 2021 was reviewed.  No intervention recommended at this time.         Femoral artery stenosis, right (HCC) 50-75% stenosis in the common femoral artery.     Does have right lower extremity symptoms, chronic short distance claudication.  No ischemic rest pain or wounds.  We had a long discussion regarding risks and benefits of extensive procedures such as femoral endarterectomy and iliac stenting versus continued observation.  Based on her age and kidney disease she is a very high risk candidate and so at this point recommend to continue with medical management with antiplatelet and statin therapy.         Relevant Orders    VAS ARTERIAL DUPLEX- LOWER LIMB BILATERAL    Median arcuate ligament syndrome (HCC)     No weight loss, no intervention recommended         Atherosclerosis of native arteries of extremities with rest pain, right leg (HCC) - Primary     Noncontrast CAT scan imaging from 2021 was reviewed, arterial Dopplers from recent past were reviewed and my interpretation is as follows, there is heavily calcified near occlusive plaque in the common femoral  bifurcation on the right.  Additionally there is occlusion of the right SFA in its midportion, there is heavily calcified possible occlusive plaque in the right common iliac artery.  [place] procedure would be a right femoral endarterectomy with iliac stenting.  She is a high risk candidate due to large groin pannus, chronic kidney disease stage IV.      Discussed in detail with the patient and her son, as she has had some improvement and is not having any ischemic rest pain or open wounds we will continue with current course of nonoperative management.  If situation worsens in the future she will let us know.  I will see her back in 6 months.         Relevant Orders    VAS ARTERIAL DUPLEX- LOWER LIMB BILATERAL    Symptomatic carotid artery stenosis, left     Treated with left TCAR stent with excellent results.  No new recurrent symptoms.  She is on long-term Brilinta.  Last carotid Doppler was reviewed and it was widely patent.  Repeat Doppler for 6 months from the last 1.         Carotid stenosis, asymptomatic, right     High-grade asymptomatic right carotid stenosis, continue to monitor and managed with medical treatment with Pravachol and Brilinta.  Do not recommend intervention as this is high risk.        Other Visit Diagnoses     Atherosclerosis of renal artery (HCC)        Relevant Orders    VAS ARTERIAL DUPLEX- LOWER LIMB BILATERAL          Recent Visits  No visits were found meeting these conditions.  Showing recent visits within past 7 days and meeting all other requirements  Today's Visits  Date Type Provider Dept   02/20/24 Telemedicine Nicole Gallo MD [facility]   Showing today's visits and meeting all other requirements  Future Appointments  No visits were found meeting these conditions.  Showing future appointments within next 150 days and meeting all other requirements         Visit Time  Total Visit Duration:30 min

## 2024-02-20 NOTE — ASSESSMENT & PLAN NOTE
Treated with left TCAR stent with excellent results.  No new recurrent symptoms.  She is on long-term Brilinta.  Last carotid Doppler was reviewed and it was widely patent.  Repeat Doppler for 6 months from the last 1.

## 2024-02-20 NOTE — ASSESSMENT & PLAN NOTE
Noncontrast CAT scan imaging from 2021 was reviewed, arterial Dopplers from recent past were reviewed and my interpretation is as follows, there is heavily calcified near occlusive plaque in the common femoral bifurcation on the right.  Additionally there is occlusion of the right SFA in its midportion, there is heavily calcified possible occlusive plaque in the right common iliac artery.  Dawn procedure would be a right femoral endarterectomy with iliac stenting.  She is a high risk candidate due to large groin pannus, chronic kidney disease stage IV.      Discussed in detail with the patient and her son, as she has had some improvement and is not having any ischemic rest pain or open wounds we will continue with current course of nonoperative management.  If situation worsens in the future she will let us know.  I will see her back in 6 months.

## 2024-02-26 NOTE — PROGRESS NOTES
NEPHROLOGY OFFICE NOTE    Patient: Anamaria Parnell               Sex: female           YOB: 1945        Age:  78 y.o.       2/28/2024      BACKGROUND     I had the pleasure of seeing Anamaria Parnell in the nephrology office today for follow-up.  She has a past medical history significant for GERD, mesenteric artery stenosis, type 2 diabetes with diabetic nephropathy, type 2 diabetes, hypertension, renal artery stenosis, prior CVA, CKD stage IV, complex renal cyst, vitamin D deficiency, and hyperlipidemia.     He was hospitalized at [facility] from 9/3/23 to 9/9/23 presenting with onset of right upper and lower extremity weakness while at skilled nursing facility and found to have acute CVA.  She was followed by neurology and actively maintained on aspirin, Brilinta, and pravastatin.     She has underlying symptomatic ICA stenosis and underwent left TCAR on 9/7/2023 during hospitalization, felt to be etiology of above stroke.     Currently follows with Dr. NAYE Longoria for CKD management and was last seen in the office on 8/7/2023.  She has underlying CKD stage IV, after review of the medical record it does appear that her baseline creatinine fluctuates around 1.5-1.8.  She has a known cystic lesion in the right kidney that was present on renal ultrasound, advised follow-up with contrast-enhanced CT scan or MRI using renal mass protocol once renal function stable.     He has underlying hypertension, actively maintained on amlodipine, clonidine patch, losartan, and labetalol.  She underwent renal artery Doppler on 1/7/2020 and was found to have a greater than 60% narrowing of the right main renal artery with left side difficult to evaluate.    She has atherosclerosis of the right lower extremity and following closely with Dr. Gallo. She reports improvement in her symptoms with conservative management. Surgery has been discussed with her, but she and her son report that it was told she would  be very high risk.     She presents with her son for follow-up visit today.  She reports that she has been overall doing well since her last office visit.  She still having some discomfort in her right foot.    Most recent labs were obtained on 2/15/2024, which we have reviewed together.     SUBJECTIVE     She currently has no major complaints at this time and is feeling well. Patient denies any chest pain, shortness of breath, or swelling.    REVIEW OF SYSTEMS     Review of Systems   Constitutional:  Positive for activity change. Negative for chills, fatigue and fever.   HENT:  Negative for trouble swallowing.    Respiratory:  Negative for shortness of breath.    Cardiovascular:  Negative for leg swelling.   Gastrointestinal:  Negative for nausea and vomiting.   Genitourinary:  Negative for difficulty urinating, dysuria, frequency and hematuria.   Musculoskeletal:  Negative for back pain.   Skin:  Negative for pallor.        Redness, pain to right foot   Neurological:  Negative for dizziness, syncope, weakness and light-headedness.   Psychiatric/Behavioral:  Negative for sleep disturbance. The patient is not nervous/anxious.        OBJECTIVE     Current Weight: Weight - Scale: 62.6 kg (138 lb)  Vitals:    02/28/24 1334   BP: 154/90   Pulse: 69   Resp: 16   Temp: (!) 96.9 °F (36.1 °C)   SpO2: 98%     Body mass index is 26.95 kg/m².    CURRENT MEDICATIONS       Current Outpatient Medications:     acetaminophen (TYLENOL) 500 mg tablet, Take 650 mg by mouth every 6 (six) hours as needed for mild pain, Disp: , Rfl:     allopurinol (ZYLOPRIM) 100 mg tablet, TAKE 1 TABLET BY MOUTH EVERY DAY, Disp: 90 tablet, Rfl: 2    amLODIPine (NORVASC) 5 mg tablet, Take 1 tablet (5 mg total) by mouth daily Primary hypertension [I10], Disp: 90 tablet, Rfl: 0    aspirin 81 mg chewable tablet, Chew 81 mg daily, Disp: , Rfl:     B-D ULTRAFINE III SHORT PEN 31G X 8 MM MISC, USE TWICE A DAY, Disp: 180 each, Rfl: 2    cinacalcet (SENSIPAR) 30  mg tablet, TAKE 1 TABLET (30 MG TOTAL) BY MOUTH EVERY OTHER DAY, Disp: 45 tablet, Rfl: 2    cloNIDine (CATAPRES-TTS-3) 0.3 mg/24 hr, PLACE 1 PATCH (0.3 MG TOTAL) ON THE SKIN ONCE A WEEK, Disp: 12 patch, Rfl: 1    escitalopram (LEXAPRO) 10 mg tablet, Take 1 tablet (10 mg total) by mouth daily, Disp: 90 tablet, Rfl: 0    Glucagon 1 MG/0.2ML SOAJ, Inject 1 mg under the skin if needed (low blood sugar), Disp: , Rfl:     insulin glargine (LANTUS) 100 units/mL subcutaneous injection, Inject 8 Units under the skin daily at bedtime, Disp: 10 mL, Rfl: 0    insulin lispro (HumaLOG) 100 units/mL injection, Inject under the skin, Disp: , Rfl:     labetalol (NORMODYNE) 300 mg tablet, Take 1 tablet (300 mg total) by mouth every 12 (twelve) hours, Disp: 180 tablet, Rfl: 1    Lantus SoloStar 100 units/mL SOPN, 9 UNITS SUBCUTANEOUS AT BEDTIME, Disp: , Rfl:     lidocaine (LIDODERM) 5 %, Apply 1 patch topically daily Remove & Discard patch within 12 hours or as directed by MD, Disp: , Rfl:     losartan (COZAAR) 100 MG tablet, Take 1 tablet (100 mg total) by mouth daily, Disp: 90 tablet, Rfl: 1    pravastatin (PRAVACHOL) 80 mg tablet, Take 1 tablet (80 mg total) by mouth daily with dinner, Disp: 30 tablet, Rfl: 0    prednisoLONE acetate (PRED FORTE) 1 % ophthalmic suspension, , Disp: , Rfl:     ticagrelor (BRILINTA) 90 MG, Take 1 tablet (90 mg total) by mouth every 12 (twelve) hours, Disp: 60 tablet, Rfl: 5    traZODone (DESYREL) 50 mg tablet, TAKE 1 TABLET BY MOUTH DAILY AT BEDTIME, Disp: 90 tablet, Rfl: 1    PHYSICAL EXAMINATION     Physical Exam  Vitals reviewed.   Constitutional:       General: She is not in acute distress.  HENT:      Head: Normocephalic.      Mouth/Throat:      Lips: Pink.      Mouth: Mucous membranes are moist.   Eyes:      General: Lids are normal. No scleral icterus.  Cardiovascular:      Rate and Rhythm: Normal rate and regular rhythm.      Heart sounds: S1 normal and S2 normal.   Pulmonary:      Effort:  Pulmonary effort is normal. No accessory muscle usage or respiratory distress.      Breath sounds: Normal breath sounds.   Abdominal:      General: There is no distension.      Tenderness: There is no abdominal tenderness.   Musculoskeletal:      Cervical back: Normal range of motion and neck supple. No tenderness.      Right lower leg: No edema.      Left lower leg: No edema.   Skin:     General: Skin is warm.      Coloration: Skin is not cyanotic or jaundiced.   Neurological:      General: No focal deficit present.      Mental Status: She is alert and oriented to person, place, and time.   Psychiatric:         Attention and Perception: Attention normal.         Speech: Speech normal.         Behavior: Behavior is cooperative.           LAB RESULTS     [facility] 2/15/2024:  Sodium 141  Potassium 4.1  Chloride 106  CO2 24  Anion Gap 11  BUN 26  Creatinine 1.61  Glucose 95  Calcium 9.0  eGFR 30      RADIOLOGY RESULTS      Results for orders placed during the hospital encounter of 01/22/22    XR chest portable    Narrative  CHEST    INDICATION:   cough. Patient has confirmed COVID-19.    COMPARISON:  None    EXAM PERFORMED/VIEWS:  XR CHEST PORTABLE      FINDINGS:    Cardiomediastinal silhouette appears unremarkable.    Mild bilateral infiltrates.  No pneumothorax or pleural effusion.    Osseous structures appear within normal limits for patient age.    Impression  Bilateral infiltrates compatible with confirmed COVID 19 infection.            Workstation performed: [id number]    Ultrasound of the kidney and bladder 9/6/2023:  Knee symmetric and normal in size.  Right kidney with a 1.4 x 1.7 x 1.5 cm cyst.  Cystic lesion in the right kidney which appears to have thickened, perceptible wall.  Recommend further evaluation of this finding with either contrast-enhanced CT or MRI using renal mass protocol on patient's renal function recovers.      ASSESSMENT/PLAN     Chronic kidney disease stage IV:  Etiology of CKD multifactorial  and suspected secondary to hypertensive nephrosclerosis, atrophic left kidney, and age-related nephron loss.      After review of the medical record, it appears that current creatinine levels have been fluctuating from 1.6-1.9.      Her office visit with Dr. NAYE Longoria on 8/7/2023, dialysis was discussed in length with the patient.  At that time, she stated that she would not like to pursue any form of dialysis even though can be a life-saving procedure for progressive chronic kidney disease.  We had a detailed discussion regarding her CKD at the appointment today and she confirmed to me that she does not want to pursue any form of dialysis.  I had an ACP visit today with her son, please see separate note.    Current creatinine level 1.61 with an eGFR of 30 and stable within baseline. Advise that she stay hydrated and avoid nephrotoxic agents such as NSAIDs.      Complex renal cyst:  On most recent renal ultrasound in September 2023, right kidney with a 1.4 x 1.7 x 1.5 cm cyst which appears to have a thickened perceivable wall.  Patient was recommended to undergo further evaluation with contrast-enhanced CT or MRI using renal mass protocol. In light of CKD stage 4 and eGFR < 30, it was not recommend for her to undergo MRI with gadolinium at that time.    Discussed renal optimization measures with contrast CT imaging to reduce risk of RICARDO, however, patient would still be at elevated risk given current level of renal function and prior episodes of DAHLIA.  After our discussion, she not want any imaging with IV contrast.    Discussed that if her eGFR remains stable at above 30 on follow-up labs, we can consider if MRI with gadolinium for further evaluation.  She reports that she is willing to consider MRI. We will repeat BMP in 2 months to determine if testing can be safely ordered.     Hypertension:  Currently maintained on amlodipine 5 mg daily, clonidine patch 0.3 mg weekly, labetalol 300 mg twice daily, and losartan 100  "mg daily.  Blood pressure appears to be borderline on visit today.  Patient's son reports that they are not monitoring blood pressures at home and they will resume home blood pressure monitoring.     Undergone renal artery Doppler January 2020 which was shown to have greater than 60% stenosis of the right main renal artery currently undergoing medical management and blood pressure control.     Hyperuricemia:  Etiology likely multifactorial and suspected secondary to underlying CKD and gout.  Uric acid level 5.5 and within acceptable range. Will continue current regimen and repeat labs prior to follow-up.     Secondary hyperparathyroidism of renal origin:  In the past, patient was diagnosed with hypercalcemia and chlorthalidone as well as cholecalciferol were discontinued around that time.  She was advised to undergo nuclear medicine parathyroid scan with SPECT, but at the time patient declined imaging.  Actively maintained on Sensipar 30 mg every other day.       Calcium 9, PTH 88.4, vitamin D 28.6. Will continue current regimen and repeat labs prior to follow-up.      Proteinuria:  Recent urinalysis in September 2023 showed 2+ protein, no updated microalbumin to creatinine ratio prior to visit.  Prior SPEP on 8/3/2023 showed M spike, UPEP showed nonselective proteinuria, no M spike on immunofixation.  Following with hematology.    No updated urine studies prior to visit today.      Type 2 diabetes:  Actively maintained on Lantus and Humalog insulin.  Recent hemoglobin A1c was 7.3, discussed importance of good glycemic control in the setting of known CKD and proteinuria and recommend a goal hemoglobin A1c of 7.0 or less.    Advised nephrology follow-up in 6 months. Will repeat CKD labs prior to appointment.     GRACIELA Palacio  Nephrology  2/28/2024      Portions of the record may have been created with voice recognition software. Occasional wrong word or \"sound a like\" substitutions may have occurred " due to the inherent limitations of voice recognition software. Read the chart carefully and recognize, using context, where substitutions have occurred.

## 2024-02-27 ENCOUNTER — TELEPHONE (OUTPATIENT)
Dept: NEPHROLOGY | Facility: CLINIC | Age: 79
End: 2024-02-27

## 2024-02-28 ENCOUNTER — OFFICE VISIT (OUTPATIENT)
Dept: NEPHROLOGY | Facility: CLINIC | Age: 79
End: 2024-02-28
Payer: COMMERCIAL

## 2024-02-28 VITALS
WEIGHT: 138 LBS | RESPIRATION RATE: 16 BRPM | OXYGEN SATURATION: 98 % | TEMPERATURE: 96.9 F | BODY MASS INDEX: 27.09 KG/M2 | HEART RATE: 69 BPM | HEIGHT: 60 IN | SYSTOLIC BLOOD PRESSURE: 154 MMHG | DIASTOLIC BLOOD PRESSURE: 90 MMHG

## 2024-02-28 DIAGNOSIS — N18.4 HYPERTENSIVE KIDNEY DISEASE WITH STAGE 4 CHRONIC KIDNEY DISEASE (HCC): ICD-10-CM

## 2024-02-28 DIAGNOSIS — R80.9 PROTEINURIA, UNSPECIFIED TYPE: ICD-10-CM

## 2024-02-28 DIAGNOSIS — N25.81 SECONDARY HYPERPARATHYROIDISM OF RENAL ORIGIN (HCC): ICD-10-CM

## 2024-02-28 DIAGNOSIS — E11.21 TYPE 2 DIABETES MELLITUS WITH DIABETIC NEPHROPATHY, WITHOUT LONG-TERM CURRENT USE OF INSULIN (HCC): ICD-10-CM

## 2024-02-28 DIAGNOSIS — N28.1 COMPLEX RENAL CYST: ICD-10-CM

## 2024-02-28 DIAGNOSIS — Z71.89 ADVANCED CARE PLANNING/COUNSELING DISCUSSION: Primary | ICD-10-CM

## 2024-02-28 DIAGNOSIS — N18.4 STAGE 4 CHRONIC KIDNEY DISEASE (HCC): Primary | ICD-10-CM

## 2024-02-28 DIAGNOSIS — I12.9 HYPERTENSIVE KIDNEY DISEASE WITH STAGE 4 CHRONIC KIDNEY DISEASE (HCC): ICD-10-CM

## 2024-02-28 DIAGNOSIS — E79.0 HYPERURICEMIA: ICD-10-CM

## 2024-02-28 PROCEDURE — 99214 OFFICE O/P EST MOD 30 MIN: CPT

## 2024-02-28 PROCEDURE — 99497 ADVNCD CARE PLAN 30 MIN: CPT

## 2024-02-28 NOTE — LETTER
February 28, 2024     Ritchie Lawton MD  [address]  [address] [address]    Patient: Anamaria Parnell   YOB: 1945   Date of Visit: 2/28/2024       Dear Dr. Lawton:    Thank you for referring Anamaria Parnell to me for evaluation. Below are my notes for this consultation.    If you have questions, please do not hesitate to call me. I look forward to following your patient along with you.         Sincerely,        GRACIELA Palacio        CC: No Recipients    GRACIELA Palacio  2/28/2024  2:36 PM  Sign when Signing Visit  NEPHROLOGY OFFICE NOTE    Patient: Anamaria Parnell               Sex: female           YOB: 1945        Age:  78 y.o.       2/28/2024      BACKGROUND     I had the pleasure of seeing Anamaria Parnell in the nephrology office today for follow-up.  She has a past medical history significant for GERD, mesenteric artery stenosis, type 2 diabetes with diabetic nephropathy, type 2 diabetes, hypertension, renal artery stenosis, prior CVA, CKD stage IV, complex renal cyst, vitamin D deficiency, and hyperlipidemia.     He was hospitalized at [facility] from 9/3/23 to 9/9/23 presenting with onset of right upper and lower extremity weakness while at skilled nursing facility and found to have acute CVA.  She was followed by neurology and actively maintained on aspirin, Brilinta, and pravastatin.     She has underlying symptomatic ICA stenosis and underwent left TCAR on 9/7/2023 during hospitalization, felt to be etiology of above stroke.     Currently follows with Dr. NAYE Longoria for CKD management and was last seen in the office on 8/7/2023.  She has underlying CKD stage IV, after review of the medical record it does appear that her baseline creatinine fluctuates around 1.5-1.8.  She has a known cystic lesion in the right kidney that was present on renal ultrasound, advised follow-up with contrast-enhanced CT scan or MRI using renal  [facility] protocol once renal function stable.     He has underlying hypertension, actively maintained on amlodipine, clonidine patch, losartan, and labetalol.  She underwent renal artery Doppler on 1/7/2020 and was found to have a greater than 60% narrowing of the right main renal artery with left side difficult to evaluate.    She has atherosclerosis of the right lower extremity and following closely with Dr. Gallo. She reports improvement in her symptoms with conservative management. Surgery has been discussed with her, but she and her son report that it was told she would be very high risk.     She presents with her son for follow-up visit today.  She reports that she has been overall doing well since her last office visit.  She still having some discomfort in her right foot.    Most recent labs were obtained on 2/15/2024, which we have reviewed together.     SUBJECTIVE     She currently has no major complaints at this time and is feeling well. Patient denies any chest pain, shortness of breath, or swelling.    REVIEW OF SYSTEMS     Review of Systems   Constitutional:  Positive for activity change. Negative for chills, fatigue and fever.   HENT:  Negative for trouble swallowing.    Respiratory:  Negative for shortness of breath.    Cardiovascular:  Negative for leg swelling.   Gastrointestinal:  Negative for nausea and vomiting.   Genitourinary:  Negative for difficulty urinating, dysuria, frequency and hematuria.   Musculoskeletal:  Negative for back pain.   Skin:  Negative for pallor.        Redness, pain to right foot   Neurological:  Negative for dizziness, syncope, weakness and light-headedness.   Psychiatric/Behavioral:  Negative for sleep disturbance. The patient is not nervous/anxious.        OBJECTIVE     Current Weight: Weight - Scale: 62.6 kg (138 lb)  Vitals:    02/28/24 1334   BP: 154/90   Pulse: 69   Resp: 16   Temp: (!) 96.9 °F (36.1 °C)   SpO2: 98%     Body mass index is 26.95 kg/m².    CURRENT  MEDICATIONS       Current Outpatient Medications:   •  acetaminophen (TYLENOL) 500 mg tablet, Take 650 mg by mouth every 6 (six) hours as needed for mild pain, Disp: , Rfl:   •  allopurinol (ZYLOPRIM) 100 mg tablet, TAKE 1 TABLET BY MOUTH EVERY DAY, Disp: 90 tablet, Rfl: 2  •  amLODIPine (NORVASC) 5 mg tablet, Take 1 tablet (5 mg total) by mouth daily Primary hypertension [I10], Disp: 90 tablet, Rfl: 0  •  aspirin 81 mg chewable tablet, Chew 81 mg daily, Disp: , Rfl:   •  B-D ULTRAFINE III SHORT PEN 31G X 8 MM MISC, USE TWICE A DAY, Disp: 180 each, Rfl: 2  •  cinacalcet (SENSIPAR) 30 mg tablet, TAKE 1 TABLET (30 MG TOTAL) BY MOUTH EVERY OTHER DAY, Disp: 45 tablet, Rfl: 2  •  cloNIDine (CATAPRES-TTS-3) 0.3 mg/24 hr, PLACE 1 PATCH (0.3 MG TOTAL) ON THE SKIN ONCE A WEEK, Disp: 12 patch, Rfl: 1  •  escitalopram (LEXAPRO) 10 mg tablet, Take 1 tablet (10 mg total) by mouth daily, Disp: 90 tablet, Rfl: 0  •  Glucagon 1 MG/0.2ML SOAJ, Inject 1 mg under the skin if needed (low blood sugar), Disp: , Rfl:   •  insulin glargine (LANTUS) 100 units/mL subcutaneous injection, Inject 8 Units under the skin daily at bedtime, Disp: 10 mL, Rfl: 0  •  insulin lispro (HumaLOG) 100 units/mL injection, Inject under the skin, Disp: , Rfl:   •  labetalol (NORMODYNE) 300 mg tablet, Take 1 tablet (300 mg total) by mouth every 12 (twelve) hours, Disp: 180 tablet, Rfl: 1  •  Lantus SoloStar 100 units/mL SOPN, 9 UNITS SUBCUTANEOUS AT BEDTIME, Disp: , Rfl:   •  lidocaine (LIDODERM) 5 %, Apply 1 patch topically daily Remove & Discard patch within 12 hours or as directed by MD, Disp: , Rfl:   •  losartan (COZAAR) 100 MG tablet, Take 1 tablet (100 mg total) by mouth daily, Disp: 90 tablet, Rfl: 1  •  pravastatin (PRAVACHOL) 80 mg tablet, Take 1 tablet (80 mg total) by mouth daily with dinner, Disp: 30 tablet, Rfl: 0  •  prednisoLONE acetate (PRED FORTE) 1 % ophthalmic suspension, , Disp: , Rfl:   •  ticagrelor (BRILINTA) 90 MG, Take 1 tablet (90 mg  total) by mouth every 12 (twelve) hours, Disp: 60 tablet, Rfl: 5  •  traZODone (DESYREL) 50 mg tablet, TAKE 1 TABLET BY MOUTH DAILY AT BEDTIME, Disp: 90 tablet, Rfl: 1    PHYSICAL EXAMINATION     Physical Exam  Vitals reviewed.   Constitutional:       General: She is not in acute distress.  HENT:      Head: Normocephalic.      Mouth/Throat:      Lips: Pink.      Mouth: Mucous membranes are moist.   Eyes:      General: Lids are normal. No scleral icterus.  Cardiovascular:      Rate and Rhythm: Normal rate and regular rhythm.      Heart sounds: S1 normal and S2 normal.   Pulmonary:      Effort: Pulmonary effort is normal. No accessory muscle usage or respiratory distress.      Breath sounds: Normal breath sounds.   Abdominal:      General: There is no distension.      Tenderness: There is no abdominal tenderness.   Musculoskeletal:      Cervical back: Normal range of motion and neck supple. No tenderness.      Right lower leg: No edema.      Left lower leg: No edema.   Skin:     General: Skin is warm.      Coloration: Skin is not cyanotic or jaundiced.   Neurological:      General: No focal deficit present.      Mental Status: She is alert and oriented to person, place, and time.   Psychiatric:         Attention and Perception: Attention normal.         Speech: Speech normal.         Behavior: Behavior is cooperative.           LAB RESULTS     BMP 2/15/2024:  Sodium 141  Potassium 4.1  Chloride 106  CO2 24  Anion Gap 11  BUN 26  Creatinine 1.61  Glucose 95  Calcium 9.0  eGFR 30      RADIOLOGY RESULTS      Results for orders placed during the hospital encounter of 01/22/22    XR chest portable    Narrative  CHEST    INDICATION:   cough. Patient has confirmed COVID-19.    COMPARISON:  None    EXAM PERFORMED/VIEWS:  XR CHEST PORTABLE      FINDINGS:    Cardiomediastinal silhouette appears unremarkable.    Mild bilateral infiltrates.  No pneumothorax or pleural effusion.    Osseous structures appear within normal limits  for patient age.    Impression  Bilateral infiltrates compatible with confirmed COVID 19 infection.            Workstation performed: [id number]    Ultrasound of the kidney and bladder 9/6/2023:  Knee symmetric and normal in size.  Right kidney with a 1.4 x 1.7 x 1.5 cm cyst.  Cystic lesion in the right kidney which appears to have thickened, perceptible wall.  Recommend further evaluation of this finding with either contrast-enhanced CT or MRI using renal mass protocol on patient's renal function recovers.      ASSESSMENT/PLAN     Chronic kidney disease stage IV:  Etiology of CKD multifactorial and suspected secondary to hypertensive nephrosclerosis, atrophic left kidney, and age-related nephron loss.      After review of the medical record, it appears that current creatinine levels have been fluctuating from 1.6-1.9.      Her office visit with Dr. NAYE Longoria on 8/7/2023, dialysis was discussed in length with the patient.  At that time, she stated that she would not like to pursue any form of dialysis even though can be a life-saving procedure for progressive chronic kidney disease.  We had a detailed discussion regarding her CKD at the appointment today and she confirmed to me that she does not want to pursue any form of dialysis.  I had an ACP visit today with her son, please see separate note.    Current creatinine level 1.61 with an eGFR of 30 and stable within baseline. Advise that she stay hydrated and avoid nephrotoxic agents such as NSAIDs.      Complex renal cyst:  On most recent renal ultrasound in September 2023, right kidney with a 1.4 x 1.7 x 1.5 cm cyst which appears to have a thickened perceivable wall.  Patient was recommended to undergo further evaluation with contrast-enhanced CT or MRI using renal mass protocol. In light of CKD stage 4 and eGFR < 30, it was not recommend for her to undergo MRI with gadolinium at that time.    Discussed renal optimization measures with contrast CT imaging to reduce  risk of RICARDO, however, patient would still be at elevated risk given current level of renal function and prior episodes of DAHLIA.  After our discussion, she not want any imaging with IV contrast.    Discussed that if her eGFR remains stable at above 30 on follow-up labs, we can consider if MRI with gadolinium for further evaluation.  She reports that she is willing to consider MRI. We will repeat BMP in 2 months to determine if testing can be safely ordered.     Hypertension:  Currently maintained on amlodipine 5 mg daily, clonidine patch 0.3 mg weekly, labetalol 300 mg twice daily, and losartan 100 mg daily.  Blood pressure appears to be borderline on visit today.  Patient's son reports that they are not monitoring blood pressures at home and they will resume home blood pressure monitoring.     Undergone renal artery Doppler January 2020 which was shown to have greater than 60% stenosis of the right main renal artery currently undergoing medical management and blood pressure control.     Hyperuricemia:  Etiology likely multifactorial and suspected secondary to underlying CKD and gout.  Uric acid level 5.5 and within acceptable range. Will continue current regimen and repeat labs prior to follow-up.     Secondary hyperparathyroidism of renal origin:  In the past, patient was diagnosed with hypercalcemia and chlorthalidone as well as cholecalciferol were discontinued around that time.  She was advised to undergo nuclear medicine parathyroid scan with SPECT, but at the time patient declined imaging.  Actively maintained on Sensipar 30 mg every other day.       Calcium 9, PTH 88.4, vitamin D 28.6. Will continue current regimen and repeat labs prior to follow-up.      Proteinuria:  Recent urinalysis in September 2023 showed 2+ protein, no updated microalbumin to creatinine ratio prior to visit.  Prior SPEP on 8/3/2023 showed M spike, UPEP showed nonselective proteinuria, no M spike on immunofixation.  Following with  "hematology.    No updated urine studies prior to visit today.      Type 2 diabetes:  Actively maintained on Lantus and Humalog insulin.  Recent hemoglobin A1c was 7.3, discussed importance of good glycemic control in the setting of known CKD and proteinuria and recommend a goal hemoglobin A1c of 7.0 or less.    Advised nephrology follow-up in 6 months. Will repeat CKD labs prior to appointment.     GRACIELA Palacio  Nephrology  2/28/2024      Portions of the record may have been created with voice recognition software. Occasional wrong word or \"sound a like\" substitutions may have occurred due to the inherent limitations of voice recognition software. Read the chart carefully and recognize, using context, where substitutions have occurred.   "

## 2024-02-28 NOTE — LETTER
February 28, 2024     Savage Longoria MD  [address]  [address]  [facility] [address]    Patient: Anamaria Parnell   YOB: 1945   Date of Visit: 2/28/2024       Dear Dr. Longoria:    Thank you for referring Anamaria Parnell to me for evaluation. Below are my notes for this consultation.    If you have questions, please do not hesitate to call me. I look forward to following your patient along with you.         Sincerely,        GRACIELA Palacio        CC: No Recipients

## 2024-02-28 NOTE — PATIENT INSTRUCTIONS
Chronic Kidney Disease   AMBULATORY CARE:   Chronic kidney disease (CKD)  is the gradual and permanent loss of kidney function. Normally, the kidneys remove fluid, chemicals, and waste from your blood. These wastes are turned into urine by your kidneys. CKD may worsen over time and lead to kidney failure.       Common signs and symptoms include the following:   Changes in how often you need to urinate    Swelling in your arms, legs, or feet    Shortness of breath    Fatigue or weakness    Bad or bitter taste in your mouth    Nausea, vomiting, or loss of appetite    Call your local emergency number (911 in the [place]) if:   You have a seizure.    You have shortness of breath.    Seek care immediately if:   You are confused and very drowsy.       Call your doctor or nephrologist if:   You suddenly gain or lose more weight than your healthcare provider has told you is okay.    You have itchy skin or a rash.    You urinate more or less than you normally do.    You have blood in your urine.    You have nausea and are vomiting.    You have fatigue or muscle weakness.    You have hiccups that will not stop.    You have questions or concerns about your condition or care.    How CKD is diagnosed:  CKD has 5 stages. Your healthcare provider will use results from the following tests to find the stage of CKD you have:  Blood and urine tests  show how well your kidneys are working. They may also show the cause of your CKD.    Ultrasound, CT scan, or MRI  pictures may be used to check your kidneys. You may be given contrast liquid to help your kidneys show up better in the pictures. Tell the healthcare provider if you have ever had an allergic reaction to contrast liquid. Do not enter the MRI room with anything metal. Metal can cause serious injury. Tell the healthcare provider if you have any metal in or on your body.    A biopsy  is a procedure to remove a small piece of tissue from your kidney. It is done to find the cause of your  CKD.    Treatment  can help control signs and symptoms, and prevent a worse stage of CKD. Your care team may include specialists, such as a dietitian or a heart specialist. This depends on the stage of your CKD and if you have other health conditions to manage. Healthcare providers will work with you to create a plan based on your decisions for treatment. Your treatment plan may include any of the following:  Medicines  may be given to decrease your blood pressure and get rid of extra fluid. You may also receive medicine to manage health conditions that may occur with CKD, such as anemia, diabetes, and heart disease.    Dialysis  is a treatment to remove chemicals and waste from your blood when your kidneys can no longer do this.    Surgery  may be needed to create an arteriovenous fistula (AVF) in your arm or insert a catheter into your abdomen. This is done so you can receive dialysis.    A kidney transplant  may be done if your CKD becomes severe.    What you can do to manage CKD:  Management may include making some lifestyle changes. Tell your healthcare provider if you have any concerns about being able to make changes. He or she can help you find solutions, including working with specialists. Ask for help creating a plan to break large goals into smaller steps. Your plan may include any of the following:  Manage other health conditions.  Your healthcare provider will work with you to make a care plan that meets your needs. You will be checked regularly for heart disease or other conditions that can make CKD worse, such as diabetes. Your blood pressure will be closely monitored. You will also get a target blood pressure and help making a plan to reach your target. This may include taking your blood pressure at home.         Maintain a healthy weight.  Your weight and body mass index (BMI) will be checked regularly. BMI helps find if your weight is healthy for your height. Your healthcare provider will use other  tests to check your muscle and protein levels. Extra weight can strain your kidneys. A low weight or low muscle mass can make you feel more tired. You may have trouble doing your daily activities. Ask your provider what a healthy weight is for you. He or she can help you create a plan to lose or gain weight safely, if needed. The plan may include keeping a food diary. This is a list of foods and liquids you have each day. Your provider will use the diary to help you make changes, if needed. Changes are based on your health and any other conditions you have, such as diabetes.    Create an exercise plan.  Regular exercise can help you manage CKD, high blood pressure, and diabetes. Exercise also helps control weight. Your provider can help you create exercise goals and a plan to reach those goals. For example, your goal may be to exercise for 30 minutes in a day. Your plan can include breaking exercise into 10 minute sessions, 3 times during the day.         Create a healthy eating plan.  Your provider may tell you to eat food low in potassium, phosphorus, or protein. Your provider may also recommend vitamin or mineral supplements. Do not take any supplements without talking to your provider. A dietitian can help you plan meals if needed. Ask how much liquid to drink each day and which liquids are best for you.         Limit sodium (salt) as directed.  You may need to limit sodium to less than 2,300 milligrams (mg) each day. Ask your dietitian or healthcare provider how much sodium you can have each day. The amount depends on your stage of kidney disease. Table salt, canned foods, soups, salted snacks, and processed meats, like deli meats and sausage, are high in sodium. Your provider or a dietitian can show you how to read food labels for sodium.    Limit alcohol as directed.  Alcohol can cause fluid retention and can affect your kidneys. Ask how much alcohol is safe for you. A drink of alcohol is 12 ounces of beer, 5  ounces of wine, or 1½ ounces of liquor.    Do not smoke.  Nicotine and other chemicals in cigarettes and cigars can cause kidney damage. Ask your provider for information if you currently smoke and need help to quit. E-cigarettes or smokeless tobacco still contain nicotine. Talk to your provider before you use these products.    Ask about over-the-counter medicines.  Medicines such as NSAIDs and laxatives may harm your kidneys. Some cough and cold medicines can raise your blood pressure. Always ask if a medicine is safe before you take it.    Ask about vaccines you may need.  CKD can increase your risk for infections such as pneumonia, influenza, and hepatitis. Vaccines lower your risk for infection. Your healthcare provider will tell you which vaccines you need and when to get them.    Follow up with your doctor or nephrologist as directed:  You will need to return for tests to monitor your kidney and nerve function, and your parathyroid hormone level. Your medicines may be changed, based on certain test results. Write down your questions so you remember to ask them during your visits.  © Copyright [employer] 2023 Information is for End User's use only and may not be sold, redistributed or otherwise used for commercial purposes.  The above information is an  only. It is not intended as medical advice for individual conditions or treatments. Talk to your doctor, nurse or pharmacist before following any medical regimen to see if it is safe and effective for you.

## 2024-02-28 NOTE — ACP (ADVANCE CARE PLANNING)
NEPHROLOGY ADVANCED GOALS OF CARE MEETING  Anamaria Parnell 78 y.o. female MRN: [id number]  2024        ASSESSMENT and PLAN:    I had the pleasure of seeing Anamaria Parnell today in the renal clinic for discussion of the patient's goals of care. Our discussion today will focus on helping Anamaria achieve their desired goals, long term goals of care and how best to achieve those goals. The participants during this visit include Austin Parnell (son).     Kidney Smart Class: Completed Kidney Smart Class  Would Pursue Dialysis if Needed: No  Dialysis Access: No Access   Advanced Directive: Not Completed  Code Status: Level 3: DNAR and DNI  POA: Austin Parnell [phone]  Do they need to be referred to Palliative Care: No  Do they need to be referred to Hospice: No  Length/Time of Meetin Minutes  Outpatient Nephrologist: Savage Longoria MD    Discussion and Plan:    I had the pleasure of seeing Anamaria Parnell in the nephrology office today for  ACP visit.  She has a past medical history significant for GERD, mesenteric artery stenosis, type 2 diabetes with diabetic nephropathy, type 2 diabetes, hypertension, renal artery stenosis, prior CVA, CKD stage IV, complex renal cyst, vitamin D deficiency, and hyperlipidemia.     She has a history of chronic kidney disease stage IV and currently following with Dr. NAYE Longoria for management.  After review of the medical record, it appears that her baseline creatinine levels have been fluctuating around 1.6-1.9.  Most recent creatinine level is 1.61 with an EGFR of 30.    She does have a history of stroke from 2023 and does still have some residual aphasia and weakness.      She reports that she has been having issues with worsening atherosclerosis affecting her right lower extremity.  She is following closely with Dr. Gallo for management.  They discussed possible CT angiogram, however, she has history of contrast-induced nephropathy in the past and does not want to  go undergo any sort of procedure with administration of contrast as she is concerned that it may damage her kidneys further.  At this point, she states that they are monitoring medically but are unclear if surgery may be necessary in the future.    She lives with her son and reports that have had multiple conversation regarding her medical wishes and they have not changed.  After our discussion, she confirmed to me that she wishes to remain DNR/DNI.  She prefers not to be hospitalized or admitted to the ICU.  We discussed the progressive nature of chronic kidney disease and she confirmed to me that she does not want to pursue any type of dialysis as a potentially life-saving treatment for her chronic kidney disease.  I discussed palliative care with Anamaria and her son and they would be interested in pursuing palliative care when necessary.    She has expressed her current wishes within the 5 wishes packet are up-to-date.  I reviewed with her and her son that the packet needs to be signed and witnessed by 2 individuals that meet witness criteria.  I confirmed to her that once the packet is signed and witnessed it can be scanned into her chart as part of her medical document.    REVIEW OF SYSTEMS:    Review of Systems   Constitutional:  Positive for activity change. Negative for chills, fatigue and fever.   HENT:  Negative for trouble swallowing.    Respiratory:  Negative for shortness of breath.    Cardiovascular:  Negative for leg swelling.   Gastrointestinal:  Negative for constipation, diarrhea, nausea and vomiting.   Genitourinary:  Negative for difficulty urinating, dysuria, frequency and hematuria.   Musculoskeletal:  Negative for back pain.   Skin:  Negative for pallor.   Neurological:  Positive for weakness. Negative for dizziness, syncope and light-headedness.   Psychiatric/Behavioral:  Negative for sleep disturbance. The patient is not nervous/anxious.          Medications:    Current Outpatient Medications:      acetaminophen (TYLENOL) 500 mg tablet, Take 650 mg by mouth every 6 (six) hours as needed for mild pain, Disp: , Rfl:     allopurinol (ZYLOPRIM) 100 mg tablet, TAKE 1 TABLET BY MOUTH EVERY DAY, Disp: 90 tablet, Rfl: 2    amLODIPine (NORVASC) 5 mg tablet, Take 1 tablet (5 mg total) by mouth daily Primary hypertension [I10], Disp: 90 tablet, Rfl: 0    aspirin 81 mg chewable tablet, Chew 81 mg daily, Disp: , Rfl:     B-D ULTRAFINE III SHORT PEN 31G X 8 MM MISC, USE TWICE A DAY, Disp: 180 each, Rfl: 2    cinacalcet (SENSIPAR) 30 mg tablet, TAKE 1 TABLET (30 MG TOTAL) BY MOUTH EVERY OTHER DAY, Disp: 45 tablet, Rfl: 2    cloNIDine (CATAPRES-TTS-3) 0.3 mg/24 hr, PLACE 1 PATCH (0.3 MG TOTAL) ON THE SKIN ONCE A WEEK, Disp: 12 patch, Rfl: 1    escitalopram (LEXAPRO) 10 mg tablet, Take 1 tablet (10 mg total) by mouth daily, Disp: 90 tablet, Rfl: 0    Glucagon 1 MG/0.2ML SOAJ, Inject 1 mg under the skin if needed (low blood sugar), Disp: , Rfl:     insulin glargine (LANTUS) 100 units/mL subcutaneous injection, Inject 8 Units under the skin daily at bedtime, Disp: 10 mL, Rfl: 0    insulin lispro (HumaLOG) 100 units/mL injection, Inject under the skin, Disp: , Rfl:     labetalol (NORMODYNE) 300 mg tablet, Take 1 tablet (300 mg total) by mouth every 12 (twelve) hours, Disp: 180 tablet, Rfl: 1    Lantus SoloStar 100 units/mL SOPN, 9 UNITS SUBCUTANEOUS AT BEDTIME, Disp: , Rfl:     lidocaine (LIDODERM) 5 %, Apply 1 patch topically daily Remove & Discard patch within 12 hours or as directed by MD, Disp: , Rfl:     losartan (COZAAR) 100 MG tablet, Take 1 tablet (100 mg total) by mouth daily, Disp: 90 tablet, Rfl: 1    pravastatin (PRAVACHOL) 80 mg tablet, Take 1 tablet (80 mg total) by mouth daily with dinner, Disp: 30 tablet, Rfl: 0    prednisoLONE acetate (PRED FORTE) 1 % ophthalmic suspension, , Disp: , Rfl:     ticagrelor (BRILINTA) 90 MG, Take 1 tablet (90 mg total) by mouth every 12 (twelve) hours, Disp: 60 tablet, Rfl: 5     "traZODone (DESYREL) 50 mg tablet, TAKE 1 TABLET BY MOUTH DAILY AT BEDTIME, Disp: 90 tablet, Rfl: 1           Advanced Care Planning Progress Note    Serious Illness Conversation    1. What is your understanding now of where you are with your illness?  Prognostic Understanding: appropriate understanding of prognosis  I have a good understanding      2. How much information about what is likely to be ahead with your illness would you like to have?  Information: patient wants to be fully informed  I want to be fully informed     3. What did you (clinician) communicate to the patient?  Prognostic Communication: Uncertain - It can be difficult to predict what will happen with your illness. I hope you will continue to live well for a long time but I’m worried that you could get sick quickly, and I think it is important to prepare for that possibility.     4. If your health situation worsens, what are your most important goals?  Goals: be at home, be physically comfortable, be emotionally at peace  I want to be at home with my family. I would like to be comfortable with minimal pain. I want to die in peace.      5. What are the biggest fears and worries about the future and your health?  I dont have any concerns, I feel ready when \"the big man up stairs\" tells me that I am ready.   My  passed away 46 years ago after open heart surgery, I'm ready to see him again. I had my son 1 month later, so he never got to meet his father.      6. What abilities are so critical to your life that you cannot imagine living without them?  Unacceptable Function: being unable to communicate effectively  I need to be able to talk with my son and communicate my needs.   There was a period of time last year after my stroke that I couldn't communicate and it was very frustrating for me, I do not want to experience that again.      7. What gives you strength as you think about the future with your illness?  My son because he is there for " "me. \"Mom has always been a fighter.\"   A lot of the rest of our family is gone and we live far from others.      8. If you become sicker, how much are you willing to go through for the possibility of gaining more time?  Be in the hospital: No Have a feeding tube: No   Be in the ICU: No Live in a nursing home: No   Be on a ventilator: No Be uncomfortable: Yes   Be on dialysis: No Undergo aggressive test and/or procedures: Yes   I am ready to go when the time comes.   I don't trust nursing homes, I want to be home.   At present, I don't like hospitals and do not want to be in the ICU.   I don't take pain medications currently, but I want to physically be comfortable at the end of my life and offered pain medication. My mom got morphine and  in peace.     9. How much does your proxy and family know about your priorities and wishes?  Discussion Discussion: extensive discussion with family about goals and wishes     I’ve heard you say that being at peace and comfortable at home is really important to you. Keeping that in mind, and what we know about your illness, I recommend that we continue medical management of your chronic kidney disease and avoid dialysis.  We will plan for referral to palliative care should you develop symptoms to keep you comfortable. This will help us make sure that your treatment plans reflect what’s important to you.     How does this plan sound to you? I will do everything I can to help you through this.  Patient verbalized understanding of the plan     I have spent 30 minutes speaking with my patient on advanced care planning today or during this visit     Advanced directives  Five Wishes: Patient has Five Wishes, not in chart         GRACIELA Palacio       "

## 2024-03-06 DIAGNOSIS — E78.5 HYPERLIPIDEMIA ASSOCIATED WITH TYPE 2 DIABETES MELLITUS: ICD-10-CM

## 2024-03-06 DIAGNOSIS — E11.69 HYPERLIPIDEMIA ASSOCIATED WITH TYPE 2 DIABETES MELLITUS: ICD-10-CM

## 2024-03-06 RX ORDER — PRAVASTATIN SODIUM 80 MG/1
80 TABLET ORAL
Qty: 30 TABLET | Refills: 4 | Status: SHIPPED | OUTPATIENT
Start: 2024-03-06

## 2024-03-06 NOTE — TELEPHONE ENCOUNTER
Asking if we can take over ordering the pravistatin for her?     Pt is out of it and dr blake [place] refill , saying pcp can take over     If we can  [facility] please

## 2024-03-11 ENCOUNTER — TELEPHONE (OUTPATIENT)
Dept: NEPHROLOGY | Facility: CLINIC | Age: 79
End: 2024-03-11

## 2024-03-11 NOTE — TELEPHONE ENCOUNTER
I called and left a message on machine for patient stating that her appointment for 7/15/2024 nephrology follow up with Amelia was reschedule to 7/31/2024 because of Amelia having a schedule change. A new appointment card was mailed out to the patient showing her the new time and day of her appointment.

## 2024-03-11 NOTE — TELEPHONE ENCOUNTER
Patient's son inder called office returning cristiana's call regarding appointment being rescheduled from 07/15/24 @ 3:30pm to 07/31/24 @ 12:00pm. inder is confirming new scheduled appointment for patient and is also advised to get patient's non-fasting labs done 1 week prior. inder verbalized understanding and is okay with it.

## 2024-03-12 DIAGNOSIS — E11.59 HYPERTENSION ASSOCIATED WITH DIABETES: ICD-10-CM

## 2024-03-12 DIAGNOSIS — R21 RASH: ICD-10-CM

## 2024-03-12 DIAGNOSIS — I70.0 AORTOILIAC STENOSIS, LEFT (HCC): ICD-10-CM

## 2024-03-12 DIAGNOSIS — E11.69 HYPERLIPIDEMIA ASSOCIATED WITH TYPE 2 DIABETES MELLITUS: ICD-10-CM

## 2024-03-12 DIAGNOSIS — Z86.73 HISTORY OF CVA (CEREBROVASCULAR ACCIDENT): ICD-10-CM

## 2024-03-12 DIAGNOSIS — I65.1 BASILAR ARTERY STENOSIS: ICD-10-CM

## 2024-03-12 DIAGNOSIS — R60.9 DEPENDENT EDEMA: ICD-10-CM

## 2024-03-12 DIAGNOSIS — I10 HYPERTENSION, UNSPECIFIED TYPE: ICD-10-CM

## 2024-03-12 DIAGNOSIS — I15.2 HYPERTENSION ASSOCIATED WITH DIABETES: ICD-10-CM

## 2024-03-12 DIAGNOSIS — I73.9 CLAUDICATION IN PERIPHERAL VASCULAR DISEASE (HCC): ICD-10-CM

## 2024-03-12 DIAGNOSIS — E78.5 HYPERLIPIDEMIA ASSOCIATED WITH TYPE 2 DIABETES MELLITUS: ICD-10-CM

## 2024-03-12 RX ORDER — CLONIDINE 0.3 MG/24H
1 PATCH, EXTENDED RELEASE TRANSDERMAL WEEKLY
Qty: 12 PATCH | Refills: 1 | Status: SHIPPED | OUTPATIENT
Start: 2024-03-12

## 2024-03-19 ENCOUNTER — CONSULTATION (OUTPATIENT)
Dept: URBAN - METROPOLITAN AREA CLINIC 6 | Facility: CLINIC | Age: 79
End: 2024-03-19

## 2024-03-19 DIAGNOSIS — H26.492: ICD-10-CM

## 2024-03-19 DIAGNOSIS — Z79.4: ICD-10-CM

## 2024-03-19 DIAGNOSIS — E11.3212: ICD-10-CM

## 2024-03-19 PROCEDURE — 92083 EXTENDED VISUAL FIELD XM: CPT

## 2024-03-19 PROCEDURE — 66821 AFTER CATARACT LASER SURGERY: CPT | Mod: 57,LT

## 2024-03-19 ASSESSMENT — VISUAL ACUITY
OS_CC: 20/150
OD_CC: 20/50

## 2024-03-19 ASSESSMENT — TONOMETRY: OS_IOP_MMHG: 19

## 2024-03-27 ENCOUNTER — TELEPHONE (OUTPATIENT)
Dept: HEMATOLOGY ONCOLOGY | Facility: CLINIC | Age: 79
End: 2024-03-27

## 2024-03-27 NOTE — TELEPHONE ENCOUNTER
I called patient and left a detailed voicemail regarding her missed appointment on 03/27/24. I left the hope line number to call back and reschedule.

## 2024-04-01 ENCOUNTER — RA CDI HCC (OUTPATIENT)
Dept: OTHER | Facility: HOSPITAL | Age: 79
End: 2024-04-01

## 2024-04-01 DIAGNOSIS — E11.69 HYPERLIPIDEMIA ASSOCIATED WITH TYPE 2 DIABETES MELLITUS  (HCC): ICD-10-CM

## 2024-04-01 DIAGNOSIS — E78.5 HYPERLIPIDEMIA ASSOCIATED WITH TYPE 2 DIABETES MELLITUS  (HCC): ICD-10-CM

## 2024-04-01 DIAGNOSIS — I10 PRIMARY HYPERTENSION: ICD-10-CM

## 2024-04-01 RX ORDER — AMLODIPINE BESYLATE 5 MG/1
5 TABLET ORAL DAILY
Qty: 90 TABLET | Refills: 0 | Status: SHIPPED | OUTPATIENT
Start: 2024-04-01

## 2024-04-01 RX ORDER — PRAVASTATIN SODIUM 80 MG/1
80 TABLET ORAL
Qty: 90 TABLET | Refills: 2 | Status: SHIPPED | OUTPATIENT
Start: 2024-04-01

## 2024-04-01 NOTE — PROGRESS NOTES
E11.51, e11.69  HCC coding opportunities          Chart Reviewed number of suggestions sent to Provider: 2     Patients Insurance     Medicare Insurance: [employer]age          
done

## 2024-04-08 ENCOUNTER — OFFICE VISIT (OUTPATIENT)
Dept: INTERNAL MEDICINE CLINIC | Facility: CLINIC | Age: 79
End: 2024-04-08
Payer: COMMERCIAL

## 2024-04-08 VITALS
BODY MASS INDEX: 27.48 KG/M2 | HEIGHT: 60 IN | DIASTOLIC BLOOD PRESSURE: 68 MMHG | HEART RATE: 72 BPM | OXYGEN SATURATION: 98 % | WEIGHT: 140 LBS | TEMPERATURE: 97.8 F | SYSTOLIC BLOOD PRESSURE: 116 MMHG

## 2024-04-08 DIAGNOSIS — N18.4 STAGE 4 CHRONIC KIDNEY DISEASE (HCC): ICD-10-CM

## 2024-04-08 DIAGNOSIS — I63.9 ACUTE CVA (CEREBROVASCULAR ACCIDENT) (HCC): ICD-10-CM

## 2024-04-08 DIAGNOSIS — I12.9 HYPERTENSIVE KIDNEY DISEASE WITH STAGE 4 CHRONIC KIDNEY DISEASE (HCC): ICD-10-CM

## 2024-04-08 DIAGNOSIS — E83.52 HYPERCALCEMIA: ICD-10-CM

## 2024-04-08 DIAGNOSIS — N18.4 HYPERTENSIVE KIDNEY DISEASE WITH STAGE 4 CHRONIC KIDNEY DISEASE (HCC): ICD-10-CM

## 2024-04-08 DIAGNOSIS — R80.9 PROTEINURIA, UNSPECIFIED TYPE: ICD-10-CM

## 2024-04-08 DIAGNOSIS — I63.9 CVA (CEREBRAL VASCULAR ACCIDENT) (HCC): ICD-10-CM

## 2024-04-08 DIAGNOSIS — N25.81 SECONDARY HYPERPARATHYROIDISM OF RENAL ORIGIN (HCC): ICD-10-CM

## 2024-04-08 DIAGNOSIS — E11.21 TYPE 2 DIABETES MELLITUS WITH DIABETIC NEPHROPATHY, WITHOUT LONG-TERM CURRENT USE OF INSULIN (HCC): ICD-10-CM

## 2024-04-08 DIAGNOSIS — K55.1 MESENTERIC ARTERY STENOSIS (HCC): ICD-10-CM

## 2024-04-08 DIAGNOSIS — I77.1 CELIAC ARTERY STENOSIS (HCC): ICD-10-CM

## 2024-04-08 DIAGNOSIS — I70.201 FEMORAL ARTERY STENOSIS, RIGHT (HCC): ICD-10-CM

## 2024-04-08 DIAGNOSIS — F33.9 DEPRESSION, RECURRENT (HCC): ICD-10-CM

## 2024-04-08 DIAGNOSIS — E11.22 TYPE 2 DIABETES MELLITUS WITH STAGE 3A CHRONIC KIDNEY DISEASE, WITHOUT LONG-TERM CURRENT USE OF INSULIN (HCC): Primary | ICD-10-CM

## 2024-04-08 DIAGNOSIS — N18.31 TYPE 2 DIABETES MELLITUS WITH STAGE 3A CHRONIC KIDNEY DISEASE, WITHOUT LONG-TERM CURRENT USE OF INSULIN (HCC): Primary | ICD-10-CM

## 2024-04-08 LAB
BACTERIA UR QL AUTO: ABNORMAL /HPF
BILIRUB UR QL STRIP: NEGATIVE
CLARITY UR: ABNORMAL
COLOR UR: YELLOW
CREAT UR-MCNC: 170.4 MG/DL
GLUCOSE UR STRIP-MCNC: NEGATIVE MG/DL
HGB UR QL STRIP.AUTO: ABNORMAL
KETONES UR STRIP-MCNC: NEGATIVE MG/DL
LEUKOCYTE ESTERASE UR QL STRIP: ABNORMAL
MICROALBUMIN UR-MCNC: 1718.8 MG/L
MICROALBUMIN/CREAT 24H UR: 1009 MG/G CREATININE (ref 0–30)
MUCOUS THREADS UR QL AUTO: ABNORMAL
NITRITE UR QL STRIP: NEGATIVE
NON-SQ EPI CELLS URNS QL MICRO: ABNORMAL /HPF
PH UR STRIP.AUTO: 6 [PH]
PROT UR STRIP-MCNC: ABNORMAL MG/DL
RBC #/AREA URNS AUTO: ABNORMAL /HPF
SL AMB POCT HEMOGLOBIN AIC: 5.5 (ref ?–6.5)
SP GR UR STRIP.AUTO: 1.02 (ref 1–1.03)
TRANS CELLS #/AREA URNS HPF: PRESENT /[HPF]
UROBILINOGEN UR STRIP-ACNC: <2 MG/DL
WBC #/AREA URNS AUTO: ABNORMAL /HPF
WBC CLUMPS # UR AUTO: PRESENT /UL

## 2024-04-08 PROCEDURE — 99214 OFFICE O/P EST MOD 30 MIN: CPT | Performed by: INTERNAL MEDICINE

## 2024-04-08 PROCEDURE — 83036 HEMOGLOBIN GLYCOSYLATED A1C: CPT | Performed by: INTERNAL MEDICINE

## 2024-04-08 PROCEDURE — 82043 UR ALBUMIN QUANTITATIVE: CPT

## 2024-04-08 PROCEDURE — 82570 ASSAY OF URINE CREATININE: CPT

## 2024-04-08 PROCEDURE — 81001 URINALYSIS AUTO W/SCOPE: CPT

## 2024-04-08 RX ORDER — ESCITALOPRAM OXALATE 20 MG/1
20 TABLET ORAL DAILY
Qty: 90 TABLET | Refills: 0 | Status: SHIPPED | OUTPATIENT
Start: 2024-04-08 | End: 2024-07-07

## 2024-04-08 RX ORDER — CINACALCET 30 MG/1
30 TABLET, FILM COATED ORAL EVERY OTHER DAY
Qty: 45 TABLET | Refills: 2 | Status: SHIPPED | OUTPATIENT
Start: 2024-04-08 | End: 2025-01-03

## 2024-04-08 NOTE — PROGRESS NOTES
Assessment/Plan:      Patient is here for routine follow up, reviewed chronic medical problems, ordered labs for next visit including CBC CMP TSH A1C LIPID. Reviewed labs for this visit. ACP planning reviewed, she is DNR/DNI.     H/o type 2 DM, following with endocrine, continue current regimen. A1c 5.6;     H/o L MCA and PCA CV, s/p L TCAR on 9/2023, she continues to take aspirin, Brilinta, and statin, aphasia is about the same; right residual weakness same, declining further PT and OT, speech therapy.     H/o HTN, continue current regimen.      Continue f/u with nephrology, h/o CKD stage 4; continue sensipar,  she is firmly against dialysis, even temporary, she explains today. Reports continued leg pain, foot pain, numbness.    Depression is not controlled; increase lexapro to 20 mg.     Weight has improved.     Quality Measures:       Depression Screening and Follow-up Plan: Patient was screened for depression during today's encounter. They screened negative with a PHQ-9 score of 0.         Return in about 3 months (around 7/8/2024) for regular and medicare.    No problem-specific Assessment & Plan notes found for this encounter.       Diagnoses and all orders for this visit:    Type 2 diabetes mellitus with stage 3a chronic kidney disease, without long-term current use of insulin ([facility])  -     POCT hemoglobin A1c  -     Hemoglobin A1C; Future  -     CBC and differential; Future  -     TSH, 3rd generation with Free T4 reflex; Future    Stage 4 chronic kidney disease (HCC)  -     cinacalcet (SENSIPAR) 30 mg tablet; Take 1 tablet (30 mg total) by mouth every other day    Secondary hyperparathyroidism of renal origin (HCC)  -     cinacalcet (SENSIPAR) 30 mg tablet; Take 1 tablet (30 mg total) by mouth every other day    Hypercalcemia  -     cinacalcet (SENSIPAR) 30 mg tablet; Take 1 tablet (30 mg total) by mouth every other day    CVA (cerebral vascular accident) ([facility])  -     ticagrelor (BRILINTA) 90 MG; Take 1  tablet (90 mg total) by mouth every 12 (twelve) hours  -     Hemoglobin A1C; Future  -     CBC and differential; Future  -     TSH, 3rd generation with Free T4 reflex; Future    Depression, recurrent (HCC)    Acute CVA (cerebrovascular accident) (HCC)  -     escitalopram (LEXAPRO) 20 mg tablet; Take 1 tablet (20 mg total) by mouth daily    Mesenteric artery stenosis (HCC)    Femoral artery stenosis, right (HCC) 50-75% stenosis in the common femoral artery.    Celiac artery stenosis (HCC) >70% stenosis in the celiac trunk          Subjective:      Patient ID: Anamaria Parnell is a 78 y.o. female.    Patient is here for routine follow up, reviewed chronic medical problems, ordered labs for next visit including CBC CMP TSH A1C LIPID. Reviewed labs for this visit.    Diabetes  Pertinent negatives for hypoglycemia include no seizures. Pertinent negatives for diabetes include no chest pain.       ALLERGIES:  Allergies   Allergen Reactions    Fenofibrate Other (See Comments)      blood in urine  hx  Kidney Failure    Colesevelam Other (See Comments)      leg pains    Colestipol Itching and Other (See Comments)      Swelling lower legs    Ezetimibe GI Intolerance    Statins Myalgia       CURRENT MEDICATIONS:    Current Outpatient Medications:     acetaminophen (TYLENOL) 500 mg tablet, Take 650 mg by mouth every 6 (six) hours as needed for mild pain, Disp: , Rfl:     allopurinol (ZYLOPRIM) 100 mg tablet, TAKE 1 TABLET BY MOUTH EVERY DAY, Disp: 90 tablet, Rfl: 2    amLODIPine (NORVASC) 5 mg tablet, Take 1 tablet (5 mg total) by mouth daily Primary hypertension [I10], Disp: 90 tablet, Rfl: 0    aspirin 81 mg chewable tablet, Chew 81 mg daily, Disp: , Rfl:     B-D ULTRAFINE III SHORT PEN 31G X 8 MM MISC, USE TWICE A DAY, Disp: 180 each, Rfl: 2    cinacalcet (SENSIPAR) 30 mg tablet, Take 1 tablet (30 mg total) by mouth every other day, Disp: 45 tablet, Rfl: 2    cloNIDine (CATAPRES-TTS-3) 0.3 mg/24 hr, Place 1 patch (0.3 mg  total) on the skin over 7 days once a week, Disp: 12 patch, Rfl: 1    escitalopram (LEXAPRO) 20 mg tablet, Take 1 tablet (20 mg total) by mouth daily, Disp: 90 tablet, Rfl: 0    Glucagon 1 MG/0.2ML SOAJ, Inject 1 mg under the skin if needed (low blood sugar), Disp: , Rfl:     insulin glargine (LANTUS) 100 units/mL subcutaneous injection, Inject 8 Units under the skin daily at bedtime, Disp: 10 mL, Rfl: 0    insulin lispro (HumaLOG) 100 units/mL injection, Inject under the skin, Disp: , Rfl:     labetalol (NORMODYNE) 300 mg tablet, Take 1 tablet (300 mg total) by mouth every 12 (twelve) hours, Disp: 180 tablet, Rfl: 1    Lantus SoloStar 100 units/mL SOPN, 9 UNITS SUBCUTANEOUS AT BEDTIME, Disp: , Rfl:     lidocaine (LIDODERM) 5 %, Apply 1 patch topically daily Remove & Discard patch within 12 hours or as directed by MD, Disp: , Rfl:     losartan (COZAAR) 100 MG tablet, Take 1 tablet (100 mg total) by mouth daily, Disp: 90 tablet, Rfl: 1    pravastatin (PRAVACHOL) 80 mg tablet, TAKE 1 TABLET BY MOUTH DAILY WITH DINNER, Disp: 90 tablet, Rfl: 2    ticagrelor (BRILINTA) 90 MG, Take 1 tablet (90 mg total) by mouth every 12 (twelve) hours, Disp: 60 tablet, Rfl: 5    traZODone (DESYREL) 50 mg tablet, TAKE 1 TABLET BY MOUTH DAILY AT BEDTIME, Disp: 90 tablet, Rfl: 1    ACTIVE PROBLEM LIST:  Patient Active Problem List   Diagnosis    Basilar artery stenosis    CVA (cerebral vascular accident) (HCC)    Chronic GERD    Acute renal failure superimposed on stage 3 chronic kidney disease (HCC)    Claudication in peripheral vascular disease (HCC)    Type 2 diabetes mellitus with diabetic nephropathy (HCC)    Gout    Hx-TIA (transient ischemic attack)    Hypercholesteremia    Hyperlipidemia associated with type 2 diabetes mellitus  (HCC)    Hypertension    Hypertension associated with diabetes  (HCC)    Osteoarthritis    Polyneuropathy    Right renal artery stenosis (HCC)    Vertebrobasilar artery syndrome    Vitamin D deficiency     Statin intolerance    Lumbar disc herniation    Spondylosis of lumbar region without myelopathy or radiculopathy    Aortoiliac stenosis, left (HCC)    Lumbosacral spondylosis without myelopathy    Proteinuria    Hyperlipidemia, unspecified    Herniated lumbar intervertebral disc    Celiac artery stenosis (HCC) >70% stenosis in the celiac trunk    Abnormal CT of the chest suspicious for an infectious or inflammatory bronchiolitis.    Positive cardiac stress test  small, mildly severe, partially reversible myocardial perfusion defect of anterior and inferior wall     Femoral artery stenosis, right (HCC) 50-75% stenosis in the common femoral artery.    Mesenteric artery stenosis (HCC)    Median arcuate ligament syndrome (HCC)    Atherosclerosis of native arteries of extremities with rest pain, right leg (HCC)    Symptomatic carotid artery stenosis, left    Pulmonary nodule 7 mm groundglass opacity in the right upper lobe, unchanged since October 2019    Stenosis of left anterior descending artery Mid LAD 50-60% stenosis    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    Malignant neoplasm of overlapping sites of bladder (HCC)    Cervical radiculopathy    Stage 4 chronic kidney disease (HCC)    Hypertensive kidney disease with stage 4 chronic kidney disease (HCC)    Depression, recurrent (HCC)    Type 2 diabetes mellitus, without long-term current use of insulin (HCC)    Type 2 diabetes mellitus with diabetic peripheral angiopathy without gangrene (HCC)    Gastrointestinal stromal tumor (GIST) (HCC)    History of gastrointestinal stromal tumor (GIST)    Secondary hyperparathyroidism of renal origin (HCC)    Aortoiliac occlusive disease (HCC)    Hypertensive urgency    Dysarthria    Stage 3b chronic kidney disease (HCC)    Acute CVA (cerebrovascular accident) (HCC)    Primary hypertension    Smoking    Renal cyst    Encounter to discuss test results    Complex renal cyst     Encounter for follow-up surveillance of urothelial carcinoma of lower urinary tract    Carotid stenosis, asymptomatic, right    History of left common carotid artery stent placement    Hypertensive chronic kidney disease with stage 5 chronic kidney disease or end stage renal disease (HCC)       PAST MEDICAL HISTORY:  Past Medical History:   Diagnosis Date    Aphasia as late effect of cerebrovascular accident (CVA) 08/16/2023    Arthritis     Chronic kidney disease     Diabetes mellitus (HCC)     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/20/2021    Endometriosis     High cholesterol     History of left common carotid artery stent placement 10/27/2023    Hypertension     Kidney disease, chronic, stage III (moderate, EGFR 30-59 ml/min) (HCC)     Lumbar disc herniation     Neuropathy     Obesity (BMI 30-39.9) 12/04/2017    Obesity, morbid (HCC) 01/20/2021    Peripheral vascular disease (HCC)     Pneumonia     Shoulder injury     left    Spinal stenosis     Stroke ([facility]) 2015    Memory loss       PAST SURGICAL HISTORY:  Past Surgical History:   Procedure Laterality Date    CARDIAC CATHETERIZATION      CAROTID STENT Left 9/7/2023    Procedure: L TCAR;  Surgeon: Nicole Gallo MD;  Location: [facility];  Service: Vascular    COLONOSCOPY      FL RETROGRADE PYELOGRAM  4/6/2020    FRACTURE SURGERY Right     ankle    IR CAROTID STENT  9/7/2023    OVARIAN CYST SURGERY      CT CYSTO BLADDER W/URETERAL CATHETERIZATION Bilateral 4/6/2020    Procedure: CYSTOSCOPY WITH RETROGRADE PYELOGRAM;  Surgeon: Robert Mitchell MD;  [facility];  Service: Urology    CT CYSTO W/INSERT URETERAL STENT Right 4/6/2020    Procedure: INSERTION STENT URETERAL;  Surgeon: Robert Mitchell MD;  [facility];  Service: Urology    CT CYSTO W/REMOVAL OF TUMORS SMALL N/A 4/6/2020    Procedure: TRANSURETHRAL RESECTION OF BLADDER TUMOR (TURBT);  Surgeon: Robert Mitchell MD;  [facility];  Service:  Urology    ROTATOR CUFF REPAIR Left     TONSILLECTOMY         FAMILY HISTORY:  Family History   Problem Relation Age of Onset    Hypertension Mother     Heart disease Mother         Valvular    Hyperlipidemia Mother     Hypertension Father     Heart defect Father         Cardiomegaly    Stroke Sister         Cerebrovascular Accident    Arthritis Brother     Other Brother         Back Disorder       SOCIAL HISTORY:  Social History     Socioeconomic History    Marital status: /Civil Union     Spouse name: Not on file    Number of children: Not on file    Years of education: Not on file    Highest education level: Not on file   Occupational History    Occupation: retired   Tobacco Use    Smoking status: Former     Current packs/day: 0.00     Average packs/day: 2.0 packs/day for 54.6 years (109.1 ttl pk-yrs)     Types: Cigarettes     Start date: 1966     Quit date: 7/27/2020     Years since quitting: 3.7    Smokeless tobacco: Never   Vaping Use    Vaping status: Never Used   Substance and Sexual Activity    Alcohol use: Never    Drug use: Never    Sexual activity: Not Currently     Partners: Male   Other Topics Concern    Not on file   Social History Narrative    Occasional Caffeine Consumption     Social Determinants of Health     Financial Resource Strain: Not on file   Food Insecurity: No Food Insecurity (9/5/2023)    Hunger Vital Sign     Worried About Running Out of Food in the Last Year: Never true     Ran Out of Food in the Last Year: Never true   Transportation Needs: No Transportation Needs (9/5/2023)    PRAPARE - Transportation     Lack of Transportation (Medical): No     Lack of Transportation (Non-Medical): No   Physical Activity: Not on file   Stress: Not on file   Social Connections: Not on file   Intimate Partner Violence: Not on file   Housing Stability: Low Risk  (9/5/2023)    Housing Stability Vital Sign     Unable to Pay for Housing in the Last Year: No     Number of Places Lived in the Last  Year: 1     Unstable Housing in the Last Year: No       Review of Systems   Constitutional:  Negative for chills and fever.   HENT:  Negative for ear pain and sore throat.    Eyes:  Negative for pain and visual disturbance.   Respiratory:  Negative for cough and shortness of breath.    Cardiovascular:  Negative for chest pain and palpitations.   Gastrointestinal:  Negative for abdominal pain and vomiting.   Genitourinary:  Negative for dysuria and hematuria.   Musculoskeletal:  Positive for arthralgias and gait problem. Negative for back pain.   Skin:  Negative for color change and rash.   Neurological:  Negative for seizures and syncope.   All other systems reviewed and are negative.        Objective:  Vitals:    04/08/24 1343   BP: 116/68   BP Location: Left arm   Patient Position: Sitting   Cuff Size: Adult   Pulse: 72   Temp: 97.8 °F (36.6 °C)   TempSrc: Tympanic   SpO2: 98%   Weight: 63.5 kg (140 lb)   Height: 5' (1.524 m)     Body mass index is 27.34 kg/m².     Physical Exam  Vitals and nursing note reviewed.   Constitutional:       Appearance: Normal appearance. She is obese.   HENT:      Head: Normocephalic and atraumatic.   Cardiovascular:      Rate and Rhythm: Normal rate and regular rhythm.      Heart sounds: Normal heart sounds.   Pulmonary:      Effort: Pulmonary effort is normal.      Breath sounds: Normal breath sounds.   Musculoskeletal:         General: Normal range of motion.      Cervical back: Normal range of motion.      Right lower leg: No edema.      Left lower leg: No edema.   Skin:     General: Skin is warm and dry.   Neurological:      General: No focal deficit present.      Mental Status: She is alert and oriented to person, place, and time. Mental status is at baseline.      Cranial Nerves: Cranial nerve deficit and dysarthria present.      Sensory: Sensory deficit present.      Motor: Weakness present.      Gait: Gait abnormal.   Psychiatric:         Mood and Affect: Mood is anxious and  depressed. Affect is labile, flat and tearful.         Behavior: Behavior is agitated.           RESULTS:  Hemoglobin A1C   Date/Time Value Ref Range Status   04/08/2024 01:59 PM 5.5 6.5 Final   01/06/2024 11:42 AM 5.7 (H) Normal 4.0-5.6%; PreDiabetic 5.7-6.4%; Diabetic >=6.5%; Glycemic control for adults with diabetes <7.0% % Final   09/08/2020 09:21 AM 7.3 (H) 4.8 - 5.6 % Final     Comment:              Prediabetes: 5.7 - 6.4           Diabetes: >6.4           Glycemic control for adults with diabetes: <7.0       Cholesterol   Date/Time Value Ref Range Status   01/06/2024 11:42  See Comment mg/dL Final     Comment:     Cholesterol:         Pediatric <18 Years        Desirable          <170 mg/dL      Borderline High    170-199 mg/dL      High               >=200 mg/dL        Adult >=18 Years            Desirable        <200 mg/dL      Borderline High  200-239 mg/dL      High             >239 mg/dL       Cholesterol, Total   Date/Time Value Ref Range Status   09/08/2020 09:21  (H) 100 - 199 mg/dL Final     Triglycerides   Date/Time Value Ref Range Status   01/06/2024 11:42  (H) See Comment mg/dL Final     Comment:     Triglyceride:     0-9Y            <75mg/dL     10Y-17Y         <90 mg/dL       >=18Y     Normal          <150 mg/dL     Borderline High 150-199 mg/dL     High            200-499 mg/dL        Very High       >499 mg/dL    Specimen collection should occur prior to Metamizole administration due to the potential for falsely depressed results.   09/08/2020 09:21  (H) 0 - 149 mg/dL Final     HDL   Date/Time Value Ref Range Status   09/08/2020 09:21 AM 36 (L) >39 mg/dL Final     HDL, Direct   Date/Time Value Ref Range Status   01/06/2024 11:42 AM 49 (L) >=50 mg/dL Final     LDL Calculated   Date/Time Value Ref Range Status   01/06/2024 11:42  (H) 0 - 100 mg/dL Final     Comment:     LDL Cholesterol:     Optimal           <100 mg/dl     Near Optimal      100-129 mg/dl     Above  Optimal       Borderline High 130-159 mg/dl       High            160-189 mg/dl       Very High       >189 mg/dl         This screening LDL is a calculated result.   It does not have the accuracy of the Direct Measured LDL in the monitoring of patients with hyperlipidemia and/or statin therapy.   Direct Measure LDL (GLO486) must be ordered separately in these patients.   09/08/2020 09:21  (H) 0 - 99 mg/dL Final     Hemoglobin   Date/Time Value Ref Range Status   02/15/2024 07:15 AM 12.4 11.5 - 15.4 g/dL Final     Hematocrit   Date/Time Value Ref Range Status   02/15/2024 07:15 AM 37.9 34.8 - 46.1 % Final     Platelets   Date/Time Value Ref Range Status   02/15/2024 07:15  149 - 390 Thousands/uL Final     TSH 3RD GENERATON   Date/Time Value Ref Range Status   01/06/2024 11:42 AM 1.856 0.450 - 4.500 uIU/mL Final     Comment:     The recommended reference ranges for TSH during pregnancy are as follows:   First trimester 0.100 to 2.500 uIU/mL   Second trimester  0.200 to 3.000 uIU/mL   Third trimester 0.300 to 3.000 uIU/m    Note: Normal ranges may not apply to patients who are transgender, non-binary, or whose legal sex, sex at birth, and gender identity differ.  Adult TSH (3rd generation) reference range follows the recommended guidelines of the [employer] Association, January, 2020.     Free T4   Date/Time Value Ref Range Status   04/12/2021 09:59 AM 1.07 0.76 - 1.46 ng/dL Final     Comment:     Specimen collection should occur prior to Sulfasalazine administration due to the potential for falsely elevated results.     Sodium   Date/Time Value Ref Range Status   02/15/2024 07:15  135 - 147 mmol/L Final   10/20/2020 07:57  134 - 144 mmol/L Final     BUN   Date/Time Value Ref Range Status   02/15/2024 07:15 AM 26 (H) 5 - 25 mg/dL Final   10/20/2020 07:57 AM 45 (H) 8 - 27 mg/dL Final     Creatinine   Date/Time Value Ref Range Status   02/15/2024 07:15 AM 1.61 (H) 0.60 - 1.30 mg/dL Final      Comment:     Standardized to IDMS reference method      In chart    This note was created with voice recognition software.  Phonic, grammatical and spelling errors may be present within the note as a result.

## 2024-04-11 ENCOUNTER — POST-OP CHECK (OUTPATIENT)
Dept: URBAN - METROPOLITAN AREA CLINIC 6 | Facility: CLINIC | Age: 79
End: 2024-04-11

## 2024-04-11 DIAGNOSIS — Z79.4: ICD-10-CM

## 2024-04-11 DIAGNOSIS — H25.811: ICD-10-CM

## 2024-04-11 DIAGNOSIS — E11.3212: ICD-10-CM

## 2024-04-11 DIAGNOSIS — Z96.1: ICD-10-CM

## 2024-04-11 PROCEDURE — 99024 POSTOP FOLLOW-UP VISIT: CPT

## 2024-04-11 PROCEDURE — 92134 CPTRZ OPH DX IMG PST SGM RTA: CPT

## 2024-04-11 ASSESSMENT — TONOMETRY
OD_IOP_MMHG: 14
OS_IOP_MMHG: 12

## 2024-04-11 ASSESSMENT — VISUAL ACUITY
OU_CC: J5
OD_CC: 20/40-1
OS_CC: 20/100
OU_CC: 20/40-1

## 2024-04-17 ENCOUNTER — TELEPHONE (OUTPATIENT)
Dept: VASCULAR SURGERY | Facility: CLINIC | Age: 79
End: 2024-04-17

## 2024-04-17 NOTE — TELEPHONE ENCOUNTER
----- Message from Sonia Gabriel sent at 4/16/2024 11:58 AM EDT -----    ----- Message -----  From: Nicole Gallo MD  Sent: 4/8/2024   3:39 PM EDT  To: [facility]    Please setup for OV to discuss A-gram. Thanks.

## 2024-04-18 ENCOUNTER — APPOINTMENT (EMERGENCY)
Dept: VASCULAR ULTRASOUND | Facility: HOSPITAL | Age: 79
DRG: 571 | End: 2024-04-18
Payer: COMMERCIAL

## 2024-04-18 ENCOUNTER — APPOINTMENT (EMERGENCY)
Dept: RADIOLOGY | Facility: HOSPITAL | Age: 79
DRG: 571 | End: 2024-04-18
Payer: COMMERCIAL

## 2024-04-18 ENCOUNTER — CONSULT (OUTPATIENT)
Age: 79
End: 2024-04-18
Payer: COMMERCIAL

## 2024-04-18 ENCOUNTER — HOSPITAL ENCOUNTER (INPATIENT)
Facility: HOSPITAL | Age: 79
LOS: 1 days | Discharge: HOME HEALTH | DRG: 571 | End: 2024-04-20
Attending: STUDENT IN AN ORGANIZED HEALTH CARE EDUCATION/TRAINING PROGRAM | Admitting: STUDENT IN AN ORGANIZED HEALTH CARE EDUCATION/TRAINING PROGRAM
Payer: COMMERCIAL

## 2024-04-18 VITALS — HEIGHT: 60 IN | BODY MASS INDEX: 27.34 KG/M2

## 2024-04-18 DIAGNOSIS — E08.621 DIABETIC ULCER OF TOE OF RIGHT FOOT ASSOCIATED WITH DIABETES MELLITUS DUE TO UNDERLYING CONDITION, LIMITED TO BREAKDOWN OF SKIN (HCC): ICD-10-CM

## 2024-04-18 DIAGNOSIS — Z79.4 TYPE 2 DIABETES MELLITUS WITH DIABETIC NEPHROPATHY, WITH LONG-TERM CURRENT USE OF INSULIN (HCC): Primary | ICD-10-CM

## 2024-04-18 DIAGNOSIS — N18.32 ACUTE RENAL FAILURE SUPERIMPOSED ON STAGE 3B CHRONIC KIDNEY DISEASE, UNSPECIFIED ACUTE RENAL FAILURE TYPE (HCC): ICD-10-CM

## 2024-04-18 DIAGNOSIS — I12.9 HYPERTENSIVE KIDNEY DISEASE WITH STAGE 4 CHRONIC KIDNEY DISEASE (HCC): ICD-10-CM

## 2024-04-18 DIAGNOSIS — N18.4 HYPERTENSIVE KIDNEY DISEASE WITH STAGE 4 CHRONIC KIDNEY DISEASE (HCC): ICD-10-CM

## 2024-04-18 DIAGNOSIS — L03.115 CELLULITIS OF RIGHT FOOT: ICD-10-CM

## 2024-04-18 DIAGNOSIS — N17.9 ACUTE RENAL FAILURE SUPERIMPOSED ON STAGE 3B CHRONIC KIDNEY DISEASE, UNSPECIFIED ACUTE RENAL FAILURE TYPE (HCC): ICD-10-CM

## 2024-04-18 DIAGNOSIS — L03.90 CELLULITIS: Primary | ICD-10-CM

## 2024-04-18 DIAGNOSIS — E78.5 HYPERLIPIDEMIA ASSOCIATED WITH TYPE 2 DIABETES MELLITUS  (HCC): ICD-10-CM

## 2024-04-18 DIAGNOSIS — E11.69 HYPERLIPIDEMIA ASSOCIATED WITH TYPE 2 DIABETES MELLITUS  (HCC): ICD-10-CM

## 2024-04-18 DIAGNOSIS — L97.511 DIABETIC ULCER OF TOE OF RIGHT FOOT ASSOCIATED WITH DIABETES MELLITUS DUE TO UNDERLYING CONDITION, LIMITED TO BREAKDOWN OF SKIN (HCC): ICD-10-CM

## 2024-04-18 DIAGNOSIS — N25.81 SECONDARY HYPERPARATHYROIDISM OF RENAL ORIGIN (HCC): ICD-10-CM

## 2024-04-18 DIAGNOSIS — E11.21 TYPE 2 DIABETES MELLITUS WITH DIABETIC NEPHROPATHY, WITH LONG-TERM CURRENT USE OF INSULIN (HCC): Primary | ICD-10-CM

## 2024-04-18 DIAGNOSIS — I73.9 PAD (PERIPHERAL ARTERY DISEASE) (HCC): ICD-10-CM

## 2024-04-18 PROBLEM — I12.0 HYPERTENSIVE CHRONIC KIDNEY DISEASE WITH STAGE 5 CHRONIC KIDNEY DISEASE OR END STAGE RENAL DISEASE (HCC): Status: RESOLVED | Noted: 2024-01-08 | Resolved: 2024-04-18

## 2024-04-18 PROBLEM — N18.30 STAGE 3 CHRONIC KIDNEY DISEASE (HCC): Status: ACTIVE | Noted: 2024-04-18

## 2024-04-18 LAB
ALBUMIN SERPL BCP-MCNC: 4.1 G/DL (ref 3.5–5)
ALP SERPL-CCNC: 105 U/L (ref 34–104)
ALT SERPL W P-5'-P-CCNC: 8 U/L (ref 7–52)
ANION GAP SERPL CALCULATED.3IONS-SCNC: 10 MMOL/L (ref 4–13)
APTT PPP: 34 SECONDS (ref 23–37)
AST SERPL W P-5'-P-CCNC: 10 U/L (ref 13–39)
BASOPHILS # BLD AUTO: 0.05 THOUSANDS/ÂΜL (ref 0–0.1)
BASOPHILS NFR BLD AUTO: 1 % (ref 0–1)
BILIRUB SERPL-MCNC: 0.35 MG/DL (ref 0.2–1)
BUN SERPL-MCNC: 33 MG/DL (ref 5–25)
CALCIUM SERPL-MCNC: 9.5 MG/DL (ref 8.4–10.2)
CHLORIDE SERPL-SCNC: 105 MMOL/L (ref 96–108)
CO2 SERPL-SCNC: 27 MMOL/L (ref 21–32)
CREAT SERPL-MCNC: 1.44 MG/DL (ref 0.6–1.3)
EOSINOPHIL # BLD AUTO: 0.33 THOUSAND/ÂΜL (ref 0–0.61)
EOSINOPHIL NFR BLD AUTO: 4 % (ref 0–6)
ERYTHROCYTE [DISTWIDTH] IN BLOOD BY AUTOMATED COUNT: 13.5 % (ref 11.6–15.1)
GFR SERPL CREATININE-BSD FRML MDRD: 35 ML/MIN/1.73SQ M
GLUCOSE SERPL-MCNC: 148 MG/DL (ref 65–140)
HCT VFR BLD AUTO: 41.6 % (ref 34.8–46.1)
HGB BLD-MCNC: 13.9 G/DL (ref 11.5–15.4)
IMM GRANULOCYTES # BLD AUTO: 0.05 THOUSAND/UL (ref 0–0.2)
IMM GRANULOCYTES NFR BLD AUTO: 1 % (ref 0–2)
INR PPP: 0.92 (ref 0.84–1.19)
LACTATE SERPL-SCNC: 1.4 MMOL/L (ref 0.5–2)
LYMPHOCYTES # BLD AUTO: 1.5 THOUSANDS/ÂΜL (ref 0.6–4.47)
LYMPHOCYTES NFR BLD AUTO: 16 % (ref 14–44)
MCH RBC QN AUTO: 29.4 PG (ref 26.8–34.3)
MCHC RBC AUTO-ENTMCNC: 33.4 G/DL (ref 31.4–37.4)
MCV RBC AUTO: 88 FL (ref 82–98)
MONOCYTES # BLD AUTO: 0.67 THOUSAND/ÂΜL (ref 0.17–1.22)
MONOCYTES NFR BLD AUTO: 7 % (ref 4–12)
NEUTROPHILS # BLD AUTO: 6.58 THOUSANDS/ÂΜL (ref 1.85–7.62)
NEUTS SEG NFR BLD AUTO: 71 % (ref 43–75)
NRBC BLD AUTO-RTO: 0 /100 WBCS
PLATELET # BLD AUTO: 255 THOUSANDS/UL (ref 149–390)
PLATELET # BLD AUTO: 303 THOUSANDS/UL (ref 149–390)
PMV BLD AUTO: 9.7 FL (ref 8.9–12.7)
PMV BLD AUTO: 9.9 FL (ref 8.9–12.7)
POTASSIUM SERPL-SCNC: 3.8 MMOL/L (ref 3.5–5.3)
PROCALCITONIN SERPL-MCNC: 0.07 NG/ML
PROT SERPL-MCNC: 7.4 G/DL (ref 6.4–8.4)
PROTHROMBIN TIME: 12.9 SECONDS (ref 11.6–14.5)
RBC # BLD AUTO: 4.73 MILLION/UL (ref 3.81–5.12)
SODIUM SERPL-SCNC: 142 MMOL/L (ref 135–147)
WBC # BLD AUTO: 9.18 THOUSAND/UL (ref 4.31–10.16)

## 2024-04-18 PROCEDURE — 96365 THER/PROPH/DIAG IV INF INIT: CPT

## 2024-04-18 PROCEDURE — 96367 TX/PROPH/DG ADDL SEQ IV INF: CPT

## 2024-04-18 PROCEDURE — 85049 AUTOMATED PLATELET COUNT: CPT | Performed by: STUDENT IN AN ORGANIZED HEALTH CARE EDUCATION/TRAINING PROGRAM

## 2024-04-18 PROCEDURE — 85730 THROMBOPLASTIN TIME PARTIAL: CPT | Performed by: PHYSICIAN ASSISTANT

## 2024-04-18 PROCEDURE — 93926 LOWER EXTREMITY STUDY: CPT

## 2024-04-18 PROCEDURE — 99204 OFFICE O/P NEW MOD 45 MIN: CPT | Performed by: NURSE PRACTITIONER

## 2024-04-18 PROCEDURE — 83605 ASSAY OF LACTIC ACID: CPT | Performed by: PHYSICIAN ASSISTANT

## 2024-04-18 PROCEDURE — 85610 PROTHROMBIN TIME: CPT | Performed by: PHYSICIAN ASSISTANT

## 2024-04-18 PROCEDURE — 87040 BLOOD CULTURE FOR BACTERIA: CPT | Performed by: PHYSICIAN ASSISTANT

## 2024-04-18 PROCEDURE — 85025 COMPLETE CBC W/AUTO DIFF WBC: CPT | Performed by: PHYSICIAN ASSISTANT

## 2024-04-18 PROCEDURE — 84145 PROCALCITONIN (PCT): CPT | Performed by: PHYSICIAN ASSISTANT

## 2024-04-18 PROCEDURE — 99285 EMERGENCY DEPT VISIT HI MDM: CPT

## 2024-04-18 PROCEDURE — 73630 X-RAY EXAM OF FOOT: CPT

## 2024-04-18 PROCEDURE — 96361 HYDRATE IV INFUSION ADD-ON: CPT

## 2024-04-18 PROCEDURE — 99223 1ST HOSP IP/OBS HIGH 75: CPT | Performed by: STUDENT IN AN ORGANIZED HEALTH CARE EDUCATION/TRAINING PROGRAM

## 2024-04-18 PROCEDURE — 80053 COMPREHEN METABOLIC PANEL: CPT | Performed by: PHYSICIAN ASSISTANT

## 2024-04-18 PROCEDURE — 36415 COLL VENOUS BLD VENIPUNCTURE: CPT | Performed by: PHYSICIAN ASSISTANT

## 2024-04-18 RX ORDER — LOSARTAN POTASSIUM 50 MG/1
100 TABLET ORAL DAILY
Status: DISCONTINUED | OUTPATIENT
Start: 2024-04-19 | End: 2024-04-20 | Stop reason: HOSPADM

## 2024-04-18 RX ORDER — TRAZODONE HYDROCHLORIDE 50 MG/1
50 TABLET ORAL
Status: DISCONTINUED | OUTPATIENT
Start: 2024-04-18 | End: 2024-04-20 | Stop reason: HOSPADM

## 2024-04-18 RX ORDER — ESCITALOPRAM OXALATE 10 MG/1
20 TABLET ORAL DAILY
Status: DISCONTINUED | OUTPATIENT
Start: 2024-04-19 | End: 2024-04-20 | Stop reason: HOSPADM

## 2024-04-18 RX ORDER — HEPARIN SODIUM 5000 [USP'U]/ML
5000 INJECTION, SOLUTION INTRAVENOUS; SUBCUTANEOUS EVERY 8 HOURS SCHEDULED
Status: DISCONTINUED | OUTPATIENT
Start: 2024-04-18 | End: 2024-04-20 | Stop reason: HOSPADM

## 2024-04-18 RX ORDER — METRONIDAZOLE 500 MG/1
500 TABLET ORAL EVERY 8 HOURS SCHEDULED
Status: DISCONTINUED | OUTPATIENT
Start: 2024-04-18 | End: 2024-04-20

## 2024-04-18 RX ORDER — CLONIDINE 0.3 MG/24H
1 PATCH, EXTENDED RELEASE TRANSDERMAL WEEKLY
Status: DISCONTINUED | OUTPATIENT
Start: 2024-04-18 | End: 2024-04-20 | Stop reason: HOSPADM

## 2024-04-18 RX ORDER — LABETALOL 100 MG/1
300 TABLET, FILM COATED ORAL EVERY 12 HOURS SCHEDULED
Status: DISCONTINUED | OUTPATIENT
Start: 2024-04-18 | End: 2024-04-20 | Stop reason: HOSPADM

## 2024-04-18 RX ORDER — ACETAMINOPHEN 325 MG/1
650 TABLET ORAL EVERY 6 HOURS PRN
Status: DISCONTINUED | OUTPATIENT
Start: 2024-04-18 | End: 2024-04-20 | Stop reason: HOSPADM

## 2024-04-18 RX ORDER — ASPIRIN 81 MG/1
81 TABLET, CHEWABLE ORAL DAILY
Status: DISCONTINUED | OUTPATIENT
Start: 2024-04-19 | End: 2024-04-20 | Stop reason: HOSPADM

## 2024-04-18 RX ORDER — CINACALCET 30 MG/1
30 TABLET, FILM COATED ORAL EVERY OTHER DAY
Status: DISCONTINUED | OUTPATIENT
Start: 2024-04-19 | End: 2024-04-20 | Stop reason: HOSPADM

## 2024-04-18 RX ORDER — AMLODIPINE BESYLATE 5 MG/1
5 TABLET ORAL DAILY
Status: DISCONTINUED | OUTPATIENT
Start: 2024-04-18 | End: 2024-04-20 | Stop reason: HOSPADM

## 2024-04-18 RX ORDER — LINAGLIPTIN 5 MG/1
5 TABLET, FILM COATED ORAL DAILY
Qty: 90 TABLET | Refills: 1 | Status: SHIPPED | OUTPATIENT
Start: 2024-04-18

## 2024-04-18 RX ORDER — CEFAZOLIN SODIUM 2 G/50ML
2000 SOLUTION INTRAVENOUS EVERY 12 HOURS
Status: DISCONTINUED | OUTPATIENT
Start: 2024-04-18 | End: 2024-04-20 | Stop reason: HOSPADM

## 2024-04-18 RX ORDER — ALLOPURINOL 100 MG/1
50 TABLET ORAL EVERY OTHER DAY
Status: DISCONTINUED | OUTPATIENT
Start: 2024-04-19 | End: 2024-04-20 | Stop reason: HOSPADM

## 2024-04-18 RX ORDER — PRAVASTATIN SODIUM 80 MG/1
80 TABLET ORAL
Status: DISCONTINUED | OUTPATIENT
Start: 2024-04-19 | End: 2024-04-20 | Stop reason: HOSPADM

## 2024-04-18 RX ORDER — INSULIN LISPRO 100 [IU]/ML
1-5 INJECTION, SOLUTION INTRAVENOUS; SUBCUTANEOUS
Status: DISCONTINUED | OUTPATIENT
Start: 2024-04-19 | End: 2024-04-20 | Stop reason: HOSPADM

## 2024-04-18 RX ADMIN — CEFEPIME 2000 MG: 2 INJECTION, POWDER, FOR SOLUTION INTRAVENOUS at 19:24

## 2024-04-18 RX ADMIN — TICAGRELOR 90 MG: 90 TABLET ORAL at 22:56

## 2024-04-18 RX ADMIN — CEFAZOLIN SODIUM 2000 MG: 2 SOLUTION INTRAVENOUS at 22:12

## 2024-04-18 RX ADMIN — LABETALOL HYDROCHLORIDE 300 MG: 100 TABLET, FILM COATED ORAL at 22:55

## 2024-04-18 RX ADMIN — TRAZODONE HYDROCHLORIDE 50 MG: 50 TABLET ORAL at 22:56

## 2024-04-18 RX ADMIN — AMLODIPINE BESYLATE 5 MG: 5 TABLET ORAL at 22:56

## 2024-04-18 RX ADMIN — VANCOMYCIN HYDROCHLORIDE 1500 MG: 5 INJECTION, POWDER, LYOPHILIZED, FOR SOLUTION INTRAVENOUS at 20:32

## 2024-04-18 RX ADMIN — SODIUM CHLORIDE 1000 ML: 0.9 INJECTION, SOLUTION INTRAVENOUS at 19:23

## 2024-04-18 RX ADMIN — METRONIDAZOLE 500 MG: 500 TABLET ORAL at 22:09

## 2024-04-18 NOTE — ASSESSMENT & PLAN NOTE
Well controlled on 80 mg of pravastatin nightly.   Lab Results   Component Value Date    HGBA1C 5.5 04/08/2024

## 2024-04-18 NOTE — ASSESSMENT & PLAN NOTE
Patient is very well controlled based on A1C however, given age and overall health, I am concerned she is too tightly controlled. Recommend switching from 8 units of Lantus nightly to 5 mg of tradjenta daily. Counseled on MOA as well as potential s/e including GI upset and sinus congestion. Explained that this medication is safe in the setting of CKD. I would not recommend SGLT-2 at this time especially as patient has current, active toe infection, for which I have recommended an ED evaluation. See Cellulitis.   Continue healthy diet, regular physical activity, and good hydration with water. Test blodod sugars at least three times weekly. Aim to maintain fasting blood sugars between . Aim to keep 2 hour postpranidal blood sugar <180 mg/dL. Patient knows to notify me with persistent hyperglycemia or episodes of hypoglycemia.  F/U in 4-6 months. If patient tolerated tradjenta well and maintains good glycemic control, will discharge back to PCP for management.   Lab Results   Component Value Date    HGBA1C 5.5 04/08/2024

## 2024-04-18 NOTE — ASSESSMENT & PLAN NOTE
Lab Results   Component Value Date    EGFR 30 02/15/2024    EGFR 36 01/06/2024    EGFR 27 11/02/2023    CREATININE 1.61 (H) 02/15/2024    CREATININE 1.39 (H) 01/06/2024    CREATININE 1.75 (H) 11/02/2023   Following closely with nephrology.

## 2024-04-18 NOTE — ED PROVIDER NOTES
"History  Chief Complaint   Patient presents with    Wound Check     Hx of peripheral artery disease and DMII, reports wound to head of right big toe worsening over the past two weeks with circular black area around size of nickel with green pus in triage. 1+ pedal pulse to foot, reports BG has been \"really good\" last Ha1c was 5.5 and is being taken off regular insulin per patient soon.      78-year-old female patient here for evaluation of possible wound infection.  She has history of peripheral arterial disease, diabetes and chronic nonhealing wounds of her feet.  Over the past few days she has had increasing redness and swelling in the right foot.  She denies any fevers or chills.  Apparently she made mention to her primary care provider who wanted her to talk to her nephrologist before starting antibiotics.  She denies any calf pain or swelling.  No chest pain or shortness of breath.      History provided by:  Patient   used: No    Wound Check         Prior to Admission Medications   Prescriptions Last Dose Informant Patient Reported? Taking?   B-D ULTRAFINE III SHORT PEN 31G X 8 MM MISC  Self, Child No No   Sig: USE TWICE A DAY   Glucagon 1 MG/0.2ML SOAJ  Self, Child Yes No   Sig: Inject 1 mg under the skin if needed (low blood sugar)   Lantus SoloStar 100 units/mL SOPN  Self, Child Yes No   Si UNITS SUBCUTANEOUS AT BEDTIME   acetaminophen (TYLENOL) 500 mg tablet  Self, Child Yes No   Sig: Take 650 mg by mouth every 6 (six) hours as needed for mild pain   allopurinol (ZYLOPRIM) 100 mg tablet  Self, Child No No   Sig: TAKE 1 TABLET BY MOUTH EVERY DAY   amLODIPine (NORVASC) 5 mg tablet   No No   Sig: Take 1 tablet (5 mg total) by mouth daily Primary hypertension [I10]   aspirin 81 mg chewable tablet  Self, Child Yes No   Sig: Chew 81 mg daily   cinacalcet (SENSIPAR) 30 mg tablet   No No   Sig: Take 1 tablet (30 mg total) by mouth every other day   cloNIDine (CATAPRES-TTS-3) 0.3 mg/24 hr   " No No   Sig: Place 1 patch (0.3 mg total) on the skin over 7 days once a week   escitalopram (LEXAPRO) 20 mg tablet   No No   Sig: Take 1 tablet (20 mg total) by mouth daily   labetalol (NORMODYNE) 300 mg tablet  Self, Child No No   Sig: Take 1 tablet (300 mg total) by mouth every 12 (twelve) hours   lidocaine (LIDODERM) 5 %  Self, Child Yes No   Sig: Apply 1 patch topically daily Remove & Discard patch within 12 hours or as directed by MD   linaGLIPtin (Tradjenta) 5 MG TABS   No No   Sig: Take 5 mg by mouth daily   losartan (COZAAR) 100 MG tablet  Self, Child No No   Sig: Take 1 tablet (100 mg total) by mouth daily   pravastatin (PRAVACHOL) 80 mg tablet   No No   Sig: TAKE 1 TABLET BY MOUTH DAILY WITH DINNER   ticagrelor (BRILINTA) 90 MG   No No   Sig: Take 1 tablet (90 mg total) by mouth every 12 (twelve) hours   traZODone (DESYREL) 50 mg tablet  Self, Child No No   Sig: TAKE 1 TABLET BY MOUTH DAILY AT BEDTIME      Facility-Administered Medications: None       Past Medical History:   Diagnosis Date    Aphasia as late effect of cerebrovascular accident (CVA) 08/16/2023    Arthritis     Chronic kidney disease     Diabetes mellitus (HCC)     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/20/2021    Endometriosis     High cholesterol     History of left common carotid artery stent placement 10/27/2023    Hypertension     Kidney disease, chronic, stage III (moderate, EGFR 30-59 ml/min) ([facility])     Lumbar disc herniation     Neuropathy     Obesity (BMI 30-39.9) 12/04/2017    Obesity, morbid (HCC) 01/20/2021    Peripheral vascular disease (HCC)     Pneumonia     Shoulder injury     left    Spinal stenosis     Stroke (HCC) 2015    Memory loss       Past Surgical History:   Procedure Laterality Date    CARDIAC CATHETERIZATION      CAROTID STENT Left 9/7/2023    Procedure: L TCAR;  Surgeon: Nicole Gallo MD;  Location: [facility];  Service: Vascular    COLONOSCOPY      FL RETROGRADE PYELOGRAM   4/6/2020    FRACTURE SURGERY Right     ankle    IR CAROTID STENT  9/7/2023    OVARIAN CYST SURGERY      IN CYSTO BLADDER W/URETERAL CATHETERIZATION Bilateral 4/6/2020    Procedure: CYSTOSCOPY WITH RETROGRADE PYELOGRAM;  Surgeon: Robert Mithcell MD;  Location: [facility];  Service: Urology    IN CYSTO W/INSERT URETERAL STENT Right 4/6/2020    Procedure: INSERTION STENT URETERAL;  Surgeon: Robert Mitchell MD;  [facility];  Service: Urology    IN CYSTO W/REMOVAL OF TUMORS SMALL N/A 4/6/2020    Procedure: TRANSURETHRAL RESECTION OF BLADDER TUMOR (TURBT);  Surgeon: Robert Mitchell MD;  [facility];  Service: Urology    ROTATOR CUFF REPAIR Left     TONSILLECTOMY         Family History   Problem Relation Age of Onset    Hypertension Mother     Heart disease Mother         Valvular    Hyperlipidemia Mother     Hypertension Father     Heart defect Father         Cardiomegaly    Stroke Sister         Cerebrovascular Accident    Arthritis Brother     Other Brother         Back Disorder     I have reviewed and agree with the history as documented.    E-Cigarette/Vaping    E-Cigarette Use Never User      E-Cigarette/Vaping Substances    Nicotine No     THC No     CBD No     Flavoring No     Other No     Unknown No      Social History     Tobacco Use    Smoking status: Former     Current packs/day: 0.00     Average packs/day: 2.0 packs/day for 54.6 years (109.1 ttl pk-yrs)     Types: Cigarettes     Start date: 1966     Quit date: 7/27/2020     Years since quitting: 3.7    Smokeless tobacco: Never   Vaping Use    Vaping status: Never Used   Substance Use Topics    Alcohol use: Never    Drug use: Never       Review of Systems   Constitutional:  Negative for chills and fever.   HENT:  Negative for ear pain and sore throat.    Eyes:  Negative for pain and visual disturbance.   Respiratory:  Negative for cough and shortness of breath.    Cardiovascular:  Negative for chest pain and palpitations.    Gastrointestinal:  Negative for abdominal pain and vomiting.   Genitourinary:  Negative for dysuria and hematuria.   Musculoskeletal:  Negative for arthralgias and back pain.   Skin:  Positive for wound. Negative for color change and rash.        Wound right foot.   Neurological:  Negative for seizures and syncope.   All other systems reviewed and are negative.      Physical Exam  Physical Exam  Vitals and nursing note reviewed.   Constitutional:       General: She is not in acute distress.     Appearance: She is well-developed.   HENT:      Head: Normocephalic and atraumatic.   Eyes:      Conjunctiva/sclera: Conjunctivae normal.   Cardiovascular:      Rate and Rhythm: Normal rate and regular rhythm.      Heart sounds: No murmur heard.  Pulmonary:      Effort: Pulmonary effort is normal. No respiratory distress.      Breath sounds: Normal breath sounds.   Abdominal:      Palpations: Abdomen is soft.      Tenderness: There is no abdominal tenderness.   Musculoskeletal:         General: No swelling.      Cervical back: Neck supple.   Skin:     General: Skin is warm and dry.      Capillary Refill: Capillary refill takes less than 2 seconds.      Comments: Erythema and warmth.    Neurological:      Mental Status: She is alert.   Psychiatric:         Mood and Affect: Mood normal.         Vital Signs  ED Triage Vitals [04/18/24 1747]   Temperature Pulse Respirations Blood Pressure SpO2   98.5 °F (36.9 °C) 67 18 (!) 187/79 97 %      Temp Source Heart Rate Source Patient Position - Orthostatic VS BP Location FiO2 (%)   Temporal Monitor Sitting Left arm --      Pain Score       --           Vitals:    04/18/24 1747   BP: (!) 187/79   Pulse: 67   Patient Position - Orthostatic VS: Sitting         Visual Acuity      ED Medications  Medications   vancomycin (VANCOCIN) 1500 mg in sodium chloride 0.9% 250 mL IVPB (has no administration in time range)   cefepime (MAXIPIME) 2 g/50 mL dextrose IVPB (2,000 mg Intravenous New Bag  4/18/24 1924)   sodium chloride 0.9 % bolus 1,000 mL (1,000 mL Intravenous New Bag 4/18/24 1923)       Diagnostic Studies  Results Reviewed       Procedure Component Value Units Date/Time    CBC and differential [[id number]] Collected: 04/18/24 1918    Lab Status: In process Specimen: Blood from Arm, Right Updated: 04/18/24 1930    Lactic acid [[id number]] Collected: 04/18/24 1918    Lab Status: In process Specimen: Blood from Arm, Right Updated: 04/18/24 1930    Protime-INR [[id number]] Collected: 04/18/24 1918    Lab Status: In process Specimen: Blood from Arm, Right Updated: 04/18/24 1930    APTT [[id number]] Collected: 04/18/24 1918    Lab Status: In process Specimen: Blood from Arm, Right Updated: 04/18/24 1930    Comprehensive metabolic panel [[id number]] Collected: 04/18/24 1918    Lab Status: In process Specimen: Blood from Arm, Right Updated: 04/18/24 1930    Procalcitonin [[id number]] Collected: 04/18/24 1918    Lab Status: In process Specimen: Blood from Arm, Right Updated: 04/18/24 1930    Blood culture #2 [[id number]] Collected: 04/18/24 1918    Lab Status: In process Specimen: Blood from Hand, Right Updated: 04/18/24 1930    Blood culture #1 [[id number]] Collected: 04/18/24 1918    Lab Status: In process Specimen: Blood from Arm, Right Updated: 04/18/24 1930                   XR foot 3+ views RIGHT    (Results Pending)   VAS ARTERIAL DUPLEX-LOWER LIMB UNILATERAL    (Results Pending)              Procedures  Procedures         ED Course                                             Medical Decision Making  Amount and/or Complexity of Data Reviewed  Labs: ordered.  Radiology: ordered.             Disposition  Final diagnoses:   None     ED Disposition       None          Follow-up Information    None         Patient's Medications   Discharge Prescriptions    No medications on file       No discharge procedures on file.    PDMP Review         Value Time User    PDMP Reviewed  Yes 4/6/2020  8:10 AM Robert Mitchell,  MD            ED Provider  Electronically Signed by

## 2024-04-18 NOTE — ASSESSMENT & PLAN NOTE
Lab Results   Component Value Date    EGFR 30 02/15/2024    EGFR 36 01/06/2024    EGFR 27 11/02/2023    CREATININE 1.61 (H) 02/15/2024    CREATININE 1.39 (H) 01/06/2024    CREATININE 1.75 (H) 11/02/2023   /68. Continue 300 mg of labetalol twice daily clonidine patch, and 5 mg of amlodipine daily.

## 2024-04-18 NOTE — PATIENT INSTRUCTIONS
Stop the 8 units of Lantus  Start 5 mg of tradjenta daily  Test blood sugar at least 3x weekly  Good range for blood sugars are  mg/dL.

## 2024-04-18 NOTE — PROGRESS NOTES
New Patient Progress Note    Chief Complaint:  Diabetes Consult    Impression & Plan:    Problem List Items Addressed This Visit       Acute renal failure superimposed on stage 3 chronic kidney disease (HCC)     Lab Results   Component Value Date    EGFR 30 02/15/2024    EGFR 36 01/06/2024    EGFR 27 11/02/2023    CREATININE 1.61 (H) 02/15/2024    CREATININE 1.39 (H) 01/06/2024    CREATININE 1.75 (H) 11/02/2023   Following closely with nephrology.          Type 2 diabetes mellitus with diabetic nephropathy (HCC) - Primary     Patient is very well controlled based on A1C however, given age and overall health, I am concerned she is too tightly controlled. Recommend switching from 8 units of Lantus nightly to 5 mg of tradjenta daily. Counseled on MOA as well as potential s/e including GI upset and sinus congestion. Explained that this medication is safe in the setting of CKD. I would not recommend SGLT-2 at this time especially as patient has current, active toe infection, for which I have recommended an ED evaluation. See Cellulitis.   Continue healthy diet, regular physical activity, and good hydration with water. Test blodod sugars at least three times weekly. Aim to maintain fasting blood sugars between . Aim to keep 2 hour postpranidal blood sugar <180 mg/dL. Patient knows to notify me with persistent hyperglycemia or episodes of hypoglycemia.  F/U in 4-6 months. If patient tolerated tradjenta well and maintains good glycemic control, will discharge back to PCP for management.   Lab Results   Component Value Date    HGBA1C 5.5 04/08/2024            Relevant Medications    linaGLIPtin (Tradjenta) 5 MG TABS    Other Relevant Orders    Hemoglobin A1C    Basic metabolic panel    Ambulatory referral to Podiatry    Hyperlipidemia associated with type 2 diabetes mellitus  (HCC)     Well controlled on 80 mg of pravastatin nightly.   Lab Results   Component Value Date    HGBA1C 5.5 04/08/2024            Relevant  Medications    linaGLIPtin (Tradjenta) 5 MG TABS    Hypertensive kidney disease with stage 4 chronic kidney disease (HCC)     Lab Results   Component Value Date    EGFR 30 02/15/2024    EGFR 36 01/06/2024    EGFR 27 11/02/2023    CREATININE 1.61 (H) 02/15/2024    CREATININE 1.39 (H) 01/06/2024    CREATININE 1.75 (H) 11/02/2023   /68. Continue 300 mg of labetalol twice daily clonidine patch, and 5 mg of amlodipine daily.         Secondary hyperparathyroidism of renal origin (HCC)     Managed by nephrology.  Continue 30 mg of Cinacalcet every other day.         Diabetic ulcer of toe of right foot associated with diabetes mellitus due to underlying condition, limited to breakdown of skin (HCC)     Physical exam demonstrates significant ulceration and infection of right great toe.  Concerns for spreading cellulitis as remainder of toes positive for erythema and edema. Provided with ASAP referral to podiatry. In the meantime, recommend going to ED directly from appointment for evaluation as I suspect patient will need at least IV antibiotics as well as debridement.   Lab Results   Component Value Date    HGBA1C 5.5 04/08/2024            Relevant Medications    linaGLIPtin (Tradjenta) 5 MG TABS    Other Relevant Orders    Ambulatory referral to Podiatry       History of Present Illness:   Anamaria Parnell is a 78 y.o. female with Type 2 diabetes presenting to the office today for consultation.    PMH includes CKD IV, CVA, HTN, HLD, PVD.    Denies FH of T2DM. Denies hospitalizations for hyper/hypoglycemia.     Component      Latest Ref Rng 9/3/2023 1/6/2024 4/8/2024   Hemoglobin A1C      6.5  7.3 (H)  5.7 (H)  5.5       Legend:  (H) High    Component      Latest Ref Rng 1/6/2024 2/15/2024   GFR, Calculated      ml/min/1.73sq m 36  30        Patient reports initial diagnosis of T2DM in 2020 however she did not start any therapy for this until 2022 when A1C increased significantly to > 9%. She was hospitalized in 2023  at which point insulin was initiated. She has since made significant changes to her diet and is now taking only 8 units of Lantus nightly.     Not testing blood sugars regularly. No data to review. Denies symptoms of hyperglycemia or hypoglycemia. Does report changeable appetite. May not eat much throughout the day.         Current regimen:    Lantus 8 units nightly  Humalog       Patient Active Problem List   Diagnosis    Basilar artery stenosis    CVA (cerebral vascular accident) ([facility])    Chronic GERD    Acute renal failure superimposed on stage 3 chronic kidney disease ([facility])    Claudication in peripheral vascular disease ([facility])    Type 2 diabetes mellitus with diabetic nephropathy ([facility])    Gout    Hx-TIA (transient ischemic attack)    Hypercholesteremia    Hyperlipidemia associated with type 2 diabetes mellitus  (HCC)    Hypertension    Hypertension associated with diabetes  (HCC)    Osteoarthritis    Polyneuropathy    Right renal artery stenosis ([facility])    Vertebrobasilar artery syndrome    Vitamin D deficiency    Statin intolerance    Lumbar disc herniation    Spondylosis of lumbar region without myelopathy or radiculopathy    Aortoiliac stenosis, left (HCC)    Lumbosacral spondylosis without myelopathy    Proteinuria    Hyperlipidemia, unspecified    Herniated lumbar intervertebral disc    Celiac artery stenosis (HCC) >70% stenosis in the celiac trunk    Abnormal CT of the chest suspicious for an infectious or inflammatory bronchiolitis.    Positive cardiac stress test  small, mildly severe, partially reversible myocardial perfusion defect of anterior and inferior wall     Femoral artery stenosis, right (HCC) 50-75% stenosis in the common femoral artery.    Mesenteric artery stenosis ([facility])    Median arcuate ligament syndrome ([facility])    Atherosclerosis of native arteries of extremities with rest pain, right leg (HCC)    Symptomatic carotid artery stenosis, left    Pulmonary nodule 7 mm groundglass opacity in the  right upper lobe, unchanged since October 2019    Stenosis of left anterior descending artery Mid LAD 50-60% stenosis    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    Malignant neoplasm of overlapping sites of bladder (HCC)    Cervical radiculopathy    Stage 4 chronic kidney disease (HCC)    Hypertensive kidney disease with stage 4 chronic kidney disease (HCC)    Depression, recurrent (HCC)    Type 2 diabetes mellitus, without long-term current use of insulin (HCC)    Type 2 diabetes mellitus with diabetic peripheral angiopathy without gangrene (HCC)    Gastrointestinal stromal tumor (GIST) (HCC)    History of gastrointestinal stromal tumor (GIST)    Secondary hyperparathyroidism of renal origin (HCC)    Aortoiliac occlusive disease (HCC)    Hypertensive urgency    Dysarthria    Stage 3b chronic kidney disease (HCC)    Acute CVA (cerebrovascular accident) (HCC)    Primary hypertension    Smoking    Renal cyst    Encounter to discuss test results    Complex renal cyst    Encounter for follow-up surveillance of urothelial carcinoma of lower urinary tract    Carotid stenosis, asymptomatic, right    History of left common carotid artery stent placement    Diabetic ulcer of toe of right foot associated with diabetes mellitus due to underlying condition, limited to breakdown of skin (HCC)      Past Medical History:   Diagnosis Date    Aphasia as late effect of cerebrovascular accident (CVA) 08/16/2023    Arthritis     Chronic kidney disease     Diabetes mellitus (HCC)     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/20/2021    Endometriosis     High cholesterol     History of left common carotid artery stent placement 10/27/2023    Hypertension     Kidney disease, chronic, stage III (moderate, EGFR 30-59 ml/min) (HCC)     Lumbar disc herniation     Neuropathy     Obesity (BMI 30-39.9) 12/04/2017    Obesity, morbid (HCC) 01/20/2021    Peripheral  vascular disease (HCC)     Pneumonia     Shoulder injury     left    Spinal stenosis     Stroke (HCC) 2015    Memory loss      Past Surgical History:   Procedure Laterality Date    CARDIAC CATHETERIZATION      CAROTID STENT Left 9/7/2023    Procedure: L TCAR;  Surgeon: Nicole Gallo MD;  Location: [facility];  Service: Vascular    COLONOSCOPY      FL RETROGRADE PYELOGRAM  4/6/2020    FRACTURE SURGERY Right     ankle    IR CAROTID STENT  9/7/2023    OVARIAN CYST SURGERY      MD CYSTO BLADDER W/URETERAL CATHETERIZATION Bilateral 4/6/2020    Procedure: CYSTOSCOPY WITH RETROGRADE PYELOGRAM;  Surgeon: Robert Mitchell MD;  Location: [facility];  Service: Urology    MD CYSTO W/INSERT URETERAL STENT Right 4/6/2020    Procedure: INSERTION STENT URETERAL;  Surgeon: Robert Mitchell MD;  Location[facility];  Service: Urology    MD CYSTO W/REMOVAL OF TUMORS SMALL N/A 4/6/2020    Procedure: TRANSURETHRAL RESECTION OF BLADDER TUMOR (TURBT);  Surgeon: Robert Mitchell MD;  Location: [facility];  Service: Urology    ROTATOR CUFF REPAIR Left     TONSILLECTOMY        Family History   Problem Relation Age of Onset    Hypertension Mother     Heart disease Mother         Valvular    Hyperlipidemia Mother     Hypertension Father     Heart defect Father         Cardiomegaly    Stroke Sister         Cerebrovascular Accident    Arthritis Brother     Other Brother         Back Disorder     Social History     Tobacco Use    Smoking status: Former     Current packs/day: 0.00     Average packs/day: 2.0 packs/day for 54.6 years (109.1 ttl pk-yrs)     Types: Cigarettes     Start date: 1966     Quit date: 7/27/2020     Years since quitting: 3.7    Smokeless tobacco: Never   Substance Use Topics    Alcohol use: Never     Allergies   Allergen Reactions    Fenofibrate Other (See Comments)      blood in urine  hx  Kidney Failure    Colesevelam Other (See Comments)      leg pains    Colestipol Itching and Other (See Comments)      Swelling  lower legs    Ezetimibe GI Intolerance    Statins Myalgia         Current Outpatient Medications:     acetaminophen (TYLENOL) 500 mg tablet, Take 650 mg by mouth every 6 (six) hours as needed for mild pain, Disp: , Rfl:     allopurinol (ZYLOPRIM) 100 mg tablet, TAKE 1 TABLET BY MOUTH EVERY DAY, Disp: 90 tablet, Rfl: 2    amLODIPine (NORVASC) 5 mg tablet, Take 1 tablet (5 mg total) by mouth daily Primary hypertension [I10], Disp: 90 tablet, Rfl: 0    aspirin 81 mg chewable tablet, Chew 81 mg daily, Disp: , Rfl:     B-D ULTRAFINE III SHORT PEN 31G X 8 MM MISC, USE TWICE A DAY, Disp: 180 each, Rfl: 2    cinacalcet (SENSIPAR) 30 mg tablet, Take 1 tablet (30 mg total) by mouth every other day, Disp: 45 tablet, Rfl: 2    cloNIDine (CATAPRES-TTS-3) 0.3 mg/24 hr, Place 1 patch (0.3 mg total) on the skin over 7 days once a week, Disp: 12 patch, Rfl: 1    escitalopram (LEXAPRO) 20 mg tablet, Take 1 tablet (20 mg total) by mouth daily, Disp: 90 tablet, Rfl: 0    Glucagon 1 MG/0.2ML SOAJ, Inject 1 mg under the skin if needed (low blood sugar), Disp: , Rfl:     labetalol (NORMODYNE) 300 mg tablet, Take 1 tablet (300 mg total) by mouth every 12 (twelve) hours, Disp: 180 tablet, Rfl: 1    Lantus SoloStar 100 units/mL SOPN, 9 UNITS SUBCUTANEOUS AT BEDTIME, Disp: , Rfl:     lidocaine (LIDODERM) 5 %, Apply 1 patch topically daily Remove & Discard patch within 12 hours or as directed by MD, Disp: , Rfl:     linaGLIPtin (Tradjenta) 5 MG TABS, Take 5 mg by mouth daily, Disp: 90 tablet, Rfl: 1    losartan (COZAAR) 100 MG tablet, Take 1 tablet (100 mg total) by mouth daily, Disp: 90 tablet, Rfl: 1    pravastatin (PRAVACHOL) 80 mg tablet, TAKE 1 TABLET BY MOUTH DAILY WITH DINNER, Disp: 90 tablet, Rfl: 2    ticagrelor (BRILINTA) 90 MG, Take 1 tablet (90 mg total) by mouth every 12 (twelve) hours, Disp: 60 tablet, Rfl: 5    traZODone (DESYREL) 50 mg tablet, TAKE 1 TABLET BY MOUTH DAILY AT BEDTIME, Disp: 90 tablet, Rfl: 1    Review of Systems    Constitutional:  Negative for activity change, appetite change, fatigue and unexpected weight change.   HENT:  Negative for dental problem, sore throat, trouble swallowing and voice change.    Eyes:  Negative for visual disturbance.   Respiratory:  Negative for cough, chest tightness and shortness of breath.    Cardiovascular:  Negative for chest pain, palpitations and leg swelling.   Gastrointestinal:  Negative for constipation, diarrhea, nausea and vomiting.   Endocrine: Negative for polydipsia, polyphagia and polyuria.   Genitourinary:  Negative for frequency.   Musculoskeletal:  Negative for arthralgias, back pain, gait problem and myalgias.   Skin:  Positive for color change and wound.   Allergic/Immunologic: Positive for environmental allergies. Negative for food allergies.   Neurological:  Positive for weakness and numbness. Negative for dizziness, light-headedness and headaches.   Psychiatric/Behavioral:  Negative for decreased concentration, dysphoric mood and sleep disturbance. The patient is not nervous/anxious.        Physical Exam:  Body mass index is 27.34 kg/m².  Ht 5' (1.524 m)   LMP  (LMP Unknown)   BMI 27.34 kg/m²    Wt Readings from Last 3 Encounters:   04/08/24 63.5 kg (140 lb)   02/28/24 62.6 kg (138 lb)   01/08/24 60.6 kg (133 lb 9.6 oz)       Physical Exam  Vitals reviewed.   Constitutional:       General: She is not in acute distress.     Appearance: She is well-developed. She is not ill-appearing.   HENT:      Head: Normocephalic and atraumatic.   Eyes:      Pupils: Pupils are equal, round, and reactive to light.   Neck:      Thyroid: No thyromegaly.   Cardiovascular:      Rate and Rhythm: Normal rate.      Pulses: Pulses are weak.           Dorsalis pedis pulses are 1+ on the right side and 1+ on the left side.        Posterior tibial pulses are 1+ on the right side and 1+ on the left side.   Pulmonary:      Effort: Pulmonary effort is normal.   Musculoskeletal:      Cervical back:  Normal range of motion and neck supple.      Right lower leg: No edema.      Left lower leg: No edema.        Feet:    Feet:      Right foot:      Skin integrity: Ulcer, erythema and callus present. No skin breakdown, warmth or dry skin.      Left foot:      Skin integrity: No ulcer, skin breakdown, erythema, warmth, callus or dry skin.   Lymphadenopathy:      Cervical: No cervical adenopathy.   Skin:     General: Skin is warm and dry.      Capillary Refill: Capillary refill takes less than 2 seconds.   Neurological:      Mental Status: She is alert and oriented to person, place, and time.      Gait: Gait (in WC) normal.   Psychiatric:         Mood and Affect: Mood normal.         Behavior: Behavior normal.       Patient's shoes and socks removed.    Right Foot/Ankle   Right Foot Inspection  Skin Exam: skin normal, skin intact, callus, erythema, abnormal color, ulcer and callus. No dry skin, no warmth, no maceration and no pre-ulcer.     Toe Exam: ROM and strength within normal limits, swelling, tenderness, erythema and right toe deformity.     Vascular  Capillary refills: elevated  The right DP pulse is 1+. The right PT pulse is 1+.     Left Foot/Ankle  Left Foot Inspection  Skin Exam: skin normal and skin intact. No dry skin, no warmth, no erythema, no maceration, normal color, no pre-ulcer, no ulcer and no callus.     Toe Exam: ROM and strength within normal limits.     Vascular  Capillary refills: < 3 seconds  The left DP pulse is 1+. The left PT pulse is 1+.     Assign Risk Category  Deformity present  Loss of protective sensation  Weak pulses  Risk: 3          Labs:   Lab Results   Component Value Date    HGBA1C 5.5 04/08/2024    HGBA1C 5.7 (H) 01/06/2024    HGBA1C 7.3 (H) 09/03/2023     Lab Results   Component Value Date    CREATININE 1.61 (H) 02/15/2024    CREATININE 1.39 (H) 01/06/2024    CREATININE 1.75 (H) 11/02/2023    BUN 26 (H) 02/15/2024    K 4.1 02/15/2024     02/15/2024    CO2 24 02/15/2024      eGFR   Date Value Ref Range Status   02/15/2024 30 ml/min/1.73sq m Final     Lab Results   Component Value Date    HDL 49 (L) 01/06/2024    TRIG 161 (H) 01/06/2024     Lab Results   Component Value Date    ALT 9 02/15/2024    AST 10 (L) 02/15/2024    ALKPHOS 96 02/15/2024     Lab Results   Component Value Date    [id number] 1.856 01/06/2024    AOX5REDGJRFO 3.160 06/14/2023    [id number] 2.690 02/15/2023     Lab Results   Component Value Date    FREET4 1.07 04/12/2021       Discussed with the patient and all questioned fully answered. She will call me if any problems arise.    Follow-up appointment in 4 months.     Counseled patient on diagnostic results, prognosis, risk and benefit of treatment options, instruction for management, importance of treatment compliance, Risk  factor reduction and impressions    GRACIELA Pineda

## 2024-04-18 NOTE — ASSESSMENT & PLAN NOTE
Physical exam demonstrates significant ulceration and infection of right great toe.  Concerns for spreading cellulitis as remainder of toes positive for erythema and edema. Provided with ASAP referral to podiatry. In the meantime, recommend going to ED directly from appointment for evaluation as I suspect patient will need at least IV antibiotics as well as debridement.   Lab Results   Component Value Date    HGBA1C 5.5 04/08/2024

## 2024-04-19 LAB
ANION GAP SERPL CALCULATED.3IONS-SCNC: 10 MMOL/L (ref 4–13)
BUN SERPL-MCNC: 29 MG/DL (ref 5–25)
CALCIUM SERPL-MCNC: 8.9 MG/DL (ref 8.4–10.2)
CHLORIDE SERPL-SCNC: 110 MMOL/L (ref 96–108)
CO2 SERPL-SCNC: 20 MMOL/L (ref 21–32)
CREAT SERPL-MCNC: 1.27 MG/DL (ref 0.6–1.3)
ERYTHROCYTE [DISTWIDTH] IN BLOOD BY AUTOMATED COUNT: 13.4 % (ref 11.6–15.1)
GFR SERPL CREATININE-BSD FRML MDRD: 41 ML/MIN/1.73SQ M
GLUCOSE P FAST SERPL-MCNC: 121 MG/DL (ref 65–99)
GLUCOSE SERPL-MCNC: 104 MG/DL (ref 65–140)
GLUCOSE SERPL-MCNC: 108 MG/DL (ref 65–140)
GLUCOSE SERPL-MCNC: 113 MG/DL (ref 65–140)
GLUCOSE SERPL-MCNC: 121 MG/DL (ref 65–140)
GLUCOSE SERPL-MCNC: 179 MG/DL (ref 65–140)
HCT VFR BLD AUTO: 38.1 % (ref 34.8–46.1)
HGB BLD-MCNC: 12.7 G/DL (ref 11.5–15.4)
MCH RBC QN AUTO: 29.5 PG (ref 26.8–34.3)
MCHC RBC AUTO-ENTMCNC: 33.3 G/DL (ref 31.4–37.4)
MCV RBC AUTO: 88 FL (ref 82–98)
PLATELET # BLD AUTO: 258 THOUSANDS/UL (ref 149–390)
PMV BLD AUTO: 10 FL (ref 8.9–12.7)
POTASSIUM SERPL-SCNC: 3.6 MMOL/L (ref 3.5–5.3)
RBC # BLD AUTO: 4.31 MILLION/UL (ref 3.81–5.12)
SODIUM SERPL-SCNC: 140 MMOL/L (ref 135–147)
VANCOMYCIN SERPL-MCNC: 15 UG/ML (ref 10–20)
WBC # BLD AUTO: 8.83 THOUSAND/UL (ref 4.31–10.16)

## 2024-04-19 PROCEDURE — 80048 BASIC METABOLIC PNL TOTAL CA: CPT | Performed by: STUDENT IN AN ORGANIZED HEALTH CARE EDUCATION/TRAINING PROGRAM

## 2024-04-19 PROCEDURE — 80202 ASSAY OF VANCOMYCIN: CPT | Performed by: STUDENT IN AN ORGANIZED HEALTH CARE EDUCATION/TRAINING PROGRAM

## 2024-04-19 PROCEDURE — 93926 LOWER EXTREMITY STUDY: CPT | Performed by: SURGERY

## 2024-04-19 PROCEDURE — 97163 PT EVAL HIGH COMPLEX 45 MIN: CPT

## 2024-04-19 PROCEDURE — 93922 UPR/L XTREMITY ART 2 LEVELS: CPT | Performed by: SURGERY

## 2024-04-19 PROCEDURE — 99233 SBSQ HOSP IP/OBS HIGH 50: CPT | Performed by: STUDENT IN AN ORGANIZED HEALTH CARE EDUCATION/TRAINING PROGRAM

## 2024-04-19 PROCEDURE — 97167 OT EVAL HIGH COMPLEX 60 MIN: CPT

## 2024-04-19 PROCEDURE — 0JBQ0ZZ EXCISION OF RIGHT FOOT SUBCUTANEOUS TISSUE AND FASCIA, OPEN APPROACH: ICD-10-PCS | Performed by: STUDENT IN AN ORGANIZED HEALTH CARE EDUCATION/TRAINING PROGRAM

## 2024-04-19 PROCEDURE — 99223 1ST HOSP IP/OBS HIGH 75: CPT

## 2024-04-19 PROCEDURE — 82948 REAGENT STRIP/BLOOD GLUCOSE: CPT

## 2024-04-19 PROCEDURE — 85027 COMPLETE CBC AUTOMATED: CPT | Performed by: STUDENT IN AN ORGANIZED HEALTH CARE EDUCATION/TRAINING PROGRAM

## 2024-04-19 RX ORDER — VANCOMYCIN HYDROCHLORIDE 500 MG/100ML
500 INJECTION, SOLUTION INTRAVENOUS EVERY 24 HOURS
Status: DISCONTINUED | OUTPATIENT
Start: 2024-04-19 | End: 2024-04-19

## 2024-04-19 RX ADMIN — ALLOPURINOL 50 MG: 100 TABLET ORAL at 10:03

## 2024-04-19 RX ADMIN — METRONIDAZOLE 500 MG: 500 TABLET ORAL at 21:33

## 2024-04-19 RX ADMIN — HEPARIN SODIUM 5000 UNITS: 5000 INJECTION INTRAVENOUS; SUBCUTANEOUS at 05:24

## 2024-04-19 RX ADMIN — TICAGRELOR 90 MG: 90 TABLET ORAL at 10:04

## 2024-04-19 RX ADMIN — TICAGRELOR 90 MG: 90 TABLET ORAL at 21:33

## 2024-04-19 RX ADMIN — ASPIRIN 81 MG: 81 TABLET, CHEWABLE ORAL at 10:02

## 2024-04-19 RX ADMIN — HEPARIN SODIUM 5000 UNITS: 5000 INJECTION INTRAVENOUS; SUBCUTANEOUS at 21:33

## 2024-04-19 RX ADMIN — METRONIDAZOLE 500 MG: 500 TABLET ORAL at 14:52

## 2024-04-19 RX ADMIN — INSULIN LISPRO 1 UNITS: 100 INJECTION, SOLUTION INTRAVENOUS; SUBCUTANEOUS at 11:46

## 2024-04-19 RX ADMIN — PRAVASTATIN SODIUM 80 MG: 80 TABLET ORAL at 17:08

## 2024-04-19 RX ADMIN — CINACALCET 30 MG: 30 TABLET, FILM COATED ORAL at 10:02

## 2024-04-19 RX ADMIN — CEFAZOLIN SODIUM 2000 MG: 2 SOLUTION INTRAVENOUS at 21:33

## 2024-04-19 RX ADMIN — LABETALOL HYDROCHLORIDE 300 MG: 100 TABLET, FILM COATED ORAL at 21:33

## 2024-04-19 RX ADMIN — CEFAZOLIN SODIUM 2000 MG: 2 SOLUTION INTRAVENOUS at 10:04

## 2024-04-19 RX ADMIN — METRONIDAZOLE 500 MG: 500 TABLET ORAL at 05:25

## 2024-04-19 RX ADMIN — HEPARIN SODIUM 5000 UNITS: 5000 INJECTION INTRAVENOUS; SUBCUTANEOUS at 14:52

## 2024-04-19 RX ADMIN — TRAZODONE HYDROCHLORIDE 50 MG: 50 TABLET ORAL at 21:33

## 2024-04-19 RX ADMIN — AMLODIPINE BESYLATE 5 MG: 5 TABLET ORAL at 10:03

## 2024-04-19 RX ADMIN — ESCITALOPRAM OXALATE 20 MG: 10 TABLET ORAL at 10:03

## 2024-04-19 RX ADMIN — LOSARTAN POTASSIUM 100 MG: 50 TABLET, FILM COATED ORAL at 10:11

## 2024-04-19 RX ADMIN — LABETALOL HYDROCHLORIDE 300 MG: 100 TABLET, FILM COATED ORAL at 10:03

## 2024-04-19 NOTE — PHYSICAL THERAPY NOTE
Physical Therapy Evaluation     Patient's Name: Anamaria Parnell    Admitting Diagnosis  Cellulitis [L03.90]  Cellulitis of right foot [L03.115]  Visit for wound check [Z51.89]  Diabetic ulcer of toe of right foot associated with diabetes mellitus due to underlying condition, limited to breakdown of skin (HCC) [E08.621, L97.511]    Problem List  Patient Active Problem List   Diagnosis    Basilar artery stenosis    CVA (cerebral vascular accident) (HCC)    Chronic GERD    Acute renal failure superimposed on stage 3 chronic kidney disease (HCC)    Claudication in peripheral vascular disease (HCC)    Type 2 diabetes mellitus with diabetic nephropathy (HCC)    Gout    Hx-TIA (transient ischemic attack)    Hypercholesteremia    Hyperlipidemia associated with type 2 diabetes mellitus  (HCC)    Hypertension    Hypertension associated with diabetes  (HCC)    Osteoarthritis    Polyneuropathy    Right renal artery stenosis (HCC)    Vertebrobasilar artery syndrome    Vitamin D deficiency    Statin intolerance    Lumbar disc herniation    Spondylosis of lumbar region without myelopathy or radiculopathy    Aortoiliac stenosis, left (HCC)    Lumbosacral spondylosis without myelopathy    Proteinuria    Hyperlipidemia, unspecified    Herniated lumbar intervertebral disc    Celiac artery stenosis (HCC) >70% stenosis in the celiac trunk    Abnormal CT of the chest suspicious for an infectious or inflammatory bronchiolitis.    Positive cardiac stress test  small, mildly severe, partially reversible myocardial perfusion defect of anterior and inferior wall     Femoral artery stenosis, right (HCC) 50-75% stenosis in the common femoral artery.    Mesenteric artery stenosis (HCC)    Median arcuate ligament syndrome (HCC)    Atherosclerosis of native arteries of extremities with rest pain, right leg (HCC)    Symptomatic carotid artery stenosis, left    Pulmonary nodule 7 mm groundglass opacity in the right upper lobe, unchanged since  October 2019    Stenosis of left anterior descending artery Mid LAD 50-60% stenosis    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    Malignant neoplasm of overlapping sites of bladder (HCC)    Cervical radiculopathy    Stage 4 chronic kidney disease (HCC)    Hypertensive kidney disease with stage 4 chronic kidney disease (HCC)    Depression, recurrent (HCC)    Type 2 diabetes mellitus, without long-term current use of insulin (HCC)    Type 2 diabetes mellitus with diabetic peripheral angiopathy without gangrene (HCC)    Gastrointestinal stromal tumor (GIST) (HCC)    History of gastrointestinal stromal tumor (GIST)    Secondary hyperparathyroidism of renal origin (HCC)    Aortoiliac occlusive disease (HCC)    Hypertensive urgency    Dysarthria    Stage 3b chronic kidney disease (HCC)    Acute CVA (cerebrovascular accident) (HCC)    Primary hypertension    Smoking    Renal cyst    Encounter to discuss test results    Complex renal cyst    Encounter for follow-up surveillance of urothelial carcinoma of lower urinary tract    Carotid stenosis, asymptomatic, right    History of left common carotid artery stent placement    Diabetic ulcer of toe of right foot associated with diabetes mellitus due to underlying condition, limited to breakdown of skin (HCC)    Cellulitis    Stage 3 chronic kidney disease (HCC)       Past Medical History  Past Medical History:   Diagnosis Date    Aphasia as late effect of cerebrovascular accident (CVA) 08/16/2023    Arthritis     Chronic kidney disease     Diabetes mellitus (HCC)     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/20/2021    Endometriosis     High cholesterol     History of left common carotid artery stent placement 10/27/2023    Hypertension     Kidney disease, chronic, stage III (moderate, EGFR 30-59 ml/min) (HCC)     Lumbar disc herniation     Neuropathy     Obesity (BMI 30-39.9) 12/04/2017    Obesity,  morbid (HCC) 01/20/2021    Peripheral vascular disease (HCC)     Pneumonia     Shoulder injury     left    Spinal stenosis     Stroke ([facility]) 2015    Memory loss       Past Surgical History  Past Surgical History:   Procedure Laterality Date    CARDIAC CATHETERIZATION      CAROTID STENT Left 9/7/2023    Procedure: L TCAR;  Surgeon: Nicole Gallo MD;  [facility];  Service: Vascular    COLONOSCOPY      FL RETROGRADE PYELOGRAM  4/6/2020    FRACTURE SURGERY Right     ankle    IR CAROTID STENT  9/7/2023    OVARIAN CYST SURGERY      ID CYSTO BLADDER W/URETERAL CATHETERIZATION Bilateral 4/6/2020    Procedure: CYSTOSCOPY WITH RETROGRADE PYELOGRAM;  Surgeon: Robert Mitchell MD;  [facility];  Service: Urology    ID CYSTO W/INSERT URETERAL STENT Right 4/6/2020    Procedure: INSERTION STENT URETERAL;  Surgeon: Robert Mitchell MD;  [facility];  Service: Urology    ID CYSTO W/REMOVAL OF TUMORS SMALL N/A 4/6/2020    Procedure: TRANSURETHRAL RESECTION OF BLADDER TUMOR (TURBT);  Surgeon: Robert Mitchell MD;  [facility];  Service: Urology    ROTATOR CUFF REPAIR Left     TONSILLECTOMY            04/19/24 0838   PT Last Visit   PT Visit Date 04/19/24   Note Type   Note type Evaluation   Pain Assessment   Pain Assessment Tool 0-10   Pain Score No Pain   Restrictions/Precautions   Weight Bearing Precautions Per Order   (maintained NWB R LE this session pending podiatry consult)   Braces or Orthoses   (none reported)   Other Precautions Bed Alarm;Chair Alarm;WBS;Fall Risk   Home Living   Type of Home House   Home Layout One level;Ramped entrance;Able to live on main level with bedroom/bathroom  (4-5 SEKOU)   Bathroom Shower/Tub Walk-in shower   Bathroom Toilet Standard   Bathroom Equipment Grab bars in shower;Shower chair;Grab bars around toilet;Commode   Bathroom Accessibility Accessible;Accessible via wheelchair   Home Equipment Walker;Cane;Wheelchair-electric   Additional Comments non  ambulatory for the past 2 months   Prior Function   Level of [place] Independent with ADLs;Independent with functional mobility   Lives With Son;Family   Receives Help From Family   IADLs Family/Friend/Other provides transportation;Independent with meal prep;Family/Friend/Other provides medication management   Falls in the last 6 months 1 to 4  (3 falls)   Vocational Retired   Comments sleeps in a Recliner and stands pivot into the wheelchair (electric)   General   Family/Caregiver Present No   Cognition   Overall Cognitive Status WFL   Arousal/Participation Alert   Orientation Level Oriented X4   Memory Within functional limits   Following Commands Follows all commands and directions without difficulty   Comments pt agreeable to PT evaluation   RLE Assessment   RLE Assessment   (grossly 3+/5)   LLE Assessment   LLE Assessment   (grossly 3+/5)   Coordination   Movements are Fluid and Coordinated 1   Sensation WFL   Bed Mobility   Supine to Sit 4  Minimal assistance   Additional items Assist x 1;HOB elevated;Bedrails;Increased time required;Verbal cues   Transfers   Sit to Stand 4  Minimal assistance   Additional items Assist x 1;Armrests;Increased time required;Verbal cues   Stand to Sit 4  Minimal assistance   Additional items Assist x 1;Armrests;Increased time required;Verbal cues   Additional Comments vc for offloading RLE   Balance   Static Sitting Fair +   Dynamic Sitting Fair   Static Standing Poor +   Dynamic Standing Poor +   Endurance Deficit   Endurance Deficit Yes   Activity Tolerance   Activity Tolerance Patient limited by fatigue   Medical Staff Made Aware Pt seen as a co-eval with OT due to the patient's co-morbidities, clinically unstable presentation, and present impairments which are a regression from the patient's baseline.   Nurse Made Aware ZULEYMA Bullard   Assessment   Prognosis Good   Problem List Decreased strength;Decreased endurance;Impaired balance;Decreased mobility;Impaired  sensation;Pain;Decreased skin integrity   Assessment Pt is 78 y.o. female seen for PT evaluation on 4/19/2024 s/p admit to [facility] on 4/18/2024 w/ Cellulitis. PT was consulted to assess pt's functional mobility and d/c needs. Order placed for PT eval and tx. PTA, pt resides with family in [place] with ramped entrance, nonambulatory for 2 months, performs stand pivot transfer to electric w/c. At time of eval, pt requiring min A for bed mobility and transfers. Upon evaluation, pt presenting with impaired functional mobility d/t decreased strength, decreased endurance, impaired balance, decreased mobility, impaired sensation, decreased skin integrity, and activity intolerance. Pertinent PMHx and current co-morbidities affecting pt's physical performance at time of assessment include: cellulitis, femoral artery stenosis, type 2 DM, HTN, diabetic ulcer, stage 3 CKD, CVA, chronic GERD, acute renal failure, HTN, polyneuropathy, R renal artery stenosis, lumbar disc herniation, lumbosacral spondylosis without myelopathy, mesenteric artery stenosis, OA, depression, carotid stenosis. Personal factors affecting pt at time of eval include: inability to ambulate household distances, inability to navigate level surfaces w/o external assistance, and positive fall history. The following objective measures performed on IE also reveal limitations: Barthel Index: 45/100, Modified [place]: 4 (moderate/severe disability), and AM-PAC 6-Clicks: 12/24. Pt's clinical presentation is currently unstable/unpredictable seen in pt's presentation of abnormal lab value(s) and ongoing medical assessment. Overall, pt's rehab potential and prognosis to return to PLOF is good as impacted by objective findings, warranting pt to receive further skilled PT interventions to address identified impairments, activity limitation(s), and participation restriction(s). Pt to benefit from continued PT tx to address deficits as defined above and maximize level  of functional independent mobility. From PT/mobility standpoint, recommend level 2, moderate resource intensity in order to facilitate return to PLOF.   Barriers to Discharge Inaccessible home environment   Goals   Patient Goals to go home for husbands memorial service   [facility] Expiration Date 04/29/24   Short Term Goal #1 In 7-10 days: Increase bilateral LE strength 1/2 grade to facilitate independent mobility, Perform all bed mobility tasks modified independent to decrease caregiver burden, Perform all transfers modified independent to improve independence, Increase all balance 1/2 grade to decrease risk for falls, Improve Barthel Index score to 60 or greater to facilitate independence, and PT to see and establish goals for ambulatory balance, ambulation, w/c mobility when appropriate   PT Treatment Day 0   Plan   Treatment/Interventions Functional transfer training;LE strengthening/ROM;Endurance training;Therapeutic exercise;Patient/family training;Equipment eval/education;Bed mobility;Spoke to nursing   PT Frequency 3-5x/wk   Discharge Recommendation   Rehab Resource Intensity Level, PT II (Moderate Resource Intensity)   AM-PAC Basic Mobility Inpatient   Turning in Flat Bed Without Bedrails 3   Lying on Back to Sitting on Edge of Flat Bed Without Bedrails 3   Moving Bed to Chair 2   Standing Up From Chair Using Arms 2   Walk in Room 1   Climb 3-5 Stairs With Railing 1   Basic Mobility Inpatient Raw Score 12   Basic Mobility Standardized Score 32.23   [facility] Highest Level Of Mobility   -[facility] Goal 4: Move to chair/commode   -[facility] Achieved 4: Move to chair/commode   Modified [place] Scale   Modified [place] Scale 4   Barthel Index   Feeding 10   Bathing 0   Grooming Score 0   Dressing Score 5   Bladder Score 10   Bowels Score 10   Toilet Use Score 5   Transfers (Bed/Chair) Score 5   Mobility (Level Surface) Score 0   Stairs Score 0   Barthel Index Score 45         Dee Dee Varela, PT, DPT

## 2024-04-19 NOTE — CASE MANAGEMENT
Case Management Assessment & Discharge Planning Note    Patient name Anamaria Parnell  Location /-01 MRN [id number]  : 1945 Date 2024       Current Admission Date: 2024  Current Admission Diagnosis:Cellulitis   Patient Active Problem List    Diagnosis Date Noted    Diabetic ulcer of toe of right foot associated with diabetes mellitus due to underlying condition, limited to breakdown of skin (HCC) 2024    Cellulitis 2024    Stage 3 chronic kidney disease (HCC) 2024    Carotid stenosis, asymptomatic, right 10/27/2023    History of left common carotid artery stent placement 10/27/2023    Complex renal cyst 10/18/2023    Encounter for follow-up surveillance of urothelial carcinoma of lower urinary tract 10/18/2023    Encounter to discuss test results 10/17/2023    Smoking 2023    Renal cyst 2023    Primary hypertension 2023    Acute CVA (cerebrovascular accident) (HCC) 2023    Dysarthria 08/15/2023    Stage 3b chronic kidney disease (HCC)     Hypertensive urgency 2023    Aortoiliac occlusive disease (HCC) 10/11/2022    History of gastrointestinal stromal tumor (GIST) 2022    Secondary hyperparathyroidism of renal origin (HCC) 2022    Gastrointestinal stromal tumor (GIST) (HCC) 2022    Type 2 diabetes mellitus, without long-term current use of insulin (HCC) 2021    Type 2 diabetes mellitus with diabetic peripheral angiopathy without gangrene (HCC) 2021    Depression, recurrent (HCC) 2021    Stage 4 chronic kidney disease (HCC) 2020    Hypertensive kidney disease with stage 4 chronic kidney disease (HCC) 2020    Cervical radiculopathy 2020    Malignant neoplasm of overlapping sites of bladder (HCC) 2020    Stenosis of left anterior descending artery Mid LAD 50-60% stenosis 2020    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  04/01/2020    Pulmonary nodule 7 mm groundglass opacity in the right upper lobe, unchanged since October 2019 03/19/2020    Atherosclerosis of native arteries of extremities with rest pain, right leg ([facility]) 03/03/2020    Symptomatic carotid artery stenosis, left 03/03/2020    Femoral artery stenosis, right (HCC) 50-75% stenosis in the common femoral artery. 01/14/2020    Mesenteric artery stenosis (HCC) 01/14/2020    Positive cardiac stress test  small, mildly severe, partially reversible myocardial perfusion defect of anterior and inferior wall  11/12/2019    Abnormal CT of the chest suspicious for an infectious or inflammatory bronchiolitis. 11/07/2019    Celiac artery stenosis ([facility]) >70% stenosis in the celiac trunk 11/05/2019    Median arcuate ligament syndrome ([facility]) 11/05/2019    Proteinuria 07/01/2019    Aortoiliac stenosis, left ([facility]) 02/25/2019    Spondylosis of lumbar region without myelopathy or radiculopathy 01/29/2019    Lumbosacral spondylosis without myelopathy 01/29/2019    Statin intolerance 01/22/2019    Lumbar disc herniation 01/22/2019    Herniated lumbar intervertebral disc 01/22/2019    Chronic GERD 12/04/2017    Acute renal failure superimposed on stage 3 chronic kidney disease ([facility]) 12/04/2017    Claudication in peripheral vascular disease ([facility]) 12/04/2017    Gout 12/04/2017    Hx-TIA (transient ischemic attack) 12/04/2017    Hyperlipidemia associated with type 2 diabetes mellitus  ([facility]) 12/04/2017    Hypertension associated with diabetes  ([facility]) 12/04/2017    Osteoarthritis 12/04/2017    Right renal artery stenosis ([facility]) 12/04/2017    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    Hyperlipidemia, unspecified 12/04/2017    Vitamin D deficiency 09/08/2016    Basilar artery stenosis 06/22/2016    Type 2 diabetes mellitus with diabetic nephropathy ([facility]) 12/19/2015    Hypercholesteremia 12/19/2015    Hypertension 12/19/2015    Polyneuropathy 12/19/2015    CVA (cerebral vascular accident) ([facility])  11/09/2015      LOS (days): 0  Geometric Mean LOS (GMLOS) (days): 3.2  Days to GMLOS:3     OBJECTIVE:    Risk of Unplanned Readmission Score: 18.58         Current admission status: Inpatient  Referral Reason: Other (D/C planning)    Preferred Pharmacy:   [id number] - [place] - [address]  [address]  [facility] [address]  Phone: [phone] Fax: [phone]    Primary Care Provider: Ritchie Lawton MD    Primary Insurance: [employer]  Secondary Insurance:     ASSESSMENT:  Active Health Care Proxies       austin parnell [facility] Care Representative - Son   Primary Phone: [phone] (Mobile)                 Advance Directives  Does patient have a Health Care POA?: Yes  Does patient have Advance Directives?: Yes  Primary Contact: Austinnolvia Parnell         Readmission Root Cause  30 Day Readmission: No    Patient Information  Admitted from:: Home  Mental Status: Alert  During Assessment patient was accompanied by: Not accompanied during assessment  Assessment information provided by:: Patient  Primary Caregiver: Self  Support Systems: Son, Family members  County of Residence: [place]  What [facility] do you live in?: [place]  Home entry access options. Select all that apply.: [facility]  Type of Current Residence: [facility]  Living Arrangements: Lives w/ Son, Lives w/ Extended Family  Is patient a ?: No    Activities of Daily Living Prior to Admission  Functional Status: Assistance  Completes ADLs independently?: Yes  Ambulates independently?: No  Level of ambulatory dependence: Assistance  Does patient use assisted devices?: Yes  Assisted Devices (DME) used: Wheelchair, Bedside Commode, Shower Chair  Does patient currently own DME?: Yes  What DME does the patient currently own?: Shower Chair, Bedside Commode, Wheelchair  Does patient have a history of Outpatient Therapy (PT/OT)?: Yes  Does the patient have a history of Short-Term Rehab?: Yes  Does patient have a history of HHC?:  Yes  Does patient currently have C?: No         Patient Information Continued  Income Source: Pension/prison  Does patient have prescription coverage?: Yes  Does patient receive dialysis treatments?: No  Does patient have a history of substance abuse?: No  Does patient have a history of Mental Health Diagnosis?: No         Means of Transportation  Means of Transport to Appts:: Family transport      Social Determinants of Health (SDOH)      Flowsheet Row Most Recent Value   Housing Stability    In the last 12 months, was there a time when you were not able to pay the mortgage or rent on time? N   In the last 12 months, how many places have you lived? 1   In the last 12 months, was there a time when you did not have a steady place to sleep or slept in a shelter (including now)? N   Transportation Needs    In the past 12 months, has lack of transportation kept you from medical appointments or from getting medications? no   In the past 12 months, has lack of transportation kept you from meetings, work, or from getting things needed for daily living? No   Food Insecurity    Within the past 12 months, you worried that your food would run out before you got the money to buy more. Never true   Within the past 12 months, the food you bought just didn't last and you didn't have money to get more. Never true   Utilities    In the past 12 months has the electric, gas, oil, or water company threatened to shut off services in your home? No            DISCHARGE DETAILS:    Discharge planning discussed with:: Pt  Freedom of Choice: Yes        Were Treatment Team discharge recommendations reviewed with patient/caregiver?: Yes  Did patient/caregiver verbalize understanding of patient care needs?: Yes       Contacts  Patient Contacts: Austin Parnell  Relationship to Patient:: Family  Contact Method: Phone  Phone Number: [phone]  Reason/Outcome: Continuity of Care, Discharge Planning    Requested Home Health Care         Is  the patient interested in HHC at discharge?: No    DME Referral Provided  Referral made for DME?: No    Other Referral/Resources/Interventions Provided:  Interventions: None Indicated         Treatment Team Recommendation: Home  Discharge Destination Plan:: Home  Transport at Discharge : Family                             IMM Given (Date):: 04/19/24  IMM Given to:: Patient     Additional Comments: CM met with pt at bedside. Pt reported that she completes ADL's independently. Pt reported that her son lives with her and provides assistance as needed. Son will transport at D/C. Pt reported no CM needs at this time. CM reviewed IMM and obtained pt signature. CM will continue to follow.

## 2024-04-19 NOTE — ASSESSMENT & PLAN NOTE
Cellulitis of right foot with associated known diabetic ulcer, ascending up to ankle.  Purulent discharge reported  Continue with IV Ancef, Flagyl and vancomycin  Not meeting sepsis criteria  Podiatry consulted, appreciate recommendation  Monitor fever curve and WBC count

## 2024-04-19 NOTE — ASSESSMENT & PLAN NOTE
Lab Results   Component Value Date    HGBA1C 5.5 04/08/2024       Recent Labs     04/19/24  0721 04/19/24  1049 04/19/24  1603   POCGLU 108 179* 104       Blood Sugar Average: Last 72 hrs:  (P) [id number] history of diabetic ulcer of toe, seen outpatient and advised to come to the ED due to worsening erythema edema and associated cellulitis.  Reported to have discharge at site  Not meeting sepsis criteria  Continue with IV cefazolin for now.  Podiatry consulted, appreciate recommendations

## 2024-04-19 NOTE — PROGRESS NOTES
Anamaria Parnell is a 78 y.o. female who is currently ordered Vancomycin IV with management by the Pharmacy Consult service.  Relevant clinical data and objective / subjective history reviewed.  Vancomycin Assessment:  Indication and Goal AUC/Trough: Soft tissue (goal -600, trough >10), -600, trough >10  Clinical Status: stable  Micro:   pending  Renal Function:  SCr: 1.44 mg/dL  CrCl: 27.3 mL/min  Renal replacement: Not on dialysis  Days of Therapy: 1  Current Dose: 1500mg loading dose  Vancomycin Plan:  New Dosinmg Q24H  Estimated AUC: 404 mcg*hr/mL  Estimated Trough: 12.9 mcg/mL  Next Level: 24  Renal Function Monitoring: Daily BMP and UOP  Pharmacy will continue to follow closely for s/sx of nephrotoxicity, infusion reactions and appropriateness of therapy.  BMP and CBC will be ordered per protocol. We will continue to follow the patient’s culture results and clinical progress daily.    Kemar Martinez, Pharmacist

## 2024-04-19 NOTE — ASSESSMENT & PLAN NOTE
Cellulitis of right foot with associated known diabetic ulcer, ascending up to ankle.   Patient was given IV Ancef, Flagyl, vancomycin.  Not meeting sepsis criteria.  Blood cultures pending.    Current on encounter patient is afebrile, hemodynamically stable.  Right foot noted without significant erythema and appears to be improving compared to yesterday per patient.  Patient does have known history of aortoiliac artery disease.  She is currently not interested in any aggressive surgical intervention.  Discontinue vancomycin and Flagyl.  Continue with IV cefazolin for now.  Follow-up with vascular surgery recommendation.  Continue supportive care.

## 2024-04-19 NOTE — ASSESSMENT & PLAN NOTE
"Lab Results   Component Value Date    HGBA1C 5.5 04/08/2024       No results for input(s): \"POCGLU\" in the last 72 hours.    Blood Sugar Average: Last 72 hrs:  Known history of diabetic ulcer of toe, seen outpatient and advised to come to the ED due to worsening erythema edema and associated cellulitis.  Reported to have discharge at site  Not meeting sepsis criteria  Continue with IV vancomycin Ancef and Flagyl  Podiatry consulted, appreciate recommendations      "

## 2024-04-19 NOTE — PLAN OF CARE
Problem: PHYSICAL THERAPY ADULT  Goal: Performs mobility at highest level of function for planned discharge setting.  See evaluation for individualized goals.  Description: Treatment/Interventions: Functional transfer training, LE strengthening/ROM, Endurance training, Therapeutic exercise, Patient/family training, Equipment eval/education, Bed mobility, Spoke to nursing          See flowsheet documentation for full assessment, interventions and recommendations.  4/19/2024 1157 by Dee Dee Varela PT  Note: Prognosis: Good  Problem List: Decreased strength, Decreased endurance, Impaired balance, Decreased mobility, Impaired sensation, Pain, Decreased skin integrity  Assessment: Pt is 78 y.o. female seen for PT evaluation on 4/19/2024 s/p admit to [facility] on 4/18/2024 w/ Cellulitis. PT was consulted to assess pt's functional mobility and d/c needs. Order placed for PT eval and tx. PTA, pt resides with family in 1SH with ramped entrance, nonambulatory for 2 months, performs stand pivot transfer to electric w/c. At time of eval, pt requiring min A for bed mobility and transfers. Upon evaluation, pt presenting with impaired functional mobility d/t decreased strength, decreased endurance, impaired balance, decreased mobility, impaired sensation, decreased skin integrity, and activity intolerance. Pertinent PMHx and current co-morbidities affecting pt's physical performance at time of assessment include: cellulitis, femoral artery stenosis, type 2 DM, HTN, diabetic ulcer, stage 3 CKD, CVA, chronic GERD, acute renal failure, HTN, polyneuropathy, R renal artery stenosis, lumbar disc herniation, lumbosacral spondylosis without myelopathy, mesenteric artery stenosis, OA, depression, carotid stenosis. Personal factors affecting pt at time of eval include: inability to ambulate household distances, inability to navigate level surfaces w/o external assistance, and positive fall history. The following objective measures  performed on IE also reveal limitations: Barthel Index: 45/100, Modified Nuevo: 4 (moderate/severe disability), and AM-PAC 6-Clicks: 12/24. Pt's clinical presentation is currently unstable/unpredictable seen in pt's presentation of abnormal lab value(s) and ongoing medical assessment. Overall, pt's rehab potential and prognosis to return to PLOF is good as impacted by objective findings, warranting pt to receive further skilled PT interventions to address identified impairments, activity limitation(s), and participation restriction(s). Pt to benefit from continued PT tx to address deficits as defined above and maximize level of functional independent mobility. From PT/mobility standpoint, recommend level 2, moderate resource intensity in order to facilitate return to PLOF.  Barriers to Discharge: Inaccessible home environment     Rehab Resource Intensity Level, PT: II (Moderate Resource Intensity)    See flowsheet documentation for full assessment.     4/19/2024 1157 by Dee Dee Varela PT  Note: Prognosis: Good  Problem List: Decreased strength, Decreased endurance, Impaired balance, Decreased mobility, Impaired sensation, Pain, Decreased skin integrity  Assessment: Pt is 78 y.o. female seen for PT evaluation on 4/19/2024 s/p admit to [facility] on 4/18/2024 w/ Cellulitis. PT was consulted to assess pt's functional mobility and d/c needs. Order placed for PT eval and tx. PTA, pt resides with family in 1SH with ramped entrance, nonambulatory for 2 months, performs stand pivot transfer to electric w/c. At time of eval, pt requiring min A for bed mobility and transfers. Upon evaluation, pt presenting with impaired functional mobility d/t decreased strength, decreased endurance, impaired balance, decreased mobility, impaired sensation, decreased skin integrity, and activity intolerance. Pertinent PMHx and current co-morbidities affecting pt's physical performance at time of assessment include: cellulitis, femoral  artery stenosis, type 2 DM, HTN, diabetic ulcer, stage 3 CKD, CVA, chronic GERD, acute renal failure, HTN, polyneuropathy, R renal artery stenosis, lumbar disc herniation, lumbosacral spondylosis without myelopathy, mesenteric artery stenosis, OA, depression, carotid stenosis. Personal factors affecting pt at time of eval include: inability to ambulate household distances, inability to navigate level surfaces w/o external assistance, and positive fall history. The following objective measures performed on IE also reveal limitations: Barthel Index: 45/100, Modified Maggy: 4 (moderate/severe disability), and AM-PAC 6-Clicks: 12/24. Pt's clinical presentation is currently unstable/unpredictable seen in pt's presentation of abnormal lab value(s) and ongoing medical assessment. Overall, pt's rehab potential and prognosis to return to PLOF is good as impacted by objective findings, warranting pt to receive further skilled PT interventions to address identified impairments, activity limitation(s), and participation restriction(s). Pt to benefit from continued PT tx to address deficits as defined above and maximize level of functional independent mobility. From PT/mobility standpoint, recommend level 2, moderate resource intensity in order to facilitate return to PLOF.  Barriers to Discharge: Inaccessible home environment     Rehab Resource Intensity Level, PT: II (Moderate Resource Intensity)    See flowsheet documentation for full assessment.

## 2024-04-19 NOTE — UTILIZATION REVIEW
"Initial Clinical Review      OBS order 4/18 2147 converted to IP on 4/19 0952 for continued mngt of cellulitis     Admission: Date/Time/Statement:   Admission Orders (From admission, onward)       Ordered        04/19/24 0952  INPATIENT ADMISSION  Once            04/18/24 2147  Place in Observation  Once                           INPATIENT ADMISSION     Standing Status:   Standing     Number of Occurrences:   1     Order Specific Question:   Level of Care     Answer:   Med Surg [16]     Order Specific Question:   Estimated length of stay     Answer:   More than 2 Midnights     Order Specific Question:   Certification     Answer:   I certify that inpatient services are medically necessary for this patient for a duration of greater than two midnights. See H&P and MD Progress Notes for additional information about the patient's course of treatment.     ED Arrival Information       Expected   -    Arrival   4/18/2024 17:35    Acuity   Urgent              Means of arrival   Wheelchair    Escorted by   Family Member    Service   Hospitalist    Admission type   Emergency              Arrival complaint   Wound Check             Chief Complaint   Patient presents with    Wound Check     Hx of peripheral artery disease and DMII, reports wound to head of right big toe worsening over the past two weeks with circular black area around size of nickel with green pus in triage. 1+ pedal pulse to foot, reports BG has been \"really good\" last Ha1c was 5.5 and is being taken off regular insulin per patient soon.        Initial Presentation: 78 y.o. female to ED from home w/ PMH of peripheral artery disease, type 2 diabetes, chronic nonhealing wounds of bilateral feet, hypertension who presents with worsening right foot erythema and swelling. Some discharge at site . Admitted OBS status w/ cellulitis plan for iv ancef , flagyl and vanco , podiatry consulted . CKD CR 1.44 at baseline , monitor . HTN resume home regimen . DM SSI and " monitor . Femoral artery stenosis cont plavix and statin .     PE: : R foot wound noted, chronic ulceration. Erythema, edema ascending up foot.      4/19 Podiatry Consult   right hallux wound -Pending LISA/PVR assessment.  If abnormalities, then I would recommend vascular consultation as well.-Continue IV antibiotics. Low suspicion for OM .     4/19 Vascular consult   LEAD 4/18 (preliminary): RLE- severe diffuse disease in fem-pop arteries with high grade vs occlusion in proximal-mid SFA with reconstitution distally. R LISA 0.10/-/-. L LISA 0.35/50/45 . CLTI with cellulitis of the right foot; improving on antibiotics. Nonpalpable pedal and femoral pulses in RLE. +faint DP signal. XR right foot negative of OM. LEAD 4/18 demonstrates proximal-mid SFA occlusion with reconstitution distally. LISA decreased from 0.29 to 0.10. MTP and GTP not audible. Cont asa, brilinta, statin . Abx .       ED Triage Vitals   Temperature Pulse Respirations Blood Pressure SpO2   04/18/24 1747 04/18/24 1747 04/18/24 1747 04/18/24 1747 04/18/24 1747   98.5 °F (36.9 °C) 67 18 (!) 187/79 97 %      Temp Source Heart Rate Source Patient Position - Orthostatic VS BP Location FiO2 (%)   04/18/24 1747 04/18/24 1747 04/18/24 1747 04/18/24 1747 --   Temporal Monitor Sitting Left arm       Pain Score       04/19/24 0012       No Pain          Wt Readings from Last 1 Encounters:   04/19/24 57.9 kg (127 lb 11.2 oz)     Additional Vital Signs:   04/19/24 0012 98.7 °F (37.1 °C) 76 16 158/77 104 99 % None (Room air) Lying   04/18/24 2255 -- 78 -- 182/71 Abnormal  -- 98 % -- --   04/18/24 2253 -- 75 18 182/71 Abnormal  102 98 % None (Room air) Lying   04/18/24 2104 -- 68 18 203/81 Abnormal  -- 98 % None (Room air) Lying     Pertinent Labs/Diagnostic Test Results:   XR foot 3+ views RIGHT   Final Result by Harpal Manzanares MD (04/19 0822)      No radiographic evidence of osteomyelitis.      Workstation performed: [id number]         VAS ARTERIAL  DUPLEX-LOWER LIMB UNILATERAL    (Results Pending)         Results from last 7 days   Lab Units 04/19/24  0534 04/18/24  2310 04/18/24 1918   WBC Thousand/uL 8.83  --  9.18   HEMOGLOBIN g/dL 12.7  --  13.9   HEMATOCRIT % 38.1  --  41.6   PLATELETS Thousands/uL 258 255 303   TOTAL NEUT ABS Thousands/µL  --   --  6.58         Results from last 7 days   Lab Units 04/19/24  0534 04/18/24 1918   SODIUM mmol/L 140 142   POTASSIUM mmol/L 3.6 3.8   CHLORIDE mmol/L 110* 105   CO2 mmol/L 20* 27   ANION GAP mmol/L 10 10   BUN mg/dL 29* 33*   CREATININE mg/dL 1.27 1.44*   EGFR ml/min/1.73sq m 41 35   CALCIUM mg/dL 8.9 9.5     Results from last 7 days   Lab Units 04/18/24 1918   AST U/L 10*   ALT U/L 8   ALK PHOS U/L 105*   TOTAL PROTEIN g/dL 7.4   ALBUMIN g/dL 4.1   TOTAL BILIRUBIN mg/dL 0.35     Results from last 7 days   Lab Units 04/19/24  0721   POC GLUCOSE mg/dl 108     Results from last 7 days   Lab Units 04/19/24  0534 04/18/24 1918   GLUCOSE RANDOM mg/dL 121 148*       Results from last 7 days   Lab Units 04/18/24 1918   PROTIME seconds 12.9   INR  0.92   PTT seconds 34         Results from last 7 days   Lab Units 04/18/24 1918   PROCALCITONIN ng/ml 0.07     Results from last 7 days   Lab Units 04/18/24 1918   LACTIC ACID mmol/L 1.4     Results from last 7 days   Lab Units 04/18/24 1918   BLOOD CULTURE  Received in Microbiology Lab. Culture in Progress.  Received in Microbiology Lab. Culture in Progress.         Results from last 7 days   Lab Units 04/19/24  0534   VANCOMYCIN RM ug/mL 15.0       ED Treatment:   Medication Administration from 04/18/2024 1735 to 04/18/2024 2323         Date/Time Order Dose Route Action     04/18/2024 2032 EDT vancomycin (VANCOCIN) 1500 mg in sodium chloride 0.9% 250 mL IVPB 1,500 mg Intravenous New Bag     04/18/2024 1924 EDT cefepime (MAXIPIME) 2 g/50 mL dextrose IVPB 2,000 mg Intravenous New Bag     04/18/2024 1923 EDT sodium chloride 0.9 % bolus 1,000 mL 1,000 mL Intravenous  New Bag     04/18/2024 2212 EDT ceFAZolin (ANCEF) IVPB (premix in dextrose) 2,000 mg 50 mL 2,000 mg Intravenous New Bag     04/18/2024 2209 EDT metroNIDAZOLE (FLAGYL) tablet 500 mg 500 mg Oral Given     04/18/2024 2256 EDT amLODIPine (NORVASC) tablet 5 mg 5 mg Oral Given     04/18/2024 2255 EDT labetalol (NORMODYNE) tablet 300 mg 300 mg Oral Given     04/18/2024 2256 EDT ticagrelor (BRILINTA) tablet 90 mg 90 mg Oral Given     04/18/2024 2256 EDT traZODone (DESYREL) tablet 50 mg 50 mg Oral Given          Past Medical History:   Diagnosis Date    Aphasia as late effect of cerebrovascular accident (CVA) 08/16/2023    Arthritis     Chronic kidney disease     Diabetes mellitus ([facility])     Embolism and 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 ([facility]) 01/20/2021    Endometriosis     High cholesterol     History of left common carotid artery stent placement 10/27/2023    Hypertension     Kidney disease, chronic, stage III (moderate, EGFR 30-59 ml/min) ([facility])     Lumbar disc herniation     Neuropathy     Obesity (BMI 30-39.9) 12/04/2017    Obesity, morbid ([facility]) 01/20/2021    Peripheral vascular disease ([facility])     Pneumonia     Shoulder injury     left    Spinal stenosis     Stroke ([facility]) 2015    Memory loss     Present on Admission:   Hypertensive urgency   Diabetic ulcer of toe of right foot associated with diabetes mellitus due to underlying condition, limited to breakdown of skin ([facility])   Cellulitis   Type 2 diabetes mellitus, without long-term current use of insulin ([facility])   Femoral artery stenosis, right ([facility]) 50-75% stenosis in the common femoral artery.      Admitting Diagnosis: Cellulitis [L03.90]  Cellulitis of right foot [L03.115]  Visit for wound check [Z51.89]  Diabetic ulcer of toe of right foot associated with diabetes mellitus due to underlying condition, limited to breakdown of skin ([facility]) [E08.621, L97.511]  Age/Sex: 78 y.o. female  Admission Orders:  Scheduled Medications:  allopurinol, 50 mg, Oral, Every  Other Day  amLODIPine, 5 mg, Oral, Daily  aspirin, 81 mg, Oral, Daily  cefazolin, 2,000 mg, Intravenous, Q12H  cinacalcet, 30 mg, Oral, Every Other Day  cloNIDine, 1 patch, Transdermal, Weekly  escitalopram, 20 mg, Oral, Daily  heparin (porcine), 5,000 Units, Subcutaneous, Q8H JAVIER  insulin lispro, 1-5 Units, Subcutaneous, TID AC  labetalol, 300 mg, Oral, Q12H JAVIER  losartan, 100 mg, Oral, Daily  metroNIDAZOLE, 500 mg, Oral, Q8H JAVIER  pravastatin, 80 mg, Oral, Daily With Dinner  ticagrelor, 90 mg, Oral, Q12H JAVIER  traZODone, 50 mg, Oral, HS  vancomycin, 500 mg, Intravenous, Q24H      Continuous IV Infusions:     PRN Meds:  acetaminophen, 650 mg, Oral, Q6H PRN    Fingerstick ac and hs   PT OT eval   Up and OOB       IP CONSULT TO PHARMACY  IP CONSULT TO PODIATRY  IP CONSULT TO VASCULAR SURGERY    Network Utilization Review Department  ATTENTION: Please call with any questions or concerns to [phone] and carefully listen to the prompts so that you are directed to the right person. All voicemails are confidential.   For Discharge needs, contact Care Management DC Support Team at [phone] opt. 2  Send all requests for admission clinical reviews, approved or denied determinations and any other requests to dedicated fax number below belonging to the campus where the patient is receiving treatment. List of dedicated fax numbers for the Facilities:  FACILITY NAME UR FAX NUMBER   ADMISSION DENIALS (Administrative/Medical Necessity) [phone]   DISCHARGE SUPPORT TEAM (NETWORK) [phone]   PARENT CHILD HEALTH (Maternity/NICU/Pediatrics) [phone]   [facility] [phone]   [facility] [phone]   [facility] [phone]   [facility] [phone]   [facility] [phone]   [facility] [phone]   [facility] [phone]    [facility] [phone]   [facility] [phone]   [facility] [phone]   [facility] [phone]   [facility] [place] [phone]

## 2024-04-19 NOTE — ASSESSMENT & PLAN NOTE
Present on admission  Resume home antihypertensive regimen  As needed IV labetalol for SBP greater than 180

## 2024-04-19 NOTE — WOUND OSTOMY CARE
Progress Note - Wound   Anamaria Parnell 78 y.o. female MRN: [id number]  Unit/Bed#: -01 Encounter: [id number]      Assessment:   Patient admitted due to cellulitis. History of CVA, CKD, diabetes, PVD, neuropathy. Wound care consulted for right foot wound. Patient agreeable to assessment, alert and oriented x4, OOB to chair with EHOB waffle cushion in place, heels elevated, is assist of 1 with walker to stand for assessment, continent of bowel and bladder, is an assist with care. Podiatry consulted for right toe/foot wound.    1. Bilateral buttock- skin is dry, intact, blanchable hyperpigmented skin and blanchable purple discolored skin that is chronic for patient.    2. Right great toe- Chronic wound. Wound is oval in shape, true depth unknown, 100% covered in dry brown well adhered eschar, no drainage noted. Miley-wound is dry, intact, with mild erythema, and scaly skin. -Podiatry assessed and orders are in place for wound care.    3. Bilateral elbows, hips, and heels- skin is dry, intact, blanchable.    Educated patient on importance of frequent offloading of pressure via turning, repositioning, and weight-shifting. Verbalized understanding of plan of care.    Wound care will sign off at this time, please re-consult if needed. Please follow skin care recommendations written as orders for skin protection and prevention.    Skin care plans:  1-Hydraguard to bilateral sacrum, buttock and heels BID and PRN  2-Elevate heels to offload pressure.  3-Ehob cushion in chair when out of bed.  4-Moisturize skin daily with skin nourishing cream.  5-Turn/reposition q2h for pressure re-distribution on skin.   6- Right toe wound care per podiatry orders.    Wound 09/07/23 Pressure Injury Buttocks Left (Active)   Wound Image   04/19/24 1057   Wound Description Dry;Intact 04/19/24 1057   Miley-wound Assessment Dry;Intact;Hyperpigmented;Scaly 04/19/24 1057   Wound Length (cm) 0 cm 04/19/24 1057   Wound Width (cm) 0 cm 04/19/24  1057   Wound Depth (cm) 0 cm 04/19/24 1057   Wound Surface Area (cm^2) 0 cm^2 04/19/24 1057   Wound Volume (cm^3) 0 cm^3 04/19/24 1057   Calculated Wound Volume (cm^3) 0 cm^3 04/19/24 1057       Wound 04/19/24 Toe D1, great Anterior;Right (Active)   Wound Image   04/19/24 1055   Wound Description Dry;Brown;Eschar 04/19/24 1055   Miley-wound Assessment Dry;Intact;Callus 04/19/24 1055   Wound Length (cm) 0.8 cm 04/19/24 1055   Wound Width (cm) 1 cm 04/19/24 1055   Wound Depth (cm) 0 cm 04/19/24 1055   Wound Surface Area (cm^2) 0.8 cm^2 04/19/24 1055   Wound Volume (cm^3) 0 cm^3 04/19/24 1055   Calculated Wound Volume (cm^3) 0 cm^3 04/19/24 1055   Drainage Amount None 04/19/24 1055   Non-staged Wound Description Not applicable 04/19/24 1055   Treatments Site care 04/19/24 1055   Dressing Other (Comment) 04/19/24 1055       Wound Care will sign off  Call or Tigertext with any questions    Emma TIJERINAN RN CWON  Wound and Ostomy care

## 2024-04-19 NOTE — ASSESSMENT & PLAN NOTE
Lab Results   Component Value Date    HGBA1C 5.5 04/08/2024       Recent Labs     04/19/24  0721 04/19/24  1049 04/19/24  1603   POCGLU 108 179* 104       Blood Sugar Average: Last 72 hrs:  (P) [id number]  Holding oral hypoglycemic agents  Seen by endocrinology outpatient today, reduce to Lantus 5 units nightly given hemoglobin A1c of 5.5%    Continue with sliding scale insulin plus point-of-care glucose check while inpatient.

## 2024-04-19 NOTE — ASSESSMENT & PLAN NOTE
"Lab Results   Component Value Date    HGBA1C 5.5 04/08/2024       No results for input(s): \"POCGLU\" in the last 72 hours.    Blood Sugar Average: Last 72 hrs:  Holding oral hypoglycemic agents  Seen by endocrinology outpatient today, reduce to Lantus 5 units nightly given hemoglobin A1c of 5.5%  Continue with sliding scale insulin plus point-of-care glucose check    "

## 2024-04-19 NOTE — ASSESSMENT & PLAN NOTE
Lab Results   Component Value Date    EGFR 41 04/19/2024    EGFR 35 04/18/2024    EGFR 30 02/15/2024    CREATININE 1.27 04/19/2024    CREATININE 1.44 (H) 04/18/2024    CREATININE 1.61 (H) 02/15/2024   Creatinine currently at baseline  Avoid nephrotoxic agents  Monitor creatinine with a.m. BMP

## 2024-04-19 NOTE — CONSULTS
The patient's vancomycin therapy has been completed/discontinued.  Thank you for this consult; Pharmacy will sign-off now.

## 2024-04-19 NOTE — ASSESSMENT & PLAN NOTE
Lab Results   Component Value Date    EGFR 35 04/18/2024    EGFR 30 02/15/2024    EGFR 36 01/06/2024    CREATININE 1.44 (H) 04/18/2024    CREATININE 1.61 (H) 02/15/2024    CREATININE 1.39 (H) 01/06/2024   Creatinine currently at baseline  Avoid nephrotoxic agents  Monitor creatinine with a.m. BMP

## 2024-04-19 NOTE — PLAN OF CARE
Problem: OCCUPATIONAL THERAPY ADULT  Goal: Performs self-care activities at highest level of function for planned discharge setting.  See evaluation for individualized goals.  Description: Treatment Interventions: ADL retraining, Functional transfer training, UE strengthening/ROM, Endurance training, Patient/family training, Equipment evaluation/education, Compensatory technique education, Continued evaluation, Energy conservation          See flowsheet documentation for full assessment, interventions and recommendations.   Note: Limitation: Decreased ADL status, Decreased UE strength, Decreased endurance, Decreased self-care trans, Decreased high-level ADLs  Prognosis: Good  Assessment: Patient is a 78 y.o. female seen for OT evaluation s/p admit to [facility] on 4/18/2024 w/Cellulitis. Commorbidities affecting patient's functional performance at time of assessment include:  cellulitis, femoral artery stenosis, type 2 DM, HTN, diabetic ulcer, stage 3 CKD, CVA, chronic GERD, acute renal failure, HTN, polyneuropathy, R renal artery stenosis, lumbar disc herniation, lumbosacral spondylosis without myelopathy, mesenteric artery stenosis, OA, depression, carotid stenosis. Orders placed for OT evaluation and treatment.  Performed at least two patient identifiers during session including name and wristband.  Prior to admission, Patient reported she lives in a one story house, 4-5 SEKOU + ramped entrance. Patient's son lives with her and assists with transportation and medications. Patient sleeps in a Recliner and was independnet transferring to/ from a w/c. Patient has been non ambulatory for the past 2 months.  . Personal factors affecting patient at time of initial evaluation include: limited caregiver support, decreased initiation and engagement, difficulty performing ADLs, and difficulty performing IADLs. Upon evaluation, patient requires minimal  assist for UB ADLs, moderate and maximal assist for LB  ADLs. Occupational performance is affected by the following deficits: decreased muscle strength, degenerative arthritic joint changes, dynamic sit/ stand balance deficit with poor standing tolerance time for self care and functional mobility, decreased activity tolerance, decreased safety awareness, increased pain, and postural control and postural alignment deficit, requiring external assistance to complete transitional movements.  Patient to benefit from continued Occupational Therapy treatment while in the hospital to address deficits as defined above and maximize level of functional independence with ADLs and functional mobility. Occupational Performance areas to address include: bathing/ shower, dressing, toilet hygiene, transfer to all surfaces, functional mobility, emergency response, health maintenance, IADLs: safety procedures, and IADLs: meal prep/ clean up. From OT standpoint, recommendation at time of d/c would be Level 2.     Rehab Resource Intensity Level, OT: II (Moderate Resource Intensity)

## 2024-04-19 NOTE — ASSESSMENT & PLAN NOTE
Known history, follows with vascular surgery outpatient.    Arterial Doppler completed in ED showed decrease in the LISA's. bilaterally, and the occlusion of the right mid SFA now extends into the proximal portion.    Patient is not interested in any surgical intervention this time.  ED discussed case with vascular surgery, no acute interventions.  Continue inpatient versus outpatient follow-up  Continue with plavix statin

## 2024-04-19 NOTE — CONSULTS
Consultation - Podiatric Surgery    Anamaria Parnell 78 y.o. female MRN: [id number]  Unit/Bed#: -01 Encounter: [id number]      Assessment/Plan     Assessment:  78M diabetic presenting with right hallux wound  Plan:  -Patient seen & examined at bedside this morning. Clinical image taken today.  -Right hallux wound is acutely cellulitic.  -Right distal hallux wound was sharply debrided through the subcutaneous level using a 10 surgical blade.  No purulent drainage expressed.  Fibrogranular wound base exposed.  -I dressed the right hallux wound with Adaptic, dry sterile dressing.  -Nursing order in place to start changing tomorrow with use of Santyl ointment to the wound bed.  -Pending LISA/PVR assessment.  If abnormalities, then I would recommend vascular consultation as well.  -Continue IV antibiotics per medicine recommendations.  -Right foot x-ray reviewed: No acute signs of osseous abnormalities, low suspicion of osteomyelitis.  -No podiatric surgical intervention anticipated during this patient's admission.  Follow-up information provided.    Thank you for this consultation.      History of Present Illness   Physician Requesting Consult: Andi HARDY MD  Reason for Consult / Principal Problem: Right hallux wound  HPI: Anamaria Parnell is a 78 y.o. year old female who presents with right hallux wound.  Patient was sent to hospital after assessment by diabetic doctor.  Patient states that she has had the wound for quite a while and was just told to monitor it.  Patient states that it has worsened send over the past few days.    Inpatient consult to Podiatry  Consult performed by: Narciso Persaud DPM  Consult ordered by: Nalini Nayak DO          Review of Systems    Historical Information   Past Medical History:   Diagnosis Date    Aphasia as late effect of cerebrovascular accident (CVA) 08/16/2023    Arthritis     Chronic kidney disease     Diabetes mellitus (HCC)     Embolism and thrombosis of  arteries of the lower extremities ([facility]) 01/20/2021    Endometriosis     High cholesterol     History of left common carotid artery stent placement 10/27/2023    Hypertension     Kidney disease, chronic, stage III (moderate, EGFR 30-59 ml/min) ([facility])     Lumbar disc herniation     Neuropathy     Obesity (BMI 30-39.9) 12/04/2017    Obesity, morbid (HCC) 01/20/2021    Peripheral vascular disease ([facility])     Pneumonia     Shoulder injury     left    Spinal stenosis     Stroke ([facility]) 2015    Memory loss     Past Surgical History:   Procedure Laterality Date    CARDIAC CATHETERIZATION      CAROTID STENT Left 9/7/2023    Procedure: L TCAR;  Surgeon: Nicole Gallo MD;  [facility]: [facility];  Service: Vascular    COLONOSCOPY      FL RETROGRADE PYELOGRAM  4/6/2020    FRACTURE SURGERY Right     ankle    IR CAROTID STENT  9/7/2023    OVARIAN CYST SURGERY      NM CYSTO BLADDER W/URETERAL CATHETERIZATION Bilateral 4/6/2020    Procedure: CYSTOSCOPY WITH RETROGRADE PYELOGRAM;  Surgeon: Robert Mitchell MD;  [facility];  Service: Urology    NM CYSTO W/INSERT URETERAL STENT Right 4/6/2020    Procedure: INSERTION STENT URETERAL;  Surgeon: Robert Mitchell MD;  [facility];  Service: Urology    NM CYSTO W/REMOVAL OF TUMORS SMALL N/A 4/6/2020    Procedure: TRANSURETHRAL RESECTION OF BLADDER TUMOR (TURBT);  Surgeon: Robert Mitchell MD;  [facility];  Service: Urology    ROTATOR CUFF REPAIR Left     TONSILLECTOMY       Social History   Social History     Substance and Sexual Activity   Alcohol Use Never     Social History     Substance and Sexual Activity   Drug Use Never     E-Cigarette/Vaping    E-Cigarette Use Never User      E-Cigarette/Vaping Substances    Nicotine No     THC No     CBD No     Flavoring No     Other No     Unknown No      Social History     Tobacco Use   Smoking Status Former    Current packs/day: 0.00    Average packs/day: 2.0 packs/day for 54.6 years (109.1 ttl pk-yrs)    Types:  Cigarettes    Start date: 1966    Quit date: 7/27/2020    Years since quitting: 3.7   Smokeless Tobacco Never     Family History: non-contributory    Meds/Allergies   all current active meds have been reviewed  Allergies   Allergen Reactions    Fenofibrate Other (See Comments)      blood in urine  hx  Kidney Failure    Colesevelam Other (See Comments)      leg pains    Colestipol Itching and Other (See Comments)      Swelling lower legs    Ezetimibe GI Intolerance    Statins Myalgia       Objective   Vitals: Blood pressure 163/72, pulse 60, temperature 97.9 °F (36.6 °C), resp. rate 16, height 5' (1.524 m), weight 57.9 kg (127 lb 11.2 oz), SpO2 98%.,Body mass index is 24.94 kg/m².      Intake/Output Summary (Last 24 hours) at 4/19/2024 0843  Last data filed at 4/19/2024 0501  Gross per 24 hour   Intake 200 ml   Output --   Net 200 ml     I/O last 24 hours:  In: 200 [P.O.:150; IV Piggyback:50]  Out: -     Invasive Devices       Peripheral Intravenous Line  Duration             Peripheral IV 04/18/24 Right Antecubital <1 day                    Physical Exam  Ortho Exam    Vascular:   -DP pulse is palpable B/L, PT pulse is palpable B/L   -Capillary refill time is less than 3 seconds B/L  -Pedal hair growth is diminished B/L  -Temperature is warm to cool from ankle to toes B/L   -There is mild edema noted to the B/L LE    Neuro:  -Light touch and protective sensation is diminished    Ortho:  -MMT is 5/5 for eversion, inversion, plantarflexion, dorsiflexion  -No pain on palpation noted.     Derm:  -Wound #1: Right distal hallux grade 2 ulceration.  Measures approximately 0.5 x 0.5 x 0.2 cm.  Fibrogranular wound base.  Minimal periwound erythema/color noted.  Mild pain on palpation.    Clinical Image (4/19/24):        Lab Results:   Results from last 7 days   Lab Units 04/19/24  0534 04/18/24  2310 04/18/24  1918   WBC Thousand/uL 8.83  --  9.18   HEMOGLOBIN g/dL 12.7  --  13.9   HEMATOCRIT % 38.1  --  41.6   PLATELETS  Thousands/uL 258   < > 303   SEGS PCT %  --   --  71   LYMPHO PCT %  --   --  16   MONO PCT %  --   --  7   EOS PCT %  --   --  4    < > = values in this interval not displayed.       Imaging Studies: I have personally reviewed pertinent reports.      Code Status: Level 3 - DNAR and DNI    Narciso Persaud DPM  [facility]  [facility] [place]lee Rob[place], [place]

## 2024-04-19 NOTE — ASSESSMENT & PLAN NOTE
Known history, follows with vascular surgery outpatient.    Arterial Doppler completed in ED showed decrease in the LISA's. bilaterally, and the occlusion of the right mid SFA now extends into the proximal portion.  Patient is poor surgical candidate.  ED discussed case with vascular surgery, no acute interventions.  Continue inpatient versus outpatient follow-up  Continue with plavix statin

## 2024-04-19 NOTE — OCCUPATIONAL THERAPY NOTE
Occupational Therapy Evaluation        Patient Name: Anamaria Parnell  Today's Date: 4/19/2024 04/19/24 0837   OT Last Visit   OT Visit Date 04/19/24   Note Type   Note type Evaluation   Pain Assessment   Pain Assessment Tool 0-10   Pain Score No Pain   Restrictions/Precautions   Weight Bearing Precautions Per Order   (maintained NWB R LE this session pending podiatry consult)   Braces or Orthoses Other (Comment)  (none reported)   Other Precautions Bed Alarm;Chair Alarm;WBS;Fall Risk   Home Living   Type of Home House   Home Layout One level;Ramped entrance;Able to live on main level with bedroom/bathroom  (4-5 SEKOU)   Bathroom Shower/Tub Walk-in shower   Bathroom Toilet Standard   Bathroom Equipment Grab bars in shower;Shower chair;Grab bars around toilet;Commode   Bathroom Accessibility Accessible   Home Equipment Walker;Cane   Additional Comments non ambulatory for the past 2 months   Prior Function   Level of [place] Independent with ADLs;Independent with functional mobility   Lives With Son;Family   Receives Help From Family   IADLs Family/Friend/Other provides transportation;Independent with meal prep;Family/Friend/Other provides medication management   Falls in the last 6 months 1 to 4  (3 falls)   Vocational Retired   Comments sleeps in a Reclinerand stands pivot into the wheelchair (electric).   Lifestyle   Autonomy Patient reported she lives in a one story house, 4-5 SEKOU + ramped entrance. Patient's son lives with her and assists with transportation and medications. Patient sleeps in a Recliner and was independnet transferring to/ from a w/c. Patient has been non ambulatory for the past 2 months.   Reciprocal Relationships Supportive family   Service to Others / BJs   Intrinsic Gratification enjoys cleaning and keeping the house organized   General   Family/Caregiver Present No   ADL   Where Assessed   (ADL levels based on functional performance during OT eval)   Eating  Assistance 6  Modified independent   Grooming Assistance 5  Supervision/Setup   UB Bathing Assistance 4  Minimal Assistance   LB Bathing Assistance 3  Moderate Assistance   UB Dressing Assistance 4  Minimal Assistance   LB Dressing Assistance 3  Moderate Assistance   Toileting Assistance  3  Moderate Assistance   Functional Assistance 3  Moderate Assistance   Bed Mobility   Supine to Sit 4  Minimal assistance   Additional items Assist x 1;HOB elevated;Bedrails;Increased time required;Verbal cues   Transfers   Sit to Stand 4  Minimal assistance   Additional items Assist x 1;Armrests;Increased time required;Verbal cues   Stand to Sit 4  Minimal assistance   Additional items Assist x 1;Armrests;Increased time required;Verbal cues   Additional Comments vc for offloading RLE   Functional Mobility   Functional Mobility 4  Minimal assistance   Additional Comments assist of 1 / using armrest for support to complet stand pivot transfer towards left side   Balance   Static Sitting Fair +   Dynamic Sitting Fair   Static Standing Poor +   Dynamic Standing Poor +   Activity Tolerance   Activity Tolerance Patient limited by fatigue   Medical Staff Made Aware Pt seen as a co-eval with PT due to the patient's co-morbidities, clinically unstable presentation, and present impairments which are a regression from the patient's baseline.   RUE Assessment   RUE Assessment X   RUE Strength   RUE Overall Strength Deficits   LUE Assessment   LUE Assessment X   LUE Strength   LUE Overall Strength Deficits   Hand Function   Gross Motor Coordination Functional   Fine Motor Coordination Functional   Sensation   Light Touch No apparent deficits  (BUEs)   Proprioception   Proprioception No apparent deficits  (BUEs)   Vision-Basic Assessment   Current Vision Wears glasses for distance only   Patient Visual Report Blurring of vision when changing focal distance  (increased acuity deficit)   Psychosocial   Psychosocial (WDL) WDL   Cognition    Overall Cognitive Status WFL   Arousal/Participation Alert;Responsive;Cooperative   Attention Within functional limits   Orientation Level Oriented X4   Memory Within functional limits   Following Commands Follows all commands and directions without difficulty   Assessment   Limitation Decreased ADL status;Decreased UE strength;Decreased endurance;Decreased self-care trans;Decreased high-level ADLs   Prognosis Good   Assessment Patient is a 78 y.o. female seen for OT evaluation s/p admit to [facility] on 4/18/2024 w/Cellulitis. Commorbidities affecting patient's functional performance at time of assessment include:  cellulitis, femoral artery stenosis, type 2 DM, HTN, diabetic ulcer, stage 3 CKD, CVA, chronic GERD, acute renal failure, HTN, polyneuropathy, R renal artery stenosis, lumbar disc herniation, lumbosacral spondylosis without myelopathy, mesenteric artery stenosis, OA, depression, carotid stenosis. Orders placed for OT evaluation and treatment.  Performed at least two patient identifiers during session including name and wristband.  Prior to admission, Patient reported she lives in a one story house, 4-5 SEKOU + ramped entrance. Patient's son lives with her and assists with transportation and medications. Patient sleeps in a Recliner and was independnet transferring to/ from a w/c. Patient has been non ambulatory for the past 2 months.  . Personal factors affecting patient at time of initial evaluation include: limited caregiver support, decreased initiation and engagement, difficulty performing ADLs, and difficulty performing IADLs. Upon evaluation, patient requires minimal  assist for UB ADLs, moderate and maximal assist for LB ADLs. Occupational performance is affected by the following deficits: decreased muscle strength, degenerative arthritic joint changes, dynamic sit/ stand balance deficit with poor standing tolerance time for self care and functional mobility, decreased activity  tolerance, decreased safety awareness, increased pain, and postural control and postural alignment deficit, requiring external assistance to complete transitional movements.  Patient to benefit from continued Occupational Therapy treatment while in the hospital to address deficits as defined above and maximize level of functional independence with ADLs and functional mobility. Occupational Performance areas to address include: bathing/ shower, dressing, toilet hygiene, transfer to all surfaces, functional mobility, emergency response, health maintenance, IADLs: safety procedures, and IADLs: meal prep/ clean up. From OT standpoint, recommendation at time of d/c would be Level 2.   Plan   Treatment Interventions ADL retraining;Functional transfer training;UE strengthening/ROM;Endurance training;Patient/family training;Equipment evaluation/education;Compensatory technique education;Continued evaluation;Energy conservation   Goal Expiration Date 05/03/24   OT Frequency 2-3x/wk   Discharge Recommendation   Rehab Resource Intensity Level, OT II (Moderate Resource Intensity)   AM-PAC Daily Activity Inpatient   Lower Body Dressing 2   Bathing 2   Toileting 2   Upper Body Dressing 3   Grooming 4   Eating 4   Daily Activity Raw Score 17   Daily Activity Standardized Score (Calc for Raw Score >=11) 37.26   AM-PAC Applied Cognition Inpatient   Following a Speech/Presentation 4   Understanding Ordinary Conversation 4   Taking Medications 4   Remembering Where Things Are Placed or Put Away 4   Remembering List of 4-5 Errands 4   Taking Care of Complicated Tasks 3   Applied Cognition Raw Score 23   Applied Cognition Standardized Score 53.08   Barthel Index   Feeding 10   Bathing 0   Grooming Score 0   Dressing Score 5   Bladder Score 10   Bowels Score 10   Toilet Use Score 5   Transfers (Bed/Chair) Score 5   Mobility (Level Surface) Score 0   Stairs Score 0   Barthel Index Score 45         1 - Patient will verbalize and  demonstrate use of energy conservation/ deep breathing technique and work simplification skills during functional activity with no verbal cues.    2 - Patient will verbalize and demonstrate good body mechanics and joint protection techniques during  ADLs/ IADLs with no verbal cues.    3 - Patient will increase OOB/ sitting tolerance to 2-4 hours per day for increased participation in self care and leisure tasks with no s/s of exertion.    4 - Patient will increase standing tolerance time to 5  minutes with unilateral UE support to complete sink level ADLs@ mod I level.    5 - Patient will increase sitting tolerance at edge of bed to 20 minutes to complete UB ADLs @ set up assist level.    6 - Patient will transfer bed to Chair / toilet at Set up assist level with AD as indicated.     7 - Patient will complete UB ADLs with set up assist.     8 - Patient will complete LB ADLs with min assist with the use of adaptive equipment.     9 - Patient will complete toileting hygiene with set up assist/ supervision for thoroughness    10 - Patient/ Family  will demonstrate competency with UE Home Exercise Program.

## 2024-04-19 NOTE — PLAN OF CARE
Problem: Prexisting or High Potential for Compromised Skin Integrity  Goal: Skin integrity is maintained or improved  Description: INTERVENTIONS:  - Identify patients at risk for skin breakdown  - Assess and monitor skin integrity  - Assess and monitor nutrition and hydration status  - Monitor labs   - Assess for incontinence   - Turn and reposition patient  - Assist with mobility/ambulation  - Relieve pressure over bony prominences  - Avoid friction and shearing  - Provide appropriate hygiene as needed including keeping skin clean and dry  - Evaluate need for skin moisturizer/barrier cream  - Collaborate with interdisciplinary team   - Patient/family teaching  - Consider wound care consult   4/19/2024 0324 by Razia Mcfarland RN  Outcome: Progressing  4/19/2024 0323 by Razia Mcfarland RN  Outcome: Not Progressing     Problem: PAIN - ADULT  Goal: Verbalizes/displays adequate comfort level or baseline comfort level  Description: Interventions:  - Encourage patient to monitor pain and request assistance  - Assess pain using appropriate pain scale  - Administer analgesics based on type and severity of pain and evaluate response  - Implement non-pharmacological measures as appropriate and evaluate response  - Consider cultural and social influences on pain and pain management  - Notify physician/advanced practitioner if interventions unsuccessful or patient reports new pain  4/19/2024 0324 by Razia Mcfarland RN  Outcome: Progressing  4/19/2024 0323 by Razia Mcfarland RN  Outcome: Not Progressing     Problem: INFECTION - ADULT  Goal: Absence or prevention of progression during hospitalization  Description: INTERVENTIONS:  - Assess and monitor for signs and symptoms of infection  - Monitor lab/diagnostic results  - Monitor all insertion sites, i.e. indwelling lines, tubes, and drains  - Monitor endotracheal if appropriate and nasal secretions for changes in amount and color  - [place] appropriate cooling/warming therapies per  order  - Administer medications as ordered  - Instruct and encourage patient and family to use good hand hygiene technique  - Identify and instruct in appropriate isolation precautions for identified infection/condition  4/19/2024 0324 by Razia Mcfarland RN  Outcome: Progressing  4/19/2024 0323 by Razia Mcfarland RN  Outcome: Not Progressing  Goal: Absence of fever/infection during neutropenic period  Description: INTERVENTIONS:  - Monitor WBC    4/19/2024 0324 by Razia Mcfarland RN  Outcome: Progressing  4/19/2024 0323 by Razia Mcfarland RN  Outcome: Not Progressing     Problem: SAFETY ADULT  Goal: Patient will remain free of falls  Description: INTERVENTIONS:  - Educate patient/family on patient safety including physical limitations  - Instruct patient to call for assistance with activity   - Consult OT/PT to assist with strengthening/mobility   - Keep Call bell within reach  - Keep bed low and locked with side rails adjusted as appropriate  - Keep care items and personal belongings within reach  - Initiate and maintain comfort rounds  - Make Fall Risk Sign visible to staff  - Offer Toileting every 2 Hours, in advance of need  - Initiate/Maintain bed alarm  - Obtain necessary fall risk management equipment: walker  - Apply yellow socks and bracelet for high fall risk patients  - Consider moving patient to room near nurses station  4/19/2024 0324 by Razia Mcfarland RN  Outcome: Progressing  4/19/2024 0323 by Razia Mcfarland RN  Outcome: Not Progressing

## 2024-04-19 NOTE — PROGRESS NOTES
[facility]  Progress Note  Name: Anamaria Parnell I  MRN: [id number]  Unit/Bed#: -01 I Date of Admission: 4/18/2024   Date of Service: 4/19/2024 I Hospital Day: 0    Assessment/Plan   * Cellulitis  Assessment & Plan  Cellulitis of right foot with associated known diabetic ulcer, ascending up to ankle.   Patient was given IV Ancef, Flagyl, vancomycin.  Not meeting sepsis criteria.  Blood cultures pending.    Current on encounter patient is afebrile, hemodynamically stable.  Right foot noted without significant erythema and appears to be improving compared to yesterday per patient.  Patient does have known history of aortoiliac artery disease.  She is currently not interested in any aggressive surgical intervention.  Discontinue vancomycin and Flagyl.  Continue with IV cefazolin for now.  Follow-up with vascular surgery recommendation.  Continue supportive care.      Stage 3 chronic kidney disease (HCC)  Assessment & Plan  Lab Results   Component Value Date    EGFR 41 04/19/2024    EGFR 35 04/18/2024    EGFR 30 02/15/2024    CREATININE 1.27 04/19/2024    CREATININE 1.44 (H) 04/18/2024    CREATININE 1.61 (H) 02/15/2024   Creatinine currently at baseline  Avoid nephrotoxic agents  Monitor creatinine with a.m. BMP    Diabetic ulcer of toe of right foot associated with diabetes mellitus due to underlying condition, limited to breakdown of skin (HCC)  Assessment & Plan  Lab Results   Component Value Date    HGBA1C 5.5 04/08/2024       Recent Labs     04/19/24  0721 04/19/24  1049 04/19/24  1603   POCGLU 108 179* 104       Blood Sugar Average: Last 72 hrs:  (P) [id number] history of diabetic ulcer of toe, seen outpatient and advised to come to the ED due to worsening erythema edema and associated cellulitis.  Reported to have discharge at site  Not meeting sepsis criteria  Continue with IV cefazolin for now.  Podiatry consulted, appreciate recommendations        Hypertensive  urgency  Assessment & Plan  Present on admission  Currently blood pressure much better.  Continue home regimen.    Type 2 diabetes mellitus, without long-term current use of insulin ([facility])  Assessment & Plan  Lab Results   Component Value Date    HGBA1C 5.5 04/08/2024       Recent Labs     04/19/24  0721 04/19/24  1049 04/19/24  1603   POCGLU 108 179* 104       Blood Sugar Average: Last 72 hrs:  (P) [id number]  Holding oral hypoglycemic agents  Seen by endocrinology outpatient today, reduce to Lantus 5 units nightly given hemoglobin A1c of 5.5%    Continue with sliding scale insulin plus point-of-care glucose check while inpatient.      Femoral artery stenosis, right (HCC) 50-75% stenosis in the common femoral artery.  Assessment & Plan  Known history, follows with vascular surgery outpatient.    Arterial Doppler completed in ED showed decrease in the LISA's. bilaterally, and the occlusion of the right mid SFA now extends into the proximal portion.    Patient is not interested in any surgical intervention this time.  ED discussed case with vascular surgery, no acute interventions.  Continue inpatient versus outpatient follow-up  Continue with plavix statin               VTE Pharmacologic Prophylaxis: VTE Score: 4 Moderate Risk (Score 3-4) - Pharmacological DVT Prophylaxis Ordered: heparin.    Mobility:   Basic Mobility Inpatient Raw Score: 12  -HL Goal: 4: Move to chair/commode  -HL Achieved: 4: Move to chair/commode      Patient Centered Rounds: I performed bedside rounds with nursing staff today.   Discussions with Specialists or Other Care Team Provider: Vascular surgery      Total Time Spent on Date of Encounter in care of patient: 35 mins. This time was spent on one or more of the following: performing physical exam; counseling and coordination of care; obtaining or reviewing history; documenting in the medical record; reviewing/ordering tests, medications or procedures; communicating with other  healthcare professionals and discussing with patient's family/caregivers.    Current Length of Stay: 0 day(s)  Current Patient Status: Inpatient   Certification Statement: The patient will continue to require additional inpatient hospital stay due to right lower extremity cellulitis currently on IV antibiotics.  Discharge Plan: Anticipate discharge in 24-48 hrs to discharge location to be determined pending rehab evaluations.    Code Status: Level 3 - DNAR and DNI    Subjective:   Seen during a.m. rounds.  Patient appears comfortable nondistressed.  Tolerating antibiotics well.  Reports right lower extremity erythema, edema improving.  Patient does report having right lower extremity neuropathy, paresthesia.  Currently denies right lower extremity pain at rest.  Denies fever, chills, nausea, vomiting, diarrhea, any other new complaints.  No other events reported.    Objective:     Vitals:   Temp (24hrs), Av.3 °F (36.8 °C), Min:97.9 °F (36.6 °C), Max:98.7 °F (37.1 °C)    Temp:  [97.9 °F (36.6 °C)-98.7 °F (37.1 °C)] 98.3 °F (36.8 °C)  HR:  [60-78] 61  Resp:  [16-18] 16  BP: (102-203)/(52-81) 102/52  SpO2:  [98 %-99 %] 98 %  Body mass index is 24.94 kg/m².     Input and Output Summary (last 24 hours):     Intake/Output Summary (Last 24 hours) at 2024 1809  Last data filed at 2024 0900  Gross per 24 hour   Intake 500 ml   Output --   Net 500 ml       Physical Exam:   Physical Exam  Constitutional:       General: She is not in acute distress.     Appearance: Normal appearance. She is not ill-appearing, toxic-appearing or diaphoretic.   HENT:      Head: Normocephalic and atraumatic.   Eyes:      Pupils: Pupils are equal, round, and reactive to light.   Cardiovascular:      Rate and Rhythm: Normal rate.      Pulses: Normal pulses.   Pulmonary:      Effort: Pulmonary effort is normal. No respiratory distress.      Breath sounds: No wheezing.   Abdominal:      General: Bowel sounds are normal. There is no  distension.      Palpations: Abdomen is soft.      Tenderness: There is no abdominal tenderness.   Musculoskeletal:      Right lower leg: Edema present.      Comments: Right lower extremity noted with warm skin to touch, right first toe ulcer noted.  Refer to media for image.   Neurological:      Mental Status: She is alert and oriented to person, place, and time.   Psychiatric:         Mood and Affect: Mood normal.         Behavior: Behavior normal.          Additional Data:     Labs:  Results from last 7 days   Lab Units 04/19/24  0534 04/18/24  2310 04/18/24 1918   WBC Thousand/uL 8.83  --  9.18   HEMOGLOBIN g/dL 12.7  --  13.9   HEMATOCRIT % 38.1  --  41.6   PLATELETS Thousands/uL 258   < > 303   SEGS PCT %  --   --  71   LYMPHO PCT %  --   --  16   MONO PCT %  --   --  7   EOS PCT %  --   --  4    < > = values in this interval not displayed.     Results from last 7 days   Lab Units 04/19/24  0534 04/18/24 1918   SODIUM mmol/L 140 142   POTASSIUM mmol/L 3.6 3.8   CHLORIDE mmol/L 110* 105   CO2 mmol/L 20* 27   BUN mg/dL 29* 33*   CREATININE mg/dL 1.27 1.44*   ANION GAP mmol/L 10 10   CALCIUM mg/dL 8.9 9.5   ALBUMIN g/dL  --  4.1   TOTAL BILIRUBIN mg/dL  --  0.35   ALK PHOS U/L  --  105*   ALT U/L  --  8   AST U/L  --  10*   GLUCOSE RANDOM mg/dL 121 148*     Results from last 7 days   Lab Units 04/18/24 1918   INR  0.92     Results from last 7 days   Lab Units 04/19/24  1603 04/19/24  1049 04/19/24  0721   POC GLUCOSE mg/dl 104 179* 108         Results from last 7 days   Lab Units 04/18/24 1918   LACTIC ACID mmol/L 1.4   PROCALCITONIN ng/ml 0.07       Lines/Drains:  Invasive Devices       Peripheral Intravenous Line  Duration             Peripheral IV 04/18/24 Right Antecubital <1 day                          Imaging: Reviewed radiology reports from this admission including: ultrasound(s)    Recent Cultures (last 7 days):   Results from last 7 days   Lab Units 04/18/24 1918   BLOOD CULTURE  Received in  Microbiology Lab. Culture in Progress.  Received in Microbiology Lab. Culture in Progress.       Last 24 Hours Medication List:   Current Facility-Administered Medications   Medication Dose Route Frequency Provider Last Rate    acetaminophen  650 mg Oral Q6H PRN [facility]      allopurinol  50 mg Oral Every Other Day [facility], [facility]      amLODIPine  5 mg Oral Daily [facility], DO      aspirin  81 mg Oral Daily [facility], DO      cefazolin  2,000 mg Intravenous Q12H [facility], DO 2,000 mg (04/19/24 1004)    cinacalcet  30 mg Oral Every Other Day [facility]      cloNIDine  1 patch Transdermal Weekly [facility],       [START ON 4/20/2024] collagenase   Topical Daily Narciso Persaud DPM      escitalopram  20 mg Oral Daily [facility], DO      heparin (porcine)  5,000 Units Subcutaneous Q8H [facility], [facility]      insulin lispro  1-5 Units Subcutaneous TID AC [facility], DO      labetalol  300 mg Oral Q12H [facility], DO      losartan  100 mg Oral Daily [facility], DO      metroNIDAZOLE  500 mg Oral Q8H [facility], DO      pravastatin  80 mg Oral Daily With Dinner [facility], DO      ticagrelor  90 mg Oral Q12H [facility], DO      traZODone  50 mg Oral HS [facility], DO          Today, Patient Was Seen By: Andi Velez MD    **Please Note: This note may have been constructed using a voice recognition system.**

## 2024-04-19 NOTE — CONSULTS
Consult Note - Vascular Surgery   [facility]: [phone]    Assessment:  78 year old female, former smoker, with PMH type II DM, HLD, HTN, CAD, PAD/AIOD with chronic right foot wounds, CKD 4, obesity, and L MCA and PCA CVA s/p L TCAR 9/7/23 ([facility]). She presented to the ED 4/19 after seeing podiatry due to concern for right foot cellulitis. Symptoms have significantly improved today. Vascular surgery was consulted for evaluation and recommendations given hx of significant PAD.     Imaging-  Right foot XR: No evidence of OM.     LEAD 4/18 (preliminary): RLE- severe diffuse disease in fem-pop arteries with high grade vs occlusion in proximal-mid SFA with reconstitution distally. R LISA 0.10/-/-. L LISA 0.35/50/45    Labs-  WBC 8.83  Hgb 12.7  Cr 1.27/ GFR 41  Blood cx pending  A1c 5.7% 1/6/24      Plan:  -CLTI with cellulitis of the right foot; improving on antibiotics. Nonpalpable pedal and femoral pulses in RLE. +faint DP signal.   -XR right foot negative of OM.   -LEAD 4/18 demonstrates proximal-mid SFA occlusion with reconstitution distally. LISA decreased from 0.29 to 0.10. MTP and GTP not audible.   -Patient clinically improving. She denies any worsening claudication or rest pain. No acute vascular intervention recommended.   -We discussed long term management options which were previously discussed by Dr. Gallo at her last OV that if surgical revascularization is pursued that she should require a R femoral endarterectomy with iliac stenting. However, given age and significant co-morbidities patient is high risk, she had elected to continue medical management. She does not wish to pursue surgical intervention at this time.   -We will have her follow up with Dr. Gallo in outpatient setting.   -Continue outpatient follow up with podiatry.   -Discussed that if her wounds progress or she develops OM or sepsis, that she is at high risk for more proximal amputation.   -Continue ASA, brilinta, statin.   -[facility]  "per podiatry.   -Abx per [facility]IM.   -D/w SLIM.   -D/w Dr. Reynaga.   -Vascular surgery will sign off. Please reach out with questions or concerns.   ______________________________________________________________________    Consulting Service: SLIM    Chief Complaint: Right foot wounds    HPI: Anamaria Parnell is a 78 y.o. female , former smoker, with PMH type II DM, HLD, HTN, CAD, PAD/AIOD with chronic right foot wounds, CKD 4, obesity, and L MCA and PCA CVA s/p L TCAR 9/7/23 ([facility]). She presented to the ED 4/19 after seeing podiatry due to concern for right foot cellulitis. Symptoms have significantly improved today. Vascular surgery was consulted for evaluation and recommendations given hx of significant PAD.     She reports chronic short distance claudication that is unchanged. She states she ambulates short distances due to her past CVA that has affected her RLE. She states she is able to walk around her house and does her own cleaning. She states when her legs are starting to bother her she will sit down and rest. She denies rest pain. She has chronic right foot wounds and has been seeing podiatry for wound care and periodic evaluation. She states she saw them yesterday and was instructed to go to the ED. She states her foot yesterday was very swollen, red and felt like it was \"burning\". Her symptoms have significantly improved with antibiotics. She reports compliance with her aspirin, brilinta and statin at home. She does not wish to pursue any vascular surgical intervention at this time due to her other co-morbidities and also wishes to go home.     Review of Systems:  General: negative  Cardiovascular: no chest pain or dyspnea on exertion  Respiratory: no cough, shortness of breath, or wheezing  Gastrointestinal: no abdominal pain, change in bowel habits, or black or bloody stools  Genitourinary ROS: no dysuria, trouble voiding, or hematuria  Musculoskeletal ROS: negative  Neurological ROS: numbness and " tingling in R foot  Hematological and Lymphatic ROS: negative  Dermatological ROS:  Right foot wounds  Psychological ROS: negative  Ophthalmic ROS: negative  ENT ROS: negative    Past Medical History:  Past Medical History:   Diagnosis Date    Aphasia as late effect of cerebrovascular accident (CVA) 08/16/2023    Arthritis     Chronic kidney disease     Diabetes mellitus (HCC)     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/20/2021    Endometriosis     High cholesterol     History of left common carotid artery stent placement 10/27/2023    Hypertension     Kidney disease, chronic, stage III (moderate, EGFR 30-59 ml/min) ([facility])     Lumbar disc herniation     Neuropathy     Obesity (BMI 30-39.9) 12/04/2017    Obesity, morbid (HCC) 01/20/2021    Peripheral vascular disease ([facility])     Pneumonia     Shoulder injury     left    Spinal stenosis     Stroke ([facility]) 2015    Memory loss       Past Surgical History:  Past Surgical History:   Procedure Laterality Date    CARDIAC CATHETERIZATION      CAROTID STENT Left 9/7/2023    Procedure: L TCAR;  Surgeon: Nicole Gallo MD;  [facility];  Service: Vascular    COLONOSCOPY      FL RETROGRADE PYELOGRAM  4/6/2020    FRACTURE SURGERY Right     ankle    IR CAROTID STENT  9/7/2023    OVARIAN CYST SURGERY      DC CYSTO BLADDER W/URETERAL CATHETERIZATION Bilateral 4/6/2020    Procedure: CYSTOSCOPY WITH RETROGRADE PYELOGRAM;  Surgeon: Robert Mitchell MD;  [facility];  Service: Urology    DC CYSTO W/INSERT URETERAL STENT Right 4/6/2020    Procedure: INSERTION STENT URETERAL;  Surgeon: Robert Mitchell MD;  [facility];  Service: Urology    DC CYSTO W/REMOVAL OF TUMORS SMALL N/A 4/6/2020    Procedure: TRANSURETHRAL RESECTION OF BLADDER TUMOR (TURBT);  Surgeon: Robert Mitchell MD;  [facility];  Service: Urology    ROTATOR CUFF REPAIR Left     TONSILLECTOMY         Social History:  Social History     Substance and Sexual  Activity   Alcohol Use Never     Social History     Substance and Sexual Activity   Drug Use Never     Social History     Tobacco Use   Smoking Status Former    Current packs/day: 0.00    Average packs/day: 2.0 packs/day for 54.6 years (109.1 ttl pk-yrs)    Types: Cigarettes    Start date: 1966    Quit date: 7/27/2020    Years since quitting: 3.7   Smokeless Tobacco Never       Family History:  Family History   Problem Relation Age of Onset    Hypertension Mother     Heart disease Mother         Valvular    Hyperlipidemia Mother     Hypertension Father     Heart defect Father         Cardiomegaly    Stroke Sister         Cerebrovascular Accident    Arthritis Brother     Other Brother         Back Disorder       Allergies:  Allergies   Allergen Reactions    Fenofibrate Other (See Comments)      blood in urine  hx  Kidney Failure    Colesevelam Other (See Comments)      leg pains    Colestipol Itching and Other (See Comments)      Swelling lower legs    Ezetimibe GI Intolerance    Statins Myalgia       Medications:  Current Facility-Administered Medications   Medication Dose Route Frequency    acetaminophen (TYLENOL) tablet 650 mg  650 mg Oral Q6H PRN    allopurinol (ZYLOPRIM) tablet 50 mg  50 mg Oral Every Other Day    amLODIPine (NORVASC) tablet 5 mg  5 mg Oral Daily    aspirin chewable tablet 81 mg  81 mg Oral Daily    ceFAZolin (ANCEF) IVPB (premix in dextrose) 2,000 mg 50 mL  2,000 mg Intravenous Q12H    cinacalcet (SENSIPAR) tablet 30 mg  30 mg Oral Every Other Day    cloNIDine (CATAPRES-TTS-3) 0.3 mg/24 hr TD weekly patch  1 patch Transdermal Weekly    escitalopram (LEXAPRO) tablet 20 mg  20 mg Oral Daily    heparin (porcine) subcutaneous injection 5,000 Units  5,000 Units Subcutaneous Q8H [facility]    insulin lispro (HumALOG/ADMELOG) 100 units/mL subcutaneous injection 1-5 Units  1-5 Units Subcutaneous TID AC    labetalol (NORMODYNE) tablet 300 mg  300 mg Oral Q12H [facility]    losartan (COZAAR) tablet 100 mg  100 mg Oral  "Daily    metroNIDAZOLE (FLAGYL) tablet 500 mg  500 mg Oral Q8H JAVIER    pravastatin (PRAVACHOL) tablet 80 mg  80 mg Oral Daily With Dinner    ticagrelor (BRILINTA) tablet 90 mg  90 mg Oral Q12H JAVIER    traZODone (DESYREL) tablet 50 mg  50 mg Oral HS    vancomycin (VANCOCIN) IVPB (premix in dextrose) 500 mg 100 mL  500 mg Intravenous Q24H       Vitals:  Vitals:    04/19/24 0707   BP: 163/72   Pulse: 60   Resp:    Temp: 97.9 °F (36.6 °C)   SpO2: 98%       I/Os:  I/O last 3 completed shifts:  In: 200 [P.O.:150; IV Piggyback:50]  Out: -   I/O this shift:  In: 300 [P.O.:300]  Out: -     Lab Results and Cultures:   CBC with diff:   Lab Results   Component Value Date    WBC 8.83 04/19/2024    HGB 12.7 04/19/2024    HCT 38.1 04/19/2024    MCV 88 04/19/2024     04/19/2024    RBC 4.31 04/19/2024    MCH 29.5 04/19/2024    MCHC 33.3 04/19/2024    RDW 13.4 04/19/2024    MPV 10.0 04/19/2024    NRBC 0 04/18/2024   ,   BMP/CMP:  Lab Results   Component Value Date    K 3.6 04/19/2024    K 3.8 10/20/2020     (H) 04/19/2024    CL 98 10/20/2020    CO2 20 (L) 04/19/2024    CO2 22 09/07/2023    CO2 23 10/20/2020    BUN 29 (H) 04/19/2024    BUN 45 (H) 10/20/2020    CREATININE 1.27 04/19/2024    GLUCOSE 130 09/07/2023    CALCIUM 8.9 04/19/2024    AST 10 (L) 04/18/2024    AST 14 09/08/2020    ALT 8 04/18/2024    ALT 13 09/08/2020    ALKPHOS 105 (H) 04/18/2024    EGFR 41 04/19/2024   ,   Lipid Panel: No results found for: \"CHOL\",   Coags:   Lab Results   Component Value Date    PTT 34 04/18/2024    INR 0.92 04/18/2024   ,     Blood Culture:   Lab Results   Component Value Date    BLOODCX Received in Microbiology Lab. Culture in Progress. 04/18/2024    BLOODCX Received in Microbiology Lab. Culture in Progress. 04/18/2024   ,   Urinalysis:   Lab Results   Component Value Date    COLORU Yellow 04/08/2024    CLARITYU Extra Turbid 04/08/2024    SPECGRAV 1.019 04/08/2024    PHUR 6.0 04/08/2024    LEUKOCYTESUR Large (A) 04/08/2024    " "NITRITE Negative 04/08/2024    GLUCOSEU Negative 04/08/2024    KETONESU Negative 04/08/2024    BILIRUBINUR Negative 04/08/2024    BLOODU Trace (A) 04/08/2024   ,   Urine Culture:   Lab Results   Component Value Date    URINECX >100,000 cfu/ml Escherichia coli (A) 08/22/2023   ,   Wound Culure: No results found for: \"WOUNDCULT\"    Imaging:  As above    Physical Exam:    General appearance: alert and oriented, in no acute distress  Head: Normocephalic, without obvious abnormality, atraumatic  Lungs: clear to auscultation bilaterally  Chest wall: no tenderness  Heart: regular rate and rhythm, S1, S2 normal, no murmur, click, rub or gallop  Abdomen: soft, non-tender; bowel sounds normal; no masses,  no organomegaly  Genitalia: deferred  Rectal: deferred  Extremities:  BLE warm with 1+ swelling in right foot with mild erythema-improved from yesterday per patient and RN. Right distal great toe and mid foot scabbing. No evidence of acute local infection.  Motor and sensation deficits at baseline due to hx of neuropathy and RLE weakness from prior CVA.   Neurologic: Grossly normal. At baseline.     Wound/Incision:    Right foot    Pulse exam:  Radial: Right: 2+ Left:: 2+  Femoral: Right: non-palpable Left: non-palpable  DP: Right: doppler signal and non-palpable Left: doppler signal and non-palpable  PT: Right: doppler signal and non-palpable Left: doppler signal and non-palpable    Kya Gage PA-C  4/19/2024      "

## 2024-04-19 NOTE — H&P
"[facility]  H&P  Name: Anamaria Parnell I  MRN: [id number]  Unit/Bed#: ED 17 I Date of Admission: 4/18/2024   Date of Service: 4/18/2024 I Hospital Day: 0    Assessment/Plan   Stage 3 chronic kidney disease ([facility])  Assessment & Plan  Lab Results   Component Value Date    EGFR 35 04/18/2024    EGFR 30 02/15/2024    EGFR 36 01/06/2024    CREATININE 1.44 (H) 04/18/2024    CREATININE 1.61 (H) 02/15/2024    CREATININE 1.39 (H) 01/06/2024   Creatinine currently at baseline  Avoid nephrotoxic agents  Monitor creatinine with a.m. BMP    Diabetic ulcer of toe of right foot associated with diabetes mellitus due to underlying condition, limited to breakdown of skin ([facility])  Assessment & Plan  Lab Results   Component Value Date    HGBA1C 5.5 04/08/2024       No results for input(s): \"POCGLU\" in the last 72 hours.    Blood Sugar Average: Last 72 hrs:  Known history of diabetic ulcer of toe, seen outpatient and advised to come to the ED due to worsening erythema edema and associated cellulitis.  Reported to have discharge at site  Not meeting sepsis criteria  Continue with IV vancomycin Ancef and Flagyl  Podiatry consulted, appreciate recommendations        Hypertensive urgency  Assessment & Plan  Present on admission  Resume home antihypertensive regimen  As needed IV labetalol for SBP greater than 180    Type 2 diabetes mellitus, without long-term current use of insulin ([facility])  Assessment & Plan  Lab Results   Component Value Date    HGBA1C 5.5 04/08/2024       No results for input(s): \"POCGLU\" in the last 72 hours.    Blood Sugar Average: Last 72 hrs:  Holding oral hypoglycemic agents  Seen by endocrinology outpatient today, reduce to Lantus 5 units nightly given hemoglobin A1c of 5.5%  Continue with sliding scale insulin plus point-of-care glucose check      Femoral artery stenosis, right ([facility]) 50-75% stenosis in the common femoral artery.  Assessment & Plan  Known history, follows with vascular surgery " outpatient.    Arterial Doppler completed in ED showed decrease in the LISA's. bilaterally, and the occlusion of the right mid SFA now extends into the proximal portion.  Patient is poor surgical candidate.  ED discussed case with vascular surgery, no acute interventions.  Continue inpatient versus outpatient follow-up  Continue with plavix statin    * Cellulitis  Assessment & Plan  Cellulitis of right foot with associated known diabetic ulcer, ascending up to ankle.  Purulent discharge reported  Continue with IV Ancef, Flagyl and vancomycin  Not meeting sepsis criteria  Podiatry consulted, appreciate recommendation  Monitor fever curve and WBC count           VTE Pharmacologic Prophylaxis: VTE Score: 4 Moderate Risk (Score 3-4) - Pharmacological DVT Prophylaxis Ordered: heparin.  Code Status: Prior   Discussion with family:  PT.     Anticipated Length of Stay: Patient will be admitted on an observation basis with an anticipated length of stay of less than 2 midnights secondary to cellulitis, podiatry evaluation.    Total Time Spent on Date of Encounter in care of patient: 65 mins. This time was spent on one or more of the following: performing physical exam; counseling and coordination of care; obtaining or reviewing history; documenting in the medical record; reviewing/ordering tests, medications or procedures; communicating with other healthcare professionals and discussing with patient's family/caregivers.    Chief Complaint: worsening R foot wound    History of Present Illness:  Anamaria Parnell is a 78 y.o. female with a PMH of peripheral artery disease, type 2 diabetes, chronic nonhealing wounds of bilateral feet, hypertension who presents with worsening right foot erythema and swelling.  Seen by provider outpatient, advised to come to the ED for further evaluation.  Does report some discharge noted at site.  Admitted for IV antibiotics and podiatry evaluation.    Review of Systems:  Review of Systems   Skin:   Positive for wound.   All other systems reviewed and are negative.      Past Medical and Surgical History:   Past Medical History:   Diagnosis Date    Aphasia as late effect of cerebrovascular accident (CVA) 08/16/2023    Arthritis     Chronic kidney disease     Diabetes mellitus (HCC)     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/20/2021    Endometriosis     High cholesterol     History of left common carotid artery stent placement 10/27/2023    Hypertension     Kidney disease, chronic, stage III (moderate, EGFR 30-59 ml/min) ([facility])     Lumbar disc herniation     Neuropathy     Obesity (BMI 30-39.9) 12/04/2017    Obesity, morbid (HCC) 01/20/2021    Peripheral vascular disease (HCC)     Pneumonia     Shoulder injury     left    Spinal stenosis     Stroke ([facility]) 2015    Memory loss       Past Surgical History:   Procedure Laterality Date    CARDIAC CATHETERIZATION      CAROTID STENT Left 9/7/2023    Procedure: L TCAR;  Surgeon: Nicole Gallo MD;  Location: [facility];  Service: Vascular    COLONOSCOPY      FL RETROGRADE PYELOGRAM  4/6/2020    FRACTURE SURGERY Right     ankle    IR CAROTID STENT  9/7/2023    OVARIAN CYST SURGERY      FL CYSTO BLADDER W/URETERAL CATHETERIZATION Bilateral 4/6/2020    Procedure: CYSTOSCOPY WITH RETROGRADE PYELOGRAM;  Surgeon: Robert Mitchell MD;  Location: [facility];  Service: Urology    FL CYSTO W/INSERT URETERAL STENT Right 4/6/2020    Procedure: INSERTION STENT URETERAL;  Surgeon: Robert Mitchell MD;  [facility];  Service: Urology    FL CYSTO W/REMOVAL OF TUMORS SMALL N/A 4/6/2020    Procedure: TRANSURETHRAL RESECTION OF BLADDER TUMOR (TURBT);  Surgeon: Robert Mitchell MD;  Location[facility];  Service: Urology    ROTATOR CUFF REPAIR Left     TONSILLECTOMY         Meds/Allergies:  Prior to Admission medications    Medication Sig Start Date End Date Taking? Authorizing Provider   acetaminophen (TYLENOL) 500 mg tablet Take 650 mg by mouth  every 6 (six) hours as needed for mild pain    Historical Provider, MD   allopurinol (ZYLOPRIM) 100 mg tablet TAKE 1 TABLET BY MOUTH EVERY DAY 10/19/23   Savage Longoria MD   amLODIPine (NORVASC) 5 mg tablet Take 1 tablet (5 mg total) by mouth daily Primary hypertension [I10] 4/1/24   Ritchie Lawton MD   aspirin 81 mg chewable tablet Chew 81 mg daily    Historical Provider, MD SANDY ULTRAFINE III SHORT PEN 31G X 8 MM MISC USE TWICE A DAY 1/15/24   Ritchie Lawton MD   cinacalcet (SENSIPAR) 30 mg tablet Take 1 tablet (30 mg total) by mouth every other day 4/8/24 1/3/25  Ritchie Lawton MD   cloNIDine (CATAPRES-TTS-3) 0.3 mg/24 hr Place 1 patch (0.3 mg total) on the skin over 7 days once a week 3/12/24   Ritchie Lawton MD   escitalopram (LEXAPRO) 20 mg tablet Take 1 tablet (20 mg total) by mouth daily 4/8/24 7/7/24  Ritchie Lawton MD   Glucagon 1 MG/0.2ML SOAJ Inject 1 mg under the skin if needed (low blood sugar)    Historical Provider, MD   labetalol (NORMODYNE) 300 mg tablet Take 1 tablet (300 mg total) by mouth every 12 (twelve) hours 2/7/24   Ritchie Lawton MD   Lantus SoloStar 100 units/mL SOPN 9 UNITS SUBCUTANEOUS AT BEDTIME 9/24/23   Historical Provider, MD   lidocaine (LIDODERM) 5 % Apply 1 patch topically daily Remove & Discard patch within 12 hours or as directed by MD    Historical Provider, MD   linaGLIPtin (Tradjenta) 5 MG TABS Take 5 mg by mouth daily 4/18/24   GRACIELA Pineda   losartan (COZAAR) 100 MG tablet Take 1 tablet (100 mg total) by mouth daily 1/15/24   Ritchie Lawton MD   pravastatin (PRAVACHOL) 80 mg tablet TAKE 1 TABLET BY MOUTH DAILY WITH DINNER 4/1/24   Ritchie Lawton MD   ticagrelor (BRILINTA) 90 MG Take 1 tablet (90 mg total) by mouth every 12 (twelve) hours 4/8/24 10/5/24  Ritchie Lawton MD   traZODone (DESYREL) 50 mg tablet TAKE 1 TABLET BY MOUTH DAILY AT BEDTIME 12/18/23   Ritchie Lawton MD   insulin glargine (LANTUS) 100 units/mL  subcutaneous injection Inject 8 Units under the skin daily at bedtime  Patient not taking: Reported on 4/18/2024 9/9/23 4/18/24  Jj Diego DO   insulin lispro (HumaLOG) 100 units/mL injection Inject under the skin  Patient not taking: Reported on 4/18/2024 4/18/24  Historical Provider, MD     I have reviewed home medications with patient personally.    Allergies:   Allergies   Allergen Reactions    Fenofibrate Other (See Comments)      blood in urine  hx  Kidney Failure    Colesevelam Other (See Comments)      leg pains    Colestipol Itching and Other (See Comments)      Swelling lower legs    Ezetimibe GI Intolerance    Statins Myalgia       Social History:  Marital Status: /Civil Union     Substance Use History:   Social History     Substance and Sexual Activity   Alcohol Use Never     Social History     Tobacco Use   Smoking Status Former    Current packs/day: 0.00    Average packs/day: 2.0 packs/day for 54.6 years (109.1 ttl pk-yrs)    Types: Cigarettes    Start date: 1966    Quit date: 7/27/2020    Years since quitting: 3.7   Smokeless Tobacco Never     Social History     Substance and Sexual Activity   Drug Use Never       Family History:  Family History   Problem Relation Age of Onset    Hypertension Mother     Heart disease Mother         Valvular    Hyperlipidemia Mother     Hypertension Father     Heart defect Father         Cardiomegaly    Stroke Sister         Cerebrovascular Accident    Arthritis Brother     Other Brother         Back Disorder       Physical Exam:     Vitals:   Blood Pressure: (!) 203/81 (04/18/24 2104)  Pulse: 68 (04/18/24 2104)  Temperature: 98.5 °F (36.9 °C) (04/18/24 1747)  Temp Source: Temporal (04/18/24 1747)  Respirations: 18 (04/18/24 2104)  SpO2: 98 % (04/18/24 2104)    Physical Exam  Constitutional:       General: She is not in acute distress.     Appearance: She is well-developed. She is not diaphoretic.   HENT:      Head: Normocephalic and atraumatic.      Nose:  Nose normal.      Mouth/Throat:      Pharynx: No oropharyngeal exudate.   Eyes:      General: No scleral icterus.        Right eye: No discharge.         Left eye: No discharge.      Conjunctiva/sclera: Conjunctivae normal.   Cardiovascular:      Rate and Rhythm: Normal rate and regular rhythm.      Heart sounds: Normal heart sounds. No murmur heard.     No friction rub. No gallop.   Pulmonary:      Effort: Pulmonary effort is normal. No respiratory distress.      Breath sounds: Normal breath sounds. No wheezing or rales.   Abdominal:      General: Bowel sounds are normal. There is no distension.      Palpations: Abdomen is soft.      Tenderness: There is no abdominal tenderness. There is no guarding.   Musculoskeletal:         General: Normal range of motion.      Cervical back: Normal range of motion and neck supple.   Skin:     General: Skin is warm.      Findings: No erythema.      Comments: R foot wound noted, chronic ulceration. Erythema, edema ascending up foot.    Neurological:      Mental Status: She is oriented to person, place, and time.   Psychiatric:         Behavior: Behavior normal.          Additional Data:     Lab Results:  Results from last 7 days   Lab Units 04/18/24 1918   WBC Thousand/uL 9.18   HEMOGLOBIN g/dL 13.9   HEMATOCRIT % 41.6   PLATELETS Thousands/uL 303   SEGS PCT % 71   LYMPHO PCT % 16   MONO PCT % 7   EOS PCT % 4     Results from last 7 days   Lab Units 04/18/24 1918   SODIUM mmol/L 142   POTASSIUM mmol/L 3.8   CHLORIDE mmol/L 105   CO2 mmol/L 27   BUN mg/dL 33*   CREATININE mg/dL 1.44*   ANION GAP mmol/L 10   CALCIUM mg/dL 9.5   ALBUMIN g/dL 4.1   TOTAL BILIRUBIN mg/dL 0.35   ALK PHOS U/L 105*   ALT U/L 8   AST U/L 10*   GLUCOSE RANDOM mg/dL 148*     Results from last 7 days   Lab Units 04/18/24 1918   INR  0.92             Results from last 7 days   Lab Units 04/18/24 1918   LACTIC ACID mmol/L 1.4   PROCALCITONIN ng/ml 0.07       Lines/Drains:  Invasive Devices       Peripheral  Intravenous Line  Duration             Peripheral IV 04/18/24 Right Antecubital <1 day                        Imaging: Reviewed radiology reports from this admission including: xray(s)  XR foot 3+ views RIGHT    (Results Pending)   VAS ARTERIAL DUPLEX-LOWER LIMB UNILATERAL    (Results Pending)       EKG and Other Studies Reviewed on Admission:   EKG: No EKG obtained.    ** Please Note: This note has been constructed using a voice recognition system. **

## 2024-04-20 VITALS
HEART RATE: 66 BPM | RESPIRATION RATE: 18 BRPM | WEIGHT: 127.7 LBS | HEIGHT: 60 IN | SYSTOLIC BLOOD PRESSURE: 178 MMHG | OXYGEN SATURATION: 98 % | TEMPERATURE: 97.9 F | DIASTOLIC BLOOD PRESSURE: 68 MMHG | BODY MASS INDEX: 25.07 KG/M2

## 2024-04-20 LAB
GLUCOSE SERPL-MCNC: 114 MG/DL (ref 65–140)
GLUCOSE SERPL-MCNC: 120 MG/DL (ref 65–140)

## 2024-04-20 PROCEDURE — 82948 REAGENT STRIP/BLOOD GLUCOSE: CPT

## 2024-04-20 PROCEDURE — 99239 HOSP IP/OBS DSCHRG MGMT >30: CPT | Performed by: STUDENT IN AN ORGANIZED HEALTH CARE EDUCATION/TRAINING PROGRAM

## 2024-04-20 RX ORDER — CEPHALEXIN 500 MG/1
500 CAPSULE ORAL EVERY 6 HOURS SCHEDULED
Qty: 12 CAPSULE | Refills: 0 | Status: SHIPPED | OUTPATIENT
Start: 2024-04-20 | End: 2024-04-23

## 2024-04-20 RX ADMIN — ASPIRIN 81 MG: 81 TABLET, CHEWABLE ORAL at 11:01

## 2024-04-20 RX ADMIN — CEFAZOLIN SODIUM 2000 MG: 2 SOLUTION INTRAVENOUS at 11:02

## 2024-04-20 RX ADMIN — HEPARIN SODIUM 5000 UNITS: 5000 INJECTION INTRAVENOUS; SUBCUTANEOUS at 06:02

## 2024-04-20 RX ADMIN — METRONIDAZOLE 500 MG: 500 TABLET ORAL at 06:02

## 2024-04-20 RX ADMIN — ESCITALOPRAM OXALATE 20 MG: 10 TABLET ORAL at 11:02

## 2024-04-20 RX ADMIN — TICAGRELOR 90 MG: 90 TABLET ORAL at 11:01

## 2024-04-20 RX ADMIN — LABETALOL HYDROCHLORIDE 300 MG: 100 TABLET, FILM COATED ORAL at 11:02

## 2024-04-20 RX ADMIN — AMLODIPINE BESYLATE 5 MG: 5 TABLET ORAL at 11:02

## 2024-04-20 RX ADMIN — LOSARTAN POTASSIUM 100 MG: 50 TABLET, FILM COATED ORAL at 11:02

## 2024-04-20 NOTE — ASSESSMENT & PLAN NOTE
Lab Results   Component Value Date    HGBA1C 5.5 04/08/2024       Recent Labs     04/19/24  1603 04/19/24  2048 04/20/24  0605 04/20/24  1059   POCGLU 104 113 114 120         Blood Sugar Average: Last 72 hrs:  (P) 123Known history of diabetic ulcer of toe, seen outpatient and advised to come to the ED due to worsening erythema edema and associated cellulitis.  Reported to have discharge at site  Not meeting sepsis criteria  Continue with IV cefazolin for now.  Podiatry consulted, appreciate recommendations

## 2024-04-20 NOTE — DISCHARGE INSTR - OTHER ORDERS
Skin care plans:  1-Hydraguard to bilateral sacrum, buttock and heels BID and PRN  2-Elevate heels to offload pressure.  3-Ehob cushion in chair when out of bed.  4-Moisturize skin daily with skin nourishing cream.  5-Turn/reposition q2h for pressure re-distribution on skin.   6- Right toe wound care per podiatry orders.      Right tow wound : daily dry sterile dressing,  use of Santyl ointment to the wound bed.

## 2024-04-20 NOTE — DISCHARGE SUMMARY
[facility]  Discharge- Anamaria Parnell 1945, 78 y.o. female MRN: [id number]  Unit/Bed#: [facility] Encounter: [id number]  Primary Care Provider: Ritchie Lawton MD   Date and time admitted to hospital: 4/18/2024  6:46 PM    * Cellulitis  Assessment & Plan  Cellulitis of right foot with associated known diabetic ulcer, ascending up to ankle.   Patient was given IV Ancef, Flagyl, vancomycin.  Not meeting sepsis criteria.  Blood cultures without growth in 24 hours.    Foot x-ray report noted negative for osteomyelitis.  Vascular ultrasound report noted with severe diffuse disease noted throughout the femoral-popliteal arteries on the right lower extremity with high-grade stenosis versus occlusion of proximal mid superficial femoral artery with reconstitution distally, LISA 0.10, left LISA 0.35.  Patient was seen by podiatry and had bedside debridement of the right foot toe wound.    Current on encounter patient is afebrile, hemodynamically stable.  Right foot improved significantly with IV antibiotics.  Completed 4 days of IV cefazolin, Flagyl.  Being discharged on 3 more days of Keflex to complete total 7-day course.  Given patient's age, other clinical conditions she is not eager to pursue any surgical intervention.  Vascular surgery recommendation appreciated for outpatient follow-up in clinic.    Continue with wound care recommendation from podiatry.  Currently hemodynamically stable for discharge.    Stage 3 chronic kidney disease (HCC)  Assessment & Plan  Lab Results   Component Value Date    EGFR 41 04/19/2024    EGFR 35 04/18/2024    EGFR 30 02/15/2024    CREATININE 1.27 04/19/2024    CREATININE 1.44 (H) 04/18/2024    CREATININE 1.61 (H) 02/15/2024   Creatinine currently at baseline  Avoid nephrotoxic agents  Continue outpatient follow-up with PCP.    Diabetic ulcer of toe of right foot associated with diabetes mellitus due to underlying condition, limited to breakdown of skin  ([facility])  Assessment & Plan  Lab Results   Component Value Date    HGBA1C 5.5 04/08/2024       Recent Labs     04/19/24  1603 04/19/24  2048 04/20/24  0605 04/20/24  1059   POCGLU 104 113 114 120         Blood Sugar Average: Last 72 hrs:  (P) 123    Known history of diabetic ulcer of toe, seen outpatient and advised to come to the ED due to worsening erythema edema and associated cellulitis.    Patient was seen by podiatry and had bedside debridement.  Cellulitis is improved significantly.  No signs of osteomyelitis noted.  Did well with IV cefazolin and Flagyl.  Being discharged on Keflex for total 7-day course.  Continue outpatient follow-up with PCP and podiatry in the office.      Hypertensive urgency  Assessment & Plan  Present on admission  Labile at times.  Overall controlled.  Resume home medication and outpatient follow-up with PCP.    Type 2 diabetes mellitus, without long-term current use of insulin ([facility])  Assessment & Plan  Lab Results   Component Value Date    HGBA1C 5.5 04/08/2024       Recent Labs     04/19/24  1603 04/19/24  2048 04/20/24  0605 04/20/24  1059   POCGLU 104 113 114 120         Blood Sugar Average: Last 72 hrs:  (P) 123  Holding oral hypoglycemic agents  Seen by endocrinology and management has been changed from Lantus to Tradjenta 5 mg due to hemoglobin A1c of 5.5%  Continue patient follow-up with PCP and endocrinology.      Femoral artery stenosis, right (HCC) 50-75% stenosis in the common femoral artery.  Assessment & Plan  Known history, follows with vascular surgery outpatient.    Arterial Doppler completed in ED showed decrease in the LISA's. bilaterally, and the occlusion of the right mid SFA now extends into the proximal portion.      Patient is not interested in any surgical intervention this time due to her comorbid conditions.  Vascular surgery recommendation noted for outpatient follow-up.  Continue Brilinta, statin,   Continue outpatient follow-up with vascular  surgery.        Discharging Physician / Practitioner: Andi Velez MD  PCP: Ritchie Lawton MD  Admission Date:   Admission Orders (From admission, onward)       Ordered        04/19/24 0952  INPATIENT ADMISSION  Once            04/18/24 2147  Place in Observation  Once                          Discharge Date: 04/20/24    Medical Problems       Resolved Problems  Date Reviewed: 4/20/2024   None         Consultations During Hospital Stay:  Podiatry, vascular surgery    Procedures Performed:   Debridement of right distal hallux wound.      Reason for Admission: Right toe wound, right foot cellulitis.    Hospital Course:     Anamaria Parnell is a 78 y.o. female patient with past medical history of CKD stage III, diabetes, peripheral vascular disease, hypertension, CVA, carotid artery stenosis status post TCAR, who originally presented to the hospital on 4/18/2024 due to complaining of right foot toe wound, swelling, pain, erythema.  Patient was hospitalized due to right lower extremity cellulitis, distal right toe wound.  Foot x-ray negative for osteomyelitis.  Patient did well with antibiotic course.  Was seen by podiatry and had bedside debridement of right hallux distal wound. Patient has significant peripheral artery disease especially on the right lower extremity however currently no signs of pain or claudication at rest noted, no threatened tissue loss or acute signs of ischemia noted on exam.  Patient reports that given her comorbid conditions she is not interested in pursuing any surgical intervention at this time.  Patient was seen by vascular surgery recommended continue to continue Brilinta, statin and outpatient follow-up with vascular surgery.  Patient has been cleared by podiatry for discharge as well without further surgical intervention recommended.  Did well with IV antibiotics and cellulitis significantly improved.  Patient also reports overall feeling much better and eager to go home.  Being  discharged on Keflex for 3 more days to complete total 7-day course.  Recommend close outpatient follow-up with primary care provider and podiatry as well as vascular surgery.  Patient is in agreement with all plan.  No other events reported.  Refer to earlier notes for further clarification.      Please see above list of diagnoses and related plan for additional information.     Condition at Discharge: good     Discharge Day Visit / Exam:     Subjective: Patient is in good spirits.  Seen sitting in chair.  Reports she is feeling much better.  Reports her right foot looks significantly better.  Currently denies chest pain, dyspnea, fever, chills, nausea, vomiting, right foot pain, swelling, any other new complaints.  Reports feeling much better and eager to go home.  Documented blood pressure labile however repeat blood pressure on my encounter noted 150/66.      Vitals: Blood Pressure: (!) 178/68 (04/20/24 1102)  Pulse: 66 (04/20/24 1102)  Temperature: 97.9 °F (36.6 °C) (04/20/24 0649)  Temp Source: Oral (Simultaneous filing. User may not have seen previous data.) (04/19/24 2133)  Respirations: 18 (04/20/24 0649)  Height: 5' (152.4 cm) (04/19/24 0012)  Weight - Scale: 57.9 kg (127 lb 11.2 oz) (04/19/24 0012)  SpO2: 98 % (04/20/24 0649)  Exam:   Physical Exam  Constitutional:       General: She is not in acute distress.     Appearance: Normal appearance. She is not ill-appearing, toxic-appearing or diaphoretic.   HENT:      Head: Normocephalic and atraumatic.   Eyes:      Pupils: Pupils are equal, round, and reactive to light.   Cardiovascular:      Rate and Rhythm: Normal rate.      Pulses: Normal pulses.   Pulmonary:      Effort: Pulmonary effort is normal. No respiratory distress.      Breath sounds: No wheezing.   Abdominal:      General: Bowel sounds are normal. There is no distension.      Palpations: Abdomen is soft.      Tenderness: There is no abdominal tenderness.   Musculoskeletal:      Right lower leg:  No edema.      Comments: Right lower extremity with clean, dry, intact dressing.  Refer to clinical image.   Neurological:      Mental Status: She is alert and oriented to person, place, and time.   Psychiatric:         Mood and Affect: Mood normal.         Behavior: Behavior normal.       Discussion with Family: Spoke with son Austin over the phone at length.     Discharge instructions/Information to patient and family:   See after visit summary for information provided to patient and family.      Provisions for Follow-Up Care:  See after visit summary for information related to follow-up care and any pertinent home health orders.      Disposition:     Home with [facility] (Reminder: Complete face to face encounter)        Planned Readmission:      Discharge Statement:  I spent 35 minutes discharging the patient. This time was spent on the day of discharge. I had direct contact with the patient on the day of discharge. Greater than 50% of the total time was spent examining patient, answering all patient questions, arranging and discussing plan of care with patient as well as directly providing post-discharge instructions.  Additional time then spent on discharge activities.    Discharge Medications:  See after visit summary for reconciled discharge medications provided to patient and family.      ** Please Note: This note has been constructed using a voice recognition system **

## 2024-04-20 NOTE — ASSESSMENT & PLAN NOTE
Lab Results   Component Value Date    HGBA1C 5.5 04/08/2024       Recent Labs     04/19/24  1603 04/19/24  2048 04/20/24  0605 04/20/24  1059   POCGLU 104 113 114 120         Blood Sugar Average: Last 72 hrs:  (P) 123  Holding oral hypoglycemic agents  Seen by endocrinology outpatient today, reduce to Lantus 5 units nightly given hemoglobin A1c of 5.5%    Continue with sliding scale insulin plus point-of-care glucose check while inpatient.

## 2024-04-20 NOTE — UTILIZATION REVIEW
SEE INITIAL REVIEW AT BOTTOM    Continued Stay Review    Date: 4/20                          Current Patient Class: Inpatient  Current Level of Care: Med Surg    HPI:78 y.o. female initially admitted on 4/19     Assessment/Plan: Date: 4/20  Day 3: Has surpassed a 2nd midnight with active treatments and services.  Continued Iv antibiotics. S/p Bedside debridement by Podiatry and right foot improved on Iv antibiotics.   Cellulitis improved.       Vital Signs:   04/20/24 0649 97.9 °F (36.6 °C) 63 18 155/66 -- 98 % -- --   04/19/24 2133 98.4 °F (36.9 °C)  65 18 180/84 Abnormal  -- 98 % None (Room air) Sitting      Pertinent Labs/Diagnostic Results:       Results from last 7 days   Lab Units 04/19/24  0534 04/18/24  2310 04/18/24 1918   WBC Thousand/uL 8.83  --  9.18   HEMOGLOBIN g/dL 12.7  --  13.9   HEMATOCRIT % 38.1  --  41.6   PLATELETS Thousands/uL 258 255 303   TOTAL NEUT ABS Thousands/µL  --   --  6.58         Results from last 7 days   Lab Units 04/19/24  0534 04/18/24 1918   SODIUM mmol/L 140 142   POTASSIUM mmol/L 3.6 3.8   CHLORIDE mmol/L 110* 105   CO2 mmol/L 20* 27   ANION GAP mmol/L 10 10   BUN mg/dL 29* 33*   CREATININE mg/dL 1.27 1.44*   EGFR ml/min/1.73sq m 41 35   CALCIUM mg/dL 8.9 9.5     Results from last 7 days   Lab Units 04/18/24 1918   AST U/L 10*   ALT U/L 8   ALK PHOS U/L 105*   TOTAL PROTEIN g/dL 7.4   ALBUMIN g/dL 4.1   TOTAL BILIRUBIN mg/dL 0.35     Results from last 7 days   Lab Units 04/20/24  0605 04/19/24  2048 04/19/24  1603 04/19/24  1049 04/19/24  0721   POC GLUCOSE mg/dl 114 113 104 179* 108     Results from last 7 days   Lab Units 04/19/24  0534 04/18/24 1918   GLUCOSE RANDOM mg/dL 121 148*       Results from last 7 days   Lab Units 04/18/24 1918   PROTIME seconds 12.9   INR  0.92   PTT seconds 34         Results from last 7 days   Lab Units 04/18/24  1918   PROCALCITONIN ng/ml 0.07     Results from last 7 days   Lab Units 04/18/24 1918   LACTIC ACID mmol/L 1.4         Results  from last 7 days   Lab Units 04/18/24  1918   BLOOD CULTURE  No Growth at 24 hrs.  No Growth at 24 hrs.             Results from last 7 days   Lab Units 04/19/24  0534   VANCOMYCIN RM ug/mL 15.0       Medications:   Scheduled Medications:  allopurinol, 50 mg, Oral, Every Other Day  amLODIPine, 5 mg, Oral, Daily  aspirin, 81 mg, Oral, Daily  cefazolin, 2,000 mg, Intravenous, Q12H  cinacalcet, 30 mg, Oral, Every Other Day  cloNIDine, 1 patch, Transdermal, Weekly  collagenase, , Topical, Daily  escitalopram, 20 mg, Oral, Daily  heparin (porcine), 5,000 Units, Subcutaneous, Q8H JAVIER  insulin lispro, 1-5 Units, Subcutaneous, TID AC  labetalol, 300 mg, Oral, Q12H JAVIER  losartan, 100 mg, Oral, Daily  metroNIDAZOLE, 500 mg, Oral, Q8H JAVIER  pravastatin, 80 mg, Oral, Daily With Dinner  ticagrelor, 90 mg, Oral, Q12H JAVIER  traZODone, 50 mg, Oral, HS      Continuous IV Infusions: None     PRN Meds:  acetaminophen, 650 mg, Oral, Q6H PRN        Discharge Plan: TBD    Network Utilization Review Department  ATTENTION: Please call with any questions or concerns to [phone] and carefully listen to the prompts so that you are directed to the right person. All voicemails are confidential.   For Discharge needs, contact Care Management DC Support Team at [phone] opt. 2  Send all requests for admission clinical reviews, approved or denied determinations and any other requests to dedicated fax number below belonging to the [facility] where the patient is receiving treatment. List of dedicated fax numbers for the Facilities:  FACILITY NAME UR FAX NUMBER   ADMISSION DENIALS (Administrative/Medical Necessity) [phone]   DISCHARGE SUPPORT TEAM (NETWORK) [phone]   PARENT CHILD HEALTH (Maternity/NICU/Pediatrics) [phone]   [facility] [phone]   [facility] [phone]   [facility] [phone]   [facility] [phone]    [facility] [phone]   [facility] [phone]   [facility] [phone]   [facility] [phone]   [facility] [phone]   [facility] [phone]   [facility] [phone]   [facility] [phone]

## 2024-04-20 NOTE — CASE MANAGEMENT
Case Management Discharge Planning Note    Patient name Anamaria Parnell  Location /-01 MRN [id number]  : 1945 Date 2024       Current Admission Date: 2024  Current Admission Diagnosis:Cellulitis   Patient Active Problem List    Diagnosis Date Noted    Diabetic ulcer of toe of right foot associated with diabetes mellitus due to underlying condition, limited to breakdown of skin (HCC) 2024    Cellulitis 2024    Stage 3 chronic kidney disease (HCC) 2024    Carotid stenosis, asymptomatic, right 10/27/2023    History of left common carotid artery stent placement 10/27/2023    Complex renal cyst 10/18/2023    Encounter for follow-up surveillance of urothelial carcinoma of lower urinary tract 10/18/2023    Encounter to discuss test results 10/17/2023    Smoking 2023    Renal cyst 2023    Primary hypertension 2023    Acute CVA (cerebrovascular accident) (HCC) 2023    Dysarthria 08/15/2023    Stage 3b chronic kidney disease (HCC)     Hypertensive urgency 2023    Aortoiliac occlusive disease (HCC) 10/11/2022    History of gastrointestinal stromal tumor (GIST) 2022    Secondary hyperparathyroidism of renal origin (HCC) 2022    Gastrointestinal stromal tumor (GIST) (HCC) 2022    Type 2 diabetes mellitus, without long-term current use of insulin (HCC) 2021    Type 2 diabetes mellitus with diabetic peripheral angiopathy without gangrene (HCC) 2021    Depression, recurrent (HCC) 2021    Stage 4 chronic kidney disease (HCC) 2020    Hypertensive kidney disease with stage 4 chronic kidney disease (HCC) 2020    Cervical radiculopathy 2020    Malignant neoplasm of overlapping sites of bladder (HCC) 2020    Stenosis of left anterior descending artery Mid LAD 50-60% stenosis 2020    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 01/14/2020    Mesenteric artery stenosis ([facility]) 01/14/2020    Positive cardiac stress test  small, mildly severe, partially reversible myocardial perfusion defect of anterior and inferior wall  11/12/2019    Abnormal CT of the chest suspicious for an infectious or inflammatory bronchiolitis. 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[facility]) 11/05/2019    Proteinuria 07/01/2019    Aortoiliac stenosis, left ([facility]) 02/25/2019    Spondylosis of lumbar region without myelopathy or radiculopathy 01/29/2019    Lumbosacral spondylosis without myelopathy 01/29/2019    Statin intolerance 01/22/2019    Lumbar disc herniation 01/22/2019    Herniated lumbar intervertebral disc 01/22/2019    Chronic GERD 12/04/2017    Acute renal failure superimposed on stage 3 chronic kidney disease ([facility]) 12/04/2017    Claudication in peripheral vascular disease ([facility]) 12/04/2017    Gout 12/04/2017    Hx-TIA (transient ischemic attack) 12/04/2017    Hyperlipidemia associated with type 2 diabetes mellitus  ([facility]) 12/04/2017    Hypertension associated with diabetes  ([facility]) 12/04/2017    Osteoarthritis 12/04/2017    Right renal artery stenosis ([facility]) 12/04/2017    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    Hyperlipidemia, unspecified 12/04/2017    Vitamin D deficiency 09/08/2016    Basilar artery stenosis 06/22/2016    Type 2 diabetes mellitus with diabetic nephropathy ([facility]) 12/19/2015    Hypercholesteremia 12/19/2015    Hypertension 12/19/2015    Polyneuropathy 12/19/2015    CVA (cerebral vascular accident) ([facility]) 11/09/2015      LOS  (days): 1  Geometric Mean LOS (GMLOS) (days): 3.2  Days to GMLOS:2     OBJECTIVE:  Risk of Unplanned Readmission Score: 18.31         Current admission status: Inpatient   Preferred Pharmacy:   [id number] [place] - [address]  [address]  [place] [address]  Phone: [phone] Fax: [phone]    Primary Care Provider: Ritchie Lawton MD    Primary Insurance: [employer]  Secondary Insurance:     DISCHARGE DETAILS:    Discharge planning discussed with:: patient and patient's son at bedside  Freedom of Choice: Yes  Comments - Freedom of Choice: CM maintained freedom of choice as it pertains to discharge planning. Patient agreeable to HHC. SLIM advised patient could use HHC for wound care. CM provided patient with printed patient choice list from [facility] referral for HHC. Patient selected [facility].  CM contacted family/caregiver?: Yes  Were Treatment Team discharge recommendations reviewed with patient/caregiver?: Yes  Did patient/caregiver verbalize understanding of patient care needs?: Yes  Were patient/caregiver advised of the risks associated with not following Treatment Team discharge recommendations?: Yes    Contacts  Patient Contacts: Austin Parnell  Relationship to Patient:: Family  Contact Method: In Person  Reason/Outcome: Continuity of Care, Discharge Planning    Requested Home Health Care         Is the patient interested in HHC at discharge?: Yes  Home Health Discipline requested:: Nursing, Physical Therapy, Occupational Therapy  Home Health Agency Name:: [facility]  HHA External Referral Reason (only applicable if external HHA name selected): Services not provided in network or near patient location  Home Health Follow-Up Provider:: PCP  Home Health Services Needed:: Wound/Ostomy Care, Evaluate Functional Status and Safety, Gait/ADL Training, Strengthening/Theraputic Exercises to Improve Function  Homebound Criteria Met:: Requires the Assistance of  Another Person for Safe Ambulation or to Leave the Home, Uses an Assist Device (i.e. cane, walker, etc)  Supporting Clincal Findings:: Fatigues Easliy in Short Distances, Limited Endurance    DME Referral Provided  Referral made for DME?: No    Other Referral/Resources/Interventions Provided:  Interventions: HHC  Referral Comments: CM sent referral via [facility] for HHC, printed patient choice list and provided patient with options. Reserved [facility] per patient selection and updated follow up providers to reflect the same. CM advised [facility] via [facility] message of patient being discharged today and faxed AVS to their fax at [phone].    Treatment Team Recommendation: Home with Home Health Care  Discharge Destination Plan:: Home with Home Health Care  Transport at Discharge : Family

## 2024-04-20 NOTE — PLAN OF CARE
Problem: Prexisting or High Potential for Compromised Skin Integrity  Goal: Skin integrity is maintained or improved  Description: INTERVENTIONS:  - Identify patients at risk for skin breakdown  - Assess and monitor skin integrity  - Assess and monitor nutrition and hydration status  - Monitor labs   - Assess for incontinence   - Turn and reposition patient  - Assist with mobility/ambulation  - Relieve pressure over bony prominences  - Avoid friction and shearing  - Provide appropriate hygiene as needed including keeping skin clean and dry  - Evaluate need for skin moisturizer/barrier cream  - Collaborate with interdisciplinary team   - Patient/family teaching  - Consider wound care consult   Outcome: Progressing     Problem: INFECTION - ADULT  Goal: Absence or prevention of progression during hospitalization  Description: INTERVENTIONS:  - Assess and monitor for signs and symptoms of infection  - Monitor lab/diagnostic results  - Monitor all insertion sites, i.e. indwelling lines, tubes, and drains  - Monitor endotracheal if appropriate and nasal secretions for changes in amount and color  - [place] appropriate cooling/warming therapies per order  - Administer medications as ordered  - Instruct and encourage patient and family to use good hand hygiene technique  - Identify and instruct in appropriate isolation precautions for identified infection/condition  Outcome: Progressing  Goal: Absence of fever/infection during neutropenic period  Description: INTERVENTIONS:  - Monitor WBC    Outcome: Progressing

## 2024-04-20 NOTE — NURSING NOTE
Discharge order placed, discharge instructions reviewed, answered all questions. IV's removed, no adverse reactions noted. Pt left floor by wheelchair, with Nurse, Son and all belongings.

## 2024-04-21 NOTE — UTILIZATION REVIEW
"      Inés Harrison, ZULEYMA  Registered Nurse  Specialty: Utilization Review     Utilization Review     Addendum     Date of Service: 4/19/2024  6:51 AM     Expand All Collapse All    Initial Clinical Review        OBS order 4/18 2147 converted to IP on 4/19 0952 for continued mngt of cellulitis      Admission: Date/Time/Statement:   Admission Orders (From admission, onward)          Ordered         04/19/24 0952   INPATIENT ADMISSION  Once             04/18/24 2147   Place in Observation  Once                                      INPATIENT ADMISSION       Standing Status:   Standing       Number of Occurrences:   1       Order Specific Question:   Level of Care       Answer:   Med Surg [16]       Order Specific Question:   Estimated length of stay       Answer:   More than 2 Midnights       Order Specific Question:   Certification       Answer:   I certify that inpatient services are medically necessary for this patient for a duration of greater than two midnights. See H&P and MD Progress Notes for additional information about the patient's course of treatment.      ED Arrival Information         Expected   -    Arrival   4/18/2024 17:35    Acuity   Urgent                 Means of arrival   Wheelchair    Escorted by   Family Member    Service   Hospitalist    Admission type   Emergency                 Arrival complaint   Wound Check                     Chief Complaint   Patient presents with    Wound Check       Hx of peripheral artery disease and DMII, reports wound to head of right big toe worsening over the past two weeks with circular black area around size of nickel with green pus in triage. 1+ pedal pulse to foot, reports BG has been \"really good\" last Ha1c was 5.5 and is being taken off regular insulin per patient soon.          Initial Presentation: 78 y.o. female to ED from home w/ PMH of peripheral artery disease, type 2 diabetes, chronic nonhealing wounds of bilateral feet, hypertension who presents with " worsening right foot erythema and swelling. Some discharge at site . Admitted OBS status w/ cellulitis plan for iv ancef , flagyl and vanco , podiatry consulted . CKD CR 1.44 at baseline , monitor . HTN resume home regimen . DM SSI and monitor . Femoral artery stenosis cont plavix and statin .      PE: : R foot wound noted, chronic ulceration. Erythema, edema ascending up foot.       4/19 Podiatry Consult   right hallux wound -Pending LISA/PVR assessment.  If abnormalities, then I would recommend vascular consultation as well.-Continue IV antibiotics. Low suspicion for OM .      4/19 Vascular consult   LEAD 4/18 (preliminary): RLE- severe diffuse disease in fem-pop arteries with high grade vs occlusion in proximal-mid SFA with reconstitution distally. R LISA 0.10/-/-. L LISA 0.35/50/45 . CLTI with cellulitis of the right foot; improving on antibiotics. Nonpalpable pedal and femoral pulses in RLE. +faint DP signal. XR right foot negative of OM. LEAD 4/18 demonstrates proximal-mid SFA occlusion with reconstitution distally. LISA decreased from 0.29 to 0.10. MTP and GTP not audible. Cont asa, brilinta, statin . Abx .                ED Triage Vitals   Temperature Pulse Respirations Blood Pressure SpO2   04/18/24 1747 04/18/24 1747 04/18/24 1747 04/18/24 1747 04/18/24 1747   98.5 °F (36.9 °C) 67 18 (!) 187/79 97 %       Temp Source Heart Rate Source Patient Position - Orthostatic VS BP Location FiO2 (%)   04/18/24 1747 04/18/24 1747 04/18/24 1747 04/18/24 1747 --   Temporal Monitor Sitting Left arm         Pain Score           04/19/24 0012           No Pain                  Wt Readings from Last 1 Encounters:   04/19/24 57.9 kg (127 lb 11.2 oz)      Additional Vital Signs:   04/19/24 0012 98.7 °F (37.1 °C) 76 16 158/77 104 99 % None (Room air) Lying   04/18/24 2255 -- 78 -- 182/71 Abnormal  -- 98 % -- --   04/18/24 2253 -- 75 18 182/71 Abnormal  102 98 % None (Room air) Lying   04/18/24 2104 -- 68 18 203/81 Abnormal  -- 98  % None (Room air) Lying      Pertinent Labs/Diagnostic Test Results:   XR foot 3+ views RIGHT   Final Result by Harpal Manzanares MD (04/19 0822)       No radiographic evidence of osteomyelitis.       Workstation performed: [id number]           VAS ARTERIAL DUPLEX-LOWER LIMB UNILATERAL    (Results Pending)                 Results from last 7 days   Lab Units 04/19/24  0534 04/18/24  2310 04/18/24 1918   WBC Thousand/uL 8.83  --  9.18   HEMOGLOBIN g/dL 12.7  --  13.9   HEMATOCRIT % 38.1  --  41.6   PLATELETS Thousands/uL 258 255 303   TOTAL NEUT ABS Thousands/µL  --   --  6.58                Results from last 7 days   Lab Units 04/19/24  0534 04/18/24 1918   SODIUM mmol/L 140 142   POTASSIUM mmol/L 3.6 3.8   CHLORIDE mmol/L 110* 105   CO2 mmol/L 20* 27   ANION GAP mmol/L 10 10   BUN mg/dL 29* 33*   CREATININE mg/dL 1.27 1.44*   EGFR ml/min/1.73sq m 41 35   CALCIUM mg/dL 8.9 9.5           Results from last 7 days   Lab Units 04/18/24 1918   AST U/L 10*   ALT U/L 8   ALK PHOS U/L 105*   TOTAL PROTEIN g/dL 7.4   ALBUMIN g/dL 4.1   TOTAL BILIRUBIN mg/dL 0.35           Results from last 7 days   Lab Units 04/19/24  0721   POC GLUCOSE mg/dl 108            Results from last 7 days   Lab Units 04/19/24  0534 04/18/24 1918   GLUCOSE RANDOM mg/dL 121 148*            Results from last 7 days   Lab Units 04/18/24 1918   PROTIME seconds 12.9   INR   0.92   PTT seconds 34               Results from last 7 days   Lab Units 04/18/24 1918   PROCALCITONIN ng/ml 0.07           Results from last 7 days   Lab Units 04/18/24 1918   LACTIC ACID mmol/L 1.4           Results from last 7 days   Lab Units 04/18/24 1918   BLOOD CULTURE   Received in Microbiology Lab. Culture in Progress.  Received in Microbiology Lab. Culture in Progress.               Results from last 7 days   Lab Units 04/19/24  0534   VANCOMYCIN RM ug/mL 15.0         ED Treatment:   Medication Administration from 04/18/2024 1735 to 04/18/2024 1568            Date/Time Order Dose Route Action       04/18/2024 2032 EDT vancomycin (VANCOCIN) 1500 mg in sodium chloride 0.9% 250 mL IVPB 1,500 mg Intravenous New Bag       04/18/2024 1924 EDT cefepime (MAXIPIME) 2 g/50 mL dextrose IVPB 2,000 mg Intravenous New Bag       04/18/2024 1923 EDT sodium chloride 0.9 % bolus 1,000 mL 1,000 mL Intravenous New Bag       04/18/2024 2212 EDT ceFAZolin (ANCEF) IVPB (premix in dextrose) 2,000 mg 50 mL 2,000 mg Intravenous New Bag       04/18/2024 2209 EDT metroNIDAZOLE (FLAGYL) tablet 500 mg 500 mg Oral Given       04/18/2024 2256 EDT amLODIPine (NORVASC) tablet 5 mg 5 mg Oral Given       04/18/2024 2255 EDT labetalol (NORMODYNE) tablet 300 mg 300 mg Oral Given       04/18/2024 2256 EDT ticagrelor (BRILINTA) tablet 90 mg 90 mg Oral Given       04/18/2024 2256 EDT traZODone (DESYREL) tablet 50 mg 50 mg Oral Given             Medical History        Past Medical History:   Diagnosis Date    Aphasia as late effect of cerebrovascular accident (CVA) 08/16/2023    Arthritis      Chronic kidney disease      Diabetes mellitus ([facility])      Embolism and 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 ([facility]) 01/20/2021    Endometriosis      High cholesterol      History of left common carotid artery stent placement 10/27/2023    Hypertension      Kidney disease, chronic, stage III (moderate, EGFR 30-59 ml/min) ([facility])      Lumbar disc herniation      Neuropathy      Obesity (BMI 30-39.9) 12/04/2017    Obesity, morbid ([facility]) 01/20/2021    Peripheral vascular disease ([facility])      Pneumonia      Shoulder injury       left    Spinal stenosis      Stroke ([facility]) 2015     Memory loss         Present on Admission:   Hypertensive urgency   Diabetic ulcer of toe of right foot associated with diabetes mellitus due to underlying condition, limited to breakdown of skin ([facility])   Cellulitis   Type 2 diabetes mellitus, without long-term current use of insulin ([facility])   Femoral artery stenosis, right ([facility]) 50-75% stenosis in the  common femoral artery.        Admitting Diagnosis: Cellulitis [L03.90]  Cellulitis of right foot [L03.115]  Visit for wound check [Z51.89]  Diabetic ulcer of toe of right foot associated with diabetes mellitus due to underlying condition, limited to breakdown of skin (HCC) [E08.621, L97.511]  Age/Sex: 78 y.o. female  Admission Orders:  Scheduled Medications:  allopurinol, 50 mg, Oral, Every Other Day  amLODIPine, 5 mg, Oral, Daily  aspirin, 81 mg, Oral, Daily  cefazolin, 2,000 mg, Intravenous, Q12H  cinacalcet, 30 mg, Oral, Every Other Day  cloNIDine, 1 patch, Transdermal, Weekly  escitalopram, 20 mg, Oral, Daily  heparin (porcine), 5,000 Units, Subcutaneous, Q8H JAVIER  insulin lispro, 1-5 Units, Subcutaneous, TID AC  labetalol, 300 mg, Oral, Q12H JAVIER  losartan, 100 mg, Oral, Daily  metroNIDAZOLE, 500 mg, Oral, Q8H JAVIER  pravastatin, 80 mg, Oral, Daily With Dinner  ticagrelor, 90 mg, Oral, Q12H JAVIER  traZODone, 50 mg, Oral, HS  vancomycin, 500 mg, Intravenous, Q24H        Continuous IV Infusions:  PRN Meds:  acetaminophen, 650 mg, Oral, Q6H PRN     Fingerstick ac and hs   PT OT eval   Up and OOB         IP CONSULT TO PHARMACY  IP CONSULT TO PODIATRY  IP CONSULT TO VASCULAR SURGERY     Network Utilization Review Department  ATTENTION: Please call with any questions or concerns to [phone] and carefully listen to the prompts so that you are directed to the right person. All voicemails are confidential.   For Discharge needs, contact Care Management DC Support Team at [phone] opt. 2  Send all requests for admission clinical reviews, approved or denied determinations and any other requests to dedicated fax number below belonging to the campus where the patient is receiving treatment. List of dedicated fax numbers for the Facilities:  FACILITY NAME UR FAX NUMBER   ADMISSION DENIALS (Administrative/Medical Necessity) [phone]   DISCHARGE SUPPORT TEAM (NETWORK) [phone]   [facility]  (Maternity/NICU/Pediatrics) [phone]   [facility] [phone]   [facility] [phone]   [facility] [phone]   [facility] [phone]   [facility] [phone]   [facility] [phone]   [facility] [phone]   [facility] [phone]   [facility] [phone]   [facility] [phone]   [facility] [phone]   [facility] [phone]                  Revision History

## 2024-04-22 ENCOUNTER — TRANSITIONAL CARE MANAGEMENT (OUTPATIENT)
Dept: INTERNAL MEDICINE CLINIC | Facility: CLINIC | Age: 79
End: 2024-04-22

## 2024-04-22 NOTE — UTILIZATION REVIEW
NOTIFICATION OF ADMISSION DISCHARGE   This is a Notification of Discharge from [facility]. Please be advised that this patient has been discharge from our facility. Below you will find the admission and discharge date and time including the patient’s disposition.   UTILIZATION REVIEW CONTACT:  Hannah Busch  Utilization   Network Utilization Review Department  Phone: [phone] x carefully listen to the prompts. All voicemails are confidential.  Email: [email or web address]     ADMISSION INFORMATION  PRESENTATION DATE: 4/18/2024  6:46 PM  OBERVATION ADMISSION DATE: 4/18/24  INPATIENT ADMISSION DATE: 4/19/24  9:52 AM   DISCHARGE DATE: 4/20/2024  3:03 PM   DISPOSITION:Home with Home Health Care    Network Utilization Review Department  ATTENTION: Please call with any questions or concerns to [phone] and carefully listen to the prompts so that you are directed to the right person. All voicemails are confidential.   For Discharge needs, contact Care Management DC Support Team at [phone] opt. 2  Send all requests for admission clinical reviews, approved or denied determinations and any other requests to dedicated fax number below belonging to the campus where the patient is receiving treatment. List of dedicated fax numbers for the Facilities:  FACILITY NAME UR FAX NUMBER   ADMISSION DENIALS (Administrative/Medical Necessity) [phone]   DISCHARGE SUPPORT TEAM ([facility]) [phone]   PARENT CHILD HEALTH (Maternity/NICU/Pediatrics) [phone]   [facility] [phone]   [facility] [phone]   [facility] [phone]   [facility] [phone]   [facility] [phone]   [facility] [phone]   [facility] [phone]   [facility]  [facility] [phone]   [facility] [phone]   [facility] [phone]   [facility] [phone]   [facility] [phone]

## 2024-04-23 LAB
BACTERIA BLD CULT: NORMAL
BACTERIA BLD CULT: NORMAL

## 2024-04-29 ENCOUNTER — OFFICE VISIT (OUTPATIENT)
Dept: INTERNAL MEDICINE CLINIC | Facility: CLINIC | Age: 79
End: 2024-04-29
Payer: COMMERCIAL

## 2024-04-29 VITALS
BODY MASS INDEX: 24.94 KG/M2 | WEIGHT: 127 LBS | OXYGEN SATURATION: 99 % | SYSTOLIC BLOOD PRESSURE: 120 MMHG | HEART RATE: 81 BPM | RESPIRATION RATE: 16 BRPM | TEMPERATURE: 98 F | DIASTOLIC BLOOD PRESSURE: 70 MMHG | HEIGHT: 60 IN

## 2024-04-29 DIAGNOSIS — E11.51 TYPE 2 DIABETES MELLITUS WITH DIABETIC PERIPHERAL ANGIOPATHY WITHOUT GANGRENE, WITHOUT LONG-TERM CURRENT USE OF INSULIN (HCC): ICD-10-CM

## 2024-04-29 DIAGNOSIS — E08.621 DIABETIC ULCER OF TOE OF RIGHT FOOT ASSOCIATED WITH DIABETES MELLITUS DUE TO UNDERLYING CONDITION, LIMITED TO BREAKDOWN OF SKIN (HCC): Primary | ICD-10-CM

## 2024-04-29 DIAGNOSIS — L97.511 DIABETIC ULCER OF TOE OF RIGHT FOOT ASSOCIATED WITH DIABETES MELLITUS DUE TO UNDERLYING CONDITION, LIMITED TO BREAKDOWN OF SKIN (HCC): Primary | ICD-10-CM

## 2024-04-29 DIAGNOSIS — I70.201 FEMORAL ARTERY STENOSIS, RIGHT (HCC): ICD-10-CM

## 2024-04-29 DIAGNOSIS — I63.9 CEREBROVASCULAR ACCIDENT (CVA), UNSPECIFIED MECHANISM (HCC): ICD-10-CM

## 2024-04-29 PROBLEM — N17.9 ACUTE RENAL FAILURE SUPERIMPOSED ON STAGE 3 CHRONIC KIDNEY DISEASE (HCC): Status: RESOLVED | Noted: 2017-12-04 | Resolved: 2024-04-29

## 2024-04-29 PROBLEM — N18.30 ACUTE RENAL FAILURE SUPERIMPOSED ON STAGE 3 CHRONIC KIDNEY DISEASE (HCC): Status: RESOLVED | Noted: 2017-12-04 | Resolved: 2024-04-29

## 2024-04-29 PROBLEM — I16.0 HYPERTENSIVE URGENCY: Status: RESOLVED | Noted: 2023-07-27 | Resolved: 2024-04-29

## 2024-04-29 PROBLEM — R80.9 PROTEINURIA: Status: RESOLVED | Noted: 2019-07-01 | Resolved: 2024-04-29

## 2024-04-29 PROCEDURE — 99496 TRANSJ CARE MGMT HIGH F2F 7D: CPT | Performed by: INTERNAL MEDICINE

## 2024-04-29 PROCEDURE — 1111F DSCHRG MED/CURRENT MED MERGE: CPT | Performed by: INTERNAL MEDICINE

## 2024-04-29 NOTE — PROGRESS NOTES
Assessment & Plan     1. Diabetic ulcer of toe of right foot associated with diabetes mellitus due to underlying condition, limited to breakdown of skin (HCC)    2. Femoral artery stenosis, right (HCC) 50-75% stenosis in the common femoral artery.    3. Type 2 diabetes mellitus with diabetic peripheral angiopathy without gangrene, without long-term current use of insulin (HCC)    4. Cerebrovascular accident (CVA), unspecified mechanism (HCC)    5. Right renal artery stenosis (HCC)        Depression Screening and Follow-up Plan: Clincally patient does not have depression. No treatment is required.       Subjective     Transitional Care Management Review:   Anamaria Parnell is a 78 y.o. female here for TCM follow up.     During the TCM phone call patient stated:  TCM Call       Date and time call was made  4/22/2024  8:13 AM    Hospital care reviewed  Records reviewed    Patient was hospitialized at  [facility]    Date of Admission  04/18/24    Date of discharge  04/20/24    Diagnosis  Cellulitis    Disposition  Home    Were the patients medications reviewed and updated  Yes    Current Symptoms  None          TCM Call       Post hospital issues  None    Should patient be enrolled in anticoag monitoring?  No    Scheduled for follow up?  Yes    Did you obtain your prescribed medications  Yes    Do you need help managing your prescriptions or medications  No    Is transportation to your appointment needed  No    I have advised the patient to call PCP with any new or worsening symptoms  Coco Dawson, Practice Coordinator    Living Arrangements  Family members    Support System  None    Are you recieving any outpatient services  No    Are you recieving home care services  No    Are you using any community resources  No    Current waiver services  No    Have you fallen in the last 12 months  No    Interperter language line needed  No    Counseling  Patient          Here for TCM visit; we reviewed  recent  hospitalization, dc summary and dc instructions.    Patient was sent to the ER for right foot toe wound, she was hospitalized cellulitis. x-ray negative for osteomyelitis.  Patient did well with antibiotic course. She was seen by podiatry and had bedside debridement of right hallux distal wound. Patient has significant peripheral artery disease especially on the right lower extremity however currently no signs of pain or claudication at rest noted, no threatened tissue loss or acute signs of ischemia noted on exam. She was discharged on Keflex for 3 more days to complete total 7-day course. Recommend close outpatient follow-up with primary care provider and podiatry as well as vascular surgery.             Review of Systems   Constitutional:  Negative for chills and fever.   HENT:  Negative for ear pain and sore throat.    Eyes:  Negative for pain and visual disturbance.   Respiratory:  Negative for cough and shortness of breath.    Cardiovascular:  Negative for chest pain and palpitations.   Gastrointestinal:  Negative for abdominal pain and vomiting.   Genitourinary:  Negative for dysuria and hematuria.   Musculoskeletal:  Positive for arthralgias and gait problem. Negative for back pain.   Skin:  Negative for color change and rash.   Neurological:  Positive for speech difficulty. Negative for seizures and syncope.   All other systems reviewed and are negative.      Objective     /70 (BP Location: Left arm, Patient Position: Sitting, Cuff Size: Standard)   Pulse 81   Temp 98 °F (36.7 °C) (Tympanic)   Resp 16   Ht 5' (1.524 m)   Wt 57.6 kg (127 lb)   LMP  (LMP Unknown)   SpO2 99%   BMI 24.80 kg/m²      Physical Exam  Vitals and nursing note reviewed.   Constitutional:       General: She is not in acute distress.     Appearance: She is well-developed.   HENT:      Head: Normocephalic and atraumatic.   Eyes:      Conjunctiva/sclera: Conjunctivae normal.   Cardiovascular:      Rate and Rhythm: Normal  rate and regular rhythm.      Heart sounds: Normal heart sounds. No murmur heard.  Pulmonary:      Effort: Pulmonary effort is normal. No respiratory distress.      Breath sounds: Normal breath sounds.   Abdominal:      Tenderness: There is no abdominal tenderness.   Musculoskeletal:         General: Signs of injury (right great toe wound, dressed with kurlex) present. No swelling.      Cervical back: Neck supple.   Skin:     General: Skin is warm and dry.      Capillary Refill: Capillary refill takes less than 2 seconds.   Neurological:      Mental Status: She is alert and oriented to person, place, and time.      Cranial Nerves: Dysarthria present.      Gait: Gait abnormal.   Psychiatric:         Mood and Affect: Mood normal.       Medications have been reviewed by provider in current encounter    Ritchie Lawton MD

## 2024-05-24 ENCOUNTER — ESTABLISHED COMPREHENSIVE EXAM (OUTPATIENT)
Dept: URBAN - METROPOLITAN AREA CLINIC 6 | Facility: CLINIC | Age: 79
End: 2024-05-24

## 2024-05-24 DIAGNOSIS — Z79.4: ICD-10-CM

## 2024-05-24 DIAGNOSIS — E11.3213: ICD-10-CM

## 2024-05-24 DIAGNOSIS — H43.813: ICD-10-CM

## 2024-05-24 PROCEDURE — 92014 COMPRE OPH EXAM EST PT 1/>: CPT

## 2024-05-24 PROCEDURE — 92202 OPSCPY EXTND ON/MAC DRAW: CPT

## 2024-05-24 PROCEDURE — 92134 CPTRZ OPH DX IMG PST SGM RTA: CPT

## 2024-05-24 ASSESSMENT — VISUAL ACUITY
OD_CC: 20/30-
OS_CC: 20/100

## 2024-05-24 ASSESSMENT — TONOMETRY
OD_IOP_MMHG: 12
OS_IOP_MMHG: 10

## 2024-06-03 ENCOUNTER — HOSPITAL ENCOUNTER (OUTPATIENT)
Dept: NON INVASIVE DIAGNOSTICS | Facility: CLINIC | Age: 79
Discharge: HOME | End: 2024-06-03
Payer: COMMERCIAL

## 2024-06-03 DIAGNOSIS — I65.22 SYMPTOMATIC CAROTID ARTERY STENOSIS, LEFT: ICD-10-CM

## 2024-06-03 PROBLEM — I70.235 ATHEROSCLEROSIS OF NATIVE ARTERIES OF RIGHT LEG WITH ULCERATION OF OTHER PART OF FOOT (HCC): Status: ACTIVE | Noted: 2020-03-03

## 2024-06-03 PROCEDURE — 93880 EXTRACRANIAL BILAT STUDY: CPT | Performed by: SURGERY

## 2024-06-03 PROCEDURE — 93880 EXTRACRANIAL BILAT STUDY: CPT

## 2024-06-04 ENCOUNTER — OFFICE VISIT (OUTPATIENT)
Dept: VASCULAR SURGERY | Facility: CLINIC | Age: 79
End: 2024-06-04
Payer: COMMERCIAL

## 2024-06-04 VITALS
SYSTOLIC BLOOD PRESSURE: 142 MMHG | HEIGHT: 60 IN | BODY MASS INDEX: 24.8 KG/M2 | DIASTOLIC BLOOD PRESSURE: 80 MMHG | HEART RATE: 66 BPM

## 2024-06-04 DIAGNOSIS — K55.1 MESENTERIC ARTERY STENOSIS (HCC): ICD-10-CM

## 2024-06-04 DIAGNOSIS — I70.1 RIGHT RENAL ARTERY STENOSIS (HCC): ICD-10-CM

## 2024-06-04 DIAGNOSIS — N18.32 STAGE 3B CHRONIC KIDNEY DISEASE (HCC): Primary | ICD-10-CM

## 2024-06-04 DIAGNOSIS — I70.201 FEMORAL ARTERY STENOSIS, RIGHT (HCC): ICD-10-CM

## 2024-06-04 DIAGNOSIS — I70.235 ATHEROSCLEROSIS OF NATIVE ARTERIES OF RIGHT LEG WITH ULCERATION OF OTHER PART OF FOOT (HCC): ICD-10-CM

## 2024-06-04 DIAGNOSIS — I74.09 AORTOILIAC OCCLUSIVE DISEASE (HCC): ICD-10-CM

## 2024-06-04 DIAGNOSIS — E11.51 TYPE 2 DIABETES MELLITUS WITH DIABETIC PERIPHERAL ANGIOPATHY WITHOUT GANGRENE, WITHOUT LONG-TERM CURRENT USE OF INSULIN (HCC): ICD-10-CM

## 2024-06-04 DIAGNOSIS — I65.21 CAROTID STENOSIS, ASYMPTOMATIC, RIGHT: ICD-10-CM

## 2024-06-04 DIAGNOSIS — I65.22 SYMPTOMATIC CAROTID ARTERY STENOSIS, LEFT: ICD-10-CM

## 2024-06-04 PROBLEM — I77.4 MEDIAN ARCUATE LIGAMENT SYNDROME (HCC): Status: RESOLVED | Noted: 2019-11-05 | Resolved: 2024-06-04

## 2024-06-04 PROCEDURE — 99215 OFFICE O/P EST HI 40 MIN: CPT | Performed by: SURGERY

## 2024-06-04 NOTE — ASSESSMENT & PLAN NOTE
Lab Results   Component Value Date    EGFR 41 04/19/2024    EGFR 35 04/18/2024    EGFR 30 02/15/2024    CREATININE 1.27 04/19/2024    CREATININE 1.44 (H) 04/18/2024    CREATININE 1.61 (H) 02/15/2024   Improvement in kidney function over time.  She gets significant decline in kidney when she gets contrast dye.

## 2024-06-04 NOTE — PATIENT INSTRUCTIONS
Daily walking while holding chair in front of you, take steps with your leg and walk in spot.  Do that a few times a day to keep circulation better.    Will give referral to local podiatrist Dr. Cortez so that you can follow up regularly.

## 2024-06-04 NOTE — ASSESSMENT & PLAN NOTE
Lab Results   Component Value Date    HGBA1C 5.5 04/08/2024       Improved A1c   Improved renal function.  Continue current plan.    Daily walking while holding chair in front of you, take steps with your leg and walk in spot.  Do that a few times a day to keep circulation better and help with diabetes control.    Will give referral to local podiatrist Dr. Cortez so that you can follow up regularly.

## 2024-06-04 NOTE — ASSESSMENT & PLAN NOTE
S/p left TCAR for symptomatic stenosis.  On long term brillanta.    Repeat duplex was reviewed, stent is widely patent.  No new events. No cranial nerve issues.    [facility] Long-Term Follow-Up    Myocardial Infarction - No    Neurological Event - No    Reperfusion Symptoms - No    Modified Frederick Score - 0 - No symptoms         Functional Status - Self-Care

## 2024-06-04 NOTE — PROGRESS NOTES
Ambulatory Visit  Name: Anamaria Parnell      : 1945      MRN: [id number]  Encounter Provider: Nicole Gallo MD  Encounter Date: 2024   Encounter department: [facility]    Assessment & Plan   1. Stage 3b chronic kidney disease (HCC)  Assessment & Plan:  Lab Results   Component Value Date    EGFR 41 2024    EGFR 35 2024    EGFR 30 02/15/2024    CREATININE 1.27 2024    CREATININE 1.44 (H) 2024    CREATININE 1.61 (H) 02/15/2024   Improvement in kidney function over time.  She gets significant decline in kidney when she gets contrast dye.    2. Symptomatic carotid artery stenosis, left  Assessment & Plan:  S/p left TCAR for symptomatic stenosis.  On long term brillanta.    Repeat duplex was reviewed, stent is widely patent.  No new events. No cranial nerve issues.    [facility] Long-Term Follow-Up    Myocardial Infarction - No    Neurological Event - No    Reperfusion Symptoms - No    Modified Flathead Score - 0 - No symptoms         Functional Status - Self-Care      Orders:  -     VAS ARTERIAL DUPLEX- LOWER LIMB BILATERAL; Future; Expected date: 2024  -     VAS carotid complete study; Future; Expected date: 2024  3. Femoral artery stenosis, right ([facility]) 50-75% stenosis in the common femoral artery.  -     VAS ARTERIAL DUPLEX- LOWER LIMB BILATERAL; Future; Expected date: 2024  -     VAS carotid complete study; Future; Expected date: 2024  4. Atherosclerosis of native arteries of right leg with ulceration of other part of foot ([facility])  Assessment & Plan:  Non contrast CT scan from  was reviewed and recent duplex reviewed. Multi level occlusive disease with severe calcified atherosclerotic disease in the right iliac system, right common femoral and proximal SFA and profunda femoral arteries.  There is occlusion of right SFA.  There is very poor perfusion of right foot.    Options are limited and high risk due to extent of disease  process.  CKD has improved and we can consider CTA or other contrast based studies with iv hydration.  Risk of groin wound infection due to diabetes and body habitus with pannus.  Overall limited functional walking status due to multiple underlying chronic health issues.    Dry ulcer over tip of right 2nd toe.  Continue local wound care / foot care.    Rest pain has resolved due to collaterals. Short distance claudication is managed with resting in between activity.  Orders:  -     VAS ARTERIAL DUPLEX- LOWER LIMB BILATERAL; Future; Expected date: 12/03/2024  -     VAS carotid complete study; Future; Expected date: 12/03/2024  -     Ambulatory Referral to Podiatry; Future  5. Aortoiliac occlusive disease (HCC)  -     VAS ARTERIAL DUPLEX- LOWER LIMB BILATERAL; Future; Expected date: 12/03/2024  -     VAS carotid complete study; Future; Expected date: 12/03/2024  6. Type 2 diabetes mellitus with diabetic peripheral angiopathy without gangrene, without long-term current use of insulin (HCC)  Assessment & Plan:    Lab Results   Component Value Date    HGBA1C 5.5 04/08/2024       Improved A1c   Improved renal function.  Continue current plan.    Daily walking while holding chair in front of you, take steps with your leg and walk in spot.  Do that a few times a day to keep circulation better and help with diabetes control.    Will give referral to local podiatrist Dr. oCrtez so that you can follow up regularly.  7. Right renal artery stenosis (HCC)  Assessment & Plan:  BP is stable, kidney function is improving. No intervention recommend.  8. Mesenteric artery stenosis (HCC)  Assessment & Plan:  Asymptomatic.  No abdominal pain.  Less appetite.  9. Carotid stenosis, asymptomatic, right  Assessment & Plan:  Asymptomatic. Will continue with current medical management with statin and brilllanta.      History of Present Illness     Anamaria Parnell is a 78 y.o. female who presents for follow up for right foot wound.  It has  remained stable with no progression. She is doing betadine paint.  She walks very little. She uses a walker to walk to bathroom. She takes her own shower and independent with using bathroom. She does light housework.      Patient presents for ED follow-up on R foot cellulitis. Also to review TEA/ CV.     Review of Systems   Constitutional: Negative.    HENT: Negative.     Eyes: Negative.    Respiratory: Negative.     Cardiovascular: Negative.    Gastrointestinal: Negative.    Endocrine: Negative.    Genitourinary: Negative.    Musculoskeletal:  Positive for gait problem.   Skin:  Positive for wound.   Allergic/Immunologic: Negative.    Hematological: Negative.    Psychiatric/Behavioral: Negative.     I have reviewed the ROS as entered and made changes as necessary.    Medical History Reviewed by provider this encounter:       Objective     /80 (BP Location: Left arm, Patient Position: Sitting, Cuff Size: Standard)   Pulse 66   Ht 5' (1.524 m)   LMP  (LMP Unknown)   BMI 24.80 kg/m²     Physical Exam  Vitals and nursing note reviewed.   Constitutional:       Appearance: Normal appearance.   Cardiovascular:      Rate and Rhythm: Normal rate and regular rhythm.      Comments: Monophasic DP bilateral  Musculoskeletal:      Right lower leg: Edema present.      Left lower leg: Edema present.   Skin:     Comments: Stable dry ulcer over right first toe, unchanged from 6 weeks ago.    No evidence of infection.   Neurological:      General: No focal deficit present.      Mental Status: She is alert.       Administrative Statements   Diagnostic results, Prognosis, Risks and benefits of tx options, Instructions for management, Patient and family education, Risk factor reductions, Reviewing / ordering tests, medicine, procedures  , Obtaining or reviewing history  , and Communicating with other healthcare professionals .

## 2024-06-04 NOTE — ASSESSMENT & PLAN NOTE
Non contrast CT scan from 2022 was reviewed and recent duplex reviewed. Multi level occlusive disease with severe calcified atherosclerotic disease in the right iliac system, right common femoral and proximal SFA and profunda femoral arteries.  There is occlusion of right SFA.  There is very poor perfusion of right foot.    Options are limited and high risk due to extent of disease process.  CKD has improved and we can consider CTA or other contrast based studies with iv hydration.  Risk of groin wound infection due to diabetes and body habitus with pannus.  Overall limited functional walking status due to multiple underlying chronic health issues.    Dry ulcer over tip of right 2nd toe.  Continue local wound care / foot care.    Rest pain has resolved due to collaterals. Short distance claudication is managed with resting in between activity.

## 2024-06-09 DIAGNOSIS — E83.52 HYPERCALCEMIA: ICD-10-CM

## 2024-06-09 DIAGNOSIS — N18.4 STAGE 4 CHRONIC KIDNEY DISEASE (HCC): ICD-10-CM

## 2024-06-09 DIAGNOSIS — N25.81 SECONDARY HYPERPARATHYROIDISM OF RENAL ORIGIN (HCC): ICD-10-CM

## 2024-06-10 RX ORDER — CINACALCET 30 MG/1
30 TABLET, FILM COATED ORAL EVERY OTHER DAY
Qty: 45 TABLET | Refills: 3 | Status: SHIPPED | OUTPATIENT
Start: 2024-06-10 | End: 2025-03-07

## 2024-06-19 ENCOUNTER — TELEPHONE (OUTPATIENT)
Age: 79
End: 2024-06-19

## 2024-06-19 NOTE — TELEPHONE ENCOUNTER
[facility] called stating the physcian order from 5/31/24 needs to be resigned and dated.  Said date was not legible.

## 2024-06-20 ENCOUNTER — TELEPHONE (OUTPATIENT)
Age: 79
End: 2024-06-20

## 2024-06-20 DIAGNOSIS — I63.9 CEREBROVASCULAR ACCIDENT (CVA), UNSPECIFIED MECHANISM (HCC): Primary | ICD-10-CM

## 2024-06-20 NOTE — TELEPHONE ENCOUNTER
Patients son wants to make sure his mom will still be able to get help from [facility]. He was told by them, that she needed a new RX from her PCP in order to get assistance again.     [facility]:  [phone]     [place] [phone]

## 2024-06-26 NOTE — TELEPHONE ENCOUNTER
[facility] called again in regards to PCP form being faxed correctly, Triage nurse warm transferred pt to office for One call resolution, since the issue has been going on since 6/17. Rangel from office stated he will take care of it and get this resolved.

## 2024-07-01 DIAGNOSIS — F33.9 DEPRESSION, RECURRENT (HCC): ICD-10-CM

## 2024-07-01 DIAGNOSIS — I10 PRIMARY HYPERTENSION: ICD-10-CM

## 2024-07-01 RX ORDER — TRAZODONE HYDROCHLORIDE 50 MG/1
50 TABLET ORAL
Qty: 90 TABLET | Refills: 1 | Status: SHIPPED | OUTPATIENT
Start: 2024-07-01

## 2024-07-01 RX ORDER — AMLODIPINE BESYLATE 5 MG/1
5 TABLET ORAL DAILY
Qty: 90 TABLET | Refills: 0 | Status: SHIPPED | OUTPATIENT
Start: 2024-07-01

## 2024-07-05 ENCOUNTER — RA CDI HCC (OUTPATIENT)
Dept: OTHER | Facility: HOSPITAL | Age: 79
End: 2024-07-05

## 2024-07-08 DIAGNOSIS — E79.0 HYPERURICEMIA: ICD-10-CM

## 2024-07-08 RX ORDER — ALLOPURINOL 100 MG/1
TABLET ORAL
Qty: 90 TABLET | Refills: 1 | Status: ON HOLD | OUTPATIENT
Start: 2024-07-08

## 2024-07-09 ENCOUNTER — APPOINTMENT (OUTPATIENT)
Dept: LAB | Facility: HOSPITAL | Age: 79
End: 2024-07-09
Payer: COMMERCIAL

## 2024-07-09 DIAGNOSIS — N18.31 TYPE 2 DIABETES MELLITUS WITH STAGE 3A CHRONIC KIDNEY DISEASE, WITHOUT LONG-TERM CURRENT USE OF INSULIN (HCC): ICD-10-CM

## 2024-07-09 DIAGNOSIS — E11.22 TYPE 2 DIABETES MELLITUS WITH STAGE 3A CHRONIC KIDNEY DISEASE, WITHOUT LONG-TERM CURRENT USE OF INSULIN (HCC): ICD-10-CM

## 2024-07-09 DIAGNOSIS — I63.9 CVA (CEREBRAL VASCULAR ACCIDENT) (HCC): ICD-10-CM

## 2024-07-09 LAB
BACTERIA UR QL AUTO: ABNORMAL /HPF
BASOPHILS # BLD AUTO: 0.06 THOUSANDS/ÂΜL (ref 0–0.1)
BASOPHILS NFR BLD AUTO: 1 % (ref 0–1)
BILIRUB UR QL STRIP: NEGATIVE
CLARITY UR: ABNORMAL
COLOR UR: YELLOW
EOSINOPHIL # BLD AUTO: 0.33 THOUSAND/ÂΜL (ref 0–0.61)
EOSINOPHIL NFR BLD AUTO: 3 % (ref 0–6)
ERYTHROCYTE [DISTWIDTH] IN BLOOD BY AUTOMATED COUNT: 13.5 % (ref 11.6–15.1)
EST. AVERAGE GLUCOSE BLD GHB EST-MCNC: 123 MG/DL
GLUCOSE UR STRIP-MCNC: NEGATIVE MG/DL
HBA1C MFR BLD: 5.9 %
HCT VFR BLD AUTO: 34.5 % (ref 34.8–46.1)
HGB BLD-MCNC: 11.2 G/DL (ref 11.5–15.4)
HGB UR QL STRIP.AUTO: NEGATIVE
IMM GRANULOCYTES # BLD AUTO: 0.14 THOUSAND/UL (ref 0–0.2)
IMM GRANULOCYTES NFR BLD AUTO: 1 % (ref 0–2)
KETONES UR STRIP-MCNC: NEGATIVE MG/DL
LEUKOCYTE ESTERASE UR QL STRIP: ABNORMAL
LYMPHOCYTES # BLD AUTO: 1.15 THOUSANDS/ÂΜL (ref 0.6–4.47)
LYMPHOCYTES NFR BLD AUTO: 12 % (ref 14–44)
MCH RBC QN AUTO: 28.6 PG (ref 26.8–34.3)
MCHC RBC AUTO-ENTMCNC: 32.5 G/DL (ref 31.4–37.4)
MCV RBC AUTO: 88 FL (ref 82–98)
MONOCYTES # BLD AUTO: 0.87 THOUSAND/ÂΜL (ref 0.17–1.22)
MONOCYTES NFR BLD AUTO: 9 % (ref 4–12)
MUCOUS THREADS UR QL AUTO: ABNORMAL
NEUTROPHILS # BLD AUTO: 7.24 THOUSANDS/ÂΜL (ref 1.85–7.62)
NEUTS SEG NFR BLD AUTO: 74 % (ref 43–75)
NITRITE UR QL STRIP: NEGATIVE
NON-SQ EPI CELLS URNS QL MICRO: ABNORMAL /HPF
NRBC BLD AUTO-RTO: 0 /100 WBCS
PH UR STRIP.AUTO: 6 [PH]
PLATELET # BLD AUTO: 352 THOUSANDS/UL (ref 149–390)
PMV BLD AUTO: 9.9 FL (ref 8.9–12.7)
PROT UR STRIP-MCNC: ABNORMAL MG/DL
RBC # BLD AUTO: 3.91 MILLION/UL (ref 3.81–5.12)
RBC #/AREA URNS AUTO: ABNORMAL /HPF
SP GR UR STRIP.AUTO: 1.02 (ref 1–1.03)
TSH SERPL DL<=0.05 MIU/L-ACNC: 4.14 UIU/ML (ref 0.45–4.5)
UROBILINOGEN UR STRIP-ACNC: <2 MG/DL
WBC # BLD AUTO: 9.79 THOUSAND/UL (ref 4.31–10.16)
WBC #/AREA URNS AUTO: ABNORMAL /HPF
WBC CLUMPS # UR AUTO: PRESENT /UL

## 2024-07-09 PROCEDURE — 83036 HEMOGLOBIN GLYCOSYLATED A1C: CPT

## 2024-07-09 PROCEDURE — 84443 ASSAY THYROID STIM HORMONE: CPT

## 2024-07-09 PROCEDURE — 36415 COLL VENOUS BLD VENIPUNCTURE: CPT

## 2024-07-09 PROCEDURE — 85025 COMPLETE CBC W/AUTO DIFF WBC: CPT

## 2024-07-10 DIAGNOSIS — I63.9 ACUTE CVA (CEREBROVASCULAR ACCIDENT) (HCC): ICD-10-CM

## 2024-07-10 RX ORDER — ESCITALOPRAM OXALATE 20 MG/1
20 TABLET ORAL DAILY
Qty: 90 TABLET | Refills: 1 | Status: ON HOLD | OUTPATIENT
Start: 2024-07-10

## 2024-07-15 ENCOUNTER — OFFICE VISIT (OUTPATIENT)
Dept: INTERNAL MEDICINE CLINIC | Facility: CLINIC | Age: 79
End: 2024-07-15
Payer: COMMERCIAL

## 2024-07-15 VITALS
SYSTOLIC BLOOD PRESSURE: 124 MMHG | HEART RATE: 61 BPM | HEIGHT: 60 IN | BODY MASS INDEX: 28.27 KG/M2 | RESPIRATION RATE: 17 BRPM | OXYGEN SATURATION: 97 % | DIASTOLIC BLOOD PRESSURE: 76 MMHG | WEIGHT: 144 LBS

## 2024-07-15 DIAGNOSIS — I70.235 ATHEROSCLEROSIS OF NATIVE ARTERIES OF RIGHT LEG WITH ULCERATION OF OTHER PART OF FOOT (HCC): ICD-10-CM

## 2024-07-15 DIAGNOSIS — R79.83 ABNORMAL FINDINGS OF BLOOD AMINO-ACID LEVEL: ICD-10-CM

## 2024-07-15 DIAGNOSIS — N25.81 SECONDARY HYPERPARATHYROIDISM OF RENAL ORIGIN (HCC): ICD-10-CM

## 2024-07-15 DIAGNOSIS — I70.1 RIGHT RENAL ARTERY STENOSIS (HCC): ICD-10-CM

## 2024-07-15 DIAGNOSIS — K55.1 MESENTERIC ARTERY STENOSIS (HCC): ICD-10-CM

## 2024-07-15 DIAGNOSIS — I10 PRIMARY HYPERTENSION: ICD-10-CM

## 2024-07-15 DIAGNOSIS — I63.9 CEREBROVASCULAR ACCIDENT (CVA), UNSPECIFIED MECHANISM (HCC): ICD-10-CM

## 2024-07-15 DIAGNOSIS — Z00.00 MEDICARE ANNUAL WELLNESS VISIT, SUBSEQUENT: Primary | ICD-10-CM

## 2024-07-15 DIAGNOSIS — I65.21 CAROTID STENOSIS, ASYMPTOMATIC, RIGHT: ICD-10-CM

## 2024-07-15 DIAGNOSIS — E11.51 TYPE 2 DIABETES MELLITUS WITH DIABETIC PERIPHERAL ANGIOPATHY WITHOUT GANGRENE, WITHOUT LONG-TERM CURRENT USE OF INSULIN (HCC): ICD-10-CM

## 2024-07-15 DIAGNOSIS — R47.1 DYSARTHRIA: ICD-10-CM

## 2024-07-15 DIAGNOSIS — I70.201 FEMORAL ARTERY STENOSIS, RIGHT (HCC): ICD-10-CM

## 2024-07-15 DIAGNOSIS — I65.22 SYMPTOMATIC CAROTID ARTERY STENOSIS, LEFT: ICD-10-CM

## 2024-07-15 DIAGNOSIS — E78.2 MIXED HYPERLIPIDEMIA: ICD-10-CM

## 2024-07-15 PROBLEM — L03.90 CELLULITIS: Status: RESOLVED | Noted: 2024-04-18 | Resolved: 2024-07-15

## 2024-07-15 PROBLEM — N18.30 STAGE 3 CHRONIC KIDNEY DISEASE (HCC): Status: RESOLVED | Noted: 2024-04-18 | Resolved: 2024-07-15

## 2024-07-15 PROCEDURE — 99214 OFFICE O/P EST MOD 30 MIN: CPT | Performed by: INTERNAL MEDICINE

## 2024-07-15 PROCEDURE — G0439 PPPS, SUBSEQ VISIT: HCPCS | Performed by: INTERNAL MEDICINE

## 2024-07-15 RX ORDER — LEVOFLOXACIN 500 MG/1
500 TABLET, FILM COATED ORAL EVERY 24 HOURS
Status: ON HOLD | COMMUNITY

## 2024-07-15 RX ORDER — BLOOD SUGAR DIAGNOSTIC
STRIP MISCELLANEOUS
Qty: 300 EACH | Refills: 3 | Status: ON HOLD | OUTPATIENT
Start: 2024-07-15

## 2024-07-15 RX ORDER — BLOOD-GLUCOSE METER
KIT MISCELLANEOUS
Qty: 1 KIT | Refills: 0 | Status: ON HOLD | OUTPATIENT
Start: 2024-07-15

## 2024-07-15 RX ORDER — LANCETS 33 GAUGE
EACH MISCELLANEOUS
Qty: 300 EACH | Refills: 3 | Status: ON HOLD | OUTPATIENT
Start: 2024-07-15

## 2024-07-15 NOTE — PROGRESS NOTES
Ambulatory Visit  Name: Anamaria Parnell      : 1945      MRN: [id number]  Encounter Provider: Ritchie Lawton MD  Encounter Date: 7/15/2024   Encounter department: [facility] INTERNAL MEDICINE [place]    Assessment & Plan     1. Medicare annual wellness visit, subsequent  2. Type 2 diabetes mellitus with diabetic peripheral angiopathy without gangrene, without long-term current use of insulin (Formerly [facility])  Assessment & Plan:    Lab Results   Component Value Date    HGBA1C 5.9 (H) 2024       Improved A1c   Improved renal function.  Continue current plan.    Daily walking while holding chair in front of you, take steps with your leg and walk in spot.  Do that a few times a day to keep circulation better and help with diabetes control.    Will give referral to local podiatrist Dr. Cortze so that you can follow up regularly.  Orders:  -     Albumin / creatinine urine ratio; Future; Expected date: 2024  -     Comprehensive metabolic panel; Future; Expected date: 2024  -     Hemoglobin A1C; Future; Expected date: 2024  -     CBC and differential; Future; Expected date: 2024  -     TSH, 3rd generation with Free T4 reflex; Future; Expected date: 2024  -     Lipid Panel with Direct LDL reflex; Future; Expected date: 2024  -     Blood Glucose Monitoring Suppl (OneTouch Verio Reflect) w/Device KIT; Check blood sugars three times daily. Please substitute with appropriate alternative as covered by patient's insurance. Dx: E11.65  -     glucose blood (OneTouch Verio) test strip; Check blood sugars three times daily. Please substitute with appropriate alternative as covered by patient's insurance. Dx: E11.65  -     OneTouch Delica Lancets 33G MISC; Check blood sugars three times daily. Please substitute with appropriate alternative as covered by patient's insurance. Dx: E11.65  3. Symptomatic carotid artery stenosis, left  Assessment & Plan:  S/p left TCAR for symptomatic  stenosis.  On long term brillanta.         4. Right renal artery stenosis (HCC)  Assessment & Plan:  BP is stable, kidney function is improving. No intervention recommend.  5. Primary hypertension  Assessment & Plan:  Continue amlodipine, clonidine and  labetalol   Losartan on hold  Monitor  6. Mesenteric artery stenosis (HCC)  Assessment & Plan:  Asymptomatic.  No abdominal pain.   7. Femoral artery stenosis, right (HCC) 50-75% stenosis in the common femoral artery.  Assessment & Plan:  Known history, follows with vascular surgery outpatient.      8. Cerebrovascular accident (CVA), unspecified mechanism (HCC)  Assessment & Plan:  Continue aspirin and Brilinta for now    9. Carotid stenosis, asymptomatic, right  Assessment & Plan:  Asymptomatic. Will continue with current medical management with statin and brilllanta.  10. Secondary hyperparathyroidism of renal origin (Formerly [facility])  Assessment & Plan:  Managed by nephrology.  Continue 30 mg of Cinacalcet every other day.  11. Mixed hyperlipidemia  Assessment & Plan:  History of myalgias to statin  Patient on pravastatin and tolerating, continue same   12. Dysarthria  Assessment & Plan:  Patient with worsening dysarthria with recent large stroke.  Potentially just evolution of her previous stroke  Recent CTA done shows, among other lesions, Advanced atherosclerotic change of bilateral cervical carotid arteries. Estimated 85-90% left and 80% right proximal ICA stenosis.       13. Atherosclerosis of native arteries of right leg with ulceration of other part of foot (HCC)  Assessment & Plan:  Dry ulcer over tip of right 2nd toe.  Continue local wound care / foot care.     Rest pain has resolved due to collaterals. Short distance claudication is managed with resting in between activity.  14. Abnormal findings of blood amino-acid level  -     CBC and differential; Future; Expected date: 11/12/2024      Depression Screening and Follow-up Plan: Patient was screened for depression  during today's encounter. They screened negative with a PHQ-9 score of 0.      Preventive health issues were discussed with patient, and age appropriate screening tests were ordered as noted in patient's After Visit Summary. Personalized health advice and appropriate referrals for health education or preventive services given if needed, as noted in patient's After Visit Summary.    History of Present Illness     Patient is here for routine follow up, reviewed chronic medical problems, ordered labs for next visit including CBC CMP TSH A1C LIPID. Reviewed labs for this visit.    H/o type 2 DM, following with endocrine, continue current regimen. A1c 5.6;     H/o L MCA and PCA CV, s/p L TCAR on 9/2023, she continues to take aspirin, Brilinta, and statin, aphasia is about the same; right residual weakness same, declining further PT and OT, speech therapy.     H/o HTN, continue current regimen.      Continue f/u with nephrology, h/o CKD stage 4; continue sensipar,  she is firmly against dialysis, even temporary, she explains today. Reports continued leg pain, foot pain, numbness.     Depression is not controlled; increase lexapro to 20 mg.         Patient Care Team:  Ritchie Lawton MD as PCP - General (Internal Medicine)  Vikas Matthews MD as Neurologist  Robert Mitchell MD (Urology)  Sully Hays MD as Cardiologist (Cardiology)  Toan Tadeo III, MD (Gastroenterology)  Savage Longoria MD (Nephrology)  GRACIELA Palcaio as Nurse Practitioner (Nurse Practitioner)    Review of Systems   Constitutional:  Negative for chills and fever.   HENT:  Negative for ear pain and sore throat.    Eyes:  Negative for pain and visual disturbance.   Respiratory:  Negative for cough and shortness of breath.    Cardiovascular:  Negative for chest pain and palpitations.   Gastrointestinal:  Negative for abdominal pain and vomiting.   Genitourinary:  Negative for dysuria and hematuria.   Musculoskeletal:  Negative  for arthralgias and back pain.   Skin:  Negative for color change and rash.   Neurological:  Negative for seizures and syncope.   All other systems reviewed and are negative.    Medical History Reviewed by provider this encounter:  Tobacco  Allergies  Meds  Problems  Med Hx  Surg Hx  Fam Hx       Annual Wellness Visit Questionnaire   Last Medicare Wellness visit information reviewed, patient interviewed and updates made to the record today.      Health Risk Assessment:   Patient rates overall health as fair. Patient feels that their physical health rating is much worse. Patient is satisfied with their life. Eyesight was rated as same. Hearing was rated as same. Patient feels that their emotional and mental health rating is same. Patients states they are never, rarely angry. Patient states they are never, rarely unusually tired/fatigued. Pain experienced in the last 7 days has been a lot. Patient's pain rating has been 9/10. Patient states that she has experienced no weight loss or gain in last 6 months.     Depression Screening:   PHQ-9 Score: 0      Fall Risk Screening:   In the past year, patient has experienced: no history of falling in past year      Urinary Incontinence Screening:   Patient has not leaked urine accidently in the last six months.     Home Safety:  Patient does not have trouble with stairs inside or outside of their home. Patient has working smoke alarms and has working carbon monoxide detector. Home safety hazards include: none.     Nutrition:   Current diet is Regular.     Medications:   Patient is not currently taking any over-the-counter supplements. Patient is not able to manage medications.     Activities of Daily Living (ADLs)/Instrumental Activities of Daily Living (IADLs):   Walk and transfer into and out of bed and chair?: Yes  Dress and groom yourself?: Yes    Bathe or shower yourself?: Yes    Feed yourself? Yes  Do your laundry/housekeeping?: Yes  Manage your money, pay your  bills and track your expenses?: No  Make your own meals?: No    Do your own shopping?: No    Previous Hospitalizations:   Any hospitalizations or ED visits within the last 12 months?: Yes    How many hospitalizations have you had in the last year?: 1-2    Advance Care Planning:   Living will: Yes    Advanced directive: Yes      Cognitive Screening:   Provider or family/friend/caregiver concerned regarding cognition?: No    PREVENTIVE SCREENINGS      Cardiovascular Screening:    General: History Lipid Disorder and Screening Current      Diabetes Screening:     General: History Diabetes and Screening Current      Colorectal Cancer Screening:     General: Screening Current      Breast Cancer Screening:     General: Screening Not Indicated      Cervical Cancer Screening:    General: Screening Not Indicated      Osteoporosis Screening:    General: Screening Not Indicated      Abdominal Aortic Aneurysm (AAA) Screening:        General: Screening Not Indicated      Lung Cancer Screening:     General: Screening Not Indicated      Hepatitis C Screening:    General: Screening Current    Screening, Brief Intervention, and Referral to Treatment (SBIRT)    Screening  Typical number of drinks in a day: 0  Typical number of drinks in a week: 0  Interpretation: Low risk drinking behavior.    Single Item Drug Screening:  How often have you used an illegal drug (including marijuana) or a prescription medication for non-medical reasons in the past year? never    Single Item Drug Screen Score: 0  Interpretation: Negative screen for possible drug use disorder    Brief Intervention  Alcohol & drug use screenings were reviewed. No concerns regarding substance use disorder identified.     Other Counseling Topics:   Car/seat belt/driving safety.     Social Determinants of Health     Food Insecurity: No Food Insecurity (7/15/2024)    Hunger Vital Sign     Worried About Running Out of Food in the Last Year: Never true     Ran Out of Food in  the Last Year: Never true   Transportation Needs: No Transportation Needs (7/15/2024)    PRAPARE - Transportation     Lack of Transportation (Medical): No     Lack of Transportation (Non-Medical): No   Housing Stability: Low Risk  (7/15/2024)    Housing Stability Vital Sign     Unable to Pay for Housing in the Last Year: No     Number of Times Moved in the Last Year: 1     Homeless in the Last Year: No   Utilities: Not At Risk (7/15/2024)    [facility] Utilities     Threatened with loss of utilities: No     No results found.    Objective     /76 (BP Location: Right arm, Patient Position: Sitting, Cuff Size: Adult)   Pulse 61   Resp 17   Ht 5' (1.524 m)   Wt 65.3 kg (144 lb)   LMP  (LMP Unknown)   SpO2 97%   BMI 28.12 kg/m²     Physical Exam  Vitals and nursing note reviewed.   Constitutional:       General: She is not in acute distress.     Appearance: She is well-developed.   HENT:      Head: Normocephalic and atraumatic.   Eyes:      Conjunctiva/sclera: Conjunctivae normal.   Cardiovascular:      Rate and Rhythm: Normal rate and regular rhythm.      Heart sounds: No murmur heard.  Pulmonary:      Effort: Pulmonary effort is normal. No respiratory distress.      Breath sounds: Normal breath sounds.   Abdominal:      Palpations: Abdomen is soft.      Tenderness: There is no abdominal tenderness.   Musculoskeletal:         General: No swelling.      Cervical back: Neck supple.   Skin:     General: Skin is warm and dry.      Capillary Refill: Capillary refill takes less than 2 seconds.   Neurological:      Mental Status: She is alert.   Psychiatric:         Mood and Affect: Mood normal.       Administrative Statements   I have spent a total time of 25 minutes in caring for this patient on the day of the visit/encounter including Instructions for management, Patient and family education, Risk factor reductions, Counseling / Coordination of care, and Obtaining or reviewing history  .

## 2024-07-15 NOTE — ASSESSMENT & PLAN NOTE
Patient with worsening dysarthria with recent large stroke.  Potentially just evolution of her previous stroke  Recent CTA done shows, among other lesions, Advanced atherosclerotic change of bilateral cervical carotid arteries. Estimated 85-90% left and 80% right proximal ICA stenosis.

## 2024-07-15 NOTE — ASSESSMENT & PLAN NOTE
Dry ulcer over tip of right 2nd toe.  Continue local wound care / foot care.     Rest pain has resolved due to collaterals. Short distance claudication is managed with resting in between activity.

## 2024-07-15 NOTE — ASSESSMENT & PLAN NOTE
Lab Results   Component Value Date    HGBA1C 5.9 (H) 07/09/2024       Improved A1c   Improved renal function.  Continue current plan.    Daily walking while holding chair in front of you, take steps with your leg and walk in spot.  Do that a few times a day to keep circulation better and help with diabetes control.    Will give referral to local podiatrist Dr. Cortez so that you can follow up regularly.

## 2024-07-15 NOTE — PATIENT INSTRUCTIONS
Medicare Preventive Visit Patient Instructions  Thank you for completing your Welcome to Medicare Visit or Medicare Annual Wellness Visit today. Your next wellness visit will be due in one year (7/16/2025).  The screening/preventive services that you may require over the next 5-10 years are detailed below. Some tests may not apply to you based off risk factors and/or age. Screening tests ordered at today's visit but not completed yet may show as past due. Also, please note that scanned in results may not display below.  Preventive Screenings:  Service Recommendations Previous Testing/Comments   Colorectal Cancer Screening  * Colonoscopy    * Fecal Occult Blood Test (FOBT)/Fecal Immunochemical Test (FIT)  * Fecal DNA/Cologuard Test  * Flexible Sigmoidoscopy Age: 45-75 years old   Colonoscopy: every 10 years (may be performed more frequently if at higher risk)  OR  FOBT/FIT: every 1 year  OR  Cologuard: every 3 years  OR  Sigmoidoscopy: every 5 years  Screening may be recommended earlier than age 45 if at higher risk for colorectal cancer. Also, an individualized decision between you and your healthcare provider will decide whether screening between the ages of 76-85 would be appropriate. Colonoscopy: Not on file  FOBT/FIT: Not on file  Cologuard: Not on file  Sigmoidoscopy: Not on file          Breast Cancer Screening Age: 40+ years old  Frequency: every 1-2 years  Not required if history of left and right mastectomy Mammogram: Not on file        Cervical Cancer Screening Between the ages of 21-29, pap smear recommended once every 3 years.   Between the ages of 30-65, can perform pap smear with HPV co-testing every 5 years.   Recommendations may differ for women with a history of total hysterectomy, cervical cancer, or abnormal pap smears in past. Pap Smear: Not on file    Screening Not Indicated   Hepatitis C Screening Once for adults born between 1945 and 1965  More frequently in patients at high risk for Hepatitis  C Hep C Antibody: 02/04/2020    Screening Current   Diabetes Screening 1-2 times per year if you're at risk for diabetes or have pre-diabetes Fasting glucose: 121 mg/dL (4/19/2024)  A1C: 5.9 % (7/9/2024)  Screening Not Indicated  History Diabetes   Cholesterol Screening Once every 5 years if you don't have a lipid disorder. May order more often based on risk factors. Lipid panel: 01/06/2024    Screening Not Indicated  History Lipid Disorder     Other Preventive Screenings Covered by Medicare:  Abdominal Aortic Aneurysm (AAA) Screening: covered once if your at risk. You're considered to be at risk if you have a family history of AAA.  Lung Cancer Screening: covers low dose CT scan once per year if you meet all of the following conditions: (1) Age 55-77; (2) No signs or symptoms of lung cancer; (3) Current smoker or have quit smoking within the last 15 years; (4) You have a tobacco smoking history of at least 20 pack years (packs per day multiplied by number of years you smoked); (5) You get a written order from a healthcare provider.  Glaucoma Screening: covered annually if you're considered high risk: (1) You have diabetes OR (2) Family history of glaucoma OR (3)  aged 50 and older OR (4)  American aged 65 and older  Osteoporosis Screening: covered every 2 years if you meet one of the following conditions: (1) You're estrogen deficient and at risk for osteoporosis based off medical history and other findings; (2) Have a vertebral abnormality; (3) On glucocorticoid therapy for more than 3 months; (4) Have primary hyperparathyroidism; (5) On osteoporosis medications and need to assess response to drug therapy.   Last bone density test (DXA Scan): Not on file.  HIV Screening: covered annually if you're between the age of 15-65. Also covered annually if you are younger than 15 and older than 65 with risk factors for HIV infection. For pregnant patients, it is covered up to 3 times per  pregnancy.    Immunizations:  Immunization Recommendations   Influenza Vaccine Annual influenza vaccination during flu season is recommended for all persons aged >= 6 months who do not have contraindications   Pneumococcal Vaccine   * Pneumococcal conjugate vaccine = PCV13 (Prevnar 13), PCV15 (Vaxneuvance), PCV20 (Prevnar 20)  * Pneumococcal polysaccharide vaccine = PPSV23 (Pneumovax) Adults 19-65 yo with certain risk factors or if 65+ yo  If never received any pneumonia vaccine: recommend Prevnar 20 (PCV20)  Give PCV20 if previously received 1 dose of PCV13 or PPSV23   Hepatitis B Vaccine 3 dose series if at intermediate or high risk (ex: diabetes, end stage renal disease, liver disease)   Respiratory syncytial virus (RSV) Vaccine - COVERED BY MEDICARE PART D  * RSVPreF3 (Arexvy) CDC recommends that adults 60 years of age and older may receive a single dose of RSV vaccine using shared clinical decision-making (SCDM)   Tetanus (Td) Vaccine - COST NOT COVERED BY MEDICARE PART B Following completion of primary series, a booster dose should be given every 10 years to maintain immunity against tetanus. Td may also be given as tetanus wound prophylaxis.   Tdap Vaccine - COST NOT COVERED BY MEDICARE PART B Recommended at least once for all adults. For pregnant patients, recommended with each pregnancy.   Shingles Vaccine (Shingrix) - COST NOT COVERED BY MEDICARE PART B  2 shot series recommended in those 19 years and older who have or will have weakened immune systems or those 50 years and older     Health Maintenance Due:      Topic Date Due   • Lung Cancer Screening  04/29/2025 (Originally 8/25/2022)   • Breast Cancer Screening: Mammogram  04/29/2025 (Originally 6/15/1985)   • Hepatitis C Screening  Completed   • Colorectal Cancer Screening  Discontinued     Immunizations Due:      Topic Date Due   • Influenza Vaccine (1) 09/01/2024     Advance Directives   What are advance directives?  Advance directives are legal  documents that state your wishes and plans for medical care. These plans are made ahead of time in case you lose your ability to make decisions for yourself. Advance directives can apply to any medical decision, such as the treatments you want, and if you want to donate organs.   What are the types of advance directives?  There are many types of advance directives, and each state has rules about how to use them. You may choose a combination of any of the following:  Living will:  This is a written record of the treatment you want. You can also choose which treatments you do not want, which to limit, and which to stop at a certain time. This includes surgery, medicine, IV fluid, and tube feedings.   Durable power of  for healthcare (DPAHC):  This is a written record that states who you want to make healthcare choices for you when you are unable to make them for yourself. This person, called a proxy, is usually a family member or a friend. You may choose more than 1 proxy.  Do not resuscitate (DNR) order:  A DNR order is used in case your heart stops beating or you stop breathing. It is a request not to have certain forms of treatment, such as CPR. A DNR order may be included in other types of advance directives.  Medical directive:  This covers the care that you want if you are in a coma, near death, or unable to make decisions for yourself. You can list the treatments you want for each condition. Treatment may include pain medicine, surgery, blood transfusions, dialysis, IV or tube feedings, and a ventilator (breathing machine).  Values history:  This document has questions about your views, beliefs, and how you feel and think about life. This information can help others choose the care that you would choose.  Why are advance directives important?  An advance directive helps you control your care. Although spoken wishes may be used, it is better to have your wishes written down. Spoken wishes can be  misunderstood, or not followed. Treatments may be given even if you do not want them. An advance directive may make it easier for your family to make difficult choices about your care.   Weight Management   Why it is important to manage your weight:  Being overweight increases your risk of health conditions such as heart disease, high blood pressure, type 2 diabetes, and certain types of cancer. It can also increase your risk for osteoarthritis, sleep apnea, and other respiratory problems. Aim for a slow, steady weight loss. Even a small amount of weight loss can lower your risk of health problems.  How to lose weight safely:  A safe and healthy way to lose weight is to eat fewer calories and get regular exercise. You can lose up about 1 pound a week by decreasing the number of calories you eat by 500 calories each day.   Healthy meal plan for weight management:  A healthy meal plan includes a variety of foods, contains fewer calories, and helps you stay healthy. A healthy meal plan includes the following:  Eat whole-grain foods more often.  A healthy meal plan should contain fiber. Fiber is the part of grains, fruits, and vegetables that is not broken down by your body. Whole-grain foods are healthy and provide extra fiber in your diet. Some examples of whole-grain foods are whole-wheat breads and pastas, oatmeal, brown rice, and bulgur.  Eat a variety of vegetables every day.  Include dark, leafy greens such as spinach, kale, harrison greens, and mustard greens. Eat yellow and orange vegetables such as carrots, sweet potatoes, and winter squash.   Eat a variety of fruits every day.  Choose fresh or canned fruit (canned in its own juice or light syrup) instead of juice. Fruit juice has very little or no fiber.  Eat low-fat dairy foods.  Drink fat-free (skim) milk or 1% milk. Eat fat-free yogurt and low-fat cottage cheese. Try low-fat cheeses such as mozzarella and other reduced-fat cheeses.  Choose meat and other  protein foods that are low in fat.  Choose beans or other legumes such as split peas or lentils. Choose fish, skinless poultry (chicken or turkey), or lean cuts of red meat (beef or pork). Before you cook meat or poultry, cut off any visible fat.   Use less fat and oil.  Try baking foods instead of frying them. Add less fat, such as margarine, sour cream, regular salad dressing and mayonnaise to foods. Eat fewer high-fat foods. Some examples of high-fat foods include french fries, doughnuts, ice cream, and cakes.  Eat fewer sweets.  Limit foods and drinks that are high in sugar. This includes candy, cookies, regular soda, and sweetened drinks.  Exercise:  Exercise at least 30 minutes per day on most days of the week. Some examples of exercise include walking, biking, dancing, and swimming. You can also fit in more physical activity by taking the stairs instead of the elevator or parking farther away from stores. Ask your healthcare provider about the best exercise plan for you.      © Copyright "[employer] 2018 Information is for End User's use only and may not be sold, redistributed or otherwise used for commercial purposes. All illustrations and images included in CareNotes® are the copyrighted property of [employer]., Inc[employer] or [employer]

## 2024-07-16 ENCOUNTER — APPOINTMENT (EMERGENCY)
Dept: VASCULAR ULTRASOUND | Facility: HOSPITAL | Age: 79
DRG: 872 | End: 2024-07-16
Payer: COMMERCIAL

## 2024-07-16 ENCOUNTER — TELEPHONE (OUTPATIENT)
Age: 79
End: 2024-07-16

## 2024-07-16 ENCOUNTER — NURSE TRIAGE (OUTPATIENT)
Age: 79
End: 2024-07-16

## 2024-07-16 ENCOUNTER — HOSPITAL ENCOUNTER (INPATIENT)
Facility: HOSPITAL | Age: 79
LOS: 3 days | DRG: 872 | End: 2024-07-19
Attending: EMERGENCY MEDICINE | Admitting: FAMILY MEDICINE
Payer: COMMERCIAL

## 2024-07-16 DIAGNOSIS — N17.9 AKI (ACUTE KIDNEY INJURY) (HCC): ICD-10-CM

## 2024-07-16 DIAGNOSIS — I10 HYPERTENSION, UNSPECIFIED TYPE: ICD-10-CM

## 2024-07-16 DIAGNOSIS — I73.9 CLAUDICATION IN PERIPHERAL VASCULAR DISEASE (HCC): Primary | ICD-10-CM

## 2024-07-16 DIAGNOSIS — I96 GANGRENE OF TOE OF RIGHT FOOT (HCC): ICD-10-CM

## 2024-07-16 PROBLEM — M79.674 TOE PAIN, RIGHT: Status: ACTIVE | Noted: 2024-07-16

## 2024-07-16 LAB
ALBUMIN SERPL BCG-MCNC: 3.8 G/DL (ref 3.5–5)
ALP SERPL-CCNC: 96 U/L (ref 34–104)
ALT SERPL W P-5'-P-CCNC: 4 U/L (ref 7–52)
ANION GAP SERPL CALCULATED.3IONS-SCNC: 15 MMOL/L (ref 4–13)
APTT PPP: 41 SECONDS (ref 23–37)
AST SERPL W P-5'-P-CCNC: 7 U/L (ref 13–39)
BASOPHILS # BLD AUTO: 0.07 THOUSANDS/ÂΜL (ref 0–0.1)
BASOPHILS NFR BLD AUTO: 1 % (ref 0–1)
BILIRUB SERPL-MCNC: 0.38 MG/DL (ref 0.2–1)
BUN SERPL-MCNC: 35 MG/DL (ref 5–25)
CALCIUM SERPL-MCNC: 8.8 MG/DL (ref 8.4–10.2)
CHLORIDE SERPL-SCNC: 104 MMOL/L (ref 96–108)
CO2 SERPL-SCNC: 21 MMOL/L (ref 21–32)
CREAT SERPL-MCNC: 2.19 MG/DL (ref 0.6–1.3)
EOSINOPHIL # BLD AUTO: 0.27 THOUSAND/ÂΜL (ref 0–0.61)
EOSINOPHIL NFR BLD AUTO: 2 % (ref 0–6)
ERYTHROCYTE [DISTWIDTH] IN BLOOD BY AUTOMATED COUNT: 13.5 % (ref 11.6–15.1)
GFR SERPL CREATININE-BSD FRML MDRD: 21 ML/MIN/1.73SQ M
GLUCOSE SERPL-MCNC: 106 MG/DL (ref 65–140)
HCT VFR BLD AUTO: 33.5 % (ref 34.8–46.1)
HGB BLD-MCNC: 11.1 G/DL (ref 11.5–15.4)
IMM GRANULOCYTES # BLD AUTO: 0.14 THOUSAND/UL (ref 0–0.2)
IMM GRANULOCYTES NFR BLD AUTO: 1 % (ref 0–2)
INR PPP: 1.17 (ref 0.84–1.19)
LYMPHOCYTES # BLD AUTO: 0.62 THOUSANDS/ÂΜL (ref 0.6–4.47)
LYMPHOCYTES NFR BLD AUTO: 4 % (ref 14–44)
MCH RBC QN AUTO: 29.1 PG (ref 26.8–34.3)
MCHC RBC AUTO-ENTMCNC: 33.1 G/DL (ref 31.4–37.4)
MCV RBC AUTO: 88 FL (ref 82–98)
MONOCYTES # BLD AUTO: 1.43 THOUSAND/ÂΜL (ref 0.17–1.22)
MONOCYTES NFR BLD AUTO: 10 % (ref 4–12)
NEUTROPHILS # BLD AUTO: 11.55 THOUSANDS/ÂΜL (ref 1.85–7.62)
NEUTS SEG NFR BLD AUTO: 82 % (ref 43–75)
NRBC BLD AUTO-RTO: 0 /100 WBCS
PLATELET # BLD AUTO: 397 THOUSANDS/UL (ref 149–390)
PMV BLD AUTO: 10 FL (ref 8.9–12.7)
POTASSIUM SERPL-SCNC: 4.4 MMOL/L (ref 3.5–5.3)
PROT SERPL-MCNC: 8.1 G/DL (ref 6.4–8.4)
PROTHROMBIN TIME: 15.6 SECONDS (ref 11.6–14.5)
RBC # BLD AUTO: 3.82 MILLION/UL (ref 3.81–5.12)
SODIUM SERPL-SCNC: 140 MMOL/L (ref 135–147)
WBC # BLD AUTO: 14.08 THOUSAND/UL (ref 4.31–10.16)

## 2024-07-16 PROCEDURE — 85025 COMPLETE CBC W/AUTO DIFF WBC: CPT | Performed by: EMERGENCY MEDICINE

## 2024-07-16 PROCEDURE — 96375 TX/PRO/DX INJ NEW DRUG ADDON: CPT

## 2024-07-16 PROCEDURE — 80053 COMPREHEN METABOLIC PANEL: CPT | Performed by: EMERGENCY MEDICINE

## 2024-07-16 PROCEDURE — 85730 THROMBOPLASTIN TIME PARTIAL: CPT | Performed by: EMERGENCY MEDICINE

## 2024-07-16 PROCEDURE — 36415 COLL VENOUS BLD VENIPUNCTURE: CPT | Performed by: EMERGENCY MEDICINE

## 2024-07-16 PROCEDURE — 96365 THER/PROPH/DIAG IV INF INIT: CPT

## 2024-07-16 PROCEDURE — 99285 EMERGENCY DEPT VISIT HI MDM: CPT

## 2024-07-16 PROCEDURE — 93923 UPR/LXTR ART STDY 3+ LVLS: CPT

## 2024-07-16 PROCEDURE — 99223 1ST HOSP IP/OBS HIGH 75: CPT | Performed by: FAMILY MEDICINE

## 2024-07-16 PROCEDURE — 93925 LOWER EXTREMITY STUDY: CPT

## 2024-07-16 PROCEDURE — 85610 PROTHROMBIN TIME: CPT | Performed by: EMERGENCY MEDICINE

## 2024-07-16 RX ORDER — LABETALOL 100 MG/1
300 TABLET, FILM COATED ORAL EVERY 12 HOURS SCHEDULED
Status: DISCONTINUED | OUTPATIENT
Start: 2024-07-16 | End: 2024-07-19 | Stop reason: HOSPADM

## 2024-07-16 RX ORDER — CEFAZOLIN SODIUM 1 G/50ML
1000 SOLUTION INTRAVENOUS EVERY 12 HOURS
Status: DISCONTINUED | OUTPATIENT
Start: 2024-07-16 | End: 2024-07-19 | Stop reason: HOSPADM

## 2024-07-16 RX ORDER — SODIUM CHLORIDE 9 MG/ML
75 INJECTION, SOLUTION INTRAVENOUS CONTINUOUS
Status: DISCONTINUED | OUTPATIENT
Start: 2024-07-16 | End: 2024-07-18

## 2024-07-16 RX ORDER — ALLOPURINOL 100 MG/1
100 TABLET ORAL DAILY
Status: DISCONTINUED | OUTPATIENT
Start: 2024-07-17 | End: 2024-07-19 | Stop reason: HOSPADM

## 2024-07-16 RX ORDER — HYDROMORPHONE HCL/PF 1 MG/ML
1 SYRINGE (ML) INJECTION ONCE
Status: COMPLETED | OUTPATIENT
Start: 2024-07-16 | End: 2024-07-16

## 2024-07-16 RX ORDER — ASPIRIN 81 MG/1
81 TABLET, CHEWABLE ORAL DAILY
Status: DISCONTINUED | OUTPATIENT
Start: 2024-07-17 | End: 2024-07-19 | Stop reason: HOSPADM

## 2024-07-16 RX ORDER — CINACALCET 30 MG/1
30 TABLET, FILM COATED ORAL EVERY OTHER DAY
Status: DISCONTINUED | OUTPATIENT
Start: 2024-07-16 | End: 2024-07-19 | Stop reason: HOSPADM

## 2024-07-16 RX ORDER — TRAZODONE HYDROCHLORIDE 50 MG/1
50 TABLET ORAL
Status: DISCONTINUED | OUTPATIENT
Start: 2024-07-16 | End: 2024-07-19 | Stop reason: HOSPADM

## 2024-07-16 RX ORDER — LIDOCAINE 50 MG/G
1 PATCH TOPICAL DAILY
Status: DISCONTINUED | OUTPATIENT
Start: 2024-07-17 | End: 2024-07-19 | Stop reason: HOSPADM

## 2024-07-16 RX ORDER — ONDANSETRON 2 MG/ML
4 INJECTION INTRAMUSCULAR; INTRAVENOUS EVERY 6 HOURS PRN
Status: DISCONTINUED | OUTPATIENT
Start: 2024-07-16 | End: 2024-07-19 | Stop reason: HOSPADM

## 2024-07-16 RX ORDER — PRAVASTATIN SODIUM 80 MG/1
80 TABLET ORAL
Status: DISCONTINUED | OUTPATIENT
Start: 2024-07-17 | End: 2024-07-19 | Stop reason: HOSPADM

## 2024-07-16 RX ORDER — HEPARIN SODIUM 5000 [USP'U]/ML
5000 INJECTION, SOLUTION INTRAVENOUS; SUBCUTANEOUS EVERY 8 HOURS SCHEDULED
Status: DISCONTINUED | OUTPATIENT
Start: 2024-07-16 | End: 2024-07-19 | Stop reason: HOSPADM

## 2024-07-16 RX ORDER — ONDANSETRON 2 MG/ML
4 INJECTION INTRAMUSCULAR; INTRAVENOUS ONCE
Status: COMPLETED | OUTPATIENT
Start: 2024-07-16 | End: 2024-07-16

## 2024-07-16 RX ORDER — ACETAMINOPHEN 325 MG/1
975 TABLET ORAL EVERY 8 HOURS SCHEDULED
Status: DISCONTINUED | OUTPATIENT
Start: 2024-07-16 | End: 2024-07-19 | Stop reason: HOSPADM

## 2024-07-16 RX ORDER — ESCITALOPRAM OXALATE 10 MG/1
20 TABLET ORAL DAILY
Status: DISCONTINUED | OUTPATIENT
Start: 2024-07-17 | End: 2024-07-19 | Stop reason: HOSPADM

## 2024-07-16 RX ORDER — LOSARTAN POTASSIUM 50 MG/1
100 TABLET ORAL DAILY
Status: DISCONTINUED | OUTPATIENT
Start: 2024-07-17 | End: 2024-07-19 | Stop reason: HOSPADM

## 2024-07-16 RX ORDER — SODIUM CHLORIDE, SODIUM GLUCONATE, SODIUM ACETATE, POTASSIUM CHLORIDE, MAGNESIUM CHLORIDE, SODIUM PHOSPHATE, DIBASIC, AND POTASSIUM PHOSPHATE .53; .5; .37; .037; .03; .012; .00082 G/100ML; G/100ML; G/100ML; G/100ML; G/100ML; G/100ML; G/100ML
1000 INJECTION, SOLUTION INTRAVENOUS ONCE
Status: COMPLETED | OUTPATIENT
Start: 2024-07-16 | End: 2024-07-16

## 2024-07-16 RX ORDER — AMLODIPINE BESYLATE 5 MG/1
5 TABLET ORAL DAILY
Status: DISCONTINUED | OUTPATIENT
Start: 2024-07-17 | End: 2024-07-19 | Stop reason: HOSPADM

## 2024-07-16 RX ORDER — CLONIDINE 0.3 MG/24H
1 PATCH, EXTENDED RELEASE TRANSDERMAL WEEKLY
Status: DISCONTINUED | OUTPATIENT
Start: 2024-07-16 | End: 2024-07-19 | Stop reason: HOSPADM

## 2024-07-16 RX ORDER — LOSARTAN POTASSIUM 100 MG/1
100 TABLET ORAL DAILY
Qty: 100 TABLET | Refills: 1 | Status: ON HOLD | OUTPATIENT
Start: 2024-07-16

## 2024-07-16 RX ORDER — HYDROMORPHONE HCL IN WATER/PF 6 MG/30 ML
0.2 PATIENT CONTROLLED ANALGESIA SYRINGE INTRAVENOUS EVERY 6 HOURS PRN
Status: DISCONTINUED | OUTPATIENT
Start: 2024-07-16 | End: 2024-07-19 | Stop reason: HOSPADM

## 2024-07-16 RX ORDER — OXYCODONE HYDROCHLORIDE 5 MG/1
5 TABLET ORAL EVERY 4 HOURS PRN
Status: DISCONTINUED | OUTPATIENT
Start: 2024-07-16 | End: 2024-07-19 | Stop reason: HOSPADM

## 2024-07-16 RX ADMIN — LABETALOL HYDROCHLORIDE 300 MG: 100 TABLET, FILM COATED ORAL at 21:52

## 2024-07-16 RX ADMIN — HEPARIN SODIUM 5000 UNITS: 5000 INJECTION INTRAVENOUS; SUBCUTANEOUS at 21:55

## 2024-07-16 RX ADMIN — HYDROMORPHONE HYDROCHLORIDE 1 MG: 1 INJECTION, SOLUTION INTRAMUSCULAR; INTRAVENOUS; SUBCUTANEOUS at 17:36

## 2024-07-16 RX ADMIN — SODIUM CHLORIDE 75 ML/HR: 0.9 INJECTION, SOLUTION INTRAVENOUS at 21:48

## 2024-07-16 RX ADMIN — TICAGRELOR 90 MG: 90 TABLET ORAL at 21:52

## 2024-07-16 RX ADMIN — SODIUM CHLORIDE, SODIUM GLUCONATE, SODIUM ACETATE, POTASSIUM CHLORIDE, MAGNESIUM CHLORIDE, SODIUM PHOSPHATE, DIBASIC, AND POTASSIUM PHOSPHATE 1000 ML: .53; .5; .37; .037; .03; .012; .00082 INJECTION, SOLUTION INTRAVENOUS at 19:19

## 2024-07-16 RX ADMIN — CEFAZOLIN SODIUM 1000 MG: 1 SOLUTION INTRAVENOUS at 21:49

## 2024-07-16 RX ADMIN — ACETAMINOPHEN 975 MG: 325 TABLET, FILM COATED ORAL at 21:52

## 2024-07-16 RX ADMIN — TRAZODONE HYDROCHLORIDE 50 MG: 50 TABLET ORAL at 21:53

## 2024-07-16 RX ADMIN — ONDANSETRON 4 MG: 2 INJECTION INTRAMUSCULAR; INTRAVENOUS at 18:04

## 2024-07-16 NOTE — TELEPHONE ENCOUNTER
Patients son calling to schedule appt with vascular surgeon for her foot wound and pain. Attempted to warm transfer him to vascular dept when call was ended by patient.  No further action needed

## 2024-07-16 NOTE — TELEPHONE ENCOUNTER
Received call from pt's son.  He calls concerned of pt's swelling that started 4 days ago. He believes it is because pt has not been elevating feet due to pain to her right toe ulcer.  Pt saw podiatry yesterday for this.  Pt was prescribed Levofloxacin 500 mg to take for 2 weeks.  He states pt has redness to swelling.  Swelling is located in right foot and ankle, and left foot. He states pt will be seeing pain management next month to help with her foot pain.    I've asked him to send images of her foot and ankle swelling.  Advised him I would send a message to triage to review and advise if pt needs any further testing at this time, or what the recommendation is.  Pt saw Dr. Gallo last 6/4/24.    Please advise.

## 2024-07-16 NOTE — ASSESSMENT & PLAN NOTE
"Lab Results   Component Value Date    HGBA1C 5.9 (H) 07/09/2024       No results for input(s): \"POCGLU\" in the last 72 hours.    Blood Sugar Average: Last 72 hrs:    Appears to be well-controlled as per hemoglobin A1c.  Will place the patient on sliding scale insulin here  "

## 2024-07-16 NOTE — TELEPHONE ENCOUNTER
"Reason for Disposition   MILD swelling of both ankles (i.e., pedal edema) AND new-onset or worsening    Answer Assessment - Initial Assessment Questions  1. ONSET: \"When did the swelling start?\" (e.g., minutes, hours, days)      4 days ago   2. LOCATION: \"What part of the leg is swollen?\"  \"Are both legs swollen or just one leg?\"      Right foot and ankle. Left foot. Wound on right big toe- ulcer   3. SEVERITY: \"How bad is the swelling?\" (e.g., localized; mild, moderate, severe)   - Localized - small area of swelling localized to one leg   - MILD pedal edema - swelling limited to foot and ankle, pitting edema < 1/4 inch (6 mm) deep, rest and elevation eliminate most or all swelling   - MODERATE edema - swelling of lower leg to knee, pitting edema > 1/4 inch (6 mm) deep, rest and elevation only partially reduce swelling   - SEVERE edema - swelling extends above knee, facial or hand swelling present       Mild   4. REDNESS: \"Does the swelling look red or infected?\"      Redness - on ABT  5. PAIN: \"Is the swelling painful to touch?\" If Yes, ask: \"How painful is it?\"   (Scale 1-10; mild, moderate or severe)      Yes, right toe and arch of foot. 10/10  6. FEVER: \"Do you have a fever?\" If Yes, ask: \"What is it, how was it measured, and when did it start?\"       Denies   7. CAUSE: \"What do you think is causing the leg swelling?\"      Not elevating feet  8. MEDICAL HISTORY: \"Do you have a history of heart failure, kidney disease, liver failure, or cancer?\"      Strokes. Kidney disease   9. RECURRENT SYMPTOM: \"Have you had leg swelling before?\" If Yes, ask: \"When was the last time?\" \"What happened that time?\"      Has had in past  10. OTHER SYMPTOMS: \"Do you have any other symptoms?\" (e.g., chest pain, difficulty breathing)        Shortness of breath from pain sometimes    Protocols used: Leg Swelling and Edema-ADULT-[place]    "

## 2024-07-16 NOTE — ED PROVIDER NOTES
History  Chief Complaint   Patient presents with    Toe Injury     Presents with black right big toe with pitting edema to right foot. Pedal pulses unable to be ausculted with doppler.      79-year-old female with a past medical history of hypertension, CKD, diabetes, and peripheral vascular disease who presents for evaluation with right leg pain.  She has had a right great toe wound for several weeks now.  She states that she has been following with podiatry once a week and they told her that the toe would likely auto-amputate.  Over the past few days, patient reports worsening pain in the right leg.  She states that the leg does feel better when she hangs it off over the side of the bed or has it on the ground when she is sitting.  Patient denies any other complaints at this time.        Prior to Admission Medications   Prescriptions Last Dose Informant Patient Reported? Taking?   B-D ULTRAFINE III SHORT PEN 31G X 8 MM MISC  Self, Child No No   Sig: USE TWICE A DAY   Blood Glucose Monitoring Suppl (OneTouch Verio Reflect) w/Device KIT   No No   Sig: Check blood sugars three times daily. Please substitute with appropriate alternative as covered by patient's insurance. Dx: E11.65   Diclofenac Sodium (VOLTAREN) 1 %   Yes No   Sig: Apply 2 g topically 4 (four) times a day   Glucagon 1 MG/0.2ML SOAJ  Self, Child Yes No   Sig: Inject 1 mg under the skin if needed (low blood sugar)   OneTouch Delica Lancets 33G MISC   No No   Sig: Check blood sugars three times daily. Please substitute with appropriate alternative as covered by patient's insurance. Dx: E11.65   acetaminophen (TYLENOL) 500 mg tablet  Self, Child Yes No   Sig: Take 650 mg by mouth every 6 (six) hours as needed for mild pain   allopurinol (ZYLOPRIM) 100 mg tablet   No No   Sig: TAKE 1 TABLET BY MOUTH EVERY DAY   amLODIPine (NORVASC) 5 mg tablet   No No   Sig: Take 1 tablet (5 mg total) by mouth daily Primary hypertension [I10]   aspirin 81 mg chewable  tablet  Self, Child Yes No   Sig: Chew 81 mg daily   cinacalcet (SENSIPAR) 30 mg tablet   No No   Sig: TAKE 1 TABLET (30 MG TOTAL) BY MOUTH EVERY OTHER DAY   cloNIDine (CATAPRES-TTS-3) 0.3 mg/24 hr  Child, Self No No   Sig: Place 1 patch (0.3 mg total) on the skin over 7 days once a week   collagenase (SANTYL) ointment  Child, Self No No   Sig: Apply topically daily   escitalopram (LEXAPRO) 20 mg tablet   No No   Sig: Take 1 tablet (20 mg total) by mouth daily   glucose blood (OneTouch Verio) test strip   No No   Sig: Check blood sugars three times daily. Please substitute with appropriate alternative as covered by patient's insurance. Dx: E11.65   labetalol (NORMODYNE) 300 mg tablet  Self, Child No No   Sig: Take 1 tablet (300 mg total) by mouth every 12 (twelve) hours   levofloxacin (LEVAQUIN) 500 mg tablet   Yes No   Sig: Take 500 mg by mouth every 24 hours 14 day dose   lidocaine (LIDODERM) 5 %  Self, Child Yes No   Sig: Apply 1 patch topically daily Remove & Discard patch within 12 hours or as directed by MD   linaGLIPtin (Tradjenta) 5 MG TABS  Child, Self No No   Sig: Take 5 mg by mouth daily   losartan (COZAAR) 100 MG tablet   No No   Sig: Take 1 tablet (100 mg total) by mouth daily   pravastatin (PRAVACHOL) 80 mg tablet  Child, Self No No   Sig: TAKE 1 TABLET BY MOUTH DAILY WITH DINNER   ticagrelor (BRILINTA) 90 MG  Child, Self No No   Sig: Take 1 tablet (90 mg total) by mouth every 12 (twelve) hours   traZODone (DESYREL) 50 mg tablet   No No   Sig: Take 1 tablet (50 mg total) by mouth daily at bedtime      Facility-Administered Medications: None       Past Medical History:   Diagnosis Date    Aphasia as late effect of cerebrovascular accident (CVA) 08/16/2023    Arthritis     Chronic kidney disease     Diabetes mellitus (HCC)     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/20/2021    Endometriosis     High cholesterol     History of left common carotid artery stent placement  10/27/2023    Hypertension     Kidney disease, chronic, stage III (moderate, EGFR 30-59 ml/min) ([facility])     Lumbar disc herniation     Median arcuate ligament syndrome ([facility]) 11/05/2019    Neuropathy     Obesity (BMI 30-39.9) 12/04/2017    Obesity, morbid ([facility]) 01/20/2021    Peripheral vascular disease ([facility])     Pneumonia     Shoulder injury     left    Spinal stenosis     Stroke ([facility]) 2015    Memory loss       Past Surgical History:   Procedure Laterality Date    CARDIAC CATHETERIZATION      CAROTID STENT Left 9/7/2023    Procedure: L TCAR;  Surgeon: Nicole Gallo MD;  [facility]: [facility];  Service: Vascular    COLONOSCOPY      FL RETROGRADE PYELOGRAM  4/6/2020    FRACTURE SURGERY Right     ankle    IR CAROTID STENT  9/7/2023    OVARIAN CYST SURGERY      VA CYSTO BLADDER W/URETERAL CATHETERIZATION Bilateral 4/6/2020    Procedure: CYSTOSCOPY WITH RETROGRADE PYELOGRAM;  Surgeon: Robert Mitchell MD;  [facility];  Service: Urology    VA CYSTO W/INSERT URETERAL STENT Right 4/6/2020    Procedure: INSERTION STENT URETERAL;  Surgeon: Robert Mitchell MD;  [facility];  Service: Urology    VA CYSTO W/REMOVAL OF TUMORS SMALL N/A 4/6/2020    Procedure: TRANSURETHRAL RESECTION OF BLADDER TUMOR (TURBT);  Surgeon: Robert Mitchell MD;  [facility];  Service: Urology    ROTATOR CUFF REPAIR Left     TONSILLECTOMY         Family History   Problem Relation Age of Onset    Hypertension Mother     Heart disease Mother         Valvular    Hyperlipidemia Mother     Hypertension Father     Heart defect Father         Cardiomegaly    Stroke Sister         Cerebrovascular Accident    Arthritis Brother     Other Brother         Back Disorder     I have reviewed and agree with the history as documented.    E-Cigarette/Vaping    E-Cigarette Use Never User      E-Cigarette/Vaping Substances    Nicotine No     THC No     CBD No     Flavoring No     Other No     Unknown No      Social History     Tobacco Use     Smoking status: Former     Current packs/day: 0.00     Average packs/day: 2.0 packs/day for 54.6 years (109.1 ttl pk-yrs)     Types: Cigarettes     Start date: 1/1/1966     Quit date: 7/27/2020     Years since quitting: 3.9    Smokeless tobacco: Never   Vaping Use    Vaping status: Never Used   Substance Use Topics    Alcohol use: Never    Drug use: Never       Review of Systems   Constitutional:  Negative for fever.   Cardiovascular:  Positive for leg swelling.   Musculoskeletal:         Right leg pain   Skin:  Positive for wound.   All other systems reviewed and are negative.      Physical Exam  Physical Exam  Vitals and nursing note reviewed.   Constitutional:       General: She is awake. She is not in acute distress.     Appearance: She is not toxic-appearing.   HENT:      Head: Normocephalic and atraumatic.   Eyes:      General: Vision grossly intact. Gaze aligned appropriately.   Cardiovascular:      Rate and Rhythm: Normal rate and regular rhythm.      Pulses:           Dorsalis pedis pulses are 0 on the right side.        Posterior tibial pulses are 0 on the right side.   Pulmonary:      Effort: Pulmonary effort is normal. No respiratory distress.   Musculoskeletal:      Cervical back: Full passive range of motion without pain and neck supple.   Feet:      Comments: No DP or PT Doppler signals noted.  Right foot is dusky in appearance and edematous.  Right great toe is gangrenous and completely black.  Skin:     General: Skin is warm and dry.   Neurological:      General: No focal deficit present.      Mental Status: She is alert and oriented to person, place, and time.         Vital Signs  ED Triage Vitals   Temperature Pulse Respirations Blood Pressure SpO2   07/16/24 1706 07/16/24 1706 07/16/24 1706 07/16/24 1706 07/16/24 1706   98 °F (36.7 °C) 89 18 122/54 98 %      Temp Source Heart Rate Source Patient Position - Orthostatic VS BP Location FiO2 (%)   07/16/24 1706 07/16/24 1706 07/16/24 1706 07/16/24  1706 --   Temporal Monitor Sitting Left arm       Pain Score       07/16/24 1736       10 - Worst Possible Pain           Vitals:    07/16/24 1706 07/16/24 1800 07/16/24 1900   BP: 122/54 158/71 154/62   Pulse: 89 87 79   Patient Position - Orthostatic VS: Sitting           Visual Acuity      ED Medications  Medications   multi-electrolyte (ISOLYTE-S PH 7.4) bolus 1,000 mL (1,000 mL Intravenous New Bag 7/16/24 1919)   HYDROmorphone (DILAUDID) injection 1 mg (1 mg Intravenous Given 7/16/24 1736)   ondansetron (ZOFRAN) injection 4 mg (4 mg Intravenous Given 7/16/24 1804)       Diagnostic Studies  Results Reviewed       Procedure Component Value Units Date/Time    Comprehensive metabolic panel [[id number]]  (Abnormal) Collected: 07/16/24 1738    Lab Status: Final result Specimen: Blood from Arm, Left Updated: 07/16/24 1809     Sodium 140 mmol/L      Potassium 4.4 mmol/L      Chloride 104 mmol/L      CO2 21 mmol/L      ANION GAP 15 mmol/L      BUN 35 mg/dL      Creatinine 2.19 mg/dL      Glucose 106 mg/dL      Calcium 8.8 mg/dL      AST 7 U/L      ALT 4 U/L      Alkaline Phosphatase 96 U/L      Total Protein 8.1 g/dL      Albumin 3.8 g/dL      Total Bilirubin 0.38 mg/dL      eGFR 21 ml/min/1.73sq m     Narrative:      National Kidney Disease Foundation guidelines for Chronic Kidney Disease (CKD):     Stage 1 with normal or high GFR (GFR > 90 mL/min/1.73 square meters)    Stage 2 Mild CKD (GFR = 60-89 mL/min/1.73 square meters)    Stage 3A Moderate CKD (GFR = 45-59 mL/min/1.73 square meters)    Stage 3B Moderate CKD (GFR = 30-44 mL/min/1.73 square meters)    Stage 4 Severe CKD (GFR = 15-29 mL/min/1.73 square meters)    Stage 5 End Stage CKD (GFR <15 mL/min/1.73 square meters)  Note: GFR calculation is accurate only with a steady state creatinine    Protime-INR [[id number]]  (Abnormal) Collected: 07/16/24 1738    Lab Status: Final result Specimen: Blood from Arm, Left Updated: 07/16/24 1803     Protime 15.6 seconds      INR  1.17    APTT [[id number]]  (Abnormal) Collected: 07/16/24 1738    Lab Status: Final result Specimen: Blood from Arm, Left Updated: 07/16/24 1803     PTT 41 seconds     CBC and differential [[id number]]  (Abnormal) Collected: 07/16/24 1738    Lab Status: Final result Specimen: Blood from Arm, Left Updated: 07/16/24 1744     WBC 14.08 Thousand/uL      RBC 3.82 Million/uL      Hemoglobin 11.1 g/dL      Hematocrit 33.5 %      MCV 88 fL      MCH 29.1 pg      MCHC 33.1 g/dL      RDW 13.5 %      MPV 10.0 fL      Platelets 397 Thousands/uL      nRBC 0 /100 WBCs      Segmented % 82 %      Immature Grans % 1 %      Lymphocytes % 4 %      Monocytes % 10 %      Eosinophils Relative 2 %      Basophils Relative 1 %      Absolute Neutrophils 11.55 Thousands/µL      Absolute Immature Grans 0.14 Thousand/uL      Absolute Lymphocytes 0.62 Thousands/µL      Absolute Monocytes 1.43 Thousand/µL      Eosinophils Absolute 0.27 Thousand/µL      Basophils Absolute 0.07 Thousands/µL                    VAS ARTERIAL DUPLEX- LOWER LIMB BILATERAL    (Results Pending)              Procedures  Procedures         ED Course  ED Course as of 07/16/24 1938 Tue Jul 16, 2024   1748 WBC(!): 14.08   1812 Creatinine(!): 2.19  Up from 1.27, meets criteria for RICARDO                                 SBIRT 22yo+      Flowsheet Row Most Recent Value   Initial Alcohol Screen: US AUDIT-C     1. How often do you have a drink containing alcohol? 0 Filed at: 07/16/2024 1729   2. How many drinks containing alcohol do you have on a typical day you are drinking?  0 Filed at: 07/16/2024 1729   3a. Male UNDER 65: How often do you have five or more drinks on one occasion? 0 Filed at: 07/16/2024 1729   3b. FEMALE Any Age, or MALE 65+: How often do you have 4 or more drinks on one occassion? 0 Filed at: 07/16/2024 1729   Audit-C Score 0 Filed at: 07/16/2024 1729   DALE: How many times in the past year have you...    Used an illegal drug or used a prescription medication for  non-medical reasons? Never Filed at: 07/16/2024 1729                      Medical Decision Making  Amount and/or Complexity of Data Reviewed  Labs: ordered. Decision-making details documented in ED Course.  Radiology: ordered.    Risk  Prescription drug management.  Decision regarding hospitalization.                 Disposition  Final diagnoses:   Claudication in peripheral vascular disease (HCC)   Gangrene of toe of right foot (HCC)   RICARDO (acute kidney injury) (HCC)     Time reflects when diagnosis was documented in both MDM as applicable and the Disposition within this note       Time User Action Codes Description Comment    7/16/2024  7:38 PM Kathy Ashford [I73.9] Claudication in peripheral vascular disease (HCC)     7/16/2024  7:38 PM Kathy Ashford [I96] Gangrene of toe of right foot (HCC)     7/16/2024  7:38 PM Kathy Ashford [N17.9] RICARDO (acute kidney injury) (HCC)           ED Disposition       ED Disposition   Admit    Condition   Stable    Date/Time   Tue Jul 16, 2024 1938    Comment   Case was discussed with NICOLLE and the patient's admission status was agreed to be Admission Status: inpatient status to the service of Dr. Longoria.               Follow-up Information    None         Patient's Medications   Discharge Prescriptions    No medications on file       No discharge procedures on file.    PDMP Review         Value Time User    PDMP Reviewed  Yes 4/6/2020  8:10 AM Robert Mitchell MD            ED Provider  Electronically Signed by

## 2024-07-16 NOTE — TELEPHONE ENCOUNTER
Recommend repeat evaluation in the office. She can try applying a gentle ace bandage to the foot to assist with swelling and elevate the leg as able. If she develops fever, chills, drainage, odor or the toe acutely worsens, she should go to the ER for evaluation and further work up.

## 2024-07-16 NOTE — ASSESSMENT & PLAN NOTE
Peripheral arterial disease as well as gangrene.  Will put the patient on multimodal pain regimen.

## 2024-07-16 NOTE — H&P
"[facility]  H&P  Name: Anamaria Parnell 79 y.o. female I MRN: [id number]  Unit/Bed#: ED 17 I Date of Admission: 7/16/2024   Date of Service: 7/16/2024 I Hospital Day: 0      Assessment & Plan   Toe pain, right  Assessment & Plan  Peripheral arterial disease as well as gangrene.  Will put the patient on multimodal pain regimen.    * Gangrene (HCC)  Assessment & Plan  With the patient on IV Ancef and monitor.  No history of MRSA as per patient.  Patient has mild leukocytosis we will continue to monitor  Podiatry consultation.  Following podiatry weekly as an outpatient and was told that her toe would auto amputate    Dysarthria  Assessment & Plan  Patient with a history of dysarthria from previous event.  Patient has a very mild dysarthria if any at this point.    Aortoiliac occlusive disease (HCC)  Assessment & Plan  Patient is a vasculopath.  Will consult vascular surgery    Polyneuropathy  Assessment & Plan  Multimodal pain regimen    Type 2 diabetes mellitus with diabetic nephropathy (HCC)  Assessment & Plan  Lab Results   Component Value Date    HGBA1C 5.9 (H) 07/09/2024       No results for input(s): \"POCGLU\" in the last 72 hours.    Blood Sugar Average: Last 72 hrs:    Appears to be well-controlled as per hemoglobin A1c.  Will place the patient on sliding scale insulin here    Acute renal failure superimposed on stage 3 chronic kidney disease (HCC)  Assessment & Plan  Lab Results   Component Value Date    EGFR 21 07/16/2024    EGFR 41 04/19/2024    EGFR 35 04/18/2024    CREATININE 2.19 (H) 07/16/2024    CREATININE 1.27 04/19/2024    CREATININE 1.44 (H) 04/18/2024   Patient with acute on chronic kidney disease.  Patient has CKD stage III baseline Fluctuates.  Looks like could be anywhere from 1.2-1.8.  Patient is 2.19.  Okay to get the patient very gentle hydration and monitor.  If worsens may need to consider nephrology consultation but can hold off for now.  We can also see if vascular " surgery needs any kind of intervention for which the patient will need kidneys prep prior to any intervention    Basilar artery stenosis  Assessment & Plan  Patient with a history of carotid surgery.  Stable from that standpoint.  Follows with Dr. Gallo       VTE Pharmacologic Prophylaxis:   High Risk (Score >/= 5) - Pharmacological DVT Prophylaxis Ordered: heparin. Sequential Compression Devices Ordered.  Code Status: Prior DNR level 3  Discussion with family: Updated  (son) at bedside.    Anticipated Length of Stay: Patient will be admitted on an inpatient basis with an anticipated length of stay of greater than 2 midnights secondary to having severe peripheral arterial disease with gangrene of the big toe on the right.    Total Time Spent on Date of Encounter in care of patient: 45 mins. This time was spent on one or more of the following: performing physical exam; counseling and coordination of care; obtaining or reviewing history; documenting in the medical record; reviewing/ordering tests, medications or procedures; communicating with other healthcare professionals and discussing with patient's family/caregivers.    Chief Complaint: Toe pain    History of Present Illness:  Anamaria Parnell is a 79 y.o. female with a PMH of peripheral arterial disease, chronic kidney disease, diabetes who presents with toe pain.  Patient states she has been having toe pain for a while getting progressively worse.  The patient been following up with podiatry weekly and was told that her toe would likely self amputate.  Patient states that the pain and the swelling has been getting worse.  Was localized to the toe and now it seems to be moving up into the foot.  Patient has severe vasculopathic disease.  They did try to get bedside Dopplers done which were difficult to get.  They did speak to vascular surgery and arterial Dopplers were done as well which appears to show more chronic changes.  They did not feel  heparin would be necessary at this point.  They recommended vascular surgery evaluation in the morning.  As far as the patient goes the patient has not had any fevers or chills but the toe pain and swelling has worsened to the point where she cannot ambulate.  The patient is mainly in electric wheelchair right now but prior to the significant pain which did start a week or 2 ago the patient was able to ambulate a little bit but since then she has not been able to because the pain is intense.  She does state that the redness and swelling are worsening as well..  The son just made an appointment with pain management as an outpatient    Review of Systems:  Review of Systems   Constitutional:  Positive for activity change and appetite change.   Musculoskeletal:         Toe pain redness and swelling   Neurological:  Positive for weakness.   All other systems reviewed and are negative.      Past Medical and Surgical History:   Past Medical History:   Diagnosis Date    Aphasia as late effect of cerebrovascular accident (CVA) 08/16/2023    Arthritis     Chronic kidney disease     Diabetes mellitus (HCC)     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/20/2021    Endometriosis     High cholesterol     History of left common carotid artery stent placement 10/27/2023    Hypertension     Kidney disease, chronic, stage III (moderate, EGFR 30-59 ml/min) ([facility])     Lumbar disc herniation     Median arcuate ligament syndrome (HCC) 11/05/2019    Neuropathy     Obesity (BMI 30-39.9) 12/04/2017    Obesity, morbid ([facility]) 01/20/2021    Peripheral vascular disease (HCC)     Pneumonia     Shoulder injury     left    Spinal stenosis     Stroke ([facility]) 2015    Memory loss       Past Surgical History:   Procedure Laterality Date    CARDIAC CATHETERIZATION      CAROTID STENT Left 9/7/2023    Procedure: L TCAR;  Surgeon: Nicole Gallo MD;  [facility]: [facility];  Service: Vascular    COLONOSCOPY      FL RETROGRADE  PYELOGRAM  4/6/2020    FRACTURE SURGERY Right     ankle    IR CAROTID STENT  9/7/2023    OVARIAN CYST SURGERY      IA CYSTO BLADDER W/URETERAL CATHETERIZATION Bilateral 4/6/2020    Procedure: CYSTOSCOPY WITH RETROGRADE PYELOGRAM;  Surgeon: Robert Mitchell MD;  [facility];  Service: Urology    IA CYSTO W/INSERT URETERAL STENT Right 4/6/2020    Procedure: INSERTION STENT URETERAL;  Surgeon: Robert Mitchell MD;  [facility];  Service: Urology    IA CYSTO W/REMOVAL OF TUMORS SMALL N/A 4/6/2020    Procedure: TRANSURETHRAL RESECTION OF BLADDER TUMOR (TURBT);  Surgeon: Robert Mitchell MD;  [facility];  Service: Urology    ROTATOR CUFF REPAIR Left     TONSILLECTOMY         Meds/Allergies:  Prior to Admission medications    Medication Sig Start Date End Date Taking? Authorizing Provider   acetaminophen (TYLENOL) 500 mg tablet Take 650 mg by mouth every 6 (six) hours as needed for mild pain    Historical Provider, MD   allopurinol (ZYLOPRIM) 100 mg tablet TAKE 1 TABLET BY MOUTH EVERY DAY 7/8/24   Savage Longoria MD   amLODIPine (NORVASC) 5 mg tablet Take 1 tablet (5 mg total) by mouth daily Primary hypertension [I10] 7/1/24   Ritchie Lawton MD   aspirin 81 mg chewable tablet Chew 81 mg daily    Historical Provider, MD SANDY ULTRAFINE III SHORT PEN 31G X 8 MM MISC USE TWICE A DAY 1/15/24   Ritchie Lawton MD   Blood Glucose Monitoring Suppl (OneTouch Verio Reflect) w/Device KIT Check blood sugars three times daily. Please substitute with appropriate alternative as covered by patient's insurance. Dx: E11.65 7/15/24   Ritchie Lawton MD   cinacalcet (SENSIPAR) 30 mg tablet TAKE 1 TABLET (30 MG TOTAL) BY MOUTH EVERY OTHER DAY 6/10/24 3/7/25  Ritchie Lawton MD   cloNIDine (CATAPRES-TTS-3) 0.3 mg/24 hr Place 1 patch (0.3 mg total) on the skin over 7 days once a week 3/12/24   Ritchie Lawton MD   collagenase (SANTYL) ointment Apply topically daily 4/20/24   Andi HARDY MD    Diclofenac Sodium (VOLTAREN) 1 % Apply 2 g topically 4 (four) times a day 7/8/24   Historical Provider, MD   escitalopram (LEXAPRO) 20 mg tablet Take 1 tablet (20 mg total) by mouth daily 7/10/24   Ritchie Lawton MD   Glucagon 1 MG/0.2ML SOAJ Inject 1 mg under the skin if needed (low blood sugar)    Historical Provider, MD   glucose blood (OneTouch Verio) test strip Check blood sugars three times daily. Please substitute with appropriate alternative as covered by patient's insurance. Dx: E11.65 7/15/24   Ritchie Lawton MD   labetalol (NORMODYNE) 300 mg tablet Take 1 tablet (300 mg total) by mouth every 12 (twelve) hours 2/7/24   Ritchie Lawton MD   levofloxacin (LEVAQUIN) 500 mg tablet Take 500 mg by mouth every 24 hours 14 day dose    Historical Provider, MD   lidocaine (LIDODERM) 5 % Apply 1 patch topically daily Remove & Discard patch within 12 hours or as directed by MD    Historical Provider, MD   linaGLIPtin (Tradjenta) 5 MG TABS Take 5 mg by mouth daily 4/18/24   GRACIELA Pineda   losartan (COZAAR) 100 MG tablet Take 1 tablet (100 mg total) by mouth daily 7/16/24   MD Latrell Hughesuch Delica Lancets 33G MISC Check blood sugars three times daily. Please substitute with appropriate alternative as covered by patient's insurance. Dx: E11.65 7/15/24   Ritchie Lawton MD   pravastatin (PRAVACHOL) 80 mg tablet TAKE 1 TABLET BY MOUTH DAILY WITH DINNER 4/1/24   Ritchie Lawton MD   ticagrelor (BRILINTA) 90 MG Take 1 tablet (90 mg total) by mouth every 12 (twelve) hours 4/8/24 10/5/24  Ritchie Lawton MD   traZODone (DESYREL) 50 mg tablet Take 1 tablet (50 mg total) by mouth daily at bedtime 7/1/24   Ritchie Lawton MD   losartan (COZAAR) 100 MG tablet Take 1 tablet (100 mg total) by mouth daily 1/15/24 7/16/24  Ritchie Lawton MD     I have reviewed home medications with a medical source (PCP, Pharmacy, other).    Allergies:   Allergies   Allergen Reactions     "Fenofibrate Other (See Comments)      blood in urine  hx  Kidney Failure    Colesevelam Other (See Comments)      leg pains    Colestipol Itching and Other (See Comments)      Swelling lower legs    Ezetimibe GI Intolerance    Statins Myalgia       Social History:  Marital Status: /Civil Union   Occupation: Retired  Patient Pre-hospital Living Situation: Home  Patient Pre-hospital Level of Mobility: power wheelchair  Patient Pre-hospital Diet Restrictions: None  Substance Use History:   Social History     Substance and Sexual Activity   Alcohol Use Never     Social History     Tobacco Use   Smoking Status Former    Current packs/day: 0.00    Average packs/day: 2.0 packs/day for 54.6 years (109.1 ttl pk-yrs)    Types: Cigarettes    Start date: 1/1/1966    Quit date: 7/27/2020    Years since quitting: 3.9   Smokeless Tobacco Never     Social History     Substance and Sexual Activity   Drug Use Never       Family History:  Family History   Problem Relation Age of Onset    Hypertension Mother     Heart disease Mother         Valvular    Hyperlipidemia Mother     Hypertension Father     Heart defect Father         Cardiomegaly    Stroke Sister         Cerebrovascular Accident    Arthritis Brother     Other Brother         Back Disorder       Physical Exam:     Vitals:   Blood Pressure: 154/62 (07/16/24 1900)  Pulse: 79 (07/16/24 1900)  Temperature: 98 °F (36.7 °C) (07/16/24 1706)  Temp Source: Temporal (07/16/24 1706)  Respirations: 16 (07/16/24 1900)  Height: 4' 10\" (147.3 cm) (07/16/24 1706)  Weight - Scale: 65.3 kg (144 lb) (07/16/24 1706)  SpO2: 94 % (07/16/24 1900)    Physical Exam   General Appearance:    Alert, cooperative, no distress, appears stated age   Head:    Normocephalic, without obvious abnormality, atraumatic   Eyes:    PERRL, conjunctiva/corneas clear, EOM's intact,             Nose:   Nares normal, septum midline, mucosa normal   Throat:   Lips, mucosa, and tongue normal; teeth and gums normal "   Neck:   Supple, symmetrical, no adenopathy;        thyroid:  No enlargement/tenderness/nodules; no carotid    bruit or JVD   Back:     Symmetric, no curvature, ROM normal, no CVA tenderness   Lungs:     Clear to auscultation bilaterally, respirations unlabored       Heart:    Regular rate and rhythm, S1 and S2 normal, no murmur, rub    or gallop   Abdomen:     Soft, non-tender, bowel sounds active all four quadrants,     no masses, no organomegaly           Extremities:   Extremities normal, atraumatic, no cyanosis or edema   Pulses:   2+ and symmetric all extremities   Skin: Gangrenous big toe on the lright pulses were difficult to palpate in the right lower extremity   Lymph nodes:   Cervical, supraclavicular, and axillary nodes normal   Neurologic:   CNII-XII intact. Normal strength, sensation and reflexes       throughout         Additional Data:     Lab Results:  Results from last 7 days   Lab Units 07/16/24  1738   WBC Thousand/uL 14.08*   HEMOGLOBIN g/dL 11.1*   HEMATOCRIT % 33.5*   PLATELETS Thousands/uL 397*   SEGS PCT % 82*   LYMPHO PCT % 4*   MONO PCT % 10   EOS PCT % 2     Results from last 7 days   Lab Units 07/16/24  1738   SODIUM mmol/L 140   POTASSIUM mmol/L 4.4   CHLORIDE mmol/L 104   CO2 mmol/L 21   BUN mg/dL 35*   CREATININE mg/dL 2.19*   ANION GAP mmol/L 15*   CALCIUM mg/dL 8.8   ALBUMIN g/dL 3.8   TOTAL BILIRUBIN mg/dL 0.38   ALK PHOS U/L 96   ALT U/L 4*   AST U/L 7*   GLUCOSE RANDOM mg/dL 106     Results from last 7 days   Lab Units 07/16/24  1738   INR  1.17         Lab Results   Component Value Date    HGBA1C 5.9 (H) 07/09/2024    HGBA1C 5.5 04/08/2024    HGBA1C 5.7 (H) 01/06/2024           Lines/Drains:  Invasive Devices       Peripheral Intravenous Line  Duration             Peripheral IV 07/16/24 Left Antecubital <1 day                        VAS ARTERIAL DUPLEX- LOWER LIMB BILATERAL    (Results Pending)       EKG and Other Studies Reviewed on Admission:   EKG: Personally Reviewed.    **  Please Note: This note has been constructed using a voice recognition system. **

## 2024-07-16 NOTE — ASSESSMENT & PLAN NOTE
Lab Results   Component Value Date    EGFR 21 07/16/2024    EGFR 41 04/19/2024    EGFR 35 04/18/2024    CREATININE 2.19 (H) 07/16/2024    CREATININE 1.27 04/19/2024    CREATININE 1.44 (H) 04/18/2024   Patient with acute on chronic kidney disease.  Patient has CKD stage III baseline Fluctuates.  Looks like could be anywhere from 1.2-1.8.  Patient is 2.19.  Okay to get the patient very gentle hydration and monitor.  If worsens may need to consider nephrology consultation but can hold off for now.  We can also see if vascular surgery needs any kind of intervention for which the patient will need kidneys prep prior to any intervention

## 2024-07-16 NOTE — ASSESSMENT & PLAN NOTE
Patient with a history of dysarthria from previous event.  Patient has a very mild dysarthria if any at this point.

## 2024-07-16 NOTE — ASSESSMENT & PLAN NOTE
With the patient on IV Ancef and monitor.  No history of MRSA as per patient.  Patient has mild leukocytosis we will continue to monitor  Podiatry consultation.  Following podiatry weekly as an outpatient and was told that her toe would auto amputate

## 2024-07-17 ENCOUNTER — APPOINTMENT (INPATIENT)
Dept: RADIOLOGY | Facility: HOSPITAL | Age: 79
DRG: 872 | End: 2024-07-17
Payer: COMMERCIAL

## 2024-07-17 PROBLEM — A41.9 SEPSIS (HCC): Status: ACTIVE | Noted: 2024-07-17

## 2024-07-17 LAB
ANION GAP SERPL CALCULATED.3IONS-SCNC: 11 MMOL/L (ref 4–13)
BACTERIA UR QL AUTO: ABNORMAL /HPF
BILIRUB UR QL STRIP: NEGATIVE
BUN SERPL-MCNC: 31 MG/DL (ref 5–25)
CALCIUM SERPL-MCNC: 7.6 MG/DL (ref 8.4–10.2)
CHLORIDE SERPL-SCNC: 106 MMOL/L (ref 96–108)
CLARITY UR: CLEAR
CO2 SERPL-SCNC: 20 MMOL/L (ref 21–32)
COLOR UR: ABNORMAL
CREAT SERPL-MCNC: 1.79 MG/DL (ref 0.6–1.3)
ERYTHROCYTE [DISTWIDTH] IN BLOOD BY AUTOMATED COUNT: 13.3 % (ref 11.6–15.1)
ERYTHROCYTE [DISTWIDTH] IN BLOOD BY AUTOMATED COUNT: 13.4 % (ref 11.6–15.1)
GFR SERPL CREATININE-BSD FRML MDRD: 26 ML/MIN/1.73SQ M
GLUCOSE SERPL-MCNC: 105 MG/DL (ref 65–140)
GLUCOSE SERPL-MCNC: 120 MG/DL (ref 65–140)
GLUCOSE SERPL-MCNC: 135 MG/DL (ref 65–140)
GLUCOSE SERPL-MCNC: 135 MG/DL (ref 65–140)
GLUCOSE SERPL-MCNC: 89 MG/DL (ref 65–140)
GLUCOSE UR STRIP-MCNC: NEGATIVE MG/DL
HCT VFR BLD AUTO: 30.4 % (ref 34.8–46.1)
HCT VFR BLD AUTO: 33.4 % (ref 34.8–46.1)
HGB BLD-MCNC: 11.3 G/DL (ref 11.5–15.4)
HGB BLD-MCNC: 9.7 G/DL (ref 11.5–15.4)
HGB UR QL STRIP.AUTO: NEGATIVE
KETONES UR STRIP-MCNC: NEGATIVE MG/DL
LEUKOCYTE ESTERASE UR QL STRIP: ABNORMAL
MCH RBC QN AUTO: 28.2 PG (ref 26.8–34.3)
MCH RBC QN AUTO: 29.4 PG (ref 26.8–34.3)
MCHC RBC AUTO-ENTMCNC: 31.9 G/DL (ref 31.4–37.4)
MCHC RBC AUTO-ENTMCNC: 33.8 G/DL (ref 31.4–37.4)
MCV RBC AUTO: 87 FL (ref 82–98)
MCV RBC AUTO: 88 FL (ref 82–98)
NITRITE UR QL STRIP: NEGATIVE
NON-SQ EPI CELLS URNS QL MICRO: ABNORMAL /HPF
PH UR STRIP.AUTO: 5.5 [PH]
PLATELET # BLD AUTO: 342 THOUSANDS/UL (ref 149–390)
PLATELET # BLD AUTO: 359 THOUSANDS/UL (ref 149–390)
PMV BLD AUTO: 10 FL (ref 8.9–12.7)
PMV BLD AUTO: 10.4 FL (ref 8.9–12.7)
POTASSIUM SERPL-SCNC: 3.9 MMOL/L (ref 3.5–5.3)
PROT UR STRIP-MCNC: ABNORMAL MG/DL
RBC # BLD AUTO: 3.44 MILLION/UL (ref 3.81–5.12)
RBC # BLD AUTO: 3.84 MILLION/UL (ref 3.81–5.12)
RBC #/AREA URNS AUTO: ABNORMAL /HPF
SODIUM SERPL-SCNC: 137 MMOL/L (ref 135–147)
SP GR UR STRIP.AUTO: 1.02 (ref 1–1.03)
UROBILINOGEN UR STRIP-ACNC: <2 MG/DL
WBC # BLD AUTO: 14.69 THOUSAND/UL (ref 4.31–10.16)
WBC # BLD AUTO: 16.56 THOUSAND/UL (ref 4.31–10.16)
WBC #/AREA URNS AUTO: ABNORMAL /HPF

## 2024-07-17 PROCEDURE — 99232 SBSQ HOSP IP/OBS MODERATE 35: CPT | Performed by: FAMILY MEDICINE

## 2024-07-17 PROCEDURE — 85027 COMPLETE CBC AUTOMATED: CPT | Performed by: FAMILY MEDICINE

## 2024-07-17 PROCEDURE — 87040 BLOOD CULTURE FOR BACTERIA: CPT | Performed by: FAMILY MEDICINE

## 2024-07-17 PROCEDURE — 97163 PT EVAL HIGH COMPLEX 45 MIN: CPT

## 2024-07-17 PROCEDURE — 87086 URINE CULTURE/COLONY COUNT: CPT | Performed by: FAMILY MEDICINE

## 2024-07-17 PROCEDURE — 99223 1ST HOSP IP/OBS HIGH 75: CPT | Performed by: PHYSICIAN ASSISTANT

## 2024-07-17 PROCEDURE — 97167 OT EVAL HIGH COMPLEX 60 MIN: CPT

## 2024-07-17 PROCEDURE — 80048 BASIC METABOLIC PNL TOTAL CA: CPT | Performed by: FAMILY MEDICINE

## 2024-07-17 PROCEDURE — 99223 1ST HOSP IP/OBS HIGH 75: CPT | Performed by: INTERNAL MEDICINE

## 2024-07-17 PROCEDURE — 82948 REAGENT STRIP/BLOOD GLUCOSE: CPT

## 2024-07-17 PROCEDURE — 81001 URINALYSIS AUTO W/SCOPE: CPT | Performed by: FAMILY MEDICINE

## 2024-07-17 PROCEDURE — 87077 CULTURE AEROBIC IDENTIFY: CPT | Performed by: FAMILY MEDICINE

## 2024-07-17 PROCEDURE — 93005 ELECTROCARDIOGRAM TRACING: CPT

## 2024-07-17 PROCEDURE — 71045 X-RAY EXAM CHEST 1 VIEW: CPT

## 2024-07-17 RX ORDER — SODIUM CHLORIDE 9 MG/ML
75 INJECTION, SOLUTION INTRAVENOUS CONTINUOUS
Status: DISCONTINUED | OUTPATIENT
Start: 2024-07-18 | End: 2024-07-17 | Stop reason: SDUPTHER

## 2024-07-17 RX ORDER — FAMOTIDINE 20 MG/1
20 TABLET, FILM COATED ORAL ONCE
Status: COMPLETED | OUTPATIENT
Start: 2024-07-17 | End: 2024-07-17

## 2024-07-17 RX ORDER — INSULIN LISPRO 100 [IU]/ML
1-5 INJECTION, SOLUTION INTRAVENOUS; SUBCUTANEOUS
Status: DISCONTINUED | OUTPATIENT
Start: 2024-07-17 | End: 2024-07-19 | Stop reason: HOSPADM

## 2024-07-17 RX ADMIN — SODIUM CHLORIDE 75 ML/HR: 0.9 INJECTION, SOLUTION INTRAVENOUS at 11:53

## 2024-07-17 RX ADMIN — TRAZODONE HYDROCHLORIDE 50 MG: 50 TABLET ORAL at 21:24

## 2024-07-17 RX ADMIN — PRAVASTATIN SODIUM 80 MG: 80 TABLET ORAL at 16:14

## 2024-07-17 RX ADMIN — CEFAZOLIN SODIUM 1000 MG: 1 SOLUTION INTRAVENOUS at 08:45

## 2024-07-17 RX ADMIN — ESCITALOPRAM OXALATE 20 MG: 10 TABLET ORAL at 08:45

## 2024-07-17 RX ADMIN — HEPARIN SODIUM 5000 UNITS: 5000 INJECTION INTRAVENOUS; SUBCUTANEOUS at 05:16

## 2024-07-17 RX ADMIN — ACETAMINOPHEN 975 MG: 325 TABLET, FILM COATED ORAL at 14:39

## 2024-07-17 RX ADMIN — CEFAZOLIN SODIUM 1000 MG: 1 SOLUTION INTRAVENOUS at 21:24

## 2024-07-17 RX ADMIN — TICAGRELOR 90 MG: 90 TABLET ORAL at 21:24

## 2024-07-17 RX ADMIN — HEPARIN SODIUM 5000 UNITS: 5000 INJECTION INTRAVENOUS; SUBCUTANEOUS at 21:24

## 2024-07-17 RX ADMIN — HEPARIN SODIUM 5000 UNITS: 5000 INJECTION INTRAVENOUS; SUBCUTANEOUS at 14:39

## 2024-07-17 RX ADMIN — ALLOPURINOL 100 MG: 100 TABLET ORAL at 08:45

## 2024-07-17 RX ADMIN — ASPIRIN 81 MG: 81 TABLET, CHEWABLE ORAL at 08:45

## 2024-07-17 RX ADMIN — TICAGRELOR 90 MG: 90 TABLET ORAL at 08:45

## 2024-07-17 RX ADMIN — AMLODIPINE BESYLATE 5 MG: 5 TABLET ORAL at 08:45

## 2024-07-17 RX ADMIN — LABETALOL HYDROCHLORIDE 300 MG: 100 TABLET, FILM COATED ORAL at 21:24

## 2024-07-17 RX ADMIN — LABETALOL HYDROCHLORIDE 300 MG: 100 TABLET, FILM COATED ORAL at 08:45

## 2024-07-17 RX ADMIN — ACETAMINOPHEN 975 MG: 325 TABLET, FILM COATED ORAL at 05:15

## 2024-07-17 RX ADMIN — ACETAMINOPHEN 975 MG: 325 TABLET, FILM COATED ORAL at 21:24

## 2024-07-17 RX ADMIN — FAMOTIDINE 20 MG: 20 TABLET, FILM COATED ORAL at 02:19

## 2024-07-17 RX ADMIN — LOSARTAN POTASSIUM 100 MG: 50 TABLET, FILM COATED ORAL at 08:45

## 2024-07-17 NOTE — CASE MANAGEMENT
Case Management Discharge Planning Note    Patient name Anamaria Parnell  Location /-01 MRN [id number]  : 1945 Date 2024       Current Admission Date: 2024  Current Admission Diagnosis:Sepsis (HCC)   Patient Active Problem List    Diagnosis Date Noted Date Diagnosed    Sepsis (HCC) 2024     Gangrene (HCC) 2024     Diabetic ulcer of toe of right foot associated with diabetes mellitus due to underlying condition, limited to breakdown of skin (HCC) 2024     Carotid stenosis, asymptomatic, right 10/27/2023     Complex renal cyst 10/18/2023     Encounter for follow-up surveillance of urothelial carcinoma of lower urinary tract 10/18/2023     Encounter to discuss test results 10/17/2023     Smoking 2023     Renal cyst 2023     Primary hypertension 2023     Dysarthria 08/15/2023     Stage 3b chronic kidney disease (HCC)      Aortoiliac occlusive disease (HCC) 10/11/2022     History of gastrointestinal stromal tumor (GIST) 2022     Secondary hyperparathyroidism of renal origin (HCC) 2022     Gastrointestinal stromal tumor (GIST) (HCC) 2022     Type 2 diabetes mellitus, without long-term current use of insulin (HCC) 2021     Type 2 diabetes mellitus with diabetic peripheral angiopathy without gangrene (HCC) 2021     Depression, recurrent (HCC) 2021     Stage 4 chronic kidney disease (HCC) 2020     Hypertensive kidney disease with stage 4 chronic kidney disease (HCC) 2020     Cervical radiculopathy 2020     Malignant neoplasm of overlapping sites of bladder (HCC) 2020     Stenosis of left anterior descending artery Mid LAD 50-60% stenosis 2020     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 2020     Pulmonary nodule 7 mm groundglass opacity in the right upper lobe, unchanged since 2020     Atherosclerosis of native arteries  of right leg with ulceration of other part of foot ([facility]) 03/03/2020     Symptomatic carotid artery stenosis, left 03/03/2020     Femoral artery stenosis, right ([facility]) 50-75% stenosis in the common femoral artery. 01/14/2020     Mesenteric artery stenosis ([facility]) 01/14/2020     Positive cardiac stress test  small, mildly severe, partially reversible myocardial perfusion defect of anterior and inferior wall  11/12/2019     Abnormal CT of the chest suspicious for an infectious or inflammatory bronchiolitis. 11/07/2019     Celiac artery stenosis ([facility]) >70% stenosis in the celiac trunk 11/05/2019     Aortoiliac stenosis, left ([facility]) 02/25/2019     Spondylosis of lumbar region without myelopathy or radiculopathy 01/29/2019     Lumbosacral spondylosis without myelopathy 01/29/2019     Statin intolerance 01/22/2019     Lumbar disc herniation 01/22/2019     Herniated lumbar intervertebral disc 01/22/2019     Chronic GERD 12/04/2017     Acute renal failure superimposed on stage 3 chronic kidney disease ([facility]) 12/04/2017     Claudication in peripheral vascular disease ([facility]) 12/04/2017     Gout 12/04/2017     Hx-TIA (transient ischemic attack) 12/04/2017     Hyperlipidemia associated with type 2 diabetes mellitus  ([facility]) 12/04/2017     Hypertension associated with diabetes  ([facility]) 12/04/2017     Osteoarthritis 12/04/2017     Right renal artery stenosis ([facility]) 12/04/2017     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     Hyperlipidemia, unspecified 12/04/2017     Vitamin D deficiency 09/08/2016     Basilar artery stenosis 06/22/2016     Type 2 diabetes mellitus with diabetic nephropathy ([facility]) 12/19/2015     Hypercholesteremia 12/19/2015     Hypertension 12/19/2015     CVA (cerebral vascular accident) ([facility]) 11/09/2015       LOS (days): 1  Geometric Mean LOS (GMLOS) (days): 3.6  Days to GMLOS:2.8     OBJECTIVE:  Risk of Unplanned Readmission Score: 29.8         Current admission status: Inpatient   Preferred Pharmacy:   [id number]  - [place] - [address]  [address]  [place] [address]  Phone: [phone] Fax: [phone]    Primary Care Provider: Ritchie Lawton MD    Primary Insurance: [facility]  Secondary Insurance:     DISCHARGE DETAILS:                                          Other Referral/Resources/Interventions Provided:  Referral Comments: Pt admitted d/t gangrene of R large toe.  Podiatry, vascular, nephrology consults, also PT/OT.  Will continue to follow for treatment team recommendations.         Treatment Team Recommendation: Other (TBD)  Discharge Destination Plan:: Other (TBD)  Transport at Discharge : Other (Comment) (TBD)

## 2024-07-17 NOTE — PLAN OF CARE
Problem: PHYSICAL THERAPY ADULT  Goal: Performs mobility at highest level of function for planned discharge setting.  See evaluation for individualized goals.  Description: Treatment/Interventions: Functional transfer training, LE strengthening/ROM, Therapeutic exercise, Endurance training, Patient/family training, Equipment eval/education, Bed mobility  Equipment Recommended:  (TBD)       See flowsheet documentation for full assessment, interventions and recommendations.  Note: Prognosis: Good  Problem List: Decreased strength, Decreased endurance, Impaired balance, Decreased mobility, Pain  Assessment: Anamaria Parnell is a 79 y.o. female admitted to [facility] on 7/16/2024 for Sepsis (HCC). Pt  has a past medical history of Aphasia as late effect of cerebrovascular accident (CVA) (08/16/2023), Arthritis, Chronic kidney disease, Diabetes mellitus (HCC), Embolism and 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) (01/20/2021), Endometriosis, High cholesterol, History of left common carotid artery stent placement (10/27/2023), Hypertension, Kidney disease, chronic, stage III (moderate, EGFR 30-59 ml/min) (HCC), Lumbar disc herniation, Median arcuate ligament syndrome (HCC) (11/05/2019), Neuropathy, Obesity (BMI 30-39.9) (12/04/2017), Obesity, morbid (HCC) (01/20/2021), Peripheral vascular disease (HCC), Pneumonia, Shoulder injury, Spinal stenosis, and Stroke ([facility]) (2015).. Order placed for PT eval and tx. PT was consulted and pt was seen on 7/17/2024 for mobility assessment and d/c planning. Chart review and two person identifiers were completed.   Currently pt presents with decreased strength , decreased static sitting balance , decreased dynamic sitting balance , decreased static standing balance, decreased dynamic standing balance , and decreased muscular endurance . Due to these impairments, they will require assistance to perform bed mobility, sit to stand , ambulation, and transfers. Pt is currently  functioning at a minimum assistance x1 level for bed mobility, minimum assistance x1 level for transfers. Pt greeted seated in recliner and agreeable to PT session. Pt completed 2 stand pivot transfers with min A x1. Surgical shoe donned prior to transfers. Pt ended session seated in chair with alarm on and all needs within reach. These activity limitations significantly impact their ability to participate in previous home and community roles and responsibilities  and ambulation in home. The patient's AM-PAC Basic Mobility Inpatient Short Form Raw Score is 15. PT recommends level II moderate resource intensity. They will benefit from skilled therapy to to reduce the risk of falls and to maximize functional potential.  Barriers to Discharge: Inaccessible home environment, Decreased caregiver support     Rehab Resource Intensity Level, PT: II (Moderate Resource Intensity)    See flowsheet documentation for full assessment.

## 2024-07-17 NOTE — CONSULTS
[facility]  Vascular surgery consult note  Name: Anamaria Parnell I  MRN: [id number]  Unit/Bed#: -01 I Date of Admission: 7/16/2024   Date of Service: 7/17/2024 I Hospital Day: 1    Assessment & Plan   Gangrene ([facility])  Assessment & Plan  CLTI with toe gangrene  Aortoiliac occlusive disease  Atherosclerosis lower extremities with ulceration  Diabetes with neuropathy  CKD IV    79 yoF former smoker and vasculopathy with obesity, CKD IV, DM II, HLD, HTN, CAD, renal artery stenosis, mesenteric artery stenosis, AIOD/PAD with chronic right foot wounds, L MCA and PCA CVA s/p L TCAR 9/7/23 ([facility]). Patient with chronic right lower extremity chronic limb threatening ischemia with right great toe gangrene.  Right great toe injury ongoing for months and has been demarcating.  Patient now presents due to increased toe pain. She has been placed on cephalexin for possible soft tissue infection. Vascular surgery is asked to evaluate.    Data:  CBC 14.69 (16.56), HGB 9.7,   BUN/creat 31/1.79,. eGFR 26 (1.4-2.2)  PT/INR 15.6/1.17  A1c 5.9  Blood cultures pending  Urine culture pending    Imaging:  -TEA 7/16/24:    R 0.11/--/--; Severe fem-pop disease c HG stenosis/occlusion prox-distal SFA c recon popliteal. Tib/peroneal occlusive disease. Tib/peroneal occlusive disease. Distal AT artery occlusion.    L 0.31/20/19; Severe diffuse fem-pop disease with HG v occlusion prox-mid SFA with recon distal SFA. Tib/Peronea occlusive disease. Entire PT artery HG v occlusion.     C/w 4/2024, R SFA occlusion now extends into the distal SFA and decreased L LISA/TBI    -AOIL 11/7/2023:  Occlusion R distal JACI.     >70% celiac trunk. >60% R renal. L renal not visualized.     LISA R 0.29/--/-- with flat-lined PVR/PPG tracings. L 0.44/82/50      Plan: Patient with chronic limb threatening ischemia with chronic, advanced calcific atherosclerotic occlusive disease and R hallux dry gangrene who presents for worsening R  "toe gangrene and pain.     -Severe multilevel calcified atherosclerotic occlusive disease involving the right iliac system, right common femoral, profundofemoral and entire SFA, as well as tibioperoneal disease leading to poor perfusion in the foot.  LISA extremely low at 0.11 with flatlined PVR/PPG tracings and poor perfusion to the foot which flatlined metatarsal and great toe pressures.  -Recurrent R foot cellulitis which appears improving on antibiotics with cephalexin  -Stable, but worsening R hallux dry gangrene which is much worse than 1 month ago; foot otherwise does not appear to be acutely infected; grossly motor-sensory intact.   -Trend WBC and follow-up blood cultures  -Continue with aspirin and ticagrelor  -Will engage nephrology to help optimize her and provide rec's for pre-hydration for CTA a/p with runoff to further delineate her anatomy and decide upon vascular options.  -Pain and medical management as per Slim/admitting team  -Local wound care as per podiatry  -Will continue to monitor with you and further recommendations forthcoming hospital course and testing  -Reviewed with Dr. Reynaga and plan discussed with SIM and family         Requesting Service: SLIM    Reason for consultation: Atherosclerosis of lower extremity with right great toe gangrene    HPI: Anamaria Parnell is a 79 y.o. female who presents to [facility] for evaluation of foot pain and swelling.  Patient has had gangrene to the right hallux 4 months which has progressively worsened.  She is followed by vascular surgery and podiatry.  She has been waiting for the toe to \"autoamputate\" as per podiatry. In the last days to week, the pain has become severe associated with bilateral leg swelling for which she presented to the hospital for evaluation.  Patient is accompanied by her son.  She generally elevates her legs but in the past week, she has had to have her leg in dependency due to pain which has worsened leg " "swelling.  The right foot is generally tender.  She denies drainage from the gangrenous toe or red streaking (although images on chart show foot was erythematous and edematous on presentation.)  Son states that Tylenol has been ineffective in treating her pain.  She has had no fevers or chills.  She has an electric scooter but she does stand, transfer and assist in her care.     She was admitted, placed on cephalexin intravenously and has regimen of narcotic pain Rx.  Patient states that the right foot feels much improved today, \"terrific\" and pain is controlled with current Rx and antibiotic. She has no pain and she moves the foot and stands. (Note, the forefoot was tender to palpation on examination.)    Patient did have episode of hypothermia this morning and placed in bear blanket.  She denies fevers or chills.  She admits to baseline orthopnea and sleeps in a chair.  No increased shortness of breath.  No abdominal pain or diarrhea.     Last seen by Dr. Gallo in the office on 6/4/2024 who noted patient has multi level occlusive disease with severe calcified atherosclerotic disease in the right iliac system, right common femoral and proximal SFA and profunda femoral arteries. There is occlusion of right SFA. There is very poor perfusion of right foot. Limited options and high risk due to extend to disease, additionally body habitus and pannus.     Medical therapy includes: Aspirin 81, ticagrelor 90 twice daily, pravastatin 80, narcotic pain Rx as per SLIM, cefazoline 1 g IV every 12 hours, etc.      Review of Systems:  Review of systems performed and is as above, otherwise essentially negative.    General: negative  Cardiovascular: no chest pain or dyspnea on exertion  Respiratory: chronic orthopnea; no increased shortness of breath  Gastrointestinal: no abdominal pain, change in bowel habits, or black or bloody stools  Genitourinary ROS: no dysuria, trouble voiding, or hematuria  Musculoskeletal ROS: Ambulatory " dysfunction and right great toe  Neurological ROS: no TIA or stroke symptoms  Hematological and Lymphatic ROS: negative  Dermatological ROS: negative  Psychological ROS: negative  Ophthalmic ROS: negative  ENT ROS: negative    Past Medical History:  Past Medical History:   Diagnosis Date    Aphasia as late effect of cerebrovascular accident (CVA) 08/16/2023    Arthritis     Chronic kidney disease     Diabetes mellitus (HCC)     Embolism and 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 ([facility]) 01/20/2021    Endometriosis     High cholesterol     History of left common carotid artery stent placement 10/27/2023    Hypertension     Kidney disease, chronic, stage III (moderate, EGFR 30-59 ml/min) ([facility])     Lumbar disc herniation     Median arcuate ligament syndrome ([facility]) 11/05/2019    Neuropathy     Obesity (BMI 30-39.9) 12/04/2017    Obesity, morbid ([facility]) 01/20/2021    Peripheral vascular disease ([facility])     Pneumonia     Shoulder injury     left    Spinal stenosis     Stroke ([facility]) 2015    Memory loss       Past Surgical History:  Past Surgical History:   Procedure Laterality Date    CARDIAC CATHETERIZATION      CAROTID STENT Left 9/7/2023    Procedure: L TCAR;  Surgeon: Nicole Gallo MD;  [facility]: [facility];  Service: Vascular    COLONOSCOPY      FL RETROGRADE PYELOGRAM  4/6/2020    FRACTURE SURGERY Right     ankle    IR CAROTID STENT  9/7/2023    OVARIAN CYST SURGERY      OK CYSTO BLADDER W/URETERAL CATHETERIZATION Bilateral 4/6/2020    Procedure: CYSTOSCOPY WITH RETROGRADE PYELOGRAM;  Surgeon: Robert Mitchell MD;  Location[facility];  Service: Urology    OK CYSTO W/INSERT URETERAL STENT Right 4/6/2020    Procedure: INSERTION STENT URETERAL;  Surgeon: Robert Mitchell MD;  [facility];  Service: Urology    OK CYSTO W/REMOVAL OF TUMORS SMALL N/A 4/6/2020    Procedure: TRANSURETHRAL RESECTION OF BLADDER TUMOR (TURBT);  Surgeon: Robert Mitchell MD;  [facility];  Service: Urology    ROTATOR  CUFF REPAIR Left     TONSILLECTOMY         Social History:  Social History     Substance and Sexual Activity   Alcohol Use Never     Social History     Substance and Sexual Activity   Drug Use Never     Social History     Tobacco Use   Smoking Status Former    Current packs/day: 0.00    Average packs/day: 2.0 packs/day for 54.6 years (109.1 ttl pk-yrs)    Types: Cigarettes    Start date: 1/1/1966    Quit date: 7/27/2020    Years since quitting: 3.9   Smokeless Tobacco Never       Family History:  Family History   Problem Relation Age of Onset    Hypertension Mother     Heart disease Mother         Valvular    Hyperlipidemia Mother     Hypertension Father     Heart defect Father         Cardiomegaly    Stroke Sister         Cerebrovascular Accident    Arthritis Brother     Other Brother         Back Disorder       Allergies:  Allergies   Allergen Reactions    Fenofibrate Other (See Comments)      blood in urine  hx  Kidney Failure    Colesevelam Other (See Comments)      leg pains    Colestipol Itching and Other (See Comments)      Swelling lower legs    Ezetimibe GI Intolerance    Statins Myalgia       Medications:  Current Facility-Administered Medications   Medication Dose Route Frequency    acetaminophen (TYLENOL) tablet 975 mg  975 mg Oral Q8H JAVIER    allopurinol (ZYLOPRIM) tablet 100 mg  100 mg Oral Daily    amLODIPine (NORVASC) tablet 5 mg  5 mg Oral Daily    aspirin chewable tablet 81 mg  81 mg Oral Daily    ceFAZolin (ANCEF) IVPB (premix in dextrose) 1,000 mg 50 mL  1,000 mg Intravenous Q12H    cinacalcet (SENSIPAR) tablet 30 mg  30 mg Oral Every Other Day    cloNIDine (CATAPRES-TTS-3) 0.3 mg/24 hr TD weekly patch  1 patch Transdermal Weekly    escitalopram (LEXAPRO) tablet 20 mg  20 mg Oral Daily    heparin (porcine) subcutaneous injection 5,000 Units  5,000 Units Subcutaneous Q8H JAVIER    HYDROmorphone HCl (DILAUDID) injection 0.2 mg  0.2 mg Intravenous Q6H PRN    insulin lispro (HumALOG/ADMELOG) 100 units/mL  "subcutaneous injection 1-5 Units  1-5 Units Subcutaneous TID AC    labetalol (NORMODYNE) tablet 300 mg  300 mg Oral Q12H JAVIER    lidocaine (LIDODERM) 5 % patch 1 patch  1 patch Topical Daily    losartan (COZAAR) tablet 100 mg  100 mg Oral Daily    ondansetron (ZOFRAN) injection 4 mg  4 mg Intravenous Q6H PRN    oxyCODONE (ROXICODONE) IR tablet 5 mg  5 mg Oral Q4H PRN    oxyCODONE (ROXICODONE) split tablet 2.5 mg  2.5 mg Oral Q4H PRN    pravastatin (PRAVACHOL) tablet 80 mg  80 mg Oral Daily With Dinner    sodium chloride 0.9 % infusion  75 mL/hr Intravenous Continuous    ticagrelor (BRILINTA) tablet 90 mg  90 mg Oral Q12H JAVIER    traZODone (DESYREL) tablet 50 mg  50 mg Oral HS       Vitals:  /51   Pulse 69   Temp 98 °F (36.7 °C)   Resp 18   Ht 4' 10\" (1.473 m)   Wt 65.3 kg (144 lb)   LMP  (LMP Unknown)   SpO2 96%   BMI 30.10 kg/m²     I/Os:  I/O last 24 hours:  In: 170 [P.O.:170]  Out: 625 [Urine:625]    Lab Results and Cultures:   Lab Results   Component Value Date    WBC 14.69 (H) 07/17/2024    HGB 9.7 (L) 07/17/2024    HCT 30.4 (L) 07/17/2024    MCV 88 07/17/2024     07/17/2024     Lab Results   Component Value Date    GLUCOSE 130 09/07/2023    CALCIUM 7.6 (L) 07/17/2024    K 3.9 07/17/2024    CO2 20 (L) 07/17/2024     07/17/2024    BUN 31 (H) 07/17/2024    CREATININE 1.79 (H) 07/17/2024     Lab Results   Component Value Date    INR 1.17 07/16/2024    INR 0.92 04/18/2024    INR 0.96 09/08/2023    PROTIME 15.6 (H) 07/16/2024    PROTIME 12.9 04/18/2024    PROTIME 13.0 09/08/2023       Lipid Panel: No results found for: \"CHOL\",     Blood Culture:   Lab Results   Component Value Date    BLOODCX Received in Microbiology Lab. Culture in Progress. 07/17/2024   ,   Urinalysis:   Lab Results   Component Value Date    COLORU Light Yellow 07/17/2024    CLARITYU Clear 07/17/2024    SPECGRAV 1.016 07/17/2024    PHUR 5.5 07/17/2024    LEUKOCYTESUR Moderate (A) 07/17/2024    NITRITE Negative 07/17/2024 " "   GLUCOSEU Negative 07/17/2024    KETONESU Negative 07/17/2024    BILIRUBINUR Negative 07/17/2024    BLOODU Negative 07/17/2024   ,   Urine Culture:   Lab Results   Component Value Date    URINECX >100,000 cfu/ml Escherichia coli (A) 08/22/2023   ,   Wound Culure: No results found for: \"WOUNDCULT\"    Imaging:      TEA 7/16/24:  CLINICAL:  Indications:  Patient with known severe PAD presents with new onset of pain in the feet and  calves bilaterally.  Operative History:  2023-09-07 ICA stent TCAR  2020-02-26 Cardiac catheterization  Risk Factors  The patient has history of Obesity, HTN, Diabetes (NIDDM (Diet)),  Hyperlipidemia, CKD, PAD, CAD and previous smoking (quit <1yr ago).  Clinical  Right Pressure:  154/ mm Hg, Left Pressure:  154/ mm Hg.     FINDINGS:     Segment                Right                        Left                                            Impression  PSV (cm/s)  EDV  Impression  PSV (cm/s)  EDV    Common Femoral Artery                      74   24                      49   12    Prox Profunda                              34   14                      21    6    Prox SFA               Occluded                     Occluded                       Mid SFA                Occluded                     Occluded                       Dist SFA               Occluded                                         30   15    Proximal Pop                               40   31                      19   10    Distal Pop                                 17   11                      31   18    Tibioperoneal                              18   13                      42   20    Prox Post Tibial                                    Occluded                       Dist Post Tibial                           16   13  Occluded                       Prox. Ant. Tibial                           6    3                      12    6    Dist. Ant. Tibial      Occluded                                         25   12    Dist Peroneal       "                                                     18   11         CONCLUSION:  RIGHT LOWER LIMB:  Severe diffuse disease noted throughout the femoral-popliteal arteries with  high grade stenosis vs occlusion of the proximal-distal superficial femoral  artery with reconstitution to the popliteal artery.  Evidence suggestive of TIb/Peroneal occlusive disease. There is a high grade  stenosis vs occlusion of the distal anterior tibial artery.  Ankle/Brachial index: 0.11 which is in the rest pain/tissue loss range (Prior:  0.10)  PVR/ PPG tracings are absent.  Metatarsal pressure is absent  Great toe pressure is absent     LEFT LOWER LIMB:  Severe diffuse disease noted throughout the femoral-popliteal arteries with  high grade stenosis vs occlusion of the proximal-mid superficial femoral artery  with reconstitution in the distal SFA.  Evidence suggestive of TIb/Peroneal occlusive disease. There is high grade  stenosis vs. occlusion of the entire posterior tibial artery.  Ankle/Brachial index:   0.31  which is in the Ischemic range (Prior 0.35  )  PVR/ PPG tracings are dampened.  Metatarsal pressure of 20 mmHg  Great toe pressure of 19 mmHg, below the healing range    Compared to previous study on 4/18/2024, the right SFA occlusion now extends  into the distal portion and there is a decrease in the left LISA and TBI.  Technical findings were given to Kathy Ashford on the floor.      AOIL 11/7/2023  CLINICAL:  Indications:  Evaluate for progression of Aorto-Iliac occlusive disease.  Patient is asymptomatic at this time.  Operative History:  2020-02-26 Cardiac catheterization  Risk Factors  The patient has history of Obesity, HTN, Diabetes (NIDDM (Diet)),  Hyperlipidemia, CKD, PAD, CAD and previous smoking (quit <1yr ago).     FINDINGS:     Unilateral     Impression  PSV (cm/s)  EDV (cm/s)  AP (cm)  TRV (cm)    Sup-Liz Ao                        123                  1.8              Px Inf-David Ao                      64           33      1.4       1.5    Celiac         >70%               310          81                          Right       Impression  PSV (cm/s)  EDV (cm/s)  AP (cm)    Prox JACI                        82           0             Dist JACI    Occluded             0           0             Prox. EIA                       81          16             Dist EIA                       100          23      0.6    Prox Renal  60 - 99%           213          52                Left        PSV (cm/s)  EDV (cm/s)  AP (cm)    Prox JACI            80          21             Dist JACI           155          25             Prox. EIA          165          21             Dist EIA            79          18      0.7       CONCLUSION:  Impression  The visualized portions of the abdominal aorta are patent and normal in caliber  with diffuse disease.  There is an occlusion of the right distal common iliac artery.  Findings suggestive of a >70% stenosis in the celiac trunk.  There is a >60% stenosis of the right renal artery.  The left renal artery was not visualized  The external iliac arteries are patent and normal in caliber bilaterally with  diffuse disease visualized throughout.     RIGHT LOWER LIMB:  Ankle/Brachial index: 0.29 which is in the ischemic disease category (Prior  0.4)  PVR/ PPG tracings are attenuated flat lined.  Metatarsal and great toe pressures unobtainable due to flat line PPG tracing.     LEFT LOWER LIMB:  Ankle/Brachial index: 0.44 which is in the severe disease category (Prior 0.51)  PVR/ PPG tracings are dampened.  Metatarsal pressure of 82mmHg  Great toe pressure of 50mmHg, within the healing range.     Tech note: There is a brachial pressure gradient of 38mmHg right > left.  Compared to previous study on 08/22/202      Physical Exam:    General appearance: alert and oriented, in no acute distress  Neurologic: Grossly normal  Head: Normocephalic, without obvious abnormality, atraumatic  Eyes: PERRLE. EOMI. Non-icteric.    Neck: Bilateral carotid artery bruits  Back: symmetric, no curvature. ROM normal. No CVA tenderness.  Lungs: L base crackles  Chest wall: no tenderness  Heart: regular rate and rhythm, S1, S2 normal, no murmur, click, rub or gallop  Abdomen: soft, non-tender; bowel sounds normal; no masses,  no organomegaly  Skin: R great toe gangrene, otherwise warm and intact  Extremities: R hallux dry gangrene. Mild pedal edema bilaterally. No palpable pulses.     R forefoot tender. Moves toes.       Image 7/17 by me      Image 7/17 by me      Image 7/16    Decreased pulses.   No pulses in feet.    R foot - monophasic AT  L foot - faint, monophasic dopplers        Ashly Marion PA-C  7/17/2024  [facility] [place]

## 2024-07-17 NOTE — CASE MANAGEMENT
Case Management Discharge Planning Note    Patient name Anamaria Parnell  Location /-01 MRN [id number]  : 1945 Date 2024       Current Admission Date: 2024  Current Admission Diagnosis:Sepsis (HCC)   Patient Active Problem List    Diagnosis Date Noted Date Diagnosed    Sepsis (HCC) 2024     Gangrene (HCC) 2024     Diabetic ulcer of toe of right foot associated with diabetes mellitus due to underlying condition, limited to breakdown of skin (HCC) 2024     Carotid stenosis, asymptomatic, right 10/27/2023     Complex renal cyst 10/18/2023     Encounter for follow-up surveillance of urothelial carcinoma of lower urinary tract 10/18/2023     Encounter to discuss test results 10/17/2023     Smoking 2023     Renal cyst 2023     Primary hypertension 2023     Dysarthria 08/15/2023     Stage 3b chronic kidney disease (HCC)      Aortoiliac occlusive disease (HCC) 10/11/2022     History of gastrointestinal stromal tumor (GIST) 2022     Secondary hyperparathyroidism of renal origin (HCC) 2022     Gastrointestinal stromal tumor (GIST) (HCC) 2022     Type 2 diabetes mellitus, without long-term current use of insulin (HCC) 2021     Type 2 diabetes mellitus with diabetic peripheral angiopathy without gangrene (HCC) 2021     Depression, recurrent (HCC) 2021     Stage 4 chronic kidney disease (HCC) 2020     Hypertensive kidney disease with stage 4 chronic kidney disease (HCC) 2020     Cervical radiculopathy 2020     Malignant neoplasm of overlapping sites of bladder (HCC) 2020     Stenosis of left anterior descending artery Mid LAD 50-60% stenosis 2020     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 2020     Pulmonary nodule 7 mm groundglass opacity in the right upper lobe, unchanged since 2020     Atherosclerosis of native arteries  of right leg with ulceration of other part of foot ([facility]) 03/03/2020     Symptomatic carotid artery stenosis, left 03/03/2020     Femoral artery stenosis, right ([facility]) 50-75% stenosis in the common femoral artery. 01/14/2020     Mesenteric artery stenosis ([facility]) 01/14/2020     Positive cardiac stress test  small, mildly severe, partially reversible myocardial perfusion defect of anterior and inferior wall  11/12/2019     Abnormal CT of the chest suspicious for an infectious or inflammatory bronchiolitis. 11/07/2019     Celiac artery stenosis ([facility]) >70% stenosis in the celiac trunk 11/05/2019     Aortoiliac stenosis, left ([facility]) 02/25/2019     Spondylosis of lumbar region without myelopathy or radiculopathy 01/29/2019     Lumbosacral spondylosis without myelopathy 01/29/2019     Statin intolerance 01/22/2019     Lumbar disc herniation 01/22/2019     Herniated lumbar intervertebral disc 01/22/2019     Chronic GERD 12/04/2017     Acute renal failure superimposed on stage 3 chronic kidney disease ([facility]) 12/04/2017     Claudication in peripheral vascular disease ([facility]) 12/04/2017     Gout 12/04/2017     Hx-TIA (transient ischemic attack) 12/04/2017     Hyperlipidemia associated with type 2 diabetes mellitus  ([facility]) 12/04/2017     Hypertension associated with diabetes  ([facility]) 12/04/2017     Osteoarthritis 12/04/2017     Right renal artery stenosis ([facility]) 12/04/2017     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     Hyperlipidemia, unspecified 12/04/2017     Vitamin D deficiency 09/08/2016     Basilar artery stenosis 06/22/2016     Type 2 diabetes mellitus with diabetic nephropathy ([facility]) 12/19/2015     Hypercholesteremia 12/19/2015     Hypertension 12/19/2015     CVA (cerebral vascular accident) ([facility]) 11/09/2015       LOS (days): 1  Geometric Mean LOS (GMLOS) (days): 3.6  Days to GMLOS:2.8     OBJECTIVE:  Risk of Unplanned Readmission Score: 29.8         Current admission status: Inpatient   Preferred Pharmacy:   [id number]  - [place] - [address]  [address]  [place] [address]  Phone: [phone] Fax: [phone]    Primary Care Provider: Ritchie Lawton MD    Primary Insurance: [facility]  Secondary Insurance:     DISCHARGE DETAILS:                                          Other Referral/Resources/Interventions Provided:  Referral Comments: Pt admitted d/t gangrene of R large toe.  Podiatry, vascular, nephrology consults, also PT/OT.  Will continue to follow for treatment team recommendations.         Treatment Team Recommendation: Other (TBD)  Discharge Destination Plan:: Other (TBD)  Transport at Discharge : Other (Comment) (TBD)

## 2024-07-17 NOTE — ASSESSMENT & PLAN NOTE
79 yoF former smoker and vasculopath with DM II,CAD,PAD with chronic right foot wounds now with right great toe gangrene.  Right great toe pain ongoing for months and has been worsening with increased toe pain & black discoloration and foul smell.    Started iv ancef for soft tissue infection  Pain management : tylenol, prn oxy, dilaudid (cautious with use due to her CKD  Podiatry consulted.    Vascular surgery consulted.

## 2024-07-17 NOTE — CASE MANAGEMENT
Case Management Assessment & Discharge Planning Note    Patient name Anamaria Parnell  Location /-01 MRN [id number]  : 1945 Date 2024       Current Admission Date: 2024  Current Admission Diagnosis:Sepsis (HCC)   Patient Active Problem List    Diagnosis Date Noted Date Diagnosed    Sepsis (HCC) 2024     Gangrene (HCC) 2024     Diabetic ulcer of toe of right foot associated with diabetes mellitus due to underlying condition, limited to breakdown of skin (HCC) 2024     Carotid stenosis, asymptomatic, right 10/27/2023     Complex renal cyst 10/18/2023     Encounter for follow-up surveillance of urothelial carcinoma of lower urinary tract 10/18/2023     Encounter to discuss test results 10/17/2023     Smoking 2023     Renal cyst 2023     Primary hypertension 2023     Dysarthria 08/15/2023     Stage 3b chronic kidney disease (HCC)      Aortoiliac occlusive disease (HCC) 10/11/2022     History of gastrointestinal stromal tumor (GIST) 2022     Secondary hyperparathyroidism of renal origin (HCC) 2022     Gastrointestinal stromal tumor (GIST) (HCC) 2022     Type 2 diabetes mellitus, without long-term current use of insulin (HCC) 2021     Type 2 diabetes mellitus with diabetic peripheral angiopathy without gangrene (HCC) 2021     Depression, recurrent (HCC) 2021     Stage 4 chronic kidney disease (HCC) 2020     Hypertensive kidney disease with stage 4 chronic kidney disease (HCC) 2020     Cervical radiculopathy 2020     Malignant neoplasm of overlapping sites of bladder (HCC) 2020     Stenosis of left anterior descending artery Mid LAD 50-60% stenosis 2020     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 2020     Pulmonary nodule 7 mm groundglass opacity in the right upper lobe, unchanged since 2020     Atherosclerosis of  native arteries of right leg with ulceration of other part of foot (Former[facility]) 03/03/2020     Symptomatic carotid artery stenosis, left 03/03/2020     Femoral artery stenosis, right ([facility]) 50-75% stenosis in the common femoral artery. 01/14/2020     Mesenteric artery stenosis ([facility]) 01/14/2020     Positive cardiac stress test  small, mildly severe, partially reversible myocardial perfusion defect of anterior and inferior wall  11/12/2019     Abnormal CT of the chest suspicious for an infectious or inflammatory bronchiolitis. 11/07/2019     Celiac artery stenosis ([facility]) >70% stenosis in the celiac trunk 11/05/2019     Aortoiliac stenosis, left ([facility]) 02/25/2019     Spondylosis of lumbar region without myelopathy or radiculopathy 01/29/2019     Lumbosacral spondylosis without myelopathy 01/29/2019     Statin intolerance 01/22/2019     Lumbar disc herniation 01/22/2019     Herniated lumbar intervertebral disc 01/22/2019     Chronic GERD 12/04/2017     Acute renal failure superimposed on stage 3 chronic kidney disease ([facility]) 12/04/2017     Claudication in peripheral vascular disease ([facility]) 12/04/2017     Gout 12/04/2017     Hx-TIA (transient ischemic attack) 12/04/2017     Hyperlipidemia associated with type 2 diabetes mellitus  ([facility]) 12/04/2017     Hypertension associated with diabetes  ([facility]) 12/04/2017     Osteoarthritis 12/04/2017     Right renal artery stenosis ([facility]) 12/04/2017     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     Hyperlipidemia, unspecified 12/04/2017     Vitamin D deficiency 09/08/2016     Basilar artery stenosis 06/22/2016     Type 2 diabetes mellitus with diabetic nephropathy ([facility]) 12/19/2015     Hypercholesteremia 12/19/2015     Hypertension 12/19/2015     CVA (cerebral vascular accident) ([facility]) 11/09/2015       LOS (days): 1  Geometric Mean LOS (GMLOS) (days): 3.6  Days to GMLOS:2.7     OBJECTIVE:    Risk of Unplanned Readmission Score: 29.86         Current admission status: Inpatient       Preferred  Pharmacy:   [facility]/pharmacy #[id number] - [place], [place] - [address]  [address]  [place] [address]  Phone: [phone] Fax: [phone]    Primary Care Provider: Ritchie Lawton MD    Primary Insurance: [employer] REP  Secondary Insurance:     ASSESSMENT:  Active Health Care Proxies       inder mcfarland Health Care Representative - Son   Primary Phone: [phone] (Mobile)                 Advance Directives  Does patient have a Health Care POA?: Yes  Does patient have Advance Directives?: Yes  Primary Contact: inder mcfarland (Son)  [phone] (Mobile)         Readmission Root Cause  30 Day Readmission: No    Patient Information  Admitted from:: Home  Mental Status: Alert  During Assessment patient was accompanied by: Not accompanied during assessment  Assessment information provided by:: Patient  Primary Caregiver: Self  Caregiver's Name:: inder mcfarland (Son)  [phone] (Mobile)  Caregiver's Relationship to Patient:: Family Member  Support Systems: [facility]  County of Residence: [place]  What city do you live in?: [place], [place]  Home entry access options. Select all that apply.: [facility]  Type of Current Residence: [facility]  Living Arrangements: Lives w/ Son  Is patient a ?: No    Activities of Daily Living Prior to Admission  Functional Status: Independent  Completes ADLs independently?: Yes  Ambulates independently?: No (non ambulatory)  Level of ambulatory dependence: Total Dependent (wh/ch confined)  Does patient use assisted devices?: Yes  Assisted Devices (DME) used: Wheelchair, Shower Chair, Other (Comment) (glucometer)  Does patient currently own DME?: Yes  What DME does the patient currently own?: Shower Chair, Wheelchair, Walker, Straight Cane, Quad Cane, Other (Comment) (glucometer)  Does patient have a history of Outpatient Therapy (PT/OT)?: No  Does the patient have a history of Short-Term Rehab?: Yes  Does patient have a history of HHC?: Yes ([facility] in past, per pt)  Does  patient currently have HHC?: No         Patient Information Continued  Income Source: Other (Comment) (SSI and pension)  Does patient have prescription coverage?: Yes  Does patient receive dialysis treatments?: No  Does patient have a history of substance abuse?: No  Does patient have a history of Mental Health Diagnosis?: No         Means of Transportation  Means of Transport to Appts:: Family transport      Social Determinants of Health (SDOH)      Flowsheet Row Most Recent Value   Housing Stability    In the last 12 months, was there a time when you were not able to pay the mortgage or rent on time? N   In the past 12 months, how many times have you moved where you were living? 0   At any time in the past 12 months, were you homeless or living in a shelter (including now)? N   Transportation Needs    In the past 12 months, has lack of transportation kept you from medical appointments or from getting medications? no   In the past 12 months, has lack of transportation kept you from meetings, work, or from getting things needed for daily living? No   Food Insecurity    Within the past 12 months, you worried that your food would run out before you got the money to buy more. Never true   Within the past 12 months, the food you bought just didn't last and you didn't have money to get more. Never true   Utilities    In the past 12 months has the electric, gas, oil, or water company threatened to shut off services in your home? No            DISCHARGE DETAILS:    Discharge planning discussed with:: pt  Freedom of Choice: Yes  Comments - Freedom of Choice: Met w pt at bedside; introduced self and explained role of CM, including arranging DME, STR, home health, out pt tx.  Advised pt/family that CM will make appropriate referrals based on treatment team recommendations and MD orders, and she can consider recommended plan of care and choose from available vendors.  CM contacted family/caregiver?: No- see comments (pt alert  and oriented adult)  Were Treatment Team discharge recommendations reviewed with patient/caregiver?:  (none at present)          Contacts  Patient Contacts: inder mfcarland (Son)  [phone] (Mobile)  Relationship to Patient:: Family              Other Referral/Resources/Interventions Provided:  Referral Comments: Pt admitted d/t gangrene of R large toe. Podiatry, vascular, nephrology consults, also PT/OT. Will continue to follow for treatment team recommendations.  IV abx, IVF at 75, IV dilaudid, IVF bolus 1L x 1, arterial duplex B/L LEs.  Pt hoping to be d/c'd before weekend.  Pt lives w son who works nights;  has used [facility] in the past.  Will continue to follow pt for post acute needs.               Transport at Discharge : Family

## 2024-07-17 NOTE — ASSESSMENT & PLAN NOTE
Sepsis, likely present on admission, due to leukocytosis, hypothermia with suspected soft tissue infection of toe   On IV Cefazolin  S/p 1L IVF Bolus, now on maintenance fluids  Fu on blood c/s  f/u on urine cultures.   Monitor wbc, any e/o fever

## 2024-07-17 NOTE — PLAN OF CARE
Problem: Potential for Falls  Goal: Patient will remain free of falls  Description: INTERVENTIONS:  - Educate patient/family on patient safety including physical limitations  - Instruct patient to call for assistance with activity   - Consult OT/PT to assist with strengthening/mobility   - Keep Call bell within reach  - Keep bed low and locked with side rails adjusted as appropriate  - Keep care items and personal belongings within reach  - Initiate and maintain comfort rounds  - Make Fall Risk Sign visible to staff    - Apply yellow socks and bracelet for high fall risk patients  - Consider moving patient to room near nurses station  Outcome: Progressing     Problem: GENITOURINARY - ADULT  Goal: Absence of urinary retention  Description: INTERVENTIONS:  - Assess patient’s ability to void and empty bladder  - Monitor I/O  - Bladder scan as needed  - Discuss with physician/AP medications to alleviate retention as needed  - Discuss catheterization for long term situations as appropriate  Outcome: Progressing     Problem: METABOLIC, FLUID AND ELECTROLYTES - ADULT  Goal: Electrolytes maintained within normal limits  Description: INTERVENTIONS:  - Monitor labs and assess patient for signs and symptoms of electrolyte imbalances  - Administer electrolyte replacement as ordered  - Monitor response to electrolyte replacements, including repeat lab results as appropriate  - Instruct patient on fluid and nutrition as appropriate  Outcome: Progressing  Goal: Fluid balance maintained  Description: INTERVENTIONS:  - Monitor labs   - Monitor I/O and WT  - Instruct patient on fluid and nutrition as appropriate  - Assess for signs & symptoms of volume excess or deficit  Outcome: Progressing  Goal: Glucose maintained within target range  Description: INTERVENTIONS:  - Monitor Blood Glucose as ordered  - Assess for signs and symptoms of hyperglycemia and hypoglycemia  - Administer ordered medications to maintain glucose within  target range  - Assess nutritional intake and initiate nutrition service referral as needed  Outcome: Progressing     Problem: SKIN/TISSUE INTEGRITY - ADULT  Goal: Incision(s), wounds(s) or drain site(s) healing without S/S of infection  Description: INTERVENTIONS  - Assess and document dressing, incision, wound bed, drain sites and surrounding tissue  - Provide patient and family education  - Perform skin care/dressing changes   Outcome: Progressing  Goal: Pressure injury heals and does not worsen  Description: Interventions:  - Implement low air loss mattress or specialty surface (Criteria met)  - Apply silicone foam dressing  - Instruct/assist with weight shifting every 2 minutes when in chair   - Limit chair time to 2 hour intervals  - Use special pressure reducing interventions such as 2 when in chair   - Apply fecal or urinary incontinence containment device   - Perform passive or active ROM every 2  - Turn and reposition patient & offload bony prominences every 2 hours   - Utilize friction reducing device or surface for transfers   - Consider consults to  interdisciplinary teams such as 2  - Use incontinent care products after each incontinent episode such as 2  - Consider nutrition services referral as needed  Outcome: Progressing     Problem: PAIN - ADULT  Goal: Verbalizes/displays adequate comfort level or baseline comfort level  Description: Interventions:  - Encourage patient to monitor pain and request assistance  - Assess pain using appropriate pain scale  - Administer analgesics based on type and severity of pain and evaluate response  - Implement non-pharmacological measures as appropriate and evaluate response  - Consider cultural and social influences on pain and pain management  - Notify physician/advanced practitioner if interventions unsuccessful or patient reports new pain  Outcome: Progressing     Problem: INFECTION - ADULT  Goal: Absence or prevention of progression during  hospitalization  Description: INTERVENTIONS:  - Assess and monitor for signs and symptoms of infection  - Monitor lab/diagnostic results  - Monitor all insertion sites, i.e. indwelling lines, tubes, and drains  - Monitor endotracheal if appropriate and nasal secretions for changes in amount and color  - [place] appropriate cooling/warming therapies per order  - Administer medications as ordered  - Instruct and encourage patient and family to use good hand hygiene technique  - Identify and instruct in appropriate isolation precautions for identified infection/condition  Outcome: Progressing     Problem: SAFETY ADULT  Goal: Patient will remain free of falls  Description: INTERVENTIONS:  - Educate patient/family on patient safety including physical limitations  - Instruct patient to call for assistance with activity   - Consult OT/PT to assist with strengthening/mobility   - Keep Call bell within reach  - Keep bed low and locked with side rails adjusted as appropriate  - Keep care items and personal belongings within reach  - Initiate and maintain comfort rounds  - Make Fall Risk Sign visible to staff    - Apply yellow socks and bracelet for high fall risk patients  - Consider moving patient to room near nurses station  Outcome: Progressing     Problem: Prexisting or High Potential for Compromised Skin Integrity  Goal: Skin integrity is maintained or improved  Description: INTERVENTIONS:  - Identify patients at risk for skin breakdown  - Assess and monitor skin integrity  - Assess and monitor nutrition and hydration status  - Monitor labs   - Assess for incontinence   - Turn and reposition patient  - Assist with mobility/ambulation  - Relieve pressure over bony prominences  - Avoid friction and shearing  - Provide appropriate hygiene as needed including keeping skin clean and dry  - Evaluate need for skin moisturizer/barrier cream  - Collaborate with interdisciplinary team   - Patient/family teaching  - Consider  wound care consult   Outcome: Progressing

## 2024-07-17 NOTE — ASSESSMENT & PLAN NOTE
CLTI with toe gangrene  Aortoiliac occlusive disease  Atherosclerosis lower extremities with ulceration  Diabetes with neuropathy  CKD IV    79 yoF former smoker and vasculopathy with obesity, CKD IV, DM II, HLD, HTN, CAD, renal artery stenosis, mesenteric artery stenosis, AIOD/PAD with chronic right foot wounds, L MCA and PCA CVA s/p L TCAR 9/7/23 ([facility]). Patient with chronic right lower extremity chronic limb threatening ischemia with right great toe gangrene.  Right great toe injury ongoing for months and has been demarcating.  Patient now presents due to increased toe pain. She has been placed on cephalexin for possible soft tissue infection. Vascular surgery is asked to evaluate.    Data:  CBC 14.69 (16.56), HGB 9.7,   BUN/creat 31/1.79,. eGFR 26 (1.4-2.2)  PT/INR 15.6/1.17  A1c 5.9  Blood cultures pending  Urine culture pending    Imaging:  -TEA 7/16/24:    R 0.11/--/--; Severe fem-pop disease c HG stenosis/occlusion prox-distal SFA c recon popliteal. Tib/peroneal occlusive disease. Tib/peroneal occlusive disease. Distal AT artery occlusion.    L 0.31/20/19; Severe diffuse fem-pop disease with HG v occlusion prox-mid SFA with recon distal SFA. Tib/Peronea occlusive disease. Entire PT artery HG v occlusion.     C/w 4/2024, R SFA occlusion now extends into the distal SFA and decreased L LISA/TBI    -AOIL 11/7/2023:  Occlusion R distal JACI.     >70% celiac trunk. >60% R renal. L renal not visualized.     LISA R 0.29/--/-- with flat-lined PVR/PPG tracings. L 0.44/82/50      Plan: Patient with chronic limb threatening ischemia with chronic, advanced calcific atherosclerotic occlusive disease and R hallux dry gangrene who presents for worsening R toe gangrene and pain.     -Severe multilevel calcified atherosclerotic occlusive disease involving the right iliac system, right common femoral, profundofemoral and entire SFA, as well as tibioperoneal disease leading to poor perfusion in the foot.  LISA extremely low  at 0.11 with flatlined PVR/PPG tracings and poor perfusion to the foot which flatlined metatarsal and great toe pressures.  -Recurrent R foot cellulitis which appears improving on antibiotics with cephalexin  -Stable, but worsening R hallux dry gangrene which is much worse than 1 month ago; foot otherwise does not appear to be acutely infected; grossly motor-sensory intact.   -Trend WBC and follow-up blood cultures  -Continue with aspirin and ticagrelor  -Will engage nephrology to help optimize her and provide rec's for pre-hydration for CTA a/p with runoff to further delineate her anatomy and decide upon vascular options.  -Pain and medical management as per Slim/admitting team  -Local wound care as per podiatry  -Will continue to monitor with you and further recommendations forthcoming hospital course and testing  -Reviewed with Dr. Reynaga and plan discussed with SIM and family

## 2024-07-17 NOTE — PROGRESS NOTES
Called to the patient due to a temperature of 95.3 rectally.  Was admitted for possible gangrene of her toe.  Hemodynamically stable otherwise wise.  Sats are good.  95%.  Due to her low temperature on and do a septic workup to include CBC, urine culture, blood culture and chest x-ray.  Will [place] back with results.

## 2024-07-17 NOTE — PROGRESS NOTES
Called to see patient secondary to hypothermia.  Temperature 95 rectally.  Hemodynamically stable otherwise.  Chest x-ray looks good blood cultures slightly up awaiting urine culture no obvious signs of infection.  Currently on a first generation cephalosporin for presumed soft tissue infection of her toe.  Will continue that for the time being await culture results.

## 2024-07-17 NOTE — PLAN OF CARE
Problem: OCCUPATIONAL THERAPY ADULT  Goal: Performs self-care activities at highest level of function for planned discharge setting.  See evaluation for individualized goals.  Description: Treatment Interventions: ADL retraining, Functional transfer training, UE strengthening/ROM, Endurance training, Patient/family training, Equipment evaluation/education, Compensatory technique education, Continued evaluation, Energy conservation          See flowsheet documentation for full assessment, interventions and recommendations.   Note: Limitation: Decreased ADL status, Decreased UE strength, Decreased endurance, Decreased self-care trans, Decreased high-level ADLs  Prognosis: Good  Assessment: Patient is a 79 y.o. female seen for OT evaluation s/p admit to [facility] on 7/16/2024 w/Sepsis ([facility]). Commorbidities affecting patient's functional performance at time of assessment include: Right toe pain, gangrene, Dysarthria, aortoiliac occlusive disorder , polyneuropathy, type 2 DM,  acute renal failure, stage 3 CKD, Basilar artery stenosis. Orders placed for OT evaluation and treatment.  Performed at least two patient identifiers during session including name and wristband.  Prior to admission, Patient was independent with basic self care and stand pivot transfers to all surfaces. Lives with family in a one story house with ramped entrance. Family assists with IADLs. Personal factors affecting patient at time of initial evaluation include: difficulty performing ADLs and difficulty performing IADLs. Upon evaluation, patient requires minimal  assist for UB ADLs, moderate assist for LB ADLs.   Occupational performance is affected by the following deficits: decreased functional use of BUEs, decreased muscle strength, degenerative arthritic joint changes, dynamic sit/ stand balance deficit with poor standing tolerance time for self care and functional mobility, decreased activity tolerance, increased pain, and  orthopedic restrictions.  Patient to benefit from continued Occupational Therapy treatment while in the hospital to address deficits as defined above and maximize level of functional independence with ADLs and functional mobility. Occupational Performance areas to address include: bathing/ shower, dressing, toilet hygiene, transfer to all surfaces, functional mobility, emergency response, health maintenance, IADLs: safety procedures, and Leisure Participation. From OT standpoint, recommendation at time of d/c would be Level 2.     Rehab Resource Intensity Level, OT: II (Moderate Resource Intensity)

## 2024-07-17 NOTE — OCCUPATIONAL THERAPY NOTE
Occupational Therapy Evaluation        Patient Name: Anamaria Parnell  Today's Date: 7/17/2024 07/17/24 0940   OT Last Visit   OT Visit Date 07/17/24   Note Type   Note type Evaluation   Pain Assessment   Pain Assessment Tool 0-10   Pain Score No Pain   Restrictions/Precautions   Weight Bearing Precautions Per Order   (maintained NWB R LE this session pending podiatry consult)   Braces or Orthoses Other (Comment)  (post op shoe for R foot for protection)   Other Precautions Chair Alarm;Bed Alarm;Fall Risk;Pain   Home Living   Type of Home House   Home Layout One level;Ramped entrance;Performs ADLs on one level;Able to live on main level with bedroom/bathroom   Bathroom Shower/Tub Walk-in shower   Bathroom Toilet Standard   Bathroom Equipment Grab bars in shower;Grab bars around toilet;Shower chair;Commode   Bathroom Accessibility Accessible via wheelchair   Home Equipment Walker;Cane;Wheelchair-electric   Additional Comments stand pivot to and from WC to toilet and shower   Prior Function   Level of [place] Independent with ADLs;Independent with functional mobility;Independent with IADLS   Lives With Son   Receives Help From Family   IADLs Independent with meal prep;Family/Friend/Other provides medication management;Family/Friend/Other provides transportation   Falls in the last 6 months 0   Vocational Retired   Lifestyle   Autonomy Patient was independent with basic self care and stand pivot transfers to all surfaces. Lives with family in a one story house with ramped entrance. Family assists with IADLs.   Reciprocal Relationships Supportive Family   Service to Others Retired   General   Family/Caregiver Present Yes   ADL   Where Assessed Chair   Eating Assistance 6  Modified independent   Grooming Assistance 5  Supervision/Setup   UB Bathing Assistance 5  Supervision/Setup   LB Bathing Assistance 3  Moderate Assistance   UB Dressing Assistance 5  Supervision/Setup   LB Dressing Assistance 3   Moderate Assistance   Toileting Assistance  3  Moderate Assistance   Functional Assistance 4  Minimal Assistance   Bed Mobility   Additional Comments received patient OOB to Recliner/ completed transfers -recliner to/ from  bed, assist of 1 using armrests for support and verbal cues for technique.   Transfers   Sit to Stand 4  Minimal assistance   Additional items Assist x 1;Increased time required;Verbal cues;Armrests   Stand to Sit 4  Minimal assistance   Functional Mobility   Functional Mobility 4  Minimal assistance   Additional Comments assist of 1 for stand pivot transfers/ verbal cues to maintain NWB to R foot   Additional items   (armrests)   Balance   Static Sitting Fair +   Dynamic Sitting Fair   Static Standing Poor +   Dynamic Standing Poor   Activity Tolerance   Activity Tolerance Patient limited by fatigue   RUE Assessment   RUE Assessment X   RUE Strength   RUE Overall Strength Deficits  (3+/5)   LUE Assessment   LUE Assessment X   LUE Strength   LUE Overall Strength Deficits  (3+/5)   Hand Function   Gross Motor Coordination Functional   Fine Motor Coordination Functional   Sensation   Light Touch No apparent deficits  (BUEs)   Proprioception   Proprioception No apparent deficits  (BUEs)   Vision-Basic Assessment   Current Vision Wears glasses all the time   Psychosocial   Psychosocial (WDL) WDL   Cognition   Overall Cognitive Status WFL   Arousal/Participation Alert;Responsive;Cooperative   Attention Within functional limits   Orientation Level Oriented X4   Memory Within functional limits   Following Commands Follows all commands and directions without difficulty   Assessment   Limitation Decreased ADL status;Decreased UE strength;Decreased endurance;Decreased self-care trans;Decreased high-level ADLs   Prognosis Good   Assessment Patient is a 79 y.o. female seen for OT evaluation s/p admit to [facility] on 7/16/2024 w/Sepsis ([facility]). Commorbidities affecting patient's functional  performance at time of assessment include: Right toe pain, gangrene, Dysarthria, aortoiliac occlusive disorder , polyneuropathy, type 2 DM,  acute renal failure, stage 3 CKD, Basilar artery stenosis. Orders placed for OT evaluation and treatment.  Performed at least two patient identifiers during session including name and wristband.  Prior to admission, Patient was independent with basic self care and stand pivot transfers to all surfaces. Lives with family in a one story house with ramped entrance. Family assists with IADLs. Personal factors affecting patient at time of initial evaluation include: difficulty performing ADLs and difficulty performing IADLs. Upon evaluation, patient requires minimal  assist for UB ADLs, moderate assist for LB ADLs.   Occupational performance is affected by the following deficits: decreased functional use of BUEs, decreased muscle strength, degenerative arthritic joint changes, dynamic sit/ stand balance deficit with poor standing tolerance time for self care and functional mobility, decreased activity tolerance, increased pain, and orthopedic restrictions.  Patient to benefit from continued Occupational Therapy treatment while in the hospital to address deficits as defined above and maximize level of functional independence with ADLs and functional mobility. Occupational Performance areas to address include: bathing/ shower, dressing, toilet hygiene, transfer to all surfaces, functional mobility, emergency response, health maintenance, IADLs: safety procedures, and Leisure Participation. From OT standpoint, recommendation at time of d/c would be Level 2.   Plan   Treatment Interventions ADL retraining;Functional transfer training;UE strengthening/ROM;Endurance training;Patient/family training;Equipment evaluation/education;Compensatory technique education;Continued evaluation;Energy conservation   Goal Expiration Date 07/31/24   OT Frequency 3-5x/wk   Discharge Recommendation    Rehab Resource Intensity Level, OT II (Moderate Resource Intensity)   AM-PAC Daily Activity Inpatient   Lower Body Dressing 2   Bathing 2   Toileting 3   Upper Body Dressing 3   Grooming 3   Eating 4   Daily Activity Raw Score 17   Daily Activity Standardized Score (Calc for Raw Score >=11) 37.26   AM-PAC Applied Cognition Inpatient   Following a Speech/Presentation 4   Understanding Ordinary Conversation 4   Taking Medications 4   Remembering Where Things Are Placed or Put Away 4   Remembering List of 4-5 Errands 4   Taking Care of Complicated Tasks 4   Applied Cognition Raw Score 24   Applied Cognition Standardized Score 62.21   Barthel Index   Feeding 10   Bathing 0   Grooming Score 0   Dressing Score 5   Bladder Score 10   Bowels Score 10   Toilet Use Score 5   Transfers (Bed/Chair) Score 10   Mobility (Level Surface) Score 0   Stairs Score 5   Barthel Index Score 55         1 - Patient will verbalize and demonstrate use of energy conservation/ deep breathing technique and work simplification skills during functional activity with no verbal cues.    2 - Patient will verbalize and demonstrate good body mechanics and joint protection techniques during  ADLs/ IADLs with no verbal cues.    3 - Patient will increase OOB/ sitting tolerance to 2-4 hours per day for increased participation in self care and leisure tasks with no s/s of exertion.    4 - Patient will increase standing tolerance time to 5  minutes with unilateral UE support to complete sink level ADLs@ mod I level.    5 - Patient will increase sitting tolerance at edge of bed to 20 minutes to complete UB ADLs @ set up assist level.    6 - Patient will transfer bed to Chair / toilet at Set up assist level with AD as indicated.     7 - Patient will complete UB ADLs with set up assist.     8 - Patient will complete LB ADLs with min assist with the use of adaptive equipment.     9 - Patient will complete toileting hygiene with set up assist/ supervision for  thoroughness    10 - Patient/ Family  will demonstrate competency with UE Home Exercise Program.

## 2024-07-17 NOTE — ASSESSMENT & PLAN NOTE
Lab Results   Component Value Date    EGFR 26 07/17/2024    EGFR 21 07/16/2024    EGFR 41 04/19/2024    CREATININE 1.79 (H) 07/17/2024    CREATININE 2.19 (H) 07/16/2024    CREATININE 1.27 04/19/2024   Admitted with acute on chronic kidney disease.  (Baseline  1.2-1.8)  Admitted with creatinine 2.19.   On gentle hydration. Improving, creatinine 1.79 this am  Nephrology consulted in anticipation of vascular procedure

## 2024-07-17 NOTE — ASSESSMENT & PLAN NOTE
PAD with chronic right foot wounds now with right great toe gangrene.    Vascular surgery consuted.  On asa / brillinta / statin for now

## 2024-07-17 NOTE — PROGRESS NOTES
Pending sale to [facility]  Progress Note  Name: Anamaria Parnell I  MRN: [id number]  Unit/Bed#: -01 I Date of Admission: 7/16/2024   Date of Service: 7/17/2024 I Hospital Day: 1    Assessment & Plan   * Sepsis (HCC)  Assessment & Plan  Sepsis, likely present on admission, due to leukocytosis, hypothermia with suspected soft tissue infection of toe   On IV Cefazolin  S/p 1L IVF Bolus, now on maintenance fluids  Fu on blood c/s  f/u on urine cultures.   Monitor wbc, any e/o fever        Gangrene (HCC)  Assessment & Plan  79 yoF former smoker and vasculopath with DM II,CAD,PAD with chronic right foot wounds now with right great toe gangrene.  Right great toe pain ongoing for months and has been worsening with increased toe pain & black discoloration and foul smell.    Started iv ancef for soft tissue infection  Pain management : tylenol, prn oxy, dilaudid (cautious with use due to her CKD  Podiatry consulted.    Vascular surgery consulted.      Acute renal failure superimposed on stage 3 chronic kidney disease (HCC)  Assessment & Plan  Lab Results   Component Value Date    EGFR 26 07/17/2024    EGFR 21 07/16/2024    EGFR 41 04/19/2024    CREATININE 1.79 (H) 07/17/2024    CREATININE 2.19 (H) 07/16/2024    CREATININE 1.27 04/19/2024   Admitted with acute on chronic kidney disease.  (Baseline  1.2-1.8)  Admitted with creatinine 2.19.   On gentle hydration. Improving, creatinine 1.79 this am  Nephrology consulted in anticipation of vascular procedure     Aortoiliac occlusive disease (HCC)  Assessment & Plan  PAD with chronic right foot wounds now with right great toe gangrene.    Vascular surgery consuted.  On asa / brillinta / statin for now    Type 2 diabetes mellitus with diabetic nephropathy (HCC)  Assessment & Plan  Lab Results   Component Value Date    HGBA1C 5.9 (H) 07/09/2024       Recent Labs     07/17/24  0628   POCGLU 135       Blood Sugar Average: Last 72 hrs:  (P) 135  Hold home Tradjenta during  hospitalization  Patient placed on sliding scale coverage with ACHS checks  A1c notes good control of blood sugar [5.9]  Hypoglycemia protocol.    Basilar artery stenosis  Assessment & Plan  Patient with a history of carotid surgery.    Follows with Dr. Gallo           VTE Pharmacologic Prophylaxis: VTE Score: 5 heparin    Mobility:   Basic Mobility Inpatient Raw Score: 16  JH-HLM Goal: 5: Stand one or more mins  JH-HLM Achieved: 5: Stand (1 or more minutes)  JH-HLM Goal NOT achieved. Continue with multidisciplinary rounding and encourage appropriate mobility to improve upon JH-HLM goals.    Patient Centered Rounds: I performed bedside rounds with nursing staff today.   Discussions with Specialists or Other Care Team Provider: cm    Education and Discussions with Family / Patient: Updated  (son) at bedside.    Total Time Spent on Date of Encounter in care of patient: 35 mins. This time was spent on one or more of the following: performing physical exam; counseling and coordination of care; obtaining or reviewing history; documenting in the medical record; reviewing/ordering tests, medications or procedures; communicating with other healthcare professionals and discussing with patient's family/caregivers.    Current Length of Stay: 1 day(s)  Current Patient Status: Inpatient   Certification Statement: The patient will continue to require additional inpatient hospital stay due to needs podiatry/ vascular eval  Discharge Plan: undetermined    Code Status: Level 3 - DNAR and DNI    Subjective:   Reports pain is much better controlled  No fevers    Objective:     Vitals:   Temp (24hrs), Av °F (36.1 °C), Min:95.3 °F (35.2 °C), Max:98 °F (36.7 °C)    Temp:  [95.3 °F (35.2 °C)-98 °F (36.7 °C)] 98 °F (36.7 °C)  HR:  [69-89] 69  Resp:  [16-18] 18  BP: (119-158)/(48-71) 126/51  SpO2:  [93 %-98 %] 96 %  Body mass index is 30.1 kg/m².     Input and Output Summary (last 24 hours):     Intake/Output Summary (Last  24 hours) at 7/17/2024 1049  Last data filed at 7/17/2024 0849  Gross per 24 hour   Intake 145 ml   Output 625 ml   Net -480 ml       Physical Exam:   Physical Exam  Constitutional:       General: She is not in acute distress.     Appearance: She is obese. She is not toxic-appearing.   HENT:      Mouth/Throat:      Mouth: Mucous membranes are moist.      Pharynx: Oropharynx is clear.   Cardiovascular:      Rate and Rhythm: Normal rate and regular rhythm.      Pulses: Normal pulses.   Pulmonary:      Effort: Pulmonary effort is normal. No respiratory distress.      Breath sounds: Normal breath sounds.   Abdominal:      General: Abdomen is flat. Bowel sounds are normal.      Palpations: Abdomen is soft.   Skin:     Comments: Right great toe in gangrenous  No DP can be palpated   Neurological:      Mental Status: She is alert and oriented to person, place, and time.          Additional Data:     Labs:  Results from last 7 days   Lab Units 07/17/24  0512 07/17/24  0156 07/16/24  1738   WBC Thousand/uL 14.69*   < > 14.08*   HEMOGLOBIN g/dL 9.7*   < > 11.1*   HEMATOCRIT % 30.4*   < > 33.5*   PLATELETS Thousands/uL 342   < > 397*   SEGS PCT %  --   --  82*   LYMPHO PCT %  --   --  4*   MONO PCT %  --   --  10   EOS PCT %  --   --  2    < > = values in this interval not displayed.     Results from last 7 days   Lab Units 07/17/24  0512 07/16/24  1738   SODIUM mmol/L 137 140   POTASSIUM mmol/L 3.9 4.4   CHLORIDE mmol/L 106 104   CO2 mmol/L 20* 21   BUN mg/dL 31* 35*   CREATININE mg/dL 1.79* 2.19*   ANION GAP mmol/L 11 15*   CALCIUM mg/dL 7.6* 8.8   ALBUMIN g/dL  --  3.8   TOTAL BILIRUBIN mg/dL  --  0.38   ALK PHOS U/L  --  96   ALT U/L  --  4*   AST U/L  --  7*   GLUCOSE RANDOM mg/dL 135 106     Results from last 7 days   Lab Units 07/16/24  1738   INR  1.17     Results from last 7 days   Lab Units 07/17/24  0628   POC GLUCOSE mg/dl 135               Lines/Drains:  Invasive Devices       Peripheral Intravenous Line  Duration              Peripheral IV 07/16/24 Left Antecubital <1 day                          Imaging: Reviewed radiology reports from this admission including: ultrasound(s)    Recent Cultures (last 7 days):   Results from last 7 days   Lab Units 07/17/24  0156   BLOOD CULTURE  Received in Microbiology Lab. Culture in Progress.       Last 24 Hours Medication List:   Current Facility-Administered Medications   Medication Dose Route Frequency Provider Last Rate    acetaminophen  975 mg Oral Q8H JAVIER Des Longoria MD      allopurinol  100 mg Oral Daily Des Longoria MD      amLODIPine  5 mg Oral Daily Des Longoria MD      aspirin  81 mg Oral Daily Des Longoria MD      cefazolin  1,000 mg Intravenous Q12H Des Longoria MD 1,000 mg (07/17/24 0845)    cinacalcet  30 mg Oral Every Other Day Des Longoria MD      cloNIDine  1 patch Transdermal Weekly Des Longoria MD      escitalopram  20 mg Oral Daily Des Longoria MD      heparin (porcine)  5,000 Units Subcutaneous Q8H [facility] Des Longoria MD      HYDROmorphone  0.2 mg Intravenous Q6H PRN Des Longoria MD      insulin lispro  1-5 Units Subcutaneous TID AC Grzegorz Voss MD      labetalol  300 mg Oral Q12H [facility] Des Longoria MD      lidocaine  1 patch Topical Daily Des Longoria MD      losartan  100 mg Oral Daily Des Longoria MD      ondansetron  4 mg Intravenous Q6H PRN Des Longoria MD      oxyCODONE  5 mg Oral Q4H PRN Des Longoria MD      oxyCODONE  2.5 mg Oral Q4H PRN Des Longoria MD      pravastatin  80 mg Oral Daily With Dinner Des Longoria MD      sodium chloride  75 mL/hr Intravenous Continuous Des Longoria MD 75 mL/hr (07/16/24 2148)    ticagrelor  90 mg Oral Q12H JAVIER Des Longoria MD      traZODone  50 mg Oral HS Des Longoria MD          Today, Patient Was Seen By: Grzegorz Voss MD    **Please Note: This note may have been constructed using a voice recognition  system.**

## 2024-07-17 NOTE — PLAN OF CARE
Problem: Potential for Falls  Goal: Patient will remain free of falls  Description: INTERVENTIONS:  - Educate patient/family on patient safety including physical limitations  - Instruct patient to call for assistance with activity   - Consult OT/PT to assist with strengthening/mobility   - Keep Call bell within reach  - Keep bed low and locked with side rails adjusted as appropriate  - Keep care items and personal belongings within reach  - Initiate and maintain comfort rounds  - Make Fall Risk Sign visible to staff  - Offer Toileting every  Hours, in advance of need  - Initiate/Maintain alarm  - Obtain necessary fall risk management equipment:   - Apply yellow socks and bracelet for high fall risk patients  - Consider moving patient to room near nurses station  7/16/2024 2234 by Fariba Hernández RN  Outcome: Progressing  7/16/2024 2233 by Fariba Hernández RN  Reactivated     Problem: GENITOURINARY - ADULT  Goal: Absence of urinary retention  Description: INTERVENTIONS:  - Assess patient’s ability to void and empty bladder  - Monitor I/O  - Bladder scan as needed  - Discuss with physician/AP medications to alleviate retention as needed  - Discuss catheterization for long term situations as appropriate  7/16/2024 2234 by Fariba Hernández RN  Outcome: Progressing  7/16/2024 2233 by Fariba Hernández RN  Reactivated     Problem: METABOLIC, FLUID AND ELECTROLYTES - ADULT  Goal: Electrolytes maintained within normal limits  Description: INTERVENTIONS:  - Monitor labs and assess patient for signs and symptoms of electrolyte imbalances  - Administer electrolyte replacement as ordered  - Monitor response to electrolyte replacements, including repeat lab results as appropriate  - Instruct patient on fluid and nutrition as appropriate  7/16/2024 2234 by Fariba Hernández RN  Outcome: Progressing  7/16/2024 2233 by Fariba Hernández RN  Reactivated  Goal: Fluid balance maintained  Description: INTERVENTIONS:  - Monitor labs   - Monitor I/O and WT  - Instruct  patient on fluid and nutrition as appropriate  - Assess for signs & symptoms of volume excess or deficit  7/16/2024 2234 by Fariba Hernández RN  Outcome: Progressing  7/16/2024 2233 by Fariba Hernández RN  Reactivated  Goal: Glucose maintained within target range  Description: INTERVENTIONS:  - Monitor Blood Glucose as ordered  - Assess for signs and symptoms of hyperglycemia and hypoglycemia  - Administer ordered medications to maintain glucose within target range  - Assess nutritional intake and initiate nutrition service referral as needed  7/16/2024 2234 by Fariba Hernández RN  Outcome: Progressing  7/16/2024 2233 by Fariba Hernández RN  Reactivated     Problem: SKIN/TISSUE INTEGRITY - ADULT  Goal: Incision(s), wounds(s) or drain site(s) healing without S/S of infection  Description: INTERVENTIONS  - Assess and document dressing, incision, wound bed, drain sites and surrounding tissue  - Provide patient and family education  - Perform skin care/dressing changes every   7/16/2024 2234 by Fariba Hernández RN  Outcome: Progressing  7/16/2024 2233 by Fariba Hernández RN  Reactivated  Goal: Pressure injury heals and does not worsen  Description: Interventions:  - Implement low air loss mattress or specialty surface (Criteria met)  - Apply silicone foam dressing  - Instruct/assist with weight shifting every  minutes when in chair   - Limit chair time to  hour intervals  - Use special pressure reducing interventions such as  when in chair   - Apply fecal or urinary incontinence containment device   - Perform passive or active ROM every   - Turn and reposition patient & offload bony prominences every  hours   - Utilize friction reducing device or surface for transfers   - Consider consults to  interdisciplinary teams such as   - Use incontinent care products after each incontinent episode such as  - Consider nutrition services referral as needed  7/16/2024 2234 by Fariba Hernández RN  Outcome: Progressing  7/16/2024 2233 by Fariba Hernández RN  Reactivated      Problem: PAIN - ADULT  Goal: Verbalizes/displays adequate comfort level or baseline comfort level  Description: Interventions:  - Encourage patient to monitor pain and request assistance  - Assess pain using appropriate pain scale  - Administer analgesics based on type and severity of pain and evaluate response  - Implement non-pharmacological measures as appropriate and evaluate response  - Consider cultural and social influences on pain and pain management  - Notify physician/advanced practitioner if interventions unsuccessful or patient reports new pain  7/16/2024 2234 by Fariba Hernández RN  Outcome: Progressing  7/16/2024 2233 by Fariba Hernández RN  Reactivated     Problem: INFECTION - ADULT  Goal: Absence or prevention of progression during hospitalization  Description: INTERVENTIONS:  - Assess and monitor for signs and symptoms of infection  - Monitor lab/diagnostic results  - Monitor all insertion sites, i.e. indwelling lines, tubes, and drains  - Monitor endotracheal if appropriate and nasal secretions for changes in amount and color  - [place] appropriate cooling/warming therapies per order  - Administer medications as ordered  - Instruct and encourage patient and family to use good hand hygiene technique  - Identify and instruct in appropriate isolation precautions for identified infection/condition  7/16/2024 2234 by Fariba Hernández RN  Outcome: Progressing  7/16/2024 2233 by Fariba Hernández RN  Reactivated     Problem: SAFETY ADULT  Goal: Patient will remain free of falls  Description: INTERVENTIONS:  - Educate patient/family on patient safety including physical limitations  - Instruct patient to call for assistance with activity   - Consult OT/PT to assist with strengthening/mobility   - Keep Call bell within reach  - Keep bed low and locked with side rails adjusted as appropriate  - Keep care items and personal belongings within reach  - Initiate and maintain comfort rounds  - Make Fall Risk Sign visible to staff  -  Offer Toileting every  Hours, in advance of need  - Initiate/Maintain alarm  - Obtain necessary fall risk management equipment:   - Apply yellow socks and bracelet for high fall risk patients  - Consider moving patient to room near nurses station  7/16/2024 2234 by Fariba Hernández RN  Outcome: Progressing  7/16/2024 2233 by Fariba Henrández RN  Reactivated

## 2024-07-17 NOTE — CONSULTS
NEPHROLOGY CONSULTATION NOTE    Patient: Anamaria Parnell               Sex: female          DOA: 7/16/2024  5:12 PM   YOB: 1945        Age:  79 y.o.        LOS:  LOS: 1 day     REFERRING PHYSICIAN: Dr. Voss    REASON FOR THE REFERRAL / CONSULTATION: RICARDO on CKD stage IIIb    DATE OF CONSULTATION / SERVICE: 7/17/2024    ADMISSION DIAGNOSIS: Sepsis (HCC)     CHIEF COMPLAINT     Right toe tenderness    HPI     This is a 79 years old female with past medical history of chronic kidney disease stage IIIb/IV, diabetes mellitus type 2, CVA, dyslipidemia, carotid stenosis, obesity, hypertension who presented to our facility with right toe gangrene.  Upon presentation patient vital signs and lab parameters suggested the presence of sepsis including hypothermia, leukocytosis and endorgan damage in the form of RICARDO.  Patient was initiated on IV antibiotic in the form of cefazolin and administered IV fluids.  Currently patient is being evaluated by vascular surgery.  A lower extremity arterial duplex study revealed significant stenosis.     Currently patient denies nausea, vomiting, headache, dizziness, abdominal pain, constipation or rash.    PAST MEDICAL HISTORY     Past Medical History:   Diagnosis Date    Aphasia as late effect of cerebrovascular accident (CVA) 08/16/2023    Arthritis     Chronic kidney disease     Diabetes mellitus (HCC)     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/20/2021    Endometriosis     High cholesterol     History of left common carotid artery stent placement 10/27/2023    Hypertension     Kidney disease, chronic, stage III (moderate, EGFR 30-59 ml/min) (Formerly [facility])     Lumbar disc herniation     Median arcuate ligament syndrome (Formerly [facility]) 11/05/2019    Neuropathy     Obesity (BMI 30-39.9) 12/04/2017    Obesity, morbid (HCC) 01/20/2021    Peripheral vascular disease (Formerly [facility])     Pneumonia     Shoulder injury     left    Spinal stenosis     Stroke (Formerly [facility]) 2015    Memory  loss       PAST SURGICAL HISTORY     Past Surgical History:   Procedure Laterality Date    CARDIAC CATHETERIZATION      CAROTID STENT Left 9/7/2023    Procedure: L TCAR;  Surgeon: Nicole Gallo MD;  [facility];  Service: Vascular    COLONOSCOPY      FL RETROGRADE PYELOGRAM  4/6/2020    FRACTURE SURGERY Right     ankle    IR CAROTID STENT  9/7/2023    OVARIAN CYST SURGERY      CO CYSTO BLADDER W/URETERAL CATHETERIZATION Bilateral 4/6/2020    Procedure: CYSTOSCOPY WITH RETROGRADE PYELOGRAM;  Surgeon: Robert Mitchell MD;  [facility];  Service: Urology    CO CYSTO W/INSERT URETERAL STENT Right 4/6/2020    Procedure: INSERTION STENT URETERAL;  Surgeon: Robert Mitchell MD;  [facility];  Service: Urology    CO CYSTO W/REMOVAL OF TUMORS SMALL N/A 4/6/2020    Procedure: TRANSURETHRAL RESECTION OF BLADDER TUMOR (TURBT);  Surgeon: Robert Mitchell MD;  [facility];  Service: Urology    ROTATOR CUFF REPAIR Left     TONSILLECTOMY         ALLERGIES     Allergies   Allergen Reactions    Fenofibrate Other (See Comments)      blood in urine  hx  Kidney Failure    Colesevelam Other (See Comments)      leg pains    Colestipol Itching and Other (See Comments)      Swelling lower legs    Ezetimibe GI Intolerance    Statins Myalgia       SOCIAL HISTORY     Social History     Substance and Sexual Activity   Alcohol Use Never     Social History     Substance and Sexual Activity   Drug Use Never     Social History     Tobacco Use   Smoking Status Former    Current packs/day: 0.00    Average packs/day: 2.0 packs/day for 54.6 years (109.1 ttl pk-yrs)    Types: Cigarettes    Start date: 1/1/1966    Quit date: 7/27/2020    Years since quitting: 3.9   Smokeless Tobacco Never       FAMILY HISTORY     Family History   Problem Relation Age of Onset    Hypertension Mother     Heart disease Mother         Valvular    Hyperlipidemia Mother     Hypertension Father     Heart defect Father          Cardiomegaly    Stroke Sister         Cerebrovascular Accident    Arthritis Brother     Other Brother         Back Disorder       CURRENT MEDICATIONS       Current Facility-Administered Medications:     acetaminophen (TYLENOL) tablet 975 mg, 975 mg, Oral, Q8H JAVIER, Des Longoria MD, 975 mg at 07/17/24 0515    allopurinol (ZYLOPRIM) tablet 100 mg, 100 mg, Oral, Daily, Des Longoria MD, 100 mg at 07/17/24 0845    amLODIPine (NORVASC) tablet 5 mg, 5 mg, Oral, Daily, Des Longoria MD, 5 mg at 07/17/24 0845    aspirin chewable tablet 81 mg, 81 mg, Oral, Daily, Des Longoria MD, 81 mg at 07/17/24 0845    ceFAZolin (ANCEF) IVPB (premix in dextrose) 1,000 mg 50 mL, 1,000 mg, Intravenous, Q12H, Des Longoria MD, Last Rate: 100 mL/hr at 07/17/24 0845, 1,000 mg at 07/17/24 0845    cinacalcet (SENSIPAR) tablet 30 mg, 30 mg, Oral, Every Other Day, Des Longoria MD    cloNIDine (CATAPRES-TTS-3) 0.3 mg/24 hr TD weekly patch, 1 patch, Transdermal, Weekly, Des Longoria MD    escitalopram (LEXAPRO) tablet 20 mg, 20 mg, Oral, Daily, Des Longoria MD, 20 mg at 07/17/24 0845    heparin (porcine) subcutaneous injection 5,000 Units, 5,000 Units, Subcutaneous, Q8H JAVIER, 5,000 Units at 07/17/24 0516 **AND** [CANCELED] Platelet count, , , Once, Des Longoria MD    HYDROmorphone HCl (DILAUDID) injection 0.2 mg, 0.2 mg, Intravenous, Q6H PRN, Des Longoria MD    insulin lispro (HumALOG/ADMELOG) 100 units/mL subcutaneous injection 1-5 Units, 1-5 Units, Subcutaneous, TID AC **AND** Fingerstick Glucose (POCT), , , TID AC, Grzegorz Voss MD    labetalol (NORMODYNE) tablet 300 mg, 300 mg, Oral, Q12H JAVIER, Des Longoria MD, 300 mg at 07/17/24 0845    lidocaine (LIDODERM) 5 % patch 1 patch, 1 patch, Topical, Daily, Des Longoria MD    losartan (COZAAR) tablet 100 mg, 100 mg, Oral, Daily, Des Longoria MD, 100 mg at 07/17/24 0845    ondansetron (ZOFRAN) injection 4 mg, 4 mg, Intravenous,  Q6H PRN, Des Longoria MD    oxyCODONE (ROXICODONE) IR tablet 5 mg, 5 mg, Oral, Q4H PRN, Des Longoria MD    oxyCODONE (ROXICODONE) split tablet 2.5 mg, 2.5 mg, Oral, Q4H PRN, Des Longoria MD    pravastatin (PRAVACHOL) tablet 80 mg, 80 mg, Oral, Daily With Dinner, Des Longoria MD    sodium chloride 0.9 % infusion, 75 mL/hr, Intravenous, Continuous, Des Longoria MD, Last Rate: 75 mL/hr at 07/16/24 2148, 75 mL/hr at 07/16/24 2148    ticagrelor (BRILINTA) tablet 90 mg, 90 mg, Oral, Q12H JAVIER, Des Longoria MD, 90 mg at 07/17/24 0845    traZODone (DESYREL) tablet 50 mg, 50 mg, Oral, HS, Des Longoria MD, 50 mg at 07/16/24 2153    REVIEW OF SYSTEMS     Review of Systems   Constitutional: Negative.    HENT: Negative.     Eyes: Negative.    Respiratory: Negative.     Cardiovascular: Negative.    Gastrointestinal: Negative.    Endocrine: Negative.    Genitourinary: Negative.    Musculoskeletal: Negative.    Skin:  Positive for color change and wound.   Allergic/Immunologic: Negative.    Neurological: Negative.    Hematological: Negative.    All other systems reviewed and are negative.        OBJECTIVE     Current Weight: Weight - Scale: 65.3 kg (144 lb)  Vitals:    07/17/24 0914   BP:    Pulse:    Resp:    Temp:    SpO2: 96%     Body mass index is 30.1 kg/m².    Intake/Output Summary (Last 24 hours) at 7/17/2024 1140  Last data filed at 7/17/2024 0849  Gross per 24 hour   Intake 145 ml   Output 625 ml   Net -480 ml       PHYSICAL EXAMINATION     Physical Exam  Constitutional:       Appearance: She is well-developed.   HENT:      Head: Normocephalic and atraumatic.   Eyes:      Pupils: Pupils are equal, round, and reactive to light.   Cardiovascular:      Rate and Rhythm: Normal rate and regular rhythm.      Heart sounds: Murmur heard.   Pulmonary:      Effort: Pulmonary effort is normal.   Abdominal:      General: Bowel sounds are normal.      Palpations: Abdomen is soft.    Musculoskeletal:         General: Deformity present. Normal range of motion.      Cervical back: Neck supple.   Skin:     General: Skin is warm.      Findings: Lesion present.   Neurological:      Mental Status: She is alert and oriented to person, place, and time.           LAB RESULTS        Results from last 7 days   Lab Units 07/17/24  0512 07/17/24  0156 07/16/24  1738   WBC Thousand/uL 14.69* 16.56* 14.08*   HEMOGLOBIN g/dL 9.7* 11.3* 11.1*   HEMATOCRIT % 30.4* 33.4* 33.5*   PLATELETS Thousands/uL 342 359 397*   POTASSIUM mmol/L 3.9  --  4.4   CHLORIDE mmol/L 106  --  104   CO2 mmol/L 20*  --  21   BUN mg/dL 31*  --  35*   CREATININE mg/dL 1.79*  --  2.19*   EGFR ml/min/1.73sq m 26  --  21   CALCIUM mg/dL 7.6*  --  8.8       I have personally reviewed the old medical records and patient's previously known baseline creatinine level is ~1.3-1.8    RADIOLOGY RESULTS       IMPRESSION:     No acute cardiopulmonary disease.    PLAN / RECOMMENDATIONS      79 years old female with past medical history of CAD, peripheral vascular disease, chronic kidney disease  stage IIIb/IV, hypertension, diabetes mellitus type 2, ex-smoker who presents to our facility with right toe gangrene.    1.  Acute kidney injury on chronic kidney disease stage IIIb/IV: Baseline serum creatinine has fluctuated between 1.3-1.8.  Presented with creatinine of 2.19 mg/dL and elevated.  Etiology is likely presence of sepsis with soft tissue infection being the likely source  Initiated on IV fluids IV antibiotics and supportive treatment resulting improvement in creatinine to 1.79 mg/dL today  Urinalysis with 1+ protein presence of pyuria and no bacteria noted  Patient with history of right-sided renal artery stenosis and therefore susceptible to hemodynamic fluctuation leading to RICARDO.  Currently on isotonic saline which can be continued.      2.  Gangrene of right toe: Likely due to underlying peripheral vascular disease with lower EXTR arterial  duplex study revealing significant stenosis.  Vascular surgery on board.    3.  Anemia due to CKD: Hemoglobin of 9.7 g/dL and acceptable for nondialysis CKD patient.    4.  Sepsis: Presented with leukocytosis, hypothermia with likely etiology being soft tissue infection.  On IV cefazolin    Thank you for the consultation to participate in patient's care. I have personally discussed my plan with the referring physician.     Radha Martinez MD    7/17/2024

## 2024-07-17 NOTE — PHYSICAL THERAPY NOTE
Physical Therapy Evaluation    Patient's Name: Anamaria Parnell    Admitting Diagnosis  Toe injury [S99.929A]  RICARDO (acute kidney injury) (Former[facility]) [N17.9]  Claudication in peripheral vascular disease ([facility]) [I73.9]  Gangrene of toe of right foot (Former[facility]) [I96]    Problem List  Patient Active Problem List   Diagnosis    Basilar artery stenosis    CVA (cerebral vascular accident) (Formerly [facility])    Chronic GERD    Acute renal failure superimposed on stage 3 chronic kidney disease (Former[facility])    Claudication in peripheral vascular disease ([facility])    Type 2 diabetes mellitus with diabetic nephropathy ([facility])    Gout    Hx-TIA (transient ischemic attack)    Hypercholesteremia    Hyperlipidemia associated with type 2 diabetes mellitus  ([facility])    Hypertension    Hypertension associated with diabetes  ([facility])    Osteoarthritis    Right renal artery stenosis ([facility])    Vertebrobasilar artery syndrome    Vitamin D deficiency    Statin intolerance    Lumbar disc herniation    Spondylosis of lumbar region without myelopathy or radiculopathy    Aortoiliac stenosis, left ([facility])    Lumbosacral spondylosis without myelopathy    Hyperlipidemia, unspecified    Herniated lumbar intervertebral disc    Celiac artery stenosis ([facility]) >70% stenosis in the celiac trunk    Abnormal CT of the chest suspicious for an infectious or inflammatory bronchiolitis.    Positive cardiac stress test  small, mildly severe, partially reversible myocardial perfusion defect of anterior and inferior wall     Femoral artery stenosis, right (HCC) 50-75% stenosis in the common femoral artery.    Mesenteric artery stenosis ([facility])    Atherosclerosis of native arteries of right leg with ulceration of other part of foot ([facility])    Symptomatic carotid artery stenosis, left    Pulmonary nodule 7 mm groundglass opacity in the right upper lobe, unchanged since October 2019    Stenosis of left anterior descending artery Mid LAD 50-60% stenosis    Right coronary artery occlusion ([facility])total occlusion of  proximal RCA with collateral circ    Malignant neoplasm of overlapping sites of bladder (HCC)    Cervical radiculopathy    Stage 4 chronic kidney disease (HCC)    Hypertensive kidney disease with stage 4 chronic kidney disease (HCC)    Depression, recurrent (HCC)    Type 2 diabetes mellitus, without long-term current use of insulin (HCC)    Type 2 diabetes mellitus with diabetic peripheral angiopathy without gangrene (HCC)    Gastrointestinal stromal tumor (GIST) (HCC)    History of gastrointestinal stromal tumor (GIST)    Secondary hyperparathyroidism of renal origin (HCC)    Aortoiliac occlusive disease (HCC)    Dysarthria    Stage 3b chronic kidney disease (HCC)    Primary hypertension    Smoking    Renal cyst    Encounter to discuss test results    Complex renal cyst    Encounter for follow-up surveillance of urothelial carcinoma of lower urinary tract    Carotid stenosis, asymptomatic, right    Diabetic ulcer of toe of right foot associated with diabetes mellitus due to underlying condition, limited to breakdown of skin (HCC)    Gangrene (HCC)    Sepsis (HCC)       Past Medical History  Past Medical History:   Diagnosis Date    Aphasia as late effect of cerebrovascular accident (CVA) 08/16/2023    Arthritis     Chronic kidney disease     Diabetes mellitus (HCC)     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/20/2021    Endometriosis     High cholesterol     History of left common carotid artery stent placement 10/27/2023    Hypertension     Kidney disease, chronic, stage III (moderate, EGFR 30-59 ml/min) (HCC)     Lumbar disc herniation     Median arcuate ligament syndrome (HCC) 11/05/2019    Neuropathy     Obesity (BMI 30-39.9) 12/04/2017    Obesity, morbid (HCC) 01/20/2021    Peripheral vascular disease (HCC)     Pneumonia     Shoulder injury     left    Spinal stenosis     Stroke (HCC) 2015    Memory loss       Past Surgical History  Past Surgical History:   Procedure Laterality  Date    CARDIAC CATHETERIZATION      CAROTID STENT Left 9/7/2023    Procedure: L TCAR;  Surgeon: Nicole Gallo MD;  [facility];  Service: Vascular    COLONOSCOPY      FL RETROGRADE PYELOGRAM  4/6/2020    FRACTURE SURGERY Right     ankle    IR CAROTID STENT  9/7/2023    OVARIAN CYST SURGERY      CA CYSTO BLADDER W/URETERAL CATHETERIZATION Bilateral 4/6/2020    Procedure: CYSTOSCOPY WITH RETROGRADE PYELOGRAM;  Surgeon: Robert Mitchell MD;  [facility];  Service: Urology    CA CYSTO W/INSERT URETERAL STENT Right 4/6/2020    Procedure: INSERTION STENT URETERAL;  Surgeon: Robert Mitchell MD;  [facility];  Service: Urology    CA CYSTO W/REMOVAL OF TUMORS SMALL N/A 4/6/2020    Procedure: TRANSURETHRAL RESECTION OF BLADDER TUMOR (TURBT);  Surgeon: Robert Mitchell MD;  [facility];  Service: Urology    ROTATOR CUFF REPAIR Left     TONSILLECTOMY         Recent Imaging  XR chest portable   Final Result by Jaylen Macias MD (07/17 0918)      No acute cardiopulmonary disease.            Workstation performed: [id number]         VAS ARTERIAL DUPLEX- LOWER LIMB BILATERAL    (Results Pending)       Recent Vital Signs  Vitals:    07/17/24 0713 07/17/24 0718 07/17/24 0750 07/17/24 0914   BP: (!) 119/48 (!) 119/48 126/51    Pulse: 72 71 69    Resp: 18      Temp: 97.5 °F (36.4 °C) 97.5 °F (36.4 °C) 98 °F (36.7 °C)    TempSrc: Oral      SpO2: 95% 94% 94% 96%   Weight:       Height:              07/17/24 0914   PT Last Visit   PT Visit Date 07/17/24   Note Type   Note type Evaluation   Pain Assessment   Pain Assessment Tool 0-10   Pain Score No Pain   Restrictions/Precautions   Weight Bearing Precautions Per Order   (maintained NWB R LE this session pending podiatry consult)   Braces or Orthoses Other (Comment)  (post op shoe for R foot)   Other Precautions Chair Alarm;Bed Alarm;Fall Risk;Pain   Home Living   Type of Home House   Home Layout One level;Ramped entrance;Performs ADLs  on one level;Able to live on main level with bedroom/bathroom   Bathroom Shower/Tub Walk-in shower   Bathroom Toilet Standard   Bathroom Equipment Grab bars in shower;Grab bars around toilet;Shower chair;Commode   Bathroom Accessibility Accessible;Accessible via wheelchair   Home Equipment Walker;Cane;Wheelchair-electric   Additional Comments stand pivot to and from WC to toilet and shower   Prior Function   Level of [place] Independent with ADLs;Independent with functional mobility;Independent with IADLS   Lives With Son   Receives Help From Family   IADLs Independent with meal prep;Family/Friend/Other provides medication management;Family/Friend/Other provides transportation   Falls in the last 6 months 0   Vocational Retired   General   Family/Caregiver Present Yes   Cognition   Overall Cognitive Status WFL   Arousal/Participation Alert   Orientation Level Oriented X4   Memory Within functional limits   Following Commands Follows all commands and directions without difficulty   Comments agreeable to PT evaluation   RLE Assessment   RLE Assessment WFL  (3+/5)   LLE Assessment   LLE Assessment WFL  (3+/5)   Vision-Basic Assessment   Current Vision Wears glasses all the time   Coordination   Sensation X  (decreased in B LE (feet))   Bed Mobility   Additional Comments greeted PT OOB in chair   Transfers   Sit to Stand 4  Minimal assistance   Additional items Assist x 1;Increased time required;Armrests;Verbal cues   Stand to Sit 4  Minimal assistance   Additional items Assist x 1;Increased time required;Verbal cues;Armrests   Stand pivot 4  Minimal assistance   Additional items Assist x 1;Increased time required;Armrests;Verbal cues   Ambulation/Elevation   Gait pattern Not appropriate;Not tested   Balance   Static Sitting Fair +   Dynamic Sitting Fair   Static Standing Poor +   Dynamic Standing Poor   Activity Tolerance   Activity Tolerance Patient limited by fatigue   Medical Staff Made Aware CHRIS Castillo  (Pt seen  as co-evaluation with OT due to pt's co-morbidities, clinically unstable presentation, and present impairments.)   Nurse Made Aware Spoke to RN   Assessment   Prognosis Good   Problem List Decreased strength;Decreased endurance;Impaired balance;Decreased mobility;Pain   Assessment Anamaria Parnell is a 79 y.o. female admitted to [facility] on 7/16/2024 for Sepsis ([facility]). Pt  has a past medical history of Aphasia as late effect of cerebrovascular accident (CVA) (08/16/2023), Arthritis, Chronic kidney disease, Diabetes mellitus ([facility]), Embolism and 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 ([facility]) (01/20/2021), Endometriosis, High cholesterol, History of left common carotid artery stent placement (10/27/2023), Hypertension, Kidney disease, chronic, stage III (moderate, EGFR 30-59 ml/min) ([facility]), Lumbar disc herniation, Median arcuate ligament syndrome ([facility]) (11/05/2019), Neuropathy, Obesity (BMI 30-39.9) (12/04/2017), Obesity, morbid ([facility]) (01/20/2021), Peripheral vascular disease ([facility]), Pneumonia, Shoulder injury, Spinal stenosis, and Stroke ([facility]) (2015).. Order placed for PT eval and tx. PT was consulted and pt was seen on 7/17/2024 for mobility assessment and d/c planning. Chart review and two person identifiers were completed.   Currently pt presents with decreased strength , decreased static sitting balance , decreased dynamic sitting balance , decreased static standing balance, decreased dynamic standing balance , and decreased muscular endurance . Due to these impairments, they will require assistance to perform bed mobility, sit to stand , ambulation, and transfers. Pt is currently functioning at a minimum assistance x1 level for bed mobility, minimum assistance x1 level for transfers. Pt greeted seated in recliner and agreeable to PT session. Pt completed 2 stand pivot transfers with min A x1. Surgical shoe donned prior to transfers. Pt ended session seated in chair with alarm on and all needs within reach. These  activity limitations significantly impact their ability to participate in previous home and community roles and responsibilities  and ambulation in home. The patient's AM-[facility] Basic Mobility Inpatient Short Form Raw Score is 15. PT recommends level II moderate resource intensity. They will benefit from skilled therapy to to reduce the risk of falls and to maximize functional potential.   Barriers to Discharge Inaccessible home environment;Decreased caregiver support   Goals   Patient Goals none expressed   STG Expiration Date 07/27/24   Short Term Goal #1 Within 10 days patient will complete: 1) Bed mobility skills with modified independent assistance to facilitate safe return to previous living environment 2) Functional transfers with modified independent assistance to facilitate safe return to previous living environment  3) Improve balance grades to fair + to reduce risk of falls. 4)Improve LE strength grades by 1 to increase independence w/ transfers and gait. 5) continued evaluation for ambulation and elevation goals.  6) PT for ongoing pt and family education; DME needs and D/C planning to promote highest level of function in least restrictive environment.   Plan   Treatment/Interventions Functional transfer training;LE strengthening/ROM;Therapeutic exercise;Endurance training;Patient/family training;Equipment eval/education;Bed mobility   PT Frequency 3-5x/wk   Discharge Recommendation   Rehab Resource Intensity Level, PT II (Moderate Resource Intensity)   Equipment Recommended   (TBD)   AM-PAC Basic Mobility Inpatient   Turning in Flat Bed Without Bedrails 3   Lying on Back to Sitting on Edge of Flat Bed Without Bedrails 3   Moving Bed to Chair 3   Standing Up From Chair Using Arms 3   Walk in Room 2   Climb 3-5 Stairs With Railing 1   Basic Mobility Inpatient Raw Score 15   Basic Mobility Standardized Score 36.97   [facility] Highest Level Of Mobility   [facility] Goal 4: Move to chair/commode   [facility]  Achieved 5: Stand (1 or more minutes)   End of Consult   Patient Position at End of Consult Bedside chair;Bed/Chair alarm activated;All needs within reach       Recommendations                                                                                                              Pt will benefit from continued skilled IP PT to address the above mentioned impairments in order to maximize recovery and increase functional independence when completing mobility and ADLs. See flow sheet for goals and POC.     PT Evaluation Time: 0914-0936    Dangelo Puente, PT, DPT

## 2024-07-17 NOTE — UTILIZATION REVIEW
Initial Clinical Review    Admission: Date/Time/Statement:   Admission Orders (From admission, onward)       Ordered        07/16/24 1938  INPATIENT ADMISSION  Once                          Orders Placed This Encounter   Procedures    INPATIENT ADMISSION     Standing Status:   Standing     Number of Occurrences:   1     Order Specific Question:   Level of Care     Answer:   Med Surg [16]     Order Specific Question:   Estimated length of stay     Answer:   More than 2 Midnights     Order Specific Question:   Certification     Answer:   I certify that inpatient services are medically necessary for this patient for a duration of greater than two midnights. See H&P and MD Progress Notes for additional information about the patient's course of treatment.     ED Arrival Information       Expected   -    Arrival   7/16/2024 17:02    Acuity   Emergent              Means of arrival   Wheelchair    Escorted by   Family Member    Service   Hospitalist    Admission type   Emergency              Arrival complaint   Right Foot Swelling & Pain             Chief Complaint   Patient presents with    Toe Injury     Presents with black right big toe with pitting edema to right foot. Pedal pulses unable to be ausculted with doppler.        Initial Presentation: 79 y.o. female to ED from home w/ PMH of peripheral arterial disease, chronic kidney disease, diabetes who presents with toe pain.  Patient states she has been having toe pain for a while getting progressively worse.   follows w/ podiatry weekly. Was in toe now seems to be moving up into foot .has severe vasculopathic disease. Arterial dopplers appear to show more chronic changes . Admitted IP status w/ R toe pain , PAD , gangrene . Plan for pain regimen , podiatry consult , vascular consult . DM SSI and monitor . ARF CR 2.19, baseline 1.2-1.8.     Anticipated Length of Stay/Certification Statement:  Patient will be admitted on an inpatient basis with an anticipated length of  stay of greater than 2 midnights secondary to having severe peripheral arterial disease with gangrene of the big toe on the right.     Date: 7/17   Day 2: sepsis on IV cefazolin , s/p 1l IVF , f/u bld cultures. Monitor wbc . Gangrene podiatry and vascular consults . Pain mngt . Cr improved to 1.79 from 2.19 , nephrology consulted. PAD asa, brillinta , statin . DM SSI and monitor .    7/17 Nephrology Consult    Acute kidney injury on chronic kidney disease stage IIIb/IV: Baseline serum creatinine has fluctuated between 1.3-1.8.  Presented with creatinine of 2.19 mg/dL and elevated.  Etiology is likely presence of sepsis with soft tissue infection being the likely source  Initiated on IV fluids IV antibiotics and supportive treatment resulting improvement in creatinine to 1.79 mg/dL today.Gangrene of right toe: Likely due to underlying peripheral vascular disease with lower EXTR arterial duplex study revealing significant stenosis.  Vascular surgery on board.       Date: 7/18  Day 3: Has surpassed a 2nd midnight with active treatments and services. Pt reports she does not want any further vascular intervention . She understands she has bad PAD and will remain high risk for surgery . Willing to proceed w/ CTA and take an informed decision once aware of her vascular options. Nephrology following in anticipation of vascular procedure . CR improved to 1.53 from 1.79. cont gentle IVF . Cont IV abx . F/u ua cx . Wbc 12.31 dec from 14.69. given pre and post fluids for CT angiogram prep .     7/18 Podiatry Consult   Right hallux dry gangrene appears clinically stable. Concern for critical limb ischemia as mentioned by vascular surgery. Pending CTA results . Podiatry will follow peripherally, pending vascular recommendations . Cont local wound care of betadine qd.         ED Triage Vitals   Temperature Pulse Respirations Blood Pressure SpO2 Pain Score   07/16/24 1706 07/16/24 1706 07/16/24 1706 07/16/24 1706 07/16/24 1706  07/16/24 1736   98 °F (36.7 °C) 89 18 122/54 98 % 10 - Worst Possible Pain     Weight (last 2 days)       Date/Time Weight    07/16/24 1706 65.3 (144)            Vital Signs (last 3 days)       Date/Time Temp Pulse Resp BP MAP (mmHg) SpO2 O2 Device Patient Position - Orthostatic VS Pain    07/18/24 07:20:23 97.7 °F (36.5 °C) 72 17 149/56 87 96 % -- -- --    07/18/24 07:10:13 98.1 °F (36.7 °C) 69 -- 163/61 95 97 % -- -- --    07/18/24 0434 -- -- -- -- -- -- -- -- 6    07/17/24 23:15:26 98.1 °F (36.7 °C) 67 16 128/53 78 96 % None (Room air) Sitting --    07/17/24 2124 -- -- -- -- -- -- -- -- 10 - Worst Possible Pain    07/17/24 21:23:58 97.9 °F (36.6 °C) 73 20 150/56 87 96 % None (Room air) Sitting --    07/17/24 1613 97.2 °F (36.2 °C) 66 -- 97/74 -- 96 % -- -- --    07/17/24 15:12:55 97.4 °F (36.3 °C) 65 14 84/66 72 97 % -- -- --    07/17/24 1439 -- -- -- -- -- -- -- -- 3    07/17/24 0940 -- -- -- -- -- -- -- -- No Pain    07/17/24 0914 -- -- -- -- -- 96 % None (Room air) -- No Pain    07/17/24 07:50:44 98 °F (36.7 °C) 69 -- 126/51 76 94 % -- -- --    07/17/24 07:18:04 97.5 °F (36.4 °C) 71 -- 119/48 72 94 % -- -- --    07/17/24 07:13:30 97.5 °F (36.4 °C) 72 18 119/48 72 95 % None (Room air) Lying --    07/17/24 0515 -- -- -- -- -- -- -- -- 2    07/17/24 05:11:05 97.4 °F (36.3 °C) 77 -- -- -- 93 % -- -- --    07/17/24 0145 95.3 °F (35.2 °C)  -- -- -- -- -- -- -- --    07/17/24 01:33:08 96 °F (35.6 °C) 77 -- 155/69 98 95 % -- -- --    07/16/24 2152 -- -- -- -- -- -- None (Room air) -- 1    07/16/24 2120 -- -- -- -- -- -- -- -- No Pain    07/16/24 2105 96.7 °F (35.9 °C) -- -- -- -- 94 % -- Sitting --    07/16/24 21:02:50 96.4 °F (35.8 °C) -- -- 126/58 81 -- -- -- --    07/16/24 2000 -- 72 16 155/67 97 95 % None (Room air) -- No Pain    07/16/24 1900 -- 79 16 154/62 89 94 % -- -- --    07/16/24 1800 -- 87 18 158/71 102 97 % -- -- --    07/16/24 1736 -- -- -- -- -- -- -- -- 10 - Worst Possible Pain    07/16/24 1706 98 °F  (36.7 °C) 89 18 122/54 -- 98 % None (Room air) Sitting --              Pertinent Labs/Diagnostic Test Results:   Radiology:  XR chest portable   Final Interpretation by Jaylen Macias MD (07/17 0918)      No acute cardiopulmonary disease.            Workstation performed: [id number]         VAS ARTERIAL DUPLEX- LOWER LIMB BILATERAL    (Results Pending)   CTA ABDOMEN W RUN OFF W WO CONTRAST    (Results Pending)     Cardiology:  No orders to display     GI:  No orders to display           Results from last 7 days   Lab Units 07/18/24  0537 07/17/24  0512 07/17/24  0156 07/16/24  1738   WBC Thousand/uL 12.31* 14.69* 16.56* 14.08*   HEMOGLOBIN g/dL 10.4* 9.7* 11.3* 11.1*   HEMATOCRIT % 32.1* 30.4* 33.4* 33.5*   PLATELETS Thousands/uL 333 342 359 397*   TOTAL NEUT ABS Thousands/µL 10.35*  --   --  11.55*         Results from last 7 days   Lab Units 07/18/24  0537 07/17/24  0512 07/16/24  1738   SODIUM mmol/L 140 137 140   POTASSIUM mmol/L 3.9 3.9 4.4   CHLORIDE mmol/L 109* 106 104   CO2 mmol/L 21 20* 21   ANION GAP mmol/L 10 11 15*   BUN mg/dL 22 31* 35*   CREATININE mg/dL 1.53* 1.79* 2.19*   EGFR ml/min/1.73sq m 32 26 21   CALCIUM mg/dL 8.2* 7.6* 8.8     Results from last 7 days   Lab Units 07/16/24  1738   AST U/L 7*   ALT U/L 4*   ALK PHOS U/L 96   TOTAL PROTEIN g/dL 8.1   ALBUMIN g/dL 3.8   TOTAL BILIRUBIN mg/dL 0.38     Results from last 7 days   Lab Units 07/18/24  1041 07/18/24  0607 07/17/24  2148 07/17/24  1543 07/17/24  1052 07/17/24  0628   POC GLUCOSE mg/dl 119 104 105 89 120 135     Results from last 7 days   Lab Units 07/18/24  0537 07/17/24  0512 07/16/24  1738   GLUCOSE RANDOM mg/dL 108 135 106       Results from last 7 days   Lab Units 07/16/24  1738   PROTIME seconds 15.6*   INR  1.17   PTT seconds 41*         Results from last 7 days   Lab Units 07/17/24  0220   CLARITY UA  Clear   COLOR UA  Light Yellow   SPEC GRAV UA  1.016   PH UA  5.5   GLUCOSE UA mg/dl Negative   KETONES UA mg/dl Negative    BLOOD UA  Negative   PROTEIN UA mg/dl 50 (1+)*   NITRITE UA  Negative   BILIRUBIN UA  Negative   UROBILINOGEN UA (BE) mg/dl <2.0   LEUKOCYTES UA  Moderate*   WBC UA /hpf 20-30*   RBC UA /hpf 2-4*   BACTERIA UA /hpf None Seen   EPITHELIAL CELLS WET PREP /hpf Occasional       Results from last 7 days   Lab Units 07/17/24  0220 07/17/24  0156   BLOOD CULTURE   --  No Growth at 24 hrs.   URINE CULTURE  Culture too young- will reincubate  --        ED Treatment-Medication Administration from 07/16/2024 1701 to 07/16/2024 2058         Date/Time Order Dose Route Action     07/16/2024 1736 HYDROmorphone (DILAUDID) injection 1 mg 1 mg Intravenous Given     07/16/2024 1804 ondansetron (ZOFRAN) injection 4 mg 4 mg Intravenous Given     07/16/2024 1919 multi-electrolyte (ISOLYTE-S PH 7.4) bolus 1,000 mL 1,000 mL Intravenous New Bag            Past Medical History:   Diagnosis Date    Aphasia as late effect of cerebrovascular accident (CVA) 08/16/2023    Arthritis     Chronic kidney disease     Diabetes mellitus ([facility])     Embolism and 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 ([facility]) 01/20/2021    Endometriosis     High cholesterol     History of left common carotid artery stent placement 10/27/2023    Hypertension     Kidney disease, chronic, stage III (moderate, EGFR 30-59 ml/min) ([facility])     Lumbar disc herniation     Median arcuate ligament syndrome ([facility]) 11/05/2019    Neuropathy     Obesity (BMI 30-39.9) 12/04/2017    Obesity, morbid ([facility]) 01/20/2021    Peripheral vascular disease ([facility])     Pneumonia     Shoulder injury     left    Spinal stenosis     Stroke ([facility]) 2015    Memory loss     Present on Admission:   Aortoiliac occlusive disease ([facility])   Basilar artery stenosis   Type 2 diabetes mellitus with diabetic nephropathy ([facility])   Acute renal failure superimposed on stage 3 chronic kidney disease ([facility])      Admitting Diagnosis: Toe injury [S99.929A]  RICARDO (acute kidney injury) ([facility]) [N17.9]  Claudication in peripheral  vascular disease (HCC) [I73.9]  Gangrene of toe of right foot (HCC) [I96]  Age/Sex: 79 y.o. female  Admission Orders:  Scheduled Medications:  acetaminophen, 975 mg, Oral, Q8H JAVIER  allopurinol, 100 mg, Oral, Daily  amLODIPine, 5 mg, Oral, Daily  aspirin, 81 mg, Oral, Daily  cefazolin, 1,000 mg, Intravenous, Q12H  cinacalcet, 30 mg, Oral, Every Other Day  cloNIDine, 1 patch, Transdermal, Weekly  escitalopram, 20 mg, Oral, Daily  heparin (porcine), 5,000 Units, Subcutaneous, Q8H JAVIER  insulin lispro, 1-5 Units, Subcutaneous, TID AC  labetalol, 300 mg, Oral, Q12H JAVIER  lidocaine, 1 patch, Topical, Daily  losartan, 100 mg, Oral, Daily  pravastatin, 80 mg, Oral, Daily With Dinner  ticagrelor, 90 mg, Oral, Q12H JAVIER  traZODone, 50 mg, Oral, HS      Continuous IV Infusions:  sodium chloride, 75 mL/hr, Intravenous, Continuous      PRN Meds:  HYDROmorphone, 0.2 mg, Intravenous, Q6H PRN  7/16 x1  ondansetron, 4 mg, Intravenous, Q6H PRN  7/16 x1  oxyCODONE, 5 mg, Oral, Q4H PRN  oxyCODONE, 2.5 mg, Oral, Q4H PRN    Fingerstick ac and hs   PT OT eval   I&O   Up and OOB       IP CONSULT TO NEPHROLOGY  IP CONSULT TO PODIATRY  IP CONSULT TO VASCULAR SURGERY    Network Utilization Review Department  ATTENTION: Please call with any questions or concerns to [phone] and carefully listen to the prompts so that you are directed to the right person. All voicemails are confidential.   For Discharge needs, contact Care Management DC Support Team at [phone] opt. 2  Send all requests for admission clinical reviews, approved or denied determinations and any other requests to dedicated fax number below belonging to the campus where the patient is receiving treatment. List of dedicated fax numbers for the Facilities:  FACILITY NAME UR FAX NUMBER   ADMISSION DENIALS (Administrative/Medical Necessity) [phone]   DISCHARGE SUPPORT TEAM (NETWORK) [phone]   PARENT CHILD HEALTH (Maternity/NICU/Pediatrics) [phone]   [facility] [phone]   [facility] [phone]   [facility] [phone]   [facility] [phone]   [facility] [phone]   [facility] [phone]   [facility] [phone]   [facility] [phone]   [facility] [phone]   [facility] [phone]   General acute hospital [phone]   [facility] [phone]

## 2024-07-17 NOTE — ASSESSMENT & PLAN NOTE
Lab Results   Component Value Date    HGBA1C 5.9 (H) 07/09/2024       Recent Labs     07/17/24  0628   POCGLU 135       Blood Sugar Average: Last 72 hrs:  (P) 135  Hold home Tradjenta during hospitalization  Patient placed on sliding scale coverage with ACHS checks  A1c notes good control of blood sugar [5.9]  Hypoglycemia protocol.

## 2024-07-18 ENCOUNTER — APPOINTMENT (INPATIENT)
Dept: VASCULAR ULTRASOUND | Facility: HOSPITAL | Age: 79
DRG: 872 | End: 2024-07-18
Payer: COMMERCIAL

## 2024-07-18 ENCOUNTER — TELEPHONE (OUTPATIENT)
Dept: NEPHROLOGY | Facility: CLINIC | Age: 79
End: 2024-07-18

## 2024-07-18 ENCOUNTER — APPOINTMENT (INPATIENT)
Dept: CT IMAGING | Facility: HOSPITAL | Age: 79
DRG: 872 | End: 2024-07-18
Payer: COMMERCIAL

## 2024-07-18 LAB
ANION GAP SERPL CALCULATED.3IONS-SCNC: 10 MMOL/L (ref 4–13)
BASOPHILS # BLD AUTO: 0.06 THOUSANDS/ÂΜL (ref 0–0.1)
BASOPHILS NFR BLD AUTO: 1 % (ref 0–1)
BUN SERPL-MCNC: 22 MG/DL (ref 5–25)
CALCIUM SERPL-MCNC: 8.2 MG/DL (ref 8.4–10.2)
CHLORIDE SERPL-SCNC: 109 MMOL/L (ref 96–108)
CO2 SERPL-SCNC: 21 MMOL/L (ref 21–32)
CREAT SERPL-MCNC: 1.53 MG/DL (ref 0.6–1.3)
EOSINOPHIL # BLD AUTO: 0.28 THOUSAND/ÂΜL (ref 0–0.61)
EOSINOPHIL NFR BLD AUTO: 2 % (ref 0–6)
ERYTHROCYTE [DISTWIDTH] IN BLOOD BY AUTOMATED COUNT: 13.4 % (ref 11.6–15.1)
GFR SERPL CREATININE-BSD FRML MDRD: 32 ML/MIN/1.73SQ M
GLUCOSE SERPL-MCNC: 100 MG/DL (ref 65–140)
GLUCOSE SERPL-MCNC: 104 MG/DL (ref 65–140)
GLUCOSE SERPL-MCNC: 108 MG/DL (ref 65–140)
GLUCOSE SERPL-MCNC: 119 MG/DL (ref 65–140)
GLUCOSE SERPL-MCNC: 128 MG/DL (ref 65–140)
HCT VFR BLD AUTO: 32.1 % (ref 34.8–46.1)
HGB BLD-MCNC: 10.4 G/DL (ref 11.5–15.4)
IMM GRANULOCYTES # BLD AUTO: 0.08 THOUSAND/UL (ref 0–0.2)
IMM GRANULOCYTES NFR BLD AUTO: 1 % (ref 0–2)
LYMPHOCYTES # BLD AUTO: 0.59 THOUSANDS/ÂΜL (ref 0.6–4.47)
LYMPHOCYTES NFR BLD AUTO: 5 % (ref 14–44)
MCH RBC QN AUTO: 28.5 PG (ref 26.8–34.3)
MCHC RBC AUTO-ENTMCNC: 32.4 G/DL (ref 31.4–37.4)
MCV RBC AUTO: 88 FL (ref 82–98)
MONOCYTES # BLD AUTO: 0.95 THOUSAND/ÂΜL (ref 0.17–1.22)
MONOCYTES NFR BLD AUTO: 8 % (ref 4–12)
NEUTROPHILS # BLD AUTO: 10.35 THOUSANDS/ÂΜL (ref 1.85–7.62)
NEUTS SEG NFR BLD AUTO: 83 % (ref 43–75)
NRBC BLD AUTO-RTO: 0 /100 WBCS
PLATELET # BLD AUTO: 333 THOUSANDS/UL (ref 149–390)
PMV BLD AUTO: 10.4 FL (ref 8.9–12.7)
POTASSIUM SERPL-SCNC: 3.9 MMOL/L (ref 3.5–5.3)
RBC # BLD AUTO: 3.65 MILLION/UL (ref 3.81–5.12)
SODIUM SERPL-SCNC: 140 MMOL/L (ref 135–147)
WBC # BLD AUTO: 12.31 THOUSAND/UL (ref 4.31–10.16)

## 2024-07-18 PROCEDURE — 80048 BASIC METABOLIC PNL TOTAL CA: CPT | Performed by: FAMILY MEDICINE

## 2024-07-18 PROCEDURE — 99232 SBSQ HOSP IP/OBS MODERATE 35: CPT | Performed by: NURSE PRACTITIONER

## 2024-07-18 PROCEDURE — 82948 REAGENT STRIP/BLOOD GLUCOSE: CPT

## 2024-07-18 PROCEDURE — 99232 SBSQ HOSP IP/OBS MODERATE 35: CPT | Performed by: FAMILY MEDICINE

## 2024-07-18 PROCEDURE — 99232 SBSQ HOSP IP/OBS MODERATE 35: CPT | Performed by: INTERNAL MEDICINE

## 2024-07-18 PROCEDURE — 93971 EXTREMITY STUDY: CPT

## 2024-07-18 PROCEDURE — 85025 COMPLETE CBC W/AUTO DIFF WBC: CPT | Performed by: FAMILY MEDICINE

## 2024-07-18 PROCEDURE — 75635 CT ANGIO ABDOMINAL ARTERIES: CPT

## 2024-07-18 RX ORDER — SODIUM CHLORIDE 9 MG/ML
75 INJECTION, SOLUTION INTRAVENOUS CONTINUOUS
Status: DISPENSED | OUTPATIENT
Start: 2024-07-18 | End: 2024-07-18

## 2024-07-18 RX ADMIN — SODIUM CHLORIDE 75 ML/HR: 0.9 INJECTION, SOLUTION INTRAVENOUS at 12:20

## 2024-07-18 RX ADMIN — PRAVASTATIN SODIUM 80 MG: 80 TABLET ORAL at 16:24

## 2024-07-18 RX ADMIN — ACETAMINOPHEN 975 MG: 325 TABLET, FILM COATED ORAL at 13:40

## 2024-07-18 RX ADMIN — ALLOPURINOL 100 MG: 100 TABLET ORAL at 10:29

## 2024-07-18 RX ADMIN — IOHEXOL 120 ML: 350 INJECTION, SOLUTION INTRAVENOUS at 09:47

## 2024-07-18 RX ADMIN — HEPARIN SODIUM 5000 UNITS: 5000 INJECTION INTRAVENOUS; SUBCUTANEOUS at 04:34

## 2024-07-18 RX ADMIN — ESCITALOPRAM OXALATE 20 MG: 10 TABLET ORAL at 10:28

## 2024-07-18 RX ADMIN — TICAGRELOR 90 MG: 90 TABLET ORAL at 21:00

## 2024-07-18 RX ADMIN — HEPARIN SODIUM 5000 UNITS: 5000 INJECTION INTRAVENOUS; SUBCUTANEOUS at 22:25

## 2024-07-18 RX ADMIN — LABETALOL HYDROCHLORIDE 300 MG: 100 TABLET, FILM COATED ORAL at 10:28

## 2024-07-18 RX ADMIN — AMLODIPINE BESYLATE 5 MG: 5 TABLET ORAL at 10:29

## 2024-07-18 RX ADMIN — LOSARTAN POTASSIUM 100 MG: 50 TABLET, FILM COATED ORAL at 10:28

## 2024-07-18 RX ADMIN — HEPARIN SODIUM 5000 UNITS: 5000 INJECTION INTRAVENOUS; SUBCUTANEOUS at 13:41

## 2024-07-18 RX ADMIN — ACETAMINOPHEN 975 MG: 325 TABLET, FILM COATED ORAL at 04:34

## 2024-07-18 RX ADMIN — CEFAZOLIN SODIUM 1000 MG: 1 SOLUTION INTRAVENOUS at 10:31

## 2024-07-18 RX ADMIN — CINACALCET 30 MG: 30 TABLET, FILM COATED ORAL at 10:28

## 2024-07-18 RX ADMIN — TRAZODONE HYDROCHLORIDE 50 MG: 50 TABLET ORAL at 22:25

## 2024-07-18 RX ADMIN — ACETAMINOPHEN 975 MG: 325 TABLET, FILM COATED ORAL at 22:25

## 2024-07-18 RX ADMIN — ASPIRIN 81 MG: 81 TABLET, CHEWABLE ORAL at 10:28

## 2024-07-18 RX ADMIN — LABETALOL HYDROCHLORIDE 300 MG: 100 TABLET, FILM COATED ORAL at 20:57

## 2024-07-18 RX ADMIN — CEFAZOLIN SODIUM 1000 MG: 1 SOLUTION INTRAVENOUS at 20:57

## 2024-07-18 RX ADMIN — TICAGRELOR 90 MG: 90 TABLET ORAL at 10:29

## 2024-07-18 NOTE — ASSESSMENT & PLAN NOTE
Lab Results   Component Value Date    EGFR 32 07/18/2024    EGFR 26 07/17/2024    EGFR 21 07/16/2024    CREATININE 1.53 (H) 07/18/2024    CREATININE 1.79 (H) 07/17/2024    CREATININE 2.19 (H) 07/16/2024   Admitted with acute on chronic kidney disease.  (Baseline  1.2-1.8)  Admitted with creatinine 2.19.   On gentle hydration. Improving, creatinine 1.53 this am  Nephrology consulted in anticipation of vascular procedure

## 2024-07-18 NOTE — CASE MANAGEMENT
Case Management Discharge Planning Note    Patient name Anamaria Parnell  Location /-01 MRN [id number]  : 1945 Date 2024       Current Admission Date: 2024  Current Admission Diagnosis:Sepsis (HCC)   Patient Active Problem List    Diagnosis Date Noted Date Diagnosed    Sepsis (HCC) 2024     Gangrene (HCC) 2024     Diabetic ulcer of toe of right foot associated with diabetes mellitus due to underlying condition, limited to breakdown of skin (HCC) 2024     Carotid stenosis, asymptomatic, right 10/27/2023     Complex renal cyst 10/18/2023     Encounter for follow-up surveillance of urothelial carcinoma of lower urinary tract 10/18/2023     Encounter to discuss test results 10/17/2023     Smoking 2023     Renal cyst 2023     Primary hypertension 2023     Dysarthria 08/15/2023     Stage 3b chronic kidney disease (HCC)      Aortoiliac occlusive disease (HCC) 10/11/2022     History of gastrointestinal stromal tumor (GIST) 2022     Secondary hyperparathyroidism of renal origin (HCC) 2022     Gastrointestinal stromal tumor (GIST) (HCC) 2022     Type 2 diabetes mellitus, without long-term current use of insulin (HCC) 2021     Type 2 diabetes mellitus with diabetic peripheral angiopathy without gangrene (HCC) 2021     Depression, recurrent (HCC) 2021     Stage 4 chronic kidney disease (HCC) 2020     Hypertensive kidney disease with stage 4 chronic kidney disease (HCC) 2020     Cervical radiculopathy 2020     Malignant neoplasm of overlapping sites of bladder (HCC) 2020     Stenosis of left anterior descending artery Mid LAD 50-60% stenosis 2020     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 2020     Pulmonary nodule 7 mm groundglass opacity in the right upper lobe, unchanged since 2020     Atherosclerosis of native arteries  of right leg with ulceration of other part of foot ([facility]) 03/03/2020     Symptomatic carotid artery stenosis, left 03/03/2020     Femoral artery stenosis, right ([facility]) 50-75% stenosis in the common femoral artery. 01/14/2020     Mesenteric artery stenosis ([facility]) 01/14/2020     Positive cardiac stress test  small, mildly severe, partially reversible myocardial perfusion defect of anterior and inferior wall  11/12/2019     Abnormal CT of the chest suspicious for an infectious or inflammatory bronchiolitis. 11/07/2019     Celiac artery stenosis ([facility]) >70% stenosis in the celiac trunk 11/05/2019     Aortoiliac stenosis, left ([facility]) 02/25/2019     Spondylosis of lumbar region without myelopathy or radiculopathy 01/29/2019     Lumbosacral spondylosis without myelopathy 01/29/2019     Statin intolerance 01/22/2019     Lumbar disc herniation 01/22/2019     Herniated lumbar intervertebral disc 01/22/2019     Chronic GERD 12/04/2017     Acute renal failure superimposed on stage 3 chronic kidney disease ([facility]) 12/04/2017     Claudication in peripheral vascular disease ([facility]) 12/04/2017     Gout 12/04/2017     Hx-TIA (transient ischemic attack) 12/04/2017     Hyperlipidemia associated with type 2 diabetes mellitus  ([facility]) 12/04/2017     Hypertension associated with diabetes  ([facility]) 12/04/2017     Osteoarthritis 12/04/2017     Right renal artery stenosis ([facility]) 12/04/2017     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     Hyperlipidemia, unspecified 12/04/2017     Vitamin D deficiency 09/08/2016     Basilar artery stenosis 06/22/2016     Type 2 diabetes mellitus with diabetic nephropathy ([facility]) 12/19/2015     Hypercholesteremia 12/19/2015     Hypertension 12/19/2015     CVA (cerebral vascular accident) ([facility]) 11/09/2015       LOS (days): 2  Geometric Mean LOS (GMLOS) (days): 3.6  Days to GMLOS:1.9     OBJECTIVE:  Risk of Unplanned Readmission Score: 26.17         Current admission status: Inpatient   Preferred Pharmacy:   [id number] [place] - [address]  [address]  [place] [id number]  Phone: [phone] Fax: [phone]    Primary Care Provider: Ritchie Lawton MD    Primary Insurance: [facility]  Secondary Insurance:     DISCHARGE DETAILS:  As per SLIM rounds,  The patient will continue to require additional inpatient hospital stay due to needs further vascular intervention. CM made a blanket referral for VNA and STR. CM continues to follow and assist with pt dc plans.

## 2024-07-18 NOTE — PROGRESS NOTES
[facility]  Progress Note  Name: Anamaria Parnell I  MRN: [id number]  Unit/Bed#: -01 I Date of Admission: 7/16/2024   Date of Service: 7/18/2024 I Hospital Day: 2    Assessment & Plan   Gangrene ([facility])  Assessment & Plan  79 yoF former smoker  with obesity, CKD IV, DM II, HLD, HTN, CAD, renal artery stenosis, mesenteric artery stenosis, AIOD/PAD with chronic right foot wounds, L MCA and PCA CVA s/p L TCAR 9/7/23 ([facility]). Patient with chronic right lower extremity chronic limb threatening ischemia with right great toe gangrene.  Right great toe injury ongoing for months and has been demarcating.  Patient now presents due to increased toe pain. She has been placed on cephalexin for possible soft tissue infection. Vascular surgery is asked to evaluate.    Imaging:  - CTA runoff (P)  -TEA 7/16/24:    R 0.11/--/--; Severe fem-pop disease c HG stenosis/occlusion prox-distal SFA c recon popliteal. Tib/peroneal occlusive disease. Tib/peroneal occlusive disease. Distal AT artery occlusion.    L 0.31/20/19; Severe diffuse fem-pop disease with HG v occlusion prox-mid SFA with recon distal SFA. Tib/Peronea occlusive disease. Entire PT artery HG v occlusion.     C/w 4/2024, R SFA occlusion now extends into the distal SFA and decreased L LISA/TBI    -AOIL 11/7/2023:  Occlusion R distal JACI.     >70% celiac trunk. >60% R renal. L renal not visualized.     LISA R 0.29/--/-- with flat-lined PVR/PPG tracings. L 0.44/82/50    Labs  WBC 12.31 (14.69)  sCr 1.53 (1.79)  BC NG x 24 hrs      Plan:   -CLTI with advanced calcific atherosclerotic occlusive disease and R hallux dry gangrene and pain. Afebrile, non-toxic   -Severe multilevel calcified atherosclerotic occlusive disease involving the right iliac system, right common femoral, profundofemoral and entire SFA, as well as tibioperoneal disease leading to poor perfusion in the foot.  LISA extremely low at 0.11 with flatlined PVR/PPG tracings and poor perfusion  "to the foot which flatlined metatarsal and great toe pressures.  -Recurrent R foot cellulitis which appears improving on antibiotics with cephalexin  -Stable, but worsening R hallux dry gangrene which is much worse than 1 month ago; foot otherwise does not appear to be acutely infected; grossly motor-sensory intact.   -Trend WBC and follow-up blood cultures  -Continue with aspirin and ticagrelor  -CKD,  nephrology following.  -Pain and medical management as per Slim/admitting team  -Local wound care as per podiatry  -Will continue to monitor with you and further recommendations forthcoming hospital course and testing  -Will d/w Dr. Reynaga         Subjective:  OOB in chair, NAD, no complaints. reports pain is improved.   Awaiting CTA results    Vitals:  /56   Pulse 72   Temp 97.7 °F (36.5 °C)   Resp 17   Ht 4' 10\" (1.473 m)   Wt 65.3 kg (144 lb)   LMP  (LMP Unknown)   SpO2 96%   BMI 30.10 kg/m²     I/Os:  I/O last 3 completed shifts:  In: 830 [P.O.:830]  Out: 1550 [Urine:1550]  I/O this shift:  In: 220 [P.O.:220]  Out: -     Lab Results and Cultures:   Lab Results   Component Value Date    WBC 12.31 (H) 07/18/2024    HGB 10.4 (L) 07/18/2024    HCT 32.1 (L) 07/18/2024    MCV 88 07/18/2024     07/18/2024     Lab Results   Component Value Date    GLUCOSE 130 09/07/2023    CALCIUM 8.2 (L) 07/18/2024    K 3.9 07/18/2024    CO2 21 07/18/2024     (H) 07/18/2024    BUN 22 07/18/2024    CREATININE 1.53 (H) 07/18/2024     Lab Results   Component Value Date    INR 1.17 07/16/2024    INR 0.92 04/18/2024    INR 0.96 09/08/2023    PROTIME 15.6 (H) 07/16/2024    PROTIME 12.9 04/18/2024    PROTIME 13.0 09/08/2023        Blood Culture:   Lab Results   Component Value Date    BLOODCX No Growth at 24 hrs. 07/17/2024   ,   Urinalysis:   Lab Results   Component Value Date    COLORU Light Yellow 07/17/2024    CLARITYU Clear 07/17/2024    SPECGRAV 1.016 07/17/2024    PHUR 5.5 07/17/2024    LEUKOCYTESUR " "Moderate (A) 07/17/2024    NITRITE Negative 07/17/2024    GLUCOSEU Negative 07/17/2024    KETONESU Negative 07/17/2024    BILIRUBINUR Negative 07/17/2024    BLOODU Negative 07/17/2024   ,   Urine Culture:   Lab Results   Component Value Date    URINECX Culture too young- will reincubate 07/17/2024   ,   Wound Culure: No results found for: \"WOUNDCULT\"    Medications:  Current Facility-Administered Medications   Medication Dose Route Frequency    acetaminophen (TYLENOL) tablet 975 mg  975 mg Oral Q8H JAVIER    allopurinol (ZYLOPRIM) tablet 100 mg  100 mg Oral Daily    amLODIPine (NORVASC) tablet 5 mg  5 mg Oral Daily    aspirin chewable tablet 81 mg  81 mg Oral Daily    ceFAZolin (ANCEF) IVPB (premix in dextrose) 1,000 mg 50 mL  1,000 mg Intravenous Q12H    cinacalcet (SENSIPAR) tablet 30 mg  30 mg Oral Every Other Day    cloNIDine (CATAPRES-TTS-3) 0.3 mg/24 hr TD weekly patch  1 patch Transdermal Weekly    escitalopram (LEXAPRO) tablet 20 mg  20 mg Oral Daily    heparin (porcine) subcutaneous injection 5,000 Units  5,000 Units Subcutaneous Q8H JAVIER    HYDROmorphone HCl (DILAUDID) injection 0.2 mg  0.2 mg Intravenous Q6H PRN    insulin lispro (HumALOG/ADMELOG) 100 units/mL subcutaneous injection 1-5 Units  1-5 Units Subcutaneous TID AC    labetalol (NORMODYNE) tablet 300 mg  300 mg Oral Q12H JAVIER    lidocaine (LIDODERM) 5 % patch 1 patch  1 patch Topical Daily    losartan (COZAAR) tablet 100 mg  100 mg Oral Daily    ondansetron (ZOFRAN) injection 4 mg  4 mg Intravenous Q6H PRN    oxyCODONE (ROXICODONE) IR tablet 5 mg  5 mg Oral Q4H PRN    oxyCODONE (ROXICODONE) split tablet 2.5 mg  2.5 mg Oral Q4H PRN    pravastatin (PRAVACHOL) tablet 80 mg  80 mg Oral Daily With Dinner    sodium chloride 0.9 % infusion  75 mL/hr Intravenous Continuous    ticagrelor (BRILINTA) tablet 90 mg  90 mg Oral Q12H JAVIER    traZODone (DESYREL) tablet 50 mg  50 mg Oral HS       Imaging:  As above    Physical Exam:    General appearance: alert and " oriented, in no acute distress  Neurologic: Grossly normal  Lungs: clear to auscultation bilaterally  Heart: regular rate and rhythm, S1, S2 normal, no murmur, click, rub or gallop  Abdomen: soft, non-tender; bowel sounds normal; no masses,  no organomegaly  Extremities:  warm, perfused, m/s intact, R great toe dry gangrene    Wound/Incision:            Pulse exam:  DP: Right: non-palpable Left: non-palpable  PT: Right: non-palpable Left: non-palpable      GRACIELA Vogt  7/18/2024  [facility], [phone]

## 2024-07-18 NOTE — ASSESSMENT & PLAN NOTE
PAD with chronic right foot wounds now with right great toe gangrene.    Vascular surgery on board  On asa / brillinta / statin for now

## 2024-07-18 NOTE — PROGRESS NOTES
[facility]  Progress Note  Name: Anamaria Parnell I  MRN: [id number]  Unit/Bed#: -01 I Date of Admission: 7/16/2024   Date of Service: 7/18/2024 I Hospital Day: 2    Assessment & Plan   * Sepsis (HCC)  Assessment & Plan  Sepsis, likely present on admission, due to leukocytosis, hypothermia with suspected soft tissue infection of toe   On IV Cefazolin  S/p 1L IVF Bolus, now on maintenance fluids  Fu on blood c/s  f/u on urine cultures.   Monitor wbc, any e/o fever        Gangrene (HCC)  Assessment & Plan  79 yoF former smoker and vasculopath with DM II,CAD,PAD with chronic right foot wounds now with right great toe gangrene.  Right great toe pain ongoing for months and has been worsening with increased toe pain & black discoloration and foul smell.    Started iv ancef for soft tissue infection  Pain management : tylenol, prn oxy, dilaudid (cautious with use due to her CKD   Vascular surgery on board, proceed with CTA abdomen with run off to further delineate her anatomy and decide upon vascular options.   Podiatry consulted. Await recommendations      Acute renal failure superimposed on stage 3 chronic kidney disease (HCC)  Assessment & Plan  Lab Results   Component Value Date    EGFR 32 07/18/2024    EGFR 26 07/17/2024    EGFR 21 07/16/2024    CREATININE 1.53 (H) 07/18/2024    CREATININE 1.79 (H) 07/17/2024    CREATININE 2.19 (H) 07/16/2024   Admitted with acute on chronic kidney disease.  (Baseline  1.2-1.8)  Admitted with creatinine 2.19.   On gentle hydration. Improving, creatinine 1.53 this am  Nephrology consulted in anticipation of vascular procedure     Aortoiliac occlusive disease (HCC)  Assessment & Plan  PAD with chronic right foot wounds now with right great toe gangrene.    Vascular surgery on board  On asa / brillinta / statin for now    Type 2 diabetes mellitus with diabetic nephropathy (HCC)  Assessment & Plan  Lab Results   Component Value Date    HGBA1C 5.9 (H)  07/09/2024       Recent Labs     07/17/24  1543 07/17/24  2148 07/18/24  0607 07/18/24  1041   POCGLU 89 105 104 119         Blood Sugar Average: Last 72 hrs:  (P) 112  Hold home Tradjenta during hospitalization  Patient placed on sliding scale coverage with ACHS checks  A1c notes good control of blood sugar [5.9]  Hypoglycemia protocol.    Basilar artery stenosis  Assessment & Plan  Patient with a history of carotid surgery.    Follows with Dr. Gallo             VTE Pharmacologic Prophylaxis: VTE Score: 5 heparin    Mobility:   Basic Mobility Inpatient Raw Score: 15  JH-HLM Goal: 4: Move to chair/commode  JH-HLM Achieved: 4: Move to chair/commode  JH-HLM Goal NOT achieved. Continue with multidisciplinary rounding and encourage appropriate mobility to improve upon JH-HLM goals.    Patient Centered Rounds: I performed bedside rounds with nursing staff today.   Discussions with Specialists or Other Care Team Provider: vascular    Education and Discussions with Family / Patient: Updated  (son) at bedside.    Total Time Spent on Date of Encounter in care of patient: 35 mins. This time was spent on one or more of the following: performing physical exam; counseling and coordination of care; obtaining or reviewing history; documenting in the medical record; reviewing/ordering tests, medications or procedures; communicating with other healthcare professionals and discussing with patient's family/caregivers.    Current Length of Stay: 2 day(s)  Current Patient Status: Inpatient   Certification Statement: The patient will continue to require additional inpatient hospital stay due to needs further vascular intervention  Discharge Plan: undetermined    Code Status: Level 3 - DNAR and DNI    Subjective:   Patient seen at bedside . This am she reports she does not want any further vascular intervention.  She understands she has bad peripheral arterial disease and will remain high risk for surgery.  She is willing to  proceed with the CTA and take an informed decision once aware of her vascular options.    Objective:     Vitals:   Temp (24hrs), Av.7 °F (36.5 °C), Min:97.2 °F (36.2 °C), Max:98.1 °F (36.7 °C)    Temp:  [97.2 °F (36.2 °C)-98.1 °F (36.7 °C)] 97.7 °F (36.5 °C)  HR:  [65-73] 72  Resp:  [14-20] 17  BP: ()/(53-74) 149/56  SpO2:  [96 %-97 %] 96 %  Body mass index is 30.1 kg/m².     Input and Output Summary (last 24 hours):     Intake/Output Summary (Last 24 hours) at 2024 1124  Last data filed at 2024 0720  Gross per 24 hour   Intake 685 ml   Output 925 ml   Net -240 ml       Physical Exam:   Physical Exam  Constitutional:       General: She is not in acute distress.     Appearance: She is obese. She is not toxic-appearing.   HENT:      Mouth/Throat:      Mouth: Mucous membranes are moist.      Pharynx: Oropharynx is clear.   Cardiovascular:      Rate and Rhythm: Normal rate and regular rhythm.   Pulmonary:      Effort: Pulmonary effort is normal. No respiratory distress.      Breath sounds: Normal breath sounds.   Abdominal:      General: Abdomen is flat. Bowel sounds are normal.      Palpations: Abdomen is soft.   Neurological:      General: No focal deficit present.      Mental Status: She is alert and oriented to person, place, and time.         Additional Data:     Labs:  Results from last 7 days   Lab Units 24  0537   WBC Thousand/uL 12.31*   HEMOGLOBIN g/dL 10.4*   HEMATOCRIT % 32.1*   PLATELETS Thousands/uL 333   SEGS PCT % 83*   LYMPHO PCT % 5*   MONO PCT % 8   EOS PCT % 2     Results from last 7 days   Lab Units 24  0537 24  0512 24  1738   SODIUM mmol/L 140   < > 140   POTASSIUM mmol/L 3.9   < > 4.4   CHLORIDE mmol/L 109*   < > 104   CO2 mmol/L 21   < > 21   BUN mg/dL 22   < > 35*   CREATININE mg/dL 1.53*   < > 2.19*   ANION GAP mmol/L 10   < > 15*   CALCIUM mg/dL 8.2*   < > 8.8   ALBUMIN g/dL  --   --  3.8   TOTAL BILIRUBIN mg/dL  --   --  0.38   ALK PHOS U/L  --    --  96   ALT U/L  --   --  4*   AST U/L  --   --  7*   GLUCOSE RANDOM mg/dL 108   < > 106    < > = values in this interval not displayed.     Results from last 7 days   Lab Units 07/16/24  1738   INR  1.17     Results from last 7 days   Lab Units 07/18/24  1041 07/18/24  0607 07/17/24  2148 07/17/24  1543 07/17/24  1052 07/17/24  0628   POC GLUCOSE mg/dl 119 104 105 89 120 135               Lines/Drains:  Invasive Devices       Peripheral Intravenous Line  Duration             Peripheral IV 07/16/24 Left Antecubital 1 day                      Recent Cultures (last 7 days):   Results from last 7 days   Lab Units 07/17/24  0220 07/17/24  0156   BLOOD CULTURE   --  No Growth at 24 hrs.   URINE CULTURE  Culture too young- will reincubate  --        Last 24 Hours Medication List:   Current Facility-Administered Medications   Medication Dose Route Frequency Provider Last Rate    acetaminophen  975 mg Oral Q8H [facility] Des Longoria MD      allopurinol  100 mg Oral Daily Des Longoria MD      amLODIPine  5 mg Oral Daily Des Longoria MD      aspirin  81 mg Oral Daily Des Longoria MD      cefazolin  1,000 mg Intravenous Q12H Des Longoria MD 1,000 mg (07/18/24 1031)    cinacalcet  30 mg Oral Every Other Day Des Longoria MD      cloNIDine  1 patch Transdermal Weekly Des Longoria MD      escitalopram  20 mg Oral Daily Des Longoria MD      heparin (porcine)  5,000 Units Subcutaneous Q8H [facility] Des Longoria MD      HYDROmorphone  0.2 mg Intravenous Q6H PRN Des Longroia MD      insulin lispro  1-5 Units Subcutaneous TID AC Grzegorz Voss MD      labetalol  300 mg Oral Q12H [facility] Des Longoria MD      lidocaine  1 patch Topical Daily Des Longoria MD      losartan  100 mg Oral Daily Des Longoria MD      ondansetron  4 mg Intravenous Q6H PRN Des Longoria MD      oxyCODONE  5 mg Oral Q4H PRN Des Longoria MD      oxyCODONE  2.5 mg Oral Q4H PRN Des Maurilio  MD Von      pravastatin  80 mg Oral Daily With Dinner Des Longoria MD      sodium chloride  75 mL/hr Intravenous Continuous Des Longoria MD 75 mL/hr (07/17/24 1153)    ticagrelor  90 mg Oral Q12H JAVIER Des Longoria MD      traZODone  50 mg Oral HS Des Longoria MD          Today, Patient Was Seen By: Grzegorz Voss MD    **Please Note: This note may have been constructed using a voice recognition system.**

## 2024-07-18 NOTE — ASSESSMENT & PLAN NOTE
Lab Results   Component Value Date    HGBA1C 5.9 (H) 07/09/2024       Recent Labs     07/17/24  1543 07/17/24  2148 07/18/24  0607 07/18/24  1041   POCGLU 89 105 104 119         Blood Sugar Average: Last 72 hrs:  (P) 112  Hold home Tradjenta during hospitalization  Patient placed on sliding scale coverage with ACHS checks  A1c notes good control of blood sugar [5.9]  Hypoglycemia protocol.   hand grasp, leg strength strong and equal bilaterally

## 2024-07-18 NOTE — ASSESSMENT & PLAN NOTE
79 yoF former smoker and vasculopath with DM II,CAD,PAD with chronic right foot wounds now with right great toe gangrene.  Right great toe pain ongoing for months and has been worsening with increased toe pain & black discoloration and foul smell.    Started iv ancef for soft tissue infection  Pain management : tylenol, prn oxy, dilaudid (cautious with use due to her CKD   Vascular surgery on board, proceed with CTA abdomen with run off to further delineate her anatomy and decide upon vascular options.   Podiatry consulted. Await recommendations     07-Dec-2019 20:12

## 2024-07-18 NOTE — CONSULTS
Consultation - Podiatric Surgery    Anamaria Parnell 79 y.o. female MRN: [id number]  Unit/Bed#: -01 Encounter: [id number]      Assessment & Plan     Assessment:  79-year-old female presenting with dry gangrene and critical limb ischemia to the right hallux.    Plan:  -Patient seen and examined at bedside this morning.  Clinical images are consistent with today's presentation.  -Right hallux dry gangrene appears clinically stable.  Concern for critical limb ischemia as mentioned by vascular surgery.  Pending CTA results and further vascular workup for healing potential of the right hallux.  -Will defer to all vascular recommendations, awaiting healing potential.  -Podiatry will follow peripherally, pending vascular recommendations.  There will be no podiatric surgery or hallux amputation unless confirmed by vascular of healing potential.  -Continue local wound care of the Betadine paint daily.    Thank you for this consultation.    History of Present Illness   Physician Requesting Consult: Grzegorz Voss MD  Reason for Consult / Principal Problem: Right hallux dry gangrene  HPI: Anamaria Parnell is a 79 y.o. year old female who presents with chronic right great toe wound, presenting today in the hospital with dry gangrene.  Patient states she follows with podiatry but cannot recall the name of the podiatrist she has been following.  Pending vascular recommendations, however vascular workup considering critical limb ischemia and concern of healing to the right foot.  Patient reports increased pain to the right foot over the past few weeks to the point of unbearable that he.  This is the reason for patient's admission.    Inpatient consult to Podiatry  Consult performed by: Narciso Persaud DPM  Consult ordered by: Des Longoria MD          Review of Systems    Historical Information   Past Medical History:   Diagnosis Date    Aphasia as late effect of cerebrovascular accident (CVA) 08/16/2023    Arthritis      Chronic kidney disease     Diabetes mellitus (HCC)     Embolism and 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 ([facility]) 01/20/2021    Endometriosis     High cholesterol     History of left common carotid artery stent placement 10/27/2023    Hypertension     Kidney disease, chronic, stage III (moderate, EGFR 30-59 ml/min) ([facility])     Lumbar disc herniation     Median arcuate ligament syndrome ([facility]) 11/05/2019    Neuropathy     Obesity (BMI 30-39.9) 12/04/2017    Obesity, morbid ([facility]) 01/20/2021    Peripheral vascular disease ([facility])     Pneumonia     Shoulder injury     left    Spinal stenosis     Stroke ([facility]) 2015    Memory loss     Past Surgical History:   Procedure Laterality Date    CARDIAC CATHETERIZATION      CAROTID STENT Left 9/7/2023    Procedure: L TCAR;  Surgeon: Nicole Gallo MD;  Location: [facility];  Service: Vascular    COLONOSCOPY      FL RETROGRADE PYELOGRAM  4/6/2020    FRACTURE SURGERY Right     ankle    IR CAROTID STENT  9/7/2023    OVARIAN CYST SURGERY      ND CYSTO BLADDER W/URETERAL CATHETERIZATION Bilateral 4/6/2020    Procedure: CYSTOSCOPY WITH RETROGRADE PYELOGRAM;  Surgeon: Robert Mitchell MD;  Location: [facility];  Service: Urology    ND CYSTO W/INSERT URETERAL STENT Right 4/6/2020    Procedure: INSERTION STENT URETERAL;  Surgeon: Robert Mitchell MD;  Location[facility];  Service: Urology    ND CYSTO W/REMOVAL OF TUMORS SMALL N/A 4/6/2020    Procedure: TRANSURETHRAL RESECTION OF BLADDER TUMOR (TURBT);  Surgeon: Robert Mitchell MD;  Location[facility];  Service: Urology    ROTATOR CUFF REPAIR Left     TONSILLECTOMY       Social History   Social History     Substance and Sexual Activity   Alcohol Use Never     Social History     Substance and Sexual Activity   Drug Use Never     E-Cigarette/Vaping    E-Cigarette Use Never User      E-Cigarette/Vaping Substances    Nicotine No     THC No     CBD No     Flavoring No     Other No     Unknown No      Social History     Tobacco Use  "  Smoking Status Former    Current packs/day: 0.00    Average packs/day: 2.0 packs/day for 54.6 years (109.1 ttl pk-yrs)    Types: Cigarettes    Start date: 1/1/1966    Quit date: 7/27/2020    Years since quitting: 3.9   Smokeless Tobacco Never     Family History: non-contributory    Meds/Allergies   all current active meds have been reviewed  Allergies   Allergen Reactions    Fenofibrate Other (See Comments)      blood in urine  hx  Kidney Failure    Colesevelam Other (See Comments)      leg pains    Colestipol Itching and Other (See Comments)      Swelling lower legs    Ezetimibe GI Intolerance    Statins Myalgia       Objective   Vitals: Blood pressure 149/56, pulse 72, temperature 97.7 °F (36.5 °C), resp. rate 17, height 4' 10\" (1.473 m), weight 65.3 kg (144 lb), SpO2 96%.,Body mass index is 30.1 kg/m².      Intake/Output Summary (Last 24 hours) at 7/18/2024 1128  Last data filed at 7/18/2024 0720  Gross per 24 hour   Intake 685 ml   Output 925 ml   Net -240 ml     I/O last 24 hours:  In: 830 [P.O.:830]  Out: 1150 [Urine:1150]    Invasive Devices       Peripheral Intravenous Line  Duration             Peripheral IV 07/16/24 Left Antecubital 1 day                    Physical Exam  Ortho Exam    Vascular:   -DP pulse is non-palpable B/L, PT pulse is palpable B/L   -Capillary refill time is less than 3 seconds B/L  -Pedal hair growth is diminished B/L  -Temperature is warm to cool from ankle to toes B/L   -There is mild edema noted to the B/L LE    Neuro:  -Light touch and protective sensation is diminished    Ortho:  -MMT is 5/5 for eversion, inversion, plantarflexion, dorsiflexion  -No pain on palpation noted.     Derm:  Right hallux dry gangrene.  Malodor present.  There is no purulent drainage expressed.  No bone exposed.  Minimal periwound erythema.      Lab Results:   Results from last 7 days   Lab Units 07/18/24  0537   WBC Thousand/uL 12.31*   HEMOGLOBIN g/dL 10.4*   HEMATOCRIT % 32.1*   PLATELETS " Thousands/uL 333   SEGS PCT % 83*   LYMPHO PCT % 5*   MONO PCT % 8   EOS PCT % 2       Imaging Studies: I have personally reviewed pertinent reports.        Code Status: Level 3 - DNAR and DNI    Narciso Persaud DPM  [facility]  [address], [place]

## 2024-07-18 NOTE — ASSESSMENT & PLAN NOTE
79 yoF former smoker  with obesity, CKD IV, DM II, HLD, HTN, CAD, renal artery stenosis, mesenteric artery stenosis, AIOD/PAD with chronic right foot wounds, L MCA and PCA CVA s/p L TCAR 9/7/23 ([facility]). Patient with chronic right lower extremity chronic limb threatening ischemia with right great toe gangrene.  Right great toe injury ongoing for months and has been demarcating.  Patient now presents due to increased toe pain. She has been placed on cephalexin for possible soft tissue infection. Vascular surgery is asked to evaluate.    Imaging:  - CTA runoff (P)  -TEA 7/16/24:    R 0.11/--/--; Severe fem-pop disease c HG stenosis/occlusion prox-distal SFA c recon popliteal. Tib/peroneal occlusive disease. Tib/peroneal occlusive disease. Distal AT artery occlusion.    L 0.31/20/19; Severe diffuse fem-pop disease with HG v occlusion prox-mid SFA with recon distal SFA. Tib/Peronea occlusive disease. Entire PT artery HG v occlusion.     C/w 4/2024, R SFA occlusion now extends into the distal SFA and decreased L LISA/TBI    -AOIL 11/7/2023:  Occlusion R distal JACI.     >70% celiac trunk. >60% R renal. L renal not visualized.     LISA R 0.29/--/-- with flat-lined PVR/PPG tracings. L 0.44/82/50    Labs  WBC 12.31 (14.69)  sCr 1.53 (1.79)  BC NG x 24 hrs      Plan:   -CLTI with advanced calcific atherosclerotic occlusive disease and R hallux dry gangrene and pain. Afebrile, non-toxic   -Severe multilevel calcified atherosclerotic occlusive disease involving the right iliac system, right common femoral, profundofemoral and entire SFA, as well as tibioperoneal disease leading to poor perfusion in the foot.  LISA extremely low at 0.11 with flatlined PVR/PPG tracings and poor perfusion to the foot which flatlined metatarsal and great toe pressures.  -Recurrent R foot cellulitis which appears improving on antibiotics with cephalexin  -Stable, but worsening R hallux dry gangrene which is much worse than 1 month ago; foot otherwise  does not appear to be acutely infected; grossly motor-sensory intact.   -Trend WBC and follow-up blood cultures  -Continue with aspirin and ticagrelor  -CKD,  nephrology following.  -Pain and medical management as per Slim/admitting team  -Local wound care as per podiatry  -Will continue to monitor with you and further recommendations forthcoming hospital course and testing  -Will d/w Dr. Reynaga

## 2024-07-18 NOTE — PLAN OF CARE
Problem: Potential for Falls  Goal: Patient will remain free of falls  Description: INTERVENTIONS:  - Educate patient/family on patient safety including physical limitations  - Instruct patient to call for assistance with activity   - Consult OT/PT to assist with strengthening/mobility   - Keep Call bell within reach  - Keep bed low and locked with side rails adjusted as appropriate  - Keep care items and personal belongings within reach  - Initiate and maintain comfort rounds  - Make Fall Risk Sign visible to staff  - Offer Toileting every 2 Hours, in advance of need  - Initiate/Maintain bed/chair alarm  - Obtain necessary fall risk management equipment:   - Apply yellow socks and bracelet for high fall risk patients  - Consider moving patient to room near nurses station  Outcome: Progressing     Problem: GENITOURINARY - ADULT  Goal: Absence of urinary retention  Description: INTERVENTIONS:  - Assess patient’s ability to void and empty bladder  - Monitor I/O  - Bladder scan as needed  - Discuss with physician/AP medications to alleviate retention as needed  - Discuss catheterization for long term situations as appropriate  Outcome: Progressing     Problem: METABOLIC, FLUID AND ELECTROLYTES - ADULT  Goal: Electrolytes maintained within normal limits  Description: INTERVENTIONS:  - Monitor labs and assess patient for signs and symptoms of electrolyte imbalances  - Administer electrolyte replacement as ordered  - Monitor response to electrolyte replacements, including repeat lab results as appropriate  - Instruct patient on fluid and nutrition as appropriate  Outcome: Progressing  Goal: Fluid balance maintained  Description: INTERVENTIONS:  - Monitor labs   - Monitor I/O and WT  - Instruct patient on fluid and nutrition as appropriate  - Assess for signs & symptoms of volume excess or deficit  Outcome: Progressing  Goal: Glucose maintained within target range  Description: INTERVENTIONS:  - Monitor Blood Glucose as  ordered  - Assess for signs and symptoms of hyperglycemia and hypoglycemia  - Administer ordered medications to maintain glucose within target range  - Assess nutritional intake and initiate nutrition service referral as needed  Outcome: Progressing     Problem: SKIN/TISSUE INTEGRITY - ADULT  Goal: Incision(s), wounds(s) or drain site(s) healing without S/S of infection  Description: INTERVENTIONS  - Assess and document dressing, incision, wound bed, drain sites and surrounding tissue  - Provide patient and family education  - Perform skin care/dressing changes every   Outcome: Progressing  Goal: Pressure injury heals and does not worsen  Description: Interventions:  - Implement low air loss mattress or specialty surface (Criteria met)  - Apply silicone foam dressing  - Instruct/assist with weight shifting every  minutes when in chair   - Limit chair time to 1 hour intervals  - Use special pressure reducing interventions such as cushion when in chair   - Apply fecal or urinary incontinence containment device   - Perform passive or active ROM every 2  - Turn and reposition patient & offload bony prominences every 2 hours   - Utilize friction reducing device or surface for transfers   - Consider consults to  interdisciplinary teams such as   - Use incontinent care products after each incontinent episode such as   - Consider nutrition services referral as needed  Outcome: Progressing     Problem: PAIN - ADULT  Goal: Verbalizes/displays adequate comfort level or baseline comfort level  Description: Interventions:  - Encourage patient to monitor pain and request assistance  - Assess pain using appropriate pain scale  - Administer analgesics based on type and severity of pain and evaluate response  - Implement non-pharmacological measures as appropriate and evaluate response  - Consider cultural and social influences on pain and pain management  - Notify physician/advanced practitioner if interventions unsuccessful or  patient reports new pain  Outcome: Progressing     Problem: INFECTION - ADULT  Goal: Absence or prevention of progression during hospitalization  Description: INTERVENTIONS:  - Assess and monitor for signs and symptoms of infection  - Monitor lab/diagnostic results  - Monitor all insertion sites, i.e. indwelling lines, tubes, and drains  - Monitor endotracheal if appropriate and nasal secretions for changes in amount and color  - [place] appropriate cooling/warming therapies per order  - Administer medications as ordered  - Instruct and encourage patient and family to use good hand hygiene technique  - Identify and instruct in appropriate isolation precautions for identified infection/condition  Outcome: Progressing     Problem: SAFETY ADULT  Goal: Patient will remain free of falls  Description: INTERVENTIONS:  - Educate patient/family on patient safety including physical limitations  - Instruct patient to call for assistance with activity   - Consult OT/PT to assist with strengthening/mobility   - Keep Call bell within reach  - Keep bed low and locked with side rails adjusted as appropriate  - Keep care items and personal belongings within reach  - Initiate and maintain comfort rounds  - Make Fall Risk Sign visible to staff  - Offer Toileting every 2 Hours, in advance of need  - Initiate/Maintain bed/chair alarm  - Obtain necessary fall risk management equipment:   - Apply yellow socks and bracelet for high fall risk patients  - Consider moving patient to room near nurses station  Outcome: Progressing     Problem: Prexisting or High Potential for Compromised Skin Integrity  Goal: Skin integrity is maintained or improved  Description: INTERVENTIONS:  - Identify patients at risk for skin breakdown  - Assess and monitor skin integrity  - Assess and monitor nutrition and hydration status  - Monitor labs   - Assess for incontinence   - Turn and reposition patient  - Assist with mobility/ambulation  - Relieve pressure  over bony prominences  - Avoid friction and shearing  - Provide appropriate hygiene as needed including keeping skin clean and dry  - Evaluate need for skin moisturizer/barrier cream  - Collaborate with interdisciplinary team   - Patient/family teaching  - Consider wound care consult   Outcome: Progressing

## 2024-07-18 NOTE — PROGRESS NOTES
NEPHROLOGY PROGRESS NOTE    Patient: Anamaria Parnell               Sex: female          DOA: 7/16/2024  5:12 PM   YOB: 1945        Age:  79 y.o.        LOS:  LOS: 2 days   7/18/2024    REASON FOR THE CONSULTATION:      Acute kidney injury on chronic kidney disease stage IIIb/IV    SUBJECTIVE     Patient seen and examined next at the bedside.  Sitting up in chair and feels well.  Scheduled for CTA of lower extremities this morning.    CURRENT MEDICATIONS       Current Facility-Administered Medications:     acetaminophen (TYLENOL) tablet 975 mg, 975 mg, Oral, Q8H JAVIER, Des Longoria MD, 975 mg at 07/18/24 0434    allopurinol (ZYLOPRIM) tablet 100 mg, 100 mg, Oral, Daily, Des Longoria MD, 100 mg at 07/18/24 1029    amLODIPine (NORVASC) tablet 5 mg, 5 mg, Oral, Daily, Des Longoria MD, 5 mg at 07/18/24 1029    aspirin chewable tablet 81 mg, 81 mg, Oral, Daily, Des Longoria MD, 81 mg at 07/18/24 1028    ceFAZolin (ANCEF) IVPB (premix in dextrose) 1,000 mg 50 mL, 1,000 mg, Intravenous, Q12H, Des Longoria MD, Last Rate: 100 mL/hr at 07/18/24 1031, 1,000 mg at 07/18/24 1031    cinacalcet (SENSIPAR) tablet 30 mg, 30 mg, Oral, Every Other Day, Des Longoria MD, 30 mg at 07/18/24 1028    cloNIDine (CATAPRES-TTS-3) 0.3 mg/24 hr TD weekly patch, 1 patch, Transdermal, Weekly, Des Longoria MD    escitalopram (LEXAPRO) tablet 20 mg, 20 mg, Oral, Daily, Des Longoria MD, 20 mg at 07/18/24 1028    heparin (porcine) subcutaneous injection 5,000 Units, 5,000 Units, Subcutaneous, Q8H JAVIER, 5,000 Units at 07/18/24 0434 **AND** [CANCELED] Platelet count, , , Once, Des Longoria MD    HYDROmorphone HCl (DILAUDID) injection 0.2 mg, 0.2 mg, Intravenous, Q6H PRN, Des Longoria MD    insulin lispro (HumALOG/ADMELOG) 100 units/mL subcutaneous injection 1-5 Units, 1-5 Units, Subcutaneous, TID AC **AND** Fingerstick Glucose (POCT), , , TID AC, Grzegorz Voss MD    labetalol  (NORMODYNE) tablet 300 mg, 300 mg, Oral, Q12H JAVIER, Des Longoria MD, 300 mg at 07/18/24 1028    lidocaine (LIDODERM) 5 % patch 1 patch, 1 patch, Topical, Daily, Des Longoria MD    losartan (COZAAR) tablet 100 mg, 100 mg, Oral, Daily, Des Longoria MD, 100 mg at 07/18/24 1028    ondansetron (ZOFRAN) injection 4 mg, 4 mg, Intravenous, Q6H PRN, Des Longoria MD    oxyCODONE (ROXICODONE) IR tablet 5 mg, 5 mg, Oral, Q4H PRN, Des Longoria MD    oxyCODONE (ROXICODONE) split tablet 2.5 mg, 2.5 mg, Oral, Q4H PRN, Des Longoria MD    pravastatin (PRAVACHOL) tablet 80 mg, 80 mg, Oral, Daily With Dinner, Des Longoria MD, 80 mg at 07/17/24 1614    sodium chloride 0.9 % infusion, 75 mL/hr, Intravenous, Continuous, Des Longoria MD, Last Rate: 75 mL/hr at 07/17/24 1153, 75 mL/hr at 07/17/24 1153    ticagrelor (BRILINTA) tablet 90 mg, 90 mg, Oral, Q12H JAVIER, Des Longoria MD, 90 mg at 07/18/24 1029    traZODone (DESYREL) tablet 50 mg, 50 mg, Oral, HS, Des Longoria MD, 50 mg at 07/17/24 2124    REVIEW OF SYSTEMS     Review of Systems   Constitutional: Negative.    HENT: Negative.     Eyes: Negative.    Respiratory: Negative.     Cardiovascular: Negative.    Gastrointestinal: Negative.    Endocrine: Negative.    Genitourinary: Negative.    Musculoskeletal: Negative.    Skin:  Positive for color change and wound.   Allergic/Immunologic: Negative.    Neurological: Negative.    Hematological: Negative.    All other systems reviewed and are negative.      OBJECTIVE     Current Weight: Weight - Scale: 65.3 kg (144 lb)  Vitals:    07/18/24 0720   BP: 149/56   Pulse: 72   Resp: 17   Temp: 97.7 °F (36.5 °C)   SpO2: 96%     Body mass index is 30.1 kg/m².    Intake/Output Summary (Last 24 hours) at 7/18/2024 1127  Last data filed at 7/18/2024 0720  Gross per 24 hour   Intake 685 ml   Output 925 ml   Net -240 ml       PHYSICAL EXAMINATION     Physical Exam  Constitutional:       Appearance:  She is well-developed.   HENT:      Head: Normocephalic and atraumatic.   Eyes:      Pupils: Pupils are equal, round, and reactive to light.   Cardiovascular:      Rate and Rhythm: Normal rate and regular rhythm.      Heart sounds: Normal heart sounds.   Pulmonary:      Effort: Pulmonary effort is normal.   Abdominal:      General: Bowel sounds are normal.      Palpations: Abdomen is soft.   Musculoskeletal:         General: Deformity present. Normal range of motion.      Cervical back: Neck supple.   Skin:     General: Skin is warm.      Findings: Lesion present.   Neurological:      Mental Status: She is alert and oriented to person, place, and time.           LAB RESULTS     Results from last 7 days   Lab Units 07/18/24  0537 07/17/24  0512 07/17/24  0156 07/16/24  1738   WBC Thousand/uL 12.31* 14.69* 16.56* 14.08*   HEMOGLOBIN g/dL 10.4* 9.7* 11.3* 11.1*   HEMATOCRIT % 32.1* 30.4* 33.4* 33.5*   PLATELETS Thousands/uL 333 342 359 397*   POTASSIUM mmol/L 3.9 3.9  --  4.4   CHLORIDE mmol/L 109* 106  --  104   CO2 mmol/L 21 20*  --  21   BUN mg/dL 22 31*  --  35*   CREATININE mg/dL 1.53* 1.79*  --  2.19*   EGFR ml/min/1.73sq m 32 26  --  21   CALCIUM mg/dL 8.2* 7.6*  --  8.8           RADIOLOGY RESULTS        IMPRESSION:     No acute cardiopulmonary disease.       ASSESSMENT/PLAN     79 years old female with past medical history of chronic kidney disease stage IIIb/IV, diabetes mellitus type 2, CVA, peripheral vascular disease, carotid stenosis, obesity, hypertension who presented to our facility with right toe gangrene.    1.  Acute kidney injury on chronic kidney disease stage IIIb/IV: Baseline serum creatinine has fluctuated between 1.3-1.8.  Presented to our facility with a creatinine of 2.19 mg/dL and elevated.  Etiology thought to be secondary to sepsis from soft tissue infection being the likely source.  Patient received supportive treatment with IV antibiotics, IV fluids resulting in improvement in  creatinine to 1.5 mg/dL today.  Patient is currently undergoing CT angiogram and was prepped with pre and post fluids to prevent contrast-induced nephropathy.    2.  Gangrene of right toe: Likely due to underlying peripheral vascular disease.  Lower extremity arterial duplex study revealing significant stenosis.  Patient had opted not to undergo any procedure during this hospitalization.    3.  Sepsis: Presented with leukocytosis, hypothermia with likely etiology being soft tissue infection.  Receiving IV cefazolin.          Radha Martinez MD  Nephrology  7/18/2024

## 2024-07-18 NOTE — PLAN OF CARE
Problem: Potential for Falls  Goal: Patient will remain free of falls  Description: INTERVENTIONS:  - Educate patient/family on patient safety including physical limitations  - Instruct patient to call for assistance with activity   - Consult OT/PT to assist with strengthening/mobility   - Keep Call bell within reach  - Keep bed low and locked with side rails adjusted as appropriate  - Keep care items and personal belongings within reach  - Initiate and maintain comfort rounds  - Make Fall Risk Sign visible to staff  - Offer Toileting every  Hours, in advance of need  - Initiate/Maintain alarm  - Obtain necessary fall risk management equipment:   - Apply yellow socks and bracelet for high fall risk patients  - Consider moving patient to room near nurses station  Outcome: Progressing     Problem: GENITOURINARY - ADULT  Goal: Absence of urinary retention  Description: INTERVENTIONS:  - Assess patient’s ability to void and empty bladder  - Monitor I/O  - Bladder scan as needed  - Discuss with physician/AP medications to alleviate retention as needed  - Discuss catheterization for long term situations as appropriate  Outcome: Progressing     Problem: METABOLIC, FLUID AND ELECTROLYTES - ADULT  Goal: Electrolytes maintained within normal limits  Description: INTERVENTIONS:  - Monitor labs and assess patient for signs and symptoms of electrolyte imbalances  - Administer electrolyte replacement as ordered  - Monitor response to electrolyte replacements, including repeat lab results as appropriate  - Instruct patient on fluid and nutrition as appropriate  Outcome: Progressing  Goal: Fluid balance maintained  Description: INTERVENTIONS:  - Monitor labs   - Monitor I/O and WT  - Instruct patient on fluid and nutrition as appropriate  - Assess for signs & symptoms of volume excess or deficit  Outcome: Progressing  Goal: Glucose maintained within target range  Description: INTERVENTIONS:  - Monitor Blood Glucose as ordered  -  Assess for signs and symptoms of hyperglycemia and hypoglycemia  - Administer ordered medications to maintain glucose within target range  - Assess nutritional intake and initiate nutrition service referral as needed  Outcome: Progressing     Problem: SKIN/TISSUE INTEGRITY - ADULT  Goal: Incision(s), wounds(s) or drain site(s) healing without S/S of infection  Description: INTERVENTIONS  - Assess and document dressing, incision, wound bed, drain sites and surrounding tissue  - Provide patient and family education  - Perform skin care/dressing changes every   Outcome: Progressing  Goal: Pressure injury heals and does not worsen  Description: Interventions:  - Implement low air loss mattress or specialty surface (Criteria met)  - Apply silicone foam dressing  - Instruct/assist with weight shifting every  minutes when in chair   - Limit chair time to  hour intervals  - Use special pressure reducing interventions such as  when in chair   - Apply fecal or urinary incontinence containment device   - Perform passive or active ROM every   - Turn and reposition patient & offload bony prominences every  hours   - Utilize friction reducing device or surface for transfers   - Consider consults to  interdisciplinary teams such as   - Use incontinent care products after each incontinent episode such as   - Consider nutrition services referral as needed  Outcome: Progressing     Problem: PAIN - ADULT  Goal: Verbalizes/displays adequate comfort level or baseline comfort level  Description: Interventions:  - Encourage patient to monitor pain and request assistance  - Assess pain using appropriate pain scale  - Administer analgesics based on type and severity of pain and evaluate response  - Implement non-pharmacological measures as appropriate and evaluate response  - Consider cultural and social influences on pain and pain management  - Notify physician/advanced practitioner if interventions unsuccessful or patient reports new  pain  Outcome: Progressing     Problem: INFECTION - ADULT  Goal: Absence or prevention of progression during hospitalization  Description: INTERVENTIONS:  - Assess and monitor for signs and symptoms of infection  - Monitor lab/diagnostic results  - Monitor all insertion sites, i.e. indwelling lines, tubes, and drains  - Monitor endotracheal if appropriate and nasal secretions for changes in amount and color  - [place] appropriate cooling/warming therapies per order  - Administer medications as ordered  - Instruct and encourage patient and family to use good hand hygiene technique  - Identify and instruct in appropriate isolation precautions for identified infection/condition  Outcome: Progressing     Problem: SAFETY ADULT  Goal: Patient will remain free of falls  Description: INTERVENTIONS:  - Educate patient/family on patient safety including physical limitations  - Instruct patient to call for assistance with activity   - Consult OT/PT to assist with strengthening/mobility   - Keep Call bell within reach  - Keep bed low and locked with side rails adjusted as appropriate  - Keep care items and personal belongings within reach  - Initiate and maintain comfort rounds  - Make Fall Risk Sign visible to staff  - Offer Toileting every  Hours, in advance of need  - Initiate/Maintain alarm  - Obtain necessary fall risk management equipment  - Apply yellow socks and bracelet for high fall risk patients  - Consider moving patient to room near nurses station  Outcome: Progressing     Problem: Prexisting or High Potential for Compromised Skin Integrity  Goal: Skin integrity is maintained or improved  Description: INTERVENTIONS:  - Identify patients at risk for skin breakdown  - Assess and monitor skin integrity  - Assess and monitor nutrition and hydration status  - Monitor labs   - Assess for incontinence   - Turn and reposition patient  - Assist with mobility/ambulation  - Relieve pressure over bony prominences  - Avoid  friction and shearing  - Provide appropriate hygiene as needed including keeping skin clean and dry  - Evaluate need for skin moisturizer/barrier cream  - Collaborate with interdisciplinary team   - Patient/family teaching  - Consider wound care consult   Outcome: Progressing

## 2024-07-19 ENCOUNTER — TRANSITIONAL CARE MANAGEMENT (OUTPATIENT)
Dept: INTERNAL MEDICINE CLINIC | Facility: CLINIC | Age: 79
End: 2024-07-19

## 2024-07-19 ENCOUNTER — HOSPITAL ENCOUNTER (INPATIENT)
Facility: HOSPITAL | Age: 79
LOS: 21 days | Discharge: NURSING, REHAB OR LONG-TERM CARE | DRG: 278 | End: 2024-08-09
Attending: INTERNAL MEDICINE | Admitting: INTERNAL MEDICINE
Payer: COMMERCIAL

## 2024-07-19 VITALS
BODY MASS INDEX: 31.1 KG/M2 | RESPIRATION RATE: 16 BRPM | OXYGEN SATURATION: 97 % | SYSTOLIC BLOOD PRESSURE: 154 MMHG | DIASTOLIC BLOOD PRESSURE: 65 MMHG | WEIGHT: 148.15 LBS | TEMPERATURE: 97.6 F | HEART RATE: 72 BPM | HEIGHT: 58 IN

## 2024-07-19 DIAGNOSIS — K92.1 MELENA: ICD-10-CM

## 2024-07-19 DIAGNOSIS — I65.22 SYMPTOMATIC CAROTID ARTERY STENOSIS, LEFT: ICD-10-CM

## 2024-07-19 DIAGNOSIS — Z86.73 HX-TIA (TRANSIENT ISCHEMIC ATTACK): ICD-10-CM

## 2024-07-19 DIAGNOSIS — I25.10 STENOSIS OF LEFT ANTERIOR DESCENDING ARTERY: ICD-10-CM

## 2024-07-19 DIAGNOSIS — K72.00 ACUTE LIVER FAILURE WITHOUT HEPATIC COMA: ICD-10-CM

## 2024-07-19 DIAGNOSIS — I70.201 FEMORAL ARTERY STENOSIS, RIGHT (HCC): ICD-10-CM

## 2024-07-19 DIAGNOSIS — I73.9 CLAUDICATION IN PERIPHERAL VASCULAR DISEASE (HCC): ICD-10-CM

## 2024-07-19 DIAGNOSIS — I24.0 RIGHT CORONARY ARTERY OCCLUSION (HCC): ICD-10-CM

## 2024-07-19 DIAGNOSIS — I74.09 AORTOILIAC OCCLUSIVE DISEASE (HCC): ICD-10-CM

## 2024-07-19 DIAGNOSIS — I96 GANGRENE OF TOE OF RIGHT FOOT (HCC): Primary | ICD-10-CM

## 2024-07-19 DIAGNOSIS — R94.39 POSITIVE CARDIAC STRESS TEST: ICD-10-CM

## 2024-07-19 DIAGNOSIS — L97.511 DIABETIC ULCER OF TOE OF RIGHT FOOT ASSOCIATED WITH DIABETES MELLITUS DUE TO UNDERLYING CONDITION, LIMITED TO BREAKDOWN OF SKIN (HCC): ICD-10-CM

## 2024-07-19 DIAGNOSIS — E08.621 DIABETIC ULCER OF TOE OF RIGHT FOOT ASSOCIATED WITH DIABETES MELLITUS DUE TO UNDERLYING CONDITION, LIMITED TO BREAKDOWN OF SKIN (HCC): ICD-10-CM

## 2024-07-19 DIAGNOSIS — E11.51 TYPE 2 DIABETES MELLITUS WITH DIABETIC PERIPHERAL ANGIOPATHY WITHOUT GANGRENE, WITHOUT LONG-TERM CURRENT USE OF INSULIN (HCC): ICD-10-CM

## 2024-07-19 DIAGNOSIS — N18.4 STAGE 4 CHRONIC KIDNEY DISEASE (HCC): ICD-10-CM

## 2024-07-19 DIAGNOSIS — I70.235 ATHEROSCLEROSIS OF NATIVE ARTERIES OF RIGHT LEG WITH ULCERATION OF OTHER PART OF FOOT (HCC): ICD-10-CM

## 2024-07-19 DIAGNOSIS — R41.0 CONFUSION: ICD-10-CM

## 2024-07-19 DIAGNOSIS — I25.118 CORONARY ARTERY DISEASE OF NATIVE ARTERY OF NATIVE HEART WITH STABLE ANGINA PECTORIS (HCC): ICD-10-CM

## 2024-07-19 PROBLEM — L03.031 CELLULITIS OF TOE OF RIGHT FOOT: Status: ACTIVE | Noted: 2024-07-19

## 2024-07-19 LAB
ANION GAP SERPL CALCULATED.3IONS-SCNC: 12 MMOL/L (ref 4–13)
ATRIAL RATE: 77 BPM
BACTERIA UR CULT: ABNORMAL
BASOPHILS # BLD AUTO: 0.06 THOUSANDS/ÂΜL (ref 0–0.1)
BASOPHILS NFR BLD AUTO: 0 % (ref 0–1)
BUN SERPL-MCNC: 20 MG/DL (ref 5–25)
CALCIUM SERPL-MCNC: 8.1 MG/DL (ref 8.4–10.2)
CHLORIDE SERPL-SCNC: 108 MMOL/L (ref 96–108)
CO2 SERPL-SCNC: 20 MMOL/L (ref 21–32)
CREAT SERPL-MCNC: 1.53 MG/DL (ref 0.6–1.3)
EOSINOPHIL # BLD AUTO: 0.26 THOUSAND/ÂΜL (ref 0–0.61)
EOSINOPHIL NFR BLD AUTO: 2 % (ref 0–6)
ERYTHROCYTE [DISTWIDTH] IN BLOOD BY AUTOMATED COUNT: 13.7 % (ref 11.6–15.1)
GFR SERPL CREATININE-BSD FRML MDRD: 32 ML/MIN/1.73SQ M
GLUCOSE SERPL-MCNC: 100 MG/DL (ref 65–140)
GLUCOSE SERPL-MCNC: 102 MG/DL (ref 65–140)
GLUCOSE SERPL-MCNC: 109 MG/DL (ref 65–140)
GLUCOSE SERPL-MCNC: 121 MG/DL (ref 65–140)
GLUCOSE SERPL-MCNC: 127 MG/DL (ref 65–140)
HCT VFR BLD AUTO: 33.5 % (ref 34.8–46.1)
HGB BLD-MCNC: 10.8 G/DL (ref 11.5–15.4)
IMM GRANULOCYTES # BLD AUTO: 0.09 THOUSAND/UL (ref 0–0.2)
IMM GRANULOCYTES NFR BLD AUTO: 1 % (ref 0–2)
LYMPHOCYTES # BLD AUTO: 0.75 THOUSANDS/ÂΜL (ref 0.6–4.47)
LYMPHOCYTES NFR BLD AUTO: 6 % (ref 14–44)
MCH RBC QN AUTO: 28.4 PG (ref 26.8–34.3)
MCHC RBC AUTO-ENTMCNC: 32.2 G/DL (ref 31.4–37.4)
MCV RBC AUTO: 88 FL (ref 82–98)
MONOCYTES # BLD AUTO: 1.08 THOUSAND/ÂΜL (ref 0.17–1.22)
MONOCYTES NFR BLD AUTO: 8 % (ref 4–12)
NEUTROPHILS # BLD AUTO: 11.11 THOUSANDS/ÂΜL (ref 1.85–7.62)
NEUTS SEG NFR BLD AUTO: 83 % (ref 43–75)
NRBC BLD AUTO-RTO: 0 /100 WBCS
PLATELET # BLD AUTO: 357 THOUSANDS/UL (ref 149–390)
PMV BLD AUTO: 9.9 FL (ref 8.9–12.7)
POTASSIUM SERPL-SCNC: 3.8 MMOL/L (ref 3.5–5.3)
QRS AXIS: 114 DEGREES
QRSD INTERVAL: 154 MS
QT INTERVAL: 496 MS
QTC INTERVAL: 561 MS
RBC # BLD AUTO: 3.8 MILLION/UL (ref 3.81–5.12)
SODIUM SERPL-SCNC: 140 MMOL/L (ref 135–147)
T WAVE AXIS: 80 DEGREES
VENTRICULAR RATE: 77 BPM
WBC # BLD AUTO: 13.35 THOUSAND/UL (ref 4.31–10.16)

## 2024-07-19 PROCEDURE — 99232 SBSQ HOSP IP/OBS MODERATE 35: CPT | Performed by: INTERNAL MEDICINE

## 2024-07-19 PROCEDURE — 82948 REAGENT STRIP/BLOOD GLUCOSE: CPT

## 2024-07-19 PROCEDURE — 93922 UPR/L XTREMITY ART 2 LEVELS: CPT | Performed by: SURGERY

## 2024-07-19 PROCEDURE — 99232 SBSQ HOSP IP/OBS MODERATE 35: CPT | Performed by: SURGERY

## 2024-07-19 PROCEDURE — 99239 HOSP IP/OBS DSCHRG MGMT >30: CPT | Performed by: FAMILY MEDICINE

## 2024-07-19 PROCEDURE — 80048 BASIC METABOLIC PNL TOTAL CA: CPT | Performed by: FAMILY MEDICINE

## 2024-07-19 PROCEDURE — 99232 SBSQ HOSP IP/OBS MODERATE 35: CPT | Performed by: FAMILY MEDICINE

## 2024-07-19 PROCEDURE — 99222 1ST HOSP IP/OBS MODERATE 55: CPT | Performed by: INTERNAL MEDICINE

## 2024-07-19 PROCEDURE — 93010 ELECTROCARDIOGRAM REPORT: CPT | Performed by: INTERNAL MEDICINE

## 2024-07-19 PROCEDURE — 93925 LOWER EXTREMITY STUDY: CPT | Performed by: SURGERY

## 2024-07-19 PROCEDURE — 85025 COMPLETE CBC W/AUTO DIFF WBC: CPT | Performed by: FAMILY MEDICINE

## 2024-07-19 RX ORDER — CHLORHEXIDINE GLUCONATE ORAL RINSE 1.2 MG/ML
15 SOLUTION DENTAL ONCE
Status: CANCELLED | OUTPATIENT
Start: 2024-07-19 | End: 2024-07-19

## 2024-07-19 RX ORDER — LIDOCAINE 50 MG/G
1 PATCH TOPICAL DAILY
Status: CANCELLED | OUTPATIENT
Start: 2024-07-20

## 2024-07-19 RX ORDER — ASPIRIN 81 MG/1
81 TABLET, CHEWABLE ORAL DAILY
Status: DISCONTINUED | OUTPATIENT
Start: 2024-07-20 | End: 2024-08-05

## 2024-07-19 RX ORDER — LOSARTAN POTASSIUM 50 MG/1
100 TABLET ORAL DAILY
Status: CANCELLED | OUTPATIENT
Start: 2024-07-20

## 2024-07-19 RX ORDER — ESCITALOPRAM OXALATE 20 MG/1
20 TABLET ORAL DAILY
Status: DISCONTINUED | OUTPATIENT
Start: 2024-07-20 | End: 2024-08-09 | Stop reason: HOSPADM

## 2024-07-19 RX ORDER — AMLODIPINE BESYLATE 5 MG/1
5 TABLET ORAL DAILY
Status: CANCELLED | OUTPATIENT
Start: 2024-07-20

## 2024-07-19 RX ORDER — CINACALCET 30 MG/1
30 TABLET, FILM COATED ORAL EVERY OTHER DAY
Status: DISCONTINUED | OUTPATIENT
Start: 2024-07-20 | End: 2024-07-20

## 2024-07-19 RX ORDER — ASPIRIN 81 MG/1
81 TABLET, CHEWABLE ORAL DAILY
Status: CANCELLED | OUTPATIENT
Start: 2024-07-20

## 2024-07-19 RX ORDER — ALLOPURINOL 100 MG/1
100 TABLET ORAL DAILY
Status: CANCELLED | OUTPATIENT
Start: 2024-07-20

## 2024-07-19 RX ORDER — CLONIDINE 0.3 MG/24H
1 PATCH, EXTENDED RELEASE TRANSDERMAL WEEKLY
Status: CANCELLED | OUTPATIENT
Start: 2024-07-23

## 2024-07-19 RX ORDER — LOSARTAN POTASSIUM 50 MG/1
100 TABLET ORAL DAILY
Status: DISCONTINUED | OUTPATIENT
Start: 2024-07-20 | End: 2024-07-20

## 2024-07-19 RX ORDER — OXYCODONE HYDROCHLORIDE 5 MG/1
5 TABLET ORAL EVERY 6 HOURS PRN
Status: DISCONTINUED | OUTPATIENT
Start: 2024-07-19 | End: 2024-08-09 | Stop reason: HOSPADM

## 2024-07-19 RX ORDER — ONDANSETRON 2 MG/ML
4 INJECTION INTRAMUSCULAR; INTRAVENOUS EVERY 6 HOURS PRN
Status: CANCELLED | OUTPATIENT
Start: 2024-07-19

## 2024-07-19 RX ORDER — INSULIN LISPRO 100 [IU]/ML
1-5 INJECTION, SOLUTION INTRAVENOUS; SUBCUTANEOUS
Status: CANCELLED | OUTPATIENT
Start: 2024-07-19

## 2024-07-19 RX ORDER — LIDOCAINE 50 MG/G
1 PATCH TOPICAL DAILY
Status: DISCONTINUED | OUTPATIENT
Start: 2024-07-20 | End: 2024-08-09 | Stop reason: HOSPADM

## 2024-07-19 RX ORDER — OXYCODONE HYDROCHLORIDE 5 MG/1
5 TABLET ORAL EVERY 6 HOURS PRN
Status: CANCELLED | OUTPATIENT
Start: 2024-07-19

## 2024-07-19 RX ORDER — CEFAZOLIN SODIUM 1 G/50ML
1000 SOLUTION INTRAVENOUS EVERY 12 HOURS
Status: DISCONTINUED | OUTPATIENT
Start: 2024-07-19 | End: 2024-07-25

## 2024-07-19 RX ORDER — ACETAMINOPHEN 325 MG/1
975 TABLET ORAL EVERY 8 HOURS SCHEDULED
Status: CANCELLED | OUTPATIENT
Start: 2024-07-19

## 2024-07-19 RX ORDER — CINACALCET 30 MG/1
30 TABLET, FILM COATED ORAL EVERY OTHER DAY
Status: CANCELLED | OUTPATIENT
Start: 2024-07-20

## 2024-07-19 RX ORDER — ACETAMINOPHEN 325 MG/1
975 TABLET ORAL EVERY 8 HOURS SCHEDULED
Status: DISCONTINUED | OUTPATIENT
Start: 2024-07-19 | End: 2024-07-29

## 2024-07-19 RX ORDER — ESCITALOPRAM OXALATE 10 MG/1
20 TABLET ORAL DAILY
Status: CANCELLED | OUTPATIENT
Start: 2024-07-20

## 2024-07-19 RX ORDER — HEPARIN SODIUM 5000 [USP'U]/ML
5000 INJECTION, SOLUTION INTRAVENOUS; SUBCUTANEOUS EVERY 8 HOURS SCHEDULED
Status: DISCONTINUED | OUTPATIENT
Start: 2024-07-19 | End: 2024-07-24

## 2024-07-19 RX ORDER — TRAZODONE HYDROCHLORIDE 50 MG/1
50 TABLET ORAL
Status: DISCONTINUED | OUTPATIENT
Start: 2024-07-19 | End: 2024-07-30

## 2024-07-19 RX ORDER — LABETALOL 100 MG/1
300 TABLET, FILM COATED ORAL EVERY 12 HOURS SCHEDULED
Status: CANCELLED | OUTPATIENT
Start: 2024-07-19

## 2024-07-19 RX ORDER — ALLOPURINOL 100 MG/1
100 TABLET ORAL DAILY
Status: DISCONTINUED | OUTPATIENT
Start: 2024-07-20 | End: 2024-08-09 | Stop reason: HOSPADM

## 2024-07-19 RX ORDER — HYDROMORPHONE HCL IN WATER/PF 6 MG/30 ML
0.2 PATIENT CONTROLLED ANALGESIA SYRINGE INTRAVENOUS EVERY 6 HOURS PRN
Status: CANCELLED | OUTPATIENT
Start: 2024-07-19

## 2024-07-19 RX ORDER — TRAZODONE HYDROCHLORIDE 50 MG/1
50 TABLET ORAL
Status: CANCELLED | OUTPATIENT
Start: 2024-07-19

## 2024-07-19 RX ORDER — AMLODIPINE BESYLATE 5 MG/1
5 TABLET ORAL DAILY
Status: DISCONTINUED | OUTPATIENT
Start: 2024-07-20 | End: 2024-07-20

## 2024-07-19 RX ORDER — HYDROMORPHONE HCL IN WATER/PF 6 MG/30 ML
0.2 PATIENT CONTROLLED ANALGESIA SYRINGE INTRAVENOUS EVERY 6 HOURS PRN
Status: DISCONTINUED | OUTPATIENT
Start: 2024-07-19 | End: 2024-08-09 | Stop reason: HOSPADM

## 2024-07-19 RX ORDER — PRAVASTATIN SODIUM 80 MG/1
80 TABLET ORAL
Status: DISCONTINUED | OUTPATIENT
Start: 2024-07-19 | End: 2024-07-30

## 2024-07-19 RX ORDER — ONDANSETRON 2 MG/ML
4 INJECTION INTRAMUSCULAR; INTRAVENOUS EVERY 6 HOURS PRN
Status: DISCONTINUED | OUTPATIENT
Start: 2024-07-19 | End: 2024-08-09 | Stop reason: HOSPADM

## 2024-07-19 RX ORDER — INSULIN LISPRO 100 [IU]/ML
1-5 INJECTION, SOLUTION INTRAVENOUS; SUBCUTANEOUS
Status: DISCONTINUED | OUTPATIENT
Start: 2024-07-19 | End: 2024-08-09 | Stop reason: HOSPADM

## 2024-07-19 RX ORDER — PRAVASTATIN SODIUM 80 MG/1
80 TABLET ORAL
Status: CANCELLED | OUTPATIENT
Start: 2024-07-19

## 2024-07-19 RX ORDER — CLONIDINE 0.3 MG/24H
1 PATCH, EXTENDED RELEASE TRANSDERMAL WEEKLY
Status: DISCONTINUED | OUTPATIENT
Start: 2024-07-23 | End: 2024-07-29

## 2024-07-19 RX ORDER — HEPARIN SODIUM 5000 [USP'U]/ML
5000 INJECTION, SOLUTION INTRAVENOUS; SUBCUTANEOUS EVERY 8 HOURS SCHEDULED
Status: CANCELLED | OUTPATIENT
Start: 2024-07-19

## 2024-07-19 RX ORDER — CEFAZOLIN SODIUM 1 G/50ML
1000 SOLUTION INTRAVENOUS EVERY 12 HOURS
Status: CANCELLED | OUTPATIENT
Start: 2024-07-19

## 2024-07-19 RX ADMIN — AMLODIPINE BESYLATE 5 MG: 5 TABLET ORAL at 10:22

## 2024-07-19 RX ADMIN — LOSARTAN POTASSIUM 100 MG: 50 TABLET, FILM COATED ORAL at 10:23

## 2024-07-19 RX ADMIN — TRAZODONE HYDROCHLORIDE 50 MG: 50 TABLET ORAL at 22:13

## 2024-07-19 RX ADMIN — ESCITALOPRAM OXALATE 20 MG: 10 TABLET ORAL at 10:22

## 2024-07-19 RX ADMIN — HEPARIN SODIUM 5000 UNITS: 5000 INJECTION INTRAVENOUS; SUBCUTANEOUS at 14:59

## 2024-07-19 RX ADMIN — PRAVASTATIN SODIUM 80 MG: 80 TABLET ORAL at 18:09

## 2024-07-19 RX ADMIN — ACETAMINOPHEN 975 MG: 325 TABLET, FILM COATED ORAL at 14:58

## 2024-07-19 RX ADMIN — LABETALOL HYDROCHLORIDE 300 MG: 200 TABLET, FILM COATED ORAL at 22:13

## 2024-07-19 RX ADMIN — CEFAZOLIN SODIUM 1000 MG: 1 SOLUTION INTRAVENOUS at 10:23

## 2024-07-19 RX ADMIN — HEPARIN SODIUM 5000 UNITS: 5000 INJECTION INTRAVENOUS; SUBCUTANEOUS at 05:41

## 2024-07-19 RX ADMIN — OXYCODONE HYDROCHLORIDE 5 MG: 5 TABLET ORAL at 22:13

## 2024-07-19 RX ADMIN — TICAGRELOR 90 MG: 90 TABLET ORAL at 22:25

## 2024-07-19 RX ADMIN — LABETALOL HYDROCHLORIDE 300 MG: 100 TABLET, FILM COATED ORAL at 10:22

## 2024-07-19 RX ADMIN — TICAGRELOR 90 MG: 90 TABLET ORAL at 10:23

## 2024-07-19 RX ADMIN — ACETAMINOPHEN 975 MG: 325 TABLET, FILM COATED ORAL at 05:41

## 2024-07-19 RX ADMIN — ASPIRIN 81 MG: 81 TABLET, CHEWABLE ORAL at 10:22

## 2024-07-19 RX ADMIN — ALLOPURINOL 100 MG: 100 TABLET ORAL at 10:22

## 2024-07-19 RX ADMIN — ACETAMINOPHEN 975 MG: 325 TABLET, FILM COATED ORAL at 22:13

## 2024-07-19 RX ADMIN — HEPARIN SODIUM 5000 UNITS: 5000 INJECTION INTRAVENOUS; SUBCUTANEOUS at 22:13

## 2024-07-19 RX ADMIN — CEFAZOLIN SODIUM 1000 MG: 1 SOLUTION INTRAVENOUS at 22:50

## 2024-07-19 NOTE — ASSESSMENT & PLAN NOTE
Lab Results   Component Value Date    HGBA1C 5.9 (H) 07/09/2024       Recent Labs     07/18/24  1611 07/18/24  2125 07/19/24  0547 07/19/24  1057   POCGLU 128 100 102 121       Blood Sugar Average: Last 72 hrs:    Hemoglobin A1c indicates good control   Hold home Tr[place] while inpatient  Continue diabetic diet, sliding scale coverage with ACHS checks  Hypoglycemia protocol.

## 2024-07-19 NOTE — DISCHARGE SUMMARY
Critical access hospital  Discharge- Anamaria Parnell 1945, 79 y.o. female MRN: [id number]  Unit/Bed#: -01 Encounter: [id number]  Primary Care Provider: Ritchie Lawton MD   Date and time admitted to hospital: 7/16/2024  5:12 PM    * Sepsis (HCC)  Assessment & Plan  Sepsis, likely present on admission, due to leukocytosis, hypothermia with suspected soft tissue infection of toe   On IV Cefazolin  S/p 1L IVF Bolus, now on maintenance fluids  blood c/s neg so far  Monitor wbc, any e/o fever        Gangrene (HCC)  Assessment & Plan  79 yoF former smoker and vasculopath with DM II,CAD,PAD with chronic right foot wounds now with right great toe gangrene.  Right great toe pain ongoing for months and has been worsening with increased toe pain & black discoloration and foul smell.    Started iv ancef for soft tissue infection  Pain management : tylenol, prn oxy, dilaudid (cautious with use due to her CKD   Podiatry on board.  Vascular surgery on board  She needs extensive vascular intervention to her right lower extremity therefore vascular surgery recommending transfer to [place] under [facility] for right common femoral endarterectomy with retrograde iliac stenting and femoral to tibial bypass  Being transferred to [facility]. [facility] accepted. Patient in agreement        Acute renal failure superimposed on stage 3 chronic kidney disease (HCC)  Assessment & Plan  Lab Results   Component Value Date    EGFR 32 07/19/2024    EGFR 32 07/18/2024    EGFR 26 07/17/2024    CREATININE 1.53 (H) 07/19/2024    CREATININE 1.53 (H) 07/18/2024    CREATININE 1.79 (H) 07/17/2024   Admitted with acute on chronic kidney disease.  (Baseline  1.2-1.8)  Admitted with creatinine 2.19.   On gentle hydration. Improving, creatinine 1.53 this am  Nephrology on board, appreciate input    Aortoiliac occlusive disease (HCC)  Assessment & Plan  PAD with chronic right foot wounds now with right great toe gangrene.    Vascular surgery on  board  On asa / brillinta / statin for now    Type 2 diabetes mellitus with diabetic nephropathy (HCC)  Assessment & Plan  Lab Results   Component Value Date    HGBA1C 5.9 (H) 07/09/2024       Recent Labs     07/18/24  1611 07/18/24  2125 07/19/24  0547 07/19/24  1057   POCGLU 128 100 102 121         Blood Sugar Average: Last 72 hrs:  (P) 112.3  Hold home Tradjenta during hospitalization  Patient placed on sliding scale coverage with ACHS checks  A1c notes good control of blood sugar [5.9]  Hypoglycemia protocol.    Basilar artery stenosis  Assessment & Plan  Patient with a history of carotid surgery.    Follows with Dr. Gallo    Discharging Physician / Practitioner: Grzegorz Voss MD  PCP: Ritchie Lawton MD  Admission Date:   Admission Orders (From admission, onward)       Ordered        07/16/24 1938  INPATIENT ADMISSION  Once                          Discharge Date: 07/19/24    Disposition:      [facility]    Reason for Admission: right toe gangrene    Discharge Diagnoses:     Please see assessment and plan section above for further details regarding discharge diagnoses.     Medical Problems       Resolved Problems  Date Reviewed: 7/16/2024   None         Consultations During Hospital Stay:  Vascular surgery  Podiatry  nephrology    Medication Adjustments and Discharge Medications:  Summary of Medication Adjustments made as a result of this hospitalization: see med rec  Medication Dosing Tapers - Please refer to Discharge Medication List for details on any medication dosing tapers (if applicable to patient).  Medications being temporarily held (include recommended restart time): see med rec  Discharge Medication List: See after visit summary for reconciled discharge medications.     Diet Recommendations at Discharge:  Diet -        Diet Orders   (From admission, onward)                 Start     Ordered    07/16/24 2059  Diet Cardiovascular; Cardiac  Diet effective now        References:    Adult Nutrition  "Support Algorithm    RD Therapeutic Diet Order Protocol   Question Answer Comment   Diet Type Cardiovascular    Cardiac Cardiac    RD to adjust diet per protocol? Yes        07/16/24 2058                      Hospital Course:     Anamaria Parnell is a 79 y.o. female patient who originally presented to the hospital on 7/16/2024 former smoker and vasculopath with DM II,CAD,PAD with chronic right foot wounds now with right great toe gangrene.  Right great toe pain ongoing for months and has been worsening with increased toe pain & black discoloration and foul smell.    Started iv ancef for soft tissue infection  Pain management : tylenol, prn oxy, dilaudid (cautious with use due to her CKD   On asa , brilinta, statin  Podiatry on board.  Vascular surgery on board  She needs extensive vascular intervention to her right lower extremity therefore vascular surgery recommending transfer to [place] under [facility] for right common femoral endarterectomy with retrograde iliac stenting and femoral to tibial bypass    Being transferred to [place]. [facility] accepted. Patient in agreement with plan.     Condition at Discharge: fair     Discharge Day Visit / Exam:       Vitals: Blood Pressure: 154/65 (07/19/24 0714)  Pulse: 72 (07/19/24 0714)  Temperature: 97.6 °F (36.4 °C) (07/19/24 0714)  Temp Source: Oral (07/17/24 2315)  Respirations: 16 (07/19/24 0714)  Height: 4' 10\" (147.3 cm) (07/16/24 1706)  Weight - Scale: 67.2 kg (148 lb 2.4 oz) (07/19/24 0708)  SpO2: 97 % (07/19/24 0714)  Exam:   Physical Exam  Constitutional:       Appearance: She is obese. She is not toxic-appearing.   HENT:      Mouth/Throat:      Mouth: Mucous membranes are moist.      Pharynx: Oropharynx is clear.   Cardiovascular:      Rate and Rhythm: Normal rate and regular rhythm.      Pulses: Normal pulses.   Pulmonary:      Effort: Pulmonary effort is normal. No respiratory distress.      Breath sounds: Normal breath sounds.   Abdominal:      General: Abdomen is " flat. Bowel sounds are normal.      Palpations: Abdomen is soft.   Neurological:      Mental Status: She is alert and oriented to person, place, and time.           Goals of Care Discussions:  Code Status at Discharge: Level 3 - DNAR and DNI      Discharge instructions/Information to patient and family:   See after visit summary section titled Discharge Instructions for information provided to patient and family.       Discharge Statement:  I spent 40 minutes discharging the patient. This time was spent on the day of discharge. I had direct contact with the patient on the day of discharge. Greater than 50% of the total time was spent examining patient, answering all patient questions, arranging and discussing plan of care with patient as well as directly providing post-discharge instructions.  Additional time then spent on discharge activities.    ** Please Note: This note has been constructed using a voice recognition system **

## 2024-07-19 NOTE — ASSESSMENT & PLAN NOTE
Lab Results   Component Value Date    HGBA1C 5.9 (H) 07/09/2024       Recent Labs     07/18/24  1041 07/18/24  1611 07/18/24  2125 07/19/24  0547   POCGLU 119 128 100 102         Blood Sugar Average: Last 72 hrs:  (P) 111.3820789181139140  Hold home Tradjenta during hospitalization  Patient placed on sliding scale coverage with ACHS checks  A1c notes good control of blood sugar [5.9]  Hypoglycemia protocol.

## 2024-07-19 NOTE — ASSESSMENT & PLAN NOTE
Patient is a 78 yo female, former smoker,  with PMH obesity, CKD IV, DM II, HLD, HTN, CAD, renal artery stenosis, mesenteric artery stenosis, AIOD/PAD with chronic right foot wounds, L MCA and PCA CVA s/p L TCAR 9/7/23 ([facility]). Patient admitted to [facility] on 7/16/2024 with worsening right great toe pain and chronic right great toe gangrene. Patient follows with vascular surgery for PAD and chronic limb ischemia. Vascular surgery consulted to evaluate peripheral perfusion and possible revascularization options.    Imaging:  CTA abdomen with runoff: pending  TEA 7/16/24:    R 0.11/--/--; Severe fem-pop disease c HG stenosis/occlusion prox-distal SFA c recon popliteal. Tib/peroneal occlusive disease. Distal YOVANY occlusion.    L 0.31/20/19; Severe diffuse fem-pop disease with HG v occlusion prox-mid SFA with recon distal SFA. Tib/Peronea occlusive disease. Entire PT artery high grade stenosis v occlusion.   Lower extremity vein mapping 7/18/2024:  RIGHT LOWER LIMB:  The great saphenous vein is patent  from the groin to the ankle.  The intraluminal diameter measurements range from 2.2mm to 6.4mm throughout.  LEFT LOWER LIMB:  The great saphenous vein is patent  from the groin to the ankle.  The intraluminal diameter measurements range from 1.3mm to 6.8mm throughout.    Plan:   -CLTI with advanced calcific atherosclerotic occlusive disease and R hallux dry gangrene and pain. Afebrile, non-toxic   -Recurrent R foot cellulitis which appears improving on antibiotics with cephalexin.  -Patient will require extensive revascularization procedure given severe multilevel occlusive disease.  -Planning for right common femoral endarterectomy with retrograde right EIA stenting, as well as right femoral to tibial bypass.  -Discussed with patient and she is in agreement. She will require transfer to [facility] for procedure tentatively scheduled 7/24/2024  -Cardiology consulted for preoperative risk stratification  -Trend WBC and follow-up blood  cultures  -Continue medical optimization with aspirin and ticagrelor  -Pain and medical management as per [facility]/admitting team  -Local wound care as per podiatry  -Discussed with NICOLLE  -D/w

## 2024-07-19 NOTE — H&P
[facility]  H&P  Name: Anamaria Parnell 79 y.o. female I MRN: [id number]  Unit/Bed#: E5 -01 I Date of Admission: 7/19/2024   Date of Service: 7/19/2024 I Hospital Day: 0      Assessment & Plan   * Gangrene (HCC)  Assessment & Plan  79 yoF former smoker and vasculopath with DM II,CAD,PAD with chronic right foot wounds x months and eventually developed right great toe gangrene.     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  Patient will require extensive revascularization.  Tentative procedure on 7/24/2024  Cardiology consulted for preop clearance  Podiatry on board.    Cellulitis of toe of right foot  Assessment & Plan  Patient presenting with right big toe gangrene and associated cellulitis  Patient remains afebrile, nontoxic  Continue IV Ancef for now    Aortoiliac occlusive disease (HCC)  Assessment & Plan  PAD with occlusion with multiple vessels  Vascular surgery on board -plan for extensive revascularization  Continue aspirin/ brillinta / statin for now    Type 2 diabetes mellitus, without long-term current use of insulin (HCC)  Assessment & Plan  Lab Results   Component Value Date    HGBA1C 5.9 (H) 07/09/2024       Recent Labs     07/18/24  1611 07/18/24  2125 07/19/24  0547 07/19/24  1057   POCGLU 128 100 102 121       Blood Sugar Average: Last 72 hrs:    Hemoglobin A1c indicates good control   Hold home Tradjenta while inpatient  Continue diabetic diet, [facility]  Hypoglycemia protocol.      Stage 4 chronic kidney disease (HCC)  Assessment & Plan  Lab Results   Component Value Date    EGFR 32 07/19/2024    EGFR 32 07/18/2024    EGFR 26 07/17/2024    CREATININE 1.53 (H) 07/19/2024    CREATININE 1.53 (H) 07/18/2024    CREATININE 1.79 (H) 07/17/2024     Baseline creatinine 1.2-1.8, currently around baseline  Initially had RICARDO on CKD at the time of admission at  [facility]  Avoid nephrotoxic agent, hypotension    CVA (cerebral vascular accident) (HCC)  Assessment & Plan  History of CVA  Continue aspirin, statin    Basilar artery stenosis  Assessment & Plan  history of carotid surgery.    Follows with Dr. Gallo       VTE Prophylaxis: Heparin  / sequential compression device   Code Status: Full code  POLST: There is no POLST form on file for this patient (pre-hospital)  Discussion with family: Discussed with patient    Anticipated Length of Stay:  Patient will be admitted on an Inpatient basis with an anticipated length of stay of more than 2 midnights.   Justification for Hospital Stay: Right big toe gangrene, PAD requiring revascularization    Total Time for Visit, including Counseling / Coordination of Care: 45 minutes.  Greater than 50% of this total time spent on direct patient counseling and coordination of care.    Chief Complaint:   Right big toe gangrene    History of Present Illness:    Anamaria Parnell is a 79 y.o. female with PMH of PAD complicated by right big toe gangrene, former smoker, chronic right foot wounds, type II DM, CAD who was transferred from  [facility] to [facility] for revascularization.  Patient was admitted for right big toe gangrene and cellulitis in [facility].  Patient has right big toe pain for months which has been progressively worse.  She also noticed black discoloration and foul smelling.  Patient was started on IV Ancef for associated cellulitis.  Patient was evaluated by vascular surgery who is recommended extensive vascular intervention to her right lower extremity.  Patient remains hemodynamically stable.    Review of Systems:    Review of Systems Patient was seen and examined at bedside. The patient has right big toe gangrene and pain.  She denies any fever, chills, chest pain, palpitation, shortness of breath, N/V, abd pain.      Past Medical and Surgical History:     Past Medical History:   Diagnosis Date    Aphasia  as late effect of cerebrovascular accident (CVA) 08/16/2023    Arthritis     Chronic kidney disease     Diabetes mellitus (HCC)     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/20/2021    Endometriosis     High cholesterol     History of left common carotid artery stent placement 10/27/2023    Hypertension     Kidney disease, chronic, stage III (moderate, EGFR 30-59 ml/min) ([facility])     Lumbar disc herniation     Median arcuate ligament syndrome ([facility]) 11/05/2019    Neuropathy     Obesity (BMI 30-39.9) 12/04/2017    Obesity, morbid ([facility]) 01/20/2021    Peripheral vascular disease ([facility])     Pneumonia     Shoulder injury     left    Spinal stenosis     Stroke ([facility]) 2015    Memory loss       Past Surgical History:   Procedure Laterality Date    CARDIAC CATHETERIZATION      CAROTID STENT Left 9/7/2023    Procedure: L TCAR;  Surgeon: Nicole Gallo MD;  Location: [facility];  Service: Vascular    COLONOSCOPY      FL RETROGRADE PYELOGRAM  4/6/2020    FRACTURE SURGERY Right     ankle    IR CAROTID STENT  9/7/2023    OVARIAN CYST SURGERY      VT CYSTO BLADDER W/URETERAL CATHETERIZATION Bilateral 4/6/2020    Procedure: CYSTOSCOPY WITH RETROGRADE PYELOGRAM;  Surgeon: Robert Mitchell MD;  Location: [facility];  Service: Urology    VT CYSTO W/INSERT URETERAL STENT Right 4/6/2020    Procedure: INSERTION STENT URETERAL;  Surgeon: Robret Mitchell MD;  [facility];  Service: Urology    VT CYSTO W/REMOVAL OF TUMORS SMALL N/A 4/6/2020    Procedure: TRANSURETHRAL RESECTION OF BLADDER TUMOR (TURBT);  Surgeon: Robert Mitchell MD;  [facility];  Service: Urology    ROTATOR CUFF REPAIR Left     TONSILLECTOMY         Meds/Allergies:    Prior to Admission medications    Medication Sig Start Date End Date Taking? Authorizing Provider   acetaminophen (TYLENOL) 500 mg tablet Take 650 mg by mouth every 6 (six) hours as needed for mild pain    Historical Provider, MD   allopurinol (ZYLOPRIM) 100 mg  tablet TAKE 1 TABLET BY MOUTH EVERY DAY 7/8/24   Savage Longoria MD   amLODIPine (NORVASC) 5 mg tablet Take 1 tablet (5 mg total) by mouth daily Primary hypertension [I10] 7/1/24   Ritchie Lawton MD   aspirin 81 mg chewable tablet Chew 81 mg daily    Historical Provider, MD SANDY ULTRAFINE III SHORT PEN 31G X 8 MM MISC USE TWICE A DAY 1/15/24   Ritchie Lawton MD   Blood Glucose Monitoring Suppl (OneTouch Verio Reflect) w/Device KIT Check blood sugars three times daily. Please substitute with appropriate alternative as covered by patient's insurance. Dx: E11.65 7/15/24   Ritchie Lawton MD   cinacalcet (SENSIPAR) 30 mg tablet TAKE 1 TABLET (30 MG TOTAL) BY MOUTH EVERY OTHER DAY 6/10/24 3/7/25  Ritchie Lawton MD   cloNIDine (CATAPRES-TTS-3) 0.3 mg/24 hr Place 1 patch (0.3 mg total) on the skin over 7 days once a week 3/12/24   Ritchie Lawton MD   collagenase (SANTYL) ointment Apply topically daily  Patient not taking: Reported on 7/16/2024 4/20/24   Andi HARDY MD   Diclofenac Sodium (VOLTAREN) 1 % Apply 2 g topically 4 (four) times a day 7/8/24   Historical Provider, MD   escitalopram (LEXAPRO) 20 mg tablet Take 1 tablet (20 mg total) by mouth daily 7/10/24   Ritchie Lawton MD   Glucagon 1 MG/0.2ML SOAJ Inject 1 mg under the skin if needed (low blood sugar)    Historical Provider, MD   glucose blood (OneTouch Verio) test strip Check blood sugars three times daily. Please substitute with appropriate alternative as covered by patient's insurance. Dx: E11.65 7/15/24   Ritchie Lawton MD   labetalol (NORMODYNE) 300 mg tablet Take 1 tablet (300 mg total) by mouth every 12 (twelve) hours 2/7/24   Ritchie Lawton MD   levofloxacin (LEVAQUIN) 500 mg tablet Take 500 mg by mouth every 24 hours 14 day dose    Historical Provider, MD   lidocaine (LIDODERM) 5 % Apply 1 patch topically daily Remove & Discard patch within 12 hours or as directed by MD  Patient not taking: Reported on 7/16/2024     Historical Provider, MD   linaGLIPtin (Tradjenta) 5 MG TABS Take 5 mg by mouth daily 4/18/24   GRACIELA Pineda   losartan (COZAAR) 100 MG tablet Take 1 tablet (100 mg total) by mouth daily 7/16/24   Ritchie Lawton MD   OneTouch Delica Lancets 33G MISC Check blood sugars three times daily. Please substitute with appropriate alternative as covered by patient's insurance. Dx: E11.65 7/15/24   Ritchie Lawton MD   pravastatin (PRAVACHOL) 80 mg tablet TAKE 1 TABLET BY MOUTH DAILY WITH DINNER 4/1/24   Ritchie Lawton MD   ticagrelor (BRILINTA) 90 MG Take 1 tablet (90 mg total) by mouth every 12 (twelve) hours 4/8/24 10/5/24  Ritchie Lawton MD   traZODone (DESYREL) 50 mg tablet Take 1 tablet (50 mg total) by mouth daily at bedtime 7/1/24   Ritchie Lawton MD     I have reviewed home medications with patient personally.    Allergies:   Allergies   Allergen Reactions    Fenofibrate Other (See Comments)      blood in urine  hx  Kidney Failure    Colesevelam Other (See Comments)      leg pains    Colestipol Itching and Other (See Comments)      Swelling lower legs    Ezetimibe GI Intolerance    Statins Myalgia       Social History:     Marital Status: /Civil Union   Occupation: Retired  Patient Pre-hospital Living Situation: Home  Patient Pre-hospital Level of Mobility: Independent  Patient Pre-hospital Diet Restrictions: Diabetic diet  Substance Use History:   Social History     Substance and Sexual Activity   Alcohol Use Never     Social History     Tobacco Use   Smoking Status Former    Current packs/day: 0.00    Average packs/day: 2.0 packs/day for 54.6 years (109.1 ttl pk-yrs)    Types: Cigarettes    Start date: 1/1/1966    Quit date: 7/27/2020    Years since quitting: 3.9   Smokeless Tobacco Never     Social History     Substance and Sexual Activity   Drug Use Never       Family History:    Family History   Problem Relation Age of Onset    Hypertension Mother     Heart disease Mother          Valvular    Hyperlipidemia Mother     Hypertension Father     Heart defect Father         Cardiomegaly    Stroke Sister         Cerebrovascular Accident    Arthritis Brother     Other Brother         Back Disorder       Physical Exam:     Vitals:        Physical Exam    General: no acute distress  HEENT: NC/AT, PERRL, EOM - normal  Neck: Supple  Pulm/Chest: Normal chest wall expansion, clear breath sounds on both side  CVS: normal S1&S2, capillary refill <2s  Abd: soft, non tender, non distended, bowel sounds +  MSK: move all 4 extremities spontaneously, right big toe gangrene with associated cellulitis  Skin: warm  CNS: no acute focal neuro deficit      Additional Data:     Lab Results: I have personally reviewed pertinent reports.      Results from last 7 days   Lab Units 07/19/24  0518   WBC Thousand/uL 13.35*   HEMOGLOBIN g/dL 10.8*   HEMATOCRIT % 33.5*   PLATELETS Thousands/uL 357   SEGS PCT % 83*   LYMPHO PCT % 6*   MONO PCT % 8   EOS PCT % 2     Results from last 7 days   Lab Units 07/19/24  0518 07/17/24  0512 07/16/24  1738   SODIUM mmol/L 140   < > 140   POTASSIUM mmol/L 3.8   < > 4.4   CHLORIDE mmol/L 108   < > 104   CO2 mmol/L 20*   < > 21   BUN mg/dL 20   < > 35*   CREATININE mg/dL 1.53*   < > 2.19*   ANION GAP mmol/L 12   < > 15*   CALCIUM mg/dL 8.1*   < > 8.8   ALBUMIN g/dL  --   --  3.8   TOTAL BILIRUBIN mg/dL  --   --  0.38   ALK PHOS U/L  --   --  96   ALT U/L  --   --  4*   AST U/L  --   --  7*   GLUCOSE RANDOM mg/dL 100   < > 106    < > = values in this interval not displayed.     Results from last 7 days   Lab Units 07/16/24  1738   INR  1.17     Results from last 7 days   Lab Units 07/19/24  1057 07/19/24  0547 07/18/24  2125 07/18/24  1611 07/18/24  1041 07/18/24  0607 07/17/24  2148 07/17/24  1543 07/17/24  1052 07/17/24  0628   POC GLUCOSE mg/dl 121 102 100 128 119 104 105 89 120 135               Imaging: I have personally reviewed pertinent reports.      No orders to display        EKG, Pathology, and Other Studies Reviewed on Admission:   EKG: On 7/17/2024 showed NSR.  Intraventricular conduction delay.    Allscripts / Epic Records Reviewed: Yes     ** Please Note: This note has been constructed using a voice recognition system. **

## 2024-07-19 NOTE — NURSING NOTE
EMS transported patient to [facility]. This nurse called and gave report to Kadi. Patient sent with LAC IV intact. Kimo removed.

## 2024-07-19 NOTE — ASSESSMENT & PLAN NOTE
Lab Results   Component Value Date    HGBA1C 5.9 (H) 07/09/2024       Recent Labs     07/18/24  1611 07/18/24  2125 07/19/24  0547 07/19/24  1057   POCGLU 128 100 102 121         Blood Sugar Average: Last 72 hrs:  (P) 112.3  Hold home Tradjenta during hospitalization  Patient placed on sliding scale coverage with ACHS checks  A1c notes good control of blood sugar [5.9]  Hypoglycemia protocol.

## 2024-07-19 NOTE — PROGRESS NOTES
[facility]  Progress Note  Name: Anamaria Parnell I  MRN: [id number]  Unit/Bed#: -01 I Date of Admission: 7/16/2024   Date of Service: 7/19/2024 I Hospital Day: 3    Assessment & Plan   * Sepsis (HCC)  Assessment & Plan  Sepsis, likely present on admission, due to leukocytosis, hypothermia with suspected soft tissue infection of toe   On IV Cefazolin  S/p 1L IVF Bolus, now on maintenance fluids  blood c/s neg so far  Monitor wbc, any e/o fever        Gangrene (HCC)  Assessment & Plan  79 yoF former smoker and vasculopath with DM II,CAD,PAD with chronic right foot wounds now with right great toe gangrene.  Right great toe pain ongoing for months and has been worsening with increased toe pain & black discoloration and foul smell.    Started iv ancef for soft tissue infection  Pain management : tylenol, prn oxy, dilaudid (cautious with use due to her CKD   Vascular surgery on board, proceed with CTA abdomen with run off to further delineate her anatomy and decide upon vascular options.   Podiatry on board.      Acute renal failure superimposed on stage 3 chronic kidney disease (HCC)  Assessment & Plan  Lab Results   Component Value Date    EGFR 32 07/19/2024    EGFR 32 07/18/2024    EGFR 26 07/17/2024    CREATININE 1.53 (H) 07/19/2024    CREATININE 1.53 (H) 07/18/2024    CREATININE 1.79 (H) 07/17/2024   Admitted with acute on chronic kidney disease.  (Baseline  1.2-1.8)  Admitted with creatinine 2.19.   On gentle hydration. Improving, creatinine 1.53 this am  Nephrology on board, appreciate input    Aortoiliac occlusive disease (HCC)  Assessment & Plan  PAD with chronic right foot wounds now with right great toe gangrene.    Vascular surgery on board  On asa / brillinta / statin for now    Type 2 diabetes mellitus with diabetic nephropathy (HCC)  Assessment & Plan  Lab Results   Component Value Date    HGBA1C 5.9 (H) 07/09/2024       Recent Labs     07/18/24  1041 07/18/24  1611  24  2125 24  0547   POCGLU 119 128 100 102         Blood Sugar Average: Last 72 hrs:  (P) [id number]  Hold home Tradjenta during hospitalization  Patient placed on sliding scale coverage with ACHS checks  A1c notes good control of blood sugar [5.9]  Hypoglycemia protocol.    Basilar artery stenosis  Assessment & Plan  Patient with a history of carotid surgery.    Follows with Dr. Gallo             VTE Pharmacologic Prophylaxis: VTE Score: 5 heparin    Mobility:   Basic Mobility Inpatient Raw Score: 15  JH-HLM Goal: 4: Move to chair/commode  JH-HLM Achieved: 4: Move to chair/commode  JH-HLM Goal NOT achieved. Continue with multidisciplinary rounding and encourage appropriate mobility to improve upon JH-HLM goals.    Patient Centered Rounds: I performed bedside rounds with nursing staff today.   Discussions with Specialists or Other Care Team Provider: cm    Education and Discussions with Family / Patient:patient    Total Time Spent on Date of Encounter in care of patient: 35 mins. This time was spent on one or more of the following: performing physical exam; counseling and coordination of care; obtaining or reviewing history; documenting in the medical record; reviewing/ordering tests, medications or procedures; communicating with other healthcare professionals and discussing with patient's family/caregivers.    Current Length of Stay: 3 day(s)  Current Patient Status: Inpatient   Certification Statement: The patient will continue to require additional inpatient hospital stay due to needs further intervention  Discharge Plan: undetermined    Code Status: Level 3 - DNAR and DNI    Subjective:   No new concerns    Objective:     Vitals:   Temp (24hrs), Av.7 °F (36.5 °C), Min:97.6 °F (36.4 °C), Max:98 °F (36.7 °C)    Temp:  [97.6 °F (36.4 °C)-98 °F (36.7 °C)] 97.6 °F (36.4 °C)  HR:  [65-74] 72  Resp:  [16-17] 16  BP: (113-154)/(52-65) 154/65  SpO2:  [95 %-97 %] 97 %  Body mass index is 30.96  kg/m².     Input and Output Summary (last 24 hours):     Intake/Output Summary (Last 24 hours) at 7/19/2024 1121  Last data filed at 7/19/2024 0900  Gross per 24 hour   Intake 1043.75 ml   Output 0 ml   Net 1043.75 ml       Physical Exam:   Physical Exam  Constitutional:       General: She is not in acute distress.     Appearance: She is obese. She is not toxic-appearing.   HENT:      Mouth/Throat:      Mouth: Mucous membranes are moist.      Pharynx: Oropharynx is clear.   Cardiovascular:      Rate and Rhythm: Normal rate and regular rhythm.      Pulses: Normal pulses.   Pulmonary:      Effort: Pulmonary effort is normal. No respiratory distress.      Breath sounds: Normal breath sounds.   Neurological:      General: No focal deficit present.      Mental Status: She is alert and oriented to person, place, and time.          Additional Data:     Labs:  Results from last 7 days   Lab Units 07/19/24  0518   WBC Thousand/uL 13.35*   HEMOGLOBIN g/dL 10.8*   HEMATOCRIT % 33.5*   PLATELETS Thousands/uL 357   SEGS PCT % 83*   LYMPHO PCT % 6*   MONO PCT % 8   EOS PCT % 2     Results from last 7 days   Lab Units 07/19/24  0518 07/17/24  0512 07/16/24  1738   SODIUM mmol/L 140   < > 140   POTASSIUM mmol/L 3.8   < > 4.4   CHLORIDE mmol/L 108   < > 104   CO2 mmol/L 20*   < > 21   BUN mg/dL 20   < > 35*   CREATININE mg/dL 1.53*   < > 2.19*   ANION GAP mmol/L 12   < > 15*   CALCIUM mg/dL 8.1*   < > 8.8   ALBUMIN g/dL  --   --  3.8   TOTAL BILIRUBIN mg/dL  --   --  0.38   ALK PHOS U/L  --   --  96   ALT U/L  --   --  4*   AST U/L  --   --  7*   GLUCOSE RANDOM mg/dL 100   < > 106    < > = values in this interval not displayed.     Results from last 7 days   Lab Units 07/16/24  1738   INR  1.17     Results from last 7 days   Lab Units 07/19/24  1057 07/19/24  0547 07/18/24  2125 07/18/24  1611 07/18/24  1041 07/18/24  0607 07/17/24  2148 07/17/24  1543 07/17/24  1052 07/17/24  0628   POC GLUCOSE mg/dl 121 102 100 128 119 104 105 89  120 135               Lines/Drains:  Invasive Devices       Peripheral Intravenous Line  Duration             Peripheral IV 07/16/24 Left Antecubital 2 days                        Recent Cultures (last 7 days):   Results from last 7 days   Lab Units 07/17/24  0220 07/17/24  0156   BLOOD CULTURE   --  No Growth at 48 hrs.   URINE CULTURE  Culture too young- will reincubate  --        Last 24 Hours Medication List:   Current Facility-Administered Medications   Medication Dose Route Frequency Provider Last Rate    acetaminophen  975 mg Oral Q8H [facility] Des Longoria MD      allopurinol  100 mg Oral Daily Des Longoria MD      amLODIPine  5 mg Oral Daily Des Longoria MD      aspirin  81 mg Oral Daily Des Longoria MD      cefazolin  1,000 mg Intravenous Q12H Des Longoria MD 1,000 mg (07/19/24 1023)    cinacalcet  30 mg Oral Every Other Day Des Longoria MD      cloNIDine  1 patch Transdermal Weekly Des Longoria MD      escitalopram  20 mg Oral Daily Des Longoria MD      heparin (porcine)  5,000 Units Subcutaneous Q8H [facility] Des Longoria MD      HYDROmorphone  0.2 mg Intravenous Q6H PRN Des Longoria MD      insulin lispro  1-5 Units Subcutaneous TID AC Grzegorz Voss MD      labetalol  300 mg Oral Q12H [facility] Des Longoria MD      lidocaine  1 patch Topical Daily Des Longoria MD      losartan  100 mg Oral Daily Des Longoria MD      ondansetron  4 mg Intravenous Q6H PRN Des Longoria MD      oxyCODONE  5 mg Oral Q4H PRN Des Longoria MD      oxyCODONE  2.5 mg Oral Q4H PRN Des Longoria MD      pravastatin  80 mg Oral Daily With Dinner Des Longoria MD      ticagrelor  90 mg Oral Q12H JAVIER Des Longoria MD      traZODone  50 mg Oral HS Des Longoria MD          Today, Patient Was Seen By: Grzegorz Voss MD    **Please Note: This note may have been constructed using a voice recognition system.**

## 2024-07-19 NOTE — ASSESSMENT & PLAN NOTE
79 yoF former smoker and vasculopath with DM II,CAD,PAD with chronic right foot wounds now with right great toe gangrene.  Right great toe pain ongoing for months and has been worsening with increased toe pain & black discoloration and foul smell.    Started iv ancef for soft tissue infection  Pain management : tylenol, prn oxy, dilaudid (cautious with use due to her CKD   Vascular surgery on board, proceed with CTA abdomen with run off to further delineate her anatomy and decide upon vascular options.   Podiatry on board.

## 2024-07-19 NOTE — ASSESSMENT & PLAN NOTE
Lab Results   Component Value Date    EGFR 32 07/19/2024    EGFR 32 07/18/2024    EGFR 26 07/17/2024    CREATININE 1.53 (H) 07/19/2024    CREATININE 1.53 (H) 07/18/2024    CREATININE 1.79 (H) 07/17/2024   Admitted with acute on chronic kidney disease.  (Baseline  1.2-1.8)  Admitted with creatinine 2.19.   On gentle hydration. Improving, creatinine 1.53 this am  Nephrology on board, appreciate input

## 2024-07-19 NOTE — PLAN OF CARE
Problem: METABOLIC, FLUID AND ELECTROLYTES - ADULT  Goal: Electrolytes maintained within normal limits  Description: INTERVENTIONS:  - Monitor labs and assess patient for signs and symptoms of electrolyte imbalances  - Administer electrolyte replacement as ordered  - Monitor response to electrolyte replacements, including repeat lab results as appropriate  - Instruct patient on fluid and nutrition as appropriate  Outcome: Progressing  Goal: Fluid balance maintained  Description: INTERVENTIONS:  - Monitor labs   - Monitor I/O and WT  - Instruct patient on fluid and nutrition as appropriate  - Assess for signs & symptoms of volume excess or deficit  Outcome: Progressing  Goal: Glucose maintained within target range  Description: INTERVENTIONS:  - Monitor Blood Glucose as ordered  - Assess for signs and symptoms of hyperglycemia and hypoglycemia  - Administer ordered medications to maintain glucose within target range  - Assess nutritional intake and initiate nutrition service referral as needed  Outcome: Progressing        Problem: PAIN - ADULT  Goal: Verbalizes/displays adequate comfort level or baseline comfort level  Description: Interventions:  - Encourage patient to monitor pain and request assistance  - Assess pain using appropriate pain scale  - Administer analgesics based on type and severity of pain and evaluate response  - Implement non-pharmacological measures as appropriate and evaluate response  - Consider cultural and social influences on pain and pain management  - Notify physician/advanced practitioner if interventions unsuccessful or patient reports new pain  Outcome: Progressing

## 2024-07-19 NOTE — TRANSPORTATION MEDICAL NECESSITY
"Section I - General Information    Name of Patient: Anamaria Parnell                 : 1945    Medicare #: [id number]  Transport Date: 24 (PCS is valid for round trips on this date and for all repetitive trips in the 60-day range as noted below.)  Origin: [facility] 4TH FLOOR MED SURG UNIT                                                         Destination: [facility]   Is the pt's stay covered under Medicare Part A (PPS/DRG)   []     Closest appropriate facility? If no, why is transport to more distant facility required? Yes  If hospice pt, is this transport related to pt's terminal illness? No       Section II - Medical Necessity Questionnaire  Ambulance transportation is medically necessary only if other means of transport are contraindicated or would be potentially harmful to the patient. To meet this requirement, the patient must either be \"bed confined\" or suffer from a condition such that transport by means other than ambulance is contraindicated by the patient's condition. The following questions must be answered by the medical professional signing below for this form to be valid:    1)  Describe the MEDICAL CONDITION (physical and/or mental) of this patient AT THE TIME OF AMBULANCE TRANSPORT that requires the patient to be transported in an ambulance and why transport by other means is contraindicated by the patient's condition:     Patient will require extensive revascularization procedure given severe multilevel occlusive disease.  -Planning for right common femoral endarterectomy with retrograde right EIA stenting, as well as right femoral to tibial bypass.  -Discussed with patient and she is in agreement. She will require transfer to [facility] for procedure tentatively scheduled 2024    2) Is the patient \"bed confined\" as defined below?     Yes  To be \"be confined\" the patient must satisfy all three of the following conditions: (1) unable to get up from bed without " Assistance; AND (2) unable to ambulate; AND (3) unable to sit in a chair or wheelchair.    3) Can this patient safely be transported by car or wheelchair van (i.e., seated during transport without a medical attendant or monitoring)?   No    4) In addition to completing questions 1-3 above, please check any of the following conditions that apply*:   *Note: supporting documentation for any boxes checked must be maintained in the patient's medical records.  If hosp-hosp transfer, describe services needed at 2nd facility not available at 1st facility?   Unable to tolerate seated position for time needed to transport   Unable to sit in a chair or wheelchair due to decubitus ulcers or other wounds   Cardiac monitoring required en route       Section III - Signature of Physician or Healthcare Professional  I certify that the above information is true and correct based on my evaluation of this patient, and represent that the patient requires transport by ambulance and that other forms of transport are contraindicated. I understand that this information will be used by the Centers for Medicare and Medicaid Services (CMS) to support the determination of medical necessity for ambulance services, and I represent that I have personal knowledge of the patient's condition at time of transport.    []  If this box is checked, I also certify that the patient is physically or mentally incapable of signing the ambulance service's claim and that the institution with which I am affiliated has furnished care, services, or assistance to the patient.    My signature below is made on behalf of the patient pursuant to 42 CFR §424.36(b)(4). In accordance with 42 CFR §424.37, the specific reason(s) that the patient is physically or mentally incapable of signing the claim form is as follows: .      Signature of Physician* or Healthcare Professional_ Yoana Spang[facility]   Signature Date 07/19/24 (For scheduled repetitive transports,  this form is not valid for transports performed more than 60 days after this date)    Printed Name & Credentials of Physician or Healthcare Professional (MD, DO, RN, etc.)_Yoana HUMPHREYW   *Form must be signed by patient's attending physician for scheduled, repetitive transports. For non-repetitive, unscheduled ambulance transports, if unable to obtain the signature of the attending physician, any of the following may sign (choose appropriate option below)  [] Physician Assistant []  Clinical Nurse Specialist []  Registered Nurse  []  Nurse Practitioner  [x] Discharge Planner

## 2024-07-19 NOTE — ASSESSMENT & PLAN NOTE
Sepsis, likely present on admission, due to leukocytosis, hypothermia with suspected soft tissue infection of toe   On IV Cefazolin  S/p 1L IVF Bolus, now on maintenance fluids  blood c/s neg so far  Monitor wbc, any e/o fever

## 2024-07-19 NOTE — ASSESSMENT & PLAN NOTE
PAD with occlusion with multiple vessels  Vascular surgery on board -plan for extensive revascularization  Continue aspirin/ brillinta / statin for now

## 2024-07-19 NOTE — ASSESSMENT & PLAN NOTE
79 yoF former smoker and vasculopath with DM II,CAD,PAD with chronic right foot wounds now with right great toe gangrene.  Right great toe pain ongoing for months and has been worsening with increased toe pain & black discoloration and foul smell.    Started iv ancef for soft tissue infection  Pain management : tylenol, prn oxy, dilaudid (cautious with use due to her CKD   Podiatry on board.  Vascular surgery on board  She needs extensive vascular intervention to her right lower extremity therefore vascular surgery recommending transfer to [place] under [facility] for right common femoral endarterectomy with retrograde iliac stenting and femoral to tibial bypass  Being transferred to [place]. [facility] accepted. Patient in agreement

## 2024-07-19 NOTE — PLAN OF CARE
Problem: PAIN - ADULT  Goal: Verbalizes/displays adequate comfort level or baseline comfort level  Description: Interventions:  - Encourage patient to monitor pain and request assistance  - Assess pain using appropriate pain scale  - Administer analgesics based on type and severity of pain and evaluate response  - Implement non-pharmacological measures as appropriate and evaluate response  - Consider cultural and social influences on pain and pain management  - Notify physician/advanced practitioner if interventions unsuccessful or patient reports new pain  Outcome: Progressing     Problem: INFECTION - ADULT  Goal: Absence or prevention of progression during hospitalization  Description: INTERVENTIONS:  - Assess and monitor for signs and symptoms of infection  - Monitor lab/diagnostic results  - Monitor all insertion sites, i.e. indwelling lines, tubes, and drains  - Monitor endotracheal if appropriate and nasal secretions for changes in amount and color  - [place] appropriate cooling/warming therapies per order  - Administer medications as ordered  - Instruct and encourage patient and family to use good hand hygiene technique  - Identify and instruct in appropriate isolation precautions for identified infection/condition  Outcome: Progressing  Goal: Absence of fever/infection during neutropenic period  Description: INTERVENTIONS:  - Monitor WBC    Outcome: Progressing     Problem: SAFETY ADULT  Goal: Patient will remain free of falls  Description: INTERVENTIONS:  - Educate patient/family on patient safety including physical limitations  - Instruct patient to call for assistance with activity   - Consult OT/PT to assist with strengthening/mobility   - Keep Call bell within reach  - Keep bed low and locked with side rails adjusted as appropriate  - Keep care items and personal belongings within reach  - Initiate and maintain comfort rounds  - Make Fall Risk Sign visible to staff  - Offer Toileting   -  Initiate/Maintain alarm  - Obtain necessary fall risk management equipment  - Apply yellow socks and bracelet for high fall risk patients  - Consider moving patient to room near nurses station  Outcome: Progressing  Goal: Maintain or return to baseline ADL function  Description: INTERVENTIONS:  -  Assess patient's ability to carry out ADLs; assess patient's baseline for ADL function and identify physical deficits which impact ability to perform ADLs (bathing, care of mouth/teeth, toileting, grooming, dressing, etc.)  - Assess/evaluate cause of self-care deficits   - Assess range of motion  - Assess patient's mobility; develop plan if impaired  - Assess patient's need for assistive devices and provide as appropriate  - Encourage maximum independence but intervene and supervise when necessary  - Involve family in performance of ADLs  - Assess for home care needs following discharge   - Consider OT consult to assist with ADL evaluation and planning for discharge  - Provide patient education as appropriate  Outcome: Progressing  Goal: Maintains/Returns to pre admission functional level  Description: INTERVENTIONS:  - Perform AM-PAC 6 Click Basic Mobility/ Daily Activity assessment daily.  - Set and communicate daily mobility goal to care team and patient/family/caregiver.   - Collaborate with rehabilitation services on mobility goals if consulted  - Perform Range of Motion   - Reposition patient  - Dangle patient   - Stand patient   - Ambulate patient   - Out of bed to chair   - Out of bed for meals   - Out of bed for toileting  - Record patient progress and toleration of activity level   Outcome: Progressing     Problem: DISCHARGE PLANNING  Goal: Discharge to home or other facility with appropriate resources  Description: INTERVENTIONS:  - Identify barriers to discharge w/patient and caregiver  - Arrange for needed discharge resources and transportation as appropriate  - Identify discharge learning needs (meds, wound  care, etc.)  - Arrange for interpretive services to assist at discharge as needed  - Refer to Case Management Department for coordinating discharge planning if the patient needs post-hospital services based on physician/advanced practitioner order or complex needs related to functional status, cognitive ability, or social support system  Outcome: Progressing     Problem: Knowledge Deficit  Goal: Patient/family/caregiver demonstrates understanding of disease process, treatment plan, medications, and discharge instructions  Description: Complete learning assessment and assess knowledge base.  Interventions:  - Provide teaching at level of understanding  - Provide teaching via preferred learning methods  Outcome: Progressing     Problem: CARDIOVASCULAR - ADULT  Goal: Maintains optimal cardiac output and hemodynamic stability  Description: INTERVENTIONS:  - Monitor I/O, vital signs and rhythm  - Monitor for S/S and trends of decreased cardiac output  - Administer and titrate ordered vasoactive medications to optimize hemodynamic stability  - Assess quality of pulses, skin color and temperature  - Assess for signs of decreased coronary artery perfusion  - Instruct patient to report change in severity of symptoms  Outcome: Progressing     Problem: METABOLIC, FLUID AND ELECTROLYTES - ADULT  Goal: Electrolytes maintained within normal limits  Description: INTERVENTIONS:  - Monitor labs and assess patient for signs and symptoms of electrolyte imbalances  - Administer electrolyte replacement as ordered  - Monitor response to electrolyte replacements, including repeat lab results as appropriate  - Instruct patient on fluid and nutrition as appropriate  Outcome: Progressing  Goal: Fluid balance maintained  Description: INTERVENTIONS:  - Monitor labs   - Monitor I/O and WT  - Instruct patient on fluid and nutrition as appropriate  - Assess for signs & symptoms of volume excess or deficit  Outcome: Progressing  Goal: Glucose  maintained within target range  Description: INTERVENTIONS:  - Monitor Blood Glucose as ordered  - Assess for signs and symptoms of hyperglycemia and hypoglycemia  - Administer ordered medications to maintain glucose within target range  - Assess nutritional intake and initiate nutrition service referral as needed  Outcome: Progressing     Problem: HEMATOLOGIC - ADULT  Goal: Maintains hematologic stability  Description: INTERVENTIONS  - Assess for signs and symptoms of bleeding or hemorrhage  - Monitor labs  - Administer supportive blood products/factors as ordered and appropriate  Outcome: Progressing     Problem: MUSCULOSKELETAL - ADULT  Goal: Maintain or return mobility to safest level of function  Description: INTERVENTIONS:  - Assess patient's ability to carry out ADLs; assess patient's baseline for ADL function and identify physical deficits which impact ability to perform ADLs (bathing, care of mouth/teeth, toileting, grooming, dressing, etc.)  - Assess/evaluate cause of self-care deficits   - Assess range of motion  - Assess patient's mobility  - Assess patient's need for assistive devices and provide as appropriate  - Encourage maximum independence but intervene and supervise when necessary  - Involve family in performance of ADLs  - Assess for home care needs following discharge   - Consider OT consult to assist with ADL evaluation and planning for discharge  - Provide patient education as appropriate  Outcome: Progressing  Goal: Maintain proper alignment of affected body part  Description: INTERVENTIONS:  - Support, maintain and protect limb and body alignment  - Provide patient/ family with appropriate education  Outcome: Progressing

## 2024-07-19 NOTE — ASSESSMENT & PLAN NOTE
79 yoF former smoker and vasculopath with DM II,CAD,PAD with chronic right foot wounds x months and eventually developed right great toe gangrene.     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  Patient will require extensive revascularization.  Tentative procedure on 7/24/2024  Cardiology consulted for preop clearance  Podiatry on board.

## 2024-07-19 NOTE — PROGRESS NOTES
NEPHROLOGY PROGRESS NOTE    Patient: Anamaria Parnell               Sex: female          DOA: 7/16/2024  5:12 PM   YOB: 1945        Age:  79 y.o.        LOS:  LOS: 3 days   7/19/2024    REASON FOR THE CONSULTATION:  RICARDO on CKD    SUBJECTIVE     Patient was seen and examined at the bedside. Denies nausea, vomiting, or shortness of breath.     Reviewed past 24 hour events.    CURRENT MEDICATIONS       Current Facility-Administered Medications:     acetaminophen (TYLENOL) tablet 975 mg, 975 mg, Oral, Q8H JAVIER, Des Longoria MD, 975 mg at 07/19/24 0541    allopurinol (ZYLOPRIM) tablet 100 mg, 100 mg, Oral, Daily, Des Longoria MD, 100 mg at 07/19/24 1022    amLODIPine (NORVASC) tablet 5 mg, 5 mg, Oral, Daily, Des Longoria MD, 5 mg at 07/19/24 1022    aspirin chewable tablet 81 mg, 81 mg, Oral, Daily, Des Longoria MD, 81 mg at 07/19/24 1022    ceFAZolin (ANCEF) IVPB (premix in dextrose) 1,000 mg 50 mL, 1,000 mg, Intravenous, Q12H, Des Longoria MD, Last Rate: 100 mL/hr at 07/19/24 1023, 1,000 mg at 07/19/24 1023    cinacalcet (SENSIPAR) tablet 30 mg, 30 mg, Oral, Every Other Day, Des Longoria MD, 30 mg at 07/18/24 1028    cloNIDine (CATAPRES-TTS-3) 0.3 mg/24 hr TD weekly patch, 1 patch, Transdermal, Weekly, Des Longoria MD    escitalopram (LEXAPRO) tablet 20 mg, 20 mg, Oral, Daily, Des Longoria MD, 20 mg at 07/19/24 1022    heparin (porcine) subcutaneous injection 5,000 Units, 5,000 Units, Subcutaneous, Q8H JAVIER, 5,000 Units at 07/19/24 0541 **AND** [CANCELED] Platelet count, , , Once, Des Longoria MD    HYDROmorphone HCl (DILAUDID) injection 0.2 mg, 0.2 mg, Intravenous, Q6H PRN, Des Longoria MD    insulin lispro (HumALOG/ADMELOG) 100 units/mL subcutaneous injection 1-5 Units, 1-5 Units, Subcutaneous, TID AC **AND** Fingerstick Glucose (POCT), , , TID AC, Grzegorz Voss MD    labetalol (NORMODYNE) tablet 300 mg, 300 mg, Oral, Q12H JAVIER, Des Thorpe  MD Von, 300 mg at 07/19/24 1022    lidocaine (LIDODERM) 5 % patch 1 patch, 1 patch, Topical, Daily, Des Longoria MD    losartan (COZAAR) tablet 100 mg, 100 mg, Oral, Daily, Des Longoria MD, 100 mg at 07/19/24 1023    ondansetron (ZOFRAN) injection 4 mg, 4 mg, Intravenous, Q6H PRN, Des Longoria MD    oxyCODONE (ROXICODONE) IR tablet 5 mg, 5 mg, Oral, Q4H PRN, Des Longoria MD    oxyCODONE (ROXICODONE) split tablet 2.5 mg, 2.5 mg, Oral, Q4H PRN, Des Longoria MD    pravastatin (PRAVACHOL) tablet 80 mg, 80 mg, Oral, Daily With Dinner, Des Longoria MD, 80 mg at 07/18/24 1624    ticagrelor (BRILINTA) tablet 90 mg, 90 mg, Oral, Q12H JAVIER, Des Longoria MD, 90 mg at 07/19/24 1023    traZODone (DESYREL) tablet 50 mg, 50 mg, Oral, HS, Des Longoria MD, 50 mg at 07/18/24 2225    REVIEW OF SYSTEMS     Review of Systems   Constitutional:  Negative for activity change, chills, fatigue and fever.   HENT:  Negative for trouble swallowing.    Respiratory:  Negative for shortness of breath.    Cardiovascular:  Negative for leg swelling.   Gastrointestinal:  Negative for nausea and vomiting.   Genitourinary:  Negative for difficulty urinating, dysuria, frequency and hematuria.   Musculoskeletal:  Negative for back pain.   Skin:  Negative for pallor.   Neurological:  Negative for dizziness, syncope, weakness and light-headedness.   Psychiatric/Behavioral:  Negative for sleep disturbance. The patient is not nervous/anxious.        OBJECTIVE     Current Weight: Weight - Scale: 67.2 kg (148 lb 2.4 oz)  Vitals:    07/19/24 0714   BP: 154/65   Pulse: 72   Resp: 16   Temp: 97.6 °F (36.4 °C)   SpO2: 97%     Body mass index is 30.96 kg/m².    Intake/Output Summary (Last 24 hours) at 7/19/2024 1037  Last data filed at 7/19/2024 0900  Gross per 24 hour   Intake 1263.75 ml   Output 0 ml   Net 1263.75 ml       PHYSICAL EXAMINATION     Physical Exam  Vitals reviewed.   Constitutional:       General:  She is not in acute distress.  HENT:      Head: Normocephalic.      Mouth/Throat:      Lips: Pink.      Mouth: Mucous membranes are moist.   Eyes:      General: Lids are normal. No scleral icterus.  Cardiovascular:      Rate and Rhythm: Normal rate and regular rhythm.      Heart sounds: S1 normal and S2 normal. No murmur heard.  Pulmonary:      Effort: Pulmonary effort is normal. No accessory muscle usage or respiratory distress.   Abdominal:      General: There is no distension.      Tenderness: There is no abdominal tenderness.   Musculoskeletal:      Cervical back: Normal range of motion and neck supple. No tenderness.      Right lower leg: No edema.      Left lower leg: No edema.   Skin:     General: Skin is warm.      Coloration: Skin is not cyanotic or jaundiced.      Comments: Right great toe blackened   Neurological:      General: No focal deficit present.      Mental Status: She is alert and oriented to person, place, and time.   Psychiatric:         Attention and Perception: Attention normal.         Speech: Speech normal.         Behavior: Behavior is cooperative.           LAB RESULTS     Results from last 7 days   Lab Units 07/19/24  0518 07/18/24  0537 07/17/24  0512 07/17/24  0156 07/16/24  1738   WBC Thousand/uL 13.35* 12.31* 14.69* 16.56* 14.08*   HEMOGLOBIN g/dL 10.8* 10.4* 9.7* 11.3* 11.1*   HEMATOCRIT % 33.5* 32.1* 30.4* 33.4* 33.5*   PLATELETS Thousands/uL 357 333 342 359 397*   SODIUM mmol/L 140 140 137  --  140   POTASSIUM mmol/L 3.8 3.9 3.9  --  4.4   CHLORIDE mmol/L 108 109* 106  --  104   CO2 mmol/L 20* 21 20*  --  21   BUN mg/dL 20 22 31*  --  35*   CREATININE mg/dL 1.53* 1.53* 1.79*  --  2.19*   EGFR ml/min/1.73sq m 32 32 26  --  21   CALCIUM mg/dL 8.1* 8.2* 7.6*  --  8.8       RADIOLOGY RESULTS      Results for orders placed during the hospital encounter of 07/16/24    XR chest portable    Narrative  XR CHEST PORTABLE    INDICATION: hypothermia.    COMPARISON: Compared with  "1/22/2022    FINDINGS:  Poor inspiratory effort.  Clear lungs. No pneumothorax or pleural effusion.    Normal cardiomediastinal silhouette.    Bilateral glenohumeral degenerative changes.    Normal upper abdomen.    Impression  No acute cardiopulmonary disease.        Workstation performed: [id number]    No results found for this or any previous visit.      ASSESSMENT/PLAN     79 year old female with a past medical history significant for CKD stage 3B/4, T2DM, CVA, PVD, carotid stenosis, obesity, and HTN who presented to our facility with right toe gangrene.     RICARDO on CKD 3B/4:  After review of the medical record, baseline creatinine fluctuates around 1.6 - 1.9. Presented with creatinine of 2.19 and elevated. Etiology of RICARDO suspected secondary to sepsis from soft tissue infection.     Received CT angiogram for further evaluation, was provided with IVF pre and post for RICARDO risk reduction. Current creatinine level is 1.53 and within baseline. Will monitor daily BMP.     Gangrene of the right toe:  Likely due to PVD, underwent CTA yesterday for further evaluation. Podiatry and Vascular Surgery following. Patient considering further intervention.     Hypertension:  Blood pressure within acceptable range on the current regimen.       GRACIELA Palacio  Nephrology  7/19/2024      Portions of the record may have been created with voice recognition software. Occasional wrong word or \"sound a like\" substitutions may have occurred due to the inherent limitations of voice recognition software. Read the chart carefully and recognize, using context, where substitutions have occurred.   "

## 2024-07-19 NOTE — ASSESSMENT & PLAN NOTE
Lab Results   Component Value Date    EGFR 32 07/19/2024    EGFR 32 07/18/2024    EGFR 26 07/17/2024    CREATININE 1.53 (H) 07/19/2024    CREATININE 1.53 (H) 07/18/2024    CREATININE 1.79 (H) 07/17/2024     Baseline creatinine 1.2-1.8, currently around baseline  Initially had RICARDO on CKD at the time of admission at [facility]  Avoid nephrotoxic agent, hypotension

## 2024-07-19 NOTE — ASSESSMENT & PLAN NOTE
Patient presenting with right big toe gangrene and associated cellulitis  Patient remains afebrile, nontoxic  Continue IV Ancef for now

## 2024-07-19 NOTE — PROGRESS NOTES
Critical access hospital  Progress Note  Name: Anamaria Parnell I  MRN: [id number]  Unit/Bed#: -01 I Date of Admission: 7/16/2024   Date of Service: 7/19/2024 I Hospital Day: 3    Assessment & Plan   Gangrene (HCC)  Assessment & Plan  Patient is a 78 yo female, former smoker,  with PMH obesity, CKD IV, DM II, HLD, HTN, CAD, renal artery stenosis, mesenteric artery stenosis, AIOD/PAD with chronic right foot wounds, L MCA and PCA CVA s/p L TCAR 9/7/23 ([place]). Patient admitted to [facility] on 7/16/2024 with worsening right great toe pain and chronic right great toe gangrene. Patient follows with vascular surgery for PAD and chronic limb ischemia. Vascular surgery consulted to evaluate peripheral perfusion and possible revascularization options.    Imaging:  CTA abdomen with runoff: pending  TEA 7/16/24:    R 0.11/--/--; Severe fem-pop disease c HG stenosis/occlusion prox-distal SFA c recon popliteal. Tib/peroneal occlusive disease. Distal YOVANY occlusion.    L 0.31/20/19; Severe diffuse fem-pop disease with HG v occlusion prox-mid SFA with recon distal SFA. Tib/Peronea occlusive disease. Entire PT artery high grade stenosis v occlusion.   Lower extremity vein mapping 7/18/2024:  RIGHT LOWER LIMB:  The great saphenous vein is patent  from the groin to the ankle.  The intraluminal diameter measurements range from 2.2mm to 6.4mm throughout.  LEFT LOWER LIMB:  The great saphenous vein is patent  from the groin to the ankle.  The intraluminal diameter measurements range from 1.3mm to 6.8mm throughout.    Plan:   -CLTI with advanced calcific atherosclerotic occlusive disease and R hallux dry gangrene and pain. Afebrile, non-toxic   -Recurrent R foot cellulitis which appears improving on antibiotics with cephalexin.  -Patient will require extensive revascularization procedure given severe multilevel occlusive disease.  -Planning for right common femoral endarterectomy with retrograde right EIA stenting, as well as  "right femoral to tibial bypass.  -Discussed with patient and she is in agreement. She will require transfer to [facility] for procedure tentatively scheduled 7/24/2024  -Cardiology consulted for preoperative risk stratification  -Trend WBC and follow-up blood cultures  -Continue medical optimization with aspirin and ticagrelor  -Pain and medical management as per im/admitting team  -Local wound care as per podiatry  -Discussed with SLIM  -D/w       Subjective:  Patient evaluate resting comfortably in recliner, reports significant pain to her right great toe. Patient verbalizes understanding of recommendations and is agreeable with transfer to [facility] for revascularization procedure.        Vitals:  /65   Pulse 72   Temp 97.6 °F (36.4 °C)   Resp 16   Ht 4' 10\" (1.473 m)   Wt 67.2 kg (148 lb 2.4 oz)   LMP  (LMP Unknown)   SpO2 97%   BMI 30.96 kg/m²     I/Os:  I/O last 3 completed shifts:  In: 1303.8 [P.O.:1000; I.V.:303.8]  Out: 625 [Urine:625]  I/O this shift:  In: 600 [P.O.:600]  Out: -       Lab Results and Cultures:   CBC with diff:   Lab Results   Component Value Date    WBC 13.35 (H) 07/19/2024    HGB 10.8 (L) 07/19/2024    HCT 33.5 (L) 07/19/2024    MCV 88 07/19/2024     07/19/2024    RBC 3.80 (L) 07/19/2024    MCH 28.4 07/19/2024    MCHC 32.2 07/19/2024    RDW 13.7 07/19/2024    MPV 9.9 07/19/2024    NRBC 0 07/19/2024   ,   BMP/CMP:  Lab Results   Component Value Date    SODIUM 140 07/19/2024    SODIUM 138 10/20/2020    K 3.8 07/19/2024    K 3.8 10/20/2020     07/19/2024    CL 98 10/20/2020    CO2 20 (L) 07/19/2024    CO2 22 09/07/2023    CO2 23 10/20/2020    BUN 20 07/19/2024    BUN 45 (H) 10/20/2020    CREATININE 1.53 (H) 07/19/2024    GLUCOSE 130 09/07/2023    CALCIUM 8.1 (L) 07/19/2024    AST 7 (L) 07/16/2024    AST 14 09/08/2020    ALT 4 (L) 07/16/2024    ALT 13 09/08/2020    ALKPHOS 96 07/16/2024    EGFR 32 07/19/2024   ,   Lipid Panel: No results found for: \"CHOL\",   Coags: " "  Lab Results   Component Value Date    PTT 41 (H) 07/16/2024    INR 1.17 07/16/2024   ,     Blood Culture:   Lab Results   Component Value Date    BLOODCX No Growth at 48 hrs. 07/17/2024   ,   Urinalysis:   Lab Results   Component Value Date    COLORU Light Yellow 07/17/2024    CLARITYU Clear 07/17/2024    SPECGRAV 1.016 07/17/2024    PHUR 5.5 07/17/2024    LEUKOCYTESUR Moderate (A) 07/17/2024    NITRITE Negative 07/17/2024    GLUCOSEU Negative 07/17/2024    KETONESU Negative 07/17/2024    BILIRUBINUR Negative 07/17/2024    BLOODU Negative 07/17/2024   ,   Urine Culture:   Lab Results   Component Value Date    URINECX Culture too young- will reincubate 07/17/2024   ,   Wound Culure: No results found for: \"WOUNDCULT\"    Medications:  Current Facility-Administered Medications   Medication Dose Route Frequency    acetaminophen (TYLENOL) tablet 975 mg  975 mg Oral Q8H [facility]    allopurinol (ZYLOPRIM) tablet 100 mg  100 mg Oral Daily    amLODIPine (NORVASC) tablet 5 mg  5 mg Oral Daily    aspirin chewable tablet 81 mg  81 mg Oral Daily    ceFAZolin (ANCEF) IVPB (premix in dextrose) 1,000 mg 50 mL  1,000 mg Intravenous Q12H    cinacalcet (SENSIPAR) tablet 30 mg  30 mg Oral Every Other Day    cloNIDine (CATAPRES-TTS-3) 0.3 mg/24 hr TD weekly patch  1 patch Transdermal Weekly    escitalopram (LEXAPRO) tablet 20 mg  20 mg Oral Daily    heparin (porcine) subcutaneous injection 5,000 Units  5,000 Units Subcutaneous Q8H JAVIER    HYDROmorphone HCl (DILAUDID) injection 0.2 mg  0.2 mg Intravenous Q6H PRN    insulin lispro (HumALOG/ADMELOG) 100 units/mL subcutaneous injection 1-5 Units  1-5 Units Subcutaneous TID AC    labetalol (NORMODYNE) tablet 300 mg  300 mg Oral Q12H [facility]    lidocaine (LIDODERM) 5 % patch 1 patch  1 patch Topical Daily    losartan (COZAAR) tablet 100 mg  100 mg Oral Daily    ondansetron (ZOFRAN) injection 4 mg  4 mg Intravenous Q6H PRN    oxyCODONE (ROXICODONE) IR tablet 5 mg  5 mg Oral Q4H PRN    oxyCODONE " (ROXICODONE) split tablet 2.5 mg  2.5 mg Oral Q4H PRN    pravastatin (PRAVACHOL) tablet 80 mg  80 mg Oral Daily With Dinner    ticagrelor (BRILINTA) tablet 90 mg  90 mg Oral Q12H JAVIER    traZODone (DESYREL) tablet 50 mg  50 mg Oral HS       Physical Exam:     General appearance: NAD  Lungs: clear to auscultation bilaterally, normal effort  Heart: regular rate and rhythm, S1, S2 normal  Abdomen: soft, non-tender; bowel sounds normal  Extremities:  warm, perfused, m/s intact, R great toe dry gangrene   Neurologic: Grossly normal, no focal deficit    Wound/Incision:               Pulse exam:  DP: Right: non-palpable Left: non-palpable  PT: Right: non-palpable Left: non-palpable    GRACIELA Aguero  7/19/2024

## 2024-07-20 PROBLEM — I25.118 CORONARY ARTERY DISEASE OF NATIVE ARTERY OF NATIVE HEART WITH STABLE ANGINA PECTORIS (HCC): Status: ACTIVE | Noted: 2024-07-20

## 2024-07-20 LAB
ANION GAP SERPL CALCULATED.3IONS-SCNC: 12 MMOL/L (ref 4–13)
BASOPHILS # BLD AUTO: 0.06 THOUSANDS/ÂΜL (ref 0–0.1)
BASOPHILS NFR BLD AUTO: 1 % (ref 0–1)
BUN SERPL-MCNC: 19 MG/DL (ref 5–25)
CALCIUM SERPL-MCNC: 8 MG/DL (ref 8.4–10.2)
CHLORIDE SERPL-SCNC: 108 MMOL/L (ref 96–108)
CO2 SERPL-SCNC: 19 MMOL/L (ref 21–32)
CREAT SERPL-MCNC: 1.49 MG/DL (ref 0.6–1.3)
EOSINOPHIL # BLD AUTO: 0.28 THOUSAND/ÂΜL (ref 0–0.61)
EOSINOPHIL NFR BLD AUTO: 3 % (ref 0–6)
ERYTHROCYTE [DISTWIDTH] IN BLOOD BY AUTOMATED COUNT: 13.7 % (ref 11.6–15.1)
GFR SERPL CREATININE-BSD FRML MDRD: 33 ML/MIN/1.73SQ M
GLUCOSE SERPL-MCNC: 104 MG/DL (ref 65–140)
GLUCOSE SERPL-MCNC: 135 MG/DL (ref 65–140)
GLUCOSE SERPL-MCNC: 142 MG/DL (ref 65–140)
GLUCOSE SERPL-MCNC: 148 MG/DL (ref 65–140)
GLUCOSE SERPL-MCNC: 84 MG/DL (ref 65–140)
HCT VFR BLD AUTO: 30.4 % (ref 34.8–46.1)
HGB BLD-MCNC: 9.9 G/DL (ref 11.5–15.4)
IMM GRANULOCYTES # BLD AUTO: 0.1 THOUSAND/UL (ref 0–0.2)
IMM GRANULOCYTES NFR BLD AUTO: 1 % (ref 0–2)
IRON SATN MFR SERPL: 18 % (ref 15–50)
IRON SERPL-MCNC: 21 UG/DL (ref 50–212)
LYMPHOCYTES # BLD AUTO: 0.9 THOUSANDS/ÂΜL (ref 0.6–4.47)
LYMPHOCYTES NFR BLD AUTO: 8 % (ref 14–44)
MCH RBC QN AUTO: 28.9 PG (ref 26.8–34.3)
MCHC RBC AUTO-ENTMCNC: 32.6 G/DL (ref 31.4–37.4)
MCV RBC AUTO: 89 FL (ref 82–98)
MONOCYTES # BLD AUTO: 0.87 THOUSAND/ÂΜL (ref 0.17–1.22)
MONOCYTES NFR BLD AUTO: 8 % (ref 4–12)
NEUTROPHILS # BLD AUTO: 8.97 THOUSANDS/ÂΜL (ref 1.85–7.62)
NEUTS SEG NFR BLD AUTO: 79 % (ref 43–75)
NRBC BLD AUTO-RTO: 0 /100 WBCS
PLATELET # BLD AUTO: 343 THOUSANDS/UL (ref 149–390)
PMV BLD AUTO: 10.4 FL (ref 8.9–12.7)
POTASSIUM SERPL-SCNC: 3.9 MMOL/L (ref 3.5–5.3)
RBC # BLD AUTO: 3.43 MILLION/UL (ref 3.81–5.12)
SODIUM SERPL-SCNC: 139 MMOL/L (ref 135–147)
TIBC SERPL-MCNC: 116 UG/DL (ref 250–450)
UIBC SERPL-MCNC: 95 UG/DL (ref 155–355)
WBC # BLD AUTO: 11.18 THOUSAND/UL (ref 4.31–10.16)

## 2024-07-20 PROCEDURE — NC001 PR NO CHARGE

## 2024-07-20 PROCEDURE — 99223 1ST HOSP IP/OBS HIGH 75: CPT

## 2024-07-20 PROCEDURE — 99232 SBSQ HOSP IP/OBS MODERATE 35: CPT | Performed by: SURGERY

## 2024-07-20 PROCEDURE — 82948 REAGENT STRIP/BLOOD GLUCOSE: CPT

## 2024-07-20 PROCEDURE — 85025 COMPLETE CBC W/AUTO DIFF WBC: CPT | Performed by: INTERNAL MEDICINE

## 2024-07-20 PROCEDURE — 83550 IRON BINDING TEST: CPT | Performed by: NURSE PRACTITIONER

## 2024-07-20 PROCEDURE — 99232 SBSQ HOSP IP/OBS MODERATE 35: CPT | Performed by: INTERNAL MEDICINE

## 2024-07-20 PROCEDURE — 80048 BASIC METABOLIC PNL TOTAL CA: CPT | Performed by: INTERNAL MEDICINE

## 2024-07-20 PROCEDURE — 83540 ASSAY OF IRON: CPT | Performed by: NURSE PRACTITIONER

## 2024-07-20 RX ORDER — SODIUM BICARBONATE 650 MG/1
650 TABLET ORAL
Status: DISCONTINUED | OUTPATIENT
Start: 2024-07-20 | End: 2024-08-09 | Stop reason: HOSPADM

## 2024-07-20 RX ORDER — AMLODIPINE BESYLATE 5 MG/1
5 TABLET ORAL DAILY
Status: DISCONTINUED | OUTPATIENT
Start: 2024-07-20 | End: 2024-07-27

## 2024-07-20 RX ORDER — LOSARTAN POTASSIUM 50 MG/1
100 TABLET ORAL DAILY
Status: DISCONTINUED | OUTPATIENT
Start: 2024-07-20 | End: 2024-07-20

## 2024-07-20 RX ADMIN — LOSARTAN POTASSIUM 100 MG: 50 TABLET, FILM COATED ORAL at 08:18

## 2024-07-20 RX ADMIN — TRAZODONE HYDROCHLORIDE 50 MG: 50 TABLET ORAL at 22:01

## 2024-07-20 RX ADMIN — CEFAZOLIN SODIUM 1000 MG: 1 SOLUTION INTRAVENOUS at 08:21

## 2024-07-20 RX ADMIN — CINACALCET 30 MG: 30 TABLET, FILM COATED ORAL at 08:18

## 2024-07-20 RX ADMIN — LIDOCAINE 5% 1 PATCH: 700 PATCH TOPICAL at 08:27

## 2024-07-20 RX ADMIN — HEPARIN SODIUM 5000 UNITS: 5000 INJECTION INTRAVENOUS; SUBCUTANEOUS at 15:00

## 2024-07-20 RX ADMIN — LABETALOL HYDROCHLORIDE 300 MG: 200 TABLET, FILM COATED ORAL at 08:18

## 2024-07-20 RX ADMIN — HEPARIN SODIUM 5000 UNITS: 5000 INJECTION INTRAVENOUS; SUBCUTANEOUS at 22:01

## 2024-07-20 RX ADMIN — TICAGRELOR 90 MG: 90 TABLET ORAL at 08:17

## 2024-07-20 RX ADMIN — AMLODIPINE BESYLATE 5 MG: 5 TABLET ORAL at 08:19

## 2024-07-20 RX ADMIN — TICAGRELOR 90 MG: 90 TABLET ORAL at 22:04

## 2024-07-20 RX ADMIN — SODIUM BICARBONATE 650 MG TABLET 650 MG: at 16:55

## 2024-07-20 RX ADMIN — SODIUM BICARBONATE 650 MG TABLET 650 MG: at 08:31

## 2024-07-20 RX ADMIN — ACETAMINOPHEN 975 MG: 325 TABLET, FILM COATED ORAL at 22:00

## 2024-07-20 RX ADMIN — ALLOPURINOL 100 MG: 100 TABLET ORAL at 08:19

## 2024-07-20 RX ADMIN — ASPIRIN 81 MG CHEWABLE TABLET 81 MG: 81 TABLET CHEWABLE at 08:18

## 2024-07-20 RX ADMIN — LABETALOL HYDROCHLORIDE 300 MG: 200 TABLET, FILM COATED ORAL at 22:00

## 2024-07-20 RX ADMIN — ACETAMINOPHEN 975 MG: 325 TABLET, FILM COATED ORAL at 06:59

## 2024-07-20 RX ADMIN — ACETAMINOPHEN 975 MG: 325 TABLET, FILM COATED ORAL at 15:00

## 2024-07-20 RX ADMIN — CEFAZOLIN SODIUM 1000 MG: 1 SOLUTION INTRAVENOUS at 21:57

## 2024-07-20 RX ADMIN — PRAVASTATIN SODIUM 80 MG: 80 TABLET ORAL at 16:55

## 2024-07-20 RX ADMIN — ESCITALOPRAM OXALATE 20 MG: 20 TABLET ORAL at 08:18

## 2024-07-20 RX ADMIN — HEPARIN SODIUM 5000 UNITS: 5000 INJECTION INTRAVENOUS; SUBCUTANEOUS at 06:59

## 2024-07-20 RX ADMIN — OXYCODONE HYDROCHLORIDE 5 MG: 5 TABLET ORAL at 17:36

## 2024-07-20 NOTE — PLAN OF CARE
Problem: PAIN - ADULT  Goal: Verbalizes/displays adequate comfort level or baseline comfort level  Description: Interventions:  - Encourage patient to monitor pain and request assistance  - Assess pain using appropriate pain scale  - Administer analgesics based on type and severity of pain and evaluate response  - Implement non-pharmacological measures as appropriate and evaluate response  - Consider cultural and social influences on pain and pain management  - Notify physician/advanced practitioner if interventions unsuccessful or patient reports new pain  Outcome: Progressing     Problem: INFECTION - ADULT  Goal: Absence or prevention of progression during hospitalization  Description: INTERVENTIONS:  - Assess and monitor for signs and symptoms of infection  - Monitor lab/diagnostic results  - Monitor all insertion sites, i.e. indwelling lines, tubes, and drains  - Monitor endotracheal if appropriate and nasal secretions for changes in amount and color  - Martin appropriate cooling/warming therapies per order  - Administer medications as ordered  - Instruct and encourage patient and family to use good hand hygiene technique  - Identify and instruct in appropriate isolation precautions for identified infection/condition  Outcome: Progressing  Goal: Absence of fever/infection during neutropenic period  Description: INTERVENTIONS:  - Monitor WBC    Outcome: Progressing     Problem: SAFETY ADULT  Goal: Patient will remain free of falls  Description: INTERVENTIONS:  - Educate patient/family on patient safety including physical limitations  - Instruct patient to call for assistance with activity   - Consult OT/PT to assist with strengthening/mobility   - Keep Call bell within reach  - Keep bed low and locked with side rails adjusted as appropriate  - Keep care items and personal belongings within reach  - Initiate and maintain comfort rounds  - Make Fall Risk Sign visible to staff  - Offer Toileting every 2 Hours,  in advance of need  - Initiate/Maintain bed alarm  - Obtain necessary fall risk management equipment: socks  - Apply yellow socks and bracelet for high fall risk patients  - Consider moving patient to room near nurses station  Outcome: Progressing  Goal: Maintain or return to baseline ADL function  Description: INTERVENTIONS:  -  Assess patient's ability to carry out ADLs; assess patient's baseline for ADL function and identify physical deficits which impact ability to perform ADLs (bathing, care of mouth/teeth, toileting, grooming, dressing, etc.)  - Assess/evaluate cause of self-care deficits   - Assess range of motion  - Assess patient's mobility; develop plan if impaired  - Assess patient's need for assistive devices and provide as appropriate  - Encourage maximum independence but intervene and supervise when necessary  - Involve family in performance of ADLs  - Assess for home care needs following discharge   - Consider OT consult to assist with ADL evaluation and planning for discharge  - Provide patient education as appropriate  Outcome: Progressing  Goal: Maintains/Returns to pre admission functional level  Description: INTERVENTIONS:  - Perform AM-PAC 6 Click Basic Mobility/ Daily Activity assessment daily.  - Set and communicate daily mobility goal to care team and patient/family/caregiver.   - Collaborate with rehabilitation services on mobility goals if consulted  - Perform Range of Motion 4 times a day.  - Reposition patient every 2 hours.  - Dangle patient 3 times a day  - Stand patient 3 times a day  - Ambulate patient 3 times a day  - Out of bed to chair 3 times a day   - Out of bed for meals 3 times a day  - Out of bed for toileting  - Record patient progress and toleration of activity level   Outcome: Progressing     Problem: DISCHARGE PLANNING  Goal: Discharge to home or other facility with appropriate resources  Description: INTERVENTIONS:  - Identify barriers to discharge w/patient and  caregiver  - Arrange for needed discharge resources and transportation as appropriate  - Identify discharge learning needs (meds, wound care, etc.)  - Arrange for interpretive services to assist at discharge as needed  - Refer to Case Management Department for coordinating discharge planning if the patient needs post-hospital services based on physician/advanced practitioner order or complex needs related to functional status, cognitive ability, or social support system  Outcome: Progressing     Problem: Knowledge Deficit  Goal: Patient/family/caregiver demonstrates understanding of disease process, treatment plan, medications, and discharge instructions  Description: Complete learning assessment and assess knowledge base.  Interventions:  - Provide teaching at level of understanding  - Provide teaching via preferred learning methods  Outcome: Progressing     Problem: CARDIOVASCULAR - ADULT  Goal: Maintains optimal cardiac output and hemodynamic stability  Description: INTERVENTIONS:  - Monitor I/O, vital signs and rhythm  - Monitor for S/S and trends of decreased cardiac output  - Administer and titrate ordered vasoactive medications to optimize hemodynamic stability  - Assess quality of pulses, skin color and temperature  - Assess for signs of decreased coronary artery perfusion  - Instruct patient to report change in severity of symptoms  Outcome: Progressing     Problem: METABOLIC, FLUID AND ELECTROLYTES - ADULT  Goal: Electrolytes maintained within normal limits  Description: INTERVENTIONS:  - Monitor labs and assess patient for signs and symptoms of electrolyte imbalances  - Administer electrolyte replacement as ordered  - Monitor response to electrolyte replacements, including repeat lab results as appropriate  - Instruct patient on fluid and nutrition as appropriate  Outcome: Progressing  Goal: Fluid balance maintained  Description: INTERVENTIONS:  - Monitor labs   - Monitor I/O and WT  - Instruct patient on  fluid and nutrition as appropriate  - Assess for signs & symptoms of volume excess or deficit  Outcome: Progressing  Goal: Glucose maintained within target range  Description: INTERVENTIONS:  - Monitor Blood Glucose as ordered  - Assess for signs and symptoms of hyperglycemia and hypoglycemia  - Administer ordered medications to maintain glucose within target range  - Assess nutritional intake and initiate nutrition service referral as needed  Outcome: Progressing     Problem: HEMATOLOGIC - ADULT  Goal: Maintains hematologic stability  Description: INTERVENTIONS  - Assess for signs and symptoms of bleeding or hemorrhage  - Monitor labs  - Administer supportive blood products/factors as ordered and appropriate  Outcome: Progressing     Problem: MUSCULOSKELETAL - ADULT  Goal: Maintain or return mobility to safest level of function  Description: INTERVENTIONS:  - Assess patient's ability to carry out ADLs; assess patient's baseline for ADL function and identify physical deficits which impact ability to perform ADLs (bathing, care of mouth/teeth, toileting, grooming, dressing, etc.)  - Assess/evaluate cause of self-care deficits   - Assess range of motion  - Assess patient's mobility  - Assess patient's need for assistive devices and provide as appropriate  - Encourage maximum independence but intervene and supervise when necessary  - Involve family in performance of ADLs  - Assess for home care needs following discharge   - Consider OT consult to assist with ADL evaluation and planning for discharge  - Provide patient education as appropriate  Outcome: Progressing  Goal: Maintain proper alignment of affected body part  Description: INTERVENTIONS:  - Support, maintain and protect limb and body alignment  - Provide patient/ family with appropriate education  Outcome: Progressing     Problem: Prexisting or High Potential for Compromised Skin Integrity  Goal: Skin integrity is maintained or improved  Description:  INTERVENTIONS:  - Identify patients at risk for skin breakdown  - Assess and monitor skin integrity  - Assess and monitor nutrition and hydration status  - Monitor labs   - Assess for incontinence   - Turn and reposition patient  - Assist with mobility/ambulation  - Relieve pressure over bony prominences  - Avoid friction and shearing  - Provide appropriate hygiene as needed including keeping skin clean and dry  - Evaluate need for skin moisturizer/barrier cream  - Collaborate with interdisciplinary team   - Patient/family teaching  - Consider wound care consult   Outcome: Progressing

## 2024-07-20 NOTE — PROGRESS NOTES
[facility]  Progress Note  Name: Anamaria Parnell I  MRN: [id number]  Unit/Bed#: E5 -01 I Date of Admission: 7/19/2024   Date of Service: 7/20/2024 I Hospital Day: 1    Assessment & Plan   * Gangrene (HCC)  Assessment & Plan  79 yoF former smoker and vasculopath with DM II,CAD,PAD with chronic right foot wounds x months and eventually developed right great toe gangrene.     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  Patient will require extensive revascularization.  Tentative procedure on 7/24/2024  Cardiology consulted for preop clearance.  Recommend echocardiogram  Podiatry on board.    Cellulitis of toe of right foot  Assessment & Plan  Patient presenting with right big toe gangrene and associated cellulitis  Patient remains afebrile, nontoxic  Continue IV Ancef for now    Primary hypertension  Assessment & Plan  BP stable  Currently on clonidine, amlodipine, labetalol    Aortoiliac occlusive disease (HCC)  Assessment & Plan  PAD with occlusion with multiple vessels  Vascular surgery on board -plan for extensive revascularization  Continue aspirin/ brillinta / statin for now    Type 2 diabetes mellitus, without long-term current use of insulin (HCC)  Assessment & Plan  Lab Results   Component Value Date    HGBA1C 5.9 (H) 07/09/2024       Recent Labs     07/19/24  1738 07/19/24  2018 07/20/24  0733 07/20/24  1117   POCGLU 127 109 104 135         Blood Sugar Average: Last 72 hrs:  (P) 118.75  Hemoglobin A1c indicates good control   Hold home Tradjenta while inpatient  Continue diabetic diet, sliding scale coverage with ACHS checks  Hypoglycemia protocol.      Stage 4 chronic kidney disease (HCC)  Assessment & Plan  Lab Results   Component Value Date    EGFR 33 07/20/2024    EGFR 32 07/19/2024    EGFR 32 07/18/2024    CREATININE 1.49 (H) 07/20/2024    CREATININE 1.53 (H) 07/19/2024    CREATININE  1.53 (H) 2024     Baseline creatinine 1.2-1.8, currently around baseline  Initially had RICARDO on CKD at the time of admission at [facility]  Avoid nephrotoxic agent, hypotension    CVA (cerebral vascular accident) (HCC)  Assessment & Plan  History of CVA  Continue aspirin, statin    Basilar artery stenosis  Assessment & Plan  history of carotid surgery.    Follows with Dr. aGllo         VTE Pharmacologic Prophylaxis:   Pharmacologic: Heparin  Mechanical VTE Prophylaxis in Place: Yes    Patient Centered Rounds: I have performed bedside rounds with nursing staff today.    Discussions with Specialists or Other Care Team Provider: Discussed with RN    Education and Discussions with Family / Patient: Discussed with patient    Time Spent for Care:  35 minutes .  This time was spent on one or more of the following: performing physical exam; counseling and coordination of care; obtaining or reviewing history; documenting in the medical record; reviewing/ordering tests, medications or procedures; communicating with other healthcare professionals and discussing with patient's family/caregivers.    Current Length of Stay: 1 day(s)    Current Patient Status: Inpatient   Certification Statement: The patient will continue to require additional inpatient hospital stay due to PAD requiring revascularization    Discharge Plan / Estimated Discharge Date: Pending clinical course      Code Status: Level 3 - DNAR and DNI      Subjective:   Patient was seen and examined at bedside. The patient denies any fever, chills, right lower extremity pain, chest pain, palpitation, shortness of breath, N/V, abd pain.      Objective:     Vitals:   Temp (24hrs), Av °F (36.7 °C), Min:98 °F (36.7 °C), Max:98 °F (36.7 °C)    Temp:  [98 °F (36.7 °C)] 98 °F (36.7 °C)  HR:  [72-88] 81  Resp:  [16-20] 16  BP: (113-149)/(47-74) 149/74  SpO2:  [89 %-98 %] 97 %  Body mass index is 30.1 kg/m².     Input and Output Summary (last 24 hours):        Intake/Output Summary (Last 24 hours) at 7/20/2024 1317  Last data filed at 7/20/2024 1251  Gross per 24 hour   Intake 360 ml   Output 550 ml   Net -190 ml       Physical Exam:     Physical Exam  General: no acute distress  HEENT: NC/AT, PERRL, EOM - normal  Neck: Supple  Pulm/Chest: Normal chest wall expansion, clear breath sounds on both side  CVS: normal S1&S2, capillary refill <2s  Abd: soft, non tender, non distended, bowel sounds +  MSK: move all 4 extremities spontaneously, right big toe gangrene  Skin: warm  CNS: no acute focal neuro deficit      Additional Data:     Labs:    Results from last 7 days   Lab Units 07/20/24  0551   WBC Thousand/uL 11.18*   HEMOGLOBIN g/dL 9.9*   HEMATOCRIT % 30.4*   PLATELETS Thousands/uL 343   SEGS PCT % 79*   LYMPHO PCT % 8*   MONO PCT % 8   EOS PCT % 3     Results from last 7 days   Lab Units 07/20/24  0551 07/17/24  0512 07/16/24  1738   POTASSIUM mmol/L 3.9   < > 4.4   CHLORIDE mmol/L 108   < > 104   CO2 mmol/L 19*   < > 21   BUN mg/dL 19   < > 35*   CREATININE mg/dL 1.49*   < > 2.19*   CALCIUM mg/dL 8.0*   < > 8.8   ALK PHOS U/L  --   --  96   ALT U/L  --   --  4*   AST U/L  --   --  7*    < > = values in this interval not displayed.     Results from last 7 days   Lab Units 07/16/24  1738   INR  1.17       * I Have Reviewed All Lab Data Listed Above.  * Additional Pertinent Lab Tests Reviewed: All Labs For Current Hospital Admission Reviewed    Imaging:      I have reviewed pertinent imaging.      Recent Cultures (last 7 days):     Results from last 7 days   Lab Units 07/17/24  0220 07/17/24  0156   BLOOD CULTURE   --  No Growth at 72 hrs.   URINE CULTURE  >100,000 cfu/ml - Lactobacillus gasseri Lactobacillus species*  <10,000 cfu/ml Micrococcus luteus*  <10,000 cfu/ml - Lactobacillus rhamnosus Lactobacillus species*  --        Last 24 Hours Medication List:   Current Facility-Administered Medications   Medication Dose Route Frequency Provider Last Rate     acetaminophen  975 mg Oral Q8H [facility] Kimberly Geiger MD      allopurinol  100 mg Oral Daily Kimberly Geiger MD      amLODIPine  5 mg Oral Daily GRACIELA Alvares      aspirin  81 mg Oral Daily Kimberly Geiger MD      cefazolin  1,000 mg Intravenous Q12H Kimberly Geiger MD 1,000 mg (07/20/24 0821)    cinacalcet  30 mg Oral Every Other Day Kimberly Geiger MD      [START ON 7/23/2024] cloNIDine  1 patch Transdermal Weekly Kimberly Geiger MD      escitalopram  20 mg Oral Daily Kimberly Geiger MD      heparin (porcine)  5,000 Units Subcutaneous Q8H [facility] Kimberly Geiger MD      HYDROmorphone  0.2 mg Intravenous Q6H PRN Kimberly Geiger MD      insulin lispro  1-5 Units Subcutaneous TID AC Kimberly Geiger MD      labetalol  300 mg Oral Q12H [facility] Kimberly Geiger MD      lidocaine  1 patch Topical Daily Kimberly Geiger MD      ondansetron  4 mg Intravenous Q6H PRN Kimberly Geiger MD      oxyCODONE  5 mg Oral Q6H PRN Kimberly Geiger MD      pravastatin  80 mg Oral Daily With Dinner Kimberly Geiger MD      sodium bicarbonate  650 mg Oral BID after meals GRACIELA Alvares      ticagrelor  90 mg Oral Q12H [facility] Kimberly Geiger MD      traZODone  50 mg Oral HS Kimberly Geiger MD          Today, Patient Was Seen By: Kimberly Geiger MD    ** Please Note: Dragon 360 Dictation voice to text software may have been used in the creation of this document. **

## 2024-07-20 NOTE — ASSESSMENT & PLAN NOTE
- preoperative risk assessment for R femoral endarterectomy with retrograde stenting and left leg bypass tentative date 07/24/24  - chronic right hallex dry gangrene, vascular following    Pt has an extensive cardiac and vascular history. She can currently perform 2-3 METS due to pain and is being mostly in a wheelchair. Last echo in August 2023 showed normal LVEF with a small pericardial effusion anterior to the heart. No echo to f/u. She does not endorse anginal chest pain today. She appears peripherally euvolemic, although I do hear some trace rales in the bases of her lungs. Would consider her high risk due to her cardiovascular history and other comorbidites although the benefits out weight the risks of the surgery. Patient understands she is considered high risk.  - will order a repeat echo to assess function and to make sure effusion is gone

## 2024-07-20 NOTE — ASSESSMENT & PLAN NOTE
Lab Results   Component Value Date    EGFR 33 07/20/2024    EGFR 32 07/19/2024    EGFR 32 07/18/2024    CREATININE 1.49 (H) 07/20/2024    CREATININE 1.53 (H) 07/19/2024    CREATININE 1.53 (H) 07/18/2024     Baseline creatinine 1.2-1.8, currently around baseline  Initially had RICARDO on CKD at the time of admission at [facility]  Avoid nephrotoxic agent, hypotension

## 2024-07-20 NOTE — ASSESSMENT & PLAN NOTE
Chronic limb threatening ischemia with right foot pain or tissue loss  Aortoiliac occlusive disease  Peripheral arterial disease  Gangrene of right great toe    79 yoF former smoker, obesity, CKD IV, DM II, HLD, HTN, CAD, L MCA and PCA CVA s/p L TCAR 9/7/23 ([place]), renal artery stenosis, mesenteric artery stenosis, aortoiliac occlusive disease/PAD with chronic right hallex dry gangrene. Patient admitted to [facility] on 7/16/2024 with worsening right great toe pain with gangrene and foot pain. Patient follows with vascular surgery for PAD and chronic limb ischemia. Vascular surgery consulted to evaluate peripheral perfusion and possible revascularization options.     Imaging shows extensive AIOD and peripheral arterial disease with occlusion of the R iliac system, nearly occlusive R CFA, occlusion of popliteal artery with recon of the below knee popliteal and tibials. LISA is very low (0.1) and flatline metatarsal and toe pressures. Patient transferred to [facility] for plan of R femoral endarterectomy with retrograde stenting and left leg bypass which may have to be staged surgeries based on her comorbid conditions    Imaging:  CTA abdomen with runoff: official report P    TEA 7/16/24:    R 0.11/--/--; Severe fem-pop disease c HG stenosis/occlusion prox-distal SFA c recon popliteal. Tib/peroneal occlusive disease. Distal YOVANY occlusion.    L 0.31/20/19; Severe diffuse fem-pop disease with HG v occlusion prox-mid SFA with recon distal SFA. Tib/Peronea occlusive disease. Entire PT artery high grade stenosis v occlusion.   Lower extremity vein mapping 7/18/2024:    R GSV patent groin to ankle 2.2 -6.4mm; L GSV patent groin to ankle 1.3 -6.8mm    A1c 5.9  WBC 11.18 HGB 9.9   BUN/creat 19/1.49  Blood cultures - no growth x 48 hrs    Plan:   -CLTI with advanced calcific atherosclerotic occlusive disease, R hallux dry gangrene and foot pain. Recurrent R foot cellulitis. Afebrile, non-toxic   -CTA a/p with runoff demonstrates  occlusion of the R iliac system, nearly occlusive R CFA, occlusion of popliteal artery with recon of the below knee popliteal and tibials.   -LISA is very low (0.1) and flatline metatarsal and toe pressures.  -Plan for R CFA endarterectomy with retrograde right EIA stenting, as well as right femoral to tibial bypass. Patient agrees to proceed. Surgery planned for 7/24.  -Cardiology consulted for preoperative risk stratification  -Antibiotics; trend WBC and follow-up blood cultures as per SLIM  -Continue medical optimization with aspirin and ticagrelor  -Pain and medical management as per SLIM  -Local wound care and plan for R great toe - consult podiatry  -D/w Dr. Alma Schuler

## 2024-07-20 NOTE — PLAN OF CARE
Problem: PAIN - ADULT  Goal: Verbalizes/displays adequate comfort level or baseline comfort level  Description: Interventions:  - Encourage patient to monitor pain and request assistance  - Assess pain using appropriate pain scale  - Administer analgesics based on type and severity of pain and evaluate response  - Implement non-pharmacological measures as appropriate and evaluate response  - Consider cultural and social influences on pain and pain management  - Notify physician/advanced practitioner if interventions unsuccessful or patient reports new pain  Outcome: Progressing     Problem: INFECTION - ADULT  Goal: Absence or prevention of progression during hospitalization  Description: INTERVENTIONS:  - Assess and monitor for signs and symptoms of infection  - Monitor lab/diagnostic results  - Monitor all insertion sites, i.e. indwelling lines, tubes, and drains  - Monitor endotracheal if appropriate and nasal secretions for changes in amount and color  - Festus appropriate cooling/warming therapies per order  - Administer medications as ordered  - Instruct and encourage patient and family to use good hand hygiene technique  - Identify and instruct in appropriate isolation precautions for identified infection/condition  Outcome: Progressing     Problem: SAFETY ADULT  Goal: Patient will remain free of falls  Description: INTERVENTIONS:  - Educate patient/family on patient safety including physical limitations  - Instruct patient to call for assistance with activity   - Consult OT/PT to assist with strengthening/mobility   - Keep Call bell within reach  - Keep bed low and locked with side rails adjusted as appropriate  - Keep care items and personal belongings within reach  - Initiate and maintain comfort rounds  - Make Fall Risk Sign visible to staff  - Offer Toileting every 2 Hours, in advance of need  - Initiate/Maintain bed alarm  - Apply yellow socks and bracelet for high fall risk patients  - Consider  moving patient to room near nurses station  Outcome: Progressing  Goal: Maintain or return to baseline ADL function  Description: INTERVENTIONS:  -  Assess patient's ability to carry out ADLs; assess patient's baseline for ADL function and identify physical deficits which impact ability to perform ADLs (bathing, care of mouth/teeth, toileting, grooming, dressing, etc.)  - Assess/evaluate cause of self-care deficits   - Assess range of motion  - Assess patient's mobility; develop plan if impaired  - Assess patient's need for assistive devices and provide as appropriate  - Encourage maximum independence but intervene and supervise when necessary  - Involve family in performance of ADLs  - Assess for home care needs following discharge   - Consider OT consult to assist with ADL evaluation and planning for discharge  - Provide patient education as appropriate  Outcome: Progressing  Goal: Maintains/Returns to pre admission functional level  Description: INTERVENTIONS:  - Perform AM-PAC 6 Click Basic Mobility/ Daily Activity assessment daily.  - Set and communicate daily mobility goal to care team and patient/family/caregiver.   - Collaborate with rehabilitation services on mobility goals if consulted  - Stand patient 3 times a day  - Ambulate patient 3 times a day  - Out of bed to chair 3 times a day   - Out of bed for meals 3 times a day  - Out of bed for toileting  - Record patient progress and toleration of activity level   Outcome: Progressing     Problem: DISCHARGE PLANNING  Goal: Discharge to home or other facility with appropriate resources  Description: INTERVENTIONS:  - Identify barriers to discharge w/patient and caregiver  - Arrange for needed discharge resources and transportation as appropriate  - Identify discharge learning needs (meds, wound care, etc.)  - Arrange for interpretive services to assist at discharge as needed  - Refer to Case Management Department for coordinating discharge planning if the  patient needs post-hospital services based on physician/advanced practitioner order or complex needs related to functional status, cognitive ability, or social support system  Outcome: Progressing     Problem: Knowledge Deficit  Goal: Patient/family/caregiver demonstrates understanding of disease process, treatment plan, medications, and discharge instructions  Description: Complete learning assessment and assess knowledge base.  Interventions:  - Provide teaching at level of understanding  - Provide teaching via preferred learning methods  Outcome: Progressing     Problem: CARDIOVASCULAR - ADULT  Goal: Maintains optimal cardiac output and hemodynamic stability  Description: INTERVENTIONS:  - Monitor I/O, vital signs and rhythm  - Monitor for S/S and trends of decreased cardiac output  - Administer and titrate ordered vasoactive medications to optimize hemodynamic stability  - Assess quality of pulses, skin color and temperature  - Assess for signs of decreased coronary artery perfusion  - Instruct patient to report change in severity of symptoms  Outcome: Progressing     Problem: METABOLIC, FLUID AND ELECTROLYTES - ADULT  Goal: Electrolytes maintained within normal limits  Description: INTERVENTIONS:  - Monitor labs and assess patient for signs and symptoms of electrolyte imbalances  - Administer electrolyte replacement as ordered  - Monitor response to electrolyte replacements, including repeat lab results as appropriate  - Instruct patient on fluid and nutrition as appropriate  Outcome: Progressing  Goal: Fluid balance maintained  Description: INTERVENTIONS:  - Monitor labs   - Monitor I/O and WT  - Instruct patient on fluid and nutrition as appropriate  - Assess for signs & symptoms of volume excess or deficit  Outcome: Progressing  Goal: Glucose maintained within target range  Description: INTERVENTIONS:  - Monitor Blood Glucose as ordered  - Assess for signs and symptoms of hyperglycemia and hypoglycemia  -  Administer ordered medications to maintain glucose within target range  - Assess nutritional intake and initiate nutrition service referral as needed  Outcome: Progressing     Problem: HEMATOLOGIC - ADULT  Goal: Maintains hematologic stability  Description: INTERVENTIONS  - Assess for signs and symptoms of bleeding or hemorrhage  - Monitor labs  - Administer supportive blood products/factors as ordered and appropriate  Outcome: Progressing     Problem: MUSCULOSKELETAL - ADULT  Goal: Maintain or return mobility to safest level of function  Description: INTERVENTIONS:  - Assess patient's ability to carry out ADLs; assess patient's baseline for ADL function and identify physical deficits which impact ability to perform ADLs (bathing, care of mouth/teeth, toileting, grooming, dressing, etc.)  - Assess/evaluate cause of self-care deficits   - Assess range of motion  - Assess patient's mobility  - Assess patient's need for assistive devices and provide as appropriate  - Encourage maximum independence but intervene and supervise when necessary  - Involve family in performance of ADLs  - Assess for home care needs following discharge   - Consider OT consult to assist with ADL evaluation and planning for discharge  - Provide patient education as appropriate  Outcome: Progressing  Goal: Maintain proper alignment of affected body part  Description: INTERVENTIONS:  - Support, maintain and protect limb and body alignment  - Provide patient/ family with appropriate education  Outcome: Progressing     Problem: Prexisting or High Potential for Compromised Skin Integrity  Goal: Skin integrity is maintained or improved  Description: INTERVENTIONS:  - Identify patients at risk for skin breakdown  - Assess and monitor skin integrity  - Assess and monitor nutrition and hydration status  - Monitor labs   - Assess for incontinence   - Turn and reposition patient  - Assist with mobility/ambulation  - Relieve pressure over bony  prominences  - Avoid friction and shearing  - Provide appropriate hygiene as needed including keeping skin clean and dry  - Evaluate need for skin moisturizer/barrier cream  - Collaborate with interdisciplinary team   - Patient/family teaching  - Consider wound care consult   Outcome: Progressing

## 2024-07-20 NOTE — ASSESSMENT & PLAN NOTE
Lab Results   Component Value Date    HGBA1C 5.9 (H) 07/09/2024       Recent Labs     07/19/24  1738 07/19/24 2018 07/20/24  0733 07/20/24  1117   POCGLU 127 109 104 135         Blood Sugar Average: Last 72 hrs:  (P) 118.75  Hemoglobin A1c indicates good control   Hold home Tradjenta while inpatient  Continue diabetic diet, sliding scale coverage with ACHS checks  Hypoglycemia protocol.

## 2024-07-20 NOTE — ASSESSMENT & PLAN NOTE
- NM stress test 08/18/23: no evidence of stress induced myocardial ischemia. Left ventricular perfusion is normal.     - cardiac cath 02/26/20: mLAD 50-60% stenosis, D2 small vessel with proximal 90% stenosis, moderate atherosclerotic of Lcx, proximal RCA chronic total occlusion with left to right  and right to right collateral (Tx medically)  - DAPT: ASA 81 mg daily and Brilinta 90 mg BID  - statin: pravastatin 80 mg daily   - betablocker: labetalol 300 mg BID    Denies any anginal chest pain.

## 2024-07-20 NOTE — ASSESSMENT & PLAN NOTE
- BP controlled  - home hypertensive regimen: labetolol 300 mg BID, amlodipine 5 mg daily, clonidine 0.3 mg/24h, losartan 100 mg daily

## 2024-07-20 NOTE — ASSESSMENT & PLAN NOTE
CTA a/p with runoff demonstrates occlusion of the R iliac system, nearly occlusive R CFA, occlusion of popliteal artery with recon of the below knee popliteal and tibials.

## 2024-07-20 NOTE — PROGRESS NOTES
[facility]  Progress Note  Name: Anamaria Parnell I  MRN: [id number]  Unit/Bed#: E5 -01 I Date of Admission: 7/19/2024   Date of Service: 7/20/2024 I Hospital Day: 1    Assessment & Plan   * Gangrene (HCC)  Assessment & Plan  Chronic limb threatening ischemia with right foot pain or tissue loss  Aortoiliac occlusive disease  Peripheral arterial disease  Gangrene of right great toe    79 yoF former smoker, obesity, CKD IV, DM II, HLD, HTN, CAD, L MCA and PCA CVA s/p L TCAR 9/7/23 ([place]), renal artery stenosis, mesenteric artery stenosis, aortoiliac occlusive disease/PAD with chronic right hallex dry gangrene. Patient admitted to [facility] on 7/16/2024 with worsening right great toe pain with gangrene and foot pain. Patient follows with vascular surgery for PAD and chronic limb ischemia. Vascular surgery consulted to evaluate peripheral perfusion and possible revascularization options.     Imaging shows extensive AIOD and peripheral arterial disease with occlusion of the R iliac system, nearly occlusive R CFA, occlusion of popliteal artery with recon of the below knee popliteal and tibials. LISA is very low (0.1) and flatline metatarsal and toe pressures. Patient transferred to [facility] for plan of R femoral endarterectomy with retrograde stenting and left leg bypass which may have to be staged surgeries based on her comorbid conditions    Imaging:  CTA abdomen with runoff: official report P    TEA 7/16/24:    R 0.11/--/--; Severe fem-pop disease c HG stenosis/occlusion prox-distal SFA c recon popliteal. Tib/peroneal occlusive disease. Distal YOVANY occlusion.    L 0.31/20/19; Severe diffuse fem-pop disease with HG v occlusion prox-mid SFA with recon distal SFA. Tib/Peronea occlusive disease. Entire PT artery high grade stenosis v occlusion.   Lower extremity vein mapping 7/18/2024:    R GSV patent groin to ankle 2.2 -6.4mm; L GSV patent groin to ankle 1.3 -6.8mm    A1c 5.9  WBC 11.18 HGB 9.9 PLT  "343  BUN/creat 19/1.49  Blood cultures - no growth x 48 hrs    Plan:   -CLTI with advanced calcific atherosclerotic occlusive disease, R hallux dry gangrene and foot pain. Recurrent R foot cellulitis. Afebrile, non-toxic   -CTA a/p with runoff demonstrates occlusion of the R iliac system, nearly occlusive R CFA, occlusion of popliteal artery with recon of the below knee popliteal and tibials.   -LISA is very low (0.1) and flatline metatarsal and toe pressures.  -Plan for R CFA endarterectomy with retrograde right EIA stenting, as well as right femoral to tibial bypass. Patient agrees to proceed. Surgery planned for 7/24.  -Cardiology consulted for preoperative risk stratification  -Antibiotics; trend WBC and follow-up blood cultures as per SLIM  -Continue medical optimization with aspirin and ticagrelor  -Pain and medical management as per SLIM  -Local wound care and plan for R great toe - consult podiatry  -D/w Dr. Marquez Doctor           Subjective:  Patient reports chronic orthopnea. She normally sleeps in a recliner but slept in bed for which she developed some shortness of breath when she woke up.  She tells me this is not new.  No chest pain.  Continued right great toe/foot pain. No fevers or chills.      Vitals:  /74 (BP Location: Right arm)   Pulse 81   Temp 98 °F (36.7 °C) (Oral)   Resp 16   Ht 4' 10\" (1.473 m)   Wt 65.3 kg (144 lb)   LMP  (LMP Unknown)   SpO2 97%   BMI 30.10 kg/m²     I/Os:  I/O last 3 completed shifts:  In: -   Out: 200 [Urine:200]  No intake/output data recorded.    Lab Results and Cultures:   Lab Results   Component Value Date    WBC 11.18 (H) 07/20/2024    HGB 9.9 (L) 07/20/2024    HCT 30.4 (L) 07/20/2024    MCV 89 07/20/2024     07/20/2024     Lab Results   Component Value Date    GLUCOSE 130 09/07/2023    CALCIUM 8.0 (L) 07/20/2024    K 3.9 07/20/2024    CO2 19 (L) 07/20/2024     07/20/2024    BUN 19 07/20/2024    CREATININE 1.49 (H) 07/20/2024     Lab " "Results   Component Value Date    INR 1.17 07/16/2024    INR 0.92 04/18/2024    INR 0.96 09/08/2023    PROTIME 15.6 (H) 07/16/2024    PROTIME 12.9 04/18/2024    PROTIME 13.0 09/08/2023        Blood Culture:   Lab Results   Component Value Date    BLOODCX No Growth at 48 hrs. 07/17/2024   ,   Urinalysis:   Lab Results   Component Value Date    COLORU Light Yellow 07/17/2024    CLARITYU Clear 07/17/2024    SPECGRAV 1.016 07/17/2024    PHUR 5.5 07/17/2024    LEUKOCYTESUR Moderate (A) 07/17/2024    NITRITE Negative 07/17/2024    GLUCOSEU Negative 07/17/2024    KETONESU Negative 07/17/2024    BILIRUBINUR Negative 07/17/2024    BLOODU Negative 07/17/2024   ,   Urine Culture:   Lab Results   Component Value Date    URINECX (A) 07/17/2024     >100,000 cfu/ml - Lactobacillus gasseri Lactobacillus species    URINECX <10,000 cfu/ml Micrococcus luteus (A) 07/17/2024    URINECX (A) 07/17/2024     <10,000 cfu/ml - Lactobacillus rhamnosus Lactobacillus species   ,   Wound Culure: No results found for: \"WOUNDCULT\"    Medications:  Current Facility-Administered Medications   Medication Dose Route Frequency    acetaminophen (TYLENOL) tablet 975 mg  975 mg Oral Q8H JAVIER    allopurinol (ZYLOPRIM) tablet 100 mg  100 mg Oral Daily    amLODIPine (NORVASC) tablet 5 mg  5 mg Oral Daily    aspirin chewable tablet 81 mg  81 mg Oral Daily    ceFAZolin (ANCEF) IVPB (premix in dextrose) 1,000 mg 50 mL  1,000 mg Intravenous Q12H    cinacalcet (SENSIPAR) tablet 30 mg  30 mg Oral Every Other Day    [START ON 7/23/2024] cloNIDine (CATAPRES-TTS-3) 0.3 mg/24 hr TD weekly patch  1 patch Transdermal Weekly    escitalopram (LEXAPRO) tablet 20 mg  20 mg Oral Daily    heparin (porcine) subcutaneous injection 5,000 Units  5,000 Units Subcutaneous Q8H JAVIER    HYDROmorphone HCl (DILAUDID) injection 0.2 mg  0.2 mg Intravenous Q6H PRN    insulin lispro (HumALOG/ADMELOG) 100 units/mL subcutaneous injection 1-5 Units  1-5 Units Subcutaneous TID AC    labetalol " (NORMODYNE) tablet 300 mg  300 mg Oral Q12H JAVIER    lidocaine (LIDODERM) 5 % patch 1 patch  1 patch Topical Daily    losartan (COZAAR) tablet 100 mg  100 mg Oral Daily    ondansetron (ZOFRAN) injection 4 mg  4 mg Intravenous Q6H PRN    oxyCODONE (ROXICODONE) IR tablet 5 mg  5 mg Oral Q6H PRN    pravastatin (PRAVACHOL) tablet 80 mg  80 mg Oral Daily With Dinner    ticagrelor (BRILINTA) tablet 90 mg  90 mg Oral Q12H JAVIER    traZODone (DESYREL) tablet 50 mg  50 mg Oral HS       Imaging:  CTA a/p with runoff - report pending    Physical Exam:    General appearance: alert and oriented, in no acute distress  Skin: Skin color, texture, turgor normal. No rashes or lesions  Neurologic: Grossly normal  Head: Normocephalic, without obvious abnormality, atraumatic  Eyes: non-icteric  Lungs: overall CTAB  Chest wall: no tenderness  Heart: RRR S1S2  Abdomen: soft, non-tender; bowel sounds normal; no masses,  no organomegaly  Extremities: R great toe gangrene.  Trace R foot edema. Mild, flat erythema to the right forefoot.  Overall moves toes.  No pain elicited on palpation of R foot. No pulses in feet.             Ashly Marion PA-C  7/20/2024  [facility]

## 2024-07-20 NOTE — ASSESSMENT & PLAN NOTE
79 yoF former smoker and vasculopath with DM II,CAD,PAD with chronic right foot wounds x months and eventually developed right great toe gangrene.     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  Patient will require extensive revascularization.  Tentative procedure on 7/24/2024  Cardiology consulted for preop clearance.  Recommend echocardiogram  Podiatry on board.

## 2024-07-20 NOTE — OB LABOR/OXYTOCIN SAFETY PROGRESS
NEPHROLOGY PROGRESS NOTE   Anamaria Parnell 79 y.o. female MRN: [id number]  Unit/Bed#: E5 -01 Encounter: [id number]  Reason for Consult: RICARDO(POA) on CKD IIIB/IV    79-year-old female with history of CKD stage IIIb/IV, hypertension, diabetes, hyperlipidemia, renal artery stenosis, smoker, who originally presented to [facility] due to right big toe pain.  Transferred to [facility] for vascular surgery consult for revascularization.    ASSESSMENT/PLAN:  RICARDO on CKD stage IIIB/IV: Suspected secondary to sepsis with soft tissue infection.  -Baseline creatinine 1.4-1.7.  -Originally presented with creatinine 2.1.  -Creatinine is currently at baseline.  -Follows with Dr. NAYE Longoria.  -S/P IV contrast 7/18/2024.  -Most recent renal ultrasound with normal echogenicity and contour of the right kidney, atrophic left kidney.  -UA with +1 protein, 2-4 RBCs, 20-30 WBCs.  -Urine albumin to creatinine ratio 1.0 g.  -Recommend holding losartan the day of endarterectomy.  Recommend pre and post procedural hydration.  Minimize IV contrast load.  -Nephrology will monitor peripherally over the weekend.    Low bicarbonate level: in the setting of CKD.   -will start on oral bicarbonate and continue to monitor.     Hypertension: Blood pressure stable.  History of renal artery stenosis.  -Current medication: Amlodipine 5 mg daily, clonidine 0.3 mg per 24-hour patch weekly, labetalol 300 mg every 12 hours, losartan 100 mg daily.  -Home medication: Labetalol 300 mg every 12 hours, losartan 100 mg daily, amlodipine 5 mg daily, clonidine transdermal patch 0.3 mg weekly.  -Avoid hypotension or high fluctuation in blood pressure.  -Holding parameters adjusted on antihypertensive for systolic blood pressure less than 130.    Secondary hyperparathyroidism:  -Continue Sensipar 30 mg every other day.    Right hallux gangrene: Presenting with worsening right great toe pain.  -Vascular surgery team following.  Planning on right femoral endarterectomy  07/24.  -Currently on antibiotics.  -Continue local wound care.  -Please see recommendations under RICARDO in regards to dye preporation.     Anemia:  -Avoid IV Venofer in the setting of infection.  -checking iron panel.   -Previous SPEP/UPEP negative for monoclonal gammopathy.  -Poor candidate for DIO with history of CVA and bladder cancer.  -Continue to monitor and transfuse as needed for hemoglobin less than 7.0.    Other: Diabetes, hyperlipidemia, CAD, CVA, bladder cancer.    Disposition: Requiring additional stay due to medical needs.    SUBJECTIVE:  Denies chest pain or shortness of breath.  Reports intermittent right toe/foot pain.  Denies nausea, vomiting, diarrhea.  Reports overall decreased appetite.  Denies issues with urination.    OBJECTIVE:  Current Weight: Weight - Scale: 65.3 kg (144 lb)  Vitals:    07/19/24 2331 07/20/24 0500 07/20/24 0659 07/20/24 0734   BP: (!) 131/47   149/74   BP Location: Right arm   Right arm   Pulse: 72   81   Resp: 18   16   Temp: 98 °F (36.7 °C)   98 °F (36.7 °C)   TempSrc: Oral   Oral   SpO2: 93% (!) 89% 94% 97%   Weight:       Height:           Intake/Output Summary (Last 24 hours) at 7/20/2024 0757  Last data filed at 7/19/2024 1715  Gross per 24 hour   Intake --   Output 200 ml   Net -200 ml     General: NAD  Skin: warm, dry  HEENT: Moist mucous membranes, sclera anicteric, normocephalic, atraumatic  Neck: No apparent JVD appreciated  Chest: lung sounds clear B/L  CVS: Regular rate and rhythm  Abdomen: Soft, round, non-tender, +BS  Extremities: No B/L LE edema present, right hallux gangrene  Neuro: alert and oriented  Psych: appropriate mood and affect     Medications:    Current Facility-Administered Medications:     acetaminophen (TYLENOL) tablet 975 mg, 975 mg, Oral, Q8H [facility], Kimberly Geiger MD, 975 mg at 07/20/24 0659    allopurinol (ZYLOPRIM) tablet 100 mg, 100 mg, Oral, Daily, Kimberly Geiger MD    amLODIPine (NORVASC) tablet 5 mg, 5 mg, Oral, Daily, Kimberly Geiger MD    aspirin  chewable tablet 81 mg, 81 mg, Oral, Daily, Kimberly Geiger MD    ceFAZolin (ANCEF) IVPB (premix in dextrose) 1,000 mg 50 mL, 1,000 mg, Intravenous, Q12H, Kimberly Geiger MD, Last Rate: 100 mL/hr at 07/19/24 2250, 1,000 mg at 07/19/24 2250    cinacalcet (SENSIPAR) tablet 30 mg, 30 mg, Oral, Every Other Day, Kimberly Geiger MD    [START ON 7/23/2024] cloNIDine (CATAPRES-TTS-3) 0.3 mg/24 hr TD weekly patch, 1 patch, Transdermal, Weekly, Kimberly Geiger MD    escitalopram (LEXAPRO) tablet 20 mg, 20 mg, Oral, Daily, Kimberly Geiger MD    heparin (porcine) subcutaneous injection 5,000 Units, 5,000 Units, Subcutaneous, Q8H JAVIER, Kimberly Geiger MD, 5,000 Units at 07/20/24 0659    HYDROmorphone HCl (DILAUDID) injection 0.2 mg, 0.2 mg, Intravenous, Q6H PRN, Kimberly Geiger MD    insulin lispro (HumALOG/ADMELOG) 100 units/mL subcutaneous injection 1-5 Units, 1-5 Units, Subcutaneous, TID AC **AND** Fingerstick Glucose (POCT), , , TID AC, Kimberly Geiger MD    labetalol (NORMODYNE) tablet 300 mg, 300 mg, Oral, Q12H JAVIER, Kimberly Geiger MD, 300 mg at 07/19/24 2213    lidocaine (LIDODERM) 5 % patch 1 patch, 1 patch, Topical, Daily, Kimberly Geiger MD    losartan (COZAAR) tablet 100 mg, 100 mg, Oral, Daily, Kimberly Geiger MD    ondansetron (ZOFRAN) injection 4 mg, 4 mg, Intravenous, Q6H PRN, Kimberly Geiger MD    oxyCODONE (ROXICODONE) IR tablet 5 mg, 5 mg, Oral, Q6H PRN, Kimberly Geiger MD, 5 mg at 07/19/24 2213    pravastatin (PRAVACHOL) tablet 80 mg, 80 mg, Oral, Daily With Dinner, Kimberly Geiger MD, 80 mg at 07/19/24 1809    ticagrelor (BRILINTA) tablet 90 mg, 90 mg, Oral, Q12H JAVIER, Kimberly Geiger MD, 90 mg at 07/19/24 2225    traZODone (DESYREL) tablet 50 mg, 50 mg, Oral, HS, Kimberly Geiger MD, 50 mg at 07/19/24 2213    Laboratory Results:  Results from last 7 days   Lab Units 07/20/24  0551 07/19/24  0518 07/18/24  0537 07/17/24  0156 07/16/24  1738   WBC Thousand/uL 11.18* 13.35* 12.31*   < > 14.08*   HEMOGLOBIN g/dL 9.9* 10.8* 10.4*   < > 11.1*   HEMATOCRIT % 30.4* 33.5* 32.1*   < > 33.5*    PLATELETS Thousands/uL 343 357 333   < > 397*   SODIUM mmol/L 139 140 140   < > 140   POTASSIUM mmol/L 3.9 3.8 3.9   < > 4.4   CHLORIDE mmol/L 108 108 109*   < > 104   CO2 mmol/L 19* 20* 21   < > 21   BUN mg/dL 19 20 22   < > 35*   CREATININE mg/dL 1.49* 1.53* 1.53*   < > 2.19*   CALCIUM mg/dL 8.0* 8.1* 8.2*   < > 8.8   ALK PHOS U/L  --   --   --   --  96   ALT U/L  --   --   --   --  4*   AST U/L  --   --   --   --  7*    < > = values in this interval not displayed.

## 2024-07-21 ENCOUNTER — APPOINTMENT (INPATIENT)
Dept: NON INVASIVE DIAGNOSTICS | Facility: HOSPITAL | Age: 79
DRG: 278 | End: 2024-07-21
Payer: COMMERCIAL

## 2024-07-21 LAB
AORTIC ROOT: 3.4 CM
AORTIC VALVE MEAN VELOCITY: 8.4 M/S
APICAL FOUR CHAMBER EJECTION FRACTION: 42 %
ASCENDING AORTA: 3.7 CM
AV AREA BY CONTINUOUS VTI: 1.7 CM2
AV AREA PEAK VELOCITY: 1.9 CM2
AV LVOT MEAN GRADIENT: 1 MMHG
AV LVOT PEAK GRADIENT: 2 MMHG
AV MEAN GRADIENT: 3 MMHG
AV PEAK GRADIENT: 5 MMHG
AV VALVE AREA: 1.71 CM2
AV VELOCITY RATIO: 0.61
BACTERIA BLD CULT: NORMAL
BSA FOR ECHO PROCEDURE: 1.58 M2
DOP CALC AO PEAK VEL: 1.14 M/S
DOP CALC AO VTI: 31.41 CM
DOP CALC LVOT AREA: 3.14 CM2
DOP CALC LVOT CARDIAC INDEX: 2.46 L/MIN/M2
DOP CALC LVOT CARDIAC OUTPUT: 3.88 L/MIN
DOP CALC LVOT DIAMETER: 2 CM
DOP CALC LVOT PEAK VEL VTI: 17.13 CM
DOP CALC LVOT PEAK VEL: 0.7 M/S
DOP CALC LVOT STROKE INDEX: 34.8 ML/M2
DOP CALC LVOT STROKE VOLUME: 53.79
DOP CALC MV VTI: 42.35 CM
E WAVE DECELERATION TIME: 214 MS
E/A RATIO: 0.94
FRACTIONAL SHORTENING: 16 (ref 28–44)
GLOBAL LONGITUIDAL STRAIN: -10 %
GLUCOSE SERPL-MCNC: 113 MG/DL (ref 65–140)
GLUCOSE SERPL-MCNC: 118 MG/DL (ref 65–140)
GLUCOSE SERPL-MCNC: 95 MG/DL (ref 65–140)
GLUCOSE SERPL-MCNC: 98 MG/DL (ref 65–140)
INTERVENTRICULAR SEPTUM IN DIASTOLE (PARASTERNAL SHORT AXIS VIEW): 1.8 CM
INTERVENTRICULAR SEPTUM: 1.8 CM (ref 0.6–1.1)
LAAS-AP2: 25.6 CM2
LAAS-AP4: 25.4 CM2
LEFT ATRIUM SIZE: 4.7 CM
LEFT ATRIUM VOLUME (MOD BIPLANE): 92 ML
LEFT ATRIUM VOLUME INDEX (MOD BIPLANE): 58.2 ML/M2
LEFT INTERNAL DIMENSION IN SYSTOLE: 3.2 CM (ref 2.1–4)
LEFT VENTRICLE DIASTOLIC VOLUME (MOD BIPLANE): 103 ML
LEFT VENTRICLE DIASTOLIC VOLUME INDEX (MOD BIPLANE): 65.2 ML/M2
LEFT VENTRICLE SYSTOLIC VOLUME (MOD BIPLANE): 61 ML
LEFT VENTRICLE SYSTOLIC VOLUME INDEX (MOD BIPLANE): 38.6 ML/M2
LEFT VENTRICULAR INTERNAL DIMENSION IN DIASTOLE: 3.8 CM (ref 3.5–6)
LEFT VENTRICULAR POSTERIOR WALL IN END DIASTOLE: 1.7 CM
LEFT VENTRICULAR STROKE VOLUME: 18 ML
LV EF: 41 %
LVSV (TEICH): 18 ML
MV E'TISSUE VEL-LAT: 7 CM/S
MV E'TISSUE VEL-SEP: 3 CM/S
MV MEAN GRADIENT: 5 MMHG
MV PEAK A VEL: 1.46 M/S
MV PEAK E VEL: 137 CM/S
MV PEAK GRADIENT: 12 MMHG
MV VALVE AREA BY CONTINUITY EQUATION: 1.27 CM2
RA PRESSURE ESTIMATED: 15 MMHG
RIGHT ATRIUM AREA SYSTOLE A4C: 14 CM2
RIGHT VENTRICLE ID DIMENSION: 3.1 CM
RV PSP: 59 MMHG
SINOTUBULAR JUNCTION: 2.7 CM
SL CV LEFT ATRIUM LENGTH A2C: 5.6 CM
SL CV LV EF: 41
SL CV PED ECHO LEFT VENTRICLE DIASTOLIC VOLUME (MOD BIPLANE) 2D: 61 ML
SL CV PED ECHO LEFT VENTRICLE SYSTOLIC VOLUME (MOD BIPLANE) 2D: 42 ML
SL CV SINUS OF VALSALVA 2D: 3.4 CM
STJ: 2.7 CM
TR MAX PG: 44 MMHG
TR PEAK VELOCITY: 3.3 M/S
TRICUSPID ANNULAR PLANE SYSTOLIC EXCURSION: 1.6 CM
TRICUSPID VALVE PEAK REGURGITATION VELOCITY: 3.33 M/S

## 2024-07-21 PROCEDURE — 93306 TTE W/DOPPLER COMPLETE: CPT

## 2024-07-21 PROCEDURE — 99232 SBSQ HOSP IP/OBS MODERATE 35: CPT | Performed by: SURGERY

## 2024-07-21 PROCEDURE — 99232 SBSQ HOSP IP/OBS MODERATE 35: CPT | Performed by: INTERNAL MEDICINE

## 2024-07-21 PROCEDURE — 82948 REAGENT STRIP/BLOOD GLUCOSE: CPT

## 2024-07-21 RX ADMIN — ALLOPURINOL 100 MG: 100 TABLET ORAL at 08:41

## 2024-07-21 RX ADMIN — CEFAZOLIN SODIUM 1000 MG: 1 SOLUTION INTRAVENOUS at 08:46

## 2024-07-21 RX ADMIN — LABETALOL HYDROCHLORIDE 300 MG: 200 TABLET, FILM COATED ORAL at 08:41

## 2024-07-21 RX ADMIN — ASPIRIN 81 MG CHEWABLE TABLET 81 MG: 81 TABLET CHEWABLE at 08:41

## 2024-07-21 RX ADMIN — LABETALOL HYDROCHLORIDE 300 MG: 200 TABLET, FILM COATED ORAL at 21:09

## 2024-07-21 RX ADMIN — TICAGRELOR 90 MG: 90 TABLET ORAL at 21:16

## 2024-07-21 RX ADMIN — OXYCODONE HYDROCHLORIDE 5 MG: 5 TABLET ORAL at 08:44

## 2024-07-21 RX ADMIN — CEFAZOLIN SODIUM 1000 MG: 1 SOLUTION INTRAVENOUS at 21:08

## 2024-07-21 RX ADMIN — ACETAMINOPHEN 975 MG: 325 TABLET, FILM COATED ORAL at 14:21

## 2024-07-21 RX ADMIN — TICAGRELOR 90 MG: 90 TABLET ORAL at 08:42

## 2024-07-21 RX ADMIN — HEPARIN SODIUM 5000 UNITS: 5000 INJECTION INTRAVENOUS; SUBCUTANEOUS at 05:56

## 2024-07-21 RX ADMIN — SODIUM BICARBONATE 650 MG TABLET 650 MG: at 08:41

## 2024-07-21 RX ADMIN — TRAZODONE HYDROCHLORIDE 50 MG: 50 TABLET ORAL at 21:10

## 2024-07-21 RX ADMIN — PRAVASTATIN SODIUM 80 MG: 80 TABLET ORAL at 17:29

## 2024-07-21 RX ADMIN — HEPARIN SODIUM 5000 UNITS: 5000 INJECTION INTRAVENOUS; SUBCUTANEOUS at 21:09

## 2024-07-21 RX ADMIN — SODIUM BICARBONATE 650 MG TABLET 650 MG: at 17:29

## 2024-07-21 RX ADMIN — HEPARIN SODIUM 5000 UNITS: 5000 INJECTION INTRAVENOUS; SUBCUTANEOUS at 14:22

## 2024-07-21 RX ADMIN — ACETAMINOPHEN 975 MG: 325 TABLET, FILM COATED ORAL at 05:56

## 2024-07-21 RX ADMIN — ESCITALOPRAM OXALATE 20 MG: 20 TABLET ORAL at 08:41

## 2024-07-21 RX ADMIN — OXYCODONE HYDROCHLORIDE 5 MG: 5 TABLET ORAL at 21:09

## 2024-07-21 RX ADMIN — AMLODIPINE BESYLATE 5 MG: 5 TABLET ORAL at 08:41

## 2024-07-21 NOTE — PLAN OF CARE
Problem: PAIN - ADULT  Goal: Verbalizes/displays adequate comfort level or baseline comfort level  Description: Interventions:  - Encourage patient to monitor pain and request assistance  - Assess pain using appropriate pain scale  - Administer analgesics based on type and severity of pain and evaluate response  - Implement non-pharmacological measures as appropriate and evaluate response  - Consider cultural and social influences on pain and pain management  - Notify physician/advanced practitioner if interventions unsuccessful or patient reports new pain  Outcome: Progressing     Problem: INFECTION - ADULT  Goal: Absence or prevention of progression during hospitalization  Description: INTERVENTIONS:  - Assess and monitor for signs and symptoms of infection  - Monitor lab/diagnostic results  - Monitor all insertion sites, i.e. indwelling lines, tubes, and drains  - Monitor endotracheal if appropriate and nasal secretions for changes in amount and color  - [place] appropriate cooling/warming therapies per order  - Administer medications as ordered  - Instruct and encourage patient and family to use good hand hygiene technique  - Identify and instruct in appropriate isolation precautions for identified infection/condition  Outcome: Progressing     Problem: SAFETY ADULT  Goal: Patient will remain free of falls  Description: INTERVENTIONS:  - Educate patient/family on patient safety including physical limitations  - Instruct patient to call for assistance with activity   - Consult OT/PT to assist with strengthening/mobility   - Keep Call bell within reach  - Keep bed low and locked with side rails adjusted as appropriate  - Keep care items and personal belongings within reach  - Initiate and maintain comfort rounds  - Make Fall Risk Sign visible to staff  - Offer Toileting every 2 Hours, in advance of need  - Initiate/Maintain bed alarm  - Apply yellow socks and bracelet for high fall risk patients  - Consider  moving patient to room near nurses station  Outcome: Progressing  Goal: Maintain or return to baseline ADL function  Description: INTERVENTIONS:  -  Assess patient's ability to carry out ADLs; assess patient's baseline for ADL function and identify physical deficits which impact ability to perform ADLs (bathing, care of mouth/teeth, toileting, grooming, dressing, etc.)  - Assess/evaluate cause of self-care deficits   - Assess range of motion  - Assess patient's mobility; develop plan if impaired  - Assess patient's need for assistive devices and provide as appropriate  - Encourage maximum independence but intervene and supervise when necessary  - Involve family in performance of ADLs  - Assess for home care needs following discharge   - Consider OT consult to assist with ADL evaluation and planning for discharge  - Provide patient education as appropriate  Outcome: Progressing  Goal: Maintains/Returns to pre admission functional level  Description: INTERVENTIONS:  - Perform AM-PAC 6 Click Basic Mobility/ Daily Activity assessment daily.  - Set and communicate daily mobility goal to care team and patient/family/caregiver.   - Collaborate with rehabilitation services on mobility goals if consulted  - Stand patient 3 times a day  - Ambulate patient 3 times a day  - Out of bed to chair 3 times a day   - Out of bed for meals 3 times a day  - Out of bed for toileting  - Record patient progress and toleration of activity level   Outcome: Progressing     Problem: DISCHARGE PLANNING  Goal: Discharge to home or other facility with appropriate resources  Description: INTERVENTIONS:  - Identify barriers to discharge w/patient and caregiver  - Arrange for needed discharge resources and transportation as appropriate  - Identify discharge learning needs (meds, wound care, etc.)  - Arrange for interpretive services to assist at discharge as needed  - Refer to Case Management Department for coordinating discharge planning if the  patient needs post-hospital services based on physician/advanced practitioner order or complex needs related to functional status, cognitive ability, or social support system  Outcome: Progressing     Problem: Knowledge Deficit  Goal: Patient/family/caregiver demonstrates understanding of disease process, treatment plan, medications, and discharge instructions  Description: Complete learning assessment and assess knowledge base.  Interventions:  - Provide teaching at level of understanding  - Provide teaching via preferred learning methods  Outcome: Progressing     Problem: CARDIOVASCULAR - ADULT  Goal: Maintains optimal cardiac output and hemodynamic stability  Description: INTERVENTIONS:  - Monitor I/O, vital signs and rhythm  - Monitor for S/S and trends of decreased cardiac output  - Administer and titrate ordered vasoactive medications to optimize hemodynamic stability  - Assess quality of pulses, skin color and temperature  - Assess for signs of decreased coronary artery perfusion  - Instruct patient to report change in severity of symptoms  Outcome: Progressing     Problem: METABOLIC, FLUID AND ELECTROLYTES - ADULT  Goal: Electrolytes maintained within normal limits  Description: INTERVENTIONS:  - Monitor labs and assess patient for signs and symptoms of electrolyte imbalances  - Administer electrolyte replacement as ordered  - Monitor response to electrolyte replacements, including repeat lab results as appropriate  - Instruct patient on fluid and nutrition as appropriate  Outcome: Progressing  Goal: Fluid balance maintained  Description: INTERVENTIONS:  - Monitor labs   - Monitor I/O and WT  - Instruct patient on fluid and nutrition as appropriate  - Assess for signs & symptoms of volume excess or deficit  Outcome: Progressing  Goal: Glucose maintained within target range  Description: INTERVENTIONS:  - Monitor Blood Glucose as ordered  - Assess for signs and symptoms of hyperglycemia and hypoglycemia  -  Administer ordered medications to maintain glucose within target range  - Assess nutritional intake and initiate nutrition service referral as needed  Outcome: Progressing     Problem: HEMATOLOGIC - ADULT  Goal: Maintains hematologic stability  Description: INTERVENTIONS  - Assess for signs and symptoms of bleeding or hemorrhage  - Monitor labs  - Administer supportive blood products/factors as ordered and appropriate  Outcome: Progressing     Problem: MUSCULOSKELETAL - ADULT  Goal: Maintain or return mobility to safest level of function  Description: INTERVENTIONS:  - Assess patient's ability to carry out ADLs; assess patient's baseline for ADL function and identify physical deficits which impact ability to perform ADLs (bathing, care of mouth/teeth, toileting, grooming, dressing, etc.)  - Assess/evaluate cause of self-care deficits   - Assess range of motion  - Assess patient's mobility  - Assess patient's need for assistive devices and provide as appropriate  - Encourage maximum independence but intervene and supervise when necessary  - Involve family in performance of ADLs  - Assess for home care needs following discharge   - Consider OT consult to assist with ADL evaluation and planning for discharge  - Provide patient education as appropriate  Outcome: Progressing  Goal: Maintain proper alignment of affected body part  Description: INTERVENTIONS:  - Support, maintain and protect limb and body alignment  - Provide patient/ family with appropriate education  Outcome: Progressing     Problem: Prexisting or High Potential for Compromised Skin Integrity  Goal: Skin integrity is maintained or improved  Description: INTERVENTIONS:  - Identify patients at risk for skin breakdown  - Assess and monitor skin integrity  - Assess and monitor nutrition and hydration status  - Monitor labs   - Assess for incontinence   - Turn and reposition patient  - Assist with mobility/ambulation  - Relieve pressure over bony  prominences  - Avoid friction and shearing  - Provide appropriate hygiene as needed including keeping skin clean and dry  - Evaluate need for skin moisturizer/barrier cream  - Collaborate with interdisciplinary team   - Patient/family teaching  - Consider wound care consult   Outcome: Progressing

## 2024-07-21 NOTE — PROGRESS NOTES
[facility]  Progress Note  Name: Anamaria Parnell I  MRN: [id number]  Unit/Bed#: E5 -01 I Date of Admission: 7/19/2024   Date of Service: 7/21/2024 I Hospital Day: 2    Assessment & Plan   * Gangrene (HCC)  Assessment & Plan  Chronic limb threatening ischemia with right foot pain or tissue loss  Aortoiliac occlusive disease  Peripheral arterial disease  Gangrene of right great toe    79 yoF former smoker, obesity, CKD IV, DM II, HLD, HTN, CAD, L MCA and PCA CVA s/p L TCAR 9/7/23 ([place]), renal artery stenosis, mesenteric artery stenosis, aortoiliac occlusive disease/PAD with chronic right hallex dry gangrene. Patient admitted to [facility] on 7/16/2024 with worsening right great toe pain with gangrene and foot pain. Patient follows with vascular surgery for PAD and chronic limb ischemia. Vascular surgery consulted to evaluate peripheral perfusion and possible revascularization options.     Imaging shows extensive AIOD and peripheral arterial disease with occlusion of the R iliac system, nearly occlusive R CFA, occlusion of popliteal artery with recon of the below knee popliteal and tibials. LISA is very low (0.1) and flatline metatarsal and toe pressures. Patient transferred to [facility] for plan of R femoral endarterectomy with retrograde stenting and left leg bypass which may have to be staged surgeries based on her comorbid conditions    Imaging:  CTA abdomen with runoff w wo contrast 7/18/24: completed - report P    TEA 7/16/24:    R 0.11/--/--; Severe fem-pop disease c HG stenosis/occlusion prox-distal SFA c recon popliteal. Tib/peroneal occlusive disease. Distal YOVANY occlusion.    L 0.31/20/19; Severe diffuse fem-pop disease with HG v occlusion prox-mid SFA with recon distal SFA. Tib/Peronea occlusive disease. Entire PT artery high grade stenosis v occlusion.     Lower extremity vein mapping 7/18/24: Patient R and L GSV patent groin to ankle - R 2.2 -6.4mm, ; L 1.3 -6.8mm    GM-  "pending    Echo - ordered by cardiology, but not yet completed    Echo 8/18/23: Normal LV EF. Mild to mod concentric hypertrophy. Mild MR.     MPI 8/18/24: The ECG and SPECT imaging portions of the stress study are concordant with no evidence of stress induced myocardial ischemia. Left ventricular perfusion is normal.     CV duplex 6/6/24:     R >70% ICA stenosis (363/85, ratio 4.32)    L patent prox-mid ICA stent (139/26. 124/12)      -/78 POX 96% RA AFeb  -Blood cultures 7/17/24- no growth x 72 hrs  -BUN/creat 19/1.49.  A1c 5.9  -Blood cultures - no growth x 72 hours    Plan:   -CLTI with advanced calcific atherosclerotic occlusive disease, R hallux dry gangrene and foot pain. Recurrent R foot cellulitis. Afebrile, non-toxic.   -CTA a/p with runoff demonstrates occlusion of the R iliac system, nearly occlusive R CFA, occlusion of popliteal artery with recon of the below knee popliteal and tibials.   -LISA is very low (0.1) and flatline metatarsal and toe pressures.  -Plan for R CFA endarterectomy with retrograde right EIA stenting, as well as right femoral to tibial bypass. Patient agrees to proceed. Surgery planned for 7/24.  -Cardiology consult appreciated; high risk for major vascular surgery  -Antibiotics as per SLIM  -Continue medical optimization with aspirin and ticagrelor  -Pain and medical management as per SLIM  -Local wound care and plan for R great toe - consult podiatry  -Case d/w Dr. Marquez Doctor           Subjective:  -No new complaints  -Slept well. Eating breakfast. No CP or SOB. No fevers.   -Ongoing R great toe gangrene and intermittent severe pain.   -Receiving APAP 975 TID for pain; no narcotic pain Rx.    Vitals:  /78 (BP Location: Left arm)   Pulse 86   Temp 98 °F (36.7 °C) (Oral)   Resp 18   Ht 4' 10\" (1.473 m)   Wt 65.3 kg (144 lb)   LMP  (LMP Unknown)   SpO2 96%   BMI 30.10 kg/m²     I/Os:  I/O last 3 completed shifts:  In: 600 [P.O.:600]  Out: 350 [Urine:350]  I/O this " "shift:  In: 80 [P.O.:80]  Out: 800 [Urine:800]    Lab Results and Cultures:   Lab Results   Component Value Date    WBC 11.18 (H) 07/20/2024    HGB 9.9 (L) 07/20/2024    HCT 30.4 (L) 07/20/2024    MCV 89 07/20/2024     07/20/2024     Lab Results   Component Value Date    GLUCOSE 130 09/07/2023    CALCIUM 8.0 (L) 07/20/2024    K 3.9 07/20/2024    CO2 19 (L) 07/20/2024     07/20/2024    BUN 19 07/20/2024    CREATININE 1.49 (H) 07/20/2024     Lab Results   Component Value Date    INR 1.17 07/16/2024    INR 0.92 04/18/2024    INR 0.96 09/08/2023    PROTIME 15.6 (H) 07/16/2024    PROTIME 12.9 04/18/2024    PROTIME 13.0 09/08/2023        Blood Culture:   Lab Results   Component Value Date    BLOODCX No Growth After 4 Days. 07/17/2024   ,   Urinalysis:   Lab Results   Component Value Date    COLORU Light Yellow 07/17/2024    CLARITYU Clear 07/17/2024    SPECGRAV 1.016 07/17/2024    PHUR 5.5 07/17/2024    LEUKOCYTESUR Moderate (A) 07/17/2024    NITRITE Negative 07/17/2024    GLUCOSEU Negative 07/17/2024    KETONESU Negative 07/17/2024    BILIRUBINUR Negative 07/17/2024    BLOODU Negative 07/17/2024   ,   Urine Culture:   Lab Results   Component Value Date    URINECX (A) 07/17/2024     >100,000 cfu/ml - Lactobacillus gasseri Lactobacillus species    URINECX <10,000 cfu/ml Micrococcus luteus (A) 07/17/2024    URINECX (A) 07/17/2024     <10,000 cfu/ml - Lactobacillus rhamnosus Lactobacillus species   ,   Wound Culure: No results found for: \"WOUNDCULT\"    Medications:  Current Facility-Administered Medications   Medication Dose Route Frequency    acetaminophen (TYLENOL) tablet 975 mg  975 mg Oral Q8H JAVIER    allopurinol (ZYLOPRIM) tablet 100 mg  100 mg Oral Daily    amLODIPine (NORVASC) tablet 5 mg  5 mg Oral Daily    aspirin chewable tablet 81 mg  81 mg Oral Daily    ceFAZolin (ANCEF) IVPB (premix in dextrose) 1,000 mg 50 mL  1,000 mg Intravenous Q12H    [START ON 7/23/2024] cloNIDine (CATAPRES-TTS-3) 0.3 mg/24 hr " TD weekly patch  1 patch Transdermal Weekly    escitalopram (LEXAPRO) tablet 20 mg  20 mg Oral Daily    heparin (porcine) subcutaneous injection 5,000 Units  5,000 Units Subcutaneous Q8H JAVIER    HYDROmorphone HCl (DILAUDID) injection 0.2 mg  0.2 mg Intravenous Q6H PRN    insulin lispro (HumALOG/ADMELOG) 100 units/mL subcutaneous injection 1-5 Units  1-5 Units Subcutaneous TID AC    labetalol (NORMODYNE) tablet 300 mg  300 mg Oral Q12H JAVIER    lidocaine (LIDODERM) 5 % patch 1 patch  1 patch Topical Daily    ondansetron (ZOFRAN) injection 4 mg  4 mg Intravenous Q6H PRN    oxyCODONE (ROXICODONE) IR tablet 5 mg  5 mg Oral Q6H PRN    pravastatin (PRAVACHOL) tablet 80 mg  80 mg Oral Daily With Dinner    sodium bicarbonate tablet 650 mg  650 mg Oral BID after meals    ticagrelor (BRILINTA) tablet 90 mg  90 mg Oral Q12H JAVIER    traZODone (DESYREL) tablet 50 mg  50 mg Oral HS       Imaging:  Echo - ordered by cardiology, but not yet completed    Echo 8/18/23: Normal LV EF. Mild to mod concentric hypertrophy. Mild MR.     MPI 8/18/24: The ECG and SPECT imaging portions of the stress study are concordant with no evidence of stress induced myocardial ischemia. Left ventricular perfusion is normal.     CTA abdomen with runoff w wo contrast 7/18/24: completed - report P      TEA 7/16/24:  FINDINGS:     Segment                Right                        Left                                            Impression  PSV (cm/s)  EDV  Impression  PSV (cm/s)  EDV    Common Femoral Artery                      74   24                      49   12    Prox Profunda                              34   14                      21    6    Prox SFA               Occluded                     Occluded                       Mid SFA                Occluded                     Occluded                       Dist SFA               Occluded                                         30   15    Proximal Pop                               40   31                       19   10    Distal Pop                                 17   11                      31   18    Tibioperoneal                              18   13                      42   20    Prox Post Tibial                                    Occluded                       Dist Post Tibial                           16   13  Occluded                       Prox. Ant. Tibial                           6    3                      12    6    Dist. Ant. Tibial      Occluded                                         25   12    Dist Peroneal                                                           18   11             CONCLUSION:  Impression:  RIGHT LOWER LIMB:  Severe diffuse disease noted throughout the femoral-popliteal arteries with  high grade stenosis vs occlusion of the proximal-distal superficial femoral  artery with reconstitution to the popliteal artery.  Evidence suggestive of TIb/Peroneal occlusive disease. There is a high grade  stenosis vs occlusion of the distal anterior tibial artery.  Ankle/Brachial index: 0.11 which is in the rest pain/tissue loss range (Prior:  0.10)  PVR/ PPG tracings are absent.  Metatarsal pressure is absent  Great toe pressure is absent     LEFT LOWER LIMB:  Severe diffuse disease noted throughout the femoral-popliteal arteries with  high grade stenosis vs occlusion of the proximal-mid superficial femoral artery  with reconstitution in the distal SFA.  Evidence suggestive of TIb/Peroneal occlusive disease. There is high grade  stenosis vs. occlusion of the entire posterior tibial artery.  Ankle/Brachial index:   0.31  which is in the Ischemic range (Prior 0.35  )  PVR/ PPG tracings are dampened.  Metatarsal pressure of 20 mmHg  Great toe pressure of 19 mmHg, below the healing range        Compared to previous study on 4/18/2024, the right SFA occlusion now extends  into the distal portion and there is a decrease in the left LISA and TBI.      Carotid duplex 6/3/24:   [facility]  REPORT  CLINICAL:  Indications:  6 month surveillance of carotid artery disease. Patient is  asymptomatic at this time.  Operative History:  2023-09-07 ICA stent TCAR  2020-02-26 Cardiac catheterization  Risk Factors  The patient has history of Obesity, HTN, Diabetes (NIDDM (Diet)),  Hyperlipidemia, CKD, PAD, CAD and previous smoking (quit <1yr ago).  Clinical  Right Pressure:  206/78 mm Hg, Left Pressure:  188/78 mm Hg.     FINDINGS:     Right              Impression  PSV  EDV (cm/s)  Direction of Flow  Ratio    Dist. ICA                       58          16                      0.69    Mid. ICA                       108          25                      1.28    Prox. ICA          70%+        363          85                      4.32    Dist CCA                        90          14                              Mid CCA                         84          12                      1.18    Prox CCA                        71          10                      0.99    Ext Carotid                    137           0                      1.63    Prox Vert                       45          10  Antegrade                   Subclavian                     257           8                              Innominate                      72           6                                 Left               Impression     PSV  EDV (cm/s)  Direction of Flow    Dist. ICA                          48           9                       Mid. ICA - Stent                  124          12                       Prox. ICA - Stent  Widely Patent  139          26                       Dist CCA                           79           9                       Prox CCA                           99           8                       Ext Carotid        Severe         393           0                       Prox Vert                          25           0  Antegrade            Subclavian                        256           4                                 CONCLUSION:  Impression  RIGHT:  There is >70% stenosis noted in the internal carotid artery. Plaque is  heterogenous and irregular.  Vertebral artery flow is antegrade. There is no significant subclavian artery  disease.     LEFT:  Widely patent internal carotid artery and stent.  Severe external carotid artery disease is noted.  Vertebral artery flow is antegrade, however is high resistive. There is no  significant subclavian artery disease.     Compared to previous study on 11/15/2023, there is no significant change noted.  Recommend repeat testing in 6 months as per protocol unless otherwise  indicated.      Physical Exam:    General appearance: alert and oriented, in no acute distress  Skin: Skin color, texture, turgor normal. No rashes or lesions  Neurologic: Grossly normal  Head: Normocephalic, without obvious abnormality, atraumatic  Lungs: Few crackles at the L base, otherwise overall clear. No w/r. Resp - non-labored  Heart: RRR S1S2, soft M; + R carotid bruit  Abdomen: soft, non-tender; bowel sounds normal; no masses,  no organomegaly  Extremities: Trace LE edema. R hallex gangrene. Moves toes. No pulses in feet.           Ashly Marion PA-C  7/21/2024  [facility] [place]

## 2024-07-21 NOTE — ASSESSMENT & PLAN NOTE
Patient presenting with right big toe gangrene and associated cellulitis  Patient remains afebrile, nontoxic  Continue IV Ancef

## 2024-07-21 NOTE — PROGRESS NOTES
[facility]  Progress Note  Name: Anamaria Parnell I  MRN: [id number]  Unit/Bed#: E5 -01 I Date of Admission: 7/19/2024   Date of Service: 7/21/2024 I Hospital Day: 2    Assessment & Plan   * Gangrene (HCC)  Assessment & Plan  79 yoF former smoker and vasculopath with DM II,CAD,PAD with chronic right foot wounds x months and eventually developed right great toe gangrene.     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  Patient will require extensive revascularization.  Tentative procedure on 7/24/2024  Cardiology consulted for preop clearance.  Follow echocardiogram results  Podiatry on board.    Cellulitis of toe of right foot  Assessment & Plan  Patient presenting with right big toe gangrene and associated cellulitis  Patient remains afebrile, nontoxic  Continue IV Ancef    Primary hypertension  Assessment & Plan  BP stable  Currently on clonidine, amlodipine, labetalol    Aortoiliac occlusive disease (HCC)  Assessment & Plan  PAD with occlusion with multiple vessels  Vascular surgery on board -plan for extensive revascularization  Continue aspirin/ brillinta / statin for now    Type 2 diabetes mellitus, without long-term current use of insulin (HCC)  Assessment & Plan  Lab Results   Component Value Date    HGBA1C 5.9 (H) 07/09/2024       Recent Labs     07/20/24  1613 07/20/24  2045 07/21/24  0707 07/21/24  1108   POCGLU 142* 148* 98 95         Blood Sugar Average: Last 72 hrs:  (P) 119.75  Hemoglobin A1c indicates good control   Hold home Tradjenta while inpatient  Continue diabetic diet, [facility]  Hypoglycemia protocol.      Stage 4 chronic kidney disease (HCC)  Assessment & Plan  Lab Results   Component Value Date    EGFR 33 07/20/2024    EGFR 32 07/19/2024    EGFR 32 07/18/2024    CREATININE 1.49 (H) 07/20/2024    CREATININE 1.53 (H) 07/19/2024    CREATININE 1.53  (H) 2024     Baseline creatinine 1.2-1.8, currently around baseline  Initially had RICARDO on CKD at the time of admission at [facility]  Avoid nephrotoxic agent, hypotension    CVA (cerebral vascular accident) (HCC)  Assessment & Plan  History of CVA  Continue aspirin, statin    Basilar artery stenosis  Assessment & Plan  history of carotid surgery.    Follows with Dr. Gallo         VTE Pharmacologic Prophylaxis:   Pharmacologic: Heparin  Mechanical VTE Prophylaxis in Place: Yes    Patient Centered Rounds: I have performed bedside rounds with nursing staff today.    Discussions with Specialists or Other Care Team Provider: Discussed with RN    Education and Discussions with Family / Patient: Discussed with patient    Time Spent for Care:  35 minutes .  This time was spent on one or more of the following: performing physical exam; counseling and coordination of care; obtaining or reviewing history; documenting in the medical record; reviewing/ordering tests, medications or procedures; communicating with other healthcare professionals and discussing with patient's family/caregivers.    Current Length of Stay: 2 day(s)    Current Patient Status: Inpatient   Certification Statement: The patient will continue to require additional inpatient hospital stay due to PAD requiring revascularization    Discharge Plan / Estimated Discharge Date: Revascularization surgery on 2024      Code Status: Level 3 - DNAR and DNI      Subjective:   Patient was seen and examined at bedside. The patient stated she is doing well and denies any pain.  She stated she feels very comfortable.      Objective:     Vitals:   Temp (24hrs), Av °F (36.7 °C), Min:97.8 °F (36.6 °C), Max:98.1 °F (36.7 °C)    Temp:  [97.8 °F (36.6 °C)-98.1 °F (36.7 °C)] 98 °F (36.7 °C)  HR:  [71-86] 71  Resp:  [18] 18  BP: (126-159)/(54-78) 126/78  SpO2:  [96 %] 96 %  Body mass index is 30.1 kg/m².     Input and Output Summary (last 24 hours):        Intake/Output Summary (Last 24 hours) at 7/21/2024 1320  Last data filed at 7/21/2024 1216  Gross per 24 hour   Intake 660 ml   Output 1000 ml   Net -340 ml       Physical Exam:     Physical Exam  General: no acute distress  HEENT: NC/AT, PERRL, EOM - normal  Neck: Supple  Pulm/Chest: Normal chest wall expansion, clear breath sounds on both side  CVS: normal S1&S2, capillary refill <2s  Abd: soft, non tender, non distended, bowel sounds +  MSK: move all 4 extremities spontaneously, right big toe gangrene  Skin: warm  CNS: no acute focal neuro deficit      Additional Data:     Labs:    Results from last 7 days   Lab Units 07/20/24  0551   WBC Thousand/uL 11.18*   HEMOGLOBIN g/dL 9.9*   HEMATOCRIT % 30.4*   PLATELETS Thousands/uL 343   SEGS PCT % 79*   LYMPHO PCT % 8*   MONO PCT % 8   EOS PCT % 3     Results from last 7 days   Lab Units 07/20/24  0551 07/17/24  0512 07/16/24  1738   POTASSIUM mmol/L 3.9   < > 4.4   CHLORIDE mmol/L 108   < > 104   CO2 mmol/L 19*   < > 21   BUN mg/dL 19   < > 35*   CREATININE mg/dL 1.49*   < > 2.19*   CALCIUM mg/dL 8.0*   < > 8.8   ALK PHOS U/L  --   --  96   ALT U/L  --   --  4*   AST U/L  --   --  7*    < > = values in this interval not displayed.     Results from last 7 days   Lab Units 07/16/24  1738   INR  1.17       * I Have Reviewed All Lab Data Listed Above.  * Additional Pertinent Lab Tests Reviewed: All Labs For Current Hospital Admission Reviewed    Imaging:      I have reviewed pertinent imaging.      Recent Cultures (last 7 days):     Results from last 7 days   Lab Units 07/17/24  0220 07/17/24  0156   BLOOD CULTURE   --  No Growth After 4 Days.   URINE CULTURE  >100,000 cfu/ml - Lactobacillus gasseri Lactobacillus species*  <10,000 cfu/ml Micrococcus luteus*  <10,000 cfu/ml - Lactobacillus rhamnosus Lactobacillus species*  --        Last 24 Hours Medication List:   Current Facility-Administered Medications   Medication Dose Route Frequency Provider Last Rate     acetaminophen  975 mg Oral Q8H [facility] Kimberly Geiger MD      allopurinol  100 mg Oral Daily Kimberly Geiger MD      amLODIPine  5 mg Oral Daily GRACIELA Alvares      aspirin  81 mg Oral Daily Kimberly Geiger MD      cefazolin  1,000 mg Intravenous Q12H Kimberly Geiger MD 1,000 mg (07/21/24 0846)    [START ON 7/23/2024] cloNIDine  1 patch Transdermal Weekly Kimberly Geiger MD      escitalopram  20 mg Oral Daily Kimberly Geiger MD      heparin (porcine)  5,000 Units Subcutaneous Q8H [facility] Kimberly Geiger MD      HYDROmorphone  0.2 mg Intravenous Q6H PRN Kimberly Geiger MD      insulin lispro  1-5 Units Subcutaneous TID AC Kimberly Geiger MD      labetalol  300 mg Oral Q12H [facility] Kimberly Geiger MD      lidocaine  1 patch Topical Daily Kimberly Geiger MD      ondansetron  4 mg Intravenous Q6H PRN Kimberly Geiger MD      oxyCODONE  5 mg Oral Q6H PRN Kimberly Geiger MD      pravastatin  80 mg Oral Daily With Dinner Kimberly Geiger MD      sodium bicarbonate  650 mg Oral BID after meals GRACIELA Alvares      ticagrelor  90 mg Oral Q12H [facility] Kimberly Geiger MD      traZODone  50 mg Oral HS Kimberly Geiger MD          Today, Patient Was Seen By: Kimberly Geiger MD    ** Please Note: Dragon 360 Dictation voice to text software may have been used in the creation of this document. **

## 2024-07-21 NOTE — PROGRESS NOTES
NEPHROLOGY PROGRESS NOTE   Anamaria Parnell 79 y.o. female MRN: [id number]  Unit/Bed#: E5 -01 Encounter: [id number]  Reason for Consult: RICARDO on CKD IIIB/IV    79-year-old female with history of CKD stage IIIb/IV, hypertension, diabetes, hyperlipidemia, renal artery stenosis, smoker, who originally presented to [facility] due to right big toe pain. Transferred to [facility] for vascular surgery consult for revascularization.     ASSESSMENT/PLAN:  RICARDO on CKD stage IIIB/IV: Suspected secondary to sepsis with soft tissue infection.  -Baseline creatinine 1.4-1.7.  -Originally presented with creatinine 2.1.  -Creatinine is currently at baseline.  -Follows with Dr. NAYE Longoria.  -S/P IV contrast 7/18/2024.  -Most recent renal ultrasound with normal echogenicity and contour of the right kidney, atrophic left kidney.  -UA with +1 protein, 2-4 RBCs, 20-30 WBCs.  -Urine albumin to creatinine ratio 1.0 g.  -Recommend holding losartan the day of endarterectomy.  Recommend pre and post procedural hydration.  Minimize IV contrast load.  -Nephrology will monitor peripherally over the weekend.    Low bicarbonate level: in the setting of CKD.   -will start on oral bicarbonate and continue to monitor.     Hypertension: Blood pressure stable.  History of renal artery stenosis.  -Current medication: Amlodipine 5 mg daily, clonidine 0.3 mg per 24-hour patch weekly, labetalol 300 mg every 12 hours, losartan 100 mg daily.  -Home medication: Labetalol 300 mg every 12 hours, losartan 100 mg daily, amlodipine 5 mg daily, clonidine transdermal patch 0.3 mg weekly.  -Avoid hypotension or high fluctuation in blood pressure.  -Holding parameters adjusted on antihypertensive for systolic blood pressure less than 130.    Secondary hyperparathyroidism:  -Continue Sensipar 30 mg every other day.    Right hallux gangrene: Presenting with worsening right great toe pain.  -Vascular surgery team following.  Planning on right femoral endarterectomy  "07/24.  -Currently on antibiotics.  -Continue local wound care.  -Please see recommendations under RICARDO in regards to dye preporation.     Anemia:  -Avoid IV Venofer in the setting of infection.  -checking iron panel.   -Previous SPEP/UPEP negative for monoclonal gammopathy.  -Poor candidate for DIO with history of CVA and bladder cancer.  -Continue to monitor and transfuse as needed for hemoglobin less than 7.0.    Other: Diabetes, hyperlipidemia, CAD, CVA, bladder cancer    Disposition:  Requiring additional stay due to medical needs.    SUBJECTIVE:  Denies chest pain or shortness of breath. Reports intermittent right toe/foot pain. Denies nausea, vomiting, diarrhea. Reports overall decreased appetite. Denies issues with urination.     OBJECTIVE:  Current Weight: Weight - Scale: 65.3 kg (144 lb)  Vitals:    07/20/24 2158 07/20/24 2342 07/21/24 0708 07/21/24 0845   BP: 135/75 139/54 126/78 126/78   BP Location:   Left arm    Pulse: 83 74 86 71   Resp:  18 18    Temp:  98.1 °F (36.7 °C) 98 °F (36.7 °C)    TempSrc:   Oral    SpO2: 96% 96% 96%    Weight:    65.3 kg (144 lb)   Height:    4' 10\" (1.473 m)       Intake/Output Summary (Last 24 hours) at 7/21/2024 1329  Last data filed at 7/21/2024 1216  Gross per 24 hour   Intake 660 ml   Output 1000 ml   Net -340 ml     General: NAD  Skin: warm, dry  HEENT: Moist mucous membranes, sclera anicteric, normocephalic, atraumatic  Neck: No apparent JVD appreciated  Chest: lung sounds clear B/L  CVS: Regular rate and rhythm  Abdomen: Soft, round, non-tender, +BS  Extremities: No B/L LE edema present, R hallux gangrene  Neuro: alert and oriented  Psych: appropriate mood and affect     Medications:    Current Facility-Administered Medications:     acetaminophen (TYLENOL) tablet 975 mg, 975 mg, Oral, Q8H JAVIER, Kimberly Geiger MD, 975 mg at 07/21/24 0556    allopurinol (ZYLOPRIM) tablet 100 mg, 100 mg, Oral, Daily, Kimberly Geiger MD, 100 mg at 07/21/24 0841    amLODIPine (NORVASC) tablet 5 " mg, 5 mg, Oral, Daily, GRACIELA Alvares, 5 mg at 07/21/24 0841    aspirin chewable tablet 81 mg, 81 mg, Oral, Daily, Kimberly Geiger MD, 81 mg at 07/21/24 0841    ceFAZolin (ANCEF) IVPB (premix in dextrose) 1,000 mg 50 mL, 1,000 mg, Intravenous, Q12H, Kimberly Geiger MD, Last Rate: 100 mL/hr at 07/21/24 0846, 1,000 mg at 07/21/24 0846    [START ON 7/23/2024] cloNIDine (CATAPRES-TTS-3) 0.3 mg/24 hr TD weekly patch, 1 patch, Transdermal, Weekly, Kimberly Geiger MD    escitalopram (LEXAPRO) tablet 20 mg, 20 mg, Oral, Daily, Kimberly Geiger MD, 20 mg at 07/21/24 0841    heparin (porcine) subcutaneous injection 5,000 Units, 5,000 Units, Subcutaneous, Q8H [facility], Kimberly Geiger MD, 5,000 Units at 07/21/24 0556    HYDROmorphone HCl (DILAUDID) injection 0.2 mg, 0.2 mg, Intravenous, Q6H PRN, Kimberly Geiger MD    insulin lispro (HumALOG/ADMELOG) 100 units/mL subcutaneous injection 1-5 Units, 1-5 Units, Subcutaneous, TID AC **AND** Fingerstick Glucose (POCT), , , TID AC, Kimberly Geiger MD    labetalol (NORMODYNE) tablet 300 mg, 300 mg, Oral, Q12H JAVIER, Kimberly Geiger MD, 300 mg at 07/21/24 0841    lidocaine (LIDODERM) 5 % patch 1 patch, 1 patch, Topical, Daily, Kimberly Geiger MD, 1 patch at 07/20/24 0827    ondansetron (ZOFRAN) injection 4 mg, 4 mg, Intravenous, Q6H PRN, Kimberly Geiger MD    oxyCODONE (ROXICODONE) IR tablet 5 mg, 5 mg, Oral, Q6H PRN, Kimberly Geiger MD, 5 mg at 07/21/24 0844    pravastatin (PRAVACHOL) tablet 80 mg, 80 mg, Oral, Daily With Dinner, Kimberly Geiger MD, 80 mg at 07/20/24 1655    sodium bicarbonate tablet 650 mg, 650 mg, Oral, BID after meals, GRACIELA Alvares, 650 mg at 07/21/24 0841    ticagrelor (BRILINTA) tablet 90 mg, 90 mg, Oral, Q12H JAVIER, Kimberly Geiger MD, 90 mg at 07/21/24 0842    traZODone (DESYREL) tablet 50 mg, 50 mg, Oral, HS, Kimberly Geiger MD, 50 mg at 07/20/24 2201    Laboratory Results:  Results from last 7 days   Lab Units 07/20/24  0551 07/19/24  0518 07/18/24  0537 07/17/24  0156 07/16/24  1738   WBC Thousand/uL 11.18* 13.35* 12.31*   <  > 14.08*   HEMOGLOBIN g/dL 9.9* 10.8* 10.4*   < > 11.1*   HEMATOCRIT % 30.4* 33.5* 32.1*   < > 33.5*   PLATELETS Thousands/uL 343 357 333   < > 397*   SODIUM mmol/L 139 140 140   < > 140   POTASSIUM mmol/L 3.9 3.8 3.9   < > 4.4   CHLORIDE mmol/L 108 108 109*   < > 104   CO2 mmol/L 19* 20* 21   < > 21   BUN mg/dL 19 20 22   < > 35*   CREATININE mg/dL 1.49* 1.53* 1.53*   < > 2.19*   CALCIUM mg/dL 8.0* 8.1* 8.2*   < > 8.8   ALK PHOS U/L  --   --   --   --  96   ALT U/L  --   --   --   --  4*   AST U/L  --   --   --   --  7*    < > = values in this interval not displayed.

## 2024-07-21 NOTE — PLAN OF CARE
Problem: PAIN - ADULT  Goal: Verbalizes/displays adequate comfort level or baseline comfort level  Description: Interventions:  - Encourage patient to monitor pain and request assistance  - Assess pain using appropriate pain scale  - Administer analgesics based on type and severity of pain and evaluate response  - Implement non-pharmacological measures as appropriate and evaluate response  - Consider cultural and social influences on pain and pain management  - Notify physician/advanced practitioner if interventions unsuccessful or patient reports new pain  Outcome: Progressing     Problem: INFECTION - ADULT  Goal: Absence or prevention of progression during hospitalization  Description: INTERVENTIONS:  - Assess and monitor for signs and symptoms of infection  - Monitor lab/diagnostic results  - Monitor all insertion sites, i.e. indwelling lines, tubes, and drains  - Monitor endotracheal if appropriate and nasal secretions for changes in amount and color  - Luther appropriate cooling/warming therapies per order  - Administer medications as ordered  - Instruct and encourage patient and family to use good hand hygiene technique  - Identify and instruct in appropriate isolation precautions for identified infection/condition  Outcome: Progressing     Problem: SAFETY ADULT  Goal: Patient will remain free of falls  Description: INTERVENTIONS:  - Educate patient/family on patient safety including physical limitations  - Instruct patient to call for assistance with activity   - Consult OT/PT to assist with strengthening/mobility   - Keep Call bell within reach  - Keep bed low and locked with side rails adjusted as appropriate  - Keep care items and personal belongings within reach  - Initiate and maintain comfort rounds  - Make Fall Risk Sign visible to staff  - Offer Toileting every 2 Hours, in advance of need  - Initiate/Maintain bed alarm  - Apply yellow socks and bracelet for high fall risk patients  - Consider  moving patient to room near nurses station  Outcome: Progressing  Goal: Maintain or return to baseline ADL function  Description: INTERVENTIONS:  -  Assess patient's ability to carry out ADLs; assess patient's baseline for ADL function and identify physical deficits which impact ability to perform ADLs (bathing, care of mouth/teeth, toileting, grooming, dressing, etc.)  - Assess/evaluate cause of self-care deficits   - Assess range of motion  - Assess patient's mobility; develop plan if impaired  - Assess patient's need for assistive devices and provide as appropriate  - Encourage maximum independence but intervene and supervise when necessary  - Involve family in performance of ADLs  - Assess for home care needs following discharge   - Consider OT consult to assist with ADL evaluation and planning for discharge  - Provide patient education as appropriate  Outcome: Progressing  Goal: Maintains/Returns to pre admission functional level  Description: INTERVENTIONS:  - Perform AM-PAC 6 Click Basic Mobility/ Daily Activity assessment daily.  - Set and communicate daily mobility goal to care team and patient/family/caregiver.   - Collaborate with rehabilitation services on mobility goals if consulted  - Stand patient 3 times a day  - Ambulate patient 3 times a day  - Out of bed to chair 3 times a day   - Out of bed for meals 3 times a day  - Out of bed for toileting  - Record patient progress and toleration of activity level   Outcome: Progressing     Problem: DISCHARGE PLANNING  Goal: Discharge to home or other facility with appropriate resources  Description: INTERVENTIONS:  - Identify barriers to discharge w/patient and caregiver  - Arrange for needed discharge resources and transportation as appropriate  - Identify discharge learning needs (meds, wound care, etc.)  - Arrange for interpretive services to assist at discharge as needed  - Refer to Case Management Department for coordinating discharge planning if the  patient needs post-hospital services based on physician/advanced practitioner order or complex needs related to functional status, cognitive ability, or social support system  Outcome: Progressing     Problem: Knowledge Deficit  Goal: Patient/family/caregiver demonstrates understanding of disease process, treatment plan, medications, and discharge instructions  Description: Complete learning assessment and assess knowledge base.  Interventions:  - Provide teaching at level of understanding  - Provide teaching via preferred learning methods  Outcome: Progressing     Problem: CARDIOVASCULAR - ADULT  Goal: Maintains optimal cardiac output and hemodynamic stability  Description: INTERVENTIONS:  - Monitor I/O, vital signs and rhythm  - Monitor for S/S and trends of decreased cardiac output  - Administer and titrate ordered vasoactive medications to optimize hemodynamic stability  - Assess quality of pulses, skin color and temperature  - Assess for signs of decreased coronary artery perfusion  - Instruct patient to report change in severity of symptoms  Outcome: Progressing     Problem: METABOLIC, FLUID AND ELECTROLYTES - ADULT  Goal: Electrolytes maintained within normal limits  Description: INTERVENTIONS:  - Monitor labs and assess patient for signs and symptoms of electrolyte imbalances  - Administer electrolyte replacement as ordered  - Monitor response to electrolyte replacements, including repeat lab results as appropriate  - Instruct patient on fluid and nutrition as appropriate  Outcome: Progressing  Goal: Fluid balance maintained  Description: INTERVENTIONS:  - Monitor labs   - Monitor I/O and WT  - Instruct patient on fluid and nutrition as appropriate  - Assess for signs & symptoms of volume excess or deficit  Outcome: Progressing  Goal: Glucose maintained within target range  Description: INTERVENTIONS:  - Monitor Blood Glucose as ordered  - Assess for signs and symptoms of hyperglycemia and hypoglycemia  -  Administer ordered medications to maintain glucose within target range  - Assess nutritional intake and initiate nutrition service referral as needed  Outcome: Progressing     Problem: HEMATOLOGIC - ADULT  Goal: Maintains hematologic stability  Description: INTERVENTIONS  - Assess for signs and symptoms of bleeding or hemorrhage  - Monitor labs  - Administer supportive blood products/factors as ordered and appropriate  Outcome: Progressing     Problem: MUSCULOSKELETAL - ADULT  Goal: Maintain or return mobility to safest level of function  Description: INTERVENTIONS:  - Assess patient's ability to carry out ADLs; assess patient's baseline for ADL function and identify physical deficits which impact ability to perform ADLs (bathing, care of mouth/teeth, toileting, grooming, dressing, etc.)  - Assess/evaluate cause of self-care deficits   - Assess range of motion  - Assess patient's mobility  - Assess patient's need for assistive devices and provide as appropriate  - Encourage maximum independence but intervene and supervise when necessary  - Involve family in performance of ADLs  - Assess for home care needs following discharge   - Consider OT consult to assist with ADL evaluation and planning for discharge  - Provide patient education as appropriate  Outcome: Progressing  Goal: Maintain proper alignment of affected body part  Description: INTERVENTIONS:  - Support, maintain and protect limb and body alignment  - Provide patient/ family with appropriate education  Outcome: Progressing     Problem: Prexisting or High Potential for Compromised Skin Integrity  Goal: Skin integrity is maintained or improved  Description: INTERVENTIONS:  - Identify patients at risk for skin breakdown  - Assess and monitor skin integrity  - Assess and monitor nutrition and hydration status  - Monitor labs   - Assess for incontinence   - Turn and reposition patient  - Assist with mobility/ambulation  - Relieve pressure over bony  prominences  - Avoid friction and shearing  - Provide appropriate hygiene as needed including keeping skin clean and dry  - Evaluate need for skin moisturizer/barrier cream  - Collaborate with interdisciplinary team   - Patient/family teaching  - Consider wound care consult   Outcome: Progressing

## 2024-07-21 NOTE — ASSESSMENT & PLAN NOTE
Chronic limb threatening ischemia with right foot pain or tissue loss  Aortoiliac occlusive disease  Peripheral arterial disease  Gangrene of right great toe    79 yoF former smoker, obesity, CKD IV, DM II, HLD, HTN, CAD, L MCA and PCA CVA s/p L TCAR 9/7/23 ([place]), renal artery stenosis, mesenteric artery stenosis, aortoiliac occlusive disease/PAD with chronic right hallex dry gangrene. Patient admitted to [facility] on 7/16/2024 with worsening right great toe pain with gangrene and foot pain. Patient follows with vascular surgery for PAD and chronic limb ischemia. Vascular surgery consulted to evaluate peripheral perfusion and possible revascularization options.     Imaging shows extensive AIOD and peripheral arterial disease with occlusion of the R iliac system, nearly occlusive R CFA, occlusion of popliteal artery with recon of the below knee popliteal and tibials. LISA is very low (0.1) and flatline metatarsal and toe pressures. Patient transferred to [facility] for plan of R femoral endarterectomy with retrograde stenting and left leg bypass which may have to be staged surgeries based on her comorbid conditions    Imaging:  CTA abdomen with runoff w wo contrast 7/18/24: completed - report P    TEA 7/16/24:    R 0.11/--/--; Severe fem-pop disease c HG stenosis/occlusion prox-distal SFA c recon popliteal. Tib/peroneal occlusive disease. Distal YOVANY occlusion.    L 0.31/20/19; Severe diffuse fem-pop disease with HG v occlusion prox-mid SFA with recon distal SFA. Tib/Peronea occlusive disease. Entire PT artery high grade stenosis v occlusion.     Lower extremity vein mapping 7/18/24: Patient R and L GSV patent groin to ankle - R 2.2 -6.4mm, ; L 1.3 -6.8mm    GM- pending    Echo - ordered by cardiology, but not yet completed    Echo 8/18/23: Normal LV EF. Mild to mod concentric hypertrophy. Mild MR.     MPI 8/18/24: The ECG and SPECT imaging portions of the stress study are concordant with no evidence of stress induced  myocardial ischemia. Left ventricular perfusion is normal.     CV duplex 6/6/24:     R >70% ICA stenosis (363/85, ratio 4.32)    L patent prox-mid ICA stent (139/26. 124/12)      -/78 POX 96% RA AFeb  -Blood cultures 7/17/24- no growth x 72 hrs  -BUN/creat 19/1.49.  A1c 5.9  -Blood cultures - no growth x 72 hours    Plan:   -CLTI with advanced calcific atherosclerotic occlusive disease, R hallux dry gangrene and foot pain. Recurrent R foot cellulitis. Afebrile, non-toxic.   -CTA a/p with runoff demonstrates occlusion of the R iliac system, nearly occlusive R CFA, occlusion of popliteal artery with recon of the below knee popliteal and tibials.   -LISA is very low (0.1) and flatline metatarsal and toe pressures.  -Plan for R CFA endarterectomy with retrograde right EIA stenting, as well as right femoral to tibial bypass. Patient agrees to proceed. Surgery planned for 7/24.  -Cardiology consult appreciated; high risk for major vascular surgery  -Antibiotics as per SLIM  -Continue medical optimization with aspirin and ticagrelor  -Pain and medical management as per SLIM  -Local wound care and plan for R great toe - consult podiatry  -Case d/w Dr. Alma Schuler

## 2024-07-21 NOTE — UTILIZATION REVIEW
Initial Clinical Review  The patient was transferred to [facility] on 7/19  from [facility], where care began on 7/16. 3 midnights have already been surpassed with active ongoing care.       Admission: Date/Time/Statement:   Admission Orders (From admission, onward)       Ordered        07/19/24 1636  INPATIENT ADMISSION  Once                          Orders Placed This Encounter   Procedures    INPATIENT ADMISSION     Standing Status:   Standing     Number of Occurrences:   1     Order Specific Question:   Level of Care     Answer:   Med Surg [16]     Order Specific Question:   Estimated length of stay     Answer:   More than 2 Midnights     Order Specific Question:   Certification     Answer:   I certify that inpatient services are medically necessary for this patient for a duration of greater than two midnights. See H&P and MD Progress Notes for additional information about the patient's course of treatment.       Initial Presentation: 79 y.o. female presents to [facility] as a transfer from [facility] for higher level of care. Admitted as Inpatient to med surg for gangrene right great toe, occlusion of right iliac system , occluded common femoral artery , right popliteal artery. Will require extensive revascularization Tentative vascular surgery procedure 7/24. Cardiology consulted for pre-op clearance. Podiatry consulted. Continue IV ancef for cellulitis associated with right great toe gangrene , continue ASA, brilinta, statin, Hold home tradjenta, monitor POC glucose, SSI coverage. Baseline creatinine 1.2-1.8. Currently at baseline, continue to avoid nephrotoxic agents, avoid hypotension and monitor renal function     Anticipated Length of Stay/Certification Statement: Patient will be admitted on an Inpatient basis with an anticipated length of stay of more than 2 midnights.   Justification for Hospital Stay: Right big toe gangrene, PAD requiring revascularization        Date: 7/20   Day 2:    Cardiology  Severe vasculopathic disease-coronary artery disease, cerebrovascular disease, mesenteric artery, renal artery and peripheral arterial disease. Given her risk factors as above with known coronary artery disease, history of stroke, hypertension, type 2 diabetes, advanced chronic kidney disease with peripheral arterial disease, and significant ambulatory dysfunction where she is basically wheelchair-bound she is going to be high risk for surgery. EKG changes noted from prior, QRS now widened with nonspecific interventricular conduction delay which is bordering on left bundle branch block with anterior infarct type pattern.  Prior ECGs also have anterior infarct type pattern but without wide QRS  Will check new echo for further evaluation of underlying LV function Fortunately she quit smoking about 4 years ago...  But unfortunately, she smoked 2 packs a day for about 55 years -contributing to all the disease as described above. Nuclear stress test last year with no evidence of stress-induced ischemia She does not have unstable angina right now or acute MI however so while her risk is high for surgery given all of her comorbidities, it is not prohibitive to prevent her from having surgery at this point and the risks of not performing surgery are significant as she would certainly develop a serious and infection and sepsis if the gangrenous region of her right foot is not dealt with    Date: 7/21  Day 3: Has surpassed a 2nd midnight with active treatments and services. Continue IV cefazolin  Local wound care, plan for right great toe  per podiatry   -Plan for R CFA endarterectomy with retrograde right EIA stenting, as well as right femoral to tibial bypass. Patient agrees to proceed. Surgery planned for 7/24.       ED Triage Vitals   Temperature Pulse Respirations Blood Pressure SpO2 Pain Score   07/19/24 1715 07/19/24 1715 07/19/24 1715 07/19/24 1715 07/19/24 1715 07/19/24 1700    98 °F (36.7 °C) 73 20 121/70 98 % No Pain     Weight (last 2 days)       Date/Time Weight    07/21/24 0845 65.3 (144)    07/19/24 17:15:50 65.3 (144)            Vital Signs (last 3 days)       Date/Time Temp Pulse Resp BP MAP (mmHg) SpO2 Calculated FIO2 (%) - Nasal Cannula Nasal Cannula O2 Flow Rate (L/min) O2 Device Patient Position - Orthostatic VS Pain    07/21/24 0845 -- 71 -- 126/78 -- -- -- -- -- -- --    07/21/24 0844 -- -- -- -- -- -- -- -- -- -- 2    07/21/24 07:08:36 98 °F (36.7 °C) 86 18 126/78 94 96 % -- -- None (Room air) Lying --    07/21/24 0556 -- -- -- -- -- -- -- -- -- -- No Pain    07/20/24 23:42:18 98.1 °F (36.7 °C) 74 18 139/54 82 96 % -- -- -- -- --    07/20/24 2200 -- -- -- -- -- -- -- -- -- -- No Pain    07/20/24 21:58:56 -- 83 -- 135/75 95 96 % -- -- -- -- --    07/20/24 1736 -- -- -- -- -- -- -- -- -- -- 8    07/20/24 15:25:31 97.8 °F (36.6 °C) 80 -- 159/63 95 96 % -- -- -- -- --    07/20/24 1500 -- -- -- -- -- -- -- -- -- -- 1    07/20/24 0900 -- -- -- -- -- -- -- -- -- -- 2    07/20/24 07:34:27 98 °F (36.7 °C) 81 16 149/74 99 97 % 28 2 L/min Nasal cannula Lying --    07/20/24 0659 -- -- -- -- -- 94 % 28 2 L/min Nasal cannula -- 2    07/20/24 0500 -- -- -- -- -- 89 % -- -- None (Room air) -- --    07/19/24 23:31:59 98 °F (36.7 °C) 72 18 131/47 75 93 % -- -- None (Room air) Lying --    07/19/24 22:17:52 98 °F (36.7 °C) 84 -- 113/73 86 98 % -- -- -- -- --    07/19/24 2213 -- 88 -- 113/73 -- -- -- -- None (Room air) -- 7    07/19/24 17:15:50 98 °F (36.7 °C) 73 20 121/70 87 98 % -- -- -- Lying --    07/19/24 1700 -- -- -- -- -- -- -- -- -- -- No Pain              Pertinent Labs/Diagnostic Test Results:   Radiology:  No orders to display     Cardiology:  Echo complete w/ contrast if indicated   Final Result by Timothy Rey DO (07/21 8583)        Left Ventricle: Left ventricular cavity size is normal. Wall thickness    is severely increased. The left ventricular ejection fraction is 41%  by    biplane measurement. Systolic function is mildly reduced. Global    longitudinal strain is reduced at -10% with moderate to severe strain    reduction in the ED mid to basal anteroseptal septal and inferior walls..    Unable to grade diastolic dysfunction given the severe degree of mitral    annular calcification.     The following segments are hypokinetic: basal inferoseptal, mid    inferoseptal, apical septal, apical inferior, apical lateral and apex.     All other segments are normal.     IVS: There is abnormal septal motion consistent with left bundle branch    block.     Left Atrium: The atrium is severely dilated (>48 mL/m2).     Atrial Septum: There is a possible small patent foramen ovale seen with    color Doppler.     Mitral Valve: There is severe annular calcification. There is moderate    regurgitation.     Tricuspid Valve: There is mild regurgitation. The right ventricular    systolic pressure is moderately elevated. The estimated right ventricular    systolic pressure is 59.00 mmHg.     Aorta: The aortic root is mildly dilated. The ascending aorta is mildly    dilated. The aortic root is 3.40 cm. The ascending aorta is 3.7 cm.     IVC/SVC: The right atrial pressure is estimated at 15.0 mmHg. The    inferior vena cava is dilated. Respirophasic changes were blunted (less    than 50% variation).     Pericardium: There is a trivial pericardial effusion anterior to the    heart.           GI:  No orders to display           Results from last 7 days   Lab Units 07/20/24  0551 07/19/24  0518 07/18/24  0537 07/17/24  0512 07/17/24  0156   WBC Thousand/uL 11.18* 13.35* 12.31* 14.69* 16.56*   HEMOGLOBIN g/dL 9.9* 10.8* 10.4* 9.7* 11.3*   HEMATOCRIT % 30.4* 33.5* 32.1* 30.4* 33.4*   PLATELETS Thousands/uL 343 357 333 342 359   TOTAL NEUT ABS Thousands/µL 8.97* 11.11* 10.35*  --   --          Results from last 7 days   Lab Units 07/20/24  0551 07/19/24  0518 07/18/24  0537 07/17/24  0512 07/16/24  1494    SODIUM mmol/L 139 140 140 137 140   POTASSIUM mmol/L 3.9 3.8 3.9 3.9 4.4   CHLORIDE mmol/L 108 108 109* 106 104   CO2 mmol/L 19* 20* 21 20* 21   ANION GAP mmol/L 12 12 10 11 15*   BUN mg/dL 19 20 22 31* 35*   CREATININE mg/dL 1.49* 1.53* 1.53* 1.79* 2.19*   EGFR ml/min/1.73sq m 33 32 32 26 21   CALCIUM mg/dL 8.0* 8.1* 8.2* 7.6* 8.8     Results from last 7 days   Lab Units 07/16/24  1738   AST U/L 7*   ALT U/L 4*   ALK PHOS U/L 96   TOTAL PROTEIN g/dL 8.1   ALBUMIN g/dL 3.8   TOTAL BILIRUBIN mg/dL 0.38     Results from last 7 days   Lab Units 07/21/24  1108 07/21/24  0707 07/20/24  2045 07/20/24  1613 07/20/24  1117 07/20/24  0733 07/19/24  2018 07/19/24  1738 07/19/24  1057 07/19/24  0547 07/18/24  2125 07/18/24  1611   POC GLUCOSE mg/dl 95 98 148* 142* 135 104 109 127 121 102 100 128     Results from last 7 days   Lab Units 07/20/24  0551 07/19/24  0518 07/18/24  0537 07/17/24  0512 07/16/24  1738   GLUCOSE RANDOM mg/dL 84 100 108 135 106                       Results from last 7 days   Lab Units 07/16/24  1738   PROTIME seconds 15.6*   INR  1.17   PTT seconds 41*               Results from last 7 days   Lab Units 07/20/24  0551   IRON SATURATION % 18   IRON ug/dL 21*   TIBC ug/dL 116*                   Results from last 7 days   Lab Units 07/17/24  0220   CLARITY UA  Clear   COLOR UA  Light Yellow   SPEC GRAV UA  1.016   PH UA  5.5   GLUCOSE UA mg/dl Negative   KETONES UA mg/dl Negative   BLOOD UA  Negative   PROTEIN UA mg/dl 50 (1+)*   NITRITE UA  Negative   BILIRUBIN UA  Negative   UROBILINOGEN UA (BE) mg/dl <2.0   LEUKOCYTES UA  Moderate*   WBC UA /hpf 20-30*   RBC UA /hpf 2-4*   BACTERIA UA /hpf None Seen   EPITHELIAL CELLS WET PREP /hpf Occasional                                 Results from last 7 days   Lab Units 07/17/24  0220 07/17/24  0156   BLOOD CULTURE   --  No Growth After 4 Days.   URINE CULTURE  >100,000 cfu/ml - Lactobacillus gasseri Lactobacillus species*  <10,000 cfu/ml Micrococcus luteus*   <10,000 cfu/ml - Lactobacillus rhamnosus Lactobacillus species*  --                    ED Treatment-Medication Administration - No Administrations Displayed (No Start Event Found)       None            Past Medical History:   Diagnosis Date    Aphasia as late effect of cerebrovascular accident (CVA) 08/16/2023    Arthritis     Chronic kidney disease     Diabetes mellitus ([facility])     Embolism and 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 ([facility]) 01/20/2021    Endometriosis     High cholesterol     History of left common carotid artery stent placement 10/27/2023    Hypertension     Kidney disease, chronic, stage III (moderate, EGFR 30-59 ml/min) ([facility])     Lumbar disc herniation     Median arcuate ligament syndrome ([facility]) 11/05/2019    Neuropathy     Obesity (BMI 30-39.9) 12/04/2017    Obesity, morbid ([facility]) 01/20/2021    Peripheral vascular disease ([facility])     Pneumonia     Shoulder injury     left    Spinal stenosis     Stroke ([facility]) 2015    Memory loss     Present on Admission:   Gangrene ([facility])   Stage 4 chronic kidney disease ([facility])   Aortoiliac occlusive disease ([facility])   Type 2 diabetes mellitus, without long-term current use of insulin ([facility])   Basilar artery stenosis   CVA (cerebral vascular accident) ([facility])   Primary hypertension      Admitting Diagnosis: Sepsis ([facility]) [A41.9]  Age/Sex: 79 y.o. female  Admission Orders:  Scheduled Medications:  acetaminophen, 975 mg, Oral, Q8H JAVIER  allopurinol, 100 mg, Oral, Daily  amLODIPine, 5 mg, Oral, Daily  aspirin, 81 mg, Oral, Daily  cefazolin, 1,000 mg, Intravenous, Q12H  [START ON 7/23/2024] cloNIDine, 1 patch, Transdermal, Weekly  escitalopram, 20 mg, Oral, Daily  heparin (porcine), 5,000 Units, Subcutaneous, Q8H JAVIER  insulin lispro, 1-5 Units, Subcutaneous, TID AC  labetalol, 300 mg, Oral, Q12H JAVIER  lidocaine, 1 patch, Topical, Daily  pravastatin, 80 mg, Oral, Daily With Dinner  sodium bicarbonate, 650 mg, Oral, BID after meals  ticagrelor, 90 mg, Oral, Q12H JAVIER  traZODone,  50 mg, Oral, HS      Continuous IV Infusions:     PRN Meds:  HYDROmorphone, 0.2 mg, Intravenous, Q6H PRN  ondansetron, 4 mg, Intravenous, Q6H PRN  oxyCODONE, 5 mg, Oral, Q6H PRN  PO x 3 doses         IP CONSULT TO CARDIOLOGY  IP CONSULT TO CARDIOLOGY    Network Utilization Review Department  ATTENTION: Please call with any questions or concerns to [phone] and carefully listen to the prompts so that you are directed to the right person. All voicemails are confidential.   For Discharge needs, contact Care Management DC Support Team at [phone] opt. 2  Send all requests for admission clinical reviews, approved or denied determinations and any other requests to dedicated fax number below belonging to the campus where the patient is receiving treatment. List of dedicated fax numbers for the Facilities:  FACILITY NAME UR FAX NUMBER   ADMISSION DENIALS (Administrative/Medical Necessity) [phone]   DISCHARGE SUPPORT TEAM (NETWORK) [phone]   PARENT CHILD HEALTH (Maternity/NICU/Pediatrics) [phone]   [facility] [phone]   General acute hospital [phone]   [facility] [phone]   [facility] [phone]   [facility] [phone]   [facility] [phone]   [facility] [phone]   [facility] [phone]   [facility] [phone]   Critical access hospital [phone]   [facility] [phone]   [facility] [phone]

## 2024-07-21 NOTE — ASSESSMENT & PLAN NOTE
Lab Results   Component Value Date    HGBA1C 5.9 (H) 07/09/2024       Recent Labs     07/20/24  1613 07/20/24  2045 07/21/24  0707 07/21/24  1108   POCGLU 142* 148* 98 95         Blood Sugar Average: Last 72 hrs:  (P) 119.75  Hemoglobin A1c indicates good control   Hold home Tradjenta while inpatient  Continue diabetic diet, sliding scale coverage with ACHS checks  Hypoglycemia protocol.

## 2024-07-21 NOTE — ASSESSMENT & PLAN NOTE
78 yo female ex-smoker w/ a hx of obesity, CKD IV, DM II, HLD, HTN, CAD, L MCA and PCA CVA S/P L TCAR 9/7/23 ([place]), FÉLIX, mesenteric artery stenosis and aortoiliac occlusive disease w/ PAD and chronic R hallux dry gangrene presented to [facility] on 7/16/24 w/ worsening R great toe pain and R hallux gangrene. Pt seen in consult by nephrology who continues to follow pt for RICARDO on CKD. Pt was transferred to [facility] on 7/19 w/ plans for R femoral endarterectomy w/ retrograde stenting and left leg bypass, which may have to be staged surgeries based on her comorbid conditions.    Vascular surgery consulted for possible revascularization options for known CLTI w/ AIOD, PAD and R hallux dry gangrene. Pt follows w/ vascular surgery for PAD and chronic limb ischemia; last seen in the office on 6/4/24. Imaging shows extensive AIOD and peripheral arterial disease with occlusion of the R iliac system, nearly occlusive R CFA, and occlusion of popliteal artery with recon of the below knee popliteal and tibials. R LISA: 0.11/-/-. Plan for R fem endart w/ retrograde iliac intervention and R femoral to BK pop bypass w/ GSV on 7/24.    Diagnostics:  -7/22/24 Xray R foot: (PENDING)  -7/21/24 Echo: Left ventricular cavity size is normal. Wall thickness is severely increased. The left ventricular ejection fraction is 41% by biplane measurement. Systolic function is mildly reduced. Global longitudinal strain is reduced at -10% with moderate to severe strain reduction in the ED mid to basal anteroseptal septal and inferior walls.. Unable to grade diastolic dysfunction given the severe degree of mitral annular calcification.   -7/18/24 LE vein mapping: Right: The great saphenous vein is patent  from the groin to the ankle. The intraluminal diameter measurements range from 2.2mm to 6.4mm throughout. Left: The great saphenous vein is patent from the groin to the ankle. The intraluminal diameter measurements range from 1.3mm to 6.8mm  "throughout.  -7/18/24 CTA abd w/ runoff: (COMPLETED. REPORT PENDING)  -7/16/24 LEAD: Right: Severe diffuse disease noted throughout the femoral-popliteal arteries with high grade stenosis vs occlusion of the proximal-distal superficial femoral artery with reconstitution to the popliteal artery. Evidence suggestive of TIb/Peroneal occlusive disease. There is a high grade stenosis vs occlusion of the distal anterior tibial artery. R LISA: 0.11/-/-. Left: Severe diffuse disease noted throughout the femoral-popliteal arteries with high grade stenosis vs occlusion of the proximal-mid superficial femoral artery with reconstitution in the distal SFA. Evidence suggestive of TIb/Peroneal occlusive disease. There is high grade stenosis vs. occlusion of the entire posterior tibial artery. L LISA: 0.31/20/19.    Plan:   -CLTI w/ advanced calcific atherosclerotic AIOD and PAD w/ R hallux dry gangrene and recurrent R foot cellulitis   -BC (-) x5 days  -Continue ABX per primary team  -Podiatry consulted w/ plans to continue local wound care and repeat foot xray; input appreciated  -CTA abd w/ runoff shows occlusion of the R iliac system, nearly occlusive R CFA, occlusion of popliteal artery with recon of the below knee popliteal and tibials   -LEAD shows R LISA: 0.11/-/-  -Plan for R fem endart w/ retrograde R iliac intervention and fem to BK pop bypass in OR; scheduled for 7/24  -Cardiology consult appreciated. Per cardiology, \"risk for surgery is high however her risk if nothing is done regarding her foot wound is also very high as this will certainly cause complications down the road if not addressed.\"   -Continue ASA, brilinta and pravastatin for medical management  -Nephrology following for RICARDO on CKD; input appreciated  -Continue medical management per primary team  -Will discuss w/ Dr. Peterson  "

## 2024-07-21 NOTE — ASSESSMENT & PLAN NOTE
Lab Results   Component Value Date    EGFR 33 07/20/2024    EGFR 32 07/19/2024    EGFR 32 07/18/2024    CREATININE 1.49 (H) 07/20/2024    CREATININE 1.53 (H) 07/19/2024    CREATININE 1.53 (H) 07/18/2024     Baseline creatinine 1.2-1.8, currently around baseline  Initially had RICARDO on CKD at the time of admission at [facility]  Avoid nephrotoxic agent, hypotension

## 2024-07-21 NOTE — ASSESSMENT & PLAN NOTE
79 yoF former smoker and vasculopath with DM II,CAD,PAD with chronic right foot wounds x months and eventually developed right great toe gangrene.     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  Patient will require extensive revascularization.  Tentative procedure on 7/24/2024  Cardiology consulted for preop clearance.  Follow echocardiogram results  Podiatry on board.

## 2024-07-22 ENCOUNTER — APPOINTMENT (INPATIENT)
Dept: RADIOLOGY | Facility: HOSPITAL | Age: 79
DRG: 278 | End: 2024-07-22
Payer: COMMERCIAL

## 2024-07-22 LAB
ANION GAP SERPL CALCULATED.3IONS-SCNC: 8 MMOL/L (ref 4–13)
BACTERIA BLD CULT: NORMAL
BASOPHILS # BLD AUTO: 0.06 THOUSANDS/ÂΜL (ref 0–0.1)
BASOPHILS NFR BLD AUTO: 1 % (ref 0–1)
BUN SERPL-MCNC: 21 MG/DL (ref 5–25)
CALCIUM SERPL-MCNC: 8.3 MG/DL (ref 8.4–10.2)
CHLORIDE SERPL-SCNC: 106 MMOL/L (ref 96–108)
CO2 SERPL-SCNC: 24 MMOL/L (ref 21–32)
CREAT SERPL-MCNC: 1.49 MG/DL (ref 0.6–1.3)
EOSINOPHIL # BLD AUTO: 0.31 THOUSAND/ÂΜL (ref 0–0.61)
EOSINOPHIL NFR BLD AUTO: 2 % (ref 0–6)
ERYTHROCYTE [DISTWIDTH] IN BLOOD BY AUTOMATED COUNT: 14.1 % (ref 11.6–15.1)
GFR SERPL CREATININE-BSD FRML MDRD: 33 ML/MIN/1.73SQ M
GLUCOSE SERPL-MCNC: 108 MG/DL (ref 65–140)
GLUCOSE SERPL-MCNC: 118 MG/DL (ref 65–140)
GLUCOSE SERPL-MCNC: 121 MG/DL (ref 65–140)
GLUCOSE SERPL-MCNC: 125 MG/DL (ref 65–140)
GLUCOSE SERPL-MCNC: 138 MG/DL (ref 65–140)
HCT VFR BLD AUTO: 32.2 % (ref 34.8–46.1)
HGB BLD-MCNC: 10.6 G/DL (ref 11.5–15.4)
IMM GRANULOCYTES # BLD AUTO: 0.27 THOUSAND/UL (ref 0–0.2)
IMM GRANULOCYTES NFR BLD AUTO: 2 % (ref 0–2)
LYMPHOCYTES # BLD AUTO: 0.83 THOUSANDS/ÂΜL (ref 0.6–4.47)
LYMPHOCYTES NFR BLD AUTO: 6 % (ref 14–44)
MCH RBC QN AUTO: 29.4 PG (ref 26.8–34.3)
MCHC RBC AUTO-ENTMCNC: 32.9 G/DL (ref 31.4–37.4)
MCV RBC AUTO: 89 FL (ref 82–98)
MONOCYTES # BLD AUTO: 1.19 THOUSAND/ÂΜL (ref 0.17–1.22)
MONOCYTES NFR BLD AUTO: 9 % (ref 4–12)
NEUTROPHILS # BLD AUTO: 10.36 THOUSANDS/ÂΜL (ref 1.85–7.62)
NEUTS SEG NFR BLD AUTO: 80 % (ref 43–75)
NRBC BLD AUTO-RTO: 0 /100 WBCS
PLATELET # BLD AUTO: 351 THOUSANDS/UL (ref 149–390)
PMV BLD AUTO: 10.2 FL (ref 8.9–12.7)
POTASSIUM SERPL-SCNC: 4.1 MMOL/L (ref 3.5–5.3)
RBC # BLD AUTO: 3.61 MILLION/UL (ref 3.81–5.12)
SODIUM SERPL-SCNC: 138 MMOL/L (ref 135–147)
WBC # BLD AUTO: 13.02 THOUSAND/UL (ref 4.31–10.16)

## 2024-07-22 PROCEDURE — 93971 EXTREMITY STUDY: CPT | Performed by: SURGERY

## 2024-07-22 PROCEDURE — 99232 SBSQ HOSP IP/OBS MODERATE 35: CPT | Performed by: INTERNAL MEDICINE

## 2024-07-22 PROCEDURE — 99232 SBSQ HOSP IP/OBS MODERATE 35: CPT | Performed by: NURSE PRACTITIONER

## 2024-07-22 PROCEDURE — 97167 OT EVAL HIGH COMPLEX 60 MIN: CPT

## 2024-07-22 PROCEDURE — 85025 COMPLETE CBC W/AUTO DIFF WBC: CPT | Performed by: INTERNAL MEDICINE

## 2024-07-22 PROCEDURE — 80048 BASIC METABOLIC PNL TOTAL CA: CPT | Performed by: INTERNAL MEDICINE

## 2024-07-22 PROCEDURE — 99223 1ST HOSP IP/OBS HIGH 75: CPT | Performed by: PODIATRIST

## 2024-07-22 PROCEDURE — 82948 REAGENT STRIP/BLOOD GLUCOSE: CPT

## 2024-07-22 PROCEDURE — 97163 PT EVAL HIGH COMPLEX 45 MIN: CPT

## 2024-07-22 PROCEDURE — 73630 X-RAY EXAM OF FOOT: CPT

## 2024-07-22 PROCEDURE — 99232 SBSQ HOSP IP/OBS MODERATE 35: CPT

## 2024-07-22 RX ORDER — METOPROLOL SUCCINATE 100 MG/1
100 TABLET, EXTENDED RELEASE ORAL DAILY
Status: DISCONTINUED | OUTPATIENT
Start: 2024-07-22 | End: 2024-07-29

## 2024-07-22 RX ADMIN — TICAGRELOR 90 MG: 90 TABLET ORAL at 22:04

## 2024-07-22 RX ADMIN — ALLOPURINOL 100 MG: 100 TABLET ORAL at 08:28

## 2024-07-22 RX ADMIN — TRAZODONE HYDROCHLORIDE 50 MG: 50 TABLET ORAL at 21:59

## 2024-07-22 RX ADMIN — CEFAZOLIN SODIUM 1000 MG: 1 SOLUTION INTRAVENOUS at 08:28

## 2024-07-22 RX ADMIN — OXYCODONE HYDROCHLORIDE 5 MG: 5 TABLET ORAL at 08:27

## 2024-07-22 RX ADMIN — ACETAMINOPHEN 975 MG: 325 TABLET, FILM COATED ORAL at 15:29

## 2024-07-22 RX ADMIN — HEPARIN SODIUM 5000 UNITS: 5000 INJECTION INTRAVENOUS; SUBCUTANEOUS at 21:59

## 2024-07-22 RX ADMIN — HEPARIN SODIUM 5000 UNITS: 5000 INJECTION INTRAVENOUS; SUBCUTANEOUS at 15:30

## 2024-07-22 RX ADMIN — ACETAMINOPHEN 975 MG: 325 TABLET, FILM COATED ORAL at 21:59

## 2024-07-22 RX ADMIN — HEPARIN SODIUM 5000 UNITS: 5000 INJECTION INTRAVENOUS; SUBCUTANEOUS at 05:39

## 2024-07-22 RX ADMIN — ESCITALOPRAM OXALATE 20 MG: 20 TABLET ORAL at 08:28

## 2024-07-22 RX ADMIN — ACETAMINOPHEN 975 MG: 325 TABLET, FILM COATED ORAL at 05:39

## 2024-07-22 RX ADMIN — LABETALOL HYDROCHLORIDE 300 MG: 200 TABLET, FILM COATED ORAL at 08:28

## 2024-07-22 RX ADMIN — SODIUM BICARBONATE 650 MG TABLET 650 MG: at 17:15

## 2024-07-22 RX ADMIN — TICAGRELOR 90 MG: 90 TABLET ORAL at 08:27

## 2024-07-22 RX ADMIN — ASPIRIN 81 MG CHEWABLE TABLET 81 MG: 81 TABLET CHEWABLE at 08:28

## 2024-07-22 RX ADMIN — AMLODIPINE BESYLATE 5 MG: 5 TABLET ORAL at 08:28

## 2024-07-22 RX ADMIN — SODIUM BICARBONATE 650 MG TABLET 650 MG: at 08:28

## 2024-07-22 RX ADMIN — CEFAZOLIN SODIUM 1000 MG: 1 SOLUTION INTRAVENOUS at 21:59

## 2024-07-22 RX ADMIN — PRAVASTATIN SODIUM 80 MG: 80 TABLET ORAL at 15:30

## 2024-07-22 NOTE — DISCHARGE INSTR - OTHER ORDERS
Wound Care Plan:   1-Apply Remedy silicone cream/hydraguard lotion to bilateral heels twice daily for skin protection/hydration.  2-Elevate heels off of bed/chair surface to offload pressure.  3-Offloading air cushion in chair when out of bed.  4-Apply lotion to body daily and as needed.  5-Turn and reposition every 2 hours while in bed and weight shift frequently while in the chair to re-distribute pressure on skin.    6-Sacrum/buttocks--cleanse with soap and water, pat dry.  Apply Triad paste three times daily and as needed with incontinence care.  7-P500 Low air-loss mattress.  8-Groin incisions per vascular surgery team.  9-Right foot per podiatry team.    Follow-up at the [facility]--[phone].

## 2024-07-22 NOTE — ASSESSMENT & PLAN NOTE
Blood pressures are stable.  Continue clonidine patch and amlodipine   Labetalol changed to metoprolol by cardiology

## 2024-07-22 NOTE — ASSESSMENT & PLAN NOTE
Baseline creatinine 1.2-1.8.  Had kidney injury at [facility].  Nephrology following.  Renal function stable now.    Results from last 7 days   Lab Units 07/22/24  0541 07/20/24  0551 07/19/24  0518 07/18/24  0537 07/17/24  0512 07/16/24  1738   BUN mg/dL 21 19 20 22 31* 35*   CREATININE mg/dL 1.49* 1.49* 1.53* 1.53* 1.79* 2.19*   EGFR ml/min/1.73sq m 33 33 32 32 26 21

## 2024-07-22 NOTE — PLAN OF CARE
Problem: PAIN - ADULT  Goal: Verbalizes/displays adequate comfort level or baseline comfort level  Description: Interventions:  - Encourage patient to monitor pain and request assistance  - Assess pain using appropriate pain scale  - Administer analgesics based on type and severity of pain and evaluate response  - Implement non-pharmacological measures as appropriate and evaluate response  - Consider cultural and social influences on pain and pain management  - Notify physician/advanced practitioner if interventions unsuccessful or patient reports new pain  Outcome: Progressing     Problem: INFECTION - ADULT  Goal: Absence or prevention of progression during hospitalization  Description: INTERVENTIONS:  - Assess and monitor for signs and symptoms of infection  - Monitor lab/diagnostic results  - Monitor all insertion sites, i.e. indwelling lines, tubes, and drains  - Monitor endotracheal if appropriate and nasal secretions for changes in amount and color  - [place] appropriate cooling/warming therapies per order  - Administer medications as ordered  - Instruct and encourage patient and family to use good hand hygiene technique  - Identify and instruct in appropriate isolation precautions for identified infection/condition  Outcome: Progressing     Problem: SAFETY ADULT  Goal: Patient will remain free of falls  Description: INTERVENTIONS:  - Educate patient/family on patient safety including physical limitations  - Instruct patient to call for assistance with activity   - Consult OT/PT to assist with strengthening/mobility   - Keep Call bell within reach  - Keep bed low and locked with side rails adjusted as appropriate  - Keep care items and personal belongings within reach  - Initiate and maintain comfort rounds  - Make Fall Risk Sign visible to staff  - Offer Toileting every 2 Hours, in advance of need  - Initiate/Maintain bed alarm  - Apply yellow socks and bracelet for high fall risk patients  - Consider  moving patient to room near nurses station  Outcome: Progressing  Goal: Maintain or return to baseline ADL function  Description: INTERVENTIONS:  -  Assess patient's ability to carry out ADLs; assess patient's baseline for ADL function and identify physical deficits which impact ability to perform ADLs (bathing, care of mouth/teeth, toileting, grooming, dressing, etc.)  - Assess/evaluate cause of self-care deficits   - Assess range of motion  - Assess patient's mobility; develop plan if impaired  - Assess patient's need for assistive devices and provide as appropriate  - Encourage maximum independence but intervene and supervise when necessary  - Involve family in performance of ADLs  - Assess for home care needs following discharge   - Consider OT consult to assist with ADL evaluation and planning for discharge  - Provide patient education as appropriate  Outcome: Progressing  Goal: Maintains/Returns to pre admission functional level  Description: INTERVENTIONS:  - Perform AM-PAC 6 Click Basic Mobility/ Daily Activity assessment daily.  - Set and communicate daily mobility goal to care team and patient/family/caregiver.   - Collaborate with rehabilitation services on mobility goals if consulted  - Stand patient 3 times a day  - Ambulate patient 3 times a day  - Out of bed to chair 3 times a day   - Out of bed for meals 3 times a day  - Out of bed for toileting  - Record patient progress and toleration of activity level   Outcome: Progressing     Problem: DISCHARGE PLANNING  Goal: Discharge to home or other facility with appropriate resources  Description: INTERVENTIONS:  - Identify barriers to discharge w/patient and caregiver  - Arrange for needed discharge resources and transportation as appropriate  - Identify discharge learning needs (meds, wound care, etc.)  - Arrange for interpretive services to assist at discharge as needed  - Refer to Case Management Department for coordinating discharge planning if the  patient needs post-hospital services based on physician/advanced practitioner order or complex needs related to functional status, cognitive ability, or social support system  Outcome: Progressing     Problem: Knowledge Deficit  Goal: Patient/family/caregiver demonstrates understanding of disease process, treatment plan, medications, and discharge instructions  Description: Complete learning assessment and assess knowledge base.  Interventions:  - Provide teaching at level of understanding  - Provide teaching via preferred learning methods  Outcome: Progressing     Problem: CARDIOVASCULAR - ADULT  Goal: Maintains optimal cardiac output and hemodynamic stability  Description: INTERVENTIONS:  - Monitor I/O, vital signs and rhythm  - Monitor for S/S and trends of decreased cardiac output  - Administer and titrate ordered vasoactive medications to optimize hemodynamic stability  - Assess quality of pulses, skin color and temperature  - Assess for signs of decreased coronary artery perfusion  - Instruct patient to report change in severity of symptoms  Outcome: Progressing     Problem: METABOLIC, FLUID AND ELECTROLYTES - ADULT  Goal: Electrolytes maintained within normal limits  Description: INTERVENTIONS:  - Monitor labs and assess patient for signs and symptoms of electrolyte imbalances  - Administer electrolyte replacement as ordered  - Monitor response to electrolyte replacements, including repeat lab results as appropriate  - Instruct patient on fluid and nutrition as appropriate  Outcome: Progressing  Goal: Fluid balance maintained  Description: INTERVENTIONS:  - Monitor labs   - Monitor I/O and WT  - Instruct patient on fluid and nutrition as appropriate  - Assess for signs & symptoms of volume excess or deficit  Outcome: Progressing  Goal: Glucose maintained within target range  Description: INTERVENTIONS:  - Monitor Blood Glucose as ordered  - Assess for signs and symptoms of hyperglycemia and hypoglycemia  -  Administer ordered medications to maintain glucose within target range  - Assess nutritional intake and initiate nutrition service referral as needed  Outcome: Progressing     Problem: HEMATOLOGIC - ADULT  Goal: Maintains hematologic stability  Description: INTERVENTIONS  - Assess for signs and symptoms of bleeding or hemorrhage  - Monitor labs  - Administer supportive blood products/factors as ordered and appropriate  Outcome: Progressing     Problem: MUSCULOSKELETAL - ADULT  Goal: Maintain or return mobility to safest level of function  Description: INTERVENTIONS:  - Assess patient's ability to carry out ADLs; assess patient's baseline for ADL function and identify physical deficits which impact ability to perform ADLs (bathing, care of mouth/teeth, toileting, grooming, dressing, etc.)  - Assess/evaluate cause of self-care deficits   - Assess range of motion  - Assess patient's mobility  - Assess patient's need for assistive devices and provide as appropriate  - Encourage maximum independence but intervene and supervise when necessary  - Involve family in performance of ADLs  - Assess for home care needs following discharge   - Consider OT consult to assist with ADL evaluation and planning for discharge  - Provide patient education as appropriate  Outcome: Progressing  Goal: Maintain proper alignment of affected body part  Description: INTERVENTIONS:  - Support, maintain and protect limb and body alignment  - Provide patient/ family with appropriate education  Outcome: Progressing     Problem: Prexisting or High Potential for Compromised Skin Integrity  Goal: Skin integrity is maintained or improved  Description: INTERVENTIONS:  - Identify patients at risk for skin breakdown  - Assess and monitor skin integrity  - Assess and monitor nutrition and hydration status  - Monitor labs   - Assess for incontinence   - Turn and reposition patient  - Assist with mobility/ambulation  - Relieve pressure over bony  prominences  - Avoid friction and shearing  - Provide appropriate hygiene as needed including keeping skin clean and dry  - Evaluate need for skin moisturizer/barrier cream  - Collaborate with interdisciplinary team   - Patient/family teaching  - Consider wound care consult   Outcome: Progressing

## 2024-07-22 NOTE — PLAN OF CARE
Problem: PHYSICAL THERAPY ADULT  Goal: Performs mobility at highest level of function for planned discharge setting.  See evaluation for individualized goals.  Description: Treatment/Interventions: Functional transfer training, LE strengthening/ROM, Therapeutic exercise, Patient/family training, Equipment eval/education, Bed mobility, Cognitive reorientation, Gait training, Compensatory technique education, Continued evaluation, Spoke to nursing, OT          See flowsheet documentation for full assessment, interventions and recommendations.  Note: Prognosis: Fair  Problem List: Pain, Decreased skin integrity, Decreased mobility  Assessment: Anamaria Parnell is a 79 y.o. female admitted to [facility] on 7/19/2024 for Gangrene (HCC). Pending vascular surgery intervention. PT was consulted and pt was seen on 7/22/2024 for mobility assessment and d/c planning. Pt presents w high fall risk, multiple lines, subacute pain R foot, wbs per podiatry. Since onset of foot pain x1 month, pt has been non ambulatory relying electric wc via stand piv transf. Pt is currently functioning at a mod I- S for sit<>stand transfers, S for stand piv transf. Could readjust feet/take steps during stand piv transf however did not attempt to assess true ambulation dt complaints of foot pain. Noted pt maintain WB to R heel only; unable to fully bear weight on R forefoot dt pain. However pt was unable to transfer or maintain static standing w RW while fully NWB R dt her reports of pain and seemingly weakness/unsteadiness in SLS. Pt will benefit from continued skilled IP PT to address the above mentioned impairments in order to maximize recovery and increase functional independence when completing mobility and ADLs. The patient's AM-PAC Basic Mobility Inpatient Short Form Raw Score is 19. A Raw score of greater than 16 suggests the patient may benefit from discharge to home.  Barriers to Discharge: None, Decreased caregiver support (pt may need  increased support post operatively)     Rehab Resource Intensity Level, PT: (S) No post-acute rehabilitation needs (will continue to monitor post operatively)    See flowsheet documentation for full assessment.

## 2024-07-22 NOTE — PHYSICAL THERAPY NOTE
PHYSICAL THERAPY EVALUATION          Patient Name: Anamaria Parnell  Today's Date: 7/22/2024 07/22/24 0950   PT Last Visit   PT Visit Date 07/22/24   Note Type   Note type Evaluation   Pain Assessment   Pain Assessment Tool 0-10   Pain Score 1   Pain Location/Orientation Orientation: Right;Location: Foot   Hospital Pain Intervention(s) Emotional support;Rest   Restrictions/Precautions   Weight Bearing Precautions Per Order Yes   RLE Weight Bearing Per Order WBAT   Braces or Orthoses Other (Comment)  (surgical shoe RLE)   Other Precautions Chair Alarm;Bed Alarm;Multiple lines;Fall Risk;Pain   Home Living   Type of Home House   Home Layout One level;Ramped entrance   Bathroom Equipment Shower chair;Commode   Home Equipment Walker;Cane;Wheelchair-electric   Additional Comments sleeps in lift chair recliner   Prior Function   Level of [place] Independent with ADLs;Independent with functional mobility;Independent with IADLS;Needs assistance with IADLS   Lives With Son   Receives Help From Family   IADLs Independent with meal prep;Family/Friend/Other provides transportation;Family/Friend/Other provides medication management   Falls in the last 6 months 0   Comments reports since onset of foot pain about x1 month ago, non ambulatory. was prev using cane to walk. now stand piv transf to  w/o an AD. son works nights. his gf is also there to help sometimes.   General   Additional Pertinent History pt admitted 7/19/24 for gangrene. transf from [facility] (7/16-19) for vascular surgery. scheduled for vascular surgery 7/24. up and oob orders. wbat RLE per podiatry   Cognition   Overall Cognitive Status WFL   Arousal/Participation Cooperative   Attention Within functional limits   Orientation Level Oriented to person;Oriented to place;Oriented to time  (general to time, can self correct)   Following Commands Follows one step commands without difficulty   Comments pleasant   Subjective   Subjective  "\"I hate walkers\"   Coordination   Sensation X  (numb bl feet/ toes)   Light Touch   RLE Light Touch Grossly intact   LLE Light Touch Grossly intact   Transfers   Sit to Stand 6  Modified independent   Additional items Armrests   Stand to Sit 5  Supervision   Additional items Armrests   Stand pivot 5  Supervision   Additional items Armrests;Increased time required   Additional Comments noted pt pivots by turning body away from surface to make 180 degree turn instead of directly towards surface   Balance   Static Standing Fair +  (w external support)   Dynamic Standing Fair  (w external support)   Endurance Deficit   Endurance Deficit No   Activity Tolerance   Activity Tolerance Patient tolerated treatment well;Other (Comment)  (pain limiting wb to RLE)   Medical Staff Made Aware OT   Nurse Made Aware Kadi RN   Assessment   Prognosis Fair   Problem List Pain;Decreased skin integrity;Decreased mobility   Assessment Anamaria Parnell is a 79 y.o. female admitted to [facility] on 7/19/2024 for Gangrene (HCC). Pending vascular surgery intervention. PT was consulted and pt was seen on 7/22/2024 for mobility assessment and d/c planning. Pt presents w high fall risk, multiple lines, subacute pain R foot, wbs per podiatry. Since onset of foot pain x1 month, pt has been non ambulatory relying electric wc via stand piv transf. Pt is currently functioning at a mod I- S for sit<>stand transfers, S for stand piv transf. Could readjust feet/take steps during stand piv transf however did not attempt to assess true ambulation dt complaints of foot pain. Noted pt maintain WB to R heel only; unable to fully bear weight on R forefoot dt pain. However pt was unable to transfer or maintain static standing w RW while fully NWB R dt her reports of pain and seemingly weakness/unsteadiness in SLS. Pt will benefit from continued skilled IP PT to address the above mentioned impairments in order to maximize recovery and increase functional independence " when completing mobility and ADLs. The patient's AM-PAC Basic Mobility Inpatient Short Form Raw Score is 19. A Raw score of greater than 16 suggests the patient may benefit from discharge to home.   Barriers to Discharge None;Decreased caregiver support  (pt may need increased support post operatively)   Goals   Patient Goals be able to walk again   STG Expiration Date 08/05/24   Short Term Goal #1 1) Pt will perform bed mobility with Jair demonstrating appropriate technique 100% of the time in order to improve function. 2) Perform all transfers with Jair demonstrating safe and appropriate technique 100% of the time in order to improve ability to negotiate safely in home environment. 3) Amb with least restrictive AD > 20'x1 with S in order to demonstrate ability to negotiate in home environment. 4) Improve overall strength and balance 1/2 grade in order to optimize ability to perform functional tasks and reduce fall risk. 5) PT for ongoing patient and family/caregiver education, DME needs and d/c planning in order to promote highest level of function in least restrictive environment.   Plan   Treatment/Interventions Functional transfer training;LE strengthening/ROM;Therapeutic exercise;Patient/family training;Equipment eval/education;Bed mobility;Cognitive reorientation;Gait training;Compensatory technique education;Continued evaluation;Spoke to nursing;OT   PT Frequency 1-2x/wk   Discharge Recommendation   Rehab Resource Intensity Level, PT (S)  No post-acute rehabilitation needs  (will continue to monitor post operatively)   AM-PAC Basic Mobility Inpatient   Turning in Flat Bed Without Bedrails 4   Lying on Back to Sitting on Edge of Flat Bed Without Bedrails 4   Moving Bed to Chair 3   Standing Up From Chair Using Arms 4   Walk in Room 3   Climb 3-5 Stairs With Railing 1   Basic Mobility Inpatient Raw Score 19   Basic Mobility Standardized Score 42.48   [facility] Highest Level Of Mobility   [facility] Goal 6: Walk  10 steps or more   -HLM Achieved 5: Stand (1 or more minutes)   End of Consult   Patient Position at End of Consult Bedside chair;Bed/Chair alarm activated;All needs within reach   History: co - morbidities including age, social background, use of assistive device, current experience including fall risk, multiple lines, wbs  Exam: impairments in systems including multiple body structures involved; neuromuscular (balance, transfers, sensation), cognition; activity limitations (difficulties executing an action, non amb); participation restrictions (problems associated w involvement in life situations), AM-PAC  Clinical: unstable/unpredictable  Complexity:high    Alicia Abbott, PT

## 2024-07-22 NOTE — TREATMENT PLAN
Reviewed right foot x-ray, final read: cortical destruction along the ventral aspect of the first distal phalanx worrisome for acute osteomyelitis.     Compared to prior right foot radiographs on 4/18/2024 there is progression of cortical destruction and osteopenia along the hallux distal phalanx.  Per my personal read of the right foot radiographs taken today, the suspected pocket of air is likely dead space given the clinical presentation of dry gangrene of the right hallux and no acute clinical signs of infection on examination today other than mild malodor.  Patient's right hallux gangrene is currently stable and podiatry will continue to follow for local wound care.  There are no podiatric surgical interventions planned at this time.  Will follow-up results of vascular intervention tentatively planned for 7/24/2024.    Recommend continuing IV antibiotics per primary team as right hallux gangrene remains as a potential source for possible infection.

## 2024-07-22 NOTE — PROGRESS NOTES
[facility]  Progress Note  Name: Anamaria Parnell I  MRN: [id number]  Unit/Bed#: E5 -01 I Date of Admission: 7/19/2024   Date of Service: 7/22/2024 I Hospital Day: 3    Assessment & Plan   * Gangrene (HCC)  Assessment & Plan  History of PAD CAD diabetes and hypertension presented to [facility] for right great toe gangrene transferred here for vascular bypass.  On DAPT.  Vascular planning bypass on Wednesday.  X-ray with concerning findings for acute osteomyelitis  Podiatry following without any plans for amputation at this time.  Continue cefazolin    Coronary artery disease of native artery of native heart with stable angina pectoris ([facility])  Assessment & Plan  CAD with history of PCI.  No chest pain.  On DAPT  Seen in consultation with cardiology for preoperative risk assessment    Primary hypertension  Assessment & Plan  Blood pressures are stable.  Continue clonidine patch and amlodipine   Labetalol changed to metoprolol by cardiology    Type 2 diabetes mellitus, without long-term current use of insulin ([facility])  Assessment & Plan  Lab Results   Component Value Date    HGBA1C 5.9 (H) 07/09/2024     Recent Labs     07/21/24  2108 07/22/24  0705 07/22/24  1110 07/22/24  1606   POCGLU 118 138 121 125     Prior to admission on Tradjenta.  Currently on sliding scale insulin.      Depression, recurrent ([facility])  Assessment & Plan  Mood stable on escitalopram    Stage 4 chronic kidney disease ([facility])  Assessment & Plan  Baseline creatinine 1.2-1.8.  Had kidney injury at [facility].  Nephrology following.  Renal function stable now.    Results from last 7 days   Lab Units 07/22/24  0541 07/20/24  0551 07/19/24  0518 07/18/24  0537 07/17/24  0512 07/16/24  1738   BUN mg/dL 21 19 20 22 31* 35*   CREATININE mg/dL 1.49* 1.49* 1.53* 1.53* 1.79* 2.19*   EGFR ml/min/1.73sq m 33 33 32 32 26 21       VTE Pharmacologic Prophylaxis:   Moderate Risk (Score 3-4) - Pharmacological DVT Prophylaxis Ordered:  "heparin.    Mobility:  Basic Mobility Inpatient Raw Score: 19  JH-HLM Goal: 6: Walk 10 steps or more  JH-HLM Achieved: 4: Move to chair/commode  JH-HLM Goal NOT achieved. Continue with multidisciplinary rounding and encourage appropriate mobility to improve upon JH-HLM goals.    Patient Centered Rounds: I have performed bedside rounds with nursing staff today.  Discussions with Specialists or Other Care Team Provider: Case management and vascular surgery    Education and Discussions with Family / Patient:     Time Spent for Care:   This time was spent on one or more of the following: performing physical exam; counseling and coordination of care; obtaining or reviewing history; documenting in the medical record; reviewing/ordering tests, medications or procedures; communicating with other healthcare professionals and discussing with patient's family/caregivers.    Current Length of Stay: 3 day(s)  Current Patient Status: Inpatient   Certification Statement: The patient will continue to require additional inpatient hospital stay due to vascular surgery on Wednesday  Discharge Plan: Anticipate discharge in >72 hrs to discharge location to be determined pending rehab evaluations.    Code Status: Level 3 - DNAR and DNI      Subjective:   Patient seen and examined.  No complaints, intermittent foot pain    Objective:   Vitals: Blood pressure 154/62, pulse 82, temperature 98.4 °F (36.9 °C), temperature source Oral, resp. rate 16, height 4' 10\" (1.473 m), weight 65.3 kg (144 lb), SpO2 96%.    Intake/Output Summary (Last 24 hours) at 7/22/2024 1715  Last data filed at 7/22/2024 0806  Gross per 24 hour   Intake 120 ml   Output 400 ml   Net -280 ml       Physical Exam  Vitals reviewed.   Constitutional:       General: She is not in acute distress.     Appearance: Normal appearance.   HENT:      Head: Atraumatic.   Eyes:      Extraocular Movements: Extraocular movements intact.   Cardiovascular:      Rate and Rhythm: Regular " rhythm.   Pulmonary:      Breath sounds: No wheezing.   Abdominal:      General: Bowel sounds are normal.      Palpations: Abdomen is soft.      Tenderness: There is no guarding or rebound.   Musculoskeletal:         General: No swelling.   Skin:     General: Skin is warm.   Neurological:      Mental Status: She is alert and oriented to person, place, and time. Mental status is at baseline.   Psychiatric:         Mood and Affect: Mood normal.       Additional Data:   Labs:  Results from last 7 days   Lab Units 07/22/24  0541 07/20/24  0551 07/19/24  0518 07/17/24  0156 07/16/24  1738   WBC Thousand/uL 13.02* 11.18* 13.35*   < > 14.08*   HEMOGLOBIN g/dL 10.6* 9.9* 10.8*   < > 11.1*   PLATELETS Thousands/uL 351 343 357   < > 397*   MCV fL 89 89 88   < > 88   INR   --   --   --   --  1.17    < > = values in this interval not displayed.     Results from last 7 days   Lab Units 07/22/24  0541 07/20/24  0551 07/19/24  0518 07/17/24  0512 07/16/24  1738   SODIUM mmol/L 138 139 140   < > 140   POTASSIUM mmol/L 4.1 3.9 3.8   < > 4.4   CHLORIDE mmol/L 106 108 108   < > 104   CO2 mmol/L 24 19* 20*   < > 21   ANION GAP mmol/L 8 12 12   < > 15*   BUN mg/dL 21 19 20   < > 35*   CREATININE mg/dL 1.49* 1.49* 1.53*   < > 2.19*   CALCIUM mg/dL 8.3* 8.0* 8.1*   < > 8.8   ALBUMIN g/dL  --   --   --   --  3.8   TOTAL BILIRUBIN mg/dL  --   --   --   --  0.38   ALK PHOS U/L  --   --   --   --  96   ALT U/L  --   --   --   --  4*   AST U/L  --   --   --   --  7*   EGFR ml/min/1.73sq m 33 33 32   < > 21   GLUCOSE RANDOM mg/dL 118 84 100   < > 106    < > = values in this interval not displayed.                          Results from last 7 days   Lab Units 07/22/24  1606 07/22/24  1110 07/22/24  0705 07/21/24  2108 07/21/24  1614 07/21/24  1108 07/21/24  0707 07/20/24  2045 07/20/24  1613 07/20/24  1117 07/20/24  0733 07/19/24 2018   POC GLUCOSE mg/dl 125 121 138 118 113 95 98 148* 142* 135 104 109             * I Have Reviewed All Lab Data  Listed Above.    Recent cultures:   Results from last 7 days   Lab Units 07/17/24  0220 07/17/24  0156   BLOOD CULTURE   --  No Growth After 5 Days.   URINE CULTURE  >100,000 cfu/ml - Lactobacillus gasseri Lactobacillus species*  <10,000 cfu/ml Micrococcus luteus*  <10,000 cfu/ml - Lactobacillus rhamnosus Lactobacillus species*  --                  Lines/Drains:  Invasive Devices       Peripheral Intravenous Line  Duration             Peripheral IV 07/21/24 Distal;Dorsal (posterior);Right Forearm 1 day                          Telemetry:      Imaging:  Imaging Reports Reviewed Today Include:   XR foot 3+ vw right    Result Date: 7/22/2024  Impression: Cortical destruction along the ventral aspect of the first distal phalanx worrisome for acute osteomyelitis. Large air within the overlying soft tissues. The study was marked in EPIC for immediate notification. Workstation performed: [id number]       Scheduled Meds:  Current Facility-Administered Medications   Medication Dose Route Frequency Provider Last Rate    acetaminophen  975 mg Oral Q8H [facility] Kimberly Geiger MD      allopurinol  100 mg Oral Daily Kimberly Geiger MD      amLODIPine  5 mg Oral Daily GRACIELA Alvares      aspirin  81 mg Oral Daily Kimberly Geiger MD      cefazolin  1,000 mg Intravenous Q12H Kimberly Geiger MD 1,000 mg (07/22/24 0828)    [START ON 7/23/2024] cloNIDine  1 patch Transdermal Weekly Kimberly Geiger MD      escitalopram  20 mg Oral Daily Kimberly Geiger MD      heparin (porcine)  5,000 Units Subcutaneous Q8H [facility] Kimberly Geiger MD      HYDROmorphone  0.2 mg Intravenous Q6H PRN Kimberly Geiger MD      insulin lispro  1-5 Units Subcutaneous TID AC Kimberly Geiger MD      lidocaine  1 patch Topical Daily Kimberly Geiger MD      metoprolol succinate  100 mg Oral Daily Timothy Rey DO      ondansetron  4 mg Intravenous Q6H PRN Kimberly Geiger MD      oxyCODONE  5 mg Oral Q6H PRN Kimberly Geiger MD      pravastatin  80 mg Oral Daily With Dinner Kimberly Geiger MD      sodium bicarbonate  650 mg  Oral BID after meals GRACIELA Alvares      ticagrelor  90 mg Oral Q12H [facility] Kimberly Geiger MD      traZODone  50 mg Oral HS Kimberly Geiger MD         Today, Patient Was Seen By: Akira Flores DO    ** Please Note: Dictation voice to text software may have been used in the creation of this document. **

## 2024-07-22 NOTE — WOUND OSTOMY CARE
Consult Note - Wound   Anamaria Parnell 79 y.o. female MRN: [id number]  Unit/Bed#: E5 -01 Encounter: [id number]      History and Present Illness:  79 year old female presented to the hospital with PAD and right great toe gangrene.  Patient transferred to [facility] from [facility] for revascularization.  Patient's history significant for CVA, CKD, DM, HTN, former smoker.    Tentative plan for right CFA endarterectomy with stenting, as well as right femoral to tibial bypass on 7/24/24.    Wound care team consulted for wound on bilateral buttocks.    Assessment Findings:   Patient agreeable to assessment.  She is sitting up in the chair, able to stand with standby assist and walker.  Able to weight shift and turn in bed independently.  Continent of bowel and bladder.  Bilateral heels intact with blanchable erythema.  Nutrition team following.  Present on admission wound to right buttock--presents as healing pressure injury of unknown original stage with friction component.  Pink and white fibrinous tissue.  No drainage.  Miley-wound intact with maceration, blanchable erythema, and hyperpigmentation.   Left buttock--intact, recently healed/epithelialized with scabbing and blanchable hyperpigmentation.  Right great toe--black, dry, open to air per podiatry and vascular teams.    See flowsheet for wound details.    Wound Care Plan:   1-Apply silicone bordered foam dressings to bilateral heels for prevention.  Fabián with P.  Peel back for skin assessments at least daily and re-apply.  Change dressings every 3 days and as needed.  2-Elevate heels off of bed/chair surface to offload pressure.  3-Offloading air cushion in chair when out of bed.  4-Apply moisturizing skin cream to body daily and as needed.  5-Encourage/remind patient to turn and reposition every 2 hours while in bed and weight shift frequently while in the chair to re-distribute pressure on skin.  Provide assistance as  needed.  6-Sacrum/buttocks--cleanse with soap and water, pat dry.  Apply silicone bordered foam dressing for treatment.  Change dressing every other day and as needed with soilage.    Wound care team to follow.  Plan of care reviewed with primary RN.    Wound 07/16/24 Pressure Injury Buttocks Left (Active)   Wound Image   07/22/24 1311   Wound Description Dry;Brown 07/22/24 1311   Miley-wound Assessment Hyperpigmented;Intact 07/22/24 1311   Wound Length (cm) 0 cm 07/22/24 1311   Wound Width (cm) 0 cm 07/22/24 1311   Wound Depth (cm) 0 cm 07/22/24 1311   Wound Surface Area (cm^2) 0 cm^2 07/22/24 1311   Wound Volume (cm^3) 0 cm^3 07/22/24 1311   Calculated Wound Volume (cm^3) 0 cm^3 07/22/24 1311   Drainage Amount None 07/22/24 1311   Dressing Foam, Silicon (eg. Allevyn, etc) 07/22/24 1311   Dressing Changed Changed 07/22/24 1311   Patient Tolerance Tolerated well 07/22/24 1311   Dressing Status Clean;Dry;Intact 07/22/24 1311       Wound 07/19/24 Pressure Injury Buttocks Right (Active)   Wound Image   07/22/24 1311   Wound Description Whitetail 07/22/24 1311   Miley-wound Assessment Maceration;Hyperpigmented 07/22/24 1311   Wound Length (cm) 0.7 cm 07/22/24 1311   Wound Width (cm) 0.5 cm 07/22/24 1311   Wound Depth (cm) 0.1 cm 07/22/24 1311   Wound Surface Area (cm^2) 0.35 cm^2 07/22/24 1311   Wound Volume (cm^3) 0.035 cm^3 07/22/24 1311   Calculated Wound Volume (cm^3) 0.04 cm^3 07/22/24 1311   Drainage Amount None 07/22/24 1311   Treatments Cleansed 07/22/24 1311   Dressing Foam, Silicon (eg. Allevyn, etc) 07/22/24 1311   Dressing Changed Changed 07/22/24 1311   Patient Tolerance Tolerated well 07/22/24 1311   Dressing Status Clean;Intact;Dry 07/22/24 1311       Frances Dunne RN, BSN, CWON

## 2024-07-22 NOTE — ASSESSMENT & PLAN NOTE
CAD with history of PCI.  No chest pain.  On DAPT  Seen in consultation with cardiology for preoperative risk assessment

## 2024-07-22 NOTE — ASSESSMENT & PLAN NOTE
History of PAD CAD diabetes and hypertension presented to [facility] for right great toe gangrene transferred here for vascular bypass.  On DAPT.  Vascular planning bypass on Wednesday.  X-ray with concerning findings for acute osteomyelitis  Podiatry following without any plans for amputation at this time.  Continue cefazolin

## 2024-07-22 NOTE — PROGRESS NOTES
NEPHROLOGY PROGRESS NOTE   Anamaria Parnell 79 y.o. female MRN: [id number]  Unit/Bed#: E5 -01 Encounter: [id number]      ASSESSMENT & PLAN:  #1 RICARDO on top of CKD stage IIIb/IV.  Baseline creatinine around 1.4-1.7, follow-up with Dr. NAYE Longoria as an outpatient.  RICARDO in the setting of sepsis with soft tissue infection, creatinine on admission 2.1, kidney function improved with creatinine back to baseline at 1.49 this morning  Continue current regimen, keep holding losartan in anticipation of vascular procedure on 7/24.  Patient will need IV fluid pre and postop per protocol.    #2 right hallux gangrene on the patient with history of PAD, vascular surgery on board, plan for right femoral endarterectomy on 7/24    3 metabolic acidosis in the setting of CKD, improving with sodium bicarbonate tablets    #4 hypertension, blood pressure is stable, keep holding ARB in anticipation of vascular procedure    5 anemia, suspected secondary of chronic kidney disease, hemoglobin low but is stable at 10.6 this morning.        SUBJECTIVE:  Patient seen and examined, denies significant complaints other than right foot pain denies any chest pain, no shortness of breath, no nausea, no vomiting, no abdominal pain    OBJECTIVE:  Current Weight: Weight - Scale: 65.3 kg (144 lb)  Vitals:    07/22/24 0706   BP: 117/72   Pulse: 88   Resp: 18   Temp: 98.1 °F (36.7 °C)   SpO2: 96%       Intake/Output Summary (Last 24 hours) at 7/22/2024 1009  Last data filed at 7/22/2024 0806  Gross per 24 hour   Intake 460 ml   Output 400 ml   Net 60 ml       General: conscious, cooperative, in not acute distress  Eyes: conjunctivae pink, anicteric sclerae  ENT: lips and mucous membranes moist  Neck: supple, no JVD  Chest: clear breath sounds bilateral, no crackles, ronchus or wheezings  CVS: distinct S1 & S2, normal rate, regular rhythm  Abdomen: soft, non-tender, non-distended, normoactive bowel sounds  Extremities: no edema of both legs, right hallux  and first toe gangrene  Skin: no rash  Neuro: awake, alert, oriented      Medications:    Current Facility-Administered Medications:     acetaminophen (TYLENOL) tablet 975 mg, 975 mg, Oral, Q8H [facility], Kimberly Geiger MD, 975 mg at 07/22/24 0539    allopurinol (ZYLOPRIM) tablet 100 mg, 100 mg, Oral, Daily, Kimberly Geiger MD, 100 mg at 07/22/24 0828    amLODIPine (NORVASC) tablet 5 mg, 5 mg, Oral, Daily, LAUREN AlvaresNP, 5 mg at 07/22/24 0828    aspirin chewable tablet 81 mg, 81 mg, Oral, Daily, Kimberly Geiger MD, 81 mg at 07/22/24 0828    ceFAZolin (ANCEF) IVPB (premix in dextrose) 1,000 mg 50 mL, 1,000 mg, Intravenous, Q12H, Kimberly Geiger MD, Last Rate: 100 mL/hr at 07/22/24 0828, 1,000 mg at 07/22/24 0828    [START ON 7/23/2024] cloNIDine (CATAPRES-TTS-3) 0.3 mg/24 hr TD weekly patch, 1 patch, Transdermal, Weekly, Kimberly Geiger MD    escitalopram (LEXAPRO) tablet 20 mg, 20 mg, Oral, Daily, Kimberly Geiger MD, 20 mg at 07/22/24 0828    heparin (porcine) subcutaneous injection 5,000 Units, 5,000 Units, Subcutaneous, Q8H [facility], Kimberly Geiger MD, 5,000 Units at 07/22/24 0539    HYDROmorphone HCl (DILAUDID) injection 0.2 mg, 0.2 mg, Intravenous, Q6H PRN, Kimberly Geiger MD    insulin lispro (HumALOG/ADMELOG) 100 units/mL subcutaneous injection 1-5 Units, 1-5 Units, Subcutaneous, TID AC **AND** Fingerstick Glucose (POCT), , , TID AC, Kimberly Geiger MD    labetalol (NORMODYNE) tablet 300 mg, 300 mg, Oral, Q12H [facility], Kimberly Geiger MD, 300 mg at 07/22/24 0828    lidocaine (LIDODERM) 5 % patch 1 patch, 1 patch, Topical, Daily, Kimberly Geiger MD, 1 patch at 07/20/24 0827    ondansetron (ZOFRAN) injection 4 mg, 4 mg, Intravenous, Q6H PRN, Kimberly Geiger MD    oxyCODONE (ROXICODONE) IR tablet 5 mg, 5 mg, Oral, Q6H PRN, Kimberly Geiger MD, 5 mg at 07/22/24 0827    pravastatin (PRAVACHOL) tablet 80 mg, 80 mg, Oral, Daily With Dinner, Kimberly Geiger MD, 80 mg at 07/21/24 1729    sodium bicarbonate tablet 650 mg, 650 mg, Oral, BID after meals, GRACIELA Alvares, 650 mg at 07/22/24  "0828    ticagrelor (BRILINTA) tablet 90 mg, 90 mg, [facility], [facility], 90 mg at 07/22/24 0827    traZODone (DESYREL) tablet 50 mg, 50 mg, Oral, , Kimberly Geiger MD, 50 mg at 07/21/24 2110    Invasive Devices:        Lab Results:   Results from last 7 days   Lab Units 07/22/24  0541 07/20/24  0551 07/19/24  0518 07/17/24  0156 07/16/24  1738   WBC Thousand/uL 13.02* 11.18* 13.35*   < > 14.08*   HEMOGLOBIN g/dL 10.6* 9.9* 10.8*   < > 11.1*   HEMATOCRIT % 32.2* 30.4* 33.5*   < > 33.5*   PLATELETS Thousands/uL 351 343 357   < > 397*   SODIUM mmol/L 138 139 140   < > 140   POTASSIUM mmol/L 4.1 3.9 3.8   < > 4.4   CHLORIDE mmol/L 106 108 108   < > 104   CO2 mmol/L 24 19* 20*   < > 21   BUN mg/dL 21 19 20   < > 35*   CREATININE mg/dL 1.49* 1.49* 1.53*   < > 2.19*   CALCIUM mg/dL 8.3* 8.0* 8.1*   < > 8.8   ALK PHOS U/L  --   --   --   --  96   ALT U/L  --   --   --   --  4*   AST U/L  --   --   --   --  7*    < > = values in this interval not displayed.       Previous work up:  See previous notes      Portions of the record may have been created with voice recognition software. Occasional wrong word or \"sound a like\" substitutions may have occurred due to the inherent limitations of voice recognition software. Read the chart carefully and recognize, using context, where substitutions have occurred.If you have any questions, please contact the dictating provider.  "

## 2024-07-22 NOTE — PLAN OF CARE
Problem: OCCUPATIONAL THERAPY ADULT  Goal: Performs self-care activities at highest level of function for planned discharge setting.  See evaluation for individualized goals.  Description: Treatment Interventions: ADL retraining, Functional transfer training, UE strengthening/ROM, Endurance training, Cognitive reorientation, Patient/family training, Equipment evaluation/education, Compensatory technique education, Continued evaluation          See flowsheet documentation for full assessment, interventions and recommendations.   Note: Limitation: Decreased ADL status, Decreased UE strength, Decreased Safe judgement during ADL, Decreased cognition, Decreased endurance, Decreased high-level ADLs  Prognosis: Good  Assessment: Pt is a 78y/o female admitted to the hospital 2* R toe gangrene(x-monserrat from [facility]) for a revascularization of her R LE--R femoral endarterectomy with retrograde iliac intervention and femoral to BK popliteal bypass w/ GSV(7/24). Podiatry currently allowing WBAT R LE. Pt with PMH PVD, DM, CVA, CKD, HTN, spinal stenosis, L RTC repair, ankle fx, CAD--s/p stents. PTA pt states independence with her ADLs, transfers--i.e.stand-pivot, limited ambulation; states limited ambulation last 1-2months; sleeps in lift chair, neg falls; neg , neg home alone. During  initial eval, pt demonstrated deficits with her functional balance, functional mobility, ADL status, transfer safety, b/l UE strength, activity tolerance(currently fair=15-20mins), and questionable cognition(i.e.memory). Pt would benefit from continued OT tx for the above deficits.3-5xwk/1-2wks. The patient's raw score on the -PAC Daily Activity Inpatient Short Form is 19. A raw score of greater than or equal to 19 suggests the patient may benefit from discharge to home. Please refer to the recommendation of the Occupational Therapist for safe discharge planning.     Rehab Resource Intensity Level, OT: II (Moderate Resource Intensity)

## 2024-07-22 NOTE — PLAN OF CARE
Problem: PAIN - ADULT  Goal: Verbalizes/displays adequate comfort level or baseline comfort level  Description: Interventions:  - Encourage patient to monitor pain and request assistance  - Assess pain using appropriate pain scale  - Administer analgesics based on type and severity of pain and evaluate response  - Implement non-pharmacological measures as appropriate and evaluate response  - Consider cultural and social influences on pain and pain management  - Notify physician/advanced practitioner if interventions unsuccessful or patient reports new pain  7/22/2024 0734 by Sharon Ford RN  Outcome: Progressing  7/22/2024 0734 by Sharon Ford RN  Outcome: Progressing     Problem: INFECTION - ADULT  Goal: Absence or prevention of progression during hospitalization  Description: INTERVENTIONS:  - Assess and monitor for signs and symptoms of infection  - Monitor lab/diagnostic results  - Monitor all insertion sites, i.e. indwelling lines, tubes, and drains  - Monitor endotracheal if appropriate and nasal secretions for changes in amount and color  - [place] appropriate cooling/warming therapies per order  - Administer medications as ordered  - Instruct and encourage patient and family to use good hand hygiene technique  - Identify and instruct in appropriate isolation precautions for identified infection/condition  7/22/2024 0734 by Shaorn Ford RN  Outcome: Progressing  7/22/2024 0734 by Sharon Ford RN  Outcome: Progressing     Problem: SAFETY ADULT  Goal: Patient will remain free of falls  Description: INTERVENTIONS:  - Educate patient/family on patient safety including physical limitations  - Instruct patient to call for assistance with activity   - Consult OT/PT to assist with strengthening/mobility   - Keep Call bell within reach  - Keep bed low and locked with side rails adjusted as appropriate  - Keep care items and personal belongings within reach  - Initiate and maintain comfort  rounds  - Make Fall Risk Sign visible to staff  - Offer Toileting every 2 Hours, in advance of need  - Initiate/Maintain bed alarm  - Apply yellow socks and bracelet for high fall risk patients  - Consider moving patient to room near nurses station  7/22/2024 0734 by Sharon Ford RN  Outcome: Progressing  7/22/2024 0734 by Sharon Ford RN  Outcome: Progressing  Goal: Maintain or return to baseline ADL function  Description: INTERVENTIONS:  -  Assess patient's ability to carry out ADLs; assess patient's baseline for ADL function and identify physical deficits which impact ability to perform ADLs (bathing, care of mouth/teeth, toileting, grooming, dressing, etc.)  - Assess/evaluate cause of self-care deficits   - Assess range of motion  - Assess patient's mobility; develop plan if impaired  - Assess patient's need for assistive devices and provide as appropriate  - Encourage maximum independence but intervene and supervise when necessary  - Involve family in performance of ADLs  - Assess for home care needs following discharge   - Consider OT consult to assist with ADL evaluation and planning for discharge  - Provide patient education as appropriate  7/22/2024 0734 by Sharon Ford RN  Outcome: Progressing  7/22/2024 0734 by Sharon Ford RN  Outcome: Progressing  Goal: Maintains/Returns to pre admission functional level  Description: INTERVENTIONS:  - Perform AM-PAC 6 Click Basic Mobility/ Daily Activity assessment daily.  - Set and communicate daily mobility goal to care team and patient/family/caregiver.   - Collaborate with rehabilitation services on mobility goals if consulted  - Stand patient 3 times a day  - Ambulate patient 3 times a day  - Out of bed to chair 3 times a day   - Out of bed for meals 3 times a day  - Out of bed for toileting  - Record patient progress and toleration of activity level   7/22/2024 0734 by Sharon Ford RN  Outcome: Progressing  7/22/2024 0734 by Sharon  ZULEYMA Ford  Outcome: Progressing     Problem: DISCHARGE PLANNING  Goal: Discharge to home or other facility with appropriate resources  Description: INTERVENTIONS:  - Identify barriers to discharge w/patient and caregiver  - Arrange for needed discharge resources and transportation as appropriate  - Identify discharge learning needs (meds, wound care, etc.)  - Arrange for interpretive services to assist at discharge as needed  - Refer to Case Management Department for coordinating discharge planning if the patient needs post-hospital services based on physician/advanced practitioner order or complex needs related to functional status, cognitive ability, or social support system  7/22/2024 0734 by Sharon Ford RN  Outcome: Progressing  7/22/2024 0734 by Sharon Ford RN  Outcome: Progressing     Problem: Knowledge Deficit  Goal: Patient/family/caregiver demonstrates understanding of disease process, treatment plan, medications, and discharge instructions  Description: Complete learning assessment and assess knowledge base.  Interventions:  - Provide teaching at level of understanding  - Provide teaching via preferred learning methods  7/22/2024 0734 by Sharon Ford RN  Outcome: Progressing  7/22/2024 0734 by Sharon Ford RN  Outcome: Progressing     Problem: CARDIOVASCULAR - ADULT  Goal: Maintains optimal cardiac output and hemodynamic stability  Description: INTERVENTIONS:  - Monitor I/O, vital signs and rhythm  - Monitor for S/S and trends of decreased cardiac output  - Administer and titrate ordered vasoactive medications to optimize hemodynamic stability  - Assess quality of pulses, skin color and temperature  - Assess for signs of decreased coronary artery perfusion  - Instruct patient to report change in severity of symptoms  7/22/2024 0734 by Sharon Ford RN  Outcome: Progressing  7/22/2024 0734 by Sharon Ford RN  Outcome: Progressing     Problem: METABOLIC, FLUID AND  ELECTROLYTES - ADULT  Goal: Electrolytes maintained within normal limits  Description: INTERVENTIONS:  - Monitor labs and assess patient for signs and symptoms of electrolyte imbalances  - Administer electrolyte replacement as ordered  - Monitor response to electrolyte replacements, including repeat lab results as appropriate  - Instruct patient on fluid and nutrition as appropriate  7/22/2024 0734 by Sharon Ford RN  Outcome: Progressing  7/22/2024 0734 by Sharon Ford RN  Outcome: Progressing  Goal: Fluid balance maintained  Description: INTERVENTIONS:  - Monitor labs   - Monitor I/O and WT  - Instruct patient on fluid and nutrition as appropriate  - Assess for signs & symptoms of volume excess or deficit  7/22/2024 0734 by Sharon Ford RN  Outcome: Progressing  7/22/2024 0734 by Sharon Ford RN  Outcome: Progressing  Goal: Glucose maintained within target range  Description: INTERVENTIONS:  - Monitor Blood Glucose as ordered  - Assess for signs and symptoms of hyperglycemia and hypoglycemia  - Administer ordered medications to maintain glucose within target range  - Assess nutritional intake and initiate nutrition service referral as needed  7/22/2024 0734 by Sharon Ford RN  Outcome: Progressing  7/22/2024 0734 by Sharon Ford RN  Outcome: Progressing     Problem: HEMATOLOGIC - ADULT  Goal: Maintains hematologic stability  Description: INTERVENTIONS  - Assess for signs and symptoms of bleeding or hemorrhage  - Monitor labs  - Administer supportive blood products/factors as ordered and appropriate  7/22/2024 0734 by Sharon Ford RN  Outcome: Progressing  7/22/2024 0734 by Sharon Ford RN  Outcome: Progressing     Problem: MUSCULOSKELETAL - ADULT  Goal: Maintain or return mobility to safest level of function  Description: INTERVENTIONS:  - Assess patient's ability to carry out ADLs; assess patient's baseline for ADL function and identify physical deficits which impact  ability to perform ADLs (bathing, care of mouth/teeth, toileting, grooming, dressing, etc.)  - Assess/evaluate cause of self-care deficits   - Assess range of motion  - Assess patient's mobility  - Assess patient's need for assistive devices and provide as appropriate  - Encourage maximum independence but intervene and supervise when necessary  - Involve family in performance of ADLs  - Assess for home care needs following discharge   - Consider OT consult to assist with ADL evaluation and planning for discharge  - Provide patient education as appropriate  7/22/2024 0734 by Sharon Ford RN  Outcome: Progressing  7/22/2024 0734 by Sharon Ford RN  Outcome: Progressing  Goal: Maintain proper alignment of affected body part  Description: INTERVENTIONS:  - Support, maintain and protect limb and body alignment  - Provide patient/ family with appropriate education  7/22/2024 0734 by Sharon Ford RN  Outcome: Progressing  7/22/2024 0734 by Sharon Ford RN  Outcome: Progressing     Problem: Prexisting or High Potential for Compromised Skin Integrity  Goal: Skin integrity is maintained or improved  Description: INTERVENTIONS:  - Identify patients at risk for skin breakdown  - Assess and monitor skin integrity  - Assess and monitor nutrition and hydration status  - Monitor labs   - Assess for incontinence   - Turn and reposition patient  - Assist with mobility/ambulation  - Relieve pressure over bony prominences  - Avoid friction and shearing  - Provide appropriate hygiene as needed including keeping skin clean and dry  - Evaluate need for skin moisturizer/barrier cream  - Collaborate with interdisciplinary team   - Patient/family teaching  - Consider wound care consult   7/22/2024 0734 by Sharon Ford RN  Outcome: Progressing  7/22/2024 0734 by Sharon Ford RN  Outcome: Progressing

## 2024-07-22 NOTE — CONSULTS
Podiatry - Consultation    Patient Information:   Anamaria Parnell 79 y.o. female MRN: [id number]  Unit/Bed#: E5 -01 Encounter: [id number]  PCP: Ritchie Lawton MD  Date of Admission:  7/19/2024  Date of Consultation: 07/22/24  Requesting Physician: Akira Flores DO      ASSESSMENT:    Anamaria Parnell is a 79 y.o. female with:    Right Hallux Dry Gangrene, Davidson Grade 4  Type 2 Diabetes Mellitus  A1c: 5/9% (7/9/24)  Stage 4 Chronic Kidney Disease  Aortoiliac Occlusive Disease  Peripheral Arterial   Cerebral Vascular Accident    PLAN:    Patient was seen and evaluated at bedside today. Right hallux dry gangrene with minimal malodor and no acute clinical signs of infection  Right foot x-ray was reviewed, final read: no evidence of osteomyelitis on 4/18/24  Reviewed leads, right lower extremity with severe diffuse disease throughout the femoral-popliteal arteries, tib-peroneal artery, and distal anterior tibial artery with absent PVR/PPG tracings, absent metatarsal pressure, and absent great toe pressure  Follow up right foot x-rays  Vascular surgery planning for right femoral endarterectomy with retrograde iliac intervention and femoral to BK popliteal bypass tentatively for 7/24  Plan to continue local wound care consisting of betadine paint daily. No podiatric surgical intervention planned at this time  Wound care instructions placed, appreciate   Elevation on green foam wedges or pillows when non-ambulatory  Rest of care per primary team.  Will discuss this plan with my attending and update as needed.    Weightbearing status: Weightbearing as tolerated in surgical shoe    SUBJECTIVE:    History of Present Illness:    Anamaria Parnell is a 79 y.o. female who is originally admitted 7/19/2024 due to right hallux dry gangrene secondary to severe peripheral arterial disease. Patient has a past medical history of right hallux dry gangrene, peripheral chill disease, type 2 diabetes mellitus, stage IV chronic  kidney disease, aortoiliac occlusive disease.    We are consulted for right hallux dry gangrene.  Patient reports that she has been seeing a podiatrist for the last several months since her previous hospitalization at [facility].  However she does not remember her daughter's name at this time.  Patient reports that overall her foot does look much better with reduction in swelling and redness.  Patient states that she does have burning and pain in her right foot that bothers her but otherwise she feels well.  Patient has been wearing a surgical shoe to ambulate and has been applying Betadine to the toe on a regular basis.  Patient denies any fever, nausea, vomiting, chills, or shortness of breath.    Review of Systems:    Constitutional: Negative.    HENT: Negative.    Eyes: Negative.    Respiratory: Negative.    Cardiovascular: Negative.    Gastrointestinal: Negative.    Musculoskeletal: Negative   Skin:Right hallux dry gangrene   Neurological: Numbness and tingling   Psych: Negative.     Past Medical and Surgical History:     Past Medical History:   Diagnosis Date    Aphasia as late effect of cerebrovascular accident (CVA) 08/16/2023    Arthritis     Chronic kidney disease     Diabetes mellitus ([facility])     Embolism and 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 ([facility]) 01/20/2021    Endometriosis     High cholesterol     History of left common carotid artery stent placement 10/27/2023    Hypertension     Kidney disease, chronic, stage III (moderate, EGFR 30-59 ml/min) ([facility])     Lumbar disc herniation     Median arcuate ligament syndrome ([facility]) 11/05/2019    Neuropathy     Obesity (BMI 30-39.9) 12/04/2017    Obesity, morbid ([facility]) 01/20/2021    Peripheral vascular disease ([facility])     Pneumonia     Shoulder injury     left    Spinal stenosis     Stroke ([facility]) 2015    Memory loss       Past Surgical History:   Procedure Laterality Date    CARDIAC CATHETERIZATION      CAROTID STENT Left 9/7/2023    Procedure: L  TCAR;  Surgeon: Nicole Gallo MD;  Location: [facility];  Service: Vascular    COLONOSCOPY      FL RETROGRADE PYELOGRAM  4/6/2020    FRACTURE SURGERY Right     ankle    IR CAROTID STENT  9/7/2023    OVARIAN CYST SURGERY      PA CYSTO BLADDER W/URETERAL CATHETERIZATION Bilateral 4/6/2020    Procedure: CYSTOSCOPY WITH RETROGRADE PYELOGRAM;  Surgeon: Robert Mitchell MD;  Location[facility];  Service: Urology    PA CYSTO W/INSERT URETERAL STENT Right 4/6/2020    Procedure: INSERTION STENT URETERAL;  Surgeon: Robert Mitchell MD;  [facility];  Service: Urology    PA CYSTO W/REMOVAL OF TUMORS SMALL N/A 4/6/2020    Procedure: TRANSURETHRAL RESECTION OF BLADDER TUMOR (TURBT);  Surgeon: Robert Mitchell MD;  Location[facility];  Service: Urology    ROTATOR CUFF REPAIR Left     TONSILLECTOMY         Meds/Allergies:      Medications Prior to Admission:     acetaminophen (TYLENOL) 500 mg tablet    allopurinol (ZYLOPRIM) 100 mg tablet    labetalol (NORMODYNE) 300 mg tablet    losartan (COZAAR) 100 MG tablet    pravastatin (PRAVACHOL) 80 mg tablet    ticagrelor (BRILINTA) 90 MG    traZODone (DESYREL) 50 mg tablet    amLODIPine (NORVASC) 5 mg tablet    aspirin 81 mg chewable tablet    B-D ULTRAFINE III SHORT PEN 31G X 8 MM MISC    Blood Glucose Monitoring Suppl (OneTouch Verio Reflect) w/Device KIT    cinacalcet (SENSIPAR) 30 mg tablet    cloNIDine (CATAPRES-TTS-3) 0.3 mg/24 hr    collagenase (SANTYL) ointment    Diclofenac Sodium (VOLTAREN) 1 %    escitalopram (LEXAPRO) 20 mg tablet    Glucagon 1 MG/0.2ML SOAJ    glucose blood (OneTouch Verio) test strip    levofloxacin (LEVAQUIN) 500 mg tablet    lidocaine (LIDODERM) 5 %    linaGLIPtin (Tradjenta) 5 MG TABS    OneTouch Delica Lancets 33G MISC    Allergies   Allergen Reactions    Fenofibrate Other (See Comments)      blood in urine  hx  Kidney Failure    Colesevelam Other (See Comments)      leg pains    Colestipol Itching and Other (See Comments)       "Swelling lower legs    Ezetimibe GI Intolerance    Statins Myalgia       Social History:     Marital Status: /Civil Union    Substance Use History:   Social History     Substance and Sexual Activity   Alcohol Use Never     Social History     Tobacco Use   Smoking Status Former    Current packs/day: 0.00    Average packs/day: 2.0 packs/day for 54.6 years (109.1 ttl pk-yrs)    Types: Cigarettes    Start date: 1/1/1966    Quit date: 7/27/2020    Years since quitting: 3.9   Smokeless Tobacco Never     Social History     Substance and Sexual Activity   Drug Use Never       Family History:    Family History   Problem Relation Age of Onset    Hypertension Mother     Heart disease Mother         Valvular    Hyperlipidemia Mother     Hypertension Father     Heart defect Father         Cardiomegaly    Stroke Sister         Cerebrovascular Accident    Arthritis Brother     Other Brother         Back Disorder         OBJECTIVE:    Vitals:   Blood Pressure: 117/72 (07/22/24 0706)  Pulse: 88 (07/22/24 0706)  Temperature: 98.1 °F (36.7 °C) (07/22/24 0706)  Temp Source: Oral (07/22/24 0706)  Respirations: 18 (07/22/24 0706)  Height: 4' 10\" (147.3 cm) (07/21/24 0845)  Weight - Scale: 65.3 kg (144 lb) (07/21/24 0845)  SpO2: 96 % (07/22/24 0706)    Physical Exam:    General Appearance: Alert, cooperative, no distress.  HEENT: Head normocephalic, atraumatic, without obvious abnormality.  Heart: Normal rate and rhythm.  Lungs: Non-labored breathing. No respiratory distress.  Abdomen: Without distension.  Psychiatric: AAOx3  Lower Extremity:  Vascular:   Right DP and PT pulses are non-Dopplerable. Left DP and PT pulses are non-Dopplerable. CRT < 3 seconds at the digits. +1/4 edema noted at bilateral lower extremities. Pedal hair is absent. Skin temperature is cool to the right foot compared to the left.    Musculoskeletal:  MMT is 4/5 in all muscle compartments bilaterally. ROM at the 1st MPJ and ankle joint are reduced " "bilaterally with the leg extended. Pain on palpation of right hallux.     Dermatological:    Right Lower Extremity: dry gangrene of the hallux to the level of the proximal hallux phalanx with mild malodor and minimal localized erythema to the based of the hallux. There is no purulence, no fluctuance, no crepitus, and no open lesions.    Neurological:  Gross sensation is intact. Protective sensation is diminished. Patient Reports numbness and/or paresthesias.    Clinical Images 07/22/24:            Additional data:     Lab Results: I have personally reviewed pertinent labs including:    Results from last 7 days   Lab Units 07/22/24  0541   WBC Thousand/uL 13.02*   HEMOGLOBIN g/dL 10.6*   HEMATOCRIT % 32.2*   PLATELETS Thousands/uL 351   SEGS PCT % 80*   LYMPHO PCT % 6*   MONO PCT % 9   EOS PCT % 2     Results from last 7 days   Lab Units 07/22/24  0541 07/17/24  0512 07/16/24  1738   POTASSIUM mmol/L 4.1   < > 4.4   CHLORIDE mmol/L 106   < > 104   CO2 mmol/L 24   < > 21   BUN mg/dL 21   < > 35*   CREATININE mg/dL 1.49*   < > 2.19*   CALCIUM mg/dL 8.3*   < > 8.8   ALK PHOS U/L  --   --  96   ALT U/L  --   --  4*   AST U/L  --   --  7*    < > = values in this interval not displayed.     Results from last 7 days   Lab Units 07/16/24  1738   INR  1.17       Cultures: I have personally reviewed pertinent cultures including:    Results from last 7 days   Lab Units 07/17/24  0220 07/17/24  0156   BLOOD CULTURE   --  No Growth After 5 Days.   URINE CULTURE  >100,000 cfu/ml - Lactobacillus gasseri Lactobacillus species*  <10,000 cfu/ml Micrococcus luteus*  <10,000 cfu/ml - Lactobacillus rhamnosus Lactobacillus species*  --            Imaging: I have personally reviewed pertinent reports in PACS.  EKG, Pathology, and Other Studies: I have personally reviewed pertinent reports.        ** Please Note: Portions of the record may have been created with voice recognition software. Occasional wrong word or \"sound a like\" " substitutions may have occurred due to the inherent limitations of voice recognition software. Read the chart carefully and recognize, using context, where substitutions have occurred. **

## 2024-07-22 NOTE — ASSESSMENT & PLAN NOTE
Lab Results   Component Value Date    HGBA1C 5.9 (H) 07/09/2024     Recent Labs     07/21/24  2108 07/22/24  0705 07/22/24  1110 07/22/24  1606   POCGLU 118 138 121 125     Prior to admission on Tradjenta.  Currently on sliding scale insulin.

## 2024-07-22 NOTE — CASE MANAGEMENT
Case Management Assessment & Discharge Planning Note    Patient name Anamaria Parnell  Location [facility] /E5 -* MRN [id number]  : 1945 Date 2024       Current Admission Date: 2024  Current Admission Diagnosis:Gangrene (HCC)   Patient Active Problem List    Diagnosis Date Noted Date Diagnosed    Coronary artery disease of native artery of native heart with stable angina pectoris (HCC) 2024     Cellulitis of toe of right foot 2024     Sepsis (HCC) 2024     Gangrene (HCC) 2024     Diabetic ulcer of toe of right foot associated with diabetes mellitus due to underlying condition, limited to breakdown of skin (HCC) 2024     Carotid stenosis, asymptomatic, right 10/27/2023     Complex renal cyst 10/18/2023     Encounter for follow-up surveillance of urothelial carcinoma of lower urinary tract 10/18/2023     Encounter to discuss test results 10/17/2023     Smoking 2023     Renal cyst 2023     Primary hypertension 2023     Dysarthria 08/15/2023     Stage 3b chronic kidney disease (HCC)      Aortoiliac occlusive disease (HCC) 10/11/2022     History of gastrointestinal stromal tumor (GIST) 2022     Secondary hyperparathyroidism of renal origin (HCC) 2022     Gastrointestinal stromal tumor (GIST) (HCC) 2022     Type 2 diabetes mellitus, without long-term current use of insulin (HCC) 2021     Type 2 diabetes mellitus with diabetic peripheral angiopathy without gangrene (HCC) 2021     Depression, recurrent (HCC) 2021     Stage 4 chronic kidney disease (HCC) 2020     Hypertensive kidney disease with stage 4 chronic kidney disease (HCC) 2020     Cervical radiculopathy 2020     Malignant neoplasm of overlapping sites of bladder (HCC) 2020     Stenosis of left anterior descending artery Mid LAD 50-60% stenosis 2020     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  with collateral circ 04/01/2020     Pulmonary nodule 7 mm groundglass opacity in the right upper lobe, unchanged since October 2019 03/19/2020     Atherosclerosis of native arteries of right leg with ulceration of other part of foot ([facility]) 03/03/2020     Symptomatic carotid artery stenosis, left 03/03/2020     Femoral artery stenosis, right ([facility]) 50-75% stenosis in the common femoral artery. 01/14/2020     Mesenteric artery stenosis ([facility]) 01/14/2020     Positive cardiac stress test  small, mildly severe, partially reversible myocardial perfusion defect of anterior and inferior wall  11/12/2019     Abnormal CT of the chest suspicious for an infectious or inflammatory bronchiolitis. 11/07/2019     Celiac artery stenosis ([facility]) >70% stenosis in the celiac trunk 11/05/2019     Aortoiliac stenosis, left ([facility]) 02/25/2019     Spondylosis of lumbar region without myelopathy or radiculopathy 01/29/2019     Lumbosacral spondylosis without myelopathy 01/29/2019     Statin intolerance 01/22/2019     Lumbar disc herniation 01/22/2019     Herniated lumbar intervertebral disc 01/22/2019     Chronic GERD 12/04/2017     Acute renal failure superimposed on stage 3 chronic kidney disease ([facility]) 12/04/2017     Claudication in peripheral vascular disease ([facility]) 12/04/2017     Gout 12/04/2017     Hx-TIA (transient ischemic attack) 12/04/2017     Hyperlipidemia associated with type 2 diabetes mellitus  ([facility]) 12/04/2017     Hypertension associated with diabetes  ([facility]) 12/04/2017     Osteoarthritis 12/04/2017     Right renal artery stenosis ([facility]) 12/04/2017     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     Hyperlipidemia, unspecified 12/04/2017     Vitamin D deficiency 09/08/2016     Basilar artery stenosis 06/22/2016     Type 2 diabetes mellitus with diabetic nephropathy ([facility]) 12/19/2015     Hypercholesteremia 12/19/2015     Hypertension 12/19/2015     CVA (cerebral vascular accident) ([facility]) 11/09/2015       LOS (days): 3  Geometric Mean LOS  (GMLOS) (days):   Days to GMLOS:     OBJECTIVE:    Risk of Unplanned Readmission Score: 30.8         Current admission status: Inpatient       Preferred Pharmacy:   [id number] - [place] - [address]  [address]  [facility] [address]  Phone: [phone] Fax: [phone]    Primary Care Provider: Ritchie Lawton MD    Primary Insurance: [employer] RE[employer]  Secondary Insurance:     ASSESSMENT:  Active Health Care Proxies       inder mcfarland Health Care Representative - Son   Primary Phone: [phone] (Mobile)                 Advance Directives  Does patient have a Health Care POA?: Yes  Does patient have Advance Directives?: Yes  Primary Contact: inder mcfarland (Son)  [phone] (Mobile)    Readmission Root Cause  30 Day Readmission: No    Patient Information  Admitted from:: Home  Mental Status: Alert  During Assessment patient was accompanied by: Not accompanied during assessment  Assessment information provided by:: Patient, Son  Primary Caregiver: Family  Caregiver's Name:: Kris Avila   [phone]  Caregiver's Relationship to Patient:: Family Member  Caregiver's Telephone Number:: [phone]  Support Systems: Self, Son, Friends/neighbors  County of Residence: [facility] entry access options. Select all that apply.: Ramp  Type of Current Residence: [facility]  Living Arrangements: Lives w/ Son  Is patient a ?: No    Activities of Daily Living Prior to Admission  Functional Status: Independent  Completes ADLs independently?: Yes  Ambulates independently?: No  Level of ambulatory dependence: Total Dependent  Does patient use assisted devices?: Yes  Assisted Devices (DME) used: Wheelchair, Shower Chair, Other (Comment)  Does patient currently own DME?: Yes  What DME does the patient currently own?: Stair Chair/Glide, Wheelchair, Walker, Straight Cane, Quad Cane  Does patient have a history of Outpatient Therapy (PT/OT)?: No  Does the patient have a history of  Short-Term Rehab?: Yes  Does patient have a history of HHC?: Yes ([facility])  Does patient currently have HHC?: No    Patient Information Continued  Income Source: SSI/SSD  Does patient have prescription coverage?: Yes  Does patient receive dialysis treatments?: No  Does patient have a history of substance abuse?: No  Does patient have a history of Mental Health Diagnosis?: No    Means of Transportation  Means of Transport to [facility]:: Family transport      Social Determinants of Health (SDOH)      Flowsheet Row Most Recent Value   Housing Stability    In the last 12 months, was there a time when you were not able to pay the mortgage or rent on time? N   In the past 12 months, how many times have you moved where you were living? 0   At any time in the past 12 months, were you homeless or living in a shelter (including now)? N   Transportation Needs    In the past 12 months, has lack of transportation kept you from medical appointments or from getting medications? no   In the past 12 months, has lack of transportation kept you from meetings, work, or from getting things needed for daily living? No   Food Insecurity    Within the past 12 months, you worried that your food would run out before you got the money to buy more. Never true   Within the past 12 months, the food you bought just didn't last and you didn't have money to get more. Never true   Utilities    In the past 12 months has the electric, gas, oil, or water company threatened to shut off services in your home? No            DISCHARGE DETAILS:    Discharge planning discussed with:: patient's son Austin  Freedom of Choice: Yes  Comments - Freedom of Choice: Austin states he will review list of accepting rehab facilities w/ patient then choose  CM contacted family/caregiver?: Yes  Were Treatment Team discharge recommendations reviewed with patient/caregiver?: Yes  Did patient/caregiver verbalize understanding of patient care needs?: Yes  Were  patient/caregiver advised of the risks associated with not following Treatment Team discharge recommendations?: Yes    Contacts  Patient Contacts: inder mcfarland (Son)  [phone] (Mobile)  Relationship to Patient:: Family  Contact Method: Phone  Phone Number: [phone]  Reason/Outcome: Emergency Contact, Continuity of Care, Discharge Planning    Treatment Team Recommendation: Short Term Rehab     Additional Comments: CM spoke with patient's son Inder to discuss PT OT recommendation for [facility] advised patient  may not agree but is open to sending referrals and discussing the list if accepting facilities w/ patient when list is available.

## 2024-07-22 NOTE — OCCUPATIONAL THERAPY NOTE
Occupational Therapy Evaluation     Patient Name: Anamaria Parnell  Today's Date: 7/22/2024  Problem List  Principal Problem:    Gangrene ([facility])  Active Problems:    Basilar artery stenosis    CVA (cerebral vascular accident) ([facility])    Stage 4 chronic kidney disease ([facility])    Type 2 diabetes mellitus, without long-term current use of insulin (HCC)    Aortoiliac occlusive disease ([facility])    Primary hypertension    Cellulitis of toe of right foot    Coronary artery disease of native artery of native heart with stable angina pectoris ([facility])    Past Medical History  Past Medical History:   Diagnosis Date    Aphasia as late effect of cerebrovascular accident (CVA) 08/16/2023    Arthritis     Chronic kidney disease     Diabetes mellitus ([facility])     Embolism and 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 ([facility]) 01/20/2021    Endometriosis     High cholesterol     History of left common carotid artery stent placement 10/27/2023    Hypertension     Kidney disease, chronic, stage III (moderate, EGFR 30-59 ml/min) ([facility])     Lumbar disc herniation     Median arcuate ligament syndrome ([facility]) 11/05/2019    Neuropathy     Obesity (BMI 30-39.9) 12/04/2017    Obesity, morbid ([facility]) 01/20/2021    Peripheral vascular disease ([facility])     Pneumonia     Shoulder injury     left    Spinal stenosis     Stroke ([facility]) 2015    Memory loss     Past Surgical History  Past Surgical History:   Procedure Laterality Date    CARDIAC CATHETERIZATION      CAROTID STENT Left 9/7/2023    Procedure: L TCAR;  Surgeon: Nicole Gallo MD;  [facility]: [facility];  Service: Vascular    COLONOSCOPY      FL RETROGRADE PYELOGRAM  4/6/2020    FRACTURE SURGERY Right     ankle    IR CAROTID STENT  9/7/2023    OVARIAN CYST SURGERY      LA CYSTO BLADDER W/URETERAL CATHETERIZATION Bilateral 4/6/2020    Procedure: CYSTOSCOPY WITH RETROGRADE PYELOGRAM;  Surgeon: Robert Mitchell MD;  [facility];  Service: Urology    LA CYSTO W/INSERT URETERAL STENT Right  "4/6/2020    Procedure: INSERTION STENT URETERAL;  Surgeon: Robert Mitchell MD;  [facility];  Service: Urology    UT CYSTO W/REMOVAL OF TUMORS SMALL N/A 4/6/2020    Procedure: TRANSURETHRAL RESECTION OF BLADDER TUMOR (TURBT);  Surgeon: Robert Mitchell MD;  [facility];  Service: Urology    ROTATOR CUFF REPAIR Left     TONSILLECTOMY             07/22/24 0851   Note Type   Note type Evaluation   Pain Assessment   Pain Assessment Tool 0-10   Pain Score 3   Pain Location/Orientation Orientation: Right;Location: Foot   Restrictions/Precautions   Weight Bearing Precautions Per Order Yes   RLE Weight Bearing Per Order WBAT  (per verbal by podiatry(i.e.Dr. Dixon))   Braces or Orthoses   (sx shoe)   Other Precautions Fall Risk;Pain;Multiple lines;Chair Alarm;Bed Alarm;Cognitive   Home Living   Type of Home House   Home Layout One level;Ramped entrance   Bathroom Shower/Tub Walk-in shower   Bathroom Toilet Standard   Bathroom Equipment Shower chair;Grab bars in shower;Grab bars around toilet   Home Equipment Walker;Wheelchair-electric;Cane   Prior Function   Level of [place] Independent with ADLs;Needs assistance with IADLS;Independent with functional mobility   Lives With Son  (and son's SO)   Falls in the last 6 months 0   Comments PTA pt states independence with her ADLs, transfers--i.e.stand-pivot, limited ambulation; states limited ambulation last 1-2months; sleeps in lift chair, neg falls; neg , neg home alone   Lifestyle   Autonomy PTA pt states independence with her ADLs, transfers--i.e.stand-pivot, limited ambulation; states limited ambulation last 1-2months; sleeps in lift chair, neg falls; neg , neg home alone   Reciprocal Relationships 1 son   Service to Others homemaker   Intrinsic Gratification watching TV   Subjective   Subjective \"I just want my toe off.\"   ADL   Where Assessed Chair   Eating Assistance 6  Modified independent   Grooming Assistance 6  Modified Independent "   UB Bathing Assistance 5  Supervision/Setup   LB Bathing Assistance 4  Minimal Assistance   UB Dressing Assistance 5  Supervision/Setup   LB Dressing Assistance 4  Minimal Assistance   Toileting Assistance  4  Minimal Assistance   Transfers   Sit to Stand 5  Supervision   Additional items Increased time required;Verbal cues   Stand to Sit 5  Supervision   Additional items Increased time required;Verbal cues   Stand pivot 4  Minimal assistance   Additional items Assist x 1;Increased time required;Verbal cues   Functional Mobility   Functional Mobility 4  Minimal assistance   Additional Comments x1   Additional items Rolling walker   Balance   Static Sitting Good   Dynamic Sitting Fair +   Static Standing Fair -   Dynamic Standing Poor +   Activity Tolerance   Activity Tolerance Patient limited by fatigue;Patient limited by pain   Medical Staff Made Aware nsg, P.T.   RUE Assessment   RUE Assessment WFL   RUE Strength   RUE Overall Strength Within Functional Limits - able to perform ADL tasks with strength  (4/5 throughout)   LUE Assessment   LUE Assessment WFL   LUE Strength   LUE Overall Strength Within Functional Limits - able to perform ADL tasks with strength  (4/5 throughout)   Hand Function   Gross Motor Coordination Functional   Fine Motor Coordination Functional   Sensation   Light Touch No apparent deficits   Proprioception   Proprioception No apparent deficits   Vision-Basic Assessment   Current Vision   (glasses)   Vision - Complex Assessment   Acuity   (impaired)   Psychosocial   Psychosocial (WDL) WDL   Perception   Inattention/Neglect Appears intact   Cognition   Overall Cognitive Status WFL   Arousal/Participation Alert   Attention Attends with cues to redirect   Orientation Level Oriented X4   Memory Decreased recall of precautions;Decreased short term memory   Following Commands Follows one step commands without difficulty   Assessment   Limitation Decreased ADL status;Decreased UE strength;Decreased  "Safe judgement during ADL;Decreased cognition;Decreased endurance;Decreased high-level ADLs   Prognosis Good   Assessment Pt is a 80y/o female admitted to the hospital 2* R toe gangrene(x-[facility]errat from [facility]) for a revascularization of her R LE--R femoral endarterectomy with retrograde iliac intervention and femoral to BK popliteal bypass w/ GSV(7/24). Podiatry currently allowing WBAT R LE. Pt with PMH PVD, DM, CVA, CKD, HTN, spinal stenosis, L RTC repair, ankle fx, CAD--s/p stents. PTA pt states independence with her ADLs, transfers--i.e.stand-pivot, limited ambulation; states limited ambulation last 1-2months; sleeps in lift chair, neg falls; neg , neg home alone. During  initial eval, pt demonstrated deficits with her functional balance, functional mobility, ADL status, transfer safety, b/l UE strength, activity tolerance(currently fair=15-20mins), and questionable cognition(i.e.memory). Pt would benefit from continued OT tx for the above deficits.3-5xwk/1-2wks. The patient's raw score on the AM-PAC Daily Activity Inpatient Short Form is 19. A raw score of greater than or equal to 19 suggests the patient may benefit from discharge to home. Please refer to the recommendation of the Occupational Therapist for safe discharge planning.   Goals   Patient Goals \"to get my toe off.\"   STG Time Frame   (1-7 days)   Short Term Goal #1 Pt will demonstrate improved activity tolerance to good(20-30mins) and standing tolerance to 3-5mins to assist with ADLs.   Short Term Goal #2 Pt will demonstrate mod I with their sit-stand transfers to assist with completion of their LE dressing.   Short Term Goal  Pt will demonstrate proper walker/transfer safety 100% of the time.   LTG Time Frame   (7-14 days)   Long Term Goal #1 Pt will demonstrate mod I with their UE and LE bathing/dresssing.   Long Term Goal #2 Pt will demonstrate g/g- balance with all functional activities.   Long Term Goal Pt will independently verbalize 2-3 potential " fall risks/transfer safety hazards and their appropriate compensation techniques.   Plan   Treatment Interventions ADL retraining;Functional transfer training;UE strengthening/ROM;Endurance training;Cognitive reorientation;Patient/family training;Equipment evaluation/education;Compensatory technique education;Continued evaluation   Goal Expiration Date 08/05/24   OT Treatment Day 0   OT Frequency 3-5x/wk   Discharge Recommendation   Rehab Resource Intensity Level, OT II (Moderate Resource Intensity)   AM-PAC Daily Activity Inpatient   Lower Body Dressing 3   Bathing 3   Toileting 3   Upper Body Dressing 3   Grooming 3   Eating 4   Daily Activity Raw Score 19   Daily Activity Standardized Score (Calc for Raw Score >=11) 40.22   AM-PAC Applied Cognition Inpatient   Following a Speech/Presentation 4   Understanding Ordinary Conversation 4   Taking Medications 3   Remembering Where Things Are Placed or Put Away 3   Remembering List of 4-5 Errands 3   Taking Care of Complicated Tasks 2   Applied Cognition Raw Score 19   Applied Cognition Standardized Score 39.77   Job [place]

## 2024-07-22 NOTE — PROGRESS NOTES
Progress Note - Cardiology   Anamaria Parnell 79 y.o. female MRN: [id number]  Unit/Bed#: E5 -01 Encounter: [id number]      Assessment/Recommendations/Discussion:   Preoperative cardiac risk assessment for right femoral endarterectomy with retrograde stenting and left leg bypass  Right hallux dry gangrene with severe peripheral arterial disease  Coronary artery disease with stable angina  Aortoiliac occlusive disease  Stage III-IV chronic kidney disease  Type 2 diabetes but A1c controlled at 5.9  Hypertension  History of stroke on dual antiplatelet therapy  Renal artery stenosis  Mesenteric artery stenosis  Ambulatory dysfunction    Results from last 7 days   Lab Units 07/21/24  0930   SL CV LV EF  41        PLAN  She has severe vasculopathic disease-coronary artery disease, cerebrovascular disease, mesenteric artery, renal artery and peripheral arterial disease  Given her risk factors as above with known coronary artery disease, history of stroke, hypertension, type 2 diabetes, advanced chronic kidney disease with peripheral arterial disease, and significant ambulatory dysfunction where she is basically wheelchair-bound she is going to be high risk for surgery  EKG changes noted from prior, QRS now widened with nonspecific interventricular conduction delay which is bordering on left bundle branch block with anterior infarct type pattern.  Echo yesterday with EF 41% with wall motion abnormalities in the septum, apical inferior and apical lateral and true apex itself.  With reduced EF and wall motion abnormalities concerning for underlying CAD, of which she is a very high risk to have with her diabetes hypertension stroke history chronic kidney disease renal artery stenosis mesenteric artery stenosis and severe peripheral vascular disease with aortoiliac occlusive disease and known borderline obstructive CAD several years ago, will stop labetalol and replace with Toprol- mg  She is on losartan at home,  "currently being held but will continue with this long-term on discharge  Nuclear stress last year with no evidence of stress-induced ischemia and had normal EF at that time, now EF reduced on echo but without overt symptoms or signs of heart failure.  Risk for surgery is high however her risk if nothing is done regarding her foot wound is also very high as this will certainly cause complications down the road if not addressed        Subjective:   HPI  No chest pain shortness of breath or angina type symptoms.  Had echo yesterday      Review of Systems: As noted in HPI. Rest of ROS is negative.    Vitals:   /72 (BP Location: Right arm)   Pulse 88   Temp 98.1 °F (36.7 °C) (Oral)   Resp 18   Ht 4' 10\" (1.473 m)   Wt 65.3 kg (144 lb)   LMP  (LMP Unknown)   SpO2 96%   BMI 30.10 kg/m²   I/O         07/20 0701 07/21 0700 07/21 0701  07/22 0700 07/22 0701  07/23 0700    P.O. 600 420 120    Total Intake(mL/kg) 600 (9.2) 420 (6.4) 120 (1.8)    Urine (mL/kg/hr) 350 (0.2) 1400 (0.9)     Total Output 350 1400     Net +250 -980 +120           Unmeasured Urine Occurrence 1 x            Weight (last 2 days)       Date/Time Weight    07/21/24 0845 65.3 (144)            Physical Exam   Constitutional: awake, alert and oriented, in no acute distress, no obvious deformities, obese female  Head: Normocephalic, without obvious abnormality, atraumatic  Eyes: conjunctivae clear and moist. Sclera anicteric. No xanthelasmas. Pupils equal bilaterally. Extraocular motions are full.  Ear nose mouth and throat: ears are symmetrical bilaterally, hearing appears to be equal bilaterally, no nasal discharge or epistaxis, oropharynx is clear with moist mucous membranes  Neck: Trachea is midline, neck is supple, no thyromegaly or significant lymphadenopathy, there is full range of motion.  Lungs: clear to auscultation bilaterally, no wheezes, no rales, no rhonchi, no accessory muscle use, breathing is nonlabored  Heart: regular rate and " rhythm, S1, S2 normal, no murmur, no click, no rub and no gallop, no lower extremity edema  Abdomen: Obese, soft, non-tender; bowel sounds normal; no masses, no organomegaly  Psychiatric: Patient is oriented to time, place, person, mood/affect is negative for depression, anxiety, agitation, appears to have appropriate insight  Skin: Right foot wound with gangrene noted    TELEMETRY:   Lab Results:  Results from last 7 days   Lab Units 07/22/24  0541   WBC Thousand/uL 13.02*   HEMOGLOBIN g/dL 10.6*   HEMATOCRIT % 32.2*   PLATELETS Thousands/uL 351     Results from last 7 days   Lab Units 07/22/24  0541 07/17/24  0512 07/16/24  1738   POTASSIUM mmol/L 4.1   < > 4.4   CHLORIDE mmol/L 106   < > 104   CO2 mmol/L 24   < > 21   BUN mg/dL 21   < > 35*   CREATININE mg/dL 1.49*   < > 2.19*   CALCIUM mg/dL 8.3*   < > 8.8   ALK PHOS U/L  --   --  96   ALT U/L  --   --  4*   AST U/L  --   --  7*    < > = values in this interval not displayed.     Results from last 7 days   Lab Units 07/22/24  0541   POTASSIUM mmol/L 4.1   CHLORIDE mmol/L 106   CO2 mmol/L 24   BUN mg/dL 21   CREATININE mg/dL 1.49*   CALCIUM mg/dL 8.3*           Medications:    Current Facility-Administered Medications:     acetaminophen (TYLENOL) tablet 975 mg, 975 mg, Oral, Q8H JAVIER, Kimberly Geiger MD, 975 mg at 07/22/24 0539    allopurinol (ZYLOPRIM) tablet 100 mg, 100 mg, Oral, Daily, Kimberly Geiger MD, 100 mg at 07/22/24 0828    amLODIPine (NORVASC) tablet 5 mg, 5 mg, Oral, Daily, GRACIELA Alvares, 5 mg at 07/22/24 0828    aspirin chewable tablet 81 mg, 81 mg, Oral, Daily, Kimberly Geiger MD, 81 mg at 07/22/24 0828    ceFAZolin (ANCEF) IVPB (premix in dextrose) 1,000 mg 50 mL, 1,000 mg, Intravenous, Q12H, Kimberly Geiger MD, Last Rate: 100 mL/hr at 07/22/24 0828, 1,000 mg at 07/22/24 0828    [START ON 7/23/2024] cloNIDine (CATAPRES-TTS-3) 0.3 mg/24 hr TD weekly patch, 1 patch, Transdermal, Weekly, Kimberly Geiger MD    escitalopram (LEXAPRO) tablet 20 mg, 20 mg, Oral, Daily, Kimberly  "MD Juanjo, 20 mg at 07/22/24 0828    heparin (porcine) subcutaneous injection 5,000 Units, 5,000 Units, Subcutaneous, Q8H [facility], Kimberly Geiger MD, 5,000 Units at 07/22/24 0539    HYDROmorphone HCl (DILAUDID) injection 0.2 mg, 0.2 mg, Intravenous, Q6H PRN, Kimberly Geiger MD    insulin lispro (HumALOG/ADMELOG) 100 units/mL subcutaneous injection 1-5 Units, 1-5 Units, Subcutaneous, TID AC **AND** Fingerstick Glucose (POCT), , , TID AC, Kimberly Geiger MD    labetalol (NORMODYNE) tablet 300 mg, 300 mg, Oral, Q12H JAVIER, Kimberly Geiger MD, 300 mg at 07/22/24 0828    lidocaine (LIDODERM) 5 % patch 1 patch, 1 patch, Topical, Daily, Kimberly Geiger MD, 1 patch at 07/20/24 0827    ondansetron (ZOFRAN) injection 4 mg, 4 mg, Intravenous, Q6H PRN, Kimberly Geiger MD    oxyCODONE (ROXICODONE) IR tablet 5 mg, 5 mg, Oral, Q6H PRN, Kimberly Geiger MD, 5 mg at 07/22/24 0827    pravastatin (PRAVACHOL) tablet 80 mg, 80 mg, Oral, Daily With Dinner, Kimberly Geiger MD, 80 mg at 07/21/24 1729    sodium bicarbonate tablet 650 mg, 650 mg, Oral, BID after meals, GRACIELA Alvares, 650 mg at 07/22/24 0828    ticagrelor (BRILINTA) tablet 90 mg, 90 mg, Oral, Q12H JAVIERKimberly MD, 90 mg at 07/22/24 0827    traZODone (DESYREL) tablet 50 mg, 50 mg, Oral, HS, Kimberly Geiger MD, 50 mg at 07/21/24 2110    Portions of the record may have been created with voice recognition software. Occasional wrong word or \"sound a like\" substitutions may have occurred due to the inherent limitations of voice recognition software. Read the chart carefully and recognize, using context, where substitutions have occurred.    Timothy Rey DO, [facility], [facility]  7/22/2024 10:26 AM      "

## 2024-07-22 NOTE — APP STUDENT NOTE
"Assessment and Plan:   Gangrene   Patient with history of severe aortoiliac occlusive disease, CAD with stable angina, T2DM, stage 4 CKD, hx of CVA and basilar artery stenosis admitted due to concerns of worsening gangrene of R hallux  Vascular following with plan for surgery on 7/24 for R femoral endarterectomy with retrograde stenting and R leg bypass from femoral artery to tibial artery   ECHO completed 7/21 revealing \"EF 41% with wall motion abnormalities in the septum, apical inferior and apical lateral and true apex itself\".   Cardiology stating \"Risk for surgery is high however her risk if nothing is done regarding her foot wound is also very high as this will certainly cause complications down the road if not addressed\"  IR lower extremity angiogram ordered prior to surgery  Podiatry following  Recommends continuing betadine paint daily  No podiatric intervention at this time  Continue hydromorphone and oxycodone as needed for breakthrough and severe pain    Cellulitis of toe of right foot    Concern of cellulitis surrounding R gangrenous hallux   Bcx negative   Remains afebrile  Xray of R foot pending   Continue IV ancef    Aortoiliac occlusive disease    Patient has known history of severe vascular disease and chronic limb ischemia  Vascular following  LISA 0.11  Surgery on 7/24 for R femoral endarterectomy with retrograde iliac intervention and R femoral to BK popliteal bypass with GSV  Continue ASA, pravastatin 80 mg once daily, and brilinta 90 mg twice daily    Coronary artery disease of native artery of native heart with stable angina pectoris     Continue ASA, pravastatin 80 mg once daily, and brilinta 90 mg twice daily    Type 2 diabetes mellitus without long term current use of insulin    A1C currently well controlled at 5.9    Continue insulin sliding scale and diabetic diet   Stage 4 chronic kidney disease     Cr on admission elevated at 2.19 which has since improved to 1.49 which is the patients " baseline   Avoid nephrotoxins and hypotension  Hold losartan for now     CVA     Continue ASA, pravastatin 80 mg once daily, and brilinta 90 mg twice daily    Basilar artery stenosis     Continue ASA, pravastatin 80 mg once daily, and brilinta 90 mg twice daily    Primary hypertension    Continue to hold losartan in anticipation of surgery on    Cardiology recommending discontinuing labetalol and replacing with 100 mg Toprol XL in light of upcoming vascular surgery  Continue clonidine patch    Code Status: Level 3 - DNAR and DNI    Subjective:   Patient was met while sitting in her chair today. She states that she initially presented to the hospital due to R toe pain and swelling that has worsened over the course of several weeks. She states that about 1-1.5 months ago she noticed a black discoloration on her R hallux and went to her podiatrist. She has since been following with podiatry and her toe has since become more painful, malodorous, and the black discoloration is now most of her R hallux. Patient was seen at podiatry the day of presentation to the ER who recommended admission to the hospital. Since being hospitalized, her pain has decreased and become more tolerable with her pain regiment. Reports its a 10/10 when uncontrolled but currently rates it a 0/10. Reports that the pain does radiate to the arch of her R foot. She is unable to ambulate and is wheelchair bound due to her extensive arterial disease. Denies current fever, chest pain, palpitations, shortness of breath, nausea, or vomiting.     Objective:     Vitals:   Temp (24hrs), Av.3 °F (36.8 °C), Min:98.1 °F (36.7 °C), Max:98.4 °F (36.9 °C)    Temp:  [98.1 °F (36.7 °C)-98.4 °F (36.9 °C)] 98.1 °F (36.7 °C)  HR:  [76-88] 88  Resp:  [18] 18  BP: ()/(53-72) 117/72  SpO2:  [95 %-98 %] 96 %  Body mass index is 30.1 kg/m².     Input and Output Summary (last 24 hours):     Intake/Output Summary (Last 24 hours) at 2024 1244  Last data  filed at 7/22/2024 0806  Gross per 24 hour   Intake 120 ml   Output 400 ml   Net -280 ml       Physical Exam:   Physical Exam  Vitals and nursing note reviewed.   Constitutional:       Appearance: Normal appearance. She is obese. She is not ill-appearing.   HENT:      Head: Normocephalic and atraumatic.   Cardiovascular:      Rate and Rhythm: Normal rate and regular rhythm.      Heart sounds: Normal heart sounds.      Comments: DP and PT not able to be detected on bilaterally without dopler  Pulmonary:      Effort: Pulmonary effort is normal. No respiratory distress.      Breath sounds: Normal breath sounds. No decreased air movement. No decreased breath sounds.   Musculoskeletal:      Right lower leg: Edema present.   Feet:      Comments: R hallux gangrene observed - black discoloration and malodorous   No other wounds visualized on R foot  R foot with moderate swelling  Cold to touch when compared to L foot   DP and PT pulses not detectable without doppler bilaterally    L foot is normal with the exception of absent pulses without doppler (DP/PT)    Unable to ambulate at baseline  Skin:     General: Skin is warm.      Capillary Refill: Capillary refill takes more than 3 seconds.   Neurological:      Mental Status: She is alert. Mental status is at baseline.   Psychiatric:         Mood and Affect: Mood normal.         Behavior: Behavior normal. Behavior is cooperative.         Thought Content: Thought content normal.         Judgment: Judgment normal.          Additional Data:     Labs:  Results from last 7 days   Lab Units 07/22/24  0541   WBC Thousand/uL 13.02*   HEMOGLOBIN g/dL 10.6*   HEMATOCRIT % 32.2*   PLATELETS Thousands/uL 351   SEGS PCT % 80*   LYMPHO PCT % 6*   MONO PCT % 9   EOS PCT % 2     Results from last 7 days   Lab Units 07/22/24  0541 07/17/24  0512 07/16/24  1738   SODIUM mmol/L 138   < > 140   POTASSIUM mmol/L 4.1   < > 4.4   CHLORIDE mmol/L 106   < > 104   CO2 mmol/L 24   < > 21   BUN mg/dL 21    < > 35*   CREATININE mg/dL 1.49*   < > 2.19*   ANION GAP mmol/L 8   < > 15*   CALCIUM mg/dL 8.3*   < > 8.8   ALBUMIN g/dL  --   --  3.8   TOTAL BILIRUBIN mg/dL  --   --  0.38   ALK PHOS U/L  --   --  96   ALT U/L  --   --  4*   AST U/L  --   --  7*   GLUCOSE RANDOM mg/dL 118   < > 106    < > = values in this interval not displayed.     Results from last 7 days   Lab Units 07/16/24  1738   INR  1.17     Results from last 7 days   Lab Units 07/22/24  1110 07/22/24  0705 07/21/24  2108 07/21/24  1614 07/21/24  1108 07/21/24  0707 07/20/24  2045 07/20/24  1613 07/20/24  1117 07/20/24  0733 07/19/24  2018 07/19/24  1738   POC GLUCOSE mg/dl 121 138 118 113 95 98 148* 142* 135 104 109 127               Lines/Drains:  Invasive Devices       Peripheral Intravenous Line  Duration             Peripheral IV 07/21/24 Distal;Dorsal (posterior);Right Forearm 1 day                          Imaging: {Radiology Review:[id number]}    Recent Cultures (last 7 days):   Results from last 7 days   Lab Units 07/17/24  0220 07/17/24  0156   BLOOD CULTURE   --  No Growth After 5 Days.   URINE CULTURE  >100,000 cfu/ml - Lactobacillus gasseri Lactobacillus species*  <10,000 cfu/ml Micrococcus luteus*  <10,000 cfu/ml - Lactobacillus rhamnosus Lactobacillus species*  --        Last 24 Hours Medication List:   Current Facility-Administered Medications   Medication Dose Route Frequency Provider Last Rate    acetaminophen  975 mg Oral Q8H [facility] Kimberly Geiger MD      allopurinol  100 mg Oral Daily Kimberly Geiger MD      amLODIPine  5 mg Oral Daily GRACIELA Alvares      aspirin  81 mg Oral Daily Kimberly Geiger MD      cefazolin  1,000 mg Intravenous Q12H Kimberly Geiger MD 1,000 mg (07/22/24 0828)    [START ON 7/23/2024] cloNIDine  1 patch Transdermal Weekly Kimberly Geiger MD      escitalopram  20 mg Oral Daily Kimberly Geiger MD      heparin (porcine)  5,000 Units Subcutaneous Q8H [facility] Kimberly Geiger MD      HYDROmorphone  0.2 mg Intravenous Q6H PRN Kimberly Geiger MD      insulin  lispro  1-5 Units Subcutaneous TID AC Kimberly Geiger MD      lidocaine  1 patch Topical Daily Kimberly Geiger MD      metoprolol succinate  100 mg Oral Daily Timothy Rey DO      ondansetron  4 mg Intravenous Q6H PRN Kimberly Geiger MD      oxyCODONE  5 mg Oral Q6H PRN Kimberly Geiger MD      pravastatin  80 mg Oral Daily With Dinner Kimberly Geiger MD      sodium bicarbonate  650 mg Oral BID after meals GRACIELA Alvares      ticagrelor  90 mg Oral Q12H [facility] Kimberly Geiger MD      traZODone  50 mg Oral HS Kimberly Geiger MD          Today, Patient Was Seen By: Sydney Souliere    **Please Note: This note may have been constructed using a voice recognition system.**

## 2024-07-22 NOTE — PROGRESS NOTES
[facility]  Progress Note  Name: Anamaria Parnell I  MRN: [id number]  Unit/Bed#: E5 -01 I Date of Admission: 7/19/2024   Date of Service: 7/22/2024 I Hospital Day: 3    Assessment & Plan   * Gangrene (HCC)  Assessment & Plan  80 yo female ex-smoker w/ a hx of obesity, CKD IV, DM II, HLD, HTN, CAD, L MCA and PCA CVA S/P L TCAR 9/7/23 (Sabino), FÉLIX, mesenteric artery stenosis and aortoiliac occlusive disease w/ PAD and chronic R hallux dry gangrene presented to [facility] on 7/16/24 w/ worsening R great toe pain and R hallux gangrene. Pt seen in consult by nephrology who continues to follow pt for RICARDO on CKD. Pt was transferred to [facility] on 7/19 w/ plans for R femoral endarterectomy w/ retrograde stenting and left leg bypass, which may have to be staged surgeries based on her comorbid conditions.    Vascular surgery consulted for possible revascularization options for known CLTI w/ AIOD, PAD and R hallux dry gangrene. Pt follows w/ vascular surgery for PAD and chronic limb ischemia; last seen in the office on 6/4/24. Imaging shows extensive AIOD and peripheral arterial disease with occlusion of the R iliac system, nearly occlusive R CFA, and occlusion of popliteal artery with recon of the below knee popliteal and tibials. R LISA: 0.11/-/-. Plan for R fem endart w/ retrograde iliac intervention and R femoral to BK pop bypass w/ GSV on 7/24.    Diagnostics:  -7/22/24 Xray R foot: (PENDING)  -7/21/24 Echo: Left ventricular cavity size is normal. Wall thickness is severely increased. The left ventricular ejection fraction is 41% by biplane measurement. Systolic function is mildly reduced. Global longitudinal strain is reduced at -10% with moderate to severe strain reduction in the ED mid to basal anteroseptal septal and inferior walls.. Unable to grade diastolic dysfunction given the severe degree of mitral annular calcification.   -7/18/24 LE vein mapping: Right: The great saphenous vein is patent   "from the groin to the ankle. The intraluminal diameter measurements range from 2.2mm to 6.4mm throughout. Left: The great saphenous vein is patent from the groin to the ankle. The intraluminal diameter measurements range from 1.3mm to 6.8mm throughout.  -7/18/24 CTA abd w/ runoff: (COMPLETED. REPORT PENDING)  -7/16/24 LEAD: Right: Severe diffuse disease noted throughout the femoral-popliteal arteries with high grade stenosis vs occlusion of the proximal-distal superficial femoral artery with reconstitution to the popliteal artery. Evidence suggestive of TIb/Peroneal occlusive disease. There is a high grade stenosis vs occlusion of the distal anterior tibial artery. R LISA: 0.11/-/-. Left: Severe diffuse disease noted throughout the femoral-popliteal arteries with high grade stenosis vs occlusion of the proximal-mid superficial femoral artery with reconstitution in the distal SFA. Evidence suggestive of TIb/Peroneal occlusive disease. There is high grade stenosis vs. occlusion of the entire posterior tibial artery. L LISA: 0.31/20/19.    Plan:   -CLTI w/ advanced calcific atherosclerotic AIOD and PAD w/ R hallux dry gangrene and recurrent R foot cellulitis   -BC (-) x5 days  -Continue ABX per primary team  -Podiatry consulted w/ plans to continue local wound care and repeat foot xray; input appreciated  -CTA abd w/ runoff shows occlusion of the R iliac system, nearly occlusive R CFA, occlusion of popliteal artery with recon of the below knee popliteal and tibials   -LEAD shows R LISA: 0.11/-/-  -Plan for R fem endart w/ retrograde R iliac intervention and fem to BK pop bypass in OR; scheduled for 7/24  -Cardiology consult appreciated. Per cardiology, \"risk for surgery is high however her risk if nothing is done regarding her foot wound is also very high as this will certainly cause complications down the road if not addressed.\"   -Continue ASA, brilinta and pravastatin for medical management  -Nephrology following for " "RICARDO on CKD; input appreciated  -Continue medical management per primary team  -Will discuss w/ Dr. Peterson      Subjective:  Pt seen for exam while sitting in her chair; NAD. Pt reports R toe feels much better and R foot is no longer swollen since starting ABX in the hospital. She reports chronic numbness/tingling sensation in B/L toes which she's had \"for years\" remains at baseline. Otherwise, pt reports feeling good and denies any further complaints at this time.     Vitals:  /72 (BP Location: Right arm)   Pulse 88   Temp 98.1 °F (36.7 °C) (Oral)   Resp 18   Ht 4' 10\" (1.473 m)   Wt 65.3 kg (144 lb)   LMP  (LMP Unknown)   SpO2 96%   BMI 30.10 kg/m²     I/Os:  I/O last 3 completed shifts:  In: 420 [P.O.:420]  Out: 1400 [Urine:1400]  I/O this shift:  In: 120 [P.O.:120]  Out: -     Lab Results and Cultures:   Lab Results   Component Value Date    WBC 13.02 (H) 07/22/2024    HGB 10.6 (L) 07/22/2024    HCT 32.2 (L) 07/22/2024    MCV 89 07/22/2024     07/22/2024     Lab Results   Component Value Date    GLUCOSE 130 09/07/2023    CALCIUM 8.3 (L) 07/22/2024    K 4.1 07/22/2024    CO2 24 07/22/2024     07/22/2024    BUN 21 07/22/2024    CREATININE 1.49 (H) 07/22/2024     Lab Results   Component Value Date    INR 1.17 07/16/2024    INR 0.92 04/18/2024    INR 0.96 09/08/2023    PROTIME 15.6 (H) 07/16/2024    PROTIME 12.9 04/18/2024    PROTIME 13.0 09/08/2023     Blood Culture:   Lab Results   Component Value Date    BLOODCX No Growth After 5 Days. 07/17/2024     Urinalysis:   Lab Results   Component Value Date    COLORU Light Yellow 07/17/2024    CLARITYU Clear 07/17/2024    SPECGRAV 1.016 07/17/2024    PHUR 5.5 07/17/2024    LEUKOCYTESUR Moderate (A) 07/17/2024    NITRITE Negative 07/17/2024    GLUCOSEU Negative 07/17/2024    KETONESU Negative 07/17/2024    BILIRUBINUR Negative 07/17/2024    BLOODU Negative 07/17/2024     Urine Culture:   Lab Results   Component Value Date    URINECX (A) " 07/17/2024     >100,000 cfu/ml - Lactobacillus gasseri Lactobacillus species    URINECX <10,000 cfu/ml Micrococcus luteus (A) 07/17/2024    URINECX (A) 07/17/2024     <10,000 cfu/ml - Lactobacillus rhamnosus Lactobacillus species       Medications:  Current Facility-Administered Medications   Medication Dose Route Frequency    acetaminophen (TYLENOL) tablet 975 mg  975 mg Oral Q8H JAVIER    allopurinol (ZYLOPRIM) tablet 100 mg  100 mg Oral Daily    amLODIPine (NORVASC) tablet 5 mg  5 mg Oral Daily    aspirin chewable tablet 81 mg  81 mg Oral Daily    ceFAZolin (ANCEF) IVPB (premix in dextrose) 1,000 mg 50 mL  1,000 mg Intravenous Q12H    [START ON 7/23/2024] cloNIDine (CATAPRES-TTS-3) 0.3 mg/24 hr TD weekly patch  1 patch Transdermal Weekly    escitalopram (LEXAPRO) tablet 20 mg  20 mg Oral Daily    heparin (porcine) subcutaneous injection 5,000 Units  5,000 Units Subcutaneous Q8H JAVIER    HYDROmorphone HCl (DILAUDID) injection 0.2 mg  0.2 mg Intravenous Q6H PRN    insulin lispro (HumALOG/ADMELOG) 100 units/mL subcutaneous injection 1-5 Units  1-5 Units Subcutaneous TID AC    lidocaine (LIDODERM) 5 % patch 1 patch  1 patch Topical Daily    metoprolol succinate (TOPROL-XL) 24 hr tablet 100 mg  100 mg Oral Daily    ondansetron (ZOFRAN) injection 4 mg  4 mg Intravenous Q6H PRN    oxyCODONE (ROXICODONE) IR tablet 5 mg  5 mg Oral Q6H PRN    pravastatin (PRAVACHOL) tablet 80 mg  80 mg Oral Daily With Dinner    sodium bicarbonate tablet 650 mg  650 mg Oral BID after meals    ticagrelor (BRILINTA) tablet 90 mg  90 mg Oral Q12H JAVIER    traZODone (DESYREL) tablet 50 mg  50 mg Oral HS       Imaging:  See above    Physical Exam:    General appearance: alert and oriented, in no acute distress  Skin: Skin color, texture, turgor normal. No rashes or lesions  Neurologic: Grossly normal  Head: Normocephalic, without obvious abnormality, atraumatic  Lungs: clear to auscultation bilaterally  Heart: regular rate and rhythm, S1, S2 normal, no  murmur, click, rub or gallop  Abdomen:  soft, non-tender  Extremities:  extremities normal, warm w/ toes slightly cool on R foot, trace pitting R foot edema, R great toe dry gangrene    Wound/Incision:  R hallux dry gangrene painted w/ betadine, open to air.       R foot    R foot    Pulse exam:  DP: Right:  absent  Left: doppler signal  PT: Right: faint monophasic doppler signal Left: doppler signal      GRACIELA Garcia  7/22/2024

## 2024-07-23 ENCOUNTER — APPOINTMENT (INPATIENT)
Dept: RADIOLOGY | Facility: HOSPITAL | Age: 79
DRG: 278 | End: 2024-07-23
Payer: COMMERCIAL

## 2024-07-23 ENCOUNTER — ANESTHESIA EVENT (INPATIENT)
Dept: PERIOP | Facility: HOSPITAL | Age: 79
DRG: 278 | End: 2024-07-23
Payer: COMMERCIAL

## 2024-07-23 LAB
ABO GROUP BLD: NORMAL
ALBUMIN SERPL BCG-MCNC: 3.2 G/DL (ref 3.5–5)
ALP SERPL-CCNC: 108 U/L (ref 34–104)
ALT SERPL W P-5'-P-CCNC: <3 U/L (ref 7–52)
ANION GAP SERPL CALCULATED.3IONS-SCNC: 9 MMOL/L (ref 4–13)
AST SERPL W P-5'-P-CCNC: 12 U/L (ref 13–39)
ATRIAL RATE: 101 BPM
BILIRUB SERPL-MCNC: 0.34 MG/DL (ref 0.2–1)
BLD GP AB SCN SERPL QL: NEGATIVE
BUN SERPL-MCNC: 22 MG/DL (ref 5–25)
CALCIUM ALBUM COR SERPL-MCNC: 9 MG/DL (ref 8.3–10.1)
CALCIUM SERPL-MCNC: 8.4 MG/DL (ref 8.4–10.2)
CHLORIDE SERPL-SCNC: 106 MMOL/L (ref 96–108)
CO2 SERPL-SCNC: 24 MMOL/L (ref 21–32)
CREAT SERPL-MCNC: 1.43 MG/DL (ref 0.6–1.3)
ERYTHROCYTE [DISTWIDTH] IN BLOOD BY AUTOMATED COUNT: 14.1 % (ref 11.6–15.1)
GFR SERPL CREATININE-BSD FRML MDRD: 35 ML/MIN/1.73SQ M
GLUCOSE SERPL-MCNC: 126 MG/DL (ref 65–140)
GLUCOSE SERPL-MCNC: 128 MG/DL (ref 65–140)
GLUCOSE SERPL-MCNC: 135 MG/DL (ref 65–140)
GLUCOSE SERPL-MCNC: 148 MG/DL (ref 65–140)
GLUCOSE SERPL-MCNC: 159 MG/DL (ref 65–140)
HCT VFR BLD AUTO: 30.3 % (ref 34.8–46.1)
HGB BLD-MCNC: 9.9 G/DL (ref 11.5–15.4)
MCH RBC QN AUTO: 28.7 PG (ref 26.8–34.3)
MCHC RBC AUTO-ENTMCNC: 32.7 G/DL (ref 31.4–37.4)
MCV RBC AUTO: 88 FL (ref 82–98)
P AXIS: 88 DEGREES
PLATELET # BLD AUTO: 315 THOUSANDS/UL (ref 149–390)
PMV BLD AUTO: 10.1 FL (ref 8.9–12.7)
POTASSIUM SERPL-SCNC: 4.1 MMOL/L (ref 3.5–5.3)
PR INTERVAL: 216 MS
PROT SERPL-MCNC: 6.7 G/DL (ref 6.4–8.4)
QRS AXIS: 115 DEGREES
QRSD INTERVAL: 152 MS
QT INTERVAL: 414 MS
QTC INTERVAL: 536 MS
RBC # BLD AUTO: 3.45 MILLION/UL (ref 3.81–5.12)
RH BLD: NEGATIVE
SODIUM SERPL-SCNC: 139 MMOL/L (ref 135–147)
SPECIMEN EXPIRATION DATE: NORMAL
T WAVE AXIS: 63 DEGREES
VENTRICULAR RATE: 101 BPM
WBC # BLD AUTO: 12.47 THOUSAND/UL (ref 4.31–10.16)

## 2024-07-23 PROCEDURE — 86901 BLOOD TYPING SEROLOGIC RH(D): CPT | Performed by: NURSE PRACTITIONER

## 2024-07-23 PROCEDURE — 93010 ELECTROCARDIOGRAM REPORT: CPT | Performed by: STUDENT IN AN ORGANIZED HEALTH CARE EDUCATION/TRAINING PROGRAM

## 2024-07-23 PROCEDURE — 86920 COMPATIBILITY TEST SPIN: CPT

## 2024-07-23 PROCEDURE — 99232 SBSQ HOSP IP/OBS MODERATE 35: CPT | Performed by: INTERNAL MEDICINE

## 2024-07-23 PROCEDURE — 99232 SBSQ HOSP IP/OBS MODERATE 35: CPT | Performed by: SURGERY

## 2024-07-23 PROCEDURE — 85027 COMPLETE CBC AUTOMATED: CPT | Performed by: INTERNAL MEDICINE

## 2024-07-23 PROCEDURE — 30233N1 TRANSFUSION OF NONAUTOLOGOUS RED BLOOD CELLS INTO PERIPHERAL VEIN, PERCUTANEOUS APPROACH: ICD-10-PCS | Performed by: INTERNAL MEDICINE

## 2024-07-23 PROCEDURE — 86850 RBC ANTIBODY SCREEN: CPT | Performed by: NURSE PRACTITIONER

## 2024-07-23 PROCEDURE — 82948 REAGENT STRIP/BLOOD GLUCOSE: CPT

## 2024-07-23 PROCEDURE — 86900 BLOOD TYPING SEROLOGIC ABO: CPT | Performed by: NURSE PRACTITIONER

## 2024-07-23 PROCEDURE — 80053 COMPREHEN METABOLIC PANEL: CPT | Performed by: INTERNAL MEDICINE

## 2024-07-23 PROCEDURE — 93005 ELECTROCARDIOGRAM TRACING: CPT

## 2024-07-23 PROCEDURE — 99232 SBSQ HOSP IP/OBS MODERATE 35: CPT | Performed by: STUDENT IN AN ORGANIZED HEALTH CARE EDUCATION/TRAINING PROGRAM

## 2024-07-23 PROCEDURE — 71045 X-RAY EXAM CHEST 1 VIEW: CPT

## 2024-07-23 RX ORDER — FUROSEMIDE 10 MG/ML
40 INJECTION INTRAMUSCULAR; INTRAVENOUS ONCE
Status: COMPLETED | OUTPATIENT
Start: 2024-07-23 | End: 2024-07-23

## 2024-07-23 RX ORDER — CHLORHEXIDINE GLUCONATE ORAL RINSE 1.2 MG/ML
15 SOLUTION DENTAL ONCE
Status: COMPLETED | OUTPATIENT
Start: 2024-07-24 | End: 2024-07-24

## 2024-07-23 RX ORDER — CHLORHEXIDINE GLUCONATE ORAL RINSE 1.2 MG/ML
15 SOLUTION DENTAL ONCE
Status: DISCONTINUED | OUTPATIENT
Start: 2024-07-23 | End: 2024-07-23

## 2024-07-23 RX ORDER — LORAZEPAM 0.5 MG/1
0.5 TABLET ORAL EVERY 8 HOURS PRN
Status: DISCONTINUED | OUTPATIENT
Start: 2024-07-23 | End: 2024-07-30

## 2024-07-23 RX ADMIN — ACETAMINOPHEN 975 MG: 325 TABLET, FILM COATED ORAL at 04:44

## 2024-07-23 RX ADMIN — HEPARIN SODIUM 5000 UNITS: 5000 INJECTION INTRAVENOUS; SUBCUTANEOUS at 21:34

## 2024-07-23 RX ADMIN — HEPARIN SODIUM 5000 UNITS: 5000 INJECTION INTRAVENOUS; SUBCUTANEOUS at 13:08

## 2024-07-23 RX ADMIN — LORAZEPAM 0.5 MG: 0.5 TABLET ORAL at 13:07

## 2024-07-23 RX ADMIN — TRAZODONE HYDROCHLORIDE 50 MG: 50 TABLET ORAL at 21:33

## 2024-07-23 RX ADMIN — ESCITALOPRAM OXALATE 20 MG: 20 TABLET ORAL at 08:25

## 2024-07-23 RX ADMIN — CEFAZOLIN SODIUM 1000 MG: 1 SOLUTION INTRAVENOUS at 21:53

## 2024-07-23 RX ADMIN — SODIUM BICARBONATE 650 MG TABLET 650 MG: at 08:25

## 2024-07-23 RX ADMIN — ACETAMINOPHEN 975 MG: 325 TABLET, FILM COATED ORAL at 21:31

## 2024-07-23 RX ADMIN — TICAGRELOR 90 MG: 90 TABLET ORAL at 21:29

## 2024-07-23 RX ADMIN — HEPARIN SODIUM 5000 UNITS: 5000 INJECTION INTRAVENOUS; SUBCUTANEOUS at 04:49

## 2024-07-23 RX ADMIN — LIDOCAINE 5% 1 PATCH: 700 PATCH TOPICAL at 00:24

## 2024-07-23 RX ADMIN — TICAGRELOR 90 MG: 90 TABLET ORAL at 08:25

## 2024-07-23 RX ADMIN — LIDOCAINE 5% 1 PATCH: 700 PATCH TOPICAL at 08:25

## 2024-07-23 RX ADMIN — CEFAZOLIN SODIUM 1000 MG: 1 SOLUTION INTRAVENOUS at 08:25

## 2024-07-23 RX ADMIN — PRAVASTATIN SODIUM 80 MG: 80 TABLET ORAL at 17:32

## 2024-07-23 RX ADMIN — FUROSEMIDE 40 MG: 10 INJECTION, SOLUTION INTRAMUSCULAR; INTRAVENOUS at 17:32

## 2024-07-23 RX ADMIN — ACETAMINOPHEN 975 MG: 325 TABLET, FILM COATED ORAL at 13:07

## 2024-07-23 RX ADMIN — INSULIN LISPRO 1 UNITS: 100 INJECTION, SOLUTION INTRAVENOUS; SUBCUTANEOUS at 17:32

## 2024-07-23 RX ADMIN — SODIUM BICARBONATE 650 MG TABLET 650 MG: at 17:32

## 2024-07-23 RX ADMIN — METOPROLOL SUCCINATE 100 MG: 50 TABLET, EXTENDED RELEASE ORAL at 08:25

## 2024-07-23 RX ADMIN — ASPIRIN 81 MG CHEWABLE TABLET 81 MG: 81 TABLET CHEWABLE at 08:25

## 2024-07-23 RX ADMIN — CLONIDINE 0.3 MG: 0.3 PATCH TRANSDERMAL at 08:25

## 2024-07-23 RX ADMIN — AMLODIPINE BESYLATE 5 MG: 5 TABLET ORAL at 08:25

## 2024-07-23 RX ADMIN — ALLOPURINOL 100 MG: 100 TABLET ORAL at 08:25

## 2024-07-23 NOTE — PROGRESS NOTES
[facility]  Progress Note  Name: Anamaria Parnell I  MRN: [id number]  Unit/Bed#: E5 -01 I Date of Admission: 7/19/2024   Date of Service: 7/23/2024 I Hospital Day: 4    Assessment & Plan   * Gangrene (Former[facility])  Assessment & Plan  History of PAD CAD diabetes and hypertension presented to [facility] for right great toe gangrene transferred here for vascular bypass.  On DAPT.  Vascular planning bypass on Wednesday.  X-ray with concerning findings for acute osteomyelitis  Discussed with podiatry.  Awaiting vascular intervention before podiatric surgery  Continue cefazolin    Coronary artery disease of native artery of native heart with stable angina pectoris (HCC)  Assessment & Plan  CAD with history of PCI.  No chest pain.  On DAPT  Seen in consultation with cardiology for preoperative risk assessment    Primary hypertension  Assessment & Plan  Blood pressures are stable.  Continue clonidine patch and amlodipine   Labetalol changed to metoprolol by cardiology    Type 2 diabetes mellitus, without long-term current use of insulin (Former[facility])  Assessment & Plan  Lab Results   Component Value Date    HGBA1C 5.9 (H) 07/09/2024     Recent Labs     07/22/24  1110 07/22/24  1606 07/22/24  2124 07/23/24  0713   POCGLU 121 125 108 126     Prior to admission on Tradjenta.  Currently on sliding scale insulin.      Depression, recurrent ([facility])  Assessment & Plan  Continue on escitalopram  Is having slight anxiety about upcoming procedure.      Stage 4 chronic kidney disease ([facility])  Assessment & Plan  Baseline creatinine 1.2-1.8.  Had kidney injury at [facility].  Nephrology following.  Renal function stable now.    Results from last 7 days   Lab Units 07/23/24  0444 07/22/24  0541 07/20/24  0551 07/19/24  0518 07/18/24  0537 07/17/24  0512 07/16/24  1738   BUN mg/dL 22 21 19 20 22 31* 35*   CREATININE mg/dL 1.43* 1.49* 1.49* 1.53* 1.53* 1.79* 2.19*   EGFR ml/min/1.73sq m 35 33 33 32 32 26 21       VTE  "Pharmacologic Prophylaxis: VTE Score: 3 Moderate Risk (Score 3-4) - Pharmacological DVT Prophylaxis Ordered: heparin.    Mobility:  Basic Mobility Inpatient Raw Score: 20  JH-HLM Goal: 6: Walk 10 steps or more  JH-HLM Achieved: 4: Move to chair/commode  JH-HLM Goal NOT achieved. Continue with multidisciplinary rounding and encourage appropriate mobility to improve upon JH-HLM goals.    Patient Centered Rounds: I have performed bedside rounds with nursing staff today.  Discussions with Specialists or Other Care Team Provider: Case management and vascular surgery    Education and Discussions with Family / Patient: Updated  (son) via phone.    Time Spent for Care:   This time was spent on one or more of the following: performing physical exam; counseling and coordination of care; obtaining or reviewing history; documenting in the medical record; reviewing/ordering tests, medications or procedures; communicating with other healthcare professionals and discussing with patient's family/caregivers.    Current Length of Stay: 4 day(s)  Current Patient Status: Inpatient   Certification Statement: The patient will continue to require additional inpatient hospital stay due to vascular surgery tomorrow  Discharge Plan: Anticipate discharge in >72 hrs to home with home services.    Code Status: Level 3 - DNAR and DNI      Subjective:   Patient seen and examined.  Did have some cough nonproductive.  Increased anxiety    Objective:   Vitals: Blood pressure 136/73, pulse 91, temperature 98.1 °F (36.7 °C), temperature source Oral, resp. rate 18, height 4' 10\" (1.473 m), weight 65.3 kg (144 lb), SpO2 93%.    Intake/Output Summary (Last 24 hours) at 7/23/2024 1040  Last data filed at 7/23/2024 0837  Gross per 24 hour   Intake 240 ml   Output 600 ml   Net -360 ml       Physical Exam  Vitals reviewed.   Constitutional:       General: She is not in acute distress.     Appearance: Normal appearance.   HENT:      Head: " Atraumatic.   Eyes:      General: No scleral icterus.  Cardiovascular:      Rate and Rhythm: Regular rhythm.      Heart sounds: Normal heart sounds.   Pulmonary:      Breath sounds: Decreased breath sounds present. No wheezing.   Abdominal:      General: Bowel sounds are normal.      Palpations: Abdomen is soft.      Tenderness: There is no guarding or rebound.   Musculoskeletal:         General: No swelling.      Comments: Gangrene left big toe   Skin:     General: Skin is warm.   Neurological:      General: No focal deficit present.      Mental Status: She is alert.   Psychiatric:         Mood and Affect: Mood normal.       Additional Data:   Labs:  Results from last 7 days   Lab Units 07/23/24  0444 07/22/24  0541 07/20/24  0551 07/17/24  0156 07/16/24  1738   WBC Thousand/uL 12.47* 13.02* 11.18*   < > 14.08*   HEMOGLOBIN g/dL 9.9* 10.6* 9.9*   < > 11.1*   PLATELETS Thousands/uL 315 351 343   < > 397*   MCV fL 88 89 89   < > 88   INR   --   --   --   --  1.17    < > = values in this interval not displayed.     Results from last 7 days   Lab Units 07/23/24  0444 07/22/24  0541 07/20/24  0551 07/17/24  0512 07/16/24  1738   SODIUM mmol/L 139 138 139   < > 140   POTASSIUM mmol/L 4.1 4.1 3.9   < > 4.4   CHLORIDE mmol/L 106 106 108   < > 104   CO2 mmol/L 24 24 19*   < > 21   ANION GAP mmol/L 9 8 12   < > 15*   BUN mg/dL 22 21 19   < > 35*   CREATININE mg/dL 1.43* 1.49* 1.49*   < > 2.19*   CALCIUM mg/dL 8.4 8.3* 8.0*   < > 8.8   ALBUMIN g/dL 3.2*  --   --   --  3.8   TOTAL BILIRUBIN mg/dL 0.34  --   --   --  0.38   ALK PHOS U/L 108*  --   --   --  96   ALT U/L <3*  --   --   --  4*   AST U/L 12*  --   --   --  7*   EGFR ml/min/1.73sq m 35 33 33   < > 21   GLUCOSE RANDOM mg/dL 135 118 84   < > 106    < > = values in this interval not displayed.                          Results from last 7 days   Lab Units 07/23/24  0713 07/22/24  2124 07/22/24  1606 07/22/24  1110 07/22/24  0705 07/21/24  2108 07/21/24  1614  07/21/24  1108 07/21/24  0707 07/20/24  2045 07/20/24  1613 07/20/24  1117   POC GLUCOSE mg/dl 126 108 125 121 138 118 113 95 98 148* 142* 135             * I Have Reviewed All Lab Data Listed Above.    Recent cultures:   Results from last 7 days   Lab Units 07/17/24  0220 07/17/24  0156   BLOOD CULTURE   --  No Growth After 5 Days.   URINE CULTURE  >100,000 cfu/ml - Lactobacillus gasseri Lactobacillus species*  <10,000 cfu/ml Micrococcus luteus*  <10,000 cfu/ml - Lactobacillus rhamnosus Lactobacillus species*  --                  Lines/Drains:  Invasive Devices       Peripheral Intravenous Line  Duration             Peripheral IV 07/21/24 Distal;Dorsal (posterior);Right Forearm 2 days                          Telemetry:      Imaging:  Imaging Reports Reviewed Today Include:   XR foot 3+ vw right    Result Date: 7/22/2024  Impression: Cortical destruction along the ventral aspect of the first distal phalanx worrisome for acute osteomyelitis. Large air within the overlying soft tissues. The study was marked in EPIC for immediate notification. Workstation performed: [id number]       Scheduled Meds:  Current Facility-Administered Medications   Medication Dose Route Frequency Provider Last Rate    acetaminophen  975 mg Oral Q8H [facility] Kimberly Geiger MD      allopurinol  100 mg Oral Daily Kimberly Geiger MD      amLODIPine  5 mg Oral Daily GRACIELA Alvares      aspirin  81 mg Oral Daily Kimberly Geiger MD      cefazolin  1,000 mg Intravenous Q12H Kimberly Geiger MD 1,000 mg (07/23/24 0825)    cloNIDine  1 patch Transdermal Weekly Kimberly Geiger MD      escitalopram  20 mg Oral Daily Kimberly Geiger MD      heparin (porcine)  5,000 Units Subcutaneous Q8H [facility] Kimberly Geiger MD      HYDROmorphone  0.2 mg Intravenous Q6H PRN Kimberly Geiger MD      insulin lispro  1-5 Units Subcutaneous TID AC Kimberly Geiger MD      lidocaine  1 patch Topical Daily Kimberly Geiger MD      metoprolol succinate  100 mg Oral Daily Timothy Rey DO      ondansetron  4 mg Intravenous  Q6H PRN Kimberly Geiger MD      oxyCODONE  5 mg Oral Q6H PRN Kimberly Geiger MD      pravastatin  80 mg Oral Daily With Dinner Kimberly Geiger MD      sodium bicarbonate  650 mg Oral BID after meals GRACIELA Alvares      ticagrelor  90 mg Oral Q12H [facility] Kimberly Geiger MD      traZODone  50 mg Oral HS Kimberly Geiger MD         Today, Patient Was Seen By: Akira Flores DO    ** Please Note: Dictation voice to text software may have been used in the creation of this document. **

## 2024-07-23 NOTE — ASSESSMENT & PLAN NOTE
78 yo female ex-smoker w/ a hx of obesity, CKD IV, DM II, HLD, HTN, CAD, L MCA and PCA CVA S/P L TCAR 9/7/23 (Sabino), FÉLIX, mesenteric artery stenosis and aortoiliac occlusive disease w/ PAD and chronic R hallux dry gangrene presented to [facility] on 7/16/24 w/ worsening R great toe pain and R hallux gangrene. Pt seen in consult by nephrology who continues to follow pt for RICARDO on CKD. Pt was transferred to [facility] on 7/19 w/ plans for R femoral endarterectomy w/ retrograde stenting and left leg bypass, which may have to be staged surgeries based on her comorbid conditions. Pt seen in consult by podiatry for R hallux dry gangrene. Xray R foot (+) OM. Podiatry plans local wound care w/ re-eval after revascularization.    Vascular surgery consulted for possible revascularization options for known CLTI w/ AIOD, PAD and R hallux dry gangrene. Pt follows w/ vascular surgery for PAD and chronic limb ischemia; last seen in the office on 6/4/24. Imaging shows extensive AIOD and peripheral arterial disease with occlusion of the R iliac system, nearly occlusive R CFA, and occlusion of popliteal artery with recon of the below knee popliteal and tibials. R LISA: 0.11/-/-. Plan for R fem endart w/ retrograde iliac intervention and R femoral to BK pop bypass w/ GSV tomorrow (7/24).    Diagnostics:  -7/22/24 Xray R foot: Cortical destruction along the ventral aspect of the first distal phalanx worrisome for acute osteomyelitis. Large air within the overlying soft tissues.   -7/21/24 Echo: Left ventricular cavity size is normal. Wall thickness is severely increased. The left ventricular ejection fraction is 41% by biplane measurement. Systolic function is mildly reduced. Global longitudinal strain is reduced at -10% with moderate to severe strain reduction in the ED mid to basal anteroseptal septal and inferior walls.. Unable to grade diastolic dysfunction given the severe degree of mitral annular calcification.   -7/18/24 LE vein mapping:  Right: The great saphenous vein is patent  from the groin to the ankle. The intraluminal diameter measurements range from 2.2mm to 6.4mm throughout. Left: The great saphenous vein is patent from the groin to the ankle. The intraluminal diameter measurements range from 1.3mm to 6.8mm throughout.  -7/18/24 CTA abd w/ runoff: Visualized descending thoracic and suprarenal abdominal aorta demonstrate marked soft plaque with multifocal regions of plaque ulceration. A point of relative stenosis within the infrarenal abdominal aorta measures up to 7 mm in diameter. Bilateral multifocal severe stenosis within the external iliac and common femoral arteries. Bilateral long segment SFA occlusion extending from the ostia to the level of the P1 popliteal artery. Three-vessel runoff on the right, the posterior tibial artery is dominant. Two-vessel runoff on the left, the peroneal artery is dominant. Segmental visualization of the posterior tibial artery. Interval increase in size of an exophytic lesion arising medially from the stomach, again consistent with gastrointestinal stromal tumor. New dilation of the pancreatic duct within the pancreatic head, no obvious pancreatic/ampulla lesion identified.  -7/16/24 LEAD: Right: Severe diffuse disease noted throughout the femoral-popliteal arteries with high grade stenosis vs occlusion of the proximal-distal superficial femoral artery with reconstitution to the popliteal artery. Evidence suggestive of TIb/Peroneal occlusive disease. There is a high grade stenosis vs occlusion of the distal anterior tibial artery. R LISA: 0.11/-/-. Left: Severe diffuse disease noted throughout the femoral-popliteal arteries with high grade stenosis vs occlusion of the proximal-mid superficial femoral artery with reconstitution in the distal SFA. Evidence suggestive of TIb/Peroneal occlusive disease. There is high grade stenosis vs. occlusion of the entire posterior tibial artery. L LISA:  "0.31/20/19.    Plan:   -CLTI w/ advanced calcific atherosclerotic AIOD and PAD w/ R hallux dry gangrene (+) OM and recurrent R foot cellulitis   -BC (-) x5 days  -Continue ABX per primary team  -Xray R foot (+) OM  -Podiatry following w/ plans to continue local wound care; input appreciated  -CTA abd w/ runoff shows occlusion of the R iliac system, nearly occlusive R CFA, occlusion of popliteal artery with recon of the below knee popliteal and tibials   -LEAD shows R LISA: 0.11/-/-  -Plan for R fem endart w/ retrograde R iliac intervention and fem to BK pop bypass in OR; scheduled for Wed (7/24)  -Please keep pt NPO after MN tonight for OR tomorrow  -Cardiology consult appreciated. Per cardiology, \"risk for surgery is high however her risk if nothing is done regarding her foot wound is also very high as this will certainly cause complications down the road if not addressed.\"   -Continue ASA, brilinta and pravastatin for medical management  -Nephrology following for RICARDO on CKD; input appreciated  -Continue medical management per primary team  -Will discuss w/ Dr. Sin  "

## 2024-07-23 NOTE — UTILIZATION REVIEW
NOTIFICATION OF ADMISSION DISCHARGE   This is a Notification of Discharge from [facility]. Please be advised that this patient has been discharge from our facility. Below you will find the admission and discharge date and time including the patient’s disposition.   UTILIZATION REVIEW CONTACT:  Hannah Busch  Utilization   Network Utilization Review Department  Phone: [phone] x carefully listen to the prompts. All voicemails are confidential.  Email: [email or web address]     ADMISSION INFORMATION  PRESENTATION DATE: 7/16/2024  5:12 PM  OBERVATION ADMISSION DATE: N/A  INPATIENT ADMISSION DATE: 7/16/24  7:38 PM   DISCHARGE DATE: 7/19/2024  3:30 PM   DISPOSITION:[facility] Utilization Review Department  ATTENTION: Please call with any questions or concerns to [phone] and carefully listen to the prompts so that you are directed to the right person. All voicemails are confidential.   For Discharge needs, contact Care Management DC Support Team at [phone] opt. 2  Send all requests for admission clinical reviews, approved or denied determinations and any other requests to dedicated fax number below belonging to the campus where the patient is receiving treatment. List of dedicated fax numbers for the Facilities:  FACILITY NAME UR FAX NUMBER   ADMISSION DENIALS (Administrative/Medical Necessity) [phone]   DISCHARGE SUPPORT TEAM ([facility]) [phone]   PARENT CHILD HEALTH (Maternity/NICU/Pediatrics) [phone]   [facility] [phone]   [facility] [phone]   [facility] [phone]   [facility] [phone]   [facility] [phone]   [facility] [phone]   [facility] [phone]   [facility]  [place] [phone]   [facility] [phone]   [facility] [phone]   [facility] [phone]   [facility] [phone]

## 2024-07-23 NOTE — ASSESSMENT & PLAN NOTE
Baseline creatinine 1.2-1.8.  Had kidney injury at [facility].  Nephrology following.  Renal function stable now.    Results from last 7 days   Lab Units 07/23/24  0444 07/22/24  0541 07/20/24  0551 07/19/24  0518 07/18/24  0537 07/17/24  0512 07/16/24  1738   BUN mg/dL 22 21 19 20 22 31* 35*   CREATININE mg/dL 1.43* 1.49* 1.49* 1.53* 1.53* 1.79* 2.19*   EGFR ml/min/1.73sq m 35 33 33 32 32 26 21

## 2024-07-23 NOTE — PROGRESS NOTES
[facility]  Progress Note  Name: Anamaria Parnell I  MRN: [id number]  Unit/Bed#: E5 -01 I Date of Admission: 7/19/2024   Date of Service: 7/23/2024 I Hospital Day: 4    Assessment & Plan   Coronary artery disease of native artery of native heart with stable angina pectoris (HCC)  Patient had mid-sternal chest pain with SOB this afternoon. HR to 120 bpm on Kimo.  EKG no acute ischemia. Doubt angina. Prefer anxiety.  2 L O2 via NC placed for comfort  Small dose anxiolytic per SLIM  CXR ordered. Will consider diuretic based on result.  Encouraged incentive spirometer.      Pre-operative cardiac risk assessment - can still proceed with operation as planned tomorrow. Patient and surgeon understand the high risk for cardiac complication. No cardiac testing planned prior to surgery.    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  Patient states she has been good this entire hospitalization and just now that chest pain came on.  Nurse reports at that time patient's heart rate was elevated at 120 bpm.  Patient denies pleurisy, cough, nausea, epigastric pain or diaphoresis.  She ate lunch and denies abdominal pain.  Presently after taking some time to take deep breaths, wearing supplemental O2 and just receiving the small dose of Ativan and Tylenol, patient feels like her chest pain has resolved and her breathing is becoming  easier.    Vitals:  Vitals:    07/19/24 1715 07/21/24 0845   Weight: 65.3 kg (144 lb) 65.3 kg (144 lb)   ,  Vitals:    07/22/24 0706 07/22/24 1507 07/22/24 2248 07/23/24 0711   BP: 117/72 154/62 140/66 136/73   BP Location: Right arm Right arm  Left arm   Pulse: 88 82 91    Resp: 18 16  18   Temp: 98.1 °F (36.7 °C) 98.4 °F (36.9 °C) 98.4 °F (36.9 °C) 98.1 °F (36.7 °C)   TempSrc: Oral Oral  Oral   SpO2: 96% 96% 93%    Weight:       Height:         Exam:  Physical Exam  Vitals and nursing note reviewed.   Constitutional:       General: She is not in acute distress.  HENT:      Head: Normocephalic and atraumatic.      Nose: Nose normal.      Mouth/Throat:      Mouth: Mucous membranes are moist.   Eyes:      Conjunctiva/sclera: Conjunctivae normal.   Neck:      Comments: - jvd  Cardiovascular:      Rate and Rhythm: Regular rhythm. Tachycardia present.      Heart sounds: S1 normal and S2 normal. No murmur heard.     Comments: Hr approx 100 bpm  Pulmonary:      Effort: Pulmonary effort is normal.      Breath sounds: No rhonchi.      Comments: Faint Crackles bl bases    Tachypnea.     Wearing 2 L O2 via nc with spo2 97% resting  Chest:      Chest wall: No tenderness.   Abdominal:      General: Abdomen is flat.   Musculoskeletal:      Comments: Right leg swelling   Skin:     Comments: Gangrene right great toe   Neurological:      Mental Status: She is alert and oriented to person, place, and time.   Psychiatric:         Behavior: Behavior normal.     Medications:    Current Facility-Administered Medications:     acetaminophen (TYLENOL) tablet 975 mg, 975 mg, Oral, Q8H JAVIER, Kimberly Geiger MD, 975 mg at 07/23/24 1307    allopurinol (ZYLOPRIM) tablet 100 mg, 100 mg, Oral, Daily, Kimberly Geiger MD, 100 mg at 07/23/24 0825    amLODIPine (NORVASC) tablet 5 mg, 5 mg, Oral, Daily, GRACIELA Alvares, 5 mg at 07/23/24 0825    aspirin chewable tablet 81 mg, 81 mg, Oral, Daily, Kimberly Geiger MD, 81 mg at 07/23/24 0825    ceFAZolin (ANCEF) IVPB  (premix in dextrose) 1,000 mg 50 mL, 1,000 mg, Intravenous, Q12H, Kimberly Geiger MD, Last Rate: 100 mL/hr at 07/23/24 0825, 1,000 mg at 07/23/24 0825    [START ON 7/24/2024] chlorhexidine (PERIDEX) 0.12 % oral rinse 15 mL, 15 mL, Swish & Spit, Once, GRACIELA Alvares    cloNIDine (CATAPRES-TTS-3) 0.3 mg/24 hr TD weekly patch, 1 patch, Transdermal, Weekly, Kimberly Geiger MD, 0.3 mg at 07/23/24 0825    escitalopram (LEXAPRO) tablet 20 mg, 20 mg, Oral, Daily, Kimberly Geiger MD, 20 mg at 07/23/24 0825    heparin (porcine) subcutaneous injection 5,000 Units, 5,000 Units, Subcutaneous, Q8H JAVIER, Kimberly Geiger MD, 5,000 Units at 07/23/24 1308    HYDROmorphone HCl (DILAUDID) injection 0.2 mg, 0.2 mg, Intravenous, Q6H PRN, Kimberly Geiger MD    insulin lispro (HumALOG/ADMELOG) 100 units/mL subcutaneous injection 1-5 Units, 1-5 Units, Subcutaneous, TID AC **AND** Fingerstick Glucose (POCT), , , TID AC, Kimberly Geiger MD    lidocaine (LIDODERM) 5 % patch 1 patch, 1 patch, Topical, Daily, Kimberly Geiger MD, 1 patch at 07/23/24 0825    LORazepam (ATIVAN) tablet 0.5 mg, 0.5 mg, Oral, Q8H PRN, Akira Flores DO, 0.5 mg at 07/23/24 1307    metoprolol succinate (TOPROL-XL) 24 hr tablet 100 mg, 100 mg, Oral, Daily, Timothy Rey DO, 100 mg at 07/23/24 0825    ondansetron (ZOFRAN) injection 4 mg, 4 mg, Intravenous, Q6H PRN, Kimberly Geiger MD    oxyCODONE (ROXICODONE) IR tablet 5 mg, 5 mg, Oral, Q6H PRN, Kimberly Geiger MD, 5 mg at 07/22/24 0827    pravastatin (PRAVACHOL) tablet 80 mg, 80 mg, Oral, Daily With Dinner, Kimberly Geiger MD, 80 mg at 07/22/24 1530    sodium bicarbonate tablet 650 mg, 650 mg, Oral, BID after meals, GRACIELA Alvares, 650 mg at 07/23/24 0825    ticagrelor (BRILINTA) tablet 90 mg, 90 mg, Oral, Q12H JAVIER, Kimberly Geiger MD, 90 mg at 07/23/24 0825    traZODone (DESYREL) tablet 50 mg, 50 mg, Oral, HS, Kimberly Geiger MD, 50 mg at 07/22/24 5886    Labs/Data:        Results from last 7 days   Lab Units 07/23/24  0444 07/22/24  0541 07/20/24  0551   WBC  Thousand/uL 12.47* 13.02* 11.18*   HEMOGLOBIN g/dL 9.9* 10.6* 9.9*   HEMATOCRIT % 30.3* 32.2* 30.4*   PLATELETS Thousands/uL 315 351 343     Results from last 7 days   Lab Units 24  0444 24  0541 24  0551   POTASSIUM mmol/L 4.1 4.1 3.9   CHLORIDE mmol/L 106 106 108   CO2 mmol/L  19*   BUN mg/dL    CREATININE mg/dL 1.43* 1.49* 1.49*     EK2024 12:45 PM EG today with sinus tachycardia heart rate 101 bpm.  Normal AK and QTC intervals.  Left bundle branch block.  Normal axis.  No ventricular hypertrophy or atrial enlargement.  Evidence of old septal infarct.  Nonspecific ST changes.    Dennise Campbell PA-C

## 2024-07-23 NOTE — CASE MANAGEMENT
Case Management Discharge Planning Note    Patient name Anamaria Parnell  Location [facility] /E5 -* MRN [id number]  : 1945 Date 2024       Current Admission Date: 2024  Current Admission Diagnosis:Gangrene (HCC)   Patient Active Problem List    Diagnosis Date Noted Date Diagnosed    Coronary artery disease of native artery of native heart with stable angina pectoris (HCC) 2024     Cellulitis of toe of right foot 2024     Sepsis (HCC) 2024     Gangrene (HCC) 2024     Diabetic ulcer of toe of right foot associated with diabetes mellitus due to underlying condition, limited to breakdown of skin (HCC) 2024     Carotid stenosis, asymptomatic, right 10/27/2023     Complex renal cyst 10/18/2023     Encounter for follow-up surveillance of urothelial carcinoma of lower urinary tract 10/18/2023     Encounter to discuss test results 10/17/2023     Smoking 2023     Renal cyst 2023     Primary hypertension 2023     Dysarthria 08/15/2023     Stage 3b chronic kidney disease (HCC)      Aortoiliac occlusive disease (HCC) 10/11/2022     History of gastrointestinal stromal tumor (GIST) 2022     Secondary hyperparathyroidism of renal origin (HCC) 2022     Gastrointestinal stromal tumor (GIST) (HCC) 2022     Type 2 diabetes mellitus, without long-term current use of insulin (HCC) 2021     Type 2 diabetes mellitus with diabetic peripheral angiopathy without gangrene (HCC) 2021     Depression, recurrent (HCC) 2021     Stage 4 chronic kidney disease (HCC) 2020     Hypertensive kidney disease with stage 4 chronic kidney disease (HCC) 2020     Cervical radiculopathy 2020     Malignant neoplasm of overlapping sites of bladder (HCC) 2020     Stenosis of left anterior descending artery Mid LAD 50-60% stenosis 2020     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  circ 04/01/2020     Pulmonary nodule 7 mm groundglass opacity in the right upper lobe, unchanged since October 2019 03/19/2020     Atherosclerosis of native arteries of right leg with ulceration of other part of foot ([facility]) 03/03/2020     Symptomatic carotid artery stenosis, left 03/03/2020     Femoral artery stenosis, right ([facility]) 50-75% stenosis in the common femoral artery. 01/14/2020     Mesenteric artery stenosis (HCC) 01/14/2020     Positive cardiac stress test  small, mildly severe, partially reversible myocardial perfusion defect of anterior and inferior wall  11/12/2019     Abnormal CT of the chest suspicious for an infectious or inflammatory bronchiolitis. 11/07/2019     Celiac artery stenosis ([facility]) >70% stenosis in the celiac trunk 11/05/2019     Aortoiliac stenosis, left ([facility]) 02/25/2019     Spondylosis of lumbar region without myelopathy or radiculopathy 01/29/2019     Lumbosacral spondylosis without myelopathy 01/29/2019     Statin intolerance 01/22/2019     Lumbar disc herniation 01/22/2019     Herniated lumbar intervertebral disc 01/22/2019     Chronic GERD 12/04/2017     Acute renal failure superimposed on stage 3 chronic kidney disease ([facility]) 12/04/2017     Claudication in peripheral vascular disease ([facility]) 12/04/2017     Gout 12/04/2017     Hx-TIA (transient ischemic attack) 12/04/2017     Hyperlipidemia associated with type 2 diabetes mellitus  ([facility]) 12/04/2017     Hypertension associated with diabetes  ([facility]) 12/04/2017     Osteoarthritis 12/04/2017     Right renal artery stenosis ([facility]) 12/04/2017     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     Hyperlipidemia, unspecified 12/04/2017     Vitamin D deficiency 09/08/2016     Basilar artery stenosis 06/22/2016     Type 2 diabetes mellitus with diabetic nephropathy ([facility]) 12/19/2015     Hypercholesteremia 12/19/2015     Hypertension 12/19/2015     CVA (cerebral vascular accident) ([facility]) 11/09/2015       LOS (days): 4  Geometric Mean LOS (GMLOS) (days):  3.1  Days to GMLOS:-0.8     OBJECTIVE:  Risk of Unplanned Readmission Score: 27.98         Current admission status: Inpatient   Preferred Pharmacy:   [id number] - [place] - [address]  [address]  [place] [id number]  Phone: [phone] Fax: [phone]    Primary Care Provider: Ritchie Lawton MD    Primary Insurance: [facility]  Secondary Insurance:     DISCHARGE DETAILS:            Additional Comments: Spoke with patient's son Austin, emailed him the list of accepting rehab facilities. [email or web address]

## 2024-07-23 NOTE — ASSESSMENT & PLAN NOTE
History of PAD CAD diabetes and hypertension presented to [facility] for right great toe gangrene transferred here for vascular bypass.  On DAPT.  Vascular planning bypass on Wednesday.  X-ray with concerning findings for acute osteomyelitis  Discussed with podiatry.  Awaiting vascular intervention before podiatric surgery  Continue cefazolin

## 2024-07-23 NOTE — PROGRESS NOTES
NEPHROLOGY PROGRESS NOTE   Anamaria Parnell 79 y.o. female MRN: [id number]  Unit/Bed#: E5 -01 Encounter: [id number]      ASSESSMENT & PLAN:  #1 RICARDO on top of CKD stage IIIb/IV.  Baseline creatinine around 1.4-1.7, follow-up with Dr. NAYE Longoria as an outpatient.  RICARDO in the setting of sepsis with soft tissue infection, creatinine on admission 2.1, kidney function improved with creatinine at 1.43 this morning  Continue current regimen, keep holding losartan in anticipation of vascular procedure on 7/24.  Patient will need IV fluid pre and postop as per protocol.  Follow daily labs    #2 right hallux gangrene on the patient with history of PAD, vascular surgery on board, plan for right femoral endarterectomy with retrograde right iliac intervention and femoral to BK pop bypass on 7/24    3 metabolic acidosis in the setting of CKD, improved, continue with bicarbonate tablets    #4 hypertension, blood pressure remains stable on current regimen, keep holding ARB in anticipation of vascular procedure tomorrow    5 anemia, suspected secondary of chronic kidney disease, globin low at 9.9, monitor H&H and transfuse for hemoglobin less than 7        SUBJECTIVE:  Seen and examined, pain this morning better controlled, denies any chest pain, no shortness of breath, no abdominal pain, no nausea vomiting    OBJECTIVE:  Current Weight: Weight - Scale: 65.3 kg (144 lb)  Vitals:    07/23/24 0711   BP: 136/73   Pulse:    Resp: 18   Temp: 98.1 °F (36.7 °C)   SpO2:        Intake/Output Summary (Last 24 hours) at 7/23/2024 0840  Last data filed at 7/23/2024 0837  Gross per 24 hour   Intake 240 ml   Output 600 ml   Net -360 ml       General: conscious, cooperative, in not acute distress  Eyes: conjunctivae pink, anicteric sclerae  ENT: lips and mucous membranes moist  Neck: supple, no JVD  Chest: clear breath sounds bilateral, no crackles, ronchus or wheezings  CVS: distinct S1 & S2, normal rate, regular rhythm  Abdomen: soft,  non-tender, non-distended, normoactive bowel sounds  Extremities: no significant edema of both legs, necrotic right first toe  Skin: no rash  Neuro: awake, alert, oriented        Medications:    Current Facility-Administered Medications:     acetaminophen (TYLENOL) tablet 975 mg, 975 mg, Oral, Q8H [facility], Kimberly Geiger MD, 975 mg at 07/23/24 0444    allopurinol (ZYLOPRIM) tablet 100 mg, 100 mg, Oral, Daily, Kimberly Geiger MD, 100 mg at 07/23/24 0825    amLODIPine (NORVASC) tablet 5 mg, 5 mg, Oral, Daily, Angie De La Garza CRNP, 5 mg at 07/23/24 0825    aspirin chewable tablet 81 mg, 81 mg, Oral, Daily, Kimberly Geiger MD, 81 mg at 07/23/24 0825    ceFAZolin (ANCEF) IVPB (premix in dextrose) 1,000 mg 50 mL, 1,000 mg, Intravenous, Q12H, Kimberly Geiger MD, Last Rate: 100 mL/hr at 07/23/24 0825, 1,000 mg at 07/23/24 0825    cloNIDine (CATAPRES-TTS-3) 0.3 mg/24 hr TD weekly patch, 1 patch, Transdermal, Weekly, Kimberly Geiger MD, 0.3 mg at 07/23/24 0825    escitalopram (LEXAPRO) tablet 20 mg, 20 mg, Oral, Daily, Kimberly Geiger MD, 20 mg at 07/23/24 0825    heparin (porcine) subcutaneous injection 5,000 Units, 5,000 Units, Subcutaneous, Q8H [facility], Kimberly Geiger MD, 5,000 Units at 07/23/24 0449    HYDROmorphone HCl (DILAUDID) injection 0.2 mg, 0.2 mg, Intravenous, Q6H PRN, Kimberly Geiger MD    insulin lispro (HumALOG/ADMELOG) 100 units/mL subcutaneous injection 1-5 Units, 1-5 Units, Subcutaneous, TID AC **AND** Fingerstick Glucose (POCT), , , TID AC, Kimberly Geiger MD    lidocaine (LIDODERM) 5 % patch 1 patch, 1 patch, Topical, Daily, Kimberly Geiger MD, 1 patch at 07/23/24 0825    metoprolol succinate (TOPROL-XL) 24 hr tablet 100 mg, 100 mg, Oral, Daily, Timothy Rey DO, 100 mg at 07/23/24 0825    ondansetron (ZOFRAN) injection 4 mg, 4 mg, Intravenous, Q6H PRN, Kimberly Geiger MD    oxyCODONE (ROXICODONE) IR tablet 5 mg, 5 mg, Oral, Q6H PRN, Kimberly Geiger MD, 5 mg at 07/22/24 0827    pravastatin (PRAVACHOL) tablet 80 mg, 80 mg, Oral, Daily With Dinner, Kimberly Geiger MD, 80  "mg at 07/22/24 1530    sodium bicarbonate tablet 650 mg, 650 mg, Oral, BID after meals, GRACIELA Alvares, 650 mg at 07/23/24 0825    ticagrelor (BRILINTA) tablet 90 mg, 90 mg, Oral, Q12H [facility], Kimberly Geiger MD, 90 mg at 07/23/24 0825    traZODone (DESYREL) tablet 50 mg, 50 mg, Oral, HS, Kimberly Geiger MD, 50 mg at 07/22/24 2159    Invasive Devices:        Lab Results:   Results from last 7 days   Lab Units 07/23/24  0444 07/22/24  0541 07/20/24  0551 07/17/24  0156 07/16/24  1738   WBC Thousand/uL 12.47* 13.02* 11.18*   < > 14.08*   HEMOGLOBIN g/dL 9.9* 10.6* 9.9*   < > 11.1*   HEMATOCRIT % 30.3* 32.2* 30.4*   < > 33.5*   PLATELETS Thousands/uL 315 351 343   < > 397*   SODIUM mmol/L 139 138 139   < > 140   POTASSIUM mmol/L 4.1 4.1 3.9   < > 4.4   CHLORIDE mmol/L 106 106 108   < > 104   CO2 mmol/L 24 24 19*   < > 21   BUN mg/dL 22 21 19   < > 35*   CREATININE mg/dL 1.43* 1.49* 1.49*   < > 2.19*   CALCIUM mg/dL 8.4 8.3* 8.0*   < > 8.8   ALK PHOS U/L 108*  --   --   --  96   ALT U/L <3*  --   --   --  4*   AST U/L 12*  --   --   --  7*    < > = values in this interval not displayed.       Previous work up:  See previous notes      Portions of the record may have been created with voice recognition software. Occasional wrong word or \"sound a like\" substitutions may have occurred due to the inherent limitations of voice recognition software. Read the chart carefully and recognize, using context, where substitutions have occurred.If you have any questions, please contact the dictating provider.  "

## 2024-07-23 NOTE — PROGRESS NOTES
[facility]  Progress Note  Name: Anamaria Parnell I  MRN: [id number]  Unit/Bed#: E5 -01 I Date of Admission: 7/19/2024   Date of Service: 7/23/2024 I Hospital Day: 4    Assessment & Plan   * Gangrene (HCC)  Assessment & Plan  78 yo female ex-smoker w/ a hx of obesity, CKD IV, DM II, HLD, HTN, CAD, L MCA and PCA CVA S/P L TCAR 9/7/23 (Sabino), FÉLIX, mesenteric artery stenosis and aortoiliac occlusive disease w/ PAD and chronic R hallux dry gangrene presented to [facility] on 7/16/24 w/ worsening R great toe pain and R hallux gangrene. Pt seen in consult by nephrology who continues to follow pt for RICARDO on CKD. Pt was transferred to [facility] on 7/19 w/ plans for R femoral endarterectomy w/ retrograde stenting and left leg bypass, which may have to be staged surgeries based on her comorbid conditions. Pt seen in consult by podiatry for R hallux dry gangrene. Xray R foot (+) OM. Podiatry plans local wound care w/ re-eval after revascularization.    Vascular surgery consulted for possible revascularization options for known CLTI w/ AIOD, PAD and R hallux dry gangrene. Pt follows w/ vascular surgery for PAD and chronic limb ischemia; last seen in the office on 6/4/24. Imaging shows extensive AIOD and peripheral arterial disease with occlusion of the R iliac system, nearly occlusive R CFA, and occlusion of popliteal artery with recon of the below knee popliteal and tibials. R LISA: 0.11/-/-. Plan for R fem endart w/ retrograde iliac intervention and R femoral to BK pop bypass w/ GSV tomorrow (7/24).    Diagnostics:  -7/22/24 Xray R foot: Cortical destruction along the ventral aspect of the first distal phalanx worrisome for acute osteomyelitis. Large air within the overlying soft tissues.   -7/21/24 Echo: Left ventricular cavity size is normal. Wall thickness is severely increased. The left ventricular ejection fraction is 41% by biplane measurement. Systolic function is mildly reduced. Global  longitudinal strain is reduced at -10% with moderate to severe strain reduction in the ED mid to basal anteroseptal septal and inferior walls.. Unable to grade diastolic dysfunction given the severe degree of mitral annular calcification.   -7/18/24 LE vein mapping: Right: The great saphenous vein is patent  from the groin to the ankle. The intraluminal diameter measurements range from 2.2mm to 6.4mm throughout. Left: The great saphenous vein is patent from the groin to the ankle. The intraluminal diameter measurements range from 1.3mm to 6.8mm throughout.  -7/18/24 CTA abd w/ runoff: Visualized descending thoracic and suprarenal abdominal aorta demonstrate marked soft plaque with multifocal regions of plaque ulceration. A point of relative stenosis within the infrarenal abdominal aorta measures up to 7 mm in diameter. Bilateral multifocal severe stenosis within the external iliac and common femoral arteries. Bilateral long segment SFA occlusion extending from the ostia to the level of the P1 popliteal artery. Three-vessel runoff on the right, the posterior tibial artery is dominant. Two-vessel runoff on the left, the peroneal artery is dominant. Segmental visualization of the posterior tibial artery. Interval increase in size of an exophytic lesion arising medially from the stomach, again consistent with gastrointestinal stromal tumor. New dilation of the pancreatic duct within the pancreatic head, no obvious pancreatic/ampulla lesion identified.  -7/16/24 LEAD: Right: Severe diffuse disease noted throughout the femoral-popliteal arteries with high grade stenosis vs occlusion of the proximal-distal superficial femoral artery with reconstitution to the popliteal artery. Evidence suggestive of TIb/Peroneal occlusive disease. There is a high grade stenosis vs occlusion of the distal anterior tibial artery. R LISA: 0.11/-/-. Left: Severe diffuse disease noted throughout the femoral-popliteal arteries with high grade  "stenosis vs occlusion of the proximal-mid superficial femoral artery with reconstitution in the distal SFA. Evidence suggestive of TIb/Peroneal occlusive disease. There is high grade stenosis vs. occlusion of the entire posterior tibial artery. L LISA: 0.31/20/19.    Plan:   -CLTI w/ advanced calcific atherosclerotic AIOD and PAD w/ R hallux dry gangrene (+) OM and recurrent R foot cellulitis   -BC (-) x5 days  -Continue ABX per primary team  -Xray R foot (+) OM  -Podiatry following w/ plans to continue local wound care; input appreciated  -CTA abd w/ runoff shows occlusion of the R iliac system, nearly occlusive R CFA, occlusion of popliteal artery with recon of the below knee popliteal and tibials   -LEAD shows R LISA: 0.11/-/-  -Plan for R fem endart w/ retrograde R iliac intervention and fem to BK pop bypass in OR; scheduled for Wed (7/24)  -Please keep pt NPO after MN tonight for OR tomorrow  -Cardiology consult appreciated. Per cardiology, \"risk for surgery is high however her risk if nothing is done regarding her foot wound is also very high as this will certainly cause complications down the road if not addressed.\"   -Continue ASA, brilinta and pravastatin for medical management  -Nephrology following for RICARDO on CKD; input appreciated  -Continue medical management per primary team  -Will discuss w/ Dr. Sni      Subjective:  Pt seen for exam while sitting in her chair; NAD. Pt denies any complaints at this time.    Vitals:  /73 (BP Location: Left arm)   Pulse 91   Temp 98.1 °F (36.7 °C) (Oral)   Resp 18   Ht 4' 10\" (1.473 m)   Wt 65.3 kg (144 lb)   LMP  (LMP Unknown)   SpO2 93%   BMI 30.10 kg/m²     I/Os:  I/O last 3 completed shifts:  In: 120 [P.O.:120]  Out: 800 [Urine:800]  I/O this shift:  In: 240 [P.O.:240]  Out: 200 [Urine:200]    Lab Results and Cultures:   Lab Results   Component Value Date    WBC 12.47 (H) 07/23/2024    HGB 9.9 (L) 07/23/2024    HCT 30.3 (L) 07/23/2024    MCV 88 " 07/23/2024     07/23/2024     Lab Results   Component Value Date    GLUCOSE 130 09/07/2023    CALCIUM 8.4 07/23/2024    K 4.1 07/23/2024    CO2 24 07/23/2024     07/23/2024    BUN 22 07/23/2024    CREATININE 1.43 (H) 07/23/2024     Lab Results   Component Value Date    INR 1.17 07/16/2024    INR 0.92 04/18/2024    INR 0.96 09/08/2023    PROTIME 15.6 (H) 07/16/2024    PROTIME 12.9 04/18/2024    PROTIME 13.0 09/08/2023     Blood Culture:   Lab Results   Component Value Date    BLOODCX No Growth After 5 Days. 07/17/2024     Urinalysis:   Lab Results   Component Value Date    COLORU Light Yellow 07/17/2024    CLARITYU Clear 07/17/2024    SPECGRAV 1.016 07/17/2024    PHUR 5.5 07/17/2024    LEUKOCYTESUR Moderate (A) 07/17/2024    NITRITE Negative 07/17/2024    GLUCOSEU Negative 07/17/2024    KETONESU Negative 07/17/2024    BILIRUBINUR Negative 07/17/2024    BLOODU Negative 07/17/2024     Urine Culture:   Lab Results   Component Value Date    URINECX (A) 07/17/2024     >100,000 cfu/ml - Lactobacillus gasseri Lactobacillus species    URINECX <10,000 cfu/ml Micrococcus luteus (A) 07/17/2024    URINECX (A) 07/17/2024     <10,000 cfu/ml - Lactobacillus rhamnosus Lactobacillus species       Medications:  Current Facility-Administered Medications   Medication Dose Route Frequency    acetaminophen (TYLENOL) tablet 975 mg  975 mg Oral Q8H JAVIER    allopurinol (ZYLOPRIM) tablet 100 mg  100 mg Oral Daily    amLODIPine (NORVASC) tablet 5 mg  5 mg Oral Daily    aspirin chewable tablet 81 mg  81 mg Oral Daily    ceFAZolin (ANCEF) IVPB (premix in dextrose) 1,000 mg 50 mL  1,000 mg Intravenous Q12H    cloNIDine (CATAPRES-TTS-3) 0.3 mg/24 hr TD weekly patch  1 patch Transdermal Weekly    escitalopram (LEXAPRO) tablet 20 mg  20 mg Oral Daily    heparin (porcine) subcutaneous injection 5,000 Units  5,000 Units Subcutaneous Q8H JAVIER    HYDROmorphone HCl (DILAUDID) injection 0.2 mg  0.2 mg Intravenous Q6H PRN    insulin lispro  (HumALOG/ADMELOG) 100 units/mL subcutaneous injection 1-5 Units  1-5 Units Subcutaneous TID AC    lidocaine (LIDODERM) 5 % patch 1 patch  1 patch Topical Daily    metoprolol succinate (TOPROL-XL) 24 hr tablet 100 mg  100 mg Oral Daily    ondansetron (ZOFRAN) injection 4 mg  4 mg Intravenous Q6H PRN    oxyCODONE (ROXICODONE) IR tablet 5 mg  5 mg Oral Q6H PRN    pravastatin (PRAVACHOL) tablet 80 mg  80 mg Oral Daily With Dinner    sodium bicarbonate tablet 650 mg  650 mg Oral BID after meals    ticagrelor (BRILINTA) tablet 90 mg  90 mg Oral Q12H JAVIER    traZODone (DESYREL) tablet 50 mg  50 mg Oral HS       Imaging:  See above    Physical Exam:    General appearance: alert and oriented, in no acute distress  Skin: Skin color, texture, turgor normal. No rashes or lesions; R hallux gangrene  Neurologic: Grossly normal  Head: Normocephalic, without obvious abnormality, atraumatic  Lungs: clear to auscultation bilaterally  Heart: regular rate and rhythm, S1, S2 normal, no murmur, click, rub or gallop  Abdomen:  soft, non-tender  Extremities:  extremities normal, warm w/ toes slightly cool on R foot, trace pitting R foot edema, R great toe dry gangrene    Wound/Incision:  R hallux dry gangrene painted w/ betadine, open to air. Mild erythema to surrounding skin.    Pulse exam:  DP: Right:  absent  Left: doppler signal  PT: Right: faint monophasic doppler signal Left: doppler signal      GRACIELA Garcia  7/23/2024

## 2024-07-23 NOTE — PLAN OF CARE
Problem: PAIN - ADULT  Goal: Verbalizes/displays adequate comfort level or baseline comfort level  Description: Interventions:  - Encourage patient to monitor pain and request assistance  - Assess pain using appropriate pain scale  - Administer analgesics based on type and severity of pain and evaluate response  - Implement non-pharmacological measures as appropriate and evaluate response  - Consider cultural and social influences on pain and pain management  - Notify physician/advanced practitioner if interventions unsuccessful or patient reports new pain  Outcome: Progressing     Problem: INFECTION - ADULT  Goal: Absence or prevention of progression during hospitalization  Description: INTERVENTIONS:  - Assess and monitor for signs and symptoms of infection  - Monitor lab/diagnostic results  - Monitor all insertion sites, i.e. indwelling lines, tubes, and drains  - Monitor endotracheal if appropriate and nasal secretions for changes in amount and color  - Victor appropriate cooling/warming therapies per order  - Administer medications as ordered  - Instruct and encourage patient and family to use good hand hygiene technique  - Identify and instruct in appropriate isolation precautions for identified infection/condition  Outcome: Progressing     Problem: SAFETY ADULT  Goal: Patient will remain free of falls  Description: INTERVENTIONS:  - Educate patient/family on patient safety including physical limitations  - Instruct patient to call for assistance with activity   - Consult OT/PT to assist with strengthening/mobility   - Keep Call bell within reach  - Keep bed low and locked with side rails adjusted as appropriate  - Keep care items and personal belongings within reach  - Initiate and maintain comfort rounds  - Make Fall Risk Sign visible to staff  - Offer Toileting every 2 Hours, in advance of need  - Initiate/Maintain bed alarm  - Apply yellow socks and bracelet for high fall risk patients  - Consider  moving patient to room near nurses station  Outcome: Progressing  Goal: Maintain or return to baseline ADL function  Description: INTERVENTIONS:  -  Assess patient's ability to carry out ADLs; assess patient's baseline for ADL function and identify physical deficits which impact ability to perform ADLs (bathing, care of mouth/teeth, toileting, grooming, dressing, etc.)  - Assess/evaluate cause of self-care deficits   - Assess range of motion  - Assess patient's mobility; develop plan if impaired  - Assess patient's need for assistive devices and provide as appropriate  - Encourage maximum independence but intervene and supervise when necessary  - Involve family in performance of ADLs  - Assess for home care needs following discharge   - Consider OT consult to assist with ADL evaluation and planning for discharge  - Provide patient education as appropriate  Outcome: Progressing  Goal: Maintains/Returns to pre admission functional level  Description: INTERVENTIONS:  - Perform AM-PAC 6 Click Basic Mobility/ Daily Activity assessment daily.  - Set and communicate daily mobility goal to care team and patient/family/caregiver.   - Collaborate with rehabilitation services on mobility goals if consulted  - Stand patient 3 times a day  - Ambulate patient 3 times a day  - Out of bed to chair 3 times a day   - Out of bed for meals 3 times a day  - Out of bed for toileting  - Record patient progress and toleration of activity level   Outcome: Progressing     Problem: DISCHARGE PLANNING  Goal: Discharge to home or other facility with appropriate resources  Description: INTERVENTIONS:  - Identify barriers to discharge w/patient and caregiver  - Arrange for needed discharge resources and transportation as appropriate  - Identify discharge learning needs (meds, wound care, etc.)  - Arrange for interpretive services to assist at discharge as needed  - Refer to Case Management Department for coordinating discharge planning if the  patient needs post-hospital services based on physician/advanced practitioner order or complex needs related to functional status, cognitive ability, or social support system  Outcome: Progressing     Problem: Knowledge Deficit  Goal: Patient/family/caregiver demonstrates understanding of disease process, treatment plan, medications, and discharge instructions  Description: Complete learning assessment and assess knowledge base.  Interventions:  - Provide teaching at level of understanding  - Provide teaching via preferred learning methods  Outcome: Progressing     Problem: CARDIOVASCULAR - ADULT  Goal: Maintains optimal cardiac output and hemodynamic stability  Description: INTERVENTIONS:  - Monitor I/O, vital signs and rhythm  - Monitor for S/S and trends of decreased cardiac output  - Administer and titrate ordered vasoactive medications to optimize hemodynamic stability  - Assess quality of pulses, skin color and temperature  - Assess for signs of decreased coronary artery perfusion  - Instruct patient to report change in severity of symptoms  Outcome: Progressing     Problem: METABOLIC, FLUID AND ELECTROLYTES - ADULT  Goal: Electrolytes maintained within normal limits  Description: INTERVENTIONS:  - Monitor labs and assess patient for signs and symptoms of electrolyte imbalances  - Administer electrolyte replacement as ordered  - Monitor response to electrolyte replacements, including repeat lab results as appropriate  - Instruct patient on fluid and nutrition as appropriate  Outcome: Progressing  Goal: Fluid balance maintained  Description: INTERVENTIONS:  - Monitor labs   - Monitor I/O and WT  - Instruct patient on fluid and nutrition as appropriate  - Assess for signs & symptoms of volume excess or deficit  Outcome: Progressing  Goal: Glucose maintained within target range  Description: INTERVENTIONS:  - Monitor Blood Glucose as ordered  - Assess for signs and symptoms of hyperglycemia and hypoglycemia  -  Administer ordered medications to maintain glucose within target range  - Assess nutritional intake and initiate nutrition service referral as needed  Outcome: Progressing     Problem: HEMATOLOGIC - ADULT  Goal: Maintains hematologic stability  Description: INTERVENTIONS  - Assess for signs and symptoms of bleeding or hemorrhage  - Monitor labs  - Administer supportive blood products/factors as ordered and appropriate  Outcome: Progressing     Problem: MUSCULOSKELETAL - ADULT  Goal: Maintain or return mobility to safest level of function  Description: INTERVENTIONS:  - Assess patient's ability to carry out ADLs; assess patient's baseline for ADL function and identify physical deficits which impact ability to perform ADLs (bathing, care of mouth/teeth, toileting, grooming, dressing, etc.)  - Assess/evaluate cause of self-care deficits   - Assess range of motion  - Assess patient's mobility  - Assess patient's need for assistive devices and provide as appropriate  - Encourage maximum independence but intervene and supervise when necessary  - Involve family in performance of ADLs  - Assess for home care needs following discharge   - Consider OT consult to assist with ADL evaluation and planning for discharge  - Provide patient education as appropriate  Outcome: Progressing  Goal: Maintain proper alignment of affected body part  Description: INTERVENTIONS:  - Support, maintain and protect limb and body alignment  - Provide patient/ family with appropriate education  Outcome: Progressing     Problem: Prexisting or High Potential for Compromised Skin Integrity  Goal: Skin integrity is maintained or improved  Description: INTERVENTIONS:  - Identify patients at risk for skin breakdown  - Assess and monitor skin integrity  - Assess and monitor nutrition and hydration status  - Monitor labs   - Assess for incontinence   - Turn and reposition patient  - Assist with mobility/ambulation  - Relieve pressure over bony  prominences  - Avoid friction and shearing  - Provide appropriate hygiene as needed including keeping skin clean and dry  - Evaluate need for skin moisturizer/barrier cream  - Collaborate with interdisciplinary team   - Patient/family teaching  - Consider wound care consult   Outcome: Progressing

## 2024-07-23 NOTE — ASSESSMENT & PLAN NOTE
Patient had mid-sternal chest pain with SOB this afternoon. HR to 120 bpm on Kimo.  EKG no acute ischemia. Doubt angina. Prefer anxiety.  2 L O2 via NC placed for comfort  Small dose anxiolytic per SLIM  CXR ordered. Will consider diuretic based on result.  Encouraged incentive spirometer.

## 2024-07-23 NOTE — ASSESSMENT & PLAN NOTE
Lab Results   Component Value Date    HGBA1C 5.9 (H) 07/09/2024     Recent Labs     07/22/24  1110 07/22/24  1606 07/22/24  2124 07/23/24  0713   POCGLU 121 125 108 126     Prior to admission on Tradjenta.  Currently on sliding scale insulin.

## 2024-07-23 NOTE — ANESTHESIA PREPROCEDURE EVALUATION
Procedure:  R femoral endarterectomy with retrograde iliac intervention and femoral to BK popliteal bypass w/ GSV (Right: Leg Lower)    Relevant Problems   CARDIO   (+) Aortoiliac occlusive disease (HCC)   (+) Basilar artery stenosis   (+) Coronary artery disease of native artery of native heart with stable angina pectoris (HCC)   (+) Hypercholesteremia   (+) Hyperlipidemia associated with type 2 diabetes mellitus  (HCC)   (+) Hyperlipidemia, unspecified   (+) Hypertension   (+) Hypertension associated with diabetes  (HCC)   (+) Primary hypertension   (+) Type 2 diabetes mellitus with diabetic peripheral angiopathy without gangrene (HCC)      ENDO   (+) Secondary hyperparathyroidism of renal origin (HCC)   (+) Type 2 diabetes mellitus with diabetic nephropathy (HCC)   (+) Type 2 diabetes mellitus with diabetic peripheral angiopathy without gangrene (HCC)   (+) Type 2 diabetes mellitus, without long-term current use of insulin (HCC)      GI/HEPATIC   (+) Chronic GERD      /RENAL   (+) Acute renal failure superimposed on stage 3 chronic kidney disease (HCC)   (+) Complex renal cyst   (+) Hypertensive kidney disease with stage 4 chronic kidney disease (HCC)   (+) Renal cyst   (+) Stage 3b chronic kidney disease (HCC)   (+) Stage 4 chronic kidney disease (HCC)      MUSCULOSKELETAL   (+) Gout   (+) Lumbosacral spondylosis without myelopathy   (+) Osteoarthritis   (+) Spondylosis of lumbar region without myelopathy or radiculopathy      NEURO/PSYCH   (+) CVA (cerebral vascular accident) (HCC)   (+) Claudication in peripheral vascular disease (HCC)   (+) Depression, recurrent (HCC)      PULMONARY   (+) Smoking      Orthopedic/Musculoskeletal   (+) Cellulitis of toe of right foot      Other   (+) Aortoiliac stenosis, left (HCC)        Physical Exam    Airway    Mallampati score: II  TM Distance: >3 FB  Neck ROM: full     Dental   Comment: Some missing teeth.  Denies loose teeth.     Cardiovascular  Rhythm: regular, Rate:  normal, Cardiovascular exam normal    Pulmonary  Pulmonary exam normal Breath sounds clear to auscultation    Other Findings  post-pubertal.      Anesthesia Plan  ASA Score- 3     Anesthesia Type- general with ASA Monitors.         Additional Monitors: arterial line.    Airway Plan: ETT.           Plan Factors-    Chart reviewed. EKG reviewed. Imaging results reviewed. Existing labs reviewed. Patient summary reviewed.                  Induction- intravenous.    Postoperative Plan- Plan for postoperative opioid use.         Informed Consent- Anesthetic plan and risks discussed with patient.

## 2024-07-23 NOTE — PLAN OF CARE
Problem: PAIN - ADULT  Goal: Verbalizes/displays adequate comfort level or baseline comfort level  Description: Interventions:  - Encourage patient to monitor pain and request assistance  - Assess pain using appropriate pain scale  - Administer analgesics based on type and severity of pain and evaluate response  - Implement non-pharmacological measures as appropriate and evaluate response  - Consider cultural and social influences on pain and pain management  - Notify physician/advanced practitioner if interventions unsuccessful or patient reports new pain  Outcome: Progressing     Problem: INFECTION - ADULT  Goal: Absence or prevention of progression during hospitalization  Description: INTERVENTIONS:  - Assess and monitor for signs and symptoms of infection  - Monitor lab/diagnostic results  - Monitor all insertion sites, i.e. indwelling lines, tubes, and drains  - Monitor endotracheal if appropriate and nasal secretions for changes in amount and color  - [place] appropriate cooling/warming therapies per order  - Administer medications as ordered  - Instruct and encourage patient and family to use good hand hygiene technique  - Identify and instruct in appropriate isolation precautions for identified infection/condition  Outcome: Progressing     Problem: SAFETY ADULT  Goal: Patient will remain free of falls  Description: INTERVENTIONS:  - Educate patient/family on patient safety including physical limitations  - Instruct patient to call for assistance with activity   - Consult OT/PT to assist with strengthening/mobility   - Keep Call bell within reach  - Keep bed low and locked with side rails adjusted as appropriate  - Keep care items and personal belongings within reach  - Initiate and maintain comfort rounds  - Make Fall Risk Sign visible to staff  - Offer Toileting every 2 Hours, in advance of need  - Initiate/Maintain bed alarm  - Apply yellow socks and bracelet for high fall risk patients  - Consider  moving patient to room near nurses station  Outcome: Progressing  Goal: Maintain or return to baseline ADL function  Description: INTERVENTIONS:  -  Assess patient's ability to carry out ADLs; assess patient's baseline for ADL function and identify physical deficits which impact ability to perform ADLs (bathing, care of mouth/teeth, toileting, grooming, dressing, etc.)  - Assess/evaluate cause of self-care deficits   - Assess range of motion  - Assess patient's mobility; develop plan if impaired  - Assess patient's need for assistive devices and provide as appropriate  - Encourage maximum independence but intervene and supervise when necessary  - Involve family in performance of ADLs  - Assess for home care needs following discharge   - Consider OT consult to assist with ADL evaluation and planning for discharge  - Provide patient education as appropriate  Outcome: Progressing  Goal: Maintains/Returns to pre admission functional level  Description: INTERVENTIONS:  - Perform AM-PAC 6 Click Basic Mobility/ Daily Activity assessment daily.  - Set and communicate daily mobility goal to care team and patient/family/caregiver.   - Collaborate with rehabilitation services on mobility goals if consulted  - Stand patient 3 times a day  - Ambulate patient 3 times a day  - Out of bed to chair 3 times a day   - Out of bed for meals 3 times a day  - Out of bed for toileting  - Record patient progress and toleration of activity level   Outcome: Progressing     Problem: DISCHARGE PLANNING  Goal: Discharge to home or other facility with appropriate resources  Description: INTERVENTIONS:  - Identify barriers to discharge w/patient and caregiver  - Arrange for needed discharge resources and transportation as appropriate  - Identify discharge learning needs (meds, wound care, etc.)  - Arrange for interpretive services to assist at discharge as needed  - Refer to Case Management Department for coordinating discharge planning if the  patient needs post-hospital services based on physician/advanced practitioner order or complex needs related to functional status, cognitive ability, or social support system  Outcome: Progressing     Problem: Knowledge Deficit  Goal: Patient/family/caregiver demonstrates understanding of disease process, treatment plan, medications, and discharge instructions  Description: Complete learning assessment and assess knowledge base.  Interventions:  - Provide teaching at level of understanding  - Provide teaching via preferred learning methods  Outcome: Progressing     Problem: CARDIOVASCULAR - ADULT  Goal: Maintains optimal cardiac output and hemodynamic stability  Description: INTERVENTIONS:  - Monitor I/O, vital signs and rhythm  - Monitor for S/S and trends of decreased cardiac output  - Administer and titrate ordered vasoactive medications to optimize hemodynamic stability  - Assess quality of pulses, skin color and temperature  - Assess for signs of decreased coronary artery perfusion  - Instruct patient to report change in severity of symptoms  Outcome: Progressing     Problem: METABOLIC, FLUID AND ELECTROLYTES - ADULT  Goal: Electrolytes maintained within normal limits  Description: INTERVENTIONS:  - Monitor labs and assess patient for signs and symptoms of electrolyte imbalances  - Administer electrolyte replacement as ordered  - Monitor response to electrolyte replacements, including repeat lab results as appropriate  - Instruct patient on fluid and nutrition as appropriate  Outcome: Progressing  Goal: Fluid balance maintained  Description: INTERVENTIONS:  - Monitor labs   - Monitor I/O and WT  - Instruct patient on fluid and nutrition as appropriate  - Assess for signs & symptoms of volume excess or deficit  Outcome: Progressing  Goal: Glucose maintained within target range  Description: INTERVENTIONS:  - Monitor Blood Glucose as ordered  - Assess for signs and symptoms of hyperglycemia and hypoglycemia  -  Administer ordered medications to maintain glucose within target range  - Assess nutritional intake and initiate nutrition service referral as needed  Outcome: Progressing     Problem: HEMATOLOGIC - ADULT  Goal: Maintains hematologic stability  Description: INTERVENTIONS  - Assess for signs and symptoms of bleeding or hemorrhage  - Monitor labs  - Administer supportive blood products/factors as ordered and appropriate  Outcome: Progressing     Problem: MUSCULOSKELETAL - ADULT  Goal: Maintain or return mobility to safest level of function  Description: INTERVENTIONS:  - Assess patient's ability to carry out ADLs; assess patient's baseline for ADL function and identify physical deficits which impact ability to perform ADLs (bathing, care of mouth/teeth, toileting, grooming, dressing, etc.)  - Assess/evaluate cause of self-care deficits   - Assess range of motion  - Assess patient's mobility  - Assess patient's need for assistive devices and provide as appropriate  - Encourage maximum independence but intervene and supervise when necessary  - Involve family in performance of ADLs  - Assess for home care needs following discharge   - Consider OT consult to assist with ADL evaluation and planning for discharge  - Provide patient education as appropriate  Outcome: Progressing  Goal: Maintain proper alignment of affected body part  Description: INTERVENTIONS:  - Support, maintain and protect limb and body alignment  - Provide patient/ family with appropriate education  Outcome: Progressing     Problem: Prexisting or High Potential for Compromised Skin Integrity  Goal: Skin integrity is maintained or improved  Description: INTERVENTIONS:  - Identify patients at risk for skin breakdown  - Assess and monitor skin integrity  - Assess and monitor nutrition and hydration status  - Monitor labs   - Assess for incontinence   - Turn and reposition patient  - Assist with mobility/ambulation  - Relieve pressure over bony  prominences  - Avoid friction and shearing  - Provide appropriate hygiene as needed including keeping skin clean and dry  - Evaluate need for skin moisturizer/barrier cream  - Collaborate with interdisciplinary team   - Patient/family teaching  - Consider wound care consult   Outcome: Progressing

## 2024-07-24 ENCOUNTER — APPOINTMENT (INPATIENT)
Dept: RADIOLOGY | Facility: HOSPITAL | Age: 79
DRG: 278 | End: 2024-07-24
Payer: COMMERCIAL

## 2024-07-24 ENCOUNTER — ANESTHESIA (INPATIENT)
Dept: PERIOP | Facility: HOSPITAL | Age: 79
DRG: 278 | End: 2024-07-24
Payer: COMMERCIAL

## 2024-07-24 LAB
ABO GROUP BLD BPU: NORMAL
ALBUMIN SERPL BCG-MCNC: 2.7 G/DL (ref 3.5–5)
ALP SERPL-CCNC: 74 U/L (ref 34–104)
ALT SERPL W P-5'-P-CCNC: 3 U/L (ref 7–52)
ANION GAP SERPL CALCULATED.3IONS-SCNC: 10 MMOL/L (ref 4–13)
ANION GAP SERPL CALCULATED.3IONS-SCNC: 12 MMOL/L (ref 4–13)
AST SERPL W P-5'-P-CCNC: 15 U/L (ref 13–39)
BASE EXCESS BLDA CALC-SCNC: -4 MMOL/L (ref -2–3)
BASE EXCESS BLDA CALC-SCNC: -6 MMOL/L (ref -2–3)
BASE EXCESS BLDA CALC-SCNC: -6 MMOL/L (ref -2–3)
BASE EXCESS BLDA CALC-SCNC: -7 MMOL/L (ref -2–3)
BASE EXCESS BLDA CALC-SCNC: -7.8 MMOL/L
BASE EXCESS BLDA CALC-SCNC: -8 MMOL/L (ref -2–3)
BASOPHILS # BLD MANUAL: 0 THOUSAND/UL (ref 0–0.1)
BASOPHILS NFR MAR MANUAL: 0 % (ref 0–1)
BILIRUB SERPL-MCNC: 0.48 MG/DL (ref 0.2–1)
BPU ID: NORMAL
BUN SERPL-MCNC: 24 MG/DL (ref 5–25)
BUN SERPL-MCNC: 25 MG/DL (ref 5–25)
CA-I BLD-SCNC: 0.98 MMOL/L (ref 1.12–1.32)
CA-I BLD-SCNC: 1.02 MMOL/L (ref 1.12–1.32)
CA-I BLD-SCNC: 1.02 MMOL/L (ref 1.12–1.32)
CA-I BLD-SCNC: 1.08 MMOL/L (ref 1.12–1.32)
CA-I BLD-SCNC: 1.43 MMOL/L (ref 1.12–1.32)
CALCIUM ALBUM COR SERPL-MCNC: 9.8 MG/DL (ref 8.3–10.1)
CALCIUM SERPL-MCNC: 8.8 MG/DL (ref 8.4–10.2)
CALCIUM SERPL-MCNC: 8.8 MG/DL (ref 8.4–10.2)
CHLORIDE SERPL-SCNC: 104 MMOL/L (ref 96–108)
CHLORIDE SERPL-SCNC: 114 MMOL/L (ref 96–108)
CO2 SERPL-SCNC: 17 MMOL/L (ref 21–32)
CO2 SERPL-SCNC: 23 MMOL/L (ref 21–32)
CREAT SERPL-MCNC: 1.41 MG/DL (ref 0.6–1.3)
CREAT SERPL-MCNC: 1.46 MG/DL (ref 0.6–1.3)
EOSINOPHIL # BLD MANUAL: 0.32 THOUSAND/UL (ref 0–0.4)
EOSINOPHIL NFR BLD MANUAL: 1 % (ref 0–6)
ERYTHROCYTE [DISTWIDTH] IN BLOOD BY AUTOMATED COUNT: 14.2 % (ref 11.6–15.1)
ERYTHROCYTE [DISTWIDTH] IN BLOOD BY AUTOMATED COUNT: 14.5 % (ref 11.6–15.1)
GFR SERPL CREATININE-BSD FRML MDRD: 34 ML/MIN/1.73SQ M
GFR SERPL CREATININE-BSD FRML MDRD: 35 ML/MIN/1.73SQ M
GLUCOSE SERPL-MCNC: 127 MG/DL (ref 65–140)
GLUCOSE SERPL-MCNC: 138 MG/DL (ref 65–140)
GLUCOSE SERPL-MCNC: 138 MG/DL (ref 65–140)
GLUCOSE SERPL-MCNC: 163 MG/DL (ref 65–140)
GLUCOSE SERPL-MCNC: 179 MG/DL (ref 65–140)
GLUCOSE SERPL-MCNC: 183 MG/DL (ref 65–140)
GLUCOSE SERPL-MCNC: 186 MG/DL (ref 65–140)
GLUCOSE SERPL-MCNC: 189 MG/DL (ref 65–140)
GLUCOSE SERPL-MCNC: 228 MG/DL (ref 65–140)
HCO3 BLDA-SCNC: 17.2 MMOL/L (ref 22–28)
HCO3 BLDA-SCNC: 17.8 MMOL/L (ref 22–28)
HCO3 BLDA-SCNC: 19 MMOL/L (ref 22–28)
HCO3 BLDA-SCNC: 19.2 MMOL/L (ref 22–28)
HCO3 BLDA-SCNC: 19.4 MMOL/L (ref 22–28)
HCO3 BLDA-SCNC: 22 MMOL/L (ref 22–28)
HCT VFR BLD AUTO: 31.3 % (ref 34.8–46.1)
HCT VFR BLD AUTO: 33.1 % (ref 34.8–46.1)
HCT VFR BLD CALC: 21 % (ref 34.8–46.1)
HCT VFR BLD CALC: 22 % (ref 34.8–46.1)
HCT VFR BLD CALC: 23 % (ref 34.8–46.1)
HCT VFR BLD CALC: 23 % (ref 34.8–46.1)
HCT VFR BLD CALC: 24 % (ref 34.8–46.1)
HGB BLD-MCNC: 10.2 G/DL (ref 11.5–15.4)
HGB BLD-MCNC: 11 G/DL (ref 11.5–15.4)
HGB BLDA-MCNC: 7.1 G/DL (ref 11.5–15.4)
HGB BLDA-MCNC: 7.5 G/DL (ref 11.5–15.4)
HGB BLDA-MCNC: 7.8 G/DL (ref 11.5–15.4)
HGB BLDA-MCNC: 7.8 G/DL (ref 11.5–15.4)
HGB BLDA-MCNC: 8.2 G/DL (ref 11.5–15.4)
HOROWITZ INDEX BLDA+IHG-RTO: 100 MM[HG]
INR PPP: 1.51 (ref 0.84–1.19)
KCT BLD-ACNC: 204 SEC (ref 89–137)
KCT BLD-ACNC: 210 SEC (ref 89–137)
KCT BLD-ACNC: 216 SEC (ref 89–137)
KCT BLD-ACNC: 217 SEC (ref 89–137)
KCT BLD-ACNC: 223 SEC (ref 89–137)
KCT BLD-ACNC: 248 SEC (ref 89–137)
LACTATE SERPL-SCNC: 1.5 MMOL/L (ref 0.5–2)
LYMPHOCYTES # BLD AUTO: 1.62 THOUSAND/UL (ref 0.6–4.47)
LYMPHOCYTES # BLD AUTO: 4 % (ref 14–44)
MAGNESIUM SERPL-MCNC: 1.6 MG/DL (ref 1.9–2.7)
MCH RBC QN AUTO: 28.4 PG (ref 26.8–34.3)
MCH RBC QN AUTO: 30.4 PG (ref 26.8–34.3)
MCHC RBC AUTO-ENTMCNC: 32.6 G/DL (ref 31.4–37.4)
MCHC RBC AUTO-ENTMCNC: 33.2 G/DL (ref 31.4–37.4)
MCV RBC AUTO: 87 FL (ref 82–98)
MCV RBC AUTO: 91 FL (ref 82–98)
METAMYELOCYTE ABSOLUTE CT: 0.32 THOUSAND/UL (ref 0–0.1)
METAMYELOCYTES NFR BLD MANUAL: 1 % (ref 0–1)
MONOCYTES # BLD AUTO: 0.97 THOUSAND/UL (ref 0–1.22)
MONOCYTES NFR BLD: 3 % (ref 4–12)
NEUTROPHILS # BLD MANUAL: 29.12 THOUSAND/UL (ref 1.85–7.62)
NEUTS BAND NFR BLD MANUAL: 2 % (ref 0–8)
NEUTS SEG NFR BLD AUTO: 88 % (ref 43–75)
O2 CT BLDA-SCNC: 16.8 ML/DL (ref 16–23)
OXYHGB MFR BLDA: 98.7 % (ref 94–97)
PCO2 BLD: 18 MMOL/L (ref 21–32)
PCO2 BLD: 20 MMOL/L (ref 21–32)
PCO2 BLD: 20 MMOL/L (ref 21–32)
PCO2 BLD: 21 MMOL/L (ref 21–32)
PCO2 BLD: 23 MMOL/L (ref 21–32)
PCO2 BLD: 33.2 MM HG (ref 36–44)
PCO2 BLD: 38.4 MM HG (ref 36–44)
PCO2 BLD: 39.3 MM HG (ref 36–44)
PCO2 BLD: 39.5 MM HG (ref 36–44)
PCO2 BLD: 41.2 MM HG (ref 36–44)
PCO2 BLDA: 36.5 MM HG (ref 36–44)
PEEP RESPIRATORY: 6 CM[H2O]
PH BLD: 7.29 [PH] (ref 7.35–7.45)
PH BLD: 7.3 [PH] (ref 7.35–7.45)
PH BLD: 7.31 [PH] (ref 7.35–7.45)
PH BLD: 7.32 [PH] (ref 7.35–7.45)
PH BLD: 7.34 [PH] (ref 7.35–7.45)
PH BLDA: 7.3 [PH] (ref 7.35–7.45)
PHOSPHATE SERPL-MCNC: 6.4 MG/DL (ref 2.3–4.1)
PLATELET # BLD AUTO: 293 THOUSANDS/UL (ref 149–390)
PLATELET # BLD AUTO: 357 THOUSANDS/UL (ref 149–390)
PLATELET BLD QL SMEAR: ADEQUATE
PMV BLD AUTO: 10.2 FL (ref 8.9–12.7)
PMV BLD AUTO: 10.3 FL (ref 8.9–12.7)
PO2 BLD: 102 MM HG (ref 75–129)
PO2 BLD: 123 MM HG (ref 75–129)
PO2 BLD: 147 MM HG (ref 75–129)
PO2 BLD: 87 MM HG (ref 75–129)
PO2 BLD: 92 MM HG (ref 75–129)
PO2 BLDA: 273.7 MM HG (ref 75–129)
POTASSIUM BLD-SCNC: 4 MMOL/L (ref 3.5–5.3)
POTASSIUM BLD-SCNC: 4.2 MMOL/L (ref 3.5–5.3)
POTASSIUM BLD-SCNC: 4.2 MMOL/L (ref 3.5–5.3)
POTASSIUM BLD-SCNC: 4.3 MMOL/L (ref 3.5–5.3)
POTASSIUM BLD-SCNC: 4.6 MMOL/L (ref 3.5–5.3)
POTASSIUM SERPL-SCNC: 3.6 MMOL/L (ref 3.5–5.3)
POTASSIUM SERPL-SCNC: 5.3 MMOL/L (ref 3.5–5.3)
PROT SERPL-MCNC: 5.6 G/DL (ref 6.4–8.4)
PROTHROMBIN TIME: 18.4 SECONDS (ref 11.6–14.5)
RBC # BLD AUTO: 3.59 MILLION/UL (ref 3.81–5.12)
RBC # BLD AUTO: 3.62 MILLION/UL (ref 3.81–5.12)
RBC MORPH BLD: NORMAL
SAO2 % BLD FROM PO2: 96 % (ref 60–85)
SAO2 % BLD FROM PO2: 96 % (ref 60–85)
SAO2 % BLD FROM PO2: 97 % (ref 60–85)
SAO2 % BLD FROM PO2: 99 % (ref 60–85)
SAO2 % BLD FROM PO2: 99 % (ref 60–85)
SODIUM BLD-SCNC: 140 MMOL/L (ref 136–145)
SODIUM BLD-SCNC: 140 MMOL/L (ref 136–145)
SODIUM BLD-SCNC: 141 MMOL/L (ref 136–145)
SODIUM BLD-SCNC: 141 MMOL/L (ref 136–145)
SODIUM BLD-SCNC: 142 MMOL/L (ref 136–145)
SODIUM SERPL-SCNC: 139 MMOL/L (ref 135–147)
SODIUM SERPL-SCNC: 141 MMOL/L (ref 135–147)
SPECIMEN SOURCE: ABNORMAL
UNIT DISPENSE STATUS: NORMAL
UNIT PRODUCT CODE: NORMAL
UNIT PRODUCT VOLUME: 182 ML
UNIT PRODUCT VOLUME: 182 ML
UNIT PRODUCT VOLUME: 289 ML
UNIT PRODUCT VOLUME: 289 ML
UNIT RH: NORMAL
VARIANT LYMPHS # BLD AUTO: 1 %
VENT AC: 12
VENT- AC: AC
VT SETTING VENT: 320 ML
WBC # BLD AUTO: 13.51 THOUSAND/UL (ref 4.31–10.16)
WBC # BLD AUTO: 32.36 THOUSAND/UL (ref 4.31–10.16)

## 2024-07-24 PROCEDURE — 94760 N-INVAS EAR/PLS OXIMETRY 1: CPT

## 2024-07-24 PROCEDURE — 82948 REAGENT STRIP/BLOOD GLUCOSE: CPT

## 2024-07-24 PROCEDURE — 82805 BLOOD GASES W/O2 SATURATION: CPT

## 2024-07-24 PROCEDURE — C1769 GUIDE WIRE: HCPCS | Performed by: SURGERY

## 2024-07-24 PROCEDURE — 99223 1ST HOSP IP/OBS HIGH 75: CPT | Performed by: PHYSICIAN ASSISTANT

## 2024-07-24 PROCEDURE — 82947 ASSAY GLUCOSE BLOOD QUANT: CPT

## 2024-07-24 PROCEDURE — X27J385 DILATION OF LEFT FEMORAL ARTERY WITH SUSTAINED RELEASE DRUG-ELUTING INTRALUMINAL DEVICE, PERCUTANEOUS APPROACH, NEW TECHNOLOGY GROUP 5: ICD-10-PCS | Performed by: SURGERY

## 2024-07-24 PROCEDURE — 82330 ASSAY OF CALCIUM: CPT

## 2024-07-24 PROCEDURE — C1887 CATHETER, GUIDING: HCPCS | Performed by: SURGERY

## 2024-07-24 PROCEDURE — 80053 COMPREHEN METABOLIC PANEL: CPT

## 2024-07-24 PROCEDURE — 84295 ASSAY OF SERUM SODIUM: CPT

## 2024-07-24 PROCEDURE — 85007 BL SMEAR W/DIFF WBC COUNT: CPT

## 2024-07-24 PROCEDURE — 77001 FLUOROGUIDE FOR VEIN DEVICE: CPT

## 2024-07-24 PROCEDURE — 82803 BLOOD GASES ANY COMBINATION: CPT

## 2024-07-24 PROCEDURE — RECHECK: Performed by: INTERNAL MEDICINE

## 2024-07-24 PROCEDURE — 85610 PROTHROMBIN TIME: CPT | Performed by: NURSE ANESTHETIST, CERTIFIED REGISTERED

## 2024-07-24 PROCEDURE — 04FJ3ZZ FRAGMENTATION OF LEFT EXTERNAL ILIAC ARTERY, PERCUTANEOUS APPROACH: ICD-10-PCS | Performed by: SURGERY

## 2024-07-24 PROCEDURE — C1894 INTRO/SHEATH, NON-LASER: HCPCS | Performed by: SURGERY

## 2024-07-24 PROCEDURE — C1874 STENT, COATED/COV W/DEL SYS: HCPCS | Performed by: SURGERY

## 2024-07-24 PROCEDURE — 047D34Z DILATION OF LEFT COMMON ILIAC ARTERY WITH DRUG-ELUTING INTRALUMINAL DEVICE, PERCUTANEOUS APPROACH: ICD-10-PCS | Performed by: SURGERY

## 2024-07-24 PROCEDURE — 71045 X-RAY EXAM CHEST 1 VIEW: CPT

## 2024-07-24 PROCEDURE — B41D1ZZ FLUOROSCOPY OF AORTA AND BILATERAL LOWER EXTREMITY ARTERIES USING LOW OSMOLAR CONTRAST: ICD-10-PCS | Performed by: SURGERY

## 2024-07-24 PROCEDURE — C1768 GRAFT, VASCULAR: HCPCS | Performed by: SURGERY

## 2024-07-24 PROCEDURE — C1725 CATH, TRANSLUMIN NON-LASER: HCPCS | Performed by: SURGERY

## 2024-07-24 PROCEDURE — 37221 PR REVSC OPN/PRQ ILIAC ART W/STNT PLMT & ANGIOPLSTY: CPT | Performed by: SURGERY

## 2024-07-24 PROCEDURE — 84100 ASSAY OF PHOSPHORUS: CPT

## 2024-07-24 PROCEDURE — 04UK0KZ SUPPLEMENT RIGHT FEMORAL ARTERY WITH NONAUTOLOGOUS TISSUE SUBSTITUTE, OPEN APPROACH: ICD-10-PCS | Performed by: SURGERY

## 2024-07-24 PROCEDURE — C1725 CATH, TRANSLUMIN NON-LASER: HCPCS

## 2024-07-24 PROCEDURE — 83605 ASSAY OF LACTIC ACID: CPT

## 2024-07-24 PROCEDURE — 35661 BPG FEMORAL-FEMORAL: CPT | Performed by: SURGERY

## 2024-07-24 PROCEDURE — 04CL0ZZ EXTIRPATION OF MATTER FROM LEFT FEMORAL ARTERY, OPEN APPROACH: ICD-10-PCS | Performed by: SURGERY

## 2024-07-24 PROCEDURE — 80048 BASIC METABOLIC PNL TOTAL CA: CPT | Performed by: INTERNAL MEDICINE

## 2024-07-24 PROCEDURE — 84132 ASSAY OF SERUM POTASSIUM: CPT

## 2024-07-24 PROCEDURE — 94002 VENT MGMT INPAT INIT DAY: CPT

## 2024-07-24 PROCEDURE — 04UL0KZ SUPPLEMENT LEFT FEMORAL ARTERY WITH NONAUTOLOGOUS TISSUE SUBSTITUTE, OPEN APPROACH: ICD-10-PCS | Performed by: SURGERY

## 2024-07-24 PROCEDURE — 85014 HEMATOCRIT: CPT

## 2024-07-24 PROCEDURE — 04CK0ZZ EXTIRPATION OF MATTER FROM RIGHT FEMORAL ARTERY, OPEN APPROACH: ICD-10-PCS | Performed by: SURGERY

## 2024-07-24 PROCEDURE — 85347 COAGULATION TIME ACTIVATED: CPT

## 2024-07-24 PROCEDURE — P9017 PLASMA 1 DONOR FRZ W/IN 8 HR: HCPCS

## 2024-07-24 PROCEDURE — 85027 COMPLETE CBC AUTOMATED: CPT | Performed by: INTERNAL MEDICINE

## 2024-07-24 PROCEDURE — 97605 NEG PRS WND THER DME<=50SQCM: CPT | Performed by: SURGERY

## 2024-07-24 PROCEDURE — 83735 ASSAY OF MAGNESIUM: CPT

## 2024-07-24 PROCEDURE — NC001 PR NO CHARGE: Performed by: SURGERY

## 2024-07-24 PROCEDURE — 85027 COMPLETE CBC AUTOMATED: CPT

## 2024-07-24 PROCEDURE — 37223 PR REVSC OPN/PRQ ILIAC ART W/STNT & ANGIOP IPSILATL: CPT | Performed by: SURGERY

## 2024-07-24 PROCEDURE — P9016 RBC LEUKOCYTES REDUCED: HCPCS

## 2024-07-24 PROCEDURE — 041L0JH BYPASS LEFT FEMORAL ARTERY TO RIGHT FEMORAL ARTERY WITH SYNTHETIC SUBSTITUTE, OPEN APPROACH: ICD-10-PCS | Performed by: SURGERY

## 2024-07-24 DEVICE — XENOSURE BIOLOGIC PATCH, 0.8CM X 8CM, EIFU
Type: IMPLANTABLE DEVICE | Site: GROIN | Status: FUNCTIONAL
Brand: XENOSURE BIOLOGIC PATCH

## 2024-07-24 DEVICE — DRUG-ELUTING VASCULAR STENT SYSTEM
Type: IMPLANTABLE DEVICE | Site: AORTA | Status: FUNCTIONAL
Brand: ELUVIA™

## 2024-07-24 DEVICE — BX2 HEP REDUCED PROFILE BX2 8MMX39MM 7FR 135CM CATH
Type: IMPLANTABLE DEVICE | Site: AORTA | Status: FUNCTIONAL
Brand: GORE VIABAHN VBX BALLOON EXPANDABLE ENDO

## 2024-07-24 DEVICE — PROPATEN VASCULAR GRAFT TW RR 8MMX80CM 70CM RINGS HEPARIN
Type: IMPLANTABLE DEVICE | Site: ABDOMEN | Status: FUNCTIONAL
Brand: GORE PROPATEN VASCULAR GRAFT

## 2024-07-24 RX ORDER — ONDANSETRON 2 MG/ML
4 INJECTION INTRAMUSCULAR; INTRAVENOUS ONCE AS NEEDED
Status: DISCONTINUED | OUTPATIENT
Start: 2024-07-24 | End: 2024-07-25 | Stop reason: SDUPTHER

## 2024-07-24 RX ORDER — LIDOCAINE HYDROCHLORIDE 20 MG/ML
INJECTION, SOLUTION EPIDURAL; INFILTRATION; INTRACAUDAL; PERINEURAL AS NEEDED
Status: DISCONTINUED | OUTPATIENT
Start: 2024-07-24 | End: 2024-07-24

## 2024-07-24 RX ORDER — HEPARIN SODIUM 200 [USP'U]/100ML
INJECTION, SOLUTION INTRAVENOUS
Status: COMPLETED | OUTPATIENT
Start: 2024-07-24 | End: 2024-07-24

## 2024-07-24 RX ORDER — FENTANYL CITRATE-0.9 % NACL/PF 10 MCG/ML
25 PLASTIC BAG, INJECTION (ML) INTRAVENOUS CONTINUOUS
Status: DISCONTINUED | OUTPATIENT
Start: 2024-07-24 | End: 2024-07-25

## 2024-07-24 RX ORDER — SODIUM CHLORIDE, SODIUM LACTATE, POTASSIUM CHLORIDE, CALCIUM CHLORIDE 600; 310; 30; 20 MG/100ML; MG/100ML; MG/100ML; MG/100ML
150 INJECTION, SOLUTION INTRAVENOUS CONTINUOUS
Status: DISCONTINUED | OUTPATIENT
Start: 2024-07-24 | End: 2024-07-25

## 2024-07-24 RX ORDER — MAGNESIUM HYDROXIDE 1200 MG/15ML
LIQUID ORAL AS NEEDED
Status: DISCONTINUED | OUTPATIENT
Start: 2024-07-24 | End: 2024-07-24 | Stop reason: HOSPADM

## 2024-07-24 RX ORDER — CALCIUM CHLORIDE 100 MG/ML
INJECTION INTRAVENOUS; INTRAVENTRICULAR AS NEEDED
Status: DISCONTINUED | OUTPATIENT
Start: 2024-07-24 | End: 2024-07-24

## 2024-07-24 RX ORDER — EPHEDRINE SULFATE 50 MG/ML
INJECTION INTRAVENOUS AS NEEDED
Status: DISCONTINUED | OUTPATIENT
Start: 2024-07-24 | End: 2024-07-24

## 2024-07-24 RX ORDER — FENTANYL CITRATE 50 UG/ML
INJECTION, SOLUTION INTRAMUSCULAR; INTRAVENOUS AS NEEDED
Status: DISCONTINUED | OUTPATIENT
Start: 2024-07-24 | End: 2024-07-24

## 2024-07-24 RX ORDER — FENTANYL CITRATE/PF 50 MCG/ML
50 SYRINGE (ML) INJECTION
Status: DISCONTINUED | OUTPATIENT
Start: 2024-07-24 | End: 2024-07-25

## 2024-07-24 RX ORDER — SODIUM CHLORIDE, SODIUM LACTATE, POTASSIUM CHLORIDE, CALCIUM CHLORIDE 600; 310; 30; 20 MG/100ML; MG/100ML; MG/100ML; MG/100ML
INJECTION, SOLUTION INTRAVENOUS CONTINUOUS PRN
Status: DISCONTINUED | OUTPATIENT
Start: 2024-07-24 | End: 2024-07-24

## 2024-07-24 RX ORDER — PROPOFOL 10 MG/ML
INJECTION, EMULSION INTRAVENOUS AS NEEDED
Status: DISCONTINUED | OUTPATIENT
Start: 2024-07-24 | End: 2024-07-24

## 2024-07-24 RX ORDER — HEPARIN SODIUM 1000 [USP'U]/ML
INJECTION, SOLUTION INTRAVENOUS; SUBCUTANEOUS AS NEEDED
Status: DISCONTINUED | OUTPATIENT
Start: 2024-07-24 | End: 2024-07-24

## 2024-07-24 RX ORDER — HYDROMORPHONE HCL/PF 1 MG/ML
0.5 SYRINGE (ML) INJECTION
Status: DISCONTINUED | OUTPATIENT
Start: 2024-07-24 | End: 2024-07-29 | Stop reason: HOSPADM

## 2024-07-24 RX ORDER — CHLORHEXIDINE GLUCONATE ORAL RINSE 1.2 MG/ML
15 SOLUTION DENTAL EVERY 12 HOURS SCHEDULED
Status: DISCONTINUED | OUTPATIENT
Start: 2024-07-24 | End: 2024-07-29

## 2024-07-24 RX ORDER — CEFAZOLIN SODIUM 1 G/3ML
INJECTION, POWDER, FOR SOLUTION INTRAMUSCULAR; INTRAVENOUS AS NEEDED
Status: DISCONTINUED | OUTPATIENT
Start: 2024-07-24 | End: 2024-07-24

## 2024-07-24 RX ORDER — PROPOFOL 10 MG/ML
5-50 INJECTION, EMULSION INTRAVENOUS
Status: DISCONTINUED | OUTPATIENT
Start: 2024-07-24 | End: 2024-07-25

## 2024-07-24 RX ORDER — SODIUM CHLORIDE 9 MG/ML
INJECTION, SOLUTION INTRAVENOUS CONTINUOUS PRN
Status: DISCONTINUED | OUTPATIENT
Start: 2024-07-24 | End: 2024-07-24

## 2024-07-24 RX ORDER — MAGNESIUM SULFATE HEPTAHYDRATE 40 MG/ML
2 INJECTION, SOLUTION INTRAVENOUS ONCE
Status: COMPLETED | OUTPATIENT
Start: 2024-07-24 | End: 2024-07-25

## 2024-07-24 RX ORDER — HYDROMORPHONE HCL/PF 1 MG/ML
SYRINGE (ML) INJECTION AS NEEDED
Status: DISCONTINUED | OUTPATIENT
Start: 2024-07-24 | End: 2024-07-24

## 2024-07-24 RX ORDER — IODIXANOL 320 MG/ML
INJECTION, SOLUTION INTRAVASCULAR AS NEEDED
Status: DISCONTINUED | OUTPATIENT
Start: 2024-07-24 | End: 2024-07-24 | Stop reason: HOSPADM

## 2024-07-24 RX ORDER — DEXAMETHASONE SODIUM PHOSPHATE 10 MG/ML
INJECTION, SOLUTION INTRAMUSCULAR; INTRAVENOUS AS NEEDED
Status: DISCONTINUED | OUTPATIENT
Start: 2024-07-24 | End: 2024-07-24

## 2024-07-24 RX ORDER — METOPROLOL TARTRATE 1 MG/ML
INJECTION, SOLUTION INTRAVENOUS AS NEEDED
Status: DISCONTINUED | OUTPATIENT
Start: 2024-07-24 | End: 2024-07-24

## 2024-07-24 RX ORDER — ROCURONIUM BROMIDE 10 MG/ML
INJECTION, SOLUTION INTRAVENOUS AS NEEDED
Status: DISCONTINUED | OUTPATIENT
Start: 2024-07-24 | End: 2024-07-24

## 2024-07-24 RX ORDER — HEPARIN SODIUM 5000 [USP'U]/ML
5000 INJECTION, SOLUTION INTRAVENOUS; SUBCUTANEOUS EVERY 8 HOURS SCHEDULED
Status: DISCONTINUED | OUTPATIENT
Start: 2024-07-24 | End: 2024-07-30

## 2024-07-24 RX ORDER — PROTAMINE SULFATE 10 MG/ML
INJECTION, SOLUTION INTRAVENOUS AS NEEDED
Status: DISCONTINUED | OUTPATIENT
Start: 2024-07-24 | End: 2024-07-24

## 2024-07-24 RX ADMIN — TICAGRELOR 90 MG: 90 TABLET ORAL at 21:28

## 2024-07-24 RX ADMIN — LIDOCAINE HYDROCHLORIDE 60 MG: 20 INJECTION, SOLUTION EPIDURAL; INFILTRATION; INTRACAUDAL; PERINEURAL at 08:24

## 2024-07-24 RX ADMIN — CALCIUM CHLORIDE 0.5 G: 100 INJECTION INTRAVENOUS; INTRAVENTRICULAR at 15:59

## 2024-07-24 RX ADMIN — SODIUM CHLORIDE: 9 INJECTION, SOLUTION INTRAVENOUS at 14:47

## 2024-07-24 RX ADMIN — FENTANYL CITRATE 25 MCG: 50 INJECTION, SOLUTION INTRAMUSCULAR; INTRAVENOUS at 14:42

## 2024-07-24 RX ADMIN — HEPARIN SODIUM 5000 UNITS: 1000 INJECTION, SOLUTION INTRAVENOUS; SUBCUTANEOUS at 11:19

## 2024-07-24 RX ADMIN — FENTANYL CITRATE 50 MCG: 50 INJECTION, SOLUTION INTRAMUSCULAR; INTRAVENOUS at 09:56

## 2024-07-24 RX ADMIN — PROTAMINE SULFATE 50 MG: 10 INJECTION, SOLUTION INTRAVENOUS at 17:33

## 2024-07-24 RX ADMIN — HEPARIN SODIUM 5000 UNITS: 5000 INJECTION INTRAVENOUS; SUBCUTANEOUS at 21:29

## 2024-07-24 RX ADMIN — SODIUM CHLORIDE, SODIUM LACTATE, POTASSIUM CHLORIDE, AND CALCIUM CHLORIDE 75 ML/HR: .6; .31; .03; .02 INJECTION, SOLUTION INTRAVENOUS at 19:07

## 2024-07-24 RX ADMIN — HEPARIN SODIUM 2000 UNITS: 1000 INJECTION, SOLUTION INTRAVENOUS; SUBCUTANEOUS at 16:42

## 2024-07-24 RX ADMIN — Medication 0.5 MG: at 15:13

## 2024-07-24 RX ADMIN — ROCURONIUM BROMIDE 20 MG: 10 INJECTION, SOLUTION INTRAVENOUS at 16:07

## 2024-07-24 RX ADMIN — SODIUM CHLORIDE, SODIUM LACTATE, POTASSIUM CHLORIDE, CALCIUM CHLORIDE: 600; 310; 30; 20 INJECTION, SOLUTION INTRAVENOUS at 08:09

## 2024-07-24 RX ADMIN — FENTANYL CITRATE 50 MCG: 50 INJECTION, SOLUTION INTRAMUSCULAR; INTRAVENOUS at 14:30

## 2024-07-24 RX ADMIN — CALCIUM CHLORIDE 0.5 G: 100 INJECTION INTRAVENOUS; INTRAVENTRICULAR at 17:13

## 2024-07-24 RX ADMIN — Medication 25 MEQ: at 18:17

## 2024-07-24 RX ADMIN — SODIUM CHLORIDE, SODIUM LACTATE, POTASSIUM CHLORIDE, CALCIUM CHLORIDE: 600; 310; 30; 20 INJECTION, SOLUTION INTRAVENOUS at 14:29

## 2024-07-24 RX ADMIN — METOPROLOL TARTRATE 2.5 MG: 1 INJECTION, SOLUTION INTRAVENOUS at 11:14

## 2024-07-24 RX ADMIN — ACETAMINOPHEN 975 MG: 325 TABLET, FILM COATED ORAL at 05:00

## 2024-07-24 RX ADMIN — CEFAZOLIN SODIUM 1000 MG: 1 INJECTION, POWDER, FOR SOLUTION INTRAMUSCULAR; INTRAVENOUS at 12:52

## 2024-07-24 RX ADMIN — EPHEDRINE SULFATE 10 MG: 50 INJECTION INTRAVENOUS at 08:40

## 2024-07-24 RX ADMIN — ROCURONIUM BROMIDE 20 MG: 10 INJECTION, SOLUTION INTRAVENOUS at 14:15

## 2024-07-24 RX ADMIN — HEPARIN SODIUM 2000 UNITS: 1000 INJECTION, SOLUTION INTRAVENOUS; SUBCUTANEOUS at 13:36

## 2024-07-24 RX ADMIN — ROCURONIUM BROMIDE 30 MG: 10 INJECTION, SOLUTION INTRAVENOUS at 13:05

## 2024-07-24 RX ADMIN — SODIUM CHLORIDE: 9 INJECTION, SOLUTION INTRAVENOUS at 12:44

## 2024-07-24 RX ADMIN — ROCURONIUM BROMIDE 50 MG: 10 INJECTION, SOLUTION INTRAVENOUS at 08:24

## 2024-07-24 RX ADMIN — Medication 0.5 MG: at 10:41

## 2024-07-24 RX ADMIN — CHLORHEXIDINE GLUCONATE 15 ML: 1.2 RINSE ORAL at 05:01

## 2024-07-24 RX ADMIN — FENTANYL CITRATE 50 MCG: 50 INJECTION, SOLUTION INTRAMUSCULAR; INTRAVENOUS at 17:49

## 2024-07-24 RX ADMIN — FENTANYL CITRATE 50 MCG: 50 INJECTION, SOLUTION INTRAMUSCULAR; INTRAVENOUS at 09:05

## 2024-07-24 RX ADMIN — OXYCODONE HYDROCHLORIDE 5 MG: 5 TABLET ORAL at 05:03

## 2024-07-24 RX ADMIN — MAGNESIUM SULFATE HEPTAHYDRATE 2 G: 40 INJECTION, SOLUTION INTRAVENOUS at 22:08

## 2024-07-24 RX ADMIN — FENTANYL CITRATE 25 MCG: 50 INJECTION, SOLUTION INTRAMUSCULAR; INTRAVENOUS at 13:56

## 2024-07-24 RX ADMIN — ACETAMINOPHEN 975 MG: 325 TABLET, FILM COATED ORAL at 21:29

## 2024-07-24 RX ADMIN — FENTANYL CITRATE 50 MCG: 50 INJECTION, SOLUTION INTRAMUSCULAR; INTRAVENOUS at 08:24

## 2024-07-24 RX ADMIN — CEFAZOLIN SODIUM 1000 MG: 1 INJECTION, POWDER, FOR SOLUTION INTRAMUSCULAR; INTRAVENOUS at 16:44

## 2024-07-24 RX ADMIN — Medication 25 MCG/HR: at 19:11

## 2024-07-24 RX ADMIN — FENTANYL CITRATE 50 MCG: 50 INJECTION, SOLUTION INTRAMUSCULAR; INTRAVENOUS at 16:32

## 2024-07-24 RX ADMIN — CEFAZOLIN SODIUM 1000 MG: 1 SOLUTION INTRAVENOUS at 21:30

## 2024-07-24 RX ADMIN — ROCURONIUM BROMIDE 20 MG: 10 INJECTION, SOLUTION INTRAVENOUS at 10:43

## 2024-07-24 RX ADMIN — CEFAZOLIN SODIUM 1000 MG: 1 INJECTION, POWDER, FOR SOLUTION INTRAMUSCULAR; INTRAVENOUS at 09:41

## 2024-07-24 RX ADMIN — Medication 25 MEQ: at 14:11

## 2024-07-24 RX ADMIN — SODIUM CHLORIDE: 9 INJECTION, SOLUTION INTRAVENOUS at 08:35

## 2024-07-24 RX ADMIN — CHLORHEXIDINE GLUCONATE 15 ML: 1.2 RINSE ORAL at 21:29

## 2024-07-24 RX ADMIN — HEPARIN SODIUM 3000 UNITS: 1000 INJECTION, SOLUTION INTRAVENOUS; SUBCUTANEOUS at 14:45

## 2024-07-24 RX ADMIN — CALCIUM CHLORIDE 0.5 G: 100 INJECTION INTRAVENOUS; INTRAVENTRICULAR at 18:04

## 2024-07-24 RX ADMIN — DEXAMETHASONE SODIUM PHOSPHATE 10 MG: 10 INJECTION, SOLUTION INTRAMUSCULAR; INTRAVENOUS at 08:35

## 2024-07-24 RX ADMIN — PROPOFOL 120 MG: 10 INJECTION, EMULSION INTRAVENOUS at 08:24

## 2024-07-24 RX ADMIN — HEPARIN SODIUM 3000 UNITS: 1000 INJECTION, SOLUTION INTRAVENOUS; SUBCUTANEOUS at 12:28

## 2024-07-24 RX ADMIN — CALCIUM CHLORIDE 0.5 G: 100 INJECTION INTRAVENOUS; INTRAVENTRICULAR at 14:40

## 2024-07-24 RX ADMIN — PROPOFOL 10 MCG/KG/MIN: 10 INJECTION, EMULSION INTRAVENOUS at 19:14

## 2024-07-24 RX ADMIN — FENTANYL CITRATE 50 MCG: 50 INJECTION, SOLUTION INTRAMUSCULAR; INTRAVENOUS at 10:07

## 2024-07-24 RX ADMIN — SODIUM CHLORIDE: 9 INJECTION, SOLUTION INTRAVENOUS at 11:13

## 2024-07-24 NOTE — ANESTHESIA POSTPROCEDURE EVALUATION
"Post-Op Assessment Note    CV Status:  Stable    Pain management: adequate       Post-procedure mental status: sedated.   Hydration Status:  Stable   PONV Controlled:  None   Airway Patency:  Adequate (intubated and sedated)  Airway: intubated     Post Op Vitals Reviewed: Yes    No anethesia notable event occurred.    Staff: Anesthesiologist             BP (!) 218/108 (BP Location: Left arm)   Pulse 97   Temp 97.7 °F (36.5 °C) (Axillary)   Resp 21   Ht 4' 10\" (1.473 m)   Wt 65.2 kg (143 lb 11.8 oz)   LMP  (LMP Unknown)   SpO2 100%   BMI 30.04 kg/m²     BP      Temp      Pulse     Resp      SpO2        "

## 2024-07-24 NOTE — PROGRESS NOTES
Treatment plan    Unable to evaluate patient today.  Patient in OR for vascular surgery with plan for ICU stay afterwards.    Akira Flores,  FACP

## 2024-07-24 NOTE — OCCUPATIONAL THERAPY NOTE
Occupational Therapy         Patient Name: Anamaria Parnell  Today's Date: 7/24/2024    Attempted to see pt for tx session, but pt off the flr for sx--LE bypass. Will continue when appropriate. Job Alba

## 2024-07-24 NOTE — PROGRESS NOTES
Vascular Attending:    Called son Austin with intraoperative update.  Extensive bilateral iliac disease. Unable to cross the heavily calcified right iliac artery.  Able to access left and get across the heavily diseased left iliac artery.  I explained the need to modify plan to left iliac endovascular  intervention, bilateral femoral endarterectomies with with left to right femoral femoral bypass. Based on the length of procedure to complete above,hemodynamic stability etc. may need to defer right femoral to pop bypass to later date. The alternative would be to stop at this time understanding that her perfusion would be essentially unchanged. He expressed that she understood risks and would want be to proceed with the above surgical modification.  I had one of my intraoperative nurses also confirm Austin' acknowledgement and wishes to proceed.

## 2024-07-24 NOTE — ANESTHESIA PROCEDURE NOTES
"Arterial Line Insertion    Performed by: Dennys De La Cruz MD  Authorized by: Jj Nina DO  Consent: Verbal consent obtained. Written consent obtained.  Risks and benefits: risks, benefits and alternatives were discussed  Consent given by: patient  Patient understanding: patient states understanding of the procedure being performed  Patient consent: the patient's understanding of the procedure matches consent given  Procedure consent: procedure consent matches procedure scheduled  Relevant documents: relevant documents present and verified  Test results: test results available and properly labeled  Site marked: the operative site was marked  Radiology Images: Radiology Images displayed and confirmed. If images not available, report reviewed  Required items: required blood products, implants, devices, and special equipment available  Patient identity confirmed: arm band  Time out: Immediately prior to procedure a \"time out\" was called to verify the correct patient, procedure, equipment, support staff and site/side marked as required.  Preparation: Patient was prepped and draped in the usual sterile fashion.  Indications: multiple ABGs and hemodynamic monitoring  Orientation:  Right  Location: brachial artery  Sedation:  Patient sedated: GA.    Procedure Details:  Sina's test normal: yes  Needle gauge: 22  Seldinger technique: Seldinger technique used  Number of attempts: 2    Post-procedure:  Post-procedure: dressing applied  Waveform: good waveform and waveform confirmed  Post-procedure CNS: unchanged  Patient tolerance: patient tolerated the procedure well with no immediate complications          "

## 2024-07-24 NOTE — PLAN OF CARE
Problem: PAIN - ADULT  Goal: Verbalizes/displays adequate comfort level or baseline comfort level  Description: Interventions:  - Encourage patient to monitor pain and request assistance  - Assess pain using appropriate pain scale  - Administer analgesics based on type and severity of pain and evaluate response  - Implement non-pharmacological measures as appropriate and evaluate response  - Consider cultural and social influences on pain and pain management  - Notify physician/advanced practitioner if interventions unsuccessful or patient reports new pain  Outcome: Progressing     Problem: INFECTION - ADULT  Goal: Absence or prevention of progression during hospitalization  Description: INTERVENTIONS:  - Assess and monitor for signs and symptoms of infection  - Monitor lab/diagnostic results  - Monitor all insertion sites, i.e. indwelling lines, tubes, and drains  - Monitor endotracheal if appropriate and nasal secretions for changes in amount and color  - [place] appropriate cooling/warming therapies per order  - Administer medications as ordered  - Instruct and encourage patient and family to use good hand hygiene technique  - Identify and instruct in appropriate isolation precautions for identified infection/condition  Outcome: Progressing     Problem: SAFETY ADULT  Goal: Patient will remain free of falls  Description: INTERVENTIONS:  - Educate patient/family on patient safety including physical limitations  - Instruct patient to call for assistance with activity   - Consult OT/PT to assist with strengthening/mobility   - Keep Call bell within reach  - Keep bed low and locked with side rails adjusted as appropriate  - Keep care items and personal belongings within reach  - Initiate and maintain comfort rounds  - Make Fall Risk Sign visible to staff  - Offer Toileting every 2 Hours, in advance of need  - Initiate/Maintain bed alarm  - Apply yellow socks and bracelet for high fall risk patients  - Consider  moving patient to room near nurses station  Outcome: Progressing  Goal: Maintain or return to baseline ADL function  Description: INTERVENTIONS:  -  Assess patient's ability to carry out ADLs; assess patient's baseline for ADL function and identify physical deficits which impact ability to perform ADLs (bathing, care of mouth/teeth, toileting, grooming, dressing, etc.)  - Assess/evaluate cause of self-care deficits   - Assess range of motion  - Assess patient's mobility; develop plan if impaired  - Assess patient's need for assistive devices and provide as appropriate  - Encourage maximum independence but intervene and supervise when necessary  - Involve family in performance of ADLs  - Assess for home care needs following discharge   - Consider OT consult to assist with ADL evaluation and planning for discharge  - Provide patient education as appropriate  Outcome: Progressing  Goal: Maintains/Returns to pre admission functional level  Description: INTERVENTIONS:  - Perform AM-PAC 6 Click Basic Mobility/ Daily Activity assessment daily.  - Set and communicate daily mobility goal to care team and patient/family/caregiver.   - Collaborate with rehabilitation services on mobility goals if consulted  - Stand patient 3 times a day  - Ambulate patient 3 times a day  - Out of bed to chair 3 times a day   - Out of bed for meals 3 times a day  - Out of bed for toileting  - Record patient progress and toleration of activity level   Outcome: Progressing     Problem: DISCHARGE PLANNING  Goal: Discharge to home or other facility with appropriate resources  Description: INTERVENTIONS:  - Identify barriers to discharge w/patient and caregiver  - Arrange for needed discharge resources and transportation as appropriate  - Identify discharge learning needs (meds, wound care, etc.)  - Arrange for interpretive services to assist at discharge as needed  - Refer to Case Management Department for coordinating discharge planning if the  patient needs post-hospital services based on physician/advanced practitioner order or complex needs related to functional status, cognitive ability, or social support system  Outcome: Progressing     Problem: Knowledge Deficit  Goal: Patient/family/caregiver demonstrates understanding of disease process, treatment plan, medications, and discharge instructions  Description: Complete learning assessment and assess knowledge base.  Interventions:  - Provide teaching at level of understanding  - Provide teaching via preferred learning methods  Outcome: Progressing     Problem: CARDIOVASCULAR - ADULT  Goal: Maintains optimal cardiac output and hemodynamic stability  Description: INTERVENTIONS:  - Monitor I/O, vital signs and rhythm  - Monitor for S/S and trends of decreased cardiac output  - Administer and titrate ordered vasoactive medications to optimize hemodynamic stability  - Assess quality of pulses, skin color and temperature  - Assess for signs of decreased coronary artery perfusion  - Instruct patient to report change in severity of symptoms  Outcome: Progressing     Problem: METABOLIC, FLUID AND ELECTROLYTES - ADULT  Goal: Electrolytes maintained within normal limits  Description: INTERVENTIONS:  - Monitor labs and assess patient for signs and symptoms of electrolyte imbalances  - Administer electrolyte replacement as ordered  - Monitor response to electrolyte replacements, including repeat lab results as appropriate  - Instruct patient on fluid and nutrition as appropriate  Outcome: Progressing  Goal: Fluid balance maintained  Description: INTERVENTIONS:  - Monitor labs   - Monitor I/O and WT  - Instruct patient on fluid and nutrition as appropriate  - Assess for signs & symptoms of volume excess or deficit  Outcome: Progressing  Goal: Glucose maintained within target range  Description: INTERVENTIONS:  - Monitor Blood Glucose as ordered  - Assess for signs and symptoms of hyperglycemia and hypoglycemia  -  Administer ordered medications to maintain glucose within target range  - Assess nutritional intake and initiate nutrition service referral as needed  Outcome: Progressing     Problem: HEMATOLOGIC - ADULT  Goal: Maintains hematologic stability  Description: INTERVENTIONS  - Assess for signs and symptoms of bleeding or hemorrhage  - Monitor labs  - Administer supportive blood products/factors as ordered and appropriate  Outcome: Progressing     Problem: MUSCULOSKELETAL - ADULT  Goal: Maintain or return mobility to safest level of function  Description: INTERVENTIONS:  - Assess patient's ability to carry out ADLs; assess patient's baseline for ADL function and identify physical deficits which impact ability to perform ADLs (bathing, care of mouth/teeth, toileting, grooming, dressing, etc.)  - Assess/evaluate cause of self-care deficits   - Assess range of motion  - Assess patient's mobility  - Assess patient's need for assistive devices and provide as appropriate  - Encourage maximum independence but intervene and supervise when necessary  - Involve family in performance of ADLs  - Assess for home care needs following discharge   - Consider OT consult to assist with ADL evaluation and planning for discharge  - Provide patient education as appropriate  Outcome: Progressing  Goal: Maintain proper alignment of affected body part  Description: INTERVENTIONS:  - Support, maintain and protect limb and body alignment  - Provide patient/ family with appropriate education  Outcome: Progressing     Problem: Prexisting or High Potential for Compromised Skin Integrity  Goal: Skin integrity is maintained or improved  Description: INTERVENTIONS:  - Identify patients at risk for skin breakdown  - Assess and monitor skin integrity  - Assess and monitor nutrition and hydration status  - Monitor labs   - Assess for incontinence   - Turn and reposition patient  - Assist with mobility/ambulation  - Relieve pressure over bony  prominences  - Avoid friction and shearing  - Provide appropriate hygiene as needed including keeping skin clean and dry  - Evaluate need for skin moisturizer/barrier cream  - Collaborate with interdisciplinary team   - Patient/family teaching  - Consider wound care consult   Outcome: Progressing

## 2024-07-24 NOTE — OP NOTE
OPERATIVE REPORT  PATIENT NAME: Anamaria Parnell    :  1945  MRN: [id number]  Pt Location: [facility] 09    SURGERY DATE: 2024    Surgeons and Role:  Panel 1:     * Jose Crum, DO - Primary     * Dennys De La Cruz MD - Assisting     * Dennys Grace MD - Assisting     * Harvey Rey DO - Fellow    Preop Diagnosis:  Claudication in peripheral vascular disease (HCC) [I73.9]  Right hallux gangrene  Critical limb threatening ischemia bilateral lower extremities  Aortoiliac occlusive disease    Post-Op Diagnosis Codes:     * Claudication in peripheral vascular disease (HCC) [I73.9]  Same    Procedure(s):  Bilateral  common femoral endarterectomy/profundoplasty with bovine pericardial patch plasty  Abdominal pelvic angiography/bilateral iliofemoral angiography, right lower extremity completion arteriogram  Left common/External iliac standard balloon angioplasty  Left common/external iliac shockwave lithotripsy  Left common iliac VBX stent placement  Left external iliac drug-coated stent placement  Left to right 8 mm ringed PTFE femoral-femoral bypass  Bilateral groin subcutaneous groin VAC placement    Specimen(s):  * No specimens in log *    IVF: 4000ml crystalloids  Urine output: 400 mL  Estimated Blood Loss:   1750 mL  Blood products: 3 units of packed RBCs, 1 unit of FFP  Heparin: 15,000  Protamine 50 mg  Fluoroscopy time 51.7 minutes  Contrast volume: 160 cc Visipaque 320  DAP: 269.34    Drains:  Urethral Catheter Latex 16 Fr. (Active)   Number of days: 0       Anesthesia Type:   General    Operative Indications:  Claudication in peripheral vascular disease (HCC) [I73.9]  Patient is 79-year-old woman with extensive suprainguinal/infrainguinal occlusive disease with a gangrenous right hallux.  She underwent noninvasive imaging including an arterial duplex which demonstrated a right LISA of 0.11 and left LISA 0.33.  A CTA had demonstrated extensive aortoiliac disease with a shaggy aorta with  laminated thrombus and heavy calcification of bilateral iliac arteries, and common femoral arteries.  She has extensive multilevel infrainguinal occlusive disease as well.  After lengthy conversation with patient and her son right common femoral endarterectomy with retrograde iliac stenting and femoral to below-knee popliteal bypass recommended.  The procedure, risk, benefits, alternatives, and anticipated postop course were discussed in detail.  Patient was agreeable to proceed.  Written consent was obtained.  Of note patient's son Austin was called intraoperatively once we were unable to successfully cross the right iliac artery and notified of need to modify procedure versus aborting attempted right lower extremity revascularization.  Patient verbalized that his mother understood the risk and would wish to proceed.  As such the procedure was modified to bilateral common femoral endarterectomies/profundoplasty with bovine pericardial patch, retrograde left iliac intervention with a left to right femorofemoral bypass.    Operative Findings:  -Unable to cross the right iliac artery due to heavily calcified eccentric plaque with focal occlusion.  - Able to successfully cross at the left iliac artery which was ultimately treated using 4 mm and 6 mm standard balloon angioplasty, intravascular lithotripsy (shockwave), left common iliac stenting with  8 x 39 mm VBX, left external iliac Opal stent 7 x 100 mm, and a left to right 8 mm ringed PTFE femoral femoral bypass.  Right lower extremity completion arteriogram demonstrated brisk flow through the left to right femorofemoral bypass with outflow via the right profunda femoris with reconstitution of heavily diseased distal SFA/above-knee popliteal artery.  The right below-knee popliteal artery appeared relatively disease-free.  Dominant infrapopliteal runoff via the posterior tibial artery which reconstitutes the distal anterior tibial artery.  -High bifurcation of left  common femoral artery at the level of the inguinal ligament.  The plantar vessels opacifying in the foot.  -Due to the length of the procedure (greater than 8 hours) bilateral groins were VAC following closure of the subcutaneous tissue.      Complications:   None immediately apparent    Procedure and Technique:  The patient was prepped identified in the preop holding area.  Patient name, laterality, and age procedure verified.  The patient was brought to the operating room was positioned supine on the OR table.  After adequate and some general anesthesia via endotracheal tube a right proximal radial artery monitoring line was placed on ultrasound guidance.  A Mclaughlin catheter was inserted under sterile conditions.  A preoperative dose of antibiotics was administered and redosed at the appropriate time interval.  Patient's lower abdomen bilateral groins and right lower extremity were prepped and draped in usual sterile fashion.  Skin Ioban barrier was also placed.  A formal timeout was performed and all were in agreement.  A right groin vertical skin incision was carried out with a #15 scalpel.  This was deepened down through the subcutaneous tissue using electrocautery.  Self-retaining retractors were positioned to facilitate exposure.  Dissection was carried down onto the heavily calcified right common femoral artery using combination of Metzenbaum scissors and electrocautery.  The distal right external iliac artery was circumferentially dissected free and encircled with a vessel loop.  Additional circumflex branches were encircled using additional Vesseloops.  The dissection was carried distally in the proximal SFA and the profunda femoris down to its secondary branches were dissected free and encircled with additional Vesseloops.  At this time a micropuncture needle was used to access the right common femoral artery.  A microwire was advanced into the right external iliac artery.  The needle was exchanged for a  micro sheath.  Right iliofemoral angiography was performed.  A 5 Mongolian sheath was placed.  The patient was systemically heparinized.  Of note additional aliquots were administered throughout the case based on ACT values.  A total of 15,000 units of IV heparin was given.  Under roadmap imaging using multiple guidewires and catheters attempts at crossing the heavily calcified right external iliac/common iliac artery were unsuccessful.  Patient had multiple high-grade stenosis with eccentric plaque and focal segmental occlusion of the distal right common iliac artery.  Ultimately decision was made to access the left common femoral artery under ultrasound guidance.  A microsheath was inserted.  Left iliofemoral angiography was performed.The micro sheath was exchanged out for a 5 Mongolian sheath.  Under roadmap imaging a wire/catheter advanced into the infrarenal abdominal aorta with great difficulty.  Lumen angioplasty using a 4 mm followed by 6 mm balloon  of the left common iliac and external iliac arteries was carried out.  Interval digital subtraction imaging continues to demonstrate significant residual disease.  Ultimately intravascular lithotripsy (shockwave) was performed over an 014 wire using an 8 mm x 60 mm balloon.  The 5 Mongolian sheath was next upsized to a 7 Mongolian sheath.  Next, an 8 mm x 39 mm VBX stent was advanced over an 035 guidewire and deployed in the left common iliac artery with its distal extent proximal to the bifurcation.  At this time the guidewire was left in place.  Decision was made to proceed with a left to right femoral-femoral bypass.  Using a 2 team approach common femoral endarterectomies were performed.  A left groin vertical skin incision was carried out proximal to the skin insertion site of the 7 Mongolian sheath.  Dissection was carried down through the subcutaneous tissue using the electrocautery.  Self-retaining retractors were positioned.  Dissection was carried along the sheath  down to the common femoral artery.  The dissection was carried cephalad to the inguinal ligament which was released medially and laterally.  The distal left external iliac artery was circumferentially dissected free and encircled with a vessel loop.    Of note patient had a high left common femoral artery bifurcation at the level of the inguinal ligament.  The dissection was carried distally in the proximal SFA and profunda femoris were also encircled with Vesseloops.  At this point bilateral common femoral endarterectomies were carried out in a simultaneous fashion.  The vessels were occluded using combination of atraumatic vascular clamps and Vesseloops.  The left 7 R[place]an sheath was removed prior to clamping the distal left external iliac artery.  Arteriotomies were carried out using #11 scalpel and extended with Glynn scissors from the profunda femoris onto the common femoral arteries.  [place] elevator were used to establish the endarterectomy plane.  The plaque was transected sharply using Glynn scissors both proximally and distally.  7-0 Prolene tacking sutures were placed where needed.  The endarterectomized surfaces were copiously irrigated with heparinized saline.  Any visible intimal fronds/plaque were removed using fine forceps.  Bovine pericardial patches were next opened, prepped and brought onto the field.  The patches was sewn into place using 5-0 prolene sutures in a running fashion.    Prior to completing the patch plasty the vessels were appropriately bled and flushed with heparinized saline.  The patch plasty was completed and suture line secured.  The clamps and Vesseloops were released.  Next, the left bovine patch was accessed using a micropuncture needle and the proximal profunda femoris portion of the patch.  A microsheath was inserted.  Repeat digital subtraction imaging was performed.  A guidewire was next advanced into the infrarenal abdominal aorta.  The micro sheath was exchanged out for  a 7 Georgian sheath.  A 7 mm x 100 mm drug-eluting stent (Opal) was next opened, prepped and brought in the field.  This was advanced over the guidewire and positioned at the left external iliac artery just distal to the takeoff of the left hypogastric artery.  Once appropriate position was confirmed the stent was deployed without difficulty.  The distal end of the stent extended down to the top of the left femoral head.  The stent was postdilated using a 6 mm balloon.  Repeat digital subtraction imaging demonstrated brisk flow through the previously placed VBX stent and Opal stent.  No additional endovascular interventions were carried out.  Attention was now turned towards the left to right femoral-femoral bypass portion of the case.  A subcutaneous tunnel was created from the right groin to the left groin surgical sites using a Alicia-Wick tunneler.  An 8 mm ringed PTFE graft was brought through the tunnel.    Once again using a 2 team approach the vessels were reoccluded using clamps and Vesseloops.  The 7 Georgian sheath has been removed.  Patchotomies were performed with an 11 scalpel and extended with Glynn scissors.  The graft was cut to the appropriate size.  The ends were spatulated. End to side anastomoses were fashioned using 5-0 Prolene running sutures.  Prior to bleeding anastomoses the vessels were appropriately bled, de-aired and flushed with heparinized saline.  The anastomoses were completed and suture line secured.  The clamps and Vesseloops were released.  Several 5 -0 and 6-0 Prolene sutures were placed.  Topical hemostatic agents consisting of thrombin and Gelfoam were used around the anastomoses.  The left to right femorofemoral bypass was next accessed using a micropuncture needle just distal to the left femoral anastomosis.  A microsheath was inserted.  A right lower extremity runoff was performed and a stepwise fashion.  Please refer to the above findings.  The groins were next copiously  irrigated using Irrisept solution.  Protamine 50 mg was given.  Once hemostasis was deemed satisfactory the layers were closed using Monocryl sutures.  The skin and superficial subcutaneous tissue were left open due to the patient's body habitus and overall length of procedure.  Single piece of black sponges was cut to the appropriate sizes and placed in each groin.  The VAC sponges were connected to suction with good seal.  All needles, instruments, and sponge counts were reported correct.  Due to the length of the procedure, overall complexity and the patient's high cardiac perioperative risk the patient was kept intubated and transferred directly to the ICU.       I was present for the entire procedure.    Patient Disposition:  Critical Care Unit        SIGNATURE: Jose Crum DO  DATE: July 24, 2024  TIME: 6:02 PM        Vascular Quality Initiative - Peripheral Vascular Intervention     Urgency: Elective    Functional Status:  Capable of only limited self-care, confined to bed or chair 50% or more of waking hours.   Ambulation: Wheelchair = predominate means of motion, except very short distance    Leg Symptoms    Right: Ulcer/necrosis (gangrene): de marcela tissue loss due to peripheral arterial disease, not due to non-healing prior amputation       Tissue Loss Severity: Grade 3, Extensive = extensive deep ulcer or necrosis (gangrene) of the forefoot and/or midfoot with exposed bone, joint or tendon, or full-thickness heel ulcer with or without involvement of the calcaneus (ie, extensive tissue loss: salvageable only with complex foot reconstruction or nontraditional TMA [eg, Chopart or Lisfranc amputation]).     Infection: Grade 2, Moderate = Local infection is present as defined for Grade 1, but extends >2 cm around ulcer, or involves structures deeper than the skin and subcutaneous tissues (eg, abscess, osteomyelitis, septic arthritis, fasciitis). No clinical signs of systemic inflammatory  response.  Left: Ischemic Rest Pain    COVID Information  COVID Symptoms Pre-Procedure: Asymptomatic    Treatment Delayed by Pandemic: None    Access   Number of Sites: 1     Access Site 1:     Side 1: Left    Site 1: Femoral Retrograde    Access Guidance 1:U/S    Largest Sheath Size 1: 7 Fr.    Closure Device 1: None   Other open femoral artery exposure/femoral endarterectomy etc.    Procedure  Fluoro Time: 51.7 minutes  Contrast Volume: Visipaque 160 ml  DAP: To 69.34 Gy.cm2  CO2: no  Anticoagulant: Heparin  Protamine: Yes  If Creatinine is > 1.2 or missing, DAHLIA Prophylaxis none     Treatment Details  Indication: Occlusive Disease,    Completion Assessment  Artery 1 treated: Com+Ext Iliac  Left               Outflow: SFA, PROF, POP: 1                  Was this Site previously treated?: No          TASC Grade: C          Total Treated Length: 14 cm          Total Occluded Length: 0 cm          Calcification: Severe (calcification on both sides of artery > half length of lesion)          Number of Treatment types (Devices):   3           Device 1          Treatment Type: Plain Balloon         Device 2          Treatment Type: Stent Graft                Diameter: 8 mm          Length: 39 mm              Device 3          Treatment Type: Stent,  Drug Eluting Stent                Diameter: 7 mm          Length: 100 mm          Concomitant: None          Technical result: Successful (stenosis <=30%)      None     Post Procedure  Patient currently taking: Statin, Yes      Antiplatelet Medication, Yes    Procedure Complications: No

## 2024-07-25 LAB
ABO GROUP BLD BPU: NORMAL
ANION GAP SERPL CALCULATED.3IONS-SCNC: 9 MMOL/L (ref 4–13)
BASE EXCESS BLDA CALC-SCNC: -6.4 MMOL/L
BPU ID: NORMAL
BUN SERPL-MCNC: 30 MG/DL (ref 5–25)
CALCIUM SERPL-MCNC: 8.2 MG/DL (ref 8.4–10.2)
CHLORIDE SERPL-SCNC: 112 MMOL/L (ref 96–108)
CO2 SERPL-SCNC: 18 MMOL/L (ref 21–32)
CREAT SERPL-MCNC: 1.59 MG/DL (ref 0.6–1.3)
CROSSMATCH: NORMAL
CROSSMATCH: NORMAL
ERYTHROCYTE [DISTWIDTH] IN BLOOD BY AUTOMATED COUNT: 14.8 % (ref 11.6–15.1)
GFR SERPL CREATININE-BSD FRML MDRD: 31 ML/MIN/1.73SQ M
GLUCOSE SERPL-MCNC: 162 MG/DL (ref 65–140)
GLUCOSE SERPL-MCNC: 165 MG/DL (ref 65–140)
GLUCOSE SERPL-MCNC: 168 MG/DL (ref 65–140)
GLUCOSE SERPL-MCNC: 179 MG/DL (ref 65–140)
GLUCOSE SERPL-MCNC: 182 MG/DL (ref 65–140)
HCO3 BLDA-SCNC: 18.3 MMOL/L (ref 22–28)
HCT VFR BLD AUTO: 28.1 % (ref 34.8–46.1)
HGB BLD-MCNC: 9.4 G/DL (ref 11.5–15.4)
HOROWITZ INDEX BLDA+IHG-RTO: 6 MM[HG]
MCH RBC QN AUTO: 30.2 PG (ref 26.8–34.3)
MCHC RBC AUTO-ENTMCNC: 33.5 G/DL (ref 31.4–37.4)
MCV RBC AUTO: 90 FL (ref 82–98)
O2 CT BLDA-SCNC: <10 ML/DL (ref 16–23)
OXYHGB MFR BLDA: 97.7 % (ref 94–97)
PCO2 BLDA: 33.6 MM HG (ref 36–44)
PEEP RESPIRATORY: 40 CM[H2O]
PH BLDA: 7.36 [PH] (ref 7.35–7.45)
PLATELET # BLD AUTO: 310 THOUSANDS/UL (ref 149–390)
PMV BLD AUTO: 10.3 FL (ref 8.9–12.7)
PO2 BLDA: 133.2 MM HG (ref 75–129)
POTASSIUM SERPL-SCNC: 4.5 MMOL/L (ref 3.5–5.3)
RBC # BLD AUTO: 3.11 MILLION/UL (ref 3.81–5.12)
SODIUM SERPL-SCNC: 139 MMOL/L (ref 135–147)
SPECIMEN SOURCE: ABNORMAL
UNIT DISPENSE STATUS: NORMAL
UNIT PRODUCT CODE: NORMAL
UNIT PRODUCT VOLUME: 289 ML
UNIT PRODUCT VOLUME: 350 ML
UNIT PRODUCT VOLUME: 350 ML
UNIT RH: NORMAL
VENT AC: 14
VENT- AC: AC
VT SETTING VENT: 320 ML
WBC # BLD AUTO: 25.83 THOUSAND/UL (ref 4.31–10.16)

## 2024-07-25 PROCEDURE — 94003 VENT MGMT INPAT SUBQ DAY: CPT

## 2024-07-25 PROCEDURE — 82948 REAGENT STRIP/BLOOD GLUCOSE: CPT

## 2024-07-25 PROCEDURE — 85027 COMPLETE CBC AUTOMATED: CPT | Performed by: STUDENT IN AN ORGANIZED HEALTH CARE EDUCATION/TRAINING PROGRAM

## 2024-07-25 PROCEDURE — 94760 N-INVAS EAR/PLS OXIMETRY 1: CPT

## 2024-07-25 PROCEDURE — 97168 OT RE-EVAL EST PLAN CARE: CPT

## 2024-07-25 PROCEDURE — 99233 SBSQ HOSP IP/OBS HIGH 50: CPT | Performed by: INTERNAL MEDICINE

## 2024-07-25 PROCEDURE — 80048 BASIC METABOLIC PNL TOTAL CA: CPT | Performed by: STUDENT IN AN ORGANIZED HEALTH CARE EDUCATION/TRAINING PROGRAM

## 2024-07-25 PROCEDURE — 94150 VITAL CAPACITY TEST: CPT

## 2024-07-25 PROCEDURE — 97530 THERAPEUTIC ACTIVITIES: CPT

## 2024-07-25 PROCEDURE — 99232 SBSQ HOSP IP/OBS MODERATE 35: CPT | Performed by: STUDENT IN AN ORGANIZED HEALTH CARE EDUCATION/TRAINING PROGRAM

## 2024-07-25 PROCEDURE — 82805 BLOOD GASES W/O2 SATURATION: CPT | Performed by: PHYSICIAN ASSISTANT

## 2024-07-25 PROCEDURE — 99024 POSTOP FOLLOW-UP VISIT: CPT | Performed by: SURGERY

## 2024-07-25 PROCEDURE — NC001 PR NO CHARGE: Performed by: PODIATRIST

## 2024-07-25 PROCEDURE — 99291 CRITICAL CARE FIRST HOUR: CPT | Performed by: INTERNAL MEDICINE

## 2024-07-25 RX ORDER — FUROSEMIDE 10 MG/ML
40 INJECTION INTRAMUSCULAR; INTRAVENOUS ONCE
Status: COMPLETED | OUTPATIENT
Start: 2024-07-25 | End: 2024-07-25

## 2024-07-25 RX ADMIN — SODIUM BICARBONATE 650 MG TABLET 650 MG: at 07:30

## 2024-07-25 RX ADMIN — HEPARIN SODIUM 5000 UNITS: 5000 INJECTION INTRAVENOUS; SUBCUTANEOUS at 13:05

## 2024-07-25 RX ADMIN — SODIUM CHLORIDE, SODIUM LACTATE, POTASSIUM CHLORIDE, AND CALCIUM CHLORIDE 150 ML/HR: .6; .31; .03; .02 INJECTION, SOLUTION INTRAVENOUS at 08:12

## 2024-07-25 RX ADMIN — PRAVASTATIN SODIUM 80 MG: 80 TABLET ORAL at 15:43

## 2024-07-25 RX ADMIN — CHLORHEXIDINE GLUCONATE 15 ML: 1.2 RINSE ORAL at 08:05

## 2024-07-25 RX ADMIN — HEPARIN SODIUM 5000 UNITS: 5000 INJECTION INTRAVENOUS; SUBCUTANEOUS at 05:50

## 2024-07-25 RX ADMIN — INSULIN LISPRO 1 UNITS: 100 INJECTION, SOLUTION INTRAVENOUS; SUBCUTANEOUS at 10:36

## 2024-07-25 RX ADMIN — AMLODIPINE BESYLATE 5 MG: 5 TABLET ORAL at 08:06

## 2024-07-25 RX ADMIN — HEPARIN SODIUM 5000 UNITS: 5000 INJECTION INTRAVENOUS; SUBCUTANEOUS at 21:15

## 2024-07-25 RX ADMIN — ASPIRIN 81 MG CHEWABLE TABLET 81 MG: 81 TABLET CHEWABLE at 08:05

## 2024-07-25 RX ADMIN — FUROSEMIDE 40 MG: 10 INJECTION, SOLUTION INTRAMUSCULAR; INTRAVENOUS at 10:07

## 2024-07-25 RX ADMIN — INSULIN LISPRO 1 UNITS: 100 INJECTION, SOLUTION INTRAVENOUS; SUBCUTANEOUS at 08:20

## 2024-07-25 RX ADMIN — ACETAMINOPHEN 975 MG: 325 TABLET, FILM COATED ORAL at 21:15

## 2024-07-25 RX ADMIN — FUROSEMIDE 40 MG: 10 INJECTION, SOLUTION INTRAMUSCULAR; INTRAVENOUS at 15:43

## 2024-07-25 RX ADMIN — TICAGRELOR 90 MG: 90 TABLET ORAL at 21:15

## 2024-07-25 RX ADMIN — SODIUM CHLORIDE, SODIUM LACTATE, POTASSIUM CHLORIDE, AND CALCIUM CHLORIDE 75 ML/HR: .6; .31; .03; .02 INJECTION, SOLUTION INTRAVENOUS at 00:49

## 2024-07-25 RX ADMIN — ACETAMINOPHEN 975 MG: 325 TABLET, FILM COATED ORAL at 05:50

## 2024-07-25 RX ADMIN — ESCITALOPRAM OXALATE 20 MG: 20 TABLET ORAL at 08:05

## 2024-07-25 RX ADMIN — OXYCODONE HYDROCHLORIDE 5 MG: 5 TABLET ORAL at 13:29

## 2024-07-25 RX ADMIN — METOPROLOL SUCCINATE 100 MG: 50 TABLET, EXTENDED RELEASE ORAL at 08:05

## 2024-07-25 RX ADMIN — ACETAMINOPHEN 975 MG: 325 TABLET, FILM COATED ORAL at 13:05

## 2024-07-25 RX ADMIN — PROPOFOL 20 MCG/KG/MIN: 10 INJECTION, EMULSION INTRAVENOUS at 06:03

## 2024-07-25 RX ADMIN — TICAGRELOR 90 MG: 90 TABLET ORAL at 08:05

## 2024-07-25 RX ADMIN — TRAZODONE HYDROCHLORIDE 50 MG: 50 TABLET ORAL at 21:15

## 2024-07-25 RX ADMIN — ALLOPURINOL 100 MG: 100 TABLET ORAL at 08:05

## 2024-07-25 RX ADMIN — SODIUM BICARBONATE 650 MG TABLET 650 MG: at 17:06

## 2024-07-25 RX ADMIN — CEFAZOLIN SODIUM 1000 MG: 1 SOLUTION INTRAVENOUS at 08:05

## 2024-07-25 RX ADMIN — INSULIN LISPRO 1 UNITS: 100 INJECTION, SOLUTION INTRAVENOUS; SUBCUTANEOUS at 15:46

## 2024-07-25 RX ADMIN — SODIUM CHLORIDE, SODIUM LACTATE, POTASSIUM CHLORIDE, AND CALCIUM CHLORIDE 1000 ML: .6; .31; .03; .02 INJECTION, SOLUTION INTRAVENOUS at 07:06

## 2024-07-25 RX ADMIN — LIDOCAINE 5% 1 PATCH: 700 PATCH TOPICAL at 08:05

## 2024-07-25 RX ADMIN — SODIUM CHLORIDE, SODIUM LACTATE, POTASSIUM CHLORIDE, AND CALCIUM CHLORIDE 500 ML: .6; .31; .03; .02 INJECTION, SOLUTION INTRAVENOUS at 00:07

## 2024-07-25 NOTE — ASSESSMENT & PLAN NOTE
Lab Results   Component Value Date    EGFR 35 07/24/2024    EGFR 34 07/24/2024    EGFR 35 07/23/2024    CREATININE 1.41 (H) 07/24/2024    CREATININE 1.46 (H) 07/24/2024    CREATININE 1.43 (H) 07/23/2024   -monitor renal fxn  -avoid nephrotoxins  -baseline creat 1.4-1.7  -was on bicarb tabs for metabolic acidosis   -will continue when takes po  -will give iv bicarb if needed

## 2024-07-25 NOTE — PROGRESS NOTES
[facility]  Progress Note  Name: Anamaria Parnell I  MRN: [id number]  Unit/Bed#: ICU 08 I Date of Admission: 7/19/2024   Date of Service: 7/25/2024 I Hospital Day: 6    Assessment & Plan   Coronary artery disease of native artery of native heart with stable angina pectoris (HCC)  Assessment & Plan  -cardiology following  -echo 7/21- ef 41%, severe LAE, mod MR, mild TR   -restart po meds when extubated    Primary hypertension  Assessment & Plan  -stable at this time  -hold po meds  -will give lopressor IV if needed o/n     Aortoiliac occlusive disease (HCC)  Assessment & Plan  -prolonged surgery. pod #1  -arrived to ICU post-op on 7/24  -will continue ett and pain control t/o night   -plan to extubate this am  -Pt had--Bilateral  common femoral endarterectomy/profundoplasty with bovine pericardial patch plasty  Abdominal pelvic angiography/bilateral iliofemoral angiography, right lower extremity completion arteriogram  Left common/External iliac standard balloon angioplasty  Left common/external iliac shockwave lithotripsy  Left common iliac VBX stent placement  Left external iliac drug-coated stent placement  Left to right 8 mm ringed PTFE femoral-femoral bypass  Bilateral groin subcutaneous groin VAC placement  -ebl 1500-2L  -transfused 3u prbc and 1ffp  -4L IVF given  -cont NV checks  -cont brachial homa  -wean fio2  -recheck labs  -pain control  -wound vacs in place    Type 2 diabetes mellitus, without long-term current use of insulin (HCC)  Assessment & Plan  Lab Results   Component Value Date    HGBA1C 5.9 (H) 07/09/2024       Recent Labs     07/23/24  1613 07/23/24  2103 07/24/24  0734 07/24/24  2138   POCGLU 159* 148* 138 189*         Blood Sugar Average: Last 72 hrs:  (P) 138  -ssi  -accuchecks     Depression, recurrent (HCC)  Assessment & Plan  -home meds on hold preop  -restart when able    Stage 4 chronic kidney disease (HCC)  Assessment & Plan  Lab Results   Component Value  Date    EGFR 35 07/24/2024    EGFR 34 07/24/2024    EGFR 35 07/23/2024    CREATININE 1.41 (H) 07/24/2024    CREATININE 1.46 (H) 07/24/2024    CREATININE 1.43 (H) 07/23/2024   -monitor renal fxn  -avoid nephrotoxins  -baseline creat 1.4-1.7  -was on bicarb tabs for metabolic acidosis   -will continue when takes po   -will give iv bicarb if needed  -abg and labs pending    CVA (cerebral vascular accident) (HCC)  Assessment & Plan  7/28/2023 for strokelike symptoms.  At that time MRI showed left PCA and left MCA strokes   -restart brulinta and asa when cleared by vascular and taking po     * Gangrene ([facility])  Assessment & Plan  -podiatry and vascular following   -cont local wound care as instructed             Disposition: Critical care    ICU Core Measures     Vented Patient  VAP Bundle  VAP bundle ordered     A: Assess, Prevent, and Manage Pain Has pain been assessed? Yes  Need for changes to pain regimen? No   B: Both Spontaneous Awakening Trials (SATs) and Spontaneous Breathing Trials (SBTs) Plan to perform spontaneous awakening trial today? Yes   Plan to perform spontaneous breathing trial today? Yes   Obvious barriers to extubation? No   C: Choice of Sedation RASS Goal: -2 Light Sedation or 0 Alert and Calm  Need for changes to sedation or analgesia regimen? No   D: Delirium CAM-ICU: Positive   E: Early Mobility  Plan for early mobility? Yes   F: Family Engagement Plan for family engagement today? Yes       Antibiotic Review: Post op requirements     Review of Invasive Devices:    Arnoldo Plan: Continue for accurate I/O monitoring for 48 hours    Marilin Plan: Keep arterial line for hemodynamic monitoring, frequent ABGs, and frequent labs    Prophylaxis:  VTE VTE covered by:  heparin (porcine), Subcutaneous, 5,000 Units at 07/24/24 2129       Stress Ulcer  not ordered         Significant 24hr Events     24hr events: uop decreased around midnight and pt given additional 500 LR and gtt increased to 150ml/hr.       Subjective   Review of Systems: Review of Systems   Unable to perform ROS: Intubated        Objective                            Vitals I/O      Most Recent Min/Max in 24hrs   Temp 97.5 °F (36.4 °C) Temp  Min: 97.5 °F (36.4 °C)  Max: 97.7 °F (36.5 °C)   Pulse 71 Pulse  Min: 68  Max: 97   Resp 12 Resp  Min: 9  Max: 22   /57 BP  Min: 112/57  Max: 218/108   O2 Sat 100 % SpO2  Min: 98 %  Max: 100 %      Intake/Output Summary (Last 24 hours) at 7/25/2024 0438  Last data filed at 7/25/2024 0326  Gross per 24 hour   Intake 6762.18 ml   Output 2265 ml   Net 4497.18 ml       Diet Kaveh/CHO Controlled; Consistent Carbohydrate Diet Level 2 (5 carb servings/75 grams CHO/meal)    Invasive Monitoring   Arterial Line  Marilin /41  Arterial Line BP  Min: 110/38  Max: 233/111   MAP 66 mmHg  Arterial Line MAP (mmHg)  Min: 61 mmHg  Max: 162 mmHg           Physical Exam   Physical Exam  Vitals and nursing note reviewed.   Eyes:      Pupils: Pupils are equal, round, and reactive to light.   Skin:     General: Skin is warm and dry.      Comments: Incision c/d without infxn. Wound vac in place.   HENT:      Head: Normocephalic and atraumatic.   Cardiovascular:      Rate and Rhythm: Normal rate and regular rhythm.      Heart sounds: Normal heart sounds.   Musculoskeletal:      Comments: Right toe gangrene. foot dressed and Doppler pulses b/l   Abdominal:      Palpations: Abdomen is soft.      Tenderness: There is no abdominal tenderness.   Constitutional:       Appearance: She is well-developed and well-nourished.   Pulmonary:      Effort: Pulmonary effort is normal.      Comments: Ett in place  Neurological:      General: No focal deficit present.      Mental Status: She is alert. She is somnolent.        Cough reflex.      Comments: Lucy.    Genitourinary/Anorectal:  Mclaughlin present.          Diagnostic Studies      EKG: nsr on tele  Imaging:  I have personally reviewed pertinent reports.   and I have personally reviewed pertinent  films in PACS     Medications:  Scheduled PRN   acetaminophen, 975 mg, Q8H JAVIER  allopurinol, 100 mg, Daily  amLODIPine, 5 mg, Daily  aspirin, 81 mg, Daily  cefazolin, 1,000 mg, Q12H  chlorhexidine, 15 mL, Q12H JAVIER  cloNIDine, 1 patch, Weekly  escitalopram, 20 mg, Daily  heparin (porcine), 5,000 Units, Q8H JAVIER  insulin lispro, 1-5 Units, TID AC  lidocaine, 1 patch, Daily  metoprolol succinate, 100 mg, Daily  pravastatin, 80 mg, Daily With Dinner  sodium bicarbonate, 650 mg, BID after meals  ticagrelor, 90 mg, Q12H JAVIER  traZODone, 50 mg, HS      fentaNYL, 50 mcg, Q5 Min PRN  HYDROmorphone, 0.5 mg, Q5 Min PRN  HYDROmorphone, 0.2 mg, Q6H PRN  lactated ringers, 500 mL, Once PRN   And  lactated ringers, 500 mL, Once PRN  LORazepam, 0.5 mg, Q8H PRN  ondansetron, 4 mg, Q6H PRN  ondansetron, 4 mg, Once PRN  oxyCODONE, 5 mg, Q6H PRN  sodium chloride, 500 mL, Once PRN   And  sodium chloride, 500 mL, Once PRN       Continuous    fentaNYL, 25 mcg/hr, Last Rate: 25 mcg/hr (07/24/24 1911)  lactated ringers, 150 mL/hr, Last Rate: 150 mL/hr (07/25/24 0118)  propofol, 5-50 mcg/kg/min, Last Rate: 20 mcg/kg/min (07/25/24 0330)         Labs:    CBC    Recent Labs     07/24/24  0456 07/24/24  0901 07/24/24  1551 07/24/24 1939   WBC 13.51*  --   --  32.36*   HGB 10.2*   < > 7.8* 11.0*   HCT 31.3*   < > 23* 33.1*     --   --  293   BANDSPCT  --   --   --  2    < > = values in this interval not displayed.     BMP    Recent Labs     07/24/24  0456 07/24/24  0901 07/24/24  1551 07/24/24 1939   SODIUM 139  --   --  141   K 3.6  --   --  5.3     --   --  114*   CO2 23   < > 18* 17*   AGAP 12  --   --  10   BUN 24  --   --  25   CREATININE 1.46*  --   --  1.41*   CALCIUM 8.8  --   --  8.8    < > = values in this interval not displayed.       Coags    Recent Labs     07/24/24  1504   INR 1.51*        Additional Electrolytes  Recent Labs     07/24/24  1341 07/24/24  1551 07/24/24 1939   MG  --   --  1.6*   PHOS  --   --  6.4*    CAIONIZED 1.43* 1.02*  --           Blood Gas    Recent Labs     07/24/24 1939   PHART 7.305*   [id number] 36.5   PO2ART 273.7*   IBF1HES 17.8*   BEART -7.8   SOURCE Line, Arterial     Recent Labs     07/24/24 1939   SOURCE Line, Arterial    LFTs  Recent Labs     07/23/24 0444 07/24/24 1939   ALT <3* 3*   AST 12* 15   ALKPHOS 108* 74   ALB 3.2* 2.7*   TBILI 0.34 0.48       Infectious  No recent results  Glucose  Recent Labs     07/23/24  0444 07/24/24  0456 07/24/24 1939   GLUC 135 127 228*               Hannah Baxter PA-C

## 2024-07-25 NOTE — ASSESSMENT & PLAN NOTE
7/28/2023 for strokelike symptoms.  At that time MRI showed left PCA and left MCA strokes   -restart brulinta and asa when cleared by vascular and taking po

## 2024-07-25 NOTE — PLAN OF CARE
Problem: OCCUPATIONAL THERAPY ADULT  Goal: Performs self-care activities at highest level of function for planned discharge setting.  See evaluation for individualized goals.  Description: Treatment Interventions: ADL retraining, UE strengthening/ROM, Functional transfer training, Endurance training, Cognitive reorientation, Patient/family training, Equipment evaluation/education, Continued evaluation, Compensatory technique education          See flowsheet documentation for full assessment, interventions and recommendations.   Note: Limitation: Decreased ADL status, Decreased UE strength, Decreased Safe judgement during ADL, Decreased endurance, Decreased high-level ADLs  Prognosis: Fair  Assessment: Pt is a 78y/o female initially evaluated on 7/22 2* PAD. Since then, pt had a s/p Bilateral femoral endarterectomies with patch angioplasty; Retrograde iliac intervention, shockwave, stent placement and angioplasty; femoral to femoral bypass with PTFE (Bilateral: Leg Lower), ARTERIOGRAM, VAC placement(7/24). Pt is currently allowed WBAT with her R LE. During re-evaluation, pt demonstrated deficits with her functional balance, functional mobility, ADL status, transfer safety, b/l UE strength, activity tolerance(currently fair=15-20mins), and questionable cognition(i.e.problem-solving). Pt continues to demonstrate appropriateness for OT tx for the above deficits. Goals stated on initial eval remain appropriate with additional goal below. See for details. 3-5xwk/1-2wks. The patient's raw score on the AM-PAC Daily Activity Inpatient Short Form is 15. A raw score of less than 19 suggests the patient may benefit from discharge to post-acute rehabilitation services. Please refer to the recommendation of the Occupational Therapist for safe discharge planning.     Rehab Resource Intensity Level, OT: II (Moderate Resource Intensity)

## 2024-07-25 NOTE — PROGRESS NOTES
"[facility] Podiatry - Progress Note  Patient: Anamaria Parnell 79 y.o. female   MRN: [id number]  PCP: Ritchie Lawton MD  Unit/Bed#: ICU 08 Encounter: [id number]  Date Of Visit: 24    ASSESSMENT:    Anamaria Parnell is a 79 y.o. female with:    Right Hallux Dry Gangrene, Davidson Grade 4  Type 2 Diabetes Mellitus  - A1c: 5/9% (24)  Stage 4 Chronic Kidney Disease  Aortoiliac Occlusive Disease  Peripheral Arterial   Cerebral Vascular Accident      PLAN:    Patient is s/p femoral endarterectomy with left to right femorofemoral bypass and retrograde LIMA stenting as well as bilateral VAC placement on . Per vascular, planning for right fem to pop bypass timing TBD.  Right foot dressing changed today. Gangrene remains dry, stable with no acute clinical signs of infection. Continue local wound care, appreciate nursing assistance with dressing changes. We will continue to monitor while in house.   Elevation on green foam wedges or pillows when non-ambulatory.  Rest of care per primary team.    Weight bearing status: Weightbearing as tolerated in surgical shoe      SUBJECTIVE:     The patient was seen, evaluated, and assessed at bedside today. The patient was awake, alert, and in no acute distress. No acute events overnight. The patient reports no pain her her right foot at this time. Patient denies N/V/F/chills/SOB/CP.      OBJECTIVE:     Vitals:   BP (!) 156/48   Pulse 72   Temp 97.6 °F (36.4 °C) (Oral)   Resp 14   Ht 4' 10\" (1.473 m)   Wt 73.3 kg (161 lb 9.6 oz)   LMP  (LMP Unknown)   SpO2 100%   BMI 33.77 kg/m²     Temp (24hrs), Av.7 °F (36.5 °C), Min:97.5 °F (36.4 °C), Max:97.8 °F (36.6 °C)      Physical Exam:     General:  Alert, cooperative, and in no distress.  Lower extremity exam:  Cardiovascular status at baseline.  Neurological status at baseline.  Musculoskeletal status at baseline. No calf tenderness noted.     RLE: Dry gangrene of the hallux to the level of the MTPJ. Dusky " "discoloration just proximal to the MTPJ with no capillary refill. No fluctuance or crepitus. No open wounds. No drainage.     Clinical Images 07/25/24:            Additional Data:     Labs:    Results from last 7 days   Lab Units 07/25/24 0436 07/24/24 1939 07/23/24 0444 07/22/24  0541   WBC Thousand/uL 25.83* 32.36*   < > 13.02*   HEMOGLOBIN g/dL 9.4* 11.0*   < > 10.6*   I STAT HEMOGLOBIN   --   --    < >  --    HEMATOCRIT % 28.1* 33.1*   < > 32.2*   HEMATOCRIT, ISTAT   --   --    < >  --    PLATELETS Thousands/uL 310 293   < > 351   SEGS PCT %  --   --   --  80*   LYMPHO PCT %  --  4*  --  6*   MONO PCT %  --  3*  --  9   EOS PCT %  --  1  --  2    < > = values in this interval not displayed.     Results from last 7 days   Lab Units 07/25/24 0436 07/24/24 1939 07/24/24  1551   POTASSIUM mmol/L 4.5 5.3  --    CHLORIDE mmol/L 112* 114*  --    CO2 mmol/L 18* 17*  --    CO2, I-STAT mmol/L  --   --  18*   BUN mg/dL 30* 25  --    CREATININE mg/dL 1.59* 1.41*  --    CALCIUM mg/dL 8.2* 8.8  --    ALK PHOS U/L  --  74  --    ALT U/L  --  3*  --    AST U/L  --  15  --    GLUCOSE, ISTAT mg/dl  --   --  179*     Results from last 7 days   Lab Units 07/24/24  1504   INR  1.51*       * I Have Reviewed All Lab Data Listed Above.    Recent Cultures (last 7 days):               Imaging: I have personally reviewed pertinent films in PACS  EKG, Pathology, and Other Studies: I have personally reviewed pertinent reports.      ** Please Note: Portions of the record may have been created with voice recognition software. Occasional wrong word or \"sound a like\" substitutions may have occurred due to the inherent limitations of voice recognition software. Read the chart carefully and recognize, using context, where substitutions have occurred. **      "

## 2024-07-25 NOTE — ASSESSMENT & PLAN NOTE
Lab Results   Component Value Date    EGFR 35 07/24/2024    EGFR 34 07/24/2024    EGFR 35 07/23/2024    CREATININE 1.41 (H) 07/24/2024    CREATININE 1.46 (H) 07/24/2024    CREATININE 1.43 (H) 07/23/2024   -monitor renal fxn  -avoid nephrotoxins  -baseline creat 1.4-1.7  -was on bicarb tabs for metabolic acidosis   -will continue when takes po   -will give iv bicarb if needed  -abg and labs pending

## 2024-07-25 NOTE — ASSESSMENT & PLAN NOTE
Lab Results   Component Value Date    HGBA1C 5.9 (H) 07/09/2024       Recent Labs     07/23/24  1613 07/23/24  2103 07/24/24  0734 07/24/24  2138   POCGLU 159* 148* 138 189*       Blood Sugar Average: Last 72 hrs:  (P) [id number]  -ssi  -accuchecks

## 2024-07-25 NOTE — ASSESSMENT & PLAN NOTE
-prolonged surgery today  -arrived to ICU post-op  -will continue ett and pain control t/o night  -Pt had--Bilateral  common femoral endarterectomy/profundoplasty with bovine pericardial patch plasty  Abdominal pelvic angiography/bilateral iliofemoral angiography, right lower extremity completion arteriogram  Left common/External iliac standard balloon angioplasty  Left common/external iliac shockwave lithotripsy  Left common iliac VBX stent placement  Left external iliac drug-coated stent placement  Left to right 8 mm ringed PTFE femoral-femoral bypass  Bilateral groin subcutaneous groin VAC placement  -ebl 1500-2L  -transfused 3u prbc and 1ffp  -4L IVF given  -cont NV checks  -cont brachial homa  -wean fio2  -recheck labs  -pain control  -wound vacs in place

## 2024-07-25 NOTE — ASSESSMENT & PLAN NOTE
-prolonged surgery. pod #1  -arrived to ICU post-op on 7/24  -will continue ett and pain control t/o night   -plan to extubate this am  -Pt had--Bilateral  common femoral endarterectomy/profundoplasty with bovine pericardial patch plasty  Abdominal pelvic angiography/bilateral iliofemoral angiography, right lower extremity completion arteriogram  Left common/External iliac standard balloon angioplasty  Left common/external iliac shockwave lithotripsy  Left common iliac VBX stent placement  Left external iliac drug-coated stent placement  Left to right 8 mm ringed PTFE femoral-femoral bypass  Bilateral groin subcutaneous groin VAC placement  -ebl 1500-2L  -transfused 3u prbc and 1ffp  -4L IVF given  -cont NV checks  -cont brachial homa  -wean fio2  -recheck labs  -pain control  -wound vacs in place

## 2024-07-25 NOTE — CASE MANAGEMENT
Case Management Discharge Planning Note    Patient name Anamaria Parenll  Location ICU 08/ICU 08 MRN [id number]  : 1945 Date 2024       Current Admission Date: 2024  Current Admission Diagnosis:Gangrene (HCC)   Patient Active Problem List    Diagnosis Date Noted Date Diagnosed    Coronary artery disease of native artery of native heart with stable angina pectoris (HCC) 2024     Cellulitis of toe of right foot 2024     Sepsis (HCC) 2024     Gangrene (HCC) 2024     Diabetic ulcer of toe of right foot associated with diabetes mellitus due to underlying condition, limited to breakdown of skin (HCC) 2024     Carotid stenosis, asymptomatic, right 10/27/2023     Complex renal cyst 10/18/2023     Encounter for follow-up surveillance of urothelial carcinoma of lower urinary tract 10/18/2023     Encounter to discuss test results 10/17/2023     Smoking 2023     Renal cyst 2023     Primary hypertension 2023     Dysarthria 08/15/2023     Stage 3b chronic kidney disease (HCC)      Aortoiliac occlusive disease (HCC) 10/11/2022     History of gastrointestinal stromal tumor (GIST) 2022     Secondary hyperparathyroidism of renal origin (HCC) 2022     Gastrointestinal stromal tumor (GIST) (HCC) 2022     Type 2 diabetes mellitus, without long-term current use of insulin (HCC) 2021     Type 2 diabetes mellitus with diabetic peripheral angiopathy without gangrene (HCC) 2021     Depression, recurrent (HCC) 2021     Stage 4 chronic kidney disease (HCC) 2020     Hypertensive kidney disease with stage 4 chronic kidney disease (HCC) 2020     Cervical radiculopathy 2020     Malignant neoplasm of overlapping sites of bladder (HCC) 2020     Stenosis of left anterior descending artery Mid LAD 50-60% stenosis 2020     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  04/01/2020     Pulmonary nodule 7 mm groundglass opacity in the right upper lobe, unchanged since October 2019 03/19/2020     Atherosclerosis of native arteries of right leg with ulceration of other part of foot ([facility]) 03/03/2020     Symptomatic carotid artery stenosis, left 03/03/2020     Femoral artery stenosis, right ([facility]) 50-75% stenosis in the common femoral artery. 01/14/2020     Mesenteric artery stenosis ([facility]) 01/14/2020     Positive cardiac stress test  small, mildly severe, partially reversible myocardial perfusion defect of anterior and inferior wall  11/12/2019     Abnormal CT of the chest suspicious for an infectious or inflammatory bronchiolitis. 11/07/2019     Celiac artery stenosis ([facility]) >70% stenosis in the celiac trunk 11/05/2019     Aortoiliac stenosis, left ([facility]) 02/25/2019     Spondylosis of lumbar region without myelopathy or radiculopathy 01/29/2019     Lumbosacral spondylosis without myelopathy 01/29/2019     Statin intolerance 01/22/2019     Lumbar disc herniation 01/22/2019     Herniated lumbar intervertebral disc 01/22/2019     Chronic GERD 12/04/2017     Acute renal failure superimposed on stage 3 chronic kidney disease ([facility]) 12/04/2017     Claudication in peripheral vascular disease ([facility]) 12/04/2017     Gout 12/04/2017     Hx-TIA (transient ischemic attack) 12/04/2017     Hyperlipidemia associated with type 2 diabetes mellitus  ([facility]) 12/04/2017     Hypertension associated with diabetes  ([facility]) 12/04/2017     Osteoarthritis 12/04/2017     Right renal artery stenosis (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) 12/19/2015     Hypercholesteremia 12/19/2015     Hypertension 12/19/2015     CVA (cerebral vascular accident) ([facility]) 11/09/2015       LOS (days): 6  Geometric Mean LOS (GMLOS) (days):  3.1  Days to GMLOS:-2.9     OBJECTIVE:  Risk of Unplanned Readmission Score: 46.55         Current admission status: Inpatient   Preferred Pharmacy:   [id number] - [place] - [address]  [address]  [place] [address]  Phone: [phone] Fax: [phone]    Primary Care Provider: Ritchie Lawton MD    Primary Insurance: [facility]  Secondary Insurance:     DISCHARGE DETAILS:    Contacts  Patient Contacts: austin mcfarland (Son)  [phone] (Mobile)  Relationship to Patient:: Family  Contact Method: Phone  Phone Number: [phone]  Reason/Outcome: Emergency Contact, Continuity of Care, Discharge Planning    Treatment Team Recommendation: Short Term Rehab  Discharge Destination Plan:: Short Term Rehab      Additional Comments: Spoke with patient's son Austin briefly for review of choice list before the call was disconnected. CM attempted to reconnect with son. Voicemail message left for son entailing the reason for the call and CM's contact information for return call. [employer] wound vac form started and provided to the orthopedic team for completion. CM department will continue to follow the patient through hospital discharge.

## 2024-07-25 NOTE — PROGRESS NOTES
"Progress Note - Vascular Surgery   Anamaria Parnell 79 y.o. female MRN: [id number]  Unit/Bed#: ICU 08 Encounter: [id number]    Assessment:  79-year-old female POD 2 s/p bilateral common femoral endarterectomies/profundoplasty with bovine pericardial patches, Left JACI/EIA POBA/Sockwave IVL/stenting x 2; Left to right femoral to femoral bypass with B/L VAC placement.    Afebrile, Stable VS on room air   U.O: 645 mL.  VAC: 350 cc serosanguinous  Having flatus  Having bowel movements  DP/PT Doppler signals bilaterally     Plan:  - Continue ASA, brilinta, pravastatin  - Continue IV ABX per primary team  - Will be going for additional right fem to BK pop bypass on 8/2/24, staying inpatient till then  - Podiatry following for local wound care  - Consider DC huffman   - RICARDO Cr 1.92 from 1.59, will plan to start fluids  - DVT ppx  - Pain and nausea control  - Incentive spirometry      Subjective/Objective     Subjective:   No acute events overnight.  Patient reports her pain is well-controlled.  She is using her incentive spirometer to 750.  She denies chest pain, shortness of breath, fevers, chills, numbness, tingling.    Objective:     Blood pressure 110/52, pulse 75, temperature 98 °F (36.7 °C), resp. rate (!) 25, height 4' 10\" (1.473 m), weight 73.3 kg (161 lb 9.6 oz), SpO2 96%.,Body mass index is 33.77 kg/m².      Intake/Output Summary (Last 24 hours) at 7/25/2024 1700  Last data filed at 7/25/2024 1550  Gross per 24 hour   Intake 4030.16 ml   Output 1055 ml   Net 2975.16 ml       Invasive Devices       Peripheral Intravenous Line  Duration             Peripheral IV 07/24/24 Right Forearm 1 day    Peripheral IV 07/25/24 Left Forearm <1 day              Drain  Duration             Urethral Catheter Latex 16 Fr. 1 day                    Physical Exam:   Gen: NAD, Comfortable  Neuro: A&O, No focal deficits  Head: Normal Cephalic, Atraumatic  Eye: EOMI, No scleral icterus  Neck: Midline trachea  CV: RRR, dopplerable PT and DP " signals bilaterally  Pulm: Normal work of breathing, no respiratory distress  Abd: Soft, Non-Distended, Non-Tender   Ext: No edema, Non-tender.  Motor and sensory grossly intact.  Skin: warm, dry. Wound VAC clean, dry, intact.

## 2024-07-25 NOTE — PLAN OF CARE
Problem: PAIN - ADULT  Goal: Verbalizes/displays adequate comfort level or baseline comfort level  Description: Interventions:  - Encourage patient to monitor pain and request assistance  - Assess pain using appropriate pain scale  - Administer analgesics based on type and severity of pain and evaluate response  - Implement non-pharmacological measures as appropriate and evaluate response  - Consider cultural and social influences on pain and pain management  - Notify physician/advanced practitioner if interventions unsuccessful or patient reports new pain  Outcome: Progressing     Problem: INFECTION - ADULT  Goal: Absence or prevention of progression during hospitalization  Description: INTERVENTIONS:  - Assess and monitor for signs and symptoms of infection  - Monitor lab/diagnostic results  - Monitor all insertion sites, i.e. indwelling lines, tubes, and drains  - Monitor endotracheal if appropriate and nasal secretions for changes in amount and color  - [place] appropriate cooling/warming therapies per order  - Administer medications as ordered  - Instruct and encourage patient and family to use good hand hygiene technique  - Identify and instruct in appropriate isolation precautions for identified infection/condition  Outcome: Progressing     Problem: SAFETY ADULT  Goal: Patient will remain free of falls  Description: INTERVENTIONS:  - Educate patient/family on patient safety including physical limitations  - Instruct patient to call for assistance with activity   - Consult OT/PT to assist with strengthening/mobility   - Keep Call bell within reach  - Keep bed low and locked with side rails adjusted as appropriate  - Keep care items and personal belongings within reach  - Initiate and maintain comfort rounds  - Make Fall Risk Sign visible to staff  - Offer Toileting every 2 Hours, in advance of need  - Initiate/Maintain bed alarm  - Apply yellow socks and bracelet for high fall risk patients  - Consider  moving patient to room near nurses station  Outcome: Progressing  Goal: Maintain or return to baseline ADL function  Description: INTERVENTIONS:  -  Assess patient's ability to carry out ADLs; assess patient's baseline for ADL function and identify physical deficits which impact ability to perform ADLs (bathing, care of mouth/teeth, toileting, grooming, dressing, etc.)  - Assess/evaluate cause of self-care deficits   - Assess range of motion  - Assess patient's mobility; develop plan if impaired  - Assess patient's need for assistive devices and provide as appropriate  - Encourage maximum independence but intervene and supervise when necessary  - Involve family in performance of ADLs  - Assess for home care needs following discharge   - Consider OT consult to assist with ADL evaluation and planning for discharge  - Provide patient education as appropriate  Outcome: Progressing  Goal: Maintains/Returns to pre admission functional level  Description: INTERVENTIONS:  - Perform AM-PAC 6 Click Basic Mobility/ Daily Activity assessment daily.  - Set and communicate daily mobility goal to care team and patient/family/caregiver.   - Collaborate with rehabilitation services on mobility goals if consulted  - Stand patient 3 times a day  - Ambulate patient 3 times a day  - Out of bed to chair 3 times a day   - Out of bed for meals 3 times a day  - Out of bed for toileting  - Record patient progress and toleration of activity level   Outcome: Progressing     Problem: DISCHARGE PLANNING  Goal: Discharge to home or other facility with appropriate resources  Description: INTERVENTIONS:  - Identify barriers to discharge w/patient and caregiver  - Arrange for needed discharge resources and transportation as appropriate  - Identify discharge learning needs (meds, wound care, etc.)  - Arrange for interpretive services to assist at discharge as needed  - Refer to Case Management Department for coordinating discharge planning if the  patient needs post-hospital services based on physician/advanced practitioner order or complex needs related to functional status, cognitive ability, or social support system  Outcome: Progressing     Problem: Knowledge Deficit  Goal: Patient/family/caregiver demonstrates understanding of disease process, treatment plan, medications, and discharge instructions  Description: Complete learning assessment and assess knowledge base.  Interventions:  - Provide teaching at level of understanding  - Provide teaching via preferred learning methods  Outcome: Progressing     Problem: CARDIOVASCULAR - ADULT  Goal: Maintains optimal cardiac output and hemodynamic stability  Description: INTERVENTIONS:  - Monitor I/O, vital signs and rhythm  - Monitor for S/S and trends of decreased cardiac output  - Administer and titrate ordered vasoactive medications to optimize hemodynamic stability  - Assess quality of pulses, skin color and temperature  - Assess for signs of decreased coronary artery perfusion  - Instruct patient to report change in severity of symptoms  Outcome: Progressing     Problem: METABOLIC, FLUID AND ELECTROLYTES - ADULT  Goal: Electrolytes maintained within normal limits  Description: INTERVENTIONS:  - Monitor labs and assess patient for signs and symptoms of electrolyte imbalances  - Administer electrolyte replacement as ordered  - Monitor response to electrolyte replacements, including repeat lab results as appropriate  - Instruct patient on fluid and nutrition as appropriate  Outcome: Progressing  Goal: Fluid balance maintained  Description: INTERVENTIONS:  - Monitor labs   - Monitor I/O and WT  - Instruct patient on fluid and nutrition as appropriate  - Assess for signs & symptoms of volume excess or deficit  Outcome: Progressing  Goal: Glucose maintained within target range  Description: INTERVENTIONS:  - Monitor Blood Glucose as ordered  - Assess for signs and symptoms of hyperglycemia and hypoglycemia  -  Administer ordered medications to maintain glucose within target range  - Assess nutritional intake and initiate nutrition service referral as needed  Outcome: Progressing     Problem: HEMATOLOGIC - ADULT  Goal: Maintains hematologic stability  Description: INTERVENTIONS  - Assess for signs and symptoms of bleeding or hemorrhage  - Monitor labs  - Administer supportive blood products/factors as ordered and appropriate  Outcome: Progressing     Problem: MUSCULOSKELETAL - ADULT  Goal: Maintain or return mobility to safest level of function  Description: INTERVENTIONS:  - Assess patient's ability to carry out ADLs; assess patient's baseline for ADL function and identify physical deficits which impact ability to perform ADLs (bathing, care of mouth/teeth, toileting, grooming, dressing, etc.)  - Assess/evaluate cause of self-care deficits   - Assess range of motion  - Assess patient's mobility  - Assess patient's need for assistive devices and provide as appropriate  - Encourage maximum independence but intervene and supervise when necessary  - Involve family in performance of ADLs  - Assess for home care needs following discharge   - Consider OT consult to assist with ADL evaluation and planning for discharge  - Provide patient education as appropriate  Outcome: Progressing  Goal: Maintain proper alignment of affected body part  Description: INTERVENTIONS:  - Support, maintain and protect limb and body alignment  - Provide patient/ family with appropriate education  Outcome: Progressing     Problem: Prexisting or High Potential for Compromised Skin Integrity  Goal: Skin integrity is maintained or improved  Description: INTERVENTIONS:  - Identify patients at risk for skin breakdown  - Assess and monitor skin integrity  - Assess and monitor nutrition and hydration status  - Monitor labs   - Assess for incontinence   - Turn and reposition patient  - Assist with mobility/ambulation  - Relieve pressure over bony  prominences  - Avoid friction and shearing  - Provide appropriate hygiene as needed including keeping skin clean and dry  - Evaluate need for skin moisturizer/barrier cream  - Collaborate with interdisciplinary team   - Patient/family teaching  - Consider wound care consult   Outcome: Progressing

## 2024-07-25 NOTE — PROGRESS NOTES
"Progress Note - Vascular Surgery   Anamaria Parnell 79 y.o. female MRN: [id number]  Unit/Bed#: ICU 08 Encounter: [id number]    Assessment:  79-year-old female with RCL TI and right first hallux gangrene s/p bilateral femoral endarterectomy with left to right femorofemoral bypass and retrograde LIMA stenting as well as bilateral VAC placement on 7/24    Plan:  -Wean to extubate  -Maintain bilateral groin VAC  -Continue Brilinta/ASA/statin  -As needed resuscitation  -Monitor endpoints.  -Monitor I's and O's  -maintain huffman  -Neurovascular checks  -Continue robotics per ID  -Appreciate podiatry for local wound care of hallux  -Timing TBD for right fem to pop bypass  -remain in ICU level of care    Subjective/Objective     Subjective: No acute events overnight.  Patient did receive additional fluid boluses overnight for low urine output.  When sedation was lightened patient follows commands in all 4 extremities.    Objective: Afebrile dynamically stable on S CMV 14/320/6/40%    Blood pressure (!) 156/48, pulse 72, temperature 97.6 °F (36.4 °C), temperature source Oral, resp. rate 16, height 4' 10\" (1.473 m), weight 73.3 kg (161 lb 9.6 oz), SpO2 100%.,Body mass index is 33.77 kg/m².    UOP: 500 cc clear   VAC: 10 cc ss    Invasive Devices       Peripheral Intravenous Line  Duration             Peripheral IV 07/21/24 Distal;Dorsal (posterior);Right Forearm 4 days    Peripheral IV 07/24/24 Right Forearm 1 day    Peripheral IV 07/25/24 Left Forearm <1 day              Arterial Line  Duration             Arterial Line 07/24/24 Right Brachial <1 day              Drain  Duration             NG/OG/Enteral Tube Center mouth <1 day    Urethral Catheter Latex 16 Fr. <1 day              Airway  Duration             ETT  Oral;Cuffed 7.5 mm <1 day                    Physical Exam:  General: No acute distress, alert and oriented  CV: RRR  Lungs: Normal work of breathing  Abdomen: Soft, nondistended, nontender, mild ecchymosis in the " lower pannus, bilateral VAC with good seal and suction SS drainage  Extremities: Dopplerable right DP/PT multiphasic.  Skin: Warm, dry    Lab, Imaging and other studies:  Recent Labs     07/23/24  0444 07/24/24  0456 07/24/24  1939 07/25/24  0436   WBC 12.47*   < > 32.36* 25.83*   HGB 9.9*   < > 11.0* 9.4*      < > 293 310   SODIUM 139   < > 141 139   K 4.1   < > 5.3 4.5      < > 114* 112*   CO2 24   < > 17* 18*   BUN 22   < > 25 30*   CREATININE 1.43*   < > 1.41* 1.59*   GLUC 135   < > 228* 182*   CALCIUM 8.4   < > 8.8 8.2*   MG  --   --  1.6*  --    PHOS  --   --  6.4*  --    AST 12*  --  15  --    ALT <3*  --  3*  --    ALKPHOS 108*  --  74  --    TBILI 0.34  --  0.48  --     < > = values in this interval not displayed.

## 2024-07-25 NOTE — ASSESSMENT & PLAN NOTE
-cardiology following  -echo 7/21- ef 41%, severe LAE, mod MR, mild TR   -restart po meds when extubated

## 2024-07-25 NOTE — RESPIRATORY THERAPY NOTE
07/25/24 0750   Vent Information   Vent ID [id number]   Vent type Love G5   Love Vent Mode (S)CMV   $ Pulse Oximetry Spot Check Charge Completed   SpO2 100 %   (S)CMV Settings   Resp Rate (BPM) 14 BPM   VT (mL) 320 mL   FIO2 (%) 40 %   PEEP (cmH2O) 6 cmH2O   I:E Ratio 1:3.3   Insp Time (%) 1 %   Flow Trigger (LPM) 2   Humidification Heater   Heater Temperature (Set) 98.6 °F (37 °C)   (S)CMV Actuals   Resp Rate (BPM) 14 BPM   VT (mL) 318   MV 4.5   MAP (cmH2O) 7.8 cmH2O   Peak Pressure (cmH2O) 15 cmH2O   I:E Ratio (Obs) 1:3.3   Insp Resistance 13   Heater Temperature (Obs) 98.6 °F (37 °C)   ETCO2 (mmHg) 27 mmHg   $ ETCO2  Yes   Static Compliance (mL/cmH20) 37.5 mL/cmH2O   Plateau Pressure (cm H2O) 12.6 cm H2O   (S)CMV Alarms   High Peak Pressure (cmH2O) 40   Low Pressure (cmH2O) 5 cm H2O   High Resp Rate (BPM) 40 BPM   Low Resp Rate (BPM) 5 BPM   High MV (L/min) 20 L/min   Low MV (L/min) 3 L/min   High VT (mL) 1000 mL   Low VT (mL) 200 mL   Apnea Time (s) 20 S   Maintenance   Alarm (pink) cable attached No   Resuscitation bag with peep valve at bedside Yes   Water bag changed No   Circuit changed No   IHI Ventilator Associated Pneumonia Bundle   Daily Assessment of Readiness to Extubate Yes   ETT  Oral;Cuffed 7.5 mm   Placement Date/Time: 07/24/24 0828   Mask Ventilation: Ventilated by mask with oral airway (2)  Preoxygenated: Yes  Technique: Direct laryngoscopy;Stylet  Type: Oral;Cuffed  Tube Size: 7.5 mm  Laryngoscope: Mac  Blade Size: 3  Location: Oral  Grade Vi...   Secured at (cm) 31   Measured from Lips   Secured Location Center   Repositioned Right to Center   Secured by [employer] tube lange   Site Condition Dry   Cuff Pressure (cm H2O)   (MLT)     RT Ventilator Management Note  Anamaria Parnell 79 y.o. female MRN: [id number]  Unit/Bed#: ICU 08 Encounter: [id number]      Daily Screen         7/24/2024  6526             Patient safety screen outcome:: Failed    Not Ready for Weaning due to:: Underline  problem not resolved              Physical Exam:   Assessment Type: Assess only  General Appearance: Sleeping  Respiratory Pattern: Assisted  Chest Assessment: Chest expansion symmetrical

## 2024-07-25 NOTE — PLAN OF CARE
Problem: PAIN - ADULT  Goal: Verbalizes/displays adequate comfort level or baseline comfort level  Description: Interventions:  - Encourage patient to monitor pain and request assistance  - Assess pain using appropriate pain scale  - Administer analgesics based on type and severity of pain and evaluate response  - Implement non-pharmacological measures as appropriate and evaluate response  - Consider cultural and social influences on pain and pain management  - Notify physician/advanced practitioner if interventions unsuccessful or patient reports new pain  Outcome: Progressing     Problem: INFECTION - ADULT  Goal: Absence or prevention of progression during hospitalization  Description: INTERVENTIONS:  - Assess and monitor for signs and symptoms of infection  - Monitor lab/diagnostic results  - Monitor all insertion sites, i.e. indwelling lines, tubes, and drains  - Monitor endotracheal if appropriate and nasal secretions for changes in amount and color  - [place] appropriate cooling/warming therapies per order  - Administer medications as ordered  - Instruct and encourage patient and family to use good hand hygiene technique  - Identify and instruct in appropriate isolation precautions for identified infection/condition  Outcome: Progressing     Problem: SAFETY ADULT  Goal: Patient will remain free of falls  Description: INTERVENTIONS:  - Educate patient/family on patient safety including physical limitations  - Instruct patient to call for assistance with activity   - Consult OT/PT to assist with strengthening/mobility   - Keep Call bell within reach  - Keep bed low and locked with side rails adjusted as appropriate  - Keep care items and personal belongings within reach  - Initiate and maintain comfort rounds  - Make Fall Risk Sign visible to staff  - Offer Toileting every 2 Hours, in advance of need  - Initiate/Maintain bed alarm  - Apply yellow socks and bracelet for high fall risk patients  - Consider  moving patient to room near nurses station  Outcome: Progressing  Goal: Maintain or return to baseline ADL function  Description: INTERVENTIONS:  -  Assess patient's ability to carry out ADLs; assess patient's baseline for ADL function and identify physical deficits which impact ability to perform ADLs (bathing, care of mouth/teeth, toileting, grooming, dressing, etc.)  - Assess/evaluate cause of self-care deficits   - Assess range of motion  - Assess patient's mobility; develop plan if impaired  - Assess patient's need for assistive devices and provide as appropriate  - Encourage maximum independence but intervene and supervise when necessary  - Involve family in performance of ADLs  - Assess for home care needs following discharge   - Consider OT consult to assist with ADL evaluation and planning for discharge  - Provide patient education as appropriate  Outcome: Progressing  Goal: Maintains/Returns to pre admission functional level  Description: INTERVENTIONS:  - Perform AM-PAC 6 Click Basic Mobility/ Daily Activity assessment daily.  - Set and communicate daily mobility goal to care team and patient/family/caregiver.   - Collaborate with rehabilitation services on mobility goals if consulted  - Stand patient 3 times a day  - Ambulate patient 3 times a day  - Out of bed to chair 3 times a day   - Out of bed for meals 3 times a day  - Out of bed for toileting  - Record patient progress and toleration of activity level   Outcome: Progressing     Problem: DISCHARGE PLANNING  Goal: Discharge to home or other facility with appropriate resources  Description: INTERVENTIONS:  - Identify barriers to discharge w/patient and caregiver  - Arrange for needed discharge resources and transportation as appropriate  - Identify discharge learning needs (meds, wound care, etc.)  - Arrange for interpretive services to assist at discharge as needed  - Refer to Case Management Department for coordinating discharge planning if the  patient needs post-hospital services based on physician/advanced practitioner order or complex needs related to functional status, cognitive ability, or social support system  Outcome: Progressing     Problem: Knowledge Deficit  Goal: Patient/family/caregiver demonstrates understanding of disease process, treatment plan, medications, and discharge instructions  Description: Complete learning assessment and assess knowledge base.  Interventions:  - Provide teaching at level of understanding  - Provide teaching via preferred learning methods  Outcome: Progressing     Problem: CARDIOVASCULAR - ADULT  Goal: Maintains optimal cardiac output and hemodynamic stability  Description: INTERVENTIONS:  - Monitor I/O, vital signs and rhythm  - Monitor for S/S and trends of decreased cardiac output  - Administer and titrate ordered vasoactive medications to optimize hemodynamic stability  - Assess quality of pulses, skin color and temperature  - Assess for signs of decreased coronary artery perfusion  - Instruct patient to report change in severity of symptoms  Outcome: Progressing     Problem: METABOLIC, FLUID AND ELECTROLYTES - ADULT  Goal: Electrolytes maintained within normal limits  Description: INTERVENTIONS:  - Monitor labs and assess patient for signs and symptoms of electrolyte imbalances  - Administer electrolyte replacement as ordered  - Monitor response to electrolyte replacements, including repeat lab results as appropriate  - Instruct patient on fluid and nutrition as appropriate  Outcome: Progressing  Goal: Fluid balance maintained  Description: INTERVENTIONS:  - Monitor labs   - Monitor I/O and WT  - Instruct patient on fluid and nutrition as appropriate  - Assess for signs & symptoms of volume excess or deficit  Outcome: Progressing  Goal: Glucose maintained within target range  Description: INTERVENTIONS:  - Monitor Blood Glucose as ordered  - Assess for signs and symptoms of hyperglycemia and hypoglycemia  -  Administer ordered medications to maintain glucose within target range  - Assess nutritional intake and initiate nutrition service referral as needed  Outcome: Progressing     Problem: HEMATOLOGIC - ADULT  Goal: Maintains hematologic stability  Description: INTERVENTIONS  - Assess for signs and symptoms of bleeding or hemorrhage  - Monitor labs  - Administer supportive blood products/factors as ordered and appropriate  Outcome: Progressing     Problem: MUSCULOSKELETAL - ADULT  Goal: Maintain or return mobility to safest level of function  Description: INTERVENTIONS:  - Assess patient's ability to carry out ADLs; assess patient's baseline for ADL function and identify physical deficits which impact ability to perform ADLs (bathing, care of mouth/teeth, toileting, grooming, dressing, etc.)  - Assess/evaluate cause of self-care deficits   - Assess range of motion  - Assess patient's mobility  - Assess patient's need for assistive devices and provide as appropriate  - Encourage maximum independence but intervene and supervise when necessary  - Involve family in performance of ADLs  - Assess for home care needs following discharge   - Consider OT consult to assist with ADL evaluation and planning for discharge  - Provide patient education as appropriate  Outcome: Progressing  Goal: Maintain proper alignment of affected body part  Description: INTERVENTIONS:  - Support, maintain and protect limb and body alignment  - Provide patient/ family with appropriate education  Outcome: Progressing     Problem: Prexisting or High Potential for Compromised Skin Integrity  Goal: Skin integrity is maintained or improved  Description: INTERVENTIONS:  - Identify patients at risk for skin breakdown  - Assess and monitor skin integrity  - Assess and monitor nutrition and hydration status  - Monitor labs   - Assess for incontinence   - Turn and reposition patient  - Assist with mobility/ambulation  - Relieve pressure over bony  prominences  - Avoid friction and shearing  - Provide appropriate hygiene as needed including keeping skin clean and dry  - Evaluate need for skin moisturizer/barrier cream  - Collaborate with interdisciplinary team   - Patient/family teaching  - Consider wound care consult   Outcome: Progressing

## 2024-07-25 NOTE — QUICK NOTE
Post Op Check:    Patient arrived to the ICU intubated and sedated. She remains intubated and is undergoing further resuscitation. She follows commands in all four extremities.     Temp:  [97.7 °F (36.5 °C)-98.3 °F (36.8 °C)] 97.7 °F (36.5 °C)  HR:  [81-97] 94  Resp:  [13-22] 13  BP: (112-218)/() 169/75  Arterial Line BP: (172-233)/() 172/77  FiO2 (%):  [100] 100      Physical Exam:  General: No acute distress, sedated.  CV: Well perfused, regular rate  Lungs: Normal work of breathing, SCMV 320/6/80%  Abdomen: Soft, non-tender, non-distended, bilateral fem wound vacs with good seal and suction with good seal and suction.   Extremities: Multiphasic DP doppler to RLE, RLE wrapped with kerlix, foul smelling dry gangrene of R 1st hallux  Skin: Warm, dry      Plan:  NPO/OGT  Cont ETT, fent/prop gtt  Cont Garrison  Cont neurovasc checks  Cont ASA/brillinta/statin  Cont ancef per ID  Cont local wound care for dry gangrene per podiatry recs  F/u post op labs    Vignesh Hunt MD  PGY-2   07/24/24

## 2024-07-25 NOTE — OCCUPATIONAL THERAPY NOTE
Occupational Therapy Re-Evaluation     Patient Name: Anamaria Parnell  Today's Date: 7/25/2024  Problem List  Principal Problem:    Gangrene ([facility])  Active Problems:    Basilar artery stenosis    CVA (cerebral vascular accident) ([facility])    Stage 4 chronic kidney disease ([facility])    Depression, recurrent (HCC)    Type 2 diabetes mellitus, without long-term current use of insulin (HCC)    Aortoiliac occlusive disease ([facility])    Primary hypertension    Cellulitis of toe of right foot    Coronary artery disease of native artery of native heart with stable angina pectoris ([facility])    Past Medical History  Past Medical History:   Diagnosis Date    Aphasia as late effect of cerebrovascular accident (CVA) 08/16/2023    Arthritis     Chronic kidney disease     Diabetes mellitus ([facility])     Embolism and 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 ([facility]) 01/20/2021    Endometriosis     High cholesterol     History of left common carotid artery stent placement 10/27/2023    Hypertension     Kidney disease, chronic, stage III (moderate, EGFR 30-59 ml/min) ([facility])     Lumbar disc herniation     Median arcuate ligament syndrome ([facility]) 11/05/2019    Neuropathy     Obesity (BMI 30-39.9) 12/04/2017    Obesity, morbid ([facility]) 01/20/2021    Peripheral vascular disease ([facility])     Pneumonia     Shoulder injury     left    Spinal stenosis     Stroke ([facility]) 2015    Memory loss     Past Surgical History  Past Surgical History:   Procedure Laterality Date    ARTERIOGRAM Bilateral 7/24/2024    Procedure: ARTERIOGRAM;  Surgeon: Jose Crum DO;  Location: [facility];  Service: Vascular    CARDIAC CATHETERIZATION      CAROTID STENT Left 9/7/2023    Procedure: L TCAR;  Surgeon: Nicole Gallo MD;  [facility];  Service: Vascular    COLONOSCOPY      FL RETROGRADE PYELOGRAM  4/6/2020    FRACTURE SURGERY Right     ankle    IR CAROTID STENT  9/7/2023    IR LOWER EXTREMITY ANGIOGRAM  7/24/2024    OVARIAN CYST SURGERY      DC CYSTO BLADDER  W/URETERAL CATHETERIZATION Bilateral 4/6/2020    Procedure: CYSTOSCOPY WITH RETROGRADE PYELOGRAM;  Surgeon: Robert Mitchell MD;  [facility];  Service: Urology    AK CYSTO W/INSERT URETERAL STENT Right 4/6/2020    Procedure: INSERTION STENT URETERAL;  Surgeon: Robert Mitchell MD;  [facility];  Service: Urology    AK CYSTO W/REMOVAL OF TUMORS SMALL N/A 4/6/2020    Procedure: TRANSURETHRAL RESECTION OF BLADDER TUMOR (TURBT);  Surgeon: Robert Mitchell MD;  [facility];  Service: Urology    AK TEAEC W/WO PATCH GRAFT COMMON FEMORAL Bilateral 7/24/2024    Procedure: Bilateral femoral endarterectomies with patch angioplasty; Retrograde iliac intervention, shockwave, stent placement and angioplasty; femoral to femoral bypass with PTFE;  Surgeon: Jose Crum DO;  [facility];  Service: Vascular    ROTATOR CUFF REPAIR Left     TONSILLECTOMY             07/25/24 1433   Note Type   Note type Re-Evaluation   Pain Assessment   Pain Assessment Tool 0-10   Pain Score No Pain   Pain Rating: FLACC (Rest) - Face 0   Pain Rating: FLACC (Rest) - Legs 0   Pain Rating: FLACC (Rest) - Activity 0   Pain Rating: FLACC (Rest) - Cry 1   Pain Rating: FLACC (Rest) - Consolability 0   Score: FLACC (Rest) 1   Restrictions/Precautions   Weight Bearing Precautions Per Order Yes   RLE Weight Bearing Per Order WBAT   Other Precautions Cognitive;Chair Alarm;Bed Alarm;Fall Risk;Pain;Telemetry;Multiple lines  (wound VAC)   Home Living   Type of Home House   Home Layout One level;Ramped entrance   Bathroom Shower/Tub Walk-in shower   Bathroom Toilet Standard   Bathroom Equipment Shower chair;Commode   Home Equipment Walker;Cane;Wheelchair-electric   Prior Function   Lives With Son   Lifestyle   Autonomy PTA pt states independence with her ADLs, transfers--i.e.stand-pivot, limited ambulation; states limited ambulation last 1-2months; sleeps in lift chair, neg falls; neg , neg home alone   Reciprocal  "Relationships 1 son   Service to Others homemaker   Intrinsic Gratification watching TV   Subjective   Subjective \"I feel good.\"   ADL   Where Assessed Edge of bed   Eating Assistance 5  Supervision/Setup   Grooming Assistance 5  Supervision/Setup   UB Bathing Assistance 5  Supervision/Setup   LB Bathing Assistance 3  Moderate Assistance   UB Dressing Assistance 5  Supervision/Setup   LB Dressing Assistance 2  Maximal Assistance   Toileting Assistance  1  Total Assistance   Bed Mobility   Rolling R 3  Moderate assistance   Additional items Assist x 1;Increased time required;Verbal cues;LE management   Rolling L 3  Moderate assistance   Additional items Assist x 1;Increased time required;Verbal cues;LE management   Supine to Sit 2  Maximal assistance   Additional items Assist x 1;Increased time required;Verbal cues;LE management   Transfers   Sit to Stand 3  Moderate assistance   Additional items Assist x 1;Increased time required;Verbal cues   Stand to Sit 3  Moderate assistance   Additional items Assist x 1;Increased time required;Verbal cues   Additional Comments bp's=107/49(supine), 107/78(EOB), 104/55(sitting in chair); SPO2=97% on RA, HR=78bpm   Functional Mobility   Functional Mobility 3  Moderate assistance   Additional Comments x1   Additional items Rolling walker   Balance   Static Sitting Fair +   Dynamic Sitting Fair   Static Standing Poor +   Dynamic Standing Poor   Activity Tolerance   Activity Tolerance Patient limited by fatigue;Patient limited by pain   Medical Staff Made Aware nsg   RUE Assessment   RUE Assessment WFL   RUE Strength   RUE Overall Strength Within Functional Limits - able to perform ADL tasks with strength  (4/5 throughout)   LUE Assessment   LUE Assessment WFL   LUE Strength   LUE Overall Strength Within Functional Limits - able to perform ADL tasks with strength  (4/5 throughout)   Hand Function   Gross Motor Coordination Functional   Fine Motor Coordination Functional   Sensation " "  Light Touch No apparent deficits   Proprioception   Proprioception No apparent deficits   Vision-Basic Assessment   Current Vision Wears glasses all the time   Vision - Complex Assessment   Acuity   (impaired)   Psychosocial   Psychosocial (WDL) WDL   Perception   Inattention/Neglect Appears intact   Cognition   Overall Cognitive Status WFL   Arousal/Participation Alert   Attention Attends with cues to redirect   Orientation Level Oriented X4   Memory Decreased short term memory;Decreased recall of precautions   Following Commands Follows one step commands with increased time or repetition   Assessment   Limitation Decreased ADL status;Decreased UE strength;Decreased Safe judgement during ADL;Decreased endurance;Decreased high-level ADLs   Prognosis Fair   Assessment Pt is a 78y/o female initially evaluated on 7/22 2* PAD. Since then, pt had a s/p Bilateral femoral endarterectomies with patch angioplasty; Retrograde iliac intervention, shockwave, stent placement and angioplasty; femoral to femoral bypass with PTFE (Bilateral: Leg Lower), ARTERIOGRAM, VAC placement(7/24). Pt is currently allowed WBAT with her R LE. During re-evaluation, pt demonstrated deficits with her functional balance, functional mobility, ADL status, transfer safety, b/l UE strength, activity tolerance(currently fair=15-20mins), and questionable cognition(i.e.problem-solving). Pt continues to demonstrate appropriateness for OT tx for the above deficits. Goals stated on initial eval remain appropriate with additional goal below. See for details. 3-5xwk/1-2wks. The patient's raw score on the AM-PAC Daily Activity Inpatient Short Form is 15. A raw score of less than 19 suggests the patient may benefit from discharge to post-acute rehabilitation services. Please refer to the recommendation of the Occupational Therapist for safe discharge planning.   Goals   Patient Goals \"to go home\"   STG Time Frame   (1-7)   Short Term Goal #1 Pt will tolerate " continued cognitive/home-safety assessment and appropriate d/c recommendations will be provided.   Plan   Treatment Interventions ADL retraining;UE strengthening/ROM;Functional transfer training;Endurance training;Cognitive reorientation;Patient/family training;Equipment evaluation/education;Continued evaluation;Compensatory technique education   Goal Expiration Date 08/08/24   OT Treatment Day 1   OT Frequency 3-5x/wk   Discharge Recommendation   Rehab Resource Intensity Level, OT II (Moderate Resource Intensity)   AM-PAC Daily Activity Inpatient   Lower Body Dressing 2   Bathing 2   Toileting 1   Upper Body Dressing 3   Grooming 3   Eating 4   Daily Activity Raw Score 15   Daily Activity Standardized Score (Calc for Raw Score >=11) 34.69   AM-PAC Applied Cognition Inpatient   Following a Speech/Presentation 3   Understanding Ordinary Conversation 3   Taking Medications 2   Remembering Where Things Are Placed or Put Away 3   Remembering List of 4-5 Errands 3   Taking Care of Complicated Tasks 3   Applied Cognition Raw Score 17   Applied Cognition Standardized Score 36.52     Job Alba

## 2024-07-25 NOTE — DISCHARGE INSTR - AVS FIRST PAGE
DISCHARGE INSTRUCTIONS  NEGATIVE PRESSURE WOUND THERAPY/WOUND VAC    ACTIVITY:   Limit your physical activity with the limb with the Wound VAC therapy.  Walking up steps and normal activities may be resumed as you feel ready.  Avoid strenuous activity such as vigorous exercise.  Avoid heavy lifting (do not lift more than 15 pounds) while you have the Wound VAC in place.  You should not drive a car while you have the Wound VAC in place.  Your provider will instruct you when it is safe to drive.  You may ride in a car.    DIET:  Resume your normal diet.  Good nutrition is important for healing of your incision.  If you are discharged on narcotics for pain control, continue taking your stool softeners until you are having regular bowel movements.    INCISION:  Wound VAC will stay in place 24 hours a day.  A licensed professional (Home Health Care Worker) will come to your home a few times a week to change the bandage and check the machine.  You may need to have the bandage/sponge changed more often if there is a lot of drainage.  You may NOT shower or bathe while wound VAC is in place.  Do NOT remove dressing unless your provider instructs you to.  Change wound vac three times per week on Monday, Wednesday and Friday. Maintain wound vac on continuous suction at 125mmHg.    DO NOT put any powders, creams, ointments, or lotions around the dressing of the Wound VAC.    SWELLING: Most patients have noticeable swelling after leg surgery. This usually improves within a few weeks. If swelling is present, elevate the affected limb whenever possible.     FOLLOW UP APPOINTMENTS:  Making and keeping follow up appointments and ultrasound tests are important to your recovery.  If you have difficulty making it to or keeping your follow up appointments, call the office.    If you have increased pain, fever >101.5, nausea, vomiting, increased drainage, redness, warmth, swelling, change in color of drainage/pus, or a bad smell at your  Wound VAC site, new coldness/numbness of your arm or leg, or become dizzy or confused please call us immediately and GO directly to the ER.    PLEASE CALL THE OFFICE IF YOU HAVE ANY QUESTIONS  [phone]  [phone] [address], [address], [place] [address]  [address] [place] [address]  [address] [facility], [address], [place] [address]  [address] [facility], [place] [address]  [address] [facility], [address], [place] [address]  [address] [facility], [address], [place] [address]  [address] [place] [address]  [address] [facility], [address]st [address], [address], [place] [address]  [address], [facility], [place] [address]  [address] [facility], [address], [place] [address]

## 2024-07-25 NOTE — CONSULTS
[facility]  Consult  Name: Anamaria Parnell 79 y.o. female I MRN: [id number]  Unit/Bed#: ICU 08 I Date of Admission: 7/19/2024   Date of Service: 7/24/2024 I Hospital Day: 5    Inpatient consult to Medical Critical Care  Consult performed by: Hannah Baxter PA-C  Consult ordered by: Harvey Rey DO          Assessment & Plan   Coronary artery disease of native artery of native heart with stable angina pectoris (HCC)  Assessment & Plan  -cardiology following  -echo 7/21- ef 41%, severe LAE, mod MR, mild TR    Primary hypertension  Assessment & Plan  -stable at this time  -hold po meds  -will give lopressor IV if needed o/n    Aortoiliac occlusive disease (HCC)  Assessment & Plan  -prolonged surgery today  -arrived to ICU post-op  -will continue ett and pain control t/o night  -Pt had--Bilateral  common femoral endarterectomy/profundoplasty with bovine pericardial patch plasty  Abdominal pelvic angiography/bilateral iliofemoral angiography, right lower extremity completion arteriogram  Left common/External iliac standard balloon angioplasty  Left common/external iliac shockwave lithotripsy  Left common iliac VBX stent placement  Left external iliac drug-coated stent placement  Left to right 8 mm ringed PTFE femoral-femoral bypass  Bilateral groin subcutaneous groin VAC placement  -ebl 1500-2L  -transfused 3u prbc and 1ffp  -4L IVF given  -cont NV checks  -cont brachial homa  -wean fio2  -recheck labs  -pain control  -wound vacs in place    Type 2 diabetes mellitus, without long-term current use of insulin (HCC)  Assessment & Plan  Lab Results   Component Value Date    HGBA1C 5.9 (H) 07/09/2024       Recent Labs     07/23/24  1613 07/23/24  2103 07/24/24  0734 07/24/24  2138   POCGLU 159* 148* 138 189*       Blood Sugar Average: Last 72 hrs:  (P) [id number]  -ssi  -accuchecks    Stage 4 chronic kidney disease (HCC)  Assessment & Plan  Lab Results   Component Value Date     EGFR 35 07/24/2024    EGFR 34 07/24/2024    EGFR 35 07/23/2024    CREATININE 1.41 (H) 07/24/2024    CREATININE 1.46 (H) 07/24/2024    CREATININE 1.43 (H) 07/23/2024   -monitor renal fxn  -avoid nephrotoxins  -baseline creat 1.4-1.7  -was on bicarb tabs for metabolic acidosis   -will continue when takes po  -will give iv bicarb if needed    CVA (cerebral vascular accident) ([facility])  Assessment & Plan  7/28/2023 for strokelike symptoms.  At that time MRI showed left PCA and left MCA strokes   -restart brulinta and asa when cleared by vascular and taking po    * Gangrene ([facility])  Assessment & Plan  -podiatry and vascular following           History of Present Illness     HPI: Anamaria Parnell is a 79 y.o. who presents with severe foot pain and right toe pain. Pt has been following with podiatry weekly. Pt was evaluated by vascular for severe diffuse arterial disease and taken to OR for initial plan for RLE procedure however due to calcifications and severe disease the case was extended and she ended up having Bilateral  common femoral endarterectomy/profundoplasty with bovine pericardial patch plasty  Abdominal pelvic angiography/bilateral iliofemoral angiography, right lower extremity completion arteriogram  Left common/External iliac standard balloon angioplasty  Left common/external iliac shockwave lithotripsy  Left common iliac VBX stent placement  Left external iliac drug-coated stent placement  Left to right 8 mm ringed PTFE femoral-femoral bypass  Bilateral groin subcutaneous groin VAC placement.  Pt arrived in ICU intubated and sedated. Will cont pain control tonight and monitor for acute changes t/o night. Plan to extubate in am.     History obtained from chart review.  Review of Systems: Review of Systems   Unable to perform ROS: Intubated     Disposition: Critical care   Historical Information   Past Medical History:  08/16/2023: Aphasia as late effect of cerebrovascular accident (CVA)  No date: Arthritis  No  date: Chronic kidney disease  No date: Diabetes mellitus ([facility])  01/20/2021: Embolism and thrombosis of arteries of the lower   extremities ([facility])  No date: Endometriosis  No date: High cholesterol  10/27/2023: History of left common carotid artery stent placement  No date: Hypertension  No date: Kidney disease, chronic, stage III (moderate, EGFR 30-59 ml/  min) ([facility])  No date: Lumbar disc herniation  11/05/2019: Median arcuate ligament syndrome ([facility])  No date: Neuropathy  12/04/2017: Obesity (BMI 30-39.9)  01/20/2021: Obesity, morbid ([facility])  No date: Peripheral vascular disease ([facility])  No date: Pneumonia  No date: Shoulder injury      Comment:  left  No date: Spinal stenosis  2015: Stroke ([facility])      Comment:  Memory loss Past Surgical History:  No date: CARDIAC CATHETERIZATION  9/7/2023: CAROTID STENT; Left      Comment:  Procedure: L TCAR;  Surgeon: Nicole Gallo MD;                 Location: [facility];  Service: Vascular  No date: COLONOSCOPY  4/6/2020: FL RETROGRADE PYELOGRAM  No date: FRACTURE SURGERY; Right      Comment:  ankle  9/7/2023: IR CAROTID STENT  7/24/2024: IR LOWER EXTREMITY ANGIOGRAM  No date: OVARIAN CYST SURGERY  4/6/2020: IA CYSTO BLADDER W/URETERAL CATHETERIZATION; Bilateral      Comment:  Procedure: CYSTOSCOPY WITH RETROGRADE PYELOGRAM;                 Surgeon: Robert Mitchell MD;  Location: [facility];                 Service: Urology  4/6/2020: IA CYSTO W/INSERT URETERAL STENT; Right      Comment:  Procedure: INSERTION STENT URETERAL;  Surgeon: Robert Mitchell MD;  Location: [facility];  Service: Urology  4/6/2020: IA CYSTO W/REMOVAL OF TUMORS SMALL; N/A      Comment:  Procedure: TRANSURETHRAL RESECTION OF BLADDER TUMOR                (TURBT);  Surgeon: Robert Mitchell MD;  Location: [facility];  Service: Urology  No date: ROTATOR CUFF REPAIR; Left  No date: TONSILLECTOMY   Current Outpatient Medications   Medication Instructions    acetaminophen  (TYLENOL) 650 mg, Oral, Every 6 hours PRN    allopurinol (ZYLOPRIM) 100 mg tablet TAKE 1 TABLET BY MOUTH EVERY DAY    amLODIPine (NORVASC) 5 mg, Oral, Daily, Primary hypertension [I10]    aspirin 81 mg, Oral, Daily    B-D ULTRAFINE III SHORT PEN 31G X 8 MM MISC 2 times daily    Blood Glucose Monitoring Suppl (OneTouch Verio Reflect) w/Device KIT Check blood sugars three times daily. Please substitute with appropriate alternative as covered by patient's insurance. Dx: E11.65    cinacalcet (SENSIPAR) 30 mg, Oral, Every other day    cloNIDine (CATAPRES-TTS-3) 0.3 mg, Transdermal, Weekly    collagenase (SANTYL) ointment Topical, Daily    Diclofenac Sodium (VOLTAREN) 2 g, Topical, 4 times daily    escitalopram (LEXAPRO) 20 mg, Oral, Daily    Glucagon 1 mg, Subcutaneous, As needed    glucose blood (OneTouch Verio) test strip Check blood sugars three times daily. Please substitute with appropriate alternative as covered by patient's insurance. Dx: E11.65    labetalol (NORMODYNE) 300 mg, Oral, Every 12 hours scheduled    levofloxacin (LEVAQUIN) 500 mg, Oral, Every 24 hours, 14 day dose    lidocaine (LIDODERM) 5 % 1 patch, Daily    losartan (COZAAR) 100 mg, Oral, Daily    OneTouch Delica Lancets 33G MISC Check blood sugars three times daily. Please substitute with appropriate alternative as covered by patient's insurance. Dx: E11.65    pravastatin (PRAVACHOL) 80 mg, Oral, Daily with dinner    ticagrelor (BRILINTA) 90 mg, Oral, Every 12 hours scheduled    Tradjenta 5 mg, Oral, Daily    traZODone (DESYREL) 50 mg, Oral, Daily at bedtime    Allergies   Allergen Reactions    Fenofibrate Other (See Comments)      blood in urine  hx  Kidney Failure    Colesevelam Other (See Comments)      leg pains    Colestipol Itching and Other (See Comments)      Swelling lower legs    Ezetimibe GI Intolerance    Statins Myalgia      Social History     Tobacco Use    Smoking status: Former     Current packs/day: 0.00     Average packs/day: 2.0  packs/day for 54.6 years (109.1 ttl pk-yrs)     Types: Cigarettes     Start date: 1/1/1966     Quit date: 7/27/2020     Years since quitting: 3.9    Smokeless tobacco: Never   Vaping Use    Vaping status: Never Used   Substance Use Topics    Alcohol use: Never    Drug use: Never    Family History   Problem Relation Age of Onset    Hypertension Mother     Heart disease Mother         Valvular    Hyperlipidemia Mother     Hypertension Father     Heart defect Father         Cardiomegaly    Stroke Sister         Cerebrovascular Accident    Arthritis Brother     Other Brother         Back Disorder        Objective                            Vitals I/O      Most Recent Min/Max in 24hrs   Temp 97.7 °F (36.5 °C) Temp  Min: 97.7 °F (36.5 °C)  Max: 98.3 °F (36.8 °C)   Pulse 88 Pulse  Min: 81  Max: 97   Resp 22 Resp  Min: 21  Max: 22   BP (!) 218/108 BP  Min: 138/64  Max: 218/108   O2 Sat 99 % SpO2  Min: 94 %  Max: 100 %      Intake/Output Summary (Last 24 hours) at 7/24/2024 2235  Last data filed at 7/24/2024 2141  Gross per 24 hour   Intake 5600 ml   Output 2510 ml   Net 3090 ml       Diet Kaveh/CHO Controlled; Consistent Carbohydrate Diet Level 2 (5 carb servings/75 grams CHO/meal)    Invasive Monitoring   Arterial Line  [place] /77  Arterial Line BP  Min: 172/77  Max: 233/111    mmHg  Arterial Line MAP (mmHg)  Min: 110 mmHg  Max: 162 mmHg           Physical Exam   Physical Exam  Vitals and nursing note reviewed.   Eyes:      Pupils: Pupils are equal, round, and reactive to light.   Skin:     General: Skin is warm and dry.   HENT:      Head: Normocephalic and atraumatic.   Cardiovascular:      Rate and Rhythm: Normal rate and regular rhythm.      Pulses: Pulses are Doppler pulses.      Heart sounds: Normal heart sounds.   Musculoskeletal:         General: Normal range of motion.      Comments: Ayala to command though weak 2/2 sedation   Abdominal:      Palpations: Abdomen is soft.      Comments: Pelvic and groin  wound vac in place   Constitutional:       Appearance: She is well-developed and well-nourished.   Pulmonary:      Breath sounds: Normal breath sounds.      Comments: Ett in place  Neurological:      General: No focal deficit present.      Mental Status: She is somnolent.   Genitourinary/Anorectal:  [place] present.          Diagnostic Studies      EKG: nsr on tele  Imaging:  I have personally reviewed pertinent reports.   and I have personally reviewed pertinent films in PACS     Medications:  Scheduled PRN   acetaminophen, 975 mg, Q8H JAVIER  allopurinol, 100 mg, Daily  amLODIPine, 5 mg, Daily  aspirin, 81 mg, Daily  cefazolin, 1,000 mg, Q12H  chlorhexidine, 15 mL, Q12H JAVIER  cloNIDine, 1 patch, Weekly  escitalopram, 20 mg, Daily  heparin (porcine), 5,000 Units, Q8H JAVIER  insulin lispro, 1-5 Units, TID AC  lidocaine, 1 patch, Daily  magnesium sulfate, 2 g, Once  metoprolol succinate, 100 mg, Daily  pravastatin, 80 mg, Daily With Dinner  sodium bicarbonate, 650 mg, BID after meals  ticagrelor, 90 mg, Q12H JAVIER  traZODone, 50 mg, HS      fentaNYL, 50 mcg, Q5 Min PRN  HYDROmorphone, 0.5 mg, Q5 Min PRN  HYDROmorphone, 0.2 mg, Q6H PRN  lactated ringers, 500 mL, Once PRN   And  lactated ringers, 500 mL, Once PRN  LORazepam, 0.5 mg, Q8H PRN  ondansetron, 4 mg, Q6H PRN  ondansetron, 4 mg, Once PRN  oxyCODONE, 5 mg, Q6H PRN  sodium chloride, 500 mL, Once PRN   And  sodium chloride, 500 mL, Once PRN       Continuous    fentaNYL, 25 mcg/hr, Last Rate: 25 mcg/hr (07/24/24 1911)  lactated ringers, 75 mL/hr, Last Rate: 75 mL/hr (07/24/24 1907)  propofol, 5-50 mcg/kg/min, Last Rate: 10 mcg/kg/min (07/24/24 1914)         Labs:    CBC    Recent Labs     07/24/24  0456 07/24/24  0901 07/24/24  1551 07/24/24  1939   WBC 13.51*  --   --  32.36*   HGB 10.2*   < > 7.8* 11.0*   HCT 31.3*   < > 23* 33.1*     --   --  293    < > = values in this interval not displayed.     BMP    Recent Labs     07/24/24  0456 07/24/24  0901 07/24/24  1557  07/24/24 1939   SODIUM 139  --   --  141   K 3.6  --   --  5.3     --   --  114*   CO2 23   < > 18* 17*   AGAP 12  --   --  10   BUN 24  --   --  25   CREATININE 1.46*  --   --  1.41*   CALCIUM 8.8  --   --  8.8    < > = values in this interval not displayed.       Coags    Recent Labs     07/24/24  1504   INR 1.51*        Additional Electrolytes  Recent Labs     07/24/24  1341 07/24/24  1551 07/24/24 1939   MG  --   --  1.6*   PHOS  --   --  6.4*   CAIONIZED 1.43* 1.02*  --           Blood Gas    Recent Labs     07/24/24 1939   PHART 7.305*   [id number] 36.5   PO2ART 273.7*   [id number] 17.8*   BEART -7.8   SOURCE Line, Arterial     Recent Labs     07/24/24 1939   SOURCE Line, Arterial    LFTs  Recent Labs     07/23/24  0444 07/24/24 1939   ALT <3* 3*   AST 12* 15   ALKPHOS 108* 74   ALB 3.2* 2.7*   TBILI 0.34 0.48       Infectious  No recent results  Glucose  Recent Labs     07/23/24  0444 07/24/24  0456 07/24/24 1939   GLUC 135 127 228*               Hannah Baxter PA-C

## 2024-07-25 NOTE — PROGRESS NOTES
Cardiology Progress Note - Anamaria Parnell 79 y.o. female MRN: [id number]    Unit/Bed#: ICU 08 Encounter: [id number]      Assessment & Plan:    Gangrene (HCC)  -Presented with right great toe gangrene/osteomyelitis  -Underwent bilateral femoral endarterectomy with left to right femoral-femoral bypass and retrograde LIMA stenting and bilateral VAC placement on 7/24    Coronary artery disease of native artery of native heart with stable angina pectoris (HCC)  -TTE 7/21/2024 showed EF 41%, severe LAE, moderate MR, mild TR, estimated PA pressure 59 mmHg  -Nuclear pharmacologic stress test 8/18/2023 showed no evidence of inducible ischemia or scar, preserved LVEF  - LHC 2/26/2020 showed 50 to 60% mid LAD, D2 small vessels with 90% stenosis, moderate atherosclerosis of the LCx,  of the proximal RCA with left-to-right collaterals  - Continue with aspirin 81 mg daily Brilinta 90 mg twice daily    Aortoiliac occlusive disease (HCC)  -CTA with runoff 7/18/2024 showed marked soft plaque in the abdominal aorta, bilateral multifocal severe stenosis within the external iliac and common femoral arteries, long segment of the SFA occlusion extending from the ostial to the level of the popliteal artery  - Underwent femoral endarterectomy, bypass and stenting with vascular surgery on 7/24  - Continue with aspirin 81 mg daily and Brilinta 90 mg twice daily    Primary hypertension  -Currently on amlodipine 5 mg daily, clonidine 0.3 mg patch weekly, and Toprol- mg daily    Hyperlipidemia  - Continue with pravastatin 80 mg daily    Basilar artery stenosis    CVA (cerebral vascular accident) (HCC)  - Status post left TCAR in September 2023    Stage 4 chronic kidney disease (HCC)    Depression, recurrent (HCC)    Type 2 diabetes mellitus, without long-term current use of insulin (HCC)    Cellulitis of toe of right foot     Summary:  - Postop day 1, patient was extubated this morning and is satting well on 2 L nasal cannula  - Has  "some trace JVD and crackles on exam, received furosemide 40 mg IV x 1 this morning  - Goal net -1 to 2 L, if urine output decreases afternoon, would recommend giving a second dose of furosemide 40 mg IV  - Check daily standing weights  - Monitor creatinine and electrolytes closely      Subjective:   Patient was extubated this morning.  She reports feeling well.  Denies any major pain in her legs, chest pain, significant shortness of breath, or other concerning cardiac symptoms at this time.  Also denies abdominal pain, nausea, vomiting, fever, chills, headache, dizziness or palpitations.    Objective:     Vitals: Blood pressure 111/56, pulse 69, temperature 97.6 °F (36.4 °C), temperature source Oral, resp. rate 18, height 4' 10\" (1.473 m), weight 73.3 kg (161 lb 9.6 oz), SpO2 99%., Body mass index is 33.77 kg/m².,   Orthostatic Blood Pressures      Flowsheet Row Most Recent Value   Blood Pressure 111/56 filed at 07/25/2024 1130   Patient Position - Orthostatic VS Lying filed at 07/25/2024 1130              Intake/Output Summary (Last 24 hours) at 7/25/2024 1148  Last data filed at 7/25/2024 1000  Gross per 24 hour   Intake 8080.16 ml   Output 2255 ml   Net 5825.16 ml           Physical Exam:    GEN: Anamaria Parnell appears well, alert and oriented x 3, pleasant and cooperative   HEENT: Mucous membranes moist, no scleral icterus, no conjunctival pallor  NECK: + JVD  HEART: Regular rate and rhythm, normal S1 and S2, no murmurs or rubs   LUNGS: Trace bilateral crackles  ABDOMEN: normal bowel sounds, soft, no tenderness, no distention  EXTREMITIES: peripheral pulses normal; no significant lower extremity edema, lateral wound VAC on upper legs  NEURO: no focal findings   SKIN: Gangrenous right great toe wrapped in gauze        Current Facility-Administered Medications:     acetaminophen (TYLENOL) tablet 975 mg, 975 mg, Oral, Q8H Harvey HERRERA DO, 975 mg at 07/25/24 0550    allopurinol (ZYLOPRIM) tablet 100 mg, 100 " mg, Oral, Daily, Harvey Rey DO, 100 mg at 07/25/24 0805    amLODIPine (NORVASC) tablet 5 mg, 5 mg, Oral, Daily, Harvey Rey DO, 5 mg at 07/25/24 0806    aspirin chewable tablet 81 mg, 81 mg, Oral, Daily, Harvey Rey DO, 81 mg at 07/25/24 0805    chlorhexidine (PERIDEX) 0.12 % oral rinse 15 mL, 15 mL, Mouth/Throat, Q12H JAVIER, NITZA Sinha-GASPER, 15 mL at 07/25/24 0805    cloNIDine (CATAPRES-TTS-3) 0.3 mg/24 hr TD weekly patch, 1 patch, Transdermal, Weekly, Harvey Rey DO, 0.3 mg at 07/23/24 0825    escitalopram (LEXAPRO) tablet 20 mg, 20 mg, Oral, Daily, Harvey Rey DO, 20 mg at 07/25/24 0805    heparin (porcine) subcutaneous injection 5,000 Units, 5,000 Units, Subcutaneous, Q8H JAVIER, 5,000 Units at 07/25/24 0550 **AND** [CANCELED] Platelet count, , , Once, Harvey Rey DO    HYDROmorphone (DILAUDID) injection 0.5 mg, 0.5 mg, Intravenous, Q5 Min PRN, Mp Nielsen DO    HYDROmorphone HCl (DILAUDID) injection 0.2 mg, 0.2 mg, Intravenous, Q6H PRN, Harvey Rey DO    insulin lispro (HumALOG/ADMELOG) 100 units/mL subcutaneous injection 1-5 Units, 1-5 Units, Subcutaneous, TID AC, 1 Units at 07/25/24 1036 **AND** Fingerstick Glucose (POCT), , , TID AC, Harvey Rey DO    lactated ringers bolus 500 mL, 500 mL, Intravenous, Once PRN **AND** lactated ringers bolus 500 mL, 500 mL, Intravenous, Once PRN, Harvey Rey DO    lidocaine (LIDODERM) 5 % patch 1 patch, 1 patch, Topical, Daily, Harvey Rey DO, 1 patch at 07/25/24 0805    LORazepam (ATIVAN) tablet 0.5 mg, 0.5 mg, Oral, Q8H PRN, Harvey Rey DO, 0.5 mg at 07/23/24 1307    metoprolol succinate (TOPROL-XL) 24 hr tablet 100 mg, 100 mg, Oral, Daily, Harvey Rey DO, 100 mg at 07/25/24 0805    ondansetron (ZOFRAN) injection 4 mg, 4 mg, Intravenous, Q6H PRN, Harvey Rey DO    ondansetron (ZOFRAN) injection 4 mg, 4 mg, Intravenous, Once PRN, Mp Nielsen DO    oxyCODONE (ROXICODONE) IR tablet 5 mg, 5 mg, Oral, Q6H PRN, Harvey Rey DO, 5 mg at  "07/24/24 0503    pravastatin (PRAVACHOL) tablet 80 mg, 80 mg, Oral, Daily With Dinner, Harvey Rey DO, 80 mg at 07/23/24 1732    sodium bicarbonate tablet 650 mg, 650 mg, Oral, BID after meals, Harvey Rey DO, 650 mg at 07/25/24 0730    sodium chloride 0.9 % bolus 500 mL, 500 mL, Intravenous, Once PRN **AND** sodium chloride 0.9 % bolus 500 mL, 500 mL, Intravenous, Once PRN, Harvey Rey DO    ticagrelor (BRILINTA) tablet 90 mg, 90 mg, Oral, Q12H JAVIER, Harvey Rey DO, 90 mg at 07/25/24 0805    traZODone (DESYREL) tablet 50 mg, 50 mg, Oral, HS, Harvey Rey DO, 50 mg at 07/23/24 2133    Labs & Results:    No results found for: \"CKTOTAL\", \"CKMB\", \"CKMBINDEX\", \"TROPONINI\"    Lab Results   Component Value Date    GLUCOSE 179 (H) 07/24/2024    CALCIUM 8.2 (L) 07/25/2024    K 4.5 07/25/2024    CO2 18 (L) 07/25/2024     (H) 07/25/2024    BUN 30 (H) 07/25/2024    CREATININE 1.59 (H) 07/25/2024       Lab Results   Component Value Date    WBC 25.83 (H) 07/25/2024    HGB 9.4 (L) 07/25/2024    HCT 28.1 (L) 07/25/2024    MCV 90 07/25/2024     07/25/2024     Results from last 7 days   Lab Units 07/24/24  1504   INR  1.51*       No results found for: \"CHOL\"  Lab Results   Component Value Date    HDL 49 (L) 01/06/2024    HDL 52 09/03/2023     Lab Results   Component Value Date    LDLCALC 115 (H) 01/06/2024    LDLCALC 59 09/03/2023     Lab Results   Component Value Date    TRIG 161 (H) 01/06/2024    TRIG 164 (H) 09/03/2023       Lab Results   Component Value Date    ALT 3 (L) 07/24/2024    AST 15 07/24/2024    ALKPHOS 74 07/24/2024         EKG personally reviewed by )Dawson Almonte MD. No acute changes   TELE: No significant arrhythmias seen on telemetry review.           "

## 2024-07-25 NOTE — PHYSICAL THERAPY NOTE
Physical Therapy Cancellation Note         07/25/24 1030   PT Last Visit   PT Visit Date 07/25/24   Note Type   Note Type Cancelled Session   Cancel Reasons Medical status  (intubated. will cx and continue to follow as appropriate.)       Alicia Abbott, PT

## 2024-07-25 NOTE — ASSESSMENT & PLAN NOTE
Lab Results   Component Value Date    HGBA1C 5.9 (H) 07/09/2024       Recent Labs     07/23/24  1613 07/23/24  2103 07/24/24  0734 07/24/24  2138   POCGLU 159* 148* 138 189*         Blood Sugar Average: Last 72 hrs:  (P) 138  -ssi  -accuchecks

## 2024-07-26 PROBLEM — N17.9 ACUTE RENAL FAILURE SUPERIMPOSED ON STAGE 4 CHRONIC KIDNEY DISEASE (HCC): Status: ACTIVE | Noted: 2020-11-03

## 2024-07-26 LAB
ANION GAP SERPL CALCULATED.3IONS-SCNC: 10 MMOL/L (ref 4–13)
BUN SERPL-MCNC: 39 MG/DL (ref 5–25)
CALCIUM SERPL-MCNC: 8.2 MG/DL (ref 8.4–10.2)
CHLORIDE SERPL-SCNC: 106 MMOL/L (ref 96–108)
CO2 SERPL-SCNC: 23 MMOL/L (ref 21–32)
CREAT SERPL-MCNC: 1.92 MG/DL (ref 0.6–1.3)
ERYTHROCYTE [DISTWIDTH] IN BLOOD BY AUTOMATED COUNT: 14.9 % (ref 11.6–15.1)
GFR SERPL CREATININE-BSD FRML MDRD: 24 ML/MIN/1.73SQ M
GLUCOSE SERPL-MCNC: 134 MG/DL (ref 65–140)
GLUCOSE SERPL-MCNC: 159 MG/DL (ref 65–140)
GLUCOSE SERPL-MCNC: 162 MG/DL (ref 65–140)
GLUCOSE SERPL-MCNC: 183 MG/DL (ref 65–140)
GLUCOSE SERPL-MCNC: 185 MG/DL (ref 65–140)
HCT VFR BLD AUTO: 24.7 % (ref 34.8–46.1)
HGB BLD-MCNC: 8.3 G/DL (ref 11.5–15.4)
MCH RBC QN AUTO: 30.3 PG (ref 26.8–34.3)
MCHC RBC AUTO-ENTMCNC: 33.6 G/DL (ref 31.4–37.4)
MCV RBC AUTO: 90 FL (ref 82–98)
PLATELET # BLD AUTO: 324 THOUSANDS/UL (ref 149–390)
PMV BLD AUTO: 10.4 FL (ref 8.9–12.7)
POTASSIUM SERPL-SCNC: 3.8 MMOL/L (ref 3.5–5.3)
RBC # BLD AUTO: 2.74 MILLION/UL (ref 3.81–5.12)
SODIUM SERPL-SCNC: 139 MMOL/L (ref 135–147)
WBC # BLD AUTO: 24.31 THOUSAND/UL (ref 4.31–10.16)

## 2024-07-26 PROCEDURE — 99232 SBSQ HOSP IP/OBS MODERATE 35: CPT | Performed by: STUDENT IN AN ORGANIZED HEALTH CARE EDUCATION/TRAINING PROGRAM

## 2024-07-26 PROCEDURE — NC001 PR NO CHARGE: Performed by: SURGERY

## 2024-07-26 PROCEDURE — 82948 REAGENT STRIP/BLOOD GLUCOSE: CPT

## 2024-07-26 PROCEDURE — 80048 BASIC METABOLIC PNL TOTAL CA: CPT | Performed by: PHYSICIAN ASSISTANT

## 2024-07-26 PROCEDURE — 85027 COMPLETE CBC AUTOMATED: CPT | Performed by: NURSE PRACTITIONER

## 2024-07-26 PROCEDURE — 97605 NEG PRS WND THER DME<=50SQCM: CPT | Performed by: NURSE PRACTITIONER

## 2024-07-26 PROCEDURE — 99232 SBSQ HOSP IP/OBS MODERATE 35: CPT | Performed by: INTERNAL MEDICINE

## 2024-07-26 PROCEDURE — 93005 ELECTROCARDIOGRAM TRACING: CPT

## 2024-07-26 RX ADMIN — OXYCODONE HYDROCHLORIDE 5 MG: 5 TABLET ORAL at 13:42

## 2024-07-26 RX ADMIN — METOPROLOL SUCCINATE 100 MG: 50 TABLET, EXTENDED RELEASE ORAL at 08:10

## 2024-07-26 RX ADMIN — TICAGRELOR 90 MG: 90 TABLET ORAL at 20:56

## 2024-07-26 RX ADMIN — ASPIRIN 81 MG CHEWABLE TABLET 81 MG: 81 TABLET CHEWABLE at 08:10

## 2024-07-26 RX ADMIN — SODIUM BICARBONATE 650 MG TABLET 650 MG: at 17:03

## 2024-07-26 RX ADMIN — ACETAMINOPHEN 975 MG: 325 TABLET, FILM COATED ORAL at 21:01

## 2024-07-26 RX ADMIN — TICAGRELOR 90 MG: 90 TABLET ORAL at 08:10

## 2024-07-26 RX ADMIN — HEPARIN SODIUM 5000 UNITS: 5000 INJECTION INTRAVENOUS; SUBCUTANEOUS at 21:01

## 2024-07-26 RX ADMIN — INSULIN LISPRO 1 UNITS: 100 INJECTION, SOLUTION INTRAVENOUS; SUBCUTANEOUS at 07:52

## 2024-07-26 RX ADMIN — ALLOPURINOL 100 MG: 100 TABLET ORAL at 08:10

## 2024-07-26 RX ADMIN — LIDOCAINE 5% 1 PATCH: 700 PATCH TOPICAL at 08:00

## 2024-07-26 RX ADMIN — AMLODIPINE BESYLATE 5 MG: 5 TABLET ORAL at 08:10

## 2024-07-26 RX ADMIN — INSULIN LISPRO 1 UNITS: 100 INJECTION, SOLUTION INTRAVENOUS; SUBCUTANEOUS at 11:58

## 2024-07-26 RX ADMIN — ESCITALOPRAM OXALATE 20 MG: 20 TABLET ORAL at 08:10

## 2024-07-26 RX ADMIN — SODIUM BICARBONATE 650 MG TABLET 650 MG: at 08:13

## 2024-07-26 RX ADMIN — TRAZODONE HYDROCHLORIDE 50 MG: 50 TABLET ORAL at 21:01

## 2024-07-26 RX ADMIN — HEPARIN SODIUM 5000 UNITS: 5000 INJECTION INTRAVENOUS; SUBCUTANEOUS at 13:28

## 2024-07-26 RX ADMIN — HEPARIN SODIUM 5000 UNITS: 5000 INJECTION INTRAVENOUS; SUBCUTANEOUS at 05:48

## 2024-07-26 RX ADMIN — PRAVASTATIN SODIUM 80 MG: 80 TABLET ORAL at 17:03

## 2024-07-26 RX ADMIN — ACETAMINOPHEN 975 MG: 325 TABLET, FILM COATED ORAL at 13:28

## 2024-07-26 RX ADMIN — ACETAMINOPHEN 975 MG: 325 TABLET, FILM COATED ORAL at 05:48

## 2024-07-26 NOTE — ASSESSMENT & PLAN NOTE
History of CVA. MRI 7/2023 showing: Large area of acute to subacute ischemia left PCA distribution occipital lobe. Small foci of acute to subacute ischemia in the left frontal and left parietal/temporal lobes MCA distribution.  Continue Aspirin and statin

## 2024-07-26 NOTE — CASE MANAGEMENT
Case Management Discharge Planning Note    Patient name Anamaria Parnell  Location [place] [address] /[facility] 2 M* MRN [id number]  : 1945 Date 2024       Current Admission Date: 2024  Current Admission Diagnosis:Gangrene (HCC)   Patient Active Problem List    Diagnosis Date Noted Date Diagnosed    Coronary artery disease of native artery of native heart with stable angina pectoris (HCC) 2024     Cellulitis of toe of right foot 2024     Sepsis (HCC) 2024     Gangrene (HCC) 2024     Diabetic ulcer of toe of right foot associated with diabetes mellitus due to underlying condition, limited to breakdown of skin (HCC) 2024     Carotid stenosis, asymptomatic, right 10/27/2023     Complex renal cyst 10/18/2023     Encounter for follow-up surveillance of urothelial carcinoma of lower urinary tract 10/18/2023     Encounter to discuss test results 10/17/2023     Smoking 2023     Renal cyst 2023     Primary hypertension 2023     Dysarthria 08/15/2023     Stage 3b chronic kidney disease (HCC)      Aortoiliac occlusive disease (HCC) 10/11/2022     History of gastrointestinal stromal tumor (GIST) 2022     Secondary hyperparathyroidism of renal origin (HCC) 2022     Gastrointestinal stromal tumor (GIST) (HCC) 2022     Type 2 diabetes mellitus, without long-term current use of insulin (HCC) 2021     Type 2 diabetes mellitus with diabetic peripheral angiopathy without gangrene (HCC) 2021     Depression, recurrent (HCC) 2021     Stage 4 chronic kidney disease (HCC) 2020     Hypertensive kidney disease with stage 4 chronic kidney disease (HCC) 2020     Cervical radiculopathy 2020     Malignant neoplasm of overlapping sites of bladder (HCC) 2020     Stenosis of left anterior descending artery Mid LAD 50-60% stenosis 2020     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  circ 04/01/2020     Pulmonary nodule 7 mm groundglass opacity in the right upper lobe, unchanged since October 2019 03/19/2020     Atherosclerosis of native arteries of right leg with ulceration of other part of foot ([facility]) 03/03/2020     Symptomatic carotid artery stenosis, left 03/03/2020     Femoral artery stenosis, right ([facility]) 50-75% stenosis in the common femoral artery. 01/14/2020     Mesenteric artery stenosis (HCC) 01/14/2020     Positive cardiac stress test  small, mildly severe, partially reversible myocardial perfusion defect of anterior and inferior wall  11/12/2019     Abnormal CT of the chest suspicious for an infectious or inflammatory bronchiolitis. 11/07/2019     Celiac artery stenosis ([facility]) >70% stenosis in the celiac trunk 11/05/2019     Aortoiliac stenosis, left ([facility]) 02/25/2019     Spondylosis of lumbar region without myelopathy or radiculopathy 01/29/2019     Lumbosacral spondylosis without myelopathy 01/29/2019     Statin intolerance 01/22/2019     Lumbar disc herniation 01/22/2019     Herniated lumbar intervertebral disc 01/22/2019     Chronic GERD 12/04/2017     Acute renal failure superimposed on stage 3 chronic kidney disease ([facility]) 12/04/2017     Claudication in peripheral vascular disease ([facility]) 12/04/2017     Gout 12/04/2017     Hx-TIA (transient ischemic attack) 12/04/2017     Hyperlipidemia associated with type 2 diabetes mellitus  ([facility]) 12/04/2017     Hypertension associated with diabetes  ([facility]) 12/04/2017     Osteoarthritis 12/04/2017     Right renal artery stenosis ([facility]) 12/04/2017     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     Hyperlipidemia, unspecified 12/04/2017     Vitamin D deficiency 09/08/2016     Basilar artery stenosis 06/22/2016     Type 2 diabetes mellitus with diabetic nephropathy ([facility]) 12/19/2015     Hypercholesteremia 12/19/2015     Hypertension 12/19/2015     CVA (cerebral vascular accident) ([facility]) 11/09/2015       LOS (days): 7  Geometric Mean LOS (GMLOS) (days):  3.1  Days to GMLOS:-3.7     OBJECTIVE:  Risk of Unplanned Readmission Score: 45.91         Current admission status: Inpatient   Preferred Pharmacy:   [id number] [place] - [address]  [address]  [place] [address]  Phone: [phone] Fax: [phone]    Primary Care Provider: Ritchie Lawton MD    Primary Insurance: [facility]  Secondary Insurance:     DISCHARGE DETAILS:    Additional Comments: CM spoke with patient's sonAustin via phone contact, CM provided patient with choice list for STR via email. CM to follow for patient's choice list this afternoon. CM to follow for further patient choice list.    Saima Nj,

## 2024-07-26 NOTE — ASSESSMENT & PLAN NOTE
Lab Results   Component Value Date    HGBA1C 5.9 (H) 07/09/2024     Recent Labs     07/25/24  2118 07/26/24  0722 07/26/24  1115 07/26/24  1639   POCGLU 179* 159* 162* 134       Home regimen: Tradjenta  Inpatient regimen: Sliding scale

## 2024-07-26 NOTE — PHYSICAL THERAPY NOTE
Physical Therapy Cancellation Note         07/26/24 1649   PT Last Visit   PT Visit Date 07/26/24   Note Type   Note Type Cancelled Session   Cancel Reasons Other  (recieving bedside wound care by vascular. will cx and continue to follow)       Alicia Abbott, PT

## 2024-07-26 NOTE — CASE MANAGEMENT
Case Management Discharge Planning Note    Patient name Anamaria Parnell  Location [place] [address] /[facility] 2 M* MRN [id number]  : 1945 Date 2024       Current Admission Date: 2024  Current Admission Diagnosis:Gangrene (HCC)   Patient Active Problem List    Diagnosis Date Noted Date Diagnosed    Coronary artery disease of native artery of native heart with stable angina pectoris (HCC) 2024     Cellulitis of toe of right foot 2024     Sepsis (HCC) 2024     Gangrene (HCC) 2024     Diabetic ulcer of toe of right foot associated with diabetes mellitus due to underlying condition, limited to breakdown of skin (HCC) 2024     Carotid stenosis, asymptomatic, right 10/27/2023     Complex renal cyst 10/18/2023     Encounter for follow-up surveillance of urothelial carcinoma of lower urinary tract 10/18/2023     Encounter to discuss test results 10/17/2023     Smoking 2023     Renal cyst 2023     Primary hypertension 2023     Dysarthria 08/15/2023     Stage 3b chronic kidney disease (HCC)      Aortoiliac occlusive disease (HCC) 10/11/2022     History of gastrointestinal stromal tumor (GIST) 2022     Secondary hyperparathyroidism of renal origin (HCC) 2022     Gastrointestinal stromal tumor (GIST) (HCC) 2022     Type 2 diabetes mellitus, without long-term current use of insulin (HCC) 2021     Type 2 diabetes mellitus with diabetic peripheral angiopathy without gangrene (HCC) 2021     Depression, recurrent (HCC) 2021     Stage 4 chronic kidney disease (HCC) 2020     Hypertensive kidney disease with stage 4 chronic kidney disease (HCC) 2020     Cervical radiculopathy 2020     Malignant neoplasm of overlapping sites of bladder (HCC) 2020     Stenosis of left anterior descending artery Mid LAD 50-60% stenosis 2020     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  circ 04/01/2020     Pulmonary nodule 7 mm groundglass opacity in the right upper lobe, unchanged since October 2019 03/19/2020     Atherosclerosis of native arteries of right leg with ulceration of other part of foot ([facility]) 03/03/2020     Symptomatic carotid artery stenosis, left 03/03/2020     Femoral artery stenosis, right ([facility]) 50-75% stenosis in the common femoral artery. 01/14/2020     Mesenteric artery stenosis (HCC) 01/14/2020     Positive cardiac stress test  small, mildly severe, partially reversible myocardial perfusion defect of anterior and inferior wall  11/12/2019     Abnormal CT of the chest suspicious for an infectious or inflammatory bronchiolitis. 11/07/2019     Celiac artery stenosis ([facility]) >70% stenosis in the celiac trunk 11/05/2019     Aortoiliac stenosis, left ([facility]) 02/25/2019     Spondylosis of lumbar region without myelopathy or radiculopathy 01/29/2019     Lumbosacral spondylosis without myelopathy 01/29/2019     Statin intolerance 01/22/2019     Lumbar disc herniation 01/22/2019     Herniated lumbar intervertebral disc 01/22/2019     Chronic GERD 12/04/2017     Acute renal failure superimposed on stage 3 chronic kidney disease ([facility]) 12/04/2017     Claudication in peripheral vascular disease ([facility]) 12/04/2017     Gout 12/04/2017     Hx-TIA (transient ischemic attack) 12/04/2017     Hyperlipidemia associated with type 2 diabetes mellitus  ([facility]) 12/04/2017     Hypertension associated with diabetes  ([facility]) 12/04/2017     Osteoarthritis 12/04/2017     Right renal artery stenosis ([facility]) 12/04/2017     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     Hyperlipidemia, unspecified 12/04/2017     Vitamin D deficiency 09/08/2016     Basilar artery stenosis 06/22/2016     Type 2 diabetes mellitus with diabetic nephropathy ([facility]) 12/19/2015     Hypercholesteremia 12/19/2015     Hypertension 12/19/2015     CVA (cerebral vascular accident) ([facility]) 11/09/2015       LOS (days): 7  Geometric Mean LOS (GMLOS) (days):  3.1  Days to GMLOS:-3.8     OBJECTIVE:  Risk of Unplanned Readmission Score: 45.36         Current admission status: Inpatient   Preferred Pharmacy:   [id number] [place] - [address]  [address]  [place] [address]  Phone: [phone] Fax: [phone]    Primary Care Provider: Ritchie Lawton MD    Primary Insurance: [facility]  Secondary Insurance:     DISCHARGE DETAILS:    Discharge planning discussed with:: Patient's Austin singleton  Freedom of Choice: Yes  Comments - Freedom of Choice: Ausitn states he will review list of accepting rehab facilities w/ patient then choose; choice list provided  CM contacted family/caregiver?: Yes (Patient's sonAustin)  Were Treatment Team discharge recommendations reviewed with patient/caregiver?: Yes  Did patient/caregiver verbalize understanding of patient care needs?: N/A- going to facility  Were patient/caregiver advised of the risks associated with not following Treatment Team discharge recommendations?: Yes    Contacts  Patient Contacts: austin mcfarland (Son)  [phone] (Mobile)  Relationship to Patient:: Family  Contact Method: Phone  Phone Number: [phone]  Reason/Outcome: Emergency Contact, Discharge Planning, Continuity of Care    Requested Home Health Care         Is the patient interested in HHC at discharge?: No    DME Referral Provided  Referral made for DME?: No    Other Referral/Resources/Interventions Provided:  Interventions: Short Term Rehab  Referral Comments: STR-- Provided choice list    Treatment Team Recommendation: Short Term Rehab  Discharge Destination Plan:: Short Term Rehab        Additional Comments: CM provided patient's Austin singleton STR choice list at bedside as patient's son unable to open STR email. CM to follow for further discharge planning.       Saima Nj,

## 2024-07-26 NOTE — ASSESSMENT & PLAN NOTE
79-year-old female with history of PAD, CAD, diabetes, and hypertension who presented from the [facility] due to right great toe gangrene.  She was transferred here for vascular bypass. XR right foot showing cortical destruction along the ventral aspect of the first distal phalanx worrisome for acute osteomyelitis.     S/p Vascular surgery intervention 7/24/2024  DAPT: Aspirin Brillinta  Continue pravastatin  Patient has received a 10 day course in the ICU of cefazolin, given no surgical cure yet will have Infectious disease assess for further need for antibiotic treatment.

## 2024-07-26 NOTE — PLAN OF CARE
Problem: PAIN - ADULT  Goal: Verbalizes/displays adequate comfort level or baseline comfort level  Description: Interventions:  - Encourage patient to monitor pain and request assistance  - Assess pain using appropriate pain scale  - Administer analgesics based on type and severity of pain and evaluate response  - Implement non-pharmacological measures as appropriate and evaluate response  - Consider cultural and social influences on pain and pain management  - Notify physician/advanced practitioner if interventions unsuccessful or patient reports new pain  Outcome: Progressing     Problem: INFECTION - ADULT  Goal: Absence or prevention of progression during hospitalization  Description: INTERVENTIONS:  - Assess and monitor for signs and symptoms of infection  - Monitor lab/diagnostic results  - Monitor all insertion sites, i.e. indwelling lines, tubes, and drains  - Monitor endotracheal if appropriate and nasal secretions for changes in amount and color  - [place] appropriate cooling/warming therapies per order  - Administer medications as ordered  - Instruct and encourage patient and family to use good hand hygiene technique  - Identify and instruct in appropriate isolation precautions for identified infection/condition  Outcome: Progressing     Problem: SAFETY ADULT  Goal: Patient will remain free of falls  Description: INTERVENTIONS:  - Educate patient/family on patient safety including physical limitations  - Instruct patient to call for assistance with activity   - Consult OT/PT to assist with strengthening/mobility   - Keep Call bell within reach  - Keep bed low and locked with side rails adjusted as appropriate  - Keep care items and personal belongings within reach  - Initiate and maintain comfort rounds  - Make Fall Risk Sign visible to staff  - Offer Toileting every 2 Hours, in advance of need  - Initiate/Maintain bed alarm  - Apply yellow socks and bracelet for high fall risk patients  - Consider  moving patient to room near nurses station  Outcome: Progressing  Goal: Maintain or return to baseline ADL function  Description: INTERVENTIONS:  -  Assess patient's ability to carry out ADLs; assess patient's baseline for ADL function and identify physical deficits which impact ability to perform ADLs (bathing, care of mouth/teeth, toileting, grooming, dressing, etc.)  - Assess/evaluate cause of self-care deficits   - Assess range of motion  - Assess patient's mobility; develop plan if impaired  - Assess patient's need for assistive devices and provide as appropriate  - Encourage maximum independence but intervene and supervise when necessary  - Involve family in performance of ADLs  - Assess for home care needs following discharge   - Consider OT consult to assist with ADL evaluation and planning for discharge  - Provide patient education as appropriate  Outcome: Progressing  Goal: Maintains/Returns to pre admission functional level  Description: INTERVENTIONS:  - Perform AM-PAC 6 Click Basic Mobility/ Daily Activity assessment daily.  - Set and communicate daily mobility goal to care team and patient/family/caregiver.   - Collaborate with rehabilitation services on mobility goals if consulted  - Stand patient 3 times a day  - Ambulate patient 3 times a day  - Out of bed to chair 3 times a day   - Out of bed for meals 3 times a day  - Out of bed for toileting  - Record patient progress and toleration of activity level   Outcome: Progressing     Problem: DISCHARGE PLANNING  Goal: Discharge to home or other facility with appropriate resources  Description: INTERVENTIONS:  - Identify barriers to discharge w/patient and caregiver  - Arrange for needed discharge resources and transportation as appropriate  - Identify discharge learning needs (meds, wound care, etc.)  - Arrange for interpretive services to assist at discharge as needed  - Refer to Case Management Department for coordinating discharge planning if the  patient needs post-hospital services based on physician/advanced practitioner order or complex needs related to functional status, cognitive ability, or social support system  Outcome: Progressing     Problem: Knowledge Deficit  Goal: Patient/family/caregiver demonstrates understanding of disease process, treatment plan, medications, and discharge instructions  Description: Complete learning assessment and assess knowledge base.  Interventions:  - Provide teaching at level of understanding  - Provide teaching via preferred learning methods  Outcome: Progressing     Problem: CARDIOVASCULAR - ADULT  Goal: Maintains optimal cardiac output and hemodynamic stability  Description: INTERVENTIONS:  - Monitor I/O, vital signs and rhythm  - Monitor for S/S and trends of decreased cardiac output  - Administer and titrate ordered vasoactive medications to optimize hemodynamic stability  - Assess quality of pulses, skin color and temperature  - Assess for signs of decreased coronary artery perfusion  - Instruct patient to report change in severity of symptoms  Outcome: Progressing     Problem: METABOLIC, FLUID AND ELECTROLYTES - ADULT  Goal: Electrolytes maintained within normal limits  Description: INTERVENTIONS:  - Monitor labs and assess patient for signs and symptoms of electrolyte imbalances  - Administer electrolyte replacement as ordered  - Monitor response to electrolyte replacements, including repeat lab results as appropriate  - Instruct patient on fluid and nutrition as appropriate  Outcome: Progressing  Goal: Fluid balance maintained  Description: INTERVENTIONS:  - Monitor labs   - Monitor I/O and WT  - Instruct patient on fluid and nutrition as appropriate  - Assess for signs & symptoms of volume excess or deficit  Outcome: Progressing  Goal: Glucose maintained within target range  Description: INTERVENTIONS:  - Monitor Blood Glucose as ordered  - Assess for signs and symptoms of hyperglycemia and hypoglycemia  -  Administer ordered medications to maintain glucose within target range  - Assess nutritional intake and initiate nutrition service referral as needed  Outcome: Progressing     Problem: HEMATOLOGIC - ADULT  Goal: Maintains hematologic stability  Description: INTERVENTIONS  - Assess for signs and symptoms of bleeding or hemorrhage  - Monitor labs  - Administer supportive blood products/factors as ordered and appropriate  Outcome: Progressing     Problem: MUSCULOSKELETAL - ADULT  Goal: Maintain or return mobility to safest level of function  Description: INTERVENTIONS:  - Assess patient's ability to carry out ADLs; assess patient's baseline for ADL function and identify physical deficits which impact ability to perform ADLs (bathing, care of mouth/teeth, toileting, grooming, dressing, etc.)  - Assess/evaluate cause of self-care deficits   - Assess range of motion  - Assess patient's mobility  - Assess patient's need for assistive devices and provide as appropriate  - Encourage maximum independence but intervene and supervise when necessary  - Involve family in performance of ADLs  - Assess for home care needs following discharge   - Consider OT consult to assist with ADL evaluation and planning for discharge  - Provide patient education as appropriate  Outcome: Progressing  Goal: Maintain proper alignment of affected body part  Description: INTERVENTIONS:  - Support, maintain and protect limb and body alignment  - Provide patient/ family with appropriate education  Outcome: Progressing     Problem: Prexisting or High Potential for Compromised Skin Integrity  Goal: Skin integrity is maintained or improved  Description: INTERVENTIONS:  - Identify patients at risk for skin breakdown  - Assess and monitor skin integrity  - Assess and monitor nutrition and hydration status  - Monitor labs   - Assess for incontinence   - Turn and reposition patient  - Assist with mobility/ambulation  - Relieve pressure over bony  prominences  - Avoid friction and shearing  - Provide appropriate hygiene as needed including keeping skin clean and dry  - Evaluate need for skin moisturizer/barrier cream  - Collaborate with interdisciplinary team   - Patient/family teaching  - Consider wound care consult   Outcome: Progressing

## 2024-07-26 NOTE — WOUND OSTOMY CARE
Progress Note - Wound   Anamaria Parnell 79 y.o. female MRN: [id number]  Unit/Bed#: [place] [address] -01 Encounter: [id number]        Assessment:   Patient seen for wound care follow-up.  She is agreeable to assessment.  Sitting up in chair on offloading air cushion.  Able to stand with assist x 1 and walker.  Mclaughlin catheter in place, continent of stool.  Left heel intact.  Nutrition team following.  Present on admission wound to right buttock--pink, stable.  No drainage.  Jessica-wound intact, pink, hyperpigmented, blanchable.  Left buttock--stable, dry.  Remains scabbed with pink and hyperpigmented, blanchable jessica-wound.  Right great toe--dressing dry and intact per podiatry team.  Groin incisions--VAC dressing intact per vascular surgery team.    See flowsheet for wound details.     Wound Care Plan:   1-Apply silicone bordered foam dressings to bilateral heels for prevention.  Fabián with P.  Peel back for skin assessments at least daily and re-apply.  Change dressings every 3 days and as needed.  2-Elevate heels off of bed/chair surface to offload pressure.  3-Offloading air cushion in chair when out of bed.  4-Apply moisturizing skin cream to body daily and as needed.  5-Turn and reposition every 2 hours while in bed and weight shift frequently while in the chair to re-distribute pressure on skin.    6-Sacrum/buttocks--cleanse with soap and water, pat dry.  Apply silicone bordered foam dressing for treatment.  Change dressing every other day and as needed with soilage.     Wound care team to follow.  Plan of care reviewed with primary RN.     Wound 07/16/24 Pressure Injury Buttocks Left (Active)   Wound Description Dry;Brown;Pink 07/26/24 1257   Jessica-wound Assessment Scar Tissue;Hyperpigmented 07/26/24 1257   Wound Length (cm) 0 cm 07/26/24 1257   Wound Width (cm) 0 cm 07/26/24 1257   Wound Depth (cm) 0 cm 07/26/24 1257   Wound Surface Area (cm^2) 0 cm^2 07/26/24 1257   Wound Volume (cm^3) 0 cm^3 07/26/24 1257    Calculated Wound Volume (cm^3) 0 cm^3 07/26/24 1257   Drainage Amount None 07/26/24 1257   Treatments Cleansed 07/26/24 1257   Dressing Foam, Silicon (eg. Allevyn, etc) 07/26/24 1257   Dressing Changed Changed 07/26/24 1257   Patient Tolerance Tolerated well 07/26/24 1257   Dressing Status Clean;Dry;Intact 07/26/24 1257       Wound 07/19/24 Pressure Injury Buttocks Right (Active)   Wound Image   07/26/24 1257   Wound Description Palomas 07/26/24 1257   Miley-wound Assessment Hyperpigmented;Scar Tissue 07/26/24 1257   Wound Length (cm) 0.7 cm 07/26/24 1257   Wound Width (cm) 0.5 cm 07/26/24 1257   Wound Depth (cm) 0.1 cm 07/26/24 1257   Wound Surface Area (cm^2) 0.35 cm^2 07/26/24 1257   Wound Volume (cm^3) 0.035 cm^3 07/26/24 1257   Calculated Wound Volume (cm^3) 0.04 cm^3 07/26/24 1257   Change in Wound Size % 0 07/26/24 1257   Drainage Amount None 07/26/24 1257   Non-staged Wound Description Partial thickness 07/26/24 1257   Treatments Cleansed 07/26/24 1257   Dressing Foam, Silicon (eg. Allevyn, etc) 07/26/24 1257   Dressing Changed Changed 07/26/24 1257   Patient Tolerance Tolerated well 07/26/24 1257   Dressing Status Dry;Clean;Intact 07/26/24 1257         Frances TIJERINAN, RN, CWON

## 2024-07-26 NOTE — PROCEDURES
Vascular Surgery  Anamaria Parnell 79 y.o. female MRN: [id number]  Unit/Bed#: [address] 206-01 Encounter: [id number]    ISAAC Procedure Note    Date: 7/26/24  Time: 13:30    Location of wound: B/L groins    Sponges removed:  3 Black Sponges: 1 in each groin, 1 bridge  0 White Sponges    Dimensions of wound: Right: 7 cm x 3 cm x 1.4 cm   Left:  5 cm x 2.5 cm x 1.1 cm    Description of wound: B/L groin surgical incisions w/ scant bleeding        Sponges placed:  3 Black Sponges: 1 in each groin, 1 bridge  0 White Sponges    VAC settings:  125 mmHg  Continuous    Pt tolerated procedure well  VAC sticker placed to wound dressing, per protocol      GRACIELA Garcia  7/26/2024

## 2024-07-26 NOTE — ASSESSMENT & PLAN NOTE
Baseline 1.4-1.7  RICARDO likely due to sepsis / pre-renal.  Management per nephrology    Recent Labs     07/24/24  1939 07/25/24  0436 07/26/24  0437   BUN 25 30* 39*   CREATININE 1.41* 1.59* 1.92*   EGFR 35 31 24

## 2024-07-26 NOTE — PROGRESS NOTES
[facility]  Progress Note  Name: Anamaria Parnell I  MRN: [id number]  Unit/Bed#: [place] [address] [id number] I Date of Admission: 7/19/2024   Date of Service: 7/26/2024 I Hospital Day: 7    Assessment & Plan   * Gangrene (HCC)  Assessment & Plan  79-year-old female with history of PAD, CAD, diabetes, and hypertension who presented from the [facility] due to right great toe gangrene.  She was transferred here for vascular bypass. XR right foot showing cortical destruction along the ventral aspect of the first distal phalanx worrisome for acute osteomyelitis.     S/p Vascular surgery intervention 7/24/2024  DAPT: Aspirin Brillinta  Continue pravastatin  Patient has received a 10 day course in the ICU of cefazolin, given no surgical cure yet will have Infectious disease assess for further need for antibiotic treatment.     Aortoiliac occlusive disease (HCC)  Assessment & Plan  See plan as written above    Primary hypertension  Assessment & Plan  Controlled  Continue amlodipine, clonidine, metoprolol    Type 2 diabetes mellitus, without long-term current use of insulin (HCC)  Assessment & Plan  Lab Results   Component Value Date    HGBA1C 5.9 (H) 07/09/2024     Recent Labs     07/25/24  2118 07/26/24  0722 07/26/24  1115 07/26/24  1639   POCGLU 179* 159* 162* 134       Home regimen: Tradjenta  Inpatient regimen: Sliding scale      Depression, recurrent (HCC)  Assessment & Plan  Continue Lexapro    Acute renal failure superimposed on stage 4 chronic kidney disease (HCC)  Assessment & Plan  Baseline 1.4-1.7  RICARDO likely due to sepsis / pre-renal.  Management per nephrology    Recent Labs     07/24/24  1939 07/25/24  0436 07/26/24  0437   BUN 25 30* 39*   CREATININE 1.41* 1.59* 1.92*   EGFR 35 31 24               CVA (cerebral vascular accident) (HCC)  Assessment & Plan  History of CVA. MRI 7/2023 showing: Large area of acute to subacute ischemia left PCA distribution occipital lobe. Small foci of acute  to subacute ischemia in the left frontal and left parietal/temporal lobes MCA distribution.  Continue Aspirin and statin           VTE Pharmacologic Prophylaxis:   Pharmacologic: Heparin  Mechanical VTE Prophylaxis in Place: Yes    Discussions with Specialists or Other Care Team Provider: nursing    Education and Discussions with Family / Patient: patient    Current Length of Stay: 7 day(s)    Current Patient Status: Inpatient   Certification Statement: The patient will continue to require additional inpatient hospital stay due to vascular reeval    Discharge Plan: active    Code Status: Level 3 - DNAR and DNI      Subjective:   Patient seen and examined at bedside. She has no new complaints overnight.     Objective:     Vitals:   Temp (24hrs), Av.5 °F (36.4 °C), Min:97.3 °F (36.3 °C), Max:97.9 °F (36.6 °C)    Temp:  [97.3 °F (36.3 °C)-97.9 °F (36.6 °C)] 97.9 °F (36.6 °C)  HR:  [74-82] 80  Resp:  [16-18] 16  BP: (112-130)/(51-56) 112/56  SpO2:  [93 %-99 %] 95 %  Body mass index is 33.64 kg/m².     Input and Output Summary (last 24 hours):       Intake/Output Summary (Last 24 hours) at 2024 1752  Last data filed at 2024 1316  Gross per 24 hour   Intake 720 ml   Output 1950 ml   Net -1230 ml       Physical Exam:     Physical Exam  Vitals reviewed.   HENT:      Head: Normocephalic.      Nose: Nose normal.      Mouth/Throat:      Mouth: Mucous membranes are moist.   Eyes:      General: No scleral icterus.  Cardiovascular:      Rate and Rhythm: Normal rate and regular rhythm.   Pulmonary:      Effort: Pulmonary effort is normal. No respiratory distress.   Abdominal:      General: There is no distension.      Palpations: Abdomen is soft.      Tenderness: There is no abdominal tenderness.   Skin:     General: Skin is warm.   Neurological:      Mental Status: She is alert. Mental status is at baseline.   Psychiatric:         Mood and Affect: Mood normal.         Behavior: Behavior normal.       Additional  Data:     Labs:    Results from last 7 days   Lab Units 07/26/24 0437 07/25/24 0436 07/24/24  1939 07/23/24  0444 07/22/24  0541   WBC Thousand/uL 24.31*   < > 32.36*   < > 13.02*   HEMOGLOBIN g/dL 8.3*   < > 11.0*   < > 10.6*   I STAT HEMOGLOBIN   --   --   --    < >  --    HEMATOCRIT % 24.7*   < > 33.1*   < > 32.2*   HEMATOCRIT, ISTAT   --   --   --    < >  --    PLATELETS Thousands/uL 324   < > 293   < > 351   BANDS PCT %  --   --  2  --   --    SEGS PCT %  --   --   --   --  80*   LYMPHO PCT %  --   --  4*  --  6*   MONO PCT %  --   --  3*  --  9   EOS PCT %  --   --  1  --  2    < > = values in this interval not displayed.     Results from last 7 days   Lab Units 07/26/24 0437 07/25/24 0436 07/24/24 1939   SODIUM mmol/L 139   < > 141   POTASSIUM mmol/L 3.8   < > 5.3   CHLORIDE mmol/L 106   < > 114*   CO2 mmol/L 23   < > 17*   BUN mg/dL 39*   < > 25   CREATININE mg/dL 1.92*   < > 1.41*   ANION GAP mmol/L 10   < > 10   CALCIUM mg/dL 8.2*   < > 8.8   ALBUMIN g/dL  --   --  2.7*   TOTAL BILIRUBIN mg/dL  --   --  0.48   ALK PHOS U/L  --   --  74   ALT U/L  --   --  3*   AST U/L  --   --  15   GLUCOSE RANDOM mg/dL 183*   < > 228*    < > = values in this interval not displayed.     Results from last 7 days   Lab Units 07/24/24  1504   INR  1.51*     Results from last 7 days   Lab Units 07/26/24  1639 07/26/24  1115 07/26/24  0722 07/25/24  2118 07/25/24  1545 07/25/24  1035 07/25/24  0721 07/24/24  2138 07/24/24  0734 07/23/24  2103 07/23/24  1613 07/23/24  1132   POC GLUCOSE mg/dl 134 162* 159* 179* 165* 168* 162* 189* 138 148* 159* 128         Results from last 7 days   Lab Units 07/24/24  1939   LACTIC ACID mmol/L 1.5           * I Have Reviewed All Lab Data Listed Above.  * Additional Pertinent Lab Tests Reviewed: All Labs For Current Hospital Admission Reviewed    Mobility:  Basic Mobility Inpatient Raw Score: 11  [facility] Goal: 4: Move to chair/commode  [facility] Achieved: 5: Stand (1 or more minutes)    Lines:    Invasive Devices       Peripheral Intravenous Line  Duration             Peripheral IV 07/24/24 Right Forearm 2 days    Peripheral IV 07/25/24 Left Forearm 1 day              Drain  Duration             Urethral Catheter Latex 16 Fr. 2 days                       Imaging:    Imaging Reports Reviewed Today Include: imaging since admission    Recent Cultures (last 7 days):           Last 24 Hours Medication List:   Current Facility-Administered Medications   Medication Dose Route Frequency Provider Last Rate    acetaminophen  975 mg Oral Q8H [facility] Harvey Rey, DO      allopurinol  100 mg Oral Daily Harvey Rey, DO      amLODIPine  5 mg Oral Daily Harvey Rey, DO      aspirin  81 mg Oral Daily Harvey Rey, DO      [Transfer Hold] chlorhexidine  15 mL Mouth/Throat Q12H [facility] Hannah Baxter PA-C      cloNIDine  1 patch Transdermal Weekly Harvey Rey, DO      escitalopram  20 mg Oral Daily Harvey Rey, DO      heparin (porcine)  5,000 Units Subcutaneous Q8H [facility] Harvey Rey, DO      HYDROmorphone  0.5 mg Intravenous Q5 Min PRN Mp Nielsen, DO      HYDROmorphone  0.2 mg Intravenous Q6H PRN Harvey Rye, DO      insulin lispro  1-5 Units Subcutaneous TID  Harvey Rey, DO      lidocaine  1 patch Topical Daily Harvey Rey, DO      LORazepam  0.5 mg Oral Q8H PRN Harvey Rey, DO      metoprolol succinate  100 mg Oral Daily Harvey Rey, DO      ondansetron  4 mg Intravenous Q6H PRN Harvey Rey, DO      oxyCODONE  5 mg Oral Q6H PRN Harvey Rey, DO      pravastatin  80 mg Oral Daily With Dinner Harvey Rey, DO      sodium bicarbonate  650 mg Oral BID after meals Harvey Rey, DO      ticagrelor  90 mg Oral Q12H [facility] Harvey Rey, DO      traZODone  50 mg Oral HS Harvey Rey, DO          Today, Patient Was Seen By: Mp Perez MD    ** Please Note: Dictation voice to text software may have been used in the creation of this document. **

## 2024-07-26 NOTE — PROGRESS NOTES
NEPHROLOGY PROGRESS NOTE   Anamaria Parnell 79 y.o. female MRN: [id number]  Unit/Bed#: [place] [address] -01 Encounter: [id number]      ASSESSMENT & PLAN:  #1 RICARDO on top of CKD stage IIIb/IV.  Baseline creatinine around 1.4-1.7, follow-up with Dr. NAYE Longoria as an outpatient.  RICARDO in the setting of sepsis with soft tissue infection, creatinine on admission 2.1, function initially improving creatinine down to 1.4 range till 7/24.  Creatinine today slightly above baseline at 1.92 after recent vascular procedure.  Continue holding losartan.  Currently patient is off IV fluids.  Status post Lasix 40 mg IV x 2 yesterday  Avoid relative hypotension.  Follow daily labs.  37, okay to use Lasix as needed if urine output decreases    #2 right hallux gangrene on the patient with history of PAD,   Status post bilateral CFA with patch angioplasty, left JACI/EIA shockwave PTA/stenting and left to right femorofemoral bypass w/PTFE on 7/24   Postoperative management as per vascular surgery team    3 metabolic acidosis in the setting of CKD, improved, continue with bicarbonate tablets    #4 hypertension, blood pressure is stable, keep holding ARB for now    5 anemia, multifactorial in the setting of chronic kidney disease as well as acute blood loss anemia after recent endovascular procedure, status post blood transfusion reported received 3 unit PRBC IntraOp  Hemoglobin 8.3 this morning, monitor H&H and transfuse for hemoglobin less than 7  No DIO due to history of CVA and bladder cancer      SUBJECTIVE:  Seen and examined, pain this morning better controlled, denies any chest pain, no shortness of breath, no abdominal pain, no nausea vomiting    OBJECTIVE:  Current Weight: Weight - Scale: 73 kg (160 lb 15 oz)  Vitals:    07/26/24 0720   BP: 120/51   Pulse: 80   Resp: 18   Temp: (!) 97.3 °F (36.3 °C)   SpO2: 99%       Intake/Output Summary (Last 24 hours) at 7/26/2024 1035  Last data filed at 7/26/2024 0858  Gross per 24 hour   Intake 600  ml   Output 2600 ml   Net -2000 ml       General: conscious, cooperative, in not acute distress  Eyes: conjunctivae pink, anicteric sclerae  ENT: lips and mucous membranes moist  Neck: supple, no JVD  Chest: clear breath sounds bilateral, no crackles, ronchus or wheezings  CVS: distinct S1 & S2, normal rate, regular rhythm  Abdomen: soft, non-tender, non-distended, normoactive bowel sounds  Extremities: no significant edema of both legs, necrotic right first toe  Skin: no rash  Neuro: awake, alert, oriented        Medications:    Current Facility-Administered Medications:     acetaminophen (TYLENOL) tablet 975 mg, 975 mg, Oral, Q8H JAVIER, Harvey Rey DO, 975 mg at 07/26/24 0548    allopurinol (ZYLOPRIM) tablet 100 mg, 100 mg, Oral, Daily, Harvey Rey DO, 100 mg at 07/26/24 0810    amLODIPine (NORVASC) tablet 5 mg, 5 mg, Oral, Daily, Harvey Rey DO, 5 mg at 07/26/24 0810    aspirin chewable tablet 81 mg, 81 mg, Oral, Daily, Harvey Rey DO, 81 mg at 07/26/24 0810    [Transfer Hold] chlorhexidine (PERIDEX) 0.12 % oral rinse 15 mL, 15 mL, Mouth/Throat, Q12H JAVIER, Hannah Baxter PA-C, 15 mL at 07/25/24 0805    cloNIDine (CATAPRES-TTS-3) 0.3 mg/24 hr TD weekly patch, 1 patch, Transdermal, Weekly, Harvey Rey DO, 0.3 mg at 07/23/24 0825    escitalopram (LEXAPRO) tablet 20 mg, 20 mg, Oral, Daily, Harvey Rey DO, 20 mg at 07/26/24 0810    heparin (porcine) subcutaneous injection 5,000 Units, 5,000 Units, Subcutaneous, Q8H JAVIER, 5,000 Units at 07/26/24 0548 **AND** [CANCELED] Platelet count, , , Once, Harvey Rey DO    HYDROmorphone (DILAUDID) injection 0.5 mg, 0.5 mg, Intravenous, Q5 Min PRN, Mp Nielsen DO    HYDROmorphone HCl (DILAUDID) injection 0.2 mg, 0.2 mg, Intravenous, Q6H PRN, Harvey Rey DO    insulin lispro (HumALOG/ADMELOG) 100 units/mL subcutaneous injection 1-5 Units, 1-5 Units, Subcutaneous, TID AC, 1 Units at 07/26/24 0752 **AND** Fingerstick Glucose (POCT), , , TID AC, Harvey Rey, DO     lidocaine (LIDODERM) 5 % patch 1 patch, 1 patch, Topical, Daily, Harvey Rey DO, 1 patch at 07/26/24 0800    LORazepam (ATIVAN) tablet 0.5 mg, 0.5 mg, Oral, Q8H PRN, Harvey Rey DO, 0.5 mg at 07/23/24 1307    metoprolol succinate (TOPROL-XL) 24 hr tablet 100 mg, 100 mg, Oral, Daily, Harvey Rey DO, 100 mg at 07/26/24 0810    ondansetron (ZOFRAN) injection 4 mg, 4 mg, Intravenous, Q6H PRN, Harvey Rey DO    oxyCODONE (ROXICODONE) IR tablet 5 mg, 5 mg, Oral, Q6H PRN, Harvey Rey DO, 5 mg at 07/25/24 1329    pravastatin (PRAVACHOL) tablet 80 mg, 80 mg, Oral, Daily With Dinner, Harvey Rey DO, 80 mg at 07/25/24 1543    sodium bicarbonate tablet 650 mg, 650 mg, Oral, BID after meals, Harvey Rey DO, 650 mg at 07/26/24 0813    ticagrelor (BRILINTA) tablet 90 mg, 90 mg, Oral, Q12H JAVIER, Harvey Rey DO, 90 mg at 07/26/24 0810    traZODone (DESYREL) tablet 50 mg, 50 mg, Oral, HS, Harvey Rey DO, 50 mg at 07/25/24 2115    Invasive Devices:        Lab Results:   Results from last 7 days   Lab Units 07/26/24  0437 07/25/24  0436 07/24/24  1939 07/24/24  1551 07/24/24  1341 07/24/24  1037 07/24/24  0456 07/23/24  0444   WBC Thousand/uL 24.31* 25.83* 32.36*  --   --   --    < > 12.47*   HEMOGLOBIN g/dL 8.3* 9.4* 11.0*  --   --   --    < > 9.9*   I STAT HEMOGLOBIN g/dl  --   --   --  7.8* 8.2* 7.8*   < >  --    HEMATOCRIT % 24.7* 28.1* 33.1*  --   --   --    < > 30.3*   HEMATOCRIT, ISTAT %  --   --   --  23* 24* 23*   < >  --    PLATELETS Thousands/uL 324 310 293  --   --   --    < > 315   SODIUM mmol/L 139 139 141  --   --   --    < > 139   POTASSIUM mmol/L 3.8 4.5 5.3  --   --   --    < > 4.1   CHLORIDE mmol/L 106 112* 114*  --   --   --    < > 106   CO2 mmol/L 23 18* 17*  --   --   --    < > 24   CO2, I-STAT mmol/L  --   --   --  18* 20* 20*   < >  --    BUN mg/dL 39* 30* 25  --   --   --    < > 22   CREATININE mg/dL 1.92* 1.59* 1.41*  --   --   --    < > 1.43*   CALCIUM mg/dL 8.2* 8.2* 8.8  --   --   --    <  "> 8.4   MAGNESIUM mg/dL  --   --  1.6*  --   --   --   --   --    PHOSPHORUS mg/dL  --   --  6.4*  --   --   --   --   --    ALK PHOS U/L  --   --  74  --   --   --   --  108*   ALT U/L  --   --  3*  --   --   --   --  <3*   AST U/L  --   --  15  --   --   --   --  12*   GLUCOSE, ISTAT mg/dl  --   --   --  179* 186* 183*   < >  --     < > = values in this interval not displayed.       Previous work up:  See previous notes      Portions of the record may have been created with voice recognition software. Occasional wrong word or \"sound a like\" substitutions may have occurred due to the inherent limitations of voice recognition software. Read the chart carefully and recognize, using context, where substitutions have occurred.If you have any questions, please contact the dictating provider.  "

## 2024-07-26 NOTE — PROGRESS NOTES
Cardiology Progress Note - Anamaria Parnell 79 y.o. female MRN: [id number]    Unit/Bed#: [place] [address] -01 Encounter: [id number]      Assessment & Plan:    Gangrene (HCC)  -Presented with right great toe gangrene/osteomyelitis  -Underwent bilateral femoral endarterectomy with left to right femoral-femoral bypass and retrograde LIMA stenting and bilateral VAC placement on 7/24    Coronary artery disease of native artery of native heart with stable angina pectoris (HCC)  -TTE 7/21/2024 showed EF 41%, severe LAE, moderate MR, mild TR, estimated PA pressure 59 mmHg  -Nuclear pharmacologic stress test 8/18/2023 showed no evidence of inducible ischemia or scar, preserved LVEF  - LHC 2/26/2020 showed 50 to 60% mid LAD, D2 small vessels with 90% stenosis, moderate atherosclerosis of the LCx,  of the proximal RCA with left-to-right collaterals  - Continue with aspirin 81 mg daily Brilinta 90 mg twice daily    Aortoiliac occlusive disease (HCC)  -CTA with runoff 7/18/2024 showed marked soft plaque in the abdominal aorta, bilateral multifocal severe stenosis within the external iliac and common femoral arteries, long segment of the SFA occlusion extending from the ostial to the level of the popliteal artery  - Underwent femoral endarterectomy, bypass and stenting with vascular surgery on 7/24  - Continue with aspirin 81 mg daily and Brilinta 90 mg twice daily    Primary hypertension  -Currently on amlodipine 5 mg daily, clonidine 0.3 mg patch weekly, and Toprol- mg daily    Hyperlipidemia  - Continue with pravastatin 80 mg daily    Basilar artery stenosis    CVA (cerebral vascular accident) (HCC)  - Status post left TCAR in September 2023    Stage 4 chronic kidney disease (HCC)    Depression, recurrent (HCC)    Type 2 diabetes mellitus, without long-term current use of insulin (HCC)    Cellulitis of toe of right foot     Summary:  -Patient satting well on room air today  - Received furosemide 40 mg IV x 2 yesterday,  "now holding due to RICARDO  - Check daily standing weights  - Monitor creatinine and electrolytes closely  - Otherwise stable from cardiac standpoint  - Vascular surgery planning for right femoropopliteal bypass next Friday 8/2    Cardiology will follow peripherally.  Will see again towards the end of next week preoperatively for repeat cardiac risk assessment/optimization.      Subjective:   Patient was transferred out of ICU yesterday.  No significant events overnight.  She reports continued pain in her right foot, but otherwise is doing well.  Denies any chest pain, shortness of breath, palpitations, nausea, vomiting, fever, chills, headache or dizziness.    Objective:     Vitals: Blood pressure 120/51, pulse 80, temperature (!) 97.3 °F (36.3 °C), temperature source Oral, resp. rate 18, height 4' 10\" (1.473 m), weight 73 kg (160 lb 15 oz), SpO2 99%., Body mass index is 33.64 kg/m².,   Orthostatic Blood Pressures      Flowsheet Row Most Recent Value   Blood Pressure 120/51 filed at 07/26/2024 0720   Patient Position - Orthostatic VS Sitting filed at 07/26/2024 0720              Intake/Output Summary (Last 24 hours) at 7/26/2024 1345  Last data filed at 7/26/2024 1316  Gross per 24 hour   Intake 720 ml   Output 2325 ml   Net -1605 ml           Physical Exam:    GEN: Anamaria Parnell appears well, alert and oriented x 3, pleasant and cooperative   HEENT: Mucous membranes moist, no scleral icterus, no conjunctival pallor  NECK: + Trace JVD  HEART: Regular rate and rhythm, normal S1 and S2, no murmurs or rubs   LUNGS: Clear to auscultation bilaterally anteriorly  ABDOMEN: normal bowel sounds, soft, no tenderness, no distention  EXTREMITIES: peripheral pulses normal; no significant lower extremity edema, open wounds on bilateral upper legs, wound VAC being changed at the time of exam  NEURO: no focal findings   SKIN: Gangrenous right great toe wrapped in gauze        Current Facility-Administered Medications:     " acetaminophen (TYLENOL) tablet 975 mg, 975 mg, Oral, Q8H JAVIER, Harvey Rey DO, 975 mg at 07/26/24 1328    allopurinol (ZYLOPRIM) tablet 100 mg, 100 mg, Oral, Daily, Harvey Rey DO, 100 mg at 07/26/24 0810    amLODIPine (NORVASC) tablet 5 mg, 5 mg, Oral, Daily, Harvey Rey DO, 5 mg at 07/26/24 0810    aspirin chewable tablet 81 mg, 81 mg, Oral, Daily, Harvey Rey DO, 81 mg at 07/26/24 0810    [Transfer Hold] chlorhexidine (PERIDEX) 0.12 % oral rinse 15 mL, 15 mL, Mouth/Throat, Q12H JAVIER, Hannah Baxter PA-C, 15 mL at 07/25/24 0805    cloNIDine (CATAPRES-TTS-3) 0.3 mg/24 hr TD weekly patch, 1 patch, Transdermal, Weekly, Harvey Rey DO, 0.3 mg at 07/23/24 0825    escitalopram (LEXAPRO) tablet 20 mg, 20 mg, Oral, Daily, Harvey Rey DO, 20 mg at 07/26/24 0810    heparin (porcine) subcutaneous injection 5,000 Units, 5,000 Units, Subcutaneous, Q8H JAVIER, 5,000 Units at 07/26/24 1328 **AND** [CANCELED] Platelet count, , , Once, Harvey Rye DO    HYDROmorphone (DILAUDID) injection 0.5 mg, 0.5 mg, Intravenous, Q5 Min PRN, Mp Nielsen DO    HYDROmorphone HCl (DILAUDID) injection 0.2 mg, 0.2 mg, Intravenous, Q6H PRN, Harvey Rey DO    insulin lispro (HumALOG/ADMELOG) 100 units/mL subcutaneous injection 1-5 Units, 1-5 Units, Subcutaneous, TID AC, 1 Units at 07/26/24 1158 **AND** Fingerstick Glucose (POCT), , , TID AC, Harvey Rey DO    lidocaine (LIDODERM) 5 % patch 1 patch, 1 patch, Topical, Daily, Harvey Rey DO, 1 patch at 07/26/24 0800    LORazepam (ATIVAN) tablet 0.5 mg, 0.5 mg, Oral, Q8H PRN, Harvey Rey DO, 0.5 mg at 07/23/24 1307    metoprolol succinate (TOPROL-XL) 24 hr tablet 100 mg, 100 mg, Oral, Daily, Harvey Rey DO, 100 mg at 07/26/24 0810    ondansetron (ZOFRAN) injection 4 mg, 4 mg, Intravenous, Q6H PRN, Harvey Rey DO    oxyCODONE (ROXICODONE) IR tablet 5 mg, 5 mg, Oral, Q6H PRN, Harvey Rey DO, 5 mg at 07/26/24 1342    pravastatin (PRAVACHOL) tablet 80 mg, 80 mg, Oral, Daily  "With Dinner, Harvey Rey DO, 80 mg at 07/25/24 1543    sodium bicarbonate tablet 650 mg, 650 mg, Oral, BID after meals, Harvey Rey DO, 650 mg at 07/26/24 0813    ticagrelor (BRILINTA) tablet 90 mg, 90 mg, Oral, Q12H JAVIER, Harvey Rey DO, 90 mg at 07/26/24 0810    traZODone (DESYREL) tablet 50 mg, 50 mg, Oral, HS, Harvey Rey DO, 50 mg at 07/25/24 2115    Labs & Results:    No results found for: \"CKTOTAL\", \"CKMB\", \"CKMBINDEX\", \"TROPONINI\"    Lab Results   Component Value Date    GLUCOSE 179 (H) 07/24/2024    CALCIUM 8.2 (L) 07/26/2024    K 3.8 07/26/2024    CO2 23 07/26/2024     07/26/2024    BUN 39 (H) 07/26/2024    CREATININE 1.92 (H) 07/26/2024       Lab Results   Component Value Date    WBC 24.31 (H) 07/26/2024    HGB 8.3 (L) 07/26/2024    HCT 24.7 (L) 07/26/2024    MCV 90 07/26/2024     07/26/2024     Results from last 7 days   Lab Units 07/24/24  1504   INR  1.51*       No results found for: \"CHOL\"  Lab Results   Component Value Date    HDL 49 (L) 01/06/2024    HDL 52 09/03/2023     Lab Results   Component Value Date    LDLCALC 115 (H) 01/06/2024    LDLCALC 59 09/03/2023     Lab Results   Component Value Date    TRIG 161 (H) 01/06/2024    TRIG 164 (H) 09/03/2023       Lab Results   Component Value Date    ALT 3 (L) 07/24/2024    AST 15 07/24/2024    ALKPHOS 74 07/24/2024         EKG personally reviewed by )Dawson Almonte MD. No acute changes            " Standing/Walking

## 2024-07-26 NOTE — PLAN OF CARE
Problem: PAIN - ADULT  Goal: Verbalizes/displays adequate comfort level or baseline comfort level  Description: Interventions:  - Encourage patient to monitor pain and request assistance  - Assess pain using appropriate pain scale  - Administer analgesics based on type and severity of pain and evaluate response  - Implement non-pharmacological measures as appropriate and evaluate response  - Consider cultural and social influences on pain and pain management  - Notify physician/advanced practitioner if interventions unsuccessful or patient reports new pain  Outcome: Progressing     Problem: INFECTION - ADULT  Goal: Absence or prevention of progression during hospitalization  Description: INTERVENTIONS:  - Assess and monitor for signs and symptoms of infection  - Monitor lab/diagnostic results  - Monitor all insertion sites, i.e. indwelling lines, tubes, and drains  - Monitor endotracheal if appropriate and nasal secretions for changes in amount and color  - [place] appropriate cooling/warming therapies per order  - Administer medications as ordered  - Instruct and encourage patient and family to use good hand hygiene technique  - Identify and instruct in appropriate isolation precautions for identified infection/condition  Outcome: Progressing     Problem: SAFETY ADULT  Goal: Patient will remain free of falls  Description: INTERVENTIONS:  - Educate patient/family on patient safety including physical limitations  - Instruct patient to call for assistance with activity   - Consult OT/PT to assist with strengthening/mobility   - Keep Call bell within reach  - Keep bed low and locked with side rails adjusted as appropriate  - Keep care items and personal belongings within reach  - Initiate and maintain comfort rounds  - Make Fall Risk Sign visible to staff  - Offer Toileting every 2 Hours, in advance of need  - Initiate/Maintain bed alarm  - Apply yellow socks and bracelet for high fall risk patients  - Consider  moving patient to room near nurses station  Outcome: Progressing  Goal: Maintain or return to baseline ADL function  Description: INTERVENTIONS:  -  Assess patient's ability to carry out ADLs; assess patient's baseline for ADL function and identify physical deficits which impact ability to perform ADLs (bathing, care of mouth/teeth, toileting, grooming, dressing, etc.)  - Assess/evaluate cause of self-care deficits   - Assess range of motion  - Assess patient's mobility; develop plan if impaired  - Assess patient's need for assistive devices and provide as appropriate  - Encourage maximum independence but intervene and supervise when necessary  - Involve family in performance of ADLs  - Assess for home care needs following discharge   - Consider OT consult to assist with ADL evaluation and planning for discharge  - Provide patient education as appropriate  Outcome: Progressing  Goal: Maintains/Returns to pre admission functional level  Description: INTERVENTIONS:  - Perform AM-PAC 6 Click Basic Mobility/ Daily Activity assessment daily.  - Set and communicate daily mobility goal to care team and patient/family/caregiver.   - Collaborate with rehabilitation services on mobility goals if consulted  - Stand patient 3 times a day  - Ambulate patient 3 times a day  - Out of bed to chair 3 times a day   - Out of bed for meals 3 times a day  - Out of bed for toileting  - Record patient progress and toleration of activity level   Outcome: Progressing     Problem: DISCHARGE PLANNING  Goal: Discharge to home or other facility with appropriate resources  Description: INTERVENTIONS:  - Identify barriers to discharge w/patient and caregiver  - Arrange for needed discharge resources and transportation as appropriate  - Identify discharge learning needs (meds, wound care, etc.)  - Arrange for interpretive services to assist at discharge as needed  - Refer to Case Management Department for coordinating discharge planning if the  patient needs post-hospital services based on physician/advanced practitioner order or complex needs related to functional status, cognitive ability, or social support system  Outcome: Progressing     Problem: Knowledge Deficit  Goal: Patient/family/caregiver demonstrates understanding of disease process, treatment plan, medications, and discharge instructions  Description: Complete learning assessment and assess knowledge base.  Interventions:  - Provide teaching at level of understanding  - Provide teaching via preferred learning methods  Outcome: Progressing     Problem: CARDIOVASCULAR - ADULT  Goal: Maintains optimal cardiac output and hemodynamic stability  Description: INTERVENTIONS:  - Monitor I/O, vital signs and rhythm  - Monitor for S/S and trends of decreased cardiac output  - Administer and titrate ordered vasoactive medications to optimize hemodynamic stability  - Assess quality of pulses, skin color and temperature  - Assess for signs of decreased coronary artery perfusion  - Instruct patient to report change in severity of symptoms  Outcome: Progressing     Problem: METABOLIC, FLUID AND ELECTROLYTES - ADULT  Goal: Electrolytes maintained within normal limits  Description: INTERVENTIONS:  - Monitor labs and assess patient for signs and symptoms of electrolyte imbalances  - Administer electrolyte replacement as ordered  - Monitor response to electrolyte replacements, including repeat lab results as appropriate  - Instruct patient on fluid and nutrition as appropriate  Outcome: Progressing  Goal: Fluid balance maintained  Description: INTERVENTIONS:  - Monitor labs   - Monitor I/O and WT  - Instruct patient on fluid and nutrition as appropriate  - Assess for signs & symptoms of volume excess or deficit  Outcome: Progressing  Goal: Glucose maintained within target range  Description: INTERVENTIONS:  - Monitor Blood Glucose as ordered  - Assess for signs and symptoms of hyperglycemia and hypoglycemia  -  Administer ordered medications to maintain glucose within target range  - Assess nutritional intake and initiate nutrition service referral as needed  Outcome: Progressing     Problem: HEMATOLOGIC - ADULT  Goal: Maintains hematologic stability  Description: INTERVENTIONS  - Assess for signs and symptoms of bleeding or hemorrhage  - Monitor labs  - Administer supportive blood products/factors as ordered and appropriate  Outcome: Progressing     Problem: MUSCULOSKELETAL - ADULT  Goal: Maintain or return mobility to safest level of function  Description: INTERVENTIONS:  - Assess patient's ability to carry out ADLs; assess patient's baseline for ADL function and identify physical deficits which impact ability to perform ADLs (bathing, care of mouth/teeth, toileting, grooming, dressing, etc.)  - Assess/evaluate cause of self-care deficits   - Assess range of motion  - Assess patient's mobility  - Assess patient's need for assistive devices and provide as appropriate  - Encourage maximum independence but intervene and supervise when necessary  - Involve family in performance of ADLs  - Assess for home care needs following discharge   - Consider OT consult to assist with ADL evaluation and planning for discharge  - Provide patient education as appropriate  Outcome: Progressing  Goal: Maintain proper alignment of affected body part  Description: INTERVENTIONS:  - Support, maintain and protect limb and body alignment  - Provide patient/ family with appropriate education  Outcome: Progressing     Problem: Prexisting or High Potential for Compromised Skin Integrity  Goal: Skin integrity is maintained or improved  Description: INTERVENTIONS:  - Identify patients at risk for skin breakdown  - Assess and monitor skin integrity  - Assess and monitor nutrition and hydration status  - Monitor labs   - Assess for incontinence   - Turn and reposition patient  - Assist with mobility/ambulation  - Relieve pressure over bony  prominences  - Avoid friction and shearing  - Provide appropriate hygiene as needed including keeping skin clean and dry  - Evaluate need for skin moisturizer/barrier cream  - Collaborate with interdisciplinary team   - Patient/family teaching  - Consider wound care consult   Outcome: Progressing

## 2024-07-27 PROBLEM — I48.0 PAROXYSMAL ATRIAL FIBRILLATION (HCC): Status: ACTIVE | Noted: 2024-07-27

## 2024-07-27 LAB
ABO GROUP BLD BPU: NORMAL
ABO GROUP BLD BPU: NORMAL
ANION GAP SERPL CALCULATED.3IONS-SCNC: 10 MMOL/L (ref 4–13)
ATRIAL RATE: 102 BPM
ATRIAL RATE: 88 BPM
BASOPHILS # BLD AUTO: 0.04 THOUSANDS/ÂΜL (ref 0–0.1)
BASOPHILS NFR BLD AUTO: 0 % (ref 0–1)
BPU ID: NORMAL
BPU ID: NORMAL
BUN SERPL-MCNC: 38 MG/DL (ref 5–25)
CALCIUM SERPL-MCNC: 8.4 MG/DL (ref 8.4–10.2)
CHLORIDE SERPL-SCNC: 106 MMOL/L (ref 96–108)
CO2 SERPL-SCNC: 25 MMOL/L (ref 21–32)
CREAT SERPL-MCNC: 1.71 MG/DL (ref 0.6–1.3)
CROSSMATCH: NORMAL
CROSSMATCH: NORMAL
EOSINOPHIL # BLD AUTO: 0.01 THOUSAND/ÂΜL (ref 0–0.61)
EOSINOPHIL NFR BLD AUTO: 0 % (ref 0–6)
ERYTHROCYTE [DISTWIDTH] IN BLOOD BY AUTOMATED COUNT: 15 % (ref 11.6–15.1)
GFR SERPL CREATININE-BSD FRML MDRD: 28 ML/MIN/1.73SQ M
GLUCOSE SERPL-MCNC: 164 MG/DL (ref 65–140)
GLUCOSE SERPL-MCNC: 165 MG/DL (ref 65–140)
GLUCOSE SERPL-MCNC: 165 MG/DL (ref 65–140)
GLUCOSE SERPL-MCNC: 173 MG/DL (ref 65–140)
GLUCOSE SERPL-MCNC: 179 MG/DL (ref 65–140)
HCT VFR BLD AUTO: 24.6 % (ref 34.8–46.1)
HGB BLD-MCNC: 8.2 G/DL (ref 11.5–15.4)
IMM GRANULOCYTES # BLD AUTO: >0.5 THOUSAND/UL (ref 0–0.2)
IMM GRANULOCYTES NFR BLD AUTO: 6 % (ref 0–2)
LYMPHOCYTES # BLD AUTO: 1.27 THOUSANDS/ÂΜL (ref 0.6–4.47)
LYMPHOCYTES NFR BLD AUTO: 5 % (ref 14–44)
MAGNESIUM SERPL-MCNC: 2.2 MG/DL (ref 1.9–2.7)
MCH RBC QN AUTO: 30.9 PG (ref 26.8–34.3)
MCHC RBC AUTO-ENTMCNC: 33.3 G/DL (ref 31.4–37.4)
MCV RBC AUTO: 93 FL (ref 82–98)
MONOCYTES # BLD AUTO: 1.65 THOUSAND/ÂΜL (ref 0.17–1.22)
MONOCYTES NFR BLD AUTO: 7 % (ref 4–12)
NEUTROPHILS # BLD AUTO: 20.14 THOUSANDS/ÂΜL (ref 1.85–7.62)
NEUTS SEG NFR BLD AUTO: 82 % (ref 43–75)
NRBC BLD AUTO-RTO: 2 /100 WBCS
PLATELET # BLD AUTO: 307 THOUSANDS/UL (ref 149–390)
PMV BLD AUTO: 10.3 FL (ref 8.9–12.7)
POTASSIUM SERPL-SCNC: 4.1 MMOL/L (ref 3.5–5.3)
QRS AXIS: -12 DEGREES
QRS AXIS: -19 DEGREES
QRSD INTERVAL: 152 MS
QRSD INTERVAL: 152 MS
QT INTERVAL: 358 MS
QT INTERVAL: 406 MS
QTC INTERVAL: 473 MS
QTC INTERVAL: 507 MS
RBC # BLD AUTO: 2.65 MILLION/UL (ref 3.81–5.12)
SODIUM SERPL-SCNC: 141 MMOL/L (ref 135–147)
T WAVE AXIS: 174 DEGREES
T WAVE AXIS: 184 DEGREES
TSH SERPL DL<=0.05 MIU/L-ACNC: 2.31 UIU/ML (ref 0.45–4.5)
UNIT DISPENSE STATUS: NORMAL
UNIT DISPENSE STATUS: NORMAL
UNIT PRODUCT CODE: NORMAL
UNIT PRODUCT CODE: NORMAL
UNIT PRODUCT VOLUME: 300 ML
UNIT PRODUCT VOLUME: 350 ML
UNIT RH: NORMAL
UNIT RH: NORMAL
VENTRICULAR RATE: 105 BPM
VENTRICULAR RATE: 94 BPM
WBC # BLD AUTO: 24.68 THOUSAND/UL (ref 4.31–10.16)

## 2024-07-27 PROCEDURE — 83735 ASSAY OF MAGNESIUM: CPT | Performed by: STUDENT IN AN ORGANIZED HEALTH CARE EDUCATION/TRAINING PROGRAM

## 2024-07-27 PROCEDURE — 85027 COMPLETE CBC AUTOMATED: CPT | Performed by: STUDENT IN AN ORGANIZED HEALTH CARE EDUCATION/TRAINING PROGRAM

## 2024-07-27 PROCEDURE — 84443 ASSAY THYROID STIM HORMONE: CPT | Performed by: PHYSICIAN ASSISTANT

## 2024-07-27 PROCEDURE — 80048 BASIC METABOLIC PNL TOTAL CA: CPT | Performed by: STUDENT IN AN ORGANIZED HEALTH CARE EDUCATION/TRAINING PROGRAM

## 2024-07-27 PROCEDURE — 99024 POSTOP FOLLOW-UP VISIT: CPT | Performed by: NURSE PRACTITIONER

## 2024-07-27 PROCEDURE — 99232 SBSQ HOSP IP/OBS MODERATE 35: CPT | Performed by: STUDENT IN AN ORGANIZED HEALTH CARE EDUCATION/TRAINING PROGRAM

## 2024-07-27 PROCEDURE — 99233 SBSQ HOSP IP/OBS HIGH 50: CPT | Performed by: PODIATRIST

## 2024-07-27 PROCEDURE — 93010 ELECTROCARDIOGRAM REPORT: CPT | Performed by: STUDENT IN AN ORGANIZED HEALTH CARE EDUCATION/TRAINING PROGRAM

## 2024-07-27 PROCEDURE — 82948 REAGENT STRIP/BLOOD GLUCOSE: CPT

## 2024-07-27 PROCEDURE — 99233 SBSQ HOSP IP/OBS HIGH 50: CPT | Performed by: STUDENT IN AN ORGANIZED HEALTH CARE EDUCATION/TRAINING PROGRAM

## 2024-07-27 RX ORDER — TORSEMIDE 10 MG/1
10 TABLET ORAL DAILY
Status: DISCONTINUED | OUTPATIENT
Start: 2024-07-27 | End: 2024-08-09 | Stop reason: HOSPADM

## 2024-07-27 RX ADMIN — ACETAMINOPHEN 975 MG: 325 TABLET, FILM COATED ORAL at 05:09

## 2024-07-27 RX ADMIN — TORSEMIDE 10 MG: 10 TABLET ORAL at 17:54

## 2024-07-27 RX ADMIN — LIDOCAINE 5% 1 PATCH: 700 PATCH TOPICAL at 08:53

## 2024-07-27 RX ADMIN — INSULIN LISPRO 1 UNITS: 100 INJECTION, SOLUTION INTRAVENOUS; SUBCUTANEOUS at 17:56

## 2024-07-27 RX ADMIN — ALLOPURINOL 100 MG: 100 TABLET ORAL at 08:53

## 2024-07-27 RX ADMIN — SODIUM BICARBONATE 650 MG TABLET 650 MG: at 17:54

## 2024-07-27 RX ADMIN — PRAVASTATIN SODIUM 80 MG: 80 TABLET ORAL at 17:54

## 2024-07-27 RX ADMIN — TRAZODONE HYDROCHLORIDE 50 MG: 50 TABLET ORAL at 22:04

## 2024-07-27 RX ADMIN — INSULIN LISPRO 1 UNITS: 100 INJECTION, SOLUTION INTRAVENOUS; SUBCUTANEOUS at 13:02

## 2024-07-27 RX ADMIN — INSULIN LISPRO 1 UNITS: 100 INJECTION, SOLUTION INTRAVENOUS; SUBCUTANEOUS at 08:53

## 2024-07-27 RX ADMIN — ACETAMINOPHEN 975 MG: 325 TABLET, FILM COATED ORAL at 13:01

## 2024-07-27 RX ADMIN — HEPARIN SODIUM 5000 UNITS: 5000 INJECTION INTRAVENOUS; SUBCUTANEOUS at 22:04

## 2024-07-27 RX ADMIN — AMLODIPINE BESYLATE 5 MG: 5 TABLET ORAL at 08:53

## 2024-07-27 RX ADMIN — ACETAMINOPHEN 975 MG: 325 TABLET, FILM COATED ORAL at 22:04

## 2024-07-27 RX ADMIN — ESCITALOPRAM OXALATE 20 MG: 20 TABLET ORAL at 08:53

## 2024-07-27 RX ADMIN — HEPARIN SODIUM 5000 UNITS: 5000 INJECTION INTRAVENOUS; SUBCUTANEOUS at 13:02

## 2024-07-27 RX ADMIN — ASPIRIN 81 MG CHEWABLE TABLET 81 MG: 81 TABLET CHEWABLE at 08:53

## 2024-07-27 RX ADMIN — METOPROLOL SUCCINATE 100 MG: 50 TABLET, EXTENDED RELEASE ORAL at 08:53

## 2024-07-27 RX ADMIN — SODIUM BICARBONATE 650 MG TABLET 650 MG: at 08:53

## 2024-07-27 RX ADMIN — TICAGRELOR 90 MG: 90 TABLET ORAL at 22:04

## 2024-07-27 RX ADMIN — HEPARIN SODIUM 5000 UNITS: 5000 INJECTION INTRAVENOUS; SUBCUTANEOUS at 05:09

## 2024-07-27 RX ADMIN — TICAGRELOR 90 MG: 90 TABLET ORAL at 08:53

## 2024-07-27 NOTE — PROGRESS NOTES
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 Pt seen in consult by nephrology who continues to follow pt for RICARDO on CKD. Pt was transferred to [facility] on 7/19 w/ plans for R femoral endarterectomy w/ retrograde stenting and RLE bypass. Pt seen in consult by podiatry for R hallux dry gangrene. Xray R foot (+) OM. Podiatry plans local wound care w/ re-eval after revascularization.    Vascular surgery consulted for possible revascularization options for known AIOD/PAD w/ CLTI and R hallux dry gangrene. Pt follows w/ vascular surgery for PAD and chronic limb ischemia; last seen in the office on 6/4/24. Imaging shows extensive AIOD and peripheral arterial disease with occlusion of the R iliac system, nearly occlusive R CFA, and occlusion of popliteal artery with recon of the below knee popliteal and tibials. R LISA: 0.11/-/-.     Pt is S/P B/L CFAE, L JACI/EIA balloon angio and shockwave lithotripsy w/ insertion of a L EIA drug-coated stent and L JACI VBX stent, L to R PTFE fem-fem bypass, and B/L groin vac placement on 7/24. Pt received 3U PRBC and 1U FFP intra-op. Plan to return to OR for R femoral to BK-pop bypass; tentatively scheduled for 8/2/24.    Diagnostics:  -7/22/24 Xray R foot: Cortical destruction along the ventral aspect of the first distal phalanx worrisome for acute osteomyelitis. Large air within the overlying soft tissues.   -7/21/24 Echo: Left ventricular cavity size is  normal. Wall thickness is severely increased. The left ventricular ejection fraction is 41% by biplane measurement. Systolic function is mildly reduced. Global longitudinal strain is reduced at -10% with moderate to severe strain reduction in the ED mid to basal anteroseptal septal and inferior walls.. Unable to grade diastolic dysfunction given the severe degree of mitral annular calcification.   -7/18/24 LE vein mapping: Right: The great saphenous vein is patent  from the groin to the ankle. The intraluminal diameter measurements range from 2.2mm to 6.4mm throughout. Left: The great saphenous vein is patent from the groin to the ankle. The intraluminal diameter measurements range from 1.3mm to 6.8mm throughout.  -7/18/24 CTA abd w/ runoff: Visualized descending thoracic and suprarenal abdominal aorta demonstrate marked soft plaque with multifocal regions of plaque ulceration. A point of relative stenosis within the infrarenal abdominal aorta measures up to 7 mm in diameter. Bilateral multifocal severe stenosis within the external iliac and common femoral arteries. Bilateral long segment SFA occlusion extending from the ostia to the level of the P1 popliteal artery. Three-vessel runoff on the right, the posterior tibial artery is dominant. Two-vessel runoff on the left, the peroneal artery is dominant. Segmental visualization of the posterior tibial artery. Interval increase in size of an exophytic lesion arising medially from the stomach, again consistent with gastrointestinal stromal tumor. New dilation of the pancreatic duct within the pancreatic head, no obvious pancreatic/ampulla lesion identified.  -7/16/24 LEAD: Right: Severe diffuse disease noted throughout the femoral-popliteal arteries with high grade stenosis vs occlusion of the proximal-distal superficial femoral artery with reconstitution to the popliteal artery. Evidence suggestive of TIb/Peroneal occlusive disease. There is a high grade stenosis vs  occlusion of the distal anterior tibial artery. R LISA: 0.11/-/-. Left: Severe diffuse disease noted throughout the femoral-popliteal arteries with high grade stenosis vs occlusion of the proximal-mid superficial femoral artery with reconstitution in the distal SFA. Evidence suggestive of TIb/Peroneal occlusive disease. There is high grade stenosis vs. occlusion of the entire posterior tibial artery. L LISA: 0.31/20/19.    Plan:   -Advanced calcific atherosclerotic AIOD/PAD w/ CTLI and R hallux dry gangrene (+) OM   -S/P B/L CFAE, L JACI/EIA balloon angio and shockwave lithotripsy w/ insertion of a L EIA drug-coated stent and L JACI VBX stent, L to R PTFE fem-fem bypass, and B/L groin vac placement on 7/24 (POD #3)  -B/L groin wound vac intact w/ adequate suction  -BL DP/PT signals  -Plan for femoral to BK pop bypass in OR; scheduled for Fri (8/2)  -Pt completed a 10-day course of IV ancef on 7/25; however, WBC remains elevated w/ no surgical cure  -ID consult pending to eval for possible further need for ABX  -Podiatry following w/ plans to continue local wound care for now w/ re-eval after revascularization; input appreciated  -Pt transiently tachycardic overnight w/ ; resolved without intervention  -Cardiology following peripherally and will re-eval pt later next week for repeat cardiac risk assessment/optimization prior to planned OR on Friday; input appreciated  -ABLA secondary to recent surgery SP 3U PRBC and 1U FFP intra-op; Hgb stabilized at 8.2 today  -Continue to monitor Hgb and transfuse < 7.0 per primary team  -Continue ASA, brilinta and pravastatin for medical management  -Okay to discontinue huffman cath from vascular surgery standpoint  -Nephrology following for RICARDO on CKD; input appreciated  -Continue medical management per primary team  -PT/OT consult  -Will discuss w/ Dr. Peterson      Subjective:  Pt seen for exam while sitting upright in bed; NAD. Pt reports being uncomfortable in bed, but denies  "any further complaints at this time.    Vitals:  /61 (BP Location: Left arm)   Pulse 85   Temp (!) 97.1 °F (36.2 °C) (Oral)   Resp 18   Ht 4' 10\" (1.473 m)   Wt 73 kg (160 lb 15 oz)   LMP  (LMP Unknown)   SpO2 96%   BMI 33.64 kg/m²     I/Os:  I/O last 3 completed shifts:  In: 1440 [P.O.:1440]  Out: 2725 [Urine:2225; Drains:500]  No intake/output data recorded.    Lab Results and Cultures:   Lab Results   Component Value Date    WBC 24.68 (H) 07/27/2024    HGB 8.2 (L) 07/27/2024    HCT 24.6 (L) 07/27/2024    MCV 93 07/27/2024     07/27/2024     Lab Results   Component Value Date    GLUCOSE 179 (H) 07/24/2024    CALCIUM 8.4 07/27/2024    K 4.1 07/27/2024    CO2 25 07/27/2024     07/27/2024    BUN 38 (H) 07/27/2024    CREATININE 1.71 (H) 07/27/2024     Lab Results   Component Value Date    INR 1.51 (H) 07/24/2024    INR 1.17 07/16/2024    INR 0.92 04/18/2024    PROTIME 18.4 (H) 07/24/2024    PROTIME 15.6 (H) 07/16/2024    PROTIME 12.9 04/18/2024     Blood Culture:   Lab Results   Component Value Date    BLOODCX No Growth After 5 Days. 07/17/2024     Urinalysis:   Lab Results   Component Value Date    COLORU Light Yellow 07/17/2024    CLARITYU Clear 07/17/2024    SPECGRAV 1.016 07/17/2024    PHUR 5.5 07/17/2024    LEUKOCYTESUR Moderate (A) 07/17/2024    NITRITE Negative 07/17/2024    GLUCOSEU Negative 07/17/2024    KETONESU Negative 07/17/2024    BILIRUBINUR Negative 07/17/2024    BLOODU Negative 07/17/2024     Urine Culture:   Lab Results   Component Value Date    URINECX (A) 07/17/2024     >100,000 cfu/ml - Lactobacillus gasseri Lactobacillus species    URINECX <10,000 cfu/ml Micrococcus luteus (A) 07/17/2024    URINECX (A) 07/17/2024     <10,000 cfu/ml - Lactobacillus rhamnosus Lactobacillus species       Medications:  Current Facility-Administered Medications   Medication Dose Route Frequency    acetaminophen (TYLENOL) tablet 975 mg  975 mg Oral Q8H JAVIER    allopurinol (ZYLOPRIM) tablet 100 " mg  100 mg Oral Daily    amLODIPine (NORVASC) tablet 5 mg  5 mg Oral Daily    aspirin chewable tablet 81 mg  81 mg Oral Daily    [Transfer Hold] chlorhexidine (PERIDEX) 0.12 % oral rinse 15 mL  15 mL Mouth/Throat Q12H JAVIER    cloNIDine (CATAPRES-TTS-3) 0.3 mg/24 hr TD weekly patch  1 patch Transdermal Weekly    escitalopram (LEXAPRO) tablet 20 mg  20 mg Oral Daily    heparin (porcine) subcutaneous injection 5,000 Units  5,000 Units Subcutaneous Q8H JAVIER    HYDROmorphone (DILAUDID) injection 0.5 mg  0.5 mg Intravenous Q5 Min PRN    HYDROmorphone HCl (DILAUDID) injection 0.2 mg  0.2 mg Intravenous Q6H PRN    insulin lispro (HumALOG/ADMELOG) 100 units/mL subcutaneous injection 1-5 Units  1-5 Units Subcutaneous TID AC    lidocaine (LIDODERM) 5 % patch 1 patch  1 patch Topical Daily    LORazepam (ATIVAN) tablet 0.5 mg  0.5 mg Oral Q8H PRN    metoprolol succinate (TOPROL-XL) 24 hr tablet 100 mg  100 mg Oral Daily    ondansetron (ZOFRAN) injection 4 mg  4 mg Intravenous Q6H PRN    oxyCODONE (ROXICODONE) IR tablet 5 mg  5 mg Oral Q6H PRN    pravastatin (PRAVACHOL) tablet 80 mg  80 mg Oral Daily With Dinner    sodium bicarbonate tablet 650 mg  650 mg Oral BID after meals    ticagrelor (BRILINTA) tablet 90 mg  90 mg Oral Q12H JAVIER    traZODone (DESYREL) tablet 50 mg  50 mg Oral HS       Imaging:  See above    Physical Exam:    General appearance: alert and oriented, in no acute distress; periods of confusion  Skin:  skin color, texture, turgor normal; (+) R hallux gangrene  Neurologic: Grossly normal  Head: Normocephalic, without obvious abnormality, atraumatic  Lungs: clear to auscultation bilaterally  Heart: regular rate and rhythm  Abdomen:  soft, non-tender  Extremities:  extremities normal, warm, (+) motor/sensation, trace R foot edema    Wound/Incision:  R hallux gangrene w/ dressing clean, dry and intact.    Pulse exam:  DP: Right: doppler signal Left: doppler signal  PT: Right: doppler signal Left: doppler  signal      GRACIELA Garcia  7/27/2024

## 2024-07-27 NOTE — PROGRESS NOTES
Former[facility]  Progress Note  Name: Anamaria Parnell I  MRN: [id number]  Unit/Bed#: [place] [address] MS Sheila I Date of Admission: 7/19/2024   Date of Service: 7/27/2024 I Hospital Day: 8    Assessment & Plan   * Gangrene (HCC)  Assessment & Plan  79-year-old female with history of PAD, CAD, diabetes, and hypertension who presented from the [facility] due to right great toe gangrene.  She was transferred here for vascular bypass. XR right foot showing cortical destruction along the ventral aspect of the first distal phalanx worrisome for acute osteomyelitis.     S/p Vascular surgery intervention 7/24/2024  Continue Aspirin Brillinta pravastatin  Patient has received a 10 day course in the ICU of cefazolin.  Infectious disease recommends monitoring off antibiotics for now  For amputation by podiatry after revascularization has been completed by vascular surgery (Friday)    Aortoiliac occlusive disease (HCC)  Assessment & Plan  See plan as written above    Coronary artery disease of native artery of native heart with stable angina pectoris (HCC)  Assessment & Plan  CAD with history of PCI.  No chest pain.  On DAPT  Seen in consultation with cardiology for preoperative risk assessment    Primary hypertension  Assessment & Plan  Controlled  Continue amlodipine, clonidine, metoprolol    Type 2 diabetes mellitus, without long-term current use of insulin (HCC)  Assessment & Plan  Lab Results   Component Value Date    HGBA1C 5.9 (H) 07/09/2024     Recent Labs     07/26/24  1639 07/26/24  2107 07/27/24  0731 07/27/24  1152   POCGLU 134 185* 165* 164*       Home regimen: Tradjenta  Inpatient regimen: Sliding scale      Depression, recurrent (HCC)  Assessment & Plan  Continue Lexapro    Acute renal failure superimposed on stage 4 chronic kidney disease (HCC)  Assessment & Plan  Baseline 1.4-1.7  RICARDO likely due to sepsis / pre-renal.  Management per nephrology    Recent Labs     07/25/24  0436 07/26/24  0437  24  0501   BUN 30* 39* 38*   CREATININE 1.59* 1.92* 1.71*   EGFR 31 24 28                 CVA (cerebral vascular accident) (HCC)  Assessment & Plan  History of CVA. MRI 2023 showing: Large area of acute to subacute ischemia left PCA distribution occipital lobe. Small foci of acute to subacute ischemia in the left frontal and left parietal/temporal lobes MCA distribution.  Continue Aspirin and statin    Basilar artery stenosis  Assessment & Plan  history of carotid surgery.    Follows with Dr. Gallo           VTE Pharmacologic Prophylaxis:   Pharmacologic: Heparin  Mechanical VTE Prophylaxis in Place: Yes    Discussions with Specialists or Other Care Team Provider: vascular, podiatry, cardiology    Education and Discussions with Family / Patient: patientAustin    Current Length of Stay: 8 day(s)    Current Patient Status: Inpatient   Certification Statement: The patient will continue to require additional inpatient hospital stay due to carsd reeval, vascular evaluation, podiatry amputation    Discharge Plan: next week    Code Status: Level 3 - DNAR and DNI      Subjective:   Patient seen and examined at bedside. Had an episode of afib with RVR overnight. Currently without any complaints.    Objective:     Vitals:   Temp (24hrs), Av.2 °F (36.2 °C), Min:97.1 °F (36.2 °C), Max:97.3 °F (36.3 °C)    Temp:  [97.1 °F (36.2 °C)-97.3 °F (36.3 °C)] 97.2 °F (36.2 °C)  HR:  [] 92  Resp:  [16-18] 16  BP: (106-131)/(57-78) 113/66  SpO2:  [84 %-96 %] 84 %  Body mass index is 33.64 kg/m².     Input and Output Summary (last 24 hours):       Intake/Output Summary (Last 24 hours) at 2024 1538  Last data filed at 2024 1000  Gross per 24 hour   Intake 980 ml   Output 925 ml   Net 55 ml       Physical Exam:     Physical Exam  Vitals reviewed.   Constitutional:       General: She is not in acute distress.  HENT:      Head: Normocephalic.      Nose: Nose normal.      Mouth/Throat:      Mouth: Mucous membranes  are moist.   Eyes:      General: No scleral icterus.  Cardiovascular:      Rate and Rhythm: Normal rate.   Pulmonary:      Effort: Pulmonary effort is normal. No respiratory distress.   Abdominal:      Palpations: Abdomen is soft.      Tenderness: There is no abdominal tenderness.   Skin:     General: Skin is warm.   Neurological:      Mental Status: She is alert. Mental status is at baseline.   Psychiatric:         Mood and Affect: Mood normal.         Behavior: Behavior normal.       Additional Data:     Labs:    Results from last 7 days   Lab Units 07/27/24  0501 07/25/24  0436 07/24/24  1939   WBC Thousand/uL 24.68*   < > 32.36*   HEMOGLOBIN g/dL 8.2*   < > 11.0*   HEMATOCRIT % 24.6*   < > 33.1*   PLATELETS Thousands/uL 307   < > 293   BANDS PCT %  --   --  2   SEGS PCT % 82*  --   --    LYMPHO PCT % 5*  --  4*   MONO PCT % 7  --  3*   EOS PCT % 0  --  1    < > = values in this interval not displayed.     Results from last 7 days   Lab Units 07/27/24  0501 07/25/24  0436 07/24/24  1939   SODIUM mmol/L 141   < > 141   POTASSIUM mmol/L 4.1   < > 5.3   CHLORIDE mmol/L 106   < > 114*   CO2 mmol/L 25   < > 17*   BUN mg/dL 38*   < > 25   CREATININE mg/dL 1.71*   < > 1.41*   ANION GAP mmol/L 10   < > 10   CALCIUM mg/dL 8.4   < > 8.8   ALBUMIN g/dL  --   --  2.7*   TOTAL BILIRUBIN mg/dL  --   --  0.48   ALK PHOS U/L  --   --  74   ALT U/L  --   --  3*   AST U/L  --   --  15   GLUCOSE RANDOM mg/dL 165*   < > 228*    < > = values in this interval not displayed.     Results from last 7 days   Lab Units 07/24/24  1504   INR  1.51*     Results from last 7 days   Lab Units 07/27/24  1152 07/27/24  0731 07/26/24  2107 07/26/24  1639 07/26/24  1115 07/26/24  0722 07/25/24  2118 07/25/24  1545 07/25/24  1035 07/25/24  0721 07/24/24  2138 07/24/24  0734   POC GLUCOSE mg/dl 164* 165* 185* 134 162* 159* 179* 165* 168* 162* 189* 138         Results from last 7 days   Lab Units 07/24/24  1939   LACTIC ACID mmol/L 1.5           * I  Have Reviewed All Lab Data Listed Above.  * Additional Pertinent Lab Tests Reviewed: All Labs For Current Hospital Admission Reviewed    Mobility:  Basic Mobility Inpatient Raw Score: 11  -[facility] Goal: 4: Move to chair/commode  -[facility] Achieved: 4: Move to chair/commode    Lines:   Invasive Devices       Peripheral Intravenous Line  Duration             Peripheral IV 07/25/24 Left Forearm 2 days    Peripheral IV 07/27/24 Right;Ventral (anterior) Forearm <1 day                       Imaging:    Imaging Reports Reviewed Today Include: no new imaging    Recent Cultures (last 7 days):           Last 24 Hours Medication List:   Current Facility-Administered Medications   Medication Dose Route Frequency Provider Last Rate    acetaminophen  975 mg Oral Q8H [facility] Harvey Rey, DO      allopurinol  100 mg Oral Daily Harvey Rey, DO      aspirin  81 mg Oral Daily Harvey Rey, DO      [Transfer Hold] chlorhexidine  15 mL Mouth/Throat Q12H [facility] Hannah Baxter PA-C      cloNIDine  1 patch Transdermal Weekly Harvey Rey, DO      escitalopram  20 mg Oral Daily Harvey Rey, DO      heparin (porcine)  5,000 Units Subcutaneous Q8H [facility] Harvey Rey, DO      HYDROmorphone  0.5 mg Intravenous Q5 Min PRN Mp Nielsen, DO      HYDROmorphone  0.2 mg Intravenous Q6H PRN Harvey Rey, DO      insulin lispro  1-5 Units Subcutaneous TID  Harvey Rey, DO      lidocaine  1 patch Topical Daily Harvey Rey, DO      LORazepam  0.5 mg Oral Q8H PRN Harvey Rey, DO      metoprolol succinate  100 mg Oral Daily Harvey Rey, DO      ondansetron  4 mg Intravenous Q6H PRN Harvey Rey, DO      oxyCODONE  5 mg Oral Q6H PRN Harvey Rey, DO      pravastatin  80 mg Oral Daily With Dinner Harvey Rey, DO      sodium bicarbonate  650 mg Oral BID after meals Harvey Rey, DO      ticagrelor  90 mg Oral Q12H [facility] Harvey Rey, DO      torsemide  10 mg Oral Daily Joselyn Reyes Bahamonde, MD      traZODone  50 mg Oral HS Harvey Rey, DO          Today,  Patient Was Seen By: Mp Perez MD    ** Please Note: Dictation voice to text software may have been used in the creation of this document. **

## 2024-07-27 NOTE — QUICK NOTE
Became tachycardiac but no cp/sob/dizziness.  S/p 10 days antibiosis w/iv ancef w/o fever or new s/sx of acute infection slowly improving but elevated wbcs in pt w/pad w/recent vascular intervention  EKG concerning for afib w/rvr but improved rate control w/o intervention.  F/u formal read of EKG monitor on telemetry, recent echo reviewed w/severe LAE and moderate regurgitation likely culprits as well as ongoing  gangrene/arterial disease, etc.  If develops tachycardia again consider cardiology consultation for assistance in rate control.  Given rate controlled w/o intervention and hx of 1st degree avb and LBBB (presently more intraventricular) defer initiation of beta blockade.

## 2024-07-27 NOTE — CONSULTS
Consultation - Infectious Disease   Anamaria Parnell 79 y.o. female MRN: [id number]  Unit/Bed#: [place] [address] -01 Encounter: [id number]      IMPRESSION & RECOMMENDATIONS:   Impression/Recommendations:  1.  Leukocytosis. Initial acute elevation in WBC count immediately postoperatively on 7/24 and slowly trended down.  Now back up to 36, suspected secondary to source control issue in the setting of #2.  Fortunately patient remains afebrile and otherwise clinically stable, nontoxic in appearance.    -Antibiotic plan as below  -Recheck CBC in AM  -Follow temperatures and hemodynamics closely    2.  Right hallux dry gangrene.  In the setting of PAD with chronic limb ischemia. Patient received 10-day course of cefazolin.  Foot was reevaluated by podiatry with some minor localized erythema at the site but no fluctuance, crepitus or purulence.  However given worsening leukocytosis and concern for source control issue, patient will be going for amputation today.    -Start renally adjusted cefazolin/Flagyl preoperatively  -Podiatry input noted with plan for surgical intervention later today.  Will follow-up operative findings.    3.  PAD.  Status post B CFA endarterectomies/profundaplasty with bovine pericardial patches, Left JACI/EIA POBA/Sockwave IVL/stenting x 2; Left to right femoral to femoral bypass on 7/24.    -Ongoing vascular surgery follow-up  -Plan for right Vick BK pop bypass on Friday noted.    4.  DM 2, without long-term insulin use.  Recent hemoglobin A1c was 5.9.    5.  Acute kidney injury, on CKD 3.  Nephrology input noted.  Creatinine is back to baseline.    -Recheck BMP in AM  -Nephrology follow-up ongoing      Antibiotics:  Off antibiotic D4      I discussed above plan with podiatry service, with vascular surgery service, with primary service, and with patient who agrees with re-initiation of antibiotics preoperatively.  I personally reviewed prior medical records.  Thank you for this consultation.  We will  follow along with you.        HISTORY OF PRESENT ILLNESS:  Reason for Consult: Right hallux dry gangrene, leukocytosis    HPI: Anamaria Parnell is a 79 y.o. female with DM 2, CKD 3, PAD who initially presented to [facility] on 7/16 due to progressively worsening right hallux wound with gangrene over the past several months.  Patient was started on cefazolin and was transferred to [facility] for revascularization.  WBC count was 14 initially.  No fevers.  Patient was continued on cefazolin at [facility].  On 7/24, WBC count acutely vick to 32 postoperatively after patient underwent B CFA endarterectomies/profundaplasty with bovine pericardial patches, Left JACI/EIA POBA/Sockwave IVL/stenting x 2; Left to right femoral to femoral bypass.  Patient did remain intubated postoperatively but was extubated the following day.  Foot was reevaluated by podiatry over the weekend with no signs of acute infection.  Cefazolin was ultimately stopped after 10-day course.  ID service is consulted given persistent leukocytosis, now up to 36.  Patient otherwise remains afebrile.  Her foot was again reevaluated this morning now with developing concern for possible transition to wet gangrene as there is some localized redness surrounding the hallux and some malodor at the site.    REVIEW OF SYSTEMS:  A complete system-based review of systems is otherwise negative.    PAST MEDICAL HISTORY:  Past Medical History:   Diagnosis Date    Aphasia as late effect of cerebrovascular accident (CVA) 08/16/2023    Arthritis     Chronic kidney disease     Diabetes mellitus (HCC)     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/20/2021    Endometriosis     High cholesterol     History of left common carotid artery stent placement 10/27/2023    Hypertension     Kidney disease, chronic, stage III (moderate, EGFR 30-59 ml/min) ([facility])     Lumbar disc herniation     Median arcuate ligament syndrome (HCC) 11/05/2019     Neuropathy     Obesity (BMI 30-39.9) 12/04/2017    Obesity, morbid (HCC) 01/20/2021    Peripheral vascular disease (HCC)     Pneumonia     Shoulder injury     left    Spinal stenosis     Stroke (HCC) 2015    Memory loss     Past Surgical History:   Procedure Laterality Date    ARTERIOGRAM Bilateral 7/24/2024    Procedure: ARTERIOGRAM;  Surgeon: Jose Crum DO;  Location: [facility];  Service: Vascular    CARDIAC CATHETERIZATION      CAROTID STENT Left 9/7/2023    Procedure: L TCAR;  Surgeon: Nicole Gallo MD;  Location: [facility];  Service: Vascular    COLONOSCOPY      FL RETROGRADE PYELOGRAM  4/6/2020    FRACTURE SURGERY Right     ankle    IR CAROTID STENT  9/7/2023    IR LOWER EXTREMITY ANGIOGRAM  7/24/2024    OVARIAN CYST SURGERY      CT CYSTO BLADDER W/URETERAL CATHETERIZATION Bilateral 4/6/2020    Procedure: CYSTOSCOPY WITH RETROGRADE PYELOGRAM;  Surgeon: Robert Mitchell MD;  [facility];  Service: Urology    CT CYSTO W/INSERT URETERAL STENT Right 4/6/2020    Procedure: INSERTION STENT URETERAL;  Surgeon: Rboert Mitchell MD;  [facility];  Service: Urology    CT CYSTO W/REMOVAL OF TUMORS SMALL N/A 4/6/2020    Procedure: TRANSURETHRAL RESECTION OF BLADDER TUMOR (TURBT);  Surgeon: Robert Mitchell MD;  [facility];  Service: Urology    CT TEAEC W/WO PATCH GRAFT COMMON FEMORAL Bilateral 7/24/2024    Procedure: Bilateral femoral endarterectomies with patch angioplasty; Retrograde iliac intervention, shockwave, stent placement and angioplasty; femoral to femoral bypass with PTFE;  Surgeon: Jose Crum DO;  Location[facility];  Service: Vascular    ROTATOR CUFF REPAIR Left     TONSILLECTOMY         FAMILY HISTORY:  Non-contributory    SOCIAL HISTORY:  Social History     Substance and Sexual Activity   Alcohol Use Never     Social History     Substance and Sexual Activity   Drug Use Never     Social History     Tobacco Use   Smoking Status Former    Current  packs/day: 0.00    Average packs/day: 2.0 packs/day for 54.6 years (109.1 ttl pk-yrs)    Types: Cigarettes    Start date: 1966    Quit date: 2020    Years since quittin.0   Smokeless Tobacco Never       ALLERGIES:  Allergies   Allergen Reactions    Fenofibrate Other (See Comments)      blood in urine  hx  Kidney Failure    Colesevelam Other (See Comments)      leg pains    Colestipol Itching and Other (See Comments)      Swelling lower legs    Ezetimibe GI Intolerance    Statins Myalgia       MEDICATIONS:  All current active medications have been reviewed.    PHYSICAL EXAM:  Vitals:  Temp:  [97.1 °F (36.2 °C)-97.3 °F (36.3 °C)] 97.2 °F (36.2 °C)  HR:  [] 92  Resp:  [16-18] 16  BP: (106-131)/(57-78) 113/66  SpO2:  [84 %-96 %] 84 %  Temp (24hrs), Av.2 °F (36.2 °C), Min:97.1 °F (36.2 °C), Max:97.3 °F (36.3 °C)  Current: Temperature: (!) 97.2 °F (36.2 °C)     Physical Exam:  General:  Well-nourished, well-developed, in no acute distress, sitting comfortably in chair, nontoxic  Eyes:  Conjunctive clear with no hemorrhages or effusions  Oropharynx:  No ulcers, no lesions  Neck:  Supple, trachea midline  Lungs:  Clear to auscultation bilaterally, no accessory muscle use  Cardiac:  Regular rate and rhythm, no murmurs  Abdomen:  Soft, non-tender, non-distended  Extremities:  No peripheral cyanosis, clubbing, or edema  Skin: No diffuse rashes  Foot dressing is intact.  Media images from today were personally reviewed.  Neurological:  Moves all four extremities spontaneously    LABS, IMAGING, & OTHER STUDIES:  Lab Results:  I have personally reviewed pertinent labs.  Results from last 7 days   Lab Units 24  0501 24  0437 24  0436 24  1939 24  1551 24  1341 24  1037 24  0456 24  0444   POTASSIUM mmol/L 4.1 3.8 4.5 5.3  --   --   --    < > 4.1   CHLORIDE mmol/L 106 106 112* 114*  --   --   --    < > 106   CO2 mmol/L  23 18* 17*  --   --   --    < > 24    CO2, I-STAT mmol/L  --   --   --   --  18* 20* 20*   < >  --    BUN mg/dL 38* 39* 30* 25  --   --   --    < > 22   CREATININE mg/dL 1.71* 1.92* 1.59* 1.41*  --   --   --    < > 1.43*   EGFR ml/min/1.73sq m 28 24 31 35  --   --   --    < > 35   GLUCOSE, ISTAT mg/dl  --   --   --   --  179* 186* 183*   < >  --    CALCIUM mg/dL 8.4 8.2* 8.2* 8.8  --   --   --    < > 8.4   AST U/L  --   --   --  15  --   --   --   --  12*   ALT U/L  --   --   --  3*  --   --   --   --  <3*   ALK PHOS U/L  --   --   --  74  --   --   --   --  108*    < > = values in this interval not displayed.     Results from last 7 days   Lab Units 07/27/24  0501 07/26/24  0437 07/25/24  0436   WBC Thousand/uL 24.68* 24.31* 25.83*   HEMOGLOBIN g/dL 8.2* 8.3* 9.4*   PLATELETS Thousands/uL 307 324 310         Imaging Studies:   I have personally reviewed pertinent imaging study reports and images in PACS.    Chest x-ray with interval development of pulmonary congestion, small bilateral effusions, which was personally reviewed.    Right foot x-ray with cortical destruction along the ventral aspect of first distal phalanx worrisome for acute osteomyelitis.  Large air within the overlying soft tissues.    EKG, Pathology, and Other Studies:   I have personally reviewed pertinent reports.

## 2024-07-27 NOTE — PROGRESS NOTES
"Podiatry - Progress Note  Patient: Anamaria Parnell 79 y.o. female   MRN: [id number]  PCP: Ritchie Lawton MD  Unit/Bed#: [address] [place] Encounter: [id number]  Date Of Visit: 24    ASSESSMENT:    Anamaria Parnell is a 79 y.o. female with:    Right Hallux Dry Gangrene, Davidson Grade 4  Type 2 Diabetes Mellitus  - A1c: 5/9% (24)  Stage 4 Chronic Kidney Disease  Aortoiliac Occlusive Disease  Peripheral Arterial   Cerebral Vascular Accident      PLAN:    Right foot dressing changed today, gangrene to right hallux remains dry/stable without acute clinical signs of infection present. Patient will require at least right hallux amputation, pending further vascular surgery.  Per vascular surgery, plan for femoral to BK pop bypass in OR; scheduled for Fri ().   Continue local wound care, appreciate nursing assistance with dressing changes.  Elevation and offloading on green foam wedges or pillows when non-ambulatory.  Rest of care per primary team.     Weightbearing status: Weightbearing as tolerated in surgical shoe.     SUBJECTIVE:     The patient was seen, evaluated, and assessed at bedside today. The patient was awake, alert, and in no acute distress. No acute events overnight. The patient reports no pain to right foot with dressing changes. States that she is feeling well overall. Patient denies N/V/F/chills/SOB/CP.      OBJECTIVE:     Vitals:   /57   Pulse 84   Temp (!) 97.3 °F (36.3 °C)   Resp 16   Ht 4' 10\" (1.473 m)   Wt 73 kg (160 lb 15 oz)   LMP  (LMP Unknown)   SpO2 95%   BMI 33.64 kg/m²     Temp (24hrs), Av.3 °F (36.3 °C), Min:97.1 °F (36.2 °C), Max:97.9 °F (36.6 °C)      Physical Exam:     Lungs: Non labored breathing  Abdomen: Soft, non-tender.  Lower Extremity:  Cardiovascular status at baseline from admission.  Neurological status at baseline from admission.  Musculoskeletal status at baseline from admission. No calf tenderness noted.     RLE: Dry gangrene of the hallux to " "the level of the MTPJ. Dusky discoloration just proximal to the MTPJ with no capillary refill. No fluctuance or crepitus. No open wounds. No drainage.       Clinical Images 07/27/24:  none    Additional Data:     Labs:    Results from last 7 days   Lab Units 07/27/24  0501   WBC Thousand/uL 24.68*   HEMOGLOBIN g/dL 8.2*   HEMATOCRIT % 24.6*   PLATELETS Thousands/uL 307   SEGS PCT % 82*   LYMPHO PCT % 5*   MONO PCT % 7   EOS PCT % 0     Results from last 7 days   Lab Units 07/27/24  0501 07/25/24  0436 07/24/24  1939 07/24/24  1551   POTASSIUM mmol/L 4.1   < > 5.3  --    CHLORIDE mmol/L 106   < > 114*  --    CO2 mmol/L 25   < > 17*  --    CO2, I-STAT mmol/L  --   --   --  18*   BUN mg/dL 38*   < > 25  --    CREATININE mg/dL 1.71*   < > 1.41*  --    CALCIUM mg/dL 8.4   < > 8.8  --    ALK PHOS U/L  --   --  74  --    ALT U/L  --   --  3*  --    AST U/L  --   --  15  --    GLUCOSE, ISTAT mg/dl  --   --   --  179*    < > = values in this interval not displayed.     Results from last 7 days   Lab Units 07/24/24  1504   INR  1.51*       * I Have Reviewed All Lab Data Listed Above.    Recent Cultures (last 7 days):               Imaging: I have personally reviewed pertinent films in PACS  EKG, Pathology, and Other Studies: I have personally reviewed pertinent reports.    ** Please Note: Portions of the record may have been created with voice recognition software. Occasional wrong word or \"sound a like\" substitutions may have occurred due to the inherent limitations of voice recognition software. Read the chart carefully and recognize, using context, where substitutions have occurred. **      "

## 2024-07-27 NOTE — PROGRESS NOTES
NEPHROLOGY PROGRESS NOTE   Anamaria Parnell 79 y.o. female MRN: [id number]  Unit/Bed#: [place] [address] -01 Encounter: [id number]      HPI/24hr EVENTS:    -79-year-old female past medical history of CKD 3/4, hypertension, DM2, history of CVA, CAD.  Presented with right great toe gangrene.  Nephrology consult for management of RICARDO on CKD    -Improving creatinine    ASSESSMENT/PLAN:    RICARDO on CKD 3/4  -Follows with Dr. NAYE Longoria as outpatient  -Baseline creatinine is 1.4-1.7  -Creatinine on admission 2.1, now improved to 1.7 back to baseline  -Acute etiology is likely due to to ATN from soft tissue infection/sepsis  -Renal imaging from 7/18/2024 with asymmetric left renal atrophy and relative underperfusion secondary to near renal artery occlusion, right renal cyst, bilateral ureters unremarkable.,  Prior renal imaging/kidney ultrasound from 9/23 with markedly atrophic left kidney  -Losartan is on hold, off IV fluids, has received intermittent Lasix, currently off scheduled  -Has Mclaughlin catheter 2.2 L urine output    Hypertension  -Currently on Norvasc 5 mg daily, clonidine patch 0.3 mg, Toprol- mg daily  -Recent blood pressures are acceptable    Metabolic acidosis  -Currently on sodium bicarb to 650 mg twice daily  -Recent bicarb level is 25, monitor on current regimen    Anemia  -Recent hemoglobin is 8.2, management per primary team  -Has received 3 units PRBCs on admission    Right hallux gangrene/PAD  -Status post vascular surgery procedures on admission, postoperative management per vascular surgery    SUBJECTIVE:  Reports intermittent shortness of breath none currently, reports appetite is mildly reduced denies any nausea/vomiting/diarrhea    ROS:  Review of Systems   Constitutional:  Positive for fatigue.   Respiratory:  Positive for shortness of breath (Intermittent).    Cardiovascular: Negative.    Gastrointestinal: Negative.    Genitourinary: Negative.    Neurological: Negative.       A complete 10 point  review of systems was performed and found to be negative unless otherwise noted above or in the HPI.    OBJECTIVE:  Current Weight: Weight - Scale: 73 kg (160 lb 15 oz)  Vitals:    07/26/24 2222 07/26/24 2325 07/27/24 0234 07/27/24 0732   BP: 111/78  121/72 131/61   BP Location:   Left arm Left arm   Pulse: (!) 107 (!) 120 102 85   Resp:   18 18   Temp:  (!) 97.1 °F (36.2 °C) (!) 97.3 °F (36.3 °C) (!) 97.1 °F (36.2 °C)   TempSrc:   Oral Oral   SpO2: 94% 95% 96% 96%   Weight:       Height:           Intake/Output Summary (Last 24 hours) at 7/27/2024 0951  Last data filed at 7/27/2024 0836  Gross per 24 hour   Intake 1100 ml   Output 775 ml   Net 325 ml     Physical Exam  Vitals and nursing note reviewed.   Constitutional:       General: She is not in acute distress.     Appearance: She is obese. She is not toxic-appearing or diaphoretic.      Comments: Awake lying in bed   HENT:      Head: Normocephalic and atraumatic.      Nose: Nose normal.      Mouth/Throat:      Mouth: Mucous membranes are dry.   Eyes:      General: No scleral icterus.  Cardiovascular:      Rate and Rhythm: Normal rate.      Pulses: Normal pulses.   Pulmonary:      Effort: Pulmonary effort is normal. No respiratory distress.      Breath sounds: No wheezing or rales.   Genitourinary:     Comments: Mclaughlin catheter with yellow urine  Musculoskeletal:      Cervical back: Neck supple.      Left lower leg: No edema.      Comments: Right foot dressing, trace right lower extremity edema   Skin:     General: Skin is warm and dry.      Capillary Refill: Capillary refill takes less than 2 seconds.   Neurological:      Mental Status: She is alert and oriented to person, place, and time.          Medications:    Current Facility-Administered Medications:     acetaminophen (TYLENOL) tablet 975 mg, 975 mg, Oral, Q8H JAVIER, Harvey Rey DO, 975 mg at 07/27/24 0509    allopurinol (ZYLOPRIM) tablet 100 mg, 100 mg, Oral, Daily, Harvey Rey DO, 100 mg at 07/27/24  0853    amLODIPine (NORVASC) tablet 5 mg, 5 mg, Oral, Daily, Harvey Rey DO, 5 mg at 07/27/24 0853    aspirin chewable tablet 81 mg, 81 mg, Oral, Daily, Harvey Rey DO, 81 mg at 07/27/24 0853    [Transfer Hold] chlorhexidine (PERIDEX) 0.12 % oral rinse 15 mL, 15 mL, Mouth/Throat, Q12H [facility], Hannah Baxter PA-C, 15 mL at 07/25/24 0805    cloNIDine (CATAPRES-TTS-3) 0.3 mg/24 hr TD weekly patch, 1 patch, Transdermal, Weekly, Harvey Rey DO, 0.3 mg at 07/23/24 0825    escitalopram (LEXAPRO) tablet 20 mg, 20 mg, Oral, Daily, Harvey Rey DO, 20 mg at 07/27/24 0853    heparin (porcine) subcutaneous injection 5,000 Units, 5,000 Units, Subcutaneous, Q8H [facility], 5,000 Units at 07/27/24 0509 **AND** [CANCELED] Platelet count, , , Once, Harvey Rey DO    HYDROmorphone (DILAUDID) injection 0.5 mg, 0.5 mg, Intravenous, Q5 Min PRN, Mp Nielsen DO    HYDROmorphone HCl (DILAUDID) injection 0.2 mg, 0.2 mg, Intravenous, Q6H PRN, Harvey Rey DO    insulin lispro (HumALOG/ADMELOG) 100 units/mL subcutaneous injection 1-5 Units, 1-5 Units, Subcutaneous, TID AC, 1 Units at 07/27/24 0853 **AND** Fingerstick Glucose (POCT), , , TID AC, Harvey Rey DO    lidocaine (LIDODERM) 5 % patch 1 patch, 1 patch, Topical, Daily, Harvey Rey DO, 1 patch at 07/27/24 0853    LORazepam (ATIVAN) tablet 0.5 mg, 0.5 mg, Oral, Q8H PRN, Harvey Rey DO, 0.5 mg at 07/23/24 1307    metoprolol succinate (TOPROL-XL) 24 hr tablet 100 mg, 100 mg, Oral, Daily, Harvey Rey DO, 100 mg at 07/27/24 0853    ondansetron (ZOFRAN) injection 4 mg, 4 mg, Intravenous, Q6H PRN, Harvey Rey DO    oxyCODONE (ROXICODONE) IR tablet 5 mg, 5 mg, Oral, Q6H PRN, Harvey Rey DO, 5 mg at 07/26/24 1342    pravastatin (PRAVACHOL) tablet 80 mg, 80 mg, Oral, Daily With Dinner, Harvey Rey DO, 80 mg at 07/26/24 1703    sodium bicarbonate tablet 650 mg, 650 mg, Oral, BID after meals, Harvey Rey DO, 650 mg at 07/27/24 0853    ticagrelor (BRILINTA) tablet 90 mg, 90  mg, Oral, Q12H [facility], Harvey Rey DO, 90 mg at 07/27/24 0853    traZODone (DESYREL) tablet 50 mg, 50 mg, Oral, , Harvey Rey DO, 50 mg at 07/26/24 2107    Laboratory Results:  Results from last 7 days   Lab Units 07/27/24  0501 07/26/24  0437 07/25/24  0436 07/24/24  1939 07/24/24  1551 07/24/24  1341 07/24/24  1037 07/24/24  0948 07/24/24  0901 07/24/24  0901 07/24/24  0456 07/23/24  0444 07/22/24  0541   WBC Thousand/uL 24.68* 24.31* 25.83* 32.36*  --   --   --   --   --   --  13.51* 12.47* 13.02*   HEMOGLOBIN g/dL 8.2* 8.3* 9.4* 11.0*  --   --   --   --   --   --  10.2* 9.9* 10.6*   I STAT HEMOGLOBIN g/dl  --   --   --   --  7.8* 8.2* 7.8* 7.5*   < > 7.1*  --   --   --    HEMATOCRIT % 24.6* 24.7* 28.1* 33.1*  --   --   --   --   --   --  31.3* 30.3* 32.2*   HEMATOCRIT, ISTAT %  --   --   --   --  23* 24* 23* 22*   < > 21*  --   --   --    PLATELETS Thousands/uL 307 324 310 293  --   --   --   --   --   --  357 315 351   POTASSIUM mmol/L 4.1 3.8 4.5 5.3  --   --   --   --   --   --  3.6 4.1 4.1   CHLORIDE mmol/L 106 106 112* 114*  --   --   --   --   --   --  104 106 106   CO2 mmol/L 25 23 18* 17*  --   --   --   --   --   --  23 24 24   CO2, I-STAT mmol/L  --   --   --   --  18* 20* 20* 21   < > 23  --   --   --    BUN mg/dL 38* 39* 30* 25  --   --   --   --   --   --  24 22 21   CREATININE mg/dL 1.71* 1.92* 1.59* 1.41*  --   --   --   --   --   --  1.46* 1.43* 1.49*   CALCIUM mg/dL 8.4 8.2* 8.2* 8.8  --   --   --   --   --   --  8.8 8.4 8.3*   MAGNESIUM mg/dL 2.2  --   --  1.6*  --   --   --   --   --   --   --   --   --    PHOSPHORUS mg/dL  --   --   --  6.4*  --   --   --   --   --   --   --   --   --    GLUCOSE, ISTAT mg/dl  --   --   --   --  179* 186* 183* 163*  --  138  --   --   --     < > = values in this interval not displayed.       I have personally reviewed the blood work as stated above and in my note.  I have personally reviewed internal medicine, vascular surgery, cardiology note.

## 2024-07-27 NOTE — ASSESSMENT & PLAN NOTE
79-year-old female with history of PAD, CAD, diabetes, and hypertension who presented from the [facility] due to right great toe gangrene.  She was transferred here for vascular bypass. XR right foot showing cortical destruction along the ventral aspect of the first distal phalanx worrisome for acute osteomyelitis.     S/p Vascular surgery intervention 7/24/2024  Continue Aspirin Brillinta pravastatin  Patient has received a 10 day course in the ICU of cefazolin.  Infectious disease recommends monitoring off antibiotics for now  For amputation by podiatry after revascularization has been completed by vascular surgery (Friday)

## 2024-07-27 NOTE — PLAN OF CARE
Problem: PAIN - ADULT  Goal: Verbalizes/displays adequate comfort level or baseline comfort level  Description: Interventions:  - Encourage patient to monitor pain and request assistance  - Assess pain using appropriate pain scale  - Administer analgesics based on type and severity of pain and evaluate response  - Implement non-pharmacological measures as appropriate and evaluate response  - Consider cultural and social influences on pain and pain management  - Notify physician/advanced practitioner if interventions unsuccessful or patient reports new pain  Outcome: Progressing     Problem: INFECTION - ADULT  Goal: Absence or prevention of progression during hospitalization  Description: INTERVENTIONS:  - Assess and monitor for signs and symptoms of infection  - Monitor lab/diagnostic results  - Monitor all insertion sites, i.e. indwelling lines, tubes, and drains  - Monitor endotracheal if appropriate and nasal secretions for changes in amount and color  - Wink appropriate cooling/warming therapies per order  - Administer medications as ordered  - Instruct and encourage patient and family to use good hand hygiene technique  - Identify and instruct in appropriate isolation precautions for identified infection/condition  Outcome: Progressing     Problem: SAFETY ADULT  Goal: Patient will remain free of falls  Description: INTERVENTIONS:  - Educate patient/family on patient safety including physical limitations  - Instruct patient to call for assistance with activity   - Consult OT/PT to assist with strengthening/mobility   - Keep Call bell within reach  - Keep bed low and locked with side rails adjusted as appropriate  - Keep care items and personal belongings within reach  - Initiate and maintain comfort rounds  - Make Fall Risk Sign visible to staff  - Offer Toileting every 2 Hours, in advance of need  - Initiate/Maintain bed alarm  - Apply yellow socks and bracelet for high fall risk patients  - Consider  moving patient to room near nurses station  Outcome: Progressing  Goal: Maintain or return to baseline ADL function  Description: INTERVENTIONS:  -  Assess patient's ability to carry out ADLs; assess patient's baseline for ADL function and identify physical deficits which impact ability to perform ADLs (bathing, care of mouth/teeth, toileting, grooming, dressing, etc.)  - Assess/evaluate cause of self-care deficits   - Assess range of motion  - Assess patient's mobility; develop plan if impaired  - Assess patient's need for assistive devices and provide as appropriate  - Encourage maximum independence but intervene and supervise when necessary  - Involve family in performance of ADLs  - Assess for home care needs following discharge   - Consider OT consult to assist with ADL evaluation and planning for discharge  - Provide patient education as appropriate  Outcome: Progressing  Goal: Maintains/Returns to pre admission functional level  Description: INTERVENTIONS:  - Perform AM-PAC 6 Click Basic Mobility/ Daily Activity assessment daily.  - Set and communicate daily mobility goal to care team and patient/family/caregiver.   - Collaborate with rehabilitation services on mobility goals if consulted  - Stand patient 3 times a day  - Ambulate patient 3 times a day  - Out of bed to chair 3 times a day   - Out of bed for meals 3 times a day  - Out of bed for toileting  - Record patient progress and toleration of activity level   Outcome: Progressing     Problem: DISCHARGE PLANNING  Goal: Discharge to home or other facility with appropriate resources  Description: INTERVENTIONS:  - Identify barriers to discharge w/patient and caregiver  - Arrange for needed discharge resources and transportation as appropriate  - Identify discharge learning needs (meds, wound care, etc.)  - Arrange for interpretive services to assist at discharge as needed  - Refer to Case Management Department for coordinating discharge planning if the  patient needs post-hospital services based on physician/advanced practitioner order or complex needs related to functional status, cognitive ability, or social support system  Outcome: Progressing     Problem: Knowledge Deficit  Goal: Patient/family/caregiver demonstrates understanding of disease process, treatment plan, medications, and discharge instructions  Description: Complete learning assessment and assess knowledge base.  Interventions:  - Provide teaching at level of understanding  - Provide teaching via preferred learning methods  Outcome: Progressing     Problem: CARDIOVASCULAR - ADULT  Goal: Maintains optimal cardiac output and hemodynamic stability  Description: INTERVENTIONS:  - Monitor I/O, vital signs and rhythm  - Monitor for S/S and trends of decreased cardiac output  - Administer and titrate ordered vasoactive medications to optimize hemodynamic stability  - Assess quality of pulses, skin color and temperature  - Assess for signs of decreased coronary artery perfusion  - Instruct patient to report change in severity of symptoms  Outcome: Progressing     Problem: METABOLIC, FLUID AND ELECTROLYTES - ADULT  Goal: Electrolytes maintained within normal limits  Description: INTERVENTIONS:  - Monitor labs and assess patient for signs and symptoms of electrolyte imbalances  - Administer electrolyte replacement as ordered  - Monitor response to electrolyte replacements, including repeat lab results as appropriate  - Instruct patient on fluid and nutrition as appropriate  Outcome: Progressing  Goal: Fluid balance maintained  Description: INTERVENTIONS:  - Monitor labs   - Monitor I/O and WT  - Instruct patient on fluid and nutrition as appropriate  - Assess for signs & symptoms of volume excess or deficit  Outcome: Progressing  Goal: Glucose maintained within target range  Description: INTERVENTIONS:  - Monitor Blood Glucose as ordered  - Assess for signs and symptoms of hyperglycemia and hypoglycemia  -  Administer ordered medications to maintain glucose within target range  - Assess nutritional intake and initiate nutrition service referral as needed  Outcome: Progressing     Problem: HEMATOLOGIC - ADULT  Goal: Maintains hematologic stability  Description: INTERVENTIONS  - Assess for signs and symptoms of bleeding or hemorrhage  - Monitor labs  - Administer supportive blood products/factors as ordered and appropriate  Outcome: Progressing     Problem: MUSCULOSKELETAL - ADULT  Goal: Maintain or return mobility to safest level of function  Description: INTERVENTIONS:  - Assess patient's ability to carry out ADLs; assess patient's baseline for ADL function and identify physical deficits which impact ability to perform ADLs (bathing, care of mouth/teeth, toileting, grooming, dressing, etc.)  - Assess/evaluate cause of self-care deficits   - Assess range of motion  - Assess patient's mobility  - Assess patient's need for assistive devices and provide as appropriate  - Encourage maximum independence but intervene and supervise when necessary  - Involve family in performance of ADLs  - Assess for home care needs following discharge   - Consider OT consult to assist with ADL evaluation and planning for discharge  - Provide patient education as appropriate  Outcome: Progressing  Goal: Maintain proper alignment of affected body part  Description: INTERVENTIONS:  - Support, maintain and protect limb and body alignment  - Provide patient/ family with appropriate education  Outcome: Progressing     Problem: Prexisting or High Potential for Compromised Skin Integrity  Goal: Skin integrity is maintained or improved  Description: INTERVENTIONS:  - Identify patients at risk for skin breakdown  - Assess and monitor skin integrity  - Assess and monitor nutrition and hydration status  - Monitor labs   - Assess for incontinence   - Turn and reposition patient  - Assist with mobility/ambulation  - Relieve pressure over bony  prominences  - Avoid friction and shearing  - Provide appropriate hygiene as needed including keeping skin clean and dry  - Evaluate need for skin moisturizer/barrier cream  - Collaborate with interdisciplinary team   - Patient/family teaching  - Consider wound care consult   Outcome: Progressing     Problem: Nutrition/Hydration-ADULT  Goal: Nutrient/Hydration intake appropriate for improving, restoring or maintaining nutritional needs  Description: Monitor and assess patient's nutrition/hydration status for malnutrition. Collaborate with interdisciplinary team and initiate plan and interventions as ordered.  Monitor patient's weight and dietary intake as ordered or per policy. Utilize nutrition screening tool and intervene as necessary. Determine patient's food preferences and provide high-protein, high-caloric foods as appropriate.     INTERVENTIONS:  - Monitor oral intake, urinary output, labs, and treatment plans  - Assess nutrition and hydration status and recommend course of action  - Evaluate amount of meals eaten  - Assist patient with eating if necessary   - Allow adequate time for meals  - Recommend/ encourage appropriate diets, oral nutritional supplements, and vitamin/mineral supplements  - Order, calculate, and assess calorie counts as needed  - Recommend, monitor, and adjust tube feedings and TPN/PPN based on assessed needs  - Assess need for intravenous fluids  - Provide specific nutrition/hydration education as appropriate  - Include patient/family/caregiver in decisions related to nutrition  Outcome: Progressing

## 2024-07-27 NOTE — ASSESSMENT & PLAN NOTE
Lab Results   Component Value Date    HGBA1C 5.9 (H) 07/09/2024     Recent Labs     07/26/24  1639 07/26/24  2107 07/27/24  0731 07/27/24  1152   POCGLU 134 185* 165* 164*       Home regimen: Tradjenta  Inpatient regimen: Sliding scale

## 2024-07-27 NOTE — PLAN OF CARE
Problem: PAIN - ADULT  Goal: Verbalizes/displays adequate comfort level or baseline comfort level  Description: Interventions:  - Encourage patient to monitor pain and request assistance  - Assess pain using appropriate pain scale  - Administer analgesics based on type and severity of pain and evaluate response  - Implement non-pharmacological measures as appropriate and evaluate response  - Consider cultural and social influences on pain and pain management  - Notify physician/advanced practitioner if interventions unsuccessful or patient reports new pain  Outcome: Progressing     Problem: INFECTION - ADULT  Goal: Absence or prevention of progression during hospitalization  Description: INTERVENTIONS:  - Assess and monitor for signs and symptoms of infection  - Monitor lab/diagnostic results  - Monitor all insertion sites, i.e. indwelling lines, tubes, and drains  - Monitor endotracheal if appropriate and nasal secretions for changes in amount and color  - [place] appropriate cooling/warming therapies per order  - Administer medications as ordered  - Instruct and encourage patient and family to use good hand hygiene technique  - Identify and instruct in appropriate isolation precautions for identified infection/condition  Outcome: Progressing     Problem: SAFETY ADULT  Goal: Patient will remain free of falls  Description: INTERVENTIONS:  - Educate patient/family on patient safety including physical limitations  - Instruct patient to call for assistance with activity   - Consult OT/PT to assist with strengthening/mobility   - Keep Call bell within reach  - Keep bed low and locked with side rails adjusted as appropriate  - Keep care items and personal belongings within reach  - Initiate and maintain comfort rounds  - Make Fall Risk Sign visible to staff  - Offer Toileting every 2 Hours, in advance of need  - Initiate/Maintain bed alarm  - Apply yellow socks and bracelet for high fall risk patients  - Consider  moving patient to room near nurses station  Outcome: Progressing  Goal: Maintain or return to baseline ADL function  Description: INTERVENTIONS:  -  Assess patient's ability to carry out ADLs; assess patient's baseline for ADL function and identify physical deficits which impact ability to perform ADLs (bathing, care of mouth/teeth, toileting, grooming, dressing, etc.)  - Assess/evaluate cause of self-care deficits   - Assess range of motion  - Assess patient's mobility; develop plan if impaired  - Assess patient's need for assistive devices and provide as appropriate  - Encourage maximum independence but intervene and supervise when necessary  - Involve family in performance of ADLs  - Assess for home care needs following discharge   - Consider OT consult to assist with ADL evaluation and planning for discharge  - Provide patient education as appropriate  Outcome: Progressing  Goal: Maintains/Returns to pre admission functional level  Description: INTERVENTIONS:  - Perform AM-PAC 6 Click Basic Mobility/ Daily Activity assessment daily.  - Set and communicate daily mobility goal to care team and patient/family/caregiver.   - Collaborate with rehabilitation services on mobility goals if consulted  - Stand patient 3 times a day  - Ambulate patient 3 times a day  - Out of bed to chair 3 times a day   - Out of bed for meals 3 times a day  - Out of bed for toileting  - Record patient progress and toleration of activity level   Outcome: Progressing     Problem: DISCHARGE PLANNING  Goal: Discharge to home or other facility with appropriate resources  Description: INTERVENTIONS:  - Identify barriers to discharge w/patient and caregiver  - Arrange for needed discharge resources and transportation as appropriate  - Identify discharge learning needs (meds, wound care, etc.)  - Arrange for interpretive services to assist at discharge as needed  - Refer to Case Management Department for coordinating discharge planning if the  patient needs post-hospital services based on physician/advanced practitioner order or complex needs related to functional status, cognitive ability, or social support system  Outcome: Progressing     Problem: Knowledge Deficit  Goal: Patient/family/caregiver demonstrates understanding of disease process, treatment plan, medications, and discharge instructions  Description: Complete learning assessment and assess knowledge base.  Interventions:  - Provide teaching at level of understanding  - Provide teaching via preferred learning methods  Outcome: Progressing     Problem: CARDIOVASCULAR - ADULT  Goal: Maintains optimal cardiac output and hemodynamic stability  Description: INTERVENTIONS:  - Monitor I/O, vital signs and rhythm  - Monitor for S/S and trends of decreased cardiac output  - Administer and titrate ordered vasoactive medications to optimize hemodynamic stability  - Assess quality of pulses, skin color and temperature  - Assess for signs of decreased coronary artery perfusion  - Instruct patient to report change in severity of symptoms  Outcome: Progressing     Problem: METABOLIC, FLUID AND ELECTROLYTES - ADULT  Goal: Electrolytes maintained within normal limits  Description: INTERVENTIONS:  - Monitor labs and assess patient for signs and symptoms of electrolyte imbalances  - Administer electrolyte replacement as ordered  - Monitor response to electrolyte replacements, including repeat lab results as appropriate  - Instruct patient on fluid and nutrition as appropriate  Outcome: Progressing  Goal: Fluid balance maintained  Description: INTERVENTIONS:  - Monitor labs   - Monitor I/O and WT  - Instruct patient on fluid and nutrition as appropriate  - Assess for signs & symptoms of volume excess or deficit  Outcome: Progressing  Goal: Glucose maintained within target range  Description: INTERVENTIONS:  - Monitor Blood Glucose as ordered  - Assess for signs and symptoms of hyperglycemia and hypoglycemia  -  Administer ordered medications to maintain glucose within target range  - Assess nutritional intake and initiate nutrition service referral as needed  Outcome: Progressing     Problem: HEMATOLOGIC - ADULT  Goal: Maintains hematologic stability  Description: INTERVENTIONS  - Assess for signs and symptoms of bleeding or hemorrhage  - Monitor labs  - Administer supportive blood products/factors as ordered and appropriate  Outcome: Progressing     Problem: MUSCULOSKELETAL - ADULT  Goal: Maintain or return mobility to safest level of function  Description: INTERVENTIONS:  - Assess patient's ability to carry out ADLs; assess patient's baseline for ADL function and identify physical deficits which impact ability to perform ADLs (bathing, care of mouth/teeth, toileting, grooming, dressing, etc.)  - Assess/evaluate cause of self-care deficits   - Assess range of motion  - Assess patient's mobility  - Assess patient's need for assistive devices and provide as appropriate  - Encourage maximum independence but intervene and supervise when necessary  - Involve family in performance of ADLs  - Assess for home care needs following discharge   - Consider OT consult to assist with ADL evaluation and planning for discharge  - Provide patient education as appropriate  Outcome: Progressing  Goal: Maintain proper alignment of affected body part  Description: INTERVENTIONS:  - Support, maintain and protect limb and body alignment  - Provide patient/ family with appropriate education  Outcome: Progressing     Problem: Prexisting or High Potential for Compromised Skin Integrity  Goal: Skin integrity is maintained or improved  Description: INTERVENTIONS:  - Identify patients at risk for skin breakdown  - Assess and monitor skin integrity  - Assess and monitor nutrition and hydration status  - Monitor labs   - Assess for incontinence   - Turn and reposition patient  - Assist with mobility/ambulation  - Relieve pressure over bony  prominences  - Avoid friction and shearing  - Provide appropriate hygiene as needed including keeping skin clean and dry  - Evaluate need for skin moisturizer/barrier cream  - Collaborate with interdisciplinary team   - Patient/family teaching  - Consider wound care consult   Outcome: Progressing     Problem: Nutrition/Hydration-ADULT  Goal: Nutrient/Hydration intake appropriate for improving, restoring or maintaining nutritional needs  Description: Monitor and assess patient's nutrition/hydration status for malnutrition. Collaborate with interdisciplinary team and initiate plan and interventions as ordered.  Monitor patient's weight and dietary intake as ordered or per policy. Utilize nutrition screening tool and intervene as necessary. Determine patient's food preferences and provide high-protein, high-caloric foods as appropriate.     INTERVENTIONS:  - Monitor oral intake, urinary output, labs, and treatment plans  - Assess nutrition and hydration status and recommend course of action  - Evaluate amount of meals eaten  - Assist patient with eating if necessary   - Allow adequate time for meals  - Recommend/ encourage appropriate diets, oral nutritional supplements, and vitamin/mineral supplements  - Order, calculate, and assess calorie counts as needed  - Recommend, monitor, and adjust tube feedings and TPN/PPN based on assessed needs  - Assess need for intravenous fluids  - Provide specific nutrition/hydration education as appropriate  - Include patient/family/caregiver in decisions related to nutrition  Outcome: Progressing     Problem: NEUROSENSORY - ADULT  Goal: Achieves maximal functionality and self care  Description: INTERVENTIONS  - Monitor swallowing and airway patency with patient fatigue and changes in neurological status  - Encourage and assist patient to increase activity and self care.   - Encourage visually impaired, hearing impaired and aphasic patients to use assistive/communication  devices  Outcome: Progressing

## 2024-07-27 NOTE — ASSESSMENT & PLAN NOTE
80 yo female ex-smoker w/ a hx of obesity, CKD IV, DM II w/ peripheral neuropathy, HLD, HTN, CAD, L MCA and PCA CVA S/P L TCAR 9/7/23 ([place]), FÉLIX, mesenteric artery stenosis and aortoiliac occlusive disease w/ PAD and chronic R hallux dry gangrene presented to [facility] on 7/16/24 w/ worsening R great toe pain and R hallux gangrene. Pt seen in consult by nephrology who continues to follow pt for RICARDO on CKD. Pt was transferred to [facility] on 7/19 w/ plans for R femoral endarterectomy w/ retrograde stenting and RLE bypass. Pt seen in consult by podiatry for R hallux dry gangrene. Xray R foot (+) OM. Podiatry plans local wound care w/ re-eval after revascularization.    Vascular surgery consulted for possible revascularization options for known AIOD/PAD w/ CLTI and R hallux dry gangrene. Pt follows w/ vascular surgery for PAD and chronic limb ischemia; last seen in the office on 6/4/24. Imaging shows extensive AIOD and peripheral arterial disease with occlusion of the R iliac system, nearly occlusive R CFA, and occlusion of popliteal artery with recon of the below knee popliteal and tibials. R LISA: 0.11/-/-.     Pt is S/P B/L CFAE, L JACI/EIA balloon angio and shockwave lithotripsy w/ insertion of a L EIA drug-coated stent and L JACI VBX stent, L to R PTFE fem-fem bypass, and B/L groin vac placement on 7/24. Pt received 3U PRBC and 1U FFP intra-op. Plan to return to OR for R femoral to BK-pop bypass; tentatively scheduled for 8/2/24.    Diagnostics:  -7/22/24 Xray R foot: Cortical destruction along the ventral aspect of the first distal phalanx worrisome for acute osteomyelitis. Large air within the overlying soft tissues.   -7/21/24 Echo: Left ventricular cavity size is normal. Wall thickness is severely increased. The left ventricular ejection fraction is 41% by biplane measurement. Systolic function is mildly reduced. Global longitudinal strain is reduced at -10% with moderate to severe strain reduction in the ED mid to basal  anteroseptal septal and inferior walls.. Unable to grade diastolic dysfunction given the severe degree of mitral annular calcification.   -7/18/24 LE vein mapping: Right: The great saphenous vein is patent  from the groin to the ankle. The intraluminal diameter measurements range from 2.2mm to 6.4mm throughout. Left: The great saphenous vein is patent from the groin to the ankle. The intraluminal diameter measurements range from 1.3mm to 6.8mm throughout.  -7/18/24 CTA abd w/ runoff: Visualized descending thoracic and suprarenal abdominal aorta demonstrate marked soft plaque with multifocal regions of plaque ulceration. A point of relative stenosis within the infrarenal abdominal aorta measures up to 7 mm in diameter. Bilateral multifocal severe stenosis within the external iliac and common femoral arteries. Bilateral long segment SFA occlusion extending from the ostia to the level of the P1 popliteal artery. Three-vessel runoff on the right, the posterior tibial artery is dominant. Two-vessel runoff on the left, the peroneal artery is dominant. Segmental visualization of the posterior tibial artery. Interval increase in size of an exophytic lesion arising medially from the stomach, again consistent with gastrointestinal stromal tumor. New dilation of the pancreatic duct within the pancreatic head, no obvious pancreatic/ampulla lesion identified.  -7/16/24 LEAD: Right: Severe diffuse disease noted throughout the femoral-popliteal arteries with high grade stenosis vs occlusion of the proximal-distal superficial femoral artery with reconstitution to the popliteal artery. Evidence suggestive of TIb/Peroneal occlusive disease. There is a high grade stenosis vs occlusion of the distal anterior tibial artery. R LISA: 0.11/-/-. Left: Severe diffuse disease noted throughout the femoral-popliteal arteries with high grade stenosis vs occlusion of the proximal-mid superficial femoral artery with reconstitution in the distal  SFA. Evidence suggestive of TIb/Peroneal occlusive disease. There is high grade stenosis vs. occlusion of the entire posterior tibial artery. L LISA: 0.31/20/19.    Plan:   -Advanced calcific atherosclerotic AIOD/PAD w/ CTLI and R hallux dry gangrene (+) OM   -S/P B/L CFAE, L JACI/EIA balloon angio and shockwave lithotripsy w/ insertion of a L EIA drug-coated stent and L JACI VBX stent, L to R PTFE fem-fem bypass, and B/L groin vac placement on 7/24 (POD #3)  -B/L groin wound vac intact w/ adequate suction  -BL DP/PT signals  -Plan for femoral to BK pop bypass in OR; scheduled for Fri (8/2)  -Pt completed a 10-day course of IV ancef on 7/25; however, WBC remains elevated w/ no surgical cure  -ID consult pending to eval for possible further need for ABX  -Podiatry following w/ plans to continue local wound care for now w/ re-eval after revascularization; input appreciated  -Pt transiently tachycardic overnight w/ ; resolved without intervention  -Cardiology following peripherally and will re-eval pt later next week for repeat cardiac risk assessment/optimization prior to planned OR on Friday; input appreciated  -ABLA secondary to recent surgery SP 3U PRBC and 1U FFP intra-op; Hgb stabilized at 8.2 today  -Continue to monitor Hgb and transfuse < 7.0 per primary team  -Continue ASA, brilinta and pravastatin for medical management  -Okay to discontinue huffman cath from vascular surgery standpoint  -Nephrology following for RICARDO on CKD; input appreciated  -Continue medical management per primary team  -PT/OT consult  -Will discuss w/ Dr. Peterson

## 2024-07-28 LAB
ANION GAP SERPL CALCULATED.3IONS-SCNC: 10 MMOL/L (ref 4–13)
BUN SERPL-MCNC: 41 MG/DL (ref 5–25)
CALCIUM SERPL-MCNC: 8.2 MG/DL (ref 8.4–10.2)
CHLORIDE SERPL-SCNC: 107 MMOL/L (ref 96–108)
CO2 SERPL-SCNC: 24 MMOL/L (ref 21–32)
CREAT SERPL-MCNC: 1.59 MG/DL (ref 0.6–1.3)
ERYTHROCYTE [DISTWIDTH] IN BLOOD BY AUTOMATED COUNT: 15.2 % (ref 11.6–15.1)
GFR SERPL CREATININE-BSD FRML MDRD: 31 ML/MIN/1.73SQ M
GLUCOSE SERPL-MCNC: 148 MG/DL (ref 65–140)
GLUCOSE SERPL-MCNC: 150 MG/DL (ref 65–140)
GLUCOSE SERPL-MCNC: 156 MG/DL (ref 65–140)
GLUCOSE SERPL-MCNC: 187 MG/DL (ref 65–140)
GLUCOSE SERPL-MCNC: 187 MG/DL (ref 65–140)
HCT VFR BLD AUTO: 24.1 % (ref 34.8–46.1)
HGB BLD-MCNC: 7.9 G/DL (ref 11.5–15.4)
MAGNESIUM SERPL-MCNC: 2.2 MG/DL (ref 1.9–2.7)
MCH RBC QN AUTO: 31.6 PG (ref 26.8–34.3)
MCHC RBC AUTO-ENTMCNC: 32.8 G/DL (ref 31.4–37.4)
MCV RBC AUTO: 96 FL (ref 82–98)
PLATELET # BLD AUTO: 360 THOUSANDS/UL (ref 149–390)
PMV BLD AUTO: 10.7 FL (ref 8.9–12.7)
POTASSIUM SERPL-SCNC: 3.4 MMOL/L (ref 3.5–5.3)
RBC # BLD AUTO: 2.5 MILLION/UL (ref 3.81–5.12)
SODIUM SERPL-SCNC: 141 MMOL/L (ref 135–147)
WBC # BLD AUTO: 28.95 THOUSAND/UL (ref 4.31–10.16)

## 2024-07-28 PROCEDURE — 82948 REAGENT STRIP/BLOOD GLUCOSE: CPT

## 2024-07-28 PROCEDURE — 97110 THERAPEUTIC EXERCISES: CPT

## 2024-07-28 PROCEDURE — 83735 ASSAY OF MAGNESIUM: CPT | Performed by: STUDENT IN AN ORGANIZED HEALTH CARE EDUCATION/TRAINING PROGRAM

## 2024-07-28 PROCEDURE — 99232 SBSQ HOSP IP/OBS MODERATE 35: CPT | Performed by: STUDENT IN AN ORGANIZED HEALTH CARE EDUCATION/TRAINING PROGRAM

## 2024-07-28 PROCEDURE — 85027 COMPLETE CBC AUTOMATED: CPT | Performed by: STUDENT IN AN ORGANIZED HEALTH CARE EDUCATION/TRAINING PROGRAM

## 2024-07-28 PROCEDURE — 80048 BASIC METABOLIC PNL TOTAL CA: CPT | Performed by: STUDENT IN AN ORGANIZED HEALTH CARE EDUCATION/TRAINING PROGRAM

## 2024-07-28 PROCEDURE — 97116 GAIT TRAINING THERAPY: CPT

## 2024-07-28 PROCEDURE — 99024 POSTOP FOLLOW-UP VISIT: CPT | Performed by: SURGERY

## 2024-07-28 RX ORDER — POTASSIUM CHLORIDE 20 MEQ/1
40 TABLET, EXTENDED RELEASE ORAL ONCE
Status: COMPLETED | OUTPATIENT
Start: 2024-07-28 | End: 2024-07-28

## 2024-07-28 RX ADMIN — HEPARIN SODIUM 5000 UNITS: 5000 INJECTION INTRAVENOUS; SUBCUTANEOUS at 21:17

## 2024-07-28 RX ADMIN — TICAGRELOR 90 MG: 90 TABLET ORAL at 21:17

## 2024-07-28 RX ADMIN — ACETAMINOPHEN 975 MG: 325 TABLET, FILM COATED ORAL at 13:11

## 2024-07-28 RX ADMIN — LIDOCAINE 5% 1 PATCH: 700 PATCH TOPICAL at 08:40

## 2024-07-28 RX ADMIN — SODIUM BICARBONATE 650 MG TABLET 650 MG: at 08:40

## 2024-07-28 RX ADMIN — ACETAMINOPHEN 975 MG: 325 TABLET, FILM COATED ORAL at 21:17

## 2024-07-28 RX ADMIN — INSULIN LISPRO 1 UNITS: 100 INJECTION, SOLUTION INTRAVENOUS; SUBCUTANEOUS at 18:05

## 2024-07-28 RX ADMIN — TORSEMIDE 10 MG: 10 TABLET ORAL at 08:40

## 2024-07-28 RX ADMIN — ESCITALOPRAM OXALATE 20 MG: 20 TABLET ORAL at 08:40

## 2024-07-28 RX ADMIN — ASPIRIN 81 MG CHEWABLE TABLET 81 MG: 81 TABLET CHEWABLE at 08:40

## 2024-07-28 RX ADMIN — TICAGRELOR 90 MG: 90 TABLET ORAL at 08:40

## 2024-07-28 RX ADMIN — ACETAMINOPHEN 975 MG: 325 TABLET, FILM COATED ORAL at 05:22

## 2024-07-28 RX ADMIN — TRAZODONE HYDROCHLORIDE 50 MG: 50 TABLET ORAL at 21:17

## 2024-07-28 RX ADMIN — ALLOPURINOL 100 MG: 100 TABLET ORAL at 08:40

## 2024-07-28 RX ADMIN — INSULIN LISPRO 1 UNITS: 100 INJECTION, SOLUTION INTRAVENOUS; SUBCUTANEOUS at 08:40

## 2024-07-28 RX ADMIN — METOPROLOL SUCCINATE 100 MG: 50 TABLET, EXTENDED RELEASE ORAL at 08:40

## 2024-07-28 RX ADMIN — CHLORHEXIDINE GLUCONATE 15 ML: 1.2 RINSE ORAL at 21:17

## 2024-07-28 RX ADMIN — HEPARIN SODIUM 5000 UNITS: 5000 INJECTION INTRAVENOUS; SUBCUTANEOUS at 05:22

## 2024-07-28 RX ADMIN — OXYCODONE HYDROCHLORIDE 5 MG: 5 TABLET ORAL at 03:28

## 2024-07-28 RX ADMIN — POTASSIUM CHLORIDE 40 MEQ: 1500 TABLET, EXTENDED RELEASE ORAL at 08:39

## 2024-07-28 RX ADMIN — SODIUM BICARBONATE 650 MG TABLET 650 MG: at 18:05

## 2024-07-28 RX ADMIN — HEPARIN SODIUM 5000 UNITS: 5000 INJECTION INTRAVENOUS; SUBCUTANEOUS at 13:11

## 2024-07-28 RX ADMIN — PRAVASTATIN SODIUM 80 MG: 80 TABLET ORAL at 18:05

## 2024-07-28 NOTE — PHYSICAL THERAPY NOTE
PHYSICAL THERAPY NOTE          Patient Name: Anamaria Parnell  Today's Date: 7/28/2024 07/28/24 1542   Note Type   Note Type Treatment   Pain Assessment   Pain Assessment Tool 0-10   Pain Score No Pain   Restrictions/Precautions   RLE Weight Bearing Per Order WBAT   Braces or Orthoses   (surgical shoe R le.)   Other Precautions Cognitive;Chair Alarm;Multiple lines;Fall Risk  (wound vac)   General   Chart Reviewed Yes   Family/Caregiver Present No   Cognition   Arousal/Participation Alert   Attention Attends with cues to redirect   Orientation Level Oriented X4   Memory Decreased recall of precautions   Following Commands Follows one step commands with increased time or repetition   Subjective   Subjective I am so weak.  I was in bed for 2 weeks.   Bed Mobility   Additional Comments pt  out of bed in recliner   Transfers   Sit to Stand   (initally mod assist x2 progressing to min assist x2 from  lower surfaces  min assist x1 from hihger surfaces.  verbal cues for hand placement)   Additional items Assist x 2;Assist x 1;Armrests;Increased time required;Verbal cues   Stand to Sit 4  Minimal assistance   Additional items Assist x 2;Armrests;Increased time required;Verbal cues   Toilet transfer 4  Minimal assistance   Additional items Assist x 1;Bedrails;Increased time required;Verbal cues;Commode   Additional Comments verbal cues for hand placement   Ambulation/Elevation   Gait pattern Improper Weight shift;Antalgic;Decreased foot clearance;Short stride;Step to;Excessively slow;Decreased R stance   Gait Assistance 3  Moderate assist   Additional items Assist x 2;Assist x 1;Verbal cues  (mod assist x2 progressing to mod assist x1.)   Assistive Device Rolling walker   Distance 5' x1, 4' x1   Ambulation/Elevation Additional Comments verbal cues for b/l le sequencing, verbal cue sof turning and backing up to [facility] or reciner.   Balance    Static Sitting Fair +   Dynamic Sitting Fair   Static Standing Poor +   Dynamic Standing Poor   Ambulatory Poor   Activity Tolerance   Activity Tolerance Patient limited by fatigue   Medical Staff Made Aware [place] youngblood [place]   Nurse Made Aware yes   Exercises   Knee AROM Long Arc Quad Sitting;10 reps;AROM;Bilateral   Ankle Pumps Sitting;AROM;Bilateral  (x 12 reps)   Marching Sitting;AROM;Bilateral;20 reps  (x 12 reps.)   Assessment   Prognosis Fair   Problem List Decreased strength;Decreased endurance;Impaired balance;Decreased mobility;Decreased skin integrity;Obesity;Decreased safety awareness   Assessment Pt seen for PT treatment session this date with interventions consisting of transfer training, gait training, and HEP, and education provided as needed for safety and direction to improve functional mobility, safety awareness, and activity tolerance. Pt agreeable to PT treatment session upon arrival, pt found out of bed in recliner. At end of session, pt left out of bed in recliner with all needs in reach. In comparison to previous session, pt with improvement in activity tolerance, endurance, standing balance, ambulation distances, and ambulatory balance. Pt  was able to progress with ambulation distances to 4' x1 and 5' x2 with use of Rw and mod assist x2 progressing to mod assist x1. Pt  requires verbal cues for proper le sequencing,  poor carryover noted with proper le sequencing techniques and transfer technique. Pt  requires mod- min assist x2 to transfer from lower surfaces. Pt  performs sit<> stand transfers to and from higher surfaces with min assist x1, verbal cues for hand placement required .  Pt  performs seated b/l le arom exercises  x 10- 12 reps. To tolerance. Pt is requiring increased assistance for transfers and ambulation and is functioning well below baseline level of mobility.   Continue to recommend  level II moderate rehab resource intensity  at time of d/c in order to maximize pt's  functional independence and safety w/ mobility. Pt continues to be functioning below baseline level. PT will continue to see pt while here in order to address the deficits listed above and provide interventions consistent w/ POC in effort to achieve STGs.    The patient's [facility] Basic Mobility Inpatient Short Form Raw Score is 12. A raw score less than 16 suggests the patient may benefit from discharge to post-acute rehabilitation services. Please also refer to the recommendation of the Physical Therapist for safe discharge planning.   Goals   Patient Goals to get stronger   STG Expiration Date 08/05/24   PT Treatment Day 1   Plan   Treatment/Interventions Functional transfer training;LE strengthening/ROM;Therapeutic exercise;Endurance training;Patient/family training;Equipment eval/education;Gait training;Spoke to nursing   Progress Slow progress, decreased activity tolerance   PT Frequency 1-2x/wk   Discharge Recommendation   Rehab Resource Intensity Level, PT II (Moderate Resource Intensity)   AM-PAC Basic Mobility Inpatient   Turning in Flat Bed Without Bedrails 2   Lying on Back to Sitting on Edge of Flat Bed Without Bedrails 2   Moving Bed to Chair 3   Standing Up From Chair Using Arms 2   Walk in Room 2   Climb 3-5 Stairs With Railing 1   Basic Mobility Inpatient Raw Score 12   Basic Mobility Standardized Score 32.23   [facility] Highest Level Of Mobility   -[facility] Goal 4: Move to chair/commode   -HLM Achieved 6: Walk 10 steps or more  (total combined distances > 10')   Education   Education Provided Mobility training;Home exercise program;Assistive device   Patient Reinforcement needed   End of Consult   Patient Position at End of Consult Bedside chair;Bed/Chair alarm activated;All needs within reach   End of Consult Comments b/l le's elevated on leg rest.      07/28/24 0175   Note Type   Note Type Treatment   Pain Assessment   Pain Assessment Tool 0-10   Pain Score No Pain   Restrictions/Precautions    RLE Weight Bearing Per Order WBAT   Braces or Orthoses   (surgical shoe R le.)   Other Precautions Cognitive;Chair Alarm;Multiple lines;Fall Risk  (wound vac)   General   Chart Reviewed Yes   Family/Caregiver Present No   Cognition   Arousal/Participation Alert   Attention Attends with cues to redirect   Orientation Level Oriented X4   Memory Decreased recall of precautions   Following Commands Follows one step commands with increased time or repetition   Subjective   Subjective I am so weak.  I was in bed for 2 weeks.   Bed Mobility   Additional Comments pt  out of bed in recliner   Transfers   Sit to Stand   (initally mod assist x2 progressing to min assist x2 from  lower surfaces  min assist x1 from hihger surfaces.  verbal cues for hand placement)   Additional items Assist x 2;Assist x 1;Armrests;Increased time required;Verbal cues   Stand to Sit 4  Minimal assistance   Additional items Assist x 2;Armrests;Increased time required;Verbal cues   Toilet transfer 4  Minimal assistance   Additional items Assist x 1;Bedrails;Increased time required;Verbal cues;Commode   Additional Comments verbal cues for hand placement   Ambulation/Elevation   Gait pattern Improper Weight shift;Antalgic;Decreased foot clearance;Short stride;Step to;Excessively slow;Decreased R stance   Gait Assistance 3  Moderate assist   Additional items Assist x 2;Assist x 1;Verbal cues  (mod assist x2 progressing to mod assist x1.)   Assistive Device Rolling walker   Distance 5' x1, 4' x1   Ambulation/Elevation Additional Comments verbal cues for b/l le sequencing, verbal cue sof turning and backing up to BSC or reciner.   Balance   Static Sitting Fair +   Dynamic Sitting Fair   Static Standing Poor +   Dynamic Standing Poor   Ambulatory Poor   Activity Tolerance   Activity Tolerance Patient limited by fatigue   Medical Staff Made Aware rn[place]eara [place]   Nurse Made Aware yes   Exercises   Knee AROM Long Arc Quad Sitting;10 reps;AROM;Bilateral    Ankle Pumps Sitting;AROM;Bilateral  (x 12 reps)   Marching Sitting;AROM;Bilateral;20 reps  (x 12 reps.)   Assessment   Prognosis Fair   Problem List Decreased strength;Decreased endurance;Impaired balance;Decreased mobility;Decreased skin integrity;Obesity;Decreased safety awareness   Assessment Pt seen for PT treatment session this date with interventions consisting of transfer training, gait training, and HEP, and education provided as needed for safety and direction to improve functional mobility, safety awareness, and activity tolerance. Pt agreeable to PT treatment session upon arrival, pt found out of bed in recliner. At end of session, pt left out of bed in recliner with all needs in reach. In comparison to previous session, pt with improvement in activity tolerance, endurance, standing balance, ambulation distances, and ambulatory balance. Pt  was able to progress with ambulation distances to 4' x1 and 5' x2 with use of Rw and mod assist x2 progressing to mod assist x1. Pt  requires verbal cues for proper le sequencing,  poor carryover noted with proper le sequencing techniques and transfer technique. Pt  requires mod- min assist x2 to transfer from lower surfaces. Pt  performs sit<> stand transfers to and from higher surfaces with min assist x1, verbal cues for hand placement required .  Pt  performs seated b/l le arom exercises  x 10- 12 reps. To tolerance. Pt is requiring increased assistance for transfers and ambulation and is functioning well below baseline level of mobility.   Continue to recommend  level II moderate rehab resource intensity  at time of d/c in order to maximize pt's functional independence and safety w/ mobility. Pt continues to be functioning below baseline level. PT will continue to see pt while here in order to address the deficits listed above and provide interventions consistent w/ POC in effort to achieve STGs.    The patient's AM-PAC Basic Mobility Inpatient Short Form Raw  Score is 12. A raw score less than 16 suggests the patient may benefit from discharge to post-acute rehabilitation services. Please also refer to the recommendation of the Physical Therapist for safe discharge planning.   Goals   Patient Goals to get stronger   STG Expiration Date 08/05/24   PT Treatment Day 1   Plan   Treatment/Interventions Functional transfer training;LE strengthening/ROM;Therapeutic exercise;Endurance training;Patient/family training;Equipment eval/education;Gait training;Spoke to nursing   Progress Slow progress, decreased activity tolerance   PT Frequency 1-2x/wk   Discharge Recommendation   Rehab Resource Intensity Level, PT II (Moderate Resource Intensity)   AM-PAC Basic Mobility Inpatient   Turning in Flat Bed Without Bedrails 2   Lying on Back to Sitting on Edge of Flat Bed Without Bedrails 2   Moving Bed to Chair 3   Standing Up From Chair Using Arms 2   Walk in Room 2   Climb 3-5 Stairs With Railing 1   Basic Mobility Inpatient Raw Score 12   Basic Mobility Standardized Score 32.23   [facility] Highest Level Of Mobility   -[facility] Goal 4: Move to chair/commode   -HLM Achieved 6: Walk 10 steps or more  (total combined distances > 10')   Education   Education Provided Mobility training;Home exercise program;Assistive device   Patient Reinforcement needed   End of Consult   Patient Position at End of Consult Bedside chair;Bed/Chair alarm activated;All needs within reach   End of Consult Comments b/l le's elevated on leg rest.   Prabha Okeefe, PTA

## 2024-07-28 NOTE — PLAN OF CARE
Problem: PAIN - ADULT  Goal: Verbalizes/displays adequate comfort level or baseline comfort level  Description: Interventions:  - Encourage patient to monitor pain and request assistance  - Assess pain using appropriate pain scale  - Administer analgesics based on type and severity of pain and evaluate response  - Implement non-pharmacological measures as appropriate and evaluate response  - Consider cultural and social influences on pain and pain management  - Notify physician/advanced practitioner if interventions unsuccessful or patient reports new pain  Outcome: Progressing     Problem: INFECTION - ADULT  Goal: Absence or prevention of progression during hospitalization  Description: INTERVENTIONS:  - Assess and monitor for signs and symptoms of infection  - Monitor lab/diagnostic results  - Monitor all insertion sites, i.e. indwelling lines, tubes, and drains  - Monitor endotracheal if appropriate and nasal secretions for changes in amount and color  - [place] appropriate cooling/warming therapies per order  - Administer medications as ordered  - Instruct and encourage patient and family to use good hand hygiene technique  - Identify and instruct in appropriate isolation precautions for identified infection/condition  Outcome: Progressing     Problem: SAFETY ADULT  Goal: Patient will remain free of falls  Description: INTERVENTIONS:  - Educate patient/family on patient safety including physical limitations  - Instruct patient to call for assistance with activity   - Consult OT/PT to assist with strengthening/mobility   - Keep Call bell within reach  - Keep bed low and locked with side rails adjusted as appropriate  - Keep care items and personal belongings within reach  - Initiate and maintain comfort rounds  - Make Fall Risk Sign visible to staff  - Offer Toileting every 2 Hours, in advance of need  - Initiate/Maintain bed alarm  - Apply yellow socks and bracelet for high fall risk patients  - Consider  moving patient to room near nurses station  Outcome: Progressing  Goal: Maintain or return to baseline ADL function  Description: INTERVENTIONS:  -  Assess patient's ability to carry out ADLs; assess patient's baseline for ADL function and identify physical deficits which impact ability to perform ADLs (bathing, care of mouth/teeth, toileting, grooming, dressing, etc.)  - Assess/evaluate cause of self-care deficits   - Assess range of motion  - Assess patient's mobility; develop plan if impaired  - Assess patient's need for assistive devices and provide as appropriate  - Encourage maximum independence but intervene and supervise when necessary  - Involve family in performance of ADLs  - Assess for home care needs following discharge   - Consider OT consult to assist with ADL evaluation and planning for discharge  - Provide patient education as appropriate  Outcome: Progressing  Goal: Maintains/Returns to pre admission functional level  Description: INTERVENTIONS:  - Perform AM-PAC 6 Click Basic Mobility/ Daily Activity assessment daily.  - Set and communicate daily mobility goal to care team and patient/family/caregiver.   - Collaborate with rehabilitation services on mobility goals if consulted  - Stand patient 3 times a day  - Ambulate patient 3 times a day  - Out of bed to chair 3 times a day   - Out of bed for meals 3 times a day  - Out of bed for toileting  - Record patient progress and toleration of activity level   Outcome: Progressing     Problem: DISCHARGE PLANNING  Goal: Discharge to home or other facility with appropriate resources  Description: INTERVENTIONS:  - Identify barriers to discharge w/patient and caregiver  - Arrange for needed discharge resources and transportation as appropriate  - Identify discharge learning needs (meds, wound care, etc.)  - Arrange for interpretive services to assist at discharge as needed  - Refer to Case Management Department for coordinating discharge planning if the  patient needs post-hospital services based on physician/advanced practitioner order or complex needs related to functional status, cognitive ability, or social support system  Outcome: Progressing     Problem: Knowledge Deficit  Goal: Patient/family/caregiver demonstrates understanding of disease process, treatment plan, medications, and discharge instructions  Description: Complete learning assessment and assess knowledge base.  Interventions:  - Provide teaching at level of understanding  - Provide teaching via preferred learning methods  Outcome: Progressing     Problem: CARDIOVASCULAR - ADULT  Goal: Maintains optimal cardiac output and hemodynamic stability  Description: INTERVENTIONS:  - Monitor I/O, vital signs and rhythm  - Monitor for S/S and trends of decreased cardiac output  - Administer and titrate ordered vasoactive medications to optimize hemodynamic stability  - Assess quality of pulses, skin color and temperature  - Assess for signs of decreased coronary artery perfusion  - Instruct patient to report change in severity of symptoms  Outcome: Progressing     Problem: METABOLIC, FLUID AND ELECTROLYTES - ADULT  Goal: Electrolytes maintained within normal limits  Description: INTERVENTIONS:  - Monitor labs and assess patient for signs and symptoms of electrolyte imbalances  - Administer electrolyte replacement as ordered  - Monitor response to electrolyte replacements, including repeat lab results as appropriate  - Instruct patient on fluid and nutrition as appropriate  Outcome: Progressing  Goal: Fluid balance maintained  Description: INTERVENTIONS:  - Monitor labs   - Monitor I/O and WT  - Instruct patient on fluid and nutrition as appropriate  - Assess for signs & symptoms of volume excess or deficit  Outcome: Progressing  Goal: Glucose maintained within target range  Description: INTERVENTIONS:  - Monitor Blood Glucose as ordered  - Assess for signs and symptoms of hyperglycemia and hypoglycemia  -  Administer ordered medications to maintain glucose within target range  - Assess nutritional intake and initiate nutrition service referral as needed  Outcome: Progressing     Problem: HEMATOLOGIC - ADULT  Goal: Maintains hematologic stability  Description: INTERVENTIONS  - Assess for signs and symptoms of bleeding or hemorrhage  - Monitor labs  - Administer supportive blood products/factors as ordered and appropriate  Outcome: Progressing     Problem: MUSCULOSKELETAL - ADULT  Goal: Maintain or return mobility to safest level of function  Description: INTERVENTIONS:  - Assess patient's ability to carry out ADLs; assess patient's baseline for ADL function and identify physical deficits which impact ability to perform ADLs (bathing, care of mouth/teeth, toileting, grooming, dressing, etc.)  - Assess/evaluate cause of self-care deficits   - Assess range of motion  - Assess patient's mobility  - Assess patient's need for assistive devices and provide as appropriate  - Encourage maximum independence but intervene and supervise when necessary  - Involve family in performance of ADLs  - Assess for home care needs following discharge   - Consider OT consult to assist with ADL evaluation and planning for discharge  - Provide patient education as appropriate  Outcome: Progressing  Goal: Maintain proper alignment of affected body part  Description: INTERVENTIONS:  - Support, maintain and protect limb and body alignment  - Provide patient/ family with appropriate education  Outcome: Progressing     Problem: Prexisting or High Potential for Compromised Skin Integrity  Goal: Skin integrity is maintained or improved  Description: INTERVENTIONS:  - Identify patients at risk for skin breakdown  - Assess and monitor skin integrity  - Assess and monitor nutrition and hydration status  - Monitor labs   - Assess for incontinence   - Turn and reposition patient  - Assist with mobility/ambulation  - Relieve pressure over bony  prominences  - Avoid friction and shearing  - Provide appropriate hygiene as needed including keeping skin clean and dry  - Evaluate need for skin moisturizer/barrier cream  - Collaborate with interdisciplinary team   - Patient/family teaching  - Consider wound care consult   Outcome: Progressing     Problem: Nutrition/Hydration-ADULT  Goal: Nutrient/Hydration intake appropriate for improving, restoring or maintaining nutritional needs  Description: Monitor and assess patient's nutrition/hydration status for malnutrition. Collaborate with interdisciplinary team and initiate plan and interventions as ordered.  Monitor patient's weight and dietary intake as ordered or per policy. Utilize nutrition screening tool and intervene as necessary. Determine patient's food preferences and provide high-protein, high-caloric foods as appropriate.     INTERVENTIONS:  - Monitor oral intake, urinary output, labs, and treatment plans  - Assess nutrition and hydration status and recommend course of action  - Evaluate amount of meals eaten  - Assist patient with eating if necessary   - Allow adequate time for meals  - Recommend/ encourage appropriate diets, oral nutritional supplements, and vitamin/mineral supplements  - Order, calculate, and assess calorie counts as needed  - Recommend, monitor, and adjust tube feedings and TPN/PPN based on assessed needs  - Assess need for intravenous fluids  - Provide specific nutrition/hydration education as appropriate  - Include patient/family/caregiver in decisions related to nutrition  Outcome: Progressing     Problem: NEUROSENSORY - ADULT  Goal: Achieves maximal functionality and self care  Description: INTERVENTIONS  - Monitor swallowing and airway patency with patient fatigue and changes in neurological status  - Encourage and assist patient to increase activity and self care.   - Encourage visually impaired, hearing impaired and aphasic patients to use assistive/communication  devices  Outcome: Progressing

## 2024-07-28 NOTE — ASSESSMENT & PLAN NOTE
Lab Results   Component Value Date    HGBA1C 5.9 (H) 07/09/2024     Recent Labs     07/27/24  1618 07/27/24 2054 07/28/24  0740 07/28/24  1130   POCGLU 179* 173* 156* 148*       Home regimen: Tradjenta  Inpatient regimen: Sliding scale

## 2024-07-28 NOTE — ASSESSMENT & PLAN NOTE
PAD and aortic disease, R CLTI and R great toe dry gangrene. S/P Bilateral femoral endarterectomy with L to R fem-fem PTFE bypass and retrograde L JACI, L EIA stenting, bilateral groin VAC placement on 7/24   Intervention planned for friday

## 2024-07-28 NOTE — ASSESSMENT & PLAN NOTE
80 yo female ex-smoker w/ a hx of obesity, CKD IV, DM II w/ peripheral neuropathy, HLD, HTN, CAD, L MCA/PCA CVA S/P L TCAR 9/7/23 ([place]), FÉLIX, mesenteric artery stenosis and aortoiliac occlusive disease w/ PAD and chronic R hallux dry gangrene presented to [facility] on 7/16/24 w/ worsening R great toe pain and R hallux gangrene. Pt seen in consult by nephrology who continues to follow pt for RICARDO on CKD. Pt was transferred to [facility] on 7/19 w/ plans for R femoral endarterectomy w/ retrograde stenting and RLE bypass. Pt seen in consult by podiatry for R hallux dry gangrene. Xray R foot (+) OM. Podiatry plans local wound care w/ re-eval for R hallux amputation after revascularization.    Vascular surgery consulted for possible revascularization options for known AIOD/PAD w/ CLTI and R hallux dry gangrene. Pt follows w/ vascular surgery for PAD and chronic limb ischemia; last seen in the office on 6/4/24. Imaging shows extensive AIOD and peripheral arterial disease with occlusion of the R iliac system, nearly occlusive R CFA, and occlusion of popliteal artery with recon of the below knee popliteal and tibials. R LISA: 0.11/-/-.     Pt is S/P B/L CFAE, L JACI/EIA balloon angio and shockwave lithotripsy w/ insertion of a L EIA drug-coated stent and L JACI VBX stent, L to R PTFE fem-fem bypass, and B/L groin vac placement on 7/24. Pt received 3U PRBC and 1U FFP intra-op. Plan to return to OR for R femoral to BK-pop bypass; tentatively scheduled for 8/2/24. Leukocytosis continues to worsen without any fevers or overt signs of infection from R toe or B/L groin sites. ID consult pending to eval possible need for continued ABX.    Diagnostics:  -7/29/24 LEAD: Right: Evidence of patent endarterectomy with diffuse disease noted throughout the femoral-popliteal arteries without significant focal stenosis. LISA: 0.53/48/22. Left: Evidence of patent endarterectomy with diffuse disease noted throughout the femoral-popliteal arteries without  significant focal stenosis. L LISA: 0.57/10/43  -7/29/24 Xray R foot: Increasing volume of air within the soft tissues of the first toe in this patient with provided history of gangrene. The previously described cortical destruction along the ventral aspect of the first distal phalanx is not well visualized on the current examination, likely attributed to differences in patient positioning.  -7/22/24 Xray R foot: Cortical destruction along the ventral aspect of the first distal phalanx worrisome for acute osteomyelitis. Large air within the overlying soft tissues.   -7/21/24 Echo: Left ventricular cavity size is normal. Wall thickness is severely increased. The left ventricular ejection fraction is 41% by biplane measurement. Systolic function is mildly reduced. Global longitudinal strain is reduced at -10% with moderate to severe strain reduction in the ED mid to basal anteroseptal septal and inferior walls.. Unable to grade diastolic dysfunction given the severe degree of mitral annular calcification.   -7/18/24 LE vein mapping: Right: The great saphenous vein is patent  from the groin to the ankle. The intraluminal diameter measurements range from 2.2mm to 6.4mm throughout. Left: The great saphenous vein is patent from the groin to the ankle. The intraluminal diameter measurements range from 1.3mm to 6.8mm throughout.  -7/18/24 CTA abd w/ runoff: Visualized descending thoracic and suprarenal abdominal aorta demonstrate marked soft plaque with multifocal regions of plaque ulceration. A point of relative stenosis within the infrarenal abdominal aorta measures up to 7 mm in diameter. Bilateral multifocal severe stenosis within the external iliac and common femoral arteries. Bilateral long segment SFA occlusion extending from the ostia to the level of the P1 popliteal artery. Three-vessel runoff on the right, the posterior tibial artery is dominant. Two-vessel runoff on the left, the peroneal artery is dominant.  Segmental visualization of the posterior tibial artery. Interval increase in size of an exophytic lesion arising medially from the stomach, again consistent with gastrointestinal stromal tumor. New dilation of the pancreatic duct within the pancreatic head, no obvious pancreatic/ampulla lesion identified.  -7/16/24 LEAD: Right: Severe diffuse disease noted throughout the femoral-popliteal arteries with high grade stenosis vs occlusion of the proximal-distal superficial femoral artery with reconstitution to the popliteal artery. Evidence suggestive of TIb/Peroneal occlusive disease. There is a high grade stenosis vs occlusion of the distal anterior tibial artery. R LISA: 0.11/-/-. Left: Severe diffuse disease noted throughout the femoral-popliteal arteries with high grade stenosis vs occlusion of the proximal-mid superficial femoral artery with reconstitution in the distal SFA. Evidence suggestive of TIb/Peroneal occlusive disease. There is high grade stenosis vs. occlusion of the entire posterior tibial artery. L LISA: 0.31/20/19.    Plan:   -Advanced calcific atherosclerotic AIOD/PAD w/ CTLI and R hallux dry gangrene (+) OM   -S/P B/L CFAE, L JACI/EIA balloon angio and shockwave lithotripsy w/ insertion of a L EIA drug-coated stent and L JACI VBX stent, L to R PTFE fem-fem bypass, and B/L groin vac placement on 7/24 (POD #4)  -B/L groin wound vac intact w/ adequate suction  -BL DP/PT signals  -Plan for femoral to BK pop bypass in OR; scheduled for Fri (8/2)  -Pt completed a 10-day course of IV ancef on 7/25; however, WBC remains elevated w/ no surgical cure  -ID consult pending to eval for possible further need for ABX  -Podiatry following w/ plans to continue local wound care for now w/ re-eval after revascularization; input appreciated  -Cardiology following peripherally and will re-eval pt later next week for repeat cardiac risk assessment/optimization prior to planned OR on Friday; input appreciated  -ABLKATHRINE  secondary to recent surgery SP 3U PRBC and 1U FFP intra-op; Hgb 7.9 today  -Continue to monitor Hgb and transfuse < 7.0 per primary team  -Continue ASA, brilinta and pravastatin for medical management  -Nephrology following for RICARDO on CKD; input appreciated  -Continue medical management per primary team  -PT/OT follownig  -Will discuss w/ Dr. Peterson

## 2024-07-28 NOTE — PROGRESS NOTES
"Critical access hospital  Progress Note  Name: Anamaria Parnell I  MRN: [id number]  Unit/Bed#: [place] [address] MS [id number] I Date of Admission: 7/19/2024   Date of Service: 7/28/2024 I Hospital Day: 9    Assessment & Plan   * Gangrene (HCC)  Assessment & Plan  79-year-old female with history of PAD, CAD, diabetes, and hypertension who presented from the [facility] due to right great toe gangrene.  She was transferred here for vascular bypass. XR right foot showing cortical destruction along the ventral aspect of the first distal phalanx worrisome for acute osteomyelitis.     S/p Vascular surgery intervention 7/24/2024  Continue Aspirin Brillinta pravastatin  Patient has received a 10 day course in the ICU of cefazolin.  Infectious disease recommends monitoring off antibiotics for now  For amputation by podiatry after revascularization has been completed by vascular surgery temporarily planned for (Friday 8/2/2024)    Aortoiliac occlusive disease (HCC)  Assessment & Plan  PAD and aortic disease, R CLTI and R great toe dry gangrene. S/P Bilateral femoral endarterectomy with L to R fem-fem PTFE bypass and retrograde L JACI, L EIA stenting, bilateral groin VAC placement on 7/24   Intervention planned for friday    Acute renal failure superimposed on stage 4 chronic kidney disease (HCC)  Assessment & Plan  Baseline 1.4-1.7  RICARDO likely due to sepsis / pre-renal.  Management per nephrology. Torsemide restarted by nephrology due to volume overload.   Nephrology might have to be involved again prior to her planned intervention this Friday.    Recent Labs     07/26/24  0437 07/27/24  0501 07/28/24  0529   BUN 39* 38* 41*   CREATININE 1.92* 1.71* 1.59*   EGFR 24 28 31                 Paroxysmal atrial fibrillation (HCC)  Assessment & Plan  Converted spontaneously before she was placed on telemetry. ECG reviewed with cardiology.  Per cardiology: \"Given her anemia, DAPT for recent surgery, recent surgery and low A-fib " "burden, will hold off starting anticoagulation for now.\"     Primary hypertension  Assessment & Plan  Controlled  Continue amlodipine, clonidine, metoprolol    Type 2 diabetes mellitus, without long-term current use of insulin (HCC)  Assessment & Plan  Lab Results   Component Value Date    HGBA1C 5.9 (H) 2024     Recent Labs     24  1618 24  2054 24  0740 24  1130   POCGLU 179* 173* 156* 148*       Home regimen: Tradjenta  Inpatient regimen: Sliding scale      Depression, recurrent (HCC)  Assessment & Plan  Continue Lexapro    CVA (cerebral vascular accident) (HCC)  Assessment & Plan  History of CVA. MRI 2023 showing: Large area of acute to subacute ischemia left PCA distribution occipital lobe. Small foci of acute to subacute ischemia in the left frontal and left parietal/temporal lobes MCA distribution.  Continue Aspirin and statin           VTE Pharmacologic Prophylaxis:   Pharmacologic: Heparin  Mechanical VTE Prophylaxis in Place: Yes    Discussions with Specialists or Other Care Team Provider: vascular, cards    Education and Discussions with Family / Patient: patient    Current Length of Stay: 9 day(s)    Current Patient Status: Inpatient   Certification Statement: The patient will continue to require additional inpatient hospital stay due to vascular surgery intervention    Discharge Plan: active    Code Status: Level 3 - DNAR and DNI      Subjective:   Patient seen and examined at bedside. No new complaints overnight.     Objective:     Vitals:   Temp (24hrs), Av.9 °F (36.1 °C), Min:95 °F (35 °C), Max:97.4 °F (36.3 °C)    Temp:  [95 °F (35 °C)-97.4 °F (36.3 °C)] 97.1 °F (36.2 °C)  HR:  [70-92] 82  Resp:  [16-22] 17  BP: (106-140)/(50-74) 108/68  SpO2:  [84 %-98 %] 97 %  Body mass index is 32.44 kg/m².     Input and Output Summary (last 24 hours):       Intake/Output Summary (Last 24 hours) at 2024 1201  Last data filed at 2024 0945  Gross per 24 hour   Intake " 1180 ml   Output 450 ml   Net 730 ml       Physical Exam:     Physical Exam  Vitals reviewed.   HENT:      Head: Normocephalic.      Nose: Nose normal.      Mouth/Throat:      Mouth: Mucous membranes are moist.   Eyes:      General: No scleral icterus.  Cardiovascular:      Rate and Rhythm: Normal rate.   Pulmonary:      Effort: Pulmonary effort is normal. No respiratory distress.   Abdominal:      Palpations: Abdomen is soft.      Tenderness: There is no abdominal tenderness.   Genitourinary:     Comments: Vac in place  Musculoskeletal:      Comments: Right foot dressing in place   Skin:     General: Skin is warm.   Neurological:      Mental Status: She is alert. Mental status is at baseline.   Psychiatric:         Mood and Affect: Mood normal.         Behavior: Behavior normal.       Additional Data:     Labs:    Results from last 7 days   Lab Units 07/28/24  0529 07/27/24  0501 07/25/24  0436 07/24/24  1939   WBC Thousand/uL 28.95* 24.68*   < > 32.36*   HEMOGLOBIN g/dL 7.9* 8.2*   < > 11.0*   HEMATOCRIT % 24.1* 24.6*   < > 33.1*   PLATELETS Thousands/uL 360 307   < > 293   BANDS PCT %  --   --   --  2   SEGS PCT %  --  82*  --   --    LYMPHO PCT %  --  5*  --  4*   MONO PCT %  --  7  --  3*   EOS PCT %  --  0  --  1    < > = values in this interval not displayed.     Results from last 7 days   Lab Units 07/28/24  0529 07/25/24  0436 07/24/24  1939   SODIUM mmol/L 141   < > 141   POTASSIUM mmol/L 3.4*   < > 5.3   CHLORIDE mmol/L 107   < > 114*   CO2 mmol/L 24   < > 17*   BUN mg/dL 41*   < > 25   CREATININE mg/dL 1.59*   < > 1.41*   ANION GAP mmol/L 10   < > 10   CALCIUM mg/dL 8.2*   < > 8.8   ALBUMIN g/dL  --   --  2.7*   TOTAL BILIRUBIN mg/dL  --   --  0.48   ALK PHOS U/L  --   --  74   ALT U/L  --   --  3*   AST U/L  --   --  15   GLUCOSE RANDOM mg/dL 150*   < > 228*    < > = values in this interval not displayed.     Results from last 7 days   Lab Units 07/24/24  1504   INR  1.51*     Results from last 7 days    Lab Units 07/28/24  1130 07/28/24  0740 07/27/24  2054 07/27/24  1618 07/27/24  1152 07/27/24  0731 07/26/24  2107 07/26/24  1639 07/26/24  1115 07/26/24  0722 07/25/24  2118 07/25/24  1545   POC GLUCOSE mg/dl 148* 156* 173* 179* 164* 165* 185* 134 162* 159* 179* 165*         Results from last 7 days   Lab Units 07/24/24  1939   LACTIC ACID mmol/L 1.5           * I Have Reviewed All Lab Data Listed Above.  * Additional Pertinent Lab Tests Reviewed: All Labs For Current Hospital Admission Reviewed    Mobility:  Basic Mobility Inpatient Raw Score: 11  [facility] Goal: 4: Move to chair/commode  [facility] Achieved: 4: Move to chair/commode    Lines:   Invasive Devices       Peripheral Intravenous Line  Duration             Peripheral IV 07/25/24 Left Forearm 3 days    Peripheral IV 07/27/24 Right;Ventral (anterior) Forearm 1 day                       Imaging:    Imaging Reports Reviewed Today Include: no new imaging    Recent Cultures (last 7 days):           Last 24 Hours Medication List:   Current Facility-Administered Medications   Medication Dose Route Frequency Provider Last Rate    acetaminophen  975 mg Oral Q8H [facility] Harvey Rey, DO      allopurinol  100 mg Oral Daily Harvey Rey, DO      aspirin  81 mg Oral Daily Harvey Rey, DO      chlorhexidine  15 mL Mouth/Throat Q12H [facility] Milly Fatima PA-C      cloNIDine  1 patch Transdermal Weekly Harvey Rey, DO      escitalopram  20 mg Oral Daily Harvey Rey, DO      heparin (porcine)  5,000 Units Subcutaneous Q8H [facility] Harvey Rey, DO      HYDROmorphone  0.5 mg Intravenous Q5 Min PRN Mp Nielsen, DO      HYDROmorphone  0.2 mg Intravenous Q6H PRN Harvey Rey, DO      insulin lispro  1-5 Units Subcutaneous TID  Harvey Rey, DO      lidocaine  1 patch Topical Daily Harvey Rey, DO      LORazepam  0.5 mg Oral Q8H PRN Harvey Rey, DO      metoprolol succinate  100 mg Oral Daily Harvey Rey, DO      ondansetron  4 mg Intravenous Q6H PRN Harvey Rey, DO       oxyCODONE  5 mg Oral Q6H PRN Harvey Rey DO      pravastatin  80 mg Oral Daily With Dinner Harvey Rey, DO      sodium bicarbonate  650 mg Oral BID after meals Harvey Rey,       ticagrelor  90 mg Oral Q12H JAVIER Harvey Rye, DO      torsemide  10 mg Oral Daily Joselyn Reyes Bahamonde, MD      traZODone  50 mg Oral HS Harvey Rey DO          Today, Patient Was Seen By: Mp Perez MD    ** Please Note: Dictation voice to text software may have been used in the creation of this document. **

## 2024-07-28 NOTE — PLAN OF CARE
Problem: PAIN - ADULT  Goal: Verbalizes/displays adequate comfort level or baseline comfort level  Description: Interventions:  - Encourage patient to monitor pain and request assistance  - Assess pain using appropriate pain scale  - Administer analgesics based on type and severity of pain and evaluate response  - Implement non-pharmacological measures as appropriate and evaluate response  - Consider cultural and social influences on pain and pain management  - Notify physician/advanced practitioner if interventions unsuccessful or patient reports new pain  7/28/2024 1612 by Keara Traore RN  Outcome: Progressing  7/28/2024 1611 by Keara Traore RN  Outcome: Progressing     Problem: INFECTION - ADULT  Goal: Absence or prevention of progression during hospitalization  Description: INTERVENTIONS:  - Assess and monitor for signs and symptoms of infection  - Monitor lab/diagnostic results  - Monitor all insertion sites, i.e. indwelling lines, tubes, and drains  - Monitor endotracheal if appropriate and nasal secretions for changes in amount and color  - [place] appropriate cooling/warming therapies per order  - Administer medications as ordered  - Instruct and encourage patient and family to use good hand hygiene technique  - Identify and instruct in appropriate isolation precautions for identified infection/condition  7/28/2024 1612 by Keara Traore RN  Outcome: Progressing  7/28/2024 1611 by Keara Traore RN  Outcome: Progressing     Problem: SAFETY ADULT  Goal: Patient will remain free of falls  Description: INTERVENTIONS:  - Educate patient/family on patient safety including physical limitations  - Instruct patient to call for assistance with activity   - Consult OT/PT to assist with strengthening/mobility   - Keep Call bell within reach  - Keep bed low and locked with side rails adjusted as appropriate  - Keep care items and personal belongings within reach  - Initiate and maintain comfort rounds  - Make Fall Risk  Sign visible to staff  - Offer Toileting every 2 Hours, in advance of need  - Initiate/Maintain bed alarm  - Apply yellow socks and bracelet for high fall risk patients  - Consider moving patient to room near nurses station  7/28/2024 1612 by Keara Traore RN  Outcome: Progressing  7/28/2024 1611 by Keara Traore RN  Outcome: Progressing  Goal: Maintain or return to baseline ADL function  Description: INTERVENTIONS:  -  Assess patient's ability to carry out ADLs; assess patient's baseline for ADL function and identify physical deficits which impact ability to perform ADLs (bathing, care of mouth/teeth, toileting, grooming, dressing, etc.)  - Assess/evaluate cause of self-care deficits   - Assess range of motion  - Assess patient's mobility; develop plan if impaired  - Assess patient's need for assistive devices and provide as appropriate  - Encourage maximum independence but intervene and supervise when necessary  - Involve family in performance of ADLs  - Assess for home care needs following discharge   - Consider OT consult to assist with ADL evaluation and planning for discharge  - Provide patient education as appropriate  7/28/2024 1612 by Keara Traore RN  Outcome: Progressing  7/28/2024 1611 by Keara Traore RN  Outcome: Progressing  Goal: Maintains/Returns to pre admission functional level  Description: INTERVENTIONS:  - Perform AM-PAC 6 Click Basic Mobility/ Daily Activity assessment daily.  - Set and communicate daily mobility goal to care team and patient/family/caregiver.   - Collaborate with rehabilitation services on mobility goals if consulted  - Stand patient 3 times a day  - Ambulate patient 3 times a day  - Out of bed to chair 3 times a day   - Out of bed for meals 3 times a day  - Out of bed for toileting  - Record patient progress and toleration of activity level   7/28/2024 1612 by Keara Traore RN  Outcome: Progressing  7/28/2024 1611 by Keara Traore RN  Outcome: Progressing     Problem: DISCHARGE  PLANNING  Goal: Discharge to home or other facility with appropriate resources  Description: INTERVENTIONS:  - Identify barriers to discharge w/patient and caregiver  - Arrange for needed discharge resources and transportation as appropriate  - Identify discharge learning needs (meds, wound care, etc.)  - Arrange for interpretive services to assist at discharge as needed  - Refer to Case Management Department for coordinating discharge planning if the patient needs post-hospital services based on physician/advanced practitioner order or complex needs related to functional status, cognitive ability, or social support system  7/28/2024 1612 by Keara Traore RN  Outcome: Progressing  7/28/2024 1611 by Keara Traore RN  Outcome: Progressing     Problem: Knowledge Deficit  Goal: Patient/family/caregiver demonstrates understanding of disease process, treatment plan, medications, and discharge instructions  Description: Complete learning assessment and assess knowledge base.  Interventions:  - Provide teaching at level of understanding  - Provide teaching via preferred learning methods  7/28/2024 1612 by Keara Traore RN  Outcome: Progressing  7/28/2024 1611 by Keara Traore RN  Outcome: Progressing     Problem: CARDIOVASCULAR - ADULT  Goal: Maintains optimal cardiac output and hemodynamic stability  Description: INTERVENTIONS:  - Monitor I/O, vital signs and rhythm  - Monitor for S/S and trends of decreased cardiac output  - Administer and titrate ordered vasoactive medications to optimize hemodynamic stability  - Assess quality of pulses, skin color and temperature  - Assess for signs of decreased coronary artery perfusion  - Instruct patient to report change in severity of symptoms  7/28/2024 1612 by Keara Traore RN  Outcome: Progressing  7/28/2024 1611 by Keara Traore RN  Outcome: Progressing     Problem: METABOLIC, FLUID AND ELECTROLYTES - ADULT  Goal: Electrolytes maintained within normal limits  Description:  INTERVENTIONS:  - Monitor labs and assess patient for signs and symptoms of electrolyte imbalances  - Administer electrolyte replacement as ordered  - Monitor response to electrolyte replacements, including repeat lab results as appropriate  - Instruct patient on fluid and nutrition as appropriate  7/28/2024 1612 by Keara Traore RN  Outcome: Progressing  7/28/2024 1611 by Keara Traore RN  Outcome: Progressing  Goal: Fluid balance maintained  Description: INTERVENTIONS:  - Monitor labs   - Monitor I/O and WT  - Instruct patient on fluid and nutrition as appropriate  - Assess for signs & symptoms of volume excess or deficit  7/28/2024 1612 by Keara Traore RN  Outcome: Progressing  7/28/2024 1611 by Keara Traore RN  Outcome: Progressing  Goal: Glucose maintained within target range  Description: INTERVENTIONS:  - Monitor Blood Glucose as ordered  - Assess for signs and symptoms of hyperglycemia and hypoglycemia  - Administer ordered medications to maintain glucose within target range  - Assess nutritional intake and initiate nutrition service referral as needed  7/28/2024 1612 by Keara Traore RN  Outcome: Progressing  7/28/2024 1611 by Keara Traore RN  Outcome: Progressing     Problem: HEMATOLOGIC - ADULT  Goal: Maintains hematologic stability  Description: INTERVENTIONS  - Assess for signs and symptoms of bleeding or hemorrhage  - Monitor labs  - Administer supportive blood products/factors as ordered and appropriate  7/28/2024 1612 by Keara Traore RN  Outcome: Progressing  7/28/2024 1611 by Keara Traore RN  Outcome: Progressing     Problem: MUSCULOSKELETAL - ADULT  Goal: Maintain or return mobility to safest level of function  Description: INTERVENTIONS:  - Assess patient's ability to carry out ADLs; assess patient's baseline for ADL function and identify physical deficits which impact ability to perform ADLs (bathing, care of mouth/teeth, toileting, grooming, dressing, etc.)  - Assess/evaluate cause of self-care  deficits   - Assess range of motion  - Assess patient's mobility  - Assess patient's need for assistive devices and provide as appropriate  - Encourage maximum independence but intervene and supervise when necessary  - Involve family in performance of ADLs  - Assess for home care needs following discharge   - Consider OT consult to assist with ADL evaluation and planning for discharge  - Provide patient education as appropriate  7/28/2024 1612 by Keara Traore RN  Outcome: Progressing  7/28/2024 1611 by Keara Traore RN  Outcome: Progressing  Goal: Maintain proper alignment of affected body part  Description: INTERVENTIONS:  - Support, maintain and protect limb and body alignment  - Provide patient/ family with appropriate education  7/28/2024 1612 by Keara Traore RN  Outcome: Progressing  7/28/2024 1611 by Keara Traore RN  Outcome: Progressing     Problem: Prexisting or High Potential for Compromised Skin Integrity  Goal: Skin integrity is maintained or improved  Description: INTERVENTIONS:  - Identify patients at risk for skin breakdown  - Assess and monitor skin integrity  - Assess and monitor nutrition and hydration status  - Monitor labs   - Assess for incontinence   - Turn and reposition patient  - Assist with mobility/ambulation  - Relieve pressure over bony prominences  - Avoid friction and shearing  - Provide appropriate hygiene as needed including keeping skin clean and dry  - Evaluate need for skin moisturizer/barrier cream  - Collaborate with interdisciplinary team   - Patient/family teaching  - Consider wound care consult   7/28/2024 1612 by Keara Traore RN  Outcome: Progressing  7/28/2024 1611 by Keara Traore RN  Outcome: Progressing     Problem: Nutrition/Hydration-ADULT  Goal: Nutrient/Hydration intake appropriate for improving, restoring or maintaining nutritional needs  Description: Monitor and assess patient's nutrition/hydration status for malnutrition. Collaborate with interdisciplinary team and  initiate plan and interventions as ordered.  Monitor patient's weight and dietary intake as ordered or per policy. Utilize nutrition screening tool and intervene as necessary. Determine patient's food preferences and provide high-protein, high-caloric foods as appropriate.     INTERVENTIONS:  - Monitor oral intake, urinary output, labs, and treatment plans  - Assess nutrition and hydration status and recommend course of action  - Evaluate amount of meals eaten  - Assist patient with eating if necessary   - Allow adequate time for meals  - Recommend/ encourage appropriate diets, oral nutritional supplements, and vitamin/mineral supplements  - Order, calculate, and assess calorie counts as needed  - Recommend, monitor, and adjust tube feedings and TPN/PPN based on assessed needs  - Assess need for intravenous fluids  - Provide specific nutrition/hydration education as appropriate  - Include patient/family/caregiver in decisions related to nutrition  7/28/2024 1612 by Keara Traore RN  Outcome: Progressing  7/28/2024 1611 by Keara Traore RN  Outcome: Progressing     Problem: NEUROSENSORY - ADULT  Goal: Achieves maximal functionality and self care  Description: INTERVENTIONS  - Monitor swallowing and airway patency with patient fatigue and changes in neurological status  - Encourage and assist patient to increase activity and self care.   - Encourage visually impaired, hearing impaired and aphasic patients to use assistive/communication devices  7/28/2024 1612 by Keara Traore RN  Outcome: Progressing  7/28/2024 1611 by Keara Traore RN  Outcome: Progressing

## 2024-07-28 NOTE — PROGRESS NOTES
Cardiology Progress Note - Anamaria Parnell 79 y.o. female MRN: [id number]    Unit/Bed#: [place] [address] -01 Encounter: [id number]      Assessment & Plan:    Gangrene (HCC)  -Presented with right great toe gangrene/osteomyelitis  -Underwent bilateral femoral endarterectomy with left to right femoral-femoral bypass and retrograde LIMA stenting and bilateral VAC placement on 7/24  - Tentatively planning for right-sided femoral to below the knee popliteal artery bypass this Friday 8/2  - Patient will then need to amputation by podiatry afterwards    Paroxysmal atrial fibrillation  - EKGs around 11:30 PM on 7/26 showed new onset atrial fibrillation  - By the time she was placed on telemetry, she was back in sinus rhythm and has been in sinus rhythm since then  - Given her anemia, DAPT for recent surgery, recent surgery and low A-fib burden, will hold off starting anticoagulation for now    Coronary artery disease of native artery of native heart with stable angina pectoris (HCC)  -TTE 7/21/2024 showed EF 41%, severe LAE, moderate MR, mild TR, estimated PA pressure 59 mmHg  -Nuclear pharmacologic stress test 8/18/2023 showed no evidence of inducible ischemia or scar, preserved LVEF  - LHC 2/26/2020 showed 50 to 60% mid LAD, D2 small vessels with 90% stenosis, moderate atherosclerosis of the LCx,  of the proximal RCA with left-to-right collaterals  - Continue with aspirin 81 mg daily Brilinta 90 mg twice daily    Aortoiliac occlusive disease (HCC)  -CTA with runoff 7/18/2024 showed marked soft plaque in the abdominal aorta, bilateral multifocal severe stenosis within the external iliac and common femoral arteries, long segment of the SFA occlusion extending from the ostial to the level of the popliteal artery  - Underwent femoral endarterectomy, bypass and stenting with vascular surgery on 7/24  - Continue with aspirin 81 mg daily and Brilinta 90 mg twice daily    Primary hypertension  -Currently on amlodipine 5 mg daily,  "clonidine 0.3 mg patch weekly, and Toprol- mg daily    Hyperlipidemia  - Continue with pravastatin 80 mg daily    Basilar artery stenosis    CVA (cerebral vascular accident) (HCC)  - Status post left TCAR in September 2023    Stage 4 chronic kidney disease (HCC)    Depression, recurrent (HCC)    Type 2 diabetes mellitus, without long-term current use of insulin (HCC)    Cellulitis of toe of right foot     Summary:  -Patient noted to have short episode of paroxysmal atrial fibrillation Friday night 7/26  - No recurrent episodes of A-fib on telemetry, continue to monitor on telemetry while inpatient  - Hold off starting anticoagulation given her anemia, DAPT, recent surgery and low A-fib burden  - Can also consider starting amiodarone if she goes back to A-fib  - Otherwise stable from cardiac standpoint  - Vascular surgery planning for right femoropopliteal bypass next Friday 8/2    Cardiology will follow peripherally.  Will see again towards the end of this week preoperatively for repeat cardiac risk assessment/optimization.      Subjective:   No significant events overnight.  She reports feeling okay this morning and has no major complaints.  Denies any chest pain, shortness of breath, palpitations, nausea, vomiting, fever, chills, headache or dizziness.    Objective:     Vitals: Blood pressure 123/74, pulse 82, temperature (!) 97.1 °F (36.2 °C), temperature source Oral, resp. rate 17, height 4' 10\" (1.473 m), weight 70.4 kg (155 lb 3.3 oz), SpO2 93%., Body mass index is 32.44 kg/m².,   Orthostatic Blood Pressures      Flowsheet Row Most Recent Value   Blood Pressure 123/74 filed at 07/28/2024 0739   Patient Position - Orthostatic VS Lying filed at 07/28/2024 0739              Intake/Output Summary (Last 24 hours) at 7/28/2024 1100  Last data filed at 7/28/2024 0945  Gross per 24 hour   Intake 1180 ml   Output 450 ml   Net 730 ml           Physical Exam:    GEN: Anamaria Parnell appears well, alert and oriented " x 3, pleasant and cooperative   HEENT: Mucous membranes moist, no scleral icterus, no conjunctival pallor  NECK: + Trace JVD  HEART: Regular rate and rhythm, normal S1 and S2, no murmurs or rubs   LUNGS: Decreased breath sounds at bases bilaterally, otherwise clear to auscultation  ABDOMEN: normal bowel sounds, soft, no tenderness, no distention  EXTREMITIES: Trace bilateral lower extremity edema, bilateral wound vacs on upper legs  NEURO: no focal findings   SKIN: Gangrenous right great toe wrapped in gauze        Current Facility-Administered Medications:     acetaminophen (TYLENOL) tablet 975 mg, 975 mg, Oral, Q8H JAVIER, Harvey Rey DO, 975 mg at 07/28/24 0522    allopurinol (ZYLOPRIM) tablet 100 mg, 100 mg, Oral, Daily, Harvey Rey DO, 100 mg at 07/28/24 0840    aspirin chewable tablet 81 mg, 81 mg, Oral, Daily, Harvey Rey DO, 81 mg at 07/28/24 0840    chlorhexidine (PERIDEX) 0.12 % oral rinse 15 mL, 15 mL, Mouth/Throat, Q12H JAVIER, Milly Fatima PA-C, 15 mL at 07/25/24 0805    cloNIDine (CATAPRES-TTS-3) 0.3 mg/24 hr TD weekly patch, 1 patch, Transdermal, Weekly, Harvey Rey DO, 0.3 mg at 07/23/24 0825    escitalopram (LEXAPRO) tablet 20 mg, 20 mg, Oral, Daily, Harvey Rey DO, 20 mg at 07/28/24 0840    heparin (porcine) subcutaneous injection 5,000 Units, 5,000 Units, Subcutaneous, Q8H JAVIER, 5,000 Units at 07/28/24 0522 **AND** [CANCELED] Platelet count, , , Once, Harvey Rey DO    HYDROmorphone (DILAUDID) injection 0.5 mg, 0.5 mg, Intravenous, Q5 Min PRN, Mp Nielsen DO    HYDROmorphone HCl (DILAUDID) injection 0.2 mg, 0.2 mg, Intravenous, Q6H PRN, Harvey Rey DO    insulin lispro (HumALOG/ADMELOG) 100 units/mL subcutaneous injection 1-5 Units, 1-5 Units, Subcutaneous, TID AC, 1 Units at 07/28/24 0840 **AND** Fingerstick Glucose (POCT), , , TID AC, Harvey Rey DO    lidocaine (LIDODERM) 5 % patch 1 patch, 1 patch, Topical, Daily, Harvey Rey DO, 1 patch at 07/28/24 0840    LORazepam  "(ATIVAN) tablet 0.5 mg, 0.5 mg, Oral, Q8H PRN, Harvey Rey DO, 0.5 mg at 07/23/24 1307    metoprolol succinate (TOPROL-XL) 24 hr tablet 100 mg, 100 mg, Oral, Daily, Harvey Rey DO, 100 mg at 07/28/24 0840    ondansetron (ZOFRAN) injection 4 mg, 4 mg, Intravenous, Q6H PRN, Harvey Rey DO    oxyCODONE (ROXICODONE) IR tablet 5 mg, 5 mg, Oral, Q6H PRN, Harvey Rey DO, 5 mg at 07/28/24 0328    pravastatin (PRAVACHOL) tablet 80 mg, 80 mg, Oral, Daily With Dinner, Harvey Rey DO, 80 mg at 07/27/24 1754    sodium bicarbonate tablet 650 mg, 650 mg, Oral, BID after meals, Harvey Rey DO, 650 mg at 07/28/24 0840    ticagrelor (BRILINTA) tablet 90 mg, 90 mg, Oral, Q12H JAVIER, Harvey Rey DO, 90 mg at 07/28/24 0840    torsemide (DEMADEX) tablet 10 mg, 10 mg, Oral, Daily, Joselyn Reyes Bahamonde, MD, 10 mg at 07/28/24 0840    traZODone (DESYREL) tablet 50 mg, 50 mg, Oral, HS, Harvey Rey DO, 50 mg at 07/27/24 2204    Labs & Results:    No results found for: \"CKTOTAL\", \"CKMB\", \"CKMBINDEX\", \"TROPONINI\"    Lab Results   Component Value Date    GLUCOSE 179 (H) 07/24/2024    CALCIUM 8.2 (L) 07/28/2024    K 3.4 (L) 07/28/2024    CO2 24 07/28/2024     07/28/2024    BUN 41 (H) 07/28/2024    CREATININE 1.59 (H) 07/28/2024       Lab Results   Component Value Date    WBC 28.95 (H) 07/28/2024    HGB 7.9 (L) 07/28/2024    HCT 24.1 (L) 07/28/2024    MCV 96 07/28/2024     07/28/2024     Results from last 7 days   Lab Units 07/24/24  1504   INR  1.51*       No results found for: \"CHOL\"  Lab Results   Component Value Date    HDL 49 (L) 01/06/2024    HDL 52 09/03/2023     Lab Results   Component Value Date    LDLCALC 115 (H) 01/06/2024    LDLCALC 59 09/03/2023     Lab Results   Component Value Date    TRIG 161 (H) 01/06/2024    TRIG 164 (H) 09/03/2023       Lab Results   Component Value Date    ALT 3 (L) 07/24/2024    AST 15 07/24/2024    ALKPHOS 74 07/24/2024         EKG personally reviewed by )Dawson Almonte, " MD. No acute changes   Telemetry: No significant arrhythmias overnight

## 2024-07-28 NOTE — PROGRESS NOTES
"Progress Note - Vascular Surgery   Anamaria Parnell 79 y.o. female MRN: [id number]  Unit/Bed#: [place] [address] -01 Encounter: [id number]    Assessment:  79yoF w extensive PAD and aortic dz, R CLTI and R great toe dry gangrene, now s/p  b/l fem endarterectomy w L to R fem-fem PTFE bypass and retrograde L JACI, L EIA stenting, b/l groin VAC placement on 7/24     Vital stable, afebrile  WBC 28.95 from 24.68  Hemoglobin 7.9 from 8.2  Creatinine 1.59 from 1.71       cc serosanguineous    Plan:  -CCD2 diet as tolerated  -Bilateral groin VAC, monitor output and character  -Appreciate podiatry recommendations, local wound care for right great toe dry gangrene  -Planning on right-sided femoral to below-knee popliteal artery bypass tentatively scheduled for 8/2  -ASA/Brilinta/pravastatin  -Appreciate nephrology recommendations  -H  -Pain control  -Incentive spirometry  -Care per primary team    Subjective/Objective   Subjective:   Doing well, no acute events overnight, tolerating diet, pain well-controlled, voiding with [place] now removed.    Objective:     Blood pressure 123/74, pulse 82, temperature (!) 97.1 °F (36.2 °C), resp. rate 17, height 4' 10\" (1.473 m), weight 70.4 kg (155 lb 3.3 oz), SpO2 93%.,Body mass index is 32.44 kg/m².      Intake/Output Summary (Last 24 hours) at 7/28/2024 0753  Last data filed at 7/28/2024 0701  Gross per 24 hour   Intake 1320 ml   Output 600 ml   Net 720 ml       Invasive Devices       Peripheral Intravenous Line  Duration             Peripheral IV 07/25/24 Left Forearm 3 days    Peripheral IV 07/27/24 Right;Ventral (anterior) Forearm 1 day                    Physical Exam:   General: NAD  Skin: Warm, dry, anicteric, bilateral groin sites with VAC in place, serosanguineous drainage  HEENT: Normocephalic, atraumatic  CV: RRR, no m/r/g, RLE Doppler signals for DP/PT  Pulm: CTA b/l, no inc WOB  Abd: Soft, ND/NT  MSK: Symmetric, no edema, no tenderness, no deformity, right great toe " "dry gangrene w dressing present  Neuro: AOx3, GCS 15     Lab, Imaging and other studies:I have personally reviewed pertinent lab results.  , CBC:   Lab Results   Component Value Date    WBC 28.95 (H) 07/28/2024    HGB 7.9 (L) 07/28/2024    HCT 24.1 (L) 07/28/2024    MCV 96 07/28/2024     07/28/2024    RBC 2.50 (L) 07/28/2024    MCH 31.6 07/28/2024    MCHC 32.8 07/28/2024    RDW 15.2 (H) 07/28/2024    MPV 10.7 07/28/2024   , CMP:   Lab Results   Component Value Date    SODIUM 141 07/28/2024    K 3.4 (L) 07/28/2024     07/28/2024    CO2 24 07/28/2024    BUN 41 (H) 07/28/2024    CREATININE 1.59 (H) 07/28/2024    CALCIUM 8.2 (L) 07/28/2024    EGFR 31 07/28/2024   , Coagulation: No results found for: \"PT\", \"INR\", \"APTT\"  VTE Pharmacologic Prophylaxis: Heparin  VTE Mechanical Prophylaxis: sequential compression device  "

## 2024-07-28 NOTE — PLAN OF CARE
Problem: PAIN - ADULT  Goal: Verbalizes/displays adequate comfort level or baseline comfort level  Description: Interventions:  - Encourage patient to monitor pain and request assistance  - Assess pain using appropriate pain scale  - Administer analgesics based on type and severity of pain and evaluate response  - Implement non-pharmacological measures as appropriate and evaluate response  - Consider cultural and social influences on pain and pain management  - Notify physician/advanced practitioner if interventions unsuccessful or patient reports new pain  Outcome: Progressing     Problem: INFECTION - ADULT  Goal: Absence or prevention of progression during hospitalization  Description: INTERVENTIONS:  - Assess and monitor for signs and symptoms of infection  - Monitor lab/diagnostic results  - Monitor all insertion sites, i.e. indwelling lines, tubes, and drains  - Monitor endotracheal if appropriate and nasal secretions for changes in amount and color  - [place] appropriate cooling/warming therapies per order  - Administer medications as ordered  - Instruct and encourage patient and family to use good hand hygiene technique  - Identify and instruct in appropriate isolation precautions for identified infection/condition  Outcome: Progressing     Problem: SAFETY ADULT  Goal: Patient will remain free of falls  Description: INTERVENTIONS:  - Educate patient/family on patient safety including physical limitations  - Instruct patient to call for assistance with activity   - Consult OT/PT to assist with strengthening/mobility   - Keep Call bell within reach  - Keep bed low and locked with side rails adjusted as appropriate  - Keep care items and personal belongings within reach  - Initiate and maintain comfort rounds  - Make Fall Risk Sign visible to staff  - Offer Toileting every 2 Hours, in advance of need  - Initiate/Maintain bed alarm  - Apply yellow socks and bracelet for high fall risk patients  - Consider  moving patient to room near nurses station  Outcome: Progressing  Goal: Maintain or return to baseline ADL function  Description: INTERVENTIONS:  -  Assess patient's ability to carry out ADLs; assess patient's baseline for ADL function and identify physical deficits which impact ability to perform ADLs (bathing, care of mouth/teeth, toileting, grooming, dressing, etc.)  - Assess/evaluate cause of self-care deficits   - Assess range of motion  - Assess patient's mobility; develop plan if impaired  - Assess patient's need for assistive devices and provide as appropriate  - Encourage maximum independence but intervene and supervise when necessary  - Involve family in performance of ADLs  - Assess for home care needs following discharge   - Consider OT consult to assist with ADL evaluation and planning for discharge  - Provide patient education as appropriate  Outcome: Progressing  Goal: Maintains/Returns to pre admission functional level  Description: INTERVENTIONS:  - Perform AM-PAC 6 Click Basic Mobility/ Daily Activity assessment daily.  - Set and communicate daily mobility goal to care team and patient/family/caregiver.   - Collaborate with rehabilitation services on mobility goals if consulted  - Stand patient 3 times a day  - Ambulate patient 3 times a day  - Out of bed to chair 3 times a day   - Out of bed for meals 3 times a day  - Out of bed for toileting  - Record patient progress and toleration of activity level   Outcome: Progressing     Problem: DISCHARGE PLANNING  Goal: Discharge to home or other facility with appropriate resources  Description: INTERVENTIONS:  - Identify barriers to discharge w/patient and caregiver  - Arrange for needed discharge resources and transportation as appropriate  - Identify discharge learning needs (meds, wound care, etc.)  - Arrange for interpretive services to assist at discharge as needed  - Refer to Case Management Department for coordinating discharge planning if the  patient needs post-hospital services based on physician/advanced practitioner order or complex needs related to functional status, cognitive ability, or social support system  Outcome: Progressing     Problem: Knowledge Deficit  Goal: Patient/family/caregiver demonstrates understanding of disease process, treatment plan, medications, and discharge instructions  Description: Complete learning assessment and assess knowledge base.  Interventions:  - Provide teaching at level of understanding  - Provide teaching via preferred learning methods  Outcome: Progressing     Problem: CARDIOVASCULAR - ADULT  Goal: Maintains optimal cardiac output and hemodynamic stability  Description: INTERVENTIONS:  - Monitor I/O, vital signs and rhythm  - Monitor for S/S and trends of decreased cardiac output  - Administer and titrate ordered vasoactive medications to optimize hemodynamic stability  - Assess quality of pulses, skin color and temperature  - Assess for signs of decreased coronary artery perfusion  - Instruct patient to report change in severity of symptoms  Outcome: Progressing     Problem: METABOLIC, FLUID AND ELECTROLYTES - ADULT  Goal: Electrolytes maintained within normal limits  Description: INTERVENTIONS:  - Monitor labs and assess patient for signs and symptoms of electrolyte imbalances  - Administer electrolyte replacement as ordered  - Monitor response to electrolyte replacements, including repeat lab results as appropriate  - Instruct patient on fluid and nutrition as appropriate  Outcome: Progressing  Goal: Fluid balance maintained  Description: INTERVENTIONS:  - Monitor labs   - Monitor I/O and WT  - Instruct patient on fluid and nutrition as appropriate  - Assess for signs & symptoms of volume excess or deficit  Outcome: Progressing  Goal: Glucose maintained within target range  Description: INTERVENTIONS:  - Monitor Blood Glucose as ordered  - Assess for signs and symptoms of hyperglycemia and hypoglycemia  -  Administer ordered medications to maintain glucose within target range  - Assess nutritional intake and initiate nutrition service referral as needed  Outcome: Progressing     Problem: HEMATOLOGIC - ADULT  Goal: Maintains hematologic stability  Description: INTERVENTIONS  - Assess for signs and symptoms of bleeding or hemorrhage  - Monitor labs  - Administer supportive blood products/factors as ordered and appropriate  Outcome: Progressing     Problem: MUSCULOSKELETAL - ADULT  Goal: Maintain or return mobility to safest level of function  Description: INTERVENTIONS:  - Assess patient's ability to carry out ADLs; assess patient's baseline for ADL function and identify physical deficits which impact ability to perform ADLs (bathing, care of mouth/teeth, toileting, grooming, dressing, etc.)  - Assess/evaluate cause of self-care deficits   - Assess range of motion  - Assess patient's mobility  - Assess patient's need for assistive devices and provide as appropriate  - Encourage maximum independence but intervene and supervise when necessary  - Involve family in performance of ADLs  - Assess for home care needs following discharge   - Consider OT consult to assist with ADL evaluation and planning for discharge  - Provide patient education as appropriate  Outcome: Progressing  Goal: Maintain proper alignment of affected body part  Description: INTERVENTIONS:  - Support, maintain and protect limb and body alignment  - Provide patient/ family with appropriate education  Outcome: Progressing     Problem: Prexisting or High Potential for Compromised Skin Integrity  Goal: Skin integrity is maintained or improved  Description: INTERVENTIONS:  - Identify patients at risk for skin breakdown  - Assess and monitor skin integrity  - Assess and monitor nutrition and hydration status  - Monitor labs   - Assess for incontinence   - Turn and reposition patient  - Assist with mobility/ambulation  - Relieve pressure over bony  prominences  - Avoid friction and shearing  - Provide appropriate hygiene as needed including keeping skin clean and dry  - Evaluate need for skin moisturizer/barrier cream  - Collaborate with interdisciplinary team   - Patient/family teaching  - Consider wound care consult   Outcome: Progressing     Problem: Nutrition/Hydration-ADULT  Goal: Nutrient/Hydration intake appropriate for improving, restoring or maintaining nutritional needs  Description: Monitor and assess patient's nutrition/hydration status for malnutrition. Collaborate with interdisciplinary team and initiate plan and interventions as ordered.  Monitor patient's weight and dietary intake as ordered or per policy. Utilize nutrition screening tool and intervene as necessary. Determine patient's food preferences and provide high-protein, high-caloric foods as appropriate.     INTERVENTIONS:  - Monitor oral intake, urinary output, labs, and treatment plans  - Assess nutrition and hydration status and recommend course of action  - Evaluate amount of meals eaten  - Assist patient with eating if necessary   - Allow adequate time for meals  - Recommend/ encourage appropriate diets, oral nutritional supplements, and vitamin/mineral supplements  - Order, calculate, and assess calorie counts as needed  - Recommend, monitor, and adjust tube feedings and TPN/PPN based on assessed needs  - Assess need for intravenous fluids  - Provide specific nutrition/hydration education as appropriate  - Include patient/family/caregiver in decisions related to nutrition  Outcome: Progressing     Problem: NEUROSENSORY - ADULT  Goal: Achieves maximal functionality and self care  Description: INTERVENTIONS  - Monitor swallowing and airway patency with patient fatigue and changes in neurological status  - Encourage and assist patient to increase activity and self care.   - Encourage visually impaired, hearing impaired and aphasic patients to use assistive/communication  devices  Outcome: Progressing

## 2024-07-28 NOTE — PLAN OF CARE
Problem: PHYSICAL THERAPY ADULT  Goal: Performs mobility at highest level of function for planned discharge setting.  See evaluation for individualized goals.  Description: Treatment/Interventions: Functional transfer training, LE strengthening/ROM, Therapeutic exercise, Patient/family training, Equipment eval/education, Bed mobility, Cognitive reorientation, Gait training, Compensatory technique education, Continued evaluation, Spoke to nursing, OT          See flowsheet documentation for full assessment, interventions and recommendations.  Outcome: Progressing  Note: Prognosis: Fair  Problem List: Decreased strength, Decreased endurance, Impaired balance, Decreased mobility, Decreased skin integrity, Obesity, Decreased safety awareness  Assessment: Pt seen for PT treatment session this date with interventions consisting of transfer training, gait training, and HEP, and education provided as needed for safety and direction to improve functional mobility, safety awareness, and activity tolerance. Pt agreeable to PT treatment session upon arrival, pt found out of bed in recliner. At end of session, pt left out of bed in recliner with all needs in reach. In comparison to previous session, pt with improvement in activity tolerance, endurance, standing balance, ambulation distances, and ambulatory balance. Pt  was able to progress with ambulation distances to 4' x1 and 5' x2 with use of Rw and mod assist x2 progressing to mod assist x1. Pt  requires verbal cues for proper le sequencing,  poor carryover noted with proper le sequencing techniques and transfer technique. Pt  requires mod- min assist x2 to transfer from lower surfaces. Pt  performs sit<> stand transfers to and from higher surfaces with min assist x1, verbal cues for hand placement required .  Pt  performs seated b/l le arom exercises  x 10- 12 reps. To tolerance. Pt is requiring increased assistance for transfers and ambulation and is functioning well  below baseline level of mobility.   Continue to recommend  level II moderate rehab resource intensity  at time of d/c in order to maximize pt's functional independence and safety w/ mobility. Pt continues to be functioning below baseline level. PT will continue to see pt while here in order to address the deficits listed above and provide interventions consistent w/ POC in effort to achieve STGs.    The patient's AM-PAC Basic Mobility Inpatient Short Form Raw Score is 12. A raw score less than 16 suggests the patient may benefit from discharge to post-acute rehabilitation services. Please also refer to the recommendation of the Physical Therapist for safe discharge planning.  Barriers to Discharge: None, Decreased caregiver support (pt may need increased support post operatively)     Rehab Resource Intensity Level, PT: II (Moderate Resource Intensity)    See flowsheet documentation for full assessment.

## 2024-07-28 NOTE — PROGRESS NOTES
NEPHROLOGY PROGRESS NOTE   Anamaria Parnell 79 y.o. female MRN: [id number]  Unit/Bed#: [place] [address] -01 Encounter: [id number]      HPI/24hr EVENTS:    -79-year-old female past medical history of CKD 3/4, hypertension, DM2, history of CVA, CAD.  Presented with right great toe gangrene.  Nephrology consult for management of RICARDO on CKD     -Improved creatinine    ASSESSMENT/PLAN:    RICARDO on CKD 3/4  -Follows with Dr. NAYE Longoria as outpatient  -Baseline creatinine is 1.4-1.7  -Creatinine on admission 2.1, now improved to 1.59 back to baseline  -Acute etiology is likely due to to ATN from soft tissue infection/sepsis  -Renal imaging from 7/18/2024 with asymmetric left renal atrophy and relative underperfusion secondary to near renal artery occlusion, right renal cyst, bilateral ureters unremarkable.,  Prior renal imaging/kidney ultrasound from 9/23 with markedly atrophic left kidney  -Losartan is on hold, off IV fluids, on torsemide 10 mg daily  -Has Mclaughlin catheter     Hypertension  -Currently on Norvasc 5 mg daily, clonidine patch 0.3 mg, Toprol- mg daily, torsemide 10 mg daily  -Recent blood pressures are acceptable     Metabolic acidosis  -Currently on sodium bicarb to 650 mg twice daily  -Recent bicarb level is 24, monitor on current regimen     Anemia  -Recent hemoglobin is 7.9, management per primary team  -Has received 3 units PRBCs on admission     Right hallux gangrene/PAD  -Status post vascular surgery procedures on admission, postoperative management per vascular surgery     SUBJECTIVE:  No acute complaints, appetite is reduced denies any nausea/vomiting/diarrhea    ROS:  Review of Systems   Constitutional:  Positive for appetite change.   Respiratory: Negative.     Cardiovascular: Negative.    Gastrointestinal: Negative.    Genitourinary: Negative.    Neurological: Negative.       A complete 10 point review of systems was performed and found to be negative unless otherwise noted above or in the  HPI.    OBJECTIVE:  Current Weight: Weight - Scale: 70.4 kg (155 lb 3.3 oz)  Vitals:    07/28/24 0329 07/28/24 0535 07/28/24 0728 07/28/24 0739   BP: 140/74  119/61 123/74   BP Location:   Left arm Left arm   Pulse: 70  76 82   Resp: 22 20 17   Temp:   (!) 95 °F (35 °C) (!) 97.1 °F (36.2 °C)   TempSrc:   Oral Oral   SpO2: 97%  96% 93%   Weight:  70.4 kg (155 lb 3.3 oz)     Height:           Intake/Output Summary (Last 24 hours) at 7/28/2024 0850  Last data filed at 7/28/2024 0701  Gross per 24 hour   Intake 1060 ml   Output 600 ml   Net 460 ml     Physical Exam  Vitals and nursing note reviewed.   Constitutional:       General: She is not in acute distress.     Appearance: She is obese. She is not toxic-appearing or diaphoretic.      Comments: Awake sitting in chair, frail-appearing   HENT:      Head: Normocephalic and atraumatic.      Nose: Nose normal.      Mouth/Throat:      Mouth: Mucous membranes are dry.   Eyes:      General: No scleral icterus.  Cardiovascular:      Rate and Rhythm: Normal rate.      Pulses: Normal pulses.   Pulmonary:      Effort: Pulmonary effort is normal. No respiratory distress.      Breath sounds: No wheezing or rales.   Abdominal:      General: Abdomen is flat.   Musculoskeletal:      Cervical back: Neck supple.      Comments: Trace bilateral lower extremity edema right greater than left, right foot dressing with wound VAC   Skin:     General: Skin is warm and dry.      Capillary Refill: Capillary refill takes less than 2 seconds.   Neurological:      Mental Status: She is alert and oriented to person, place, and time.          Medications:    Current Facility-Administered Medications:     acetaminophen (TYLENOL) tablet 975 mg, 975 mg, Oral, Q8H [facility], Harvey Rey DO, 975 mg at 07/28/24 0522    allopurinol (ZYLOPRIM) tablet 100 mg, 100 mg, Oral, Daily, Harvey Rey DO, 100 mg at 07/28/24 0840    aspirin chewable tablet 81 mg, 81 mg, Oral, Daily, Harvey Rey DO, 81 mg at 07/28/24  0840    chlorhexidine (PERIDEX) 0.12 % oral rinse 15 mL, 15 mL, Mouth/Throat, Q12H [facility], Milly Fatima PA-C, 15 mL at 07/25/24 0805    cloNIDine (CATAPRES-TTS-3) 0.3 mg/24 hr TD weekly patch, 1 patch, Transdermal, Weekly, Harvey Rey DO, 0.3 mg at 07/23/24 0825    escitalopram (LEXAPRO) tablet 20 mg, 20 mg, Oral, Daily, Harvey Rey DO, 20 mg at 07/28/24 0840    heparin (porcine) subcutaneous injection 5,000 Units, 5,000 Units, Subcutaneous, Q8H JAVIER, 5,000 Units at 07/28/24 0522 **AND** [CANCELED] Platelet count, , , Once, Harvey Rey DO    HYDROmorphone (DILAUDID) injection 0.5 mg, 0.5 mg, Intravenous, Q5 Min PRN, Mp Nielsen DO    HYDROmorphone HCl (DILAUDID) injection 0.2 mg, 0.2 mg, Intravenous, Q6H PRN, Harvey Rey DO    insulin lispro (HumALOG/ADMELOG) 100 units/mL subcutaneous injection 1-5 Units, 1-5 Units, Subcutaneous, TID AC, 1 Units at 07/28/24 0840 **AND** Fingerstick Glucose (POCT), , , TID AC, Harvey Rey DO    lidocaine (LIDODERM) 5 % patch 1 patch, 1 patch, Topical, Daily, Harvey Rey DO, 1 patch at 07/28/24 0840    LORazepam (ATIVAN) tablet 0.5 mg, 0.5 mg, Oral, Q8H PRN, Harvey Rey DO, 0.5 mg at 07/23/24 1307    metoprolol succinate (TOPROL-XL) 24 hr tablet 100 mg, 100 mg, Oral, Daily, Harvey Rey DO, 100 mg at 07/28/24 0840    ondansetron (ZOFRAN) injection 4 mg, 4 mg, Intravenous, Q6H PRN, Harvey Rey DO    oxyCODONE (ROXICODONE) IR tablet 5 mg, 5 mg, Oral, Q6H PRN, Harvey Rey DO, 5 mg at 07/28/24 0328    pravastatin (PRAVACHOL) tablet 80 mg, 80 mg, Oral, Daily With Dinner, Harvey Rey DO, 80 mg at 07/27/24 1754    sodium bicarbonate tablet 650 mg, 650 mg, Oral, BID after meals, Harvey Rey DO, 650 mg at 07/28/24 0840    ticagrelor (BRILINTA) tablet 90 mg, 90 mg, Oral, Q12H JAVIER, Harvey Rey DO, 90 mg at 07/28/24 0840    torsemide (DEMADEX) tablet 10 mg, 10 mg, Oral, Daily, Joselyn Reyes Bahamonde, MD, 10 mg at 07/28/24 0840    traZODone (DESYREL) tablet 50 mg,  50 mg, Oral, HS, Harvey Rey DO, 50 mg at 07/27/24 2200    Laboratory Results:  Results from last 7 days   Lab Units 07/28/24  0529 07/27/24  0501 07/26/24  0437 07/25/24  0436 07/24/24  1939 07/24/24  1551 07/24/24  1341 07/24/24  1037 07/24/24  0948 07/24/24  0901 07/24/24  0901 07/24/24  0456 07/23/24  0444   WBC Thousand/uL 28.95* 24.68* 24.31* 25.83* 32.36*  --   --   --   --   --   --  13.51* 12.47*   HEMOGLOBIN g/dL 7.9* 8.2* 8.3* 9.4* 11.0*  --   --   --   --   --   --  10.2* 9.9*   I STAT HEMOGLOBIN g/dl  --   --   --   --   --  7.8* 8.2* 7.8* 7.5*   < > 7.1*  --   --    HEMATOCRIT % 24.1* 24.6* 24.7* 28.1* 33.1*  --   --   --   --   --   --  31.3* 30.3*   HEMATOCRIT, ISTAT %  --   --   --   --   --  23* 24* 23* 22*   < > 21*  --   --    PLATELETS Thousands/uL 360 307 324 310 293  --   --   --   --   --   --  357 315   POTASSIUM mmol/L 3.4* 4.1 3.8 4.5 5.3  --   --   --   --   --   --  3.6 4.1   CHLORIDE mmol/L 107 106 106 112* 114*  --   --   --   --   --   --  104 106   CO2 mmol/L 24 25 23 18* 17*  --   --   --   --   --   --  23 24   CO2, I-STAT mmol/L  --   --   --   --   --  18* 20* 20* 21   < > 23  --   --    BUN mg/dL 41* 38* 39* 30* 25  --   --   --   --   --   --  24 22   CREATININE mg/dL 1.59* 1.71* 1.92* 1.59* 1.41*  --   --   --   --   --   --  1.46* 1.43*   CALCIUM mg/dL 8.2* 8.4 8.2* 8.2* 8.8  --   --   --   --   --   --  8.8 8.4   MAGNESIUM mg/dL 2.2 2.2  --   --  1.6*  --   --   --   --   --   --   --   --    PHOSPHORUS mg/dL  --   --   --   --  6.4*  --   --   --   --   --   --   --   --    GLUCOSE, ISTAT mg/dl  --   --   --   --   --  179* 186* 183* 163*  --  138  --   --     < > = values in this interval not displayed.       I have personally reviewed the blood work as stated above and in my note.  I have personally reviewed vascular surgery, podiatry, internal medicine note.

## 2024-07-28 NOTE — ASSESSMENT & PLAN NOTE
79-year-old female with history of PAD, CAD, diabetes, and hypertension who presented from the [facility] due to right great toe gangrene.  She was transferred here for vascular bypass. XR right foot showing cortical destruction along the ventral aspect of the first distal phalanx worrisome for acute osteomyelitis.     S/p Vascular surgery intervention 7/24/2024  Continue Aspirin Brillinta pravastatin  Patient has received a 10 day course in the ICU of cefazolin.  Infectious disease recommends monitoring off antibiotics for now  For amputation by podiatry after revascularization has been completed by vascular surgery temporarily planned for (Friday 8/2/2024)

## 2024-07-28 NOTE — ASSESSMENT & PLAN NOTE
"Converted spontaneously before she was placed on telemetry. ECG reviewed with cardiology.  Per cardiology: \"Given her anemia, DAPT for recent surgery, recent surgery and low A-fib burden, will hold off starting anticoagulation for now.\"   "

## 2024-07-28 NOTE — ASSESSMENT & PLAN NOTE
Baseline 1.4-1.7  RICARDO likely due to sepsis / pre-renal.  Management per nephrology. Torsemide restarted by nephrology due to volume overload.   Nephrology might have to be involved again prior to her planned intervention this Friday.    Recent Labs     07/26/24  0437 07/27/24  0501 07/28/24  0529   BUN 39* 38* 41*   CREATININE 1.92* 1.71* 1.59*   EGFR 24 28 31

## 2024-07-29 ENCOUNTER — APPOINTMENT (INPATIENT)
Dept: RADIOLOGY | Facility: HOSPITAL | Age: 79
DRG: 278 | End: 2024-07-29
Payer: COMMERCIAL

## 2024-07-29 ENCOUNTER — ANESTHESIA EVENT (INPATIENT)
Dept: PERIOP | Facility: HOSPITAL | Age: 79
DRG: 278 | End: 2024-07-29
Payer: COMMERCIAL

## 2024-07-29 ENCOUNTER — ANESTHESIA (INPATIENT)
Dept: PERIOP | Facility: HOSPITAL | Age: 79
DRG: 278 | End: 2024-07-29
Payer: COMMERCIAL

## 2024-07-29 ENCOUNTER — APPOINTMENT (INPATIENT)
Dept: NON INVASIVE DIAGNOSTICS | Facility: HOSPITAL | Age: 79
DRG: 278 | End: 2024-07-29
Payer: COMMERCIAL

## 2024-07-29 PROBLEM — I50.22 CHRONIC HFREF (HEART FAILURE WITH REDUCED EJECTION FRACTION) (HCC): Status: ACTIVE | Noted: 2024-07-29

## 2024-07-29 PROBLEM — I27.20 PULMONARY HYPERTENSION (HCC): Status: ACTIVE | Noted: 2024-07-29

## 2024-07-29 PROBLEM — R74.01 TRANSAMINITIS: Status: ACTIVE | Noted: 2024-07-29

## 2024-07-29 LAB
2HR DELTA HS TROPONIN: -210 NG/L
4HR DELTA HS TROPONIN: -216 NG/L
ABO GROUP BLD: NORMAL
ALBUMIN SERPL BCG-MCNC: 3 G/DL (ref 3.5–5)
ALP SERPL-CCNC: 103 U/L (ref 34–104)
ALT SERPL W P-5'-P-CCNC: 78 U/L (ref 7–52)
ANION GAP SERPL CALCULATED.3IONS-SCNC: 9 MMOL/L (ref 4–13)
ANISOCYTOSIS BLD QL SMEAR: PRESENT
AST SERPL W P-5'-P-CCNC: 229 U/L (ref 13–39)
ATRIAL RATE: 39 BPM
ATRIAL RATE: 92 BPM
BASOPHILS # BLD MANUAL: 0 THOUSAND/UL (ref 0–0.1)
BASOPHILS NFR MAR MANUAL: 0 % (ref 0–1)
BILIRUB SERPL-MCNC: 0.63 MG/DL (ref 0.2–1)
BLD GP AB SCN SERPL QL: NEGATIVE
BNP SERPL-MCNC: 3288 PG/ML (ref 0–100)
BUN SERPL-MCNC: 47 MG/DL (ref 5–25)
CALCIUM ALBUM COR SERPL-MCNC: 9.1 MG/DL (ref 8.3–10.1)
CALCIUM SERPL-MCNC: 8.3 MG/DL (ref 8.4–10.2)
CARDIAC TROPONIN I PNL SERPL HS: 1079 NG/L
CARDIAC TROPONIN I PNL SERPL HS: 863 NG/L
CARDIAC TROPONIN I PNL SERPL HS: 869 NG/L
CHLORIDE SERPL-SCNC: 108 MMOL/L (ref 96–108)
CO2 SERPL-SCNC: 25 MMOL/L (ref 21–32)
CREAT SERPL-MCNC: 1.55 MG/DL (ref 0.6–1.3)
EOSINOPHIL # BLD MANUAL: 0 THOUSAND/UL (ref 0–0.4)
EOSINOPHIL NFR BLD MANUAL: 0 % (ref 0–6)
ERYTHROCYTE [DISTWIDTH] IN BLOOD BY AUTOMATED COUNT: 16.2 % (ref 11.6–15.1)
GFR SERPL CREATININE-BSD FRML MDRD: 32 ML/MIN/1.73SQ M
GLUCOSE SERPL-MCNC: 147 MG/DL (ref 65–140)
GLUCOSE SERPL-MCNC: 148 MG/DL (ref 65–140)
GLUCOSE SERPL-MCNC: 190 MG/DL (ref 65–140)
GLUCOSE SERPL-MCNC: 191 MG/DL (ref 65–140)
GLUCOSE SERPL-MCNC: 206 MG/DL (ref 65–140)
GLUCOSE SERPL-MCNC: 216 MG/DL (ref 65–140)
HCT VFR BLD AUTO: 23.4 % (ref 34.8–46.1)
HGB BLD-MCNC: 7.7 G/DL (ref 11.5–15.4)
LYMPHOCYTES # BLD AUTO: 2.94 THOUSAND/UL (ref 0.6–4.47)
LYMPHOCYTES # BLD AUTO: 8 % (ref 14–44)
MACROCYTES BLD QL AUTO: PRESENT
MAGNESIUM SERPL-MCNC: 2 MG/DL (ref 1.9–2.7)
MCH RBC QN AUTO: 31.2 PG (ref 26.8–34.3)
MCHC RBC AUTO-ENTMCNC: 32.9 G/DL (ref 31.4–37.4)
MCV RBC AUTO: 95 FL (ref 82–98)
METAMYELOCYTE ABSOLUTE CT: 1.1 THOUSAND/UL (ref 0–0.1)
METAMYELOCYTES NFR BLD MANUAL: 3 % (ref 0–1)
MONOCYTES # BLD AUTO: 2.58 THOUSAND/UL (ref 0–1.22)
MONOCYTES NFR BLD: 7 % (ref 4–12)
MYELOCYTE ABSOLUTE CT: 0.74 THOUSAND/UL (ref 0–0.1)
MYELOCYTES NFR BLD MANUAL: 2 % (ref 0–1)
NEUTROPHILS # BLD MANUAL: 29.45 THOUSAND/UL (ref 1.85–7.62)
NEUTS BAND NFR BLD MANUAL: 3 % (ref 0–8)
NEUTS SEG NFR BLD AUTO: 77 % (ref 43–75)
NRBC BLD AUTO-RTO: 4 /100 WBC (ref 0–2)
OVALOCYTES BLD QL SMEAR: PRESENT
P AXIS: 95 DEGREES
PLATELET # BLD AUTO: 422 THOUSANDS/UL (ref 149–390)
PLATELET BLD QL SMEAR: ABNORMAL
PMV BLD AUTO: 10.4 FL (ref 8.9–12.7)
POLYCHROMASIA BLD QL SMEAR: PRESENT
POTASSIUM SERPL-SCNC: 4 MMOL/L (ref 3.5–5.3)
PR INTERVAL: 170 MS
PR INTERVAL: 176 MS
PROT SERPL-MCNC: 5.7 G/DL (ref 6.4–8.4)
QRS AXIS: -24 DEGREES
QRS AXIS: -37 DEGREES
QRSD INTERVAL: 162 MS
QRSD INTERVAL: 168 MS
QT INTERVAL: 452 MS
QT INTERVAL: 496 MS
QTC INTERVAL: 399 MS
QTC INTERVAL: 558 MS
RBC # BLD AUTO: 2.47 MILLION/UL (ref 3.81–5.12)
RBC MORPH BLD: PRESENT
RH BLD: NEGATIVE
SODIUM SERPL-SCNC: 142 MMOL/L (ref 135–147)
SPECIMEN EXPIRATION DATE: NORMAL
T WAVE AXIS: 149 DEGREES
T WAVE AXIS: 165 DEGREES
VENTRICULAR RATE: 39 BPM
VENTRICULAR RATE: 92 BPM
WBC # BLD AUTO: 36.81 THOUSAND/UL (ref 4.31–10.16)

## 2024-07-29 PROCEDURE — 87077 CULTURE AEROBIC IDENTIFY: CPT | Performed by: PODIATRIST

## 2024-07-29 PROCEDURE — 93925 LOWER EXTREMITY STUDY: CPT

## 2024-07-29 PROCEDURE — 86923 COMPATIBILITY TEST ELECTRIC: CPT

## 2024-07-29 PROCEDURE — 93010 ELECTROCARDIOGRAM REPORT: CPT | Performed by: STUDENT IN AN ORGANIZED HEALTH CARE EDUCATION/TRAINING PROGRAM

## 2024-07-29 PROCEDURE — 82948 REAGENT STRIP/BLOOD GLUCOSE: CPT

## 2024-07-29 PROCEDURE — 87075 CULTR BACTERIA EXCEPT BLOOD: CPT | Performed by: PODIATRIST

## 2024-07-29 PROCEDURE — 97605 NEG PRS WND THER DME<=50SQCM: CPT | Performed by: NURSE PRACTITIONER

## 2024-07-29 PROCEDURE — 83880 ASSAY OF NATRIURETIC PEPTIDE: CPT | Performed by: PHYSICIAN ASSISTANT

## 2024-07-29 PROCEDURE — 83735 ASSAY OF MAGNESIUM: CPT | Performed by: PHYSICIAN ASSISTANT

## 2024-07-29 PROCEDURE — 88311 DECALCIFY TISSUE: CPT | Performed by: STUDENT IN AN ORGANIZED HEALTH CARE EDUCATION/TRAINING PROGRAM

## 2024-07-29 PROCEDURE — 86901 BLOOD TYPING SEROLOGIC RH(D): CPT

## 2024-07-29 PROCEDURE — 87186 SC STD MICRODIL/AGAR DIL: CPT | Performed by: PODIATRIST

## 2024-07-29 PROCEDURE — 99233 SBSQ HOSP IP/OBS HIGH 50: CPT | Performed by: PODIATRIST

## 2024-07-29 PROCEDURE — 88305 TISSUE EXAM BY PATHOLOGIST: CPT | Performed by: STUDENT IN AN ORGANIZED HEALTH CARE EDUCATION/TRAINING PROGRAM

## 2024-07-29 PROCEDURE — 99232 SBSQ HOSP IP/OBS MODERATE 35: CPT | Performed by: STUDENT IN AN ORGANIZED HEALTH CARE EDUCATION/TRAINING PROGRAM

## 2024-07-29 PROCEDURE — 71045 X-RAY EXAM CHEST 1 VIEW: CPT

## 2024-07-29 PROCEDURE — 28810 AMPUTATION TOE & METATARSAL: CPT | Performed by: PODIATRIST

## 2024-07-29 PROCEDURE — 87205 SMEAR GRAM STAIN: CPT | Performed by: PODIATRIST

## 2024-07-29 PROCEDURE — 73630 X-RAY EXAM OF FOOT: CPT

## 2024-07-29 PROCEDURE — 93005 ELECTROCARDIOGRAM TRACING: CPT

## 2024-07-29 PROCEDURE — 99232 SBSQ HOSP IP/OBS MODERATE 35: CPT | Performed by: INTERNAL MEDICINE

## 2024-07-29 PROCEDURE — 99024 POSTOP FOLLOW-UP VISIT: CPT | Performed by: SURGERY

## 2024-07-29 PROCEDURE — 85007 BL SMEAR W/DIFF WBC COUNT: CPT | Performed by: PHYSICIAN ASSISTANT

## 2024-07-29 PROCEDURE — 87070 CULTURE OTHR SPECIMN AEROBIC: CPT | Performed by: PODIATRIST

## 2024-07-29 PROCEDURE — 86900 BLOOD TYPING SEROLOGIC ABO: CPT

## 2024-07-29 PROCEDURE — 80053 COMPREHEN METABOLIC PANEL: CPT | Performed by: PHYSICIAN ASSISTANT

## 2024-07-29 PROCEDURE — 93922 UPR/L XTREMITY ART 2 LEVELS: CPT | Performed by: SURGERY

## 2024-07-29 PROCEDURE — 86850 RBC ANTIBODY SCREEN: CPT

## 2024-07-29 PROCEDURE — 85027 COMPLETE CBC AUTOMATED: CPT | Performed by: PHYSICIAN ASSISTANT

## 2024-07-29 PROCEDURE — 93925 LOWER EXTREMITY STUDY: CPT | Performed by: SURGERY

## 2024-07-29 PROCEDURE — 99222 1ST HOSP IP/OBS MODERATE 55: CPT | Performed by: INTERNAL MEDICINE

## 2024-07-29 PROCEDURE — 93923 UPR/LXTR ART STDY 3+ LVLS: CPT

## 2024-07-29 PROCEDURE — 0Y6P0Z0 DETACHMENT AT RIGHT 1ST TOE, COMPLETE, OPEN APPROACH: ICD-10-PCS

## 2024-07-29 PROCEDURE — 84484 ASSAY OF TROPONIN QUANT: CPT | Performed by: PHYSICIAN ASSISTANT

## 2024-07-29 RX ORDER — CEFAZOLIN SODIUM 1 G/50ML
1000 SOLUTION INTRAVENOUS EVERY 12 HOURS
Status: DISCONTINUED | OUTPATIENT
Start: 2024-07-29 | End: 2024-07-30

## 2024-07-29 RX ORDER — ONDANSETRON 2 MG/ML
INJECTION INTRAMUSCULAR; INTRAVENOUS AS NEEDED
Status: DISCONTINUED | OUTPATIENT
Start: 2024-07-29 | End: 2024-07-29

## 2024-07-29 RX ORDER — FENTANYL CITRATE 50 UG/ML
INJECTION, SOLUTION INTRAMUSCULAR; INTRAVENOUS AS NEEDED
Status: DISCONTINUED | OUTPATIENT
Start: 2024-07-29 | End: 2024-07-29

## 2024-07-29 RX ORDER — METRONIDAZOLE 500 MG/100ML
500 INJECTION, SOLUTION INTRAVENOUS EVERY 8 HOURS
Status: DISCONTINUED | OUTPATIENT
Start: 2024-07-29 | End: 2024-07-30

## 2024-07-29 RX ORDER — PHENYLEPHRINE HCL IN 0.9% NACL 1 MG/10 ML
SYRINGE (ML) INTRAVENOUS AS NEEDED
Status: DISCONTINUED | OUTPATIENT
Start: 2024-07-29 | End: 2024-07-29

## 2024-07-29 RX ORDER — SODIUM CHLORIDE, SODIUM LACTATE, POTASSIUM CHLORIDE, CALCIUM CHLORIDE 600; 310; 30; 20 MG/100ML; MG/100ML; MG/100ML; MG/100ML
INJECTION, SOLUTION INTRAVENOUS CONTINUOUS PRN
Status: DISCONTINUED | OUTPATIENT
Start: 2024-07-29 | End: 2024-07-29

## 2024-07-29 RX ORDER — PROPOFOL 10 MG/ML
INJECTION, EMULSION INTRAVENOUS CONTINUOUS PRN
Status: DISCONTINUED | OUTPATIENT
Start: 2024-07-29 | End: 2024-07-29

## 2024-07-29 RX ORDER — HYDROMORPHONE HCL IN WATER/PF 6 MG/30 ML
0.2 PATIENT CONTROLLED ANALGESIA SYRINGE INTRAVENOUS ONCE
Status: COMPLETED | OUTPATIENT
Start: 2024-07-29 | End: 2024-07-29

## 2024-07-29 RX ORDER — PROPOFOL 10 MG/ML
INJECTION, EMULSION INTRAVENOUS AS NEEDED
Status: DISCONTINUED | OUTPATIENT
Start: 2024-07-29 | End: 2024-07-29

## 2024-07-29 RX ORDER — ONDANSETRON 2 MG/ML
4 INJECTION INTRAMUSCULAR; INTRAVENOUS ONCE
Status: COMPLETED | OUTPATIENT
Start: 2024-07-29 | End: 2024-07-29

## 2024-07-29 RX ADMIN — SODIUM BICARBONATE 650 MG TABLET 650 MG: at 15:33

## 2024-07-29 RX ADMIN — TICAGRELOR 90 MG: 90 TABLET ORAL at 21:06

## 2024-07-29 RX ADMIN — CEFAZOLIN SODIUM 1000 MG: 1 SOLUTION INTRAVENOUS at 13:43

## 2024-07-29 RX ADMIN — HYDROMORPHONE HYDROCHLORIDE 0.2 MG: 0.2 INJECTION, SOLUTION INTRAMUSCULAR; INTRAVENOUS; SUBCUTANEOUS at 02:40

## 2024-07-29 RX ADMIN — TICAGRELOR 90 MG: 90 TABLET ORAL at 07:53

## 2024-07-29 RX ADMIN — TORSEMIDE 10 MG: 10 TABLET ORAL at 07:53

## 2024-07-29 RX ADMIN — ALLOPURINOL 100 MG: 100 TABLET ORAL at 07:53

## 2024-07-29 RX ADMIN — SODIUM CHLORIDE, SODIUM LACTATE, POTASSIUM CHLORIDE, AND CALCIUM CHLORIDE: .6; .31; .03; .02 INJECTION, SOLUTION INTRAVENOUS at 17:40

## 2024-07-29 RX ADMIN — HEPARIN SODIUM 5000 UNITS: 5000 INJECTION INTRAVENOUS; SUBCUTANEOUS at 06:12

## 2024-07-29 RX ADMIN — OXYCODONE HYDROCHLORIDE 5 MG: 5 TABLET ORAL at 19:54

## 2024-07-29 RX ADMIN — PROPOFOL 20 MG: 10 INJECTION, EMULSION INTRAVENOUS at 17:49

## 2024-07-29 RX ADMIN — FENTANYL CITRATE 25 MCG: 50 INJECTION INTRAMUSCULAR; INTRAVENOUS at 18:06

## 2024-07-29 RX ADMIN — ESCITALOPRAM OXALATE 20 MG: 20 TABLET ORAL at 07:52

## 2024-07-29 RX ADMIN — PRAVASTATIN SODIUM 80 MG: 80 TABLET ORAL at 15:33

## 2024-07-29 RX ADMIN — ONDANSETRON 4 MG: 2 INJECTION INTRAMUSCULAR; INTRAVENOUS at 04:25

## 2024-07-29 RX ADMIN — GLUCAGON 5 MG: KIT at 05:14

## 2024-07-29 RX ADMIN — SODIUM BICARBONATE 650 MG TABLET 650 MG: at 07:53

## 2024-07-29 RX ADMIN — FENTANYL CITRATE 25 MCG: 50 INJECTION INTRAMUSCULAR; INTRAVENOUS at 18:00

## 2024-07-29 RX ADMIN — FENTANYL CITRATE 50 MCG: 50 INJECTION INTRAMUSCULAR; INTRAVENOUS at 17:49

## 2024-07-29 RX ADMIN — INSULIN LISPRO 2 UNITS: 100 INJECTION, SOLUTION INTRAVENOUS; SUBCUTANEOUS at 07:52

## 2024-07-29 RX ADMIN — ONDANSETRON 4 MG: 2 INJECTION INTRAMUSCULAR; INTRAVENOUS at 18:03

## 2024-07-29 RX ADMIN — HEPARIN SODIUM 5000 UNITS: 5000 INJECTION INTRAVENOUS; SUBCUTANEOUS at 21:06

## 2024-07-29 RX ADMIN — OXYCODONE HYDROCHLORIDE 5 MG: 5 TABLET ORAL at 01:17

## 2024-07-29 RX ADMIN — METRONIDAZOLE 500 MG: 500 INJECTION, SOLUTION INTRAVENOUS at 13:43

## 2024-07-29 RX ADMIN — METRONIDAZOLE 500 MG: 500 INJECTION, SOLUTION INTRAVENOUS at 21:07

## 2024-07-29 RX ADMIN — PROPOFOL 50 MCG/KG/MIN: 10 INJECTION, EMULSION INTRAVENOUS at 17:49

## 2024-07-29 RX ADMIN — Medication 100 MCG: at 18:05

## 2024-07-29 RX ADMIN — ASPIRIN 81 MG CHEWABLE TABLET 81 MG: 81 TABLET CHEWABLE at 07:53

## 2024-07-29 NOTE — QUICK NOTE
Notified of pt feeling 'off.'  Telemetry review demonstrating intermittent bradycardia down to 40s-50s occasionally 30s and sinus and then converts back to 80s-90s sinus w/chronic LBBB.  Pt on toprol xl 100mg.  After d/w pt has intermittent sob/chest tightness which appears frequently consistent with her bradycardia episodes.  She also notes new onset fatigue with these episodes new from day prior.  BP preserved. Bg wnl.    Stat labs were obtained w/cxr as well as EKG to former evaluate the bradycardia.  After review w/cardiology pt appears to be going in and out of 2:1 av block.  Troponin is up at 1000 but has medically managed CAD w/last cath in 2020.  May have slight degree of volume overload by bnp of 3000 but no significant rales on exam just poor excursion w/peripheral edema.  O2 sat  95% on RA.  Does get clammy w/episodes of chest tightness/dyspnea.    ___________________  Mid LAD 50-60% stenosis  D2 small vessel with proximal 90% stenosis  Moderate atherosclerosis of LCX  Proximal RCA chronic total occlusion with left to right and right to right collaterals    Neg stress test in 08/2023.  Echo 7/21 w/new reduced EF 41% w/new RWMA hypokinesis in basal inferoseptal, mid inferoseptal, apical septal, apical lateral, apical inferior and apex.      D/w cardiologist who recommends reversal of beta blockade w/glucagon.  Will cycle vitals q 15 and we will preload w/zofran after further d/w cardiology on call to mitigate associated n/v.  Cardiology to d/w interventional cardiologist for temp pacing wire given gangrene vs isoproteronol in ICU vs other given recent antibiosis as white blood cells are now up to 30s and risk with associated device infection.  Likely will need eventual PPM at later date.    ____________    As of this time no immediate plans for temp pacer wire given surgical risk per d/w interventionalist.  HR improved to 80s for approx last hour.  D/w cardiology who will d/w EP later in day.  Defer  heparinization for now given anemia per d/w cardiology. Upgraded to level 2 sd for further cardiac monitoring.  D/w critical care.

## 2024-07-29 NOTE — OP NOTE
OPERATIVE REPORT - Podiatry  PATIENT NAME: Anamaria Parnell    :  1945  MRN: [id number]  Pt Location: [facility] OR ROOM 02    SURGERY DATE: 2024    Surgeons and Role:     * Eddie Farooq DPM - Primary     * Costa Omer DPM - Assisting    Pre-op Diagnosis:  Gangrene of toe of right foot (HCC) [I96]    Post-Op Diagnosis Codes:     * Gangrene of toe of right foot (HCC) [I96]    Procedure(s) (LRB):  Rigth Hallux amputation, partial 1st ray resection (Right)    Specimen(s):  ID Type Source Tests Collected by Time Destination   1 : Right Great Toe Tissue Toe, Right TISSUE EXAM Eddie Farooq DPM 2024 1800    A : Right Great Toe Tissue Toe, Right ANAEROBIC CULTURE AND GRAM STAIN, CULTURE, TISSUE AND GRAM STAIN Eddie Farooq DPM 2024 1805        Estimated Blood Loss:   Minimal    Drains:  * No LDAs found *    Anesthesia Type:   Choice with 20 ml of 0.5% Bupivacaine and 1% Lidocaine in a 1:1 mixture    Hemostasis:  - Atraumatic technique   - Manual compression   - Electrocautery     Materials:  * No implants in log *    Operative Findings:  - Liquefactive necrosis of soft tissue surrounding 1st MTPJ, with approximately 5cc of purulent drainage. 1st metatarsal head grey in appearance and soft on blunt instrument palpation. Osteotomy through metatarsal neck via sagittal saw. Packed open with Iodoform packing, DSD. Source control of gangrene infection. Minimal bleeding encountered intra-op.     Complications:   None    Procedure and Technique:     Under mild sedation, the patient was brought into the operating room and remained on their stretcher  in the supine position. IV sedation was achieved by anesthesia team and a universal timeout was performed where all parties are in agreement of correct patient, correct procedure and correct site. A local block was performed consisting of 20 ml of 0.5% Bupivacaine and 1% Lidocaine in a 1:1 mixture. The foot was then prepped and draped in the usual  "aseptic manner.     Attention was then directed to the right hallux, using a 15-blade a racquet shaped incision was made through skin directly to underlying bone. Approximately 5cc of purulent malodorous drainage was expressed upon incision. Hallux was then disarticulated at the MTPJ level and passed off of the surgical field for pathology. Soft tissue surrounding the 1st metatarsal head with liquefactive necrosis, and non-viable tissue which was resected via Ronge[place]. 1st metatarsal head grey in appearance and soft on instrument palpation, decision made to perform osteotomy through metatarsal neck via sagittal saw. Metatarsal head sent for pathology with hallux. Remaining metatarsal hard, white, and intact. Any tendinous structures were resected at their proximal aspect. Hemostasis via electrocautery, however minimal bleeding observed. Aerobic/anaerobic wound cultures obtained. Surgical site was then thoroughly irrigated via pulsed lavage with NSS. No further purulent drainage, sinus tracts, or necrotic tissue encountered. Wound was then packed open with Iodoform packing, and dressed with 4x4 gauze, ABD, and Kerlix.     The patient tolerated the procedure and anesthesia well without immediate complications and transferred to PACU with vital signs stable.     As with many limb salvage procedures, we contemplate the possibility of performing further stages to this procedure. Procedures may include debridements, delayed closure, plastic surgery techniques, or more proximal amputations. This procedure may be considered part of a multi-staged limb salvage treatment plan.     Dr. Farooq was present during the entire procedure and participated in all key aspects.    SIGNATURE: Costa Omer DPM  DATE: July 29, 2024  TIME: 6:21 PM      Portions of the record may have been created with voice recognition software. Occasional wrong word or \"sound a like\" substitutions may have occurred due to the inherent limitations of " voice recognition software. Read the chart carefully and recognize, using context, where substitutions have occurred.

## 2024-07-29 NOTE — PROGRESS NOTES
"Progress Note - Vascular Surgery   Anamaria Parnell 79 y.o. female MRN: [id number]  Unit/Bed#: [place] [address] -01 Encounter: [id number]    Assessment:  79yoF w extensive PAD and aortic dz, R CLTI and R great toe dry gangrene, now s/p  b/l fem endarterectomy w L to R fem-fem PTFE bypass and retrograde L JACI, L EIA stenting, b/l groin VAC placement on 7/24      Vital stable, afebrile  WBC 36.8 from 28.95   Hemoglobin 7.7 from 7.9   Creatinine 1.55  Troponin 869 from 1079       VAC 25 cc serosanguineous    Plan:  -rising leukocytosis, ID on board, likely needs re initiation of abx but will defer to ID who was managing abx plan   -down trending troponins and also elevated BNP, cardiology on board and appreciate recs  -CCD2 diet as tolerated  -Bilateral groin VAC, monitor output and character, changes MWF  -Appreciate podiatry recommendations, local wound care for right great toe dry gangrene, would inquire about more expedited amputation of great toe given rising leukocytosis  -Planning on right-sided femoral to below-knee popliteal artery bypass tentatively scheduled for 8/2  -ASA/Brilinta/pravastatin  -Appreciate nephrology recommendations  -[facility]  -Pain control  -Incentive spirometry  -Care per primary team    Subjective/Objective   Subjective:   Sitting in chair this morning doing well, tolerating diet, no significant pain, voiding.     Objective:     Blood pressure 132/63, pulse 82, temperature 97.5 °F (36.4 °C), resp. rate 22, height 4' 10\" (1.473 m), weight 70.4 kg (155 lb 3.3 oz), SpO2 95%.,Body mass index is 32.44 kg/m².      Intake/Output Summary (Last 24 hours) at 7/29/2024 0717  Last data filed at 7/28/2024 2201  Gross per 24 hour   Intake 716 ml   Output 550 ml   Net 166 ml       Invasive Devices       Peripheral Intravenous Line  Duration             Peripheral IV 07/25/24 Left Forearm 4 days    Peripheral IV 07/27/24 Right;Ventral (anterior) Forearm 2 days                    Physical Exam:   General: " "NAD  Skin: Warm, dry, anicteric  HEENT: Normocephalic, atraumatic  CV: RRR, no m/r/g, R DP doppler signal  Pulm: CTA b/l, no inc WOB, 3L NC  Abd: Soft, ND/NT  MSK: R great toe dry gangrene and foul smelling, dressing present  Neuro: AOx3, GCS 15     Lab, Imaging and other studies:I have personally reviewed pertinent lab results.  , CBC:   Lab Results   Component Value Date    WBC 36.81 (H) 07/29/2024    HGB 7.7 (L) 07/29/2024    HCT 23.4 (L) 07/29/2024    MCV 95 07/29/2024     (H) 07/29/2024    RBC 2.47 (L) 07/29/2024    MCH 31.2 07/29/2024    MCHC 32.9 07/29/2024    RDW 16.2 (H) 07/29/2024    MPV 10.4 07/29/2024   , CMP:   Lab Results   Component Value Date    SODIUM 142 07/29/2024    K 4.0 07/29/2024     07/29/2024    CO2 25 07/29/2024    BUN 47 (H) 07/29/2024    CREATININE 1.55 (H) 07/29/2024    CALCIUM 8.3 (L) 07/29/2024     (H) 07/29/2024    ALT 78 (H) 07/29/2024    ALKPHOS 103 07/29/2024    EGFR 32 07/29/2024   , Coagulation: No results found for: \"PT\", \"INR\", \"APTT\"  VTE Pharmacologic Prophylaxis: Heparin  VTE Mechanical Prophylaxis: sequential compression device  "

## 2024-07-29 NOTE — PROGRESS NOTES
Progress Note - Anamaria Parnell 79 y.o. female MRN: [id number]    Unit/Bed#: [place] [address] -01 Encounter: [id number]    24 hr events  Patient developed intermittent sinus bradycardia overnight to 30s-40s.   Patient was symptomatic  Currently the patient is very confused  Metoprolol was stopped    Recommendations  -clonidine stopped  -continue to monitor for bradycardia off of metoprolol and clonidine.   -delirium work up per primary team    Assessment/Plan  79F with PMH of DM2, LBBB, severe PAD, CKD3, who was admitted for right toe dry gangrene in the setting of chronic limb ischemia.  S/p left to right fem-fem bypass on 7/24- vascular planning for fem-pop later this week.   Hospital course has been complicated by new afib, intermittent symptomatic sinus bradycardia, leukocytosis, hospital delirium.    #dry gangrene  #severe lower extremity PAD  R toe dry gangrene due to chronic PAD.  Has had L to R fem-fem bypass, vascular planning for fem-pop bypass later this admission.   -currently on aspirin and ticagrelor.    -will need to discuss when starting AC    #afib  #sinus bradycardia- sick sinus syndrome  #tachy-bradysyndrome  Patient with known first degree AV block and LBBB. She developed new afib and was started on metoprolol.  Now having intermittent sever symptomatic sinus bradycardia to 30s. Currently in NSR. Bradycardia likely from sick sinus syndrome and meds (metop and clonidine)- if ongoing bradycardia will need a pacemaker.   -hold metoprolol  -hold clonidine.   -hold off on AC right now, will need to be started prior to DC  -monitor for further bradycardia.  If she has ongoing symptomatic bradycardia will need pacing- likely temp pacer or temp perm given ongoing gangrene and leukocytosis.  Micra would be difficult given wound vacs in lower abdomen.     Subjective:   Patient is very confused this morning.  She states she does not have know how she got here she does not know where she is, she does not know  "her name or birthdate.  She cannot remember if she has any siblings or children. \"I think I have a son but I dont know\".    Objective:     Vitals: Blood pressure 134/72, pulse 76, temperature (!) 97.1 °F (36.2 °C), resp. rate 16, height 4' 10\" (1.473 m), weight 70.4 kg (155 lb 3.3 oz), SpO2 100%.,Body mass index is 32.44 kg/m².      Intake/Output Summary (Last 24 hours) at 7/29/2024 1020  Last data filed at 7/28/2024 2201  Gross per 24 hour   Intake 596 ml   Output 450 ml   Net 146 ml       Physical Exam:   Constitutional:       Appearance: sitting up in chair.   HENT:      Head: Normocephalic and atraumatic.   Neck:      Vascular: No JVD   Cardiovascular:      Rate and Rhythm: RRR, no murmurs  Pulmonary:      Effort: CTA-b  Abdominal:      General: Abdomen is flat. Wound vac below abdominal pannus.   Musculoskeletal:     No edema  Skin:     General: Skin is warm.   Neurological:      Mental Status: she is disoriented   Psychiatric:         Behavior: Behavior normal.   "

## 2024-07-29 NOTE — PLAN OF CARE
Problem: PAIN - ADULT  Goal: Verbalizes/displays adequate comfort level or baseline comfort level  Description: Interventions:  - Encourage patient to monitor pain and request assistance  - Assess pain using appropriate pain scale  - Administer analgesics based on type and severity of pain and evaluate response  - Implement non-pharmacological measures as appropriate and evaluate response  - Consider cultural and social influences on pain and pain management  - Notify physician/advanced practitioner if interventions unsuccessful or patient reports new pain  Outcome: Progressing     Problem: INFECTION - ADULT  Goal: Absence or prevention of progression during hospitalization  Description: INTERVENTIONS:  - Assess and monitor for signs and symptoms of infection  - Monitor lab/diagnostic results  - Monitor all insertion sites, i.e. indwelling lines, tubes, and drains  - Monitor endotracheal if appropriate and nasal secretions for changes in amount and color  - Bledsoe appropriate cooling/warming therapies per order  - Administer medications as ordered  - Instruct and encourage patient and family to use good hand hygiene technique  - Identify and instruct in appropriate isolation precautions for identified infection/condition  Outcome: Progressing     Problem: SAFETY ADULT  Goal: Patient will remain free of falls  Description: INTERVENTIONS:  - Educate patient/family on patient safety including physical limitations  - Instruct patient to call for assistance with activity   - Consult OT/PT to assist with strengthening/mobility   - Keep Call bell within reach  - Keep bed low and locked with side rails adjusted as appropriate  - Keep care items and personal belongings within reach  - Initiate and maintain comfort rounds  - Make Fall Risk Sign visible to staff  - Offer Toileting every 2 Hours, in advance of need  - Initiate/Maintain bed alarm  - Apply yellow socks and bracelet for high fall risk patients  - Consider  moving patient to room near nurses station  Outcome: Progressing  Goal: Maintain or return to baseline ADL function  Description: INTERVENTIONS:  -  Assess patient's ability to carry out ADLs; assess patient's baseline for ADL function and identify physical deficits which impact ability to perform ADLs (bathing, care of mouth/teeth, toileting, grooming, dressing, etc.)  - Assess/evaluate cause of self-care deficits   - Assess range of motion  - Assess patient's mobility; develop plan if impaired  - Assess patient's need for assistive devices and provide as appropriate  - Encourage maximum independence but intervene and supervise when necessary  - Involve family in performance of ADLs  - Assess for home care needs following discharge   - Consider OT consult to assist with ADL evaluation and planning for discharge  - Provide patient education as appropriate  Outcome: Progressing  Goal: Maintains/Returns to pre admission functional level  Description: INTERVENTIONS:  - Perform AM-PAC 6 Click Basic Mobility/ Daily Activity assessment daily.  - Set and communicate daily mobility goal to care team and patient/family/caregiver.   - Collaborate with rehabilitation services on mobility goals if consulted  - Stand patient 3 times a day  - Ambulate patient 3 times a day  - Out of bed to chair 3 times a day   - Out of bed for meals 3 times a day  - Out of bed for toileting  - Record patient progress and toleration of activity level   Outcome: Progressing     Problem: DISCHARGE PLANNING  Goal: Discharge to home or other facility with appropriate resources  Description: INTERVENTIONS:  - Identify barriers to discharge w/patient and caregiver  - Arrange for needed discharge resources and transportation as appropriate  - Identify discharge learning needs (meds, wound care, etc.)  - Arrange for interpretive services to assist at discharge as needed  - Refer to Case Management Department for coordinating discharge planning if the  patient needs post-hospital services based on physician/advanced practitioner order or complex needs related to functional status, cognitive ability, or social support system  Outcome: Progressing     Problem: Knowledge Deficit  Goal: Patient/family/caregiver demonstrates understanding of disease process, treatment plan, medications, and discharge instructions  Description: Complete learning assessment and assess knowledge base.  Interventions:  - Provide teaching at level of understanding  - Provide teaching via preferred learning methods  Outcome: Progressing     Problem: CARDIOVASCULAR - ADULT  Goal: Maintains optimal cardiac output and hemodynamic stability  Description: INTERVENTIONS:  - Monitor I/O, vital signs and rhythm  - Monitor for S/S and trends of decreased cardiac output  - Administer and titrate ordered vasoactive medications to optimize hemodynamic stability  - Assess quality of pulses, skin color and temperature  - Assess for signs of decreased coronary artery perfusion  - Instruct patient to report change in severity of symptoms  Outcome: Progressing     Problem: METABOLIC, FLUID AND ELECTROLYTES - ADULT  Goal: Electrolytes maintained within normal limits  Description: INTERVENTIONS:  - Monitor labs and assess patient for signs and symptoms of electrolyte imbalances  - Administer electrolyte replacement as ordered  - Monitor response to electrolyte replacements, including repeat lab results as appropriate  - Instruct patient on fluid and nutrition as appropriate  Outcome: Progressing  Goal: Fluid balance maintained  Description: INTERVENTIONS:  - Monitor labs   - Monitor I/O and WT  - Instruct patient on fluid and nutrition as appropriate  - Assess for signs & symptoms of volume excess or deficit  Outcome: Progressing  Goal: Glucose maintained within target range  Description: INTERVENTIONS:  - Monitor Blood Glucose as ordered  - Assess for signs and symptoms of hyperglycemia and hypoglycemia  -  Administer ordered medications to maintain glucose within target range  - Assess nutritional intake and initiate nutrition service referral as needed  Outcome: Progressing     Problem: HEMATOLOGIC - ADULT  Goal: Maintains hematologic stability  Description: INTERVENTIONS  - Assess for signs and symptoms of bleeding or hemorrhage  - Monitor labs  - Administer supportive blood products/factors as ordered and appropriate  Outcome: Progressing     Problem: MUSCULOSKELETAL - ADULT  Goal: Maintain or return mobility to safest level of function  Description: INTERVENTIONS:  - Assess patient's ability to carry out ADLs; assess patient's baseline for ADL function and identify physical deficits which impact ability to perform ADLs (bathing, care of mouth/teeth, toileting, grooming, dressing, etc.)  - Assess/evaluate cause of self-care deficits   - Assess range of motion  - Assess patient's mobility  - Assess patient's need for assistive devices and provide as appropriate  - Encourage maximum independence but intervene and supervise when necessary  - Involve family in performance of ADLs  - Assess for home care needs following discharge   - Consider OT consult to assist with ADL evaluation and planning for discharge  - Provide patient education as appropriate  Outcome: Progressing  Goal: Maintain proper alignment of affected body part  Description: INTERVENTIONS:  - Support, maintain and protect limb and body alignment  - Provide patient/ family with appropriate education  Outcome: Progressing     Problem: NEUROSENSORY - ADULT  Goal: Achieves maximal functionality and self care  Description: INTERVENTIONS  - Monitor swallowing and airway patency with patient fatigue and changes in neurological status  - Encourage and assist patient to increase activity and self care.   - Encourage visually impaired, hearing impaired and aphasic patients to use assistive/communication devices  Outcome: Progressing     Problem: Prexisting or  High Potential for Compromised Skin Integrity  Goal: Skin integrity is maintained or improved  Description: INTERVENTIONS:  - Identify patients at risk for skin breakdown  - Assess and monitor skin integrity  - Assess and monitor nutrition and hydration status  - Monitor labs   - Assess for incontinence   - Turn and reposition patient  - Assist with mobility/ambulation  - Relieve pressure over bony prominences  - Avoid friction and shearing  - Provide appropriate hygiene as needed including keeping skin clean and dry  - Evaluate need for skin moisturizer/barrier cream  - Collaborate with interdisciplinary team   - Patient/family teaching  - Consider wound care consult   Outcome: Progressing     Problem: Nutrition/Hydration-ADULT  Goal: Nutrient/Hydration intake appropriate for improving, restoring or maintaining nutritional needs  Description: Monitor and assess patient's nutrition/hydration status for malnutrition. Collaborate with interdisciplinary team and initiate plan and interventions as ordered.  Monitor patient's weight and dietary intake as ordered or per policy. Utilize nutrition screening tool and intervene as necessary. Determine patient's food preferences and provide high-protein, high-caloric foods as appropriate.     INTERVENTIONS:  - Monitor oral intake, urinary output, labs, and treatment plans  - Assess nutrition and hydration status and recommend course of action  - Evaluate amount of meals eaten  - Assist patient with eating if necessary   - Allow adequate time for meals  - Recommend/ encourage appropriate diets, oral nutritional supplements, and vitamin/mineral supplements  - Order, calculate, and assess calorie counts as needed  - Recommend, monitor, and adjust tube feedings and TPN/PPN based on assessed needs  - Assess need for intravenous fluids  - Provide specific nutrition/hydration education as appropriate  - Include patient/family/caregiver in decisions related to nutrition  Outcome:  Progressing

## 2024-07-29 NOTE — QUICK NOTE
Noted to be tachycardiac to 150s.  IV lopressor 2.5mg administered w/HR 120s-150s concerning for a fib w/rvr.  Fluid bolus administered and dose of iv lopressor administered as well given SBP in 100s.  If persists will start cardizem gtt.  Recent echo reviewed.  check tsh consult cardiology.

## 2024-07-29 NOTE — PROGRESS NOTES
Son at bedside, offers concerns regarding pt ongoing SOB with exertion. Son asks if pt should be on oxygen. Pts VSS with oxygen saturations % on RA. Pt does endorse SOB and orthopnea which has been ongoing and has +2 pitting edema to B/L LE. Lungs diminished. Son requesting [facility] attending’s input. Message relayed to Dr. Perez regarding same. Per Dr. Perez, no new orders or changes at this time. Pt remains on RA, telemetry and assessment unchanged. If pt desaturates and requires oxygen [facility] will reassess. Pt torsemide restarted by nephrology yesterday. Will monitor.

## 2024-07-29 NOTE — PROGRESS NOTES
Pt noted to have episodes of bradycardia on telemetry, new from previous episodes of tachycardia and afib from last evening. Dr. Chris VALVERDE attending made aware. Photo sent via secure chat of most recent telemetry strip. Per Dr. Perez, cardiology notified by him regarding same. No new orders at this time. Remainder of VS stable. Pt only complaint continues to be SOB with exertion and orthopnea. Continues saturating well on RA. Will monitor.

## 2024-07-29 NOTE — PROGRESS NOTES
Podiatry - Progress Note  Patient: Anamaria Parnell 79 y.o. female   MRN: [id number]  PCP: Ritchie Lawton MD  Unit/Bed#: [place] [address] -01 Encounter: [id number]  Date Of Visit: 07/29/24    ASSESSMENT:    Anamaria Parnell is a 79 y.o. female with:    Right hallux gangrene, Davidson grade 4  Type 2 diabetes mellitus with most recent hemoglobin A1c of 5.9%  Stage IV chronic kidney disease  Aortoiliac occlusive disease, peripheral arterial disease      PLAN:    Patient evaluated at bedside, of note there is minor bogginess noted to the plantar aspect of the hallux.  Minor localized erythema surrounding the hallux.  Given that patient has had elevated white count as well as new onset disorientation we will plan for disarticulation of the hallux tonight 7/29/2024 for source control.  Discussed with primary team and vascular surgery who in agreement.    N.p.o. orders in  New x-ray right foot personally reviewed: In comparison to the previous x-rays on the lateral view there is minor SEKOU noted in the plantar soft tissues around the proximal phalanx of the hallux.    ID consulted for broad-spectrum antibiotics.  Discussed case with patient's son, Austin at [phone].  Patient's son is in agreement regarding the amputation of the hallux giving the high concern of infection.  Consent to be signed at bedside with patient's son  Continue local wound care Betadine DSD to right hallux.  Follow-up ID consult.  Elevation and offloading on green foam wedges or pillows when non-ambulatory.  Rest of care per primary team.     Weightbearing status: Weightbearing as tolerated right foot in surgical shoe    SUBJECTIVE:     The patient was seen, evaluated, and assessed at bedside today.  Patient was not AAOx3 and could not answer any basic questions regarding her care or personal life.  Patient seemed to be panicky.  Patient did deny any nausea vomiting shortness of breath fever chest pain no.  Patient denies any pedal pain at this  "time.      OBJECTIVE:     Vitals:   /72   Pulse 76   Temp (!) 97.1 °F (36.2 °C)   Resp 16   Ht 4' 10\" (1.473 m)   Wt 70.4 kg (155 lb 3.3 oz)   LMP  (LMP Unknown)   SpO2 100%   BMI 32.44 kg/m²     Temp (24hrs), Av.3 °F (36.3 °C), Min:97.1 °F (36.2 °C), Max:97.5 °F (36.4 °C)      Physical Exam:     Lungs: Non labored breathing  Abdomen: Soft, non-tender.  Lower Extremity:  Cardiovascular status at baseline from admission.  Neurological status at baseline from admission.  Musculoskeletal status at baseline from admission. No calf tenderness noted.     RLE: Dry gangrene of the hallux.  There is localized erythema surrounding, however there is no fluctuance crepitus or   purulence noted at this time.              Additional Data:     Labs:    Results from last 7 days   Lab Units 24  0252 24  0529 24  0501   WBC Thousand/uL 36.81*   < > 24.68*   HEMOGLOBIN g/dL 7.7*   < > 8.2*   HEMATOCRIT % 23.4*   < > 24.6*   PLATELETS Thousands/uL 422*   < > 307   SEGS PCT %  --   --  82*   LYMPHO PCT % 8*  --  5*   MONO PCT % 7  --  7   EOS PCT % 0  --  0    < > = values in this interval not displayed.     Results from last 7 days   Lab Units 24  0252 24  1939 24  1551   POTASSIUM mmol/L 4.0   < >  --    CHLORIDE mmol/L 108   < >  --    CO2 mmol/L 25   < >  --    CO2, I-STAT mmol/L  --   --  18*   BUN mg/dL 47*   < >  --    CREATININE mg/dL 1.55*   < >  --    CALCIUM mg/dL 8.3*   < >  --    ALK PHOS U/L 103   < >  --    ALT U/L 78*   < >  --    AST U/L 229*   < >  --    GLUCOSE, ISTAT mg/dl  --   --  179*    < > = values in this interval not displayed.     Results from last 7 days   Lab Units 24  1504   INR  1.51*       * I Have Reviewed All Lab Data Listed Above.    Recent Cultures (last 7 days):               Imaging: I have personally reviewed pertinent films in PACS  EKG, Pathology, and Other Studies: I have personally reviewed pertinent reports.    ** Please Note: " "Portions of the record may have been created with voice recognition software. Occasional wrong word or \"sound a like\" substitutions may have occurred due to the inherent limitations of voice recognition software. Read the chart carefully and recognize, using context, where substitutions have occurred. **      "

## 2024-07-29 NOTE — CASE MANAGEMENT
Case Management Discharge Planning Note    Patient name Anamaria Parnell  Location [place] [address] /South 2 M* MRN [id number]  : 1945 Date 2024       Current Admission Date: 2024  Current Admission Diagnosis:Gangrene (HCC)   Patient Active Problem List    Diagnosis Date Noted Date Diagnosed    Transaminitis 2024     Pulmonary hypertension (HCC) 2024     Chronic HFrEF (heart failure with reduced ejection fraction) (HCC) 2024     Paroxysmal atrial fibrillation (HCC) 2024     Coronary artery disease of native artery of native heart with stable angina pectoris (HCC) 2024     Cellulitis of toe of right foot 2024     Sepsis (HCC) 2024     Gangrene ([facility]) 2024     Diabetic ulcer of toe of right foot associated with diabetes mellitus due to underlying condition, limited to breakdown of skin (HCC) 2024     Carotid stenosis, asymptomatic, right 10/27/2023     Complex renal cyst 10/18/2023     Encounter for follow-up surveillance of urothelial carcinoma of lower urinary tract 10/18/2023     Encounter to discuss test results 10/17/2023     Smoking 2023     Renal cyst 2023     Primary hypertension 2023     Dysarthria 08/15/2023     Stage 3b chronic kidney disease (HCC)      Aortoiliac occlusive disease (HCC) 10/11/2022     History of gastrointestinal stromal tumor (GIST) 2022     Secondary hyperparathyroidism of renal origin (HCC) 2022     Gastrointestinal stromal tumor (GIST) (HCC) 2022     Type 2 diabetes mellitus, without long-term current use of insulin (HCC) 2021     Type 2 diabetes mellitus with diabetic peripheral angiopathy without gangrene (HCC) 2021     Depression, recurrent (HCC) 2021     Acute renal failure superimposed on stage 4 chronic kidney disease (HCC) 2020     Hypertensive kidney disease with stage 4 chronic kidney disease (HCC) 2020     Cervical radiculopathy 2020      Malignant neoplasm of overlapping sites of bladder ([facility]) 04/24/2020     Stenosis of left anterior descending artery Mid LAD 50-60% stenosis 04/01/2020     Right coronary artery occlusion ([facility])total occlusion of proximal RCA with collateral circ 04/01/2020     Pulmonary nodule 7 mm groundglass opacity in the right upper lobe, unchanged since October 2019 03/19/2020     Atherosclerosis of native arteries of right leg with ulceration of other part of foot ([facility]) 03/03/2020     Symptomatic carotid artery stenosis, left 03/03/2020     Femoral artery stenosis, right (HCC) 50-75% stenosis in the common femoral artery. 01/14/2020     Mesenteric artery stenosis ([facility]) 01/14/2020     Positive cardiac stress test  small, mildly severe, partially reversible myocardial perfusion defect of anterior and inferior wall  11/12/2019     Abnormal CT of the chest suspicious for an infectious or inflammatory bronchiolitis. 11/07/2019     Celiac artery stenosis ([facility]) >70% stenosis in the celiac trunk 11/05/2019     Aortoiliac stenosis, left ([facility]) 02/25/2019     Spondylosis of lumbar region without myelopathy or radiculopathy 01/29/2019     Lumbosacral spondylosis without myelopathy 01/29/2019     Statin intolerance 01/22/2019     Lumbar disc herniation 01/22/2019     Herniated lumbar intervertebral disc 01/22/2019     Chronic GERD 12/04/2017     Acute renal failure superimposed on stage 3 chronic kidney disease ([facility]) 12/04/2017     Claudication in peripheral vascular disease ([facility]) 12/04/2017     Gout 12/04/2017     Hx-TIA (transient ischemic attack) 12/04/2017     Hyperlipidemia associated with type 2 diabetes mellitus  ([facility]) 12/04/2017     Hypertension associated with diabetes  ([facility]) 12/04/2017     Osteoarthritis 12/04/2017     Right renal artery stenosis ([facility]) 12/04/2017     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 #[id number] - [place] - [address]  [address]  [facility] [address]  Phone: [phone] Fax: [phone]    Primary Care Provider: Ritchie Lawton MD    Primary Insurance: [facility]  Secondary Insurance:     DISCHARGE DETAILS:      Treatment Team Recommendation: Acute Hospital Transfer  Discharge Destination Plan:: Acute Hospital Transfer  Transport at Discharge : [facility]        Transported by (Company and Unit #): PACS        Saima Nj

## 2024-07-29 NOTE — PLAN OF CARE
Problem: PAIN - ADULT  Goal: Verbalizes/displays adequate comfort level or baseline comfort level  Description: Interventions:  - Encourage patient to monitor pain and request assistance  - Assess pain using appropriate pain scale  - Administer analgesics based on type and severity of pain and evaluate response  - Implement non-pharmacological measures as appropriate and evaluate response  - Consider cultural and social influences on pain and pain management  - Notify physician/advanced practitioner if interventions unsuccessful or patient reports new pain  Outcome: Progressing     Problem: MUSCULOSKELETAL - ADULT  Goal: Maintain or return mobility to safest level of function  Description: INTERVENTIONS:  - Assess patient's ability to carry out ADLs; assess patient's baseline for ADL function and identify physical deficits which impact ability to perform ADLs (bathing, care of mouth/teeth, toileting, grooming, dressing, etc.)  - Assess/evaluate cause of self-care deficits   - Assess range of motion  - Assess patient's mobility  - Assess patient's need for assistive devices and provide as appropriate  - Encourage maximum independence but intervene and supervise when necessary  - Involve family in performance of ADLs  - Assess for home care needs following discharge   - Consider OT consult to assist with ADL evaluation and planning for discharge  - Provide patient education as appropriate  Outcome: Progressing  Goal: Maintain proper alignment of affected body part  Description: INTERVENTIONS:  - Support, maintain and protect limb and body alignment  - Provide patient/ family with appropriate education  Outcome: Progressing

## 2024-07-29 NOTE — ANESTHESIA POSTPROCEDURE EVALUATION
"Post-Op Assessment Note    CV Status:  Stable    Pain management: adequate       Mental Status:  Alert and awake   Hydration Status:  Euvolemic   PONV Controlled:  Controlled   Airway Patency:  Patent     Post Op Vitals Reviewed: Yes    No anethesia notable event occurred.    Staff: Anesthesiologist, CRNA               /63 (07/29/24 1939)    Temp (!) 97.2 °F (36.2 °C) (07/29/24 1939)    Pulse 92 (07/29/24 1939)   Resp      SpO2 95 % (07/29/24 1939)    /63   Pulse 92   Temp (!) 97.2 °F (36.2 °C)   Resp 14   Ht 4' 10\" (1.473 m)   Wt 70.4 kg (155 lb 3.3 oz)   LMP  (LMP Unknown)   SpO2 95%   BMI 32.44 kg/m²     "

## 2024-07-30 ENCOUNTER — APPOINTMENT (INPATIENT)
Dept: CT IMAGING | Facility: HOSPITAL | Age: 79
DRG: 278 | End: 2024-07-30
Payer: COMMERCIAL

## 2024-07-30 PROBLEM — K72.00 ACUTE LIVER FAILURE WITHOUT HEPATIC COMA: Status: ACTIVE | Noted: 2024-07-30

## 2024-07-30 PROBLEM — R79.89 ELEVATED TROPONIN: Status: ACTIVE | Noted: 2024-07-30

## 2024-07-30 PROBLEM — G93.40 ENCEPHALOPATHY: Status: ACTIVE | Noted: 2024-07-30

## 2024-07-30 LAB
ALBUMIN SERPL BCG-MCNC: 2.9 G/DL (ref 3.5–5)
ALBUMIN SERPL BCG-MCNC: 3 G/DL (ref 3.5–5)
ALP SERPL-CCNC: 110 U/L (ref 34–104)
ALP SERPL-CCNC: 111 U/L (ref 34–104)
ALT SERPL W P-5'-P-CCNC: 126 U/L (ref 7–52)
ALT SERPL W P-5'-P-CCNC: 724 U/L (ref 7–52)
AMMONIA PLAS-SCNC: 73 UMOL/L (ref 18–72)
ANION GAP SERPL CALCULATED.3IONS-SCNC: 13 MMOL/L (ref 4–13)
ANION GAP SERPL CALCULATED.3IONS-SCNC: 14 MMOL/L (ref 4–13)
APTT PPP: 30 SECONDS (ref 23–37)
AST SERPL W P-5'-P-CCNC: 3936 U/L (ref 13–39)
AST SERPL W P-5'-P-CCNC: 411 U/L (ref 13–39)
BASOPHILS # BLD AUTO: 0.1 THOUSANDS/ÂΜL (ref 0–0.1)
BASOPHILS NFR BLD AUTO: 0 % (ref 0–1)
BILIRUB SERPL-MCNC: 0.67 MG/DL (ref 0.2–1)
BILIRUB SERPL-MCNC: 0.75 MG/DL (ref 0.2–1)
BUN SERPL-MCNC: 56 MG/DL (ref 5–25)
BUN SERPL-MCNC: 70 MG/DL (ref 5–25)
CALCIUM ALBUM COR SERPL-MCNC: 8.9 MG/DL (ref 8.3–10.1)
CALCIUM ALBUM COR SERPL-MCNC: 9 MG/DL (ref 8.3–10.1)
CALCIUM SERPL-MCNC: 8 MG/DL (ref 8.4–10.2)
CALCIUM SERPL-MCNC: 8.2 MG/DL (ref 8.4–10.2)
CARDIAC TROPONIN I PNL SERPL HS: 3820 NG/L (ref 8–18)
CARDIAC TROPONIN I PNL SERPL HS: 4677 NG/L (ref 8–18)
CHLORIDE SERPL-SCNC: 108 MMOL/L (ref 96–108)
CHLORIDE SERPL-SCNC: 109 MMOL/L (ref 96–108)
CO2 SERPL-SCNC: 19 MMOL/L (ref 21–32)
CO2 SERPL-SCNC: 21 MMOL/L (ref 21–32)
CREAT SERPL-MCNC: 1.8 MG/DL (ref 0.6–1.3)
CREAT SERPL-MCNC: 2.17 MG/DL (ref 0.6–1.3)
EOSINOPHIL # BLD AUTO: 0.03 THOUSAND/ÂΜL (ref 0–0.61)
EOSINOPHIL NFR BLD AUTO: 0 % (ref 0–6)
ERYTHROCYTE [DISTWIDTH] IN BLOOD BY AUTOMATED COUNT: 18.9 % (ref 11.6–15.1)
ERYTHROCYTE [DISTWIDTH] IN BLOOD BY AUTOMATED COUNT: 19.8 % (ref 11.6–15.1)
GFR SERPL CREATININE-BSD FRML MDRD: 21 ML/MIN/1.73SQ M
GFR SERPL CREATININE-BSD FRML MDRD: 26 ML/MIN/1.73SQ M
GLUCOSE SERPL-MCNC: 195 MG/DL (ref 65–140)
GLUCOSE SERPL-MCNC: 200 MG/DL (ref 65–140)
GLUCOSE SERPL-MCNC: 203 MG/DL (ref 65–140)
GLUCOSE SERPL-MCNC: 221 MG/DL (ref 65–140)
GLUCOSE SERPL-MCNC: 240 MG/DL (ref 65–140)
GLUCOSE SERPL-MCNC: 253 MG/DL (ref 65–140)
HCT VFR BLD AUTO: 20.6 % (ref 34.8–46.1)
HCT VFR BLD AUTO: 27.2 % (ref 34.8–46.1)
HGB BLD-MCNC: 6.4 G/DL (ref 11.5–15.4)
HGB BLD-MCNC: 8.3 G/DL (ref 11.5–15.4)
IMM GRANULOCYTES # BLD AUTO: >0.5 THOUSAND/UL (ref 0–0.2)
IMM GRANULOCYTES NFR BLD AUTO: 11 % (ref 0–2)
INR PPP: 2.1 (ref 0.84–1.19)
LACTATE SERPL-SCNC: 4.6 MMOL/L (ref 0.5–2)
LYMPHOCYTES # BLD AUTO: 1.28 THOUSANDS/ÂΜL (ref 0.6–4.47)
LYMPHOCYTES NFR BLD AUTO: 3 % (ref 14–44)
MAGNESIUM SERPL-MCNC: 2.1 MG/DL (ref 1.9–2.7)
MCH RBC QN AUTO: 31.1 PG (ref 26.8–34.3)
MCH RBC QN AUTO: 32 PG (ref 26.8–34.3)
MCHC RBC AUTO-ENTMCNC: 30.5 G/DL (ref 31.4–37.4)
MCHC RBC AUTO-ENTMCNC: 31.1 G/DL (ref 31.4–37.4)
MCV RBC AUTO: 100 FL (ref 82–98)
MCV RBC AUTO: 105 FL (ref 82–98)
MONOCYTES # BLD AUTO: 1.48 THOUSAND/ÂΜL (ref 0.17–1.22)
MONOCYTES NFR BLD AUTO: 4 % (ref 4–12)
NEUTROPHILS # BLD AUTO: 30.44 THOUSANDS/ÂΜL (ref 1.85–7.62)
NEUTS SEG NFR BLD AUTO: 82 % (ref 43–75)
NRBC BLD AUTO-RTO: 4 /100 WBCS
PHOSPHATE SERPL-MCNC: 4.8 MG/DL (ref 2.3–4.1)
PLATELET # BLD AUTO: 341 THOUSANDS/UL (ref 149–390)
PLATELET # BLD AUTO: 365 THOUSANDS/UL (ref 149–390)
PMV BLD AUTO: 10.4 FL (ref 8.9–12.7)
PMV BLD AUTO: 10.5 FL (ref 8.9–12.7)
POTASSIUM SERPL-SCNC: 4.7 MMOL/L (ref 3.5–5.3)
POTASSIUM SERPL-SCNC: 5.2 MMOL/L (ref 3.5–5.3)
PROT SERPL-MCNC: 5.5 G/DL (ref 6.4–8.4)
PROT SERPL-MCNC: 5.5 G/DL (ref 6.4–8.4)
PROTHROMBIN TIME: 23.7 SECONDS (ref 11.6–14.5)
RBC # BLD AUTO: 2.06 MILLION/UL (ref 3.81–5.12)
RBC # BLD AUTO: 2.59 MILLION/UL (ref 3.81–5.12)
SODIUM SERPL-SCNC: 142 MMOL/L (ref 135–147)
SODIUM SERPL-SCNC: 142 MMOL/L (ref 135–147)
WBC # BLD AUTO: 37.5 THOUSAND/UL (ref 4.31–10.16)
WBC # BLD AUTO: 42.69 THOUSAND/UL (ref 4.31–10.16)

## 2024-07-30 PROCEDURE — 82140 ASSAY OF AMMONIA: CPT | Performed by: INTERNAL MEDICINE

## 2024-07-30 PROCEDURE — 99233 SBSQ HOSP IP/OBS HIGH 50: CPT | Performed by: INTERNAL MEDICINE

## 2024-07-30 PROCEDURE — 99232 SBSQ HOSP IP/OBS MODERATE 35: CPT | Performed by: INTERNAL MEDICINE

## 2024-07-30 PROCEDURE — 97164 PT RE-EVAL EST PLAN CARE: CPT

## 2024-07-30 PROCEDURE — 84100 ASSAY OF PHOSPHORUS: CPT | Performed by: INTERNAL MEDICINE

## 2024-07-30 PROCEDURE — NC001 PR NO CHARGE: Performed by: PODIATRIST

## 2024-07-30 PROCEDURE — 85027 COMPLETE CBC AUTOMATED: CPT | Performed by: INTERNAL MEDICINE

## 2024-07-30 PROCEDURE — 83735 ASSAY OF MAGNESIUM: CPT | Performed by: INTERNAL MEDICINE

## 2024-07-30 PROCEDURE — 85610 PROTHROMBIN TIME: CPT | Performed by: PHYSICIAN ASSISTANT

## 2024-07-30 PROCEDURE — 97168 OT RE-EVAL EST PLAN CARE: CPT

## 2024-07-30 PROCEDURE — 93005 ELECTROCARDIOGRAM TRACING: CPT

## 2024-07-30 PROCEDURE — 80053 COMPREHEN METABOLIC PANEL: CPT | Performed by: INTERNAL MEDICINE

## 2024-07-30 PROCEDURE — 70450 CT HEAD/BRAIN W/O DYE: CPT

## 2024-07-30 PROCEDURE — P9016 RBC LEUKOCYTES REDUCED: HCPCS

## 2024-07-30 PROCEDURE — 83605 ASSAY OF LACTIC ACID: CPT | Performed by: INTERNAL MEDICINE

## 2024-07-30 PROCEDURE — 85730 THROMBOPLASTIN TIME PARTIAL: CPT | Performed by: PHYSICIAN ASSISTANT

## 2024-07-30 PROCEDURE — 30233N1 TRANSFUSION OF NONAUTOLOGOUS RED BLOOD CELLS INTO PERIPHERAL VEIN, PERCUTANEOUS APPROACH: ICD-10-PCS | Performed by: INTERNAL MEDICINE

## 2024-07-30 PROCEDURE — 84484 ASSAY OF TROPONIN QUANT: CPT | Performed by: INTERNAL MEDICINE

## 2024-07-30 PROCEDURE — 82948 REAGENT STRIP/BLOOD GLUCOSE: CPT

## 2024-07-30 PROCEDURE — 99024 POSTOP FOLLOW-UP VISIT: CPT | Performed by: SURGERY

## 2024-07-30 RX ORDER — METRONIDAZOLE 500 MG/1
500 TABLET ORAL EVERY 8 HOURS SCHEDULED
Status: DISCONTINUED | OUTPATIENT
Start: 2024-07-30 | End: 2024-08-01

## 2024-07-30 RX ORDER — LACTULOSE 10 G/15ML
20 SOLUTION ORAL 3 TIMES DAILY
Status: DISCONTINUED | OUTPATIENT
Start: 2024-07-30 | End: 2024-08-03

## 2024-07-30 RX ORDER — ALBUMIN, HUMAN INJ 5% 5 %
25 SOLUTION INTRAVENOUS ONCE
Status: COMPLETED | OUTPATIENT
Start: 2024-07-30 | End: 2024-07-31

## 2024-07-30 RX ADMIN — INSULIN LISPRO 1 UNITS: 100 INJECTION, SOLUTION INTRAVENOUS; SUBCUTANEOUS at 10:45

## 2024-07-30 RX ADMIN — LACTULOSE 20 G: 20 SOLUTION ORAL at 23:40

## 2024-07-30 RX ADMIN — INSULIN LISPRO 1 UNITS: 100 INJECTION, SOLUTION INTRAVENOUS; SUBCUTANEOUS at 17:09

## 2024-07-30 RX ADMIN — CEFAZOLIN SODIUM 1000 MG: 1 SOLUTION INTRAVENOUS at 13:57

## 2024-07-30 RX ADMIN — INSULIN LISPRO 1 UNITS: 100 INJECTION, SOLUTION INTRAVENOUS; SUBCUTANEOUS at 13:28

## 2024-07-30 RX ADMIN — METRONIDAZOLE 500 MG: 500 TABLET ORAL at 22:39

## 2024-07-30 RX ADMIN — ASPIRIN 81 MG CHEWABLE TABLET 81 MG: 81 TABLET CHEWABLE at 14:49

## 2024-07-30 RX ADMIN — ESCITALOPRAM OXALATE 20 MG: 20 TABLET ORAL at 14:49

## 2024-07-30 RX ADMIN — CEFAZOLIN SODIUM 1000 MG: 1 SOLUTION INTRAVENOUS at 01:45

## 2024-07-30 RX ADMIN — SODIUM BICARBONATE 650 MG TABLET 650 MG: at 17:08

## 2024-07-30 RX ADMIN — ALLOPURINOL 100 MG: 100 TABLET ORAL at 14:48

## 2024-07-30 RX ADMIN — PRAVASTATIN SODIUM 80 MG: 80 TABLET ORAL at 17:08

## 2024-07-30 RX ADMIN — HEPARIN SODIUM 5000 UNITS: 5000 INJECTION INTRAVENOUS; SUBCUTANEOUS at 13:53

## 2024-07-30 RX ADMIN — METRONIDAZOLE 500 MG: 500 INJECTION, SOLUTION INTRAVENOUS at 05:16

## 2024-07-30 RX ADMIN — HEPARIN SODIUM 5000 UNITS: 5000 INJECTION INTRAVENOUS; SUBCUTANEOUS at 05:16

## 2024-07-30 RX ADMIN — METRONIDAZOLE 500 MG: 500 TABLET ORAL at 14:48

## 2024-07-30 NOTE — WOUND OSTOMY CARE
Progress Note - Wound   Anamaria Parnell 79 y.o. female MRN: [id number]  Unit/Bed#: [place] [address] -01 Encounter: [id number]        Assessment:   Patient seen today for wound care follow up assessment. Patient is dependent for all care, incontinent of bowel and bladder, max assist with turning from side to side. Patient is also receiving blood transfusion today and is s/p great toe amputation. ATR turning system in place.    POA stage II B/L buttocks- scab has lifted from left buttocks revealing small partial thickness wound, both buttocks with pink tissue, periwound skin is fragile, no drainage present. Recommending to continue with foam dressing unless patient's incontinence becomes more frequent then may have to change to barrier cream. Primary nurse to reach out to team should this become an issue.    No induration, fluctuance, odor, warmth/temperature differences, redness, or purulence noted to the above noted wounds and skin areas assessed. New dressings applied per orders listed below. Patient tolerated well- no s/s of non-verbal pain or discomfort observed during the encounter. Bedside nurse aware of plan of care. See flow sheets for more detailed assessment findings.  Wound care will continue to follow, call or Secure Chat Message with questions.       Wound Care Plan:   1-Apply Remedy silicone cream/hydraguard lotion to bilateral heels twice daily for skin protection/hydration.  2-Elevate heels off of bed/chair surface to offload pressure.  3-Offloading air cushion in chair when out of bed.  4-Apply lotion to body daily and as needed.  5-Turn and reposition every 2 hours while in bed and weight shift frequently while in the chair to re-distribute pressure on skin.    6-Sacrum/buttocks--cleanse with soap and water, pat dry.  Apply silicone bordered foam dressing for treatment.  Change dressing every other day and as needed with soilage.  7-Groin incisions--per vascular surgery team.    Wound 07/16/24 Pressure  Injury Buttocks Left (Active)   Wound Image   07/30/24 1102   Wound Description Fragile;Pink 07/30/24 1102   Pressure Injury Stage 2 07/30/24 1102   Miley-wound Assessment Fragile 07/30/24 1102   Wound Length (cm) 0.5 cm 07/30/24 1102   Wound Width (cm) 0.2 cm 07/30/24 1102   Wound Depth (cm) 0.1 cm 07/30/24 1102   Wound Surface Area (cm^2) 0.1 cm^2 07/30/24 1102   Wound Volume (cm^3) 0.01 cm^3 07/30/24 1102   Calculated Wound Volume (cm^3) 0.01 cm^3 07/30/24 1102   Wound Site Closure MARIS 07/30/24 1102   Drainage Amount None 07/30/24 1102   Non-staged Wound Description Partial thickness 07/30/24 1102   Treatments Cleansed 07/30/24 1102   Dressing Foam, Silicon (eg. Allevyn, etc) 07/30/24 1102   Wound packed? No 07/30/24 1102   Packing- # removed 0 07/30/24 1102   Packing- # inserted 0 07/30/24 1102   Dressing Changed New 07/30/24 1102   Patient Tolerance Tolerated well 07/30/24 1102   Dressing Status Clean;Dry;Intact 07/30/24 1102       Wound 07/19/24 Pressure Injury Buttocks Right (Active)   Wound Image   07/26/24 1257   Wound Description Pink 07/30/24 1102   Pressure Injury Stage 2 07/30/24 1102   Miley-wound Assessment Pink;Scar Tissue 07/30/24 1102   Wound Length (cm) 0.6 cm 07/30/24 1102   Wound Width (cm) 0.5 cm 07/30/24 1102   Wound Depth (cm) 0.1 cm 07/30/24 1102   Wound Surface Area (cm^2) 0.3 cm^2 07/30/24 1102   Wound Volume (cm^3) 0.03 cm^3 07/30/24 1102   Calculated Wound Volume (cm^3) 0.03 cm^3 07/30/24 1102   Change in Wound Size % 25 07/30/24 1102   Wound Site Closure MARIS 07/30/24 1102   Drainage Amount None 07/30/24 1102   Non-staged Wound Description Partial thickness 07/30/24 1102   Treatments Cleansed 07/30/24 1102   Dressing Foam, Silicon (eg. Allevyn, etc) 07/30/24 1102   Wound packed? No 07/30/24 1102   Packing- # removed 0 07/30/24 1102   Packing- # inserted 0 07/30/24 1102   Dressing Changed New 07/30/24 1102   Patient Tolerance Tolerated well 07/30/24 1102   Dressing Status Clean;Dry;Intact  07/30/24 1102         Marquita TIJERINAN, RN, CWOCN

## 2024-07-30 NOTE — ASSESSMENT & PLAN NOTE
Wt Readings from Last 3 Encounters:   07/28/24 70.4 kg (155 lb 3.3 oz)   07/19/24 67.2 kg (148 lb 2.4 oz)   07/15/24 65.3 kg (144 lb)     Followed by cardiology, continue diuretics  AV node blockers on hold secondary to bradycardia  Remains 96% SpO2 on room air, not in acute exacerbation

## 2024-07-30 NOTE — PROGRESS NOTES
"Progress Note - Anamaria Parnell 79 y.o. female MRN: [id number]    Unit/Bed#: [place] [address] -01 Encounter: [id number]      Assessment:  76-year-old female with PAD and aortic disease, right CL TI and right great toe dry gangrene, s/p bilateral femoral endarterectomy with left to right femorofemoral PTFE bypass and retrograde left JACI, left EIA stenting, and bilateral groin VAC placement on 7/24.  S/p 7/29 right hallux amputation partial first ray resection.     Plan:  -Continue to monitor neurovascular exams  -Wound VAC over bilateral groin.  3 black sponges, 1 to each wound, 1 bridge 0 white sponges.  VAC changes every Monday, Wednesday, and Friday.  -Planning on right-sided femoral to below-knee popliteal artery bypass on 8/2.  -Appreciate ID recommendations for antibiotics cefazolin/Flagyl  -Appreciate podiatry recommendations  -Monitor morning labs  -DVT prophylaxis  -Pain and nausea control  -Remainder of care per primary team    Subjective:   No acute overnight events.  Patient says she slept well without any pain.  Patient denies any sensation or motor loss.  Patient denies any nausea, vomiting, fevers or chills.  Patient denies any pain throughout her extremities.    Objective:     Vitals: Blood pressure 108/62, pulse 93, temperature (!) 97.2 °F (36.2 °C), temperature source Oral, resp. rate 18, height 4' 10\" (1.473 m), weight 70.4 kg (155 lb 3.3 oz), SpO2 97%.,Body mass index is 32.44 kg/m².      Intake/Output Summary (Last 24 hours) at 7/29/2024 2136  Last data filed at 7/29/2024 2001  Gross per 24 hour   Intake 880 ml   Output 550 ml   Net 330 ml     Recent Labs     07/29/24  0252 07/30/24  0449   WBC 36.81* 37.50*   HGB 7.7* 6.4*   * 365   SODIUM 142 142   K 4.0 4.7    108   CO2 25 21   BUN 47* 56*   CREATININE 1.55* 1.80*   GLUC 206* 240*   CALCIUM 8.3* 8.0*   MG 2.0 2.1   PHOS  --  4.8*   * 411*   ALT 78* 126*   ALKPHOS 103 110*   TBILI 0.63 0.67        Physical Exam: General " appearance: alert and oriented, in no acute distress  Lungs: clear to auscultation bilaterally  Heart: regular rate and rhythm, S1, S2 normal, no murmur, click, rub or gallop  Abdomen: soft, non-tender; bowel sounds normal; no masses,  no organomegaly  Pulses: Positive right DP Doppler signal.   Skin: Skin color, texture, turgor normal. No rashes or lesions     Wound VAC: 300 cc serosanguineous  Urine output: 600 cc    Invasive Devices       Peripheral Intravenous Line  Duration             Peripheral IV 07/25/24 Left Forearm 4 days    Peripheral IV 07/27/24 Right;Ventral (anterior) Forearm 2 days                    Lab, Imaging and other studies: I have personally reviewed pertinent reports.    VTE Pharmacologic Prophylaxis: Heparin  VTE Mechanical Prophylaxis: sequential compression device

## 2024-07-30 NOTE — ASSESSMENT & PLAN NOTE
Patient noted to have elevated troponin this afternoon as part of encephalopathy workup; currently 4677  Tachycardic overnight and anemic, likely elevated in demand ischemia  Have reversed anemia with blood transfusion hemoglobin currently 8.2  Monitor on telemetry; obtain stat EKG  Have discussed with cardiology, reverse likely contributing factors and reassess

## 2024-07-30 NOTE — PROGRESS NOTES
The metronidazole has been converted to Oral per [facility] IV-to-PO Auto-Conversion Protocol for Adults as approved by the Pharmacy and Therapeutics Committee. The patient met all eligible criteria:  1) Age = 18 years old   2) Received at least one dose of the IV form   3) Receiving at least one other scheduled oral/enteral medication   4) Tolerating an oral/enteral diet   and did not have any exclusions:   1) Critical care patient   2) Active GI bleed (IF assessing H2RAs or PPIs)   3) Continuous tube feeding (IF assessing cipro, doxycycline, levofloxacin, minocycline, rifampin, or voriconazole)   4) Receiving PO vancomycin (IF assessing metronidazole)   5) Persistent nausea and/or vomiting   6) Ileus or gastrointestinal obstruction   7) Milo/nasogastric tube set for continuous suction   8) Specific order not to automatically convert to PO (in the order's comments or if discussed in the most recent Infectious Disease or primary team's progress notes).

## 2024-07-30 NOTE — ASSESSMENT & PLAN NOTE
"Converted spontaneously before she was placed on telemetry. ECG reviewed with cardiology.  Per cardiology: \"Given her anemia, DAPT for recent surgery, recent surgery and low A-fib burden, will hold off starting anticoagulation for now.\"     7/29-update: Patient now with tachybradycardia syndrome; cardiology recommending transfer to [place] sometime following Friday's vascular surgery procedure for possible implantation of pacemaker.  "

## 2024-07-30 NOTE — ASSESSMENT & PLAN NOTE
Baseline 1.4-1.7  RICARDO likely due to sepsis / pre-renal.  Appreciate nephrology recommendations, creatinine now within normal limits  Monitor and reconsult nephrology as needed      Recent Labs     07/27/24  0501 07/28/24  0529 07/29/24  0252   BUN 38* 41* 47*   CREATININE 1.71* 1.59* 1.55*   EGFR 28 31 32

## 2024-07-30 NOTE — ASSESSMENT & PLAN NOTE
Patient noted to be acutely confused this morning  Likely multifactorial table to late afternoon early evening anesthesia and lack of sleep overnight    However, patient with history of stroke and given recent surgical procedure, some concern for possible CVA versus TIA  Repeat labs pending; consult to geriatrics  Stat CT head obtained, no acute events noted  Continue DAPT therapy for now  Trop highly elevated, have discussed with cardiology, correct reversible causes of possible demand ischemia   EKG with no significant changes since yesterday-no STEMI  Reassess in a.m., discussed with geriatrics    Workup of altered mental status:  Alcohol - neg etoh intake  Epilepsy/electrolytes: no history of seizure disorder; labs stable  Insulin - blood glucose WNL  Opiates and oxygen: SpO2-96% on room air; opiates used sparingly  Uremia - creatinine elevated, but stable; will obtain ammonia levels given profound increase in LFTs  Trauma/temperature - atraumatic; afebrile; not hypothermic  Infection -source control obtained surgically yesterday, on antibiotics now  Poisons/psychogenic - no hx of acute poisoning; medications reviewed; unclear if psychogenic  Stroke/seizures: CT head negative for acute bleed or ischemic event; no hx of seizure activity

## 2024-07-30 NOTE — ASSESSMENT & PLAN NOTE
"Well Woman VISIT      CHIEF COMPLAINT  Chief Complaint   Patient presents with    Headache    Dizziness       HPI  Sapphire Gallo is a 37 y.o. female who presents for wellness.     Social Factors  Tobacco use: No  Ready to Quit: No  Alcohol: No  Intimate partner violence screening  "Do you feel safe in your current relationship?" Yes   "Have you ever been in a relationship in which your partner frightened you or hurt you?" No  Living Will/POA: No  Regular Exercise: No    Depression  Over the past two weeks, have you felt down, depressed, or hopeless? No  Over the past two weeks, have you felt little interest or pleasure in doing things? No    Reproductive Health  Followed by OBGYN    CHD, HTN, DM2  CHD Risk Factors: obesity (BMI >= 30 kg/m2)  Women 45 years and older should be screened for dyslipidemia if at increased risk of CHD  Women 20 to 45 years of age should be screened for dyslipidemia if at increased risk of CHD  Asymptomatic adults with sustained blood pressure greater than 135/80 mm Hg (treated or untreated) should be screened for type 2 diabetes mellitus    Estimated body mass index is 46.2 kg/m² as calculated from the following:    Height as of this encounter: 5' (1.524 m).    Weight as of this encounter: 107.3 kg (236 lb 8.9 oz).      Screening  Mammogram needed: n/a  Colonoscopy needed: n/a  Osteoporosis screen needed: n/a     Women 50 to 74 years of age should be screened for breast cancer with mammography biennially.  Women should be screened for cervical cancer with Pap tests beginning at 21 years of age. Low-risk women should receive Pap testing every three years. Co-testing for human papillomavirus is an option beginning at 30 years of age, and can extend the screening interval to five years. Cervical cancer screening should be discontinued at 65 years of age or after total hysterectomy if the woman has a benign gynecologic history  Adults 50 to 75 years of age should be screened for " History of CVA. MRI 7/2023 showing: Large area of acute to subacute ischemia left PCA distribution occipital lobe. Small foci of acute to subacute ischemia in the left frontal and left parietal/temporal lobes MCA distribution.  Continue Aspirin and statin   colorectal cancer with an FOBT annually, sigmoidoscopy every five years with an FOBT every three years, or colonoscopy every 10 years.  Women 65 years and older should be screened for osteoporosis. Women younger than 65 years should be screened if the risk of fracture is greater than or equal to that of a 65-year-old white woman without additional risk factors.      Immunizations  delayed    ALLERGIES and MEDS were verified.   PMHx, PSHx, FHx, SOCIALHx were updated as pertinent.    REVIEW OF SYSTEMS  Review of Systems   Constitutional: Negative.    HENT: Negative.     Eyes: Negative.    Respiratory: Negative.     Cardiovascular: Negative.    Gastrointestinal: Negative.    Genitourinary: Negative.    Musculoskeletal: Negative.    Skin: Negative.    Neurological:  Positive for dizziness.   Psychiatric/Behavioral:  The patient is nervous/anxious.        PHYSICAL EXAM  VITAL SIGNS: /78   Pulse 79   Temp 98.4 °F (36.9 °C) (Oral)   Resp 18   Ht 5' (1.524 m)   Wt 107.3 kg (236 lb 8.9 oz)   LMP 04/28/2023 (Exact Date)   SpO2 98%   BMI 46.20 kg/m²   GEN: Well developed, Well nourished, No acute distress.  HENT: Normocephalic, Atraumatic, Bilateral external ears normal, Nose normal, Oropharynx moist, No oral exudates.   Eyes: PERRL, EOMI, Conjunctiva normal, No discharge.   Neck: Supple, No tenderness.  Lymphatic: No cervical or supraclavicular lymphadenopathy noted.   Cardiovascular: Normal heart rate, Normal rhythm, No murmurs, No rubs, No gallops.   Thorax & Lungs: Normal breath sounds, No respiratory distress, No wheezing.  Abdomen: Soft, No tenderness, Bowel sounds normal.  Skin: Warm, Dry, No erythema, No rash.   Extremities: No edema, No tenderness.       ASSESSMENT/PLAN    Sapphire was seen today for headache and dizziness.    Diagnoses and all orders for this visit:    Well woman exam without gynecological exam  -     CBC Auto Differential; Future  -     Comprehensive Metabolic Panel; Future  -     Lipid  Panel; Future  -     Urinalysis, Reflex to Urine Culture Urine, Clean Catch; Future  -     TSH; Future  -     Hemoglobin A1C; Future    Dizziness    Palpitations  -     EKG 12-lead    Morbid obesity       Ekg was normal  Labs today as she is fasting  Patient denies anxiety or depression, but is very nervous about recent dizzy episodes  Call with any concerns and will consider CT should dizzy episodes continue  She is to hydrate with water and avoid a lot of sugary drinks  Patient states she has gained a lot of weight since her last pregnancy and was encouraged to make some diet modifications to help with weight loss    FOLLOW UP: 6 months or sooner if needed      Rosalio Yan MD

## 2024-07-30 NOTE — PLAN OF CARE
Problem: PAIN - ADULT  Goal: Verbalizes/displays adequate comfort level or baseline comfort level  Description: Interventions:  - Encourage patient to monitor pain and request assistance  - Assess pain using appropriate pain scale  - Administer analgesics based on type and severity of pain and evaluate response  - Implement non-pharmacological measures as appropriate and evaluate response  - Consider cultural and social influences on pain and pain management  - Notify physician/advanced practitioner if interventions unsuccessful or patient reports new pain  Outcome: Progressing     Problem: INFECTION - ADULT  Goal: Absence or prevention of progression during hospitalization  Description: INTERVENTIONS:  - Assess and monitor for signs and symptoms of infection  - Monitor lab/diagnostic results  - Monitor all insertion sites, i.e. indwelling lines, tubes, and drains  - Monitor endotracheal if appropriate and nasal secretions for changes in amount and color  - [place] appropriate cooling/warming therapies per order  - Administer medications as ordered  - Instruct and encourage patient and family to use good hand hygiene technique  - Identify and instruct in appropriate isolation precautions for identified infection/condition  Outcome: Progressing     Problem: SAFETY ADULT  Goal: Patient will remain free of falls  Description: INTERVENTIONS:  - Educate patient/family on patient safety including physical limitations  - Instruct patient to call for assistance with activity   - Consult OT/PT to assist with strengthening/mobility   - Keep Call bell within reach  - Keep bed low and locked with side rails adjusted as appropriate  - Keep care items and personal belongings within reach  - Initiate and maintain comfort rounds  - Make Fall Risk Sign visible to staff  - Offer Toileting every 2 Hours, in advance of need  - Initiate/Maintain bed alarm  - Apply yellow socks and bracelet for high fall risk patients  - Consider  moving patient to room near nurses station  Outcome: Progressing  Goal: Maintain or return to baseline ADL function  Description: INTERVENTIONS:  -  Assess patient's ability to carry out ADLs; assess patient's baseline for ADL function and identify physical deficits which impact ability to perform ADLs (bathing, care of mouth/teeth, toileting, grooming, dressing, etc.)  - Assess/evaluate cause of self-care deficits   - Assess range of motion  - Assess patient's mobility; develop plan if impaired  - Assess patient's need for assistive devices and provide as appropriate  - Encourage maximum independence but intervene and supervise when necessary  - Involve family in performance of ADLs  - Assess for home care needs following discharge   - Consider OT consult to assist with ADL evaluation and planning for discharge  - Provide patient education as appropriate  Outcome: Progressing  Goal: Maintains/Returns to pre admission functional level  Description: INTERVENTIONS:  - Perform AM-PAC 6 Click Basic Mobility/ Daily Activity assessment daily.  - Set and communicate daily mobility goal to care team and patient/family/caregiver.   - Collaborate with rehabilitation services on mobility goals if consulted  - Stand patient 3 times a day  - Ambulate patient 3 times a day  - Out of bed to chair 3 times a day   - Out of bed for meals 3 times a day  - Out of bed for toileting  - Record patient progress and toleration of activity level   Outcome: Progressing     Problem: DISCHARGE PLANNING  Goal: Discharge to home or other facility with appropriate resources  Description: INTERVENTIONS:  - Identify barriers to discharge w/patient and caregiver  - Arrange for needed discharge resources and transportation as appropriate  - Identify discharge learning needs (meds, wound care, etc.)  - Arrange for interpretive services to assist at discharge as needed  - Refer to Case Management Department for coordinating discharge planning if the  patient needs post-hospital services based on physician/advanced practitioner order or complex needs related to functional status, cognitive ability, or social support system  Outcome: Progressing     Problem: Knowledge Deficit  Goal: Patient/family/caregiver demonstrates understanding of disease process, treatment plan, medications, and discharge instructions  Description: Complete learning assessment and assess knowledge base.  Interventions:  - Provide teaching at level of understanding  - Provide teaching via preferred learning methods  Outcome: Progressing     Problem: CARDIOVASCULAR - ADULT  Goal: Maintains optimal cardiac output and hemodynamic stability  Description: INTERVENTIONS:  - Monitor I/O, vital signs and rhythm  - Monitor for S/S and trends of decreased cardiac output  - Administer and titrate ordered vasoactive medications to optimize hemodynamic stability  - Assess quality of pulses, skin color and temperature  - Assess for signs of decreased coronary artery perfusion  - Instruct patient to report change in severity of symptoms  Outcome: Progressing     Problem: METABOLIC, FLUID AND ELECTROLYTES - ADULT  Goal: Electrolytes maintained within normal limits  Description: INTERVENTIONS:  - Monitor labs and assess patient for signs and symptoms of electrolyte imbalances  - Administer electrolyte replacement as ordered  - Monitor response to electrolyte replacements, including repeat lab results as appropriate  - Instruct patient on fluid and nutrition as appropriate  Outcome: Progressing  Goal: Fluid balance maintained  Description: INTERVENTIONS:  - Monitor labs   - Monitor I/O and WT  - Instruct patient on fluid and nutrition as appropriate  - Assess for signs & symptoms of volume excess or deficit  Outcome: Progressing  Goal: Glucose maintained within target range  Description: INTERVENTIONS:  - Monitor Blood Glucose as ordered  - Assess for signs and symptoms of hyperglycemia and hypoglycemia  -  Administer ordered medications to maintain glucose within target range  - Assess nutritional intake and initiate nutrition service referral as needed  Outcome: Progressing     Problem: HEMATOLOGIC - ADULT  Goal: Maintains hematologic stability  Description: INTERVENTIONS  - Assess for signs and symptoms of bleeding or hemorrhage  - Monitor labs  - Administer supportive blood products/factors as ordered and appropriate  Outcome: Progressing     Problem: MUSCULOSKELETAL - ADULT  Goal: Maintain or return mobility to safest level of function  Description: INTERVENTIONS:  - Assess patient's ability to carry out ADLs; assess patient's baseline for ADL function and identify physical deficits which impact ability to perform ADLs (bathing, care of mouth/teeth, toileting, grooming, dressing, etc.)  - Assess/evaluate cause of self-care deficits   - Assess range of motion  - Assess patient's mobility  - Assess patient's need for assistive devices and provide as appropriate  - Encourage maximum independence but intervene and supervise when necessary  - Involve family in performance of ADLs  - Assess for home care needs following discharge   - Consider OT consult to assist with ADL evaluation and planning for discharge  - Provide patient education as appropriate  Outcome: Progressing  Goal: Maintain proper alignment of affected body part  Description: INTERVENTIONS:  - Support, maintain and protect limb and body alignment  - Provide patient/ family with appropriate education  Outcome: Progressing     Problem: Prexisting or High Potential for Compromised Skin Integrity  Goal: Skin integrity is maintained or improved  Description: INTERVENTIONS:  - Identify patients at risk for skin breakdown  - Assess and monitor skin integrity  - Assess and monitor nutrition and hydration status  - Monitor labs   - Assess for incontinence   - Turn and reposition patient  - Assist with mobility/ambulation  - Relieve pressure over bony  prominences  - Avoid friction and shearing  - Provide appropriate hygiene as needed including keeping skin clean and dry  - Evaluate need for skin moisturizer/barrier cream  - Collaborate with interdisciplinary team   - Patient/family teaching  - Consider wound care consult   Outcome: Progressing     Problem: Nutrition/Hydration-ADULT  Goal: Nutrient/Hydration intake appropriate for improving, restoring or maintaining nutritional needs  Description: Monitor and assess patient's nutrition/hydration status for malnutrition. Collaborate with interdisciplinary team and initiate plan and interventions as ordered.  Monitor patient's weight and dietary intake as ordered or per policy. Utilize nutrition screening tool and intervene as necessary. Determine patient's food preferences and provide high-protein, high-caloric foods as appropriate.     INTERVENTIONS:  - Monitor oral intake, urinary output, labs, and treatment plans  - Assess nutrition and hydration status and recommend course of action  - Evaluate amount of meals eaten  - Assist patient with eating if necessary   - Allow adequate time for meals  - Recommend/ encourage appropriate diets, oral nutritional supplements, and vitamin/mineral supplements  - Order, calculate, and assess calorie counts as needed  - Recommend, monitor, and adjust tube feedings and TPN/PPN based on assessed needs  - Assess need for intravenous fluids  - Provide specific nutrition/hydration education as appropriate  - Include patient/family/caregiver in decisions related to nutrition  Outcome: Progressing     Problem: NEUROSENSORY - ADULT  Goal: Achieves maximal functionality and self care  Description: INTERVENTIONS  - Monitor swallowing and airway patency with patient fatigue and changes in neurological status  - Encourage and assist patient to increase activity and self care.   - Encourage visually impaired, hearing impaired and aphasic patients to use assistive/communication  devices  Outcome: Progressing

## 2024-07-30 NOTE — QUICK NOTE
Informed by nurse that patient's left foot showed bloody strikethrough after the surgery.  Her blood pressure was lowered also.  Patient was then seen and evaluated at bedside.  At the time of evaluation the blood pressure is stabilized.  Dressing was removed and the surgical site was inspected.  Oozing bleeding was found on the dorsal flap of the surgical side at the junction of the dermis and subcutaneous tissue.  No significant arterial or venous bleeding was found.  2 pieces of Surgifoam, iodoform packing, 4 x 4, ABD, Kerlix dressing was further applied to the patient's left foot.  2 rounds of Webril and light compression Ace bandage were further reinforced on her left foot.  Patient and nurse were informed to contact podiatry again if there is significant strikethrough.  Patient tolerated well to the procedure with complaint of back pain, which will be further addressed by the primary team according to the nurse.

## 2024-07-30 NOTE — ASSESSMENT & PLAN NOTE
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

## 2024-07-30 NOTE — ASSESSMENT & PLAN NOTE
"Converted spontaneously before she was placed on telemetry. ECG reviewed with cardiology.  Per cardiology: \"Given her anemia, DAPT for recent surgery, recent surgery and low A-fib burden, will hold off starting anticoagulation for now.\"     7/29-update: Patient now with tachybradycardia syndrome; cardiology recommending transfer to [place] sometime following Friday's vascular surgery procedure for possible implantation of pacemaker.  "

## 2024-07-30 NOTE — PROGRESS NOTES
"[facility]  Progress Note  Name: Anamaria Parnell I  MRN: [id number]  Unit/Bed#: [place] [address] -01 I Date of Admission: 7/19/2024   Date of Service: 7/29/2024 I Hospital Day: 10    Assessment & Plan   * Aortoiliac occlusive disease (HCC)  Assessment & Plan  PAD and aortic disease, R CLTI and R great toe dry gangrene. S/P Bilateral femoral endarterectomy with L to R fem-fem PTFE bypass and retrograde L JACI, L EIA stenting, bilateral groin VAC placement on 7/24   Intervention planned for friday    Chronic HFrEF (heart failure with reduced ejection fraction) (HCC)  Assessment & Plan  Wt Readings from Last 3 Encounters:   07/28/24 70.4 kg (155 lb 3.3 oz)   07/19/24 67.2 kg (148 lb 2.4 oz)   07/15/24 65.3 kg (144 lb)               Paroxysmal atrial fibrillation (HCC)  Assessment & Plan  Converted spontaneously before she was placed on telemetry. ECG reviewed with cardiology.  Per cardiology: \"Given her anemia, DAPT for recent surgery, recent surgery and low A-fib burden, will hold off starting anticoagulation for now.\"     7/29-update: Patient now with tachybradycardia syndrome; cardiology recommending transfer to [place] sometime following Friday's vascular surgery procedure for possible implantation of pacemaker.    Coronary artery disease of native artery of native heart with stable angina pectoris (HCC)  Assessment & Plan  CAD with history of PCI.  No chest pain.  On DAPT  Seen in consultation with cardiology for preoperative risk assessment    Cellulitis of toe of right foot  Assessment & Plan  Patient presenting with right big toe gangrene and associated cellulitis  Patient remains afebrile, nontoxic; however white count trended up substantially overnight, source control needed  Continue IV Ancef  Patient underwent right hallux amputation this afternoon, monitor and await further podiatry recommendations    Primary hypertension  Assessment & Plan  Controlled  Continue amlodipine, " clonidine, metoprolol    Type 2 diabetes mellitus, without long-term current use of insulin (HCC)  Assessment & Plan  Lab Results   Component Value Date    HGBA1C 5.9 (H) 07/09/2024     Recent Labs     07/29/24  0719 07/29/24  1105 07/29/24  1615 07/29/24  1830   POCGLU 216* 191* 148* 147*       Home regimen: Tradjenta  Inpatient regimen: Sliding scale      Depression, recurrent (HCC)  Assessment & Plan  Continue Lexapro    Acute renal failure superimposed on stage 4 chronic kidney disease (HCC)  Assessment & Plan  Baseline 1.4-1.7  RICARDO likely due to sepsis / pre-renal.  Appreciate nephrology recommendations, creatinine now within normal limits  Monitor and reconsult nephrology as needed      Recent Labs     07/27/24  0501 07/28/24  0529 07/29/24  0252   BUN 38* 41* 47*   CREATININE 1.71* 1.59* 1.55*   EGFR 28 31 32                 CVA (cerebral vascular accident) ([facility])  Assessment & Plan  History of CVA. MRI 7/2023 showing: Large area of acute to subacute ischemia left PCA distribution occipital lobe. Small foci of acute to subacute ischemia in the left frontal and left parietal/temporal lobes MCA distribution.  Continue Aspirin and statin    Basilar artery stenosis  Assessment & Plan  history of carotid surgery.    Follows with Dr. Gallo               VTE Pharmacologic Prophylaxis: VTE Score: 3 Moderate Risk (Score 3-4) - Pharmacological DVT Prophylaxis Ordered: heparin.    Mobility:   Basic Mobility Inpatient Raw Score: 12  JH-HLM Goal: 4: Move to chair/commode  JH-HLM Achieved: 6: Walk 10 steps or more  JH-HLM Goal NOT achieved. Continue with multidisciplinary rounding and encourage appropriate mobility to improve upon JH-HLM goals.    Patient Centered Rounds: I performed bedside rounds with nursing staff today.   Discussions with Specialists or Other Care Team Provider: Cardiology    Education and Discussions with Family / Patient: Updated  (son) at bedside.    Total Time Spent on Date of  Encounter in care of patient: 45 mins. This time was spent on one or more of the following: performing physical exam; counseling and coordination of care; obtaining or reviewing history; documenting in the medical record; reviewing/ordering tests, medications or procedures; communicating with other healthcare professionals and discussing with patient's family/caregivers.    Current Length of Stay: 10 day(s)  Current Patient Status: Inpatient   Certification Statement: The patient will continue to require additional inpatient hospital stay due to treatment of foot gangrene  Discharge Plan: Anticipate discharge in >72 hrs to discharge location to be determined pending rehab evaluations.    Code Status: Level 3 - DNAR and DNI    Subjective:   Patient seen and examined bedside, no acute distress or discomfort noted.  Patient underwent toe amputation tonight to obtain source control.  Continue antibiotics, await revascularization scheduled on .  Discussed at length with cardiology, unfortunately patient may have tachycardic bradycardia syndrome; will require pacemaker insertion once therapies to address gangrene have been completed.  Continue present therapy and await further vascular surgery and podiatry recommendations.    Objective:     Vitals:   Temp (24hrs), Av.4 °F (36.3 °C), Min:97.1 °F (36.2 °C), Max:97.9 °F (36.6 °C)    Temp:  [97.1 °F (36.2 °C)-97.9 °F (36.6 °C)] 97.2 °F (36.2 °C)  HR:  [76-92] 92  Resp:  [12-22] 18  BP: (108-134)/(51-72) 123/63  SpO2:  [86 %-100 %] 95 %  Body mass index is 32.44 kg/m².     Input and Output Summary (last 24 hours):     Intake/Output Summary (Last 24 hours) at 2024  Last data filed at 2024  Gross per 24 hour   Intake 880 ml   Output 550 ml   Net 330 ml       Physical Exam:   Physical Exam  Vitals and nursing note reviewed.   Constitutional:       General: She is not in acute distress.     Appearance: She is well-developed.   HENT:      Head:  Normocephalic and atraumatic.   Eyes:      Conjunctiva/sclera: Conjunctivae normal.   Cardiovascular:      Rate and Rhythm: Normal rate. Rhythm irregular.   Pulmonary:      Effort: Pulmonary effort is normal. No respiratory distress.   Abdominal:      Palpations: Abdomen is soft.      Tenderness: There is no abdominal tenderness.   Musculoskeletal:         General: Swelling, deformity and signs of injury present.      Cervical back: Neck supple.      Right lower leg: Edema present.   Skin:     General: Skin is warm and dry.   Neurological:      Mental Status: She is alert.   Psychiatric:         Mood and Affect: Mood normal.            Additional Data:     Labs:  Results from last 7 days   Lab Units 07/29/24  0252 07/28/24  0529 07/27/24  0501   WBC Thousand/uL 36.81*   < > 24.68*   HEMOGLOBIN g/dL 7.7*   < > 8.2*   HEMATOCRIT % 23.4*   < > 24.6*   PLATELETS Thousands/uL 422*   < > 307   BANDS PCT % 3  --   --    SEGS PCT %  --   --  82*   LYMPHO PCT % 8*  --  5*   MONO PCT % 7  --  7   EOS PCT % 0  --  0    < > = values in this interval not displayed.     Results from last 7 days   Lab Units 07/29/24  0252   SODIUM mmol/L 142   POTASSIUM mmol/L 4.0   CHLORIDE mmol/L 108   CO2 mmol/L 25   BUN mg/dL 47*   CREATININE mg/dL 1.55*   ANION GAP mmol/L 9   CALCIUM mg/dL 8.3*   ALBUMIN g/dL 3.0*   TOTAL BILIRUBIN mg/dL 0.63   ALK PHOS U/L 103   ALT U/L 78*   AST U/L 229*   GLUCOSE RANDOM mg/dL 206*     Results from last 7 days   Lab Units 07/24/24  1504   INR  1.51*     Results from last 7 days   Lab Units 07/29/24  1830 07/29/24  1615 07/29/24  1105 07/29/24  0719 07/29/24  0156 07/28/24  2115 07/28/24  1620 07/28/24  1130 07/28/24  0740 07/27/24  2054 07/27/24  1618 07/27/24  1152   POC GLUCOSE mg/dl 147* 148* 191* 216* 190* 187* 187* 148* 156* 173* 179* 164*         Results from last 7 days   Lab Units 07/24/24  1939   LACTIC ACID mmol/L 1.5       Lines/Drains:  Invasive Devices       Peripheral Intravenous Line   Duration             Peripheral IV 07/25/24 Left Forearm 4 days    Peripheral IV 07/27/24 Right;Ventral (anterior) Forearm 2 days                      Telemetry:  Telemetry Orders (From admission, onward)               24 Hour Telemetry Monitoring  Continuous x 24 Hours (Telem)        Question:  Reason for 24 Hour Telemetry  Answer:  Arrhythmias requiring acute medical intervention / PPM or ICD malfunction                                  Imaging: Reviewed radiology reports from this admission including: procedure reports    Recent Cultures (last 7 days):         Last 24 Hours Medication List:   Current Facility-Administered Medications   Medication Dose Route Frequency Provider Last Rate    allopurinol  100 mg Oral Daily Costa Omer DPM      aspirin  81 mg Oral Daily Costa Omer DPM      cefazolin  1,000 mg Intravenous Q12H APRIL BarrosoM 1,000 mg (07/29/24 1343)    escitalopram  20 mg Oral Daily Costa Omer DPM      heparin (porcine)  5,000 Units Subcutaneous Q8H JAVIER Costa Omer DPM      HYDROmorphone  0.2 mg Intravenous Q6H PRN Costa Omer DPM      insulin lispro  1-5 Units Subcutaneous TID AC Costa Omer DPM      lidocaine  1 patch Topical Daily Costa Omer DPM      LORazepam  0.5 mg Oral Q8H PRN Costa Omer DPM      metroNIDAZOLE  500 mg Intravenous Q8H Costa Omer  mg (07/29/24 1343)    ondansetron  4 mg Intravenous Q6H PRN Costa Omer DPM      oxyCODONE  5 mg Oral Q6H PRN Costa Omer DPM      pravastatin  80 mg Oral Daily With Dinner Costa Omer DPM      sodium bicarbonate  650 mg Oral BID after meals Costa Omer DPM      ticagrelor  90 mg Oral Q12H JAVIER Costa Omer DPM      torsemide  10 mg Oral Daily Costa Omer DPM      traZODone  50 mg Oral HS Costa Omer DPM          Today, Patient Was Seen By: Cullen Reinoso MD    **Please Note:  This note may have been constructed using a voice recognition system.**

## 2024-07-30 NOTE — OCCUPATIONAL THERAPY NOTE
Occupational Therapy RE-Evaluation     Patient Name: Anamaria Parnell  Today's Date: 7/30/2024  Problem List  Principal Problem:    Aortoiliac occlusive disease (HCC)  Active Problems:    Basilar artery stenosis    CVA (cerebral vascular accident) ([facility])    Acute renal failure superimposed on stage 4 chronic kidney disease (HCC)    Depression, recurrent (HCC)    Type 2 diabetes mellitus, without long-term current use of insulin (HCC)    Primary hypertension    Cellulitis of toe of right foot    Coronary artery disease of native artery of native heart with stable angina pectoris (HCC)    Paroxysmal atrial fibrillation (HCC)    Chronic HFrEF (heart failure with reduced ejection fraction) ([facility])    Past Medical History  Past Medical History:   Diagnosis Date    Aphasia as late effect of cerebrovascular accident (CVA) 08/16/2023    Arthritis     Chronic kidney disease     Diabetes mellitus ([facility])     Embolism and 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 ([facility]) 01/20/2021    Endometriosis     High cholesterol     History of left common carotid artery stent placement 10/27/2023    Hypertension     Kidney disease, chronic, stage III (moderate, EGFR 30-59 ml/min) ([facility])     Lumbar disc herniation     Median arcuate ligament syndrome ([facility]) 11/05/2019    Neuropathy     Obesity (BMI 30-39.9) 12/04/2017    Obesity, morbid ([facility]) 01/20/2021    Peripheral vascular disease ([facility])     Pneumonia     Shoulder injury     left    Spinal stenosis     Stroke ([facility]) 2015    Memory loss     Past Surgical History  Past Surgical History:   Procedure Laterality Date    ARTERIOGRAM Bilateral 7/24/2024    Procedure: ARTERIOGRAM;  Surgeon: Jose Crum DO;  Location: [facility];  Service: Vascular    CARDIAC CATHETERIZATION      CAROTID STENT Left 9/7/2023    Procedure: L TCAR;  Surgeon: Nicole Gallo MD;  [facility];  Service: Vascular    COLONOSCOPY      FL RETROGRADE PYELOGRAM  4/6/2020    FRACTURE SURGERY Right      ankle    IR CAROTID STENT  9/7/2023    IR LOWER EXTREMITY ANGIOGRAM  7/24/2024    OVARIAN CYST SURGERY      NM CYSTO BLADDER W/URETERAL CATHETERIZATION Bilateral 4/6/2020    Procedure: CYSTOSCOPY WITH RETROGRADE PYELOGRAM;  Surgeon: Robert Mitchell MD;  [facility]: [facility];  Service: Urology    NM CYSTO W/INSERT URETERAL STENT Right 4/6/2020    Procedure: INSERTION STENT URETERAL;  Surgeon: Robert Mitchell MD;  [facility];  Service: Urology    NM CYSTO W/REMOVAL OF TUMORS SMALL N/A 4/6/2020    Procedure: TRANSURETHRAL RESECTION OF BLADDER TUMOR (TURBT);  Surgeon: Robert Mitchell MD;  [facility];  Service: Urology    NM TEAEC W/WO PATCH GRAFT COMMON FEMORAL Bilateral 7/24/2024    Procedure: Bilateral femoral endarterectomies with patch angioplasty; Retrograde iliac intervention, shockwave, stent placement and angioplasty; femoral to femoral bypass with PTFE;  Surgeon: Jose Crum DO;  [facility];  Service: Vascular    ROTATOR CUFF REPAIR Left     TOE AMPUTATION Right 7/29/2024    Procedure: Rigth Hallux amputation, partial 1st ray resection;  Surgeon: Eddie Farooq DPM;  [facility]: [facility];  Service: Podiatry    TONSILLECTOMY           07/30/24 1309   OT Last Visit   OT Visit Date 07/30/24   Note Type   Note type Re-Evaluation   Additional Comments greeted seated EOB. agreeable. pt edu on updated WBS of RLE NWB.   Pain Assessment   Pain Assessment Tool FLACC   Pain Rating: FLACC (Rest) - Face 0   Pain Rating: FLACC (Rest) - Legs 0   Pain Rating: FLACC (Rest) - Activity 0   Pain Rating: FLACC (Rest) - Cry 0   Pain Rating: FLACC (Rest) - Consolability 0   Score: FLACC (Rest) 0   Pain Rating: FLACC (Activity) - Face 1   Pain Rating: FLACC (Activity) - Legs 1   Pain Rating: FLACC (Activity) - Activity 1   Pain Rating: FLACC (Activity) - Cry 1   Pain Rating: FLACC (Activity) - Consolability 0   Score: FLACC (Activity) 4   Restrictions/Precautions   Weight Bearing Precautions  Per Order Yes   RLE Weight Bearing Per Order NWB   Other Precautions Cognitive;Chair Alarm;Bed Alarm;Multiple lines;Telemetry;Fall Risk;Pain  (wound Vac)   Home Living   Additional Comments refer to IE for complete PLOF   Prior Function   Comments refer to IE for complete PLOF   ADL   Where Assessed Edge of bed   Eating Assistance 5  Supervision/Setup   Grooming Assistance 5  Supervision/Setup   UB Bathing Assistance 4  Minimal Assistance   LB Bathing Assistance 2  Maximal Assistance   UB Dressing Assistance 3  Moderate Assistance   LB Dressing Assistance 2  Maximal Assistance   Toileting Assistance  1  Total Assistance   Bed Mobility   Sit to Supine 2  Maximal assistance   Additional items Assist x 2;Increased time required;Verbal cues;LE management   Transfers   Sit to Stand 2  Maximal assistance   Additional items Assist x 2;Increased time required;Verbal cues   Stand to Sit 2  Maximal assistance   Additional items Assist x 2;Increased time required;Verbal cues   Additional Comments unable to clear bed.   Balance   Static Sitting Fair -   Dynamic Sitting Poor +   Static Standing Zero   Activity Tolerance   Activity Tolerance Patient limited by fatigue;Patient limited by pain   Medical Staff Made Aware PT Alicia. SPT Ysabel   Nurse Made Aware ZULEYMA HANSEN Assessment   RUE Assessment X  (limitations at shoulders.)   LUE Assessment   LUE Assessment X  (limitations at shoulders)   Hand Function   Gross Motor Coordination Functional   Fine Motor Coordination Functional   Psychosocial   Psychosocial (WDL) WDL   Cognition   Overall Cognitive Status Impaired   Arousal/Participation Cooperative   Attention Attends with cues to redirect   Orientation Level Disoriented to place;Disoriented to time;Disoriented to situation   Memory Decreased short term memory;Decreased recall of recent events;Decreased recall of precautions   Following Commands Follows one step commands with increased time or repetition   Comments  worsening mental status from previous sessions.   Assessment   Limitation Decreased ADL status;Decreased UE strength;Decreased Safe judgement during ADL;Decreased endurance;Decreased self-care trans;Decreased high-level ADLs   Prognosis Fair   Assessment Anamaria Parnell is a 79 y.o. female seen for OT Re-evaluation s/p admit to [facility] on 7/19/2024 w/ Aortoiliac occlusive disease (HCC). Now s/p right partial first ray amputation, NWB RLE. Comorbidities affecting pt's functional performance at time of assessment include:  PVD, DM, CVA, CKD, HTN, spinal stenosis, L RTC repair, ankle fx, CAD--s/p stents . Pt with active OT orders and activity orders for Up and OOB as tolerated. Personal factors affecting pt at time of IE include:limited home support, difficulty performing ADLs, difficulty performing IADLs, difficulty performing transfers/mobility, WBS, fall risk , and functional decline .  Upon evaluation: Pt currently requires modA for UB ADLs, maxA for LB ADLs, total assist  for toileting, sit> supine maxAx2 for bed mobility, maxAx2 but unable to clear EOB for functional transfers, and N/A mobility 2* the following deficits impacting occupational performance:weakness, decreased strength , decreased balance, decreased activity tolerance, and orthopedic restrictions. VITALS during session: seated EOB 98/59> seated /50.  Pt to benefit from continued skilled OT tx while in the hospital to address deficits as defined above and maximize level of functional independence w ADL's and functional mobility. Occupational Performance areas to address include: eating, bathing/shower, toilet hygiene, dressing, health maintenance, and functional mobility. From OT standpoint, recommendation at time of d/c would be level 2 resources. OT to follow pt on caseload 3-5x/wk.   Goals   Patient Goals to go home.   STG Time Frame 3-5   Short Term Goal #1 Pt will improve activity tolerance to G for min 30 min txment sessions for increase  engagement in functional tasks   Short Term Goal #2 Pt will complete bed mobility at a supervision level w/ G balance/safety demonstrated to decrease caregiver assistance required   Short Term Goal  Pt will improve functional transfers to [place] on/off all surfaces using DME as needed w/ G balance/safety   LTG Time Frame 10-14   Long Term Goal #1 Pt will demonstrate 100% adherence to WBS (NWB RLE) during all functional activities w/o cues from therapist   Long Term Goal #2 Pt will complete toileting w/ [place] w/ G hygiene/thoroughness using DME as needed   Long Term Goal Pt will complete UB/LB dressing/self care w/ [place] using adaptive device and DME as needed   Plan   Treatment Interventions ADL retraining;Functional transfer training;UE strengthening/ROM;Endurance training;Patient/family training;Equipment evaluation/education;Compensatory technique education;Continued evaluation;Energy conservation   Goal Expiration Date 08/14/24   OT Treatment Day 3   OT Frequency 3-5x/wk   Discharge Recommendation   Rehab Resource Intensity Level, OT II (Moderate Resource Intensity)   Additional Comments  The patient's raw score on the AM-PAC Daily Activity Inpatient Short Form is 12. A raw score of less than 19 suggests the patient may benefit from discharge to post-acute rehabilitation services. Please refer to the recommendation of the Occupational Therapist for safe discharge planning.   AM-PAC Daily Activity Inpatient   Lower Body Dressing 1   Bathing 2   Toileting 1   Upper Body Dressing 2   Grooming 3   Eating 3   Daily Activity Raw Score 12   Daily Activity Standardized Score (Calc for Raw Score >=11) 30.6   AM-PAC Applied Cognition Inpatient   Following a Speech/Presentation 3   Understanding Ordinary Conversation 3   Taking Medications 1   Remembering Where Things Are Placed or Put Away 2   Remembering List of 4-5 Errands 2   Taking Care of Complicated Tasks 2   Applied Cognition Raw Score 13   Applied Cognition  Standardized Score 30.46   Anna Wood, OT

## 2024-07-30 NOTE — ASSESSMENT & PLAN NOTE
Wt Readings from Last 3 Encounters:   07/28/24 70.4 kg (155 lb 3.3 oz)   07/19/24 67.2 kg (148 lb 2.4 oz)   07/15/24 65.3 kg (144 lb)

## 2024-07-30 NOTE — PROGRESS NOTES
"Podiatry - Progress Note  Patient: Anamaria Parnell 79 y.o. female   MRN: [id number]  PCP: Ritchie Lawton MD  Unit/Bed#: [place] [address] [id number] Encounter: [id number]  Date Of Visit: 24    ASSESSMENT:    Anamaria Parnell is a 79 y.o. female with:    Right hallux gangrene, Davidson grade 4   - s/p right partial first ray amputation (DOS: 24)  Type 2 diabetes mellitus with most recent hemoglobin A1c of 5.9%  Stage IV chronic kidney disease  Aortoiliac occlusive disease, peripheral arterial disease    PLAN:    Patient POD1, s/p right partial first ray amputation.   Post-surgical dressing located on surgical site and affected extremity were left intact today.  Noted strikethrough overnight, dressings were changed at this time. No strikethrough today. Plan for next complete post-surgical dressing change tomorrow.  Reviewed labs/vitals. Patient is afebrile. However Hemoglobin now 6.4% - recommend transfusion. Discussed with primary team. Leukocytosis noted and consistent with values pre operatively.   Continue ABX therapy per ID recommendations. Aerobic/anaerobic cultures were obtained during surgical procedure for tailoring of therapy.  Pain is well controlled. Continue current pain management regimen.  Elevation on green foam wedges or pillows when non-ambulatory  Rest of care per primary team.    Weightbearing status: Nonweightbearing to right lower extremity       SUBJECTIVE:     The patient was seen, evaluated, and assessed at bedside today. The patient was awake, alert, and in no acute distress. The patient reports no pain at this time. Pain is well controlled with current pain management regimen. Patient reports normal appetite. Patient denies N/V/F/chills/SOB/CP.      OBJECTIVE:     Vitals:   BP (!) 107/45   Pulse 76   Temp (!) 97.2 °F (36.2 °C)   Resp 18   Ht 4' 10\" (1.473 m)   Wt 70.4 kg (155 lb 3.3 oz)   LMP  (LMP Unknown)   SpO2 (!) 78%   BMI 32.44 kg/m²     Temp (24hrs), Av.3 °F (36.3 °C), " "Min:96.7 °F (35.9 °C), Max:97.9 °F (36.6 °C)        Physical Exam:     General:  Alert, cooperative, and in no distress.  Lower extremity exam:  Cardiovascular status at baseline.  Neurological status at baseline.  S/p right partial first ray amputation. No calf tenderness noted.       Right lower extremity: Dressings remain clean, dry and intact. No ascending erythema, edema or strike through noted.       Additional Data:     Labs:    Results from last 7 days   Lab Units 07/30/24  0449   WBC Thousand/uL 37.50*   HEMOGLOBIN g/dL 6.4*   HEMATOCRIT % 20.6*   PLATELETS Thousands/uL 365   SEGS PCT % 82*   LYMPHO PCT % 3*   MONO PCT % 4   EOS PCT % 0     Results from last 7 days   Lab Units 07/30/24  0449 07/24/24  1939 07/24/24  1551   POTASSIUM mmol/L 4.7   < >  --    CHLORIDE mmol/L 108   < >  --    CO2 mmol/L 21   < >  --    CO2, I-STAT mmol/L  --   --  18*   BUN mg/dL 56*   < >  --    CREATININE mg/dL 1.80*   < >  --    CALCIUM mg/dL 8.0*   < >  --    ALK PHOS U/L 110*   < >  --    ALT U/L 126*   < >  --    AST U/L 411*   < >  --    GLUCOSE, ISTAT mg/dl  --   --  179*    < > = values in this interval not displayed.     Results from last 7 days   Lab Units 07/24/24  1504   INR  1.51*       * I Have Reviewed All Lab Data Listed Above.    Recent Cultures (last 7 days):               Imaging: I have personally reviewed pertinent films in PACS  EKG, Pathology, and Other Studies: I have personally reviewed pertinent reports.    ** Please Note: Portions of the record may have been created with voice recognition software. Occasional wrong word or \"sound a like\" substitutions may have occurred due to the inherent limitations of voice recognition software. Read the chart carefully and recognize, using context, where substitutions have occurred. **    "

## 2024-07-30 NOTE — ASSESSMENT & PLAN NOTE
Patient presenting with right big toe gangrene and associated cellulitis  Patient remains afebrile, nontoxic; however white count trended up substantially overnight, source control needed  Continue IV Ancef  Patient underwent right hallux amputation this afternoon, monitor and await further podiatry recommendations

## 2024-07-30 NOTE — PROGRESS NOTES
Progress Note - Anamaria Parnell 79 y.o. female MRN: [id number]    Unit/Bed#: [place] [address] -01 Encounter: [id number]    24 hr events  -R hallux amputation last night done by podiatry.   -in NSR on tele. No bradycardic events and no afib.   -hgb 6.4 this morning. Transfused one unit of prbc.     Assessment/Plan  79F with PMH of DM2, LBBB, severe PAD, HTN. hx of stroke s/p TCAR 2023, CKD3, new-afib this admission, who was admitted for right toe dry gangrene in the setting of chronic limb ischemia.  S/p left to right fem-fem bypass on 7/24- vascular planning for fem-pop later this week.   Hospital course has been complicated by new afib, intermittent symptomatic sinus bradycardia, leukocytosis, hospital delirium.     #dry gangrene, leukocytosis   #severe lower extremity PAD  R toe dry gangrene due to chronic PAD.  Has had L to R fem-fem bypass this admission, vascular planning for fem-pop bypass later this admission. On 7/29 patient underwent R toe amputation due to concern for lack of source control.    -currently on aspirin and ticagrelor.               -will need to discuss when starting AC for afib     #afib  #sinus bradycardia- sick sinus syndrome  #tachy-bradysyndrome  Patient with known first degree AV block and LBBB. She developed new afib and was started on metoprolol.  After metoprolol was started she had intermittent symptomatic sinus bradycardia to 30s (tele strips in media tab). Bradycardia likely from sick sinus syndrome and meds (metop and clonidine)-  Her metoprolol and clonidine have been held but if ongoing bradycardia will need a pacemaker.  She is currently in NSR however if she converts to afib rate control will be difficult given recently bradycardia.    -hold metoprolol  -hold clonidine.   -hold off on AC right now, will need to continue to dicuss.   -monitor for further bradycardia.  If she has ongoing symptomatic bradycardia will need pacing- likely temp pacer or temp perm given ongoing  "gangrene and leukocytosis.  Micra would be difficult given wound vacs in lower abdomen.     Subjective:   Patient feeling tired this morning. No chest pain, shortness of breath, palpitations.     Objective:     Vitals: Blood pressure 113/64, pulse 88, temperature 98 °F (36.7 °C), temperature source Axillary, resp. rate 20, height 4' 10\" (1.473 m), weight 70.4 kg (155 lb 3.3 oz), SpO2 97%.,Body mass index is 32.44 kg/m².      Intake/Output Summary (Last 24 hours) at 7/30/2024 1505  Last data filed at 7/30/2024 1441  Gross per 24 hour   Intake 1230 ml   Output 200 ml   Net 1030 ml       Physical Exam:   Constitutional:       Appearance: sitting up in chair.   HENT:      Head: Normocephalic and atraumatic.   Neck:      Vascular: No JVD   Cardiovascular:      Rate and Rhythm: RRR, no murmurs  Pulmonary:      Effort: CTA-b  Abdominal:      General: Abdomen is flat. Wound vac below abdominal pannus.   Musculoskeletal:     No edema. Right foot is dressed in guaze.   Skin:     General: Skin is warm.   Neurological:      Mental Status: she is disoriented   Psychiatric:         Behavior: Behavior normal.   "

## 2024-07-30 NOTE — ASSESSMENT & PLAN NOTE
PAD and aortic disease, R CLTI and R great toe dry gangrene. S/P Bilateral femoral endarterectomy with L to R fem-fem PTFE bypass and retrograde L JACI, L EIA stenting, bilateral groin VAC placement on 7/24   Intervention planned for Friday, 8/2

## 2024-07-30 NOTE — ASSESSMENT & PLAN NOTE
Lab Results   Component Value Date    HGBA1C 5.9 (H) 07/09/2024     Recent Labs     07/29/24  0719 07/29/24  1105 07/29/24  1615 07/29/24  1830   POCGLU 216* 191* 148* 147*       Home regimen: Tradjenta  Inpatient regimen: Sliding scale

## 2024-07-30 NOTE — PROGRESS NOTES
Progress Note - Infectious Disease   Anamaria Parnell 79 y.o. female MRN: [id number]  Unit/Bed#: [place] [address] -01 Encounter: [id number]      IMPRESSION & RECOMMENDATIONS:   Impression/Recommendations:  1.  Leukocytosis. Initial acute elevation in WBC count immediately postoperatively on 7/24 and slowly trended down.  Now back up, suspected secondary to source control issue in the setting of #2.  WBC count remains elevated immediately postoperatively.  Fortunately patient otherwise remains afebrile and clinically stable, nontoxic in appearance.       -Antibiotic plan as below  -Recheck CBC in AM  -Follow temperatures and hemodynamics closely     2.  Right hallux dry gangrene.  In the setting of PAD with chronic limb ischemia. Patient received 10-day course of cefazolin.  Foot was reevaluated by podiatry with some minor localized erythema at the site but no fluctuance, crepitus or purulence.  Patient is now status post right hallux amputation, right partial first ray resection on 7/29 with presumed source control achieved.  Operative cultures were obtained from site of infection and are pending.     -Start renally adjusted cefazolin/Flagyl for now  -Follow-up operative cultures and tailor antibiotics accordingly.  -Podiatry follow-up ongoing     3.  PAD.  Status post B CFA endarterectomies/profundaplasty with bovine pericardial patches, Left JACI/EIA POBA/Sockwave IVL/stenting x 2; Left to right femoral to femoral bypass on 7/24.     -Ongoing vascular surgery follow-up  -Plan for right femoral to below-knee popliteal artery bypass on 8/2 noted.     4.  DM 2, without long-term insulin use.  Recent hemoglobin A1c was 5.9.     5.  Acute kidney injury, on CKD 3.  Nephrology input noted.  Creatinine continues to fluctuate.     -Recheck BMP in AM  -Nephrology follow-up ongoing     Plan was discussed with podiatry who agrees with continuation of antibiotics.     Antibiotics:  Off antibiotic D5        Subjective:  Patient went  to the OR yesterday.  Pain is controlled.  No fevers, chills.  Tolerating oral intake.    Objective:  Vitals:  Temp:  [96.5 °F (35.8 °C)-97.9 °F (36.6 °C)] 96.8 °F (36 °C)  HR:  [] 96  Resp:  [12-26] 22  BP: (100-134)/(45-66) 100/57  SpO2:  [78 %-100 %] 98 %  Temp (24hrs), Av.3 °F (36.3 °C), Min:96.5 °F (35.8 °C), Max:97.9 °F (36.6 °C)  Current: Temperature: (!) 96.8 °F (36 °C)    Physical Exam:   General:  No acute distress, nontoxic, sitting comfortably  HEENT atraumatic normocephalic  Neck trachea midline  Psychiatric:  Awake and alert  Pulmonary:  Normal respiratory excursion without accessory muscle use  Abdomen: No visible distention, no rigidity or guarding  Extremities:  No edema  Foot dressing is intact  Skin:  No rashes  Neuro moves all extremities spontaneously    Lab Results:  I have personally reviewed pertinent labs.  Results from last 7 days   Lab Units 24  0449 24  0252 24  0529 24  0436 24  1939 24  1551 24  1341 24  1037   POTASSIUM mmol/L 4.7 4.0 3.4*   < > 5.3  --   --   --    CHLORIDE mmol/L 108 108 107   < > 114*  --   --   --    CO2 mmol/L  25 24   < > 17*  --   --   --    CO2, I-STAT mmol/L  --   --   --   --   --  18* 20* 20*   BUN mg/dL 56* 47* 41*   < > 25  --   --   --    CREATININE mg/dL 1.80* 1.55* 1.59*   < > 1.41*  --   --   --    EGFR ml/min/1.73sq m 26 32 31   < > 35  --   --   --    GLUCOSE, ISTAT mg/dl  --   --   --   --   --  179* 186* 183*   CALCIUM mg/dL 8.0* 8.3* 8.2*   < > 8.8  --   --   --    AST U/L 411* 229*  --   --  15  --   --   --    ALT U/L 126* 78*  --   --  3*  --   --   --    ALK PHOS U/L 110* 103  --   --  74  --   --   --     < > = values in this interval not displayed.     Results from last 7 days   Lab Units 24  0449 24  0252 24  0529   WBC Thousand/uL 37.50* 36.81* 28.95*   HEMOGLOBIN g/dL 6.4* 7.7* 7.9*   PLATELETS Thousands/uL 365 422* 360     Results from last 7 days   Lab Units  07/29/24  1805   GRAM STAIN RESULT  Rare Polys*  Rare Gram negative rods*       Imaging Studies:   I have personally reviewed pertinent imaging study reports and images in PACS.    EKG, Pathology, and Other Studies:   I have personally reviewed pertinent reports.       Operative report was personally reviewed.

## 2024-07-30 NOTE — ASSESSMENT & PLAN NOTE
Lab Results   Component Value Date    HGBA1C 5.9 (H) 07/09/2024     Recent Labs     07/29/24  1830 07/30/24  0726 07/30/24  1116 07/30/24  1616   POCGLU 147* 203* 200* 195*       Home regimen: Tradjenta  Inpatient regimen: Sliding scale

## 2024-07-30 NOTE — PLAN OF CARE
Problem: PHYSICAL THERAPY ADULT  Goal: Performs mobility at highest level of function for planned discharge setting.  See evaluation for individualized goals.  Description: Treatment/Interventions: Functional transfer training, LE strengthening/ROM, Therapeutic exercise, Patient/family training, Equipment eval/education, Bed mobility, Cognitive reorientation, Gait training, Compensatory technique education, Continued evaluation, Spoke to nursing, OT          See flowsheet documentation for full assessment, interventions and recommendations.  Note: Prognosis: Fair  Problem List: Decreased strength, Decreased range of motion, Decreased endurance, Impaired balance, Decreased mobility, Decreased cognition, Impaired judgement, Decreased safety awareness, Decreased skin integrity, Orthopedic restrictions, Pain  Assessment: Pt is a 79 y.o. female seen for PT re-evaluation. Pt is s/p R partial first ray amputation 7/29/24. PT consulted for mobility assessment and safe D/C planning with orders of NWB RLE. Please see refer to initial evaluation for detailed information on PLOF and home set up. Upon exam, pt is dependent x2 for supine to sit transition, max Ax2 for sit to supine transition, and max Ax2 w/ RW for STS. Attempted 1 STS w/ RW, pt unable to maintain WBS or achieve full standing despite maximal verbal cues. Pt required frequent redirection t/o session for attention to tasks. BP in supine 121/67. Pt reported onset of lightheadedness upon sitting EOB, BP 98/59. BP retaken after further sitting /50. Updated PT goals are as listed below. The patient's AM-PAC Basic Mobility Inpatient Short Form Raw Score is 6. A Raw score of less than or equal to 16 suggests the patient may benefit from discharge to post-acute rehabilitation services. Please also refer to the recommendation of the Physical Therapist for safe discharge planning. PT recommends Level II (moderate resource intensity) upon D/C. IPPT will continue  during admission at a frequency of 3-5x/wk with a focus on addressing current deficits and improving functional mobility. Co-eval was necessary to complete this PT re-eval for the pts best interest given the pt's medical acuity and complexity.  Barriers to Discharge: None, Decreased caregiver support (pt may need increased support post operatively)     Rehab Resource Intensity Level, PT: II (Moderate Resource Intensity) (STR)    See flowsheet documentation for full assessment.

## 2024-07-30 NOTE — PHYSICAL THERAPY NOTE
PT RE- EVALUATION    Pt. Name: Anamaria Parnell  Pt. Age: 79 y.o.  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/20/2021    Endometriosis     High cholesterol     History of left common carotid artery stent placement 10/27/2023    Hypertension     Kidney disease, chronic, stage III (moderate, EGFR 30-59 ml/min) (HCC)     Lumbar disc herniation     Median arcuate ligament syndrome (HCC) 11/05/2019    Neuropathy     Obesity (BMI 30-39.9) 12/04/2017    Obesity, morbid (HCC) 01/20/2021    Peripheral vascular disease ([facility])     Pneumonia     Shoulder injury     left    Spinal stenosis     Stroke ([facility]) 2015    Memory loss       Past Surgical History:   Procedure Laterality Date    ARTERIOGRAM Bilateral 7/24/2024    Procedure: ARTERIOGRAM;  Surgeon: Jose Crum DO;  Location: [facility];  Service: Vascular    CARDIAC CATHETERIZATION      CAROTID STENT Left 9/7/2023    Procedure: L TCAR;  Surgeon: Nicole Gallo MD;  [facility];  Service: Vascular    COLONOSCOPY      FL RETROGRADE PYELOGRAM  4/6/2020    FRACTURE SURGERY Right     ankle    IR CAROTID STENT  9/7/2023    IR LOWER EXTREMITY ANGIOGRAM  7/24/2024    OVARIAN CYST SURGERY      RI CYSTO BLADDER W/URETERAL CATHETERIZATION Bilateral 4/6/2020    Procedure: CYSTOSCOPY WITH RETROGRADE PYELOGRAM;  Surgeon: Robert Mitchell MD;  [facility];  Service: Urology    RI CYSTO W/INSERT URETERAL STENT Right 4/6/2020    Procedure: INSERTION STENT URETERAL;  Surgeon: Robert Mitchell MD;  [facility];  Service: Urology    RI CYSTO W/REMOVAL OF TUMORS SMALL N/A 4/6/2020    Procedure: TRANSURETHRAL RESECTION OF BLADDER TUMOR (TURBT);  Surgeon:  Robert Mitchell MD;  [facility];  Service: Urology    UT TEAEC W/WO PATCH GRAFT COMMON FEMORAL Bilateral 7/24/2024    Procedure: Bilateral femoral endarterectomies with patch angioplasty; Retrograde iliac intervention, shockwave, stent placement and angioplasty; femoral to femoral bypass with PTFE;  Surgeon: Jose Crum DO;  [facility];  Service: Vascular    ROTATOR CUFF REPAIR Left     TOE AMPUTATION Right 7/29/2024    Procedure: Rigth Hallux amputation, partial 1st ray resection;  Surgeon: Eddie Farooq DPM;  [facility];  Service: Podiatry    TONSILLECTOMY         Imaging Studies:  XR foot right 3+ views   Final Result by Kushal Arias MD (07/30 0703)      No acute osseous abnormality. Postoperative changes.               Workstation performed: [id number]         VAS ARTERIAL DUPLEX- LOWER LIMB BILATERAL   Final Result by Jose Crum DO (07/29 1332)      XR foot 3+ vw right   Final Result by Pravin Wooten MD (07/29 1210)      Increasing volume of air within the soft tissues of the first toe in this patient with provided history of gangrene. The previously described cortical destruction along the ventral aspect of the first distal phalanx is not well visualized on the current    examination, likely attributed to differences in patient positioning.            Workstation performed: [id number]         XR chest portable   Final Result by Amber Burnett MD (07/29 0851)      Moderate pulmonary edema with small effusions.            Workstation performed: [id number]         XR chest portable ICU   Final Result by Amber Burnett MD (07/24 2206)      ET tube 4 cm above the roberta.      Minimal atelectasis in the right midlung.            Workstation performed: [id number]         IR lower extremity angiogram   Final Result by ALAN TIDWELL (07/24 1814)      XR chest portable   Final Result by Seth Koch MD (07/23 1600)      Interval  development of pulmonary congestion. Small bilateral effusions suspected.            Workstation performed: [id number]         XR foot 3+ vw right   Final Result by Pravin Wooten MD (07/22 1240)      Cortical destruction along the ventral aspect of the first distal phalanx worrisome for acute osteomyelitis. Large air within the overlying soft tissues.      The study was marked in EPIC for immediate notification.         Workstation performed: [id number]              07/30/24 1324   PT Last Visit   PT Visit Date 07/30/24   Note Type   Note type Re-Evaluation   Pain Assessment   Pain Assessment Tool FLACC   Pain Location/Orientation Location: [facility]   Hospital Pain Intervention(s) Repositioned;Ambulation/increased activity;Heat applied;Emotional support;Rest   Pain Rating: FLACC (Rest) - Face 0   Pain Rating: FLACC (Rest) - Legs 0   Pain Rating: FLACC (Rest) - Activity 0   Pain Rating: FLACC (Rest) - Cry 0   Pain Rating: FLACC (Rest) - Consolability 0   Score: FLACC (Rest) 0   Pain Rating: FLACC (Activity) - Face 1   Pain Rating: FLACC (Activity) - Legs 0   Pain Rating: FLACC (Activity) - Activity 0   Pain Rating: FLACC (Activity) - Cry 1   Pain Rating: FLACC (Activity) - Consolability 1   Score: FLACC (Activity) 3   Restrictions/Precautions   Weight Bearing Precautions Per Order Yes   RLE Weight Bearing Per Order NWB   Other Precautions Cognitive;Chair Alarm;Bed Alarm;Multiple lines;Telemetry;Fall Risk;Pain  (wound vac)   Home Living   Type of Home House   Additional Comments refer to initial eval for detailed home set up   Prior Function   Level of [place] Independent with ADLs;Independent with functional mobility;Independent with IADLS   Comments refer to initial eval for detailed PLOF   General   Additional Pertinent History s/p right partial first ray amputation 7/29/24   Family/Caregiver Present Yes  (son)   Cognition   Overall Cognitive Status Impaired   Arousal/Participation Responsive    Attention Attends with cues to redirect   Orientation Level Oriented to person;Disoriented to place;Disoriented to time;Disoriented to situation   Memory Decreased short term memory;Decreased recall of recent events;Decreased recall of precautions   Following Commands Follows one step commands with increased time or repetition   Comments cooperative. acute worsening of mental status. will continue to monitor   Subjective   Subjective Pt agreeable to PT re-evaluation   RUE Assessment   RUE Assessment   (refer to OT)   LUE Assessment   LUE Assessment   (refer to OT)   RLE Assessment   RLE Assessment X  (limited hip flexion in supine; full AROM knee ext while seated EOB)   LLE Assessment   LLE Assessment X  (MMT not formally assessed; unable to achieve full knee ext in standing)   Bed Mobility   Supine to Sit 1  Dependent   Additional items Assist x 2;Increased time required;Verbal cues;LE management  (trunk mgmt)   Sit to Supine 2  Maximal assistance   Additional items Assist x 2;Increased time required;Verbal cues;LE management  (trunk mgmt)   Transfers   Sit to Stand 2  Maximal assistance   Additional items Assist x 2;Increased time required;Verbal cues  (w/ RW)   Stand to Sit 2  Maximal assistance   Additional items Assist x 2;Increased time required;Verbal cues  (w/ RW)   Additional Comments able to achieve ~25% of full standing; decreased compliance w/ NWB status   Ambulation/Elevation   Gait pattern Not tested   Ambulation/Elevation Additional Comments pt unable to achieve full standing and decreased compliance w/ NWB status   Balance   Static Sitting Poor   Dynamic Sitting Poor -   Static Standing Zero  (w/ RW)   Endurance Deficit   Endurance Deficit Yes   Endurance Deficit Description generalized weakness   Activity Tolerance   Activity Tolerance Patient limited by fatigue;Patient limited by pain   Medical Staff Made Aware STACIA Peterson   Nurse Made Aware yes, Jailene   Assessment   Prognosis Fair   Problem List  Decreased strength;Decreased range of motion;Decreased endurance;Impaired balance;Decreased mobility;Decreased cognition;Impaired judgement;Decreased safety awareness;Decreased skin integrity;Orthopedic restrictions;Pain   Assessment Pt is a 79 y.o. female seen for PT re-evaluation. Pt is s/p R partial first ray amputation 7/29/24. PT consulted for mobility assessment and safe D/C planning with orders of NWB RLE. Please see refer to initial evaluation for detailed information on PLOF and home set up. Upon exam, pt is dependent x2 for supine to sit transition, max Ax2 for sit to supine transition, and max Ax2 w/ RW for STS. Attempted 1 STS w/ RW, pt unable to maintain WBS or achieve full standing despite maximal verbal cues. Pt required frequent redirection t/o session for attention to tasks. BP in supine 121/67. Pt reported onset of lightheadedness upon sitting EOB, BP 98/59. BP retaken after further sitting /50. Updated PT goals are as listed below. The patient's AM-PAC Basic Mobility Inpatient Short Form Raw Score is 6. A Raw score of less than or equal to 16 suggests the patient may benefit from discharge to post-acute rehabilitation services. Please also refer to the recommendation of the Physical Therapist for safe discharge planning. PT recommends Level II (moderate resource intensity) upon D/C. IPPT will continue during admission at a frequency of 3-5x/wk with a focus on addressing current deficits and improving functional mobility. Co-eval was necessary to complete this PT re-eval for the pts best interest given the pt's medical acuity and complexity.   Goals   Patient Goals to go home   STG Expiration Date 08/09/24   Short Term Goal #1 1) Pt will perform bed mobility with mod Ax1 demonstrating appropriate technique 100% of the time in order to improve function. 2) Perform all transfers with mod Ax1 demonstrating safe and appropriate technique 100% of the time in order to improve ability to negotiate  safely in home environment. 3) Pt will increase standing tolerance to at least 1 minute in order to demonstrate readiness for ambulation activity. 4) Pt will tolerate amb at a distance of >50' w/ appropriate AD and mod Ax2 in order to demonstrate ability to complete household distances. 5) Pt will self propel WC  for a distance of >100' w/ S in order to demonstrate improvement/independence in functional mobility. 6) Pt will maintain % of the time in order to demonstrate understanding and carryover of all education. 7) Pt will increase activity tolerance to at least 30 minutes to demonstrate improved cardiovascular endurance and ability to participate in tasks as required. 8) Pt/caregiver education as appropriate and requested   PT Treatment Day 0   Plan   Treatment/Interventions Functional transfer training;LE strengthening/ROM;Therapeutic exercise;Endurance training;Cognitive reorientation;Patient/family training;Equipment eval/education;Bed mobility;Gait training;Compensatory technique education;Spoke to nursing;OT;Family;Continued evaluation   PT Frequency 3-5x/wk   Discharge Recommendation   Rehab Resource Intensity Level, PT II (Moderate Resource Intensity)  (STR)   AM-PAC Basic Mobility Inpatient   Turning in Flat Bed Without Bedrails 1   Lying on Back to Sitting on Edge of Flat Bed Without Bedrails 1   Moving Bed to Chair 1   Standing Up From Chair Using Arms 1   Walk in Room 1   Climb 3-5 Stairs With Railing 1   Basic Mobility Inpatient Raw Score 6   Turning Head Towards Sound 4   Follow Simple Instructions 2   Low Function Basic Mobility Raw Score  12   Low Function Basic Mobility Standardized Score  18.33   [facility] Highest Level Of Mobility   -HLM Goal 2: Bed activities/Dependent transfer   -HLM Achieved 3: Sit at edge of bed   End of Consult   Patient Position at End of Consult Supine;Bed/Chair alarm activated;All needs within reach   End of Consult Comments Pt in stable condition. All  needs met. All lines intact. Bed alarm activated   Ysabel Mares, SPT

## 2024-07-30 NOTE — PLAN OF CARE
Problem: OCCUPATIONAL THERAPY ADULT  Goal: Performs self-care activities at highest level of function for planned discharge setting.  See evaluation for individualized goals.  Outcome: Progressing  Note: Limitation: Decreased ADL status, Decreased UE strength, Decreased Safe judgement during ADL, Decreased endurance, Decreased self-care trans, Decreased high-level ADLs  Prognosis: Fair  Assessment: Anamaria Parnell is a 79 y.o. female seen for OT Re-evaluation s/p admit to [facility] on 7/19/2024 w/ Aortoiliac occlusive disease (HCC). Now s/p right partial first ray amputation, NWB RLE. Comorbidities affecting pt's functional performance at time of assessment include:  PVD, DM, CVA, CKD, HTN, spinal stenosis, L RTC repair, ankle fx, CAD--s/p stents . Pt with active OT orders and activity orders for Up and OOB as tolerated. Personal factors affecting pt at time of IE include:limited home support, difficulty performing ADLs, difficulty performing IADLs, difficulty performing transfers/mobility, WBS, fall risk , and functional decline .  Upon evaluation: Pt currently requires modA for UB ADLs, maxA for LB ADLs, total assist  for toileting, sit> supine maxAx2 for bed mobility, maxAx2 but unable to clear EOB for functional transfers, and N/A mobility 2* the following deficits impacting occupational performance:weakness, decreased strength , decreased balance, decreased activity tolerance, and orthopedic restrictions. VITALS during session: seated EOB 98/59> seated /50.  Pt to benefit from continued skilled OT tx while in the hospital to address deficits as defined above and maximize level of functional independence w ADL's and functional mobility. Occupational Performance areas to address include: eating, bathing/shower, toilet hygiene, dressing, health maintenance, and functional mobility. From OT standpoint, recommendation at time of d/c would be level 2 resources. OT to follow pt on caseload 3-5x/wk.     Rehab  Resource Intensity Level, OT: II (Moderate Resource Intensity)

## 2024-07-30 NOTE — ASSESSMENT & PLAN NOTE
Baseline 1.4-1.7  Initial RICARDO likely due to sepsis / pre-renal.  Appreciate nephrology recommendations, creatinine now within normal limits  Monitor and reconsult nephrology as needed  7/30-update: Creatinine went up slightly overnight, likely secondary to anemia.  Monitor on morning labs.      Recent Labs     07/28/24  0529 07/29/24  0252 07/30/24  0449   BUN 41* 47* 56*   CREATININE 1.59* 1.55* 1.80*   EGFR 31 32 26

## 2024-07-31 ENCOUNTER — APPOINTMENT (OUTPATIENT)
Dept: ULTRASOUND IMAGING | Facility: HOSPITAL | Age: 79
DRG: 278 | End: 2024-07-31
Payer: COMMERCIAL

## 2024-07-31 ENCOUNTER — APPOINTMENT (INPATIENT)
Dept: NON INVASIVE DIAGNOSTICS | Facility: HOSPITAL | Age: 79
DRG: 278 | End: 2024-07-31
Payer: COMMERCIAL

## 2024-07-31 ENCOUNTER — TELEPHONE (OUTPATIENT)
Dept: NEPHROLOGY | Facility: CLINIC | Age: 79
End: 2024-07-31

## 2024-07-31 PROBLEM — D62 ACUTE BLOOD LOSS ANEMIA: Status: ACTIVE | Noted: 2024-07-31

## 2024-07-31 PROBLEM — I96 GANGRENE (HCC): Status: RESOLVED | Noted: 2024-07-16 | Resolved: 2024-07-31

## 2024-07-31 LAB
ABO GROUP BLD BPU: NORMAL
ALBUMIN SERPL BCG-MCNC: 3.5 G/DL (ref 3.5–5)
ALP SERPL-CCNC: 105 U/L (ref 34–104)
ALT SERPL W P-5'-P-CCNC: 516 U/L (ref 7–52)
AMMONIA PLAS-SCNC: 48 UMOL/L (ref 18–72)
ANION GAP SERPL CALCULATED.3IONS-SCNC: 12 MMOL/L (ref 4–13)
ANISOCYTOSIS BLD QL SMEAR: PRESENT
AORTIC ROOT: 3 CM
APICAL FOUR CHAMBER EJECTION FRACTION: 48 %
ASCENDING AORTA: 3.4 CM
AST SERPL W P-5'-P-CCNC: 2280 U/L (ref 13–39)
ATRIAL RATE: 96 BPM
BACTERIA SPEC ANAEROBE CULT: NORMAL
BASOPHILS # BLD MANUAL: 0 THOUSAND/UL (ref 0–0.1)
BASOPHILS NFR MAR MANUAL: 0 % (ref 0–1)
BILIRUB SERPL-MCNC: 0.89 MG/DL (ref 0.2–1)
BPU ID: NORMAL
BSA FOR ECHO PROCEDURE: 1.66 M2
BUN SERPL-MCNC: 77 MG/DL (ref 5–25)
CALCIUM SERPL-MCNC: 8.2 MG/DL (ref 8.4–10.2)
CARDIAC TROPONIN I PNL SERPL HS: 3993 NG/L (ref 8–18)
CHLORIDE SERPL-SCNC: 108 MMOL/L (ref 96–108)
CO2 SERPL-SCNC: 24 MMOL/L (ref 21–32)
CREAT SERPL-MCNC: 2.37 MG/DL (ref 0.6–1.3)
CROSSMATCH: NORMAL
EOSINOPHIL # BLD MANUAL: 0 THOUSAND/UL (ref 0–0.4)
EOSINOPHIL NFR BLD MANUAL: 0 % (ref 0–6)
ERYTHROCYTE [DISTWIDTH] IN BLOOD BY AUTOMATED COUNT: 20.7 % (ref 11.6–15.1)
FRACTIONAL SHORTENING: 22 (ref 28–44)
GFR SERPL CREATININE-BSD FRML MDRD: 19 ML/MIN/1.73SQ M
GLUCOSE SERPL-MCNC: 247 MG/DL (ref 65–140)
GLUCOSE SERPL-MCNC: 253 MG/DL (ref 65–140)
GLUCOSE SERPL-MCNC: 260 MG/DL (ref 65–140)
GLUCOSE SERPL-MCNC: 283 MG/DL (ref 65–140)
GLUCOSE SERPL-MCNC: 285 MG/DL (ref 65–140)
HAV IGM SER QL: NORMAL
HBV CORE IGM SER QL: NORMAL
HBV SURFACE AG SER QL: NORMAL
HCT VFR BLD AUTO: 22.5 % (ref 34.8–46.1)
HCT VFR BLD AUTO: 32 % (ref 34.8–46.1)
HCV AB SER QL: NORMAL
HGB BLD-MCNC: 10.5 G/DL (ref 11.5–15.4)
HGB BLD-MCNC: 7.4 G/DL (ref 11.5–15.4)
INR PPP: 1.7 (ref 0.84–1.19)
INTERVENTRICULAR SEPTUM IN DIASTOLE (PARASTERNAL SHORT AXIS VIEW): 1.3 CM
INTERVENTRICULAR SEPTUM: 1.3 CM (ref 0.6–1.1)
LACTATE SERPL-SCNC: 1.9 MMOL/L (ref 0.5–2)
LACTATE SERPL-SCNC: 2.4 MMOL/L (ref 0.5–2)
LEFT ATRIUM SIZE: 3.5 CM
LEFT INTERNAL DIMENSION IN SYSTOLE: 3.6 CM (ref 2.1–4)
LEFT VENTRICULAR INTERNAL DIMENSION IN DIASTOLE: 4.6 CM (ref 3.5–6)
LEFT VENTRICULAR POSTERIOR WALL IN END DIASTOLE: 1.3 CM
LEFT VENTRICULAR STROKE VOLUME: 40 ML
LVSV (TEICH): 40 ML
LYMPHOCYTES # BLD AUTO: 0.95 THOUSAND/UL (ref 0.6–4.47)
LYMPHOCYTES # BLD AUTO: 3 % (ref 14–44)
MACROCYTES BLD QL AUTO: PRESENT
MAGNESIUM SERPL-MCNC: 2.4 MG/DL (ref 1.9–2.7)
MCH RBC QN AUTO: 32.6 PG (ref 26.8–34.3)
MCHC RBC AUTO-ENTMCNC: 32.9 G/DL (ref 31.4–37.4)
MCV RBC AUTO: 99 FL (ref 82–98)
METAMYELOCYTE ABSOLUTE CT: 0.95 THOUSAND/UL (ref 0–0.1)
METAMYELOCYTES NFR BLD MANUAL: 3 % (ref 0–1)
MONOCYTES # BLD AUTO: 1.58 THOUSAND/UL (ref 0–1.22)
MONOCYTES NFR BLD: 5 % (ref 4–12)
MYELOCYTE ABSOLUTE CT: 0.63 THOUSAND/UL (ref 0–0.1)
MYELOCYTES NFR BLD MANUAL: 2 % (ref 0–1)
NEUTROPHILS # BLD MANUAL: 27.46 THOUSAND/UL (ref 1.85–7.62)
NEUTS BAND NFR BLD MANUAL: 4 % (ref 0–8)
NEUTS SEG NFR BLD AUTO: 83 % (ref 43–75)
P AXIS: 106 DEGREES
PHOSPHATE SERPL-MCNC: 5 MG/DL (ref 2.3–4.1)
PLATELET # BLD AUTO: 318 THOUSANDS/UL (ref 149–390)
PLATELET BLD QL SMEAR: ADEQUATE
PMV BLD AUTO: 10.9 FL (ref 8.9–12.7)
POLYCHROMASIA BLD QL SMEAR: PRESENT
POTASSIUM SERPL-SCNC: 4.7 MMOL/L (ref 3.5–5.3)
PR INTERVAL: 176 MS
PROT SERPL-MCNC: 5.7 G/DL (ref 6.4–8.4)
PROTHROMBIN TIME: 20.1 SECONDS (ref 11.6–14.5)
QRS AXIS: -31 DEGREES
QRSD INTERVAL: 154 MS
QT INTERVAL: 416 MS
QTC INTERVAL: 525 MS
RA PRESSURE ESTIMATED: 15 MMHG
RBC # BLD AUTO: 2.27 MILLION/UL (ref 3.81–5.12)
RBC MORPH BLD: PRESENT
RV PSP: 47 MMHG
SL CV LV EF: 30
SL CV PED ECHO LEFT VENTRICLE DIASTOLIC VOLUME (MOD BIPLANE) 2D: 97 ML
SL CV PED ECHO LEFT VENTRICLE SYSTOLIC VOLUME (MOD BIPLANE) 2D: 56 ML
SODIUM SERPL-SCNC: 144 MMOL/L (ref 135–147)
T WAVE AXIS: 146 DEGREES
TR MAX PG: 32 MMHG
TR PEAK VELOCITY: 2.8 M/S
TRICUSPID ANNULAR PLANE SYSTOLIC EXCURSION: 1.8 CM
TRICUSPID VALVE PEAK REGURGITATION VELOCITY: 2.82 M/S
UNIT DISPENSE STATUS: NORMAL
UNIT PRODUCT CODE: NORMAL
UNIT PRODUCT VOLUME: 350 ML
UNIT RH: NORMAL
VENTRICULAR RATE: 96 BPM
WBC # BLD AUTO: 31.56 THOUSAND/UL (ref 4.31–10.16)

## 2024-07-31 PROCEDURE — 93308 TTE F-UP OR LMTD: CPT

## 2024-07-31 PROCEDURE — 97605 NEG PRS WND THER DME<=50SQCM: CPT | Performed by: NURSE PRACTITIONER

## 2024-07-31 PROCEDURE — 84484 ASSAY OF TROPONIN QUANT: CPT | Performed by: INTERNAL MEDICINE

## 2024-07-31 PROCEDURE — 93325 DOPPLER ECHO COLOR FLOW MAPG: CPT

## 2024-07-31 PROCEDURE — 83735 ASSAY OF MAGNESIUM: CPT | Performed by: INTERNAL MEDICINE

## 2024-07-31 PROCEDURE — 99024 POSTOP FOLLOW-UP VISIT: CPT | Performed by: NURSE PRACTITIONER

## 2024-07-31 PROCEDURE — 85007 BL SMEAR W/DIFF WBC COUNT: CPT | Performed by: INTERNAL MEDICINE

## 2024-07-31 PROCEDURE — 80053 COMPREHEN METABOLIC PANEL: CPT | Performed by: INTERNAL MEDICINE

## 2024-07-31 PROCEDURE — 82140 ASSAY OF AMMONIA: CPT | Performed by: PHYSICIAN ASSISTANT

## 2024-07-31 PROCEDURE — 83605 ASSAY OF LACTIC ACID: CPT | Performed by: INTERNAL MEDICINE

## 2024-07-31 PROCEDURE — 82948 REAGENT STRIP/BLOOD GLUCOSE: CPT

## 2024-07-31 PROCEDURE — 99232 SBSQ HOSP IP/OBS MODERATE 35: CPT

## 2024-07-31 PROCEDURE — 85018 HEMOGLOBIN: CPT | Performed by: INTERNAL MEDICINE

## 2024-07-31 PROCEDURE — 93010 ELECTROCARDIOGRAM REPORT: CPT | Performed by: INTERNAL MEDICINE

## 2024-07-31 PROCEDURE — 85610 PROTHROMBIN TIME: CPT | Performed by: STUDENT IN AN ORGANIZED HEALTH CARE EDUCATION/TRAINING PROGRAM

## 2024-07-31 PROCEDURE — 99223 1ST HOSP IP/OBS HIGH 75: CPT | Performed by: INTERNAL MEDICINE

## 2024-07-31 PROCEDURE — 76705 ECHO EXAM OF ABDOMEN: CPT

## 2024-07-31 PROCEDURE — 93321 DOPPLER ECHO F-UP/LMTD STD: CPT

## 2024-07-31 PROCEDURE — 99223 1ST HOSP IP/OBS HIGH 75: CPT

## 2024-07-31 PROCEDURE — 97530 THERAPEUTIC ACTIVITIES: CPT

## 2024-07-31 PROCEDURE — 80074 ACUTE HEPATITIS PANEL: CPT | Performed by: STUDENT IN AN ORGANIZED HEALTH CARE EDUCATION/TRAINING PROGRAM

## 2024-07-31 PROCEDURE — 85014 HEMATOCRIT: CPT | Performed by: INTERNAL MEDICINE

## 2024-07-31 PROCEDURE — 83605 ASSAY OF LACTIC ACID: CPT | Performed by: PHYSICIAN ASSISTANT

## 2024-07-31 PROCEDURE — 99024 POSTOP FOLLOW-UP VISIT: CPT | Performed by: PODIATRIST

## 2024-07-31 PROCEDURE — 99233 SBSQ HOSP IP/OBS HIGH 50: CPT | Performed by: INTERNAL MEDICINE

## 2024-07-31 PROCEDURE — 84100 ASSAY OF PHOSPHORUS: CPT | Performed by: INTERNAL MEDICINE

## 2024-07-31 PROCEDURE — 99232 SBSQ HOSP IP/OBS MODERATE 35: CPT | Performed by: INTERNAL MEDICINE

## 2024-07-31 PROCEDURE — 85027 COMPLETE CBC AUTOMATED: CPT | Performed by: INTERNAL MEDICINE

## 2024-07-31 PROCEDURE — P9016 RBC LEUKOCYTES REDUCED: HCPCS

## 2024-07-31 RX ORDER — ONDANSETRON 2 MG/ML
4 INJECTION INTRAMUSCULAR; INTRAVENOUS ONCE AS NEEDED
OUTPATIENT
Start: 2024-07-31

## 2024-07-31 RX ORDER — HYDROMORPHONE HCL/PF 1 MG/ML
0.5 SYRINGE (ML) INJECTION
OUTPATIENT
Start: 2024-07-31

## 2024-07-31 RX ORDER — FENTANYL CITRATE/PF 50 MCG/ML
25 SYRINGE (ML) INJECTION
OUTPATIENT
Start: 2024-07-31

## 2024-07-31 RX ORDER — FUROSEMIDE 20 MG/1
20 TABLET ORAL EVERY 6 HOURS
Status: DISPENSED | OUTPATIENT
Start: 2024-07-31 | End: 2024-07-31

## 2024-07-31 RX ORDER — SODIUM CHLORIDE, SODIUM LACTATE, POTASSIUM CHLORIDE, CALCIUM CHLORIDE 600; 310; 30; 20 MG/100ML; MG/100ML; MG/100ML; MG/100ML
125 INJECTION, SOLUTION INTRAVENOUS CONTINUOUS
OUTPATIENT
Start: 2024-07-31

## 2024-07-31 RX ADMIN — ASPIRIN 81 MG CHEWABLE TABLET 81 MG: 81 TABLET CHEWABLE at 08:43

## 2024-07-31 RX ADMIN — TICAGRELOR 90 MG: 90 TABLET ORAL at 22:22

## 2024-07-31 RX ADMIN — SODIUM BICARBONATE 650 MG TABLET 650 MG: at 16:52

## 2024-07-31 RX ADMIN — CEFEPIME 1000 MG: 1 INJECTION, POWDER, FOR SOLUTION INTRAMUSCULAR; INTRAVENOUS at 01:55

## 2024-07-31 RX ADMIN — ESCITALOPRAM OXALATE 20 MG: 20 TABLET ORAL at 08:43

## 2024-07-31 RX ADMIN — LACTULOSE 20 G: 20 SOLUTION ORAL at 08:43

## 2024-07-31 RX ADMIN — INSULIN DETEMIR 10 UNITS: 100 INJECTION, SOLUTION SUBCUTANEOUS at 11:01

## 2024-07-31 RX ADMIN — METRONIDAZOLE 500 MG: 500 TABLET ORAL at 14:50

## 2024-07-31 RX ADMIN — ALLOPURINOL 100 MG: 100 TABLET ORAL at 08:43

## 2024-07-31 RX ADMIN — FUROSEMIDE 20 MG: 20 TABLET ORAL at 15:13

## 2024-07-31 RX ADMIN — METRONIDAZOLE 500 MG: 500 TABLET ORAL at 05:38

## 2024-07-31 RX ADMIN — INSULIN LISPRO 2 UNITS: 100 INJECTION, SOLUTION INTRAVENOUS; SUBCUTANEOUS at 12:42

## 2024-07-31 RX ADMIN — TICAGRELOR 90 MG: 90 TABLET ORAL at 08:43

## 2024-07-31 RX ADMIN — METRONIDAZOLE 500 MG: 500 TABLET ORAL at 22:22

## 2024-07-31 RX ADMIN — INSULIN LISPRO 2 UNITS: 100 INJECTION, SOLUTION INTRAVENOUS; SUBCUTANEOUS at 16:52

## 2024-07-31 RX ADMIN — SODIUM BICARBONATE 650 MG TABLET 650 MG: at 08:43

## 2024-07-31 RX ADMIN — ALBUMIN (HUMAN) 25 G: 12.5 INJECTION, SOLUTION INTRAVENOUS at 00:02

## 2024-07-31 RX ADMIN — INSULIN LISPRO 3 UNITS: 100 INJECTION, SOLUTION INTRAVENOUS; SUBCUTANEOUS at 08:42

## 2024-07-31 NOTE — PLAN OF CARE
Problem: OCCUPATIONAL THERAPY ADULT  Goal: Performs self-care activities at highest level of function for planned discharge setting.  See evaluation for individualized goals.  Outcome: Progressing  Note: Limitation: Decreased ADL status, Decreased UE strength, Decreased Safe judgement during ADL, Decreased endurance, Decreased self-care trans, Decreased high-level ADLs  Prognosis: Fair  Assessment: Pt seen for skilled OT tx this date. Tx focused on improving strength, activity tolerance and balance, safety awareness, cognition to increase independence with self care tasks. Pt tolerated session fair. Pt was limited by confusion pain, weakness and WBS. Pt performed LB dressing / bathing max for socks, bed mobility supine> sit modAx1 , transfers modAx2, SB transfers depx2. Pt demonstrated fair - sitting balance during functional tasks.Pt required moderate verbal cuing during session to safely complete tasks. Pt completed 1x10 of various BUE exercises to increase strength. Performed while seated. Tolerated fair. Current OT DC recommendations for pt is level2 resources.     Rehab Resource Intensity Level, OT: II (Moderate Resource Intensity)

## 2024-07-31 NOTE — PROGRESS NOTES
[facility]  Progress Note  Name: Anamaria Parnell I  MRN: [id number]  Unit/Bed#: [place] [address] -01 I Date of Admission: 7/19/2024   Date of Service: 7/30/2024 I Hospital Day: 11    Assessment & Plan   * Aortoiliac occlusive disease (HCC)  Assessment & Plan  PAD and aortic disease, R CLTI and R great toe dry gangrene. S/P Bilateral femoral endarterectomy with L to R fem-fem PTFE bypass and retrograde L JACI, L EIA stenting, bilateral groin VAC placement on 7/24   Intervention planned for Friday, 8/2    Acute liver failure without hepatic coma  Assessment & Plan  Patient noted to have increase in LFTs prior to tonight's labs, likely multifactorial and secondary to acute illness and cephalosporins  As part of workup for acute encephalopathy, LFTs tonight noted to be highly elevated  Ratio of AST/ALT concerning for shock liver given anemia this morning and concurrent elevated troponin  Has been transfused, monitor LFTs on a.m. labs  Ammonia pending; consult to gastroenterology    Elevated troponin  Assessment & Plan  Patient noted to have elevated troponin this afternoon as part of encephalopathy workup; currently 4677  Tachycardic overnight and anemic, likely elevated in demand ischemia  Have reversed anemia with blood transfusion hemoglobin currently 8.2  Monitor on telemetry; obtain stat EKG  Have discussed with cardiology, reverse likely contributing factors and reassess      Encephalopathy  Assessment & Plan  Patient noted to be acutely confused this morning  Likely multifactorial table to late afternoon early evening anesthesia and lack of sleep overnight    However, patient with history of stroke and given recent surgical procedure, some concern for possible CVA versus TIA  Repeat labs pending; consult to geriatrics  Stat CT head obtained, no acute events noted  Continue DAPT therapy for now  Trop highly elevated, have discussed with cardiology, correct reversible causes of possible  "demand ischemia   EKG with no significant changes since yesterday-no STEMI  Reassess in a.m., discussed with geriatrics    Workup of altered mental status:  Alcohol - neg etoh intake  Epilepsy/electrolytes: no history of seizure disorder; labs stable  Insulin - blood glucose WNL  Opiates and oxygen: SpO2-96% on room air; opiates used sparingly  Uremia - creatinine elevated, but stable; will obtain ammonia levels given profound increase in LFTs  Trauma/temperature - atraumatic; afebrile; not hypothermic  Infection -source control obtained surgically yesterday, on antibiotics now  Poisons/psychogenic - no hx of acute poisoning; medications reviewed; unclear if psychogenic  Stroke/seizures: CT head negative for acute bleed or ischemic event; no hx of seizure activity      Chronic HFrEF (heart failure with reduced ejection fraction) ([facility])  Assessment & Plan  Wt Readings from Last 3 Encounters:   07/28/24 70.4 kg (155 lb 3.3 oz)   07/19/24 67.2 kg (148 lb 2.4 oz)   07/15/24 65.3 kg (144 lb)     Followed by cardiology, continue diuretics  AV node blockers on hold secondary to bradycardia  Remains 96% SpO2 on room air, not in acute exacerbation        Paroxysmal atrial fibrillation (HCC)  Assessment & Plan  Converted spontaneously before she was placed on telemetry. ECG reviewed with cardiology.  Per cardiology: \"Given her anemia, DAPT for recent surgery, recent surgery and low A-fib burden, will hold off starting anticoagulation for now.\"     7/29-update: Patient now with tachybradycardia syndrome; cardiology recommending transfer to [place] sometime following Friday's vascular surgery procedure for possible implantation of pacemaker.    Coronary artery disease of native artery of native heart with stable angina pectoris (HCC)  Assessment & Plan  CAD with history of PCI.  No chest pain.  On DAPT  Seen in consultation with cardiology for preoperative risk assessment    Cellulitis of toe of right foot  Assessment & " Plan  Patient presenting with right big toe gangrene and associated cellulitis  Patient remains afebrile, nontoxic; however white count trended up substantially overnight, source control needed  Continue IV Ancef  Patient underwent right hallux amputation on 7/29, followed by podiatry    Primary hypertension  Assessment & Plan  Controlled  Continue amlodipine, clonidine, metoprolol    Type 2 diabetes mellitus, without long-term current use of insulin ([facility])  Assessment & Plan  Lab Results   Component Value Date    HGBA1C 5.9 (H) 07/09/2024     Recent Labs     07/29/24  1830 07/30/24  0726 07/30/24  1116 07/30/24  1616   POCGLU 147* 203* 200* 195*       Home regimen: Tradjenta  Inpatient regimen: Sliding scale      Depression, recurrent ([facility])  Assessment & Plan  Continue Lexapro    Acute renal failure (ARF) ([facility])  Assessment & Plan  Baseline 1.4-1.7  Initial RICARDO likely due to sepsis / pre-renal.  Appreciate nephrology recommendations, creatinine now within normal limits  Monitor and reconsult nephrology as needed  7/30-update: Creatinine went up slightly overnight, likely secondary to anemia.  Monitor on morning labs.      Recent Labs     07/28/24  0529 07/29/24  0252 07/30/24  0449   BUN 41* 47* 56*   CREATININE 1.59* 1.55* 1.80*   EGFR 31 32 26                 CVA (cerebral vascular accident) ([facility])  Assessment & Plan  History of CVA. MRI 7/2023 showing: Large area of acute to subacute ischemia left PCA distribution occipital lobe. Small foci of acute to subacute ischemia in the left frontal and left parietal/temporal lobes MCA distribution.  Continue Aspirin and statin    Basilar artery stenosis  Assessment & Plan  history of carotid surgery.    Follows with Dr. Gallo               VTE Pharmacologic Prophylaxis: VTE Score: 3 Moderate Risk (Score 3-4) - Pharmacological DVT Prophylaxis Ordered: heparin.    Mobility:   Basic Mobility Inpatient Raw Score: 6  -[facility] Goal: 2: Bed activities/Dependent transfer  [facility]  "Achieved: 3: Sit at edge of bed  JH-HLM Goal NOT achieved. Continue with multidisciplinary rounding and encourage appropriate mobility to improve upon JH-HLM goals.    Patient Centered Rounds: I performed bedside rounds with nursing staff today.   Discussions with Specialists or Other Care Team Provider: Podiatry, cardiology    Education and Discussions with Family / Patient: Attempted to update  (son) via phone. Left voicemail.     Total Time Spent on Date of Encounter in care of patient: 45 mins. This time was spent on one or more of the following: performing physical exam; counseling and coordination of care; obtaining or reviewing history; documenting in the medical record; reviewing/ordering tests, medications or procedures; communicating with other healthcare professionals and discussing with patient's family/caregivers.    Current Length of Stay: 11 day(s)  Current Patient Status: Inpatient   Certification Statement: The patient will continue to require additional inpatient hospital stay due to treatment of aortoiliac occlusive disease  Discharge Plan: Anticipate discharge in >72 hrs to discharge location to be determined pending rehab evaluations.    Code Status: Level 3 - DNAR and DNI    Subjective:   Patient seen and examined bedside, no acute distress but did appear confused and was saying \"I am late, I got a get dressed and get down there\".  Exhibited behaviors concerning for sundowning which would not be unusual for elderly female patient who underwent late surgery the day prior.  She was easily redirectable this morning and able to answer most examiner questions appropriately after reorienting her to place and time.  Also, noted to have acute anemia on morning labs, was transfused 1 unit PRBCs with subsequent improvement up to 8.3.  Unfortunately patient became progressively more confused throughout the day.  Given history of CVA and recent troponin elevation, workup for metabolic " encephalopathy versus CVA/TIA was initiated.  CT head obtained, no new findings concerning for acute stroke or intracranial bleed.  Troponin obtained as well, patient with no symptoms of chest pain, but troponin was elevated substantially over previous labs.  EKG was unchanged and this likely represents demand ischemia in the setting of acute anemia following toe amputation the day prior.  Case was discussed with cardiology, appreciate recommendations who agree and recommend reversing possible causes of ischemia to include anemia.  Likewise, CMP revealed significant transaminitis; distribution of LFTs concerning for shock liver which would correspond with elevated troponin and history of acute anemia this morning.  Statin held; continue antibiotic therapy as risk of holding cefazolin greater than benefit at this time.  Consult placed to gastroenterology and will repeat LFTs on a.m. labs    Will hold heparin DVT prophylaxis for now; low threshold to restart if hemoglobin and other labs improved in AM.  Continue DAPT therapy for now, will discuss with surgical team regarding holding DAPT therapy if hemoglobin continues to decline.    Creatinine up trended slightly, once again, likely secondary to prerenal causes and postsurgical anemia.  Anticipate improvement on morning labs, will continue low-dose Demadex for now given consideration of fluid load during transfusion, consider discontinuing tomorrow based on labs.    For completeness sake, ammonia obtained but doubt it will prove consequential; lactic acid pending.    To remain on stepdown level 2 for now; low threshold to obtain critical care consult, however patient is Level 3 DNR/DNI.  That being said, vitals remain remarkably stable considering she is now at least 5 organ systems down.  Have attempted to update son via telephone, left message.  Night team updated; consult to gerontology also placed and awaiting further recommendations.      Objective:     Vitals:    Temp (24hrs), Av.4 °F (36.3 °C), Min:96.5 °F (35.8 °C), Max:98.2 °F (36.8 °C)    Temp:  [96.5 °F (35.8 °C)-98.2 °F (36.8 °C)] 98.2 °F (36.8 °C)  HR:  [] 92  Resp:  [18-26] 18  BP: ()/(45-66) 96/54  SpO2:  [78 %-99 %] 96 %  Body mass index is 32.44 kg/m².     Input and Output Summary (last 24 hours):     Intake/Output Summary (Last 24 hours) at 2024  Last data filed at 2024 1441  Gross per 24 hour   Intake 350 ml   Output 200 ml   Net 150 ml       Physical Exam:   Physical Exam  Vitals and nursing note reviewed.   Constitutional:       General: She is not in acute distress.     Appearance: She is well-developed.   HENT:      Head: Normocephalic and atraumatic.   Eyes:      Conjunctiva/sclera: Conjunctivae normal.   Cardiovascular:      Rate and Rhythm: Normal rate. Rhythm irregular.   Pulmonary:      Effort: Pulmonary effort is normal. No respiratory distress.   Abdominal:      Palpations: Abdomen is soft.      Tenderness: There is no abdominal tenderness.   Musculoskeletal:         General: Swelling, deformity and signs of injury present.      Cervical back: Neck supple.      Right lower leg: Edema present.   Skin:     General: Skin is warm and dry.   Neurological:      Mental Status: She is alert.   Psychiatric:         Mood and Affect: Mood normal.            Additional Data:     Labs:  Results from last 7 days   Lab Units 24  1808 24  0449 24  0252   WBC Thousand/uL 42.69* 37.50* 36.81*   HEMOGLOBIN g/dL 8.3* 6.4* 7.7*   HEMATOCRIT % 27.2* 20.6* 23.4*   PLATELETS Thousands/uL 341 365 422*   BANDS PCT %  --   --  3   SEGS PCT %  --  82*  --    LYMPHO PCT %  --  3* 8*   MONO PCT %  --  4 7   EOS PCT %  --  0 0     Results from last 7 days   Lab Units 24  1808   SODIUM mmol/L 142   POTASSIUM mmol/L 5.2   CHLORIDE mmol/L 109*   CO2 mmol/L 19*   BUN mg/dL 70*   CREATININE mg/dL 2.17*   ANION GAP mmol/L 14*   CALCIUM mg/dL 8.2*   ALBUMIN g/dL 3.0*   TOTAL  BILIRUBIN mg/dL 0.75   ALK PHOS U/L 111*   ALT U/L 724*   AST U/L 3,936*   GLUCOSE RANDOM mg/dL 221*     Results from last 7 days   Lab Units 07/24/24  1504   INR  1.51*     Results from last 7 days   Lab Units 07/30/24  1616 07/30/24  1116 07/30/24  0726 07/29/24  1830 07/29/24  1615 07/29/24  1105 07/29/24  0719 07/29/24  0156 07/28/24  2115 07/28/24  1620 07/28/24  1130 07/28/24  0740   POC GLUCOSE mg/dl 195* 200* 203* 147* 148* 191* 216* 190* 187* 187* 148* 156*         Results from last 7 days   Lab Units 07/24/24  1939   LACTIC ACID mmol/L 1.5       Lines/Drains:  Invasive Devices       Peripheral Intravenous Line  Duration             Peripheral IV 07/27/24 Right;Ventral (anterior) Forearm 3 days    Peripheral IV 07/30/24 Left;Proximal;Ventral (anterior) Antecubital <1 day                      Telemetry:  Telemetry Orders (From admission, onward)               24 Hour Telemetry Monitoring  Continuous x 24 Hours (Telem)        Question:  Reason for 24 Hour Telemetry  Answer:  Arrhythmias requiring acute medical intervention / PPM or ICD malfunction                                  Imaging: Reviewed radiology reports from this admission including: CT head    Recent Cultures (last 7 days):   Results from last 7 days   Lab Units 07/29/24  1805   GRAM STAIN RESULT  Rare Polys*  Rare Gram negative rods*       Last 24 Hours Medication List:   Current Facility-Administered Medications   Medication Dose Route Frequency Provider Last Rate    allopurinol  100 mg Oral Daily Costa Omer DPM      aspirin  81 mg Oral Daily Costa Omer DPM      cefazolin  1,000 mg Intravenous Q12H Costa Omer DPM 1,000 mg (07/30/24 1357)    escitalopram  20 mg Oral Daily Costa Omer DPM      heparin (porcine)  5,000 Units Subcutaneous Q8H [facility] Costa Omer DPM      HYDROmorphone  0.2 mg Intravenous Q6H PRN Costa Omer DPM      insulin lispro  1-5 Units Subcutaneous TID  Costa  Timothy Omer, KARMA      lidocaine  1 patch Topical Daily Costa Omer DPM      LORazepam  0.5 mg Oral Q8H PRN Costa Omer DPM      metroNIDAZOLE  500 mg Oral Q8H JAVIER Costa Omer DPM      ondansetron  4 mg Intravenous Q6H PRN Costa Omer, APRILM      oxyCODONE  5 mg Oral Q6H PRN Costa Omer DPM      sodium bicarbonate  650 mg Oral BID after meals Costa Omer DPM      ticagrelor  90 mg Oral Q12H JAVIER Costa Omer, KARMA      torsemide  10 mg Oral Daily APRIL BarrosoM      traZODone  50 mg Oral HS Costa Omer DPM          Today, Patient Was Seen By: Cullen Reinoso MD    **Please Note: This note may have been constructed using a voice recognition system.**

## 2024-07-31 NOTE — ASSESSMENT & PLAN NOTE
Patient experienced profound elevation in LFTs on evening of 7/30-likely shock liver in the setting of acute anemia  Has been transfused, LFTs this morning much improved  Ammonia now within normal limits; consult to gastroenterology

## 2024-07-31 NOTE — ASSESSMENT & PLAN NOTE
Baseline 1.4-1.7  Initial RICARDO likely due to sepsis / pre-renal.  Had resolved, but unfortunately patient with acute blood loss anemia yesterday and creatinine spiked  Monitor and reconsult nephrology as needed  Monitor on morning labs now the patient has been transfused; anticipate overall improvement      Recent Labs     07/30/24  0449 07/30/24  1808 07/31/24  0502   BUN 56* 70* 77*   CREATININE 1.80* 2.17* 2.37*   EGFR 26 21 19

## 2024-07-31 NOTE — ASSESSMENT & PLAN NOTE
Patient noted to have anemia on yesterday morning's labs, likely secondary to recent surgical procedures  Patient with deconditioning and other chronic illnesses; was transfused 1 unit PRBCs  Given other comorbidities secondary to acute blood loss anemia, will attempt to keep hemoglobin levels above 8  Currently 7.4; will transfuse 1 unit PRBCs today  Concurrently, concerned about volume status; this confirmed by chest x-ray on 7/29  Continue gentle oral diuretics, consider once along IV Lasix following today's transfusion

## 2024-07-31 NOTE — PROGRESS NOTES
[facility]  Progress Note  Name: Anamaria Parnell I  MRN: [id number]  Unit/Bed#: [place] [address] -01 I Date of Admission: 7/19/2024   Date of Service: 7/31/2024 I Hospital Day: 12    Assessment & Plan     Aortoliliac occlusive disease:    80 yo female ex-smoker w/ a hx of obesity, CKD IV, DM II w/ peripheral neuropathy, HLD, HTN, CAD, L MCA/PCA CVA S/P L TCAR 9/7/23 ([place]), FÉLIX, mesenteric artery stenosis and aortoiliac occlusive disease w/ PAD and chronic R hallux dry gangrene presented to [facility] on 7/16/24 w/ worsening R great toe pain and R hallux gangrene. Pt seen in consult by nephrology for RICARDO on CKD. Pt was transferred to [facility] on 7/19 w/ plans for R femoral endarterectomy w/ retrograde stenting and RLE bypass. Pt seen in consult by podiatry for R hallux dry gangrene. Xray R foot (+) OM. Podiatry plans local wound care w/ re-eval for R hallux amputation after revascularization.     Vascular surgery consulted for revascularization options for known AIOD/PAD w/ CLTI and R hallux dry gangrene. Pt follows w/ vascular surgery for PAD and chronic limb ischemia; last seen in the office on 6/4/24. Imaging shows extensive AIOD and peripheral arterial disease with occlusion of the R iliac system, nearly occlusive R CFA, and occlusion of popliteal artery with recon of the below knee popliteal and tibials. R LISA: 0.11/-/-.      Pt is S/P B/L CFAE, L JACI/EIA balloon angio and shockwave lithotripsy w/ insertion of a L EIA drug-coated stent and L JACI VBX stent, L to R PTFE fem-fem bypass, and B/L groin vac placement on 7/24. Pt received 3U PRBC and 1U FFP intra-op. Plan to return to OR for R femoral to BK-pop bypass; tentatively scheduled for 8/2/24.      Leukocytosis continued to increase w/ worsening R toe gangrene. Pt underwent R hallux amputation w/ partial 1st ray resection on 7/29 for source control. Pt was seen in consult by ID and continues on IV cefepime and flagyl. 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 Plan for possible PPM after resolution of infection and revascularization is complete. Pt received 1U PRBC on 7/30 for Hgb 6.6. She developed encephalopathy w/ worsening LFTs and lactic acidosis attributed to shock liver vs fulminant hepatitis. Consults to GI and geriatrics pending. Troponin obtained as part of encephalopathy workup was initially 4,677 w/ EKG unchanged from prior, per cardiology.     Diagnostics:  -7/30/24 CT head: Chronic left PCA territory infarct. No evidence of acute infarct, intracranial hemorrhage or mass.  -7/29/24 LEAD: Right: Evidence of patent endarterectomy with diffuse disease noted throughout the femoral-popliteal arteries without significant focal stenosis. LISA: 0.53/48/22. Left: Evidence of patent endarterectomy with diffuse disease noted throughout the femoral-popliteal arteries without significant focal stenosis. L LISA: 0.57/10/43  -7/29/24 Xray R foot: Increasing volume of air within the soft tissues of the first toe in this patient with provided history of gangrene. The previously described cortical destruction along the ventral aspect of the first distal phalanx is not well visualized on the current examination, likely attributed to differences in patient positioning.  -7/22/24 Xray R foot: Cortical destruction along the ventral aspect of the first distal phalanx worrisome for acute osteomyelitis. Large air within the overlying soft tissues.   -7/21/24 Echo: Left ventricular cavity size is normal. Wall thickness is severely increased. The left ventricular ejection fraction is 41% by biplane measurement. Systolic function is mildly reduced. Global longitudinal strain is reduced at -10% with moderate to severe strain reduction in the ED mid to basal anteroseptal septal and inferior walls..  Unable to grade diastolic dysfunction given the severe degree of mitral annular calcification.   -7/18/24 LE vein mapping: Right: The great saphenous vein is patent  from the groin to the ankle. The intraluminal diameter measurements range from 2.2mm to 6.4mm throughout. Left: The great saphenous vein is patent from the groin to the ankle. The intraluminal diameter measurements range from 1.3mm to 6.8mm throughout.  -7/18/24 CTA abd w/ runoff: Visualized descending thoracic and suprarenal abdominal aorta demonstrate marked soft plaque with multifocal regions of plaque ulceration. A point of relative stenosis within the infrarenal abdominal aorta measures up to 7 mm in diameter. Bilateral multifocal severe stenosis within the external iliac and common femoral arteries. Bilateral long segment SFA occlusion extending from the ostia to the level of the P1 popliteal artery. Three-vessel runoff on the right, the posterior tibial artery is dominant. Two-vessel runoff on the left, the peroneal artery is dominant. Segmental visualization of the posterior tibial artery. Interval increase in size of an exophytic lesion arising medially from the stomach, again consistent with gastrointestinal stromal tumor. New dilation of the pancreatic duct within the pancreatic head, no obvious pancreatic/ampulla lesion identified.  -7/16/24 LEAD: Right: Severe diffuse disease noted throughout the femoral-popliteal arteries with high grade stenosis vs occlusion of the proximal-distal superficial femoral artery with reconstitution to the popliteal artery. Evidence suggestive of TIb/Peroneal occlusive disease. There is a high grade stenosis vs occlusion of the distal anterior tibial artery. R LISA: 0.11/-/-. Left: Severe diffuse disease noted throughout the femoral-popliteal arteries with high grade stenosis vs occlusion of the proximal-mid superficial femoral artery with reconstitution in the distal SFA. Evidence suggestive of TIb/Peroneal  "occlusive disease. There is high grade stenosis vs. occlusion of the entire posterior tibial artery. L LISA: 0.31/20/19.     Plan:   -Advanced calcific atherosclerotic AIOD/PAD w/ CTLI and R hallux dry gangrene (+) OM   -S/P B/L CFAE, L JACI/EIA balloon angio and shockwave lithotripsy w/ insertion of a L EIA drug-coated stent and L JACI VBX stent, L to R PTFE fem-fem bypass, and B/L groin vac placement on 7/24 (POD #7)  -B/L groin wound vacs intact w/ adequate suction; continue wound vac changes qMWF  -B/L DP/PT signals  -Will plan to reschedule R femoral to BK pop bypass in OR; tentatively for mid to late next week in order to give pt time to medically stabilize; timing TBD   -S/P R hallux amputation w/ partial 1st ray resection by podiatry on 7/29  -ID following for ABX management, input appreciated; surgical cultures pending  -Cardiology following w/ plans for eventual consideration for PPM once infection resolved and revascularization complete   -Anemia continues S/P 3U PRBC and 1U FFP intra-op; 1U PRBC on 7/30. Hgb 7.4 today.  -Continue to monitor Hgb and transfuse < 7.0 per primary team; receiving 1U PRBC today (7/31)  -Continue ASA, brilinta and pravastatin for medical management  -GI consult pending for elevated LFTs  -Geriatrics consult pending for encephalopathy  -Continue medical management per primary team  -PT/OT follownig  -Will discuss w/ Dr. Reed    Subjective:  Pt seen for exam while lying in bed; NAD. She is oriented to person and place, but disoriented to date w/ ongoing mild confusion. Pt denies any complaints at this time.    Vitals:  /61   Pulse 88   Temp (!) 97.2 °F (36.2 °C) (Oral)   Resp 16   Ht 4' 10\" (1.473 m)   Wt 73.1 kg (161 lb 2.5 oz)   LMP  (LMP Unknown)   SpO2 98%   BMI 33.68 kg/m²     I/Os:  I/O last 3 completed shifts:  In: 1090 [P.O.:740; Blood:350]  Out: 825 [Urine:350; Drains:475]  No intake/output data recorded.    Lab Results and Cultures:   Lab Results "   Component Value Date    WBC 31.56 (H) 07/31/2024    HGB 7.4 (L) 07/31/2024    HCT 22.5 (L) 07/31/2024    MCV 99 (H) 07/31/2024     07/31/2024     Lab Results   Component Value Date    GLUCOSE 179 (H) 07/24/2024    CALCIUM 8.2 (L) 07/31/2024    K 4.7 07/31/2024    CO2 24 07/31/2024     07/31/2024    BUN 77 (H) 07/31/2024    CREATININE 2.37 (H) 07/31/2024     Lab Results   Component Value Date    INR 1.70 (H) 07/31/2024    INR 2.10 (H) 07/30/2024    INR 1.51 (H) 07/24/2024    PROTIME 20.1 (H) 07/31/2024    PROTIME 23.7 (H) 07/30/2024    PROTIME 18.4 (H) 07/24/2024     Blood Culture:   Lab Results   Component Value Date    BLOODCX No Growth After 5 Days. 07/17/2024     Urinalysis:   Lab Results   Component Value Date    COLORU Light Yellow 07/17/2024    CLARITYU Clear 07/17/2024    SPECGRAV 1.016 07/17/2024    PHUR 5.5 07/17/2024    LEUKOCYTESUR Moderate (A) 07/17/2024    NITRITE Negative 07/17/2024    GLUCOSEU Negative 07/17/2024    KETONESU Negative 07/17/2024    BILIRUBINUR Negative 07/17/2024    BLOODU Negative 07/17/2024     Urine Culture:   Lab Results   Component Value Date    URINECX (A) 07/17/2024     >100,000 cfu/ml - Lactobacillus gasseri Lactobacillus species    URINECX <10,000 cfu/ml Micrococcus luteus (A) 07/17/2024    URINECX (A) 07/17/2024     <10,000 cfu/ml - Lactobacillus rhamnosus Lactobacillus species       Medications:  Current Facility-Administered Medications   Medication Dose Route Frequency    allopurinol (ZYLOPRIM) tablet 100 mg  100 mg Oral Daily    aspirin chewable tablet 81 mg  81 mg Oral Daily    cefepime (MAXIPIME) 1,000 mg in dextrose 5 % 50 mL IVPB  1,000 mg Intravenous Q24H    escitalopram (LEXAPRO) tablet 20 mg  20 mg Oral Daily    furosemide (LASIX) tablet 20 mg  20 mg Oral Q6H    HYDROmorphone HCl (DILAUDID) injection 0.2 mg  0.2 mg Intravenous Q6H PRN    insulin detemir (LEVEMIR) subcutaneous injection 10 Units  10 Units Subcutaneous Daily    insulin lispro  (HumALOG/ADMELOG) 100 units/mL subcutaneous injection 1-5 Units  1-5 Units Subcutaneous TID AC    lactulose (CHRONULAC) oral solution 20 g  20 g Oral TID    lidocaine (LIDODERM) 5 % patch 1 patch  1 patch Topical Daily    metroNIDAZOLE (FLAGYL) tablet 500 mg  500 mg Oral Q8H JAVEIR    ondansetron (ZOFRAN) injection 4 mg  4 mg Intravenous Q6H PRN    oxyCODONE (ROXICODONE) IR tablet 5 mg  5 mg Oral Q6H PRN    sodium bicarbonate tablet 650 mg  650 mg Oral BID after meals    ticagrelor (BRILINTA) tablet 90 mg  90 mg Oral Q12H JAVIER    torsemide (DEMADEX) tablet 10 mg  10 mg Oral Daily       Imaging:  See above    Physical Exam:    General appearance: alert and oriented to person and place; ongoing mild confusion  Skin:  skin color, texture, turgor normal; R hallux amp  Neurologic: Grossly normal  Head: Normocephalic, without obvious abnormality, atraumatic  Lungs: clear to auscultation bilaterally  Heart: regular rate and rhythm  Abdomen:  soft, mild LLQ tenderness on palpation   Extremities:  extremities normal, warm, (+) motor/sensation; R hallux amp    Wound/Incision:  R hallux amp site w/ dressing clean, dry and intact. B/L groin wound vac changed today. See wound vac procedure note for details.    Pulse exam:  DP: Right: non-palpable (dressing) Left: doppler signal  PT: Right: doppler signal Left: doppler signal      GRACIELA Garcia  7/31/2024

## 2024-07-31 NOTE — CONSULTS
Consultation -  [facility]  Anamaria Parnell 79 y.o. female MRN: [id number]  Unit/Bed#: [place] [address] -01 Encounter: [id number]            Inpatient consult to gastroenterology     Date/Time  7/31/2024 7:25 PM     Performed by  Kelli Woods DO   Authorized by  Cullen Reinoso MD             Reason for Consult / Principal Problem:  Elevated Liver Chemistries       ASSESSMENT AND PLAN:      Ms. Anamaria Parnell is a 79-year-old female with a PMHx of PAD, basilar artery stenosis, CVA, hypertension, CAD, A-fib, CHF, CKD, and diabetes who initially presented with right toe pain.  Ultimately found to have right-sided lower extremity gangrene in setting of PAD.  Patient now status post vascular intervention along with right hallux amputation.  Hospital course now complicated by elevated liver chemistries for which GI is consulted.    Elevated Liver Chemistries (Hepatocellular Pattern)   Coagulopathy   Encephalopathy (Improving)   Right Foot Gangrene  Peripheral Artery Disease  Elevated Troponins  Acute on Chronic Kidney Injury    Yesterday, patient noted to be acutely encephalopathic.  Labs in the morning had demonstrated elevated transaminases that had been previously normal since her admission on 7/16/2024.  Throughout the course of the day, transaminases continued to worsen.  LFTs later found to be AST/ALT 3936/724, alkaline phosphatase 111, and total bilirubin 0.75.  INR also elevated to 2.10.  Pattern of elevation hepatocellular.  Liver enzymes elevation occurring alongside elevated troponins, worsening creatinine, worsening leukocytosis, and lactic acidosis.  Overall, hepatocellular pattern of liver chemistry elevation accompanied by additional blood work very consistent with ischemic hepatopathy picture.  Suspect low flow state due to hypotension and postoperative anemia in setting of patient's significant arterial disease leading to hepatic ischemia.  Fortunately, patient's encephalopathy is  improving today along with her lab work.  LFTs are already downtrending which is reassuring.  Coagulopathy improved and lactic acidosis resolved.  Will rule out additional causes such as thrombosis and acute hepatitis which could present similarly but do suspect that this is less likely.  Anticipate that LFTs will continue to improve daily.  In the interim, recommend close hemodynamic monitoring and support in order to avoid episodes of hypotension.    Plan:  Continue to monitor and trend LFTs and INR while watching patient's mental status  Okay to continue on lactulose 20 g TID; no indication to trend ammonia levels  Acute hepatitis panel ordered to follow-up  RUQ US with Dopplers ordered to further evaluate and rule out thrombosis  Continue to monitor hemodynamics, avoid episodes of hypotension  Cardiology consulted, greatly appreciate ongoing support  Additional pain and symptom management per primary team  ______________________________________________________________________    HPI:  Ms. Anamaria Parnell is a 79-year-old female with a past medical history of peripheral artery disease with aortic disease, basilar artery stenosis, CVA, hypertension, CAD, A-fib, CHF, CKD, and diabetes who initially presented on 7/16/2024 with right toe pain that has been progressively worsening.  Ultimately found to have gangrene in setting of severe arterial disease.  Patient is now status post bilateral femoral endarterectomy with stenting and VAC placement with vascular surgery on 7/24 and also status post right hallux amputation on 7/29 with podiatry.  Hospital course has now been complicated by acute encephalopathy and rise in liver chemistries for which GI is now consulted.    Patient noted to be acutely confused in the morning of 7/30/2024.  Workup revealed negative CT imaging but multiple lab abnormalities.  LFTs had been very mildly elevated on 7/29/2024.  On the morning of 7/30/2024 LFTs as follows: AST//126,  alkaline phosphatase 110, and total bilirubin 0.67.  Later in the afternoon, LFTs worsened to AST/ALT 3936/724, alkaline phosphatase 111, and total bilirubin 0.75.  INR 2.10.  This was also accompanied by significantly elevated troponin of 4677 with no ischemic changes on EKG.  Worsening leukocytosis of 42.69 and worsening creatinine of 2.17.  Patient also noted to have a lactic acidosis of 4.6.  Hemoglobin that morning had declined to 6.4 following surgical intervention on 7/29.  Patient now status post PRBC transfusion with improvement in hemoglobin to 7.4.  Review of patient's vital signs, patient did show a decline in her blood pressure yesterday to systolics of 100 to 110s. Lowest blood pressure charted was 89/56 mmHg.     Patient seen and evaluated at bedside this morning.  Lying in bed comfortably in no acute distress or visible discomfort.  Very pleasant.  Son also present at bedside.  Patient remains disoriented and only oriented x 2.  Able to participate in conversation however it son does admit to patient being slightly confused with states that mental status is significantly improved from yesterday.  Patient currently without complaints.  Denies any nausea/vomiting.  No abdominal pain.  No changes in bowel habits including black/bloody stools.  No fever/chills.  She also denies any prior history of underlying liver disease.  No significant alcohol use and no prior drug use.    REVIEW OF SYSTEMS:    CONSTITUTIONAL: Denies any fever, chills, rigors, and weight loss.  HEENT: No earache or tinnitus. Denies hearing loss or visual disturbances.  CARDIOVASCULAR: No chest pain or palpitations.   RESPIRATORY: Denies any cough, hemoptysis, shortness of breath or dyspnea on exertion.  GASTROINTESTINAL: As noted in the History of Present Illness.   GENITOURINARY: No problems with urination. Denies any hematuria or dysuria.  NEUROLOGIC: No dizziness or vertigo, denies headaches.   MUSCULOSKELETAL: Denies any muscle  or joint pain.   SKIN: Denies skin rashes or itching.   ENDOCRINE: Denies excessive thirst. Denies intolerance to heat or cold.  PSYCHOSOCIAL: Denies depression or anxiety. Denies any recent memory loss.       Historical Information   Past Medical History:   Diagnosis Date    Aphasia as late effect of cerebrovascular accident (CVA) 08/16/2023    Arthritis     Chronic kidney disease     Diabetes mellitus (HCC)     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/20/2021    Endometriosis     High cholesterol     History of left common carotid artery stent placement 10/27/2023    Hypertension     Kidney disease, chronic, stage III (moderate, EGFR 30-59 ml/min) ([facility])     Lumbar disc herniation     Median arcuate ligament syndrome (HCC) 11/05/2019    Neuropathy     Obesity (BMI 30-39.9) 12/04/2017    Obesity, morbid ([facility]) 01/20/2021    Peripheral vascular disease ([facility])     Pneumonia     Shoulder injury     left    Spinal stenosis     Stroke ([facility]) 2015    Memory loss     Past Surgical History:   Procedure Laterality Date    ARTERIOGRAM Bilateral 7/24/2024    Procedure: ARTERIOGRAM;  Surgeon: Jose Crum DO;  [facility];  Service: Vascular    CARDIAC CATHETERIZATION      CAROTID STENT Left 9/7/2023    Procedure: L TCAR;  Surgeon: Nicole Gallo MD;  [facility];  Service: Vascular    COLONOSCOPY      FL RETROGRADE PYELOGRAM  4/6/2020    FRACTURE SURGERY Right     ankle    IR CAROTID STENT  9/7/2023    IR LOWER EXTREMITY ANGIOGRAM  7/24/2024    OVARIAN CYST SURGERY      NV CYSTO BLADDER W/URETERAL CATHETERIZATION Bilateral 4/6/2020    Procedure: CYSTOSCOPY WITH RETROGRADE PYELOGRAM;  Surgeon: Robert Mitchell MD;  [facility];  Service: Urology    NV CYSTO W/INSERT URETERAL STENT Right 4/6/2020    Procedure: INSERTION STENT URETERAL;  Surgeon: Robert Mitchell MD;  [facility];  Service: Urology    NV CYSTO W/REMOVAL OF TUMORS SMALL N/A 4/6/2020     Procedure: TRANSURETHRAL RESECTION OF BLADDER TUMOR (TURBT);  Surgeon: Robert Mitchell MD;  [facility];  Service: Urology    MS TEAEC W/WO PATCH GRAFT COMMON FEMORAL Bilateral 2024    Procedure: Bilateral femoral endarterectomies with patch angioplasty; Retrograde iliac intervention, shockwave, stent placement and angioplasty; femoral to femoral bypass with PTFE;  Surgeon: Jose Crum DO;  [facility];  Service: Vascular    ROTATOR CUFF REPAIR Left     TOE AMPUTATION Right 2024    Procedure: Rigth Hallux amputation, partial 1st ray resection;  Surgeon: Eddie Farooq DPM;  [facility];  Service: Podiatry    TONSILLECTOMY       Social History   Social History     Substance and Sexual Activity   Alcohol Use Never     Social History     Substance and Sexual Activity   Drug Use Never     Social History     Tobacco Use   Smoking Status Former    Current packs/day: 0.00    Average packs/day: 2.0 packs/day for 54.6 years (109.1 ttl pk-yrs)    Types: Cigarettes    Start date: 1966    Quit date: 2020    Years since quittin.0   Smokeless Tobacco Never     Family History   Problem Relation Age of Onset    Hypertension Mother     Heart disease Mother         Valvular    Hyperlipidemia Mother     Hypertension Father     Heart defect Father         Cardiomegaly    Stroke Sister         Cerebrovascular Accident    Arthritis Brother     Other Brother         Back Disorder       Meds/Allergies       Medications Prior to Admission:     acetaminophen (TYLENOL) 500 mg tablet    allopurinol (ZYLOPRIM) 100 mg tablet    labetalol (NORMODYNE) 300 mg tablet    losartan (COZAAR) 100 MG tablet    pravastatin (PRAVACHOL) 80 mg tablet    ticagrelor (BRILINTA) 90 MG    traZODone (DESYREL) 50 mg tablet    amLODIPine (NORVASC) 5 mg tablet    aspirin 81 mg chewable tablet    B-D ULTRAFINE III SHORT PEN 31G X 8 MM MISC    Blood Glucose Monitoring Suppl (OneTouch Verio Reflect) w/Device  "KIT    cinacalcet (SENSIPAR) 30 mg tablet    cloNIDine (CATAPRES-TTS-3) 0.3 mg/24 hr    collagenase (SANTYL) ointment    Diclofenac Sodium (VOLTAREN) 1 %    escitalopram (LEXAPRO) 20 mg tablet    Glucagon 1 MG/0.2ML SOAJ    glucose blood (OneTouch Verio) test strip    levofloxacin (LEVAQUIN) 500 mg tablet    lidocaine (LIDODERM) 5 %    linaGLIPtin (Tradjenta) 5 MG TABS    OneTouch Delica Lancets 33G MISC  Current Facility-Administered Medications   Medication Dose Route Frequency    allopurinol (ZYLOPRIM) tablet 100 mg  100 mg Oral Daily    aspirin chewable tablet 81 mg  81 mg Oral Daily    cefepime (MAXIPIME) 1,000 mg in dextrose 5 % 50 mL IVPB  1,000 mg Intravenous Q24H    escitalopram (LEXAPRO) tablet 20 mg  20 mg Oral Daily    HYDROmorphone HCl (DILAUDID) injection 0.2 mg  0.2 mg Intravenous Q6H PRN    insulin detemir (LEVEMIR) subcutaneous injection 10 Units  10 Units Subcutaneous Daily    insulin lispro (HumALOG/ADMELOG) 100 units/mL subcutaneous injection 1-5 Units  1-5 Units Subcutaneous TID AC    lactulose (CHRONULAC) oral solution 20 g  20 g Oral TID    lidocaine (LIDODERM) 5 % patch 1 patch  1 patch Topical Daily    metroNIDAZOLE (FLAGYL) tablet 500 mg  500 mg Oral Q8H JAVIER    ondansetron (ZOFRAN) injection 4 mg  4 mg Intravenous Q6H PRN    oxyCODONE (ROXICODONE) IR tablet 5 mg  5 mg Oral Q6H PRN    sodium bicarbonate tablet 650 mg  650 mg Oral BID after meals    ticagrelor (BRILINTA) tablet 90 mg  90 mg Oral Q12H JAVIER    torsemide (DEMADEX) tablet 10 mg  10 mg Oral Daily       Allergies   Allergen Reactions    Fenofibrate Other (See Comments)      blood in urine  hx  Kidney Failure    Colesevelam Other (See Comments)      leg pains    Colestipol Itching and Other (See Comments)      Swelling lower legs    Ezetimibe GI Intolerance    Statins Myalgia           Objective     Blood pressure 102/62, pulse 94, temperature 97.6 °F (36.4 °C), temperature source Oral, resp. rate 16, height 4' 10\" (1.473 m), weight " 73.1 kg (161 lb 2.5 oz), SpO2 96%. Body mass index is 33.68 kg/m².      Intake/Output Summary (Last 24 hours) at 7/31/2024 1009  Last data filed at 7/31/2024 0601  Gross per 24 hour   Intake 610 ml   Output 625 ml   Net -15 ml         Physical Exam  Constitutional:       General: She is not in acute distress.     Appearance: She is normal weight. She is not ill-appearing or toxic-appearing.   HENT:      Head: Normocephalic and atraumatic.      Nose: Nose normal.   Eyes:      General: No scleral icterus.  Cardiovascular:      Pulses: Normal pulses.   Pulmonary:      Effort: Pulmonary effort is normal.   Abdominal:      General: Abdomen is flat. Bowel sounds are normal. There is no distension.      Tenderness: There is no abdominal tenderness. There is no guarding.      Hernia: No hernia is present.   Musculoskeletal:      Cervical back: Normal range of motion.   Skin:     General: Skin is warm.      Coloration: Skin is not pale.   Neurological:      Mental Status: She is alert. She is disoriented.   Psychiatric:         Mood and Affect: Mood normal.         Behavior: Behavior normal.           Lab Results:   No results displayed because visit has over 200 results.          Imaging Studies: I have personally reviewed pertinent imaging studies.

## 2024-07-31 NOTE — PROGRESS NOTES
"Progress Note - Cardiology   Anamaria Parnell 79 y.o. female MRN: [id number]  Unit/Bed#: [place] [address] -01 Encounter: [id number]      Assessment/Recommendations/Discussion:   Right great toe gangrene/osteomyelitis  Severe peripheral artery disease status post revascularization  Paroxysmal atrial fibrillation with suspected tachybradycardia syndrome with a baseline left bundle branch block and first-degree AV block with occasional high degree AV block while on beta-blocker, now resolved  Beta-blocker toxicity now resolved  Postsurgical anemia  Underlying coronary artery disease with reduced EF 41% and regional wall motion abnormalities  History of stroke on dual to platelet therapy  Hypertension  Type 2 diabetes  Stage III-IV chronic kidney disease  Aortoiliac occlusive disease  Renal artery stenosis  Mesenteric artery stenosis  Ambulatory dysfunction           PLAN  Reviewed telemetry, no significant further pauses or severe bradycardia/heart block  Continue to hold AV harinder blocking medications  Eventual consideration for pacemaker implantation once infection resolved and revascularization complete  Hemoglobin back down to 7.4, ideally would transfuse to keep this greater than 8 given CAD history    Subjective:   HPI  Denies chest pain or shortness of breath today, feels very fatigued however      Review of Systems: As noted in HPI. Rest of ROS is negative.    Vitals:   /61   Pulse 92   Temp 97.8 °F (36.6 °C) (Oral)   Resp 16   Ht 4' 10\" (1.473 m)   Wt 73.1 kg (161 lb 2.5 oz)   LMP  (LMP Unknown)   SpO2 98%   BMI 33.68 kg/m²   I/O         07/29 0701 07/30 0700 07/30 0701 07/31 0700 07/31 0701  08/01 0700    P.O. 480 260     I.V. (mL/kg) 400 (5.7)      Blood  350     Total Intake(mL/kg) 880 (12.5) 610 (8.3)     Urine (mL/kg/hr) 600 (0.4) 150 (0.1)     Drains  475     Stool       Total Output 600 625     Net +280 -15            Unmeasured Urine Occurrence  1 x     Unmeasured Stool Occurrence   1 x      "     Weight (last 2 days)       Date/Time Weight    07/31/24 0600 73.1 (161.16)            Physical Exam   Constitutional: awake, alert and oriented, in no acute distress, no obvious deformities, obese female  Head: Normocephalic, without obvious abnormality, atraumatic  Eyes: conjunctivae clear and moist. Sclera anicteric. No xanthelasmas. Pupils equal bilaterally. Extraocular motions are full.  Ear nose mouth and throat: ears are symmetrical bilaterally, hearing appears to be equal bilaterally, no nasal discharge or epistaxis, oropharynx is clear with moist mucous membranes  Neck: Trachea is midline, neck is supple, no thyromegaly or significant lymphadenopathy, there is full range of motion.  Lungs: clear to auscultation bilaterally, no wheezes, no rales, no rhonchi, no accessory muscle use, breathing is nonlabored  Heart: regular rate and rhythm, S1, S2 normal, no murmur, no click, no rub and no gallop, no lower extremity edema  Abdomen: Obese, soft, non-tender; bowel sounds normal; no masses, no organomegaly  Psychiatric: Patient is oriented to time, place, person, mood/affect is negative for depression, anxiety, agitation, appears to have appropriate insight  Skin: Right lower extremity wound noted with dressing in place clean dry and intact    TELEMETRY: No further severe bradycardia  Lab Results:  Results from last 7 days   Lab Units 07/31/24  0502   WBC Thousand/uL 31.56*   HEMOGLOBIN g/dL 7.4*   HEMATOCRIT % 22.5*   PLATELETS Thousands/uL 318     Results from last 7 days   Lab Units 07/31/24  0502 07/24/24  1939 07/24/24  1551   POTASSIUM mmol/L 4.7   < >  --    CHLORIDE mmol/L 108   < >  --    CO2 mmol/L 24   < >  --    CO2, I-STAT mmol/L  --   --  18*   BUN mg/dL 77*   < >  --    CREATININE mg/dL 2.37*   < >  --    CALCIUM mg/dL 8.2*   < >  --    ALK PHOS U/L 105*   < >  --    ALT U/L 516*   < >  --    AST U/L 2,280*   < >  --    GLUCOSE, ISTAT mg/dl  --   --  179*    < > = values in this interval not  displayed.     Results from last 7 days   Lab Units 07/31/24  0502 07/24/24  1939 07/24/24  1551   POTASSIUM mmol/L 4.7   < >  --    CHLORIDE mmol/L 108   < >  --    CO2 mmol/L 24   < >  --    CO2, I-STAT mmol/L  --   --  18*   BUN mg/dL 77*   < >  --    CREATININE mg/dL 2.37*   < >  --    GLUCOSE, ISTAT mg/dl  --   --  179*   CALCIUM mg/dL 8.2*   < >  --     < > = values in this interval not displayed.           Medications:    Current Facility-Administered Medications:     allopurinol (ZYLOPRIM) tablet 100 mg, 100 mg, Oral, Daily, Costa Omer DPM, 100 mg at 07/31/24 0843    aspirin chewable tablet 81 mg, 81 mg, Oral, Daily, Costa Omer DPM, 81 mg at 07/31/24 0843    cefepime (MAXIPIME) 1,000 mg in dextrose 5 % 50 mL IVPB, 1,000 mg, Intravenous, Q24H, Soraida Tobias PA-C, Last Rate: 100 mL/hr at 07/31/24 0155, 1,000 mg at 07/31/24 0155    escitalopram (LEXAPRO) tablet 20 mg, 20 mg, Oral, Daily, Costa Omer DPM, 20 mg at 07/31/24 0843    furosemide (LASIX) tablet 20 mg, 20 mg, Oral, Q6H, Cullen Reinoso MD    HYDROmorphone HCl (DILAUDID) injection 0.2 mg, 0.2 mg, Intravenous, Q6H PRN, Costa Omer DPM    insulin detemir (LEVEMIR) subcutaneous injection 10 Units, 10 Units, Subcutaneous, Daily, Cullen Reinoso MD, 10 Units at 07/31/24 1101    insulin lispro (HumALOG/ADMELOG) 100 units/mL subcutaneous injection 1-5 Units, 1-5 Units, Subcutaneous, TID AC, 3 Units at 07/31/24 0842 **AND** Fingerstick Glucose (POCT), , , TID AC, Costa Omer DPM    lactulose (CHRONULAC) oral solution 20 g, 20 g, Oral, TID, Soraida Tobias PA-C, 20 g at 07/31/24 0843    lidocaine (LIDODERM) 5 % patch 1 patch, 1 patch, Topical, Daily, Costa Omer DPM, 1 patch at 07/28/24 0840    metroNIDAZOLE (FLAGYL) tablet 500 mg, 500 mg, Oral, Q8H JAVIER, Costa Omer DPM, 500 mg at 07/31/24 0538    ondansetron (ZOFRAN) injection 4 mg, 4 mg, Intravenous, Q6H PRN, Costa Omer,  "DPM    oxyCODONE (ROXICODONE) IR tablet 5 mg, 5 mg, Oral, Q6H PRN, Costa Omer, DPM, 5 mg at 07/29/24 1954    sodium bicarbonate tablet 650 mg, 650 mg, Oral, BID after meals, Costa Omer DPM, 650 mg at 07/31/24 0843    ticagrelor (BRILINTA) tablet 90 mg, 90 mg, Oral, Q12H JAVIER, Costa Omer DPM, 90 mg at 07/31/24 0843    torsemide (DEMADEX) tablet 10 mg, 10 mg, Oral, Daily, Costa Omer DPM, 10 mg at 07/29/24 0753    Portions of the record may have been created with voice recognition software. Occasional wrong word or \"sound a like\" substitutions may have occurred due to the inherent limitations of voice recognition software. Read the chart carefully and recognize, using context, where substitutions have occurred.    Timothy Rey DO, [facility], FASNC  7/31/2024 11:11 AM      "

## 2024-07-31 NOTE — PROCEDURES
Vascular Surgery  Anamaria Parnell 79 y.o. female MRN: [id number]  Unit/Bed#: [address]-01 Encounter: [id number]    [facility] Procedure Note    Date: 7/31/24  Time: 13:30    Location of wound: B/L groins    Sponges removed:  3 Black Sponges: 1 from each wound, 1 bridge  0 White Sponges    Dimensions of wound: Right: 6 cm x 3 cm x 2.5 cm  Left: 5.5 cm x 2.5 cm x 2 cm    Description of wound: B/L surgical groin incisions w/ fatty tissue visible. Moderate amount of serous drainage from both sites, R >L.       R groin    L groin    Sponges placed:  3 Black Sponges: 1 to each wound, 1 bridge  0 White Sponges    VAC settings:  125 mmHg  Continuous    Pt tolerated procedure well  VAC sticker placed to wound dressing, per protocol      GRACIELA Garcia  7/31/2024

## 2024-07-31 NOTE — ASSESSMENT & PLAN NOTE
Lab Results   Component Value Date    HGBA1C 5.9 (H) 07/09/2024     Recent Labs     07/30/24  1116 07/30/24  1616 07/30/24  2102 07/31/24  0755   POCGLU 200* 195* 253* 285*       Home regimen: Tradjenta  Inpatient regimen: Sliding scale    7/31-update: Patient's glucose levels not at goal, may represent physiological reaction to ongoing treatment plan.  Have added 10 units long-acting insulin daily, monitor and uptitrate/add prandial insulin as needed

## 2024-07-31 NOTE — PROGRESS NOTES
Progress Note - Infectious Disease   Anamaria Parnell 79 y.o. female MRN: [id number]  Unit/Bed#: [place] [address] -01 Encounter: [id number]    Impression/Plan:  1. Leukocytosis. Likely reactive in setting of critical limb ischemia. Also consider role of R foot infection. Now with operative culture suggesting a non lactose fermenting GNR. No other clear source appreciated. Single set of blood cultures was negative. She's remained afebrile. WBC had been trending down but increased overnight. This was likely multifactorial given patient just had surgery and is suspected to have shock liver. Will recheck CBCD tomorrow morning.  -antibiotics as below  -check CBCD and BMP tomorrow  -follow up operative cultures  -follow up liver work up as below  -monitor vitals    2. R hallux dry gangrene. In setting of severe PAD with critical limb ischemia and DMII. Patient underwent 10-day course of IV cefazolin prior to surgical intervention. Podiatry took patient to the OR on 7/29/2024 for R hallux amputation and partial 1st ray resection. Wound is packed open and will likely require definitive closure once patient is medically stable. Patient had been on cefazolin and flagyl but antibiotic regimen was broadened to cefepime and flagyl overnight due to worsening leukocytosis and lactic acidosis. Tissue culture now suggesting a non lactose fermenting GNR. Would recommend continuing the cefepime and flagyl for now while we await further culture work up.  -continue IV cefepime  -continue PO flagyl  -check CBCD and BMP tomorrow  -follow up wound culture  -monitor vitals  -serial R foot exams  -surgical site care per podiatry  -continue follow up with podiatry     3. PAD. LEAD from 7/16/2024 with severe B/L LE disease through the femoral-popliteal arteries with high grade stenosis vs occlusion. Vascular surgery has been following closely. Patient is s/p bilateral femoral endarterectomy with left to right femorofemoral PTFE bypass and  retrograde left JACI, left EIA stenting, and bilateral groin VAC placement on 7/24/2024. She will need definitive R sided fem-pop bypass which is tentatively planned for 8/2/2024.  -serial B/L LE exams  -VAC care per vascular surgery  -continue close follow up with vascular surgery     4. Type 2 diabetes mellitus without long term insulin use. Patient's last HbA1c was 5.9% on 7/9/2024. Elevated blood glucose is risk factor for infection.   -recommend tight glycemic control  -blood glucose management per primary service    5. RICARDO. On CKD stage 3. This impacts antibiotic dosing. Upon review of patient's available medical records it appears her baseline creatinine is approximately 1.3-1.8. Creatinine upon admission was 2.19. Likely prerenal in setting of acute foot infection. Consider role of renal artery stenosis. Nephrology is following. Creatinine did initially improve. Now elevated again today.  -check creatinine again tomorrow  -dose adjust antibiotics for renal function as needed  -avoid nephrotoxins  -volume management per primary service/nephrology  -continue follow up with nephrology    6. Possible shock liver. Patient with late development yesterday of elevated LFTs, ammonia, troponin, and lactic acid. Also with episodes of hypotension last night. She has been given albumin and PRBC. Cardiology is following. Gastroenterology was consulted and RUQ ultrasound with dopplers has been recommended.  -check CBCD and CMP tomorrow  -transfusion support per primary service  -dose adjust antibiotics for hepatic function as needed  -serial abdominal exams  -monitor GI symptoms  -follow up RUQ US with dopplers  -continue follow up with cardiology  -continue follow up with gastroenterology     Above plan was discussed in detail with patient at the bedside.  Above plan was discussed in detail with NICOLLE who agrees with plan for ongoing cefepime and flagyl.    Antibiotics:  Cefepime 1  Flagyl 3  Antibiotics 3 (plus 9 days of  cefazolin earlier in hospitalization)    Subjective:  Patient reports she's pretty tired today. Reports a lot of people have been in her room overnight, she's hoping to sleep a bit more this morning. She is not having pain in her B/L groins or the R foot today. She denies chills, sweats, shakes, nausea, vomiting, abdominal pain, difficulty breathing, cough, shortness of breath, and chest pain. No new symptoms.    Objective:  Vitals:  Temp:  [95.8 °F (35.4 °C)-98.2 °F (36.8 °C)] 97.2 °F (36.2 °C)  HR:  [] 94  Resp:  [18-22] 18  BP: ()/(53-67) 123/62  SpO2:  [92 %-99 %] 97 %  Temp (24hrs), Av.4 °F (36.3 °C), Min:95.8 °F (35.4 °C), Max:98.2 °F (36.8 °C)  Current: Temperature: (!) 97.2 °F (36.2 °C)    Physical Exam:   General Appearance:  Alert, interactive, nontoxic, no acute distress. She appears comfortable semisupine in bed, was sleeping when I entered but woke easily to verbal stimuli.   Throat: Oropharynx moist without lesions.    Lungs:   Clear to auscultation bilaterally; no wheezes, rhonchi or rales; respirations unlabored on room air.   Heart:  Tachycardic; no murmur, rub or gallop.   Abdomen:   Soft, obese, non-tender, non-distended, positive bowel sounds.  B/L groins with intact VAC dressings, no leakage, no spreading erythema from site.   Extremities: R foot dressing is clean and intact.   Skin: No new rashes noted on exposed skin.     Labs, Imaging, & Other studies:   All pertinent labs and imaging studies were personally reviewed  Results from last 7 days   Lab Units 24  0502 24  18024  0449   WBC Thousand/uL 31.56* 42.69* 37.50*   HEMOGLOBIN g/dL 7.4* 8.3* 6.4*   PLATELETS Thousands/uL 318 341 365     Results from last 7 days   Lab Units 24  1808 24  0449 24  0252 24  1939 24  1551   POTASSIUM mmol/L 5.2 4.7 4.0   < >  --    CHLORIDE mmol/L 109* 108 108   < >  --    CO2 mmol/L 19* 21 25   < >  --    CO2, I-STAT mmol/L  --   --   --   --   18*   BUN mg/dL 70* 56* 47*   < >  --    CREATININE mg/dL 2.17* 1.80* 1.55*   < >  --    EGFR ml/min/1.73sq m 21 26 32   < >  --    GLUCOSE, ISTAT mg/dl  --   --   --   --  179*   CALCIUM mg/dL 8.2* 8.0* 8.3*   < >  --    AST U/L 3,936* 411* 229*   < >  --    ALT U/L 724* 126* 78*   < >  --    ALK PHOS U/L 111* 110* 103   < >  --     < > = values in this interval not displayed.     Results from last 7 days   Lab Units 07/29/24  9207   GRAM STAIN RESULT  Rare Polys*  Rare Gram negative rods*

## 2024-07-31 NOTE — TELEPHONE ENCOUNTER
LM stating that 07/31 appt was canceled due to being in hospital. Asked pt to call us back to schedule hosp follow up.

## 2024-07-31 NOTE — PROGRESS NOTES
[facility]  Progress Note  Name: Anamaria Parnell I  MRN: [id number]  Unit/Bed#: [place] [address] -01 I Date of Admission: 7/19/2024   Date of Service: 7/31/2024 I Hospital Day: 12    Assessment & Plan   * Aortoiliac occlusive disease (HCC)  Assessment & Plan  PAD and aortic disease, R CLTI and R great toe dry gangrene. S/P Bilateral femoral endarterectomy with L to R fem-fem PTFE bypass and retrograde L JACI, L EIA stenting, bilateral groin VAC placement on 7/24   Intervention planned for Friday, 8/2    Acute blood loss anemia  Assessment & Plan  Patient noted to have anemia on yesterday morning's labs, likely secondary to recent surgical procedures  Patient with deconditioning and other chronic illnesses; was transfused 1 unit PRBCs  Given other comorbidities secondary to acute blood loss anemia, will attempt to keep hemoglobin levels above 8  Currently 7.4; will transfuse 1 unit PRBCs today  Concurrently, concerned about volume status; this confirmed by chest x-ray on 7/29  Continue gentle oral diuretics, consider once along IV Lasix following today's transfusion    Acute liver failure without hepatic coma  Assessment & Plan  Patient experienced profound elevation in LFTs on evening of 7/30-likely shock liver in the setting of acute anemia  Has been transfused, LFTs this morning much improved  Ammonia now within normal limits; consult to gastroenterology    Elevated troponin  Assessment & Plan  Patient noted to have elevated troponin this afternoon as part of encephalopathy workup; currently 4677  Tachycardic overnight and anemic, likely elevated in demand ischemia  Have reversed anemia with blood transfusion hemoglobin currently 8.2  Monitor on telemetry; EKG with no acute findings  Have discussed with cardiology, reverse likely contributing factors and reassess      Encephalopathy  Assessment & Plan  Patient noted to be acutely confused this morning  Likely multifactorial table to late  "afternoon early evening anesthesia and lack of sleep overnight    However, patient with history of stroke and given recent surgical procedure, some concern for possible CVA versus TIA  Repeat labs pending; consult to geriatrics  Stat CT head obtained, no acute events noted  Continue DAPT therapy for now  Trop highly elevated, have discussed with cardiology, correct reversible causes of possible demand ischemia   EKG with no significant changes since yesterday-no STEMI  Reassess in a.m., discussed with geriatrics    Workup of altered mental status:  Alcohol - neg etoh intake  Epilepsy/electrolytes: no history of seizure disorder; labs stable  Insulin - blood glucose WNL  Opiates and oxygen: SpO2-96% on room air; opiates used sparingly  Uremia - creatinine elevated, but stable; will obtain ammonia levels given profound increase in LFTs  Trauma/temperature - atraumatic; afebrile; not hypothermic  Infection -source control obtained surgically yesterday, on antibiotics now  Poisons/psychogenic - no hx of acute poisoning; medications reviewed; unclear if psychogenic  Stroke/seizures: CT head negative for acute bleed or ischemic event; no hx of seizure activity      Chronic HFrEF (heart failure with reduced ejection fraction) (HCC)  Assessment & Plan  Wt Readings from Last 3 Encounters:   07/31/24 73.1 kg (161 lb 2.5 oz)   07/19/24 67.2 kg (148 lb 2.4 oz)   07/15/24 65.3 kg (144 lb)     Followed by cardiology, continue diuretics  AV node blockers on hold secondary to bradycardia  Remains 96% SpO2 on room air, not in acute exacerbation        Paroxysmal atrial fibrillation (HCC)  Assessment & Plan  Converted spontaneously before she was placed on telemetry. ECG reviewed with cardiology.  Per cardiology: \"Given her anemia, DAPT for recent surgery, recent surgery and low A-fib burden, will hold off starting anticoagulation for now.\"     7/29-update: Patient now with tachybradycardia syndrome; cardiology recommending transfer " to [place] sometime following Friday's vascular surgery procedure for possible implantation of pacemaker.    Coronary artery disease of native artery of native heart with stable angina pectoris (HCC)  Assessment & Plan  CAD with history of PCI.  No chest pain.  On DAPT  Seen in consultation with cardiology for preoperative risk assessment    Cellulitis of toe of right foot  Assessment & Plan  Patient presenting with right big toe gangrene and associated cellulitis  Patient remains afebrile, nontoxic; however white count trended up substantially overnight, source control needed  Continue IV Ancef  Patient underwent right hallux amputation on 7/29, followed by podiatry    Primary hypertension  Assessment & Plan  Controlled  Continue amlodipine, clonidine, metoprolol    Type 2 diabetes mellitus, without long-term current use of insulin ([facility])  Assessment & Plan  Lab Results   Component Value Date    HGBA1C 5.9 (H) 07/09/2024     Recent Labs     07/30/24  1116 07/30/24  1616 07/30/24  2102 07/31/24  0755   POCGLU 200* 195* 253* 285*       Home regimen: Tradjenta  Inpatient regimen: Sliding scale    7/31-update: Patient's glucose levels not at goal, may represent physiological reaction to ongoing treatment plan.  Have added 10 units long-acting insulin daily, monitor and uptitrate/add prandial insulin as needed      Depression, recurrent (HCC)  Assessment & Plan  Continue Lexapro    Acute renal failure (ARF) ([facility])  Assessment & Plan  Baseline 1.4-1.7  Initial RICARDO likely due to sepsis / pre-renal.  Had resolved, but unfortunately patient with acute blood loss anemia yesterday and creatinine spiked  Monitor and reconsult nephrology as needed  Monitor on morning labs now the patient has been transfused; anticipate overall improvement      Recent Labs     07/30/24  0449 07/30/24  1808 07/31/24  0502   BUN 56* 70* 77*   CREATININE 1.80* 2.17* 2.37*   EGFR 26 21 19                 CVA (cerebral vascular accident)  (HCC)  Assessment & Plan  History of CVA. MRI 7/2023 showing: Large area of acute to subacute ischemia left PCA distribution occipital lobe. Small foci of acute to subacute ischemia in the left frontal and left parietal/temporal lobes MCA distribution.  Continue Aspirin and statin    Basilar artery stenosis  Assessment & Plan  history of carotid surgery.    Follows with Dr. Gallo               VTE Pharmacologic Prophylaxis: VTE Score: 3 Moderate Risk (Score 3-4) - Pharmacological DVT Prophylaxis Contraindicated. Sequential Compression Devices Ordered.    Mobility:   Basic Mobility Inpatient Raw Score: 8  -[facility] Goal: 3: Sit at edge of bed  JH-HLM Achieved: 4: Move to chair/commode  JH-HLM Goal achieved. Continue to encourage appropriate mobility.    Patient Centered Rounds: I performed bedside rounds with nursing staff today.   Discussions with Specialists or Other Care Team Provider: Geriatrics, vascular surgery    Education and Discussions with Family / Patient: Updated  (son) at bedside.    Total Time Spent on Date of Encounter in care of patient: 45 mins. This time was spent on one or more of the following: performing physical exam; counseling and coordination of care; obtaining or reviewing history; documenting in the medical record; reviewing/ordering tests, medications or procedures; communicating with other healthcare professionals and discussing with patient's family/caregivers.    Current Length of Stay: 12 day(s)  Current Patient Status: Inpatient   Certification Statement: The patient will continue to require additional inpatient hospital stay due to awaiting revascularization of right lower extremity  Discharge Plan: Anticipate discharge in >72 hrs to discharge location to be determined pending rehab evaluations.    Code Status: Level 3 - DNAR and DNI    Subjective:   Patient seen and examined bedside, overall appears much improved from yesterday's exam with better cognition and overall  appears more comfortable.  Liver enzymes trending down as his troponins.  Again, as per yesterday's note, likely secondary to acute ischemic status post surgery.  Patient with elevated creatinine, but a 2.37 not that much higher than her baseline of 1.7 and continues to make urine.  Anticipate with further transfusion and gentle IV diuresis, creatinine to trend down overnight.  H&H ordered, monitor on morning labs, all discussed with patient's son at bedside.    Objective:     Vitals:   Temp (24hrs), Av.3 °F (36.3 °C), Min:95.8 °F (35.4 °C), Max:98.2 °F (36.8 °C)    Temp:  [95.8 °F (35.4 °C)-98.2 °F (36.8 °C)] 96.6 °F (35.9 °C)  HR:  [88-98] 95  Resp:  [16-18] 16  BP: ()/(54-73) 137/73  SpO2:  [93 %-98 %] 96 %  Body mass index is 33.68 kg/m².     Input and Output Summary (last 24 hours):     Intake/Output Summary (Last 24 hours) at 2024 1711  Last data filed at 2024 1511  Gross per 24 hour   Intake 610 ml   Output 625 ml   Net -15 ml       Physical Exam:   Physical Exam  Vitals and nursing note reviewed.   Constitutional:       General: She is not in acute distress.     Appearance: She is well-developed.   HENT:      Head: Normocephalic and atraumatic.   Eyes:      Conjunctiva/sclera: Conjunctivae normal.   Cardiovascular:      Rate and Rhythm: Normal rate. Rhythm irregular.   Pulmonary:      Effort: Pulmonary effort is normal. No respiratory distress.   Abdominal:      Palpations: Abdomen is soft.      Tenderness: There is no abdominal tenderness.   Musculoskeletal:         General: Swelling, deformity and signs of injury present.      Cervical back: Neck supple.      Right lower leg: Edema present.   Skin:     General: Skin is warm and dry.      Comments: Bilateral groin incisions   Neurological:      Mental Status: She is alert.   Psychiatric:         Mood and Affect: Mood normal.            Additional Data:     Labs:  Results from last 7 days   Lab Units 24  0502 24  1941  07/30/24  0449   WBC Thousand/uL 31.56*   < > 37.50*   HEMOGLOBIN g/dL 7.4*   < > 6.4*   HEMATOCRIT % 22.5*   < > 20.6*   PLATELETS Thousands/uL 318   < > 365   BANDS PCT % 4  --   --    SEGS PCT %  --   --  82*   LYMPHO PCT % 3*  --  3*   MONO PCT % 5  --  4   EOS PCT % 0  --  0    < > = values in this interval not displayed.     Results from last 7 days   Lab Units 07/31/24  0502   SODIUM mmol/L 144   POTASSIUM mmol/L 4.7   CHLORIDE mmol/L 108   CO2 mmol/L 24   BUN mg/dL 77*   CREATININE mg/dL 2.37*   ANION GAP mmol/L 12   CALCIUM mg/dL 8.2*   ALBUMIN g/dL 3.5   TOTAL BILIRUBIN mg/dL 0.89   ALK PHOS U/L 105*   ALT U/L 516*   AST U/L 2,280*   GLUCOSE RANDOM mg/dL 283*     Results from last 7 days   Lab Units 07/31/24  1111   INR  1.70*     Results from last 7 days   Lab Units 07/31/24  1634 07/31/24  1117 07/31/24  0755 07/30/24  2102 07/30/24  1616 07/30/24  1116 07/30/24  0726 07/29/24  1830 07/29/24  1615 07/29/24  1105 07/29/24  0719 07/29/24  0156   POC GLUCOSE mg/dl 247* 260* 285* 253* 195* 200* 203* 147* 148* 191* 216* 190*         Results from last 7 days   Lab Units 07/31/24  0502 07/31/24  0121 07/30/24  2202 07/24/24  1939   LACTIC ACID mmol/L 1.9 2.4* 4.6* 1.5       Lines/Drains:  Invasive Devices       Peripheral Intravenous Line  Duration             Peripheral IV 07/30/24 Left;Proximal;Ventral (anterior) Antecubital 1 day    Peripheral IV 07/31/24 Right Antecubital <1 day                      Telemetry:  Telemetry Orders (From admission, onward)               24 Hour Telemetry Monitoring  Continuous x 24 Hours (Telem)        Question:  Reason for 24 Hour Telemetry  Answer:  Arrhythmias requiring acute medical intervention / PPM or ICD malfunction                                  Imaging: Reviewed radiology reports from this admission including: CT head    Recent Cultures (last 7 days):   Results from last 7 days   Lab Units 07/29/24  1805   GRAM STAIN RESULT  Rare Polys*  Rare Gram negative rods*        Last 24 Hours Medication List:   Current Facility-Administered Medications   Medication Dose Route Frequency Provider Last Rate    allopurinol  100 mg Oral Daily Costa Omer DPM      aspirin  81 mg Oral Daily Costa Omer DPM      cefepime  1,000 mg Intravenous Q24H Soraida Tobias PA-C 1,000 mg (07/31/24 0155)    escitalopram  20 mg Oral Daily Costa Omer DPM      furosemide  20 mg Oral Q6H Cullen Reinoso MD      HYDROmorphone  0.2 mg Intravenous Q6H PRN Costa Omer DPM      insulin detemir  10 Units Subcutaneous Daily Cullen Reinoso MD      insulin lispro  1-5 Units Subcutaneous TID AC Costa Omer DPM      lactulose  20 g Oral TID Soraida Tobias PA-C      lidocaine  1 patch Topical Daily Costa Omer DPM      metroNIDAZOLE  500 mg Oral Q8H JAVIER Costa Omer DPM      ondansetron  4 mg Intravenous Q6H PRN Costa Omer DPM      oxyCODONE  5 mg Oral Q6H PRN Costa Omer DPM      sodium bicarbonate  650 mg Oral BID after meals Costa Omer DPM      ticagrelor  90 mg Oral Q12H JAVIER Costa Omer DPM      torsemide  10 mg Oral Daily Costa Omer DPM          Today, Patient Was Seen By: Cullen Reinoso MD    **Please Note: This note may have been constructed using a voice recognition system.**

## 2024-07-31 NOTE — PROGRESS NOTES
"[facility] Podiatry - Progress Note  Patient: Anamaria Parnell 79 y.o. female   MRN: [id number]  PCP: Ritchie Lawton MD  Unit/Bed#: [address] [place]-01 Encounter: [id number]  Date Of Visit: 24    ASSESSMENT:    Anamaria Parnell is a 79 y.o. female with:    Right hallux gangrene, Davidson grade 4  - s/p right partial first ray amputation (DOS: 24)  T2DM (HbA1c - 5.9%)  CKD Stage 4  Aortoiliac occlusive disease  PAD    PLAN:    POD2 s/p right partial first ray amputation, packed open  Will continue to monitor surgical site, patient may require more definitive procedure for closure pending medical stability and vascular surgery intervention  Continue ABX per ID recommendations, pending final intraoperative cultures.  Follow up vascular surgery recommendations, patient is tentatively undergoing RLE fem-pop bypass on  pending medical stability  Continue local wound care, podiatry to do dressing changes  Elevation on green foam wedges or pillows when non-ambulatory.  Rest of care per primary team.    Weight bearing status: Nonweightbearing to right lower extremity    SUBJECTIVE:     The patient was seen, evaluated, and assessed at bedside today. The patient was awake and in no acute distress.The patient reports she is quite tired. Patient denies N/V/F/chills/SOB/CP.    OBJECTIVE:     Vitals:   /61   Pulse 92   Temp 97.8 °F (36.6 °C)   Resp 16   Ht 4' 10\" (1.473 m)   Wt 73.1 kg (161 lb 2.5 oz)   LMP  (LMP Unknown)   SpO2 96%   BMI 33.68 kg/m²     Temp (24hrs), Av.5 °F (36.4 °C), Min:95.8 °F (35.4 °C), Max:98.2 °F (36.8 °C)      Physical Exam:     General:  Alert, cooperative, and in no distress.  Lower extremity exam:  Cardiovascular status at baseline.  Neurological status at baseline.  S/p right partial first ray amputation No calf tenderness noted.     Lower extremity wound(s) as noted below:  Wound #: 1  Location: right partial first ray amputation  Length 5cm: Width 3cm: Depth 2cm: " "  Deepest Tissue Noted in Base: bone  Probe to Bone: Yes  Peripheral Skin Description: Attached  Granulation: 40% Fibrotic Tissue: 60% Necrotic Tissue: 0%   Drainage Amount: minimal, serosanguinous  Signs of Infection: No    Clinical Images 07/31/24:      Additional Data:     Labs:    Results from last 7 days   Lab Units 07/31/24  0502 07/30/24  1808 07/30/24  0449   WBC Thousand/uL 31.56*   < > 37.50*   HEMOGLOBIN g/dL 7.4*   < > 6.4*   HEMATOCRIT % 22.5*   < > 20.6*   PLATELETS Thousands/uL 318   < > 365   SEGS PCT %  --   --  82*   LYMPHO PCT % 3*  --  3*   MONO PCT % 5  --  4   EOS PCT % 0  --  0    < > = values in this interval not displayed.     Results from last 7 days   Lab Units 07/31/24  0502 07/24/24  1939 07/24/24  1551   POTASSIUM mmol/L 4.7   < >  --    CHLORIDE mmol/L 108   < >  --    CO2 mmol/L 24   < >  --    CO2, I-STAT mmol/L  --   --  18*   BUN mg/dL 77*   < >  --    CREATININE mg/dL 2.37*   < >  --    CALCIUM mg/dL 8.2*   < >  --    ALK PHOS U/L 105*   < >  --    ALT U/L 516*   < >  --    AST U/L 2,280*   < >  --    GLUCOSE, ISTAT mg/dl  --   --  179*    < > = values in this interval not displayed.     Results from last 7 days   Lab Units 07/30/24  2202   INR  2.10*       * I Have Reviewed All Lab Data Listed Above.    Recent Cultures (last 7 days):     Results from last 7 days   Lab Units 07/29/24  1805   GRAM STAIN RESULT  Rare Polys*  Rare Gram negative rods*     Results from last 7 days   Lab Units 07/29/24  1805   ANAEROBIC CULTURE  Culture results to follow.       Imaging: I have personally reviewed pertinent films in PACS  EKG, Pathology, and Other Studies: I have personally reviewed pertinent reports.      ** Please Note: Portions of the record may have been created with voice recognition software. Occasional wrong word or \"sound a like\" substitutions may have occurred due to the inherent limitations of voice recognition software. Read the chart carefully and recognize, using context, " where substitutions have occurred. **

## 2024-07-31 NOTE — PHYSICAL THERAPY NOTE
PHYSICAL THERAPY NOTE          Patient Name: Anamaria Parnell  Today's Date: 7/31/2024 07/31/24 1459   PT Last Visit   PT Visit Date 07/31/24   Note Type   Note Type Treatment   Pain Assessment   Pain Assessment Tool FLACC   Pain Location/Orientation Location: [facility] Pain Intervention(s) Emotional support;Rest;Repositioned   Pain Rating: FLACC (Rest) - Face 0   Pain Rating: FLACC (Rest) - Legs 0   Pain Rating: FLACC (Rest) - Activity 0   Pain Rating: FLACC (Rest) - Cry 0   Pain Rating: FLACC (Rest) - Consolability 0   Score: FLACC (Rest) 0   Pain Rating: FLACC (Activity) - Face 1   Pain Rating: FLACC (Activity) - Legs 0   Pain Rating: FLACC (Activity) - Activity 0   Pain Rating: FLACC (Activity) - Cry 1   Pain Rating: FLACC (Activity) - Consolability 1   Score: FLACC (Activity) 3   Restrictions/Precautions   RLE Weight Bearing Per Order NWB   Other Precautions Cognitive;Chair Alarm;Bed Alarm;Multiple lines;WBS;Fall Risk;Pain   General   Chart Reviewed Yes   Response to Previous Treatment Patient unable to report, no changes reported from family or staff   Cognition   Overall Cognitive Status Impaired   Arousal/Participation Cooperative   Attention Difficulty attending to directions   Orientation Level Oriented to person;Disoriented to place;Disoriented to time;Disoriented to situation;Oriented to place  (general to place)   Memory Decreased recall of precautions;Decreased recall of recent events;Decreased short term memory   Following Commands Follows one step commands inconsistently   Comments continues to present w acute confusion. difficulty maintaining attention to task.   Bed Mobility   Supine to Sit 3  Moderate assistance   Additional items Assist x 1;HOB elevated;Bedrails;Increased time required;LE management;Verbal cues;Other  (assist for BLE, some trunk support)   Transfers   Sit to Stand 3  Moderate assistance   Additional items Assist x 2;Increased  time required;Verbal cues;Other  (RW)   Stand to Sit 3  Moderate assistance   Additional items Assist x 2;Increased time required;Verbal cues;Other  (RW)   Sliding Board transfer 1  Dependent   Additional items Assist x 2;Increased time required;Verbal cues   Additional Comments attempted sit<>stand from EOB. slide board EOB>recliner   Ambulation/Elevation   Gait pattern Not appropriate  (given non compliance w WBS in standing)   Balance   Static Sitting Fair -   Static Standing Poor -  (w RW, inconsistently maintaining nwb)   Endurance Deficit   Endurance Deficit Yes   Endurance Deficit Description general deconditioning   Activity Tolerance   Activity Tolerance Treatment limited secondary to medical complications (Comment)  (wbs, cognition)   Medical Staff Made Aware Kristen OT   Nurse Made Aware Jailene RN   Assessment   Prognosis Fair   Problem List Decreased strength;Impaired balance;Decreased mobility;Decreased cognition;Impaired judgement;Decreased safety awareness;Pain  (wbs)   Assessment Anamaria demonstrates progress towards goals including decreased assist for bed mobility and sit<>stand transfers, progression to OOB mobility, improved am-pac. Continues to present w deficits of cognition, strength, balance, compliance to wbs. Require constant cuing during session for direction, attention to task/ redirection, safety, reinforcement of wbs and technique; noted inconsistent follow through dt acute confusion. Continues to require Ax2 for OOB transfers secondary to weakness, ?effort dt confusion. Poor trunk control at times dt confusion and impulsive behaviors. Noted poor balance during slide board transf dt minimal use of LLE and BUE for support. During attempts at standing transf w RW, pt unable to maintain wbs. Could intermittently achieve NWB RLE during static standing however inconsistent and ultimately unable to maintain balance in unilateral stance. Would continue to benefit from therapy services to help  facilitate recovery and provide mobility training while nwb.   Barriers to Discharge Decreased caregiver support;Inaccessible home environment   Barriers to Discharge Comments son reports pt has to be indep to return home as he cannot provide complete care for her   Goals   Patient Goals none stated dt cognition   STG Expiration Date 08/09/24   Short Term Goal #1 1) Pt will perform bed mobility with mod Ax1 demonstrating appropriate technique 100% of the time in order to improve function. 2) Perform all transfers with mod Ax1 demonstrating safe and appropriate technique 100% of the time in order to improve ability to negotiate safely in home environment. 3) Pt will increase standing tolerance to at least 1 minute in order to demonstrate readiness for ambulation activity. 4) Pt will tolerate amb at a distance of >50' w/ appropriate AD and mod Ax2 in order to demonstrate ability to complete household distances. 5) Pt will self propel WC  for a distance of >100' w/ S in order to demonstrate improvement/independence in functional mobility. 6) Pt will maintain % of the time in order to demonstrate understanding and carryover of all education. 7) Pt will increase activity tolerance to at least 30 minutes to demonstrate improved cardiovascular endurance and ability to participate in tasks as required. 8) Pt/caregiver education as appropriate and requested   PT Treatment Day 1   Plan   Treatment/Interventions LE strengthening/ROM;Functional transfer training;Therapeutic exercise;Cognitive reorientation;Patient/family training;Equipment eval/education;Bed mobility;Compensatory technique education;Continued evaluation;Spoke to nursing;OT   Progress Slow progress, cognitive deficits   PT Frequency 3-5x/wk   Discharge Recommendation   Rehab Resource Intensity Level, PT II (Moderate Resource Intensity)   AM-PAC Basic Mobility Inpatient   Turning in Flat Bed Without Bedrails 2   Lying on Back to Sitting on Edge of Flat Bed  Without Bedrails 2   Moving Bed to Chair 1   Standing Up From Chair Using Arms 1   Walk in Room 1   Climb 3-5 Stairs With Railing 1   Basic Mobility Inpatient Raw Score 8   Turning Head Towards Sound 3   Follow Simple Instructions 2   Low Function Basic Mobility Raw Score  13   Low Function Basic Mobility Standardized Score  20.14   [facility] Highest Level Of Mobility   -[facility] Goal 3: Sit at edge of bed   -HLM Achieved 4: Move to chair/commode   Education   Education Provided Mobility training;Assistive device   Patient Reinforcement needed   End of Consult   Patient Position at End of Consult Bedside chair     Alicia Abbott, PT

## 2024-07-31 NOTE — ASSESSMENT & PLAN NOTE
80 yo female ex-smoker w/ a hx of obesity, CKD IV, DM II w/ peripheral neuropathy, HLD, HTN, CAD, L MCA/PCA CVA S/P L TCAR 9/7/23 ([place]), FÉLIX, mesenteric artery stenosis and aortoiliac occlusive disease w/ PAD and chronic R hallux dry gangrene presented to [facility] on 7/16/24 w/ worsening R great toe pain and R hallux gangrene. Pt seen in consult by nephrology for RICARDO on CKD. Pt was transferred to [facility] on 7/19 w/ plans for R femoral endarterectomy w/ retrograde stenting and RLE bypass. Pt seen in consult by podiatry for R hallux dry gangrene. Xray R foot (+) OM. Podiatry plans local wound care w/ re-eval for R hallux amputation after revascularization.     Vascular surgery consulted for revascularization options for known AIOD/PAD w/ CLTI and R hallux dry gangrene. Pt follows w/ vascular surgery for PAD and chronic limb ischemia; last seen in the office on 6/4/24. Imaging shows extensive AIOD and peripheral arterial disease with occlusion of the R iliac system, nearly occlusive R CFA, and occlusion of popliteal artery with recon of the below knee popliteal and tibials. R LISA: 0.11/-/-.      Pt is S/P B/L CFAE, L JACI/EIA balloon angio and shockwave lithotripsy w/ insertion of a L EIA drug-coated stent and L JACI VBX stent, L to R PTFE fem-fem bypass, and B/L groin vac placement on 7/24. Pt received 3U PRBC and 1U FFP intra-op. Plan to return to OR for R femoral to BK-pop bypass; tentatively scheduled for 8/2/24.     Leukocytosis continued to increase w/ worsening R toe gangrene. Pt underwent R hallux amputation w/ partial 1st ray resection on 7/29 for source control. Pt was seen in consult by ID and continues on IV cefepime and flagyl. 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 Plan for possible PPM after resolution of infection and  revascularization is complete. Pt received 1U PRBC on 7/30 for Hgb 6.6. She developed encephalopathy w/ worsening LFTs and lactic acidosis attributed to shock liver vs fulminant hepatitis. Consults to GI and geriatrics pending. Troponin obtained as part of encephalopathy workup was initially 4,677 w/ EKG unchanged from prior, per cardiology.    Diagnostics:  -7/30/24 CT head: Chronic left PCA territory infarct. No evidence of acute infarct, intracranial hemorrhage or mass.  -7/29/24 LEAD: Right: Evidence of patent endarterectomy with diffuse disease noted throughout the femoral-popliteal arteries without significant focal stenosis. LISA: 0.53/48/22. Left: Evidence of patent endarterectomy with diffuse disease noted throughout the femoral-popliteal arteries without significant focal stenosis. L LISA: 0.57/10/43  -7/29/24 Xray R foot: Increasing volume of air within the soft tissues of the first toe in this patient with provided history of gangrene. The previously described cortical destruction along the ventral aspect of the first distal phalanx is not well visualized on the current examination, likely attributed to differences in patient positioning.  -7/22/24 Xray R foot: Cortical destruction along the ventral aspect of the first distal phalanx worrisome for acute osteomyelitis. Large air within the overlying soft tissues.   -7/21/24 Echo: Left ventricular cavity size is normal. Wall thickness is severely increased. The left ventricular ejection fraction is 41% by biplane measurement. Systolic function is mildly reduced. Global longitudinal strain is reduced at -10% with moderate to severe strain reduction in the ED mid to basal anteroseptal septal and inferior walls.. Unable to grade diastolic dysfunction given the severe degree of mitral annular calcification.   -7/18/24 LE vein mapping: Right: The great saphenous vein is patent  from the groin to the ankle. The intraluminal diameter measurements range from 2.2mm  to 6.4mm throughout. Left: The great saphenous vein is patent from the groin to the ankle. The intraluminal diameter measurements range from 1.3mm to 6.8mm throughout.  -7/18/24 CTA abd w/ runoff: Visualized descending thoracic and suprarenal abdominal aorta demonstrate marked soft plaque with multifocal regions of plaque ulceration. A point of relative stenosis within the infrarenal abdominal aorta measures up to 7 mm in diameter. Bilateral multifocal severe stenosis within the external iliac and common femoral arteries. Bilateral long segment SFA occlusion extending from the ostia to the level of the P1 popliteal artery. Three-vessel runoff on the right, the posterior tibial artery is dominant. Two-vessel runoff on the left, the peroneal artery is dominant. Segmental visualization of the posterior tibial artery. Interval increase in size of an exophytic lesion arising medially from the stomach, again consistent with gastrointestinal stromal tumor. New dilation of the pancreatic duct within the pancreatic head, no obvious pancreatic/ampulla lesion identified.  -7/16/24 LEAD: Right: Severe diffuse disease noted throughout the femoral-popliteal arteries with high grade stenosis vs occlusion of the proximal-distal superficial femoral artery with reconstitution to the popliteal artery. Evidence suggestive of TIb/Peroneal occlusive disease. There is a high grade stenosis vs occlusion of the distal anterior tibial artery. R LISA: 0.11/-/-. Left: Severe diffuse disease noted throughout the femoral-popliteal arteries with high grade stenosis vs occlusion of the proximal-mid superficial femoral artery with reconstitution in the distal SFA. Evidence suggestive of TIb/Peroneal occlusive disease. There is high grade stenosis vs. occlusion of the entire posterior tibial artery. L LISA: 0.31/20/19.     Plan:   -Advanced calcific atherosclerotic AIOD/PAD w/ CTLI and R hallux dry gangrene (+) OM   -S/P B/L CFAE, L JACI/EIA balloon  angio and shockwave lithotripsy w/ insertion of a L EIA drug-coated stent and L JACI VBX stent, L to R PTFE fem-fem bypass, and B/L groin vac placement on 7/24 (POD #7)  -B/L groin wound vacs intact w/ adequate suction; continue wound vac changes qMWF  -B/L DP/PT signals  -Plan for femoral to BK pop bypass in OR; tentatively scheduled for Fri (8/2) pending medical stability  -S/P R hallux amputation w/ partial 1st ray resection by podiatry on 7/29  -ID following for ABX management, input appreciated; surgical cultures pending  -Cardiology following w/ plans for eventual consideration for PPM once infection resolved and revascularization complete   -Anemia continues S/P 3U PRBC and 1U FFP intra-op; 1U PRBC on 7/30. Hgb 7.4 today.  -Continue to monitor Hgb and transfuse < 7.0 per primary team; receiving 1U PRBC today (7/31)  -Continue ASA, brilinta and pravastatin for medical management  -GI consult pending for elevated LFTs  -Geriatrics consult pending for encephalopathy  -Continue medical management per primary team  -PT/OT follownig  -Will discuss w/ Dr. Reed

## 2024-07-31 NOTE — OCCUPATIONAL THERAPY NOTE
Occupational Therapy Progress Note     Patient Name: Anamaria Parnell  Today's Date: 7/31/2024  Problem List  Principal Problem:    Aortoiliac occlusive disease (HCC)  Active Problems:    Basilar artery stenosis    CVA (cerebral vascular accident) (HCC)    Acute renal failure (ARF) (HCC)    Depression, recurrent (HCC)    Type 2 diabetes mellitus, without long-term current use of insulin (HCC)    Primary hypertension    Cellulitis of toe of right foot    Coronary artery disease of native artery of native heart with stable angina pectoris (HCC)    Paroxysmal atrial fibrillation (HCC)    Chronic HFrEF (heart failure with reduced ejection fraction) (HCC)    Encephalopathy    Elevated troponin    Acute liver failure without hepatic coma    Acute blood loss anemia          07/31/24 1436   OT Last Visit   OT Visit Date 07/31/24   Note Type   Note Type Treatment   Pain Assessment   Pain Assessment Tool FLACC   Pain Location/Orientation Location: Buttocks   Pain Rating: FLACC (Rest) - Face 0   Pain Rating: FLACC (Rest) - Legs 0   Pain Rating: FLACC (Rest) - Activity 0   Pain Rating: FLACC (Rest) - Cry 0   Pain Rating: FLACC (Rest) - Consolability 0   Score: FLACC (Rest) 0   Pain Rating: FLACC (Activity) - Face 1   Pain Rating: FLACC (Activity) - Legs 1   Pain Rating: FLACC (Activity) - Activity 0   Pain Rating: FLACC (Activity) - Cry 0   Pain Rating: FLACC (Activity) - Consolability 0   Score: FLACC (Activity) 2   Restrictions/Precautions   Weight Bearing Precautions Per Order Yes   RLE Weight Bearing Per Order NWB   Other Precautions Cognitive;Chair Alarm;Bed Alarm;Multiple lines;WBS;Fall Risk;Pain   ADL   LB Dressing Assistance 2  Maximal Assistance   LB Dressing Deficit Don/doff L sock   Bed Mobility   Supine to Sit 3  Moderate assistance   Additional items Assist x 1;HOB elevated;Bedrails;Increased time required;Verbal cues;LE management   Additional Comments left seated up in chair following session. call light in  reach.   Transfers   Sit to Stand 3  Moderate assistance   Additional items Assist x 2;Increased time required;Verbal cues   Stand to Sit 3  Moderate assistance   Additional items Assist x 2;Increased time required;Verbal cues   Sliding Board transfer 1  Dependent   Additional items Assist x 2;Increased time required;Verbal cues   Additional Comments attempted sit<>stand - unable to maintain NWB status.   Therapeutic Exercise - ROM   UE-ROM Yes   ROM- Right Upper Extremities   R Shoulder AROM;Flexion;ABduction;Extension;Horizontal ABduction   R Elbow AROM;Elbow flexion;Elbow extension   R Position Seated   R Weight/Reps/Sets 1x 10   ROM - Left Upper Extremities    L Shoulder AROM;Flexion;ABduction;Extension;Horizontal ABduction   L Elbow AROM;Elbow flexion;Elbow extension   L Position Seated   L Weight/Reps/Sets 1x10   Cognition   Overall Cognitive Status Impaired   Arousal/Participation Cooperative   Attention Attends with cues to redirect   Orientation Level Disoriented to place;Disoriented to time;Disoriented to situation;Oriented to person   Memory Decreased short term memory;Decreased recall of recent events;Decreased recall of precautions   Following Commands Follows one step commands with increased time or repetition   Activity Tolerance   Activity Tolerance Patient tolerated treatment well   Medical Staff Made Aware PT Alicia. RN   Assessment   Assessment Pt seen for skilled OT tx this date. Tx focused on improving strength, activity tolerance and balance, safety awareness, cognition to increase independence with self care tasks. Pt tolerated session fair. Pt was limited by confusion pain, weakness and WBS. Pt performed LB dressing / bathing max for socks, bed mobility supine> sit modAx1 , transfers modAx2, SB transfers depx2. Pt demonstrated fair - sitting balance during functional tasks.Pt required moderate verbal cuing during session to safely complete tasks. Pt completed 1x10 of various BUE exercises  to increase strength. Performed while seated. Tolerated fair. Current OT DC recommendations for pt is level2 resources.   Plan   Treatment Interventions ADL retraining;Functional transfer training;UE strengthening/ROM;Endurance training;Patient/family training;Equipment evaluation/education;Compensatory technique education;Continued evaluation;Energy conservation   Goal Expiration Date 08/14/24   OT Treatment Day 4   OT Frequency 3-5x/wk   Discharge Recommendation   Rehab Resource Intensity Level, OT II (Moderate Resource Intensity)   Additional Comments  The patient's raw score on the AM-PAC Daily Activity Inpatient Short Form is 13. A raw score of less than 19 suggests the patient may benefit from discharge to post-acute rehabilitation services. Please refer to the recommendation of the Occupational Therapist for safe discharge planning.   AM-PAC Daily Activity Inpatient   Lower Body Dressing 1   Bathing 2   Toileting 1   Upper Body Dressing 3   Grooming 3   Eating 3   Daily Activity Raw Score 13   Daily Activity Standardized Score (Calc for Raw Score >=11) 32.03   AM-PAC Applied Cognition Inpatient   Following a Speech/Presentation 3   Understanding Ordinary Conversation 3   Taking Medications 1   Remembering Where Things Are Placed or Put Away 2   Remembering List of 4-5 Errands 2   Taking Care of Complicated Tasks 2   Applied Cognition Raw Score 13   Applied Cognition Standardized Score 30.46   Anna Wood, OT

## 2024-07-31 NOTE — PROGRESS NOTES
"Progress Note - Anamaria Parnell 79 y.o. female MRN: [id number]    Unit/Bed#: [place] [address] -01 Encounter: [id number]      Assessment:  79-year-old female with extensive PAD and aortic disease, R CLTI and R great toe dry gangrene, now s/p b/l fem endarterectomy w L to R fem-fem PTFE bypass and retrograde L JACI, L EIA stenting, b/l groin VAC placement on 7/24.  7/29 right hallux amputation. 7/31 echo showed EF of 30%. Liver U/S showed common duct dilation of 8mm.    Plan:  -Tentative return OR for right femoral to BK- pop bypass for 8/2  -Wound VAC changes Monday, Wednesday,  Friday.  -Continue neurovascular exams, bilateral DP/PT signals  -Follow-up ID for antibiotic management, surgical cultures pending.  -Appreciate cardiology recommendations  -Continue to monitor for acute blood loss anemia  -Continue ASA, Brilinta and pravastatin  -Follow-up GI for elevated LFTs  -Follow-up geriatrics consult for encephalopathy  -Appreciate PT/OT  -Appreciate cardiology recommendations  -Remainder of care per primary team    Subjective:   Patient doing better this morning.  No acute overnight events.  Patient states she has no abdominal pain or lower extremity pain.  Patient does endorse having some sacral pain that is being managed by wound care.  Patient denies any nausea, vomiting, fevers or chills.  Patient's mentation is better than yesterday however still alert to self but not time or place.  Patient mentions wanting to go home.    Objective:     Visit Vitals  /73   Pulse 86   Temp 97.5 °F (36.4 °C) (Temporal)   Resp 14   Ht 4' 10\" (1.473 m)   Wt 73.1 kg (161 lb 2.5 oz)   LMP  (LMP Unknown)   SpO2 97%   BMI 33.68 kg/m²   OB Status Postmenopausal   Smoking Status Former   BSA 1.66 m²          Intake/Output Summary (Last 24 hours) at 7/31/2024 1926  Last data filed at 7/31/2024 1511  Gross per 24 hour   Intake 1090 ml   Output 275 ml   Net 815 ml   VAC drain output: 25 cc yellow serous   Recent Labs     07/31/24  0502 " 07/31/24  1715 08/01/24  0440   WBC 31.56*  --  33.34*   HGB 7.4* 10.5* 10.5*     --  303   SODIUM 144  --  143   K 4.7  --  4.1     --  107   CO2 24  --  26   BUN 77*  --  77*   CREATININE 2.37*  --  2.19*   GLUC 283*  --  231*   CALCIUM 8.2*  --  8.6   MG 2.4  --  2.3   PHOS 5.0*  --  4.3*   AST 2,280*  --  814*   *  --  330*   ALKPHOS 105*  --  139*   TBILI 0.89  --  1.32*      Phosphorus: 4.3  Magnesium 2.3    Physical Exam: General appearance: alert and oriented, in no acute distress  Lungs: clear to auscultation bilaterally  Heart: regular rate and rhythm, S1, S2 normal, no murmur, click, rub or gallop  Abdomen: soft, non-tender; bowel sounds normal; no masses,  no organomegaly  Pulses: 2+ and symmetric  Skin: Skin color, texture, turgor normal. No rashes or lesions   Right hallux amputation site: Ace wrap is clean dry and intact without any surrounding saturation.  DP: Right: non-palpable (covered with dressing) Left: doppler signal  PT: Right: doppler signal Left: doppler signal  Invasive Devices       Peripheral Intravenous Line  Duration             Peripheral IV 07/30/24 Left;Proximal;Ventral (anterior) Antecubital 1 day    Peripheral IV 07/31/24 Right Antecubital <1 day                    Lab, Imaging and other studies: I have personally reviewed pertinent reports.    VTE Pharmacologic Prophylaxis: Sequential compression device (Venodyne)   VTE Mechanical Prophylaxis: sequential compression device

## 2024-07-31 NOTE — ASSESSMENT & PLAN NOTE
Patient noted to have increase in LFTs prior to tonight's labs, likely multifactorial and secondary to acute illness and cephalosporins  As part of workup for acute encephalopathy, LFTs tonight noted to be highly elevated  Ratio of AST/ALT concerning for shock liver given anemia this morning and concurrent elevated troponin  Has been transfused, monitor LFTs on a.m. labs  Ammonia pending; consult to gastroenterology

## 2024-07-31 NOTE — ASSESSMENT & PLAN NOTE
"Converted spontaneously before she was placed on telemetry. ECG reviewed with cardiology.  Per cardiology: \"Given her anemia, DAPT for recent surgery, recent surgery and low A-fib burden, will hold off starting anticoagulation for now.\"     7/29-update: Patient now with tachybradycardia syndrome; cardiology recommending transfer to [place] sometime following Friday's vascular surgery procedure for possible implantation of pacemaker.  "

## 2024-07-31 NOTE — CONSULTS
Consultation - Geriatrics   Anamaria Parnell 79 y.o. female MRN: [id number]  Unit/Bed#: [place] [id number] -[id number] Encounter: [id number]      Assessment/Plan    Acute Encephalopathy  Presented to the hospital for right great toe gangrene   Baseline mentation is alert and oriented x 4  Patient was noted to have altered mental status yesterday   Stroke workup was negative   Current cause considerations   Suspected to be multifactorial secondary to right hallux gangrene status post right partial first ray amputation, anesthesia, acute pain, acute liver failure without hepatic coma, elevated troponins, RICARDO,acute blood loss anemia,  sleep disturbances, vision impairment, and prolonged hospitalization   Patient with lactic acidosis last night which has since resolved   UA negative for UTI on admission   Patient is noted to have leukocytosis on labs today at 31.56  ID is currently consulted and following   Patient is alert and oriented only to person on exam today but she did provide me with a completely accurate history about her baseline (I did review all information with her son and he confirmed the information provided was correct)  She does appear to have some word finding difficulties at times and appears to have the most difficult with short term memory here   Identify and treat reversible causes of confusion including infection, dehydration, electrolyte imbalance, anemia, hypoxia, urinary retention, constipation, pain, and sleep disturbance  Maintain delirium precautions   Provide redirection, reorientation, and distraction techniques  Maintain fall and safety precautions   Assist with ADLs/IADLs  Avoid deliriogenic medications such as tramadol, benzodiazepines, anticholinergics, benadryl  Treat pain using geriatric pain protocol   Encourage oral hydration and nutrition   Monitor for constipation and urinary retention   Implement sleep hygiene and limit night time interuptions   Maintain sleep-wake cycle   Encourage early  and frequent mobilization   Most recent EKG on 7/29/2024 revealed a QTc interval of 525  Medication for acute agitation and behaviors at this time is extremely limited   Would avoid antipsychotics secondary to prolonged QTc interval   Would also avoid Depakote secondary to acute liver failure   While not typically recommended in geriatric patients can consider low dose lorazepam 0.25 mg if needed for acute agitation and behaviors as other options are not feasible at this time   Redirect and reorient as needed   Keep mentally, physically, and socially active    Cognitive Screening/Dementia  Per chart review, it appears that the patient has a piror history of memory loss, however, I do not see any workup for this diagnosis and I don't see this as a documented history in [facility]   Patients son notes the patient is alert and oriented x 4 at baseline and has no difficulties or issues with her memory  Patient is oriented only to person and is now with acute encephalopathy as discussed above   Most recent TSH on 7/27/2024 noted to be 2.309  Most recent vitamin B 12 level on 2/25/2019 noted to be 502  Consider checking on routine labs   CT of the head on 7/30/2024 revealed chronic left PCA infarct and microangiopathic changes   No MoCA or cognitive testing noted in epic  Maintain delirium precautions as discussed above   Redirect and reorient as needed  Keep physically, mentally, and socially active     Deconditioning   Baseline function: independent with ADLs and needs assistance with some IADLs  Patient is at increased risk for deconditioning secondary to right hallux gangrene status post right partial first ray amputation, anesthesia, acute pain, acute liver failure without hepatic coma, elevated troponins, RICARDO, weakness, gait dysfunction, and prolonged hospitalization   Continue to optimize diet, hydration, and mobility for healing   Stage IV Chronic Kidney Disease   Baseline creatinine appears to be 1.2-1.8  Patient now  with RICARDO with creatinine up to 2.37 on labs today   Patient does follow with nephrology in the outpatient setting   Nephrology had been consulted but has since signed off   Avoid nephrotoxic medication and renal dose medication   Keep hydrated   PO intake   Patient notes she has a decreased appetite   She notes that she used to have a very good appetite when she was on insulin but since being transitioned over the pills her appetite has decreased (her son confirmed this)  She does drink the carnation breakfast drinks at home   She is receiving glucerna with meals here   Encourage oral hydration and nutrition   Congestive heart failure   Most recent echo on 7/21/2024 revealed an EF of 41%  Patient does not appear to be on any diuretics at baseline   She is currently on torsemide 10 mg daily   Cardiology is consulted and following   Recommend low sodium diet   Continue to monitor weights and I&O  Management per cardiology   Type II Diabetes   Hemoglobin A1C on 7/9/2024 noted to be 5.9 which is pretty tightly controlled for patients age   Patient had been on insulin in the past but once her A1C was improved she was transitioned over to tradjenta which she and her son note have affected her appetite   Patient is currently on blood sugar checks and while blood sugars have been elevated they are acceptable   Her current regimen is Humalog SSI with meals and Levemir 10 units daily   Continue with insulin and diabetic diet   Avoid hypoglycemia   Acute blood loss anemia  Baseline hemoglobin appears to be 11-13  She has been primarily trending in the 7's for the past few days   She was noted to have a hemoglobin of 6.4 yesterday and received a unit of blood   Post-transfusion hemoglobin stable at 8.3  Hemoglobin on labs today noted to be 7.4  Patient is receiving another unit of blood today   Continue to monitor CBC   Transfuse for hemoglobin < 7   Monitor for signs and symptoms of infection, dehydration, DVT, and skin  breakdown    Frailty   Clinical Frail Scale: 5- Mildly Frail (baseline)  More evident slowing, needs help high order IADLs (transport, bills, medications)  Progressively impairs shopping and walking outside alone, meal prep and housework  Clinical Frail Scale: 6- Moderately Frail (current)  Need help with all outside activities  Need help with stairs and bathing  May need assistance with dressing  Most recent albumin on labs today noted to be 3.5  Consider nutrition consult  Encourage protein supplementation     Ambulatory Dysfunction/Falls  Patient has had no recent falls at home   She states she has both a roller walker and cane at home   Her son at the bedside today notes that she also has an electric scooter that she has been using due to pain   She will primarily use the cane when not using the scooter   PT/OT consulted to assist with strengthening/mobility and assist with discharge planning to appropriate level of care  Assess patient frequently for physical needs, encourage use of assistant devices as needed and directed by PT/OT  Identify cognitive and physical deficits and behaviors that affect risk of falls  Consider moving patient closer to nursing station to monitor more closely for impulsive behavior which may increase risk of falls  [place] fall and safety precautions   Educate patient/family on patient safety including physical limitations and importance of using call bell for assistance   Modify environment to reduce risk of injury including disconnecting from pole when not in use, ensuring adequate lighting in room and restroom, ensuring that path to restroom is clear and free of trip hazards  Out of bed as tolerated    Impaired Vision   Patient does have vision impairment   She does wear eyeglasses   Recommend use of corrective lenses at all appropriate times  Encourage adequate lighting and encourage use of assistance with ambulation  Keep personal belongings close to avoid reaching  Encourage  appropriate footwear at all times  Recommend large font for printed materials provided to patient    Impaired Hearing   Patient denies hearing impairment   Hearing impairment strongly correlated with depression, cognitive impairment, delirium and falls in the older adult  Use hearing aids or sound amplifier  Speak face to face  Use clear dictation and enunciation of words    Dentition/Appetite   Patient does not wear dentures   She notes her appetite is fair at baseline as discussed above   Ensure meal consistency is appropriate for all abilities   Consider nutrition consult   Continue aspiration precautions     Elimination   Patient is continent of bowel and bladder at baseline  She has been recently having incontinent episodes here thought it appears she was continent early this morning   Suspect this is related to encephalopathy   Patient has no history of constipation   Last documented bowel movement was today   She is receiving lactulose TID   Monitor for constipation and urinary retention     Insomnia   Patient notes she has some difficulty sleeping at home   She notes she sleeps in a recliner chair at baseline   She is on trazodone 50 mg at bedtime at baseline   Her son notes that he works overnights and does not get home until around 0300 and she often is up when he gets home and sleeps on his schedule   Trazodone is currently on hold   Nursing notes the patient has been sleeping a lot but the patient notes she is not sleeping well   Continue to monitor and consider restarting trazodone if patient continues to have difficulty with sleep   Can also consider starting melatonin 3 mg daily at bedtime if needed  First line is behavioral therapy   Avoid sedative hypnotics including benzodiazepines and benadryl  Encourage staying awake during the day   Encourage daytime activities and morning exercise   Decrease or eliminate daytime naps   Avoid caffeine especially during late afternoon and evening hours  Establish  a nighttime routine  Implement sleep hygiene and limit nighttime interruptions    Anxiety/Depression  Patient has a documented history of depression   She is on escitalopram at baseline   Mood appears stable on exam today   Continue current medication regimen   Continue supportive care     Right Hallux Gangrene   Patient noted to have gangrene of the right hallux   She was transferred from [facility] to [facility] for surgical vascular intervention   She is POD 2 from a right partial first ray amputation   Intraoperative cultures were obtained and are pending   Dressing changes being completed by podiatry   She remains on IV antibiotics secondary to leukocytosis   ID is consulted and following   Management per podiatry and ID     Aortoiliac Occlusive Disease   Patient with PAD with occlusion of multiple vessels   Patient was transferred from [facility] to [facility] for surgical vascular intervention   Patient is now POD 7 from a bilateral femoral endarterectomy with left to right femorofemoral PTFE bypass and retrograde left JACI, left EIA stenting, and bilateral groin VAC placement   Wound VAC remains intact   Current plan is for below the knee popliteal artery bypass on 8/2 pending medical stability   Management per vascular surgery     Acute Liver Failure without Hepatic Coma  Patient with elevated LFTs suspected to be related to acute illness and cephalosporins   There was some concern for shock yesterday as patient was anemic and had elevated troponins   Ammonia level elevated to 73 yesterday   Patient is now on lactulose and ammonia on labs this morning was 48  GI is now consulted and following   US has been ordered and is pending   Management per primary team and GI     Elevated Troponins   Patient noted to have elevated troponins on labs yesterday   She was noted to be tachycardic and anemic   She did receive a unit of blood yesterday and anemia improved   She is currently  being monitored on telemetry   Cardiology is consulted and following   Management per cardiology and primary team     Acute Pain due to Surgery   Patient is POD 2 from a right partial first ray amputation and POD 7 from a bilateral femoral endarterectomy with left to right femorofemoral PTFE bypass and retrograde left JACI, left EIA stenting, and bilateral groin VAC placement   She is currently denying pain on exam today   Would recommend treating pain using the geriatric pain protocol   Oxycodone 2.5 mg po Q 4 prn moderate pain  Oxycodone 5 mg po Q 4 prn severe pain   Dilaudid 0.2 mg IV Q 4 prn breakthrough pain   Would avoid tylenol and gabapentin at this time secondary to acute liver failure and RICARDO   Monitor for constipation   Continue nonpharmacological methods of pain management     Home Safety  Patient resides at home with her son   She had been doing a lot of the cooking and cleaning at home  She notes her son manages her finances and her medication   She no longer drives  Confirmed this information with her son at the bedside     Advanced Care Planning  Level 3 DNR     Home Medication Review   [facility] ([phone])  Tradjenta 5 mg daily   Escitalopram 20 mg daily   Allopurinol 100 mg daily   Amlodipine 5 mg daily   Trazodone 50 mg daily at bedtime   Pravastatin 80 mg daily   Brilinta 90 mg Q 12 hours   Cinacalcet 30 mg every other day   Labetalol 300 mg Q 12 hours   Losartan (last filled in April for 90 days and refill put back to stock on 7/30)    I have personally reviewed this medication list with the patients pharmacy listed above.       History of Present Illness   Physician Requesting Consult: Cullen Reinoso MD  Reason for Consult / Principal Problem: Confusion   Hx and PE limited by: Confusion at times     HPI: Anamaria Parnell is a 79 y.o. year old female who has hypertension, diabetes, CAD, CKD, CHF, atrial fibrillation, aortoiliac occlusive disease, bilateral artery stenosis,  history of CVA, and depression and initially presented to the hospital with toe pain that was progressively worsening.  She had been following with podiatry weekly and was told that her toe would likely self amputate.  She admitted that the pain and the swelling had been getting worse.  She noted that the pain initially was localized to the toe but had recently been moving up to the foot.  Dopplers were difficult to obtain but were completed and showed more chronic changes.  Vascular surgery surgery were consulted.  It was ultimately recommended that she be transferred to [facility] for extensive vascular intervention.  The patient is now POD 7 from a bilateral femoral endarterectomy with left to right femorofemoral PTFE bypass and retrograde left JACI, left EIA stenting, and bilateral groin VAC placement.  Vascular surgery is planning to take her back to the OR on 8/2 for a right-sided femoral to below-knee popliteal artery bypass pending medical clearance.  Podiatry was consulted at [place] as well.  He recommended surgical intervention for her gangrenous toe.  She is now POD 2 from a right partial first ray amputation.  Podiatry is managing dressing changes.  She is recommended for nonweightbearing status to the right lower extremity.  She may require a more definitive procedure for closure pending medical stability and vascular surgery intervention.  The patient had been alert and oriented for the majority of this hospitalization.  Postoperatively she was noted to be confused.  Yesterday there was concern for possible CVA versus TIA.  Stat CT of the head revealed no acute intracranial abnormalities.  She was noted to have an elevated troponin.  An EKG revealed no acute significant changes.  She was noted to be anemic and received 1 unit of blood yesterday.  Her liver function is now also elevated.  GI has been consulted to assist in management.  Patient was also noted to have increased leukocytosis.   ID has been consulted and is following.  Patient remains on cefepime and Flagyl at this time.  Geriatrics is being consulted for confusion.    The patient states she resides at home with her son.  She states that she had been independent with ADLs at baseline.  She notes that she was able to do some of the cooking and cleaning at home.  She notes that her son has been managing her finances and her medication.  She notes that she does not drive.  She notes no recent falls.  She states she does have a walker and a cane that she uses for ambulation at baseline.  She notes she does have some vision impairment and wears glasses.  She denies hearing impairment.  She denies denture use.  She states that her appetite is fair at baseline.  She states she is continent of bowel and bladder.  She notes no issues with voiding or constipation.  She notes that she sleeps in a recliner chair at home.    Care was coordinated with the patient's son at the bedside.  He confirmed all the information provided by the patient as mentioned above.  We did discuss hospital acquired delirium and some potential causes of confusion.  All questions and concerns were addressed at the time of the visit.    The patient was seen and evaluated today at the bedside for geriatric consult.  She is noted to be lying in bed comfortably in no acute distress.  She is alert and oriented only to person but she is able to provide accurate information about her baseline and home life.  She is currently denying pain.  She denies chest pain or shortness of breath.  She denies nausea and vomiting.  She notes that she is not sleeping very well.    Care was coordinated with the patient's nurse Jailene.  She notes that the patient was extremely confused yesterday and there was concern for stroke.  She notes the patient to be a bit improved today.  She states the patient has been sleeping quite a bit.  She notes the patient is continent at baseline but has been  incontinent recently which is not normal for her.    Care was also coordinated with Dr. Reinoso with internal medicine.        Inpatient consult to Gerontology  Consult performed by: GRACIELA Maldonado  Consult ordered by: Cullen Reinoso MD      Review of Systems   Constitutional:  Positive for activity change and appetite change. Negative for fatigue.   HENT:  Negative for dental problem and hearing loss.    Eyes:  Positive for visual disturbance (glasses).   Respiratory:  Negative for cough and shortness of breath.         Currently on supplemental oxygen   Cardiovascular:  Negative for chest pain and leg swelling.   Gastrointestinal:  Negative for abdominal distention, abdominal pain, constipation, diarrhea, nausea and vomiting.   Genitourinary:  Negative for difficulty urinating and dysuria.   Musculoskeletal:  Positive for gait problem. Negative for arthralgias.   Skin:  Negative for color change and pallor.   Neurological:  Positive for weakness. Negative for speech difficulty.   Psychiatric/Behavioral:  Positive for confusion. Negative for agitation and sleep disturbance. The patient is not nervous/anxious.        Historical Information   Past Medical History:   Diagnosis Date    Aphasia as late effect of cerebrovascular accident (CVA) 08/16/2023    Arthritis     Chronic kidney disease     Diabetes mellitus (HCC)     Embolism and 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 ([facility]) 01/20/2021    Endometriosis     High cholesterol     History of left common carotid artery stent placement 10/27/2023    Hypertension     Kidney disease, chronic, stage III (moderate, EGFR 30-59 ml/min) ([facility])     Lumbar disc herniation     Median arcuate ligament syndrome ([facility]) 11/05/2019    Neuropathy     Obesity (BMI 30-39.9) 12/04/2017    Obesity, morbid (HCC) 01/20/2021    Peripheral vascular disease ([facility])     Pneumonia     Shoulder injury     left    Spinal stenosis     Stroke ([facility]) 2015    Memory loss     Past Surgical  History:   Procedure Laterality Date    ARTERIOGRAM Bilateral 7/24/2024    Procedure: ARTERIOGRAM;  Surgeon: Jose Crum DO;  Location: [facility];  Service: Vascular    CARDIAC CATHETERIZATION      CAROTID STENT Left 9/7/2023    Procedure: L TCAR;  Surgeon: Nicole Gallo MD;  [facility]: [facility];  Service: Vascular    COLONOSCOPY      FL RETROGRADE PYELOGRAM  4/6/2020    FRACTURE SURGERY Right     ankle    IR CAROTID STENT  9/7/2023    IR LOWER EXTREMITY ANGIOGRAM  7/24/2024    OVARIAN CYST SURGERY      NY CYSTO BLADDER W/URETERAL CATHETERIZATION Bilateral 4/6/2020    Procedure: CYSTOSCOPY WITH RETROGRADE PYELOGRAM;  Surgeon: Robert Mitchell MD;  [facility]: [facility];  Service: Urology    NY CYSTO W/INSERT URETERAL STENT Right 4/6/2020    Procedure: INSERTION STENT URETERAL;  Surgeon: Robert Mitchell MD;  [facility];  Service: Urology    NY CYSTO W/REMOVAL OF TUMORS SMALL N/A 4/6/2020    Procedure: TRANSURETHRAL RESECTION OF BLADDER TUMOR (TURBT);  Surgeon: Robert Mitchell MD;  [facility];  Service: Urology    NY TEAEC W/WO PATCH GRAFT COMMON FEMORAL Bilateral 7/24/2024    Procedure: Bilateral femoral endarterectomies with patch angioplasty; Retrograde iliac intervention, shockwave, stent placement and angioplasty; femoral to femoral bypass with PTFE;  Surgeon: Jose Crum DO;  [facility];  Service: Vascular    ROTATOR CUFF REPAIR Left     TOE AMPUTATION Right 7/29/2024    Procedure: Rigth Hallux amputation, partial 1st ray resection;  Surgeon: Eddie Farooq DPM;  Location: [facility];  Service: Podiatry    TONSILLECTOMY       Social History   Social History     Substance and Sexual Activity   Alcohol Use Never     Social History     Substance and Sexual Activity   Drug Use Never     Social History     Tobacco Use   Smoking Status Former    Current packs/day: 0.00    Average packs/day: 2.0 packs/day for 54.6 years (109.1 ttl pk-yrs)    Types: Cigarettes     Start date: 1966    Quit date: 2020    Years since quittin.0   Smokeless Tobacco Never     Family History:   Family History   Problem Relation Age of Onset    Hypertension Mother     Heart disease Mother         Valvular    Hyperlipidemia Mother     Hypertension Father     Heart defect Father         Cardiomegaly    Stroke Sister         Cerebrovascular Accident    Arthritis Brother     Other Brother         Back Disorder       Meds/Allergies   Current meds:   Current Facility-Administered Medications   Medication Dose Route Frequency    allopurinol (ZYLOPRIM) tablet 100 mg  100 mg Oral Daily    aspirin chewable tablet 81 mg  81 mg Oral Daily    cefepime (MAXIPIME) 1,000 mg in dextrose 5 % 50 mL IVPB  1,000 mg Intravenous Q24H    escitalopram (LEXAPRO) tablet 20 mg  20 mg Oral Daily    HYDROmorphone HCl (DILAUDID) injection 0.2 mg  0.2 mg Intravenous Q6H PRN    insulin detemir (LEVEMIR) subcutaneous injection 10 Units  10 Units Subcutaneous Daily    insulin lispro (HumALOG/ADMELOG) 100 units/mL subcutaneous injection 1-5 Units  1-5 Units Subcutaneous TID AC    lactulose (CHRONULAC) oral solution 20 g  20 g Oral TID    lidocaine (LIDODERM) 5 % patch 1 patch  1 patch Topical Daily    metroNIDAZOLE (FLAGYL) tablet 500 mg  500 mg Oral Q8H JAVIER    ondansetron (ZOFRAN) injection 4 mg  4 mg Intravenous Q6H PRN    oxyCODONE (ROXICODONE) IR tablet 5 mg  5 mg Oral Q6H PRN    sodium bicarbonate tablet 650 mg  650 mg Oral BID after meals    ticagrelor (BRILINTA) tablet 90 mg  90 mg Oral Q12H JAVIER    torsemide (DEMADEX) tablet 10 mg  10 mg Oral Daily        Allergies   Allergen Reactions    Fenofibrate Other (See Comments)      blood in urine  hx  Kidney Failure    Colesevelam Other (See Comments)      leg pains    Colestipol Itching and Other (See Comments)      Swelling lower legs    Ezetimibe GI Intolerance    Statins Myalgia       Objective   Vitals: Blood pressure 102/62, pulse 94, temperature 97.6 °F (36.4 °C),  "temperature source Oral, resp. rate 16, height 4' 10\" (1.473 m), weight 73.1 kg (161 lb 2.5 oz), SpO2 96%.,Body mass index is 33.68 kg/m².      Physical Exam  Vitals and nursing note reviewed.   Constitutional:       General: She is not in acute distress.     Appearance: She is not ill-appearing.   HENT:      Head: Normocephalic.      Mouth/Throat:      Mouth: Mucous membranes are dry.   Eyes:      General: No scleral icterus.     Conjunctiva/sclera: Conjunctivae normal.   Cardiovascular:      Rate and Rhythm: Normal rate and regular rhythm.   Pulmonary:      Effort: Pulmonary effort is normal. No respiratory distress.      Comments: Currently on supplemental oxygen  Abdominal:      General: Bowel sounds are normal. There is no distension.      Palpations: Abdomen is soft.      Tenderness: There is no abdominal tenderness.   Musculoskeletal:         General: No swelling or tenderness.   Skin:     General: Skin is warm and dry.      Comments: Wound VAC to groin intact  Dressing to R foot clean, dry, and intact   Neurological:      Mental Status: She is alert. She is disoriented.      Motor: Weakness present.      Gait: Gait abnormal.      Comments: Oriented to person   Disoriented to place and time   Word finding difficulties at times   Confusion appears to be more short-term related (patient able to accurately discuss home life and baseline prior to admission)         Lab Results:   Results from last 7 days   Lab Units 07/31/24  0502   WBC Thousand/uL 31.56*   HEMOGLOBIN g/dL 7.4*   HEMATOCRIT % 22.5*   PLATELETS Thousands/uL 318        Results from last 7 days   Lab Units 07/31/24  0502 07/24/24  1939 07/24/24  1551   POTASSIUM mmol/L 4.7   < >  --    CHLORIDE mmol/L 108   < >  --    CO2 mmol/L 24   < >  --    CO2, I-STAT mmol/L  --   --  18*   BUN mg/dL 77*   < >  --    CREATININE mg/dL 2.37*   < >  --    CALCIUM mg/dL 8.2*   < >  --    ALK PHOS U/L 105*   < >  --    ALT U/L 516*   < >  --    AST U/L 2,280*   < >  " "--    GLUCOSE, ISTAT mg/dl  --   --  179*    < > = values in this interval not displayed.       Imaging Studies: I have personally reviewed pertinent reports.    EKG, Pathology, and Other Studies: I have personally reviewed pertinent reports.    VTE Prophylaxis: Brilinta/Aspirin     Code Status: Level 3 - DNAR and DNI      Please note:  Voice-recognition software may have been used in the preparation of this document.  Occasional wrong word or \"sound-alike\" substitutions may have occurred due to the inherent limitations of voice recognition software.  Interpretation should be guided by context.    "

## 2024-07-31 NOTE — ASSESSMENT & PLAN NOTE
Patient noted to have elevated troponin this afternoon as part of encephalopathy workup; currently 4677  Tachycardic overnight and anemic, likely elevated in demand ischemia  Have reversed anemia with blood transfusion hemoglobin currently 8.2  Monitor on telemetry; EKG with no acute findings  Have discussed with cardiology, reverse likely contributing factors and reassess

## 2024-07-31 NOTE — PLAN OF CARE
Problem: PHYSICAL THERAPY ADULT  Goal: Performs mobility at highest level of function for planned discharge setting.  See evaluation for individualized goals.  Description: Treatment/Interventions: Functional transfer training, LE strengthening/ROM, Therapeutic exercise, Patient/family training, Equipment eval/education, Bed mobility, Cognitive reorientation, Gait training, Compensatory technique education, Continued evaluation, Spoke to nursing, OT          See flowsheet documentation for full assessment, interventions and recommendations.  Outcome: Progressing  Note: Prognosis: Fair  Problem List: Decreased strength, Impaired balance, Decreased mobility, Decreased cognition, Impaired judgement, Decreased safety awareness, Pain (wbs)  Assessment: Anamaria demonstrates progress towards goals including decreased assist for bed mobility and sit<>stand transfers, progression to OOB mobility, improved am-pac. Continues to present w deficits of cognition, strength, balance, compliance to wbs. Require constant cuing during session for direction, attention to task/ redirection, safety, reinforcement of wbs and technique; noted inconsistent follow through dt acute confusion. Continues to require Ax2 for OOB transfers secondary to weakness, ?effort dt confusion. Poor trunk control at times dt confusion and impulsive behaviors. Noted poor balance during slide board transf dt minimal use of LLE and BUE for support. During attempts at standing transf w RW, pt unable to maintain wbs. Could intermittently achieve NWB RLE during static standing however inconsistent and ultimately unable to maintain balance in unilateral stance. Would continue to benefit from therapy services to help facilitate recovery and provide mobility training while nwb.  Barriers to Discharge: Decreased caregiver support, Inaccessible home environment  Barriers to Discharge Comments: son reports pt has to be indep to return home as he cannot provide complete  care for her  Rehab Resource Intensity Level, PT: II (Moderate Resource Intensity)    See flowsheet documentation for full assessment.

## 2024-07-31 NOTE — ASSESSMENT & PLAN NOTE
Wt Readings from Last 3 Encounters:   07/31/24 73.1 kg (161 lb 2.5 oz)   07/19/24 67.2 kg (148 lb 2.4 oz)   07/15/24 65.3 kg (144 lb)     Followed by cardiology, continue diuretics  AV node blockers on hold secondary to bradycardia  Remains 96% SpO2 on room air, not in acute exacerbation

## 2024-07-31 NOTE — QUICK NOTE
Notified by day shift of workup for AMS w/concern for possible shock liver elevated troponins etc.  Will add coags for fulminant hepatitis workup to pending ammonia/lactic delta troponins.  .  Pt alert although oriented only to self/president.  Inappropriate speech noted frequently but no other unilateral deficits appreciated.   Otherwise nontoxic appearing but does look ill.  Surgery recommends possible broadening of abx although given troponin elevation and recent 2:1 av block issues this is likely compensated shock from given liver injury possible cardiogenic given significant troponin I elevation and concern for volume overload on recent cxr/bnp.  Question if component of postoperative/perioperative hypotension contributing to this picture as well vs from progressive anemia.  D/w ID on call after ongoing discussion w/surgery PA/vasc fellow for recommendations who agrees difficult situation without presence of new fever or worsening hypotension as bps remain stable but recommend broadening to cefepime if needed in addition to flagyl in interim until seen by ID formally later in day.    A/P lactic acidosis  -Not a candidate for 30cc/kg given concern on xray for volume overload on recent cxr as well as being up 12 lbs since admission on standing scale weights.  Will give albumin to low volume albumin to increase renal perfusion.  Initial lactic 4.6 and 2.4 on repeat.  Will recheck in am given poor clearance from liver injury as will not .      -leucocytosis suspect from shock liver mods from inflammatory response from gangrene now w/source control.  Bcx ntd.  No worsening hypotension or fever to suggest worsening sepsis after d/w ID.  Abx for gangrene were broadened to cefepime/flagyl.    -hepatic encephalopathy.  W/coagulopathy and liver injury on basis of shock liver.  If liver enzymes continue to uptrend would raise concern for fulminant hepatitis.  No bilirubinemia  notably making obstructive  process or hemolytic process less likely.  Start lactulose.    -elevated troponin I.  Did have some sob w/tachybrady syndrome a few days prior but has felt well since then.  Suspect demand from gangrene/progressive anemia although has known underlying CAD that is being medically managed from cath in 2020.  Per d/w cardiology overnight 7/28 thru 7/29 holding iv heparinization due to progressive anemia.  May benefit from repeat echo given progressive multiorgan failure from unclear etiology.  Monitor on telemetry.

## 2024-07-31 NOTE — PLAN OF CARE
Problem: PAIN - ADULT  Goal: Verbalizes/displays adequate comfort level or baseline comfort level  Description: Interventions:  - Encourage patient to monitor pain and request assistance  - Assess pain using appropriate pain scale  - Administer analgesics based on type and severity of pain and evaluate response  - Implement non-pharmacological measures as appropriate and evaluate response  - Consider cultural and social influences on pain and pain management  - Notify physician/advanced practitioner if interventions unsuccessful or patient reports new pain  Outcome: Progressing     Problem: INFECTION - ADULT  Goal: Absence or prevention of progression during hospitalization  Description: INTERVENTIONS:  - Assess and monitor for signs and symptoms of infection  - Monitor lab/diagnostic results  - Monitor all insertion sites, i.e. indwelling lines, tubes, and drains  - Monitor endotracheal if appropriate and nasal secretions for changes in amount and color  - [place] appropriate cooling/warming therapies per order  - Administer medications as ordered  - Instruct and encourage patient and family to use good hand hygiene technique  - Identify and instruct in appropriate isolation precautions for identified infection/condition  Outcome: Progressing     Problem: SAFETY ADULT  Goal: Patient will remain free of falls  Description: INTERVENTIONS:  - Educate patient/family on patient safety including physical limitations  - Instruct patient to call for assistance with activity   - Consult OT/PT to assist with strengthening/mobility   - Keep Call bell within reach  - Keep bed low and locked with side rails adjusted as appropriate  - Keep care items and personal belongings within reach  - Initiate and maintain comfort rounds  - Make Fall Risk Sign visible to staff  - Offer Toileting every 2 Hours, in advance of need  - Initiate/Maintain bed alarm  - Apply yellow socks and bracelet for high fall risk patients  - Consider  moving patient to room near nurses station  Outcome: Progressing  Goal: Maintain or return to baseline ADL function  Description: INTERVENTIONS:  -  Assess patient's ability to carry out ADLs; assess patient's baseline for ADL function and identify physical deficits which impact ability to perform ADLs (bathing, care of mouth/teeth, toileting, grooming, dressing, etc.)  - Assess/evaluate cause of self-care deficits   - Assess range of motion  - Assess patient's mobility; develop plan if impaired  - Assess patient's need for assistive devices and provide as appropriate  - Encourage maximum independence but intervene and supervise when necessary  - Involve family in performance of ADLs  - Assess for home care needs following discharge   - Consider OT consult to assist with ADL evaluation and planning for discharge  - Provide patient education as appropriate  Outcome: Progressing  Goal: Maintains/Returns to pre admission functional level  Description: INTERVENTIONS:  - Perform AM-PAC 6 Click Basic Mobility/ Daily Activity assessment daily.  - Set and communicate daily mobility goal to care team and patient/family/caregiver.   - Collaborate with rehabilitation services on mobility goals if consulted  - Stand patient 3 times a day  - Ambulate patient 3 times a day  - Out of bed to chair 3 times a day   - Out of bed for meals 3 times a day  - Out of bed for toileting  - Record patient progress and toleration of activity level   Outcome: Progressing     Problem: DISCHARGE PLANNING  Goal: Discharge to home or other facility with appropriate resources  Description: INTERVENTIONS:  - Identify barriers to discharge w/patient and caregiver  - Arrange for needed discharge resources and transportation as appropriate  - Identify discharge learning needs (meds, wound care, etc.)  - Arrange for interpretive services to assist at discharge as needed  - Refer to Case Management Department for coordinating discharge planning if the  patient needs post-hospital services based on physician/advanced practitioner order or complex needs related to functional status, cognitive ability, or social support system  Outcome: Progressing     Problem: Knowledge Deficit  Goal: Patient/family/caregiver demonstrates understanding of disease process, treatment plan, medications, and discharge instructions  Description: Complete learning assessment and assess knowledge base.  Interventions:  - Provide teaching at level of understanding  - Provide teaching via preferred learning methods  Outcome: Progressing     Problem: CARDIOVASCULAR - ADULT  Goal: Maintains optimal cardiac output and hemodynamic stability  Description: INTERVENTIONS:  - Monitor I/O, vital signs and rhythm  - Monitor for S/S and trends of decreased cardiac output  - Administer and titrate ordered vasoactive medications to optimize hemodynamic stability  - Assess quality of pulses, skin color and temperature  - Assess for signs of decreased coronary artery perfusion  - Instruct patient to report change in severity of symptoms  Outcome: Progressing     Problem: METABOLIC, FLUID AND ELECTROLYTES - ADULT  Goal: Electrolytes maintained within normal limits  Description: INTERVENTIONS:  - Monitor labs and assess patient for signs and symptoms of electrolyte imbalances  - Administer electrolyte replacement as ordered  - Monitor response to electrolyte replacements, including repeat lab results as appropriate  - Instruct patient on fluid and nutrition as appropriate  Outcome: Progressing  Goal: Fluid balance maintained  Description: INTERVENTIONS:  - Monitor labs   - Monitor I/O and WT  - Instruct patient on fluid and nutrition as appropriate  - Assess for signs & symptoms of volume excess or deficit  Outcome: Progressing  Goal: Glucose maintained within target range  Description: INTERVENTIONS:  - Monitor Blood Glucose as ordered  - Assess for signs and symptoms of hyperglycemia and hypoglycemia  -  Administer ordered medications to maintain glucose within target range  - Assess nutritional intake and initiate nutrition service referral as needed  Outcome: Progressing     Problem: HEMATOLOGIC - ADULT  Goal: Maintains hematologic stability  Description: INTERVENTIONS  - Assess for signs and symptoms of bleeding or hemorrhage  - Monitor labs  - Administer supportive blood products/factors as ordered and appropriate  Outcome: Progressing     Problem: MUSCULOSKELETAL - ADULT  Goal: Maintain or return mobility to safest level of function  Description: INTERVENTIONS:  - Assess patient's ability to carry out ADLs; assess patient's baseline for ADL function and identify physical deficits which impact ability to perform ADLs (bathing, care of mouth/teeth, toileting, grooming, dressing, etc.)  - Assess/evaluate cause of self-care deficits   - Assess range of motion  - Assess patient's mobility  - Assess patient's need for assistive devices and provide as appropriate  - Encourage maximum independence but intervene and supervise when necessary  - Involve family in performance of ADLs  - Assess for home care needs following discharge   - Consider OT consult to assist with ADL evaluation and planning for discharge  - Provide patient education as appropriate  Outcome: Progressing  Goal: Maintain proper alignment of affected body part  Description: INTERVENTIONS:  - Support, maintain and protect limb and body alignment  - Provide patient/ family with appropriate education  Outcome: Progressing     Problem: Prexisting or High Potential for Compromised Skin Integrity  Goal: Skin integrity is maintained or improved  Description: INTERVENTIONS:  - Identify patients at risk for skin breakdown  - Assess and monitor skin integrity  - Assess and monitor nutrition and hydration status  - Monitor labs   - Assess for incontinence   - Turn and reposition patient  - Assist with mobility/ambulation  - Relieve pressure over bony  prominences  - Avoid friction and shearing  - Provide appropriate hygiene as needed including keeping skin clean and dry  - Evaluate need for skin moisturizer/barrier cream  - Collaborate with interdisciplinary team   - Patient/family teaching  - Consider wound care consult   Outcome: Progressing     Problem: Nutrition/Hydration-ADULT  Goal: Nutrient/Hydration intake appropriate for improving, restoring or maintaining nutritional needs  Description: Monitor and assess patient's nutrition/hydration status for malnutrition. Collaborate with interdisciplinary team and initiate plan and interventions as ordered.  Monitor patient's weight and dietary intake as ordered or per policy. Utilize nutrition screening tool and intervene as necessary. Determine patient's food preferences and provide high-protein, high-caloric foods as appropriate.     INTERVENTIONS:  - Monitor oral intake, urinary output, labs, and treatment plans  - Assess nutrition and hydration status and recommend course of action  - Evaluate amount of meals eaten  - Assist patient with eating if necessary   - Allow adequate time for meals  - Recommend/ encourage appropriate diets, oral nutritional supplements, and vitamin/mineral supplements  - Order, calculate, and assess calorie counts as needed  - Recommend, monitor, and adjust tube feedings and TPN/PPN based on assessed needs  - Assess need for intravenous fluids  - Provide specific nutrition/hydration education as appropriate  - Include patient/family/caregiver in decisions related to nutrition  Outcome: Progressing     Problem: NEUROSENSORY - ADULT  Goal: Achieves maximal functionality and self care  Description: INTERVENTIONS  - Monitor swallowing and airway patency with patient fatigue and changes in neurological status  - Encourage and assist patient to increase activity and self care.   - Encourage visually impaired, hearing impaired and aphasic patients to use assistive/communication  devices  Outcome: Progressing

## 2024-08-01 LAB
ABO GROUP BLD BPU: NORMAL
ALBUMIN SERPL BCG-MCNC: 3.6 G/DL (ref 3.5–5)
ALP SERPL-CCNC: 139 U/L (ref 34–104)
ALT SERPL W P-5'-P-CCNC: 330 U/L (ref 7–52)
ANION GAP SERPL CALCULATED.3IONS-SCNC: 10 MMOL/L (ref 4–13)
AST SERPL W P-5'-P-CCNC: 814 U/L (ref 13–39)
BACTERIA TISS AEROBE CULT: ABNORMAL
BACTERIA TISS AEROBE CULT: ABNORMAL
BASOPHILS # BLD AUTO: 0.11 THOUSANDS/ÂΜL (ref 0–0.1)
BASOPHILS NFR BLD AUTO: 0 % (ref 0–1)
BILIRUB SERPL-MCNC: 1.32 MG/DL (ref 0.2–1)
BPU ID: NORMAL
BUN SERPL-MCNC: 77 MG/DL (ref 5–25)
CALCIUM SERPL-MCNC: 8.6 MG/DL (ref 8.4–10.2)
CHLORIDE SERPL-SCNC: 107 MMOL/L (ref 96–108)
CO2 SERPL-SCNC: 26 MMOL/L (ref 21–32)
CREAT SERPL-MCNC: 2.19 MG/DL (ref 0.6–1.3)
CROSSMATCH: NORMAL
EOSINOPHIL # BLD AUTO: 0 THOUSAND/ÂΜL (ref 0–0.61)
EOSINOPHIL NFR BLD AUTO: 0 % (ref 0–6)
ERYTHROCYTE [DISTWIDTH] IN BLOOD BY AUTOMATED COUNT: 21.4 % (ref 11.6–15.1)
GFR SERPL CREATININE-BSD FRML MDRD: 21 ML/MIN/1.73SQ M
GLUCOSE SERPL-MCNC: 227 MG/DL (ref 65–140)
GLUCOSE SERPL-MCNC: 231 MG/DL (ref 65–140)
GLUCOSE SERPL-MCNC: 233 MG/DL (ref 65–140)
GLUCOSE SERPL-MCNC: 242 MG/DL (ref 65–140)
GLUCOSE SERPL-MCNC: 272 MG/DL (ref 65–140)
GRAM STN SPEC: ABNORMAL
GRAM STN SPEC: ABNORMAL
HCT VFR BLD AUTO: 32.7 % (ref 34.8–46.1)
HGB BLD-MCNC: 10.5 G/DL (ref 11.5–15.4)
IMM GRANULOCYTES # BLD AUTO: >0.5 THOUSAND/UL (ref 0–0.2)
IMM GRANULOCYTES NFR BLD AUTO: 5 % (ref 0–2)
LYMPHOCYTES # BLD AUTO: 0.36 THOUSANDS/ÂΜL (ref 0.6–4.47)
LYMPHOCYTES NFR BLD AUTO: 1 % (ref 14–44)
MAGNESIUM SERPL-MCNC: 2.3 MG/DL (ref 1.9–2.7)
MCH RBC QN AUTO: 30.5 PG (ref 26.8–34.3)
MCHC RBC AUTO-ENTMCNC: 32.1 G/DL (ref 31.4–37.4)
MCV RBC AUTO: 95 FL (ref 82–98)
MONOCYTES # BLD AUTO: 1.19 THOUSAND/ÂΜL (ref 0.17–1.22)
MONOCYTES NFR BLD AUTO: 4 % (ref 4–12)
NEUTROPHILS # BLD AUTO: 29.89 THOUSANDS/ÂΜL (ref 1.85–7.62)
NEUTS SEG NFR BLD AUTO: 90 % (ref 43–75)
NRBC BLD AUTO-RTO: 2 /100 WBCS
PHOSPHATE SERPL-MCNC: 4.3 MG/DL (ref 2.3–4.1)
PLATELET # BLD AUTO: 303 THOUSANDS/UL (ref 149–390)
PMV BLD AUTO: 10.6 FL (ref 8.9–12.7)
POTASSIUM SERPL-SCNC: 4.1 MMOL/L (ref 3.5–5.3)
PROT SERPL-MCNC: 6.1 G/DL (ref 6.4–8.4)
RBC # BLD AUTO: 3.44 MILLION/UL (ref 3.81–5.12)
SODIUM SERPL-SCNC: 143 MMOL/L (ref 135–147)
UNIT DISPENSE STATUS: NORMAL
UNIT PRODUCT CODE: NORMAL
UNIT PRODUCT VOLUME: 350 ML
UNIT RH: NORMAL
WBC # BLD AUTO: 33.34 THOUSAND/UL (ref 4.31–10.16)

## 2024-08-01 PROCEDURE — 88311 DECALCIFY TISSUE: CPT | Performed by: STUDENT IN AN ORGANIZED HEALTH CARE EDUCATION/TRAINING PROGRAM

## 2024-08-01 PROCEDURE — 82948 REAGENT STRIP/BLOOD GLUCOSE: CPT

## 2024-08-01 PROCEDURE — 99233 SBSQ HOSP IP/OBS HIGH 50: CPT

## 2024-08-01 PROCEDURE — 97542 WHEELCHAIR MNGMENT TRAINING: CPT

## 2024-08-01 PROCEDURE — 83735 ASSAY OF MAGNESIUM: CPT | Performed by: INTERNAL MEDICINE

## 2024-08-01 PROCEDURE — 99232 SBSQ HOSP IP/OBS MODERATE 35: CPT

## 2024-08-01 PROCEDURE — 97530 THERAPEUTIC ACTIVITIES: CPT

## 2024-08-01 PROCEDURE — 84100 ASSAY OF PHOSPHORUS: CPT | Performed by: INTERNAL MEDICINE

## 2024-08-01 PROCEDURE — 88305 TISSUE EXAM BY PATHOLOGIST: CPT | Performed by: STUDENT IN AN ORGANIZED HEALTH CARE EDUCATION/TRAINING PROGRAM

## 2024-08-01 PROCEDURE — 99232 SBSQ HOSP IP/OBS MODERATE 35: CPT | Performed by: INTERNAL MEDICINE

## 2024-08-01 PROCEDURE — 99233 SBSQ HOSP IP/OBS HIGH 50: CPT | Performed by: INTERNAL MEDICINE

## 2024-08-01 PROCEDURE — 85027 COMPLETE CBC AUTOMATED: CPT | Performed by: INTERNAL MEDICINE

## 2024-08-01 PROCEDURE — 80053 COMPREHEN METABOLIC PANEL: CPT | Performed by: INTERNAL MEDICINE

## 2024-08-01 PROCEDURE — 99024 POSTOP FOLLOW-UP VISIT: CPT | Performed by: SURGERY

## 2024-08-01 RX ORDER — METOPROLOL SUCCINATE 25 MG/1
25 TABLET, EXTENDED RELEASE ORAL DAILY
Status: DISCONTINUED | OUTPATIENT
Start: 2024-08-01 | End: 2024-08-09 | Stop reason: HOSPADM

## 2024-08-01 RX ORDER — INSULIN LISPRO 100 [IU]/ML
3 INJECTION, SOLUTION INTRAVENOUS; SUBCUTANEOUS
Status: DISCONTINUED | OUTPATIENT
Start: 2024-08-01 | End: 2024-08-03

## 2024-08-01 RX ADMIN — ASPIRIN 81 MG CHEWABLE TABLET 81 MG: 81 TABLET CHEWABLE at 09:36

## 2024-08-01 RX ADMIN — INSULIN LISPRO 2 UNITS: 100 INJECTION, SOLUTION INTRAVENOUS; SUBCUTANEOUS at 16:07

## 2024-08-01 RX ADMIN — ESCITALOPRAM OXALATE 20 MG: 20 TABLET ORAL at 09:36

## 2024-08-01 RX ADMIN — LACTULOSE 20 G: 20 SOLUTION ORAL at 09:35

## 2024-08-01 RX ADMIN — DEXTROSE 1000 MG: 50 INJECTION, SOLUTION INTRAVENOUS at 23:48

## 2024-08-01 RX ADMIN — INSULIN LISPRO 3 UNITS: 100 INJECTION, SOLUTION INTRAVENOUS; SUBCUTANEOUS at 16:08

## 2024-08-01 RX ADMIN — INSULIN LISPRO 2 UNITS: 100 INJECTION, SOLUTION INTRAVENOUS; SUBCUTANEOUS at 08:00

## 2024-08-01 RX ADMIN — TICAGRELOR 90 MG: 90 TABLET ORAL at 09:38

## 2024-08-01 RX ADMIN — LACTULOSE 20 G: 20 SOLUTION ORAL at 16:06

## 2024-08-01 RX ADMIN — TICAGRELOR 90 MG: 90 TABLET ORAL at 22:07

## 2024-08-01 RX ADMIN — LACTULOSE 20 G: 20 SOLUTION ORAL at 22:06

## 2024-08-01 RX ADMIN — INSULIN DETEMIR 10 UNITS: 100 INJECTION, SOLUTION SUBCUTANEOUS at 09:36

## 2024-08-01 RX ADMIN — METRONIDAZOLE 500 MG: 500 TABLET ORAL at 05:18

## 2024-08-01 RX ADMIN — TORSEMIDE 10 MG: 10 TABLET ORAL at 09:38

## 2024-08-01 RX ADMIN — LIDOCAINE 5% 1 PATCH: 700 PATCH TOPICAL at 09:35

## 2024-08-01 RX ADMIN — INSULIN LISPRO 3 UNITS: 100 INJECTION, SOLUTION INTRAVENOUS; SUBCUTANEOUS at 12:02

## 2024-08-01 RX ADMIN — SODIUM BICARBONATE 650 MG TABLET 650 MG: at 16:08

## 2024-08-01 RX ADMIN — METOPROLOL SUCCINATE 25 MG: 25 TABLET, EXTENDED RELEASE ORAL at 12:00

## 2024-08-01 RX ADMIN — CEFEPIME 1000 MG: 1 INJECTION, POWDER, FOR SOLUTION INTRAMUSCULAR; INTRAVENOUS at 00:41

## 2024-08-01 RX ADMIN — SODIUM BICARBONATE 650 MG TABLET 650 MG: at 08:30

## 2024-08-01 RX ADMIN — ALLOPURINOL 100 MG: 100 TABLET ORAL at 09:36

## 2024-08-01 NOTE — CASE MANAGEMENT
Case Management Discharge Planning Note    Patient name Anamaria Parnell  Location [place] [address] /[address] M* MRN [id number]  : 1945 Date 2024       Current Admission Date: 2024  Current Admission Diagnosis:Aortoiliac occlusive disease (HCC)   Patient Active Problem List    Diagnosis Date Noted Date Diagnosed    Acute blood loss anemia 2024     Encephalopathy 2024     Elevated troponin 2024     Acute liver failure without hepatic coma 2024     Transaminitis 2024     Pulmonary hypertension (HCC) 2024     Chronic HFrEF (heart failure with reduced ejection fraction) (HCC) 2024     Paroxysmal atrial fibrillation (HCC) 2024     Coronary artery disease of native artery of native heart with stable angina pectoris (HCC) 2024     Cellulitis of toe of right foot 2024     Sepsis (HCC) 2024     Diabetic ulcer of toe of right foot associated with diabetes mellitus due to underlying condition, limited to breakdown of skin (HCC) 2024     Carotid stenosis, asymptomatic, right 10/27/2023     Complex renal cyst 10/18/2023     Encounter for follow-up surveillance of urothelial carcinoma of lower urinary tract 10/18/2023     Encounter to discuss test results 10/17/2023     Smoking 2023     Renal cyst 2023     Primary hypertension 2023     Dysarthria 08/15/2023     Stage 3b chronic kidney disease (HCC)      Aortoiliac occlusive disease (HCC) 10/11/2022     History of gastrointestinal stromal tumor (GIST) 2022     Secondary hyperparathyroidism of renal origin (HCC) 2022     Gastrointestinal stromal tumor (GIST) (HCC) 2022     Type 2 diabetes mellitus, without long-term current use of insulin (HCC) 2021     Type 2 diabetes mellitus with diabetic peripheral angiopathy without gangrene (HCC) 2021     Depression, recurrent (HCC) 2021     Acute renal failure (ARF) (HCC) 2020     Hypertensive  kidney disease with stage 4 chronic kidney disease (HCC) 11/03/2020     Cervical radiculopathy 05/12/2020     Malignant neoplasm of overlapping sites of bladder ([facility]) 04/24/2020     Stenosis of left anterior descending artery Mid LAD 50-60% stenosis 04/01/2020     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 04/01/2020     Pulmonary nodule 7 mm groundglass opacity in the right upper lobe, unchanged since October 2019 03/19/2020     Atherosclerosis of native arteries of right leg with ulceration of other part of foot ([facility]) 03/03/2020     Symptomatic carotid artery stenosis, left 03/03/2020     Femoral artery stenosis, right (HCC) 50-75% stenosis in the common femoral artery. 01/14/2020     Mesenteric artery stenosis ([facility]) 01/14/2020     Positive cardiac stress test  small, mildly severe, partially reversible myocardial perfusion defect of anterior and inferior wall  11/12/2019     Abnormal CT of the chest suspicious for an infectious or inflammatory bronchiolitis. 11/07/2019     Celiac artery stenosis ([facility]) >70% stenosis in the celiac trunk 11/05/2019     Aortoiliac stenosis, left ([facility]) 02/25/2019     Spondylosis of lumbar region without myelopathy or radiculopathy 01/29/2019     Lumbosacral spondylosis without myelopathy 01/29/2019     Statin intolerance 01/22/2019     Lumbar disc herniation 01/22/2019     Herniated lumbar intervertebral disc 01/22/2019     Chronic GERD 12/04/2017     Acute renal failure superimposed on stage 3 chronic kidney disease ([facility]) 12/04/2017     Claudication in peripheral vascular disease ([facility]) 12/04/2017     Gout 12/04/2017     Hx-TIA (transient ischemic attack) 12/04/2017     Hyperlipidemia associated with type 2 diabetes mellitus  ([facility]) 12/04/2017     Hypertension associated with diabetes  ([facility]) 12/04/2017     Osteoarthritis 12/04/2017     Right renal artery stenosis ([facility]) 12/04/2017     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      Hyperlipidemia, unspecified 12/04/2017     Vitamin D deficiency 09/08/2016     Basilar artery stenosis 06/22/2016     Type 2 diabetes mellitus with diabetic nephropathy (HCC) 12/19/2015     Hypercholesteremia 12/19/2015     Hypertension 12/19/2015     CVA (cerebral vascular accident) (HCC) 11/09/2015       LOS (days): 13  Geometric Mean LOS (GMLOS) (days): 6.5  Days to GMLOS:-6.4     OBJECTIVE:  Risk of Unplanned Readmission Score: 44.06         Current admission status: Inpatient   Preferred Pharmacy:   [facility]/pharmacy #[id number] - [place] - [address]  [address]  [facility] [address]  Phone: [phone] Fax: [phone]    Primary Care Provider: Ritchie Lawton MD    Primary Insurance: [facility]  Secondary Insurance:     DISCHARGE DETAILS:     Additional Comments: CM provided HHA waiver application and directions to complete to patient's son, Austin at bedside. CM explained HHA (office of aging) to assess once patient is discharged to home. CM to follow for further discharge needs.       Saima Nj,

## 2024-08-01 NOTE — ASSESSMENT & PLAN NOTE
History of CVA. MRI 7/2023 showing: Large area of acute to subacute ischemia left PCA distribution occipital lobe. Small foci of acute to subacute ischemia in the left frontal and left parietal/temporal lobes MCA distribution.  Continue Aspirin; statin temporarily on hold for acute hepatic failure  Restart once LFTs within normal limits

## 2024-08-01 NOTE — ASSESSMENT & PLAN NOTE
Patient noted to have anemia on 7/30 labs, likely secondary to recent surgical procedures  Has received 2 units PRBCs  Given other comorbidities secondary to acute blood loss anemia, will attempt to keep hemoglobin levels above 8  Currently hemoglobin 10.5; monitor on daily labs

## 2024-08-01 NOTE — PLAN OF CARE
Problem: PAIN - ADULT  Goal: Verbalizes/displays adequate comfort level or baseline comfort level  Description: Interventions:  - Encourage patient to monitor pain and request assistance  - Assess pain using appropriate pain scale  - Administer analgesics based on type and severity of pain and evaluate response  - Implement non-pharmacological measures as appropriate and evaluate response  - Consider cultural and social influences on pain and pain management  - Notify physician/advanced practitioner if interventions unsuccessful or patient reports new pain  Outcome: Progressing     Problem: INFECTION - ADULT  Goal: Absence or prevention of progression during hospitalization  Description: INTERVENTIONS:  - Assess and monitor for signs and symptoms of infection  - Monitor lab/diagnostic results  - Monitor all insertion sites, i.e. indwelling lines, tubes, and drains  - Monitor endotracheal if appropriate and nasal secretions for changes in amount and color  - [place] appropriate cooling/warming therapies per order  - Administer medications as ordered  - Instruct and encourage patient and family to use good hand hygiene technique  - Identify and instruct in appropriate isolation precautions for identified infection/condition  Outcome: Progressing     Problem: SAFETY ADULT  Goal: Patient will remain free of falls  Description: INTERVENTIONS:  - Educate patient/family on patient safety including physical limitations  - Instruct patient to call for assistance with activity   - Consult OT/PT to assist with strengthening/mobility   - Keep Call bell within reach  - Keep bed low and locked with side rails adjusted as appropriate  - Keep care items and personal belongings within reach  - Initiate and maintain comfort rounds  - Make Fall Risk Sign visible to staff  - Offer Toileting every 2 Hours, in advance of need  - Initiate/Maintain bed alarm  - Apply yellow socks and bracelet for high fall risk patients  - Consider  moving patient to room near nurses station  Outcome: Progressing  Goal: Maintain or return to baseline ADL function  Description: INTERVENTIONS:  -  Assess patient's ability to carry out ADLs; assess patient's baseline for ADL function and identify physical deficits which impact ability to perform ADLs (bathing, care of mouth/teeth, toileting, grooming, dressing, etc.)  - Assess/evaluate cause of self-care deficits   - Assess range of motion  - Assess patient's mobility; develop plan if impaired  - Assess patient's need for assistive devices and provide as appropriate  - Encourage maximum independence but intervene and supervise when necessary  - Involve family in performance of ADLs  - Assess for home care needs following discharge   - Consider OT consult to assist with ADL evaluation and planning for discharge  - Provide patient education as appropriate  Outcome: Progressing  Goal: Maintains/Returns to pre admission functional level  Description: INTERVENTIONS:  - Perform AM-PAC 6 Click Basic Mobility/ Daily Activity assessment daily.  - Set and communicate daily mobility goal to care team and patient/family/caregiver.   - Collaborate with rehabilitation services on mobility goals if consulted  - Stand patient 3 times a day  - Ambulate patient 3 times a day  - Out of bed to chair 3 times a day   - Out of bed for meals 3 times a day  - Out of bed for toileting  - Record patient progress and toleration of activity level   Outcome: Progressing     Problem: DISCHARGE PLANNING  Goal: Discharge to home or other facility with appropriate resources  Description: INTERVENTIONS:  - Identify barriers to discharge w/patient and caregiver  - Arrange for needed discharge resources and transportation as appropriate  - Identify discharge learning needs (meds, wound care, etc.)  - Arrange for interpretive services to assist at discharge as needed  - Refer to Case Management Department for coordinating discharge planning if the  patient needs post-hospital services based on physician/advanced practitioner order or complex needs related to functional status, cognitive ability, or social support system  Outcome: Progressing     Problem: Knowledge Deficit  Goal: Patient/family/caregiver demonstrates understanding of disease process, treatment plan, medications, and discharge instructions  Description: Complete learning assessment and assess knowledge base.  Interventions:  - Provide teaching at level of understanding  - Provide teaching via preferred learning methods  Outcome: Progressing

## 2024-08-01 NOTE — PHYSICAL THERAPY NOTE
PHYSICAL THERAPY NOTE          Patient Name: Anamarai Parnell  Today's Date: 8/1/2024 08/01/24 1438   PT Last Visit   PT Visit Date 08/01/24   Note Type   Note Type Treatment   Pain Assessment   Pain Assessment Tool 0-10   Pain Score 9   Pain Location/Orientation Location: Back   Hospital Pain Intervention(s) Emotional support;Rest   Restrictions/Precautions   RLE Weight Bearing Per Order NWB   Other Precautions Cognitive;Chair Alarm;Bed Alarm;WBS;Multiple lines;Fall Risk;Pain   General   Chart Reviewed Yes   Response to Previous Treatment Patient with no complaints from previous session.   Family/Caregiver Present Yes   Cognition   Overall Cognitive Status Impaired   Arousal/Participation Cooperative   Attention Attends with cues to redirect   Orientation Level Oriented to person;Oriented to place;Disoriented to time;Disoriented to situation  (general to place)   Memory Decreased recall of precautions;Decreased recall of recent events;Decreased short term memory   Following Commands Follows one step commands with increased time or repetition   Comments continues to present w acute confusion. noted impaired STM compared to IE. difficulty maintaining attention to task.   Bed Mobility   Supine to Sit 4  Minimal assistance   Additional items Assist x 1;HOB elevated;Bedrails;Increased time required;Verbal cues;LE management;Other  (some trunk support)   Sit to Supine 3  Moderate assistance   Additional items Assist x 2;Increased time required;Verbal cues;LE management;Other  (trunk support)   Transfers   Sliding Board transfer 2  Maximal assistance   Additional items Assist x 2;Increased time required;Verbal cues   Additional Comments slide board bed<>wc   Wheelchair Activities   Propulsion Yes   Propulsion Type 1 Manual   Level 1 Level tile   Method 1 Right upper extremity;Left upper extremity   Level of Assistance 1 Minimum  assistance;Close supervision;Directs care (Comment);Maximum verbal cues   Description/ Details 1 about 10'x2. mostly forward propulsion however participates in turns L/R w occ mod A   Balance   Static Sitting Fair -   Endurance Deficit   Endurance Deficit Yes   Endurance Deficit Description frequent complaints of fatigue, SOB however vitals stable on RA   Activity Tolerance   Activity Tolerance Patient limited by fatigue;Treatment limited secondary to medical complications (Comment)  (cognition, wbs)   Medical Staff Made Aware [facility]   Nurse Made Aware yes   Assessment   Prognosis Fair   Problem List Decreased strength;Impaired balance;Decreased mobility;Decreased cognition;Impaired judgement;Decreased safety awareness;Pain  (wbs)   Assessment Anamaria was seen for a f/u session and demonstrates progress towards goals including improved am-pac, decreased level of assist for bed mobility and slide board transfers, progression to OOB mobility via slide board. Continues to present w deficits of cognition, strength, balance, pain. Unable to navigate household environment, maintain compliance w WBS (require A during slide board transfers), has inadequate support at home for dc at current LOF, is at risk for falls. Would continue to benefit from therapy services to help facilitate recovery.   Barriers to Discharge Decreased caregiver support;Inaccessible home environment   Barriers to Discharge Comments son reports pt has to be indep to return home as he cannot provide complete care for her   Goals   Patient Goals feel better/ less back pain   STG Expiration Date 08/09/24   Short Term Goal #1 1) Pt will perform bed mobility with mod Ax1 demonstrating appropriate technique 100% of the time in order to improve function. 2) Perform all transfers with mod Ax1 demonstrating safe and appropriate technique 100% of the time in order to improve ability to negotiate safely in home environment. 3) Pt will increase standing  tolerance to at least 1 minute in order to demonstrate readiness for ambulation activity. 4) Pt will tolerate amb at a distance of >50' w/ appropriate AD and mod Ax2 in order to demonstrate ability to complete household distances. 5) Pt will self propel WC  for a distance of >100' w/ S in order to demonstrate improvement/independence in functional mobility. 6) Pt will maintain % of the time in order to demonstrate understanding and carryover of all education. 7) Pt will increase activity tolerance to at least 30 minutes to demonstrate improved cardiovascular endurance and ability to participate in tasks as required. 8) Pt/caregiver education as appropriate and requested   PT Treatment Day 2   Plan   Treatment/Interventions Functional transfer training;LE strengthening/ROM;Therapeutic exercise;Endurance training;Cognitive reorientation;Patient/family training;Equipment eval/education;Bed mobility;Compensatory technique education;Continued evaluation;Spoke to nursing;OT;Family   Progress Slow progress, decreased activity tolerance   PT Frequency 3-5x/wk   Discharge Recommendation   Rehab Resource Intensity Level, PT II (Moderate Resource Intensity)   AM-PAC Basic Mobility Inpatient   Turning in Flat Bed Without Bedrails 3   Lying on Back to Sitting on Edge of Flat Bed Without Bedrails 2   Moving Bed to Chair 1   Standing Up From Chair Using Arms 1   Walk in Room 1   Climb 3-5 Stairs With Railing 1   Basic Mobility Inpatient Raw Score 9   Turning Head Towards Sound 3   Follow Simple Instructions 2   Low Function Basic Mobility Raw Score  14   Low Function Basic Mobility Standardized Score  22.01   [facility] Highest Level Of Mobility   -HLM Goal 3: Sit at edge of bed   -HLM Achieved 4: Move to chair/commode   Education   Education Provided Mobility training   Patient Reinforcement needed   End of Consult   Patient Position at End of Consult Supine;All needs within reach;Bed/Chair alarm activated      Alicia Abbott, PT

## 2024-08-01 NOTE — ASSESSMENT & PLAN NOTE
"Converted spontaneously before she was placed on telemetry. ECG reviewed with cardiology.  Per cardiology: \"Given her anemia, DAPT for recent surgery, recent surgery and low A-fib burden, will hold off starting anticoagulation for now.\"     7/29-update: Patient now with tachybradycardia syndrome; cardiology recommending transfer to [place] sometime following Friday's vascular surgery procedure for possible implantation of pacemaker.  "

## 2024-08-01 NOTE — ASSESSMENT & PLAN NOTE
PAD and aortic disease, R CLTI and R great toe dry gangrene. S/P Bilateral femoral endarterectomy with L to R fem-fem PTFE bypass and retrograde L JACI, L EIA stenting, bilateral groin VAC placement on 7/24   Intervention planned for Friday, 8/6

## 2024-08-01 NOTE — ASSESSMENT & PLAN NOTE
Patient experienced profound elevation in LFTs on evening of 7/30-likely shock liver in the setting of acute anemia  Has been transfused, LFTs this morning much improved  Ammonia now within normal limits; consult to gastroenterology  Ultrasound right upper quadrant noted, some mild CBD dilation, no other abnormalities noted

## 2024-08-01 NOTE — PROGRESS NOTES
[facility]  Progress Note  Name: Anamaria Parnell I  MRN: [id number]  Unit/Bed#: [place] [address] -01 I Date of Admission: 7/19/2024   Date of Service: 8/1/2024 I Hospital Day: 13    Assessment & Plan   * Aortoiliac occlusive disease (HCC)  Assessment & Plan  PAD and aortic disease, R CLTI and R great toe dry gangrene. S/P Bilateral femoral endarterectomy with L to R fem-fem PTFE bypass and retrograde L JACI, L EIA stenting, bilateral groin VAC placement on 7/24   Intervention planned for Friday, 8/6    Acute blood loss anemia  Assessment & Plan  Patient noted to have anemia on 7/30 labs, likely secondary to recent surgical procedures  Has received 2 units PRBCs  Given other comorbidities secondary to acute blood loss anemia, will attempt to keep hemoglobin levels above 8  Currently hemoglobin 10.5; monitor on daily labs      Acute liver failure without hepatic coma  Assessment & Plan  Patient experienced profound elevation in LFTs on evening of 7/30-likely shock liver in the setting of acute anemia  Has been transfused, LFTs this morning much improved  Ammonia now within normal limits; consult to gastroenterology  Ultrasound right upper quadrant noted, some mild CBD dilation, no other abnormalities noted    Elevated troponin  Assessment & Plan  Patient noted to have elevated troponin this afternoon as part of encephalopathy workup; currently 4677  Tachycardic overnight and anemic, likely elevated in demand ischemia  Have reversed anemia with blood transfusion hemoglobin currently 10.5  Monitor on telemetry; EKG with no acute findings  Have discussed with cardiology, reverse likely contributing factors and reassess  Unfortunately, echocardiogram obtained and workup of elevated troponin shows worsening EF and global hypokinesis      Encephalopathy  Assessment & Plan  Patient noted to be acutely confused on morning of 7/30  Likely multifactorial attributable to late afternoon early evening  "anesthesia and lack of sleep overnight  Workup revealed several abnormalities as noted above, most have been reversed with transfusion of PRBCs  Mentation much improved; appreciate geriatric recommendations      Chronic HFrEF (heart failure with reduced ejection fraction) ([facility])  Assessment & Plan  Wt Readings from Last 3 Encounters:   08/01/24 61.9 kg (136 lb 8 oz)   07/19/24 67.2 kg (148 lb 2.4 oz)   07/15/24 65.3 kg (144 lb)     Followed by cardiology, continue diuretics  AV node blockers on hold secondary to bradycardia  Remains 96% SpO2 on room air, not in acute exacerbation        Paroxysmal atrial fibrillation (HCC)  Assessment & Plan  Converted spontaneously before she was placed on telemetry. ECG reviewed with cardiology.  Per cardiology: \"Given her anemia, DAPT for recent surgery, recent surgery and low A-fib burden, will hold off starting anticoagulation for now.\"     7/29-update: Patient now with tachybradycardia syndrome; cardiology recommending transfer to [place] sometime following Friday's vascular surgery procedure for possible implantation of pacemaker.    Coronary artery disease of native artery of native heart with stable angina pectoris ([facility])  Assessment & Plan  CAD with history of PCI.  No chest pain.  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, followed by podiatry    Primary hypertension  Assessment & Plan  Controlled  Continue amlodipine, clonidine, metoprolol    Type 2 diabetes mellitus, without long-term current use of insulin ([facility])  Assessment & Plan  Lab Results   Component Value Date    HGBA1C 5.9 (H) 07/09/2024     Recent Labs     " 07/31/24  1117 07/31/24  1634 07/31/24  2141 08/01/24  0741   POCGLU 260* 247* 253* 233*       Home regimen: Tradjenta  Inpatient regimen: Sliding scale    7/31-update: Patient's glucose levels not at goal, may represent physiological reaction to ongoing treatment plan.  Have added 10 units long-acting insulin daily, monitor and uptitrate/add prandial insulin as needed    8/1-update: Blood glucose level still above goal, will initiate 3 units prandial insulin 3 times daily with meals      Depression, recurrent (HCC)  Assessment & Plan  Continue Lexapro    Acute renal failure (ARF) (HCC)  Assessment & Plan  Baseline 1.4-1.7  Initial RICARDO likely due to sepsis / pre-renal.  Had resolved, but unfortunately patient with acute blood loss anemia 7/30 and creatinine spiked  Improving to 2.19 on morning labs following transfusion of 2 units PRBCs  Monitor and reconsult nephrology as needed      Recent Labs     07/30/24  1808 07/31/24  0502 08/01/24  0440   BUN 70* 77* 77*   CREATININE 2.17* 2.37* 2.19*   EGFR 21 19 21                 CVA (cerebral vascular accident) (HCC)  Assessment & Plan  History of CVA. MRI 7/2023 showing: Large area of acute to subacute ischemia left PCA distribution occipital lobe. Small foci of acute to subacute ischemia in the left frontal and left parietal/temporal lobes MCA distribution.  Continue Aspirin; statin temporarily on hold for acute hepatic failure  Restart once LFTs within normal limits    Basilar artery stenosis  Assessment & Plan  history of carotid surgery.    Follows with Dr. Gallo               VTE Pharmacologic Prophylaxis: VTE Score: 3 Moderate Risk (Score 3-4) - Pharmacological DVT Prophylaxis Contraindicated. Sequential Compression Devices Ordered.    Mobility:   Basic Mobility Inpatient Raw Score: 9  JH-HLM Goal: 3: Sit at edge of bed  JH-HLM Achieved: 4: Move to chair/commode  JH-HLM Goal achieved. Continue to encourage appropriate mobility.    Patient Centered Rounds: I  performed bedside rounds with nursing staff today.   Discussions with Specialists or Other Care Team Provider: Vascular surgery    Education and Discussions with Family / Patient: Updated  (son) at bedside.    Total Time Spent on Date of Encounter in care of patient: 45 mins. This time was spent on one or more of the following: performing physical exam; counseling and coordination of care; obtaining or reviewing history; documenting in the medical record; reviewing/ordering tests, medications or procedures; communicating with other healthcare professionals and discussing with patient's family/caregivers.    Current Length of Stay: 13 day(s)  Current Patient Status: Inpatient   Certification Statement: The patient will continue to require additional inpatient hospital stay due to treatment of severe sepsis/gangrenous hallux  Discharge Plan: Anticipate discharge in >72 hrs to discharge location to be determined pending rehab evaluations.    Code Status: Level 3 - DNAR and DNI    Subjective:   Patient seen and examined bedside, overall appears much improved and enjoying time spending with her son at bedside.  Labs and vitals continue to improve from 2 days prior with exception of white blood count.  Appreciate ID recommendations, have transitioned patient to ceftriaxone alone.  Vascular surgery reporting surgical time moved to Tuesday; however, primary team has been advised by multiple providers that patient may not move forward with surgery as she states she is getting tired of all the medical procedures.  Will have in-depth conversation with patient and son tomorrow.  In the meantime, echocardiogram also showing worsening EF and global hypokinesis.  Some concern that patient would do well surgically if she does move forward with the procedure.    Goals of care conversation with patient and family tomorrow, anticipate serious illness conversation and further guidance on plan moving forward from  patient.    Objective:     Vitals:   Temp (24hrs), Av.7 °F (36.5 °C), Min:97.3 °F (36.3 °C), Max:98.1 °F (36.7 °C)    Temp:  [97.3 °F (36.3 °C)-98.1 °F (36.7 °C)] 97.4 °F (36.3 °C)  HR:  [86-95] 89  Resp:  [14-18] 18  BP: (126-157)/(71-82) 126/71  SpO2:  [91 %-97 %] 95 %  Body mass index is 28.53 kg/m².     Input and Output Summary (last 24 hours):     Intake/Output Summary (Last 24 hours) at 2024 1743  Last data filed at 2024 1307  Gross per 24 hour   Intake 1490 ml   Output 329 ml   Net 1161 ml       Physical Exam:   Physical Exam  Vitals and nursing note reviewed.   Constitutional:       General: She is not in acute distress.     Appearance: She is well-developed.   HENT:      Head: Normocephalic and atraumatic.   Eyes:      Conjunctiva/sclera: Conjunctivae normal.   Cardiovascular:      Rate and Rhythm: Normal rate. Rhythm irregular.   Pulmonary:      Effort: Pulmonary effort is normal. No respiratory distress.   Abdominal:      Palpations: Abdomen is soft.      Tenderness: There is no abdominal tenderness.   Musculoskeletal:         General: Swelling, deformity and signs of injury present.      Cervical back: Neck supple.      Right lower leg: Edema present.   Skin:     General: Skin is warm and dry.      Comments: Bilateral groin incisions   Neurological:      Mental Status: She is alert.   Psychiatric:         Mood and Affect: Mood normal.            Additional Data:     Labs:  Results from last 7 days   Lab Units 24  0440 24  1715 24  0502   WBC Thousand/uL 33.34*  --  31.56*   HEMOGLOBIN g/dL 10.5*   < > 7.4*   HEMATOCRIT % 32.7*   < > 22.5*   PLATELETS Thousands/uL 303  --  318   BANDS PCT %  --   --  4   SEGS PCT % 90*  --   --    LYMPHO PCT % 1*  --  3*   MONO PCT % 4  --  5   EOS PCT % 0  --  0    < > = values in this interval not displayed.     Results from last 7 days   Lab Units 24  0440   SODIUM mmol/L 143   POTASSIUM mmol/L 4.1   CHLORIDE mmol/L 107   CO2 mmol/L  26   BUN mg/dL 77*   CREATININE mg/dL 2.19*   ANION GAP mmol/L 10   CALCIUM mg/dL 8.6   ALBUMIN g/dL 3.6   TOTAL BILIRUBIN mg/dL 1.32*   ALK PHOS U/L 139*   ALT U/L 330*   AST U/L 814*   GLUCOSE RANDOM mg/dL 231*     Results from last 7 days   Lab Units 07/31/24  1111   INR  1.70*     Results from last 7 days   Lab Units 08/01/24  1601 08/01/24  1137 08/01/24  0741 07/31/24  2141 07/31/24  1634 07/31/24  1117 07/31/24  0755 07/30/24  2102 07/30/24  1616 07/30/24  1116 07/30/24  0726 07/29/24  1830   POC GLUCOSE mg/dl 227* 272* 233* 253* 247* 260* 285* 253* 195* 200* 203* 147*         Results from last 7 days   Lab Units 07/31/24  0502 07/31/24  0121 07/30/24  2202   LACTIC ACID mmol/L 1.9 2.4* 4.6*       Lines/Drains:  Invasive Devices       Peripheral Intravenous Line  Duration             Peripheral IV 07/30/24 Left;Proximal;Ventral (anterior) Antecubital 2 days    Peripheral IV 07/31/24 Right Antecubital 1 day                          Imaging: No pertinent imaging reviewed.    Recent Cultures (last 7 days):   Results from last 7 days   Lab Units 07/29/24  1805   GRAM STAIN RESULT  Rare Polys*  Rare Gram negative rods*       Last 24 Hours Medication List:   Current Facility-Administered Medications   Medication Dose Route Frequency Provider Last Rate    allopurinol  100 mg Oral Daily Costa Omer DPM      aspirin  81 mg Oral Daily Costa Omer DPM      [START ON 8/2/2024] cefTRIAXone  1,000 mg Intravenous Q24H Amaya Aldea, DO      escitalopram  20 mg Oral Daily Costa Omer DPM      HYDROmorphone  0.2 mg Intravenous Q6H PRN Costa Omer DPM      insulin detemir  10 Units Subcutaneous Daily Cullen Reinoso MD      insulin lispro  1-5 Units Subcutaneous TID AC Costa Omer DPM      insulin lispro  3 Units Subcutaneous TID With Meals Cullen Reinoso MD      lactulose  20 g Oral TID Soraida Tobias PA-C      lidocaine  1 patch Topical Daily Costa Omer, DPM       metoprolol succinate  25 mg Oral Daily Timothy Rey DO      ondansetron  4 mg Intravenous Q6H PRN Costa Omer DPM      oxyCODONE  5 mg Oral Q6H PRN Costa Omer DPM      sodium bicarbonate  650 mg Oral BID after meals Costa Omer DPM      ticagrelor  90 mg Oral Q12H JAVIER Costa Omer DPM      torsemide  10 mg Oral Daily Costa Omer DPM          Today, Patient Was Seen By: Cullen Reinoso MD    **Please Note: This note may have been constructed using a voice recognition system.**

## 2024-08-01 NOTE — PROGRESS NOTES
Progress Note - Infectious Disease   Anamaria Parnell 79 y.o. female MRN: [id number]  Unit/Bed#: [place] [address] -01 Encounter: [id number]    Impression/Plan:  1. Leukocytosis. Likely reactive in setting of critical limb ischemia. Also consider role of R foot infection. Now with operative culture suggesting a non lactose fermenting GNR. No other clear source appreciated. Single set of blood cultures was negative. She's remained afebrile.   -antibiotics as below  -check CBCD and BMP tomorrow  -follow up operative cultures  -follow up liver work up as below  -monitor vitals     2. R hallux dry gangrene. In setting of severe PAD with critical limb ischemia and DMII. Patient underwent a course of IV cefazolin prior to surgical intervention. Podiatry took patient to the OR on 7/29/2024 for R hallux amputation and partial 1st ray resection. Wound is packed open and will likely require definitive closure once patient is medically stable. Patient had been on cefazolin and flagyl but antibiotic regimen was broadened to cefepime and flagyl overnight due to worsening leukocytosis and lactic acidosis. Tissue culture now suggesting a non lactose fermenting GNR. Would recommend continuing the cefepime and flagyl for now while we await further culture work up.  -continue IV cefepime  -continue PO flagyl  -check CBCD and BMP tomorrow  -follow up wound culture  -monitor vitals  -serial R foot exams  -surgical site care per podiatry  -continue follow up with podiatry      3. PAD. LEAD from 7/16/2024 with severe B/L LE disease through the femoral-popliteal arteries with high grade stenosis vs occlusion. Vascular surgery has been following closely. Patient is s/p bilateral femoral endarterectomy with left to right femorofemoral PTFE bypass and retrograde left JACI, left EIA stenting, and bilateral groin VAC placement on 7/24/2024. She will need definitive R sided fem-pop bypass which is tentatively planned for 8/2/2024.  -serial B/L LE  exams  -VAC care per vascular surgery  -continue close follow up with vascular surgery      4. Type 2 diabetes mellitus without long term insulin use. Patient's last HbA1c was 5.9% on 7/9/2024. Elevated blood glucose is risk factor for infection.   -recommend tight glycemic control  -blood glucose management per primary service     5. RICARDO. On CKD stage 3. This impacts antibiotic dosing. Upon review of patient's available medical records it appears her baseline creatinine is approximately 1.3-1.8. Creatinine upon admission was 2.19. Likely prerenal in setting of acute foot infection. Consider role of renal artery stenosis. Nephrology is following. Creatinine did initially improve but became elevated again yesterday. This morning her creatinine has trended down to 2.19.  -check creatinine again tomorrow  -dose adjust antibiotics for renal function as needed  -avoid nephrotoxins  -volume management per primary service/nephrology  -continue follow up with nephrology     6. Possible shock liver. Patient with late development yesterday of elevated LFTs, ammonia, troponin, and lactic acid. Also with episodes of hypotension last night. She has been given albumin and PRBC. Cardiology is following. Gastroenterology  is following. RUQ ultrasound with dopplers showed a dilated CBD. Portal venous flow was hepatopetal.  -check CBCD and CMP tomorrow  -transfusion support per primary service  -dose adjust antibiotics for hepatic function as needed  -serial abdominal exams  -monitor GI symptoms  -continue follow up with cardiology  -continue follow up with gastroenterology     Above plan was discussed in detail with patient at the bedside.  Above plan was discussed in detail with SLIM who agrees with plan for ongoing antibiotic.    Antibiotics:  Cefepime 2  Flagyl 4  Antibiotics 4 (plus 9 days of cefazolin earlier in hospitalization)     Subjective:  Patient reports she's tired today, is not looking forward to having another  "surgery. Reports she's weak, her appetite is poor, \"I don't know if I have it in me.\" Patient denies chills, sweats,and shakes. She's been afebrile. She has no nausea or vomiting. She has no cough, shortness of breath, or chest pain. She denies pain in her groins and in the R foot. She offers no other symptoms.    Objective:  Vitals:  Temp:  [96.6 °F (35.9 °C)-98.1 °F (36.7 °C)] 97.3 °F (36.3 °C)  HR:  [86-95] 93  Resp:  [14-16] 14  BP: (102-157)/(61-82) 141/75  SpO2:  [91 %-98 %] 95 %  Temp (24hrs), Av.5 °F (36.4 °C), Min:96.6 °F (35.9 °C), Max:98.1 °F (36.7 °C)  Current: Temperature: (!) 97.3 °F (36.3 °C)    Physical Exam:   General Appearance:  Tired but appropriately interactive, nontoxic, and in no acute distress. She appears comfortable out of bed in the recliner.   Throat: Oropharynx dry without lesions.    Lungs:   Clear to auscultation bilaterally; no wheezes, rhonchi or rales; respirations unlabored on room air.   Heart:  Tachycardic; no murmur, rub or gallop.   Abdomen:   Soft, non-tender, non-distended, positive bowel sounds.  B/L groin wound VAC sites intact without spreading erythema or leakage.   Extremities: R foot dressing is clean and intact.   Skin: No new rashes noted on exposed skin.     Labs, Imaging, & Other studies:   All pertinent labs and imaging studies were personally reviewed  Results from last 7 days   Lab Units 24  0440 24  1715 24  0502 24  1808   WBC Thousand/uL 33.34*  --  31.56* 42.69*   HEMOGLOBIN g/dL 10.5* 10.5* 7.4* 8.3*   PLATELETS Thousands/uL 303  --  318 341     Results from last 7 days   Lab Units 24  0440 24  0502 24  1808   POTASSIUM mmol/L 4.1 4.7 5.2   CHLORIDE mmol/L 107 108 109*   CO2 mmol/L 26 24 19*   BUN mg/dL 77* 77* 70*   CREATININE mg/dL 2.19* 2.37* 2.17*   EGFR ml/min/1.73sq m 21 19 21   CALCIUM mg/dL 8.6 8.2* 8.2*   AST U/L 814* 2,280* 3,936*   ALT U/L 330* 516* 724*   ALK PHOS U/L 139* 105* 111*     Results from " last 7 days   Lab Units 07/29/24  1805   GRAM STAIN RESULT  Rare Polys*  Rare Gram negative rods*

## 2024-08-01 NOTE — ASSESSMENT & PLAN NOTE
Baseline 1.4-1.7  Initial RICARDO likely due to sepsis / pre-renal.  Had resolved, but unfortunately patient with acute blood loss anemia 7/30 and creatinine spiked  Improving to 2.19 on morning labs following transfusion of 2 units PRBCs  Monitor and reconsult nephrology as needed      Recent Labs     07/30/24  1808 07/31/24  0502 08/01/24  0440   BUN 70* 77* 77*   CREATININE 2.17* 2.37* 2.19*   EGFR 21 19 21

## 2024-08-01 NOTE — PROGRESS NOTES
Progress Note - Geriatric Medicine   Anamaria Parnell 79 y.o. female MRN: [id number]  Unit/Bed#: [place] [address] -01 Encounter: [id number]      Assessment/Plan:    Acute Encephalopathy  Presented to the hospital for right great toe pain/gangrene  Baseline mentation is alert and oriented x 4   Patient was noted to have altered mental status on 7/30 and there was some concern for stroke   Workup was negative   Current cause considerations   Suspected to be multifactorial secondary to right hallux gangrene status post right partial first ray amputation, status post bilateral femoral endarterectomy with left to right femorofemoral PTFE bypass and retrograde left JACI, left EIA stenting, and bilateral groin VAC placement, anesthesia, acute pain, acute liver failure without hepatic coma (LFTs improving), elevated troponins, RICARDO (improving), acute blood loss anemia (hemoglobin improving), sleep disturbances, vision impairment, and prolonged hospitalization   Patient was noted to have lactic acidosis the night of 7/30 which has since resolved  UA negative for UTI on admission   Leukocytosis noted on labs today and slightly worse than yesterday (up to 33.34 from 31.56)  ID is currently consulted and following  Hemoglobin improved to 10.5 on labs today   Patient is sleeping on my entry into the room but is arousable to her name   She is oriented to person on exam   She does correctly state that she is in the hospital when given choices of location   She notes that it is June but does not know the year  She is pleasant, calm, and cooperative on my exam today   Identify and treat reversible causes of confusion including infection, dehydration, electrolyte imbalance, anemia, hypoxia, urinary retention, constipation, pain, and sleep disturbance  Maintain delirium precautions   Provide redirection, reorientation, and distraction techniques  Maintain fall and safety precautions   Assist with ADLs/IADLs  Avoid deliriogenic medications  such as tramadol, benzodiazepines, anticholinergics, benadryl  Treat pain using geriatric pain protocol   Encourage oral hydration and nutrition   Monitor for constipation and urinary retention   Implement sleep hygiene and limit night time interuptions   Maintain sleep-wake cycle   Encourage early and frequent mobilization   Most recent EKG on 7/30/2024 revealed a QTc interval of 525  Medication at this time is extremely limited for acute agitation and behaviors   Would avoid antipsychotics for acute agitation and behaviors secondary to prolonged QTc interval   Would also avoid Depakote due to acute liver failure   While not typically recommended in geriatric patients can consider low dose lorazepam 0.25 mg if needed for acute agitation and behaviors as other options are not feasible at this time   Redirect and reorient as needed  Keep mentally, physically, and socially active    Cognitive Screening   Per chart review, it appears that the patient has a prior history of memory loss, however, I do not see any workup for this diagnosis and it does not appear as a documented diagnosis in [facility]  Patients son notes the patient is alert and oriented x 4 at baseline and has no issues or difficulties with her memory   Patient is oriented to person and now has acute encephalopathy as discussed above   She does know place when given choices   She is forgetful as she has been telling nursing that her son has not come to see her but he was here yesterday   She is pleasant, calm, and cooperative without behaviors   Most recent TSH on 7/27/2024 noted to be 2.309  Most recent vitamin B 12 level on 2/25/2019 noted to be 502  Consider checking on routine labs   CT of the head on 7/30/2024 revealed chronic left PCA infarct and microangiopathic changes   No MoCA or cognitive testing noted in epic  Maintain delirium precautions as discussed below  Redirect and reorient as needed  Keep physically, mentally, and socially active      Deconditioning   Patient is at increased risk for deconditioning secondary to right hallux gangrene status post right partial first ray amputation, status post bilateral femoral endarterectomy with left to right femorofemoral PTFE bypass and retrograde left JAIC, left EIA stenting, and bilateral groin VAC placement, anesthesia, acute pain, acute blood loss anemia (hemoglobin improving), weakness, gait dysfunction, and prolonged hospitalization   Continue to optimize diet, hydration, and mobility for healing   Stage IV Chronic Kidney Disease   Baseline creatinine appears to be 1.2-1.8  Patient now with RICARDO though creatinine improved on labs today (down to 2.17 from 2.37 yesterday)  Patient does follow with neurology in the outpatient setting   Nephrology had been consulted but has since signed off   Avoid nephrotoxic medication and renal dose medication   Keep hydrated   PO intake  Patient notes she has a decreased appetite   She notes that she used to have a very good appetite when she was on insulin but since being transitioned to oral tradjenta her appetite has decreased   She does drink carnation breakfast drinks at home  She is receiving glucerna with meals here  Encourage oral hydration and nutrition   Congestive heart failure   Most recent echo on 7/21/2024 revealed an EF of 41%  Patient does not appear to be on any diuretics at baseline  She is currently on torsemide 10 mg daily though this was held on 7/30 and 7/31 due to low blood pressures   Cardiology is consulted and following  Recommend low-sodium diet  Continue to monitor weights and I&O  Management per cardiology  Type II Diabetes   Hemoglobin A1c on 7/9/2024 noted to be 5.9 which is pretty tightly controlled for patient's age  Patient has been on insulin in the past but once her A1c was improved she was transitioned over to Tradjenta which she and her son note have affected her appetite  Patient is currently on blood sugar checks and while blood  sugars have been elevated they are acceptable  Her current regimen is Humalog SSI with meals and Levemir 10 units daily  Continue insulin and diabetic diet  Avoid hypoglycemia  Acute Blood Loss Anemia  Baseline hemoglobin appears to be 11-13  She has been primarily trending in the 7's for the past several days  She was noted to have a hemoglobin of 6.4 on 7/30 and received a unit of blood  Her hemoglobin on labs yesterday was 7.4 and she received another unit of blood  Hemoglobin on labs today stable at 10.5  Continue to monitor CBC  Transfuse for hemoglobin < 7   Monitor for signs and symptoms of infection, dehydration, DVT, and skin breakdown    Frailty   Clinical Frail Scale: 5- Mildly Frail (baseline)  More evident slowing, needs help high order IADLs (transport, bills, medications)  Progressively impairs shopping and walking outside alone, meal prep and housework  Clinical Frail Scale: 6- Moderately Frail (current)  Need help with all outside activities  Need help with stairs and bathing  May need assistance with dressing  Most recent albumin on labs today noted to be 3.6  Consider nutrition consult  Encourage protein supplementation     Ambulatory Dysfunction/Falls  Patient has not had any recent falls at home  She notes she has both a roller walker and cane at home  Her son notes that she has an electric scooter that she primarily uses and then will supplement with the cane when ambulating   PT/OT consulted to assist with strengthening/mobility and assist with discharge planning to appropriate level of care  Assess patient frequently for physical needs, encourage use of assistant devices as needed and directed by PT/OT  Identify cognitive and physical deficits and behaviors that affect risk of falls  Consider moving patient closer to nursing station to monitor more closely for impulsive behavior which may increase risk of falls  [place] fall precautions   Educate patient/family on patient safety including  physical limitations and importance of using call bell for assistance   Modify environment to reduce risk of injury including disconnecting from pole when not in use, ensuring adequate lighting in room and restroom, ensuring that path to restroom is clear and free of trip hazards  Out of bed as tolerated     Impaired Vision   Patient does have vision impairment   She does wear eyeglasses  Recommend use of corrective lenses at all appropriate times  Encourage adequate lighting and encourage use of assistance with ambulation  Keep personal belongings close to avoid reaching  Encourage appropriate footwear at all times  Recommend large font for printed materials provided to patient    Dentition/Appetite   Patient does not wear dentures at baseline   She notes that her appetite is fair as discussed above   Ensure meal consistency is appropriate for all abilities   Consider nutrition consult   Continue aspiration precautions     Elimination   Patient is continent of bowel and bladder at baseline  She has been recently having incontinent episodes here   Suspect this may be related to encephalopathy   Patient has no documented history of constipation   Last documented bowel movement was this morning   She is receiving lactulose TID   Monitor for constipation and urinary retention     Insomnia   Patient notes she has some difficulty sleeping at home   She notes that she sleeps in a recliner chair at baseline   She is on trazodone 50 mg daily at bedtime at baseline   Her son noted that he works overnights and does not get home until around 0300 and she is often awake when he gets home and she sleeps on his schedule  Trazodone is currently on hold   Nursing notes that the patient is sleeping well at night but also sleeps quite a bit during the day   Would hold off on sedating medication including trazodone at this time  First line is behavioral therapy   Avoid sedative hypnotics including benzodiazepines and  benadryl  Encourage staying awake during the day   Encourage daytime activities and morning exercise   Decrease or eliminate daytime naps   Avoid caffeine especially during late afternoon and evening hours  Establish a nighttime routine  Implement sleep hygiene and limit nighttime interruptions    Anxiety/Depression  Patient has a documented history of depression   She is on escitalopram at baseline   Mood appears stable on exam today   Continue current medication regimen    Continue supportive care     Right Hallux Gangrene  Patient noted to have gangrene of the right hallux on admission to [facility]  She was transferred to [facility] for surgical vascular intervention  She was subsequently evaluated by podiatry for this and is now POD 3 from a right partial first ray amputation  Intraoperative cultures were obtained and are pending  Dressing changes to be completed by podiatry  Patient remains on IV antibiotics secondary to leukocytosis  ID is consulted and following  Patient is currently denying pain on exam today  Pain management as discussed below  PT/OT consulted and following  Management per podiatry and ID    Aortoiliac Occlusive Disease   Patient with PAD with occlusion of multiple vessels  Patient was transferred from [facility] to [facility] for surgical vascular intervention  Patient is now POD 8 from a bilateral femoral endarterectomy with left to right femorofemoral PTFE bypass and retrograde left JACI, left EIA stenting, and bilateral groin VAC placement   Wound VAC remains intact  Per chart review tentative plan is to return to the OR tomorrow for a below the knee popliteal artery bypass  Nursing notes vascular has changed the date to Wednesday 8/7 but I dont see any documentation of this at this time  Patient is currently denying pain  Pain management as discussed below  Management per vascular surgery    Acute Liver Failure without Hepatic Coma  Patient with  elevated LFTs suspected to be related to acute illness and cephalosporins  There was some concern for shock on 7/30 as the patient was anemic and had elevated troponins  Ammonia level on 7/30 noted to be elevated at 73  Patient was started on lactulose and ammonia level on labs yesterday noted to be improved 48  GI is now consulted and following  And ultrasound of the right upper quadrant yesterday revealed the common duct is mildly dilated at 8 mm  Recommended nonemergent MRCP for persistent LFT abnormality  Management per primary team and GI    Elevated Troponins  Patient was noted to have elevated troponins on 7/30  She was noted to be tachycardic and anemic  She did receive a unit of blood on 7/30 and anemia improved  She is currently being monitored on telemetry  Cardiology is consulted and following  Management per cardiology and primary team    Acute Pain due to Surgery  Patient is POD 3 from a right partial first ray amputation and POD 8 from a bilateral femoral endarterectomy with left to right femorofemoral PTFE bypass and retrograde left AJCI, left EIA stenting, and bilateral groin VAC placement   She is currently denying pain on exam today  Would recommend treating pain using the geriatric pain protocol as discussed below  Oxycodone 2.5 mg po Q 4 prn moderate pain  Oxycodone 5 mg po Q 4 prn severe pain   Dilaudid 0.2 mg IV Q 4 prn breakthrough pain  Would avoid Tylenol and gabapentin at this time secondary to acute liver failure and RICARDO  Monitor for constipation  Continue nonpharmacological methods of pain management      Subjective:   The patient is being seen and evaluated today at the bedside for geriatric follow-up.  She is noted to be sitting up in her recliner chair no acute distress.  She is sleeping on my entry into the room but is arousable to her name.  She is oriented to person.  She is able to states she is in the hospital when given choices of location.  She knows the month to be June and she  "states she does not know the year.  She is pleasant, calm, and cooperative.  She is currently denying pain.  She denies chest pain or shortness of breath.  She notes that she has not been very hungry and has a decreased appetite.  She is not sure if she slept last night.    Care was coordinated with patients nurse Stefania.  She notes no acute issues or events overnight.  She states that vascular surgery has postponed the patient's next procedure until next week.    Review of Systems   Constitutional:  Positive for activity change, appetite change (decreased appetite) and fatigue.   HENT:  Negative for dental problem and hearing loss.    Eyes:  Positive for visual disturbance (glasses).   Respiratory:  Negative for cough and shortness of breath.    Cardiovascular:  Negative for chest pain and leg swelling.   Gastrointestinal:  Negative for abdominal distention and abdominal pain.   Genitourinary:  Negative for difficulty urinating and dysuria.   Musculoskeletal:  Positive for gait problem. Negative for arthralgias.   Skin:  Negative for color change and pallor.   Neurological:  Positive for weakness. Negative for speech difficulty.   Psychiatric/Behavioral:  Positive for confusion. Negative for agitation and sleep disturbance. The patient is not nervous/anxious.          Objective:     Vitals: Blood pressure 142/77, pulse 94, temperature 97.8 °F (36.6 °C), temperature source Oral, resp. rate 16, height 4' 10\" (1.473 m), weight 61.9 kg (136 lb 8 oz), SpO2 96%.,Body mass index is 28.53 kg/m².      Intake/Output Summary (Last 24 hours) at 8/1/2024 1014  Last data filed at 8/1/2024 0559  Gross per 24 hour   Intake 1360 ml   Output 354 ml   Net 1006 ml       Current Medications: Reviewed    Physical Exam:   Physical Exam  Vitals and nursing note reviewed.   Constitutional:       General: She is not in acute distress.     Appearance: She is not ill-appearing.   HENT:      Head: Normocephalic.      Mouth/Throat:      Mouth: " "Mucous membranes are dry.   Eyes:      General: No scleral icterus.     Conjunctiva/sclera: Conjunctivae normal.   Cardiovascular:      Rate and Rhythm: Normal rate and regular rhythm.   Pulmonary:      Effort: Pulmonary effort is normal. No respiratory distress.   Abdominal:      General: Bowel sounds are normal. There is no distension.      Palpations: Abdomen is soft.      Tenderness: There is no abdominal tenderness.   Musculoskeletal:         General: No swelling or tenderness.   Skin:     General: Skin is warm and dry.      Comments: Wound VAC to groin   Right foot dressing clean, dry, and intact   Neurological:      Mental Status: She is alert. She is disoriented.      Motor: Weakness present.      Gait: Gait abnormal.      Comments: Oriented to person   Correctly states place when given options   Notes the month to be June but does not know the year          Invasive Devices       Peripheral Intravenous Line  Duration             Peripheral IV 07/30/24 Left;Proximal;Ventral (anterior) Antecubital 1 day    Peripheral IV 07/31/24 Right Antecubital 1 day                    Lab, Imaging and other studies: I have personally reviewed pertinent reports.      Please note:  Voice-recognition software may have been used in the preparation of this document.  Occasional wrong word or \"sound-alike\" substitutions may have occurred due to the inherent limitations of voice recognition software.  Interpretation should be guided by context.    "

## 2024-08-01 NOTE — ASSESSMENT & PLAN NOTE
Patient presenting with right big toe gangrene and associated cellulitis  Patient remains afebrile, nontoxic; however white count trended up substantially overnight, source control needed  Continue IV Rocephin  Patient underwent right hallux amputation on 7/29, followed by podiatry

## 2024-08-01 NOTE — ASSESSMENT & PLAN NOTE
Lab Results   Component Value Date    HGBA1C 5.9 (H) 07/09/2024     Recent Labs     07/31/24  1117 07/31/24  1634 07/31/24  2141 08/01/24  0741   POCGLU 260* 247* 253* 233*       Home regimen: Tradjenta  Inpatient regimen: Sliding scale    7/31-update: Patient's glucose levels not at goal, may represent physiological reaction to ongoing treatment plan.  Have added 10 units long-acting insulin daily, monitor and uptitrate/add prandial insulin as needed    8/1-update: Blood glucose level still above goal, will initiate 3 units prandial insulin 3 times daily with meals

## 2024-08-01 NOTE — ASSESSMENT & PLAN NOTE
Patient noted to have elevated troponin this afternoon as part of encephalopathy workup; currently 4677  Tachycardic overnight and anemic, likely elevated in demand ischemia  Have reversed anemia with blood transfusion hemoglobin currently 10.5  Monitor on telemetry; EKG with no acute findings  Have discussed with cardiology, reverse likely contributing factors and reassess  Unfortunately, echocardiogram obtained and workup of elevated troponin shows worsening EF and global hypokinesis

## 2024-08-01 NOTE — ASSESSMENT & PLAN NOTE
Wt Readings from Last 3 Encounters:   08/01/24 61.9 kg (136 lb 8 oz)   07/19/24 67.2 kg (148 lb 2.4 oz)   07/15/24 65.3 kg (144 lb)     Followed by cardiology, continue diuretics  AV node blockers on hold secondary to bradycardia  Remains 96% SpO2 on room air, not in acute exacerbation

## 2024-08-01 NOTE — ASSESSMENT & PLAN NOTE
Patient noted to be acutely confused on morning of 7/30  Likely multifactorial attributable to late afternoon early evening anesthesia and lack of sleep overnight  Workup revealed several abnormalities as noted above, most have been reversed with transfusion of PRBCs  Mentation much improved; appreciate geriatric recommendations

## 2024-08-01 NOTE — PROGRESS NOTES
Progress Note - Cardiology   Anamaria Parnell 79 y.o. female MRN: [id number]  Unit/Bed#: [place] [address] -01 Encounter: [id number]      Assessment/Recommendations/Discussion:   Right great toe gangrene/osteomyelitis  Severe peripheral artery disease status post revascularization  Paroxysmal atrial fibrillation with suspected tachybradycardia syndrome with a baseline left bundle branch block and first-degree AV block with occasional high degree AV block while on beta-blocker, now resolved  Beta-blocker toxicity now resolved  Postsurgical anemia  Underlying coronary artery disease with reduced and regional wall motion abnormalities  History of stroke on dual to platelet therapy  Hypertension  Type 2 diabetes  Stage III-IV chronic kidney disease  Aortoiliac occlusive disease  Renal artery stenosis  Mesenteric artery stenosis  Ambulatory dysfunction  Results from last 7 days   Lab Units 07/31/24  1630   SL CV LV EF  30     Echo 7/21  Left Ventricle: Left ventricular cavity size is normal. Wall thickness is severely increased. The left ventricular ejection fraction is 41% by biplane measurement. Systolic function is mildly reduced. Global longitudinal strain is reduced at -10% with moderate to severe strain reduction in the mid to basal anteroseptal septal and inferior walls.. Unable to grade diastolic dysfunction given the severe degree of mitral annular calcification.    The following segments are hypokinetic: basal inferoseptal, mid inferoseptal, apical septal, apical inferior, apical lateral and apex.    Echo 7/30  Left Ventricle: Wall thickness is moderately increased. The left ventricular ejection fraction is 30% by visual estimation. Systolic function is moderately to severely reduced. There is moderate global hypokinesis with regional variation. In particular, the entire inferoseptal wall and mid to apical inferior wall is severely hypokinetic.     PLAN  Her EF is lower on repeat echo after surgery. Global  "hypokinesis with regional abnormalities similar to prior along with Trop elevation , multifactorial and likely stress induced from surgery on top of known underlying CAD in very high risk patient comorbidities (DM, severe PAD, HTN, CVA history, CKD) and septic type picture. WBC remains very elevated > 30,000  She absolutely denies any chest pain / SOB or angina type symptoms  She appears compensated with regards to CHF  .  Her mentation has improved.  She expressed to me today however that she does not want any more surgery -> feels fed up with everything and feels \"tired\" like she doesn't want to go through this anymore... -> may need eval with palliative care or geriatrics or extensive goals of care discussion.  .  Her risk for another surgery from CV perspective is very high. Now with further reduced EF and with her many high risk comorbidities as described above. Hgb now >10. Continue asa brilinta, torsemide, add BB toprol xl 25    Subjective:   HPI  She denies any chest pain or shortness of breath.  Echo yesterday with further reduced EF to 30%      Review of Systems: As noted in HPI. Rest of ROS is negative.    Vitals:   /77 (BP Location: Left arm)   Pulse 94   Temp 97.8 °F (36.6 °C) (Oral)   Resp 16   Ht 4' 10\" (1.473 m)   Wt 61.9 kg (136 lb 8 oz)   LMP  (LMP Unknown)   SpO2 96%   BMI 28.53 kg/m²   I/O         07/30 0701  07/31 0700 07/31 0701  08/01 0700 08/01 0701  08/02 0700    P.O. 260 1440     I.V. (mL/kg)       Blood 350 350     IV Piggyback  50     Total Intake(mL/kg) 610 (8.3) 1840 (29.7)     Urine (mL/kg/hr) 150 (0.1) 329 (0.2)     Drains 475 25     Stool  0     Total Output 625 354     Net -15 +1486            Unmeasured Urine Occurrence 1 x 1 x     Unmeasured Stool Occurrence  2 x           Weight (last 2 days)       Date/Time Weight    08/01/24 0559 61.9 (136.5)    07/31/24 1623 73.1 (161.16)    07/31/24 0600 73.1 (161.16)            Physical Exam   Constitutional: awake, alert and " oriented, in no acute distress, no obvious deformities  Head: Normocephalic, without obvious abnormality, atraumatic  Eyes: conjunctivae clear and moist. Sclera anicteric. No xanthelasmas. Pupils equal bilaterally. Extraocular motions are full.  Ear nose mouth and throat: ears are symmetrical bilaterally, hearing appears to be equal bilaterally, no nasal discharge or epistaxis, oropharynx is clear with moist mucous membranes  Neck: Trachea is midline, neck is supple, no thyromegaly or significant lymphadenopathy, there is full range of motion.  Lungs: clear to auscultation bilaterally, no wheezes, no rales, no rhonchi, no accessory muscle use, breathing is nonlabored  Heart: regular rate and rhythm, S1, S2 normal, no murmur, no click, no rub and no gallop, no lower extremity edema  Abdomen: soft, non-tender; bowel sounds normal; no masses, no organomegaly  Psychiatric: Patient is oriented to time, place, person, mood/affect is negative for depression, anxiety, agitation, appears to have appropriate insight  Skin: Right foot wound with dressing in place.    TELEMETRY:   Lab Results:  Results from last 7 days   Lab Units 08/01/24  0440   WBC Thousand/uL 33.34*   HEMOGLOBIN g/dL 10.5*   HEMATOCRIT % 32.7*   PLATELETS Thousands/uL 303     Results from last 7 days   Lab Units 08/01/24  0440   POTASSIUM mmol/L 4.1   CHLORIDE mmol/L 107   CO2 mmol/L 26   BUN mg/dL 77*   CREATININE mg/dL 2.19*   CALCIUM mg/dL 8.6   ALK PHOS U/L 139*   ALT U/L 330*   AST U/L 814*     Results from last 7 days   Lab Units 08/01/24  0440   POTASSIUM mmol/L 4.1   CHLORIDE mmol/L 107   CO2 mmol/L 26   BUN mg/dL 77*   CREATININE mg/dL 2.19*   CALCIUM mg/dL 8.6           Medications:    Current Facility-Administered Medications:     allopurinol (ZYLOPRIM) tablet 100 mg, 100 mg, Oral, Daily, Costa Omer DPM, 100 mg at 08/01/24 0936    aspirin chewable tablet 81 mg, 81 mg, Oral, Daily, Costa Omer DPM, 81 mg at 08/01/24 0936     "cefepime (MAXIPIME) 1,000 mg in dextrose 5 % 50 mL IVPB, 1,000 mg, Intravenous, Q24H, Soraida Tobias PA-C, Last Rate: 100 mL/hr at 08/01/24 0041, 1,000 mg at 08/01/24 0041    escitalopram (LEXAPRO) tablet 20 mg, 20 mg, Oral, Daily, Costa Omer DPM, 20 mg at 08/01/24 0936    HYDROmorphone HCl (DILAUDID) injection 0.2 mg, 0.2 mg, Intravenous, Q6H PRN, Costa Omer DPM    insulin detemir (LEVEMIR) subcutaneous injection 10 Units, 10 Units, Subcutaneous, Daily, Cullen Reinoso MD, 10 Units at 08/01/24 0936    insulin lispro (HumALOG/ADMELOG) 100 units/mL subcutaneous injection 1-5 Units, 1-5 Units, Subcutaneous, TID AC, 2 Units at 08/01/24 0800 **AND** Fingerstick Glucose (POCT), , , TID AC, Costa Omer DPM    insulin lispro (HumALOG/ADMELOG) 100 units/mL subcutaneous injection 3 Units, 3 Units, Subcutaneous, TID With Meals, Cullen Reinoso MD    lactulose (CHRONULAC) oral solution 20 g, 20 g, Oral, TID, Sroaida Tobias PA-C, 20 g at 08/01/24 0935    lidocaine (LIDODERM) 5 % patch 1 patch, 1 patch, Topical, Daily, Costa Omer DPM, 1 patch at 08/01/24 0935    metroNIDAZOLE (FLAGYL) tablet 500 mg, 500 mg, Oral, Q8H JAVIER, Costa Omer DPM, 500 mg at 08/01/24 0518    ondansetron (ZOFRAN) injection 4 mg, 4 mg, Intravenous, Q6H PRN, Costa Omer DPM    oxyCODONE (ROXICODONE) IR tablet 5 mg, 5 mg, Oral, Q6H PRN, Costa Omer DPM, 5 mg at 07/29/24 1954    sodium bicarbonate tablet 650 mg, 650 mg, Oral, BID after meals, Costa Omer DPM, 650 mg at 08/01/24 0830    ticagrelor (BRILINTA) tablet 90 mg, 90 mg, Oral, Q12H JAVIER, APRIL BarrosoM, 90 mg at 08/01/24 0938    torsemide (DEMADEX) tablet 10 mg, 10 mg, Oral, Daily, Costa Omer DPM, 10 mg at 08/01/24 0938    Portions of the record may have been created with voice recognition software. Occasional wrong word or \"sound a like\" substitutions may have occurred due to the inherent " limitations of voice recognition software. Read the chart carefully and recognize, using context, where substitutions have occurred.    Timothy Rey DO, [facility], [facility]  8/1/2024 9:53 AM

## 2024-08-02 LAB
ABO GROUP BLD BPU: NORMAL
ALBUMIN SERPL BCG-MCNC: 3.4 G/DL (ref 3.5–5)
ALP SERPL-CCNC: 143 U/L (ref 34–104)
ALT SERPL W P-5'-P-CCNC: 246 U/L (ref 7–52)
ANION GAP SERPL CALCULATED.3IONS-SCNC: 12 MMOL/L (ref 4–13)
AST SERPL W P-5'-P-CCNC: 362 U/L (ref 13–39)
BASOPHILS # BLD AUTO: 0.08 THOUSANDS/ÂΜL (ref 0–0.1)
BASOPHILS NFR BLD AUTO: 0 % (ref 0–1)
BILIRUB SERPL-MCNC: 1.19 MG/DL (ref 0.2–1)
BPU ID: NORMAL
BUN SERPL-MCNC: 69 MG/DL (ref 5–25)
CALCIUM ALBUM COR SERPL-MCNC: 8.9 MG/DL (ref 8.3–10.1)
CALCIUM SERPL-MCNC: 8.4 MG/DL (ref 8.4–10.2)
CHLORIDE SERPL-SCNC: 104 MMOL/L (ref 96–108)
CO2 SERPL-SCNC: 25 MMOL/L (ref 21–32)
CREAT SERPL-MCNC: 1.88 MG/DL (ref 0.6–1.3)
CROSSMATCH: NORMAL
EOSINOPHIL # BLD AUTO: 0 THOUSAND/ÂΜL (ref 0–0.61)
EOSINOPHIL NFR BLD AUTO: 0 % (ref 0–6)
ERYTHROCYTE [DISTWIDTH] IN BLOOD BY AUTOMATED COUNT: 21.2 % (ref 11.6–15.1)
GFR SERPL CREATININE-BSD FRML MDRD: 25 ML/MIN/1.73SQ M
GLUCOSE SERPL-MCNC: 188 MG/DL (ref 65–140)
GLUCOSE SERPL-MCNC: 193 MG/DL (ref 65–140)
GLUCOSE SERPL-MCNC: 217 MG/DL (ref 65–140)
GLUCOSE SERPL-MCNC: 219 MG/DL (ref 65–140)
GLUCOSE SERPL-MCNC: 225 MG/DL (ref 65–140)
HCT VFR BLD AUTO: 34 % (ref 34.8–46.1)
HGB BLD-MCNC: 10.9 G/DL (ref 11.5–15.4)
IMM GRANULOCYTES # BLD AUTO: >0.5 THOUSAND/UL (ref 0–0.2)
IMM GRANULOCYTES NFR BLD AUTO: 5 % (ref 0–2)
LYMPHOCYTES # BLD AUTO: 0.62 THOUSANDS/ÂΜL (ref 0.6–4.47)
LYMPHOCYTES NFR BLD AUTO: 2 % (ref 14–44)
MAGNESIUM SERPL-MCNC: 2.1 MG/DL (ref 1.9–2.7)
MCH RBC QN AUTO: 29.6 PG (ref 26.8–34.3)
MCHC RBC AUTO-ENTMCNC: 32.1 G/DL (ref 31.4–37.4)
MCV RBC AUTO: 92 FL (ref 82–98)
MONOCYTES # BLD AUTO: 1.39 THOUSAND/ÂΜL (ref 0.17–1.22)
MONOCYTES NFR BLD AUTO: 5 % (ref 4–12)
NEUTROPHILS # BLD AUTO: 22.37 THOUSANDS/ÂΜL (ref 1.85–7.62)
NEUTS SEG NFR BLD AUTO: 88 % (ref 43–75)
NRBC BLD AUTO-RTO: 1 /100 WBCS
PHOSPHATE SERPL-MCNC: 3.9 MG/DL (ref 2.3–4.1)
PLATELET # BLD AUTO: 272 THOUSANDS/UL (ref 149–390)
PMV BLD AUTO: 10.5 FL (ref 8.9–12.7)
POTASSIUM SERPL-SCNC: 3.5 MMOL/L (ref 3.5–5.3)
PROT SERPL-MCNC: 6 G/DL (ref 6.4–8.4)
RBC # BLD AUTO: 3.68 MILLION/UL (ref 3.81–5.12)
SODIUM SERPL-SCNC: 141 MMOL/L (ref 135–147)
UNIT DISPENSE STATUS: NORMAL
UNIT PRODUCT CODE: NORMAL
UNIT PRODUCT VOLUME: 275 ML
UNIT RH: NORMAL
WBC # BLD AUTO: 25.63 THOUSAND/UL (ref 4.31–10.16)

## 2024-08-02 PROCEDURE — 84100 ASSAY OF PHOSPHORUS: CPT | Performed by: INTERNAL MEDICINE

## 2024-08-02 PROCEDURE — 99232 SBSQ HOSP IP/OBS MODERATE 35: CPT | Performed by: INTERNAL MEDICINE

## 2024-08-02 PROCEDURE — 99233 SBSQ HOSP IP/OBS HIGH 50: CPT | Performed by: INTERNAL MEDICINE

## 2024-08-02 PROCEDURE — 85027 COMPLETE CBC AUTOMATED: CPT | Performed by: INTERNAL MEDICINE

## 2024-08-02 PROCEDURE — 99232 SBSQ HOSP IP/OBS MODERATE 35: CPT

## 2024-08-02 PROCEDURE — NC001 PR NO CHARGE: Performed by: SURGERY

## 2024-08-02 PROCEDURE — 82948 REAGENT STRIP/BLOOD GLUCOSE: CPT

## 2024-08-02 PROCEDURE — 80053 COMPREHEN METABOLIC PANEL: CPT | Performed by: INTERNAL MEDICINE

## 2024-08-02 PROCEDURE — 97606 NEG PRS WND THER DME>50 SQCM: CPT | Performed by: NURSE PRACTITIONER

## 2024-08-02 PROCEDURE — 97535 SELF CARE MNGMENT TRAINING: CPT

## 2024-08-02 PROCEDURE — 97530 THERAPEUTIC ACTIVITIES: CPT

## 2024-08-02 PROCEDURE — 99233 SBSQ HOSP IP/OBS HIGH 50: CPT

## 2024-08-02 PROCEDURE — 83735 ASSAY OF MAGNESIUM: CPT | Performed by: INTERNAL MEDICINE

## 2024-08-02 PROCEDURE — NC001 PR NO CHARGE: Performed by: PODIATRIST

## 2024-08-02 RX ORDER — HEPARIN SODIUM 5000 [USP'U]/ML
5000 INJECTION, SOLUTION INTRAVENOUS; SUBCUTANEOUS EVERY 8 HOURS SCHEDULED
Status: DISCONTINUED | OUTPATIENT
Start: 2024-08-02 | End: 2024-08-03

## 2024-08-02 RX ADMIN — LACTULOSE 20 G: 20 SOLUTION ORAL at 17:15

## 2024-08-02 RX ADMIN — TORSEMIDE 10 MG: 10 TABLET ORAL at 09:22

## 2024-08-02 RX ADMIN — LACTULOSE 20 G: 20 SOLUTION ORAL at 09:21

## 2024-08-02 RX ADMIN — DEXTROSE 1000 MG: 50 INJECTION, SOLUTION INTRAVENOUS at 23:54

## 2024-08-02 RX ADMIN — ASPIRIN 81 MG CHEWABLE TABLET 81 MG: 81 TABLET CHEWABLE at 09:18

## 2024-08-02 RX ADMIN — METOPROLOL SUCCINATE 25 MG: 25 TABLET, EXTENDED RELEASE ORAL at 09:18

## 2024-08-02 RX ADMIN — OXYCODONE HYDROCHLORIDE 5 MG: 5 TABLET ORAL at 13:40

## 2024-08-02 RX ADMIN — HEPARIN SODIUM 5000 UNITS: 5000 INJECTION INTRAVENOUS; SUBCUTANEOUS at 22:37

## 2024-08-02 RX ADMIN — ALLOPURINOL 100 MG: 100 TABLET ORAL at 09:18

## 2024-08-02 RX ADMIN — TICAGRELOR 90 MG: 90 TABLET ORAL at 09:22

## 2024-08-02 RX ADMIN — SODIUM BICARBONATE 650 MG TABLET 650 MG: at 09:18

## 2024-08-02 RX ADMIN — INSULIN LISPRO 3 UNITS: 100 INJECTION, SOLUTION INTRAVENOUS; SUBCUTANEOUS at 12:00

## 2024-08-02 RX ADMIN — INSULIN LISPRO 1 UNITS: 100 INJECTION, SOLUTION INTRAVENOUS; SUBCUTANEOUS at 12:00

## 2024-08-02 RX ADMIN — INSULIN DETEMIR 10 UNITS: 100 INJECTION, SOLUTION SUBCUTANEOUS at 09:20

## 2024-08-02 RX ADMIN — INSULIN LISPRO 1 UNITS: 100 INJECTION, SOLUTION INTRAVENOUS; SUBCUTANEOUS at 08:00

## 2024-08-02 RX ADMIN — SODIUM BICARBONATE 650 MG TABLET 650 MG: at 17:15

## 2024-08-02 RX ADMIN — ESCITALOPRAM OXALATE 20 MG: 20 TABLET ORAL at 09:18

## 2024-08-02 RX ADMIN — INSULIN LISPRO 3 UNITS: 100 INJECTION, SOLUTION INTRAVENOUS; SUBCUTANEOUS at 08:00

## 2024-08-02 RX ADMIN — LACTULOSE 20 G: 20 SOLUTION ORAL at 22:37

## 2024-08-02 RX ADMIN — INSULIN LISPRO 2 UNITS: 100 INJECTION, SOLUTION INTRAVENOUS; SUBCUTANEOUS at 17:00

## 2024-08-02 RX ADMIN — TICAGRELOR 90 MG: 90 TABLET ORAL at 22:37

## 2024-08-02 RX ADMIN — INSULIN LISPRO 3 UNITS: 100 INJECTION, SOLUTION INTRAVENOUS; SUBCUTANEOUS at 17:15

## 2024-08-02 NOTE — PROCEDURES
Vascular Surgery  Anamaria Parnell 79 y.o. female MRN: [id number]  Unit/Bed#: [address]-01 Encounter: [id number]    ISAAC Procedure Note    Date: 8/2/24  Time: 13:30    Location of wound: B/L groins    Sponges removed:  3 Black Sponges: 1 from each wound, 1 bridge  0 White Sponges    Dimensions of wound: Right: 6 cm x 3 cm x 2 cm   Left: 6 cm x 3 cm x 2.3 cm    Description of wound: B/L surgical groin incisions w/ fatty tissue visible. Moderate amount of serous drainage from both sites.       R groin    L groin    Sponges placed:  3 Black Sponges: 1 to each groin, 1 bridge  0 White Sponges    VAC settings:  125 mmHg  Continuous    Pt tolerated procedure well  VAC sticker placed to wound dressing, per protocol      GRACIELA Garcia  8/2/2024

## 2024-08-02 NOTE — ASSESSMENT & PLAN NOTE
Lab Results   Component Value Date    HGBA1C 5.9 (H) 07/09/2024     Recent Labs     08/01/24  1137 08/01/24  1601 08/01/24  2102 08/02/24  0755   POCGLU 272* 227* 242* 188*       Home regimen: Tradjenta  Inpatient regimen: Sliding scale    7/31-update: Patient's glucose levels not at goal, may represent physiological reaction to ongoing treatment plan.  Have added 10 units long-acting insulin daily, monitor and uptitrate/add prandial insulin as needed    8/1-update: Blood glucose level still above goal, will initiate 3 units prandial insulin 3 times daily with meals

## 2024-08-02 NOTE — PLAN OF CARE
Problem: PAIN - ADULT  Goal: Verbalizes/displays adequate comfort level or baseline comfort level  Description: Interventions:  - Encourage patient to monitor pain and request assistance  - Assess pain using appropriate pain scale  - Administer analgesics based on type and severity of pain and evaluate response  - Implement non-pharmacological measures as appropriate and evaluate response  - Consider cultural and social influences on pain and pain management  - Notify physician/advanced practitioner if interventions unsuccessful or patient reports new pain  Outcome: Progressing     Problem: INFECTION - ADULT  Goal: Absence or prevention of progression during hospitalization  Description: INTERVENTIONS:  - Assess and monitor for signs and symptoms of infection  - Monitor lab/diagnostic results  - Monitor all insertion sites, i.e. indwelling lines, tubes, and drains  - Monitor endotracheal if appropriate and nasal secretions for changes in amount and color  - [place] appropriate cooling/warming therapies per order  - Administer medications as ordered  - Instruct and encourage patient and family to use good hand hygiene technique  - Identify and instruct in appropriate isolation precautions for identified infection/condition  Outcome: Progressing     Problem: SAFETY ADULT  Goal: Patient will remain free of falls  Description: INTERVENTIONS:  - Educate patient/family on patient safety including physical limitations  - Instruct patient to call for assistance with activity   - Consult OT/PT to assist with strengthening/mobility   - Keep Call bell within reach  - Keep bed low and locked with side rails adjusted as appropriate  - Keep care items and personal belongings within reach  - Initiate and maintain comfort rounds  - Make Fall Risk Sign visible to staff  - Offer Toileting every 2 Hours, in advance of need  - Initiate/Maintain bed alarm  - Apply yellow socks and bracelet for high fall risk patients  - Consider  moving patient to room near nurses station  Outcome: Progressing  Goal: Maintain or return to baseline ADL function  Description: INTERVENTIONS:  -  Assess patient's ability to carry out ADLs; assess patient's baseline for ADL function and identify physical deficits which impact ability to perform ADLs (bathing, care of mouth/teeth, toileting, grooming, dressing, etc.)  - Assess/evaluate cause of self-care deficits   - Assess range of motion  - Assess patient's mobility; develop plan if impaired  - Assess patient's need for assistive devices and provide as appropriate  - Encourage maximum independence but intervene and supervise when necessary  - Involve family in performance of ADLs  - Assess for home care needs following discharge   - Consider OT consult to assist with ADL evaluation and planning for discharge  - Provide patient education as appropriate  Outcome: Progressing  Goal: Maintains/Returns to pre admission functional level  Description: INTERVENTIONS:  - Perform AM-PAC 6 Click Basic Mobility/ Daily Activity assessment daily.  - Set and communicate daily mobility goal to care team and patient/family/caregiver.   - Collaborate with rehabilitation services on mobility goals if consulted  - Stand patient 3 times a day  - Ambulate patient 3 times a day  - Out of bed to chair 3 times a day   - Out of bed for meals 3 times a day  - Out of bed for toileting  - Record patient progress and toleration of activity level   Outcome: Progressing     Problem: DISCHARGE PLANNING  Goal: Discharge to home or other facility with appropriate resources  Description: INTERVENTIONS:  - Identify barriers to discharge w/patient and caregiver  - Arrange for needed discharge resources and transportation as appropriate  - Identify discharge learning needs (meds, wound care, etc.)  - Arrange for interpretive services to assist at discharge as needed  - Refer to Case Management Department for coordinating discharge planning if the  patient needs post-hospital services based on physician/advanced practitioner order or complex needs related to functional status, cognitive ability, or social support system  Outcome: Progressing     Problem: Knowledge Deficit  Goal: Patient/family/caregiver demonstrates understanding of disease process, treatment plan, medications, and discharge instructions  Description: Complete learning assessment and assess knowledge base.  Interventions:  - Provide teaching at level of understanding  - Provide teaching via preferred learning methods  Outcome: Progressing     Problem: CARDIOVASCULAR - ADULT  Goal: Maintains optimal cardiac output and hemodynamic stability  Description: INTERVENTIONS:  - Monitor I/O, vital signs and rhythm  - Monitor for S/S and trends of decreased cardiac output  - Administer and titrate ordered vasoactive medications to optimize hemodynamic stability  - Assess quality of pulses, skin color and temperature  - Assess for signs of decreased coronary artery perfusion  - Instruct patient to report change in severity of symptoms  Outcome: Progressing     Problem: METABOLIC, FLUID AND ELECTROLYTES - ADULT  Goal: Electrolytes maintained within normal limits  Description: INTERVENTIONS:  - Monitor labs and assess patient for signs and symptoms of electrolyte imbalances  - Administer electrolyte replacement as ordered  - Monitor response to electrolyte replacements, including repeat lab results as appropriate  - Instruct patient on fluid and nutrition as appropriate  Outcome: Progressing  Goal: Fluid balance maintained  Description: INTERVENTIONS:  - Monitor labs   - Monitor I/O and WT  - Instruct patient on fluid and nutrition as appropriate  - Assess for signs & symptoms of volume excess or deficit  Outcome: Progressing  Goal: Glucose maintained within target range  Description: INTERVENTIONS:  - Monitor Blood Glucose as ordered  - Assess for signs and symptoms of hyperglycemia and hypoglycemia  -  Administer ordered medications to maintain glucose within target range  - Assess nutritional intake and initiate nutrition service referral as needed  Outcome: Progressing     Problem: HEMATOLOGIC - ADULT  Goal: Maintains hematologic stability  Description: INTERVENTIONS  - Assess for signs and symptoms of bleeding or hemorrhage  - Monitor labs  - Administer supportive blood products/factors as ordered and appropriate  Outcome: Progressing     Problem: MUSCULOSKELETAL - ADULT  Goal: Maintain or return mobility to safest level of function  Description: INTERVENTIONS:  - Assess patient's ability to carry out ADLs; assess patient's baseline for ADL function and identify physical deficits which impact ability to perform ADLs (bathing, care of mouth/teeth, toileting, grooming, dressing, etc.)  - Assess/evaluate cause of self-care deficits   - Assess range of motion  - Assess patient's mobility  - Assess patient's need for assistive devices and provide as appropriate  - Encourage maximum independence but intervene and supervise when necessary  - Involve family in performance of ADLs  - Assess for home care needs following discharge   - Consider OT consult to assist with ADL evaluation and planning for discharge  - Provide patient education as appropriate  Outcome: Progressing  Goal: Maintain proper alignment of affected body part  Description: INTERVENTIONS:  - Support, maintain and protect limb and body alignment  - Provide patient/ family with appropriate education  Outcome: Progressing     Problem: Prexisting or High Potential for Compromised Skin Integrity  Goal: Skin integrity is maintained or improved  Description: INTERVENTIONS:  - Identify patients at risk for skin breakdown  - Assess and monitor skin integrity  - Assess and monitor nutrition and hydration status  - Monitor labs   - Assess for incontinence   - Turn and reposition patient  - Assist with mobility/ambulation  - Relieve pressure over bony  prominences  - Avoid friction and shearing  - Provide appropriate hygiene as needed including keeping skin clean and dry  - Evaluate need for skin moisturizer/barrier cream  - Collaborate with interdisciplinary team   - Patient/family teaching  - Consider wound care consult   Outcome: Progressing     Problem: Nutrition/Hydration-ADULT  Goal: Nutrient/Hydration intake appropriate for improving, restoring or maintaining nutritional needs  Description: Monitor and assess patient's nutrition/hydration status for malnutrition. Collaborate with interdisciplinary team and initiate plan and interventions as ordered.  Monitor patient's weight and dietary intake as ordered or per policy. Utilize nutrition screening tool and intervene as necessary. Determine patient's food preferences and provide high-protein, high-caloric foods as appropriate.     INTERVENTIONS:  - Monitor oral intake, urinary output, labs, and treatment plans  - Assess nutrition and hydration status and recommend course of action  - Evaluate amount of meals eaten  - Assist patient with eating if necessary   - Allow adequate time for meals  - Recommend/ encourage appropriate diets, oral nutritional supplements, and vitamin/mineral supplements  - Order, calculate, and assess calorie counts as needed  - Recommend, monitor, and adjust tube feedings and TPN/PPN based on assessed needs  - Assess need for intravenous fluids  - Provide specific nutrition/hydration education as appropriate  - Include patient/family/caregiver in decisions related to nutrition  Outcome: Progressing     Problem: NEUROSENSORY - ADULT  Goal: Achieves maximal functionality and self care  Description: INTERVENTIONS  - Monitor swallowing and airway patency with patient fatigue and changes in neurological status  - Encourage and assist patient to increase activity and self care.   - Encourage visually impaired, hearing impaired and aphasic patients to use assistive/communication  devices  Outcome: Progressing

## 2024-08-02 NOTE — RESTORATIVE TECHNICIAN NOTE
Restorative Technician Note      Patient Name: Anamaria Parnell     Restorative Tech Visit Date: 08/02/24  Note Type: Mobility  Patient Position Upon Consult: Bedside chair  Mobility / Activity Provided: Assisted PT with slide board transfer to bed  Activity Performed: Transferred; Dangled; Repositioned  Assistive Device: Other (Comment) (Slide board)  Patient Position at End of Consult: All needs within reach; Supine

## 2024-08-02 NOTE — PROGRESS NOTES
[facility]  Progress Note  Name: Anamaria Parnell I  MRN: [id number]  Unit/Bed#: [place] [address] -01 I Date of Admission: 7/19/2024   Date of Service: 8/2/2024 I Hospital Day: 14    Assessment & Plan   * Aortoiliac occlusive disease (HCC)  Assessment & Plan  PAD and aortic disease, R CLTI and R great toe dry gangrene. S/P Bilateral femoral endarterectomy with L to R fem-fem PTFE bypass and retrograde L JACI, L EIA stenting, bilateral groin VAC placement on 7/24   Intervention planned for Tuesday, 8/6    Acute blood loss anemia  Assessment & Plan  Patient noted to have anemia on 7/30 labs, likely secondary to recent surgical procedures  Has received 2 units PRBCs  Given other comorbidities secondary to acute blood loss anemia, will attempt to keep hemoglobin levels above 8  Currently hemoglobin 10.9; monitor on daily labs      Acute liver failure without hepatic coma  Assessment & Plan  Patient experienced profound elevation in LFTs on evening of 7/30-likely shock liver in the setting of acute anemia  Has been transfused, LFTs this morning much improved  Ammonia now within normal limits; consult to gastroenterology  Ultrasound right upper quadrant noted, some mild CBD dilation, no other abnormalities noted    Elevated troponin  Assessment & Plan  Patient noted to have elevated troponin this afternoon as part of encephalopathy workup; currently 4677  Tachycardic overnight and anemic, likely elevated in demand ischemia  Have reversed anemia with blood transfusion hemoglobin currently 10.5  Monitor on telemetry; EKG with no acute findings  Have discussed with cardiology, reverse likely contributing factors and reassess  Unfortunately, echocardiogram obtained and workup of elevated troponin shows worsening EF and global hypokinesis      Encephalopathy  Assessment & Plan  Patient noted to be acutely confused on morning of 7/30  Likely multifactorial attributable to late afternoon early evening  "anesthesia and lack of sleep overnight  Workup revealed several abnormalities as noted above, most have been reversed with transfusion of PRBCs  Mentation much improved; appreciate geriatric recommendations      Chronic HFrEF (heart failure with reduced ejection fraction) (Formerly [facility])  Assessment & Plan  Wt Readings from Last 3 Encounters:   08/01/24 61.9 kg (136 lb 8 oz)   07/19/24 67.2 kg (148 lb 2.4 oz)   07/15/24 65.3 kg (144 lb)     Followed by cardiology, continue diuretics  AV node blockers on hold secondary to bradycardia  Remains 96% SpO2 on room air, not in acute exacerbation        Paroxysmal atrial fibrillation (HCC)  Assessment & Plan  Converted spontaneously before she was placed on telemetry. ECG reviewed with cardiology.  Per cardiology: \"Given her anemia, DAPT for recent surgery, recent surgery and low A-fib burden, will hold off starting anticoagulation for now.\"     7/29-update: Patient now with tachybradycardia syndrome; cardiology recommending transfer to [place] sometime following Friday's vascular surgery procedure for possible implantation of pacemaker.    Coronary artery disease of native artery of native heart with stable angina pectoris (Formerly [facility])  Assessment & Plan  CAD with history of PCI.  No chest pain.  On DAPT  Seen in consultation with cardiology for preoperative risk assessment    Cellulitis of toe of right foot  Assessment & Plan  Patient presenting with right big toe gangrene and associated cellulitis  Patient remains afebrile, nontoxic; however white count trended up substantially overnight, source control needed  Continue IV Rocephin through 8/5 as per ID  Patient underwent right hallux amputation on 7/29, followed by podiatry    Primary hypertension  Assessment & Plan  Controlled  Continue amlodipine, clonidine, metoprolol    Type 2 diabetes mellitus, without long-term current use of insulin (Formerly [facility])  Assessment & Plan  Lab Results   Component Value Date    HGBA1C 5.9 (H) 07/09/2024 "     Recent Labs     08/01/24  1137 08/01/24  1601 08/01/24  2102 08/02/24  0755   POCGLU 272* 227* 242* 188*       Home regimen: Tradjenta  Inpatient regimen: Sliding scale    7/31-update: Patient's glucose levels not at goal, may represent physiological reaction to ongoing treatment plan.  Have added 10 units long-acting insulin daily, monitor and uptitrate/add prandial insulin as needed    8/1-update: Blood glucose level still above goal, will initiate 3 units prandial insulin 3 times daily with meals      Depression, recurrent (HCC)  Assessment & Plan  Continue Lexapro    Acute renal failure (ARF) (Formerly [facility])  Assessment & Plan  Baseline 1.4-1.7  Initial RICARDO likely due to sepsis / pre-renal.  Had resolved, but unfortunately patient with acute blood loss anemia 7/30 and creatinine spiked  Improving to 1.88 on morning labs following transfusion of 2 units PRBCs  Resolved, continue to monitor      Recent Labs     07/31/24  0502 08/01/24  0440 08/02/24  0520   BUN 77* 77* 69*   CREATININE 2.37* 2.19* 1.88*   EGFR 19 21 25                 CVA (cerebral vascular accident) (HCC)  Assessment & Plan  History of CVA. MRI 7/2023 showing: Large area of acute to subacute ischemia left PCA distribution occipital lobe. Small foci of acute to subacute ischemia in the left frontal and left parietal/temporal lobes MCA distribution.  Continue Aspirin; statin temporarily on hold for acute hepatic failure  Restart once LFTs within normal limits    Basilar artery stenosis  Assessment & Plan  history of carotid surgery.    Follows with Dr. Gallo               VTE Pharmacologic Prophylaxis: VTE Score: 3 Moderate Risk (Score 3-4) - Pharmacological DVT Prophylaxis Ordered: heparin.    Mobility:   Basic Mobility Inpatient Raw Score: 8  JH-HLM Goal: 3: Sit at edge of bed  JH-HLM Achieved: 4: Move to chair/commode  JH-HLM Goal achieved. Continue to encourage appropriate mobility.    Patient Centered Rounds: I performed bedside rounds with nursing  "staff today.   Discussions with Specialists or Other Care Team Provider:     Education and Discussions with Family / Patient: Discussed treatment plan with family and patient who agree with current plan; encouraged to ask questions and participate.      Total Time Spent on Date of Encounter in care of patient: 45 mins. This time was spent on one or more of the following: performing physical exam; counseling and coordination of care; obtaining or reviewing history; documenting in the medical record; reviewing/ordering tests, medications or procedures; communicating with other healthcare professionals and discussing with patient's family/caregivers.    Current Length of Stay: 14 day(s)  Current Patient Status: Inpatient   Certification Statement: The patient will continue to require additional inpatient hospital stay due to awaiting surgical procedure  Discharge Plan: Anticipate discharge in >72 hrs to discharge location to be determined pending rehab evaluations.    Code Status: Level 3 - DNAR and DNI    Subjective:   Patient seen and examined bedside, overall mentation is much improved as are labs and vitals.  Patient thriving with improved hemoglobin.  LFTs coming down, RICARDO resolved.  Patient seemed to indicate yesterday that she was not willing to go forward with surgery. however today, when asked if she was still wanting to do the surgery, she said \"that's what I'm here for, isn't it?\".  Very anxious to have procedure done and get on with her life; also accepting of pacemaker following upcoming revascularization procedure.    Objective:     Vitals:   Temp (24hrs), Av.3 °F (36.3 °C), Min:97.3 °F (36.3 °C), Max:97.4 °F (36.3 °C)    Temp:  [97.3 °F (36.3 °C)-97.4 °F (36.3 °C)] 97.4 °F (36.3 °C)  HR:  [84-93] 93  Resp:  [16-18] 18  BP: (141-148)/(76-81) 141/76  SpO2:  [93 %-96 %] 96 %  Body mass index is 28.53 kg/m².     Input and Output Summary (last 24 hours):     Intake/Output Summary (Last 24 hours) at " 8/2/2024 1513  Last data filed at 8/2/2024 0900  Gross per 24 hour   Intake 1440 ml   Output 300 ml   Net 1140 ml       Physical Exam:   Physical Exam  Vitals and nursing note reviewed.   Constitutional:       General: She is not in acute distress.     Appearance: She is well-developed.   HENT:      Head: Normocephalic and atraumatic.   Eyes:      Conjunctiva/sclera: Conjunctivae normal.   Cardiovascular:      Rate and Rhythm: Normal rate. Rhythm irregular.   Pulmonary:      Effort: Pulmonary effort is normal. No respiratory distress.   Abdominal:      Palpations: Abdomen is soft.      Tenderness: There is no abdominal tenderness.   Musculoskeletal:         General: Swelling, deformity and signs of injury present.      Cervical back: Neck supple.      Right lower leg: Edema present.   Skin:     General: Skin is warm and dry.      Comments: Bilateral groin incisions   Neurological:      Mental Status: She is alert.   Psychiatric:         Mood and Affect: Mood normal.            Additional Data:     Labs:  Results from last 7 days   Lab Units 08/02/24  0520 07/31/24  1715 07/31/24  0502   WBC Thousand/uL 25.63*   < > 31.56*   HEMOGLOBIN g/dL 10.9*   < > 7.4*   HEMATOCRIT % 34.0*   < > 22.5*   PLATELETS Thousands/uL 272   < > 318   BANDS PCT %  --   --  4   SEGS PCT % 88*   < >  --    LYMPHO PCT % 2*   < > 3*   MONO PCT % 5   < > 5   EOS PCT % 0   < > 0    < > = values in this interval not displayed.     Results from last 7 days   Lab Units 08/02/24  0520   SODIUM mmol/L 141   POTASSIUM mmol/L 3.5   CHLORIDE mmol/L 104   CO2 mmol/L 25   BUN mg/dL 69*   CREATININE mg/dL 1.88*   ANION GAP mmol/L 12   CALCIUM mg/dL 8.4   ALBUMIN g/dL 3.4*   TOTAL BILIRUBIN mg/dL 1.19*   ALK PHOS U/L 143*   ALT U/L 246*   AST U/L 362*   GLUCOSE RANDOM mg/dL 193*     Results from last 7 days   Lab Units 07/31/24  1111   INR  1.70*     Results from last 7 days   Lab Units 08/02/24  1141 08/02/24  0755 08/01/24  2102 08/01/24  1601  08/01/24  1137 08/01/24  0741 07/31/24  2141 07/31/24  1634 07/31/24  1117 07/31/24  0755 07/30/24  2102 07/30/24  1616   POC GLUCOSE mg/dl 219* 188* 242* 227* 272* 233* 253* 247* 260* 285* 253* 195*         Results from last 7 days   Lab Units 07/31/24  0502 07/31/24  0121 07/30/24  2202   LACTIC ACID mmol/L 1.9 2.4* 4.6*       Lines/Drains:  Invasive Devices       Peripheral Intravenous Line  Duration             Peripheral IV 07/31/24 Right Antecubital 2 days                          Imaging: No pertinent imaging reviewed.    Recent Cultures (last 7 days):   Results from last 7 days   Lab Units 07/29/24  1805   GRAM STAIN RESULT  Rare Polys*  Rare Gram negative rods*       Last 24 Hours Medication List:   Current Facility-Administered Medications   Medication Dose Route Frequency Provider Last Rate    allopurinol  100 mg Oral Daily Costa Omer DPM      aspirin  81 mg Oral Daily Costa Omer DPM      cefTRIAXone  1,000 mg Intravenous Q24H Amaya Aldea, DO 1,000 mg (08/01/24 2348)    escitalopram  20 mg Oral Daily Costa Omer DPM      HYDROmorphone  0.2 mg Intravenous Q6H PRN Costa Omer DPM      insulin detemir  10 Units Subcutaneous Daily Cullen Reinoso MD      insulin lispro  1-5 Units Subcutaneous TID AC Costa Omer DPM      insulin lispro  3 Units Subcutaneous TID With Meals Cullen Reinoso MD      lactulose  20 g Oral TID Soraida Tobias PA-C      lidocaine  1 patch Topical Daily Costa Omer DPM      metoprolol succinate  25 mg Oral Daily Timothy Rey DO      ondansetron  4 mg Intravenous Q6H PRN Costa Omer DPM      oxyCODONE  5 mg Oral Q6H PRN Costa Omer DPM      sodium bicarbonate  650 mg Oral BID after meals Costa Omer DPM      ticagrelor  90 mg Oral Q12H JAVIER Costa Omer DPM      torsemide  10 mg Oral Daily Costa Omer DPM          Today, Patient Was Seen By: Cullen Reinoso MD    **Please  Note: This note may have been constructed using a voice recognition system.**

## 2024-08-02 NOTE — PROGRESS NOTES
Progress Note - Infectious Disease   Anamaria Parnell 79 y.o. female MRN: [id number]  Unit/Bed#: [place] [address] -01 Encounter: [id number]    Impression/Plan:  1. Leukocytosis. Likely reactive in setting of critical limb ischemia. Also consider role of R foot infection. Now with operative culture showing morganella. No other clear source appreciated. Single set of blood cultures was negative. She's remained afebrile. This morning her WBC count continues trending down.  -antibiotics as below  -monitor CBCD  -monitor vitals     2. R hallux dry gangrene. In setting of severe PAD with critical limb ischemia and DMII. Patient underwent a course of IV cefazolin prior to surgical intervention. Podiatry took patient to the OR on 7/29/2024 for R hallux amputation and partial 1st ray resection. Wound is packed open and will likely require definitive closure once patient is medically stable. Tissue culture with growth of morganella. The patient has transitioned to IV ceftriaxone. I recommend continuing this antibiotic for now.  -continue IV ceftriaxone  -check CBCD and BMP tomorrow  -monitor vitals  -serial R foot exams  -surgical site care per podiatry  -continue follow up with podiatry      3. PAD. LEAD from 7/16/2024 with severe B/L LE disease through the femoral-popliteal arteries with high grade stenosis vs occlusion. Vascular surgery has been following closely. Patient is s/p bilateral femoral endarterectomy with left to right femorofemoral PTFE bypass and retrograde left JACI, left EIA stenting, and bilateral groin VAC placement on 7/24/2024. She will need definitive R sided fem-pop bypass which is tentatively planned for 8/6/2024.  -serial B/L LE exams  -anticipate Fem-Pop bypass next week  -VAC care per vascular surgery  -continue close follow up with vascular surgery      4. Type 2 diabetes mellitus without long term insulin use. Patient's last HbA1c was 5.9% on 7/9/2024. Elevated blood glucose is risk factor for  infection.   -recommend tight glycemic control  -blood glucose management per primary service     5. RICARDO. On CKD stage 3. This impacts antibiotic dosing. Upon review of patient's available medical records it appears her baseline creatinine is approximately 1.3-1.8. Creatinine upon admission was 2.19. Likely prerenal in setting of acute foot infection. Consider role of renal artery stenosis. Nephrology is following. Creatinine did initially improve but became elevated again. This morning her creatinine has reduced to 1.88.  -monitor creatinine  -dose adjust antibiotics for renal function as needed  -avoid nephrotoxins  -volume management per primary service/nephrology  -continue follow up with nephrology     6. Possible shock liver. Patient with development of elevated LFTs, ammonia, troponin, and lactic acid, along with episodes of hypotension earlier this week. She has been given albumin and PRBC. Cardiology is following. Gastroenterology is following. RUQ ultrasound with dopplers showed a dilated CBD. Portal venous flow was hepatopetal.  -monitor CBCD and CMP  -transfusion support per primary service  -dose adjust antibiotics for hepatic function as needed  -serial abdominal exams  -monitor GI symptoms  -continue follow up with cardiology  -continue follow up with gastroenterology    Above plan was discussed in detail with patient at the bedside.  Above plan was discussed in detail with NICOLLE who agrees with antibiotic plan.    Antibiotics:  Ceftriaxone 2  Antibiotics 5    Subjective:  Patient reports she's really eager to leave the hospital. Reports feeling lonely and bored. Is also tired of needles, reports both of her arms are sensitive. She has no fever, chills, sweats, shakes; no nausea, vomiting, abdominal pain; no cough, shortness of breath, or chest pain. Patient with no concerns regarding her foot today, denied pain. Also no concerns with her groin VAC dressings.No new  symptoms.    Objective:  Vitals:  Temp:  [97.3 °F (36.3 °C)-97.8 °F (36.6 °C)] 97.3 °F (36.3 °C)  HR:  [84-95] 84  Resp:  [16-18] 16  BP: (126-148)/(71-81) 146/79  SpO2:  [93 %-96 %] 93 %  Temp (24hrs), Av.5 °F (36.4 °C), Min:97.3 °F (36.3 °C), Max:97.8 °F (36.6 °C)  Current: Temperature: (!) 97.3 °F (36.3 °C)    Physical Exam:   General Appearance:  Alert, interactive, nontoxic, no acute distress. She appears comfortable sitting up in bed. She appears chronically debilitated.    Throat: Oropharynx moist without lesions.    Lungs:   Clear to auscultation bilaterally; no wheezes, rhonchi or rales; respirations unlabored on room air.   Heart:  Irregular; no murmur, rub or gallop.   Abdomen:   Soft, non-tender, non-distended, positive bowel sounds.  B/L groins with intact wound VAC dressing, no leakage, no spreading erythema.   Extremities: R foot dressing remains intact.   Skin: No new rashes noted on exposed skin.     Labs, Imaging, & Other studies:   All pertinent labs and imaging studies were personally reviewed  Results from last 7 days   Lab Units 24  0520 24  0440 24  1715 24  0502   WBC Thousand/uL 25.63* 33.34*  --  31.56*   HEMOGLOBIN g/dL 10.9* 10.5* 10.5* 7.4*   PLATELETS Thousands/uL 272 303  --  318     Results from last 7 days   Lab Units 24  0440 24  0502 24  1808   POTASSIUM mmol/L 4.1 4.7 5.2   CHLORIDE mmol/L 107 108 109*   CO2 mmol/L  24 19*   BUN mg/dL 77* 77* 70*   CREATININE mg/dL 2.19* 2.37* 2.17*   EGFR ml/min/1.73sq m 21 19 21   CALCIUM mg/dL 8.6 8.2* 8.2*   AST U/L 814* 2,280* 3,936*   ALT U/L 330* 516* 724*   ALK PHOS U/L 139* 105* 111*     Results from last 7 days   Lab Units 24  1805   GRAM STAIN RESULT  Rare Polys*  Rare Gram negative rods*

## 2024-08-02 NOTE — PROGRESS NOTES
"Podiatry - Progress Note  Patient: Anamaria Parnell 79 y.o. female   MRN: [id number]  PCP: Ritchie Lawton MD  Unit/Bed#: [address]-01 Encounter: [id number]  Date Of Visit: 24    ASSESSMENT:    Anamaria Parnell is a 79 y.o. female with:    Right hallux gangrene, Davidson grade 4  - s/p right partial first ray amputation (DOS: 24)  T2DM (HbA1c - 5.9%)  CKD Stage 4  Aortoiliac occlusive disease  PAD      PLAN:    POD 4 s/p Right partial first ray amputation, packed open. Surgical site stable today, packing changed.  Will continue to monitor surgical site, patient will require difinitive amputation once medically stable and cleared from vascular surgery post-intervention.   Continue ABX per ID recommendations, intra-operative cultures positive for 3+ growth of Morganella morganii.   Continue local wound care, podiatry to do dressing changes  Elevation and offloading on green foam wedges or pillows when non-ambulatory.  Rest of care per primary team.     Weightbearing status: Non-weightbearing left  foot    SUBJECTIVE:     The patient was seen, evaluated, and assessed at bedside today. The patient was awake, alert, and in no acute distress. No acute events overnight. The patient reports feeling well today, denies significant lower extremity discomfort. Patient denies N/V/F/chills/SOB/CP.      OBJECTIVE:     Vitals:   /76   Pulse 93   Temp (!) 97.4 °F (36.3 °C)   Resp 18   Ht 4' 10\" (1.473 m)   Wt 61.9 kg (136 lb 8 oz)   LMP  (LMP Unknown)   SpO2 96%   BMI 28.53 kg/m²     Temp (24hrs), Av.4 °F (36.3 °C), Min:97.3 °F (36.3 °C), Max:97.7 °F (36.5 °C)      Physical Exam:     Lungs: Non labored breathing  Abdomen: Soft, non-tender.  Lower Extremity:  Cardiovascular status at baseline from admission.  Neurological status at baseline from admission.  Musculoskeletal status at baseline from admission. No calf tenderness noted.     Lower extremity wound(s) as noted below:  Wound #: 1  Location: " "right partial first ray amputation  Length 5cm: Width 3cm: Depth 2cm:   Deepest Tissue Noted in Base: bone  Probe to Bone: Yes  Peripheral Skin Description: Attached  Granulation: 40% Fibrotic Tissue: 60% Necrotic Tissue: 0%   Drainage Amount: minimal, serosanguinous  Signs of Infection: No    Clinical Images 08/02/24:            Additional Data:     Labs:    Results from last 7 days   Lab Units 08/02/24  0520   WBC Thousand/uL 25.63*   HEMOGLOBIN g/dL 10.9*   HEMATOCRIT % 34.0*   PLATELETS Thousands/uL 272   SEGS PCT % 88*   LYMPHO PCT % 2*   MONO PCT % 5   EOS PCT % 0     Results from last 7 days   Lab Units 08/02/24  0520   POTASSIUM mmol/L 3.5   CHLORIDE mmol/L 104   CO2 mmol/L 25   BUN mg/dL 69*   CREATININE mg/dL 1.88*   CALCIUM mg/dL 8.4   ALK PHOS U/L 143*   ALT U/L 246*   AST U/L 362*     Results from last 7 days   Lab Units 07/31/24  1111   INR  1.70*       * I Have Reviewed All Lab Data Listed Above.    Recent Cultures (last 7 days):     Results from last 7 days   Lab Units 07/29/24  1805   GRAM STAIN RESULT  Rare Polys*  Rare Gram negative rods*     Results from last 7 days   Lab Units 07/29/24  1805   ANAEROBIC CULTURE  No anaerobes isolated       Imaging: I have personally reviewed pertinent films in PACS  EKG, Pathology, and Other Studies: I have personally reviewed pertinent reports.    ** Please Note: Portions of the record may have been created with voice recognition software. Occasional wrong word or \"sound a like\" substitutions may have occurred due to the inherent limitations of voice recognition software. Read the chart carefully and recognize, using context, where substitutions have occurred. **      "

## 2024-08-02 NOTE — PROGRESS NOTES
Progress Note - Cardiology   Anamaria Parnell 79 y.o. female MRN: [id number]  Unit/Bed#: [place] [address] -01 Encounter: [id number]      Assessment/Recommendations/Discussion:   Right great toe gangrene/osteomyelitis  Severe peripheral artery disease status post revascularization  Paroxysmal atrial fibrillation with suspected tachybradycardia syndrome with a baseline left bundle branch block and first-degree AV block with occasional high degree AV block while on beta-blocker, now resolved  Beta-blocker toxicity now resolved  Postsurgical anemia  Underlying coronary artery disease with reduced and regional wall motion abnormalities  History of stroke on dual to platelet therapy  Hypertension  Type 2 diabetes  Stage III-IV chronic kidney disease  Aortoiliac occlusive disease  Renal artery stenosis  Mesenteric artery stenosis  Ambulatory dysfunction    Echo 7/21  Left Ventricle: Left ventricular cavity size is normal. Wall thickness is severely increased. The left ventricular ejection fraction is 41% by biplane measurement. Systolic function is mildly reduced. Global longitudinal strain is reduced at -10% with moderate to severe strain reduction in the mid to basal anteroseptal septal and inferior walls.. Unable to grade diastolic dysfunction given the severe degree of mitral annular calcification.    The following segments are hypokinetic: basal inferoseptal, mid inferoseptal, apical septal, apical inferior, apical lateral and apex.     Echo 7/30  Left Ventricle: Wall thickness is moderately increased. The left ventricular ejection fraction is 30% by visual estimation. Systolic function is moderately to severely reduced. There is moderate global hypokinesis with regional variation. In particular, the entire inferoseptal wall and mid to apical inferior wall is severely hypokinetic.     PLAN  No angina symptoms  Mentation improved  Planning for BK pop bypass on 8/6 - > will be high risk, but will need to be done.   Will  "need close monitoring for development of volume overload and correction of anemia (if this develops) to keep hgb at least >8 (was 10.9 today-good)  WBC improving, now 25,000 (was 33,000 yesterday). Renal fxn improving. Cont aspirin, brilinta, toprol xl, torsemide  Would plan for repeat limited echo after surgery next week. I am hopeful that as sepsis improves and as long as BP remains stable that her EF will improve at least to >35%.   Not candidate for ICD right now with osteomyelitis. No longer on tele but seems that bradycardia has resolved despite re-instituting low dose beta blocker for HFrEF (was having high degree AV block when on 100mg metoprolol XL earlier in admission)    Subjective:   HPI  No CP or SOB, no pain at all. No dizziness of lightheadedness      Review of Systems: As noted in HPI. Rest of ROS is negative.    Vitals:   /76   Pulse 93   Temp (!) 97.4 °F (36.3 °C)   Resp 18   Ht 4' 10\" (1.473 m)   Wt 61.9 kg (136 lb 8 oz)   LMP  (LMP Unknown)   SpO2 96%   BMI 28.53 kg/m²   I/O         07/31 0701  08/01 0700 08/01 0701  08/02 0700 08/02 0701  08/03 0700    P.O. 1440 2280     Blood 350      IV Piggyback 50      Total Intake(mL/kg) 1840 (29.7) 2280 (36.8)     Urine (mL/kg/hr) 329 (0.2) 0 (0)     Drains 25 300     Stool 0 0     Total Output 354 300     Net +1486 +1980            Unmeasured Urine Occurrence 1 x 8 x     Unmeasured Stool Occurrence 2 x 2 x           Weight (last 2 days)       Date/Time Weight    08/01/24 0559 61.9 (136.5)    07/31/24 1623 73.1 (161.16)    07/31/24 0600 73.1 (161.16)            Physical Exam   Constitutional: awake, alert and oriented, in no acute distress, no obvious deformities  Head: Normocephalic, without obvious abnormality, atraumatic  Eyes: conjunctivae clear and moist. Sclera anicteric.  No xanthelasmas. Pupils equal bilaterally.  Extraocular motions are full.  Ear nose mouth and throat: ears are symmetrical bilaterally, hearing appears to be equal " bilaterally, no nasal discharge or epistaxis, oropharynx is clear with moist mucous membranes  Neck:  Trachea is midline, neck is supple, no thyromegaly or significant lymphadenopathy, there is full range of motion.  Lungs: clear to auscultation bilaterally, no wheezes, no rales, no rhonchi, no accessory muscle use, breathing is nonlabored  Heart: Regular rhythm with a Normal heart rate, S1, S2 normal, No Murmur, no click, rub or gallop, No lower extremity edema  Abdomen: soft, non-tender; bowel sounds normal; no masses,  no organomegaly  Psychiatric:  Patient is oriented to time, place, person, mood/affect is negative for depression, anxiety, agitation, appears to have appropriate insight  Skin: RLE wound with dressing in place      TELEMETRY:   Lab Results:  Results from last 7 days   Lab Units 08/02/24  0520   WBC Thousand/uL 25.63*   HEMOGLOBIN g/dL 10.9*   HEMATOCRIT % 34.0*   PLATELETS Thousands/uL 272     Results from last 7 days   Lab Units 08/02/24  0520   POTASSIUM mmol/L 3.5   CHLORIDE mmol/L 104   CO2 mmol/L 25   BUN mg/dL 69*   CREATININE mg/dL 1.88*   CALCIUM mg/dL 8.4   ALK PHOS U/L 143*   ALT U/L 246*   AST U/L 362*     Results from last 7 days   Lab Units 08/02/24  0520   POTASSIUM mmol/L 3.5   CHLORIDE mmol/L 104   CO2 mmol/L 25   BUN mg/dL 69*   CREATININE mg/dL 1.88*   CALCIUM mg/dL 8.4           Medications:    Current Facility-Administered Medications:     allopurinol (ZYLOPRIM) tablet 100 mg, 100 mg, Oral, Daily, Costa Omer DPM, 100 mg at 08/02/24 0918    aspirin chewable tablet 81 mg, 81 mg, Oral, Daily, Costa Omer DPM, 81 mg at 08/02/24 0918    cefTRIAXone (ROCEPHIN) 1,000 mg in dextrose 5 % 50 mL IVPB, 1,000 mg, Intravenous, Q24H, Amaya Mujica DO, Last Rate: 100 mL/hr at 08/01/24 2348, 1,000 mg at 08/01/24 2348    escitalopram (LEXAPRO) tablet 20 mg, 20 mg, Oral, Daily, Costa Omer DPM, 20 mg at 08/02/24 0918    HYDROmorphone HCl (DILAUDID) injection 0.2 mg,  "0.2 mg, Intravenous, Q6H PRN, Costa Omer DPM    insulin detemir (LEVEMIR) subcutaneous injection 10 Units, 10 Units, Subcutaneous, Daily, Cullen Reinoso MD, 10 Units at 08/02/24 0920    insulin lispro (HumALOG/ADMELOG) 100 units/mL subcutaneous injection 1-5 Units, 1-5 Units, Subcutaneous, TID AC, 1 Units at 08/02/24 0800 **AND** Fingerstick Glucose (POCT), , , TID AC, Costa Omer DPM    insulin lispro (HumALOG/ADMELOG) 100 units/mL subcutaneous injection 3 Units, 3 Units, Subcutaneous, TID With Meals, Cullen Reinoso MD, 3 Units at 08/02/24 0800    lactulose (CHRONULAC) oral solution 20 g, 20 g, Oral, TID, Soraida Tobias PA-C, 20 g at 08/02/24 0921    lidocaine (LIDODERM) 5 % patch 1 patch, 1 patch, Topical, Daily, Costa Omer DPM, 1 patch at 08/01/24 0935    metoprolol succinate (TOPROL-XL) 24 hr tablet 25 mg, 25 mg, Oral, Daily, Timothy Rey DO, 25 mg at 08/02/24 0918    ondansetron (ZOFRAN) injection 4 mg, 4 mg, Intravenous, Q6H PRN, Costa Omer DPM    oxyCODONE (ROXICODONE) IR tablet 5 mg, 5 mg, Oral, Q6H PRN, Costa Oemr DPM, 5 mg at 07/29/24 1954    sodium bicarbonate tablet 650 mg, 650 mg, Oral, BID after meals, Costa Omer DPM, 650 mg at 08/02/24 0918    ticagrelor (BRILINTA) tablet 90 mg, 90 mg, Oral, Q12H JAVIER, Costa Omer DPM, 90 mg at 08/02/24 0922    torsemide (DEMADEX) tablet 10 mg, 10 mg, Oral, Daily, Costa Omer DPM, 10 mg at 08/02/24 0922    Portions of the record may have been created with voice recognition software. Occasional wrong word or \"sound a like\" substitutions may have occurred due to the inherent limitations of voice recognition software. Read the chart carefully and recognize, using context, where substitutions have occurred.    Tiomthy Rey DO, [facility], [facility]  8/2/2024 10:15 AM      "

## 2024-08-02 NOTE — QUICK NOTE
GASTROENTEROLOGY QUICK NOTE:     Patient's chart reviewed.  Lab work today reveal continued downtrend in liver enzymes.  Today AST//246, alkaline phosphatase 143, and total bilirubin 1.19.  Overall, LFTs drastically improved.  Clinical course and pattern of liver chemistries consistent with ischemic hepatopathy.  Therefore recommend ongoing conservative management with no further GI testing or intervention warranted at this time.  Suspect LFTs will continue to improve daily as they have been.  GI will sign off.  Please call with any questions or concerns.  Thank you.    Kelli Woods DO, PGY-5  [facility] Fellow

## 2024-08-02 NOTE — ASSESSMENT & PLAN NOTE
PAD and aortic disease, R CLTI and R great toe dry gangrene. S/P Bilateral femoral endarterectomy with L to R fem-fem PTFE bypass and retrograde L JACI, L EIA stenting, bilateral groin VAC placement on 7/24   Intervention planned for Tuesday, 8/6

## 2024-08-02 NOTE — PROGRESS NOTES
Progress Note - Geriatric Medicine   Anamaria Parnell 79 y.o. 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, status post bilateral femoral endarterectomy with left to right femorofemoral PTFE bypass and retrograde left JACI, left EIA stenting, and bilateral groin VAC placement, anesthesia, acute pain, acute liver failure without hepatic coma (LFTs improving), elevated troponins, RICARDO (improving), acute blood loss anemia (hemoglobin improving), sleep disturbances, vision impairment, and prolonged hospitalization   Patient was noted to have lactic acidosis the night of 7/30 which has since resolved  UA negative for UTI on admission   Leukocytosis noted on labs today but is improving (down to 25.63 from 33.34)  ID is currently consulted and following  Hemoglobin improved to 10.9 on labs today   Patient is alert and awake on my exam today   While not fully oriented her cognition is improved   She is oriented to person, place (hospital), and year   She initially notes she is in [facility] but is able to state that this is a hospital and she notes the year to be 1964 or 1975  She is pleasant, calm, and cooperative on my exam today   Identify and treat reversible causes of confusion including infection, dehydration, electrolyte imbalance, anemia, hypoxia, urinary retention, constipation, pain, and sleep disturbance  Maintain delirium precautions   Provide redirection, reorientation, and distraction techniques  Maintain fall and safety precautions   Assist with ADLs/IADLs  Avoid deliriogenic  medications such as tramadol, benzodiazepines, anticholinergics, benadryl  Treat pain using geriatric pain protocol   Encourage oral hydration and nutrition   Monitor for constipation and urinary retention   Implement sleep hygiene and limit night time interuptions   Maintain sleep-wake cycle   Encourage early and frequent mobilization   Most recent EKG on 7/30/2024 revealed a QTc interval of 525  Medication at this time is extremely limited for acute agitation and behaviors   Would avoid antipsychotics for acute agitation and behaviors secondary to prolonged QTc interval   Would also avoid Depakote due to acute liver failure   While not typically recommended in geriatric patients can consider low dose lorazepam 0.25 mg if needed for acute agitation and behaviors as other options are not feasible at this time   Redirect and reorient as needed  Keep mentally, physically, and socially active    Cognitive Screening   Per chart review, it appears that the patient has a prior history of memory loss, however, I do not see any workup for this diagnosis and it does not appear as a documented diagnosis in [facility]  Patients son notes the patient is alert and oriented x 4 at baseline and has no issues or difficulties with her memory   While not fully oriented her cognition is improved   She is oriented to person, place (hospital), and year   She initially notes she is in [facility] but is able to state that this is a hospital and she notes the year to be 1964 or 1975  She is pleasant, calm, and cooperative without behaviors   Most recent TSH on 7/27/2024 noted to be 2.309  Most recent vitamin B 12 level on 2/25/2019 noted to be 502  Consider checking on routine labs   CT of the head on 7/30/2024 revealed chronic left PCA infarct and microangiopathic changes   No MoCA or cognitive testing noted in epic  Maintain delirium precautions as discussed below  Redirect and reorient as needed  Keep physically, mentally, and socially active      Deconditioning   Patient is at increased risk for deconditioning secondary to right hallux gangrene status post right partial first ray amputation, status post bilateral femoral endarterectomy with left to right femorofemoral PTFE bypass and retrograde left JACI, left EIA stenting, and bilateral groin VAC placement, anesthesia, acute pain, acute blood loss anemia (hemoglobin improving), weakness, gait dysfunction, and prolonged hospitalization   Continue to optimize diet, hydration, and mobility for healing   Stage IV Chronic Kidney Disease   Baseline creatinine appears to be 1.2-1.8  Patient now with RICARDO though creatinine improved on labs today (down to 1.88 from 2.17 yesterday)  Patient does follow with neurology in the outpatient setting   Nephrology had been consulted but has since signed off   Avoid nephrotoxic medication and renal dose medication   Keep hydrated   PO intake  Patient notes she has a decreased appetite   She notes that she used to have a very good appetite when she was on insulin but since being transitioned to oral tradjenta her appetite has decreased   She does drink carnation breakfast drinks at home  She is receiving glucerna with meals here  Encourage oral hydration and nutrition   Congestive heart failure   Most recent echo on 7/21/2024 revealed an EF of 41%  Patient does not appear to be on any diuretics at baseline  She is currently on torsemide 10 mg daily though this was held on 7/30 and 7/31 due to low blood pressures   Cardiology is consulted and following  Recommend low-sodium diet  Continue to monitor weights and I&O  Management per cardiology  Type II Diabetes   Hemoglobin A1c on 7/9/2024 noted to be 5.9 which is pretty tightly controlled for patient's age  Patient has been on insulin in the past but once her A1c was improved she was transitioned over to Tradjenta which she and her son note have affected her appetite  Patient is currently on blood sugar checks and while blood  sugars have been elevated they are acceptable  Her current regimen is Humalog SSI with meals and Levemir 10 units daily  Continue insulin and diabetic diet  Avoid hypoglycemia  Acute Blood Loss Anemia  Baseline hemoglobin appears to be 11-13  She has been primarily trending in the 7's for the past several days  She was noted to have a hemoglobin of 6.4 on 7/30 and received a unit of blood  Her hemoglobin on 7/31 was 7.4 and she received another unit of blood  Hemoglobin on labs today stable and improving at 10.9  Continue to monitor CBC  Transfuse for hemoglobin < 7   Monitor for signs and symptoms of infection, dehydration, DVT, and skin breakdown    Frailty   Clinical Frail Scale: 5- Mildly Frail (baseline)  More evident slowing, needs help high order IADLs (transport, bills, medications)  Progressively impairs shopping and walking outside alone, meal prep and housework  Clinical Frail Scale: 6- Moderately Frail (current)  Need help with all outside activities  Need help with stairs and bathing  May need assistance with dressing  Most recent albumin on labs today noted to be 3.4  Consider nutrition consult  Encourage protein supplementation     Ambulatory Dysfunction/Falls  Patient has not had any recent falls at home  She notes she has both a roller walker and cane at home  Her son notes that she has an electric scooter that she primarily uses and then will supplement with the cane when ambulating   PT/OT consulted to assist with strengthening/mobility and assist with discharge planning to appropriate level of care  Assess patient frequently for physical needs, encourage use of assistant devices as needed and directed by PT/OT  Identify cognitive and physical deficits and behaviors that affect risk of falls  Consider moving patient closer to nursing station to monitor more closely for impulsive behavior which may increase risk of falls  [place] fall precautions   Educate patient/family on patient safety  including physical limitations and importance of using call bell for assistance   Modify environment to reduce risk of injury including disconnecting from pole when not in use, ensuring adequate lighting in room and restroom, ensuring that path to restroom is clear and free of trip hazards  Out of bed as tolerated     Impaired Vision   Patient does have vision impairment   She does wear eyeglasses  Recommend use of corrective lenses at all appropriate times  Encourage adequate lighting and encourage use of assistance with ambulation  Keep personal belongings close to avoid reaching  Encourage appropriate footwear at all times  Recommend large font for printed materials provided to patient    Dentition/Appetite   Patient does not wear dentures at baseline   She notes that her appetite is fair as discussed above   Ensure meal consistency is appropriate for all abilities   Consider nutrition consult   Continue aspiration precautions     Elimination   Patient is continent of bowel and bladder at baseline  She has been recently having incontinent episodes here   Suspect this may be related to encephalopathy   Patient has no documented history of constipation   Last documented bowel movement was this morning   She is receiving lactulose TID   Monitor for constipation and urinary retention     Insomnia   Patient notes she has some difficulty sleeping at home   She notes that she sleeps in a recliner chair at baseline   She is on trazodone 50 mg daily at bedtime at baseline   Her son noted that he works overnights and does not get home until around 0300 and she is often awake when he gets home and she sleeps on his schedule  Trazodone is currently on hold   Nursing notes that the patient is sleeping well at night and intermittently throughout the day   Would hold off on sedating medication including trazodone at this time  First line is behavioral therapy   Avoid sedative hypnotics including benzodiazepines and  benadryl  Encourage staying awake during the day   Encourage daytime activities and morning exercise   Decrease or eliminate daytime naps   Avoid caffeine especially during late afternoon and evening hours  Establish a nighttime routine  Implement sleep hygiene and limit nighttime interruptions    Anxiety/Depression  Patient has a documented history of depression   She is on escitalopram at baseline   Mood appears stable on exam today   Continue current medication regimen    Continue supportive care     Right Hallux Gangrene  Patient noted to have gangrene of the right hallux on admission to [facility]  She was transferred to [facility] for surgical vascular intervention  She was subsequently evaluated by podiatry for this and is now POD 4 from a right partial first ray amputation  Intraoperative cultures were obtained and are pending  Dressing changes to be completed by podiatry  Patient remains on IV antibiotics secondary to leukocytosis  ID is consulted and following  Patient is currently denying pain on exam today  Pain management as discussed below  PT/OT consulted and following  Management per podiatry and ID    Aortoiliac Occlusive Disease   Patient with PAD with occlusion of multiple vessels  Patient was transferred from [facility] to [facility] for surgical vascular intervention  Patient is now POD 9 from a bilateral femoral endarterectomy with left to right femorofemoral PTFE bypass and retrograde left JACI, left EIA stenting, and bilateral groin VAC placement   Wound VAC remains intact  Patient was to return to the OR today but this has been postponed to next week due to encephalopathy   Patient is currently denying pain  Pain management as discussed below  Management per vascular surgery    Acute Liver Failure without Hepatic Coma  Patient with elevated LFTs suspected to be related to acute illness and cephalosporins  There was some concern for shock on 7/30 as the  patient was anemic and had elevated troponins  Ammonia level on 7/30 noted to be elevated at 73  Patient was started on lactulose and ammonia level on labs on 7/31 noted to be improved 48  GI is now consulted and following  An ultrasound of the right upper quadrant yesterday revealed the common duct is mildly dilated at 8 mm  Recommended nonemergent MRCP for persistent LFT abnormality  LFTs have significantly improved and GI is now signing off   Continue to monitor liver function   Management per primary team     Elevated Troponins  Patient was noted to have elevated troponins on 7/30  She was noted to be tachycardic and anemic  She did receive a unit of blood on 7/30 and anemia improved  She had been on telemetry but this has since been discontinued   Cardiology is consulted and following  Management per cardiology and primary team    Acute Pain due to Surgery  Patient is POD 4 from a right partial first ray amputation and POD 9 from a bilateral femoral endarterectomy with left to right femorofemoral PTFE bypass and retrograde left JACI, left EIA stenting, and bilateral groin VAC placement   She is currently denying pain on exam today  Would recommend treating pain using the geriatric pain protocol as discussed below  Oxycodone 2.5 mg po Q 4 prn moderate pain  Oxycodone 5 mg po Q 4 prn severe pain   Dilaudid 0.2 mg IV Q 4 prn breakthrough pain  Would avoid Tylenol and gabapentin at this time secondary to acute liver failure and RICARDO  Monitor for constipation  Continue nonpharmacological methods of pain management      Subjective:   The patient is being seen and evaluated today at the bedside for geriatric follow-up.  She is noted to be lying in bed comfortably in no acute distress. She states she is feeling better today. She is oriented to person, place (hospital), and year. She initially notes she is in [facility] but is able to state that this is a hospital and she notes the year to be 1964 or 1975. She is pleasant,  "calm, and cooperative.  She is currently denying pain.  She denies chest pain or shortness of breath.  She notes that she has not been very hungry and has a decreased appetite but is eating a little bit.  She states she slept okay last night.     Care was coordinated with patients nurse Stefania.  She notes no acute issues or events overnight.        Review of Systems   Constitutional:  Positive for activity change and appetite change (decreased appetite). Negative for fatigue.   HENT:  Negative for dental problem and hearing loss.    Eyes:  Positive for visual disturbance (glasses).   Respiratory:  Negative for cough and shortness of breath.    Cardiovascular:  Negative for chest pain and leg swelling.   Gastrointestinal:  Negative for abdominal distention and abdominal pain.   Genitourinary:  Negative for difficulty urinating and dysuria.   Musculoskeletal:  Positive for gait problem. Negative for arthralgias.   Skin:  Negative for color change and pallor.   Neurological:  Positive for weakness. Negative for speech difficulty.   Psychiatric/Behavioral:  Positive for confusion (improving). Negative for agitation and sleep disturbance. The patient is not nervous/anxious.          Objective:     Vitals: Blood pressure 141/76, pulse 93, temperature (!) 97.4 °F (36.3 °C), resp. rate 18, height 4' 10\" (1.473 m), weight 61.9 kg (136 lb 8 oz), SpO2 96%.,Body mass index is 28.53 kg/m².      Intake/Output Summary (Last 24 hours) at 8/2/2024 1016  Last data filed at 8/2/2024 0520  Gross per 24 hour   Intake 1560 ml   Output 300 ml   Net 1260 ml       Current Medications: Reviewed    Physical Exam:   Physical Exam  Vitals and nursing note reviewed.   Constitutional:       General: She is not in acute distress.     Appearance: She is not ill-appearing.   HENT:      Head: Normocephalic.      Mouth/Throat:      Mouth: Mucous membranes are dry.   Eyes:      General: No scleral icterus.     Conjunctiva/sclera: Conjunctivae normal. " "  Cardiovascular:      Rate and Rhythm: Normal rate and regular rhythm.   Pulmonary:      Effort: Pulmonary effort is normal. No respiratory distress.   Abdominal:      General: Bowel sounds are normal. There is no distension.      Palpations: Abdomen is soft.      Tenderness: There is no abdominal tenderness.   Musculoskeletal:         General: No swelling or tenderness.   Skin:     General: Skin is warm and dry.      Comments: Wound VAC to groin   Right foot dressing clean, dry, and intact   Neurological:      Mental Status: She is alert. She is disoriented.      Motor: Weakness present.      Gait: Gait abnormal.      Comments: Oriented to person, place (hospital), and year   She initially notes she is in [facility] but is able to state that this is a hospital and she notes the year to be 1964 or 1975            Invasive Devices       Peripheral Intravenous Line  Duration             Peripheral IV 07/31/24 Right Antecubital 2 days                    Lab, Imaging and other studies: I have personally reviewed pertinent reports.      Please note:  Voice-recognition software may have been used in the preparation of this document.  Occasional wrong word or \"sound-alike\" substitutions may have occurred due to the inherent limitations of voice recognition software.  Interpretation should be guided by context.    "

## 2024-08-02 NOTE — ASSESSMENT & PLAN NOTE
Baseline 1.4-1.7  Initial RICARDO likely due to sepsis / pre-renal.  Had resolved, but unfortunately patient with acute blood loss anemia 7/30 and creatinine spiked  Improving to 1.88 on morning labs following transfusion of 2 units PRBCs  Resolved, continue to monitor      Recent Labs     07/31/24  0502 08/01/24  0440 08/02/24  0520   BUN 77* 77* 69*   CREATININE 2.37* 2.19* 1.88*   EGFR 19 21 25

## 2024-08-02 NOTE — PLAN OF CARE
Problem: PHYSICAL THERAPY ADULT  Goal: Performs mobility at highest level of function for planned discharge setting.  See evaluation for individualized goals.  Description: Treatment/Interventions: Functional transfer training, LE strengthening/ROM, Therapeutic exercise, Patient/family training, Equipment eval/education, Bed mobility, Cognitive reorientation, Gait training, Compensatory technique education, Continued evaluation, Spoke to nursing, OT          See flowsheet documentation for full assessment, interventions and recommendations.  Note: Prognosis: Fair  Problem List: Decreased strength, Impaired balance, Decreased mobility, Decreased cognition, Impaired judgement, Decreased safety awareness, Pain, Decreased endurance (wbs)  Assessment: Anamaria was seen for a f/u session. Despite improvements in mentation, continues to be limited by deficits of strength, endurance. Progress in therapy also limited by nwb status of LLE. Pt w non compliance to wbs during attempted standing transf. Unable to achieve full stand dt weakness and non compliance. Require frequent cuing for technique to use slide board; slight improvement in UE support during board transf this session. Would continue to benefit from therapy services to help facilitate recovery and optimize mobility for dc home.  Barriers to Discharge: Decreased caregiver support, Inaccessible home environment  Barriers to Discharge Comments: son reports pt has to be indep to return home as he cannot provide complete care for her  Rehab Resource Intensity Level, PT: II (Moderate Resource Intensity)    See flowsheet documentation for full assessment.

## 2024-08-02 NOTE — PLAN OF CARE
Problem: PAIN - ADULT  Goal: Verbalizes/displays adequate comfort level or baseline comfort level  Description: Interventions:  - Encourage patient to monitor pain and request assistance  - Assess pain using appropriate pain scale  - Administer analgesics based on type and severity of pain and evaluate response  - Implement non-pharmacological measures as appropriate and evaluate response  - Consider cultural and social influences on pain and pain management  - Notify physician/advanced practitioner if interventions unsuccessful or patient reports new pain  Outcome: Progressing     Problem: INFECTION - ADULT  Goal: Absence or prevention of progression during hospitalization  Description: INTERVENTIONS:  - Assess and monitor for signs and symptoms of infection  - Monitor lab/diagnostic results  - Monitor all insertion sites, i.e. indwelling lines, tubes, and drains  - Monitor endotracheal if appropriate and nasal secretions for changes in amount and color  - Slick appropriate cooling/warming therapies per order  - Administer medications as ordered  - Instruct and encourage patient and family to use good hand hygiene technique  - Identify and instruct in appropriate isolation precautions for identified infection/condition  Outcome: Progressing     Problem: SAFETY ADULT  Goal: Patient will remain free of falls  Description: INTERVENTIONS:  - Educate patient/family on patient safety including physical limitations  - Instruct patient to call for assistance with activity   - Consult OT/PT to assist with strengthening/mobility   - Keep Call bell within reach  - Keep bed low and locked with side rails adjusted as appropriate  - Keep care items and personal belongings within reach  - Initiate and maintain comfort rounds  - Make Fall Risk Sign visible to staff  - Offer Toileting every 2 Hours, in advance of need  - Initiate/Maintain bed alarm  - Apply yellow socks and bracelet for high fall risk patients  - Consider  moving patient to room near nurses station  Outcome: Progressing  Goal: Maintain or return to baseline ADL function  Description: INTERVENTIONS:  -  Assess patient's ability to carry out ADLs; assess patient's baseline for ADL function and identify physical deficits which impact ability to perform ADLs (bathing, care of mouth/teeth, toileting, grooming, dressing, etc.)  - Assess/evaluate cause of self-care deficits   - Assess range of motion  - Assess patient's mobility; develop plan if impaired  - Assess patient's need for assistive devices and provide as appropriate  - Encourage maximum independence but intervene and supervise when necessary  - Involve family in performance of ADLs  - Assess for home care needs following discharge   - Consider OT consult to assist with ADL evaluation and planning for discharge  - Provide patient education as appropriate  Outcome: Progressing  Goal: Maintains/Returns to pre admission functional level  Description: INTERVENTIONS:  - Perform AM-PAC 6 Click Basic Mobility/ Daily Activity assessment daily.  - Set and communicate daily mobility goal to care team and patient/family/caregiver.   - Collaborate with rehabilitation services on mobility goals if consulted  - Stand patient 3 times a day  - Ambulate patient 3 times a day  - Out of bed to chair 3 times a day   - Out of bed for meals 3 times a day  - Out of bed for toileting  - Record patient progress and toleration of activity level   Outcome: Progressing     Problem: DISCHARGE PLANNING  Goal: Discharge to home or other facility with appropriate resources  Description: INTERVENTIONS:  - Identify barriers to discharge w/patient and caregiver  - Arrange for needed discharge resources and transportation as appropriate  - Identify discharge learning needs (meds, wound care, etc.)  - Arrange for interpretive services to assist at discharge as needed  - Refer to Case Management Department for coordinating discharge planning if the  patient needs post-hospital services based on physician/advanced practitioner order or complex needs related to functional status, cognitive ability, or social support system  Outcome: Progressing     Problem: Knowledge Deficit  Goal: Patient/family/caregiver demonstrates understanding of disease process, treatment plan, medications, and discharge instructions  Description: Complete learning assessment and assess knowledge base.  Interventions:  - Provide teaching at level of understanding  - Provide teaching via preferred learning methods  Outcome: Progressing     Problem: CARDIOVASCULAR - ADULT  Goal: Maintains optimal cardiac output and hemodynamic stability  Description: INTERVENTIONS:  - Monitor I/O, vital signs and rhythm  - Monitor for S/S and trends of decreased cardiac output  - Administer and titrate ordered vasoactive medications to optimize hemodynamic stability  - Assess quality of pulses, skin color and temperature  - Assess for signs of decreased coronary artery perfusion  - Instruct patient to report change in severity of symptoms  Outcome: Progressing     Problem: METABOLIC, FLUID AND ELECTROLYTES - ADULT  Goal: Electrolytes maintained within normal limits  Description: INTERVENTIONS:  - Monitor labs and assess patient for signs and symptoms of electrolyte imbalances  - Administer electrolyte replacement as ordered  - Monitor response to electrolyte replacements, including repeat lab results as appropriate  - Instruct patient on fluid and nutrition as appropriate  Outcome: Progressing  Goal: Fluid balance maintained  Description: INTERVENTIONS:  - Monitor labs   - Monitor I/O and WT  - Instruct patient on fluid and nutrition as appropriate  - Assess for signs & symptoms of volume excess or deficit  Outcome: Progressing  Goal: Glucose maintained within target range  Description: INTERVENTIONS:  - Monitor Blood Glucose as ordered  - Assess for signs and symptoms of hyperglycemia and hypoglycemia  -  Administer ordered medications to maintain glucose within target range  - Assess nutritional intake and initiate nutrition service referral as needed  Outcome: Progressing     Problem: HEMATOLOGIC - ADULT  Goal: Maintains hematologic stability  Description: INTERVENTIONS  - Assess for signs and symptoms of bleeding or hemorrhage  - Monitor labs  - Administer supportive blood products/factors as ordered and appropriate  Outcome: Progressing     Problem: MUSCULOSKELETAL - ADULT  Goal: Maintain or return mobility to safest level of function  Description: INTERVENTIONS:  - Assess patient's ability to carry out ADLs; assess patient's baseline for ADL function and identify physical deficits which impact ability to perform ADLs (bathing, care of mouth/teeth, toileting, grooming, dressing, etc.)  - Assess/evaluate cause of self-care deficits   - Assess range of motion  - Assess patient's mobility  - Assess patient's need for assistive devices and provide as appropriate  - Encourage maximum independence but intervene and supervise when necessary  - Involve family in performance of ADLs  - Assess for home care needs following discharge   - Consider OT consult to assist with ADL evaluation and planning for discharge  - Provide patient education as appropriate  Outcome: Progressing  Goal: Maintain proper alignment of affected body part  Description: INTERVENTIONS:  - Support, maintain and protect limb and body alignment  - Provide patient/ family with appropriate education  Outcome: Progressing

## 2024-08-02 NOTE — ASSESSMENT & PLAN NOTE
Patient noted to have anemia on 7/30 labs, likely secondary to recent surgical procedures  Has received 2 units PRBCs  Given other comorbidities secondary to acute blood loss anemia, will attempt to keep hemoglobin levels above 8  Currently hemoglobin 10.9; monitor on daily labs

## 2024-08-02 NOTE — PLAN OF CARE
Problem: OCCUPATIONAL THERAPY ADULT  Goal: Performs self-care activities at highest level of function for planned discharge setting.  See evaluation for individualized goals.  Outcome: Progressing  Note: Limitation: Decreased ADL status, Decreased UE strength, Decreased Safe judgement during ADL, Decreased endurance, Decreased self-care trans, Decreased high-level ADLs  Prognosis: Fair  Assessment: Pt seen for skilled OT tx this date. Tx focused on improving strength, activity tolerance and balance, safety awareness to increase independence with self care tasks. Pt tolerated session fair. Pt was limited by pain and WBS. Pt performed LB dressing / bathing maxA , Toileting maxA bed level,  bed mobility modA, transfers maxAx2 SB.Pt demonstrated ability to safely and appropriately attend to all tasks during session. Pt required moderate verbal cuing during session to safely complete tasks.   Current OT DC recommendations for pt is level 2 resources.     Rehab Resource Intensity Level, OT: II (Moderate Resource Intensity)

## 2024-08-02 NOTE — OCCUPATIONAL THERAPY NOTE
Occupational Therapy Evaluation     Patient Name: Anamaria Parnell  Today's Date: 8/2/2024  Problem List  Principal Problem:    Aortoiliac occlusive disease (HCC)  Active Problems:    Basilar artery stenosis    CVA (cerebral vascular accident) (HCC)    Acute renal failure (ARF) (HCC)    Depression, recurrent (HCC)    Type 2 diabetes mellitus, without long-term current use of insulin (HCC)    Primary hypertension    Cellulitis of toe of right foot    Coronary artery disease of native artery of native heart with stable angina pectoris (HCC)    Paroxysmal atrial fibrillation (HCC)    Chronic HFrEF (heart failure with reduced ejection fraction) (HCC)    Encephalopathy    Elevated troponin    Acute liver failure without hepatic coma    Acute blood loss anemia    Past Medical History  Past Medical History:   Diagnosis Date    Aphasia as late effect of cerebrovascular accident (CVA) 08/16/2023    Arthritis     Chronic kidney disease     Diabetes mellitus (HCC)     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/20/2021    Endometriosis     High cholesterol     History of left common carotid artery stent placement 10/27/2023    Hypertension     Kidney disease, chronic, stage III (moderate, EGFR 30-59 ml/min) ([facility])     Lumbar disc herniation     Median arcuate ligament syndrome (HCC) 11/05/2019    Neuropathy     Obesity (BMI 30-39.9) 12/04/2017    Obesity, morbid (HCC) 01/20/2021    Peripheral vascular disease ([facility])     Pneumonia     Shoulder injury     left    Spinal stenosis     Stroke ([facility]) 2015    Memory loss     Past Surgical History  Past Surgical History:   Procedure Laterality Date    ARTERIOGRAM Bilateral 7/24/2024    Procedure: ARTERIOGRAM;  Surgeon: Jose Crum DO;  Location: [facility];  Service: Vascular    CARDIAC CATHETERIZATION      CAROTID STENT Left 9/7/2023    Procedure: L TCAR;  Surgeon: Nicole Gallo MD;  [facility];  Service: Vascular    COLONOSCOPY       FL RETROGRADE PYELOGRAM  4/6/2020    FRACTURE SURGERY Right     ankle    IR CAROTID STENT  9/7/2023    IR LOWER EXTREMITY ANGIOGRAM  7/24/2024    OVARIAN CYST SURGERY      MO CYSTO BLADDER W/URETERAL CATHETERIZATION Bilateral 4/6/2020    Procedure: CYSTOSCOPY WITH RETROGRADE PYELOGRAM;  Surgeon: Robert Mitchell MD;  [facility];  Service: Urology    MO CYSTO W/INSERT URETERAL STENT Right 4/6/2020    Procedure: INSERTION STENT URETERAL;  Surgeon: Robert Mitchell MD;  [facility];  Service: Urology    MO CYSTO W/REMOVAL OF TUMORS SMALL N/A 4/6/2020    Procedure: TRANSURETHRAL RESECTION OF BLADDER TUMOR (TURBT);  Surgeon: Robert Mitchell MD;  [facility];  Service: Urology    MO TEAEC W/WO PATCH GRAFT COMMON FEMORAL Bilateral 7/24/2024    Procedure: Bilateral femoral endarterectomies with patch angioplasty; Retrograde iliac intervention, shockwave, stent placement and angioplasty; femoral to femoral bypass with PTFE;  Surgeon: Jose Curm DO;  [facility];  Service: Vascular    ROTATOR CUFF REPAIR Left     TOE AMPUTATION Right 7/29/2024    Procedure: Rigth Hallux amputation, partial 1st ray resection;  Surgeon: Eddie Farooq DPM;  [facility];  Service: Podiatry    TONSILLECTOMY           08/02/24 1140   OT Last Visit   OT Visit Date 08/02/24   Note Type   Note Type Treatment   Pain Assessment   Pain Assessment Tool FLACC   Pain Rating: FLACC (Rest) - Face 0   Pain Rating: FLACC (Rest) - Legs 0   Pain Rating: FLACC (Rest) - Activity 0   Pain Rating: FLACC (Rest) - Cry 0   Pain Rating: FLACC (Rest) - Consolability 0   Score: FLACC (Rest) 0   Pain Rating: FLACC (Activity) - Face 1   Pain Rating: FLACC (Activity) - Legs 0   Pain Rating: FLACC (Activity) - Activity 0   Pain Rating: FLACC (Activity) - Cry 0   Pain Rating: FLACC (Activity) - Consolability 0   Score: FLACC (Activity) 1   Restrictions/Precautions   Weight Bearing Precautions Per Order Yes   RLE Weight  Bearing Per Order NWB   Other Precautions Cognitive;Chair Alarm;Bed Alarm;WBS;Multiple lines;Fall Risk;Pain   ADL   LB Dressing Assistance 2  Maximal Assistance   LB Dressing Deficit Don/doff L sock   Bed Mobility   Rolling R 3  Moderate assistance   Additional items Assist x 1;Increased time required;Bedrails   Rolling L 3  Moderate assistance   Additional items Assist x 1;Bedrails;Increased time required   Supine to Sit 3  Moderate assistance   Additional items Assist x 1;HOB elevated;Bedrails;Increased time required   Transfers   Sit to Stand 2  Maximal assistance   Additional items Assist x 2   Stand to Sit 2  Maximal assistance   Additional items Assist x 2   Sliding Board transfer 2  Maximal assistance   Additional items Assist x 2;Increased time required;Verbal cues   Additional Comments attempted sit<>stand but unable to clear bed or remain compliant with WBS   Cognition   Overall Cognitive Status Impaired   Arousal/Participation Cooperative   Attention Attends with cues to redirect   Orientation Level Oriented to person;Oriented to place;Disoriented to time;Disoriented to situation   Memory Decreased recall of precautions;Decreased recall of recent events;Decreased short term memory   Following Commands Follows one step commands with increased time or repetition   Activity Tolerance   Activity Tolerance Patient tolerated treatment well   Medical Staff Made Aware PT Alicia   Assessment   Assessment Pt seen for skilled OT tx this date. Tx focused on improving strength, activity tolerance and balance, safety awareness to increase independence with self care tasks. Pt tolerated session fair. Pt was limited by pain and WBS. Pt performed LB dressing / bathing maxA , Toileting maxA bed level,  bed mobility modA, transfers maxAx2 SB.Pt demonstrated ability to safely and appropriately attend to all tasks during session. Pt required moderate verbal cuing during session to safely complete tasks.   Current OT DC  recommendations for pt is level 2 resources.   Plan   Treatment Interventions ADL retraining;Functional transfer training;UE strengthening/ROM;Endurance training;Patient/family training;Equipment evaluation/education;Compensatory technique education;Continued evaluation;Energy conservation   Goal Expiration Date 08/14/24   OT Treatment Day 5   OT Frequency 3-5x/wk   Discharge Recommendation   Rehab Resource Intensity Level, OT II (Moderate Resource Intensity)   Additional Comments  The patient's raw score on the AM-PAC Daily Activity Inpatient Short Form is 13. A raw score of less than 19 suggests the patient may benefit from discharge to post-acute rehabilitation services. Please refer to the recommendation of the Occupational Therapist for safe discharge planning.   AM-PAC Daily Activity Inpatient   Lower Body Dressing 1   Bathing 2   Toileting 1   Upper Body Dressing 3   Grooming 3   Eating 3   Daily Activity Raw Score 13   Daily Activity Standardized Score (Calc for Raw Score >=11) 32.03   AM-PAC Applied Cognition Inpatient   Following a Speech/Presentation 3   Understanding Ordinary Conversation 3   Taking Medications 1   Remembering Where Things Are Placed or Put Away 2   Remembering List of 4-5 Errands 2   Taking Care of Complicated Tasks 2   Applied Cognition Raw Score 13   Applied Cognition Standardized Score 30.46   Anna Wood, OT

## 2024-08-02 NOTE — PROGRESS NOTES
Progress Note - Vascular Surgery   Anamaria Parnell 79 y.o. female MRN: [id number]  Unit/Bed#: [place] [address] -01 Encounter: [id number]    Assessment:  79-year-old female with PAD, right lower extremity CLTI.  Status post:  - 7/24 Bilateral femoral endarterectomy, left to right femorofemoral bypass, retrograde left iliac stenting, bilateral groin VAC placement.  - 7/29 right hallux amputation    Course complicated by altered mental status, persistent leukocytosis, RICARDO, acute blood loss anemia, elevated LFTs, elevated troponin, decreased EF.    Plan:  - Tentative 8/6 right femoral-BK pop bypass  - Diabetic diet as tolerated  - Continue wound VAC changes MWF  - Follow-up ID, cardiology, geriatrics, GI, nephrology recommendations  - IV ceftriaxone  - ASA/brilinta; Statin currently held   - Pain and nausea control PRN  - Encourage IS, ambulation   - PT/OT eval and treat  - DVT ppx   - Remainder of care per primary    Subjective:  Patient with frequent incontinence of urine and stool.  Denies pain, nausea, fever, chills, shortness of breath, chest pain.  Reports tolerating little p.o. intake.  Feels overall better this morning.    Objective:    Vitals:   Afebrile, Normal VS on room air.  Temp:  [97.3 °F (36.3 °C)-97.8 °F (36.6 °C)] 97.3 °F (36.3 °C)  HR:  [84-95] 84  Resp:  [16-18] 16  BP: (126-148)/(71-81) 146/79  Body mass index is 28.53 kg/m².    Physical Exam:   Gen: NAD, Resting in bed  Neuro: A&O; motor and sensory grossly intact bilateral upper and lower extremity  Head: Normal Cephalic, Atraumatic  Eye: EOMI, No scleral icterus  CV: Regular rate  Pulm: Normal work of breathing, No respiratory distress on RA  Abd: Soft, Non-Distended, Non-Tender  Wound VAC in place with serous output, good seal.  Ext: Edema bilateral lower extremities, right foot with dressing in place, no strikethrough  Skin: Warm, Dry, Intact    I/O:  Urine output x 5  VAC output unrecorded, serous in canister    Intake/Output Summary (Last 24  hours) at 8/2/2024 0626  Last data filed at 8/2/2024 0019  Gross per 24 hour   Intake 1800 ml   Output --   Net 1800 ml       Lab Results:  08/02/24 labs pending  Recent Labs     07/31/24  0502 07/31/24  1715 08/01/24  0440 08/02/24  0520   WBC 31.56*  --  33.34* 25.63*   HGB 7.4*   < > 10.5* 10.9*     --  303 272   SODIUM 144  --  143  --    K 4.7  --  4.1  --      --  107  --    CO2 24  --  26  --    BUN 77*  --  77*  --    CREATININE 2.37*  --  2.19*  --    GLUC 283*  --  231*  --    CALCIUM 8.2*  --  8.6  --    MG 2.4  --  2.3  --    PHOS 5.0*  --  4.3*  --    AST 2,280*  --  814*  --    *  --  330*  --    ALKPHOS 105*  --  139*  --    TBILI 0.89  --  1.32*  --     < > = values in this interval not displayed.       ---    Marietta Roberts MD  General Surgery PGY-II

## 2024-08-02 NOTE — PHYSICAL THERAPY NOTE
PHYSICAL THERAPY NOTE          Patient Name: Anamaria Parnell  Today's Date: 8/2/2024 08/02/24 1203   PT Last Visit   PT Visit Date 08/02/24   Note Type   Note Type Treatment   Pain Assessment   Pain Assessment Tool FLACC  (denies)   Pain Score No Pain   Pain Location/Orientation Location: Buttocks   Hospital Pain Intervention(s) Repositioned;Ambulation/increased activity   Pain Rating: FLACC (Rest) - Face 0   Pain Rating: FLACC (Rest) - Legs 0   Pain Rating: FLACC (Rest) - Activity 0   Pain Rating: FLACC (Rest) - Cry 1   Pain Rating: FLACC (Rest) - Consolability 0   Score: FLACC (Rest) 1   Pain Rating: FLACC (Activity) - Face 0   Pain Rating: FLACC (Activity) - Legs 0   Pain Rating: FLACC (Activity) - Activity 0   Pain Rating: FLACC (Activity) - Cry 1   Pain Rating: FLACC (Activity) - Consolability 0   Score: FLACC (Activity) 1   Restrictions/Precautions   Weight Bearing Precautions Per Order Yes   RLE Weight Bearing Per Order NWB   Other Precautions Cognitive;Chair Alarm;Bed Alarm;WBS;Multiple lines;Fall Risk;Pain   General   Chart Reviewed Yes   Additional Pertinent History s/p right partial first ray amputation 7/29/24   Response to Previous Treatment Patient with no complaints from previous session.   Family/Caregiver Present Yes  (son, DIL)   Cognition   Overall Cognitive Status Impaired   Arousal/Participation Cooperative   Attention Attends with cues to redirect   Orientation Level Oriented to person;Oriented to place;Oriented to time  (general to time)   Memory Decreased recall of precautions;Decreased recall of recent events;Decreased short term memory   Following Commands Follows one step commands with increased time or repetition   Comments improved mentation compared to prev sessions. may benefit from formal cognitive testing to establish baseline.   Bed Mobility   Rolling R 3  Moderate assistance   Additional items Assist x 1;Bedrails;Increased time required    Rolling L 3  Moderate assistance   Additional items Assist x 1;Bedrails;Increased time required   Supine to Sit 3  Moderate assistance   Additional items Assist x 1;HOB elevated;Bedrails;Increased time required;Verbal cues;LE management;Other  (trunk support)   Transfers   Sit to Stand 2  Maximal assistance   Additional items Assist x 2;Increased time required;Verbal cues;Other  (RW)   Stand to Sit 2  Maximal assistance   Additional items Assist x 2;Increased time required;Other  (RW)   Sliding Board transfer 2  Maximal assistance   Additional items Assist x 2;Increased time required;Verbal cues   Additional Comments attempted sit>stand from EOB. slide board bed>chair   Balance   Static Sitting Fair   Static Standing Zero   Endurance Deficit   Endurance Deficit Yes   Endurance Deficit Description reports of fatigue, feeling SOB. vitals stable on RA. rest breaks prn   Activity Tolerance   Activity Tolerance Patient tolerated treatment well;Patient limited by fatigue;Treatment limited secondary to medical complications (Comment)  (nwb)   Medical Staff Made Aware Kristen OTJacinta Hauser PCA   Assessment   Prognosis Fair   Problem List Decreased strength;Impaired balance;Decreased mobility;Decreased cognition;Impaired judgement;Decreased safety awareness;Pain;Decreased endurance  (wbs)   Assessment Anamaria was seen for a f/u session. Despite improvements in mentation, continues to be limited by deficits of strength, endurance. Progress in therapy also limited by nwb status of LLE. Pt w non compliance to wbs during attempted standing transf. Unable to achieve full stand dt weakness and non compliance. Require frequent cuing for technique to use slide board; slight improvement in UE support during board transf this session. Would continue to benefit from therapy services to help facilitate recovery and optimize mobility for dc home.   Barriers to Discharge Decreased caregiver support;Inaccessible home environment   Barriers to  Discharge Comments son reports pt has to be indep to return home as he cannot provide complete care for her   Goals   Patient Goals go home   STG Expiration Date 08/09/24   Short Term Goal #1 1) Pt will perform bed mobility with mod Ax1 demonstrating appropriate technique 100% of the time in order to improve function. 2) Perform all transfers with mod Ax1 demonstrating safe and appropriate technique 100% of the time in order to improve ability to negotiate safely in home environment. 3) Pt will increase standing tolerance to at least 1 minute in order to demonstrate readiness for ambulation activity. 4) Pt will tolerate amb at a distance of >50' w/ appropriate AD and mod Ax2 in order to demonstrate ability to complete household distances. 5) Pt will self propel WC  for a distance of >100' w/ S in order to demonstrate improvement/independence in functional mobility. 6) Pt will maintain % of the time in order to demonstrate understanding and carryover of all education. 7) Pt will increase activity tolerance to at least 30 minutes to demonstrate improved cardiovascular endurance and ability to participate in tasks as required. 8) Pt/caregiver education as appropriate and requested   PT Treatment Day 3   Plan   Treatment/Interventions Functional transfer training;LE strengthening/ROM;Endurance training;Therapeutic exercise;Cognitive reorientation;Patient/family training;Equipment eval/education;Bed mobility;Compensatory technique education;Continued evaluation;Family;OT   Progress Slow progress, decreased activity tolerance   PT Frequency 3-5x/wk   Discharge Recommendation   Rehab Resource Intensity Level, PT II (Moderate Resource Intensity)   AM-PAC Basic Mobility Inpatient   Turning in Flat Bed Without Bedrails 2   Lying on Back to Sitting on Edge of Flat Bed Without Bedrails 2   Moving Bed to Chair 1   Standing Up From Chair Using Arms 1   Walk in Room 1   Climb 3-5 Stairs With Railing 1   Basic Mobility  Inpatient Raw Score 8   Turning Head Towards Sound 3   Follow Simple Instructions 3   Low Function Basic Mobility Raw Score  14   Low Function Basic Mobility Standardized Score  22.01   [facility] Level Of Mobility   -HL Goal 3: Sit at edge of bed   -HL Achieved 4: Move to chair/commode   Education   Education Provided Mobility training   Patient Reinforcement needed   End of Consult   Patient Position at End of Consult Bedside chair;All needs within reach     Alicia Abbott, PT

## 2024-08-03 PROBLEM — K92.1 MELENA: Status: ACTIVE | Noted: 2024-08-03

## 2024-08-03 LAB
GLUCOSE SERPL-MCNC: 168 MG/DL (ref 65–140)
GLUCOSE SERPL-MCNC: 182 MG/DL (ref 65–140)
GLUCOSE SERPL-MCNC: 209 MG/DL (ref 65–140)
GLUCOSE SERPL-MCNC: 304 MG/DL (ref 65–140)
HCT VFR BLD AUTO: 31.9 % (ref 34.8–46.1)
HEMOCCULT STL QL: POSITIVE
HEMOCCULT STL QL: POSITIVE
HGB BLD-MCNC: 10 G/DL (ref 11.5–15.4)

## 2024-08-03 PROCEDURE — 82948 REAGENT STRIP/BLOOD GLUCOSE: CPT

## 2024-08-03 PROCEDURE — 82272 OCCULT BLD FECES 1-3 TESTS: CPT | Performed by: NURSE PRACTITIONER

## 2024-08-03 PROCEDURE — 85018 HEMOGLOBIN: CPT | Performed by: NURSE PRACTITIONER

## 2024-08-03 PROCEDURE — NC001 PR NO CHARGE: Performed by: SURGERY

## 2024-08-03 PROCEDURE — 85014 HEMATOCRIT: CPT | Performed by: NURSE PRACTITIONER

## 2024-08-03 PROCEDURE — 99232 SBSQ HOSP IP/OBS MODERATE 35: CPT | Performed by: INTERNAL MEDICINE

## 2024-08-03 RX ORDER — PANTOPRAZOLE SODIUM 40 MG/10ML
40 INJECTION, POWDER, LYOPHILIZED, FOR SOLUTION INTRAVENOUS 2 TIMES DAILY
Status: DISCONTINUED | OUTPATIENT
Start: 2024-08-03 | End: 2024-08-05

## 2024-08-03 RX ORDER — INSULIN LISPRO 100 [IU]/ML
5 INJECTION, SOLUTION INTRAVENOUS; SUBCUTANEOUS
Status: DISCONTINUED | OUTPATIENT
Start: 2024-08-03 | End: 2024-08-09 | Stop reason: HOSPADM

## 2024-08-03 RX ADMIN — INSULIN LISPRO 3 UNITS: 100 INJECTION, SOLUTION INTRAVENOUS; SUBCUTANEOUS at 18:15

## 2024-08-03 RX ADMIN — HEPARIN SODIUM 5000 UNITS: 5000 INJECTION INTRAVENOUS; SUBCUTANEOUS at 13:09

## 2024-08-03 RX ADMIN — DEXTROSE 1000 MG: 50 INJECTION, SOLUTION INTRAVENOUS at 23:04

## 2024-08-03 RX ADMIN — TORSEMIDE 10 MG: 10 TABLET ORAL at 09:08

## 2024-08-03 RX ADMIN — ASPIRIN 81 MG CHEWABLE TABLET 81 MG: 81 TABLET CHEWABLE at 09:08

## 2024-08-03 RX ADMIN — HEPARIN SODIUM 5000 UNITS: 5000 INJECTION INTRAVENOUS; SUBCUTANEOUS at 06:07

## 2024-08-03 RX ADMIN — TICAGRELOR 90 MG: 90 TABLET ORAL at 21:43

## 2024-08-03 RX ADMIN — PANTOPRAZOLE SODIUM 40 MG: 40 INJECTION, POWDER, FOR SOLUTION INTRAVENOUS at 23:04

## 2024-08-03 RX ADMIN — TICAGRELOR 90 MG: 90 TABLET ORAL at 09:08

## 2024-08-03 RX ADMIN — INSULIN LISPRO 5 UNITS: 100 INJECTION, SOLUTION INTRAVENOUS; SUBCUTANEOUS at 18:14

## 2024-08-03 RX ADMIN — INSULIN LISPRO 1 UNITS: 100 INJECTION, SOLUTION INTRAVENOUS; SUBCUTANEOUS at 13:09

## 2024-08-03 RX ADMIN — INSULIN LISPRO 3 UNITS: 100 INJECTION, SOLUTION INTRAVENOUS; SUBCUTANEOUS at 09:09

## 2024-08-03 RX ADMIN — OXYCODONE HYDROCHLORIDE 5 MG: 5 TABLET ORAL at 23:14

## 2024-08-03 RX ADMIN — SODIUM BICARBONATE 650 MG TABLET 650 MG: at 09:08

## 2024-08-03 RX ADMIN — SODIUM BICARBONATE 650 MG TABLET 650 MG: at 18:14

## 2024-08-03 RX ADMIN — METOPROLOL SUCCINATE 25 MG: 25 TABLET, EXTENDED RELEASE ORAL at 09:08

## 2024-08-03 RX ADMIN — HEPARIN SODIUM 5000 UNITS: 5000 INJECTION INTRAVENOUS; SUBCUTANEOUS at 21:43

## 2024-08-03 RX ADMIN — ALLOPURINOL 100 MG: 100 TABLET ORAL at 09:08

## 2024-08-03 RX ADMIN — ESCITALOPRAM OXALATE 20 MG: 20 TABLET ORAL at 09:08

## 2024-08-03 RX ADMIN — INSULIN LISPRO 1 UNITS: 100 INJECTION, SOLUTION INTRAVENOUS; SUBCUTANEOUS at 09:08

## 2024-08-03 RX ADMIN — INSULIN LISPRO 5 UNITS: 100 INJECTION, SOLUTION INTRAVENOUS; SUBCUTANEOUS at 13:09

## 2024-08-03 RX ADMIN — INSULIN DETEMIR 10 UNITS: 100 INJECTION, SOLUTION SUBCUTANEOUS at 09:08

## 2024-08-03 RX ADMIN — LIDOCAINE 5% 1 PATCH: 700 PATCH TOPICAL at 09:08

## 2024-08-03 NOTE — ASSESSMENT & PLAN NOTE
78 yo female ex-smoker w/ a hx of obesity, CKD IV, DM II w/ peripheral neuropathy, HLD, HTN, CAD, L MCA/PCA CVA S/P L TCAR 9/7/23 ([place]), FÉLIX, mesenteric artery stenosis and aortoiliac occlusive disease w/ PAD and chronic R hallux dry gangrene presented to [facility] on 7/16/24 w/ worsening R great toe pain and R hallux gangrene. Pt was transferred to [facility] on 7/19 w/ plans for R femoral endarterectomy w/ retrograde stenting and RLE bypass. Patient is now s/p B/L CFAE, L JACI/EIA balloon angio and shockwave lithotripsy w/ insertion of a L EIA drug-coated stent and L JACI VBX stent, L to R PTFE fem-fem bypass, and B/L groin vac placement on 7/24.     Seen in consult by podiatry for R hallux dry gangrene now s/p right hallux amputation 7/29/24.  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  Cardiology following with plans for eventual PPM placement after revascularization procedure. GI consulted due to encephalopathy w/ worsening LFTs and lactic acidosis attributed to shock liver vs fulminant hepatitis. Troponin obtained as part of encephalopathy workup was initially 4,677 w/ EKG unchanged from prior, per cardiology.     Pt is s/p 3U PRBC and 1U FFP intra-op and 1U PRBC on 7/30 and 7/31.      Imaging:  -7/30/24 CT head: Chronic left PCA territory infarct. No evidence of acute infarct, intracranial hemorrhage or mass.  -7/29/24 LEAD: Right: Evidence of patent endarterectomy with diffuse disease noted throughout the femoral-popliteal arteries without significant focal stenosis. LISA: 0.53/48/22. Left: Evidence of patent endarterectomy with diffuse disease noted throughout the femoral-popliteal arteries without significant focal stenosis. L LISA: 0.57/10/43  -7/29/24 Xray R foot: Increasing  Patient presents to ED from walk-in with c/o intermittent chest pain and palpitations. Patient also endorses a cough and congestion that started on Friday.    volume of air within the soft tissues of the first toe in this patient with provided history of gangrene. The previously described cortical destruction along the ventral aspect of the first distal phalanx is not well visualized on the current examination, likely attributed to differences in patient positioning.  -7/22/24 Xray R foot: Cortical destruction along the ventral aspect of the first distal phalanx worrisome for acute osteomyelitis. Large air within the overlying soft tissues.   -7/21/24 Echo: Left ventricular wall thickness is severely increased. The left ventricular ejection fraction is 41% by biplane measurement. Systolic function is mildly reduced. Global longitudinal strain is reduced at -10% with moderate to severe strain reduction in the ED mid to basal anteroseptal septal and inferior walls.. Unable to grade diastolic dysfunction given the severe degree of mitral annular calcification.   -7/18/24 CTA abd w/ runoff: Visualized descending thoracic and suprarenal abdominal aorta demonstrate marked soft plaque with multifocal regions of plaque ulceration. A point of relative stenosis within the infrarenal abdominal aorta measures up to 7 mm in diameter. Bilateral multifocal severe stenosis within the external iliac and common femoral arteries. Bilateral long segment SFA occlusion extending from the ostia to the level of the P1 popliteal artery. Three-vessel runoff on the right, the posterior tibial artery is dominant. Two-vessel runoff on the left, the peroneal artery is dominant. Segmental visualization of the posterior tibial artery. Interval increase in size of an exophytic lesion arising medially from the stomach, again consistent with gastrointestinal stromal tumor. New dilation of the pancreatic duct within the pancreatic head, no obvious pancreatic/ampulla lesion identified.  -7/16/24 LEAD: Right: Severe diffuse disease noted throughout the femoral-popliteal arteries with high grade stenosis vs  occlusion of the proximal-distal SFA with reconstitution to the popliteal artery and high grade stenosis vs occlusion of the distal YOVANY. R LISA: 0.11/-/-. Left: Severe diffuse disease noted throughout the femoral-popliteal arteries with high grade stenosis vs occlusion of the proximal-mid SFA with reconstitution in the distal SFA and high grade stenosis vs occlusion of the entire PTA. L LISA: 0.31/20/19.     Plan:   -Advanced calcific atherosclerotic AIOD/PAD w/ CTLI and R hallux dry gangrene (+) OM now s/p R hallux amputation w/ partial 1st ray resection by podiatry on 7/29  -S/P B/L CFAE, L JACI/EIA balloon angio and shockwave lithotripsy w/ insertion of a L EIA drug-coated stent and L JACI VBX stent, L to R PTFE fem-fem bypass, and B/L groin vac placement on 7/24 (POD #10)  -B/L groin wound vacs intact w/ adequate suction; continue wound vac changes qMWF  -Plan for femoral to BK pop bypass in OR; tentatively scheduled for 8/6  -ID following for ABX management, input appreciated; surgical cultures growing Morganella morganii  -Cardiology following w/ plans for eventual consideration for PPM once infection resolved and revascularization complete   -Continue to monitor Hgb and transfuse < 7.0 per primary team  -Continue ASA, brilinta and pravastatin for medical management  -GI sign off, LFTs down trending. No indication for further imaging or intervention.  -Geriatrics input appreciated  -Continue medical management per primary team  -PT/OT follownig  -Will discuss w/ Dr. Gallo

## 2024-08-03 NOTE — PLAN OF CARE
Problem: PAIN - ADULT  Goal: Verbalizes/displays adequate comfort level or baseline comfort level  Description: Interventions:  - Encourage patient to monitor pain and request assistance  - Assess pain using appropriate pain scale  - Administer analgesics based on type and severity of pain and evaluate response  - Implement non-pharmacological measures as appropriate and evaluate response  - Consider cultural and social influences on pain and pain management  - Notify physician/advanced practitioner if interventions unsuccessful or patient reports new pain  Outcome: Progressing     Problem: INFECTION - ADULT  Goal: Absence or prevention of progression during hospitalization  Description: INTERVENTIONS:  - Assess and monitor for signs and symptoms of infection  - Monitor lab/diagnostic results  - Monitor all insertion sites, i.e. indwelling lines, tubes, and drains  - Monitor endotracheal if appropriate and nasal secretions for changes in amount and color  - [place] appropriate cooling/warming therapies per order  - Administer medications as ordered  - Instruct and encourage patient and family to use good hand hygiene technique  - Identify and instruct in appropriate isolation precautions for identified infection/condition  Outcome: Progressing     Problem: SAFETY ADULT  Goal: Patient will remain free of falls  Description: INTERVENTIONS:  - Educate patient/family on patient safety including physical limitations  - Instruct patient to call for assistance with activity   - Consult OT/PT to assist with strengthening/mobility   - Keep Call bell within reach  - Keep bed low and locked with side rails adjusted as appropriate  - Keep care items and personal belongings within reach  - Initiate and maintain comfort rounds  - Make Fall Risk Sign visible to staff  - Offer Toileting every 2 Hours, in advance of need  - Initiate/Maintain bed alarm  - Apply yellow socks and bracelet for high fall risk patients  - Consider  moving patient to room near nurses station  Outcome: Progressing  Goal: Maintain or return to baseline ADL function  Description: INTERVENTIONS:  -  Assess patient's ability to carry out ADLs; assess patient's baseline for ADL function and identify physical deficits which impact ability to perform ADLs (bathing, care of mouth/teeth, toileting, grooming, dressing, etc.)  - Assess/evaluate cause of self-care deficits   - Assess range of motion  - Assess patient's mobility; develop plan if impaired  - Assess patient's need for assistive devices and provide as appropriate  - Encourage maximum independence but intervene and supervise when necessary  - Involve family in performance of ADLs  - Assess for home care needs following discharge   - Consider OT consult to assist with ADL evaluation and planning for discharge  - Provide patient education as appropriate  Outcome: Progressing  Goal: Maintains/Returns to pre admission functional level  Description: INTERVENTIONS:  - Perform AM-PAC 6 Click Basic Mobility/ Daily Activity assessment daily.  - Set and communicate daily mobility goal to care team and patient/family/caregiver.   - Collaborate with rehabilitation services on mobility goals if consulted  - Stand patient 3 times a day  - Ambulate patient 3 times a day  - Out of bed to chair 3 times a day   - Out of bed for meals 3 times a day  - Out of bed for toileting  - Record patient progress and toleration of activity level   Outcome: Progressing     Problem: DISCHARGE PLANNING  Goal: Discharge to home or other facility with appropriate resources  Description: INTERVENTIONS:  - Identify barriers to discharge w/patient and caregiver  - Arrange for needed discharge resources and transportation as appropriate  - Identify discharge learning needs (meds, wound care, etc.)  - Arrange for interpretive services to assist at discharge as needed  - Refer to Case Management Department for coordinating discharge planning if the  patient needs post-hospital services based on physician/advanced practitioner order or complex needs related to functional status, cognitive ability, or social support system  Outcome: Progressing     Problem: Knowledge Deficit  Goal: Patient/family/caregiver demonstrates understanding of disease process, treatment plan, medications, and discharge instructions  Description: Complete learning assessment and assess knowledge base.  Interventions:  - Provide teaching at level of understanding  - Provide teaching via preferred learning methods  Outcome: Progressing     Problem: CARDIOVASCULAR - ADULT  Goal: Maintains optimal cardiac output and hemodynamic stability  Description: INTERVENTIONS:  - Monitor I/O, vital signs and rhythm  - Monitor for S/S and trends of decreased cardiac output  - Administer and titrate ordered vasoactive medications to optimize hemodynamic stability  - Assess quality of pulses, skin color and temperature  - Assess for signs of decreased coronary artery perfusion  - Instruct patient to report change in severity of symptoms  Outcome: Progressing     Problem: METABOLIC, FLUID AND ELECTROLYTES - ADULT  Goal: Electrolytes maintained within normal limits  Description: INTERVENTIONS:  - Monitor labs and assess patient for signs and symptoms of electrolyte imbalances  - Administer electrolyte replacement as ordered  - Monitor response to electrolyte replacements, including repeat lab results as appropriate  - Instruct patient on fluid and nutrition as appropriate  Outcome: Progressing  Goal: Fluid balance maintained  Description: INTERVENTIONS:  - Monitor labs   - Monitor I/O and WT  - Instruct patient on fluid and nutrition as appropriate  - Assess for signs & symptoms of volume excess or deficit  Outcome: Progressing  Goal: Glucose maintained within target range  Description: INTERVENTIONS:  - Monitor Blood Glucose as ordered  - Assess for signs and symptoms of hyperglycemia and hypoglycemia  -  Administer ordered medications to maintain glucose within target range  - Assess nutritional intake and initiate nutrition service referral as needed  Outcome: Progressing     Problem: HEMATOLOGIC - ADULT  Goal: Maintains hematologic stability  Description: INTERVENTIONS  - Assess for signs and symptoms of bleeding or hemorrhage  - Monitor labs  - Administer supportive blood products/factors as ordered and appropriate  Outcome: Progressing     Problem: MUSCULOSKELETAL - ADULT  Goal: Maintain or return mobility to safest level of function  Description: INTERVENTIONS:  - Assess patient's ability to carry out ADLs; assess patient's baseline for ADL function and identify physical deficits which impact ability to perform ADLs (bathing, care of mouth/teeth, toileting, grooming, dressing, etc.)  - Assess/evaluate cause of self-care deficits   - Assess range of motion  - Assess patient's mobility  - Assess patient's need for assistive devices and provide as appropriate  - Encourage maximum independence but intervene and supervise when necessary  - Involve family in performance of ADLs  - Assess for home care needs following discharge   - Consider OT consult to assist with ADL evaluation and planning for discharge  - Provide patient education as appropriate  Outcome: Progressing  Goal: Maintain proper alignment of affected body part  Description: INTERVENTIONS:  - Support, maintain and protect limb and body alignment  - Provide patient/ family with appropriate education  Outcome: Progressing     Problem: Prexisting or High Potential for Compromised Skin Integrity  Goal: Skin integrity is maintained or improved  Description: INTERVENTIONS:  - Identify patients at risk for skin breakdown  - Assess and monitor skin integrity  - Assess and monitor nutrition and hydration status  - Monitor labs   - Assess for incontinence   - Turn and reposition patient  - Assist with mobility/ambulation  - Relieve pressure over bony  prominences  - Avoid friction and shearing  - Provide appropriate hygiene as needed including keeping skin clean and dry  - Evaluate need for skin moisturizer/barrier cream  - Collaborate with interdisciplinary team   - Patient/family teaching  - Consider wound care consult   Outcome: Progressing     Problem: Nutrition/Hydration-ADULT  Goal: Nutrient/Hydration intake appropriate for improving, restoring or maintaining nutritional needs  Description: Monitor and assess patient's nutrition/hydration status for malnutrition. Collaborate with interdisciplinary team and initiate plan and interventions as ordered.  Monitor patient's weight and dietary intake as ordered or per policy. Utilize nutrition screening tool and intervene as necessary. Determine patient's food preferences and provide high-protein, high-caloric foods as appropriate.     INTERVENTIONS:  - Monitor oral intake, urinary output, labs, and treatment plans  - Assess nutrition and hydration status and recommend course of action  - Evaluate amount of meals eaten  - Assist patient with eating if necessary   - Allow adequate time for meals  - Recommend/ encourage appropriate diets, oral nutritional supplements, and vitamin/mineral supplements  - Order, calculate, and assess calorie counts as needed  - Recommend, monitor, and adjust tube feedings and TPN/PPN based on assessed needs  - Assess need for intravenous fluids  - Provide specific nutrition/hydration education as appropriate  - Include patient/family/caregiver in decisions related to nutrition  Outcome: Progressing     Problem: NEUROSENSORY - ADULT  Goal: Achieves maximal functionality and self care  Description: INTERVENTIONS  - Monitor swallowing and airway patency with patient fatigue and changes in neurological status  - Encourage and assist patient to increase activity and self care.   - Encourage visually impaired, hearing impaired and aphasic patients to use assistive/communication  devices  Outcome: Progressing

## 2024-08-03 NOTE — PROGRESS NOTES
Formerly [facility], later [facility]  Progress Note  Name: Anamaria Parnell I  MRN: [id number]  Unit/Bed#: [place] [address] -01 I Date of Admission: 7/19/2024   Date of Service: 8/3/2024 I Hospital Day: 15    Assessment & Plan   * Aortoiliac occlusive disease (HCC)  Assessment & Plan  PAD and aortic disease, R CLTI and R great toe dry gangrene. S/P Bilateral femoral endarterectomy with L to R fem-fem PTFE bypass and retrograde L JACI, L EIA stenting, bilateral groin VAC placement on 7/24   Intervention planned for Tuesday, 8/6    Acute blood loss anemia  Assessment & Plan  Patient noted to have anemia on 7/30 labs, likely secondary to recent surgical procedures  Has received 2 units PRBCs  Given other comorbidities secondary to acute blood loss anemia, will attempt to keep hemoglobin levels above 8  Currently hemoglobin 10.9; monitor on daily labs      Acute liver failure without hepatic coma  Assessment & Plan  Patient experienced profound elevation in LFTs on evening of 7/30-likely shock liver in the setting of acute anemia  Has been transfused, LFTs this morning much improved  Ammonia now within normal limits; consult to gastroenterology  Ultrasound right upper quadrant noted, some mild CBD dilation, no other abnormalities noted    Elevated troponin  Assessment & Plan  Patient noted to have elevated troponin this afternoon as part of encephalopathy workup; currently 4677  Tachycardic overnight and anemic, likely elevated in demand ischemia  Have reversed anemia with blood transfusion hemoglobin currently 10.5  Monitor on telemetry; EKG with no acute findings  Have discussed with cardiology, reverse likely contributing factors and reassess  Unfortunately, echocardiogram obtained and workup of elevated troponin shows worsening EF and global hypokinesis      Encephalopathy  Assessment & Plan  Patient noted to be acutely confused on morning of 7/30  Likely multifactorial attributable to late afternoon early evening  "anesthesia and lack of sleep overnight  Workup revealed several abnormalities as noted above, most have been reversed with transfusion of PRBCs  Mentation much improved; appreciate geriatric recommendations      Chronic HFrEF (heart failure with reduced ejection fraction) (Former[facility])  Assessment & Plan  Wt Readings from Last 3 Encounters:   08/03/24 63.2 kg (139 lb 5.3 oz)   07/19/24 67.2 kg (148 lb 2.4 oz)   07/15/24 65.3 kg (144 lb)     Followed by cardiology, continue diuretics  AV node blockers on hold secondary to bradycardia  Remains 96% SpO2 on room air, not in acute exacerbation        Paroxysmal atrial fibrillation (HCC)  Assessment & Plan  Converted spontaneously before she was placed on telemetry. ECG reviewed with cardiology.  Per cardiology: \"Given her anemia, DAPT for recent surgery, recent surgery and low A-fib burden, will hold off starting anticoagulation for now.\"     7/29-update: Patient now with tachybradycardia syndrome; cardiology recommending transfer to [place] sometime following Friday's vascular surgery procedure for possible implantation of pacemaker.    Coronary artery disease of native artery of native heart with stable angina pectoris (Former[facility])  Assessment & Plan  CAD with history of PCI.  No chest pain.  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, followed by podiatry    Primary hypertension  Assessment & Plan  Controlled  Continue amlodipine, clonidine, metoprolol    Type 2 diabetes mellitus, without long-term current use of insulin ([facility])  Assessment & Plan  Lab Results   Component Value Date    HGBA1C 5.9 (H) 07/09/2024     Recent Labs     " 08/02/24  1141 08/02/24  1551 08/02/24  2130 08/03/24  0722   POCGLU 219* 225* 217* 182*       Home regimen: Tradjenta  Inpatient regimen: Sliding scale    7/31-update: Patient's glucose levels not at goal, may represent physiological reaction to ongoing treatment plan.  Have added 10 units long-acting insulin daily, monitor and uptitrate/add prandial insulin as needed    8/1-update: Blood glucose level still above goal, will initiate 3 units prandial insulin 3 times daily with meals    8/3 -update: Uptitrate prandial insulin to 5 units 3 times daily with meals      Depression, recurrent (HCC)  Assessment & Plan  Continue Lexapro    Acute renal failure (ARF) ([facility])  Assessment & Plan  Baseline 1.4-1.7  Initial RICARDO likely due to sepsis / pre-renal.  Had resolved, but unfortunately patient with acute blood loss anemia 7/30 and creatinine spiked  Improving to 1.88 on morning labs following transfusion of 2 units PRBCs  Resolved, continue to monitor      Recent Labs     08/01/24  0440 08/02/24  0520   BUN 77* 69*   CREATININE 2.19* 1.88*   EGFR 21 25                 CVA (cerebral vascular accident) ([facility])  Assessment & Plan  History of CVA. MRI 7/2023 showing: Large area of acute to subacute ischemia left PCA distribution occipital lobe. Small foci of acute to subacute ischemia in the left frontal and left parietal/temporal lobes MCA distribution.  Continue Aspirin; statin temporarily on hold for acute hepatic failure  Restart once LFTs within normal limits    Basilar artery stenosis  Assessment & Plan  history of carotid surgery.    Follows with Dr. Gallo               VTE Pharmacologic Prophylaxis: VTE Score: 3 Moderate Risk (Score 3-4) - Pharmacological DVT Prophylaxis Ordered: heparin.    Mobility:   Basic Mobility Inpatient Raw Score: 8  -HLM Goal: 3: Sit at edge of bed  JH-HLM Achieved: 2: Bed activities/Dependent transfer  JH-HLM Goal NOT achieved. Continue with multidisciplinary rounding and encourage  appropriate mobility to improve upon [facility] goals.    Patient Centered Rounds: I performed bedside rounds with nursing staff today.   Discussions with Specialists or Other Care Team Provider:     Education and Discussions with Family / Patient: Discussed treatment plan with family and patient who agree with current plan; encouraged to ask questions and participate.      Total Time Spent on Date of Encounter in care of patient: 45 mins. This time was spent on one or more of the following: performing physical exam; counseling and coordination of care; obtaining or reviewing history; documenting in the medical record; reviewing/ordering tests, medications or procedures; communicating with other healthcare professionals and discussing with patient's family/caregivers.    Current Length of Stay: 15 day(s)  Current Patient Status: Inpatient   Certification Statement: The patient will continue to require additional inpatient hospital stay due to awaiting femoropopliteal bypass surgery  Discharge Plan: Anticipate discharge in >72 hrs to discharge location to be determined pending rehab evaluations.    Code Status: Level 3 - DNAR and DNI    Subjective:   Patient seen and examined bedside, overall appears very well and much stronger than previously.  States she feels well and mentation is back to baseline.  Patient awaiting femoropopliteal on .  Will update son via phone.  Labs and vitals largely stable, will repeat tomorrow.    Objective:     Vitals:   Temp (24hrs), Av.7 °F (36.5 °C), Min:97.2 °F (36.2 °C), Max:98 °F (36.7 °C)    Temp:  [97.2 °F (36.2 °C)-98 °F (36.7 °C)] 98 °F (36.7 °C)  HR:  [87-95] 87  Resp:  [16-18] 18  BP: (141-160)/() 149/74  SpO2:  [86 %-96 %] 96 %  Body mass index is 29.12 kg/m².     Input and Output Summary (last 24 hours):     Intake/Output Summary (Last 24 hours) at 8/3/2024 1546  Last data filed at 8/3/2024 0815  Gross per 24 hour   Intake 840 ml   Output 1110 ml   Net -270 ml        Physical Exam:   Physical Exam  Vitals and nursing note reviewed.   Constitutional:       General: She is not in acute distress.     Appearance: She is well-developed.   HENT:      Head: Normocephalic and atraumatic.   Eyes:      Conjunctiva/sclera: Conjunctivae normal.   Cardiovascular:      Rate and Rhythm: Normal rate. Rhythm irregular.   Pulmonary:      Effort: Pulmonary effort is normal. No respiratory distress.   Abdominal:      Palpations: Abdomen is soft.      Tenderness: There is no abdominal tenderness.   Musculoskeletal:         General: Swelling, deformity and signs of injury present.      Cervical back: Neck supple.      Right lower leg: Edema present.   Skin:     General: Skin is warm and dry.      Comments: Bilateral groin incisions   Neurological:      Mental Status: She is alert.   Psychiatric:         Mood and Affect: Mood normal.            Additional Data:     Labs:  Results from last 7 days   Lab Units 08/02/24  0520 07/31/24  1715 07/31/24  0502   WBC Thousand/uL 25.63*   < > 31.56*   HEMOGLOBIN g/dL 10.9*   < > 7.4*   HEMATOCRIT % 34.0*   < > 22.5*   PLATELETS Thousands/uL 272   < > 318   BANDS PCT %  --   --  4   SEGS PCT % 88*   < >  --    LYMPHO PCT % 2*   < > 3*   MONO PCT % 5   < > 5   EOS PCT % 0   < > 0    < > = values in this interval not displayed.     Results from last 7 days   Lab Units 08/02/24  0520   SODIUM mmol/L 141   POTASSIUM mmol/L 3.5   CHLORIDE mmol/L 104   CO2 mmol/L 25   BUN mg/dL 69*   CREATININE mg/dL 1.88*   ANION GAP mmol/L 12   CALCIUM mg/dL 8.4   ALBUMIN g/dL 3.4*   TOTAL BILIRUBIN mg/dL 1.19*   ALK PHOS U/L 143*   ALT U/L 246*   AST U/L 362*   GLUCOSE RANDOM mg/dL 193*     Results from last 7 days   Lab Units 07/31/24  1111   INR  1.70*     Results from last 7 days   Lab Units 08/03/24  1213 08/03/24  0722 08/02/24  2130 08/02/24  1551 08/02/24  1141 08/02/24  0755 08/01/24  2102 08/01/24  1601 08/01/24  1137 08/01/24  0741 07/31/24  2141 07/31/24  1634    POC GLUCOSE mg/dl 209* 182* 217* 225* 219* 188* 242* 227* 272* 233* 253* 247*         Results from last 7 days   Lab Units 07/31/24  0502 07/31/24  0121 07/30/24  2202   LACTIC ACID mmol/L 1.9 2.4* 4.6*       Lines/Drains:  Invasive Devices       Peripheral Intravenous Line  Duration             Peripheral IV 07/31/24 Right Antecubital 3 days                          Imaging: No pertinent imaging reviewed.    Recent Cultures (last 7 days):   Results from last 7 days   Lab Units 07/29/24  1805   GRAM STAIN RESULT  Rare Polys*  Rare Gram negative rods*       Last 24 Hours Medication List:   Current Facility-Administered Medications   Medication Dose Route Frequency Provider Last Rate    allopurinol  100 mg Oral Daily Costa Omer DPM      aspirin  81 mg Oral Daily Costa Omer DPM      cefTRIAXone  1,000 mg Intravenous Q24H Amaya Aldea, DO 1,000 mg (08/02/24 9264)    escitalopram  20 mg Oral Daily Costa Omer DPM      heparin (porcine)  5,000 Units Subcutaneous Q8H [facility] Cullen Reinoso MD      HYDROmorphone  0.2 mg Intravenous Q6H PRN Costa Omer DPM      insulin detemir  10 Units Subcutaneous Daily Cullen Reinoso MD      insulin lispro  1-5 Units Subcutaneous TID AC Costa Omer DPM      insulin lispro  5 Units Subcutaneous TID With Meals Cullen Reinoso MD      lidocaine  1 patch Topical Daily Costa Omer DPM      metoprolol succinate  25 mg Oral Daily Timothy Rey DO      ondansetron  4 mg Intravenous Q6H PRN Costa Omer DPM      oxyCODONE  5 mg Oral Q6H PRN Costa Omer DPM      sodium bicarbonate  650 mg Oral BID after meals Costa Omer DPM      ticagrelor  90 mg Oral Q12H [facility] Costa Omer DPM      torsemide  10 mg Oral Daily Costa Omer DPM          Today, Patient Was Seen By: Cullen Reinoso MD    **Please Note: This note may have been constructed using a voice recognition system.**

## 2024-08-03 NOTE — PROGRESS NOTES
"[facility] - Progress Note  Patient: Anamaria Parnell 79 y.o. female   MRN: [id number]  PCP: Ritchie Lawton MD  Unit/Bed#: [address]-01 Encounter: [id number]  Date Of Visit: 08/04/24    ASSESSMENT:    Anamaria Parnell is a 79 y.o. female with:    Right hallux gangrene, Davidson grade 4  - s/p right partial first ray amputation (DOS: 7/29/24)  T2DM (HbA1c - 5.9%)  CKD Stage 4  Aortoiliac occlusive disease  PAD      PLAN:    Dressings changed at bedside today. Patient s/p right partial 1st ray amputation packed open. Surgical site is stable at this time. No purulence expressed.   Podiatry to continue to monitor surgical site, patient will require repeat debridement with delayed primary closure once medically stable and pending vascular surgery recommendations.   Patient tentatively scheduled for vascular intervention 8/6 consisting of a right  femoral to below knee popliteal bypass with PTFE. Will follow up formal vascular recommendations.    Labs and vitals reviewed. Patient is afebrile with continued decreasing trend in leukocytosis noted. Will continue to monitor and trend.   Continue IV antibiotics of Ceftriaxone per infectious disease.   Continue local wound care of packing, DSD to right surgical site.   Podiatry to do all dressing changes.   Elevation on green foam wedges or pillows when non-ambulatory.  Rest of care per primary team.    Weight bearing status: Non weightbearing to right foot       SUBJECTIVE:     The patient was seen, evaluated, and assessed at bedside today. The patient was awake, alert, and in no acute distress. No acute events overnight. The patient reports she is feeling okay today however is getting tired of staying in bed all day. Patient denies N/V/F/chills/SOB/CP.      OBJECTIVE:     Vitals:   /78   Pulse 97   Temp 98 °F (36.7 °C)   Resp 17   Ht 4' 10\" (1.473 m)   Wt 63.4 kg (139 lb 12.4 oz)   LMP  (LMP Unknown)   SpO2 95%   BMI 29.21 kg/m²     Temp (24hrs), " Av °F (36.7 °C), Min:97.5 °F (36.4 °C), Max:98.4 °F (36.9 °C)      Physical Exam:     General:  Alert, cooperative, and in no distress.  Lower extremity exam:  Cardiovascular status at baseline.  Neurological status at baseline.  S/p right partial 1st ray amputation packed open. No calf tenderness noted.     Lower extremity wound(s) as noted below:  Wound #: 1  Location: Right partial first ray amputation  Length 5 cm: Width 3 cm: Depth 2 cm:   Deepest Tissue Noted in Base: Surgical bone  Probe to Bone: Yes - surgical   Peripheral Skin Description: Attached  Granulation: 40% Fibrotic Tissue: 60% Necrotic Tissue: 0%   Drainage Amount: minimal, serosanguinous  Signs of Infection: No    No signs of active infection: no purulence, no malodor, no ascending erythema, no crepitus, no fluctuance.    Clinical Images 24:        Additional Data:     Labs:    Results from last 7 days   Lab Units 24  0434   WBC Thousand/uL 16.63*   HEMOGLOBIN g/dL 10.1*   HEMATOCRIT % 31.0*   PLATELETS Thousands/uL 283   SEGS PCT % 82*   LYMPHO PCT % 6*   MONO PCT % 9   EOS PCT % 0     Results from last 7 days   Lab Units 24  0434   POTASSIUM mmol/L 2.9*   CHLORIDE mmol/L 99   CO2 mmol/L 32   BUN mg/dL 73*   CREATININE mg/dL 1.52*   CALCIUM mg/dL 8.4   ALK PHOS U/L 120*   ALT U/L 103*   AST U/L 52*     Results from last 7 days   Lab Units 24  1111   INR  1.70*       * I Have Reviewed All Lab Data Listed Above.    Recent Cultures (last 7 days):     Results from last 7 days   Lab Units 24  1805   GRAM STAIN RESULT  Rare Polys*  Rare Gram negative rods*     Results from last 7 days   Lab Units 24  1805   ANAEROBIC CULTURE  No anaerobes isolated       Imaging: I have personally reviewed pertinent films in PACS  EKG, Pathology, and Other Studies: I have personally reviewed pertinent reports.      ** Please Note: Portions of the record may have been created with voice recognition software. Occasional wrong word  "or \"sound a like\" substitutions may have occurred due to the inherent limitations of voice recognition software. Read the chart carefully and recognize, using context, where substitutions have occurred. **      "

## 2024-08-03 NOTE — ASSESSMENT & PLAN NOTE
Wt Readings from Last 3 Encounters:   08/03/24 63.2 kg (139 lb 5.3 oz)   07/19/24 67.2 kg (148 lb 2.4 oz)   07/15/24 65.3 kg (144 lb)     Followed by cardiology, continue diuretics  AV node blockers on hold secondary to bradycardia  Remains 96% SpO2 on room air, not in acute exacerbation

## 2024-08-03 NOTE — ASSESSMENT & PLAN NOTE
"Converted spontaneously before she was placed on telemetry. ECG reviewed with cardiology.  Per cardiology: \"Given her anemia, DAPT for recent surgery, recent surgery and low A-fib burden, will hold off starting anticoagulation for now.\"     7/29-update: Patient now with tachybradycardia syndrome; cardiology recommending transfer to [place] sometime following Friday's vascular surgery procedure for possible implantation of pacemaker.  "

## 2024-08-03 NOTE — PROGRESS NOTES
"Progress Note - Vascular Surgery   Anamaria Parnell 79 y.o. female MRN: [id number]  Unit/Bed#: [place] [address] -01 Encounter: [id number]    Assessment:  79-year-old female with AIOD/PAD, right lower extremity CLTI.  Status post:  - 7/24 Bilateral femoral endarterectomy, left to right femorofemoral bypass w/ PTFE, retrograde left iliac stenting, bilateral groin VAC placement.  - 7/29 right hallux amputation  Improving AMS, persistent leukocytosis     Plan:  -diet as tolerated  -cont wound vac changes MWF  -planned 8/6 R fem-BK bypass  -monitor WBC count  -ID following for abx Cards, nephro, GI, Geriatrics  -appreciate  -Cont ASA/brillinta  -would recommend resuming statin when able  -analgesia and anti-emetics  -dvt ppx  -oob and ambulation TID  -encourage IS use  -PT/OT to eval and treat  -rest of care per primary    Subjective/Objective     Subjective: no acute events overnight.  Patient resting comfortably in bed.  Patient does not discuss close overt discomfort.  Patient expresses frustration with remaining in the hospital for this extended period of time.    Objective: Afebrile on room air x 1 O2 sat 86% now improved to >90%    Blood pressure 160/100, pulse 95, temperature 97.5 °F (36.4 °C), resp. rate 16, height 4' 10\" (1.473 m), weight 63.2 kg (139 lb 5.3 oz), SpO2 90%.,Body mass index is 29.12 kg/m².    UOP: 430 cc +1 unmeasured  VAC: 175 cc serous sang      Invasive Devices       Peripheral Intravenous Line  Duration             Peripheral IV 07/31/24 Right Antecubital 3 days                    Physical Exam:  General: No acute distress, alert and oriented  CV: RRR  Lungs: Normal work of breathing   Abdomen: s/nt/nd  Extremities: Bilateral DP PT signals  Skin: Warm, dry    Lab, Imaging and other studies:  Recent Labs     08/01/24  0440 08/02/24  0520   WBC 33.34* 25.63*   HGB 10.5* 10.9*    272   SODIUM 143 141   K 4.1 3.5    104   CO2 26 25   BUN 77* 69*   CREATININE 2.19* 1.88*   GLUC 231* 193* "   CALCIUM 8.6 8.4   MG 2.3 2.1   PHOS 4.3* 3.9   * 362*   * 246*   ALKPHOS 139* 143*   TBILI 1.32* 1.19*     VTE Pharmacologic Prophylaxis: Heparin  VTE Mechanical Prophylaxis: sequential compression device

## 2024-08-03 NOTE — PLAN OF CARE
Problem: PAIN - ADULT  Goal: Verbalizes/displays adequate comfort level or baseline comfort level  Description: Interventions:  - Encourage patient to monitor pain and request assistance  - Assess pain using appropriate pain scale  - Administer analgesics based on type and severity of pain and evaluate response  - Implement non-pharmacological measures as appropriate and evaluate response  - Consider cultural and social influences on pain and pain management  - Notify physician/advanced practitioner if interventions unsuccessful or patient reports new pain  Outcome: Progressing     Problem: INFECTION - ADULT  Goal: Absence or prevention of progression during hospitalization  Description: INTERVENTIONS:  - Assess and monitor for signs and symptoms of infection  - Monitor lab/diagnostic results  - Monitor all insertion sites, i.e. indwelling lines, tubes, and drains  - Monitor endotracheal if appropriate and nasal secretions for changes in amount and color  - [place] appropriate cooling/warming therapies per order  - Administer medications as ordered  - Instruct and encourage patient and family to use good hand hygiene technique  - Identify and instruct in appropriate isolation precautions for identified infection/condition  Outcome: Progressing     Problem: SAFETY ADULT  Goal: Patient will remain free of falls  Description: INTERVENTIONS:  - Educate patient/family on patient safety including physical limitations  - Instruct patient to call for assistance with activity   - Consult OT/PT to assist with strengthening/mobility   - Keep Call bell within reach  - Keep bed low and locked with side rails adjusted as appropriate  - Keep care items and personal belongings within reach  - Initiate and maintain comfort rounds  - Make Fall Risk Sign visible to staff  - Offer Toileting every 2 Hours, in advance of need  - Initiate/Maintain bed alarm  - Apply yellow socks and bracelet for high fall risk patients  - Consider  moving patient to room near nurses station  Outcome: Progressing  Goal: Maintain or return to baseline ADL function  Description: INTERVENTIONS:  -  Assess patient's ability to carry out ADLs; assess patient's baseline for ADL function and identify physical deficits which impact ability to perform ADLs (bathing, care of mouth/teeth, toileting, grooming, dressing, etc.)  - Assess/evaluate cause of self-care deficits   - Assess range of motion  - Assess patient's mobility; develop plan if impaired  - Assess patient's need for assistive devices and provide as appropriate  - Encourage maximum independence but intervene and supervise when necessary  - Involve family in performance of ADLs  - Assess for home care needs following discharge   - Consider OT consult to assist with ADL evaluation and planning for discharge  - Provide patient education as appropriate  Outcome: Progressing  Goal: Maintains/Returns to pre admission functional level  Description: INTERVENTIONS:  - Perform AM-PAC 6 Click Basic Mobility/ Daily Activity assessment daily.  - Set and communicate daily mobility goal to care team and patient/family/caregiver.   - Collaborate with rehabilitation services on mobility goals if consulted  - Stand patient 3 times a day  - Ambulate patient 3 times a day  - Out of bed to chair 3 times a day   - Out of bed for meals 3 times a day  - Out of bed for toileting  - Record patient progress and toleration of activity level   Outcome: Progressing     Problem: DISCHARGE PLANNING  Goal: Discharge to home or other facility with appropriate resources  Description: INTERVENTIONS:  - Identify barriers to discharge w/patient and caregiver  - Arrange for needed discharge resources and transportation as appropriate  - Identify discharge learning needs (meds, wound care, etc.)  - Arrange for interpretive services to assist at discharge as needed  - Refer to Case Management Department for coordinating discharge planning if the  patient needs post-hospital services based on physician/advanced practitioner order or complex needs related to functional status, cognitive ability, or social support system  Outcome: Progressing     Problem: Knowledge Deficit  Goal: Patient/family/caregiver demonstrates understanding of disease process, treatment plan, medications, and discharge instructions  Description: Complete learning assessment and assess knowledge base.  Interventions:  - Provide teaching at level of understanding  - Provide teaching via preferred learning methods  Outcome: Progressing     Problem: CARDIOVASCULAR - ADULT  Goal: Maintains optimal cardiac output and hemodynamic stability  Description: INTERVENTIONS:  - Monitor I/O, vital signs and rhythm  - Monitor for S/S and trends of decreased cardiac output  - Administer and titrate ordered vasoactive medications to optimize hemodynamic stability  - Assess quality of pulses, skin color and temperature  - Assess for signs of decreased coronary artery perfusion  - Instruct patient to report change in severity of symptoms  Outcome: Progressing     Problem: METABOLIC, FLUID AND ELECTROLYTES - ADULT  Goal: Electrolytes maintained within normal limits  Description: INTERVENTIONS:  - Monitor labs and assess patient for signs and symptoms of electrolyte imbalances  - Administer electrolyte replacement as ordered  - Monitor response to electrolyte replacements, including repeat lab results as appropriate  - Instruct patient on fluid and nutrition as appropriate  Outcome: Progressing  Goal: Fluid balance maintained  Description: INTERVENTIONS:  - Monitor labs   - Monitor I/O and WT  - Instruct patient on fluid and nutrition as appropriate  - Assess for signs & symptoms of volume excess or deficit  Outcome: Progressing  Goal: Glucose maintained within target range  Description: INTERVENTIONS:  - Monitor Blood Glucose as ordered  - Assess for signs and symptoms of hyperglycemia and hypoglycemia  -  Administer ordered medications to maintain glucose within target range  - Assess nutritional intake and initiate nutrition service referral as needed  Outcome: Progressing     Problem: HEMATOLOGIC - ADULT  Goal: Maintains hematologic stability  Description: INTERVENTIONS  - Assess for signs and symptoms of bleeding or hemorrhage  - Monitor labs  - Administer supportive blood products/factors as ordered and appropriate  Outcome: Progressing     Problem: MUSCULOSKELETAL - ADULT  Goal: Maintain or return mobility to safest level of function  Description: INTERVENTIONS:  - Assess patient's ability to carry out ADLs; assess patient's baseline for ADL function and identify physical deficits which impact ability to perform ADLs (bathing, care of mouth/teeth, toileting, grooming, dressing, etc.)  - Assess/evaluate cause of self-care deficits   - Assess range of motion  - Assess patient's mobility  - Assess patient's need for assistive devices and provide as appropriate  - Encourage maximum independence but intervene and supervise when necessary  - Involve family in performance of ADLs  - Assess for home care needs following discharge   - Consider OT consult to assist with ADL evaluation and planning for discharge  - Provide patient education as appropriate  Outcome: Progressing  Goal: Maintain proper alignment of affected body part  Description: INTERVENTIONS:  - Support, maintain and protect limb and body alignment  - Provide patient/ family with appropriate education  Outcome: Progressing     Problem: Prexisting or High Potential for Compromised Skin Integrity  Goal: Skin integrity is maintained or improved  Description: INTERVENTIONS:  - Identify patients at risk for skin breakdown  - Assess and monitor skin integrity  - Assess and monitor nutrition and hydration status  - Monitor labs   - Assess for incontinence   - Turn and reposition patient  - Assist with mobility/ambulation  - Relieve pressure over bony  prominences  - Avoid friction and shearing  - Provide appropriate hygiene as needed including keeping skin clean and dry  - Evaluate need for skin moisturizer/barrier cream  - Collaborate with interdisciplinary team   - Patient/family teaching  - Consider wound care consult   Outcome: Progressing     Problem: Nutrition/Hydration-ADULT  Goal: Nutrient/Hydration intake appropriate for improving, restoring or maintaining nutritional needs  Description: Monitor and assess patient's nutrition/hydration status for malnutrition. Collaborate with interdisciplinary team and initiate plan and interventions as ordered.  Monitor patient's weight and dietary intake as ordered or per policy. Utilize nutrition screening tool and intervene as necessary. Determine patient's food preferences and provide high-protein, high-caloric foods as appropriate.     INTERVENTIONS:  - Monitor oral intake, urinary output, labs, and treatment plans  - Assess nutrition and hydration status and recommend course of action  - Evaluate amount of meals eaten  - Assist patient with eating if necessary   - Allow adequate time for meals  - Recommend/ encourage appropriate diets, oral nutritional supplements, and vitamin/mineral supplements  - Order, calculate, and assess calorie counts as needed  - Recommend, monitor, and adjust tube feedings and TPN/PPN based on assessed needs  - Assess need for intravenous fluids  - Provide specific nutrition/hydration education as appropriate  - Include patient/family/caregiver in decisions related to nutrition  Outcome: Progressing     Problem: NEUROSENSORY - ADULT  Goal: Achieves maximal functionality and self care  Description: INTERVENTIONS  - Monitor swallowing and airway patency with patient fatigue and changes in neurological status  - Encourage and assist patient to increase activity and self care.   - Encourage visually impaired, hearing impaired and aphasic patients to use assistive/communication  devices  Outcome: Progressing

## 2024-08-03 NOTE — PROGRESS NOTES
[facility]  Progress Note  Name: Anamaria Parnell I  MRN: [id number]  Unit/Bed#: [place] [address] -01 I Date of Admission: 7/19/2024   Date of Service: 8/3/2024 I Hospital Day: 15    Assessment & Plan   * Aortoiliac occlusive disease (HCC)  Assessment & Plan  80 yo female ex-smoker w/ a hx of obesity, CKD IV, DM II w/ peripheral neuropathy, HLD, HTN, CAD, L MCA/PCA CVA S/P L TCAR 9/7/23 ([facility]), FÉLIX, mesenteric artery stenosis and aortoiliac occlusive disease w/ PAD and chronic R hallux dry gangrene presented to [facility] on 7/16/24 w/ worsening R great toe pain and R hallux gangrene. Pt was transferred to [facility] on 7/19 w/ plans for R femoral endarterectomy w/ retrograde stenting and RLE bypass. Patient is now s/p B/L CFAE, L JACI/EIA balloon angio and shockwave lithotripsy w/ insertion of a L EIA drug-coated stent and L JACI VBX stent, L to R PTFE fem-fem bypass, and B/L groin vac placement on 7/24.     Seen in consult by podiatry for R hallux dry gangrene now s/p right hallux amputation 7/29/24.  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  Cardiology following with plans for eventual PPM placement after revascularization procedure. GI consulted due to encephalopathy w/ worsening LFTs and lactic acidosis attributed to shock liver vs fulminant hepatitis. Troponin obtained as part of encephalopathy workup was initially 4,677 w/ EKG unchanged from prior, per cardiology.     Pt is s/p 3U PRBC and 1U FFP intra-op and 1U PRBC on 7/30 and 7/31.      Imaging:  -7/30/24 CT head: Chronic left PCA territory infarct. No evidence of acute infarct, intracranial hemorrhage or mass.  -7/29/24 LEAD: Right: Evidence of patent endarterectomy with diffuse disease noted throughout  the femoral-popliteal arteries without significant focal stenosis. LISA: 0.53/48/22. Left: Evidence of patent endarterectomy with diffuse disease noted throughout the femoral-popliteal arteries without significant focal stenosis. L LISA: 0.57/10/43  -7/29/24 Xray R foot: Increasing volume of air within the soft tissues of the first toe in this patient with provided history of gangrene. The previously described cortical destruction along the ventral aspect of the first distal phalanx is not well visualized on the current examination, likely attributed to differences in patient positioning.  -7/22/24 Xray R foot: Cortical destruction along the ventral aspect of the first distal phalanx worrisome for acute osteomyelitis. Large air within the overlying soft tissues.   -7/21/24 Echo: Left ventricular wall thickness is severely increased. The left ventricular ejection fraction is 41% by biplane measurement. Systolic function is mildly reduced. Global longitudinal strain is reduced at -10% with moderate to severe strain reduction in the ED mid to basal anteroseptal septal and inferior walls.. Unable to grade diastolic dysfunction given the severe degree of mitral annular calcification.   -7/18/24 CTA abd w/ runoff: Visualized descending thoracic and suprarenal abdominal aorta demonstrate marked soft plaque with multifocal regions of plaque ulceration. A point of relative stenosis within the infrarenal abdominal aorta measures up to 7 mm in diameter. Bilateral multifocal severe stenosis within the external iliac and common femoral arteries. Bilateral long segment SFA occlusion extending from the ostia to the level of the P1 popliteal artery. Three-vessel runoff on the right, the posterior tibial artery is dominant. Two-vessel runoff on the left, the peroneal artery is dominant. Segmental visualization of the posterior tibial artery. Interval increase in size of an exophytic lesion arising medially from the stomach, again  consistent with gastrointestinal stromal tumor. New dilation of the pancreatic duct within the pancreatic head, no obvious pancreatic/ampulla lesion identified.  -7/16/24 LEAD: Right: Severe diffuse disease noted throughout the femoral-popliteal arteries with high grade stenosis vs occlusion of the proximal-distal SFA with reconstitution to the popliteal artery and high grade stenosis vs occlusion of the distal YOVANY. R LISA: 0.11/-/-. Left: Severe diffuse disease noted throughout the femoral-popliteal arteries with high grade stenosis vs occlusion of the proximal-mid SFA with reconstitution in the distal SFA and high grade stenosis vs occlusion of the entire PTA. L LISA: 0.31/20/19.     Plan:   -Advanced calcific atherosclerotic AIOD/PAD w/ CTLI and R hallux dry gangrene (+) OM now s/p R hallux amputation w/ partial 1st ray resection by podiatry on 7/29  -S/P B/L CFAE, L JACI/EIA balloon angio and shockwave lithotripsy w/ insertion of a L EIA drug-coated stent and L JACI VBX stent, L to R PTFE fem-fem bypass, and B/L groin vac placement on 7/24 (POD #10)  -B/L groin wound vacs intact w/ adequate suction; continue wound vac changes qMWF  -Plan for femoral to BK pop bypass in OR; tentatively scheduled for 8/6  -ID following for ABX management, input appreciated; surgical cultures growing Morganella morganii  -Cardiology following w/ plans for eventual consideration for PPM once infection resolved and revascularization complete   -Continue to monitor Hgb and transfuse < 7.0 per primary team  -Continue ASA, brilinta and pravastatin for medical management  -GI sign off, LFTs down trending. No indication for further imaging or intervention.  -Geriatrics input appreciated  -Continue medical management per primary team  -PT/OT follownig  -Will discuss w/ Dr. Gallo      Subjective:  Patient evaluated resting comfortably in bed.  She is reporting significant improvement in her pain following right hallux amputation.  Patient  "reports feeling better overall and is denying any chest pain or shortness of breath.  No acute events overnight.  Patient's only complaint is of sacral discomfort related to pressure injuries.    Vitals:  /100   Pulse 95   Temp 97.5 °F (36.4 °C)   Resp 16   Ht 4' 10\" (1.473 m)   Wt 63.2 kg (139 lb 5.3 oz)   LMP  (LMP Unknown)   SpO2 90%   BMI 29.12 kg/m²     I/Os:  I/O last 3 completed shifts:  In: 1560 [P.O.:1560]  Out: 1410 [Urine:910; Drains:500]  No intake/output data recorded.    Lab Results and Cultures:   CBC with diff:   Lab Results   Component Value Date    WBC 25.63 (H) 08/02/2024    HGB 10.9 (L) 08/02/2024    HCT 34.0 (L) 08/02/2024    MCV 92 08/02/2024     08/02/2024    RBC 3.68 (L) 08/02/2024    MCH 29.6 08/02/2024    MCHC 32.1 08/02/2024    RDW 21.2 (H) 08/02/2024    MPV 10.5 08/02/2024    NRBC 1 08/02/2024   ,   BMP/CMP:  Lab Results   Component Value Date    SODIUM 141 08/02/2024    SODIUM 138 10/20/2020    K 3.5 08/02/2024    K 3.8 10/20/2020     08/02/2024    CL 98 10/20/2020    CO2 25 08/02/2024    CO2 18 (L) 07/24/2024    CO2 23 10/20/2020    BUN 69 (H) 08/02/2024    BUN 45 (H) 10/20/2020    CREATININE 1.88 (H) 08/02/2024    GLUCOSE 179 (H) 07/24/2024    CALCIUM 8.4 08/02/2024     (H) 08/02/2024    AST 14 09/08/2020     (H) 08/02/2024    ALT 13 09/08/2020    ALKPHOS 143 (H) 08/02/2024    EGFR 25 08/02/2024   ,   Lipid Panel: No results found for: \"CHOL\",   Coags:   Lab Results   Component Value Date    PTT 30 07/30/2024    INR 1.70 (H) 07/31/2024   ,     Blood Culture:   Lab Results   Component Value Date    BLOODCX No Growth After 5 Days. 07/17/2024   ,   Urinalysis:   Lab Results   Component Value Date    COLORU Light Yellow 07/17/2024    CLARITYU Clear 07/17/2024    SPECGRAV 1.016 07/17/2024    PHUR 5.5 07/17/2024    LEUKOCYTESUR Moderate (A) 07/17/2024    NITRITE Negative 07/17/2024    GLUCOSEU Negative 07/17/2024    KETONESU Negative 07/17/2024    " "BILIRUBINUR Negative 07/17/2024    BLOODU Negative 07/17/2024   ,   Urine Culture:   Lab Results   Component Value Date    URINECX (A) 07/17/2024     >100,000 cfu/ml - Lactobacillus gasseri Lactobacillus species    URINECX <10,000 cfu/ml Micrococcus luteus (A) 07/17/2024    URINECX (A) 07/17/2024     <10,000 cfu/ml - Lactobacillus rhamnosus Lactobacillus species   ,   Wound Culure: No results found for: \"WOUNDCULT\"    Medications:  Current Facility-Administered Medications   Medication Dose Route Frequency    allopurinol (ZYLOPRIM) tablet 100 mg  100 mg Oral Daily    aspirin chewable tablet 81 mg  81 mg Oral Daily    cefTRIAXone (ROCEPHIN) 1,000 mg in dextrose 5 % 50 mL IVPB  1,000 mg Intravenous Q24H    escitalopram (LEXAPRO) tablet 20 mg  20 mg Oral Daily    heparin (porcine) subcutaneous injection 5,000 Units  5,000 Units Subcutaneous Q8H JAVIER    HYDROmorphone HCl (DILAUDID) injection 0.2 mg  0.2 mg Intravenous Q6H PRN    insulin detemir (LEVEMIR) subcutaneous injection 10 Units  10 Units Subcutaneous Daily    insulin lispro (HumALOG/ADMELOG) 100 units/mL subcutaneous injection 1-5 Units  1-5 Units Subcutaneous TID AC    insulin lispro (HumALOG/ADMELOG) 100 units/mL subcutaneous injection 5 Units  5 Units Subcutaneous TID With Meals    lidocaine (LIDODERM) 5 % patch 1 patch  1 patch Topical Daily    metoprolol succinate (TOPROL-XL) 24 hr tablet 25 mg  25 mg Oral Daily    ondansetron (ZOFRAN) injection 4 mg  4 mg Intravenous Q6H PRN    oxyCODONE (ROXICODONE) IR tablet 5 mg  5 mg Oral Q6H PRN    sodium bicarbonate tablet 650 mg  650 mg Oral BID after meals    ticagrelor (BRILINTA) tablet 90 mg  90 mg Oral Q12H JAVIER    torsemide (DEMADEX) tablet 10 mg  10 mg Oral Daily       Physical Exam:    General: NAD  CV: regular rate and rhythm  Respiratory: CTA bilaterally, normal effort  Abdominal: soft, nontender, nondistended  Extremities: warm and well perfused, dressing to right foot is clean dry and intact  Neurologic: " alert and oriented to person, place, and time. No focal deficit      Pulse exam:  DP: Right: doppler signal Left: doppler signal  PT: Right: doppler signal Left: doppler signal     GRACIELA Aguero  8/3/2024         no Active ROM deficits were identified

## 2024-08-03 NOTE — ASSESSMENT & PLAN NOTE
Baseline 1.4-1.7  Initial RICARDO likely due to sepsis / pre-renal.  Had resolved, but unfortunately patient with acute blood loss anemia 7/30 and creatinine spiked  Improving to 1.88 on morning labs following transfusion of 2 units PRBCs  Resolved, continue to monitor      Recent Labs     08/01/24  0440 08/02/24  0520   BUN 77* 69*   CREATININE 2.19* 1.88*   EGFR 21 25

## 2024-08-03 NOTE — ASSESSMENT & PLAN NOTE
Lab Results   Component Value Date    HGBA1C 5.9 (H) 07/09/2024     Recent Labs     08/02/24  1141 08/02/24  1551 08/02/24  2130 08/03/24  0722   POCGLU 219* 225* 217* 182*       Home regimen: Tradjenta  Inpatient regimen: Sliding scale    7/31-update: Patient's glucose levels not at goal, may represent physiological reaction to ongoing treatment plan.  Have added 10 units long-acting insulin daily, monitor and uptitrate/add prandial insulin as needed    8/1-update: Blood glucose level still above goal, will initiate 3 units prandial insulin 3 times daily with meals    8/3 -update: Uptitrate prandial insulin to 5 units 3 times daily with meals

## 2024-08-04 LAB
ALBUMIN SERPL BCG-MCNC: 3 G/DL (ref 3.5–5)
ALP SERPL-CCNC: 120 U/L (ref 34–104)
ALT SERPL W P-5'-P-CCNC: 103 U/L (ref 7–52)
ANION GAP SERPL CALCULATED.3IONS-SCNC: 10 MMOL/L (ref 4–13)
AST SERPL W P-5'-P-CCNC: 52 U/L (ref 13–39)
BASOPHILS # BLD AUTO: 0.06 THOUSANDS/ÂΜL (ref 0–0.1)
BASOPHILS NFR BLD AUTO: 0 % (ref 0–1)
BILIRUB SERPL-MCNC: 0.96 MG/DL (ref 0.2–1)
BUN SERPL-MCNC: 73 MG/DL (ref 5–25)
CALCIUM ALBUM COR SERPL-MCNC: 9.2 MG/DL (ref 8.3–10.1)
CALCIUM SERPL-MCNC: 8.4 MG/DL (ref 8.4–10.2)
CHLORIDE SERPL-SCNC: 99 MMOL/L (ref 96–108)
CO2 SERPL-SCNC: 32 MMOL/L (ref 21–32)
CREAT SERPL-MCNC: 1.52 MG/DL (ref 0.6–1.3)
EOSINOPHIL # BLD AUTO: 0.07 THOUSAND/ÂΜL (ref 0–0.61)
EOSINOPHIL NFR BLD AUTO: 0 % (ref 0–6)
ERYTHROCYTE [DISTWIDTH] IN BLOOD BY AUTOMATED COUNT: 21.5 % (ref 11.6–15.1)
GFR SERPL CREATININE-BSD FRML MDRD: 32 ML/MIN/1.73SQ M
GLUCOSE SERPL-MCNC: 135 MG/DL (ref 65–140)
GLUCOSE SERPL-MCNC: 168 MG/DL (ref 65–140)
GLUCOSE SERPL-MCNC: 188 MG/DL (ref 65–140)
GLUCOSE SERPL-MCNC: 243 MG/DL (ref 65–140)
GLUCOSE SERPL-MCNC: 269 MG/DL (ref 65–140)
HCT VFR BLD AUTO: 31 % (ref 34.8–46.1)
HGB BLD-MCNC: 10.1 G/DL (ref 11.5–15.4)
IMM GRANULOCYTES # BLD AUTO: 0.48 THOUSAND/UL (ref 0–0.2)
IMM GRANULOCYTES NFR BLD AUTO: 3 % (ref 0–2)
LYMPHOCYTES # BLD AUTO: 1 THOUSANDS/ÂΜL (ref 0.6–4.47)
LYMPHOCYTES NFR BLD AUTO: 6 % (ref 14–44)
MAGNESIUM SERPL-MCNC: 1.8 MG/DL (ref 1.9–2.7)
MCH RBC QN AUTO: 31.5 PG (ref 26.8–34.3)
MCHC RBC AUTO-ENTMCNC: 32.6 G/DL (ref 31.4–37.4)
MCV RBC AUTO: 97 FL (ref 82–98)
MONOCYTES # BLD AUTO: 1.46 THOUSAND/ÂΜL (ref 0.17–1.22)
MONOCYTES NFR BLD AUTO: 9 % (ref 4–12)
NEUTROPHILS # BLD AUTO: 13.56 THOUSANDS/ÂΜL (ref 1.85–7.62)
NEUTS SEG NFR BLD AUTO: 82 % (ref 43–75)
NRBC BLD AUTO-RTO: 0 /100 WBCS
PHOSPHATE SERPL-MCNC: 3.3 MG/DL (ref 2.3–4.1)
PLATELET # BLD AUTO: 283 THOUSANDS/UL (ref 149–390)
PMV BLD AUTO: 11.3 FL (ref 8.9–12.7)
POTASSIUM SERPL-SCNC: 2.9 MMOL/L (ref 3.5–5.3)
POTASSIUM SERPL-SCNC: 4.4 MMOL/L (ref 3.5–5.3)
PROT SERPL-MCNC: 5.6 G/DL (ref 6.4–8.4)
RBC # BLD AUTO: 3.21 MILLION/UL (ref 3.81–5.12)
SODIUM SERPL-SCNC: 141 MMOL/L (ref 135–147)
WBC # BLD AUTO: 16.63 THOUSAND/UL (ref 4.31–10.16)

## 2024-08-04 PROCEDURE — 99024 POSTOP FOLLOW-UP VISIT: CPT | Performed by: SURGERY

## 2024-08-04 PROCEDURE — 83735 ASSAY OF MAGNESIUM: CPT | Performed by: INTERNAL MEDICINE

## 2024-08-04 PROCEDURE — 99233 SBSQ HOSP IP/OBS HIGH 50: CPT | Performed by: INTERNAL MEDICINE

## 2024-08-04 PROCEDURE — NC001 PR NO CHARGE: Performed by: SURGERY

## 2024-08-04 PROCEDURE — 84132 ASSAY OF SERUM POTASSIUM: CPT | Performed by: INTERNAL MEDICINE

## 2024-08-04 PROCEDURE — NC001 PR NO CHARGE: Performed by: PODIATRIST

## 2024-08-04 PROCEDURE — 99232 SBSQ HOSP IP/OBS MODERATE 35: CPT | Performed by: INTERNAL MEDICINE

## 2024-08-04 PROCEDURE — 80053 COMPREHEN METABOLIC PANEL: CPT | Performed by: INTERNAL MEDICINE

## 2024-08-04 PROCEDURE — 84100 ASSAY OF PHOSPHORUS: CPT | Performed by: INTERNAL MEDICINE

## 2024-08-04 PROCEDURE — 85025 COMPLETE CBC W/AUTO DIFF WBC: CPT | Performed by: INTERNAL MEDICINE

## 2024-08-04 PROCEDURE — 82948 REAGENT STRIP/BLOOD GLUCOSE: CPT

## 2024-08-04 RX ORDER — MAGNESIUM SULFATE HEPTAHYDRATE 40 MG/ML
2 INJECTION, SOLUTION INTRAVENOUS ONCE
Status: COMPLETED | OUTPATIENT
Start: 2024-08-04 | End: 2024-08-04

## 2024-08-04 RX ORDER — ONDANSETRON 2 MG/ML
4 INJECTION INTRAMUSCULAR; INTRAVENOUS ONCE AS NEEDED
OUTPATIENT
Start: 2024-08-04

## 2024-08-04 RX ORDER — SODIUM CHLORIDE 9 MG/ML
125 INJECTION, SOLUTION INTRAVENOUS CONTINUOUS
OUTPATIENT
Start: 2024-08-04

## 2024-08-04 RX ORDER — FENTANYL CITRATE/PF 50 MCG/ML
25 SYRINGE (ML) INJECTION
OUTPATIENT
Start: 2024-08-04

## 2024-08-04 RX ORDER — HEPARIN SODIUM 5000 [USP'U]/ML
5000 INJECTION, SOLUTION INTRAVENOUS; SUBCUTANEOUS EVERY 8 HOURS SCHEDULED
Status: DISCONTINUED | OUTPATIENT
Start: 2024-08-04 | End: 2024-08-05

## 2024-08-04 RX ORDER — POTASSIUM CHLORIDE 14.9 MG/ML
20 INJECTION INTRAVENOUS
Status: COMPLETED | OUTPATIENT
Start: 2024-08-04 | End: 2024-08-04

## 2024-08-04 RX ORDER — POTASSIUM CHLORIDE 20 MEQ/1
40 TABLET, EXTENDED RELEASE ORAL EVERY 4 HOURS
Status: COMPLETED | OUTPATIENT
Start: 2024-08-04 | End: 2024-08-04

## 2024-08-04 RX ADMIN — ASPIRIN 81 MG CHEWABLE TABLET 81 MG: 81 TABLET CHEWABLE at 07:49

## 2024-08-04 RX ADMIN — TICAGRELOR 90 MG: 90 TABLET ORAL at 23:28

## 2024-08-04 RX ADMIN — POTASSIUM CHLORIDE 40 MEQ: 1500 TABLET, EXTENDED RELEASE ORAL at 15:25

## 2024-08-04 RX ADMIN — TICAGRELOR 90 MG: 90 TABLET ORAL at 07:50

## 2024-08-04 RX ADMIN — INSULIN DETEMIR 10 UNITS: 100 INJECTION, SOLUTION SUBCUTANEOUS at 07:51

## 2024-08-04 RX ADMIN — INSULIN LISPRO 2 UNITS: 100 INJECTION, SOLUTION INTRAVENOUS; SUBCUTANEOUS at 16:46

## 2024-08-04 RX ADMIN — POTASSIUM CHLORIDE 20 MEQ: 200 INJECTION, SOLUTION INTRAVENOUS at 15:25

## 2024-08-04 RX ADMIN — PANTOPRAZOLE SODIUM 40 MG: 40 INJECTION, POWDER, FOR SOLUTION INTRAVENOUS at 23:28

## 2024-08-04 RX ADMIN — ESCITALOPRAM OXALATE 20 MG: 20 TABLET ORAL at 07:50

## 2024-08-04 RX ADMIN — POTASSIUM CHLORIDE 40 MEQ: 1500 TABLET, EXTENDED RELEASE ORAL at 20:16

## 2024-08-04 RX ADMIN — SODIUM BICARBONATE 650 MG TABLET 650 MG: at 07:50

## 2024-08-04 RX ADMIN — METOPROLOL SUCCINATE 25 MG: 25 TABLET, EXTENDED RELEASE ORAL at 07:49

## 2024-08-04 RX ADMIN — POTASSIUM CHLORIDE 20 MEQ: 200 INJECTION, SOLUTION INTRAVENOUS at 16:44

## 2024-08-04 RX ADMIN — SODIUM BICARBONATE 650 MG TABLET 650 MG: at 15:25

## 2024-08-04 RX ADMIN — INSULIN LISPRO 5 UNITS: 100 INJECTION, SOLUTION INTRAVENOUS; SUBCUTANEOUS at 16:44

## 2024-08-04 RX ADMIN — TORSEMIDE 10 MG: 10 TABLET ORAL at 07:50

## 2024-08-04 RX ADMIN — HEPARIN SODIUM 5000 UNITS: 5000 INJECTION INTRAVENOUS; SUBCUTANEOUS at 23:28

## 2024-08-04 RX ADMIN — PANTOPRAZOLE SODIUM 40 MG: 40 INJECTION, POWDER, FOR SOLUTION INTRAVENOUS at 07:49

## 2024-08-04 RX ADMIN — MAGNESIUM SULFATE HEPTAHYDRATE 2 G: 40 INJECTION, SOLUTION INTRAVENOUS at 15:25

## 2024-08-04 RX ADMIN — ALLOPURINOL 100 MG: 100 TABLET ORAL at 07:50

## 2024-08-04 NOTE — ASSESSMENT & PLAN NOTE
Baseline 1.4-1.7  Initial RICARDO likely due to sepsis / pre-renal.  Had resolved, but unfortunately patient with acute blood loss anemia 7/30 and creatinine spiked  Improving to 1.88 on morning labs following transfusion of 2 units PRBCs  Resolved, continue to monitor      Recent Labs     08/02/24  0520 08/04/24  0434   BUN 69* 73*   CREATININE 1.88* 1.52*   EGFR 25 32

## 2024-08-04 NOTE — PROGRESS NOTES
[facility]  Progress Note  Name: Anamaria Parnell I  MRN: [id number]  Unit/Bed#: [place] [address] -01 I Date of Admission: 7/19/2024   Date of Service: 8/4/2024 I Hospital Day: 16    Assessment & Plan   * Aortoiliac occlusive disease (HCC)  Assessment & Plan  PAD and aortic disease, R CLTI and R great toe dry gangrene. S/P Bilateral femoral endarterectomy with L to R fem-fem PTFE bypass and retrograde L JACI, L EIA stenting, bilateral groin VAC placement on 7/24   Intervention planned for Tuesday, 8/6    Acute blood loss anemia  Assessment & Plan  Patient noted to have anemia on 7/30 labs, likely secondary to recent surgical procedures  Has received 2 units PRBCs  Given other comorbidities secondary to acute blood loss anemia, will attempt to keep hemoglobin levels above 8  Currently hemoglobin 10.1; monitor on daily labs      Acute liver failure without hepatic coma  Assessment & Plan  Patient experienced profound elevation in LFTs on evening of 7/30-likely shock liver in the setting of acute anemia  Has been transfused, LFTs this morning much improved  Ammonia now within normal limits; consult to gastroenterology  Ultrasound right upper quadrant noted, some mild CBD dilation, no other abnormalities noted    Elevated troponin  Assessment & Plan  Patient noted to have elevated troponin this afternoon as part of encephalopathy workup; currently 4677  Tachycardic overnight and anemic, likely elevated in demand ischemia  Have reversed anemia with blood transfusion hemoglobin currently 10.5  Monitor on telemetry; EKG with no acute findings  Have discussed with cardiology, reverse likely contributing factors and reassess  Unfortunately, echocardiogram obtained and workup of elevated troponin shows worsening EF and global hypokinesis      Encephalopathy  Assessment & Plan  Patient noted to be acutely confused on morning of 7/30  Likely multifactorial attributable to late afternoon early evening  "anesthesia and lack of sleep overnight  Workup revealed several abnormalities as noted above, most have been reversed with transfusion of PRBCs  Mentation much improved; appreciate geriatric recommendations      Chronic HFrEF (heart failure with reduced ejection fraction) ([facility])  Assessment & Plan  Wt Readings from Last 3 Encounters:   08/04/24 63.4 kg (139 lb 12.4 oz)   07/19/24 67.2 kg (148 lb 2.4 oz)   07/15/24 65.3 kg (144 lb)     Followed by cardiology, continue diuretics  AV node blockers on hold secondary to bradycardia  Remains 96% SpO2 on room air, not in acute exacerbation        Paroxysmal atrial fibrillation (HCC)  Assessment & Plan  Converted spontaneously before she was placed on telemetry. ECG reviewed with cardiology.  Per cardiology: \"Given her anemia, DAPT for recent surgery, recent surgery and low A-fib burden, will hold off starting anticoagulation for now.\"     7/29-update: Patient now with tachybradycardia syndrome; cardiology recommending transfer to [place] sometime following Friday's vascular surgery procedure for possible implantation of pacemaker.    Coronary artery disease of native artery of native heart with stable angina pectoris ([facility])  Assessment & Plan  CAD with history of PCI.  No chest pain.  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  Have added 10 units long-acting insulin daily, monitor and uptitrate/add prandial insulin as needed    8/1-update: Blood glucose level still above goal, will initiate 3 units prandial insulin 3 times daily with meals    8/3 -update: Uptitrate prandial insulin to 5 units 3 times daily with meals      Depression, recurrent (HCC)  Assessment & Plan  Continue Lexapro    Acute renal failure (ARF) ([facility])  Assessment & Plan  Baseline 1.4-1.7  Initial RICARDO likely due to sepsis / pre-renal.  Had resolved, but unfortunately patient with acute blood loss anemia 7/30 and creatinine spiked  Improving to 1.88 on morning labs following transfusion of 2 units PRBCs  Resolved, continue to monitor      Recent Labs     08/02/24  0520 08/04/24  0434   BUN 69* 73*   CREATININE 1.88* 1.52*   EGFR 25 32                 CVA (cerebral vascular accident) ([facility])  Assessment & Plan  History of CVA. MRI 7/2023 showing: Large area of acute to subacute ischemia left PCA distribution occipital lobe. Small foci of acute to subacute ischemia in the left frontal and left parietal/temporal lobes MCA distribution.  Continue Aspirin; statin temporarily on hold for acute hepatic failure  Restart once LFTs within normal limits    Basilar artery stenosis  Assessment & Plan  history of carotid surgery.    Follows with Dr. Gallo               VTE Pharmacologic Prophylaxis: VTE Score: 3 Moderate Risk (Score 3-4) - Pharmacological DVT Prophylaxis Ordered: heparin.    Mobility:   Basic Mobility Inpatient Raw Score: 8  JH-HLM Goal: 3: Sit at edge of bed  JH-HLM Achieved: 5: Stand (1 or more minutes)  JH-HLM Goal achieved. Continue to encourage appropriate mobility.    Patient Centered Rounds: I  performed bedside rounds with nursing staff today.   Discussions with Specialists or Other Care Team Provider: Vascular surgery    Education and Discussions with Family / Patient: Updated  (son) at bedside.    Total Time Spent on Date of Encounter in care of patient: 45 mins. This time was spent on one or more of the following: performing physical exam; counseling and coordination of care; obtaining or reviewing history; documenting in the medical record; reviewing/ordering tests, medications or procedures; communicating with other healthcare professionals and discussing with patient's family/caregivers.    Current Length of Stay: 16 day(s)  Current Patient Status: Inpatient   Certification Statement: The patient will continue to require additional inpatient hospital stay due to treatment of peripheral vascular disease/awaiting pacemaker and placement  Discharge Plan: Anticipate discharge in 48-72 hrs to rehab facility.    Code Status: Level 3 - DNAR and DNI    Subjective:   Patient seen and examined bedside, decision made with vascular surgery to forego surgery at this time.  Ongoing treatment of toe infection with antibiotics and wound VAC.  Podiatry following along.  Patient will need transfer to [place] for pacemaker insertion prior to discharge to rehab.  All discussed with patient's son at bedside.    Objective:     Vitals:   Temp (24hrs), Av °F (36.7 °C), Min:97.5 °F (36.4 °C), Max:98.4 °F (36.9 °C)    Temp:  [97.5 °F (36.4 °C)-98.4 °F (36.9 °C)] 98 °F (36.7 °C)  HR:  [84-97] 88  Resp:  [16-20] 16  BP: (126-156)/(65-78) 126/65  SpO2:  [81 %-98 %] 96 %  Body mass index is 29.21 kg/m².     Input and Output Summary (last 24 hours):     Intake/Output Summary (Last 24 hours) at 2024 1929  Last data filed at 2024 1701  Gross per 24 hour   Intake 480 ml   Output 815 ml   Net -335 ml       Physical Exam:   Physical Exam  Vitals and nursing note reviewed.   Constitutional:       General:  She is not in acute distress.     Appearance: She is well-developed.   HENT:      Head: Normocephalic and atraumatic.   Eyes:      Conjunctiva/sclera: Conjunctivae normal.   Cardiovascular:      Rate and Rhythm: Normal rate. Rhythm irregular.   Pulmonary:      Effort: Pulmonary effort is normal. No respiratory distress.   Abdominal:      Palpations: Abdomen is soft.      Tenderness: There is no abdominal tenderness.   Musculoskeletal:         General: Swelling, deformity and signs of injury present.      Cervical back: Neck supple.      Right lower leg: Edema present.   Skin:     General: Skin is warm and dry.      Comments: Bilateral groin incisions   Neurological:      Mental Status: She is alert.   Psychiatric:         Mood and Affect: Mood normal.            Additional Data:     Labs:  Results from last 7 days   Lab Units 08/04/24  0434 07/31/24  1715 07/31/24  0502   WBC Thousand/uL 16.63*   < > 31.56*   HEMOGLOBIN g/dL 10.1*   < > 7.4*   HEMATOCRIT % 31.0*   < > 22.5*   PLATELETS Thousands/uL 283   < > 318   BANDS PCT %  --   --  4   SEGS PCT % 82*   < >  --    LYMPHO PCT % 6*   < > 3*   MONO PCT % 9   < > 5   EOS PCT % 0   < > 0    < > = values in this interval not displayed.     Results from last 7 days   Lab Units 08/04/24  0434   SODIUM mmol/L 141   POTASSIUM mmol/L 2.9*   CHLORIDE mmol/L 99   CO2 mmol/L 32   BUN mg/dL 73*   CREATININE mg/dL 1.52*   ANION GAP mmol/L 10   CALCIUM mg/dL 8.4   ALBUMIN g/dL 3.0*   TOTAL BILIRUBIN mg/dL 0.96   ALK PHOS U/L 120*   ALT U/L 103*   AST U/L 52*   GLUCOSE RANDOM mg/dL 135     Results from last 7 days   Lab Units 07/31/24  1111   INR  1.70*     Results from last 7 days   Lab Units 08/04/24  1603 08/04/24  1203 08/04/24  0713 08/03/24  2114 08/03/24  1619 08/03/24  1213 08/03/24  0722 08/02/24  2130 08/02/24  1551 08/02/24  1141 08/02/24  0755 08/01/24  2102   POC GLUCOSE mg/dl 269* 188* 168* 168* 304* 209* 182* 217* 225* 219* 188* 242*         Results from last 7 days    Lab Units 07/31/24  0502 07/31/24  0121 07/30/24  2202   LACTIC ACID mmol/L 1.9 2.4* 4.6*       Lines/Drains:  Invasive Devices       Peripheral Intravenous Line  Duration             Peripheral IV 07/31/24 Right Antecubital 4 days    Peripheral IV 08/04/24 Left;Upper;Ventral (anterior) Arm <1 day                          Imaging: No pertinent imaging reviewed.    Recent Cultures (last 7 days):   Results from last 7 days   Lab Units 07/29/24  1805   GRAM STAIN RESULT  Rare Polys*  Rare Gram negative rods*       Last 24 Hours Medication List:   Current Facility-Administered Medications   Medication Dose Route Frequency Provider Last Rate    allopurinol  100 mg Oral Daily Costa Omer DPM      aspirin  81 mg Oral Daily Costa Omer DPM      cefTRIAXone  1,000 mg Intravenous Q24H Amaya Aldea DO 1,000 mg (08/03/24 2304)    escitalopram  20 mg Oral Daily Costa Omer DPM      HYDROmorphone  0.2 mg Intravenous Q6H PRN Costa Omer DPM      insulin detemir  10 Units Subcutaneous Daily Cullen Reinoso MD      insulin lispro  1-5 Units Subcutaneous TID AC Costa Omer DPM      insulin lispro  5 Units Subcutaneous TID With Meals Cullen Reinoso MD      lidocaine  1 patch Topical Daily Costa Omer DPM      metoprolol succinate  25 mg Oral Daily Timothy Rey DO      ondansetron  4 mg Intravenous Q6H PRN Costa Omer DPM      oxyCODONE  5 mg Oral Q6H PRN Costa Omer DPM      pantoprazole  40 mg Intravenous BID GRACIELA Ward      potassium chloride  40 mEq Oral Q4H Cullen Reinoso MD      sodium bicarbonate  650 mg Oral BID after meals Costa Omer DPM      ticagrelor  90 mg Oral Q12H JAVIER Costa Omer DPM      torsemide  10 mg Oral Daily Costa Omer DPM          Today, Patient Was Seen By: Cullen Reinoso MD    **Please Note: This note may have been constructed using a voice recognition system.**

## 2024-08-04 NOTE — PLAN OF CARE
Problem: PAIN - ADULT  Goal: Verbalizes/displays adequate comfort level or baseline comfort level  Description: Interventions:  - Encourage patient to monitor pain and request assistance  - Assess pain using appropriate pain scale  - Administer analgesics based on type and severity of pain and evaluate response  - Implement non-pharmacological measures as appropriate and evaluate response  - Consider cultural and social influences on pain and pain management  - Notify physician/advanced practitioner if interventions unsuccessful or patient reports new pain  Outcome: Progressing     Problem: INFECTION - ADULT  Goal: Absence or prevention of progression during hospitalization  Description: INTERVENTIONS:  - Assess and monitor for signs and symptoms of infection  - Monitor lab/diagnostic results  - Monitor all insertion sites, i.e. indwelling lines, tubes, and drains  - Monitor endotracheal if appropriate and nasal secretions for changes in amount and color  - [place] appropriate cooling/warming therapies per order  - Administer medications as ordered  - Instruct and encourage patient and family to use good hand hygiene technique  - Identify and instruct in appropriate isolation precautions for identified infection/condition  Outcome: Progressing     Problem: SAFETY ADULT  Goal: Patient will remain free of falls  Description: INTERVENTIONS:  - Educate patient/family on patient safety including physical limitations  - Instruct patient to call for assistance with activity   - Consult OT/PT to assist with strengthening/mobility   - Keep Call bell within reach  - Keep bed low and locked with side rails adjusted as appropriate  - Keep care items and personal belongings within reach  - Initiate and maintain comfort rounds  - Make Fall Risk Sign visible to staff  - Offer Toileting every 2 Hours, in advance of need  - Initiate/Maintain bed alarm  - Apply yellow socks and bracelet for high fall risk patients  - Consider  moving patient to room near nurses station  Outcome: Progressing  Goal: Maintain or return to baseline ADL function  Description: INTERVENTIONS:  -  Assess patient's ability to carry out ADLs; assess patient's baseline for ADL function and identify physical deficits which impact ability to perform ADLs (bathing, care of mouth/teeth, toileting, grooming, dressing, etc.)  - Assess/evaluate cause of self-care deficits   - Assess range of motion  - Assess patient's mobility; develop plan if impaired  - Assess patient's need for assistive devices and provide as appropriate  - Encourage maximum independence but intervene and supervise when necessary  - Involve family in performance of ADLs  - Assess for home care needs following discharge   - Consider OT consult to assist with ADL evaluation and planning for discharge  - Provide patient education as appropriate  Outcome: Progressing  Goal: Maintains/Returns to pre admission functional level  Description: INTERVENTIONS:  - Perform AM-PAC 6 Click Basic Mobility/ Daily Activity assessment daily.  - Set and communicate daily mobility goal to care team and patient/family/caregiver.   - Collaborate with rehabilitation services on mobility goals if consulted  - Stand patient 3 times a day  - Ambulate patient 3 times a day  - Out of bed to chair 3 times a day   - Out of bed for meals 3 times a day  - Out of bed for toileting  - Record patient progress and toleration of activity level   Outcome: Progressing     Problem: DISCHARGE PLANNING  Goal: Discharge to home or other facility with appropriate resources  Description: INTERVENTIONS:  - Identify barriers to discharge w/patient and caregiver  - Arrange for needed discharge resources and transportation as appropriate  - Identify discharge learning needs (meds, wound care, etc.)  - Arrange for interpretive services to assist at discharge as needed  - Refer to Case Management Department for coordinating discharge planning if the  patient needs post-hospital services based on physician/advanced practitioner order or complex needs related to functional status, cognitive ability, or social support system  Outcome: Progressing     Problem: Knowledge Deficit  Goal: Patient/family/caregiver demonstrates understanding of disease process, treatment plan, medications, and discharge instructions  Description: Complete learning assessment and assess knowledge base.  Interventions:  - Provide teaching at level of understanding  - Provide teaching via preferred learning methods  Outcome: Progressing     Problem: CARDIOVASCULAR - ADULT  Goal: Maintains optimal cardiac output and hemodynamic stability  Description: INTERVENTIONS:  - Monitor I/O, vital signs and rhythm  - Monitor for S/S and trends of decreased cardiac output  - Administer and titrate ordered vasoactive medications to optimize hemodynamic stability  - Assess quality of pulses, skin color and temperature  - Assess for signs of decreased coronary artery perfusion  - Instruct patient to report change in severity of symptoms  Outcome: Progressing     Problem: METABOLIC, FLUID AND ELECTROLYTES - ADULT  Goal: Electrolytes maintained within normal limits  Description: INTERVENTIONS:  - Monitor labs and assess patient for signs and symptoms of electrolyte imbalances  - Administer electrolyte replacement as ordered  - Monitor response to electrolyte replacements, including repeat lab results as appropriate  - Instruct patient on fluid and nutrition as appropriate  Outcome: Progressing  Goal: Fluid balance maintained  Description: INTERVENTIONS:  - Monitor labs   - Monitor I/O and WT  - Instruct patient on fluid and nutrition as appropriate  - Assess for signs & symptoms of volume excess or deficit  Outcome: Progressing  Goal: Glucose maintained within target range  Description: INTERVENTIONS:  - Monitor Blood Glucose as ordered  - Assess for signs and symptoms of hyperglycemia and hypoglycemia  -  Administer ordered medications to maintain glucose within target range  - Assess nutritional intake and initiate nutrition service referral as needed  Outcome: Progressing     Problem: HEMATOLOGIC - ADULT  Goal: Maintains hematologic stability  Description: INTERVENTIONS  - Assess for signs and symptoms of bleeding or hemorrhage  - Monitor labs  - Administer supportive blood products/factors as ordered and appropriate  Outcome: Progressing     Problem: MUSCULOSKELETAL - ADULT  Goal: Maintain or return mobility to safest level of function  Description: INTERVENTIONS:  - Assess patient's ability to carry out ADLs; assess patient's baseline for ADL function and identify physical deficits which impact ability to perform ADLs (bathing, care of mouth/teeth, toileting, grooming, dressing, etc.)  - Assess/evaluate cause of self-care deficits   - Assess range of motion  - Assess patient's mobility  - Assess patient's need for assistive devices and provide as appropriate  - Encourage maximum independence but intervene and supervise when necessary  - Involve family in performance of ADLs  - Assess for home care needs following discharge   - Consider OT consult to assist with ADL evaluation and planning for discharge  - Provide patient education as appropriate  Outcome: Progressing  Goal: Maintain proper alignment of affected body part  Description: INTERVENTIONS:  - Support, maintain and protect limb and body alignment  - Provide patient/ family with appropriate education  Outcome: Progressing     Problem: Prexisting or High Potential for Compromised Skin Integrity  Goal: Skin integrity is maintained or improved  Description: INTERVENTIONS:  - Identify patients at risk for skin breakdown  - Assess and monitor skin integrity  - Assess and monitor nutrition and hydration status  - Monitor labs   - Assess for incontinence   - Turn and reposition patient  - Assist with mobility/ambulation  - Relieve pressure over bony  prominences  - Avoid friction and shearing  - Provide appropriate hygiene as needed including keeping skin clean and dry  - Evaluate need for skin moisturizer/barrier cream  - Collaborate with interdisciplinary team   - Patient/family teaching  - Consider wound care consult   Outcome: Progressing     Problem: Nutrition/Hydration-ADULT  Goal: Nutrient/Hydration intake appropriate for improving, restoring or maintaining nutritional needs  Description: Monitor and assess patient's nutrition/hydration status for malnutrition. Collaborate with interdisciplinary team and initiate plan and interventions as ordered.  Monitor patient's weight and dietary intake as ordered or per policy. Utilize nutrition screening tool and intervene as necessary. Determine patient's food preferences and provide high-protein, high-caloric foods as appropriate.     INTERVENTIONS:  - Monitor oral intake, urinary output, labs, and treatment plans  - Assess nutrition and hydration status and recommend course of action  - Evaluate amount of meals eaten  - Assist patient with eating if necessary   - Allow adequate time for meals  - Recommend/ encourage appropriate diets, oral nutritional supplements, and vitamin/mineral supplements  - Order, calculate, and assess calorie counts as needed  - Recommend, monitor, and adjust tube feedings and TPN/PPN based on assessed needs  - Assess need for intravenous fluids  - Provide specific nutrition/hydration education as appropriate  - Include patient/family/caregiver in decisions related to nutrition  Outcome: Progressing     Problem: NEUROSENSORY - ADULT  Goal: Achieves maximal functionality and self care  Description: INTERVENTIONS  - Monitor swallowing and airway patency with patient fatigue and changes in neurological status  - Encourage and assist patient to increase activity and self care.   - Encourage visually impaired, hearing impaired and aphasic patients to use assistive/communication  devices  Outcome: Progressing

## 2024-08-04 NOTE — PLAN OF CARE
Problem: INFECTION - ADULT  Goal: Absence or prevention of progression during hospitalization  Description: INTERVENTIONS:  - Assess and monitor for signs and symptoms of infection  - Monitor lab/diagnostic results  - Monitor all insertion sites, i.e. indwelling lines, tubes, and drains  - Monitor endotracheal if appropriate and nasal secretions for changes in amount and color  - [place] appropriate cooling/warming therapies per order  - Administer medications as ordered  - Instruct and encourage patient and family to use good hand hygiene technique  - Identify and instruct in appropriate isolation precautions for identified infection/condition  Outcome: Progressing

## 2024-08-04 NOTE — ASSESSMENT & PLAN NOTE
Wt Readings from Last 3 Encounters:   08/04/24 63.4 kg (139 lb 12.4 oz)   07/19/24 67.2 kg (148 lb 2.4 oz)   07/15/24 65.3 kg (144 lb)     Followed by cardiology, continue diuretics  AV node blockers on hold secondary to bradycardia  Remains 96% SpO2 on room air, not in acute exacerbation

## 2024-08-04 NOTE — QUICK NOTE
Nurse reports black liquid stool mixed with urine    Admitted for gangrene and aortoiliac occlusive disease. On daily ASA and Brilinta   Noted with acute blood loss anemia during hospitalization. Hg downtrending, 6.4 on 7/30 s/p PRBC transfusions  On DVT ppx with subq heparin    VSS  Stool occult and hemoglobin ordered  Hg stable: 10.0  Will order bowel rest: NPO, IV PPI b.i.d.  Stop SQ heparin for now. Continue DAPT given primary diagnosis. Will d/w attending in a.m  Monitor Hg  Transfuse p.r.n.  GI consult     Addendum.  Repeat a.m. hemoglobin stable at 10.1

## 2024-08-04 NOTE — ASSESSMENT & PLAN NOTE
Patient noted to have anemia on 7/30 labs, likely secondary to recent surgical procedures  Has received 2 units PRBCs  Given other comorbidities secondary to acute blood loss anemia, will attempt to keep hemoglobin levels above 8  Currently hemoglobin 10.1; monitor on daily labs

## 2024-08-04 NOTE — PROGRESS NOTES
Progress Note- Anamaria Parnell 79 y.o. female MRN: [id number]    Unit/Bed#: [place] [address] -01 Encounter: [id number]      Assessment and Plan:  79-year-old female with history of PAD, basilar artery stenosis, stroke, hypertension, CAD, atrial fibrillation, CKD and type 2 diabetes mellitus who initially presented on 7/16 due to right toe pain and discoloration found to have lower extremity pain occurring in setting of peripheral arterial disease now status post bilateral femoral endarterectomy with stenting (7/24) along with right hallux amputation (7/29) with possible course complicated by elevated liver enzymes initially leading to GI consult now with recent onset of melena.  Patient initially with ischemic hepatopathy in the setting of hypotension due to acute blood loss and likely procedure now with evidence of ischemic colitis.    1.  Melena  2.  Acute blood loss anemia  3.  Elevated liver enzymes  4.  Right foot gangrene  5.  History of PAD  - Hemoglobin stable since procedure around 10 with previous baseline of 13-15  - Trend hemoglobin and monitor stool output  - Maintain 2 large-bore IVs, active type and screen  - Transfuse for hemoglobin less than 8 given history of CAD  - No current plans for endoscopic evaluation while inpatient  - Maintain normotension  - No need for PPI in the setting of GI bleed  - Okay to continue antiplatelet agents  - Continue to trend liver enzymes with satisfactory improvement over the past few days  - Hepatitis serologies nonreactive    ______________________________________________________________________    Subjective:  Patient feeling well overall without nausea, vomiting, abdominal pain.  Ready to get out of the hospital.  Unsure when her melena started.  No bowel movement today.    Medication Administration - last 24 hours from 08/03/2024 0836 to 08/04/2024 0836         Date/Time Order Dose Route Action Action by     08/04/2024 0750 EDT allopurinol (ZYLOPRIM) tablet 100 mg  100 mg Oral Given Dennys Salinas, ZULEYMA     08/03/2024 0908 EDT allopurinol (ZYLOPRIM) tablet 100 mg 100 mg Oral Given Keara Traore, ZULEYMA     08/04/2024 0749 EDT aspirin chewable tablet 81 mg 81 mg Oral Given Dennys Salinas, ZULEYMA     08/03/2024 0908 EDT aspirin chewable tablet 81 mg 81 mg Oral Given Keara Traore, ZULEYMA     08/04/2024 0750 EDT escitalopram (LEXAPRO) tablet 20 mg 20 mg Oral Given Dennys Salinas, ZULEYMA     08/03/2024 0908 EDT escitalopram (LEXAPRO) tablet 20 mg 20 mg Oral Given Keara Traore, ZULEYMA     08/04/2024 0749 EDT lidocaine (LIDODERM) 5 % patch 1 patch 1 patch Topical Not Given Dennys Salinas, ZULEYMA     08/03/2024 2143 EDT lidocaine (LIDODERM) 5 % patch 1 patch 1 patch Topical Patch Removed Malou Ernst RN     08/03/2024 0908 EDT lidocaine (LIDODERM) 5 % patch 1 patch 1 patch Topical Medication Applied Keara Traore RN     08/04/2024 0750 EDT ticagrelor (BRILINTA) tablet 90 mg 90 mg Oral Given Dennys Salinas, ZULEYMA     08/03/2024 2143 EDT ticagrelor (BRILINTA) tablet 90 mg 90 mg Oral Given Malou Ernst RN     08/03/2024 0908 EDT ticagrelor (BRILINTA) tablet 90 mg 90 mg Oral Given Keara Traore RN     08/03/2024 2314 EDT oxyCODONE (ROXICODONE) IR tablet 5 mg 5 mg Oral Given Malou Ernst RN     08/04/2024 0742 EDT insulin lispro (HumALOG/ADMELOG) 100 units/mL subcutaneous injection 1-5 Units -- Subcutaneous Not Given Dennys Salinas RN     08/03/2024 1815 EDT insulin lispro (HumALOG/ADMELOG) 100 units/mL subcutaneous injection 1-5 Units 3 Units Subcutaneous Given Keara Traore RN     08/03/2024 1309 EDT insulin lispro (HumALOG/ADMELOG) 100 units/mL subcutaneous injection 1-5 Units 1 Units Subcutaneous Given Keara Traore RN     08/03/2024 0908 EDT insulin lispro (HumALOG/ADMELOG) 100 units/mL subcutaneous injection 1-5 Units 1 Units Subcutaneous Given Keara Traore RN     08/04/2024 0750 EDT sodium bicarbonate tablet 650 mg 650 mg Oral Given Dnenys Salinas RN     08/03/2024 1814 EDT sodium bicarbonate tablet 650 mg 650 mg Oral  Given Keara Traore, ZULEYMA     08/03/2024 0908 EDT sodium bicarbonate tablet 650 mg 650 mg Oral Given Keara Traore, ZULEYMA     08/04/2024 0750 EDT torsemide (DEMADEX) tablet 10 mg 10 mg Oral Given Dennys Salinas, ZULEYMA     08/03/2024 0908 EDT torsemide (DEMADEX) tablet 10 mg 10 mg Oral Given Keara Traore, ZULEYMA     08/03/2024 0918 EDT lactulose (CHRONULAC) oral solution 20 g 20 g Oral Not Given Keara Traore, ZULEYMA     08/04/2024 0751 EDT insulin detemir (LEVEMIR) subcutaneous injection 10 Units 10 Units Subcutaneous Given Dennys Salinas, ZULEYMA     08/03/2024 0908 EDT insulin detemir (LEVEMIR) subcutaneous injection 10 Units 10 Units Subcutaneous Given Keara Traore RN     08/03/2024 0909 EDT insulin lispro (HumALOG/ADMELOG) 100 units/mL subcutaneous injection 3 Units 3 Units Subcutaneous Given Keara Traore RN     08/04/2024 0749 EDT metoprolol succinate (TOPROL-XL) 24 hr tablet 25 mg 25 mg Oral Given Dennys Salinas, ZULEYMA     08/03/2024 0908 EDT metoprolol succinate (TOPROL-XL) 24 hr tablet 25 mg 25 mg Oral Given Keara Traore RN     08/03/2024 2304 EDT cefTRIAXone (ROCEPHIN) 1,000 mg in dextrose 5 % 50 mL IVPB 1,000 mg Intravenous New Bag Malou Ernst, ZULEYMA     08/03/2024 2143 EDT heparin (porcine) subcutaneous injection 5,000 Units 5,000 Units Subcutaneous Given Malou Ernst, ZULEYMA     08/03/2024 1309 EDT heparin (porcine) subcutaneous injection 5,000 Units 5,000 Units Subcutaneous Given Keara Traore RN     08/04/2024 0742 EDT insulin lispro (HumALOG/ADMELOG) 100 units/mL subcutaneous injection 5 Units 5 Units Subcutaneous Not Given Dennys Salinas, ZULEYMA     08/03/2024 1814 EDT insulin lispro (HumALOG/ADMELOG) 100 units/mL subcutaneous injection 5 Units 5 Units Subcutaneous Given Keara Traore RN     08/03/2024 1309 EDT insulin lispro (HumALOG/ADMELOG) 100 units/mL subcutaneous injection 5 Units 5 Units Subcutaneous Given Keara Traore RN     08/04/2024 0749 EDT pantoprazole (PROTONIX) injection 40 mg 40 mg Intravenous Given Dennys Salinas RN      "08/03/2024 2304 EDT pantoprazole (PROTONIX) injection 40 mg 40 mg Intravenous Given Malou Ernst RN            Objective:     Vitals: Blood pressure 156/78, pulse 97, temperature 98 °F (36.7 °C), resp. rate 17, height 4' 10\" (1.473 m), weight 63.4 kg (139 lb 12.4 oz), SpO2 95%.,Body mass index is 29.21 kg/m².      Intake/Output Summary (Last 24 hours) at 8/4/2024 0836  Last data filed at 8/4/2024 0059  Gross per 24 hour   Intake 280 ml   Output --   Net 280 ml       Physical Exam:   General Appearance: Awake and alert, in no acute distress, wrapping on right foot  Abdomen: Soft, non-tender, non-distended; bowel sounds normal; no masses or no organomegaly    Invasive Devices       Peripheral Intravenous Line  Duration             Peripheral IV 07/31/24 Right Antecubital 4 days    Peripheral IV 08/04/24 Left;Upper;Ventral (anterior) Arm <1 day                    Lab Results:  No results displayed because visit has over 200 results.          Imaging Studies: I have personally reviewed pertinent imaging studies.      "

## 2024-08-04 NOTE — ASSESSMENT & PLAN NOTE
Lab Results   Component Value Date    HGBA1C 5.9 (H) 07/09/2024     Recent Labs     08/03/24  1619 08/03/24  2114 08/04/24  0713 08/04/24  1203   POCGLU 304* 168* 168* 188*       Home regimen: Tradjenta  Inpatient regimen: Sliding scale    7/31-update: Patient's glucose levels not at goal, may represent physiological reaction to ongoing treatment plan.  Have added 10 units long-acting insulin daily, monitor and uptitrate/add prandial insulin as needed    8/1-update: Blood glucose level still above goal, will initiate 3 units prandial insulin 3 times daily with meals    8/3 -update: Uptitrate prandial insulin to 5 units 3 times daily with meals

## 2024-08-04 NOTE — PROGRESS NOTES
"Progress Note - Vascular Surgery   Anamaria Parnell 79 y.o. female MRN: [id number]  Unit/Bed#: [place] [address] -01 Encounter: [id number]    Assessment:  79-year-old female with AIOD/PAD, right lower extremity CLTI.  Status post:  - 7/24 Bilateral femoral endarterectomy, left to right femorofemoral bypass w/ PTFE, retrograde left iliac stenting, bilateral groin VAC placement.  - 7/29 right hallux amputation  Improving AMS, persistent leukocytosis     Plan:  -diet as tolerated  -cont wound vac changes MWF  -planned 8/6 R fem-BK bypass  -monitor WBC count  -ID following for abx Cards, nephro, GI, Geriatrics  -appreciate  -Cont ASA/brillinta  -would recommend resuming statin when able  -analgesia and anti-emetics  -dvt ppx  -oob and ambulation TID  -encourage IS use  -PT/OT to eval and treat  -rest of care per primary    Subjective/Objective     Subjective: no acute events overnight.  Reportedly had darker stools mixed with urine overnight. No N/V or abd discomfort.     Objective: AVSS on RA    Blood pressure 126/65, pulse 88, temperature 98 °F (36.7 °C), resp. rate 16, height 4' 10\" (1.473 m), weight 63.4 kg (139 lb 12.4 oz), SpO2 96%.,Body mass index is 29.21 kg/m².    UOP: 615 cc + UM  VAC: 200 cc serous sang      Invasive Devices       Peripheral Intravenous Line  Duration             Peripheral IV 07/31/24 Right Antecubital 4 days    Peripheral IV 08/04/24 Left;Upper;Ventral (anterior) Arm <1 day                    Physical Exam:  General: No acute distress, alert and oriented  CV: RRR  Lungs: Normal work of breathing   Abdomen: s/nt/nd, VAC intact with good seal and suction.   Extremities: Bilateral DP PT signals  Skin: Warm, dry    Lab, Imaging and other studies:  Recent Labs     08/02/24  0520 08/03/24  2209 08/04/24  0434   WBC 25.63*  --  16.63*   HGB 10.9* 10.0* 10.1*     --  283   SODIUM 141  --  141   K 3.5  --  2.9*     --  99   CO2 25  --  32   BUN 69*  --  73*   CREATININE 1.88*  --  1.52* "   GLUC 193*  --  135   CALCIUM 8.4  --  8.4   MG 2.1  --  1.8*   PHOS 3.9  --  3.3   *  --  52*   *  --  103*   ALKPHOS 143*  --  120*   TBILI 1.19*  --  0.96     VTE Pharmacologic Prophylaxis: Heparin  VTE Mechanical Prophylaxis: sequential compression device

## 2024-08-04 NOTE — ASSESSMENT & PLAN NOTE
"Converted spontaneously before she was placed on telemetry. ECG reviewed with cardiology.  Per cardiology: \"Given her anemia, DAPT for recent surgery, recent surgery and low A-fib burden, will hold off starting anticoagulation for now.\"     7/29-update: Patient now with tachybradycardia syndrome; cardiology recommending transfer to [facility] sometime following Friday's vascular surgery procedure for possible implantation of pacemaker.  "

## 2024-08-04 NOTE — PROGRESS NOTES
Vascular surgery note -    78 y/o lady with CKD 4, DM2, HTN, HLD, h/o stroke, chornic PAD, atherosclerosis of native arteries of right lower extremity with right foot gangrene and cellulitis.  Underwent extensive right leg revasc procedure with bilateral femoral endarterectomy and left to right fem fem bypass, left common iliac VBX.  Significant intra-op hypotension and bleeding leading of postop rise in LFTs and RICARDO.  Right foot toe amp for source control.     Right foot pain has resolved.  I had a detailed conversation with patient and son today at bedside.  She appears very weak and frail.  She does not want additional fem pop surgery at this time.  Based on the profound shock liver and RICARDO she developed after first surgery, I agree that additional fem pop bypass surgery should be delayed for a few weeks to allow her to recover and her nutritional status can be improved.    She has bilateral groin wounds that have been primary VAC treatment.    I have discussed with podiatry and SLIM.    Plan to discharge to nursing facility with open toe amp and b/l groin VACs. Follow up with vascular in 2 weeks to determine next steps of right fem pop bypass.

## 2024-08-04 NOTE — CONSULTS
Please see progress note from today authored by myself and original consult note by Dr. Woods on 7/31.

## 2024-08-05 LAB
GLUCOSE SERPL-MCNC: 164 MG/DL (ref 65–140)
GLUCOSE SERPL-MCNC: 185 MG/DL (ref 65–140)
GLUCOSE SERPL-MCNC: 191 MG/DL (ref 65–140)
GLUCOSE SERPL-MCNC: 204 MG/DL (ref 65–140)

## 2024-08-05 PROCEDURE — NC001 PR NO CHARGE: Performed by: PODIATRIST

## 2024-08-05 PROCEDURE — 82948 REAGENT STRIP/BLOOD GLUCOSE: CPT

## 2024-08-05 PROCEDURE — 99024 POSTOP FOLLOW-UP VISIT: CPT | Performed by: SURGERY

## 2024-08-05 PROCEDURE — 99232 SBSQ HOSP IP/OBS MODERATE 35: CPT | Performed by: INTERNAL MEDICINE

## 2024-08-05 PROCEDURE — 99232 SBSQ HOSP IP/OBS MODERATE 35: CPT

## 2024-08-05 PROCEDURE — 97605 NEG PRS WND THER DME<=50SQCM: CPT | Performed by: NURSE PRACTITIONER

## 2024-08-05 PROCEDURE — 99233 SBSQ HOSP IP/OBS HIGH 50: CPT

## 2024-08-05 PROCEDURE — 99233 SBSQ HOSP IP/OBS HIGH 50: CPT | Performed by: INTERNAL MEDICINE

## 2024-08-05 RX ORDER — PANTOPRAZOLE SODIUM 40 MG/1
40 TABLET, DELAYED RELEASE ORAL 2 TIMES DAILY
Status: DISCONTINUED | OUTPATIENT
Start: 2024-08-05 | End: 2024-08-07

## 2024-08-05 RX ADMIN — INSULIN LISPRO 1 UNITS: 100 INJECTION, SOLUTION INTRAVENOUS; SUBCUTANEOUS at 11:58

## 2024-08-05 RX ADMIN — INSULIN LISPRO 1 UNITS: 100 INJECTION, SOLUTION INTRAVENOUS; SUBCUTANEOUS at 16:33

## 2024-08-05 RX ADMIN — ASPIRIN 81 MG CHEWABLE TABLET 81 MG: 81 TABLET CHEWABLE at 09:01

## 2024-08-05 RX ADMIN — SODIUM BICARBONATE 650 MG TABLET 650 MG: at 09:01

## 2024-08-05 RX ADMIN — HEPARIN SODIUM 5000 UNITS: 5000 INJECTION INTRAVENOUS; SUBCUTANEOUS at 07:08

## 2024-08-05 RX ADMIN — METOPROLOL SUCCINATE 25 MG: 25 TABLET, EXTENDED RELEASE ORAL at 09:01

## 2024-08-05 RX ADMIN — INSULIN LISPRO 1 UNITS: 100 INJECTION, SOLUTION INTRAVENOUS; SUBCUTANEOUS at 09:05

## 2024-08-05 RX ADMIN — TICAGRELOR 90 MG: 90 TABLET ORAL at 22:24

## 2024-08-05 RX ADMIN — PANTOPRAZOLE SODIUM 40 MG: 40 TABLET, DELAYED RELEASE ORAL at 22:24

## 2024-08-05 RX ADMIN — DEXTROSE 1000 MG: 50 INJECTION, SOLUTION INTRAVENOUS at 01:16

## 2024-08-05 RX ADMIN — ALLOPURINOL 100 MG: 100 TABLET ORAL at 09:01

## 2024-08-05 RX ADMIN — INSULIN LISPRO 5 UNITS: 100 INJECTION, SOLUTION INTRAVENOUS; SUBCUTANEOUS at 09:02

## 2024-08-05 RX ADMIN — INSULIN LISPRO 5 UNITS: 100 INJECTION, SOLUTION INTRAVENOUS; SUBCUTANEOUS at 11:59

## 2024-08-05 RX ADMIN — TICAGRELOR 90 MG: 90 TABLET ORAL at 09:01

## 2024-08-05 RX ADMIN — TORSEMIDE 10 MG: 10 TABLET ORAL at 09:01

## 2024-08-05 RX ADMIN — APIXABAN 5 MG: 5 TABLET, FILM COATED ORAL at 16:33

## 2024-08-05 RX ADMIN — INSULIN LISPRO 5 UNITS: 100 INJECTION, SOLUTION INTRAVENOUS; SUBCUTANEOUS at 16:34

## 2024-08-05 RX ADMIN — SODIUM BICARBONATE 650 MG TABLET 650 MG: at 16:33

## 2024-08-05 RX ADMIN — INSULIN DETEMIR 10 UNITS: 100 INJECTION, SOLUTION SUBCUTANEOUS at 09:01

## 2024-08-05 RX ADMIN — ESCITALOPRAM OXALATE 20 MG: 20 TABLET ORAL at 09:01

## 2024-08-05 RX ADMIN — PANTOPRAZOLE SODIUM 40 MG: 40 INJECTION, POWDER, FOR SOLUTION INTRAVENOUS at 09:00

## 2024-08-05 NOTE — PROGRESS NOTES
"Podiatry - Progress Note  Patient: Anamaria Parnell 79 y.o. female   MRN: [id number]  PCP: Ritchie Lawton MD  Unit/Bed#: [address]-01 Encounter: [id number]  Date Of Visit: 24    ASSESSMENT:    Anamaria Parnell is a 79 y.o. female with:    Right hallux gangrene, Davidson grade 4  - s/p right partial first ray amputation (DOS: 24)  T2DM (HbA1c - 5.9%)  CKD Stage 4  Aortoiliac occlusive disease  PAD      PLAN:    POD 7 status post right partial first ray amputation, packed open.  Surgical site stable today, packing change.  Discussed with vascular surgery team plan regarding the right lower extremity.  Given that patient has right foot pain that is resolved, appreciated source control, and had previously developed profound liver shock/RICARDO after prior surgery bypass will be delayed for several weeks.  Podiatry team will plan for local wound care consisting of Betadine DSD to the right lower extremity and definitive amputation following bypass.  Antibiotics per ID  Continue local wound care, appreciate nursing assistance with dressing changes.  Elevation and offloading on green foam wedges or pillows when non-ambulatory.  Rest of care per primary team.     Weightbearing status: Non-weightbearing right foot    SUBJECTIVE:     The patient was seen, evaluated, and assessed at bedside today. The patient was awake, alert, and in no acute distress. No acute events overnight. The patient reports no pain to the right lower extremity.. Patient denies N/V/F/chills/SOB/CP.      OBJECTIVE:     Vitals:   /76 (BP Location: Left arm)   Pulse 96   Temp (!) 97.3 °F (36.3 °C) (Oral)   Resp 18   Ht 4' 10\" (1.473 m)   Wt 64.2 kg (141 lb 8.6 oz)   LMP  (LMP Unknown)   SpO2 95%   BMI 29.58 kg/m²     Temp (24hrs), Av.3 °F (36.3 °C), Min:97.3 °F (36.3 °C), Max:97.3 °F (36.3 °C)      Physical Exam:     Lungs: Non labored breathing  Abdomen: Soft, non-tender.  Lower Extremity:  Cardiovascular status at baseline " "from admission.  Neurological status at baseline from admission.  Musculoskeletal status at baseline from admission. No calf tenderness noted.     Lower extremity wound(s) as noted below:  Wound #: 1  Location: Right partial first ray amputation  Length 5 cm: Width 3 cm: Depth 2 cm:   Deepest Tissue Noted in Base: Surgical bone  Probe to Bone: Yes - surgical   Peripheral Skin Description: Attached  Granulation: 40% Fibrotic Tissue: 60% Necrotic Tissue: 0%   Drainage Amount: minimal, serosanguinous  Signs of Infection: No    Clinical Images 08/05/24:            Additional Data:     Labs:    Results from last 7 days   Lab Units 08/04/24  0434   WBC Thousand/uL 16.63*   HEMOGLOBIN g/dL 10.1*   HEMATOCRIT % 31.0*   PLATELETS Thousands/uL 283   SEGS PCT % 82*   LYMPHO PCT % 6*   MONO PCT % 9   EOS PCT % 0     Results from last 7 days   Lab Units 08/04/24 2012 08/04/24  0434   POTASSIUM mmol/L 4.4 2.9*   CHLORIDE mmol/L  --  99   CO2 mmol/L  --  32   BUN mg/dL  --  73*   CREATININE mg/dL  --  1.52*   CALCIUM mg/dL  --  8.4   ALK PHOS U/L  --  120*   ALT U/L  --  103*   AST U/L  --  52*     Results from last 7 days   Lab Units 07/31/24  1111   INR  1.70*       * I Have Reviewed All Lab Data Listed Above.    Recent Cultures (last 7 days):     Results from last 7 days   Lab Units 07/29/24  1805   GRAM STAIN RESULT  Rare Polys*  Rare Gram negative rods*     Results from last 7 days   Lab Units 07/29/24  1805   ANAEROBIC CULTURE  No anaerobes isolated       Imaging: I have personally reviewed pertinent films in PACS  EKG, Pathology, and Other Studies: I have personally reviewed pertinent reports.    ** Please Note: Portions of the record may have been created with voice recognition software. Occasional wrong word or \"sound a like\" substitutions may have occurred due to the inherent limitations of voice recognition software. Read the chart carefully and recognize, using context, where substitutions have occurred. **      "

## 2024-08-05 NOTE — PROGRESS NOTES
[facility]  Progress Note  Name: Anamaria Parnell I  MRN: [id number]  Unit/Bed#: [place] [address] -01 I Date of Admission: 7/19/2024   Date of Service: 8/5/2024 I Hospital Day: 17    Assessment & Plan   * Aortoiliac occlusive disease (HCC)  Assessment & Plan  History of PAD CAD diabetes and hypertension presented to [facility] for right great toe gangrene transferred here for vascular bypass.  This admission: Status post bilateral femoral endarterectomy with bypass and JACI/EIA stenting  VAC still present.  Continue aspirin and Brilinta.  Gangrene of toe/cellulitis.  Status post first ray amputation.  Ceftriaxone end date today.  Disposition: To rehab    Acute blood loss anemia  Assessment & Plan  Acute blood loss anemia from procedures.  Has received 5 units of PRBC since admission.  Hemoglobin stable now    Results from last 7 days   Lab Units 08/04/24  0434 08/03/24  2209 08/02/24  0520 08/01/24  0440   HEMOGLOBIN g/dL 10.1* 10.0* 10.9* 10.5*       Acute liver failure without hepatic coma  Assessment & Plan  Shock liver may be related to anemia/hypotension.  Resolving.    Results from last 7 days   Lab Units 08/04/24  0434 08/02/24  0520 08/01/24  0440 07/31/24  0502 07/30/24  1808 07/30/24  0449   AST U/L 52* 362* 814* 2,280* 3,936* 411*   ALT U/L 103* 246* 330* 516* 724* 126*   TOTAL BILIRUBIN mg/dL 0.96 1.19* 1.32* 0.89 0.75 0.67       Encephalopathy  Assessment & Plan  Encephalopathy multifactorial due to prolonged hospitalization/delirium.  Mentation improving    Chronic HFrEF (heart failure with reduced ejection fraction) (HCC)  Assessment & Plan  Compensated.  Continue torsemide    Paroxysmal atrial fibrillation (HCC)  Assessment & Plan  New onset atrial fibrillation this admission multifactorial from surgery/anemia.  Did have bradycardia thought secondary to high-dose metoprolol.  No further episodes.  No need for pacemaker.  Anticoagulation: Cardiology deciding on  eliquis    Coronary artery disease of native artery of native heart with stable angina pectoris (HCC)  Assessment & Plan  CAD with history of PCI.  No chest pain.  On DAPT  Cardiology was continue aspirin and brilinta.    Primary hypertension  Assessment & Plan  Blood pressures are stable.  Off amlodipine and clonidine.  Labetalol changed to metoprolol by cardiology    Type 2 diabetes mellitus, without long-term current use of insulin ([facility])  Assessment & Plan  Lab Results   Component Value Date    HGBA1C 5.9 (H) 07/09/2024     Recent Labs     08/04/24  1603 08/04/24  2117 08/05/24  0716 08/05/24  1054   POCGLU 269* 243* 204* 191*     Stable continue levemir 10 units and lispro 5 units 3 times daily.    Depression, recurrent ([facility])  Assessment & Plan  Mood stable continue escitalopram    Acute renal failure (ARF) ([facility])  Assessment & Plan  Acute kidney injury creatinine 2.37 secondary to sepsis.    Resolved.    Results from last 7 days   Lab Units 08/04/24  0434 08/02/24  0520 08/01/24  0440 07/31/24  0502 07/30/24  1808 07/30/24  0449   BUN mg/dL 73* 69* 77* 77* 70* 56*   CREATININE mg/dL 1.52* 1.88* 2.19* 2.37* 2.17* 1.80*   EGFR ml/min/1.73sq m 32 25 21 19 21 26       VTE Pharmacologic Prophylaxis: VTE Score: 3 Moderate Risk (Score 3-4) - Pharmacological DVT Prophylaxis Ordered: heparin.    Mobility:  Basic Mobility Inpatient Raw Score: 8  -HL Goal: 3: Sit at edge of bed  -HLM Achieved: 3: Sit at edge of bed  -HLM Goal achieved. Continue to encourage appropriate mobility.    Patient Centered Rounds: I have performed bedside rounds with nursing staff today.  Discussions with Specialists or Other Care Team Provider: Case management nephrology infectious disease    Education and Discussions with Family / Patient: Updated  (son) via phone.    Time Spent for Care:   This time was spent on one or more of the following: performing physical exam; counseling and coordination of care; obtaining or  "reviewing history; documenting in the medical record; reviewing/ordering tests, medications or procedures; communicating with other healthcare professionals and discussing with patient's family/caregivers.    Current Length of Stay: 17 day(s)  Current Patient Status: Inpatient   Certification Statement: The patient will continue to require additional inpatient hospital stay due to IV antibiotics and will need rehab placement  Discharge Plan: Anticipate discharge in 24-48 hrs to rehab facility.    Code Status: Level 3 - DNAR and DNI      Subjective:   Patient seen and examined.  No new complaints.    Objective:   Vitals: Blood pressure 157/76, pulse 96, temperature (!) 97.3 °F (36.3 °C), temperature source Oral, resp. rate 18, height 4' 10\" (1.473 m), weight 64.2 kg (141 lb 8.6 oz), SpO2 95%.    Intake/Output Summary (Last 24 hours) at 8/5/2024 1412  Last data filed at 8/5/2024 0830  Gross per 24 hour   Intake 240 ml   Output 275 ml   Net -35 ml       Physical Exam  Vitals reviewed.   Constitutional:       General: She is not in acute distress.     Appearance: Normal appearance.   HENT:      Head: Atraumatic.   Cardiovascular:      Rate and Rhythm: Regular rhythm.   Pulmonary:      Breath sounds: Decreased breath sounds present. No wheezing.   Abdominal:      General: Bowel sounds are normal.      Palpations: Abdomen is soft.      Tenderness: There is no guarding or rebound.   Musculoskeletal:         General: No swelling.      Comments: Right foot wrapped   Skin:     General: Skin is warm.   Neurological:      Mental Status: She is alert. Mental status is at baseline.   Psychiatric:         Mood and Affect: Mood normal.       Additional Data:   Labs:  Results from last 7 days   Lab Units 08/04/24  0434 08/03/24  2209 08/02/24  0520 08/01/24  0440 07/31/24  1715 07/31/24  1111 07/31/24  0502 07/30/24  2202   WBC Thousand/uL 16.63*  --  25.63* 33.34*  --   --  31.56*  --    HEMOGLOBIN g/dL 10.1* 10.0* 10.9* 10.5*   < " >  --  7.4*  --    PLATELETS Thousands/uL 283  --  272 303  --   --  318  --    MCV fL 97  --  92 95  --   --  99*  --    TOTAL NEUT ABS Thousand/uL  --   --   --   --   --   --  27.46*  --    BANDS PCT %  --   --   --   --   --   --  4  --    INR   --   --   --   --   --  1.70*  --  2.10*    < > = values in this interval not displayed.     Results from last 7 days   Lab Units 08/04/24 2012 08/04/24 0434 08/02/24 0520 08/01/24 0440   SODIUM mmol/L  --  141 141 143   POTASSIUM mmol/L 4.4 2.9* 3.5 4.1   CHLORIDE mmol/L  --  99 104 107   CO2 mmol/L  --  32 25 26   ANION GAP mmol/L  --  10 12 10   BUN mg/dL  --  73* 69* 77*   CREATININE mg/dL  --  1.52* 1.88* 2.19*   CALCIUM mg/dL  --  8.4 8.4 8.6   ALBUMIN g/dL  --  3.0* 3.4* 3.6   TOTAL BILIRUBIN mg/dL  --  0.96 1.19* 1.32*   ALK PHOS U/L  --  120* 143* 139*   ALT U/L  --  103* 246* 330*   AST U/L  --  52* 362* 814*   EGFR ml/min/1.73sq m  --  32 25 21   GLUCOSE RANDOM mg/dL  --  135 193* 231*     Results from last 7 days   Lab Units 08/04/24 0434 08/02/24 0520 08/01/24 0440 07/31/24 0502 07/30/24  0449   MAGNESIUM mg/dL 1.8* 2.1 2.3 2.4 2.1   PHOSPHORUS mg/dL 3.3 3.9 4.3* 5.0* 4.8*                  Results from last 7 days   Lab Units 07/31/24 0502 07/31/24  0121 07/30/24  2202   LACTIC ACID mmol/L 1.9 2.4* 4.6*     Results from last 7 days   Lab Units 08/05/24  1054 08/05/24  0716 08/04/24  2117 08/04/24  1603 08/04/24  1203 08/04/24  0713 08/03/24  2114 08/03/24  1619 08/03/24  1213 08/03/24  0722 08/02/24  2130 08/02/24  1551   POC GLUCOSE mg/dl 191* 204* 243* 269* 188* 168* 168* 304* 209* 182* 217* 225*             * I Have Reviewed All Lab Data Listed Above.    Recent cultures:   Results from last 7 days   Lab Units 07/29/24  1805   GRAM STAIN RESULT  Rare Polys*  Rare Gram negative rods*           Results from last 7 days   Lab Units 08/03/24  2236   FECAL OCCULT BLOOD DIAGNOSTIC  Positive*   FECAL OCCULT BLOOD DIAGNOSTIC 2  Positive*        Lines/Drains:  Invasive Devices       Peripheral Intravenous Line  Duration             Peripheral IV 07/31/24 Right Antecubital 5 days    Peripheral IV 08/04/24 Left;Upper;Ventral (anterior) Arm 1 day                      Telemetry:  Telemetry Orders (From admission, onward)               24 Hour Telemetry Monitoring  Continuous x 24 Hours (Telem)        Question:  Reason for 24 Hour Telemetry  Answer:  Arrhythmias requiring acute medical intervention / PPM or ICD malfunction                     Telemetry Reviewed:   Indication for Continued Telemetry Use:              Telemetry:   Telemetry Orders (From admission, onward)               24 Hour Telemetry Monitoring  Continuous x 24 Hours (Telem)        Question:  Reason for 24 Hour Telemetry  Answer:  Arrhythmias requiring acute medical intervention / PPM or ICD malfunction                      Imaging:  Imaging Reports Reviewed Today Include:   XR chest portable ICU    Result Date: 7/24/2024  Impression: ET tube 4 cm above the roberta. Minimal atelectasis in the right midlung. Workstation performed: [id number]     XR chest portable    Result Date: 7/23/2024  Impression: Interval development of pulmonary congestion. Small bilateral effusions suspected. Workstation performed: [id number]     XR foot 3+ vw right    Result Date: 7/22/2024  Impression: Cortical destruction along the ventral aspect of the first distal phalanx worrisome for acute osteomyelitis. Large air within the overlying soft tissues. The study was marked in EPIC for immediate notification. Workstation performed: [id number]       Scheduled Meds:  Current Facility-Administered Medications   Medication Dose Route Frequency Provider Last Rate    allopurinol  100 mg Oral Daily Costa Omer DPM      aspirin  81 mg Oral Daily Costa Omer DPM      cefTRIAXone  1,000 mg Intravenous Q24H Amaya Aldea, DO 1,000 mg (08/05/24 0116)    escitalopram  20 mg Oral Daily Costa Omer  DPM      heparin (porcine)  5,000 Units Subcutaneous Q8H [facility] Cullen Reinoso MD      HYDROmorphone  0.2 mg Intravenous Q6H PRN Costa Omer DPM      insulin detemir  10 Units Subcutaneous Daily Cullen Reinoso MD      insulin lispro  1-5 Units Subcutaneous TID AC Costa Omer, KARMA      insulin lispro  5 Units Subcutaneous TID With Meals Cullen Reinoso MD      lidocaine  1 patch Topical Daily Costa Omer DPM      metoprolol succinate  25 mg Oral Daily Timothy Rey DO      ondansetron  4 mg Intravenous Q6H PRN Costa Omer DPM      oxyCODONE  5 mg Oral Q6H PRN Costa Omer DPM      pantoprazole  40 mg Oral BID GRACIELA Ward      sodium bicarbonate  650 mg Oral BID after meals Costa Omer, KARMA      ticagrelor  90 mg Oral Q12H [facility] Costa Omer, APRILM      torsemide  10 mg Oral Daily Costa Omer DPM         Today, Patient Was Seen By: Akira Flores DO    ** Please Note: Dictation voice to text software may have been used in the creation of this document. **

## 2024-08-05 NOTE — PROGRESS NOTES
Progress Note - Infectious Disease   Anamaria Parnell 79 y.o. female MRN: [id number]  Unit/Bed#: [place] [address] -01 Encounter: [id number]    Impression/Plan:  1. Leukocytosis. Likely reactive in setting of critical limb ischemia. Also consider role of R foot infection. Operative culture showing morganella. No other clear source appreciated. Single set of blood cultures was negative. She's remained afebrile. This morning her WBC count continues trending down.  -antibiotics as below  -monitor CBCD  -monitor vitals     2. R hallux dry gangrene. In setting of severe PAD with critical limb ischemia and DMII. Patient underwent a course of IV cefazolin prior to surgical intervention. Podiatry took patient to the OR on 7/29/2024 for R hallux amputation and partial 1st ray resection. Wound is packed open and will likely require definitive closure once patient is medically stable. Betadine dressings to continue for now. Tissue culture with growth of morganella. The patient has transitioned to IV ceftriaxone. She should continue treatment through today for 7-days of post op treatment.  -complete IV ceftriaxone today, 8/5/2024, for 7 days of post-op treatment  -monitor CBCD and BMP  -monitor vitals  -serial R foot exams  -surgical site care per podiatry  -continue follow up with podiatry      3. PAD. LEAD from 7/16/2024 with severe B/L LE disease through the femoral-popliteal arteries with high grade stenosis vs occlusion. Vascular surgery has been following closely. Patient is s/p bilateral femoral endarterectomy with left to right femorofemoral PTFE bypass and retrograde left JACI, left EIA stenting, and bilateral groin VAC placement on 7/24/2024. She will need definitive R sided fem-pop bypass but this has been postponed for 2 weeks as patient does not feels she's up for surgery right now.  -serial B/L LE exams  -anticipate Fem-Pop bypass later this month  -VAC care per vascular surgery  -continue close follow up with vascular  surgery      4. Type 2 diabetes mellitus without long term insulin use. Patient's last HbA1c was 5.9% on 7/9/2024. Elevated blood glucose is risk factor for infection.   -recommend tight glycemic control  -blood glucose management per primary service     5. RICARDO. On CKD stage 3. This impacts antibiotic dosing. Upon review of patient's available medical records it appears her baseline creatinine is approximately 1.3-1.8. Creatinine upon admission was 2.19. Likely prerenal in setting of acute foot infection. Consider role of renal artery stenosis. Nephrology is following. Creatinine had trended down to 1.52 yesterday. Now awaiting morning lab draw.   -monitor creatinine  -dose adjust antibiotics for renal function as needed  -avoid nephrotoxins  -volume management per primary service/nephrology  -continue follow up with nephrology     6. Possible shock liver. Patient with development of elevated LFTs, ammonia, troponin, and lactic acid, along with episodes of hypotension earlier this week. She has been given albumin and PRBC. Cardiology is following. Gastroenterology is following. RUQ ultrasound with dopplers showed a dilated CBD. Portal venous flow was hepatopetal.  -monitor CBCD and CMP  -transfusion support per primary service  -dose adjust antibiotics for hepatic function as needed  -serial abdominal exams  -monitor GI symptoms  -continue follow up with cardiology  -continue follow up with gastroenterology     Above plan was discussed in detail with patient at the bedside.  Above plan was discussed in detail with NICOLLE who agrees with plan for completion of antibiotic treatment.    Antibiotics:  Ceftriaxone 5  Antibiotics 8    Subjective:  Patient reports she's tired of being in the hospital. Is not looking forward to having more surgery and is hoping that it eventually gets cancelled. Reports the hardest part of being in the hospital has been loneliness. She denies pain in her B/L groins, has no concern with the  VAC. She denies pain in her R foot. She has no fever, chills, sweats, shakes; no nausea or vomiting; no cough, shortness of breath, or chest pain. No new symptoms.    Objective:  Vitals:  Temp:  [97.3 °F (36.3 °C)-98 °F (36.7 °C)] 97.3 °F (36.3 °C)  HR:  [85-97] 85  Resp:  [16-18] 18  BP: (126-156)/(65-78) 154/73  SpO2:  [95 %-100 %] 100 %  Temp (24hrs), Av.7 °F (36.5 °C), Min:97.3 °F (36.3 °C), Max:98 °F (36.7 °C)  Current: Temperature: (!) 97.3 °F (36.3 °C)    Physical Exam:   General Appearance:  Alert, interactive, nontoxic, no acute distress. She appears chronically weak and debilitated.    Throat: Oropharynx moist without lesions.    Lungs:   Clear to auscultation bilaterally; no wheezes, rhonchi or rales; respirations unlabored on room air.   Heart:  Irregular; no murmur, rub or gallop.   Abdomen:   Soft, non-tender, non-distended, positive bowel sounds. B/L groin VAC dressings intact, no leakage or spreading erythema from site.    Extremities: R foot dressing remains intact.   Skin: No new rashes noted on exposed skin.     Labs, Imaging, & Other studies:   All pertinent labs and imaging studies were personally reviewed  Results from last 7 days   Lab Units 24  0440   WBC Thousand/uL 16.63*  --  25.63* 33.34*   HEMOGLOBIN g/dL 10.1* 10.0* 10.9* 10.5*   PLATELETS Thousands/uL 283  --  272 303     Results from last 7 days   Lab Units 24  04324  0520 24  0440   POTASSIUM mmol/L 4.4 2.9* 3.5 4.1   CHLORIDE mmol/L  --  99 104 107   CO2 mmol/L  --  32 25 26   BUN mg/dL  --  73* 69* 77*   CREATININE mg/dL  --  1.52* 1.88* 2.19*   EGFR ml/min/1.73sq m  --  32 25 21   CALCIUM mg/dL  --  8.4 8.4 8.6   AST U/L  --  52* 362* 814*   ALT U/L  --  103* 246* 330*   ALK PHOS U/L  --  120* 143* 139*     Results from last 7 days   Lab Units 24  1805   GRAM STAIN RESULT  Rare Polys*  Rare Gram negative rods*

## 2024-08-05 NOTE — ASSESSMENT & PLAN NOTE
New onset atrial fibrillation this admission multifactorial from surgery/anemia.  Did have bradycardia thought secondary to high-dose metoprolol.  No further episodes.  No need for pacemaker.  Anticoagulation: Cardiology deciding on eliquis

## 2024-08-05 NOTE — ASSESSMENT & PLAN NOTE
Lab Results   Component Value Date    HGBA1C 5.9 (H) 07/09/2024     Recent Labs     08/04/24  1603 08/04/24  2117 08/05/24  0716 08/05/24  1054   POCGLU 269* 243* 204* 191*     Stable continue levemir 10 units and lispro 5 units 3 times daily.

## 2024-08-05 NOTE — PROGRESS NOTES
[facility]  Progress Note  Name: Anamaria Parnell I  MRN: [id number]  Unit/Bed#: [place] [address] -01 I Date of Admission: 7/19/2024   Date of Service: 8/5/2024 I Hospital Day: 17    Assessment & Plan   * Aortoiliac occlusive disease (HCC)  Assessment & Plan  80 yo female ex-smoker w/ a hx of obesity, CKD IV, DM II w/ peripheral neuropathy, HLD, HTN, CAD, L MCA/PCA CVA S/P L TCAR 9/7/23 ([place]), FÉLIX, mesenteric artery stenosis and aortoiliac occlusive disease w/ PAD and chronic R hallux dry gangrene presented to [facility] on 7/16/24 w/ worsening R great toe pain and R hallux gangrene. Pt was transferred to [facility] on 7/19 w/ plans for R femoral endarterectomy w/ retrograde stenting and RLE bypass.     - s/p B/L CFAE, L JACI/EIA balloon angio and shockwave lithotripsy w/ insertion of a L EIA drug-coated stent and L JACI VBX stent, L to R PTFE fem-fem bypass, and B/L groin vac placement on 7/24.   - s/p right hallux amputation 7/29/24  - s/p 3U PRBC and 1U FFP intra-op and 1U PRBC on 7/30 and 7/31.    Plan was to return to the OR for right femoral to BK-pop bypass.  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 No evidence of acute infarct, intracranial hemorrhage or mass.  -7/29/24 LEAD: Right: Evidence of patent endarterectomy with diffuse disease noted throughout the femoral-popliteal arteries without significant focal stenosis. LISA: 0.53/48/22. Left: Evidence of patent endarterectomy with diffuse disease noted throughout the femoral-popliteal arteries without significant focal stenosis. L LISA:  0.57/10/43  -7/29/24 Xray R foot: Increasing volume of air within the soft tissues of the first toe in this patient with provided history of gangrene. The previously described cortical destruction along the ventral aspect of the first distal phalanx is not well visualized on the current examination, likely attributed to differences in patient positioning.  -7/22/24 Xray R foot: Cortical destruction along the ventral aspect of the first distal phalanx worrisome for acute osteomyelitis. Large air within the overlying soft tissues.   -7/21/24 Echo: Left ventricular wall thickness is severely increased. The left ventricular ejection fraction is 41% by biplane measurement. Systolic function is mildly reduced. Global longitudinal strain is reduced at -10% with moderate to severe strain reduction in the ED mid to basal anteroseptal septal and inferior walls.. Unable to grade diastolic dysfunction given the severe degree of mitral annular calcification.   -7/18/24 CTA abd w/ runoff: Visualized descending thoracic and suprarenal abdominal aorta demonstrate marked soft plaque with multifocal regions of plaque ulceration. A point of relative stenosis within the infrarenal abdominal aorta measures up to 7 mm in diameter. Bilateral multifocal severe stenosis within the external iliac and common femoral arteries. Bilateral long segment SFA occlusion extending from the ostia to the level of the P1 popliteal artery. Three-vessel runoff on the right, the posterior tibial artery is dominant. Two-vessel runoff on the left, the peroneal artery is dominant. Segmental visualization of the posterior tibial artery. Interval increase in size of an exophytic lesion arising medially from the stomach, again consistent with gastrointestinal stromal tumor. New dilation of the pancreatic duct within the pancreatic head, no obvious pancreatic/ampulla lesion identified.  -7/16/24 LEAD: Right: Severe diffuse disease noted throughout the  "femoral-popliteal arteries with high grade stenosis vs occlusion of the proximal-distal SFA with reconstitution to the popliteal artery and high grade stenosis vs occlusion of the distal YOVANY. R LISA: 0.11/-/-. Left: Severe diffuse disease noted throughout the femoral-popliteal arteries with high grade stenosis vs occlusion of the proximal-mid SFA with reconstitution in the distal SFA and high grade stenosis vs occlusion of the entire PTA. L LISA: 0.31/20/19.     Plan:   -Advanced calcific atherosclerotic AIOD/PAD w/ CTLI and R hallux dry gangrene (+) OM now s/p R hallux amputation w/ partial 1st ray resection by podiatry on 7/29  -S/P B/L CFAE, L JACI/EIA balloon angio and shockwave lithotripsy w/ insertion of a L EIA drug-coated stent and L JACI VBX stent, L to R PTFE fem-fem bypass, and B/L groin vac placement on 7/24 (POD #12)  -B/L groin wound vacs intact w/ adequate suction; continue wound vac changes qMWF  -No plan for additional vascular intervention at this time. Plan to discharge to nursing facility with open toe amp and b/l groin VACs. Follow up with vascular in 2 weeks to determine next steps of right fem pop bypass   -ID following for ABX management, input appreciated; surgical cultures growing Morganella morganii  -Cardiology following w/ plans for eventual consideration for PPM once infection resolved and revascularization complete   -Continue to monitor Hgb and transfuse < 7.0 per primary team  -Continue ASA, brilinta and pravastatin for medical management  -GI sign off, LFTs down trending. No indication for further imaging or intervention.  -Geriatrics input appreciated  -Continue medical management per primary team  -PT/OT follownig  -Will discuss w/ Dr. Sin           Subjective:  OOB to chair, NAD.  Denies pain, reports feeling \"tired\"  VAC intact to bilateral groin.  Continuous suction.  Minimal drainage  Patient continues to waiver regarding moving forward with surgery or not.   Reiterated patient's " "discussion with Dr. Gallo and her son.  Plan to hold on surgery at this time and follow-up in the office in 2 weeks time.  Will return for VAC change later today    Vitals:  /76 (BP Location: Left arm)   Pulse 96   Temp (!) 97.3 °F (36.3 °C) (Oral)   Resp 18   Ht 4' 10\" (1.473 m)   Wt 64.2 kg (141 lb 8.6 oz)   LMP  (LMP Unknown)   SpO2 95%   BMI 29.58 kg/m²     I/Os:  I/O last 3 completed shifts:  In: 480 [P.O.:480]  Out: 815 [Urine:615; Drains:200]  I/O this shift:  In: 240 [P.O.:240]  Out: 75 [Drains:75]    Lab Results and Cultures:   Lab Results   Component Value Date    WBC 16.63 (H) 08/04/2024    HGB 10.1 (L) 08/04/2024    HCT 31.0 (L) 08/04/2024    MCV 97 08/04/2024     08/04/2024     Lab Results   Component Value Date    GLUCOSE 179 (H) 07/24/2024    CALCIUM 8.4 08/04/2024    K 4.4 08/04/2024    CO2 32 08/04/2024    CL 99 08/04/2024    BUN 73 (H) 08/04/2024    CREATININE 1.52 (H) 08/04/2024     Lab Results   Component Value Date    INR 1.70 (H) 07/31/2024    INR 2.10 (H) 07/30/2024    INR 1.51 (H) 07/24/2024    PROTIME 20.1 (H) 07/31/2024    PROTIME 23.7 (H) 07/30/2024    PROTIME 18.4 (H) 07/24/2024        Blood Culture:   Lab Results   Component Value Date    BLOODCX No Growth After 5 Days. 07/17/2024   ,   Urinalysis:   Lab Results   Component Value Date    COLORU Light Yellow 07/17/2024    CLARITYU Clear 07/17/2024    SPECGRAV 1.016 07/17/2024    PHUR 5.5 07/17/2024    LEUKOCYTESUR Moderate (A) 07/17/2024    NITRITE Negative 07/17/2024    GLUCOSEU Negative 07/17/2024    KETONESU Negative 07/17/2024    BILIRUBINUR Negative 07/17/2024    BLOODU Negative 07/17/2024   ,   Urine Culture:   Lab Results   Component Value Date    URINECX (A) 07/17/2024     >100,000 cfu/ml - Lactobacillus gasseri Lactobacillus species    URINECX <10,000 cfu/ml Micrococcus luteus (A) 07/17/2024    URINECX (A) 07/17/2024     <10,000 cfu/ml - Lactobacillus rhamnosus Lactobacillus species   ,   Wound Culure: No " "results found for: \"WOUNDCULT\"    Medications:  Current Facility-Administered Medications   Medication Dose Route Frequency    allopurinol (ZYLOPRIM) tablet 100 mg  100 mg Oral Daily    aspirin chewable tablet 81 mg  81 mg Oral Daily    cefTRIAXone (ROCEPHIN) 1,000 mg in dextrose 5 % 50 mL IVPB  1,000 mg Intravenous Q24H    escitalopram (LEXAPRO) tablet 20 mg  20 mg Oral Daily    heparin (porcine) subcutaneous injection 5,000 Units  5,000 Units Subcutaneous Q8H JAVIER    HYDROmorphone HCl (DILAUDID) injection 0.2 mg  0.2 mg Intravenous Q6H PRN    insulin detemir (LEVEMIR) subcutaneous injection 10 Units  10 Units Subcutaneous Daily    insulin lispro (HumALOG/ADMELOG) 100 units/mL subcutaneous injection 1-5 Units  1-5 Units Subcutaneous TID AC    insulin lispro (HumALOG/ADMELOG) 100 units/mL subcutaneous injection 5 Units  5 Units Subcutaneous TID With Meals    lidocaine (LIDODERM) 5 % patch 1 patch  1 patch Topical Daily    metoprolol succinate (TOPROL-XL) 24 hr tablet 25 mg  25 mg Oral Daily    ondansetron (ZOFRAN) injection 4 mg  4 mg Intravenous Q6H PRN    oxyCODONE (ROXICODONE) IR tablet 5 mg  5 mg Oral Q6H PRN    pantoprazole (PROTONIX) EC tablet 40 mg  40 mg Oral BID    sodium bicarbonate tablet 650 mg  650 mg Oral BID after meals    ticagrelor (BRILINTA) tablet 90 mg  90 mg Oral Q12H JAVIER    torsemide (DEMADEX) tablet 10 mg  10 mg Oral Daily       Imaging:  As above    Physical Exam:    General appearance: alert and oriented, in no acute distress  Lungs:  Even unlabored  Heart: regular rate and rhythm, S1, S2 normal, no murmur, click, rub or gallop  Abdomen: soft, non-tender; bowel sounds normal; no masses,  no organomegaly  Extremities: Warm, perfused, M/S intact.  Right foot podiatric dressing in place    Wound/Incision:  B/l groin wounds with VAC intact.  Continuous suction 125 mmHg.  Minimal drainage.  Dressing change later today        GRACIELA Vogt  8/5/2024  [facility], [phone]      "

## 2024-08-05 NOTE — ASSESSMENT & PLAN NOTE
History of PAD CAD diabetes and hypertension presented to [facility] for right great toe gangrene transferred here for vascular bypass.  This admission: Status post bilateral femoral endarterectomy with bypass and JACI/EIA stenting  VAC still present.  Continue aspirin and Brilinta.  Gangrene of toe/cellulitis.  Status post first ray amputation.  Ceftriaxone end date today.  Disposition: To rehab

## 2024-08-05 NOTE — PROGRESS NOTES
Progress Note - Geriatric Medicine   Anamaria Parnell 79 y.o. female MRN: [id number]  Unit/Bed#: [place] [address] -01 Encounter: [id number]      Assessment/Plan:    Acute Encephalopathy  Presented to the hospital for right great toe pain/gangrene  Baseline mentation is alert and oriented x 4   Patient was noted to have altered mental status on 7/30 and there was some concern for stroke   Workup was negative   Current cause considerations   Suspected to be multifactorial secondary to right hallux gangrene status post right partial first ray amputation, status post bilateral femoral endarterectomy with left to right femorofemoral PTFE bypass and retrograde left JACI, left EIA stenting, and bilateral groin VAC placement, acute pain, acute liver failure without hepatic coma (LFTs improving), sleep disturbances, vision impairment, and prolonged hospitalization   Patient was noted to have lactic acidosis the night of 7/30 which has since resolved  UA negative for UTI on admission   Leukocytosis noted on labs yesterday but is improving (down to 16.63 from 25.63)  ID is currently consulted and following  Hemoglobin stable at 10.1 on labs today   Patient is alert and awake on my exam today   She is oriented to person, place (hospital), and month   She notes the year to be 1974  She is pleasant, calm, and cooperative on my exam today   Identify and treat reversible causes of confusion including infection, dehydration, electrolyte imbalance, anemia, hypoxia, urinary retention, constipation, pain, and sleep disturbance  Maintain delirium precautions   Provide redirection, reorientation, and distraction techniques  Maintain fall and safety precautions   Assist with ADLs/IADLs  Avoid deliriogenic medications such as tramadol, benzodiazepines, anticholinergics, benadryl  Treat pain using geriatric pain protocol   Encourage oral hydration and nutrition   Monitor for constipation and urinary retention   Implement sleep hygiene and limit  night time interuptions   Maintain sleep-wake cycle   Encourage early and frequent mobilization   Most recent EKG on 7/30/2024 revealed a QTc interval of 525  Medication at this time is extremely limited for acute agitation and behaviors   Would avoid antipsychotics for acute agitation and behaviors secondary to prolonged QTc interval   Would also avoid Depakote due to acute liver failure   While not typically recommended in geriatric patients can consider low dose lorazepam 0.25 mg if needed for acute agitation and behaviors as other options are not feasible at this time   Redirect and reorient as needed  Keep mentally, physically, and socially active    Cognitive Screening   Per chart review, it appears that the patient has a prior history of memory loss, however, I do not see any workup for this diagnosis and it does not appear as a documented diagnosis in [facility]  Patients son notes the patient is alert and oriented x 4 at baseline and has no issues or difficulties with her memory   She is oriented to person, place (hospital), and month   She notes the year to be 1972  She is pleasant, calm, and cooperative without behaviors   Most recent TSH on 7/27/2024 noted to be 2.309  Most recent vitamin B 12 level on 2/25/2019 noted to be 502  Consider checking on routine labs   CT of the head on 7/30/2024 revealed chronic left PCA infarct and microangiopathic changes   No MoCA or cognitive testing noted in epic  Maintain delirium precautions as discussed below  Redirect and reorient as needed  Keep physically, mentally, and socially active     Deconditioning   Patient is at increased risk for deconditioning secondary to right hallux gangrene status post right partial first ray amputation, status post bilateral femoral endarterectomy with left to right femorofemoral PTFE bypass and retrograde left JACI, left EIA stenting, and bilateral groin VAC placement, anesthesia, acute pain, acute blood loss anemia (hemoglobin  improving), weakness, gait dysfunction, and prolonged hospitalization   Continue to optimize diet, hydration, and mobility for healing   Stage IV Chronic Kidney Disease   Baseline creatinine appears to be 1.2-1.8  Patient now with RICARDO which appears to have resolved (creatinine 1.52 on labs today)  Patient does follow with neurology in the outpatient setting   Nephrology had been consulted but has since signed off   Avoid nephrotoxic medication and renal dose medication   Keep hydrated   PO intake  Patient notes she has a decreased appetite   She notes that she used to have a very good appetite when she was on insulin but since being transitioned to oral tradjenta her appetite has decreased   She does drink carnation breakfast drinks at home  She is receiving glucerna with meals here  Encourage oral hydration and nutrition   Congestive heart failure   Most recent echo on 7/21/2024 revealed an EF of 41%  Patient does not appear to be on any diuretics at baseline  She is currently on torsemide 10 mg daily though this was held on 7/30 and 7/31 due to low blood pressures   Cardiology is consulted and following  Recommend low-sodium diet  Continue to monitor weights and I&O  Management per cardiology  Type II Diabetes   Hemoglobin A1c on 7/9/2024 noted to be 5.9 which is pretty tightly controlled for patient's age  Patient has been on insulin in the past but once her A1c was improved she was transitioned over to Tradjenta which she and her son note have affected her appetite  Patient is currently on blood sugar checks and while blood sugars have been elevated they are acceptable  Her current regimen is Humalog SSI with meals and Levemir 10 units daily  Continue insulin and diabetic diet  Avoid hypoglycemia  Acute Blood Loss Anemia  Baseline hemoglobin appears to be 11-13  She has been primarily trending in the 7's for the past several days  She was noted to have a hemoglobin of 6.4 on 7/30 and received a unit of  blood  Her hemoglobin on 7/31 was 7.4 and she received another unit of blood  Hemoglobin on labs today stable at 10.1  Continue to monitor CBC  Transfuse for hemoglobin < 7   Monitor for signs and symptoms of infection, dehydration, DVT, and skin breakdown    Frailty   Clinical Frail Scale: 5- Mildly Frail (baseline)  More evident slowing, needs help high order IADLs (transport, bills, medications)  Progressively impairs shopping and walking outside alone, meal prep and housework  Clinical Frail Scale: 6- Moderately Frail (current)  Need help with all outside activities  Need help with stairs and bathing  May need assistance with dressing  Most recent albumin on labs yesterday noted to be 3.0  Consider nutrition consult  Encourage protein supplementation     Ambulatory Dysfunction/Falls  Patient has not had any recent falls at home  She notes she has both a roller walker and cane at home  Her son notes that she has an electric scooter that she primarily uses and then will supplement with the cane when ambulating   PT/OT consulted to assist with strengthening/mobility and assist with discharge planning to appropriate level of care  Assess patient frequently for physical needs, encourage use of assistant devices as needed and directed by PT/OT  Identify cognitive and physical deficits and behaviors that affect risk of falls  Consider moving patient closer to nursing station to monitor more closely for impulsive behavior which may increase risk of falls  [place] fall precautions   Educate patient/family on patient safety including physical limitations and importance of using call bell for assistance   Modify environment to reduce risk of injury including disconnecting from pole when not in use, ensuring adequate lighting in room and restroom, ensuring that path to restroom is clear and free of trip hazards  Out of bed as tolerated     Impaired Vision   Patient does have vision impairment   She does wear  eyeglasses  Recommend use of corrective lenses at all appropriate times  Encourage adequate lighting and encourage use of assistance with ambulation  Keep personal belongings close to avoid reaching  Encourage appropriate footwear at all times  Recommend large font for printed materials provided to patient    Dentition/Appetite   Patient does not wear dentures at baseline   She notes that her appetite is fair as discussed above   Ensure meal consistency is appropriate for all abilities   Consider nutrition consult   Continue aspiration precautions     Elimination   Patient is continent of bowel and bladder at baseline  She has been recently having incontinent episodes here   Suspect this may be related to encephalopathy   Patient has no documented history of constipation   Last documented bowel movement was yesterday   She had been receiving lactulose TID but this appears to have been discontinued   Monitor for constipation and urinary retention     Insomnia   Patient notes she has some difficulty sleeping at home   She notes that she sleeps in a recliner chair at baseline   She is on trazodone 50 mg daily at bedtime at baseline   Her son noted that he works overnights and does not get home until around 0300 and she is often awake when he gets home and she sleeps on his schedule  Trazodone is currently on hold   Nursing notes that the patient slept intermittently overnight   Can consider restarting trazodone at bedtime if needed now that mental status is improved  First line is behavioral therapy   Avoid sedative hypnotics including benzodiazepines and benadryl  Encourage staying awake during the day   Encourage daytime activities and morning exercise   Decrease or eliminate daytime naps   Avoid caffeine especially during late afternoon and evening hours  Establish a nighttime routine  Implement sleep hygiene and limit nighttime interruptions    Anxiety/Depression  Patient has a documented history of depression    She is on escitalopram at baseline   Mood appears stable on exam today   Continue current medication regimen    Continue supportive care     Right Hallux Gangrene  Patient noted to have gangrene of the right hallux on admission to [facility]  She was transferred to [facility] for surgical vascular intervention  She was subsequently evaluated by podiatry for this and is now POD 7 from a right partial first ray amputation  Intraoperative cultures were obtained and are pending  Dressing changes to be completed by podiatry  Patient remains on IV antibiotics secondary to leukocytosis  ID is consulted and following  Patient is currently denying pain on exam today  Pain management as discussed below  PT/OT consulted and following  Management per podiatry and ID    Aortoiliac Occlusive Disease   Patient with PAD with occlusion of multiple vessels  Patient was transferred from [facility] to [facility] for surgical vascular intervention  Patient is now POD 12 from a bilateral femoral endarterectomy with left to right femorofemoral PTFE bypass and retrograde left JACI, left EIA stenting, and bilateral groin VAC placement   Wound VAC remains intact  Patient was to return to the OR last week, however, this was postponed secondary to encephalopathy   Patient was tentatively returning to the OR tomorrow, however, it now appears that further intervention is on hold indefinitely   Patient is currently denying pain  Pain management as discussed below  Management per vascular surgery    Acute Liver Failure without Hepatic Coma  Patient with elevated LFTs suspected to be related to acute illness and cephalosporins  There was some concern for shock on 7/30 as the patient was anemic and had elevated troponins  Ammonia level on 7/30 noted to be elevated at 73  Patient was started on lactulose last week and ammonia level on labs on 7/31 noted to be improved 48  Lactulose has since been discontinued   GI  is consulted and following  An ultrasound of the right upper quadrant on 7/31 revealed the common duct is mildly dilated at 8 mm  Recommended nonemergent MRCP for persistent LFT abnormality  LFTs have significantly improved  Continue to monitor liver function   Management per primary team     Elevated Troponins  Patient was noted to have elevated troponins on 7/30  She was noted to be tachycardic and anemic  She did receive a unit of blood on 7/30 and anemia improved  She had been on telemetry but this has since been discontinued   Cardiology is consulted and following  Management per cardiology and primary team    Acute Pain due to Surgery  Patient is POD 7 from a right partial first ray amputation and POD 12 from a bilateral femoral endarterectomy with left to right femorofemoral PTFE bypass and retrograde left JACI, left EIA stenting, and bilateral groin VAC placement   She is currently denying pain on exam today  Would recommend treating pain using the geriatric pain protocol as discussed below  Oxycodone 2.5 mg po Q 4 prn moderate pain  Oxycodone 5 mg po Q 4 prn severe pain   Dilaudid 0.2 mg IV Q 4 prn breakthrough pain  Would avoid Tylenol and gabapentin at this time secondary to acute liver failure and RICARDO  Monitor for constipation  Continue nonpharmacological methods of pain management      Subjective:   The patient is being seen and evaluated today at the bedside for geriatric follow-up.  She is noted to be lying in bed comfortably in no acute distress. She states she is feeling okay today. She is alert and oriented to person, place (hospital), and month. She notes the year to be 1974. She is pleasant, calm, and cooperative.  She is currently denying pain.  She denies chest pain or shortness of breath.  She notes that she continues to have a poor appetite.  She admits that she is having frequent bowel movements. She states she slept okay last night.     Care was coordinated with patients nurse Dennys.  She  "notes no acute issues or events overnight.        Review of Systems   Constitutional:  Positive for activity change and appetite change (decreased appetite). Negative for fatigue.   HENT:  Negative for dental problem and hearing loss.    Eyes:  Positive for visual disturbance (glasses).   Respiratory:  Negative for cough and shortness of breath.    Cardiovascular:  Negative for chest pain and leg swelling.   Gastrointestinal:  Negative for abdominal distention and abdominal pain.   Genitourinary:  Negative for difficulty urinating and dysuria.   Musculoskeletal:  Positive for gait problem. Negative for arthralgias.   Skin:  Negative for color change and pallor.   Neurological:  Positive for weakness. Negative for speech difficulty.   Psychiatric/Behavioral:  Positive for confusion (improving). Negative for agitation and sleep disturbance. The patient is not nervous/anxious.          Objective:     Vitals: Blood pressure 157/76, pulse 96, temperature (!) 97.3 °F (36.3 °C), temperature source Oral, resp. rate 18, height 4' 10\" (1.473 m), weight 64.2 kg (141 lb 8.6 oz), SpO2 95%.,Body mass index is 29.58 kg/m².      Intake/Output Summary (Last 24 hours) at 8/5/2024 0981  Last data filed at 8/5/2024 0830  Gross per 24 hour   Intake 440 ml   Output 890 ml   Net -450 ml       Current Medications: Reviewed    Physical Exam:   Physical Exam  Vitals and nursing note reviewed.   Constitutional:       General: She is not in acute distress.     Appearance: She is not ill-appearing.   HENT:      Head: Normocephalic.      Mouth/Throat:      Mouth: Mucous membranes are dry.   Eyes:      General: No scleral icterus.     Conjunctiva/sclera: Conjunctivae normal.   Cardiovascular:      Rate and Rhythm: Normal rate and regular rhythm.   Pulmonary:      Effort: Pulmonary effort is normal. No respiratory distress.   Abdominal:      General: Bowel sounds are normal. There is no distension.      Palpations: Abdomen is soft.      " "Tenderness: There is no abdominal tenderness.   Musculoskeletal:         General: No swelling or tenderness.   Skin:     General: Skin is warm and dry.      Comments: Wound VAC to groin   Right foot dressing clean, dry, and intact   Neurological:      Mental Status: She is alert.      Motor: Weakness present.      Gait: Gait abnormal.      Comments: Oriented to person, place (hospital), and month   Disoriented to year             Invasive Devices       Peripheral Intravenous Line  Duration             Peripheral IV 07/31/24 Right Antecubital 5 days    Peripheral IV 08/04/24 Left;Upper;Ventral (anterior) Arm 1 day                    Lab, Imaging and other studies: I have personally reviewed pertinent reports.      Please note:  Voice-recognition software may have been used in the preparation of this document.  Occasional wrong word or \"sound-alike\" substitutions may have occurred due to the inherent limitations of voice recognition software.  Interpretation should be guided by context.    "

## 2024-08-05 NOTE — NURSING NOTE
"Pt refused morning labs with the PCA and then this RN went in to talk with her. RN advised pt that, yes, we draw labs every day and explained why they are necessary. Pt stated that she spoke with her cardiologist and he said she didn't have to get any more needles. Pt stated, \"I just don't want to do this anymore. You don't understand the suffering.\" Pt said she will talk with her son and her doctor later today.  "

## 2024-08-05 NOTE — ASSESSMENT & PLAN NOTE
New onset Afib dx by EKG on 7/26/24 however converted spontaneously before she was placed on telemetry.   [id number]. Given her anemia, DAPT, recent vascular surgery and low A-fib burden, will discuss NOAC vs continuing DAPT with Vascular surgery.  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   On POD 4 she had symptomatic bradycardia possible 2-1 AV block. Her Toprol 100 mg QD was reversed and high degree AV block resolved. Has not recurred.   8/1 Started lower dose BB Toprol 25 mg QD and seems like HR overall controlled. Telemetry was just restarted today. Continue.  Her Right hallux gangrene source was controlled. Infectious disease completed Abx treatment for infection today. There is no further infection however there are still open wounds (groin & R foot). Since she is no longer having symptomatic Bradycardia we will hold off on transfer for PPM this admission.

## 2024-08-05 NOTE — QUICK NOTE
Vascular surgery ok with changing anti-platelet + NOAC.    Plavix is the more studied P2Y12 used in trials evaluating the safety of NOAC + anti-platelet therapy. Both brilinta and plavix have been studied with the NOACs but plavix just has most data.    In July 2023 patient was taking Plavix but suffered a stroke. Plavix was changed to Brilinta + ASA.    A P2Y12 study should be checked prior to changing the Brilinta to Plavix. This blood test can only be done as an OP at [facility] lab.    We will use Brilinta + Eliquis 5 mg BID for now and as long as the P2Y12 study shows reactivity can switch to Plavix + Eliquis.

## 2024-08-05 NOTE — ASSESSMENT & PLAN NOTE
Goal Hgb > 8. Stable now at 10 s/p 2 u pRBC transfusions last week.  Will discuss with Vascular about anticoagulation preferences. Ideally would start NOAC given h/o Afib.

## 2024-08-05 NOTE — ASSESSMENT & PLAN NOTE
78 yo female ex-smoker w/ a hx of obesity, CKD IV, DM II w/ peripheral neuropathy, HLD, HTN, CAD, L MCA/PCA CVA S/P L TCAR 9/7/23 ([place]), FÉLIX, mesenteric artery stenosis and aortoiliac occlusive disease w/ PAD and chronic R hallux dry gangrene presented to [facility] on 7/16/24 w/ worsening R great toe pain and R hallux gangrene. Pt was transferred to [facility] on 7/19 w/ plans for R femoral endarterectomy w/ retrograde stenting and RLE bypass.     - s/p B/L CFAE, L JACI/EIA balloon angio and shockwave lithotripsy w/ insertion of a L EIA drug-coated stent and L JACI VBX stent, L to R PTFE fem-fem bypass, and B/L groin vac placement on 7/24.   - s/p right hallux amputation 7/29/24  - s/p 3U PRBC and 1U FFP intra-op and 1U PRBC on 7/30 and 7/31.    Plan was to return to the OR for right femoral to BK-pop bypass.  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 No evidence of acute infarct, intracranial hemorrhage or mass.  -7/29/24 LEAD: Right: Evidence of patent endarterectomy with diffuse disease noted throughout the femoral-popliteal arteries without significant focal stenosis. LISA: 0.53/48/22. Left: Evidence of patent endarterectomy with diffuse disease noted throughout the femoral-popliteal arteries without significant focal stenosis. L LISA: 0.57/10/43  -7/29/24 Xray R foot: Increasing volume of air within the soft tissues of the first toe in this patient with provided history of gangrene. The previously described cortical destruction along the ventral aspect of the first distal phalanx is not well visualized on the current  examination, likely attributed to differences in patient positioning.  -7/22/24 Xray R foot: Cortical destruction along the ventral aspect of the first distal phalanx worrisome for acute osteomyelitis. Large air within the overlying soft tissues.   -7/21/24 Echo: Left ventricular wall thickness is severely increased. The left ventricular ejection fraction is 41% by biplane measurement. Systolic function is mildly reduced. Global longitudinal strain is reduced at -10% with moderate to severe strain reduction in the ED mid to basal anteroseptal septal and inferior walls.. Unable to grade diastolic dysfunction given the severe degree of mitral annular calcification.   -7/18/24 CTA abd w/ runoff: Visualized descending thoracic and suprarenal abdominal aorta demonstrate marked soft plaque with multifocal regions of plaque ulceration. A point of relative stenosis within the infrarenal abdominal aorta measures up to 7 mm in diameter. Bilateral multifocal severe stenosis within the external iliac and common femoral arteries. Bilateral long segment SFA occlusion extending from the ostia to the level of the P1 popliteal artery. Three-vessel runoff on the right, the posterior tibial artery is dominant. Two-vessel runoff on the left, the peroneal artery is dominant. Segmental visualization of the posterior tibial artery. Interval increase in size of an exophytic lesion arising medially from the stomach, again consistent with gastrointestinal stromal tumor. New dilation of the pancreatic duct within the pancreatic head, no obvious pancreatic/ampulla lesion identified.  -7/16/24 LEAD: Right: Severe diffuse disease noted throughout the femoral-popliteal arteries with high grade stenosis vs occlusion of the proximal-distal SFA with reconstitution to the popliteal artery and high grade stenosis vs occlusion of the distal YOVANY. R LISA: 0.11/-/-. Left: Severe diffuse disease noted throughout the femoral-popliteal arteries with high  grade stenosis vs occlusion of the proximal-mid SFA with reconstitution in the distal SFA and high grade stenosis vs occlusion of the entire PTA. L LISA: 0.31/20/19.     Labs:  - WBC 16.63  - Hgb 10.1    Plan:   -Advanced calcific atherosclerotic AIOD/PAD w/ CTLI and R hallux dry gangrene (+) OM now s/p R hallux amputation w/ partial 1st ray resection by podiatry on 7/29  -S/P B/L CFAE, L JACI/EIA balloon angio and shockwave lithotripsy w/ insertion of a L EIA drug-coated stent and L JACI VBX stent, L to R PTFE fem-fem bypass, and B/L groin vac placement on 7/24 (POD #12)  -B/L groin wound vacs intact w/ adequate suction; continue wound vac changes qMWF  -No plan for additional vascular intervention at this time. Plan to discharge to nursing facility with open toe amp and b/l groin VACs. Follow up with vascular in 2 weeks to determine next steps of right fem pop bypass   -ID following for ABX management, input appreciated; surgical cultures growing Morganella morganii  -Cardiology following w/ plans for eventual consideration for PPM once infection resolved and revascularization complete   -Continue to monitor Hgb and transfuse < 7.0 per primary team  -Continue ASA, brilinta and pravastatin for medical management  -GI sign off, LFTs down trending. No indication for further imaging or intervention.  -Geriatrics input appreciated  -Continue medical management per primary team  -PT/OT follownig  -Will discuss w/ Dr. Sin

## 2024-08-05 NOTE — PHYSICAL THERAPY NOTE
Physical Therapy Cancellation Note                  Attempted to see pt for PT treatment session.  Pt  refusing at this time reporting she was up and out of bed in the chair for most of the day and just recently returned back to bed.  Pt  is reporting fatigue. Pt continued to refuse despite education and encouragement to participate in PT session.  Will continue to follow at a later time as per PT POC.    Prabha Okeefe, PTA

## 2024-08-05 NOTE — ASSESSMENT & PLAN NOTE
Blood pressures are stable.  Off amlodipine and clonidine.  Labetalol changed to metoprolol by cardiology

## 2024-08-05 NOTE — OCCUPATIONAL THERAPY NOTE
Occupational Therapy Cancellation        Patient Name: Anamaria Parnell  Today's Date: 8/5/2024 08/05/24 1448   OT Last Visit   OT Visit Date 08/05/24   Note Type   Note type Cancelled Session   Cancel Reasons Refusal   Additional Comments Attempted to see pt this afternoon. Pt politely refused stating she was up in the chair most of the day and recently got back in bed. Reports that she is very fatigued. Edu on importance of skilled OT, pt understanding but continued to refuse. CX OT at this time, will continue to follow.     Anna Wood, OT

## 2024-08-05 NOTE — ASSESSMENT & PLAN NOTE
H/o L MCA and PCA watershed CVA due to L ICA stenosis   s/p L TCAR 9/7/23   on DAPT with ASA & Brilinta. Continue.

## 2024-08-05 NOTE — ASSESSMENT & PLAN NOTE
Acute kidney injury creatinine 2.37 secondary to sepsis.    Resolved.    Results from last 7 days   Lab Units 08/04/24  0434 08/02/24  0520 08/01/24  0440 07/31/24  0502 07/30/24  1808 07/30/24  0449   BUN mg/dL 73* 69* 77* 77* 70* 56*   CREATININE mg/dL 1.52* 1.88* 2.19* 2.37* 2.17* 1.80*   EGFR ml/min/1.73sq m 32 25 21 19 21 26

## 2024-08-05 NOTE — PROGRESS NOTES
ISAAC Procedure Note    Date: 8/5/24   Time: 1355     Location of wound: bilateral groin     Sponges removed:  3 Black Sponges TOTAL  0 White Sponges     Description of wound:   B/l surgical groin wounds with fatty tissue L>R) and granulation tissue.  Draining serous fluid. No malodor.           RIGHT       LEFT    Sponges placed:  4 Black Sponges TOTAL (one to each groin with bridge for better positioning)  0 White Sponges     VAC settings:   Y connector placed  125 mmHg  Continuous     Pt tolerated procedure well  VAC sticker placed to wound dressing, per protocol    GRACIELA Huerta  Vascular Surgery  [phone]

## 2024-08-05 NOTE — ASSESSMENT & PLAN NOTE
Echo post-op R hallux amputation showed worsening EF (45--> 30%). Global hypokinesis with regional abnormalities similar to prior. Post-op Trop elevation (up to 4677), is multifactorial and likely stress induced from surgery on top of known underlying CAD in this very high risk patient with comorbidities and shock picture post-op.  Will need close monitoring for development of volume overload. Daily standing weights as tolerated.  Would continue GDMT and recheck echo in 3 months. Hopefully if shock resolves and BP remains stable her EF will improve at least to >35%.   Neurohormonal Blockade:  --Beta Blocker: Toprol 25 mg QD  --ARNi / ACEi / ARB: Losartan 100 mg QD on hold given post- op RICARDO.  --Aldosterone Antagonist: None  --SGLT2 Inhibitor: None  --Home Diuretic: Torsemide 10 mg QD  Should the patient undergo more vascular surgery in the coming weeks, would check echo sooner (1 week post-op)  She is presently not a candidate for ICD given her AMS and open wounds (groin wounds, amputation wound).

## 2024-08-05 NOTE — PROGRESS NOTES
[facility]  Progress Note  Name: Anamaria Parnell I  MRN: [id number]  Unit/Bed#: [place] [address] [id number] I Date of Admission: 7/19/2024   Date of Service: 8/5/2024 I Hospital Day: 17    Assessment & Plan   Acute blood loss anemia  Goal Hgb > 8. Stable now at 10 s/p 2 u pRBC transfusions last week.  Will discuss with Vascular about anticoagulation preferences.; ideally would like Plavix + Eliquis.    Chronic HFrEF, LVEF 30%, LVIDd 4.6 cm, AHA Stage C  Echo post-op R hallux amputation showed worsening EF (45--> 30%). Global hypokinesis with regional abnormalities similar to prior. Post-op Trop elevation (up to 4677), is multifactorial and likely stress induced from surgery on top of known underlying CAD in this very high risk patient with comorbidities and shock picture post-op.  Will need close monitoring for development of volume overload. Daily standing weights as tolerated.  Would continue GDMT and recheck echo in ~3 months. Hopefully if shock resolves and BP remains stable her EF will improve at least to >35%.   Neurohormonal Blockade:  --Beta Blocker: Toprol 25 mg QD.   --ARNi / ACEi / ARB: Losartan 100 mg QD on hold given post- op RICARDO. Once RICARDO resolves, would resume.  --Aldosterone Antagonist: None  --SGLT2 Inhibitor: None  --Home Diuretic: Torsemide 10 mg QD  Should the patient undergo more vascular surgery in the coming weeks, would check echo sooner (1 week post-op)  She is presently not a candidate for ICD given her AMS and open wounds (groin wounds, amputation wound).    Paroxysmal atrial fibrillation (HCC)  New onset Afib dx by EKG on 7/26/24 however converted spontaneously before she was placed on telemetry.   [id number]. Would start Eliquis 5 mg BID, will discuss with Vascular.  The BB is only for HFrEF; not for rate control. Don't increase the BB dose beyond 25 mg QD.    Tachybradycardia syndrome  Baseline LBBB and 1st-degree AV block.   On POD 4 she had symptomatic  bradycardia possible 2-1 AV block. Her Toprol 100 mg QD was reversed and high degree AV block resolved. Has not recurred.   8/1 Started lower dose BB Toprol 25 mg QD and seems like HR overall controlled.   Her Right hallux gangrene source was controlled. Infectious disease completed Abx treatment for infection today. There is no further infection however there are still open wounds (groin & R foot). Since she is no longer having symptomatic Bradycardia we will hold off on transfer for PPM this admission.  No need for zio patch on dc. Just monitor for sxs of symptomatic bradycardia.    Coronary artery disease of native artery of native heart with stable angina pectoris (HCC)  2020 Cath showed chronic occlusive CAD. No interventions. Medical management. 2023 Stress test was negative for myocardial ischemia.    H/o CVA  H/o L MCA and PCA watershed CVA due to L ICA stenosis   s/p L TCAR 9/7/23     Subjective:  Pt  reports she feels pain mostly in the left groin. Pt reports she doesn't feel right at all just tiredness and malaise. Denies cp or sob. RN reports this mentation level is good for her compared to last week.    Vitals:  Vitals:    08/04/24 0600 08/05/24 0600   Weight: 63.4 kg (139 lb 12.4 oz) 64.2 kg (141 lb 8.6 oz)   ,  Vitals:    08/04/24 2000 08/04/24 2115 08/05/24 0600 08/05/24 0716   BP:  154/73  157/76   BP Location:  Right arm  Left arm   Pulse:  85  96   Resp:  18  18   Temp:  (!) 97.3 °F (36.3 °C)  (!) 97.3 °F (36.3 °C)   TempSrc:  Oral  Oral   SpO2: 97% 100%  95%   Weight:   64.2 kg (141 lb 8.6 oz)    Height:         Exam:  Physical Exam  Vitals and nursing note reviewed.   Constitutional:       General: She is not in acute distress.     Appearance: She is ill-appearing. She is not diaphoretic.   HENT:      Head: Normocephalic and atraumatic.      Nose: Nose normal.      Mouth/Throat:      Mouth: Mucous membranes are moist.   Eyes:      Conjunctiva/sclera: Conjunctivae normal.   Neck:      Comments:  No jvd  Cardiovascular:      Rate and Rhythm: Regular rhythm.      Heart sounds: S1 normal and S2 normal. No murmur heard.     Comments: Hr approx 90 bpm  Pulmonary:      Effort: Pulmonary effort is normal.      Breath sounds: No wheezing, rhonchi or rales.   Abdominal:      General: There is no distension.   Musculoskeletal:         General: No swelling.   Skin:     Comments: Wound vac groin wounds. Right foot is dressed. No pitting edema in the exposed bl le   Neurological:      Mental Status: She is alert and oriented to person, place, and time.      Comments: Grossly moving all limbs   Psychiatric:      Comments: Cognitive changes in the older adult  No acute delirium     Telemetry:       Only just restarted this AM. So far SR with hr averaging 98 bpm    Medications:    Current Facility-Administered Medications:     allopurinol (ZYLOPRIM) tablet 100 mg, 100 mg, Oral, Daily, Costa Omer DPM, 100 mg at 08/05/24 0901    aspirin chewable tablet 81 mg, 81 mg, Oral, Daily, Costa Omer DPM, 81 mg at 08/05/24 0901    cefTRIAXone (ROCEPHIN) 1,000 mg in dextrose 5 % 50 mL IVPB, 1,000 mg, Intravenous, Q24H, Amaya Mujica DO, Last Rate: 100 mL/hr at 08/05/24 0116, 1,000 mg at 08/05/24 0116    escitalopram (LEXAPRO) tablet 20 mg, 20 mg, Oral, Daily, Costa Omer DPM, 20 mg at 08/05/24 0901    heparin (porcine) subcutaneous injection 5,000 Units, 5,000 Units, Subcutaneous, Q8H JAVIER, Cullen Reinoso MD, 5,000 Units at 08/05/24 0708    HYDROmorphone HCl (DILAUDID) injection 0.2 mg, 0.2 mg, Intravenous, Q6H PRN, Costa Omer DPM    insulin detemir (LEVEMIR) subcutaneous injection 10 Units, 10 Units, Subcutaneous, Daily, Cullen Reinoso MD, 10 Units at 08/05/24 0901    insulin lispro (HumALOG/ADMELOG) 100 units/mL subcutaneous injection 1-5 Units, 1-5 Units, Subcutaneous, TID AC, 1 Units at 08/05/24 0905 **AND** Fingerstick Glucose (POCT), , , TID AC, Costa Omer, DPM    insulin lispro  (HumALOG/ADMELOG) 100 units/mL subcutaneous injection 5 Units, 5 Units, Subcutaneous, TID With Meals, Cullen Reinoso MD, 5 Units at 08/05/24 0902    lidocaine (LIDODERM) 5 % patch 1 patch, 1 patch, Topical, Daily, Costa Omer DPM, 1 patch at 08/03/24 0908    metoprolol succinate (TOPROL-XL) 24 hr tablet 25 mg, 25 mg, Oral, Daily, Timothy Rey DO, 25 mg at 08/05/24 0901    ondansetron (ZOFRAN) injection 4 mg, 4 mg, Intravenous, Q6H PRN, Costa Omer DPM    oxyCODONE (ROXICODONE) IR tablet 5 mg, 5 mg, Oral, Q6H PRN, Costa Omer DPM, 5 mg at 08/03/24 2314    pantoprazole (PROTONIX) EC tablet 40 mg, 40 mg, Oral, BID, GRACIELA Ward    sodium bicarbonate tablet 650 mg, 650 mg, Oral, BID after meals, Costa Omer DPM, 650 mg at 08/05/24 0901    ticagrelor (BRILINTA) tablet 90 mg, 90 mg, Oral, Q12H JAVIER, Costa Omer DPM, 90 mg at 08/05/24 0901    torsemide (DEMADEX) tablet 10 mg, 10 mg, Oral, Daily, Costa Omer DPM, 10 mg at 08/05/24 0901    Labs/Data:        Results from last 7 days   Lab Units 08/04/24 0434 08/03/24 2209 08/02/24 0520 08/01/24  0440   WBC Thousand/uL 16.63*  --  25.63* 33.34*   HEMOGLOBIN g/dL 10.1* 10.0* 10.9* 10.5*   HEMATOCRIT % 31.0* 31.9* 34.0* 32.7*   PLATELETS Thousands/uL 283  --  272 303     Results from last 7 days   Lab Units 08/04/24 2012 08/04/24  0434 08/02/24  0520 08/01/24  0440   POTASSIUM mmol/L 4.4 2.9* 3.5 4.1   CHLORIDE mmol/L  --  99 104 107   CO2 mmol/L  --  32 25 26   BUN mg/dL  --  73* 69* 77*   CREATININE mg/dL  --  1.52* 1.88* 2.19*     Dennise Campbell PA-C

## 2024-08-05 NOTE — ASSESSMENT & PLAN NOTE
Acute blood loss anemia from procedures.  Has received 5 units of PRBC since admission.  Hemoglobin stable now    Results from last 7 days   Lab Units 08/04/24  0434 08/03/24  2209 08/02/24  0520 08/01/24  0440   HEMOGLOBIN g/dL 10.1* 10.0* 10.9* 10.5*

## 2024-08-05 NOTE — ASSESSMENT & PLAN NOTE
Shock liver may be related to anemia/hypotension.  Resolving.    Results from last 7 days   Lab Units 08/04/24  0434 08/02/24  0520 08/01/24  0440 07/31/24  0502 07/30/24  1808 07/30/24  0449   AST U/L 52* 362* 814* 2,280* 3,936* 411*   ALT U/L 103* 246* 330* 516* 724* 126*   TOTAL BILIRUBIN mg/dL 0.96 1.19* 1.32* 0.89 0.75 0.67

## 2024-08-05 NOTE — CASE MANAGEMENT
Case Management Discharge Planning Note    Patient name Anamaria Parnell  Location [place] [address] /[address] M* MRN [id number]  : 1945 Date 2024       Current Admission Date: 2024  Current Admission Diagnosis:Aortoiliac occlusive disease (HCC)   Patient Active Problem List    Diagnosis Date Noted Date Diagnosed    Melena 2024     Acute blood loss anemia 2024     Encephalopathy 2024     Elevated troponin 2024     Acute liver failure without hepatic coma 2024     Transaminitis 2024     Pulmonary hypertension (HCC) 2024     Chronic HFrEF (heart failure with reduced ejection fraction) (HCC) 2024     Paroxysmal atrial fibrillation (HCC) 2024     Coronary artery disease of native artery of native heart with stable angina pectoris (HCC) 2024     Cellulitis of toe of right foot 2024     Sepsis (HCC) 2024     Diabetic ulcer of toe of right foot associated with diabetes mellitus due to underlying condition, limited to breakdown of skin (HCC) 2024     Carotid stenosis, asymptomatic, right 10/27/2023     Complex renal cyst 10/18/2023     Encounter for follow-up surveillance of urothelial carcinoma of lower urinary tract 10/18/2023     Encounter to discuss test results 10/17/2023     Smoking 2023     Renal cyst 2023     Primary hypertension 2023     Dysarthria 08/15/2023     Stage 3b chronic kidney disease (HCC)      Aortoiliac occlusive disease (HCC) 10/11/2022     History of gastrointestinal stromal tumor (GIST) 2022     Secondary hyperparathyroidism of renal origin (HCC) 2022     Gastrointestinal stromal tumor (GIST) (HCC) 2022     Type 2 diabetes mellitus, without long-term current use of insulin (HCC) 2021     Type 2 diabetes mellitus with diabetic peripheral angiopathy without gangrene (HCC) 2021     Depression, recurrent (HCC) 2021     Acute renal failure (ARF) (HCC)  11/03/2020     Hypertensive kidney disease with stage 4 chronic kidney disease (HCC) 11/03/2020     Cervical radiculopathy 05/12/2020     Malignant neoplasm of overlapping sites of bladder ([facility]) 04/24/2020     Stenosis of left anterior descending artery Mid LAD 50-60% stenosis 04/01/2020     Right coronary artery occlusion ([facility])total occlusion of proximal RCA with collateral circ 04/01/2020     Pulmonary nodule 7 mm groundglass opacity in the right upper lobe, unchanged since October 2019 03/19/2020     Atherosclerosis of native arteries of right leg with ulceration of other part of foot ([facility]) 03/03/2020     Symptomatic carotid artery stenosis, left 03/03/2020     Femoral artery stenosis, right (HCC) 50-75% stenosis in the common femoral artery. 01/14/2020     Mesenteric artery stenosis ([facility]) 01/14/2020     Positive cardiac stress test  small, mildly severe, partially reversible myocardial perfusion defect of anterior and inferior wall  11/12/2019     Abnormal CT of the chest suspicious for an infectious or inflammatory bronchiolitis. 11/07/2019     Celiac artery stenosis ([facility]) >70% stenosis in the celiac trunk 11/05/2019     Aortoiliac stenosis, left (HCC) 02/25/2019     Spondylosis of lumbar region without myelopathy or radiculopathy 01/29/2019     Lumbosacral spondylosis without myelopathy 01/29/2019     Statin intolerance 01/22/2019     Lumbar disc herniation 01/22/2019     Herniated lumbar intervertebral disc 01/22/2019     Chronic GERD 12/04/2017     Acute renal failure superimposed on stage 3 chronic kidney disease ([facility]) 12/04/2017     Claudication in peripheral vascular disease ([facility]) 12/04/2017     Gout 12/04/2017     Hx-TIA (transient ischemic attack) 12/04/2017     Hyperlipidemia associated with type 2 diabetes mellitus  ([facility]) 12/04/2017     Hypertension associated with diabetes  ([facility]) 12/04/2017     Osteoarthritis 12/04/2017     Right renal artery stenosis ([facility]) 12/04/2017     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     Hyperlipidemia, unspecified 12/04/2017     Vitamin D deficiency 09/08/2016     Basilar artery stenosis 06/22/2016     Type 2 diabetes mellitus with diabetic nephropathy (HCC) 12/19/2015     Hypercholesteremia 12/19/2015     Hypertension 12/19/2015     H/o CVA 11/09/2015       LOS (days): 17  Geometric Mean LOS (GMLOS) (days): 6.5  Days to GMLOS:-10.4     OBJECTIVE:  Risk of Unplanned Readmission Score: 45.52         Current admission status: Inpatient   Preferred Pharmacy:   [facility]/pharmacy #[id number] - [place] - [address]  [address]  [place] [address]  Phone: [phone] Fax: [phone]    Primary Care Provider: Ritchie Lawton MD    [facility]  Secondary Insurance:     DISCHARGE DETAILS:       Additional Comments: CM left message for patient's sonAustin for STR choice. CM to follow.    Saima Nj,

## 2024-08-05 NOTE — CASE MANAGEMENT
Case Management Discharge Planning Note    Patient name Anamaria Parnell  Location [place] [address] /[place] 2 M* MRN [id number]  : 1945 Date 2024       Current Admission Date: 2024  Current Admission Diagnosis:Aortoiliac occlusive disease (HCC)   Patient Active Problem List    Diagnosis Date Noted Date Diagnosed    Melena 2024     Acute blood loss anemia 2024     Encephalopathy 2024     Elevated troponin 2024     Acute liver failure without hepatic coma 2024     Transaminitis 2024     Pulmonary hypertension (HCC) 2024     Chronic HFrEF (heart failure with reduced ejection fraction) (HCC) 2024     Paroxysmal atrial fibrillation (HCC) 2024     Coronary artery disease of native artery of native heart with stable angina pectoris (HCC) 2024     Cellulitis of toe of right foot 2024     Sepsis (HCC) 2024     Diabetic ulcer of toe of right foot associated with diabetes mellitus due to underlying condition, limited to breakdown of skin (HCC) 2024     Carotid stenosis, asymptomatic, right 10/27/2023     Complex renal cyst 10/18/2023     Encounter for follow-up surveillance of urothelial carcinoma of lower urinary tract 10/18/2023     Encounter to discuss test results 10/17/2023     Smoking 2023     Renal cyst 2023     Primary hypertension 2023     Dysarthria 08/15/2023     Stage 3b chronic kidney disease (HCC)      Aortoiliac occlusive disease (HCC) 10/11/2022     History of gastrointestinal stromal tumor (GIST) 2022     Secondary hyperparathyroidism of renal origin (HCC) 2022     Gastrointestinal stromal tumor (GIST) (HCC) 2022     Type 2 diabetes mellitus, without long-term current use of insulin (HCC) 2021     Type 2 diabetes mellitus with diabetic peripheral angiopathy without gangrene (HCC) 2021     Depression, recurrent (HCC) 2021     Acute renal failure (ARF) (HCC)  11/03/2020     Hypertensive kidney disease with stage 4 chronic kidney disease (HCC) 11/03/2020     Cervical radiculopathy 05/12/2020     Malignant neoplasm of overlapping sites of bladder ([facility]) 04/24/2020     Stenosis of left anterior descending artery Mid LAD 50-60% stenosis 04/01/2020     Right coronary artery occlusion ([facility])total occlusion of proximal RCA with collateral circ 04/01/2020     Pulmonary nodule 7 mm groundglass opacity in the right upper lobe, unchanged since October 2019 03/19/2020     Atherosclerosis of native arteries of right leg with ulceration of other part of foot ([facility]) 03/03/2020     Symptomatic carotid artery stenosis, left 03/03/2020     Femoral artery stenosis, right (HCC) 50-75% stenosis in the common femoral artery. 01/14/2020     Mesenteric artery stenosis ([facility]) 01/14/2020     Positive cardiac stress test  small, mildly severe, partially reversible myocardial perfusion defect of anterior and inferior wall  11/12/2019     Abnormal CT of the chest suspicious for an infectious or inflammatory bronchiolitis. 11/07/2019     Celiac artery stenosis ([facility]) >70% stenosis in the celiac trunk 11/05/2019     Aortoiliac stenosis, left (HCC) 02/25/2019     Spondylosis of lumbar region without myelopathy or radiculopathy 01/29/2019     Lumbosacral spondylosis without myelopathy 01/29/2019     Statin intolerance 01/22/2019     Lumbar disc herniation 01/22/2019     Herniated lumbar intervertebral disc 01/22/2019     Chronic GERD 12/04/2017     Acute renal failure superimposed on stage 3 chronic kidney disease ([facility]) 12/04/2017     Claudication in peripheral vascular disease ([facility]) 12/04/2017     Gout 12/04/2017     Hx-TIA (transient ischemic attack) 12/04/2017     Hyperlipidemia associated with type 2 diabetes mellitus  ([facility]) 12/04/2017     Hypertension associated with diabetes  ([facility]) 12/04/2017     Osteoarthritis 12/04/2017     Right renal artery stenosis ([facility]) 12/04/2017     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     Hyperlipidemia, unspecified 12/04/2017     Vitamin D deficiency 09/08/2016     Basilar artery stenosis 06/22/2016     Type 2 diabetes mellitus with diabetic nephropathy (HCC) 12/19/2015     Hypercholesteremia 12/19/2015     Hypertension 12/19/2015     H/o CVA 11/09/2015       LOS (days): 17  Geometric Mean LOS (GMLOS) (days): 6.5  Days to GMLOS:-10.5     OBJECTIVE:  Risk of Unplanned Readmission Score: 45.59         Current admission status: Inpatient   Preferred Pharmacy:   [id number] - [place] - [address]  [address]  [place] [id number]  Phone: [phone] Fax: [phone]    Primary Care Provider: Ritchie Lawton MD    Primary Insurance: [employer] REP  Secondary Insurance:     DISCHARGE DETAILS:     Other Referral/Resources/Interventions Provided:  Interventions: Short Term Rehab  Referral Comments: [facility]      Treatment Team Recommendation: Short Term Rehab  Discharge Destination Plan:: Short Term Rehab     Additional Comments: CM spoke with sonLaurence Avila and patient, family chose STR [facility]. CM reserved in [place]. CM requested numbers for authorization and nursing report and fax. CM to follow for further discharge planning.  CM requested PT/OT IP to visit patient tomorrow to request insurance authorization.      Saima Nj,

## 2024-08-06 ENCOUNTER — DOCUMENTATION (OUTPATIENT)
Dept: VASCULAR SURGERY | Facility: CLINIC | Age: 79
End: 2024-08-06

## 2024-08-06 PROBLEM — I49.5 TACHY-BRADY SYNDROME (HCC): Status: ACTIVE | Noted: 2020-04-15

## 2024-08-06 PROBLEM — Z53.20 PATIENT REFUSED EVALUATION: Status: ACTIVE | Noted: 2024-08-06

## 2024-08-06 PROBLEM — I49.5 TACHY-BRADY SYNDROME (HCC): Status: RESOLVED | Noted: 2020-04-15 | Resolved: 2023-01-30

## 2024-08-06 LAB
GLUCOSE SERPL-MCNC: 133 MG/DL (ref 65–140)
GLUCOSE SERPL-MCNC: 173 MG/DL (ref 65–140)
GLUCOSE SERPL-MCNC: 174 MG/DL (ref 65–140)
GLUCOSE SERPL-MCNC: 99 MG/DL (ref 65–140)

## 2024-08-06 PROCEDURE — 82948 REAGENT STRIP/BLOOD GLUCOSE: CPT

## 2024-08-06 PROCEDURE — NC001 PR NO CHARGE: Performed by: PODIATRIST

## 2024-08-06 PROCEDURE — 99232 SBSQ HOSP IP/OBS MODERATE 35: CPT | Performed by: INTERNAL MEDICINE

## 2024-08-06 PROCEDURE — 97530 THERAPEUTIC ACTIVITIES: CPT

## 2024-08-06 PROCEDURE — 97535 SELF CARE MNGMENT TRAINING: CPT

## 2024-08-06 PROCEDURE — 97110 THERAPEUTIC EXERCISES: CPT

## 2024-08-06 PROCEDURE — 99233 SBSQ HOSP IP/OBS HIGH 50: CPT

## 2024-08-06 PROCEDURE — 99024 POSTOP FOLLOW-UP VISIT: CPT | Performed by: SURGERY

## 2024-08-06 RX ADMIN — ONDANSETRON 4 MG: 2 INJECTION INTRAMUSCULAR; INTRAVENOUS at 12:09

## 2024-08-06 RX ADMIN — METOPROLOL SUCCINATE 25 MG: 25 TABLET, EXTENDED RELEASE ORAL at 08:04

## 2024-08-06 RX ADMIN — APIXABAN 5 MG: 5 TABLET, FILM COATED ORAL at 16:16

## 2024-08-06 RX ADMIN — ESCITALOPRAM OXALATE 20 MG: 20 TABLET ORAL at 08:04

## 2024-08-06 RX ADMIN — SODIUM BICARBONATE 650 MG TABLET 650 MG: at 08:04

## 2024-08-06 RX ADMIN — INSULIN LISPRO 5 UNITS: 100 INJECTION, SOLUTION INTRAVENOUS; SUBCUTANEOUS at 12:09

## 2024-08-06 RX ADMIN — TICAGRELOR 90 MG: 90 TABLET ORAL at 21:16

## 2024-08-06 RX ADMIN — INSULIN LISPRO 5 UNITS: 100 INJECTION, SOLUTION INTRAVENOUS; SUBCUTANEOUS at 16:16

## 2024-08-06 RX ADMIN — DEXTROSE 1000 MG: 50 INJECTION, SOLUTION INTRAVENOUS at 01:58

## 2024-08-06 RX ADMIN — INSULIN LISPRO 5 UNITS: 100 INJECTION, SOLUTION INTRAVENOUS; SUBCUTANEOUS at 08:04

## 2024-08-06 RX ADMIN — OXYCODONE HYDROCHLORIDE 5 MG: 5 TABLET ORAL at 12:08

## 2024-08-06 RX ADMIN — PANTOPRAZOLE SODIUM 40 MG: 40 TABLET, DELAYED RELEASE ORAL at 21:16

## 2024-08-06 RX ADMIN — INSULIN DETEMIR 10 UNITS: 100 INJECTION, SOLUTION SUBCUTANEOUS at 08:04

## 2024-08-06 RX ADMIN — SODIUM BICARBONATE 650 MG TABLET 650 MG: at 16:17

## 2024-08-06 RX ADMIN — INSULIN LISPRO 1 UNITS: 100 INJECTION, SOLUTION INTRAVENOUS; SUBCUTANEOUS at 08:04

## 2024-08-06 RX ADMIN — PANTOPRAZOLE SODIUM 40 MG: 40 TABLET, DELAYED RELEASE ORAL at 08:04

## 2024-08-06 RX ADMIN — ALLOPURINOL 100 MG: 100 TABLET ORAL at 08:04

## 2024-08-06 RX ADMIN — APIXABAN 5 MG: 5 TABLET, FILM COATED ORAL at 08:04

## 2024-08-06 RX ADMIN — INSULIN LISPRO 1 UNITS: 100 INJECTION, SOLUTION INTRAVENOUS; SUBCUTANEOUS at 12:09

## 2024-08-06 RX ADMIN — TICAGRELOR 90 MG: 90 TABLET ORAL at 08:04

## 2024-08-06 RX ADMIN — TORSEMIDE 10 MG: 10 TABLET ORAL at 08:04

## 2024-08-06 NOTE — PLAN OF CARE
Problem: SAFETY ADULT  Goal: Patient will remain free of falls  Description: INTERVENTIONS:  - Educate patient/family on patient safety including physical limitations  - Instruct patient to call for assistance with activity   - Consult OT/PT to assist with strengthening/mobility   - Keep Call bell within reach  - Keep bed low and locked with side rails adjusted as appropriate  - Keep care items and personal belongings within reach  - Initiate and maintain comfort rounds  - Make Fall Risk Sign visible to staff  - Offer Toileting every 2 Hours, in advance of need  - Initiate/Maintain bed alarm  - Apply yellow socks and bracelet for high fall risk patients  - Consider moving patient to room near nurses station  Outcome: Progressing  Goal: Maintain or return to baseline ADL function  Description: INTERVENTIONS:  -  Assess patient's ability to carry out ADLs; assess patient's baseline for ADL function and identify physical deficits which impact ability to perform ADLs (bathing, care of mouth/teeth, toileting, grooming, dressing, etc.)  - Assess/evaluate cause of self-care deficits   - Assess range of motion  - Assess patient's mobility; develop plan if impaired  - Assess patient's need for assistive devices and provide as appropriate  - Encourage maximum independence but intervene and supervise when necessary  - Involve family in performance of ADLs  - Assess for home care needs following discharge   - Consider OT consult to assist with ADL evaluation and planning for discharge  - Provide patient education as appropriate  Outcome: Progressing  Goal: Maintains/Returns to pre admission functional level  Description: INTERVENTIONS:  - Perform AM-PAC 6 Click Basic Mobility/ Daily Activity assessment daily.  - Set and communicate daily mobility goal to care team and patient/family/caregiver.   - Collaborate with rehabilitation services on mobility goals if consulted  - Stand patient 3 times a day  - Ambulate patient 3 times  a day  - Out of bed to chair 3 times a day   - Out of bed for meals 3 times a day  - Out of bed for toileting  - Record patient progress and toleration of activity level   Outcome: Progressing

## 2024-08-06 NOTE — OCCUPATIONAL THERAPY NOTE
Occupational Therapy Progress Note     Patient Name: Anamaria Parnell  Today's Date: 8/6/2024  Problem List  Principal Problem:    Aortoiliac occlusive disease (HCC)  Active Problems:    Basilar artery stenosis    H/o CVA    Tachy-jeff syndrome (HCC)    Acute renal failure (ARF) (HCC)    Depression, recurrent (HCC)    Type 2 diabetes mellitus, without long-term current use of insulin (HCC)    Primary hypertension    Cellulitis of toe of right foot    Coronary artery disease of native artery of native heart with stable angina pectoris (HCC)    Paroxysmal atrial fibrillation (HCC)    Chronic HFrEF, LVEF 30%, LVIDd 4.6 cm, AHA Stage C    Encephalopathy    Elevated troponin    Acute liver failure without hepatic coma    Acute blood loss anemia    Melena          08/06/24 1110   OT Last Visit   OT Visit Date 08/06/24   Note Type   Note Type Treatment   Pain Assessment   Pain Assessment Tool 0-10   Pain Score 7   Pain Location/Orientation Location: Abdomen   Restrictions/Precautions   Weight Bearing Precautions Per Order Yes   RLE Weight Bearing Per Order NWB   Other Precautions Cognitive;Chair Alarm;Bed Alarm;Multiple lines;Fall Risk;Pain  (wound vac)   ADL   Eating Assistance 5  Supervision/Setup   UB Dressing Assistance 4  Minimal Assistance   LB Dressing Assistance 2  Maximal Assistance   LB Dressing Deficit Thread LLE into pants   Toileting Assistance  1  Total Assistance   Toileting Deficit Setup;Verbal cueing;Supervison/safety;Increased time to complete;Perineal hygiene;Use of bedpan/urinal setup   Bed Mobility   Rolling R 3  Moderate assistance   Additional items Assist x 1;HOB elevated;Bedrails;Increased time required;Verbal cues   Rolling L 3  Moderate assistance   Additional items Assist x 1;HOB elevated;Bedrails;Increased time required;Verbal cues   Supine to Sit 3  Moderate assistance   Additional items Assist x 1;HOB elevated;Bedrails;Increased time required;Verbal cues;LE management   Sit to Supine 2   Maximal assistance   Additional items Assist x 2;Increased time required;Verbal cues;LE management   Transfers   Sliding Board transfer 2  Maximal assistance   Additional items Assist x 1;Assist x 2   Additional Comments EOB<> WC   Cognition   Overall Cognitive Status Impaired   Arousal/Participation Cooperative   Attention Attends with cues to redirect   Orientation Level Oriented to person;Oriented to place;Disoriented to time;Disoriented to situation   Memory Decreased recall of precautions;Decreased recall of recent events;Decreased short term memory   Following Commands Follows one step commands with increased time or repetition   Activity Tolerance   Activity Tolerance Patient limited by fatigue;Patient limited by pain   Medical Staff Made Aware RN, PTA   Assessment   Assessment Pt seen for skilled OT tx this date. Tx focused on improving strength, activity tolerance and balance, safety awareness to increase independence with self care tasks. Pt tolerated session fair. Pt was limited by weakness, pain. Greeted in supine.  Pt performed LB dressing / bathing maxA , bed mobility supine> sit maxAx1, sit> supine maxA  transfers maxAx1-2 slide board. Pt demonstrated fair - sitting balance during functional tasks. No LOB Noted. Non compliant with NWB. Pt demonstrated ability to safely and appropriately attend to all tasks during session. Pt required moderate verbal cuing during session to safely complete tasks. Vitals:145/65. Current OT DC recommendations for pt is level 2 resources.   Plan   Treatment Interventions ADL retraining;Functional transfer training;UE strengthening/ROM;Endurance training;Patient/family training;Equipment evaluation/education;Compensatory technique education;Continued evaluation;Energy conservation   Goal Expiration Date 08/14/24   OT Treatment Day 6   OT Frequency 3-5x/wk   Discharge Recommendation   Rehab Resource Intensity Level, OT II (Moderate Resource Intensity)   Additional Comments   The patient's raw score on the AM-PAC Daily Activity Inpatient Short Form is 13. A raw score of less than 19 suggests the patient may benefit from discharge to post-acute rehabilitation services. Please refer to the recommendation of the Occupational Therapist for safe discharge planning.   AM-PAC Daily Activity Inpatient   Lower Body Dressing 1   Bathing 1   Toileting 3   Upper Body Dressing 1   Grooming 2   Eating 4   Daily Activity Raw Score 12   Daily Activity Standardized Score (Calc for Raw Score >=11) 30.6   AM-PAC Applied Cognition Inpatient   Following a Speech/Presentation 3   Understanding Ordinary Conversation 3   Taking Medications 1   Remembering Where Things Are Placed or Put Away 2   Remembering List of 4-5 Errands 2   Taking Care of Complicated Tasks 2   Applied Cognition Raw Score 13   Applied Cognition Standardized Score 30.46     [place], OT

## 2024-08-06 NOTE — ASSESSMENT & PLAN NOTE
Shock liver related to anemia/hypotension.  Resolving.    Results from last 7 days   Lab Units 08/04/24  0434 08/02/24  0520 08/01/24  0440 07/31/24  0502 07/30/24  1808   AST U/L 52* 362* 814* 2,280* 3,936*   ALT U/L 103* 246* 330* 516* 724*   TOTAL BILIRUBIN mg/dL 0.96 1.19* 1.32* 0.89 0.75

## 2024-08-06 NOTE — PROGRESS NOTES
Formerly [facility]  Progress Note  Name: Anamaria Parnell I  MRN: [id number]  Unit/Bed#: [place] [address] -01 I Date of Admission: 7/19/2024   Date of Service: 8/6/2024 I Hospital Day: 18    Assessment & Plan   * Aortoiliac occlusive disease (HCC)  Assessment & Plan  History of PAD CAD diabetes and hypertension presented to [facility] for right great toe gangrene transferred here for vascular bypass.  This admission: Status post bilateral femoral endarterectomy with bypass and JACI/EIA stenting  VAC still present.  Continue aspirin and Brilinta.  Gangrene of toe/cellulitis.  Status post first ray amputation.  Completed course of ceftriaxone per ID.  No further vascular/podiatry interventions anticipated.  Disposition: To rehab    Patient refused evaluation  Assessment & Plan  Reported melena but patient has been refusing blood draws.  Unknown hemoglobin or electrolytes.    Acute blood loss anemia  Assessment & Plan  Acute blood loss anemia from procedures.  Has received 5 units of PRBC since admission.  Hemoglobin stable with last available blood draw, patient has been refusing labs.    Acute liver failure without hepatic coma  Assessment & Plan  Shock liver related to anemia/hypotension.  Resolving.    Results from last 7 days   Lab Units 08/04/24  0434 08/02/24  0520 08/01/24  0440 07/31/24  0502 07/30/24  1808   AST U/L 52* 362* 814* 2,280* 3,936*   ALT U/L 103* 246* 330* 516* 724*   TOTAL BILIRUBIN mg/dL 0.96 1.19* 1.32* 0.89 0.75       Chronic HFrEF, LVEF 30%, LVIDd 4.6 cm, AHA Stage C  Assessment & Plan  Compensated.  Continue torsemide    Paroxysmal atrial fibrillation (HCC)  Assessment & Plan  New onset atrial fibrillation this admission multifactorial from surgery/anemia.  Did have bradycardia thought secondary to high-dose metoprolol.  No further episodes.  No need for pacemaker.  Continue metoprolol 25 mg daily.  Anticoagulation: Cardiology started Eliquis.    Coronary artery disease of  native artery of native heart with stable angina pectoris ([facility])  Assessment & Plan  CAD with history of PCI.  No chest pain.  On DAPT  Cardiology was continue aspirin and brilinta.    Primary hypertension  Assessment & Plan  Blood pressures are stable.  Off amlodipine and clonidine.  Labetalol changed to metoprolol by cardiology    Type 2 diabetes mellitus, without long-term current use of insulin ([facility])  Assessment & Plan  Lab Results   Component Value Date    HGBA1C 5.9 (H) 07/09/2024     Recent Labs     08/05/24  1600 08/05/24  2110 08/06/24  0721 08/06/24  1128   POCGLU 185* 164* 173* 174*     Stable continue levemir 10 units and lispro 5 units 3 times daily.    Depression, recurrent ([facility])  Assessment & Plan  Mood stable continue escitalopram    Acute renal failure (ARF) ([facility])  Assessment & Plan  Acute kidney injury creatinine 2.37 secondary to sepsis.    Resolved.    Results from last 7 days   Lab Units 08/04/24  0434 08/02/24  0520 08/01/24  0440 07/31/24  0502 07/30/24  1808   BUN mg/dL 73* 69* 77* 77* 70*   CREATININE mg/dL 1.52* 1.88* 2.19* 2.37* 2.17*   EGFR ml/min/1.73sq m 32 25 21 19 21       VTE Pharmacologic Prophylaxis: VTE Score: 3 Moderate Risk (Score 3-4) - Pharmacological DVT Prophylaxis Ordered: apixaban (Eliquis).    Mobility:  Basic Mobility Inpatient Raw Score: 8  JH-HLM Goal: 3: Sit at edge of bed  JH-HLM Achieved: 2: Bed activities/Dependent transfer  JH-HLM Goal NOT achieved. Continue with multidisciplinary rounding and encourage appropriate mobility to improve upon JH-HLM goals.    Patient Centered Rounds: I have performed bedside rounds with nursing staff today.  Discussions with Specialists or Other Care Team Provider: Case management geriatrics    Education and Discussions with Family / Patient:     Time Spent for Care:   This time was spent on one or more of the following: performing physical exam; counseling and coordination of care; obtaining or reviewing history; documenting in the  "medical record; reviewing/ordering tests, medications or procedures; communicating with other healthcare professionals and discussing with patient's family/caregivers.    Current Length of Stay: 18 day(s)  Current Patient Status: Inpatient   Certification Statement: The patient will continue to require additional inpatient hospital stay due to placement  Discharge Plan: Anticipate discharge in 24-48 hrs to rehab facility.    Code Status: Level 3 - DNAR and DNI      Subjective:   Patient seen and examined.  No new complaints.  Pain controlled.    Objective:   Vitals: Blood pressure 145/65, pulse 93, temperature (!) 97.3 °F (36.3 °C), temperature source Oral, resp. rate 20, height 4' 10\" (1.473 m), weight 65 kg (143 lb 4.8 oz), SpO2 99%.    Intake/Output Summary (Last 24 hours) at 8/6/2024 1549  Last data filed at 8/6/2024 0922  Gross per 24 hour   Intake 840 ml   Output --   Net 840 ml       Physical Exam  Vitals reviewed.   Constitutional:       General: She is not in acute distress.     Appearance: Normal appearance.   HENT:      Head: Atraumatic.   Eyes:      Extraocular Movements: Extraocular movements intact.   Cardiovascular:      Rate and Rhythm: Regular rhythm.   Pulmonary:      Breath sounds: Decreased breath sounds present. No wheezing.   Abdominal:      General: Bowel sounds are normal.      Palpations: Abdomen is soft.      Tenderness: There is no guarding or rebound.   Musculoskeletal:         General: Deformity (Right foot wrapped) present. No swelling.   Skin:     General: Skin is warm.   Neurological:      General: No focal deficit present.      Mental Status: She is alert.   Psychiatric:         Mood and Affect: Mood normal.       Additional Data:   Labs:  Results from last 7 days   Lab Units 08/04/24  0434 08/03/24  2209 08/02/24  0520 08/01/24  0440 07/31/24  1715 07/31/24  1111 07/31/24  0502 07/30/24  2202   WBC Thousand/uL 16.63*  --  25.63* 33.34*  --   --  31.56*  --    HEMOGLOBIN g/dL 10.1* " 10.0* 10.9* 10.5*   < >  --  7.4*  --    PLATELETS Thousands/uL 283  --  272 303  --   --  318  --    MCV fL 97  --  92 95  --   --  99*  --    TOTAL NEUT ABS Thousand/uL  --   --   --   --   --   --  27.46*  --    BANDS PCT %  --   --   --   --   --   --  4  --    INR   --   --   --   --   --  1.70*  --  2.10*    < > = values in this interval not displayed.     Results from last 7 days   Lab Units 08/04/24 2012 08/04/24 0434 08/02/24 0520 08/01/24  0440   SODIUM mmol/L  --  141 141 143   POTASSIUM mmol/L 4.4 2.9* 3.5 4.1   CHLORIDE mmol/L  --  99 104 107   CO2 mmol/L  --  32 25 26   ANION GAP mmol/L  --  10 12 10   BUN mg/dL  --  73* 69* 77*   CREATININE mg/dL  --  1.52* 1.88* 2.19*   CALCIUM mg/dL  --  8.4 8.4 8.6   ALBUMIN g/dL  --  3.0* 3.4* 3.6   TOTAL BILIRUBIN mg/dL  --  0.96 1.19* 1.32*   ALK PHOS U/L  --  120* 143* 139*   ALT U/L  --  103* 246* 330*   AST U/L  --  52* 362* 814*   EGFR ml/min/1.73sq m  --  32 25 21   GLUCOSE RANDOM mg/dL  --  135 193* 231*     Results from last 7 days   Lab Units 08/04/24 0434 08/02/24 0520 08/01/24 0440 07/31/24  0502   MAGNESIUM mg/dL 1.8* 2.1 2.3 2.4   PHOSPHORUS mg/dL 3.3 3.9 4.3* 5.0*                  Results from last 7 days   Lab Units 07/31/24 0502 07/31/24  0121 07/30/24  2202   LACTIC ACID mmol/L 1.9 2.4* 4.6*     Results from last 7 days   Lab Units 08/06/24  1128 08/06/24  0721 08/05/24  2110 08/05/24  1600 08/05/24  1054 08/05/24  0716 08/04/24  2117 08/04/24  1603 08/04/24  1203 08/04/24  0713 08/03/24  2114 08/03/24  1619   POC GLUCOSE mg/dl 174* 173* 164* 185* 191* 204* 243* 269* 188* 168* 168* 304*             * I Have Reviewed All Lab Data Listed Above.    Recent cultures:             Results from last 7 days   Lab Units 08/03/24  2236   FECAL OCCULT BLOOD DIAGNOSTIC  Positive*   FECAL OCCULT BLOOD DIAGNOSTIC 2  Positive*       Lines/Drains:  Invasive Devices       Peripheral Intravenous Line  Duration             Peripheral IV 07/31/24 Right  Antecubital 6 days    Peripheral IV 08/04/24 Left;Upper;Ventral (anterior) Arm 2 days                      Telemetry:  Telemetry Orders (From admission, onward)               24 Hour Telemetry Monitoring  Continuous x 24 Hours (Telem)        Question:  Reason for 24 Hour Telemetry  Answer:  Arrhythmias requiring acute medical intervention / PPM or ICD malfunction                     Telemetry Reviewed:   Indication for Continued Telemetry Use:              Telemetry:   Telemetry Orders (From admission, onward)               24 Hour Telemetry Monitoring  Continuous x 24 Hours (Telem)        Question:  Reason for 24 Hour Telemetry  Answer:  Arrhythmias requiring acute medical intervention / PPM or ICD malfunction                      Imaging:  Imaging Reports Reviewed Today Include:   XR chest portable ICU    Result Date: 7/24/2024  Impression: ET tube 4 cm above the roberta. Minimal atelectasis in the right midlung. Workstation performed: [id number]     XR chest portable    Result Date: 7/23/2024  Impression: Interval development of pulmonary congestion. Small bilateral effusions suspected. Workstation performed: [id number]     XR foot 3+ vw right    Result Date: 7/22/2024  Impression: Cortical destruction along the ventral aspect of the first distal phalanx worrisome for acute osteomyelitis. Large air within the overlying soft tissues. The study was marked in EPIC for immediate notification. Workstation performed: [id number]       Scheduled Meds:  Current Facility-Administered Medications   Medication Dose Route Frequency Provider Last Rate    allopurinol  100 mg Oral Daily Costa Omer DPM      apixaban  5 mg Oral BID Dennise Campbell PA-C      escitalopram  20 mg Oral Daily Costa Omer DPM      HYDROmorphone  0.2 mg Intravenous Q6H PRN Costa Omer DPM      insulin detemir  10 Units Subcutaneous Daily Cullen Reinoso MD      insulin lispro  1-5 Units Subcutaneous TID AC Costa Omer  DPM      insulin lispro  5 Units Subcutaneous TID With Meals Cullen Reinoso MD      lidocaine  1 patch Topical Daily Costa Omer, APRILM      metoprolol succinate  25 mg Oral Daily Timothy Rey DO      ondansetron  4 mg Intravenous Q6H PRN Costa Omer, DPM      oxyCODONE  5 mg Oral Q6H PRN Costa Omer, APRILM      pantoprazole  40 mg Oral BID GRACIELA Ward      sodium bicarbonate  650 mg Oral BID after meals Costa Omer, KARMA      ticagrelor  90 mg Oral Q12H JAVIER Costa Omer, APRILM      torsemide  10 mg Oral Daily Costa Omer DPM         Today, Patient Was Seen By: Akira Flores DO    ** Please Note: Dictation voice to text software may have been used in the creation of this document. **

## 2024-08-06 NOTE — ASSESSMENT & PLAN NOTE
New onset atrial fibrillation this admission multifactorial from surgery/anemia.  Did have bradycardia thought secondary to high-dose metoprolol.  No further episodes.  No need for pacemaker.  Continue metoprolol 25 mg daily.  Anticoagulation: Cardiology started Eliquis.

## 2024-08-06 NOTE — PLAN OF CARE
Problem: PHYSICAL THERAPY ADULT  Goal: Performs mobility at highest level of function for planned discharge setting.  See evaluation for individualized goals.  Description: Treatment/Interventions: Functional transfer training, LE strengthening/ROM, Therapeutic exercise, Patient/family training, Equipment eval/education, Bed mobility, Cognitive reorientation, Gait training, Compensatory technique education, Continued evaluation, Spoke to nursing, OT          See flowsheet documentation for full assessment, interventions and recommendations.  Outcome: Progressing  Note: Prognosis: Fair  Problem List: Decreased strength, Decreased endurance, Impaired balance, Decreased mobility, Decreased cognition, Impaired judgement, Decreased safety awareness, Obesity, Decreased skin integrity, Orthopedic restrictions, Pain (wbs)  Assessment: Pt seen for PT treatment session this date with interventions consisting of bed mobility, transfer training, and HEP, and education provided as needed for safety and direction to improve functional mobility, safety awareness, and activity tolerance. Pt agreeable to PT treatment session upon arrival, pt found out of bed in w/c . At end of session, pt left  supine in bed with all needs in reach. In comparison to previous session, pt with no improvement activity tolerance, endurance, standing balance, ambulation distances, ambulatory balance, AM- pac score, and functional mobility. Pt  continues to demonstrates severely impaired ability to perform SB transfers from w/c to bed requiring maximal assistance x2 to perform and complete.  Total assist to place and remove SB from under pt.  Pt  requires verbal cues for transfer technique and proper hand placement.  Pt is limited by decreased overall strength, balance, endurance, activity tolerance and limitations in WBS of R le. Pt  performs supine aa-arom to b/l le's  x 10 reps.  To tolerance.pt  will continue to benefit from PT services to maximize  functional mobility and outcomes.   Continue to recommend  level II moderate rehab resource intensity  at time of d/c in order to maximize pt's functional independence and safety w/ mobility. Pt continues to be functioning below baseline level. PT will continue to see pt while here in order to address the deficits listed above and provide interventions consistent w/ POC in effort to achieve STGs.    The patient's AM-PAC Basic Mobility Inpatient Short Form Raw Score is 8. A raw score less than 16 suggests the patient may benefit from discharge to post-acute rehabilitation services. Please also refer to the recommendation of the Physical Therapist for safe discharge planning.  Barriers to Discharge: Decreased caregiver support, Inaccessible home environment  Barriers to Discharge Comments: son reports pt has to be indep to return home as he cannot provide complete care for her  Rehab Resource Intensity Level, PT: II (Moderate Resource Intensity)    See flowsheet documentation for full assessment.

## 2024-08-06 NOTE — PROGRESS NOTES
Progress Note - Infectious Disease   Anamaria Parnell 79 y.o. female MRN: [id number]  Unit/Bed#: [place] [address] -[id number] Encounter: [id number]    Impression/Plan:  1. Leukocytosis. Likely reactive in setting of critical limb ischemia. Also consider role of R foot infection. Operative culture showing morganella. No other clear source appreciated. Single set of blood cultures was negative. She's remained afebrile and her WBC count has trended down. She has completed appropriate course of antibiotic treatment.  -monitor patient off antibiotics  -monitor CBCD  -monitor vitals     2. R hallux dry gangrene. In setting of severe PAD with critical limb ischemia and DMII. Patient underwent a course of IV cefazolin prior to surgical intervention. Podiatry took patient to the OR on 7/29/2024 for R hallux amputation and partial 1st ray resection. Wound is packed open and will likely require definitive closure once patient is medically stable. Betadine dressings to continue for now. Tissue culture with growth of morganella. The patient completed 7-days of post op treatment. No indication for ongoing antibiotic at this time.  -monitor patient off antibiotics  -monitor CBCD and BMP  -monitor vitals  -serial R foot exams  -surgical site care per podiatry  -continue follow up with podiatry      3. PAD. LEAD from 7/16/2024 with severe B/L LE disease through the femoral-popliteal arteries with high grade stenosis vs occlusion. Vascular surgery has been following closely. Patient is s/p bilateral femoral endarterectomy with left to right femorofemoral PTFE bypass and retrograde left JACI, left EIA stenting, and bilateral groin VAC placement on 7/24/2024. She will need definitive R sided fem-pop bypass but this has been postponed for 2 weeks as patient does not feels she's up for surgery right now.  -serial B/L LE exams  -anticipate Fem-Pop bypass later this month  -VAC care per vascular surgery  -continue close follow up with vascular surgery  "     4. Type 2 diabetes mellitus without long term insulin use. Patient's last HbA1c was 5.9% on 7/9/2024. Elevated blood glucose is risk factor for infection.   -recommend tight glycemic control  -blood glucose management per primary service     5. RICARDO. On CKD stage 3. This impacts antibiotic dosing. Upon review of patient's available medical records it appears her baseline creatinine is approximately 1.3-1.8. Creatinine upon admission was 2.19. Likely prerenal in setting of acute foot infection. Consider role of renal artery stenosis. Nephrology is following. Creatinine had trended down.  -monitor creatinine  -dose adjust antibiotics for renal function as needed  -avoid nephrotoxins  -volume management per primary service/nephrology  -continue follow up with nephrology     6. Possible shock liver. Patient with development of elevated LFTs, ammonia, troponin, and lactic acid, along with episodes of hypotension earlier this week. She has been given albumin and PRBC. Cardiology is following. Gastroenterology is following. RUQ ultrasound with dopplers showed a dilated CBD. Portal venous flow was hepatopetal.  -monitor CBCD and CMP  -transfusion support per primary service  -dose adjust antibiotics for hepatic function as needed  -serial abdominal exams  -monitor GI symptoms  -continue follow up with cardiology  -continue follow up with gastroenterology    The infectious disease team will sign off. Please call with new questions or concerns.  Above plan was discussed in detail with patient at the bedside.  Above plan was discussed in detail with NICOLLE who agrees with plan to monitor patient off antibiotics.     Antibiotics:  Patient has completed antibiotic treatment.    Subjective:  Patient reports she's about the same today. She is focused on getting out of the hospital, feels her time here has \"just been too much.\" She has no fever, chills, sweats, shakes; no nausea, vomiting, abdominal pain; no cough, shortness of " breath, chest pain. Denies pain in her B/L groins and has no concern with the VAC dressings. She has no concern with her R foot dressing. No new symptoms.    Objective:  Vitals:  Temp:  [97.2 °F (36.2 °C)-97.8 °F (36.6 °C)] 97.5 °F (36.4 °C)  HR:  [77-97] 97  Resp:  [18-20] 18  BP: (137-152)/(57-65) 137/65  SpO2:  [97 %-100 %] 97 %  Temp (24hrs), Av.5 °F (36.4 °C), Min:97.2 °F (36.2 °C), Max:97.8 °F (36.6 °C)  Current: Temperature: 97.5 °F (36.4 °C)    Physical Exam:   General Appearance:  Alert, interactive, nontoxic, no acute distress. She appears depressed. She appears chronically weak and debilitated.   Throat: Oropharynx moist without lesions.    Lungs:   Clear to auscultation bilaterally; no wheezes, rhonchi or rales; respirations unlabored.   Heart:  Tachycardic, irregular; no murmur, rub or gallop.   Abdomen:   Soft, non-tender, non-distended, positive bowel sounds. B/L groin VAC dressing intact.    Extremities: R foot clean and intact.   Skin: No new rashes noted on exposed skin.     Labs, Imaging, & Other studies:   All pertinent labs and imaging studies were personally reviewed  Results from last 7 days   Lab Units 24  0440   WBC Thousand/uL 16.63*  --  25.63* 33.34*   HEMOGLOBIN g/dL 10.1* 10.0* 10.9* 10.5*   PLATELETS Thousands/uL 283  --  272 303     Results from last 7 days   Lab Units 24  0520 24  0440   POTASSIUM mmol/L 4.4 2.9* 3.5 4.1   CHLORIDE mmol/L  --  99 104 107   CO2 mmol/L  --  32 25 26   BUN mg/dL  --  73* 69* 77*   CREATININE mg/dL  --  1.52* 1.88* 2.19*   EGFR ml/min/1.73sq m  --  32 25 21   CALCIUM mg/dL  --  8.4 8.4 8.6   AST U/L  --  52* 362* 814*   ALT U/L  --  103* 246* 330*   ALK PHOS U/L  --  120* 143* 139*

## 2024-08-06 NOTE — PROGRESS NOTES
Progress Note - Geriatric Medicine   Anamaria Parnell 79 y.o. 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1 on 8/4  Patient is alert and awake on my exam today but she does appear to be more confused and disoriented today   She is oriented to person and place on my exam today  Identify and treat reversible causes of confusion including infection, dehydration, electrolyte imbalance, anemia, hypoxia, urinary retention, constipation, pain, and sleep disturbance  Maintain delirium precautions   Provide redirection, reorientation, and distraction techniques  Maintain fall and safety precautions   Assist with ADLs/IADLs  Avoid deliriogenic medications such as tramadol, benzodiazepines, anticholinergics, benadryl  Treat pain using geriatric pain protocol   Encourage oral hydration and nutrition   Monitor for constipation and urinary retention   Implement sleep hygiene and limit night time  interuptions   Maintain sleep-wake cycle   Encourage early and frequent mobilization   Most recent EKG on 7/30/2024 revealed a QTc interval of 525  Medication at this time is extremely limited for acute agitation and behaviors   Would avoid antipsychotics for acute agitation and behaviors secondary to prolonged QTc interval   Would also avoid Depakote due to acute liver failure   While not typically recommended in geriatric patients can consider low dose lorazepam 0.25 mg if needed for acute agitation and behaviors as other options are not feasible at this time   Redirect and reorient as needed  Keep mentally, physically, and socially active    Cognitive Screening   Per chart review, it appears that the patient has a prior history of memory loss, however, I do not see any workup for this diagnosis and it does not appear as a documented diagnosis in [facility]  Patients son notes the patient is alert and oriented x 4 at baseline and has no issues or difficulties with her memory   She is noted to be more confused on exam today   She is oriented only to person and place today   Most recent TSH on 7/27/2024 noted to be 2.309  Most recent vitamin B 12 level on 2/25/2019 noted to be 502  Consider checking on routine labs   CT of the head on 7/30/2024 revealed chronic left PCA infarct and microangiopathic changes   No MoCA or cognitive testing noted in epic  Maintain delirium precautions as discussed below  Redirect and reorient as needed  Keep physically, mentally, and socially active     Deconditioning   Patient is at increased risk for deconditioning secondary to right hallux gangrene status post right partial first ray amputation, status post bilateral femoral endarterectomy with left to right femorofemoral PTFE bypass and retrograde left JACI, left EIA stenting, and bilateral groin VAC placement, anesthesia, acute pain, acute blood loss anemia (hemoglobin improving on labs from 8/4), weakness, gait dysfunction, and  prolonged hospitalization   Continue to optimize diet, hydration, and mobility for healing   Stage IV Chronic Kidney Disease   Baseline creatinine appears to be 1.2-1.8  Patient now with RICARDO which appears to have resolved (creatinine 1.52 on 8/4)  Patient does follow with neurology in the outpatient setting   Nephrology had been consulted but has since signed off   Avoid nephrotoxic medication and renal dose medication   Keep hydrated   PO intake  Patient notes she has a decreased appetite   She notes that she used to have a very good appetite when she was on insulin but since being transitioned to oral tradjenta her appetite has decreased   She does drink carnation breakfast drinks at home  She is receiving glucerna with meals here  Encourage oral hydration and nutrition   Congestive heart failure   Most recent echo on 7/21/2024 revealed an EF of 41%  Patient does not appear to be on any diuretics at baseline  She is currently on torsemide 10 mg daily though this was held on 7/30 and 7/31 due to low blood pressures   Cardiology is consulted and following  Recommend low-sodium diet  Continue to monitor weights and I&O  Management per cardiology  Type II Diabetes   Hemoglobin A1c on 7/9/2024 noted to be 5.9 which is pretty tightly controlled for patient's age  Patient has been on insulin in the past but once her A1c was improved she was transitioned over to Tradjenta which she and her son note have affected her appetite  Patient is currently on blood sugar checks and while blood sugars have been elevated they are acceptable  Her current regimen is Humalog 5 units + SSI with meals and Levemir 10 units daily  Continue insulin and diabetic diet  Avoid hypoglycemia  Acute Blood Loss Anemia  Baseline hemoglobin appears to be 11-13  She has been primarily trending in the 7's for the past several days  She was noted to have a hemoglobin of 6.4 on 7/30 and received a unit of blood  Her hemoglobin on 7/31 was 7.4 and she  received another unit of blood  Hemoglobin on 8/4 stable at 10.1  Continue to monitor CBC  Transfuse for hemoglobin < 7   Monitor for signs and symptoms of infection, dehydration, DVT, and skin breakdown    Frailty   Clinical Frail Scale: 5- Mildly Frail (baseline)  More evident slowing, needs help high order IADLs (transport, bills, medications)  Progressively impairs shopping and walking outside alone, meal prep and housework  Clinical Frail Scale: 6- Moderately Frail (current)  Need help with all outside activities  Need help with stairs and bathing  May need assistance with dressing  Most recent albumin on 8/4 noted to be 3.0  Consider nutrition consult  Encourage protein supplementation     Ambulatory Dysfunction/Falls  Patient has not had any recent falls at home  She notes she has both a roller walker and cane at home  Her son notes that she has an electric scooter that she primarily uses and then will supplement with the cane when ambulating   PT/OT consulted to assist with strengthening/mobility and assist with discharge planning to appropriate level of care  Assess patient frequently for physical needs, encourage use of assistant devices as needed and directed by PT/OT  Identify cognitive and physical deficits and behaviors that affect risk of falls  Consider moving patient closer to nursing station to monitor more closely for impulsive behavior which may increase risk of falls  [place] fall precautions   Educate patient/family on patient safety including physical limitations and importance of using call bell for assistance   Modify environment to reduce risk of injury including disconnecting from pole when not in use, ensuring adequate lighting in room and restroom, ensuring that path to restroom is clear and free of trip hazards  Out of bed as tolerated     Impaired Vision   Patient does have vision impairment   She does wear eyeglasses  Recommend use of corrective lenses at all appropriate  times  Encourage adequate lighting and encourage use of assistance with ambulation  Keep personal belongings close to avoid reaching  Encourage appropriate footwear at all times  Recommend large font for printed materials provided to patient    Dentition/Appetite   Patient does not wear dentures at baseline   She notes that her appetite is fair as discussed above   Ensure meal consistency is appropriate for all abilities   Consider nutrition consult   Continue aspiration precautions     Elimination   Patient is continent of bowel and bladder at baseline  She has been recently having incontinent episodes here   Suspect this may be related to encephalopathy   Patient has no documented history of constipation   Last documented bowel movement was this morning    She had been receiving lactulose TID but this appears to have been discontinued   Monitor for constipation and urinary retention     Insomnia   Patient notes she has some difficulty sleeping at home   She notes that she sleeps in a recliner chair at baseline   She is on trazodone 50 mg daily at bedtime at baseline   Her son noted that he works overnights and does not get home until around 0300 and she is often awake when he gets home and she sleeps on his schedule  Trazodone is currently on hold   Nursing notes that the patient slept last night   Can consider restarting trazodone at bedtime if needed now that mental status is improved  First line is behavioral therapy   Avoid sedative hypnotics including benzodiazepines and benadryl  Encourage staying awake during the day   Encourage daytime activities and morning exercise   Decrease or eliminate daytime naps   Avoid caffeine especially during late afternoon and evening hours  Establish a nighttime routine  Implement sleep hygiene and limit nighttime interruptions    Anxiety/Depression  Patient has a documented history of depression   She is on escitalopram at baseline   Mood appears stable on exam today    Continue current medication regimen    Continue supportive care     Right Hallux Gangrene  Patient noted to have gangrene of the right hallux on admission to [facility]  She was transferred to [facility] for surgical vascular intervention  She was subsequently evaluated by podiatry for this and is now POD 8 from a right partial first ray amputation  Intraoperative cultures were obtained and are pending  Dressing changes to be completed by podiatry  Patient remains on IV antibiotics secondary to leukocytosis  ID is consulted and following  Patient is currently denying pain on exam today  Pain management as discussed below  PT/OT consulted and following  Management per podiatry and ID    Aortoiliac Occlusive Disease   Patient with PAD with occlusion of multiple vessels  Patient was transferred from [facility] to [facility] for surgical vascular intervention  Patient is now POD 13 from a bilateral femoral endarterectomy with left to right femorofemoral PTFE bypass and retrograde left JACI, left EIA stenting, and bilateral groin VAC placement   Wound VAC remains intact  Patient was to return to the OR last week, however, this was postponed secondary to encephalopathy   Patient was tentatively returning to the OR today, however, it now appears that further intervention is on hold indefinitely due to medical condition and patients desire to not undergo any further surgical intervention at this time   Patient is currently denying pain  Pain management as discussed below  Management per vascular surgery    Acute Liver Failure without Hepatic Coma  Patient with elevated LFTs suspected to be related to acute illness and cephalosporins  There was some concern for shock on 7/30 as the patient was anemic and had elevated troponins  Ammonia level on 7/30 noted to be elevated at 73  Patient was started on lactulose last week and ammonia level on labs on 7/31 noted to be improved 48  Lactulose has  since been discontinued   GI is consulted and following  An ultrasound of the right upper quadrant on 7/31 revealed the common duct is mildly dilated at 8 mm  Recommended nonemergent MRCP for persistent LFT abnormality  LFTs have significantly improved  Continue to monitor liver function   Management per primary team     Elevated Troponins  Patient was noted to have elevated troponins on 7/30  She was noted to be tachycardic and anemic  She did receive a unit of blood on 7/30 and anemia improved  She had been on telemetry but this has since been discontinued   Cardiology is consulted and following  Management per cardiology and primary team    Acute Pain due to Surgery  Patient is POD 8 from a right partial first ray amputation and POD 13 from a bilateral femoral endarterectomy with left to right femorofemoral PTFE bypass and retrograde left JACI, left EIA stenting, and bilateral groin VAC placement   She is currently denying pain on exam today  Would recommend treating pain using the geriatric pain protocol as discussed below  Oxycodone 2.5 mg po Q 4 prn moderate pain  Oxycodone 5 mg po Q 4 prn severe pain   Dilaudid 0.2 mg IV Q 4 prn breakthrough pain  Would avoid Tylenol and gabapentin at this time secondary to acute liver failure and RICARDO  Monitor for constipation  Continue nonpharmacological methods of pain management      Subjective:   The patient is being seen and evaluated today at the bedside for geriatric follow-up.  She is noted to be lying in bed comfortably in no acute distress. She states she is feeling okay today. She does appear to be more confused today and is oriented only to person and place. She notes that her pain is controlled. She notes that she has shortness of breath at times but denies this now. She denies chest pain. She admits that she continues to have poor appetite. She notes that she does need to be changed as she is soiled. She believes she slept okay last night.     Care was  "coordinated with patients nurse Dennys. He notes no acute issues or events overnight.        Review of Systems   Constitutional:  Positive for activity change and appetite change (decreased appetite). Negative for fatigue.   HENT:  Negative for dental problem and hearing loss.    Eyes:  Positive for visual disturbance (glasses).   Respiratory:  Negative for cough and shortness of breath.    Cardiovascular:  Negative for chest pain and leg swelling.   Gastrointestinal:  Negative for abdominal distention and abdominal pain.   Genitourinary:  Negative for difficulty urinating and dysuria.   Musculoskeletal:  Positive for gait problem. Negative for arthralgias.   Skin:  Negative for color change and pallor.   Neurological:  Positive for weakness. Negative for speech difficulty.   Psychiatric/Behavioral:  Positive for confusion (appears to be more confused than yesterday). Negative for agitation and sleep disturbance. The patient is not nervous/anxious.          Objective:     Vitals: Blood pressure 130/74, pulse (!) 109, temperature (!) 97.3 °F (36.3 °C), temperature source Oral, resp. rate 20, height 4' 10\" (1.473 m), weight 65 kg (143 lb 4.8 oz), SpO2 96%.,Body mass index is 29.95 kg/m².      Intake/Output Summary (Last 24 hours) at 8/6/2024 1024  Last data filed at 8/6/2024 0922  Gross per 24 hour   Intake 840 ml   Output --   Net 840 ml       Current Medications: Reviewed    Physical Exam:   Physical Exam  Vitals and nursing note reviewed.   Constitutional:       General: She is not in acute distress.     Appearance: She is not ill-appearing.   HENT:      Head: Normocephalic.      Mouth/Throat:      Mouth: Mucous membranes are dry.   Eyes:      General: No scleral icterus.     Conjunctiva/sclera: Conjunctivae normal.   Cardiovascular:      Rate and Rhythm: Normal rate and regular rhythm.   Pulmonary:      Effort: Pulmonary effort is normal. No respiratory distress.   Abdominal:      General: Bowel sounds are normal. " "There is no distension.      Palpations: Abdomen is soft.      Tenderness: There is no abdominal tenderness.   Musculoskeletal:         General: No swelling or tenderness.   Skin:     General: Skin is warm and dry.      Comments: Wound VAC to groin   Right foot dressing clean, dry, and intact   Neurological:      Mental Status: She is alert.      Motor: Weakness present.      Gait: Gait abnormal.      Comments: Oriented to person and place  Disoriented to time          Invasive Devices       Peripheral Intravenous Line  Duration             Peripheral IV 07/31/24 Right Antecubital 6 days    Peripheral IV 08/04/24 Left;Upper;Ventral (anterior) Arm 2 days                    Lab, Imaging and other studies: I have personally reviewed pertinent reports.      Please note:  Voice-recognition software may have been used in the preparation of this document.  Occasional wrong word or \"sound-alike\" substitutions may have occurred due to the inherent limitations of voice recognition software.  Interpretation should be guided by context.    "

## 2024-08-06 NOTE — ASSESSMENT & PLAN NOTE
Acute blood loss anemia from procedures.  Has received 5 units of PRBC since admission.  Hemoglobin stable with last available blood draw, patient has been refusing labs.

## 2024-08-06 NOTE — PROGRESS NOTES
Podiatry - Progress Note  Patient: Anamaria Parnell 79 y.o. female   MRN: [id number]  PCP: Ritchie Lawton MD  Unit/Bed#: [address] 213-01 Encounter: [id number]  Date Of Visit: 08/06/24    ASSESSMENT:    Anamaria Parnell is a 79 y.o. female with:    Right hallux gangrene, Davidson grade 4  - s/p right partial first ray amputation (DOS: 7/29/24)  T2DM (HbA1c - 5.9%)  CKD Stage 4  Aortoiliac occlusive disease  PAD      PLAN:    POD 9 status post right partial first ray amputation, packed open.  Patient was evaluated at bedside with her son Austin, who is her POA.  We discussed the nature of her condition which is a currently an open partial first ray amputation and noted severe peripheral arterial disease.  I discussed with Austin several options for her care including palliative local wound care, proximal amputation and attempted limb salvage with bypass with re visional pedal amputation.  Answered all of his questions.  At the end of the conversation Austin wishes to pursue a bypass for his mother in the outpatient setting with vascular surgery.  I discussed with Austin that we will proceed with local wound care with the goals of preventing infection to the amputation site, reducing drainage and smell.  Patient send was amenable to the plan. Patient stable for d/c from podiatry standpoint with close outpatient follow up with podiatry/wound care center.   Antibiotics per ID  Appreciate dressing changes by nursing team.  Continue Betadine DSD of the right lower extremity.  Elevation and offloading on green foam wedges or pillows when non-ambulatory.  Rest of care per primary team.     Weightbearing status: Non-weightbearing right foot    SUBJECTIVE:     The patient was seen, evaluated, and assessed at bedside today. The patient was awake, alert, and in no acute distress. No acute events overnight.  The patient was not AAOx3 at bedside today.  She does not report any nausea vomit shortness breath or chest  "pain.      OBJECTIVE:     Vitals:   /65   Pulse 93   Temp (!) 97.3 °F (36.3 °C) (Oral)   Resp 20   Ht 4' 10\" (1.473 m)   Wt 65 kg (143 lb 4.8 oz)   LMP  (LMP Unknown)   SpO2 99%   BMI 29.95 kg/m²     Temp (24hrs), Av.4 °F (36.3 °C), Min:97.2 °F (36.2 °C), Max:97.8 °F (36.6 °C)      Physical Exam:     Lungs: Non labored breathing  Abdomen: Soft, non-tender.  Lower Extremity:  Cardiovascular status at baseline from admission.  Neurological status at baseline from admission.  Musculoskeletal status at baseline from admission. No calf tenderness noted.     Dressings left intact to the right lower extremity.  No strikethrough noted.    Additional Data:     Labs:    Results from last 7 days   Lab Units 24  0434   WBC Thousand/uL 16.63*   HEMOGLOBIN g/dL 10.1*   HEMATOCRIT % 31.0*   PLATELETS Thousands/uL 283   SEGS PCT % 82*   LYMPHO PCT % 6*   MONO PCT % 9   EOS PCT % 0     Results from last 7 days   Lab Units 24  0434   POTASSIUM mmol/L 4.4 2.9*   CHLORIDE mmol/L  --  99   CO2 mmol/L  --  32   BUN mg/dL  --  73*   CREATININE mg/dL  --  1.52*   CALCIUM mg/dL  --  8.4   ALK PHOS U/L  --  120*   ALT U/L  --  103*   AST U/L  --  52*     Results from last 7 days   Lab Units 24  1111   INR  1.70*       * I Have Reviewed All Lab Data Listed Above.    Recent Cultures (last 7 days):               Imaging: I have personally reviewed pertinent films in PACS  EKG, Pathology, and Other Studies: I have personally reviewed pertinent reports.    ** Please Note: Portions of the record may have been created with voice recognition software. Occasional wrong word or \"sound a like\" substitutions may have occurred due to the inherent limitations of voice recognition software. Read the chart carefully and recognize, using context, where substitutions have occurred. **      "

## 2024-08-06 NOTE — ASSESSMENT & PLAN NOTE
2020 Cath showed chronic occlusive CAD. No interventions. Medical management. 2023 Stress test was negative for myocardial ischemia.   No angina sxs

## 2024-08-06 NOTE — PLAN OF CARE
Problem: OCCUPATIONAL THERAPY ADULT  Goal: Performs self-care activities at highest level of function for planned discharge setting.  See evaluation for individualized goals.  Outcome: Progressing  Note: Limitation: Decreased ADL status, Decreased UE strength, Decreased Safe judgement during ADL, Decreased endurance, Decreased self-care trans, Decreased high-level ADLs  Prognosis: Fair  Assessment: Pt seen for skilled OT tx this date. Tx focused on improving strength, activity tolerance and balance, safety awareness to increase independence with self care tasks. Pt tolerated session fair. Pt was limited by weakness, pain. Greeted in supine.  Pt performed LB dressing / bathing maxA , bed mobility supine> sit maxAx1, sit> supine maxA  transfers maxAx1-2 slide board. Pt demonstrated fair - sitting balance during functional tasks. No LOB Noted. Non compliant with NWB. Pt demonstrated ability to safely and appropriately attend to all tasks during session. Pt required moderate verbal cuing during session to safely complete tasks. Vitals:145/65. Current OT DC recommendations for pt is level 2 resources.     Rehab Resource Intensity Level, OT: II (Moderate Resource Intensity)

## 2024-08-06 NOTE — CASE MANAGEMENT
Case Management Discharge Planning Note    Patient name Anamaria Parnell  Location [place] [address] /[place] 2 M* MRN [id number]  : 1945 Date 2024       Current Admission Date: 2024  Current Admission Diagnosis:Aortoiliac occlusive disease (HCC)   Patient Active Problem List    Diagnosis Date Noted Date Diagnosed    Melena 2024     Acute blood loss anemia 2024     Encephalopathy 2024     Elevated troponin 2024     Acute liver failure without hepatic coma 2024     Transaminitis 2024     Pulmonary hypertension (HCC) 2024     Chronic HFrEF, LVEF 30%, LVIDd 4.6 cm, AHA Stage C 2024     Paroxysmal atrial fibrillation (HCC) 2024     Coronary artery disease of native artery of native heart with stable angina pectoris (HCC) 2024     Cellulitis of toe of right foot 2024     Sepsis (HCC) 2024     Diabetic ulcer of toe of right foot associated with diabetes mellitus due to underlying condition, limited to breakdown of skin (HCC) 2024     Carotid stenosis, asymptomatic, right 10/27/2023     Complex renal cyst 10/18/2023     Encounter for follow-up surveillance of urothelial carcinoma of lower urinary tract 10/18/2023     Encounter to discuss test results 10/17/2023     Smoking 2023     Renal cyst 2023     Primary hypertension 2023     Dysarthria 08/15/2023     Stage 3b chronic kidney disease (HCC)      Aortoiliac occlusive disease (HCC) 10/11/2022     History of gastrointestinal stromal tumor (GIST) 2022     Secondary hyperparathyroidism of renal origin (HCC) 2022     Gastrointestinal stromal tumor (GIST) (HCC) 2022     Type 2 diabetes mellitus, without long-term current use of insulin (HCC) 2021     Type 2 diabetes mellitus with diabetic peripheral angiopathy without gangrene (HCC) 2021     Depression, recurrent (HCC) 2021     Acute renal failure (ARF) (HCC) 2020      Hypertensive kidney disease with stage 4 chronic kidney disease (HCC) 11/03/2020     Cervical radiculopathy 05/12/2020     Malignant neoplasm of overlapping sites of bladder ([facility]) 04/24/2020     Tachy-jeff syndrome ([facility]) 04/15/2020     Stenosis of left anterior descending artery Mid LAD 50-60% stenosis 04/01/2020     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 04/01/2020     Pulmonary nodule 7 mm groundglass opacity in the right upper lobe, unchanged since October 2019 03/19/2020     Atherosclerosis of native arteries of right leg with ulceration of other part of foot ([facility]) 03/03/2020     Symptomatic carotid artery stenosis, left 03/03/2020     Femoral artery stenosis, right (HCC) 50-75% stenosis in the common femoral artery. 01/14/2020     Mesenteric artery stenosis ([facility]) 01/14/2020     Positive cardiac stress test  small, mildly severe, partially reversible myocardial perfusion defect of anterior and inferior wall  11/12/2019     Abnormal CT of the chest suspicious for an infectious or inflammatory bronchiolitis. 11/07/2019     Celiac artery stenosis ([facility]) >70% stenosis in the celiac trunk 11/05/2019     Aortoiliac stenosis, left (HCC) 02/25/2019     Spondylosis of lumbar region without myelopathy or radiculopathy 01/29/2019     Lumbosacral spondylosis without myelopathy 01/29/2019     Statin intolerance 01/22/2019     Lumbar disc herniation 01/22/2019     Herniated lumbar intervertebral disc 01/22/2019     Chronic GERD 12/04/2017     Acute renal failure superimposed on stage 3 chronic kidney disease ([facility]) 12/04/2017     Claudication in peripheral vascular disease ([facility]) 12/04/2017     Gout 12/04/2017     Hx-TIA (transient ischemic attack) 12/04/2017     Hyperlipidemia associated with type 2 diabetes mellitus  ([facility]) 12/04/2017     Hypertension associated with diabetes  ([facility]) 12/04/2017     Osteoarthritis 12/04/2017     Right renal artery stenosis ([facility]) 12/04/2017      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     Hyperlipidemia, unspecified 12/04/2017     Vitamin D deficiency 09/08/2016     Basilar artery stenosis 06/22/2016     Type 2 diabetes mellitus with diabetic nephropathy (HCC) 12/19/2015     Hypercholesteremia 12/19/2015     Hypertension 12/19/2015     H/o CVA 11/09/2015       LOS (days): 18  Geometric Mean LOS (GMLOS) (days): 6.5  Days to GMLOS:-11.3     OBJECTIVE:  Risk of Unplanned Readmission Score: 44.4         Current admission status: Inpatient   Preferred Pharmacy: No Pharmacies Listed  Primary Care Provider: Ritchie Lawton MD    Primary Insurance: [facility]  Secondary Insurance:     DISCHARGE DETAILS:      Additional Comments: CM sent follow up message to  Physical and Occupational therapy, pending to request authorization for [facility]. CM to follow for discharge planning.    Saima Nj,

## 2024-08-06 NOTE — ASSESSMENT & PLAN NOTE
Acute kidney injury creatinine 2.37 secondary to sepsis.    Resolved.    Results from last 7 days   Lab Units 08/04/24  0434 08/02/24  0520 08/01/24  0440 07/31/24  0502 07/30/24  1808   BUN mg/dL 73* 69* 77* 77* 70*   CREATININE mg/dL 1.52* 1.88* 2.19* 2.37* 2.17*   EGFR ml/min/1.73sq m 32 25 21 19 21

## 2024-08-06 NOTE — ASSESSMENT & PLAN NOTE
Baseline LBBB and 1st-degree AV block.   On POD 4 she had 2:1 heart block from Toprol 100 mg QD  No further s/sxs of bradycardia on Toprol 25 mg QD, continue.  Since she is no longer having symptomatic Bradycardia we will hold off on PPM this admission.

## 2024-08-06 NOTE — PHYSICAL THERAPY NOTE
PHYSICAL THERAPY NOTE          Patient Name: Anamaria Parnell  Today's Date: 8/6/2024 08/06/24 1210   Note Type   Note Type Treatment   Pain Assessment   Pain Assessment Tool 0-10   Pain Score 10 - Worst Possible Pain   Pain Location/Orientation Location: Back;Orientation: Lower   Hospital Pain Intervention(s) Repositioned;Ambulation/increased activity;Emotional support   Restrictions/Precautions   RLE Weight Bearing Per Order NWB   LLE Weight Bearing Per Order WBAT   Other Precautions Cognitive;Chair Alarm;Bed Alarm;WBS;Multiple lines;Fall Risk;Pain  (wound vac)   General   Chart Reviewed Yes   Family/Caregiver Present   (family entered at end ofPTt session.)   Cognition   Overall Cognitive Status Impaired   Arousal/Participation Cooperative   Attention Attends with cues to redirect   Orientation Level Oriented to person;Oriented to place;Disoriented to time;Disoriented to situation   Memory Decreased recall of precautions;Decreased recall of recent events;Decreased short term memory   Following Commands Follows one step commands with increased time or repetition   Subjective   Subjective BAd day today.  pt  reports dizziness, nausea and fatigue.   Bed Mobility   Rolling R 3  Moderate assistance   Additional items Assist x 1;Bedrails;Increased time required;Verbal cues;LE management   Rolling L 3  Moderate assistance   Additional items Assist x 1;Bedrails;Increased time required;Verbal cues;LE management   Sit to Supine 2  Maximal assistance   Additional items Assist x 2;Increased time required;Verbal cues;LE management   Transfers   Sliding Board transfer 2  Maximal assistance   Additional items Assist x 2;Increased time required;Verbal cues  (max asist x2 for tranfer from w/c to bed.)   Additional Comments verbal cues for hand placement and positioning with SB transfer.  total assist to place and remove SB under pt    Ambulation/Elevation   Gait pattern Not appropriate  (pt is NWB to Rle, pt is uanble to maintain)   Balance   Static Sitting Fair   Dynamic Sitting Poor -   Static Standing Zero   Endurance Deficit   Endurance Deficit Description fatigue, pain   Activity Tolerance   Activity Tolerance Patient limited by pain;Patient limited by fatigue   Medical Staff Made Aware otr, kendrick   Exercises   Quad Sets Supine;10 reps;AROM;Bilateral   Heelslides Supine;10 reps;AAROM;Right   Hip Abduction Supine;10 reps;AAROM;Bilateral   Hip Adduction Supine;10 reps;AAROM;Bilateral   Knee AROM Short Arc Quad Supine;10 reps;AROM;AAROM;Right  (arrom L le)   Ankle Pumps Supine;10 reps;Left   Assessment   Prognosis Fair   Problem List Decreased strength;Decreased endurance;Impaired balance;Decreased mobility;Decreased cognition;Impaired judgement;Decreased safety awareness;Obesity;Decreased skin integrity;Orthopedic restrictions;Pain  (wbs)   Assessment Pt seen for PT treatment session this date with interventions consisting of bed mobility, transfer training, and HEP, and education provided as needed for safety and direction to improve functional mobility, safety awareness, and activity tolerance. Pt agreeable to PT treatment session upon arrival, pt found out of bed in w/c . At end of session, pt left  supine in bed with all needs in reach. In comparison to previous session, pt with no improvement activity tolerance, endurance, standing balance, ambulation distances, ambulatory balance, AM- pac score, and functional mobility. Pt  continues to demonstrates severely impaired ability to perform SB transfers from w/c to bed requiring maximal assistance x2 to perform and complete.  Total assist to place and remove SB from under pt.  Pt  requires verbal cues for transfer technique and proper hand placement.  Pt is limited by decreased overall strength, balance, endurance, activity tolerance and limitations in WBS of R le. Pt  performs supine  aa-arom to b/l le's  x 10 reps.  To tolerance.pt  will continue to benefit from PT services to maximize functional mobility and outcomes.   Continue to recommend  level II moderate rehab resource intensity  at time of d/c in order to maximize pt's functional independence and safety w/ mobility. Pt continues to be functioning below baseline level. PT will continue to see pt while here in order to address the deficits listed above and provide interventions consistent w/ POC in effort to achieve STGs.    The patient's AM-PAC Basic Mobility Inpatient Short Form Raw Score is 8. A raw score less than 16 suggests the patient may benefit from discharge to post-acute rehabilitation services. Please also refer to the recommendation of the Physical Therapist for safe discharge planning.   Goals   Patient Goals to go home.   STG Expiration Date 08/09/24   PT Treatment Day 4   Plan   Treatment/Interventions Functional transfer training;LE strengthening/ROM;Therapeutic exercise;Endurance training;Patient/family training;Equipment eval/education;Bed mobility;Gait training;Spoke to nursing   Progress Slow progress, decreased activity tolerance   PT Frequency 3-5x/wk   Discharge Recommendation   Rehab Resource Intensity Level, PT II (Moderate Resource Intensity)   AM-PAC Basic Mobility Inpatient   Turning in Flat Bed Without Bedrails 2   Lying on Back to Sitting on Edge of Flat Bed Without Bedrails 2   Moving Bed to Chair 1   Standing Up From Chair Using Arms 1   Walk in Room 1   Climb 3-5 Stairs With Railing 1   Basic Mobility Inpatient Raw Score 8   Turning Head Towards Sound 3   Follow Simple Instructions 3   Low Function Basic Mobility Raw Score  14   Low Function Basic Mobility Standardized Score  22.01   [facility] Highest Level Of Mobility   -HLM Goal 3: Sit at edge of bed   -HLM Achieved 4: Move to chair/commode   Education   Education Provided Mobility training;Home exercise program;Assistive device   Patient  Reinforcement needed   End of Consult   Patient Position at End of Consult Supine;Bed/Chair alarm activated;All needs within reach   Prabha Okeefe, PTA

## 2024-08-06 NOTE — ASSESSMENT & PLAN NOTE
[id number] 9. On Brilinta & Eliquis 5 mg BID.  BB is for HFrEF not rate control.  Telemetry shows xxx

## 2024-08-06 NOTE — ASSESSMENT & PLAN NOTE
Echo post-op R hallux amputation showed worsening EF (45--> 30%). Global hypokinesis with regional abnormalities similar to prior. Post-op Trop elevation (up to 4677), is multifactorial and likely stress induced from surgery on top of known underlying CAD in this very high risk patient with comorbidities and shock picture post-op.  Will need close monitoring for development of volume overload. Daily standing weights as tolerated.  Would continue GDMT and recheck echo in 3 months. Hopefully if shock resolves and BP remains stable her EF will improve at least to >35%.   Neurohormonal Blockade:  --Beta Blocker: Toprol 25 mg QD  --ARNi / ACEi / ARB: Losartan 100 mg QD on hold given post- op RICARDO. Once RICARDO resolves, would resume.   --Aldosterone Antagonist: None  --SGLT2 Inhibitor: None  --Home Diuretic: Torsemide 10 mg QD  Should the patient undergo more vascular surgery in the coming weeks, would check echo sooner (1 week post-op)  She is presently not a candidate for ICD given her AMS and open wounds (groin wounds, amputation wound).

## 2024-08-06 NOTE — CASE MANAGEMENT
Case Management Discharge Planning Note    Patient name Anamaria Parnell  Location [place] [address] /[place] 2 M* MRN [id number]  : 1945 Date 2024       Current Admission Date: 2024  Current Admission Diagnosis:Aortoiliac occlusive disease (HCC)   Patient Active Problem List    Diagnosis Date Noted Date Diagnosed    Patient refused evaluation 2024     Melena 2024     Acute blood loss anemia 2024     Encephalopathy 2024     Elevated troponin 2024     Acute liver failure without hepatic coma 2024     Transaminitis 2024     Pulmonary hypertension (HCC) 2024     Chronic HFrEF, LVEF 30%, LVIDd 4.6 cm, AHA Stage C 2024     Paroxysmal atrial fibrillation (HCC) 2024     Coronary artery disease of native artery of native heart with stable angina pectoris (HCC) 2024     Cellulitis of toe of right foot 2024     Sepsis (HCC) 2024     Diabetic ulcer of toe of right foot associated with diabetes mellitus due to underlying condition, limited to breakdown of skin (HCC) 2024     Carotid stenosis, asymptomatic, right 10/27/2023     Complex renal cyst 10/18/2023     Encounter for follow-up surveillance of urothelial carcinoma of lower urinary tract 10/18/2023     Encounter to discuss test results 10/17/2023     Smoking 2023     Renal cyst 2023     Primary hypertension 2023     Dysarthria 08/15/2023     Stage 3b chronic kidney disease (HCC)      Aortoiliac occlusive disease (HCC) 10/11/2022     History of gastrointestinal stromal tumor (GIST) 2022     Secondary hyperparathyroidism of renal origin (HCC) 2022     Gastrointestinal stromal tumor (GIST) (HCC) 2022     Type 2 diabetes mellitus, without long-term current use of insulin (HCC) 2021     Type 2 diabetes mellitus with diabetic peripheral angiopathy without gangrene (HCC) 2021     Depression, recurrent (HCC) 2021     Acute renal  failure (ARF) ([facility]) 11/03/2020     Hypertensive kidney disease with stage 4 chronic kidney disease ([facility]) 11/03/2020     Cervical radiculopathy 05/12/2020     Malignant neoplasm of overlapping sites of bladder ([facility]) 04/24/2020     Tachy-jeff syndrome ([facility]) 04/15/2020     Stenosis of left anterior descending artery Mid LAD 50-60% stenosis 04/01/2020     Right coronary artery occlusion ([facility])total occlusion of proximal RCA with collateral circ 04/01/2020     Pulmonary nodule 7 mm groundglass opacity in the right upper lobe, unchanged since October 2019 03/19/2020     Atherosclerosis of native arteries of right leg with ulceration of other part of foot ([facility]) 03/03/2020     Symptomatic carotid artery stenosis, left 03/03/2020     Femoral artery stenosis, right ([facility]) 50-75% stenosis in the common femoral artery. 01/14/2020     Mesenteric artery stenosis ([facility]) 01/14/2020     Positive cardiac stress test  small, mildly severe, partially reversible myocardial perfusion defect of anterior and inferior wall  11/12/2019     Abnormal CT of the chest suspicious for an infectious or inflammatory bronchiolitis. 11/07/2019     Celiac artery stenosis ([facility]) >70% stenosis in the celiac trunk 11/05/2019     Aortoiliac stenosis, left ([facility]) 02/25/2019     Spondylosis of lumbar region without myelopathy or radiculopathy 01/29/2019     Lumbosacral spondylosis without myelopathy 01/29/2019     Statin intolerance 01/22/2019     Lumbar disc herniation 01/22/2019     Herniated lumbar intervertebral disc 01/22/2019     Chronic GERD 12/04/2017     Acute renal failure superimposed on stage 3 chronic kidney disease ([facility]) 12/04/2017     Claudication in peripheral vascular disease ([facility]) 12/04/2017     Gout 12/04/2017     Hx-TIA (transient ischemic attack) 12/04/2017     Hyperlipidemia associated with type 2 diabetes mellitus  ([facility]) 12/04/2017     Hypertension associated with diabetes  ([facility]) 12/04/2017     Osteoarthritis 12/04/2017     Right renal  artery stenosis (HCC) 12/04/2017     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     Hyperlipidemia, unspecified 12/04/2017     Vitamin D deficiency 09/08/2016     Basilar artery stenosis 06/22/2016     Type 2 diabetes mellitus with diabetic nephropathy (HCC) 12/19/2015     Hypercholesteremia 12/19/2015     Hypertension 12/19/2015     H/o CVA 11/09/2015       LOS (days): 18  Geometric Mean LOS (GMLOS) (days): 6.5  Days to GMLOS:-11.5     OBJECTIVE:  Risk of Unplanned Readmission Score: 44.48         Current admission status: Inpatient   Preferred Pharmacy: No Pharmacies Listed  Primary Care Provider: Ritchie Lawton MD    Primary Insurance: [facility]  Secondary Insurance:     DISCHARGE DETAILS:  CM followed up with [facility], note entered in patient file. CM requested authorization, tagged CM discharge support in [employer].   CM to follow for further discharge planning.        Saima Nj,

## 2024-08-06 NOTE — ASSESSMENT & PLAN NOTE
Lab Results   Component Value Date    HGBA1C 5.9 (H) 07/09/2024     Recent Labs     08/05/24  1600 08/05/24  2110 08/06/24  0721 08/06/24  1128   POCGLU 185* 164* 173* 174*     Stable continue levemir 10 units and lispro 5 units 3 times daily.

## 2024-08-06 NOTE — PROGRESS NOTES
[facility]  Progress Note  Name: Anamaria Parnell I  MRN: [id number]  Unit/Bed#: [place] [address] [address] I Date of Admission: 7/19/2024   Date of Service: 8/6/2024 I Hospital Day: 18    Assessment & Plan   * Right great toe gangrene/osteomyelitis    Acute blood loss anemia  Goal Hgb > 8.   Monitor H+H now on Eliquis + Brilinta    Chronic HFrEF, LVEF 30%, LVIDd 4.6 cm, AHA Stage C  Echo post-op R hallux amputation showed worsening EF (45--> 30%). Global hypokinesis with regional abnormalities similar to prior. Post-op Trop elevation (up to 4677), is multifactorial and likely stress induced from surgery on top of known underlying CAD in this very high risk patient with comorbidities and shock picture post-op.  Will need close monitoring for development of volume overload. Daily standing weights as tolerated.  Would continue GDMT and recheck echo in 3 months. Hopefully if shock resolves and BP remains stable her EF will improve at least to >35%.   Neurohormonal Blockade:  --Beta Blocker: Toprol 25 mg QD  --ARNi / ACEi / ARB: Losartan 100 mg QD on hold given post- op RICARDO. Once RICARDO resolves, would resume.  --Aldosterone Antagonist: None  --SGLT2 Inhibitor: None  --Home Diuretic: Torsemide 10 mg QD  Should the patient undergo more vascular surgery in the coming weeks, would check echo sooner (1 week post-op)  She is presently not a candidate for ICD given her AMS and open wounds (groin wounds, amputation wound).    Paroxysmal atrial fibrillation  [id number] 9. On Brilinta & Eliquis 5 mg BID.  BB is for HFrEF not rate control.  Telemetry shows NSR & sinus tachycardia no recurring Afib or hearty block.    Coronary artery disease of native artery of native heart with stable angina pectoris  2020 Cath showed chronic occlusive CAD. No interventions. Medical management. 2023 Stress test was negative for myocardial ischemia.   No angina sxs    Tachy-jeff syndrome  Baseline LBBB and 1st-degree AV block.    On POD 4 she had 2:1 heart block from Toprol 100 mg QD  No further s/sxs of bradycardia on Toprol 25 mg QD, continue.  Since she is no longer having symptomatic Bradycardia we will hold off on PPM this admission.     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 For now, continue Brilinta + Eliquis.    Subjective:  Pt denies cp, pressure or heaviness pt reports her pain in the groin slightly more controlled today compared to yesterday. The site of right hasllux amputation painful today. Pt has not been oob today. She is trying to increase her po intake to get more protein to heal.    Pt denies sob or coughing pt denies edema.    Vitals:  Vitals:    08/05/24 0600 08/06/24 0600   Weight: 64.2 kg (141 lb 8.6 oz) 65 kg (143 lb 4.8 oz)   ,  Vitals:    08/05/24 2314 08/06/24 0300 08/06/24 0600 08/06/24 0720   BP: 150/65 137/65  130/74   BP Location: Left arm Right arm  Right arm   Pulse: 92 97  (!) 109   Resp: 18 18  20   Temp: 97.8 °F (36.6 °C) 97.5 °F (36.4 °C)  (!) 97.3 °F (36.3 °C)   TempSrc: Oral Oral  Oral   SpO2: 97% 97%  96%   Weight:   65 kg (143 lb 4.8 oz)    Height:         Exam:  Physical Exam  Vitals and nursing note reviewed.   Constitutional:       General: She is not in acute distress.  HENT:      Head: Normocephalic and atraumatic.      Nose: Nose normal.      Mouth/Throat:      Mouth: Mucous membranes are moist.   Eyes:      Conjunctiva/sclera: Conjunctivae normal.   Neck:      Vascular: No JVD.   Cardiovascular:      Rate and Rhythm: Regular rhythm.      Heart sounds: S1 normal and S2 normal. Heart sounds are distant.      Comments: Hr approx 100 bpm  Pulmonary:      Effort: Pulmonary effort is normal.      Breath sounds: No wheezing, rhonchi or rales.      Comments: No coughing, no hypoxia. No increased work of breathing at rest.  Abdominal:      General: Abdomen is  flat.      Tenderness: There is no abdominal tenderness.   Musculoskeletal:      Comments: 1 + pitting edema left leg no pitting edema right leg.    Bl ue with mild non pitting edema   Skin:     General: Skin is warm and dry.      Comments: I did not inspect her wounds   Neurological:      Mental Status: She is alert.      Cranial Nerves: No facial asymmetry.      Motor: No tremor.   Psychiatric:      Comments: Encephalopathy has improved     Telemetry:       SR with averahre HR 90s sinus tachy this morning up to ~ 111 bpm no Afib or flutter.    Medications:    Current Facility-Administered Medications:     allopurinol (ZYLOPRIM) tablet 100 mg, 100 mg, Oral, Daily, Costa Omer DPM, 100 mg at 08/06/24 0804    apixaban (ELIQUIS) tablet 5 mg, 5 mg, Oral, BID, Dennise Campbell PA-C, 5 mg at 08/06/24 0804    escitalopram (LEXAPRO) tablet 20 mg, 20 mg, Oral, Daily, Costa Omer DPM, 20 mg at 08/06/24 0804    HYDROmorphone HCl (DILAUDID) injection 0.2 mg, 0.2 mg, Intravenous, Q6H PRN, Costa Omer DPM    insulin detemir (LEVEMIR) subcutaneous injection 10 Units, 10 Units, Subcutaneous, Daily, Cullen Reinoso MD, 10 Units at 08/06/24 0804    insulin lispro (HumALOG/ADMELOG) 100 units/mL subcutaneous injection 1-5 Units, 1-5 Units, Subcutaneous, TID AC, 1 Units at 08/06/24 0804 **AND** Fingerstick Glucose (POCT), , , TID AC, Costa Omer DPM    insulin lispro (HumALOG/ADMELOG) 100 units/mL subcutaneous injection 5 Units, 5 Units, Subcutaneous, TID With Meals, Cullen Reinoso MD, 5 Units at 08/06/24 0804    lidocaine (LIDODERM) 5 % patch 1 patch, 1 patch, Topical, Daily, Costa Omer DPM, 1 patch at 08/03/24 0908    metoprolol succinate (TOPROL-XL) 24 hr tablet 25 mg, 25 mg, Oral, Daily, Timothy Rey DO, 25 mg at 08/06/24 0804    ondansetron (ZOFRAN) injection 4 mg, 4 mg, Intravenous, Q6H PRN, Costa Omer DPM    oxyCODONE (ROXICODONE) IR tablet 5 mg, 5 mg, Oral, Q6H PRN,  Costa Omer DPM, 5 mg at 08/03/24 2314    pantoprazole (PROTONIX) EC tablet 40 mg, 40 mg, Oral, BID, GRACIELA Ward, 40 mg at 08/06/24 0804    sodium bicarbonate tablet 650 mg, 650 mg, Oral, BID after meals, Costa Omer DPM, 650 mg at 08/06/24 0804    ticagrelor (BRILINTA) tablet 90 mg, 90 mg, Oral, Q12H JAVIER, Costa Omer DPM, 90 mg at 08/06/24 0804    torsemide (DEMADEX) tablet 10 mg, 10 mg, Oral, Daily, Costa Omer DPM, 10 mg at 08/06/24 0804    Labs/Data:        Results from last 7 days   Lab Units 08/04/24 0434 08/03/24 2209 08/02/24 0520 08/01/24  0440   WBC Thousand/uL 16.63*  --  25.63* 33.34*   HEMOGLOBIN g/dL 10.1* 10.0* 10.9* 10.5*   HEMATOCRIT % 31.0* 31.9* 34.0* 32.7*   PLATELETS Thousands/uL 283  --  272 303     Results from last 7 days   Lab Units 08/04/24 2012 08/04/24 0434 08/02/24 0520 08/01/24  0440   POTASSIUM mmol/L 4.4 2.9* 3.5 4.1   CHLORIDE mmol/L  --  99 104 107   CO2 mmol/L  --  32 25 26   BUN mg/dL  --  73* 69* 77*   CREATININE mg/dL  --  1.52* 1.88* 2.19*     Dennise Campbell PA-C

## 2024-08-06 NOTE — ASSESSMENT & PLAN NOTE
78 yo female ex-smoker w/ a hx of obesity, CKD IV, DM II w/ peripheral neuropathy, HLD, HTN, CAD, L MCA/PCA CVA S/P L TCAR 9/7/23 ([facility]), FÉLIX, mesenteric artery stenosis and aortoiliac occlusive disease w/ PAD and chronic R hallux dry gangrene presented to [facility] on 7/16/24 w/ worsening R great toe pain and R hallux gangrene. Pt was transferred to [facility] on 7/19 w/ plans for R femoral endarterectomy w/ retrograde stenting and RLE bypass.     - s/p B/L CFAE, L JACI/EIA balloon angio and shockwave lithotripsy w/ insertion of a L EIA drug-coated stent and L JACI VBX stent, L to R PTFE fem-fem bypass, and B/L groin vac placement on 7/24.   - s/p right hallux amputation 7/29/24  - s/p 3U PRBC and 1U FFP intra-op and 1U PRBC on 7/30 and 7/31.    Plan was to return to the OR for right femoral to BK-pop bypass.  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       Imaging:  -7/30/24 CT head: Chronic left PCA territory infarct. No evidence of acute infarct, intracranial hemorrhage or mass.  -7/29/24 LEAD: Right: Evidence of patent endarterectomy with diffuse disease noted throughout the femoral-popliteal arteries without significant focal stenosis. LISA: 0.53/48/22. Left: Evidence of patent endarterectomy with diffuse disease noted throughout the femoral-popliteal arteries without significant focal stenosis. L LISA: 0.57/10/43  -7/29/24 Xray R foot: Increasing volume of air within the soft tissues of the first toe in this patient with provided history of gangrene. The previously described cortical destruction along the ventral aspect of the first distal phalanx is not well visualized on the current examination, likely attributed to  differences in patient positioning.  -7/22/24 Xray R foot: Cortical destruction along the ventral aspect of the first distal phalanx worrisome for acute osteomyelitis. Large air within the overlying soft tissues.   -7/21/24 Echo: Left ventricular wall thickness is severely increased. The left ventricular ejection fraction is 41% by biplane measurement. Systolic function is mildly reduced. Global longitudinal strain is reduced at -10% with moderate to severe strain reduction in the ED mid to basal anteroseptal septal and inferior walls.. Unable to grade diastolic dysfunction given the severe degree of mitral annular calcification.   -7/18/24 CTA abd w/ runoff: Visualized descending thoracic and suprarenal abdominal aorta demonstrate marked soft plaque with multifocal regions of plaque ulceration. A point of relative stenosis within the infrarenal abdominal aorta measures up to 7 mm in diameter. Bilateral multifocal severe stenosis within the external iliac and common femoral arteries. Bilateral long segment SFA occlusion extending from the ostia to the level of the P1 popliteal artery. Three-vessel runoff on the right, the posterior tibial artery is dominant. Two-vessel runoff on the left, the peroneal artery is dominant. Segmental visualization of the posterior tibial artery. Interval increase in size of an exophytic lesion arising medially from the stomach, again consistent with gastrointestinal stromal tumor. New dilation of the pancreatic duct within the pancreatic head, no obvious pancreatic/ampulla lesion identified.  -7/16/24 LEAD: Right: Severe diffuse disease noted throughout the femoral-popliteal arteries with high grade stenosis vs occlusion of the proximal-distal SFA with reconstitution to the popliteal artery and high grade stenosis vs occlusion of the distal YOVANY. R LISA: 0.11/-/-. Left: Severe diffuse disease noted throughout the femoral-popliteal arteries with high grade stenosis vs occlusion of the  proximal-mid SFA with reconstitution in the distal SFA and high grade stenosis vs occlusion of the entire PTA. L LISA: 0.31/20/19.     Labs: 8/4 (refusing current labs)  - WBC 16.63  - Hgb 10.1    Plan:   -Advanced calcific atherosclerotic AIOD/PAD w/ CTLI and R hallux dry gangrene (+) OM now s/p R hallux amputation w/ partial 1st ray resection by podiatry on 7/29  -S/P B/L CFAE, L JACI/EIA balloon angio and shockwave lithotripsy w/ insertion of a L EIA drug-coated stent and L JACI VBX stent, L to R PTFE fem-fem bypass, and B/L groin vac placement on 7/24 (POD #13)  -B/L groin wound vacs intact w/ adequate suction; serous drainage, continue wound vac changes qMWF  -No plan for additional vascular intervention at this time. Plan to discharge to nursing facility with open toe amp and b/l groin VACs. Follow up with vascular in 2 weeks to determine next steps of right fem pop bypass   -ID following for ABX management, input appreciated; surgical cultures growing Morganella morganii  -Cardiology following w/ plans for eventual consideration for PPM once infection resolved and revascularization complete   -Continue to monitor Hgb and transfuse < 7.0 per primary team (pt current;y refusing blood draws)  -Continue brilinta and Eliquis (per cards) and pravastatin for medical management  -GI sign off, LFTs down trending. No indication for further imaging or intervention.  -Geriatrics input appreciated  -Continue medical management per primary team  -PT/OT follownig  -Will discuss w/ Dr. Sin

## 2024-08-06 NOTE — ASSESSMENT & PLAN NOTE
H/o L MCA and PCA watershed CVA due to L ICA stenosis   s/p L TCAR 9/7/23   Once she gets P2Y12 study as OP we can switch her from Brilinta + Elqius to Plavix + Eliquis. For now, continue Brilinta + Eliquis.

## 2024-08-06 NOTE — PROGRESS NOTES
[facility]  Progress Note  Name: Anamaria Parnell I  MRN: [id number]  Unit/Bed#: [place] [address] -01 I Date of Admission: 7/19/2024   Date of Service: 8/6/2024 I Hospital Day: 18    Assessment & Plan   * Aortoiliac occlusive disease (HCC)  Assessment & Plan  80 yo female ex-smoker w/ a hx of obesity, CKD IV, DM II w/ peripheral neuropathy, HLD, HTN, CAD, L MCA/PCA CVA S/P L TCAR 9/7/23 ([facility]), FÉLIX, mesenteric artery stenosis and aortoiliac occlusive disease w/ PAD and chronic R hallux dry gangrene presented to [facility] on 7/16/24 w/ worsening R great toe pain and R hallux gangrene. Pt was transferred to [facility] on 7/19 w/ plans for R femoral endarterectomy w/ retrograde stenting and RLE bypass.     - s/p B/L CFAE, L JACI/EIA balloon angio and shockwave lithotripsy w/ insertion of a L EIA drug-coated stent and L JACI VBX stent, L to R PTFE fem-fem bypass, and B/L groin vac placement on 7/24.   - s/p right hallux amputation 7/29/24  - s/p 3U PRBC and 1U FFP intra-op and 1U PRBC on 7/30 and 7/31.    Plan was to return to the OR for right femoral to BK-pop bypass.  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       Imaging:  -7/30/24 CT head: Chronic left PCA territory infarct. No evidence of acute infarct, intracranial hemorrhage or mass.  -7/29/24 LEAD: Right: Evidence of patent endarterectomy with diffuse disease noted throughout the femoral-popliteal arteries without significant focal stenosis. LISA: 0.53/48/22. Left: Evidence of patent endarterectomy with diffuse disease noted throughout the femoral-popliteal arteries without significant focal stenosis. L LISA: 0.57/10/43  -7/29/24 Xray R foot:  Increasing volume of air within the soft tissues of the first toe in this patient with provided history of gangrene. The previously described cortical destruction along the ventral aspect of the first distal phalanx is not well visualized on the current examination, likely attributed to differences in patient positioning.  -7/22/24 Xray R foot: Cortical destruction along the ventral aspect of the first distal phalanx worrisome for acute osteomyelitis. Large air within the overlying soft tissues.   -7/21/24 Echo: Left ventricular wall thickness is severely increased. The left ventricular ejection fraction is 41% by biplane measurement. Systolic function is mildly reduced. Global longitudinal strain is reduced at -10% with moderate to severe strain reduction in the ED mid to basal anteroseptal septal and inferior walls.. Unable to grade diastolic dysfunction given the severe degree of mitral annular calcification.   -7/18/24 CTA abd w/ runoff: Visualized descending thoracic and suprarenal abdominal aorta demonstrate marked soft plaque with multifocal regions of plaque ulceration. A point of relative stenosis within the infrarenal abdominal aorta measures up to 7 mm in diameter. Bilateral multifocal severe stenosis within the external iliac and common femoral arteries. Bilateral long segment SFA occlusion extending from the ostia to the level of the P1 popliteal artery. Three-vessel runoff on the right, the posterior tibial artery is dominant. Two-vessel runoff on the left, the peroneal artery is dominant. Segmental visualization of the posterior tibial artery. Interval increase in size of an exophytic lesion arising medially from the stomach, again consistent with gastrointestinal stromal tumor. New dilation of the pancreatic duct within the pancreatic head, no obvious pancreatic/ampulla lesion identified.  -7/16/24 LEAD: Right: Severe diffuse disease noted throughout the femoral-popliteal arteries with high grade  "stenosis vs occlusion of the proximal-distal SFA with reconstitution to the popliteal artery and high grade stenosis vs occlusion of the distal YOVANY. R LISA: 0.11/-/-. Left: Severe diffuse disease noted throughout the femoral-popliteal arteries with high grade stenosis vs occlusion of the proximal-mid SFA with reconstitution in the distal SFA and high grade stenosis vs occlusion of the entire PTA. L LISA: 0.31/20/19.     Labs: 8/4 (refusing current labs)  - WBC 16.63  - Hgb 10.1    Plan:   -Advanced calcific atherosclerotic AIOD/PAD w/ CTLI and R hallux dry gangrene (+) OM now s/p R hallux amputation w/ partial 1st ray resection by podiatry on 7/29  -S/P B/L CFAE, L JACI/EIA balloon angio and shockwave lithotripsy w/ insertion of a L EIA drug-coated stent and L JACI VBX stent, L to R PTFE fem-fem bypass, and B/L groin vac placement on 7/24 (POD #13)  -B/L groin wound vacs intact w/ adequate suction; serous drainage, continue wound vac changes qMWF  -No plan for additional vascular intervention at this time. Plan to discharge to nursing facility with open toe amp and b/l groin VACs. Follow up with vascular in 2 weeks to determine next steps of right fem pop bypass   -ID following for ABX management, input appreciated; surgical cultures growing Morganella morganii  -Cardiology following w/ plans for eventual consideration for PPM once infection resolved and revascularization complete   -Continue to monitor Hgb and transfuse < 7.0 per primary team (pt current;y refusing blood draws)  -Continue brilinta and Eliquis (per cards) and pravastatin for medical management  -GI sign off, LFTs down trending. No indication for further imaging or intervention.  -Geriatrics input appreciated  -Continue medical management per primary team  -PT/OT follownig  -Will discuss w/ Dr. Sin           Subjective:  Resting in bed, NAD. \" I am just so tired and I want to get out of here\"  VAC CDI to b/l groin with adequate suction    Vitals:  BP " "130/74 (BP Location: Right arm)   Pulse (!) 109   Temp (!) 97.3 °F (36.3 °C) (Oral)   Resp 20   Ht 4' 10\" (1.473 m)   Wt 65 kg (143 lb 4.8 oz)   LMP  (LMP Unknown)   SpO2 96%   BMI 29.95 kg/m²     I/Os:  I/O last 3 completed shifts:  In: 840 [P.O.:840]  Out: 75 [Drains:75]  No intake/output data recorded.    Lab Results and Cultures:   Lab Results   Component Value Date    WBC 16.63 (H) 08/04/2024    HGB 10.1 (L) 08/04/2024    HCT 31.0 (L) 08/04/2024    MCV 97 08/04/2024     08/04/2024     Lab Results   Component Value Date    GLUCOSE 179 (H) 07/24/2024    CALCIUM 8.4 08/04/2024    K 4.4 08/04/2024    CO2 32 08/04/2024    CL 99 08/04/2024    BUN 73 (H) 08/04/2024    CREATININE 1.52 (H) 08/04/2024     Lab Results   Component Value Date    INR 1.70 (H) 07/31/2024    INR 2.10 (H) 07/30/2024    INR 1.51 (H) 07/24/2024    PROTIME 20.1 (H) 07/31/2024    PROTIME 23.7 (H) 07/30/2024    PROTIME 18.4 (H) 07/24/2024        Blood Culture:   Lab Results   Component Value Date    BLOODCX No Growth After 5 Days. 07/17/2024   ,   Urinalysis:   Lab Results   Component Value Date    COLORU Light Yellow 07/17/2024    CLARITYU Clear 07/17/2024    SPECGRAV 1.016 07/17/2024    PHUR 5.5 07/17/2024    LEUKOCYTESUR Moderate (A) 07/17/2024    NITRITE Negative 07/17/2024    GLUCOSEU Negative 07/17/2024    KETONESU Negative 07/17/2024    BILIRUBINUR Negative 07/17/2024    BLOODU Negative 07/17/2024   ,   Urine Culture:   Lab Results   Component Value Date    URINECX (A) 07/17/2024     >100,000 cfu/ml - Lactobacillus gasseri Lactobacillus species    URINECX <10,000 cfu/ml Micrococcus luteus (A) 07/17/2024    URINECX (A) 07/17/2024     <10,000 cfu/ml - Lactobacillus rhamnosus Lactobacillus species   ,   Wound Culure: No results found for: \"WOUNDCULT\"    Medications:  Current Facility-Administered Medications   Medication Dose Route Frequency    allopurinol (ZYLOPRIM) tablet 100 mg  100 mg Oral Daily    apixaban (ELIQUIS) tablet 5 " mg  5 mg Oral BID    escitalopram (LEXAPRO) tablet 20 mg  20 mg Oral Daily    HYDROmorphone HCl (DILAUDID) injection 0.2 mg  0.2 mg Intravenous Q6H PRN    insulin detemir (LEVEMIR) subcutaneous injection 10 Units  10 Units Subcutaneous Daily    insulin lispro (HumALOG/ADMELOG) 100 units/mL subcutaneous injection 1-5 Units  1-5 Units Subcutaneous TID AC    insulin lispro (HumALOG/ADMELOG) 100 units/mL subcutaneous injection 5 Units  5 Units Subcutaneous TID With Meals    lidocaine (LIDODERM) 5 % patch 1 patch  1 patch Topical Daily    metoprolol succinate (TOPROL-XL) 24 hr tablet 25 mg  25 mg Oral Daily    ondansetron (ZOFRAN) injection 4 mg  4 mg Intravenous Q6H PRN    oxyCODONE (ROXICODONE) IR tablet 5 mg  5 mg Oral Q6H PRN    pantoprazole (PROTONIX) EC tablet 40 mg  40 mg Oral BID    sodium bicarbonate tablet 650 mg  650 mg Oral BID after meals    ticagrelor (BRILINTA) tablet 90 mg  90 mg Oral Q12H JAVIER    torsemide (DEMADEX) tablet 10 mg  10 mg Oral Daily       Imaging:  As above    Physical Exam:    General appearance: alert and oriented, in no acute distress  Lungs:  even and unlabored  Heart: regular rate and rhythm, S1, S2 normal, no murmur, click, rub or gallop  Abdomen: soft, non-tender; bowel sounds normal; no masses,  no organomegaly and ecchymotic  Extremities:  Perfused/M/S intact.  Right foot podiatric dressing in place.  +1 edema bilaterally    Wound/Incision:  B/l groin surgical wounds with VAC intact.  Continue suction 1 to 25 mmHg.  Serous drainage    Pulse exam:  Radial: Right: 2+ Left:: 2+  DP: Right:  dsg  Left: non-palpable  PT: Right:  deg  Left: non-palpable      GRACIELA Vogt  8/6/2024  [facility], [phone]

## 2024-08-06 NOTE — PROGRESS NOTES
Vascular Nurse Navigator Post Op Education    Met with patient at bedside to introduce myself as Vascular Nurse Navigator and explained my role.  Patient is appropriate and accepting to education. Patient was educated with Review of written materials provided, Teachback, Explanation, Demonstration, and Question & Answer on expectations of post op care and recovery on bilateral femoral endarterectomy, retrograde b/l iliac stenting, R to L fem-fem bypass and bilateral groin vac placement. Patient is a former smoker, quit 4 years ago, as such Smoking effects on the lungs, tobacco triggers, and Smoking cessation was reviewed. Education provided to patient on infection prevention, activity limitations, when to call the office, importance of follow up, and incisional care.  Discharge instruction handout provided to patient to review.

## 2024-08-06 NOTE — ASSESSMENT & PLAN NOTE
History of PAD CAD diabetes and hypertension presented to [facility] for right great toe gangrene transferred here for vascular bypass.  This admission: Status post bilateral femoral endarterectomy with bypass and JACI/EIA stenting  VAC still present.  Continue aspirin and Brilinta.  Gangrene of toe/cellulitis.  Status post first ray amputation.  Completed course of ceftriaxone per ID.  No further vascular/podiatry interventions anticipated.  Disposition: To rehab

## 2024-08-07 LAB
ALBUMIN SERPL BCG-MCNC: 2.9 G/DL (ref 3.5–5)
ALP SERPL-CCNC: 77 U/L (ref 34–104)
ALT SERPL W P-5'-P-CCNC: 30 U/L (ref 7–52)
ANION GAP SERPL CALCULATED.3IONS-SCNC: 14 MMOL/L (ref 4–13)
AST SERPL W P-5'-P-CCNC: 24 U/L (ref 13–39)
BILIRUB SERPL-MCNC: 1.11 MG/DL (ref 0.2–1)
BUN SERPL-MCNC: 67 MG/DL (ref 5–25)
CALCIUM ALBUM COR SERPL-MCNC: 9.6 MG/DL (ref 8.3–10.1)
CALCIUM SERPL-MCNC: 8.7 MG/DL (ref 8.4–10.2)
CHLORIDE SERPL-SCNC: 100 MMOL/L (ref 96–108)
CO2 SERPL-SCNC: 27 MMOL/L (ref 21–32)
CREAT SERPL-MCNC: 1.58 MG/DL (ref 0.6–1.3)
ERYTHROCYTE [DISTWIDTH] IN BLOOD BY AUTOMATED COUNT: 24.5 % (ref 11.6–15.1)
GFR SERPL CREATININE-BSD FRML MDRD: 31 ML/MIN/1.73SQ M
GLUCOSE SERPL-MCNC: 117 MG/DL (ref 65–140)
GLUCOSE SERPL-MCNC: 132 MG/DL (ref 65–140)
GLUCOSE SERPL-MCNC: 179 MG/DL (ref 65–140)
GLUCOSE SERPL-MCNC: 195 MG/DL (ref 65–140)
GLUCOSE SERPL-MCNC: 210 MG/DL (ref 65–140)
HCT VFR BLD AUTO: 28.4 % (ref 34.8–46.1)
HGB BLD-MCNC: 8.7 G/DL (ref 11.5–15.4)
MCH RBC QN AUTO: 30.9 PG (ref 26.8–34.3)
MCHC RBC AUTO-ENTMCNC: 30.6 G/DL (ref 31.4–37.4)
MCV RBC AUTO: 101 FL (ref 82–98)
PLATELET # BLD AUTO: 388 THOUSANDS/UL (ref 149–390)
PMV BLD AUTO: 11.5 FL (ref 8.9–12.7)
POTASSIUM SERPL-SCNC: 4 MMOL/L (ref 3.5–5.3)
PROT SERPL-MCNC: 5.4 G/DL (ref 6.4–8.4)
RBC # BLD AUTO: 2.82 MILLION/UL (ref 3.81–5.12)
SODIUM SERPL-SCNC: 141 MMOL/L (ref 135–147)
WBC # BLD AUTO: 25.59 THOUSAND/UL (ref 4.31–10.16)

## 2024-08-07 PROCEDURE — 99232 SBSQ HOSP IP/OBS MODERATE 35: CPT | Performed by: STUDENT IN AN ORGANIZED HEALTH CARE EDUCATION/TRAINING PROGRAM

## 2024-08-07 PROCEDURE — 99024 POSTOP FOLLOW-UP VISIT: CPT | Performed by: NURSE PRACTITIONER

## 2024-08-07 PROCEDURE — 85027 COMPLETE CBC AUTOMATED: CPT | Performed by: INTERNAL MEDICINE

## 2024-08-07 PROCEDURE — 80053 COMPREHEN METABOLIC PANEL: CPT | Performed by: INTERNAL MEDICINE

## 2024-08-07 PROCEDURE — 82948 REAGENT STRIP/BLOOD GLUCOSE: CPT

## 2024-08-07 PROCEDURE — 97606 NEG PRS WND THER DME>50 SQCM: CPT | Performed by: NURSE PRACTITIONER

## 2024-08-07 PROCEDURE — 99232 SBSQ HOSP IP/OBS MODERATE 35: CPT | Performed by: INTERNAL MEDICINE

## 2024-08-07 PROCEDURE — 99233 SBSQ HOSP IP/OBS HIGH 50: CPT | Performed by: INTERNAL MEDICINE

## 2024-08-07 RX ORDER — PANTOPRAZOLE SODIUM 40 MG/10ML
40 INJECTION, POWDER, LYOPHILIZED, FOR SOLUTION INTRAVENOUS EVERY 12 HOURS SCHEDULED
Status: DISCONTINUED | OUTPATIENT
Start: 2024-08-07 | End: 2024-08-09 | Stop reason: HOSPADM

## 2024-08-07 RX ADMIN — SODIUM BICARBONATE 650 MG TABLET 650 MG: at 17:47

## 2024-08-07 RX ADMIN — ALLOPURINOL 100 MG: 100 TABLET ORAL at 09:31

## 2024-08-07 RX ADMIN — INSULIN DETEMIR 10 UNITS: 100 INJECTION, SOLUTION SUBCUTANEOUS at 09:30

## 2024-08-07 RX ADMIN — TICAGRELOR 90 MG: 90 TABLET ORAL at 09:31

## 2024-08-07 RX ADMIN — SODIUM BICARBONATE 650 MG TABLET 650 MG: at 09:31

## 2024-08-07 RX ADMIN — PANTOPRAZOLE SODIUM 40 MG: 40 INJECTION, POWDER, FOR SOLUTION INTRAVENOUS at 22:16

## 2024-08-07 RX ADMIN — LIDOCAINE 5% 1 PATCH: 700 PATCH TOPICAL at 09:31

## 2024-08-07 RX ADMIN — TORSEMIDE 10 MG: 10 TABLET ORAL at 09:31

## 2024-08-07 RX ADMIN — INSULIN LISPRO 5 UNITS: 100 INJECTION, SOLUTION INTRAVENOUS; SUBCUTANEOUS at 12:17

## 2024-08-07 RX ADMIN — ESCITALOPRAM OXALATE 20 MG: 20 TABLET ORAL at 09:30

## 2024-08-07 RX ADMIN — OXYCODONE HYDROCHLORIDE 5 MG: 5 TABLET ORAL at 09:30

## 2024-08-07 RX ADMIN — METOPROLOL SUCCINATE 25 MG: 25 TABLET, EXTENDED RELEASE ORAL at 09:31

## 2024-08-07 RX ADMIN — TICAGRELOR 90 MG: 90 TABLET ORAL at 22:16

## 2024-08-07 RX ADMIN — INSULIN LISPRO 2 UNITS: 100 INJECTION, SOLUTION INTRAVENOUS; SUBCUTANEOUS at 12:17

## 2024-08-07 RX ADMIN — APIXABAN 5 MG: 5 TABLET, FILM COATED ORAL at 17:48

## 2024-08-07 RX ADMIN — APIXABAN 5 MG: 5 TABLET, FILM COATED ORAL at 09:31

## 2024-08-07 RX ADMIN — PANTOPRAZOLE SODIUM 40 MG: 40 TABLET, DELAYED RELEASE ORAL at 09:31

## 2024-08-07 RX ADMIN — HYDROMORPHONE HYDROCHLORIDE 0.2 MG: 0.2 INJECTION, SOLUTION INTRAMUSCULAR; INTRAVENOUS; SUBCUTANEOUS at 10:33

## 2024-08-07 NOTE — ASSESSMENT & PLAN NOTE
Acute blood loss anemia from procedures.  Has received 5 units of PRBC since admission.  Patient hemoglobin is dropping.  Reportedly there is still some melanotic stools.  Discussed with GI regarding possible endoscopy    Results from last 7 days   Lab Units 08/07/24  0543 08/04/24  0434 08/03/24  2209 08/02/24  0520   HEMOGLOBIN g/dL 8.7* 10.1* 10.0* 10.9*

## 2024-08-07 NOTE — CASE MANAGEMENT
Case Management Discharge Planning Note    Patient name Anamaria Parnell  Location [place] [address] /South 2 M* MRN [id number]  : 1945 Date 2024       Current Admission Date: 2024  Current Admission Diagnosis:Aortoiliac occlusive disease (HCC)   Patient Active Problem List    Diagnosis Date Noted Date Diagnosed    Patient refused evaluation 2024     Melena 2024     Acute blood loss anemia 2024     Encephalopathy 2024     Elevated troponin 2024     Acute liver failure without hepatic coma 2024     Transaminitis 2024     Pulmonary hypertension (HCC) 2024     Chronic HFrEF, LVEF 30%, LVIDd 4.6 cm, AHA Stage C 2024     Paroxysmal atrial fibrillation (HCC) 2024     Coronary artery disease of native artery of native heart with stable angina pectoris (HCC) 2024     Cellulitis of toe of right foot 2024     Sepsis (HCC) 2024     Diabetic ulcer of toe of right foot associated with diabetes mellitus due to underlying condition, limited to breakdown of skin (HCC) 2024     Carotid stenosis, asymptomatic, right 10/27/2023     Complex renal cyst 10/18/2023     Encounter for follow-up surveillance of urothelial carcinoma of lower urinary tract 10/18/2023     Encounter to discuss test results 10/17/2023     Smoking 2023     Renal cyst 2023     Primary hypertension 2023     Dysarthria 08/15/2023     Stage 3b chronic kidney disease (HCC)      Aortoiliac occlusive disease (HCC) 10/11/2022     History of gastrointestinal stromal tumor (GIST) 2022     Secondary hyperparathyroidism of renal origin (HCC) 2022     Gastrointestinal stromal tumor (GIST) (HCC) 2022     Type 2 diabetes mellitus, without long-term current use of insulin (HCC) 2021     Type 2 diabetes mellitus with diabetic peripheral angiopathy without gangrene (HCC) 2021     Depression, recurrent (HCC) 2021     Acute renal  failure (ARF) ([facility]) 11/03/2020     Hypertensive kidney disease with stage 4 chronic kidney disease ([facility]) 11/03/2020     Cervical radiculopathy 05/12/2020     Malignant neoplasm of overlapping sites of bladder ([facility]) 04/24/2020     Tachy-jeff syndrome ([facility]) 04/15/2020     Stenosis of left anterior descending artery Mid LAD 50-60% stenosis 04/01/2020     Right coronary artery occlusion ([facility])total occlusion of proximal RCA with collateral circ 04/01/2020     Pulmonary nodule 7 mm groundglass opacity in the right upper lobe, unchanged since October 2019 03/19/2020     Atherosclerosis of native arteries of right leg with ulceration of other part of foot ([facility]) 03/03/2020     Symptomatic carotid artery stenosis, left 03/03/2020     Femoral artery stenosis, right ([facility]) 50-75% stenosis in the common femoral artery. 01/14/2020     Mesenteric artery stenosis ([facility]) 01/14/2020     Positive cardiac stress test  small, mildly severe, partially reversible myocardial perfusion defect of anterior and inferior wall  11/12/2019     Abnormal CT of the chest suspicious for an infectious or inflammatory bronchiolitis. 11/07/2019     Celiac artery stenosis ([facility]) >70% stenosis in the celiac trunk 11/05/2019     Aortoiliac stenosis, left ([facility]) 02/25/2019     Spondylosis of lumbar region without myelopathy or radiculopathy 01/29/2019     Lumbosacral spondylosis without myelopathy 01/29/2019     Statin intolerance 01/22/2019     Lumbar disc herniation 01/22/2019     Herniated lumbar intervertebral disc 01/22/2019     Chronic GERD 12/04/2017     Acute renal failure superimposed on stage 3 chronic kidney disease ([facility]) 12/04/2017     Claudication in peripheral vascular disease ([facility]) 12/04/2017     Gout 12/04/2017     Hx-TIA (transient ischemic attack) 12/04/2017     Hyperlipidemia associated with type 2 diabetes mellitus  ([facility]) 12/04/2017     Hypertension associated with diabetes  ([facility]) 12/04/2017     Osteoarthritis 12/04/2017     Right renal  artery stenosis (HCC) 12/04/2017     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     Hyperlipidemia, unspecified 12/04/2017     Vitamin D deficiency 09/08/2016     Basilar artery stenosis 06/22/2016     Type 2 diabetes mellitus with diabetic nephropathy (HCC) 12/19/2015     Hypercholesteremia 12/19/2015     Hypertension 12/19/2015     H/o CVA 11/09/2015       LOS (days): 19  Geometric Mean LOS (GMLOS) (days): 6.5  Days to GMLOS:-12.3     OBJECTIVE:  Risk of Unplanned Readmission Score: 42.18         Current admission status: Inpatient   Preferred Pharmacy: No Pharmacies Listed  Primary Care Provider: Ritchie Lawton MD    Primary Insurance: YR Free  Secondary Insurance:     DISCHARGE DETAILS:     IMM Given (Date):: 08/07/24  IMM Given to:: Family  Family notified:: Pt's son    Additional Comments: Patient reviewed during care coordination rounds with Dr. Flores who informed that pt will likely be medically stable for discharge in 24-48 hours pending EGD. Continuing to await insurance auth determination for [facility] at this time. IMM reviewed with pt's son as pt is confused, pt's son expressed understanding. IMM in scan bin to be entered into pt's EHR. CM department to follow.

## 2024-08-07 NOTE — PROGRESS NOTES
Progress Note - Geriatric Medicine   Anamaria Parnell 79 y.o. female MRN: [id number]  Unit/Bed#: [place] [address] -01 Encounter: [id number]      Assessment/Plan:    Acute Encephalopathy  Presented to the hospital for right great toe pain/gangrene  Baseline mentation is alert and oriented x 4   Patient was noted to have altered mental status on 7/30 and there was some concern for stroke   Workup was negative   Current cause considerations   Suspected to be multifactorial secondary to right hallux gangrene status post right partial first ray amputation, status post bilateral femoral endarterectomy with left to right femorofemoral PTFE bypass and retrograde left JACI, left EIA stenting, and bilateral groin VAC placement, acute pain, acute liver failure without hepatic coma (LFTs improving from last set of labs on 8/4), sleep disturbances, vision impairment, and prolonged hospitalization   Patient was noted to have lactic acidosis the night of 7/30 which has since resolved  UA negative for UTI on admission   Leukocytosis worsening on labs today (up to 25.59 from 16.63)  ID was following but has since signed off   Hemoglobin down to 8.7 from 10.1 on 8/4  Patient is alert and awake on my exam today but she remains disoriented   She is oriented to person and place but is disoriented to time   She notes that she is extremely tired but admits to sleeping last night   Identify and treat reversible causes of confusion including infection, dehydration, electrolyte imbalance, anemia, hypoxia, urinary retention, constipation, pain, and sleep disturbance  Maintain delirium precautions   Provide redirection, reorientation, and distraction techniques  Maintain fall and safety precautions   Assist with ADLs/IADLs  Avoid deliriogenic medications such as tramadol, benzodiazepines, anticholinergics, benadryl  Treat pain using geriatric pain protocol   Encourage oral hydration and nutrition   Monitor for constipation and urinary retention    Implement sleep hygiene and limit night time interuptions   Maintain sleep-wake cycle   Encourage early and frequent mobilization   Most recent EKG on 7/30/2024 revealed a QTc interval of 525  Medication at this time is extremely limited for acute agitation and behaviors   Would avoid antipsychotics for acute agitation and behaviors secondary to prolonged QTc interval   Would also avoid Depakote due to acute liver failure   While not typically recommended in geriatric patients can consider low dose lorazepam 0.25 mg if needed for acute agitation and behaviors as other options are not feasible at this time   Redirect and reorient as needed  Keep mentally, physically, and socially active    Cognitive Screening   Per chart review, it appears that the patient has a prior history of memory loss, however, I do not see any workup for this diagnosis and it does not appear as a documented diagnosis in [facility]  Patients son notes the patient is alert and oriented x 4 at baseline and has no issues or difficulties with her memory   Patient is alert and awake on my exam today but she remains disoriented and confused   She is oriented to person and place but is disoriented to time   Most recent TSH on 7/27/2024 noted to be 2.309  Most recent vitamin B 12 level on 2/25/2019 noted to be 502  Consider checking on routine labs   CT of the head on 7/30/2024 revealed chronic left PCA infarct and microangiopathic changes   No MoCA or cognitive testing noted in epic  Maintain delirium precautions as discussed below  Redirect and reorient as needed  Keep physically, mentally, and socially active     Deconditioning   Patient is at increased risk for deconditioning secondary to right hallux gangrene status post right partial first ray amputation, status post bilateral femoral endarterectomy with left to right femorofemoral PTFE bypass and retrograde left JACI, left EIA stenting, and bilateral groin VAC placement, anesthesia, acute pain,  acute blood loss anemia (hemoglobin improving on labs from 8/4), weakness, gait dysfunction, and prolonged hospitalization   Continue to optimize diet, hydration, and mobility for healing   Stage IV Chronic Kidney Disease   Baseline creatinine appears to be 1.2-1.8  Patient now with RICARDO which appears to have resolved (creatinine 1.58 on labs today)  Patient does follow with neurology in the outpatient setting   Nephrology had been consulted but has since signed off   Avoid nephrotoxic medication and renal dose medication   Keep hydrated   PO intake  Patient notes she has a decreased appetite   She notes that she used to have a very good appetite when she was on insulin but since being transitioned to oral tradjenta her appetite has decreased   She does drink carnation breakfast drinks at home  She is receiving glucerna with meals here  Encourage oral hydration and nutrition   Congestive heart failure   Most recent echo on 7/21/2024 revealed an EF of 41%  Patient does not appear to be on any diuretics at baseline  She is currently on torsemide 10 mg daily though this was held on 7/30 and 7/31 due to low blood pressures   Cardiology is consulted and following  Recommend low-sodium diet  Continue to monitor weights and I&O  Management per cardiology  Type II Diabetes   Hemoglobin A1c on 7/9/2024 noted to be 5.9 which is pretty tightly controlled for patient's age  Patient has been on insulin in the past but once her A1c was improved she was transitioned over to Tradjenta which she and her son note have affected her appetite  Patient is currently on blood sugar checks and while blood sugars have been elevated they are acceptable  Her current regimen is Humalog 5 units + SSI with meals and Levemir 10 units daily  Continue insulin and diabetic diet  Avoid hypoglycemia  Acute Blood Loss Anemia  Baseline hemoglobin appears to be 11-13  She has been primarily trending in the 7's for the past several days  She was noted to  have a hemoglobin of 6.4 on 7/30 and received a unit of blood  Her hemoglobin on 7/31 was 7.4 and she received another unit of blood  Hemoglobin down to 8.7 on labs today   Continue to monitor CBC  Transfuse for hemoglobin < 7   Monitor for signs and symptoms of infection, dehydration, DVT, and skin breakdown    Frailty   Clinical Frail Scale: 5- Mildly Frail (baseline)  More evident slowing, needs help high order IADLs (transport, bills, medications)  Progressively impairs shopping and walking outside alone, meal prep and housework  Clinical Frail Scale: 6- Moderately Frail (current)  Need help with all outside activities  Need help with stairs and bathing  May need assistance with dressing  Most recent albumin on labs today noted to be 2.9  Consider nutrition consult  Encourage protein supplementation     Ambulatory Dysfunction/Falls  Patient has not had any recent falls at home  She notes she has both a roller walker and cane at home  Her son notes that she has an electric scooter that she primarily uses and then will supplement with the cane when ambulating   PT/OT consulted to assist with strengthening/mobility and assist with discharge planning to appropriate level of care  Assess patient frequently for physical needs, encourage use of assistant devices as needed and directed by PT/OT  Identify cognitive and physical deficits and behaviors that affect risk of falls  Consider moving patient closer to nursing station to monitor more closely for impulsive behavior which may increase risk of falls  [place] fall precautions   Educate patient/family on patient safety including physical limitations and importance of using call bell for assistance   Modify environment to reduce risk of injury including disconnecting from pole when not in use, ensuring adequate lighting in room and restroom, ensuring that path to restroom is clear and free of trip hazards  Out of bed as tolerated     Impaired Vision   Patient does  have vision impairment   She does wear eyeglasses  Recommend use of corrective lenses at all appropriate times  Encourage adequate lighting and encourage use of assistance with ambulation  Keep personal belongings close to avoid reaching  Encourage appropriate footwear at all times  Recommend large font for printed materials provided to patient    Dentition/Appetite   Patient does not wear dentures at baseline   She notes that her appetite is fair as discussed above   Ensure meal consistency is appropriate for all abilities   Consider nutrition consult   Continue aspiration precautions     Elimination   Patient is continent of bowel and bladder at baseline  She has been recently having incontinent episodes here   Suspect this may be related to encephalopathy   Patient has no documented history of constipation   Last documented bowel movement was yesterday   She had been receiving lactulose TID but this appears to have been discontinued   Monitor for constipation and urinary retention     Insomnia   Patient notes she has some difficulty sleeping at home   She notes that she sleeps in a recliner chair at baseline   She is on trazodone 50 mg daily at bedtime at baseline   Her son noted that he works overnights and does not get home until around 0300 and she is often awake when he gets home and she sleeps on his schedule  Trazodone is currently on hold   Nursing notes that the patient slept last night   Can consider restarting trazodone at bedtime if needed now that mental status is improved  First line is behavioral therapy   Avoid sedative hypnotics including benzodiazepines and benadryl  Encourage staying awake during the day   Encourage daytime activities and morning exercise   Decrease or eliminate daytime naps   Avoid caffeine especially during late afternoon and evening hours  Establish a nighttime routine  Implement sleep hygiene and limit nighttime interruptions    Anxiety/Depression  Patient has a documented  history of depression   She is on escitalopram at baseline   Mood appears stable on exam today   Continue current medication regimen    Continue supportive care     Right Hallux Gangrene  Patient noted to have gangrene of the right hallux on admission to [facility]  She was transferred to [facility] for surgical vascular intervention  She was subsequently evaluated by podiatry for this and is now POD 9 from a right partial first ray amputation  Intraoperative cultures were positive for 3+ Morganella morganii and 1+ growth of mixed skin jun   Patient had been on antibiotics but these have since been discontinued   ID had been following but has since signed off   Dressing changes to be completed by podiatry  Patient is currently denying pain on exam today  Pain management as discussed below  PT/OT consulted and following  Management per podiatry    Aortoiliac Occlusive Disease   Patient with PAD with occlusion of multiple vessels  Patient was transferred from [facility] to [facility] for surgical vascular intervention  Patient is now POD 14 from a bilateral femoral endarterectomy with left to right femorofemoral PTFE bypass and retrograde left JACI, left EIA stenting, and bilateral groin VAC placement   Wound VAC remains intact  Patient was to return to the OR last week, however, this was postponed secondary to encephalopathy   Patient was tentatively returning to the OR on 8/6, however, it now appears that further intervention is on hold indefinitely due to patients medical condition and her desire to not undergo any further surgical intervention at this time   Patient is currently denying pain  Pain management as discussed below  Management per vascular surgery    Acute Liver Failure without Hepatic Coma  Patient with elevated LFTs suspected to be related to acute illness and cephalosporins  There was some concern for shock on 7/30 as the patient was anemic and had elevated  troponins  Ammonia level on 7/30 noted to be elevated at 73  Patient was started on lactulose last week and ammonia level on labs on 7/31 noted to be improved 48  Lactulose has since been discontinued   GI is consulted and following  An ultrasound of the right upper quadrant on 7/31 revealed the common duct is mildly dilated at 8 mm  Recommended nonemergent MRCP for persistent LFT abnormality  LFTs have significantly improved  Continue to monitor liver function   Management per primary team     Elevated Troponins  Patient was noted to have elevated troponins on 7/30  She was noted to be tachycardic and anemic  She did receive a unit of blood on 7/30 and anemia improved  She had been on telemetry but this has since been discontinued   Cardiology is consulted and following  Management per cardiology and primary team    Acute Pain due to Surgery  Patient is POD 9 from a right partial first ray amputation and POD 14 from a bilateral femoral endarterectomy with left to right femorofemoral PTFE bypass and retrograde left JACI, left EIA stenting, and bilateral groin VAC placement   She is currently denying pain on exam today  Would recommend treating pain using the geriatric pain protocol as discussed below  Oxycodone 2.5 mg po Q 4 prn moderate pain  Oxycodone 5 mg po Q 4 prn severe pain   Dilaudid 0.2 mg IV Q 4 prn breakthrough pain  Would avoid Tylenol and gabapentin at this time secondary to acute liver failure and RICARDO  Monitor for constipation  Continue nonpharmacological methods of pain management      Subjective:   The patient is being seen and evaluated today at the bedside for geriatric follow-up.  She is noted to be lying in bed comfortably in no acute distress. She states she is feeling very tired today and just wants to be left alone to sleep. She is oriented to person and place but is disoriented to time. She denies chest pain and shortness of breath. She did not eat breakfast this morning. She notes she slept  "okay last night.     Care was coordinated with patients nurse Stefania. She notes no acute issues or events overnight.  She states that the patient was complaining of abdominal pain this morning and she did administer pain medication for this.       Review of Systems   Constitutional:  Positive for activity change, appetite change (decreased appetite) and fatigue.   HENT:  Negative for dental problem and hearing loss.    Eyes:  Positive for visual disturbance (glasses).   Respiratory:  Negative for cough and shortness of breath.    Cardiovascular:  Negative for chest pain and leg swelling.   Gastrointestinal:  Negative for abdominal distention and abdominal pain.   Genitourinary:  Negative for difficulty urinating and dysuria.   Musculoskeletal:  Positive for gait problem. Negative for arthralgias.   Skin:  Negative for color change and pallor.   Neurological:  Positive for weakness. Negative for speech difficulty.   Psychiatric/Behavioral:  Positive for confusion. Negative for agitation and sleep disturbance. The patient is not nervous/anxious.          Objective:     Vitals: Blood pressure 152/78, pulse 98, temperature (!) 97.2 °F (36.2 °C), resp. rate 18, height 4' 10\" (1.473 m), weight 65 kg (143 lb 4.8 oz), SpO2 95%.,Body mass index is 29.95 kg/m².      Intake/Output Summary (Last 24 hours) at 8/7/2024 1034  Last data filed at 8/7/2024 1001  Gross per 24 hour   Intake 320 ml   Output 240 ml   Net 80 ml       Current Medications: Reviewed    Physical Exam:   Physical Exam  Vitals and nursing note reviewed.   Constitutional:       General: She is not in acute distress.     Appearance: She is not ill-appearing.   HENT:      Head: Normocephalic.      Mouth/Throat:      Mouth: Mucous membranes are dry.   Eyes:      General: No scleral icterus.     Conjunctiva/sclera: Conjunctivae normal.   Cardiovascular:      Rate and Rhythm: Normal rate and regular rhythm.   Pulmonary:      Effort: Pulmonary effort is normal. No " "respiratory distress.   Abdominal:      General: Bowel sounds are normal. There is no distension.      Palpations: Abdomen is soft.      Tenderness: There is no abdominal tenderness.   Musculoskeletal:         General: No swelling or tenderness.   Skin:     General: Skin is warm and dry.      Comments: Wound VAC to groin   Right foot dressing clean, dry, and intact   Neurological:      Mental Status: She is alert.      Motor: Weakness present.      Gait: Gait abnormal.      Comments: Oriented to person and place  Disoriented to time          Invasive Devices       Peripheral Intravenous Line  Duration             Peripheral IV 07/31/24 Right Antecubital 7 days                    Lab, Imaging and other studies: I have personally reviewed pertinent reports.      Please note:  Voice-recognition software may have been used in the preparation of this document.  Occasional wrong word or \"sound-alike\" substitutions may have occurred due to the inherent limitations of voice recognition software.  Interpretation should be guided by context.    "

## 2024-08-07 NOTE — PROGRESS NOTES
[facility] Procedure Note     Date: 8/7/24   Time: 14:30     Location of wound: bilateral groin     Sponges removed:  4 Black Sponges  0 White Sponges     Dimensions of wound:    R: 6.5cm x 3.2 cm  L: 5.2 c 2.3 cm    Description of wound:   Light pink wound bed with slough, and fatty tissue L>R. Serous drainage. No malodor    Continue to monitor +/- washout      RIGHT        LEFT     Sponges placed:  4 Black Sponges (single black sponge to each groin with bridge sponges)  0 White Sponges     VAC settings:  Y connector   125 mmHg  Continuous     Pt tolerated procedure well  VAC sticker placed to wound dressing, per protocol    GRACIELA Brown  Vascular Surgery  [phone]

## 2024-08-07 NOTE — ASSESSMENT & PLAN NOTE
80 yo female ex-smoker w/ a hx of obesity, CKD IV, DM II w/ peripheral neuropathy, HLD, HTN, CAD, L MCA/PCA CVA S/P L TCAR 9/7/23 (Sabino), FÉLIX, mesenteric artery stenosis and aortoiliac occlusive disease w/ PAD and chronic R hallux dry gangrene presented to [facility] on 7/16/24 w/ worsening R great toe pain and R hallux gangrene. Pt was transferred to [facility] on 7/19 w/ plans for R femoral endarterectomy w/ retrograde stenting and RLE bypass.     - s/p B/L CFAE, L JACI/EIA balloon angio and shockwave lithotripsy w/ insertion of a L EIA drug-coated stent and L JACI VBX stent, L to R PTFE fem-fem bypass, and B/L groin vac placement on 7/24.   - s/p right hallux amputation 7/29/24  - s/p 3U PRBC and 1U FFP intra-op and 1U PRBC on 7/30 and 7/31.    Plan was to return to the OR for right femoral to BK-pop bypass.  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       Imaging:  -7/30/24 CT head: Chronic left PCA territory infarct. No evidence of acute infarct, intracranial hemorrhage or mass.  -7/29/24 LEAD: Right: Evidence of patent endarterectomy with diffuse disease noted throughout the femoral-popliteal arteries without significant focal stenosis. LISA: 0.53/48/22. Left: Evidence of patent endarterectomy with diffuse disease noted throughout the femoral-popliteal arteries without significant focal stenosis. L LISA: 0.57/10/43  -7/29/24 Xray R foot: Increasing volume of air within the soft tissues of the first toe in this patient with provided history of gangrene. The previously described cortical destruction along the ventral aspect of the first distal phalanx is not well visualized on the current examination, likely attributed to  differences in patient positioning.  -7/22/24 Xray R foot: Cortical destruction along the ventral aspect of the first distal phalanx worrisome for acute osteomyelitis. Large air within the overlying soft tissues.   -7/21/24 Echo: Left ventricular wall thickness is severely increased. The left ventricular ejection fraction is 41% by biplane measurement. Systolic function is mildly reduced. Global longitudinal strain is reduced at -10% with moderate to severe strain reduction in the ED mid to basal anteroseptal septal and inferior walls.. Unable to grade diastolic dysfunction given the severe degree of mitral annular calcification.   -7/18/24 CTA abd w/ runoff: Visualized descending thoracic and suprarenal abdominal aorta demonstrate marked soft plaque with multifocal regions of plaque ulceration. A point of relative stenosis within the infrarenal abdominal aorta measures up to 7 mm in diameter. Bilateral multifocal severe stenosis within the external iliac and common femoral arteries. Bilateral long segment SFA occlusion extending from the ostia to the level of the P1 popliteal artery. Three-vessel runoff on the right, the posterior tibial artery is dominant. Two-vessel runoff on the left, the peroneal artery is dominant. Segmental visualization of the posterior tibial artery. Interval increase in size of an exophytic lesion arising medially from the stomach, again consistent with gastrointestinal stromal tumor. New dilation of the pancreatic duct within the pancreatic head, no obvious pancreatic/ampulla lesion identified.  -7/16/24 LEAD: Right: Severe diffuse disease noted throughout the femoral-popliteal arteries with high grade stenosis vs occlusion of the proximal-distal SFA with reconstitution to the popliteal artery and high grade stenosis vs occlusion of the distal YOVANY. R LISA: 0.11/-/-. Left: Severe diffuse disease noted throughout the femoral-popliteal arteries with high grade stenosis vs occlusion of the  proximal-mid SFA with reconstitution in the distal SFA and high grade stenosis vs occlusion of the entire PTA. L LISA: 0.31/20/19.     Labs: 8/7  - WBC 25.59 (16.63 8/4)  - Hgb  8.7 (10.1 8/4)  - sCr 1.58/ eGFR 31    Plan:   -Advanced calcific atherosclerotic AIOD/PAD w/ CTLI and R hallux dry gangrene (+) OM now s/p R hallux amputation w/ partial 1st ray resection by podiatry on 7/29  -S/P B/L CFAE, L JACI/EIA balloon angio and shockwave lithotripsy w/ insertion of a L EIA drug-coated stent and L JACI VBX stent, L to R PTFE fem-fem bypass, and B/L groin vac placement on 7/24 (POD #14)  -B/L groin wound vacs intact w/ adequate suction; serous drainage, continue wound vac changes qMWF  -No plan for additional vascular intervention at this time. Plan to discharge to nursing facility with open toe amp and b/l groin VACs. Follow up with vascular in 2 weeks to determine next steps of right fem pop bypass   -ID following for ABX management, input appreciated; surgical cultures growing Morganella morganii  -Cardiology following   -Continue to monitor Hgb and transfuse < 7.0 per primary team. +melena  -Continue brilinta and Eliquis (per cards) and pravastatin for medical management  -GI sign off, LFTs down trending. No indication for further imaging or intervention.  -Geriatrics input appreciated  -Continue medical management per primary team  -PT/OT follownig  -Will discuss w/ Dr. Reynaga

## 2024-08-07 NOTE — ASSESSMENT & PLAN NOTE
Mood stable continue escitalopram   [place] [facility]  Interval Overnight Events:   Male Twin liveborn infant, delivered vaginally     born at 36.2 weeks gestation, now 0d old.  -Twins, baby A in NICU as significant growth discordance between twins; twin B with normal ultrasounds, mom only taking synthroid for hashimoto's but no other meds; no significant FH; no concerns from parents    Physical Exam:   Current Weight: Daily Height/Length in cm: 46.5 (22 Sep 2020 06:50)    Daily Weight Gm: 2770 (22 Sep 2020 04:36)    Vitals Signs:  Vital Signs Last 24 Hrs  T(C): 36.6 (22 Sep 2020 07:35), Max: 36.9 (22 Sep 2020 04:36)  T(F): 97.8 (22 Sep 2020 07:35), Max: 98.4 (22 Sep 2020 04:36)  HR: 140 (22 Sep 2020 07:35) (124 - 140)  BP: 53/36 (22 Sep 2020 06:45) (53/31 - 53/36)  BP(mean): 42 (22 Sep 2020 06:45) (38 - 42)  RR: 38 (22 Sep 2020 07:35) (38 - 58)  SpO2: --  I&O's Detail      Physical Exam:  GEN: NAD alert active  HEENT:  AFOF, +RR b/l, MMM  CHEST: nml s1/s2, RRR, no murmur, lungs cta b/l  Abd: soft/nt/nd +bs no hsm  umbilical stump c/d/i  Hips: neg Ortolani/Daniel  : normal bentley 1 male, testes descended b/l  Neuro: +grasp/suck/moises  Skin: no abnormal rash      Laboratory & Imaging Studies:   POCT Blood Glucose.: 55 mg/dL (20 @ 08:53)  POCT Blood Glucose.: 50 mg/dL (20 @ 06:45)  POCT Blood Glucose.: 55 mg/dL (20 @ 05:58)  POCT Blood Glucose.: 45 mg/dL (20 @ 04:46)  POCT Blood Glucose.: 40 mg/dL (20 @ 04:45)    Total Bilirubin: 2.5 mg/dL  Direct Bilirubin: --    If applicable, bili performed at __ hours of life.  Risk Zone:      Assessment and Plan:    [ X] Normal / Healthy [place]  [ ] GBS Protocol  [X ] Hypoglycemia Protocol for SGA / LGA / IDM / Premature Infant: IDM, BGs OK so far  [ X] Other:     Family Discussion:   [ X] Feeding and baby weight loss were discussed today. Parent's questions were answered.  [X ] Other:   [ ] Unable to speak with family today due to maternal condition.

## 2024-08-07 NOTE — PROGRESS NOTES
[facility]  Progress Note  Name: Anamaria Parnell I  MRN: [id number]  Unit/Bed#: [place] [address] -01 I Date of Admission: 7/19/2024   Date of Service: 8/7/2024 I Hospital Day: 19    Assessment & Plan   * Aortoiliac occlusive disease (HCC)  Assessment & Plan  History of PAD CAD diabetes and hypertension presented to [facility] for right great toe gangrene transferred here for vascular bypass.  This admission: Status post bilateral femoral endarterectomy with bypass and JACI/EIA stenting  VAC still present.  Continue aspirin and Brilinta.  Gangrene of toe/cellulitis.  Status post first ray amputation.  Completed course of ceftriaxone per ID.  No further vascular/podiatry interventions anticipated.  Disposition: To rehab after GI reevaluation    Acute blood loss anemia  Assessment & Plan  Acute blood loss anemia from procedures.  Has received 5 units of PRBC since admission.  Patient hemoglobin is dropping.  Reportedly there is still some melanotic stools.  Discussed with GI regarding possible endoscopy    Results from last 7 days   Lab Units 08/07/24  0543 08/04/24  0434 08/03/24  2209 08/02/24  0520   HEMOGLOBIN g/dL 8.7* 10.1* 10.0* 10.9*       Acute liver failure without hepatic coma  Assessment & Plan  Shock liver related to anemia/hypotension.  LFTs normalized    Results from last 7 days   Lab Units 08/07/24  0543 08/04/24  0434 08/02/24  0520 08/01/24  0440   AST U/L 24 52* 362* 814*   ALT U/L 30 103* 246* 330*   TOTAL BILIRUBIN mg/dL 1.11* 0.96 1.19* 1.32*       Encephalopathy  Assessment & Plan  Encephalopathy multifactorial due to prolonged hospitalization/delirium.  Mentation improving    Chronic HFrEF, LVEF 30%, LVIDd 4.6 cm, AHA Stage C  Assessment & Plan  Compensated.  Continue torsemide    Paroxysmal atrial fibrillation (HCC)  Assessment & Plan  New onset atrial fibrillation this admission multifactorial from surgery/anemia.  Did have bradycardia thought secondary to high-dose  metoprolol.  No further episodes.  No need for pacemaker.  Continue metoprolol 25 mg daily.  Anticoagulation: Cardiology started Eliquis.    Coronary artery disease of native artery of native heart with stable angina pectoris ([facility])  Assessment & Plan  CAD with history of PCI.  No chest pain.  On DAPT  Cardiology was continue aspirin and brilinta.    Primary hypertension  Assessment & Plan  Blood pressures are stable.  Off amlodipine and clonidine.  Labetalol changed to metoprolol by cardiology    Type 2 diabetes mellitus, without long-term current use of insulin ([facility])  Assessment & Plan  Lab Results   Component Value Date    HGBA1C 5.9 (H) 07/09/2024     Recent Labs     08/06/24  1555 08/06/24  2108 08/07/24  0807 08/07/24  1120   POCGLU 133 99 132 210*     Stable continue levemir 10 units and lispro 5 units 3 times daily.    Depression, recurrent ([facility])  Assessment & Plan  Mood stable continue escitalopram    Acute renal failure (ARF) ([facility])  Assessment & Plan  Acute kidney injury creatinine 2.37 secondary to sepsis.    Resolved.    Results from last 7 days   Lab Units 08/07/24  0543 08/04/24  0434 08/02/24  0520 08/01/24  0440   BUN mg/dL 67* 73* 69* 77*   CREATININE mg/dL 1.58* 1.52* 1.88* 2.19*   EGFR ml/min/1.73sq m 31 32 25 21       VTE Pharmacologic Prophylaxis: VTE Score: 3 Moderate Risk (Score 3-4) - Pharmacological DVT Prophylaxis Ordered: apixaban (Eliquis).    Mobility:  Basic Mobility Inpatient Raw Score: 8  -HLM Goal: 3: Sit at edge of bed  JH-HLM Achieved: 1: Laying in bed  JH-HLM Goal NOT achieved. Continue with multidisciplinary rounding and encourage appropriate mobility to improve upon JH-HLM goals.    Patient Centered Rounds: I have performed bedside rounds with nursing staff today.  Discussions with Specialists or Other Care Team Provider: Case management GI    Education and Discussions with Family / Patient: Updated  (son) at bedside.    Time Spent for Care:   This time was spent  "on one or more of the following: performing physical exam; counseling and coordination of care; obtaining or reviewing history; documenting in the medical record; reviewing/ordering tests, medications or procedures; communicating with other healthcare professionals and discussing with patient's family/caregivers.    Current Length of Stay: 19 day(s)  Current Patient Status: Inpatient   Certification Statement: The patient will continue to require additional inpatient hospital stay due to GI evaluation  Discharge Plan: Anticipate discharge in 24-48 hrs to rehab facility.    Code Status: Level 3 - DNAR and DNI      Subjective:   Patient seen and examined.  Having a lot of epigastric pains.    Objective:   Vitals: Blood pressure 151/78, pulse 98, temperature (!) 96.7 °F (35.9 °C), temperature source Temporal, resp. rate 16, height 4' 10\" (1.473 m), weight 65 kg (143 lb 4.8 oz), SpO2 98%.    Intake/Output Summary (Last 24 hours) at 8/7/2024 1300  Last data filed at 8/7/2024 1001  Gross per 24 hour   Intake 120 ml   Output 240 ml   Net -120 ml       Physical Exam  Vitals reviewed.   Constitutional:       General: She is not in acute distress.     Appearance: Normal appearance.   HENT:      Head: Atraumatic.   Cardiovascular:      Rate and Rhythm: Regular rhythm.   Pulmonary:      Breath sounds: Decreased breath sounds present. No wheezing.   Abdominal:      General: Bowel sounds are normal.      Palpations: Abdomen is soft.      Tenderness: There is abdominal tenderness. There is no guarding.   Musculoskeletal:         General: Tenderness present. No swelling.   Skin:     General: Skin is warm.   Neurological:      General: No focal deficit present.      Mental Status: She is alert.   Psychiatric:         Mood and Affect: Affect is tearful.       Additional Data:   Labs:  Results from last 7 days   Lab Units 08/07/24  0543 08/04/24  0434 08/03/24  2209 08/02/24  0520   WBC Thousand/uL 25.59* 16.63*  --  25.63* "   HEMOGLOBIN g/dL 8.7* 10.1* 10.0* 10.9*   PLATELETS Thousands/uL 388 283  --  272   MCV fL 101* 97  --  92     Results from last 7 days   Lab Units 24  0543 24  0434 24  0520   SODIUM mmol/L 141  --  141 141   POTASSIUM mmol/L 4.0 4.4 2.9* 3.5   CHLORIDE mmol/L 100  --  99 104   CO2 mmol/L 27  --  32 25   ANION GAP mmol/L 14*  --  10 12   BUN mg/dL 67*  --  73* 69*   CREATININE mg/dL 1.58*  --  1.52* 1.88*   CALCIUM mg/dL 8.7  --  8.4 8.4   ALBUMIN g/dL 2.9*  --  3.0* 3.4*   TOTAL BILIRUBIN mg/dL 1.11*  --  0.96 1.19*   ALK PHOS U/L 77  --  120* 143*   ALT U/L 30  --  103* 246*   AST U/L 24  --  52* 362*   EGFR ml/min/1.73sq m   --  32 25   GLUCOSE RANDOM mg/dL 117  --  135 193*     Results from last 7 days   Lab Units 24  0434 24  0520 24  0440   MAGNESIUM mg/dL 1.8* 2.1 2.3   PHOSPHORUS mg/dL 3.3 3.9 4.3*                      Results from last 7 days   Lab Units 24  1120 24  0807 24  2108 24  1555 24  1128 24  0721 24  2110 24  1600 24  1054 24  0716 24  2117 24  1603   POC GLUCOSE mg/dl 210* 132 99 133 174* 173* 164* 185* 191* 204* 243* 269*             * I Have Reviewed All Lab Data Listed Above.    Recent cultures:             Results from last 7 days   Lab Units 24  2236   FECAL OCCULT BLOOD DIAGNOSTIC  Positive*   FECAL OCCULT BLOOD DIAGNOSTIC 2  Positive*       Lines/Drains:  Invasive Devices       Peripheral Intravenous Line  Duration             Peripheral IV 24 Right Antecubital 7 days                      Telemetry:  Telemetry Orders (From admission, onward)               24 Hour Telemetry Monitoring  Continuous x 24 Hours (Telem)           Question:  Reason for 24 Hour Telemetry  Answer:  Arrhythmias requiring acute medical intervention / PPM or ICD malfunction                     Telemetry Reviewed:   Indication for Continued Telemetry Use:               Telemetry:   Telemetry Orders (From admission, onward)               24 Hour Telemetry Monitoring  Continuous x 24 Hours (Telem)           Question:  Reason for 24 Hour Telemetry  Answer:  Arrhythmias requiring acute medical intervention / PPM or ICD malfunction                      Imaging:  Imaging Reports Reviewed Today Include:   XR chest portable ICU    Result Date: 2024  Impression: ET tube 4 cm above the roberta. Minimal atelectasis in the right midlung. Workstation performed: [id number]     XR chest portable    Result Date: 2024  Impression: Interval development of pulmonary congestion. Small bilateral effusions suspected. Workstation performed: [id number]     XR foot 3+ vw right    Result Date: 2024  Impression: Cortical destruction along the ventral aspect of the first distal phalanx worrisome for acute osteomyelitis. Large air within the overlying soft tissues. The study was marked in EPIC for immediate notification. Workstation performed: [id number]       Scheduled Meds:  Current Facility-Administered Medications   Medication Dose Route Frequency Provider Last Rate    allopurinol  100 mg Oral Daily Costa Omer DPM      apixaban  5 mg Oral BID Dennise Campbell PA-C      escitalopram  20 mg Oral Daily Costa Omer DPM      HYDROmorphone  0.2 mg Intravenous Q6H PRN Costa Omer DPM      insulin detemir  10 Units Subcutaneous Daily Cullen Reinoso MD      insulin lispro  1-5 Units Subcutaneous TID AC Costa Omer DPM      insulin lispro  5 Units Subcutaneous TID With Meals Cullen Reinoso MD      lidocaine  1 patch Topical Daily Costa Omer DPM      metoprolol succinate  25 mg Oral Daily Timothy Rey DO      ondansetron  4 mg Intravenous Q6H PRN Costa Omer DPM      oxyCODONE  5 mg Oral Q6H PRN Costa Omer DPM      pantoprazole  40 mg Oral BID GRACIELA Ward      sodium bicarbonate  650 mg Oral BID after  meals Costa Omer DPM      ticagrelor  90 mg Oral Q12H JAVIER Costa Omer DPM      torsemide  10 mg Oral Daily Costa Omer DPM         Today, Patient Was Seen By: Akira Flores DO    ** Please Note: Dictation voice to text software may have been used in the creation of this document. **

## 2024-08-07 NOTE — WOUND OSTOMY CARE
Progress Note - Wound   Anamaria Parnell 79 y.o. female MRN: [id number]  Unit/Bed#: [place] [address] -01 Encounter: [id number]        Assessment:   Patient seen for wound care follow-up.  She is sleepy, but agreeable to assessment.  Requires assistance to turn in bed.  ATR positioning system in place.  Incontinent of bowel and bladder.  Preventative foam dressing intact to left heel.  Nutrition team following, dietary supplements ordered.  Dressing dry and intact to right toe/foot per podiatry team.  VAC dressing intact to bilateral groin incisions per vascular surgery team.  Present on admission stage 2 pressure injury to bilateral buttocks--beefy red tissue with increased depth, measuring slightly larger.  No drainage.  Miley-wound intact with slow to jak erythema and hyperpigmentation.  Hospital acquired wound to sacrum with mixed etiology--likely related to pressure and moisture damage.  Beefy red wound bed with full thickness tissue loss and no drainage.  Miley-wound with slow to jak erythema and hyperpigmentation.    See flowsheet for wound details.     Wound Care Plan:   1-Apply silicone bordered foam dressings to left heel for prevention.  Fabián with P.  Peel back for skin assessments at least daily and re-apply.  Change dressings every 3 days and as needed.  2-Elevate heels off of bed/chair surface to offload pressure.  3-Offloading air cushion in chair when out of bed.  4-Apply moisturizing skin cream to body daily and as needed.  5-Turn and reposition every 2 hours while in bed and weight shift frequently while in the chair to re-distribute pressure on skin.    6-Sacrum/buttocks--cleanse with soap and water, pat dry.  Apply Triad paste three times daily and as needed with incontinence care.  7-Right foot per podiatry.  8-Groin incisions per vascular surgery team.  9-P500 low air-loss mattress.    Wound care team to follow. Plan of care reviewed with primary RN.     Dr. Flores made aware of new sacral wound  and slight increase in buttock wound size with recommendation for P500 low air-loss mattress.    Wound 07/16/24 Pressure Injury Buttocks Left (Active)   Wound Description Beefy red 08/07/24 1439   Miley-wound Assessment Erythema;Hyperpigmented 08/07/24 1439   Wound Length (cm) 0.8 cm 08/07/24 1439   Wound Width (cm) 0.9 cm 08/07/24 1439   Wound Depth (cm) 0.2 cm 08/07/24 1439   Wound Surface Area (cm^2) 0.72 cm^2 08/07/24 1439   Wound Volume (cm^3) 0.144 cm^3 08/07/24 1439   Calculated Wound Volume (cm^3) 0.14 cm^3 08/07/24 1439   Drainage Amount None 08/07/24 1439   Treatments Cleansed 08/07/24 1439   Dressing Protective barrier 08/07/24 1439   Patient Tolerance Tolerated well 08/07/24 1439       Wound 07/19/24 Pressure Injury Buttocks Right (Active)   Wound Image   08/07/24 1439   Wound Description Beefy red 08/07/24 1439   Miley-wound Assessment Hyperpigmented;Erythema 08/07/24 1439   Wound Length (cm) 0.6 cm 08/07/24 1439   Wound Width (cm) 0.5 cm 08/07/24 1439   Wound Depth (cm) 0.2 cm 08/07/24 1439   Wound Surface Area (cm^2) 0.3 cm^2 08/07/24 1439   Wound Volume (cm^3) 0.06 cm^3 08/07/24 1439   Calculated Wound Volume (cm^3) 0.06 cm^3 08/07/24 1439   Change in Wound Size % -50 08/07/24 1439   Drainage Amount None 08/07/24 1439   Treatments Cleansed 08/07/24 1439   Dressing Protective barrier 08/07/24 1439   Patient Tolerance Tolerated well 08/07/24 1439       Wound 08/07/24 Sacrum (Active)   Wound Description Beefy red 08/07/24 1439   Miley-wound Assessment Erythema;Hyperpigmented 08/07/24 1439   Wound Length (cm) 3 cm 08/07/24 1439   Wound Width (cm) 1.2 cm 08/07/24 1439   Wound Depth (cm) 0.2 cm 08/07/24 1439   Wound Surface Area (cm^2) 3.6 cm^2 08/07/24 1439   Wound Volume (cm^3) 0.72 cm^3 08/07/24 1439   Calculated Wound Volume (cm^3) 0.72 cm^3 08/07/24 1439   Drainage Amount None 08/07/24 1439   Non-staged Wound Description Full thickness 08/07/24 1439   Treatments Cleansed 08/07/24 1439   Dressing  Protective barrier 08/07/24 1434   Patient Tolerance Tolerated well 08/07/24 [id number]     Frances Dunne BSN, RN, CWON

## 2024-08-07 NOTE — PROGRESS NOTES
[facility]  Progress Note  Name: Anamaria Parnell I  MRN: [id number]  Unit/Bed#: [place] [address] -01 I Date of Admission: 7/19/2024   Date of Service: 8/7/2024 I Hospital Day: 19    Assessment & Plan   * Aortoiliac occlusive disease (HCC)  Assessment & Plan  78 yo female ex-smoker w/ a hx of obesity, CKD IV, DM II w/ peripheral neuropathy, HLD, HTN, CAD, L MCA/PCA CVA S/P L TCAR 9/7/23 ([facility]), FÉLIX, mesenteric artery stenosis and aortoiliac occlusive disease w/ PAD and chronic R hallux dry gangrene presented to [facility] on 7/16/24 w/ worsening R great toe pain and R hallux gangrene. Pt was transferred to [facility] on 7/19 w/ plans for R femoral endarterectomy w/ retrograde stenting and RLE bypass.     - s/p B/L CFAE, L JACI/EIA balloon angio and shockwave lithotripsy w/ insertion of a L EIA drug-coated stent and L JACI VBX stent, L to R PTFE fem-fem bypass, and B/L groin vac placement on 7/24.   - s/p right hallux amputation 7/29/24  - s/p 3U PRBC and 1U FFP intra-op and 1U PRBC on 7/30 and 7/31.    Plan was to return to the OR for right femoral to BK-pop bypass.  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       Imaging:  -7/30/24 CT head: Chronic left PCA territory infarct. No evidence of acute infarct, intracranial hemorrhage or mass.  -7/29/24 LEAD: Right: Evidence of patent endarterectomy with diffuse disease noted throughout the femoral-popliteal arteries without significant focal stenosis. LISA: 0.53/48/22. Left: Evidence of patent endarterectomy with diffuse disease noted throughout the femoral-popliteal arteries without significant focal stenosis. L LISA: 0.57/10/43  -7/29/24 Xray R foot:  Increasing volume of air within the soft tissues of the first toe in this patient with provided history of gangrene. The previously described cortical destruction along the ventral aspect of the first distal phalanx is not well visualized on the current examination, likely attributed to differences in patient positioning.  -7/22/24 Xray R foot: Cortical destruction along the ventral aspect of the first distal phalanx worrisome for acute osteomyelitis. Large air within the overlying soft tissues.   -7/21/24 Echo: Left ventricular wall thickness is severely increased. The left ventricular ejection fraction is 41% by biplane measurement. Systolic function is mildly reduced. Global longitudinal strain is reduced at -10% with moderate to severe strain reduction in the ED mid to basal anteroseptal septal and inferior walls.. Unable to grade diastolic dysfunction given the severe degree of mitral annular calcification.   -7/18/24 CTA abd w/ runoff: Visualized descending thoracic and suprarenal abdominal aorta demonstrate marked soft plaque with multifocal regions of plaque ulceration. A point of relative stenosis within the infrarenal abdominal aorta measures up to 7 mm in diameter. Bilateral multifocal severe stenosis within the external iliac and common femoral arteries. Bilateral long segment SFA occlusion extending from the ostia to the level of the P1 popliteal artery. Three-vessel runoff on the right, the posterior tibial artery is dominant. Two-vessel runoff on the left, the peroneal artery is dominant. Segmental visualization of the posterior tibial artery. Interval increase in size of an exophytic lesion arising medially from the stomach, again consistent with gastrointestinal stromal tumor. New dilation of the pancreatic duct within the pancreatic head, no obvious pancreatic/ampulla lesion identified.  -7/16/24 LEAD: Right: Severe diffuse disease noted throughout the femoral-popliteal arteries with high grade  "stenosis vs occlusion of the proximal-distal SFA with reconstitution to the popliteal artery and high grade stenosis vs occlusion of the distal YOVANY. R LISA: 0.11/-/-. Left: Severe diffuse disease noted throughout the femoral-popliteal arteries with high grade stenosis vs occlusion of the proximal-mid SFA with reconstitution in the distal SFA and high grade stenosis vs occlusion of the entire PTA. L LISA: 0.31/20/19.     Labs: 8/7  - WBC 25.59 (16.63 8/4)  - Hgb  8.7 (10.1 8/4)  - sCr 1.58/ eGFR 31    Plan:   -Advanced calcific atherosclerotic AIOD/PAD w/ CTLI and R hallux dry gangrene (+) OM now s/p R hallux amputation w/ partial 1st ray resection by podiatry on 7/29  -S/P B/L CFAE, L JACI/EIA balloon angio and shockwave lithotripsy w/ insertion of a L EIA drug-coated stent and L JACI VBX stent, L to R PTFE fem-fem bypass, and B/L groin vac placement on 7/24 (POD #14)  -B/L groin wound vacs intact w/ adequate suction; serous drainage, continue wound vac changes qMWF  -No plan for additional vascular intervention at this time. Plan to discharge to nursing facility with open toe amp and b/l groin VACs. Follow up with vascular in 2 weeks to determine next steps of right fem pop bypass   -ID following for ABX management, input appreciated; surgical cultures growing Morganella morganii  -Cardiology following   -Continue to monitor Hgb and transfuse < 7.0 per primary team. +melena  -Continue brilinta and Eliquis (per cards) and pravastatin for medical management  -GI sign off, LFTs down trending. No indication for further imaging or intervention.  -Geriatrics input appreciated  -Continue medical management per primary team  -PT/OT follownig  -Will discuss w/ Dr. Reynaga           Subjective:  Sitting up in bed eating breakfast.  NAD.  \"I am just so tired\".  Denies pain.  VAC CDI to bilateral groin with adequate suction.  Patient aware of VAC change later today.    Vitals:  /78   Pulse 98   Temp (!) 97.2 °F (36.2 °C) " "  Resp 18   Ht 4' 10\" (1.473 m)   Wt 65 kg (143 lb 4.8 oz)   LMP  (LMP Unknown)   SpO2 96%   BMI 29.95 kg/m²     I/Os:  I/O last 3 completed shifts:  In: 920 [P.O.:920]  Out: 240 [Drains:240]  No intake/output data recorded.    Lab Results and Cultures:   Lab Results   Component Value Date    WBC 25.59 (H) 08/07/2024    HGB 8.7 (L) 08/07/2024    HCT 28.4 (L) 08/07/2024     (H) 08/07/2024     08/07/2024     Lab Results   Component Value Date    GLUCOSE 179 (H) 07/24/2024    CALCIUM 8.7 08/07/2024    K 4.0 08/07/2024    CO2 27 08/07/2024     08/07/2024    BUN 67 (H) 08/07/2024    CREATININE 1.58 (H) 08/07/2024     Lab Results   Component Value Date    INR 1.70 (H) 07/31/2024    INR 2.10 (H) 07/30/2024    INR 1.51 (H) 07/24/2024    PROTIME 20.1 (H) 07/31/2024    PROTIME 23.7 (H) 07/30/2024    PROTIME 18.4 (H) 07/24/2024        Blood Culture:   Lab Results   Component Value Date    BLOODCX No Growth After 5 Days. 07/17/2024   ,   Urinalysis:   Lab Results   Component Value Date    COLORU Light Yellow 07/17/2024    CLARITYU Clear 07/17/2024    SPECGRAV 1.016 07/17/2024    PHUR 5.5 07/17/2024    LEUKOCYTESUR Moderate (A) 07/17/2024    NITRITE Negative 07/17/2024    GLUCOSEU Negative 07/17/2024    KETONESU Negative 07/17/2024    BILIRUBINUR Negative 07/17/2024    BLOODU Negative 07/17/2024   ,   Urine Culture:   Lab Results   Component Value Date    URINECX (A) 07/17/2024     >100,000 cfu/ml - Lactobacillus gasseri Lactobacillus species    URINECX <10,000 cfu/ml Micrococcus luteus (A) 07/17/2024    URINECX (A) 07/17/2024     <10,000 cfu/ml - Lactobacillus rhamnosus Lactobacillus species   ,   Wound Culure: No results found for: \"WOUNDCULT\"    Medications:  Current Facility-Administered Medications   Medication Dose Route Frequency    allopurinol (ZYLOPRIM) tablet 100 mg  100 mg Oral Daily    apixaban (ELIQUIS) tablet 5 mg  5 mg Oral BID    escitalopram (LEXAPRO) tablet 20 mg  20 mg Oral Daily    " HYDROmorphone HCl (DILAUDID) injection 0.2 mg  0.2 mg Intravenous Q6H PRN    insulin detemir (LEVEMIR) subcutaneous injection 10 Units  10 Units Subcutaneous Daily    insulin lispro (HumALOG/ADMELOG) 100 units/mL subcutaneous injection 1-5 Units  1-5 Units Subcutaneous TID AC    insulin lispro (HumALOG/ADMELOG) 100 units/mL subcutaneous injection 5 Units  5 Units Subcutaneous TID With Meals    lidocaine (LIDODERM) 5 % patch 1 patch  1 patch Topical Daily    metoprolol succinate (TOPROL-XL) 24 hr tablet 25 mg  25 mg Oral Daily    ondansetron (ZOFRAN) injection 4 mg  4 mg Intravenous Q6H PRN    oxyCODONE (ROXICODONE) IR tablet 5 mg  5 mg Oral Q6H PRN    pantoprazole (PROTONIX) EC tablet 40 mg  40 mg Oral BID    sodium bicarbonate tablet 650 mg  650 mg Oral BID after meals    ticagrelor (BRILINTA) tablet 90 mg  90 mg Oral Q12H JAVIER    torsemide (DEMADEX) tablet 10 mg  10 mg Oral Daily       Imaging:  As above    Physical Exam:    General appearance: alert and oriented, in no acute distress alert to person place and year not month  Neurologic: Grossly normal  Lungs: even, unlabored. O2 via NC  Heart: regular rate and rhythm, S1, S2 normal, no murmur, click, rub or gallop  Abdomen: soft, non-tender; bowel sounds normal; no masses,  no organomegaly and ecchymotic  Extremities:  Perfused, M/S intact.  Right foot podiatric dressing in place.  Trace edema.  Bilateral Prevalon boots.    Wound/Incision:  B/L groin surgical wound with VAC intact.  Continuous suction 125 mmHg.  Serous drainage.    Right foot partial first ray amp.  Podiatric dressing intact.    Pulse exam:  Radial: Right: 2+ Left:: 2+  Femoral: Right:  VAC  Left:  VAC  DP: Right:  dsg  Left: doppler signal  PT: Right:  dsg  Left:  dsg      GRACIELA Vogt  8/7/2024  [facility], [phone]

## 2024-08-07 NOTE — PLAN OF CARE
Problem: PAIN - ADULT  Goal: Verbalizes/displays adequate comfort level or baseline comfort level  Description: Interventions:  - Encourage patient to monitor pain and request assistance  - Assess pain using appropriate pain scale  - Administer analgesics based on type and severity of pain and evaluate response  - Implement non-pharmacological measures as appropriate and evaluate response  - Consider cultural and social influences on pain and pain management  - Notify physician/advanced practitioner if interventions unsuccessful or patient reports new pain  Outcome: Progressing     Problem: INFECTION - ADULT  Goal: Absence or prevention of progression during hospitalization  Description: INTERVENTIONS:  - Assess and monitor for signs and symptoms of infection  - Monitor lab/diagnostic results  - Monitor all insertion sites, i.e. indwelling lines, tubes, and drains  - Monitor endotracheal if appropriate and nasal secretions for changes in amount and color  - [place] appropriate cooling/warming therapies per order  - Administer medications as ordered  - Instruct and encourage patient and family to use good hand hygiene technique  - Identify and instruct in appropriate isolation precautions for identified infection/condition  Outcome: Progressing     Problem: SAFETY ADULT  Goal: Patient will remain free of falls  Description: INTERVENTIONS:  - Educate patient/family on patient safety including physical limitations  - Instruct patient to call for assistance with activity   - Consult OT/PT to assist with strengthening/mobility   - Keep Call bell within reach  - Keep bed low and locked with side rails adjusted as appropriate  - Keep care items and personal belongings within reach  - Initiate and maintain comfort rounds  - Make Fall Risk Sign visible to staff  - Offer Toileting every 2 Hours, in advance of need  - Initiate/Maintain bed alarm  - Apply yellow socks and bracelet for high fall risk patients  - Consider  moving patient to room near nurses station  Outcome: Progressing  Goal: Maintain or return to baseline ADL function  Description: INTERVENTIONS:  -  Assess patient's ability to carry out ADLs; assess patient's baseline for ADL function and identify physical deficits which impact ability to perform ADLs (bathing, care of mouth/teeth, toileting, grooming, dressing, etc.)  - Assess/evaluate cause of self-care deficits   - Assess range of motion  - Assess patient's mobility; develop plan if impaired  - Assess patient's need for assistive devices and provide as appropriate  - Encourage maximum independence but intervene and supervise when necessary  - Involve family in performance of ADLs  - Assess for home care needs following discharge   - Consider OT consult to assist with ADL evaluation and planning for discharge  - Provide patient education as appropriate  Outcome: Progressing  Goal: Maintains/Returns to pre admission functional level  Description: INTERVENTIONS:  - Perform AM-PAC 6 Click Basic Mobility/ Daily Activity assessment daily.  - Set and communicate daily mobility goal to care team and patient/family/caregiver.   - Collaborate with rehabilitation services on mobility goals if consulted  - Stand patient 3 times a day  - Ambulate patient 3 times a day  - Out of bed to chair 3 times a day   - Out of bed for meals 3 times a day  - Out of bed for toileting  - Record patient progress and toleration of activity level   Outcome: Progressing     Problem: DISCHARGE PLANNING  Goal: Discharge to home or other facility with appropriate resources  Description: INTERVENTIONS:  - Identify barriers to discharge w/patient and caregiver  - Arrange for needed discharge resources and transportation as appropriate  - Identify discharge learning needs (meds, wound care, etc.)  - Arrange for interpretive services to assist at discharge as needed  - Refer to Case Management Department for coordinating discharge planning if the  patient needs post-hospital services based on physician/advanced practitioner order or complex needs related to functional status, cognitive ability, or social support system  Outcome: Progressing     Problem: Knowledge Deficit  Goal: Patient/family/caregiver demonstrates understanding of disease process, treatment plan, medications, and discharge instructions  Description: Complete learning assessment and assess knowledge base.  Interventions:  - Provide teaching at level of understanding  - Provide teaching via preferred learning methods  Outcome: Progressing

## 2024-08-07 NOTE — ASSESSMENT & PLAN NOTE
Acute kidney injury creatinine 2.37 secondary to sepsis.    Resolved.    Results from last 7 days   Lab Units 08/07/24  0543 08/04/24  0434 08/02/24  0520 08/01/24  0440   BUN mg/dL 67* 73* 69* 77*   CREATININE mg/dL 1.58* 1.52* 1.88* 2.19*   EGFR ml/min/1.73sq m 31 32 25 21

## 2024-08-07 NOTE — PROGRESS NOTES
Progress Note- Anamaria Parnell 79 y.o. female MRN: [id number]    Unit/Bed#: [place] [address] -01 Encounter: [id number]      Assessment and Plan:    Ms. Anamaria Parnell is a 79-year-old female with a PMHx of PAD, basilar artery stenosis, CVA, hypertension, CAD, A-fib, CHF, CKD, and diabetes who initially presented with right toe pain.  Ultimately found to have right-sided lower extremity gangrene in setting of PAD.  Patient now status post vascular intervention with femoral endarterectomy with stent placement (7/24) along with right hallux amputation with podiatry (7/29).  Hospital course previously complicated by ischemic hepatopathy with acute elevations in liver chemistry for which GI was initially consulted.  LFTs had normalized and GI signed off.  However, GI asked to reevaluate patient given concern for ongoing anemia and melena.     Melena   Acute on Chronic Anemia   Abdominal Pain   Chronic AP/AC Use    Over the weekend, patient noted to have development of black/tarry stools.  Hemoglobin also noted to have acutely declined.  Hemoglobin has been stable around 10 but declined to 8.7 today.  Melena has also been accompanied by epigastric abdominal pain that has been persistent.  Given ongoing complaints and persistent melena, will plan to further evaluate with EGD especially considering need for ongoing AP/AC use.  Differential at this time includes PUD, gastritis/esophagitis, ischemic injury, AVM, [place]ul[place]oy, and unable to fully rule out malignancy.  Will transition patient to PPI therapy and plan to complete EGD tomorrow.  In the interim, continue on with close hemodynamic monitoring.  If patient with change in clinical stability, may require more urgent endoscopic evaluation.    Plan:  Will plan for endoscopic intervention with EGD tomorrow; NPO at midnight  Okay to continue on Protonix 40 mg IV BID  Okay to continue Eliquis/Brilinta given recent vascular intervention; avoid use of all NSAIDs  Trend H&H; transfuse  for goal of >7.0   Monitor hemodynamics; avoid episodes of hypotension   Maintain adequate IV access with 2 large bore Ivs  Monitor bowel movements; alert GI team of signs of overt GIB  Additional pain and symptom management per primary team     ______________________________________________________________________    Subjective:     Seen and evaluated at bedside.  Lying in bed comfortably in no acute distress or visible discomfort.  Son present at bedside.  Patient lethargic and falling asleep during encounter.  Patient does continue to complain of pain in epigastric area.  Denies any nausea/vomiting.  Offers no additional complaints.    Medication Administration - last 24 hours from 08/06/2024 1623 to 08/07/2024 1623         Date/Time Order Dose Route Action Action by     08/07/2024 0931 EDT allopurinol (ZYLOPRIM) tablet 100 mg 100 mg Oral Given Stefania Cheung RN     08/07/2024 0930 EDT escitalopram (LEXAPRO) tablet 20 mg 20 mg Oral Given Stefania Cheung RN     08/07/2024 0931 EDT lidocaine (LIDODERM) 5 % patch 1 patch 1 patch Topical Medication Applied Stefania Cheung RN     08/07/2024 0931 EDT ticagrelor (BRILINTA) tablet 90 mg 90 mg Oral Given Stefania Cheung RN     08/06/2024 2116 EDT ticagrelor (BRILINTA) tablet 90 mg 90 mg Oral Given Liya Lamb     08/07/2024 0930 EDT oxyCODONE (ROXICODONE) IR tablet 5 mg 5 mg Oral Given Stefania Cheugn RN     08/07/2024 1033 EDT HYDROmorphone HCl (DILAUDID) injection 0.2 mg 0.2 mg Intravenous Given Stefania Cheung RN     08/07/2024 1217 EDT insulin lispro (HumALOG/ADMELOG) 100 units/mL subcutaneous injection 1-5 Units 2 Units Subcutaneous Given Stefania Cheung RN     08/07/2024 0929 EDT insulin lispro (HumALOG/ADMELOG) 100 units/mL subcutaneous injection 1-5 Units -- Subcutaneous Not Given Stefania Cheung RN     08/07/2024 0931 EDT sodium bicarbonate tablet 650 mg 650 mg Oral Given Stefania Cheung RN     08/07/2024 0931 EDT torsemide (DEMADEX) tablet 10 mg 10  "mg Oral Given Stefania Cheung RN     08/07/2024 0930 EDT insulin detemir (LEVEMIR) subcutaneous injection 10 Units 10 Units Subcutaneous Given Stefania Cheung RN     08/07/2024 0931 EDT metoprolol succinate (TOPROL-XL) 24 hr tablet 25 mg 25 mg Oral Given Stefania Cheung RN     08/07/2024 0928 EDT cefTRIAXone (ROCEPHIN) 1,000 mg in dextrose 5 % 50 mL IVPB 0 mg Intravenous Stopped Stefania Cheung RN     08/07/2024 1217 EDT insulin lispro (HumALOG/ADMELOG) 100 units/mL subcutaneous injection 5 Units 5 Units Subcutaneous Given Stefania Cheung RN     08/07/2024 0931 EDT insulin lispro (HumALOG/ADMELOG) 100 units/mL subcutaneous injection 5 Units 5 Units Subcutaneous Not Given Stefania Cheung RN     08/07/2024 0931 EDT pantoprazole (PROTONIX) EC tablet 40 mg 40 mg Oral Given Stefania Cheung RN     08/06/2024 2116 EDT pantoprazole (PROTONIX) EC tablet 40 mg 40 mg Oral Given Liyaharriet Lamb     08/07/2024 0931 EDT apixaban (ELIQUIS) tablet 5 mg 5 mg Oral Given Stefania Cheung RN            Objective:     Vitals: Blood pressure 152/63, pulse 87, temperature (!) 97.3 °F (36.3 °C), resp. rate 20, height 4' 10\" (1.473 m), weight 65 kg (143 lb 4.8 oz), SpO2 92%.,Body mass index is 29.95 kg/m².      Intake/Output Summary (Last 24 hours) at 8/7/2024 1623  Last data filed at 8/7/2024 1001  Gross per 24 hour   Intake 120 ml   Output 240 ml   Net -120 ml       Physical Exam  Constitutional:       General: She is not in acute distress.     Appearance: She is not toxic-appearing.      Comments: Lethargic and falling asleep during encounter   HENT:      Head: Normocephalic and atraumatic.      Nose: No congestion.   Eyes:      General: No scleral icterus.  Cardiovascular:      Rate and Rhythm: Normal rate.      Pulses: Normal pulses.   Pulmonary:      Effort: Pulmonary effort is normal.   Abdominal:      General: Abdomen is flat. Bowel sounds are normal. There is no distension.      Tenderness: There is no abdominal tenderness. " There is no guarding or rebound.   Musculoskeletal:      Cervical back: Normal range of motion.   Skin:     General: Skin is warm.      Coloration: Skin is not pale.   Neurological:      Mental Status: She is disoriented.   Psychiatric:         Mood and Affect: Mood normal.         Behavior: Behavior normal.           Invasive Devices       Peripheral Intravenous Line  Duration             Peripheral IV 07/31/24 Right Antecubital 7 days                    Lab Results:  No results displayed because visit has over 200 results.          Imaging Studies: I have personally reviewed pertinent imaging studies.

## 2024-08-07 NOTE — CASE MANAGEMENT
[facility] received request for authorization from Care Manager.  Authorization request submitted for: SNF  Facility Name: [facility]   NPI:  [id number]   Facility MD:  Marin Rodriguez   NPI:[id number]   Authorization initiated by contacting insurance:  stella  Via: [employer] Portal   Clinicals submitted via [employer] attachment   Pending Reference #:[id number]     Care Manager notified: soren alarcon     Updates to authorization status will be noted in chart. Please reach out to CM for updates on any clinical information.

## 2024-08-07 NOTE — ASSESSMENT & PLAN NOTE
Lab Results   Component Value Date    HGBA1C 5.9 (H) 07/09/2024     Recent Labs     08/06/24  1555 08/06/24  2108 08/07/24  0807 08/07/24  1120   POCGLU 133 99 132 210*     Stable continue levemir 10 units and lispro 5 units 3 times daily.

## 2024-08-07 NOTE — PLAN OF CARE
Problem: PAIN - ADULT  Goal: Verbalizes/displays adequate comfort level or baseline comfort level  Description: Interventions:  - Encourage patient to monitor pain and request assistance  - Assess pain using appropriate pain scale  - Administer analgesics based on type and severity of pain and evaluate response  - Implement non-pharmacological measures as appropriate and evaluate response  - Consider cultural and social influences on pain and pain management  - Notify physician/advanced practitioner if interventions unsuccessful or patient reports new pain  Outcome: Progressing     Problem: INFECTION - ADULT  Goal: Absence or prevention of progression during hospitalization  Description: INTERVENTIONS:  - Assess and monitor for signs and symptoms of infection  - Monitor lab/diagnostic results  - Monitor all insertion sites, i.e. indwelling lines, tubes, and drains  - Monitor endotracheal if appropriate and nasal secretions for changes in amount and color  - [place] appropriate cooling/warming therapies per order  - Administer medications as ordered  - Instruct and encourage patient and family to use good hand hygiene technique  - Identify and instruct in appropriate isolation precautions for identified infection/condition  Outcome: Progressing     Problem: SAFETY ADULT  Goal: Patient will remain free of falls  Description: INTERVENTIONS:  - Educate patient/family on patient safety including physical limitations  - Instruct patient to call for assistance with activity   - Consult OT/PT to assist with strengthening/mobility   - Keep Call bell within reach  - Keep bed low and locked with side rails adjusted as appropriate  - Keep care items and personal belongings within reach  - Initiate and maintain comfort rounds  - Make Fall Risk Sign visible to staff  - Offer Toileting every 2 Hours, in advance of need  - Initiate/Maintain bed alarm  - Apply yellow socks and bracelet for high fall risk patients  - Consider  moving patient to room near nurses station  Outcome: Progressing  Goal: Maintain or return to baseline ADL function  Description: INTERVENTIONS:  -  Assess patient's ability to carry out ADLs; assess patient's baseline for ADL function and identify physical deficits which impact ability to perform ADLs (bathing, care of mouth/teeth, toileting, grooming, dressing, etc.)  - Assess/evaluate cause of self-care deficits   - Assess range of motion  - Assess patient's mobility; develop plan if impaired  - Assess patient's need for assistive devices and provide as appropriate  - Encourage maximum independence but intervene and supervise when necessary  - Involve family in performance of ADLs  - Assess for home care needs following discharge   - Consider OT consult to assist with ADL evaluation and planning for discharge  - Provide patient education as appropriate  Outcome: Progressing  Goal: Maintains/Returns to pre admission functional level  Description: INTERVENTIONS:  - Perform AM-PAC 6 Click Basic Mobility/ Daily Activity assessment daily.  - Set and communicate daily mobility goal to care team and patient/family/caregiver.   - Collaborate with rehabilitation services on mobility goals if consulted  - Stand patient 3 times a day  - Ambulate patient 3 times a day  - Out of bed to chair 3 times a day   - Out of bed for meals 3 times a day  - Out of bed for toileting  - Record patient progress and toleration of activity level   Outcome: Progressing     Problem: DISCHARGE PLANNING  Goal: Discharge to home or other facility with appropriate resources  Description: INTERVENTIONS:  - Identify barriers to discharge w/patient and caregiver  - Arrange for needed discharge resources and transportation as appropriate  - Identify discharge learning needs (meds, wound care, etc.)  - Arrange for interpretive services to assist at discharge as needed  - Refer to Case Management Department for coordinating discharge planning if the  patient needs post-hospital services based on physician/advanced practitioner order or complex needs related to functional status, cognitive ability, or social support system  Outcome: Progressing     Problem: Knowledge Deficit  Goal: Patient/family/caregiver demonstrates understanding of disease process, treatment plan, medications, and discharge instructions  Description: Complete learning assessment and assess knowledge base.  Interventions:  - Provide teaching at level of understanding  - Provide teaching via preferred learning methods  Outcome: Progressing     Problem: CARDIOVASCULAR - ADULT  Goal: Maintains optimal cardiac output and hemodynamic stability  Description: INTERVENTIONS:  - Monitor I/O, vital signs and rhythm  - Monitor for S/S and trends of decreased cardiac output  - Administer and titrate ordered vasoactive medications to optimize hemodynamic stability  - Assess quality of pulses, skin color and temperature  - Assess for signs of decreased coronary artery perfusion  - Instruct patient to report change in severity of symptoms  Outcome: Progressing     Problem: METABOLIC, FLUID AND ELECTROLYTES - ADULT  Goal: Electrolytes maintained within normal limits  Description: INTERVENTIONS:  - Monitor labs and assess patient for signs and symptoms of electrolyte imbalances  - Administer electrolyte replacement as ordered  - Monitor response to electrolyte replacements, including repeat lab results as appropriate  - Instruct patient on fluid and nutrition as appropriate  Outcome: Progressing  Goal: Fluid balance maintained  Description: INTERVENTIONS:  - Monitor labs   - Monitor I/O and WT  - Instruct patient on fluid and nutrition as appropriate  - Assess for signs & symptoms of volume excess or deficit  Outcome: Progressing  Goal: Glucose maintained within target range  Description: INTERVENTIONS:  - Monitor Blood Glucose as ordered  - Assess for signs and symptoms of hyperglycemia and hypoglycemia  -  Administer ordered medications to maintain glucose within target range  - Assess nutritional intake and initiate nutrition service referral as needed  Outcome: Progressing     Problem: HEMATOLOGIC - ADULT  Goal: Maintains hematologic stability  Description: INTERVENTIONS  - Assess for signs and symptoms of bleeding or hemorrhage  - Monitor labs  - Administer supportive blood products/factors as ordered and appropriate  Outcome: Progressing     Problem: MUSCULOSKELETAL - ADULT  Goal: Maintain or return mobility to safest level of function  Description: INTERVENTIONS:  - Assess patient's ability to carry out ADLs; assess patient's baseline for ADL function and identify physical deficits which impact ability to perform ADLs (bathing, care of mouth/teeth, toileting, grooming, dressing, etc.)  - Assess/evaluate cause of self-care deficits   - Assess range of motion  - Assess patient's mobility  - Assess patient's need for assistive devices and provide as appropriate  - Encourage maximum independence but intervene and supervise when necessary  - Involve family in performance of ADLs  - Assess for home care needs following discharge   - Consider OT consult to assist with ADL evaluation and planning for discharge  - Provide patient education as appropriate  Outcome: Progressing  Goal: Maintain proper alignment of affected body part  Description: INTERVENTIONS:  - Support, maintain and protect limb and body alignment  - Provide patient/ family with appropriate education  Outcome: Progressing     Problem: Prexisting or High Potential for Compromised Skin Integrity  Goal: Skin integrity is maintained or improved  Description: INTERVENTIONS:  - Identify patients at risk for skin breakdown  - Assess and monitor skin integrity  - Assess and monitor nutrition and hydration status  - Monitor labs   - Assess for incontinence   - Turn and reposition patient  - Assist with mobility/ambulation  - Relieve pressure over bony  prominences  - Avoid friction and shearing  - Provide appropriate hygiene as needed including keeping skin clean and dry  - Evaluate need for skin moisturizer/barrier cream  - Collaborate with interdisciplinary team   - Patient/family teaching  - Consider wound care consult   Outcome: Progressing     Problem: Nutrition/Hydration-ADULT  Goal: Nutrient/Hydration intake appropriate for improving, restoring or maintaining nutritional needs  Description: Monitor and assess patient's nutrition/hydration status for malnutrition. Collaborate with interdisciplinary team and initiate plan and interventions as ordered.  Monitor patient's weight and dietary intake as ordered or per policy. Utilize nutrition screening tool and intervene as necessary. Determine patient's food preferences and provide high-protein, high-caloric foods as appropriate.     INTERVENTIONS:  - Monitor oral intake, urinary output, labs, and treatment plans  - Assess nutrition and hydration status and recommend course of action  - Evaluate amount of meals eaten  - Assist patient with eating if necessary   - Allow adequate time for meals  - Recommend/ encourage appropriate diets, oral nutritional supplements, and vitamin/mineral supplements  - Order, calculate, and assess calorie counts as needed  - Recommend, monitor, and adjust tube feedings and TPN/PPN based on assessed needs  - Assess need for intravenous fluids  - Provide specific nutrition/hydration education as appropriate  - Include patient/family/caregiver in decisions related to nutrition  Outcome: Progressing     Problem: NEUROSENSORY - ADULT  Goal: Achieves maximal functionality and self care  Description: INTERVENTIONS  - Monitor swallowing and airway patency with patient fatigue and changes in neurological status  - Encourage and assist patient to increase activity and self care.   - Encourage visually impaired, hearing impaired and aphasic patients to use assistive/communication  devices  Outcome: Progressing

## 2024-08-07 NOTE — ASSESSMENT & PLAN NOTE
History of PAD CAD diabetes and hypertension presented to [facility] for right great toe gangrene transferred here for vascular bypass.  This admission: Status post bilateral femoral endarterectomy with bypass and JACI/EIA stenting  VAC still present.  Continue aspirin and Brilinta.  Gangrene of toe/cellulitis.  Status post first ray amputation.  Completed course of ceftriaxone per ID.  No further vascular/podiatry interventions anticipated.  Disposition: To rehab after GI reevaluation

## 2024-08-07 NOTE — ASSESSMENT & PLAN NOTE
Shock liver related to anemia/hypotension.  LFTs normalized    Results from last 7 days   Lab Units 08/07/24  0543 08/04/24  0434 08/02/24  0520 08/01/24  0440   AST U/L 24 52* 362* 814*   ALT U/L 30 103* 246* 330*   TOTAL BILIRUBIN mg/dL 1.11* 0.96 1.19* 1.32*

## 2024-08-08 LAB
ALBUMIN SERPL BCG-MCNC: 2.8 G/DL (ref 3.5–5)
ALP SERPL-CCNC: 87 U/L (ref 34–104)
ALT SERPL W P-5'-P-CCNC: 25 U/L (ref 7–52)
ANION GAP SERPL CALCULATED.3IONS-SCNC: 12 MMOL/L (ref 4–13)
AST SERPL W P-5'-P-CCNC: 19 U/L (ref 13–39)
BILIRUB SERPL-MCNC: 1.19 MG/DL (ref 0.2–1)
BUN SERPL-MCNC: 76 MG/DL (ref 5–25)
CALCIUM ALBUM COR SERPL-MCNC: 9.5 MG/DL (ref 8.3–10.1)
CALCIUM SERPL-MCNC: 8.5 MG/DL (ref 8.4–10.2)
CHLORIDE SERPL-SCNC: 101 MMOL/L (ref 96–108)
CO2 SERPL-SCNC: 31 MMOL/L (ref 21–32)
CREAT SERPL-MCNC: 1.84 MG/DL (ref 0.6–1.3)
ERYTHROCYTE [DISTWIDTH] IN BLOOD BY AUTOMATED COUNT: 25.3 % (ref 11.6–15.1)
GFR SERPL CREATININE-BSD FRML MDRD: 26 ML/MIN/1.73SQ M
GLUCOSE SERPL-MCNC: 156 MG/DL (ref 65–140)
GLUCOSE SERPL-MCNC: 165 MG/DL (ref 65–140)
GLUCOSE SERPL-MCNC: 165 MG/DL (ref 65–140)
GLUCOSE SERPL-MCNC: 189 MG/DL (ref 65–140)
GLUCOSE SERPL-MCNC: 213 MG/DL (ref 65–140)
HCT VFR BLD AUTO: 27.3 % (ref 34.8–46.1)
HGB BLD-MCNC: 8.3 G/DL (ref 11.5–15.4)
MCH RBC QN AUTO: 31.2 PG (ref 26.8–34.3)
MCHC RBC AUTO-ENTMCNC: 30.4 G/DL (ref 31.4–37.4)
MCV RBC AUTO: 103 FL (ref 82–98)
PLATELET # BLD AUTO: 322 THOUSANDS/UL (ref 149–390)
PMV BLD AUTO: 11.7 FL (ref 8.9–12.7)
POTASSIUM SERPL-SCNC: 3.3 MMOL/L (ref 3.5–5.3)
PROT SERPL-MCNC: 5.2 G/DL (ref 6.4–8.4)
RBC # BLD AUTO: 2.66 MILLION/UL (ref 3.81–5.12)
SODIUM SERPL-SCNC: 144 MMOL/L (ref 135–147)
WBC # BLD AUTO: 22.92 THOUSAND/UL (ref 4.31–10.16)

## 2024-08-08 PROCEDURE — 99233 SBSQ HOSP IP/OBS HIGH 50: CPT | Performed by: INTERNAL MEDICINE

## 2024-08-08 PROCEDURE — 99024 POSTOP FOLLOW-UP VISIT: CPT | Performed by: NURSE PRACTITIONER

## 2024-08-08 PROCEDURE — 99232 SBSQ HOSP IP/OBS MODERATE 35: CPT | Performed by: INTERNAL MEDICINE

## 2024-08-08 PROCEDURE — 97535 SELF CARE MNGMENT TRAINING: CPT

## 2024-08-08 PROCEDURE — 97530 THERAPEUTIC ACTIVITIES: CPT

## 2024-08-08 PROCEDURE — 85027 COMPLETE CBC AUTOMATED: CPT | Performed by: INTERNAL MEDICINE

## 2024-08-08 PROCEDURE — 82948 REAGENT STRIP/BLOOD GLUCOSE: CPT

## 2024-08-08 PROCEDURE — 97110 THERAPEUTIC EXERCISES: CPT

## 2024-08-08 PROCEDURE — 80053 COMPREHEN METABOLIC PANEL: CPT | Performed by: INTERNAL MEDICINE

## 2024-08-08 PROCEDURE — 99232 SBSQ HOSP IP/OBS MODERATE 35: CPT | Performed by: STUDENT IN AN ORGANIZED HEALTH CARE EDUCATION/TRAINING PROGRAM

## 2024-08-08 RX ADMIN — OXYCODONE HYDROCHLORIDE 5 MG: 5 TABLET ORAL at 08:47

## 2024-08-08 RX ADMIN — LIDOCAINE 5% 1 PATCH: 700 PATCH TOPICAL at 08:12

## 2024-08-08 RX ADMIN — INSULIN DETEMIR 10 UNITS: 100 INJECTION, SOLUTION SUBCUTANEOUS at 08:13

## 2024-08-08 RX ADMIN — SODIUM BICARBONATE 650 MG TABLET 650 MG: at 08:14

## 2024-08-08 RX ADMIN — TICAGRELOR 90 MG: 90 TABLET ORAL at 21:44

## 2024-08-08 RX ADMIN — INSULIN LISPRO 1 UNITS: 100 INJECTION, SOLUTION INTRAVENOUS; SUBCUTANEOUS at 16:26

## 2024-08-08 RX ADMIN — METOPROLOL SUCCINATE 25 MG: 25 TABLET, EXTENDED RELEASE ORAL at 08:14

## 2024-08-08 RX ADMIN — TORSEMIDE 10 MG: 10 TABLET ORAL at 08:14

## 2024-08-08 RX ADMIN — ESCITALOPRAM OXALATE 20 MG: 20 TABLET ORAL at 08:14

## 2024-08-08 RX ADMIN — TICAGRELOR 90 MG: 90 TABLET ORAL at 08:14

## 2024-08-08 RX ADMIN — APIXABAN 5 MG: 5 TABLET, FILM COATED ORAL at 16:26

## 2024-08-08 RX ADMIN — PANTOPRAZOLE SODIUM 40 MG: 40 INJECTION, POWDER, FOR SOLUTION INTRAVENOUS at 08:13

## 2024-08-08 RX ADMIN — ALLOPURINOL 100 MG: 100 TABLET ORAL at 08:14

## 2024-08-08 RX ADMIN — PANTOPRAZOLE SODIUM 40 MG: 40 INJECTION, POWDER, FOR SOLUTION INTRAVENOUS at 21:44

## 2024-08-08 RX ADMIN — APIXABAN 5 MG: 5 TABLET, FILM COATED ORAL at 08:14

## 2024-08-08 RX ADMIN — SODIUM BICARBONATE 650 MG TABLET 650 MG: at 16:26

## 2024-08-08 NOTE — ASSESSMENT & PLAN NOTE
Lab Results   Component Value Date    HGBA1C 5.9 (H) 07/09/2024     Recent Labs     08/07/24  2100 08/08/24  0715 08/08/24  1103 08/08/24  1607   POCGLU 179* 189* 165* 156*     Stable continue levemir 10 units and lispro 5 units 3 times daily.

## 2024-08-08 NOTE — OCCUPATIONAL THERAPY NOTE
Occupational Therapy Progress Note     Patient Name: Anamaria Parnell  Today's Date: 8/8/2024  Problem List  Principal Problem:    Aortoiliac occlusive disease (HCC)  Active Problems:    H/o CVA    Tachy-jeff syndrome (HCC)    Acute renal failure (ARF) (HCC)    Depression, recurrent (HCC)    Type 2 diabetes mellitus, without long-term current use of insulin (HCC)    Primary hypertension    Coronary artery disease of native artery of native heart with stable angina pectoris (HCC)    Paroxysmal atrial fibrillation (HCC)    Chronic HFrEF, LVEF 30%, LVIDd 4.6 cm, AHA Stage C    Encephalopathy    Acute liver failure without hepatic coma    Acute blood loss anemia    Melena    Patient refused evaluation          08/08/24 0854   OT Last Visit   OT Visit Date 08/08/24   Note Type   Note Type Treatment   Pain Assessment   Pain Assessment Tool 0-10   Pain Score No Pain   Restrictions/Precautions   Weight Bearing Precautions Per Order Yes   RLE Weight Bearing Per Order NWB   Other Precautions Cognitive;Chair Alarm;Bed Alarm;WBS;Multiple lines;Fall Risk;Pain  (wound vac)   ADL   Grooming Assistance 4  Minimal Assistance   Grooming Deficit Setup;Verbal cueing;Supervision/safety;Increased time to complete;Teeth care;Brushing hair   UB Bathing Assistance 3  Moderate Assistance   UB Bathing Deficit Setup;Verbal cueing;Supervision/safety;Increased time to complete   LB Bathing Assistance 2  Maximal Assistance   LB Bathing Deficit Setup;Verbal cueing;Supervision/safety;Increased time to complete   UB Dressing Assistance 3  Moderate Assistance   UB Dressing Deficit Setup;Verbal cueing;Supervision/safety;Increased time to complete   LB Dressing Assistance 1  Total Assistance   LB Dressing Deficit Setup;Verbal cueing;Supervision/safety;Increased time to complete   Toileting Assistance  1  Total Assistance   Toileting Deficit Use of bedpan/urinal setup   Functional Standing Tolerance   Activity N/A   Bed Mobility   Rolling R 4  Minimal  assistance   Additional items Assist x 1;Bedrails;Increased time required;Verbal cues;LE management   Supine to Sit 2  Maximal assistance   Additional items Assist x 2;Increased time required;Verbal cues;LE management   Additional Comments left seated up in chair following session. call light in reach. chair alarm activated.   Transfers   Sliding Board transfer 1  Dependent   Additional items Assist x 2;Increased time required;Verbal cues   Additional Comments EOB> CHAIR   Functional Mobility   Additional Comments N/A   Cognition   Overall Cognitive Status Impaired   Arousal/Participation Cooperative   Attention Attends with cues to redirect   Orientation Level Oriented to person;Disoriented to time;Disoriented to place;Disoriented to situation   Memory Decreased short term memory;Decreased recall of recent events;Decreased recall of precautions   Following Commands Follows one step commands with increased time or repetition   Activity Tolerance   Activity Tolerance Patient limited by fatigue   Medical Staff Made Aware PT A ZULEYMA Armando Jose   Assessment   Assessment Pt seen for skilled OT tx this date. Tx focused on improving strength, activity tolerance and balance, safety awareness, cognition to increase independence with self care tasks. Pt tolerated session fair. Pt was limited by cognition,weakness and pain. Greeted in supine and agreeable. Bed level ADL completed. Pt performed grooming Th[place], UB dressing/ bathing with modA, LB dressing / bathing total assist , Toileting total assist bed level,  bed mobility rolling minAx1, transfers supine> sit maxAx2, SB transfer EOB> chair dep x 2. Pt demonstrated poor  sitting balance during functional tasks, no LOB noted.Pt required maximal verbal cuing during session to safely complete tasks. Current OT DC recommendations for pt is level 2 resources.   Plan   Treatment Interventions ADL retraining;Functional transfer training;UE strengthening/ROM;Endurance  training;Patient/family training;Equipment evaluation/education;Compensatory technique education;Continued evaluation;Energy conservation   Goal Expiration Date 08/14/24   OT Treatment Day 7   OT Frequency 3-5x/wk   Discharge Recommendation   Rehab Resource Intensity Level, OT II (Moderate Resource Intensity)   Additional Comments  The patient's raw score on the AM-PAC Daily Activity Inpatient Short Form is 12. A raw score of less than 19 suggests the patient may benefit from discharge to post-acute rehabilitation services. Please refer to the recommendation of the Occupational Therapist for safe discharge planning.   AM-PAC Daily Activity Inpatient   Lower Body Dressing 1   Bathing 2   Toileting 1   Upper Body Dressing 2   Grooming 3   Eating 3   Daily Activity Raw Score 12   Daily Activity Standardized Score (Calc for Raw Score >=11) 30.6   AM-PAC Applied Cognition Inpatient   Following a Speech/Presentation 3   Understanding Ordinary Conversation 3   Taking Medications 1   Remembering Where Things Are Placed or Put Away 1   Remembering List of 4-5 Errands 1   Taking Care of Complicated Tasks 1   Applied Cognition Raw Score 10   Applied Cognition Standardized Score 24.98     Anna Wood, OT

## 2024-08-08 NOTE — PROGRESS NOTES
Progress Note - Geriatric Medicine   Anamaria Parnell 79 y.o. female MRN: [id number]  Unit/Bed#: [place] [address] -01 Encounter: [id number]      Assessment/Plan:    Acute Encephalopathy  Presented to the hospital for right great toe pain/gangrene  Baseline mentation is alert and oriented x 4   Patient was noted to have altered mental status on 7/30 and there was some concern for stroke   Workup was negative   Current cause considerations   Suspected to be multifactorial secondary to right hallux gangrene status post right partial first ray amputation, status post bilateral femoral endarterectomy with left to right femorofemoral PTFE bypass and retrograde left JACI, left EIA stenting, and bilateral groin VAC placement, acute pain, acute liver failure without hepatic coma (LFTs improving from last set of labs on 8/4), sleep disturbances, vision impairment, and prolonged hospitalization   Patient was noted to have lactic acidosis the night of 7/30 which has since resolved  UA negative for UTI on admission   Leukocytosis improving on labs today (down to 22.92 from 25.59)  ID was following but has since signed off   Hemoglobin down to 8.3 today from 8.7 yesterday   Patient is alert and awake on my exam today but she remains disoriented   She is oriented to person and place but is disoriented to time   Identify and treat reversible causes of confusion including infection, dehydration, electrolyte imbalance, anemia, hypoxia, urinary retention, constipation, pain, and sleep disturbance  Maintain delirium precautions   Provide redirection, reorientation, and distraction techniques  Maintain fall and safety precautions   Assist with ADLs/IADLs  Avoid deliriogenic medications such as tramadol, benzodiazepines, anticholinergics, benadryl  Treat pain using geriatric pain protocol   Encourage oral hydration and nutrition   Monitor for constipation and urinary retention   Implement sleep hygiene and limit night time interuptions    Maintain sleep-wake cycle   Encourage early and frequent mobilization   Most recent EKG on 7/30/2024 revealed a QTc interval of 525  Medication at this time is extremely limited for acute agitation and behaviors   Would avoid antipsychotics for acute agitation and behaviors secondary to prolonged QTc interval   Would also avoid Depakote due to acute liver failure   While not typically recommended in geriatric patients can consider low dose lorazepam 0.25 mg if needed for acute agitation and behaviors as other options are not feasible at this time   Redirect and reorient as needed  Keep mentally, physically, and socially active    Cognitive Screening   Per chart review, it appears that the patient has a prior history of memory loss, however, I do not see any workup for this diagnosis and it does not appear as a documented diagnosis in [facility]  Patients son notes the patient is alert and oriented x 4 at baseline and has no issues or difficulties with her memory   Patient is alert and awake on my exam today but she remains disoriented and confused   She is oriented to person and place but is disoriented to time   Most recent TSH on 7/27/2024 noted to be 2.309  Most recent vitamin B 12 level on 2/25/2019 noted to be 502  Consider checking on routine labs   CT of the head on 7/30/2024 revealed chronic left PCA infarct and microangiopathic changes   No MoCA or cognitive testing noted in epic  Maintain delirium precautions as discussed below  Redirect and reorient as needed  Keep physically, mentally, and socially active     Deconditioning   Patient is at increased risk for deconditioning secondary to right hallux gangrene status post right partial first ray amputation, status post bilateral femoral endarterectomy with left to right femorofemoral PTFE bypass and retrograde left JACI, left EIA stenting, and bilateral groin VAC placement, anesthesia, acute pain, acute blood loss anemia (hemoglobin improving on labs from 8/4),  weakness, gait dysfunction, and prolonged hospitalization   Continue to optimize diet, hydration, and mobility for healing   Stage IV Chronic Kidney Disease   Baseline creatinine appears to be 1.2-1.8  Patient now with RICARDO which appears to have resolved (creatinine 1.84 on labs today)  Patient does follow with neurology in the outpatient setting   Nephrology had been consulted but has since signed off   Avoid nephrotoxic medication and renal dose medication   Keep hydrated   PO intake  Patient notes she has a decreased appetite   She notes that she used to have a very good appetite when she was on insulin but since being transitioned to oral tradjenta her appetite has decreased   She does drink carnation breakfast drinks at home  She is receiving glucerna with meals here  Encourage oral hydration and nutrition   Congestive heart failure   Most recent echo on 7/21/2024 revealed an EF of 41%  Patient does not appear to be on any diuretics at baseline  She is currently on torsemide 10 mg daily though this was held on 7/30 and 7/31 due to low blood pressures   Cardiology is consulted and following  Recommend low-sodium diet  Continue to monitor weights and I&O  Management per cardiology  Type II Diabetes   Hemoglobin A1c on 7/9/2024 noted to be 5.9 which is pretty tightly controlled for patient's age  Patient has been on insulin in the past but once her A1c was improved she was transitioned over to Tradjenta which she and her son note have affected her appetite  Patient is currently on blood sugar checks and while blood sugars have been elevated they are acceptable  Her current regimen is Humalog 5 units + SSI with meals and Levemir 10 units daily  Continue insulin and diabetic diet  Avoid hypoglycemia  Acute Blood Loss Anemia  Baseline hemoglobin appears to be 11-13  She has been primarily trending in the 7's for the past several days  She was noted to have a hemoglobin of 6.4 on 7/30 and received a unit of blood  Her  hemoglobin on 7/31 was 7.4 and she received another unit of blood  Hemoglobin down to 8.3 on labs today   Continue to monitor CBC  Transfuse for hemoglobin < 7   Monitor for signs and symptoms of infection, dehydration, DVT, and skin breakdown    Frailty   Clinical Frail Scale: 5- Mildly Frail (baseline)  More evident slowing, needs help high order IADLs (transport, bills, medications)  Progressively impairs shopping and walking outside alone, meal prep and housework  Clinical Frail Scale: 6- Moderately Frail (current)  Need help with all outside activities  Need help with stairs and bathing  May need assistance with dressing  Most recent albumin on labs today noted to be 2.9  Consider nutrition consult  Encourage protein supplementation     Ambulatory Dysfunction/Falls  Patient has not had any recent falls at home  She notes she has both a roller walker and cane at home  Her son notes that she has an electric scooter that she primarily uses and then will supplement with the cane when ambulating   PT/OT consulted to assist with strengthening/mobility and assist with discharge planning to appropriate level of care  Assess patient frequently for physical needs, encourage use of assistant devices as needed and directed by PT/OT  Identify cognitive and physical deficits and behaviors that affect risk of falls  Consider moving patient closer to nursing station to monitor more closely for impulsive behavior which may increase risk of falls  [place] fall precautions   Educate patient/family on patient safety including physical limitations and importance of using call bell for assistance   Modify environment to reduce risk of injury including disconnecting from pole when not in use, ensuring adequate lighting in room and restroom, ensuring that path to restroom is clear and free of trip hazards  Out of bed as tolerated     Impaired Vision   Patient does have vision impairment   She does wear eyeglasses  Recommend use of  corrective lenses at all appropriate times  Encourage adequate lighting and encourage use of assistance with ambulation  Keep personal belongings close to avoid reaching  Encourage appropriate footwear at all times  Recommend large font for printed materials provided to patient    Dentition/Appetite   Patient does not wear dentures at baseline   She notes that her appetite is fair as discussed above   Ensure meal consistency is appropriate for all abilities   Consider nutrition consult   Continue aspiration precautions     Elimination   Patient is continent of bowel and bladder at baseline  She has been recently having incontinent episodes here   Suspect this may be related to encephalopathy   Patient has no documented history of constipation   Last documented bowel movement was this morning    She had been receiving lactulose TID but this appears to have been discontinued   Monitor for constipation and urinary retention     Insomnia   Patient notes she has some difficulty sleeping at home   She notes that she sleeps in a recliner chair at baseline   She is on trazodone 50 mg daily at bedtime at baseline   Her son noted that he works overnights and does not get home until around 0300 and she is often awake when he gets home and she sleeps on his schedule  Trazodone is currently on hold   Nursing notes that the patient slept last night   Would hold off on restarting trazodone as patient has been sleeping much of the day and night   First line is behavioral therapy   Avoid sedative hypnotics including benzodiazepines and benadryl  Encourage staying awake during the day   Encourage daytime activities and morning exercise   Decrease or eliminate daytime naps   Avoid caffeine especially during late afternoon and evening hours  Establish a nighttime routine  Implement sleep hygiene and limit nighttime interruptions    Anxiety/Depression  Patient has a documented history of depression   She is on escitalopram at baseline    Patient appears to be fatigued and depressed today   Continue current medication regimen    Continue supportive care     Right Hallux Gangrene  Patient noted to have gangrene of the right hallux on admission to [facility]  She was transferred to [facility] for surgical vascular intervention  She was subsequently evaluated by podiatry for this and is now POD 10 from a right partial first ray amputation  Intraoperative cultures were positive for 3+ Morganella morganii and 1+ growth of mixed skin jun   Patient had been on antibiotics but these have since been discontinued   ID had been following but has since signed off   Dressing changes to be completed by podiatry  Patient is currently denying pain on exam today  Pain management as discussed below  PT/OT consulted and following  Management per podiatry    Aortoiliac Occlusive Disease   Patient with PAD with occlusion of multiple vessels  Patient was transferred from [facility] to [facility] for surgical vascular intervention  Patient is now POD 15 from a bilateral femoral endarterectomy with left to right femorofemoral PTFE bypass and retrograde left JACI, left EIA stenting, and bilateral groin VAC placement   Wound VAC remains intact  Patient was to return to the OR last week, however, this was postponed secondary to encephalopathy   Patient was tentatively returning to the OR on 8/6, however, it now appears that further intervention is on hold indefinitely due to patients medical condition and her desire to not undergo any further surgical intervention at this time   Patient is currently denying pain  Pain management as discussed below  Management per vascular surgery    Acute Liver Failure without Hepatic Coma  Patient with elevated LFTs suspected to be related to acute illness and cephalosporins  There was some concern for shock on 7/30 as the patient was anemic and had elevated troponins  Ammonia level on 7/30 noted to be  elevated at 73  Patient was started on lactulose last week and ammonia level on labs on 7/31 noted to be improved 48  Lactulose has since been discontinued   GI is consulted and following  An ultrasound of the right upper quadrant on 7/31 revealed the common duct is mildly dilated at 8 mm  Recommended nonemergent MRCP for persistent LFT abnormality  LFTs have significantly improved  Continue to monitor liver function   Management per primary team     Elevated Troponins  Patient was noted to have elevated troponins on 7/30  She was noted to be tachycardic and anemic  She did receive a unit of blood on 7/30 and anemia improved  She had been on telemetry but this has since been discontinued   Cardiology is consulted and following  Management per cardiology and primary team    Acute Pain due to Surgery  Patient is POD 10 from a right partial first ray amputation and POD 15 from a bilateral femoral endarterectomy with left to right femorofemoral PTFE bypass and retrograde left JACI, left EIA stenting, and bilateral groin VAC placement   She is complaining of pain today  Would recommend treating pain using the geriatric pain protocol as discussed below  Oxycodone 2.5 mg po Q 4 prn moderate pain  Oxycodone 5 mg po Q 4 prn severe pain   Dilaudid 0.2 mg IV Q 4 prn breakthrough pain  Would avoid Tylenol and gabapentin at this time secondary to acute liver failure and RICARDO  Monitor for constipation  Continue nonpharmacological methods of pain management      Subjective:   The patient is being seen and evaluated today at the bedside for geriatric follow-up.  She is noted to be lying in bed comfortably in no acute distress. She is noted to be confused today and is oriented to person and place. She is complaining of pain and nursing is getting pain medication for her.     Care was coordinated with patients nurse Garcia. He notes no acute issues or events overnight.       Review of Systems   Constitutional:  Positive for activity  "change, appetite change (decreased appetite) and fatigue.   HENT:  Negative for dental problem and hearing loss.    Eyes:  Positive for visual disturbance (glasses).   Respiratory:  Negative for cough and shortness of breath.    Cardiovascular:  Negative for chest pain and leg swelling.   Gastrointestinal:  Negative for abdominal distention and abdominal pain.   Genitourinary:  Negative for difficulty urinating and dysuria.   Musculoskeletal:  Positive for back pain and gait problem. Negative for arthralgias.   Skin:  Negative for color change and pallor.   Neurological:  Positive for weakness. Negative for speech difficulty.   Psychiatric/Behavioral:  Positive for confusion. Negative for agitation and sleep disturbance. The patient is not nervous/anxious.          Objective:     Vitals: Blood pressure 124/63, pulse 94, temperature 97.8 °F (36.6 °C), resp. rate 18, height 4' 10\" (1.473 m), weight 66.7 kg (147 lb 0.8 oz), SpO2 97%.,Body mass index is 30.73 kg/m².      Intake/Output Summary (Last 24 hours) at 8/8/2024 1026  Last data filed at 8/8/2024 0919  Gross per 24 hour   Intake 160 ml   Output 880 ml   Net -720 ml       Current Medications: Reviewed    Physical Exam:   Physical Exam  Vitals and nursing note reviewed.   Constitutional:       General: She is not in acute distress.     Appearance: She is not ill-appearing.   HENT:      Head: Normocephalic.      Mouth/Throat:      Mouth: Mucous membranes are dry.   Eyes:      General: No scleral icterus.     Conjunctiva/sclera: Conjunctivae normal.   Cardiovascular:      Rate and Rhythm: Normal rate and regular rhythm.   Pulmonary:      Effort: Pulmonary effort is normal. No respiratory distress.   Abdominal:      General: Bowel sounds are normal. There is no distension.      Palpations: Abdomen is soft.      Tenderness: There is no abdominal tenderness.   Musculoskeletal:         General: Tenderness (back) present.      Right lower leg: No edema.      Left lower " "leg: No edema.   Skin:     General: Skin is warm and dry.      Comments: Wound VAC to groin   Right foot dressing clean, dry, and intact   Neurological:      Mental Status: She is alert.      Motor: Weakness present.      Gait: Gait abnormal.      Comments: Oriented to person and place  Disoriented to time          Invasive Devices       Peripheral Intravenous Line  Duration             Peripheral IV 07/31/24 Right Antecubital 8 days              Drain  Duration             External Urinary Catheter <1 day                    Lab, Imaging and other studies: I have personally reviewed pertinent reports.      Please note:  Voice-recognition software may have been used in the preparation of this document.  Occasional wrong word or \"sound-alike\" substitutions may have occurred due to the inherent limitations of voice recognition software.  Interpretation should be guided by context.    "

## 2024-08-08 NOTE — ASSESSMENT & PLAN NOTE
80 yo female ex-smoker w/ a hx of obesity, CKD IV, DM II w/ peripheral neuropathy, HLD, HTN, CAD, L MCA/PCA CVA S/P L TCAR 9/7/23 ([facility]), FÉILX, mesenteric artery stenosis and aortoiliac occlusive disease w/ PAD and chronic R hallux dry gangrene presented to [facility] on 7/16/24 w/ worsening R great toe pain and R hallux gangrene. Pt was transferred to [facility] on 7/19 w/ plans for R femoral endarterectomy w/ retrograde stenting and RLE bypass.     - s/p B/L CFAE, L JACI/EIA balloon angio and shockwave lithotripsy w/ insertion of a L EIA drug-coated stent and L JACI VBX stent, L to R PTFE fem-fem bypass, and B/L groin vac placement on 7/24.   - s/p right hallux amputation 7/29/24  - s/p 3U PRBC and 1U FFP intra-op and 1U PRBC on 7/30 and 7/31.    Plan was to return to the OR for right femoral to BK-pop bypass.  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       Imaging:  -7/30/24 CT head: Chronic left PCA territory infarct. No evidence of acute infarct, intracranial hemorrhage or mass.  -7/29/24 LEAD: Right: Evidence of patent endarterectomy with diffuse disease noted throughout the femoral-popliteal arteries without significant focal stenosis. LISA: 0.53/48/22. Left: Evidence of patent endarterectomy with diffuse disease noted throughout the femoral-popliteal arteries without significant focal stenosis. L LISA: 0.57/10/43  -7/29/24 Xray R foot: Increasing volume of air within the soft tissues of the first toe in this patient with provided history of gangrene. The previously described cortical destruction along the ventral aspect of the first distal phalanx is not well visualized on the current examination, likely attributed to  differences in patient positioning.  -7/22/24 Xray R foot: Cortical destruction along the ventral aspect of the first distal phalanx worrisome for acute osteomyelitis. Large air within the overlying soft tissues.   -7/21/24 Echo: Left ventricular wall thickness is severely increased. The left ventricular ejection fraction is 41% by biplane measurement. Systolic function is mildly reduced. Global longitudinal strain is reduced at -10% with moderate to severe strain reduction in the ED mid to basal anteroseptal septal and inferior walls.. Unable to grade diastolic dysfunction given the severe degree of mitral annular calcification.   -7/18/24 CTA abd w/ runoff: Visualized descending thoracic and suprarenal abdominal aorta demonstrate marked soft plaque with multifocal regions of plaque ulceration. A point of relative stenosis within the infrarenal abdominal aorta measures up to 7 mm in diameter. Bilateral multifocal severe stenosis within the external iliac and common femoral arteries. Bilateral long segment SFA occlusion extending from the ostia to the level of the P1 popliteal artery. Three-vessel runoff on the right, the posterior tibial artery is dominant. Two-vessel runoff on the left, the peroneal artery is dominant. Segmental visualization of the posterior tibial artery. Interval increase in size of an exophytic lesion arising medially from the stomach, again consistent with gastrointestinal stromal tumor. New dilation of the pancreatic duct within the pancreatic head, no obvious pancreatic/ampulla lesion identified.  -7/16/24 LEAD: Right: Severe diffuse disease noted throughout the femoral-popliteal arteries with high grade stenosis vs occlusion of the proximal-distal SFA with reconstitution to the popliteal artery and high grade stenosis vs occlusion of the distal YOVANY. R LISA: 0.11/-/-. Left: Severe diffuse disease noted throughout the femoral-popliteal arteries with high grade stenosis vs occlusion of the  proximal-mid SFA with reconstitution in the distal SFA and high grade stenosis vs occlusion of the entire PTA. L LISA: 0.31/20/19.     Labs: 8/8  - WBC 22.92 (25.59)  - Hgb  8.3 (8.7)  - sCr 1.84/ eGFR 26  - K+ 3.3    Plan:   -Advanced calcific atherosclerotic AIOD/PAD w/ CTLI and R hallux dry gangrene (+) OM now s/p R hallux amputation w/ partial 1st ray resection by podiatry on 7/29  -S/P B/L CFAE, L AJCI/EIA balloon angio and shockwave lithotripsy w/ insertion of a L EIA drug-coated stent and L JACI VBX stent, L to R PTFE fem-fem bypass, and B/L groin vac placement on 7/24 (POD #14)  -B/L groin wound vacs intact w/ adequate suction; serous drainage, continue wound vac changes qMWF  -No plan for additional vascular intervention at this time. Plan to discharge to nursing facility with open toe amp and b/l groin VACs. Follow up with vascular in 2 weeks to determine next steps of right fem pop bypass   -ID following for ABX management, input appreciated; surgical cultures growing Morganella morganii  -Cardiology following   -Continue to monitor Hgb and transfuse < 7.0 per primary team. +melena, GI re-engaged. Endoscopy today, NPO  -Continue brilinta and Eliquis (per cards) and pravastatin for medical management  -GI sign off, LFTs down trending. No indication for further imaging or intervention. Re-engaged, see above  -Geriatrics input appreciated  -Continue medical management per primary team  -PT/OT follownig  - Seen and d/w Dr Alma Schuler

## 2024-08-08 NOTE — CASE MANAGEMENT
Case Management Discharge Planning Note    Patient name Anamaria Parnell  Location [place] [address] /South 2 M* MRN [id number]  : 1945 Date 2024       Current Admission Date: 2024  Current Admission Diagnosis:Aortoiliac occlusive disease (HCC)   Patient Active Problem List    Diagnosis Date Noted Date Diagnosed    Patient refused evaluation 2024     Melena 2024     Acute blood loss anemia 2024     Encephalopathy 2024     Elevated troponin 2024     Acute liver failure without hepatic coma 2024     Transaminitis 2024     Pulmonary hypertension (HCC) 2024     Chronic HFrEF, LVEF 30%, LVIDd 4.6 cm, AHA Stage C 2024     Paroxysmal atrial fibrillation (HCC) 2024     Coronary artery disease of native artery of native heart with stable angina pectoris (HCC) 2024     Cellulitis of toe of right foot 2024     Sepsis (HCC) 2024     Diabetic ulcer of toe of right foot associated with diabetes mellitus due to underlying condition, limited to breakdown of skin (HCC) 2024     Carotid stenosis, asymptomatic, right 10/27/2023     Complex renal cyst 10/18/2023     Encounter for follow-up surveillance of urothelial carcinoma of lower urinary tract 10/18/2023     Encounter to discuss test results 10/17/2023     Smoking 2023     Renal cyst 2023     Primary hypertension 2023     Dysarthria 08/15/2023     Stage 3b chronic kidney disease (HCC)      Aortoiliac occlusive disease (HCC) 10/11/2022     History of gastrointestinal stromal tumor (GIST) 2022     Secondary hyperparathyroidism of renal origin (HCC) 2022     Gastrointestinal stromal tumor (GIST) (HCC) 2022     Type 2 diabetes mellitus, without long-term current use of insulin (HCC) 2021     Type 2 diabetes mellitus with diabetic peripheral angiopathy without gangrene (HCC) 2021     Depression, recurrent (HCC) 2021     Acute renal  failure (ARF) ([facility]) 11/03/2020     Hypertensive kidney disease with stage 4 chronic kidney disease ([facility]) 11/03/2020     Cervical radiculopathy 05/12/2020     Malignant neoplasm of overlapping sites of bladder ([facility]) 04/24/2020     Tachy-jeff syndrome ([facility]) 04/15/2020     Stenosis of left anterior descending artery Mid LAD 50-60% stenosis 04/01/2020     Right coronary artery occlusion ([facility])total occlusion of proximal RCA with collateral circ 04/01/2020     Pulmonary nodule 7 mm groundglass opacity in the right upper lobe, unchanged since October 2019 03/19/2020     Atherosclerosis of native arteries of right leg with ulceration of other part of foot ([facility]) 03/03/2020     Symptomatic carotid artery stenosis, left 03/03/2020     Femoral artery stenosis, right ([facility]) 50-75% stenosis in the common femoral artery. 01/14/2020     Mesenteric artery stenosis ([facility]) 01/14/2020     Positive cardiac stress test  small, mildly severe, partially reversible myocardial perfusion defect of anterior and inferior wall  11/12/2019     Abnormal CT of the chest suspicious for an infectious or inflammatory bronchiolitis. 11/07/2019     Celiac artery stenosis ([facility]) >70% stenosis in the celiac trunk 11/05/2019     Aortoiliac stenosis, left ([facility]) 02/25/2019     Spondylosis of lumbar region without myelopathy or radiculopathy 01/29/2019     Lumbosacral spondylosis without myelopathy 01/29/2019     Statin intolerance 01/22/2019     Lumbar disc herniation 01/22/2019     Herniated lumbar intervertebral disc 01/22/2019     Chronic GERD 12/04/2017     Acute renal failure superimposed on stage 3 chronic kidney disease ([facility]) 12/04/2017     Claudication in peripheral vascular disease ([facility]) 12/04/2017     Gout 12/04/2017     Hx-TIA (transient ischemic attack) 12/04/2017     Hyperlipidemia associated with type 2 diabetes mellitus  ([facility]) 12/04/2017     Hypertension associated with diabetes  ([facility]) 12/04/2017     Osteoarthritis 12/04/2017     Right renal  artery stenosis (HCC) 12/04/2017     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     Hyperlipidemia, unspecified 12/04/2017     Vitamin D deficiency 09/08/2016     Basilar artery stenosis 06/22/2016     Type 2 diabetes mellitus with diabetic nephropathy (HCC) 12/19/2015     Hypercholesteremia 12/19/2015     Hypertension 12/19/2015     H/o CVA 11/09/2015       LOS (days): 20  Geometric Mean LOS (GMLOS) (days): 6.5  Days to GMLOS:-13.2     OBJECTIVE:  Risk of Unplanned Readmission Score: 42.53         Current admission status: Inpatient   Preferred Pharmacy: No Pharmacies Listed  Primary Care Provider: Ritchie Lawton MD    [facility]  Secondary Insurance:     DISCHARGE DETAILS:                                                                                                               Facility Insurance Auth Number: [id number]

## 2024-08-08 NOTE — ASSESSMENT & PLAN NOTE
Shock liver related to anemia/hypotension.  LFTs normalized    Results from last 7 days   Lab Units 08/08/24  0516 08/07/24  0543 08/04/24  0434 08/02/24  0520   AST U/L 19 24 52* 362*   ALT U/L 25 30 103* 246*   TOTAL BILIRUBIN mg/dL 1.19* 1.11* 0.96 1.19*

## 2024-08-08 NOTE — PROGRESS NOTES
[facility]  Progress Note  Name: Anamaria Parnell I  MRN: [id number]  Unit/Bed#: [place] [address] -01 I Date of Admission: 7/19/2024   Date of Service: 8/8/2024 I Hospital Day: 20    Assessment & Plan   * Aortoiliac occlusive disease (HCC)  Assessment & Plan  80 yo female ex-smoker w/ a hx of obesity, CKD IV, DM II w/ peripheral neuropathy, HLD, HTN, CAD, L MCA/PCA CVA S/P L TCAR 9/7/23 ([facility]), FÉLIX, mesenteric artery stenosis and aortoiliac occlusive disease w/ PAD and chronic R hallux dry gangrene presented to [facility] on 7/16/24 w/ worsening R great toe pain and R hallux gangrene. Pt was transferred to [facility] on 7/19 w/ plans for R femoral endarterectomy w/ retrograde stenting and RLE bypass.     - s/p B/L CFAE, L JACI/EIA balloon angio and shockwave lithotripsy w/ insertion of a L EIA drug-coated stent and L JACI VBX stent, L to R PTFE fem-fem bypass, and B/L groin vac placement on 7/24.   - s/p right hallux amputation 7/29/24  - s/p 3U PRBC and 1U FFP intra-op and 1U PRBC on 7/30 and 7/31.    Plan was to return to the OR for right femoral to BK-pop bypass.  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       Imaging:  -7/30/24 CT head: Chronic left PCA territory infarct. No evidence of acute infarct, intracranial hemorrhage or mass.  -7/29/24 LEAD: Right: Evidence of patent endarterectomy with diffuse disease noted throughout the femoral-popliteal arteries without significant focal stenosis. LISA: 0.53/48/22. Left: Evidence of patent endarterectomy with diffuse disease noted throughout the femoral-popliteal arteries without significant focal stenosis. L LISA: 0.57/10/43  -7/29/24 Xray R foot:  Increasing volume of air within the soft tissues of the first toe in this patient with provided history of gangrene. The previously described cortical destruction along the ventral aspect of the first distal phalanx is not well visualized on the current examination, likely attributed to differences in patient positioning.  -7/22/24 Xray R foot: Cortical destruction along the ventral aspect of the first distal phalanx worrisome for acute osteomyelitis. Large air within the overlying soft tissues.   -7/21/24 Echo: Left ventricular wall thickness is severely increased. The left ventricular ejection fraction is 41% by biplane measurement. Systolic function is mildly reduced. Global longitudinal strain is reduced at -10% with moderate to severe strain reduction in the ED mid to basal anteroseptal septal and inferior walls.. Unable to grade diastolic dysfunction given the severe degree of mitral annular calcification.   -7/18/24 CTA abd w/ runoff: Visualized descending thoracic and suprarenal abdominal aorta demonstrate marked soft plaque with multifocal regions of plaque ulceration. A point of relative stenosis within the infrarenal abdominal aorta measures up to 7 mm in diameter. Bilateral multifocal severe stenosis within the external iliac and common femoral arteries. Bilateral long segment SFA occlusion extending from the ostia to the level of the P1 popliteal artery. Three-vessel runoff on the right, the posterior tibial artery is dominant. Two-vessel runoff on the left, the peroneal artery is dominant. Segmental visualization of the posterior tibial artery. Interval increase in size of an exophytic lesion arising medially from the stomach, again consistent with gastrointestinal stromal tumor. New dilation of the pancreatic duct within the pancreatic head, no obvious pancreatic/ampulla lesion identified.  -7/16/24 LEAD: Right: Severe diffuse disease noted throughout the femoral-popliteal arteries with high grade  "stenosis vs occlusion of the proximal-distal SFA with reconstitution to the popliteal artery and high grade stenosis vs occlusion of the distal YOVANY. R LISA: 0.11/-/-. Left: Severe diffuse disease noted throughout the femoral-popliteal arteries with high grade stenosis vs occlusion of the proximal-mid SFA with reconstitution in the distal SFA and high grade stenosis vs occlusion of the entire PTA. L LISA: 0.31/20/19.     Labs: 8/8  - WBC 22.92 (25.59)  - Hgb  8.3 (8.7)  - sCr 1.84/ eGFR 26  - K+ 3.3    Plan:   -Advanced calcific atherosclerotic AIOD/PAD w/ CTLI and R hallux dry gangrene (+) OM now s/p R hallux amputation w/ partial 1st ray resection by podiatry on 7/29  -S/P B/L CFAE, L JACI/EIA balloon angio and shockwave lithotripsy w/ insertion of a L EIA drug-coated stent and L JACI VBX stent, L to R PTFE fem-fem bypass, and B/L groin vac placement on 7/24 (POD #14)  -B/L groin wound vacs intact w/ adequate suction; serous drainage, continue wound vac changes qMWF  -No plan for additional vascular intervention at this time. Plan to discharge to nursing facility with open toe amp and b/l groin VACs. Follow up with vascular in 2 weeks to determine next steps of right fem pop bypass   -ID following for ABX management, input appreciated; surgical cultures growing Morganella morganii  -Cardiology following   -Continue to monitor Hgb and transfuse < 7.0 per primary team. +melena, GI re-engaged. Endoscopy today, NPO  -Continue brilinta and Eliquis (per cards) and pravastatin for medical management  -GI sign off, LFTs down trending. No indication for further imaging or intervention. Re-engaged, see above  -Geriatrics input appreciated  -Continue medical management per primary team  -PT/OT follownig  - Seen and d/w Dr Alma Schuler           Subjective:  Resting in bed, NAD. NPO for endoscopy today  VAC to b/l groin CDI with adequate suction    Vitals:  /63   Pulse 94   Temp 97.8 °F (36.6 °C)   Resp 18   Ht 4' 10\" " "(1.473 m)   Wt 66.7 kg (147 lb 0.8 oz)   LMP  (LMP Unknown)   SpO2 97%   BMI 30.73 kg/m²     I/Os:  I/O last 3 completed shifts:  In: 240 [P.O.:240]  Out: 1120 [Urine:580; Drains:540]  No intake/output data recorded.    Lab Results and Cultures:   Lab Results   Component Value Date    WBC 22.92 (H) 08/08/2024    HGB 8.3 (L) 08/08/2024    HCT 27.3 (L) 08/08/2024     (H) 08/08/2024     08/08/2024     Lab Results   Component Value Date    GLUCOSE 179 (H) 07/24/2024    CALCIUM 8.5 08/08/2024    K 3.3 (L) 08/08/2024    CO2 31 08/08/2024     08/08/2024    BUN 76 (H) 08/08/2024    CREATININE 1.84 (H) 08/08/2024     Lab Results   Component Value Date    INR 1.70 (H) 07/31/2024    INR 2.10 (H) 07/30/2024    INR 1.51 (H) 07/24/2024    PROTIME 20.1 (H) 07/31/2024    PROTIME 23.7 (H) 07/30/2024    PROTIME 18.4 (H) 07/24/2024        Blood Culture:   Lab Results   Component Value Date    BLOODCX No Growth After 5 Days. 07/17/2024   ,   Urinalysis:   Lab Results   Component Value Date    COLORU Light Yellow 07/17/2024    CLARITYU Clear 07/17/2024    SPECGRAV 1.016 07/17/2024    PHUR 5.5 07/17/2024    LEUKOCYTESUR Moderate (A) 07/17/2024    NITRITE Negative 07/17/2024    GLUCOSEU Negative 07/17/2024    KETONESU Negative 07/17/2024    BILIRUBINUR Negative 07/17/2024    BLOODU Negative 07/17/2024   ,   Urine Culture:   Lab Results   Component Value Date    URINECX (A) 07/17/2024     >100,000 cfu/ml - Lactobacillus gasseri Lactobacillus species    URINECX <10,000 cfu/ml Micrococcus luteus (A) 07/17/2024    URINECX (A) 07/17/2024     <10,000 cfu/ml - Lactobacillus rhamnosus Lactobacillus species   ,   Wound Culure: No results found for: \"WOUNDCULT\"    Medications:  Current Facility-Administered Medications   Medication Dose Route Frequency    allopurinol (ZYLOPRIM) tablet 100 mg  100 mg Oral Daily    apixaban (ELIQUIS) tablet 5 mg  5 mg Oral BID    escitalopram (LEXAPRO) tablet 20 mg  20 mg Oral Daily    " HYDROmorphone HCl (DILAUDID) injection 0.2 mg  0.2 mg Intravenous Q6H PRN    insulin detemir (LEVEMIR) subcutaneous injection 10 Units  10 Units Subcutaneous Daily    insulin lispro (HumALOG/ADMELOG) 100 units/mL subcutaneous injection 1-5 Units  1-5 Units Subcutaneous TID AC    insulin lispro (HumALOG/ADMELOG) 100 units/mL subcutaneous injection 5 Units  5 Units Subcutaneous TID With Meals    lidocaine (LIDODERM) 5 % patch 1 patch  1 patch Topical Daily    metoprolol succinate (TOPROL-XL) 24 hr tablet 25 mg  25 mg Oral Daily    ondansetron (ZOFRAN) injection 4 mg  4 mg Intravenous Q6H PRN    oxyCODONE (ROXICODONE) IR tablet 5 mg  5 mg Oral Q6H PRN    pantoprazole (PROTONIX) injection 40 mg  40 mg Intravenous Q12H JAVIER    sodium bicarbonate tablet 650 mg  650 mg Oral BID after meals    ticagrelor (BRILINTA) tablet 90 mg  90 mg Oral Q12H JAVIER    torsemide (DEMADEX) tablet 10 mg  10 mg Oral Daily       Imaging:  As above    Physical Exam:    General appearance:  alert and oriented to person and plan  Lungs:  even unlabored, O2 vioa NC  Heart: regular rate and rhythm, S1, S2 normal, no murmur, click, rub or gallop  Abdomen: soft, non-tender; bowel sounds normal; no masses,  no organomegaly and ecchymotic  Extremities:  perfused, M/s intact, R foot podiatric dsg in place. B/l prevlalon boots     Wound/Incision:  B/l groin surgical wounds with VAC intact. Continuous suction 125mmHg, serous drainage.   R foot partial 1st ray am, dressing intact    Pulse exam:  Radial: Right: 2+ Left:: 2+  DP: Right:  Dressing  Left: doppler signal        GRACIELA Vogt  8/8/2024  [facility], [phone]

## 2024-08-08 NOTE — PLAN OF CARE
Problem: OCCUPATIONAL THERAPY ADULT  Goal: Performs self-care activities at highest level of function for planned discharge setting.  See evaluation for individualized goals.  Outcome: Progressing  Note: Limitation: Decreased ADL status, Decreased UE strength, Decreased Safe judgement during ADL, Decreased endurance, Decreased self-care trans, Decreased high-level ADLs  Prognosis: Fair  Assessment: Pt seen for skilled OT tx this date. Tx focused on improving strength, activity tolerance and balance, safety awareness, cognition to increase independence with self care tasks. Pt tolerated session fair. Pt was limited by cognition,weakness and pain. Greeted in supine and agreeable. Bed level ADL completed. Pt performed grooming Thania, UB dressing/ bathing with modA, LB dressing / bathing total assist , Toileting total assist bed level,  bed mobility rolling minAx1, transfers supine> sit maxAx2, SB transfer EOB> chair dep x 2. Pt demonstrated poor  sitting balance during functional tasks, no LOB noted.Pt required maximal verbal cuing during session to safely complete tasks. Current OT DC recommendations for pt is level 2 resources.     Rehab Resource Intensity Level, OT: II (Moderate Resource Intensity)

## 2024-08-08 NOTE — PROGRESS NOTES
Progress Note- Anamaria Parnell 79 y.o. female MRN: [id number]    Unit/Bed#: [place] [address] -01 Encounter: [id number]      Assessment and Plan:    Ms. Anamaria Parnell is a 79-year-old female with a PMHx of PAD, basilar artery stenosis, CVA, hypertension, CAD, A-fib, CHF, CKD, and diabetes who initially presented with right toe pain.  Ultimately found to have right-sided lower extremity gangrene in setting of PAD.  Patient now status post vascular intervention with femoral endarterectomy with stent placement (7/24) along with right hallux amputation with podiatry (7/29).  Hospital course previously complicated by ischemic hepatopathy with acute elevations in liver chemistry for which GI was initially consulted.  LFTs had normalized and GI signed off.  However, GI asked to reevaluate patient given concern for ongoing anemia and melena.      Melena   Acute on Chronic Anemia   Abdominal Pain   Chronic AP/AC Use     Over the weekend, patient noted to have development of black/tarry stools.  Hemoglobin also noted to have acutely declined.  Hemoglobin has been stable around 10 but declined to 8.7 today.  Melena has also been accompanied by epigastric abdominal pain that has been persistent.  Given ongoing complaints and persistent melena, initial plan was to evaluate with EGD especially considering use of AP/AC.  However, patient very high risk for anesthesia given multiple comorbid conditions including her severely reduced EF and widespread vascular disease.  Today, hemoglobin fortunately has stabilized and is 8.3.  Vital signs also continue to remain stable.  Given high risk of anesthesia, we will hold off on EGD or any procedures unless patient with change in clinical stability or with ongoing hemoglobin drop requiring blood transfusion.  This was discussed with both patient's son as well as primary team who are also in agreement.  In the meantime, we will plan to continue on with medical therapy with PPI for empiric  treatment of ulcer disease.     Plan:  No plan for endoscopic intervention at this time unless change in clinical stability  Okay to continue on Protonix 40 mg IV BID  Okay to continue Eliquis/Brilinta given recent vascular intervention; avoid use of all NSAIDs  Trend H&H; transfuse for goal of >7.0   Monitor hemodynamics; avoid episodes of hypotension   Maintain adequate IV access with 2 large bore Ivs  Monitor bowel movements; alert GI team of signs of overt GIB  Additional pain and symptom management per primary team     ______________________________________________________________________    Subjective:     Patient seen and evaluated bedside.  Sitting in bedside chair no acute distress or visible discomfort.  Remains lethargic.  Denies abdominal pain.  Per nursing, patient with continued black stools.    Medication Administration - last 24 hours from 08/07/2024 1650 to 08/08/2024 1650         Date/Time Order Dose Route Action Action by     08/08/2024 0814 EDT allopurinol (ZYLOPRIM) tablet 100 mg 100 mg Oral Given Jose Townsend RN     08/08/2024 0814 EDT escitalopram (LEXAPRO) tablet 20 mg 20 mg Oral Given Jose Townsend RN     08/08/2024 0812 EDT lidocaine (LIDODERM) 5 % patch 1 patch 1 patch Topical Medication Applied Jose Townsend RN     08/07/2024 2225 EDT lidocaine (LIDODERM) 5 % patch 1 patch 1 patch Topical Patch Removed Liya Lamb     08/08/2024 0814 EDT ticagrelor (BRILINTA) tablet 90 mg 90 mg Oral Given Jose Townsend RN     08/07/2024 2216 EDT ticagrelor (BRILINTA) tablet 90 mg 90 mg Oral Given Liya Lamb     08/08/2024 0847 EDT oxyCODONE (ROXICODONE) IR tablet 5 mg 5 mg Oral Given Jose Townsend RN     08/08/2024 1626 EDT insulin lispro (HumALOG/ADMELOG) 100 units/mL subcutaneous injection 1-5 Units 1 Units Subcutaneous Given Jose Townsend RN     08/08/2024 1231 EDT insulin lispro (HumALOG/ADMELOG) 100 units/mL subcutaneous injection 1-5 Units -- Subcutaneous Not Given Jose  Cary, RN     08/08/2024 0808 EDT insulin lispro (HumALOG/ADMELOG) 100 units/mL subcutaneous injection 1-5 Units -- Subcutaneous Not Given Jose Townsend, RN     08/07/2024 1700 EDT insulin lispro (HumALOG/ADMELOG) 100 units/mL subcutaneous injection 1-5 Units 1 Units Subcutaneous Not Given Stefania Cheung, RN     08/08/2024 1626 EDT sodium bicarbonate tablet 650 mg 650 mg Oral Given Jose Townsend, RN     08/08/2024 0814 EDT sodium bicarbonate tablet 650 mg 650 mg Oral Given Jose Townsend, RN     08/07/2024 1747 EDT sodium bicarbonate tablet 650 mg 650 mg Oral Given Stefania Cheung, RN     08/08/2024 0814 EDT torsemide (DEMADEX) tablet 10 mg 10 mg Oral Given Jose Townsend, RN     08/08/2024 0813 EDT insulin detemir (LEVEMIR) subcutaneous injection 10 Units 10 Units Subcutaneous Given Jose Townsend, RN     08/08/2024 0814 EDT metoprolol succinate (TOPROL-XL) 24 hr tablet 25 mg 25 mg Oral Given Jose Townsend, RN     08/08/2024 1625 EDT insulin lispro (HumALOG/ADMELOG) 100 units/mL subcutaneous injection 5 Units 5 Units Subcutaneous Not Given Jose Townsend, RN     08/08/2024 1232 EDT insulin lispro (HumALOG/ADMELOG) 100 units/mL subcutaneous injection 5 Units 5 Units Subcutaneous Not Given Jose Townsend, RN     08/08/2024 0809 EDT insulin lispro (HumALOG/ADMELOG) 100 units/mL subcutaneous injection 5 Units 5 Units Subcutaneous Not Given Jose Townsend, RN     08/07/2024 1747 EDT insulin lispro (HumALOG/ADMELOG) 100 units/mL subcutaneous injection 5 Units 5 Units Subcutaneous Not Given Stefania Cheung, RN     08/08/2024 1626 EDT apixaban (ELIQUIS) tablet 5 mg 5 mg Oral Given Jose Townsend, RN     08/08/2024 0814 EDT apixaban (ELIQUIS) tablet 5 mg 5 mg Oral Given Jose Townsend RN     08/07/2024 1748 EDT apixaban (ELIQUIS) tablet 5 mg 5 mg Oral Given Stefania Cheung RN     08/08/2024 0861 EDT pantoprazole (PROTONIX) injection 40 mg 40 mg Intravenous Given Jose Townsend RN     08/07/2024 9777  "EDT pantoprazole (PROTONIX) injection 40 mg 40 mg Intravenous Given Liya Lamb            Objective:     Vitals: Blood pressure 125/66, pulse 80, temperature (!) 97.4 °F (36.3 °C), resp. rate 18, height 4' 10\" (1.473 m), weight 66.7 kg (147 lb 0.8 oz), SpO2 100%.,Body mass index is 30.73 kg/m².      Intake/Output Summary (Last 24 hours) at 8/8/2024 1650  Last data filed at 8/8/2024 1000  Gross per 24 hour   Intake 160 ml   Output 1019 ml   Net -859 ml       Physical Exam  Constitutional:       General: She is not in acute distress.     Appearance: She is normal weight. She is ill-appearing. She is not toxic-appearing.      Comments: Lethargic, falling asleep during examination   HENT:      Head: Normocephalic and atraumatic.      Nose: Nose normal.   Cardiovascular:      Rate and Rhythm: Normal rate.      Pulses: Normal pulses.   Pulmonary:      Effort: Pulmonary effort is normal.   Abdominal:      General: Abdomen is flat. Bowel sounds are normal. There is no distension.      Tenderness: There is no abdominal tenderness.      Hernia: No hernia is present.   Skin:     Coloration: Skin is not jaundiced or pale.   Neurological:      Mental Status: She is disoriented.           Invasive Devices       Peripheral Intravenous Line  Duration             Peripheral IV 07/31/24 Right Antecubital 8 days              Drain  Duration             External Urinary Catheter <1 day                    Lab Results:  No results displayed because visit has over 200 results.          Imaging Studies: I have personally reviewed pertinent imaging studies.      "

## 2024-08-08 NOTE — RESTORATIVE TECHNICIAN NOTE
Restorative Technician Note      Patient Name: Anamaria Parnell     Restorative Tech Visit Date: 08/08/24  Note Type: Mobility  Patient Position Upon Consult: Bedside chair  Activity Performed: Range of motion; Transferred; Repositioned  Assistive Device: Other (Comment) (Sheet, HHA)  Patient Position at End of Consult: All needs within reach; Supine

## 2024-08-08 NOTE — PLAN OF CARE
Problem: PHYSICAL THERAPY ADULT  Goal: Performs mobility at highest level of function for planned discharge setting.  See evaluation for individualized goals.  Description: Treatment/Interventions: Functional transfer training, LE strengthening/ROM, Therapeutic exercise, Patient/family training, Equipment eval/education, Bed mobility, Cognitive reorientation, Gait training, Compensatory technique education, Continued evaluation, Spoke to nursing, OT          See flowsheet documentation for full assessment, interventions and recommendations.  Outcome: Not Progressing  Note: Prognosis: Guarded  Problem List: Decreased strength, Decreased endurance, Impaired balance, Decreased mobility, Decreased cognition, Impaired judgement, Decreased safety awareness, Decreased skin integrity (WBS)  Assessment: Pt seen for PT treatment session this date with interventions consisting of bed mobility, transfer training, and HEP, and education provided as needed for safety and direction to improve functional mobility, safety awareness, and activity tolerance. Pt agreeable to PT treatment session upon arrival, pt found supine in bed . At end of session, pt left  seated out of bed in chair with all needs in reach. In comparison to previous session, pt with no improvement activity tolerance, endurance, standing balance, b/l le strength, AM- pac score, and functional mobility. Pt  is requiring increased assistance for all aspects of mobility,  due to decreased cognition, lethargy, decreased overall strength, mobility,  balance, endurance and activity tolerance and limitations in WBS. Pt  requires verbal and tactile cues to maintain at task. Poor effort noted with quick fatigue with activities, mobility and le there ex.   Pt required frequent verbal cues to open eyes and maintain level of alertness t/o PT session. Pt is functioning at max assist x2 for supine to sit and dependant assist x2 for sb transfer from bed to chair. Increased  assistance, verbal and tactile cues to perform and complete al b/l le exercises.    Continue to recommend  level II moderate rehab resource intensity  at time of d/c in order to maximize pt's functional independence and safety w/ mobility. Pt continues to be functioning below baseline level. PT will continue to see pt while here in order to address the deficits listed above and provide interventions consistent w/ POC in effort to achieve STGs.    The patient's AM-PAC Basic Mobility Inpatient Short Form Raw Score is 6. A raw score less than 16 suggests the patient may benefit from discharge to post-acute rehabilitation services. Please also refer to the recommendation of the Physical Therapist for safe discharge planning.  Barriers to Discharge: Decreased caregiver support, Inaccessible home environment  Barriers to Discharge Comments: son reports pt has to be indep to return home as he cannot provide complete care for her  Rehab Resource Intensity Level, PT: II (Moderate Resource Intensity)    See flowsheet documentation for full assessment.

## 2024-08-08 NOTE — NURSING NOTE
Patients IV is due to be changed . Unable to insert IV due to poor access. Ultrasound was used by ICU but was unable to access as well. Provider made aware.

## 2024-08-08 NOTE — CASE MANAGEMENT
[facility] has received APPROVED authorization.  Insurance:   [employer]  Called in by Rep:soren EDGAR#  [phone]  Authorization received for: SNF  Facility: [facility]   Authorization #:[id number]   Start of Care:8/7  Next Review Date:8/19  Continued Stay Care Coordinator:keila EDGAR#: [phone]  Submit next review to: [phone]    Care Manager notified:erik erickson email     Please reach out to  for updates on any clinical information.

## 2024-08-08 NOTE — ASSESSMENT & PLAN NOTE
History of PAD CAD diabetes and hypertension presented to [facility] for right great toe gangrene transferred here for vascular bypass.  This admission: Status post bilateral femoral endarterectomy with bypass and JACI/EIA stenting  VAC still present.  Continue aspirin and Brilinta.  Gangrene of toe/cellulitis.  Status post first ray amputation.  Completed course of ceftriaxone per ID.  No further vascular/podiatry interventions anticipated.  Disposition: Possibly tomorrow if no GI intervention is anticipated

## 2024-08-08 NOTE — PLAN OF CARE
Problem: PAIN - ADULT  Goal: Verbalizes/displays adequate comfort level or baseline comfort level  Description: Interventions:  - Encourage patient to monitor pain and request assistance  - Assess pain using appropriate pain scale  - Administer analgesics based on type and severity of pain and evaluate response  - Implement non-pharmacological measures as appropriate and evaluate response  - Consider cultural and social influences on pain and pain management  - Notify physician/advanced practitioner if interventions unsuccessful or patient reports new pain  Outcome: Progressing     Problem: INFECTION - ADULT  Goal: Absence or prevention of progression during hospitalization  Description: INTERVENTIONS:  - Assess and monitor for signs and symptoms of infection  - Monitor lab/diagnostic results  - Monitor all insertion sites, i.e. indwelling lines, tubes, and drains  - Monitor endotracheal if appropriate and nasal secretions for changes in amount and color  - [place] appropriate cooling/warming therapies per order  - Administer medications as ordered  - Instruct and encourage patient and family to use good hand hygiene technique  - Identify and instruct in appropriate isolation precautions for identified infection/condition  Outcome: Progressing     Problem: SAFETY ADULT  Goal: Patient will remain free of falls  Description: INTERVENTIONS:  - Educate patient/family on patient safety including physical limitations  - Instruct patient to call for assistance with activity   - Consult OT/PT to assist with strengthening/mobility   - Keep Call bell within reach  - Keep bed low and locked with side rails adjusted as appropriate  - Keep care items and personal belongings within reach  - Initiate and maintain comfort rounds  - Make Fall Risk Sign visible to staff  - Offer Toileting every 2 Hours, in advance of need  - Initiate/Maintain bed alarm  - Apply yellow socks and bracelet for high fall risk patients  - Consider  moving patient to room near nurses station  Outcome: Progressing  Goal: Maintain or return to baseline ADL function  Description: INTERVENTIONS:  -  Assess patient's ability to carry out ADLs; assess patient's baseline for ADL function and identify physical deficits which impact ability to perform ADLs (bathing, care of mouth/teeth, toileting, grooming, dressing, etc.)  - Assess/evaluate cause of self-care deficits   - Assess range of motion  - Assess patient's mobility; develop plan if impaired  - Assess patient's need for assistive devices and provide as appropriate  - Encourage maximum independence but intervene and supervise when necessary  - Involve family in performance of ADLs  - Assess for home care needs following discharge   - Consider OT consult to assist with ADL evaluation and planning for discharge  - Provide patient education as appropriate  Outcome: Progressing  Goal: Maintains/Returns to pre admission functional level  Description: INTERVENTIONS:  - Perform AM-PAC 6 Click Basic Mobility/ Daily Activity assessment daily.  - Set and communicate daily mobility goal to care team and patient/family/caregiver.   - Collaborate with rehabilitation services on mobility goals if consulted  - Stand patient 3 times a day  - Ambulate patient 3 times a day  - Out of bed to chair 3 times a day   - Out of bed for meals 3 times a day  - Out of bed for toileting  - Record patient progress and toleration of activity level   Outcome: Progressing     Problem: DISCHARGE PLANNING  Goal: Discharge to home or other facility with appropriate resources  Description: INTERVENTIONS:  - Identify barriers to discharge w/patient and caregiver  - Arrange for needed discharge resources and transportation as appropriate  - Identify discharge learning needs (meds, wound care, etc.)  - Arrange for interpretive services to assist at discharge as needed  - Refer to Case Management Department for coordinating discharge planning if the  patient needs post-hospital services based on physician/advanced practitioner order or complex needs related to functional status, cognitive ability, or social support system  Outcome: Progressing     Problem: Knowledge Deficit  Goal: Patient/family/caregiver demonstrates understanding of disease process, treatment plan, medications, and discharge instructions  Description: Complete learning assessment and assess knowledge base.  Interventions:  - Provide teaching at level of understanding  - Provide teaching via preferred learning methods  Outcome: Progressing     Problem: CARDIOVASCULAR - ADULT  Goal: Maintains optimal cardiac output and hemodynamic stability  Description: INTERVENTIONS:  - Monitor I/O, vital signs and rhythm  - Monitor for S/S and trends of decreased cardiac output  - Administer and titrate ordered vasoactive medications to optimize hemodynamic stability  - Assess quality of pulses, skin color and temperature  - Assess for signs of decreased coronary artery perfusion  - Instruct patient to report change in severity of symptoms  Outcome: Progressing     Problem: METABOLIC, FLUID AND ELECTROLYTES - ADULT  Goal: Electrolytes maintained within normal limits  Description: INTERVENTIONS:  - Monitor labs and assess patient for signs and symptoms of electrolyte imbalances  - Administer electrolyte replacement as ordered  - Monitor response to electrolyte replacements, including repeat lab results as appropriate  - Instruct patient on fluid and nutrition as appropriate  Outcome: Progressing  Goal: Fluid balance maintained  Description: INTERVENTIONS:  - Monitor labs   - Monitor I/O and WT  - Instruct patient on fluid and nutrition as appropriate  - Assess for signs & symptoms of volume excess or deficit  Outcome: Progressing  Goal: Glucose maintained within target range  Description: INTERVENTIONS:  - Monitor Blood Glucose as ordered  - Assess for signs and symptoms of hyperglycemia and hypoglycemia  -  Administer ordered medications to maintain glucose within target range  - Assess nutritional intake and initiate nutrition service referral as needed  Outcome: Progressing     Problem: HEMATOLOGIC - ADULT  Goal: Maintains hematologic stability  Description: INTERVENTIONS  - Assess for signs and symptoms of bleeding or hemorrhage  - Monitor labs  - Administer supportive blood products/factors as ordered and appropriate  Outcome: Progressing     Problem: MUSCULOSKELETAL - ADULT  Goal: Maintain or return mobility to safest level of function  Description: INTERVENTIONS:  - Assess patient's ability to carry out ADLs; assess patient's baseline for ADL function and identify physical deficits which impact ability to perform ADLs (bathing, care of mouth/teeth, toileting, grooming, dressing, etc.)  - Assess/evaluate cause of self-care deficits   - Assess range of motion  - Assess patient's mobility  - Assess patient's need for assistive devices and provide as appropriate  - Encourage maximum independence but intervene and supervise when necessary  - Involve family in performance of ADLs  - Assess for home care needs following discharge   - Consider OT consult to assist with ADL evaluation and planning for discharge  - Provide patient education as appropriate  Outcome: Progressing  Goal: Maintain proper alignment of affected body part  Description: INTERVENTIONS:  - Support, maintain and protect limb and body alignment  - Provide patient/ family with appropriate education  Outcome: Progressing     Problem: Prexisting or High Potential for Compromised Skin Integrity  Goal: Skin integrity is maintained or improved  Description: INTERVENTIONS:  - Identify patients at risk for skin breakdown  - Assess and monitor skin integrity  - Assess and monitor nutrition and hydration status  - Monitor labs   - Assess for incontinence   - Turn and reposition patient  - Assist with mobility/ambulation  - Relieve pressure over bony  prominences  - Avoid friction and shearing  - Provide appropriate hygiene as needed including keeping skin clean and dry  - Evaluate need for skin moisturizer/barrier cream  - Collaborate with interdisciplinary team   - Patient/family teaching  - Consider wound care consult   Outcome: Progressing     Problem: Nutrition/Hydration-ADULT  Goal: Nutrient/Hydration intake appropriate for improving, restoring or maintaining nutritional needs  Description: Monitor and assess patient's nutrition/hydration status for malnutrition. Collaborate with interdisciplinary team and initiate plan and interventions as ordered.  Monitor patient's weight and dietary intake as ordered or per policy. Utilize nutrition screening tool and intervene as necessary. Determine patient's food preferences and provide high-protein, high-caloric foods as appropriate.     INTERVENTIONS:  - Monitor oral intake, urinary output, labs, and treatment plans  - Assess nutrition and hydration status and recommend course of action  - Evaluate amount of meals eaten  - Assist patient with eating if necessary   - Allow adequate time for meals  - Recommend/ encourage appropriate diets, oral nutritional supplements, and vitamin/mineral supplements  - Order, calculate, and assess calorie counts as needed  - Recommend, monitor, and adjust tube feedings and TPN/PPN based on assessed needs  - Assess need for intravenous fluids  - Provide specific nutrition/hydration education as appropriate  - Include patient/family/caregiver in decisions related to nutrition  Outcome: Progressing     Problem: NEUROSENSORY - ADULT  Goal: Achieves maximal functionality and self care  Description: INTERVENTIONS  - Monitor swallowing and airway patency with patient fatigue and changes in neurological status  - Encourage and assist patient to increase activity and self care.   - Encourage visually impaired, hearing impaired and aphasic patients to use assistive/communication  devices  Outcome: Progressing

## 2024-08-08 NOTE — ASSESSMENT & PLAN NOTE
Acute blood loss anemia from procedures.  Has received 5 units of PRBC since admission.  Downtrending hemoglobin with melanotic stools.  Discussed with GI.  Remains high risk for endoscopy.  Continue pantoprazole twice daily.  To decide on endoscopy tomorrow vs empiric treatment    Results from last 7 days   Lab Units 08/08/24  0516 08/07/24  0543 08/04/24  0434 08/03/24  2209   HEMOGLOBIN g/dL 8.3* 8.7* 10.1* 10.0*      Patient resting in bed, sleeping, no signs of pain or distress, unlabored breathing noted, in hallway bed, bed in low position, call light within reach, frequent rounding performed, will continue to assess patient.

## 2024-08-08 NOTE — PHYSICAL THERAPY NOTE
PHYSICAL THERAPY NOTE          Patient Name: Anamaria Parnell  Today's Date: 8/8/2024 08/08/24 0857   Note Type   Note Type Treatment   Pain Assessment   Pain Assessment Tool 0-10   Restrictions/Precautions   RLE Weight Bearing Per Order NWB   LLE Weight Bearing Per Order WBAT   Other Precautions Cognitive;Chair Alarm;Bed Alarm;WBS;Multiple lines;Fall Risk;Pain  (wound vac)   Cognition   Overall Cognitive Status Impaired   Arousal/Participation Lethargic;Persistent stimuli required   Attention Attends with cues to redirect   Orientation Level Oriented to person;Disoriented to place;Disoriented to time;Disoriented to situation   Memory Decreased recall of precautions;Decreased recall of recent events;Decreased short term memory   Following Commands Follows one step commands with increased time or repetition   Comments increased lethargy with confusion noted and intermittent non sensical conversation at time.   Subjective   Subjective I just want to go.  I feel numb.   Bed Mobility   Rolling R 4  Minimal assistance   Additional items Assist x 1;Bedrails;Increased time required;Verbal cues;LE management   Supine to Sit 2  Maximal assistance   Additional items Assist x 2;HOB elevated;Bedrails;Increased time required;Verbal cues;LE management   Additional Comments pt remained seated out of bed in chair post PT session.   Transfers   Sliding Board transfer 1  Dependent   Additional items Assist x 2;Increased time required;Verbal cues   Additional Comments s/b trasnfer from bed to chair.   Balance   Static Sitting Fair   Dynamic Sitting Poor   Static Standing Zero   Dynamic Standing   (zero)   Ambulatory Zero   Endurance Deficit   Endurance Deficit Description fatigue, weakness, deconditioning   Activity Tolerance   Activity Tolerance Patient limited by fatigue   Medical Staff Made Aware OTCandelaria GARZA   Exercises   Hip Flexion Sitting;5  reps;AAROM;AROM;Bilateral  (tactile and verbal cues for correct performance, technique and to remain at task.)   Hip Adduction Sitting;10 reps;AROM;Bilateral  (isometric hip add.)   Knee AROM Long Arc Quad Sitting;AROM;Bilateral;10 reps  (tactile and verbal cues for correct performance, technique and to remain at task.)   Ankle Pumps Sitting;10 reps;AROM;Bilateral   Assessment   Prognosis Guarded   Problem List Decreased strength;Decreased endurance;Impaired balance;Decreased mobility;Decreased cognition;Impaired judgement;Decreased safety awareness;Decreased skin integrity  (WBS)   Assessment Pt seen for PT treatment session this date with interventions consisting of bed mobility, transfer training, and HEP, and education provided as needed for safety and direction to improve functional mobility, safety awareness, and activity tolerance. Pt agreeable to PT treatment session upon arrival, pt found supine in bed . At end of session, pt left  seated out of bed in chair with all needs in reach. In comparison to previous session, pt with no improvement activity tolerance, endurance, standing balance, b/l le strength, AM- pac score, and functional mobility. Pt  is requiring increased assistance for all aspects of mobility,  due to decreased cognition, lethargy, decreased overall strength, mobility,  balance, endurance and activity tolerance and limitations in WBS. Pt  requires verbal and tactile cues to maintain at task. Poor effort noted with quick fatigue with activities, mobility and le there ex.   Pt required frequent verbal cues to open eyes and maintain level of alertness t/o PT session. Pt is functioning at max assist x2 for supine to sit and dependant assist x2 for sb transfer from bed to chair. Increased assistance, verbal and tactile cues to perform and complete al b/l le exercises.    Continue to recommend  level II moderate rehab resource intensity  at time of d/c in order to maximize pt's functional  independence and safety w/ mobility. Pt continues to be functioning below baseline level. PT will continue to see pt while here in order to address the deficits listed above and provide interventions consistent w/ POC in effort to achieve STGs.    The patient's AM-[facility] Basic Mobility Inpatient Short Form Raw Score is 6. A raw score less than 16 suggests the patient may benefit from discharge to post-acute rehabilitation services. Please also refer to the recommendation of the Physical Therapist for safe discharge planning.   Goals   Patient Goals none stated   STG Expiration Date 08/09/24   PT Treatment Day 5   Plan   Treatment/Interventions Functional transfer training;LE strengthening/ROM;Therapeutic exercise;Endurance training;Cognitive reorientation;Patient/family training;Equipment eval/education;Bed mobility;Spoke to nursing;OT   Progress Slow progress, decreased activity tolerance  (lethargy, increased confusion)   PT Frequency 3-5x/wk   Discharge Recommendation   Rehab Resource Intensity Level, PT II (Moderate Resource Intensity)   AM-PAC Basic Mobility Inpatient   Turning in Flat Bed Without Bedrails 1   Lying on Back to Sitting on Edge of Flat Bed Without Bedrails 1   Moving Bed to Chair 1   Standing Up From Chair Using Arms 1   Walk in Room 1   Climb 3-5 Stairs With Railing 1   Basic Mobility Inpatient Raw Score 6   Turning Head Towards Sound 2   Follow Simple Instructions 2   Low Function Basic Mobility Raw Score  10   Low Function Basic Mobility Standardized Score  14.65   [facility] Highest Level Of Mobility   -HLM Goal 2: Bed activities/Dependent transfer   -HLM Achieved 4: Move to chair/commode   Education   Education Provided Mobility training;Home exercise program;Assistive device   Patient Reinforcement needed   End of Consult   Patient Position at End of Consult Bed/Chair alarm activated;All needs within reach;Bedside chair   End of Consult Comments pt in stable condition        08/08/24  0857   Note Type   Note Type Treatment   Pain Assessment   Pain Assessment Tool 0-10   Restrictions/Precautions   RLE Weight Bearing Per Order NWB   LLE Weight Bearing Per Order WBAT   Other Precautions Cognitive;Chair Alarm;Bed Alarm;WBS;Multiple lines;Fall Risk;Pain  (wound vac)   Cognition   Overall Cognitive Status Impaired   Arousal/Participation Lethargic;Persistent stimuli required   Attention Attends with cues to redirect   Orientation Level Oriented to person;Disoriented to place;Disoriented to time;Disoriented to situation   Memory Decreased recall of precautions;Decreased recall of recent events;Decreased short term memory   Following Commands Follows one step commands with increased time or repetition   Comments increased lethargy with confusion noted and intermittent non sensical conversation at time.   Subjective   Subjective I just want to go.  I feel numb.   Bed Mobility   Rolling R 4  Minimal assistance   Additional items Assist x 1;Bedrails;Increased time required;Verbal cues;LE management   Supine to Sit 2  Maximal assistance   Additional items Assist x 2;HOB elevated;Bedrails;Increased time required;Verbal cues;LE management   Additional Comments pt remained seated out of bed in chair post PT session.   Transfers   Sliding Board transfer 1  Dependent   Additional items Assist x 2;Increased time required;Verbal cues   Additional Comments s/b trasnfer from bed to chair.   Balance   Static Sitting Fair   Dynamic Sitting Poor   Static Standing Zero   Dynamic Standing   (zero)   Ambulatory Zero   Endurance Deficit   Endurance Deficit Description fatigue, weakness, deconditioning   Activity Tolerance   Activity Tolerance Patient limited by fatigue   Medical Staff Made Aware OTR, Candelaria   Exercises   Hip Flexion Sitting;5 reps;AAROM;AROM;Bilateral  (tactile and verbal cues for correct performance, technique and to remain at task.)   Hip Adduction Sitting;10 reps;AROM;Bilateral  (isometric hip add.)    Knee AROM Long Arc Quad Sitting;AROM;Bilateral;10 reps  (tactile and verbal cues for correct performance, technique and to remain at task.)   Ankle Pumps Sitting;10 reps;AROM;Bilateral   Assessment   Prognosis Guarded   Problem List Decreased strength;Decreased endurance;Impaired balance;Decreased mobility;Decreased cognition;Impaired judgement;Decreased safety awareness;Decreased skin integrity  (WBS)   Assessment Pt seen for PT treatment session this date with interventions consisting of bed mobility, transfer training, and HEP, and education provided as needed for safety and direction to improve functional mobility, safety awareness, and activity tolerance. Pt agreeable to PT treatment session upon arrival, pt found supine in bed . At end of session, pt left  seated out of bed in chair with all needs in reach. In comparison to previous session, pt with no improvement activity tolerance, endurance, standing balance, b/l le strength, AM- pac score, and functional mobility. Pt  is requiring increased assistance for all aspects of mobility,  due to decreased cognition, lethargy, decreased overall strength, mobility,  balance, endurance and activity tolerance and limitations in WBS. Pt  requires verbal and tactile cues to maintain at task. Poor effort noted with quick fatigue with activities, mobility and le there ex.   Pt required frequent verbal cues to open eyes and maintain level of alertness t/o PT session. Pt is functioning at max assist x2 for supine to sit and dependant assist x2 for sb transfer from bed to chair. Increased assistance, verbal and tactile cues to perform and complete al b/l le exercises.    Continue to recommend  level II moderate rehab resource intensity  at time of d/c in order to maximize pt's functional independence and safety w/ mobility. Pt continues to be functioning below baseline level. PT will continue to see pt while here in order to address the deficits listed above and provide  interventions consistent w/ POC in effort to achieve STGs.    The patient's AM-PAC Basic Mobility Inpatient Short Form Raw Score is 6. A raw score less than 16 suggests the patient may benefit from discharge to post-acute rehabilitation services. Please also refer to the recommendation of the Physical Therapist for safe discharge planning.   Goals   Patient Goals none stated   STG Expiration Date 08/09/24   PT Treatment Day 5   Plan   Treatment/Interventions Functional transfer training;LE strengthening/ROM;Therapeutic exercise;Endurance training;Cognitive reorientation;Patient/family training;Equipment eval/education;Bed mobility;Spoke to nursing;OT   Progress Slow progress, decreased activity tolerance  (lethargy, increased confusion)   PT Frequency 3-5x/wk   Discharge Recommendation   Rehab Resource Intensity Level, PT II (Moderate Resource Intensity)   AM-PAC Basic Mobility Inpatient   Turning in Flat Bed Without Bedrails 1   Lying on Back to Sitting on Edge of Flat Bed Without Bedrails 1   Moving Bed to Chair 1   Standing Up From Chair Using Arms 1   Walk in Room 1   Climb 3-5 Stairs With Railing 1   Basic Mobility Inpatient Raw Score 6   Turning Head Towards Sound 2   Follow Simple Instructions 2   Low Function Basic Mobility Raw Score  10   Low Function Basic Mobility Standardized Score  14.65   [facility] Highest Level Of Mobility   -HL Goal 2: Bed activities/Dependent transfer   -HLM Achieved 4: Move to chair/commode   Education   Education Provided Mobility training;Home exercise program;Assistive device   Patient Reinforcement needed   End of Consult   Patient Position at End of Consult Bed/Chair alarm activated;All needs within reach;Bedside chair   End of Consult Comments pt in stable condition     Prabha Okeefe, PTA

## 2024-08-08 NOTE — PLAN OF CARE
Problem: PAIN - ADULT  Goal: Verbalizes/displays adequate comfort level or baseline comfort level  Description: Interventions:  - Encourage patient to monitor pain and request assistance  - Assess pain using appropriate pain scale  - Administer analgesics based on type and severity of pain and evaluate response  - Implement non-pharmacological measures as appropriate and evaluate response  - Consider cultural and social influences on pain and pain management  - Notify physician/advanced practitioner if interventions unsuccessful or patient reports new pain  Outcome: Progressing     Problem: INFECTION - ADULT  Goal: Absence or prevention of progression during hospitalization  Description: INTERVENTIONS:  - Assess and monitor for signs and symptoms of infection  - Monitor lab/diagnostic results  - Monitor all insertion sites, i.e. indwelling lines, tubes, and drains  - Monitor endotracheal if appropriate and nasal secretions for changes in amount and color  - [place] appropriate cooling/warming therapies per order  - Administer medications as ordered  - Instruct and encourage patient and family to use good hand hygiene technique  - Identify and instruct in appropriate isolation precautions for identified infection/condition  Outcome: Progressing     Problem: SAFETY ADULT  Goal: Patient will remain free of falls  Description: INTERVENTIONS:  - Educate patient/family on patient safety including physical limitations  - Instruct patient to call for assistance with activity   - Consult OT/PT to assist with strengthening/mobility   - Keep Call bell within reach  - Keep bed low and locked with side rails adjusted as appropriate  - Keep care items and personal belongings within reach  - Initiate and maintain comfort rounds  - Make Fall Risk Sign visible to staff  - Offer Toileting every 2 Hours, in advance of need  - Initiate/Maintain bed alarm  - Apply yellow socks and bracelet for high fall risk patients  - Consider  moving patient to room near nurses station  Outcome: Progressing  Goal: Maintain or return to baseline ADL function  Description: INTERVENTIONS:  -  Assess patient's ability to carry out ADLs; assess patient's baseline for ADL function and identify physical deficits which impact ability to perform ADLs (bathing, care of mouth/teeth, toileting, grooming, dressing, etc.)  - Assess/evaluate cause of self-care deficits   - Assess range of motion  - Assess patient's mobility; develop plan if impaired  - Assess patient's need for assistive devices and provide as appropriate  - Encourage maximum independence but intervene and supervise when necessary  - Involve family in performance of ADLs  - Assess for home care needs following discharge   - Consider OT consult to assist with ADL evaluation and planning for discharge  - Provide patient education as appropriate  Outcome: Progressing  Goal: Maintains/Returns to pre admission functional level  Description: INTERVENTIONS:  - Perform AM-PAC 6 Click Basic Mobility/ Daily Activity assessment daily.  - Set and communicate daily mobility goal to care team and patient/family/caregiver.   - Collaborate with rehabilitation services on mobility goals if consulted  - Stand patient 3 times a day  - Ambulate patient 3 times a day  - Out of bed to chair 3 times a day   - Out of bed for meals 3 times a day  - Out of bed for toileting  - Record patient progress and toleration of activity level   Outcome: Progressing     Problem: DISCHARGE PLANNING  Goal: Discharge to home or other facility with appropriate resources  Description: INTERVENTIONS:  - Identify barriers to discharge w/patient and caregiver  - Arrange for needed discharge resources and transportation as appropriate  - Identify discharge learning needs (meds, wound care, etc.)  - Arrange for interpretive services to assist at discharge as needed  - Refer to Case Management Department for coordinating discharge planning if the  patient needs post-hospital services based on physician/advanced practitioner order or complex needs related to functional status, cognitive ability, or social support system  Outcome: Progressing     Problem: Knowledge Deficit  Goal: Patient/family/caregiver demonstrates understanding of disease process, treatment plan, medications, and discharge instructions  Description: Complete learning assessment and assess knowledge base.  Interventions:  - Provide teaching at level of understanding  - Provide teaching via preferred learning methods  Outcome: Progressing     Problem: CARDIOVASCULAR - ADULT  Goal: Maintains optimal cardiac output and hemodynamic stability  Description: INTERVENTIONS:  - Monitor I/O, vital signs and rhythm  - Monitor for S/S and trends of decreased cardiac output  - Administer and titrate ordered vasoactive medications to optimize hemodynamic stability  - Assess quality of pulses, skin color and temperature  - Assess for signs of decreased coronary artery perfusion  - Instruct patient to report change in severity of symptoms  Outcome: Progressing     Problem: METABOLIC, FLUID AND ELECTROLYTES - ADULT  Goal: Electrolytes maintained within normal limits  Description: INTERVENTIONS:  - Monitor labs and assess patient for signs and symptoms of electrolyte imbalances  - Administer electrolyte replacement as ordered  - Monitor response to electrolyte replacements, including repeat lab results as appropriate  - Instruct patient on fluid and nutrition as appropriate  Outcome: Progressing  Goal: Fluid balance maintained  Description: INTERVENTIONS:  - Monitor labs   - Monitor I/O and WT  - Instruct patient on fluid and nutrition as appropriate  - Assess for signs & symptoms of volume excess or deficit  Outcome: Progressing  Goal: Glucose maintained within target range  Description: INTERVENTIONS:  - Monitor Blood Glucose as ordered  - Assess for signs and symptoms of hyperglycemia and hypoglycemia  -  Administer ordered medications to maintain glucose within target range  - Assess nutritional intake and initiate nutrition service referral as needed  Outcome: Progressing     Problem: HEMATOLOGIC - ADULT  Goal: Maintains hematologic stability  Description: INTERVENTIONS  - Assess for signs and symptoms of bleeding or hemorrhage  - Monitor labs  - Administer supportive blood products/factors as ordered and appropriate  Outcome: Progressing     Problem: MUSCULOSKELETAL - ADULT  Goal: Maintain or return mobility to safest level of function  Description: INTERVENTIONS:  - Assess patient's ability to carry out ADLs; assess patient's baseline for ADL function and identify physical deficits which impact ability to perform ADLs (bathing, care of mouth/teeth, toileting, grooming, dressing, etc.)  - Assess/evaluate cause of self-care deficits   - Assess range of motion  - Assess patient's mobility  - Assess patient's need for assistive devices and provide as appropriate  - Encourage maximum independence but intervene and supervise when necessary  - Involve family in performance of ADLs  - Assess for home care needs following discharge   - Consider OT consult to assist with ADL evaluation and planning for discharge  - Provide patient education as appropriate  Outcome: Progressing  Goal: Maintain proper alignment of affected body part  Description: INTERVENTIONS:  - Support, maintain and protect limb and body alignment  - Provide patient/ family with appropriate education  Outcome: Progressing     Problem: Prexisting or High Potential for Compromised Skin Integrity  Goal: Skin integrity is maintained or improved  Description: INTERVENTIONS:  - Identify patients at risk for skin breakdown  - Assess and monitor skin integrity  - Assess and monitor nutrition and hydration status  - Monitor labs   - Assess for incontinence   - Turn and reposition patient  - Assist with mobility/ambulation  - Relieve pressure over bony  prominences  - Avoid friction and shearing  - Provide appropriate hygiene as needed including keeping skin clean and dry  - Evaluate need for skin moisturizer/barrier cream  - Collaborate with interdisciplinary team   - Patient/family teaching  - Consider wound care consult   Outcome: Progressing     Problem: Nutrition/Hydration-ADULT  Goal: Nutrient/Hydration intake appropriate for improving, restoring or maintaining nutritional needs  Description: Monitor and assess patient's nutrition/hydration status for malnutrition. Collaborate with interdisciplinary team and initiate plan and interventions as ordered.  Monitor patient's weight and dietary intake as ordered or per policy. Utilize nutrition screening tool and intervene as necessary. Determine patient's food preferences and provide high-protein, high-caloric foods as appropriate.     INTERVENTIONS:  - Monitor oral intake, urinary output, labs, and treatment plans  - Assess nutrition and hydration status and recommend course of action  - Evaluate amount of meals eaten  - Assist patient with eating if necessary   - Allow adequate time for meals  - Recommend/ encourage appropriate diets, oral nutritional supplements, and vitamin/mineral supplements  - Order, calculate, and assess calorie counts as needed  - Recommend, monitor, and adjust tube feedings and TPN/PPN based on assessed needs  - Assess need for intravenous fluids  - Provide specific nutrition/hydration education as appropriate  - Include patient/family/caregiver in decisions related to nutrition  Outcome: Progressing     Problem: NEUROSENSORY - ADULT  Goal: Achieves maximal functionality and self care  Description: INTERVENTIONS  - Monitor swallowing and airway patency with patient fatigue and changes in neurological status  - Encourage and assist patient to increase activity and self care.   - Encourage visually impaired, hearing impaired and aphasic patients to use assistive/communication  devices  Outcome: Progressing

## 2024-08-08 NOTE — PROGRESS NOTES
[facility]  Progress Note  Name: Anamaria Parnell I  MRN: [id number]  Unit/Bed#: [place] [address] -01 I Date of Admission: 7/19/2024   Date of Service: 8/8/2024 I Hospital Day: 20    Assessment & Plan   * Aortoiliac occlusive disease (HCC)  Assessment & Plan  History of PAD CAD diabetes and hypertension presented to [facility] for right great toe gangrene transferred here for vascular bypass.  This admission: Status post bilateral femoral endarterectomy with bypass and JACI/EIA stenting  VAC still present.  Continue aspirin and Brilinta.  Gangrene of toe/cellulitis.  Status post first ray amputation.  Completed course of ceftriaxone per ID.  No further vascular/podiatry interventions anticipated.  Disposition: Possibly tomorrow if no GI intervention is anticipated    Acute blood loss anemia  Assessment & Plan  Acute blood loss anemia from procedures.  Has received 5 units of PRBC since admission.  Downtrending hemoglobin with melanotic stools.  Discussed with GI.  Remains high risk for endoscopy.  Continue pantoprazole twice daily.  To decide on endoscopy tomorrow vs empiric treatment    Results from last 7 days   Lab Units 08/08/24  0516 08/07/24  0543 08/04/24  0434 08/03/24  2209   HEMOGLOBIN g/dL 8.3* 8.7* 10.1* 10.0*       Acute liver failure without hepatic coma  Assessment & Plan  Shock liver related to anemia/hypotension.  LFTs normalized    Results from last 7 days   Lab Units 08/08/24  0516 08/07/24  0543 08/04/24  0434 08/02/24  0520   AST U/L 19 24 52* 362*   ALT U/L 25 30 103* 246*   TOTAL BILIRUBIN mg/dL 1.19* 1.11* 0.96 1.19*       Chronic HFrEF, LVEF 30%, LVIDd 4.6 cm, AHA Stage C  Assessment & Plan  Compensated.  Continue torsemide    Paroxysmal atrial fibrillation (HCC)  Assessment & Plan  New onset atrial fibrillation this admission multifactorial from surgery/anemia.  Did have bradycardia thought secondary to high-dose metoprolol.  No further episodes.  No need for  pacemaker.  Continue metoprolol 25 mg daily.  Anticoagulation: Cardiology started Eliquis.    Coronary artery disease of native artery of native heart with stable angina pectoris (HCC)  Assessment & Plan  CAD with history of PCI.  No chest pain.  On DAPT  Continue brilinta.    Primary hypertension  Assessment & Plan  Blood pressures are stable.  Off amlodipine and clonidine.  Labetalol changed to metoprolol by cardiology    Type 2 diabetes mellitus, without long-term current use of insulin (HCC)  Assessment & Plan  Lab Results   Component Value Date    HGBA1C 5.9 (H) 07/09/2024     Recent Labs     08/07/24  2100 08/08/24  0715 08/08/24  1103 08/08/24  1607   POCGLU 179* 189* 165* 156*     Stable continue levemir 10 units and lispro 5 units 3 times daily.    Depression, recurrent (HCC)  Assessment & Plan  Mood stable continue escitalopram    VTE Pharmacologic Prophylaxis: VTE Score: 3 Moderate Risk (Score 3-4) - Pharmacological DVT Prophylaxis Ordered: apixaban (Eliquis).    Mobility:  Basic Mobility Inpatient Raw Score: 8  -HL Goal: 3: Sit at edge of bed  JH-HLM Achieved: 3: Sit at edge of bed  JH-HLM Goal achieved. Continue to encourage appropriate mobility.    Patient Centered Rounds: I have performed bedside rounds with nursing staff today.  Discussions with Specialists or Other Care Team Provider: Case management GI    Education and Discussions with Family / Patient: Updated  (son) at bedside.    Time Spent for Care:   This time was spent on one or more of the following: performing physical exam; counseling and coordination of care; obtaining or reviewing history; documenting in the medical record; reviewing/ordering tests, medications or procedures; communicating with other healthcare professionals and discussing with patient's family/caregivers.    Current Length of Stay: 20 day(s)  Current Patient Status: Inpatient   Certification Statement: The patient will continue to require additional  "inpatient hospital stay due to reevaluating hemoglobin tomorrow due to melena  Discharge Plan: Anticipate discharge tomorrow to rehab facility.    Code Status: Level 3 - DNAR and DNI      Subjective:   Patient seen and examined.  No abdominal pain today while NPO.    Objective:   Vitals: Blood pressure 125/66, pulse 80, temperature (!) 97.4 °F (36.3 °C), resp. rate 18, height 4' 10\" (1.473 m), weight 66.7 kg (147 lb 0.8 oz), SpO2 100%.    Intake/Output Summary (Last 24 hours) at 8/8/2024 1801  Last data filed at 8/8/2024 1643  Gross per 24 hour   Intake 640 ml   Output 719 ml   Net -79 ml       Physical Exam  Vitals reviewed.   Constitutional:       General: She is not in acute distress.     Appearance: Normal appearance.   HENT:      Head: Atraumatic.   Eyes:      Extraocular Movements: Extraocular movements intact.   Cardiovascular:      Rate and Rhythm: Regular rhythm.   Pulmonary:      Breath sounds: Decreased breath sounds present. No wheezing.   Abdominal:      General: Bowel sounds are normal.      Palpations: Abdomen is soft.   Musculoskeletal:         General: No swelling.   Skin:     General: Skin is warm.   Neurological:      General: No focal deficit present.      Mental Status: She is alert.   Psychiatric:         Mood and Affect: Mood normal.       Additional Data:   Labs:  Results from last 7 days   Lab Units 08/08/24 0516 08/07/24 0543 08/04/24 0434   WBC Thousand/uL 22.92* 25.59* 16.63*   HEMOGLOBIN g/dL 8.3* 8.7* 10.1*   PLATELETS Thousands/uL 322 388 283   MCV fL 103* 101* 97     Results from last 7 days   Lab Units 08/08/24  0516 08/07/24  0543 08/04/24 2012 08/04/24  0434   SODIUM mmol/L 144 141  --  141   POTASSIUM mmol/L 3.3* 4.0 4.4 2.9*   CHLORIDE mmol/L 101 100  --  99   CO2 mmol/L 31 27  --  32   ANION GAP mmol/L 12 14*  --  10   BUN mg/dL 76* 67*  --  73*   CREATININE mg/dL 1.84* 1.58*  --  1.52*   CALCIUM mg/dL 8.5 8.7  --  8.4   ALBUMIN g/dL 2.8* 2.9*  --  3.0*   TOTAL BILIRUBIN " mg/dL 1.19* 1.11*  --  0.96   ALK PHOS U/L 87 77  --  120*   ALT U/L 25 30  --  103*   AST U/L 19 24  --  52*   EGFR ml/min/1.73sq m 26 31  --  32   GLUCOSE RANDOM mg/dL 165* 117  --  135     Results from last 7 days   Lab Units 08/04/24  0434 08/02/24  0520   MAGNESIUM mg/dL 1.8* 2.1   PHOSPHORUS mg/dL 3.3 3.9                      Results from last 7 days   Lab Units 08/08/24  1607 08/08/24  1103 08/08/24  0715 08/07/24  2100 08/07/24  1553 08/07/24  1120 08/07/24  0807 08/06/24  2108 08/06/24  1555 08/06/24  1128 08/06/24  0721 08/05/24  2110   POC GLUCOSE mg/dl 156* 165* 189* 179* 195* 210* 132 99 133 174* 173* 164*             * I Have Reviewed All Lab Data Listed Above.    Recent cultures:             Results from last 7 days   Lab Units 08/03/24  2236   FECAL OCCULT BLOOD DIAGNOSTIC  Positive*   FECAL OCCULT BLOOD DIAGNOSTIC 2  Positive*       Lines/Drains:  Invasive Devices       Peripheral Intravenous Line  Duration             Peripheral IV 07/31/24 Right Antecubital 8 days              Drain  Duration             External Urinary Catheter <1 day                          Telemetry:      Imaging:  Imaging Reports Reviewed Today Include:       Scheduled Meds:  Current Facility-Administered Medications   Medication Dose Route Frequency Provider Last Rate    allopurinol  100 mg Oral Daily Costa Omer DPM      apixaban  5 mg Oral BID Dennise Campbell PA-C      escitalopram  20 mg Oral Daily Costa Omer DPM      HYDROmorphone  0.2 mg Intravenous Q6H PRN Costa Omer DPM      insulin detemir  10 Units Subcutaneous Daily Cullen Reinoso MD      insulin lispro  1-5 Units Subcutaneous TID AC Costa Omer DPM      insulin lispro  5 Units Subcutaneous TID With Meals Cullen Reinoso MD      lidocaine  1 patch Topical Daily Costa Omer DPM      metoprolol succinate  25 mg Oral Daily Timothy Rey DO      ondansetron  4 mg Intravenous Q6H PRN Costa Omer DPM       oxyCODONE  5 mg Oral Q6H PRN Costa Omer DPM      pantoprazole  40 mg Intravenous Q12H JAVIER Woods DO      sodium bicarbonate  650 mg Oral BID after meals Costa Omer DPM      ticagrelor  90 mg Oral Q12H [facility] Costa Omer DPM      torsemide  10 mg Oral Daily Costa Omer DPM         Today, Patient Was Seen By: Akira Flores DO    ** Please Note: Dictation voice to text software may have been used in the creation of this document. **

## 2024-08-09 VITALS
DIASTOLIC BLOOD PRESSURE: 84 MMHG | HEART RATE: 78 BPM | SYSTOLIC BLOOD PRESSURE: 136 MMHG | HEIGHT: 58 IN | WEIGHT: 150.13 LBS | TEMPERATURE: 97.4 F | RESPIRATION RATE: 16 BRPM | BODY MASS INDEX: 31.51 KG/M2 | OXYGEN SATURATION: 99 %

## 2024-08-09 LAB
ALBUMIN SERPL BCG-MCNC: 2.7 G/DL (ref 3.5–5)
ALP SERPL-CCNC: 80 U/L (ref 34–104)
ALT SERPL W P-5'-P-CCNC: 18 U/L (ref 7–52)
ANION GAP SERPL CALCULATED.3IONS-SCNC: 9 MMOL/L (ref 4–13)
AST SERPL W P-5'-P-CCNC: 16 U/L (ref 13–39)
BILIRUB SERPL-MCNC: 1.56 MG/DL (ref 0.2–1)
BUN SERPL-MCNC: 72 MG/DL (ref 5–25)
CALCIUM ALBUM COR SERPL-MCNC: 9.3 MG/DL (ref 8.3–10.1)
CALCIUM SERPL-MCNC: 8.3 MG/DL (ref 8.4–10.2)
CHLORIDE SERPL-SCNC: 103 MMOL/L (ref 96–108)
CO2 SERPL-SCNC: 33 MMOL/L (ref 21–32)
CREAT SERPL-MCNC: 1.77 MG/DL (ref 0.6–1.3)
ERYTHROCYTE [DISTWIDTH] IN BLOOD BY AUTOMATED COUNT: 23.7 % (ref 11.6–15.1)
GFR SERPL CREATININE-BSD FRML MDRD: 27 ML/MIN/1.73SQ M
GLUCOSE SERPL-MCNC: 161 MG/DL (ref 65–140)
GLUCOSE SERPL-MCNC: 163 MG/DL (ref 65–140)
GLUCOSE SERPL-MCNC: 172 MG/DL (ref 65–140)
HCT VFR BLD AUTO: 27.3 % (ref 34.8–46.1)
HGB BLD-MCNC: 8 G/DL (ref 11.5–15.4)
MCH RBC QN AUTO: 29.4 PG (ref 26.8–34.3)
MCHC RBC AUTO-ENTMCNC: 29.3 G/DL (ref 31.4–37.4)
MCV RBC AUTO: 100 FL (ref 82–98)
PLATELET # BLD AUTO: 265 THOUSANDS/UL (ref 149–390)
PMV BLD AUTO: 11.2 FL (ref 8.9–12.7)
POTASSIUM SERPL-SCNC: 3.1 MMOL/L (ref 3.5–5.3)
PROT SERPL-MCNC: 5.1 G/DL (ref 6.4–8.4)
RBC # BLD AUTO: 2.72 MILLION/UL (ref 3.81–5.12)
SODIUM SERPL-SCNC: 145 MMOL/L (ref 135–147)
WBC # BLD AUTO: 17.89 THOUSAND/UL (ref 4.31–10.16)

## 2024-08-09 PROCEDURE — 99239 HOSP IP/OBS DSCHRG MGMT >30: CPT | Performed by: INTERNAL MEDICINE

## 2024-08-09 PROCEDURE — 80053 COMPREHEN METABOLIC PANEL: CPT | Performed by: INTERNAL MEDICINE

## 2024-08-09 PROCEDURE — 85027 COMPLETE CBC AUTOMATED: CPT | Performed by: INTERNAL MEDICINE

## 2024-08-09 PROCEDURE — 99024 POSTOP FOLLOW-UP VISIT: CPT | Performed by: NURSE PRACTITIONER

## 2024-08-09 PROCEDURE — 82948 REAGENT STRIP/BLOOD GLUCOSE: CPT

## 2024-08-09 RX ORDER — SODIUM BICARBONATE 650 MG/1
650 TABLET ORAL
Status: ON HOLD
Start: 2024-08-09

## 2024-08-09 RX ORDER — OXYCODONE HYDROCHLORIDE 5 MG/1
5 TABLET ORAL EVERY 6 HOURS PRN
Qty: 10 TABLET | Refills: 0 | Status: ON HOLD | OUTPATIENT
Start: 2024-08-09 | End: 2024-08-19

## 2024-08-09 RX ORDER — METOPROLOL SUCCINATE 25 MG/1
25 TABLET, EXTENDED RELEASE ORAL DAILY
Status: ON HOLD
Start: 2024-08-10

## 2024-08-09 RX ORDER — PANTOPRAZOLE SODIUM 40 MG/1
40 TABLET, DELAYED RELEASE ORAL 2 TIMES DAILY
Status: ON HOLD
Start: 2024-08-09

## 2024-08-09 RX ORDER — TORSEMIDE 10 MG/1
10 TABLET ORAL DAILY
Status: ON HOLD
Start: 2024-08-10

## 2024-08-09 RX ADMIN — TICAGRELOR 90 MG: 90 TABLET ORAL at 07:57

## 2024-08-09 RX ADMIN — PANTOPRAZOLE SODIUM 40 MG: 40 INJECTION, POWDER, FOR SOLUTION INTRAVENOUS at 07:57

## 2024-08-09 RX ADMIN — INSULIN DETEMIR 10 UNITS: 100 INJECTION, SOLUTION SUBCUTANEOUS at 07:57

## 2024-08-09 RX ADMIN — APIXABAN 5 MG: 5 TABLET, FILM COATED ORAL at 07:57

## 2024-08-09 RX ADMIN — SODIUM BICARBONATE 650 MG TABLET 650 MG: at 07:57

## 2024-08-09 RX ADMIN — ALLOPURINOL 100 MG: 100 TABLET ORAL at 07:57

## 2024-08-09 RX ADMIN — ESCITALOPRAM OXALATE 20 MG: 20 TABLET ORAL at 07:57

## 2024-08-09 RX ADMIN — TORSEMIDE 10 MG: 10 TABLET ORAL at 07:57

## 2024-08-09 RX ADMIN — METOPROLOL SUCCINATE 25 MG: 25 TABLET, EXTENDED RELEASE ORAL at 07:57

## 2024-08-09 NOTE — PLAN OF CARE
Problem: PAIN - ADULT  Goal: Verbalizes/displays adequate comfort level or baseline comfort level  Description: Interventions:  - Encourage patient to monitor pain and request assistance  - Assess pain using appropriate pain scale  - Administer analgesics based on type and severity of pain and evaluate response  - Implement non-pharmacological measures as appropriate and evaluate response  - Consider cultural and social influences on pain and pain management  - Notify physician/advanced practitioner if interventions unsuccessful or patient reports new pain  8/9/2024 1029 by Alicia Lynn RN  Outcome: Adequate for Discharge  8/9/2024 1028 by Alicia Lynn RN  Outcome: Adequate for Discharge  8/9/2024 0747 by Alicia Lynn RN  Outcome: Progressing     Problem: INFECTION - ADULT  Goal: Absence or prevention of progression during hospitalization  Description: INTERVENTIONS:  - Assess and monitor for signs and symptoms of infection  - Monitor lab/diagnostic results  - Monitor all insertion sites, i.e. indwelling lines, tubes, and drains  - Monitor endotracheal if appropriate and nasal secretions for changes in amount and color  - [place] appropriate cooling/warming therapies per order  - Administer medications as ordered  - Instruct and encourage patient and family to use good hand hygiene technique  - Identify and instruct in appropriate isolation precautions for identified infection/condition  8/9/2024 1029 by Alicia Lynn RN  Outcome: Adequate for Discharge  8/9/2024 1028 by Alicia Lynn RN  Outcome: Adequate for Discharge  8/9/2024 0747 by Alicia Lynn RN  Outcome: Progressing     Problem: SAFETY ADULT  Goal: Patient will remain free of falls  Description: INTERVENTIONS:  - Educate patient/family on patient safety including physical limitations  - Instruct patient to call for assistance with activity   - Consult OT/PT to assist with strengthening/mobility   - Keep Call bell  within reach  - Keep bed low and locked with side rails adjusted as appropriate  - Keep care items and personal belongings within reach  - Initiate and maintain comfort rounds  - Make Fall Risk Sign visible to staff  - Offer Toileting every 2 Hours, in advance of need  - Initiate/Maintain bed alarm  - Apply yellow socks and bracelet for high fall risk patients  - Consider moving patient to room near nurses station  8/9/2024 1029 by Alicia Lynn RN  Outcome: Adequate for Discharge  8/9/2024 1028 by Alicia Lynn RN  Outcome: Adequate for Discharge  8/9/2024 0747 by Alicia Lynn RN  Outcome: Progressing  Goal: Maintain or return to baseline ADL function  Description: INTERVENTIONS:  -  Assess patient's ability to carry out ADLs; assess patient's baseline for ADL function and identify physical deficits which impact ability to perform ADLs (bathing, care of mouth/teeth, toileting, grooming, dressing, etc.)  - Assess/evaluate cause of self-care deficits   - Assess range of motion  - Assess patient's mobility; develop plan if impaired  - Assess patient's need for assistive devices and provide as appropriate  - Encourage maximum independence but intervene and supervise when necessary  - Involve family in performance of ADLs  - Assess for home care needs following discharge   - Consider OT consult to assist with ADL evaluation and planning for discharge  - Provide patient education as appropriate  8/9/2024 1029 by Alicia Lynn RN  Outcome: Adequate for Discharge  8/9/2024 1028 by Alicia Lynn RN  Outcome: Adequate for Discharge  8/9/2024 0747 by Alicia Lynn RN  Outcome: Progressing  Goal: Maintains/Returns to pre admission functional level  Description: INTERVENTIONS:  - Perform AM-PAC 6 Click Basic Mobility/ Daily Activity assessment daily.  - Set and communicate daily mobility goal to care team and patient/family/caregiver.   - Collaborate with rehabilitation services on mobility  goals if consulted  - Stand patient 3 times a day  - Ambulate patient 3 times a day  - Out of bed to chair 3 times a day   - Out of bed for meals 3 times a day  - Out of bed for toileting  - Record patient progress and toleration of activity level   8/9/2024 1029 by Alicia Lynn RN  Outcome: Adequate for Discharge  8/9/2024 1028 by Alicia Lynn RN  Outcome: Adequate for Discharge  8/9/2024 0747 by Alicia Lynn RN  Outcome: Progressing     Problem: DISCHARGE PLANNING  Goal: Discharge to home or other facility with appropriate resources  Description: INTERVENTIONS:  - Identify barriers to discharge w/patient and caregiver  - Arrange for needed discharge resources and transportation as appropriate  - Identify discharge learning needs (meds, wound care, etc.)  - Arrange for interpretive services to assist at discharge as needed  - Refer to Case Management Department for coordinating discharge planning if the patient needs post-hospital services based on physician/advanced practitioner order or complex needs related to functional status, cognitive ability, or social support system  8/9/2024 1029 by Alicia Lynn RN  Outcome: Adequate for Discharge  8/9/2024 1028 by Alicia Lynn RN  Outcome: Adequate for Discharge  8/9/2024 0747 by Alicia Lynn RN  Outcome: Progressing     Problem: Knowledge Deficit  Goal: Patient/family/caregiver demonstrates understanding of disease process, treatment plan, medications, and discharge instructions  Description: Complete learning assessment and assess knowledge base.  Interventions:  - Provide teaching at level of understanding  - Provide teaching via preferred learning methods  8/9/2024 1029 by Alicia Lynn RN  Outcome: Adequate for Discharge  8/9/2024 1028 by Alicia Lynn RN  Outcome: Adequate for Discharge  8/9/2024 0747 by Alicia Lynn RN  Outcome: Progressing     Problem: CARDIOVASCULAR - ADULT  Goal: Maintains optimal  cardiac output and hemodynamic stability  Description: INTERVENTIONS:  - Monitor I/O, vital signs and rhythm  - Monitor for S/S and trends of decreased cardiac output  - Administer and titrate ordered vasoactive medications to optimize hemodynamic stability  - Assess quality of pulses, skin color and temperature  - Assess for signs of decreased coronary artery perfusion  - Instruct patient to report change in severity of symptoms  8/9/2024 1029 by Alicia Lynn RN  Outcome: Adequate for Discharge  8/9/2024 1028 by Alicia Lynn RN  Outcome: Adequate for Discharge  8/9/2024 0747 by Alicia Lynn RN  Outcome: Progressing     Problem: METABOLIC, FLUID AND ELECTROLYTES - ADULT  Goal: Electrolytes maintained within normal limits  Description: INTERVENTIONS:  - Monitor labs and assess patient for signs and symptoms of electrolyte imbalances  - Administer electrolyte replacement as ordered  - Monitor response to electrolyte replacements, including repeat lab results as appropriate  - Instruct patient on fluid and nutrition as appropriate  8/9/2024 1029 by Alicia Lynn RN  Outcome: Adequate for Discharge  8/9/2024 1028 by Alicia Lynn RN  Outcome: Adequate for Discharge  8/9/2024 0747 by Alicia Lynn RN  Outcome: Progressing  Goal: Fluid balance maintained  Description: INTERVENTIONS:  - Monitor labs   - Monitor I/O and WT  - Instruct patient on fluid and nutrition as appropriate  - Assess for signs & symptoms of volume excess or deficit  8/9/2024 1029 by Alicia Lynn RN  Outcome: Adequate for Discharge  8/9/2024 1028 by Alicia Lynn RN  Outcome: Adequate for Discharge  8/9/2024 0747 by Alicia Lynn RN  Outcome: Progressing  Goal: Glucose maintained within target range  Description: INTERVENTIONS:  - Monitor Blood Glucose as ordered  - Assess for signs and symptoms of hyperglycemia and hypoglycemia  - Administer ordered medications to maintain glucose within target  range  - Assess nutritional intake and initiate nutrition service referral as needed  8/9/2024 1029 by Alicia Lynn RN  Outcome: Adequate for Discharge  8/9/2024 1028 by Alicia Lynn RN  Outcome: Adequate for Discharge  8/9/2024 0747 by Alicia Lynn RN  Outcome: Progressing     Problem: HEMATOLOGIC - ADULT  Goal: Maintains hematologic stability  Description: INTERVENTIONS  - Assess for signs and symptoms of bleeding or hemorrhage  - Monitor labs  - Administer supportive blood products/factors as ordered and appropriate  8/9/2024 1029 by Alicia Lynn RN  Outcome: Adequate for Discharge  8/9/2024 1028 by Alicia Lynn RN  Outcome: Adequate for Discharge  8/9/2024 0747 by Alicia Lynn RN  Outcome: Progressing     Problem: MUSCULOSKELETAL - ADULT  Goal: Maintain or return mobility to safest level of function  Description: INTERVENTIONS:  - Assess patient's ability to carry out ADLs; assess patient's baseline for ADL function and identify physical deficits which impact ability to perform ADLs (bathing, care of mouth/teeth, toileting, grooming, dressing, etc.)  - Assess/evaluate cause of self-care deficits   - Assess range of motion  - Assess patient's mobility  - Assess patient's need for assistive devices and provide as appropriate  - Encourage maximum independence but intervene and supervise when necessary  - Involve family in performance of ADLs  - Assess for home care needs following discharge   - Consider OT consult to assist with ADL evaluation and planning for discharge  - Provide patient education as appropriate  8/9/2024 1029 by Alicia Lynn RN  Outcome: Adequate for Discharge  8/9/2024 1028 by Alicia Lynn RN  Outcome: Adequate for Discharge  8/9/2024 0747 by Alicia Lynn RN  Outcome: Progressing  Goal: Maintain proper alignment of affected body part  Description: INTERVENTIONS:  - Support, maintain and protect limb and body alignment  - Provide  patient/ family with appropriate education  8/9/2024 1029 by Alicia Lynn RN  Outcome: Adequate for Discharge  8/9/2024 1028 by Alicia Lynn RN  Outcome: Adequate for Discharge  8/9/2024 0747 by Alicia Lynn RN  Outcome: Progressing     Problem: Prexisting or High Potential for Compromised Skin Integrity  Goal: Skin integrity is maintained or improved  Description: INTERVENTIONS:  - Identify patients at risk for skin breakdown  - Assess and monitor skin integrity  - Assess and monitor nutrition and hydration status  - Monitor labs   - Assess for incontinence   - Turn and reposition patient  - Assist with mobility/ambulation  - Relieve pressure over bony prominences  - Avoid friction and shearing  - Provide appropriate hygiene as needed including keeping skin clean and dry  - Evaluate need for skin moisturizer/barrier cream  - Collaborate with interdisciplinary team   - Patient/family teaching  - Consider wound care consult   8/9/2024 1029 by Alicia Lynn RN  Outcome: Adequate for Discharge  8/9/2024 1028 by Alicia Lynn RN  Outcome: Adequate for Discharge  8/9/2024 0747 by Alicia Lynn RN  Outcome: Progressing     Problem: OCCUPATIONAL THERAPY ADULT  Goal: Performs self-care activities at highest level of function for planned discharge setting.  See evaluation for individualized goals.  Outcome: Adequate for Discharge     Problem: PHYSICAL THERAPY ADULT  Goal: Performs mobility at highest level of function for planned discharge setting.  See evaluation for individualized goals.  Description: Treatment/Interventions: Functional transfer training, LE strengthening/ROM, Therapeutic exercise, Patient/family training, Equipment eval/education, Bed mobility, Cognitive reorientation, Gait training, Compensatory technique education, Continued evaluation, Spoke to nursing, OT          See flowsheet documentation for full assessment, interventions and recommendations.  Outcome:  Adequate for Discharge     Problem: Nutrition/Hydration-ADULT  Goal: Nutrient/Hydration intake appropriate for improving, restoring or maintaining nutritional needs  Description: Monitor and assess patient's nutrition/hydration status for malnutrition. Collaborate with interdisciplinary team and initiate plan and interventions as ordered.  Monitor patient's weight and dietary intake as ordered or per policy. Utilize nutrition screening tool and intervene as necessary. Determine patient's food preferences and provide high-protein, high-caloric foods as appropriate.     INTERVENTIONS:  - Monitor oral intake, urinary output, labs, and treatment plans  - Assess nutrition and hydration status and recommend course of action  - Evaluate amount of meals eaten  - Assist patient with eating if necessary   - Allow adequate time for meals  - Recommend/ encourage appropriate diets, oral nutritional supplements, and vitamin/mineral supplements  - Order, calculate, and assess calorie counts as needed  - Recommend, monitor, and adjust tube feedings and TPN/PPN based on assessed needs  - Assess need for intravenous fluids  - Provide specific nutrition/hydration education as appropriate  - Include patient/family/caregiver in decisions related to nutrition  8/9/2024 1029 by Alicia Lynn RN  Outcome: Adequate for Discharge  8/9/2024 1028 by Alicia Lynn RN  Outcome: Adequate for Discharge  8/9/2024 0747 by Alicia Lynn RN  Outcome: Progressing     Problem: NEUROSENSORY - ADULT  Goal: Achieves maximal functionality and self care  Description: INTERVENTIONS  - Monitor swallowing and airway patency with patient fatigue and changes in neurological status  - Encourage and assist patient to increase activity and self care.   - Encourage visually impaired, hearing impaired and aphasic patients to use assistive/communication devices  8/9/2024 1029 by Alicia Lynn RN  Outcome: Adequate for Discharge  8/9/2024 1028 by  Alicia Lynn, RN  Outcome: Adequate for Discharge  8/9/2024 0747 by Alicia Lynn, RN  Outcome: Progressing

## 2024-08-09 NOTE — NURSING NOTE
Patient discharged to [facility]. IV removed per protocol. AVS was reviewed with receiving facility. Patient was sent with all belongings. All questions and concerns were addressed at this time.

## 2024-08-09 NOTE — CASE MANAGEMENT
Case Management Discharge Planning Note    Patient name Anamaria Parnell  Location [place] [address] /[place] 2 M* MRN [id number]  : 1945 Date 2024       Current Admission Date: 2024  Current Admission Diagnosis:Aortoiliac occlusive disease (HCC)   Patient Active Problem List    Diagnosis Date Noted Date Diagnosed    Patient refused evaluation 2024     Melena 2024     Acute blood loss anemia 2024     Encephalopathy 2024     Elevated troponin 2024     Acute liver failure without hepatic coma 2024     Transaminitis 2024     Pulmonary hypertension (HCC) 2024     Chronic HFrEF, LVEF 30%, LVIDd 4.6 cm, AHA Stage C 2024     Paroxysmal atrial fibrillation (HCC) 2024     Coronary artery disease of native artery of native heart with stable angina pectoris (HCC) 2024     Cellulitis of toe of right foot 2024     Sepsis (HCC) 2024     Diabetic ulcer of toe of right foot associated with diabetes mellitus due to underlying condition, limited to breakdown of skin (HCC) 2024     Carotid stenosis, asymptomatic, right 10/27/2023     Complex renal cyst 10/18/2023     Encounter for follow-up surveillance of urothelial carcinoma of lower urinary tract 10/18/2023     Encounter to discuss test results 10/17/2023     Smoking 2023     Renal cyst 2023     Primary hypertension 2023     Dysarthria 08/15/2023     Stage 3b chronic kidney disease (HCC)      Aortoiliac occlusive disease (HCC) 10/11/2022     History of gastrointestinal stromal tumor (GIST) 2022     Secondary hyperparathyroidism of renal origin (HCC) 2022     Gastrointestinal stromal tumor (GIST) (HCC) 2022     Type 2 diabetes mellitus, without long-term current use of insulin (HCC) 2021     Type 2 diabetes mellitus with diabetic peripheral angiopathy without gangrene (HCC) 2021     Depression, recurrent (HCC) 2021     Acute renal  failure (ARF) ([facility]) 11/03/2020     Hypertensive kidney disease with stage 4 chronic kidney disease ([facility]) 11/03/2020     Cervical radiculopathy 05/12/2020     Malignant neoplasm of overlapping sites of bladder ([facility]) 04/24/2020     Tachy-jeff syndrome ([facility]) 04/15/2020     Stenosis of left anterior descending artery Mid LAD 50-60% stenosis 04/01/2020     Right coronary artery occlusion ([facility])total occlusion of proximal RCA with collateral circ 04/01/2020     Pulmonary nodule 7 mm groundglass opacity in the right upper lobe, unchanged since October 2019 03/19/2020     Atherosclerosis of native arteries of right leg with ulceration of other part of foot ([facility]) 03/03/2020     Symptomatic carotid artery stenosis, left 03/03/2020     Femoral artery stenosis, right ([facility]) 50-75% stenosis in the common femoral artery. 01/14/2020     Mesenteric artery stenosis ([facility]) 01/14/2020     Positive cardiac stress test  small, mildly severe, partially reversible myocardial perfusion defect of anterior and inferior wall  11/12/2019     Abnormal CT of the chest suspicious for an infectious or inflammatory bronchiolitis. 11/07/2019     Celiac artery stenosis ([facility]) >70% stenosis in the celiac trunk 11/05/2019     Aortoiliac stenosis, left ([facility]) 02/25/2019     Spondylosis of lumbar region without myelopathy or radiculopathy 01/29/2019     Lumbosacral spondylosis without myelopathy 01/29/2019     Statin intolerance 01/22/2019     Lumbar disc herniation 01/22/2019     Herniated lumbar intervertebral disc 01/22/2019     Chronic GERD 12/04/2017     Acute renal failure superimposed on stage 3 chronic kidney disease ([facility]) 12/04/2017     Claudication in peripheral vascular disease ([facility]) 12/04/2017     Gout 12/04/2017     Hx-TIA (transient ischemic attack) 12/04/2017     Hyperlipidemia associated with type 2 diabetes mellitus  ([facility]) 12/04/2017     Hypertension associated with diabetes  ([facility]) 12/04/2017     Osteoarthritis 12/04/2017     Right renal  artery stenosis (HCC) 12/04/2017     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     Hyperlipidemia, unspecified 12/04/2017     Vitamin D deficiency 09/08/2016     Basilar artery stenosis 06/22/2016     Type 2 diabetes mellitus with diabetic nephropathy (HCC) 12/19/2015     Hypercholesteremia 12/19/2015     Hypertension 12/19/2015     H/o CVA 11/09/2015       LOS (days): 21  Geometric Mean LOS (GMLOS) (days): 6.5  Days to GMLOS:-14.2     OBJECTIVE:  Risk of Unplanned Readmission Score: 47.07         Current admission status: Inpatient   Preferred Pharmacy: No Pharmacies Listed  Primary Care Provider: Ritchie Lawton MD    Primary Insurance: [employer] REP  Secondary Insurance:     DISCHARGE DETAILS:    Discharge planning discussed with:: Pateint's sonInder  Freedom of Choice: Yes  Comments - Freedom of Choice: Patient and patient's son agreeable to STR at [facility] acute  CM contacted family/caregiver?: Yes (Patient's sonInder)  Were Treatment Team discharge recommendations reviewed with patient/caregiver?: Yes  Did patient/caregiver verbalize understanding of patient care needs?: N/A- going to facility  Were patient/caregiver advised of the risks associated with not following Treatment Team discharge recommendations?: Yes    Contacts  Patient Contacts: inder mcfarland (Son)  [phone] (Mobile)  Relationship to Patient:: Family  Contact Method: Phone  Phone Number: [phone]  Reason/Outcome: Continuity of Care, Emergency Contact, Discharge Planning    Requested Home Health Care         Is the patient interested in HHC at discharge?: No    DME Referral Provided  Referral made for DME?: No    Other Referral/Resources/Interventions Provided:  Interventions: Short Term Rehab  Referral Comments: STR- [facility]         Treatment Team Recommendation: Short Term Rehab  Discharge Destination Plan:: Short Term Rehab  Transport at Discharge : [facility]     Number/Name of Dispatcher: Milana  [phone]     ETA of Transport (Date): 08/09/24  ETA of Transport (Time): 1130     Transfer Mode: Stretcher  Accompanied by: Alone     IMM Given (Date):: 08/09/24  IMM Given to:: Family  Family notified:: Patient's sonAustin  Additional Comments: CM confirmed patient medically clear for discharge. CM spoke with patient's son- Austin, CM confirmed patient to go to [facility] acute. CM confirmed [facility] requested for 1130A.M with [facility]. CM reviewed potential copayment with [facility]; patient's son agreeable. CM provided patient's sonAustin with IMM - patient's son agreeable and understood right to appeal. CM confirmed with facility, bedside nurse and attending [facility] transport requested for 11:30A.M.  CM updated facility contacts. CM to follow for further discharge planning.    Saima Nj,

## 2024-08-09 NOTE — PLAN OF CARE
Problem: PAIN - ADULT  Goal: Verbalizes/displays adequate comfort level or baseline comfort level  Description: Interventions:  - Encourage patient to monitor pain and request assistance  - Assess pain using appropriate pain scale  - Administer analgesics based on type and severity of pain and evaluate response  - Implement non-pharmacological measures as appropriate and evaluate response  - Consider cultural and social influences on pain and pain management  - Notify physician/advanced practitioner if interventions unsuccessful or patient reports new pain  8/9/2024 1028 by Alicia Lynn RN  Outcome: Adequate for Discharge  8/9/2024 0747 by Alicia Lynn RN  Outcome: Progressing     Problem: INFECTION - ADULT  Goal: Absence or prevention of progression during hospitalization  Description: INTERVENTIONS:  - Assess and monitor for signs and symptoms of infection  - Monitor lab/diagnostic results  - Monitor all insertion sites, i.e. indwelling lines, tubes, and drains  - Monitor endotracheal if appropriate and nasal secretions for changes in amount and color  - [place] appropriate cooling/warming therapies per order  - Administer medications as ordered  - Instruct and encourage patient and family to use good hand hygiene technique  - Identify and instruct in appropriate isolation precautions for identified infection/condition  8/9/2024 1028 by Alicia Lynn RN  Outcome: Adequate for Discharge  8/9/2024 0747 by Alicia Lynn RN  Outcome: Progressing     Problem: SAFETY ADULT  Goal: Patient will remain free of falls  Description: INTERVENTIONS:  - Educate patient/family on patient safety including physical limitations  - Instruct patient to call for assistance with activity   - Consult OT/PT to assist with strengthening/mobility   - Keep Call bell within reach  - Keep bed low and locked with side rails adjusted as appropriate  - Keep care items and personal belongings within reach  - Initiate  and maintain comfort rounds  - Make Fall Risk Sign visible to staff  - Offer Toileting every 2 Hours, in advance of need  - Initiate/Maintain bed alarm  - Apply yellow socks and bracelet for high fall risk patients  - Consider moving patient to room near nurses station  8/9/2024 1028 by Alicia Lynn RN  Outcome: Adequate for Discharge  8/9/2024 0747 by Alicia Lynn RN  Outcome: Progressing  Goal: Maintain or return to baseline ADL function  Description: INTERVENTIONS:  -  Assess patient's ability to carry out ADLs; assess patient's baseline for ADL function and identify physical deficits which impact ability to perform ADLs (bathing, care of mouth/teeth, toileting, grooming, dressing, etc.)  - Assess/evaluate cause of self-care deficits   - Assess range of motion  - Assess patient's mobility; develop plan if impaired  - Assess patient's need for assistive devices and provide as appropriate  - Encourage maximum independence but intervene and supervise when necessary  - Involve family in performance of ADLs  - Assess for home care needs following discharge   - Consider OT consult to assist with ADL evaluation and planning for discharge  - Provide patient education as appropriate  8/9/2024 1028 by Alicia Lynn RN  Outcome: Adequate for Discharge  8/9/2024 0747 by Alicia Lynn RN  Outcome: Progressing  Goal: Maintains/Returns to pre admission functional level  Description: INTERVENTIONS:  - Perform AM-PAC 6 Click Basic Mobility/ Daily Activity assessment daily.  - Set and communicate daily mobility goal to care team and patient/family/caregiver.   - Collaborate with rehabilitation services on mobility goals if consulted  - Stand patient 3 times a day  - Ambulate patient 3 times a day  - Out of bed to chair 3 times a day   - Out of bed for meals 3 times a day  - Out of bed for toileting  - Record patient progress and toleration of activity level   8/9/2024 1028 by Alicia Lynn  RN  Outcome: Adequate for Discharge  8/9/2024 0747 by Alicia Lynn RN  Outcome: Progressing     Problem: DISCHARGE PLANNING  Goal: Discharge to home or other facility with appropriate resources  Description: INTERVENTIONS:  - Identify barriers to discharge w/patient and caregiver  - Arrange for needed discharge resources and transportation as appropriate  - Identify discharge learning needs (meds, wound care, etc.)  - Arrange for interpretive services to assist at discharge as needed  - Refer to Case Management Department for coordinating discharge planning if the patient needs post-hospital services based on physician/advanced practitioner order or complex needs related to functional status, cognitive ability, or social support system  8/9/2024 1028 by Alicia Lynn RN  Outcome: Adequate for Discharge  8/9/2024 0747 by Alicia Lynn RN  Outcome: Progressing     Problem: Knowledge Deficit  Goal: Patient/family/caregiver demonstrates understanding of disease process, treatment plan, medications, and discharge instructions  Description: Complete learning assessment and assess knowledge base.  Interventions:  - Provide teaching at level of understanding  - Provide teaching via preferred learning methods  8/9/2024 1028 by Alicia Lynn RN  Outcome: Adequate for Discharge  8/9/2024 0747 by Alicia Lynn RN  Outcome: Progressing     Problem: CARDIOVASCULAR - ADULT  Goal: Maintains optimal cardiac output and hemodynamic stability  Description: INTERVENTIONS:  - Monitor I/O, vital signs and rhythm  - Monitor for S/S and trends of decreased cardiac output  - Administer and titrate ordered vasoactive medications to optimize hemodynamic stability  - Assess quality of pulses, skin color and temperature  - Assess for signs of decreased coronary artery perfusion  - Instruct patient to report change in severity of symptoms  8/9/2024 1028 by Alicia Lynn RN  Outcome: Adequate for  Discharge  8/9/2024 0747 by Alicia yLnn RN  Outcome: Progressing     Problem: METABOLIC, FLUID AND ELECTROLYTES - ADULT  Goal: Electrolytes maintained within normal limits  Description: INTERVENTIONS:  - Monitor labs and assess patient for signs and symptoms of electrolyte imbalances  - Administer electrolyte replacement as ordered  - Monitor response to electrolyte replacements, including repeat lab results as appropriate  - Instruct patient on fluid and nutrition as appropriate  8/9/2024 1028 by Alicia Lynn RN  Outcome: Adequate for Discharge  8/9/2024 0747 by Alicia Lynn RN  Outcome: Progressing  Goal: Fluid balance maintained  Description: INTERVENTIONS:  - Monitor labs   - Monitor I/O and WT  - Instruct patient on fluid and nutrition as appropriate  - Assess for signs & symptoms of volume excess or deficit  8/9/2024 1028 by Alicia Lynn RN  Outcome: Adequate for Discharge  8/9/2024 0747 by Alicia Lynn RN  Outcome: Progressing  Goal: Glucose maintained within target range  Description: INTERVENTIONS:  - Monitor Blood Glucose as ordered  - Assess for signs and symptoms of hyperglycemia and hypoglycemia  - Administer ordered medications to maintain glucose within target range  - Assess nutritional intake and initiate nutrition service referral as needed  8/9/2024 1028 by Alicia Lynn RN  Outcome: Adequate for Discharge  8/9/2024 0747 by Alicia Lynn RN  Outcome: Progressing     Problem: HEMATOLOGIC - ADULT  Goal: Maintains hematologic stability  Description: INTERVENTIONS  - Assess for signs and symptoms of bleeding or hemorrhage  - Monitor labs  - Administer supportive blood products/factors as ordered and appropriate  8/9/2024 1028 by Alicia Lynn RN  Outcome: Adequate for Discharge  8/9/2024 0747 by Alicia Lynn RN  Outcome: Progressing     Problem: MUSCULOSKELETAL - ADULT  Goal: Maintain or return mobility to safest level of  function  Description: INTERVENTIONS:  - Assess patient's ability to carry out ADLs; assess patient's baseline for ADL function and identify physical deficits which impact ability to perform ADLs (bathing, care of mouth/teeth, toileting, grooming, dressing, etc.)  - Assess/evaluate cause of self-care deficits   - Assess range of motion  - Assess patient's mobility  - Assess patient's need for assistive devices and provide as appropriate  - Encourage maximum independence but intervene and supervise when necessary  - Involve family in performance of ADLs  - Assess for home care needs following discharge   - Consider OT consult to assist with ADL evaluation and planning for discharge  - Provide patient education as appropriate  8/9/2024 1028 by Alicia Lynn RN  Outcome: Adequate for Discharge  8/9/2024 0747 by Alicia Lynn RN  Outcome: Progressing  Goal: Maintain proper alignment of affected body part  Description: INTERVENTIONS:  - Support, maintain and protect limb and body alignment  - Provide patient/ family with appropriate education  8/9/2024 1028 by Alicia Lynn RN  Outcome: Adequate for Discharge  8/9/2024 0747 by Alicia Lynn RN  Outcome: Progressing     Problem: Prexisting or High Potential for Compromised Skin Integrity  Goal: Skin integrity is maintained or improved  Description: INTERVENTIONS:  - Identify patients at risk for skin breakdown  - Assess and monitor skin integrity  - Assess and monitor nutrition and hydration status  - Monitor labs   - Assess for incontinence   - Turn and reposition patient  - Assist with mobility/ambulation  - Relieve pressure over bony prominences  - Avoid friction and shearing  - Provide appropriate hygiene as needed including keeping skin clean and dry  - Evaluate need for skin moisturizer/barrier cream  - Collaborate with interdisciplinary team   - Patient/family teaching  - Consider wound care consult   8/9/2024 1028 by Alicia Lynn  RN  Outcome: Adequate for Discharge  8/9/2024 0747 by Alicia Lynn RN  Outcome: Progressing     Problem: Nutrition/Hydration-ADULT  Goal: Nutrient/Hydration intake appropriate for improving, restoring or maintaining nutritional needs  Description: Monitor and assess patient's nutrition/hydration status for malnutrition. Collaborate with interdisciplinary team and initiate plan and interventions as ordered.  Monitor patient's weight and dietary intake as ordered or per policy. Utilize nutrition screening tool and intervene as necessary. Determine patient's food preferences and provide high-protein, high-caloric foods as appropriate.     INTERVENTIONS:  - Monitor oral intake, urinary output, labs, and treatment plans  - Assess nutrition and hydration status and recommend course of action  - Evaluate amount of meals eaten  - Assist patient with eating if necessary   - Allow adequate time for meals  - Recommend/ encourage appropriate diets, oral nutritional supplements, and vitamin/mineral supplements  - Order, calculate, and assess calorie counts as needed  - Recommend, monitor, and adjust tube feedings and TPN/PPN based on assessed needs  - Assess need for intravenous fluids  - Provide specific nutrition/hydration education as appropriate  - Include patient/family/caregiver in decisions related to nutrition  8/9/2024 1028 by Alicia Lynn RN  Outcome: Adequate for Discharge  8/9/2024 0747 by Alicia Lynn RN  Outcome: Progressing     Problem: NEUROSENSORY - ADULT  Goal: Achieves maximal functionality and self care  Description: INTERVENTIONS  - Monitor swallowing and airway patency with patient fatigue and changes in neurological status  - Encourage and assist patient to increase activity and self care.   - Encourage visually impaired, hearing impaired and aphasic patients to use assistive/communication devices  8/9/2024 1028 by Alicia Lynn RN  Outcome: Adequate for Discharge  8/9/2024 0747  by Alicia Lynn RN  Outcome: Progressing

## 2024-08-09 NOTE — ASSESSMENT & PLAN NOTE
78 yo female ex-smoker w/ a hx of obesity, CKD IV, DM II w/ peripheral neuropathy, HLD, HTN, CAD, L MCA/PCA CVA S/P L TCAR 9/7/23 ([facility]), FÉLIX, mesenteric artery stenosis and aortoiliac occlusive disease w/ PAD and chronic R hallux dry gangrene presented to [facility] on 7/16/24 w/ worsening R great toe pain and R hallux gangrene. Pt was transferred to [facility] on 7/19 w/ plans for R femoral endarterectomy w/ retrograde stenting and RLE bypass.     - s/p B/L CFAE, L JACI/EIA balloon angio and shockwave lithotripsy w/ insertion of a L EIA drug-coated stent and L JACI VBX stent, L to R PTFE fem-fem bypass, and B/L groin vac placement on 7/24.   - s/p right hallux amputation 7/29/24  - s/p 3U PRBC and 1U FFP intra-op and 1U PRBC on 7/30 and 7/31.    Plan was to return to the OR for right femoral to BK-pop bypass.  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       Imaging:  -7/30/24 CT head: Chronic left PCA territory infarct. No evidence of acute infarct, intracranial hemorrhage or mass.  -7/29/24 LEAD: Right: Evidence of patent endarterectomy with diffuse disease noted throughout the femoral-popliteal arteries without significant focal stenosis. LISA: 0.53/48/22. Left: Evidence of patent endarterectomy with diffuse disease noted throughout the femoral-popliteal arteries without significant focal stenosis. L LISA: 0.57/10/43  -7/29/24 Xray R foot: Increasing volume of air within the soft tissues of the first toe in this patient with provided history of gangrene. The previously described cortical destruction along the ventral aspect of the first distal phalanx is not well visualized on the current examination, likely attributed to  differences in patient positioning.  -7/22/24 Xray R foot: Cortical destruction along the ventral aspect of the first distal phalanx worrisome for acute osteomyelitis. Large air within the overlying soft tissues.   -7/21/24 Echo: Left ventricular wall thickness is severely increased. The left ventricular ejection fraction is 41% by biplane measurement. Systolic function is mildly reduced. Global longitudinal strain is reduced at -10% with moderate to severe strain reduction in the ED mid to basal anteroseptal septal and inferior walls.. Unable to grade diastolic dysfunction given the severe degree of mitral annular calcification.   -7/18/24 CTA abd w/ runoff: Visualized descending thoracic and suprarenal abdominal aorta demonstrate marked soft plaque with multifocal regions of plaque ulceration. A point of relative stenosis within the infrarenal abdominal aorta measures up to 7 mm in diameter. Bilateral multifocal severe stenosis within the external iliac and common femoral arteries. Bilateral long segment SFA occlusion extending from the ostia to the level of the P1 popliteal artery. Three-vessel runoff on the right, the posterior tibial artery is dominant. Two-vessel runoff on the left, the peroneal artery is dominant. Segmental visualization of the posterior tibial artery. Interval increase in size of an exophytic lesion arising medially from the stomach, again consistent with gastrointestinal stromal tumor. New dilation of the pancreatic duct within the pancreatic head, no obvious pancreatic/ampulla lesion identified.  -7/16/24 LEAD: Right: Severe diffuse disease noted throughout the femoral-popliteal arteries with high grade stenosis vs occlusion of the proximal-distal SFA with reconstitution to the popliteal artery and high grade stenosis vs occlusion of the distal YOVANY. R LISA: 0.11/-/-. Left: Severe diffuse disease noted throughout the femoral-popliteal arteries with high grade stenosis vs occlusion of the  proximal-mid SFA with reconstitution in the distal SFA and high grade stenosis vs occlusion of the entire PTA. L LISA: 0.31/20/19.     Labs: 8/9  - WBC 17.89 (22.92)  - Hgb  8.0 (8.3)  - sCr 1.77/ eGFR 27  - K+ 3.1    Plan:   -Advanced calcific atherosclerotic AIOD/PAD w/ CTLI and R hallux dry gangrene (+) OM now s/p R hallux amputation w/ partial 1st ray resection by podiatry on 7/29  -S/P B/L CFAE, L JACI/EIA balloon angio and shockwave lithotripsy w/ insertion of a L EIA drug-coated stent and L JACI VBX stent, L to R PTFE fem-fem bypass, and B/L groin vac placement on 7/24 (POD #15)  -B/L groin wound vacs intact w/ adequate suction; serous drainage, continue wound vac changes qMWF. Dressing taken down today, all black foam removed. Placed wet to dry dressing at b/l groin sites for transport to nursing facility.   -No plan for additional vascular intervention at this time. Plan to discharge to nursing facility with open toe amp and b/l groin VACs. Follow up with vascular in 2 weeks to determine next steps of right fem pop bypass   -ID following for ABX management, input appreciated; surgical cultures growing Morganella morganii  -Cardiology following   -Continue to monitor Hgb and transfuse < 7.0 per primary team. +melena, GI re-engaged. Endoscopy cancelled d/t high risk recommended PPI.   -Continue brilinta and Eliquis (per cards) and pravastatin for medical management  -GI sign off, LFTs down trending. No indication for further imaging or intervention. Re-engaged, see above  -Geriatrics input appreciated  -Continue medical management per primary team  -PT/OT follownig  -D/W Dr. Reed

## 2024-08-09 NOTE — PLAN OF CARE
Problem: PAIN - ADULT  Goal: Verbalizes/displays adequate comfort level or baseline comfort level  Description: Interventions:  - Encourage patient to monitor pain and request assistance  - Assess pain using appropriate pain scale  - Administer analgesics based on type and severity of pain and evaluate response  - Implement non-pharmacological measures as appropriate and evaluate response  - Consider cultural and social influences on pain and pain management  - Notify physician/advanced practitioner if interventions unsuccessful or patient reports new pain  Outcome: Progressing     Problem: INFECTION - ADULT  Goal: Absence or prevention of progression during hospitalization  Description: INTERVENTIONS:  - Assess and monitor for signs and symptoms of infection  - Monitor lab/diagnostic results  - Monitor all insertion sites, i.e. indwelling lines, tubes, and drains  - Monitor endotracheal if appropriate and nasal secretions for changes in amount and color  - [place] appropriate cooling/warming therapies per order  - Administer medications as ordered  - Instruct and encourage patient and family to use good hand hygiene technique  - Identify and instruct in appropriate isolation precautions for identified infection/condition  Outcome: Progressing     Problem: SAFETY ADULT  Goal: Patient will remain free of falls  Description: INTERVENTIONS:  - Educate patient/family on patient safety including physical limitations  - Instruct patient to call for assistance with activity   - Consult OT/PT to assist with strengthening/mobility   - Keep Call bell within reach  - Keep bed low and locked with side rails adjusted as appropriate  - Keep care items and personal belongings within reach  - Initiate and maintain comfort rounds  - Make Fall Risk Sign visible to staff  - Offer Toileting every 2 Hours, in advance of need  - Initiate/Maintain bed alarm  - Apply yellow socks and bracelet for high fall risk patients  - Consider  moving patient to room near nurses station  Outcome: Progressing  Goal: Maintain or return to baseline ADL function  Description: INTERVENTIONS:  -  Assess patient's ability to carry out ADLs; assess patient's baseline for ADL function and identify physical deficits which impact ability to perform ADLs (bathing, care of mouth/teeth, toileting, grooming, dressing, etc.)  - Assess/evaluate cause of self-care deficits   - Assess range of motion  - Assess patient's mobility; develop plan if impaired  - Assess patient's need for assistive devices and provide as appropriate  - Encourage maximum independence but intervene and supervise when necessary  - Involve family in performance of ADLs  - Assess for home care needs following discharge   - Consider OT consult to assist with ADL evaluation and planning for discharge  - Provide patient education as appropriate  Outcome: Progressing  Goal: Maintains/Returns to pre admission functional level  Description: INTERVENTIONS:  - Perform AM-PAC 6 Click Basic Mobility/ Daily Activity assessment daily.  - Set and communicate daily mobility goal to care team and patient/family/caregiver.   - Collaborate with rehabilitation services on mobility goals if consulted  - Stand patient 3 times a day  - Ambulate patient 3 times a day  - Out of bed to chair 3 times a day   - Out of bed for meals 3 times a day  - Out of bed for toileting  - Record patient progress and toleration of activity level   Outcome: Progressing     Problem: DISCHARGE PLANNING  Goal: Discharge to home or other facility with appropriate resources  Description: INTERVENTIONS:  - Identify barriers to discharge w/patient and caregiver  - Arrange for needed discharge resources and transportation as appropriate  - Identify discharge learning needs (meds, wound care, etc.)  - Arrange for interpretive services to assist at discharge as needed  - Refer to Case Management Department for coordinating discharge planning if the  patient needs post-hospital services based on physician/advanced practitioner order or complex needs related to functional status, cognitive ability, or social support system  Outcome: Progressing     Problem: Knowledge Deficit  Goal: Patient/family/caregiver demonstrates understanding of disease process, treatment plan, medications, and discharge instructions  Description: Complete learning assessment and assess knowledge base.  Interventions:  - Provide teaching at level of understanding  - Provide teaching via preferred learning methods  Outcome: Progressing     Problem: CARDIOVASCULAR - ADULT  Goal: Maintains optimal cardiac output and hemodynamic stability  Description: INTERVENTIONS:  - Monitor I/O, vital signs and rhythm  - Monitor for S/S and trends of decreased cardiac output  - Administer and titrate ordered vasoactive medications to optimize hemodynamic stability  - Assess quality of pulses, skin color and temperature  - Assess for signs of decreased coronary artery perfusion  - Instruct patient to report change in severity of symptoms  Outcome: Progressing     Problem: METABOLIC, FLUID AND ELECTROLYTES - ADULT  Goal: Electrolytes maintained within normal limits  Description: INTERVENTIONS:  - Monitor labs and assess patient for signs and symptoms of electrolyte imbalances  - Administer electrolyte replacement as ordered  - Monitor response to electrolyte replacements, including repeat lab results as appropriate  - Instruct patient on fluid and nutrition as appropriate  Outcome: Progressing  Goal: Fluid balance maintained  Description: INTERVENTIONS:  - Monitor labs   - Monitor I/O and WT  - Instruct patient on fluid and nutrition as appropriate  - Assess for signs & symptoms of volume excess or deficit  Outcome: Progressing  Goal: Glucose maintained within target range  Description: INTERVENTIONS:  - Monitor Blood Glucose as ordered  - Assess for signs and symptoms of hyperglycemia and hypoglycemia  -  Administer ordered medications to maintain glucose within target range  - Assess nutritional intake and initiate nutrition service referral as needed  Outcome: Progressing     Problem: HEMATOLOGIC - ADULT  Goal: Maintains hematologic stability  Description: INTERVENTIONS  - Assess for signs and symptoms of bleeding or hemorrhage  - Monitor labs  - Administer supportive blood products/factors as ordered and appropriate  Outcome: Progressing     Problem: MUSCULOSKELETAL - ADULT  Goal: Maintain or return mobility to safest level of function  Description: INTERVENTIONS:  - Assess patient's ability to carry out ADLs; assess patient's baseline for ADL function and identify physical deficits which impact ability to perform ADLs (bathing, care of mouth/teeth, toileting, grooming, dressing, etc.)  - Assess/evaluate cause of self-care deficits   - Assess range of motion  - Assess patient's mobility  - Assess patient's need for assistive devices and provide as appropriate  - Encourage maximum independence but intervene and supervise when necessary  - Involve family in performance of ADLs  - Assess for home care needs following discharge   - Consider OT consult to assist with ADL evaluation and planning for discharge  - Provide patient education as appropriate  Outcome: Progressing  Goal: Maintain proper alignment of affected body part  Description: INTERVENTIONS:  - Support, maintain and protect limb and body alignment  - Provide patient/ family with appropriate education  Outcome: Progressing     Problem: Prexisting or High Potential for Compromised Skin Integrity  Goal: Skin integrity is maintained or improved  Description: INTERVENTIONS:  - Identify patients at risk for skin breakdown  - Assess and monitor skin integrity  - Assess and monitor nutrition and hydration status  - Monitor labs   - Assess for incontinence   - Turn and reposition patient  - Assist with mobility/ambulation  - Relieve pressure over bony  prominences  - Avoid friction and shearing  - Provide appropriate hygiene as needed including keeping skin clean and dry  - Evaluate need for skin moisturizer/barrier cream  - Collaborate with interdisciplinary team   - Patient/family teaching  - Consider wound care consult   Outcome: Progressing     Problem: Nutrition/Hydration-ADULT  Goal: Nutrient/Hydration intake appropriate for improving, restoring or maintaining nutritional needs  Description: Monitor and assess patient's nutrition/hydration status for malnutrition. Collaborate with interdisciplinary team and initiate plan and interventions as ordered.  Monitor patient's weight and dietary intake as ordered or per policy. Utilize nutrition screening tool and intervene as necessary. Determine patient's food preferences and provide high-protein, high-caloric foods as appropriate.     INTERVENTIONS:  - Monitor oral intake, urinary output, labs, and treatment plans  - Assess nutrition and hydration status and recommend course of action  - Evaluate amount of meals eaten  - Assist patient with eating if necessary   - Allow adequate time for meals  - Recommend/ encourage appropriate diets, oral nutritional supplements, and vitamin/mineral supplements  - Order, calculate, and assess calorie counts as needed  - Recommend, monitor, and adjust tube feedings and TPN/PPN based on assessed needs  - Assess need for intravenous fluids  - Provide specific nutrition/hydration education as appropriate  - Include patient/family/caregiver in decisions related to nutrition  Outcome: Progressing     Problem: NEUROSENSORY - ADULT  Goal: Achieves maximal functionality and self care  Description: INTERVENTIONS  - Monitor swallowing and airway patency with patient fatigue and changes in neurological status  - Encourage and assist patient to increase activity and self care.   - Encourage visually impaired, hearing impaired and aphasic patients to use assistive/communication  devices  Outcome: Progressing

## 2024-08-09 NOTE — ASSESSMENT & PLAN NOTE
History of PAD CAD diabetes and hypertension presented to [facility] for right great toe gangrene transferred here for vascular bypass.  This admission: Status post bilateral femoral endarterectomy with bypass and JACI/EIA stenting  VAC still present.  Continue aspirin and Brilinta.  Gangrene of toe/cellulitis.  Status post first ray amputation.  Completed course of ceftriaxone per ID.  No further vascular/podiatry interventions anticipated.  Disposition: Discharging to rehab today.  Follow-up with vascular and podiatry postdischarge

## 2024-08-09 NOTE — ASSESSMENT & PLAN NOTE
Acute kidney injury creatinine 2.37 secondary to sepsis.    Resolved.    Results from last 7 days   Lab Units 08/09/24  0554 08/08/24  0516 08/07/24  0543 08/04/24  0434   BUN mg/dL 72* 76* 67* 73*   CREATININE mg/dL 1.77* 1.84* 1.58* 1.52*   EGFR ml/min/1.73sq m 27 26 31 32

## 2024-08-09 NOTE — ASSESSMENT & PLAN NOTE
CAD with history of PCI.  No chest pain.    Aspirin discontinued as patient started on eliquis for atrial fibrillation  Continue brilinta.

## 2024-08-09 NOTE — ASSESSMENT & PLAN NOTE
Acute blood loss anemia from procedures.  Has received 5 units of PRBC since admission.  Downtrending hemoglobin with melanotic stools.  Discussed with GI.  Remains high risk for endoscopy.    Possibly peptic ulcer disease and will remain on pantoprazole twice daily    Results from last 7 days   Lab Units 08/09/24  0554 08/08/24  0516 08/07/24  0543 08/04/24  0434   HEMOGLOBIN g/dL 8.0* 8.3* 8.7* 10.1*

## 2024-08-09 NOTE — ASSESSMENT & PLAN NOTE
Shock liver related to anemia/hypotension.  LFTs normalized    Results from last 7 days   Lab Units 08/09/24  0554 08/08/24  0516 08/07/24  0543 08/04/24  0434   AST U/L 16 19 24 52*   ALT U/L 18 25 30 103*   TOTAL BILIRUBIN mg/dL 1.56* 1.19* 1.11* 0.96

## 2024-08-09 NOTE — DISCHARGE SUMMARY
[facility]  Discharge- Anamaria Parnell 1945, 79 y.o. female MRN: [id number]  Unit/Bed#: [place] [address] -01 Encounter: [id number]  Primary Care Provider: Ritchie Lawtno MD   Date and time admitted to hospital: 7/19/2024  4:34 PM    Discharge Diagnoses:  * Aortoiliac occlusive disease (HCC)  Assessment & Plan  History of PAD CAD diabetes and hypertension presented to [facility] for right great toe gangrene transferred here for vascular bypass.  This admission: Status post bilateral femoral endarterectomy with bypass and JACI/EIA stenting  VAC still present.  Continue aspirin and Brilinta.  Gangrene of toe/cellulitis.  Status post first ray amputation.  Completed course of ceftriaxone per ID.  No further vascular/podiatry interventions anticipated.  Disposition: Discharging to rehab today.  Follow-up with vascular and podiatry postdischarge    Acute blood loss anemia  Assessment & Plan  Acute blood loss anemia from procedures.  Has received 5 units of PRBC since admission.  Downtrending hemoglobin with melanotic stools.  Discussed with GI.  Remains high risk for endoscopy.    Possibly peptic ulcer disease and will remain on pantoprazole twice daily    Results from last 7 days   Lab Units 08/09/24  0554 08/08/24  0516 08/07/24  0543 08/04/24  0434   HEMOGLOBIN g/dL 8.0* 8.3* 8.7* 10.1*       Acute liver failure without hepatic coma  Assessment & Plan  Shock liver related to anemia/hypotension.  LFTs normalized    Results from last 7 days   Lab Units 08/09/24  0554 08/08/24  0516 08/07/24  0543 08/04/24  0434   AST U/L 16 19 24 52*   ALT U/L 18 25 30 103*   TOTAL BILIRUBIN mg/dL 1.56* 1.19* 1.11* 0.96       Chronic HFrEF, LVEF 30%, LVIDd 4.6 cm, AHA Stage C  Assessment & Plan  Compensated.  Continue torsemide    Paroxysmal atrial fibrillation (HCC)  Assessment & Plan  New onset atrial fibrillation this admission multifactorial from surgery/anemia.  Did have bradycardia thought  secondary to high-dose metoprolol.  No further episodes.  No need for pacemaker.  Continue metoprolol 25 mg daily.  Anticoagulation: Cardiology started Eliquis.    Coronary artery disease of native artery of native heart with stable angina pectoris (HCC)  Assessment & Plan  CAD with history of PCI.  No chest pain.    Aspirin discontinued as patient started on eliquis for atrial fibrillation  Continue brilinta.    Primary hypertension  Assessment & Plan  Blood pressures are stable.  Off amlodipine and clonidine.  Labetalol changed to metoprolol by cardiology    Type 2 diabetes mellitus, without long-term current use of insulin ([facility])  Assessment & Plan  Lab Results   Component Value Date    HGBA1C 5.9 (H) 07/09/2024     Recent Labs     08/08/24  1607 08/08/24  2106 08/09/24  0738 08/09/24  1103   POCGLU 156* 213* 172* 161*     Stable continue levemir 10 units     Depression, recurrent ([facility])  Assessment & Plan  Mood stable continue escitalopram    Acute renal failure (ARF) ([facility])  Assessment & Plan  Acute kidney injury creatinine 2.37 secondary to sepsis.    Resolved.    Results from last 7 days   Lab Units 08/09/24  0554 08/08/24  0516 08/07/24  0543 08/04/24  0434   BUN mg/dL 72* 76* 67* 73*   CREATININE mg/dL 1.77* 1.84* 1.58* 1.52*   EGFR ml/min/1.73sq m 27 26 31 32         Medical Problems       Resolved Problems  Date Reviewed: 8/9/2024   None        Discharging Physician / Practitioner: Akira Flores DO  PCP: Ritchie Lawton MD  Admission Date:   Admission Orders (From admission, onward)       Ordered        07/19/24 1636  INPATIENT ADMISSION  Once                        Discharge Date: 08/09/24    Consultations During Hospital Stay:  IP CONSULT TO CARDIOLOGY  IP CONSULT TO CARDIOLOGY  IP CONSULT TO PODIATRY  IP CONSULT TO MEDICAL CRITICAL CARE  IP CONSULT TO INFECTIOUS DISEASES  IP CONSULT TO GERONTOLOGY  IP CONSULT TO GASTROENTEROLOGY  IP CONSULT TO GASTROENTEROLOGY     Procedures Performed:   Procedure(s)  (LRB):  Rigth Hallux amputation, partial 1st ray resection (Right)     Images:   XR chest portable ICU    Result Date: 7/24/2024  Impression: ET tube 4 cm above the roberta. Minimal atelectasis in the right midlung. Workstation performed: [id number]     XR chest portable    Result Date: 7/23/2024  Impression: Interval development of pulmonary congestion. Small bilateral effusions suspected. Workstation performed: [id number]     XR foot 3+ vw right    Result Date: 7/22/2024  Impression: Cortical destruction along the ventral aspect of the first distal phalanx worrisome for acute osteomyelitis. Large air within the overlying soft tissues. The study was marked in EPIC for immediate notification. Workstation performed: [id number]       Lab Results:   Results from last 7 days   Lab Units 08/09/24  0554 08/08/24  0516 08/07/24  0543 08/04/24  0434 08/03/24  2209   WBC Thousand/uL 17.89* 22.92* 25.59* 16.63*  --    HEMOGLOBIN g/dL 8.0* 8.3* 8.7* 10.1* 10.0*   HEMATOCRIT % 27.3* 27.3* 28.4* 31.0* 31.9*   MCV fL 100* 103* 101* 97  --    PLATELETS Thousands/uL 265 322 388 283  --      Results from last 7 days   Lab Units 08/09/24  0554 08/08/24  0516 08/07/24  0543 08/04/24 2012 08/04/24  0434   SODIUM mmol/L 145 144 141  --  141   POTASSIUM mmol/L 3.1* 3.3* 4.0 4.4 2.9*   CHLORIDE mmol/L 103 101 100  --  99   CO2 mmol/L 33* 31 27  --  32   BUN mg/dL 72* 76* 67*  --  73*   CREATININE mg/dL 1.77* 1.84* 1.58*  --  1.52*   CALCIUM mg/dL 8.3* 8.5 8.7  --  8.4   ALBUMIN g/dL 2.7* 2.8* 2.9*  --  3.0*   TOTAL BILIRUBIN mg/dL 1.56* 1.19* 1.11*  --  0.96   ALK PHOS U/L 80 87 77  --  120*   ALT U/L 18 25 30  --  103*   AST U/L 16 19 24  --  52*   EGFR ml/min/1.73sq m 27 26 31  --  32   GLUCOSE RANDOM mg/dL 163* 165* 117  --  135                      Results from last 7 days   Lab Units 08/09/24  1103 08/09/24  0738 08/08/24  2106 08/08/24  1607 08/08/24  1103 08/08/24  0715 08/07/24  2100 08/07/24  1553 08/07/24  1120  "08/07/24  0807   POC GLUCOSE mg/dl 161* 172* 213* 156* 165* 189* 179* 195* 210* 132       Incidental Findings:     Test Results Pending at Discharge (will require follow up):      Reason for Admission:   Transferred here for vascular surgery    Hospital Course:   Anamaria Parnell is a 79 y.o. female patient who originally presented to [facility] 7/16/2024 for sepsis due to toe ulcer/gangrene.  The patient was transferred here where she had a prolonged hospitalization requiring multidisciplinary consultation.  Patient eventually did undergo bilateral CFA E with balloon angioplasty and left lower extremity stenting.  Unfortunately all revascularization could not be achieved.  The patient did require podiatric amputation.  Towards the latter part of hospitalization he had anemia due to melena but was very high risk for endoscopy so she was treated medically.  She remains debilitated and is going to rehab.  She will need very close follow-up with vascular surgery, podiatry, cardiology, and GI.    Please see above list of diagnoses and related plan for additional information.     Condition at Discharge: stable     Discharge Day Visit / Exam:   Subjective: Patient seen and examined, no abdominal pain.    Vitals: Blood Pressure: 136/84 (08/09/24 1029)  Pulse: 78 (08/09/24 0735)  Temperature: (!) 97.4 °F (36.3 °C) (08/09/24 0735)  Temp Source: Oral (08/08/24 2108)  Respirations: 16 (08/08/24 2108)  Height: 4' 10\" (147.3 cm) (07/31/24 1623)  Weight - Scale: 68.1 kg (150 lb 2.1 oz) (08/09/24 0600)  SpO2: 99 % (08/09/24 0735)    Exam:   Physical Exam  Vitals reviewed.   Constitutional:       General: She is not in acute distress.     Appearance: Normal appearance.   HENT:      Head: Atraumatic.   Eyes:      Extraocular Movements: Extraocular movements intact.   Cardiovascular:      Rate and Rhythm: Regular rhythm.      Heart sounds: Normal heart sounds.   Pulmonary:      Breath sounds: Decreased breath sounds present. No " wheezing.   Abdominal:      General: Bowel sounds are normal.      Palpations: Abdomen is soft.      Tenderness: There is no guarding.   Musculoskeletal:         General: Tenderness and deformity present.      Comments: VAC bilateral groins   Skin:     General: Skin is warm.   Neurological:      Mental Status: She is alert.   Psychiatric:         Mood and Affect: Mood normal.       Discussion with Family: Son on telephone    Discharge instructions/Information to patient and family:   See after visit summary for information provided to patient and family.      Provisions for Follow-Up Care:  See after visit summary for information related to follow-up care and any pertinent home health orders.      Mobility at time of Discharge:  Basic Mobility Inpatient Raw Score: 6  JH-HLM Goal: 2: Bed activities/Dependent transfer  JH-HLM Achieved: 2: Bed activities/Dependent transfer  JH-HLM Goal achieved. Continue to encourage appropriate mobility.    Disposition:   [facility] (see below)    Planned Readmission: No     Discharge Statement:  I spent 35 minutes discharging the patient. This time was spent on the day of discharge. I had direct contact with the patient on the day of discharge. Greater than 50% of the total time was spent examining patient, answering all patient questions, arranging and discussing plan of care with patient as well as directly providing post-discharge instructions.  Additional time then spent on discharge activities.    Discharge Medications:  See after visit summary for reconciled discharge medications provided to patient and family.      ** Please Note: This note has been constructed using a voice recognition system **

## 2024-08-09 NOTE — PLAN OF CARE
Problem: PAIN - ADULT  Goal: Verbalizes/displays adequate comfort level or baseline comfort level  Description: Interventions:  - Encourage patient to monitor pain and request assistance  - Assess pain using appropriate pain scale  - Administer analgesics based on type and severity of pain and evaluate response  - Implement non-pharmacological measures as appropriate and evaluate response  - Consider cultural and social influences on pain and pain management  - Notify physician/advanced practitioner if interventions unsuccessful or patient reports new pain  Outcome: Progressing     Problem: INFECTION - ADULT  Goal: Absence or prevention of progression during hospitalization  Description: INTERVENTIONS:  - Assess and monitor for signs and symptoms of infection  - Monitor lab/diagnostic results  - Monitor all insertion sites, i.e. indwelling lines, tubes, and drains  - Monitor endotracheal if appropriate and nasal secretions for changes in amount and color  - [place] appropriate cooling/warming therapies per order  - Administer medications as ordered  - Instruct and encourage patient and family to use good hand hygiene technique  - Identify and instruct in appropriate isolation precautions for identified infection/condition  Outcome: Progressing     Problem: SAFETY ADULT  Goal: Patient will remain free of falls  Description: INTERVENTIONS:  - Educate patient/family on patient safety including physical limitations  - Instruct patient to call for assistance with activity   - Consult OT/PT to assist with strengthening/mobility   - Keep Call bell within reach  - Keep bed low and locked with side rails adjusted as appropriate  - Keep care items and personal belongings within reach  - Initiate and maintain comfort rounds  - Make Fall Risk Sign visible to staff  - Offer Toileting every 2 Hours, in advance of need  - Initiate/Maintain bed alarm  - Apply yellow socks and bracelet for high fall risk patients  - Consider  moving patient to room near nurses station  Outcome: Progressing  Goal: Maintain or return to baseline ADL function  Description: INTERVENTIONS:  -  Assess patient's ability to carry out ADLs; assess patient's baseline for ADL function and identify physical deficits which impact ability to perform ADLs (bathing, care of mouth/teeth, toileting, grooming, dressing, etc.)  - Assess/evaluate cause of self-care deficits   - Assess range of motion  - Assess patient's mobility; develop plan if impaired  - Assess patient's need for assistive devices and provide as appropriate  - Encourage maximum independence but intervene and supervise when necessary  - Involve family in performance of ADLs  - Assess for home care needs following discharge   - Consider OT consult to assist with ADL evaluation and planning for discharge  - Provide patient education as appropriate  Outcome: Progressing  Goal: Maintains/Returns to pre admission functional level  Description: INTERVENTIONS:  - Perform AM-PAC 6 Click Basic Mobility/ Daily Activity assessment daily.  - Set and communicate daily mobility goal to care team and patient/family/caregiver.   - Collaborate with rehabilitation services on mobility goals if consulted  - Stand patient 3 times a day  - Ambulate patient 3 times a day  - Out of bed to chair 3 times a day   - Out of bed for meals 3 times a day  - Out of bed for toileting  - Record patient progress and toleration of activity level   Outcome: Progressing     Problem: DISCHARGE PLANNING  Goal: Discharge to home or other facility with appropriate resources  Description: INTERVENTIONS:  - Identify barriers to discharge w/patient and caregiver  - Arrange for needed discharge resources and transportation as appropriate  - Identify discharge learning needs (meds, wound care, etc.)  - Arrange for interpretive services to assist at discharge as needed  - Refer to Case Management Department for coordinating discharge planning if the  patient needs post-hospital services based on physician/advanced practitioner order or complex needs related to functional status, cognitive ability, or social support system  Outcome: Progressing     Problem: Knowledge Deficit  Goal: Patient/family/caregiver demonstrates understanding of disease process, treatment plan, medications, and discharge instructions  Description: Complete learning assessment and assess knowledge base.  Interventions:  - Provide teaching at level of understanding  - Provide teaching via preferred learning methods  Outcome: Progressing     Problem: CARDIOVASCULAR - ADULT  Goal: Maintains optimal cardiac output and hemodynamic stability  Description: INTERVENTIONS:  - Monitor I/O, vital signs and rhythm  - Monitor for S/S and trends of decreased cardiac output  - Administer and titrate ordered vasoactive medications to optimize hemodynamic stability  - Assess quality of pulses, skin color and temperature  - Assess for signs of decreased coronary artery perfusion  - Instruct patient to report change in severity of symptoms  Outcome: Progressing     Problem: METABOLIC, FLUID AND ELECTROLYTES - ADULT  Goal: Electrolytes maintained within normal limits  Description: INTERVENTIONS:  - Monitor labs and assess patient for signs and symptoms of electrolyte imbalances  - Administer electrolyte replacement as ordered  - Monitor response to electrolyte replacements, including repeat lab results as appropriate  - Instruct patient on fluid and nutrition as appropriate  Outcome: Progressing  Goal: Fluid balance maintained  Description: INTERVENTIONS:  - Monitor labs   - Monitor I/O and WT  - Instruct patient on fluid and nutrition as appropriate  - Assess for signs & symptoms of volume excess or deficit  Outcome: Progressing  Goal: Glucose maintained within target range  Description: INTERVENTIONS:  - Monitor Blood Glucose as ordered  - Assess for signs and symptoms of hyperglycemia and hypoglycemia  -  Administer ordered medications to maintain glucose within target range  - Assess nutritional intake and initiate nutrition service referral as needed  Outcome: Progressing     Problem: HEMATOLOGIC - ADULT  Goal: Maintains hematologic stability  Description: INTERVENTIONS  - Assess for signs and symptoms of bleeding or hemorrhage  - Monitor labs  - Administer supportive blood products/factors as ordered and appropriate  Outcome: Progressing     Problem: MUSCULOSKELETAL - ADULT  Goal: Maintain or return mobility to safest level of function  Description: INTERVENTIONS:  - Assess patient's ability to carry out ADLs; assess patient's baseline for ADL function and identify physical deficits which impact ability to perform ADLs (bathing, care of mouth/teeth, toileting, grooming, dressing, etc.)  - Assess/evaluate cause of self-care deficits   - Assess range of motion  - Assess patient's mobility  - Assess patient's need for assistive devices and provide as appropriate  - Encourage maximum independence but intervene and supervise when necessary  - Involve family in performance of ADLs  - Assess for home care needs following discharge   - Consider OT consult to assist with ADL evaluation and planning for discharge  - Provide patient education as appropriate  Outcome: Progressing  Goal: Maintain proper alignment of affected body part  Description: INTERVENTIONS:  - Support, maintain and protect limb and body alignment  - Provide patient/ family with appropriate education  Outcome: Progressing     Problem: Prexisting or High Potential for Compromised Skin Integrity  Goal: Skin integrity is maintained or improved  Description: INTERVENTIONS:  - Identify patients at risk for skin breakdown  - Assess and monitor skin integrity  - Assess and monitor nutrition and hydration status  - Monitor labs   - Assess for incontinence   - Turn and reposition patient  - Assist with mobility/ambulation  - Relieve pressure over bony  prominences  - Avoid friction and shearing  - Provide appropriate hygiene as needed including keeping skin clean and dry  - Evaluate need for skin moisturizer/barrier cream  - Collaborate with interdisciplinary team   - Patient/family teaching  - Consider wound care consult   Outcome: Progressing     Problem: Nutrition/Hydration-ADULT  Goal: Nutrient/Hydration intake appropriate for improving, restoring or maintaining nutritional needs  Description: Monitor and assess patient's nutrition/hydration status for malnutrition. Collaborate with interdisciplinary team and initiate plan and interventions as ordered.  Monitor patient's weight and dietary intake as ordered or per policy. Utilize nutrition screening tool and intervene as necessary. Determine patient's food preferences and provide high-protein, high-caloric foods as appropriate.     INTERVENTIONS:  - Monitor oral intake, urinary output, labs, and treatment plans  - Assess nutrition and hydration status and recommend course of action  - Evaluate amount of meals eaten  - Assist patient with eating if necessary   - Allow adequate time for meals  - Recommend/ encourage appropriate diets, oral nutritional supplements, and vitamin/mineral supplements  - Order, calculate, and assess calorie counts as needed  - Recommend, monitor, and adjust tube feedings and TPN/PPN based on assessed needs  - Assess need for intravenous fluids  - Provide specific nutrition/hydration education as appropriate  - Include patient/family/caregiver in decisions related to nutrition  Outcome: Progressing     Problem: NEUROSENSORY - ADULT  Goal: Achieves maximal functionality and self care  Description: INTERVENTIONS  - Monitor swallowing and airway patency with patient fatigue and changes in neurological status  - Encourage and assist patient to increase activity and self care.   - Encourage visually impaired, hearing impaired and aphasic patients to use assistive/communication  devices  Outcome: Progressing

## 2024-08-09 NOTE — PROGRESS NOTES
[facility]  Progress Note  Name: Anamaria Parnell I  MRN: [id number]  Unit/Bed#: [place] [address] -01 I Date of Admission: 7/19/2024   Date of Service: 8/9/2024 I Hospital Day: 21    Assessment & Plan   * Aortoiliac occlusive disease (HCC)  Assessment & Plan  78 yo female ex-smoker w/ a hx of obesity, CKD IV, DM II w/ peripheral neuropathy, HLD, HTN, CAD, L MCA/PCA CVA S/P L TCAR 9/7/23 ([facility]), FÉLIX, mesenteric artery stenosis and aortoiliac occlusive disease w/ PAD and chronic R hallux dry gangrene presented to [facility] on 7/16/24 w/ worsening R great toe pain and R hallux gangrene. Pt was transferred to [facility] on 7/19 w/ plans for R femoral endarterectomy w/ retrograde stenting and RLE bypass.     - s/p B/L CFAE, L JACI/EIA balloon angio and shockwave lithotripsy w/ insertion of a L EIA drug-coated stent and L JACI VBX stent, L to R PTFE fem-fem bypass, and B/L groin vac placement on 7/24.   - s/p right hallux amputation 7/29/24  - s/p 3U PRBC and 1U FFP intra-op and 1U PRBC on 7/30 and 7/31.    Plan was to return to the OR for right femoral to BK-pop bypass.  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       Imaging:  -7/30/24 CT head: Chronic left PCA territory infarct. No evidence of acute infarct, intracranial hemorrhage or mass.  -7/29/24 LEAD: Right: Evidence of patent endarterectomy with diffuse disease noted throughout the femoral-popliteal arteries without significant focal stenosis. LISA: 0.53/48/22. Left: Evidence of patent endarterectomy with diffuse disease noted throughout the femoral-popliteal arteries without significant focal stenosis. L LISA: 0.57/10/43  -7/29/24 Xray R foot:  Increasing volume of air within the soft tissues of the first toe in this patient with provided history of gangrene. The previously described cortical destruction along the ventral aspect of the first distal phalanx is not well visualized on the current examination, likely attributed to differences in patient positioning.  -7/22/24 Xray R foot: Cortical destruction along the ventral aspect of the first distal phalanx worrisome for acute osteomyelitis. Large air within the overlying soft tissues.   -7/21/24 Echo: Left ventricular wall thickness is severely increased. The left ventricular ejection fraction is 41% by biplane measurement. Systolic function is mildly reduced. Global longitudinal strain is reduced at -10% with moderate to severe strain reduction in the ED mid to basal anteroseptal septal and inferior walls.. Unable to grade diastolic dysfunction given the severe degree of mitral annular calcification.   -7/18/24 CTA abd w/ runoff: Visualized descending thoracic and suprarenal abdominal aorta demonstrate marked soft plaque with multifocal regions of plaque ulceration. A point of relative stenosis within the infrarenal abdominal aorta measures up to 7 mm in diameter. Bilateral multifocal severe stenosis within the external iliac and common femoral arteries. Bilateral long segment SFA occlusion extending from the ostia to the level of the P1 popliteal artery. Three-vessel runoff on the right, the posterior tibial artery is dominant. Two-vessel runoff on the left, the peroneal artery is dominant. Segmental visualization of the posterior tibial artery. Interval increase in size of an exophytic lesion arising medially from the stomach, again consistent with gastrointestinal stromal tumor. New dilation of the pancreatic duct within the pancreatic head, no obvious pancreatic/ampulla lesion identified.  -7/16/24 LEAD: Right: Severe diffuse disease noted throughout the femoral-popliteal arteries with high grade  stenosis vs occlusion of the proximal-distal SFA with reconstitution to the popliteal artery and high grade stenosis vs occlusion of the distal YOVANY. R LISA: 0.11/-/-. Left: Severe diffuse disease noted throughout the femoral-popliteal arteries with high grade stenosis vs occlusion of the proximal-mid SFA with reconstitution in the distal SFA and high grade stenosis vs occlusion of the entire PTA. L LISA: 0.31/20/19.     Labs: 8/9  - WBC 17.89 (22.92)  - Hgb  8.0 (8.3)  - sCr 1.77/ eGFR 27  - K+ 3.1    Plan:   -Advanced calcific atherosclerotic AIOD/PAD w/ CTLI and R hallux dry gangrene (+) OM now s/p R hallux amputation w/ partial 1st ray resection by podiatry on 7/29  -S/P B/L CFAE, L JACI/EIA balloon angio and shockwave lithotripsy w/ insertion of a L EIA drug-coated stent and L JACI VBX stent, L to R PTFE fem-fem bypass, and B/L groin vac placement on 7/24 (POD #15)  -B/L groin wound vacs intact w/ adequate suction; serous drainage, continue wound vac changes qMWF. Dressing taken down today, all black foam removed. Placed wet to dry dressing at b/l groin sites for transport to nursing facility.   -No plan for additional vascular intervention at this time. Plan to discharge to nursing facility with open toe amp and b/l groin VACs. Follow up with vascular in 2 weeks to determine next steps of right fem pop bypass   -ID following for ABX management, input appreciated; surgical cultures growing Morganella morganii  -Cardiology following   -Continue to monitor Hgb and transfuse < 7.0 per primary team. +melena, GI re-engaged. Endoscopy cancelled d/t high risk recommended PPI.   -Continue brilinta and Eliquis (per cards) and pravastatin for medical management  -GI sign off, LFTs down trending. No indication for further imaging or intervention. Re-engaged, see above  -Geriatrics input appreciated  -Continue medical management per primary team  -PT/OT follownig  -D/W Dr. Reed               Vitals:  /84   Pulse 78    "Temp (!) 97.4 °F (36.3 °C)   Resp 16   Ht 4' 10\" (1.473 m)   Wt 68.1 kg (150 lb 2.1 oz)   LMP  (LMP Unknown)   SpO2 99%   BMI 31.38 kg/m²     I/Os:  I/O last 3 completed shifts:  In: 720 [P.O.:720]  Out: 1469 [Urine:1469]  No intake/output data recorded.    Subjective: Pt is resting comfortably in bed, she is pleasantly confused/ forgetful.       Lab Results and Cultures:   CBC with diff:   Lab Results   Component Value Date    WBC 17.89 (H) 08/09/2024    HGB 8.0 (L) 08/09/2024    HCT 27.3 (L) 08/09/2024     (H) 08/09/2024     08/09/2024    RBC 2.72 (L) 08/09/2024    MCH 29.4 08/09/2024    MCHC 29.3 (L) 08/09/2024    RDW 23.7 (H) 08/09/2024    MPV 11.2 08/09/2024    NRBC 0 08/04/2024   ,   BMP/CMP:  Lab Results   Component Value Date    SODIUM 145 08/09/2024    SODIUM 138 10/20/2020    K 3.1 (L) 08/09/2024    K 3.8 10/20/2020     08/09/2024    CL 98 10/20/2020    CO2 33 (H) 08/09/2024    CO2 18 (L) 07/24/2024    CO2 23 10/20/2020    BUN 72 (H) 08/09/2024    BUN 45 (H) 10/20/2020    CREATININE 1.77 (H) 08/09/2024    GLUCOSE 179 (H) 07/24/2024    CALCIUM 8.3 (L) 08/09/2024    AST 16 08/09/2024    AST 14 09/08/2020    ALT 18 08/09/2024    ALT 13 09/08/2020    ALKPHOS 80 08/09/2024    EGFR 27 08/09/2024   ,   Lipid Panel: No results found for: \"CHOL\",   Coags:   Lab Results   Component Value Date    PTT 30 07/30/2024    INR 1.70 (H) 07/31/2024   ,     Blood Culture:   Lab Results   Component Value Date    BLOODCX No Growth After 5 Days. 07/17/2024   ,   Urinalysis:   Lab Results   Component Value Date    COLORU Light Yellow 07/17/2024    CLARITYU Clear 07/17/2024    SPECGRAV 1.016 07/17/2024    PHUR 5.5 07/17/2024    LEUKOCYTESUR Moderate (A) 07/17/2024    NITRITE Negative 07/17/2024    GLUCOSEU Negative 07/17/2024    KETONESU Negative 07/17/2024    BILIRUBINUR Negative 07/17/2024    BLOODU Negative 07/17/2024   ,   Urine Culture:   Lab Results   Component Value Date    URINECX (A) 07/17/2024 " "    >100,000 cfu/ml - Lactobacillus gasseri Lactobacillus species    URINECX <10,000 cfu/ml Micrococcus luteus (A) 07/17/2024    URINECX (A) 07/17/2024     <10,000 cfu/ml - Lactobacillus rhamnosus Lactobacillus species   ,   Wound Culure: No results found for: \"WOUNDCULT\"    Medications:  Current Facility-Administered Medications   Medication Dose Route Frequency    allopurinol (ZYLOPRIM) tablet 100 mg  100 mg Oral Daily    apixaban (ELIQUIS) tablet 5 mg  5 mg Oral BID    escitalopram (LEXAPRO) tablet 20 mg  20 mg Oral Daily    HYDROmorphone HCl (DILAUDID) injection 0.2 mg  0.2 mg Intravenous Q6H PRN    insulin detemir (LEVEMIR) subcutaneous injection 10 Units  10 Units Subcutaneous Daily    insulin lispro (HumALOG/ADMELOG) 100 units/mL subcutaneous injection 1-5 Units  1-5 Units Subcutaneous TID AC    insulin lispro (HumALOG/ADMELOG) 100 units/mL subcutaneous injection 5 Units  5 Units Subcutaneous TID With Meals    lidocaine (LIDODERM) 5 % patch 1 patch  1 patch Topical Daily    metoprolol succinate (TOPROL-XL) 24 hr tablet 25 mg  25 mg Oral Daily    ondansetron (ZOFRAN) injection 4 mg  4 mg Intravenous Q6H PRN    oxyCODONE (ROXICODONE) IR tablet 5 mg  5 mg Oral Q6H PRN    pantoprazole (PROTONIX) injection 40 mg  40 mg Intravenous Q12H JAVIER    sodium bicarbonate tablet 650 mg  650 mg Oral BID after meals    ticagrelor (BRILINTA) tablet 90 mg  90 mg Oral Q12H JAVIER    torsemide (DEMADEX) tablet 10 mg  10 mg Oral Daily       Imaging:  See Above    Physical Exam:    General appearance:  alert and oriented to person and plan  Lungs:  even unlabored, O2 vioa NC  Heart: regular rate and rhythm, S1, S2 normal, no murmur, click, rub or gallop  Abdomen: soft, non-tender; bowel sounds normal; no masses,  no organomegaly and ecchymotic  Extremities:  perfused, M/s intact, R foot podiatric dsg in place. B/l prevlalon boots     Wound/Incision:        B/L groin black sponge x4 removed with wet to dry in place for transfer to [facility]" facility.     Pulse exam:  Radial: Right: 2+ Left:: 2+  DP: Right: dressing Left: doppler signal    GRACIELA Whitaker  8/9/2024  [facility]  [phone]

## 2024-08-09 NOTE — ASSESSMENT & PLAN NOTE
Lab Results   Component Value Date    HGBA1C 5.9 (H) 07/09/2024     Recent Labs     08/08/24  1607 08/08/24  2106 08/09/24  0738 08/09/24  1103   POCGLU 156* 213* 172* 161*     Stable continue levemir 10 units

## 2024-08-09 NOTE — PROGRESS NOTES
Progress Note - Geriatric Medicine   Anamaria Parnell 79 y.o. female MRN: [id number]  Unit/Bed#: [place] [address] -01 Encounter: [id number]      Assessment/Plan:    Acute Encephalopathy  Presented to the hospital for right great toe pain/gangrene  Baseline mentation is alert and oriented x 4   Patient was noted to have altered mental status on 7/30 and there was some concern for stroke   Workup was negative   Current cause considerations   Suspected to be multifactorial secondary to right hallux gangrene status post right partial first ray amputation, status post bilateral femoral endarterectomy with left to right femorofemoral PTFE bypass and retrograde left JACI, left EIA stenting, and bilateral groin VAC placement, acute pain, acute liver failure without hepatic coma (LFTs improving from last set of labs on 8/4), sleep disturbances, vision impairment, and prolonged hospitalization   Patient was noted to have lactic acidosis the night of 7/30 which has since resolved  UA negative for UTI on admission   Leukocytosis improving on labs today (down to 17.89 from 22.92)  ID was following but has since signed off   Hemoglobin down to 8.0 today from 8.3 yesterday   Patient is alert and awake on my exam today but she remains disoriented   She is oriented to person and place but is disoriented to time   Identify and treat reversible causes of confusion including infection, dehydration, electrolyte imbalance, anemia, hypoxia, urinary retention, constipation, pain, and sleep disturbance  Maintain delirium precautions   Provide redirection, reorientation, and distraction techniques  Maintain fall and safety precautions   Assist with ADLs/IADLs  Avoid deliriogenic medications such as tramadol, benzodiazepines, anticholinergics, benadryl  Treat pain using geriatric pain protocol   Encourage oral hydration and nutrition   Monitor for constipation and urinary retention   Implement sleep hygiene and limit night time interuptions    Maintain sleep-wake cycle   Encourage early and frequent mobilization   Most recent EKG on 7/30/2024 revealed a QTc interval of 525  Medication at this time is extremely limited for acute agitation and behaviors   Would avoid antipsychotics for acute agitation and behaviors secondary to prolonged QTc interval   Would also avoid Depakote due to acute liver failure   While not typically recommended in geriatric patients can consider low dose lorazepam 0.25 mg if needed for acute agitation and behaviors as other options are not feasible at this time   Redirect and reorient as needed  Keep mentally, physically, and socially active    Cognitive Screening   Per chart review, it appears that the patient has a prior history of memory loss, however, I do not see any workup for this diagnosis and it does not appear as a documented diagnosis in [facility]  Patients son notes the patient is alert and oriented x 4 at baseline and has no issues or difficulties with her memory   Patient is alert and awake on my exam today but she remains disoriented and confused   She is oriented to person and place but is disoriented to time   Most recent TSH on 7/27/2024 noted to be 2.309  Most recent vitamin B 12 level on 2/25/2019 noted to be 502  Consider checking on routine labs   CT of the head on 7/30/2024 revealed chronic left PCA infarct and microangiopathic changes   No MoCA or cognitive testing noted in epic  Maintain delirium precautions as discussed below  Redirect and reorient as needed  Keep physically, mentally, and socially active     Deconditioning   Patient is at increased risk for deconditioning secondary to right hallux gangrene status post right partial first ray amputation, status post bilateral femoral endarterectomy with left to right femorofemoral PTFE bypass and retrograde left JACI, left EIA stenting, and bilateral groin VAC placement, anesthesia, acute pain, acute blood loss anemia (hemoglobin improving on labs from 8/4),  weakness, gait dysfunction, and prolonged hospitalization   Continue to optimize diet, hydration, and mobility for healing   Stage IV Chronic Kidney Disease   Baseline creatinine appears to be 1.2-1.8  Patient now with RICARDO which appears to have resolved (creatinine 1.77 on labs today)  Patient does follow with neurology in the outpatient setting   Nephrology had been consulted but has since signed off   Avoid nephrotoxic medication and renal dose medication   Keep hydrated   PO intake  Patient notes she has a decreased appetite   She notes that she used to have a very good appetite when she was on insulin but since being transitioned to oral tradjenta her appetite has decreased   She does drink carnation breakfast drinks at home  She is receiving glucerna with meals here  Encourage oral hydration and nutrition   Congestive heart failure   Most recent echo on 7/21/2024 revealed an EF of 41%  Patient does not appear to be on any diuretics at baseline  She is currently on torsemide 10 mg daily though this was held on 7/30 and 7/31 due to low blood pressures   Cardiology is consulted and following  Recommend low-sodium diet  Continue to monitor weights and I&O  Management per cardiology  Type II Diabetes   Hemoglobin A1c on 7/9/2024 noted to be 5.9 which is pretty tightly controlled for patient's age  Patient has been on insulin in the past but once her A1c was improved she was transitioned over to Tradjenta which she and her son note have affected her appetite  Patient is currently on blood sugar checks and while blood sugars have been elevated they are acceptable  Her current regimen is Humalog 5 units + SSI with meals and Levemir 10 units daily  Continue insulin and diabetic diet  Avoid hypoglycemia  Acute Blood Loss Anemia  Baseline hemoglobin appears to be 11-13  She has been primarily trending in the 7's for the past several days  She was noted to have a hemoglobin of 6.4 on 7/30 and received a unit of blood  Her  hemoglobin on 7/31 was 7.4 and she received another unit of blood  Hemoglobin down to 8.0 on labs today   Continue to monitor CBC  Transfuse for hemoglobin < 7   Monitor for signs and symptoms of infection, dehydration, DVT, and skin breakdown    Frailty   Clinical Frail Scale: 5- Mildly Frail (baseline)  More evident slowing, needs help high order IADLs (transport, bills, medications)  Progressively impairs shopping and walking outside alone, meal prep and housework  Clinical Frail Scale: 6- Moderately Frail (current)  Need help with all outside activities  Need help with stairs and bathing  May need assistance with dressing  Most recent albumin on labs today noted to be 2.7  Consider nutrition consult  Encourage protein supplementation     Ambulatory Dysfunction/Falls  Patient has not had any recent falls at home  She notes she has both a roller walker and cane at home  Her son notes that she has an electric scooter that she primarily uses and then will supplement with the cane when ambulating   PT/OT consulted to assist with strengthening/mobility and assist with discharge planning to appropriate level of care  Assess patient frequently for physical needs, encourage use of assistant devices as needed and directed by PT/OT  Identify cognitive and physical deficits and behaviors that affect risk of falls  Consider moving patient closer to nursing station to monitor more closely for impulsive behavior which may increase risk of falls  [place] fall precautions   Educate patient/family on patient safety including physical limitations and importance of using call bell for assistance   Modify environment to reduce risk of injury including disconnecting from pole when not in use, ensuring adequate lighting in room and restroom, ensuring that path to restroom is clear and free of trip hazards  Out of bed as tolerated     Impaired Vision   Patient does have vision impairment   She does wear eyeglasses  Recommend use of  corrective lenses at all appropriate times  Encourage adequate lighting and encourage use of assistance with ambulation  Keep personal belongings close to avoid reaching  Encourage appropriate footwear at all times  Recommend large font for printed materials provided to patient    Dentition/Appetite   Patient does not wear dentures at baseline   She notes that her appetite is fair as discussed above   Ensure meal consistency is appropriate for all abilities   Consider nutrition consult   Continue aspiration precautions     Elimination   Patient is continent of bowel and bladder at baseline  She has been recently having incontinent episodes here   Suspect this may be related to encephalopathy   Patient has no documented history of constipation   Last documented bowel movement was yesterday   She had been receiving lactulose TID but this appears to have been discontinued   She has been having melena and is being monitored by GI  Appears that EGD has been deferred at this time   Monitor for constipation and urinary retention     Insomnia   Patient notes she has some difficulty sleeping at home   She notes that she sleeps in a recliner chair at baseline   She is on trazodone 50 mg daily at bedtime at baseline   Her son noted that he works overnights and does not get home until around 0300 and she is often awake when he gets home and she sleeps on his schedule  Trazodone is currently on hold   Nursing notes that the patient slept last night   Would hold off on restarting trazodone as patient has been sleeping much of the day and night here   First line is behavioral therapy   Avoid sedative hypnotics including benzodiazepines and benadryl  Encourage staying awake during the day   Encourage daytime activities and morning exercise   Decrease or eliminate daytime naps   Avoid caffeine especially during late afternoon and evening hours  Establish a nighttime routine  Implement sleep hygiene and limit nighttime  interruptions    Anxiety/Depression  Patient has a documented history of depression   She is on escitalopram at baseline   Patient appears to be fatigued but mood appears stable today   Continue current medication regimen    Continue supportive care     Right Hallux Gangrene  Patient noted to have gangrene of the right hallux on admission to [facility]  She was transferred to [facility] for surgical vascular intervention  She was subsequently evaluated by podiatry for this and is now POD 10 from a right partial first ray amputation  Intraoperative cultures were positive for 3+ Morganella morganii and 1+ growth of mixed skin jun   Patient had been on antibiotics but these have since been discontinued   ID had been following but has since signed off   Dressing changes to be completed by podiatry  Patient is currently denying pain on exam today  Pain management as discussed below  PT/OT consulted and following  Management per podiatry    Aortoiliac Occlusive Disease   Patient with PAD with occlusion of multiple vessels  Patient was transferred from [facility] to [facility] for surgical vascular intervention  Patient is now POD 15 from a bilateral femoral endarterectomy with left to right femorofemoral PTFE bypass and retrograde left JACI, left EIA stenting, and bilateral groin VAC placement   Wound VAC remains intact  Patient was to return to the OR last week, however, this was postponed secondary to encephalopathy   Patient was tentatively returning to the OR on 8/6, however, it now appears that further intervention is on hold indefinitely due to patients medical condition and her desire to not undergo any further surgical intervention at this time   Patient is currently denying pain  Pain management as discussed below  Management per vascular surgery    Acute Liver Failure without Hepatic Coma  Patient with elevated LFTs suspected to be related to acute illness and  cephalosporins  There was some concern for shock on 7/30 as the patient was anemic and had elevated troponins  Ammonia level on 7/30 noted to be elevated at 73  Patient was started on lactulose last week and ammonia level on labs on 7/31 noted to be improved 48  Lactulose has since been discontinued   GI is consulted and following  An ultrasound of the right upper quadrant on 7/31 revealed the common duct is mildly dilated at 8 mm  Recommended nonemergent MRCP for persistent LFT abnormality  LFTs have significantly improved  Continue to monitor liver function   Management per primary team     Elevated Troponins  Patient was noted to have elevated troponins on 7/30  She was noted to be tachycardic and anemic  She did receive a unit of blood on 7/30 and anemia improved  She had been on telemetry but this has since been discontinued   Cardiology is consulted and following  Management per cardiology and primary team    Acute Pain due to Surgery  Patient is POD 10 from a right partial first ray amputation and POD 15 from a bilateral femoral endarterectomy with left to right femorofemoral PTFE bypass and retrograde left JACI, left EIA stenting, and bilateral groin VAC placement   She is complaining of pain today  Would recommend treating pain using the geriatric pain protocol as discussed below  Oxycodone 2.5 mg po Q 4 prn moderate pain  Oxycodone 5 mg po Q 4 prn severe pain   Dilaudid 0.2 mg IV Q 4 prn breakthrough pain  Would avoid Tylenol and gabapentin at this time secondary to acute liver failure and RICARDO  Monitor for constipation  Continue nonpharmacological methods of pain management      Subjective:   The patient is being seen and evaluated today at the bedside for geriatric follow-up.  She is noted to be lying in bed comfortably in no acute distress. She is noted to be confused today and is oriented to person and place. She is complaining of pain and nursing is getting pain medication for her.     Care was  "coordinated with patients nurse Jose. He notes no acute issues or events overnight.       Review of Systems   Constitutional:  Positive for activity change, appetite change (decreased appetite) and fatigue.   HENT:  Negative for dental problem and hearing loss.    Eyes:  Positive for visual disturbance (glasses).   Respiratory:  Negative for cough and shortness of breath.    Cardiovascular:  Negative for chest pain and leg swelling.   Gastrointestinal:  Negative for abdominal distention and abdominal pain.   Genitourinary:  Negative for difficulty urinating and dysuria.   Musculoskeletal:  Positive for back pain and gait problem. Negative for arthralgias.   Skin:  Negative for color change and pallor.   Neurological:  Positive for weakness. Negative for speech difficulty.   Psychiatric/Behavioral:  Positive for confusion. Negative for agitation and sleep disturbance. The patient is not nervous/anxious.          Objective:     Vitals: Blood pressure (!) 144/101, pulse 78, temperature (!) 97.4 °F (36.3 °C), resp. rate 16, height 4' 10\" (1.473 m), weight 68.1 kg (150 lb 2.1 oz), SpO2 99%.,Body mass index is 31.38 kg/m².      Intake/Output Summary (Last 24 hours) at 8/9/2024 0842  Last data filed at 8/9/2024 0552  Gross per 24 hour   Intake 600 ml   Output 889 ml   Net -289 ml       Current Medications: Reviewed    Physical Exam:   Physical Exam  Vitals and nursing note reviewed.   Constitutional:       General: She is not in acute distress.     Appearance: She is not ill-appearing.   HENT:      Head: Normocephalic.      Mouth/Throat:      Mouth: Mucous membranes are dry.   Eyes:      General: No scleral icterus.     Conjunctiva/sclera: Conjunctivae normal.   Cardiovascular:      Rate and Rhythm: Normal rate and regular rhythm.   Pulmonary:      Effort: Pulmonary effort is normal. No respiratory distress.   Abdominal:      General: Bowel sounds are normal. There is no distension.      Palpations: Abdomen is soft.      " "Tenderness: There is no abdominal tenderness.   Musculoskeletal:         General: Tenderness (back) present.      Right lower leg: No edema.      Left lower leg: No edema.   Skin:     General: Skin is warm and dry.      Comments: Wound VAC to groin   Right foot dressing clean, dry, and intact   Neurological:      Mental Status: She is alert.      Motor: Weakness present.      Gait: Gait abnormal.      Comments: Oriented to person and place  Disoriented to time        Invasive Devices       Peripheral Intravenous Line  Duration             Peripheral IV 07/31/24 Right Antecubital 9 days              Drain  Duration             External Urinary Catheter <1 day                    Lab, Imaging and other studies: I have personally reviewed pertinent reports.      Please note:  Voice-recognition software may have been used in the preparation of this document.  Occasional wrong word or \"sound-alike\" substitutions may have occurred due to the inherent limitations of voice recognition software.  Interpretation should be guided by context.    "

## 2024-08-09 NOTE — RESTORATIVE TECHNICIAN NOTE
Restorative Technician Note      Patient Name: Anamaria Parnell     Restorative Tech Visit Date: 08/09/24  Note Type: Mobility  Patient Position Upon Consult: Bedside chair  Activity Performed: Range of motion; Transferred; Repositioned  Assistive Device: Other (Comment) (Slide board)  Patient Position at End of Consult: All needs within reach; Supine

## 2024-08-12 ENCOUNTER — TELEPHONE (OUTPATIENT)
Dept: VASCULAR SURGERY | Facility: CLINIC | Age: 79
End: 2024-08-12

## 2024-08-12 ENCOUNTER — NURSING HOME VISIT (OUTPATIENT)
Dept: GERIATRICS | Facility: OTHER | Age: 79
End: 2024-08-12
Payer: COMMERCIAL

## 2024-08-12 VITALS
HEART RATE: 76 BPM | WEIGHT: 150 LBS | DIASTOLIC BLOOD PRESSURE: 65 MMHG | BODY MASS INDEX: 31.35 KG/M2 | OXYGEN SATURATION: 97 % | SYSTOLIC BLOOD PRESSURE: 121 MMHG | RESPIRATION RATE: 16 BRPM | TEMPERATURE: 97.8 F

## 2024-08-12 DIAGNOSIS — I10 PRIMARY HYPERTENSION: ICD-10-CM

## 2024-08-12 DIAGNOSIS — K92.1 MELENA: ICD-10-CM

## 2024-08-12 DIAGNOSIS — I50.22 CHRONIC HFREF (HEART FAILURE WITH REDUCED EJECTION FRACTION) (HCC): ICD-10-CM

## 2024-08-12 DIAGNOSIS — N18.4 CKD STAGE 4 SECONDARY TO HYPERTENSION (HCC): ICD-10-CM

## 2024-08-12 DIAGNOSIS — E11.51 TYPE 2 DIABETES MELLITUS WITH DIABETIC PERIPHERAL ANGIOPATHY WITHOUT GANGRENE, WITHOUT LONG-TERM CURRENT USE OF INSULIN (HCC): ICD-10-CM

## 2024-08-12 DIAGNOSIS — I48.0 PAROXYSMAL ATRIAL FIBRILLATION (HCC): ICD-10-CM

## 2024-08-12 DIAGNOSIS — F33.9 DEPRESSION, RECURRENT (HCC): ICD-10-CM

## 2024-08-12 DIAGNOSIS — D62 ACUTE BLOOD LOSS ANEMIA: ICD-10-CM

## 2024-08-12 DIAGNOSIS — I12.9 CKD STAGE 4 SECONDARY TO HYPERTENSION (HCC): ICD-10-CM

## 2024-08-12 DIAGNOSIS — I73.9 CLAUDICATION IN PERIPHERAL VASCULAR DISEASE (HCC): ICD-10-CM

## 2024-08-12 DIAGNOSIS — R74.01 TRANSAMINITIS: ICD-10-CM

## 2024-08-12 DIAGNOSIS — A41.9 SEPSIS, DUE TO UNSPECIFIED ORGANISM, UNSPECIFIED WHETHER ACUTE ORGAN DYSFUNCTION PRESENT (HCC): Primary | ICD-10-CM

## 2024-08-12 PROCEDURE — 99306 1ST NF CARE HIGH MDM 50: CPT | Performed by: FAMILY MEDICINE

## 2024-08-12 NOTE — ASSESSMENT & PLAN NOTE
Lab Results   Component Value Date    HGBA1C 5.9 (H) 07/09/2024   Patient has history of T2DM with A1c below goal of 7. Patient previously on tradjenta which was discontinued during her hospitalization. Blood sugars checked in [facility] post [facility] elevated to 200s in setting of nightly 10 u lantus.     Plan:  Resume linagliptin 5 mg daily  Continue 10 u lantus nightly  Continue blood glucose checks twice daily  Monitor for signs of hypoglycemia  Will recheck A1c in 3 months

## 2024-08-12 NOTE — ASSESSMENT & PLAN NOTE
Wt Readings from Last 3 Encounters:   08/12/24 68 kg (150 lb)   08/09/24 68.1 kg (150 lb 2.1 oz)   07/19/24 67.2 kg (148 lb 2.4 oz)   Patient has HFrEF 30% in July 2024. Patient on metoprolol 25 mg daily and torsemide 10 mg daily. Patient has trace edema present bilaterally on examination today but not rales present.     Plan:  Continue Torsemide and metoprolol   Continue to monitor volume status  Monitor weekly weights

## 2024-08-12 NOTE — ASSESSMENT & PLAN NOTE
Patient has bilateral PAD and underwent stenting with vascular surgery during this admission.    Plan  Monitor pulses in extremities  Continue daily whitney  Follow up with vascular surgery 8/13

## 2024-08-12 NOTE — ASSESSMENT & PLAN NOTE
Lab Results   Component Value Date    EGFR 27 08/09/2024    EGFR 26 08/08/2024    EGFR 31 08/07/2024    CREATININE 1.77 (H) 08/09/2024    CREATININE 1.84 (H) 08/08/2024    CREATININE 1.58 (H) 08/07/2024   Patient has CKD EGFR 27 on last labs. Patient also had RICARDO of CKD in setting of hypotension during most recent hospitalization.     Plan:  CMP ordered, check renal function  Avoid nephrotoxic agents  Monitor urine output

## 2024-08-12 NOTE — TELEPHONE ENCOUNTER
Vascular Nurse Navigator Post Op Call    Procedure: Bilateral  common femoral endarterectomy/profundoplasty with bovine pericardial patch plasty  Abdominal pelvic angiography/bilateral iliofemoral angiography, right lower extremity completion arteriogram  Left common/External iliac standard balloon angioplasty  Left common/external iliac shockwave lithotripsy  Left common iliac VBX stent placement  Left external iliac drug-coated stent placement  Left to right 8 mm ringed PTFE femoral-femoral bypass  Bilateral groin subcutaneous groin VAC placement    Date of Procedure: 7/24/24    Surgeon: Panel 1:     * Jose Crum, DO - Primary     * Dennys De La Cruz MD - Assisting     * Dennys Grace MD - Assisting     * Harvey Rey DO - Fellow    Discharge Date: 8/9/24    Discharge Disposition: Other Skilled Nursing Facility at [facility]    Leg Weakness?: No    Leg Swelling?: No    Leg Numbness?: No    Chest Pain?: No    Shortness of Breath?: No    Orthopnea?: No    Anticoagulation pt was discharged on post op?: Apixaban (Eliquis) and Ticagrelor (Brillanta)    Statin pt was discharged on post op?:  Pravachol (pravastatin)    Bleeding?: No    Uncontrolled Pain?: No    Incision Concerns?: No    Fever or Chills?: No      Reviewed discharge instructions and incision care with patient.      NEXT OFFICE VISIT SCHEDULED:  8/28/24 at 10:30 am with Dr. Gallo at [facility]    Transportation Confirmed?: Yes - facility to arrange transportation      Any further questions/concerns?  Spoke with Xiomy, nurse at [facility], in regards to above.  She stated that she just changed patient's bilateral groin wound vac and the wounds are unchanged from since discharge from the hospital.  She denies any signs of infection.  Reviewed discharge medications - Eliquis and Brillinta.  All questions answered.  No concerns expressed at this time.

## 2024-08-12 NOTE — ASSESSMENT & PLAN NOTE
Patient has history of depression, symptoms well controlled on lexapro 20 mg daily.    Plan:  Continue lexapro  Maintain sleep wake cycle

## 2024-08-12 NOTE — ASSESSMENT & PLAN NOTE
Patient has acute blood loss anemia in setting of melanotic stools. Patient continues with dark, tarry stools and epigastric discomfort. Concern for slow GI bleed during most recent hospitalization. Patient was deemed too high risk for endoscopy and medically managed with IV PPI and recommended for blood transfusions as needed. Last Hgb 8 on 8/9.   Lab Results   Component Value Date    WBC 17.89 (H) 08/09/2024    HGB 8.0 (L) 08/09/2024    HCT 27.3 (L) 08/09/2024     (H) 08/09/2024     08/09/2024     Lab Results   Component Value Date    IRON 21 (L) 07/20/2024    TIBC 116 (L) 07/20/2024         Plan:  Continue oral PPI BID  Recheck CBC  Transfuse for Hgb <7  Monitor for dark tarry stools  Daily oral iron supplementation  Consider initiating venofer infusions if concern for GI bleed continues

## 2024-08-12 NOTE — ASSESSMENT & PLAN NOTE
Patient continues with dark, tarry stools and epigastric discomfort. Concern for slow GI bleed during most recent hospitalization. Patient was deemed too high risk for endoscopy and medically managed with IV PPI and recommended for blood transfusions as needed. Last Hgb 8 on 8/9.   Lab Results   Component Value Date    IRON 21 (L) 07/20/2024    TIBC 116 (L) 07/20/2024         Plan:  Continue oral PPI BID  Recheck CBC  Transfuse for Hgb <7  Monitor for dark tarry stools  Daily iron supplementation

## 2024-08-12 NOTE — ASSESSMENT & PLAN NOTE
Patient had transaminitis during most recently hospitalization in setting of hypotension.   Lab Results   Component Value Date    SODIUM 145 08/09/2024    K 3.1 (L) 08/09/2024     08/09/2024    CO2 33 (H) 08/09/2024    AGAP 9 08/09/2024    BUN 72 (H) 08/09/2024    CREATININE 1.77 (H) 08/09/2024    GLUC 163 (H) 08/09/2024    GLUF 121 (H) 04/19/2024    CALCIUM 8.3 (L) 08/09/2024    AST 16 08/09/2024    ALT 18 08/09/2024    ALKPHOS 80 08/09/2024    TP 5.1 (L) 08/09/2024    TBILI 1.56 (H) 08/09/2024    EGFR 27 08/09/2024     Plan:  Will recheck cmp tomorrow

## 2024-08-12 NOTE — ASSESSMENT & PLAN NOTE
Patient has pmh significant for hypertension and heart failure on metoprolol 25 mg and torsemide 10 mg daily. Patient no longer taking amlodipine or clonidine. Patient experienced hypotension during most recent hospitalization and metoprolol dosing was decreased by cardiology.     Plan:  Continue to monitor blood pressure daily  Continue metoprolol and torsemide

## 2024-08-12 NOTE — PROGRESS NOTES
[facility]  [address]  [place] [address]   [facility]  History and Physical    NAME: Anamaria Parnell  AGE: 79 y.o. SEX: female [phone]    DATE OF ENCOUNTER: 8/12/2024    Code status:  No CPR    Assessment and Plan   1. Sepsis, due to unspecified organism, unspecified whether acute organ dysfunction present (formerly [facility])  Assessment & Plan:  Patient recently hospitalized from 7/16-8/9 for sepsis in setting of gangrenous right great toe. Patient received IV antibiotics and underwent amputation with podiatry and presumed surgical cure. Blood cultures remained negative however wound cultures grew moranella morganii. Patient WBC elevated to 17 on bloodwork last checked 8/9.    Plan:  Monitor vitals for fevers or tachycardia  Will check WBC tomorrow on CBC  No need for further antibiotics at this time  Local wound care  Follow up with podiatry  2. Claudication in peripheral vascular disease (formerly [facility])  Assessment & Plan:  Patient has bilateral PAD and underwent stenting with vascular surgery during this admission.    Plan  Monitor pulses in extremities  Continue daily brillinta  Follow up with vascular surgery 8/13  3. Type 2 diabetes mellitus with diabetic peripheral angiopathy without gangrene, without long-term current use of insulin (formerly [facility])  Assessment & Plan:    Lab Results   Component Value Date    HGBA1C 5.9 (H) 07/09/2024   Patient has history of T2DM with A1c below goal of 7. Patient previously on tradjenta which was discontinued during her hospitalization. Blood sugars checked in [facility] post acute rehab elevated to 200s in setting of nightly 10 u lantus.     Plan:  Resume linagliptin 5 mg daily  Continue 10 u lantus nightly  Continue blood glucose checks twice daily  Monitor for signs of hypoglycemia  Will recheck A1c in 3 months  4. Paroxysmal atrial fibrillation (formerly [facility])  Assessment & Plan:  Patient has history of prior strokes in setting of paroxysmal atrial fibrillation on  anticoagulation. Patient was previously on brillinta and aspirin prior to hospitalization, asa was discontinued in setting of melanotic stools and decreasing hemoglobin.    Plan:  Continue brillinta  Continue to monitor daily vitals  5. Primary hypertension  Assessment & Plan:  Patient has pmh significant for hypertension and heart failure on metoprolol 25 mg and torsemide 10 mg daily. Patient no longer taking amlodipine or clonidine. Patient experienced hypotension during most recent hospitalization and metoprolol dosing was decreased by cardiology.     Plan:  Continue to monitor blood pressure daily  Continue metoprolol and torsemide  6. Chronic HFrEF, LVEF 30%, LVIDd 4.6 cm, AHA Stage C  Assessment & Plan:  Wt Readings from Last 3 Encounters:   08/12/24 68 kg (150 lb)   08/09/24 68.1 kg (150 lb 2.1 oz)   07/19/24 67.2 kg (148 lb 2.4 oz)   Patient has HFrEF 30% in July 2024. Patient on metoprolol 25 mg daily and torsemide 10 mg daily. Patient has trace edema present bilaterally on examination today but not rales present.     Plan:  Continue Torsemide and metoprolol   Continue to monitor volume status  Monitor weekly weights              7. Melena  Assessment & Plan:  Patient continues with dark, tarry stools and epigastric discomfort. Concern for slow GI bleed during most recent hospitalization. Patient was deemed too high risk for endoscopy and medically managed with IV PPI and recommended for blood transfusions as needed. Last Hgb 8 on 8/9.   Lab Results   Component Value Date    IRON 21 (L) 07/20/2024    TIBC 116 (L) 07/20/2024         Plan:  Continue oral PPI BID  Recheck CBC  Transfuse for Hgb <7  Monitor for dark tarry stools  Daily iron supplementation  8. CKD stage 4 secondary to hypertension (HCC)  Assessment & Plan:  Lab Results   Component Value Date    EGFR 27 08/09/2024    EGFR 26 08/08/2024    EGFR 31 08/07/2024    CREATININE 1.77 (H) 08/09/2024    CREATININE 1.84 (H) 08/08/2024    CREATININE 1.58  (H) 08/07/2024   Patient has CKD EGFR 27 on last labs. Patient also had RICARDO of CKD in setting of hypotension during most recent hospitalization.     Plan:  CMP ordered, check renal function  Avoid nephrotoxic agents  Monitor urine output  9. Acute blood loss anemia  Assessment & Plan:  Patient has acute blood loss anemia in setting of melanotic stools. Patient continues with dark, tarry stools and epigastric discomfort. Concern for slow GI bleed during most recent hospitalization. Patient was deemed too high risk for endoscopy and medically managed with IV PPI and recommended for blood transfusions as needed. Last Hgb 8 on 8/9.   Lab Results   Component Value Date    WBC 17.89 (H) 08/09/2024    HGB 8.0 (L) 08/09/2024    HCT 27.3 (L) 08/09/2024     (H) 08/09/2024     08/09/2024     Lab Results   Component Value Date    IRON 21 (L) 07/20/2024    TIBC 116 (L) 07/20/2024         Plan:  Continue oral PPI BID  Recheck CBC  Transfuse for Hgb <7  Monitor for dark tarry stools  Daily oral iron supplementation  Consider initiating venofer infusions if concern for GI bleed continues  10. Transaminitis  Assessment & Plan:  Patient had transaminitis during most recently hospitalization in setting of hypotension.   Lab Results   Component Value Date    SODIUM 145 08/09/2024    K 3.1 (L) 08/09/2024     08/09/2024    CO2 33 (H) 08/09/2024    AGAP 9 08/09/2024    BUN 72 (H) 08/09/2024    CREATININE 1.77 (H) 08/09/2024    GLUC 163 (H) 08/09/2024    GLUF 121 (H) 04/19/2024    CALCIUM 8.3 (L) 08/09/2024    AST 16 08/09/2024    ALT 18 08/09/2024    ALKPHOS 80 08/09/2024    TP 5.1 (L) 08/09/2024    TBILI 1.56 (H) 08/09/2024    EGFR 27 08/09/2024     Plan:  Will recheck cmp tomorrow  11. Depression, recurrent (HCC)  Assessment & Plan:  Patient has history of depression, symptoms well controlled on lexapro 20 mg daily.    Plan:  Continue lexapro  Maintain sleep wake cycle      All medications and routine orders were  reviewed and updated as needed.    Plan discussed with: Patients child    Chief Complaint     Seen for admission at Nursing Facility    History of Present Illness     Patient is a 79 year old female with PMH significant for PAD, hypertension, diabetes, HFrEF (30%), CKD stage 4, paroxysmal afib, and depression. Patient was admitted 7/16/24 to [facility] for sepsis due to 7/16/24 toe ulcer/gangrene. Patient transferred to [facility] and underwent bilateral balloon angioplasty with left lower extremity stenting. Patient also had partial amputation of first ray completed with podiatry on 7/29/24. Intra-operative cultures grew morganella morganii and patient was treated with appropriate antibiotics. Hospital course complicated by  transaminitis during hospital stay presumably due to transient hypotension. Patient was also seen by cardiology for tachy-jeff syndrome and paroxysmal afib with baseline LBB and first degree AV block. Patient aspirin was discontinued and she was continued on brillinta her symptoms improved with modification of home beta blocker therapy. Patient also developed melanotic stools with resulting anemia however patient was deemed too high risk for endoscopy. Patient was managed conservatively by GI with IV PPI and reccommended for transfusions if hgb dropped below 7 . Patient was deemed medically stable for discharge to rehab and was admitted to [facility] post[facility] re[facility] on 8/9.    Upon examination at bedside this am, patient reports she feels very tired and complains of nausea. Adult son at bedside explains that he has noticed patient is very low energy and eating very poorly over past few days and also appears confused at times. At home prior to extended hospitalization, son reports patient was very independent able to transfer self and utilize bathroom independently. Patient also complains of acute worsening of chronic back pain from laying in bed and notes presence of sacral wounds.      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[facility]) 2021    Endometriosis     High cholesterol     History of left common carotid artery stent placement 10/27/2023    Hypertension     Kidney disease, chronic, stage III (moderate, EGFR 30-59 ml/min) ([facility])     Lumbar disc herniation     Median arcuate ligament syndrome ([facility]) 2019    Neuropathy     Obesity (BMI 30-39.9) 2017    Obesity, morbid ([facility]) 2021    Peripheral vascular disease ([facility])     Pneumonia     Shoulder injury     left    Spinal stenosis     Stroke ([facility])     Memory loss     Family History   Problem Relation Age of Onset    Hypertension Mother     Heart disease Mother         Valvular    Hyperlipidemia Mother     Hypertension Father     Heart defect Father         Cardiomegaly    Stroke Sister         Cerebrovascular Accident    Arthritis Brother     Other Brother         Back Disorder     Social History     Socioeconomic History    Marital status: /Civil Union     Spouse name: Not on file    Number of children: Not on file    Years of education: Not on file    Highest education level: Not on file   Occupational History    Occupation: retired   Tobacco Use    Smoking status: Former     Current packs/day: 0.00     Average packs/day: 2.0 packs/day for 54.6 years (109.1 ttl pk-yrs)     Types: Cigarettes     Start date: 1966     Quit date: 2020     Years since quittin.0    Smokeless tobacco: Never   Vaping Use    Vaping status: Never Used   Substance and Sexual Activity    Alcohol use: Never    Drug use: Never    Sexual activity: Not Currently     Partners: Male   Other Topics Concern    Not on file   Social History Narrative    Occasional Caffeine Consumption     Social Determinants of Health      Financial Resource Strain: Not on file   Food Insecurity: No Food Insecurity (7/22/2024)    Hunger Vital Sign     Worried About Running Out of Food in the Last Year: Never true     Ran Out of Food in the Last Year: Never true   Transportation Needs: No Transportation Needs (7/22/2024)    PRAPARE - Transportation     Lack of Transportation (Medical): No     Lack of Transportation (Non-Medical): No   Physical Activity: Not on file   Stress: Not on file   Social Connections: Not on file   Intimate Partner Violence: Not on file   Housing Stability: Low Risk  (7/22/2024)    Housing Stability Vital Sign     Unable to Pay for Housing in the Last Year: No     Number of Times Moved in the Last Year: 0     Homeless in the Last Year: No       Allergies:  Allergies   Allergen Reactions    Fenofibrate Other (See Comments)      blood in urine  hx  Kidney Failure    Colesevelam Other (See Comments)      leg pains    Colestipol Itching and Other (See Comments)      Swelling lower legs    Ezetimibe GI Intolerance    Statins Myalgia       Review of Systems     Review of Systems   Constitutional:  Positive for fatigue. Negative for chills and fever.   HENT:  Negative for nosebleeds and rhinorrhea.    Eyes:  Negative for discharge and redness.   Respiratory:  Negative for cough, chest tightness, shortness of breath, wheezing and stridor.    Cardiovascular:  Negative for chest pain and leg swelling.   Gastrointestinal:  Positive for abdominal pain, blood in stool (black stools) and nausea. Negative for abdominal distention, diarrhea and vomiting.   Genitourinary:  Negative for dysuria, flank pain and hematuria.   Musculoskeletal:  Positive for back pain and gait problem. Negative for arthralgias.   Skin:  Negative for pallor.   Neurological:  Positive for weakness. Negative for tremors, seizures, syncope and headaches.   Psychiatric/Behavioral:  Negative for agitation, behavioral problems and confusion.        Medications and orders      All medications reviewed and updated in [facility] EMR.      Objective     Vitals:   Vitals:    08/12/24 1006   BP: 121/65   Pulse: 76   Resp: 16   Temp: 97.8 °F (36.6 °C)   SpO2: 97%         Physical Exam  Vitals and nursing note reviewed.   Constitutional:       Comments: Tired appearing   HENT:      Right Ear: External ear normal.      Left Ear: External ear normal.      Nose: Nose normal.      Mouth/Throat:      Mouth: Mucous membranes are moist.   Eyes:      Comments: Mucosal pallor   Cardiovascular:      Rate and Rhythm: Normal rate and regular rhythm.      Pulses: Normal pulses.      Heart sounds: Normal heart sounds.   Pulmonary:      Effort: Pulmonary effort is normal.      Breath sounds: Normal breath sounds. No wheezing, rhonchi or rales.   Abdominal:      General: Abdomen is flat.      Palpations: Abdomen is soft.      Tenderness: There is abdominal tenderness (epigastric tenderness).   Musculoskeletal:         General: Swelling (trace edema bilaterally) present.      Comments: Right foot bandage clean, dry, intact   Skin:     General: Skin is warm and dry.   Neurological:      Mental Status: She is alert.   Psychiatric:         Mood and Affect: Mood normal.         Behavior: Behavior normal.         Pertinent Laboratory/Diagnostic Studies:   The following labs/studies were reviewed please see chart or hospital paperwork for details.  Lab Results   Component Value Date    WBC 17.89 (H) 08/09/2024    HGB 8.0 (L) 08/09/2024    HCT 27.3 (L) 08/09/2024     (H) 08/09/2024     08/09/2024     Lab Results   Component Value Date    SODIUM 145 08/09/2024    K 3.1 (L) 08/09/2024     08/09/2024    CO2 33 (H) 08/09/2024    AGAP 9 08/09/2024    BUN 72 (H) 08/09/2024    CREATININE 1.77 (H) 08/09/2024    GLUC 163 (H) 08/09/2024    GLUF 121 (H) 04/19/2024    CALCIUM 8.3 (L) 08/09/2024    AST 16 08/09/2024    ALT 18 08/09/2024    ALKPHOS 80 08/09/2024    TP 5.1 (L) 08/09/2024    TBILI 1.56 (H)  08/09/2024    EGFR 27 08/09/2024     Lab Results   Component Value Date    HGBA1C 5.9 (H) 07/09/2024     Lab Results   Component Value Date    IRON 21 (L) 07/20/2024    TIBC 116 (L) 07/20/2024           Gillian Mcallister M.D.  [facility] PGY2  8/12/2024, 11:16 AM

## 2024-08-12 NOTE — ASSESSMENT & PLAN NOTE
Patient has history of prior strokes in setting of paroxysmal atrial fibrillation on anticoagulation. Patient was previously on brillinta and aspirin prior to hospitalization, asa was discontinued in setting of melanotic stools and decreasing hemoglobin.    Plan:  Continue brillinta  Continue to monitor daily vitals

## 2024-08-12 NOTE — ASSESSMENT & PLAN NOTE
Patient recently hospitalized from 7/16-8/9 for sepsis in setting of gangrenous right great toe. Patient received IV antibiotics and underwent amputation with podiatry and presumed surgical cure. Blood cultures remained negative however wound cultures grew moranella morganii. Patient WBC elevated to 17 on bloodwork last checked 8/9.    Plan:  Monitor vitals for fevers or tachycardia  Will check WBC tomorrow on CBC  No need for further antibiotics at this time  Local wound care  Follow up with podiatry

## 2024-08-13 ENCOUNTER — TELEPHONE (OUTPATIENT)
Age: 79
End: 2024-08-13

## 2024-08-13 ENCOUNTER — TELEPHONE (OUTPATIENT)
Dept: PODIATRY | Facility: CLINIC | Age: 79
End: 2024-08-13

## 2024-08-13 NOTE — TELEPHONE ENCOUNTER
Caller: Austin Parnell/son    Doctor: Eddie Farooq DPM    Reason for call: Anamaria is in [facility].  They are doing dressing changes only.They are not using betadine or anything else, as it was not ordered by the hospital.  Austin would like to know if they are doing everything right or do orders need to be put in, as this is an open wound. When he was there this morning, he noticed oozing coming from the wound, outside the bandage.  Also, she does not have a post op appointment.  Austin is asking for a call back from the office.    Call back#: [phone]

## 2024-08-13 NOTE — TELEPHONE ENCOUNTER
Patient's son Austin called, he is asking to speak to Usha Gallegos who he spoke to this morning.   He stated his mother had some bloodwork done and he wanted to discuss the results with her.     Please advise, thank you

## 2024-08-13 NOTE — TELEPHONE ENCOUNTER
I did speak with Austin. Left [facility] for Michaela,  and  also spoke with  Acute home care asking if Pt needed to be seen. They were unsure even thought the doctors note said to follow up with podiatry. I will speak with  and go from there tomorrow

## 2024-08-13 NOTE — UTILIZATION REVIEW
NOTIFICATION OF ADMISSION DISCHARGE   This is a Notification of Discharge from [facility]. Please be advised that this patient has been discharge from our facility. Below you will find the admission and discharge date and time including the patient’s disposition.   UTILIZATION REVIEW CONTACT:  Deepali Coleman  Utilization   Network Utilization Review Department  Phone: [phone] x carefully listen to the prompts. All voicemails are confidential.  Email: [email or web address]     ADMISSION INFORMATION  PRESENTATION DATE: 7/19/2024  4:34 PM  OBERVATION ADMISSION DATE: N/A  INPATIENT ADMISSION DATE: 7/19/24  4:34 PM   DISCHARGE DATE: 8/9/2024 12:47 PM   DISPOSITION:Non [facility] SNF/TCU/SNU    Network Utilization Review Department  ATTENTION: Please call with any questions or concerns to [phone] and carefully listen to the prompts so that you are directed to the right person. All voicemails are confidential.   For Discharge needs, contact Care Management DC Support Team at [phone] opt. 2  Send all requests for admission clinical reviews, approved or denied determinations and any other requests to dedicated fax number below belonging to the campus where the patient is receiving treatment. List of dedicated fax numbers for the Facilities:  FACILITY NAME UR FAX NUMBER   ADMISSION DENIALS (Administrative/Medical Necessity) [phone]   DISCHARGE SUPPORT TEAM ([facility]) [phone]   PARENT CHILD HEALTH (Maternity/NICU/Pediatrics) [phone]   [facility] [phone]   [facility] [phone]   [facility] [phone]   [facility] [phone]   [facility] [phone]   [facility] [phone]   [facility] [phone]   [facility]  [phone]   [facility] [phone]   [facility] [phone]   [facility] [phone]   [facility] [phone]

## 2024-08-14 ENCOUNTER — TELEPHONE (OUTPATIENT)
Dept: PODIATRY | Facility: CLINIC | Age: 79
End: 2024-08-14

## 2024-08-14 ENCOUNTER — TELEPHONE (OUTPATIENT)
Age: 79
End: 2024-08-14

## 2024-08-14 ENCOUNTER — NURSING HOME VISIT (OUTPATIENT)
Dept: GERIATRICS | Facility: OTHER | Age: 79
End: 2024-08-14
Payer: COMMERCIAL

## 2024-08-14 VITALS
OXYGEN SATURATION: 97 % | SYSTOLIC BLOOD PRESSURE: 131 MMHG | RESPIRATION RATE: 16 BRPM | TEMPERATURE: 97.8 F | DIASTOLIC BLOOD PRESSURE: 66 MMHG | HEART RATE: 86 BPM

## 2024-08-14 DIAGNOSIS — E87.6 HYPOKALEMIA: ICD-10-CM

## 2024-08-14 DIAGNOSIS — T14.8XXA BLEEDING FROM WOUND: ICD-10-CM

## 2024-08-14 DIAGNOSIS — I50.22 CHRONIC HFREF (HEART FAILURE WITH REDUCED EJECTION FRACTION) (HCC): ICD-10-CM

## 2024-08-14 DIAGNOSIS — N18.4 CKD STAGE 4 SECONDARY TO HYPERTENSION (HCC): ICD-10-CM

## 2024-08-14 DIAGNOSIS — I48.0 PAROXYSMAL ATRIAL FIBRILLATION (HCC): ICD-10-CM

## 2024-08-14 DIAGNOSIS — E11.21 TYPE 2 DIABETES MELLITUS WITH DIABETIC NEPHROPATHY, WITHOUT LONG-TERM CURRENT USE OF INSULIN (HCC): ICD-10-CM

## 2024-08-14 DIAGNOSIS — I10 PRIMARY HYPERTENSION: ICD-10-CM

## 2024-08-14 DIAGNOSIS — A41.9 SEPSIS, DUE TO UNSPECIFIED ORGANISM, UNSPECIFIED WHETHER ACUTE ORGAN DYSFUNCTION PRESENT (HCC): Primary | ICD-10-CM

## 2024-08-14 DIAGNOSIS — K72.00 ACUTE LIVER FAILURE WITHOUT HEPATIC COMA: ICD-10-CM

## 2024-08-14 DIAGNOSIS — I12.9 CKD STAGE 4 SECONDARY TO HYPERTENSION (HCC): ICD-10-CM

## 2024-08-14 DIAGNOSIS — K92.1 MELENA: ICD-10-CM

## 2024-08-14 DIAGNOSIS — I74.09 AORTOILIAC OCCLUSIVE DISEASE (HCC): Primary | ICD-10-CM

## 2024-08-14 PROCEDURE — 99309 SBSQ NF CARE MODERATE MDM 30: CPT | Performed by: NURSE PRACTITIONER

## 2024-08-14 NOTE — TELEPHONE ENCOUNTER
Caller: Selena at [facility]     Doctor: Dr Farooq    Reason for call: Selena is calling in from post acute stating that the patients son wants the patient to be seen with the  relating her right foot amputation that was done on 7/29/24. Please advise as to when the patient can be seen in the office.     Call back#: [phone]

## 2024-08-14 NOTE — ED ATTENDING ATTESTATION
8/14/2024  IKemar DO, saw and evaluated the patient. I have discussed the patient with the resident/non-physician practitioner and agree with the resident's/non-physician practitioner's findings, Plan of Care, and MDM as documented in the resident's/non-physician practitioner's note, except where noted. All available labs and Radiology studies were reviewed.  I was present for key portions of any procedure(s) performed by the resident/non-physician practitioner and I was immediately available to provide assistance.       At this point I agree with the current assessment done in the Emergency Department.  I have conducted an independent evaluation of this patient a history and physical is as follows:    79 yof with AMS, recently discharged to SNF after sepsis associated with toe amputation.     Past Medical History:   Diagnosis Date    Aphasia as late effect of cerebrovascular accident (CVA) 08/16/2023    Arthritis     Chronic kidney disease     Diabetes mellitus (HCC)     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/20/2021    Endometriosis     High cholesterol     History of left common carotid artery stent placement 10/27/2023    Hypertension     Kidney disease, chronic, stage III (moderate, EGFR 30-59 ml/min) (HCC)     Lumbar disc herniation     Median arcuate ligament syndrome (HCC) 11/05/2019    Neuropathy     Obesity (BMI 30-39.9) 12/04/2017    Obesity, morbid (HCC) 01/20/2021    Peripheral vascular disease (HCC)     Pneumonia     Shoulder injury     left    Spinal stenosis     Stroke (HCC) 2015    Memory loss     /74 (BP Location: Left arm)   Pulse 93   Temp 97.8 °F (36.6 °C) (Oral)   Resp 20   LMP  (LMP Unknown)   SpO2 93%   Disoriented w unknown baseline, RRR, CTA, abd soft/NT, dressing placed by vascular here in ED overlying b/l groin wounds.     D/w family, vascular, sepsis eval, CT head. Reevaluate.     ED Course         Critical Care  Time  Procedures

## 2024-08-14 NOTE — ASSESSMENT & PLAN NOTE
History of previous CVAs in the setting of paroxysmal atrial fibrillation she is on anticoagulation  Was previously on both Brilinta and aspirin however aspirin was discontinued in the setting of melena with lower hemoglobin  Continue Brilinta  Vitals daily  Monitor CBC  Heart rate regular and controlled on exam today-continue metoprolol for rate control

## 2024-08-14 NOTE — ASSESSMENT & PLAN NOTE
Lab Results   Component Value Date    EGFR 27 08/09/2024    EGFR 26 08/08/2024    EGFR 31 08/07/2024    CREATININE 1.77 (H) 08/09/2024    CREATININE 1.84 (H) 08/08/2024    CREATININE 1.58 (H) 08/07/2024     Patient noted with RICARDO on CKD in the setting of hypotension during recent hospital stay  Creatinine 1.67, BUN 50, GFR 31 as of yesterday at rehab  Stable  Monitor urinary output  Encourage fluids  Avoid hypotension  Renally dose medications  Monitor BMP

## 2024-08-14 NOTE — ASSESSMENT & PLAN NOTE
Plan  ? Monitor pulses in extremities  ? Continue daily brillinta  ? Follow up with vascular surgery 8/13

## 2024-08-14 NOTE — ASSESSMENT & PLAN NOTE
Patient had shock liver related to anemia and hypotension during recent hospital stay  Liver enzymes normalized prior to discharge back to rehab  Liver enzymes within normal limits on labs yesterday  Total bilirubin elevated 1.77  Repeat CMP to monitor  Patient lethargic on exam does wake up and answer some questions however falls asleep quickly-likely metabolic component causing some encephalopathy PT/OT

## 2024-08-14 NOTE — ASSESSMENT & PLAN NOTE
Wt Readings from Last 3 Encounters:   08/12/24 68 kg (150 lb)   08/09/24 68.1 kg (150 lb 2.1 oz)   07/19/24 67.2 kg (148 lb 2.4 oz)     History of EF 30% in July 2024  Continue metoprolol 25 mg daily  Continue torsemide 10 mg daily  Trace edema noted to bilateral lower extremities  Lungs are clear does not appear in any respiratory distress  Weekly weights

## 2024-08-14 NOTE — ASSESSMENT & PLAN NOTE
Recently hospitalized 7/16/2024 through 8/9/2024 with sepsis in the setting of gangrene right toe  S/p amputation of right great toe  S/p IV antibiotics  Blood cultures negative  Wound cultures grew Morganella morganii I  WBC elevated to 17 during hospital stay  Repeat CBC with a white count of 16  Patient currently afebrile vital signs stable  Patient does appear lethargic on exam-staff states this is how she has been since admission to rehab  Continue local wound care  Outpatient follow-up with podiatry

## 2024-08-14 NOTE — ASSESSMENT & PLAN NOTE
BP acceptable 131/66  Ordered every shift vitals x 5 days to monitor more closely  Continue metoprolol 25 mg daily  Continue torsemide 10 mg daily  Patient was taken off amlodipine and clonidine due to hypotension during recent hospital stay, her metoprolol dosing was also decreased by cardiology

## 2024-08-14 NOTE — Clinical Note
Case was discussed with ** and the patient's admission status was agreed to be Admission Status: inpatient status to the service of   ** .

## 2024-08-14 NOTE — ASSESSMENT & PLAN NOTE
Retorted to been having dark tarry stools with epigastric discomfort  There was concern for slow GI bleed during that her most recent hospitalization  Unfortunately patient was deemed too high risk for endoscopy and was medically managed with IV Protonix drip  Patient was recommended blood transfusions as needed  Hemoglobins have been stable at 8.0  Continue oral PPI twice daily  Monitor CBC  Transfuse for hemoglobin less than 7  Monitor for dark tarry stools  Continue daily iron supplement

## 2024-08-14 NOTE — TELEPHONE ENCOUNTER
Caller: [facility]    Doctor and/or Office: Dr. Farooq/ Jose A    #: [phone]    Escalation: Surgery Calling on behalf of patient Anamaria. The orders state not to change the dressing however the patients family feels that it needs to be changed. Please return call. Thank you

## 2024-08-14 NOTE — ASSESSMENT & PLAN NOTE
Lab Results   Component Value Date    HGBA1C 5.9 (H) 07/09/2024     History of DM type II  A1c below goal of 7  Patient previously on Tradjenta which was discontinued during her recent hospital stay-it was restarted here at rehab  Continue linagliptin 5 mg daily  Continue Lantus 10 units nightly  Continue Accu-Cheks twice a day  Hypoglycemic protocol  Diabetic diet

## 2024-08-14 NOTE — PROGRESS NOTES
[facility]  [address] [address]  [phone]  FACILITY: [facility]  Code 31 (STR)          NAME: Anamaria Parnell  AGE: 79 y.o. SEX: female CODE STATUS: No CPR    DATE OF ENCOUNTER: 8/14/24    Assessment and Plan     1. Sepsis, due to unspecified organism, unspecified whether acute organ dysfunction present (Formerly [facility])  Assessment & Plan:  Recently hospitalized 7/16/2024 through 8/9/2024 with sepsis in the setting of gangrene right toe  S/p amputation of right great toe  S/p IV antibiotics  Blood cultures negative  Wound cultures grew Morganella morganii I  WBC elevated to 17 during hospital stay  Repeat CBC with a white count of 16  Patient currently afebrile vital signs stable  Patient does appear lethargic on exam-staff states this is how she has been since admission to rehab  Continue local wound care  Outpatient follow-up with podiatry   2. Type 2 diabetes mellitus with diabetic nephropathy, without long-term current use of insulin (Formerly [facility])  Assessment & Plan:    Lab Results   Component Value Date    HGBA1C 5.9 (H) 07/09/2024     History of DM type II  A1c below goal of 7  Patient previously on Tradjenta which was discontinued during her recent hospital stay-it was restarted here at rehab  Continue linagliptin 5 mg daily  Continue Lantus 10 units nightly  Continue Accu-Cheks twice a day  Hypoglycemic protocol  Diabetic diet  3. Paroxysmal atrial fibrillation (Formerly [facility])  Assessment & Plan:  History of previous CVAs in the setting of paroxysmal atrial fibrillation she is on anticoagulation  Was previously on both Brilinta and aspirin however aspirin was discontinued in the setting of melena with lower hemoglobin  Continue Brilinta  Vitals daily  Monitor CBC  Heart rate regular and controlled on exam today-continue metoprolol for rate control  4. Primary hypertension  Assessment & Plan:  BP acceptable 131/66  Ordered every shift vitals x 5 days to monitor more closely  Continue metoprolol  25 mg daily  Continue torsemide 10 mg daily  Patient was taken off amlodipine and clonidine due to hypotension during recent hospital stay, her metoprolol dosing was also decreased by cardiology  5. Chronic HFrEF, LVEF 30%, LVIDd 4.6 cm, AHA Stage C  Assessment & Plan:  Wt Readings from Last 3 Encounters:   08/12/24 68 kg (150 lb)   08/09/24 68.1 kg (150 lb 2.1 oz)   07/19/24 67.2 kg (148 lb 2.4 oz)     History of EF 30% in July 2024  Continue metoprolol 25 mg daily  Continue torsemide 10 mg daily  Trace edema noted to bilateral lower extremities  Lungs are clear does not appear in any respiratory distress  Weekly weights  6. Melena  Assessment & Plan:  Retorted to been having dark tarry stools with epigastric discomfort  There was concern for slow GI bleed during that her most recent hospitalization  Unfortunately patient was deemed too high risk for endoscopy and was medically managed with IV Protonix drip  Patient was recommended blood transfusions as needed  Hemoglobins have been stable at 8.0  Continue oral PPI twice daily  Monitor CBC  Transfuse for hemoglobin less than 7  Monitor for dark tarry stools  Continue daily iron supplement  7. CKD stage 4 secondary to hypertension (HCC)  Assessment & Plan:  Lab Results   Component Value Date    EGFR 27 08/09/2024    EGFR 26 08/08/2024    EGFR 31 08/07/2024    CREATININE 1.77 (H) 08/09/2024    CREATININE 1.84 (H) 08/08/2024    CREATININE 1.58 (H) 08/07/2024     Patient noted with RICARDO on CKD in the setting of hypotension during recent hospital stay  Creatinine 1.67, BUN 50, GFR 31 as of yesterday at rehab  Stable  Monitor urinary output  Encourage fluids  Avoid hypotension  Renally dose medications  Monitor BMP  8. Hypokalemia  Assessment & Plan:  K+ 2.8 on 8/13  Started on potassium supplements  Repeat CMP  9. Acute liver failure without hepatic coma  Assessment & Plan:  Patient had shock liver related to anemia and hypotension during recent hospital stay  Liver  enzymes normalized prior to discharge back to rehab  Liver enzymes within normal limits on labs yesterday  Total bilirubin elevated 1.77  Repeat CMP to monitor  Patient lethargic on exam does wake up and answer some questions however falls asleep quickly-likely metabolic component causing some encephalopathy PT/OT  10. Bleeding from wound  Assessment & Plan:  Notified by nursing that patient's right groin wound had a large clot present during wound vac dressing change  Nursing concerned about replacing suction with clot present  Discussed with vascular- Ok to replace wound vac   Monitor for bleeding  Later in day nursing noted a saturated bed linen and bleeding from right groin wound  Recommended sending to [facility] ED at request of Vascular team        All medications and routine orders were reviewed and updated as needed.    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/20/2021    Endometriosis     High cholesterol     History of left common carotid artery stent placement 10/27/2023    Hypertension     Kidney disease, chronic, stage III (moderate, EGFR 30-59 ml/min) (HCC)     Lumbar disc herniation     Median arcuate ligament syndrome (HCC) 11/05/2019    Neuropathy     Obesity (BMI 30-39.9) 12/04/2017    Obesity, morbid (HCC) 01/20/2021    Peripheral vascular disease (HCC)     Pneumonia     Shoulder injury     left    Spinal stenosis     Stroke (HCC) 2015    Memory loss     Past Surgical History:   Procedure Laterality Date    ARTERIOGRAM Bilateral 7/24/2024    Procedure: ARTERIOGRAM;  Surgeon: Jose Crum DO;  Location: [facility];  Service: Vascular    CARDIAC CATHETERIZATION      CAROTID STENT Left 9/7/2023     Procedure: L TCAR;  Surgeon: Nicole Gallo MD;  [facility];  Service: Vascular    COLONOSCOPY      FL RETROGRADE PYELOGRAM  4/6/2020    FRACTURE SURGERY Right     ankle    IR CAROTID STENT  9/7/2023    IR LOWER EXTREMITY ANGIOGRAM  7/24/2024    OVARIAN CYST SURGERY      VT CYSTO BLADDER W/URETERAL CATHETERIZATION Bilateral 4/6/2020    Procedure: CYSTOSCOPY WITH RETROGRADE PYELOGRAM;  Surgeon: Robert Mitchell MD;  Location: [facility];  Service: Urology    VT CYSTO W/INSERT URETERAL STENT Right 4/6/2020    Procedure: INSERTION STENT URETERAL;  Surgeon: Robert Mithcell MD;  [facility];  Service: Urology    VT CYSTO W/REMOVAL OF TUMORS SMALL N/A 4/6/2020    Procedure: TRANSURETHRAL RESECTION OF BLADDER TUMOR (TURBT);  Surgeon: Robert Mitchell MD;  [facility];  Service: Urology    VT TEAEC W/WO PATCH GRAFT COMMON FEMORAL Bilateral 7/24/2024    Procedure: Bilateral femoral endarterectomies with patch angioplasty; Retrograde iliac intervention, shockwave, stent placement and angioplasty; femoral to femoral bypass with PTFE;  Surgeon: Jose Crum DO;  Location: [facility];  Service: Vascular    ROTATOR CUFF REPAIR Left     TOE AMPUTATION Right 7/29/2024    Procedure: Rigth Hallux amputation, partial 1st ray resection;  Surgeon: Eddie Farooq DPM;  Location: [facility];  Service: Podiatry    TONSILLECTOMY       Allergies   Allergen Reactions    Fenofibrate Other (See Comments)      blood in urine  hx  Kidney Failure    Colesevelam Other (See Comments)      leg pains    Colestipol Itching and Other (See Comments)      Swelling lower legs    Ezetimibe GI Intolerance    Statins Myalgia          History of Present Illness     Anamaria Parnell is a 79 y.o. female admitted to [facility] following hospital stay for Sepsis. Patient has a past medical Hx including but not limited to PAD, HTN, DM, CHF, CKD4, AFIB, Ambulatory dysfunction, Depression. Patient is seen  in collaboration with nursing for medical mgmt and STR follow up.     Patient initially presented to [facility] for sepsis source thought to be from toe ulcer which was gangrenous.  Patient was then transferred to [facility] and underwent bilateral balloon angioplasty with left lower extremity stenting.  Podiatry on case and also performed a partial amputation of the first ray which was done 7/29/2024.  Intraoperative cultures grew Morganella Morganella and patient was treated with antibiotics.  Hospital course was complicated by transaminitis thought to be due to transient hypotension.  Patient was evaluated by cardiology for tachybradycardia syndrome in the setting of paroxysmal A-fib with baseline left bundle branch block and first-degree AV block.  Patient's aspirin was discontinued however she was continued on Brilinta her symptoms did improve with dose adjustment of her home beta-blocker.  Patient also had developed melena with anemia.  She was deemed too high risk for endoscopy and was managed conservatively by gastroenterology with IV Protonix drip and recommended transfusions if hemoglobin drops below 7.  Patient eventually became medically stable enough to be transferred to short-term rehab.    Seen and examined at bedside today. Patient is a poor historian.  Patient is very lethargic on exam difficult to arouse.  She is able to tell me that she feels very tired but does not offer any other complaints.  Patient cooperative for exam but unable to give much history.  She is alert to self only, she does know she is in [place] but did not know what building she was in.  She did not answer the year.  Nursing states that they have been giving her pain medication for her chronic back pain, patient is lying comfortably in bed on exam and when asked if she had pain she denied any.  Nursing have concerns about her bilateral groin wounds.  VAC dressing was changed today and right groin wound  noted with blood clot.  Later in the day patient developed bleeding from right groin and after discussions with patient's vascular team it was decided patient should be sent to [facility] for evaluation.        The patient's allergies, past medical, surgical, social and family history were reviewed and unchanged.    Review of Systems     Review of Systems   Unable to perform ROS: Acuity of condition         Objective     Vitals:   Vitals:    08/14/24 1556   BP: 131/66   Pulse: 86   Resp: 16   Temp: 97.8 °F (36.6 °C)   SpO2: 97%         Physical Exam  Vitals and nursing note reviewed.   Constitutional:       General: She is not in acute distress.     Appearance: Normal appearance. She is ill-appearing.   HENT:      Head: Normocephalic and atraumatic.      Nose: No congestion or rhinorrhea.      Mouth/Throat:      Mouth: Mucous membranes are moist.   Eyes:      General: No scleral icterus.     Conjunctiva/sclera: Conjunctivae normal.      Pupils: Pupils are equal, round, and reactive to light.   Cardiovascular:      Rate and Rhythm: Normal rate and regular rhythm.      Pulses: Normal pulses.      Heart sounds: Normal heart sounds. No murmur heard.  Pulmonary:      Effort: Pulmonary effort is normal. No respiratory distress.      Breath sounds: Normal breath sounds. No wheezing, rhonchi or rales.   Abdominal:      General: Bowel sounds are normal. There is no distension.      Palpations: Abdomen is soft. There is no mass.      Tenderness: There is no abdominal tenderness.      Hernia: No hernia is present.   Musculoskeletal:         General: No swelling or tenderness.   Lymphadenopathy:      Cervical: No cervical adenopathy.   Skin:     General: Skin is warm and dry.      Coloration: Skin is not pale.      Findings: No rash.      Comments: Bilateral groin wounds covered with dry sterile dressing clean dry and intact on initial exam  Right groin began bleeding later in the afternoon  Right toe dressing intact    Neurological:      General: No focal deficit present.      Mental Status: She is alert. Mental status is at baseline. She is disoriented.      GCS: GCS eye subscore is 3. GCS verbal subscore is 4. GCS motor subscore is 6.      Motor: Weakness present.      Gait: Gait abnormal.   Psychiatric:         Mood and Affect: Mood normal.         Behavior: Behavior normal.         Pertinent Laboratory/Diagnostic Studies:     Reviewed in facility chart      Current Medications   Medications reviewed and updated see facility MAR for details.    No current facility-administered medications for this visit.  No current outpatient medications on file.    Facility-Administered Medications Ordered in Other Visits:     acetaminophen (TYLENOL) tablet 650 mg, 650 mg, Oral, Q6H PRN, Philpi Lopez DO    allopurinol (ZYLOPRIM) tablet 100 mg, 100 mg, Oral, Daily, Philip Lopez DO    [START ON 8/16/2024] apixaban (ELIQUIS) tablet 5 mg, 5 mg, Oral, BID, Philip Lopez DO    cefTRIAXone (ROCEPHIN) 1,000 mg in dextrose 5 % 50 mL IVPB, 1,000 mg, Intravenous, Q24H, Gabby Roberts MD, Last Rate: 100 mL/hr at 08/15/24 1811, 1,000 mg at 08/15/24 1811    escitalopram (LEXAPRO) tablet 20 mg, 20 mg, Oral, Daily, Philip Lopez DO    insulin detemir (LEVEMIR) subcutaneous injection 10 Units, 10 Units, Subcutaneous, Daily, Philip Lopez DO    insulin lispro (HumALOG/ADMELOG) 100 units/mL subcutaneous injection 1-5 Units, 1-5 Units, Subcutaneous, Q6H JAVIER **AND** Fingerstick Glucose (POCT), , , Q6H, Silver Barron PA-C    metoprolol succinate (TOPROL-XL) 24 hr tablet 25 mg, 25 mg, Oral, Daily, Philip Lopez DO    pantoprazole (PROTONIX) EC tablet 40 mg, 40 mg, Oral, BID, Philip Lopez DO    pravastatin (PRAVACHOL) tablet 80 mg, 80 mg, Oral, Daily With Dinner, Philip Lopez DO    sodium bicarbonate tablet 650 mg, 650 mg, Oral, BID after meals, Philip Lopez DO    ticagrelor (BRILINTA) tablet 90 mg, 90 mg, Oral, Q12H JAVIER, Philip Lopez, DO  "    Please note:  Voice-recognition software may have been used in the preparation of this document.  Occasional wrong word or \"sound-alike\" substitutions may have occurred due to the inherent limitations of voice recognition software.  Interpretation should be guided by context.       GRACIELA Moreau     "

## 2024-08-15 PROBLEM — A41.9 SEVERE SEPSIS (HCC): Status: ACTIVE | Noted: 2024-01-01

## 2024-08-15 PROBLEM — G93.41 ACUTE METABOLIC ENCEPHALOPATHY: Status: ACTIVE | Noted: 2024-01-01

## 2024-08-15 PROBLEM — T14.8XXA BLEEDING FROM WOUND: Status: ACTIVE | Noted: 2024-01-01

## 2024-08-15 PROBLEM — R65.20 SEVERE SEPSIS (HCC): Status: ACTIVE | Noted: 2024-01-01

## 2024-08-15 NOTE — ASSESSMENT & PLAN NOTE
CT head without acute intracranial abnormality.  Chronic left PCA territory infarct, stable  Noted severe sepsis with pneumonia and UA pending  Patient additionally has baseline memory issues and son reports she has been increasingly somnolent similar to today since prior hospitalization  Treatment of severe sepsis  Delirium protocol

## 2024-08-15 NOTE — ASSESSMENT & PLAN NOTE
CT imaging with evidence of GIST  Reviewed last prolonged hospitalization in which patient was noted to have melena and it does appear that this GIST was noted on CTA imaging on 7/18; does also appear from notes that the GI team who was seeing patient during last hospitalization.   Consider GI team consultation this hospitalization

## 2024-08-15 NOTE — ASSESSMENT & PLAN NOTE
CT imaging with evidence of GIST  Reviewed last prolonged hospitalization in which patient was noted to have melena and it does appear that this GIST was noted on CTA imaging on 7/18; does also appear from notes that the GI team who was seeing patient during last hospitalization.   Consider GI team consultation in the a.m.

## 2024-08-15 NOTE — ASSESSMENT & PLAN NOTE
"In the setting of suspected severe sepsis as above  CT head on admission revealed, \"No acute intracranial abnormality. Chronic left PCA territory infarct, stable.\"  Patient caldera reported baseline memory issues and son reported to admitting provider that she has been increasingly somnolent since prior hospitalization  Delirium precautions  "

## 2024-08-15 NOTE — ED NOTES
Pt transported to CT.     Madeleine Vieyra RN  08/14/24 2035       Madeleine Vieyra RN  08/14/24 2039       Madeleine Vieyra RN  08/14/24 2039

## 2024-08-15 NOTE — CONSULTS
Consultation - Palliative Care   Anamaria Parnell 79 y.o. female MRN: [id number]  Unit/Bed#: QCD Encounter: [id number]      Assessment & Plan   Patient Active Problem List   Diagnosis    Basilar artery stenosis    H/o CVA    Chronic GERD    Acute renal failure superimposed on stage 3 chronic kidney disease (HCC)    Claudication in peripheral vascular disease (HCC)    Type 2 diabetes mellitus with diabetic nephropathy (HCC)    Gout    Hx-TIA (transient ischemic attack)    Hypercholesteremia    Hyperlipidemia associated with type 2 diabetes mellitus  (HCC)    Hypertension    Hypertension associated with diabetes  (HCC)    Osteoarthritis    Right renal artery stenosis (HCC)    Vertebrobasilar artery syndrome    Vitamin D deficiency    Statin intolerance    Lumbar disc herniation    Spondylosis of lumbar region without myelopathy or radiculopathy    Aortoiliac stenosis, left (HCC)    Hypokalemia    Lumbosacral spondylosis without myelopathy    Hyperlipidemia, unspecified    Herniated lumbar intervertebral disc    Celiac artery stenosis (HCC) >70% stenosis in the celiac trunk    Abnormal CT of the chest suspicious for an infectious or inflammatory bronchiolitis.    Positive cardiac stress test  small, mildly severe, partially reversible myocardial perfusion defect of anterior and inferior wall     Femoral artery stenosis, right (HCC) 50-75% stenosis in the common femoral artery.    Mesenteric artery stenosis (HCC)    Atherosclerosis of native arteries of right leg with ulceration of other part of foot (HCC)    Symptomatic carotid artery stenosis, left    Pulmonary nodule 7 mm groundglass opacity in the right upper lobe, unchanged since October 2019    Stenosis of left anterior descending artery Mid LAD 50-60% stenosis    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    Tachy-jeff syndrome (HCC)    Malignant neoplasm of overlapping sites of bladder (HCC)    Cervical  radiculopathy    Acute renal failure (ARF) (HCC)    Hypertensive kidney disease with stage 4 chronic kidney disease (HCC)    Depression, recurrent (HCC)    Type 2 diabetes mellitus, without long-term current use of insulin (HCC)    Type 2 diabetes mellitus with diabetic peripheral angiopathy without gangrene (HCC)    Gastrointestinal stromal tumor (GIST) (HCC)    History of gastrointestinal stromal tumor (GIST)    Secondary hyperparathyroidism of renal origin (HCC)    Aortoiliac occlusive disease (HCC)    Dysarthria    CKD stage 4 secondary to hypertension (HCC)    Primary hypertension    Smoking    Renal cyst    Encounter to discuss test results    Complex renal cyst    Encounter for follow-up surveillance of urothelial carcinoma of lower urinary tract    Carotid stenosis, asymptomatic, right    Diabetic ulcer of toe of right foot associated with diabetes mellitus due to underlying condition, limited to breakdown of skin (HCC)    Sepsis (HCC)    Cellulitis of toe of right foot    Coronary artery disease of native artery of native heart with stable angina pectoris (HCC)    Paroxysmal atrial fibrillation (HCC)    Transaminitis    Pulmonary hypertension (HCC)    Chronic HFrEF, LVEF 30%, LVIDd 4.6 cm, AHA Stage C    Encephalopathy    Elevated troponin    Acute liver failure without hepatic coma    Acute blood loss anemia    Melena    Patient refused evaluation    Severe sepsis (HCC)    Acute metabolic encephalopathy     Active issues specifically addressed today include:   - SIRS v sepsis   - acute toxic/metabolic encephalopathy   - s/p R hallux amputation [07/29/2024]   - chronic limb ischemia s/p b/l femoral endarterectomy with left to right femorofemoral PTFE bypass and retrograde left JACI, left EIA stenting, and bilateral groin VAC placement [07/24/2024]   - HFrEF, EF 30%   - CKD stage III   - pAF   - goals of care support    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    #goals of care   - spoke with son [HCA] via telephone  - son requested Palliative Care consultation with communicated goal to set up cares at home for patient with focus of care on comfort-oriented cares   - differentiated philosophy differentiating Palliative Care and Hospice Care   - son communicates insight/understanding into patient's continued functional/cognitive decline in the setting of significant/diffuse vascular disease leading to chronic limb ischemia -> R hallux dry gangrene s/p R hallux amputation [07/29/2024] + GIST.    - son feels that given significant comorbid disease burden with limitation on escalation of care, which includes no further surgical or invasive interventions; no \"heroic\" measures. Expanded discussion regarding resuscitative supports, including CPR/ACLS and intubation/mechanical ventilation -> confirmed DNAR/DNI which is consistent with patient's previously completed Five Wishes [see excerpt below].   - reports bad experience with previous discharge to post-acute rehab, felt that expectations for participation were far too high for the patient. No further desire to pursue placement at either STR and/or SNF and/or LTACH.    - no further hospitalizations desired, as understanding of patient's previously communicated wishes was to reach natural death at home and not within the hospital/facility. Son wishes to honor this wish.   - briefly introduced hospice model of care, including coverage of Medicare Benefit v out-of-pocket expenses, which would include paid caregiver supports and room/board for SNF. All questions/concerns addressed.   - given plan of care in alignment with hospice model of care, referral for CM/hospice placed.   - discussed FMLA through current employer to allow for son to provide 24/7 care -> son to discuss with HR department to initiate FMLA process.   - will continue " to follow up for continued supportive cares.    #ACP Documentation   - Five Wishes document completed, on file                #psychosocial support   - emotional support provided   -    -  10/23/2022   - one adult child   - Austin [son] [phone]   - resides with son   - 3 siblings [2 brothers, 1 sister]   - previously worked for [employer] 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 We will continue to follow. Please do not hesitate to contact our on-call provider through our clinic answering service at [phone] should you have acute symptom control concerns.      Controlled Substance Review    [place]MP or [place]  reviewed: No red flags were identified; safe to proceed with prescription..    PDMP Review         Value Time User    PDMP Reviewed  Yes 8/15/2024  8:15 AM Wili Adame MD            History of Present Illness   Physician Requesting Consult: Shaka Moser DO  Reason for Consult / Principal Problem: supportive cares, family request  Hx and PE limited by: acute encephalopathy, non-verbal  HPI: Anamaria Parnell is a 79 y.o. year old female with a PMH of significant vascular disease [i.e. aortoiliac occlusive disease, PVD, chronic limb ischemia] s/p b/l femoral endarterectomy with left to right femorofemoral PTFE bypass and retrograde left JACI, left EIA stenting, and bilateral groin VAC placement [on 2024], HFrEF, late effects of CVA, CKD stage III who was sent from [facility] for evaluation of progressive/persistent lethargy.    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 However, per son,  the patient was unable to effectively participate and became increasingly more confused and weaker.    Initial labs significant for Na 148, K 3.2, corrected Ca 8.8, Mg 2.0, BUN/SCr 47/1.51 [eGFR 33]; AST/ALT 13/9, albumin 2.6, TBili 1.65. NH3 18. WBC 16.5, Hgb 7.1, Plt 221. Lactate 2.1->2.1. Procalcitonin 0.52. hs-cTn 218->149->209. INR 2.2. pTT 30. UA wnl. BCx pending. MRSA pending.    CTH [08/14/2024] chronic L PCA territory infarct; no acute findings.    CTA Dissection CAP [08/14/2024] no PE; no thoracic aortic dissection; multifocal ground glass densities bilateral lungs, may be due to PNA; small R pleural effusion, small L pleural effusion; no ascites; no pelvic abscess; linear hypodensity posterior aspect of graft, suggestive of non-occlusive thrombus; exophytic mass from the stomach, measuring 2.5 x 5.1 cm, c/w GIST.    TTE [07/31/2024] LVEF 30%, moderate/severely reduced LV systolic function, moderate global hypokinesis, the entire inferoseptal wall and mid to apical inferior wall is severely hypokinetic; mild/moderate MR; mild TR; ascending aorta is 3.4 cm; no pericardial effusion.     Patient was minimally interactive and unable to provide history. History provided via review of the medical records, discussion with SLIM, and discussion with son via telephone.      Inpatient consult to Palliative Care  Consult performed by: Wili Adame MD  Consult ordered by: Philip Lopez DO          Review of Systems   Unable to perform ROS: Mental status change       Historical Information   Past Medical History:   Diagnosis Date    Aphasia as late effect of cerebrovascular accident (CVA) 08/16/2023    Arthritis     Chronic kidney disease     Diabetes mellitus (HCC)     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/20/2021    Endometriosis     High cholesterol     History of left common carotid artery stent placement 10/27/2023    Hypertension     Kidney disease, chronic, stage III  (moderate, EGFR 30-59 ml/min) ([facility])     Lumbar disc herniation     Median arcuate ligament syndrome ([facility]) 11/05/2019    Neuropathy     Obesity (BMI 30-39.9) 12/04/2017    Obesity, morbid ([facility]) 01/20/2021    Peripheral vascular disease ([facility])     Pneumonia     Shoulder injury     left    Spinal stenosis     Stroke ([facility]) 2015    Memory loss     Past Surgical History:   Procedure Laterality Date    ARTERIOGRAM Bilateral 7/24/2024    Procedure: ARTERIOGRAM;  Surgeon: Jose Crum DO;  [facility];  Service: Vascular    CARDIAC CATHETERIZATION      CAROTID STENT Left 9/7/2023    Procedure: L TCAR;  Surgeon: Nicole Gallo MD;  [facility];  Service: Vascular    COLONOSCOPY      FL RETROGRADE PYELOGRAM  4/6/2020    FRACTURE SURGERY Right     ankle    IR CAROTID STENT  9/7/2023    IR LOWER EXTREMITY ANGIOGRAM  7/24/2024    OVARIAN CYST SURGERY      SC CYSTO BLADDER W/URETERAL CATHETERIZATION Bilateral 4/6/2020    Procedure: CYSTOSCOPY WITH RETROGRADE PYELOGRAM;  Surgeon: Robert Mitcehll MD;  [facility];  Service: Urology    SC CYSTO W/INSERT URETERAL STENT Right 4/6/2020    Procedure: INSERTION STENT URETERAL;  Surgeon: Robert Mitchell MD;  [facility];  Service: Urology    SC CYSTO W/REMOVAL OF TUMORS SMALL N/A 4/6/2020    Procedure: TRANSURETHRAL RESECTION OF BLADDER TUMOR (TURBT);  Surgeon: Robert Mitchell MD;  [facility];  Service: Urology    SC TEAEC W/WO PATCH GRAFT COMMON FEMORAL Bilateral 7/24/2024    Procedure: Bilateral femoral endarterectomies with patch angioplasty; Retrograde iliac intervention, shockwave, stent placement and angioplasty; femoral to femoral bypass with PTFE;  Surgeon: Jose Crum DO;  [facility];  Service: Vascular    ROTATOR CUFF REPAIR Left     TOE AMPUTATION Right 7/29/2024    Procedure: Rigth Hallux amputation, partial 1st ray resection;  Surgeon: Eddie Farooq DPM;  [facility];  Service: Podiatry     TONSILLECTOMY       E-Cigarette/Vaping    E-Cigarette Use Never User      E-Cigarette/Vaping Substances    Nicotine No     THC No     CBD No     Flavoring No     Other No     Unknown No      Social History     Socioeconomic History    Marital status: /Civil Union     Spouse name: None    Number of children: None    Years of education: None    Highest education level: None   Occupational History    Occupation: retired   Tobacco Use    Smoking status: Former     Current packs/day: 0.00     Average packs/day: 2.0 packs/day for 54.6 years (109.1 ttl pk-yrs)     Types: Cigarettes     Start date: 1966     Quit date: 2020     Years since quittin.0    Smokeless tobacco: Never   Vaping Use    Vaping status: Never Used   Substance and Sexual Activity    Alcohol use: Never    Drug use: Never    Sexual activity: Not Currently     Partners: Male   Other Topics Concern    None   Social History Narrative    Occasional Caffeine Consumption     Social Determinants of Health     Financial Resource Strain: Not on file   Food Insecurity: No Food Insecurity (2024)    Hunger Vital Sign     Worried About Running Out of Food in the Last Year: Never true     Ran Out of Food in the Last Year: Never true   Transportation Needs: No Transportation Needs (2024)    PRAPARE - Transportation     Lack of Transportation (Medical): No     Lack of Transportation (Non-Medical): No   Physical Activity: Not on file   Stress: Not on file   Social Connections: Not on file   Intimate Partner Violence: Not on file   Housing Stability: Low Risk  (2024)    Housing Stability Vital Sign     Unable to Pay for Housing in the Last Year: No     Number of Times Moved in the Last Year: 0     Homeless in the Last Year: No     Family History   Problem Relation Age of Onset    Hypertension Mother     Heart disease Mother         Valvular    Hyperlipidemia Mother     Hypertension Father     Heart defect Father         Cardiomegaly     Stroke Sister         Cerebrovascular Accident    Arthritis Brother     Other Brother         Back Disorder       Meds/Allergies   current meds:   Current Facility-Administered Medications   Medication Dose Route Frequency    acetaminophen (TYLENOL) tablet 650 mg  650 mg Oral Q6H PRN    allopurinol (ZYLOPRIM) tablet 100 mg  100 mg Oral Daily    [START ON 8/16/2024] apixaban (ELIQUIS) tablet 5 mg  5 mg Oral BID    escitalopram (LEXAPRO) tablet 20 mg  20 mg Oral Daily    insulin detemir (LEVEMIR) subcutaneous injection 10 Units  10 Units Subcutaneous Daily    insulin lispro (HumALOG/ADMELOG) 100 units/mL subcutaneous injection 1-5 Units  1-5 Units Subcutaneous TID AC    insulin lispro (HumALOG/ADMELOG) 100 units/mL subcutaneous injection 1-5 Units  1-5 Units Subcutaneous HS    metoprolol succinate (TOPROL-XL) 24 hr tablet 25 mg  25 mg Oral Daily    pantoprazole (PROTONIX) EC tablet 40 mg  40 mg Oral BID    piperacillin-tazobactam (ZOSYN) 4.5 g in sodium chloride 0.9 % 100 mL IVPB (EXTENDED INFUSION)  4.5 g Intravenous Q8H    pravastatin (PRAVACHOL) tablet 80 mg  80 mg Oral Daily With Dinner    sodium bicarbonate tablet 650 mg  650 mg Oral BID after meals    ticagrelor (BRILINTA) tablet 90 mg  90 mg Oral Q12H JAVIER         Allergies   Allergen Reactions    Fenofibrate Other (See Comments)      blood in urine  hx  Kidney Failure    Colesevelam Other (See Comments)      leg pains    Colestipol Itching and Other (See Comments)      Swelling lower legs    Ezetimibe GI Intolerance    Statins Myalgia       Objective     Physical Exam  Vitals and nursing note reviewed.   Constitutional:       Appearance: She is not diaphoretic.      Comments: Chronically ill appearing  FLACC 0 points  BMI 25.1   HENT:      Head: Normocephalic and atraumatic.      Right Ear: External ear normal.      Left Ear: External ear normal.      Nose: No rhinorrhea.   Eyes:      Comments: No gaze preference   Cardiovascular:      Rate and Rhythm:  Tachycardia present.   Pulmonary:      Effort: No tachypnea or respiratory distress.   Genitourinary:     Comments: External urinary catheter in place  Skin:     Coloration: Skin is pale.   Neurological:      Mental Status: She is lethargic.      Comments: Minimally responsive   Psychiatric:      Comments: Unable to assess         Lab Results:   I have personally reviewed pertinent labs., CBC:   Lab Results   Component Value Date    WBC 16.48 (H) 08/15/2024    HGB 7.1 (L) 08/15/2024    HCT 25.1 (L) 08/15/2024     (H) 08/15/2024     08/15/2024    RBC 2.48 (L) 08/15/2024    MCH 28.6 08/15/2024    MCHC 28.3 (L) 08/15/2024    RDW 21.7 (H) 08/15/2024    MPV 11.7 08/15/2024    NRBC 1 08/15/2024   , CMP:   Lab Results   Component Value Date    SODIUM 148 (H) 08/15/2024    K 3.2 (L) 08/15/2024     08/15/2024    CO2 33 (H) 08/15/2024    BUN 47 (H) 08/15/2024    CREATININE 1.51 (H) 08/15/2024    CALCIUM 7.5 (L) 08/15/2024    AST 13 08/15/2024    ALT 9 08/15/2024    ALKPHOS 86 08/15/2024    EGFR 33 08/15/2024   , PT/PTT:  Lab Results   Component Value Date    PTT 30 08/15/2024     Imaging Studies: I have personally reviewed pertinent reports.    EKG, Pathology, and Other Studies: I have personally reviewed pertinent reports.      Code Status: Level 3 - DNAR and DNI  Advance Directive and Living Will:  Completed, on file  Power of :  No  POLST:  No    Counseling / Coordination of Care  Total floor / unit time spent today 45 minutes. Greater than 50% of total time was spent with the patient and / or family counseling and / or coordination of care. A description of the counseling / coordination of care: provided medical updates, discussed palliative care, determined competency, determined goals of care, determined POA, determined social/family support, discussed plans of care, discussed symptom management, provided psychosocial support. Telephone discussion with son. PDMP Reviewed. Coordinated plan of  care with primary team.

## 2024-08-15 NOTE — ASSESSMENT & PLAN NOTE
Recent hospitalization for bilateral femoral endarterectomy with bypass and JACI/EIA stenting as well as right first toe amputation  Evaluated by vascular surgery and lower suspicion for infection related to recent surgery  Appreciate vascular team assistance while following  Continue Brilinta as well as statin

## 2024-08-15 NOTE — SEPSIS NOTE
"  Sepsis Note   Anamaria Parnell 79 y.o. female MRN: [id number]  Unit/Bed#: QCD Encounter: [id number]       Initial Sepsis Screening       Row Name 08/14/24 2056                Is the patient's history suggestive of a new or worsening infection? Yes (Proceed)  -NV        Suspected source of infection suspect infection, source unknown  -NV        Indicate SIRS criteria Hyperthemia > 38.3C (100.9F) OR Hypothermia <36C (96.8F);Leukocytosis (WBC > 71088 IJL) OR Leukopenia (WBC <4000 IJL) OR Bandemia (WBC >10% bands)  -NV        Are two or more of the above signs & symptoms of infection both present and new to the patient? Yes (Proceed)  -NV        Assess for evidence of organ dysfunction: Are any of the below criteria present within 6 hours of suspected infection and SIRS criteria that are NOT considered to be chronic conditions? Lactate > 2.0  -NV        Date of presentation of severe sepsis 08/14/24  -NV        Time of presentation of severe sepsis 2056  -NV        Sepsis Note: Click \"NEXT\" below (NOT \"close\") to generate sepsis note based on above information. YES (proceed by clicking \"NEXT\")  -NV                  User Key  (r) = Recorded By, (t) = Taken By, (c) = Cosigned By      Initials Name Provider Type    NV Shawna Oconnor MD Resident                        There is no height or weight on file to calculate BMI.  Wt Readings from Last 1 Encounters:   08/12/24 68 kg (150 lb)        Ideal body weight: 47.1 kg (103 lb 14.5 oz)  Adjusted ideal body weight: 55.5 kg (122 lb 5.5 oz)    "

## 2024-08-15 NOTE — PROGRESS NOTES
"[facility]  Progress Note  Name: Anamaria Parnell I  MRN: [id number]  Unit/Bed#: PPHP 726-01 I Date of Admission: 8/14/2024   Date of Service: 8/15/2024 I Hospital Day: 1    Assessment & Plan   * Severe sepsis (HCC)  Assessment & Plan  POA aeb tachycardia, leukocytosis, and elevated lactic acid level  Imaging concern for possible pneumonia  Procal elevated on admission. Trend  Leukocytosis improving. Trend  Currently on IV Zosyn  Blood cultures pending  ID consulted  Palliative care consult pending as well  NPO pending SLP eval    Acute metabolic encephalopathy  Assessment & Plan  In the setting of suspected severe sepsis as above  CT head on admission revealed, \"No acute intracranial abnormality. Chronic left PCA territory infarct, stable.\"  Patient caldera reported baseline memory issues and son reported to admitting provider that she has been increasingly somnolent since prior hospitalization  Delirium precautions    Chronic HFrEF, LVEF 30%, LVIDd 4.6 cm, AHA Stage C  Assessment & Plan  Wt Readings from Last 3 Encounters:   08/15/24 56.4 kg (124 lb 5.4 oz)   08/12/24 68 kg (150 lb)   08/09/24 68.1 kg (150 lb 2.1 oz)     Most recent echo in July 2024 with LVEF 30% which was down from echo in 2023  On PTA torsemide 10 mg daily  Although noted pulmonary venous congestion on CT imaging, patient with severe sepsis as above, received 500cc Isolyte bolus  Continue holding torsemide for now          Aortoiliac occlusive disease (HCC)  Assessment & Plan  Recent hospitalization for bilateral femoral endarterectomy with bypass and JACI/EIA stenting as well as right first toe amputation  Was evaluated by Vascular Surgery on admission who did not feel that wound infection was present  Continue Brilinta as well as statin    CKD stage 4 secondary to hypertension (HCC)  Assessment & Plan  Lab Results   Component Value Date    EGFR 33 08/15/2024    EGFR 31 08/14/2024    EGFR 27 08/09/2024    " "CREATININE 1.51 (H) 08/15/2024    CREATININE 1.59 (H) 08/14/2024    CREATININE 1.77 (H) 08/09/2024     Creatinine at baseline and continue to monitor    Acute blood loss anemia  Assessment & Plan  During prior hospitalization noted to have melena as well as anemia requiring transfusion  Hemoglobin 7.1 today, down from 8.4 on admission. This in the setting of IVF bolus  Appears baseline in the 8-9 range    Paroxysmal atrial fibrillation (HCC)  Assessment & Plan  Continue PTA metoprolol as well as Eliquis    Gastrointestinal stromal tumor (GIST) (HCC)  Assessment & Plan  CT imaging with evidence of GIST  Reviewed last prolonged hospitalization in which patient was noted to have melena and it does appear that this GIST was noted on CTA imaging on 7/18; does also appear from notes that the GI team who was seeing patient during last hospitalization.   Consider GI team consultation this hospitalization    Hypokalemia  Assessment & Plan  Replete and monitor    H/o CVA  Assessment & Plan  CT head as above  On eliquis, Brilinta, and statin    Type 2 diabetes mellitus with diabetic nephropathy (HCC)  Assessment & Plan  Lab Results   Component Value Date    HGBA1C 5.9 (H) 07/09/2024       Recent Labs     08/14/24  2259 08/15/24  0756   POCGLU 173* 176*       Blood Sugar Average: Last 72 hrs:  (P) 174.5    Controlled based on recent A1c  AM Lantus held   Diet per SLP recs  Continue glucose checks and SSI TID AC and QHS    Elevated troponin  Assessment & Plan  Suspect non-MI troponin elevation in the setting of severe sepsis         [facility] Internal Medicine Post Admit Check:     Date of visit: 08/15/24    The patient was admitted earlier to the [facility] Internal Medicine Service.  Please see initial intake history and physical for detailed admission history.  This is a supplemental update following a post admit checkup.    Subjective: Ms. Parnell denies any pain. She reports she feels \"fine\".     Exam:   Gen: elderly, " chronically ill appearing female, lying in bed, NAD  CV: RRR  Pulm: CTA b/l  GI: soft non-tender abdomen  Neuro: sonolent but awakens easily when her name is called. Oriented to self. Did not answer additional orientation questions  Psych: not anxious or agitated appearing    Assessment and Plan:   See above    Silver Barron PA-C

## 2024-08-15 NOTE — CONSULTS
Consult Note - Vascular Surgery   Anamaria Parnell 79 y.o. female MRN: [id number]    Unit/Bed#: [facility] Encounter: [id number]    Assessment:  79 year old female  w/ a hx of obesity, CKD IV, DM II w/ peripheral neuropathy, HLD, HTN, CAD, L MCA/PCA CVA S/P L TCAR 9/7/23 ([facility]), FÉLIX, mesenteric artery stenosis and aortoiliac occlusive disease w/ PAD and chronic R hallux dry gangrene presented to [facility] on 7/16/24 w/ worsening R great toe pain and R hallux gangrene. Pt was transferred to [facility] on 7/19 w/ plans for R femoral endarterectomy w/ retrograde stenting and RLE bypass.      - s/p B/L CFAE, L JACI/EIA balloon angio and shockwave lithotripsy w/ insertion of a L EIA drug-coated stent and L JACI VBX stent, L to R PTFE fem-fem bypass, and B/L groin vac placement on 7/24.   - s/p right hallux amputation 7/29/24    Presenting with leukocytosis (persistent elevation), lactic acidosis, lethargy.      Plan:  CT imaging reviewed with vascular fellow.  Fluid collection near left groin site, not encapsulated.  No purulent drainage.  No s/s acute infection affecting bypass.  Recommend medicine admission for sepsis workup including blood cultures.  Support resp status in light of pleural effusions on CT scan.  From a vascular standpoint, antibiotics are not indicated as a wound infection does not seem to be present.  However, defer to primary team ultimately regarding lethargy and potential source of altered mental status.  No vascular intervention required at this time.  Patient has been very clear in the past with vascular team stating she would not want any further surgeries.  However, in the event a surgical emergency would arise, we would discuss potential options.    Consulting Service: ED    Chief Complaint: lethargy, bleeding from left groin site    HPI: Anamaria Parnell is a 79 y.o. female who presents with with lethargy and drainage/reported bleeding from left groin site from rehab. Patient lethargic on exam and does not offer  history.    Per charting from rehab facility, patient reports she feels very tired and complains of nausea. Adult son at bedside explains that he has noticed patient is very low energy and eating very poorly over past few days and also appears confused at times. At home prior to extended hospitalization, son reports patient was very independent able to transfer self and utilize bathroom independently. Patient also complains of acute worsening of chronic back pain from laying in bed and notes presence of sacral wounds.     Patient known to vascular service as L to R PTFE fem-fem bypass, CFAE, L JACI/EIA balloon angio and shockwave lithotripsy with L EIA drug coated stent and L JACI VBX stent, and BL groin vac placement on 7/24/24.    Review of Systems: deferred as patient does not answer questions at this time    Past Medical History:  Past Medical History:   Diagnosis Date    Aphasia as late effect of cerebrovascular accident (CVA) 08/16/2023    Arthritis     Chronic kidney disease     Diabetes mellitus (HCC)     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/20/2021    Endometriosis     High cholesterol     History of left common carotid artery stent placement 10/27/2023    Hypertension     Kidney disease, chronic, stage III (moderate, EGFR 30-59 ml/min) ([facility])     Lumbar disc herniation     Median arcuate ligament syndrome (HCC) 11/05/2019    Neuropathy     Obesity (BMI 30-39.9) 12/04/2017    Obesity, morbid (HCC) 01/20/2021    Peripheral vascular disease (HCC)     Pneumonia     Shoulder injury     left    Spinal stenosis     Stroke ([facility]) 2015    Memory loss       Past Surgical History:  Past Surgical History:   Procedure Laterality Date    ARTERIOGRAM Bilateral 7/24/2024    Procedure: ARTERIOGRAM;  Surgeon: Jose Crum DO;  [facility];  Service: Vascular    CARDIAC CATHETERIZATION      CAROTID STENT Left 9/7/2023    Procedure: L TCAR;  Surgeon: Nicole Gallo MD;   [facility];  Service: Vascular    COLONOSCOPY      FL RETROGRADE PYELOGRAM  2020    FRACTURE SURGERY Right     ankle    IR CAROTID STENT  2023    IR LOWER EXTREMITY ANGIOGRAM  2024    OVARIAN CYST SURGERY      MA CYSTO BLADDER W/URETERAL CATHETERIZATION Bilateral 2020    Procedure: CYSTOSCOPY WITH RETROGRADE PYELOGRAM;  Surgeon: Robert Mitchell MD;  [facility];  Service: Urology    MA CYSTO W/INSERT URETERAL STENT Right 2020    Procedure: INSERTION STENT URETERAL;  Surgeon: Robert Mitchell MD;  [facility];  Service: Urology    MA CYSTO W/REMOVAL OF TUMORS SMALL N/A 2020    Procedure: TRANSURETHRAL RESECTION OF BLADDER TUMOR (TURBT);  Surgeon: Robert Mitchell MD;  [facility];  Service: Urology    MA TEAEC W/WO PATCH GRAFT COMMON FEMORAL Bilateral 2024    Procedure: Bilateral femoral endarterectomies with patch angioplasty; Retrograde iliac intervention, shockwave, stent placement and angioplasty; femoral to femoral bypass with PTFE;  Surgeon: Jose Crum DO;  [facility];  Service: Vascular    ROTATOR CUFF REPAIR Left     TOE AMPUTATION Right 2024    Procedure: Rigth Hallux amputation, partial 1st ray resection;  Surgeon: Eddie Farooq DPM;  [facility];  Service: Podiatry    TONSILLECTOMY         Social History:  Social History     Substance and Sexual Activity   Alcohol Use Never     Social History     Substance and Sexual Activity   Drug Use Never     Social History     Tobacco Use   Smoking Status Former    Current packs/day: 0.00    Average packs/day: 2.0 packs/day for 54.6 years (109.1 ttl pk-yrs)    Types: Cigarettes    Start date: 1966    Quit date: 2020    Years since quittin.0   Smokeless Tobacco Never       Family History:  Family History   Problem Relation Age of Onset    Hypertension Mother     Heart disease Mother         Valvular    Hyperlipidemia Mother     Hypertension Father      "Heart defect Father         Cardiomegaly    Stroke Sister         Cerebrovascular Accident    Arthritis Brother     Other Brother         Back Disorder       Allergies:  Allergies   Allergen Reactions    Fenofibrate Other (See Comments)      blood in urine  hx  Kidney Failure    Colesevelam Other (See Comments)      leg pains    Colestipol Itching and Other (See Comments)      Swelling lower legs    Ezetimibe GI Intolerance    Statins Myalgia       Medications:  No current facility-administered medications for this encounter.       Vitals:  /74 (BP Location: Left arm)   Pulse 93   Temp 97.8 °F (36.6 °C) (Oral)   Resp 20   LMP  (LMP Unknown)   SpO2 93%     I/Os:  No intake/output data recorded.    Lab Results and Cultures:   Lab Results   Component Value Date    WBC 20.50 (H) 08/14/2024    HGB 8.4 (L) 08/14/2024    HCT 30.0 (L) 08/14/2024     (H) 08/14/2024     08/14/2024     Lab Results   Component Value Date    GLUCOSE 179 (H) 07/24/2024    CALCIUM 7.8 (L) 08/14/2024    K 3.6 08/14/2024    CO2 34 (H) 08/14/2024     08/14/2024    BUN 51 (H) 08/14/2024    CREATININE 1.59 (H) 08/14/2024     Lab Results   Component Value Date    INR 2.56 (H) 08/14/2024    INR 1.70 (H) 07/31/2024    INR 2.10 (H) 07/30/2024    PROTIME 27.4 (H) 08/14/2024    PROTIME 20.1 (H) 07/31/2024    PROTIME 23.7 (H) 07/30/2024       Lipid Panel: No results found for: \"CHOL\",     Blood Culture:   Lab Results   Component Value Date    BLOODCX No Growth After 5 Days. 07/17/2024   ,   Urinalysis:   Lab Results   Component Value Date    COLORU Light Yellow 07/17/2024    CLARITYU Clear 07/17/2024    SPECGRAV 1.016 07/17/2024    PHUR 5.5 07/17/2024    LEUKOCYTESUR Moderate (A) 07/17/2024    NITRITE Negative 07/17/2024    GLUCOSEU Negative 07/17/2024    KETONESU Negative 07/17/2024    BILIRUBINUR Negative 07/17/2024    BLOODU Negative 07/17/2024   ,   Urine Culture:   Lab Results   Component Value Date    URINECX (A) " "2024     >100,000 cfu/ml - Lactobacillus gasseri Lactobacillus species    URINECX <10,000 cfu/ml Micrococcus luteus (A) 2024    URINECX (A) 2024     <10,000 cfu/ml - Lactobacillus rhamnosus Lactobacillus species   ,   Wound Culure: No results found for: \"WOUNDCULT\"    Imagin24 CTA c/a/p:  No intra-abdominal fluid collection seen  Expected postoperative changes from the recent bifemoral bypass graft with fluid around the anastomotic site in the left inguinal region due to seroma.     There is no abscess or organized fluid collection in the groin area     Linear hypodensity seen in the posterior aspect of the graft (image 751 series 5) suggesting nonocclusive thrombus     Again noted is atherosclerotic disease in the descending thoracic aorta with the mesenteric arterial stenosis, left renal artery stenosis     Exophytic mass from the stomach measuring 2.5 x 5.1 cm, compatible with gastrointestinal stromal tumor    Physical Exam:    General appearance: toxic, lethargic  Skin: warm to touch  Neurologic: oriented to self  Head: Normocephalic, without obvious abnormality, atraumatic  Lungs: No resp distress  Abdomen: soft, NT  Extremities: BL groin incisions open.  R groin site with serous drainage, no purulence, minimal sloughing. Small vein with scant bleeding, hemostasis achieved with pressure.  L groin site with slough, no purulent drainage.      Agata Jensen PA-C  2024    "

## 2024-08-15 NOTE — SPEECH THERAPY NOTE
Speech-Language Pathology Bedside Swallow Evaluation      Patient Name: Anamaria Parnell    Today's Date: 8/15/2024     Problem List  Principal Problem:    Severe sepsis (HCC)  Active Problems:    H/o CVA    Type 2 diabetes mellitus with diabetic nephropathy (HCC)    Hypokalemia    Gastrointestinal stromal tumor (GIST) (HCC)    Aortoiliac occlusive disease (HCC)    CKD stage 4 secondary to hypertension (HCC)    Paroxysmal atrial fibrillation (HCC)    Chronic HFrEF, LVEF 30%, LVIDd 4.6 cm, AHA Stage C    Elevated troponin    Acute blood loss anemia    Acute metabolic encephalopathy      Past Medical History  Past Medical History:   Diagnosis Date    Aphasia as late effect of cerebrovascular accident (CVA) 08/16/2023    Arthritis     Chronic kidney disease     Diabetes mellitus (HCC)     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/20/2021    Endometriosis     High cholesterol     History of left common carotid artery stent placement 10/27/2023    Hypertension     Kidney disease, chronic, stage III (moderate, EGFR 30-59 ml/min) ([facility])     Lumbar disc herniation     Median arcuate ligament syndrome ([facility]) 11/05/2019    Neuropathy     Obesity (BMI 30-39.9) 12/04/2017    Obesity, morbid (HCC) 01/20/2021    Peripheral vascular disease (HCC)     Pneumonia     Shoulder injury     left    Spinal stenosis     Stroke ([facility]) 2015    Memory loss       Past Surgical History  Past Surgical History:   Procedure Laterality Date    ARTERIOGRAM Bilateral 7/24/2024    Procedure: ARTERIOGRAM;  Surgeon: Jose Crum DO;  [facility];  Service: Vascular    CARDIAC CATHETERIZATION      CAROTID STENT Left 9/7/2023    Procedure: L TCAR;  Surgeon: Nicole Gallo MD;  [facility];  Service: Vascular    COLONOSCOPY      FL RETROGRADE PYELOGRAM  4/6/2020    FRACTURE SURGERY Right     ankle    IR CAROTID STENT  9/7/2023    IR LOWER EXTREMITY ANGIOGRAM  7/24/2024    OVARIAN CYST SURGERY      IN  "CYSTO BLADDER W/URETERAL CATHETERIZATION Bilateral 4/6/2020    Procedure: CYSTOSCOPY WITH RETROGRADE PYELOGRAM;  Surgeon: Robert Mitchell MD;  [facility];  Service: Urology    ID CYSTO W/INSERT URETERAL STENT Right 4/6/2020    Procedure: INSERTION STENT URETERAL;  Surgeon: Robert Mitchell MD;  [facility];  Service: Urology    ID CYSTO W/REMOVAL OF TUMORS SMALL N/A 4/6/2020    Procedure: TRANSURETHRAL RESECTION OF BLADDER TUMOR (TURBT);  Surgeon: Robert Mitchell MD;  [facility];  Service: Urology    ID TEAEC W/WO PATCH GRAFT COMMON FEMORAL Bilateral 7/24/2024    Procedure: Bilateral femoral endarterectomies with patch angioplasty; Retrograde iliac intervention, shockwave, stent placement and angioplasty; femoral to femoral bypass with PTFE;  Surgeon: Jose Crum DO;  [facility];  Service: Vascular    ROTATOR CUFF REPAIR Left     TOE AMPUTATION Right 7/29/2024    Procedure: Rigth Hallux amputation, partial 1st ray resection;  Surgeon: Eddie Farooq DPM;  [facility];  Service: Podiatry    TONSILLECTOMY         Summary   Pt presented with s/s suggestive of profound oral dysphagia. The patient takes bolus via tsp, but is unable to manipulate and transfer. She takes a sip of thin liquids via cup, but also does not transfer. She \"swishes\" water in oral cavity and then spits it out. Pharyngeal stage is not assessed as patient does not initiate swallow.     Risk/s for Aspiration: high      Recommended Diet: NPO   Recommended Form of Meds:  non oral     Other Recommendations: Continue frequent oral care    Current Medical Status  HPI: Anamaria Parnell is a 79 y.o. female who presents with with lethargy and drainage/reported bleeding from left groin site from rehab. Patient lethargic on exam and does not offer history.   Per charting from rehab facility, patient reports she feels very tired and complains of nausea. Adult son at bedside explains that he has noticed " patient is very low energy and eating very poorly over past few days and also appears confused at times. At home prior to extended hospitalization, son reports patient was very independent able to transfer self and utilize bathroom independently. Patient also complains of acute worsening of chronic back pain from laying in bed and notes presence of sacral wounds.    Patient known to vascular service as L to R PTFE fem-fem bypass, CFAE, L JACI/EIA balloon angio and shockwave lithotripsy with L EIA drug coated stent and L JACI VBX stent, and BL groin vac placement on 7/24/24.    Current Precautions:  Fall  Aspiration Delirium  Allergies:  No known food allergies  Past medical history:  Please see H&P for details    Special Studies:  Chest xray 8/14/24:   Bilateral multi lobar groundglass opacities may represent pneumonia or pulmonary vascular congestion.     Social/Education/Vocational Hx:  Pt lives  with family, but has recently been in short term rehab    Swallow Information   Current Risks for Dysphagia & Aspiration: AMS, decreased alertness, and hx CVA  Current Symptoms/Concerns: pocketing food  Current Diet: NPO   Baseline Diet: regular diet and thin liquids    Baseline Assessment   Behavior/Cognition: waxing and waning arousal level  Speech/Language Status: able to follow commands inconsistently and limited verbal output  Patient Positioning: upright in bed  Pain Status/Interventions/Response to Interventions: No report of or nonverbal indications of pain.     Swallow Mechanism Exam  Facial: symmetrical  Labial: WFL  Lingual: WFL  Velum: unable to visualize  Mandible: adequate ROM  Dentition: adequate  Vocal quality:clear/adequate   Respiratory Status: on 2 L O2      Consistencies Assessed and Performance   Consistencies Administered: thin liquids and puree  Materials administered included pudding and thin liquids     Oral Stage: profound  Retrieval via tsp is adequate. Patient unable to draw from straw. She takes  "via cup. No manipulation or transfer observed. Patient \"swishes\" bolus and then spits into basin.    Pharyngeal Stage: absent  No swallow initiated     Esophageal Concerns: none reported    Summary and Recommendations (see above)    Results Reviewed with: RN, PA, and family     Treatment Recommended: dysphagia therapy      Frequency of treatment: 3-5x week as able     Patient Stated Goal: none stated     Dysphagia LTG  -Patient will demonstrate safe and effective oral intake (without overt s/s significant oral/pharyngeal dysphagia including s/s penetration or aspiration) for the highest appropriate diet level.     Short Term Goals:  -Pt will tolerate Dysphagia 1/pureed diet and thin liquid with no significant s/s oral or pharyngeal dysphagia across 1-3 diagnostic session/s    -Patient will tolerate trials of upgraded food and/or liquid texture with no significant s/s of oral or pharyngeal dysphagia including aspiration across 1-3 diagnostic sessions     -Patient will comply with a Video/Modified Barium Swallow study for more complete assessment of swallowing anatomy/physiology/aspiration risk and to assess efficacy of treatment techniques so as to best guide treatment plan    Speech Therapy Prognosis   Prognosis:  guarded     Prognosis Considerations: age, medical status, and cognitive status        "

## 2024-08-15 NOTE — H&P
"[facility]  H&P  Name: Anamaria Parnell 79 y.o. female I MRN: [id number]  Unit/Bed#: QCD I Date of Admission: 8/14/2024   Date of Service: 8/15/2024 I Hospital Day: 1      Assessment & Plan   * Severe sepsis (HCC)  Assessment & Plan  Patient increasingly lethargic over the last several days  Additionally, noted hypothermia  Leukocytosis, elevated lactic, tachycardia, pneumonia on CT  UA ordered and pending  CT chest/abdomen/pelvis with contrast: \" No pulmonary embolism.  No thoracic aortic dissection.  Multifocal areas of groundglass density in both lungs with central predominance and upper lobe predominant may be due to pneumonia.  Small right effusion, smaller left effusion, suggest volume overload and pulmonary congestion.  No intra-abdominal fluid collection seen.  Expected postoperative changes from the recent bifemoral bypass graft with fluid around the anastomotic site in the left inguinal region due to seroma.  There is no abscess or organized fluid collection in the groin area.  Linear hypodensity seen in the posterior aspect of the graft suggesting nonocclusive thrombus.  Again noted is atherosclerotic disease in the descending thoracic aorta with a mesenteric arterial stenosis, left renal artery stenosis.  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  Continue IV Zosyn for pneumonia.  Given that family reports patient has been having trouble swallowing, concern for this possibly being related to aspiration.  Keep NPO.  Consult speech.  Judicious use of IV fluids given patient's underlying reduced LVEF.  Continuous risk first benefit decision making ongoing.  Follow-up " urinalysis.  Follow-up urine culture.  I additionally spoke at length with patient's son as well as son significant other at bedside regarding my concerns with patient's clinical condition in the setting of her underlying comorbidities.  Son does report that patient is DNR/DNI.  He also understands that his mother is very ill.  With that said he would like to continue medical treatment for now.  He does though also report he would like to continue having ongoing discussion regarding his mother and goals of care throughout hospitalization as he also does not want his mother to suffer.  He reports that he would want to meet with the palliative team to continue these discussions.  As such we will place consultation.    Acute metabolic encephalopathy  Assessment & Plan  CT head without acute intracranial abnormality.  Chronic left PCA territory infarct, stable  Noted severe sepsis with pneumonia and UA pending  Patient additionally has baseline memory issues and son reports she has been increasingly somnolent similar to today since prior hospitalization  Treatment of severe sepsis  Delirium protocol    Acute blood loss anemia  Assessment & Plan  During prior hospitalization noted to have melena as well as anemia requiring transfusion  Hemoglobin 8.4 today.  Appears baseline in the 8-9 range    Chronic HFrEF, LVEF 30%, LVIDd 4.6 cm, AHA Stage C  Assessment & Plan  Wt Readings from Last 3 Encounters:   08/12/24 68 kg (150 lb)   08/09/24 68.1 kg (150 lb 2.1 oz)   07/19/24 67.2 kg (148 lb 2.4 oz)     Most recent echo in July 2024 with LVEF 30% which was down from echo in 2023  On PTA torsemide 10 mg daily  Although noted pulmonary venous congestion on CT imaging, patient additionally with severe sepsis  As such holding torsemide for now          Paroxysmal atrial fibrillation (HCC)  Assessment & Plan  Continue PTA metoprolol as well as Eliquis    CKD stage 4 secondary to hypertension (HCC)  Assessment & Plan  Lab Results    Component Value Date    EGFR 31 08/14/2024    EGFR 27 08/09/2024    EGFR 26 08/08/2024    CREATININE 1.59 (H) 08/14/2024    CREATININE 1.77 (H) 08/09/2024    CREATININE 1.84 (H) 08/08/2024     Creatinine at baseline and continue to monitor    Aortoiliac occlusive disease (HCC)  Assessment & Plan  Recent hospitalization for bilateral femoral endarterectomy with bypass and JACI/EIA stenting as well as right first toe amputation  Evaluated by vascular surgery and lower suspicion for infection related to recent surgery  Appreciate vascular team assistance while following  Continue Brilinta as well as statin    Gastrointestinal stromal tumor (GIST) (HCC)  Assessment & Plan  CT imaging with evidence of GIST  Reviewed last prolonged hospitalization in which patient was noted to have melena and it does appear that this GIST was noted on CTA imaging on 7/18; does also appear from notes that the GI team who was seeing patient during last hospitalization.   Consider GI team consultation in the a.m.                VTE Prophylaxis: Apixaban (Eliquis)  / sequential compression device   Code Status: Level 3 - DNAR and DNI       Anticipated Length of Stay:  Patient will be admitted on an Inpatient basis with an anticipated length of stay of  > 2 midnights.   Justification for Hospital Stay: Please see detailed plans noted above.    Chief Complaint:     Lethargy, severe sepsis  History of Present Illness:  Anamaria Parnell is a 79 y.o. female who has past medical history significant for recent prolonged hospitalization from 7/19 to' 8/9 in which patient was found to have a gangrenous right first toe for which she underwent bilateral femoral endarterectomy with bypass and JACI/EIA stenting as well as amputation of the right first toe, heart failure with reduced ejection fraction, severe anemia requiring transfusion, GI bleed, CVA, advanced CKD, A-fib, diabetes who presented to [facility] ER on the evening of 8/14 with  symptoms of increasing fatigue as well as lethargy over the last several days since recent hospitalization.  He reports that patient has been more fatigued and has been eating less as well as appears not to swallow when she puts food in her mouth.  I additionally spoke at length with patient's son as well as son significant other at bedside regarding my concerns with patient's clinical condition in the setting of her underlying comorbidities.  Son does report that patient is DNR/DNI.  He also understands that his mother is very ill.  With that said he would like to continue medical treatment for now.  He does though also report he would like to continue having ongoing discussion regarding his mother and goals of care throughout hospitalization as he also does not want his mother to suffer.  He reports that he would want to meet with the palliative team to continue these discussions.       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/20/2021    Endometriosis     High cholesterol     History of left common carotid artery stent placement 10/27/2023    Hypertension     Kidney disease, chronic, stage III (moderate, EGFR 30-59 ml/min) ([facility])     Lumbar disc herniation     Median arcuate ligament syndrome (HCC) 11/05/2019    Neuropathy     Obesity (BMI 30-39.9) 12/04/2017    Obesity, morbid (HCC) 01/20/2021    Peripheral vascular disease (HCC)     Pneumonia     Shoulder injury     left    Spinal stenosis     Stroke ([facility]) 2015    Memory loss     Past Surgical History:   Procedure Laterality Date    ARTERIOGRAM Bilateral 7/24/2024    Procedure: ARTERIOGRAM;  Surgeon: Jose Crum DO;  [facility]: [facility];   Service: Vascular    CARDIAC CATHETERIZATION      CAROTID STENT Left 9/7/2023    Procedure: L TCAR;  Surgeon: Nicole Gallo MD;  [facility];  Service: Vascular    COLONOSCOPY      FL RETROGRADE PYELOGRAM  4/6/2020    FRACTURE SURGERY Right     ankle    IR CAROTID STENT  9/7/2023    IR LOWER EXTREMITY ANGIOGRAM  7/24/2024    OVARIAN CYST SURGERY      RI CYSTO BLADDER W/URETERAL CATHETERIZATION Bilateral 4/6/2020    Procedure: CYSTOSCOPY WITH RETROGRADE PYELOGRAM;  Surgeon: Robert Mitchell MD;  [facility];  Service: Urology    RI CYSTO W/INSERT URETERAL STENT Right 4/6/2020    Procedure: INSERTION STENT URETERAL;  Surgeon: Robert Mitchell MD;  [facility];  Service: Urology    RI CYSTO W/REMOVAL OF TUMORS SMALL N/A 4/6/2020    Procedure: TRANSURETHRAL RESECTION OF BLADDER TUMOR (TURBT);  Surgeon: Robert Mitchell MD;  [facility];  Service: Urology    RI TEAEC W/WO PATCH GRAFT COMMON FEMORAL Bilateral 7/24/2024    Procedure: Bilateral femoral endarterectomies with patch angioplasty; Retrograde iliac intervention, shockwave, stent placement and angioplasty; femoral to femoral bypass with PTFE;  Surgeon: Jose Crum DO;  [facility];  Service: Vascular    ROTATOR CUFF REPAIR Left     TOE AMPUTATION Right 7/29/2024    Procedure: Rigth Hallux amputation, partial 1st ray resection;  Surgeon: Eddie Farooq DPM;  [facility];  Service: Podiatry    TONSILLECTOMY         Meds/Allergies:  (Not in a hospital admission)      Allergies:   Allergies   Allergen Reactions    Fenofibrate Other (See Comments)      blood in urine  hx  Kidney Failure    Colesevelam Other (See Comments)      leg pains    Colestipol Itching and Other (See Comments)      Swelling lower legs    Ezetimibe GI Intolerance    Statins Myalgia       History:  Marital Status: /Civil Union     Substance Use History:   Social History     Substance and Sexual Activity   Alcohol Use  Never     Social History     Tobacco Use   Smoking Status Former    Current packs/day: 0.00    Average packs/day: 2.0 packs/day for 54.6 years (109.1 ttl pk-yrs)    Types: Cigarettes    Start date: 1966    Quit date: 2020    Years since quittin.0   Smokeless Tobacco Never     Social History     Substance and Sexual Activity   Drug Use Never       Family History:  Family History   Problem Relation Age of Onset    Hypertension Mother     Heart disease Mother         Valvular    Hyperlipidemia Mother     Hypertension Father     Heart defect Father         Cardiomegaly    Stroke Sister         Cerebrovascular Accident    Arthritis Brother     Other Brother         Back Disorder       Physical Exam:     Vitals:   Blood Pressure: 120/59 (24)  Pulse: 84 (24)  Temperature: 97.7 °F (36.5 °C) (24)  Temp Source: Rectal (24)  Respirations: 18 (24)  SpO2: 98 % (24)    Constitutional: Somnolent, opens eyes and says single words at times and then falls back asleep   Eyes:  EOMI, No scleral icterus   HENT:   oropharynx moist, external ears normal, external nose normal   Respiratory: Rhonchi bilaterally  Cardiovascular:  Normal rate, no murmurs   GI:  Soft, nondistended  :  No costovertebral angle tenderness   Musculoskeletal:  no tenderness, no deformities. Back- no tenderness  Integument:  no jaundice, no rash   Neurologic: Somnolent, opens eyes and says single words at times and then falls back asleep, no obvious cranial nerve deficit    Lab Results: I have personally reviewed pertinent reports.      Results from last 7 days   Lab Units 24  1836   WBC Thousand/uL 20.50*   HEMOGLOBIN g/dL 8.4*   HEMATOCRIT % 30.0*   PLATELETS Thousands/uL 242   SEGS PCT % 88*   LYMPHO PCT % 4*   MONO PCT % 6   EOS PCT % 0     Results from last 7 days   Lab Units 24  1836   POTASSIUM mmol/L 3.6   CHLORIDE mmol/L 104   CO2 mmol/L 34*   BUN mg/dL 51*    CREATININE mg/dL 1.59*   CALCIUM mg/dL 7.8*   ALK PHOS U/L 94   ALT U/L 10   AST U/L 16     Results from last 7 days   Lab Units 08/14/24  1836   INR  2.56*         Imaging: I have personally reviewed pertinent reports.      CTA dissection protocol chest/abdomen/pelvis    Result Date: 8/14/2024  Narrative: CTA - CHEST, ABDOMEN AND PELVIS - WITHOUT AND WITH IV CONTRAST INDICATION: AMS, hypoxia, s/p fem fem bypass. Assess bypass site and r/o PE. COMPARISON: None. TECHNIQUE: CT examination of the chest, abdomen and pelvis was performed both prior to and after the administration of intravenous contrast. The noncontrast portion of this examination was performed utilizing low radiation dose technique.  Thin section angiographic arterial phase post contrast technique was used in order to evaluate for aortic dissection. 3D reformatted images and volume rendering were performed on an independent workstation. Multiplanar 2D reformatted images were created from the source data. Radiation dose length product (DLP) for this visit: 2107.04 mGy-cm . This examination, like all CT scans performed in the [facility], was performed utilizing techniques to minimize radiation dose exposure, including the use of iterative reconstruction and automated exposure control. IV Contrast: 100 mL of iohexol (OMNIPAQUE) Enteric Contrast: Not administered. FINDINGS: AORTA: No aortic dissection or intramural hematoma. No aortic aneurysm. Ascending aorta; the aorta at the level of valvular plane measures 2.6 cm Aortic sinus measures about 3.2 cm Ascending aorta measures 3.3 cm Arch; plaquing seen within the arch Brachiocephalic is patent. Atherosclerotic changes seen within the subclavian with moderate narrowing in the range of 50% in the proximal left subclavian artery Patent right subclavian artery. Stenosis noted at the origin of the right vertebral artery. Stenosis is noted at the origin of the left vertebral artery Descending  thoracic aorta demonstrates moderate to severe irregular plaquing Abdominal aorta; severe atherosclerotic plaquing seen. The left common iliac artery is patent and there is severe high-grade stenosis in the right common iliac artery and right external iliac artery Patent left internal iliac artery celiac trunk seen. Atherosclerotic changes seen within the splenic artery hepatic artery. Severe stenosis Bifemoral graft has been placed with the surgical changes. The graft is patent. Fluid attenuation density adjacent to the graft in the left inguinal region measuring 3.4 cm suggesting a seroma. Small amount of perigraft fluid is noted just medial to the anastomosis of the graft to the right femoral vessels. There is no fluid collection seen The right common femoral artery is patent with patent superficial femoral artery,. Left common femoral artery is patent with patent the superficial femoral artery Elongated hypodense area seen along the posterior aspect of the graft suggest nonocclusive thrombus in the graft. This is seen in image 749 series 5 Mesenteric vasculature; moderate to severe severe stenosis of the celiac trunk and SMA seen Severe stenosis left renal artery Severe stenosis, right renal artery CHEST LUNGS: Multifocal patchy groundglass density seen in the both lungs with central predominance these may be due to pneumonia or due to congestion PLEURA: Moderate right effusion seen and small left effusion seen HEART/PULMONARY ARTERIAL TREE: Ascending aorta measures 3.2 cm Pulmonary artery; MEDIASTINUM AND JERMAIN: No significant mediastinal lymph node enlargement seen CHEST WALL AND LOWER NECK: Unremarkable. ABDOMEN LIVER/BILIARY TREE: Unremarkable. GALLBLADDER: No calcified gallstones. No pericholecystic inflammatory change. SPLEEN: Unremarkable. PANCREAS: Stable pancreas with mild pancreatic ductal dilatation as described on the previous study Periampullary diverticulum seen ADRENAL GLANDS: Mild thickening of  the left adrenal gland seen, stable KIDNEYS/URETERS: Left kidney is atrophic right kidney is unremarkable No hydronephrosis seen STOMACH AND BOWEL: An exophytic mass seen in the stomach, measuring 5.1 x 2.5 cm APPENDIX: No findings to suggest appendicitis. ABDOMINOPELVIC CAVITY: No ascites. No pneumoperitoneum. No lymphadenopathy. PELVIS REPRODUCTIVE ORGANS: Unremarkable for patient's age. URINARY BLADDER: Unremarkable. ABDOMINAL WALL/INGUINAL REGIONS: Rounded area seen around the anastomosis of the bypass graft of the left common femoral artery measuring 2.5 cm due to postoperative seroma and there is no fluid collection seen to suggest any abscess. Surgical changes seen in the skin in the both groins and small amount of fluid noted around the graft anastomosis on the right side. Nodular subcutaneous area seen in the subcutaneous fat to subcutaneous injections BONES: No acute fracture or suspicious osseous lesion.     Impression: No pulmonary embolism No thoracic aortic dissection Multifocal areas of groundglass density in the both lungs with central predominance and upper lobe predominant may be due to pneumonia, consider follow-up at 3 months to demonstrate resolution Small right effusion, smaller left effusion, suggest volume overload and pulmonary congestion No intra-abdominal fluid collection seen Expected postoperative changes from the recent bifemoral bypass graft with fluid around the anastomotic site in the left inguinal region due to seroma. There is no abscess or organized fluid collection in the groin area Linear hypodensity seen in the posterior aspect of the graft (image 751 series 5) suggesting nonocclusive thrombus Again noted is atherosclerotic disease in the descending thoracic aorta with the mesenteric arterial stenosis, left renal artery stenosis Exophytic mass from the stomach measuring 2.5 x 5.1 cm, compatible with gastrointestinal stromal tumor The study was marked in EPIC for immediate  notification. Workstation performed: [id number]     CT head without contrast    Result Date: 8/14/2024  Narrative: CT BRAIN - WITHOUT CONTRAST INDICATION:   AMS. COMPARISON: CT brain dated July 30, 2024. TECHNIQUE:  CT examination of the brain was performed.  Multiplanar 2D reformatted images were created from the source data. Radiation dose length product (DLP) for this visit:  908.6 mGy-cm .  This examination, like all CT scans performed in the [facility], was performed utilizing techniques to minimize radiation dose exposure, including the use of iterative reconstruction and automated exposure control. IMAGE QUALITY:  Diagnostic. FINDINGS: PARENCHYMA:  Hypoattenuation in the left posterior parietal-occipital region, not significantly changed since the prior exam, consistent with a chronic left PCA territory infarct. There is mild periventricular white matter low attenuation which is nonspecific and most likely related to chronic small vessel ischemic changes. No acute intracranial hemorrhage or mass effect. VENTRICLES AND EXTRA-AXIAL SPACES: No hydrocephalus or extra-axial collection.     Impression: No acute intracranial abnormality. Chronic left PCA territory infarct, stable. Workstation performed: [id number]     US right upper quadrant with liver dopplers    Result Date: 7/31/2024  Narrative: RIGHT UPPER QUADRANT ULTRASOUND WITH LIVER DOPPLER INDICATION: Elevated liver chemistries, rule out thrombosis. COMPARISON: Renal ultrasound from 9/6/2023; CTA from 7/19/2024 TECHNIQUE: Real-time ultrasound of the right upper quadrant was performed with a curvilinear transducer with both volumetric sweeps and still imaging techniques. FINDINGS: PANCREAS: The pancreas is partially obscured by bowel gas. AORTA AND IVC: The aorta and IVC are partially described by bowel gas. LIVER: Size: Within normal range. The liver measures 15.1 cm in the midclavicular line. Contour: Surface contour is smooth. Parenchyma:  Echogenicity and echotexture are within normal limits. No liver mass identified. LIVER DOPPLER: The main portal vein and primary branch segments are patent and hepatopetal with normal spectral waveform. Hepatic veins are patent. Spectral waveforms within normal limits. Main hepatic artery appears normal size, patent with normal spectral waveform. Splenic vein flow is difficult to assess due to patient's breathing. BILIARY: No gallstones. Wall thickness is mildly thickened at 4 mm although the gallbladder is not well distended. No intrahepatic biliary dilatation. CBD measures 8.0 mm. No choledocholithiasis. KIDNEY: Right kidney measures 9.5 x 4.4 x 3.8 cm. Volume 83.6 mL Kidney within normal limits. ASCITES: None. Right pleural effusion is noted.     Impression: The common duct is mildly dilated at 8 mm although also prominent on the prior CT. Interval follow-up may be useful or if there is high index of suspicion or persistent LFT abnormality/dilatation nonemergent MRCP. Portal venous flow is evident which is hepatopetal Workstation performed: [id number]     Echo follow up/limited w/ contrast if indicated    Result Date: 7/31/2024  Narrative:   Left Ventricle: Wall thickness is moderately increased. The left ventricular ejection fraction is 30% by visual estimation. Systolic function is moderately to severely reduced. There is moderate global hypokinesis with regional variation.  In particular, the entire inferoseptal wall and mid to apical inferior wall is severely hypokinetic.   Mitral Valve: There is severe annular calcification. There is mild to moderate regurgitation.   Tricuspid Valve: There is mild regurgitation.   Aorta: The aortic root is normal in size. The ascending aorta is mildly dilated. The ascending aorta is 3.4 cm. There is global hypokinesis with severe hypokinesis regionally mostly in the inferoseptal and inferior walls.  Ejection fraction appears to be further reduced from prior now down to about  30% visually     CT head wo contrast    Result Date: 7/30/2024  Narrative: CT BRAIN - WITHOUT CONTRAST INDICATION:   Altered mental status. COMPARISON: 9/6/2023. TECHNIQUE:  CT examination of the brain was performed.  Multiplanar 2D reformatted images were created from the source data. Radiation dose length product (DLP) for this visit:  848.71 mGy-cm .  This examination, like all CT scans performed in the [facility], was performed utilizing techniques to minimize radiation dose exposure, including the use of iterative  reconstruction and automated exposure control. IMAGE QUALITY:  Diagnostic. FINDINGS: PARENCHYMA: Hypoattenuation in the left posterior parietal-occipital region, not significantly changed since the prior exam, consistent with a chronic left PCA territory infarct. Periventricular and subcortical hypoattenuating foci consistent with microangiopathic No acute intracranial hemorrhage or mass effect. VENTRICLES AND EXTRA-AXIAL SPACES: No hydrocephalus or extra-axial collection. VISUALIZED ORBITS: Intact. PARANASAL SINUSES: Clear. CALVARIUM AND EXTRACRANIAL SOFT TISSUES: No lytic or blastic lesion or fracture.     Impression: Chronic left PCA territory infarct. No evidence of acute infarct, intracranial hemorrhage or mass. Workstation performed: [id number]     XR foot right 3+ views    Result Date: 7/30/2024  Narrative: XR FOOT 3+ VW RIGHT INDICATION: post-op amputation. COMPARISON: 7/9/2024 FINDINGS: No acute fracture or dislocation. An amputation has been performed in the distal aspect of the first metatarsal. No significant degenerative changes. No lytic or blastic osseous lesion. Atherosclerotic calcifications are present. A bandage is present beyond the amputation site.     Impression: No acute osseous abnormality. Postoperative changes. Workstation performed: [id number]     VAS ARTERIAL DUPLEX- LOWER LIMB BILATERAL    Result Date: 7/29/2024  Narrative:  [facility] REPORT  CLINICAL: Indications:  Initial Post-op evaluation s/p revascularization.  Patient is asymptomatic at this time. Operative History: 2024-07-24 Bilateral femoral endarterectomies with patch angioplasty 2024-07-24 femoral to femoral bypass left to right 2024-07-24 Right angio 2023-09-07 ICA stent TCAR 2020-02-26 Cardiac catheterization Risk Factors The patient has history of Obesity, HTN, Diabetes (NIDDM (Diet)), Hyperlipidemia, CKD, PAD, CAD and previous smoking (quit <1yr ago). Clinical Right Pressure:  / mm Hg, Left Pressure:  112/ mm Hg.  FINDINGS:  Segment                Right       Left                                          PSV (cm/s)  PSV (cm/s)  Common Femoral Artery         156         127  Prox Profunda                  86          41  Prox SFA                      153          47  Mid SFA                        36          32  Dist SFA                       56          11  Proximal Pop                   27          15  Distal Pop                     31          13  Dist Post Tibial               37          14  Dist. Ant. Tibial              16          16     CONCLUSION:  Impression: RIGHT LOWER LIMB: Evidence of patent endarterectomy with Diffuse disease noted throughout the femoral-popliteal arteries without significant focal stenosis. Ankle/Brachial index:   0.53 within severe limits (prior 0.11) PVR/ PPG tracings are dampened. Metatarsal pressure of 48 mmHg Great toe pressure of 22 mmHg, below  the healing range (2nd digit)  LEFT LOWER LIMB: Evidence of patent endarterectomy with Diffuse disease noted throughout the femoral-popliteal arteries without significant focal stenosis. Ankle/Brachial index:   0.57 within severe limits (Prior 0.31 ) PVR/ PPG tracings are dampened. Metatarsal pressure of 10 mmHg Great toe pressure of 43 mmHg, within the healing range.  Compared to previous study on 7/18/2024 , there is some improvement in ABIs after intervention.  SIGNATURE: Electronically Signed by: DON  DO VANIA,RPVI on 2024-07-29 01:32:47 PM    XR foot 3+ vw right    Result Date: 7/29/2024  Narrative: XR FOOT 3+ VW RIGHT INDICATION: hallux gangrene, infection. COMPARISON: 7/22/2024. FINDINGS: There is increasing volume of air within the soft tissues of the first toe in this patient with provided history of gangrene. The previously described cortical destruction along the ventral aspect of the first distal phalanx is not well visualized on the  current examination, likely attributed to differences in patient positioning.     Impression: Increasing volume of air within the soft tissues of the first toe in this patient with provided history of gangrene. The previously described cortical destruction along the ventral aspect of the first distal phalanx is not well visualized on the current examination, likely attributed to differences in patient positioning. Workstation performed: [id number]     XR chest portable    Result Date: 7/29/2024  Narrative: XR CHEST PORTABLE INDICATION: dyspnea/chest tightness. poor respiratory excursion. COMPARISON: CXR 7/24/2024 and 7/23/2024, chest CT 8/25/2021. FINDINGS: Moderate pulmonary edema. Small effusions. Normal cardiomediastinal silhouette. Bones are unremarkable for age. Suture anchor left humeral head. Normal upper abdomen.     Impression: Moderate pulmonary edema with small effusions. Workstation performed: [id number]     XR chest portable ICU    Result Date: 7/24/2024  Narrative: XR CHEST PORTABLE ICU INDICATION: Endotracheal tube positioning. COMPARISON: CXR 7/23/2024, chest CT 8/25/2024. FINDINGS: ET tube 4 cm above the roberta. Minimal atelectasis in the right midlung. No pneumothorax or pleural effusion. Normal cardiomediastinal silhouette. Bones are unremarkable for age. Moderate right glenohumeral degenerative disease. Normal upper abdomen.     Impression: ET tube 4 cm above the roberta. Minimal atelectasis in the right midlung. Workstation performed: [id number]      IR lower extremity angiogram    Result Date: 7/24/2024  Narrative: Please refer to the Operative Note for procedure dictation.    XR chest portable    Result Date: 7/23/2024  Narrative: XR CHEST PORTABLE INDICATION: Shortness of breath. COMPARISON: 7/17/2024 FINDINGS: Pulmonary vasculature and interstitial markings have become more pronounced since the prior study. Small bilateral effusions suspected. Cardiac silhouette size unchanged Bones are unremarkable for age. Normal upper abdomen.     Impression: Interval development of pulmonary congestion. Small bilateral effusions suspected. Workstation performed: [id number]     VAS VEIN MAPPING- LOWER EXTREMITY    Result Date: 7/22/2024  Narrative:  [facility] REPORT CLINICAL: Indications: Pre-operative vein mapping for upcoming lower extremity arterial bypass graft. Patient presents with significant peripheral arterial occlusive disease. Operative History: 2023-09-07 ICA stent TCAR 2020-02-26 Cardiac catheterization Risk Factors The patient has history of Obesity, HTN, Diabetes (NIDDM (Diet)), Hyperlipidemia, CKD, PAD, CAD and previous smoking (quit <1yr ago).  FINDINGS:  Right           AP (mm)  AP (mm)  GSV Inguinal        6.4           GSV Prox Thigh      4.0           GSV Mid Thigh       3.0           GSV Dist Thigh      3.5           GSV Knee            4.1      4.1  GSV Prox Calf       3.6      3.6  GSV Mid Calf        2.2      2.2  GSV Dist Calf       2.8      2.8  GSV Ankle           2.8      2.8   Left            AP (mm)  AP (mm)  GSV Inguinal        6.8      6.8  GSV Prox Thigh      3.4           GSV Mid Thigh       2.6           GSV Dist Thigh      2.2           GSV Knee            2.6      2.6  GSV Prox Calf       2.8      2.8  GSV Mid Calf        1.6      1.6  GSV Dist Calf       1.3      1.3  GSV Ankle           1.8      1.8     CONCLUSION:  Impression: RIGHT LOWER LIMB: The great saphenous vein is patent  from the groin to the ankle. The  intraluminal diameter measurements range from 2.2mm to 6.4mm throughout. LEFT LOWER LIMB: The great saphenous vein is patent  from the groin to the ankle. The intraluminal diameter measurements range from 1.3mm to 6.8mm throughout.  SIGNATURE: Electronically Signed by: DON CARO DO, RPVI on 2024-07-22 07:49:01 PM    CTA ABDOMEN W RUN OFF W WO CONTRAST    Result Date: 7/22/2024  Narrative: CT ANGIOGRAM OF THE AORTA AND LOWER EXTREMITIES WITH IV CONTRAST INDICATION: CLTI with ulceration COMPARISON: CT and pelvis without contrast on 1/22/2022. TECHNIQUE: CT angiogram examination of the abdomen, pelvis, and lower extremities was performed according to standard protocol with intravenous contrast. This examination, like all CT scans performed in the [facility], was performed utilizing techniques to minimize radiation dose exposure, including the use of iterative reconstruction and automated exposure control. 3D reconstructions were performed an independent workstation, and are supplied for review. Rad dose 3132 mGy-cm IV Contrast: 120 mL of iohexol (OMNIPAQUE) Enteric Contrast: Not administered. FINDINGS: VESSELS: The descending thoracic and suprarenal abdominal aorta demonstrate irregular soft plaque with numerous regions of plaque ulceration. The infrarenal abdominal aorta demonstrates severe calcific atherosclerotic disease, with region of relative stenosis approximately 3 cm above the bifurcation measuring up to 7 mm (2:80). There is moderate stenosis of both the celiac and SMA ostia. Severe multifocal stenosis of the main SMA (2:62) Moderate right renal ostial stenosis. Near complete occlusion of the left renal artery with resulting asymmetric left renal atrophy and underperfusion. Focal 1.3 cm ectasia of the left common iliac artery. Multifocal severe stenoses of the bilateral external iliac arteries. Severe bilateral common femoral artery stenosis. Bilateral long segment SFA occlusion  extending from the ostia to the approximate P1 popliteal artery. Three-vessel runoff on the right, the posterior tibial artery is dominant. Two-vessel runoff on the left, the peroneal artery is dominant. Segmental visualization of the posterior tibial artery OTHER FINDINGS ABDOMEN LOWER CHEST: No clinically significant abnormality in the visualized lower chest. LIVER/BILIARY TREE: Unremarkable. GALLBLADDER: No calcified gallstones. No pericholecystic inflammatory change. SPLEEN: Unremarkable. PANCREAS: New dilation of the pancreatic duct in the pancreatic head ADRENAL GLANDS: Unremarkable. KIDNEYS/URETERS: Asymmetric left renal atrophy and relative underperfusion secondary to near renal artery occlusion Right renal cyst. No evidence of obstruction. No solid masses. Bilateral ureters unremarkable. Duplicated right renal collecting system. PELVIS REPRODUCTIVE ORGANS: Fibroid uterus. URINARY BLADDER: Unremarkable. ADDITIONAL ABDOMINAL AND PELVIC STRUCTURES STOMACH AND BOWEL: Colonic diverticulosis without findings of acute diverticulitis. Duodenal diverticulum. Interval increase in size of an exophytic lesion arising medially from the stomach measuring 5.1 x 2.6 x 3.5 cm. Previously measuring up to 3.9 cm in January 2022. APPENDIX: No findings to suggest appendicitis. ABDOMINOPELVIC CAVITY: No ascites. No pneumoperitoneum. No lymphadenopathy. ABDOMINAL WALL/INGUINAL REGIONS: Fat-containing umbilical hernia. Small areas of gas within the intra-abdominal wall consistent with injection sites. BONES: No acute fracture or suspicious osseous lesion. Lumbar degenerative changes with stable grade 1 anterolisthesis of L5 on S1.     Impression: Visualized descending thoracic and suprarenal abdominal aorta demonstrate marked soft plaque with multifocal regions of plaque ulceration. A point of relative stenosis within the infrarenal abdominal aorta measures up to 7 mm in diameter. Bilateral multifocal severe stenosis within the  external iliac and common femoral arteries. Bilateral long segment SFA occlusion extending from the ostia to the level of the P1 popliteal artery. Three-vessel runoff on the right, the posterior tibial artery is dominant. Two-vessel runoff on the left, the peroneal artery is dominant. Segmental visualization of the posterior tibial artery Interval increase in size of an exophytic lesion arising medially from the stomach, again consistent with gastrointestinal stromal tumor. New dilation of the pancreatic duct within the pancreatic head, no obvious pancreatic/ampulla lesion identified. GI consultation advised. The study was marked in EPIC for immediate notification. Workstation performed: [id number]     XR foot 3+ vw right    Result Date: 7/22/2024  Narrative: XR FOOT 3+ VW RIGHT INDICATION: R/o right distal hallux osteomyelitis. Dry gangrene of hallux clinically. COMPARISON: 4/18/2024. FINDINGS: There is a large air within the soft tissues of the first toe. Cortical destruction is demonstrated on the lateral view along the ventral aspect of the first distal phalanx worrisome for acute osteomyelitis.     Impression: Cortical destruction along the ventral aspect of the first distal phalanx worrisome for acute osteomyelitis. Large air within the overlying soft tissues. The study was marked in EPIC for immediate notification. Workstation performed: [id number]     Echo complete w/ contrast if indicated    Result Date: 7/21/2024  Narrative:   Left Ventricle: Left ventricular cavity size is normal. Wall thickness is severely increased. The left ventricular ejection fraction is 41% by biplane measurement. Systolic function is mildly reduced. Global longitudinal strain is reduced at -10% with moderate to severe strain reduction in the ED mid to basal anteroseptal septal and inferior walls.. Unable to grade diastolic dysfunction given the severe degree of mitral annular calcification.   The following segments are  hypokinetic: basal inferoseptal, mid inferoseptal, apical septal, apical inferior, apical lateral and apex.   All other segments are normal.   IVS: There is abnormal septal motion consistent with left bundle branch block.   Left Atrium: The atrium is severely dilated (>48 mL/m2).   Atrial Septum: There is a possible small patent foramen ovale seen with color Doppler.   Mitral Valve: There is severe annular calcification. There is moderate regurgitation.   Tricuspid Valve: There is mild regurgitation. The right ventricular systolic pressure is moderately elevated. The estimated right ventricular systolic pressure is 59.00 mmHg.   Aorta: The aortic root is mildly dilated. The ascending aorta is mildly dilated. The aortic root is 3.40 cm. The ascending aorta is 3.7 cm.   IVC/SVC: The right atrial pressure is estimated at 15.0 mmHg. The inferior vena cava is dilated. Respirophasic changes were blunted (less than 50% variation).   Pericardium: There is a trivial pericardial effusion anterior to the heart.     VAS ARTERIAL DUPLEX- LOWER LIMB BILATERAL    Result Date: 7/19/2024  Narrative:  [facility] REPORT CLINICAL: Indications: Patient with known severe PAD presents with new onset of pain in the feet and calves bilaterally. Operative History: 2023-09-07 ICA stent TCAR 2020-02-26 Cardiac catheterization Risk Factors The patient has history of Obesity, HTN, Diabetes (NIDDM (Diet)), Hyperlipidemia, CKD, PAD, CAD and previous smoking (quit <1yr ago). Clinical Right Pressure:  154/ mm Hg, Left Pressure:  154/ mm Hg.  FINDINGS:  Segment                Right                        Left                                          Impression  PSV (cm/s)  EDV  Impression  PSV (cm/s)  EDV  Common Femoral Artery                      74   24                      49   12  Prox Profunda                              34   14                      21    6  Prox SFA               Occluded                     Occluded                      Mid SFA                Occluded                     Occluded                     Dist SFA               Occluded                                         30   15  Proximal Pop                               40   31                      19   10  Distal Pop                                 17   11                      31   18  Tibioperoneal                              18   13                      42   20  Prox Post Tibial                                    Occluded                     Dist Post Tibial                           16   13  Occluded                     Prox. Ant. Tibial                           6    3                      12    6  Dist. Ant. Tibial      Occluded                                         25   12  Dist Peroneal                                                           18   11     CONCLUSION: Impression: RIGHT LOWER LIMB: Severe diffuse disease noted throughout the femoral-popliteal arteries with high grade stenosis vs occlusion of the proximal-distal superficial femoral artery with reconstitution to the popliteal artery. Evidence suggestive of TIb/Peroneal occlusive disease. There is a high grade stenosis vs occlusion of the distal anterior tibial artery. Ankle/Brachial index: 0.11 which is in the rest pain/tissue loss range (Prior: 0.10) PVR/ PPG tracings are absent. Metatarsal pressure is absent Great toe pressure is absent  LEFT LOWER LIMB: Severe diffuse disease noted throughout the femoral-popliteal arteries with high grade stenosis vs occlusion of the proximal-mid superficial femoral artery with reconstitution in the distal SFA. Evidence suggestive of TIb/Peroneal occlusive disease. There is high grade stenosis vs. occlusion of the entire posterior tibial artery. Ankle/Brachial index:   0.31  which is in the Ischemic range (Prior 0.35  ) PVR/ PPG tracings are dampened. Metatarsal pressure of 20 mmHg Great toe pressure of 19 mmHg, below the healing range   Compared to previous study on  4/18/2024, the right SFA occlusion now extends into the distal portion and there is a decrease in the left LISA and TBI. Technical findings were given to Kathy Ashford on the floor.  SIGNATURE: Electronically Signed by: MAEGAN PHILIP MD, RPVI on 2024-07-19 09:44:20 PM    XR chest portable    Result Date: 7/17/2024  Narrative: XR CHEST PORTABLE INDICATION: hypothermia. COMPARISON: Compared with 1/22/2022 FINDINGS: Poor inspiratory effort. Clear lungs. No pneumothorax or pleural effusion. Normal cardiomediastinal silhouette. Bilateral glenohumeral degenerative changes. Normal upper abdomen.     Impression: No acute cardiopulmonary disease. Workstation performed: [id number]       Total time for visit, including counseling/coordination of care: 75 minutes. Greater than 50% of this total time spent on direct patient counseling and coorination of care.     Epic Records Reviewed as well as Records in Care Everywhere    ** Please Note: Dragon 360 Dictation voice to text software was used in the creation of this document. **

## 2024-08-15 NOTE — WOUND OSTOMY CARE
Consult Note - Wound   Anamaria Parnell 79 y.o. female MRN: [id number]  Unit/Bed#: [facility] 726-01 Encounter: [id number]      History and Present Illness:  79 year old female presented to the hospital with increasing fatigue.  Patient's history significant for CVA, CKD, DM, HTN, former smoker, right hallux amputation, bilateral femoral endarterectomies with femoral to femoral bypass.     Assessment Findings:   Patient agreeable to assessment.  She requires assist x 2 to turn in bed.  Incontinent of urine with purewick in place.  Bilateral heels intact and blanchable.  Skin folds intact.    Present on admission wound to sacrum/buttocks with mixed etiology of pressure and moisture damage--pink/beefy red with full thickness tissue loss to mid sacrum.  Scant serosanguinous drainage.  Miley-wound intact with scar tissue and hyperpigmentation.  No induration.  Bilateral groin wounds--dressings dry and intact per vascular surgery team.  Right foot--dressing dry and intact.  Podiatry consultation pending.      See flowsheet for wound details.    Palliative care consultation pending.      Wound Care Plan:   1-Silicone Cream/Hydraguard lotion to left heel twice daily and as needed.  2-Elevate heels off of bed/chair surface--offloading heel boots.  3-Offloading air cushion in chair when out of bed.  4-Apply moisturizing skin cream to body daily and as needed.  5-Turn/reposition every 2 hours while in bed and weight shift frequently while in chair for pressure re-distribution on skin.   6-[employer] positioning system.  7-Buttocks/sacrum--cleanse with soap and water, pat dry.  Apply calcium alginate to open areas and cover with silicone bordered foam dressing.  Change dressing every other day and as needed with soilage.  8-Groin wounds per vascular surgery.  9-Right foot per podiatry team.    Wound care team to follow.  Plan of care reviewed with primary RN.    Wound 08/07/24 Sacrum (Active)   Wound Image   08/15/24 [id number]   Wound  Description Pink;Beefy red 08/15/24 1309   Miley-wound Assessment Hyperpigmented;Scar Tissue 08/15/24 1309   Wound Length (cm) 6 cm 08/15/24 1309   Wound Width (cm) 2 cm 08/15/24 1309   Wound Depth (cm) 0.2 cm 08/15/24 1309   Wound Surface Area (cm^2) 12 cm^2 08/15/24 1309   Wound Volume (cm^3) 2.4 cm^3 08/15/24 1309   Calculated Wound Volume (cm^3) 2.4 cm^3 08/15/24 1309   Change in Wound Size % -233.33 08/15/24 1309   Drainage Amount Scant 08/15/24 1309   Drainage Description Serosanguineous 08/15/24 1309   Non-staged Wound Description Full thickness 08/15/24 1309   Treatments Cleansed 08/15/24 1309   Dressing Foam, Silicon (eg. Allevyn, etc) 08/15/24 1309   Dressing Changed Changed 08/15/24 1309   Patient Tolerance Tolerated well 08/15/24 1309   Dressing Status Clean;Dry;Intact 08/15/24 1309           Frances Dunne RN, BSN, CWON

## 2024-08-15 NOTE — ASSESSMENT & PLAN NOTE
"Patient increasingly lethargic over the last several days  Additionally, noted hypothermia  Leukocytosis, elevated lactic, tachycardia, pneumonia on CT  UA ordered and pending  CT chest/abdomen/pelvis with contrast: \" No pulmonary embolism.  No thoracic aortic dissection.  Multifocal areas of groundglass density in both lungs with central predominance and upper lobe predominant may be due to pneumonia.  Small right effusion, smaller left effusion, suggest volume overload and pulmonary congestion.  No intra-abdominal fluid collection seen.  Expected postoperative changes from the recent bifemoral bypass graft with fluid around the anastomotic site in the left inguinal region due to seroma.  There is no abscess or organized fluid collection in the groin area.  Linear hypodensity seen in the posterior aspect of the graft suggesting nonocclusive thrombus.  Again noted is atherosclerotic disease in the descending thoracic aorta with a mesenteric arterial stenosis, left renal artery stenosis.  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  Continue IV Zosyn for pneumonia.  Given that family reports patient has been having trouble swallowing, concern for this possibly being related to aspiration.  Keep NPO.  Consult speech.  Judicious use of IV fluids given patient's underlying reduced LVEF.  Continuous risk first benefit decision making ongoing.  Follow-up urinalysis.  Follow-up urine culture.  I additionally spoke at length with patient's son as well as son significant other at bedside regarding my concerns with patient's clinical condition in the setting of her underlying comorbidities.  Son does report that patient is " DNR/DNI.  He also understands that his mother is very ill.  With that said he would like to continue medical treatment for now.  He does though also report he would like to continue having ongoing discussion regarding his mother and goals of care throughout hospitalization as he also does not want his mother to suffer.  He reports that he would want to meet with the palliative team to continue these discussions.  As such we will place consultation.

## 2024-08-15 NOTE — ASSESSMENT & PLAN NOTE
During prior hospitalization noted to have melena as well as anemia requiring transfusion  Hemoglobin 7.1 today, down from 8.4 on admission. This in the setting of IVF bolus  Appears baseline in the 8-9 range

## 2024-08-15 NOTE — ASSESSMENT & PLAN NOTE
Lab Results   Component Value Date    EGFR 31 08/14/2024    EGFR 27 08/09/2024    EGFR 26 08/08/2024    CREATININE 1.59 (H) 08/14/2024    CREATININE 1.77 (H) 08/09/2024    CREATININE 1.84 (H) 08/08/2024     Creatinine at baseline and continue to monitor

## 2024-08-15 NOTE — ASSESSMENT & PLAN NOTE
Wt Readings from Last 3 Encounters:   08/12/24 68 kg (150 lb)   08/09/24 68.1 kg (150 lb 2.1 oz)   07/19/24 67.2 kg (148 lb 2.4 oz)     Most recent echo in July 2024 with LVEF 30% which was down from echo in 2023  On PTA torsemide 10 mg daily  Although noted pulmonary venous congestion on CT imaging, patient additionally with severe sepsis  As such holding torsemide for now

## 2024-08-15 NOTE — ASSESSMENT & PLAN NOTE
POA aeb tachycardia, leukocytosis, and elevated lactic acid level  Imaging concern for possible pneumonia  Procal elevated on admission. Trend  Leukocytosis improving. Trend  Currently on IV Zosyn  Blood cultures pending  ID consulted  Palliative care consult pending as well  NPO pending SLP eval

## 2024-08-15 NOTE — TELEPHONE ENCOUNTER
Selena from [facility] calling to let Dr. Farooq know patient is in the hospital. Thank you

## 2024-08-15 NOTE — SEPSIS NOTE
"  Sepsis Note   Anamaria Parnell 79 y.o. female MRN: [id number]  Unit/Bed#: [facility] Encounter: [id number]       Initial Sepsis Screening       Row Name 08/14/24 2056                Is the patient's history suggestive of a new or worsening infection? Yes (Proceed)  -NV        Suspected source of infection suspect infection, source unknown  -NV        Indicate SIRS criteria Hyperthemia > 38.3C (100.9F) OR Hypothermia <36C (96.8F);Leukocytosis (WBC > 89690 IJL) OR Leukopenia (WBC <4000 IJL) OR Bandemia (WBC >10% bands)  -NV        Are two or more of the above signs & symptoms of infection both present and new to the patient? Yes (Proceed)  -NV        Assess for evidence of organ dysfunction: Are any of the below criteria present within 6 hours of suspected infection and SIRS criteria that are NOT considered to be chronic conditions? Lactate > 2.0  -NV        Date of presentation of severe sepsis 08/14/24  -NV        Time of presentation of severe sepsis 2056  -NV        Sepsis Note: Click \"NEXT\" below (NOT \"close\") to generate sepsis note based on above information. YES (proceed by clicking \"NEXT\")  -NV                  User Key  (r) = Recorded By, (t) = Taken By, (c) = Cosigned By      Initials Name Provider Type    EVER Oconnor MD Resident                    Default Flowsheet Data (Last 720 Hours)       Sepsis Reassess       Row Name 08/14/24 2148                   Repeat Volume Status and Tissue Perfusion Assessment Performed    Date of Reassessment: 08/14/24  -NV        Time of Reassessment: 2148  -NV        Sepsis Reassessment Note: Click \"NEXT\" below (NOT \"close\") to generate sepsis reassessment note. YES (proceed by clicking \"NEXT\")  -NV        Repeat Volume Status and Tissue Perfusion Assessment Performed --                  User Key  (r) = Recorded By, (t) = Taken By, (c) = Cosigned By      Initials Name Provider Type    EVER Oconnor MD Resident                    There is no height or weight on file to " calculate BMI.  Wt Readings from Last 1 Encounters:   08/12/24 68 kg (150 lb)        Ideal body weight: 47.1 kg (103 lb 14.5 oz)  Adjusted ideal body weight: 55.5 kg (122 lb 5.5 oz)

## 2024-08-15 NOTE — ASSESSMENT & PLAN NOTE
Lab Results   Component Value Date    EGFR 33 08/15/2024    EGFR 31 08/14/2024    EGFR 27 08/09/2024    CREATININE 1.51 (H) 08/15/2024    CREATININE 1.59 (H) 08/14/2024    CREATININE 1.77 (H) 08/09/2024     Creatinine at baseline and continue to monitor

## 2024-08-15 NOTE — ASSESSMENT & PLAN NOTE
During prior hospitalization noted to have melena as well as anemia requiring transfusion  Hemoglobin 8.4 today.  Appears baseline in the 8-9 range   Spoke to pt, notified her per Dr Ritchie, xray is normal.

## 2024-08-15 NOTE — CONSULTS
Podiatry - Consultation    Patient Information:   Anamaria Parnell 79 y.o. female MRN: [id number]  Unit/Bed#: [facility] 726-01 Encounter: [id number]  PCP: Ritchie Lawton MD  Date of Admission:  8/14/2024  Date of Consultation: 08/15/24  Requesting Physician: Philip Lopez DO      ASSESSMENT:    Anamaria Parnell is a 79 y.o. female with:    Open wound right foot with exposed metatarsal  Status post right partial first ray amputation date of surgery 7/29/2024  Severe sepsis, possible pneumonia  Chronic heart failure reduced ejection fraction, LVEF 30%  Chronic kidney disease stage IV  Paroxysmal atrial fibrillation  Type 2 diabetes mellitus    PLAN:    Patient was seen and evaluated at bedside.  Previous dressing was removed wound was evaluated and a new Betadine dressing dressing was applied  Patient history was reviewed.  Patient is well-known to podiatry service and had a previous right partial first ray amputation (date of surgery 7/29/2024) following vascular surgery (bilateral femoral endarterectomy with bypass and JACI/EIA stenting).  Patient was planned to return to the OR for right femoral to BK pop bypass postponed hold due to worsening symptoms of leukocytosis and altered mental status and paroxysmal atrial fibrillation.  Current hospital stay secondary to increasing fatigue over the last several days.  Patient is currently undergoing treatment for severe sepsis and possible pneumonia.  Wound to right foot was noted to be stable with no signs of soft tissue infection at this time.  Nothing to culture at this time.  It does not appear that the foot is the source for the sepsis at this time.  Plan to continue local wound care to right foot at this time.  Reviewed labs and vitals.  Patient has leukocytosis and is afebrile at this time.  Blood culture still preliminary.   Patient currently on ceftriaxone per ID and was previously on Zosyn from the ER.  Recommend continue antibiotics per ID  recommendation.  Discussed plan of care with patient's son, Austin, who understands the condition that his mother is in.  Patient's son is in talks with primary team about palliative care and patient is DNR and DNI.  Discussed the current treatment options for open right foot wound secondary to attenuated vascular status.  Patient's son understands that his mother would need further vascular intervention in order to be able to heal the current wound and/or any podiatric intervention.  Patient's son is understanding and is amenable to palliative care regarding the foot currently.  Local wound care consisting of Betadine Adaptic DSD to right foot wound. Wound care instructions placed.  Elevation on green foam wedges or pillows when non-ambulatory  Rest of care per primary team.  Will discuss this plan with my attending and update as needed.    Weightbearing status: Weightbearing as tolerated to right foot to the heel for transfers    SUBJECTIVE:    History of Present Illness:    Anamaria Parnell is a 79 y.o. female who is originally admitted 8/14/2024 due to lethargy and severe sepsis secondary to possible pneumonia. Patient has a past medical history of chronic heart failure reduced ejection fraction, chronic kidney disease stage IV, paroxysmal atrial fibrillation, type 2 diabetes mellitus, hypertension, pulmonary hypertension.  Patient was seen today at bedside alongside her son Austin who is her primary caretaker.  Patient is notably lethargic today and responds at certain points to vocal commands but is exhibiting altered mental status.  Patient's son has been in contact with podiatry service since his mother's last admission wherein she had vascular surgery and a right partial first ray amputation.    We are consulted for open wound to right partial first ray status post partial first ray amputation 7/29/2024.    Review of Systems:    Constitutional: Negative.    HENT: Negative.    Eyes: Negative.    Respiratory:  Negative.    Cardiovascular: Negative.    Gastrointestinal: Negative.    Musculoskeletal: Open wound right foot with exposed first metatarsal  Skin:Open wound right foot with exposed first metatarsal   Neurological: Negative  Psych: Negative.     Past Medical and Surgical History:     Past Medical History:   Diagnosis Date    Aphasia as late effect of cerebrovascular accident (CVA) 08/16/2023    Arthritis     Chronic kidney disease     Diabetes mellitus (HCC)     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/20/2021    Endometriosis     High cholesterol     History of left common carotid artery stent placement 10/27/2023    Hypertension     Kidney disease, chronic, stage III (moderate, EGFR 30-59 ml/min) ([facility])     Lumbar disc herniation     Median arcuate ligament syndrome ([facility]) 11/05/2019    Neuropathy     Obesity (BMI 30-39.9) 12/04/2017    Obesity, morbid ([facility]) 01/20/2021    Peripheral vascular disease ([facility])     Pneumonia     Shoulder injury     left    Spinal stenosis     Stroke ([facility]) 2015    Memory loss       Past Surgical History:   Procedure Laterality Date    ARTERIOGRAM Bilateral 7/24/2024    Procedure: ARTERIOGRAM;  Surgeon: Jose Crum DO;  Location: [facility];  Service: Vascular    CARDIAC CATHETERIZATION      CAROTID STENT Left 9/7/2023    Procedure: L TCAR;  Surgeon: Nicole Gallo MD;  [facility]: [facility];  Service: Vascular    COLONOSCOPY      FL RETROGRADE PYELOGRAM  4/6/2020    FRACTURE SURGERY Right     ankle    IR CAROTID STENT  9/7/2023    IR LOWER EXTREMITY ANGIOGRAM  7/24/2024    OVARIAN CYST SURGERY      OK CYSTO BLADDER W/URETERAL CATHETERIZATION Bilateral 4/6/2020    Procedure: CYSTOSCOPY WITH RETROGRADE PYELOGRAM;  Surgeon: Robert Mitchell MD;  [facility];  Service: Urology    OK CYSTO W/INSERT URETERAL STENT Right 4/6/2020    Procedure: INSERTION STENT URETERAL;  Surgeon: Robert Mitchell MD;  [facility];  Service: Urology    OK  CYSTO W/REMOVAL OF TUMORS SMALL N/A 4/6/2020    Procedure: TRANSURETHRAL RESECTION OF BLADDER TUMOR (TURBT);  Surgeon: Robert Mitchell MD;  [facility];  Service: Urology    FL TEAEC W/WO PATCH GRAFT COMMON FEMORAL Bilateral 7/24/2024    Procedure: Bilateral femoral endarterectomies with patch angioplasty; Retrograde iliac intervention, shockwave, stent placement and angioplasty; femoral to femoral bypass with PTFE;  Surgeon: Jose Crum DO;  Location[facility];  Service: Vascular    ROTATOR CUFF REPAIR Left     TOE AMPUTATION Right 7/29/2024    Procedure: Rigth Hallux amputation, partial 1st ray resection;  Surgeon: Eddie Farooq DPM;  Location: [facility];  Service: Podiatry    TONSILLECTOMY         Meds/Allergies:      Medications Prior to Admission:     acetaminophen (TYLENOL) 500 mg tablet    allopurinol (ZYLOPRIM) 100 mg tablet    apixaban (ELIQUIS) 5 mg    B-D ULTRAFINE III SHORT PEN 31G X 8 MM MISC    Blood Glucose Monitoring Suppl (OneTouch Verio Reflect) w/Device KIT    cinacalcet (SENSIPAR) 30 mg tablet    Diclofenac Sodium (VOLTAREN) 1 %    escitalopram (LEXAPRO) 20 mg tablet    Glucagon 1 MG/0.2ML SOAJ    glucose blood (OneTouch Verio) test strip    insulin detemir (LEVEMIR) 100 units/mL subcutaneous injection    metoprolol succinate (TOPROL-XL) 25 mg 24 hr tablet    OneTouch Delica Lancets 33G MISC    oxyCODONE (ROXICODONE) 5 immediate release tablet    pantoprazole (PROTONIX) 40 mg tablet    pravastatin (PRAVACHOL) 80 mg tablet    sodium bicarbonate 650 mg tablet    ticagrelor (BRILINTA) 90 MG    torsemide (DEMADEX) 10 mg tablet    Allergies   Allergen Reactions    Fenofibrate Other (See Comments)      blood in urine  hx  Kidney Failure    Colesevelam Other (See Comments)      leg pains    Colestipol Itching and Other (See Comments)      Swelling lower legs    Ezetimibe GI Intolerance    Statins Myalgia       Social History:     Marital Status: /Civil Union    Substance  "Use History:   Social History     Substance and Sexual Activity   Alcohol Use Never     Social History     Tobacco Use   Smoking Status Former    Current packs/day: 0.00    Average packs/day: 2.0 packs/day for 54.6 years (109.1 ttl pk-yrs)    Types: Cigarettes    Start date: 1966    Quit date: 2020    Years since quittin.0   Smokeless Tobacco Never     Social History     Substance and Sexual Activity   Drug Use Never       Family History:    Family History   Problem Relation Age of Onset    Hypertension Mother     Heart disease Mother         Valvular    Hyperlipidemia Mother     Hypertension Father     Heart defect Father         Cardiomegaly    Stroke Sister         Cerebrovascular Accident    Arthritis Brother     Other Brother         Back Disorder         OBJECTIVE:    Vitals:   Blood Pressure: 118/60 (08/15/24 144)  Pulse: 97 (08/15/24 144)  Temperature: 98.3 °F (36.8 °C) (08/15/24 1449)  Temp Source: Axillary (08/15/24 0919)  Respirations: 16 (08/15/24 1449)  Height: 4' 11\" (149.9 cm) (08/15/24 0919)  Weight - Scale: 56.4 kg (124 lb 5.4 oz) (08/15/24 0919)  SpO2: 96 % (08/15/24 1449)    Physical Exam:    General Appearance: Alert, cooperative, no distress.  HEENT: Head normocephalic, atraumatic, without obvious abnormality.  Heart: Normal rate and rhythm.  Lungs: Non-labored breathing. No respiratory distress.  Abdomen: Without distension.  Psychiatric: AAOx3  Lower Extremity:  Vascular:   Right DP and PT pulses are absent. Left DP and PT pulses are absent. CRT < 3 seconds at the digits. +3/4 edema noted at bilateral lower extremities. Pedal hair is absent. Skin temperature is WNL bilaterally.    Musculoskeletal:  MMT is 4/5 in all muscle compartments bilaterally. ROM at the 1st MPJ and ankle joint are reduced bilaterally with the leg extended.  Status post right partial first ray amputation    Dermatological:  Lower extremity wound(s) as noted below:    Wound #: 1  Location: Right first " "metatarsal, status post partial first ray amputation  Length 5cm: Width 3cm: Depth 2cm:   Deepest Tissue Noted in Base: bone  Probe to Bone: Yes  Peripheral Skin Description: Attached  Granulation: 30% Fibrotic Tissue: 40% Necrotic Tissue: 30%   Drainage Amount: minimal  Signs of Infection: Yes , probe to bone. NO cellulitis/lymphangitis/crepitus. NO odor, purulence noted    Neurological:  Gross sensation is diminished. Protective sensation is diminished. Patient Denies numbness and/or paresthesias.    Clinical Images 08/15/24:      Additional data:     Lab Results: I have personally reviewed pertinent labs including:    Results from last 7 days   Lab Units 08/15/24  0450   WBC Thousand/uL 16.48*   HEMOGLOBIN g/dL 7.1*   HEMATOCRIT % 25.1*   PLATELETS Thousands/uL 221   SEGS PCT % 88*   LYMPHO PCT % 6*   MONO PCT % 4   EOS PCT % 1     Results from last 7 days   Lab Units 08/15/24  0450   POTASSIUM mmol/L 3.2*   CHLORIDE mmol/L 104   CO2 mmol/L 33*   BUN mg/dL 47*   CREATININE mg/dL 1.51*   CALCIUM mg/dL 7.5*   ALK PHOS U/L 86   ALT U/L 9   AST U/L 13     Results from last 7 days   Lab Units 08/15/24  0450   INR  2.21*       Cultures: I have personally reviewed pertinent cultures including:    Results from last 7 days   Lab Units 08/14/24  1930   BLOOD CULTURE  Received in Microbiology Lab. Culture in Progress.  Received in Microbiology Lab. Culture in Progress.               ** Please Note: Portions of the record may have been created with voice recognition software. Occasional wrong word or \"sound a like\" substitutions may have occurred due to the inherent limitations of voice recognition software. Read the chart carefully and recognize, using context, where substitutions have occurred. **   "

## 2024-08-15 NOTE — ASSESSMENT & PLAN NOTE
Lab Results   Component Value Date    HGBA1C 5.9 (H) 07/09/2024       Recent Labs     08/14/24  2259 08/15/24  0756   POCGLU 173* 176*       Blood Sugar Average: Last 72 hrs:  (P) 174.5    Controlled based on recent A1c  AM Lantus held   Diet per SLP recs  Continue glucose checks and SSI TID AC and QHS

## 2024-08-15 NOTE — DISCHARGE INSTR - OTHER ORDERS
Wound Care Plan:   1-Silicone Cream/Hydraguard lotion to left heel twice daily and as needed.  2-Elevate heels off of bed/chair surface--offloading heel boots.  3-Offloading air cushion in chair when out of bed.  4-Apply lotion to body daily and as needed.  5-Turn/reposition every 2 hours while in bed and weight shift frequently while in chair for pressure re-distribution on skin.   6-Tortoise positioning system or low air-loss mattress.  7-Buttocks/sacrum--cleanse with soap and water, pat dry.  Apply calcium alginate to open areas and cover with silicone bordered foam dressing.  Change dressing every other day and as needed with soilage.  8-Groin wounds per vascular surgery.  9-Right foot per podiatry team.

## 2024-08-15 NOTE — UTILIZATION REVIEW
Initial Clinical Review    Admission: Date/Time/Statement:   Admission Orders (From admission, onward)       Ordered        08/14/24 2149  INPATIENT ADMISSION  Once                          Orders Placed This Encounter   Procedures    INPATIENT ADMISSION     Standing Status:   Standing     Number of Occurrences:   1     Order Specific Question:   Level of Care     Answer:   Med Surg [16]     Order Specific Question:   Estimated length of stay     Answer:   More than 2 Midnights     Order Specific Question:   Certification     Answer:   I certify that inpatient services are medically necessary for this patient for a duration of greater than two midnights. See H&P and MD Progress Notes for additional information about the patient's course of treatment.     ED Arrival Information       Expected   -    Arrival   8/14/2024 17:35    Acuity   Urgent              Means of arrival   Ambulance    Escorted by   [facility]    Service   Hospitalist    Admission type   Emergency              Arrival complaint   Blood Clot             Chief Complaint   Patient presents with    Medical Problem     Per ems pt comes from [facility] pt recently had a vascular procedure done and was told to come here to be re-evaluated. They also stated she has been lethargic. Pt does not know why she is here.        Initial Presentation: 79 y.o. female w/ PMH of HFrEF, anemia, GI bleed, CVA, CKD, afib, DM presented to the ED from SNF via EMS w/ increasing fatigue and lethargy over the last several days since recent hospitalization.  Pt was recently hospitalized from 07/19-08/09 for gangrenous R 1st toe s/p b/l fem endarterectomy w/ bypass and stenting and amputation of the R 1st toe. She has been more fatigued and has been eating less, appears not to swallow when she puts food in her mouth.  In the ED, she was found w/ leukocytosis, elevated lactic, tachycardia, pneumonia on CT CAP.  CT head without acute intracranial abnormality.  Chronic left PCA territory infarct, stable.   On exam,  somnolent, opens eyes and says single words at times and then falls back asleep, rhonchi bilaterally noted  Given IV Zosyn, 500 cc IVF bolus, IV Dilaudid.    Admit as Inpatient for evaluation and treatment of severe sepsis, acute metabolic encephalopathy.   Plan: f/u UA. Cont IV Zosyn. Keep NPO.Consult ST. F/u Urine cx.       Vascular Surg Consult: Pt presented w/ leukocytosis (persistent elevation), lactic acidosis, lethargy. She was s/p B/L CFAE, L JACI/EIA balloon angio and shockwave lithotripsy w/ insertion of a L EIA drug-coated stent and L JACI VBX stent, L to R PTFE fem-fem bypass, and B/L groin vac placement on 7/24 and R femoral endarterectomy w/ retrograde stenting and RLE bypass on 07/29.  Imaging revealed fluid collection near left groin site, not encapsulated. No purulent drainage. No s/s acute infection affecting bypass. Recommend medicine admission for sepsis workup including blood cultures. No vascular intervention required at this time. Delirium protocol. Palliative Care Consult for [facility].     Date: 08/15   Day 2:   IM Notes: Pt sonolent but awakens easily when her name is called. Oriented to self. Leukocytosis improving. Trend. Currently on IV Zosyn. Blood cultures pending. ID consulted. Palliative care consult pending. NPO pending SLP eval. Continue holding torsemide for now. Hemoglobin 7.1 today, down from 8.4 on admission. Cont to mon. Baseline 8-9.     Palliative Care Consult: [facility]:  Pt son desired no further hospitalizations, as understanding of patient's previously communicated wishes was to reach natural death at home and not within the hospital/facility. Son wishes to honor this wish. Discussed hospice.  given plan of care in alignment with hospice model of care, referral for CM/hospice placed.     ID Consult:SIRS versus sepsis, acute encephalopathy, Right hallux dry gangrene : UA unremarkable. Procalcitonin is mildly elevated   Plan: dc  Zosyn. Start IV Ceftriaxone for possible PNA. F/u bld cxs. F/u procal tomorrow. Rpt CBC tomorrow. Mon fever curve. Cont LWC. Podiatry evel.     Podiatry Consult: Currently foot wound appears to be stable, no signs of current infection to this area however there is some necrosis noted to the tissue margins.   Plan for localized treatment and follow-up for vascular surgery option of bypass procedure.    Date: 08/16  Day 3: Has surpassed a 2nd midnight with active treatments and services.  Pt w/ poor po intake, reports not hungry/thirsty. Na 153 this am. Gentle IVF with D5W. Nephrology consulted. On IV Ceftriaxone. Bld cxs no growth x 2 after 24 hrs. NPO. Hospice consulted. Cont Torsemide for now. Glucose checks and SSI coverage Q6hr for now as patient is currently NPO.     Nephrology Consult: Hypernatremia:  Plan: Increase D5W to 80 cc/h. BMP a.m. Potassium supplementation ordered. Bicarbonate concentration is elevated. She is on chronic oral bicarb but will discontinue in the hospital for now.     ID Notes: Higher concern for aspiration pneumonitis rather than pneumonia as she has minimal respiration symptoms, was quickly weaned to room air. Higher concern for aspiration pneumonitis rather than pneumonia as she has minimal respiration symptoms, was quickly weaned to room air. Mon resp symptoms. tentative plan is for transition to hospice. continue Palliative wound care     Hospice Services Notes: Pt was approved for home hospice LOC. Home DME ordered for delivery on Tuesday. Plan is for transport home Wednesday by noon, hospice nurse will come Wednesday afternoon.     ED Triage Vitals   Temperature Pulse Respirations Blood Pressure SpO2 Pain Score   08/14/24 1750 08/14/24 1742 08/14/24 1742 08/14/24 1742 08/14/24 1742 08/15/24 0627   97.8 °F (36.6 °C) 93 20 139/74 93 % 8     Weight (last 2 days)       None            Vital Signs (last 3 days)       Date/Time Temp Pulse Resp BP MAP (mmHg) SpO2 Calculated FIO2 (%) -  Nasal Cannula Nasal Cannula O2 Flow Rate (L/min) O2 Device Patient Position - Orthostatic VS Ernestina Coma Scale Score Pain    08/15/24 0800 -- 92 16 144/77 102 -- -- -- -- -- -- --    08/15/24 0657 -- -- -- -- -- -- -- -- -- -- 13 --    08/15/24 0647 97.6 °F (36.4 °C) -- -- -- -- -- -- -- -- -- -- --    08/15/24 0645 -- 82 14 -- -- 99 % 28 2 L/min Nasal cannula Lying -- --    08/15/24 0627 -- -- -- -- -- -- -- -- -- -- -- 8    08/15/24 0500 96.9 °F (36.1 °C) 74 15 130/71 93 98 % 28 2 L/min Nasal cannula Lying -- --    08/15/24 0300 -- 78 18 125/58 84 100 % 28 2 L/min Nasal cannula Lying -- --    08/14/24 2228 97.7 °F (36.5 °C) -- -- -- -- -- -- -- -- -- -- --    08/14/24 2045 -- 84 18 120/59 85 98 % -- -- -- -- -- --    08/14/24 2042 96 °F (35.6 °C) -- -- -- -- -- -- -- -- -- -- --    08/14/24 1840 -- -- -- -- -- -- -- -- None (Room air) -- -- --    08/14/24 1839 -- -- -- -- -- -- -- -- -- -- 14 --    08/14/24 1750 97.8 °F (36.6 °C) -- -- -- -- -- -- -- -- -- -- --    08/14/24 1742 -- 93 20 139/74 95 93 % -- -- None (Room air) Lying -- --              Pertinent Labs/Diagnostic Test Results:   Radiology:  CTA dissection protocol chest/abdomen/pelvis   Final Interpretation by Nora Rey MD (08/14 2125)   No pulmonary embolism      No thoracic aortic dissection      Multifocal areas of groundglass density in the both lungs with central predominance and upper lobe predominant may be due to pneumonia, consider follow-up at 3 months to demonstrate resolution      Small right effusion, smaller left effusion, suggest volume overload and pulmonary congestion      No intra-abdominal fluid collection seen   Expected postoperative changes from the recent bifemoral bypass graft with fluid around the anastomotic site in the left inguinal region due to seroma.      There is no abscess or organized fluid collection in the groin area      Linear hypodensity seen in the posterior aspect of the graft (image 751 series 5)  suggesting nonocclusive thrombus      Again noted is atherosclerotic disease in the descending thoracic aorta with the mesenteric arterial stenosis, left renal artery stenosis      Exophytic mass from the stomach measuring 2.5 x 5.1 cm, compatible with gastrointestinal stromal tumor      The study was marked in EPIC for immediate notification.            Workstation performed: [id number]         XR chest portable   Final Interpretation by Harpal Manzanares MD (08/15 0738)      Bilateral multi lobar groundglass opacities may represent pneumonia or pulmonary vascular congestion.            Workstation performed: [id number]         CT head without contrast   ED Interpretation by Shawna Oconnor MD (08/14 1905)   No ICH, mass effect, or ischemic infarct per my read       Final Interpretation by Roberto Carlos Cardoza MD (08/14 1950)      No acute intracranial abnormality.      Chronic left PCA territory infarct, stable.            Workstation performed: [id number]           Cardiology:  No orders to display     GI:  No orders to display           Results from last 7 days   Lab Units 08/15/24  0450 08/14/24  1836 08/09/24  0554   WBC Thousand/uL 16.48* 20.50* 17.89*   HEMOGLOBIN g/dL 7.1* 8.4* 8.0*   HEMATOCRIT % 25.1* 30.0* 27.3*   PLATELETS Thousands/uL 221 242 265   TOTAL NEUT ABS Thousands/µL 14.46* 18.11*  --          Results from last 7 days   Lab Units 08/15/24  0450 08/14/24  1836 08/09/24  0554   SODIUM mmol/L 148* 147 145   POTASSIUM mmol/L 3.2* 3.6 3.1*   CHLORIDE mmol/L 104 104 103   CO2 mmol/L 33* 34* 33*   ANION GAP mmol/L 11 9 9   BUN mg/dL 47* 51* 72*   CREATININE mg/dL 1.51* 1.59* 1.77*   EGFR ml/min/1.73sq m 33 31 27   CALCIUM mg/dL 7.5* 7.8* 8.3*   MAGNESIUM mg/dL 2.0  --   --      Results from last 7 days   Lab Units 08/15/24  0450 08/14/24  1930 08/14/24  1836 08/09/24  0554   AST U/L 13  --  16 16   ALT U/L 9  --  10 18   ALK PHOS U/L 86  --  94 80   TOTAL PROTEIN g/dL 5.0*  --  5.4* 5.1*  "  ALBUMIN g/dL 2.6*  --  2.9* 2.7*   TOTAL BILIRUBIN mg/dL 1.65*  --  1.69* 1.56*   AMMONIA umol/L  --  18  --   --      Results from last 7 days   Lab Units 08/15/24  0756 08/14/24  2259 08/09/24  1103 08/09/24  0738 08/08/24  2106 08/08/24  1607 08/08/24  1103   POC GLUCOSE mg/dl 176* 173* 161* 172* 213* 156* 165*     Results from last 7 days   Lab Units 08/15/24  0450 08/14/24  1836 08/09/24  0554   GLUCOSE RANDOM mg/dL 151* 193* 163*             No results found for: \"BETA-HYDROXYBUTYRATE\"                   Results from last 7 days   Lab Units 08/14/24  2345 08/14/24  2040 08/14/24  1836   HS TNI 0HR ng/L  --   --  218*   HS TNI 2HR ng/L  --  149*  --    HSTNI D2 ng/L  --  -69  --    HS TNI 4HR ng/L 209*  --   --    HSTNI D4 ng/L -9  --   --          Results from last 7 days   Lab Units 08/15/24  0450 08/14/24  1836   PROTIME seconds 24.5* 27.4*   INR  2.21* 2.56*   PTT seconds 30 31         Results from last 7 days   Lab Units 08/14/24  1836   PROCALCITONIN ng/ml 0.52*     Results from last 7 days   Lab Units 08/14/24  2116 08/14/24  1836   LACTIC ACID mmol/L 2.1* 2.1*                                                 Results from last 7 days   Lab Units 08/15/24  0450   CLARITY UA  Clear   COLOR UA  Light Yellow   SPEC GRAV UA  1.044*   PH UA  6.0   GLUCOSE UA mg/dl Negative   KETONES UA mg/dl Negative   BLOOD UA  Negative   PROTEIN UA mg/dl 30 (1+)*   NITRITE UA  Negative   BILIRUBIN UA  Negative   UROBILINOGEN UA (BE) mg/dl <2.0   LEUKOCYTES UA  Negative   WBC UA /hpf None Seen   RBC UA /hpf None Seen   BACTERIA UA /hpf None Seen   EPITHELIAL CELLS WET PREP /hpf Occasional                                 Results from last 7 days   Lab Units 08/14/24  1930   BLOOD CULTURE  Received in Microbiology Lab. Culture in Progress.  Received in Microbiology Lab. Culture in Progress.                   ED Treatment-Medication Administration from 08/14/2024 9795 to 08/15/2024 2909         Date/Time Order Dose Route Action "     08/14/2024 2038 iohexol (OMNIPAQUE) 350 MG/ML injection (SINGLE-DOSE) 100 mL 100 mL Intravenous Given     08/14/2024 2128 piperacillin-tazobactam (ZOSYN) 4.5 g in sodium chloride 0.9 % 100 mL IVPB 4.5 g Intravenous New Bag     08/14/2024 2132 multi-electrolyte (ISOLYTE-S PH 7.4) bolus 500 mL 500 mL Intravenous New Bag     08/14/2024 2301 insulin lispro (HumALOG/ADMELOG) 100 units/mL subcutaneous injection 1-5 Units 1 Units Subcutaneous Given     08/14/2024 2344 piperacillin-tazobactam (ZOSYN) 4.5 g in sodium chloride 0.9 % 100 mL IV LOADING DOSE 4.5 g Intravenous New Bag     08/15/2024 0445 piperacillin-tazobactam (ZOSYN) 4.5 g in sodium chloride 0.9 % 100 mL IVPB (EXTENDED INFUSION) 4.5 g Intravenous New Bag     08/15/2024 0624 silver nitrate-potassium nitrate (ARZOL SILVER NITRATE) 75-25 % applicator 1 applicator 1 applicator Topical Given by Other     08/15/2024 0627 HYDROmorphone HCl (DILAUDID) injection 0.2 mg 0.2 mg Intravenous Given            Past Medical History:   Diagnosis Date    Aphasia as late effect of cerebrovascular accident (CVA) 08/16/2023    Arthritis     Chronic kidney disease     Diabetes mellitus (HCC)     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/20/2021    Endometriosis     High cholesterol     History of left common carotid artery stent placement 10/27/2023    Hypertension     Kidney disease, chronic, stage III (moderate, EGFR 30-59 ml/min) ([facility])     Lumbar disc herniation     Median arcuate ligament syndrome ([facility]) 11/05/2019    Neuropathy     Obesity (BMI 30-39.9) 12/04/2017    Obesity, morbid (HCC) 01/20/2021    Peripheral vascular disease (HCC)     Pneumonia     Shoulder injury     left    Spinal stenosis     Stroke ([facility]) 2015    Memory loss     Present on Admission:   Aortoiliac occlusive disease (HCC)   Chronic HFrEF, LVEF 30%, LVIDd 4.6 cm, AHA Stage C   CKD stage 4 secondary to hypertension (HCC)   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 female  Admission Orders:  SCD  PT/OT/ST    Scheduled Medications:  allopurinol, 100 mg, Oral, Daily  [START ON 8/16/2024] apixaban, 5 mg, Oral, BID  escitalopram, 20 mg, Oral, Daily  insulin detemir, 10 Units, Subcutaneous, Daily  insulin lispro, 1-5 Units, Subcutaneous, TID AC  insulin lispro, 1-5 Units, Subcutaneous, HS  metoprolol succinate, 25 mg, Oral, Daily  pantoprazole, 40 mg, Oral, BID  piperacillin-tazobactam, 4.5 g, Intravenous, Q8H  pravastatin, 80 mg, Oral, Daily With Dinner  sodium bicarbonate, 650 mg, Oral, BID after meals  ticagrelor, 90 mg, Oral, Q12H JAVIER      Continuous IV Infusions: none     PRN Meds:  acetaminophen, 650 mg, Oral, Q6H PRN        IP CONSULT TO VASCULAR SURGERY  IP CONSULT TO PALLIATIVE CARE    Network Utilization Review Department  ATTENTION: Please call with any questions or concerns to [phone] and carefully listen to the prompts so that you are directed to the right person. All voicemails are confidential.   For Discharge needs, contact Care Management DC Support Team at [phone] opt. [phone]  Send all requests for admission clinical reviews, approved or denied determinations and any other requests to dedicated fax number below belonging to the campus where the patient is receiving treatment. List of dedicated fax numbers for the Facilities:  FACILITY NAME UR FAX NUMBER   ADMISSION DENIALS (Administrative/Medical Necessity) [phone]   DISCHARGE SUPPORT TEAM (NETWORK) [phone]   PARENT CHILD HEALTH (Maternity/NICU/Pediatrics) [phone]   [facility] [phone]   [facility] [phone]   [facility] [phone]   [facility] [phone]   [facility] [phone]   [facility]  [phone]   [facility] [phone]   [facility] [phone]   [facility] [phone]   [facility] [phone]   [facility] [phone]   [facility] [phone]

## 2024-08-15 NOTE — PLAN OF CARE
Problem: Potential for Falls  Goal: Patient will remain free of falls  Description: INTERVENTIONS:  - Educate patient/family on patient safety including physical limitations  - Instruct patient to call for assistance with activity   - Consult OT/PT to assist with strengthening/mobility   - Keep Call bell within reach  - Keep bed low and locked with side rails adjusted as appropriate  - Keep care items and personal belongings within reach  - Initiate and maintain comfort rounds  - Make Fall Risk Sign visible to staff  - Offer Toileting every 2 Hours, in advance of need  - Initiate/Maintain bed/chair alarm  - Obtain necessary fall risk management equipment:   - Apply yellow socks and bracelet for high fall risk patients  - Consider moving patient to room near nurses station  Outcome: Progressing     Problem: INFECTION - ADULT  Goal: Absence or prevention of progression during hospitalization  Description: INTERVENTIONS:  - Assess and monitor for signs and symptoms of infection  - Monitor lab/diagnostic results  - Monitor all insertion sites, i.e. indwelling lines, tubes, and drains  - Monitor endotracheal if appropriate and nasal secretions for changes in amount and color  - Burt appropriate cooling/warming therapies per order  - Administer medications as ordered  - Instruct and encourage patient and family to use good hand hygiene technique  - Identify and instruct in appropriate isolation precautions for identified infection/condition  Outcome: Progressing  Goal: Absence of fever/infection during neutropenic period  Description: INTERVENTIONS:  - Monitor WBC    Outcome: Progressing     Problem: SAFETY ADULT  Goal: Patient will remain free of falls  Description: INTERVENTIONS:  - Educate patient/family on patient safety including physical limitations  - Instruct patient to call for assistance with activity   - Consult OT/PT to assist with strengthening/mobility   - Keep Call bell within reach  - Keep bed low  and locked with side rails adjusted as appropriate  - Keep care items and personal belongings within reach  - Initiate and maintain comfort rounds  - Make Fall Risk Sign visible to staff  - Offer Toileting every 2 Hours, in advance of need  - Initiate/Maintain bed/chair alarm  - Obtain necessary fall risk management equipment:   - Apply yellow socks and bracelet for high fall risk patients  - Consider moving patient to room near nurses station  Outcome: Progressing  Goal: Maintain or return to baseline ADL function  Description: INTERVENTIONS:  -  Assess patient's ability to carry out ADLs; assess patient's baseline for ADL function and identify physical deficits which impact ability to perform ADLs (bathing, care of mouth/teeth, toileting, grooming, dressing, etc.)  - Assess/evaluate cause of self-care deficits   - Assess range of motion  - Assess patient's mobility; develop plan if impaired  - Assess patient's need for assistive devices and provide as appropriate  - Encourage maximum independence but intervene and supervise when necessary  - Involve family in performance of ADLs  - Assess for home care needs following discharge   - Consider OT consult to assist with ADL evaluation and planning for discharge  - Provide patient education as appropriate  Outcome: Progressing  Goal: Maintains/Returns to pre admission functional level  Description: INTERVENTIONS:  - Perform AM-PAC 6 Click Basic Mobility/ Daily Activity assessment daily.  - Set and communicate daily mobility goal to care team and patient/family/caregiver.   - Collaborate with rehabilitation services on mobility goals if consulted  - Perform Range of Motion 3 times a day.  - Reposition patient every 2 hours.  - Dangle patient 3 times a day  - Stand patient 3 times a day  - Ambulate patient 3 times a day  - Out of bed to chair 3 times a day   - Out of bed for meals 3 times a day  - Out of bed for toileting  - Record patient progress and toleration of  activity level   Outcome: Progressing     Problem: DISCHARGE PLANNING  Goal: Discharge to home or other facility with appropriate resources  Description: INTERVENTIONS:  - Identify barriers to discharge w/patient and caregiver  - Arrange for needed discharge resources and transportation as appropriate  - Identify discharge learning needs (meds, wound care, etc.)  - Arrange for interpretive services to assist at discharge as needed  - Refer to Case Management Department for coordinating discharge planning if the patient needs post-hospital services based on physician/advanced practitioner order or complex needs related to functional status, cognitive ability, or social support system  Outcome: Progressing     Problem: Knowledge Deficit  Goal: Patient/family/caregiver demonstrates understanding of disease process, treatment plan, medications, and discharge instructions  Description: Complete learning assessment and assess knowledge base.  Interventions:  - Provide teaching at level of understanding  - Provide teaching via preferred learning methods  Outcome: Progressing     Problem: NEUROSENSORY - ADULT  Goal: Achieves stable or improved neurological status  Description: INTERVENTIONS  - Monitor and report changes in neurological status  - Monitor vital signs such as temperature, blood pressure, glucose, and any other labs ordered   - Initiate measures to prevent increased intracranial pressure  - Monitor for seizure activity and implement precautions if appropriate      Outcome: Progressing  Goal: Achieves maximal functionality and self care  Description: INTERVENTIONS  - Monitor swallowing and airway patency with patient fatigue and changes in neurological status  - Encourage and assist patient to increase activity and self care.   - Encourage visually impaired, hearing impaired and aphasic patients to use assistive/communication devices  Outcome: Progressing     Problem: RESPIRATORY - ADULT  Goal: Achieves optimal  ventilation and oxygenation  Description: INTERVENTIONS:  - Assess for changes in respiratory status  - Assess for changes in mentation and behavior  - Position to facilitate oxygenation and minimize respiratory effort  - Oxygen administered by appropriate delivery if ordered  - Initiate smoking cessation education as indicated  - Encourage broncho-pulmonary hygiene including cough, deep breathe, Incentive Spirometry  - Assess the need for suctioning and aspirate as needed  - Assess and instruct to report SOB or any respiratory difficulty  - Respiratory Therapy support as indicated  Outcome: Progressing     Problem: GENITOURINARY - ADULT  Goal: Maintains or returns to baseline urinary function  Description: INTERVENTIONS:  - Assess urinary function  - Encourage oral fluids to ensure adequate hydration if ordered  - Administer IV fluids as ordered to ensure adequate hydration  - Administer ordered medications as needed  - Offer frequent toileting  - Follow urinary retention protocol if ordered  Outcome: Progressing     Problem: METABOLIC, FLUID AND ELECTROLYTES - ADULT  Goal: Electrolytes maintained within normal limits  Description: INTERVENTIONS:  - Monitor labs and assess patient for signs and symptoms of electrolyte imbalances  - Administer electrolyte replacement as ordered  - Monitor response to electrolyte replacements, including repeat lab results as appropriate  - Instruct patient on fluid and nutrition as appropriate  Outcome: Progressing  Goal: Fluid balance maintained  Description: INTERVENTIONS:  - Monitor labs   - Monitor I/O and WT  - Instruct patient on fluid and nutrition as appropriate  - Assess for signs & symptoms of volume excess or deficit  Outcome: Progressing  Goal: Glucose maintained within target range  Description: INTERVENTIONS:  - Monitor Blood Glucose as ordered  - Assess for signs and symptoms of hyperglycemia and hypoglycemia  - Administer ordered medications to maintain glucose within  target range  - Assess nutritional intake and initiate nutrition service referral as needed  Outcome: Progressing     Problem: HEMATOLOGIC - ADULT  Goal: Maintains hematologic stability  Description: INTERVENTIONS  - Assess for signs and symptoms of bleeding or hemorrhage  - Monitor labs  - Administer supportive blood products/factors as ordered and appropriate  Outcome: Progressing

## 2024-08-15 NOTE — ED PROVIDER NOTES
History  Chief Complaint   Patient presents with    Medical Problem     Per ems pt comes from [facility] facility pt recently had a vascular procedure done and was told to come here to be re-evaluated. They also stated she has been lethargic. Pt does not know why she is here.      HPI    Anamaria Parnell is a 79 y.o. female PMH PAD, admission 7/16/2024 for sepsis related to infected, gangrenous toe now status post amputation, balloon angioplasty, and femorofemoral bypass presenting from her SNF for altered mental status and concern for needing dressing change to right foot.  Jorde of my history is obtained from chart review.  It appears that patient was unable to answer questions appropriately today.  This may be within range of normal per baseline, however per geriatrician's note there is concern for decline.  I also see in the chart that the patient's family is reach out to vascular surgery for an appointment as they are concerned patient does need a dressing change.  Patient was seen by vascular surgery before the time of my evaluation with new dressing in place. Patient during my evaluation would fall asleep and dropped to SpO2 of 85 to 88%.  Was easily aroused and would perk back up to 95 to 99%.  Placed on 2 L nasal cannula.    Prior to Admission Medications   Prescriptions Last Dose Informant Patient Reported? Taking?   B-D ULTRAFINE III SHORT PEN 31G X 8 MM MISC  Self, Child No No   Sig: USE TWICE A DAY   Blood Glucose Monitoring Suppl (OneTouch Verio Reflect) w/Device KIT   No No   Sig: Check blood sugars three times daily. Please substitute with appropriate alternative as covered by patient's insurance. Dx: E11.65   Diclofenac Sodium (VOLTAREN) 1 %   Yes No   Sig: Apply 2 g topically 4 (four) times a day   Glucagon 1 MG/0.2ML SOAJ  Self, Child Yes No   Sig: Inject 1 mg under the skin if needed (low blood sugar)   OneTouch Delica Lancets 33G MISC   No No   Sig: Check blood sugars three times daily. Please  substitute with appropriate alternative as covered by patient's insurance. Dx: E11.65   acetaminophen (TYLENOL) 500 mg tablet  Self, Child Yes No   Sig: Take 650 mg by mouth every 6 (six) hours as needed for mild pain   allopurinol (ZYLOPRIM) 100 mg tablet   No No   Sig: TAKE 1 TABLET BY MOUTH EVERY DAY   apixaban (ELIQUIS) 5 mg   No No   Sig: Take 1 tablet (5 mg total) by mouth 2 (two) times a day   cinacalcet (SENSIPAR) 30 mg tablet   No No   Sig: TAKE 1 TABLET (30 MG TOTAL) BY MOUTH EVERY OTHER DAY   escitalopram (LEXAPRO) 20 mg tablet   No No   Sig: Take 1 tablet (20 mg total) by mouth daily   glucose blood (OneTouch Verio) test strip   No No   Sig: Check blood sugars three times daily. Please substitute with appropriate alternative as covered by patient's insurance. Dx: E11.65   insulin detemir (LEVEMIR) 100 units/mL subcutaneous injection   No No   Sig: Inject 10 Units under the skin in the morning   metoprolol succinate (TOPROL-XL) 25 mg 24 hr tablet   No No   Sig: Take 1 tablet (25 mg total) by mouth daily   oxyCODONE (ROXICODONE) 5 immediate release tablet   No No   Sig: Take 1 tablet (5 mg total) by mouth every 6 (six) hours as needed for severe pain for up to 10 days ** FOR SNF USE ONLY ** Max Daily Amount: 20 mg   pantoprazole (PROTONIX) 40 mg tablet   No No   Sig: Take 1 tablet (40 mg total) by mouth 2 (two) times a day   pravastatin (PRAVACHOL) 80 mg tablet  Child, Self No No   Sig: TAKE 1 TABLET BY MOUTH DAILY WITH DINNER   sodium bicarbonate 650 mg tablet   No No   Sig: Take 1 tablet (650 mg total) by mouth 2 (two) times daily after meals   ticagrelor (BRILINTA) 90 MG  Child, Self No No   Sig: Take 1 tablet (90 mg total) by mouth every 12 (twelve) hours   torsemide (DEMADEX) 10 mg tablet   No No   Sig: Take 1 tablet (10 mg total) by mouth daily      Facility-Administered Medications: None       Past Medical History:   Diagnosis Date    Aphasia as late effect of cerebrovascular accident (CVA)  08/16/2023    Arthritis     Chronic kidney disease     Diabetes mellitus (HCC)     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/20/2021    Endometriosis     High cholesterol     History of left common carotid artery stent placement 10/27/2023    Hypertension     Kidney disease, chronic, stage III (moderate, EGFR 30-59 ml/min) ([facility])     Lumbar disc herniation     Median arcuate ligament syndrome ([facility]) 11/05/2019    Neuropathy     Obesity (BMI 30-39.9) 12/04/2017    Obesity, morbid (HCC) 01/20/2021    Peripheral vascular disease ([facility])     Pneumonia     Shoulder injury     left    Spinal stenosis     Stroke ([facility]) 2015    Memory loss       Past Surgical History:   Procedure Laterality Date    ARTERIOGRAM Bilateral 7/24/2024    Procedure: ARTERIOGRAM;  Surgeon: Jose Crum DO;  Location: [facility];  Service: Vascular    CARDIAC CATHETERIZATION      CAROTID STENT Left 9/7/2023    Procedure: L TCAR;  Surgeon: Nicole Gallo MD;  [facility];  Service: Vascular    COLONOSCOPY      FL RETROGRADE PYELOGRAM  4/6/2020    FRACTURE SURGERY Right     ankle    IR CAROTID STENT  9/7/2023    IR LOWER EXTREMITY ANGIOGRAM  7/24/2024    OVARIAN CYST SURGERY      CO CYSTO BLADDER W/URETERAL CATHETERIZATION Bilateral 4/6/2020    Procedure: CYSTOSCOPY WITH RETROGRADE PYELOGRAM;  Surgeon: Robert Mitchell MD;  [facility];  Service: Urology    CO CYSTO W/INSERT URETERAL STENT Right 4/6/2020    Procedure: INSERTION STENT URETERAL;  Surgeon: Robert Mitchell MD;  [facility];  Service: Urology    CO CYSTO W/REMOVAL OF TUMORS SMALL N/A 4/6/2020    Procedure: TRANSURETHRAL RESECTION OF BLADDER TUMOR (TURBT);  Surgeon: Robert Mitchell MD;  [facility];  Service: Urology    CO TEAEC W/WO PATCH GRAFT COMMON FEMORAL Bilateral 7/24/2024    Procedure: Bilateral femoral endarterectomies with patch angioplasty; Retrograde iliac intervention, shockwave, stent placement and  angioplasty; femoral to femoral bypass with PTFE;  Surgeon: Jose Crum DO;  [facility];  Service: Vascular    ROTATOR CUFF REPAIR Left     TOE AMPUTATION Right 2024    Procedure: Rigth Hallux amputation, partial 1st ray resection;  Surgeon: Eddie Farooq DPM;  [facility];  Service: Podiatry    TONSILLECTOMY         Family History   Problem Relation Age of Onset    Hypertension Mother     Heart disease Mother         Valvular    Hyperlipidemia Mother     Hypertension Father     Heart defect Father         Cardiomegaly    Stroke Sister         Cerebrovascular Accident    Arthritis Brother     Other Brother         Back Disorder     I have reviewed and agree with the history as documented.    E-Cigarette/Vaping    E-Cigarette Use Never User      E-Cigarette/Vaping Substances    Nicotine No     THC No     CBD No     Flavoring No     Other No     Unknown No      Social History     Tobacco Use    Smoking status: Former     Current packs/day: 0.00     Average packs/day: 2.0 packs/day for 54.6 years (109.1 ttl pk-yrs)     Types: Cigarettes     Start date: 1966     Quit date: 2020     Years since quittin.0    Smokeless tobacco: Never   Vaping Use    Vaping status: Never Used   Substance Use Topics    Alcohol use: Never    Drug use: Never        Review of Systems   Unable to perform ROS: Mental status change       Physical Exam  ED Triage Vitals   Temperature Pulse Respirations Blood Pressure SpO2   24 1750 24 1742 24 1742 24 17424 174   97.8 °F (36.6 °C) 93 20 139/74 93 %      Temp Source Heart Rate Source Patient Position - Orthostatic VS BP Location FiO2 (%)   24 1750 24 1742 24 1742 24 174 --   Oral Monitor Lying Left arm       Pain Score       08/15/24 0627       8             Orthostatic Vital Signs  Vitals:    08/15/24 0918 08/15/24 0919 08/15/24 1449 08/15/24 2120   BP: 134/68  118/60 136/70   Pulse:  86 97 99    Patient Position - Orthostatic VS:  Lying         Physical Exam  Constitutional:       General: She is not in acute distress.     Appearance: Normal appearance. She is not ill-appearing or toxic-appearing.   HENT:      Head: Normocephalic and atraumatic.      Nose: Nose normal.      Mouth/Throat:      Mouth: Mucous membranes are moist.   Eyes:      Pupils: Pupils are equal, round, and reactive to light.   Cardiovascular:      Pulses: Normal pulses.   Pulmonary:      Effort: Pulmonary effort is normal.   Abdominal:      General: Abdomen is flat.   Skin:     General: Skin is warm and dry.   Neurological:      General: No focal deficit present.      Mental Status: She is alert and oriented to person, place, and time.         ED Medications  Medications   acetaminophen (TYLENOL) tablet 650 mg (has no administration in time range)   allopurinol (ZYLOPRIM) tablet 100 mg (100 mg Oral Not Given 8/15/24 0810)   escitalopram (LEXAPRO) tablet 20 mg (20 mg Oral Not Given 8/15/24 0810)   insulin detemir (LEVEMIR) subcutaneous injection 10 Units (0 Units Subcutaneous Hold 8/15/24 0819)   metoprolol succinate (TOPROL-XL) 24 hr tablet 25 mg (25 mg Oral Not Given 8/15/24 0810)   pantoprazole (PROTONIX) EC tablet 40 mg (40 mg Oral Not Given 8/15/24 1739)   pravastatin (PRAVACHOL) tablet 80 mg (80 mg Oral Not Given 8/15/24 1552)   sodium bicarbonate tablet 650 mg (650 mg Oral Not Given 8/15/24 1651)   ticagrelor (BRILINTA) tablet 90 mg (90 mg Oral Not Given 8/15/24 2104)   apixaban (ELIQUIS) tablet 5 mg (has no administration in time range)   cefTRIAXone (ROCEPHIN) 1,000 mg in dextrose 5 % 50 mL IVPB (1,000 mg Intravenous New Bag 8/15/24 1811)   insulin lispro (HumALOG/ADMELOG) 100 units/mL subcutaneous injection 1-5 Units (1 Units Subcutaneous Given 8/16/24 0511)   iohexol (OMNIPAQUE) 350 MG/ML injection (SINGLE-DOSE) 100 mL (100 mL Intravenous Given 8/14/24 2038)   piperacillin-tazobactam (ZOSYN) 4.5 g in sodium chloride 0.9 % 100  mL IVPB (0 g Intravenous Stopped 8/14/24 2218)   multi-electrolyte (ISOLYTE-S PH 7.4) bolus 500 mL (0 mL Intravenous Stopped 8/15/24 0055)   piperacillin-tazobactam (ZOSYN) 4.5 g in sodium chloride 0.9 % 100 mL IV LOADING DOSE (0 g Intravenous Stopped 8/15/24 0056)   silver nitrate-potassium nitrate (ARZOL SILVER NITRATE) 75-25 % applicator 1 applicator (1 applicator Topical Given by Other 8/15/24 0624)   HYDROmorphone HCl (DILAUDID) injection 0.2 mg (0.2 mg Intravenous Given 8/15/24 0627)   potassium chloride 20 mEq IVPB (premix) (0 mEq Intravenous Stopped 8/15/24 1347)       Diagnostic Studies  Results Reviewed       Procedure Component Value Units Date/Time    Blood culture [[id number]] Collected: 08/14/24 1930    Lab Status: Preliminary result Specimen: Blood from Arm, Right Updated: 08/15/24 2101     Blood Culture No Growth at 24 hrs.    Blood culture [[id number]] Collected: 08/14/24 1930    Lab Status: Preliminary result Specimen: Blood from Arm, Left Updated: 08/15/24 2101     Blood Culture No Growth at 24 hrs.    Fingerstick Glucose (POCT) [[id number]]  (Abnormal) Collected: 08/15/24 0756    Lab Status: Final result Specimen: Blood Updated: 08/15/24 0801     POC Glucose 176 mg/dl     Comprehensive metabolic panel [[id number]]  (Abnormal) Collected: 08/15/24 0450    Lab Status: Final result Specimen: Blood from Arm, Left Updated: 08/15/24 0544     Sodium 148 mmol/L      Potassium 3.2 mmol/L      Chloride 104 mmol/L      CO2 33 mmol/L      ANION GAP 11 mmol/L      BUN 47 mg/dL      Creatinine 1.51 mg/dL      Glucose 151 mg/dL      Calcium 7.5 mg/dL      Corrected Calcium 8.6 mg/dL      AST 13 U/L      ALT 9 U/L      Alkaline Phosphatase 86 U/L      Total Protein 5.0 g/dL      Albumin 2.6 g/dL      Total Bilirubin 1.65 mg/dL      eGFR 33 ml/min/1.73sq m     Narrative:      National Kidney Disease Foundation guidelines for Chronic Kidney Disease (CKD):     Stage 1 with normal or high GFR (GFR > 90 mL/min/1.73 square  meters)    Stage 2 Mild CKD (GFR = 60-89 mL/min/1.73 square meters)    Stage 3A Moderate CKD (GFR = 45-59 mL/min/1.73 square meters)    Stage 3B Moderate CKD (GFR = 30-44 mL/min/1.73 square meters)    Stage 4 Severe CKD (GFR = 15-29 mL/min/1.73 square meters)    Stage 5 End Stage CKD (GFR <15 mL/min/1.73 square meters)  Note: GFR calculation is accurate only with a steady state creatinine    Magnesium [[id number]]  (Normal) Collected: 08/15/24 0450    Lab Status: Final result Specimen: Blood from Arm, Left Updated: 08/15/24 0544     Magnesium 2.0 mg/dL     Protime-INR [[id number]]  (Abnormal) Collected: 08/15/24 0450    Lab Status: Final result Specimen: Blood from Arm, Left Updated: 08/15/24 0543     Protime 24.5 seconds      INR 2.21    Narrative:      INR Therapeutic Range    Indication                                             INR Range      Atrial Fibrillation                                               2.0-3.0  Hypercoagulable State                                    2.0.2.3  Left Ventricular Asist Device                            2.0-3.0  Mechanical Heart Valve                                  -    Aortic(with afib, MI, embolism, HF, LA enlargement,    and/or coagulopathy)                                     2.0-3.0 (2.5-3.5)     Mitral                                                             2.5-3.5  Prosthetic/Bioprosthetic Heart Valve               2.0-3.0  Venous thromboembolism (VTE: VT, PE        2.0-3.0    APTT [[id number]]  (Normal) Collected: 08/15/24 0450    Lab Status: Final result Specimen: Blood from Arm, Left Updated: 08/15/24 0543     PTT 30 seconds     Urine Microscopic [[id number]]  (Normal) Collected: 08/15/24 0450    Lab Status: Final result Specimen: Urine, Other Updated: 08/15/24 0532     RBC, UA None Seen /hpf      WBC, UA None Seen /hpf      Epithelial Cells Occasional /hpf      Bacteria, UA None Seen /hpf     UA w Reflex to Microscopic w Reflex to Culture [[id number]]  (Abnormal)  Collected: 08/15/24 0450    Lab Status: Final result Specimen: Urine, Other Updated: 08/15/24 0531     Color, UA Light Yellow     Clarity, UA Clear     Specific Gravity, UA 1.044     pH, UA 6.0     Leukocytes, UA Negative     Nitrite, UA Negative     Protein, UA 30 (1+) mg/dl      Glucose, UA Negative mg/dl      Ketones, UA Negative mg/dl      Urobilinogen, UA <2.0 mg/dl      Bilirubin, UA Negative     Occult Blood, UA Negative    CBC and differential [[id number]]  (Abnormal) Collected: 08/15/24 0450    Lab Status: Final result Specimen: Blood from Arm, Left Updated: 08/15/24 0518     WBC 16.48 Thousand/uL      RBC 2.48 Million/uL      Hemoglobin 7.1 g/dL      Hematocrit 25.1 %       fL      MCH 28.6 pg      MCHC 28.3 g/dL      RDW 21.7 %      MPV 11.7 fL      Platelets 221 Thousands/uL      nRBC 1 /100 WBCs      Segmented % 88 %      Immature Grans % 1 %      Lymphocytes % 6 %      Monocytes % 4 %      Eosinophils Relative 1 %      Basophils Relative 0 %      Absolute Neutrophils 14.46 Thousands/µL      Absolute Immature Grans 0.23 Thousand/uL      Absolute Lymphocytes 0.97 Thousands/µL      Absolute Monocytes 0.72 Thousand/µL      Eosinophils Absolute 0.09 Thousand/µL      Basophils Absolute 0.01 Thousands/µL     HS Troponin I 4hr [[id number]]  (Abnormal) Collected: 08/14/24 2345    Lab Status: Final result Specimen: Blood from Arm, Right Updated: 08/15/24 0022     hs TnI 4hr 209 ng/L      Delta 4hr hsTnI -9 ng/L     MRSA culture [[id number]] Collected: 08/14/24 2345    Lab Status: In process Specimen: Nares from Nose Updated: 08/14/24 2351    Lactic acid 2 Hours [[id number]]  (Abnormal) Collected: 08/14/24 2116    Lab Status: Final result Specimen: Blood from Line, Venous Updated: 08/14/24 2334     LACTIC ACID 2.1 mmol/L     Narrative:      Result may be elevated if tourniquet was used during collection.    Fingerstick Glucose (POCT) [[id number]]  (Abnormal) Collected: 08/14/24 2259    Lab Status: Final result  Specimen: Blood Updated: 08/14/24 2300     POC Glucose 173 mg/dl     HS Troponin I 2hr [[id number]]  (Abnormal) Collected: 08/14/24 2040    Lab Status: Final result Specimen: Blood from Line, Venous Updated: 08/14/24 2121     hs TnI 2hr 149 ng/L      Delta 2hr hsTnI -69 ng/L     Ammonia [[id number]]  (Normal) Collected: 08/14/24 1930    Lab Status: Final result Specimen: Blood from Arm, Right Updated: 08/14/24 2007     Ammonia 18 umol/L     Lactic acid [[id number]]  (Abnormal) Collected: 08/14/24 1836    Lab Status: Final result Specimen: Blood from Arm, Left Updated: 08/14/24 1917     LACTIC ACID 2.1 mmol/L     Narrative:      Result may be elevated if tourniquet was used during collection.    Comprehensive metabolic panel [[id number]]  (Abnormal) Collected: 08/14/24 1836    Lab Status: Final result Specimen: Blood from Arm, Left Updated: 08/14/24 1916     Sodium 147 mmol/L      Potassium 3.6 mmol/L      Chloride 104 mmol/L      CO2 34 mmol/L      ANION GAP 9 mmol/L      BUN 51 mg/dL      Creatinine 1.59 mg/dL      Glucose 193 mg/dL      Calcium 7.8 mg/dL      Corrected Calcium 8.7 mg/dL      AST 16 U/L      ALT 10 U/L      Alkaline Phosphatase 94 U/L      Total Protein 5.4 g/dL      Albumin 2.9 g/dL      Total Bilirubin 1.69 mg/dL      eGFR 31 ml/min/1.73sq m     Narrative:      National Kidney Disease Foundation guidelines for Chronic Kidney Disease (CKD):     Stage 1 with normal or high GFR (GFR > 90 mL/min/1.73 square meters)    Stage 2 Mild CKD (GFR = 60-89 mL/min/1.73 square meters)    Stage 3A Moderate CKD (GFR = 45-59 mL/min/1.73 square meters)    Stage 3B Moderate CKD (GFR = 30-44 mL/min/1.73 square meters)    Stage 4 Severe CKD (GFR = 15-29 mL/min/1.73 square meters)    Stage 5 End Stage CKD (GFR <15 mL/min/1.73 square meters)  Note: GFR calculation is accurate only with a steady state creatinine    Procalcitonin [[id number]]  (Abnormal) Collected: 08/14/24 9760    Lab Status: Final result Specimen: Blood from  Arm, Left Updated: 08/14/24 1916     Procalcitonin 0.52 ng/ml     HS Troponin 0hr (reflex protocol) [[id number]]  (Abnormal) Collected: 08/14/24 1836    Lab Status: Final result Specimen: Blood from Arm, Left Updated: 08/14/24 1909     hs TnI 0hr 218 ng/L     APTT [[id number]]  (Normal) Collected: 08/14/24 1836    Lab Status: Final result Specimen: Blood from Arm, Left Updated: 08/14/24 1905     PTT 31 seconds     Protime-INR [[id number]]  (Abnormal) Collected: 08/14/24 1836    Lab Status: Final result Specimen: Blood from Arm, Left Updated: 08/14/24 1905     Protime 27.4 seconds      INR 2.56    Narrative:      INR Therapeutic Range    Indication                                             INR Range      Atrial Fibrillation                                               2.0-3.0  Hypercoagulable State                                    2.0.2.3  Left Ventricular Asist Device                            2.0-3.0  Mechanical Heart Valve                                  -    Aortic(with afib, MI, embolism, HF, LA enlargement,    and/or coagulopathy)                                     2.0-3.0 (2.5-3.5)     Mitral                                                             2.5-3.5  Prosthetic/Bioprosthetic Heart Valve               2.0-3.0  Venous thromboembolism (VTE: VT, PE        2.0-3.0    CBC and differential [[id number]]  (Abnormal) Collected: 08/14/24 1836    Lab Status: Final result Specimen: Blood from Arm, Left Updated: 08/14/24 1850     WBC 20.50 Thousand/uL      RBC 2.92 Million/uL      Hemoglobin 8.4 g/dL      Hematocrit 30.0 %       fL      MCH 28.8 pg      MCHC 28.0 g/dL      RDW 22.1 %      MPV 11.8 fL      Platelets 242 Thousands/uL      nRBC 1 /100 WBCs      Segmented % 88 %      Immature Grans % 2 %      Lymphocytes % 4 %      Monocytes % 6 %      Eosinophils Relative 0 %      Basophils Relative 0 %      Absolute Neutrophils 18.11 Thousands/µL      Absolute Immature Grans 0.35 Thousand/uL      Absolute  Lymphocytes 0.83 Thousands/µL      Absolute Monocytes 1.13 Thousand/µL      Eosinophils Absolute 0.06 Thousand/µL      Basophils Absolute 0.02 Thousands/µL                    CTA dissection protocol chest/abdomen/pelvis   Final Result by Nora Rey MD (08/14 2125)   No pulmonary embolism      No thoracic aortic dissection      Multifocal areas of groundglass density in the both lungs with central predominance and upper lobe predominant may be due to pneumonia, consider follow-up at 3 months to demonstrate resolution      Small right effusion, smaller left effusion, suggest volume overload and pulmonary congestion      No intra-abdominal fluid collection seen   Expected postoperative changes from the recent bifemoral bypass graft with fluid around the anastomotic site in the left inguinal region due to seroma.      There is no abscess or organized fluid collection in the groin area      Linear hypodensity seen in the posterior aspect of the graft (image 751 series 5) suggesting nonocclusive thrombus      Again noted is atherosclerotic disease in the descending thoracic aorta with the mesenteric arterial stenosis, left renal artery stenosis      Exophytic mass from the stomach measuring 2.5 x 5.1 cm, compatible with gastrointestinal stromal tumor      The study was marked in EPIC for immediate notification.            Workstation performed: [id number]         XR chest portable   Final Result by Harpal Manzanares MD (08/15 0738)      Bilateral multi lobar groundglass opacities may represent pneumonia or pulmonary vascular congestion.            Workstation performed: [id number]         CT head without contrast   ED Interpretation by Shawna Oconnor MD (08/14 1905)   No ICH, mass effect, or ischemic infarct per my read       Final Result by Roberto Carlos Cardoza MD (08/14 1950)      No acute intracranial abnormality.      Chronic left PCA territory infarct, stable.            Workstation performed: [id number]  "              Procedures  ECG 12 Lead Documentation Only    Date/Time: 8/16/2024 5:15 AM    Performed by: Shawna Oconnor MD  Authorized by: Shawna Oconnor MD    ECG reviewed by me, the ED Provider: yes    Patient location:  ED  Previous ECG:     Comparison to cardiac monitor: Yes    Interpretation:     Interpretation: normal    Rate:     ECG rate assessment: normal    Rhythm:     Rhythm: sinus rhythm    Ectopy:     Ectopy: none    QRS:     QRS axis:  Normal  Conduction:     Conduction: abnormal      Abnormal conduction: complete LBBB    ST segments:     ST segments:  Normal  T waves:     T waves: normal          ED Course  ED Course as of 08/16/24 0518   Wed Aug 14, 2024   1903 WBC(!): 20.50  25 7 days ago, 17.5 5 days ago. Has had persistent leukocytosis with some elevation today compared to last CBC.    1903 Patient was placed on 2L NC during my evaluation for SpO2 85-88% when sleeping. When aroused, would spontaneously return to 97-99%    1911 hs TnI 0hr(!): 218  Will delta; 1079 one week ago   1918 LACTIC ACID(!): 2.1  Blood cultures ordered    1921 Comprehensive metabolic panel(!)  BUN and Cr improved from previous CMP. No transaminitis to support shock liver. Still awaiting ammonia to for assessment of encephalopathy, but anticipate it will be WNL   2102 CT head without contrast  \"No acute intracranial abnormality.  \"   2102 CTA dissection protocol chest/abdomen/pelvis  Concerning for R sided pulmonary infiltrate with pleural effusion    2135 Delta 2hr hsTnI: -69   2136 \"No pulmonary embolism     No thoracic aortic dissection     Multifocal areas of groundglass density in the both lungs with central predominance and upper lobe predominant may be due to pneumonia, consider follow-up at 3 months to demonstrate resolution     Small right effusion, smaller left effusion, suggest volume overload and pulmonary congestion     No intra-abdominal fluid collection seen  Expected postoperative changes from the recent " "bifemoral bypass graft with fluid around the anastomotic site in the left inguinal region due to seroma.     There is no abscess or organized fluid collection in the groin area     Linear hypodensity seen in the posterior aspect of the graft (image 751 series 5) suggesting nonocclusive thrombus     Again noted is atherosclerotic disease in the descending thoracic aorta with the mesenteric arterial stenosis, left renal artery stenosis     Exophytic mass from the stomach measuring 2.5 x 5.1 cm, compatible with gastrointestinal stromal tumor     The study was marked in EPIC for immediate notification.     \"   2136 CTA dissection protocol chest/abdomen/pelvis   2148 The 30ml/kg fluid bolus was not given to the patient despite hypotension and/or significantly elevated lactate of = 4 and/or presence of septic shock due to: Concern for fluid/volume overload. The patient will be administered 500 ml of crystalloid fluid instead. Orders for this have been placed in [facility]. The patient may receive additional colloid or crystalloid fluids thereafter based on clinical condition.     Shawna Oconnor MD                            Initial Sepsis Screening       Row Name 08/14/24 2056                Is the patient's history suggestive of a new or worsening infection? Yes (Proceed)  -NV        Suspected source of infection suspect infection, source unknown  -NV        Indicate SIRS criteria Hyperthemia > 38.3C (100.9F) OR Hypothermia <36C (96.8F);Leukocytosis (WBC > 62867 IJL) OR Leukopenia (WBC <4000 IJL) OR Bandemia (WBC >10% bands)  -NV        Are two or more of the above signs & symptoms of infection both present and new to the patient? Yes (Proceed)  -NV        Assess for evidence of organ dysfunction: Are any of the below criteria present within 6 hours of suspected infection and SIRS criteria that are NOT considered to be chronic conditions? Lactate > 2.0  -NV        Date of presentation of severe sepsis 08/14/24  -NV        Time " "of presentation of severe sepsis 2056  -NV        Sepsis Note: Click \"NEXT\" below (NOT \"close\") to generate sepsis note based on above information. YES (proceed by clicking \"NEXT\")  -NV                  User Key  (r) = Recorded By, (t) = Taken By, (c) = Cosigned By      Initials Name Provider Type    EVER Oconnor MD Resident                  Default Flowsheet Data (Last 720 Hours)       Sepsis Reassess       Row Name 08/14/24 2148                   Repeat Volume Status and Tissue Perfusion Assessment Performed    Date of Reassessment: 08/14/24  -NV        Time of Reassessment: 2148  -NV        Sepsis Reassessment Note: Click \"NEXT\" below (NOT \"close\") to generate sepsis reassessment note. YES (proceed by clicking \"NEXT\")  -NV        Repeat Volume Status and Tissue Perfusion Assessment Performed --                  User Key  (r) = Recorded By, (t) = Taken By, (c) = Cosigned By      Initials Name Provider Type    EVER Oconnor MD Resident                  SBIRT 22yo+      Flowsheet Row Most Recent Value   DALE: How many times in the past year have you...    Used an illegal drug or used a prescription medication for non-medical reasons? Never Filed at: 08/14/2024 1744                  Medical Decision Making  Amount and/or Complexity of Data Reviewed  Labs: ordered. Decision-making details documented in ED Course.  Radiology: ordered and independent interpretation performed. Decision-making details documented in ED Course.    Risk  Prescription drug management.  Decision regarding hospitalization.      Patient is a 79 y.o. female  who presents to the ED with altered mental status.    Vital signs significant for hypoxia when asleep.  Requiring nasal cannula. Exam as listed above    Differential diagnosis includes but is not limited to pelvic encephalopathy, patient was for sepsis, soft tissue infection, abscess, pneumonia, UTI    Plan CBC, CMP, lactic acid, Pro-Kaveh, PT/INR, APTT, troponin, ammonia, chest x-ray, " UA.  Patient already seen by vascular surgery at bedside who noticed significant serosanguineous discharge from right femoral access site.  They are requesting CTA abdomen and pelvis.  Will do CTA dissection study to additionally rule out PE given patient's new onset hypoxia.     View ED course above for further discussion on patient workup.     On review of previous records see chart review in HPI above.    All labs reviewed and utilized in the medical decision making process  All radiology studies independently viewed by me and interpreted by the radiologist.  I reviewed all testing with the patient.     Upon re-evaluation patient is meeting criteria for severe sepsis.  Blood cultures were drawn.  Patient given fluid bolus however normotensive so less than 30 cc/kg was used.  Patient given Zosyn based off of prior sensitivities.  CT with right sided pulmonary infiltrate and right pleural effusion concerning for pneumonia.  Patient admitted to [facility].      Disposition  Final diagnoses:   Claudication in peripheral vascular disease (HCC)   Aortoiliac occlusive disease (HCC)   Wound drainage   Severe sepsis (HCC)   Mass of stomach   Pneumonia     Time reflects when diagnosis was documented in both MDM as applicable and the Disposition within this note       Time User Action Codes Description Comment    8/14/2024  6:35 PM ElvinShawna Add [I73.9] Claudication in peripheral vascular disease (HCC)     8/14/2024  6:35 PM ElvniRebeccacy Add [I74.09] Aortoiliac occlusive disease (HCC)     8/14/2024  6:35 PM ElvinShawna Add [T14.8XXA] Wound drainage     8/14/2024  8:57 PM ElvinRebeccacy Add [A41.9,  R65.20] Severe sepsis (HCC)     8/14/2024  9:36 PM ElvinShawna Add [K31.89] Mass of stomach     8/14/2024  9:49 PM ElvinShawna Add [J18.9] Pneumonia     8/15/2024  2:08 AM Philip Lopez Add [G93.41] Acute metabolic encephalopathy     8/15/2024  1:39 PM Silver Barron Add [S91.309A] Open wound of foot     8/15/2024  3:18 PM  Wili Adame Add [I50.22] Chronic HFrEF, LVEF 30%, LVIDd 4.6 cm, AHA Stage C     8/15/2024  3:18 PM Wili Adame Add [I65.21] Carotid stenosis, asymptomatic, right     8/15/2024  3:18 PM Wili Adame Add [E08.621,  L97.511] Diabetic ulcer of toe of right foot associated with diabetes mellitus due to underlying condition, limited to breakdown of skin ([facility])     8/15/2024  3:18 PM Wili Adame Add [I12.9,  N18.4] CKD stage 4 secondary to hypertension ([facility])     8/15/2024  3:18 PM Wili Adame [C49.A0] Gastrointestinal stromal tumor (GIST) ([facility])           ED Disposition       ED Disposition   Admit    Condition   Stable    Date/Time   Wed Aug 14, 2024 2149    Comment   Case was discussed with Dr. Lopez and the patient's admission status was agreed to be Admission Status: inpatient status to the service of Dr. Lopez .               Follow-up Information    None         Current Discharge Medication List        CONTINUE these medications which have NOT CHANGED    Details   acetaminophen (TYLENOL) 500 mg tablet Take 650 mg by mouth every 6 (six) hours as needed for mild pain      allopurinol (ZYLOPRIM) 100 mg tablet TAKE 1 TABLET BY MOUTH EVERY DAY  Qty: 90 tablet, Refills: 1    Associated Diagnoses: Hyperuricemia      apixaban (ELIQUIS) 5 mg Take 1 tablet (5 mg total) by mouth 2 (two) times a day    Associated Diagnoses: Gangrene of toe of right foot ([facility])      B-D ULTRAFINE III SHORT PEN 31G X 8 MM MISC USE TWICE A DAY  Qty: 180 each, Refills: 2    Associated Diagnoses: Type 2 diabetes mellitus with hyperglycemia, with long-term current use of insulin ([facility])      Blood Glucose Monitoring Suppl (OneTouch Verio Reflect) w/Device KIT Check blood sugars three times daily. Please substitute with appropriate alternative as covered by patient's insurance. Dx: E11.65  Qty: 1 kit, Refills: 0    Comments: Please substitute with appropriate alternative as covered by patient's insurance  Associated  Diagnoses: Type 2 diabetes mellitus with diabetic peripheral angiopathy without gangrene, without long-term current use of insulin ([facility])      cinacalcet (SENSIPAR) 30 mg tablet TAKE 1 TABLET (30 MG TOTAL) BY MOUTH EVERY OTHER DAY  Qty: 45 tablet, Refills: 3    Associated Diagnoses: Stage 4 chronic kidney disease ([facility]); Secondary hyperparathyroidism of renal origin ([facility]); Hypercalcemia      Diclofenac Sodium (VOLTAREN) 1 % Apply 2 g topically 4 (four) times a day      escitalopram (LEXAPRO) 20 mg tablet Take 1 tablet (20 mg total) by mouth daily  Qty: 90 tablet, Refills: 1    Associated Diagnoses: Acute CVA (cerebrovascular accident) ([facility])      Glucagon 1 MG/0.2ML SOAJ Inject 1 mg under the skin if needed (low blood sugar)      glucose blood (OneTouch Verio) test strip Check blood sugars three times daily. Please substitute with appropriate alternative as covered by patient's insurance. Dx: E11.65  Qty: 300 each, Refills: 3    Comments: Please substitute with appropriate alternative as covered by patient's insurance  Associated Diagnoses: Type 2 diabetes mellitus with diabetic peripheral angiopathy without gangrene, without long-term current use of insulin ([facility])      insulin detemir (LEVEMIR) 100 units/mL subcutaneous injection Inject 10 Units under the skin in the morning    Associated Diagnoses: Gangrene of toe of right foot ([facility])      metoprolol succinate (TOPROL-XL) 25 mg 24 hr tablet Take 1 tablet (25 mg total) by mouth daily    Associated Diagnoses: Gangrene of toe of right foot ([facility])      OneTouch Delica Lancets 33G MISC Check blood sugars three times daily. Please substitute with appropriate alternative as covered by patient's insurance. Dx: E11.65  Qty: 300 each, Refills: 3    Comments: Please substitute with appropriate alternative as covered by patient's insurance  Associated Diagnoses: Type 2 diabetes mellitus with diabetic peripheral angiopathy without gangrene, without long-term current use of insulin  (HCC)      oxyCODONE (ROXICODONE) 5 immediate release tablet Take 1 tablet (5 mg total) by mouth every 6 (six) hours as needed for severe pain for up to 10 days ** FOR SNF USE ONLY ** Max Daily Amount: 20 mg  Qty: 10 tablet, Refills: 0    Associated Diagnoses: Gangrene of toe of right foot (HCC)      pantoprazole (PROTONIX) 40 mg tablet Take 1 tablet (40 mg total) by mouth 2 (two) times a day    Associated Diagnoses: Melena      pravastatin (PRAVACHOL) 80 mg tablet TAKE 1 TABLET BY MOUTH DAILY WITH DINNER  Qty: 90 tablet, Refills: 2    Associated Diagnoses: Hyperlipidemia associated with type 2 diabetes mellitus  (HCC)      sodium bicarbonate 650 mg tablet Take 1 tablet (650 mg total) by mouth 2 (two) times daily after meals    Associated Diagnoses: Gangrene of toe of right foot (HCC)      ticagrelor (BRILINTA) 90 MG Take 1 tablet (90 mg total) by mouth every 12 (twelve) hours  Qty: 60 tablet, Refills: 5    Associated Diagnoses: CVA (cerebral vascular accident) (HCC)      torsemide (DEMADEX) 10 mg tablet Take 1 tablet (10 mg total) by mouth daily    Associated Diagnoses: Gangrene of toe of right foot (HCC)           No discharge procedures on file.    PDMP Review         Value Time User    PDMP Reviewed  Yes 8/15/2024 10:07 PM Wili Adame MD             ED Provider  Attending physically available and evaluated Anamaria Parnell. I managed the patient along with the ED Attending.    Electronically Signed by           Shawna Oconnor MD  08/16/24 0518       Shawna Oconnor MD  08/16/24 0519

## 2024-08-15 NOTE — CONSULTS
Consultation - Infectious Disease   Anamaria Parnell 79 y.o. female MRN: [id number]  Unit/Bed#: [facility] 726-01 Encounter: [id number]      IMPRESSION & RECOMMENDATIONS:     1.  SIRS versus sepsis.  Patient with leukocytosis, mild lactic acidosis, tachycardia.  However white blood cell count elevated during prior admission and remained elevated at time of discharge.  She is afebrile, hemodynamically stable. CTA C/A/P showed multifocal areas of groundglass density in both lungs possibly due to pneumonia, postoperative changes from the recent bifemoral bypass graft with fluid around the anastomotic site in the left inguinal region due to seroma, exophytic mass from the stomach compatible with gastrointestinal stromal tumor.  UA unremarkable.  Consideration for aspiration pneumonitis versus pneumonia, reactive leukocytosis and SIRS related to recent surgeries, open foot and groin wounds.  Procalcitonin is mildly elevated but not particularly reliable in the setting of CKD   -Stop IV Zosyn   -Start IV ceftriaxone 1 g every 24 hours for possible pneumonia   -Follow-up blood cultures   -Follow-up procalcitonin tomorrow, if remains stable and blood cultures negative we will consider discontinuing antibiotics   -Repeat CBC tomorrow to monitor for worsening infection   -Monitor fever curve    2.  Acute encephalopathy.  CT head without acute abnormality, shows chronic left PCA territory infarct.  May be multifactorial related to poor p.o. intake, delirium from recent prolonged hospitalization, possible infection.   -Antibiotics as above for now   -Supportive care per primary team    3.  Right hallux dry gangrene.  In setting of severe PAD with chronic limb ischemia.  Status post right hallux amputation and partial first ray resection 7/29/2024.  Tissue culture grew Morganella and the patient completed 7 days of postoperative antibiotics.  The wound is still open and there is some necrotic tissue but no obvious signs of  infection.   -Recommend podiatry evaluation   -Continue local wound care    4.  PAD. Status post bilateral femoral endarterectomy with left to right femorofemoral PTFE bypass and retrograde left JACI, left EIA stenting, and bilateral groin VAC placement on 7/24/2024. CTA shows postoperative changes from the recent bifemoral bypass graft with fluid around the anastomotic site in the left inguinal region due to seroma.  Per vascular surgery low concern for active infection.  There is slough and fat necrosis in the right groin wound which does not appear acutely infected.  Per discussion with vascular the patient and family do not desire further intervention.   -Vascular surgery follow-up ongoing   -Palliative care consulted    5.  Type 2 diabetes mellitus.  Relatively well-controlled.  Continue glycemic control per primary team.    6.  CKD 3.  Baseline creatinine 1.3-1.8.  Creatinine is currently at baseline.  Dose adjust antibiotics as needed for creatinine clearance and monitor creatinine.    7.  Gastrointestinal stromal tumor.    I have discussed the above management plan to adjust antibiotics with the SLIM AP Moi. ID will follow.    I have performed an extensive review of the medical records in Epic including review of the notes, radiographs, and laboratory results     HISTORY OF PRESENT ILLNESS:  Reason for Consult: SIRS, lethargy  HPI: Anamaria Parnell is a 79 y.o. woman with a history of CAD, heart failure with reduced ejection fraction, anemia, CVA, CKD, atrial fibrillation, diabetes mellitus who was brought to the hospital from [facility] for increasing lethargy over several days.  The patient was recently admitted to [facility] 7/19 to 8/9 for right hallux gangrene due to AD with chronic limb ischemia. She underwent bilateral femoral endarterectomy with left to right femorofemoral PTFE bypass and retrograde left JACI, left EIA stenting, and bilateral groin VAC placement on 7/24/2024.  Patient was  taken to the OR on 7/29/2024 for right hallux amputation and partial first ray resection.  The wound is still open.  Patient received a 7-day postop course of antibiotics with IV ceftriaxone.  She was discharged to short-term rehab 8/9.  Since being at rehab the patient has had progressive fatigue, lethargy, poor p.o. intake.  She was reportedly putting food in her mouth but not swallowing.  On presentation to the ED she was afebrile with a white blood cell count of 20.5.  White blood cell count at the time of discharge was 17.8.  Labs were also notable for lactate of 2.1, procalcitonin 0.52.  UA does not show any pyuria.  CTA C/A/P showed multifocal areas of groundglass density in both lungs possibly due to pneumonia, postoperative changes from the recent bifemoral bypass graft with fluid around the anastomotic site in the left inguinal region due to seroma, exophytic mass from the stomach compatible with gastrointestinal stromal tumor.  She was seen by vascular surgery who of low concern for infection related to recent vascular surgery.  Patient was started on IV Zosyn for possible pneumonia.  The patient remains lethargic today.  She is intermittently arousable to voice, denies any pain.  She is requiring 2 L nasal cannula O2.    REVIEW OF SYSTEMS:  Unable to obtain review of symptoms due to poor mental status    PAST MEDICAL HISTORY:  Past Medical History:   Diagnosis Date    Aphasia as late effect of cerebrovascular accident (CVA) 08/16/2023    Arthritis     Chronic kidney disease     Diabetes mellitus (HCC)     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/20/2021    Endometriosis     High cholesterol     History of left common carotid artery stent placement 10/27/2023    Hypertension     Kidney disease, chronic, stage III (moderate, EGFR 30-59 ml/min) ([facility])     Lumbar disc herniation     Median arcuate ligament syndrome (HCC) 11/05/2019    Neuropathy     Obesity (BMI 30-39.9)  12/04/2017    Obesity, morbid (HCC) 01/20/2021    Peripheral vascular disease (HCC)     Pneumonia     Shoulder injury     left    Spinal stenosis     Stroke (HCC) 2015    Memory loss     Past Surgical History:   Procedure Laterality Date    ARTERIOGRAM Bilateral 7/24/2024    Procedure: ARTERIOGRAM;  Surgeon: Jose Crum DO;  Location: [facility];  Service: Vascular    CARDIAC CATHETERIZATION      CAROTID STENT Left 9/7/2023    Procedure: L TCAR;  Surgeon: Nicole Gallo MD;  [facility]: [facility];  Service: Vascular    COLONOSCOPY      FL RETROGRADE PYELOGRAM  4/6/2020    FRACTURE SURGERY Right     ankle    IR CAROTID STENT  9/7/2023    IR LOWER EXTREMITY ANGIOGRAM  7/24/2024    OVARIAN CYST SURGERY      UT CYSTO BLADDER W/URETERAL CATHETERIZATION Bilateral 4/6/2020    Procedure: CYSTOSCOPY WITH RETROGRADE PYELOGRAM;  Surgeon: Robert Mitchell MD;  [facility];  Service: Urology    UT CYSTO W/INSERT URETERAL STENT Right 4/6/2020    Procedure: INSERTION STENT URETERAL;  Surgeon: Robert Mitchell MD;  [facility];  Service: Urology    UT CYSTO W/REMOVAL OF TUMORS SMALL N/A 4/6/2020    Procedure: TRANSURETHRAL RESECTION OF BLADDER TUMOR (TURBT);  Surgeon: Robert Mitchell MD;  [facility];  Service: Urology    UT TEAEC W/WO PATCH GRAFT COMMON FEMORAL Bilateral 7/24/2024    Procedure: Bilateral femoral endarterectomies with patch angioplasty; Retrograde iliac intervention, shockwave, stent placement and angioplasty; femoral to femoral bypass with PTFE;  Surgeon: Jose Crum DO;  Location[facility];  Service: Vascular    ROTATOR CUFF REPAIR Left     TOE AMPUTATION Right 7/29/2024    Procedure: Rigth Hallux amputation, partial 1st ray resection;  Surgeon: Eddie Farooq DPM;  Location: [facility];  Service: Podiatry    TONSILLECTOMY         FAMILY HISTORY:  Non-contributory    SOCIAL HISTORY:  Social History   Social History     Substance and Sexual Activity   Alcohol Use  Never     Social History     Substance and Sexual Activity   Drug Use Never     Social History     Tobacco Use   Smoking Status Former    Current packs/day: 0.00    Average packs/day: 2.0 packs/day for 54.6 years (109.1 ttl pk-yrs)    Types: Cigarettes    Start date: 1966    Quit date: 2020    Years since quittin.0   Smokeless Tobacco Never       ALLERGIES:  Allergies   Allergen Reactions    Fenofibrate Other (See Comments)      blood in urine  hx  Kidney Failure    Colesevelam Other (See Comments)      leg pains    Colestipol Itching and Other (See Comments)      Swelling lower legs    Ezetimibe GI Intolerance    Statins Myalgia       MEDICATIONS:  All current active medications have been reviewed.    PHYSICAL EXAM:  Temp:  [96 °F (35.6 °C)-97.8 °F (36.6 °C)] 97.8 °F (36.6 °C)  HR:  [74-93] 86  Resp:  [14-20] 16  BP: (120-144)/(58-77) 134/68  SpO2:  [93 %-100 %] 97 %  Temp (24hrs), Av.4 °F (36.3 °C), Min:96 °F (35.6 °C), Max:97.8 °F (36.6 °C)  Current: Temperature: 97.8 °F (36.6 °C)    Intake/Output Summary (Last 24 hours) at 8/15/2024 1356  Last data filed at 8/15/2024 1349  Gross per 24 hour   Intake 250 ml   Output 150 ml   Net 100 ml       General Appearance:  Chronically ill-appearing, lethargic   Head:  Normocephalic, without obvious abnormality, atraumatic   Eyes:  Conjunctiva pink and sclera anicteric, both eyes   Nose: Nares normal, mucosa normal, no drainage   Throat: Oropharynx moist without lesions   Neck: Supple, symmetrical, no adenopathy, no tenderness/mass/nodules   Back:   Symmetric, no curvature, ROM normal, no CVA tenderness   Lungs:   Decreased breath sounds at the lung bases otherwise clear to auscultation   Chest Wall:  No tenderness or deformity   Heart:  RRR; no murmur, rub or gallop   Abdomen:   Soft, non-tender, non-distended, positive bowel sounds    Extremities: Status post right hallux amputation site with some necrotic appearing tissue, no purulence or cellulitis    Skin: No rashes or lesions.  Right groin wound with packing in place, slough in the wound bed   Lymph nodes: Cervical, supraclavicular nodes normal   Neurologic: Lethargic, intermittently arousable       LABS, IMAGING, & OTHER STUDIES:  Lab Results:  I have personally reviewed pertinent labs.  Results from last 7 days   Lab Units 08/15/24  0450 08/14/24  1836 08/09/24  0554   WBC Thousand/uL 16.48* 20.50* 17.89*   HEMOGLOBIN g/dL 7.1* 8.4* 8.0*   PLATELETS Thousands/uL 221 242 265     Results from last 7 days   Lab Units 08/15/24  0450 08/14/24  1836 08/09/24  0554   SODIUM mmol/L 148* 147 145   POTASSIUM mmol/L 3.2* 3.6 3.1*   CHLORIDE mmol/L 104 104 103   CO2 mmol/L 33* 34* 33*   BUN mg/dL 47* 51* 72*   CREATININE mg/dL 1.51* 1.59* 1.77*   EGFR ml/min/1.73sq m 33 31 27   CALCIUM mg/dL 7.5* 7.8* 8.3*   AST U/L 13 16 16   ALT U/L 9 10 18   ALK PHOS U/L 86 94 80     Results from last 7 days   Lab Units 08/14/24  1930   BLOOD CULTURE  Received in Microbiology Lab. Culture in Progress.  Received in Microbiology Lab. Culture in Progress.     Results from last 7 days   Lab Units 08/14/24 1836   PROCALCITONIN ng/ml 0.52*                   Imaging Studies:   I have personally reviewed pertinent imaging study reports and images in PACS.  CTA C/A/P-multifocal areas of groundglass density in both lungs, small pleural effusions, expected postoperative changes from recent bifemoral bypass graft with seroma around the anastomotic site in the left inguinal region

## 2024-08-15 NOTE — ASSESSMENT & PLAN NOTE
Wt Readings from Last 3 Encounters:   08/15/24 56.4 kg (124 lb 5.4 oz)   08/12/24 68 kg (150 lb)   08/09/24 68.1 kg (150 lb 2.1 oz)     Most recent echo in July 2024 with LVEF 30% which was down from echo in 2023  On PTA torsemide 10 mg daily  Although noted pulmonary venous congestion on CT imaging, patient with severe sepsis as above, received 500cc Isolyte bolus  Continue holding torsemide for now

## 2024-08-15 NOTE — ASSESSMENT & PLAN NOTE
Recent hospitalization for bilateral femoral endarterectomy with bypass and JACI/EIA stenting as well as right first toe amputation  Was evaluated by Vascular Surgery on admission who did not feel that wound infection was present  Continue Brilinta as well as statin

## 2024-08-15 NOTE — QUICK NOTE
The 30ml/kg fluid bolus was not given to the patient despite hypotension and/or significantly elevated lactate of ? 4 and/or presence of septic shock due to: Heart Failure. The patient will be administered 500 ml of crystalloid fluid instead. Orders for this have been placed in [facility]. The patient may receive additional colloid or crystalloid fluids thereafter based on clinical condition.     Philip Lopez, DO

## 2024-08-16 ENCOUNTER — HOME CARE VISIT (OUTPATIENT)
Dept: HOME HEALTH SERVICES | Facility: HOME HEALTHCARE | Age: 79
End: 2024-08-16

## 2024-08-16 PROBLEM — Z51.5 PALLIATIVE CARE BY SPECIALIST: Status: ACTIVE | Noted: 2024-01-01

## 2024-08-16 PROBLEM — N18.9 CHRONIC KIDNEY DISEASE: Status: ACTIVE | Noted: 2024-01-01

## 2024-08-16 PROBLEM — E87.0 HYPERNATREMIA: Status: ACTIVE | Noted: 2024-01-01

## 2024-08-16 NOTE — PLAN OF CARE
Problem: Potential for Falls  Goal: Patient will remain free of falls  Description: INTERVENTIONS:  - Educate patient/family on patient safety including physical limitations  - Instruct patient to call for assistance with activity   - Consult OT/PT to assist with strengthening/mobility   - Keep Call bell within reach  - Keep bed low and locked with side rails adjusted as appropriate  - Keep care items and personal belongings within reach  - Initiate and maintain comfort rounds  - Make Fall Risk Sign visible to staff  - Offer Toileting every 2 Hours, in advance of need  - Initiate/Maintain bed alarm  - Apply yellow socks and bracelet for high fall risk patients  - Consider moving patient to room near nurses station  Outcome: Progressing     Problem: INFECTION - ADULT  Goal: Absence or prevention of progression during hospitalization  Description: INTERVENTIONS:  - Assess and monitor for signs and symptoms of infection  - Monitor lab/diagnostic results  - Monitor all insertion sites, i.e. indwelling lines, tubes, and drains  - Monitor endotracheal if appropriate and nasal secretions for changes in amount and color  - [place] appropriate cooling/warming therapies per order  - Administer medications as ordered  - Instruct and encourage patient and family to use good hand hygiene technique  - Identify and instruct in appropriate isolation precautions for identified infection/condition  Outcome: Progressing  Goal: Absence of fever/infection during neutropenic period  Description: INTERVENTIONS:  - Monitor WBC    Outcome: Progressing     Problem: SAFETY ADULT  Goal: Patient will remain free of falls  Description: INTERVENTIONS:  - Educate patient/family on patient safety including physical limitations  - Instruct patient to call for assistance with activity   - Consult OT/PT to assist with strengthening/mobility   - Keep Call bell within reach  - Keep bed low and locked with side rails adjusted as appropriate  - Keep  care items and personal belongings within reach  - Initiate and maintain comfort rounds  - Make Fall Risk Sign visible to staff  - Offer Toileting every 2 Hours, in advance of need  - Initiate/Maintain bed alarm  - Apply yellow socks and bracelet for high fall risk patients  - Consider moving patient to room near nurses station  Outcome: Progressing  Goal: Maintain or return to baseline ADL function  Description: INTERVENTIONS:  -  Assess patient's ability to carry out ADLs; assess patient's baseline for ADL function and identify physical deficits which impact ability to perform ADLs (bathing, care of mouth/teeth, toileting, grooming, dressing, etc.)  - Assess/evaluate cause of self-care deficits   - Assess range of motion  - Assess patient's mobility; develop plan if impaired  - Assess patient's need for assistive devices and provide as appropriate  - Encourage maximum independence but intervene and supervise when necessary  - Involve family in performance of ADLs  - Assess for home care needs following discharge   - Consider OT consult to assist with ADL evaluation and planning for discharge  - Provide patient education as appropriate  Outcome: Progressing  Goal: Maintains/Returns to pre admission functional level  Description: INTERVENTIONS:  - Perform AM-PAC 6 Click Basic Mobility/ Daily Activity assessment daily.  - Set and communicate daily mobility goal to care team and patient/family/caregiver.   - Collaborate with rehabilitation services on mobility goals if consulted  - Perform Range of Motion 4 times a day.  - Reposition patient every 2 hours.  - Dangle patient 4 times a day  - Stand patient 4 times a day  - Ambulate patient 4 times a day  - Out of bed to chair 4 times a day   - Out of bed for meals 4 times a day  - Out of bed for toileting  - Record patient progress and toleration of activity level   Outcome: Progressing     Problem: DISCHARGE PLANNING  Goal: Discharge to home or other facility with  appropriate resources  Description: INTERVENTIONS:  - Identify barriers to discharge w/patient and caregiver  - Arrange for needed discharge resources and transportation as appropriate  - Identify discharge learning needs (meds, wound care, etc.)  - Arrange for interpretive services to assist at discharge as needed  - Refer to Case Management Department for coordinating discharge planning if the patient needs post-hospital services based on physician/advanced practitioner order or complex needs related to functional status, cognitive ability, or social support system  Outcome: Progressing     Problem: Knowledge Deficit  Goal: Patient/family/caregiver demonstrates understanding of disease process, treatment plan, medications, and discharge instructions  Description: Complete learning assessment and assess knowledge base.  Interventions:  - Provide teaching at level of understanding  - Provide teaching via preferred learning methods  Outcome: Progressing     Problem: NEUROSENSORY - ADULT  Goal: Achieves stable or improved neurological status  Description: INTERVENTIONS  - Monitor and report changes in neurological status  - Monitor vital signs such as temperature, blood pressure, glucose, and any other labs ordered   - Initiate measures to prevent increased intracranial pressure  - Monitor for seizure activity and implement precautions if appropriate      Outcome: Progressing  Goal: Achieves maximal functionality and self care  Description: INTERVENTIONS  - Monitor swallowing and airway patency with patient fatigue and changes in neurological status  - Encourage and assist patient to increase activity and self care.   - Encourage visually impaired, hearing impaired and aphasic patients to use assistive/communication devices  Outcome: Progressing     Problem: RESPIRATORY - ADULT  Goal: Achieves optimal ventilation and oxygenation  Description: INTERVENTIONS:  - Assess for changes in respiratory status  - Assess for  changes in mentation and behavior  - Position to facilitate oxygenation and minimize respiratory effort  - Oxygen administered by appropriate delivery if ordered  - Initiate smoking cessation education as indicated  - Encourage broncho-pulmonary hygiene including cough, deep breathe, Incentive Spirometry  - Assess the need for suctioning and aspirate as needed  - Assess and instruct to report SOB or any respiratory difficulty  - Respiratory Therapy support as indicated  Outcome: Progressing     Problem: GENITOURINARY - ADULT  Goal: Maintains or returns to baseline urinary function  Description: INTERVENTIONS:  - Assess urinary function  - Encourage oral fluids to ensure adequate hydration if ordered  - Administer IV fluids as ordered to ensure adequate hydration  - Administer ordered medications as needed  - Offer frequent toileting  - Follow urinary retention protocol if ordered  Outcome: Progressing     Problem: METABOLIC, FLUID AND ELECTROLYTES - ADULT  Goal: Electrolytes maintained within normal limits  Description: INTERVENTIONS:  - Monitor labs and assess patient for signs and symptoms of electrolyte imbalances  - Administer electrolyte replacement as ordered  - Monitor response to electrolyte replacements, including repeat lab results as appropriate  - Instruct patient on fluid and nutrition as appropriate  Outcome: Progressing  Goal: Fluid balance maintained  Description: INTERVENTIONS:  - Monitor labs   - Monitor I/O and WT  - Instruct patient on fluid and nutrition as appropriate  - Assess for signs & symptoms of volume excess or deficit  Outcome: Progressing  Goal: Glucose maintained within target range  Description: INTERVENTIONS:  - Monitor Blood Glucose as ordered  - Assess for signs and symptoms of hyperglycemia and hypoglycemia  - Administer ordered medications to maintain glucose within target range  - Assess nutritional intake and initiate nutrition service referral as needed  Outcome:  Progressing     Problem: HEMATOLOGIC - ADULT  Goal: Maintains hematologic stability  Description: INTERVENTIONS  - Assess for signs and symptoms of bleeding or hemorrhage  - Monitor labs  - Administer supportive blood products/factors as ordered and appropriate  Outcome: Progressing     Problem: Prexisting or High Potential for Compromised Skin Integrity  Goal: Skin integrity is maintained or improved  Description: INTERVENTIONS:  - Identify patients at risk for skin breakdown  - Assess and monitor skin integrity  - Assess and monitor nutrition and hydration status  - Monitor labs   - Assess for incontinence   - Turn and reposition patient  - Assist with mobility/ambulation  - Relieve pressure over bony prominences  - Avoid friction and shearing  - Provide appropriate hygiene as needed including keeping skin clean and dry  - Evaluate need for skin moisturizer/barrier cream  - Collaborate with interdisciplinary team   - Patient/family teaching  - Consider wound care consult   Outcome: Progressing

## 2024-08-16 NOTE — ASSESSMENT & PLAN NOTE
Lab Results   Component Value Date    EGFR 26 08/16/2024    EGFR 33 08/15/2024    EGFR 31 08/14/2024    CREATININE 1.79 (H) 08/16/2024    CREATININE 1.51 (H) 08/15/2024    CREATININE 1.59 (H) 08/14/2024     Creatinine at baseline and continue to monitor  Nephrology consulted as below

## 2024-08-16 NOTE — PROGRESS NOTES
[facility] Podiatry - Progress Note  Patient: Anamaria Parnell 79 y.o. female   MRN: [id number]  PCP: Ritchie Lawton MD  Unit/Bed#: [facility] 726-01 Encounter: [id number]  Date Of Visit: 08/16/24    ASSESSMENT:    Anamaria Parnell is a 79 y.o. 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  A new Betadine soaked Adaptic dresser dressing was placed to the right foot wound  Plan to continue Betadine soaked dry sterile dressing to right foot wound to keep it stable and dry during this inpatient visit.  No plan for podiatric surgical intervention at this time.  Patient's son is understanding of this treatment plan and will maintain contact with him during inpatient visit if anything changes.  Reviewed patient's vitals and labs.  Of note patient is leukocytic and afebrile.  Leukocytosis is trending down.  Blood cultures are still preliminary at 24 hours.  Patient currently on ceftriaxone.  Patient discharge plan is pending hospice consult.  Podiatry will continue to manage dressings at this time.  Elevation on green foam wedges or pillows when non-ambulatory.  Rest of care per primary team.    Weight bearing status: Weightbearing as tolerated to right foot to the heel for transfers      SUBJECTIVE:     The patient was seen, evaluated, and assessed at bedside today. The patient was awake and in no acute events overnight. The patient is alert but unaware of what is happening when questioned about foot.  Patient's son was not in the room during today's visit.  Patient denies N/V/F/chills/SOB/CP and mentions no pain.      OBJECTIVE:  "    Vitals:   /70   Pulse 103   Temp 98.2 °F (36.8 °C)   Resp 16   Ht 4' 11\" (1.499 m)   Wt 56.4 kg (124 lb 5.4 oz)   LMP  (LMP Unknown)   SpO2 97%   BMI 25.11 kg/m²     Temp (24hrs), Av.3 °F (36.8 °C), Min:97.8 °F (36.6 °C), Max:98.7 °F (37.1 °C)      Physical Exam:     General:  Alert, cooperative, and in no distress.  Lower extremity exam:  Cardiovascular status at baseline.  Neurological status at baseline.  Musculoskeletal status at baseline. No calf tenderness noted.     Lower extremity wound(s) as noted below:  Location: Right first metatarsal, status post partial first ray amputation  Length 5cm: Width 3cm: Depth 2cm:   Deepest Tissue Noted in Base: bone  Probe to Bone: Yes  Peripheral Skin Description: Attached  Granulation: 30% Fibrotic Tissue: 40% Necrotic Tissue: 30%   Drainage Amount: minimal  Signs of Infection: Yes , probe to bone. NO cellulitis/lymphangitis/crepitus. NO odor, purulence noted      Additional Data:     Labs:    Results from last 7 days   Lab Units 24  0529 08/15/24  0450   WBC Thousand/uL 15.81* 16.48*   HEMOGLOBIN g/dL 7.3* 7.1*   HEMATOCRIT % 26.9* 25.1*   PLATELETS Thousands/uL 219 221   SEGS PCT %  --  88*   LYMPHO PCT %  --  6*   MONO PCT %  --  4   EOS PCT %  --  1     Results from last 7 days   Lab Units 24  0529 08/15/24  0450   POTASSIUM mmol/L 3.3* 3.2*   CHLORIDE mmol/L 108 104   CO2 mmol/L 31 33*   BUN mg/dL 48* 47*   CREATININE mg/dL 1.79* 1.51*   CALCIUM mg/dL 8.0* 7.5*   ALK PHOS U/L  --  86   ALT U/L  --  9   AST U/L  --  13     Results from last 7 days   Lab Units 08/15/24  0450   INR  2.21*       * I Have Reviewed All Lab Data Listed Above.    Recent Cultures (last 7 days):     Results from last 7 days   Lab Units 24  1930   BLOOD CULTURE  No Growth at 24 hrs.  No Growth at 24 hrs.               ** Please Note: Portions of the record may have been created with voice recognition software. Occasional wrong word or \"sound a like\" " substitutions may have occurred due to the inherent limitations of voice recognition software. Read the chart carefully and recognize, using context, where substitutions have occurred. **

## 2024-08-16 NOTE — ASSESSMENT & PLAN NOTE
Palliative Diagnosis: severe sepsis, CVA, GIST, CKD4, HFrEF    Goals:   Ongoing minimally invasive medical management with limit of DNR/DNI at this time  Hospice consult placed and pending. Son's goal is to bring patient home.  Son is considering transition to level 4 comfort care pending patient's tolerance of minimally invasive medical optimization while awaiting hospice d/c.     Social Support:  Supportive listening provided  Normalized experience of patient  Provided anxiety containment  Advocated for patient/family with interdisciplinary team  Mediated conflict  Supported primarily by Austin singleton.    Care Coordination  Case discussed with areli ESQUIVEL at bedside    Follow-up  We appreciate the opportunity to participate in this patient's care.   We will continue to follow while admitted.  Will return on Monday 8/19 or sooner with questions or concerns.  Please do not hesitate to contact our on-call provider through [employer] Secure Chat or contact [phone] should there be an acute change or other symptom control concerns.

## 2024-08-16 NOTE — ASSESSMENT & PLAN NOTE
CT imaging with evidence of GIST  Reviewed last prolonged hospitalization in which patient was noted to have melena and it does appear that this GIST was noted on CTA imaging on 7/18; does also appear from notes that the GI team who was seeing patient during last hospitalization.   Consider GI team consultation this hospitalization   Call patient  The HPV is positive but we are still waiting for the pap result  Thanks.

## 2024-08-16 NOTE — PROGRESS NOTES
Progress Note - Infectious Disease   Anamaria Parnell 79 y.o. female MRN: [id number]  Unit/Bed#: [facility] 726-01 Encounter: [id number]      Impression/Plan:    1.  SIRS.  Patient with leukocytosis, mild lactic acidosis, tachycardia.  However white blood cell count elevated during prior admission and remained elevated at time of discharge.  She is afebrile, hemodynamically stable. CTA C/A/P showed multifocal areas of groundglass density in both lungs possibly due to pneumonia, postoperative changes from the recent bifemoral bypass graft with fluid around the anastomotic site in the left inguinal region due to seroma, exophytic mass from the stomach compatible with gastrointestinal stromal tumor.  UA unremarkable. Higher concern for aspiration pneumonitis rather than pneumonia as she has minimal respiration symptoms, was quickly weaned to room air. Procalcitonin is mildly elevated but not particularly reliable in the setting of CKD. Leukocytosis and SIRS likely also related to recent surgeries, open foot and groin wounds.                -stop Ceftriaxone and monitor off antibiotics               -Follow-up blood cultures              -Monitor fever curve, respiratory symptoms     2.  Acute encephalopathy.  CT head without acute abnormality, shows chronic left PCA territory infarct.  May be multifactorial related to poor p.o. intake, delirium from recent prolonged hospitalization, hypernatremia, aspiration.              -Supportive care per primary team     3.  Right hallux dry gangrene.  In setting of severe PAD with chronic limb ischemia.  Status post right hallux amputation and partial first ray resection 7/29/2024.  Tissue culture grew Morganella and the patient completed 7 days of postoperative antibiotics.  The wound is still open and there is some necrotic tissue but no obvious signs of infection.              -Continue local wound care     4.  PAD. Status post bilateral femoral endarterectomy with left to right  femorofemoral PTFE bypass and retrograde left JACI, left EIA stenting, and bilateral groin VAC placement on 2024. CTA shows postoperative changes from the recent bifemoral bypass graft with fluid around the anastomotic site in the left inguinal region due to seroma.  Per vascular surgery low concern for active infection.  There is slough and fat necrosis in the right groin wound which does not appear acutely infected.  Per discussion with vascular the patient and family do not desire further intervention and tentative plan is for transition to hospice.              -Vascular surgery follow-up               -Palliative care consulted   -continue Palliative wound care     5.  Type 2 diabetes mellitus.  Relatively well-controlled.  Continue glycemic control per primary team.     6.  CKD 3.  Baseline creatinine 1.3-1.8.  Creatinine is currently at baseline.  Dose adjust antibiotics as needed for creatinine clearance and monitor creatinine.     7.  Gastrointestinal stromal tumor. No further work up due to transition to hospice     I have discussed the above management plan to adjust antibiotics with the SLIM AP Moi. ID will see again 24 if still admitted, please call with questions.    Antibiotics:  D3 Antibiotics   Ceftriaxone    Subjective:  Per discussions with son and Palliative care, he is planning for transition to hospice. She was resting but upon arousal got very tearful and said she was sad. She denied pain, reports mild shortness of breath.    Objective:  Vitals:  Temp:  [98.2 °F (36.8 °C)-98.7 °F (37.1 °C)] 98.2 °F (36.8 °C)  HR:  [] 103  Resp:  [16-18] 18  BP: (112-136)/(60-76) 112/76  SpO2:  [96 %-100 %] 97 %  Temp (24hrs), Av.4 °F (36.9 °C), Min:98.2 °F (36.8 °C), Max:98.7 °F (37.1 °C)  Current: Temperature: 98.2 °F (36.8 °C)    Physical Exam:   General Appearance:  Alert, interactive, nontoxic, no acute distress.   Throat: Oropharynx moist without lesions.    Lungs:   Clear to  auscultation bilaterally; no wheezes, rhonchi or rales; respirations unlabored   Heart:  RRR; no murmur, rub or gallop   Abdomen:   Soft, non-tender, non-distended, positive bowel sounds.     Extremities: No clubbing, cyanosis or edema   Skin: No new rashes or lesions. No draining wounds noted.       Labs:   All pertinent labs and imaging studies were personally reviewed  Results from last 7 days   Lab Units 08/16/24  0529 08/15/24  0450 08/14/24  1836   WBC Thousand/uL 15.81* 16.48* 20.50*   HEMOGLOBIN g/dL 7.3* 7.1* 8.4*   PLATELETS Thousands/uL 219 221 242     Results from last 7 days   Lab Units 08/16/24  0529 08/15/24  0450 08/14/24  1836   SODIUM mmol/L 153* 148* 147   POTASSIUM mmol/L 3.3* 3.2* 3.6   CHLORIDE mmol/L 108 104 104   CO2 mmol/L 31 33* 34*   BUN mg/dL 48* 47* 51*   CREATININE mg/dL 1.79* 1.51* 1.59*   EGFR ml/min/1.73sq m 26 33 31   CALCIUM mg/dL 8.0* 7.5* 7.8*   AST U/L  --  13 16   ALT U/L  --  9 10   ALK PHOS U/L  --  86 94     Results from last 7 days   Lab Units 08/16/24  0529 08/14/24  1836   PROCALCITONIN ng/ml 0.43* 0.52*                   Micro:  Results from last 7 days   Lab Units 08/14/24  2345 08/14/24  1930   BLOOD CULTURE   --  No Growth at 24 hrs.  No Growth at 24 hrs.   MRSA CULTURE ONLY  No Methicillin Resistant Staphlyococcus aureus (MRSA) isolated  --        Imaging:  I have personally reviewed pertinent imaging study reports and images in PACS.  CTA C/A/P-multifocal areas of groundglass density in both lungs, small pleural effusions, expected postoperative changes from recent bifemoral bypass graft with seroma around the anastomotic site in the left inguinal region

## 2024-08-16 NOTE — RESTORATIVE TECHNICIAN NOTE
Restorative Technician Note      Patient Name: Anamaria Parnell     Note Type: Mobility (Pt on hold for activity per RN Mable.)    Rene TIJERINA, Restorative Technician,

## 2024-08-16 NOTE — ASSESSMENT & PLAN NOTE
"In the setting of suspected severe sepsis as above  CT head on admission revealed, \"No acute intracranial abnormality. Chronic left PCA territory infarct, stable.\"  Patient has reported baseline memory issues and son reported to admitting provider that she has been increasingly somnolent since prior hospitalization  Delirium precautions  "

## 2024-08-16 NOTE — PROGRESS NOTES
"[facility]  Progress Note  Name: Anamaria Parnell I  MRN: [id number]  Unit/Bed#: [facility] 726-01 I Date of Admission: 8/14/2024   Date of Service: 8/16/2024 I Hospital Day: 2    Assessment & Plan   * Severe sepsis (HCC)  Assessment & Plan  POA aeb tachycardia, leukocytosis, and elevated lactic acid level  Imaging concern for possible pneumonia  Procal elevated on admission. Improving  Leukocytosis improving.  Currently on IV ceftriaxone per ID recs  Blood cultures with no growth x2 after 24 hours  ID consult appreciated  Palliative care consult appreciated as well. Hospice consult pending   NPO per SLP recs    Acute metabolic encephalopathy  Assessment & Plan  In the setting of suspected severe sepsis as above  CT head on admission revealed, \"No acute intracranial abnormality. Chronic left PCA territory infarct, stable.\"  Patient has reported baseline memory issues and son reported to admitting provider that she has been increasingly somnolent since prior hospitalization  Delirium precautions    Chronic HFrEF, LVEF 30%, LVIDd 4.6 cm, AHA Stage C  Assessment & Plan  Wt Readings from Last 3 Encounters:   08/15/24 56.4 kg (124 lb 5.4 oz)   08/12/24 68 kg (150 lb)   08/09/24 68.1 kg (150 lb 2.1 oz)     Most recent echo in July 2024 with LVEF 30% which was down from echo in 2023  On PTA torsemide 10 mg daily  Although noted pulmonary venous congestion on CT imaging, patient with severe sepsis as above, received 500cc Isolyte bolus  Continue holding torsemide for now and giving IVF as below   Currently on room air          Aortoiliac occlusive disease (HCC)  Assessment & Plan  Recent hospitalization for bilateral femoral endarterectomy with bypass and JACI/EIA stenting as well as right first toe amputation  Was evaluated by Vascular Surgery on admission who did not feel that wound infection was present  Continue Brilinta as well as statin    CKD stage 4 secondary to hypertension " (HCC)  Assessment & Plan  Lab Results   Component Value Date    EGFR 26 08/16/2024    EGFR 33 08/15/2024    EGFR 31 08/14/2024    CREATININE 1.79 (H) 08/16/2024    CREATININE 1.51 (H) 08/15/2024    CREATININE 1.59 (H) 08/14/2024     Creatinine at baseline and continue to monitor  Nephrology consulted as below     Acute blood loss anemia  Assessment & Plan  During prior hospitalization noted to have melena as well as anemia requiring transfusion  Hemoglobin 7.3 today, down from 8.4 on admission. This in the setting of IVF   Appears baseline in the 8-9 range    Paroxysmal atrial fibrillation (HCC)  Assessment & Plan  Continue PTA metoprolol as well as Eliquis    Gastrointestinal stromal tumor (GIST) ([facility])  Assessment & Plan  CT imaging with evidence of GIST  Reviewed last prolonged hospitalization in which patient was noted to have melena and it does appear that this GIST was noted on CTA imaging on 7/18; does also appear from notes that the GI team who was seeing patient during last hospitalization.   Consider GI team consultation this hospitalization    Hypokalemia  Assessment & Plan  Replete and monitor  Mag WNL    H/o CVA  Assessment & Plan  CT head as above  On eliquis, Brilinta, and statin    Type 2 diabetes mellitus with diabetic nephropathy (HCC)  Assessment & Plan  Lab Results   Component Value Date    HGBA1C 5.9 (H) 07/09/2024       Recent Labs     08/15/24  1119 08/15/24  1701 08/16/24  0001 08/16/24  0450   POCGLU 165* 162* 194* 167*         Blood Sugar Average: Last 72 hrs:  (P) [id number]    Controlled based on recent A1c  Hold scheduled Lantus for now  Diet per SLP recs - currently NPO  Glucose checks and SSI coverage Q6hr for now as patient is currently NPO. If SLP clears for diet, would change to TID AC and QHS SSI    Elevated troponin  Assessment & Plan  Suspect non-MI troponin elevation in the setting of severe sepsis    Hypernatremia  Assessment & Plan  Sodium 153 this AM, in the setting  "of poor PO intake/NPO  Check imaging on admission did reveal \"Small right effusion, smaller left effusion, suggest volume overload and pulmonary congestion\"  Patient currently satting well on room air  Gentle IVF with D5W  Nephrology consulted           VTE Pharmacologic Prophylaxis: VTE Score: 5  Eliquis    Mobility:   Basic Mobility Inpatient Raw Score: 6  JH-HLM Goal: 2: Bed activities/Dependent transfer  JH-HLM Achieved: 1: Laying in bed  JH-HLM Goal NOT achieved. Continue with multidisciplinary rounding and encourage appropriate mobility to improve upon JH-HLM goals.    Patient Centered Rounds: I performed bedside rounds with nursing staff today.   Discussions with Specialists or Other Care Team Provider:      Education and Discussions with Family / Patient:  will call son today.   7404 UPDATE: Updated patient's son at bedside. All questions answered to the best of my ability.     Total Time Spent on Date of Encounter in care of patient: 35 mins. This time was spent on one or more of the following: performing physical exam; counseling and coordination of care; obtaining or reviewing history; documenting in the medical record; reviewing/ordering tests, medications or procedures; communicating with other healthcare professionals and discussing with patient's family/caregivers.    Current Length of Stay: 2 day(s)  Current Patient Status: Inpatient   Certification Statement: The patient will continue to require additional inpatient hospital stay due to IV abx, IVF, hospice consult  Discharge Plan:  pending hospice consult    Code Status: Level 3 - DNAR and DNI    Subjective:   Ms. Parnell denies any pain. She reports not being hungry or thirsty. She states she just wants to rest    Objective:     Vitals:   Temp (24hrs), Av.3 °F (36.8 °C), Min:97.8 °F (36.6 °C), Max:98.7 °F (37.1 °C)    Temp:  [97.8 °F (36.6 °C)-98.7 °F (37.1 °C)] 98.2 °F (36.8 °C)  HR:  [86-99] 99  Resp:  [16] 16  BP: " (112-136)/(60-70) 112/70  SpO2:  [96 %-100 %] 100 %  Body mass index is 25.11 kg/m².     Input and Output Summary (last 24 hours):     Intake/Output Summary (Last 24 hours) at 8/16/2024 0827  Last data filed at 8/16/2024 0800  Gross per 24 hour   Intake 50 ml   Output 350 ml   Net -300 ml       Physical Exam:   Physical Exam  Vitals reviewed.   Constitutional:       Comments: Patient seen lying in bed, was resting when I entered her room but awoke easily to her name being called. Chronically ill appearing but NAD   Cardiovascular:      Rate and Rhythm: Normal rate and regular rhythm.   Pulmonary:      Effort: Pulmonary effort is normal.      Breath sounds: Normal breath sounds.      Comments: 99% om room air  Abdominal:      General: Bowel sounds are normal.      Palpations: Abdomen is soft.      Tenderness: There is no abdominal tenderness.   Musculoskeletal:      Right lower leg: No edema.      Left lower leg: No edema.   Skin:     General: Skin is warm.   Psychiatric:         Mood and Affect: Mood normal.          Additional Data:     Labs:  Results from last 7 days   Lab Units 08/16/24  0529 08/15/24  0450   WBC Thousand/uL 15.81* 16.48*   HEMOGLOBIN g/dL 7.3* 7.1*   HEMATOCRIT % 26.9* 25.1*   PLATELETS Thousands/uL 219 221   SEGS PCT %  --  88*   LYMPHO PCT %  --  6*   MONO PCT %  --  4   EOS PCT %  --  1     Results from last 7 days   Lab Units 08/16/24  0529 08/15/24  0450   SODIUM mmol/L 153* 148*   POTASSIUM mmol/L 3.3* 3.2*   CHLORIDE mmol/L 108 104   CO2 mmol/L 31 33*   BUN mg/dL 48* 47*   CREATININE mg/dL 1.79* 1.51*   ANION GAP mmol/L 14* 11   CALCIUM mg/dL 8.0* 7.5*   ALBUMIN g/dL  --  2.6*   TOTAL BILIRUBIN mg/dL  --  1.65*   ALK PHOS U/L  --  86   ALT U/L  --  9   AST U/L  --  13   GLUCOSE RANDOM mg/dL 164* 151*     Results from last 7 days   Lab Units 08/15/24  0450   INR  2.21*     Results from last 7 days   Lab Units 08/16/24  0450 08/16/24  0001 08/15/24  1701 08/15/24  1119 08/15/24  0756  08/14/24  2259 08/09/24  1103   POC GLUCOSE mg/dl 167* 194* 162* 165* 176* 173* 161*         Results from last 7 days   Lab Units 08/16/24  0529 08/14/24  2116 08/14/24  1836   LACTIC ACID mmol/L  --  2.1* 2.1*   PROCALCITONIN ng/ml 0.43*  --  0.52*       Lines/Drains:  Invasive Devices       Peripheral Intravenous Line  Duration             Peripheral IV 08/14/24 Right Antecubital 1 day              Drain  Duration             External Urinary Catheter 1 day                          Imaging: Reviewed radiology reports from this admission including: chest CT scan    Recent Cultures (last 7 days):   Results from last 7 days   Lab Units 08/14/24  1930   BLOOD CULTURE  No Growth at 24 hrs.  No Growth at 24 hrs.       Last 24 Hours Medication List:   Current Facility-Administered Medications   Medication Dose Route Frequency Provider Last Rate    acetaminophen  650 mg Oral Q6H PRN Philip Lopez, DO      allopurinol  100 mg Oral Daily Philip Lopez, DO      apixaban  5 mg Oral BID Philip Lopez, DO      cefTRIAXone  1,000 mg Intravenous Q24H Gabby Roberts MD 1,000 mg (08/15/24 1811)    dextrose  50 mL/hr Intravenous Continuous Silver Barron PA-C 50 mL/hr (08/16/24 0825)    escitalopram  20 mg Oral Daily Philip Lopez, DO      insulin lispro  1-5 Units Subcutaneous Q6H [facility] Silver Barron PA-C      metoprolol succinate  25 mg Oral Daily Philip Lopez, DO      pantoprazole  40 mg Oral BID Philip Lopez, DO      potassium chloride  20 mEq Intravenous Once Silver Barron PA-C      pravastatin  80 mg Oral Daily With Dinner Philip Lopez, DO      sodium bicarbonate  650 mg Oral BID after meals Philip Lopez, DO      ticagrelor  90 mg Oral Q12H JAVIER Philip Lopez, DO          Today, Patient Was Seen By: Silver Barron PA-C    **Please Note: This note may have been constructed using a voice recognition system.**

## 2024-08-16 NOTE — ASSESSMENT & PLAN NOTE
Wt Readings from Last 3 Encounters:   08/15/24 56.4 kg (124 lb 5.4 oz)   08/12/24 68 kg (150 lb)   08/09/24 68.1 kg (150 lb 2.1 oz)     Most recent echo in July 2024 with LVEF 30% which was down from echo in 2023  On PTA torsemide 10 mg daily  Although noted pulmonary venous congestion on CT imaging, patient with severe sepsis as above, received 500cc Isolyte bolus  Continue holding torsemide for now and giving IVF as below   Currently on room air

## 2024-08-16 NOTE — SPEECH THERAPY NOTE
Speech Language/Pathology    Speech/Language Pathology Progress Note    Patient Name: Anamaria Parnell  Today's Date: 8/16/2024     Problem List  Principal Problem:    Severe sepsis (HCC)  Active Problems:    H/o CVA    Type 2 diabetes mellitus with diabetic nephropathy (HCC)    Hypokalemia    Gastrointestinal stromal tumor (GIST) (HCC)    Aortoiliac occlusive disease (HCC)    CKD stage 4 secondary to hypertension (HCC)    Paroxysmal atrial fibrillation (HCC)    Chronic HFrEF, LVEF 30%, LVIDd 4.6 cm, AHA Stage C    Elevated troponin    Acute blood loss anemia    Acute metabolic encephalopathy    Hypernatremia       Past Medical History  Past Medical History:   Diagnosis Date    Aphasia as late effect of cerebrovascular accident (CVA) 08/16/2023    Arthritis     Chronic kidney disease     Diabetes mellitus (HCC)     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/20/2021    Endometriosis     High cholesterol     History of left common carotid artery stent placement 10/27/2023    Hypertension     Kidney disease, chronic, stage III (moderate, EGFR 30-59 ml/min) ([facility])     Lumbar disc herniation     Median arcuate ligament syndrome (HCC) 11/05/2019    Neuropathy     Obesity (BMI 30-39.9) 12/04/2017    Obesity, morbid (HCC) 01/20/2021    Peripheral vascular disease (HCC)     Pneumonia     Shoulder injury     left    Spinal stenosis     Stroke ([facility]) 2015    Memory loss        Past Surgical History  Past Surgical History:   Procedure Laterality Date    ARTERIOGRAM Bilateral 7/24/2024    Procedure: ARTERIOGRAM;  Surgeon: Jose Crum DO;  [facility]: [facility];  Service: Vascular    CARDIAC CATHETERIZATION      CAROTID STENT Left 9/7/2023    Procedure: L TCAR;  Surgeon: Nicole Gallo MD;  [facility];  Service: Vascular    COLONOSCOPY      FL RETROGRADE PYELOGRAM  4/6/2020    FRACTURE SURGERY Right     ankle    IR CAROTID STENT  9/7/2023    IR LOWER EXTREMITY ANGIOGRAM  7/24/2024     OVARIAN CYST SURGERY      DE CYSTO BLADDER W/URETERAL CATHETERIZATION Bilateral 4/6/2020    Procedure: CYSTOSCOPY WITH RETROGRADE PYELOGRAM;  Surgeon: Robert Mitchell MD;  [facility];  Service: Urology    DE CYSTO W/INSERT URETERAL STENT Right 4/6/2020    Procedure: INSERTION STENT URETERAL;  Surgeon: Robert Mitchell MD;  [facility];  Service: Urology    DE CYSTO W/REMOVAL OF TUMORS SMALL N/A 4/6/2020    Procedure: TRANSURETHRAL RESECTION OF BLADDER TUMOR (TURBT);  Surgeon: Robert Mitchell MD;  [facility];  Service: Urology    DE TEAEC W/WO PATCH GRAFT COMMON FEMORAL Bilateral 7/24/2024    Procedure: Bilateral femoral endarterectomies with patch angioplasty; Retrograde iliac intervention, shockwave, stent placement and angioplasty; femoral to femoral bypass with PTFE;  Surgeon: Jose Crum DO;  [facility];  Service: Vascular    ROTATOR CUFF REPAIR Left     TOE AMPUTATION Right 7/29/2024    Procedure: Rigth Hallux amputation, partial 1st ray resection;  Surgeon: Eddie Farooq DPM;  [facility]: [facility];  Service: Podiatry    TONSILLECTOMY           Subjective:  Patient is awake and alert.     Objective:  The patient continues to be NPO. She is agreeable to PO trials and is assessed with ice cream and thin liquids, with medications. SLP feeds patient. Oral opening and retrieval of bolus is adequate. Transfer time is min prolonged. Swallow also appears min delayed. The patient does not present with any overt s/s aspiration. Attempted medication x1 whole in puree, but patient is not able to transfer or clear pill. She takes medications crushed in puree better. Some oral residue observed. She tolerates all with no overt s/s aspiration.     Assessment:  Patient tolerated conservative trials well today.    Plan/Recommendations:  Recommend to begin puree diet with thin liquids. Crush medications. Continue ST to further assess tolerance and trial upgrades as able.

## 2024-08-16 NOTE — PROGRESS NOTES
[facility]  Progress Note  Name: Anamaria Parnell I  MRN: [id number]  Unit/Bed#: PPHP 726-01 I Date of Admission: 8/14/2024   Date of Service: 8/16/2024 I Hospital Day: 2    Assessment & Plan   Palliative care by specialist  Assessment & Plan  Palliative Diagnosis: severe sepsis, CVA, GIST, CKD4, HFrEF    Goals:   Ongoing minimally invasive medical management with limit of DNR/DNI at this time  Hospice consult placed and pending. Son's goal is to bring patient home.  Son is considering transition to level 4 comfort care pending patient's tolerance of minimally invasive medical optimization while awaiting hospice d/c.     Social Support:  Supportive listening provided  Normalized experience of patient  Provided anxiety containment  Advocated for patient/family with interdisciplinary team  Mediated conflict  Supported primarily by Austin singleton.    Care Coordination  Case discussed with areli ESQUIVEL at bedside    Follow-up  We appreciate the opportunity to participate in this patient's care.   We will continue to follow while admitted.  Will return on Monday 8/19 or sooner with questions or concerns.  Please do not hesitate to contact our on-call provider through [employer] Secure Chat or contact [phone] should there be an acute change or other symptom control concerns.      Gastrointestinal stromal tumor (GIST) (HCC)  Assessment & Plan  Defer  further work-up at this time given tentative plan to d/c home with hospice    * Severe sepsis (HCC)  Assessment & Plan  Continue medical management at this time  No escalation to ICU should patient worsen          Interval history:       Patient reportedly removed her IV this AM. Otherwise, does not offer verbal interaction during time of encounter. She appears restful when unstimulated. SonAustin, asked to speak outside of room. He asked appropriate questions about expectations of making transition to comfort focused care and hospice upon  discharge which were answered to his satisfaction. He intends to discuss further with his family and anticipates discussing with hospice liaison regarding logistics planning.     MEDICATIONS / ALLERGIES:     all current active meds have been reviewed    Allergies   Allergen Reactions    Fenofibrate Other (See Comments)      blood in urine  hx  Kidney Failure    Colesevelam Other (See Comments)      leg pains    Colestipol Itching and Other (See Comments)      Swelling lower legs    Ezetimibe GI Intolerance    Statins Myalgia     OBJECTIVE:    Physical Exam  Physical Exam  HENT:      Head: Normocephalic and atraumatic.      Mouth/Throat:      Comments: NGT in place  Eyes:      Conjunctiva/sclera: Conjunctivae normal.   Cardiovascular:      Rate and Rhythm: Normal rate.   Pulmonary:      Effort: No respiratory distress.   Abdominal:      General: There is no distension.      Tenderness: There is no guarding.   Skin:     General: Skin is warm.   Neurological:      Mental Status: She is alert.      Comments: Encephalopathic    Psychiatric:      Comments: calm       Lab Results:   Results from last 7 days   Lab Units 08/16/24  0529 08/15/24  0450 08/14/24  1836   WBC Thousand/uL 15.81* 16.48* 20.50*   HEMOGLOBIN g/dL 7.3* 7.1* 8.4*   HEMATOCRIT % 26.9* 25.1* 30.0*   PLATELETS Thousands/uL 219 221 242   SEGS PCT %  --  88* 88*   MONO PCT %  --  4 6   EOS PCT %  --  1 0     Results from last 7 days   Lab Units 08/16/24  0529 08/15/24  0450 08/14/24  1836   POTASSIUM mmol/L 3.3* 3.2* 3.6   CHLORIDE mmol/L 108 104 104   CO2 mmol/L 31 33* 34*   BUN mg/dL 48* 47* 51*   CREATININE mg/dL 1.79* 1.51* 1.59*   CALCIUM mg/dL 8.0* 7.5* 7.8*   ALK PHOS U/L  --  86 94   ALT U/L  --  9 10   AST U/L  --  13 16     Imaging Studies: reviewed pertinent studies   EKG, Pathology, and Other Studies: reviewed pertinent studies    Counseling / Coordination of Care    Total floor / unit time spent today 30 minutes. Greater than 50% of total time  was spent with the patient and / or family counseling and / or coordination of care. A description of the counseling / coordination of care: psychosocial support, chart review, lab review, goals of care, supportive listening, anticipatory guidance, and coordination with case management.

## 2024-08-16 NOTE — CONSULTS
Consultation - Nephrology   Anamaria Parnell 79 y.o. female MRN: [id number]  Unit/Bed#: [facility] 726-01 Encounter: [id number]    ASSESSMENT and PLAN:  Anamaria Parnell is a 79 y.o. female with a PMHx of CKD IV (Baseline Cr=1.5-1.8), HTN, HLD, T2DM, Gout, GERD, Renal Artery Stenosis, A-fib on Eliquis, HFrEF, Prior CVA in 2023 and PAD s/p bilateral femoral enterectomy with bypass and JACI/EIA stenting who was admitted to [facility] for severe sepsis in the setting of possible pneumonia with bilateral consolidations seen on CT chest. A renal consultation is requested today for assistance in the management of acute hypernatremia (En=[id number]). Patient on exam appears volume depleted and is likely exhibiting hypernatremia secondary to poor oral intake leading to free water depletion from dehydration. Patient's current mental status likely from current metabolic encephalopathy due to sepsis but could consider current hypernatremia as indicating worsening of mental status if not corrected. Patient at this time would benefit from free water repletion to manage hypernatremia.    SUMMARY OF RECOMMENDATIONS:  #Acute Hypernatremia  -Increased D5 IVF to 80 cc/hr to compensate for approximate 2 L of total water deficiency  -Morning BMP and Electrolytes  -Monitor mental status    #Chronic Kidney Disease Stage IV  -Baseline Cr=1.51-1.8  -Cr today borderline at 1.79  -Will monitor with BMP    #Hypokalemia  -K=3.3  -Likely deficient from poor PO intake  -Replete to target K>4    #Anemia  -Hgb=7.3  -Transfuse for Hgb<7      HISTORY OF PRESENT ILLNESS:  Requesting Physician: Robert Griggs MD  Reason for Consult: Hypernatremia    Anamaria Parnell is a 79 y.o. female with a PMHx of CKD IV (Baseline Cr=1.5-1.8), HTN, HLD, T2DM, Gout, GERD, Renal Artery Stenosis, A-fib on Eliquis, HFrEF, Prior CVA in 2023 and PAD s/p bilateral femoral enterectomy with bypass and JACI/EIA stenting who was admitted to [facility] for severe sepsis in the setting of possible  pneumonia with bilateral consolidations seen on CT chest. A renal consultation is requested today for assistance in the management of acute hypernatremia (Cv=[id number]).    Patient was seen at bedside today. History obtained from patient's son due to patient currently exhibiting AMS outside baseline. Patient's son reports that patient had reduced oral intake of food and water before presentation. Patient has been exhibiting AMS since previous admission that had progressively improved before this current hospitalization. Patient is otherwise denying any other symptoms at this time.    PAST MEDICAL HISTORY:  Past Medical History:   Diagnosis Date    Aphasia as late effect of cerebrovascular accident (CVA) 08/16/2023    Arthritis     Chronic kidney disease     Diabetes mellitus (HCC)     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/20/2021    Endometriosis     High cholesterol     History of left common carotid artery stent placement 10/27/2023    Hypertension     Kidney disease, chronic, stage III (moderate, EGFR 30-59 ml/min) (Formerly [facility])     Lumbar disc herniation     Median arcuate ligament syndrome (Formerly [facility]) 11/05/2019    Neuropathy     Obesity (BMI 30-39.9) 12/04/2017    Obesity, morbid (HCC) 01/20/2021    Peripheral vascular disease (Formerly [facility])     Pneumonia     Shoulder injury     left    Spinal stenosis     Stroke ([facility]ly [facility]) 2015    Memory loss       PAST SURGICAL HISTORY:  Past Surgical History:   Procedure Laterality Date    ARTERIOGRAM Bilateral 7/24/2024    Procedure: ARTERIOGRAM;  Surgeon: Jose Crum DO;  [facility]: [facility];  Service: Vascular    CARDIAC CATHETERIZATION      CAROTID STENT Left 9/7/2023    Procedure: L TCAR;  Surgeon: Nicole Gallo MD;  [facility]: [facility];  Service: Vascular    COLONOSCOPY      FL RETROGRADE PYELOGRAM  4/6/2020    FRACTURE SURGERY Right     ankle    IR CAROTID STENT  9/7/2023    IR LOWER EXTREMITY ANGIOGRAM  7/24/2024    OVARIAN CYST SURGERY      VA CYSTO  BLADDER W/URETERAL CATHETERIZATION Bilateral 2020    Procedure: CYSTOSCOPY WITH RETROGRADE PYELOGRAM;  Surgeon: Robert Mitchlel MD;  [facility]: [facility];  Service: Urology    AK CYSTO W/INSERT URETERAL STENT Right 2020    Procedure: INSERTION STENT URETERAL;  Surgeon: Robert Mitchell MD;  [facility];  Service: Urology    AK CYSTO W/REMOVAL OF TUMORS SMALL N/A 2020    Procedure: TRANSURETHRAL RESECTION OF BLADDER TUMOR (TURBT);  Surgeon: Robert Mitchell MD;  [facility];  Service: Urology    AK TEAEC W/WO PATCH GRAFT COMMON FEMORAL Bilateral 2024    Procedure: Bilateral femoral endarterectomies with patch angioplasty; Retrograde iliac intervention, shockwave, stent placement and angioplasty; femoral to femoral bypass with PTFE;  Surgeon: Jose Crum DO;  [facility];  Service: Vascular    ROTATOR CUFF REPAIR Left     TOE AMPUTATION Right 2024    Procedure: Rigth Hallux amputation, partial 1st ray resection;  Surgeon: Eddie Farooq DPM;  [facility];  Service: Podiatry    TONSILLECTOMY         SOCIAL HISTORY:  Social History     Substance and Sexual Activity   Alcohol Use Never     Social History     Substance and Sexual Activity   Drug Use Never     Social History     Tobacco Use   Smoking Status Former    Current packs/day: 0.00    Average packs/day: 2.0 packs/day for 54.6 years (109.1 ttl pk-yrs)    Types: Cigarettes    Start date: 1966    Quit date: 2020    Years since quittin.0   Smokeless Tobacco Never       FAMILY HISTORY:  Family History   Problem Relation Age of Onset    Hypertension Mother     Heart disease Mother         Valvular    Hyperlipidemia Mother     Hypertension Father     Heart defect Father         Cardiomegaly    Stroke Sister         Cerebrovascular Accident    Arthritis Brother     Other Brother         Back Disorder       ALLERGIES:  Allergies   Allergen Reactions    Fenofibrate Other (See Comments)       blood in urine  hx  Kidney Failure    Colesevelam Other (See Comments)      leg pains    Colestipol Itching and Other (See Comments)      Swelling lower legs    Ezetimibe GI Intolerance    Statins Myalgia       MEDICATIONS:    Current Facility-Administered Medications:     acetaminophen (TYLENOL) tablet 650 mg, 650 mg, Oral, Q6H PRN, Philip Lopez DO    allopurinol (ZYLOPRIM) tablet 100 mg, 100 mg, Oral, Daily, Philip Lopez DO    apixaban (ELIQUIS) tablet 5 mg, 5 mg, Oral, BID, Philip Lopez DO    cefTRIAXone (ROCEPHIN) 1,000 mg in dextrose 5 % 50 mL IVPB, 1,000 mg, Intravenous, Q24H, Gabby Roberts MD, Last Rate: 100 mL/hr at 08/15/24 1811, 1,000 mg at 08/15/24 1811    dextrose 5 % infusion, 50 mL/hr, Intravenous, Continuous, Silver Barron PA-C, Last Rate: 50 mL/hr at 08/16/24 0825, 50 mL/hr at 08/16/24 0825    escitalopram (LEXAPRO) tablet 20 mg, 20 mg, Oral, Daily, Philip Lopez DO    insulin lispro (HumALOG/ADMELOG) 100 units/mL subcutaneous injection 1-5 Units, 1-5 Units, Subcutaneous, Q6H JAVIER, 1 Units at 08/16/24 0511 **AND** Fingerstick Glucose (POCT), , , Q6H, Silver Barron PA-C    metoprolol succinate (TOPROL-XL) 24 hr tablet 25 mg, 25 mg, Oral, Daily, Philip Lopez DO    pantoprazole (PROTONIX) EC tablet 40 mg, 40 mg, Oral, BID, Philip Lopez DO    potassium chloride 20 mEq IVPB (premix), 20 mEq, Intravenous, Once, Silver Barron PA-C, Last Rate: 50 mL/hr at 08/16/24 0903, 20 mEq at 08/16/24 0903    pravastatin (PRAVACHOL) tablet 80 mg, 80 mg, Oral, Daily With Dinner, Philip Lopez DO    sodium bicarbonate tablet 650 mg, 650 mg, Oral, BID after meals, Philip Lopez DO    ticagrelor (BRILINTA) tablet 90 mg, 90 mg, Oral, Q12H JAVIER, Philip Lopez, DO    REVIEW OF SYSTEMS:  All the systems were reviewed and were negative except as documented on the HPI.    PHYSICAL EXAM:  Current Weight: Weight - Scale: 56.4 kg (124 lb 5.4 oz)  First Weight: Weight - Scale: 56.4 kg (124 lb 5.4 oz)  Vitals:    08/15/24  1449 08/15/24 2120 08/16/24 0730 08/16/24 0846   BP: 118/60 136/70 112/70    BP Location:       Pulse: 97 99  103   Resp: 16      Temp: 98.3 °F (36.8 °C) 98.7 °F (37.1 °C) 98.2 °F (36.8 °C)    TempSrc:       SpO2: 96% 100%  97%   Weight:       Height:           Intake/Output Summary (Last 24 hours) at 8/16/2024 0936  Last data filed at 8/16/2024 0800  Gross per 24 hour   Intake 50 ml   Output 350 ml   Net -300 ml        Physical Exam  Vitals and nursing note reviewed.   Constitutional:       Appearance: Normal appearance. She is normal weight.      Comments: Patient is Aox1 to self   HENT:      Head: Normocephalic and atraumatic.      Mouth/Throat:      Mouth: Mucous membranes are dry.      Pharynx: Oropharynx is clear. No oropharyngeal exudate or posterior oropharyngeal erythema.   Cardiovascular:      Rate and Rhythm: Regular rhythm. Tachycardia present.      Pulses: Normal pulses.      Heart sounds: Normal heart sounds. No murmur heard.  Pulmonary:      Effort: Pulmonary effort is normal. No respiratory distress.      Breath sounds: Normal breath sounds. No wheezing, rhonchi or rales.   Abdominal:      General: Abdomen is flat. There is no distension.      Palpations: Abdomen is soft.      Tenderness: There is no abdominal tenderness.   Musculoskeletal:      Right lower leg: Edema present.      Left lower leg: Edema present.      Comments: +1 pitting edema to the ankle bilaterally   Skin:     General: Skin is warm and dry.      Comments: Poor skin turgor   Neurological:      Mental Status: She is alert.           Lab Results:   Results from last 7 days   Lab Units 08/16/24  0529 08/15/24  0450 08/14/24  1836   WBC Thousand/uL 15.81* 16.48* 20.50*   HEMOGLOBIN g/dL 7.3* 7.1* 8.4*   HEMATOCRIT % 26.9* 25.1* 30.0*   PLATELETS Thousands/uL 219 221 242   SODIUM mmol/L 153* 148* 147   POTASSIUM mmol/L 3.3* 3.2* 3.6   CHLORIDE mmol/L 108 104 104   CO2 mmol/L 31 33* 34*   BUN mg/dL 48* 47* 51*   CREATININE mg/dL 1.79*  "1.51* 1.59*   CALCIUM mg/dL 8.0* 7.5* 7.8*   MAGNESIUM mg/dL 2.1 2.0  --    ALK PHOS U/L  --  86 94   ALT U/L  --  9 10   AST U/L  --  13 16       Other Studies: N/A    Alban Baig, PGY-1  Internal Medicine Residency  [facility]    Portions of the record may have been created with voice recognition software. Occasional wrong word or \"sound a like\" substitutions may have occurred due to the inherent limitations of voice recognition software. Read the chart carefully and recognize, using context, where substitutions have occurred.If you have any questions, please contact the dictating provider.   "

## 2024-08-16 NOTE — HOSPICE NOTE
Received hospice referral, I met with pt's son Austin. Hospice benefits reviewed per Medicare guidelines and all questions answered. GOC aligned with hospice. Dr Davey approved for home hospice LOC. Austin needs a few days to get things ready, he would also like her to continue to receive antibiotics for a few more days.  Discussed equipment need at home, equipment ordered for delivery Tuesday. Plan is for transport home Wednesday by noon, hospice nurse will come Wednesday afternoon. ALVIN Ch updated. Hospice will continue to follow.

## 2024-08-16 NOTE — ASSESSMENT & PLAN NOTE
"Sodium 153 this AM, in the setting of poor PO intake/NPO  Check imaging on admission did reveal \"Small right effusion, smaller left effusion, suggest volume overload and pulmonary congestion\"  Patient currently satting well on room air  Gentle IVF with D5W  Nephrology consulted  "

## 2024-08-16 NOTE — CASE MANAGEMENT
Case Management Discharge Planning Note    Patient name Anamaria Parnell  Location [facility] 726/[facility] 726-01 MRN [id number]  : 1945 Date 2024       Current Admission Date: 2024  Current Admission Diagnosis:Severe sepsis (HCC)   Patient Active Problem List    Diagnosis Date Noted Date Diagnosed    Hypernatremia 2024     Palliative care by specialist 2024     Chronic kidney disease 2024     Severe sepsis (HCC) 08/15/2024     Acute metabolic encephalopathy 08/15/2024     Bleeding from wound 08/15/2024     Patient refused evaluation 2024     Melena 2024     Acute blood loss anemia 2024     Encephalopathy 2024     Elevated troponin 2024     Acute liver failure without hepatic coma 2024     Transaminitis 2024     Pulmonary hypertension (HCC) 2024     Chronic HFrEF, LVEF 30%, LVIDd 4.6 cm, AHA Stage C 2024     Paroxysmal atrial fibrillation (HCC) 2024     Coronary artery disease of native artery of native heart with stable angina pectoris (HCC) 2024     Cellulitis of toe of right foot 2024     Sepsis (HCC) 2024     Diabetic ulcer of toe of right foot associated with diabetes mellitus due to underlying condition, limited to breakdown of skin (HCC) 2024     Carotid stenosis, asymptomatic, right 10/27/2023     Complex renal cyst 10/18/2023     Encounter for follow-up surveillance of urothelial carcinoma of lower urinary tract 10/18/2023     Encounter to discuss test results 10/17/2023     Smoking 2023     Renal cyst 2023     Primary hypertension 2023     Dysarthria 08/15/2023     CKD stage 4 secondary to hypertension (HCC)      Aortoiliac occlusive disease (HCC) 10/11/2022     History of gastrointestinal stromal tumor (GIST) 2022     Secondary hyperparathyroidism of renal origin (HCC) 2022     Gastrointestinal stromal tumor (GIST) (HCC) 2022     Type 2 diabetes mellitus,  without long-term current use of insulin ([facility]) 04/27/2021     Type 2 diabetes mellitus with diabetic peripheral angiopathy without gangrene (HCC) 04/27/2021     Depression, recurrent ([facility]) 01/20/2021     Acute renal failure (ARF) ([facility]) 11/03/2020     Hypertensive kidney disease with stage 4 chronic kidney disease ([facility]) 11/03/2020     Cervical radiculopathy 05/12/2020     Malignant neoplasm of overlapping sites of bladder ([facility]) 04/24/2020     Tachy-jeff syndrome ([facility]) 04/15/2020     Stenosis of left anterior descending artery Mid LAD 50-60% stenosis 04/01/2020     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 04/01/2020     Pulmonary nodule 7 mm groundglass opacity in the right upper lobe, unchanged since October 2019 03/19/2020     Atherosclerosis of native arteries of right leg with ulceration of other part of foot ([facility]) 03/03/2020     Symptomatic carotid artery stenosis, left 03/03/2020     Femoral artery stenosis, right (HCC) 50-75% stenosis in the common femoral artery. 01/14/2020     Mesenteric artery stenosis ([facility]) 01/14/2020     Positive cardiac stress test  small, mildly severe, partially reversible myocardial perfusion defect of anterior and inferior wall  11/12/2019     Abnormal CT of the chest suspicious for an infectious or inflammatory bronchiolitis. 11/07/2019     Celiac artery stenosis ([facility]) >70% stenosis in the celiac trunk 11/05/2019     Hypokalemia 03/07/2019     Aortoiliac stenosis, left ([facility]) 02/25/2019     Spondylosis of lumbar region without myelopathy or radiculopathy 01/29/2019     Lumbosacral spondylosis without myelopathy 01/29/2019     Statin intolerance 01/22/2019     Lumbar disc herniation 01/22/2019     Herniated lumbar intervertebral disc 01/22/2019     Chronic GERD 12/04/2017     Acute renal failure superimposed on stage 3 chronic kidney disease (HCC) 12/04/2017     Claudication in peripheral vascular disease ([facility]) 12/04/2017     Gout  12/04/2017     Hx-TIA (transient ischemic attack) 12/04/2017     Hyperlipidemia associated with type 2 diabetes mellitus  (HCC) 12/04/2017     Hypertension associated with diabetes  (HCC) 12/04/2017     Osteoarthritis 12/04/2017     Right renal artery stenosis (HCC) 12/04/2017     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     Hyperlipidemia, unspecified 12/04/2017     Vitamin D deficiency 09/08/2016     Basilar artery stenosis 06/22/2016     Type 2 diabetes mellitus with diabetic nephropathy (HCC) 12/19/2015     Hypercholesteremia 12/19/2015     Hypertension 12/19/2015     H/o CVA 11/09/2015       LOS (days): 2  Geometric Mean LOS (GMLOS) (days): 5.1  Days to GMLOS:3.4     OBJECTIVE:  Risk of Unplanned Readmission Score: 50.52         Current admission status: Inpatient   Preferred Pharmacy:   PATIENT/FAMILY REPORTS NO PREFERRED PHARMACY  No address on file      Primary Care Provider: Ritchie Lawton MD    Primary Insurance: [facility]  Secondary Insurance:     DISCHARGE DETAILS:      Additional Comments: Patient will return home with hospice. Family needs to ready the home, DME will be delivered on Tuesday 8/21.

## 2024-08-16 NOTE — PHYSICAL THERAPY NOTE
PHYSICAL THERAPY CANCEL NOTE          Patient Name: Anamaria Parnell  Today's Date: 8/16/2024 08/16/24 0801   PT Last Visit   PT Visit Date 08/16/24   Note Type   Note type Cancelled Session;Evaluation   Cancel Reasons Other   Additional Comments Per RN, pt agitated, taking out IV and requiring restraints. also pending hospice consult. will hold PT for today. Thank you.     Jim Herr, PT, DPT, NCS

## 2024-08-16 NOTE — OCCUPATIONAL THERAPY NOTE
Occupational Therapy Cancel Note        Patient Name: Anamaria Parnell  Today's Date: 8/16/2024 08/16/24 1101   OT Last Visit   OT Visit Date 08/16/24   Note Type   Note type Cancelled Session   Cancel Reasons Other   OT consulted, chart reviewed. Per chart, family considering hospice/GOC. Will continue to follow and attempt evaluation as appropriate. Elenita Mills OTR/L

## 2024-08-16 NOTE — ASSESSMENT & PLAN NOTE
Lab Results   Component Value Date    HGBA1C 5.9 (H) 07/09/2024       Recent Labs     08/15/24  1119 08/15/24  1701 08/16/24  0001 08/16/24  0450   POCGLU 165* 162* 194* 167*         Blood Sugar Average: Last 72 hrs:  (P) [id number]    Controlled based on recent A1c  Hold scheduled Lantus for now  Diet per SLP recs - currently NPO  Glucose checks and SSI coverage Q6hr for now as patient is currently NPO. If SLP clears for diet, would change to TID AC and QHS SSI

## 2024-08-16 NOTE — ASSESSMENT & PLAN NOTE
During prior hospitalization noted to have melena as well as anemia requiring transfusion  Hemoglobin 7.3 today, down from 8.4 on admission. This in the setting of IVF   Appears baseline in the 8-9 range

## 2024-08-16 NOTE — ASSESSMENT & PLAN NOTE
POA aeb tachycardia, leukocytosis, and elevated lactic acid level  Imaging concern for possible pneumonia  Procal elevated on admission. Improving  Leukocytosis improving.  Currently on IV ceftriaxone per ID recs  Blood cultures with no growth x2 after 24 hours  ID consult appreciated  Palliative care consult appreciated as well. Hospice consult pending   NPO per SLP recs

## 2024-08-16 NOTE — ASSESSMENT & PLAN NOTE
Notified by nursing that patient's right groin wound had a large clot present during wound vac dressing change  Nursing concerned about replacing suction with clot present  Discussed with vascular- Ok to replace wound vac   Monitor for bleeding  Later in day nursing noted a saturated bed linen and bleeding from right groin wound  Recommended sending to [facility] ED at request of Vascular team

## 2024-08-17 PROBLEM — Z71.89 GOALS OF CARE, COUNSELING/DISCUSSION: Status: ACTIVE | Noted: 2020-02-05

## 2024-08-17 NOTE — ASSESSMENT & PLAN NOTE
CT imaging with evidence of GIST  Reviewed last prolonged hospitalization in which patient was noted to have melena and it does appear that this GIST was noted on CTA imaging on 7/18; does also appear from notes that the GI team who was seeing patient during last hospitalization.   Patient and son made aware, and now patient is opted into hospice care , with plan for home hospice on Wednesday. Therefore no plan for further GIST workup or treatment given comfort care only.

## 2024-08-17 NOTE — ASSESSMENT & PLAN NOTE
Continue PTA metoprolol as well as Eliquis  Patient's son prefers and asked to continue medical management including AC for now while inpatient till home hospice transportation. See above.

## 2024-08-17 NOTE — ASSESSMENT & PLAN NOTE
"POA aeb tachycardia, leukocytosis, and elevated lactic acid level  Imaging concern for possible pneumonia  Procal elevated on admission. Improving  Leukocytosis improving.  Currently on IV ceftriaxone per ID recs  Blood cultures with no growth x2 after 24 hours  ID consult appreciated - recs for \"stop Ceftriaxone and monitor off antibiotics\"   Patient and family opted into hospice, accepted for home hospice, planned transportation to home 8/21 - hospice team and CM following.   "

## 2024-08-17 NOTE — PLAN OF CARE
Problem: DISCHARGE PLANNING  Goal: Discharge to home or other facility with appropriate resources  Description: INTERVENTIONS:  - Identify barriers to discharge w/patient and caregiver  - Arrange for needed discharge resources and transportation as appropriate  - Identify discharge learning needs (meds, wound care, etc.)  - Arrange for interpretive services to assist at discharge as needed  - Refer to Case Management Department for coordinating discharge planning if the patient needs post-hospital services based on physician/advanced practitioner order or complex needs related to functional status, cognitive ability, or social support system  Outcome: Progressing     Problem: Knowledge Deficit  Goal: Patient/family/caregiver demonstrates understanding of disease process, treatment plan, medications, and discharge instructions  Description: Complete learning assessment and assess knowledge base.  Interventions:  - Provide teaching at level of understanding  - Provide teaching via preferred learning methods  Outcome: Progressing     Problem: NEUROSENSORY - ADULT  Goal: Achieves stable or improved neurological status  Description: INTERVENTIONS  - Monitor and report changes in neurological status  - Monitor vital signs such as temperature, blood pressure, glucose, and any other labs ordered   - Initiate measures to prevent increased intracranial pressure  - Monitor for seizure activity and implement precautions if appropriate      Outcome: Progressing  Goal: Achieves maximal functionality and self care  Description: INTERVENTIONS  - Monitor swallowing and airway patency with patient fatigue and changes in neurological status  - Encourage and assist patient to increase activity and self care.   - Encourage visually impaired, hearing impaired and aphasic patients to use assistive/communication devices  Outcome: Progressing

## 2024-08-17 NOTE — PHYSICAL THERAPY NOTE
PHYSICAL THERAPY NOTE          Patient Name: Anamaria Parnell  Today's Date: 8/17/2024 08/17/24 0755   PT Last Visit   PT Visit Date 08/17/24   Note Type   Note type Screen       PT orders received and chart reviewed. Plan for patient to d/c home with hospice services. No PT need identified at this time. PT will sign off.    Joan Quan, PT, DPT

## 2024-08-17 NOTE — ASSESSMENT & PLAN NOTE
"Sodium 153 this AM, in the setting of poor PO intake/NPO  Check imaging on admission did reveal \"Small right effusion, smaller left effusion, suggest volume overload and pulmonary congestion\"  Patient currently satting well on room air  Gentle IVF with D5W  Nephrology consulted - 8/17 sodium relatively improved and Dextrose 5% decreased to 50 ml/hr  "

## 2024-08-17 NOTE — ASSESSMENT & PLAN NOTE
Lab Results   Component Value Date    HGBA1C 5.9 (H) 07/09/2024       Recent Labs     08/16/24  2224 08/17/24  0752 08/17/24  1157 08/17/24  1704   POCGLU 236* 253* 243* 258*       Blood Sugar Average: Last 72 hrs:  (P) 207.5    Controlled based on recent A1c  Hold scheduled Lantus for now  Diet per SLP recs - currently on dysphagia pureed thin liquid diet.   Given planned hospice , no benefit of tight control or low carb diet. Check daily ~ 200's

## 2024-08-17 NOTE — CASE MANAGEMENT
Case Management Assessment & Discharge Planning Note    Patient name Anamaria Parnell  Location [facility] 726/[facility] 726-01 MRN [id number]  : 1945 Date 2024       Current Admission Date: 2024  Current Admission Diagnosis:Severe sepsis (HCC)   Patient Active Problem List    Diagnosis Date Noted Date Diagnosed    Hypernatremia 2024     Palliative care by specialist 2024     Chronic kidney disease 2024     Severe sepsis (HCC) 08/15/2024     Acute metabolic encephalopathy 08/15/2024     Bleeding from wound 08/15/2024     Patient refused evaluation 2024     Melena 2024     Acute blood loss anemia 2024     Encephalopathy 2024     Elevated troponin 2024     Acute liver failure without hepatic coma 2024     Transaminitis 2024     Pulmonary hypertension (HCC) 2024     Chronic HFrEF, LVEF 30%, LVIDd 4.6 cm, AHA Stage C 2024     Paroxysmal atrial fibrillation (HCC) 2024     Coronary artery disease of native artery of native heart with stable angina pectoris (HCC) 2024     Cellulitis of toe of right foot 2024     Sepsis (HCC) 2024     Diabetic ulcer of toe of right foot associated with diabetes mellitus due to underlying condition, limited to breakdown of skin (HCC) 2024     Carotid stenosis, asymptomatic, right 10/27/2023     Complex renal cyst 10/18/2023     Encounter for follow-up surveillance of urothelial carcinoma of lower urinary tract 10/18/2023     Encounter to discuss test results 10/17/2023     Smoking 2023     Renal cyst 2023     Primary hypertension 2023     Dysarthria 08/15/2023     CKD stage 4 secondary to hypertension (HCC)      Aortoiliac occlusive disease (HCC) 10/11/2022     History of gastrointestinal stromal tumor (GIST) 2022     Secondary hyperparathyroidism of renal origin (HCC) 2022     Gastrointestinal stromal tumor (GIST) (HCC) 2022     Type 2 diabetes  mellitus, without long-term current use of insulin ([facility]) 04/27/2021     Type 2 diabetes mellitus with diabetic peripheral angiopathy without gangrene ([facility]) 04/27/2021     Depression, recurrent ([facility]) 01/20/2021     Acute renal failure (ARF) ([facility]) 11/03/2020     Hypertensive kidney disease with stage 4 chronic kidney disease ([facility]) 11/03/2020     Cervical radiculopathy 05/12/2020     Malignant neoplasm of overlapping sites of bladder ([facility]) 04/24/2020     Tachy-jeff syndrome ([facility]) 04/15/2020     Stenosis of left anterior descending artery Mid LAD 50-60% stenosis 04/01/2020     Right coronary artery occlusion ([facility])total occlusion of proximal RCA with collateral circ 04/01/2020     Pulmonary nodule 7 mm groundglass opacity in the right upper lobe, unchanged since October 2019 03/19/2020     Atherosclerosis of native arteries of right leg with ulceration of other part of foot ([facility]) 03/03/2020     Symptomatic carotid artery stenosis, left 03/03/2020     Femoral artery stenosis, right ([facility]) 50-75% stenosis in the common femoral artery. 01/14/2020     Mesenteric artery stenosis ([facility]) 01/14/2020     Positive cardiac stress test  small, mildly severe, partially reversible myocardial perfusion defect of anterior and inferior wall  11/12/2019     Abnormal CT of the chest suspicious for an infectious or inflammatory bronchiolitis. 11/07/2019     Celiac artery stenosis ([facility]) >70% stenosis in the celiac trunk 11/05/2019     Hypokalemia 03/07/2019     Aortoiliac stenosis, left ([facility]) 02/25/2019     Spondylosis of lumbar region without myelopathy or radiculopathy 01/29/2019     Lumbosacral spondylosis without myelopathy 01/29/2019     Statin intolerance 01/22/2019     Lumbar disc herniation 01/22/2019     Herniated lumbar intervertebral disc 01/22/2019     Chronic GERD 12/04/2017     Acute renal failure superimposed on stage 3 chronic kidney disease (HCC) 12/04/2017     Claudication in peripheral vascular disease ([facility]) 12/04/2017      Gout 12/04/2017     Hx-TIA (transient ischemic attack) 12/04/2017     Hyperlipidemia associated with type 2 diabetes mellitus  (HCC) 12/04/2017     Hypertension associated with diabetes  (HCC) 12/04/2017     Osteoarthritis 12/04/2017     Right renal artery stenosis (HCC) 12/04/2017     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     Hyperlipidemia, unspecified 12/04/2017     Vitamin D deficiency 09/08/2016     Basilar artery stenosis 06/22/2016     Type 2 diabetes mellitus with diabetic nephropathy (HCC) 12/19/2015     Hypercholesteremia 12/19/2015     Hypertension 12/19/2015     H/o CVA 11/09/2015       LOS (days): 3  Geometric Mean LOS (GMLOS) (days): 5.1  Days to GMLOS:2.5     OBJECTIVE:  PATIENT READMITTED TO HOSPITAL  Risk of Unplanned Readmission Score: 50.57         Current admission status: Inpatient       Preferred Pharmacy:   PATIENT/FAMILY REPORTS NO PREFERRED PHARMACY  No address on file      Primary Care Provider: Ritchie Lawton MD    Primary Insurance: [employer] RE[employer]  Secondary Insurance:     ASSESSMENT:  Active Health Care Proxies       [facility] Representative - Son   Primary Phone: [phone] (Mobile)                 Readmission Root Cause  30 Day Readmission: Yes  Who directed you to return to the hospital?: Family  Did you understand whom to contact if you had questions or problems?: Yes  Did you get your prescriptions before you left the hospital?: Yes  Were you able to get your prescriptions filled when you left the hospital?: Yes  Did you take your medications as prescribed?: Yes  Were you able to get to your follow-up appointments?: Yes  During previous admission, was a post-acute recommendation made?: Yes  What post-acute resources were offered?: STR  Patient was readmitted due to: sepsis, leg pain  Action Plan: likely home with hospice with son    Patient Information  Admitted from:: Facility (was at [place] for STR)  Mental Status: Confused  During  Assessment patient was accompanied by: Son  Assessment information provided by:: Son  Support Systems: Self, Son, Friends/neighbors  County of Residence: [place]  What [facility] do you live in?: [place]  Home entry access options. Select all that apply.: Ramp  Type of Current Residence: [facility]  Living Arrangements: Lives w/ Son  Is patient a ?: No    Activities of Daily Living Prior to Admission  Functional Status: Total dependent  Completes ADLs independently?: No  Level of ADL dependence: Total Dependent  Ambulates independently?: No  Level of ambulatory dependence: Total Dependent  Does patient use assisted devices?: Yes  Assisted Devices (DME) used: Walker, Wheelchair, Bedside Commode, Straight Cane, Shower Chair  Does patient currently own DME?: No  Does patient have a history of Outpatient Therapy (PT/OT)?: No  Does the patient have a history of Short-Term Rehab?: Yes ([facility])  Does patient have a history of HHC?: Yes ([facility])  Does patient currently have HHC?: No    Patient Information Continued  Income Source: Pension/alf  Does patient have prescription coverage?: Yes  Does patient receive dialysis treatments?: No  Does patient have a history of substance abuse?: No  Does patient have a history of Mental Health Diagnosis?: No    Means of Transportation  Means of Transport to Appts:: Family transport      Social Determinants of Health (SDOH)      Flowsheet Row Most Recent Value   Housing Stability    In the last 12 months, was there a time when you were not able to pay the mortgage or rent on time? N   In the past 12 months, how many times have you moved where you were living? 0   At any time in the past 12 months, were you homeless or living in a shelter (including now)? N   Transportation Needs    In the past 12 months, has lack of transportation kept you from medical appointments or from getting medications? no   In the past 12 months, has lack of transportation kept you from  meetings, work, or from getting things needed for daily living? No   Food Insecurity    Within the past 12 months, you worried that your food would run out before you got the money to buy more. Never true   Within the past 12 months, the food you bought just didn't last and you didn't have money to get more. Never true   Utilities    In the past 12 months has the electric, gas, oil, or water company threatened to shut off services in your home? No            DISCHARGE DETAILS:    Discharge planning discussed with:: Austin Ponce  Freedom of Choice: Yes  Comments - Freedom of Choice: Discussed FOC  CM contacted family/caregiver?: Yes    CM reviewed d/c planning process including the following: identifying help at home, patient preference for d/c planning needs, Discharge Lounge, [facility] Meds to Bed program, availability of treatment team to discuss questions or concerns patient and/or family may have regarding understanding medications and recognizing signs and symptoms once discharged.  CM also encouraged patient to follow up with all recommended appointments after discharge. Patient advised of importance for patient and family to participate in managing patient’s medical well being.    This CM introduced self and role to patient's sonAustin, who is at bedside.  Patient resides with son in ranch style home, ramp access.  Has commode, walker, WC and SC at home.  Will need hospital bed and home oxygen ordered through  hospice

## 2024-08-17 NOTE — PROGRESS NOTES
"Vascular Surgery  Progress Note   Anamaria Parnell 79 y.o. female MRN: [id number]  Unit/Bed#: [facility] 726-01 Encounter: [id number]    Assessment:  Ms. Parnell is a 78yo female with multiple comorbidities, AOID/PAD with R hallux gangrene s/p 1st stage b/l CFAE, left JACI/EIA POBA/shockwave lithotripsy with stenting and left to right fem-fem bypass, b/l groin vac 24 at [facility], R hallux amp 24. Post-op course c/b persistent leukocytosis, AMS, Afib, tachy-jeff syndrome, decreased EF, encephalopathy with lactic acidosis, shock liver. The patient and family ultimately decided against any further surgical interventions and is now transitioning to hospice.       Plan:  -Continue daily dressing changes of the b/l groin wounds; nursing orders placed  -Pt would not tolerate further debridement at bedside  -Vascular surgery to sign off at this time; please reach out to the Resident/AP role with further questions    Subjective/Objective     Subjective: Pt is encephalopathic and does not answer questions appropriately.    Objective:     Vitals:Blood pressure 148/80, pulse 83, temperature 98 °F (36.7 °C), resp. rate 18, height 4' 11\" (1.499 m), weight 56.4 kg (124 lb 5.4 oz), SpO2 96%.,Body mass index is 25.11 kg/m².     Temp (24hrs), Av.1 °F (36.7 °C), Min:97.4 °F (36.3 °C), Max:98.9 °F (37.2 °C)  Current: Temperature: 98 °F (36.7 °C)      Intake/Output Summary (Last 24 hours) at 2024 0806  Last data filed at 2024 0744  Gross per 24 hour   Intake 886.33 ml   Output --   Net 886.33 ml       Invasive Devices       Peripheral Intravenous Line  Duration             Peripheral IV 24 Right Antecubital 2 days              Drain  Duration             External Urinary Catheter 2 days                    Physical Exam:  General: No acute distress, alert and oriented  CV: Well perfused, regular rate and rhythm  Lungs: Normal work of breathing, no increased respiratory effort  Abdomen: Soft, non-tender, " non-distended.  Groin b/l groin wound dressings changed at bedside; no evidence of infection although tissue quality is poor with moderate fibrinous exudate which was mechanically debrided with gauze.  Extremities: Right foot wound cleanly dressed  Skin: Warm, dry    Lab Results: Results: I have personally reviewed all pertinent laboratory/tests results  VTE Prophylaxis: Sequential compression device (Venodyne)     Harpal Kat MD  8/17/2024

## 2024-08-17 NOTE — ASSESSMENT & PLAN NOTE
Plan for home hospice , transportation on Wednesday 8/21  Kris Avila prefer to keep lever 3 DNR/DNI for now while in the hospital including medical management as it is but no aggressive/surgical interventions   PRN Pain meds in place   Hospice and CM on board and arranging

## 2024-08-17 NOTE — OCCUPATIONAL THERAPY NOTE
Occupational Therapy screen        Patient Name: Anamaria Parnell  Today's Date: 8/17/2024 08/17/24 0753   OT Last Visit   OT Visit Date 08/17/24   Note Type   Note type Screen   Additional Comments pt to dc home w home hospice. no acute OT indicated at this time. please reconsult if change in status or plan of care occurs.         Angie Valencia, TRENT, OTR/L

## 2024-08-17 NOTE — PROGRESS NOTES
"[facility]  Progress Note  Name: Anamaria Parnell I  MRN: [id number]  Unit/Bed#: [facility] 726-01 I Date of Admission: 8/14/2024   Date of Service: 8/17/2024 I Hospital Day: 3    Assessment & Plan   * Goals of care, counseling/discussion  Assessment & Plan  Plan for home hospice , transportation on Wednesday 8/21  Son Austin prefer to keep lever 3 DNR/DNI for now while in the hospital including medical management as it is but no aggressive/surgical interventions   PRN Pain meds in place   Hospice and CM on board and arranging      Palliative care by specialist  Assessment & Plan  See GOC A&P     Hypernatremia  Assessment & Plan  Sodium 153 this AM, in the setting of poor PO intake/NPO  Check imaging on admission did reveal \"Small right effusion, smaller left effusion, suggest volume overload and pulmonary congestion\"  Patient currently satting well on room air  Gentle IVF with D5W  Nephrology consulted - 8/17 sodium relatively improved and Dextrose 5% decreased to 50 ml/hr    Acute metabolic encephalopathy  Assessment & Plan  In the setting of suspected severe sepsis as above  CT head on admission revealed, \"No acute intracranial abnormality. Chronic left PCA territory infarct, stable.\"  Patient has reported baseline memory issues and son reported to admitting provider that she has been increasingly somnolent since prior hospitalization  Delirium precautions    Severe sepsis (HCC)  Assessment & Plan  POA aeb tachycardia, leukocytosis, and elevated lactic acid level  Imaging concern for possible pneumonia  Procal elevated on admission. Improving  Leukocytosis improving.  Currently on IV ceftriaxone per ID recs  Blood cultures with no growth x2 after 24 hours  ID consult appreciated - recs for \"stop Ceftriaxone and monitor off antibiotics\"   Patient and family opted into hospice, accepted for home hospice, planned transportation to home 8/21 - hospice team and CM following.     Acute " blood loss anemia  Assessment & Plan  During prior hospitalization noted to have melena as well as anemia requiring transfusion  Hemoglobin 7.3 today, down from 8.4 on admission. This in the setting of IVF   Appears baseline in the 8-9 range    Elevated troponin  Assessment & Plan  Suspect non-MI troponin elevation in the setting of severe sepsis    Paroxysmal atrial fibrillation (HCC)  Assessment & Plan  Continue PTA metoprolol as well as Eliquis  Patient's son prefers and asked to continue medical management including AC for now while inpatient till home hospice transportation. See above.     Aortoiliac occlusive disease (HCC)  Assessment & Plan  Recent hospitalization for bilateral femoral endarterectomy with bypass and JACI/EIA stenting as well as right first toe amputation  Was evaluated by Vascular Surgery on admission who did not feel that wound infection was present  Continue Brilinta as well as statin    Gastrointestinal stromal tumor (GIST) (HCC)  Assessment & Plan  CT imaging with evidence of GIST  Reviewed last prolonged hospitalization in which patient was noted to have melena and it does appear that this GIST was noted on CTA imaging on 7/18; does also appear from notes that the GI team who was seeing patient during last hospitalization.   Patient and son made aware, and now patient is opted into hospice care , with plan for home hospice on Wednesday. Therefore no plan for further GIST workup or treatment given comfort care only.     Hypokalemia  Assessment & Plan  Replete and monitor  Mag WNL    Type 2 diabetes mellitus with diabetic nephropathy (HCC)  Assessment & Plan  Lab Results   Component Value Date    HGBA1C 5.9 (H) 07/09/2024       Recent Labs     08/16/24  2224 08/17/24  0752 08/17/24  1157 08/17/24  1704   POCGLU 236* 253* 243* 258*       Blood Sugar Average: Last 72 hrs:  (P) 207.5    Controlled based on recent A1c  Hold scheduled Lantus for now  Diet per SLP recs - currently on dysphagia  "pureed thin liquid diet.   Given planned hospice , no benefit of tight control or low carb diet. Check daily ~ 200's     H/o CVA  Assessment & Plan  CT head as above  On eliquis, Brilinta, and statin               VTE Pharmacologic Prophylaxis: VTE Score: 5 Moderate Risk (Score 3-4) - Pharmacological DVT Prophylaxis Ordered: apixaban (Eliquis).    Mobility:   Basic Mobility Inpatient Raw Score: 6  JH-HLM Goal: 2: Bed activities/Dependent transfer  JH-HLM Achieved: 2: Bed activities/Dependent transfer  JH-HLM Goal achieved. Continue to encourage appropriate mobility.    Patient Centered Rounds: I performed bedside rounds with nursing staff today.   Discussions with Specialists or Other Care Team Provider: hospice input noted ;discussed with nephrology provider     Education and Discussions with Family / Patient: Updated  (son) at bedside.    Total Time Spent on Date of Encounter in care of patient: 50 mins. This time was spent on one or more of the following: performing physical exam; counseling and coordination of care; obtaining or reviewing history; documenting in the medical record; reviewing/ordering tests, medications or procedures; communicating with other healthcare professionals and discussing with patient's family/caregivers.    Current Length of Stay: 3 day(s)  Current Patient Status: Inpatient   Certification Statement: The patient will continue to require additional inpatient hospital stay due to pending placement to home hospice on Wednesday 8/21  Discharge Plan:  home hospice 8/21    Code Status: Level 3 - DNAR and DNI    Subjective:   Seen and examined , son at bedside   Patient had back pain earlier for which got Dilaudid 0.2 mg which helped with the pain, will add lidocaine patch  Otherwise patinent denies any new symptoms today and feeling \"okay\"   Confirmed DNR/DNI for now not level 4 while in the hospital ,as patient is concerned and want \"to make sure she stay stable as possible " "till she goes home on Wednesday\"         Objective:     Vitals:   Temp (24hrs), Av.7 °F (36.5 °C), Min:96.3 °F (35.7 °C), Max:98.9 °F (37.2 °C)    Temp:  [96.3 °F (35.7 °C)-98.9 °F (37.2 °C)] 96.3 °F (35.7 °C)  HR:  [79-87] 79  Resp:  [15] 15  BP: (130-148)/(61-80) 130/61  SpO2:  [96 %-99 %] 99 %  Body mass index is 25.11 kg/m².     Input and Output Summary (last 24 hours):     Intake/Output Summary (Last 24 hours) at 2024 191  Last data filed at 2024 1446  Gross per 24 hour   Intake 892.16 ml   Output --   Net 892.16 ml       Physical Exam:   Physical Exam   - GEN: elderly , frail ; was somewhat somnolent/sleepy which limited ROS & neuro exam    - HEENT: Anicteric, mucous membranes moist, PERRL and EOMI   - NECK: No lymphadenopathy, JVD or carotid bruits   - HEART: RRR, normal S1 and S2, no murmurs, clicks, gallops or rubs   - LUNGS: Clear to auscultation bilaterally; no wheezes, rales, or rhonchi  - ABDOMEN: Normal bowel sounds, soft, no tenderness, no distention, no organomegaly or masses felt on exam.   - EXTREMITIES: Peripheral pulses normal; no clubbing, cyanosis, or edema  - NEURO: No focal findings, CN II-XII are grossly intact.   - Musculoskeletal: 5/5 strength, normal ROM, no swollen or erythematous joints.   - SKIN: Normal without suspicious lesions on exposed skin      Additional Data:     Labs:  Results from last 7 days   Lab Units 24  1147 24  0529 08/15/24  0450   WBC Thousand/uL 20.72*   < > 16.48*   HEMOGLOBIN g/dL 7.2*   < > 7.1*   HEMATOCRIT % 25.6*   < > 25.1*   PLATELETS Thousands/uL 228   < > 221   SEGS PCT %  --   --  88*   LYMPHO PCT %  --   --  6*   MONO PCT %  --   --  4   EOS PCT %  --   --  1    < > = values in this interval not displayed.     Results from last 7 days   Lab Units 24  1147 24  0529 08/15/24  0450   SODIUM mmol/L 145   < > 148*   POTASSIUM mmol/L 3.1*   < > 3.2*   CHLORIDE mmol/L 103   < > 104   CO2 mmol/L 30   < > 33*   BUN mg/dL 47*  "  < > 47*   CREATININE mg/dL 1.73*   < > 1.51*   ANION GAP mmol/L 12   < > 11   CALCIUM mg/dL 7.7*   < > 7.5*   ALBUMIN g/dL  --   --  2.6*   TOTAL BILIRUBIN mg/dL  --   --  1.65*   ALK PHOS U/L  --   --  86   ALT U/L  --   --  9   AST U/L  --   --  13   GLUCOSE RANDOM mg/dL 221*   < > 151*    < > = values in this interval not displayed.     Results from last 7 days   Lab Units 08/15/24  0450   INR  2.21*     Results from last 7 days   Lab Units 08/17/24  1704 08/17/24  1157 08/17/24  0752 08/16/24  2224 08/16/24  1551 08/16/24  1205 08/16/24  0450 08/16/24  0001 08/15/24  1701 08/15/24  1119 08/15/24  0756 08/14/24  2259   POC GLUCOSE mg/dl 258* 243* 253* 236* 232* 231* 167* 194* 162* 165* 176* 173*         Results from last 7 days   Lab Units 08/17/24  1147 08/16/24  0529 08/14/24  2116 08/14/24  1836   LACTIC ACID mmol/L  --   --  2.1* 2.1*   PROCALCITONIN ng/ml 0.59* 0.43*  --  0.52*       Lines/Drains:  Invasive Devices       Peripheral Intravenous Line  Duration             Peripheral IV 08/14/24 Right Antecubital 2 days              Drain  Duration             External Urinary Catheter 3 days                          Imaging: Reviewed radiology reports from this admission including: chest xray    Recent Cultures (last 7 days):   Results from last 7 days   Lab Units 08/14/24  1930   BLOOD CULTURE  No Growth at 48 hrs.  No Growth at 48 hrs.       Last 24 Hours Medication List:   Current Facility-Administered Medications   Medication Dose Route Frequency Provider Last Rate    acetaminophen  650 mg Oral Q6H PRN Philip Lopez DO      allopurinol  100 mg Oral Daily Philip Lopez DO      apixaban  5 mg Oral BID Philip Lopez DO      dextrose  50 mL/hr Intravenous Continuous Gilberto Haley MD 50 mL/hr (08/17/24 1446)    escitalopram  20 mg Oral Daily Philip Lopez DO      HYDROmorphone  0.2 mg Intravenous Q4H PRN Willa Venegas PA-C      insulin lispro  1-5 Units Subcutaneous TID AC Silver Barron PA-C       lidocaine  1 patch Topical Daily Ben Menchaca DO      metoprolol succinate  25 mg Oral Daily Philip Lopez DO      pantoprazole  40 mg Oral BID Philip Lopez DO      potassium chloride  20 mEq Intravenous Q2H Gilberto Haley MD 20 mEq (08/17/24 5572)    pravastatin  80 mg Oral Daily With Dinner Philip Lopez DO      ticagrelor  90 mg Oral Q12H [facility] Philip Lopez DO          Today, Patient Was Seen By: Ben Menchaca DO    **Please Note: This note may have been constructed using a voice recognition system.**

## 2024-08-17 NOTE — PROGRESS NOTES
NEPHROLOGY PROGRESS NOTE   Anamaria Parnell 79 y.o. female MRN: [id number]  Unit/Bed#: [facility] 726-01 Encounter: [id number]      ASSESSMENT & PLAN:  #1 acute hypernatremia in the setting of decreased oral intake/encephalopathy, improving with D5W, decrease rate to 50 cc/h and continue for another 24 hours.  Encourage oral intake.  Follow daily labs    2 hypokalemia, extra potassium IV ordered.    3.  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  Completing 7 days of antibiotics.  Currently infectious disease on board, antibiotics were discontinued yesterday    5 anemia, hemoglobin low but is stable, monitor H&H    Recommendations as above were discussed with internal medicine attending, he agreed with the plan      SUBJECTIVE:  patient seen and examined, somnolent but arousable, patient's son at bedside.  Patient's son reports she received pain medication recently and made her more sleepy, appetite is still poor.  Patient denies any chest pain or shortness of breath    OBJECTIVE:  Current Weight: Weight - Scale: 56.4 kg (124 lb 5.4 oz)  Vitals:    08/17/24 0703   BP: 148/80   Pulse: 83   Resp:    Temp: 98 °F (36.7 °C)   SpO2: 96%       Intake/Output Summary (Last 24 hours) at 8/17/2024 1328  Last data filed at 8/17/2024 0744  Gross per 24 hour   Intake 886.33 ml   Output --   Net 886.33 ml       General: Somnolent but arousable, cooperative, in not acute distress  Eyes: conjunctivae pink, anicteric sclerae  ENT: lips and mucous membranes dry  Neck: supple, no JVD  Chest: clear breath sounds bilateral, no crackles, ronchus or wheezings  CVS: distinct S1 & S2, normal rate, regular rhythm  Abdomen: soft, non-tender, non-distended, normoactive bowel sounds  Extremities: edema of both legs  Skin: no rash  Neuro: Somnolent but arousable, alert to person      Medications:    Current Facility-Administered  Medications:     acetaminophen (TYLENOL) tablet 650 mg, 650 mg, Oral, Q6H PRN, Philip Lopez DO, 650 mg at 08/16/24 1100    allopurinol (ZYLOPRIM) tablet 100 mg, 100 mg, Oral, Daily, Philip Lopez DO, 100 mg at 08/17/24 0957    apixaban (ELIQUIS) tablet 5 mg, 5 mg, Oral, BID, Philip Lopez DO, 5 mg at 08/17/24 0956    dextrose 5 % infusion, 50 mL/hr, Intravenous, Continuous, Gilberto Haley MD, Stopped at 08/17/24 0744    escitalopram (LEXAPRO) tablet 20 mg, 20 mg, Oral, Daily, Philip Lopez DO, 20 mg at 08/17/24 0956    HYDROmorphone HCl (DILAUDID) injection 0.2 mg, 0.2 mg, Intravenous, Q4H PRN, Willa Venegas PA-C, 0.2 mg at 08/17/24 1239    insulin lispro (HumALOG/ADMELOG) 100 units/mL subcutaneous injection 1-5 Units, 1-5 Units, Subcutaneous, TID AC, 2 Units at 08/17/24 1240 **AND** Fingerstick Glucose (POCT), , , TID AC, Silver Barron PA-C    insulin lispro (HumALOG/ADMELOG) 100 units/mL subcutaneous injection 1-5 Units, 1-5 Units, Subcutaneous, HS, Silver Barron PA-C, 2 Units at 08/16/24 2226    metoprolol succinate (TOPROL-XL) 24 hr tablet 25 mg, 25 mg, Oral, Daily, Philip Lopez DO, 25 mg at 08/17/24 0957    pantoprazole (PROTONIX) EC tablet 40 mg, 40 mg, Oral, BID, Philip Lopez DO, 40 mg at 08/17/24 0957    potassium chloride 40 mEq IVPB (premix), 40 mEq, Intravenous, Once, Gilberto Haley MD    pravastatin (PRAVACHOL) tablet 80 mg, 80 mg, Oral, Daily With Dinner, Philip Lopez DO, 80 mg at 08/16/24 1707    ticagrelor (BRILINTA) tablet 90 mg, 90 mg, Oral, Q12H Philip HERRERA DO, 90 mg at 08/17/24 0957    Invasive Devices:        Lab Results:   Results from last 7 days   Lab Units 08/17/24  1147 08/16/24  0529 08/15/24  0450 08/14/24  1836   WBC Thousand/uL 20.72* 15.81* 16.48* 20.50*   HEMOGLOBIN g/dL 7.2* 7.3* 7.1* 8.4*   HEMATOCRIT % 25.6* 26.9* 25.1* 30.0*   PLATELETS Thousands/uL 228 219 221 242   SODIUM mmol/L 145 153* 148* 147   POTASSIUM mmol/L 3.1* 3.3* 3.2* 3.6   CHLORIDE  "mmol/L 103 108 104 104   CO2 mmol/L 30 31 33* 34*   BUN mg/dL 47* 48* 47* 51*   CREATININE mg/dL 1.73* 1.79* 1.51* 1.59*   CALCIUM mg/dL 7.7* 8.0* 7.5* 7.8*   MAGNESIUM mg/dL 2.0 2.1 2.0  --    PHOSPHORUS mg/dL 3.6  --   --   --    ALK PHOS U/L  --   --  86 94   ALT U/L  --   --  9 10   AST U/L  --   --  13 16             Portions of the record may have been created with voice recognition software. Occasional wrong word or \"sound a like\" substitutions may have occurred due to the inherent limitations of voice recognition software. Read the chart carefully and recognize, using context, where substitutions have occurred.If you have any questions, please contact the dictating provider.  "

## 2024-08-17 NOTE — PROGRESS NOTES
"Vascular Surgery  Progress Note   Anamaria Parnell 79 y.o. female MRN: [id number]  Unit/Bed#: [facility] 726-01 Encounter: [id number]    Assessment:  Ms. Parnell is a 80yo female with multiple comorbidities, AOID/PAD with R hallux gangrene s/p 1st stage b/l CFAE, left JACI/EIA POBA/shockwave lithotripsy with stenting and left to right fem-fem bypass, b/l groin vac 24 at [facility], R hallux amp 24. Post-op course c/b persistent leukocytosis, AMS, Afib, tachy-jeff syndrome, decreased EF, encephalopathy with lactic acidosis, shock liver. Vascular surgery following for b/l groin wound care.       Plan:  -Continue daily dressing changes of the b/l groin wounds   -Follow up palliative care discussion  -Rest of care per primary    Subjective/Objective     Subjective: Pt is encephalopathic and does not answer questions appropriately.    Objective:     Vitals:Blood pressure 148/80, pulse 83, temperature 98 °F (36.7 °C), resp. rate 18, height 4' 11\" (1.499 m), weight 56.4 kg (124 lb 5.4 oz), SpO2 96%.,Body mass index is 25.11 kg/m².     Temp (24hrs), Av.1 °F (36.7 °C), Min:97.4 °F (36.3 °C), Max:98.9 °F (37.2 °C)  Current: Temperature: 98 °F (36.7 °C)      Intake/Output Summary (Last 24 hours) at 2024 0812  Last data filed at 2024 0744  Gross per 24 hour   Intake 886.33 ml   Output --   Net 886.33 ml       Invasive Devices       Peripheral Intravenous Line  Duration             Peripheral IV 24 Right Antecubital 2 days              Drain  Duration             External Urinary Catheter 2 days                    Physical Exam:  General: No acute distress, alert and oriented  CV: Well perfused, regular rate and rhythm  Lungs: Normal work of breathing, no increased respiratory effort  Abdomen: Soft, non-tender, non-distended.  Groin b/l groin wound dressings changed at bedside; no evidence of infection although tissue quality is poor with moderate fibrinous exudate which was mechanically debrided with " gauze.  Extremities: Right foot wound cleanly dressed  Skin: Warm, dry    Lab Results: Results: I have personally reviewed all pertinent laboratory/tests results  VTE Prophylaxis: Sequential compression device (Venodyne)     Harpal Kat MD  8/17/2024

## 2024-08-18 NOTE — ASSESSMENT & PLAN NOTE
"Sodium 153 this AM, in the setting of poor PO intake/NPO  Check imaging on admission did reveal \"Small right effusion, smaller left effusion, suggest volume overload and pulmonary congestion\"  Patient currently satting well on room air  Gentle IVF with D5W  Nephrology consulted - sodium improved and Dextrose 5% decreased to 50 ml/hr  "

## 2024-08-18 NOTE — PROGRESS NOTES
NEPHROLOGY PROGRESS NOTE   Anamaria Parnell 79 y.o. female MRN: [id number]  Unit/Bed#: [facility] 726-01 Encounter: [id number]      ASSESSMENT & PLAN:  #1 acute hypernatremia in the setting of decreased oral intake/encephalopathy, hypernatremia improved with hypotonic IV fluids.  Sodium 142 this morning  Continue with D5W at 50 for now.  Encourage oral intake.  Plan for patient to be discharged on home hospice in the next 24 to 48 hours.  Nephrology will sign off, call if any questions    2 hypokalemia, continue replacement as needed    3.  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  Completing 7 days of antibiotics.  Currently infectious disease on board, antibiotics were discontinued 8/16    5 anemia, hemoglobin low but is stable, monitor H&H, last hemoglobin 7.6 this morning    Recommendations as above were discussed with internal medicine team, continue with gentle hypotonic IV fluids, plan for patient to be discharge on home hospice, no further recommendation from kidney standpoint of view, will sign off, call if any questions      SUBJECTIVE:  She is seen and examined this morning, somnolent but arousable, try to answer some questions but ROS limited    OBJECTIVE:  Current Weight: Weight - Scale: 56.4 kg (124 lb 5.4 oz)  Vitals:    08/18/24 0730   BP: 94/63   Pulse: 89   Resp:    Temp: (!) 96.7 °F (35.9 °C)   SpO2: 96%       Intake/Output Summary (Last 24 hours) at 8/18/2024 1203  Last data filed at 8/18/2024 0501  Gross per 24 hour   Intake 718.33 ml   Output --   Net 718.33 ml     General: Somnolent but arousable, cooperative, in not acute distress  Eyes: conjunctivae pale, anicteric sclerae  ENT: lips and mucous membranes dry  Neck: supple, no JVD  Chest: clear breath sounds bilateral, no crackles, ronchus or wheezings  CVS: distinct S1 & S2, normal rate, regular  rhythm  Abdomen: soft, non-tender, non-distended, normoactive bowel sounds  Extremities: no edema of both legs  Skin: no rash  Neuro: Somnolent but arousable        Medications:    Current Facility-Administered Medications:     acetaminophen (TYLENOL) tablet 650 mg, 650 mg, Oral, Q6H PRN, Philip Lopez DO, 650 mg at 08/17/24 1440    allopurinol (ZYLOPRIM) tablet 100 mg, 100 mg, Oral, Daily, Philip Lopez DO, 100 mg at 08/18/24 1047    apixaban (ELIQUIS) tablet 5 mg, 5 mg, Oral, BID, Philip Lopez DO, 5 mg at 08/18/24 1047    dextrose 5 % infusion, 50 mL/hr, Intravenous, Continuous, Gilberto Haley MD, Last Rate: 50 mL/hr at 08/17/24 1446, 50 mL/hr at 08/17/24 1446    escitalopram (LEXAPRO) tablet 20 mg, 20 mg, Oral, Daily, Philip Lopez DO, 20 mg at 08/18/24 1047    HYDROmorphone HCl (DILAUDID) injection 0.2 mg, 0.2 mg, Intravenous, Q4H PRN, Willa Venegas PA-C, 0.2 mg at 08/18/24 0418    insulin lispro (HumALOG/ADMELOG) 100 units/mL subcutaneous injection 1-5 Units, 1-5 Units, Subcutaneous, TID AC, 2 Units at 08/17/24 1736 **AND** Fingerstick Glucose (POCT), , , TID AC, Silver Barron PA-C    lidocaine (LIDODERM) 5 % patch 1 patch, 1 patch, Topical, Daily, Ben Menchaca DO, 1 patch at 08/18/24 1047    metoprolol succinate (TOPROL-XL) 24 hr tablet 25 mg, 25 mg, Oral, Daily, Philip Lopez DO, 25 mg at 08/18/24 1047    pantoprazole (PROTONIX) EC tablet 40 mg, 40 mg, Oral, BID, Philip Lopez DO, 40 mg at 08/18/24 1047    pravastatin (PRAVACHOL) tablet 80 mg, 80 mg, Oral, Daily With Dinner, Philip Lopez DO, 80 mg at 08/17/24 1737    ticagrelor (BRILINTA) tablet 90 mg, 90 mg, Oral, Q12H [facility], Philip Lopez DO, 90 mg at 08/18/24 1055    Invasive Devices:        Lab Results:   Results from last 7 days   Lab Units 08/18/24  0514 08/17/24  1147 08/16/24  0529 08/15/24  0450 08/14/24  1836   WBC Thousand/uL 25.46* 20.72* 15.81* 16.48* 20.50*   HEMOGLOBIN g/dL 7.6* 7.2* 7.3* 7.1* 8.4*   HEMATOCRIT % 27.2* 25.6* 26.9*  "25.1* 30.0*   PLATELETS Thousands/uL 213 228 219 221 242   SODIUM mmol/L 142 145 153* 148* 147   POTASSIUM mmol/L 3.2* 3.1* 3.3* 3.2* 3.6   CHLORIDE mmol/L 101 103 108 104 104   CO2 mmol/L 28 30 31 33* 34*   BUN mg/dL 42* 47* 48* 47* 51*   CREATININE mg/dL 1.49* 1.73* 1.79* 1.51* 1.59*   CALCIUM mg/dL 7.8* 7.7* 8.0* 7.5* 7.8*   MAGNESIUM mg/dL  --  2.0 2.1 2.0  --    PHOSPHORUS mg/dL  --  3.6  --   --   --    ALK PHOS U/L  --   --   --  86 94   ALT U/L  --   --   --  9 10   AST U/L  --   --   --  13 16             Portions of the record may have been created with voice recognition software. Occasional wrong word or \"sound a like\" substitutions may have occurred due to the inherent limitations of voice recognition software. Read the chart carefully and recognize, using context, where substitutions have occurred.If you have any questions, please contact the dictating provider.  "

## 2024-08-18 NOTE — ASSESSMENT & PLAN NOTE
Status post right hallux amputation and partial first ray resection 7/29/2024.    The wound is still open and there is some necrotic tissue but no obvious signs of infection.  Pods input appreciated

## 2024-08-18 NOTE — PLAN OF CARE
Problem: Potential for Falls  Goal: Patient will remain free of falls  Description: INTERVENTIONS:  - Educate patient/family on patient safety including physical limitations  - Instruct patient to call for assistance with activity   - Consult OT/PT to assist with strengthening/mobility   - Keep Call bell within reach  - Keep bed low and locked with side rails adjusted as appropriate  - Keep care items and personal belongings within reach  - Initiate and maintain comfort rounds  - Make Fall Risk Sign visible to staff  - Offer Toileting every   Hours, in advance of need  - Initiate/Maintain  alarm  - Obtain necessary fall risk management equipment:    - Apply yellow socks and bracelet for high fall risk patients  - Consider moving patient to room near nurses station  Outcome: Progressing     Problem: INFECTION - ADULT  Goal: Absence or prevention of progression during hospitalization  Description: INTERVENTIONS:  - Assess and monitor for signs and symptoms of infection  - Monitor lab/diagnostic results  - Monitor all insertion sites, i.e. indwelling lines, tubes, and drains  - Monitor endotracheal if appropriate and nasal secretions for changes in amount and color  - [place] appropriate cooling/warming therapies per order  - Administer medications as ordered  - Instruct and encourage patient and family to use good hand hygiene technique  - Identify and instruct in appropriate isolation precautions for identified infection/condition  Outcome: Progressing  Goal: Absence of fever/infection during neutropenic period  Description: INTERVENTIONS:  - Monitor WBC    Outcome: Progressing     Problem: SAFETY ADULT  Goal: Patient will remain free of falls  Description: INTERVENTIONS:  - Educate patient/family on patient safety including physical limitations  - Instruct patient to call for assistance with activity   - Consult OT/PT to assist with strengthening/mobility   - Keep Call bell within reach  - Keep bed low and  locked with side rails adjusted as appropriate  - Keep care items and personal belongings within reach  - Initiate and maintain comfort rounds  - Make Fall Risk Sign visible to staff  - Offer Toileting every   Hours, in advance of need  - Initiate/Maintain  alarm  - Obtain necessary fall risk management equipment:    - Apply yellow socks and bracelet for high fall risk patients  - Consider moving patient to room near nurses station  Outcome: Progressing  Goal: Maintain or return to baseline ADL function  Description: INTERVENTIONS:  -  Assess patient's ability to carry out ADLs; assess patient's baseline for ADL function and identify physical deficits which impact ability to perform ADLs (bathing, care of mouth/teeth, toileting, grooming, dressing, etc.)  - Assess/evaluate cause of self-care deficits   - Assess range of motion  - Assess patient's mobility; develop plan if impaired  - Assess patient's need for assistive devices and provide as appropriate  - Encourage maximum independence but intervene and supervise when necessary  - Involve family in performance of ADLs  - Assess for home care needs following discharge   - Consider OT consult to assist with ADL evaluation and planning for discharge  - Provide patient education as appropriate  Outcome: Progressing  Goal: Maintains/Returns to pre admission functional level  Description: INTERVENTIONS:  - Perform AM-PAC 6 Click Basic Mobility/ Daily Activity assessment daily.  - Set and communicate daily mobility goal to care team and patient/family/caregiver.   - Collaborate with rehabilitation services on mobility goals if consulted  - Perform Range of Motion   times a day.  - Reposition patient every   hours.  - Dangle patient   times a day  - Stand patient   times a day  - Ambulate patient   times a day  - Out of bed to chair   times a day   - Out of bed for meals    times a day  - Out of bed for toileting  - Record patient progress and toleration of activity level    Outcome: Progressing     Problem: DISCHARGE PLANNING  Goal: Discharge to home or other facility with appropriate resources  Description: INTERVENTIONS:  - Identify barriers to discharge w/patient and caregiver  - Arrange for needed discharge resources and transportation as appropriate  - Identify discharge learning needs (meds, wound care, etc.)  - Arrange for interpretive services to assist at discharge as needed  - Refer to Case Management Department for coordinating discharge planning if the patient needs post-hospital services based on physician/advanced practitioner order or complex needs related to functional status, cognitive ability, or social support system  Outcome: Progressing     Problem: Knowledge Deficit  Goal: Patient/family/caregiver demonstrates understanding of disease process, treatment plan, medications, and discharge instructions  Description: Complete learning assessment and assess knowledge base.  Interventions:  - Provide teaching at level of understanding  - Provide teaching via preferred learning methods  Outcome: Progressing     Problem: NEUROSENSORY - ADULT  Goal: Achieves stable or improved neurological status  Description: INTERVENTIONS  - Monitor and report changes in neurological status  - Monitor vital signs such as temperature, blood pressure, glucose, and any other labs ordered   - Initiate measures to prevent increased intracranial pressure  - Monitor for seizure activity and implement precautions if appropriate      Outcome: Progressing  Goal: Achieves maximal functionality and self care  Description: INTERVENTIONS  - Monitor swallowing and airway patency with patient fatigue and changes in neurological status  - Encourage and assist patient to increase activity and self care.   - Encourage visually impaired, hearing impaired and aphasic patients to use assistive/communication devices  Outcome: Progressing     Problem: RESPIRATORY - ADULT  Goal: Achieves optimal ventilation and  oxygenation  Description: INTERVENTIONS:  - Assess for changes in respiratory status  - Assess for changes in mentation and behavior  - Position to facilitate oxygenation and minimize respiratory effort  - Oxygen administered by appropriate delivery if ordered  - Initiate smoking cessation education as indicated  - Encourage broncho-pulmonary hygiene including cough, deep breathe, Incentive Spirometry  - Assess the need for suctioning and aspirate as needed  - Assess and instruct to report SOB or any respiratory difficulty  - Respiratory Therapy support as indicated  Outcome: Progressing     Problem: GENITOURINARY - ADULT  Goal: Maintains or returns to baseline urinary function  Description: INTERVENTIONS:  - Assess urinary function  - Encourage oral fluids to ensure adequate hydration if ordered  - Administer IV fluids as ordered to ensure adequate hydration  - Administer ordered medications as needed  - Offer frequent toileting  - Follow urinary retention protocol if ordered  Outcome: Progressing     Problem: METABOLIC, FLUID AND ELECTROLYTES - ADULT  Goal: Electrolytes maintained within normal limits  Description: INTERVENTIONS:  - Monitor labs and assess patient for signs and symptoms of electrolyte imbalances  - Administer electrolyte replacement as ordered  - Monitor response to electrolyte replacements, including repeat lab results as appropriate  - Instruct patient on fluid and nutrition as appropriate  Outcome: Progressing  Goal: Fluid balance maintained  Description: INTERVENTIONS:  - Monitor labs   - Monitor I/O and WT  - Instruct patient on fluid and nutrition as appropriate  - Assess for signs & symptoms of volume excess or deficit  Outcome: Progressing  Goal: Glucose maintained within target range  Description: INTERVENTIONS:  - Monitor Blood Glucose as ordered  - Assess for signs and symptoms of hyperglycemia and hypoglycemia  - Administer ordered medications to maintain glucose within target range  -  Assess nutritional intake and initiate nutrition service referral as needed  Outcome: Progressing     Problem: HEMATOLOGIC - ADULT  Goal: Maintains hematologic stability  Description: INTERVENTIONS  - Assess for signs and symptoms of bleeding or hemorrhage  - Monitor labs  - Administer supportive blood products/factors as ordered and appropriate  Outcome: Progressing     Problem: Prexisting or High Potential for Compromised Skin Integrity  Goal: Skin integrity is maintained or improved  Description: INTERVENTIONS:  - Identify patients at risk for skin breakdown  - Assess and monitor skin integrity  - Assess and monitor nutrition and hydration status  - Monitor labs   - Assess for incontinence   - Turn and reposition patient  - Assist with mobility/ambulation  - Relieve pressure over bony prominences  - Avoid friction and shearing  - Provide appropriate hygiene as needed including keeping skin clean and dry  - Evaluate need for skin moisturizer/barrier cream  - Collaborate with interdisciplinary team   - Patient/family teaching  - Consider wound care consult   Outcome: Progressing     Problem: Nutrition/Hydration-ADULT  Goal: Nutrient/Hydration intake appropriate for improving, restoring or maintaining nutritional needs  Description: Monitor and assess patient's nutrition/hydration status for malnutrition. Collaborate with interdisciplinary team and initiate plan and interventions as ordered.  Monitor patient's weight and dietary intake as ordered or per policy. Utilize nutrition screening tool and intervene as necessary. Determine patient's food preferences and provide high-protein, high-caloric foods as appropriate.     INTERVENTIONS:  - Monitor oral intake, urinary output, labs, and treatment plans  - Assess nutrition and hydration status and recommend course of action  - Evaluate amount of meals eaten  - Assist patient with eating if necessary   - Allow adequate time for meals  - Recommend/ encourage appropriate  diets, oral nutritional supplements, and vitamin/mineral supplements  - Order, calculate, and assess calorie counts as needed  - Recommend, monitor, and adjust tube feedings and TPN/PPN based on assessed needs  - Assess need for intravenous fluids  - Provide specific nutrition/hydration education as appropriate  - Include patient/family/caregiver in decisions related to nutrition  Outcome: Progressing     Problem: SAFETY,RESTRAINT: NV/NON-SELF DESTRUCTIVE BEHAVIOR  Goal: Remains free of harm/injury (restraint for non violent/non self-detsructive behavior)  Description: INTERVENTIONS:  - Instruct patient/family regarding restraint use   - Assess and monitor physiologic and psychological status   - Provide interventions and comfort measures to meet assessed patient needs   - Identify and implement measures to help patient regain control  - Assess readiness for release of restraint   Outcome: Progressing  Goal: Returns to optimal restraint-free functioning  Description: INTERVENTIONS:  - Assess the patient's behavior and symptoms that indicate continued need for restraint  - Identify and implement measures to help patient regain control  - Assess readiness for release of restraint   Outcome: Progressing

## 2024-08-18 NOTE — ASSESSMENT & PLAN NOTE
"Presented with SIRS and AMS, initially concerned for sepsis due to pneumonia   CT head on admission revealed, \"No acute intracranial abnormality. Chronic left PCA territory infarct, stable.\"  Patient has reported baseline memory issues and son reported to admitting provider that she has been increasingly somnolent since prior hospitalization  Delirium precautions  "

## 2024-08-18 NOTE — PROGRESS NOTES
"Podiatry - Progress Note  Patient: Anamaria Parnell 79 y.o. female   MRN: [id number]  PCP: Ritchie Lawton MD  [facility] Encounter: [id number]  Date Of Visit: 24    ASSESSMENT:    Anamaria Parnell is a 79 y.o. female with:    Open wound right foot with exposed metatarsal  -Status post right partial first ray amputation date of surgery 2024  Severe sepsis, possible aspiration  pneumonia  Chronic heart failure reduced ejection fraction, LVEF 30%  Chronic kidney disease stage IV  Paroxysmal atrial fibrillation  Type 2 diabetes mellitus      PLAN:    Dressing changed at bedside today.  Right foot wound noted to be stable and dry with no acute signs of infection.  Exposed first metatarsal head.  New Betadine soaked Adaptic dressing reapplied.  Plan to continue with palliative local wound care.  Patient with uptrending leukocytosis this a.m. of 25.  Patient is afebrile.  Will continue to trend labs and vitals while in-patient  Patient not currently on antibiotics per ID recommendation to stop ceftriaxone and monitor off antibiotics  Patient and son electing to proceed with home hospice with planned transition date of   Elevation and offloading on green foam wedges or pillows when non-ambulatory.  Rest of care per primary team.     Weightbearing status: Weightbearing as tolerated to right heel for transfers    SUBJECTIVE:     The patient was seen, evaluated, and assessed at bedside today. The patient was awake and in no acute distress. No acute events overnight. Patient denies N/V/F/chills/SOB/CP.      OBJECTIVE:     Vitals:   BP 94/63   Pulse 89   Temp (!) 96.7 °F (35.9 °C)   Resp 18   Ht 4' 11\" (1.499 m)   Wt 56.4 kg (124 lb 5.4 oz)   LMP  (LMP Unknown)   SpO2 96%   BMI 25.11 kg/m²     Temp (24hrs), Av.6 °F (35.9 °C), Min:96.3 °F (35.7 °C), Max:96.7 °F (35.9 °C)      Physical Exam:     Lungs: Non labored breathing  Abdomen: Soft, non-tender.  Lower Extremity:  Cardiovascular " "status at baseline from admission.  Neurological status at baseline from admission.  Musculoskeletal status at baseline from admission. No calf tenderness noted.     Wound #: 1  Location: Right first metatarsal  Length 5.0 cm: Width 3.0 cm: Depth 2.0 cm:   Deepest Tissue Noted in Base: Bone  Probe to Bone: Yes  Peripheral Skin Description: Attached  Granulation: 30% Fibrotic Tissue: 40% Necrotic Tissue: 30%   Drainage Amount: Minimal  Exposed first metatarsal head    Clinical Images 08/18/24:      Additional Data:     Labs:    Results from last 7 days   Lab Units 08/18/24  0514 08/16/24  0529 08/15/24  0450   WBC Thousand/uL 25.46*   < > 16.48*   HEMOGLOBIN g/dL 7.6*   < > 7.1*   HEMATOCRIT % 27.2*   < > 25.1*   PLATELETS Thousands/uL 213   < > 221   SEGS PCT %  --   --  88*   LYMPHO PCT % 1*  --  6*   MONO PCT % 1*  --  4   EOS PCT % 0  --  1    < > = values in this interval not displayed.     Results from last 7 days   Lab Units 08/18/24  0514 08/16/24  0529 08/15/24  0450   POTASSIUM mmol/L 3.2*   < > 3.2*   CHLORIDE mmol/L 101   < > 104   CO2 mmol/L 28   < > 33*   BUN mg/dL 42*   < > 47*   CREATININE mg/dL 1.49*   < > 1.51*   CALCIUM mg/dL 7.8*   < > 7.5*   ALK PHOS U/L  --   --  86   ALT U/L  --   --  9   AST U/L  --   --  13    < > = values in this interval not displayed.     Results from last 7 days   Lab Units 08/15/24  0450   INR  2.21*       * I Have Reviewed All Lab Data Listed Above.    Recent Cultures (last 7 days):     Results from last 7 days   Lab Units 08/14/24  1930   BLOOD CULTURE  No Growth at 72 hrs.  No Growth at 72 hrs.           Imaging: I have personally reviewed pertinent films in PACS  EKG, Pathology, and Other Studies: I have personally reviewed pertinent reports.    ** Please Note: Portions of the record may have been created with voice recognition software. Occasional wrong word or \"sound a like\" substitutions may have occurred due to the inherent limitations of voice recognition " software. Read the chart carefully and recognize, using context, where substitutions have occurred. **

## 2024-08-18 NOTE — PROGRESS NOTES
"[facility]  Progress Note  Name: Anamaria Parnell I  MRN: [id number]  Unit/Bed#: [facility] 726-01 I Date of Admission: 8/14/2024   Date of Service: 8/18/2024 I Hospital Day: 4    Assessment & Plan   * Goals of care, counseling/discussion  Assessment & Plan  Plan for home hospice , transportation on Wednesday 8/21  Son Austin prefer to keep lever 3 DNR/DNI for now while in the hospital including medical management as it is but no aggressive/surgical interventions   PRN Pain meds in place   Hospice and CM on board and arranging    Hypernatremia  Assessment & Plan  Sodium 153 this AM, in the setting of poor PO intake/NPO  Check imaging on admission did reveal \"Small right effusion, smaller left effusion, suggest volume overload and pulmonary congestion\"  Patient currently satting well on room air  Gentle IVF with D5W  Nephrology consulted - sodium improved and Dextrose 5% decreased to 50 ml/hr    Gastrointestinal stromal tumor (GIST) (HCC)  Assessment & Plan  CT imaging with evidence of GIST  Reviewed last prolonged hospitalization in which patient was noted to have melena and it does appear that this GIST was noted on CTA imaging on 7/18; does also appear from notes that the GI team who was seeing patient during last hospitalization.   Patient and son made aware, and now patient is opted into hospice care , with plan for home hospice on Wednesday. Therefore no plan for further GIST workup or treatment given comfort care only.     Acute metabolic encephalopathy  Assessment & Plan  Presented with SIRS and AMS, initially concerned for sepsis due to pneumonia   CT head on admission revealed, \"No acute intracranial abnormality. Chronic left PCA territory infarct, stable.\"  Patient has reported baseline memory issues and son reported to admitting provider that she has been increasingly somnolent since prior hospitalization  Delirium precautions    Paroxysmal atrial fibrillation " ([facility])  Assessment & Plan  Continue PTA metoprolol as well as Eliquis  Patient's son prefers and asked to continue medical management including AC for now while inpatient till home hospice transportation     Diabetic ulcer of toe of right foot associated with diabetes mellitus due to underlying condition, limited to breakdown of skin ([facility])  Assessment & Plan  Status post right hallux amputation and partial first ray resection 7/29/2024.    The wound is still open and there is some necrotic tissue but no obvious signs of infection.  Pods input appreciated     Aortoiliac occlusive disease ([facility])  Assessment & Plan  Recent hospitalization for bilateral femoral endarterectomy with bypass and JACI/EIA stenting as well as right first toe amputation  Was evaluated by Vascular Surgery on admission who did not feel that wound infection was present  Continue Brilinta as well as statin    Type 2 diabetes mellitus with diabetic nephropathy ([facility])  Assessment & Plan  Lab Results   Component Value Date    HGBA1C 5.9 (H) 07/09/2024     Recent Labs     08/17/24  1704 08/18/24  0652 08/18/24  0731 08/18/24  1138   POCGLU 258* 234* 202* 222*       Blood Sugar Average: Last 72 hrs:  (P) 212.5    Controlled based on recent A1c, however BS high in the setting of dextrose infusions  Hold scheduled Lantus for now  Given planned hospice , no benefit of tight control or low carb diet. Check daily ~ 200's            VTE Pharmacologic Prophylaxis: VTE Score: 5 High Risk (Score >/= 5) - Pharmacological DVT Prophylaxis Ordered: apixaban (Eliquis). Sequential Compression Devices Ordered.    Mobility:   Basic Mobility Inpatient Raw Score: 6  JH-HLM Goal: 2: Bed activities/Dependent transfer  JH-HLM Achieved: 1: Laying in bed  JH-HLM Goal NOT achieved. Continue with multidisciplinary rounding and encourage appropriate mobility to improve upon JH-HLM goals.    Patient Centered Rounds: I performed bedside rounds with nursing staff today.   Discussions  with Specialists or Other Care Team Provider: nursing     Education and Discussions with Family / Patient: Updated  (son) at bedside.    Total Time Spent on Date of Encounter in care of patient:This time was spent on one or more of the following: performing physical exam; counseling and coordination of care; obtaining or reviewing history; documenting in the medical record; reviewing/ordering tests, medications or procedures; communicating with other healthcare professionals and discussing with patient's family/caregivers.    Current Length of Stay: 4 day(s)  Current Patient Status: Inpatient   Certification Statement: The patient will continue to require additional inpatient hospital stay due to safe d/c planning home with hospice   Discharge Plan: Anticipate discharge in 48-72 hrs to home with home services.    Code Status: Level 3 - DNAR and DNI    Subjective:   Pt seen and examined at bedside. Son and dtr in law at bedside. Complains of pain on bottom where wounds are. Still not eating.     Objective:     Vitals:   Temp (24hrs), Av.6 °F (35.9 °C), Min:96.3 °F (35.7 °C), Max:96.7 °F (35.9 °C)    Temp:  [96.3 °F (35.7 °C)-96.7 °F (35.9 °C)] 96.7 °F (35.9 °C)  HR:  [79-89] 89  Resp:  [15-18] 18  BP: ()/(61-71) 94/63  SpO2:  [96 %-100 %] 96 %  Body mass index is 25.11 kg/m².     Input and Output Summary (last 24 hours):     Intake/Output Summary (Last 24 hours) at 2024 1235  Last data filed at 2024 0501  Gross per 24 hour   Intake 718.33 ml   Output --   Net 718.33 ml       Physical Exam:   Physical Exam  Constitutional:       Appearance: Normal appearance. She is obese. She is ill-appearing.   HENT:      Head: Normocephalic and atraumatic.      Mouth/Throat:      Mouth: Mucous membranes are moist.   Eyes:      Comments: Keep eyes closed during exam    Cardiovascular:      Comments: Deferred while on hospice   Pulmonary:      Comments: Deferred while on hospice   Abdominal:       Palpations: Abdomen is soft.      Tenderness: There is abdominal tenderness.   Musculoskeletal:      Cervical back: Normal range of motion and neck supple.   Skin:     Coloration: Skin is pale.   Neurological:      Mental Status: She is alert. She is disoriented.   Psychiatric:      Comments: Confused        Additional Data:     Labs:  Results from last 7 days   Lab Units 08/18/24  0514 08/16/24  0529 08/15/24  0450   WBC Thousand/uL 25.46*   < > 16.48*   HEMOGLOBIN g/dL 7.6*   < > 7.1*   HEMATOCRIT % 27.2*   < > 25.1*   PLATELETS Thousands/uL 213   < > 221   SEGS PCT %  --   --  88*   LYMPHO PCT % 1*  --  6*   MONO PCT % 1*  --  4   EOS PCT % 0  --  1    < > = values in this interval not displayed.     Results from last 7 days   Lab Units 08/18/24  0514 08/16/24  0529 08/15/24  0450   SODIUM mmol/L 142   < > 148*   POTASSIUM mmol/L 3.2*   < > 3.2*   CHLORIDE mmol/L 101   < > 104   CO2 mmol/L 28   < > 33*   BUN mg/dL 42*   < > 47*   CREATININE mg/dL 1.49*   < > 1.51*   ANION GAP mmol/L 13   < > 11   CALCIUM mg/dL 7.8*   < > 7.5*   ALBUMIN g/dL  --   --  2.6*   TOTAL BILIRUBIN mg/dL  --   --  1.65*   ALK PHOS U/L  --   --  86   ALT U/L  --   --  9   AST U/L  --   --  13   GLUCOSE RANDOM mg/dL 233*   < > 151*    < > = values in this interval not displayed.     Results from last 7 days   Lab Units 08/15/24  0450   INR  2.21*     Results from last 7 days   Lab Units 08/18/24  1138 08/18/24  0731 08/18/24  0652 08/17/24  1704 08/17/24  1157 08/17/24  0752 08/16/24  2224 08/16/24  1551 08/16/24  1205 08/16/24  0450 08/16/24  0001 08/15/24  1701   POC GLUCOSE mg/dl 222* 202* 234* 258* 243* 253* 236* 232* 231* 167* 194* 162*         Results from last 7 days   Lab Units 08/17/24  1147 08/16/24  0529 08/14/24  2116 08/14/24  1836   LACTIC ACID mmol/L  --   --  2.1* 2.1*   PROCALCITONIN ng/ml 0.59* 0.43*  --  0.52*       Lines/Drains:  Invasive Devices       Peripheral Intravenous Line  Duration             Peripheral IV  08/14/24 Right Antecubital 3 days                          Imaging: Reviewed radiology reports from this admission including: CT head    Recent Cultures (last 7 days):   Results from last 7 days   Lab Units 08/14/24  1930   BLOOD CULTURE  No Growth at 72 hrs.  No Growth at 72 hrs.       Last 24 Hours Medication List:   Current Facility-Administered Medications   Medication Dose Route Frequency Provider Last Rate    acetaminophen  650 mg Oral Q6H PRN Philip Lopez, DO      allopurinol  100 mg Oral Daily Philip Lopez, DO      apixaban  5 mg Oral BID Philip Lopez, DO      dextrose  50 mL/hr Intravenous Continuous Gilberto Haley MD 50 mL/hr (08/17/24 1446)    escitalopram  20 mg Oral Daily Philip Lopez, DO      HYDROmorphone  0.2 mg Intravenous Q4H PRN Willa Venegas PA-C      insulin lispro  1-5 Units Subcutaneous TID AC Silver Barron PA-C      lidocaine  1 patch Topical Daily Ben Aiad, DO      metoprolol succinate  25 mg Oral Daily Philip Lopez, DO      pantoprazole  40 mg Oral BID Philip Lopez, DO      pravastatin  80 mg Oral Daily With Dinner Philip Lopez, DO      ticagrelor  90 mg Oral Q12H [facility] Philip Lopez DO          Today, Patient Was Seen By: Noah Judge PA-C    **Please Note: This note may have been constructed using a voice recognition system.**

## 2024-08-18 NOTE — ASSESSMENT & PLAN NOTE
Continue PTA metoprolol as well as Eliquis  Patient's son prefers and asked to continue medical management including AC for now while inpatient till home hospice transportation

## 2024-08-18 NOTE — ASSESSMENT & PLAN NOTE
Lab Results   Component Value Date    HGBA1C 5.9 (H) 07/09/2024     Recent Labs     08/17/24  1704 08/18/24  0652 08/18/24  0731 08/18/24  1138   POCGLU 258* 234* 202* 222*       Blood Sugar Average: Last 72 hrs:  (P) 212.5    Controlled based on recent A1c, however BS high in the setting of dextrose infusions  Hold scheduled Lantus for now  Given planned hospice , no benefit of tight control or low carb diet. Check daily ~ 200's

## 2024-08-19 NOTE — PROGRESS NOTES
[facility]  Progress Note  Name: Anamaria Parnell I  MRN: [id number]  Unit/Bed#: [facility] 726-01 I Date of Admission: 8/14/2024   Date of Service: 8/19/2024 I Hospital Day: 5    Assessment & Plan   Palliative care by specialist  Assessment & Plan  Palliative Diagnosis: severe sepsis, CVA, GIST, CKD4, HFrEF    Goals:   Ongoing minimally invasive medical management with limit of DNR/DNI at this time. Son would have a low threshold to transition to level 4 comfort care if patient declined while awaiting hospice d/c.  Hospice consult placed and plan for d/c home on Wednesday (delivery of equipment tomorrow).   Scripts for hospice medications have been sent to [facility], areli aware.    Social Support:  Supportive listening provided  Normalized experience of patient  Provided anxiety containment  Advocated for patient/family with interdisciplinary team  Mediated conflict  Supported primarily by Austin singleton.  Pastoral care requested to return per Austin.    Care Coordination  Case discussed with hospice liaison and son at bedside    Follow-up  We appreciate the opportunity to participate in this patient's care.   We will sign off as symptoms are controlled and goals are well defined. Please notify palliative care if further needs arise while awaiting hospice discharge.  Please do not hesitate to contact our on-call provider through [employer] Secure Chat or contact [phone] should there be an acute change or other symptom control concerns.      Severe sepsis (HCC)  Assessment & Plan  Continue medical management at this time  No escalation to ICU should patient worsen     Gastrointestinal stromal tumor (GIST) (HCC)  Assessment & Plan  Defer  further work-up at this time given tentative plan to d/c home with hospice           Interval history:       No events overnight. Patient received 3 doses of dilaudid in the past 24H. Appears comfortable during time of encounter. Son at bedside and  agreeable to plan.     MEDICATIONS / ALLERGIES:     all current active meds have been reviewed    Allergies   Allergen Reactions    Fenofibrate Other (See Comments)      blood in urine  hx  Kidney Failure    Colesevelam Other (See Comments)      leg pains    Colestipol Itching and Other (See Comments)      Swelling lower legs    Ezetimibe GI Intolerance    Statins Myalgia       OBJECTIVE:    Physical Exam  Physical Exam  Constitutional:       General: She is not in acute distress.     Appearance: She is ill-appearing.   HENT:      Head: Atraumatic.   Eyes:      Conjunctiva/sclera: Conjunctivae normal.   Pulmonary:      Effort: No respiratory distress.   Abdominal:      Tenderness: There is no guarding.   Skin:     General: Skin is warm and dry.   Neurological:      Comments: Drowsy, intermittent eye opening   Psychiatric:      Comments: calm         Lab Results:   Results from last 7 days   Lab Units 08/18/24 0514 08/17/24  1147 08/16/24  0529 08/15/24  0450 08/14/24  1836   WBC Thousand/uL 25.46* 20.72* 15.81* 16.48* 20.50*   HEMOGLOBIN g/dL 7.6* 7.2* 7.3* 7.1* 8.4*   HEMATOCRIT % 27.2* 25.6* 26.9* 25.1* 30.0*   PLATELETS Thousands/uL 213 228 219 221 242   SEGS PCT %  --   --   --  88* 88*   MONO PCT % 1*  --   --  4 6   EOS PCT % 0  --   --  1 0     Results from last 7 days   Lab Units 08/18/24  0514 08/17/24  1147 08/16/24  0529 08/15/24  0450 08/14/24  1836   POTASSIUM mmol/L 3.2* 3.1* 3.3* 3.2* 3.6   CHLORIDE mmol/L 101 103 108 104 104   CO2 mmol/L 28 30 31 33* 34*   BUN mg/dL 42* 47* 48* 47* 51*   CREATININE mg/dL 1.49* 1.73* 1.79* 1.51* 1.59*   CALCIUM mg/dL 7.8* 7.7* 8.0* 7.5* 7.8*   ALK PHOS U/L  --   --   --  86 94   ALT U/L  --   --   --  9 10   AST U/L  --   --   --  13 16       Imaging Studies: reviewed pertinent studies   EKG, Pathology, and Other Studies: reviewed pertinent studies    Counseling / Coordination of Care    Total floor / unit time spent today 30 minutes. Greater than 50% of total time  was spent with the patient and / or family counseling and / or coordination of care. A description of the counseling / coordination of care: symptom assessment and management, medication review, medication adjustment, psychosocial support, chart review, imaging review, lab review, hospice services, opioid titration, supportive listening, anticipatory guidance, and coordination with hospice liaison.

## 2024-08-19 NOTE — ASSESSMENT & PLAN NOTE
Lab Results   Component Value Date    EGFR 33 08/18/2024    EGFR 28 08/17/2024    EGFR 26 08/16/2024    CREATININE 1.49 (H) 08/18/2024    CREATININE 1.73 (H) 08/17/2024    CREATININE 1.79 (H) 08/16/2024     Creatinine at baseline and continue to monitor  Nephrology consulted as below

## 2024-08-19 NOTE — PROGRESS NOTES
"[facility]  Progress Note  Name: Anamaria Parnell I  MRN: [id number]  Unit/Bed#: [facility] 726-01 I Date of Admission: 8/14/2024   Date of Service: 8/19/2024 I Hospital Day: 5    Assessment & Plan   * Goals of care, counseling/discussion  Assessment & Plan  Plan for home hospice , transportation on Wednesday 8/21  Son Austin prefer to keep lever 3 DNR/DNI for now while in the hospital including medical management as it is but no aggressive/surgical interventions   PRN Pain meds in place   Hospice and CM on board and arranging  Patient has been unable to follow command to swallow - morning meds 8/19 held    Chronic kidney disease  Assessment & Plan  Lab Results   Component Value Date    EGFR 33 08/18/2024    EGFR 28 08/17/2024    EGFR 26 08/16/2024    CREATININE 1.49 (H) 08/18/2024    CREATININE 1.73 (H) 08/17/2024    CREATININE 1.79 (H) 08/16/2024       Palliative care by specialist  Assessment & Plan  See GOC A&P     Hypernatremia  Assessment & Plan  Sodium 153 this AM, in the setting of poor PO intake/NPO  Check imaging on admission did reveal \"Small right effusion, smaller left effusion, suggest volume overload and pulmonary congestion\"  Patient currently satting well on room air  Gentle IVF with D5W  Nephrology consulted - sodium improved and Dextrose 5% decreased to 50 ml/hr    Acute metabolic encephalopathy  Assessment & Plan  Presented with SIRS and AMS, initially concerned for sepsis due to pneumonia   CT head on admission revealed, \"No acute intracranial abnormality. Chronic left PCA territory infarct, stable.\"  Patient has reported baseline memory issues and son reported to admitting provider that she has been increasingly somnolent since prior hospitalization  Delirium precautions    Severe sepsis (HCC)  Assessment & Plan  POA aeb tachycardia, leukocytosis, and elevated lactic acid level  Imaging concern for possible pneumonia  Procal elevated on admission. " "Improving  Leukocytosis improving.  Currently on IV ceftriaxone per ID recs  Blood cultures with no growth x2 after 24 hours  ID consult appreciated - recs for \"stop Ceftriaxone and monitor off antibiotics\"   Patient and family opted into hospice, accepted for home hospice, planned transportation to home 8/21 - hospice team and CM following.     Acute blood loss anemia  Assessment & Plan  During prior hospitalization noted to have melena as well as anemia requiring transfusion  Hemoglobin 7.3 today, down from 8.4 on admission. This in the setting of IVF   Appears baseline in the 8-9 range    Elevated troponin  Assessment & Plan  Suspect non-MI troponin elevation in the setting of severe sepsis    Chronic HFrEF, LVEF 30%, LVIDd 4.6 cm, AHA Stage C  Assessment & Plan  Wt Readings from Last 3 Encounters:   08/15/24 56.4 kg (124 lb 5.4 oz)   08/12/24 68 kg (150 lb)   08/09/24 68.1 kg (150 lb 2.1 oz)     Most recent echo in July 2024 with LVEF 30% which was down from echo in 2023  On PTA torsemide 10 mg daily  Although noted pulmonary venous congestion on CT imaging, patient with severe sepsis as above, received 500cc Isolyte bolus  Continue holding torsemide for now and giving IVF as below   Currently on room air          Paroxysmal atrial fibrillation (HCC)  Assessment & Plan  Continue PTA metoprolol as well as Eliquis  Patient's son prefers and asked to continue medical management including AC for now while inpatient till home hospice transportation     Diabetic ulcer of toe of right foot associated with diabetes mellitus due to underlying condition, limited to breakdown of skin (HCC)  Assessment & Plan  Status post right hallux amputation and partial first ray resection 7/29/2024.    The wound is still open and there is some necrotic tissue but no obvious signs of infection.  Pods input appreciated     CKD stage 4 secondary to hypertension (HCC)  Assessment & Plan  Lab Results   Component Value Date    EGFR 33 " 08/18/2024    EGFR 28 08/17/2024    EGFR 26 08/16/2024    CREATININE 1.49 (H) 08/18/2024    CREATININE 1.73 (H) 08/17/2024    CREATININE 1.79 (H) 08/16/2024     Creatinine at baseline and continue to monitor  Nephrology consulted as below     Aortoiliac occlusive disease (HCC)  Assessment & Plan  Recent hospitalization for bilateral femoral endarterectomy with bypass and JACI/EIA stenting as well as right first toe amputation  Was evaluated by Vascular Surgery on admission who did not feel that wound infection was present  Continue Brilinta as well as statin    Gastrointestinal stromal tumor (GIST) (HCC)  Assessment & Plan  CT imaging with evidence of GIST  Reviewed last prolonged hospitalization in which patient was noted to have melena and it does appear that this GIST was noted on CTA imaging on 7/18; does also appear from notes that the GI team who was seeing patient during last hospitalization.   Patient and son made aware, and now patient is opted into hospice care , with plan for home hospice on Wednesday. Therefore no plan for further GIST workup or treatment given comfort care only.     Hypokalemia  Assessment & Plan  Replete and monitor  Mag WNL    Type 2 diabetes mellitus with diabetic nephropathy (HCC)  Assessment & Plan  Lab Results   Component Value Date    HGBA1C 5.9 (H) 07/09/2024     Recent Labs     08/18/24  1138 08/18/24  1633 08/18/24  2053 08/19/24  0644   POCGLU 222* 214* 222* 223*       Blood Sugar Average: Last 72 hrs:  (P) [id number]    Controlled based on recent A1c, however BS high in the setting of dextrose infusions  Hold scheduled Lantus for now  Given planned hospice , no benefit of tight control or low carb diet. Check daily ~ 200's     H/o CVA  Assessment & Plan  CT head as above  On eliquis, Brilinta, and statin         VTE Pharmacologic Prophylaxis: VTE Score: 5 High Risk (Score >/= 5) - Pharmacological DVT Prophylaxis Ordered: apixaban (Eliquis). Sequential Compression  Devices Ordered.    Mobility:   Basic Mobility Inpatient Raw Score: 6  JH-HLM Goal: 2: Bed activities/Dependent transfer  JH-HLM Achieved: 2: Bed activities/Dependent transfer  JH-HLM Goal achieved. Continue to encourage appropriate mobility.    Patient Centered Rounds: I performed bedside rounds with nursing staff today.   Discussions with Specialists or Other Care Team Provider: Appreciate most recent Nephrology and Podiatry notes    Education and Discussions with Family / Patient: Attempted to update  (son) via phone. Unable to contact.    Total Time Spent on Date of Encounter in care of patient: 35 mins. This time was spent on one or more of the following: performing physical exam; counseling and coordination of care; obtaining or reviewing history; documenting in the medical record; reviewing/ordering tests, medications or procedures; communicating with other healthcare professionals and discussing with patient's family/caregivers.    Current Length of Stay: 5 day(s)  Current Patient Status: Inpatient   Certification Statement: The patient will continue to require additional inpatient hospital stay due to discharge planning/hospice arrangements  Discharge Plan: Anticipate discharge in 24-48 hrs to home.    Code Status: Level 3 - DNAR and DNI    Subjective:   Patient in bed, unable to swallow pills this AM. Some bleeding of RUE s/p blood draw. Re-bandaged with nurse at bedside    Objective:     Vitals:   Temp (24hrs), Av.5 °F (36.4 °C), Min:97.2 °F (36.2 °C), Max:97.7 °F (36.5 °C)    Temp:  [97.2 °F (36.2 °C)-97.7 °F (36.5 °C)] 97.7 °F (36.5 °C)  HR:  [84-92] 92  Resp:  [16] 16  BP: (118-133)/(60-68) 118/60  SpO2:  [98 %-100 %] 98 %  Body mass index is 25.11 kg/m².     Input and Output Summary (last 24 hours):   No intake or output data in the 24 hours ending 24 1053    Physical Exam:   Physical Exam  Vitals and nursing note reviewed.   Constitutional:       General: She is sleeping. She  is not in acute distress.     Appearance: Normal appearance. She is well-developed.   HENT:      Head: Normocephalic and atraumatic.   Eyes:      General: No scleral icterus.     Conjunctiva/sclera: Conjunctivae normal.   Cardiovascular:      Rate and Rhythm: Normal rate and regular rhythm.      Heart sounds: No murmur heard.  Pulmonary:      Effort: Pulmonary effort is normal.      Breath sounds: No wheezing, rhonchi or rales.   Abdominal:      General: There is no distension.      Palpations: Abdomen is soft.   Skin:     General: Skin is warm and dry.      Comments: Bleeding area of RUE   Neurological:      Mental Status: She is easily aroused. Mental status is at baseline.   Psychiatric:         Mood and Affect: Mood normal.         Cognition and Memory: Cognition is impaired. Memory is impaired.          Additional Data:     Labs:  Results from last 7 days   Lab Units 08/18/24  0514 08/16/24  0529 08/15/24  0450   WBC Thousand/uL 25.46*   < > 16.48*   HEMOGLOBIN g/dL 7.6*   < > 7.1*   HEMATOCRIT % 27.2*   < > 25.1*   PLATELETS Thousands/uL 213   < > 221   SEGS PCT %  --   --  88*   LYMPHO PCT % 1*  --  6*   MONO PCT % 1*  --  4   EOS PCT % 0  --  1    < > = values in this interval not displayed.     Results from last 7 days   Lab Units 08/18/24  0514 08/16/24  0529 08/15/24  0450   SODIUM mmol/L 142   < > 148*   POTASSIUM mmol/L 3.2*   < > 3.2*   CHLORIDE mmol/L 101   < > 104   CO2 mmol/L 28   < > 33*   BUN mg/dL 42*   < > 47*   CREATININE mg/dL 1.49*   < > 1.51*   ANION GAP mmol/L 13   < > 11   CALCIUM mg/dL 7.8*   < > 7.5*   ALBUMIN g/dL  --   --  2.6*   TOTAL BILIRUBIN mg/dL  --   --  1.65*   ALK PHOS U/L  --   --  86   ALT U/L  --   --  9   AST U/L  --   --  13   GLUCOSE RANDOM mg/dL 233*   < > 151*    < > = values in this interval not displayed.     Results from last 7 days   Lab Units 08/15/24  0450   INR  2.21*     Results from last 7 days   Lab Units 08/19/24  0644 08/18/24  4743 08/18/24  6820  08/18/24  1138 08/18/24  0731 08/18/24  0652 08/17/24  1704 08/17/24  1157 08/17/24  0752 08/16/24  2224 08/16/24  1551 08/16/24  1205   POC GLUCOSE mg/dl 223* 222* 214* 222* 202* 234* 258* 243* 253* 236* 232* 231*         Results from last 7 days   Lab Units 08/17/24  1147 08/16/24  0529 08/14/24  2116 08/14/24  1836   LACTIC ACID mmol/L  --   --  2.1* 2.1*   PROCALCITONIN ng/ml 0.59* 0.43*  --  0.52*       Lines/Drains:  Invasive Devices       Peripheral Intravenous Line  Duration             Peripheral IV 08/19/24 Left Antecubital <1 day                          Imaging: No pertinent imaging reviewed.    Recent Cultures (last 7 days):   Results from last 7 days   Lab Units 08/14/24  1930   BLOOD CULTURE  No Growth After 4 Days.  No Growth After 4 Days.       Last 24 Hours Medication List:   Current Facility-Administered Medications   Medication Dose Route Frequency Provider Last Rate    acetaminophen  650 mg Oral Q6H PRN Philip Lopez, DO      allopurinol  100 mg Oral Daily Philip Lopez, DO      apixaban  5 mg Oral BID Philip Lopez, DO      dextrose  50 mL/hr Intravenous Continuous Gilberto Haley MD 50 mL/hr (08/18/24 1934)    escitalopram  20 mg Oral Daily Philip Lopez, DO      HYDROmorphone  0.2 mg Intravenous Q4H PRN Willa Venegas PA-C      insulin lispro  1-5 Units Subcutaneous TID AC Silver Barron PA-C      lidocaine  1 patch Topical Daily Ben Aiad, DO      metoprolol succinate  25 mg Oral Daily Philip Lopez, DO      pantoprazole  40 mg Oral BID Philip Lopez, DO      pravastatin  80 mg Oral Daily With Dinner Philip Lopez, DO      ticagrelor  90 mg Oral Q12H JAVIER Philip Lopez, DO          Today, Patient Was Seen By: Bonnie Medina PA-C    **Please Note: This note may have been constructed using a voice recognition system.**

## 2024-08-19 NOTE — ASSESSMENT & PLAN NOTE
Lab Results   Component Value Date    EGFR 33 08/18/2024    EGFR 28 08/17/2024    EGFR 26 08/16/2024    CREATININE 1.49 (H) 08/18/2024    CREATININE 1.73 (H) 08/17/2024    CREATININE 1.79 (H) 08/16/2024

## 2024-08-19 NOTE — PROGRESS NOTES
Pastoral Care Progress Note    2024  Patient: Anamaria Parnell : 1945  Admission Date & Time: 2024 1735  MRN: [id number] [facility]: [id number]           24 0952   Clinical Encounter Type   Visited With Patient and family together  (Son present)   Rastafari Encounters   Rastafari Needs Prayer  (Fr Chisholm provided blessing and anointing)

## 2024-08-19 NOTE — PROGRESS NOTES
Progress Note - Infectious Disease   Anamaria Parnell 79 y.o. female MRN: [id number]  Unit/Bed#: [facility] 726-01 Encounter: [id number]      Impression/Plan:    1.  SIRS.  Patient with leukocytosis, mild lactic acidosis, tachycardia.  However white blood cell count elevated during prior admission and remained elevated at time of discharge.  She is afebrile, hemodynamically stable. CTA C/A/P showed multifocal areas of groundglass density in both lungs possibly due to pneumonia, postoperative changes from the recent bifemoral bypass graft with fluid around the anastomotic site in the left inguinal region due to seroma, exophytic mass from the stomach compatible with gastrointestinal stromal tumor.  UA unremarkable. Higher concern for aspiration pneumonitis rather than pneumonia as she has minimal respiration symptoms, was quickly weaned to room air. Procalcitonin is mildly elevated but not particularly reliable in the setting of CKD. Leukocytosis and SIRS likely also related to recent surgeries, open foot and groin wounds, aspiration              -monitor off antibiotics               -Monitor fever curve, respiratory symptoms     2.  Acute encephalopathy.  CT head without acute abnormality, shows chronic left PCA territory infarct.  May be multifactorial related to poor p.o. intake, delirium from recent prolonged hospitalization, hypernatremia, aspiration.              -Supportive care per primary team     3.  Right hallux dry gangrene.  In setting of severe PAD with chronic limb ischemia.  Status post right hallux amputation and partial first ray resection 7/29/2024.  Tissue culture grew Morganella and the patient completed 7 days of postoperative antibiotics.  The wound is still open and there is some necrotic tissue but no obvious signs of infection.              -Continue local wound care     4.  PAD. Status post bilateral femoral endarterectomy with left to right femorofemoral PTFE bypass and retrograde left JACI, left  EIA stenting, and bilateral groin VAC placement on 2024. CTA shows postoperative changes from the recent bifemoral bypass graft with fluid around the anastomotic site in the left inguinal region due to seroma.  Per vascular surgery low concern for active infection.  There is slough and fat necrosis in the right groin wound which does not appear acutely infected.  Per discussion with vascular the patient and family do not desire further intervention and plan is now for transition to hospice.     5.  Type 2 diabetes mellitus.  Relatively well-controlled.  Continue glycemic control per primary team.     6.  CKD 3.  Baseline creatinine 1.3-1.8.  Creatinine is currently at baseline.     7.  Gastrointestinal stromal tumor. No further work up due to transition to hospice     I have discussed the above management plan to monitor off antibiotics with the primary team AP.  ID will sign off at this time, please call with questions.    Antibiotics:  Off antibiotics    Subjective:  The patient is resting comfortably although arousable to voice.  She remains confused.  Denies any shortness of breath or pain.  Plan for discharge home Wednesday on hospice.    Objective:  Vitals:  Temp:  [97.2 °F (36.2 °C)-97.7 °F (36.5 °C)] 97.2 °F (36.2 °C)  HR:  [84-92] 89  Resp:  [16] 16  BP: (113-119)/(60-68) 113/60  SpO2:  [97 %-100 %] 97 %  Temp (24hrs), Av.5 °F (36.4 °C), Min:97.2 °F (36.2 °C), Max:97.7 °F (36.5 °C)  Current: Temperature: (!) 97.2 °F (36.2 °C)    Physical Exam:   General Appearance:  Frail-appearing, lethargic   Throat: Oropharynx moist without lesions.    Lungs:   Clear to auscultation bilaterally; no wheezes, rhonchi or rales; respirations unlabored   Heart:  RRR; no murmur, rub or gallop   Abdomen:   Soft, non-tender, non-distended, positive bowel sounds.     Extremities: Right groin wound with packing in place, no surrounding cellulitis.  Right foot wrapped in Ace bandage   Skin: No new rashes or lesions.         Labs:   All pertinent labs and imaging studies were personally reviewed  Results from last 7 days   Lab Units 08/18/24  0514 08/17/24  1147 08/16/24  0529   WBC Thousand/uL 25.46* 20.72* 15.81*   HEMOGLOBIN g/dL 7.6* 7.2* 7.3*   PLATELETS Thousands/uL 213 228 219     Results from last 7 days   Lab Units 08/18/24  0514 08/17/24  1147 08/16/24  0529 08/15/24  0450 08/14/24  1836   SODIUM mmol/L 142 145 153* 148* 147   POTASSIUM mmol/L 3.2* 3.1* 3.3* 3.2* 3.6   CHLORIDE mmol/L 101 103 108 104 104   CO2 mmol/L 28 30 31 33* 34*   BUN mg/dL 42* 47* 48* 47* 51*   CREATININE mg/dL 1.49* 1.73* 1.79* 1.51* 1.59*   EGFR ml/min/1.73sq m 33 28 26 33 31   CALCIUM mg/dL 7.8* 7.7* 8.0* 7.5* 7.8*   AST U/L  --   --   --  13 16   ALT U/L  --   --   --  9 10   ALK PHOS U/L  --   --   --  86 94     Results from last 7 days   Lab Units 08/17/24  1147 08/16/24  0529 08/14/24  1836   PROCALCITONIN ng/ml 0.59* 0.43* 0.52*                   Micro:  Results from last 7 days   Lab Units 08/14/24  2345 08/14/24  1930   BLOOD CULTURE   --  No Growth After 4 Days.  No Growth After 4 Days.   MRSA CULTURE ONLY  No Methicillin Resistant Staphlyococcus aureus (MRSA) isolated  --        Imaging:  I have personally reviewed pertinent imaging study reports and images in PACS.  CTA C/A/P-multifocal areas of groundglass density in both lungs, small pleural effusions, expected postoperative changes from recent bifemoral bypass graft with seroma around the anastomotic site in the left inguinal region

## 2024-08-19 NOTE — ASSESSMENT & PLAN NOTE
Palliative Diagnosis: severe sepsis, CVA, GIST, CKD4, HFrEF    Goals:   Ongoing minimally invasive medical management with limit of DNR/DNI at this time. Son would have a low threshold to transition to level 4 comfort care if patient declined while awaiting hospice d/c.  Hospice consult placed and plan for d/c home on Wednesday (delivery of equipment tomorrow).   Scripts for hospice medications have been sent to [facility].    Social Support:  Supportive listening provided  Normalized experience of patient  Provided anxiety containment  Advocated for patient/family with interdisciplinary team  Mediated conflict  Supported primarily by Austin singleton.  Pastoral care requested to return per Austin.    Care Coordination  Case discussed with hospice liaison and son at bedside    Follow-up  We appreciate the opportunity to participate in this patient's care.   We will sign off as symptoms are controlled and goals are well defined. Please notify palliative care if further needs arise while awaiting hospice discharge.  Please do not hesitate to contact our on-call provider through [employer] Secure Chat or contact [phone] should there be an acute change or other symptom control concerns.

## 2024-08-19 NOTE — ASSESSMENT & PLAN NOTE
Plan for home hospice , transportation on Wednesday 8/21  Kris Avila prefer to keep lever 3 DNR/DNI for now while in the hospital including medical management as it is but no aggressive/surgical interventions   PRN Pain meds in place   Hospice and CM on board and arranging  Patient has been unable to follow command to swallow - morning meds 8/19 held

## 2024-08-19 NOTE — PLAN OF CARE
Problem: Potential for Falls  Goal: Patient will remain free of falls  Description: INTERVENTIONS:  - Educate patient/family on patient safety including physical limitations  - Instruct patient to call for assistance with activity   - Consult OT/PT to assist with strengthening/mobility   - Keep Call bell within reach  - Keep bed low and locked with side rails adjusted as appropriate  - Keep care items and personal belongings within reach  - Initiate and maintain comfort rounds  - Make Fall Risk Sign visible to staff  - Offer Toileting every 2 Hours, in advance of need  - Initiate/Maintain bedalarm  - Obtain necessary fall risk management equipment: call bell  - Apply yellow socks and bracelet for high fall risk patients  - Consider moving patient to room near nurses station  Outcome: Progressing     Problem: SAFETY ADULT  Goal: Patient will remain free of falls  Description: INTERVENTIONS:  - Educate patient/family on patient safety including physical limitations  - Instruct patient to call for assistance with activity   - Consult OT/PT to assist with strengthening/mobility   - Keep Call bell within reach  - Keep bed low and locked with side rails adjusted as appropriate  - Keep care items and personal belongings within reach  - Initiate and maintain comfort rounds  - Make Fall Risk Sign visible to staff  - Offer Toileting every 2 Hours, in advance of need  - Initiate/Maintain bedalarm  - Obtain necessary fall risk management equipment: call bell  - Apply yellow socks and bracelet for high fall risk patients  - Consider moving patient to room near nurses station  Outcome: Progressing     Problem: Knowledge Deficit  Goal: Patient/family/caregiver demonstrates understanding of disease process, treatment plan, medications, and discharge instructions  Description: Complete learning assessment and assess knowledge base.  Interventions:  - Provide teaching at level of understanding  - Provide teaching via preferred  learning methods  Outcome: Progressing

## 2024-08-19 NOTE — PROGRESS NOTES
Pastoral Care Progress Note    2024  Patient: Anamaria Parnell : 1945  Admission Date & Time: 2024 1735  MRN: [id number] [facility]: [id number]             24 1200   Clinical Encounter Type   Visited With Patient and family together  (Son present)   Referral From Nurse   Referral To    Christianity Encounters   Christianity Needs Prayer  ( provided spiritual and emotional support through facilitating story telling of pts recent medical history, grief support, encouragement to do self care, and prayer.)

## 2024-08-19 NOTE — ASSESSMENT & PLAN NOTE
Lab Results   Component Value Date    HGBA1C 5.9 (H) 07/09/2024     Recent Labs     08/18/24  1138 08/18/24  1633 08/18/24 2053 08/19/24  0644   POCGLU 222* 214* 222* 223*       Blood Sugar Average: Last 72 hrs:  (P) [id number]    Controlled based on recent A1c, however BS high in the setting of dextrose infusions  Hold scheduled Lantus for now  Given planned hospice , no benefit of tight control or low carb diet. Check daily ~ 200's

## 2024-08-20 NOTE — ASSESSMENT & PLAN NOTE
"Presented with SIRS and AMS, initially concerned for sepsis due to pneumonia   CT head on admission revealed, \"No acute intracranial abnormality. Chronic left PCA territory infarct, stable.\"  Patient has reported baseline memory issues and son reported to admitting provider that she has been increasingly somnolent since prior hospitalization  "

## 2024-08-20 NOTE — ASSESSMENT & PLAN NOTE
Lab Results   Component Value Date    HGBA1C 5.9 (H) 07/09/2024     Recent Labs     08/19/24  0644 08/19/24  1123 08/19/24  1608 08/19/24  2101   POCGLU 223* 250* 215* 198*       Blood Sugar Average: Last 72 hrs:  (P) [id number]    Controlled based on recent A1c, however BS high in the setting of dextrose infusions  Hold scheduled Lantus for now  Given planned hospice , no benefit of tight control or low carb diet. Check daily ~ 200's

## 2024-08-20 NOTE — DEATH NOTE
INPATIENT DEATH NOTE  Anamaria Parnell 79 y.o. female MRN: [id number]  Unit/Bed#: [facility] 726-01 Encounter: [id number]    Date, Time and Cause of Death    Date of Death: 24  Time of Death: 12:42 AM  Preliminary Cause of Death: Pneumonia  Entered by: Reshma Rodríguez PA-C[AF1.1]       Attribution       AF1.1 Reshma Rodríguez PA-C 24 01:08             Patient's Information  Pronounced by: Reshma Rodríguez PA-C  Did the patient's death occur in the ED?: No  Did the patient's death occur in the OR?: No  Did the patient's death occur less than 10 days post-op?: No  Did the patient's death occur within 24 hours of admission?: No  Was code status DNR at the time of death?: Yes    PHYSICAL EXAM:  Unresponsive to noxious stimuli, Spontaneous respirations absent, Breath sounds absent, Carotid pulse absent, Heart sounds absent, Pupillary light reflex absent, and Corneal blink reflex absent    Medical Examiner notification criteria:  NONE APPLICABLE   Medical Examiner's office notified?:  No, does not meet ME notification criteria   Medical Examiner accepted case?:  No  Name of Medical Examiner: n/a    Family Notification  Was the family notified?: Yes  Date Notified: 24  Time Notified: 100  Notified by: Reshma Rodríguez PA-C  Name of Family Notified of Death: Austin Parnell   Relationship to Patient: Son  Family Notification Route: Telephone  Was the family told to contact a  home?: Yes  Name of  Home:: To be decided    Autopsy Options:  Post-mortem examination declined by next of kin    Primary Service Attending Physician notified?:  yes - Attending:  Robert Griggs MD    Physician/Resident responsible for completing Discharge Summary:  Reshma Rodríguez PA-C

## 2024-08-20 NOTE — ASSESSMENT & PLAN NOTE
Sodium 153 this AM, in the setting of poor PO intake/NPO  Nephrology consulted - sodium improved and Dextrose 5% decreased to 50 ml/hr

## 2024-08-20 NOTE — NURSING NOTE
Patient required frequent switching of masimo sensor between her earlobe and fingertip throughout the night related to the sensor being unable to read patient's pressure index.  When RN arrived in patient room to change the sensor, patient was unarousable, unresponsive and appeared to have absent pulse and breath sounds.  SLIM notified and arrived bedside.        -Dee Dee Grady RN

## 2024-08-20 NOTE — ASSESSMENT & PLAN NOTE
POA aeb tachycardia, leukocytosis, and elevated lactic acid level  Imaging concern for possible pneumonia, vs aspiration pneumonitis per ID  Was on IV ceftriaxone now stopped per ID recs  Blood cultures NGTD  Patient and family opted into hospice, accepted for home hospice, planned transportation to home 8/21 - hospice team and CM following.

## 2024-08-20 NOTE — DISCHARGE SUMMARY
[facility]  Discharge- Anamaria Parnell 1945, 79 y.o. female MRN: [id number]  Unit/Bed#: [facility]PHP 726-01 Encounter: [id number]  Primary Care Provider: Ritchie Lawton MD   Date and time admitted to hospital: 8/14/2024  5:35 PM    * Severe sepsis (HCC)  Assessment & Plan  POA aeb tachycardia, leukocytosis, and elevated lactic acid level  Imaging concern for possible pneumonia, vs aspiration pneumonitis per ID  Was on IV ceftriaxone now stopped per ID recs  Blood cultures NGTD  Patient and family opted into hospice, accepted for home hospice, planned transportation to home 8/21 - hospice team and CM following.     Goals of care, counseling/discussion  Assessment & Plan  Plan for home hospice , transportation on Wednesday 8/21  Son Austin prefer to keep lever 3 DNR/DNI for now while in the hospital including medical management as it is but no aggressive/surgical interventions   PRN Pain meds in place   Hospice and CM on board and arranging  Patient has been unable to follow command to swallow - morning meds 8/19 held    Gastrointestinal stromal tumor (GIST) (HCC)  Assessment & Plan  CT imaging with evidence of GIST  Reviewed last prolonged hospitalization in which patient was noted to have melena and it does appear that this GIST was noted on CTA imaging on 7/18; does also appear from notes that the GI team who was seeing patient during last hospitalization.   Patient and son made aware, and now patient is opted into hospice care , with plan for home hospice on Wednesday. Therefore no plan for further GIST workup or treatment given comfort care only.     Diabetic ulcer of toe of right foot associated with diabetes mellitus due to underlying condition, limited to breakdown of skin (HCC)  Assessment & Plan  Status post right hallux amputation and partial first ray resection 7/29/2024.    The wound is still open and there is some necrotic tissue but no obvious signs of  "infection.  Pods input appreciated     Chronic HFrEF, LVEF 30%, LVIDd 4.6 cm, AHA Stage C  Assessment & Plan  Wt Readings from Last 3 Encounters:   08/15/24 56.4 kg (124 lb 5.4 oz)   08/12/24 68 kg (150 lb)   08/09/24 68.1 kg (150 lb 2.1 oz)     Most recent echo in July 2024 with LVEF 30% which was down from echo in 2023  On PTA torsemide 10 mg daily  Although noted pulmonary venous congestion on CT imaging, patient with severe sepsis as above, received 500cc Isolyte bolus  Continue holding torsemide for now and giving IVF as below   Currently on room air    Acute metabolic encephalopathy  Assessment & Plan  Presented with SIRS and AMS, initially concerned for sepsis due to pneumonia   CT head on admission revealed, \"No acute intracranial abnormality. Chronic left PCA territory infarct, stable.\"  Patient has reported baseline memory issues and son reported to admitting provider that she has been increasingly somnolent since prior hospitalization    Aortoiliac occlusive disease (HCC)  Assessment & Plan  Recent hospitalization for bilateral femoral endarterectomy with bypass and JACI/EIA stenting as well as right first toe amputation  Was evaluated by Vascular Surgery on admission who did not feel that wound infection was present  Continue Brilinta as well as statin    Type 2 diabetes mellitus with diabetic nephropathy (HCC)  Assessment & Plan  Lab Results   Component Value Date    HGBA1C 5.9 (H) 07/09/2024     Recent Labs     08/19/24  0644 08/19/24  1123 08/19/24  1608 08/19/24  2101   POCGLU 223* 250* 215* 198*       Blood Sugar Average: Last 72 hrs:  (P) [id number]    Controlled based on recent A1c, however BS high in the setting of dextrose infusions  Hold scheduled Lantus for now  Given planned hospice , no benefit of tight control or low carb diet. Check daily ~ 200's     CKD stage 4 secondary to hypertension (HCC)  Assessment & Plan  Lab Results   Component Value Date    EGFR 33 08/18/2024    EGFR 28 " "08/17/2024    EGFR 26 08/16/2024    CREATININE 1.49 (H) 08/18/2024    CREATININE 1.73 (H) 08/17/2024    CREATININE 1.79 (H) 08/16/2024     Creatinine at baseline and continue to monitor  Nephrology consulted as below     Paroxysmal atrial fibrillation (HCC)  Assessment & Plan  Continue PTA metoprolol as well as Eliquis  Patient's son prefers and asked to continue medical management including AC for now while inpatient till home hospice transportation     Hypernatremia  Assessment & Plan  Sodium 153 this AM, in the setting of poor PO intake/NPO  Nephrology consulted - sodium improved and Dextrose 5% decreased to 50 ml/hr    Acute blood loss anemia  Assessment & Plan  During prior hospitalization noted to have melena as well as anemia requiring transfusion  Hemoglobin 7.3 today, down from 8.4 on admission. This in the setting of IVF   Appears baseline in the 8-9 range    H/o CVA  Assessment & Plan  CT head as above  On eliquis, Brilinta, and statin          Medical Problems       Resolved Problems  Date Reviewed: 8/20/2024   None         Admission Date:   Admission Orders (From admission, onward)       Ordered        08/14/24 2149  INPATIENT ADMISSION  Once                            Admitting Diagnosis: Leg pain [M79.606]  Pneumonia [J18.9]  Mass of stomach [K31.89]  Aortoiliac occlusive disease (HCC) [I74.09]  Claudication in peripheral vascular disease (HCC) [I73.9]  Wound drainage [T14.8XXA]  Severe sepsis (HCC) [A41.9, R65.20]  Acute metabolic encephalopathy [G93.41]    HPI: From H+P by Philip Lopez 8/15/2024: \"Anamaria Parnell is a 79 y.o. female who has past medical history significant for recent prolonged hospitalization from 7/19 to 8/9 in which patient was found to have a gangrenous right first toe for which she underwent bilateral femoral endarterectomy with bypass and JACI/EIA stenting as well as amputation of the right first toe, heart failure with reduced ejection fraction, severe anemia requiring " "transfusion, GI bleed, CVA, advanced CKD, A-fib, diabetes who presented to [facility] ER on the evening of  with symptoms of increasing fatigue as well as lethargy over the last several days since recent hospitalization.  He reports that patient has been more fatigued and has been eating less as well as appears not to swallow when she puts food in her mouth.  I additionally spoke at length with patient's son as well as son significant other at bedside regarding my concerns with patient's clinical condition in the setting of her underlying comorbidities.  Son does report that patient is DNR/DNI.  He also understands that his mother is very ill.  With that said he would like to continue medical treatment for now.  He does though also report he would like to continue having ongoing discussion regarding his mother and goals of care throughout hospitalization as he also does not want his mother to suffer.  He reports that he would want to meet with the palliative team to continue these discussions.\"     Procedures Performed:   Orders Placed This Encounter   Procedures    ED ECG Documentation Only       Summary of Hospital Course: She was seen by ID and was initially on ceftriaxone however this was discontinued given high suspicion for aspiration pneumonitis rather than pneumonia. Palliative care was consulted given her multiple comorbid conditions, declining health and poor functional status. Her son opted for home hospice and plan was to discharge her on  however she  in the hospital on .    Significant Findings, Care, Treatment and Services Provided: ID, nephrology, podiatry, palliative care consults    Complications: none    Final Diagnosis: pneumonia    Date, Time and Cause of Death    Date of Death: 24  Time of Death: 12:42 AM  Preliminary Cause of Death: Pneumonia  Entered by: Reshma Rodríguez PA-C[AF1.1]       Attribution       AF1.1 Reshma Rodríguez PA-C 24 01:08      "       PCP: Ritchie Lawton MD    Disposition:

## 2024-08-20 NOTE — PLAN OF CARE
Problem: INFECTION - ADULT  Goal: Absence or prevention of progression during hospitalization  Description: INTERVENTIONS:  - Assess and monitor for signs and symptoms of infection  - Monitor lab/diagnostic results  - Monitor all insertion sites, i.e. indwelling lines, tubes, and drains  - Monitor endotracheal if appropriate and nasal secretions for changes in amount and color  - [place] appropriate cooling/warming therapies per order  - Administer medications as ordered  - Instruct and encourage patient and family to use good hand hygiene technique  - Identify and instruct in appropriate isolation precautions for identified infection/condition  Outcome: Progressing     Problem: INFECTION - ADULT  Goal: Absence of fever/infection during neutropenic period  Description: INTERVENTIONS:  - Monitor WBC    Outcome: Progressing     Problem: Knowledge Deficit  Goal: Patient/family/caregiver demonstrates understanding of disease process, treatment plan, medications, and discharge instructions  Description: Complete learning assessment and assess knowledge base.  Interventions:  - Provide teaching at level of understanding  - Provide teaching via preferred learning methods  Outcome: Progressing     Problem: DISCHARGE PLANNING  Goal: Discharge to home or other facility with appropriate resources  Description: INTERVENTIONS:  - Identify barriers to discharge w/patient and caregiver  - Arrange for needed discharge resources and transportation as appropriate  - Identify discharge learning needs (meds, wound care, etc.)  - Arrange for interpretive services to assist at discharge as needed  - Refer to Case Management Department for coordinating discharge planning if the patient needs post-hospital services based on physician/advanced practitioner order or complex needs related to functional status, cognitive ability, or social support system  Outcome: Progressing

## 2024-08-21 NOTE — UTILIZATION REVIEW
NOTIFICATION OF ADMISSION DISCHARGE   This is a Notification of Discharge from [facility]. Please be advised that this patient has been discharge from our facility. Below you will find the admission and discharge date and time including the patient’s disposition.   UTILIZATION REVIEW CONTACT:  Avelino Lamas  Utilization   Network Utilization Review Department  Phone: [phone] x carefully listen to the prompts. All voicemails are confidential.  Email: [email or web address]     ADMISSION INFORMATION  PRESENTATION DATE: 2024  5:35 PM  OBERVATION ADMISSION DATE: N/A  INPATIENT ADMISSION DATE: 24  9:49 PM   DISCHARGE DATE: 2024  5:43 AM   DISPOSITION:    Network Utilization Review Department  ATTENTION: Please call with any questions or concerns to [phone] and carefully listen to the prompts so that you are directed to the right person. All voicemails are confidential.   For Discharge needs, contact Care Management DC Support Team at [phone] opt. 2  Send all requests for admission clinical reviews, approved or denied determinations and any other requests to dedicated fax number below belonging to the campus where the patient is receiving treatment. List of dedicated fax numbers for the Facilities:  FACILITY NAME UR FAX NUMBER   ADMISSION DENIALS (Administrative/Medical Necessity) [phone]   DISCHARGE SUPPORT TEAM ([facility]) [phone]   PARENT CHILD HEALTH (Maternity/NICU/Pediatrics) [phone]   [facility] [phone]   [facility] [phone]   [facility] [phone]   [facility] [phone]   [facility] [phone]   [facility] [phone]   General acute hospital [phone]   [facility] [phone]   [facility]  [facility] [phone]   [facility] [phone]   [facility] [phone]   [facility] [phone]

## 2024-09-05 NOTE — ANESTHESIA POSTPROCEDURE EVALUATION
Post-Op Assessment Note                Reason for prolonged intubation > 24 hours:  Did not meet  criteria for extubation        BP      Temp      Pulse     Resp      SpO2

## 2024-11-13 NOTE — ANESTHESIA PREPROCEDURE EVALUATION
Procedure:  Rigth Hallux amputation, possible partial 1st ray resection (Right: Foot)    Relevant Problems   CARDIO   (+) Aortoiliac occlusive disease (HCC)   (+) Basilar artery stenosis   (+) Coronary artery disease of native artery of native heart with stable angina pectoris (HCC)   (+) Hypercholesteremia   (+) Hyperlipidemia associated with type 2 diabetes mellitus  (HCC)   (+) Hyperlipidemia, unspecified   (+) Hypertension   (+) Hypertension associated with diabetes  (HCC)   (+) Paroxysmal atrial fibrillation (HCC)   (+) Primary hypertension   (+) Pulmonary hypertension (HCC) (Severe PASP 60 )   (+) Right renal artery stenosis (HCC)   (+) Type 2 diabetes mellitus with diabetic peripheral angiopathy without gangrene (HCC)   (+) Vertebrobasilar artery syndrome      ENDO   (+) Secondary hyperparathyroidism of renal origin (HCC)   (+) Type 2 diabetes mellitus with diabetic nephropathy (HCC)   (+) Type 2 diabetes mellitus with diabetic peripheral angiopathy without gangrene (HCC)   (+) Type 2 diabetes mellitus, without long-term current use of insulin (HCC)      GI/HEPATIC   (+) Chronic GERD      /RENAL   (+) Acute renal failure superimposed on stage 3 chronic kidney disease (HCC)   (+) Acute renal failure superimposed on stage 4 chronic kidney disease (HCC)   (+) Complex renal cyst   (+) Hypertensive kidney disease with stage 4 chronic kidney disease (HCC)   (+) Renal cyst   (+) Stage 3b chronic kidney disease (HCC)      MUSCULOSKELETAL   (+) Gout   (+) Lumbosacral spondylosis without myelopathy   (+) Osteoarthritis   (+) Spondylosis of lumbar region without myelopathy or radiculopathy      NEURO/PSYCH   (+) CVA (cerebral vascular accident) (HCC)   (+) Claudication in peripheral vascular disease (HCC)   (+) Depression, recurrent (HCC)      PULMONARY   (+) Smoking      Neurology/Sleep   (+) Hx-TIA (transient ischemic attack)      Orthopedic/Musculoskeletal   (+) Cellulitis of toe of right foot       Cardiovascular/Peripheral Vascular   (+) Chronic HFrEF (heart failure with reduced ejection fraction) (HCC)      Other   (+) Aortoiliac stenosis, left (HCC)   (+) Gangrene (HCC)   (+) Transaminitis      7/21/24 Echo:    Left Ventricle: Left ventricular cavity size is normal. Wall thickness is severely increased. The left ventricular ejection fraction is 41% by biplane measurement. Systolic function is mildly reduced. Global longitudinal strain is reduced at -10% with moderate to severe strain reduction in the ED mid to basal anteroseptal septal and inferior walls.. Unable to grade diastolic dysfunction given the severe degree of mitral annular calcification.    The following segments are hypokinetic: basal inferoseptal, mid inferoseptal, apical septal, apical inferior, apical lateral and apex.    All other segments are normal.    IVS: There is abnormal septal motion consistent with left bundle branch block.    Left Atrium: The atrium is severely dilated (>48 mL/m2).    Atrial Septum: There is a possible small patent foramen ovale seen with color Doppler.    Mitral Valve: There is severe annular calcification. There is moderate regurgitation.    Tricuspid Valve: There is mild regurgitation. The right ventricular systolic pressure is moderately elevated. The estimated right ventricular systolic pressure is 59.00 mmHg.    Aorta: The aortic root is mildly dilated. The ascending aorta is mildly dilated. The aortic root is 3.40 cm. The ascending aorta is 3.7 cm.    IVC/SVC: The right atrial pressure is estimated at 15.0 mmHg. The inferior vena cava is dilated. Respirophasic changes were blunted (less than 50% variation).    Pericardium: There is a trivial pericardial effusion anterior to the heart.  Physical Exam    Airway    Mallampati score: II  TM Distance: <3 FB       Dental        Cardiovascular  Rhythm: regular, Rate: normal    Pulmonary   Decreased breath sounds    Other Findings  post-pubertal.      Anesthesia  Plan  ASA Score- 4 Emergent    Anesthesia Type- general with ASA Monitors.         Additional Monitors:     Airway Plan: LMA.    Comment:  DNR DNI suspended for procedure.     VASTLY improved mental state from this AM. Patient denies eating any breakfast, sips with meds as only PO intake. Consent from son and from patient.     Confirmed patient and son OK with transfusion if needed. .       Plan Factors-Exercise tolerance (METS): <4 METS.            Patient is not a current smoker.      Obstructive sleep apnea risk education given perioperatively.        Induction- intravenous.    Postoperative Plan- Plan for postoperative opioid use.     Perioperative Resuscitation Plan - Level 1 - Full Code.       Informed Consent- Anesthetic plan and risks discussed with patient and son.           Chronic moderate malnutrition

## 2024-12-12 NOTE — PROGRESS NOTES
Mom called the pharmacy. They still have not gotten [facility] approval. The insurance is wanting generic but there is no generic. Mom wanting to know if PA can be submitted again. He has been on the medication for a year. Call mom and let her know   NEPHROLOGY PROGRESS NOTE   Anamaria Parnell 79 y.o. 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  Nephrology consulted for management of RICARDO on CKD.    ASSESSMENT/PLAN:  RICARDO (POA) on CKD IIIB/IV:   -Etiology: Suspect in the setting of sepsis  -Admission creatinine 2.19 on 7/16/2024.  Today's creatinine 1.59, stable but slight bump from preop  -Peak creatinine 2.19 on 7/16/2024  -baseline creatinine 1.4-1.7  -workup: UA with 1+ protein, 2-4 RBCs, 20-30 WBCs.  UACR 1 g  -Imaging: CTA with moderate right renal artery stenosis,  left renal artery with left renal atrophy with no hydronephrosis  -nonoliguric  -Renal function stable and remains at baseline with slight bump in creatinine from preop.  -Clinically, no acute indication for renal replacement therapy (dialysis)  -consider dose of IV lasix  -Continue to hold losartan today  -Strict I/Os, daily weights  -Avoid nephrotoxins, NSAIDs, IV contrast if possible  -Avoid hypotension.  Maintain MAP >65  -Trend BMP  -Adjust meds to appropriate GFR  -Optimize hemodynamic status to avoid delay in renal recovery  -Will d/w Dr. Haley  -d/w critical care team who agrees to follow volume status.    Chronic kidney disease IIIB/IV:    -Baseline creatinine 1.4-1.7  -Outpatient Nephrologist: Dr. NAYE Longoria    Aortoiliac/PAD with R hallux tissue loss/gangrene:  -s/p bilateral CFEA w/patch angioplasty, L JACI/EIA shockwave PTA/stenting and L-->R fem-fem bypass w/PTFE by Dr. Peterson 7/24/2024  -on ASA, Brilinta and statin  -Management per vascular surgery    Non-anion gap metabolic acidosis:  -Improving  -Serum bicarb 18, AG 9 today  -Continue sodium bicarbonate tablets 650 mg twice daily.  Resumed this  a.m.  -Continue to monitor    Hypertension/Volume status:  -BP hypertensive.  no significant intraoperative or postoperative hypotension  -s/p 3 units pRBC intra-op and 1.5L bolus LR since OR  -volume status hypervolemia  -Home medications: Amlodipine 5 mg daily, labetalol 300 mg every 12 hours, losartan 100 mg daily, clonidine 0.3 mg patch weekly  -Current medications: Amlodipine 5 mg daily, Toprol- mg daily, clonidine 0.3 mg patch weekly  -consider dose of IV lasix  -Continue to hold losartan today  -Optimize hemodynamic status to avoid delay in renal recovery  -recommend hold parameters on antihypertensive's for SBP <130 mmHg.  -Avoid hypotension or fluctuations in blood pressure.  Maintain MAP >65  -low sodium (2 gm) diet  -continue to trend    ABLA on Anemia of CKD:  -s/p 3 unit pRBC intra-op on 7/24/2034  -Hgb 9.4, below goal but stable.  Goal >10  -Avoiding IV Venofer in the setting of infection  -No DIO due to history of CVA and bladder cancer  -continue to trend  -Transfuse for Hgb <7.0  -management per primary team    Bone Mineral Disease of CKD:  -Calcium stable  -Secondary hyperparathyroidism of renal origin.  Sensipar 30 mg every other day on hold.  Continue to monitor  -continue to monitor and follow phosphorus, PTH, calcium, magnesium as outpatient    DM II:  -stable  -HgbA1C 5.9  -continue to optimize glycemic control to slow progression of chronic kidney disease  -management per primary team    Plan Summary:  -Renal function stable despite slight bump in creatinine  -Continue to hold losartan  -Consider intermittent IV Lasix as needed  -Vent wean per primary team  -Trend BMP in a.m.    SUBJECTIVE:  Patient remains intubated on vent.  Weaning vent this a.m., currently on pressure support and tolerating.  S/p 3 units PRBC IntraOp.  Borderline urine output has received LR 1.5L this a.m.  Creatinine with slight bump to 1.59.  VSS.  Drips: Diprovan, fentanyl, LR @ 125 ml/hr    A complete review of  systems was unable to be performed due to patient intubated, sedated on vent..     OBJECTIVE:  Current Weight: Weight - Scale: 73.3 kg (161 lb 9.6 oz)  Vitals:    07/25/24 0700 07/25/24 0715 07/25/24 0730 07/25/24 0750   BP: 135/62  150/66    BP Location: Left arm      Pulse: 78 81 79    Resp: 14 14 16    Temp:  97.6 °F (36.4 °C)     TempSrc:  Oral     SpO2: 100% 100% 100% 100%   Weight:       Height:           Intake/Output Summary (Last 24 hours) at 7/25/2024 0758  Last data filed at 7/25/2024 0730  Gross per 24 hour   Intake 7697.98 ml   Output 2285 ml   Net 5412.98 ml       General: Intubated, sedated on vent.  Awake, alert, appears comfortable and in no acute distress.  Skin:  No rash, warm, good skin turgor   Eyes:  PERRL, EOMI, sclerae nonicteric.  no periorbital edema   ENT:  Moist mucous membranes.  + ETT in place  Neck:  Trachea midline, symmetric.  No JVD.  Chest:  Clear to auscultation bilaterally without wheezes, crackles or rhonchi  CVS:  Regular rate and rhythm without murmur, gallop or rub.  S1 and S2 identified and normal.  No S3, S4.   Abdomen:  Soft, nontender, nondistended without masses.  Normal bowel sounds x 4 quadrants.  No bruit.  Extremities:  Warm, pink, motor and sensory intact and well perfused.  No cyanosis, pallor.  Right foot dressing in place.  1+ bilateral lower leg edema.  Bilateral groin wound vacs in place.  Neuro: Intubated, on vent.  Awake, alert, responding to questions.  Grossly intact  Psych: Intubated, sedated on vent.  Responding to questions  : Mclaughlin catheter in place draining dilute urine      Medications:    Current Facility-Administered Medications:     acetaminophen (TYLENOL) tablet 975 mg, 975 mg, Oral, Q8H On license of [facility], Harvey Rey DO, 975 mg at 07/25/24 0550    allopurinol (ZYLOPRIM) tablet 100 mg, 100 mg, Oral, Daily, Harvey Rey DO, 100 mg at 07/23/24 0825    amLODIPine (NORVASC) tablet 5 mg, 5 mg, Oral, Daily, Harvey Rey DO, 5 mg at 07/23/24 6233    aspirin  chewable tablet 81 mg, 81 mg, Oral, Daily, Harvey Rey DO, 81 mg at 07/23/24 0825    ceFAZolin (ANCEF) IVPB (premix in dextrose) 1,000 mg 50 mL, 1,000 mg, Intravenous, Q12H, Harvey Rey DO, Last Rate: 100 mL/hr at 07/24/24 2130, 1,000 mg at 07/24/24 2130    chlorhexidine (PERIDEX) 0.12 % oral rinse 15 mL, 15 mL, Mouth/Throat, Q12H JAVIER, Hannah Baxter PA-C, 15 mL at 07/24/24 2129    cloNIDine (CATAPRES-TTS-3) 0.3 mg/24 hr TD weekly patch, 1 patch, Transdermal, Weekly, Harvey Rey DO, 0.3 mg at 07/23/24 0825    escitalopram (LEXAPRO) tablet 20 mg, 20 mg, Oral, Daily, Harvey Rey DO, 20 mg at 07/23/24 0825    fentaNYL (SUBLIMAZE) injection 50 mcg, 50 mcg, Intravenous, Q5 Min PRN, Mp Nielsen DO    fentaNYL 1000 mcg in sodium chloride 0.9% 100mL infusion, 25 mcg/hr, Intravenous, Continuous, Hannah Baxter PA-C, Last Rate: 2.5 mL/hr at 07/24/24 1911, 25 mcg/hr at 07/24/24 1911    heparin (porcine) subcutaneous injection 5,000 Units, 5,000 Units, Subcutaneous, Q8H [facility], 5,000 Units at 07/25/24 0550 **AND** [CANCELED] Platelet count, , , Once, Harvey Rey DO    HYDROmorphone (DILAUDID) injection 0.5 mg, 0.5 mg, Intravenous, Q5 Min PRN, Mp Nielsen DO    HYDROmorphone HCl (DILAUDID) injection 0.2 mg, 0.2 mg, Intravenous, Q6H PRN, Harvey Rey DO    insulin lispro (HumALOG/ADMELOG) 100 units/mL subcutaneous injection 1-5 Units, 1-5 Units, Subcutaneous, TID AC, 1 Units at 07/23/24 2572 **AND** Fingerstick Glucose (POCT), , , TID AC, Harvey Rey DO    lactated ringers bolus 1,000 mL, 1,000 mL, Intravenous, Once, GRACIELA Lenz, Last Rate: 1,000 mL/hr at 07/25/24 0706, 1,000 mL at 07/25/24 0706    lactated ringers bolus 500 mL, 500 mL, Intravenous, Once PRN **AND** lactated ringers bolus 500 mL, 500 mL, Intravenous, Once PRN, Harvey Rey DO    lactated ringers infusion, 150 mL/hr, Intravenous, Continuous, Hannah Baxter PA-C, Last Rate: 150 mL/hr at 07/25/24 0118, 150 mL/hr at  07/25/24 0118    lidocaine (LIDODERM) 5 % patch 1 patch, 1 patch, Topical, Daily, Harvey Rey DO, 1 patch at 07/23/24 0825    LORazepam (ATIVAN) tablet 0.5 mg, 0.5 mg, Oral, Q8H PRN, Harvey Rey DO, 0.5 mg at 07/23/24 1307    metoprolol succinate (TOPROL-XL) 24 hr tablet 100 mg, 100 mg, Oral, Daily, Harvey Rey DO, 100 mg at 07/23/24 0825    ondansetron (ZOFRAN) injection 4 mg, 4 mg, Intravenous, Q6H PRN, Harvey Rey DO    ondansetron (ZOFRAN) injection 4 mg, 4 mg, Intravenous, Once PRN, Mp Nielsen DO    oxyCODONE (ROXICODONE) IR tablet 5 mg, 5 mg, Oral, Q6H PRN, Harvey Rey DO, 5 mg at 07/24/24 0503    pravastatin (PRAVACHOL) tablet 80 mg, 80 mg, Oral, Daily With Dinner, Harvey Rey DO, 80 mg at 07/23/24 1732    propofol (DIPRIVAN) 1000 mg in 100 mL infusion (premix), 5-50 mcg/kg/min, Intravenous, Titrated, Hannah Baxter PA-C, Last Rate: 7.8 mL/hr at 07/25/24 0603, 20 mcg/kg/min at 07/25/24 0603    sodium bicarbonate tablet 650 mg, 650 mg, Oral, BID after meals, Harvey Rey DO, 650 mg at 07/25/24 0730    sodium chloride 0.9 % bolus 500 mL, 500 mL, Intravenous, Once PRN **AND** sodium chloride 0.9 % bolus 500 mL, 500 mL, Intravenous, Once PRN, Harvey Rey DO    ticagrelor (BRILINTA) tablet 90 mg, 90 mg, Oral, Q12H JAVIER, Harvey Rey DO, 90 mg at 07/24/24 2128    traZODone (DESYREL) tablet 50 mg, 50 mg, Oral, HS, Harvey Rey DO, 50 mg at 07/23/24 2133    Laboratory Results:  Results from last 7 days   Lab Units 07/25/24  0436 07/24/24  1939 07/24/24  1551 07/24/24  1341 07/24/24  1037 07/24/24  0948 07/24/24  0901 07/24/24  0456 07/23/24  0444 07/22/24  0541 07/20/24  0551 07/19/24  0518   WBC Thousand/uL 25.83* 32.36*  --   --   --   --   --  13.51* 12.47* 13.02* 11.18* 13.35*   HEMOGLOBIN g/dL 9.4* 11.0*  --   --   --   --   --  10.2* 9.9* 10.6* 9.9* 10.8*   I STAT HEMOGLOBIN g/dl  --   --  7.8* 8.2* 7.8* 7.5* 7.1*  --   --   --   --   --    HEMATOCRIT % 28.1* 33.1*  --   --   --   --    --  31.3* 30.3* 32.2* 30.4* 33.5*   HEMATOCRIT, ISTAT %  --   --  23* 24* 23* 22* 21*  --   --   --   --   --    PLATELETS Thousands/uL 310 293  --   --   --   --   --  357 315 351 343 357   SODIUM mmol/L 139 141  --   --   --   --   --  139 139 138 139 140   POTASSIUM mmol/L 4.5 5.3  --   --   --   --   --  3.6 4.1 4.1 3.9 3.8   CHLORIDE mmol/L 112* 114*  --   --   --   --   --  104 106 106 108 108   CO2 mmol/L 18* 17*  --   --   --   --   --  23 24 24 19* 20*   CO2, I-STAT mmol/L  --   --  18* 20* 20* 21 23  --   --   --   --   --    BUN mg/dL 30* 25  --   --   --   --   --  24 22 21 19 20   CREATININE mg/dL 1.59* 1.41*  --   --   --   --   --  1.46* 1.43* 1.49* 1.49* 1.53*   CALCIUM mg/dL 8.2* 8.8  --   --   --   --   --  8.8 8.4 8.3* 8.0* 8.1*   MAGNESIUM mg/dL  --  1.6*  --   --   --   --   --   --   --   --   --   --    PHOSPHORUS mg/dL  --  6.4*  --   --   --   --   --   --   --   --   --   --    GLUCOSE, ISTAT mg/dl  --   --  179* 186* 183* 163* 138  --   --   --   --   --        I have personally reviewed the blood work as stated above and in my note.  I have personally reviewed internal Medicine, co-consultants and previous nephrology notes.

## (undated) DEVICE — PINNACLE R/O II INTRODUCER SHEATH WITH RADIOPAQUE MARKER: Brand: PINNACLE

## (undated) DEVICE — DECANTER: Brand: UNBRANDED

## (undated) DEVICE — SURGICEL 4 X 8IN

## (undated) DEVICE — SURGICEL FIBRILLAR 1 X 2

## (undated) DEVICE — PENCIL ELECTROSURG E-Z CLEAN -0035H

## (undated) DEVICE — SUT VICRYL 2-0 SH 27 IN UNDYED J417H

## (undated) DEVICE — BLADE SAGITTAL 25.6 X 9.5MM

## (undated) DEVICE — INSTRUMENT POUCH: Brand: CONVERTORS

## (undated) DEVICE — RADIFOCUS GLIDEWIRE ADVANTAGE GUIDEWIRE: Brand: GLIDEWIRE ADVANTAGE

## (undated) DEVICE — FLUID MANAGEMENT KIT - IR

## (undated) DEVICE — NEEDLE 18 G X 1 1/2

## (undated) DEVICE — OCCLUSIVE GAUZE STRIP,3% BISMUTH TRIBROMOPHENATE IN PETROLATUM BLEND: Brand: XEROFORM

## (undated) DEVICE — CHLORHEXIDINE 4PCT 4 OZ

## (undated) DEVICE — GLOVE INDICATOR PI UNDERGLOVE SZ 8 BLUE

## (undated) DEVICE — CAST PADDING 4 IN SYNTHETIC NON-STRL

## (undated) DEVICE — DRAPE SURGIKIT SADDLE BAG

## (undated) DEVICE — 40529 DERMAPROX PAD 11'' X 15'' X 1'': Brand: 40529 DERMAPROX PAD 11'' X 15'' X 1''

## (undated) DEVICE — SUT PROLENE 7-0 BV 175-6 24 IN 8735H

## (undated) DEVICE — STERILE ICS CARDIOVASCULAR PK: Brand: CARDINAL HEALTH

## (undated) DEVICE — SUT MONOCRYL 4-0 PS-2 18 IN Y496G

## (undated) DEVICE — SYRINGE 10ML LL

## (undated) DEVICE — SUT PROLENE 3-0 PS1 18 IN 8663G

## (undated) DEVICE — BETHLEHEM UNIVERSAL  MIONR EXT: Brand: CARDINAL HEALTH

## (undated) DEVICE — THYROID SHEET: Brand: CONVERTORS

## (undated) DEVICE — ASTOUND STANDARD SURGICAL GOWN, XL: Brand: CONVERTORS

## (undated) DEVICE — GLOVE SRG BIOGEL ECLIPSE 7.5

## (undated) DEVICE — 2000CC GUARDIAN II: Brand: GUARDIAN

## (undated) DEVICE — MICROPUNCTURE 501

## (undated) DEVICE — SUT SILK 2-0 30 IN A305H

## (undated) DEVICE — 3M™ IOBAN™ 2 ANTIMICROBIAL INCISE DRAPE 6640EZ: Brand: IOBAN™ 2

## (undated) DEVICE — CATH BAL CHARGER 6 X 80MM X 75CM

## (undated) DEVICE — PLUMEPEN PRO 10FT

## (undated) DEVICE — RADIFOCUS GLIDECATH: Brand: GLIDECATH

## (undated) DEVICE — SPECIMEN CONTAINER STERILE PEEL PACK

## (undated) DEVICE — TUBING SUCTION 5MM X 12 FT

## (undated) DEVICE — SCD SEQUENTIAL COMPRESSION COMFORT SLEEVE MEDIUM KNEE LENGTH: Brand: KENDALL SCD

## (undated) DEVICE — SUT PROLENE 5-0 BV-1 24 IN 9702H

## (undated) DEVICE — SNAP KOVER: Brand: UNBRANDED

## (undated) DEVICE — ADHESIVE SKIN HIGH VISCOSITY EXOFIN 1ML

## (undated) DEVICE — ANGLED-TIP ARTERIAL SHEATH CONFIGURATION: Brand: ENROUTE TRANSCAROTID NEUROPROTECTION SYSTEM

## (undated) DEVICE — GLOVE SRG BIOGEL 7.5

## (undated) DEVICE — CATH BAL STERLING OTW 4 X 100MM X 135CM

## (undated) DEVICE — STOCKINETTE REGULAR

## (undated) DEVICE — CHLORAPREP HI-LITE 26ML ORANGE

## (undated) DEVICE — PADDING CAST 4 IN  COTTON STRL

## (undated) DEVICE — STERILE BETHLEHEM FEM POP PACK: Brand: CARDINAL HEALTH

## (undated) DEVICE — ABDOMINAL PAD: Brand: DERMACEA

## (undated) DEVICE — DRAPE EQUIPMENT RF WAND

## (undated) DEVICE — GLOVE INDICATOR PI UNDERGLOVE SZ 7.5 BLUE

## (undated) DEVICE — HI-TORQUE COMMAND 18 ST GUIDE WIRE .018" 10 CM / 4 G, 300 CM: Brand: HI-TORQUE COMMAND

## (undated) DEVICE — IV CATH 16 G SAFETY

## (undated) DEVICE — PACK TUR

## (undated) DEVICE — SUT SILK 3-0 30 IN A304H

## (undated) DEVICE — ACE WRAP 4 IN UNSTERILE

## (undated) DEVICE — RADIFOCUS GLIDEWIRE: Brand: GLIDEWIRE

## (undated) DEVICE — PETRI DISH STERILE

## (undated) DEVICE — UROCATCH BAG

## (undated) DEVICE — LIGACLIP MCA MULTIPLE CLIP APPLIERS, 20 SMALL CLIPS: Brand: LIGACLIP

## (undated) DEVICE — HF-RESECTION ELECTRODE PLASMALOOP LOOP, LARGE, 24 FR., 12°/16°, ESG TURIS: Brand: OLYMPUS

## (undated) DEVICE — PROBE COVER: Brand: STERILE PROBE COVER

## (undated) DEVICE — SUT PROLENE 6-0 BV130 30 IN 8709H

## (undated) DEVICE — NEEDLE 25G X 1 1/2

## (undated) DEVICE — GUIDEWIRE STRGHT TIP 0.035 IN  SOLO PLUS

## (undated) DEVICE — PROXIMATE SKIN STAPLERS (35 WIDE) CONTAINS 35 STAINLESS STEEL STAPLES (FIXED HEAD): Brand: PROXIMATE

## (undated) DEVICE — FOGARTY - HYDRAGRIP SURGICAL - CLAMP INSERTS: Brand: FOGARTY SOFTJAW

## (undated) DEVICE — SUT PROLENE 5-0 C-1/C-1 36 IN 8720ZH

## (undated) DEVICE — SPONGE LAP 18 X 18 IN STRL RFD

## (undated) DEVICE — VESSEL LOOPS,MINI, BLUE: Brand: DEVON

## (undated) DEVICE — COBAN 4 IN STERILE

## (undated) DEVICE — WET SKIN PREP TRAY: Brand: MEDLINE INDUSTRIES, INC.

## (undated) DEVICE — Device

## (undated) DEVICE — VESSEL LOOP MAXI - RED

## (undated) DEVICE — 3M™ IOBAN™ 2 ANTIMICROBIAL INCISE DRAPE 6648EZ: Brand: IOBAN™ 2

## (undated) DEVICE — PRESTO™ INFLATION DEVICE: Brand: PRESTO

## (undated) DEVICE — SUT POLYESTER TAPE D-G 8618-00

## (undated) DEVICE — PREMIUM DRY TRAY LF: Brand: MEDLINE INDUSTRIES, INC.

## (undated) DEVICE — INVIEW CLEAR LEGGINGS: Brand: CONVERTORS

## (undated) DEVICE — PACK UNIVERSAL DRAPES SUB-Q ICD

## (undated) DEVICE — SUT MONOCRYL 2-0 CT-1 36 IN Y945H

## (undated) DEVICE — CATH DIAG 5FR .035 65CM 6S OMMI-FLUSH

## (undated) DEVICE — SUT SILK 0 CT-1 30 IN 424H

## (undated) DEVICE — AVIATOR PLUS .014 4.0X30 142CM: Brand: AVIATOR

## (undated) DEVICE — BEACON TIP TORCON NB ADVANTAGE CATHETER: Brand: BEACON TIP TORCON NB

## (undated) DEVICE — SUT MONOCRYL 3-0 SH 27 IN Y416H

## (undated) DEVICE — TRAY FOLEY 16FR URIMETER SURESTEP

## (undated) DEVICE — KERLIX BANDAGE ROLL: Brand: KERLIX

## (undated) DEVICE — SUT SILK 4-0 30 IN A303H

## (undated) DEVICE — 10FR FRAZIER SUCTION HANDLE: Brand: CARDINAL HEALTH

## (undated) DEVICE — CATH URETERAL 5FR X 70 CM FLEX TIP POLYUR BARD

## (undated) DEVICE — LIGACLIP MCA MULTIPLE CLIP APPLIERS, 20 MEDIUM CLIPS: Brand: LIGACLIP

## (undated) DEVICE — DISPOSABLE OR TOWEL: Brand: CARDINAL HEALTH

## (undated) DEVICE — SURGIFOAM 8.5 X 12.5

## (undated) DEVICE — INTENDED FOR TISSUE SEPARATION, AND OTHER PROCEDURES THAT REQUIRE A SHARP SURGICAL BLADE TO PUNCTURE OR CUT.: Brand: BARD-PARKER ® CARBON RIB-BACK BLADES

## (undated) DEVICE — PREP PAD BNS: Brand: CONVERTORS

## (undated) DEVICE — PAD GROUNDING ADULT

## (undated) DEVICE — 450 ML BOTTLE OF 0.05% CHLORHEXIDINE GLUCONATE IN 99.95% STERILE WATER FOR IRRIGATION, USP AND APPLICATOR.: Brand: IRRISEPT ANTIMICROBIAL WOUND LAVAGE

## (undated) DEVICE — STERILE SURGICAL LUBRICANT,  TUBE: Brand: SURGILUBE

## (undated) DEVICE — RADIFOCUS TORQUE DEVICE MULTI-TORQUE VISE: Brand: RADIFOCUS TORQUE DEVICE

## (undated) DEVICE — GUIDEWIRE AMPLATZ .035 180CM 6CM ST SS

## (undated) DEVICE — INTRODUCER KIT MICO 4FR 21G X 7CM

## (undated) DEVICE — VESSEL LOOPS X-RAY DETECTABLE: Brand: DEROYAL

## (undated) DEVICE — ULTRASOUND GEL STERILE FOIL PK

## (undated) DEVICE — BETHL CAROTID ENDARTERECTOMY: Brand: CARDINAL HEALTH

## (undated) DEVICE — SET EXT 20IN IV LS SLIDE CLAMP LF NON DEHP

## (undated) DEVICE — NON-DEHP HIGH FLOW RATE EXTENSION SET, MALE LUER LOCK ADAPTER

## (undated) DEVICE — X-RAY DETECTABLE SPONGES,16 PLY: Brand: VISTEC

## (undated) DEVICE — GUIDEWIRE AMPLATZ .035 260CM 6CM ST SS

## (undated) DEVICE — SPONGE SCRUB 4 PCT CHLORHEXIDINE

## (undated) DEVICE — CATH FOLEY 18FR 5ML 2 WAY SILICONE ELASTIMER

## (undated) DEVICE — GUIDEWIRE THRUWAY 0.014 IN 130CM LONG STR

## (undated) RX ORDER — PREDNISOLONE ACETATE 10 MG/ML
1 SUSPENSION/ DROPS OPHTHALMIC
Start: 2024-02-09

## (undated) RX ORDER — BROMFENAC SODIUM 0.7 MG/ML: 1 SOLUTION/ DROPS OPHTHALMIC ONCE A DAY